# Patient Record
Sex: MALE | Race: WHITE | Employment: OTHER | URBAN - METROPOLITAN AREA
[De-identification: names, ages, dates, MRNs, and addresses within clinical notes are randomized per-mention and may not be internally consistent; named-entity substitution may affect disease eponyms.]

---

## 2017-02-13 ENCOUNTER — OFFICE VISIT (OUTPATIENT)
Dept: AUDIOLOGY | Facility: CLINIC | Age: 57
End: 2017-02-13
Payer: COMMERCIAL

## 2017-02-13 ENCOUNTER — OFFICE VISIT (OUTPATIENT)
Dept: OTOLARYNGOLOGY | Facility: CLINIC | Age: 57
End: 2017-02-13
Payer: COMMERCIAL

## 2017-02-13 VITALS — TEMPERATURE: 98.4 F | SYSTOLIC BLOOD PRESSURE: 116 MMHG | WEIGHT: 175 LBS | DIASTOLIC BLOOD PRESSURE: 74 MMHG

## 2017-02-13 DIAGNOSIS — H90.3 ASYMMETRICAL SENSORINEURAL HEARING LOSS: Primary | ICD-10-CM

## 2017-02-13 DIAGNOSIS — H93.13 TINNITUS, BILATERAL: ICD-10-CM

## 2017-02-13 DIAGNOSIS — H90.3 SENSORINEURAL HEARING LOSS, ASYMMETRICAL: Primary | ICD-10-CM

## 2017-02-13 PROCEDURE — 92550 TYMPANOMETRY & REFLEX THRESH: CPT | Performed by: AUDIOLOGIST

## 2017-02-13 PROCEDURE — 92557 COMPREHENSIVE HEARING TEST: CPT | Performed by: AUDIOLOGIST

## 2017-02-13 PROCEDURE — 92593 HC HEARING AID CHECK, BINAURAL: CPT | Performed by: AUDIOLOGIST

## 2017-02-13 PROCEDURE — 99207 ZZC NO CHARGE LOS: CPT | Performed by: AUDIOLOGIST

## 2017-02-13 PROCEDURE — 99203 OFFICE O/P NEW LOW 30 MIN: CPT | Performed by: OTOLARYNGOLOGY

## 2017-02-13 PROCEDURE — V5266 BATTERY FOR HEARING DEVICE: HCPCS | Performed by: AUDIOLOGIST

## 2017-02-13 ASSESSMENT — PAIN SCALES - GENERAL: PAINLEVEL: NO PAIN (0)
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
## 2017-02-13 NOTE — NURSING NOTE
Initial /74 (BP Location: Left arm, Patient Position: Chair, Cuff Size: Adult Regular)  Temp 98.4  F (36.9  C) (Oral)  Wt 79.4 kg (175 lb) There is no height or weight on file to calculate BMI. .    Dina Aguirre LPN

## 2017-02-13 NOTE — MR AVS SNAPSHOT
"              After Visit Summary   2/13/2017    Abhijeet Mccauley    MRN: [id number]           Patient Information     Date Of Birth          1960        Visit Information        Provider Department      2/13/2017 1:30 PM Ai Min AuD [facility]        Today's Diagnoses     Sensorineural hearing loss, asymmetrical    -  1       Follow-ups after your visit        Your next 10 appointments already scheduled     Mar 20, 2017  1:00 PM CDT   [facility] with Sheila Romano   [facility]    [address] [address]   [phone]              Who to contact     If you have questions or need follow up information about today's clinic visit or your schedule please contact [facility] directly at [phone].  Normal or non-critical lab and imaging results will be communicated to you by [email or web address], letter or phone within 4 business days after the clinic has received the results. If you do not hear from us within 7 days, please contact the clinic through MyChart or phone. If you have a critical or abnormal lab result, we will notify you by phone as soon as possible.  Submit refill requests through [employer] or call your pharmacy and they will forward the refill request to us. Please allow 3 business days for your refill to be completed.          Additional Information About Your Visit        MyChart Information     [employer] lets you send messages to your doctor, view your test results, renew your prescriptions, schedule appointments and more. To sign up, go to [email or web address] . Click on \"Log in\" on the left side of the screen, which will take you to the Welcome page. Then click on \"Sign up Now\" on the right side of the page.     You will be asked to enter the access code listed below, as well as some personal information. Please follow the directions to create your username and password.     Your access code is: " [email or web address]  Expires: 3/21/2017  2:50 PM     Your access code will  in 90 days. If you need help or a new code, please call your [place] clinic or [phone].        Care EveryWhere ID     This is your Care EveryWhere ID. This could be used by other organizations to access your [place] medical records  [id number]         Blood Pressure from Last 3 Encounters:   17 116/74   16 117/53   12/10/14 145/88    Weight from Last 3 Encounters:   17 175 lb (79.4 kg)   12 183 lb (83 kg)              We Performed the Following     HEARING AID CHECK, BINAURAL     HEARING DEVICE BATTERY        Primary Care Provider    [facility]                Thank you!     Thank you for choosing [facility]  for your care. Our goal is always to provide you with excellent care. Hearing back from our patients is one way we can continue to improve our services. Please take a few minutes to complete the written survey that you may receive in the mail after your visit with us. Thank you!             Your Updated Medication List - Protect others around you: Learn how to safely use, store and throw away your medicines at www.disposemymeds.org.          This list is accurate as of: 17  4:45 PM.  Always use your most recent med list.                   Brand Name Dispense Instructions for use    acetaminophen 500 MG tablet    TYLENOL     Take 1-2 tablets by mouth every 6 hours as needed.       albuterol 90 MCG/ACT inhaler     1 Inhaler    Inhale 2 puffs into the lungs every 4 hours as needed for shortness of breath / dyspnea.       AMITRIPTYLINE HCL PO      Take 25 mg by mouth       amLODIPine 5 MG tablet    NORVASC    90 tablet    Take 1 tablet by mouth daily.       aspirin 325 MG tablet     100 tablet    Take 1 tablet by mouth daily. Further refills from primary care physician       atenolol 50 MG tablet    TENORMIN    90 tablet    Take 1 tablet by mouth daily.       FIRST-MOUTHWASH BLM Susp      237 mL    Swish and swallow 5-10 mLs in mouth every 6 hours as needed       FLEXERIL PO          hydrocortisone 1 % cream    CORTAID    30 g    Apply topically 2 times daily Apply sparingly to face rash twice daily.       LASIX PO      Take 20 mg by mouth       loratadine 10 MG tablet    CLARITIN    30 tablet    Take 1 tablet by mouth daily as needed for allergies.       naproxen sodium 220 MG tablet    ANAPROX     Take 220 mg by mouth 2 times daily (with meals).       PANTOPRAZOLE SODIUM PO      Take 40 mg by mouth       potassium chloride 20 MEQ Packet    KLOR-CON     Take 10 mEq by mouth 2 times daily       PROPRANOLOL HCL PO      Take 20 mg by mouth       SODIUM BICARBONATE PO      Take 650 mg by mouth       traMADol 50 MG tablet    ULTRAM     Take 50 mg by mouth every 6 hours as needed.       TRAZODONE HCL PO      Take 50 mg by mouth       valACYclovir 1000 mg tablet    VALTREX    21 tablet    Take 1 tablet (1,000 mg) by mouth 3 times daily       zolpidem 5 MG tablet    AMBIEN     Take 5 mg by mouth nightly as needed.

## 2017-02-13 NOTE — MR AVS SNAPSHOT
"              After Visit Summary   2/13/2017    Abhijeet Mccauley    MRN: [id number]           Patient Information     Date Of Birth          1960        Visit Information        Provider Department      2/13/2017 12:30 PM Kaylen Rodríguez [facility]D [facility]        Today's Diagnoses     Sensorineural hearing loss, asymmetrical    -  1    Tinnitus, bilateral           Follow-ups after your visit        Your next 10 appointments already scheduled     Feb 13, 2017  1:30 PM CST   New Visit with Sheila Romano   [facility]    [address] [address]   [phone]              Who to contact     If you have questions or need follow up information about today's clinic visit or your schedule please contact [facility] directly at [phone].  Normal or non-critical lab and imaging results will be communicated to you by MyChart, letter or phone within 4 business days after the clinic has received the results. If you do not hear from us within 7 days, please contact the clinic through MyChart or phone. If you have a critical or abnormal lab result, we will notify you by phone as soon as possible.  Submit refill requests through [email or web address] or call your pharmacy and they will forward the refill request to us. Please allow 3 business days for your refill to be completed.          Additional Information About Your Visit        MyChart Information     [email or web address] lets you send messages to your doctor, view your test results, renew your prescriptions, schedule appointments and more. To sign up, go to [email or web address] . Click on \"Log in\" on the left side of the screen, which will take you to the Welcome page. Then click on \"Sign up Now\" on the right side of the page.     You will be asked to enter the access code listed below, as well as some personal information. Please follow the directions to create your username and " password.     Your access code is: [email or web address]  Expires: 3/21/2017  2:50 PM     Your access code will  in 90 days. If you need help or a new code, please call your [facility] or [phone].        Care EveryWhere ID     This is your Care EveryWhere ID. This could be used by other organizations to access your [facility] medical records  [id number]         Blood Pressure from Last 3 Encounters:   16 117/53   12/10/14 145/88   12 155/83    Weight from Last 3 Encounters:   12 183 lb (83 kg)              We Performed the Following     AUDIOGRAM/TYMPANOGRAM - INTERFACE     COMPREHENSIVE HEARING TEST     TYMPANOMETRY AND REFLEX THRESHOLD MEASUREMENTS        Primary Care Provider    [facility]                Thank you!     Thank you for choosing [facility]  for your care. Our goal is always to provide you with excellent care. Hearing back from our patients is one way we can continue to improve our services. Please take a few minutes to complete the written survey that you may receive in the mail after your visit with us. Thank you!             Your Updated Medication List - Protect others around you: Learn how to safely use, store and throw away your medicines at www.disposemymeds.org.          This list is accurate as of: 17  1:22 PM.  Always use your most recent med list.                   Brand Name Dispense Instructions for use    acetaminophen 500 MG tablet    TYLENOL     Take 1-2 tablets by mouth every 6 hours as needed.       albuterol 108 (90 BASE) MCG/ACT Inhaler   Generic drug:  albuterol      Inhale  into the lungs.       albuterol 90 MCG/ACT inhaler     1 Inhaler    Inhale 2 puffs into the lungs every 4 hours as needed for shortness of breath / dyspnea.       AMITRIPTYLINE HCL PO      Take 25 mg by mouth       amLODIPine 5 MG tablet    NORVASC    90 tablet    Take 1 tablet by mouth daily.       aspirin 325 MG tablet     100 tablet    Take 1 tablet by mouth  daily. Further refills from primary care physician       atenolol 50 MG tablet    TENORMIN    90 tablet    Take 1 tablet by mouth daily.       FIRST-MOUTHWASH BLM Susp     237 mL    Swish and swallow 5-10 mLs in mouth every 6 hours as needed       FLEXERIL PO          hydrocortisone 1 % cream    CORTAID    30 g    Apply topically 2 times daily Apply sparingly to face rash twice daily.       LASIX PO      Take 20 mg by mouth       loratadine 10 MG tablet    CLARITIN    30 tablet    Take 1 tablet by mouth daily as needed for allergies.       naproxen sodium 220 MG tablet    ANAPROX     Take 220 mg by mouth 2 times daily (with meals).       PANTOPRAZOLE SODIUM PO      Take 40 mg by mouth       potassium chloride 20 MEQ Packet    KLOR-CON     Take 10 mEq by mouth 2 times daily       PROPRANOLOL HCL PO      Take 20 mg by mouth       SODIUM BICARBONATE PO      Take 650 mg by mouth       traMADol 50 MG tablet    ULTRAM     Take 50 mg by mouth every 6 hours as needed.       TRAZODONE HCL PO      Take 50 mg by mouth       valACYclovir 1000 mg tablet    VALTREX    21 tablet    Take 1 tablet (1,000 mg) by mouth 3 times daily       zolpidem 5 MG tablet    AMBIEN     Take 5 mg by mouth nightly as needed.

## 2017-02-13 NOTE — MR AVS SNAPSHOT
After Visit Summary   2/13/2017    Abhijeet Mccauley    MRN: [id number]           Patient Information     Date Of Birth          1960        Visit Information        Provider Department      2/13/2017 1:15 PM Lennox Polanco MD [facility]        Today's Diagnoses     Asymmetrical sensorineural hearing loss    -  1      Care Instructions    Per physician's instructions          Follow-ups after your visit        Additional Services     AUDIOLOGY ADULT REFERRAL       Your provider has referred you to: [facility]: [facility] [phone]   [email or web address]    Treatment:  Evaluation & Treatment  Specialty Testing:  Audiogram w/Tymps and Reflexes    Please be aware that coverage of these services is subject to the terms and limitations of your health insurance plan.  Call member services at your health plan with any benefit or coverage questions.      Please bring the following to your appointment:  >>   Any x-rays, CTs or MRIs which have been performed.  Contact the facility where they were done to arrange for  prior to your scheduled appointment.  Any new CT, MRI or other procedures ordered by your specialist must be performed at a [place] facility or coordinated by your clinic's referral office.   >>   List of current medications  >>   This referral request   >>   Any documents/labs given to you for this referral                  Who to contact     If you have questions or need follow up information about today's clinic visit or your schedule please contact [facility] directly at [phone].  Normal or non-critical lab and imaging results will be communicated to you by MyChart, letter or phone within 4 business days after the clinic has received the results. If you do not hear from us within 7 days, please contact the clinic through MyChart or phone. If you have a critical or abnormal lab result, we will notify you by  "phone as soon as possible.  Submit refill requests through MegaHoot or call your pharmacy and they will forward the refill request to us. Please allow 3 business days for your refill to be completed.          Additional Information About Your Visit        MegaHoot Information     MegaHoot lets you send messages to your doctor, view your test results, renew your prescriptions, schedule appointments and more. To sign up, go to [email or web address] . Click on \"Log in\" on the left side of the screen, which will take you to the Welcome page. Then click on \"Sign up Now\" on the right side of the page.     You will be asked to enter the access code listed below, as well as some personal information. Please follow the directions to create your username and password.     Your access code is: [email or web address]  Expires: 3/21/2017  2:50 PM     Your access code will  in 90 days. If you need help or a new code, please call your [place] clinic or [phone].        Care EveryWhere ID     This is your Care EveryWhere ID. This could be used by other organizations to access your [place] medical records  [id number]        Your Vitals Were     Temperature                   98.4  F (36.9  C) (Oral)            Blood Pressure from Last 3 Encounters:   17 116/74   16 117/53   12/10/14 145/88    Weight from Last 3 Encounters:   17 79.4 kg (175 lb)   12 83 kg (183 lb)              We Performed the Following     AUDIOLOGY ADULT REFERRAL        Primary Care Provider    Md Mehreen Patton                Thank you!     Thank you for choosing [facility]  for your care. Our goal is always to provide you with excellent care. Hearing back from our patients is one way we can continue to improve our services. Please take a few minutes to complete the written survey that you may receive in the mail after your visit with us. Thank you!             Your Updated Medication List - Protect others around you: Learn " how to safely use, store and throw away your medicines at www.disposemymeds.org.          This list is accurate as of: 2/13/17  2:12 PM.  Always use your most recent med list.                   Brand Name Dispense Instructions for use    acetaminophen 500 MG tablet    TYLENOL     Take 1-2 tablets by mouth every 6 hours as needed.       albuterol 90 MCG/ACT inhaler     1 Inhaler    Inhale 2 puffs into the lungs every 4 hours as needed for shortness of breath / dyspnea.       AMITRIPTYLINE HCL PO      Take 25 mg by mouth       amLODIPine 5 MG tablet    NORVASC    90 tablet    Take 1 tablet by mouth daily.       aspirin 325 MG tablet     100 tablet    Take 1 tablet by mouth daily. Further refills from primary care physician       atenolol 50 MG tablet    TENORMIN    90 tablet    Take 1 tablet by mouth daily.       FIRST-MOUTHWASH BLM Susp     237 mL    Swish and swallow 5-10 mLs in mouth every 6 hours as needed       FLEXERIL PO          hydrocortisone 1 % cream    CORTAID    30 g    Apply topically 2 times daily Apply sparingly to face rash twice daily.       LASIX PO      Take 20 mg by mouth       loratadine 10 MG tablet    CLARITIN    30 tablet    Take 1 tablet by mouth daily as needed for allergies.       naproxen sodium 220 MG tablet    ANAPROX     Take 220 mg by mouth 2 times daily (with meals).       PANTOPRAZOLE SODIUM PO      Take 40 mg by mouth       potassium chloride 20 MEQ Packet    KLOR-CON     Take 10 mEq by mouth 2 times daily       PROPRANOLOL HCL PO      Take 20 mg by mouth       SODIUM BICARBONATE PO      Take 650 mg by mouth       traMADol 50 MG tablet    ULTRAM     Take 50 mg by mouth every 6 hours as needed.       TRAZODONE HCL PO      Take 50 mg by mouth       valACYclovir 1000 mg tablet    VALTREX    21 tablet    Take 1 tablet (1,000 mg) by mouth 3 times daily       zolpidem 5 MG tablet    AMBIEN     Take 5 mg by mouth nightly as needed.

## 2017-02-13 NOTE — PROGRESS NOTES
56 year old male comes in for a hearing aid recheck. He is currently wearing 2014 Phonak Centrafus[employer]o [id number] hearing aids that were purchased and fit at another facility. He reports he is not hearing well with the hearing aids. He states his hearing aid batteries are only lasting a few days.    Reviewed hearing aid care and use including using the hearing aid batteries.Verified hearing aid functionality.      Reprogrammed hearing aid and verified response using NAL-NL 2 targets. Hearing aids were previously set to 70% of target and were severely under amplifying. See chart in the hearing aid room.     Patient reports the hearing aids sound much better. Discussed acclimating to the new sounds.    Return to clinic in 4 weeks for a hearing aid recheck.    Ai SELF-HEATH, #[id number]

## 2017-02-22 ENCOUNTER — TELEPHONE (OUTPATIENT)
Dept: AUDIOLOGY | Facility: CLINIC | Age: 57
End: 2017-02-22

## 2017-02-22 NOTE — TELEPHONE ENCOUNTER
Reason for Call:  Other call back    Detailed comments: [facility] [employer] [facility] [facility] will require a PA for replacement if it is normal wear and tear on the hearing aid.      Phone Number Patient can be reached at: Other phone number:  [phone]*    Best Time: any     Can we leave a detailed message on this number? YES    Call taken on 2/22/2017 at 12:52 PM by Elena Jin

## 2017-02-23 ENCOUNTER — OFFICE VISIT (OUTPATIENT)
Dept: AUDIOLOGY | Facility: CLINIC | Age: 57
End: 2017-02-23
Payer: COMMERCIAL

## 2017-02-23 DIAGNOSIS — H90.3 SENSORINEURAL HEARING LOSS, ASYMMETRICAL: Primary | ICD-10-CM

## 2017-02-23 PROCEDURE — 92593 HC HEARING AID CHECK, BINAURAL: CPT | Performed by: AUDIOLOGIST

## 2017-02-23 PROCEDURE — 99207 ZZC NO CHARGE LOS: CPT | Performed by: AUDIOLOGIST

## 2017-02-23 NOTE — MR AVS SNAPSHOT
"              After Visit Summary   2/23/2017    Abhijeet Mccauley    MRN: [id number]           Patient Information     Date Of Birth          1960        Visit Information        Provider Department      2/23/2017 1:30 PM Ai Min AuD [facility]        Today's Diagnoses     Sensorineural hearing loss, asymmetrical    -  1       Follow-ups after your visit        Your next 10 appointments already scheduled     Mar 20, 2017  1:00 PM CDT   [facility]    [address] [address]   [phone]              Who to contact     If you have questions or need follow up information about today's clinic visit or your schedule please contact [facility] directly at [phone].  Normal or non-critical lab and imaging results will be communicated to you by Annovation BioPharmahart, letter or phone within 4 business days after the clinic has received the results. If you do not hear from us within 7 days, please contact the clinic through MyChart or phone. If you have a critical or abnormal lab result, we will notify you by phone as soon as possible.  Submit refill requests through Controlus or call your pharmacy and they will forward the refill request to us. Please allow 3 business days for your refill to be completed.          Additional Information About Your Visit        MyChart Information     Controlus lets you send messages to your doctor, view your test results, renew your prescriptions, schedule appointments and more. To sign up, go to [email or web address] . Click on \"Log in\" on the left side of the screen, which will take you to the Welcome page. Then click on \"Sign up Now\" on the right side of the page.     You will be asked to enter the access code listed below, as well as some personal information. Please follow the directions to create your username and password.     Your access code is: " [email or web address]  Expires: 3/21/2017  2:50 PM     Your access code will  in 90 days. If you need help or a new code, please call your [place] clinic or [phone].        Care EveryWhere ID     This is your Care EveryWhere ID. This could be used by other organizations to access your [place] medical records  [id number]         Blood Pressure from Last 3 Encounters:   17 116/74   16 117/53   12/10/14 145/88    Weight from Last 3 Encounters:   17 175 lb (79.4 kg)   12 183 lb (83 kg)              We Performed the Following     HEARING AID CHECK, BINAURAL        Primary Care Provider    [facility] Other Clinic                Thank you!     Thank you for choosing [facility]  for your care. Our goal is always to provide you with excellent care. Hearing back from our patients is one way we can continue to improve our services. Please take a few minutes to complete the written survey that you may receive in the mail after your visit with us. Thank you!             Your Updated Medication List - Protect others around you: Learn how to safely use, store and throw away your medicines at www.disposemymeds.org.          This list is accurate as of: 17  1:43 PM.  Always use your most recent med list.                   Brand Name Dispense Instructions for use    acetaminophen 500 MG tablet    TYLENOL     Take 1-2 tablets by mouth every 6 hours as needed.       albuterol 90 MCG/ACT inhaler     1 Inhaler    Inhale 2 puffs into the lungs every 4 hours as needed for shortness of breath / dyspnea.       AMITRIPTYLINE HCL PO      Take 25 mg by mouth       amLODIPine 5 MG tablet    NORVASC    90 tablet    Take 1 tablet by mouth daily.       aspirin 325 MG tablet     100 tablet    Take 1 tablet by mouth daily. Further refills from primary care physician       atenolol 50 MG tablet    TENORMIN    90 tablet    Take 1 tablet by mouth daily.       FIRST-MOUTHWASH BLM Susp     237 mL    Swish and  swallow 5-10 mLs in mouth every 6 hours as needed       FLEXERIL PO          hydrocortisone 1 % cream    CORTAID    30 g    Apply topically 2 times daily Apply sparingly to face rash twice daily.       LASIX PO      Take 20 mg by mouth       loratadine 10 MG tablet    CLARITIN    30 tablet    Take 1 tablet by mouth daily as needed for allergies.       naproxen sodium 220 MG tablet    ANAPROX     Take 220 mg by mouth 2 times daily (with meals).       PANTOPRAZOLE SODIUM PO      Take 40 mg by mouth       potassium chloride 20 MEQ Packet    KLOR-CON     Take 10 mEq by mouth 2 times daily       PROPRANOLOL HCL PO      Take 20 mg by mouth       SODIUM BICARBONATE PO      Take 650 mg by mouth       traMADol 50 MG tablet    ULTRAM     Take 50 mg by mouth every 6 hours as needed.       TRAZODONE HCL PO      Take 50 mg by mouth       valACYclovir 1000 mg tablet    VALTREX    21 tablet    Take 1 tablet (1,000 mg) by mouth 3 times daily       zolpidem 5 MG tablet    AMBIEN     Take 5 mg by mouth nightly as needed.

## 2017-02-23 NOTE — PROGRESS NOTES
56 year old male comes in with his 2014 Phonak Audeo  hearing aids. His hearing aids were ordered and fit at another facility.    Patient reports the hearing aids were working very well after they were reprogrammed 2 weeks ago but then the left one stopped working.     Examination reveals the wax guard is plugged. Demonstrated how to replaced the wax guards and clean the domes. Verified hearing aid functionality.  See chart in the hearing aid room.     Return to clinic as needed.    Ai KUNZ, #[id number]

## 2017-03-28 ENCOUNTER — OFFICE VISIT (OUTPATIENT)
Dept: AUDIOLOGY | Facility: CLINIC | Age: 57
End: 2017-03-28
Payer: COMMERCIAL

## 2017-03-28 DIAGNOSIS — H90.3 SENSORINEURAL HEARING LOSS, ASYMMETRICAL: Primary | ICD-10-CM

## 2017-03-28 PROCEDURE — 92592 HC HEARING AID CHECK, MONAURAL: CPT | Performed by: AUDIOLOGIST

## 2017-03-28 PROCEDURE — V5267 HEARING AID SUP/ACCESS/DEV: HCPCS | Performed by: AUDIOLOGIST

## 2017-03-28 NOTE — MR AVS SNAPSHOT
"              After Visit Summary   3/28/2017    Abhijeet Mccauley    MRN: [id number]           Patient Information     Date Of Birth          1960        Visit Information        Provider Department      3/28/2017 2:00 PM Ai Min AuD [facility]        Today's Diagnoses     Sensorineural hearing loss, asymmetrical    -  1       Follow-ups after your visit        Who to contact     If you have questions or need follow up information about today's clinic visit or your schedule please contact [facility] directly at [phone].  Normal or non-critical lab and imaging results will be communicated to you by Connollyhart, letter or phone within 4 business days after the clinic has received the results. If you do not hear from us within 7 days, please contact the clinic through Clarus Systemst or phone. If you have a critical or abnormal lab result, we will notify you by phone as soon as possible.  Submit refill requests through mValent or call your pharmacy and they will forward the refill request to us. Please allow 3 business days for your refill to be completed.          Additional Information About Your Visit        MyChart Information     mValent lets you send messages to your doctor, view your test results, renew your prescriptions, schedule appointments and more. To sign up, go to [email or web address] . Click on \"Log in\" on the left side of the screen, which will take you to the Welcome page. Then click on \"Sign up Now\" on the right side of the page.     You will be asked to enter the access code listed below, as well as some personal information. Please follow the directions to create your username and password.     Your access code is: [email or web address]  Expires: 2017  2:21 PM     Your access code will  in 90 days. If you need help or a new code, please call your [facility] or [phone].        Care EveryWhere ID     This is your Care EveryWhere ID. This could be used " by other organizations to access your [place] medical records  [id number]         Blood Pressure from Last 3 Encounters:   02/13/17 116/74   12/21/16 117/53   12/10/14 145/88    Weight from Last 3 Encounters:   02/13/17 175 lb (79.4 kg)   01/26/12 183 lb (83 kg)              We Performed the Following     HEARING AID CHECK, MONAURAL     HEARING AID SUPPLY        Primary Care Provider    [facility] Other Clinic                Thank you!     Thank you for choosing [facility]  for your care. Our goal is always to provide you with excellent care. Hearing back from our patients is one way we can continue to improve our services. Please take a few minutes to complete the written survey that you may receive in the mail after your visit with us. Thank you!             Your Updated Medication List - Protect others around you: Learn how to safely use, store and throw away your medicines at www.disposemymeds.org.          This list is accurate as of: 3/28/17  2:21 PM.  Always use your most recent med list.                   Brand Name Dispense Instructions for use    acetaminophen 500 MG tablet    TYLENOL     Take 1-2 tablets by mouth every 6 hours as needed.       albuterol 90 MCG/ACT inhaler     1 Inhaler    Inhale 2 puffs into the lungs every 4 hours as needed for shortness of breath / dyspnea.       AMITRIPTYLINE HCL PO      Take 25 mg by mouth       amLODIPine 5 MG tablet    NORVASC    90 tablet    Take 1 tablet by mouth daily.       aspirin 325 MG tablet     100 tablet    Take 1 tablet by mouth daily. Further refills from primary care physician       atenolol 50 MG tablet    TENORMIN    90 tablet    Take 1 tablet by mouth daily.       FIRST-MOUTHWASH BLM Susp     237 mL    Swish and swallow 5-10 mLs in mouth every 6 hours as needed       FLEXERIL PO          hydrocortisone 1 % cream    CORTAID    30 g    Apply topically 2 times daily Apply sparingly to face rash twice daily.       LASIX PO      Take 20 mg by  mouth       loratadine 10 MG tablet    CLARITIN    30 tablet    Take 1 tablet by mouth daily as needed for allergies.       naproxen sodium 220 MG tablet    ANAPROX     Take 220 mg by mouth 2 times daily (with meals).       PANTOPRAZOLE SODIUM PO      Take 40 mg by mouth       potassium chloride 20 MEQ Packet    KLOR-CON     Take 10 mEq by mouth 2 times daily       PROPRANOLOL HCL PO      Take 20 mg by mouth       SODIUM BICARBONATE PO      Take 650 mg by mouth       traMADol 50 MG tablet    ULTRAM     Take 50 mg by mouth every 6 hours as needed.       TRAZODONE HCL PO      Take 50 mg by mouth       valACYclovir 1000 mg tablet    VALTREX    21 tablet    Take 1 tablet (1,000 mg) by mouth 3 times daily       zolpidem 5 MG tablet    AMBIEN     Take 5 mg by mouth nightly as needed.

## 2017-03-28 NOTE — PROGRESS NOTES
56 year old male comes in with his 2014 Phonak Audeo [id number] hearing aid reporting it is not working. He states he changed the battery and it still is not coming on.    Replaced the #3 xS  and large open dome. Verified hearing aid functionality.  See chart in the hearing aid room.     Patient is pleased the hearing aid is working again. Gave patient extra large open domes per his request.     Return to clinic as needed.    Ai SELF-HEATH, #[id number]

## 2017-04-12 ENCOUNTER — TELEPHONE (OUTPATIENT)
Dept: AUDIOLOGY | Facility: CLINIC | Age: 57
End: 2017-04-12

## 2017-04-12 NOTE — TELEPHONE ENCOUNTER
Reason for Call: patient is calling in states that he continues to have problems with his hearing aids    Detailed comments: please advise above    Phone Number Patient can be reached at: Home number on file [phone] (home)    Best Time: any    Can we leave a detailed message on this number? YES    Call taken on 4/12/2017 at 2:23 PM by Anna Zaragoza

## 2017-04-19 ENCOUNTER — OFFICE VISIT (OUTPATIENT)
Dept: AUDIOLOGY | Facility: CLINIC | Age: 57
End: 2017-04-19
Payer: COMMERCIAL

## 2017-04-19 DIAGNOSIS — H90.3 SENSORINEURAL HEARING LOSS, ASYMMETRICAL: Primary | ICD-10-CM

## 2017-04-19 PROCEDURE — 92592 HC HEARING AID CHECK, MONAURAL: CPT | Performed by: AUDIOLOGIST

## 2017-04-19 NOTE — PROGRESS NOTES
56 year old male comes in with his 2014 Phonak Audeo [id number] hearing aids. He reports again that they are not working.    Examination again reveals they are dirty. Reviewed and demonstrated how to change the wax guards, clean the domes and the hearing aids. Cleaned hearing aid cases for patient. Gave patient extra wax guards and cleaning tool. Verified hearing aid functionality.   See chart in the hearing aid room.     Discussed importance of keeping his hearing aids and cases clean.    Return to clinic as needed.     Ai SELF-HEATH, #[id number]

## 2017-04-19 NOTE — MR AVS SNAPSHOT
"              After Visit Summary   2017    Abhijeet Mccauley    MRN: [id number]           Patient Information     Date Of Birth          1960        Visit Information        Provider Department      2017 2:30 PM Ai Min AuD [facility]        Today's Diagnoses     Sensorineural hearing loss, asymmetrical    -  1       Follow-ups after your visit        Who to contact     If you have questions or need follow up information about today's clinic visit or your schedule please contact [facility] directly at [phone].  Normal or non-critical lab and imaging results will be communicated to you by Drikhart, letter or phone within 4 business days after the clinic has received the results. If you do not hear from us within 7 days, please contact the clinic through Hiptypet or phone. If you have a critical or abnormal lab result, we will notify you by phone as soon as possible.  Submit refill requests through Diffbot or call your pharmacy and they will forward the refill request to us. Please allow 3 business days for your refill to be completed.          Additional Information About Your Visit        MyChart Information     Diffbot lets you send messages to your doctor, view your test results, renew your prescriptions, schedule appointments and more. To sign up, go to [email or web address] . Click on \"Log in\" on the left side of the screen, which will take you to the Welcome page. Then click on \"Sign up Now\" on the right side of the page.     You will be asked to enter the access code listed below, as well as some personal information. Please follow the directions to create your username and password.     Your access code is: [email or web address]  Expires: 2017  2:21 PM     Your access code will  in 90 days. If you need help or a new code, please call your [facility] or [phone].        Care EveryWhere ID     This is your Care EveryWhere ID. This could be used " by other organizations to access your [place] medical records  [id number]         Blood Pressure from Last 3 Encounters:   02/13/17 116/74   12/21/16 117/53   12/10/14 145/88    Weight from Last 3 Encounters:   02/13/17 175 lb (79.4 kg)   01/26/12 183 lb (83 kg)              We Performed the Following     HEARING AID CHECK, MONAURAL        Primary Care Provider    [facility] Other Clinic                Thank you!     Thank you for choosing [facility]  for your care. Our goal is always to provide you with excellent care. Hearing back from our patients is one way we can continue to improve our services. Please take a few minutes to complete the written survey that you may receive in the mail after your visit with us. Thank you!             Your Updated Medication List - Protect others around you: Learn how to safely use, store and throw away your medicines at www.disposemymeds.org.          This list is accurate as of: 4/19/17  2:40 PM.  Always use your most recent med list.                   Brand Name Dispense Instructions for use    acetaminophen 500 MG tablet    TYLENOL     Take 1-2 tablets by mouth every 6 hours as needed.       albuterol 90 MCG/ACT inhaler     1 Inhaler    Inhale 2 puffs into the lungs every 4 hours as needed for shortness of breath / dyspnea.       AMITRIPTYLINE HCL PO      Take 25 mg by mouth       amLODIPine 5 MG tablet    NORVASC    90 tablet    Take 1 tablet by mouth daily.       aspirin 325 MG tablet     100 tablet    Take 1 tablet by mouth daily. Further refills from primary care physician       atenolol 50 MG tablet    TENORMIN    90 tablet    Take 1 tablet by mouth daily.       FLEXERIL PO          hydrocortisone 1 % cream    CORTAID    30 g    Apply topically 2 times daily Apply sparingly to face rash twice daily.       LASIX PO      Take 20 mg by mouth       lidocaine visc 2% & diphenhydramine 12.5mg/5mL & maalox/mylanta w/simethicone (1:1:1 v/v/v) Susp compounding kit      237 mL    Swish and swallow 5-10 mLs in mouth every 6 hours as needed       loratadine 10 MG tablet    CLARITIN    30 tablet    Take 1 tablet by mouth daily as needed for allergies.       naproxen sodium 220 MG tablet    ANAPROX     Take 220 mg by mouth 2 times daily (with meals).       PANTOPRAZOLE SODIUM PO      Take 40 mg by mouth       potassium chloride 20 MEQ Packet    KLOR-CON     Take 10 mEq by mouth 2 times daily       PROPRANOLOL HCL PO      Take 20 mg by mouth       SODIUM BICARBONATE PO      Take 650 mg by mouth       traMADol 50 MG tablet    ULTRAM     Take 50 mg by mouth every 6 hours as needed.       TRAZODONE HCL PO      Take 50 mg by mouth       valACYclovir 1000 mg tablet    VALTREX    21 tablet    Take 1 tablet (1,000 mg) by mouth 3 times daily       zolpidem 5 MG tablet    AMBIEN     Take 5 mg by mouth nightly as needed.

## 2017-05-25 ENCOUNTER — OFFICE VISIT (OUTPATIENT)
Dept: AUDIOLOGY | Facility: CLINIC | Age: 57
End: 2017-05-25
Payer: COMMERCIAL

## 2017-05-25 DIAGNOSIS — H90.3 SENSORINEURAL HEARING LOSS, ASYMMETRICAL: Primary | ICD-10-CM

## 2017-05-25 PROCEDURE — V5266 BATTERY FOR HEARING DEVICE: HCPCS | Performed by: AUDIOLOGIST

## 2017-05-25 NOTE — PROGRESS NOTES
56 year old male requests hearing aid batteries. Currently wearing 2014 [employer]  hearing aids that were fit at another clinic.     Verified date of last battery dispensing and size needed.     Billed insurance for 30 size 312 hearing aid batteries. See chart in the hearing aid room.     Return to clinic as needed.    Ai KUNZ, #[id number]

## 2017-05-25 NOTE — MR AVS SNAPSHOT
"              After Visit Summary   5/25/2017    Abhijeet Mccauley    MRN: [id number]           Patient Information     Date Of Birth          1960        Visit Information        Provider Department      5/25/2017 3:00 PM [facility]        Today's Diagnoses     Sensorineural hearing loss, asymmetrical    -  1       Follow-ups after your visit        Your next 10 appointments already scheduled     May 25, 2017  3:00 PM CDT   [facility] with Sheila [facility]   [facility]    [address] [address]   [phone]              Who to contact     If you have questions or need follow up information about today's clinic visit or your schedule please contact [facility] directly at [phone].  Normal or non-critical lab and imaging results will be communicated to you by Nimble TVhart, letter or phone within 4 business days after the clinic has received the results. If you do not hear from us within 7 days, please contact the clinic through MyChart or phone. If you have a critical or abnormal lab result, we will notify you by phone as soon as possible.  Submit refill requests through LIQVID or call your pharmacy and they will forward the refill request to us. Please allow 3 business days for your refill to be completed.          Additional Information About Your Visit        MyChart Information     LI[employer] lets you send messages to your doctor, view your test results, renew your prescriptions, schedule appointments and more. To sign up, go to [email or web address] . Click on \"Log in\" on the left side of the screen, which will take you to the Welcome page. Then click on \"Sign up Now\" on the right side of the page.     You will be asked to enter the access code listed below, as well as some personal information. Please follow the directions to create your username and password.     Your access code is: " [email or web address]  Expires: 2017  2:21 PM     Your access code will  in 90 days. If you need help or a new code, please call your [facility] or [phone].        Care EveryWhere ID     This is your Care EveryWhere ID. This could be used by other organizations to access your [facility] medical records  [id number]         Blood Pressure from Last 3 Encounters:   17 116/74   16 117/53   12/10/14 145/88    Weight from Last 3 Encounters:   17 175 lb (79.4 kg)   12 183 lb (83 kg)              We Performed the Following     HEARING DEVICE BATTERY        Primary Care Provider    [facility] Other Clinic                Thank you!     Thank you for choosing [facility]  for your care. Our goal is always to provide you with excellent care. Hearing back from our patients is one way we can continue to improve our services. Please take a few minutes to complete the written survey that you may receive in the mail after your visit with us. Thank you!             Your Updated Medication List - Protect others around you: Learn how to safely use, store and throw away your medicines at www.disposemymeds.org.          This list is accurate as of: 17  2:43 PM.  Always use your most recent med list.                   Brand Name Dispense Instructions for use    acetaminophen 500 MG tablet    TYLENOL     Take 1-2 tablets by mouth every 6 hours as needed.       albuterol 90 MCG/ACT inhaler     1 Inhaler    Inhale 2 puffs into the lungs every 4 hours as needed for shortness of breath / dyspnea.       AMITRIPTYLINE HCL PO      Take 25 mg by mouth       amLODIPine 5 MG tablet    NORVASC    90 tablet    Take 1 tablet by mouth daily.       aspirin 325 MG tablet     100 tablet    Take 1 tablet by mouth daily. Further refills from primary care physician       atenolol 50 MG tablet    TENORMIN    90 tablet    Take 1 tablet by mouth daily.       FLEXERIL PO          hydrocortisone 1 % cream    CORTAID     30 g    Apply topically 2 times daily Apply sparingly to face rash twice daily.       LASIX PO      Take 20 mg by mouth       lidocaine visc 2% & diphenhydramine 12.5mg/5mL & maalox/mylanta w/simethicone (1:1:1 v/v/v) Susp compounding kit     237 mL    Swish and swallow 5-10 mLs in mouth every 6 hours as needed       loratadine 10 MG tablet    CLARITIN    30 tablet    Take 1 tablet by mouth daily as needed for allergies.       naproxen sodium 220 MG tablet    ANAPROX     Take 220 mg by mouth 2 times daily (with meals).       PANTOPRAZOLE SODIUM PO      Take 40 mg by mouth       potassium chloride 20 MEQ Packet    KLOR-CON     Take 10 mEq by mouth 2 times daily       PROPRANOLOL HCL PO      Take 20 mg by mouth       SODIUM BICARBONATE PO      Take 650 mg by mouth       traMADol 50 MG tablet    ULTRAM     Take 50 mg by mouth every 6 hours as needed.       TRAZODONE HCL PO      Take 50 mg by mouth       valACYclovir 1000 mg tablet    VALTREX    21 tablet    Take 1 tablet (1,000 mg) by mouth 3 times daily       zolpidem 5 MG tablet    AMBIEN     Take 5 mg by mouth nightly as needed.

## 2017-06-12 ENCOUNTER — HOSPITAL ENCOUNTER (EMERGENCY)
Facility: CLINIC | Age: 57
Discharge: HOME | End: 2017-06-12
Attending: EMERGENCY MEDICINE | Admitting: EMERGENCY MEDICINE
Payer: COMMERCIAL
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
## 2017-06-12 DIAGNOSIS — S81.811A LACERATION OF LEG, RIGHT, INITIAL ENCOUNTER: ICD-10-CM

## 2017-06-12 PROCEDURE — 99284 EMERGENCY DEPT VISIT MOD MDM: CPT | Mod: 25

## 2017-06-12 PROCEDURE — 12004 RPR S/N/AX/GEN/TRK7.6-12.5CM: CPT | Performed by: EMERGENCY MEDICINE

## 2017-06-12 PROCEDURE — 12004 RPR S/N/AX/GEN/TRK7.6-12.5CM: CPT

## 2017-06-12 PROCEDURE — 99284 EMERGENCY DEPT VISIT MOD MDM: CPT | Mod: 25 | Performed by: EMERGENCY MEDICINE

## 2017-06-12 ASSESSMENT — ENCOUNTER SYMPTOMS
NEUROLOGICAL NEGATIVE: 1
EYES NEGATIVE: 1
MUSCULOSKELETAL NEGATIVE: 1
WOUND: 1
HEMATOLOGIC/LYMPHATIC NEGATIVE: 1
RESPIRATORY NEGATIVE: 1
GASTROINTESTINAL NEGATIVE: 1
CARDIOVASCULAR NEGATIVE: 1
CONSTITUTIONAL NEGATIVE: 1
ENDOCRINE NEGATIVE: 1
ALLERGIC/IMMUNOLOGIC NEGATIVE: 1

## 2017-06-12 NOTE — ED AVS SNAPSHOT
[facility] Emergency Department    [address] [address]    Phone:  [phone]    Fax:  [phone]                                       Abhijeet Mccauley   MRN: [id number]    Department:  [facility] Emergency Department   Date of Visit:  6/12/2017           Patient Information     Date Of Birth          1960        Your diagnoses for this visit were:     Fall, initial encounter at home from standing height.    Laceration of leg, right, initial encounter 2 separate laceration. One is 8cm in length. second laceration is 3cm in length.       You were seen by Peter Dempsey MD.      Follow-up Information     Follow up with [facility] Emergency Department.    Specialty:  EMERGENCY MEDICINE    Why:  Return to urgent care for suture removal in 10 to 14 days as your report you do not have a primary care provider locally    Contact information:    [address] [place] [address] [id number] [phone]    Additional information:    The medical center is located at   [address] (between [facility] and   [facility] in [place], [place] miles north   of [place]).      Discharge References/Attachments     LACERATION, EXTREMITY: SUTURE, STAPLE, OR TAPE (ENGLISH)    FALL, MECHANICAL (ENGLISH)      24 Hour Appointment Hotline       To make an appointment at any [facility] clinic, call [phone] ([phone]). If you don't have a family doctor or clinic, we will help you find one. [facility] clinics are conveniently located to serve the needs of you and your family.             Review of your medicines      Our records show that you are taking the medicines listed below. If these are incorrect, please call your family doctor or clinic.        Dose / Directions Last dose taken    acetaminophen 500 MG tablet   Commonly known as:  TYLENOL   Dose:  1-2 tablet        Take 1-2 tablets by mouth every 6 hours as needed.   Refills:  0        albuterol 90 MCG/ACT inhaler    Dose:  2 puff   Quantity:  1 Inhaler        Inhale 2 puffs into the lungs every 4 hours as needed for shortness of breath / dyspnea.   Refills:  0        AMITRIPTYLINE HCL PO   Dose:  25 mg        Take 25 mg by mouth At Bedtime   Refills:  0        amLODIPine 5 MG tablet   Commonly known as:  NORVASC   Dose:  1 tablet   Quantity:  90 tablet        Take 1 tablet by mouth daily.   Refills:  2        atenolol 50 MG tablet   Commonly known as:  TENORMIN   Dose:  1 tablet   Quantity:  90 tablet        Take 1 tablet by mouth daily.   Refills:  2        FLEXERIL PO        Refills:  0        LASIX PO   Dose:  20 mg        Take 20 mg by mouth daily   Refills:  0        lidocaine visc 2% & diphenhydramine 12.5mg/5mL & maalox/mylanta w/simethicone (1:1:1 v/v/v) Susp compounding kit   Dose:  5-10 mL   Quantity:  237 mL        Swish and swallow 5-10 mLs in mouth every 6 hours as needed   Refills:  1        loratadine 10 MG tablet   Commonly known as:  CLARITIN   Dose:  10 mg   Quantity:  30 tablet        Take 1 tablet by mouth daily as needed for allergies.   Refills:  6        naproxen sodium 220 MG tablet   Commonly known as:  ANAPROX   Dose:  220 mg        Take 220 mg by mouth 2 times daily (with meals).   Refills:  0        PANTOPRAZOLE SODIUM PO   Dose:  40 mg        Take 40 mg by mouth every morning (before breakfast)   Refills:  0        potassium chloride 20 MEQ Packet   Commonly known as:  KLOR-CON   Dose:  10 mEq        Take 10 mEq by mouth 2 times daily   Refills:  0        PROPRANOLOL HCL PO   Dose:  20 mg        Take 20 mg by mouth   Refills:  0        SODIUM BICARBONATE PO   Dose:  650 mg        Take 650 mg by mouth daily as needed   Refills:  0        TRAZODONE HCL PO   Dose:  50 mg        Take 50 mg by mouth At Bedtime   Refills:  0        zolpidem 5 MG tablet   Commonly known as:  AMBIEN   Dose:  5 mg        Take 5 mg by mouth nightly as needed.   Refills:  0                Orders Needing Specimen Collection   "   None      Pending Results     No orders found from 6/10/2017 to 2017.            Pending Culture Results     No orders found from 6/10/2017 to 2017.            Pending Results Instructions     If you had any lab results that were not finalized at the time of your Discharge, you can call the ED Lab Result RN at [phone]. You will be contacted by this team for any positive Lab results or changes in treatment. The nurses are available 7 days a week from 10A to 6:30P.  You can leave a message 24 hours per day and they will return your call.        Test Results From Your Hospital Stay               Thank you for choosing [facility]       Thank you for choosing [facility] for your care. Our goal is always to provide you with excellent care. Hearing back from our patients is one way we can continue to improve our services. Please take a few minutes to complete the written survey that you may receive in the mail after you visit with us. Thank you!        "[employer] Information     [facility] lets you send messages to your doctor, view your test results, renew your prescriptions, schedule appointments and more. To sign up, go to [email or web address]hart . Click on \"Log in\" on the left side of the screen, which will take you to the Welcome page. Then click on \"Sign up Now\" on the right side of the page.     You will be asked to enter the access code listed below, as well as some personal information. Please follow the directions to create your username and password.     Your access code is: [email or web address]  Expires: 2017  2:21 PM     Your access code will  in 90 days. If you need help or a new code, please call your [facility] clinic or [phone].        Care EveryWhere ID     This is your Care EveryWhere ID. This could be used by other organizations to access your [facility] medical records  [id number]        After Visit Summary       This is your record. Keep this with you and show to your community " pharmacist(s) and doctor(s) at your next visit.

## 2017-06-12 NOTE — ED AVS SNAPSHOT
[facility] Emergency Department    [address] [place] [address]    Phone:  [phone]    Fax:  [phone]                                       Abhijeet Mccauley   MRN: [id number]    Department:  [facility] Emergency Department   Date of Visit:  6/12/2017           After Visit Summary Signature Page     I have received my discharge instructions, and my questions have been answered. I have discussed any challenges I see with this plan with the nurse or doctor.    ..........................................................................................................................................  Patient/Patient Representative Signature      ..........................................................................................................................................  Patient Representative Print Name and Relationship to Patient    ..................................................               ................................................  Date                                            Time    ..........................................................................................................................................  Reviewed by Signature/Title    ...................................................              ..............................................  Date                                                            Time

## 2017-06-12 NOTE — ED PROVIDER NOTES
History   No chief complaint on file.    HPI  Abhijeet Mccauley is a 56 year old male with a history of acute myelogenous leukemia about 5 years ago currently in remission, hypertension, and history of knee replacement about 3 years ago who presented by EMS after mechanical fall at his home.  Patient reports he lost control of a wine bottle that fell and shattered on the ground while he was trying to  to one bottle he sustained a laceration over his right lower leg.  He was able to apply pressure with bandage dressing and was subsequently brought to the emergency room for wound care and further evaluation.  He did strike his forehead reports he did not lose consciousness.     Social history: arrived by EMS alone. Here in ED alone.  Reports he lives in [facility] in the winter and Lives with his sister in [place] on a hobby farm.    Past medical history:  Patient Active Problem List   Diagnosis     Essential hypertension     AML (acute myelogenous leukemia) (H)     Sensorineural hearing loss, asymmetrical     Medications:  No current facility-administered medications for this encounter.      Current Outpatient Prescriptions   Medication     Furosemide (LASIX PO)     potassium chloride (KLOR-CON) 20 MEQ Packet     AMITRIPTYLINE HCL PO     Cyclobenzaprine HCl (FLEXERIL PO)     SODIUM BICARBONATE PO     PANTOPRAZOLE SODIUM PO     PROPRANOLOL HCL PO     TRAZODONE HCL PO     hydrocortisone (CORTAID) 1 % cream     DPH-Lido-AlHydr-MgHydr-Simeth (FIRST-MOUTHWASH BLM) SUSP     valACYclovir (VALTREX) 1000 mg tablet     aspirin 325 MG tablet     tramadol (ULTRAM) 50 MG tablet     loratadine (CLARITIN) 10 MG tablet     amLODIPine (NORVASC) 5 MG tablet     atenolol (TENORMIN) 50 MG tablet     albuterol 90 MCG/ACT inhaler     acetaminophen (TYLENOL) 500 MG tablet     naproxen sodium (ANAPROX) 220 MG tablet     zolpidem (AMBIEN) 5 MG tablet     Allergies:     Allergies   Allergen Reactions     Allergy      Ibuprofen      Motrin [Nsaids]      Pepcid      Vfend      IV     I have reviewed the Medications, Allergies, Past Medical and Surgical History, and Social History in the Epic system.         Review of Systems   Constitutional: Negative.    HENT: Negative.    Eyes: Negative.    Respiratory: Negative.    Cardiovascular: Negative.    Gastrointestinal: Negative.    Endocrine: Negative.    Genitourinary: Negative.    Musculoskeletal: Negative.    Skin: Positive for wound.   Allergic/Immunologic: Negative.    Neurological: Negative.    Hematological: Negative.        Physical Exam      Physical Exam   Constitutional: He is oriented to person, place, and time. He appears well-developed and well-nourished. No distress.   HENT:   Head: Normocephalic and atraumatic.   Eyes: Conjunctivae and EOM are normal. Pupils are equal, round, and reactive to light. Right eye exhibits no discharge. Left eye exhibits no discharge. No scleral icterus.   Neck: Normal range of motion. Neck supple. No JVD present. No tracheal deviation present. No thyromegaly present.   Pulmonary/Chest: No stridor.   Abdominal: Soft.   Musculoskeletal: He exhibits tenderness.        Right knee: He exhibits laceration (8 cm horizontal laceration overlying mid shin, mid tibia, another separate laceration, 3cm horiizontal laceration). Tenderness found.        Legs:  Lymphadenopathy:     He has no cervical adenopathy.   Neurological: He is alert and oriented to person, place, and time.   Skin: No rash noted. He is not diaphoretic. No erythema. No pallor.   Psychiatric: He has a normal mood and affect. His behavior is normal. Judgment and thought content normal.       ED Course     ED Course     Procedures             Critical Care time:  none               Labs Ordered and Resulted from Time of ED Arrival Up to the Time of Departure from the ED - No data to display  ED medications: 1% lidocaine with epinephrine.    ED labs and imaging none    ED Vitals: See Vitals record during  EPIC downtime.    Assessments & Plan (with Medical Decision Making)   Clinical impression: Pleasant 56-year-old male who presented by EMS for right lower leg laceration.  He has 2 separate lacerations.  One laceration is 8 cm horizontal in nature bleeding controlled with pressure, another laceration is 3 cm  just beneath the longer and larger laceration about the mid tibia.  Wound was explored there is no foreign body noted specifically no clots appreciated.  Anesthesia with 1% lidocaine with epinephrine helped with one care irrigation and exploration.      ED course and Plan:   after the wound was irrigated with 2 L of normal saline and the wound was explored by probing there was no foreign body appreciated.  Wound edges were well approximated with 5-0 Ethilon suture in simple interrupted fashion ×6 stitches and an additional 8 stitches with 4 Ethilon suture were applied to the small laceration that is 3 cm in horizontal in nature.  Patient tolerated procedure well.  Anesthesia was with 1% lidocaine with epinephrine 10 cc total used.  Wound was covered with topical antibiotic ointment. A Band-Aid was applied.  Patient reports he does not have a primary care provider locally as is currently spending the summer at his sister's [place].  I instructed the patient to return to urgent care in 10-14 days for suture removal.  Signs for wound infection was reviewed with the patient's.  Wound care instructions were also reviewed the patient expressed understanding.         Disclaimer: This note consists of symbols derived from keyboarding, dictation and/or voice recognition software. As a result, there may be errors in the script that have gone undetected. Please consider this when interpreting information found in this chart.  I have reviewed the nursing notes.    I have reviewed the findings, diagnosis, plan and need for follow up with the patient.       New Prescriptions    No medications on file       Final diagnoses:    Fall, initial encounter - at home from standing height.   Laceration of leg, right, initial encounter - 2 separate laceration. One is 8cm in length. second laceration is 3cm in length.       6/12/2017   [facility] EMERGENCY DEPARTMENT     Peter Dempsey MD  06/12/17 9481

## 2017-06-26 ENCOUNTER — HOSPITAL ENCOUNTER (EMERGENCY)
Facility: CLINIC | Age: 57
Discharge: HOME | End: 2017-06-26
Attending: EMERGENCY MEDICINE | Admitting: EMERGENCY MEDICINE
Payer: COMMERCIAL

## 2017-06-26 ENCOUNTER — APPOINTMENT (OUTPATIENT)
Dept: ULTRASOUND IMAGING | Facility: CLINIC | Age: 57
End: 2017-06-26
Attending: EMERGENCY MEDICINE
Payer: COMMERCIAL

## 2017-06-26 VITALS
TEMPERATURE: 98.1 F | WEIGHT: 183 LBS | SYSTOLIC BLOOD PRESSURE: 129 MMHG | DIASTOLIC BLOOD PRESSURE: 66 MMHG | OXYGEN SATURATION: 99 % | RESPIRATION RATE: 18 BRPM

## 2017-06-26 DIAGNOSIS — L03.115 CELLULITIS OF RIGHT LOWER EXTREMITY: ICD-10-CM

## 2017-06-26 DIAGNOSIS — S81.811A: ICD-10-CM

## 2017-06-26 LAB
ANION GAP SERPL CALCULATED.3IONS-SCNC: 9 MMOL/L (ref 3–14)
BASOPHILS # BLD AUTO: 0 10E9/L (ref 0–0.2)
BASOPHILS NFR BLD AUTO: 0.3 %
BUN SERPL-MCNC: 21 MG/DL (ref 7–30)
CALCIUM SERPL-MCNC: 8.7 MG/DL (ref 8.5–10.1)
CHLORIDE SERPL-SCNC: 106 MMOL/L (ref 94–109)
CO2 SERPL-SCNC: 22 MMOL/L (ref 20–32)
CREAT SERPL-MCNC: 1.98 MG/DL (ref 0.66–1.25)
DIFFERENTIAL METHOD BLD: ABNORMAL
EOSINOPHIL # BLD AUTO: 0.3 10E9/L (ref 0–0.7)
EOSINOPHIL NFR BLD AUTO: 2.4 %
ERYTHROCYTE [DISTWIDTH] IN BLOOD BY AUTOMATED COUNT: 16.8 % (ref 10–15)
GFR SERPL CREATININE-BSD FRML MDRD: 35 ML/MIN/1.7M2
GLUCOSE SERPL-MCNC: 88 MG/DL (ref 70–99)
HCT VFR BLD AUTO: 33.6 % (ref 40–53)
HGB BLD-MCNC: 10.9 G/DL (ref 13.3–17.7)
IMM GRANULOCYTES # BLD: 0 10E9/L (ref 0–0.4)
IMM GRANULOCYTES NFR BLD: 0.3 %
LACTATE BLD-SCNC: 1.8 MMOL/L (ref 0.7–2.1)
LYMPHOCYTES # BLD AUTO: 2 10E9/L (ref 0.8–5.3)
LYMPHOCYTES NFR BLD AUTO: 17.6 %
MCH RBC QN AUTO: 31.9 PG (ref 26.5–33)
MCHC RBC AUTO-ENTMCNC: 32.4 G/DL (ref 31.5–36.5)
MCV RBC AUTO: 98 FL (ref 78–100)
MONOCYTES # BLD AUTO: 3 10E9/L (ref 0–1.3)
MONOCYTES NFR BLD AUTO: 26.5 %
NEUTROPHILS # BLD AUTO: 6.1 10E9/L (ref 1.6–8.3)
NEUTROPHILS NFR BLD AUTO: 52.9 %
PLATELET # BLD AUTO: 116 10E9/L (ref 150–450)
POTASSIUM SERPL-SCNC: 4.5 MMOL/L (ref 3.4–5.3)
RBC # BLD AUTO: 3.42 10E12/L (ref 4.4–5.9)
SODIUM SERPL-SCNC: 137 MMOL/L (ref 133–144)
WBC # BLD AUTO: 11.5 10E9/L (ref 4–11)

## 2017-06-26 PROCEDURE — 83605 ASSAY OF LACTIC ACID: CPT | Performed by: EMERGENCY MEDICINE

## 2017-06-26 PROCEDURE — 85025 COMPLETE CBC W/AUTO DIFF WBC: CPT | Performed by: EMERGENCY MEDICINE

## 2017-06-26 PROCEDURE — 80048 BASIC METABOLIC PNL TOTAL CA: CPT | Performed by: EMERGENCY MEDICINE

## 2017-06-26 PROCEDURE — 99284 EMERGENCY DEPT VISIT MOD MDM: CPT | Mod: 25

## 2017-06-26 PROCEDURE — 25000132 ZZH RX MED GY IP 250 OP 250 PS 637: Performed by: EMERGENCY MEDICINE

## 2017-06-26 PROCEDURE — 93971 EXTREMITY STUDY: CPT | Mod: RT

## 2017-06-26 PROCEDURE — 99284 EMERGENCY DEPT VISIT MOD MDM: CPT | Performed by: EMERGENCY MEDICINE

## 2017-06-26 RX ORDER — CEPHALEXIN 500 MG/1
1000 CAPSULE ORAL 4 TIMES DAILY
Qty: 56 CAPSULE | Refills: 0 | Status: SHIPPED | OUTPATIENT
Start: 2017-06-26 | End: 2017-07-03

## 2017-06-26 RX ORDER — CEPHALEXIN 500 MG/1
1000 CAPSULE ORAL EVERY 6 HOURS SCHEDULED
Status: DISCONTINUED | OUTPATIENT
Start: 2017-06-26 | End: 2017-06-26 | Stop reason: HOSPADM

## 2017-06-26 RX ADMIN — CEPHALEXIN 1000 MG: 500 CAPSULE ORAL at 14:11

## 2017-06-26 ASSESSMENT — ENCOUNTER SYMPTOMS
NAUSEA: 1
FEVER: 0
CHILLS: 1

## 2017-06-26 NOTE — ED AVS SNAPSHOT
[facility] Emergency Department    [address] [address]    Phone:  [phone]    Fax:  [phone]                                       Abhijeet Mccauley   MRN: [id number]    Department:  [facility] Emergency Department   Date of Visit:  6/26/2017           Patient Information     Date Of Birth          1960        Your diagnoses for this visit were:     Laceration of leg not thigh, right, complicated, initial encounter     Cellulitis of right lower extremity        You were seen by Dillon Sparks MD.        Discharge Instructions         Discharge Instructions for Cellulitis  You have been diagnosed with cellulitis. This is an infection in the deepest layer of the skin. In some cases, the infection also affects the muscle. Cellulitis is caused by bacteria. The bacteria can enter the body through broken skin. This can happen with a cut, scratch, animal bite, or an insect bite that has been scratched. You may have been treated in the hospital with antibiotics and fluids. You will likely be given a prescription for antibiotics to take at home. This sheet will help you take care of yourself at home.  Home care  When you are home:    Take the prescribed antibiotic medicine you are given as directed until it is gone. Take it even if you feel better. It treats the infection and stops it from returning. Not taking all the medicine can make future infections hard to treat.    Keep the infected area clean.    When possible, raise the infected area above the level of your heart. This helps keep swelling down.    Talk with your healthcare provider if you are in pain. Ask what kind of over-the-counter medicine you can take for pain.    Apply clean bandages as advised.    Take your temperature once a day for a week.    Wash your hands often to prevent spreading the infection.  In the future, wash your hands before and after you touch cuts, scratches, or bandages. This will help prevent  infection.   When to call your healthcare provider  Call your healthcare provider immediately if you have any of the following:    Difficulty or pain when moving the joints above or below the infected area    Discharge or pus draining from the area    Fever of 100.4 F (38 C) or higher, or as directed by your healthcare provider    Pain that gets worse in or around the infected     Redness that gets worse in or around the infected area, particularly if the area of redness expands to a wider area    Shaking chills    Swelling of the infected area    Vomiting   Date Last Reviewed: 8/1/2016 2000-2017 [employer]. [address] [place] [address]. All rights reserved. This information is not intended as a substitute for professional medical care. Always follow your healthcare professional's instructions.          Cellulitis  Cellulitis is an infection of the deep layers of skin. A break in the skin, such as a cut or scratch, can let bacteria under the skin. If the bacteria get to deep layers of the skin, it can be serious. If not treated, cellulitis can get into the bloodstream and lymph nodes. The infection can then spread throughout the body. This causes serious illness.  Cellulitis causes the affected skin to become red, swollen, warm, and sore. The reddened areas have a visible border. An open sore may leak fluid (pus). You may have a fever, chills, and pain.  Cellulitis is treated with antibiotics taken for 7 to 10 days. An open sore may be cleaned and covered with cool wet gauze. Symptoms should get better 1 to 2 days after treatment is started. Make sure to take all the antibiotics for the full number of days until they are gone. Keep taking the medicine even if your symptoms go away.  Home care  Follow these tips:    Limit the use of the part of your body with cellulitis.     If the infection is on your leg, keep your leg raised while sitting. This will help to reduce swelling.    Take  all of the antibiotic medicine exactly as directed until it is gone. Do not miss any doses, especially during the first 7 days. Don t stop taking the medicine when your symptoms get better.    Keep the affected area clean and dry.    Wash your hands with soap and warm water before and after touching your skin. Anyone else who touches your skin should also wash his or her hands. Don't share towels.  Follow-up care  Follow up with your healthcare provider, or as advised. If your infection does not go away on the first antibiotic, your healthcare provider will prescribe a different one.  When to seek medical advice  Call your healthcare provider right away if any of these occur:    Red areas that spread    Swelling or pain that gets worse    Fluid leaking from the skin (pus)    Fever higher of 100.4  F (38.0  C) or higher after 2 days on antibiotics  Date Last Reviewed: 9/1/2016 2000-2017 [employer]. [address] [place] [address]. All rights reserved. This information is not intended as a substitute for professional medical care. Always follow your healthcare professional's instructions.     follow-up in clinic as scheduled, return here as outlined above.  Warm packs 4-5 times daily, elevate right lower extremity above the level of heart, change dressing 2 times daily, apply bacitracin and then gauze.      24 Hour Appointment Hotline       To make an appointment at any [facility], call [phone] ([phone]). If you don't have a family doctor or clinic, we will help you find one. [facility] [place] clinics are conveniently located to serve the needs of you and your family.             Review of your medicines      Our records show that you are taking the medicines listed below. If these are incorrect, please call your family doctor or clinic.        Dose / Directions Last dose taken    acetaminophen 500 MG tablet   Commonly known as:  TYLENOL   Dose:  1-2 tablet        Take 1-2 tablets  by mouth every 6 hours as needed.   Refills:  0        albuterol 90 MCG/ACT inhaler   Dose:  2 puff   Quantity:  1 Inhaler        Inhale 2 puffs into the lungs every 4 hours as needed for shortness of breath / dyspnea.   Refills:  0        AMITRIPTYLINE HCL PO   Dose:  25 mg        Take 25 mg by mouth At Bedtime   Refills:  0        amLODIPine 5 MG tablet   Commonly known as:  NORVASC   Dose:  1 tablet   Quantity:  90 tablet        Take 1 tablet by mouth daily.   Refills:  2        atenolol 50 MG tablet   Commonly known as:  TENORMIN   Dose:  1 tablet   Quantity:  90 tablet        Take 1 tablet by mouth daily.   Refills:  2        FLEXERIL PO        Refills:  0        LASIX PO   Dose:  20 mg        Take 20 mg by mouth daily   Refills:  0        lidocaine visc 2% & diphenhydramine 12.5mg/5mL & maalox/mylanta w/simethicone (1:1:1 v/v/v) Susp compounding kit   Dose:  5-10 mL   Quantity:  237 mL        Swish and swallow 5-10 mLs in mouth every 6 hours as needed   Refills:  1        loratadine 10 MG tablet   Commonly known as:  CLARITIN   Dose:  10 mg   Quantity:  30 tablet        Take 1 tablet by mouth daily as needed for allergies.   Refills:  6        naproxen sodium 220 MG tablet   Commonly known as:  ANAPROX   Dose:  220 mg        Take 220 mg by mouth 2 times daily (with meals).   Refills:  0        PANTOPRAZOLE SODIUM PO   Dose:  40 mg        Take 40 mg by mouth every morning (before breakfast)   Refills:  0        potassium chloride 20 MEQ Packet   Commonly known as:  KLOR-CON   Dose:  10 mEq        Take 10 mEq by mouth 2 times daily   Refills:  0        PROPRANOLOL HCL PO   Dose:  20 mg        Take 20 mg by mouth   Refills:  0        SODIUM BICARBONATE PO   Dose:  650 mg        Take 650 mg by mouth daily as needed   Refills:  0        TRAZODONE HCL PO   Dose:  50 mg        Take 50 mg by mouth At Bedtime   Refills:  0        zolpidem 5 MG tablet   Commonly known as:  AMBIEN   Dose:  5 mg        Take 5 mg by mouth  nightly as needed.   Refills:  0          ASK your doctor about these medications        Dose / Directions Last dose taken    cephALEXin 500 MG capsule   Commonly known as:  KEFLEX   Dose:  1000 mg   Quantity:  56 capsule   Ask about: Should I take this medication?        Take 2 capsules (1,000 mg) by mouth 4 times daily for 7 days   Refills:  0                Prescriptions were sent or printed at these locations (1 Prescription)                   [facility] [place], [place] - [address]   [address] [address]    Telephone:  [phone]   Fax:  [phone]   Hours:                  E-Prescribed (1 of 1)         cephALEXin (KEFLEX) 500 MG capsule                Procedures and tests performed during your visit     Basic metabolic panel    CBC with platelets, differential    Lactic acid whole blood    US Lower Extremity Venous Duplex Right      Orders Needing Specimen Collection     None      Pending Results     No orders found from 6/24/2017 to 6/27/2017.            Pending Culture Results     No orders found from 6/24/2017 to 6/27/2017.            Pending Results Instructions     If you had any lab results that were not finalized at the time of your Discharge, you can call the ED Lab Result RN at [phone]. You will be contacted by this team for any positive Lab results or changes in treatment. The nurses are available 7 days a week from 10A to 6:30P.  You can leave a message 24 hours per day and they will return your call.        Test Results From Your Hospital Stay        6/26/2017  3:02 PM      Component Results     Component Value Ref Range & Units Status    WBC 11.5 (H) 4.0 - 11.0 10e9/L Final    RBC Count 3.42 (L) 4.4 - 5.9 10e12/L Final    Hemoglobin 10.9 (L) 13.3 - 17.7 g/dL Final    Hematocrit 33.6 (L) 40.0 - 53.0 % Final    MCV 98 78 - 100 fl Final    MCH 31.9 26.5 - 33.0 pg Final    MCHC 32.4 31.5 - 36.5 g/dL Final    RDW 16.8 (H) 10.0 - 15.0 % Final    Platelet  Count 116 (L) 150 - 450 10e9/L Final    Diff Method Automated Method  Final    % Neutrophils 52.9 % Final    % Lymphocytes 17.6 % Final    % Monocytes 26.5 % Final    % Eosinophils 2.4 % Final    % Basophils 0.3 % Final    % Immature Granulocytes 0.3 % Final    Absolute Neutrophil 6.1 1.6 - 8.3 10e9/L Final    Absolute Lymphocytes 2.0 0.8 - 5.3 10e9/L Final    Absolute Monocytes 3.0 (H) 0.0 - 1.3 10e9/L Final    Absolute Eosinophils 0.3 0.0 - 0.7 10e9/L Final    Absolute Basophils 0.0 0.0 - 0.2 10e9/L Final    Abs Immature Granulocytes 0.0 0 - 0.4 10e9/L Final         6/26/2017  1:46 PM      Component Results     Component Value Ref Range & Units Status    Lactic Acid 1.8 0.7 - 2.1 mmol/L Final         6/26/2017  2:00 PM      Component Results     Component Value Ref Range & Units Status    Sodium 137 133 - 144 mmol/L Final    Potassium 4.5 3.4 - 5.3 mmol/L Final    Chloride 106 94 - 109 mmol/L Final    Carbon Dioxide 22 20 - 32 mmol/L Final    Anion Gap 9 3 - 14 mmol/L Final    Glucose 88 70 - 99 mg/dL Final    Urea Nitrogen 21 7 - 30 mg/dL Final    Creatinine 1.98 (H) 0.66 - 1.25 mg/dL Final    GFR Estimate 35 (L) >60 mL/min/1.7m2 Final    Non  GFR Calc    GFR Estimate If Black 42 (L) >60 mL/min/1.7m2 Final    African American GFR Calc    Calcium 8.7 8.5 - 10.1 mg/dL Final         6/26/2017  4:31 PM      Narrative     ULTRASOUND  LOWER EXTREMITY VENOUS DUPLEX RIGHT   6/26/2017 2:16 PM     HISTORY: Posttraumatic cellulitis, rule out deep tissue  infection/abscess/deep venous thrombosis.    COMPARISON: None.    FINDINGS: Gray-scale, color and Doppler spectral analysis ultrasound  was performed of the right leg. Compression and augmentation imaging  was performed.    There is no evidence for deep venous thrombosis.        Impression     IMPRESSION: No evidence for DVT within the right leg.    GORDY CASTRO MD                Thank you for choosing [facility]       Thank you for choosing [facility] for your  "care. Our goal is always to provide you with excellent care. Hearing back from our patients is one way we can continue to improve our services. Please take a few minutes to complete the written survey that you may receive in the mail after you visit with us. Thank you!        [employer] Information     [employer] lets you send messages to your doctor, view your test results, renew your prescriptions, schedule appointments and more. To sign up, go to [email or web address] . Click on \"Log in\" on the left side of the screen, which will take you to the Welcome page. Then click on \"Sign up Now\" on the right side of the page.     You will be asked to enter the access code listed below, as well as some personal information. Please follow the directions to create your username and password.     Your access code is: [email or web address]  Expires: 2017  3:05 PM     Your access code will  in 90 days. If you need help or a new code, please call your [place] clinic or [phone].        Care EveryWhere ID     This is your Care EveryWhere ID. This could be used by other organizations to access your [place] medical records  [id number]        Equal Access to Services     JOGRE COOL AH: Ashwin Huddleston, wajose m underwood, qaybevelyne kaalmamilo fournier, song ureña. So [facility] [phone].    ATENCIÓN: Si habla español, tiene a uriostegui disposición servicios gratuitos de asistencia lingüística. Llame al [phone].    We comply with applicable federal civil rights laws and [place] laws. We do not discriminate on the basis of race, color, national origin, age, disability sex, sexual orientation or gender identity.            After Visit Summary       This is your record. Keep this with you and show to your community pharmacist(s) and doctor(s) at your next visit.                  "

## 2017-06-26 NOTE — DISCHARGE INSTRUCTIONS

## 2017-06-26 NOTE — ED NOTES
Pt had laceration with sutures on right lower leg on 6/12/17 at [facility].   Pt had follow up appointment last Friday with primary provider and had sutures removed.   Pt reports redness, edema and creamy pus discharge from wound on right lower leg.   Provider in room with pt.

## 2017-06-26 NOTE — ED PROVIDER NOTES
History     Chief Complaint   Patient presents with     Cellulitis     left lower leg wound     HPI  Abhijeet Mccauley is a 56 year old male with a history of hypertension and acute myelogenous leukemia who presents with cellulitis. The patient was seen in the ED on 06/12/17 for a laceration on his left lower leg after he dropped a wine bottle. He had his stitches removed on 06/23/17. Over the past three days he has worsening pain and edema of his left lower leg. The redness surrounding his laceration has receded over the pat few days. His pain increased three days ago after he became more active and was walking around more. His leg has been weeping since his stitches were removed. The patient has experienced some chills and nausea. The patient denies a known fever and feeling faint. No history of cellulitis. His tetanus is up to date. He drinks a 6 pack a day. The patient lives in this area during the [place] with his sister and doesn't have a primary provider.  Denies chest pain shortness of air.  No prior history of DVT/PE.      Patient Active Problem List   Diagnosis     Essential hypertension     AML (acute myelogenous leukemia) (H)     Sensorineural hearing loss, asymmetrical     Current Outpatient Prescriptions   Medication Sig Dispense Refill     cephALEXin (KEFLEX) 500 MG capsule Take 2 capsules (1,000 mg) by mouth 4 times daily for 7 days 56 capsule 0     Furosemide (LASIX PO) Take 20 mg by mouth daily        potassium chloride (KLOR-CON) 20 MEQ Packet Take 10 mEq by mouth 2 times daily       AMITRIPTYLINE HCL PO Take 25 mg by mouth At Bedtime        Cyclobenzaprine HCl (FLEXERIL PO)        SODIUM BICARBONATE PO Take 650 mg by mouth daily as needed        PANTOPRAZOLE SODIUM PO Take 40 mg by mouth every morning (before breakfast)        PROPRANOLOL HCL PO Take 20 mg by mouth       TRAZODONE HCL PO Take 50 mg by mouth At Bedtime        DPH-Lido-AlHydr-MgHydr-Simeth (FIRST-MOUTHWASH BLM) SUSP Swish and  swallow 5-10 mLs in mouth every 6 hours as needed 237 mL 1     loratadine (CLARITIN) 10 MG tablet Take 1 tablet by mouth daily as needed for allergies. 30 tablet 6     amLODIPine (NORVASC) 5 MG tablet Take 1 tablet by mouth daily. 90 tablet 2     atenolol (TENORMIN) 50 MG tablet Take 1 tablet by mouth daily. 90 tablet 2     albuterol 90 MCG/ACT inhaler Inhale 2 puffs into the lungs every 4 hours as needed for shortness of breath / dyspnea. 1 Inhaler 0     acetaminophen (TYLENOL) 500 MG tablet Take 1-2 tablets by mouth every 6 hours as needed.       naproxen sodium (ANAPROX) 220 MG tablet Take 220 mg by mouth 2 times daily (with meals).       zolpidem (AMBIEN) 5 MG tablet Take 5 mg by mouth nightly as needed.       Allergies   Allergen Reactions     Allergy      Ibuprofen     Motrin [Nsaids]      Pepcid Cramps     Severe abdominal cramps     Vancomycin      Other reaction(s): Other  hallucinations     Vfend      IV     I have reviewed the Medications, Allergies, Past Medical and Surgical History, and Social History in the Epic system.    Allergies:   Allergies   Allergen Reactions     Allergy      Ibuprofen     Motrin [Nsaids]      Pepcid Cramps     Severe abdominal cramps     Vancomycin      Other reaction(s): Other  hallucinations     Vfend      IV         No current facility-administered medications on file prior to encounter.   Current Outpatient Prescriptions on File Prior to Encounter:  Furosemide (LASIX PO) Take 20 mg by mouth daily    potassium chloride (KLOR-CON) 20 MEQ Packet Take 10 mEq by mouth 2 times daily   AMITRIPTYLINE HCL PO Take 25 mg by mouth At Bedtime    Cyclobenzaprine HCl (FLEXERIL PO)    SODIUM BICARBONATE PO Take 650 mg by mouth daily as needed    PANTOPRAZOLE SODIUM PO Take 40 mg by mouth every morning (before breakfast)    PROPRANOLOL HCL PO Take 20 mg by mouth   TRAZODONE HCL PO Take 50 mg by mouth At Bedtime    DPH-Lido-AlHydr-MgHydr-Simeth (FIRST-MOUTHWASH BLM) SUSP Swish and swallow  5-10 mLs in mouth every 6 hours as needed   loratadine (CLARITIN) 10 MG tablet Take 1 tablet by mouth daily as needed for allergies.   amLODIPine (NORVASC) 5 MG tablet Take 1 tablet by mouth daily.   atenolol (TENORMIN) 50 MG tablet Take 1 tablet by mouth daily.   albuterol 90 MCG/ACT inhaler Inhale 2 puffs into the lungs every 4 hours as needed for shortness of breath / dyspnea.   acetaminophen (TYLENOL) 500 MG tablet Take 1-2 tablets by mouth every 6 hours as needed.   naproxen sodium (ANAPROX) 220 MG tablet Take 220 mg by mouth 2 times daily (with meals).   zolpidem (AMBIEN) 5 MG tablet Take 5 mg by mouth nightly as needed.       Patient Active Problem List   Diagnosis     Essential hypertension     AML (acute myelogenous leukemia) (H)     Sensorineural hearing loss, asymmetrical       No past surgical history on file.    Social History   Substance Use Topics     Smoking status: Current Every Day Smoker     Packs/day: 0.50     Years: 30.00     Types: Cigarettes     Smokeless tobacco: Never Used      Comment: declined quit plan     Alcohol use Yes      Comment: 6 pack a day       Most Recent Immunizations   Administered Date(s) Administered     Influenza (H1N1) 12/31/2009     Influenza (IIV3) 10/27/2010       BMI: There is no height or weight on file to calculate BMI.       Review of Systems   Constitutional: Positive for chills. Negative for fever.   Gastrointestinal: Positive for nausea.     All other systems were reviewed and are negative.     Physical Exam   BP: 150/87  Heart Rate: 78  Temp: 98.1  F (36.7  C)  Resp: 18  Weight: 83 kg (183 lb)  SpO2: 98 %    Physical Exam   Nontoxic in no respiratory distress alert and oriented ×3  Head atraumatic normocephalic  Right lower extremity exam significant for moderate swelling from proximal 3rd of shin to distal shin, associated redness, warmth, has 2 large lacerations that had dehisced and are in the process of healing.  Fair amount of serous drainage, no alejandrina  purulent drainage, diffusely tender, no fluctuance appreciated, calf is moderately tender as well, sensation intact distally, DP/PT pulses intact.    ED Course     ED Course     Procedures             Critical Care time:  none               Labs Ordered and Resulted from Time of ED Arrival Up to the Time of Departure from the ED   CBC WITH PLATELETS DIFFERENTIAL - Abnormal; Notable for the following:        Result Value    WBC 11.5 (*)     RBC Count 3.42 (*)     Hemoglobin 10.9 (*)     Hematocrit 33.6 (*)     RDW 16.8 (*)     Platelet Count 116 (*)     Absolute Monocytes 3.0 (*)     All other components within normal limits   BASIC METABOLIC PANEL - Abnormal; Notable for the following:     Creatinine 1.98 (*)     GFR Estimate 35 (*)     GFR Estimate If Black 42 (*)     All other components within normal limits   LACTIC ACID WHOLE BLOOD       Results for orders placed or performed during the hospital encounter of 06/26/17 (from the past 24 hour(s))   CBC with platelets, differential   Result Value Ref Range    WBC 11.5 (H) 4.0 - 11.0 10e9/L    RBC Count 3.42 (L) 4.4 - 5.9 10e12/L    Hemoglobin 10.9 (L) 13.3 - 17.7 g/dL    Hematocrit 33.6 (L) 40.0 - 53.0 %    MCV 98 78 - 100 fl    MCH 31.9 26.5 - 33.0 pg    MCHC 32.4 31.5 - 36.5 g/dL    RDW 16.8 (H) 10.0 - 15.0 %    Platelet Count 116 (L) 150 - 450 10e9/L    Diff Method Automated Method     % Neutrophils 52.9 %    % Lymphocytes 17.6 %    % Monocytes 26.5 %    % Eosinophils 2.4 %    % Basophils 0.3 %    % Immature Granulocytes 0.3 %    Absolute Neutrophil 6.1 1.6 - 8.3 10e9/L    Absolute Lymphocytes 2.0 0.8 - 5.3 10e9/L    Absolute Monocytes 3.0 (H) 0.0 - 1.3 10e9/L    Absolute Eosinophils 0.3 0.0 - 0.7 10e9/L    Absolute Basophils 0.0 0.0 - 0.2 10e9/L    Abs Immature Granulocytes 0.0 0 - 0.4 10e9/L   Lactic acid whole blood   Result Value Ref Range    Lactic Acid 1.8 0.7 - 2.1 mmol/L   Basic metabolic panel   Result Value Ref Range    Sodium 137 133 - 144 mmol/L     Potassium 4.5 3.4 - 5.3 mmol/L    Chloride 106 94 - 109 mmol/L    Carbon Dioxide 22 20 - 32 mmol/L    Anion Gap 9 3 - 14 mmol/L    Glucose 88 70 - 99 mg/dL    Urea Nitrogen 21 7 - 30 mg/dL    Creatinine 1.98 (H) 0.66 - 1.25 mg/dL    GFR Estimate 35 (L) >60 mL/min/1.7m2    GFR Estimate If Black 42 (L) >60 mL/min/1.7m2    Calcium 8.7 8.5 - 10.1 mg/dL   US Lower Extremity Venous Duplex Right    Narrative    ULTRASOUND  LOWER EXTREMITY VENOUS DUPLEX RIGHT   6/26/2017 2:16 PM     HISTORY: Posttraumatic cellulitis, rule out deep tissue  infection/abscess/deep venous thrombosis.    COMPARISON: None.    FINDINGS: Gray-scale, color and Doppler spectral analysis ultrasound  was performed of the right leg. Compression and augmentation imaging  was performed.    There is no evidence for deep venous thrombosis.      Impression    IMPRESSION: No evidence for DVT within the right leg.    GORDY CASTRO MD       Medications - No data to display    1:12 PM patient assessed.     Assessments & Plan (with Medical Decision Making)  56-year-old male with a history of acute leukocytic leukemia presents with right leg swelling and redness, tenderness, consistent with posttraumatic soft tissue infection.  No evidence for deep tissue infection, abscess or DVT by ultrasound.  Workup essentially unremarkable, patient tolerating infection us far.  Reasonable for outpatient treatment.  Cephalexin 1 g 4 times a day times next 7 days, warm packs, elevate, follow up appointment made for 6/28 to reevaluate cellulitis and establish primary care.  Return criteria reviewed.       I have reviewed the nursing notes.    I have reviewed the findings, diagnosis, plan and need for follow up with the patient.       Discharge Medication List as of 6/26/2017  3:05 PM      START taking these medications    Details   cephALEXin (KEFLEX) 500 MG capsule Take 2 capsules (1,000 mg) by mouth 4 times daily for 7 days, Disp-56 capsule, R-0, E-Prescribe             Final  diagnoses:   Laceration of leg not thigh, right, complicated, initial encounter   Cellulitis of right lower extremity     This document serves as a record of the services and decisions personally performed and made by Dillon Sparks MD. It was created on HIS/HER behalf by Jen Ordaz, a trained medical scribe. The creation of this document is based the provider's statements to the medical scribe.  Jen Ordaz 1:12 PM 6/26/2017    Provider:   The information in this document, created by the medical scribe for me, accurately reflects the services I personally performed and the decisions made by me. I have reviewed and approved this document for accuracy prior to leaving the patient care area.  Dillon Sparks MD 1:12 PM 6/26/2017 6/26/2017   [facility] EMERGENCY DEPARTMENT     Dillon Sparks MD  06/26/17 [id number]

## 2017-06-26 NOTE — ED AVS SNAPSHOT
[facility]    [address] [address]    Phone:  [phone]    Fax:  [phone]                                       Abhijeet Mccauley   MRN: [id number]    Department:  [facility]   Date of Visit:  6/26/2017           After Visit Summary Signature Page     I have received my discharge instructions, and my questions have been answered. I have discussed any challenges I see with this plan with the nurse or doctor.    ..........................................................................................................................................  Patient/Patient Representative Signature      ..........................................................................................................................................  Patient Representative Print Name and Relationship to Patient    ..................................................               ................................................  Date                                            Time    ..........................................................................................................................................  Reviewed by Signature/Title    ...................................................              ..............................................  Date                                                            Time

## 2017-06-28 ENCOUNTER — NURSE TRIAGE (OUTPATIENT)
Dept: NURSING | Facility: CLINIC | Age: 57
End: 2017-06-28

## 2017-06-29 ENCOUNTER — OFFICE VISIT (OUTPATIENT)
Dept: AUDIOLOGY | Facility: CLINIC | Age: 57
End: 2017-06-29
Payer: COMMERCIAL

## 2017-06-29 DIAGNOSIS — H90.3 SENSORINEURAL HEARING LOSS, ASYMMETRICAL: Primary | ICD-10-CM

## 2017-06-29 PROCEDURE — 92593 HC HEARING AID CHECK, BINAURAL: CPT | Performed by: AUDIOLOGIST

## 2017-06-29 NOTE — PROGRESS NOTES
56 year old male comes in with his 2014 Phonak Audeo [id number] hearing aids stating that they again are not working.  The hearing aids were purchased at another facility.    Examination reveals the wax guards are plugged bilaterally. Again demonstrated to patient how to change. He was able to change in office. Reviewed with patient that this has been the problem the last 3 times he has come in. See chart in the hearing aid room.     Patient happy hearing aids are working again.     Return to clinic as needed,    Ai KUNZ, #[id number]

## 2017-06-29 NOTE — MR AVS SNAPSHOT
"              After Visit Summary   2017    Abhijeet Mccauley    MRN: [id number]           Patient Information     Date Of Birth          1960        Visit Information        Provider Department      2017 2:00 PM Ai Min [facility]        Today's Diagnoses     Sensorineural hearing loss, asymmetrical    -  1       Follow-ups after your visit        Who to contact     If you have questions or need follow up information about today's clinic visit or your schedule please contact [facility] directly at [phone].  Normal or non-critical lab and imaging results will be communicated to you by [employer], letter or phone within 4 business days after the clinic has received the results. If you do not hear from us within 7 days, please contact the clinic through [employer] or phone. If you have a critical or abnormal lab result, we will notify you by phone as soon as possible.  Submit refill requests through [employer] or call your pharmacy and they will forward the refill request to us. Please allow 3 business days for your refill to be completed.          Additional Information About Your Visit        MyChart Information     [employer] lets you send messages to your doctor, view your test results, renew your prescriptions, schedule appointments and more. To sign up, go to [email or web address] . Click on \"Log in\" on the left side of the screen, which will take you to the Welcome page. Then click on \"Sign up Now\" on the right side of the page.     You will be asked to enter the access code listed below, as well as some personal information. Please follow the directions to create your username and password.     Your access code is: [email or web address]  Expires: 2017  3:05 PM     Your access code will  in 90 days. If you need help or a new code, please call your [facility] or [phone].        Care EveryWhere ID     This is your Care EveryWhere ID. This could be used " by other organizations to access your [facility] medical records  [id number]         Blood Pressure from Last 3 Encounters:   06/26/17 129/66   02/13/17 116/74   12/21/16 117/53    Weight from Last 3 Encounters:   06/26/17 183 lb (83 kg)   02/13/17 175 lb (79.4 kg)   01/26/12 183 lb (83 kg)              We Performed the Following     HEARING AID CHECK, BINAURAL        Primary Care Provider    Unknown Primary MD Airam       No address on file        Equal Access to Services     [facility]: Hadii aad ku hadasho Soomaali, waaxda luqadaha, qaybta kaalmada adeegyada, waxay idiin hayaan adeeg mayuri chowdhury . So [facility] [phone].    ATENCIÓN: Si habla español, tiene a uriostegui disposición servicios gratuitos de asistencia lingüística. Llame al [phone].    We comply with applicable federal civil rights laws and [place] laws. We do not discriminate on the basis of race, color, national origin, age, disability sex, sexual orientation or gender identity.            Thank you!     Thank you for choosing [facility]  for your care. Our goal is always to provide you with excellent care. Hearing back from our patients is one way we can continue to improve our services. Please take a few minutes to complete the written survey that you may receive in the mail after your visit with us. Thank you!             Your Updated Medication List - Protect others around you: Learn how to safely use, store and throw away your medicines at www.disposemymeds.org.          This list is accurate as of: 6/29/17  2:08 PM.  Always use your most recent med list.                   Brand Name Dispense Instructions for use Diagnosis    acetaminophen 500 MG tablet    TYLENOL     Take 1-2 tablets by mouth every 6 hours as needed.        albuterol 90 MCG/ACT inhaler     1 Inhaler    Inhale 2 puffs into the lungs every 4 hours as needed for shortness of breath / dyspnea.        AMITRIPTYLINE HCL PO      Take 25 mg by mouth At Bedtime         amLODIPine 5 MG tablet    NORVASC    90 tablet    Take 1 tablet by mouth daily.    Unspecified essential hypertension       atenolol 50 MG tablet    TENORMIN    90 tablet    Take 1 tablet by mouth daily.    Unspecified essential hypertension       cephALEXin 500 MG capsule    KEFLEX    56 capsule    Take 2 capsules (1,000 mg) by mouth 4 times daily for 7 days        FLEXERIL PO           LASIX PO      Take 20 mg by mouth daily        lidocaine visc 2% & diphenhydramine 12.5mg/5mL & maalox/mylanta w/simethicone (1:1:1 v/v/v) Susp compounding kit     237 mL    Swish and swallow 5-10 mLs in mouth every 6 hours as needed        loratadine 10 MG tablet    CLARITIN    30 tablet    Take 1 tablet by mouth daily as needed for allergies.    Allergic rhinitis       naproxen sodium 220 MG tablet    ANAPROX     Take 220 mg by mouth 2 times daily (with meals).        PANTOPRAZOLE SODIUM PO      Take 40 mg by mouth every morning (before breakfast)        potassium chloride 20 MEQ Packet    KLOR-CON     Take 10 mEq by mouth 2 times daily        PROPRANOLOL HCL PO      Take 20 mg by mouth        SODIUM BICARBONATE PO      Take 650 mg by mouth daily as needed        TRAZODONE HCL PO      Take 50 mg by mouth At Bedtime        zolpidem 5 MG tablet    AMBIEN     Take 5 mg by mouth nightly as needed.

## 2017-07-11 ENCOUNTER — OFFICE VISIT (OUTPATIENT)
Dept: FAMILY MEDICINE | Facility: CLINIC | Age: 57
End: 2017-07-11
Payer: COMMERCIAL

## 2017-07-11 VITALS
BODY MASS INDEX: 30.99 KG/M2 | WEIGHT: 186 LBS | DIASTOLIC BLOOD PRESSURE: 63 MMHG | SYSTOLIC BLOOD PRESSURE: 107 MMHG | TEMPERATURE: 98.1 F | HEIGHT: 65 IN | HEART RATE: 77 BPM

## 2017-07-11 DIAGNOSIS — S81.801A OPEN WOUND OF LOWER LIMB, RIGHT, INITIAL ENCOUNTER: Primary | ICD-10-CM

## 2017-07-11 DIAGNOSIS — S20.219A CONTUSION OF RIB, UNSPECIFIED LATERALITY, INITIAL ENCOUNTER: ICD-10-CM

## 2017-07-11 PROCEDURE — 99214 OFFICE O/P EST MOD 30 MIN: CPT | Performed by: FAMILY MEDICINE

## 2017-07-11 RX ORDER — MAGNESIUM OXIDE 500 MG
1 TABLET ORAL AT BEDTIME
COMMUNITY
Start: 2017-07-11 | End: 2018-03-30

## 2017-07-11 NOTE — NURSING NOTE
"Chief Complaint   Patient presents with     ER F/U     Follow up from the ER on 6-12 and 6-26-17.       Initial /63  Pulse 77  Temp 98.1  F (36.7  C) (Oral)  Ht 5' 5\" (1.651 m)  Wt 186 lb (84.4 kg)  BMI 30.95 kg/m2 Estimated body mass index is 30.95 kg/(m^2) as calculated from the following:    Height as of this encounter: 5' 5\" (1.651 m).    Weight as of this encounter: 186 lb (84.4 kg).  Medication Reconciliation: complete  "

## 2017-07-11 NOTE — PATIENT INSTRUCTIONS
Thank you for choosing [facility].  You may be receiving a survey in the mail from Jadon Carter regarding your visit today.  Please take a few minutes to complete and return the survey to let us know how we are doing.      If you have questions or concerns, please contact us via [employer] or you can contact your care team at [phone].    Our Clinic hours are:  Monday 6:40 am  to 7:00 pm  Tuesday -Friday 6:40 am to 5:00 pm    The [place] outpatient lab hours are:  Monday - Friday 6:10 am to 4:45 pm  Saturdays 7:00 am to 11:00 am  Appointments are required, call [phone]    If you have clinical questions after hours or would like to schedule an appointment,  call the clinic at [phone].    (S21.519A) Open wound of lower limb, right, initial encounter  (primary encounter diagnosis)  Comment:   Plan: For the right lower leg wound monitor for signs of infection and if they occur then recheck right away in clinic or the Urgent Care.   Use the three layer dressing until this is dry. This is antibiotic ointment as in Bacitracin on the skin and then Telfa, the non stick dressing and cut this to size and then   The top layer is gauze. Use the paper tape and destick it once on the pants. Use different angles. This will take 2-3 weeks to heal and do not use hydrogen peroxide on this.   Clean this with only soap and water gently by dabbing. If doing well then follow up in clinic only as needed.     (S27.408A) Contusion of rib, unspecified laterality, initial encounter  Comment:   Plan: use advil and Tylenol as needed for pain. Avoid pepper and infection diseases. Use ice and modify activities.   This is going to take several weeks to heal. Take a deep breath slowly every 20 minutes when awake.
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
## 2017-07-11 NOTE — MR AVS SNAPSHOT
After Visit Summary   7/11/2017    Abhijeet Mccauley    MRN: [id number]           Patient Information     Date Of Birth          1960        Visit Information        Provider Department      7/11/2017 1:00 PM Parrish Funk MD [facility]        Today's Diagnoses     Open wound of lower limb, right, initial encounter    -  1    Contusion of rib, unspecified laterality, initial encounter          Care Instructions          Thank you for choosing [facility].  You may be receiving a survey in the mail from Jadon Carter regarding your visit today.  Please take a few minutes to complete and return the survey to let us know how we are doing.      If you have questions or concerns, please contact us via [email or web address] or you can contact your care team at [phone].    Our Clinic hours are:  Monday 6:40 am  to 7:00 pm  Tuesday -Friday 6:40 am to 5:00 pm    The [place] outpatient lab hours are:  Monday - Friday 6:10 am to 4:45 pm  Saturdays 7:00 am to 11:00 am  Appointments are required, call [phone]    If you have clinical questions after hours or would like to schedule an appointment,  call the clinic at [phone].    (G80.495A) Open wound of lower limb, right, initial encounter  (primary encounter diagnosis)  Comment:   Plan: For the right lower leg wound monitor for signs of infection and if they occur then recheck right away in clinic or the Urgent Care.   Use the three layer dressing until this is dry. This is antibiotic ointment as in Bacitracin on the skin and then Telfa, the non stick dressing and cut this to size and then   The top layer is gauze. Use the paper tape and destick it once on the pants. Use different angles. This will take 2-3 weeks to heal and do not use hydrogen peroxide on this.   Clean this with only soap and water gently by dabbing. If doing well then follow up in clinic only as needed.     (S20.219A) Contusion of rib, unspecified laterality, initial  "encounter  Comment:   Plan: use advil and Tylenol as needed for pain. Avoid pepper and infection diseases. Use ice and modify activities.   This is going to take several weeks to heal. Take a deep breath slowly every 20 minutes when awake.             Follow-ups after your visit        Who to contact     If you have questions or need follow up information about today's clinic visit or your schedule please contact [facility] directly at [phone].  Normal or non-critical lab and imaging results will be communicated to you by Purple Blue Bohart, letter or phone within 4 business days after the clinic has received the results. If you do not hear from us within 7 days, please contact the clinic through AlphaSmartt or phone. If you have a critical or abnormal lab result, we will notify you by phone as soon as possible.  Submit refill requests through [email or web address] or call your pharmacy and they will forward the refill request to us. Please allow 3 business days for your refill to be completed.          Additional Information About Your Visit        [email or web address] Information     [email or web address] lets you send messages to your doctor, view your test results, renew your prescriptions, schedule appointments and more. To sign up, go to [email or web address] . Click on \"Log in\" on the left side of the screen, which will take you to the Welcome page. Then click on \"Sign up Now\" on the right side of the page.     You will be asked to enter the access code listed below, as well as some personal information. Please follow the directions to create your username and password.     Your access code is: [email or web address]  Expires: 2017  3:05 PM     Your access code will  in 90 days. If you need help or a new code, please call your [facility] or [phone].        Care EveryWhere ID     This is your Care EveryWhere ID. This could be used by other organizations to access your [place] medical records  [id number]        Your Vitals Were     " "Pulse Temperature Height BMI (Body Mass Index)          77 98.1  F (36.7  C) (Oral) 5' 5\" (1.651 m) 30.95 kg/m2         Blood Pressure from Last 3 Encounters:   07/11/17 107/63   06/26/17 129/66   02/13/17 116/74    Weight from Last 3 Encounters:   07/11/17 186 lb (84.4 kg)   06/26/17 183 lb (83 kg)   02/13/17 175 lb (79.4 kg)              Today, you had the following     No orders found for display       Primary Care Provider    Unknown Primary MD Airam       No address on file        Equal Access to Services     [facility]: Hadii sameera Huddleston, maritza underwood, randy kaalmamilo fournier, song chowdhury . So [facility] [place] [phone].    ATENCIÓN: Si habla español, tiene a uriostegui disposición servicios gratuitos de asistencia lingüística. Llame al [phone].    We comply with applicable federal civil rights laws and [place] laws. We do not discriminate on the basis of race, color, national origin, age, disability sex, sexual orientation or gender identity.            Thank you!     Thank you for choosing [facility]  for your care. Our goal is always to provide you with excellent care. Hearing back from our patients is one way we can continue to improve our services. Please take a few minutes to complete the written survey that you may receive in the mail after your visit with us. Thank you!             Your Updated Medication List - Protect others around you: Learn how to safely use, store and throw away your medicines at www.disposemymeds.org.          This list is accurate as of: 7/11/17  1:49 PM.  Always use your most recent med list.                   Brand Name Dispense Instructions for use Diagnosis    acetaminophen 500 MG tablet    TYLENOL     Take 1-2 tablets by mouth every 6 hours as needed.        AMITRIPTYLINE HCL PO      Take 25 mg by mouth At Bedtime        amLODIPine 5 MG tablet    NORVASC    90 tablet    Take 1 tablet by mouth daily.    Unspecified essential " hypertension       atenolol 50 MG tablet    TENORMIN    90 tablet    Take 1 tablet by mouth daily.    Unspecified essential hypertension       FLEXERIL PO           LASIX PO      Take 20 mg by mouth daily        lidocaine visc 2% & diphenhydramine 12.5mg/5mL & maalox/mylanta w/simethicone (1:1:1 v/v/v) Susp compounding kit     237 mL    Swish and swallow 5-10 mLs in mouth every 6 hours as needed        loratadine 10 MG tablet    CLARITIN    30 tablet    Take 1 tablet by mouth daily as needed for allergies.    Allergic rhinitis       Melatonin ER 5 MG Tbcr      Take 1 tablet by mouth At Bedtime        naproxen sodium 220 MG tablet    ANAPROX     Take 220 mg by mouth 2 times daily (with meals).        NEW MED      Taking numerous vitamins daily.        PANTOPRAZOLE SODIUM PO      Take 40 mg by mouth every morning (before breakfast)        potassium chloride 20 MEQ Packet    KLOR-CON     Take 10 mEq by mouth 2 times daily        PROPRANOLOL HCL PO      Take 20 mg by mouth        SODIUM BICARBONATE PO      Take 650 mg by mouth daily as needed        TRAZODONE HCL PO      Take 50 mg by mouth At Bedtime        zolpidem 5 MG tablet    AMBIEN     Take 5 mg by mouth nightly as needed.

## 2017-08-16 ENCOUNTER — OFFICE VISIT (OUTPATIENT)
Dept: FAMILY MEDICINE | Facility: CLINIC | Age: 57
End: 2017-08-16
Payer: COMMERCIAL

## 2017-08-16 VITALS
SYSTOLIC BLOOD PRESSURE: 127 MMHG | TEMPERATURE: 98.1 F | DIASTOLIC BLOOD PRESSURE: 69 MMHG | BODY MASS INDEX: 31.46 KG/M2 | HEART RATE: 82 BPM | WEIGHT: 188.8 LBS | RESPIRATION RATE: 16 BRPM | OXYGEN SATURATION: 97 % | HEIGHT: 65 IN

## 2017-08-16 DIAGNOSIS — S81.801S OPEN WOUND OF LOWER LIMB, RIGHT, SEQUELA: Primary | ICD-10-CM

## 2017-08-16 DIAGNOSIS — I87.2 VENOUS (PERIPHERAL) INSUFFICIENCY: ICD-10-CM

## 2017-08-16 DIAGNOSIS — L71.9 ROSACEA: ICD-10-CM

## 2017-08-16 PROCEDURE — 99213 OFFICE O/P EST LOW 20 MIN: CPT | Performed by: NURSE PRACTITIONER

## 2017-08-16 RX ORDER — DESONIDE 0.5 MG/ML
LOTION TOPICAL PRN
Qty: 118 ML | Refills: 1 | Status: SHIPPED | OUTPATIENT
Start: 2017-08-16 | End: 2018-12-01

## 2017-08-16 RX ORDER — DESONIDE 0.5 MG/ML
LOTION TOPICAL PRN
COMMUNITY
End: 2017-08-16

## 2017-08-16 RX ORDER — DESONIDE 0.5 MG/ML
LOTION TOPICAL PRN
Status: CANCELLED | OUTPATIENT
Start: 2017-08-16
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
## 2017-08-16 NOTE — PROGRESS NOTES
SUBJECTIVE:                                                    Abhijeet Mccauely is a 56 year old male who presents to clinic today for the following health issues:    Has been seen here x 3 over last 2 months for open wound on right shin that is not healing. States he has been dressing with bacitracin and guaze. Pain is becoming worse. Hx of Cirrhosis, elevated creatinine. No known cardiac hx. Primary provider is at [facility] in [place], he would like to transfer care up here to Dr. Funk.       Follow up on Laceration      Duration: 2 months    Description (location/character/radiation): right shin    Intensity:  Severe, worsening    Accompanying signs and symptoms: redness, warmth, open draining sore, pain is a 10/10, hard to walk on it at times.    History (similar episodes/previous evaluation): None    Precipitating or alleviating factors: None, cut with a wine bottle    Therapies tried and outcome: cephalexin in June, since then dressing it with bacitracin-washing with soap and water. States that he smokes marijuana for pain.       Problem list and histories reviewed & adjusted, as indicated.  Additional history: as documented    Patient Active Problem List   Diagnosis     Essential hypertension     Acute myeloid leukemia in remission (H)     Sensorineural hearing loss, asymmetrical     Alcoholic cirrhosis of liver (H)     Acute alcoholic hepatitis     Coagulation defect (H)     Osteoarthrosis     Hemorrhage of gastrointestinal tract     Thrombocytopenia (H)     Universal ulcerative colitis (H)     CKD (chronic kidney disease) stage 3, GFR 30-59 ml/min     History reviewed. No pertinent surgical history.    Social History   Substance Use Topics     Smoking status: Current Every Day Smoker     Packs/day: 0.50     Years: 30.00     Types: Cigarettes     Smokeless tobacco: Never Used      Comment: declined quit plan     Alcohol use Yes      Comment: 6 pack a day     Family History  "  Problem Relation Age of Onset     Hypertension Mother      Coronary Artery Disease Father      MI             Reviewed and updated as needed this visit by clinical staff     Reviewed and updated as needed this visit by Provider         ROS:  Constitutional, HEENT, cardiovascular, pulmonary, gi and gu systems are negative, except as otherwise noted.      OBJECTIVE:   /69 (BP Location: Right arm, Patient Position: Sitting, Cuff Size: Adult Regular)  Pulse 82  Temp 98.1  F (36.7  C) (Tympanic)  Resp 16  Ht 5' 5\" (1.651 m)  Wt 188 lb 12.8 oz (85.6 kg)  SpO2 97%  BMI 31.42 kg/m2  Body mass index is 31.42 kg/(m^2).  GENERAL: healthy, alert and no distress  MS: +1 pitting edema to BLE with dusky appearance and tenderness, no warmth. Negative Melisa's bilateral.   SKIN: Open wound to right shin with overlying granulation tissue, pink around edges, no erythema or warmth. Micro varicosities to bilateral cheeks with redness, patient requesting refill of desonide cream he has with him.    Diagnostic Test Results:  none     ASSESSMENT/PLAN:       ICD-10-CM    1. Open wound of lower limb, right, sequela S81.801S WOUND CARE REFERRAL   2. Venous (peripheral) insufficiency I87.2    3. Rosacea L71.9 desonide (DESOWEN) 0.05 % lotion       CONSULTATION/REFERRAL to WOUND CARE REFERRAL    FUTURE APPOINTMENTS:       - Follow-up visit in the next 2 weeks with Dr. Funk to establish care and wound f/u. GITA signed by patient and faxed to previous clinic for records. No signs of infection today I suspect the delayed wound healing is from the edema in his legs secondary to cirrhosis, ckd, and possibly venous insufficiency.     Patient Instructions     Leg Swelling in Both Legs    Swelling of the feet, ankles, and legs is called edema. It is caused by excess fluid that has collected in the tissues. Extra fluid in the body settles in the lowest part because of gravity. This is why the legs and feet are most affected.  Some of " the causes for edema include:    Disease of the heart like congestive heart failure    Standing or sitting for long periods of time    Infection of the feet or legs    Blood pooling in the veins of your legs (venous insufficiency)    Dilated veins in your lower leg (varicose veins)    Garters or other clothing that is tight on your legs. This will cause blood to pool in your legs because the clothing limits blood flow.    Some medicines such as hormones like birth control pills, some blood pressure medicines like calcium channel blockers (amlodipine) and steroids, some antidepressants like MAO inhibitors and tricyclics    Menstrual periods that cause you to retain fluids    Many types of renal disease    Liver failure or cirrhosis    Pregnancy, some swelling is normal, but a sudden increase in leg swelling or weight gain can be a sign of a dangerous complication of pregnancy    Poor nutrition    Thyroid disease  Medical treatment will depend on what is causing the swelling in your legs. Your healthcare provider may prescribe water pills (diuretics) to get rid of the extra fluid.  Home care  Follow these guidelines when caring for yourself at home:    Don't wear clothing like garters that is tight on your legs.    Keep your legs up while lying or sitting.    If infection, injury, or recent surgery is causing the swelling, stay off your legs as much as possible until symptoms get better.    If your healthcare provider says that your leg swelling is caused by venous insufficiency or varicose veins, don't sit or  one place for long periods of time. Take breaks and walk about every few hours. Brisk walking is a good exercise. It helps circulate the blood that has collected in your leg. Talk with your provider about using support stockings to stop daytime leg swelling.    If your provider says that heart disease is causing your leg swelling, follow a low-salt diet to stop extra fluid from staying in your body. You  may also need medicine.  Follow-up care  Follow up with your healthcare provider, or as advised.  When to seek medical advice  Call your healthcare provider right away if any of these occur:    New shortness of breath or chest pain    Shortness of breath or chest pain that gets worse    Swelling in both legs or ankles that gets worse    Swelling of the abdomen    Redness, warmth, or swelling in one leg    Fever of 100.4 F (38 C) or higher, or as directed by your healthcare provider    Yellow color to your skin or eyes    Rapid, unexplained weight gain    Having to sleep upright or use an increased number of pillows  Date Last Reviewed: 3/31/2016    1044-1042 [employer]. [address] [place] [address], [place] [address]. All rights reserved. This information is not intended as a substitute for professional medical care. Always follow your healthcare professional's instructions.            LOPEZ Arreola [facility]

## 2017-08-16 NOTE — PATIENT INSTRUCTIONS
Leg Swelling in Both Legs    Swelling of the feet, ankles, and legs is called edema. It is caused by excess fluid that has collected in the tissues. Extra fluid in the body settles in the lowest part because of gravity. This is why the legs and feet are most affected.  Some of the causes for edema include:    Disease of the heart like congestive heart failure    Standing or sitting for long periods of time    Infection of the feet or legs    Blood pooling in the veins of your legs (venous insufficiency)    Dilated veins in your lower leg (varicose veins)    Garters or other clothing that is tight on your legs. This will cause blood to pool in your legs because the clothing limits blood flow.    Some medicines such as hormones like birth control pills, some blood pressure medicines like calcium channel blockers (amlodipine) and steroids, some antidepressants like MAO inhibitors and tricyclics    Menstrual periods that cause you to retain fluids    Many types of renal disease    Liver failure or cirrhosis    Pregnancy, some swelling is normal, but a sudden increase in leg swelling or weight gain can be a sign of a dangerous complication of pregnancy    Poor nutrition    Thyroid disease  Medical treatment will depend on what is causing the swelling in your legs. Your healthcare provider may prescribe water pills (diuretics) to get rid of the extra fluid.  Home care  Follow these guidelines when caring for yourself at home:    Don't wear clothing like garters that is tight on your legs.    Keep your legs up while lying or sitting.    If infection, injury, or recent surgery is causing the swelling, stay off your legs as much as possible until symptoms get better.    If your healthcare provider says that your leg swelling is caused by venous insufficiency or varicose veins, don't sit or  one place for long periods of time. Take breaks and walk about every few hours. Brisk walking is a good exercise. It helps  circulate the blood that has collected in your leg. Talk with your provider about using support stockings to stop daytime leg swelling.    If your provider says that heart disease is causing your leg swelling, follow a low-salt diet to stop extra fluid from staying in your body. You may also need medicine.  Follow-up care  Follow up with your healthcare provider, or as advised.  When to seek medical advice  Call your healthcare provider right away if any of these occur:    New shortness of breath or chest pain    Shortness of breath or chest pain that gets worse    Swelling in both legs or ankles that gets worse    Swelling of the abdomen    Redness, warmth, or swelling in one leg    Fever of 100.4 F (38 C) or higher, or as directed by your healthcare provider    Yellow color to your skin or eyes    Rapid, unexplained weight gain    Having to sleep upright or use an increased number of pillows  Date Last Reviewed: 3/31/2016    2999-5870 The "[employer]". [address] [place] [address], [place] [address]. All rights reserved. This information is not intended as a substitute for professional medical care. Always follow your healthcare professional's instructions.

## 2017-08-16 NOTE — NURSING NOTE
"Chief Complaint   Patient presents with     Laceration       Initial /69 (BP Location: Right arm, Patient Position: Sitting, Cuff Size: Adult Regular)  Pulse 82  Temp 98.1  F (36.7  C) (Tympanic)  Resp 16  Ht 5' 5\" (1.651 m)  Wt 188 lb 12.8 oz (85.6 kg)  SpO2 97%  BMI 31.42 kg/m2 Estimated body mass index is 31.42 kg/(m^2) as calculated from the following:    Height as of this encounter: 5' 5\" (1.651 m).    Weight as of this encounter: 188 lb 12.8 oz (85.6 kg).  Medication Reconciliation: complete  "

## 2017-10-02 ENCOUNTER — TELEPHONE (OUTPATIENT)
Dept: AUDIOLOGY | Facility: CLINIC | Age: 57
End: 2017-10-02

## 2017-10-02 ENCOUNTER — OFFICE VISIT (OUTPATIENT)
Dept: AUDIOLOGY | Facility: CLINIC | Age: 57
End: 2017-10-02
Payer: COMMERCIAL

## 2017-10-02 DIAGNOSIS — H90.3 SENSORINEURAL HEARING LOSS, ASYMMETRICAL: Primary | ICD-10-CM

## 2017-10-02 PROCEDURE — 99207 ZZC NO CHARGE LOS: CPT | Performed by: AUDIOLOGIST

## 2017-10-02 PROCEDURE — V5266 BATTERY FOR HEARING DEVICE: HCPCS | Performed by: AUDIOLOGIST

## 2017-10-02 NOTE — MR AVS SNAPSHOT
"              After Visit Summary   10/2/2017    Abhijeet Mccauley    MRN: [id number]           Patient Information     Date Of Birth          1960        Visit Information        Provider Department      10/2/2017 3:00 PM Ai Min [facility]        Today's Diagnoses     Sensorineural hearing loss, asymmetrical    -  1       Follow-ups after your visit        Who to contact     If you have questions or need follow up information about today's clinic visit or your schedule please contact [facility] directly at [phone].  Normal or non-critical lab and imaging results will be communicated to you by [employer], letter or phone within 4 business days after the clinic has received the results. If you do not hear from us within 7 days, please contact the clinic through [employer] or phone. If you have a critical or abnormal lab result, we will notify you by phone as soon as possible.  Submit refill requests through iiyuma or call your pharmacy and they will forward the refill request to us. Please allow 3 business days for your refill to be completed.          Additional Information About Your Visit        MyChart Information     iiyuma lets you send messages to your doctor, view your test results, renew your prescriptions, schedule appointments and more. To sign up, go to [email or web address] . Click on \"Log in\" on the left side of the screen, which will take you to the Welcome page. Then click on \"Sign up Now\" on the right side of the page.     You will be asked to enter the access code listed below, as well as some personal information. Please follow the directions to create your username and password.     Your access code is: [email or web address]  Expires: 2017  3:55 PM     Your access code will  in 90 days. If you need help or a new code, please call your [facility] or [phone].        Care EveryWhere ID     This is your Care EveryWhere ID. This could be " used by other organizations to access your [facility] medical records  [id number]         Blood Pressure from Last 3 Encounters:   08/16/17 127/69   07/11/17 107/63   06/26/17 129/66    Weight from Last 3 Encounters:   08/16/17 188 lb 12.8 oz (85.6 kg)   07/11/17 186 lb (84.4 kg)   06/26/17 183 lb (83 kg)              We Performed the Following     HC HEARING DEVICE BATTERY        Primary Care Provider    None Specified       No primary provider on file.        Equal Access to Services     JORGE COOL : Hadii aad ku hadasho Soomaali, waaxda luqadaha, qaybta kaalmada adeegyamilo, song chowdhury . So [facility] [phone].    ATENCIÓN: Si habla español, tiene a uriostegui disposición servicios gratuitos de asistencia lingüística. Llame al [phone].    We comply with applicable federal civil rights laws and [place] laws. We do not discriminate on the basis of race, color, national origin, age, disability, sex, sexual orientation, or gender identity.            Thank you!     Thank you for choosing [facility]  for your care. Our goal is always to provide you with excellent care. Hearing back from our patients is one way we can continue to improve our services. Please take a few minutes to complete the written survey that you may receive in the mail after your visit with us. Thank you!             Your Updated Medication List - Protect others around you: Learn how to safely use, store and throw away your medicines at www.disposemymeds.org.          This list is accurate as of: 10/2/17  3:55 PM.  Always use your most recent med list.                   Brand Name Dispense Instructions for use Diagnosis    acetaminophen 500 MG tablet    TYLENOL     Take 1-2 tablets by mouth every 6 hours as needed.        AMITRIPTYLINE HCL PO      Take 25 mg by mouth At Bedtime        amLODIPine 5 MG tablet    NORVASC    90 tablet    Take 1 tablet by mouth daily.    Unspecified essential hypertension        atenolol 50 MG tablet    TENORMIN    90 tablet    Take 1 tablet by mouth daily.    Unspecified essential hypertension       desonide 0.05 % lotion    DESOWEN    118 mL    Apply topically as needed    Rosacea       FIRST-MOUTHWASH BLM MT Susp compounding kit     237 mL    Swish and swallow 5-10 mLs in mouth every 6 hours as needed        FLEXERIL PO           LASIX PO      Take 20 mg by mouth daily        loratadine 10 MG tablet    CLARITIN    30 tablet    Take 1 tablet by mouth daily as needed for allergies.    Allergic rhinitis       Melatonin ER 5 MG Tbcr      Take 1 tablet by mouth At Bedtime        naproxen sodium 220 MG tablet    ANAPROX     Take 220 mg by mouth 2 times daily (with meals).        NEW MED      Taking numerous vitamins daily.        PANTOPRAZOLE SODIUM PO      Take 40 mg by mouth every morning (before breakfast)        potassium chloride 20 MEQ Packet    KLOR-CON     Take 10 mEq by mouth 2 times daily        PROPRANOLOL HCL PO      Take 20 mg by mouth        SODIUM BICARBONATE PO      Take 650 mg by mouth daily as needed        TRAZODONE HCL PO      Take 50 mg by mouth At Bedtime        zolpidem 5 MG tablet    AMBIEN     Take 5 mg by mouth nightly as needed.

## 2017-10-02 NOTE — PROGRESS NOTES
56 year old male requests hearing aid batteries. He is currently wearing 2014 [employer] [id number] hearing aids that were purchased at another facility.     Verified date of last battery dispensing and size needed.    Will  30 size 312 hearing aid batteries at the specialty clinic .     See chart in the hearing aid room.     Ai KUNZ, #[id number]

## 2017-10-30 ENCOUNTER — TELEPHONE (OUTPATIENT)
Dept: FAMILY MEDICINE | Facility: CLINIC | Age: 57
End: 2017-10-30

## 2017-11-13 ENCOUNTER — OFFICE VISIT (OUTPATIENT)
Dept: FAMILY MEDICINE | Facility: CLINIC | Age: 57
End: 2017-11-13
Payer: COMMERCIAL

## 2017-11-13 VITALS
DIASTOLIC BLOOD PRESSURE: 53 MMHG | HEIGHT: 65 IN | TEMPERATURE: 97.3 F | WEIGHT: 195 LBS | SYSTOLIC BLOOD PRESSURE: 107 MMHG | HEART RATE: 75 BPM | BODY MASS INDEX: 32.49 KG/M2

## 2017-11-13 DIAGNOSIS — K21.9 GASTROESOPHAGEAL REFLUX DISEASE WITHOUT ESOPHAGITIS: Primary | ICD-10-CM

## 2017-11-13 DIAGNOSIS — K70.10 ACUTE ALCOHOLIC HEPATITIS (H): ICD-10-CM

## 2017-11-13 DIAGNOSIS — J45.20 MILD INTERMITTENT ASTHMA WITHOUT COMPLICATION: ICD-10-CM

## 2017-11-13 DIAGNOSIS — Z23 NEED FOR VACCINATION: ICD-10-CM

## 2017-11-13 DIAGNOSIS — R06.02 SHORTNESS OF BREATH: ICD-10-CM

## 2017-11-13 DIAGNOSIS — N18.30 CKD (CHRONIC KIDNEY DISEASE) STAGE 3, GFR 30-59 ML/MIN (H): ICD-10-CM

## 2017-11-13 LAB
ALBUMIN SERPL-MCNC: 3.5 G/DL (ref 3.4–5)
ALP SERPL-CCNC: 241 U/L (ref 40–150)
ALT SERPL W P-5'-P-CCNC: 24 U/L (ref 0–70)
ANION GAP SERPL CALCULATED.3IONS-SCNC: 13 MMOL/L (ref 3–14)
AST SERPL W P-5'-P-CCNC: 34 U/L (ref 0–45)
BILIRUB SERPL-MCNC: 1.2 MG/DL (ref 0.2–1.3)
BUN SERPL-MCNC: 27 MG/DL (ref 7–30)
CALCIUM SERPL-MCNC: 8.7 MG/DL (ref 8.5–10.1)
CHLORIDE SERPL-SCNC: 107 MMOL/L (ref 94–109)
CO2 SERPL-SCNC: 18 MMOL/L (ref 20–32)
CREAT SERPL-MCNC: 2.67 MG/DL (ref 0.66–1.25)
GFR SERPL CREATININE-BSD FRML MDRD: 25 ML/MIN/1.7M2
GLUCOSE SERPL-MCNC: 76 MG/DL (ref 70–99)
NT-PROBNP SERPL-MCNC: 294 PG/ML (ref 0–125)
POTASSIUM SERPL-SCNC: 4.1 MMOL/L (ref 3.4–5.3)
PROT SERPL-MCNC: 7.4 G/DL (ref 6.8–8.8)
SODIUM SERPL-SCNC: 138 MMOL/L (ref 133–144)

## 2017-11-13 PROCEDURE — 83880 ASSAY OF NATRIURETIC PEPTIDE: CPT | Performed by: FAMILY MEDICINE

## 2017-11-13 PROCEDURE — 90471 IMMUNIZATION ADMIN: CPT | Performed by: FAMILY MEDICINE

## 2017-11-13 PROCEDURE — 90670 PCV13 VACCINE IM: CPT | Performed by: FAMILY MEDICINE

## 2017-11-13 PROCEDURE — 99214 OFFICE O/P EST MOD 30 MIN: CPT | Mod: 25 | Performed by: FAMILY MEDICINE

## 2017-11-13 PROCEDURE — 80053 COMPREHEN METABOLIC PANEL: CPT | Performed by: FAMILY MEDICINE

## 2017-11-13 PROCEDURE — 36415 COLL VENOUS BLD VENIPUNCTURE: CPT | Performed by: FAMILY MEDICINE

## 2017-11-13 RX ORDER — ALBUTEROL SULFATE 90 UG/1
2 AEROSOL, METERED RESPIRATORY (INHALATION) EVERY 4 HOURS PRN
Qty: 1 INHALER | Refills: 11 | Status: SHIPPED | OUTPATIENT
Start: 2017-11-13 | End: 2019-02-07

## 2017-11-13 RX ORDER — POTASSIUM CHLORIDE 750 MG/1
10 TABLET, EXTENDED RELEASE ORAL DAILY
COMMUNITY
Start: 2017-11-13 | End: 2018-03-30

## 2017-11-13 RX ORDER — FUROSEMIDE 20 MG
20 TABLET ORAL 2 TIMES DAILY
COMMUNITY
Start: 2017-11-13 | End: 2018-01-24

## 2017-11-13 RX ORDER — PANTOPRAZOLE SODIUM 40 MG/1
40 FOR SUSPENSION ORAL
Qty: 30 PACKET | Refills: 11 | Status: SHIPPED | OUTPATIENT
Start: 2017-11-13 | End: 2018-03-16

## 2017-11-13 RX ORDER — PROPRANOLOL HYDROCHLORIDE 20 MG/1
20 TABLET ORAL 2 TIMES DAILY
COMMUNITY
Start: 2017-11-13 | End: 2018-02-05

## 2017-11-13 NOTE — NURSING NOTE
"Chief Complaint   Patient presents with     Hypertension     Here to discuss about his blood pressure.     Kidney Problem     Here to discuss about his kidney.     Establish Care     Trying to establish more care in this location.     Pain     Discuss about an MRI as he is having pain radiating down his legs.       Initial /53  Pulse 75  Temp 97.3  F (36.3  C) (Tympanic)  Ht 5' 5\" (1.651 m)  Wt 195 lb (88.5 kg)  BMI 32.45 kg/m2 Estimated body mass index is 32.45 kg/(m^2) as calculated from the following:    Height as of this encounter: 5' 5\" (1.651 m).    Weight as of this encounter: 195 lb (88.5 kg).  Medication Reconciliation: complete  "

## 2017-11-13 NOTE — PROGRESS NOTES
SUBJECTIVE:   Abhijeet Mccauley is a 56 year old male who presents to clinic today for the following health issues:      ESTABLISH CARE:  Trying to establish care up here.    Hypertension Follow-up  Amlodipine and Atenolol are on his medication list from 2012.  He states he is not on any blood pressure medications.      Outpatient blood pressures are not being checked.    Low Salt Diet: no added salt      KIDNEY:  Recheck on his kidney.  He states he is drinking more alcohol due to the pain he is having.  He is having about 6-7 beers per day.  Sometime a cocktail also.    He states he is having the alcohol to help with the pain for the right leg, back and neck pain.  He states he is not prescribed any pain medication due to his kidney problem.  He would like to discuss about having an MRI done.    SHORTNESS OF BREATH:  He has noticed shortness of breath with weight gain.           Amount of exercise or physical activity: Walking in the woods for hunting.    Problems taking medications regularly: No    Medication side effects: none    Diet: No added salt.      Current Outpatient Prescriptions:      propranolol (INDERAL) 20 MG tablet, Take 1 tablet (20 mg) by mouth 2 times daily, Disp: , Rfl:      potassium chloride SA (K-DUR/KLOR-CON M) 10 MEQ CR tablet, Take 1 tablet (10 mEq) by mouth daily, Disp: , Rfl:      furosemide (LASIX) 20 MG tablet, Take 1 tablet (20 mg) by mouth 2 times daily, Disp: , Rfl:      pantoprazole sodium (PROTONIX) 40 MG packet, Take 1 packet (40 mg) by mouth every morning (before breakfast), Disp: 30 packet, Rfl: 11     albuterol (PROAIR HFA/PROVENTIL HFA/VENTOLIN HFA) 108 (90 BASE) MCG/ACT Inhaler, Inhale 2 puffs into the lungs every 4 hours as needed, Disp: 1 Inhaler, Rfl: 11     desonide (DESOWEN) 0.05 % lotion, Apply topically as needed, Disp: 118 mL, Rfl: 1     Melatonin ER 5 MG TBCR, Take 1 tablet by mouth At Bedtime, Disp: , Rfl:      TRAZODONE HCL PO, Take 50 mg by mouth At Bedtime ,  "Disp: , Rfl:      loratadine (CLARITIN) 10 MG tablet, Take 1 tablet by mouth daily as needed for allergies., Disp: 30 tablet, Rfl: 6     acetaminophen (TYLENOL) 500 MG tablet, Take 1-2 tablets by mouth every 6 hours as needed., Disp: , Rfl:      naproxen sodium (ANAPROX) 220 MG tablet, Take 220 mg by mouth 2 times daily (with meals)., Disp: , Rfl:      SODIUM BICARBONATE PO, Take 650 mg by mouth daily as needed , Disp: , Rfl:      [DISCONTINUED] Furosemide (LASIX PO), Take 20 mg by mouth daily , Disp: , Rfl:      [DISCONTINUED] PROPRANOLOL HCL PO, Take 20 mg by mouth, Disp: , Rfl:     Patient Active Problem List   Diagnosis     Essential hypertension     Acute myeloid leukemia in remission (H)     Sensorineural hearing loss, asymmetrical     Alcoholic cirrhosis of liver (H)     Acute alcoholic hepatitis     Coagulation defect (H)     Osteoarthrosis     Hemorrhage of gastrointestinal tract     Thrombocytopenia (H)     Universal ulcerative (chronic) colitis(556.6) (H)     CKD (chronic kidney disease) stage 3, GFR 30-59 ml/min     Rosacea     Mild intermittent asthma without complication       Blood pressure 107/53, pulse 75, temperature 97.3  F (36.3  C), temperature source Tympanic, height 5' 5\" (1.651 m), weight 195 lb (88.5 kg).    Exam:  GENERAL APPEARANCE: healthy, alert and no distress  GENERAL APPEARANCE: over weight  EYES: EOMI,  PERRL  HENT: ear canals and TM's normal and nose and mouth without ulcers or lesions  RESP: lungs clear to auscultation - no rales, rhonchi or wheezes  CV: regular rates and rhythm, normal S1 S2, no S3 or S4 and no murmur, click or rub -  ABDOMEN:  soft, nontender, no masses and bowel sounds normal  ABDOMEN: overweight but not tender; liver is enlarged  SKIN: no suspicious lesions or rashes  PSYCH: mentation appears normal and affect normal/bright      (K21.9) Gastroesophageal reflux disease without esophagitis  (primary encounter diagnosis)  Comment:   Plan: propranolol (INDERAL) 20 " MG tablet, potassium         chloride SA (K-DUR/KLOR-CON M) 10 MEQ CR         tablet, furosemide (LASIX) 20 MG tablet,         pantoprazole sodium (PROTONIX) 40 MG packet        We discussed the medications and refilled Pantoprazole for one year. Use this and the blander diet and the goal for the symptoms of reflux is to be zero.   If doing well then refill and recheck annually.     (N18.3) CKD (chronic kidney disease) stage 3, GFR 30-59 ml/min  Comment:   Plan: Comprehensive metabolic panel, NEPHROLOGY ADULT        REFERRAL        For the kidney we will order the creatinine, or the lab for the kidney function. Do the lab today and we will call the results.   The referral to our Nephrologist, Dr. Lewis, is done today. Call for that appt.     (K70.10) Acute alcoholic hepatitis  Comment:   Plan: Comprehensive metabolic panel        For the liver, we discussed lowering the amount of alcohol and eventually stopping. He has gone through CD treatment.   We discussed aftercare and having a sponsor. We discussed the issues related in chronic liver disease.     (R06.02) Shortness of breath  Comment:   Plan: BNP-N terminal pro        We refilled the Albuterol inhaler for one year. Use as directed, 2 puffs  by one minute. Use every 3-4 hours as needed.   Identify triggers to avoid and use the inhaler before unavoidable triggers.     (J45.20) Mild intermittent asthma without complication  Comment:   Plan: albuterol (PROAIR HFA/PROVENTIL HFA/VENTOLIN         HFA) 108 (90 BASE) MCG/ACT Inhaler        We refilled the Albuterol inhaler for one year. Use as directed, 2 puffs  by one minute. Use every 3-4 hours as needed.   Identify triggers to avoid and use the inhaler before unavoidable triggers.     For the chronic pain he has been through the pain management program.       Parrish Funk

## 2017-11-13 NOTE — PATIENT INSTRUCTIONS
Thank you for choosing [facility].  You may be receiving a survey in the mail from Jadon Carter regarding your visit today.  Please take a few minutes to complete and return the survey to let us know how we are doing.      If you have questions or concerns, please contact us via Wattbot or you can contact your care team at [phone].    Our Clinic hours are:  Monday 6:40 am  to 7:00 pm  Tuesday -Friday 6:40 am to 5:00 pm    The [place] outpatient lab hours are:  Monday - Friday 6:10 am to 4:45 pm  Saturdays 7:00 am to 11:00 am  Appointments are required, call [phone]    If you have clinical questions after hours or would like to schedule an appointment,  call the clinic at [phone].    (K21.9) Gastroesophageal reflux disease without esophagitis  (primary encounter diagnosis)  Comment:   Plan: propranolol (INDERAL) 20 MG tablet, potassium         chloride SA (K-DUR/KLOR-CON M) 10 MEQ CR         tablet, furosemide (LASIX) 20 MG tablet,         pantoprazole sodium (PROTONIX) 40 MG packet        We discussed the medications and refilled Pantoprazole for one year. Use this and the blander diet and the goal for the symptoms of reflux is to be zero.   If doing well then refill and recheck annually.     (N18.3) CKD (chronic kidney disease) stage 3, GFR 30-59 ml/min  Comment:   Plan: Comprehensive metabolic panel, NEPHROLOGY ADULT        REFERRAL        For the kidney we will order the creatinine, or the lab for the kidney function. Do the lab today and we will call the results.   The referral to our Nephrologist, Dr. Lewis, is done today. Call for that appt.     (K70.10) Acute alcoholic hepatitis  Comment:   Plan: Comprehensive metabolic panel        For the liver, we discussed lowering the amount of alcohol and eventually stopping. He has gone through CD treatment.   We discussed aftercare and having a sponsor. We discussed the issues related in chronic liver disease.     (R06.02) Shortness of  breath  Comment:   Plan: BNP-N terminal pro        We refilled the Albuterol inhaler for one year. Use as directed, 2 puffs  by one minute. Use every 3-4 hours as needed.   Identify triggers to avoid and use the inhaler before unavoidable triggers.     (J45.20) Mild intermittent asthma without complication  Comment:   Plan: albuterol (PROAIR HFA/PROVENTIL HFA/VENTOLIN         HFA) 108 (90 BASE) MCG/ACT Inhaler        We refilled the Albuterol inhaler for one year. Use as directed, 2 puffs  by one minute. Use every 3-4 hours as needed.   Identify triggers to avoid and use the inhaler before unavoidable triggers.     For the chronic pain he has been through the pain management program.

## 2017-11-13 NOTE — MR AVS SNAPSHOT
After Visit Summary   11/13/2017    Abhijeet Mccauley    MRN: [id number]           Patient Information     Date Of Birth          1960        Visit Information        Provider Department      11/13/2017 2:00 PM Parrish Funk MD [facility]        Today's Diagnoses     Gastroesophageal reflux disease without esophagitis    -  1    CKD (chronic kidney disease) stage 3, GFR 30-59 ml/min        Acute alcoholic hepatitis        Shortness of breath        Mild intermittent asthma without complication          Care Instructions          Thank you for choosing [facility].  You may be receiving a survey in the mail from Jadon Carter regarding your visit today.  Please take a few minutes to complete and return the survey to let us know how we are doing.      If you have questions or concerns, please contact us via [employer] or you can contact your care team at [phone].    Our Clinic hours are:  Monday 6:40 am  to 7:00 pm  Tuesday -Friday 6:40 am to 5:00 pm    The [place] outpatient lab hours are:  Monday - Friday 6:10 am to 4:45 pm  Saturdays 7:00 am to 11:00 am  Appointments are required, call [phone]    If you have clinical questions after hours or would like to schedule an appointment,  call the clinic at [phone].    (K21.9) Gastroesophageal reflux disease without esophagitis  (primary encounter diagnosis)  Comment:   Plan: propranolol (INDERAL) 20 MG tablet, potassium         chloride SA (K-DUR/KLOR-CON M) 10 MEQ CR         tablet, furosemide (LASIX) 20 MG tablet,         pantoprazole sodium (PROTONIX) 40 MG packet        We discussed the medications and refilled Pantoprazole for one year. Use this and the blander diet and the goal for the symptoms of reflux is to be zero.   If doing well then refill and recheck annually.     (N18.3) CKD (chronic kidney disease) stage 3, GFR 30-59 ml/min  Comment:   Plan: Comprehensive metabolic panel, NEPHROLOGY ADULT         REFERRAL        For the kidney we will order the creatinine, or the lab for the kidney function. Do the lab today and we will call the results.   The referral to our Nephrologist, Dr. Lewis, is done today. Call for that appt.     (K70.10) Acute alcoholic hepatitis  Comment:   Plan: Comprehensive metabolic panel        For the liver, we discussed lowering the amount of alcohol and eventually stopping. He has gone through CD treatment.   We discussed aftercare and having a sponsor. We discussed the issues related in chronic liver disease.     (R06.02) Shortness of breath  Comment:   Plan: BNP-N terminal pro        We refilled the Albuterol inhaler for one year. Use as directed, 2 puffs  by one minute. Use every 3-4 hours as needed.   Identify triggers to avoid and use the inhaler before unavoidable triggers.     (J45.20) Mild intermittent asthma without complication  Comment:   Plan: albuterol (PROAIR HFA/PROVENTIL HFA/VENTOLIN         HFA) 108 (90 BASE) MCG/ACT Inhaler        We refilled the Albuterol inhaler for one year. Use as directed, 2 puffs  by one minute. Use every 3-4 hours as needed.   Identify triggers to avoid and use the inhaler before unavoidable triggers.     For the chronic pain he has been through the pain management program.             Follow-ups after your visit        Additional Services     NEPHROLOGY ADULT REFERRAL       Your provider has referred you to: call our Referral dept at [phone] for the nearest Nephrologist, and this could be Dr. Lewis in [place].     Please be aware that coverage of these services is subject to the terms and limitations of your health insurance plan.  Call member services at your health plan with any benefit or coverage questions.      Reason for referral:  Single eGFR in past 12 months <30 ml/min.   Please bring the following to your appointment:    >>   Any x-rays, CTs or MRIs which have been performed.  Contact the facility where they  "were done to arrange for  prior to your scheduled appointment.   >>   List of current medications   >>   This referral request   >>   Any documents/labs given to you for this referral                  Who to contact     If you have questions or need follow up information about today's clinic visit or your schedule please contact [facility] directly at [phone].  Normal or non-critical lab and imaging results will be communicated to you by MyChart, letter or phone within 4 business days after the clinic has received the results. If you do not hear from us within 7 days, please contact the clinic through [employer] or phone. If you have a critical or abnormal lab result, we will notify you by phone as soon as possible.  Submit refill requests through [employer] or call your pharmacy and they will forward the refill request to us. Please allow 3 business days for your refill to be completed.          Additional Information About Your Visit        Squareknot Information     [employer] lets you send messages to your doctor, view your test results, renew your prescriptions, schedule appointments and more. To sign up, go to [email or web address] . Click on \"Log in\" on the left side of the screen, which will take you to the Welcome page. Then click on \"Sign up Now\" on the right side of the page.     You will be asked to enter the access code listed below, as well as some personal information. Please follow the directions to create your username and password.     Your access code is: [email or web address]  Expires: 2017  2:55 PM     Your access code will  in 90 days. If you need help or a new code, please call your [facility] or [phone].        Care EveryWhere ID     This is your Care EveryWhere ID. This could be used by other organizations to access your [place] medical records  [id number]        Your Vitals Were     Pulse Temperature Height BMI (Body Mass Index)          75 97.3  F (36.3  C) " "(Tympanic) 5' 5\" (1.651 m) 32.45 kg/m2         Blood Pressure from Last 3 Encounters:   11/13/17 107/53   08/16/17 127/69   07/11/17 107/63    Weight from Last 3 Encounters:   11/13/17 195 lb (88.5 kg)   08/16/17 188 lb 12.8 oz (85.6 kg)   07/11/17 186 lb (84.4 kg)              We Performed the Following     BNP-N terminal pro     Comprehensive metabolic panel     NEPHROLOGY ADULT REFERRAL          Today's Medication Changes          These changes are accurate as of: 11/13/17  2:51 PM.  If you have any questions, ask your nurse or doctor.               Start taking these medicines.        Dose/Directions    albuterol 108 (90 BASE) MCG/ACT Inhaler   Commonly known as:  PROAIR HFA/PROVENTIL HFA/VENTOLIN HFA   Used for:  Mild intermittent asthma without complication   Started by:  Parrish Funk MD        Dose:  2 puff   Inhale 2 puffs into the lungs every 4 hours as needed   Quantity:  1 Inhaler   Refills:  11         These medicines have changed or have updated prescriptions.        Dose/Directions    pantoprazole sodium 40 MG packet   Commonly known as:  PROTONIX   This may have changed:  medication strength   Used for:  Gastroesophageal reflux disease without esophagitis   Changed by:  Parrish Funk MD        Dose:  40 mg   Take 1 packet (40 mg) by mouth every morning (before breakfast)   Quantity:  30 packet   Refills:  11         Stop taking these medicines if you haven't already. Please contact your care team if you have questions.     amLODIPine 5 MG tablet   Commonly known as:  NORVASC   Stopped by:  Parrish Funk MD           atenolol 50 MG tablet   Commonly known as:  TENORMIN   Stopped by:  Parrish Funk MD           zolpidem 5 MG tablet   Commonly known as:  AMBIEN   Stopped by:  Parrish Funk MD                Where to get your medicines      These medications were sent to [facility] [place], [place] - [address] [facility]  [address] [facility] " [id number]     Phone:  [phone]     albuterol 108 (90 BASE) MCG/ACT Inhaler    pantoprazole sodium 40 MG packet                Primary Care Provider Office Phone # Fax #    Parrish Funk [phone] [address] [facility] [address]        Equal Access to Services     JORGE COOL : Hadii aad ku hadasho Soomaali, waaxda luqadaha, qaybta kaalmada adeegyada, waxay idiin hayaan adeeg khcasandrash ladharmeshn . So [facility] [phone].    ATENCIÓN: Si habla español, tiene a uriostegui disposición servicios gratuitos de asistencia lingüística. Llame al [phone].    We comply with applicable federal civil rights laws and [place] laws. We do not discriminate on the basis of race, color, national origin, age, disability, sex, sexual orientation, or gender identity.            Thank you!     Thank you for choosing [facility]  for your care. Our goal is always to provide you with excellent care. Hearing back from our patients is one way we can continue to improve our services. Please take a few minutes to complete the written survey that you may receive in the mail after your visit with us. Thank you!             Your Updated Medication List - Protect others around you: Learn how to safely use, store and throw away your medicines at www.disposemymeds.org.          This list is accurate as of: 11/13/17  2:51 PM.  Always use your most recent med list.                   Brand Name Dispense Instructions for use Diagnosis    acetaminophen 500 MG tablet    TYLENOL     Take 1-2 tablets by mouth every 6 hours as needed.        albuterol 108 (90 BASE) MCG/ACT Inhaler    PROAIR HFA/PROVENTIL HFA/VENTOLIN HFA    1 Inhaler    Inhale 2 puffs into the lungs every 4 hours as needed    Mild intermittent asthma without complication       desonide 0.05 % lotion    DESOWEN    118 mL    Apply topically as needed    Rosacea       LASIX 20 MG tablet   Generic drug:  furosemide      Take 1 tablet (20 mg) by mouth 2  times daily    Gastroesophageal reflux disease without esophagitis       loratadine 10 MG tablet    CLARITIN    30 tablet    Take 1 tablet by mouth daily as needed for allergies.    Allergic rhinitis       Melatonin ER 5 MG Tbcr      Take 1 tablet by mouth At Bedtime        naproxen sodium 220 MG tablet    ANAPROX     Take 220 mg by mouth 2 times daily (with meals).        pantoprazole sodium 40 MG packet    PROTONIX    30 packet    Take 1 packet (40 mg) by mouth every morning (before breakfast)    Gastroesophageal reflux disease without esophagitis       potassium chloride SA 10 MEQ CR tablet    K-DUR/KLOR-CON M     Take 1 tablet (10 mEq) by mouth daily    Gastroesophageal reflux disease without esophagitis       propranolol 20 MG tablet    INDERAL     Take 1 tablet (20 mg) by mouth 2 times daily    Gastroesophageal reflux disease without esophagitis       SODIUM BICARBONATE PO      Take 650 mg by mouth daily as needed        TRAZODONE HCL PO      Take 50 mg by mouth At Bedtime

## 2017-11-27 ENCOUNTER — HOSPITAL ENCOUNTER (OUTPATIENT)
Dept: CARDIOLOGY | Facility: CLINIC | Age: 57
Discharge: HOME | End: 2017-11-27
Attending: FAMILY MEDICINE | Admitting: FAMILY MEDICINE
Payer: COMMERCIAL

## 2017-11-27 DIAGNOSIS — R06.02 SHORTNESS OF BREATH: ICD-10-CM

## 2017-11-27 PROCEDURE — 93306 TTE W/DOPPLER COMPLETE: CPT | Mod: 26 | Performed by: INTERNAL MEDICINE

## 2017-11-27 PROCEDURE — 93306 TTE W/DOPPLER COMPLETE: CPT

## 2017-12-19 ENCOUNTER — TELEPHONE (OUTPATIENT)
Dept: AUDIOLOGY | Facility: CLINIC | Age: 57
End: 2017-12-19

## 2017-12-19 NOTE — TELEPHONE ENCOUNTER
Discussed with patient that his insurance allows new hearing aids every 5 years. Patient has a recheck appointment scheduled with me next month as his hearing aids are not working well again. He reports the  wires are all bent.    Ai KUNZ, #[id number]

## 2017-12-19 NOTE — TELEPHONE ENCOUNTER
Reason for Call:  Other call back    Detailed comments: pt calling stating he would like to know if he will be eligible for new hearing aids come [place]? His current ones are causing him much irritation and itching in his ears.     Phone Number Patient can be reached at: Other phone number:  [phone]*    Best Time: any     Can we leave a detailed message on this number? YES    Call taken on 12/19/2017 at 11:20 AM by Elena Jin

## 2018-01-04 ENCOUNTER — OFFICE VISIT (OUTPATIENT)
Dept: AUDIOLOGY | Facility: CLINIC | Age: 58
End: 2018-01-04
Payer: COMMERCIAL

## 2018-01-04 DIAGNOSIS — H90.3 SENSORINEURAL HEARING LOSS, ASYMMETRICAL: Primary | ICD-10-CM

## 2018-01-04 PROCEDURE — V5266 BATTERY FOR HEARING DEVICE: HCPCS | Performed by: AUDIOLOGIST

## 2018-01-04 PROCEDURE — 92593 HC HEARING AID CHECK, BINAURAL: CPT | Performed by: AUDIOLOGIST

## 2018-01-04 PROCEDURE — 99207 ZZC NO CHARGE LOS: CPT | Performed by: AUDIOLOGIST

## 2018-01-04 NOTE — PROGRESS NOTES
57 year old male comes in with his 2014 Phonak Aude[employer] [id number] hearing aids for in office repair. He reports the hearing aids began itching in his ears 3 weeks ago. He states he is also having trouble with the retention tail on the right hearing aid.    An otoscopic examination was done and revealed clear external auditory canals bilaterally.    Replaced large open domes bilaterally. Replaced retention tail on the right hearing aid as patient had it on incorrectly. Patient is doing a much better job of keeping the hearing aids clean.    Verified hearing aid functionality.      Patient was given 30 size 312 hearing aid batteries, 2 packages of wax guards and extra domes.     See chart in the hearing aid room.     Ai Min M.A. F-AAA, #[id number]

## 2018-01-04 NOTE — MR AVS SNAPSHOT
"              After Visit Summary   2018    Abhijeet Mccauley    MRN: [id number]           Patient Information     Date Of Birth          1960        Visit Information        Provider Department      2018 3:30 PM Ai Min [facility]D [facility]        Today's Diagnoses     Sensorineural hearing loss, asymmetrical    -  1       Follow-ups after your visit        Who to contact     If you have questions or need follow up information about today's clinic visit or your schedule please contact [facility] directly at [phone].  Normal or non-critical lab and imaging results will be communicated to you by [email or web address], letter or phone within 4 business days after the clinic has received the results. If you do not hear from us within 7 days, please contact the clinic through [employer] or phone. If you have a critical or abnormal lab result, we will notify you by phone as soon as possible.  Submit refill requests through [employer] or call your pharmacy and they will forward the refill request to us. Please allow 3 business days for your refill to be completed.          Additional Information About Your Visit        MyChart Information     [employer] lets you send messages to your doctor, view your test results, renew your prescriptions, schedule appointments and more. To sign up, go to [email or web address] . Click on \"Log in\" on the left side of the screen, which will take you to the Welcome page. Then click on \"Sign up Now\" on the right side of the page.     You will be asked to enter the access code listed below, as well as some personal information. Please follow the directions to create your username and password.     Your access code is: [email or web address]  Expires: 2018  3:54 PM     Your access code will  in 90 days. If you need help or a new code, please call your [facility] or [phone].        Care EveryWhere ID     This is your Care EveryWhere ID. This could be used by " other organizations to access your [place] medical records  [id number]         Blood Pressure from Last 3 Encounters:   11/13/17 107/53   08/16/17 127/69   07/11/17 107/63    Weight from Last 3 Encounters:   11/13/17 195 lb (88.5 kg)   08/16/17 188 lb 12.8 oz (85.6 kg)   07/11/17 186 lb (84.4 kg)              We Performed the Following     HC HEARING DEVICE BATTERY     HEARING AID CHECK, BINAURAL        Primary Care Provider Office Phone # Fax #    Parrish Kristopher Funk [phone] [address] [facility] [address]        Equal Access to Services     JORGE COOL : Hadii sameera [place], waseanda kj, qaybta kaalmada [place], song ureña. So [facility] [phone].    ATENCIÓN: Si habla español, tiene a uriostegui disposición servicios gratuitos de asistencia lingüística. Llame al [phone].    We comply with applicable federal civil rights laws and [place] laws. We do not discriminate on the basis of race, color, national origin, age, disability, sex, sexual orientation, or gender identity.            Thank you!     Thank you for choosing [facility]  for your care. Our goal is always to provide you with excellent care. Hearing back from our patients is one way we can continue to improve our services. Please take a few minutes to complete the written survey that you may receive in the mail after your visit with us. Thank you!             Your Updated Medication List - Protect others around you: Learn how to safely use, store and throw away your medicines at www.disposemymeds.org.          This list is accurate as of: 1/4/18  3:54 PM.  Always use your most recent med list.                   Brand Name Dispense Instructions for use Diagnosis    acetaminophen 500 MG tablet    TYLENOL     Take 1-2 tablets by mouth every 6 hours as needed.        albuterol 108 (90 BASE) MCG/ACT Inhaler    PROAIR HFA/PROVENTIL HFA/VENTOLIN HFA    1 Inhaler     Inhale 2 puffs into the lungs every 4 hours as needed    Mild intermittent asthma without complication       desonide 0.05 % lotion    DESOWEN    118 mL    Apply topically as needed    Rosacea       LASIX 20 MG tablet   Generic drug:  furosemide      Take 1 tablet (20 mg) by mouth 2 times daily    Gastroesophageal reflux disease without esophagitis       loratadine 10 MG tablet    CLARITIN    30 tablet    Take 1 tablet by mouth daily as needed for allergies.    Allergic rhinitis       Melatonin ER 5 MG Tbcr      Take 1 tablet by mouth At Bedtime        naproxen sodium 220 MG tablet    ANAPROX     Take 220 mg by mouth 2 times daily (with meals).        pantoprazole sodium 40 MG packet    PROTONIX    30 packet    Take 1 packet (40 mg) by mouth every morning (before breakfast)    Gastroesophageal reflux disease without esophagitis       potassium chloride SA 10 MEQ CR tablet    K-DUR/KLOR-CON M     Take 1 tablet (10 mEq) by mouth daily    Gastroesophageal reflux disease without esophagitis       propranolol 20 MG tablet    INDERAL     Take 1 tablet (20 mg) by mouth 2 times daily    Gastroesophageal reflux disease without esophagitis       SODIUM BICARBONATE PO      Take 650 mg by mouth daily as needed        TRAZODONE HCL PO      Take 50 mg by mouth At Bedtime

## 2018-01-22 ENCOUNTER — TELEPHONE (OUTPATIENT)
Dept: FAMILY MEDICINE | Facility: CLINIC | Age: 58
End: 2018-01-22

## 2018-01-22 NOTE — TELEPHONE ENCOUNTER
Reason for Call:  Other call back-eat solid foods    Detailed comments: He was in [facility] for bleeding in his abdomin.  He was given 3 pints of blood.  He is on a liquid diet.   He is wondering if he can eat solid foods again?  Please advise.    Phone Number Patient can be reached at: Home number on file [phone]    Best Time: any    Can we leave a detailed message on this number? YES    Call taken on 1/22/2018 at 12:30 PM by April Leal

## 2018-01-24 ENCOUNTER — OFFICE VISIT (OUTPATIENT)
Dept: FAMILY MEDICINE | Facility: CLINIC | Age: 58
End: 2018-01-24
Payer: COMMERCIAL

## 2018-01-24 VITALS
SYSTOLIC BLOOD PRESSURE: 129 MMHG | OXYGEN SATURATION: 98 % | DIASTOLIC BLOOD PRESSURE: 58 MMHG | BODY MASS INDEX: 31.29 KG/M2 | HEIGHT: 64 IN | HEART RATE: 71 BPM | TEMPERATURE: 97.8 F | WEIGHT: 183.3 LBS

## 2018-01-24 DIAGNOSIS — N17.9 AKI (ACUTE KIDNEY INJURY) (H): ICD-10-CM

## 2018-01-24 DIAGNOSIS — I85.11 SECONDARY ESOPHAGEAL VARICES WITH BLEEDING (H): Primary | ICD-10-CM

## 2018-01-24 LAB
CREAT SERPL-MCNC: 2.15 MG/DL (ref 0.66–1.25)
GFR SERPL CREATININE-BSD FRML MDRD: 32 ML/MIN/1.7M2
HGB BLD-MCNC: 9.9 G/DL (ref 13.3–17.7)

## 2018-01-24 PROCEDURE — 99214 OFFICE O/P EST MOD 30 MIN: CPT | Performed by: INTERNAL MEDICINE

## 2018-01-24 PROCEDURE — 36415 COLL VENOUS BLD VENIPUNCTURE: CPT | Performed by: INTERNAL MEDICINE

## 2018-01-24 PROCEDURE — 82565 ASSAY OF CREATININE: CPT | Performed by: INTERNAL MEDICINE

## 2018-01-24 PROCEDURE — 85018 HEMOGLOBIN: CPT | Performed by: INTERNAL MEDICINE

## 2018-01-24 RX ORDER — SODIUM BICARBONATE 650 MG/1
1 TABLET ORAL
COMMUNITY
Start: 2016-07-27 | End: 2018-03-16

## 2018-01-24 RX ORDER — TIOTROPIUM BROMIDE 18 UG/1
1 CAPSULE ORAL; RESPIRATORY (INHALATION) DAILY
Status: ON HOLD | COMMUNITY
Start: 2018-01-10 | End: 2020-06-16

## 2018-01-24 RX ORDER — FUROSEMIDE 20 MG
20 TABLET ORAL EVERY MORNING
COMMUNITY
Start: 2017-03-16 | End: 2018-03-30

## 2018-01-24 RX ORDER — PANTOPRAZOLE SODIUM 40 MG/1
40 TABLET, DELAYED RELEASE ORAL
COMMUNITY
Start: 2017-09-20 | End: 2018-01-24

## 2018-01-24 RX ORDER — LACTULOSE 10 G/15ML
6.7 SOLUTION ORAL 2 TIMES DAILY
COMMUNITY
Start: 2018-01-13 | End: 2018-03-29

## 2018-01-24 RX ORDER — PROPRANOLOL HYDROCHLORIDE 20 MG/1
20 TABLET ORAL
COMMUNITY
Start: 2017-01-16 | End: 2018-01-24

## 2018-01-24 RX ORDER — POTASSIUM CHLORIDE 750 MG/1
10 TABLET, EXTENDED RELEASE ORAL
COMMUNITY
Start: 2017-03-16 | End: 2018-01-24

## 2018-01-24 RX ORDER — CHLORHEXIDINE GLUCONATE ORAL RINSE 1.2 MG/ML
15 SOLUTION DENTAL
COMMUNITY
Start: 2018-01-13 | End: 2018-01-29

## 2018-01-24 RX ORDER — LORATADINE 10 MG/1
10 TABLET ORAL
COMMUNITY
Start: 2017-09-20 | End: 2018-01-24

## 2018-01-24 RX ORDER — TRAZODONE HYDROCHLORIDE 50 MG/1
TABLET, FILM COATED ORAL
COMMUNITY
Start: 2017-03-16 | End: 2018-01-24

## 2018-01-24 NOTE — MR AVS SNAPSHOT
After Visit Summary   1/24/2018    Abhijeet Mccauley    MRN: [id number]           Patient Information     Date Of Birth          1960        Visit Information        Provider Department      1/24/2018 1:40 PM Chidi Valenzuela MD [facility]        Today's Diagnoses     Secondary esophageal varices with bleeding (H)    -  1    GENEVIEVE (acute kidney injury) (H)          Care Instructions    Schedule the scope of the stomach at [phone].            Follow-ups after your visit        Additional Services     GASTROENTEROLOGY ADULT REF PROCEDURE ONLY       Last Lab Result: Creatinine (mg/dL)       Date                     Value                 11/13/2017               2.67 (H)         ----------  Body mass index is 31.22 kg/(m^2).     Needed:  No  Language:  English    Patient will be contacted to schedule procedure.     Please be aware that coverage of these services is subject to the terms and limitations of your health insurance plan.  Call member services at your health plan with any benefit or coverage questions.  Any procedures must be performed at a [place] facility OR coordinated by your clinic's referral office.    Please bring the following with you to your appointment:    (1) Any X-Rays, CTs or MRIs which have been performed.  Contact the facility where they were done to arrange for  prior to your scheduled appointment.    (2) List of current medications   (3) This referral request   (4) Any documents/labs given to you for this referral                  Who to contact     If you have questions or need follow up information about today's clinic visit or your schedule please contact [facility] directly at [phone].  Normal or non-critical lab and imaging results will be communicated to you by MyChart, letter or phone within 4 business days after the clinic has received the results. If you do not hear from us within 7 days, please contact the  "clinic through [employer] or phone. If you have a critical or abnormal lab result, we will notify you by phone as soon as possible.  Submit refill requests through [employer] or call your pharmacy and they will forward the refill request to us. Please allow 3 business days for your refill to be completed.          Additional Information About Your Visit        [employer] Information     [employer] lets you send messages to your doctor, view your test results, renew your prescriptions, schedule appointments and more. To sign up, go to [email or web address] . Click on \"Log in\" on the left side of the screen, which will take you to the Welcome page. Then click on \"Sign up Now\" on the right side of the page.     You will be asked to enter the access code listed below, as well as some personal information. Please follow the directions to create your username and password.     Your access code is: [email or web address]  Expires: 2018  3:54 PM     Your access code will  in 90 days. If you need help or a new code, please call your [place] clinic or [phone].        Care EveryWhere ID     This is your Care EveryWhere ID. This could be used by other organizations to access your [place] medical records  [id number]        Your Vitals Were     Pulse Temperature Height Pulse Oximetry BMI (Body Mass Index)       71 97.8  F (36.6  C) (Tympanic) 5' 4.25\" (1.632 m) 98% 31.22 kg/m2        Blood Pressure from Last 3 Encounters:   18 129/58   17 107/53   17 127/69    Weight from Last 3 Encounters:   18 183 lb 4.8 oz (83.1 kg)   17 195 lb (88.5 kg)   17 188 lb 12.8 oz (85.6 kg)              We Performed the Following     Creatinine     GASTROENTEROLOGY ADULT REF PROCEDURE ONLY     Hemoglobin        Primary Care Provider Office Phone # Fax #    Parrish Funk [phone] [address] [facility] [address]        Equal Access to Services     JORGE COOL AH: Ashwin mcknight " joy Huddleston, waseanda luqadaha, qaybta kaalmada irlanda, song rodriguez eliandelroy markmariana ladharmeshmichael adelita. So [facility] [phone].    ATENCIÓN: Si shannanla jonn, tiene a uriostegui disposición servicios gratuitos de asistencia lingüística. Claudy al [phone].    We comply with applicable federal civil rights laws and [place] laws. We do not discriminate on the basis of race, color, national origin, age, disability, sex, sexual orientation, or gender identity.            Thank you!     Thank you for choosing [facility]  for your care. Our goal is always to provide you with excellent care. Hearing back from our patients is one way we can continue to improve our services. Please take a few minutes to complete the written survey that you may receive in the mail after your visit with us. Thank you!             Your Updated Medication List - Protect others around you: Learn how to safely use, store and throw away your medicines at www.disposemymeds.org.          This list is accurate as of 1/24/18  2:21 PM.  Always use your most recent med list.                   Brand Name Dispense Instructions for use Diagnosis    acetaminophen 500 MG tablet    TYLENOL     Take 1-2 tablets by mouth every 6 hours as needed.        albuterol 108 (90 BASE) MCG/ACT Inhaler    PROAIR HFA/PROVENTIL HFA/VENTOLIN HFA    1 Inhaler    Inhale 2 puffs into the lungs every 4 hours as needed    Mild intermittent asthma without complication       benzocaine-menthol 15-3.6 MG lozenge    CEPACOL     Take 1 lozenge by mouth        chlorhexidine 0.12 % solution    PERIDEX     15 mLs        desonide 0.05 % lotion    DESOWEN    118 mL    Apply topically as needed    Rosacea       lactulose 10 GM/15ML solution    CHRONULAC     Take 6.7 g by mouth        * furosemide 20 MG tablet    LASIX     Take 20 mg by mouth        * LASIX 20 MG tablet   Generic drug:  furosemide      Take 1 tablet (20 mg) by mouth 2 times daily    Gastroesophageal reflux disease  without esophagitis       * loratadine 10 MG tablet    CLARITIN    30 tablet    Take 1 tablet by mouth daily as needed for allergies.    Allergic rhinitis       * loratadine 10 MG tablet    CLARITIN     Take 10 mg by mouth        * melatonin 5 MG tablet      Take 5 mg by mouth        * Melatonin ER 5 MG Tbcr      Take 1 tablet by mouth At Bedtime        mometasone-formoterol 200-5 MCG/ACT oral inhaler    DULERA     Inhale 2 puffs into the lungs        naproxen sodium 220 MG tablet    ANAPROX     Take 220 mg by mouth 2 times daily (with meals).        * pantoprazole 40 MG EC tablet    PROTONIX     Take 40 mg by mouth        * pantoprazole sodium 40 MG packet    PROTONIX    30 packet    Take 1 packet (40 mg) by mouth every morning (before breakfast)    Gastroesophageal reflux disease without esophagitis       potassium chloride 10 MEQ tablet    K-TAB,KLOR-CON     Take 10 mEq by mouth        potassium chloride SA 10 MEQ CR tablet    K-DUR/KLOR-CON M     Take 1 tablet (10 mEq) by mouth daily    Gastroesophageal reflux disease without esophagitis       * propranolol 20 MG tablet    INDERAL     Take 20 mg by mouth        * propranolol 20 MG tablet    INDERAL     Take 1 tablet (20 mg) by mouth 2 times daily    Gastroesophageal reflux disease without esophagitis       * SODIUM BICARBONATE PO      Take 650 mg by mouth daily as needed        * sodium bicarbonate 650 MG tablet      Take 1 tablet by mouth        tiotropium 18 MCG capsule    SPIRIVA     Inhale 1 capsule into the lungs        * TRAZODONE HCL PO      Take 50 mg by mouth At Bedtime        * traZODone 50 MG tablet    DESYREL     TAKE 1 TABLET (50 MG) BY MOUTH AT BEDTIME.        * Notice:  This list has 14 medication(s) that are the same as other medications prescribed for you. Read the directions carefully, and ask your doctor or other care provider to review them with you.

## 2018-01-24 NOTE — LETTER
January 25, 2018      Abhijeet Mccauley  [address]  [facility] [address]        Dear ,    We are writing to inform you of your test results.    Hemoglobin has improved to 9.9 from 8.1 at the outside hospital.  Kidney function is stable.      Resulted Orders   Hemoglobin   Result Value Ref Range    Hemoglobin 9.9 (L) 13.3 - 17.7 g/dL   Creatinine   Result Value Ref Range    Creatinine 2.15 (H) 0.66 - 1.25 mg/dL    GFR Estimate 32 (L) >60 mL/min/1.7m2      Comment:      Non  GFR Calc    GFR Estimate If Black 38 (L) >60 mL/min/1.7m2      Comment:       GFR Calc       If you have any questions or concerns, please call the clinic at the number listed above.       Sincerely,        Chidi Valenzuela MD

## 2018-01-24 NOTE — PROGRESS NOTES
SUBJECTIVE:   Abhijeet Mccauley is a 57 year old male who presents to clinic today for the following health issues:  Chief Complaint   Patient presents with     Hospital F/U     [facility], 1/10/18 - 1/13/18, internal bleeding      Blood Draw         Hospital Follow-up Visit:    Hospital/Nursing Home/IP Rehab Facility: [facility]   Date of Admission: 1/10/18  Date of Discharge: 1/13/18  Reason(s) for Admission: Gastrointestinal hemorrhage             Problems taking medications regularly:  None       Medication changes since discharge: None       Problems adhering to non-medication therapy:  He states he is taking whatever is prescribed, though he's not sure what all that is      Summary of hospitalization:  [facility] information obtained and reviewed    Abhijeet presented with SOB and leg pain and was found to be anemic down to 6.3 from a GI bleed from esophageal varices.  He has a history of alcoholic cirrhosis and prior varices.  He had been drinking daily prior to admission.  He was placed on IVF, IV protonix, IV Rocephin, and IV octreotide and had an EGD that showed varices.  Banding was done.  He was transfused 3 units of pRBCs and hemoglobin stabilized at 7.8.  He was discharged on PO Cipro and told to have a follow-up EGD in 2 weeks.  PTA propranolol was continued.      He did not show any symptoms of withdrawal inpatient.  His lactulose was titrated down.  He has a history of CKD and creatinine was 2.35 on admission and improved to 2.01 at discharge.     Diagnostic Tests/Treatments reviewed.  Follow up needed: EGD, hemoglobin  Other Healthcare Providers Involved in Patient s Care:         Abhijeet does not wish to follow-up with GI or nephrology  Update since discharge: improved.     Abhijeet states he is feeling much better.  His SOB improved.  He has not had any further black stools.  No dizziness, fatigue, chest pain.  He has decreased his alcohol consumption- no longer drinking liquors, says he's  only had a couple of beers.  No withdrawal symptoms.      Post Discharge Medication Reconciliation: discharge medications reconciled, continue medications without change.  Plan of care communicated with patient              Problem list and histories reviewed & adjusted, as indicated.  Additional history: as documented    Patient Active Problem List   Diagnosis     Essential hypertension     Acute myeloid leukemia in remission (H)     Sensorineural hearing loss, asymmetrical     Alcoholic cirrhosis of liver (H)     Acute alcoholic hepatitis     Coagulation defect (H)     Osteoarthrosis     Hemorrhage of gastrointestinal tract     Thrombocytopenia (H)     Universal ulcerative (chronic) colitis(556.6) (H)     CKD (chronic kidney disease) stage 3, GFR 30-59 ml/min     Rosacea     Mild intermittent asthma without complication     History reviewed. No pertinent surgical history.    Social History   Substance Use Topics     Smoking status: Current Every Day Smoker     Packs/day: 0.50     Years: 30.00     Types: Cigarettes     Smokeless tobacco: Never Used      Comment: declined quit plan     Alcohol use Yes      Comment: 6-7 beers per day.  Sometimes a cocktail also.     Family History   Problem Relation Age of Onset     Hypertension Mother      Coronary Artery Disease Father      MI         Current Outpatient Prescriptions   Medication Sig Dispense Refill     benzocaine-menthol (CEPACOL) 15-3.6 MG lozenge Take 1 lozenge by mouth       chlorhexidine (PERIDEX) 0.12 % solution 15 mLs       lactulose (CHRONULAC) 10 GM/15ML solution Take 6.7 g by mouth       mometasone-formoterol (DULERA) 200-5 MCG/ACT oral inhaler Inhale 2 puffs into the lungs       tiotropium (SPIRIVA) 18 MCG capsule Inhale 1 capsule into the lungs       furosemide (LASIX) 20 MG tablet Take 20 mg by mouth daily        sodium bicarbonate 650 MG tablet Take 1 tablet by mouth       propranolol (INDERAL) 20 MG tablet Take 1 tablet (20 mg) by mouth 2 times daily  "      potassium chloride SA (K-DUR/KLOR-CON M) 10 MEQ CR tablet Take 1 tablet (10 mEq) by mouth daily       pantoprazole sodium (PROTONIX) 40 MG packet Take 1 packet (40 mg) by mouth every morning (before breakfast) 30 packet 11     Melatonin ER 5 MG TBCR Take 1 tablet by mouth At Bedtime       TRAZODONE HCL PO Take 50 mg by mouth At Bedtime        loratadine (CLARITIN) 10 MG tablet Take 1 tablet by mouth daily as needed for allergies. 30 tablet 6     acetaminophen (TYLENOL) 500 MG tablet Take 1-2 tablets by mouth every 6 hours as needed.       [DISCONTINUED] propranolol (INDERAL) 20 MG tablet Take 20 mg by mouth       [DISCONTINUED] traZODone (DESYREL) 50 MG tablet TAKE 1 TABLET (50 MG) BY MOUTH AT BEDTIME.       albuterol (PROAIR HFA/PROVENTIL HFA/VENTOLIN HFA) 108 (90 BASE) MCG/ACT Inhaler Inhale 2 puffs into the lungs every 4 hours as needed 1 Inhaler 11     [DISCONTINUED] furosemide (LASIX) 20 MG tablet Take 1 tablet (20 mg) by mouth 2 times daily       desonide (DESOWEN) 0.05 % lotion Apply topically as needed 118 mL 1     SODIUM BICARBONATE PO Take 650 mg by mouth daily as needed        naproxen sodium (ANAPROX) 220 MG tablet Take 220 mg by mouth 2 times daily (with meals).       Allergies   Allergen Reactions     Allergy      Ibuprofen     Motrin [Nsaids]      Pepcid Cramps     Severe abdominal cramps     Vancomycin      Other reaction(s): Other  hallucinations     Vfend      IV       Reviewed and updated as needed this visit by clinical staff  Tobacco  Allergies  Med Hx  Surg Hx  Fam Hx  Soc Hx      Reviewed and updated as needed this visit by Provider         ROS:  Constitutional, gi systems are negative, except as otherwise noted.    OBJECTIVE:     /58 (BP Location: Right arm, Patient Position: Chair, Cuff Size: Adult Regular)  Pulse 71  Temp 97.8  F (36.6  C) (Tympanic)  Ht 5' 4.25\" (1.632 m)  Wt 183 lb 4.8 oz (83.1 kg)  SpO2 98%  BMI 31.22 kg/m2  Body mass index is 31.22 " kg/(m^2).    GENERAL: healthy, alert and no distress  RESP: lungs clear to auscultation - no rales, rhonchi or wheezes  CV: regular rate and rhythm, normal S1 S2, no S3 or S4, no murmur, click or rub  ABDOMEN: soft, no tenderness, normal bowel sounds      Diagnostic Test Results:  Results for orders placed or performed in visit on 01/24/18 (from the past 24 hour(s))   Hemoglobin   Result Value Ref Range    Hemoglobin 9.9 (L) 13.3 - 17.7 g/dL       ASSESSMENT/PLAN:       1. Secondary esophageal varices with bleeding (H)    Hemoglobin has improved from 7.8 at discharge up to 9.9 and he is feeling better without evidence of further bleeding.  Continue current meds.  Encouraged complete alcohol cessation.  He would prefer to have 2 week follow-up EGD here, so I place order.      - Hemoglobin  - GASTROENTEROLOGY ADULT REF PROCEDURE ONLY    2. GENEVIEVE (acute kidney injury) (H)    Creatinine had improved to 2.01 at the time of discharge, which is around his baseline.  Will recheck to make sure still doing okay.      - Creatinine    LoriSheila Valenzuela MD  [facility]

## 2018-01-24 NOTE — NURSING NOTE
"Chief Complaint   Patient presents with     [facility] F/U     [facility] [place] [facility], 1/10/18 - 1/13/18, internal bleeding        Initial Ht 5' 4.25\" (1.632 m)  Wt 183 lb 4.8 oz (83.1 kg)  BMI 31.22 kg/m2 Estimated body mass index is 31.22 kg/(m^2) as calculated from the following:    Height as of this encounter: 5' 4.25\" (1.632 m).    Weight as of this encounter: 183 lb 4.8 oz (83.1 kg).  Medication Reconciliation: complete   April MONDRAGON CMA (AAMA)    "

## 2018-01-25 ENCOUNTER — TELEPHONE (OUTPATIENT)
Dept: FAMILY MEDICINE | Facility: CLINIC | Age: 58
End: 2018-01-25

## 2018-01-25 NOTE — TELEPHONE ENCOUNTER
Pt is being followed for esophageal varices secondary to alcohol.  He was seen for hospital follow up yesterday.  Pt explains that he has been eating soft foods for nearly two weeks.  He wants to know if he can advance his diet?  Informed that Dr Valenzuela is out of clinic today and due back in clinic tomorrow.  Will wait for her instructions.  Zulema Gramajo RN

## 2018-02-05 DIAGNOSIS — K21.9 GASTROESOPHAGEAL REFLUX DISEASE WITHOUT ESOPHAGITIS: ICD-10-CM

## 2018-02-05 NOTE — TELEPHONE ENCOUNTER
"Requested Prescriptions   Pending Prescriptions Disp Refills     propranolol (INDERAL) 20 MG tablet  Last Written Prescription Date:  11/13/2017 (historical)  Last Fill Quantity: 0,  # refills: 0   Last Office Visit with [facility] provider:  01/24/2018   Future Office Visit:      90 tablet      Sig: Take 1 tablet (20 mg) by mouth 2 times daily    Beta-Blockers Protocol Passed    2/5/2018 12:53 PM       Passed - Blood pressure under 140/90    BP Readings from Last 3 Encounters:   01/24/18 129/58   11/13/17 107/53   08/16/17 127/69                Passed - Patient is age 6 or older       Passed - Recent or future visit with authorizing provider's specialty    Patient had office visit in the last year or has a visit in the next 30 days with authorizing provider.  See \"Patient Info\" tab in inbasket, or \"Choose Columns\" in Meds & Orders section of the refill encounter.             Zaheer Wise RT (R)    "

## 2018-02-06 ENCOUNTER — ANESTHESIA EVENT (OUTPATIENT)
Dept: GASTROENTEROLOGY | Facility: CLINIC | Age: 58
End: 2018-02-06
Payer: COMMERCIAL

## 2018-02-06 ASSESSMENT — LIFESTYLE VARIABLES: TOBACCO_USE: 1

## 2018-02-06 NOTE — ANESTHESIA PREPROCEDURE EVALUATION
Anesthesia Evaluation     .             ROS/MED HX    ENT/Pulmonary:     (+)tobacco use, Current use Intermittent asthma , . .    Neurologic:       Cardiovascular:     (+) Dyslipidemia, hypertension----. : . . . :. . Previous cardiac testing Echodate:11/27/17results:Interpretation Summary     Left ventricular systolic function is normal.  The visual ejection fraction is estimated at 55-60%.  No regional wall motion abnormalities noted.  _____________________________________________________________________________  __        Left Ventricle  The left ventricle is normal in size. Left ventricular systolic function is  normal. The visual ejection fraction is estimated at 55-60%. Grade I or early  diastolic dysfunction. No regional wall motion abnormalities noted.     Right Ventricle  The right ventricle is normal in structure, function and size.     Atria  The left atrium is moderately dilated. Right atrium not well visualized.     Mitral Valve  There is mild mitral annular calcification. There is trace mitral  regurgitation. There is no mitral valve stenosis.        Tricuspid Valve  The tricuspid valve is not well visualized, but is grossly normal. Right  ventricular systolic pressure could not be approximated due to inadequate  tricuspid regurgitation.     Aortic Valve  The aortic valve is not well visualized. The aortic valve is trileaflet. The  calculated aortic valve are is 2.1 cm^2. Increased aortic valve velocity.     Pulmonic Valve  The pulmonic valve is not well seen, but is grossly normal. Normal pulmonic  valve velocity.     Vessels  Normal size aorta. The inferior vena cava is not dilated.     Pericardium  There is no pericardial effusion.date: results: date: results: date: results:          METS/Exercise Tolerance:  >4 METS   Hematologic:     (+) Other Hematologic Disorder-Thrombocytopenia      Musculoskeletal:   (+) arthritis, , , -       GI/Hepatic:     (+) liver disease, Other GI/Hepatic ETOH  cirrhosis; Ulcerative colitis      Renal/Genitourinary:     (+) chronic renal disease,       Endo:     (+) Obesity, .      Psychiatric:         Infectious Disease:  - neg infectious disease ROS       Malignancy:   (+) Malignancy History of Lymphoma/Leukemia  Lymph CA Remission status post,         Other:                     Physical Exam  Normal systems: cardiovascular, pulmonary and dental    Airway   Mallampati: II  TM distance: >3 FB  Neck ROM: full    Dental     Cardiovascular       Pulmonary                     Anesthesia Plan      History & Physical Review  History and physical reviewed and following examination; no interval change.    ASA Status:  2 .    NPO Status:  > 6 hours    Plan for MAC Reason for MAC:  Deep or markedly invasive procedure (G8)         Postoperative Care      Consents  Anesthetic plan, risks, benefits and alternatives discussed with:  Patient..                          .

## 2018-02-07 RX ORDER — PROPRANOLOL HYDROCHLORIDE 20 MG/1
20 TABLET ORAL 2 TIMES DAILY
Qty: 90 TABLET | Refills: 3 | Status: SHIPPED | OUTPATIENT
Start: 2018-02-07 | End: 2018-07-31

## 2018-02-08 ENCOUNTER — HOSPITAL ENCOUNTER (OUTPATIENT)
Facility: CLINIC | Age: 58
Discharge: HOME | End: 2018-02-08
Attending: SURGERY | Admitting: SURGERY
Payer: COMMERCIAL

## 2018-02-08 ENCOUNTER — ANESTHESIA (OUTPATIENT)
Dept: GASTROENTEROLOGY | Facility: CLINIC | Age: 58
End: 2018-02-08
Payer: COMMERCIAL

## 2018-02-08 ENCOUNTER — SURGERY (OUTPATIENT)
Age: 58
End: 2018-02-08

## 2018-02-08 VITALS
SYSTOLIC BLOOD PRESSURE: 124 MMHG | DIASTOLIC BLOOD PRESSURE: 66 MMHG | TEMPERATURE: 98.8 F | RESPIRATION RATE: 14 BRPM | HEIGHT: 66 IN | HEART RATE: 71 BPM | WEIGHT: 190 LBS | OXYGEN SATURATION: 97 % | BODY MASS INDEX: 30.53 KG/M2

## 2018-02-08 DIAGNOSIS — B37.9 CANDIDA INFECTION: Primary | ICD-10-CM

## 2018-02-08 LAB — UPPER GI ENDOSCOPY: NORMAL

## 2018-02-08 PROCEDURE — 25000128 H RX IP 250 OP 636: Performed by: NURSE ANESTHETIST, CERTIFIED REGISTERED

## 2018-02-08 PROCEDURE — 43239 EGD BIOPSY SINGLE/MULTIPLE: CPT | Performed by: SURGERY

## 2018-02-08 PROCEDURE — 25000132 ZZH RX MED GY IP 250 OP 250 PS 637: Performed by: SURGERY

## 2018-02-08 PROCEDURE — 37000008 ZZH ANESTHESIA TECHNICAL FEE, 1ST 30 MIN: Performed by: SURGERY

## 2018-02-08 PROCEDURE — 25000125 ZZHC RX 250: Performed by: SURGERY

## 2018-02-08 PROCEDURE — 25000125 ZZHC RX 250: Performed by: NURSE ANESTHETIST, CERTIFIED REGISTERED

## 2018-02-08 PROCEDURE — 88305 TISSUE EXAM BY PATHOLOGIST: CPT | Performed by: SURGERY

## 2018-02-08 PROCEDURE — 88342 IMHCHEM/IMCYTCHM 1ST ANTB: CPT | Mod: 26 | Performed by: SURGERY

## 2018-02-08 PROCEDURE — 88342 IMHCHEM/IMCYTCHM 1ST ANTB: CPT | Performed by: SURGERY

## 2018-02-08 PROCEDURE — 88305 TISSUE EXAM BY PATHOLOGIST: CPT | Mod: 26 | Performed by: SURGERY

## 2018-02-08 RX ORDER — NYSTATIN 100000/ML
500000 SUSPENSION, ORAL (FINAL DOSE FORM) ORAL 4 TIMES DAILY
Qty: 60 ML | Refills: 0 | Status: SHIPPED | OUTPATIENT
Start: 2018-02-08 | End: 2018-03-16

## 2018-02-08 RX ORDER — LIDOCAINE 40 MG/G
CREAM TOPICAL
Status: DISCONTINUED | OUTPATIENT
Start: 2018-02-08 | End: 2018-02-08 | Stop reason: HOSPADM

## 2018-02-08 RX ORDER — LIDOCAINE HYDROCHLORIDE 10 MG/ML
INJECTION, SOLUTION INFILTRATION; PERINEURAL PRN
Status: DISCONTINUED | OUTPATIENT
Start: 2018-02-08 | End: 2018-02-08

## 2018-02-08 RX ORDER — SODIUM CHLORIDE, SODIUM LACTATE, POTASSIUM CHLORIDE, CALCIUM CHLORIDE 600; 310; 30; 20 MG/100ML; MG/100ML; MG/100ML; MG/100ML
INJECTION, SOLUTION INTRAVENOUS CONTINUOUS
Status: DISCONTINUED | OUTPATIENT
Start: 2018-02-08 | End: 2018-02-08 | Stop reason: HOSPADM

## 2018-02-08 RX ORDER — SIMETHICONE 40MG/0.6ML
SUSPENSION, DROPS(FINAL DOSAGE FORM)(ML) ORAL PRN
Status: DISCONTINUED | OUTPATIENT
Start: 2018-02-08 | End: 2018-02-08 | Stop reason: HOSPADM

## 2018-02-08 RX ORDER — ONDANSETRON 2 MG/ML
4 INJECTION INTRAMUSCULAR; INTRAVENOUS
Status: DISCONTINUED | OUTPATIENT
Start: 2018-02-08 | End: 2018-02-08 | Stop reason: HOSPADM

## 2018-02-08 RX ORDER — PROPOFOL 10 MG/ML
INJECTION, EMULSION INTRAVENOUS PRN
Status: DISCONTINUED | OUTPATIENT
Start: 2018-02-08 | End: 2018-02-08

## 2018-02-08 RX ADMIN — SIMETHICONE 20 MG: 20 SUSPENSION/ DROPS ORAL at 13:16

## 2018-02-08 RX ADMIN — LIDOCAINE HYDROCHLORIDE 5 ML: 10 INJECTION, SOLUTION EPIDURAL; INFILTRATION; INTRACAUDAL; PERINEURAL at 12:59

## 2018-02-08 RX ADMIN — PROPOFOL 100 MG: 10 INJECTION, EMULSION INTRAVENOUS at 13:17

## 2018-02-08 RX ADMIN — PROPOFOL 100 MG: 10 INJECTION, EMULSION INTRAVENOUS at 13:20

## 2018-02-08 RX ADMIN — SODIUM CHLORIDE, POTASSIUM CHLORIDE, SODIUM LACTATE AND CALCIUM CHLORIDE: 600; 310; 30; 20 INJECTION, SOLUTION INTRAVENOUS at 12:59

## 2018-02-08 RX ADMIN — TOPICAL ANESTHETIC 1 SPRAY: 200 SPRAY DENTAL; PERIODONTAL at 13:16

## 2018-02-08 NOTE — ANESTHESIA POSTPROCEDURE EVALUATION
Patient: Abhijeet Mccauley    Procedure(s):  gastroscopy with biopsies - Wound Class: II-Clean Contaminated    Diagnosis:secondary esophageal varices with bleeding  diagnostic  Diagnosis Additional Information: No value filed.    Anesthesia Type:  MAC    Note:  Anesthesia Post Evaluation    Patient location during evaluation: Bedside  Patient participation: Able to fully participate in evaluation  Level of consciousness: awake and alert  Pain management: adequate  Airway patency: patent  Cardiovascular status: acceptable  Respiratory status: acceptable  Hydration status: acceptable  PONV: none     Anesthetic complications: None          Last vitals:  Vitals:    02/08/18 1233   BP: 141/68   Pulse: 71   Resp: 20   Temp: 37.1  C (98.8  F)   SpO2: 98%         Electronically Signed By: LOPEZ Montes CRNA  February 8, 2018  1:29 PM

## 2018-02-08 NOTE — ANESTHESIA CARE TRANSFER NOTE
Patient: Abhijeet Mccauley    Procedure(s):  gastroscopy with biopsies - Wound Class: II-Clean Contaminated    Diagnosis: secondary esophageal varices with bleeding  diagnostic  Diagnosis Additional Information: No value filed.    Anesthesia Type:   MAC     Note:  Airway :Room Air  Patient transferred to:Phase II  Handoff Report: Identifed the Patient, Identified the Reponsible Provider, Reviewed the pertinent medical history, Discussed the surgical course, Reviewed Intra-OP anesthesia mangement and issues during anesthesia, Set expectations for post-procedure period and Allowed opportunity for questions and acknowledgement of understanding      Vitals: (Last set prior to Anesthesia Care Transfer)    CRNA VITALS  2/8/2018 1258 - 2/8/2018 1329      2/8/2018             Pulse: 71    SpO2: 99 %                Electronically Signed By: LOPEZ Montes CRNA  February 8, 2018  1:29 PM

## 2018-02-13 LAB — COPATH REPORT: NORMAL

## 2018-02-21 ENCOUNTER — OFFICE VISIT (OUTPATIENT)
Dept: FAMILY MEDICINE | Facility: CLINIC | Age: 58
End: 2018-02-21
Payer: COMMERCIAL

## 2018-02-21 VITALS
OXYGEN SATURATION: 99 % | HEART RATE: 68 BPM | DIASTOLIC BLOOD PRESSURE: 60 MMHG | HEIGHT: 66 IN | BODY MASS INDEX: 30.22 KG/M2 | TEMPERATURE: 98.4 F | SYSTOLIC BLOOD PRESSURE: 130 MMHG | WEIGHT: 188 LBS

## 2018-02-21 DIAGNOSIS — K25.3 ACUTE GASTRIC ULCER WITHOUT HEMORRHAGE OR PERFORATION: Primary | ICD-10-CM

## 2018-02-21 DIAGNOSIS — B37.81 CANDIDIASIS OF ESOPHAGUS (H): ICD-10-CM

## 2018-02-21 PROCEDURE — 99214 OFFICE O/P EST MOD 30 MIN: CPT | Performed by: FAMILY MEDICINE

## 2018-02-21 NOTE — PROGRESS NOTES
SUBJECTIVE:   Abhijeet Mccauley is a 57 year old male who presents to clinic today for the following health issues:      ENDOSCOPY:  Here for a follow up on Upper GI Endoscopy testing done on 2-8-18.  Biopsy was also done.    Clinic visit on 1-24-18 with Dr. Valenzuela for a post hospital follow up from [facility] [place], 1-10-1-13-18, Internal bleeding.  He took the Nystatin about three times daily for one week.                                                     Impression:          - Normal nasopharynx and oropharynx.                        - Moderately severe candidiasis esophagitis. Biopsied.                        - Non-bleeding gastric ulcers with no stigmata of                        bleeding. Biopsied.                        - Normal examined duodenum.   Recommendation:      - Discharge patient to home.                        - Await pathology results.                        - Return to referring physician at the next available                        appointment.                        - Nystatin suspension 400,000 units PO QID.     Pathology showed: ]  SPECIMEN(S):   A: Antral biopsy   B: Esophageal biopsy     FINAL DIAGNOSIS:   A. Antral biopsy:   - Active chronic gastritis.   - Negative for Helicobacter pylori on H&E and immunohistochemical stained   sections.     B. Esophageal biopsy:   - Acute esophagitis with Candidiasis.                                                                                         PAIN MEDICATION:  He is wanting to know if there something other than Tylenol and Aleve to take?  States those medications cause stomach issues.      Current Outpatient Prescriptions:      nystatin (MYCOSTATIN) 544016 UNIT/ML suspension, Take 5 mLs (500,000 Units) by mouth 4 times daily, Disp: 60 mL, Rfl: 0     propranolol (INDERAL) 20 MG tablet, Take 1 tablet (20 mg) by mouth 2 times daily, Disp: 90 tablet, Rfl: 3     benzocaine-menthol (CEPACOL) 15-3.6 MG lozenge, Take 1 lozenge by mouth, Disp: , Rfl:       lactulose (CHRONULAC) 10 GM/15ML solution, Take 6.7 g by mouth, Disp: , Rfl:      mometasone-formoterol (DULERA) 200-5 MCG/ACT oral inhaler, Inhale 2 puffs into the lungs, Disp: , Rfl:      tiotropium (SPIRIVA) 18 MCG capsule, Inhale 1 capsule into the lungs, Disp: , Rfl:      furosemide (LASIX) 20 MG tablet, Take 20 mg by mouth daily , Disp: , Rfl:      sodium bicarbonate 650 MG tablet, Take 1 tablet by mouth, Disp: , Rfl:      potassium chloride SA (K-DUR/KLOR-CON M) 10 MEQ CR tablet, Take 1 tablet (10 mEq) by mouth daily, Disp: , Rfl:      pantoprazole sodium (PROTONIX) 40 MG packet, Take 1 packet (40 mg) by mouth every morning (before breakfast), Disp: 30 packet, Rfl: 11     albuterol (PROAIR HFA/PROVENTIL HFA/VENTOLIN HFA) 108 (90 BASE) MCG/ACT Inhaler, Inhale 2 puffs into the lungs every 4 hours as needed, Disp: 1 Inhaler, Rfl: 11     desonide (DESOWEN) 0.05 % lotion, Apply topically as needed, Disp: 118 mL, Rfl: 1     Melatonin ER 5 MG TBCR, Take 1 tablet by mouth At Bedtime, Disp: , Rfl:      SODIUM BICARBONATE PO, Take 650 mg by mouth daily as needed , Disp: , Rfl:      TRAZODONE HCL PO, Take 50 mg by mouth At Bedtime , Disp: , Rfl:      loratadine (CLARITIN) 10 MG tablet, Take 1 tablet by mouth daily as needed for allergies., Disp: 30 tablet, Rfl: 6     acetaminophen (TYLENOL) 500 MG tablet, Take 1-2 tablets by mouth every 6 hours as needed., Disp: , Rfl:      naproxen sodium (ANAPROX) 220 MG tablet, Take 220 mg by mouth 2 times daily (with meals)., Disp: , Rfl:     Patient Active Problem List   Diagnosis     Essential hypertension     Acute myeloid leukemia in remission (H)     Sensorineural hearing loss, asymmetrical     Alcoholic cirrhosis of liver (H)     Acute alcoholic hepatitis     Coagulation defect (H)     Osteoarthrosis     Hemorrhage of gastrointestinal tract     Thrombocytopenia (H)     Universal ulcerative (chronic) colitis(556.6) (H)     CKD (chronic kidney disease) stage 3, GFR 30-59  "ml/min     Rosacea     Mild intermittent asthma without complication       Blood pressure 130/60, pulse 68, temperature 98.4  F (36.9  C), temperature source Oral, height 5' 6\" (1.676 m), weight 188 lb (85.3 kg), SpO2 99 %.  ]  Exam:  GENERAL APPEARANCE: healthy, alert and no distress  EYES: EOMI,  PERRL  ABDOMEN:  soft, nontender, no HSM or masses and bowel sounds normal  PSYCH: mentation appears normal and affect normal/bright    (K25.3) Acute gastric ulcer without hemorrhage or perforation  (primary encounter diagnosis)  Comment:   Plan: UPPER GI ENDOSCOPY        We discussed the endoscopy results and recommendations. Stop taking aspirin and advil and aleve. Use only Tylenol. Take no spicy food or alcohol or caffeine. Eat softer food for a few days.   For the use of the Protonix, you have been taking 40 mg daily but increase that to 40 mg twice daily for 2-3 weeks to let the ulcer heal. We will order the upper endoscopy for the future, about in May if doing well.   If doing well then reduce the Protonix to 40 mg daily and stay on that indefinitely. Monitor the stools for changes. Avoid Pepto Bismol and licorice and beets. Reducing tobacco will help and quitting is the goal. Call [phone] to schedule the endoscopy.     (F64.58) Candidiasis of esophagus (H)  Comment:   Plan: the treatment is done and you are doing well. Monitor for the symptoms of recurrence.       Parrish Funk"

## 2018-02-21 NOTE — MR AVS SNAPSHOT
After Visit Summary   2/21/2018    Abhijeet Mccauley    MRN: [id number]           Patient Information     Date Of Birth          1960        Visit Information        Provider Department      2/21/2018 1:40 PM Parrish Funk MD [facility]        Today's Diagnoses     Acute gastric ulcer without hemorrhage or perforation    -  1    Candidiasis of esophagus (H)          Care Instructions          Thank you for choosing [facility].  You may be receiving a survey in the mail from [facility] regarding your visit today.  Please take a few minutes to complete and return the survey to let us know how we are doing.      If you have questions or concerns, please contact us via Your Tribute or you can contact your care team at [phone].    Our Clinic hours are:  Monday 6:40 am  to 7:00 pm  Tuesday -Friday 6:40 am to 5:00 pm    The [place] outpatient lab hours are:  Monday - Friday 6:10 am to 4:45 pm  Saturdays 7:00 am to 11:00 am  Appointments are required, call [phone]    If you have clinical questions after hours or would like to schedule an appointment,  call the clinic at [phone].      (K25.3) Acute gastric ulcer without hemorrhage or perforation  (primary encounter diagnosis)  Comment:   Plan: UPPER GI ENDOSCOPY        We discussed the endoscopy results and recommendations. Stop taking aspirin and advil and aleve. Use only Tylenol. Take no spicy food or alcohol or caffeine. Eat softer food for a few days.   For the use of the Protonix, you have been taking 40 mg daily but increase that to 40 mg twice daily for 2-3 weeks to let the ulcer heal. We will order the upper endoscopy for the future, about in May if doing well.   If doing well then reduce the Protonix to 40 mg daily and stay on that indefinitely. Monitor the stools for changes. Avoid Pepto Bismol and licorice and beets. Reducing tobacco will help and quitting is the goal. Call [phone] to schedule the  "endoscopy.     (B37.81) Candidiasis of esophagus (H)  Comment:   Plan: the treatment is done and you are doing well. Monitor for the symptoms of recurrence.             Follow-ups after your visit        Future tests that were ordered for you today     Open Future Orders        Priority Expected Expires Ordered    UPPER GI ENDOSCOPY Routine 2018            Who to contact     If you have questions or need follow up information about today's clinic visit or your schedule please contact [facility] directly at [phone].  Normal or non-critical lab and imaging results will be communicated to you by "[employer]hart, letter or phone within 4 business days after the clinic has received the results. If you do not hear from us within 7 days, please contact the clinic through tapvivat or phone. If you have a critical or abnormal lab result, we will notify you by phone as soon as possible.  Submit refill requests through natue or call your pharmacy and they will forward the refill request to us. Please allow 3 business days for your refill to be completed.          Additional Information About Your Visit        natue Information     natue lets you send messages to your doctor, view your test results, renew your prescriptions, schedule appointments and more. To sign up, go to [email or web address] . Click on \"Log in\" on the left side of the screen, which will take you to the Welcome page. Then click on \"Sign up Now\" on the right side of the page.     You will be asked to enter the access code listed below, as well as some personal information. Please follow the directions to create your username and password.     Your access code is: [email or web address]  Expires: 2018  3:54 PM     Your access code will  in 90 days. If you need help or a new code, please call your [facility] or [phone].        Care EveryWhere ID     This is your Care EveryWhere ID. This could be used by other " "organizations to access your [facility] medical records  [id number]        Your Vitals Were     Pulse Temperature Height Pulse Oximetry BMI (Body Mass Index)       68 98.4  F (36.9  C) (Oral) 5' 6\" (1.676 m) 99% 30.34 kg/m2        Blood Pressure from Last 3 Encounters:   02/21/18 130/60   02/08/18 124/66   01/24/18 129/58    Weight from Last 3 Encounters:   02/21/18 188 lb (85.3 kg)   02/08/18 190 lb (86.2 kg)   01/24/18 183 lb 4.8 oz (83.1 kg)               Primary Care Provider Office Phone # Fax #    Parrish Funk [phone] [address] [facility] [address]        Equal Access to Services     JORGE COOL : Ashwin hamilton Soemily, waaxda luqadaha, qaybta kaalmada ir[place], song chowdhury . So [facility] [phone].    ATENCIÓN: Si habla español, tiene a uriostegui disposición servicios gratuitos de asistencia lingüística. Claudy al [phone].    We comply with applicable federal civil rights laws and [place] laws. We do not discriminate on the basis of race, color, national origin, age, disability, sex, sexual orientation, or gender identity.            Thank you!     Thank you for choosing [facility]  for your care. Our goal is always to provide you with excellent care. Hearing back from our patients is one way we can continue to improve our services. Please take a few minutes to complete the written survey that you may receive in the mail after your visit with us. Thank you!             Your Updated Medication List - Protect others around you: Learn how to safely use, store and throw away your medicines at www.disposemymeds.org.          This list is accurate as of 2/21/18  2:10 PM.  Always use your most recent med list.                   Brand Name Dispense Instructions for use Diagnosis    acetaminophen 500 MG tablet    TYLENOL     Take 1-2 tablets by mouth every 6 hours as needed.        albuterol 108 (90 BASE) MCG/ACT Inhaler    " PROAIR HFA/PROVENTIL HFA/VENTOLIN HFA    1 Inhaler    Inhale 2 puffs into the lungs every 4 hours as needed    Mild intermittent asthma without complication       benzocaine-menthol 15-3.6 MG lozenge    CEPACOL     Take 1 lozenge by mouth        desonide 0.05 % lotion    DESOWEN    118 mL    Apply topically as needed    Rosacea       furosemide 20 MG tablet    LASIX     Take 20 mg by mouth daily        lactulose 10 GM/15ML solution    CHRONULAC     Take 6.7 g by mouth        loratadine 10 MG tablet    CLARITIN    30 tablet    Take 1 tablet by mouth daily as needed for allergies.    Allergic rhinitis       Melatonin ER 5 MG Tbcr      Take 1 tablet by mouth At Bedtime        mometasone-formoterol 200-5 MCG/ACT oral inhaler    DULERA     Inhale 2 puffs into the lungs        naproxen sodium 220 MG tablet    ANAPROX     Take 220 mg by mouth 2 times daily (with meals).        nystatin 397721 UNIT/ML suspension    MYCOSTATIN    60 mL    Take 5 mLs (500,000 Units) by mouth 4 times daily    Candida infection       pantoprazole sodium 40 MG packet    PROTONIX    30 packet    Take 1 packet (40 mg) by mouth every morning (before breakfast)    Gastroesophageal reflux disease without esophagitis       potassium chloride SA 10 MEQ CR tablet    K-DUR/KLOR-CON M     Take 1 tablet (10 mEq) by mouth daily    Gastroesophageal reflux disease without esophagitis       propranolol 20 MG tablet    INDERAL    90 tablet    Take 1 tablet (20 mg) by mouth 2 times daily    Gastroesophageal reflux disease without esophagitis       * SODIUM BICARBONATE PO      Take 650 mg by mouth daily as needed        * sodium bicarbonate 650 MG tablet      Take 1 tablet by mouth        tiotropium 18 MCG capsule    SPIRIVA     Inhale 1 capsule into the lungs        TRAZODONE HCL PO      Take 50 mg by mouth At Bedtime        * Notice:  This list has 2 medication(s) that are the same as other medications prescribed for you. Read the directions carefully, and ask  your doctor or other care provider to review them with you.

## 2018-02-21 NOTE — PATIENT INSTRUCTIONS
Thank you for choosing [facility].  You may be receiving a survey in the mail from [facility] regarding your visit today.  Please take a few minutes to complete and return the survey to let us know how we are doing.      If you have questions or concerns, please contact us via Quisic or you can contact your care team at [phone].    Our Clinic hours are:  Monday 6:40 am  to 7:00 pm  Tuesday -Friday 6:40 am to 5:00 pm    The [place] outpatient lab hours are:  Monday - Friday 6:10 am to 4:45 pm  Saturdays 7:00 am to 11:00 am  Appointments are required, call [phone]    If you have clinical questions after hours or would like to schedule an appointment,  call the clinic at [phone].      (K25.3) Acute gastric ulcer without hemorrhage or perforation  (primary encounter diagnosis)  Comment:   Plan: UPPER GI ENDOSCOPY        We discussed the endoscopy results and recommendations. Stop taking aspirin and advil and aleve. Use only Tylenol. Take no spicy food or alcohol or caffeine. Eat softer food for a few days.   For the use of the Protonix, you have been taking 40 mg daily but increase that to 40 mg twice daily for 2-3 weeks to let the ulcer heal. We will order the upper endoscopy for the future, about in May if doing well.   If doing well then reduce the Protonix to 40 mg daily and stay on that indefinitely. Monitor the stools for changes. Avoid Pepto Bismol and licorice and beets. Reducing tobacco will help and quitting is the goal. Call [phone] to schedule the endoscopy.     (B37.81) Candidiasis of esophagus (H)  Comment:   Plan: the treatment is done and you are doing well. Monitor for the symptoms of recurrence.

## 2018-02-21 NOTE — NURSING NOTE
"Chief Complaint   Patient presents with     Endocopy     Follow up on endoscopy testing done on 2-8-18.       Initial /60  Pulse 68  Temp 98.4  F (36.9  C) (Oral)  Ht 5' 6\" (1.676 m)  Wt 188 lb (85.3 kg)  SpO2 99%  BMI 30.34 kg/m2 Estimated body mass index is 30.34 kg/(m^2) as calculated from the following:    Height as of this encounter: 5' 6\" (1.676 m).    Weight as of this encounter: 188 lb (85.3 kg).  Medication Reconciliation: complete  "

## 2018-02-27 ENCOUNTER — TELEPHONE (OUTPATIENT)
Dept: FAMILY MEDICINE | Facility: CLINIC | Age: 58
End: 2018-02-27

## 2018-02-27 DIAGNOSIS — D50.9 IRON DEFICIENCY ANEMIA, UNSPECIFIED IRON DEFICIENCY ANEMIA TYPE: Primary | ICD-10-CM

## 2018-02-27 NOTE — TELEPHONE ENCOUNTER
Reason for Call:  Other referral    Detailed comments: Patient states he was in the hospital for internal bleeding  but now is ok.  He needs a tooth pulled and the [facility] is requesting a referral or something that states it is ok the extract the tooth.    Phone Number Patient can be reached at: Cell number on file:    Telephone Information:   Mobile [phone]       Best Time: any    Can we leave a detailed message on this number? YES    Call taken on 2/27/2018 at 9:44 AM by Aye Lilly

## 2018-02-28 NOTE — TELEPHONE ENCOUNTER
"Notified him, \"I thought we did all that , ok, oh well, I will have to call back and come in next week sometime then for the blood draw, Ok, thanks, \"    He declined to schedule lab only visit note.     I will postpone this message until next week, to be sure he comes in and the letter he needs is taken care of after Hgb done.   Tiffani Pemberton RNC    "

## 2018-02-28 NOTE — TELEPHONE ENCOUNTER
I ordered the Hgb to be done. Please help him to do this before we write the letter, to be certain there is no more blood loss.   We will do the letter if the Hgb is still at 9.9 or is higher. Parrish Funk

## 2018-02-28 NOTE — TELEPHONE ENCOUNTER
Pt calling stating the dentist won't schedule anything until they get this letter from the doctor.     He is wondering if we can do this soon. Please.    Neisha [place]union  [facility] [place]

## 2018-03-16 ENCOUNTER — NURSE TRIAGE (OUTPATIENT)
Dept: NURSING | Facility: CLINIC | Age: 58
End: 2018-03-16

## 2018-03-16 ENCOUNTER — HOSPITAL ENCOUNTER (INPATIENT)
Facility: CLINIC | Age: 58
LOS: 2 days | Discharge: HOME | DRG: 378 | End: 2018-03-18
Attending: EMERGENCY MEDICINE | Admitting: FAMILY MEDICINE
Payer: COMMERCIAL

## 2018-03-16 DIAGNOSIS — Z86.711 PERSONAL HISTORY OF PE (PULMONARY EMBOLISM): ICD-10-CM

## 2018-03-16 DIAGNOSIS — K92.2 ACUTE GASTROINTESTINAL HEMORRHAGE: ICD-10-CM

## 2018-03-16 DIAGNOSIS — Z85.6 PERSONAL HISTORY OF LYMPHOID LEUKEMIA: ICD-10-CM

## 2018-03-16 DIAGNOSIS — F10.20 ACUTE ALCOHOLISM (H): ICD-10-CM

## 2018-03-16 DIAGNOSIS — N30.00 ACUTE CYSTITIS WITHOUT HEMATURIA: Primary | ICD-10-CM

## 2018-03-16 DIAGNOSIS — K92.2 GASTROINTESTINAL HEMORRHAGE, UNSPECIFIED GASTROINTESTINAL HEMORRHAGE TYPE: ICD-10-CM

## 2018-03-16 DIAGNOSIS — F17.210 CIGARETTE SMOKER: ICD-10-CM

## 2018-03-16 DIAGNOSIS — I10 ESSENTIAL HYPERTENSION, MALIGNANT: ICD-10-CM

## 2018-03-16 PROBLEM — K27.9 PEPTIC ULCER DISEASE: Status: ACTIVE | Noted: 2018-03-16

## 2018-03-16 LAB
ALBUMIN SERPL-MCNC: 3 G/DL (ref 3.4–5)
ALP SERPL-CCNC: 247 U/L (ref 40–150)
ALT SERPL W P-5'-P-CCNC: 23 U/L (ref 0–70)
ANION GAP SERPL CALCULATED.3IONS-SCNC: 12 MMOL/L (ref 3–14)
APTT PPP: 33 SEC (ref 22–37)
AST SERPL W P-5'-P-CCNC: 35 U/L (ref 0–45)
BASOPHILS # BLD AUTO: 0 10E9/L (ref 0–0.2)
BASOPHILS NFR BLD AUTO: 0.3 %
BILIRUB SERPL-MCNC: 1.8 MG/DL (ref 0.2–1.3)
BUN SERPL-MCNC: 18 MG/DL (ref 7–30)
CALCIUM SERPL-MCNC: 8.2 MG/DL (ref 8.5–10.1)
CHLORIDE SERPL-SCNC: 108 MMOL/L (ref 94–109)
CO2 SERPL-SCNC: 19 MMOL/L (ref 20–32)
CREAT SERPL-MCNC: 1.8 MG/DL (ref 0.66–1.25)
DIFFERENTIAL METHOD BLD: ABNORMAL
EOSINOPHIL # BLD AUTO: 0.3 10E9/L (ref 0–0.7)
EOSINOPHIL NFR BLD AUTO: 1.8 %
ERYTHROCYTE [DISTWIDTH] IN BLOOD BY AUTOMATED COUNT: 19.1 % (ref 10–15)
ETHANOL SERPL-MCNC: 0.04 G/DL
GFR SERPL CREATININE-BSD FRML MDRD: 39 ML/MIN/1.7M2
GLUCOSE SERPL-MCNC: 81 MG/DL (ref 70–99)
HCT VFR BLD AUTO: 24.1 % (ref 40–53)
HEMOCCULT STL QL: POSITIVE
HGB BLD-MCNC: 6.6 G/DL (ref 13.3–17.7)
HGB BLD-MCNC: 7.4 G/DL (ref 13.3–17.7)
IMM GRANULOCYTES # BLD: 0.1 10E9/L (ref 0–0.4)
IMM GRANULOCYTES NFR BLD: 0.3 %
INR PPP: 1.34 (ref 0.86–1.14)
INTERNAL QC OK POCT: YES
LACTATE BLD-SCNC: 1.5 MMOL/L (ref 0.7–2)
LIPASE SERPL-CCNC: 177 U/L (ref 73–393)
LYMPHOCYTES # BLD AUTO: 1.5 10E9/L (ref 0.8–5.3)
LYMPHOCYTES NFR BLD AUTO: 10.5 %
MCH RBC QN AUTO: 26.1 PG (ref 26.5–33)
MCHC RBC AUTO-ENTMCNC: 30.7 G/DL (ref 31.5–36.5)
MCV RBC AUTO: 85 FL (ref 78–100)
MONOCYTES # BLD AUTO: 2.7 10E9/L (ref 0–1.3)
MONOCYTES NFR BLD AUTO: 18.4 %
NEUTROPHILS # BLD AUTO: 10 10E9/L (ref 1.6–8.3)
NEUTROPHILS NFR BLD AUTO: 68.7 %
PLATELET # BLD AUTO: 179 10E9/L (ref 150–450)
POTASSIUM SERPL-SCNC: 4 MMOL/L (ref 3.4–5.3)
PROT SERPL-MCNC: 7.3 G/DL (ref 6.8–8.8)
RBC # BLD AUTO: 2.84 10E12/L (ref 4.4–5.9)
SODIUM SERPL-SCNC: 139 MMOL/L (ref 133–144)
TEST CARD LOT NUMBER: ABNORMAL
WBC # BLD AUTO: 14.6 10E9/L (ref 4–11)

## 2018-03-16 PROCEDURE — 80320 DRUG SCREEN QUANTALCOHOLS: CPT | Performed by: EMERGENCY MEDICINE

## 2018-03-16 PROCEDURE — 25000128 H RX IP 250 OP 636: Performed by: EMERGENCY MEDICINE

## 2018-03-16 PROCEDURE — 99285 EMERGENCY DEPT VISIT HI MDM: CPT | Mod: 25 | Performed by: EMERGENCY MEDICINE

## 2018-03-16 PROCEDURE — 85610 PROTHROMBIN TIME: CPT | Performed by: EMERGENCY MEDICINE

## 2018-03-16 PROCEDURE — 85730 THROMBOPLASTIN TIME PARTIAL: CPT | Performed by: EMERGENCY MEDICINE

## 2018-03-16 PROCEDURE — 83605 ASSAY OF LACTIC ACID: CPT | Performed by: FAMILY MEDICINE

## 2018-03-16 PROCEDURE — 86900 BLOOD TYPING SEROLOGIC ABO: CPT | Performed by: EMERGENCY MEDICINE

## 2018-03-16 PROCEDURE — 86901 BLOOD TYPING SEROLOGIC RH(D): CPT | Performed by: EMERGENCY MEDICINE

## 2018-03-16 PROCEDURE — 86870 RBC ANTIBODY IDENTIFICATION: CPT | Performed by: EMERGENCY MEDICINE

## 2018-03-16 PROCEDURE — 99223 1ST HOSP IP/OBS HIGH 75: CPT | Mod: AI | Performed by: PHYSICIAN ASSISTANT

## 2018-03-16 PROCEDURE — 82272 OCCULT BLD FECES 1-3 TESTS: CPT | Performed by: EMERGENCY MEDICINE

## 2018-03-16 PROCEDURE — 12000007 ZZH R&B INTERMEDIATE

## 2018-03-16 PROCEDURE — 96361 HYDRATE IV INFUSION ADD-ON: CPT | Performed by: EMERGENCY MEDICINE

## 2018-03-16 PROCEDURE — 99222 1ST HOSP IP/OBS MODERATE 55: CPT | Performed by: SURGERY

## 2018-03-16 PROCEDURE — 85018 HEMOGLOBIN: CPT | Performed by: PHYSICIAN ASSISTANT

## 2018-03-16 PROCEDURE — 25000125 ZZHC RX 250: Performed by: EMERGENCY MEDICINE

## 2018-03-16 PROCEDURE — 86850 RBC ANTIBODY SCREEN: CPT | Performed by: EMERGENCY MEDICINE

## 2018-03-16 PROCEDURE — 25000132 ZZH RX MED GY IP 250 OP 250 PS 637: Performed by: PHYSICIAN ASSISTANT

## 2018-03-16 PROCEDURE — 36415 COLL VENOUS BLD VENIPUNCTURE: CPT | Performed by: FAMILY MEDICINE

## 2018-03-16 PROCEDURE — 85025 COMPLETE CBC W/AUTO DIFF WBC: CPT | Performed by: EMERGENCY MEDICINE

## 2018-03-16 PROCEDURE — 86922 COMPATIBILITY TEST ANTIGLOB: CPT | Performed by: EMERGENCY MEDICINE

## 2018-03-16 PROCEDURE — 80053 COMPREHEN METABOLIC PANEL: CPT | Performed by: EMERGENCY MEDICINE

## 2018-03-16 PROCEDURE — 83690 ASSAY OF LIPASE: CPT | Performed by: EMERGENCY MEDICINE

## 2018-03-16 PROCEDURE — 96374 THER/PROPH/DIAG INJ IV PUSH: CPT | Performed by: EMERGENCY MEDICINE

## 2018-03-16 RX ORDER — PROPRANOLOL HYDROCHLORIDE 20 MG/1
20 TABLET ORAL 2 TIMES DAILY
Status: DISCONTINUED | OUTPATIENT
Start: 2018-03-16 | End: 2018-03-18 | Stop reason: HOSPADM

## 2018-03-16 RX ORDER — ALBUTEROL SULFATE 0.83 MG/ML
2.5 SOLUTION RESPIRATORY (INHALATION) EVERY 6 HOURS PRN
Status: DISCONTINUED | OUTPATIENT
Start: 2018-03-16 | End: 2018-03-18 | Stop reason: HOSPADM

## 2018-03-16 RX ORDER — SODIUM CHLORIDE 9 MG/ML
INJECTION, SOLUTION INTRAVENOUS CONTINUOUS
Status: DISCONTINUED | OUTPATIENT
Start: 2018-03-16 | End: 2018-03-18 | Stop reason: HOSPADM

## 2018-03-16 RX ORDER — MAGNESIUM CARB/ALUMINUM HYDROX 105-160MG
296 TABLET,CHEWABLE ORAL ONCE
Status: COMPLETED | OUTPATIENT
Start: 2018-03-18 | End: 2018-03-18

## 2018-03-16 RX ORDER — DOCUSATE SODIUM 100 MG/1
200 CAPSULE, LIQUID FILLED ORAL ONCE
Status: COMPLETED | OUTPATIENT
Start: 2018-03-17 | End: 2018-03-17

## 2018-03-16 RX ORDER — NALOXONE HYDROCHLORIDE 0.4 MG/ML
.1-.4 INJECTION, SOLUTION INTRAMUSCULAR; INTRAVENOUS; SUBCUTANEOUS
Status: DISCONTINUED | OUTPATIENT
Start: 2018-03-16 | End: 2018-03-18 | Stop reason: HOSPADM

## 2018-03-16 RX ORDER — LORAZEPAM 2 MG/ML
1-2 INJECTION INTRAMUSCULAR EVERY 30 MIN PRN
Status: DISCONTINUED | OUTPATIENT
Start: 2018-03-16 | End: 2018-03-18 | Stop reason: HOSPADM

## 2018-03-16 RX ORDER — POLYETHYLENE GLYCOL 3350 17 G/17G
238 POWDER, FOR SOLUTION ORAL ONCE
Status: COMPLETED | OUTPATIENT
Start: 2018-03-17 | End: 2018-03-17

## 2018-03-16 RX ORDER — TRAZODONE HYDROCHLORIDE 50 MG/1
50 TABLET, FILM COATED ORAL AT BEDTIME
Status: DISCONTINUED | OUTPATIENT
Start: 2018-03-16 | End: 2018-03-18 | Stop reason: HOSPADM

## 2018-03-16 RX ORDER — LORAZEPAM 1 MG/1
1-2 TABLET ORAL EVERY 30 MIN PRN
Status: DISCONTINUED | OUTPATIENT
Start: 2018-03-16 | End: 2018-03-18 | Stop reason: HOSPADM

## 2018-03-16 RX ADMIN — Medication 5 MG: at 22:39

## 2018-03-16 RX ADMIN — TRAZODONE HYDROCHLORIDE 50 MG: 50 TABLET ORAL at 22:39

## 2018-03-16 RX ADMIN — SODIUM CHLORIDE 1000 ML: 9 INJECTION, SOLUTION INTRAVENOUS at 16:08

## 2018-03-16 RX ADMIN — PANTOPRAZOLE SODIUM 40 MG: 40 INJECTION, POWDER, FOR SOLUTION INTRAVENOUS at 16:08

## 2018-03-16 ASSESSMENT — ACTIVITIES OF DAILY LIVING (ADL)
BATHING: 0-->INDEPENDENT
FALL_HISTORY_WITHIN_LAST_SIX_MONTHS: NO
AMBULATION: 0-->INDEPENDENT
RETIRED_COMMUNICATION: 0-->UNDERSTANDS/COMMUNICATES WITHOUT DIFFICULTY
TRANSFERRING: 0-->INDEPENDENT
TOILETING: 0-->INDEPENDENT
SWALLOWING: 0-->SWALLOWS FOODS/LIQUIDS WITHOUT DIFFICULTY
COGNITION: 0 - NO COGNITION ISSUES REPORTED
DRESS: 0-->INDEPENDENT
RETIRED_EATING: 0-->INDEPENDENT

## 2018-03-16 NOTE — IP AVS SNAPSHOT
MRN:[id number]                      After Visit Summary   3/16/2018    Abhijeet Mccauley    MRN: [id number]           Thank you!     Thank you for choosing [facility] for your care. Our goal is always to provide you with excellent care. Hearing back from our patients is one way we can continue to improve our services. Please take a few minutes to complete the written survey that you may receive in the mail after you visit with us. Thank you!        Patient Information     Date Of Birth          1960        Designated Caregiver       Most Recent Value    Caregiver    Will someone help with your care after discharge? yes    Name of designated caregiver Angelina    Phone number of caregiver [phone]    Caregiver address same as patient      About your hospital stay     You were admitted on:  March 16, 2018 You last received care in the:  [facility]    You were discharged on:  March 18, 2018       Who to Call     For medical emergencies, please call 911.  For non-urgent questions about your medical care, please call your primary care provider or clinic, [phone]  For questions related to your surgery, please call your surgery clinic        Attending Provider     Provider Specialty    Dillon Sparks MD Emergency Medicine    [facility], Kristopher Avina MD Family Practice       Primary Care Provider Office Phone # Fax #    Parrish Kristopher Funk [phone]      Further instructions from your care team       Follow up with your primary doctor this coming week.  Take bactrim twice a day for urinary track infection.  Return if problems.    Pending Results     Date and Time Order Name Status Description    3/18/2018 0600 Direct antiglobulin test In process     3/18/2018 0000 Direct antiglobulin test In process     3/17/2018 0620 Urine Culture Aerobic Bacterial In process     3/17/2018 0542 Transfusion reaction evaluation In process     3/17/2018 0507 Blood culture  "Preliminary     3/17/2018 0507 Blood culture Preliminary             Statement of Approval     Ordered          18 1059  I have reviewed and agree with all the recommendations and orders detailed in this document.  EFFECTIVE NOW     Approved and electronically signed by:  Kristopher Nagy MD             Admission Information     Date & Time Provider Department Dept. Phone    3/16/2018 Kristopher Nagy MD [facility] [phone]      Your Vitals Were     Blood Pressure Pulse Temperature Respirations Height Weight    133/40 (BP Location: Right arm) 69 97.8  F (36.6  C) (Oral) 16 1.676 m (5' 6\") 83.9 kg (184 lb 15.5 oz)    Pulse Oximetry BMI (Body Mass Index)                99% 29.85 kg/m2          [employer] Information     [employer] lets you send messages to your doctor, view your test results, renew your prescriptions, schedule appointments and more. To sign up, go to [email or web address] . Click on \"Log in\" on the left side of the screen, which will take you to the Welcome page. Then click on \"Sign up Now\" on the right side of the page.     You will be asked to enter the access code listed below, as well as some personal information. Please follow the directions to create your username and password.     Your access code is: [email or web address]  Expires: 2018  4:54 PM     Your access code will  in 90 days. If you need help or a new code, please call your [place] clinic or [phone].        Care EveryWhere ID     This is your Care EveryWhere ID. This could be used by other organizations to access your [place] medical records  [id number]        Equal Access to Services     BERTO COOL : Hadii sameera Huddleston, maritza underwood, song leiva. So [facility] [phone].    ATENCIÓN: Si habla español, tiene a uriostegui disposición servicios gratuitos de asistencia lingüística. Llame al [phone].    We comply with " applicable federal civil rights laws and [place] laws. We do not discriminate on the basis of race, color, national origin, age, disability, sex, sexual orientation, or gender identity.               Review of your medicines      START taking        Dose / Directions    sulfamethoxazole-trimethoprim 800-160 MG per tablet   Commonly known as:  BACTRIM DS/SEPTRA DS   Used for:  Acute cystitis without hematuria        Dose:  1 tablet   Take 1 tablet by mouth 2 times daily   Quantity:  14 tablet   Refills:  0         CONTINUE these medicines which have NOT CHANGED        Dose / Directions    acetaminophen 500 MG tablet   Commonly known as:  TYLENOL        Dose:  1-2 tablet   Take 1-2 tablets by mouth every 6 hours as needed.   Refills:  0       albuterol 108 (90 BASE) MCG/ACT Inhaler   Commonly known as:  PROAIR HFA/PROVENTIL HFA/VENTOLIN HFA   Used for:  Mild intermittent asthma without complication        Dose:  2 puff   Inhale 2 puffs into the lungs every 4 hours as needed   Quantity:  1 Inhaler   Refills:  11       desonide 0.05 % lotion   Commonly known as:  DESOWEN   Used for:  Rosacea        Apply topically as needed   Quantity:  118 mL   Refills:  1       furosemide 20 MG tablet   Commonly known as:  LASIX        Dose:  20 mg   Take 20 mg by mouth every morning   Refills:  0       lactulose 10 GM/15ML solution   Commonly known as:  CHRONULAC        Dose:  6.7 g   Take 6.7 g by mouth 2 times daily   Refills:  0       loratadine 10 MG tablet   Commonly known as:  CLARITIN   Used for:  Allergic rhinitis        Dose:  10 mg   Take 1 tablet by mouth daily as needed for allergies.   Quantity:  30 tablet   Refills:  6       Melatonin ER 5 MG Tbcr        Dose:  1 tablet   Take 1 tablet by mouth At Bedtime   Refills:  0       mometasone-formoterol 200-5 MCG/ACT oral inhaler   Commonly known as:  DULERA        Dose:  2 puff   Inhale 2 puffs into the lungs 2 times daily   Refills:  0       PANTOPRAZOLE SODIUM PO         Dose:  40 mg   Take 40 mg by mouth every morning (before breakfast)   Refills:  0       potassium chloride SA 10 MEQ CR tablet   Commonly known as:  K-DUR/KLOR-CON M   Used for:  Gastroesophageal reflux disease without esophagitis        Dose:  10 mEq   Take 1 tablet (10 mEq) by mouth daily   Refills:  0       propranolol 20 MG tablet   Commonly known as:  INDERAL   Used for:  Gastroesophageal reflux disease without esophagitis        Dose:  20 mg   Take 1 tablet (20 mg) by mouth 2 times daily   Quantity:  90 tablet   Refills:  3       SODIUM BICARBONATE PO        Dose:  650 mg   Take 650 mg by mouth daily as needed   Refills:  0       tiotropium 18 MCG capsule   Commonly known as:  SPIRIVA        Dose:  1 capsule   Inhale 1 capsule into the lungs daily   Refills:  0       TRAZODONE HCL PO        Dose:  50 mg   Take 50 mg by mouth At Bedtime   Refills:  0            Where to get your medicines      These medications were sent to [facility] [address]  [address] [address]     Phone:  [phone]     sulfamethoxazole-trimethoprim 800-160 MG per tablet                Protect others around you: Learn how to safely use, store and throw away your medicines at www.disposemymeds.org.        ANTIBIOTIC INSTRUCTION     You've Been Prescribed an Antibiotic - Now What?  Your healthcare team thinks that you or your loved one might have an infection. Some infections can be treated with antibiotics, which are powerful, life-saving drugs. Like all medications, antibiotics have side effects and should only be used when necessary. There are some important things you should know about your antibiotic treatment.      Your healthcare team may run tests before you start taking an antibiotic.    Your team may take samples (e.g., from your blood, urine or other areas) to run tests to look for bacteria. These test can be important to determine if you need an antibiotic at all and, if  you do, which antibiotic will work best.      Within a few days, your healthcare team might change or even stop your antibiotic.    Your team may start you on an antibiotic while they are working to find out what is making you sick.    Your team might change your antibiotic because test results show that a different antibiotic would be better to treat your infection.    In some cases, once your team has more information, they learn that you do not need an antibiotic at all. They may find out that you don't have an infection, or that the antibiotic you're taking won't work against your infection. For example, an infection caused by a virus can't be treated with antibiotics. Staying on an antibiotic when you don't need it is more likely to be harmful than helpful.      You may experience side effects from your antibiotic.    Like all medications, antibiotics have side effects. Some of these can be serious.    Let you healthcare team know if you have any known allergies when you are admitted to the hospital.    One significant side effect of nearly all antibiotics is the risk of severe and sometimes deadly diarrhea caused by Clostridium difficile (C. Difficile). This occurs when a person takes antibiotics because some good germs are destroyed. Antibiotic use allows C. diificile to take over, putting patients at high risk for this serious infection.    As a patient or caregiver, it is important to understand your or your loved one's antibiotic treatment. It is especially important for caregivers to speak up when patients can't speak for themselves. Here are some important questions to ask your healthcare team.    What infection is this antibiotic treating and how do you know I have that infection?    What side effects might occur from this antibiotic?    How long will I need to take this antibiotic?    Is it safe to take this antibiotic with other medications or supplements (e.g., vitamins) that I am taking?     Are  there any special directions I need to know about taking this antibiotic? For example, should I take it with food?    How will I be monitored to know whether my infection is responding to the antibiotic?    What tests may help to make sure the right antibiotic is prescribed for me?      Information provided by:  www.cdc.gov/getsmart  U.S. Department of Health and Human Services  Centers for disease Control and Prevention  National Center for Emerging and Zoonotic Infectious Diseases  Division of Healthcare Quality Promotion             Medication List: This is a list of all your medications and when to take them. Check marks below indicate your daily home schedule. Keep this list as a reference.      Medications           Morning Afternoon Evening Bedtime As Needed    acetaminophen 500 MG tablet   Commonly known as:  TYLENOL   Take 1-2 tablets by mouth every 6 hours as needed.   Last time this was given:  975 mg on 3/18/2018 11:05 AM                                   albuterol 108 (90 BASE) MCG/ACT Inhaler   Commonly known as:  PROAIR HFA/PROVENTIL HFA/VENTOLIN HFA   Inhale 2 puffs into the lungs every 4 hours as needed                                   desonide 0.05 % lotion   Commonly known as:  DESOWEN   Apply topically as needed                                   furosemide 20 MG tablet   Commonly known as:  LASIX   Take 20 mg by mouth every morning                                   lactulose 10 GM/15ML solution   Commonly known as:  CHRONULAC   Take 6.7 g by mouth 2 times daily   Last time this was given:  10 mLs on 3/18/2018 10:47 AM                                      loratadine 10 MG tablet   Commonly known as:  CLARITIN   Take 1 tablet by mouth daily as needed for allergies.                                   Melatonin ER 5 MG Tbcr   Take 1 tablet by mouth At Bedtime                                   mometasone-formoterol 200-5 MCG/ACT oral inhaler   Commonly known as:  DULERA   Inhale 2 puffs into the lungs  2 times daily                                      PANTOPRAZOLE SODIUM PO   Take 40 mg by mouth every morning (before breakfast)                                   potassium chloride SA 10 MEQ CR tablet   Commonly known as:  K-DUR/KLOR-CON M   Take 1 tablet (10 mEq) by mouth daily                                propranolol 20 MG tablet   Commonly known as:  INDERAL   Take 1 tablet (20 mg) by mouth 2 times daily   Last time this was given:  20 mg on 3/18/2018 10:47 AM                                      SODIUM BICARBONATE PO   Take 650 mg by mouth daily as needed                                   sulfamethoxazole-trimethoprim 800-160 MG per tablet   Commonly known as:  BACTRIM DS/SEPTRA DS   Take 1 tablet by mouth 2 times daily                                      tiotropium 18 MCG capsule   Commonly known as:  SPIRIVA   Inhale 1 capsule into the lungs daily                                   TRAZODONE HCL PO   Take 50 mg by mouth At Bedtime   Last time this was given:  50 mg on 3/17/2018 10:01 PM

## 2018-03-16 NOTE — ED NOTES
Pt upset due to labs taking a long time.   Pt VSS.   Advised pt that we called regarding labs and will let pt know as soon as possible.   Pt gave verbal understanding.

## 2018-03-16 NOTE — IP AVS SNAPSHOT
[facility]    [address] [address]    Phone:  [phone]    Fax:  [phone]                                       After Visit Summary   3/16/2018    Abhijeet Mccauley    MRN: [id number]           After Visit Summary Signature Page     I have received my discharge instructions, and my questions have been answered. I have discussed any challenges I see with this plan with the nurse or doctor.    ..........................................................................................................................................  Patient/Patient Representative Signature      ..........................................................................................................................................  Patient Representative Print Name and Relationship to Patient    ..................................................               ................................................  Date                                            Time    ..........................................................................................................................................  Reviewed by Signature/Title    ...................................................              ..............................................  Date                                                            Time

## 2018-03-16 NOTE — CONSULTS
57-year-old male admitted from emergency room complaining by red blood per rectum for the past several days. Patient had a prior upper endoscopy by me about 6 weeks ago which showed esophageal candidiasis. He reports that this has improved but now reports this history of lower GI bleed. Patient has never had a colonoscopy. Patient is an avid drinker. He has known cirrhosis.    Patient Active Problem List   Diagnosis     Essential hypertension     Acute myeloid leukemia in remission (H)     Sensorineural hearing loss, asymmetrical     Alcoholic cirrhosis of liver (H)     Acute alcoholic hepatitis     Coagulation defect (H)     Osteoarthrosis     Hemorrhage of gastrointestinal tract     Thrombocytopenia (H)     Universal ulcerative (chronic) colitis(556.6) (H)     CKD (chronic kidney disease) stage 3, GFR 30-59 ml/min     Rosacea     Mild intermittent asthma without complication       Past Medical History:   Diagnosis Date     Hypertension        Past Surgical History:   Procedure Laterality Date     ESOPHAGOSCOPY, GASTROSCOPY, DUODENOSCOPY (EGD), COMBINED N/A 2/8/2018    Procedure: COMBINED ESOPHAGOSCOPY, GASTROSCOPY, DUODENOSCOPY (EGD), BIOPSY SINGLE OR MULTIPLE;  gastroscopy with biopsies;  Surgeon: Raz Cobb MD;  Location: [facility]       Family History   Problem Relation Age of Onset     Hypertension Mother      Coronary Artery Disease Father      MI       Social History   Substance Use Topics     Smoking status: Current Every Day Smoker     Packs/day: 0.50     Years: 30.00     Types: Cigarettes     Smokeless tobacco: Never Used      Comment: declined quit plan     Alcohol use Yes      Comment: 6-7 beers per day.  Sometimes a cocktail also.        History   Drug Use     Yes     Special: Marijuana       No current outpatient prescriptions on file.       Allergies   Allergen Reactions     Famotidine Unknown and Other (See Comments)     Severe abdominal cramps     Allergy      Ibuprofen     Cyclobenzaprine       "hives     Gabapentin      Made pt's \"face break out\"     Ibuprofen Dermatitis     Methocarbamol      Made pt's \"face break out\"     Motrin [Nsaids] Unknown and Hives     As reviewed in H/P of 3/2/12 Dr. Clint Plata Cramps     Severe abdominal cramps     Vancomycin      Other reaction(s): Other  hallucinations     Vfend      IV     Hydrocodone-Acetaminophen Rash     CBC  Recent Labs   Lab Test  03/16/18   1425   WBC  14.6*   RBC  2.84*   HGB  7.4*   HCT  24.1*   MCV  85   MCH  26.1*   MCHC  30.7*   RDW  19.1*   PLT  179       BMP  Recent Labs   Lab Test  03/16/18   1425   NA  139   POTASSIUM  4.0   BEE  8.2*   CHLORIDE  108   CO2  19*   BUN  18   CR  1.80*   GLC  81       LFTs  Recent Labs   Lab Test  03/16/18   1425   PROTTOTAL  7.3   ALBUMIN  3.0*   BILITOTAL  1.8*   ALKPHOS  247*   AST  35   ALT  23     ROS  Constitutional - Denies fevers, weight loss, malaise, lethargy  Neuro - Denies tremors or seizures  Pulmon - Denies SOB, dyspnea, hemoptysis, chronic cough or use of an inhaler  CV - Denies CP, SOB, lower extremity edema, difficulty w/ stairs, has never used NTG  GI - Denies hematemesis, BRBPR, melena, chronic diarrhea or epigastric pain   - Denies hematuria, difficulty voiding, h/o STDs  Hematology - Denies blood clotting disorders, chronic anemias  Dermatology - No melanomas or skin cancers  Rheumatology - No h/o RA  Pysch - Denies depression, bipolar d/o or schizophrenia    Exam:  Patient Vitals for the past 24 hrs:   BP Temp Temp src Pulse Heart Rate Resp SpO2   03/16/18 1729 - - - - 72 28 97 %   03/16/18 1715 - - - - 74 (!) 37 98 %   03/16/18 1700 143/78 - - - - - -   03/16/18 1640 133/70 - - - 78 29 98 %   03/16/18 1630 - - - - 76 24 100 %   03/16/18 1620 117/63 - - - 72 26 99 %   03/16/18 1608 130/62 - - - 75 (!) 33 100 %   03/16/18 1545 - - - - - - 99 %   03/16/18 1543 116/66 - - - - - 98 %   03/16/18 1540 109/59 - - - - - 99 %   03/16/18 1533 - - - - - - 99 %   03/16/18 1532 112/72 - - - - - " -   03/16/18 1306 119/69 99.4  F (37.4  C) Oral 84 - 18 99 %       General - Alert and Oriented X4, NAD, pale  HEENT - Normocephalic, atraumatic, PERRLA, Nose midline, Throat without lesions  Neck - supple, no LAD, Thyroid normal, Carotids without bruits  Lungs - Clear to auscultation bilaterally with good inspiratory effort, no tactile fremitus  CV - Heart RRR, no lift's, thrills, murmurs, rubs, or gallops. Carotid, radial, and femoral pulses 2+ bilaterally  Abdomen - Soft, non-tender, +BS, no hepatosplenomegaly, no palpable masses  Neuro - Full ROM, Strength 5/5 and major muscle groups, sensation intact  Extremities - No cyanosis, clubbing or edema    Assessment and plan: 57-year-old male with lower GI bleed. Patient will require an upper and lower endoscopy to evaluate source. We'll place patient on clear liquids tonight and begin bowel regimen tomorrow, plan on scope on Sunday.    Raz Cobb MD

## 2018-03-16 NOTE — TELEPHONE ENCOUNTER
"  Reason for Disposition    SEVERE dizziness (e.g., unable to stand, requires support to walk, feels like passing out now)    Additional Information    Negative: Shock suspected (e.g., cold/pale/clammy skin, too weak to stand, low BP, rapid pulse)    Negative: Difficult to awaken or acting confused  (e.g., disoriented, slurred speech)    Negative: Passed out (i.e., lost consciousness, collapsed and was not responding)    Negative: [1] Vomiting AND [2] contains red blood or black (\"coffee ground\") material  (Exception: few red streaks in vomit that only happened once)    Negative: Sounds like a life-threatening emergency to the triager    Negative: Diarrhea is main symptom    Negative: Stool color other than brown or tan is main concern  (no bleeding and no melena)    Negative: SEVERE rectal bleeding (large blood clots; on and off, or constant bleeding)    Answer Assessment - Initial Assessment Questions  1. APPEARANCE of BLOOD: \"What color is it?\" \"Is it passed separately, on the surface of the stool, or mixed in with the stool?\"       Separately and with stool  2. AMOUNT: \"How much blood was passed?\"       Lots of blood  3. FREQUENCY: \"How many times has blood been passed with the stools?\"       Yesterday started, this morning more  4. ONSET: \"When was the blood first seen in the stools?\" (Days or weeks)       Yesterday, has history of lower GI bleed, hospitalized at [facility]  5. DIARRHEA: \"Is there also some diarrhea?\" If so, ask: \"How many diarrhea stools were passed in past 24 hours?\"       no  6. CONSTIPATION: \"Do you have constipation?\" If so, \"How bad is it?\"      nol  7. RECURRENT SYMPTOMS: \"Have you had blood in your stools before?\" If so, ask: \"When was the last time?\" and \"What happened that time?\"       yes  8. BLOOD THINNERS: \"Do you take any blood thinners?\" (e.g., Coumadin/warfarin, Pradaxa/dabigatran, aspirin)      no  9. OTHER SYMPTOMS: \"Do you have any other symptoms?\"  (e.g., abdominal pain, " "vomiting, dizziness, fever)      Weakness, dizziness  10. PREGNANCY: \"Is there any chance you are pregnant?\" \"When was your last menstrual period?\"        no    Protocols used: RECTAL BLEEDING-ADULT-AH    "

## 2018-03-16 NOTE — ED NOTES
Seen [facility] one month ago for rectal bleeding.  Yesterday started to have rectal bleeding again

## 2018-03-16 NOTE — ED NOTES
2 episodes of bright red blood per rectum into the toilet. States he has to push when he has a bowel movement. Denies hemorrhoids or rectal pain. The patient was recently hospitalized at [facility] for the same thing and they found an ulcer.

## 2018-03-16 NOTE — ED PROVIDER NOTES
History     Chief Complaint   Patient presents with     Rectal Bleeding     HPI  Abhijeet Mccauley is a 57 year old male who has a history of hypertension, acute myeloid leukemia in remission, alcoholic cirrhosis of the liver, acute alcoholic hepatitis, osteoarthrosis, universal ulcerative colitis, hemorrhage of GI tract, thrombocytopenia, CKD stage III, and coagulation defect who presents to the ED for evaluation of rectal bleeding. Patient reports two days of rectal bleeding while having a bowel movement. He first noticed blood in the toilet yesterday morning at 07:00. Patient thought that this would go away, but then today he had another bout of bloody stool. He notes some abdominal pain in the lower abdomen bilaterally. Patient is not on any type of blood thinner. He smokes half a pack of cigarettes per day. He drinks 6 beers per day. He does not use illicit drugs. Patient had an upper endoscopy on 2/8/2018 done by Dr. Cobb for epigastric abdominal pain, impression below.  Reports taking Protonix for approximately 1 month, ran out of prescription and did not seek further refill.  No current PPI.    Previous Records Reviewed   Upper GI endoscopy 2/8/2018 1:09 PM   Indications: Epigastric abdominal pain   Providers: Raz Cobb MD, Malu Hackett RN    Impression:  - Normal nasopharynx and oropharynx.   - Moderately severe candidiasis esophagitis. Biopsied.   - Non-bleeding gastric ulcers with no stigmata of bleeding. Biopsied.   - Normal examined duodenum.     Problem List:    Patient Active Problem List    Diagnosis Date Noted     GI bleed 03/16/2018     Priority: Medium     Peptic ulcer disease 03/16/2018     Priority: Medium     Uncomplicated alcohol dependence (H) 03/16/2018     Priority: Medium     Tobacco dependence syndrome 03/16/2018     Priority: Medium     Mild intermittent asthma without complication 11/13/2017     Priority: Medium     CKD (chronic kidney disease) stage 3, GFR 30-59 ml/min  08/16/2017     Priority: Medium     Rosacea 08/16/2017     Priority: Medium     Sensorineural hearing loss, asymmetrical 03/28/2017     Priority: Medium     Thrombocytopenia (H) 11/11/2014     Priority: Medium     Universal ulcerative (chronic) colitis(556.6) (H) 11/11/2014     Priority: Medium     Alcoholic cirrhosis of liver (H) 11/04/2014     Priority: Medium     Coagulation defect (H) 11/04/2014     Priority: Medium     Acute alcoholic hepatitis 10/22/2014     Priority: Medium     Hemorrhage of gastrointestinal tract 10/22/2014     Priority: Medium     Osteoarthrosis 02/21/2014     Priority: Medium     Essential hypertension 03/28/2011     Priority: Medium     Problem list name updated by automated process. Provider to review       Acute myeloid leukemia in remission (H) 03/28/2011     Priority: Medium     S/p chemo, did not need bone marrow transplant          Past Medical History:    Past Medical History:   Diagnosis Date     Alcohol dependence (H)      Alcoholic cirrhosis of liver (H)      AML (acute myeloid leukemia) in remission (H)      COPD (chronic obstructive pulmonary disease) (H)      History of pulmonary embolus (PE)      Hypertension      PUD (peptic ulcer disease)      Tobacco dependence      Ulcerative colitis (H)        Past Surgical History:    Past Surgical History:   Procedure Laterality Date     AS TOTAL KNEE ARTHROPLASTY Left      ESOPHAGOSCOPY, GASTROSCOPY, DUODENOSCOPY (EGD), COMBINED N/A 2/8/2018    Procedure: COMBINED ESOPHAGOSCOPY, GASTROSCOPY, DUODENOSCOPY (EGD), BIOPSY SINGLE OR MULTIPLE;  gastroscopy with biopsies;  Surgeon: Raz Cobb MD;  Location: [facility]     LACERATION REPAIR Right     Right leg       Family History:    Family History   Problem Relation Age of Onset     Hypertension Mother      Coronary Artery Disease Father      MI       Social History:  Marital Status:  Single [1]  Social History   Substance Use Topics     Smoking status: Current Every Day Smoker      "Packs/day: 0.50     Years: 30.00     Types: Cigarettes     Smokeless tobacco: Never Used      Comment: declined quit plan     Alcohol use Yes      Comment: 6-7 beers per day.  Sometimes a cocktail also.        Medications:      No current outpatient prescriptions on file.      Review of Systems  All other systems are reviewed and are negative.    Physical Exam   BP: 119/69  Pulse: 84  Heart Rate: 75  Temp: 99.4  F (37.4  C)  Resp: 18  Height: 167.6 cm (5' 6\")  Weight: 83.9 kg (184 lb 15.5 oz)  SpO2: 99 %      Physical Exam  Nontoxic appearing no respiratory distress alert and oriented ×3  Head atraumatic normocephalic  Conjunctiva noninjected, oropharynx moist without lesions or erythema  No cervical adenopathy   Neck supple full active painless range of motion  Lungs clear to auscultation  Heart regular no murmur  Abdomen soft nontender bowel sounds positive no masses or HSM  Rectal exam shows normal tone, multiple condyloma acuminata are present perianally, no mass appreciated, scant stool and mucus, trace guaiac positive  Strength and sensation grossly intact throughout the extremities, gait and station normal  Speech is fluent, good eye contact, thought processes are rational  Lower extremities without swelling, redness or tenderness  Pedal pulses symmetrical and strong    ED Course     ED Course     Procedures               Critical Care time:  none     Results for orders placed or performed during the hospital encounter of 03/16/18   CBC with platelets differential   Result Value Ref Range    WBC 14.6 (H) 4.0 - 11.0 10e9/L    RBC Count 2.84 (L) 4.4 - 5.9 10e12/L    Hemoglobin 7.4 (L) 13.3 - 17.7 g/dL    Hematocrit 24.1 (L) 40.0 - 53.0 %    MCV 85 78 - 100 fl    MCH 26.1 (L) 26.5 - 33.0 pg    MCHC 30.7 (L) 31.5 - 36.5 g/dL    RDW 19.1 (H) 10.0 - 15.0 %    Platelet Count 179 150 - 450 10e9/L    Diff Method Automated Method     % Neutrophils 68.7 %    % Lymphocytes 10.5 %    % Monocytes 18.4 %    % Eosinophils " 1.8 %    % Basophils 0.3 %    % Immature Granulocytes 0.3 %    Absolute Neutrophil 10.0 (H) 1.6 - 8.3 10e9/L    Absolute Lymphocytes 1.5 0.8 - 5.3 10e9/L    Absolute Monocytes 2.7 (H) 0.0 - 1.3 10e9/L    Absolute Eosinophils 0.3 0.0 - 0.7 10e9/L    Absolute Basophils 0.0 0.0 - 0.2 10e9/L    Abs Immature Granulocytes 0.1 0 - 0.4 10e9/L   INR   Result Value Ref Range    INR 1.34 (H) 0.86 - 1.14   Comprehensive metabolic panel   Result Value Ref Range    Sodium 139 133 - 144 mmol/L    Potassium 4.0 3.4 - 5.3 mmol/L    Chloride 108 94 - 109 mmol/L    Carbon Dioxide 19 (L) 20 - 32 mmol/L    Anion Gap 12 3 - 14 mmol/L    Glucose 81 70 - 99 mg/dL    Urea Nitrogen 18 7 - 30 mg/dL    Creatinine 1.80 (H) 0.66 - 1.25 mg/dL    GFR Estimate 39 (L) >60 mL/min/1.7m2    GFR Estimate If Black 47 (L) >60 mL/min/1.7m2    Calcium 8.2 (L) 8.5 - 10.1 mg/dL    Bilirubin Total 1.8 (H) 0.2 - 1.3 mg/dL    Albumin 3.0 (L) 3.4 - 5.0 g/dL    Protein Total 7.3 6.8 - 8.8 g/dL    Alkaline Phosphatase 247 (H) 40 - 150 U/L    ALT 23 0 - 70 U/L    AST 35 0 - 45 U/L   Lipase   Result Value Ref Range    Lipase 177 73 - 393 U/L   Partial thromboplastin time   Result Value Ref Range    PTT 33 22 - 37 sec   Alcohol level blood   Result Value Ref Range    Ethanol g/dL 0.04 (H) <0.01 g/dL   Lactic acid level STAT   Result Value Ref Range    Lactic Acid 1.5 0.7 - 2.0 mmol/L   Hemoglobin   Result Value Ref Range    Hemoglobin 6.6 (LL) 13.3 - 17.7 g/dL   CBC with platelets differential   Result Value Ref Range    WBC 8.8 4.0 - 11.0 10e9/L    RBC Count 3.01 (L) 4.4 - 5.9 10e12/L    Hemoglobin 8.0 (L) 13.3 - 17.7 g/dL    Hematocrit 25.7 (L) 40.0 - 53.0 %    MCV 85 78 - 100 fl    MCH 26.6 26.5 - 33.0 pg    MCHC 31.1 (L) 31.5 - 36.5 g/dL    RDW 18.2 (H) 10.0 - 15.0 %    Platelet Count 157 150 - 450 10e9/L    Diff Method Automated Method     % Neutrophils 90.1 %    % Lymphocytes 4.0 %    % Monocytes 3.5 %    % Eosinophils 1.6 %    % Basophils 0.2 %    % Immature  Granulocytes 0.6 %    Absolute Neutrophil 8.0 1.6 - 8.3 10e9/L    Absolute Lymphocytes 0.4 (L) 0.8 - 5.3 10e9/L    Absolute Monocytes 0.3 0.0 - 1.3 10e9/L    Absolute Eosinophils 0.1 0.0 - 0.7 10e9/L    Absolute Basophils 0.0 0.0 - 0.2 10e9/L    Abs Immature Granulocytes 0.1 0 - 0.4 10e9/L   Basic metabolic panel   Result Value Ref Range    Sodium 140 133 - 144 mmol/L    Potassium 4.1 3.4 - 5.3 mmol/L    Chloride 109 94 - 109 mmol/L    Carbon Dioxide PENDING 20 - 32 mmol/L    Anion Gap PENDING 3 - 14 mmol/L    Glucose 88 70 - 99 mg/dL    Urea Nitrogen 18 7 - 30 mg/dL    Creatinine 1.77 (H) 0.66 - 1.25 mg/dL    GFR Estimate 40 (L) >60 mL/min/1.7m2    GFR Estimate If Black 48 (L) >60 mL/min/1.7m2    Calcium 7.9 (L) 8.5 - 10.1 mg/dL   Occult blood stool POCT   Result Value Ref Range    Occult Blood positive neg    Internal QC OK Yes     Test Card Lot Number [id number]    ABO/Rh type and screen   Result Value Ref Range    Units Ordered 2     ABO O     RH(D) Pos     Antibody Screen Pos (A)     Test Valid Only At [facility]        Specimen Expires 03/19/2018     Crossmatch Red Blood Cells     Blood Bank Comment       Delay in availability of Red Blood Cells called to  ESTUARDO MALONE SURG RN 03/16/2018 1530 YW.  Sent to Reference Lab     Blood component   Result Value Ref Range    Unit Number [id number]     Blood Component Type Red Blood Cells Leukocyte Reduced     Division Number 00     Status of Unit Released to care unit 03/17/2018 0013     Blood Product Code P4410A00     Unit Status ISS    Blood component   Result Value Ref Range    Unit Number [id number]     Blood Component Type Red Blood Cells Leukocyte Reduced     Division Number 00     Status of Unit Ready for patient 03/17/2018 0121     Blood Product Code G6583N44     Unit Status JUNO    Transfusion Rxn Blood Bank Notification   Result Value Ref Range    Transfusion Rxn Blood Bank Notification Order received                  Results for  orders placed or performed during the hospital encounter of 03/16/18 (from the past 24 hour(s))   CBC with platelets differential   Result Value Ref Range    WBC 14.6 (H) 4.0 - 11.0 10e9/L    RBC Count 2.84 (L) 4.4 - 5.9 10e12/L    Hemoglobin 7.4 (L) 13.3 - 17.7 g/dL    Hematocrit 24.1 (L) 40.0 - 53.0 %    MCV 85 78 - 100 fl    MCH 26.1 (L) 26.5 - 33.0 pg    MCHC 30.7 (L) 31.5 - 36.5 g/dL    RDW 19.1 (H) 10.0 - 15.0 %    Platelet Count 179 150 - 450 10e9/L    Diff Method Automated Method     % Neutrophils 68.7 %    % Lymphocytes 10.5 %    % Monocytes 18.4 %    % Eosinophils 1.8 %    % Basophils 0.3 %    % Immature Granulocytes 0.3 %    Absolute Neutrophil 10.0 (H) 1.6 - 8.3 10e9/L    Absolute Lymphocytes 1.5 0.8 - 5.3 10e9/L    Absolute Monocytes 2.7 (H) 0.0 - 1.3 10e9/L    Absolute Eosinophils 0.3 0.0 - 0.7 10e9/L    Absolute Basophils 0.0 0.0 - 0.2 10e9/L    Abs Immature Granulocytes 0.1 0 - 0.4 10e9/L   INR   Result Value Ref Range    INR 1.34 (H) 0.86 - 1.14   Comprehensive metabolic panel   Result Value Ref Range    Sodium 139 133 - 144 mmol/L    Potassium 4.0 3.4 - 5.3 mmol/L    Chloride 108 94 - 109 mmol/L    Carbon Dioxide 19 (L) 20 - 32 mmol/L    Anion Gap 12 3 - 14 mmol/L    Glucose 81 70 - 99 mg/dL    Urea Nitrogen 18 7 - 30 mg/dL    Creatinine 1.80 (H) 0.66 - 1.25 mg/dL    GFR Estimate 39 (L) >60 mL/min/1.7m2    GFR Estimate If Black 47 (L) >60 mL/min/1.7m2    Calcium 8.2 (L) 8.5 - 10.1 mg/dL    Bilirubin Total 1.8 (H) 0.2 - 1.3 mg/dL    Albumin 3.0 (L) 3.4 - 5.0 g/dL    Protein Total 7.3 6.8 - 8.8 g/dL    Alkaline Phosphatase 247 (H) 40 - 150 U/L    ALT 23 0 - 70 U/L    AST 35 0 - 45 U/L   Lipase   Result Value Ref Range    Lipase 177 73 - 393 U/L   Partial thromboplastin time   Result Value Ref Range    PTT 33 22 - 37 sec   Alcohol level blood   Result Value Ref Range    Ethanol g/dL 0.04 (H) <0.01 g/dL   ABO/Rh type and screen   Result Value Ref Range    Units Ordered 2     ABO O     RH(D) Pos      Antibody Screen Pos (A)     Test Valid Only At [facility]        Specimen Expires 03/19/2018     Crossmatch Red Blood Cells     Blood Bank Comment       Delay in availability of Red Blood Cells called to  ESTUARDO CAMPOVERDE RN 03/16/2018 1530 YW.  Sent to Reference Lab     Blood component   Result Value Ref Range    Unit Number [id number]     Blood Component Type Red Blood Cells Leukocyte Reduced     Division Number 00     Status of Unit Released to care unit 03/17/2018 0013     Blood Product Code C6277B21     Unit Status ISS    Blood component   Result Value Ref Range    Unit Number [id number]     Blood Component Type Red Blood Cells Leukocyte Reduced     Division Number 00     Status of Unit Ready for patient 03/17/2018 0121     Blood Product Code I8821H32     Unit Status JUNO    Lactic acid level STAT   Result Value Ref Range    Lactic Acid 1.5 0.7 - 2.0 mmol/L   Hemoglobin   Result Value Ref Range    Hemoglobin 6.6 (LL) 13.3 - 17.7 g/dL   CBC with platelets differential   Result Value Ref Range    WBC 8.8 4.0 - 11.0 10e9/L    RBC Count 3.01 (L) 4.4 - 5.9 10e12/L    Hemoglobin 8.0 (L) 13.3 - 17.7 g/dL    Hematocrit 25.7 (L) 40.0 - 53.0 %    MCV 85 78 - 100 fl    MCH 26.6 26.5 - 33.0 pg    MCHC 31.1 (L) 31.5 - 36.5 g/dL    RDW 18.2 (H) 10.0 - 15.0 %    Platelet Count 157 150 - 450 10e9/L    Diff Method Automated Method     % Neutrophils 90.1 %    % Lymphocytes 4.0 %    % Monocytes 3.5 %    % Eosinophils 1.6 %    % Basophils 0.2 %    % Immature Granulocytes 0.6 %    Absolute Neutrophil 8.0 1.6 - 8.3 10e9/L    Absolute Lymphocytes 0.4 (L) 0.8 - 5.3 10e9/L    Absolute Monocytes 0.3 0.0 - 1.3 10e9/L    Absolute Eosinophils 0.1 0.0 - 0.7 10e9/L    Absolute Basophils 0.0 0.0 - 0.2 10e9/L    Abs Immature Granulocytes 0.1 0 - 0.4 10e9/L   Basic metabolic panel   Result Value Ref Range    Sodium 140 133 - 144 mmol/L    Potassium 4.1 3.4 - 5.3 mmol/L    Chloride 109 94 - 109 mmol/L    Carbon Dioxide  PENDING 20 - 32 mmol/L    Anion Gap PENDING 3 - 14 mmol/L    Glucose 88 70 - 99 mg/dL    Urea Nitrogen 18 7 - 30 mg/dL    Creatinine 1.77 (H) 0.66 - 1.25 mg/dL    GFR Estimate 40 (L) >60 mL/min/1.7m2    GFR Estimate If Black 48 (L) >60 mL/min/1.7m2    Calcium 7.9 (L) 8.5 - 10.1 mg/dL   Transfusion Rxn Blood Bank Notification   Result Value Ref Range    Transfusion Rxn Blood Bank Notification Order received        Medications   naloxone (NARCAN) injection 0.1-0.4 mg (not administered)   polyethylene glycol (MIRALAX/GLYCOLAX) powder 238 g (not administered)   docusate sodium (COLACE) capsule 200 mg (not administered)   magnesium citrate solution 296 mL (not administered)   pantoprazole (PROTONIX) 40 mg IV push injection (40 mg Intravenous Given 3/17/18 0707)   melatonin tablet 5 mg (5 mg Oral Given 3/16/18 2239)   traZODone (DESYREL) tablet 50 mg (50 mg Oral Given 3/16/18 2239)   fluticasone-vilanterol (BREO ELLIPTA) 200-25 MCG/INH oral inhaler 1 puff (not administered)   propranolol (INDERAL) tablet 20 mg (20 mg Oral Not Given 3/17/18 0015)   umeclidinium (INCRUSE ELLIPTA) 62.5 MCG/INH oral inhaler 62.5 mcg (not administered)   LORazepam (ATIVAN) tablet 1-2 mg (not administered)     Or   LORazepam (ATIVAN) injection 1-2 mg (not administered)   sodium chloride 0.9 % 1,000 mL with INFUVITE 10 mL, thiamine 100 mg, folic acid 1 mg infusion ( Intravenous Stopped 3/17/18 0028)   sodium chloride 0.9% infusion ( Intravenous New Bag 3/17/18 0307)   albuterol neb solution 2.5 mg (not administered)   nicotine Patch in Place ( Transdermal Not Given 3/17/18 0258)   nicotine patch REMOVAL (not administered)   nicotine (NICODERM CQ) 14 MG/24HR 24 hr patch 1 patch (1 patch Transdermal Not Given 3/17/18 0258)   ondansetron (ZOFRAN-ODT) ODT tab 4 mg ( Oral See Alternative 3/17/18 0512)     Or   ondansetron (ZOFRAN) injection 4 mg (4 mg Intravenous Given 3/17/18 0512)   prochlorperazine (COMPAZINE) injection 10 mg (not administered)      Or   prochlorperazine (COMPAZINE) tablet 10 mg (not administered)     Or   prochlorperazine (COMPAZINE) Suppository 25 mg (not administered)   metoclopramide (REGLAN) tablet 10 mg (not administered)     Or   metoclopramide (REGLAN) injection 10 mg (not administered)   acetaminophen (TYLENOL) tablet 975 mg (975 mg Oral Given 3/17/18 0514)     Or   acetaminophen (TYLENOL) Suppository 650 mg ( Rectal See Alternative 3/17/18 0514)   0.9% sodium chloride BOLUS (0 mLs Intravenous Stopped 3/16/18 1727)   pantoprazole (PROTONIX) 40 mg IV push injection (40 mg Intravenous Given 3/16/18 1608)        1:24 PM Patient assessed.    Assessments & Plan (with Medical Decision Making)  57-year-old male alcohol dependence history of alcohol liver disease presents with bright red blood per rectum.  Guaiac trace positive, hemoglobin 7.4, one episode of bleeding in the ED.  Vitals remain within normal limits.  Protonix IV, type and cross.  Reviewed with Emma PAGE who accepts patient for admission to hospitalist service.  Reviewed with Dr. general surgeryCain regarding GI bleed.  Patient will be admitted to hospital for ongoing monitoring and serial hemoglobin and colonoscopy.     I have reviewed the nursing notes.    I have reviewed the findings, diagnosis, plan and need for follow up with the patient.       Current Discharge Medication List          Final diagnoses:   Gastrointestinal hemorrhage, unspecified gastrointestinal hemorrhage type     This document serves as a record of the services and decisions personally performed and made by No att. providers found. It was created on HIS/HER behalf by   Chantale Valenzuela, a trained medical scribe. The creation of this document is based the provider's statements to the medical scribe.  Chantale Valenzuela 1:24 PM 3/16/2018    Provider:   The information in this document, created by the medical scribe for me, accurately reflects the services I personally performed and the  decisions made by me. I have reviewed and approved this document for accuracy prior to leaving the patient care area.  No att. providers found 1:24 PM 3/16/2018    3/16/2018   [facility] EMERGENCY DEPARTMENT     Dillon Sparks MD  03/17/18 0713

## 2018-03-16 NOTE — PROGRESS NOTES
"[facility] [place], [place] ADMISSION NOTE    Patient admitted to room 2302 at approximately 5:50 PM via cart from emergency room. Patient was accompanied by transport tech.     Verbal SBAR report received from Tosin DIA prior to patient arrival.     Patient ambulated to bed independently. Patient alert and oriented X 3. Pain is controlled without any medications.  . Admission vital signs: Blood pressure 148/68, pulse 79, temperature 98  F (36.7  C), temperature source Oral, resp. rate 20, height 1.676 m (5' 6\"), weight 83.9 kg (184 lb 15.5 oz), SpO2 98 %. Patient was oriented to plan of care, call light, bed controls, tv, telephone, bathroom and visiting hours.     The following safety risks were identified during admission: none. Yellow risk band applied: DIVINE Feng    "

## 2018-03-17 ENCOUNTER — ANESTHESIA EVENT (OUTPATIENT)
Dept: GASTROENTEROLOGY | Facility: CLINIC | Age: 58
DRG: 378 | End: 2018-03-17
Payer: COMMERCIAL

## 2018-03-17 LAB
ABO + RH BLD: ABNORMAL
ABO + RH BLD: ABNORMAL
ALBUMIN UR-MCNC: NEGATIVE MG/DL
ANION GAP SERPL CALCULATED.3IONS-SCNC: 12 MMOL/L (ref 3–14)
APPEARANCE UR: ABNORMAL
BACTERIA #/AREA URNS HPF: ABNORMAL /HPF
BASOPHILS # BLD AUTO: 0 10E9/L (ref 0–0.2)
BASOPHILS NFR BLD AUTO: 0.2 %
BILIRUB DIRECT SERPL-MCNC: 1.9 MG/DL (ref 0–0.2)
BILIRUB SERPL-MCNC: 3.9 MG/DL (ref 0.2–1.3)
BILIRUB UR QL STRIP: NEGATIVE
BLD GP AB INVEST PLASRBC-IMP: ABNORMAL
BLD GP AB SCN SERPL QL: ABNORMAL
BLD PROD TYP BPU: ABNORMAL
BLD PROD TYP BPU: NORMAL
BLD PROD TYP BPU: NORMAL
BLD UNIT ID BPU: 0
BLD UNIT ID BPU: 0
BLOOD BANK CMNT PATIENT-IMP: ABNORMAL
BLOOD BANK CMNT PATIENT-IMP: ABNORMAL
BLOOD PRODUCT CODE: NORMAL
BLOOD PRODUCT CODE: NORMAL
BPU ID: NORMAL
BPU ID: NORMAL
BUN SERPL-MCNC: 18 MG/DL (ref 7–30)
CALCIUM SERPL-MCNC: 7.9 MG/DL (ref 8.5–10.1)
CHLORIDE SERPL-SCNC: 109 MMOL/L (ref 94–109)
CO2 SERPL-SCNC: 19 MMOL/L (ref 20–32)
COLOR UR AUTO: YELLOW
CREAT SERPL-MCNC: 1.77 MG/DL (ref 0.66–1.25)
DIFFERENTIAL METHOD BLD: ABNORMAL
EOSINOPHIL # BLD AUTO: 0.1 10E9/L (ref 0–0.7)
EOSINOPHIL NFR BLD AUTO: 1.6 %
ERYTHROCYTE [DISTWIDTH] IN BLOOD BY AUTOMATED COUNT: 18.2 % (ref 10–15)
GFR SERPL CREATININE-BSD FRML MDRD: 40 ML/MIN/1.7M2
GLUCOSE SERPL-MCNC: 88 MG/DL (ref 70–99)
GLUCOSE UR STRIP-MCNC: NEGATIVE MG/DL
HCT VFR BLD AUTO: 25.7 % (ref 40–53)
HGB BLD-MCNC: 7.5 G/DL (ref 13.3–17.7)
HGB BLD-MCNC: 8 G/DL (ref 13.3–17.7)
HGB UR QL STRIP: ABNORMAL
IMM GRANULOCYTES # BLD: 0.1 10E9/L (ref 0–0.4)
IMM GRANULOCYTES NFR BLD: 0.6 %
KETONES UR STRIP-MCNC: NEGATIVE MG/DL
LDH SERPL L TO P-CCNC: 156 U/L (ref 85–227)
LEUKOCYTE ESTERASE UR QL STRIP: ABNORMAL
LYMPHOCYTES # BLD AUTO: 0.4 10E9/L (ref 0.8–5.3)
LYMPHOCYTES NFR BLD AUTO: 4 %
MCH RBC QN AUTO: 26.6 PG (ref 26.5–33)
MCHC RBC AUTO-ENTMCNC: 31.1 G/DL (ref 31.5–36.5)
MCV RBC AUTO: 85 FL (ref 78–100)
MONOCYTES # BLD AUTO: 0.3 10E9/L (ref 0–1.3)
MONOCYTES NFR BLD AUTO: 3.5 %
MUCOUS THREADS #/AREA URNS LPF: PRESENT /LPF
NEUTROPHILS # BLD AUTO: 8 10E9/L (ref 1.6–8.3)
NEUTROPHILS NFR BLD AUTO: 90.1 %
NITRATE UR QL: NEGATIVE
NUM BPU REQUESTED: 2
PH UR STRIP: 6 PH (ref 5–7)
PLATELET # BLD AUTO: 157 10E9/L (ref 150–450)
POTASSIUM SERPL-SCNC: 4.1 MMOL/L (ref 3.4–5.3)
RBC # BLD AUTO: 3.01 10E12/L (ref 4.4–5.9)
RBC #/AREA URNS AUTO: 43 /HPF (ref 0–2)
SODIUM SERPL-SCNC: 140 MMOL/L (ref 133–144)
SOURCE: ABNORMAL
SP GR UR STRIP: 1 (ref 1–1.03)
SPECIMEN EXP DATE BLD: ABNORMAL
TRANSFUSION RXN BLOOD BANK NOTIFICATION: NORMAL
TRANSFUSION STATUS PATIENT QL: NORMAL
UROBILINOGEN UR STRIP-MCNC: 4 MG/DL (ref 0–2)
WBC # BLD AUTO: 8.8 10E9/L (ref 4–11)
WBC #/AREA URNS AUTO: >182 /HPF (ref 0–5)
WBC CLUMPS #/AREA URNS HPF: PRESENT /HPF

## 2018-03-17 PROCEDURE — P9016 RBC LEUKOCYTES REDUCED: HCPCS | Performed by: EMERGENCY MEDICINE

## 2018-03-17 PROCEDURE — 25000125 ZZHC RX 250: Performed by: PHYSICIAN ASSISTANT

## 2018-03-17 PROCEDURE — 87040 BLOOD CULTURE FOR BACTERIA: CPT | Performed by: FAMILY MEDICINE

## 2018-03-17 PROCEDURE — 40000341 ZZHCL STATISTIC BB TRANSF RXN INVEST: Performed by: FAMILY MEDICINE

## 2018-03-17 PROCEDURE — 85025 COMPLETE CBC W/AUTO DIFF WBC: CPT | Performed by: PHYSICIAN ASSISTANT

## 2018-03-17 PROCEDURE — 25000132 ZZH RX MED GY IP 250 OP 250 PS 637: Performed by: PHYSICIAN ASSISTANT

## 2018-03-17 PROCEDURE — 99233 SBSQ HOSP IP/OBS HIGH 50: CPT | Performed by: FAMILY MEDICINE

## 2018-03-17 PROCEDURE — 40000275 ZZH STATISTIC RCP TIME EA 10 MIN

## 2018-03-17 PROCEDURE — 36415 COLL VENOUS BLD VENIPUNCTURE: CPT | Performed by: FAMILY MEDICINE

## 2018-03-17 PROCEDURE — 82247 BILIRUBIN TOTAL: CPT | Performed by: PATHOLOGY

## 2018-03-17 PROCEDURE — 25000128 H RX IP 250 OP 636: Performed by: FAMILY MEDICINE

## 2018-03-17 PROCEDURE — 87186 SC STD MICRODIL/AGAR DIL: CPT | Performed by: FAMILY MEDICINE

## 2018-03-17 PROCEDURE — 80048 BASIC METABOLIC PNL TOTAL CA: CPT | Performed by: PHYSICIAN ASSISTANT

## 2018-03-17 PROCEDURE — 25000132 ZZH RX MED GY IP 250 OP 250 PS 637: Performed by: SURGERY

## 2018-03-17 PROCEDURE — 81001 URINALYSIS AUTO W/SCOPE: CPT | Performed by: FAMILY MEDICINE

## 2018-03-17 PROCEDURE — 36415 COLL VENOUS BLD VENIPUNCTURE: CPT | Performed by: PHYSICIAN ASSISTANT

## 2018-03-17 PROCEDURE — 83615 LACTATE (LD) (LDH) ENZYME: CPT | Performed by: PATHOLOGY

## 2018-03-17 PROCEDURE — 87088 URINE BACTERIA CULTURE: CPT | Performed by: FAMILY MEDICINE

## 2018-03-17 PROCEDURE — 87086 URINE CULTURE/COLONY COUNT: CPT | Performed by: FAMILY MEDICINE

## 2018-03-17 PROCEDURE — 82248 BILIRUBIN DIRECT: CPT | Performed by: PATHOLOGY

## 2018-03-17 PROCEDURE — 25000132 ZZH RX MED GY IP 250 OP 250 PS 637: Performed by: FAMILY MEDICINE

## 2018-03-17 PROCEDURE — 85018 HEMOGLOBIN: CPT | Performed by: FAMILY MEDICINE

## 2018-03-17 PROCEDURE — 86922 COMPATIBILITY TEST ANTIGLOB: CPT | Performed by: FAMILY MEDICINE

## 2018-03-17 PROCEDURE — 12000007 ZZH R&B INTERMEDIATE

## 2018-03-17 PROCEDURE — 25000128 H RX IP 250 OP 636: Performed by: PHYSICIAN ASSISTANT

## 2018-03-17 PROCEDURE — 87506 IADNA-DNA/RNA PROBE TQ 6-11: CPT | Performed by: FAMILY MEDICINE

## 2018-03-17 RX ORDER — METOCLOPRAMIDE 10 MG/1
10 TABLET ORAL EVERY 6 HOURS PRN
Status: DISCONTINUED | OUTPATIENT
Start: 2018-03-17 | End: 2018-03-18 | Stop reason: HOSPADM

## 2018-03-17 RX ORDER — ALBUTEROL SULFATE 90 UG/1
2 AEROSOL, METERED RESPIRATORY (INHALATION) EVERY 4 HOURS PRN
Status: DISCONTINUED | OUTPATIENT
Start: 2018-03-17 | End: 2018-03-18 | Stop reason: HOSPADM

## 2018-03-17 RX ORDER — ACETAMINOPHEN 500 MG
500-1000 TABLET ORAL EVERY 6 HOURS PRN
Status: DISCONTINUED | OUTPATIENT
Start: 2018-03-17 | End: 2018-03-18 | Stop reason: HOSPADM

## 2018-03-17 RX ORDER — PROCHLORPERAZINE MALEATE 10 MG
10 TABLET ORAL EVERY 6 HOURS PRN
Status: DISCONTINUED | OUTPATIENT
Start: 2018-03-17 | End: 2018-03-18 | Stop reason: HOSPADM

## 2018-03-17 RX ORDER — LANOLIN ALCOHOL/MO/W.PET/CERES
100 CREAM (GRAM) TOPICAL DAILY
Status: DISCONTINUED | OUTPATIENT
Start: 2018-03-17 | End: 2018-03-18 | Stop reason: HOSPADM

## 2018-03-17 RX ORDER — NICOTINE 21 MG/24HR
1 PATCH, TRANSDERMAL 24 HOURS TRANSDERMAL DAILY
Status: DISCONTINUED | OUTPATIENT
Start: 2018-03-17 | End: 2018-03-18 | Stop reason: HOSPADM

## 2018-03-17 RX ORDER — MULTIPLE VITAMINS W/ MINERALS TAB 9MG-400MCG
1 TAB ORAL DAILY
Status: DISCONTINUED | OUTPATIENT
Start: 2018-03-17 | End: 2018-03-18 | Stop reason: HOSPADM

## 2018-03-17 RX ORDER — LACTULOSE 10 G/15ML
10 SOLUTION ORAL 2 TIMES DAILY
Status: DISCONTINUED | OUTPATIENT
Start: 2018-03-17 | End: 2018-03-18 | Stop reason: HOSPADM

## 2018-03-17 RX ORDER — METOCLOPRAMIDE HYDROCHLORIDE 5 MG/ML
10 INJECTION INTRAMUSCULAR; INTRAVENOUS EVERY 6 HOURS PRN
Status: DISCONTINUED | OUTPATIENT
Start: 2018-03-17 | End: 2018-03-18 | Stop reason: HOSPADM

## 2018-03-17 RX ORDER — FOLIC ACID 1 MG/1
1 TABLET ORAL DAILY
Status: DISCONTINUED | OUTPATIENT
Start: 2018-03-17 | End: 2018-03-18 | Stop reason: HOSPADM

## 2018-03-17 RX ORDER — ONDANSETRON 4 MG/1
4 TABLET, ORALLY DISINTEGRATING ORAL EVERY 6 HOURS PRN
Status: DISCONTINUED | OUTPATIENT
Start: 2018-03-17 | End: 2018-03-18 | Stop reason: HOSPADM

## 2018-03-17 RX ORDER — ACETAMINOPHEN 325 MG/1
975 TABLET ORAL EVERY 4 HOURS PRN
Status: DISCONTINUED | OUTPATIENT
Start: 2018-03-17 | End: 2018-03-18 | Stop reason: HOSPADM

## 2018-03-17 RX ORDER — LIDOCAINE 40 MG/G
CREAM TOPICAL
Status: DISCONTINUED | OUTPATIENT
Start: 2018-03-17 | End: 2018-03-18 | Stop reason: HOSPADM

## 2018-03-17 RX ORDER — CEFTRIAXONE SODIUM 1 G/50ML
1 INJECTION, SOLUTION INTRAVENOUS EVERY 24 HOURS
Status: DISCONTINUED | OUTPATIENT
Start: 2018-03-17 | End: 2018-03-18 | Stop reason: HOSPADM

## 2018-03-17 RX ORDER — ACETAMINOPHEN 650 MG/1
650 SUPPOSITORY RECTAL EVERY 4 HOURS PRN
Status: DISCONTINUED | OUTPATIENT
Start: 2018-03-17 | End: 2018-03-18 | Stop reason: HOSPADM

## 2018-03-17 RX ORDER — ONDANSETRON 2 MG/ML
4 INJECTION INTRAMUSCULAR; INTRAVENOUS EVERY 6 HOURS PRN
Status: DISCONTINUED | OUTPATIENT
Start: 2018-03-17 | End: 2018-03-18 | Stop reason: HOSPADM

## 2018-03-17 RX ORDER — PROCHLORPERAZINE 25 MG
25 SUPPOSITORY, RECTAL RECTAL EVERY 12 HOURS PRN
Status: DISCONTINUED | OUTPATIENT
Start: 2018-03-17 | End: 2018-03-18 | Stop reason: HOSPADM

## 2018-03-17 RX ADMIN — TRAZODONE HYDROCHLORIDE 50 MG: 50 TABLET ORAL at 22:01

## 2018-03-17 RX ADMIN — DOCUSATE SODIUM 200 MG: 100 CAPSULE, LIQUID FILLED ORAL at 11:09

## 2018-03-17 RX ADMIN — PANTOPRAZOLE SODIUM 40 MG: 40 INJECTION, POWDER, FOR SOLUTION INTRAVENOUS at 07:07

## 2018-03-17 RX ADMIN — ACETAMINOPHEN 975 MG: 325 TABLET, FILM COATED ORAL at 14:38

## 2018-03-17 RX ADMIN — ONDANSETRON 4 MG: 2 INJECTION INTRAMUSCULAR; INTRAVENOUS at 05:12

## 2018-03-17 RX ADMIN — LACTULOSE 10 ML: 20 SOLUTION ORAL at 20:14

## 2018-03-17 RX ADMIN — UMECLIDINIUM 62.5 MCG: 62.5 AEROSOL, POWDER ORAL at 08:47

## 2018-03-17 RX ADMIN — SODIUM CHLORIDE: 9 INJECTION, SOLUTION INTRAVENOUS at 00:10

## 2018-03-17 RX ADMIN — FLUTICASONE FUROATE AND VILANTEROL TRIFENATATE 1 PUFF: 200; 25 POWDER RESPIRATORY (INHALATION) at 08:47

## 2018-03-17 RX ADMIN — PROPRANOLOL HYDROCHLORIDE 20 MG: 20 TABLET ORAL at 08:46

## 2018-03-17 RX ADMIN — Medication 100 MG: at 11:09

## 2018-03-17 RX ADMIN — FOLIC ACID 1 MG: 1 TABLET ORAL at 11:09

## 2018-03-17 RX ADMIN — CEFTRIAXONE SODIUM 1 G: 1 INJECTION, SOLUTION INTRAVENOUS at 10:12

## 2018-03-17 RX ADMIN — Medication 5 MG: at 22:00

## 2018-03-17 RX ADMIN — LACTULOSE 10 ML: 20 SOLUTION ORAL at 10:14

## 2018-03-17 RX ADMIN — MULTIPLE VITAMINS W/ MINERALS TAB 1 TABLET: TAB at 11:09

## 2018-03-17 RX ADMIN — POLYETHYLENE GLYCOL 3350 238 G: 17 POWDER, FOR SOLUTION ORAL at 15:08

## 2018-03-17 RX ADMIN — SODIUM CHLORIDE: 9 INJECTION, SOLUTION INTRAVENOUS at 03:07

## 2018-03-17 RX ADMIN — ACETAMINOPHEN 975 MG: 325 TABLET, FILM COATED ORAL at 05:14

## 2018-03-17 RX ADMIN — PANTOPRAZOLE SODIUM 40 MG: 40 INJECTION, POWDER, FOR SOLUTION INTRAVENOUS at 20:14

## 2018-03-17 ASSESSMENT — COPD QUESTIONNAIRES
CAT_SEVERITY: MODERATE
COPD: 1

## 2018-03-17 ASSESSMENT — LIFESTYLE VARIABLES: TOBACCO_USE: 1

## 2018-03-17 NOTE — PROGRESS NOTES
Skin affirmation note    Admitting nurse completed full skin assessment, Kartik score and Kartik interventions. This writer agrees with the initial skin assessment findings.

## 2018-03-17 NOTE — PROGRESS NOTES
UA collected and sent Clear susan. Pt. Had a small amount of rectal bleeding into toilet. A few dime sized clots.

## 2018-03-17 NOTE — H&P
[facility]    History and Physical  Hospital Medicine       Date of Admission:  3/16/2018  Date of Service: 3/16/2018     CC: bright red blood per rectum    Assessment & Plan   Abhijeet Mccauley is a 57 year old male with a past medical history significant for PUD and GI bleeding, chronic kidney disease, AML s/p chemo now in remission, alcohol dependence, alcoholic liver cirrhosis with coagulation defect, ulcerative colitis, copd, tobacco dependence who presents on 3/16/2018 with bright red blood per rectum likely due to a lower GI bleed.      GI bleed, likely lower source / bright red blood per rectum  Occurs in setting of known PUD, history of previous GI bleed, alcohol dependence, and history of varices s/p banding. Was recently hospitalized at [facility] in Jan 2018 for melena, required 3U PRBC, underwent EGD showing grade 2 varices which were banded. Presented on 3/16/18 with hemoglobin 7.4 after 2 episodes of bright red blood per rectum. Hemodynamically stable upon admission.  - Monitor hemoglobin q 6 hours (patient initially refusing blood draws, but now compliant)  - Transfuse if hemoglobin <7.0 - patient has been consented, consent present in paper chart. Patient OK to receive non-irradiated blood (history of AML but no bone marrow transplant, see below - discussed with Lab who discussed with pathology, OK for non-irradiated blood)  - Protonix bid  - Telemetry  - IV NS @ 100  - Clear liquid diet  - Plans for colonoscopy and EGD by general surgery on Sunday after prep      Uncomplicated alcohol dependence (H)  History of withdrawal and seizures in the past. Presented with alcohol level 0.04. No evidence of withdrawal on exam, although potentially at risk for withdrawing.  - CIWA protocol  - Prn Ativan    Alcoholic cirrhosis of liver (H) / Coagulation defect (H)  Previously diagnosed. LFTs normal. INR 1.34, chronically elevated (not on pharmacologic anticoagulation). Is  prescribed lactulose but has not been taking - defer restart to day team.    Leukocytosis  Presented with WBC 14.6, no other evidence of infection. Recheck in am.    Essential hypertension  Previously diagnosed, pressures stable during emergency department course. Taking propranolol and lasix prior to admission - hold lasix, continue propranolol with holding parameters.      Acute myeloid leukemia in remission (H)  Diagnosed in 2008, s/p chemo, did not require bone marrow transplant. Officially in remission since 2012. Treated at [facility]. As above, OK to received irradiated blood products.    Chronic kidney disease  Previously diagnosed. Presented with creatinine 1.80, baseline between 1.9-2.1. Monitior.      Universal ulcerative (chronic) colitis(556.6) (H)  Per chart review. Patient not taking any active medications for this currently.      Mild intermittent asthma without complication  Stable respiratory status. Taking Dulera, Spiriva, and albuterol prior to admission - med rec pending, Dulera & Spiriva ordered. Albuterol nebs ordered.    Tobacco dependence syndrome  Encourage cessation. Nicotine replacement available.        Fluids: IV NS @ 100  Electrolytes: Monitor  Nutrition: Clear liquid    DVT Prophylaxis: Pneumatic Compression Devices, no pharmacologic anticoagulation due to GI bleed  Code Status: Full Code - discussed directly with patient    Lines: Peripheral  Chong catheter: Not indicated    Disposition: Anticipate discharge in 2+ day(s). Appropriate for inpatient care.    Jessy Bishop PA-C  [facility] Hospitalist Service  Pager: [phone]          Primary Care Physician   Parrish Funk [phone]    History is obtained from the patient and review of old records via the EMR.    Past Medical History      Past Medical History:   Diagnosis Date     Alcohol dependence (H)     History of withdrawal and seizures     Alcoholic cirrhosis of liver (H)      AML (acute myeloid leukemia) in  "remission (H)     s/p chemo, no bone marrow transplant     COPD (chronic obstructive pulmonary disease) (H)      History of pulmonary embolus (PE)      Hypertension      PUD (peptic ulcer disease)      Tobacco dependence      Ulcerative colitis (H)        Past Surgical History     Past Surgical History:   Procedure Laterality Date     AS TOTAL KNEE ARTHROPLASTY Left      ESOPHAGOSCOPY, GASTROSCOPY, DUODENOSCOPY (EGD), COMBINED N/A 2/8/2018    Procedure: COMBINED ESOPHAGOSCOPY, GASTROSCOPY, DUODENOSCOPY (EGD), BIOPSY SINGLE OR MULTIPLE;  gastroscopy with biopsies;  Surgeon: Raz Cobb MD;  Location: [facility]     LACERATION REPAIR Right     Right leg        History of Present Illness   Abhijeet Mccauley is a 57 year old male with the above past medical history now presents on 3/16/2018 with 2 episodes of recent bright red blood per rectum. First episode was yesterday morning, second episode this morning. On both occasions there was stool in with the blood, but he denies melena. He does have a history of melena but states his most recent stools were non-melenotic. He denies any abdominal pain or pain associated with defecation. He had nausea today with one episode vomiting but denies hematemesis. He denies any associated dizziness, falls, or syncope. He is a heavy alcohol drinker and has a history of withdrawal and seizures. He admits to 4-6 beers per day, last drink was yesterday afternoon.    He states that he was hospitalized at [facility] in January 2018 for melena and needed 3U blood while there. He had an EGD while there and \"had something clipped.\" He had a repeat EGD through [facility] on 2/8/18 without evidence of active bleeding. Patient is in the process of transferring his care from [facility] to the [facility] system now that he has moved to [place], [place]. He voices no other complaints or concerns at this time other than not wanting his blood drawn so often.        Prior to Admission Medications   Prior " to Admission Medications   Prescriptions Last Dose Informant Patient Reported? Taking?   Melatonin ER 5 MG TBCR 3/15/2018 at hs  Yes Yes   Sig: Take 1 tablet by mouth At Bedtime   PANTOPRAZOLE SODIUM PO 3/16/2018 at am  Yes Yes   Sig: Take 40 mg by mouth every morning (before breakfast)   SODIUM BICARBONATE PO 3/16/2018 at Unknown time Self Yes Yes   Sig: Take 650 mg by mouth daily as needed    TRAZODONE HCL PO 3/15/2018 at hs Self Yes Yes   Sig: Take 50 mg by mouth At Bedtime    acetaminophen (TYLENOL) 500 MG tablet Past Week at Unknown time Self Yes Yes   Sig: Take 1-2 tablets by mouth every 6 hours as needed.   albuterol (PROAIR HFA/PROVENTIL HFA/VENTOLIN HFA) 108 (90 BASE) MCG/ACT Inhaler More than a month at Unknown time  No No   Sig: Inhale 2 puffs into the lungs every 4 hours as needed   desonide (DESOWEN) 0.05 % lotion   No No   Sig: Apply topically as needed   furosemide (LASIX) 20 MG tablet 3/16/2018 at am  Yes Yes   Sig: Take 20 mg by mouth every morning    lactulose (CHRONULAC) 10 GM/15ML solution More than a month at Unknown time  Yes No   Sig: Take 6.7 g by mouth 2 times daily    loratadine (CLARITIN) 10 MG tablet 3/16/2018 at Unknown time Self No Yes   Sig: Take 1 tablet by mouth daily as needed for allergies.   mometasone-formoterol (DULERA) 200-5 MCG/ACT oral inhaler 3/16/2018 at am  Yes Yes   Sig: Inhale 2 puffs into the lungs 2 times daily    potassium chloride SA (K-DUR/KLOR-CON M) 10 MEQ CR tablet 3/16/2018 at am  Yes Yes   Sig: Take 1 tablet (10 mEq) by mouth daily   propranolol (INDERAL) 20 MG tablet 3/16/2018 at am  No Yes   Sig: Take 1 tablet (20 mg) by mouth 2 times daily   tiotropium (SPIRIVA) 18 MCG capsule 3/16/2018 at Unknown time  Yes Yes   Sig: Inhale 1 capsule into the lungs daily       Facility-Administered Medications: None     Allergies   Allergies   Allergen Reactions     Famotidine Unknown and Other (See Comments)     Severe abdominal cramps     Allergy      Ibuprofen      "Cyclobenzaprine      hives     Gabapentin      Made pt's \"face break out\"     Ibuprofen Dermatitis     Methocarbamol      Made pt's \"face break out\"     Motrin [Nsaids] Unknown and Hives     As reviewed in H/P of 3/2/12 Dr. Clint Plata Cramps     Severe abdominal cramps     Vancomycin      Other reaction(s): Other  hallucinations     Vfend      IV     Hydrocodone-Acetaminophen Rash       Family History    Family History   Problem Relation Age of Onset     Hypertension Mother      Coronary Artery Disease Father      MI       Social History   Social History     Social History     Marital status: Single     Spouse name: N/A     Number of children: N/A     Years of education: N/A     Occupational History     Not on file.     Social History Main Topics     Smoking status: Current Every Day Smoker     Packs/day: 0.50     Years: 30.00     Types: Cigarettes     Smokeless tobacco: Never Used      Comment: declined quit plan     Alcohol use Yes      Comment: 6-7 beers per day.  Sometimes a cocktail also.     Drug use: Yes     Special: Marijuana     Sexual activity: Not on file     Other Topics Concern     Not on file     Social History Narrative       Review of Systems     General: Denies fever, chills, recent illness, weight changes, loss of appetite, or fatigue.  HEENT: Denies vertigo, vision changes, nasal congestion.  Pulmonary: Denies coughing, wheezing, shortness of breath.  Cardiovascular: Denies chest pain, palpitations, claudication, edema.  Gastrointestinal: See HPI regarding bright red blood per rectum, nausea, vomiting.  Denies abdominal pain, diarrhea, constipation, melena.  Genitourinary: Denies frequency, dysuria, and hematuria.  Neurologic: Denies dizziness, lightheadedness, fainting, falls, numbness, tingling, weakness, headache.   Musculoskeletal: Denies joint and muscle pains.   Skin: Denies rashes, unexplained bruising, lesions.   Endocrinology: Denies excessive thirst, urination, sweating, and heat " "or cold intolerance.      Physical Exam   /48 (BP Location: Right arm)  Pulse 77  Temp 98.5  F (36.9  C) (Oral)  Resp 20  Ht 1.676 m (5' 6\")  Wt 83.9 kg (184 lb 15.5 oz)  SpO2 97%  BMI 29.85 kg/m2     Weight: 184 lbs 15.46 oz Body mass index is 29.85 kg/(m^2).     Constitutional: Alert, oriented, cooperative, no apparent distress, appears nontoxic, speaking in full sentences.     Eyes: Eyes are clear, pupils are reactive. No scleral icterus. Extroccular movements intact.     HEENT: Oropharynx is clear and moist, no lesions. Normocephalic, no evidence of cranial trauma.      Cardiovascular: Regular rhythm and rate, normal S1 and S2. No murmur, rubs, or gallops. Peripheral pulses in tact bilaterally. No lower extremity edema.     Respiratory: Lung sounds are clear to auscultation bilaterally without wheezes, rhonchi, or crackles.    GI: Soft, non-distended. Non-tender, no rebound or guarding. No hepatosplenomegaly or masses appreciated. Normal bowel sounds.    Musculoskeletal: Without obvious deformity, normal range of motion. Normal muscle bulk and tone.     Skin: Warm and dry, no rashes or ecchymoses. No mottling of skin.     Neurologic: Patient moves all extremities. Cranial nerves are grossly intact.  is symmetric. Gross strength and sensation are equal bilaterally. No tongue fasciculations, non-tremulous.     Genitourinary: Deferred    Data   Data reviewed today:     Recent Labs  Lab 03/16/18 2050 03/16/18  1425   WBC  --  14.6*   HGB 6.6* 7.4*   MCV  --  85   PLT  --  179   INR  --  1.34*   NA  --  139   POTASSIUM  --  4.0   CHLORIDE  --  108   CO2  --  19*   BUN  --  18   CR  --  1.80*   ANIONGAP  --  12   BEE  --  8.2*   GLC  --  81   ALBUMIN  --  3.0*   PROTTOTAL  --  7.3   BILITOTAL  --  1.8*   ALKPHOS  --  247*   ALT  --  23   AST  --  35   LIPASE  --  177       No results found for this or any previous visit (from the past 24 hour(s)).    I personally reviewed no images or EKG's " today.    Jessy Bishop PA-C  [facility] Hospitalist [facility]  Pager: [phone]

## 2018-03-17 NOTE — PROGRESS NOTES
This nurse was not able to draw off of the IV, but was able to use a butterfly and obtain specimen from right AC.

## 2018-03-17 NOTE — PROGRESS NOTES
Patient called nurse to report that he was very cold and wanted more blankets. He was noted to be actually shivering and shaking profusely. He denied soa,nausea, back pain, abdominal pain, headache,etc. Temp was 98.5 orally, which is what it was while blood was infusing, Heart rate 70's BP slightly lower, but was difficult to take as patient was shaking, O2 sat 98% on room air, RR  20. IV fluid stopped. Patient given additional warm blankets and temp in room increased. This reaction was different than when Banana bag was infusing, as he did not have chills. Recheck VS shortly.

## 2018-03-17 NOTE — PROGRESS NOTES
Start prep today at noon for upper and lower endoscopy tomorrow.    1200   2 dulcolax tablets  1500 - 1900   Miralax/Gatoraide solution  0500   Magnesium citrate    Raz Cobb MD

## 2018-03-17 NOTE — PROGRESS NOTES
Patient refusing lab draw and wants it drawn off the one IV in his hand that he came up with from ED. Nurse will attempt to draw.

## 2018-03-17 NOTE — PROGRESS NOTES
"[facility]      Subjective:  Acute fever, N and V earlier-symptoms resolved  One tan stool surrounded by blood overnight    Review of Systems:  CONSTITUTIONAL: NEGATIVE for fever, chills, change in weight  INTEGUMENTARY/SKIN: NEGATIVE for worrisome rashes, moles or lesions  EYES: NEGATIVE for vision changes or irritation  ENT/MOUTH: NEGATIVE for ear, mouth and throat problems  RESP: NEGATIVE for significant cough or SOB  BREAST: NEGATIVE for masses, tenderness or discharge  CV: NEGATIVE for chest pain, palpitations or peripheral edema  GI: NEGATIVE for nausea, abdominal pain, heartburn, or change in bowel habits  : NEGATIVE for frequency, dysuria, or hematuria  MUSCULOSKELETAL: NEGATIVE for significant arthralgias or myalgia  NEURO: NEGATIVE for weakness, dizziness or paresthesias  ENDOCRINE: NEGATIVE for temperature intolerance, skin/hair changes  HEME: NEGATIVE for bleeding problems  PSYCHIATRIC: NEGATIVE for changes in mood or affect    Physical Exam:  Vitals Were Reviewed    Patient Vitals for the past 16 hrs:   BP Temp Temp src Pulse Heart Rate Resp SpO2 Height Weight   03/17/18 0654 - 101.9  F (38.8  C) Oral - - - - - -   03/17/18 0515 134/64 102.6  F (39.2  C) Oral - 87 20 98 % - -   03/17/18 0440 - 102.2  F (39  C) Oral - - - 99 % - -   03/17/18 0400 90/72 98.5  F (36.9  C) - - 79 20 98 % - -   03/17/18 0259 116/53 98.5  F (36.9  C) Oral - 72 16 97 % - -   03/17/18 0149 130/49 98.7  F (37.1  C) Oral - 71 16 98 % - -   03/17/18 0043 123/50 98.3  F (36.8  C) Oral - 74 18 - - -   03/17/18 0015 111/46 98.8  F (37.1  C) Oral 79 - 18 98 % - -   03/16/18 2331 - - - - 76 - - - -   03/16/18 2256 125/51 98.6  F (37  C) Oral 73 - 18 98 % - -   03/16/18 1929 - - - - 75 - - - -   03/16/18 1926 134/48 98.5  F (36.9  C) Oral 77 - 20 97 % - -   03/16/18 1815 148/68 98  F (36.7  C) Oral - 76 20 98 % - -   03/16/18 1752 - 98.3  F (36.8  C) Oral 79 - 20 99 % 1.676 m (5' 6\") 83.9 kg (184 lb 15.5 oz) "   03/16/18 1729 - - - - 72 28 97 % - -   03/16/18 1715 - - - - 74 (!) 37 98 % - -   03/16/18 1700 143/78 - - - - - - - -   03/16/18 1640 133/70 - - - 78 29 98 % - -   03/16/18 1630 - - - - 76 24 100 % - -   03/16/18 1620 117/63 - - - 72 26 99 % - -   03/16/18 1608 130/62 - - - 75 (!) 33 100 % - -   03/16/18 1545 - - - - - - 99 % - -   03/16/18 1543 116/66 - - - - - 98 % - -   03/16/18 1540 109/59 - - - - - 99 % - -   03/16/18 1533 - - - - - - 99 % - -   03/16/18 1532 112/72 - - - - - - - -         Intake/Output Summary (Last 24 hours) at 03/17/18 0704  Last data filed at 03/17/18 0607   Gross per 24 hour   Intake             1180 ml   Output              250 ml   Net              930 ml       GENERAL APPEARANCE: healthy, alert and no distress  EYES: conjunctiva clear, eyes grossly normal  RESP: lungs clear to auscultation - no rales, rhonchi or wheezes  CV: regular rate and rhythm, normal S1 S2, no S3 or S4 and no murmur, click or rub   ABDOMEN: soft, nontender, no HSM or masses and bowel sounds normal  MS: no clubbing, cyanosis; no edema  SKIN: clear without significant rashes or lesions  NEURO: Normal strength and tone, sensory exam grossly normal, mentation intact and speech normal    Lab:  Recent Labs   Lab Test  03/16/18   1425  01/24/18   1424  11/13/17   1510   NA  139   --   138   POTASSIUM  4.0   --   4.1   CHLORIDE  108   --   107   CO2  19*   --   18*   ANIONGAP  12   --   13   GLC  81   --   76   BUN  18   --   27   CR  1.80*  2.15*  2.67*   BEE  8.2*   --   8.7     CBC RESULTS:   Recent Labs   Lab Test  03/17/18   0542  03/16/18   2050  03/16/18   1425   WBC  8.8   --   14.6*   RBC  3.01*   --   2.84*   HGB  8.0*  6.6*  7.4*   HCT  25.7*   --   24.1*   PLT  157   --   179       Results for orders placed or performed during the hospital encounter of 03/16/18 (from the past 24 hour(s))   CBC with platelets differential   Result Value Ref Range    WBC 14.6 (H) 4.0 - 11.0 10e9/L    RBC Count 2.84 (L) 4.4 -  5.9 10e12/L    Hemoglobin 7.4 (L) 13.3 - 17.7 g/dL    Hematocrit 24.1 (L) 40.0 - 53.0 %    MCV 85 78 - 100 fl    MCH 26.1 (L) 26.5 - 33.0 pg    MCHC 30.7 (L) 31.5 - 36.5 g/dL    RDW 19.1 (H) 10.0 - 15.0 %    Platelet Count 179 150 - 450 10e9/L    Diff Method Automated Method     % Neutrophils 68.7 %    % Lymphocytes 10.5 %    % Monocytes 18.4 %    % Eosinophils 1.8 %    % Basophils 0.3 %    % Immature Granulocytes 0.3 %    Absolute Neutrophil 10.0 (H) 1.6 - 8.3 10e9/L    Absolute Lymphocytes 1.5 0.8 - 5.3 10e9/L    Absolute Monocytes 2.7 (H) 0.0 - 1.3 10e9/L    Absolute Eosinophils 0.3 0.0 - 0.7 10e9/L    Absolute Basophils 0.0 0.0 - 0.2 10e9/L    Abs Immature Granulocytes 0.1 0 - 0.4 10e9/L   INR   Result Value Ref Range    INR 1.34 (H) 0.86 - 1.14   Comprehensive metabolic panel   Result Value Ref Range    Sodium 139 133 - 144 mmol/L    Potassium 4.0 3.4 - 5.3 mmol/L    Chloride 108 94 - 109 mmol/L    Carbon Dioxide 19 (L) 20 - 32 mmol/L    Anion Gap 12 3 - 14 mmol/L    Glucose 81 70 - 99 mg/dL    Urea Nitrogen 18 7 - 30 mg/dL    Creatinine 1.80 (H) 0.66 - 1.25 mg/dL    GFR Estimate 39 (L) >60 mL/min/1.7m2    GFR Estimate If Black 47 (L) >60 mL/min/1.7m2    Calcium 8.2 (L) 8.5 - 10.1 mg/dL    Bilirubin Total 1.8 (H) 0.2 - 1.3 mg/dL    Albumin 3.0 (L) 3.4 - 5.0 g/dL    Protein Total 7.3 6.8 - 8.8 g/dL    Alkaline Phosphatase 247 (H) 40 - 150 U/L    ALT 23 0 - 70 U/L    AST 35 0 - 45 U/L   Lipase   Result Value Ref Range    Lipase 177 73 - 393 U/L   Partial thromboplastin time   Result Value Ref Range    PTT 33 22 - 37 sec   Alcohol level blood   Result Value Ref Range    Ethanol g/dL 0.04 (H) <0.01 g/dL   ABO/Rh type and screen   Result Value Ref Range    Units Ordered 2     ABO O     RH(D) Pos     Antibody Screen Pos (A)     Test Valid Only At [facility]        Specimen Expires 03/19/2018     Crossmatch Red Blood Cells     Blood Bank Comment       Delay in availability of Red Blood Cells called  to  ESTUARDO MALONE SURG RN 03/16/2018 1530 YW.  Sent to Reference Lab     Blood component   Result Value Ref Range    Unit Number [id number]     Blood Component Type Red Blood Cells Leukocyte Reduced     Division Number 00     Status of Unit Released to care unit 03/17/2018 0013     Blood Product Code B8543A48     Unit Status ISS    Blood component   Result Value Ref Range    Unit Number [id number]     Blood Component Type Red Blood Cells Leukocyte Reduced     Division Number 00     Status of Unit Ready for patient 03/17/2018 0121     Blood Product Code [id number]     Unit Status JUNO    Lactic acid level STAT   Result Value Ref Range    Lactic Acid 1.5 0.7 - 2.0 mmol/L   Hemoglobin   Result Value Ref Range    Hemoglobin 6.6 (LL) 13.3 - 17.7 g/dL   CBC with platelets differential   Result Value Ref Range    WBC 8.8 4.0 - 11.0 10e9/L    RBC Count 3.01 (L) 4.4 - 5.9 10e12/L    Hemoglobin 8.0 (L) 13.3 - 17.7 g/dL    Hematocrit 25.7 (L) 40.0 - 53.0 %    MCV 85 78 - 100 fl    MCH 26.6 26.5 - 33.0 pg    MCHC 31.1 (L) 31.5 - 36.5 g/dL    RDW 18.2 (H) 10.0 - 15.0 %    Platelet Count 157 150 - 450 10e9/L    Diff Method Automated Method     % Neutrophils 90.1 %    % Lymphocytes 4.0 %    % Monocytes 3.5 %    % Eosinophils 1.6 %    % Basophils 0.2 %    % Immature Granulocytes 0.6 %    Absolute Neutrophil 8.0 1.6 - 8.3 10e9/L    Absolute Lymphocytes 0.4 (L) 0.8 - 5.3 10e9/L    Absolute Monocytes 0.3 0.0 - 1.3 10e9/L    Absolute Eosinophils 0.1 0.0 - 0.7 10e9/L    Absolute Basophils 0.0 0.0 - 0.2 10e9/L    Abs Immature Granulocytes 0.1 0 - 0.4 10e9/L   Transfusion Rxn Blood Bank Notification   Result Value Ref Range    Transfusion Rxn Blood Bank Notification Order received        Assessment and Plan:    Abhijeet Mccauley is a 57 year old male with a past medical history significant for PUD and GI bleeding, chronic kidney disease, AML s/p chemo now in remission, alcohol dependence, alcoholic liver cirrhosis with coagulation  defect, ulcerative colitis, copd, tobacco dependence who presents on 3/16/2018 with bright red blood per rectum likely due to a lower GI bleed.        GI bleed, likely lower source / bright red blood per rectum  Occurs in setting of known PUD, history of previous GI bleed, alcohol dependence, and history of varices s/p banding. Was recently hospitalized at [facility] in Jan 2018 for melena, required 3U PRBC, underwent EGD showing grade 2 varices which were banded. EGD here since showed no obvious varices. Presented on 3/16/18 with hemoglobin 7.4 after 2 episodes of bright red blood per rectum. Hemodynamically stable upon admission.  On q 6 hour hgb, protonix bid iv  Plans for colonoscopy and EGD by general surgery on Sunday after prep  One stool tan with some blood around it overnight, one unit of blood given (it was not irradiated per pathology) -now hgb 8.0    Acute fever and vomiting overnight--can't ro transfusion reaction  No difficulty breathing, rash or hypotension  Transfusion lab work up pending, blood cutures obtained, normal wbc, feels ok now        Uncomplicated alcohol dependence (H)  History of withdrawal and seizures in the past. Presented with alcohol level 0.04. No evidence of withdrawal on exam, although potentially at risk for withdrawing.  - CIWA protocol  - Prn Ativan     Alcoholic cirrhosis of liver (H) / Coagulation defect (H)  Previously diagnosed. LFTs normal. INR 1.34, chronically elevated (not on pharmacologic anticoagulation). Is prescribed lactulose but has not been taking - defer restart to day team.     Leukocytosis  Resolved 03/17/18      Essential hypertension  Previously diagnosed, pressures stable during emergency department course. Taking propranolol and lasix prior to admission - hold lasix, continue propranolol with holding parameters.        Acute myeloid leukemia in remission (H)  Diagnosed in 2008, s/p chemo, did not require bone marrow transplant. Officially in remission  since 2012. Treated at [facility]. As above, OK to received irradiated blood products per pathology after review.     Chronic kidney disease  Previously diagnosed. Presented with creatinine 1.80, baseline between 1.9-2.1.   Creat 1.77 03/17/18         Universal ulcerative (chronic) colitis(556.6) (H)  Per chart review. Patient not taking any active medications for this currently.        Mild intermittent asthma without complication  Stable respiratory status. Taking Dulera, Spiriva, and albuterol prior to admission - med rec pending, Dulera & Spiriva ordered. Albuterol nebs ordered.     Tobacco dependence syndrome  Encourage cessation. Nicotine replacement available.           Fluids: IV NS @ 50  Electrolytes: Monitor  Nutrition: Clear liquid     DVT Prophylaxis: Pneumatic Compression Devices, no pharmacologic anticoagulation due to GI bleed  Code Status: Full Code - discussed directly with patient     Lines: Peripheral  Chong catheter: Not indicated     Disposition  Plan is for scopes tomorrow  Prep today  Follow hgb  Await transfusion reaction blood panel  Enteric pathogens      10:10 AM   UA suggested uti  Transfusion reaction lab moreno-pending  Start rocephin and check bladder scan.    1:40 PM   Mckinley was neg  Pathologist did not feel this was a hemolytic transfusion reaction  Will get q 6 hour hgb, next at 6 pm--consider transfusion if under 7

## 2018-03-17 NOTE — PLAN OF CARE
Problem: Patient Care Overview  Goal: Plan of Care/Patient Progress Review  Outcome: No Change  VSS, fever has resolved so far this afternoon with no further chills, nausea, or vomiting.  Does have a UTI so started on IV antibiotics for this.  Is starting bowel prep regimen this afternoon for planned upper and lower GI endoscopies tomorrow morning.  Patient has been up to bathroom frequently with runny tan and blood tinged stools, some stool incontinence at times as well.  IV now just S/L'd per request of patient and MD approval.  Will continue to monitor.

## 2018-03-17 NOTE — PROGRESS NOTES
"Patient completed and tolerated 1 unit of PRBC without reaction. Will recheck Hgb in 1.5 hours. Patient was then restarted on the banana bag and immediately said he did not feel \"right\". He said he got a funny feeling in his chest, an immediate headache and he could feel it \"in my spine\". This had previously happened when bag was started for a few minutes before blood was ready. Pharmacy consulted. Banana bag stopped and saline infused. Patient immediately felt \"relief\". Vital signs stable. Patient agreed to call nurse if he has return of any symptoms.   "

## 2018-03-17 NOTE — PROGRESS NOTES
Patient says he is feeling better, although still cold. All lab work has been obtained including blood cultures times 2. UA has not been obtained yet. .

## 2018-03-18 ENCOUNTER — ANESTHESIA (OUTPATIENT)
Dept: GASTROENTEROLOGY | Facility: CLINIC | Age: 58
DRG: 378 | End: 2018-03-18
Payer: COMMERCIAL

## 2018-03-18 VITALS
DIASTOLIC BLOOD PRESSURE: 40 MMHG | RESPIRATION RATE: 16 BRPM | SYSTOLIC BLOOD PRESSURE: 133 MMHG | WEIGHT: 184.97 LBS | OXYGEN SATURATION: 99 % | HEIGHT: 66 IN | HEART RATE: 69 BPM | BODY MASS INDEX: 29.73 KG/M2 | TEMPERATURE: 97.8 F

## 2018-03-18 LAB
ALBUMIN SERPL-MCNC: 2.7 G/DL (ref 3.4–5)
ALP SERPL-CCNC: 215 U/L (ref 40–150)
ALT SERPL W P-5'-P-CCNC: 22 U/L (ref 0–70)
ANION GAP SERPL CALCULATED.3IONS-SCNC: 11 MMOL/L (ref 3–14)
APTT PPP: 40 SEC (ref 22–37)
AST SERPL W P-5'-P-CCNC: 29 U/L (ref 0–45)
BILIRUB SERPL-MCNC: 2 MG/DL (ref 0.2–1.3)
BLOOD BANK CMNT PATIENT-IMP: NORMAL
BUN SERPL-MCNC: 18 MG/DL (ref 7–30)
C COLI+JEJUNI+LARI FUSA STL QL NAA+PROBE: NOT DETECTED
CALCIUM SERPL-MCNC: 8.4 MG/DL (ref 8.5–10.1)
CHLORIDE SERPL-SCNC: 111 MMOL/L (ref 94–109)
CO2 SERPL-SCNC: 18 MMOL/L (ref 20–32)
COLONOSCOPY: NORMAL
CREAT SERPL-MCNC: 2 MG/DL (ref 0.66–1.25)
DAT IGG-SP REAG RBC-IMP: NORMAL
DAT POLY-SP REAG RBC QL: NORMAL
EC STX1 GENE STL QL NAA+PROBE: NOT DETECTED
EC STX2 GENE STL QL NAA+PROBE: NOT DETECTED
ENTERIC PATHOGEN COMMENT: NORMAL
ERYTHROCYTE [DISTWIDTH] IN BLOOD BY AUTOMATED COUNT: 18.7 % (ref 10–15)
GFR SERPL CREATININE-BSD FRML MDRD: 34 ML/MIN/1.7M2
GLUCOSE BLDC GLUCOMTR-MCNC: 92 MG/DL (ref 70–99)
GLUCOSE SERPL-MCNC: 83 MG/DL (ref 70–99)
HCT VFR BLD AUTO: 25.7 % (ref 40–53)
HGB BLD-MCNC: 6.9 G/DL (ref 13.3–17.7)
HGB BLD-MCNC: 7.3 G/DL (ref 13.3–17.7)
HGB BLD-MCNC: 7.8 G/DL (ref 13.3–17.7)
INR PPP: 1.6 (ref 0.86–1.14)
LIPASE SERPL-CCNC: 202 U/L (ref 73–393)
MCH RBC QN AUTO: 26.2 PG (ref 26.5–33)
MCHC RBC AUTO-ENTMCNC: 30.4 G/DL (ref 31.5–36.5)
MCV RBC AUTO: 86 FL (ref 78–100)
NOROV GI+II ORF1-ORF2 JNC STL QL NAA+PR: NOT DETECTED
NT-PROBNP SERPL-MCNC: 1103 PG/ML (ref 0–900)
PLATELET # BLD AUTO: 176 10E9/L (ref 150–450)
POTASSIUM SERPL-SCNC: 3.6 MMOL/L (ref 3.4–5.3)
PROT SERPL-MCNC: 6.6 G/DL (ref 6.8–8.8)
RBC # BLD AUTO: 2.98 10E12/L (ref 4.4–5.9)
RVA NSP5 STL QL NAA+PROBE: NOT DETECTED
SALMONELLA SP RPOD STL QL NAA+PROBE: NOT DETECTED
SHIGELLA SP+EIEC IPAH STL QL NAA+PROBE: NOT DETECTED
SODIUM SERPL-SCNC: 140 MMOL/L (ref 133–144)
UPPER GI ENDOSCOPY: NORMAL
V CHOL+PARA RFBL+TRKH+TNAA STL QL NAA+PR: NOT DETECTED
WBC # BLD AUTO: 12.6 10E9/L (ref 4–11)
Y ENTERO RECN STL QL NAA+PROBE: NOT DETECTED

## 2018-03-18 PROCEDURE — 85610 PROTHROMBIN TIME: CPT | Performed by: FAMILY MEDICINE

## 2018-03-18 PROCEDURE — 25000125 ZZHC RX 250: Performed by: SURGERY

## 2018-03-18 PROCEDURE — 25000128 H RX IP 250 OP 636: Performed by: FAMILY MEDICINE

## 2018-03-18 PROCEDURE — 37000008 ZZH ANESTHESIA TECHNICAL FEE, 1ST 30 MIN: Performed by: SURGERY

## 2018-03-18 PROCEDURE — 99239 HOSP IP/OBS DSCHRG MGMT >30: CPT | Performed by: FAMILY MEDICINE

## 2018-03-18 PROCEDURE — 85730 THROMBOPLASTIN TIME PARTIAL: CPT | Performed by: FAMILY MEDICINE

## 2018-03-18 PROCEDURE — 85027 COMPLETE CBC AUTOMATED: CPT | Performed by: FAMILY MEDICINE

## 2018-03-18 PROCEDURE — 45384 COLONOSCOPY W/LESION REMOVAL: CPT | Mod: 59 | Performed by: SURGERY

## 2018-03-18 PROCEDURE — 88305 TISSUE EXAM BY PATHOLOGIST: CPT | Performed by: SURGERY

## 2018-03-18 PROCEDURE — 45385 COLONOSCOPY W/LESION REMOVAL: CPT | Performed by: SURGERY

## 2018-03-18 PROCEDURE — 0DBP8ZZ EXCISION OF RECTUM, VIA NATURAL OR ARTIFICIAL OPENING ENDOSCOPIC: ICD-10-PCS | Performed by: SURGERY

## 2018-03-18 PROCEDURE — 25000132 ZZH RX MED GY IP 250 OP 250 PS 637: Performed by: SURGERY

## 2018-03-18 PROCEDURE — 83690 ASSAY OF LIPASE: CPT | Performed by: FAMILY MEDICINE

## 2018-03-18 PROCEDURE — 0DJ08ZZ INSPECTION OF UPPER INTESTINAL TRACT, VIA NATURAL OR ARTIFICIAL OPENING ENDOSCOPIC: ICD-10-PCS | Performed by: SURGERY

## 2018-03-18 PROCEDURE — 88305 TISSUE EXAM BY PATHOLOGIST: CPT | Mod: 26 | Performed by: SURGERY

## 2018-03-18 PROCEDURE — 25000132 ZZH RX MED GY IP 250 OP 250 PS 637: Performed by: PHYSICIAN ASSISTANT

## 2018-03-18 PROCEDURE — 86880 COOMBS TEST DIRECT: CPT | Performed by: FAMILY MEDICINE

## 2018-03-18 PROCEDURE — 80053 COMPREHEN METABOLIC PANEL: CPT | Performed by: FAMILY MEDICINE

## 2018-03-18 PROCEDURE — 25000128 H RX IP 250 OP 636: Performed by: NURSE ANESTHETIST, CERTIFIED REGISTERED

## 2018-03-18 PROCEDURE — 25000132 ZZH RX MED GY IP 250 OP 250 PS 637: Performed by: FAMILY MEDICINE

## 2018-03-18 PROCEDURE — 25000125 ZZHC RX 250: Performed by: NURSE ANESTHETIST, CERTIFIED REGISTERED

## 2018-03-18 PROCEDURE — 85018 HEMOGLOBIN: CPT | Performed by: FAMILY MEDICINE

## 2018-03-18 PROCEDURE — 40000275 ZZH STATISTIC RCP TIME EA 10 MIN

## 2018-03-18 PROCEDURE — 83880 ASSAY OF NATRIURETIC PEPTIDE: CPT | Performed by: FAMILY MEDICINE

## 2018-03-18 PROCEDURE — 43235 EGD DIAGNOSTIC BRUSH WASH: CPT | Performed by: SURGERY

## 2018-03-18 PROCEDURE — 25000125 ZZHC RX 250: Performed by: PHYSICIAN ASSISTANT

## 2018-03-18 PROCEDURE — 45384 COLONOSCOPY W/LESION REMOVAL: CPT | Performed by: SURGERY

## 2018-03-18 PROCEDURE — 36415 COLL VENOUS BLD VENIPUNCTURE: CPT | Performed by: FAMILY MEDICINE

## 2018-03-18 PROCEDURE — 43235 EGD DIAGNOSTIC BRUSH WASH: CPT | Mod: 51 | Performed by: SURGERY

## 2018-03-18 PROCEDURE — 0DBM8ZZ EXCISION OF DESCENDING COLON, VIA NATURAL OR ARTIFICIAL OPENING ENDOSCOPIC: ICD-10-PCS | Performed by: SURGERY

## 2018-03-18 PROCEDURE — 00000146 ZZHCL STATISTIC GLUCOSE BY METER IP

## 2018-03-18 RX ORDER — GLYCOPYRROLATE 0.2 MG/ML
INJECTION, SOLUTION INTRAMUSCULAR; INTRAVENOUS PRN
Status: DISCONTINUED | OUTPATIENT
Start: 2018-03-18 | End: 2018-03-18

## 2018-03-18 RX ORDER — SULFAMETHOXAZOLE/TRIMETHOPRIM 800-160 MG
1 TABLET ORAL 2 TIMES DAILY
Qty: 14 TABLET | Refills: 0 | Status: SHIPPED | OUTPATIENT
Start: 2018-03-18 | End: 2018-05-02

## 2018-03-18 RX ORDER — LIDOCAINE HYDROCHLORIDE 10 MG/ML
INJECTION, SOLUTION INFILTRATION; PERINEURAL PRN
Status: DISCONTINUED | OUTPATIENT
Start: 2018-03-18 | End: 2018-03-18

## 2018-03-18 RX ORDER — SODIUM CHLORIDE, SODIUM LACTATE, POTASSIUM CHLORIDE, CALCIUM CHLORIDE 600; 310; 30; 20 MG/100ML; MG/100ML; MG/100ML; MG/100ML
INJECTION, SOLUTION INTRAVENOUS CONTINUOUS PRN
Status: DISCONTINUED | OUTPATIENT
Start: 2018-03-18 | End: 2018-03-18

## 2018-03-18 RX ORDER — PROPOFOL 10 MG/ML
INJECTION, EMULSION INTRAVENOUS CONTINUOUS PRN
Status: DISCONTINUED | OUTPATIENT
Start: 2018-03-18 | End: 2018-03-18

## 2018-03-18 RX ADMIN — LIDOCAINE HYDROCHLORIDE 50 MG: 10 INJECTION, SOLUTION INFILTRATION; PERINEURAL at 09:29

## 2018-03-18 RX ADMIN — PROPOFOL 200 MCG/KG/MIN: 10 INJECTION, EMULSION INTRAVENOUS at 09:30

## 2018-03-18 RX ADMIN — CEFTRIAXONE SODIUM 1 G: 1 INJECTION, SOLUTION INTRAVENOUS at 11:03

## 2018-03-18 RX ADMIN — LACTULOSE 10 ML: 20 SOLUTION ORAL at 10:47

## 2018-03-18 RX ADMIN — MAGESIUM CITRATE 296 ML: 1.75 LIQUID ORAL at 04:41

## 2018-03-18 RX ADMIN — FOLIC ACID 1 MG: 1 TABLET ORAL at 10:46

## 2018-03-18 RX ADMIN — UMECLIDINIUM 62.5 MCG: 62.5 AEROSOL, POWDER ORAL at 07:54

## 2018-03-18 RX ADMIN — GLYCOPYRROLATE 0.2 MG: 0.2 INJECTION, SOLUTION INTRAMUSCULAR; INTRAVENOUS at 09:29

## 2018-03-18 RX ADMIN — PROPRANOLOL HYDROCHLORIDE 20 MG: 20 TABLET ORAL at 10:47

## 2018-03-18 RX ADMIN — SODIUM CHLORIDE, POTASSIUM CHLORIDE, SODIUM LACTATE AND CALCIUM CHLORIDE: 600; 310; 30; 20 INJECTION, SOLUTION INTRAVENOUS at 09:28

## 2018-03-18 RX ADMIN — ACETAMINOPHEN 975 MG: 325 TABLET, FILM COATED ORAL at 11:05

## 2018-03-18 RX ADMIN — TOPICAL ANESTHETIC 1 SPRAY: 200 SPRAY DENTAL; PERIODONTAL at 09:29

## 2018-03-18 RX ADMIN — Medication 100 MG: at 10:46

## 2018-03-18 RX ADMIN — FLUTICASONE FUROATE AND VILANTEROL TRIFENATATE 1 PUFF: 200; 25 POWDER RESPIRATORY (INHALATION) at 07:54

## 2018-03-18 RX ADMIN — MULTIPLE VITAMINS W/ MINERALS TAB 1 TABLET: TAB at 10:47

## 2018-03-18 RX ADMIN — PANTOPRAZOLE SODIUM 40 MG: 40 INJECTION, POWDER, FOR SOLUTION INTRAVENOUS at 07:55

## 2018-03-18 RX ADMIN — ACETAMINOPHEN 1000 MG: 500 TABLET ORAL at 04:47

## 2018-03-18 NOTE — PLAN OF CARE
"Problem: Gastrointestinal Condition Comorbidity  Goal: Gastrointestinal Condition  Patient comorbidity will be monitored for signs and symptoms of Gastrointestinal condition.  Problems will be absent, minimized or managed by discharge/transition of care.   Outcome: No Change  Patient alert, oriented, independent. IV saline locked. Prn tylenol given for neck and back pain. Pt stools clear. No blood in stools. Denies nausea, SOB. LS clear. NPO since midnight. BP 99/40 (BP Location: Left arm)  Pulse 70  Temp 98.6  F (37  C) (Oral)  Resp 18  Ht 1.676 m (5' 6\")  Wt 83.9 kg (184 lb 15.5 oz)  SpO2 98%  BMI 29.85 kg/m2        "

## 2018-03-18 NOTE — OP NOTE
EGD and Colonoscopy - normal esophagus, stomach, and duodenum.  No Candida detected. Colon with a few small polyps removed. No bleeding sources identified.

## 2018-03-18 NOTE — ANESTHESIA CARE TRANSFER NOTE
Patient: Abhijeet Mccauley    Procedure(s):   - Wound Class: II-Clean Contaminated   - Wound Class: II-Clean Contaminated   - Wound Class: II-Clean Contaminated    Diagnosis: .  Diagnosis Additional Information: No value filed.    Anesthesia Type:   MAC     Note:    Patient transferred to:Phase II  Handoff Report: Identifed the Patient, Identified the Reponsible Provider, Reviewed the pertinent medical history, Discussed the surgical course, Reviewed Intra-OP anesthesia mangement and issues during anesthesia, Set expectations for post-procedure period and Allowed opportunity for questions and acknowledgement of understanding      Vitals: (Last set prior to Anesthesia Care Transfer)    CRNA VITALS  3/18/2018 0927 - 3/18/2018 0957      3/18/2018             Pulse: 73    SpO2: 100 %                Electronically Signed By: Edinson Mays CRNA, APRN CRNA  March 18, 2018  9:57 AM

## 2018-03-18 NOTE — PLAN OF CARE
"Problem: Patient Care Overview  Goal: Plan of Care/Patient Progress Review  Outcome: Completed Date Met: 03/18/18  VSS, lungs clear, HR regular, patient s/p endoscopies, did not find anything, no longer having bloody stools.  Hgb has stabilized, c/o a mild \"gut ache\" since the procedure, denies N/V.  Orders to discharge patient in chart.        "

## 2018-03-18 NOTE — PROGRESS NOTES
[facility] DISCHARGE NOTE    Patient discharged to home at 12:45 PM via wheel chair. Accompanied by mother and staff. Discharge instructions reviewed with patient, opportunity offered to ask questions. Prescriptions filled and sent with patient upon discharge. All belongings sent with patient.    Nohemy Feng

## 2018-03-18 NOTE — PROGRESS NOTES
[facility]      Subjective:  Feels good  No blood with prep  Wants to leave  No uti symptoms    Review of Systems:  CONSTITUTIONAL: NEGATIVE for fever, chills, change in weight  INTEGUMENTARY/SKIN: NEGATIVE for worrisome rashes, moles or lesions  EYES: NEGATIVE for vision changes or irritation  ENT/MOUTH: NEGATIVE for ear, mouth and throat problems  RESP: NEGATIVE for significant cough or SOB  BREAST: NEGATIVE for masses, tenderness or discharge  CV: NEGATIVE for chest pain, palpitations or peripheral edema  GI: NEGATIVE for nausea, abdominal pain, heartburn, or change in bowel habits  : NEGATIVE for frequency, dysuria, or hematuria  MUSCULOSKELETAL: NEGATIVE for significant arthralgias or myalgia  NEURO: NEGATIVE for weakness, dizziness or paresthesias  ENDOCRINE: NEGATIVE for temperature intolerance, skin/hair changes  HEME: NEGATIVE for bleeding problems  PSYCHIATRIC: NEGATIVE for changes in mood or affect    Physical Exam:  Vitals Were Reviewed    Patient Vitals for the past 16 hrs:   BP Temp Temp src Pulse Resp SpO2   03/18/18 0721 99/40 98.6  F (37  C) Oral 70 18 98 %   03/18/18 0438 113/49 98.9  F (37.2  C) Oral 72 18 97 %   03/18/18 0306 131/54 98.6  F (37  C) Oral 77 18 96 %   03/17/18 2359 112/53 98.6  F (37  C) Oral 69 18 96 %   03/17/18 2007 101/49 98.1  F (36.7  C) Oral 70 18 98 %         Intake/Output Summary (Last 24 hours) at 03/18/18 0907  Last data filed at 03/17/18 0945   Gross per 24 hour   Intake              420 ml   Output              300 ml   Net              120 ml       GENERAL APPEARANCE: healthy, alert and no distress  EYES: conjunctiva clear, eyes grossly normal  RESP: lungs clear to auscultation - no rales, rhonchi or wheezes  CV: regular rate and rhythm, normal S1 S2, no S3 or S4 and no murmur, click or rub   ABDOMEN: soft, nontender, no HSM or masses and bowel sounds normal  MS: no clubbing, cyanosis; no edema  SKIN: clear without significant rashes or  lesions  NEURO: Normal strength and tone, sensory exam grossly normal, mentation intact and speech normal    Lab:  Recent Labs   Lab Test  03/18/18   0624  03/17/18   0542   NA  140  140   POTASSIUM  3.6  4.1   CHLORIDE  111*  109   CO2  18*  19*   ANIONGAP  11  12   GLC  83  88   BUN  18  18   CR  2.00*  1.77*   BEE  8.4*  7.9*     CBC RESULTS:   Recent Labs   Lab Test  03/18/18   0624  03/18/18   0320   03/17/18   0542   WBC  12.6*   --    --   8.8   RBC  2.98*   --    --   3.01*   HGB  7.8*  7.3*   < >  8.0*   HCT  25.7*   --    --   25.7*   PLT  176   --    --   157    < > = values in this interval not displayed.       Results for orders placed or performed during the hospital encounter of 03/16/18 (from the past 24 hour(s))   Enteric Bacteria and Virus Panel by NIKKO Stool   Result Value Ref Range    Campylobacter group by NIKKO Not Detected NDET^Not Detected    Salmonella species by NIKKO Not Detected NDET^Not Detected    Shigella species by NIKKO Not Detected NDET^Not Detected    Vibrio group by NIKKO Not Detected NDET^Not Detected    Rotavirus A by NIKKO Not Detected NDET^Not Detected    Shiga toxin 1 gene by NIKKO Not Detected NDET^Not Detected    Shiga toxin 2 gene by NIKKO Not Detected NDET^Not Detected    Norovirus I and II by NIKKO Not Detected NDET^Not Detected    Yersinia enterocolitica by NIKKO Not Detected NDET^Not Detected    Enteric pathogen comment       Testing performed by multiplexed, qualitative PCR using the [employer] Enteric   Pathogens Nucleic Acid Test. Results should not be used as the sole basis for diagnosis,   treatment, or other patient management decisions.     Hemoglobin   Result Value Ref Range    Hemoglobin 7.5 (L) 13.3 - 17.7 g/dL   Hemoglobin   Result Value Ref Range    Hemoglobin 6.9 (LL) 13.3 - 17.7 g/dL   Hemoglobin   Result Value Ref Range    Hemoglobin 7.3 (L) 13.3 - 17.7 g/dL   Lipase   Result Value Ref Range    Lipase 202 73 - 393 U/L   Comprehensive metabolic panel   Result Value  Ref Range    Sodium 140 133 - 144 mmol/L    Potassium 3.6 3.4 - 5.3 mmol/L    Chloride 111 (H) 94 - 109 mmol/L    Carbon Dioxide 18 (L) 20 - 32 mmol/L    Anion Gap 11 3 - 14 mmol/L    Glucose 83 70 - 99 mg/dL    Urea Nitrogen 18 7 - 30 mg/dL    Creatinine 2.00 (H) 0.66 - 1.25 mg/dL    GFR Estimate 34 (L) >60 mL/min/1.7m2    GFR Estimate If Black 42 (L) >60 mL/min/1.7m2    Calcium 8.4 (L) 8.5 - 10.1 mg/dL    Bilirubin Total 2.0 (H) 0.2 - 1.3 mg/dL    Albumin 2.7 (L) 3.4 - 5.0 g/dL    Protein Total 6.6 (L) 6.8 - 8.8 g/dL    Alkaline Phosphatase 215 (H) 40 - 150 U/L    ALT 22 0 - 70 U/L    AST 29 0 - 45 U/L   Nt probnp inpatient   Result Value Ref Range    N-Terminal Pro BNP Inpatient 1103 (H) 0 - 900 pg/mL   Partial thromboplastin time   Result Value Ref Range    PTT 40 (H) 22 - 37 sec   INR   Result Value Ref Range    INR 1.60 (H) 0.86 - 1.14   CBC with platelets   Result Value Ref Range    WBC 12.6 (H) 4.0 - 11.0 10e9/L    RBC Count 2.98 (L) 4.4 - 5.9 10e12/L    Hemoglobin 7.8 (L) 13.3 - 17.7 g/dL    Hematocrit 25.7 (L) 40.0 - 53.0 %    MCV 86 78 - 100 fl    MCH 26.2 (L) 26.5 - 33.0 pg    MCHC 30.4 (L) 31.5 - 36.5 g/dL    RDW 18.7 (H) 10.0 - 15.0 %    Platelet Count 176 150 - 450 10e9/L   Glucose by meter   Result Value Ref Range    Glucose 92 70 - 99 mg/dL       Assessment and Plan:    Abhijeet Mccauley is a 57 year old male with a past medical history significant for PUD and GI bleeding, chronic kidney disease, AML s/p chemo now in remission, alcohol dependence, alcoholic liver cirrhosis with coagulation defect, ulcerative colitis, copd, tobacco dependence who presents on 3/16/2018 with bright red blood per rectum likely due to a lower GI bleed.          GI bleed, likely lower source / bright red blood per rectum  Occurs in setting of known PUD, history of previous GI bleed, alcohol dependence, and history of varices s/p banding. Was recently hospitalized at [facility] in Jan 2018 for melena, required 3U PRBC,  underwent EGD showing grade 2 varices which were banded. EGD here since showed no obvious varices. Presented on 3/16/18 with hemoglobin 7.4 after 2 episodes of bright red blood per rectum. Hemodynamically stable upon admission.  On q 6 hour hgb, protonix bid iv  Plans for colonoscopy and EGD by general surgery today  hgb 7.8, no recent bleeding     Acute fever and vomiting overnight--transfusion reaction ruled out by pathology, probable uti  On rocephin, wbc is down        Uncomplicated alcohol dependence (H)  History of withdrawal and seizures in the past. Presented with alcohol level 0.04. No evidence of withdrawal on exam, although potentially at risk for withdrawing.  - CIWA protocol  - Prn Ativan      Alcoholic cirrhosis of liver (H) / Coagulation defect (H)  Previously diagnosed. LFTs normal. INR 1.34, chronically elevated (not on pharmacologic anticoagulation). Is prescribed lactulose but has not been taking - defer restart to day team.      Leukocytosis  Down from previous       Essential hypertension  Previously diagnosed, pressures stable during emergency department course. Taking propranolol and lasix prior to admission - hold lasix, continue propranolol with holding parameters.        Acute myeloid leukemia in remission (H)  Diagnosed in 2008, s/p chemo, did not require bone marrow transplant. Officially in remission since 2012. Treated at [facility]. As above, OK to received irradiated blood products per pathology after review.      Chronic kidney disease  Previously diagnosed. Presented with creatinine 1.80, baseline between 1.9-2.1.   Creat 1.77 03/17/18   Creat 2.0 on 03/18/18         Universal ulcerative (chronic) colitis(556.6) (H)  Per chart review. Patient not taking any active medications for this currently.        Mild intermittent asthma without complication  Stable respiratory status. Taking Dulera, Spiriva, and albuterol prior to admission - med rec pending, Dulera & Spiriva ordered. Albuterol  nebs ordered.      Tobacco dependence syndrome  Encourage cessation. Nicotine replacement available.              Fluids: lock  Electrolytes: Monitor  Nutrition: Clear liquid      DVT Prophylaxis: Pneumatic Compression Devices, no pharmacologic anticoagulation due to GI bleed  Code Status: Full Code - discussed directly with patient      Lines: Peripheral  Chong catheter: Not indicated      Disposition  Plan is for scopes today  Probably dc pending scopes  No uc back -will empirically rx with bactrim

## 2018-03-18 NOTE — DISCHARGE SUMMARY
Admit Date:     03/16/2018   Discharge Date:           HISTORY OF PRESENT ILLNESS:  Chris Mccauley is a 57-year-old male with history of alcoholism, cirrhosis, previous acute myeloid leukemia in remission, chronic kidney disease with baseline creatinine around 2, previous ulcerative colitis, asthma and tobacco use.  The patient presented with 2 episodes of bright red blood per rectum.  He recently was hospitalized at [facility] for melena.  He needed 3 units of blood.  He had an EGD or something that was clipped.  I believe it was a varix.  He had a repeat EGD with Dr. Case here and 02/08/2018 without evidence of active bleeding.  There were no varices noted upon admission.  His hemoglobin was 7.4.  His hemoglobin dropped to 6.6 and he was given 1 unit of packed cells.  Two hours after he received the packed cells he had an acute fever and emesis.  Transfusion reaction workup was done and ultimately, the pathologist felt that there was no hemolytic transfusion reaction.  I did work him up further and they found evidence of a UTI.  At the time of this report his urine culture is still pending.  He was given Rocephin.  I am going to empirically discharge him on Bactrim.  He defervesced and had no more fever.      In terms of the GI bleeding, the bleeding stopped.  He was prepped for colonoscopy.  Dr. Case has given me a verbal report about that.  He really found no major pathology and concluded that perhaps the bleeding was hemorrhoidal.  He seemed to be historically fairly certain that it was lower GI bleeding because when he did have stool, he had tan stool with some bright red blood around it.      The patient's creatinine remained stable during this hospitalization with a discharge creatinine of 2.0.      ASSESSMENT:   1.  GI bleed, likely lower GI.  No obvious source found on colonoscopy, perhaps hemorrhoidal.   2.  Recent upper GI bleeding.   3.  Alcoholic cirrhosis with previous history of varices (not  found on most recent EGD).   4.  History of alcoholism.   5.  Urinary tract infection.   6.  Acute myeloid leukemia (AML) in remission.   7.  Chronic kidney disease (CKD) stage 4.   8.  History of ulcerative colitis.   9.  History of asthma.   10.  Tobacco use.      PLAN:  The patient was discharged on Bactrim-DS 1 b.i.d. for 7 days.  He should see his primary doctor next week.  He is going to return to the ER if there are any problems.  He apparently continues to drink and is not interested in alcohol cessation.     Current Discharge Medication List      START taking these medications    Details   sulfamethoxazole-trimethoprim (BACTRIM DS/SEPTRA DS) 800-160 MG per tablet Take 1 tablet by mouth 2 times daily  Qty: 14 tablet, Refills: 0    Associated Diagnoses: Acute cystitis without hematuria         CONTINUE these medications which have NOT CHANGED    Details   PANTOPRAZOLE SODIUM PO Take 40 mg by mouth every morning (before breakfast)      propranolol (INDERAL) 20 MG tablet Take 1 tablet (20 mg) by mouth 2 times daily  Qty: 90 tablet, Refills: 3    Associated Diagnoses: Gastroesophageal reflux disease without esophagitis      mometasone-formoterol (DULERA) 200-5 MCG/ACT oral inhaler Inhale 2 puffs into the lungs 2 times daily       tiotropium (SPIRIVA) 18 MCG capsule Inhale 1 capsule into the lungs daily       furosemide (LASIX) 20 MG tablet Take 20 mg by mouth every morning       potassium chloride SA (K-DUR/KLOR-CON M) 10 MEQ CR tablet Take 1 tablet (10 mEq) by mouth daily    Associated Diagnoses: Gastroesophageal reflux disease without esophagitis      Melatonin ER 5 MG TBCR Take 1 tablet by mouth At Bedtime      SODIUM BICARBONATE PO Take 650 mg by mouth daily as needed       TRAZODONE HCL PO Take 50 mg by mouth At Bedtime       loratadine (CLARITIN) 10 MG tablet Take 1 tablet by mouth daily as needed for allergies.  Qty: 30 tablet, Refills: 6    Associated Diagnoses: Allergic rhinitis      acetaminophen  (TYLENOL) 500 MG tablet Take 1-2 tablets by mouth every 6 hours as needed.      lactulose (CHRONULAC) 10 GM/15ML solution Take 6.7 g by mouth 2 times daily       albuterol (PROAIR HFA/PROVENTIL HFA/VENTOLIN HFA) 108 (90 BASE) MCG/ACT Inhaler Inhale 2 puffs into the lungs every 4 hours as needed  Qty: 1 Inhaler, Refills: 11    Associated Diagnoses: Mild intermittent asthma without complication      desonide (DESOWEN) 0.05 % lotion Apply topically as needed  Qty: 118 mL, Refills: 1    Associated Diagnoses: Rosacea           Unresulted Labs Ordered in the Past 30 Days of this Admission     Date and Time Order Name Status Description    3/17/2018 0620 Urine Culture Aerobic Bacterial Preliminary     3/17/2018 0542 Transfusion reaction evaluation In process     3/17/2018 0507 Blood culture Preliminary     3/17/2018 0507 Blood culture Preliminary                  MAUREEN AZUL MD             D: 2018   T: 2018   MT: CC      Name:     ALVA MARISCAL   MRN:      [id number]        Account:        [id number]   :      1960           Admit Date:     2018                                  Discharge Date:       Document: [id number]       cc: Parrish Fnuk MD

## 2018-03-18 NOTE — ANESTHESIA PREPROCEDURE EVALUATION
Anesthesia Evaluation     . Pt has had prior anesthetic. Type: Regional and MAC    No history of anesthetic complications          ROS/MED HX    ENT/Pulmonary:     (+)tobacco use, Current use asthma Treatment: Inhaled steroids, Inhaler prn and Inhaler daily,  moderate COPD, , . .    Neurologic:       Cardiovascular:     (+) hypertension----. : . . . :. . Previous cardiac testing Echodate:11-2017results:Interpretation Summary   Left ventricular systolic function is normal.  The visual ejection fraction is estimated at 55-60%.  No regional wall motion abnormalities noted.  There are minor valve changes, that are not significant.     This is an essentially normal echo. .Parrish Diallo: results:ECG reviewed date:8-2009 results:Sinus rhythm  Normal ECG  When compared with ECG of 20-DEC-2008 08:28,  No significant change was found date: results:          METS/Exercise Tolerance:     Hematologic:     (+) History of blood clots pt is not anticoagulated, Anemia, History of Transfusion no previous transfusion reaction Other Hematologic Disorder-thrombocytopenia      Musculoskeletal:         GI/Hepatic:     (+) hepatitis type Alcoholic, liver disease, Other GI/Hepatic alcoholic cirrosis, acute GI bleed, ulcerative colitis      Renal/Genitourinary:     (+) chronic renal disease, type: CRI, Pt does not require dialysis, Pt has no history of transplant,       Endo:     (+) Obesity, .      Psychiatric:         Infectious Disease:         Malignancy:   (+) Malignancy History of Other  Other CA AML Remission status post Chemo         Other:                     Physical Exam  Normal systems: cardiovascular, pulmonary and dental    Airway   Mallampati: II  TM distance: >3 FB  Neck ROM: full    Dental     Cardiovascular       Pulmonary                     Anesthesia Plan      History & Physical Review  History and physical reviewed and following examination; no interval change.    ASA Status:  3 emergent.    NPO Status:  > 8  hours    Plan for MAC Maintenance will be Balanced.  Reason for MAC:  Deep or markedly invasive procedure (G8)         Postoperative Care      Consents  Anesthetic plan, risks, benefits and alternatives discussed with:  Patient..                          .

## 2018-03-18 NOTE — PROGRESS NOTES
[facility] TRANSPORT NOTE  Data:   Reason for Transport:  Upper & Lower GI Endoscopy    Abhijeet Mccauley was transported to surgery via wheel chair at 9:25 AM.  Patient was accompanied by Nursing Assistant. Equipment used for transport: None. Family was aware of reason for transport: N/A    Response:  Patient's condition when transferred off unit was stable.    Nohemy Feng

## 2018-03-18 NOTE — PLAN OF CARE
Problem: Gastrointestinal Condition Comorbidity  Goal: Gastrointestinal Condition  Patient comorbidity will be monitored for signs and symptoms of Gastrointestinal condition.  Problems will be absent, minimized or managed by discharge/transition of care.   Outcome: Completed Date Met: 03/18/18  S/p Upper and Lower GI Endoscopy today, no findings except for 4 polyps, two of which were sent off for biopsy.  Blood in stools no longer present.

## 2018-03-18 NOTE — ANESTHESIA POSTPROCEDURE EVALUATION
Patient: Abhijeet Mccauley    Procedure(s):   - Wound Class: II-Clean Contaminated   - Wound Class: II-Clean Contaminated   - Wound Class: II-Clean Contaminated    Diagnosis:.  Diagnosis Additional Information: No value filed.    Anesthesia Type:  MAC    Note:  Anesthesia Post Evaluation    Patient location during evaluation: Bedside  Patient participation: Able to fully participate in evaluation  Level of consciousness: awake and alert  Pain management: adequate  Airway patency: patent  Cardiovascular status: acceptable  Respiratory status: acceptable  Hydration status: acceptable  PONV: none     Anesthetic complications: None          Last vitals:  Vitals:    03/18/18 0306 03/18/18 0438 03/18/18 0721   BP: 131/54 113/49 99/40   Pulse: 77 72 70   Resp: 18 18 18   Temp: 37  C (98.6  F) 37.2  C (98.9  F) 37  C (98.6  F)   SpO2: 96% 97% 98%         Electronically Signed By: Edinson Mays CRNA, APRN CRNA  March 18, 2018  9:57 AM

## 2018-03-18 NOTE — PLAN OF CARE
Problem: Renal Insufficiency Comorbidity  Goal: Renal Insufficiency  Patient comorbidity will be monitored for signs and symptoms of Renal Insufficiency (Chronic) condition.  Problems will be absent, minimized or managed by discharge/transition of care.   Outcome: Completed Date Met: 03/18/18  Renal function has remained stable, patient found to have a UTI while here for which he has been receiving IV antibiotics.

## 2018-03-18 NOTE — DISCHARGE INSTRUCTIONS
Follow up with your primary doctor this coming week.  Take bactrim twice a day for urinary track infection.  Return if problems.

## 2018-03-19 ENCOUNTER — TELEPHONE (OUTPATIENT)
Dept: FAMILY MEDICINE | Facility: CLINIC | Age: 58
End: 2018-03-19

## 2018-03-19 LAB
BLD PROD TYP BPU: NORMAL
BLD UNIT ID BPU: 0
BLOOD PRODUCT CODE: NORMAL
BPU ID: NORMAL
TRANSFUSION STATUS PATIENT QL: NORMAL
TRANSFUSION STATUS PATIENT QL: NORMAL

## 2018-03-19 NOTE — TELEPHONE ENCOUNTER
"  ED / Discharge Outreach Protocol    Patient Contact    Attempt # 1    Was call answered?  Yes.  \"May I please speak with <patient name>\"  Is patient available?   Yes    Hospital/TCU/ED for chronic condition Discharge Protocol    \"Hi, my name is Zulema Gramajo, a registered nurse, and I am calling from [facility].  I am calling to follow up and see how things are going for you after your recent emergency visit/hospital/TCU stay.\"    Tell me how you are doing now that you are home?\" Pt says that he is dong \"great!\"  Then, he clarifies that all his symptoms are not gone, but they are much better.      Discharge Instructions    \"Let's review your discharge instructions.  What is/are the follow-up recommendations?  Pt. Response:    PLAN:  The patient was discharged on Bactrim-DS 1 b.i.d. for 7 days.  He should see his primary doctor next week.    He is going to return to the ER if there are any problems.    He apparently continues to drink and is not interested in alcohol cessation.     \"Has an appointment with your primary care provider been scheduled?\"   Yes. (confirm)  3/21/18    \"When you see the provider, I would recommend that you bring your medications with you.\"    Medications    \"Tell me what changed about your medicines when you discharged?\"    Changes to chronic meds?    0-1    \"What questions do you have about your medications?\"    None     New diagnoses of heart failure, COPD, diabetes, or MI?    No              Medication reconciliation completed? Yes  Was MTM referral placed (*Make sure to put transitions as reason for referral)?   No    Call Summary    \"What questions or concerns do you have about your recent visit and your follow-up care?\"     none    \"If you have questions or things don't continue to improve, we encourage you contact us through the main clinic number (give number).  Even if the clinic is not open, triage nurses are available 24/7 to help you.     We would like you to know that " "our clinic has extended hours (provide information).  We also have urgent care (provide details on closest location and hours/contact info)\"      \"Thank you for your time and take care!\"               "

## 2018-03-20 ENCOUNTER — TELEPHONE (OUTPATIENT)
Dept: AUDIOLOGY | Facility: CLINIC | Age: 58
End: 2018-03-20

## 2018-03-20 NOTE — TELEPHONE ENCOUNTER
Patient reports difficulty with his hearing aids. Set up appointment to be seen on 3/22/18.    Ai KUNZ, #[id number]

## 2018-03-20 NOTE — TELEPHONE ENCOUNTER
Reason for Call:  Other call back    Detailed comments: pt calling stating his hearing is more dull compared to what it used to be when he takes his hearing aids out. Would like to know if he should come in for another hearing test.   Also his hearing aids wires are coiling up     Phone Number Patient can be reached at: Home number on file [phone] (home)    Best Time: any     Can we leave a detailed message on this number? YES    Call taken on 3/20/2018 at 10:05 AM by Elena Jin

## 2018-03-21 LAB
BACTERIA SPEC CULT: ABNORMAL
BACTERIA SPEC CULT: ABNORMAL
Lab: ABNORMAL
SPECIMEN SOURCE: ABNORMAL

## 2018-03-22 ENCOUNTER — TELEPHONE (OUTPATIENT)
Dept: FAMILY MEDICINE | Facility: CLINIC | Age: 58
End: 2018-03-22

## 2018-03-22 ENCOUNTER — OFFICE VISIT (OUTPATIENT)
Dept: AUDIOLOGY | Facility: CLINIC | Age: 58
End: 2018-03-22
Payer: COMMERCIAL

## 2018-03-22 DIAGNOSIS — H90.3 SENSORINEURAL HEARING LOSS, ASYMMETRICAL: Primary | ICD-10-CM

## 2018-03-22 LAB — COPATH REPORT: NORMAL

## 2018-03-22 PROCEDURE — 92593 HC HEARING AID CHECK, BINAURAL: CPT | Performed by: AUDIOLOGIST

## 2018-03-22 NOTE — PROGRESS NOTES
57 year old male comes in for a hearing aid recheck. He is currently wearing 2014 [employer] [id number] hearing aids that were fit at another facility. Patient reports he is not hearing as well.     Verified hearing aid functionality.Replaced right hearing aid retention tail.     An otoscopic examination was done and revealed clear external auditory canals bilaterally.     Recommend hearing evaluation to rule out changes in his hearing.    See chart in the hearing aid room.     Ai KUNZ, #[id number]

## 2018-03-22 NOTE — MR AVS SNAPSHOT
After Visit Summary   3/22/2018    Abhijeet Mccauley    MRN: [id number]           Patient Information     Date Of Birth          1960        Visit Information        Provider Department      3/22/2018 10:30 AM Ai Min, [facility]        Today's Diagnoses     Sensorineural hearing loss, asymmetrical    -  1       Follow-ups after your visit        Your next 10 appointments already scheduled     Mar 28, 2018  2:00 PM CDT   [facility]    [address] [address]   [phone]           Bring a current list of meds and any records pertaining to this visit. For Physicals, please bring immunization records and any forms needing to be filled out. Please arrive 10 minutes early to complete paperwork.            Apr 09, 2018 11:00 AM CDT   [facility] Visit with Sheila Romano   [facility]    [address] [address]   [phone]              Who to contact     If you have questions or need follow up information about today's clinic visit or your schedule please contact [facility] directly at [phone].  Normal or non-critical lab and imaging results will be communicated to you by indidebthart, letter or phone within 4 business days after the clinic has received the results. If you do not hear from us within 7 days, please contact the clinic through indidebthart or phone. If you have a critical or abnormal lab result, we will notify you by phone as soon as possible.  Submit refill requests through Tunii or call your pharmacy and they will forward the refill request to us. Please allow 3 business days for your refill to be completed.          Additional Information About Your Visit        Tunii Information     Tunii lets you send messages to your doctor, view your test results, renew your  "prescriptions, schedule appointments and more. To sign up, go to [email or web address] . Click on \"Log in\" on the left side of the screen, which will take you to the Welcome page. Then click on \"Sign up Now\" on the right side of the page.     You will be asked to enter the access code listed below, as well as some personal information. Please follow the directions to create your username and password.     Your access code is: [email or web address]  Expires: 2018  4:54 PM     Your access code will  in 90 days. If you need help or a new code, please call your [place] clinic or [phone].        Care EveryWhere ID     This is your Care EveryWhere ID. This could be used by other organizations to access your [place] medical records  [id number]         Blood Pressure from Last 3 Encounters:   18 133/40   18 130/60   18 124/66    Weight from Last 3 Encounters:   18 184 lb 15.5 oz (83.9 kg)   18 188 lb (85.3 kg)   18 190 lb (86.2 kg)              We Performed the Following     HEARING AID CHECK, BINAURAL        Primary Care Provider Office Phone # Fax #    Parrish Funk [phone] [address] [address]        Equal Access to Services     JORGE COOL AH: Hadii aad ku hadasho Sonunoali, waaxda luqadaha, qaybta kaalmada adeegyada, song chowdhury . So [facility] [phone].    ATENCIÓN: Si habla español, tiene a uriostegui disposición servicios gratuitos de asistencia lingüística. Claudy waldron [phone].    We comply with applicable federal civil rights laws and [place] laws. We do not discriminate on the basis of race, color, national origin, age, disability, sex, sexual orientation, or gender identity.            Thank you!     Thank you for choosing [facility]  for your care. Our goal is always to provide you with excellent care. Hearing back from our patients is one way we can continue to improve our " services. Please take a few minutes to complete the written survey that you may receive in the mail after your visit with us. Thank you!             Your Updated Medication List - Protect others around you: Learn how to safely use, store and throw away your medicines at www.disposemymeds.org.          This list is accurate as of 3/22/18 10:57 AM.  Always use your most recent med list.                   Brand Name Dispense Instructions for use Diagnosis    acetaminophen 500 MG tablet    TYLENOL     Take 1-2 tablets by mouth every 6 hours as needed.        albuterol 108 (90 BASE) MCG/ACT Inhaler    PROAIR HFA/PROVENTIL HFA/VENTOLIN HFA    1 Inhaler    Inhale 2 puffs into the lungs every 4 hours as needed    Mild intermittent asthma without complication       desonide 0.05 % lotion    DESOWEN    118 mL    Apply topically as needed    Rosacea       furosemide 20 MG tablet    LASIX     Take 20 mg by mouth every morning        lactulose 10 GM/15ML solution    CHRONULAC     Take 6.7 g by mouth 2 times daily        loratadine 10 MG tablet    CLARITIN    30 tablet    Take 1 tablet by mouth daily as needed for allergies.    Allergic rhinitis       Melatonin ER 5 MG Tbcr      Take 1 tablet by mouth At Bedtime        mometasone-formoterol 200-5 MCG/ACT oral inhaler    DULERA     Inhale 2 puffs into the lungs 2 times daily        PANTOPRAZOLE SODIUM PO      Take 40 mg by mouth every morning (before breakfast)        potassium chloride SA 10 MEQ CR tablet    K-DUR/KLOR-CON M     Take 1 tablet (10 mEq) by mouth daily    Gastroesophageal reflux disease without esophagitis       propranolol 20 MG tablet    INDERAL    90 tablet    Take 1 tablet (20 mg) by mouth 2 times daily    Gastroesophageal reflux disease without esophagitis       SODIUM BICARBONATE PO      Take 650 mg by mouth daily as needed        sulfamethoxazole-trimethoprim 800-160 MG per tablet    BACTRIM DS/SEPTRA DS    14 tablet    Take 1 tablet by mouth 2 times daily     Acute cystitis without hematuria       tiotropium 18 MCG capsule    SPIRIVA     Inhale 1 capsule into the lungs daily        TRAZODONE HCL PO      Take 50 mg by mouth At Bedtime

## 2018-03-23 PROBLEM — D12.6 TUBULAR ADENOMA OF COLON: Status: ACTIVE | Noted: 2018-03-23

## 2018-03-23 LAB
BACTERIA SPEC CULT: NO GROWTH
BACTERIA SPEC CULT: NO GROWTH
Lab: NORMAL
Lab: NORMAL
SPECIMEN SOURCE: NORMAL
SPECIMEN SOURCE: NORMAL
TRANSF REACT PLASRBC-IMP: NORMAL

## 2018-03-23 NOTE — TELEPHONE ENCOUNTER
[facility] summary form started and routed to RN to review.  I only found Lipid panel from 2009 want to double check that I have not missed it.

## 2018-03-28 ENCOUNTER — OFFICE VISIT (OUTPATIENT)
Dept: FAMILY MEDICINE | Facility: CLINIC | Age: 58
End: 2018-03-28
Payer: COMMERCIAL

## 2018-03-28 VITALS
DIASTOLIC BLOOD PRESSURE: 61 MMHG | OXYGEN SATURATION: 97 % | HEART RATE: 72 BPM | WEIGHT: 183 LBS | HEIGHT: 66 IN | TEMPERATURE: 98.8 F | SYSTOLIC BLOOD PRESSURE: 108 MMHG | BODY MASS INDEX: 29.41 KG/M2

## 2018-03-28 DIAGNOSIS — R74.8 ELEVATED LIVER ENZYMES: ICD-10-CM

## 2018-03-28 DIAGNOSIS — L29.9 PRURITIC DISORDER: ICD-10-CM

## 2018-03-28 DIAGNOSIS — D50.9 IRON DEFICIENCY ANEMIA, UNSPECIFIED IRON DEFICIENCY ANEMIA TYPE: Primary | ICD-10-CM

## 2018-03-28 DIAGNOSIS — K29.61 GASTROINTESTINAL HEMORRHAGE ASSOCIATED WITH OTHER GASTRITIS: ICD-10-CM

## 2018-03-28 LAB
CREAT SERPL-MCNC: 2.02 MG/DL (ref 0.66–1.25)
GFR SERPL CREATININE-BSD FRML MDRD: 34 ML/MIN/1.7M2
HGB BLD-MCNC: 8.5 G/DL (ref 13.3–17.7)

## 2018-03-28 PROCEDURE — 82565 ASSAY OF CREATININE: CPT | Performed by: FAMILY MEDICINE

## 2018-03-28 PROCEDURE — 36415 COLL VENOUS BLD VENIPUNCTURE: CPT | Performed by: FAMILY MEDICINE

## 2018-03-28 PROCEDURE — 99214 OFFICE O/P EST MOD 30 MIN: CPT | Performed by: FAMILY MEDICINE

## 2018-03-28 PROCEDURE — 85018 HEMOGLOBIN: CPT | Performed by: FAMILY MEDICINE

## 2018-03-28 NOTE — MR AVS SNAPSHOT
After Visit Summary   3/28/2018    Abhijeet Mccauley    MRN: [id number]           Patient Information     Date Of Birth          1960        Visit Information        Provider Department      3/28/2018 2:00 PM Parrish Funk MD [facility]        Today's Diagnoses     Iron deficiency anemia, unspecified iron deficiency anemia type    -  1    Elevated liver enzymes        Pruritic disorder        Gastrointestinal hemorrhage associated with other gastritis          Care Instructions          Thank you for choosing [facility].  You may be receiving a survey in the mail from Jadon Carter regarding your visit today.  Please take a few minutes to complete and return the survey to let us know how we are doing.      If you have questions or concerns, please contact us via [employer] or you can contact your care team at [phone].    Our Clinic hours are:  Monday 6:40 am  to 7:00 pm  Tuesday -Friday 6:40 am to 5:00 pm    The [place] outpatient lab hours are:  Monday - Friday 6:10 am to 4:45 pm  Saturdays 7:00 am to 11:00 am  Appointments are required, call [phone]    If you have clinical questions after hours or would like to schedule an appointment,  call the clinic at [phone].    (N90.9) Iron deficiency anemia, unspecified iron deficiency anemia type  (primary encounter diagnosis)  Comment:   Plan: Asthma Action Plan (AAP), Hemoglobin,         Hemoglobin        We reviewed and discussed the labs and there are several issues. The main one is the low Hgb from the bleeding. You were 7.8 and normal is 13.3 or higher.   Start iron pills one daily of Ferrous sulfate at 325 mg per dose. Take with Lemonade or Orange juice but avoid calcium as in dairy products, spinach and certain antacids.   Do the Hgb today and I will order a repeat in 3-4 weeks. It will be done monthly til normal. Once the Hgb is 13.3 or higher, then stop the iron.     (R74.8) Elevated liver enzymes  Comment:  "  Plan: Creatinine, Hepatic panel        We discussed repeating these and he will do the labs. We will call the results. Stopping alcohol is essential.   Take no Tylenol for now.     (L29.9) Pruritic disorder  Comment:   Plan: Claritin or Benadryl can be taken.     (K29.61) Gastrointestinal hemorrhage associated with other gastritis  Comment:   Plan: do the Hgb today. Iron daily in the form of Ferrous sulfate, 325 mg daily. See above.             Follow-ups after your visit        Your next 10 appointments already scheduled     Apr 09, 2018 11:00 AM CDT   [facility] Sheila Romano   [facility]    [address] [address] [phone]              Future tests that were ordered for you today     Open Future Orders        Priority Expected Expires Ordered    Hepatic panel Routine  4/28/2018 3/28/2018    Hemoglobin Routine  4/28/2018 3/28/2018            Who to contact     If you have questions or need follow up information about today's clinic visit or your schedule please contact [facility] directly at [phone].  Normal or non-critical lab and imaging results will be communicated to you by MyChart, letter or phone within 4 business days after the clinic has received the results. If you do not hear from us within 7 days, please contact the clinic through [employer] [facility] or phone. If you have a critical or abnormal lab result, we will notify you by phone as soon as possible.  Submit refill requests through [employer] or call your pharmacy and they will forward the refill request to us. Please allow 3 business days for your refill to be completed.          Additional Information About Your Visit        [facility] [facility] Information     [employer] lets you send messages to your doctor, view your test results, renew your prescriptions, schedule appointments and more. To sign up, go to [email or web address] . Click on \"Log in\" on the left side of the " "screen, which will take you to the Welcome page. Then click on \"Sign up Now\" on the right side of the page.     You will be asked to enter the access code listed below, as well as some personal information. Please follow the directions to create your username and password.     Your access code is: [email or web address]  Expires: 2018  4:54 PM     Your access code will  in 90 days. If you need help or a new code, please call your [facility] clinic or [phone].        Care EveryWhere ID     This is your Care EveryWhere ID. This could be used by other organizations to access your [facility] medical records  [id number]        Your Vitals Were     Pulse Temperature Height Pulse Oximetry BMI (Body Mass Index)       72 98.8  F (37.1  C) (Tympanic) 5' 6\" (1.676 m) 97% 29.54 kg/m2        Blood Pressure from Last 3 Encounters:   18 108/61   18 133/40   18 130/60    Weight from Last 3 Encounters:   18 183 lb (83 kg)   18 184 lb 15.5 oz (83.9 kg)   18 188 lb (85.3 kg)              We Performed the Following     Asthma Action Plan (AAP)     Creatinine     Hemoglobin        Primary Care Provider Office Phone # Fax #    Parrish Funk [phone] [address] [facility] [address]        Equal Access to Services     JORGE COOL AH: Hadii sameera pickeringo Soemily, waaxda luqadaha, qaybta kaalmada irlanda, song ureña. So [facility] [phone].    ATENCIÓN: Si habla español, tiene a uriostegui disposición servicios gratuitos de asistencia lingüística. Claudy al [phone].    We comply with applicable federal civil rights laws and [place] laws. We do not discriminate on the basis of race, color, national origin, age, disability, sex, sexual orientation, or gender identity.            Thank you!     Thank you for choosing [facility]  for your care. Our goal is always to provide you with excellent care. Hearing back from our " patients is one way we can continue to improve our services. Please take a few minutes to complete the written survey that you may receive in the mail after your visit with us. Thank you!             Your Updated Medication List - Protect others around you: Learn how to safely use, store and throw away your medicines at www.disposemymeds.org.          This list is accurate as of 3/28/18  2:40 PM.  Always use your most recent med list.                   Brand Name Dispense Instructions for use Diagnosis    acetaminophen 500 MG tablet    TYLENOL     Take 1-2 tablets by mouth every 6 hours as needed.        albuterol 108 (90 BASE) MCG/ACT Inhaler    PROAIR HFA/PROVENTIL HFA/VENTOLIN HFA    1 Inhaler    Inhale 2 puffs into the lungs every 4 hours as needed    Mild intermittent asthma without complication       desonide 0.05 % lotion    DESOWEN    118 mL    Apply topically as needed    Rosacea       furosemide 20 MG tablet    LASIX     Take 20 mg by mouth every morning        lactulose 10 GM/15ML solution    CHRONULAC     Take 6.7 g by mouth 2 times daily        loratadine 10 MG tablet    CLARITIN    30 tablet    Take 1 tablet by mouth daily as needed for allergies.    Allergic rhinitis       Melatonin ER 5 MG Tbcr      Take 1 tablet by mouth At Bedtime        mometasone-formoterol 200-5 MCG/ACT oral inhaler    DULERA     Inhale 2 puffs into the lungs 2 times daily        PANTOPRAZOLE SODIUM PO      Take 40 mg by mouth every morning (before breakfast)        potassium chloride SA 10 MEQ CR tablet    K-DUR/KLOR-CON M     Take 1 tablet (10 mEq) by mouth daily    Gastroesophageal reflux disease without esophagitis       propranolol 20 MG tablet    INDERAL    90 tablet    Take 1 tablet (20 mg) by mouth 2 times daily    Gastroesophageal reflux disease without esophagitis       SODIUM BICARBONATE PO      Take 650 mg by mouth daily as needed        sulfamethoxazole-trimethoprim 800-160 MG per tablet    BACTRIM DS/SEPTRA DS     14 tablet    Take 1 tablet by mouth 2 times daily    Acute cystitis without hematuria       tiotropium 18 MCG capsule    SPIRIVA     Inhale 1 capsule into the lungs daily        TRAZODONE HCL PO      Take 50 mg by mouth At Bedtime

## 2018-03-28 NOTE — LETTER
My Asthma Action Plan  Name: Abhijeet Mccauley   YOB: 1960  Date: 3/28/2018   My doctor: Parrish Funk MD   My clinic: [facility]        My Control Medicine: Mometasone + formoterol (Dulera) -  200/5 mcg 2 puffs twice daily.  Tiotropium (Spiriva) -  Handihaler 18 mcg daily  My Rescue Medicine: Albuterol (Proair/Ventolin/Proventil) inhaler As needed.   My Asthma Severity: intermittent  Avoid your asthma triggers: A list of triggers is enclosed with your letter.               GREEN ZONE   Good Control    I feel good    No cough or wheeze    Can work, sleep and play without asthma symptoms       Take your asthma control medicine every day.     1. If exercise triggers your asthma, take your rescue medication    15 minutes before exercise or sports, and    During exercise if you have asthma symptoms  2. Spacer to use with inhaler: If you have a spacer, make sure to use it with your inhaler             YELLOW ZONE Getting Worse  I have ANY of these:    I do not feel good    Cough or wheeze    Chest feels tight    Wake up at night   1. Keep taking your Green Zone medications  2. Start taking your rescue medicine:    every 20 minutes for up to 1 hour. Then every 4 hours for 24-48 hours.  3. If you stay in the Yellow Zone for more than 12-24 hours, contact your doctor.  4. If you do not return to the Green Zone in 12-24 hours or you get worse, start taking your oral steroid medicine if prescribed by your provider.           RED ZONE Medical Alert - Get Help  I have ANY of these:    I feel awful    Medicine is not helping    Breathing getting harder    Trouble walking or talking    Nose opens wide to breathe       1. Take your rescue medicine NOW  2. If your provider has prescribed an oral steroid medicine, start taking it NOW  3. Call your doctor NOW  4. If you are still in the Red Zone after 20 minutes and you have not reached your doctor:    Take your rescue medicine again and    Call  911 or go to the emergency room right away    See your regular doctor within 2 weeks of an Emergency Room or Urgent Care visit for follow-up treatment.          Annual Reminders:  Meet with Asthma Educator,  Flu Shot in the Fall, consider Pneumonia Vaccination for patients with asthma (aged 19 and older).    Pharmacy:    [id number] - [place], [place] - [address] OF [address] [place] [address] DR[place] - [place] - [address]  [id number] - [place] - [address]                      Asthma Triggers  How To Control Things That Make Your Asthma Worse    Triggers are things that make your asthma worse.  Look at the list below to help you find your triggers and what you can do about them.  You can help prevent asthma flare-ups by staying away from your triggers.      Trigger                                                          What you can do   Cigarette Smoke  Tobacco smoke can make asthma worse. Do not allow smoking in your home, car or around you.  Be sure no one smokes at a child s day care or school.  If you smoke, ask your health care provider for ways to help you quit.  Ask family members to quit too.  Ask your health care provider for a referral to Quit Plan to help you quit smoking, or call 2-554-669-PLAN.     Colds, Flu, Bronchitis  These are common triggers of asthma. Wash your hands often.  Don t touch your eyes, nose or mouth.  Get a flu shot every year.     Dust Mites  These are tiny bugs that live in cloth or carpet. They are too small to see. Wash sheets and blankets in hot water every week.   Encase pillows and mattress in dust mite proof covers.  Avoid having carpet if you can. If you have carpet, vacuum weekly.   Use a dust mask and HEPA vacuum.   Pollen and Outdoor Mold  Some people are allergic to trees, grass, or weed pollen, or molds. Try to keep your windows closed.  Limit time out doors when pollen count is high.   Ask you health care  provider about taking medicine during allergy season.     Animal Dander  Some people are allergic to skin flakes, urine or saliva from pets with fur or feathers. Keep pets with fur or feathers out of your home.    If you can t keep the pet outdoors, then keep the pet out of your bedroom.  Keep the bedroom door closed.  Keep pets off cloth furniture and away from stuffed toys.     Mice, Rats, and Cockroaches  Some people are allergic to the waste from these pests.   Cover food and garbage.  Clean up spills and food crumbs.  Store grease in the refrigerator.   Keep food out of the bedroom.   Indoor Mold  This can be a trigger if your home has high moisture. Fix leaking faucets, pipes, or other sources of water.   Clean moldy surfaces.  Dehumidify basement if it is damp and smelly.   Smoke, Strong Odors, and Sprays  These can reduce air quality. Stay away from strong odors and sprays, such as perfume, powder, hair spray, paints, smoke incense, paint, cleaning products, candles and new carpet.   Exercise or Sports  Some people with asthma have this trigger. Be active!  Ask your doctor about taking medicine before sports or exercise to prevent symptoms.    Warm up for 5-10 minutes before and after sports or exercise.     Other Triggers of Asthma  Cold air:  Cover your nose and mouth with a scarf.  Sometimes laughing or crying can be a trigger.  Some medicines and food can trigger asthma.

## 2018-03-28 NOTE — LETTER
[facility]  [address] [facility] [address] [address]  [phone]        May 4, 2018    Abhijeet Mccauley  [address] [place] [address]              Dear Abhijeet Mccauley    This is to remind you that your labs are due.    You may call our office at [phone] to schedule an appointment.    Please disregard this notice if you have already had your labs drawn or made an appointment.        Sincerely,        Parrish Funk MD

## 2018-03-28 NOTE — NURSING NOTE
"Chief Complaint   Patient presents with     Hospital F/U     Post hospital follow up.       Initial /61  Pulse 72  Temp 98.8  F (37.1  C) (Tympanic)  Ht 5' 6\" (1.676 m)  Wt 183 lb (83 kg)  SpO2 97%  BMI 29.54 kg/m2 Estimated body mass index is 29.54 kg/(m^2) as calculated from the following:    Height as of this encounter: 5' 6\" (1.676 m).    Weight as of this encounter: 183 lb (83 kg).  Medication Reconciliation: complete  "

## 2018-03-28 NOTE — PATIENT INSTRUCTIONS
Thank you for choosing [facility]  You may be receiving a survey in the mail from Jadon Carter regarding your visit today.  Please take a few minutes to complete and return the survey to let us know how we are doing.      If you have questions or concerns, please contact us via [employer] or you can contact your care team at [phone].    Our Clinic hours are:  Monday 6:40 am  to 7:00 pm  Tuesday -Friday 6:40 am to 5:00 pm    The [place] outpatient lab hours are:  Monday - Friday 6:10 am to 4:45 pm  Saturdays 7:00 am to 11:00 am  Appointments are required, call [phone]    If you have clinical questions after hours or would like to schedule an appointment,  call the clinic at [phone].    (D50.9) Iron deficiency anemia, unspecified iron deficiency anemia type  (primary encounter diagnosis)  Comment:   Plan: Asthma Action Plan (AAP), Hemoglobin,         Hemoglobin        We reviewed and discussed the labs and there are several issues. The main one is the low Hgb from the bleeding. You were 7.8 and normal is 13.3 or higher.   Start iron pills one daily of Ferrous sulfate at 325 mg per dose. Take with Lemonade or Orange juice but avoid calcium as in dairy products, spinach and certain antacids.   Do the Hgb today and I will order a repeat in 3-4 weeks. It will be done monthly til normal. Once the Hgb is 13.3 or higher, then stop the iron.     (R74.8) Elevated liver enzymes  Comment:   Plan: Creatinine, Hepatic panel        We discussed repeating these and he will do the labs. We will call the results. Stopping alcohol is essential.   Take no Tylenol for now.     (L29.9) Pruritic disorder  Comment:   Plan: Claritin or Benadryl can be taken.     (K29.61) Gastrointestinal hemorrhage associated with other gastritis  Comment:   Plan: do the Hgb today. Iron daily in the form of Ferrous sulfate, 325 mg daily. See above.

## 2018-03-28 NOTE — NURSING NOTE
"Chief Complaint   Patient presents with     Hospital F/U     Post hospital follow up.       Initial /61  Pulse 72  Temp 98.8  F (37.1  C) (Tympanic)  Ht 5' 6\" (1.676 m)  Wt 183 lb (83 kg)  BMI 29.54 kg/m2 Estimated body mass index is 29.54 kg/(m^2) as calculated from the following:    Height as of this encounter: 5' 6\" (1.676 m).    Weight as of this encounter: 183 lb (83 kg).  Medication Reconciliation: complete  "

## 2018-03-28 NOTE — PROGRESS NOTES
SUBJECTIVE:   Abhijeet Mccauley is a 57 year old male who presents to clinic today for the following health issues:          Hospital Follow-up Visit:    Hospital/Nursing Home/IP Rehab Facility: [facility]  Date of Admission: 3-16-18  Date of Discharge: 3-18-18  Reason(s) for Admission: GI Bleed    States he is still having some stomach pain.  Feels bloated.            Problems taking medications regularly:  None       Medication changes since discharge: Started on Bactrim 1 tablet BID       Problems adhering to non-medication therapy:  None    Summary of hospitalization:  [facility] discharge summary reviewed  Diagnostic Tests/Treatments reviewed.  Follow up needed: see below  Other Healthcare Providers Involved in Patient s Care:         None  Update since discharge: improved.     Post Discharge Medication Reconciliation: discharge medications reconciled, continue medications without change.  Plan of care communicated with patient     Coding guidelines for this visit:  Type of Medical   Decision Making Face-to-Face Visit       within 7 Days of discharge Face-to-Face Visit        within 14 days of discharge   Moderate Complexity [id number]   High Complexity [id number]          The colonoscopy on 3-18-18 showed:                                       Impression:          - Diverticulosis in the sigmoid colon.                        - One 2 mm polyp in the transverse colon. Treated with a                        monopolar probe.                        - One 3 mm polyp at the splenic flexure. Biopsied.                        - One 5 mm polyp at the splenic flexure, removed with a                        hot snare. Resected and retrieved.                        - One 5 mm polyp in the rectum, removed with a hot                        snare. Resected and retrieved.                        - The distal rectum and anal verge are normal on                        retroflexion view.     The gastroscopy on  the same day showed:       Findings:        The nasopharynx and oropharynx were normal.        The examined esophagus was normal.        There is no endoscopic evidence of findings consistent with candidiasis        in the entire esophagus.        The entire examined stomach was normal.        The examined duodenum was normal.                                                                                     Impression:          - Normal nasopharynx and oropharynx.                        - Normal esophagus.                        - Normal stomach.                        - Normal examined duodenum.                        - No specimens collected.   Recommendation:      - Return patient to hospital [facility] for ongoing care.   MEDICATION:  He is not taking the Lactulose due to the taste.  He states what he received in the hospital had a more tolerable taste.       ITCHING:  He states he has the feeling of itching all over.  He states he used to take a medication in the past, as needed for that symptom.  Has been going on for awhile.         Current Outpatient Prescriptions:      sulfamethoxazole-trimethoprim (BACTRIM DS/SEPTRA DS) 800-160 MG per tablet, Take 1 tablet by mouth 2 times daily, Disp: 14 tablet, Rfl: 0     PANTOPRAZOLE SODIUM PO, Take 40 mg by mouth every morning (before breakfast), Disp: , Rfl:      propranolol (INDERAL) 20 MG tablet, Take 1 tablet (20 mg) by mouth 2 times daily, Disp: 90 tablet, Rfl: 3     mometasone-formoterol (DULERA) 200-5 MCG/ACT oral inhaler, Inhale 2 puffs into the lungs 2 times daily , Disp: , Rfl:      tiotropium (SPIRIVA) 18 MCG capsule, Inhale 1 capsule into the lungs daily , Disp: , Rfl:      furosemide (LASIX) 20 MG tablet, Take 20 mg by mouth every morning , Disp: , Rfl:      potassium chloride SA (K-DUR/KLOR-CON M) 10 MEQ CR tablet, Take 1 tablet (10 mEq) by mouth daily, Disp: , Rfl:      albuterol (PROAIR HFA/PROVENTIL HFA/VENTOLIN HFA) 108 (90 BASE) MCG/ACT Inhaler, Inhale 2  "puffs into the lungs every 4 hours as needed, Disp: 1 Inhaler, Rfl: 11     desonide (DESOWEN) 0.05 % lotion, Apply topically as needed, Disp: 118 mL, Rfl: 1     Melatonin ER 5 MG TBCR, Take 1 tablet by mouth At Bedtime, Disp: , Rfl:      SODIUM BICARBONATE PO, Take 650 mg by mouth daily as needed , Disp: , Rfl:      TRAZODONE HCL PO, Take 50 mg by mouth At Bedtime , Disp: , Rfl:      loratadine (CLARITIN) 10 MG tablet, Take 1 tablet by mouth daily as needed for allergies., Disp: 30 tablet, Rfl: 6     acetaminophen (TYLENOL) 500 MG tablet, Take 1-2 tablets by mouth every 6 hours as needed., Disp: , Rfl:      lactulose (CHRONULAC) 10 GM/15ML solution, Take 6.7 g by mouth 2 times daily , Disp: , Rfl:       Patient Active Problem List   Diagnosis     Essential hypertension     Acute myeloid leukemia in remission (H)     Sensorineural hearing loss, asymmetrical     Alcoholic cirrhosis of liver (H)     Acute alcoholic hepatitis     Coagulation defect (H)     Osteoarthrosis     Hemorrhage of gastrointestinal tract     Thrombocytopenia (H)     Universal ulcerative (chronic) colitis(556.6) (H)     CKD (chronic kidney disease) stage 3, GFR 30-59 ml/min     Rosacea     Mild intermittent asthma without complication     GI bleed     Peptic ulcer disease     Uncomplicated alcohol dependence (H)     Tobacco dependence syndrome     Tubular adenoma of colon       Blood pressure 108/61, pulse 72, temperature 98.8  F (37.1  C), temperature source Tympanic, height 5' 6\" (1.676 m), weight 183 lb (83 kg), SpO2 97 %.    Exam:  GENERAL APPEARANCE: healthy, alert and no distress  EYES: EOMI,  PERRL  NECK: no adenopathy, no asymmetry, masses, or scars and thyroid normal to palpation  RESP: lungs clear to auscultation - no rales, rhonchi or wheezes  CV: regular rates and rhythm, normal S1 S2, no S3 or S4 and no murmur, click or rub -  ABDOMEN: tender over the epigastrium  PSYCH: mentation appears normal and affect normal/bright      (D50.9) " Iron deficiency anemia, unspecified iron deficiency anemia type  (primary encounter diagnosis)  Comment:   Plan: Asthma Action Plan (AAP), Hemoglobin,         Hemoglobin        We reviewed and discussed the labs and there are several issues. The main one is the low Hgb from the bleeding. You were 7.8 and normal is 13.3 or higher.   Start iron pills one daily of Ferrous sulfate at 325 mg per dose. Take with Lemonade or Orange juice but avoid calcium as in dairy products, spinach and certain antacids.   Do the Hgb today and I will order a repeat in 3-4 weeks. It will be done monthly til normal. Once the Hgb is 13.3 or higher, then stop the iron.     (R74.8) Elevated liver enzymes  Comment:   Plan: Creatinine, Hepatic panel        We discussed repeating these and he will do the labs. We will call the results. Stopping alcohol is essential.   Take no Tylenol for now.     (L29.9) Pruritic disorder  Comment:   Plan: Claritin or Benadryl can be taken.     (K29.61) Gastrointestinal hemorrhage associated with other gastritis  Comment:   Plan: do the Hgb today. Iron daily in the form of Ferrous sulfate, 325 mg daily. See above.         Parrish Funk

## 2018-03-29 ENCOUNTER — TELEPHONE (OUTPATIENT)
Dept: FAMILY MEDICINE | Facility: CLINIC | Age: 58
End: 2018-03-29

## 2018-03-29 DIAGNOSIS — M47.818 OSTEOARTHRITIS OF SPINE WITHOUT MYELOPATHY OR RADICULOPATHY, SACRAL AND SACROCOCCYGEAL REGION: Primary | ICD-10-CM

## 2018-03-29 DIAGNOSIS — K59.00 CONSTIPATION, UNSPECIFIED CONSTIPATION TYPE: Primary | ICD-10-CM

## 2018-03-29 DIAGNOSIS — K21.9 GASTROESOPHAGEAL REFLUX DISEASE WITHOUT ESOPHAGITIS: ICD-10-CM

## 2018-03-29 RX ORDER — LACTULOSE 10 G/15ML
6.7 SOLUTION ORAL 2 TIMES DAILY
Qty: 300 ML | Refills: 3 | Status: SHIPPED | OUTPATIENT
Start: 2018-03-29 | End: 2018-06-06

## 2018-03-29 ASSESSMENT — ASTHMA QUESTIONNAIRES: ACT_TOTALSCORE: 16

## 2018-03-29 NOTE — TELEPHONE ENCOUNTER
Requested Prescriptions   Pending Prescriptions Disp Refills     lactulose (CHRONULAC) 10 GM/15ML solution  Last Written Prescription Date:  Historical  Last Fill Quantity: 0,  # refills: 0   Last office visit: 3/28/2018 with prescribing provider:  laurence   Future Office Visit:   Next 5 appointments (look out 90 days)     Apr 09, 2018 11:00 AM CDT   Return Visit with Sheila Romano   [facility]    [address] [address]   [phone]                        Sig: Take 10.1 mLs (6.7 g) by mouth 2 times daily    There is no refill protocol information for this order        Zaheer DunnR)

## 2018-03-29 NOTE — TELEPHONE ENCOUNTER
Requested Prescriptions   Pending Prescriptions Disp Refills     lactulose (CHRONULAC) 10 GM/15ML solution       Sig: Take 10.1 mLs (6.7 g) by mouth 2 times daily    There is no refill protocol information for this order        Routing refill request to provider for review/approval because:  Drug not on the [facility] refill protocol   Tiffani Pemberton RNC

## 2018-03-29 NOTE — TELEPHONE ENCOUNTER
Routing refill request to provider for review/approval because:  Drug not active on patient's medication list  Medication is reported/historical  This was from an outside provider. ?? Not appropriate with the elevated creatinine (see below)   Creatinine   Date Value Ref Range Status   03/28/2018 2.02 (H) 0.66 - 1.25 mg/dL Final   ]  Lab Results   Component Value Date    AST 29 03/18/2018     Lab Results   Component Value Date    ALT 22 03/18/2018       Tiffani Pemberton RNC

## 2018-03-29 NOTE — TELEPHONE ENCOUNTER
Script was originally filled by DR Vipin Mckenzie 2/28/18    Ibuprofen 400mg #21 take 1 tablet 3 times daily for 7 days as needed for pain

## 2018-03-30 ENCOUNTER — TELEPHONE (OUTPATIENT)
Dept: FAMILY MEDICINE | Facility: CLINIC | Age: 58
End: 2018-03-30

## 2018-03-30 DIAGNOSIS — K21.9 GASTROESOPHAGEAL REFLUX DISEASE WITHOUT ESOPHAGITIS: ICD-10-CM

## 2018-03-30 DIAGNOSIS — G89.4 CHRONIC PAIN SYNDROME: Primary | ICD-10-CM

## 2018-03-30 RX ORDER — POTASSIUM CHLORIDE 750 MG/1
10 TABLET, EXTENDED RELEASE ORAL DAILY
Qty: 90 TABLET | Status: CANCELLED | OUTPATIENT
Start: 2018-03-30

## 2018-03-30 RX ORDER — FUROSEMIDE 20 MG
20 TABLET ORAL EVERY MORNING
Qty: 30 TABLET | Status: CANCELLED | OUTPATIENT
Start: 2018-03-30

## 2018-03-30 RX ORDER — TRAZODONE HYDROCHLORIDE 50 MG/1
50 TABLET, FILM COATED ORAL AT BEDTIME
Qty: 90 TABLET | Refills: 3 | Status: SHIPPED | OUTPATIENT
Start: 2018-03-30 | End: 2019-05-02

## 2018-03-30 RX ORDER — IBUPROFEN 400 MG/1
400 TABLET, FILM COATED ORAL EVERY 6 HOURS PRN
Qty: 30 TABLET | Refills: 5 | Status: SHIPPED | OUTPATIENT
Start: 2018-03-30 | End: 2018-11-08

## 2018-03-30 RX ORDER — POTASSIUM CHLORIDE 750 MG/1
10 TABLET, EXTENDED RELEASE ORAL DAILY
Qty: 90 TABLET | Refills: 3 | Status: SHIPPED | OUTPATIENT
Start: 2018-03-30 | End: 2019-05-02

## 2018-03-30 RX ORDER — FUROSEMIDE 20 MG
20 TABLET ORAL EVERY MORNING
Qty: 30 TABLET | Refills: 11 | Status: SHIPPED | OUTPATIENT
Start: 2018-03-30 | End: 2019-01-17

## 2018-03-30 RX ORDER — TRAZODONE HYDROCHLORIDE 50 MG/1
50 TABLET, FILM COATED ORAL AT BEDTIME
Qty: 90 TABLET | Status: CANCELLED | OUTPATIENT
Start: 2018-03-30

## 2018-03-30 NOTE — TELEPHONE ENCOUNTER
potassium chloride (K-TAB,KLOR-CON) 10 MEQ tablet (Discontinued)   3/16/2017 1/24/2018     furosemide (LASIX) 20 MG tablet (Discontinued)   11/13/2017 1/24/2018      traZODone (DESYREL) 50 MG tablet (Discontinued)   3/16/2017 1/24/2018     Patient reports that he is out of the above medications  Chart reflects these medications were discontinued on 1/24/18  LOV 1/24/18, notes did not reflect discontinuing medication reason  Note from [facility] does not reflect discontinuing these medications  Please advise    Routing refill request to provider for review/approval because:  Medication is reported/historical  Pharm jose ESTRADA Rn

## 2018-03-30 NOTE — TELEPHONE ENCOUNTER
Reason for Call: Request for an order or referral:    Order or referral being requested: referral     Date needed: as soon as possible    Has the patient been seen by the PCP for this problem? YES    Additional comments: pt calling to see if he can get a referral to the pain clinic. He states he is in pain 24/7 and ibuprofen and tylenol aren't touching the pain. He said Dr Funk won't give him anything unless he goes to the pain clinic.    Phone number Patient can be reached at:  Home number on file [phone] (home)    Best Time:  any    Can we leave a detailed message on this number?  YES    Call taken on 3/30/2018 at 3:38 PM by Neisha Melendrez

## 2018-03-30 NOTE — TELEPHONE ENCOUNTER
Please notify prescription sent to pharmacy.  I reordered the three meds. They were on his list of meds at the recent clinic visit. Parrish Funk

## 2018-03-30 NOTE — TELEPHONE ENCOUNTER
"Requested Prescriptions   Pending Prescriptions Disp Refills     furosemide (LASIX) 20 MG tablet  Last Written Prescription Date:  historical  Last Fill Quantity: 0,  # refills: 0   Last office visit: 3/28/2018 with prescribing provider:  Tosteson   Future Office Visit:   Next 5 appointments (look out 90 days)     Apr 09, 2018 11:00 AM CDT   [facility] Visit with Sheila Romano   [facility]    [address] [facility] [address]   [phone]                  30 tablet      Sig: Take 1 tablet (20 mg) by mouth every morning    Diuretics (Including Combos) Protocol Failed    3/30/2018 11:49 AM       Failed - Normal serum creatinine on file in past 12 months    Recent Labs   Lab Test  03/28/18   1444   CR  2.02*             Passed - Blood pressure under 140/90 in past 12 months    BP Readings from Last 3 Encounters:   03/28/18 108/61   03/18/18 133/40   02/21/18 130/60                Passed - Recent (12 mo) or future (30 days) visit within the authorizing provider's specialty    Patient had office visit in the last 12 months or has a visit in the next 30 days with authorizing provider or within the authorizing provider's specialty.  See \"Patient Info\" tab in inbasket, or \"Choose Columns\" in Meds & Orders section of the refill encounter.           Passed - Patient is age 18 or older       Passed - Normal serum potassium on file in past 12 months    Recent Labs   Lab Test  03/18/18   0624   POTASSIUM  3.6                   Passed - Normal serum sodium on file in past 12 months    Recent Labs   Lab Test  03/18/18   0624   NA  140              potassium chloride SA (K-DUR/KLOR-CON M) 10 MEQ CR tablet  Last Written Prescription Date:  Historical  Last Fill Quantity: 0,  # refills: 0   Last office visit: 3/28/2018 with prescribing provider:  Tosteson   Future Office Visit:   Next 5 appointments (look out 90 days)     Apr 09, 2018 11:00 AM CDT   Return Visit with Ai RUST" "Sheila Min   [facility]    [address] [facility] [address] [address]   [phone]                  90 tablet      Sig: Take 1 tablet (10 mEq) by mouth daily    Potassium Supplements Protocol Passed    3/30/2018 11:49 AM       Passed - Recent (12 mo) or future (30 days) visit within the authorizing provider's specialty    Patient had office visit in the last 12 months or has a visit in the next 30 days with authorizing provider or within the authorizing provider's specialty.  See \"Patient Info\" tab in inbasket, or \"Choose Columns\" in Meds & Orders section of the refill encounter.           Passed - Patient is age 18 or older       Passed - Normal serum potassium in past 12 months    Recent Labs   Lab Test  03/18/18   0624   POTASSIUM  3.6                    traZODone (DESYREL) 50 MG tablet  Last Written Prescription Date:  historical  Last Fill Quantity: 0,  # refills: 0   Last office visit: 3/28/2018 with prescribing provider:  Good   Future Office Visit:   Next 5 appointments (look out 90 days)     Apr 09, 2018 11:00 AM CDT   Return Visit with Sheila Romano   [facility]    [address] [address]   [phone]                  90 tablet      Sig: Take 1 tablet (50 mg) by mouth At Bedtime    Serotonin Modulators Passed    3/30/2018 11:49 AM       Passed - Recent (12 mo) or future (30 days) visit within the authorizing provider's specialty    Patient had office visit in the last 12 months or has a visit in the next 30 days with authorizing provider or within the authorizing provider's specialty.  See \"Patient Info\" tab in inbasket, or \"Choose Columns\" in Meds & Orders section of the refill encounter.           Passed - Patient is age 18 or older        Melatonin ER 5 MG TBCR  Last Written Prescription Date:  historical  Last Fill Quantity: 0,  # refills: 0   Last office visit: 3/28/2018 with " prescribing provider:  Good   Future Office Visit:   Next 5 appointments (look out 90 days)     Apr 09, 2018 11:00 AM CDT   Return Visit with Sheila Romano   [facility] ([facility])    [address] [address]   [phone]                        Sig: Take 1 tablet by mouth At Bedtime    There is no refill protocol information for this order        Zaheer LANE (R)

## 2018-03-30 NOTE — TELEPHONE ENCOUNTER
His request was not associated with a specific concern. I told him narcotics were not appropriate for his diagnoses.   I need to know for what reason he wants to be seen at the pain clinic. Which diagnoses or symptoms are involved?  Parrish Funk

## 2018-04-02 RX ORDER — MAGNESIUM OXIDE 500 MG
1 TABLET ORAL AT BEDTIME
Qty: 30 TABLET | Refills: 11 | Status: SHIPPED | OUTPATIENT
Start: 2018-04-02 | End: 2019-02-01

## 2018-04-02 NOTE — TELEPHONE ENCOUNTER
"Pt called back.  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  Pt says that he has severe neck and back pain that \"brings tears to my eyes if I brush my fingers across the back of my neck.\"  Pt describes back pain radiates into his legs and that his legs feel numb at times.    Pt insists that he talked to Dr Funk about this at his office visit last week.    Pt asks for referral to pain clinic?  He is seeking other treatment modalities which may relieve his pain.    Zulema Gramajo RN    "

## 2018-04-02 NOTE — TELEPHONE ENCOUNTER
At our last clinic visit chronic pain was not addressed. I did the Pain Management referral and he should call to get that appt.   I would need to see him in clinic just to address the pain issue, with no other problems discussed at that appt.   Parrish Funk

## 2018-04-02 NOTE — TELEPHONE ENCOUNTER
Routing refill request to provider for review/approval because:  Medication is reported/historical    Rena Mcconnell RN

## 2018-04-03 ENCOUNTER — TELEPHONE (OUTPATIENT)
Dept: PALLIATIVE MEDICINE | Facility: CLINIC | Age: 58
End: 2018-04-03

## 2018-04-03 NOTE — TELEPHONE ENCOUNTER
Pain Management Center Referral      1. Confirmed address with patient? Yes  2. Confirmed phone number with patient? Yes  3. Confirmed referring provider? Yes  4. Is the PCP the same as the referring provider? Yes  5. Has the patient been to any previous pain clinics? Yes - [facility]   (If yes, send GITA with welcome letter)  6. Which insurance are we to bill for this appointment?  [employer]    7. Informed pt of cancellation (48 hour) policy? Yes    REGARDING OPIOID MEDICATIONS: We will always address appropriateness of opioid pain medications, but we generally will not automatically take on a prescribing role. When we do take on prescribing of opioids for chronic pain, it is in collaboration with the referring physician for an intermediate period of time (months), with an expectation that the primary physician or provider will assume the prescribing role if medications are effective at stable doses with demonstrated compliance. Therefore, please do not assume that your prescribing responsibilities end on the day of pain clinic consultation.  7. Informed pt of prescribing policy? Yes      8. Referring Provider: Parrish Funk    [facility]

## 2018-04-03 NOTE — LETTER
April 3, 2018    Abhijeet Mccauley  [address] [place] [id number]    Dear Abhijeet,          Welcome to the [facility].  We are located in the Orthopedics Center of the [facility], located at [address] [place] [address] Your appointment at the [facility] has been scheduled on Thursday, July 5TH at 1:00PM with Landen Quiñones MD.    At your first visit, you will meet your team of caregivers who will help you to develop pain management strategies that will last a lifetime. You will meet with our support staff to review your insurance information, and collect your co-payment if required by your insurance company. You will also meet with a medical pain specialist and care coordinator who will assess your pain and develop a plan of care for your successful pain rehabilitation. You should expect to spend 1-2 hours at your first visit with us. Usually, patients work with us for a period of 6-12 months, and eventually return to their primary doctor once their pain management has stabilized.      To help us make your visit go as smoothly as possible, please bring the following items with you on your visit:     Completed Pain Questionnaire enclosed in this packet.  If you do not bring the completed questionnaire, we may have to reschedule your appointment.  List of any medicines that you are currently taking or have been prescribed  Important NON-[place] medical information such as medical records or tests results (X-rays, or laboratory tests)  Your health insurance card  Financial resources to cover your co-payment or balance due at the time of service (cash, personal check, Visa, and MasterCard are acceptable methods of payment)     Due to the demand for new patient evaluations, you must notify the scheduling department 48 hours in advance if you are not able to keep this appointment. Failure to do so could affect your ability to reschedule with our clinic. Please  be aware that we will not prescribe any medications at your first visit.     Please call [phone] with any questions regarding your appointment. We look forward to meeting you and working to address your health care needs.     Sincerely,      [facility]

## 2018-04-03 NOTE — LETTER
May 10, 2018    Abhijeet Mccauley  [address] [place] [id number]    Dear Abhijeet,                                                               Welcome to the [facility].  We are located in the Orthopedics Center of the [facility], located at [address] [place] [address] Your appointment at the [facility] has been scheduled on Thursday, AUGUST 2ND  at 1:00PM with Landen Quiñones MD.    At your first visit, you will meet your team of caregivers who will help you to develop pain management strategies that will last a lifetime. You will meet with our support staff to review your insurance information, and collect your co-payment if required by your insurance company. You will also meet with a medical pain specialist and care coordinator who will assess your pain and develop a plan of care for your successful pain rehabilitation. You should expect to spend 1-2 hours at your first visit with us. Usually, patients work with us for a period of 6-12 months, and eventually return to their primary doctor once their pain management has stabilized.      To help us make your visit go as smoothly as possible, please bring the following items with you on your visit:     Completed Pain Questionnaire enclosed in this packet.  If you do not bring the completed questionnaire, we may have to reschedule your appointment.  List of any medicines that you are currently taking or have been prescribed  Important NON-[place] medical information such as medical records or tests results (X-rays, or laboratory tests)  Your health insurance card  Financial resources to cover your co-payment or balance due at the time of service (cash, personal check, Visa, and MasterCard are acceptable methods of payment)     Due to the demand for new patient evaluations, you must notify the scheduling department 48 hours in advance if you are not able to keep this appointment. Failure to do so could  affect your ability to reschedule with our clinic. Please be aware that we will not prescribe any medications at your first visit.     Please call [phone] with any questions regarding your appointment. We look forward to meeting you and working to address your health care needs.     Sincerely,      [facility]

## 2018-04-04 NOTE — H&P
[facility]    History and Physical  Hospital Medicine       Date of Admission:  3/16/2018  Date of Service: 3/16/2018     CC: bright red blood per rectum    Assessment & Plan   Abhijeet Mccauley is a 57 year old male with a past medical history significant for PUD and GI bleeding, chronic kidney disease, AML s/p chemo now in remission, alcohol dependence, alcoholic liver cirrhosis with coagulation defect, ulcerative colitis, copd, tobacco dependence who presents on 3/16/2018 with bright red blood per rectum likely due to a lower GI bleed.      GI bleed, likely lower source / bright red blood per rectum  Occurs in setting of known PUD, history of previous GI bleed, alcohol dependence, and history of varices s/p banding. Was recently hospitalized at [facility] in Jan 2018 for melena, required 3U PRBC, underwent EGD showing grade 2 varices which were banded. Presented on 3/16/18 with hemoglobin 7.4 after 2 episodes of bright red blood per rectum. Hemodynamically stable upon admission.  - Monitor hemoglobin q 6 hours (patient initially refusing blood draws, but now compliant)  - Transfuse if hemoglobin <7.0 - patient has been consented, consent present in paper chart. Patient OK to receive non-irradiated blood (history of AML but no bone marrow transplant, see below - discussed with Lab who discussed with pathology, OK for non-irradiated blood)  - Protonix bid  - Telemetry  - IV NS @ 100  - Clear liquid diet  - Plans for colonoscopy and EGD by general surgery on Sunday after prep      Uncomplicated alcohol dependence (H)  History of withdrawal and seizures in the past. Presented with alcohol level 0.04. No evidence of withdrawal on exam, although potentially at risk for withdrawing.  - CIWA protocol  - Prn Ativan    Alcoholic cirrhosis of liver (H) / Coagulation defect (H)  Previously diagnosed. LFTs normal. INR 1.34, chronically elevated (not on pharmacologic anticoagulation). Is  prescribed lactulose but has not been taking - defer restart to day team.    Leukocytosis  Presented with WBC 14.6, no other evidence of infection. Recheck in am.    Essential hypertension  Previously diagnosed, pressures stable during emergency department course. Taking propranolol and lasix prior to admission - hold lasix, continue propranolol with holding parameters.      Acute myeloid leukemia in remission (H)  Diagnosed in 2008, s/p chemo, did not require bone marrow transplant. Officially in remission since 2012. Treated at [facility]. As above, OK to received irradiated blood products.    Chronic kidney disease  Previously diagnosed. Presented with creatinine 1.80, baseline between 1.9-2.1. Monitior.      Universal ulcerative (chronic) colitis(556.6) (H)  Per chart review. Patient not taking any active medications for this currently.      Mild intermittent asthma without complication  Stable respiratory status. Taking Dulera, Spiriva, and albuterol prior to admission - med rec pending, Dulera & Spiriva ordered. Albuterol nebs ordered.    Tobacco dependence syndrome  Encourage cessation. Nicotine replacement available.        Fluids: IV NS @ 100  Electrolytes: Monitor  Nutrition: Clear liquid    DVT Prophylaxis: Pneumatic Compression Devices, no pharmacologic anticoagulation due to GI bleed  Code Status: Full Code - discussed directly with patient    Lines: Peripheral  Chong catheter: Not indicated    Disposition: Anticipate discharge in 2+ day(s). Appropriate for inpatient care.    Jessy Bishop PA-C  [facility] Hospitalist Service  Pager: [phone]          Primary Care Physician   Parrish Funk [phone]    History is obtained from the patient and review of old records via the EMR.    Past Medical History      Past Medical History:   Diagnosis Date     Alcohol dependence (H)     History of withdrawal and seizures     Alcoholic cirrhosis of liver (H)      AML (acute myeloid leukemia) in  "remission (H)     s/p chemo, no bone marrow transplant     COPD (chronic obstructive pulmonary disease) (H)      History of pulmonary embolus (PE)      Hypertension      PUD (peptic ulcer disease)      Tobacco dependence      Ulcerative colitis (H)        Past Surgical History     Past Surgical History:   Procedure Laterality Date     AS TOTAL KNEE ARTHROPLASTY Left      ESOPHAGOSCOPY, GASTROSCOPY, DUODENOSCOPY (EGD), COMBINED N/A 2/8/2018    Procedure: COMBINED ESOPHAGOSCOPY, GASTROSCOPY, DUODENOSCOPY (EGD), BIOPSY SINGLE OR MULTIPLE;  gastroscopy with biopsies;  Surgeon: Raz Cobb MD;  Location: [facility]     ESOPHAGOSCOPY, GASTROSCOPY, DUODENOSCOPY (EGD), COMBINED N/A 3/18/2018    Procedure: COMBINED ESOPHAGOSCOPY, GASTROSCOPY, DUODENOSCOPY (EGD);;  Surgeon: Raz Cobb MD;  Location: [facility]     LACERATION REPAIR Right     Right leg        History of Present Illness   Abhijeet Mccauley is a 57 year old male with the above past medical history now presents on 3/16/2018 with 2 episodes of recent bright red blood per rectum. First episode was yesterday morning, second episode this morning. On both occasions there was stool in with the blood, but he denies melena. He does have a history of melena but states his most recent stools were non-melenotic. He denies any abdominal pain or pain associated with defecation. He had nausea today with one episode vomiting but denies hematemesis. He denies any associated dizziness, falls, or syncope. He is a heavy alcohol drinker and has a history of withdrawal and seizures. He admits to 4-6 beers per day, last drink was yesterday afternoon.    He states that he was hospitalized at [facility] in January 2018 for melena and needed 3U blood while there. He had an EGD while there and \"had something clipped.\" He had a repeat EGD through [facility] on 2/8/18 without evidence of active bleeding. Patient is in the process of transferring his care from [facility] to the [facility] " "system now that he has moved to [place], [place]. He voices no other complaints or concerns at this time other than not wanting his blood drawn so often.        Prior to Admission Medications   Prior to Admission Medications   Prescriptions Last Dose Informant Patient Reported? Taking?   PANTOPRAZOLE SODIUM PO 3/16/2018 at am  Yes Yes   Sig: Take 40 mg by mouth every morning (before breakfast)   SODIUM BICARBONATE PO 3/16/2018 at Unknown time Self Yes Yes   Sig: Take 650 mg by mouth daily as needed    acetaminophen (TYLENOL) 500 MG tablet Past Week at Unknown time Self Yes Yes   Sig: Take 1-2 tablets by mouth every 6 hours as needed.   albuterol (PROAIR HFA/PROVENTIL HFA/VENTOLIN HFA) 108 (90 BASE) MCG/ACT Inhaler More than a month at Unknown time  No No   Sig: Inhale 2 puffs into the lungs every 4 hours as needed   desonide (DESOWEN) 0.05 % lotion   No No   Sig: Apply topically as needed   loratadine (CLARITIN) 10 MG tablet 3/16/2018 at Unknown time Self No Yes   Sig: Take 1 tablet by mouth daily as needed for allergies.   mometasone-formoterol (DULERA) 200-5 MCG/ACT oral inhaler 3/16/2018 at am  Yes Yes   Sig: Inhale 2 puffs into the lungs 2 times daily    propranolol (INDERAL) 20 MG tablet 3/16/2018 at am  No Yes   Sig: Take 1 tablet (20 mg) by mouth 2 times daily   tiotropium (SPIRIVA) 18 MCG capsule 3/16/2018 at Unknown time  Yes Yes   Sig: Inhale 1 capsule into the lungs daily       Facility-Administered Medications: None     Allergies   Allergies   Allergen Reactions     Blood Transfusion Related (Informational Only)      Patient has a history of a clinically significant antibody against RBC antigens.  A delay in compatible RBCs may occur.     Famotidine Unknown and Other (See Comments)     Severe abdominal cramps     Allergy      Ibuprofen     Cyclobenzaprine      hives     Gabapentin      Made pt's \"face break out\"     Methocarbamol      Made pt's \"face break out\"     Motrin [Nsaids] Unknown and Hives     As " "reviewed in H/P of 3/2/12 Dr. Clint Plata Cramps     Severe abdominal cramps     Vancomycin      Other reaction(s): Other  hallucinations     Vfend      IV     Hydrocodone-Acetaminophen Rash       Family History    Family History   Problem Relation Age of Onset     Hypertension Mother      Coronary Artery Disease Father      MI       Social History   Social History     Social History     Marital status: Single     Spouse name: N/A     Number of children: N/A     Years of education: N/A     Occupational History     Not on file.     Social History Main Topics     Smoking status: Current Every Day Smoker     Packs/day: 0.50     Years: 30.00     Types: Cigarettes     Smokeless tobacco: Never Used      Comment: declined quit plan, getting lower than 1/2 ppd     Alcohol use Yes      Comment: Down to 3 beers per day.  Sometimes a cocktail also.     Drug use: Yes     Special: Marijuana     Sexual activity: Not on file     Other Topics Concern     Not on file     Social History Narrative       Review of Systems     General: Denies fever, chills, recent illness, weight changes, loss of appetite, or fatigue.  HEENT: Denies vertigo, vision changes, nasal congestion.  Pulmonary: Denies coughing, wheezing, shortness of breath.  Cardiovascular: Denies chest pain, palpitations, claudication, edema.  Gastrointestinal: See HPI regarding bright red blood per rectum, nausea, vomiting.  Denies abdominal pain, diarrhea, constipation, melena.  Genitourinary: Denies frequency, dysuria, and hematuria.  Neurologic: Denies dizziness, lightheadedness, fainting, falls, numbness, tingling, weakness, headache.   Musculoskeletal: Denies joint and muscle pains.   Skin: Denies rashes, unexplained bruising, lesions.   Endocrinology: Denies excessive thirst, urination, sweating, and heat or cold intolerance.      Physical Exam   /40 (BP Location: Right arm)  Pulse 69  Temp 97.8  F (36.6  C) (Oral)  Resp 16  Ht 1.676 m (5' 6\")  Wt " 83.9 kg (184 lb 15.5 oz)  SpO2 99%  BMI 29.85 kg/m2     Weight: 184 lbs 15.46 oz Body mass index is 29.85 kg/(m^2).     Constitutional: Alert, oriented, cooperative, no apparent distress, appears nontoxic, speaking in full sentences.     Eyes: Eyes are clear, pupils are reactive. No scleral icterus. Extroccular movements intact.     HEENT: Oropharynx is clear and moist, no lesions. Normocephalic, no evidence of cranial trauma.      Cardiovascular: Regular rhythm and rate, normal S1 and S2. No murmur, rubs, or gallops. Peripheral pulses in tact bilaterally. No lower extremity edema.     Respiratory: Lung sounds are clear to auscultation bilaterally without wheezes, rhonchi, or crackles.    GI: Soft, non-distended. Non-tender, no rebound or guarding. No hepatosplenomegaly or masses appreciated. Normal bowel sounds.    Musculoskeletal: Without obvious deformity, normal range of motion. Normal muscle bulk and tone.     Skin: Warm and dry, no rashes or ecchymoses. No mottling of skin.     Neurologic: Patient moves all extremities. Cranial nerves are grossly intact.  is symmetric. Gross strength and sensation are equal bilaterally. No tongue fasciculations, non-tremulous.     Genitourinary: Deferred    Data   Data reviewed today:   No lab results found in last 7 days.    No results found for this or any previous visit (from the past 24 hour(s)).    I personally reviewed no images or EKG's today.    Jessy Bishop PA-C  [facility]  Pager: [phone]

## 2018-04-09 ENCOUNTER — OFFICE VISIT (OUTPATIENT)
Dept: AUDIOLOGY | Facility: CLINIC | Age: 58
End: 2018-04-09
Payer: COMMERCIAL

## 2018-04-09 DIAGNOSIS — H90.6 MIXED CONDUCTIVE AND SENSORINEURAL HEARING LOSS OF BOTH EARS: Primary | ICD-10-CM

## 2018-04-09 DIAGNOSIS — H90.3 SENSORINEURAL HEARING LOSS, ASYMMETRICAL: Primary | ICD-10-CM

## 2018-04-09 PROCEDURE — V5266 BATTERY FOR HEARING DEVICE: HCPCS | Performed by: AUDIOLOGIST

## 2018-04-09 PROCEDURE — 92557 COMPREHENSIVE HEARING TEST: CPT | Performed by: AUDIOLOGIST

## 2018-04-09 PROCEDURE — 99207 ZZC NO CHARGE LOS: CPT | Performed by: AUDIOLOGIST

## 2018-04-09 PROCEDURE — 92567 TYMPANOMETRY: CPT | Performed by: AUDIOLOGIST

## 2018-04-09 NOTE — PROGRESS NOTES
AUDIOLOGY REPORT    SUBJECTIVE:  Abhijeet Mccauley is a 57 year old male who was seen in the Audiology Clinic at [facility] for an audiologic evaluation, referred by DR. Funk.  The patient has been seen previously in this clinic for assessment and results indicated an asymmetrical mild to severe sensorineural hearing loss bilaterally. The patient reports he is not hearing as well when he is not wearing his hearing aids. . The patient denies bilateral otalgia and bilateral drainage.     OBJECTIVE:  Otoscopic exam indicates ears are clear of cerumen bilaterally     Pure Tone Thresholds assessed using conventional audiometry with good  reliability from 250-8000 Hz bilaterally using circumaural headphones     RIGHT:  moderate-severe sensorineural hearing loss    LEFT:    mild and severe sensorineural hearing loss    Tympanogram:    RIGHT: normal eardrum mobility    LEFT:   normal eardrum mobility    Speech Reception Threshold:    RIGHT: 30 dB HL    LEFT:   50 dB HL  Word Recognition Score:     RIGHT: 72% at 75 dB HL using W22 recorded word list.    LEFT:   64% at 80 dB HL using W22 recorded word list.      ASSESSMENT:   Mild sloping to moderate-severe in the right ear and a severe in the left ear asymmetrical sensorineural hearing loss bilaterally.    Compared to patient's previous audiogram dated 2/13/2017, hearing has improved slightly in the higher tones bilaterally.  Today s results were discussed with the patient in detail.     PLAN: Patient was given 30 size 312 hearing aid batteries and additional wax guards. Verified date of last battery dispensing. Return to clinic for rechecks as needed.        Ai Min M.A. [facility]  Clinical audiologist [place] # [id number]  4/9/2018

## 2018-04-09 NOTE — MR AVS SNAPSHOT
"              After Visit Summary   4/9/2018    Abhijeet Mccauley    MRN: [id number]           Patient Information     Date Of Birth          1960        Visit Information        Provider Department      4/9/2018 11:00 AM Ai Min AuD [facility]        Today's Diagnoses     Sensorineural hearing loss, asymmetrical    -  1       Follow-ups after your visit        Your next 10 appointments already scheduled     Jul 05, 2018  1:00 PM CDT   New Visit with Landen Quiñones MD   [facility] ([facility])    [address]  [address]  [facility] [address] [phone]              Who to contact     If you have questions or need follow up information about today's clinic visit or your schedule please contact [facility] directly at [phone].  Normal or non-critical lab and imaging results will be communicated to you by MyChart, letter or phone within 4 business days after the clinic has received the results. If you do not hear from us within 7 days, please contact the clinic through MyChart or phone. If you have a critical or abnormal lab result, we will notify you by phone as soon as possible.  Submit refill requests through [employer] or call your pharmacy and they will forward the refill request to us. Please allow 3 business days for your refill to be completed.          Additional Information About Your Visit        MyChart Information     [employer] lets you send messages to your doctor, view your test results, renew your prescriptions, schedule appointments and more. To sign up, go to [email or web address] . Click on \"Log in\" on the left side of the screen, which will take you to the Welcome page. Then click on \"Sign up Now\" on the right side of the page.     You will be asked to enter the access code listed below, as well as some personal information. Please follow the directions to create your username " and password.     Your access code is: [email or web address]  Expires: 2018 12:42 PM     Your access code will  in 90 days. If you need help or a new code, please call your [place] clinic or [phone].        Care EveryWhere ID     This is your Care EveryWhere ID. This could be used by other organizations to access your [place] medical records  [id number]         Blood Pressure from Last 3 Encounters:   18 108/61   18 133/40   18 130/60    Weight from Last 3 Encounters:   18 183 lb (83 kg)   18 184 lb 15.5 oz (83.9 kg)   18 188 lb (85.3 kg)              We Performed the Following     AUDIOGRAM/TYMPANOGRAM - INTERFACE     COMPREHENSIVE HEARING TEST     HC HEARING DEVICE BATTERY     TYMPANOMETRY        Primary Care Provider Office Phone # Fax #    Parrish Kristopher Funk [phone] [address] [facility] [address]        Equal Access to Services     JORGE COOL : Hadii aad ku hadasho Soomaali, waaxda luqadaha, qaybta kaalmada adeegyada, waxay idiin hayaan cata chowdhury . So [facility] [phone].    ATENCIÓN: Si habla español, tiene a uriostegui disposición servicios gratuitos de asistencia lingüística. Llame al [phone].    We comply with applicable federal civil rights laws and [place] laws. We do not discriminate on the basis of race, color, national origin, age, disability, sex, sexual orientation, or gender identity.            Thank you!     Thank you for choosing [facility]  for your care. Our goal is always to provide you with excellent care. Hearing back from our patients is one way we can continue to improve our services. Please take a few minutes to complete the written survey that you may receive in the mail after your visit with us. Thank you!             Your Updated Medication List - Protect others around you: Learn how to safely use, store and throw away your medicines at www.disposemymeds.org.          This list is  accurate as of 4/9/18 12:42 PM.  Always use your most recent med list.                   Brand Name Dispense Instructions for use Diagnosis    acetaminophen 500 MG tablet    TYLENOL     Take 1-2 tablets by mouth every 6 hours as needed.        albuterol 108 (90 BASE) MCG/ACT Inhaler    PROAIR HFA/PROVENTIL HFA/VENTOLIN HFA    1 Inhaler    Inhale 2 puffs into the lungs every 4 hours as needed    Mild intermittent asthma without complication       desonide 0.05 % lotion    DESOWEN    118 mL    Apply topically as needed    Rosacea       furosemide 20 MG tablet    LASIX    30 tablet    Take 1 tablet (20 mg) by mouth every morning    Osteoarthritis of spine without myelopathy or radiculopathy, sacral and sacrococcygeal region       ibuprofen 400 MG tablet    ADVIL/MOTRIN    30 tablet    Take 1 tablet (400 mg) by mouth every 6 hours as needed for moderate pain    Osteoarthritis of spine without myelopathy or radiculopathy, sacral and sacrococcygeal region       lactulose 10 GM/15ML solution    CHRONULAC    300 mL    Take 10.1 mLs (6.7 g) by mouth 2 times daily    Constipation, unspecified constipation type       loratadine 10 MG tablet    CLARITIN    30 tablet    Take 1 tablet by mouth daily as needed for allergies.    Allergic rhinitis       Melatonin ER 5 MG Tbcr     30 tablet    Take 1 tablet by mouth At Bedtime    Gastroesophageal reflux disease without esophagitis       mometasone-formoterol 200-5 MCG/ACT oral inhaler    DULERA     Inhale 2 puffs into the lungs 2 times daily        PANTOPRAZOLE SODIUM PO      Take 40 mg by mouth every morning (before breakfast)        potassium chloride SA 10 MEQ CR tablet    K-DUR/KLOR-CON M    90 tablet    Take 1 tablet (10 mEq) by mouth daily    Gastroesophageal reflux disease without esophagitis       propranolol 20 MG tablet    INDERAL    90 tablet    Take 1 tablet (20 mg) by mouth 2 times daily    Gastroesophageal reflux disease without esophagitis       SODIUM BICARBONATE PO       Take 650 mg by mouth daily as needed        sulfamethoxazole-trimethoprim 800-160 MG per tablet    BACTRIM DS/SEPTRA DS    14 tablet    Take 1 tablet by mouth 2 times daily    Acute cystitis without hematuria       tiotropium 18 MCG capsule    SPIRIVA     Inhale 1 capsule into the lungs daily        traZODone 50 MG tablet    DESYREL    90 tablet    Take 1 tablet (50 mg) by mouth At Bedtime    Osteoarthritis of spine without myelopathy or radiculopathy, sacral and sacrococcygeal region

## 2018-05-01 ENCOUNTER — NURSE TRIAGE (OUTPATIENT)
Dept: NURSING | Facility: CLINIC | Age: 58
End: 2018-05-01

## 2018-05-01 ENCOUNTER — TELEPHONE (OUTPATIENT)
Dept: FAMILY MEDICINE | Facility: CLINIC | Age: 58
End: 2018-05-01

## 2018-05-01 NOTE — TELEPHONE ENCOUNTER
I spoke with pt.   He was audibly wheezing while on the phone with me.  Pt is not able to talk without needed to stop and catch his breath.  Pt says that he gasps for air at times.  Pt says that this has been going on for 1 week.    Advised ED evaluation now.  Advised call 911 if in breathing distress.  Pt denies distress and states he will be seen in ED now.    Zulema Gramaoj RN

## 2018-05-01 NOTE — TELEPHONE ENCOUNTER
"Reason for call:  Patient reporting a symptom    Symptom or request: shortness of breath    Duration (how long have symptoms been present): on going    Have you been treated for this before? No    Additional comments: pt calling stating he just doesn't feel good. He has been short of breath, complained of his sinuses, he stopped taking his iron pills because they gave him a \"really bad stomachache\". He states it put him on the floor in tears. He scheduled an appt for tomorrow with Dr Valenzuela at 320. Call transferred to Zulema DIA.    Phone Number patient can be reached at:  Home number on file [phone] (home)    Best Time:  any    Can we leave a detailed message on this number:  YES    Call taken on 5/1/2018 at 4:44 PM by Neisha Melendrez    "

## 2018-05-01 NOTE — TELEPHONE ENCOUNTER
Patient was calling to verify what time his appointment is for tomorrow. Reported that it is at 3:20 pm. Patient also reported that he spoke to a nurse earlier today that advised him to go to the ER due to shortness of breath but he does not want to go and will just wait for his appointment tomorrow to be seen. During call, patient was only able to say a few words at a time before stopping to take a breath. Patient reports that he has allergies and that is why he is feeling short of breath. Offered to triage, patient declined. RN advised to call back with any changes, worsening of symptoms, and questions or concerns.     Zulema Garcia RN/[place] [facility]

## 2018-05-02 ENCOUNTER — OFFICE VISIT (OUTPATIENT)
Dept: FAMILY MEDICINE | Facility: CLINIC | Age: 58
End: 2018-05-02
Payer: COMMERCIAL

## 2018-05-02 VITALS
BODY MASS INDEX: 29.67 KG/M2 | OXYGEN SATURATION: 97 % | HEIGHT: 66 IN | TEMPERATURE: 98.8 F | DIASTOLIC BLOOD PRESSURE: 56 MMHG | SYSTOLIC BLOOD PRESSURE: 130 MMHG | WEIGHT: 184.6 LBS | RESPIRATION RATE: 14 BRPM | HEART RATE: 83 BPM

## 2018-05-02 DIAGNOSIS — D50.0 IRON DEFICIENCY ANEMIA DUE TO CHRONIC BLOOD LOSS: ICD-10-CM

## 2018-05-02 DIAGNOSIS — R06.02 SOB (SHORTNESS OF BREATH): ICD-10-CM

## 2018-05-02 DIAGNOSIS — J20.9 ACUTE BRONCHITIS, UNSPECIFIED ORGANISM: Primary | ICD-10-CM

## 2018-05-02 LAB
HGB BLD-MCNC: 7.8 G/DL (ref 13.3–17.7)
RETICS # AUTO: 57.8 10E9/L (ref 25–95)
RETICS/RBC NFR AUTO: 1.8 % (ref 0.5–2)

## 2018-05-02 PROCEDURE — 85018 HEMOGLOBIN: CPT | Performed by: INTERNAL MEDICINE

## 2018-05-02 PROCEDURE — 85045 AUTOMATED RETICULOCYTE COUNT: CPT | Performed by: INTERNAL MEDICINE

## 2018-05-02 PROCEDURE — 36415 COLL VENOUS BLD VENIPUNCTURE: CPT | Performed by: INTERNAL MEDICINE

## 2018-05-02 PROCEDURE — 99214 OFFICE O/P EST MOD 30 MIN: CPT | Performed by: INTERNAL MEDICINE

## 2018-05-02 RX ORDER — PREDNISONE 20 MG/1
40 TABLET ORAL DAILY
Qty: 10 TABLET | Refills: 0 | Status: SHIPPED | OUTPATIENT
Start: 2018-05-02 | End: 2019-02-01

## 2018-05-02 NOTE — PROGRESS NOTES
"  SUBJECTIVE:   Abhijeet Mccauley is a 57 year old male who presents to clinic today for the following health issues:  Chief Complaint   Patient presents with     Cough     x 4 days, difficulty breathing, shortness of breath, sinus issue, patient feels allergy symptoms      ENT Symptoms             Symptoms: cc Present Absent Comment   Fever/Chills   x    Fatigue  x  Very low energy since developing the other symptoms.  Energy level was fine prior   Muscle Aches   x    Eye Irritation  x  A lot of drainage, felt \"glued shut\" this morning.    Sneezing  x     Nasal Edward/Drg  x     Sinus Pressure/Pain  x     Loss of smell   x    Dental pain  x     Sore Throat   x    Swollen Glands   x    Ear Pain/Fullness  x  Trouble hearing, but no pain   Cough x x     Wheeze  x     Chest Pain  x  A lot of heaviness in chest, feels like someone is stepping on chest   Shortness of breath  x     Rash   x    Other  x  Headache - feels like someone is squeezing head     Symptom duration:  x 4 - 5 days    Symptom severity:  moderate    Treatments tried:  allergy medication, Motrin for headache    Contacts:  no known        Abhijeet has a history of asthma noted on his chart, but he denies that he has asthma or that he's ever had PFTs done.  I don't see any PFTs in our or in [facility] records.  He is on Dulera, Spiriva, and albuterol.      Abhijeet also has a history of GI bleeding andcirrhosis and was hospitalized in March for a recurrent bleed and drop in hemoglobin.  He followed up with his PCP and was prescribed iron, but he says he got too much stomach ache from it and had to stop.  He'd like to have his hemoglobin rechecked today.       Problem list and histories reviewed & adjusted, as indicated.  Additional history: as documented    Patient Active Problem List   Diagnosis     Essential hypertension     Acute myeloid leukemia in remission (H)     Sensorineural hearing loss, asymmetrical     Alcoholic cirrhosis of liver (H)     Acute " alcoholic hepatitis     Coagulation defect (H)     Osteoarthrosis     Hemorrhage of gastrointestinal tract     Thrombocytopenia (H)     Universal ulcerative (chronic) colitis(556.6) (H)     CKD (chronic kidney disease) stage 3, GFR 30-59 ml/min     Rosacea     Mild intermittent asthma without complication     GI bleed     Peptic ulcer disease     Uncomplicated alcohol dependence (H)     Tobacco dependence syndrome     Tubular adenoma of colon     Past Surgical History:   Procedure Laterality Date     AS TOTAL KNEE ARTHROPLASTY Left      ESOPHAGOSCOPY, GASTROSCOPY, DUODENOSCOPY (EGD), COMBINED N/A 2/8/2018    Procedure: COMBINED ESOPHAGOSCOPY, GASTROSCOPY, DUODENOSCOPY (EGD), BIOPSY SINGLE OR MULTIPLE;  gastroscopy with biopsies;  Surgeon: Raz Cobb MD;  Location: [facility]     ESOPHAGOSCOPY, GASTROSCOPY, DUODENOSCOPY (EGD), COMBINED N/A 3/18/2018    Procedure: COMBINED ESOPHAGOSCOPY, GASTROSCOPY, DUODENOSCOPY (EGD);;  Surgeon: Raz Cobb MD;  Location: [facility]     LACERATION REPAIR Right     Right leg       Social History   Substance Use Topics     Smoking status: Current Every Day Smoker     Packs/day: 0.50     Years: 30.00     Types: Cigarettes     Smokeless tobacco: Never Used      Comment: declined quit plan, getting lower than 1/2 ppd     Alcohol use Yes      Comment: Down to 3 beers per day.  Sometimes a cocktail also.     Family History   Problem Relation Age of Onset     Hypertension Mother      Coronary Artery Disease Father      MI         Current Outpatient Prescriptions   Medication Sig Dispense Refill     albuterol (PROAIR HFA/PROVENTIL HFA/VENTOLIN HFA) 108 (90 BASE) MCG/ACT Inhaler Inhale 2 puffs into the lungs every 4 hours as needed 1 Inhaler 11     furosemide (LASIX) 20 MG tablet Take 1 tablet (20 mg) by mouth every morning 30 tablet 11     ibuprofen (ADVIL/MOTRIN) 400 MG tablet Take 1 tablet (400 mg) by mouth every 6 hours as needed for moderate pain 30 tablet 5     loratadine (CLARITIN) 10 MG  "tablet Take 1 tablet by mouth daily as needed for allergies. 30 tablet 6     mometasone-formoterol (DULERA) 200-5 MCG/ACT oral inhaler Inhale 2 puffs into the lungs 2 times daily        PANTOPRAZOLE SODIUM PO Take 40 mg by mouth every morning (before breakfast)       potassium chloride SA (K-DUR/KLOR-CON M) 10 MEQ CR tablet Take 1 tablet (10 mEq) by mouth daily 90 tablet 3     predniSONE (DELTASONE) 20 MG tablet Take 2 tablets (40 mg) by mouth daily for 5 days 10 tablet 0     propranolol (INDERAL) 20 MG tablet Take 1 tablet (20 mg) by mouth 2 times daily 90 tablet 3     tiotropium (SPIRIVA) 18 MCG capsule Inhale 1 capsule into the lungs daily        traZODone (DESYREL) 50 MG tablet Take 1 tablet (50 mg) by mouth At Bedtime 90 tablet 3     acetaminophen (TYLENOL) 500 MG tablet Take 1-2 tablets by mouth every 6 hours as needed.       desonide (DESOWEN) 0.05 % lotion Apply topically as needed 118 mL 1     lactulose (CHRONULAC) 10 GM/15ML solution Take 10.1 mLs (6.7 g) by mouth 2 times daily 300 mL 3     Melatonin ER 5 MG TBCR Take 1 tablet by mouth At Bedtime 30 tablet 11     SODIUM BICARBONATE PO Take 650 mg by mouth daily as needed        Allergies   Allergen Reactions     Blood Transfusion Related (Informational Only)      Patient has a history of a clinically significant antibody against RBC antigens.  A delay in compatible RBCs may occur.     Famotidine Unknown and Other (See Comments)     Severe abdominal cramps     Allergy      Ibuprofen     Cyclobenzaprine      hives     Gabapentin      Made pt's \"face break out\"     Methocarbamol      Made pt's \"face break out\"     Motrin [Nsaids] Unknown and Hives     As reviewed in H/P of 3/2/12 Dr. Clint Plata Cramps     Severe abdominal cramps     Vancomycin      Other reaction(s): Other  hallucinations     Vfend      IV     Hydrocodone-Acetaminophen Rash       Reviewed and updated as needed this visit by clinical staff  Tobacco  Allergies  Med Hx  Surg Hx  Fam " "Hx  Soc Hx      Reviewed and updated as needed this visit by Provider         ROS:  Constitutional, HEENT, pulmonary systems are negative, except as otherwise noted.    OBJECTIVE:     /56 (BP Location: Right arm, Patient Position: Chair, Cuff Size: Adult Regular)  Pulse 83  Temp 98.8  F (37.1  C) (Tympanic)  Resp 14  Ht 5' 6\" (1.676 m)  Wt 184 lb 9.6 oz (83.7 kg)  SpO2 97%  BMI 29.8 kg/m2  Body mass index is 29.8 kg/(m^2).    GENERAL: alert, slightly jaundiced, occasional coughing  EYES: Eyes grossly normal to inspection, PERRL and conjunctivae and sclerae normal  HENT:sinuses diffusely tender to percussion, nose and mouth without ulcers or lesions, oropharynx red but no tonsillar exudates  NECK: no adenopathy, no asymmetry, masses, or scars  RESP: normal effort, diffuse expiratory wheezes  CV: regular rate and rhythm, normal S1 S2, no S3 or S4, no murmur, click or rub       ASSESSMENT/PLAN:       1. Acute bronchitis, unspecified organism  2. SOB (shortness of breath)    Abhijeet's symptoms are most consistent with viral etiology and he has diffuse wheezing on exam.  He is on many pulmonary medications (Dulera, Spiriva, and albuterol) and has asthma listed on his problem list, but he denies that he has asthma.  I see no PFTs on file.  He thinks all of his pulm prescriptions are coming from here, but it appears that the Dulera and Spiriva have been prescribed elsewhere.  Suspect he does have either underlying asthma or COPD and has an exacerbation of this current.  Will just start with a course of prednisone and consider adding antibiotic if not improving in a few days.  Continue inhalers and OTCs for symptoms.  Recommend getting PFTs when back to baseline.     - predniSONE (DELTASONE) 20 MG tablet; Take 2 tablets (40 mg) by mouth daily for 5 days  Dispense: 10 tablet; Refill: 0  - General PFT Lab (Please always keep checked); Future  - Pulmonary Function Test; Future    3. Iron deficiency anemia due to " chronic blood loss    He has not been able to tolerate PO iron and would like to recheck his hemoglobin today.  He has not noticed any further blood in the stool.  Hemoglobin has again gone down to 7.8.  Possibly still having some occult blood loss, but will get some labs to check for hemolysis, iron levels, B12, and retic count.  His CKD is likely also a contributory factor.  May consider hematology referral.  Since he has been unable to tolerate PO iron, may want to try IV iron if levels are low.      - Hemoglobin    LoriSheila Valenzuela MD  [facility]

## 2018-05-02 NOTE — PATIENT INSTRUCTIONS
Your infection is most likely viral- we will try the prednisone to help decrease inflammation and improve breathing.  If not improving in a few days, please call and we may need to add an antibiotic.    I have ordered a lung function test (PFT).  Once you are back to baseline, please call [phone] to schedule this.  This will look for asthma and COPD.

## 2018-05-02 NOTE — NURSING NOTE
"Chief Complaint   Patient presents with     Cough     x 4 days, difficulty breathing, shortness of breath, sinus issue, patient feels allergy symptoms        Initial /56 (BP Location: Right arm, Patient Position: Chair, Cuff Size: Adult Regular)  Pulse 83  Temp 98.8  F (37.1  C) (Tympanic)  Resp 14  Ht 5' 6\" (1.676 m)  Wt 184 lb 9.6 oz (83.7 kg)  SpO2 97%  BMI 29.8 kg/m2 Estimated body mass index is 29.8 kg/(m^2) as calculated from the following:    Height as of this encounter: 5' 6\" (1.676 m).    Weight as of this encounter: 184 lb 9.6 oz (83.7 kg).  Medication Reconciliation: complete   April MONDRAGON CMA (AAMA)    "

## 2018-05-02 NOTE — MR AVS SNAPSHOT
After Visit Summary   5/2/2018    Abhijeet Mccauley    MRN: [id number]           Patient Information     Date Of Birth          1960        Visit Information        Provider Department      5/2/2018 3:20 PM Chidi Valenzuela MD [facility]        Today's Diagnoses     Acute bronchitis, unspecified organism    -  1    Iron deficiency anemia due to chronic blood loss        SOB (shortness of breath)          Care Instructions    Your infection is most likely viral- we will try the prednisone to help decrease inflammation and improve breathing.  If not improving in a few days, please call and we may need to add an antibiotic.    I have ordered a lung function test (PFT).  Once you are back to baseline, please call [phone] to schedule this.  This will look for asthma and COPD.           Follow-ups after your visit        Your next 10 appointments already scheduled     Aug 02, 2018  1:00 PM CDT   New Visit with Landen Quiñones MD   [facility] ([facility])    [address]  [address]  [place] [facility] [place] [id number] [phone]              Future tests that were ordered for you today     Open Future Orders        Priority Expected Expires Ordered    General PFT Lab (Please always keep checked) Routine  5/2/2019 5/2/2018    Pulmonary Function Test Routine  5/2/2019 5/2/2018            Who to contact     If you have questions or need follow up information about today's clinic visit or your schedule please contact [facility] directly at [phone].  Normal or non-critical lab and imaging results will be communicated to you by MyChart, letter or phone within 4 business days after the clinic has received the results. If you do not hear from us within 7 days, please contact the clinic through MyChart or phone. If you have a critical or abnormal lab result, we will notify you by phone as soon as possible.  Submit  "refill requests through tag[employer]ALLET or call your pharmacy and they will forward the refill request to us. Please allow 3 business days for your refill to be completed.          Additional Information About Your Visit        [employer] Media Information     [email or web address] lets you send messages to your doctor, view your test results, renew your prescriptions, schedule appointments and more. To sign up, go to [email or web address] . Click on \"Log in\" on the left side of the screen, which will take you to the Welcome page. Then click on \"Sign up Now\" on the right side of the page.     You will be asked to enter the access code listed below, as well as some personal information. Please follow the directions to create your username and password.     Your access code is: [email or web address]  Expires: 2018 12:42 PM     Your access code will  in 90 days. If you need help or a new code, please call your [facility] clinic or [phone].        Care EveryWhere ID     This is your Care EveryWhere ID. This could be used by other organizations to access your [facility] medical records  [id number]        Your Vitals Were     Pulse Temperature Respirations Height Pulse Oximetry BMI (Body Mass Index)    83 98.8  F (37.1  C) (Tympanic) 14 5' 6\" (1.676 m) 97% 29.8 kg/m2       Blood Pressure from Last 3 Encounters:   18 130/56   18 108/61   18 133/40    Weight from Last 3 Encounters:   18 184 lb 9.6 oz (83.7 kg)   18 183 lb (83 kg)   18 184 lb 15.5 oz (83.9 kg)              We Performed the Following     Hemoglobin          Today's Medication Changes          These changes are accurate as of 18  3:41 PM.  If you have any questions, ask your nurse or doctor.               Start taking these medicines.        Dose/Directions    predniSONE 20 MG tablet   Commonly known as:  DELTASONE   Used for:  Acute bronchitis, unspecified organism   Started by:  Chidi Valenzuela MD        Dose:  40 mg   Take 2 tablets (40 mg) by " mouth daily for 5 days   Quantity:  10 tablet   Refills:  0         Stop taking these medicines if you haven't already. Please contact your care team if you have questions.     sulfamethoxazole-trimethoprim 800-160 MG per tablet   Commonly known as:  BACTRIM DS/SEPTRA DS   Stopped by:  Chidi Valenzuela MD                Where to get your medicines      These medications were sent to [facility] [place] [facility] [place], [place] - [address]  [address] [address]     Phone:  [phone]     predniSONE 20 MG tablet                Primary Care Provider Office Phone # Fax #    Parrish Kristopher Funk [phone] [address] [address]        Equal Access to Services     JORGE COOL : Hadii sameera pickeringo Soemily, waaxda luqadaha, qaybta kaalmada adeegyada, song chowdhury . [facility] [phone].    ATENCIÓN: Si habla español, tiene a uriostegui disposición servicios gratuitos de asistencia lingüística. [facility] [phone].    We comply with applicable federal civil rights laws and [place] laws. We do not discriminate on the basis of race, color, national origin, age, disability, sex, sexual orientation, or gender identity.            Thank you!     Thank you for choosing [facility]  for your care. Our goal is always to provide you with excellent care. Hearing back from our patients is one way we can continue to improve our services. Please take a few minutes to complete the written survey that you may receive in the mail after your visit with us. Thank you!             Your Updated Medication List - Protect others around you: Learn how to safely use, store and throw away your medicines at www.disposemymeds.org.          This list is accurate as of 5/2/18  3:41 PM.  Always use your most recent med list.                   Brand Name Dispense Instructions for use Diagnosis    acetaminophen 500 MG tablet    TYLENOL     Take 1-2 tablets  by mouth every 6 hours as needed.        albuterol 108 (90 Base) MCG/ACT Inhaler    PROAIR HFA/PROVENTIL HFA/VENTOLIN HFA    1 Inhaler    Inhale 2 puffs into the lungs every 4 hours as needed    Mild intermittent asthma without complication       desonide 0.05 % lotion    DESOWEN    118 mL    Apply topically as needed    Rosacea       furosemide 20 MG tablet    LASIX    30 tablet    Take 1 tablet (20 mg) by mouth every morning    Osteoarthritis of spine without myelopathy or radiculopathy, sacral and sacrococcygeal region       ibuprofen 400 MG tablet    ADVIL/MOTRIN    30 tablet    Take 1 tablet (400 mg) by mouth every 6 hours as needed for moderate pain    Osteoarthritis of spine without myelopathy or radiculopathy, sacral and sacrococcygeal region       lactulose 10 GM/15ML solution    CHRONULAC    300 mL    Take 10.1 mLs (6.7 g) by mouth 2 times daily    Constipation, unspecified constipation type       loratadine 10 MG tablet    CLARITIN    30 tablet    Take 1 tablet by mouth daily as needed for allergies.    Allergic rhinitis       Melatonin ER 5 MG Tbcr     30 tablet    Take 1 tablet by mouth At Bedtime    Gastroesophageal reflux disease without esophagitis       mometasone-formoterol 200-5 MCG/ACT oral inhaler    DULERA     Inhale 2 puffs into the lungs 2 times daily        PANTOPRAZOLE SODIUM PO      Take 40 mg by mouth every morning (before breakfast)        potassium chloride SA 10 MEQ CR tablet    K-DUR/KLOR-CON M    90 tablet    Take 1 tablet (10 mEq) by mouth daily    Gastroesophageal reflux disease without esophagitis       predniSONE 20 MG tablet    DELTASONE    10 tablet    Take 2 tablets (40 mg) by mouth daily for 5 days    Acute bronchitis, unspecified organism       propranolol 20 MG tablet    INDERAL    90 tablet    Take 1 tablet (20 mg) by mouth 2 times daily    Gastroesophageal reflux disease without esophagitis       SODIUM BICARBONATE PO      Take 650 mg by mouth daily as needed         tiotropium 18 MCG capsule    SPIRIVA     Inhale 1 capsule into the lungs daily        traZODone 50 MG tablet    DESYREL    90 tablet    Take 1 tablet (50 mg) by mouth At Bedtime    Osteoarthritis of spine without myelopathy or radiculopathy, sacral and sacrococcygeal region

## 2018-05-07 ENCOUNTER — TELEPHONE (OUTPATIENT)
Dept: FAMILY MEDICINE | Facility: CLINIC | Age: 58
End: 2018-05-07

## 2018-05-07 DIAGNOSIS — J20.9 ACUTE BRONCHITIS, UNSPECIFIED ORGANISM: Primary | ICD-10-CM

## 2018-05-07 RX ORDER — AZITHROMYCIN 250 MG/1
TABLET, FILM COATED ORAL
Qty: 6 TABLET | Refills: 0 | Status: SHIPPED | OUTPATIENT
Start: 2018-05-07 | End: 2018-05-07

## 2018-05-07 NOTE — TELEPHONE ENCOUNTER
Pt informed.    Also, reminded pt to get labs rechecked this week.  Hgb was down to 7.8.   Pt denies feeling weak, light-headed, dizzy, chest pain, chest pressure or breathing problems.   He agrees to get labs later this week and to be seen in ED if develops symptoms as noted here.  Zulema Gramajo RN

## 2018-05-07 NOTE — TELEPHONE ENCOUNTER
Pt calling stating he was prescribed a steroid prednisone for cold symptoms about a week ago and is not seeing improvement, wondering if something else should be prescribed note talks about adding an antibiotic or if he needs to be seen again, if he has to come back in he would like to try and get his lab appt and PFT test done at the same time, I advised him to call and schedule his PFT test. please advise [phone]

## 2018-05-07 NOTE — TELEPHONE ENCOUNTER
Contacted by pharmacy re interaction between Zpak and trazodone.  Will use Augmentin instead of Zpak.  New Rx sent.  Thanks.    Chidi Valenzuela MD

## 2018-05-07 NOTE — TELEPHONE ENCOUNTER
Pt is asking for antibiotic?    Pt saw Dr Funk on 5/2/18 and the following was advised:  1. Acute bronchitis, unspecified organism  2. SOB (shortness of breath)     Abhijeet's symptoms are most consistent with viral etiology and he has diffuse wheezing on exam.    He is on many pulmonary medications (Dulera, Spiriva, and albuterol) and has asthma listed on his problem list, but he denies that he has asthma.   He thinks all of his pulm prescriptions are coming from here, but it appears that the Dulera and Spiriva have been prescribed elsewhere.  Suspect he does have either underlying asthma or COPD and has an exacerbation of this current.    I see no PFTs on file.  Will just start with a course of prednisone and consider adding antibiotic if not improving in a few days.    Continue inhalers and OTCs for symptoms.    Recommend getting PFTs when back to baseline.      - predniSONE (DELTASONE) 20 MG tablet; Take 2 tablets (40 mg) by mouth daily for 5 days  Dispense: 10 tablet; Refill: 0  - General PFT Lab (Please always keep checked); Future  - Pulmonary Function Test; Future

## 2018-05-07 NOTE — TELEPHONE ENCOUNTER
Pt updates that he is no better and cough is a little worse now.  C/o sinus headache.  He has productive cough of white-yellow sputum.  It is more difficult to sleep now due to the cough.      Routed to provider team in Dr Funk's absence this week.    Zulema Gramajo RN

## 2018-05-14 ENCOUNTER — TELEPHONE (OUTPATIENT)
Dept: PEDIATRICS | Facility: CLINIC | Age: 58
End: 2018-05-14

## 2018-05-14 NOTE — TELEPHONE ENCOUNTER
Diarrhea can be a side effect of Augmentin but could also represent infection.  Agree with clinic appointment if it continues for further evaluation.    Chidi Valenzuela MD

## 2018-05-14 NOTE — TELEPHONE ENCOUNTER
Patient called stating that he is taking an antibiotic amox due to bronchitis.  Last day is today, but has developed diarrhea a couple days ago. He believes he overdosing on antibiotic.     Karlee NAILS  [facility]

## 2018-05-14 NOTE — TELEPHONE ENCOUNTER
Patient was notified by phone. He states he was upcoming Lab appointment and agreed to be seen on same day. Scheduled with Dr. Cheung 05/17/18.      Terra RODRIGUEZ RN

## 2018-05-14 NOTE — TELEPHONE ENCOUNTER
"S-(situation): Diarrhea    B-(background): Seen Dr. Valenzuela 05/02/18. Had phone encounter regarding Augmentin 05/07/18.     A-(assessment):  Patient reports diarrhea since Friday. Patient states he went 3 times today, liquid, brown stool. Patient states he eat [employer] burgers Friday and not sure if related to that or medication. Asked if he has blood in stool and he replied \"sometimes\", did not report if it was a large amount.     Denies: dizziness, abdominal pain, fever, vomitting    R-(recommendations): Advised patient should be seen within 24 hours per Telephone Triage Protocols for Nurses 5th Edition. Explained that stool collection may be needed.  Patient states that transportation is an issue. Discussed ED precautions and home care instructions per Telephone Triage Protocols for Nurses 5th Edition     Routing to provider for review and recommendation.      Terra RODRIGUEZ RN      "

## 2018-05-17 ENCOUNTER — OFFICE VISIT (OUTPATIENT)
Dept: FAMILY MEDICINE | Facility: CLINIC | Age: 58
End: 2018-05-17
Payer: COMMERCIAL

## 2018-05-17 ENCOUNTER — HOSPITAL ENCOUNTER (OUTPATIENT)
Facility: CLINIC | Age: 58
Setting detail: OBSERVATION
Discharge: HOME | End: 2018-05-18
Attending: PHYSICIAN ASSISTANT
Payer: COMMERCIAL

## 2018-05-17 VITALS
HEART RATE: 80 BPM | BODY MASS INDEX: 30.18 KG/M2 | SYSTOLIC BLOOD PRESSURE: 103 MMHG | DIASTOLIC BLOOD PRESSURE: 49 MMHG | TEMPERATURE: 98.2 F | WEIGHT: 187 LBS | OXYGEN SATURATION: 98 %

## 2018-05-17 DIAGNOSIS — R74.8 ELEVATED LIVER ENZYMES: ICD-10-CM

## 2018-05-17 DIAGNOSIS — D64.9 SEVERE ANEMIA: ICD-10-CM

## 2018-05-17 DIAGNOSIS — D50.9 IRON DEFICIENCY ANEMIA, UNSPECIFIED IRON DEFICIENCY ANEMIA TYPE: ICD-10-CM

## 2018-05-17 DIAGNOSIS — D64.9 SYMPTOMATIC ANEMIA: ICD-10-CM

## 2018-05-17 DIAGNOSIS — D50.0 IRON DEFICIENCY ANEMIA DUE TO CHRONIC BLOOD LOSS: ICD-10-CM

## 2018-05-17 DIAGNOSIS — R53.83 FATIGUE, UNSPECIFIED TYPE: Primary | ICD-10-CM

## 2018-05-17 PROBLEM — J43.2 CENTRILOBULAR EMPHYSEMA (H): Status: ACTIVE | Noted: 2018-05-17

## 2018-05-17 PROBLEM — R53.1 GENERALIZED WEAKNESS: Status: ACTIVE | Noted: 2018-05-17

## 2018-05-17 PROBLEM — K92.2 GI BLEED: Status: RESOLVED | Noted: 2018-03-16 | Resolved: 2018-05-17

## 2018-05-17 PROBLEM — J43.2 CENTRILOBULAR EMPHYSEMA (H): Status: RESOLVED | Noted: 2018-05-17 | Resolved: 2018-05-17

## 2018-05-17 PROBLEM — D72.821 MONOCYTOSIS: Status: ACTIVE | Noted: 2018-05-17

## 2018-05-17 LAB
ALBUMIN SERPL-MCNC: 3.3 G/DL (ref 3.4–5)
ALP SERPL-CCNC: 261 U/L (ref 40–150)
ALT SERPL W P-5'-P-CCNC: 28 U/L (ref 0–70)
AST SERPL W P-5'-P-CCNC: 34 U/L (ref 0–45)
BASOPHILS # BLD AUTO: 0.1 10E9/L (ref 0–0.2)
BASOPHILS NFR BLD AUTO: 0.4 %
BILIRUB DIRECT SERPL-MCNC: 0.9 MG/DL (ref 0–0.2)
BILIRUB SERPL-MCNC: 2 MG/DL (ref 0.2–1.3)
BLD PROD TYP BPU: NORMAL
BLD PROD TYP BPU: NORMAL
BLD UNIT ID BPU: 0
BLD UNIT ID BPU: 0
BLOOD PRODUCT CODE: NORMAL
BLOOD PRODUCT CODE: NORMAL
BPU ID: NORMAL
BPU ID: NORMAL
DIFFERENTIAL METHOD BLD: ABNORMAL
EOSINOPHIL # BLD AUTO: 0.4 10E9/L (ref 0–0.7)
EOSINOPHIL NFR BLD AUTO: 2.3 %
ERYTHROCYTE [DISTWIDTH] IN BLOOD BY AUTOMATED COUNT: 19.2 % (ref 10–15)
FERRITIN SERPL-MCNC: 15 NG/ML (ref 26–388)
HCT VFR BLD AUTO: 23 % (ref 40–53)
HEMOCCULT STL QL: NORMAL
HGB BLD-MCNC: 6.8 G/DL (ref 13.3–17.7)
HGB BLD-MCNC: 7.2 G/DL (ref 13.3–17.7)
IMM GRANULOCYTES # BLD: 0.1 10E9/L (ref 0–0.4)
IMM GRANULOCYTES NFR BLD: 0.6 %
INTERNAL QC OK POCT: YES
IRON SATN MFR SERPL: 12 % (ref 15–46)
IRON SERPL-MCNC: 43 UG/DL (ref 35–180)
LDH SERPL L TO P-CCNC: 160 U/L (ref 85–227)
LYMPHOCYTES # BLD AUTO: 1.5 10E9/L (ref 0.8–5.3)
LYMPHOCYTES NFR BLD AUTO: 9.7 %
MCH RBC QN AUTO: 24.7 PG (ref 26.5–33)
MCHC RBC AUTO-ENTMCNC: 29.6 G/DL (ref 31.5–36.5)
MCV RBC AUTO: 84 FL (ref 78–100)
MONOCYTES # BLD AUTO: 2.1 10E9/L (ref 0–1.3)
MONOCYTES NFR BLD AUTO: 13.3 %
MRSA DNA SPEC QL NAA+PROBE: NEGATIVE
NEUTROPHILS # BLD AUTO: 11.3 10E9/L (ref 1.6–8.3)
NEUTROPHILS NFR BLD AUTO: 73.7 %
PLATELET # BLD AUTO: 167 10E9/L (ref 150–450)
PROT SERPL-MCNC: 7.2 G/DL (ref 6.8–8.8)
RBC # BLD AUTO: 2.75 10E12/L (ref 4.4–5.9)
SPECIMEN SOURCE: NORMAL
TEST CARD LOT NUMBER: NORMAL
TIBC SERPL-MCNC: 361 UG/DL (ref 240–430)
TRANSFUSION STATUS PATIENT QL: NORMAL
VIT B12 SERPL-MCNC: 645 PG/ML (ref 193–986)
WBC # BLD AUTO: 15.4 10E9/L (ref 4–11)

## 2018-05-17 PROCEDURE — 36415 COLL VENOUS BLD VENIPUNCTURE: CPT | Performed by: INTERNAL MEDICINE

## 2018-05-17 PROCEDURE — 82272 OCCULT BLD FECES 1-3 TESTS: CPT | Performed by: PHYSICIAN ASSISTANT

## 2018-05-17 PROCEDURE — 87641 MR-STAPH DNA AMP PROBE: CPT

## 2018-05-17 PROCEDURE — 83010 ASSAY OF HAPTOGLOBIN QUANT: CPT | Performed by: INTERNAL MEDICINE

## 2018-05-17 PROCEDURE — 36430 TRANSFUSION BLD/BLD COMPNT: CPT | Performed by: FAMILY MEDICINE

## 2018-05-17 PROCEDURE — 86850 RBC ANTIBODY SCREEN: CPT | Performed by: FAMILY MEDICINE

## 2018-05-17 PROCEDURE — P9016 RBC LEUKOCYTES REDUCED: HCPCS | Performed by: FAMILY MEDICINE

## 2018-05-17 PROCEDURE — 99285 EMERGENCY DEPT VISIT HI MDM: CPT | Mod: 25 | Performed by: FAMILY MEDICINE

## 2018-05-17 PROCEDURE — 85018 HEMOGLOBIN: CPT | Performed by: FAMILY MEDICINE

## 2018-05-17 PROCEDURE — 99214 OFFICE O/P EST MOD 30 MIN: CPT | Performed by: FAMILY MEDICINE

## 2018-05-17 PROCEDURE — 82728 ASSAY OF FERRITIN: CPT | Performed by: INTERNAL MEDICINE

## 2018-05-17 PROCEDURE — 99207 ZZC CDG-CHARGE REQUIRED MANUAL ENTRY: CPT

## 2018-05-17 PROCEDURE — 86901 BLOOD TYPING SEROLOGIC RH(D): CPT | Performed by: FAMILY MEDICINE

## 2018-05-17 PROCEDURE — 83540 ASSAY OF IRON: CPT | Performed by: INTERNAL MEDICINE

## 2018-05-17 PROCEDURE — 80076 HEPATIC FUNCTION PANEL: CPT | Performed by: FAMILY MEDICINE

## 2018-05-17 PROCEDURE — 83615 LACTATE (LD) (LDH) ENZYME: CPT | Performed by: INTERNAL MEDICINE

## 2018-05-17 PROCEDURE — G0378 HOSPITAL OBSERVATION PER HR: HCPCS

## 2018-05-17 PROCEDURE — 25000132 ZZH RX MED GY IP 250 OP 250 PS 637

## 2018-05-17 PROCEDURE — 82607 VITAMIN B-12: CPT | Performed by: INTERNAL MEDICINE

## 2018-05-17 PROCEDURE — 86900 BLOOD TYPING SEROLOGIC ABO: CPT | Performed by: FAMILY MEDICINE

## 2018-05-17 PROCEDURE — 83550 IRON BINDING TEST: CPT | Performed by: INTERNAL MEDICINE

## 2018-05-17 PROCEDURE — 87640 STAPH A DNA AMP PROBE: CPT

## 2018-05-17 PROCEDURE — 86922 COMPATIBILITY TEST ANTIGLOB: CPT | Performed by: FAMILY MEDICINE

## 2018-05-17 PROCEDURE — 99220 ZZC INITIAL OBSERVATION CARE,LEVL III: CPT

## 2018-05-17 PROCEDURE — 85025 COMPLETE CBC W/AUTO DIFF WBC: CPT | Performed by: FAMILY MEDICINE

## 2018-05-17 RX ORDER — AMOXICILLIN 250 MG
2 CAPSULE ORAL 2 TIMES DAILY
Status: DISCONTINUED | OUTPATIENT
Start: 2018-05-17 | End: 2018-05-18 | Stop reason: HOSPADM

## 2018-05-17 RX ORDER — TIOTROPIUM BROMIDE 18 UG/1
1 CAPSULE ORAL; RESPIRATORY (INHALATION) DAILY
Status: DISCONTINUED | OUTPATIENT
Start: 2018-05-17 | End: 2018-05-18 | Stop reason: HOSPADM

## 2018-05-17 RX ORDER — BISACODYL 5 MG
10 TABLET, DELAYED RELEASE (ENTERIC COATED) ORAL DAILY PRN
Status: DISCONTINUED | OUTPATIENT
Start: 2018-05-17 | End: 2018-05-18 | Stop reason: HOSPADM

## 2018-05-17 RX ORDER — ONDANSETRON 2 MG/ML
4 INJECTION INTRAMUSCULAR; INTRAVENOUS EVERY 6 HOURS PRN
Status: DISCONTINUED | OUTPATIENT
Start: 2018-05-17 | End: 2018-05-18 | Stop reason: HOSPADM

## 2018-05-17 RX ORDER — BISACODYL 5 MG
15 TABLET, DELAYED RELEASE (ENTERIC COATED) ORAL DAILY PRN
Status: DISCONTINUED | OUTPATIENT
Start: 2018-05-17 | End: 2018-05-18 | Stop reason: HOSPADM

## 2018-05-17 RX ORDER — FUROSEMIDE 20 MG
20 TABLET ORAL EVERY MORNING
Status: DISCONTINUED | OUTPATIENT
Start: 2018-05-18 | End: 2018-05-18 | Stop reason: HOSPADM

## 2018-05-17 RX ORDER — ACETAMINOPHEN 650 MG/1
650 SUPPOSITORY RECTAL EVERY 4 HOURS PRN
Status: DISCONTINUED | OUTPATIENT
Start: 2018-05-17 | End: 2018-05-18 | Stop reason: HOSPADM

## 2018-05-17 RX ORDER — TRAZODONE HYDROCHLORIDE 50 MG/1
50 TABLET, FILM COATED ORAL AT BEDTIME
Status: DISCONTINUED | OUTPATIENT
Start: 2018-05-17 | End: 2018-05-18 | Stop reason: HOSPADM

## 2018-05-17 RX ORDER — ACETAMINOPHEN 325 MG/1
650 TABLET ORAL EVERY 4 HOURS PRN
Status: DISCONTINUED | OUTPATIENT
Start: 2018-05-17 | End: 2018-05-18 | Stop reason: HOSPADM

## 2018-05-17 RX ORDER — ALBUTEROL SULFATE 90 UG/1
2 AEROSOL, METERED RESPIRATORY (INHALATION) EVERY 4 HOURS PRN
Status: DISCONTINUED | OUTPATIENT
Start: 2018-05-17 | End: 2018-05-18 | Stop reason: HOSPADM

## 2018-05-17 RX ORDER — IBUPROFEN 400 MG/1
400 TABLET, FILM COATED ORAL EVERY 6 HOURS PRN
Status: DISCONTINUED | OUTPATIENT
Start: 2018-05-17 | End: 2018-05-18 | Stop reason: HOSPADM

## 2018-05-17 RX ORDER — SODIUM BICARBONATE 650 MG/1
650 TABLET ORAL DAILY PRN
Status: DISCONTINUED | OUTPATIENT
Start: 2018-05-18 | End: 2018-05-18 | Stop reason: HOSPADM

## 2018-05-17 RX ORDER — BISACODYL 5 MG
5 TABLET, DELAYED RELEASE (ENTERIC COATED) ORAL DAILY PRN
Status: DISCONTINUED | OUTPATIENT
Start: 2018-05-17 | End: 2018-05-18 | Stop reason: HOSPADM

## 2018-05-17 RX ORDER — PROPRANOLOL HYDROCHLORIDE 20 MG/1
20 TABLET ORAL 2 TIMES DAILY
Status: DISCONTINUED | OUTPATIENT
Start: 2018-05-17 | End: 2018-05-18 | Stop reason: HOSPADM

## 2018-05-17 RX ORDER — ONDANSETRON 4 MG/1
4 TABLET, ORALLY DISINTEGRATING ORAL EVERY 6 HOURS PRN
Status: DISCONTINUED | OUTPATIENT
Start: 2018-05-17 | End: 2018-05-18 | Stop reason: HOSPADM

## 2018-05-17 RX ORDER — PANTOPRAZOLE SODIUM 40 MG/1
40 TABLET, DELAYED RELEASE ORAL
Status: DISCONTINUED | OUTPATIENT
Start: 2018-05-18 | End: 2018-05-18 | Stop reason: HOSPADM

## 2018-05-17 RX ORDER — LACTULOSE 10 G/15ML
10 SOLUTION ORAL 2 TIMES DAILY
Status: DISCONTINUED | OUTPATIENT
Start: 2018-05-17 | End: 2018-05-18 | Stop reason: HOSPADM

## 2018-05-17 RX ORDER — POTASSIUM CHLORIDE 750 MG/1
10 TABLET, EXTENDED RELEASE ORAL DAILY
Status: DISCONTINUED | OUTPATIENT
Start: 2018-05-18 | End: 2018-05-18 | Stop reason: HOSPADM

## 2018-05-17 RX ORDER — NALOXONE HYDROCHLORIDE 0.4 MG/ML
.1-.4 INJECTION, SOLUTION INTRAMUSCULAR; INTRAVENOUS; SUBCUTANEOUS
Status: DISCONTINUED | OUTPATIENT
Start: 2018-05-17 | End: 2018-05-18 | Stop reason: HOSPADM

## 2018-05-17 RX ORDER — DESONIDE 0.5 MG/ML
LOTION TOPICAL 2 TIMES DAILY PRN
Status: DISCONTINUED | OUTPATIENT
Start: 2018-05-17 | End: 2018-05-18 | Stop reason: HOSPADM

## 2018-05-17 RX ORDER — LORATADINE 10 MG/1
10 TABLET ORAL DAILY PRN
Status: DISCONTINUED | OUTPATIENT
Start: 2018-05-18 | End: 2018-05-18 | Stop reason: HOSPADM

## 2018-05-17 RX ORDER — AMOXICILLIN 250 MG
1 CAPSULE ORAL 2 TIMES DAILY
Status: DISCONTINUED | OUTPATIENT
Start: 2018-05-17 | End: 2018-05-18 | Stop reason: HOSPADM

## 2018-05-17 RX ADMIN — TRAZODONE HYDROCHLORIDE 50 MG: 50 TABLET ORAL at 23:17

## 2018-05-17 RX ADMIN — Medication 5 MG: at 23:18

## 2018-05-17 NOTE — PROGRESS NOTES
"  SUBJECTIVE:   Abhijeet Mccauley is a 57 year old male who presents to clinic today for the following health issues:      Chief Complaint   Patient presents with     Sinus Problem     2 weeks, stopped taking Augmentin because of diarrhea (diarrhea is now gone)     Laceration     cut left thumb with knife about a week ago, wants this looked at         ENT Symptoms             Symptoms: cc Present Absent Comment   Fever/Chills   x    Fatigue  x  Patient states \"i just don't feel right\".   Muscle Aches   x    Eye Irritation   x    Sneezing   x    Nasal Edward/Drg  x  Congestion    Sinus Pressure/Pain  x     Loss of smell   x    Dental pain   x    Sore Throat   x    Swollen Glands   x    Ear Pain/Fullness   x    Cough   x Not much    Wheeze   x    Chest Pain   x    Shortness of breath  x  \"hard time gasping for air when moving around\" - albuterol inhaler gives only little relief.   Rash   x    Other  x  Headaches for the last several days - bitemporal     Symptom duration:  2 weeks   Symptom severity:  moderate to severe per patient    Treatments tried:  was on Augmentin and steroids   Contacts:  none     Verified above history with patient.      Patient has been noted ot have hgb of 7.2 today - about 10 days ag it was 7.8. In amarch it was 8.6  Patient denies spontaneous bleeding.  Patient reports stools are light tan today.    Scopes done in 3/2018: diverticulosis, polyp, no bleeding      Problem list and histories reviewed & adjusted, as indicated.  Additional history: as documented    Patient Active Problem List   Diagnosis     Essential hypertension     Acute myeloid leukemia in remission (H)     Sensorineural hearing loss, asymmetrical     Alcoholic cirrhosis of liver (H)     Acute alcoholic hepatitis     Coagulation defect (H)     Osteoarthrosis     Hemorrhage of gastrointestinal tract     Thrombocytopenia (H)     Universal ulcerative (chronic) colitis(556.6) (H)     CKD (chronic kidney disease) stage 3, GFR 30-59 " ml/min     Rosacea     Mild intermittent asthma without complication     GI bleed     Peptic ulcer disease     Uncomplicated alcohol dependence (H)     Tobacco dependence syndrome     Tubular adenoma of colon     Past Surgical History:   Procedure Laterality Date     AS TOTAL KNEE ARTHROPLASTY Left      ESOPHAGOSCOPY, GASTROSCOPY, DUODENOSCOPY (EGD), COMBINED N/A 2/8/2018    Procedure: COMBINED ESOPHAGOSCOPY, GASTROSCOPY, DUODENOSCOPY (EGD), BIOPSY SINGLE OR MULTIPLE;  gastroscopy with biopsies;  Surgeon: Raz Cobb MD;  Location: [facility]     ESOPHAGOSCOPY, GASTROSCOPY, DUODENOSCOPY (EGD), COMBINED N/A 3/18/2018    Procedure: COMBINED ESOPHAGOSCOPY, GASTROSCOPY, DUODENOSCOPY (EGD);;  Surgeon: Raz Cobb MD;  Location: [facility]     LACERATION REPAIR Right     Right leg       Social History   Substance Use Topics     Smoking status: Current Every Day Smoker     Packs/day: 0.50     Years: 30.00     Types: Cigarettes     Smokeless tobacco: Never Used      Comment: declined quit plan, getting lower than 1/2 ppd     Alcohol use Yes      Comment: Down to 3 beers per day.  Sometimes a cocktail also.     Family History   Problem Relation Age of Onset     Hypertension Mother      Coronary Artery Disease Father      MI         Current Outpatient Prescriptions   Medication Sig Dispense Refill     albuterol (PROAIR HFA/PROVENTIL HFA/VENTOLIN HFA) 108 (90 BASE) MCG/ACT Inhaler Inhale 2 puffs into the lungs every 4 hours as needed 1 Inhaler 11     desonide (DESOWEN) 0.05 % lotion Apply topically as needed 118 mL 1     furosemide (LASIX) 20 MG tablet Take 1 tablet (20 mg) by mouth every morning 30 tablet 11     loratadine (CLARITIN) 10 MG tablet Take 1 tablet by mouth daily as needed for allergies. 30 tablet 6     Melatonin ER 5 MG TBCR Take 1 tablet by mouth At Bedtime 30 tablet 11     mometasone-formoterol (DULERA) 200-5 MCG/ACT oral inhaler Inhale 2 puffs into the lungs 2 times daily        PANTOPRAZOLE SODIUM PO Take 40 mg  "by mouth every morning (before breakfast)       potassium chloride SA (K-DUR/KLOR-CON M) 10 MEQ CR tablet Take 1 tablet (10 mEq) by mouth daily 90 tablet 3     propranolol (INDERAL) 20 MG tablet Take 1 tablet (20 mg) by mouth 2 times daily 90 tablet 3     SODIUM BICARBONATE PO Take 650 mg by mouth daily as needed        tiotropium (SPIRIVA) 18 MCG capsule Inhale 1 capsule into the lungs daily        traZODone (DESYREL) 50 MG tablet Take 1 tablet (50 mg) by mouth At Bedtime 90 tablet 3     acetaminophen (TYLENOL) 500 MG tablet Take 1-2 tablets by mouth every 6 hours as needed.       ibuprofen (ADVIL/MOTRIN) 400 MG tablet Take 1 tablet (400 mg) by mouth every 6 hours as needed for moderate pain 30 tablet 5     lactulose (CHRONULAC) 10 GM/15ML solution Take 10.1 mLs (6.7 g) by mouth 2 times daily 300 mL 3     Allergies   Allergen Reactions     Blood Transfusion Related (Informational Only)      Patient has a history of a clinically significant antibody against RBC antigens.  A delay in compatible RBCs may occur.     Famotidine Unknown and Other (See Comments)     Severe abdominal cramps     Allergy      Ibuprofen     Cyclobenzaprine      hives     Gabapentin      Made pt's \"face break out\"     Methocarbamol      Made pt's \"face break out\"     Motrin [Nsaids] Unknown and Hives     As reviewed in H/P of 3/2/12 Dr. Clint Plata Cramps     Severe abdominal cramps     Vancomycin      Other reaction(s): Other  hallucinations     Vfend      IV     Hydrocodone-Acetaminophen Rash       Reviewed and updated as needed this visit by clinical staff  Allergies  Meds       Reviewed and updated as needed this visit by Provider         ROS:  C: NEGATIVE for fever, chills or change in weight  I: NEGATIVE for worrisome rashes, moles or lesions  E: NEGATIVE for vision changes or irritation  ENT/MOUTH: see above  RESP:as above  CV: NEGATIVE for chest pain, palpitations or peripheral edema  GI: NEGATIVE for nausea, abdominal pain, " heartburn, or change in bowel habits  M: NEGATIVE for significant arthralgias or myalgia    OBJECTIVE:                                                    /49  Pulse 80  Temp 98.2  F (36.8  C) (Tympanic)  Wt 187 lb (84.8 kg)  SpO2 98%  BMI 30.18 kg/m2  Body mass index is 30.18 kg/(m^2).  GENERAL: alert and no distress  EYES: pale conjunctivae, no icterus  NECK: mild cervical adenopathy, nontender  HEENT: tympanic membrane intact and pearly bilaterally, nose with mild congestion, no sinus tenderness, throat mildly erythematous, no exudates, no oral ulcers  RESP: lungs clear to auscultation - no rales, no rhonchi, no wheezes  CV: regular rates and rhythm, normal S1 S2, no S3 or S4 and no murmur  SKIN:  Pale slightly icteric skin, Good turgor, no rashes    Diagnostic test results:  Diagnostic Test Results:  Results for orders placed or performed in visit on 05/17/18 (from the past 24 hour(s))   Iron and iron binding capacity   Result Value Ref Range    Iron 43 35 - 180 ug/dL    Iron Binding Cap 361 240 - 430 ug/dL    Iron Saturation Index 12 (L) 15 - 46 %   Ferritin   Result Value Ref Range    Ferritin 15 (L) 26 - 388 ng/mL   Lactate Dehydrogenase   Result Value Ref Range    Lactate Dehydrogenase 160 85 - 227 U/L   Hemoglobin   Result Value Ref Range    Hemoglobin 7.2 (L) 13.3 - 17.7 g/dL   Hepatic panel   Result Value Ref Range    Bilirubin Direct 0.9 (H) 0.0 - 0.2 mg/dL    Bilirubin Total 2.0 (H) 0.2 - 1.3 mg/dL    Albumin 3.3 (L) 3.4 - 5.0 g/dL    Protein Total 7.2 6.8 - 8.8 g/dL    Alkaline Phosphatase 261 (H) 40 - 150 U/L    ALT 28 0 - 70 U/L    AST 34 0 - 45 U/L        ASSESSMENT/PLAN:                                                        ICD-10-CM    1. Fatigue, unspecified type R53.83    2. Severe anemia D64.9        Patient was advised that his fatigue, exertional symptoms and vague feeling of illness, is more consistent with sequela of the severe anemia.  His hgb has dropped from 7.8 to 7.2 since  "last week.  Patient's PCP discussed patient's case with this provider.  We both concurred that due to symptomatic anemia, patient will be best managed in the ER for possible transfusion, possible further work up.  He had a colonscopy and EGD with no finding of bleeding 2 months ago.  Patient was advised of the above. He was initially apprehensive and declined to be sent to ER, and said he'll \"go in on Monday\" (4 days from today). Patient was advised on risks of worsening anemia particularly at his level. He then reluctantly agreed to be seen in ER.  Dr. Freeman was informed of the above and agreed to see patient in the ER.    Follow up with Provider - after ER or hospitalization.   Patient Instructions         Thank you for choosing [facility].  You may be receiving a survey in the mail from Jadon Carter regarding your visit today.  Please take a few minutes to complete and return the survey to let us know how we are doing.      If you have questions or concerns, please contact us via [email or web address] or you can contact your care team at [phone].    Our Clinic hours are:  Monday 6:40 am  to 7:00 pm  Tuesday -Friday 6:40 am to 5:00 pm    The [place] outpatient lab hours are:  Monday - Friday 6:10 am to 4:45 pm  Saturdays 7:00 am to 11:00 am  Appointments are required, call [phone]    If you have clinical questions after hours or would like to schedule an appointment,  call the clinic at [phone].        Gustavo Cheung MD  [facility]  "

## 2018-05-17 NOTE — PATIENT INSTRUCTIONS
Thank you for choosing [facility].  You may be receiving a survey in the mail from Jadon Carter regarding your visit today.  Please take a few minutes to complete and return the survey to let us know how we are doing.      If you have questions or concerns, please contact us via [employer] or you can contact your care team at [phone].    Our Clinic hours are:  Monday 6:40 am  to 7:00 pm  Tuesday -Friday 6:40 am to 5:00 pm    The [place] outpatient lab hours are:  Monday - Friday 6:10 am to 4:45 pm  Saturdays 7:00 am to 11:00 am  Appointments are required, call [phone]    If you have clinical questions after hours or would like to schedule an appointment,  call the clinic at [phone].

## 2018-05-17 NOTE — IP AVS SNAPSHOT
[facility]    [address] [address]    Phone:  [phone]    Fax:  [phone]                                       After Visit Summary   5/17/2018    Abhijeet Mccauley    MRN: [id number]           After Visit Summary Signature Page     I have received my discharge instructions, and my questions have been answered. I have discussed any challenges I see with this plan with the nurse or doctor.    ..........................................................................................................................................  Patient/Patient Representative Signature      ..........................................................................................................................................  Patient Representative Print Name and Relationship to Patient    ..................................................               ................................................  Date                                            Time    ..........................................................................................................................................  Reviewed by Signature/Title    ...................................................              ..............................................  Date                                                            Time

## 2018-05-17 NOTE — IP AVS SNAPSHOT
MRN:[id number]                      After Visit Summary   5/17/2018    Abhijeet Mccauley    MRN: [id number]           Thank you!     Thank you for choosing [facility] for your care. Our goal is always to provide you with excellent care. Hearing back from our patients is one way we can continue to improve our services. Please take a few minutes to complete the written survey that you may receive in the mail after you visit with us. Thank you!        Patient Information     Date Of Birth          1960        About your hospital stay     You were admitted on:  May 17, 2018 You last received care in the:  [facility]    You were discharged on:  May 18, 2018       Who to Call     For medical emergencies, please call 911.  For non-urgent questions about your medical care, please call your primary care provider or clinic, [phone]          Attending Provider     Provider Specialty    Elyssa Tate PA-C Physician Assistant    Kristopher Byrd MD Pulmonary    Silviano Vargas MD Internal Medicine       Primary Care Provider Office Phone # Fax #    Parrish Kristopher Funk [phone]      After Care Instructions     Activity       Your activity upon discharge: activity as tolerated            Diet       Follow this diet upon discharge: Orders Placed This Encounter      Regular Diet Adult                  Follow-up Appointments     Follow-up and recommended labs and tests        F/u oncology as scheduled                  Your next 10 appointments already scheduled     Aug 02, 2018  1:00 PM CDT   New Visit with Landen Quiñones MD   [facility] ([facility])    [address] [address] [phone]              Further instructions from your care team       Oncology will contact you to schedule a follow-up appointment. If they do not contact you within 48 hours, please call  "[phone] to schedule an appointment    Pending Results     Date and Time Order Name Status Description    2018 1212 HAPTOGLOBIN In process             Statement of Approval     Ordered          18 1003  I have reviewed and agree with all the recommendations and orders detailed in this document.  EFFECTIVE NOW     Approved and electronically signed by:  Silviano Vargas MD             Admission Information     Date & Time Provider Department Dept. Phone    2018 Silviano Vargas MD [facility] [phone]      Your Vitals Were     Blood Pressure Pulse Temperature Respirations Weight Pulse Oximetry    134/60 (BP Location: Left arm) 78 98.5  F (36.9  C) (Oral) 14 82.6 kg (182 lb 1.6 oz) 98%    BMI (Body Mass Index)                   29.39 kg/m2           MyChart Information     [facility] lets you send messages to your doctor, view your test results, renew your prescriptions, schedule appointments and more. To sign up, go to [email or web address] . Click on \"Log in\" on the left side of the screen, which will take you to the Welcome page. Then click on \"Sign up Now\" on the right side of the page.     You will be asked to enter the access code listed below, as well as some personal information. Please follow the directions to create your username and password.     Your access code is: [email or web address]  Expires: 2018 12:42 PM     Your access code will  in 90 days. If you need help or a new code, please call your [facility] clinic or [phone].        Care EveryWhere ID     This is your Care EveryWhere ID. This could be used by other organizations to access your [facility] medical records  [id number]        Equal Access to Services     [facility]: Hadii sameera Huddleston, waseanda lucandy, qaybta kaalmada song fournier. So [facility] [phone].    ATENCIÓN: Si habla español, tiene a uriostegui disposición servicios gratuitos de asistencia lingüística. " Claudy waldron [phone].    We comply with applicable federal civil rights laws and [place] laws. We do not discriminate on the basis of race, color, national origin, age, disability, sex, sexual orientation, or gender identity.               Review of your medicines      CONTINUE these medicines which have NOT CHANGED        Dose / Directions    acetaminophen 500 MG tablet   Commonly known as:  TYLENOL        Dose:  1-2 tablet   Take 1-2 tablets by mouth every 6 hours as needed.   Refills:  0       albuterol 108 (90 Base) MCG/ACT Inhaler   Commonly known as:  PROAIR HFA/PROVENTIL HFA/VENTOLIN HFA   Used for:  Mild intermittent asthma without complication        Dose:  2 puff   Inhale 2 puffs into the lungs every 4 hours as needed   Quantity:  1 Inhaler   Refills:  11       desonide 0.05 % lotion   Commonly known as:  DESOWEN   Used for:  Rosacea        Apply topically as needed   Quantity:  118 mL   Refills:  1       furosemide 20 MG tablet   Commonly known as:  LASIX   Used for:  Osteoarthritis of spine without myelopathy or radiculopathy, sacral and sacrococcygeal region        Dose:  20 mg   Take 1 tablet (20 mg) by mouth every morning   Quantity:  30 tablet   Refills:  11       ibuprofen 400 MG tablet   Commonly known as:  ADVIL/MOTRIN   Used for:  Osteoarthritis of spine without myelopathy or radiculopathy, sacral and sacrococcygeal region        Dose:  400 mg   Take 1 tablet (400 mg) by mouth every 6 hours as needed for moderate pain   Quantity:  30 tablet   Refills:  5       lactulose 10 GM/15ML solution   Commonly known as:  CHRONULAC   Used for:  Constipation, unspecified constipation type        Dose:  6.7 g   Take 10.1 mLs (6.7 g) by mouth 2 times daily   Quantity:  300 mL   Refills:  3       loratadine 10 MG tablet   Commonly known as:  CLARITIN   Used for:  Allergic rhinitis        Dose:  10 mg   Take 1 tablet by mouth daily as needed for allergies.   Quantity:  30 tablet   Refills:  6       Melatonin  ER 5 MG Tbcr   Used for:  Gastroesophageal reflux disease without esophagitis        Dose:  1 tablet   Take 1 tablet by mouth At Bedtime   Quantity:  30 tablet   Refills:  11       mometasone-formoterol 200-5 MCG/ACT oral inhaler   Commonly known as:  DULERA        Dose:  2 puff   Inhale 2 puffs into the lungs 2 times daily   Refills:  0       PANTOPRAZOLE SODIUM PO        Dose:  40 mg   Take 40 mg by mouth every morning (before breakfast)   Refills:  0       potassium chloride SA 10 MEQ CR tablet   Commonly known as:  K-DUR/KLOR-CON M   Used for:  Gastroesophageal reflux disease without esophagitis        Dose:  10 mEq   Take 1 tablet (10 mEq) by mouth daily   Quantity:  90 tablet   Refills:  3       propranolol 20 MG tablet   Commonly known as:  INDERAL   Used for:  Gastroesophageal reflux disease without esophagitis        Dose:  20 mg   Take 1 tablet (20 mg) by mouth 2 times daily   Quantity:  90 tablet   Refills:  3       SODIUM BICARBONATE PO        Dose:  650 mg   Take 650 mg by mouth daily as needed   Refills:  0       tiotropium 18 MCG capsule   Commonly known as:  SPIRIVA        Dose:  1 capsule   Inhale 1 capsule into the lungs daily   Refills:  0       traZODone 50 MG tablet   Commonly known as:  DESYREL   Used for:  Osteoarthritis of spine without myelopathy or radiculopathy, sacral and sacrococcygeal region        Dose:  50 mg   Take 1 tablet (50 mg) by mouth At Bedtime   Quantity:  90 tablet   Refills:  3                Protect others around you: Learn how to safely use, store and throw away your medicines at www.disposemymeds.org.             Medication List: This is a list of all your medications and when to take them. Check marks below indicate your daily home schedule. Keep this list as a reference.      Medications           Morning Afternoon Evening Bedtime As Needed    acetaminophen 500 MG tablet   Commonly known as:  TYLENOL   Take 1-2 tablets by mouth every 6 hours as needed.   Last time this  was given:  650 mg on 5/18/2018  9:48 AM                                   albuterol 108 (90 Base) MCG/ACT Inhaler   Commonly known as:  PROAIR HFA/PROVENTIL HFA/VENTOLIN HFA   Inhale 2 puffs into the lungs every 4 hours as needed                                   desonide 0.05 % lotion   Commonly known as:  DESOWEN   Apply topically as needed                                   furosemide 20 MG tablet   Commonly known as:  LASIX   Take 1 tablet (20 mg) by mouth every morning   Last time this was given:  20 mg on 5/18/2018  8:21 AM            Next due 5/19                       ibuprofen 400 MG tablet   Commonly known as:  ADVIL/MOTRIN   Take 1 tablet (400 mg) by mouth every 6 hours as needed for moderate pain                                   lactulose 10 GM/15ML solution   Commonly known as:  CHRONULAC   Take 10.1 mLs (6.7 g) by mouth 2 times daily                                loratadine 10 MG tablet   Commonly known as:  CLARITIN   Take 1 tablet by mouth daily as needed for allergies.                                   Melatonin ER 5 MG Tbcr   Take 1 tablet by mouth At Bedtime                                   mometasone-formoterol 200-5 MCG/ACT oral inhaler   Commonly known as:  DULERA   Inhale 2 puffs into the lungs 2 times daily                                   PANTOPRAZOLE SODIUM PO   Take 40 mg by mouth every morning (before breakfast)                                   potassium chloride SA 10 MEQ CR tablet   Commonly known as:  K-DUR/KLOR-CON M   Take 1 tablet (10 mEq) by mouth daily                                   propranolol 20 MG tablet   Commonly known as:  INDERAL   Take 1 tablet (20 mg) by mouth 2 times daily   Last time this was given:  20 mg on 5/18/2018  8:21 AM                       Due tonight                 SODIUM BICARBONATE PO   Take 650 mg by mouth daily as needed                                   tiotropium 18 MCG capsule   Commonly known as:  SPIRIVA   Inhale 1 capsule into the lungs  daily                                   traZODone 50 MG tablet   Commonly known as:  DESYREL   Take 1 tablet (50 mg) by mouth At Bedtime   Last time this was given:  50 mg on 5/17/2018 11:17 PM

## 2018-05-17 NOTE — ED NOTES
Bed: IN01  Expected date:   Expected time:   Means of arrival:   Comments:  Abhijeet Cabrera    Symptomatic anemia, hgb 7.2

## 2018-05-17 NOTE — MR AVS SNAPSHOT
After Visit Summary   5/17/2018    Abhijeet Mccauley    MRN: [id number]           Patient Information     Date Of Birth          1960        Visit Information        Provider Department      5/17/2018 1:20 PM Gustavo Cheung MD [facility]        Today's Diagnoses     Fatigue, unspecified type    -  1    Severe anemia          Care Instructions          Thank you for choosing [facility].  You may be receiving a survey in the mail from [facility] regarding your visit today.  Please take a few minutes to complete and return the survey to let us know how we are doing.      If you have questions or concerns, please contact us via Bouju or you can contact your care team at [phone].    Our Clinic hours are:  Monday 6:40 am  to 7:00 pm  Tuesday -Friday 6:40 am to 5:00 pm    The [place] outpatient lab hours are:  Monday - Friday 6:10 am to 4:45 pm  Saturdays 7:00 am to 11:00 am  Appointments are required, call [phone]    If you have clinical questions after hours or would like to schedule an appointment,  call the clinic at [phone].            Follow-ups after your visit        Follow-up notes from your care team     Return if symptoms worsen or fail to improve.      Your next 10 appointments already scheduled     Aug 02, 2018  1:00 PM CDT   New Visit with Landen Quiñones MD   [facility] ([facility] [place])    [address]  [address]  [place] [facility] [place] [id number] [phone]              Future tests that were ordered for you today     Open Standing Orders        Priority Remaining Interval Expires Ordered    Transfuse red blood cell unit STAT 1/2 TRANSFUSE 2 UNITS  5/17/2018            Who to contact     If you have questions or need follow up information about today's clinic visit or your schedule please contact [facility] directly at [phone].  Normal or non-critical lab and  "imaging results will be communicated to you by MyChart, letter or phone within 4 business days after the clinic has received the results. If you do not hear from us within 7 days, please contact the clinic through [employer]al[employer] or phone. If you have a critical or abnormal lab result, we will notify you by phone as soon as possible.  Submit refill requests through [employer] or call your pharmacy and they will forward the refill request to us. Please allow 3 business days for your refill to be completed.          Additional Information About Your Visit        [employer] Now[employer] Information     [employer] lets you send messages to your doctor, view your test results, renew your prescriptions, schedule appointments and more. To sign up, go to [email or web address] . Click on \"Log in\" on the left side of the screen, which will take you to the Welcome page. Then click on \"Sign up Now\" on the right side of the page.     You will be asked to enter the access code listed below, as well as some personal information. Please follow the directions to create your username and password.     Your access code is: [email or web address]  Expires: 2018 12:42 PM     Your access code will  in 90 days. If you need help or a new code, please call your [place] clinic or [phone].        Care EveryWhere ID     This is your Care EveryWhere ID. This could be used by other organizations to access your [place] medical records  [id number]        Your Vitals Were     Pulse Temperature Pulse Oximetry BMI (Body Mass Index)          80 98.2  F (36.8  C) (Tympanic) 98% 30.18 kg/m2         Blood Pressure from Last 3 Encounters:   18 110/67   18 103/49   18 130/56    Weight from Last 3 Encounters:   18 186 lb (84.4 kg)   18 187 lb (84.8 kg)   18 184 lb 9.6 oz (83.7 kg)              Today, you had the following     No orders found for display       Primary Care Provider Office Phone # Fax #    Parrish Funk [phone] " [phone]       [address] [address]        Equal Access to Services     JORGE COOL : Hadii aad ku hadmitchelliborio Soemily, waaxda luqadaha, qaybta kaalmada elianling, waxbill diane esemichael plummerdelroy coatesalejandra ureña. So [facility] [place] [phone].    ATENCIÓN: Si habla español, tiene a uriostegui disposición servicios gratuitos de asistencia lingüística. Llame al [phone].    We comply with applicable federal civil rights laws and [place] laws. We do not discriminate on the basis of race, color, national origin, age, disability, sex, sexual orientation, or gender identity.            Thank you!     Thank you for choosing [facility]  for your care. Our goal is always to provide you with excellent care. Hearing back from our patients is one way we can continue to improve our services. Please take a few minutes to complete the written survey that you may receive in the mail after your visit with us. Thank you!             Your Updated Medication List - Protect others around you: Learn how to safely use, store and throw away your medicines at www.disposemymeds.org.          This list is accurate as of 5/17/18  4:54 PM.  Always use your most recent med list.                   Brand Name Dispense Instructions for use Diagnosis    acetaminophen 500 MG tablet    TYLENOL     Take 1-2 tablets by mouth every 6 hours as needed.        albuterol 108 (90 Base) MCG/ACT Inhaler    PROAIR HFA/PROVENTIL HFA/VENTOLIN HFA    1 Inhaler    Inhale 2 puffs into the lungs every 4 hours as needed    Mild intermittent asthma without complication       desonide 0.05 % lotion    DESOWEN    118 mL    Apply topically as needed    Rosacea       furosemide 20 MG tablet    LASIX    30 tablet    Take 1 tablet (20 mg) by mouth every morning    Osteoarthritis of spine without myelopathy or radiculopathy, sacral and sacrococcygeal region       ibuprofen 400 MG tablet    ADVIL/MOTRIN    30 tablet    Take 1 tablet (400 mg) by mouth every 6  hours as needed for moderate pain    Osteoarthritis of spine without myelopathy or radiculopathy, sacral and sacrococcygeal region       lactulose 10 GM/15ML solution    CHRONULAC    300 mL    Take 10.1 mLs (6.7 g) by mouth 2 times daily    Constipation, unspecified constipation type       loratadine 10 MG tablet    CLARITIN    30 tablet    Take 1 tablet by mouth daily as needed for allergies.    Allergic rhinitis       Melatonin ER 5 MG Tbcr     30 tablet    Take 1 tablet by mouth At Bedtime    Gastroesophageal reflux disease without esophagitis       mometasone-formoterol 200-5 MCG/ACT oral inhaler    DULERA     Inhale 2 puffs into the lungs 2 times daily        PANTOPRAZOLE SODIUM PO      Take 40 mg by mouth every morning (before breakfast)        potassium chloride SA 10 MEQ CR tablet    K-DUR/KLOR-CON M    90 tablet    Take 1 tablet (10 mEq) by mouth daily    Gastroesophageal reflux disease without esophagitis       propranolol 20 MG tablet    INDERAL    90 tablet    Take 1 tablet (20 mg) by mouth 2 times daily    Gastroesophageal reflux disease without esophagitis       SODIUM BICARBONATE PO      Take 650 mg by mouth daily as needed        tiotropium 18 MCG capsule    SPIRIVA     Inhale 1 capsule into the lungs daily        traZODone 50 MG tablet    DESYREL    90 tablet    Take 1 tablet (50 mg) by mouth At Bedtime    Osteoarthritis of spine without myelopathy or radiculopathy, sacral and sacrococcygeal region

## 2018-05-17 NOTE — ED PROVIDER NOTES
History     Chief Complaint   Patient presents with     Abnormal Labs     sent to ED from clinic for low hgb     HPI  Abhijeet Mccauley is a 57 year old male with past medical history significant for hypertension, alcoholic cirrhosis, osteoarthritis, ulcerative colitis, CKD, hx of  gastrointestinal hemorrhage and acute myelogenous leukemia in remission per [facility] records  presents to the emergency department at recommendation of his primary care provider for concerns of her symptomatic anemia.  Patient was evaluated in the clinic earlier today for one week history of nasal congestion, sinus pressure, cough and concern over old laceration to his left thumb.  He additionally mentioned that he was feeling fatigued and has shortness of breath on exertion.  He denies any dizziness, lightheadedness, chest pain, palpitations, nausea, vomiting, diarrhea  He denies any blood in his stool at this time.  No hematuria, gum bleeding, unexplained bruising at this time.  Patient was admitted at this facility on 3/16/18 for similar anemia secondary to rectal bleeding at that time.   He did have endoscopy, colonoscopy on 3/18/18 which did not show obvious source of bleeding.     Problem List:    Patient Active Problem List    Diagnosis Date Noted     Tubular adenoma of colon 03/23/2018     Priority: Medium     Collected: 3/18/2018   Received: 3/19/2018   Reported: 3/22/2018 19:19   Ordering Phy(s): SASKIA LEE     For improved result formatting, select 'View Enhanced Report Format' under    Linked Documents section.     SPECIMEN(S):   A: Splenic flexure polyp   B: Rectal polyp     FINAL DIAGNOSIS:   A.  Colon, splenic flexure, mucosal biopsies:   - Tubular adenoma.   - Negative for high-grade dysplasia and malignancy.     B.  Rectum, mucosal biopsy:   - Tubular adenoma.   - Negative for high-grade dysplasia and malignancy.        GI bleed 03/16/2018     Priority: Medium     Peptic ulcer disease 03/16/2018     Priority: Medium      Uncomplicated alcohol dependence (H) 03/16/2018     Priority: Medium     Tobacco dependence syndrome 03/16/2018     Priority: Medium     Mild intermittent asthma without complication 11/13/2017     Priority: Medium     CKD (chronic kidney disease) stage 3, GFR 30-59 ml/min 08/16/2017     Priority: Medium     Rosacea 08/16/2017     Priority: Medium     Sensorineural hearing loss, asymmetrical 03/28/2017     Priority: Medium     Thrombocytopenia (H) 11/11/2014     Priority: Medium     Universal ulcerative (chronic) colitis(556.6) (H) 11/11/2014     Priority: Medium     Alcoholic cirrhosis of liver (H) 11/04/2014     Priority: Medium     Coagulation defect (H) 11/04/2014     Priority: Medium     Acute alcoholic hepatitis 10/22/2014     Priority: Medium     Hemorrhage of gastrointestinal tract 10/22/2014     Priority: Medium     Osteoarthrosis 02/21/2014     Priority: Medium     Essential hypertension 03/28/2011     Priority: Medium     Problem list name updated by automated process. Provider to review       Acute myeloid leukemia in remission (H) 03/28/2011     Priority: Medium     S/p chemo, did not need bone marrow transplant        Past Medical History:    Past Medical History:   Diagnosis Date     Alcohol dependence (H)      Alcoholic cirrhosis of liver (H)      AML (acute myeloid leukemia) in remission (H)      COPD (chronic obstructive pulmonary disease) (H)      History of pulmonary embolus (PE)      Hypertension      PUD (peptic ulcer disease)      Tobacco dependence      Ulcerative colitis (H)      Past Surgical History:    Past Surgical History:   Procedure Laterality Date     AS TOTAL KNEE ARTHROPLASTY Left      ESOPHAGOSCOPY, GASTROSCOPY, DUODENOSCOPY (EGD), COMBINED N/A 2/8/2018    Procedure: COMBINED ESOPHAGOSCOPY, GASTROSCOPY, DUODENOSCOPY (EGD), BIOPSY SINGLE OR MULTIPLE;  gastroscopy with biopsies;  Surgeon: Raz Cobb MD;  Location: [facility]     ESOPHAGOSCOPY, GASTROSCOPY, DUODENOSCOPY (EGD),  COMBINED N/A 3/18/2018    Procedure: COMBINED ESOPHAGOSCOPY, GASTROSCOPY, DUODENOSCOPY (EGD);;  Surgeon: Raz Cobb MD;  Location: [facility]     LACERATION REPAIR Right     Right leg     Family History:    Family History   Problem Relation Age of Onset     Hypertension Mother      Coronary Artery Disease Father      MI     Social History:  Marital Status:  Single [1]  Social History   Substance Use Topics     Smoking status: Current Every Day Smoker     Packs/day: 0.50     Years: 30.00     Types: Cigarettes     Smokeless tobacco: Never Used      Comment: declined quit plan, getting lower than 1/2 ppd     Alcohol use Yes      Comment: Down to 3 beers per day.  Sometimes a cocktail also.      Medications:      acetaminophen (TYLENOL) 500 MG tablet   albuterol (PROAIR HFA/PROVENTIL HFA/VENTOLIN HFA) 108 (90 BASE) MCG/ACT Inhaler   desonide (DESOWEN) 0.05 % lotion   furosemide (LASIX) 20 MG tablet   ibuprofen (ADVIL/MOTRIN) 400 MG tablet   lactulose (CHRONULAC) 10 GM/15ML solution   loratadine (CLARITIN) 10 MG tablet   Melatonin ER 5 MG TBCR   mometasone-formoterol (DULERA) 200-5 MCG/ACT oral inhaler   PANTOPRAZOLE SODIUM PO   potassium chloride SA (K-DUR/KLOR-CON M) 10 MEQ CR tablet   propranolol (INDERAL) 20 MG tablet   SODIUM BICARBONATE PO   tiotropium (SPIRIVA) 18 MCG capsule   traZODone (DESYREL) 50 MG tablet     Review of Systems   Constitutional: Positive for fatigue. Negative for appetite change, chills and fever.   HENT: Positive for congestion.    Eyes: Negative for photophobia, pain, discharge and visual disturbance.   Respiratory: Positive for shortness of breath (on exertion). Negative for cough.    Cardiovascular: Negative for chest pain.   Gastrointestinal: Negative for abdominal distention, abdominal pain, blood in stool, diarrhea, nausea and vomiting.   Genitourinary: Negative for hematuria.   Skin: Positive for wound (left thumb). Negative for color change, pallor and rash.   Neurological: Negative  for dizziness, syncope, light-headedness and headaches.   All other systems reviewed and are negative.     Physical Exam   BP: 110/67  Pulse: 81  Temp: 97.3  F (36.3  C)  Resp: 16  Weight: 84.4 kg (186 lb)  SpO2: 98 %\  Physical Exam  GENERAL APPEARANCE: pale appearing, alert, no acute distress   HENT: ear canals and TM's normal.  Nose and mouth without ulcers, erythema or lesions  NECK: supple, nontender, no lymphadenopathy  RESP: lungs clear to auscultation - no rales, rhonchi or wheezes  CV: regular rates and rhythm, normal S1 S2, no murmur noted  ABDOMEN:  Obese, non-tender, no HSM or masses and bowel sounds normal  SKIN: no suspicious lesions or rashes.  There is 1 cm superficial near avulsion of skin of distal pad of left thumb, wound is well approximated, no active bleeding at this time with some blanching of the flap portion of laceration   ED Course     ED Course     Procedures        Critical Care time:  none        No results found for this or any previous visit (from the past 24 hour(s)).    Results for orders placed or performed during the hospital encounter of 05/17/18   CBC with platelets differential   Result Value Ref Range    WBC 15.4 (H) 4.0 - 11.0 10e9/L    RBC Count 2.75 (L) 4.4 - 5.9 10e12/L    Hemoglobin 6.8 (LL) 13.3 - 17.7 g/dL    Hematocrit 23.0 (L) 40.0 - 53.0 %    MCV 84 78 - 100 fl    MCH 24.7 (L) 26.5 - 33.0 pg    MCHC 29.6 (L) 31.5 - 36.5 g/dL    RDW 19.2 (H) 10.0 - 15.0 %    Platelet Count 167 150 - 450 10e9/L    Diff Method Automated Method     % Neutrophils 73.7 %    % Lymphocytes 9.7 %    % Monocytes 13.3 %    % Eosinophils 2.3 %    % Basophils 0.4 %    % Immature Granulocytes 0.6 %    Absolute Neutrophil 11.3 (H) 1.6 - 8.3 10e9/L    Absolute Lymphocytes 1.5 0.8 - 5.3 10e9/L    Absolute Monocytes 2.1 (H) 0.0 - 1.3 10e9/L    Absolute Eosinophils 0.4 0.0 - 0.7 10e9/L    Absolute Basophils 0.1 0.0 - 0.2 10e9/L    Abs Immature Granulocytes 0.1 0 - 0.4 10e9/L   Occult blood stool POCT    Result Value Ref Range    Occult Blood neg neg    Internal QC OK Yes     Test Card Lot Number [id number]    ABO/Rh type and screen   Result Value Ref Range    Units Ordered 2     ABO O     RH(D) Pos     Antibody Screen Neg     Test Valid Only At [facility]        Specimen Expires 05/20/2018     Crossmatch Red Blood Cells    Blood component   Result Value Ref Range    Unit Number [id number]     Blood Component Type Red Blood Cells Leukocyte Reduced     Division Number 00     Status of Unit Released to care unit     Blood Product Code T3871I72     Unit Status ISS    Blood component   Result Value Ref Range    Unit Number [id number]     Blood Component Type Red Blood Cells Leukocyte Reduced     Division Number 00     Status of Unit Released to care unit     Blood Product Code W6981H85     Unit Status ISS    Methicillin Resistant Staph Aureus PCR   Result Value Ref Range    Specimen Description Nares     Methicillin Resist/Sens S. aureus PCR Negative NEG^Negative     Medications   acetaminophen (TYLENOL) tablet 650 mg (650 mg Oral Given 5/18/18 0948)   acetaminophen (TYLENOL) Suppository 650 mg (not administered)   naloxone (NARCAN) injection 0.1-0.4 mg (not administered)   melatonin tablet 1 mg (not administered)   ondansetron (ZOFRAN-ODT) ODT tab 4 mg (not administered)     Or   ondansetron (ZOFRAN) injection 4 mg (not administered)   senna-docusate (SENOKOT-S;PERICOLACE) 8.6-50 MG per tablet 1 tablet (1 tablet Oral Not Given 5/18/18 0813)     Or   senna-docusate (SENOKOT-S;PERICOLACE) 8.6-50 MG per tablet 2 tablet ( Oral See Alternative 5/18/18 0813)   bisacodyl (DULCOLAX) EC tablet 5 mg (not administered)     Or   bisacodyl (DULCOLAX) EC tablet 10 mg (not administered)     Or   bisacodyl (DULCOLAX) EC tablet 15 mg (not administered)   albuterol (PROAIR HFA/PROVENTIL HFA/VENTOLIN HFA) Inhaler 2 puff (not administered)   desonide (DESOWEN) 0.05 % lotion (not administered)   furosemide (LASIX) tablet  20 mg (20 mg Oral Given 5/18/18 0821)   ibuprofen (ADVIL/MOTRIN) tablet 400 mg (not administered)   lactulose (CHRONULAC) solution 10 mL (10 mLs Oral Not Given 5/18/18 0812)   loratadine (CLARITIN) tablet 10 mg (not administered)   melatonin tablet 5 mg (5 mg Oral Given 5/17/18 2318)   mometasone-formoterol (DULERA) 200-5 MCG/ACT oral inhaler 2 puff (2 puffs Inhalation Not Given 5/18/18 0813)   pantoprazole (PROTONIX) EC tablet 40 mg (40 mg Oral Not Given 5/18/18 0656)   potassium chloride SA (K-DUR/KLOR-CON M) CR tablet 10 mEq (10 mEq Oral Not Given 5/18/18 0813)   propranolol (INDERAL) tablet 20 mg (20 mg Oral Given 5/18/18 0821)   sodium bicarbonate tablet 650 mg (not administered)   tiotropium (SPIRIVA) capsule 18 mcg (18 mcg Inhalation Not Given 5/18/18 0813)   traZODone (DESYREL) tablet 50 mg (50 mg Oral Given 5/17/18 2317)     Assessments & Plan (with Medical Decision Making)     I have reviewed the nursing notes.    I have reviewed the findings, diagnosis, plan and need for follow up with the patient.    ED to Inpatient Handoff:    Discussed with Kristopher Byrd at 17:30  Patient accepted for Observation Stay  Pending studies include none  Code Status: Full Code       Current Discharge Medication List        Final diagnoses:   Symptomatic anemia     57-year-old male presents to the emergency department request of clinic after he was noted to be anemic in clinic earlier today while beiing evaluated for one week history of URI symptoms and concern over laceration to his left thumb.  He had stable vital signs upon arrival.  Physical exam findings were significant for pale appearing individual with superficial old appearing  laceration to distal left thumb.  As part of evaluation he did have laboratory testing including CBC which confirmed anemia with hemoglobin of 6.8, white count was elevated at 15.4 platelets are within normal limits.  Guaiac testing was negative at this time, does not appear to be having GI  bleeding to explain anemia, additional laboratory evaluation from clinic visit was reviewed as well.  After discussing versus benefits patient agreed to have a fusion of packed red blood cells at this time.  Due to extended time that this will take, I did discuss case with Dr. Kristopher Byrd on-call hospitalist who agreed to accept patient for observation, recheck hemoglobin tomorrow.  Given elevated white blood cell and history of leukemia would also consider referral to hematology/oncology.   Patient's case was also discussed with ER physician Dr. Dillon Gilliland who did independently examined the patient and states agreement with above outlined workup assessment and plan.    Disclaimer: This note consists of symbols derived from keyboarding, dictation, and/or voice recognition software. As a result, there may be errors in the script that have gone undetected.  Please consider this when interpreting information found in the chart.    5/17/2018   [facility] EMERGENCY DEPARTMENT     Elyssa Tate PA-C  05/26/18 0949

## 2018-05-18 VITALS
BODY MASS INDEX: 29.39 KG/M2 | SYSTOLIC BLOOD PRESSURE: 134 MMHG | TEMPERATURE: 98.5 F | OXYGEN SATURATION: 98 % | RESPIRATION RATE: 14 BRPM | HEART RATE: 78 BPM | WEIGHT: 182.1 LBS | DIASTOLIC BLOOD PRESSURE: 60 MMHG

## 2018-05-18 LAB
ANION GAP SERPL CALCULATED.3IONS-SCNC: 8 MMOL/L (ref 3–14)
BUN SERPL-MCNC: 15 MG/DL (ref 7–30)
CALCIUM SERPL-MCNC: 8.2 MG/DL (ref 8.5–10.1)
CHLORIDE SERPL-SCNC: 113 MMOL/L (ref 94–109)
CO2 SERPL-SCNC: 21 MMOL/L (ref 20–32)
CREAT SERPL-MCNC: 1.72 MG/DL (ref 0.66–1.25)
ERYTHROCYTE [DISTWIDTH] IN BLOOD BY AUTOMATED COUNT: 18.1 % (ref 10–15)
GFR SERPL CREATININE-BSD FRML MDRD: 41 ML/MIN/1.7M2
GLUCOSE SERPL-MCNC: 84 MG/DL (ref 70–99)
HAPTOGLOB SERPL-MCNC: 83 MG/DL (ref 15–200)
HCT VFR BLD AUTO: 25.6 % (ref 40–53)
HGB BLD-MCNC: 8 G/DL (ref 13.3–17.7)
MCH RBC QN AUTO: 26.1 PG (ref 26.5–33)
MCHC RBC AUTO-ENTMCNC: 31.3 G/DL (ref 31.5–36.5)
MCV RBC AUTO: 83 FL (ref 78–100)
PLATELET # BLD AUTO: 129 10E9/L (ref 150–450)
POTASSIUM SERPL-SCNC: 3.4 MMOL/L (ref 3.4–5.3)
RBC # BLD AUTO: 3.07 10E12/L (ref 4.4–5.9)
SODIUM SERPL-SCNC: 142 MMOL/L (ref 133–144)
WBC # BLD AUTO: 10.8 10E9/L (ref 4–11)

## 2018-05-18 PROCEDURE — 25000132 ZZH RX MED GY IP 250 OP 250 PS 637

## 2018-05-18 PROCEDURE — 99217 ZZC OBSERVATION CARE DISCHARGE: CPT | Performed by: INTERNAL MEDICINE

## 2018-05-18 PROCEDURE — 85027 COMPLETE CBC AUTOMATED: CPT

## 2018-05-18 PROCEDURE — 36415 COLL VENOUS BLD VENIPUNCTURE: CPT

## 2018-05-18 PROCEDURE — 80048 BASIC METABOLIC PNL TOTAL CA: CPT

## 2018-05-18 PROCEDURE — 99207 ZZC CDG-CODE CATEGORY CHANGED: CPT | Performed by: INTERNAL MEDICINE

## 2018-05-18 PROCEDURE — G0378 HOSPITAL OBSERVATION PER HR: HCPCS

## 2018-05-18 RX ADMIN — ACETAMINOPHEN 650 MG: 325 TABLET, FILM COATED ORAL at 09:48

## 2018-05-18 RX ADMIN — FUROSEMIDE 20 MG: 20 TABLET ORAL at 08:21

## 2018-05-18 RX ADMIN — PROPRANOLOL HYDROCHLORIDE 20 MG: 20 TABLET ORAL at 08:21

## 2018-05-18 ASSESSMENT — ENCOUNTER SYMPTOMS
ABDOMINAL PAIN: 0
CHILLS: 0
COLOR CHANGE: 0
LIGHT-HEADEDNESS: 0
WOUND: 1
COUGH: 0
FATIGUE: 1
HEADACHES: 0
DIARRHEA: 0
FEVER: 0
APPETITE CHANGE: 0
NAUSEA: 0
DIZZINESS: 0
SHORTNESS OF BREATH: 1
VOMITING: 0

## 2018-05-18 NOTE — PLAN OF CARE
"Problem: Patient Care Overview  Goal: Plan of Care/Patient Progress Review  Pt will have blood transfusion without difficulty.  2 units blood transfused, no adverse reactions. Pt has been up independent on room , to BR. He states he cannot urinate in the urinal but has been voiding without difficulty. He also states that he did have a bowel movement and it was\"normal\". Denies any bleeding or blood.pt not complaining of pain.      "

## 2018-05-18 NOTE — PROGRESS NOTES
Pt had a BM this morning and verbalized a small amount of red streaks in his stool, states he has a history of hemorrhoids. Pt flushed stool prior to staff visualizing. Hat placed in toilet and will notify MD of pt findings.

## 2018-05-18 NOTE — DISCHARGE SUMMARY
[facility] Discharge Summary    Abhijeet Mccauley MRN# [id number]   Age: 57 year old YOB: 1960     Date of Admission:  5/17/2018  Date of Discharge::  5/18/2018  Admitting Physician:  Kristopher Byrd MD  Discharge Physician:  Silviano Vargas MD  Primary Physician: Parrish Funk  Transferring Facility: N/A     Home clinic: [facility]          Admission Diagnoses:   Symptomatic anemia [D64.9]          Discharge Diagnosis:   Principle diagnosis: Normocytic anemia -symptomatic  Secondary diagnoses:  Patient Active Problem List   Diagnosis     Essential hypertension     Acute myeloid leukemia in remission (H)     Sensorineural hearing loss, asymmetrical     Alcoholic cirrhosis of liver (H)     Acute alcoholic hepatitis     Coagulation defect (H)     Osteoarthrosis     Thrombocytopenia (H)     Universal ulcerative (chronic) colitis(556.6) (H)     CKD (chronic kidney disease) stage 3, GFR 30-59 ml/min     Rosacea     Mild intermittent asthma without complication     Peptic ulcer disease     Uncomplicated alcohol dependence (H)     Tobacco dependence syndrome     Tubular adenoma of colon     Normocytic anemia     Leukocytosis with increased monocytes     Generalized weakness          Brief History of Presenting Illness:   As per admit hx  Abhijeet Mccauley is a 57 year old male with the above past medical history now presents on 5/17/2018 with generalized weakness and a Hgb of 6.8 from clinic labs.     Mr. Mccauley was hospitalized 3/16 through 3/18/18 here at [facility] for anemia and presumed GI hemorrhage.  Dr. Tom, surgery, was consulted.  Dr. Tom performed upper endoscopy on 3/16/2018 which did not demonstrate active bleeding.  Colonoscopy performed 3/16/2018 did not show active bleeding.  The patient was transfused, then discharged home.     Mr. Culp and has had a few clinic visits since that March 2018 hospital admission which involved labs.  His  "hemoglobin has slowly declined since that hospitalization and transfusion, from 7.8, 2 7.2, and today to 6.8, on labs done at a clinic visit.  When that hemoglobin was noted, the patient was advised to come to the [facility] emergency department for evaluation and probable admission.     The patient's only complaint is that of generalized weakness.  On further questioning, he does not necessarily feel weak, he \"just does not feel right\".  He has had no emesis or hematemesis.  He said that his bowel movements have been tan, without melena or hematochezia.  He has had no abdominal pain.  He denies any unusual bruising, or excess bleeding after cuts, such as a cut he sustained on his left thumb recently.     Looking back through old records, I found that the patient also underwent upper endoscopy on 2/8/2018.  No active bleeding or a source of bleeding was seen on that exam either.    No results found for this or any previous visit (from the past 24 hour(s)).         Hospital Course:   Normocytic anemia -   Progressive problem characterized by slow decline in Hgb over weeks.  Was hospitalized 3/16-3/18, transfused, so Hgb was 8.5 on 3/28/18.  Since then, Hgb has gone to 7.8, 7.2, then 6.8 today, prompting admission.  GI loss has been postulated, but no active bleeding was seen on 2/8/18 EGD, and no source of bleeding was found on EGD and colonoscopy 3/16/18.  5/17, stool guaiac is negative.  Fe deficiency was postulated, and the patient was started on PO iron in 3/2018, which he took for only one dose then stopped it due to GI upset, but Fe is within normal range at 43 even off of FeSO4.  Presented now with hg 6.8--weakness. S/p 1 of 1 PRBC 5/17--hg 8.0 now.  Its a possibility that patient's hematologic malignancy, AML, has recurred or if he has other marrow disease.  Along with anemia, he has a persistent leukocytosis, though predominantly monocytes, admission WBC 15.4, which seems to be trending up over past two " "months.  Platelets are normal.  -will d/c home to f/u oncology at [place].       Essential hypertension   - good control on current Rx.  - Continue propranolol, furosemide.       Acute myeloid leukemia in remission (H)   - Portion of most recent Hematology evaluation in [facility], 12/10/14, Dr. Caro:     He was diagnosed with AML in June of 2008 who underwent induction chemotherapy with 7 + 3 and a second cycle of induction of 5 + 2 with remission and high dose KELLIE-C x 4, completed in December 2008. His complications included a line associated DVT developing into a pulmonary embolus in December 2008 for which he was anticoagulated for six months. He was last seen in clinic on January 26, 2012 as he was considered essentially cured of disease.     - Given unexplained, recurrent anemia, as well as persistent and perhaps progressive leukocytosis, -- Hematology re-evaluation is warranted.       CKD (chronic kidney disease) stage 3, GFR 30-59 ml/min -  -stable       Mild intermittent asthma without complication   - Asymptomatic most days.    - Resume home inhaler regimen.              Procedures:   No procedures performed during this admission         Allergies:      Allergies   Allergen Reactions     Blood Transfusion Related (Informational Only)      Patient has a history of a clinically significant antibody against RBC antigens.  A delay in compatible RBCs may occur.     Famotidine Unknown and Other (See Comments)     Severe abdominal cramps     Allergy      Ibuprofen     Cyclobenzaprine      hives     Gabapentin      Made pt's \"face break out\"     Methocarbamol      Made pt's \"face break out\"     Motrin [Nsaids] Unknown and Hives     As reviewed in H/P of 3/2/12 Dr. Clint Plata Cramps     Severe abdominal cramps     Vancomycin      Other reaction(s): Other  hallucinations     Vfend      IV     Hydrocodone-Acetaminophen Rash             Medications Prior to Admission:     Prescriptions Prior to Admission "   Medication Sig Dispense Refill Last Dose     acetaminophen (TYLENOL) 500 MG tablet Take 1-2 tablets by mouth every 6 hours as needed.   5/17/2018     albuterol (PROAIR HFA/PROVENTIL HFA/VENTOLIN HFA) 108 (90 BASE) MCG/ACT Inhaler Inhale 2 puffs into the lungs every 4 hours as needed 1 Inhaler 11 5/16/2018 at hs     furosemide (LASIX) 20 MG tablet Take 1 tablet (20 mg) by mouth every morning 30 tablet 11 5/17/2018 at am     ibuprofen (ADVIL/MOTRIN) 400 MG tablet Take 1 tablet (400 mg) by mouth every 6 hours as needed for moderate pain 30 tablet 5 5/16/2018 at pm     lactulose (CHRONULAC) 10 GM/15ML solution Take 10.1 mLs (6.7 g) by mouth 2 times daily 300 mL 3 Past Month     loratadine (CLARITIN) 10 MG tablet Take 1 tablet by mouth daily as needed for allergies. 30 tablet 6 5/17/2018 at am     Melatonin ER 5 MG TBCR Take 1 tablet by mouth At Bedtime 30 tablet 11 5/16/2018 at hs     mometasone-formoterol (DULERA) 200-5 MCG/ACT oral inhaler Inhale 2 puffs into the lungs 2 times daily    5/16/2018 at pm     PANTOPRAZOLE SODIUM PO Take 40 mg by mouth every morning (before breakfast)   5/17/2018 at am     potassium chloride SA (K-DUR/KLOR-CON M) 10 MEQ CR tablet Take 1 tablet (10 mEq) by mouth daily 90 tablet 3 5/17/2018 at am     propranolol (INDERAL) 20 MG tablet Take 1 tablet (20 mg) by mouth 2 times daily 90 tablet 3 5/17/2018 at am     SODIUM BICARBONATE PO Take 650 mg by mouth daily as needed    5/17/2018     tiotropium (SPIRIVA) 18 MCG capsule Inhale 1 capsule into the lungs daily    Past Week at Unknown time     traZODone (DESYREL) 50 MG tablet Take 1 tablet (50 mg) by mouth At Bedtime 90 tablet 3 5/16/2018 at hs     desonide (DESOWEN) 0.05 % lotion Apply topically as needed 118 mL 1 More than a month at Unknown time             Discharge Medications:     Current Discharge Medication List      CONTINUE these medications which have NOT CHANGED    Details   acetaminophen (TYLENOL) 500 MG tablet Take 1-2 tablets by  mouth every 6 hours as needed.      albuterol (PROAIR HFA/PROVENTIL HFA/VENTOLIN HFA) 108 (90 BASE) MCG/ACT Inhaler Inhale 2 puffs into the lungs every 4 hours as needed  Qty: 1 Inhaler, Refills: 11    Associated Diagnoses: Mild intermittent asthma without complication      furosemide (LASIX) 20 MG tablet Take 1 tablet (20 mg) by mouth every morning  Qty: 30 tablet, Refills: 11    Associated Diagnoses: Osteoarthritis of spine without myelopathy or radiculopathy, sacral and sacrococcygeal region      ibuprofen (ADVIL/MOTRIN) 400 MG tablet Take 1 tablet (400 mg) by mouth every 6 hours as needed for moderate pain  Qty: 30 tablet, Refills: 5    Associated Diagnoses: Osteoarthritis of spine without myelopathy or radiculopathy, sacral and sacrococcygeal region      lactulose (CHRONULAC) 10 GM/15ML solution Take 10.1 mLs (6.7 g) by mouth 2 times daily  Qty: 300 mL, Refills: 3    Associated Diagnoses: Constipation, unspecified constipation type      loratadine (CLARITIN) 10 MG tablet Take 1 tablet by mouth daily as needed for allergies.  Qty: 30 tablet, Refills: 6    Associated Diagnoses: Allergic rhinitis      Melatonin ER 5 MG TBCR Take 1 tablet by mouth At Bedtime  Qty: 30 tablet, Refills: 11    Associated Diagnoses: Gastroesophageal reflux disease without esophagitis      mometasone-formoterol (DULERA) 200-5 MCG/ACT oral inhaler Inhale 2 puffs into the lungs 2 times daily       PANTOPRAZOLE SODIUM PO Take 40 mg by mouth every morning (before breakfast)      potassium chloride SA (K-DUR/KLOR-CON M) 10 MEQ CR tablet Take 1 tablet (10 mEq) by mouth daily  Qty: 90 tablet, Refills: 3    Associated Diagnoses: Gastroesophageal reflux disease without esophagitis      propranolol (INDERAL) 20 MG tablet Take 1 tablet (20 mg) by mouth 2 times daily  Qty: 90 tablet, Refills: 3    Associated Diagnoses: Gastroesophageal reflux disease without esophagitis      SODIUM BICARBONATE PO Take 650 mg by mouth daily as needed       tiotropium  (SPIRIVA) 18 MCG capsule Inhale 1 capsule into the lungs daily       traZODone (DESYREL) 50 MG tablet Take 1 tablet (50 mg) by mouth At Bedtime  Qty: 90 tablet, Refills: 3    Associated Diagnoses: Osteoarthritis of spine without myelopathy or radiculopathy, sacral and sacrococcygeal region      desonide (DESOWEN) 0.05 % lotion Apply topically as needed  Qty: 118 mL, Refills: 1    Associated Diagnoses: Rosacea                   Consultations:   No consultations were requested during this admission            Discharge Exam:   Blood pressure 134/60, pulse 78, temperature 98.5  F (36.9  C), temperature source Oral, resp. rate 14, weight 82.6 kg (182 lb 1.6 oz), SpO2 98 %.  GENERAL APPEARANCE: healthy, alert and no distress  EYES: conjunctiva clear, eyes grossly normal  HENT: external ears and nose normal   NECK: supple, no masses or adenopathy  RESP: lungs clear to auscultation - no rales, rhonchi or wheezes  CV: regular rate and rhythm, normal S1 S2, no S3 or S4 and no murmur, click or rub   ABDOMEN: soft, nontender, no HSM or masses and bowel sounds normal  MS: no clubbing, cyanosis; no edema  SKIN: clear without significant rashes or lesions  NEURO: Normal strength and tone, sensory exam grossly normal, mentation intact and speech normal    Unresulted Labs Ordered in the Past 30 Days of this Admission     Date and Time Order Name Status Description    5/17/2018 1212 HAPTOGLOBIN In process           No results found for this or any previous visit (from the past 24 hour(s)).         Pending Tests at Discharge:   none         Discharge Instructions and Follow-Up:   Discharge diet: Regular   Discharge activity: Activity as tolerated   Discharge follow-up: F/u oncology as scheduled           Discharge Disposition:   Discharged to home      Attestation:  I have reviewed today's vital signs, notes, medications, labs and imaging.    Time Spent on this Encounter   I, Silviano Vargas, personally saw the patient today and spent  greater than 30 minutes discharging this patient.    Silviano Vargas MD

## 2018-05-18 NOTE — PROGRESS NOTES
[facility] DISCHARGE NOTE    Patient discharged to home at 11:00 AM via wheel chair. Accompanied by other:friend and staff. Discharge instructions reviewed with patient, opportunity offered to ask questions. Prescriptions - None ordered for discharge. All belongings sent with patient. Pt aware that oncology will be contacting him to set up an appointment, appointment number given in case he doesn't hear from them in the next 48 hours.     Nichole C. Bushweiler

## 2018-05-18 NOTE — DISCHARGE INSTRUCTIONS
Oncology will contact you to schedule a follow-up appointment. If they do not contact you within 48 hours, please call [phone] to schedule an appointment

## 2018-05-18 NOTE — PROGRESS NOTES
2nd unit of PRBC completed without transfusion reaction symptoms. Pt has no complaints. He has declined most medications because of Observation status but did take sleeping medication.scheduled labs for morning including hgb.

## 2018-05-21 ENCOUNTER — TELEPHONE (OUTPATIENT)
Dept: FAMILY MEDICINE | Facility: CLINIC | Age: 58
End: 2018-05-21

## 2018-05-21 LAB
ABO + RH BLD: NORMAL
ABO + RH BLD: NORMAL
BLD GP AB SCN SERPL QL: NORMAL
BLD PROD TYP BPU: NORMAL
BLOOD BANK CMNT PATIENT-IMP: NORMAL
NUM BPU REQUESTED: 2
SPECIMEN EXP DATE BLD: NORMAL

## 2018-05-21 NOTE — TELEPHONE ENCOUNTER
"ED/Discharge Protocol    \"Hi, my name is Fatou Fontanez, a registered nurse, and I am calling on behalf of Dr. Funk's office at [facility].  I am calling to follow up and see how things are going for you after your recent visit.\"    \"I see that you were in the (ER/UC/IP) on 5-17-18.    How are you doing now that you are home?\" \"I'm doing good.\"    Is patient experiencing symptoms that may require a hospital visit?  No    Discharge Instructions    \"Let's review your discharge instructions.  What is/are the follow-up recommendations?  Pt. Response: F/U with PCP in 7 days.    \"Were you instructed to make a follow-up appointment?\"  Pt. Response: Yes.  Has appointment been made?   Yes      \"When you see the provider, I would recommend that you bring your discharge instructions with you.    Medications    \"How many new medications are you on since your hospitalization/ED visit?\"    0-1  \"How many of your current medicines changed (dose, timing, name, etc.) while you were in the hospital/ED visit?\"   0-1  \"Do you have questions about your medications?\"   No  \"Were you newly diagnosed with heart failure, COPD, diabetes or did you have a heart attack?\"   No  For patients on insulin: \"Did you start on insulin in the hospital or did you have your insulin dose changed?\"   No    Medication reconciliation completed? Yes    Was MTM referral placed (*Make sure to put transitions as reason for referral)?   No    Call Summary    \"Do you have any questions or concerns about your condition or care plan at the moment?\"    No  Triage nurse advice given: Call with questions    Patient was in ER 0 in the past year (assess appropriateness of ER visits.)      \"If you have questions or things don't continue to improve, we encourage you contact us through the main clinic number,  [phone].  Even if the clinic is not open, triage nurses are available 24/7 to help you.     We would like you to know that our clinic has extended hours " "(provide information).  We also have urgent care (provide details on closest location and hours/contact info)\"      \"Thank you for your time and take care!\"      Fatou ESTRADA RN    "

## 2018-05-24 ENCOUNTER — TELEPHONE (OUTPATIENT)
Dept: ONCOLOGY | Facility: CLINIC | Age: 58
End: 2018-05-24

## 2018-05-24 ENCOUNTER — OFFICE VISIT (OUTPATIENT)
Dept: FAMILY MEDICINE | Facility: CLINIC | Age: 58
End: 2018-05-24
Payer: COMMERCIAL

## 2018-05-24 DIAGNOSIS — Z53.9 ERRONEOUS ENCOUNTER--DISREGARD: Primary | ICD-10-CM

## 2018-05-24 NOTE — PATIENT INSTRUCTIONS
Thank you for choosing [facility].  You may be receiving a survey in the mail from [facility] regarding your visit today.  Please take a few minutes to complete and return the survey to let us know how we are doing.      If you have questions or concerns, please contact us via Authenticlick or you can contact your care team at [phone].    Our Clinic hours are:  Monday 6:40 am  to 7:00 pm  Tuesday -Friday 6:40 am to 5:00 pm    The [place] outpatient lab hours are:  Monday - Friday 6:10 am to 4:45 pm  Saturdays 7:00 am to 11:00 am  Appointments are required, call [phone]    If you have clinical questions after hours or would like to schedule an appointment,  call the clinic at [phone].

## 2018-05-24 NOTE — PROGRESS NOTES
SUBJECTIVE:   Abhijeet Mccauley is a 57 year old male who presents to clinic today for the following health issues:        Visit cancelled. Pt left without being seen.       Parrish Funk

## 2018-05-24 NOTE — MR AVS SNAPSHOT
After Visit Summary   5/24/2018    Abhijeet Mccauley    MRN: [id number]           Patient Information     Date Of Birth          1960        Visit Information        Provider Department      5/24/2018 11:20 AM Parrish Funk MD [facility]        Today's Diagnoses     ERRONEOUS ENCOUNTER--DISREGARD    -  1      Care Instructions          Thank you for choosing [facility] (formerly [facility]).  You may be receiving a survey in the mail from [facility] your visit today.  Please take a few minutes to complete and return the survey to let us know how we are doing.      If you have questions or concerns, please contact us via Lumense or you can contact your care team at [phone].    Our Clinic hours are:  Monday 6:40 am  to 7:00 pm  Tuesday -Friday 6:40 am to 5:00 pm    The [place] outpatient lab hours are:  Monday - Friday 6:10 am to 4:45 pm  Saturdays 7:00 am to 11:00 am  Appointments are required, call [phone]    If you have clinical questions after hours or would like to schedule an appointment,  call the clinic at [phone].            Follow-ups after your visit        Your next 10 appointments already scheduled     Aug 02, 2018  1:00 PM CDT   New Visit with Landen Quiñones MD   [facility] ([facility])    [address] [id number] [phone]              Who to contact     If you have questions or need follow up information about today's clinic visit or your schedule please contact [facility] directly at [phone].  Normal or non-critical lab and imaging results will be communicated to you by MyChart, letter or phone within 4 business days after the clinic has received the results. If you do not hear from us within 7 days, please contact the clinic through SeeOnhart or phone. If you have a critical or abnormal lab result, we will notify you by phone as  soon as possible.  Submit refill requests through wireWAX or call your pharmacy and they will forward the refill request to us. Please allow 3 business days for your refill to be completed.          Additional Information About Your Visit        Care EveryWhere ID     This is your Care EveryWhere ID. This could be used by other organizations to access your [place] medical records  [id number]         Blood Pressure from Last 3 Encounters:   05/18/18 134/60   05/17/18 103/49   05/02/18 130/56    Weight from Last 3 Encounters:   05/24/18 (P) 183 lb (83 kg)   05/18/18 182 lb 1.6 oz (82.6 kg)   05/17/18 187 lb (84.8 kg)              Today, you had the following     No orders found for display       Primary Care Provider Office Phone # Fax #    Parrish Funk [phone] [address] [facility] [address]        Equal Access to Services     JORGE COOL : Hadii sameera pickeringo Soemily, waaxda luqadaha, qaybta kaalmada adeegyada, song chowdhury . So [facility] [phone].    ATENCIÓN: Si habla español, tiene a uriostegui disposición servicios gratuitos de asistencia lingüística. Llame al [phone].    We comply with applicable federal civil rights laws and [place] laws. We do not discriminate on the basis of race, color, national origin, age, disability, sex, sexual orientation, or gender identity.            Thank you!     Thank you for choosing [facility]  for your care. Our goal is always to provide you with excellent care. Hearing back from our patients is one way we can continue to improve our services. Please take a few minutes to complete the written survey that you may receive in the mail after your visit with us. Thank you!             Your Updated Medication List - Protect others around you: Learn how to safely use, store and throw away your medicines at www.disposemymeds.org.          This list is accurate as of 5/24/18  1:18 PM.  Always use your  most recent med list.                   Brand Name Dispense Instructions for use Diagnosis    acetaminophen 500 MG tablet    TYLENOL     Take 1-2 tablets by mouth every 6 hours as needed.        albuterol 108 (90 Base) MCG/ACT Inhaler    PROAIR HFA/PROVENTIL HFA/VENTOLIN HFA    1 Inhaler    Inhale 2 puffs into the lungs every 4 hours as needed    Mild intermittent asthma without complication       desonide 0.05 % lotion    DESOWEN    118 mL    Apply topically as needed    Rosacea       furosemide 20 MG tablet    LASIX    30 tablet    Take 1 tablet (20 mg) by mouth every morning    Osteoarthritis of spine without myelopathy or radiculopathy, sacral and sacrococcygeal region       ibuprofen 400 MG tablet    ADVIL/MOTRIN    30 tablet    Take 1 tablet (400 mg) by mouth every 6 hours as needed for moderate pain    Osteoarthritis of spine without myelopathy or radiculopathy, sacral and sacrococcygeal region       lactulose 10 GM/15ML solution    CHRONULAC    300 mL    Take 10.1 mLs (6.7 g) by mouth 2 times daily    Constipation, unspecified constipation type       loratadine 10 MG tablet    CLARITIN    30 tablet    Take 1 tablet by mouth daily as needed for allergies.    Allergic rhinitis       Melatonin ER 5 MG Tbcr     30 tablet    Take 1 tablet by mouth At Bedtime    Gastroesophageal reflux disease without esophagitis       mometasone-formoterol 200-5 MCG/ACT oral inhaler    DULERA     Inhale 2 puffs into the lungs 2 times daily        PANTOPRAZOLE SODIUM PO      Take 40 mg by mouth every morning (before breakfast)        potassium chloride SA 10 MEQ CR tablet    K-DUR/KLOR-CON M    90 tablet    Take 1 tablet (10 mEq) by mouth daily    Gastroesophageal reflux disease without esophagitis       propranolol 20 MG tablet    INDERAL    90 tablet    Take 1 tablet (20 mg) by mouth 2 times daily    Gastroesophageal reflux disease without esophagitis       SODIUM BICARBONATE PO      Take 650 mg by mouth daily as needed         tiotropium 18 MCG capsule    SPIRIVA     Inhale 1 capsule into the lungs daily        traZODone 50 MG tablet    DESYREL    90 tablet    Take 1 tablet (50 mg) by mouth At Bedtime    Osteoarthritis of spine without myelopathy or radiculopathy, sacral and sacrococcygeal region

## 2018-05-26 ASSESSMENT — ENCOUNTER SYMPTOMS
HEMATURIA: 0
EYE PAIN: 0
EYE DISCHARGE: 0
PHOTOPHOBIA: 0
BLOOD IN STOOL: 0
ABDOMINAL DISTENTION: 0

## 2018-05-31 ENCOUNTER — HOSPITAL ENCOUNTER (OUTPATIENT)
Dept: LAB | Facility: CLINIC | Age: 58
Discharge: HOME | End: 2018-05-31
Attending: INTERNAL MEDICINE | Admitting: INTERNAL MEDICINE
Payer: COMMERCIAL

## 2018-05-31 ENCOUNTER — ONCOLOGY VISIT (OUTPATIENT)
Dept: ONCOLOGY | Facility: CLINIC | Age: 58
End: 2018-05-31
Attending: INTERNAL MEDICINE
Payer: COMMERCIAL

## 2018-05-31 VITALS
HEIGHT: 64 IN | DIASTOLIC BLOOD PRESSURE: 75 MMHG | RESPIRATION RATE: 20 BRPM | TEMPERATURE: 99.3 F | OXYGEN SATURATION: 98 % | HEART RATE: 79 BPM | WEIGHT: 184 LBS | BODY MASS INDEX: 31.41 KG/M2 | SYSTOLIC BLOOD PRESSURE: 148 MMHG

## 2018-05-31 DIAGNOSIS — F17.200 TOBACCO DEPENDENCE SYNDROME: ICD-10-CM

## 2018-05-31 DIAGNOSIS — I10 ESSENTIAL HYPERTENSION: ICD-10-CM

## 2018-05-31 DIAGNOSIS — N18.30 CKD (CHRONIC KIDNEY DISEASE) STAGE 3, GFR 30-59 ML/MIN (H): ICD-10-CM

## 2018-05-31 DIAGNOSIS — C92.01 ACUTE MYELOID LEUKEMIA IN REMISSION (H): ICD-10-CM

## 2018-05-31 DIAGNOSIS — K70.30 ALCOHOLIC CIRRHOSIS OF LIVER WITHOUT ASCITES (H): ICD-10-CM

## 2018-05-31 DIAGNOSIS — D64.9 NORMOCYTIC ANEMIA: Primary | ICD-10-CM

## 2018-05-31 LAB
ALBUMIN SERPL-MCNC: 3.5 G/DL (ref 3.4–5)
ALP SERPL-CCNC: 239 U/L (ref 40–150)
ALT SERPL W P-5'-P-CCNC: 23 U/L (ref 0–70)
ANION GAP SERPL CALCULATED.3IONS-SCNC: 8 MMOL/L (ref 3–14)
AST SERPL W P-5'-P-CCNC: 37 U/L (ref 0–45)
BASOPHILS # BLD AUTO: 0.1 10E9/L (ref 0–0.2)
BASOPHILS NFR BLD AUTO: 0.6 %
BILIRUB SERPL-MCNC: 1.8 MG/DL (ref 0.2–1.3)
BUN SERPL-MCNC: 17 MG/DL (ref 7–30)
CALCIUM SERPL-MCNC: 9.4 MG/DL (ref 8.5–10.1)
CHLORIDE SERPL-SCNC: 108 MMOL/L (ref 94–109)
CO2 SERPL-SCNC: 23 MMOL/L (ref 20–32)
CREAT SERPL-MCNC: 1.8 MG/DL (ref 0.66–1.25)
DIFFERENTIAL METHOD BLD: ABNORMAL
EOSINOPHIL # BLD AUTO: 0.4 10E9/L (ref 0–0.7)
EOSINOPHIL NFR BLD AUTO: 3.6 %
ERYTHROCYTE [DISTWIDTH] IN BLOOD BY AUTOMATED COUNT: 20.7 % (ref 10–15)
FERRITIN SERPL-MCNC: 16 NG/ML (ref 26–388)
GFR SERPL CREATININE-BSD FRML MDRD: 39 ML/MIN/1.7M2
GLUCOSE SERPL-MCNC: 79 MG/DL (ref 70–99)
HCT VFR BLD AUTO: 30.6 % (ref 40–53)
HGB BLD-MCNC: 9.2 G/DL (ref 13.3–17.7)
LYMPHOCYTES # BLD AUTO: 1.8 10E9/L (ref 0.8–5.3)
LYMPHOCYTES NFR BLD AUTO: 16.5 %
MCH RBC QN AUTO: 26.4 PG (ref 26.5–33)
MCHC RBC AUTO-ENTMCNC: 30.1 G/DL (ref 31.5–36.5)
MCV RBC AUTO: 88 FL (ref 78–100)
MONOCYTES # BLD AUTO: 1.9 10E9/L (ref 0–1.3)
MONOCYTES NFR BLD AUTO: 17.5 %
NEUTROPHILS # BLD AUTO: 6.8 10E9/L (ref 1.6–8.3)
NEUTROPHILS NFR BLD AUTO: 61.8 %
PLATELET # BLD AUTO: 199 10E9/L (ref 150–450)
POTASSIUM SERPL-SCNC: 3.9 MMOL/L (ref 3.4–5.3)
PROT SERPL-MCNC: 7.6 G/DL (ref 6.8–8.8)
RBC # BLD AUTO: 3.48 10E12/L (ref 4.4–5.9)
RETICS # AUTO: 76 10E9/L (ref 25–95)
RETICS/RBC NFR AUTO: 2.2 % (ref 0.5–2)
SODIUM SERPL-SCNC: 139 MMOL/L (ref 133–144)
WBC # BLD AUTO: 11.1 10E9/L (ref 4–11)

## 2018-05-31 PROCEDURE — 80053 COMPREHEN METABOLIC PANEL: CPT | Performed by: INTERNAL MEDICINE

## 2018-05-31 PROCEDURE — 85060 BLOOD SMEAR INTERPRETATION: CPT | Performed by: INTERNAL MEDICINE

## 2018-05-31 PROCEDURE — 85025 COMPLETE CBC W/AUTO DIFF WBC: CPT | Performed by: INTERNAL MEDICINE

## 2018-05-31 PROCEDURE — 99205 OFFICE O/P NEW HI 60 MIN: CPT | Performed by: INTERNAL MEDICINE

## 2018-05-31 PROCEDURE — 85045 AUTOMATED RETICULOCYTE COUNT: CPT | Performed by: INTERNAL MEDICINE

## 2018-05-31 PROCEDURE — 36415 COLL VENOUS BLD VENIPUNCTURE: CPT | Performed by: INTERNAL MEDICINE

## 2018-05-31 PROCEDURE — 82728 ASSAY OF FERRITIN: CPT | Performed by: INTERNAL MEDICINE

## 2018-05-31 PROCEDURE — G0463 HOSPITAL OUTPT CLINIC VISIT: HCPCS

## 2018-05-31 PROCEDURE — 40000847 ZZHCL STATISTIC MORPHOLOGY W/INTERP HISTOLOGY TC 85060: Performed by: INTERNAL MEDICINE

## 2018-05-31 ASSESSMENT — PAIN SCALES - GENERAL: PAINLEVEL: NO PAIN (0)

## 2018-05-31 NOTE — PROGRESS NOTES
DATE OF VISIT: May 31, 2018    REASON FOR REFERRAL: Management of anemia    CHIEF COMPLAINT:   Chief Complaint   Patient presents with     Oncology Clinic Visit     New Patient - history of acute myeloid leukemia in remission.        HISTORY OF PRESENT ILLNESS:     Chris Mccauley is a 57-year-old male with history of alcoholism, cirrhosis, chronic kidney disease with baseline creatinine around 2, previous ulcerative colitis, asthma and tobacco use.  with a history of AML diagnosed in approximately June '08 who underwent chemotherapy induction with 7+3 and then 5+2 and subsequent four cycles of high dose casandra-C, completed in 12/08.  The patient presented with 2 episodes of bright red blood per rectum.  He recently was hospitalized at [facility] for melena.  He needed 3 units of blood.  He had an EGD or something that was clipped.  I believe it was a varix.  He had a repeat EGD with Dr. Case and 02/08/2018 without evidence of active bleeding.  There were no varices noted upon admission.  His hemoglobin was 7.4.  His hemoglobin dropped to 6.6 and he was given 1 unit of packed cells.  Two hours after he received the packed cells he had an acute fever and emesis.  Transfusion reaction workup was done and ultimately, the pathologist felt that there was no hemolytic transfusion reaction.  He continues to have shortness of breath on minimal exertion.  He has episodes of black stools.  He is unable to tolerate oral iron.  He denies any fever or chills.  He denies any nausea or vomiting or diarrhea.  Serum ferritin on 19 2018 was 15.  Iron binding capacity 361.  B12 is 645.  Stool for occult blood came back negative.    REVIEW OF SYSTEMS:   Constitutional: Negative for fever, chills, and night sweats.  Skin: negative.  Eyes: negative.  Ears/Nose/Throat: negative.  Respiratory: No shortness of breath, dyspnea on exertion, cough, or hemoptysis.  Cardiovascular: negative.  Gastrointestinal: Back stools as mentioned  above  Genitourinary: negative.  Musculoskeletal: negative.  Neurologic: negative.  Psychiatric: negative.  Hematologic/Lymphatic/Immunologic: negative.  Endocrine: negative.    PAST MEDICAL HISTORY:   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)     s/p chemo, no bone marrow transplant     Chronic obstructive pulmonary disease 5/17/2018     COPD (chronic obstructive pulmonary disease) (H)      History of pulmonary embolus (PE)      Hypertension      PUD (peptic ulcer disease)      Tobacco dependence      Ulcerative colitis (H)        PAST SURGICAL HISTORY:   Past Surgical History:   Procedure Laterality Date     AS TOTAL KNEE ARTHROPLASTY Left      ESOPHAGOSCOPY, GASTROSCOPY, DUODENOSCOPY (EGD), COMBINED N/A 2/8/2018    Procedure: COMBINED ESOPHAGOSCOPY, GASTROSCOPY, DUODENOSCOPY (EGD), BIOPSY SINGLE OR MULTIPLE;  gastroscopy with biopsies;  Surgeon: Raz Cobb MD;  Location: [facility]     ESOPHAGOSCOPY, GASTROSCOPY, DUODENOSCOPY (EGD), COMBINED N/A 3/18/2018    Procedure: COMBINED ESOPHAGOSCOPY, GASTROSCOPY, DUODENOSCOPY (EGD);;  Surgeon: Raz Cobb MD;  Location: [facility]     LACERATION REPAIR Right     Right leg       ALLERGIES:   Allergies as of 05/31/2018 - [place] as Reviewed 05/31/2018   Allergen Reaction Noted     Blood transfusion related (informational only)  03/17/2018     Famotidine Unknown and Other (See Comments) 06/27/2008     Allergy  02/21/2011     Cyclobenzaprine  03/18/2017     Gabapentin  02/22/2016     Methocarbamol  02/22/2016     Motrin [nsaids] Unknown and Hives 06/08/2009     Pepcid Cramps 06/08/2009     Vancomycin  08/11/2008     Vfend  06/08/2009     Hydrocodone-acetaminophen Rash 09/28/2016       MEDICATIONS:   Current Outpatient Prescriptions   Medication Sig Dispense Refill     acetaminophen (TYLENOL) 500 MG tablet Take 1-2 tablets by mouth  every 6 hours as needed.       albuterol (PROAIR HFA/PROVENTIL HFA/VENTOLIN HFA) 108 (90 BASE) MCG/ACT Inhaler Inhale 2 puffs into the lungs every 4 hours as needed 1 Inhaler 11     desonide (DESOWEN) 0.05 % lotion Apply topically as needed 118 mL 1     furosemide (LASIX) 20 MG tablet Take 1 tablet (20 mg) by mouth every morning 30 tablet 11     ibuprofen (ADVIL/MOTRIN) 400 MG tablet Take 1 tablet (400 mg) by mouth every 6 hours as needed for moderate pain 30 tablet 5     loratadine (CLARITIN) 10 MG tablet Take 1 tablet by mouth daily as needed for allergies. 30 tablet 6     Melatonin ER 5 MG TBCR Take 1 tablet by mouth At Bedtime 30 tablet 11     mometasone-formoterol (DULERA) 200-5 MCG/ACT oral inhaler Inhale 2 puffs into the lungs 2 times daily        PANTOPRAZOLE SODIUM PO Take 40 mg by mouth every morning (before breakfast)       potassium chloride SA (K-DUR/KLOR-CON M) 10 MEQ CR tablet Take 1 tablet (10 mEq) by mouth daily 90 tablet 3     propranolol (INDERAL) 20 MG tablet Take 1 tablet (20 mg) by mouth 2 times daily 90 tablet 3     SODIUM BICARBONATE PO Take 650 mg by mouth daily as needed        tiotropium (SPIRIVA) 18 MCG capsule Inhale 1 capsule into the lungs daily Using PRN       traZODone (DESYREL) 50 MG tablet Take 1 tablet (50 mg) by mouth At Bedtime 90 tablet 3     lactulose (CHRONULAC) 10 GM/15ML solution Take 10.1 mLs (6.7 g) by mouth 2 times daily (Patient not taking: Reported on 5/31/2018) 300 mL 3        FAMILY HISTORY:   Family History   Problem Relation Age of Onset     Hypertension Mother      Coronary Artery Disease Father      MI        SOCIAL HISTORY:   Social History     Social History     Marital status: Single     Spouse name: N/A     Number of children: N/A     Years of education: N/A     Social History Main Topics     Smoking status: Current Every Day Smoker     Packs/day: 0.50     Years: 30.00     Types: Cigarettes     Smokeless tobacco: Never Used      Comment: trying to quit, down  "to 4 cigs a day     Alcohol use Yes      Comment: Down to 3 beers per day.  Sometimes a cocktail also.     Drug use: Yes     Special: Marijuana     Sexual activity: Not Asked     Other Topics Concern     None     Social History Narrative       PHYSICAL EXAMINATION:   /75 (BP Location: Right arm, Patient Position: Sitting, Cuff Size: Adult Regular)  Pulse 79  Temp 99.3  F (37.4  C) (Tympanic)  Resp 20  Ht 1.626 m (5' 4\")  Wt 83.5 kg (184 lb)  SpO2 98%  BMI 31.58 kg/m2  Wt Readings from Last 10 Encounters:   05/31/18 83.5 kg (184 lb)   05/24/18 (P) 83 kg (183 lb)   05/18/18 82.6 kg (182 lb 1.6 oz)   05/17/18 84.8 kg (187 lb)   05/02/18 83.7 kg (184 lb 9.6 oz)   03/28/18 83 kg (183 lb)   03/16/18 83.9 kg (184 lb 15.5 oz)   02/21/18 85.3 kg (188 lb)   02/08/18 86.2 kg (190 lb)   01/24/18 83.1 kg (183 lb 4.8 oz)      GENERAL APPEARANCE: Healthy, alert and in no acute distress.  He looks pale  HEENT: Sclerae anicteric. PERRLA. Oropharynx without ulcers, lesions, or thrush.  NECK: Supple. No asymmetry or masses.  LYMPHATICS: No palpable cervical, supraclavicular, axillary, or inguinal lymphadenopathy.  RESP: Lungs clear to auscultation bilaterally without rales, rhonchi or wheezes.  CARDIOVASCULAR: Regular rate and rhythm. Normal S1, S2; no S3 or S4. No murmur, gallop, or rub.  ABDOMEN: Soft, nontender. Bowel sounds normal. No palpable organomegaly or masses.  MUSCULOSKELETAL: Extremities without gross deformities noted. No edema of bilateral lower extremities.  SKIN: No suspicious lesions or rashes.  NEURO: Alert and oriented x 3. Cranial nerves II-XII grossly intact.  PSYCHIATRIC: Mentation and affect appear normal.    LABORATORY RESULTS:  Admission on 05/17/2018, Discharged on 05/18/2018   Component Date Value Ref Range Status     Units Ordered 05/17/2018 2   Final     ABO 05/17/2018 O   Final     RH(D) 05/17/2018 Pos   Final     Antibody Screen 05/17/2018 Neg   Final     Test Valid Only At 05/17/2018 " [facility]      Final     Specimen Expires 05/17/2018 05/20/2018   Final     Crossmatch 05/17/2018 Red Blood Cells   Final     WBC 05/17/2018 15.4* 4.0 - 11.0 10e9/L Final     RBC Count 05/17/2018 2.75* 4.4 - 5.9 10e12/L Final     Hemoglobin 05/17/2018 6.8* 13.3 - 17.7 g/dL Final    Comment: This result has been called to JAREN PALMER by [employer] on 05 17 2018 at 1456, and   has been read back.        Hematocrit 05/17/2018 23.0* 40.0 - 53.0 % Final     MCV 05/17/2018 84  78 - 100 fl Final     MCH 05/17/2018 24.7* 26.5 - 33.0 pg Final     MCHC 05/17/2018 29.6* 31.5 - 36.5 g/dL Final     RDW 05/17/2018 19.2* 10.0 - 15.0 % Final     Platelet Count 05/17/2018 167  150 - 450 10e9/L Final     Diff Method 05/17/2018 Automated Method   Final     % Neutrophils 05/17/2018 73.7  % Final     % Lymphocytes 05/17/2018 9.7  % Final     % Monocytes 05/17/2018 13.3  % Final     % Eosinophils 05/17/2018 2.3  % Final     % Basophils 05/17/2018 0.4  % Final     % Immature Granulocytes 05/17/2018 0.6  % Final     Absolute Neutrophil 05/17/2018 11.3* 1.6 - 8.3 10e9/L Final     Absolute Lymphocytes 05/17/2018 1.5  0.8 - 5.3 10e9/L Final     Absolute Monocytes 05/17/2018 2.1* 0.0 - 1.3 10e9/L Final     Absolute Eosinophils 05/17/2018 0.4  0.0 - 0.7 10e9/L Final     Absolute Basophils 05/17/2018 0.1  0.0 - 0.2 10e9/L Final     Abs Immature Granulocytes 05/17/2018 0.1  0 - 0.4 10e9/L Final     Occult Blood 05/17/2018 neg  neg Final     Internal QC OK 05/17/2018 Yes   Final     Test Card Lot Number 05/17/2018 [id number]   Final     Unit Number 05/17/2018 [id number]   Final     Blood Component Type 05/17/2018 Red Blood Cells Leukocyte Reduced   Final     Division Number 05/17/2018 00   Final     Status of Unit 05/17/2018 Released to care unit   Final     Blood Product Code 05/17/2018 [id number]   Final     Unit Status 05/17/2018 ISS   Final     Unit Number 05/17/2018 [id number]   Final     Blood Component Type 05/17/2018 Red Blood  Cells Leukocyte Reduced   Final     Division Number 05/17/2018 00   Final     Status of Unit 05/17/2018 Released to care unit   Final     Blood Product Code 05/17/2018 [id number]   Final     Unit Status 05/17/2018 ISS   Final     Specimen Description 05/17/2018 Nares   Final     Methicillin Resist/Sens S. aureus * 05/17/2018 Negative  NEG^Negative Final    Comment: MRSA Negative: SA Negative  MRSA and Staphylococcus aureus target DNA not   detected, presumed negative for MRSA and SA colonization or the number of   bacteria present may be below the limit of detection for the assay. FDA   approved assay performed using [employer] GeneXpert(R) real-time PCR.       Sodium 05/18/2018 142  133 - 144 mmol/L Final     Potassium 05/18/2018 3.4  3.4 - 5.3 mmol/L Final     Chloride 05/18/2018 113* 94 - 109 mmol/L Final     Carbon Dioxide 05/18/2018 21  20 - 32 mmol/L Final     Anion Gap 05/18/2018 8  3 - 14 mmol/L Final     Glucose 05/18/2018 84  70 - 99 mg/dL Final     Urea Nitrogen 05/18/2018 15  7 - 30 mg/dL Final     Creatinine 05/18/2018 1.72* 0.66 - 1.25 mg/dL Final     GFR Estimate 05/18/2018 41* >60 mL/min/1.7m2 Final    Non  GFR Calc     GFR Estimate If Black 05/18/2018 49* >60 mL/min/1.7m2 Final    African American GFR Calc     Calcium 05/18/2018 8.2* 8.5 - 10.1 mg/dL Final     WBC 05/18/2018 10.8  4.0 - 11.0 10e9/L Final     RBC Count 05/18/2018 3.07* 4.4 - 5.9 10e12/L Final     Hemoglobin 05/18/2018 8.0* 13.3 - 17.7 g/dL Final     Hematocrit 05/18/2018 25.6* 40.0 - 53.0 % Final     MCV 05/18/2018 83  78 - 100 fl Final     MCH 05/18/2018 26.1* 26.5 - 33.0 pg Final     MCHC 05/18/2018 31.3* 31.5 - 36.5 g/dL Final     RDW 05/18/2018 18.1* 10.0 - 15.0 % Final     Platelet Count 05/18/2018 129* 150 - 450 10e9/L Final         ASSESSMENT AND PLAN:    (D64.9) Normocytic anemia  (primary encounter diagnosis)  I reviewed with the patient today causes of anemia.  Most likely his anemia is related to chronic blood  loss with iron deficiency and anemia of chronic disease secondary to mild renal impairment.  Today I will check Comprehensive metabolic panel, Ferritin, Blood    Morphology Pathologist Review, CBC with platelets differential, Reticulocyte Count,   OCCULT BLOOD, STOOL (3 SPECS), Blood Morphology Pathologist Review.  The patient is unable to tolerate oral iron.  We will arrange for parenteral iron if ferritin continue to be low.  We will also recommend patient to be evaluated by gastroenterology if stool for occult blood came back positive.  I will see the patient again in 1 week time or sooner if there are new developments or concerns.    (C92.01) Acute myeloid leukemia in remission (H)  Today we will check smear and peripheral blood for flow cytometry.    (N18.3) CKD (chronic kidney disease) stage 3, GFR 30-59 ml/min  Serum creatinine is 1.7.  Has been stable    (I10) Essential hypertension  She currently on Lasix 20 mg orally daily.  Metoprolol 20 mg orally daily.    (K70.30) Alcoholic cirrhosis of liver without ascites (H)  I strongly emphasized importance of abstaining from alcohol.    (F17.200) Tobacco dependence syndrome  I strongly emphasized the importance of quitting smoking.    The patient is ready to learn, no apparent learning barriers were identified, Diagnosis and treatment plans were explained to the patient. The patient expressed understanding of the content. The patient questions were answered to his satisfaction.    Dangelo Rowe MD    Chart documentation with Dragon Voice recognition Software. Although reviewed after completion, some words and grammatical errors may remain.

## 2018-05-31 NOTE — MR AVS SNAPSHOT
After Visit Summary   5/31/2018    Abhijeet Mccauley    MRN: [id number]           Patient Information     Date Of Birth          1960        Visit Information        Provider Department      5/31/2018 2:00 PM Dangelo Rowe MD [facility] ONCOLOGY      Today's Diagnoses     Normocytic anemia    -  1    Acute myeloid leukemia in remission (H)        CKD (chronic kidney disease) stage 3, GFR 30-59 ml/min        Essential hypertension        Alcoholic cirrhosis of liver without ascites (H)        Tobacco dependence syndrome          Care Instructions    Dr. Rowe recommends you have labs drawn today and complete a occult blood stool sample. We would like to see you back in clinic with Dr. Rowe next week for results.      Copy of appointments, and after visit summary (AVS) given to patient.      If you have any questions during business hours (M-F 8 AM- 4PM), please call Jennifer Sparks RN, BSN, OCN Oncology Hematology /Breast Cancer Navigator at [facility] [phone].       For questions after business hours, or on holidays/weekends, please call our after hours Nurse Triage line [phone]. Thank you.            Follow-ups after your visit        Follow-up notes from your care team     Return in about 1 week (around 6/7/2018).      Your next 10 appointments already scheduled     Jun 08, 2018  2:15 PM CDT   Return Visit with Dangelo Rowe MD   [facility]    [facility]  [address]  [facility] [address]   [phone]            Aug 02, 2018  1:00 PM CDT   New Visit with Landen Quiñones MD   [facility] ([facility])    [address]  [address]  [facility] [address] [phone]              Future tests that were ordered for you today     Open Future Orders        Priority Expected  "Expires Ordered    OCCULT BLOOD, STOOL (3 SPECS) Routine  6/30/2018 5/31/2018            Who to contact     If you have questions or need follow up information about today's clinic visit or your schedule please contact [facility] directly at [phone].  Normal or non-critical lab and imaging results will be communicated to you by MyChart, letter or phone within 4 business days after the clinic has received the results. If you do not hear from us within 7 days, please contact the clinic through MyChart or phone. If you have a critical or abnormal lab result, we will notify you by phone as soon as possible.  Submit refill requests through [employer] or call your pharmacy and they will forward the refill request to us. Please allow 3 business days for your refill to be completed.          Additional Information About Your Visit        Care EveryWhere ID     This is your Care EveryWhere ID. This could be used by other organizations to access your [place] medical records  [id number]        Your Vitals Were     Pulse Temperature Respirations Height Pulse Oximetry BMI (Body Mass Index)    79 99.3  F (37.4  C) (Tympanic) 20 1.626 m (5' 4\") 98% 31.58 kg/m2       Blood Pressure from Last 3 Encounters:   05/31/18 148/75   05/18/18 134/60   05/17/18 103/49    Weight from Last 3 Encounters:   05/31/18 83.5 kg (184 lb)   05/24/18 (P) 83 kg (183 lb)   05/18/18 82.6 kg (182 lb 1.6 oz)              We Performed the Following     Blood Morphology Pathologist Review     Blood Morphology Pathologist Review     CBC with platelets differential     Reticulocyte Count        Primary Care Provider Office Phone # Fax #    Parrish Funk [phone] [address] [facility] [address]        Equal Access to Services     JORGE COOL : Ashwin Huddleston, maritza underwood, song leiva [facility] [phone].    ATENCIÓN: Si habla " español, tiene a uriostegui disposición servicios gratuitos de asistencia lingüística. Claudy waldron [phone].    We comply with applicable federal civil rights laws and [place] laws. We do not discriminate on the basis of race, color, national origin, age, disability, sex, sexual orientation, or gender identity.            Thank you!     Thank you for choosing [facility]  for your care. Our goal is always to provide you with excellent care. Hearing back from our patients is one way we can continue to improve our services. Please take a few minutes to complete the written survey that you may receive in the mail after your visit with us. Thank you!             Your Updated Medication List - Protect others around you: Learn how to safely use, store and throw away your medicines at www.disposemymeds.org.          This list is accurate as of 5/31/18  2:47 PM.  Always use your most recent med list.                   Brand Name Dispense Instructions for use Diagnosis    acetaminophen 500 MG tablet    TYLENOL     Take 1-2 tablets by mouth every 6 hours as needed.        albuterol 108 (90 Base) MCG/ACT Inhaler    PROAIR HFA/PROVENTIL HFA/VENTOLIN HFA    1 Inhaler    Inhale 2 puffs into the lungs every 4 hours as needed    Mild intermittent asthma without complication       desonide 0.05 % lotion    DESOWEN    118 mL    Apply topically as needed    Rosacea       furosemide 20 MG tablet    LASIX    30 tablet    Take 1 tablet (20 mg) by mouth every morning    Osteoarthritis of spine without myelopathy or radiculopathy, sacral and sacrococcygeal region       ibuprofen 400 MG tablet    ADVIL/MOTRIN    30 tablet    Take 1 tablet (400 mg) by mouth every 6 hours as needed for moderate pain    Osteoarthritis of spine without myelopathy or radiculopathy, sacral and sacrococcygeal region       lactulose 10 GM/15ML solution    CHRONULAC    300 mL    Take 10.1 mLs (6.7 g) by mouth 2 times daily    Constipation, unspecified  constipation type       loratadine 10 MG tablet    CLARITIN    30 tablet    Take 1 tablet by mouth daily as needed for allergies.    Allergic rhinitis       Melatonin ER 5 MG Tbcr     30 tablet    Take 1 tablet by mouth At Bedtime    Gastroesophageal reflux disease without esophagitis       mometasone-formoterol 200-5 MCG/ACT oral inhaler    DULERA     Inhale 2 puffs into the lungs 2 times daily        PANTOPRAZOLE SODIUM PO      Take 40 mg by mouth every morning (before breakfast)        potassium chloride SA 10 MEQ CR tablet    K-DUR/KLOR-CON M    90 tablet    Take 1 tablet (10 mEq) by mouth daily    Gastroesophageal reflux disease without esophagitis       propranolol 20 MG tablet    INDERAL    90 tablet    Take 1 tablet (20 mg) by mouth 2 times daily    Gastroesophageal reflux disease without esophagitis       SODIUM BICARBONATE PO      Take 650 mg by mouth daily as needed        tiotropium 18 MCG capsule    SPIRIVA     Inhale 1 capsule into the lungs daily Using PRN        traZODone 50 MG tablet    DESYREL    90 tablet    Take 1 tablet (50 mg) by mouth At Bedtime    Osteoarthritis of spine without myelopathy or radiculopathy, sacral and sacrococcygeal region

## 2018-05-31 NOTE — PATIENT INSTRUCTIONS
Dr. Rowe recommends you have labs drawn today and complete a occult blood stool sample. We would like to see you back in clinic with Dr. Rowe next week for results.      Copy of appointments, and after visit summary (AVS) given to patient.      If you have any questions during business hours (M-F 8 AM- 4PM), please call Jennifer Sparks RN, BSN, OCN Oncology Hematology /Breast Cancer Navigator at [facility] [phone].       For questions after business hours, or on holidays/weekends, please call our after hours Nurse Triage line [phone]. Thank you.

## 2018-05-31 NOTE — LETTER
5/31/2018         RE: Abhijeet Mccauley  [address] [place] [id number]        Dear Colleague,    Thank you for referring your patient, Abhijeet Mccauley, to the [facility]. Please see a copy of my visit note below.    DATE OF VISIT: May 31, 2018    REASON FOR REFERRAL: Management of anemia    CHIEF COMPLAINT:   Chief Complaint   Patient presents with     Oncology Clinic Visit     New Patient - history of acute myeloid leukemia in remission.        HISTORY OF PRESENT ILLNESS:     Chris Mccauley is a 57-year-old male with history of alcoholism, cirrhosis, chronic kidney disease with baseline creatinine around 2, previous ulcerative colitis, asthma and tobacco use.  with a history of AML diagnosed in approximately June '08 who underwent chemotherapy induction with 7+3 and then 5+2 and subsequent four cycles of high dose casandra-C, completed in 12/08.  The patient presented with 2 episodes of bright red blood per rectum.  He recently was hospitalized at [facility] for melena.  He needed 3 units of blood.  He had an EGD or something that was clipped.  I believe it was a varix.  He had a repeat EGD with Dr. Case and 02/08/2018 without evidence of active bleeding.  There were no varices noted upon admission.  His hemoglobin was 7.4.  His hemoglobin dropped to 6.6 and he was given 1 unit of packed cells.  Two hours after he received the packed cells he had an acute fever and emesis.  Transfusion reaction workup was done and ultimately, the pathologist felt that there was no hemolytic transfusion reaction.  He continues to have shortness of breath on minimal exertion.  He has episodes of black stools.  He is unable to tolerate oral iron.  He denies any fever or chills.  He denies any nausea or vomiting or diarrhea.  Serum ferritin on 19 2018 was 15.  Iron binding capacity 361.  B12 is 645.  Stool for occult blood came back negative.    REVIEW OF SYSTEMS:   Constitutional: Negative for fever, chills, and  night sweats.  Skin: negative.  Eyes: negative.  Ears/Nose/Throat: negative.  Respiratory: No shortness of breath, dyspnea on exertion, cough, or hemoptysis.  Cardiovascular: negative.  Gastrointestinal: Back stools as mentioned above  Genitourinary: negative.  Musculoskeletal: negative.  Neurologic: negative.  Psychiatric: negative.  Hematologic/Lymphatic/Immunologic: negative.  Endocrine: negative.    PAST MEDICAL HISTORY:   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)     s/p chemo, no bone marrow transplant     Chronic obstructive pulmonary disease 5/17/2018     COPD (chronic obstructive pulmonary disease) (H)      History of pulmonary embolus (PE)      Hypertension      PUD (peptic ulcer disease)      Tobacco dependence      Ulcerative colitis (H)        PAST SURGICAL HISTORY:   Past Surgical History:   Procedure Laterality Date     AS TOTAL KNEE ARTHROPLASTY Left      ESOPHAGOSCOPY, GASTROSCOPY, DUODENOSCOPY (EGD), COMBINED N/A 2/8/2018    Procedure: COMBINED ESOPHAGOSCOPY, GASTROSCOPY, DUODENOSCOPY (EGD), BIOPSY SINGLE OR MULTIPLE;  gastroscopy with biopsies;  Surgeon: Raz Cobb MD;  Location: [facility]     ESOPHAGOSCOPY, GASTROSCOPY, DUODENOSCOPY (EGD), COMBINED N/A 3/18/2018    Procedure: COMBINED ESOPHAGOSCOPY, GASTROSCOPY, DUODENOSCOPY (EGD);;  Surgeon: Raz Cobb MD;  Location: [facility]     LACERATION REPAIR Right     Right leg       ALLERGIES:   Allergies as of 05/31/2018 - [place] as Reviewed 05/31/2018   Allergen Reaction Noted     Blood transfusion related (informational only)  03/17/2018     Famotidine Unknown and Other (See Comments) 06/27/2008     Allergy  02/21/2011     Cyclobenzaprine  03/18/2017     Gabapentin  02/22/2016     Methocarbamol  02/22/2016     Motrin [nsaids] Unknown and Hives 06/08/2009     Pepcid Cramps 06/08/2009     Vancomycin   08/11/2008     Vfend  06/08/2009     Hydrocodone-acetaminophen Rash 09/28/2016       MEDICATIONS:   Current Outpatient Prescriptions   Medication Sig Dispense Refill     acetaminophen (TYLENOL) 500 MG tablet Take 1-2 tablets by mouth every 6 hours as needed.       albuterol (PROAIR HFA/PROVENTIL HFA/VENTOLIN HFA) 108 (90 BASE) MCG/ACT Inhaler Inhale 2 puffs into the lungs every 4 hours as needed 1 Inhaler 11     desonide (DESOWEN) 0.05 % lotion Apply topically as needed 118 mL 1     furosemide (LASIX) 20 MG tablet Take 1 tablet (20 mg) by mouth every morning 30 tablet 11     ibuprofen (ADVIL/MOTRIN) 400 MG tablet Take 1 tablet (400 mg) by mouth every 6 hours as needed for moderate pain 30 tablet 5     loratadine (CLARITIN) 10 MG tablet Take 1 tablet by mouth daily as needed for allergies. 30 tablet 6     Melatonin ER 5 MG TBCR Take 1 tablet by mouth At Bedtime 30 tablet 11     mometasone-formoterol (DULERA) 200-5 MCG/ACT oral inhaler Inhale 2 puffs into the lungs 2 times daily        PANTOPRAZOLE SODIUM PO Take 40 mg by mouth every morning (before breakfast)       potassium chloride SA (K-DUR/KLOR-CON M) 10 MEQ CR tablet Take 1 tablet (10 mEq) by mouth daily 90 tablet 3     propranolol (INDERAL) 20 MG tablet Take 1 tablet (20 mg) by mouth 2 times daily 90 tablet 3     SODIUM BICARBONATE PO Take 650 mg by mouth daily as needed        tiotropium (SPIRIVA) 18 MCG capsule Inhale 1 capsule into the lungs daily Using PRN       traZODone (DESYREL) 50 MG tablet Take 1 tablet (50 mg) by mouth At Bedtime 90 tablet 3     lactulose (CHRONULAC) 10 GM/15ML solution Take 10.1 mLs (6.7 g) by mouth 2 times daily (Patient not taking: Reported on 5/31/2018) 300 mL 3        FAMILY HISTORY:   Family History   Problem Relation Age of Onset     Hypertension Mother      Coronary Artery Disease Father      MI        SOCIAL HISTORY:   Social History     Social History     Marital status: Single     Spouse name: N/A     Number of children:  "N/A     Years of education: N/A     Social History Main Topics     Smoking status: Current Every Day Smoker     Packs/day: 0.50     Years: 30.00     Types: Cigarettes     Smokeless tobacco: Never Used      Comment: trying to quit, down to 4 cigs a day     Alcohol use Yes      Comment: Down to 3 beers per day.  Sometimes a cocktail also.     Drug use: Yes     Special: Marijuana     Sexual activity: Not Asked     Other Topics Concern     None     Social History Narrative       PHYSICAL EXAMINATION:   /75 (BP Location: Right arm, Patient Position: Sitting, Cuff Size: Adult Regular)  Pulse 79  Temp 99.3  F (37.4  C) (Tympanic)  Resp 20  Ht 1.626 m (5' 4\")  Wt 83.5 kg (184 lb)  SpO2 98%  BMI 31.58 kg/m2  Wt Readings from Last 10 Encounters:   05/31/18 83.5 kg (184 lb)   05/24/18 (P) 83 kg (183 lb)   05/18/18 82.6 kg (182 lb 1.6 oz)   05/17/18 84.8 kg (187 lb)   05/02/18 83.7 kg (184 lb 9.6 oz)   03/28/18 83 kg (183 lb)   03/16/18 83.9 kg (184 lb 15.5 oz)   02/21/18 85.3 kg (188 lb)   02/08/18 86.2 kg (190 lb)   01/24/18 83.1 kg (183 lb 4.8 oz)      GENERAL APPEARANCE: Healthy, alert and in no acute distress.  He looks pale  HEENT: Sclerae anicteric. PERRLA. Oropharynx without ulcers, lesions, or thrush.  NECK: Supple. No asymmetry or masses.  LYMPHATICS: No palpable cervical, supraclavicular, axillary, or inguinal lymphadenopathy.  RESP: Lungs clear to auscultation bilaterally without rales, rhonchi or wheezes.  CARDIOVASCULAR: Regular rate and rhythm. Normal S1, S2; no S3 or S4. No murmur, gallop, or rub.  ABDOMEN: Soft, nontender. Bowel sounds normal. No palpable organomegaly or masses.  MUSCULOSKELETAL: Extremities without gross deformities noted. No edema of bilateral lower extremities.  SKIN: No suspicious lesions or rashes.  NEURO: Alert and oriented x 3. Cranial nerves II-XII grossly intact.  PSYCHIATRIC: Mentation and affect appear normal.    LABORATORY RESULTS:  Admission on 05/17/2018, Discharged " on 05/18/2018   Component Date Value Ref Range Status     Units Ordered 05/17/2018 2   Final     ABO 05/17/2018 O   Final     RH(D) 05/17/2018 Pos   Final     Antibody Screen 05/17/2018 Neg   Final     Test Valid Only At 05/17/2018 [facility]      Final     Specimen Expires 05/17/2018 05/20/2018   Final     Crossmatch 05/17/2018 Red Blood Cells   Final     WBC 05/17/2018 15.4* 4.0 - 11.0 10e9/L Final     RBC Count 05/17/2018 2.75* 4.4 - 5.9 10e12/L Final     Hemoglobin 05/17/2018 6.8* 13.3 - 17.7 g/dL Final    Comment: This result has been called to JAREN PALMER by [employer] on 05 17 2018 at 1456, and   has been read back.        Hematocrit 05/17/2018 23.0* 40.0 - 53.0 % Final     MCV 05/17/2018 84  78 - 100 fl Final     MCH 05/17/2018 24.7* 26.5 - 33.0 pg Final     MCHC 05/17/2018 29.6* 31.5 - 36.5 g/dL Final     RDW 05/17/2018 19.2* 10.0 - 15.0 % Final     Platelet Count 05/17/2018 167  150 - 450 10e9/L Final     Diff Method 05/17/2018 Automated Method   Final     % Neutrophils 05/17/2018 73.7  % Final     % Lymphocytes 05/17/2018 9.7  % Final     % Monocytes 05/17/2018 13.3  % Final     % Eosinophils 05/17/2018 2.3  % Final     % Basophils 05/17/2018 0.4  % Final     % Immature Granulocytes 05/17/2018 0.6  % Final     Absolute Neutrophil 05/17/2018 11.3* 1.6 - 8.3 10e9/L Final     Absolute Lymphocytes 05/17/2018 1.5  0.8 - 5.3 10e9/L Final     Absolute Monocytes 05/17/2018 2.1* 0.0 - 1.3 10e9/L Final     Absolute Eosinophils 05/17/2018 0.4  0.0 - 0.7 10e9/L Final     Absolute Basophils 05/17/2018 0.1  0.0 - 0.2 10e9/L Final     Abs Immature Granulocytes 05/17/2018 0.1  0 - 0.4 10e9/L Final     Occult Blood 05/17/2018 neg  neg Final     Internal QC OK 05/17/2018 Yes   Final     Test Card Lot Number 05/17/2018 [id number]   Final     Unit Number 05/17/2018 [id number]   Final     Blood Component Type 05/17/2018 Red Blood Cells Leukocyte Reduced   Final     Division Number 05/17/2018 00   Final     Status of  Unit 05/17/2018 Released to care unit   Final     Blood Product Code 05/17/2018 [id number]   Final     Unit Status 05/17/2018 ISS   Final     Unit Number 05/17/2018 [id number]   Final     Blood Component Type 05/17/2018 Red Blood Cells Leukocyte Reduced   Final     Division Number 05/17/2018 00   Final     Status of Unit 05/17/2018 Released to care unit   Final     Blood Product Code 05/17/2018 [id number]   Final     Unit Status 05/17/2018 ISS   Final     Specimen Description 05/17/2018 Nares   Final     Methicillin Resist/Sens S. aureus * 05/17/2018 Negative  NEG^Negative Final    Comment: MRSA Negative: SA Negative  MRSA and Staphylococcus aureus target DNA not   detected, presumed negative for MRSA and SA colonization or the number of   bacteria present may be below the limit of detection for the assay. FDA   approved assay performed using [employer] GeneXpert(R) real-time PCR.       Sodium 05/18/2018 142  133 - 144 mmol/L Final     Potassium 05/18/2018 3.4  3.4 - 5.3 mmol/L Final     Chloride 05/18/2018 113* 94 - 109 mmol/L Final     Carbon Dioxide 05/18/2018 21  20 - 32 mmol/L Final     Anion Gap 05/18/2018 8  3 - 14 mmol/L Final     Glucose 05/18/2018 84  70 - 99 mg/dL Final     Urea Nitrogen 05/18/2018 15  7 - 30 mg/dL Final     Creatinine 05/18/2018 1.72* 0.66 - 1.25 mg/dL Final     GFR Estimate 05/18/2018 41* >60 mL/min/1.7m2 Final    Non  GFR Calc     GFR Estimate If Black 05/18/2018 49* >60 mL/min/1.7m2 Final    African American GFR Calc     Calcium 05/18/2018 8.2* 8.5 - 10.1 mg/dL Final     WBC 05/18/2018 10.8  4.0 - 11.0 10e9/L Final     RBC Count 05/18/2018 3.07* 4.4 - 5.9 10e12/L Final     Hemoglobin 05/18/2018 8.0* 13.3 - 17.7 g/dL Final     Hematocrit 05/18/2018 25.6* 40.0 - 53.0 % Final     MCV 05/18/2018 83  78 - 100 fl Final     MCH 05/18/2018 26.1* 26.5 - 33.0 pg Final     MCHC 05/18/2018 31.3* 31.5 - 36.5 g/dL Final     RDW 05/18/2018 18.1* 10.0 - 15.0 % Final     Platelet Count  05/18/2018 129* 150 - 450 10e9/L Final         ASSESSMENT AND PLAN:    (D64.9) Normocytic anemia  (primary encounter diagnosis)  I reviewed with the patient today causes of anemia.  Most likely his anemia is related to chronic blood loss with iron deficiency and anemia of chronic disease secondary to mild renal impairment.  Today I will check Comprehensive metabolic panel, Ferritin, Blood    Morphology Pathologist Review, CBC with platelets differential, Reticulocyte Count,   OCCULT BLOOD, STOOL (3 SPECS), Blood Morphology Pathologist Review.  The patient is unable to tolerate oral iron.  We will arrange for parenteral iron if ferritin continue to be low.  We will also recommend patient to be evaluated by gastroenterology if stool for occult blood came back positive.  I will see the patient again in 1 week time or sooner if there are new developments or concerns.    (C92.01) Acute myeloid leukemia in remission (H)  Today we will check smear and peripheral blood for flow cytometry.    (N18.3) CKD (chronic kidney disease) stage 3, GFR 30-59 ml/min  Serum creatinine is 1.7.  Has been stable    (I10) Essential hypertension  She currently on Lasix 20 mg orally daily.  Metoprolol 20 mg orally daily.    (K70.30) Alcoholic cirrhosis of liver without ascites (H)  I strongly emphasized importance of abstaining from alcohol.    (F17.200) Tobacco dependence syndrome  I strongly emphasized the importance of quitting smoking.    The patient is ready to learn, no apparent learning barriers were identified, Diagnosis and treatment plans were explained to the patient. The patient expressed understanding of the content. The patient questions were answered to his satisfaction.    Dangelo Rowe MD    Chart documentation with Dragon Voice recognition Software. Although reviewed after completion, some words and grammatical errors may remain.    Again, thank you for allowing me to participate in the care of your patient.         Sincerely,        Dangelo Rowe MD

## 2018-05-31 NOTE — NURSING NOTE
"Oncology Rooming Note    May 31, 2018 2:17 PM   Abhijeet Mccauley is a 57 year old male who presents for:    Chief Complaint   Patient presents with     Oncology Clinic Visit     New Patient - history of acute myeloid leukemia in remission.      Initial Vitals: /75 (BP Location: Right arm, Patient Position: Sitting, Cuff Size: Adult Regular)  Pulse 79  Temp 99.3  F (37.4  C) (Tympanic)  Resp 20  Ht 1.626 m (5' 4\")  Wt 83.5 kg (184 lb)  SpO2 98%  BMI 31.58 kg/m2 Estimated body mass index is 31.58 kg/(m^2) as calculated from the following:    Height as of this encounter: 1.626 m (5' 4\").    Weight as of this encounter: 83.5 kg (184 lb). Body surface area is 1.94 meters squared.  No Pain (0) Comment: Data Unavailable   No LMP for male patient.  Allergies reviewed: Yes  Medications reviewed: Yes    Medications: Medication refills not needed today.  Pharmacy name entered into [facility]:      [facility] [place], [place] - [address] [facility]    Clinical concerns: New Patient - history of acute myeloid leukemia in remission.     7  minutes for nursing intake (face to face time)     Charleen Vuong CMA              "

## 2018-06-01 LAB — COPATH REPORT: NORMAL

## 2018-06-01 NOTE — PROGRESS NOTES
Message to Mitali requesting call back from patient  email to [facility] group sent. [facility] is holding 06.12.18 for this patient.

## 2018-06-04 NOTE — PROGRESS NOTES
Spoke with patient, he is able to come to clinic for bmbx 06.12.18 at 0730. Arrive at 0630.  Pt is aware of NPO status and need for . Denies questions or concerns at this time. Order in Epic.

## 2018-06-07 ENCOUNTER — TELEPHONE (OUTPATIENT)
Dept: ONCOLOGY | Facility: CLINIC | Age: 58
End: 2018-06-07

## 2018-06-07 ENCOUNTER — ANESTHESIA EVENT (OUTPATIENT)
Dept: GASTROENTEROLOGY | Facility: CLINIC | Age: 58
End: 2018-06-07
Payer: COMMERCIAL

## 2018-06-07 DIAGNOSIS — D64.9 NORMOCYTIC ANEMIA: Primary | ICD-10-CM

## 2018-06-07 ASSESSMENT — COPD QUESTIONNAIRES: COPD: 1

## 2018-06-07 ASSESSMENT — LIFESTYLE VARIABLES: TOBACCO_USE: 1

## 2018-06-07 NOTE — TELEPHONE ENCOUNTER
Pt called to ask about Hgb that was drawn last week. Informed pt of results. He does report pink tinged urine, but does not want to come in today for UA/UC. He states that he will come in tomorrow. Pt also questioned if he needs IV iron. MD notified. Instructed pt to go to Urgent Care or ED if he notices blood or clots in urine or if he develops a fever. Pt verbalized an understanding.     Marques Howard RN on 6/7/2018 at 1:54 PM

## 2018-06-07 NOTE — ANESTHESIA PREPROCEDURE EVALUATION
Anesthesia Evaluation     . Pt has had prior anesthetic. Type: General, MAC and Regional    History of anesthetic complications          ROS/MED HX    ENT/Pulmonary:     (+)tobacco use, Current use Intermittent asthma COPD, , . Other pulmonary disease hx of PE .    Neurologic:     (+)other neuro hearing loss    Cardiovascular:     (+) hypertension----. : . . . :. . Previous cardiac testing Echodate:11/27/17results:      Echocardiogram Complete (Order [id number]) - Reflex for Order [id number]      Result Notes     Notes Recorded by Catherine Dalal RN on 11/29/2017 at 3:54 PM  Patient called with the normal results.  Still having some SOA will follow up with Dr. Funk. Catherine WANG RN    ------    Notes Recorded by Parrish Funk MD on 11/27/2017 at 4:55 PM  Interpretation Summary   Left ventricular systolic function is normal.  The visual ejection fraction is estimated at 55-60%.  No regional wall motion abnormalities noted.  There are minor valve changes, that are not significant.     This is an essentially normal echo. .Parrish Funk                  Comprehensive metabolic panel (Order [id number])      Result Notes     Notes Recorded by Eli Piña RN on 11/16/2017 at 3:55 PM  I was able to contact the pt at [phone]. He has been given the below information. He states that he has scheduled the Echo.   Eli Piña RN  ------    Notes Recorded by Eli Piña RN on 11/15/2017 at 4:26 PM  A message has been left for the pt to return a call to the clinic. I have placed an order for the Echo.  Eli Piña RN  ------    Notes Recorded by Parrish Funk MD on 11/15/2017 at 1:14 PM  Two things. The creatinine is much higher than recently. He has a kidney doctor, and he should call them for an appt soon.   The other is the mildly high BNP. This could indicate mild heart failure. He should have an echocardiogram for dyspnea.   Please order this and have him see me after the test, in  clinic. .Anthony Funk            Results     Echocardiogram Complete (Order [id number])      External Result Report     External Result Report     PACS Images     Show images for Echocardiogram Complete     Echocardiogram Complete   Status: Edited Result - FINAL   Visible to patient: No (Inaccessible in MyChart) Next appt: 2018 at 02:15 PM in Oncology (Dangelo Rowe MD) Dx: Shortness of breath Order: [id number] - Reflex for Order [id number]    Notes Recorded by Catherine Dalal RN on 2017 at 3:54 PM  Patient called with the normal results.  Still having some SOA will follow up with Dr. Funk. Catherine WANG RN    ------    Notes Recorded by Anthony Funk MD on 2017 at 4:55 PM  Interpretation Summary   Left ventricular systolic function is normal.  The visual ejection fraction is estimated at 55-60%.  No regional wall motion abnormalities noted.  There are minor valve changes, that are not significant.     This is an essentially normal echo. .Anthony Funk        Details     Reading Physician Reading Date Result Priority    Paulina HorvathDO 2017        Narrative          [id number]  [facility]19  [id number]  [id number]^JOSE^ANTHONY^MAUREEN           [facility]  Echocardiography Laboratory  [facility] [place] [address]        Name: ALVA MARISCAL  MRN: [id number]  : 1960  Study Date: 2017 01:11 PM  Age: 56 yrs  Gender: Male  Patient Location: [facility]  Reason For Study: Shortness of breath  Ordering Physician: ANTHONY FUNK  Referring Physician: ANTHONY FUNK  Performed By: Jojo Santana RDCS     BSA: 2.0 m2  Height: 66 in  Weight: 192 lb  HR: 80  BP: 135/74 mmHg  _____________________________________________________________________________  __        Procedure  Complete Echo Adult.  _____________________________________________________________________________  __        Interpretation Summary     Left ventricular  systolic function is normal.  The visual ejection fraction is estimated at 55-60%.  No regional wall motion abnormalities noted.  _____________________________________________________________________________  __        Left Ventricle  The left ventricle is normal in size. Left ventricular systolic function is  normal. The visual ejection fraction is estimated at 55-60%. Grade I or early  diastolic dysfunction. No regional wall motion abnormalities noted.     Right Ventricle  The right ventricle is normal in structure, function and size.     Atria  The left atrium is moderately dilated. Right atrium not well visualized.     Mitral Valve  There is mild mitral annular calcification. There is trace mitral  regurgitation. There is no mitral valve stenosis.        Tricuspid Valve  The tricuspid valve is not well visualized, but is grossly normal. Right  ventricular systolic pressure could not be approximated due to inadequate  tricuspid regurgitation.     Aortic Valve  The aortic valve is not well visualized. The aortic valve is trileaflet. The  calculated aortic valve are is 2.1 cm^2. Increased aortic valve velocity.     Pulmonic Valve  The pulmonic valve is not well seen, but is grossly normal. Normal pulmonic  valve velocity.     Vessels  Normal size aorta. The inferior vena cava is not dilated.     Pericardium  There is no pericardial effusion.     _____________________________________________________________________________  __  MMode/2D Measurements & Calculations  IVSd: 1.1 cm  LVIDd: 5.3 cm  LVIDs: 3.3 cm  LVPWd: 1.3 cm  FS: 37.9 %  EDV(Teich): 134.9 ml  ESV(Teich): 43.8 ml  LV mass(C)d: 245.1 grams  LV mass(C)dI: 124.7 grams/m2     Ao root diam: 3.3 cm  LA dimension: 4.8 cm  asc Aorta Diam: 3.4 cm  LA/Ao: 1.4  LVOT diam: 2.2 cm  LVOT area: 3.8 cm2  LA Volume (BP): 91.0 ml  LA Volume Index (BP): 46.2 ml/m2  RWT: 0.48        Doppler Measurements & Calculations  MV E max robi: 106.4 cm/sec  MV A max robi: 97.6  cm/sec  MV E/A: 1.1  MV dec time: 0.26 sec     Ao V2 max: 262.7 cm/sec  Ao max P.0 mmHg  Ao V2 mean: 172.7 cm/sec  Ao mean P.5 mmHg  Ao V2 VTI: 48.7 cm  JAE(I,D): 2.1 cm2  JAE(V,D): 2.1 cm2  LV V1 max P.8 mmHg  LV V1 max: 148.1 cm/sec  LV V1 VTI: 26.6 cm  SV(LVOT): 100.9 ml  SI(LVOT): 51.3 ml/m2  JAE Index (cm2/m2): 1.1  E/E' av.9  Lateral E/e': 13.1  Medial E/e': 16.7           _____________________________________________________________________________  __           Report approved by: Delgado Raymond 2017 02:07 PM           date: results:ECG reviewed date:2009 results:SR date: results:          METS/Exercise Tolerance:  4 - Raking leaves, gardening   Hematologic:     (+) Anemia, Other Hematologic Disorder-pancytopenia ; coagulation defect ; leukocytosis      Musculoskeletal:   (+) arthritis, , , -       GI/Hepatic:     (+) hepatitis type Alcoholic, liver disease, Other GI/Hepatic ETOH dependence; PUD; ulcerative colitis; tubular adenoma      Renal/Genitourinary:     (+) chronic renal disease,       Endo:     (+) Obesity, .      Psychiatric:  - neg psychiatric ROS       Infectious Disease:         Malignancy:   (+) Malignancy History of Lymphoma/Leukemia  Lymph CA Remission status post,         Other: Comment: levon                    Physical Exam  Normal systems: cardiovascular, pulmonary and dental    Airway   Mallampati: II    Dental     Cardiovascular       Pulmonary                     Anesthesia Plan      History & Physical Review      ASA Status:  3 .    NPO Status:  > 8 hours    Plan for MAC Reason for MAC:  Deep or markedly invasive procedure (G8)         Postoperative Care  Postoperative pain management:  IV analgesics.      Consents  Anesthetic plan, risks, benefits and alternatives discussed with:  Patient..                          .  Anesthesia Evaluation     . Pt has had prior anesthetic. Type: Regional and MAC    No history of anesthetic  complications          ROS/MED HX    ENT/Pulmonary:     (+)tobacco use, Current use asthma Treatment: Inhaled steroids, Inhaler prn and Inhaler daily,  moderate COPD, , . .    Neurologic:       Cardiovascular:     (+) hypertension----. : . . . :. . Previous cardiac testing Echodate:11-2017results:Interpretation Summary   Left ventricular systolic function is normal.  The visual ejection fraction is estimated at 55-60%.  No regional wall motion abnormalities noted.  There are minor valve changes, that are not significant.     This is an essentially normal echo. .Parrish Diallo: results:ECG reviewed date:8-2009 results:Sinus rhythm  Normal ECG  When compared with ECG of 20-DEC-2008 08:28,  No significant change was found date: results:          METS/Exercise Tolerance:     Hematologic:     (+) History of blood clots pt is not anticoagulated, Anemia, History of Transfusion no previous transfusion reaction Other Hematologic Disorder-thrombocytopenia      Musculoskeletal:         GI/Hepatic:     (+) hepatitis type Alcoholic, liver disease, Other GI/Hepatic alcoholic cirrosis, acute GI bleed, ulcerative colitis      Renal/Genitourinary:     (+) chronic renal disease, type: CRI, Pt does not require dialysis, Pt has no history of transplant,       Endo:     (+) Obesity, .      Psychiatric:         Infectious Disease:         Malignancy:   (+) Malignancy History of Other  Other CA AML Remission status post Chemo         Other:                     Physical Exam  Normal systems: cardiovascular, pulmonary and dental    Airway   Mallampati: II  TM distance: >3 FB  Neck ROM: full    Dental     Cardiovascular       Pulmonary                     Anesthesia Plan      History & Physical Review  History and physical reviewed and following examination; no interval change.    ASA Status:  3 emergent.    NPO Status:  > 8 hours    Plan for MAC Maintenance will be Balanced.  Reason for MAC:  Deep or markedly invasive procedure  (G8)         Postoperative Care      Consents  Anesthetic plan, risks, benefits and alternatives discussed with:  Patient..                          .  Anesthesia Evaluation     . Pt has had prior anesthetic. Type: Regional and MAC    No history of anesthetic complications          ROS/MED HX    ENT/Pulmonary:     (+)tobacco use, Current use asthma Treatment: Inhaled steroids, Inhaler prn and Inhaler daily,  moderate COPD, , . .    Neurologic:       Cardiovascular:     (+) hypertension----. : . . . :. . Previous cardiac testing Echodate:11-2017results:Interpretation Summary   Left ventricular systolic function is normal.  The visual ejection fraction is estimated at 55-60%.  No regional wall motion abnormalities noted.  There are minor valve changes, that are not significant.     This is an essentially normal echo. .Parrish Diallo: results:ECG reviewed date:8-2009 results:Sinus rhythm  Normal ECG  When compared with ECG of 20-DEC-2008 08:28,  No significant change was found date: results:          METS/Exercise Tolerance:     Hematologic:     (+) History of blood clots pt is not anticoagulated, Anemia, History of Transfusion no previous transfusion reaction Other Hematologic Disorder-thrombocytopenia      Musculoskeletal:         GI/Hepatic:     (+) hepatitis type Alcoholic, liver disease, Other GI/Hepatic alcoholic cirrosis, acute GI bleed, ulcerative colitis      Renal/Genitourinary:     (+) chronic renal disease, type: CRI, Pt does not require dialysis, Pt has no history of transplant,       Endo:     (+) Obesity, .      Psychiatric:         Infectious Disease:         Malignancy:   (+) Malignancy History of Other  Other CA AML Remission status post Chemo         Other:                     Physical Exam  Normal systems: cardiovascular, pulmonary and dental    Airway   Mallampati: II  TM distance: >3 FB  Neck ROM: full    Dental     Cardiovascular       Pulmonary                     Anesthesia  Plan      History & Physical Review  History and physical reviewed and following examination; no interval change.    ASA Status:  3 emergent.    NPO Status:  > 8 hours    Plan for MAC Maintenance will be Balanced.  Reason for MAC:  Deep or markedly invasive procedure (G8)         Postoperative Care      Consents  Anesthetic plan, risks, benefits and alternatives discussed with:  Patient..                          .

## 2018-06-08 ENCOUNTER — ONCOLOGY VISIT (OUTPATIENT)
Dept: ONCOLOGY | Facility: CLINIC | Age: 58
End: 2018-06-08
Attending: INTERNAL MEDICINE
Payer: COMMERCIAL

## 2018-06-08 ENCOUNTER — HOSPITAL ENCOUNTER (OUTPATIENT)
Dept: LAB | Facility: CLINIC | Age: 58
Discharge: HOME | End: 2018-06-08
Admitting: INTERNAL MEDICINE
Payer: COMMERCIAL

## 2018-06-08 VITALS
SYSTOLIC BLOOD PRESSURE: 149 MMHG | DIASTOLIC BLOOD PRESSURE: 50 MMHG | OXYGEN SATURATION: 98 % | RESPIRATION RATE: 19 BRPM | TEMPERATURE: 98.6 F | WEIGHT: 184.9 LBS | HEART RATE: 78 BPM | BODY MASS INDEX: 31.74 KG/M2

## 2018-06-08 DIAGNOSIS — I10 ESSENTIAL HYPERTENSION: ICD-10-CM

## 2018-06-08 DIAGNOSIS — D64.9 NORMOCYTIC ANEMIA: ICD-10-CM

## 2018-06-08 DIAGNOSIS — C92.01 ACUTE MYELOID LEUKEMIA IN REMISSION (H): Primary | ICD-10-CM

## 2018-06-08 DIAGNOSIS — D69.6 THROMBOCYTOPENIA (H): ICD-10-CM

## 2018-06-08 DIAGNOSIS — K51.00 UNIVERSAL ULCERATIVE (CHRONIC) COLITIS(556.6) (H): ICD-10-CM

## 2018-06-08 DIAGNOSIS — N18.30 CKD (CHRONIC KIDNEY DISEASE) STAGE 3, GFR 30-59 ML/MIN (H): ICD-10-CM

## 2018-06-08 DIAGNOSIS — K70.30 ALCOHOLIC CIRRHOSIS OF LIVER WITHOUT ASCITES (H): ICD-10-CM

## 2018-06-08 LAB
ALBUMIN UR-MCNC: NEGATIVE MG/DL
APPEARANCE UR: ABNORMAL
BACTERIA #/AREA URNS HPF: ABNORMAL /HPF
BILIRUB UR QL STRIP: NEGATIVE
COLOR UR AUTO: ABNORMAL
GLUCOSE UR STRIP-MCNC: NEGATIVE MG/DL
HGB UR QL STRIP: ABNORMAL
KETONES UR STRIP-MCNC: NEGATIVE MG/DL
LEUKOCYTE ESTERASE UR QL STRIP: ABNORMAL
NITRATE UR QL: NEGATIVE
PH UR STRIP: 6 PH (ref 5–7)
RBC #/AREA URNS AUTO: 10 /HPF (ref 0–2)
SOURCE: ABNORMAL
SP GR UR STRIP: 1 (ref 1–1.03)
SQUAMOUS #/AREA URNS AUTO: <1 /HPF (ref 0–1)
UROBILINOGEN UR STRIP-MCNC: 0 MG/DL (ref 0–2)
WBC #/AREA URNS AUTO: >182 /HPF (ref 0–5)
WBC CLUMPS #/AREA URNS HPF: PRESENT /HPF

## 2018-06-08 PROCEDURE — G0463 HOSPITAL OUTPT CLINIC VISIT: HCPCS

## 2018-06-08 PROCEDURE — 81001 URINALYSIS AUTO W/SCOPE: CPT | Performed by: INTERNAL MEDICINE

## 2018-06-08 PROCEDURE — 99214 OFFICE O/P EST MOD 30 MIN: CPT | Performed by: INTERNAL MEDICINE

## 2018-06-08 RX ORDER — CIPROFLOXACIN 250 MG/1
250 TABLET, FILM COATED ORAL 2 TIMES DAILY
Qty: 14 TABLET | Refills: 0 | Status: SHIPPED | OUTPATIENT
Start: 2018-06-08 | End: 2018-08-02

## 2018-06-08 ASSESSMENT — PAIN SCALES - GENERAL: PAINLEVEL: NO PAIN (0)

## 2018-06-08 NOTE — PROGRESS NOTES
Reviewed results and recommendations with patient.  Denies questions or concerns.  He will  this prescription today.

## 2018-06-08 NOTE — MR AVS SNAPSHOT
After Visit Summary   6/8/2018    Abhijeet Mccauley    MRN: [id number]           Patient Information     Date Of Birth          1960        Visit Information        Provider Department      6/8/2018 2:15 PM Dangelo Rowe MD [facility] ONCOLOGY      Today's Diagnoses     Acute myeloid leukemia in remission (H)    -  1    Universal ulcerative (chronic) colitis(556.6) (H)        Essential hypertension        CKD (chronic kidney disease) stage 3, GFR 30-59 ml/min        Alcoholic cirrhosis of liver without ascites (H)        Normocytic anemia        Thrombocytopenia (H)          Care Instructions    Dr. Rowe recommends you have iron infusions and keep your bone marrow biopsy (bmbx) as scheduled. He is also referring you to GI for liver cirrhosis. We will call you with the results of your urine test. We would like to see you back in clinic with Dr. Rowe with bmbx results.      Copy of appointments, and after visit summary (AVS) given to patient.      If you have any questions during business hours (M-F 8 AM- 4PM), please call Jennifer Sparks RN, BSN, OCN Oncology Hematology /Breast Cancer Navigator at [facility] [phone].       For questions after business hours, or on holidays/weekends, please call our after hours Nurse Triage line [phone]. Thank you.            Follow-ups after your visit        Additional Services     GASTROENTEROLOGY ADULT REF CONSULT ONLY       Preferred Location: patient preference      Please be aware that coverage of these services is subject to the terms and limitations of your health insurance plan.  Call member services at your health plan with any benefit or coverage questions.  Any procedures must be performed at a [facility] facility OR coordinated by your clinic's referral office.    Please bring the following with you to your appointment:    (1) Any X-Rays, CTs or MRIs which have been performed.   Contact the facility where they were done to arrange for  prior to your scheduled appointment.    (2) List of current medications   (3) This referral request   (4) Any documents/labs given to you for this referral                  Your next 10 appointments already scheduled     Jun 12, 2018   Procedure with Demar Sapp MD   [facility] ([facility])    [address] [address]   [phone]           The medical center is located at [address] (between [address] in [place], [place] north of [place]).            Dimitris 15, 2018  2:00 PM CDT   Level 1 with ROOM 6 [facility] ([facility])    [facility]  [address]  [facility] [place] [address]   [phone]            Jun 22, 2018  2:30 PM CDT   Return Visit with Dangelo Rowe MD   [facility] ([facility])    [facility]  [address]  [facility] [place] [address]   [phone]            Jun 22, 2018  3:00 PM CDT   Level 1 with ROOM 6 [facility] ([facility])    [facility]  [address]  [facility] [place] [place] [address]   [phone]            Aug 02, 2018  1:00 PM CDT   New Visit with Landen Quiñones MD   [facility] [place] ([facility])    [address]  [address]  [facility] [place] [place] [address]   [phone]              Who to contact     If you have questions or need follow up information about today's clinic visit or your schedule please contact [facility] directly at [phone].  Normal or non-critical lab and imaging results will be communicated to you by MyChart, letter or phone within 4 business days after the clinic has received the results. If you do not hear from us within 7 days, please contact the clinic through MyChart or  phone. If you have a critical or abnormal lab result, we will notify you by phone as soon as possible.  Submit refill requests through [facility] or call your pharmacy and they will forward the refill request to us. Please allow 3 business days for your refill to be completed.          Additional Information About Your Visit        Care EveryWhere ID     This is your Care EveryWhere ID. This could be used by other organizations to access your [place] medical records  [id number]        Your Vitals Were     Pulse Temperature Respirations Pulse Oximetry BMI (Body Mass Index)       78 98.6  F (37  C) (Axillary) 19 98% 31.74 kg/m2        Blood Pressure from Last 3 Encounters:   06/08/18 149/50   05/31/18 148/75   05/18/18 134/60    Weight from Last 3 Encounters:   06/08/18 83.9 kg (184 lb 14.4 oz)   05/31/18 83.5 kg (184 lb)   05/24/18 (P) 83 kg (183 lb)              We Performed the Following     GASTROENTEROLOGY ADULT REF CONSULT ONLY        Primary Care Provider Office Phone # Fax #    Parrish Funk [phone] [address] [address]        Equal Access to Services     BERTO COOL : Hadii sameera mcknight hadmitchelo Soemily, waaxda luqadaha, qaybta kaalmada adedelroyyada, song ureña. So [facility] [phone].    ATENCIÓN: Si habla español, tiene a uriostegui disposición servicios gratuitos de asistencia lingüística. [facility] [phone].    We comply with applicable federal civil rights laws and [place] laws. We do not discriminate on the basis of race, color, national origin, age, disability, sex, sexual orientation, or gender identity.            Thank you!     Thank you for choosing [facility]  for your care. Our goal is always to provide you with excellent care. Hearing back from our patients is one way we can continue to improve our services. Please take a few minutes to complete the written survey that you may receive in the mail after your visit with  us. Thank you!             Your Updated Medication List - Protect others around you: Learn how to safely use, store and throw away your medicines at www.disposemymeds.org.          This list is accurate as of 6/8/18  2:58 PM.  Always use your most recent med list.                   Brand Name Dispense Instructions for use Diagnosis    acetaminophen 500 MG tablet    TYLENOL     Take 1-2 tablets by mouth every 6 hours as needed.        albuterol 108 (90 Base) MCG/ACT Inhaler    PROAIR HFA/PROVENTIL HFA/VENTOLIN HFA    1 Inhaler    Inhale 2 puffs into the lungs every 4 hours as needed    Mild intermittent asthma without complication       desonide 0.05 % lotion    DESOWEN    118 mL    Apply topically as needed    Rosacea       furosemide 20 MG tablet    LASIX    30 tablet    Take 1 tablet (20 mg) by mouth every morning    Osteoarthritis of spine without myelopathy or radiculopathy, sacral and sacrococcygeal region       ibuprofen 400 MG tablet    ADVIL/MOTRIN    30 tablet    Take 1 tablet (400 mg) by mouth every 6 hours as needed for moderate pain    Osteoarthritis of spine without myelopathy or radiculopathy, sacral and sacrococcygeal region       loratadine 10 MG tablet    CLARITIN    30 tablet    Take 1 tablet by mouth daily as needed for allergies.    Allergic rhinitis       Melatonin ER 5 MG Tbcr     30 tablet    Take 1 tablet by mouth At Bedtime    Gastroesophageal reflux disease without esophagitis       mometasone-formoterol 200-5 MCG/ACT oral inhaler    DULERA     Inhale 2 puffs into the lungs 2 times daily        PANTOPRAZOLE SODIUM PO      Take 40 mg by mouth every morning (before breakfast)        potassium chloride SA 10 MEQ CR tablet    K-DUR/KLOR-CON M    90 tablet    Take 1 tablet (10 mEq) by mouth daily    Gastroesophageal reflux disease without esophagitis       propranolol 20 MG tablet    INDERAL    90 tablet    Take 1 tablet (20 mg) by mouth 2 times daily    Gastroesophageal reflux disease without  esophagitis       SODIUM BICARBONATE PO      Take 650 mg by mouth daily as needed        tiotropium 18 MCG capsule    SPIRIVA     Inhale 1 capsule into the lungs daily Using PRN        traZODone 50 MG tablet    DESYREL    90 tablet    Take 1 tablet (50 mg) by mouth At Bedtime    Osteoarthritis of spine without myelopathy or radiculopathy, sacral and sacrococcygeal region

## 2018-06-08 NOTE — PROGRESS NOTES
Please inform the patient.  Cipro 250 mg twice daily for 1 week.  Prescription sent to the patient's pharmacy

## 2018-06-08 NOTE — NURSING NOTE
"Oncology Rooming Note    June 8, 2018 2:22 PM   Abhijeet Mccauley is a 57 year old male who presents for:    Chief Complaint   Patient presents with     Oncology Clinic Visit     1 week follow up acute myeloid leukemia. Review lab and stool results.      Initial Vitals: /50 (BP Location: Right arm, Patient Position: Sitting, Cuff Size: Adult Large)  Pulse 78  Temp 98.6  F (37  C) (Axillary)  Resp 19  Wt 83.9 kg (184 lb 14.4 oz)  SpO2 98%  BMI 31.74 kg/m2 Estimated body mass index is 31.74 kg/(m^2) as calculated from the following:    Height as of 5/31/18: 1.626 m (5' 4\").    Weight as of this encounter: 83.9 kg (184 lb 14.4 oz). Body surface area is 1.95 meters squared.  No Pain (0) Comment: Data Unavailable   No LMP for male patient.  Allergies reviewed: Yes  Medications reviewed: Yes    Medications: Medication refills not needed today.  Pharmacy name entered into [facility]:    [facility] - [place] - [address]  [facility] [place], [place] - [address]    Clinical concerns: 1 week f/u, hx of AML. Pt has blood in urine this week, UA was just sent.    8 minutes for nursing intake (face to face time)     Jenise Regan RN              "

## 2018-06-08 NOTE — PROGRESS NOTES
DATE OF VISIT: Jun 8, 2018    Abhijeet Mccauley is a 57 year old male is seen today for   Chief Complaint   Patient presents with     Oncology Clinic Visit     1 week follow up acute myeloid leukemia. Review lab and stool results.    .       (C92.01) Acute myeloid leukemia in remission (H)  (primary encounter diagnosis)  I will arrange for a bone marrow biopsy next week.    (I10) Essential hypertension  The patient currently on propranolol 20 mg orally daily.  Also on Lasix 20 mg orally daily.    (N18.3) CKD (chronic kidney disease) stage 3, GFR 30-59 ml/min  Patient has been stable.    (K70.30) Alcoholic cirrhosis of liver without ascites (H)  Recommend patient to be seen by gastroenterology.  I strongly advised the patient abstaining of alcohol use.    (D64.9) Normocytic anemia  His ferritin has been low.  He is unable to take oral iron because of side effects.  We will arrange for parenteral iron.  Discussed with the patient potential side effects of parenteral iron in details.*    The patient is ready to learn, no apparent learning barriers were identified.  Diagnosis and treatment plans were explained to the patient. The patient expressed understanding of the content. The patient asked appropriate questions. The patient questions were answered to his satisfaction.  Time spent 25 minutes more than 50% of the time in counseling and coordination of care including discussion of management of anemia follow-up of leukemia.  Chart documentation with Dragon Voice recognition Software. Although reviewed after completion, some words and grammatical errors may remain.

## 2018-06-08 NOTE — LETTER
6/8/2018         RE: Abhijeet Mccauley  [address] [place] [id number]        Dear Colleague,    Thank you for referring your patient, Abhijeet Mccauley, to the [facility]. Please see a copy of my visit note below.    DATE OF VISIT: Jun 8, 2018    Abhijeet Mccauley is a 57 year old male is seen today for   Chief Complaint   Patient presents with     Oncology Clinic Visit     1 week follow up acute myeloid leukemia. Review lab and stool results.    .       (C92.01) Acute myeloid leukemia in remission (H)  (primary encounter diagnosis)  I will arrange for a bone marrow biopsy next week.    (I10) Essential hypertension  The patient currently on propranolol 20 mg orally daily.  Also on Lasix 20 mg orally daily.    (N18.3) CKD (chronic kidney disease) stage 3, GFR 30-59 ml/min  Patient has been stable.    (K70.30) Alcoholic cirrhosis of liver without ascites (H)  Recommend patient to be seen by gastroenterology.  I strongly advised the patient abstaining of alcohol use.    (D64.9) Normocytic anemia  His ferritin has been low.  He is unable to take oral iron because of side effects.  We will arrange for parenteral iron.  Discussed with the patient potential side effects of parenteral iron in details.*    The patient is ready to learn, no apparent learning barriers were identified.  Diagnosis and treatment plans were explained to the patient. The patient expressed understanding of the content. The patient asked appropriate questions. The patient questions were answered to his satisfaction.  Time spent 25 minutes more than 50% of the time in counseling and coordination of care including discussion of management of anemia follow-up of leukemia.  Chart documentation with Dragon Voice recognition Software. Although reviewed after completion, some words and grammatical errors may remain.    Again, thank you for allowing me to participate in the care of your patient.        Sincerely,        Dangelo Rowe,  MD

## 2018-06-08 NOTE — PATIENT INSTRUCTIONS
Dr. Rowe recommends you have iron infusions and keep your bone marrow biopsy (bmbx) as scheduled. He is also referring you to GI for liver cirrhosis. We will call you with the results of your urine test. We would like to see you back in clinic with Dr. Rowe with bmbx results.      Copy of appointments, and after visit summary (AVS) given to patient.      If you have any questions during business hours (M-F 8 AM- 4PM), please call Jennifer Sparks RN, BSN, OCN Oncology Hematology /Breast Cancer Navigator at [facility] [phone].       For questions after business hours, or on holidays/weekends, please call our after hours Nurse Triage line [phone]. Thank you.

## 2018-06-12 ENCOUNTER — ANESTHESIA (OUTPATIENT)
Dept: GASTROENTEROLOGY | Facility: CLINIC | Age: 58
End: 2018-06-12
Payer: COMMERCIAL

## 2018-06-12 ENCOUNTER — HOSPITAL ENCOUNTER (OUTPATIENT)
Facility: CLINIC | Age: 58
Discharge: HOME | End: 2018-06-12
Attending: PATHOLOGY | Admitting: PATHOLOGY
Payer: COMMERCIAL

## 2018-06-12 ENCOUNTER — SURGERY (OUTPATIENT)
Age: 58
End: 2018-06-12

## 2018-06-12 VITALS
WEIGHT: 184 LBS | SYSTOLIC BLOOD PRESSURE: 104 MMHG | RESPIRATION RATE: 16 BRPM | TEMPERATURE: 98.1 F | OXYGEN SATURATION: 98 % | BODY MASS INDEX: 31.41 KG/M2 | DIASTOLIC BLOOD PRESSURE: 59 MMHG | HEIGHT: 64 IN

## 2018-06-12 DIAGNOSIS — D69.6 THROMBOCYTOPENIA (H): Primary | ICD-10-CM

## 2018-06-12 PROCEDURE — 88184 FLOWCYTOMETRY/ TC 1 MARKER: CPT | Performed by: PATHOLOGY

## 2018-06-12 PROCEDURE — 88311 DECALCIFY TISSUE: CPT | Performed by: INTERNAL MEDICINE

## 2018-06-12 PROCEDURE — 88280 CHROMOSOME KARYOTYPE STUDY: CPT | Performed by: PATHOLOGY

## 2018-06-12 PROCEDURE — 88237 TISSUE CULTURE BONE MARROW: CPT | Performed by: PATHOLOGY

## 2018-06-12 PROCEDURE — 88305 TISSUE EXAM BY PATHOLOGIST: CPT | Performed by: INTERNAL MEDICINE

## 2018-06-12 PROCEDURE — 25000125 ZZHC RX 250: Performed by: NURSE ANESTHETIST, CERTIFIED REGISTERED

## 2018-06-12 PROCEDURE — 00000058 ZZHCL STATISTIC BONE MARROW ASP PERF TC 38220: Performed by: INTERNAL MEDICINE

## 2018-06-12 PROCEDURE — 88313 SPECIAL STAINS GROUP 2: CPT | Performed by: INTERNAL MEDICINE

## 2018-06-12 PROCEDURE — 88342 IMHCHEM/IMCYTCHM 1ST ANTB: CPT | Mod: 26 | Performed by: INTERNAL MEDICINE

## 2018-06-12 PROCEDURE — 88264 CHROMOSOME ANALYSIS 20-25: CPT | Performed by: PATHOLOGY

## 2018-06-12 PROCEDURE — 85097 BONE MARROW INTERPRETATION: CPT | Performed by: INTERNAL MEDICINE

## 2018-06-12 PROCEDURE — 88341 IMHCHEM/IMCYTCHM EA ADD ANTB: CPT | Performed by: INTERNAL MEDICINE

## 2018-06-12 PROCEDURE — 38222 DX BONE MARROW BX & ASPIR: CPT | Performed by: INTERNAL MEDICINE

## 2018-06-12 PROCEDURE — 88161 CYTOPATH SMEAR OTHER SOURCE: CPT | Mod: 26 | Performed by: INTERNAL MEDICINE

## 2018-06-12 PROCEDURE — 88185 FLOWCYTOMETRY/TC ADD-ON: CPT | Performed by: PATHOLOGY

## 2018-06-12 PROCEDURE — 88341 IMHCHEM/IMCYTCHM EA ADD ANTB: CPT | Mod: 26 | Performed by: INTERNAL MEDICINE

## 2018-06-12 PROCEDURE — 88161 CYTOPATH SMEAR OTHER SOURCE: CPT | Performed by: INTERNAL MEDICINE

## 2018-06-12 PROCEDURE — 88305 TISSUE EXAM BY PATHOLOGIST: CPT | Mod: 26 | Performed by: INTERNAL MEDICINE

## 2018-06-12 PROCEDURE — 40000424 ZZHCL STATISTIC BONE MARROW CORE PERF TC 38221: Performed by: INTERNAL MEDICINE

## 2018-06-12 PROCEDURE — 88313 SPECIAL STAINS GROUP 2: CPT | Mod: 26 | Performed by: INTERNAL MEDICINE

## 2018-06-12 PROCEDURE — 00000008 ZZHCL STATISTIC ADDL BM ASP PERF PF 38220: Performed by: INTERNAL MEDICINE

## 2018-06-12 PROCEDURE — 40000948 ZZHCL STATISTIC BONE MARROW ASP TC 85097: Performed by: INTERNAL MEDICINE

## 2018-06-12 PROCEDURE — 38221 DX BONE MARROW BIOPSIES: CPT | Performed by: PATHOLOGY

## 2018-06-12 PROCEDURE — 25000128 H RX IP 250 OP 636: Performed by: NURSE ANESTHETIST, CERTIFIED REGISTERED

## 2018-06-12 PROCEDURE — 40000847 ZZHCL STATISTIC MORPHOLOGY W/INTERP HISTOLOGY TC 85060: Performed by: INTERNAL MEDICINE

## 2018-06-12 PROCEDURE — 88311 DECALCIFY TISSUE: CPT | Mod: 26 | Performed by: INTERNAL MEDICINE

## 2018-06-12 PROCEDURE — 38221 DX BONE MARROW BIOPSIES: CPT | Performed by: INTERNAL MEDICINE

## 2018-06-12 PROCEDURE — 38222 DX BONE MARROW BX & ASPIR: CPT | Performed by: PATHOLOGY

## 2018-06-12 PROCEDURE — 85060 BLOOD SMEAR INTERPRETATION: CPT | Performed by: INTERNAL MEDICINE

## 2018-06-12 PROCEDURE — 37000008 ZZH ANESTHESIA TECHNICAL FEE, 1ST 30 MIN: Performed by: PATHOLOGY

## 2018-06-12 PROCEDURE — 25000125 ZZHC RX 250: Performed by: PATHOLOGY

## 2018-06-12 PROCEDURE — 88342 IMHCHEM/IMCYTCHM 1ST ANTB: CPT | Performed by: INTERNAL MEDICINE

## 2018-06-12 PROCEDURE — 40001005 ZZHCL STATISTIC FLOW >15 ABY TC 88189: Performed by: PATHOLOGY

## 2018-06-12 RX ORDER — ALBUTEROL SULFATE 0.83 MG/ML
2.5 SOLUTION RESPIRATORY (INHALATION) EVERY 4 HOURS PRN
Status: DISCONTINUED | OUTPATIENT
Start: 2018-06-12 | End: 2018-06-12 | Stop reason: HOSPADM

## 2018-06-12 RX ORDER — FENTANYL CITRATE 50 UG/ML
25-50 INJECTION, SOLUTION INTRAMUSCULAR; INTRAVENOUS
Status: DISCONTINUED | OUTPATIENT
Start: 2018-06-12 | End: 2018-06-12 | Stop reason: HOSPADM

## 2018-06-12 RX ORDER — NALOXONE HYDROCHLORIDE 0.4 MG/ML
.1-.4 INJECTION, SOLUTION INTRAMUSCULAR; INTRAVENOUS; SUBCUTANEOUS
Status: DISCONTINUED | OUTPATIENT
Start: 2018-06-12 | End: 2018-06-12 | Stop reason: HOSPADM

## 2018-06-12 RX ORDER — LIDOCAINE HYDROCHLORIDE 10 MG/ML
INJECTION, SOLUTION INFILTRATION; PERINEURAL PRN
Status: DISCONTINUED | OUTPATIENT
Start: 2018-06-12 | End: 2018-06-12 | Stop reason: HOSPADM

## 2018-06-12 RX ORDER — PROPOFOL 10 MG/ML
INJECTION, EMULSION INTRAVENOUS CONTINUOUS PRN
Status: DISCONTINUED | OUTPATIENT
Start: 2018-06-12 | End: 2018-06-12

## 2018-06-12 RX ORDER — PROPOFOL 10 MG/ML
INJECTION, EMULSION INTRAVENOUS PRN
Status: DISCONTINUED | OUTPATIENT
Start: 2018-06-12 | End: 2018-06-12

## 2018-06-12 RX ORDER — SODIUM CHLORIDE, SODIUM LACTATE, POTASSIUM CHLORIDE, CALCIUM CHLORIDE 600; 310; 30; 20 MG/100ML; MG/100ML; MG/100ML; MG/100ML
INJECTION, SOLUTION INTRAVENOUS CONTINUOUS
Status: DISCONTINUED | OUTPATIENT
Start: 2018-06-12 | End: 2018-06-12 | Stop reason: HOSPADM

## 2018-06-12 RX ORDER — GLYCOPYRROLATE 0.2 MG/ML
INJECTION, SOLUTION INTRAMUSCULAR; INTRAVENOUS PRN
Status: DISCONTINUED | OUTPATIENT
Start: 2018-06-12 | End: 2018-06-12

## 2018-06-12 RX ORDER — LIDOCAINE 40 MG/G
CREAM TOPICAL
Status: DISCONTINUED | OUTPATIENT
Start: 2018-06-12 | End: 2018-06-12 | Stop reason: HOSPADM

## 2018-06-12 RX ADMIN — SODIUM CHLORIDE, POTASSIUM CHLORIDE, SODIUM LACTATE AND CALCIUM CHLORIDE: 600; 310; 30; 20 INJECTION, SOLUTION INTRAVENOUS at 07:13

## 2018-06-12 RX ADMIN — PROPOFOL 200 MCG/KG/MIN: 10 INJECTION, EMULSION INTRAVENOUS at 07:35

## 2018-06-12 RX ADMIN — LIDOCAINE HYDROCHLORIDE 5 ML: 10 INJECTION, SOLUTION INFILTRATION; PERINEURAL at 07:39

## 2018-06-12 RX ADMIN — LIDOCAINE HYDROCHLORIDE 50 MG: 10 INJECTION, SOLUTION EPIDURAL; INFILTRATION; INTRACAUDAL; PERINEURAL at 07:34

## 2018-06-12 RX ADMIN — LIDOCAINE HYDROCHLORIDE 0.2 ML: 10 INJECTION, SOLUTION EPIDURAL; INFILTRATION; INTRACAUDAL; PERINEURAL at 07:13

## 2018-06-12 RX ADMIN — PROPOFOL 100 MG: 10 INJECTION, EMULSION INTRAVENOUS at 07:35

## 2018-06-12 RX ADMIN — GLYCOPYRROLATE 0.2 MG: 0.2 INJECTION, SOLUTION INTRAMUSCULAR; INTRAVENOUS at 07:34

## 2018-06-12 NOTE — OP NOTE
Bone Marrow Procedure Note    Date: 6/12/2018  Diagnosis or Indication: Acute myeloid leukemia in remission  Location: [facility]     Premedication per physician order.    Patients identification was positively verified by: identification band and verbal assent (name and date of birth). After informed consent was obtained (see the completed Affirmation of Consent for Bone Marrow Aspiration and/or Biopsy procedures form in the patient's chart), the patient was placed in the prone position and the bony landmarks of the pelvis were identified.     Medical staff reconfirmed the patient's name, date of birth, procedure, and procedure site marking(s).  Medication was administered.(See Anesthesia documentation). The skin surface(s) over the posterior iliac crest(s) was scrubbed with Betadine and draped in a sterile fashion with sterile towels to create a sterile field.  The skin and periosteal surface(s) of the Right posterior iliac crest (RPIC) were anesthetized with a total of 3.0 mL of 1% Lidocaine and a small incision(s) was made through the skin over the corresponding site(s) with a scalpel.     Trephine bone marrow core(s) was obtained from the RPIC (# 1). Bone marrow aspirates were obtained from the RPIC (# 2) for morphology, immunophenotyping and cytogenetics.    Direct pressure was applied to the biopsy site(s) with sterile gauze. The biopsy site(s) was cleaned with alcohol and sterile dressing(s) was placed over the biopsy incision(s) using a pressure bandage. The patient was then placed in the supine position to maintain pressure on the biopsy site.  The patient's bed/examination table was lowered (if possible) and the railings were raised (if present).  Post-procedure wound care instructions, including routin dressing instructions and analgesia, were given to the spouse.     The patient tolerated the procedure well with no discomfort. Complications: None.    Demar Sapp MD

## 2018-06-12 NOTE — ANESTHESIA CARE TRANSFER NOTE
Patient: Abhijeet Mccauley    Procedure(s):  Bone Marrow Biopsy - Wound Class: I-Clean    Diagnosis: pancytopenia  Diagnosis Additional Information: No value filed.    Anesthesia Type:   No value filed.     Note:  Airway :Room Air  Patient transferred to:Phase II  Handoff Report: Identifed the Patient, Identified the Reponsible Provider, Reviewed the pertinent medical history, Discussed the surgical course, Reviewed Intra-OP anesthesia mangement and issues during anesthesia, Set expectations for post-procedure period and Allowed opportunity for questions and acknowledgement of understanding      Vitals: (Last set prior to Anesthesia Care Transfer)    CRNA VITALS  6/12/2018 0720 - 6/12/2018 0750      6/12/2018             Pulse: 77    SpO2: 100 %                Electronically Signed By: LOPEZ Bryson CRNA  June 12, 2018  7:50 AM

## 2018-06-12 NOTE — ADDENDUM NOTE
Addendum  created 06/12/18 0812 by Kristopher Riley APRN CRNA    Anesthesia Intra Flowsheets edited

## 2018-06-12 NOTE — IP AVS SNAPSHOT
MRN:[id number]                      After Visit Summary   6/12/2018    Abhijeet Mccauley    MRN: [id number]           Thank you!     Thank you for choosing [facility] for your care. Our goal is always to provide you with excellent care. Hearing back from our patients is one way we can continue to improve our services. Please take a few minutes to complete the written survey that you may receive in the mail after you visit with us. Thank you!        Patient Information     Date Of Birth          1960        About your hospital stay     You were admitted on:  June 12, 2018 You last received care in the:  [facility]    You were discharged on:  June 12, 2018       Who to Call     For medical emergencies, please call 911.  For non-urgent questions about your medical care, please call your primary care provider or clinic, [phone]  For questions related to your surgery, please call your surgery clinic        Attending Provider     Provider Specialty    Demar Sapp MD Pathology       Primary Care Provider Office Phone # Fax #    [place] Kristopher Funk [phone]      Your next 10 appointments already scheduled     Dimitris 15, 2018  2:00 PM CDT   Level 1 with ROOM 6 [facility] ([facility])    [facility]  [address]  [facility] [address]   [phone]            Jun 22, 2018  2:30 PM CDT   Return Visit with Dangelo Rowe MD   [facility] ([facility])    [facility]  [address]  [facility] [address]   [phone]            Jun 22, 2018  3:00 PM CDT   Level 1 with ROOM 6 [facility] ([facility])    [facility]  [address] [address]  [facility] [address]   [phone]            Aug 02, 2018  1:00 PM CDT   New Visit with Landen Quiñones MD   [facility] [place] ([facility])    [address]  [address]  [facility] [address] [phone]              Further instructions from your care team       DISCHARGE INSTRUCTIONS  Bone Marrow Biopsy      IMPORTANT: As you prepare for discharge, the following information will help you return to your best level of health.       BONE MARROW BIOPSY  Your bone marrow was taken out through the hip bone and the site may be tender.  You may have a brief period of amnesia and may not remember the biopsy so you should have someone to drive you home.  You may also have a headache, nausea (feeling sick to your stomach) and vomiting (throwing up).    Your oncologist will discuss the results with you when they are read.  This is usually with a week after the procedure, but could be up to 2-3 weeks for special tests.    Do the following:    Rest today and slowly increase your normal diet and activities as you feel able.    Do not drive or use heavy machinery for 24 hours.    Keep the bandage dry and in place for 24 hours. After that, you may remove it and take a shower. You may see some bruising at the needle site.    Avoid heavy exercise and activity for 2-3 days.    Take all medicines exactly as prescribed.    Call your doctor if you have:    Increased bleeding from the site of the biopsy.    Increased drainage, redness, or swelling from the wound.    Red streaks from the wound.    Foul odor or pus from the dressing or wound.    Chills or fever over 101 degrees (by mouth).    If you have any new problems, concerns, or if you do not get better. Call sooner if you feel worse.                        If you have question or need to speak to a doctor, call the Oncology Clinic at: [phone]      Pending Results     Date and Time Order Name Status Description    6/12/2018 0744 Leukemia Lymphoma Evaluation (Flow Cytometry) In process     6/12/2018 0744 Chromosome bone marrow In process   "   6/12/2018 0701 Bone marrow biopsy In process             Admission Information     Date & Time Provider Department Dept. Phone    6/12/2018 Demar Sapp MD [facility] [phone]      Your Vitals Were     Blood Pressure Temperature Respirations Height Weight Pulse Oximetry    104/59 98.1  F (36.7  C) (Oral) 16 1.626 m (5' 4\") 83.5 kg (184 lb) 98%    BMI (Body Mass Index)                   31.58 kg/m2           Care EveryWhere ID     This is your Care EveryWhere ID. This could be used by other organizations to access your [facility] medical records  [id number]        Equal Access to Services     JORGE COOL : Ashwin Huddleston, maritza underwood, randy kaalmada irlanda, song ureña. So [facility] [phone].    ATENCIÓN: Si habla español, tiene a uriostegui disposición servicios gratuitos de asistencia lingüística. Llame al [phone].    We comply with applicable federal civil rights laws and [place] laws. We do not discriminate on the basis of race, color, national origin, age, disability, sex, sexual orientation, or gender identity.               Review of your medicines      UNREVIEWED medicines. Ask your doctor about these medicines        Dose / Directions    acetaminophen 500 MG tablet   Commonly known as:  TYLENOL        Dose:  1-2 tablet   Take 1-2 tablets by mouth every 6 hours as needed.   Refills:  0       albuterol 108 (90 Base) MCG/ACT Inhaler   Commonly known as:  PROAIR HFA/PROVENTIL HFA/VENTOLIN HFA   Used for:  Mild intermittent asthma without complication        Dose:  2 puff   Inhale 2 puffs into the lungs every 4 hours as needed   Quantity:  1 Inhaler   Refills:  11       ciprofloxacin 250 MG tablet   Commonly known as:  CIPRO   Used for:  Acute myeloid leukemia in remission (H)        Dose:  250 mg   Take 1 tablet (250 mg) by mouth 2 times daily   Quantity:  14 tablet   Refills:  0       desonide 0.05 % lotion   Commonly " known as:  DESOWEN   Used for:  Rosacea        Apply topically as needed   Quantity:  118 mL   Refills:  1       furosemide 20 MG tablet   Commonly known as:  LASIX   Used for:  Osteoarthritis of spine without myelopathy or radiculopathy, sacral and sacrococcygeal region        Dose:  20 mg   Take 1 tablet (20 mg) by mouth every morning   Quantity:  30 tablet   Refills:  11       ibuprofen 400 MG tablet   Commonly known as:  ADVIL/MOTRIN   Used for:  Osteoarthritis of spine without myelopathy or radiculopathy, sacral and sacrococcygeal region        Dose:  400 mg   Take 1 tablet (400 mg) by mouth every 6 hours as needed for moderate pain   Quantity:  30 tablet   Refills:  5       loratadine 10 MG tablet   Commonly known as:  CLARITIN   Used for:  Allergic rhinitis        Dose:  10 mg   Take 1 tablet by mouth daily as needed for allergies.   Quantity:  30 tablet   Refills:  6       Melatonin ER 5 MG Tbcr   Used for:  Gastroesophageal reflux disease without esophagitis        Dose:  1 tablet   Take 1 tablet by mouth At Bedtime   Quantity:  30 tablet   Refills:  11       mometasone-formoterol 200-5 MCG/ACT oral inhaler   Commonly known as:  DULERA        Dose:  2 puff   Inhale 2 puffs into the lungs 2 times daily   Refills:  0       PANTOPRAZOLE SODIUM PO        Dose:  40 mg   Take 40 mg by mouth every morning (before breakfast)   Refills:  0       potassium chloride SA 10 MEQ CR tablet   Commonly known as:  K-DUR/KLOR-CON M   Used for:  Gastroesophageal reflux disease without esophagitis        Dose:  10 mEq   Take 1 tablet (10 mEq) by mouth daily   Quantity:  90 tablet   Refills:  3       propranolol 20 MG tablet   Commonly known as:  INDERAL   Used for:  Gastroesophageal reflux disease without esophagitis        Dose:  20 mg   Take 1 tablet (20 mg) by mouth 2 times daily   Quantity:  90 tablet   Refills:  3       SODIUM BICARBONATE PO        Dose:  650 mg   Take 650 mg by mouth daily as needed   Refills:  0        tiotropium 18 MCG capsule   Commonly known as:  SPIRIVA        Dose:  1 capsule   Inhale 1 capsule into the lungs daily Using PRN   Refills:  0       traZODone 50 MG tablet   Commonly known as:  DESYREL   Used for:  Osteoarthritis of spine without myelopathy or radiculopathy, sacral and sacrococcygeal region        Dose:  50 mg   Take 1 tablet (50 mg) by mouth At Bedtime   Quantity:  90 tablet   Refills:  3                Protect others around you: Learn how to safely use, store and throw away your medicines at www.disposemymeds.org.             Medication List: This is a list of all your medications and when to take them. Check marks below indicate your daily home schedule. Keep this list as a reference.      Medications           Morning Afternoon Evening Bedtime As Needed    acetaminophen 500 MG tablet   Commonly known as:  TYLENOL   Take 1-2 tablets by mouth every 6 hours as needed.                                albuterol 108 (90 Base) MCG/ACT Inhaler   Commonly known as:  PROAIR HFA/PROVENTIL HFA/VENTOLIN HFA   Inhale 2 puffs into the lungs every 4 hours as needed                                ciprofloxacin 250 MG tablet   Commonly known as:  CIPRO   Take 1 tablet (250 mg) by mouth 2 times daily                                desonide 0.05 % lotion   Commonly known as:  DESOWEN   Apply topically as needed                                furosemide 20 MG tablet   Commonly known as:  LASIX   Take 1 tablet (20 mg) by mouth every morning                                ibuprofen 400 MG tablet   Commonly known as:  ADVIL/MOTRIN   Take 1 tablet (400 mg) by mouth every 6 hours as needed for moderate pain                                loratadine 10 MG tablet   Commonly known as:  CLARITIN   Take 1 tablet by mouth daily as needed for allergies.                                Melatonin ER 5 MG Tbcr   Take 1 tablet by mouth At Bedtime                                mometasone-formoterol 200-5 MCG/ACT oral inhaler    Commonly known as:  DULERA   Inhale 2 puffs into the lungs 2 times daily                                PANTOPRAZOLE SODIUM PO   Take 40 mg by mouth every morning (before breakfast)                                potassium chloride SA 10 MEQ CR tablet   Commonly known as:  K-DUR/KLOR-CON M   Take 1 tablet (10 mEq) by mouth daily                                propranolol 20 MG tablet   Commonly known as:  INDERAL   Take 1 tablet (20 mg) by mouth 2 times daily                                SODIUM BICARBONATE PO   Take 650 mg by mouth daily as needed                                tiotropium 18 MCG capsule   Commonly known as:  SPIRIVA   Inhale 1 capsule into the lungs daily Using PRN                                traZODone 50 MG tablet   Commonly known as:  DESYREL   Take 1 tablet (50 mg) by mouth At Bedtime

## 2018-06-12 NOTE — ANESTHESIA POSTPROCEDURE EVALUATION
Patient: Abhijeet Mccauley    Procedure(s):  Bone Marrow Biopsy - Wound Class: I-Clean    Diagnosis:pancytopenia  Diagnosis Additional Information: No value filed.    Anesthesia Type:  No value filed.    Note:  Anesthesia Post Evaluation    Patient location during evaluation: Bedside  Patient participation: Able to fully participate in evaluation  Level of consciousness: awake and alert  Pain management: adequate  Airway patency: patent  Cardiovascular status: acceptable  Respiratory status: acceptable  Hydration status: acceptable  PONV: none     Anesthetic complications: None          Last vitals:  Vitals:    06/12/18 0636   BP: 142/69   Resp: 16   Temp: 36.7  C (98.1  F)   SpO2: 98%         Electronically Signed By: LOPEZ Bryson CRNA  June 12, 2018  7:51 AM

## 2018-06-12 NOTE — IP AVS SNAPSHOT
[facility]    [address] [facility] [address] [address]    Phone:  [phone]    Fax:  [phone]                                       After Visit Summary   6/12/2018    Abhijeet Mccauley    MRN: [id number]           After Visit Summary Signature Page     I have received my discharge instructions, and my questions have been answered. I have discussed any challenges I see with this plan with the nurse or doctor.    ..........................................................................................................................................  Patient/Patient Representative Signature      ..........................................................................................................................................  Patient Representative Print Name and Relationship to Patient    ..................................................               ................................................  Date                                            Time    ..........................................................................................................................................  Reviewed by Signature/Title    ...................................................              ..............................................  Date                                                            Time

## 2018-06-12 NOTE — DISCHARGE INSTRUCTIONS
DISCHARGE INSTRUCTIONS  Bone Marrow Biopsy      IMPORTANT: As you prepare for discharge, the following information will help you return to your best level of health.       BONE MARROW BIOPSY  Your bone marrow was taken out through the hip bone and the site may be tender.  You may have a brief period of amnesia and may not remember the biopsy so you should have someone to drive you home.  You may also have a headache, nausea (feeling sick to your stomach) and vomiting (throwing up).    Your oncologist will discuss the results with you when they are read.  This is usually with a week after the procedure, but could be up to 2-3 weeks for special tests.    Do the following:    Rest today and slowly increase your normal diet and activities as you feel able.    Do not drive or use heavy machinery for 24 hours.    Keep the bandage dry and in place for 24 hours. After that, you may remove it and take a shower. You may see some bruising at the needle site.    Avoid heavy exercise and activity for 2-3 days.    Take all medicines exactly as prescribed.    Call your doctor if you have:    Increased bleeding from the site of the biopsy.    Increased drainage, redness, or swelling from the wound.    Red streaks from the wound.    Foul odor or pus from the dressing or wound.    Chills or fever over 101 degrees (by mouth).    If you have any new problems, concerns, or if you do not get better. Call sooner if you feel worse.                        If you have question or need to speak to a doctor, call the Oncology Clinic at: [phone]

## 2018-06-12 NOTE — PROGRESS NOTES
Discharge instructions reviewed by patient and Sister Rowena.  No questions or concerns at this time.

## 2018-06-13 LAB — COPATH REPORT: NORMAL

## 2018-06-14 LAB — COPATH REPORT: NORMAL

## 2018-06-15 ENCOUNTER — INFUSION THERAPY VISIT (OUTPATIENT)
Dept: INFUSION THERAPY | Facility: CLINIC | Age: 58
End: 2018-06-15
Attending: INTERNAL MEDICINE
Payer: COMMERCIAL

## 2018-06-15 VITALS — SYSTOLIC BLOOD PRESSURE: 130 MMHG | HEART RATE: 69 BPM | DIASTOLIC BLOOD PRESSURE: 56 MMHG | RESPIRATION RATE: 16 BRPM

## 2018-06-15 DIAGNOSIS — D64.9 NORMOCYTIC ANEMIA: Primary | ICD-10-CM

## 2018-06-15 PROCEDURE — 25000128 H RX IP 250 OP 636: Performed by: INTERNAL MEDICINE

## 2018-06-15 PROCEDURE — 96374 THER/PROPH/DIAG INJ IV PUSH: CPT

## 2018-06-15 RX ADMIN — FERRIC CARBOXYMALTOSE INJECTION 750 MG: 50 INJECTION, SOLUTION INTRAVENOUS at 14:20

## 2018-06-15 RX ADMIN — SODIUM CHLORIDE 250 ML: 9 INJECTION, SOLUTION INTRAVENOUS at 14:41

## 2018-06-15 ASSESSMENT — PAIN SCALES - GENERAL: PAINLEVEL: WORST PAIN (10)

## 2018-06-15 NOTE — MR AVS SNAPSHOT
After Visit Summary   6/15/2018    Abhijeet Mccauley    MRN: [id number]           Patient Information     Date Of Birth          1960        Visit Information        Provider Department      6/15/2018 2:00 PM ROOM 6 [facility]        Today's Diagnoses     Normocytic anemia    -  1       Follow-ups after your visit        Your next 10 appointments already scheduled     Jun 22, 2018  2:30 PM CDT   Return Visit with Dangelo Rowe MD   [facility] ([facility])    [facility]  [address]  [facility] [address]   [phone]            Jun 22, 2018  3:00 PM CDT   Level 1 with ROOM 6 [facility] ([facility])    [facility]  [address]  [facility] [address]   [phone]            Aug 02, 2018  1:00 PM CDT   New Visit with Landen Quiñones MD   [facility] ([facility])    [address]  [address]  [facility] [address]   [phone]              Who to contact     If you have questions or need follow up information about today's clinic visit or your schedule please contact [facility] directly at [phone].  Normal or non-critical lab and imaging results will be communicated to you by MyChart, letter or phone within 4 business days after the clinic has received the results. If you do not hear from us within 7 days, please contact the clinic through MyChart or phone. If you have a critical or abnormal lab result, we will notify you by phone as soon as possible.  Submit refill requests through [employer] or call your pharmacy and they will forward the refill request to us. Please allow 3 business days for your refill to be completed.          Additional Information About Your Visit        Care EveryWhere ID     This is your Care EveryWhere ID. This could be used by other  organizations to access your [place] medical records  [id number]        Your Vitals Were     Pulse Respirations                69 16           Blood Pressure from Last 3 Encounters:   06/15/18 130/56   06/12/18 104/59   06/08/18 149/50    Weight from Last 3 Encounters:   06/12/18 83.5 kg (184 lb)   06/08/18 83.9 kg (184 lb 14.4 oz)   05/31/18 83.5 kg (184 lb)              Today, you had the following     No orders found for display       Primary Care Provider Office Phone # Fax #    Parrish Kristopher Funk [phone] [address] [facility] [address]        Equal Access to Services     JORGE COOL : Ashwin Huddleston, maritza underwood, randy fournier, song ureña. So [facility] [phone].    ATENCIÓN: Si habla español, tiene a uriostegui disposición servicios gratuitos de asistencia lingüística. Llame al [phone].    We comply with applicable federal civil rights laws and [place] laws. We do not discriminate on the basis of race, color, national origin, age, disability, sex, sexual orientation, or gender identity.            Thank you!     Thank you for choosing [facility]  for your care. Our goal is always to provide you with excellent care. Hearing back from our patients is one way we can continue to improve our services. Please take a few minutes to complete the written survey that you may receive in the mail after your visit with us. Thank you!             Your Updated Medication List - Protect others around you: Learn how to safely use, store and throw away your medicines at www.disposemymeds.org.          This list is accurate as of 6/15/18  3:30 PM.  Always use your most recent med list.                   Brand Name Dispense Instructions for use Diagnosis    acetaminophen 500 MG tablet    TYLENOL     Take 1-2 tablets by mouth every 6 hours as needed.        albuterol 108 (90 Base) MCG/ACT Inhaler    PROAIR HFA/PROVENTIL  HFA/VENTOLIN HFA    1 Inhaler    Inhale 2 puffs into the lungs every 4 hours as needed    Mild intermittent asthma without complication       ciprofloxacin 250 MG tablet    CIPRO    14 tablet    Take 1 tablet (250 mg) by mouth 2 times daily    Acute myeloid leukemia in remission (H)       desonide 0.05 % lotion    DESOWEN    118 mL    Apply topically as needed    Rosacea       furosemide 20 MG tablet    LASIX    30 tablet    Take 1 tablet (20 mg) by mouth every morning    Osteoarthritis of spine without myelopathy or radiculopathy, sacral and sacrococcygeal region       ibuprofen 400 MG tablet    ADVIL/MOTRIN    30 tablet    Take 1 tablet (400 mg) by mouth every 6 hours as needed for moderate pain    Osteoarthritis of spine without myelopathy or radiculopathy, sacral and sacrococcygeal region       loratadine 10 MG tablet    CLARITIN    30 tablet    Take 1 tablet by mouth daily as needed for allergies.    Allergic rhinitis       Melatonin ER 5 MG Tbcr     30 tablet    Take 1 tablet by mouth At Bedtime    Gastroesophageal reflux disease without esophagitis       mometasone-formoterol 200-5 MCG/ACT oral inhaler    DULERA     Inhale 2 puffs into the lungs 2 times daily        PANTOPRAZOLE SODIUM PO      Take 40 mg by mouth every morning (before breakfast)        potassium chloride SA 10 MEQ CR tablet    K-DUR/KLOR-CON M    90 tablet    Take 1 tablet (10 mEq) by mouth daily    Gastroesophageal reflux disease without esophagitis       propranolol 20 MG tablet    INDERAL    90 tablet    Take 1 tablet (20 mg) by mouth 2 times daily    Gastroesophageal reflux disease without esophagitis       SODIUM BICARBONATE PO      Take 650 mg by mouth daily as needed        tiotropium 18 MCG capsule    SPIRIVA     Inhale 1 capsule into the lungs daily Using PRN        traZODone 50 MG tablet    DESYREL    90 tablet    Take 1 tablet (50 mg) by mouth At Bedtime    Osteoarthritis of spine without myelopathy or radiculopathy, sacral and  sacrococcygeal region

## 2018-06-15 NOTE — PROGRESS NOTES
Infusion Nursing Note:  Abhijeet Mccauley presents today for Injectafer.    Patient seen by provider today: No   present during visit today: Not Applicable.    Note: N/A.    Intravenous Access:  Peripheral IV placed.    Treatment Conditions:  Not Applicable.      Post Infusion Assessment:  Patient tolerated infusion without incident.  Patient observed for 30 minutes post Injectafer per protocol.  Site patent and intact, free from redness, edema or discomfort.  No evidence of extravasations.    Discharge Plan:   Patient discharged in stable condition accompanied by: self.  Departure Mode: Ambulatory.    Joaquin Parra RN

## 2018-06-17 ENCOUNTER — NURSE TRIAGE (OUTPATIENT)
Dept: NURSING | Facility: CLINIC | Age: 58
End: 2018-06-17

## 2018-06-17 NOTE — TELEPHONE ENCOUNTER
Abhijeet had iron infusion on Friday, had severe abdominal pain afterwards that lasted hours.  Pain again on Saturday causing nausea and vomiting x 1 episode.  Pain has now again returned, difficulty moving around due to pain.  Took Tums without relief.      Reason for Disposition    [1] MILD-MODERATE pain AND [2] constant AND [3] present > 2 hours    Protocols used: ABDOMINAL PAIN - UPPER-ADULT-

## 2018-06-18 ENCOUNTER — TELEPHONE (OUTPATIENT)
Dept: FAMILY MEDICINE | Facility: CLINIC | Age: 58
End: 2018-06-18

## 2018-06-18 DIAGNOSIS — C92.01 ACUTE MYELOID LEUKEMIA IN REMISSION (H): Primary | ICD-10-CM

## 2018-06-18 NOTE — TELEPHONE ENCOUNTER
Reason for Call:  Order for shower chair    Detailed comments: Eleanor from [facility] is calling in need of a shower chair DME order for this patient. She stated she would fax a list of places they cover for us to fax to.    Phone Number Patient can be reached at: Other phone number:  [phone] is Eleanor    Best Time: any    Can we leave a detailed message on this number? YES   Lia Room  [facility] [place] [facility]      Call taken on 6/18/2018 at 3:34 PM by Lia Romo

## 2018-06-21 LAB — COPATH REPORT: NORMAL

## 2018-06-22 ENCOUNTER — TELEPHONE (OUTPATIENT)
Dept: ONCOLOGY | Facility: CLINIC | Age: 58
End: 2018-06-22

## 2018-06-22 NOTE — TELEPHONE ENCOUNTER
"Patient called to report that he \"absolutely am not coming to the clinic today\". He reports he doesn't feel well and is having abdominal cramping and pain \"ever since that iron infusion\".  Denies fever nor chills, constipation or diarrhea.  Is eating and drinking without issue.    Pt is scheduled for injectafer #2 today and to be seen by Dr. Rowe for bmbx results. Advised patient to be seen by Dr. Rowe for an assessment (abd pain, etc) and for bmbx results.  Pt refused stating \"absolutely not happening today. Not going to do it\".    Placed patient on hold, spoke with Dr. Rowe.  Pt does not need to come to clinic, bmbx results are OK.  Reschedule him for when he is back in office after vacation.  Cancel iron infusion.  Pt to go to ED if symptoms worsen or new ones develop over the weekgray ramirez    Picked call up from on hold, pt was not there.  Called pt back, apologized for inadvertent disconnection. Pt replied, \"nah, I hung up. Didn't want to wait\".    Reviewed the above with patient he is in agreement with this plan and would like to come see Dr. Rowe in July; 11th at 2:15 pm. Advised patient to be seen in ED if symptoms worsen or new ones develop.    Message sent to scheduling to add him to Dr. Rowe's schedule then.  "

## 2018-06-28 ENCOUNTER — OFFICE VISIT (OUTPATIENT)
Dept: AUDIOLOGY | Facility: CLINIC | Age: 58
End: 2018-06-28
Payer: COMMERCIAL

## 2018-06-28 DIAGNOSIS — H90.3 SENSORINEURAL HEARING LOSS, ASYMMETRICAL: Primary | ICD-10-CM

## 2018-06-28 PROCEDURE — 99207 ZZC NO CHARGE LOS: CPT | Performed by: AUDIOLOGIST

## 2018-06-28 PROCEDURE — 92592 HC HEARING AID CHECK, MONAURAL: CPT | Mod: RT | Performed by: AUDIOLOGIST

## 2018-06-28 NOTE — PROGRESS NOTES
57 year old male comes in for a hearing aid recheck. He is currently using 2014 Phonak Vigilixeo [id number] hearing aids that were fit a another facility. 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    An otoscopic examination was done and revealed clear external auditory canals bilaterally.     Replaced retention tail and large open dome. Verified hearing aid functionality.      Gave patient extra domes and wax guards.     Patient will contact me for additional hearing aid batteries after 7/9/2018.    See chart in the hearing aid room.     Ai KUNZ, #[id number]

## 2018-06-28 NOTE — MR AVS SNAPSHOT
After Visit Summary   6/28/2018    Abhijeet Mccauley    MRN: [id number]           Patient Information     Date Of Birth          1960        Visit Information        Provider Department      6/28/2018 10:00 AM Ai Min AuD [facility]        Today's Diagnoses     Sensorineural hearing loss, asymmetrical    -  1       Follow-ups after your visit        Your next 10 appointments already scheduled     Jun 28, 2018 10:00 AM CDT   Return Visit with Sheila Romano   [facility] ([facility]    [address] [facility] [address]   [phone]            Jul 11, 2018  2:15 PM CDT   Return Visit with Dangelo Rowe MD   [facility] ([facility])    [facility]  [address]  [facility] [address]   [phone]            Aug 02, 2018  1:00 PM CDT   New Visit with Landen Quiñones MD   [facility] ([facility])    [address]  [address]  [facility] [address]   [phone]              Who to contact     If you have questions or need follow up information about today's clinic visit or your schedule please contact [facility] directly at [phone].  Normal or non-critical lab and imaging results will be communicated to you by MyChart, letter or phone within 4 business days after the clinic has received the results. If you do not hear from us within 7 days, please contact the clinic through MyChart or phone. If you have a critical or abnormal lab result, we will notify you by phone as soon as possible.  Submit refill requests through "[employer] or call your pharmacy and they will forward the refill request to us. Please allow 3 business days for your refill to be completed.          Additional Information About Your Visit        Care EveryWhere ID     This is your Care EveryWhere ID. This could be  used by other organizations to access your [place] medical records  [id number]         Blood Pressure from Last 3 Encounters:   06/15/18 130/56   06/12/18 104/59   06/08/18 149/50    Weight from Last 3 Encounters:   06/12/18 184 lb (83.5 kg)   06/08/18 184 lb 14.4 oz (83.9 kg)   05/31/18 184 lb (83.5 kg)              We Performed the Following     HEARING AID CHECK, MONAURAL        Primary Care Provider Office Phone # Fax #    Parrish Funk [phone] [address] [facility] [address]        Equal Access to Services     JORGE COOL : Hadii sameera Huddleston, waaxda kj, qaybta kaalmada irlanda, song ureña. So [facility] [phone].    ATENCIÓN: Si habla español, tiene a uriostegui disposición servicios gratuitos de asistencia lingüística. Llame al [phone].    We comply with applicable federal civil rights laws and [place] laws. We do not discriminate on the basis of race, color, national origin, age, disability, sex, sexual orientation, or gender identity.            Thank you!     Thank you for choosing [facility]  for your care. Our goal is always to provide you with excellent care. Hearing back from our patients is one way we can continue to improve our services. Please take a few minutes to complete the written survey that you may receive in the mail after your visit with us. Thank you!             Your Updated Medication List - Protect others around you: Learn how to safely use, store and throw away your medicines at www.disposemymeds.org.          This list is accurate as of 6/28/18  9:40 AM.  Always use your most recent med list.                   Brand Name Dispense Instructions for use Diagnosis    acetaminophen 500 MG tablet    TYLENOL     Take 1-2 tablets by mouth every 6 hours as needed.        albuterol 108 (90 Base) MCG/ACT Inhaler    PROAIR HFA/PROVENTIL HFA/VENTOLIN HFA    1 Inhaler    Inhale 2 puffs into the  lungs every 4 hours as needed    Mild intermittent asthma without complication       ciprofloxacin 250 MG tablet    CIPRO    14 tablet    Take 1 tablet (250 mg) by mouth 2 times daily    Acute myeloid leukemia in remission (H)       desonide 0.05 % lotion    DESOWEN    118 mL    Apply topically as needed    Rosacea       furosemide 20 MG tablet    LASIX    30 tablet    Take 1 tablet (20 mg) by mouth every morning    Osteoarthritis of spine without myelopathy or radiculopathy, sacral and sacrococcygeal region       ibuprofen 400 MG tablet    ADVIL/MOTRIN    30 tablet    Take 1 tablet (400 mg) by mouth every 6 hours as needed for moderate pain    Osteoarthritis of spine without myelopathy or radiculopathy, sacral and sacrococcygeal region       loratadine 10 MG tablet    CLARITIN    30 tablet    Take 1 tablet by mouth daily as needed for allergies.    Allergic rhinitis       Melatonin ER 5 MG Tbcr     30 tablet    Take 1 tablet by mouth At Bedtime    Gastroesophageal reflux disease without esophagitis       mometasone-formoterol 200-5 MCG/ACT oral inhaler    DULERA     Inhale 2 puffs into the lungs 2 times daily        order for DME     1 Device    Equipment being ordered: shower chair    Acute myeloid leukemia in remission (H)       PANTOPRAZOLE SODIUM PO      Take 40 mg by mouth every morning (before breakfast)        potassium chloride SA 10 MEQ CR tablet    K-DUR/KLOR-CON M    90 tablet    Take 1 tablet (10 mEq) by mouth daily    Gastroesophageal reflux disease without esophagitis       propranolol 20 MG tablet    INDERAL    90 tablet    Take 1 tablet (20 mg) by mouth 2 times daily    Gastroesophageal reflux disease without esophagitis       SODIUM BICARBONATE PO      Take 650 mg by mouth daily as needed        tiotropium 18 MCG capsule    SPIRIVA     Inhale 1 capsule into the lungs daily Using PRN        traZODone 50 MG tablet    DESYREL    90 tablet    Take 1 tablet (50 mg) by mouth At Bedtime    Osteoarthritis  of spine without myelopathy or radiculopathy, sacral and sacrococcygeal region

## 2018-07-10 ENCOUNTER — ALLIED HEALTH/NURSE VISIT (OUTPATIENT)
Dept: AUDIOLOGY | Facility: CLINIC | Age: 58
End: 2018-07-10
Payer: COMMERCIAL

## 2018-07-10 DIAGNOSIS — H90.3 SENSORINEURAL HEARING LOSS, ASYMMETRICAL: Primary | ICD-10-CM

## 2018-07-10 PROCEDURE — V5266 BATTERY FOR HEARING DEVICE: HCPCS | Performed by: AUDIOLOGIST

## 2018-07-10 PROCEDURE — 99207 ZZC NO CHARGE LOS: CPT | Performed by: AUDIOLOGIST

## 2018-07-10 NOTE — PROGRESS NOTES
57 year old male requests hearing aid batteries. He is currently wearing 2014 KeyViveak Root4eo [id number] hearing aids that were fit at another facility.     Verified date of last dispensing and battery size needed.     Will  30 size 312 hearing aid batteries at the specialty clinic .     See chart in the hearing aid room.     Ai KUNZ, #[id number]

## 2018-07-10 NOTE — MR AVS SNAPSHOT
After Visit Summary   7/10/2018    Abhijeet Mccauley    MRN: [id number]           Patient Information     Date Of Birth          1960        Visit Information        Provider Department      7/10/2018 8:30 AM Ai Min AuD [facility]        Today's Diagnoses     Sensorineural hearing loss, asymmetrical    -  1       Follow-ups after your visit        Your next 10 appointments already scheduled     Jul 10, 2018  8:30 AM CDT   Hearing Aid Drop-off with Sheila Romano   [facility]    [address] [facility] [address]   [phone]            Jul 11, 2018  2:15 PM CDT   Return Visit with Dangelo Rowe MD   [facility] ([facility])    [facility]  [address]  [facility] [address]   [phone]            Aug 02, 2018  1:00 PM CDT   New Visit with Landen Quiñones MD   [facility] ([facility])    [address]  [address]  [facility] [address]   [phone]              Who to contact     If you have questions or need follow up information about today's clinic visit or your schedule please contact [facility] directly at [phone].  Normal or non-critical lab and imaging results will be communicated to you by MyChart, letter or phone within 4 business days after the clinic has received the results. If you do not hear from us within 7 days, please contact the clinic through MyChart or phone. If you have a critical or abnormal lab result, we will notify you by phone as soon as possible.  Submit refill requests through [email or web address] or call your pharmacy and they will forward the refill request to us. Please allow 3 business days for your refill to be completed.          Additional Information About Your Visit        Care EveryWhere ID     This is your Care EveryWhere ID. This could  be used by other organizations to access your [place] medical records  [id number]         Blood Pressure from Last 3 Encounters:   06/15/18 130/56   06/12/18 104/59   06/08/18 149/50    Weight from Last 3 Encounters:   06/12/18 184 lb (83.5 kg)   06/08/18 184 lb 14.4 oz (83.9 kg)   05/31/18 184 lb (83.5 kg)              We Performed the Following     HC HEARING DEVICE BATTERY        Primary Care Provider Office Phone # Fax #    Parrish Kristopher Funk [phone] [address] [facility] [address] [facility] [address]        Equal Access to Services     JORGE COOL : Hadii aad juan luis hadasho Soemily, waaxda luqadaha, qaybta kaalmada adedelroyyada, song ureña. So [facility] [phone].    ATENCIÓN: Si habla español, tiene a uriostegui disposición servicios gratuitos de asistencia lingüística. Llame al [phone].    We comply with applicable federal civil rights laws and [place] laws. We do not discriminate on the basis of race, color, national origin, age, disability, sex, sexual orientation, or gender identity.            Thank you!     Thank you for choosing [facility]  for your care. Our goal is always to provide you with excellent care. Hearing back from our patients is one way we can continue to improve our services. Please take a few minutes to complete the written survey that you may receive in the mail after your visit with us. Thank you!             Your Updated Medication List - Protect others around you: Learn how to safely use, store and throw away your medicines at www.disposemymeds.org.          This list is accurate as of 7/10/18  8:14 AM.  Always use your most recent med list.                   Brand Name Dispense Instructions for use Diagnosis    acetaminophen 500 MG tablet    TYLENOL     Take 1-2 tablets by mouth every 6 hours as needed.        albuterol 108 (90 Base) MCG/ACT Inhaler    PROAIR HFA/PROVENTIL HFA/VENTOLIN HFA    1 Inhaler    Inhale 2 puffs into the  lungs every 4 hours as needed    Mild intermittent asthma without complication       ciprofloxacin 250 MG tablet    CIPRO    14 tablet    Take 1 tablet (250 mg) by mouth 2 times daily    Acute myeloid leukemia in remission (H)       desonide 0.05 % lotion    DESOWEN    118 mL    Apply topically as needed    Rosacea       furosemide 20 MG tablet    LASIX    30 tablet    Take 1 tablet (20 mg) by mouth every morning    Osteoarthritis of spine without myelopathy or radiculopathy, sacral and sacrococcygeal region       ibuprofen 400 MG tablet    ADVIL/MOTRIN    30 tablet    Take 1 tablet (400 mg) by mouth every 6 hours as needed for moderate pain    Osteoarthritis of spine without myelopathy or radiculopathy, sacral and sacrococcygeal region       loratadine 10 MG tablet    CLARITIN    30 tablet    Take 1 tablet by mouth daily as needed for allergies.    Allergic rhinitis       Melatonin ER 5 MG Tbcr     30 tablet    Take 1 tablet by mouth At Bedtime    Gastroesophageal reflux disease without esophagitis       mometasone-formoterol 200-5 MCG/ACT oral inhaler    DULERA     Inhale 2 puffs into the lungs 2 times daily        order for DME     1 Device    Equipment being ordered: shower chair    Acute myeloid leukemia in remission (H)       PANTOPRAZOLE SODIUM PO      Take 40 mg by mouth every morning (before breakfast)        potassium chloride SA 10 MEQ CR tablet    K-DUR/KLOR-CON M    90 tablet    Take 1 tablet (10 mEq) by mouth daily    Gastroesophageal reflux disease without esophagitis       propranolol 20 MG tablet    INDERAL    90 tablet    Take 1 tablet (20 mg) by mouth 2 times daily    Gastroesophageal reflux disease without esophagitis       SODIUM BICARBONATE PO      Take 650 mg by mouth daily as needed        tiotropium 18 MCG capsule    SPIRIVA     Inhale 1 capsule into the lungs daily Using PRN        traZODone 50 MG tablet    DESYREL    90 tablet    Take 1 tablet (50 mg) by mouth At Bedtime    Osteoarthritis  of spine without myelopathy or radiculopathy, sacral and sacrococcygeal region

## 2018-07-19 ENCOUNTER — HOSPITAL ENCOUNTER (OUTPATIENT)
Dept: LAB | Facility: CLINIC | Age: 58
Discharge: HOME | End: 2018-07-19
Attending: INTERNAL MEDICINE | Admitting: INTERNAL MEDICINE
Payer: COMMERCIAL

## 2018-07-19 ENCOUNTER — ONCOLOGY VISIT (OUTPATIENT)
Dept: ONCOLOGY | Facility: CLINIC | Age: 58
End: 2018-07-19
Attending: INTERNAL MEDICINE
Payer: COMMERCIAL

## 2018-07-19 VITALS
DIASTOLIC BLOOD PRESSURE: 64 MMHG | HEIGHT: 65 IN | WEIGHT: 183.7 LBS | TEMPERATURE: 98.8 F | HEART RATE: 80 BPM | SYSTOLIC BLOOD PRESSURE: 143 MMHG | RESPIRATION RATE: 18 BRPM | BODY MASS INDEX: 30.6 KG/M2 | OXYGEN SATURATION: 97 %

## 2018-07-19 DIAGNOSIS — F17.200 TOBACCO DEPENDENCE SYNDROME: ICD-10-CM

## 2018-07-19 DIAGNOSIS — D64.9 NORMOCYTIC ANEMIA: ICD-10-CM

## 2018-07-19 DIAGNOSIS — D69.6 THROMBOCYTOPENIA (H): ICD-10-CM

## 2018-07-19 DIAGNOSIS — J45.20 MILD INTERMITTENT ASTHMA WITHOUT COMPLICATION: ICD-10-CM

## 2018-07-19 DIAGNOSIS — C92.01 ACUTE MYELOID LEUKEMIA IN REMISSION (H): ICD-10-CM

## 2018-07-19 DIAGNOSIS — D64.9 NORMOCYTIC ANEMIA: Primary | ICD-10-CM

## 2018-07-19 DIAGNOSIS — K70.30 ALCOHOLIC CIRRHOSIS OF LIVER WITHOUT ASCITES (H): ICD-10-CM

## 2018-07-19 DIAGNOSIS — I10 ESSENTIAL HYPERTENSION: ICD-10-CM

## 2018-07-19 DIAGNOSIS — N18.30 CKD (CHRONIC KIDNEY DISEASE) STAGE 3, GFR 30-59 ML/MIN (H): ICD-10-CM

## 2018-07-19 LAB
BASOPHILS # BLD AUTO: 0.1 10E9/L (ref 0–0.2)
BASOPHILS NFR BLD AUTO: 0.9 %
DIFFERENTIAL METHOD BLD: ABNORMAL
EOSINOPHIL # BLD AUTO: 0.3 10E9/L (ref 0–0.7)
EOSINOPHIL NFR BLD AUTO: 3.1 %
ERYTHROCYTE [DISTWIDTH] IN BLOOD BY AUTOMATED COUNT: 23.7 % (ref 10–15)
HCT VFR BLD AUTO: 37.3 % (ref 40–53)
HGB BLD-MCNC: 11.6 G/DL (ref 13.3–17.7)
IMM GRANULOCYTES # BLD: 0.1 10E9/L (ref 0–0.4)
IMM GRANULOCYTES NFR BLD: 0.6 %
LYMPHOCYTES # BLD AUTO: 2.3 10E9/L (ref 0.8–5.3)
LYMPHOCYTES NFR BLD AUTO: 20.9 %
MCH RBC QN AUTO: 30.6 PG (ref 26.5–33)
MCHC RBC AUTO-ENTMCNC: 31.1 G/DL (ref 31.5–36.5)
MCV RBC AUTO: 98 FL (ref 78–100)
MONOCYTES # BLD AUTO: 1.7 10E9/L (ref 0–1.3)
MONOCYTES NFR BLD AUTO: 15 %
NEUTROPHILS # BLD AUTO: 6.5 10E9/L (ref 1.6–8.3)
NEUTROPHILS NFR BLD AUTO: 59.5 %
NRBC # BLD AUTO: 0 10*3/UL
NRBC BLD AUTO-RTO: 0 /100
PLATELET # BLD AUTO: 214 10E9/L (ref 150–450)
RBC # BLD AUTO: 3.79 10E12/L (ref 4.4–5.9)
WBC # BLD AUTO: 11 10E9/L (ref 4–11)

## 2018-07-19 PROCEDURE — 85025 COMPLETE CBC W/AUTO DIFF WBC: CPT | Performed by: INTERNAL MEDICINE

## 2018-07-19 PROCEDURE — G0463 HOSPITAL OUTPT CLINIC VISIT: HCPCS

## 2018-07-19 PROCEDURE — 99214 OFFICE O/P EST MOD 30 MIN: CPT | Performed by: INTERNAL MEDICINE

## 2018-07-19 PROCEDURE — 36415 COLL VENOUS BLD VENIPUNCTURE: CPT | Performed by: INTERNAL MEDICINE

## 2018-07-19 ASSESSMENT — PAIN SCALES - GENERAL: PAINLEVEL: WORST PAIN (10)

## 2018-07-19 NOTE — PROGRESS NOTES
Hematology/ Oncology Follow-up Visit:  Jul 19, 2018    Reason for Visit:   Chief Complaint   Patient presents with     Oncology Clinic Visit     1 month recheck Normocytic anemia. review BMBX            Interval History:  Patient is here today for follow-up.  Has been feeling well without any significant complaints.  He denies any shortness of breath or dizziness.  He denies any fever or chills.  He received iron infusion with significant bone aches or pains.    Review Of Systems:  Constitutional: Negative for fever, chills, and night sweats.  Skin: negative.  Eyes: negative.  Ears/Nose/Throat: negative.  Respiratory: No shortness of breath, dyspnea on exertion, cough, or hemoptysis.  Cardiovascular: negative.  Gastrointestinal: negative.  Genitourinary: negative.  Musculoskeletal: negative.  Neurologic: negative.  Psychiatric: negative.  Hematologic/Lymphatic/Immunologic: negative.  Endocrine: negative.    All other ROS negative unless mentioned in interval history.    Past medical, social, surgical, and family histories reviewed.    Allergies:  Allergies as of 07/19/2018 - [place] as Reviewed 07/19/2018   Allergen Reaction Noted     Blood transfusion related (informational only)  03/17/2018     Famotidine Unknown and Other (See Comments) 06/27/2008     Cyclobenzaprine  03/18/2017     Gabapentin  02/22/2016     Methocarbamol  02/22/2016     Pepcid Cramps 06/08/2009     Vancomycin  08/11/2008     Vfend  06/08/2009     Hydrocodone-acetaminophen Rash 09/28/2016       Current Medications:  Current Outpatient Prescriptions   Medication Sig Dispense Refill     acetaminophen (TYLENOL) 500 MG tablet Take 1-2 tablets by mouth every 6 hours as needed.       albuterol (PROAIR HFA/PROVENTIL HFA/VENTOLIN HFA) 108 (90 BASE) MCG/ACT Inhaler Inhale 2 puffs into the lungs every 4 hours as needed 1 Inhaler 11     ciprofloxacin (CIPRO) 250 MG tablet Take 1 tablet (250 mg) by mouth 2 times daily 14 tablet 0     desonide (DESOWEN) 0.05 %  "lotion Apply topically as needed 118 mL 1     furosemide (LASIX) 20 MG tablet Take 1 tablet (20 mg) by mouth every morning 30 tablet 11     ibuprofen (ADVIL/MOTRIN) 400 MG tablet Take 1 tablet (400 mg) by mouth every 6 hours as needed for moderate pain 30 tablet 5     loratadine (CLARITIN) 10 MG tablet Take 1 tablet by mouth daily as needed for allergies. 30 tablet 6     Melatonin ER 5 MG TBCR Take 1 tablet by mouth At Bedtime 30 tablet 11     mometasone-formoterol (DULERA) 200-5 MCG/ACT oral inhaler Inhale 2 puffs into the lungs 2 times daily        order for DME Equipment being ordered: shower chair 1 Device 0     potassium chloride SA (K-DUR/KLOR-CON M) 10 MEQ CR tablet Take 1 tablet (10 mEq) by mouth daily 90 tablet 3     propranolol (INDERAL) 20 MG tablet Take 1 tablet (20 mg) by mouth 2 times daily 90 tablet 3     SODIUM BICARBONATE PO Take 650 mg by mouth daily as needed        traZODone (DESYREL) 50 MG tablet Take 1 tablet (50 mg) by mouth At Bedtime 90 tablet 3     PANTOPRAZOLE SODIUM PO Take 40 mg by mouth every morning (before breakfast)       tiotropium (SPIRIVA) 18 MCG capsule Inhale 1 capsule into the lungs daily Using PRN          Physical Exam:  /64 (BP Location: Right arm, Patient Position: Sitting, Cuff Size: Adult Large)  Pulse 80  Temp 98.8  F (37.1  C) (Tympanic)  Resp 18  Ht 1.651 m (5' 5\")  Wt 83.3 kg (183 lb 11.2 oz)  SpO2 97%  BMI 30.57 kg/m2  Wt Readings from Last 12 Encounters:   07/19/18 83.3 kg (183 lb 11.2 oz)   06/12/18 83.5 kg (184 lb)   06/08/18 83.9 kg (184 lb 14.4 oz)   05/31/18 83.5 kg (184 lb)   05/24/18 (P) 83 kg (183 lb)   05/18/18 82.6 kg (182 lb 1.6 oz)   05/17/18 84.8 kg (187 lb)   05/02/18 83.7 kg (184 lb 9.6 oz)   03/28/18 83 kg (183 lb)   03/16/18 83.9 kg (184 lb 15.5 oz)   02/21/18 85.3 kg (188 lb)   02/08/18 86.2 kg (190 lb)     GENERAL APPEARANCE: Healthy, alert and in no acute distress.  HEENT: Sclerae anicteric. PERRLA. Oropharynx without ulcers, " lesions, or thrush.  NECK: Supple. No asymmetry or masses.  LYMPHATICS: No palpable cervical, supraclavicular, axillary, or inguinal lymphadenopathy.  RESP: Lungs clear to auscultation bilaterally without rales, rhonchi or wheezes.  CARDIOVASCULAR: Regular rate and rhythm. Normal S1, S2; no S3 or S4. No murmur, gallop, or rub.  ABDOMEN: Soft, nontender. Bowel sounds normal. No palpable organomegaly or masses.  MUSCULOSKELETAL: Extremities without gross deformities noted. No edema of bilateral lower extremities.  SKIN: No suspicious lesions or rashes.  NEURO: Alert and oriented x 3. Cranial nerves II-XII grossly intact.  PSYCHIATRIC: Mentation and affect appear normal.    Laboratory/Imaging Studies:  No visits with results within 2 Week(s) from this visit.  Latest known visit with results is:    Orders Only on 06/08/2018   Component Date Value Ref Range Status     Color Urine 06/08/2018 Straw   Final     Appearance Urine 06/08/2018 Slightly Cloudy   Final     Glucose Urine 06/08/2018 Negative  NEG^Negative mg/dL Final     Bilirubin Urine 06/08/2018 Negative  NEG^Negative Final     Ketones Urine 06/08/2018 Negative  NEG^Negative mg/dL Final     Specific Gravity Urine 06/08/2018 1.004  1.003 - 1.035 Final     Blood Urine 06/08/2018 Moderate* NEG^Negative Final     pH Urine 06/08/2018 6.0  5.0 - 7.0 pH Final     Protein Albumin Urine 06/08/2018 Negative  NEG^Negative mg/dL Final     Urobilinogen mg/dL 06/08/2018 0.0  0.0 - 2.0 mg/dL Final     Nitrite Urine 06/08/2018 Negative  NEG^Negative Final     Leukocyte Esterase Urine 06/08/2018 Large* NEG^Negative Final     Source 06/08/2018 Midstream Urine   Final     RBC Urine 06/08/2018 10* 0 - 2 /HPF Final     WBC Urine 06/08/2018 >182* 0 - 5 /HPF Final     WBC Clumps 06/08/2018 Present* NEG^Negative /HPF Final     Bacteria Urine 06/08/2018 Few* NEG^Negative /HPF Final     Squamous Epithelial /HPF Urine 06/08/2018 <1  0 - 1 /HPF Final          Assessment and plan:    (D64.9)  Normocytic anemia  (primary encounter diagnosis)  Anemia probably related to iron deficiency.  We will continue to monitor his hemoglobin.  Plan: CBC with platelets differential today    (F17.200) Tobacco dependence syndrome  Strongly emphasized importance of quitting smoking.    (J45.20) Mild intermittent asthma without complication  Patient symptoms is well controlled on albuterol as needed.    (N18.3) CKD (chronic kidney disease) stage 3, GFR 30-59 ml/min  Patient creatinine has been stable.    (I10) Essential hypertension  Blood pressure is currently well-controlled.  He is on propranolol 20 mg orally daily.  Also on Lasix 20 mg orally daily.    (K70.30) Alcoholic cirrhosis of liver without ascites (H)  Strongly emphasized importance continue to follow with hematology.    (C92.01) Acute myeloid leukemia in remission (H)  The patient today most recent bone marrow biopsy.  There is no evidence of recurrence of leukemia.      The patient is ready to learn, no apparent learning barriers were identified.  Diagnosis and treatment plans were explained to the patient. The patient expressed understanding of the content. The patient asked appropriate questions. The patient questions were answered to his satisfaction.    Chart documentation with Dragon Voice recognition Software. Although reviewed after completion, some words and grammatical errors may remain.

## 2018-07-19 NOTE — PATIENT INSTRUCTIONS
We would like you to have a CBC today and to call GI and reschedule appointment for tomorrow. Follow up as needed.    When you are in need of a refill, please call your pharmacy and they will send us a request.      Copy of appointments, and after visit summary (AVS) given to patient.      If you have any questions during business hours (M-F 8 AM- 4PM), please call Kellie Putnam RN, /Breast Cancer Navigator at [facility] [phone].       For questions after business hours, or on holidays/weekends, please call our after hours Nurse Triage line [phone]. Thank you.

## 2018-07-19 NOTE — MR AVS SNAPSHOT
After Visit Summary   7/19/2018    Abhijeet Mccauley    MRN: [id number]           Patient Information     Date Of Birth          1960        Visit Information        Provider Department      7/19/2018 1:45 PM Dangelo Rowe MD [facility] ONCOLOGY      Today's Diagnoses     Normocytic anemia    -  1    Tobacco dependence syndrome        Mild intermittent asthma without complication        CKD (chronic kidney disease) stage 3, GFR 30-59 ml/min        Essential hypertension        Alcoholic cirrhosis of liver without ascites (H)        Thrombocytopenia (H)        Acute myeloid leukemia in remission (H)          Care Instructions    We would like you to have a CBC today and to call GI and reschedule appointment for tomorrow. Follow up as needed.    When you are in need of a refill, please call your pharmacy and they will send us a request.      Copy of appointments, and after visit summary (AVS) given to patient.      If you have any questions during business hours (M-F 8 AM- 4PM), please call Kellie Putnam RN, /Breast Cancer Navigator at [facility] [phone].       For questions after business hours, or on holidays/weekends, please call our after hours Nurse Triage line [phone]. Thank you.            Follow-ups after your visit        Follow-up notes from your care team     Return if symptoms worsen or fail to improve.      Your next 10 appointments already scheduled     Aug 02, 2018  1:00 PM CDT   New Visit with Landen Quiñones MD   [facility] ([facility])    [address]  [address]  [facility] [place] [address]   [phone]              Future tests that were ordered for you today     Open Future Orders        Priority Expected Expires Ordered    CBC with platelets differential Routine 7/19/2018 7/19/2019 7/19/2018            Who to contact     If you have  "questions or need follow up information about today's clinic visit or your schedule please contact [facility] CANCER CLINIC directly at [phone].  Normal or non-critical lab and imaging results will be communicated to you by MyChart, letter or phone within 4 business days after the clinic has received the results. If you do not hear from us within 7 days, please contact the clinic through MyChart or phone. If you have a critical or abnormal lab result, we will notify you by phone as soon as possible.  Submit refill requests through [employer] or call your pharmacy and they will forward the refill request to us. Please allow 3 business days for your refill to be completed.          Additional Information About Your Visit        Care EveryWhere ID     This is your Care EveryWhere ID. This could be used by other organizations to access your [facility] medical records  [id number]        Your Vitals Were     Pulse Temperature Respirations Height Pulse Oximetry BMI (Body Mass Index)    80 98.8  F (37.1  C) (Tympanic) 18 1.651 m (5' 5\") 97% 30.57 kg/m2       Blood Pressure from Last 3 Encounters:   07/19/18 143/64   06/15/18 130/56   06/12/18 104/59    Weight from Last 3 Encounters:   07/19/18 83.3 kg (183 lb 11.2 oz)   06/12/18 83.5 kg (184 lb)   06/08/18 83.9 kg (184 lb 14.4 oz)               Primary Care Provider Office Phone # Fax #    Parrish Kristopher Funk [phone] [address] [address]        Equal Access to Services     JORGE COOL : Hadii sameera pickeringo Soemily, waaxda luqadaha, qaybta kaalmasong jesus . So [facility] [phone].    ATENCIÓN: Si habla español, tiene a uriostegui disposición servicios gratuitos de asistencia lingüística. Llame al [phone].    We comply with applicable federal civil rights laws and [place] laws. We do not discriminate on the basis of race, color, national origin, age, disability, sex, sexual orientation, or " gender identity.            Thank you!     Thank you for choosing [facility] CANCER [facility]  for your care. Our goal is always to provide you with excellent care. Hearing back from our patients is one way we can continue to improve our services. Please take a few minutes to complete the written survey that you may receive in the mail after your visit with us. Thank you!             Your Updated Medication List - Protect others around you: Learn how to safely use, store and throw away your medicines at www.disposemymeds.org.          This list is accurate as of 7/19/18  3:11 PM.  Always use your most recent med list.                   Brand Name Dispense Instructions for use Diagnosis    acetaminophen 500 MG tablet    TYLENOL     Take 1-2 tablets by mouth every 6 hours as needed.        albuterol 108 (90 Base) MCG/ACT Inhaler    PROAIR HFA/PROVENTIL HFA/VENTOLIN HFA    1 Inhaler    Inhale 2 puffs into the lungs every 4 hours as needed    Mild intermittent asthma without complication       ciprofloxacin 250 MG tablet    CIPRO    14 tablet    Take 1 tablet (250 mg) by mouth 2 times daily    Acute myeloid leukemia in remission (H)       desonide 0.05 % lotion    DESOWEN    118 mL    Apply topically as needed    Rosacea       furosemide 20 MG tablet    LASIX    30 tablet    Take 1 tablet (20 mg) by mouth every morning    Osteoarthritis of spine without myelopathy or radiculopathy, sacral and sacrococcygeal region       ibuprofen 400 MG tablet    ADVIL/MOTRIN    30 tablet    Take 1 tablet (400 mg) by mouth every 6 hours as needed for moderate pain    Osteoarthritis of spine without myelopathy or radiculopathy, sacral and sacrococcygeal region       loratadine 10 MG tablet    CLARITIN    30 tablet    Take 1 tablet by mouth daily as needed for allergies.    Allergic rhinitis       Melatonin ER 5 MG Tbcr     30 tablet    Take 1 tablet by mouth At Bedtime    Gastroesophageal reflux disease without esophagitis        mometasone-formoterol 200-5 MCG/ACT oral inhaler    DULERA     Inhale 2 puffs into the lungs 2 times daily        order for DME     1 Device    Equipment being ordered: shower chair    Acute myeloid leukemia in remission (H)       PANTOPRAZOLE SODIUM PO      Take 40 mg by mouth every morning (before breakfast)        potassium chloride SA 10 MEQ CR tablet    K-DUR/KLOR-CON M    90 tablet    Take 1 tablet (10 mEq) by mouth daily    Gastroesophageal reflux disease without esophagitis       propranolol 20 MG tablet    INDERAL    90 tablet    Take 1 tablet (20 mg) by mouth 2 times daily    Gastroesophageal reflux disease without esophagitis       SODIUM BICARBONATE PO      Take 650 mg by mouth daily as needed        tiotropium 18 MCG capsule    SPIRIVA     Inhale 1 capsule into the lungs daily Using PRN        traZODone 50 MG tablet    DESYREL    90 tablet    Take 1 tablet (50 mg) by mouth At Bedtime    Osteoarthritis of spine without myelopathy or radiculopathy, sacral and sacrococcygeal region

## 2018-07-19 NOTE — LETTER
7/19/2018         RE: Abhijeet Mccauley  [address] [place] [address]        Dear Colleague,    Thank you for referring your patient, Abhijeet Mccauley, to the [facility]. Please see a copy of my visit note below.    Hematology/ Oncology Follow-up Visit:  Jul 19, 2018    Reason for Visit:   Chief Complaint   Patient presents with     Oncology Clinic Visit     1 month recheck Normocytic anemia. review BMBX            Interval History:  Patient is here today for follow-up.  Has been feeling well without any significant complaints.  He denies any shortness of breath or dizziness.  He denies any fever or chills.  He received iron infusion with significant bone aches or pains.    Review Of Systems:  Constitutional: Negative for fever, chills, and night sweats.  Skin: negative.  Eyes: negative.  Ears/Nose/Throat: negative.  Respiratory: No shortness of breath, dyspnea on exertion, cough, or hemoptysis.  Cardiovascular: negative.  Gastrointestinal: negative.  Genitourinary: negative.  Musculoskeletal: negative.  Neurologic: negative.  Psychiatric: negative.  Hematologic/Lymphatic/Immunologic: negative.  Endocrine: negative.    All other ROS negative unless mentioned in interval history.    Past medical, social, surgical, and family histories reviewed.    Allergies:  Allergies as of 07/19/2018 - [place] as Reviewed 07/19/2018   Allergen Reaction Noted     Blood transfusion related (informational only)  03/17/2018     Famotidine Unknown and Other (See Comments) 06/27/2008     Cyclobenzaprine  03/18/2017     Gabapentin  02/22/2016     Methocarbamol  02/22/2016     Pepcid Cramps 06/08/2009     Vancomycin  08/11/2008     Vfend  06/08/2009     Hydrocodone-acetaminophen Rash 09/28/2016       Current Medications:  Current Outpatient Prescriptions   Medication Sig Dispense Refill     acetaminophen (TYLENOL) 500 MG tablet Take 1-2 tablets by mouth every 6 hours as needed.       albuterol (PROAIR HFA/PROVENTIL  "HFA/VENTOLIN HFA) 108 (90 BASE) MCG/ACT Inhaler Inhale 2 puffs into the lungs every 4 hours as needed 1 Inhaler 11     ciprofloxacin (CIPRO) 250 MG tablet Take 1 tablet (250 mg) by mouth 2 times daily 14 tablet 0     desonide (DESOWEN) 0.05 % lotion Apply topically as needed 118 mL 1     furosemide (LASIX) 20 MG tablet Take 1 tablet (20 mg) by mouth every morning 30 tablet 11     ibuprofen (ADVIL/MOTRIN) 400 MG tablet Take 1 tablet (400 mg) by mouth every 6 hours as needed for moderate pain 30 tablet 5     loratadine (CLARITIN) 10 MG tablet Take 1 tablet by mouth daily as needed for allergies. 30 tablet 6     Melatonin ER 5 MG TBCR Take 1 tablet by mouth At Bedtime 30 tablet 11     mometasone-formoterol (DULERA) 200-5 MCG/ACT oral inhaler Inhale 2 puffs into the lungs 2 times daily        order for DME Equipment being ordered: shower chair 1 Device 0     potassium chloride SA (K-DUR/KLOR-CON M) 10 MEQ CR tablet Take 1 tablet (10 mEq) by mouth daily 90 tablet 3     propranolol (INDERAL) 20 MG tablet Take 1 tablet (20 mg) by mouth 2 times daily 90 tablet 3     SODIUM BICARBONATE PO Take 650 mg by mouth daily as needed        traZODone (DESYREL) 50 MG tablet Take 1 tablet (50 mg) by mouth At Bedtime 90 tablet 3     PANTOPRAZOLE SODIUM PO Take 40 mg by mouth every morning (before breakfast)       tiotropium (SPIRIVA) 18 MCG capsule Inhale 1 capsule into the lungs daily Using PRN          Physical Exam:  /64 (BP Location: Right arm, Patient Position: Sitting, Cuff Size: Adult Large)  Pulse 80  Temp 98.8  F (37.1  C) (Tympanic)  Resp 18  Ht 1.651 m (5' 5\")  Wt 83.3 kg (183 lb 11.2 oz)  SpO2 97%  BMI 30.57 kg/m2  Wt Readings from Last 12 Encounters:   07/19/18 83.3 kg (183 lb 11.2 oz)   06/12/18 83.5 kg (184 lb)   06/08/18 83.9 kg (184 lb 14.4 oz)   05/31/18 83.5 kg (184 lb)   05/24/18 (P) 83 kg (183 lb)   05/18/18 82.6 kg (182 lb 1.6 oz)   05/17/18 84.8 kg (187 lb)   05/02/18 83.7 kg (184 lb 9.6 oz) "   03/28/18 83 kg (183 lb)   03/16/18 83.9 kg (184 lb 15.5 oz)   02/21/18 85.3 kg (188 lb)   02/08/18 86.2 kg (190 lb)     GENERAL APPEARANCE: Healthy, alert and in no acute distress.  HEENT: Sclerae anicteric. PERRLA. Oropharynx without ulcers, lesions, or thrush.  NECK: Supple. No asymmetry or masses.  LYMPHATICS: No palpable cervical, supraclavicular, axillary, or inguinal lymphadenopathy.  RESP: Lungs clear to auscultation bilaterally without rales, rhonchi or wheezes.  CARDIOVASCULAR: Regular rate and rhythm. Normal S1, S2; no S3 or S4. No murmur, gallop, or rub.  ABDOMEN: Soft, nontender. Bowel sounds normal. No palpable organomegaly or masses.  MUSCULOSKELETAL: Extremities without gross deformities noted. No edema of bilateral lower extremities.  SKIN: No suspicious lesions or rashes.  NEURO: Alert and oriented x 3. Cranial nerves II-XII grossly intact.  PSYCHIATRIC: Mentation and affect appear normal.    Laboratory/Imaging Studies:  No visits with results within 2 Week(s) from this visit.  Latest known visit with results is:    Orders Only on 06/08/2018   Component Date Value Ref Range Status     Color Urine 06/08/2018 Straw   Final     Appearance Urine 06/08/2018 Slightly Cloudy   Final     Glucose Urine 06/08/2018 Negative  NEG^Negative mg/dL Final     Bilirubin Urine 06/08/2018 Negative  NEG^Negative Final     Ketones Urine 06/08/2018 Negative  NEG^Negative mg/dL Final     Specific Gravity Urine 06/08/2018 1.004  1.003 - 1.035 Final     Blood Urine 06/08/2018 Moderate* NEG^Negative Final     pH Urine 06/08/2018 6.0  5.0 - 7.0 pH Final     Protein Albumin Urine 06/08/2018 Negative  NEG^Negative mg/dL Final     Urobilinogen mg/dL 06/08/2018 0.0  0.0 - 2.0 mg/dL Final     Nitrite Urine 06/08/2018 Negative  NEG^Negative Final     Leukocyte Esterase Urine 06/08/2018 Large* NEG^Negative Final     Source 06/08/2018 Midstream Urine   Final     RBC Urine 06/08/2018 10* 0 - 2 /HPF Final     WBC Urine 06/08/2018  >182* 0 - 5 /HPF Final     WBC Clumps 06/08/2018 Present* NEG^Negative /HPF Final     Bacteria Urine 06/08/2018 Few* NEG^Negative /HPF Final     Squamous Epithelial /HPF Urine 06/08/2018 <1  0 - 1 /HPF Final          Assessment and plan:    (D64.9) Normocytic anemia  (primary encounter diagnosis)  Anemia probably related to iron deficiency.  We will continue to monitor his hemoglobin.  Plan: CBC with platelets differential today    (F17.200) Tobacco dependence syndrome  Strongly emphasized importance of quitting smoking.    (J45.20) Mild intermittent asthma without complication  Patient symptoms is well controlled on albuterol as needed.    (N18.3) CKD (chronic kidney disease) stage 3, GFR 30-59 ml/min  Patient creatinine has been stable.    (I10) Essential hypertension  Blood pressure is currently well-controlled.  He is on propranolol 20 mg orally daily.  Also on Lasix 20 mg orally daily.    (K70.30) Alcoholic cirrhosis of liver without ascites (H)  Strongly emphasized importance continue to follow with hematology.    (C92.01) Acute myeloid leukemia in remission (H)  The patient today most recent bone marrow biopsy.  There is no evidence of recurrence of leukemia.      The patient is ready to learn, no apparent learning barriers were identified.  Diagnosis and treatment plans were explained to the patient. The patient expressed understanding of the content. The patient asked appropriate questions. The patient questions were answered to his satisfaction.    Chart documentation with Dragon Voice recognition Software. Although reviewed after completion, some words and grammatical errors may remain.    Again, thank you for allowing me to participate in the care of your patient.        Sincerely,        Dangelo Rowe MD

## 2018-07-30 ENCOUNTER — TELEPHONE (OUTPATIENT)
Dept: AUDIOLOGY | Facility: CLINIC | Age: 58
End: 2018-07-30

## 2018-07-31 DIAGNOSIS — K21.9 GASTROESOPHAGEAL REFLUX DISEASE WITHOUT ESOPHAGITIS: ICD-10-CM

## 2018-08-01 ENCOUNTER — TELEPHONE (OUTPATIENT)
Dept: ONCOLOGY | Facility: CLINIC | Age: 58
End: 2018-08-01

## 2018-08-01 RX ORDER — PROPRANOLOL HYDROCHLORIDE 20 MG/1
20 TABLET ORAL 2 TIMES DAILY
Qty: 90 TABLET | Refills: 3 | Status: SHIPPED | OUTPATIENT
Start: 2018-08-01 | End: 2019-02-07

## 2018-08-01 NOTE — TELEPHONE ENCOUNTER
Routing refill request to provider for review/approval because:  BP not at goal     Terra RODRIGUEZ RN

## 2018-08-01 NOTE — TELEPHONE ENCOUNTER
Please notify prescription sent to pharmacy.  BP was normal twice and mildly high once.   He should follow this and record it as instructed.   Parrish Funk

## 2018-08-01 NOTE — TELEPHONE ENCOUNTER
Patient has rescheduled several appointments. Per Dr. Rowe, patient can come in to see him if symptoms are worsening or if there are changes in symptoms. Patient notified.  Kellie Putnam RN

## 2018-08-02 ENCOUNTER — ALLIED HEALTH/NURSE VISIT (OUTPATIENT)
Dept: AUDIOLOGY | Facility: CLINIC | Age: 58
End: 2018-08-02
Payer: COMMERCIAL

## 2018-08-02 ENCOUNTER — OFFICE VISIT (OUTPATIENT)
Dept: PALLIATIVE MEDICINE | Facility: CLINIC | Age: 58
End: 2018-08-02
Attending: FAMILY MEDICINE
Payer: COMMERCIAL

## 2018-08-02 VITALS
DIASTOLIC BLOOD PRESSURE: 69 MMHG | HEART RATE: 74 BPM | SYSTOLIC BLOOD PRESSURE: 123 MMHG | WEIGHT: 190 LBS | BODY MASS INDEX: 31.62 KG/M2

## 2018-08-02 DIAGNOSIS — M54.16 LUMBAR RADICULOPATHY: ICD-10-CM

## 2018-08-02 DIAGNOSIS — G89.29 CHRONIC NECK AND BACK PAIN: ICD-10-CM

## 2018-08-02 DIAGNOSIS — F10.20 UNCOMPLICATED ALCOHOL DEPENDENCE (H): ICD-10-CM

## 2018-08-02 DIAGNOSIS — M25.562 CHRONIC PAIN OF LEFT KNEE: ICD-10-CM

## 2018-08-02 DIAGNOSIS — M54.9 CHRONIC NECK AND BACK PAIN: ICD-10-CM

## 2018-08-02 DIAGNOSIS — M15.0 PRIMARY OSTEOARTHRITIS INVOLVING MULTIPLE JOINTS: ICD-10-CM

## 2018-08-02 DIAGNOSIS — K70.30 ALCOHOLIC CIRRHOSIS OF LIVER WITHOUT ASCITES (H): ICD-10-CM

## 2018-08-02 DIAGNOSIS — G89.29 CHRONIC PAIN OF LEFT KNEE: ICD-10-CM

## 2018-08-02 DIAGNOSIS — H90.3 SENSORINEURAL HEARING LOSS, ASYMMETRICAL: Primary | ICD-10-CM

## 2018-08-02 DIAGNOSIS — M62.838 MUSCLE SPASM: ICD-10-CM

## 2018-08-02 DIAGNOSIS — M79.18 MYOFASCIAL PAIN: ICD-10-CM

## 2018-08-02 DIAGNOSIS — M54.12 CERVICAL RADICULOPATHY: Primary | ICD-10-CM

## 2018-08-02 DIAGNOSIS — C92.01 ACUTE MYELOID LEUKEMIA IN REMISSION (H): ICD-10-CM

## 2018-08-02 DIAGNOSIS — M54.2 CHRONIC NECK AND BACK PAIN: ICD-10-CM

## 2018-08-02 DIAGNOSIS — M70.61 TROCHANTERIC BURSITIS OF RIGHT HIP: ICD-10-CM

## 2018-08-02 DIAGNOSIS — F17.200 TOBACCO DEPENDENCE SYNDROME: ICD-10-CM

## 2018-08-02 PROCEDURE — 99207 ZZC NO CHARGE LOS: CPT | Performed by: AUDIOLOGIST

## 2018-08-02 PROCEDURE — 99205 OFFICE O/P NEW HI 60 MIN: CPT | Performed by: ANESTHESIOLOGY

## 2018-08-02 PROCEDURE — 92592 HC HEARING AID CHECK, MONAURAL: CPT | Performed by: AUDIOLOGIST

## 2018-08-02 RX ORDER — BACLOFEN 10 MG/1
TABLET ORAL
Qty: 90 TABLET | Refills: 1 | Status: SHIPPED | OUTPATIENT
Start: 2018-08-02 | End: 2019-01-07

## 2018-08-02 ASSESSMENT — PAIN SCALES - GENERAL: PAINLEVEL: WORST PAIN (10)

## 2018-08-02 NOTE — PROGRESS NOTES
57 year old male comes in for in office repair of his 2014 Phonak [employer]ude[employer] [id number] hearing aids. He reports he was fit with the hearing aids at a clinic in [place] but can not remember the name.     Examination reveals the right  wire is broken. Replaced with #2 xS right and large open dome. Verified hearing aid functionality.      See chart in the hearing aid room.     Ai Min M.A. F-AAA, #[id number]

## 2018-08-02 NOTE — PROGRESS NOTES
[facility] Consultation    Date of visit: 8/2/2018    Reason for consultation:    Abhijeet Mccauley is a 57 year old male who is seen in consultation today at the request of his provider, Parrish Funk MD for evaluation of chronic pain.    Primary Care Provider is Parrish Funk.  Pain medications are being prescribed by:  None at this time.    Please see the [facility] health questionnaire which the patient completed and reviewed with me in detail.    Chief Complaint:    Chief Complaint   Patient presents with     Pain       Pain history:  Abhijeet Mccauley is a 57 year old male who first started having problems with pain in his back and joints about 10 years ago around the time he was diagnosed with Acute Myeloid Leukemia (currently in remission).  Of note, he has a medical history significant for alcohol abuse, alcoholic cirrhosis, chronic kidney disease, stage 3, COPD, and recent presumed GI bleed requiring blood transfusion with negative upper GI and colonoscopy on 3/16/18 per report.    His worst pain is in the back of his neck that he feels travels down his spine.  He complains of intermittent numbness in his arms but denies pain traveling down the arms. He states that he will occasionally drop things with his hands.      His next worst pain is in the right hip that hurts worse when he touches it and sends pain down his leg.  The pain will travel down to the tip of his toes. He does have some pain across the lower back, about the same on both sides.  He complains of numbness and tingling in the legs and feet, mostly in the feet.    He also complains of continued left knee pain after knee replacement about four years ago.  He states that he did follow up with Orthopedics and was told there was nothing surgical, per patient.    His pain is described as constant, aching, sharp, and shooting.    Pain rating: intensity ranges from 9/10 to 10/10, and Averages 10/10  "on a 0-10 scale.  Aggravating factors include: prolonged walking, walking stairs, standing, bending  Relieving factors include: ibuprofen, \"nothing helps\"  Any bowel or bladder incontinence: denies changes    Current treatments include:  Ibuprofen 400 mg Q6H prn  Trazodone 50 mg QHS  Tylenol 500 mg 1-2 tabs Q6H prn    Has used Marijuana as recently as three months ago - states it helped with his pain    [place] Board of Pharmacy Data Base Reviewed:    YES; no current opioids    Previous medication treatments included:  Hydrocodone - rash  Flexeril - hives  Robaxin - made face break out  Gabapentin - made face break out    Other treatments have included:  Abhijeet Mccauley has been seen at a pain clinic in the past.  [facility] - stopped pain meds due to marijuana use  PT: years ago  Chiropractic: denies  Acupuncture: years ago - not helpful  TENs Unit: tried - helped but not using    Injections:   - neck injections years ago    Past Medical History:  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)     s/p chemo, no bone marrow transplant     Chronic obstructive pulmonary disease 5/17/2018     COPD (chronic obstructive pulmonary disease) (H)      History of pulmonary embolus (PE)      Hypertension      PUD (peptic ulcer disease)      Tobacco dependence      Ulcerative colitis (H)      Past Surgical History:  Past Surgical History:   Procedure Laterality Date     AS TOTAL KNEE ARTHROPLASTY Left      BONE MARROW BIOPSY, BONE SPECIMEN, NEEDLE/TROCAR N/A 6/12/2018    Procedure: BIOPSY BONE MARROW;  Bone Marrow Biopsy;  Surgeon: Demar Sapp MD;  Location: [facility]     ESOPHAGOSCOPY, GASTROSCOPY, DUODENOSCOPY (EGD), COMBINED N/A 2/8/2018    Procedure: COMBINED ESOPHAGOSCOPY, GASTROSCOPY, DUODENOSCOPY (EGD), BIOPSY SINGLE OR MULTIPLE;  gastroscopy with biopsies; "  Surgeon: Raz Cobb MD;  Location: [facility]     ESOPHAGOSCOPY, GASTROSCOPY, DUODENOSCOPY (EGD), COMBINED N/A 3/18/2018    Procedure: COMBINED ESOPHAGOSCOPY, GASTROSCOPY, DUODENOSCOPY (EGD);;  Surgeon: Raz Cobb MD;  Location: [facility]     LACERATION REPAIR Right     Right leg     Medications:  Current Outpatient Prescriptions   Medication Sig Dispense Refill     acetaminophen (TYLENOL) 500 MG tablet Take 1-2 tablets by mouth every 6 hours as needed.       albuterol (PROAIR HFA/PROVENTIL HFA/VENTOLIN HFA) 108 (90 BASE) MCG/ACT Inhaler Inhale 2 puffs into the lungs every 4 hours as needed 1 Inhaler 11     baclofen (LIORESAL) 10 MG tablet Take 1/2 to one tablet up to three times a day 90 tablet 1     desonide (DESOWEN) 0.05 % lotion Apply topically as needed 118 mL 1     furosemide (LASIX) 20 MG tablet Take 1 tablet (20 mg) by mouth every morning 30 tablet 11     ibuprofen (ADVIL/MOTRIN) 400 MG tablet Take 1 tablet (400 mg) by mouth every 6 hours as needed for moderate pain 30 tablet 5     Melatonin ER 5 MG TBCR Take 1 tablet by mouth At Bedtime 30 tablet 11     mometasone-formoterol (DULERA) 200-5 MCG/ACT oral inhaler Inhale 2 puffs into the lungs 2 times daily        order for DME Equipment being ordered: shower chair 1 Device 0     PANTOPRAZOLE SODIUM PO Take 40 mg by mouth every morning (before breakfast)       potassium chloride SA (K-DUR/KLOR-CON M) 10 MEQ CR tablet Take 1 tablet (10 mEq) by mouth daily 90 tablet 3     propranolol (INDERAL) 20 MG tablet Take 1 tablet (20 mg) by mouth 2 times daily 90 tablet 3     SODIUM BICARBONATE PO Take 650 mg by mouth daily as needed        tiotropium (SPIRIVA) 18 MCG capsule Inhale 1 capsule into the lungs daily Using PRN       traZODone (DESYREL) 50 MG tablet Take 1 tablet (50 mg) by mouth At Bedtime 90 tablet 3     loratadine (CLARITIN) 10 MG tablet Take 1 tablet by mouth daily as needed for allergies. (Patient not taking: Reported on 8/2/2018) 30 tablet 6  "    Allergies:     Allergies   Allergen Reactions     Blood Transfusion Related (Informational Only)      Patient has a history of a clinically significant antibody against RBC antigens.  A delay in compatible RBCs may occur.     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  Tobacco use: 1/2 ppd, started mid teens  Alcohol use: admits to about 6 beers per day  Drug use: last used marijuana 3 months ago, denies other drug use  History of chemical dependency treatment: 20 years ago for EtOH    Family history:  Family History   Problem Relation Age of Onset     Hypertension Mother      Coronary Artery Disease Father      MI     Family history of headaches: n/a    Review of Systems:  Skin: negative  Eyes: visual blurring  Ears/Nose/Throat: earache, hearing loss, tinnitus, vertigo  Respiratory: Shortness of breath, wheezing, and Cough  Cardiovascular: negative  Gastrointestinal: nausea, abdominal pain, hematochezia, constipation and diarrhea  Genitourinary: negative  Musculoskeletal: back pain, neck pain, arthritis, joint pain and joint stiffness  Neurologic: headaches, local weakness, numbness or tingling of hands, numbness or tingling of feet and memory problems  Psychiatric: negative  Hematologic/Lymphatic/Immunologic: anemia and AML in remission  Endocrine: negative    Physical Exam:  Vitals:    08/02/18 1259   BP: 123/69   Pulse: 74   Weight: 86.2 kg (190 lb)     Exam:  Constitutional: alert, active, no distress, cooperative, smiling and over weight, breath smells of alcohol  Head: " normocephalic. Atraumatic.   Eyes: no redness or jaundice noted   ENT: oropharnx normal.  MMM.  Neck supple.    Cardiovascular:  No edema or JVD appreciated, palpable pulses  Respiratory: non-labored, equal expansion, audible wheezing, speaking in full sentences  Gastrointestinal: soft, obese  : deferred  Skin: no suspicious lesions or rashes  Psychiatric: mentation appears normal and affect normal/bright    Musculoskeletal exam:  Gait/Station/Posture: grossly intact, mildly wide based, intact, toe and heel walk intact  Cervical spine: good range of motion, very exaggerated response to even light palpation along the posterior aspect of the spine, paraspinal muscle tenderness and some tenderness along the articular pillars, facet loading is negative bilaterally    Thoracic spine:  Paraspinal muscle tenderness    Lumbar spine: tender lumbar paraspinal muscles and SI joints bilaterally with very exaggerated response to even light palpation, flexes well - touches toes, lateral bending and extension and lateral rotation is negative for facet loading pain    Myofascial tenderness:  Cervical, thoracic, and lumbar   Straight leg exam: causes lower back pain bilaterally  Fabrice's maneuver: causes right hip pain, equivocal for lower back pain bilaterally    Tender to palpation at the right hip greater trochanter, hip with full range of motion without instability    Left knee with well healed surgical scar, full ROM, tender at the popliteal fossa and lateral greater than medial joint space and over tendon insertions.  Palpable and audible clunking with drawer testing and medial/lateral stress on joint indicating unstable joint replacement    Neurologic exam:  CN:  Cranial nerves 2-12 are grossly intact  Motor:  5/5 UE and LE strength throughout  Reflexes:  Absent UE's bilaterally, trace LE's bilaterally  Other reflexes:  Plantar response downgoing bilaterally, no clonus, Garcia's negative   Sensory:  (upper and lower  extremities):   Light touch: decreased left LE in stocking distribution, intact all other areas   Vibration: not assessed    Diagnostic tests:  MRI of the lumbar spine was completed on 4/24/09 showing:  Findings: There are 5 lumbar-type vertebrae assumed for the purposes  of this dictation.  The tip of the conus medullaris is at L2.  The  lumbar vertebral column appears normally aligned.  There is  disc  height narrowing at L1-L2, L4-L5 and L5-S1. There is Schmorl's node in  the anterior inferior endplate of L1.  There are bony degenerative  changes in upper lumbar spine and lower thoracic spine.      On a level by level basis:  T12-L1: Broad-based posterior disc bulge with mild spinal canal  stenosis and no significant neural foraminal narrowing.     L1-L2. Broad-based posterior disc bulge with mild spinal canal  stenosis and no significant neural foraminal narrowing.     L2-3: No significant spinal canal stenosis or neuroforaminal  narrowing.     L3-4: No significant spinal canal stenosis or neural foraminal  narrowing.     L4-5: Mild broad-based posterior disc bulge without significant spinal  canal stenosis. There is facet sclerosis and moderate left  neuroforaminal narrowing.     L5-S1: There is a left paracentral broad-based bulge with mild spinal  canal stenosis.   There is facet sclerosis and mild neuroforaminal  narrowing.     Impression: Mild degenerative changes of the lumbar spine as detailed  Above.    MRI of the Thoracic Spine without contrast  Findings:  The thoracic vertebral column appears in normal alignment. There is  thoracic kyphosis. There is loss of disk height diffusely.  There are  diffuse degenerative changes of the spine. The spinal cord contour and  signal pattern appear within normal limits.  There is posterior disc  bulging at every level from T3-T4 through T8-T9. There is posterior  disc bulging at T12-L1 as well.      T3-T4: Broad-based posterior disc bulge without significant  spinal  canal narrowing or neuroforaminal stenosis.      T4-T5: Broad-based posterior disc bulge with mild to moderate spinal  canal stenosis.   There is facet sclerosis and mild neuroforaminal  narrowing.     T5-T6: Broad-based posterior disc bulge with mild spinal canal  stenosis. Facet sclerosis and mild neuroforaminal narrowing.     T6-T7: Broad-based posterior disc bulge with mild spinal canal  stenosis. Facet sclerosis and mild neuroforaminal narrowing.     T7-T8: Broad-based posterior disc bulge with mild to moderate spinal  canal stenosis. Facet sclerosis and mild neuroforaminal narrowing.     T8-T9: Broad-based posterior disc bulge with mild spinal canal  stenosis. Facet sclerosis and mild neuroforaminal narrowing.     T9-T10: No significant spinal canal stenosis or neuroforaminal  narrowing.     T10-T11: No significant spinal canal stenosis or neuroforaminal  narrowing.     T11-T12: Broad-based posterior disc bulge with mild spinal canal  stenosis. Facet sclerosis and mild neuroforaminal narrowing.     Impression: Degenerative changes of the thoracic spine with posterior  disc bulging causing mild spinal canal narrowing at multiple levels as  detailed above.    MRI of the Cervical Spine with and without contrast  Findings:  The cervical vertebrae appear normally aligned.  There is disc space  narrowing at C3/C4 and C5/C6 with mild disc desiccation at this  level..  There is no definite abnormal  signal within the cervical  spinal cord at any level.  The findings on a level by level basis are  as follows:     C2-3:  Disc osteophyte complex with mild spinal stenosis. There is  mild bilateral foraminal narrowing..     C3-4:  Disc osteophyte complex without evidence of spinal stenosis.  Mild right foraminal narrowing..     C4-5:  Disc osteophyte complex without evidence of spinal stenosis.  Mild right foraminal narrowing..     C5-6:  Disc osteophyte complex without evidence of spinal stenosis.  Mild right  foraminal narrowing.     C6-7:  There is no focal abnormality.     C7-T1: There is no focal abnormality.     No definite abnormality is noted of the visualized paraspinous  tissues.       Impression:    1. No evidence of inflammatory or infectious process in the cervical  spine..  2. Mild multilevel degenerative disease of the cervical spine as  described above..     Other testing (labs, diagnostics):  Component Value Flag Ref Range Units Status Collected Lab   WBC 11.0  4.0 - 11.0 10e9/L Final 07/19/2018  2:52 PM 57   RBC Count 3.79 (L) 4.4 - 5.9 10e12/L Final 07/19/2018  2:52 PM 59   Hemoglobin 11.6 (L) 13.3 - 17.7 g/dL Final 07/19/2018  2:52 PM 59   Hematocrit 37.3 (L) 40.0 - 53.0 % Final 07/19/2018  2:52 PM 59   MCV 98  78 - 100 fl Final 07/19/2018  2:52 PM 59   MCH 30.6  26.5 - 33.0 pg Final 07/19/2018  2:52 PM 59   MCHC 31.1 (L) 31.5 - 36.5 g/dL Final 07/19/2018  2:52 PM 59   RDW 23.7 (H) 10.0 - 15.0 % Final 07/19/2018  2:52 PM 59   Platelet Count 214  150 - 450 10e9/L Final 07/19/2018  2:52 PM 59   Diff Method     Final 07/19/2018  2:52 PM 59   Automated Method   % Neutrophils 59.5   % Final 07/19/2018  2:52 PM 59   % Lymphocytes 20.9   % Final 07/19/2018  2:52 PM 59   % Monocytes 15.0   % Final 07/19/2018  2:52 PM 59   % Eosinophils 3.1   % Final 07/19/2018  2:52 PM 59   % Basophils 0.9   % Final 07/19/2018  2:52 PM 59   % Immature Granulocytes 0.6   % Final 07/19/2018  2:52 PM 59   Nucleated RBCs 0  0 /100 Final 07/19/2018  2:52 PM 59   Absolute Neutrophil 6.5  1.6 - 8.3 10e9/L Final 07/19/2018  2:52 PM 59   Absolute Lymphocytes 2.3  0.8 - 5.3 10e9/L Final 07/19/2018  2:52 PM 59   Absolute Monocytes 1.7 (H) 0.0 - 1.3 10e9/L Final 07/19/2018  2:52 PM 59   Absolute Eosinophils 0.3  0.0 - 0.7 10e9/L Final 07/19/2018  2:52 PM 59   Absolute Basophils 0.1  0.0 - 0.2 10e9/L Final 07/19/2018  2:52 PM 59   Abs Immature Granulocytes 0.1  0 - 0.4 10e9/L Final 07/19/2018  2:52 PM 59   Absolute Nucleated RBC 0.0     Final 07/19/2018  2:52 PM 59     Component Collected Lab   Copath Report 06/12/2018  7:44 AM 88   Patient Name: ALVA MARISCAL   MR#: [id number]   Specimen #: [id number]   Collected: 6/12/2018 07:44   Received: 6/12/2018 14:38   Reported: 6/13/2018 12:03   Ordering Phy(s): MARY ELIAS     For improved result formatting, select 'View Enhanced Report Format' under    Linked Documents section.   _________________________________________     SPECIMEN(S):   Bone Marrow, Right     INTERPRETATION:   Bone Marrow, Right:        No increase in myeloid blasts and no abnormal myeloid blast   population     COMMENT:   Final interpretation requires correlation with results of other ancillary   studies, morphologic and clinical   features.     RESULTS:   Percentages reported below are based on the total number of CD45 positive   viable leukocytes. If applicable,   percentage of plasma cells is from total viable nucleated cells.   1.3% cells in the blast gate (CD45 dim and low side scatter blast gate).   There is no aberrant immunophenotype   on the myeloid blasts.     0.5% CD34 positive blasts        Component Value Flag Ref Range Units Status Collected Lab   Sodium 139  133 - 144 mmol/L Final 05/31/2018  2:52 PM 59   Potassium 3.9  3.4 - 5.3 mmol/L Final 05/31/2018  2:52 PM 59   Chloride 108  94 - 109 mmol/L Final 05/31/2018  2:52 PM 59   Carbon Dioxide 23  20 - 32 mmol/L Final 05/31/2018  2:52 PM 59   Anion Gap 8  3 - 14 mmol/L Final 05/31/2018  2:52 PM 59   Glucose 79  70 - 99 mg/dL Final 05/31/2018  2:52 PM 59   Urea Nitrogen 17  7 - 30 mg/dL Final 05/31/2018  2:52 PM 59   Creatinine 1.80 (H) 0.66 - 1.25 mg/dL Final 05/31/2018  2:52 PM 59   GFR Estimate 39 (L) >60 mL/min/1.7m2 Final 05/31/2018  2:52 PM 59   Comment:   Non  GFR Calc   GFR Estimate If Black 47 (L) >60 mL/min/1.7m2 Final 05/31/2018  2:52 PM 59   Comment:   African American GFR Calc   Calcium 9.4  8.5 - 10.1 mg/dL Final 05/31/2018   2:52 PM 59   Bilirubin Total 1.8 (H) 0.2 - 1.3 mg/dL Final 05/31/2018  2:52 PM 59   Albumin 3.5  3.4 - 5.0 g/dL Final 05/31/2018  2:52 PM 59   Protein Total 7.6  6.8 - 8.8 g/dL Final 05/31/2018  2:52 PM 59   Alkaline Phosphatase 239 (H) 40 - 150 U/L Final 05/31/2018  2:52 PM 59   ALT 23  0 - 70 U/L Final 05/31/2018  2:52 PM 59   AST 37  0 - 45 U/L Final 05/31/2018  2:52 PM 59     Screening tools:  DIRE Score for ongoing opioid management is calculated as follows:    Diagnosis = 1    Intractability = 2    Risk: Psych = 2  Chem Hlth = 1  Reliability = 2  Social = 2    Efficacy = 2    Total DIRE Score = 12 (14 or higher predicts good candidate for ongoing opioid management; 13 or lower predicts poor candidate for opioid management)     Assessment:  1.  Chronic pain syndrome  2.  Chronic neck pain  3.  Chronic lower back pain  4.  Right hip bursitis  5.  Myofascial pain (muscle pain)  6.  Alcohol use/abuse  7.  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  Sacroiliac joint pain  9.  AML in remission  10.  Peripheral neuropathy    Abhijeet Mccauley is a 57 year old male who presents with the complaints of chronic neck and back pain as well as right hip and left knee pain. His neck pain seems more myofascial in nature, although his MRI from 2008 did show degenerative disc disease.  His lower back pain is also mechanical in nature but there is a radicular component.  His most recent MRI was from 2009.  He has right hip bursitis and an unstable left knee replacement that would benefit from a revision.  His excessive alcohol use, cirrhosis, and chronic kidney disease does limit medication treatments.  Our treatment plan is as outlined below.    Plan:  Diagnosis reviewed, treatment option addressed, and risk/benefits discussed.  Self-care instructions given.  I am recommending a multidisciplinary treatment plan to help this patient better manage his pain.      1. Physical Therapy: [facility] - physical therapy [facility] and  treat  2. Clinical Health Psychologist to address issues of relaxation, behavioral change, coping style, and other factors important to improvement: deferred  3. Diagnostic Studies:   1. Updated cervical and lumbar MRI ordered today  4. Medication Management:   1. Continue careful use of Ibuprofen due to chronic kidney disease  2. Tylenol 500 mg 1-2 tabs three times a day - careful use due to cirrhosis  3. Trial of Baclofen 10 mg 1/2 to one tablet three times a day for muscle spasm  4. consider Medical Cannabis  5.   Further procedures recommended:    1.  May benefit from cervical or lumbar procedures depending on results of MRI scans   2.  Cervical trigger point injections may be helpful for muscle pain - ordered today   3.  Right hip bursa injection may be helpful for hip pain - ordered today   4.  Sacroiliac joint injections may be helpful as well  6.   Acupuncture: deferred  7.   Urine toxicology screen today: deferred - not prescribing opioids   8.   Recommendations/follow-up for PCP:  Continue current treatment - patient would benefit from orthopedic eval and treat for unstable left knee replacement  9.   Smoking cessation and smoking effects on healing and chronic pain discussed with patient  10.  Alcohol cessation would be very beneficial as well - discussed with patient  11.  Release of information: n/a  12.  Follow up: 3 months or next available - will contact patient with MRI results    Total time spent was 60 minutes, and more than 50% of face to face time was spent in counseling and/or coordination of care regarding diagnosis, principles of multidisciplinary care, medication management, and interventional procedures.    Landen Quiñones MD  [facility]

## 2018-08-02 NOTE — MR AVS SNAPSHOT
After Visit Summary   8/2/2018    Abhijeet Mccauley    MRN: [id number]           Patient Information     Date Of Birth          1960        Visit Information        Provider Department      8/2/2018 2:00 PM Ai Min AuD [facility]        Today's Diagnoses     Sensorineural hearing loss, asymmetrical    -  1       Follow-ups after your visit        Future tests that were ordered for you today     Open Future Orders        Priority Expected Expires Ordered    MR Cervical Spine w/o Contrast Routine  8/2/2019 8/2/2018    MR Lumbar Spine w/o Contrast Routine  8/2/2019 8/2/2018            Who to contact     If you have questions or need follow up information about today's clinic visit or your schedule please contact [facility] directly at [phone].  Normal or non-critical lab and imaging results will be communicated to you by MyChart, letter or phone within 4 business days after the clinic has received the results. If you do not hear from us within 7 days, please contact the clinic through MyChart or phone. If you have a critical or abnormal lab result, we will notify you by phone as soon as possible.  Submit refill requests through [employer] or call your pharmacy and they will forward the refill request to us. Please allow 3 business days for your refill to be completed.          Additional Information About Your Visit        Care EveryWhere ID     This is your Care EveryWhere ID. This could be used by other organizations to access your [place] medical records  [id number]         Blood Pressure from Last 3 Encounters:   08/02/18 123/69   07/19/18 143/64   06/15/18 130/56    Weight from Last 3 Encounters:   08/02/18 190 lb (86.2 kg)   07/19/18 183 lb 11.2 oz (83.3 kg)   06/12/18 184 lb (83.5 kg)              We Performed the Following     HEARING AID CHECK, MONAURAL          Today's Medication Changes          These changes are accurate as of 8/2/18  4:17 PM.  If  you have any questions, ask your nurse or doctor.               Start taking these medicines.        Dose/Directions    baclofen 10 MG tablet   Commonly known as:  LIORESAL   Used for:  Muscle spasm   Started by:  Landen Calloway MD        Take 1/2 to one tablet up to three times a day   Quantity:  90 tablet   Refills:  1            Where to get your medicines      These medications were sent to [facility] - [place], [place] - [address]  [address] [address]     Phone:  [phone]     baclofen 10 MG tablet                Primary Care Provider Office Phone # Fax #    Parrish Kristopher Funk [phone]       [address] [address]        Equal Access to Services     JORGE COOL : Hadii sameera mcknight hadasho Soomaali, waaxda luqadaha, qaybta kaalmada adeegyada, song chowdhury . So [facility] [phone].    ATENCIÓN: Si habla español, tiene a uriostegui disposición servicios gratuitos de asistencia lingüística. [facility] [phone].    We comply with applicable federal civil rights laws and [place] laws. We do not discriminate on the basis of race, color, national origin, age, disability, sex, sexual orientation, or gender identity.            Thank you!     Thank you for choosing [facility]  for your care. Our goal is always to provide you with excellent care. Hearing back from our patients is one way we can continue to improve our services. Please take a few minutes to complete the written survey that you may receive in the mail after your visit with us. Thank you!             Your Updated Medication List - Protect others around you: Learn how to safely use, store and throw away your medicines at www.disposemymeds.org.          This list is accurate as of 8/2/18  4:17 PM.  Always use your most recent med list.                   Brand Name Dispense Instructions for use Diagnosis    acetaminophen 500 MG tablet    TYLENOL      Take 1-2 tablets by mouth every 6 hours as needed.        albuterol 108 (90 Base) MCG/ACT Inhaler    PROAIR HFA/PROVENTIL HFA/VENTOLIN HFA    1 Inhaler    Inhale 2 puffs into the lungs every 4 hours as needed    Mild intermittent asthma without complication       baclofen 10 MG tablet    LIORESAL    90 tablet    Take 1/2 to one tablet up to three times a day    Muscle spasm       desonide 0.05 % lotion    DESOWEN    118 mL    Apply topically as needed    Rosacea       furosemide 20 MG tablet    LASIX    30 tablet    Take 1 tablet (20 mg) by mouth every morning    Osteoarthritis of spine without myelopathy or radiculopathy, sacral and sacrococcygeal region       ibuprofen 400 MG tablet    ADVIL/MOTRIN    30 tablet    Take 1 tablet (400 mg) by mouth every 6 hours as needed for moderate pain    Osteoarthritis of spine without myelopathy or radiculopathy, sacral and sacrococcygeal region       loratadine 10 MG tablet    CLARITIN    30 tablet    Take 1 tablet by mouth daily as needed for allergies.    Allergic rhinitis       Melatonin ER 5 MG Tbcr     30 tablet    Take 1 tablet by mouth At Bedtime    Gastroesophageal reflux disease without esophagitis       mometasone-formoterol 200-5 MCG/ACT oral inhaler    DULERA     Inhale 2 puffs into the lungs 2 times daily        order for DME     1 Device    Equipment being ordered: shower chair    Acute myeloid leukemia in remission (H)       PANTOPRAZOLE SODIUM PO      Take 40 mg by mouth every morning (before breakfast)        potassium chloride SA 10 MEQ CR tablet    K-DUR/KLOR-CON M    90 tablet    Take 1 tablet (10 mEq) by mouth daily    Gastroesophageal reflux disease without esophagitis       propranolol 20 MG tablet    INDERAL    90 tablet    Take 1 tablet (20 mg) by mouth 2 times daily    Gastroesophageal reflux disease without esophagitis       SODIUM BICARBONATE PO      Take 650 mg by mouth daily as needed        tiotropium 18 MCG capsule    SPIRIVA     Inhale 1  capsule into the lungs daily Using PRN        traZODone 50 MG tablet    DESYREL    90 tablet    Take 1 tablet (50 mg) by mouth At Bedtime    Osteoarthritis of spine without myelopathy or radiculopathy, sacral and sacrococcygeal region

## 2018-08-02 NOTE — MR AVS SNAPSHOT
After Visit Summary   8/2/2018    Abhijeet Mccauley    MRN: [id number]           Patient Information     Date Of Birth          1960        Visit Information        Provider Department      8/2/2018 1:00 PM Landen Calloway MD [facility]        Today's Diagnoses     Cervical radiculopathy    -  1    Lumbar radiculopathy        Muscle spasm        Myofascial pain        Trochanteric bursitis of right hip        Chronic neck and back pain          Care Instructions    Assessment:  1.  Chronic pain syndrome  2.  Chronic neck pain  3.  Chronic lower back pain  4.  Right hip bursitis  5.  Myofascial pain (muscle pain)  6.  Alcohol use/abuse  7.  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  Sacroiliac joint pain  9.  AML in remission  10.  Peripheral neuropathy      Plan:  Diagnosis reviewed, treatment option addressed, and risk/benefits discussed.  Self-care instructions given.  I am recommending a multidisciplinary treatment plan to help this patient better manage his pain.      1. Physical Therapy: [facility] - physical therapy eval and treat  2. Clinical Health Psychologist to address issues of relaxation, behavioral change, coping style, and other factors important to improvement: deferred  3. Diagnostic Studies:   1. Updated cervical and lumbar MRI ordered today  4. Medication Management:   1. Continue careful use of Ibuprofen due to chronic kidney disease  2. Tylenol 500 mg 1-2 tabs three times a day - careful use due to cirrhosis  3. Trial of Baclofen 10 mg 1/2 to one tablet three times a day for muscle spasm  4. consider Medical Cannabis forms given today  5.   Further procedures recommended:    1.  May benefit from cervical or lumbar procedures depending on results of MRI scans   2.  Cervical trigger point injections may be helpful for muscle pain   3.  Right hip bursa injection may be helpful for hip pain   4.  Sacroiliac joint injections may be helpful as  well  6.   Acupuncture: deferred  7.   Urine toxicology screen today: deferred - not prescribing opioids   8.   Recommendations/follow-up for PCP:  Continue current treatment - patient would benefit from orthopedic eval and treat for unstable left knee replacement  9.   Smoking cessation and smoking effects on healing and chronic pain discussed with patient  10 .  Alcohol cessation would be very beneficial as well - discussed with patient  11.  Release of information: n/a  12.  Follow up: 3 months or next available - will contact patient with MRI results      ----------------------------------------------------------------  Nurse Triage line:  [phone]   Call this number with any questions or concerns. You may leave a detailed message anytime. Calls are typically returned Monday through Friday between 8 AM and 4:30 PM. We usually get back to you within 2 business days depending on the issue/request.       Medication refills:    For non-narcotic medications, call your pharmacy directly to request a refill. The pharmacy will contact the [facility] for authorization. Please allow 3-4 days for these refills to be processed.     For narcotic refills, call the nurse triage line or send a [employer] message. Please contact us 7-10 days before your refill is due. The message MUST include the name of the specific medication(s) requested and how you would like to receive the prescription(s). The options are as follows:    Pain Clinic staff can mail the prescription to your pharmacy. Please tell us the name of the pharmacy.    You may pick the prescription up at the Pain Clinic (tell us the location) or during a clinic visit with your pain provider    Pain Clinic staff can deliver the prescription to the [facility] pharmacy in the clinic building. Please tell us the location.      Scheduling number: [phone].  Call this number to schedule or change appointments.    We believe regular attendance is key to your  success in our program.    Any time you are unable to keep your appointment we ask that you call us at least 24 hours in advance to let us know. This will allow us to offer the appointment time to another patient.               Follow-ups after your visit        Additional Services     [facility] PT, HAND, AND CHIROPRACTIC REFERRAL           PAIN INJECTION EVAL/TREAT/FOLLOW UP                 Future tests that were ordered for you today     Open Future Orders        Priority Expected Expires Ordered    MR Cervical Spine w/o Contrast Routine  8/2/2019 8/2/2018    MR Lumbar Spine w/o Contrast Routine  8/2/2019 8/2/2018            Who to contact     If you have questions or need follow up information about today's clinic visit or your schedule please contact [facility] directly at [phone].  Normal or non-critical lab and imaging results will be communicated to you by MyChart, letter or phone within 4 business days after the clinic has received the results. If you do not hear from us within 7 days, please contact the clinic through MyChart or phone. If you have a critical or abnormal lab result, we will notify you by phone as soon as possible.  Submit refill requests through [employer] or call your pharmacy and they will forward the refill request to us. Please allow 3 business days for your refill to be completed.          Additional Information About Your Visit        Care EveryWhere ID     This is your Care EveryWhere ID. This could be used by other organizations to access your [facility] medical records  [id number]        Your Vitals Were     Pulse BMI (Body Mass Index)                74 31.62 kg/m2           Blood Pressure from Last 3 Encounters:   08/02/18 123/69   07/19/18 143/64   06/15/18 130/56    Weight from Last 3 Encounters:   08/02/18 86.2 kg (190 lb)   07/19/18 83.3 kg (183 lb 11.2 oz)   06/12/18 83.5 kg (184 lb)              We Performed the Following     [facility] PT, HAND, AND  CHIROPRACTIC REFERRAL     PAIN INJECTION EVAL/TREAT/FOLLOW UP          Today's Medication Changes          These changes are accurate as of 8/2/18  2:06 PM.  If you have any questions, ask your nurse or doctor.               Start taking these medicines.        Dose/Directions    baclofen 10 MG tablet   Commonly known as:  LIORESAL   Used for:  Muscle spasm   Started by:  Landen Calloway MD        Take 1/2 to one tablet up to three times a day   Quantity:  90 tablet   Refills:  1            Where to get your medicines      These medications were sent to [facility] [place], [place] - [address]  [address] [address]     Phone:  [phone]     baclofen 10 MG tablet                Primary Care Provider Office Phone # Fax #    Parrish Kristopher Funk [phone] [address] [address]        Equal Access to Services     JORGE COOL : Hadii sameera mcknight hadasho Soomaali, waaxda luqadaha, qaybta kaalmada adeegyada, waxay manjeetin hayvincentn cata chowdhury . So [facility] [phone].    ATENCIÓN: Si habla español, tiene a uriostegui disposición servicios gratuitos de asistencia lingüística. Claudy al [phone].    We comply with applicable federal civil rights laws and [place] laws. We do not discriminate on the basis of race, color, national origin, age, disability, sex, sexual orientation, or gender identity.            Thank you!     Thank you for choosing [facility]  for your care. Our goal is always to provide you with excellent care. Hearing back from our patients is one way we can continue to improve our services. Please take a few minutes to complete the written survey that you may receive in the mail after your visit with us. Thank you!             Your Updated Medication List - Protect others around you: Learn how to safely use, store and throw away your medicines at www.disposemymeds.org.          This list is accurate  as of 8/2/18  2:06 PM.  Always use your most recent med list.                   Brand Name Dispense Instructions for use Diagnosis    acetaminophen 500 MG tablet    TYLENOL     Take 1-2 tablets by mouth every 6 hours as needed.        albuterol 108 (90 Base) MCG/ACT Inhaler    PROAIR HFA/PROVENTIL HFA/VENTOLIN HFA    1 Inhaler    Inhale 2 puffs into the lungs every 4 hours as needed    Mild intermittent asthma without complication       baclofen 10 MG tablet    LIORESAL    90 tablet    Take 1/2 to one tablet up to three times a day    Muscle spasm       desonide 0.05 % lotion    DESOWEN    118 mL    Apply topically as needed    Rosacea       furosemide 20 MG tablet    LASIX    30 tablet    Take 1 tablet (20 mg) by mouth every morning    Osteoarthritis of spine without myelopathy or radiculopathy, sacral and sacrococcygeal region       ibuprofen 400 MG tablet    ADVIL/MOTRIN    30 tablet    Take 1 tablet (400 mg) by mouth every 6 hours as needed for moderate pain    Osteoarthritis of spine without myelopathy or radiculopathy, sacral and sacrococcygeal region       loratadine 10 MG tablet    CLARITIN    30 tablet    Take 1 tablet by mouth daily as needed for allergies.    Allergic rhinitis       Melatonin ER 5 MG Tbcr     30 tablet    Take 1 tablet by mouth At Bedtime    Gastroesophageal reflux disease without esophagitis       mometasone-formoterol 200-5 MCG/ACT oral inhaler    DULERA     Inhale 2 puffs into the lungs 2 times daily        order for DME     1 Device    Equipment being ordered: shower chair    Acute myeloid leukemia in remission (H)       PANTOPRAZOLE SODIUM PO      Take 40 mg by mouth every morning (before breakfast)        potassium chloride SA 10 MEQ CR tablet    K-DUR/KLOR-CON M    90 tablet    Take 1 tablet (10 mEq) by mouth daily    Gastroesophageal reflux disease without esophagitis       propranolol 20 MG tablet    INDERAL    90 tablet    Take 1 tablet (20 mg) by mouth 2 times daily     Gastroesophageal reflux disease without esophagitis       SODIUM BICARBONATE PO      Take 650 mg by mouth daily as needed        tiotropium 18 MCG capsule    SPIRIVA     Inhale 1 capsule into the lungs daily Using PRN        traZODone 50 MG tablet    DESYREL    90 tablet    Take 1 tablet (50 mg) by mouth At Bedtime    Osteoarthritis of spine without myelopathy or radiculopathy, sacral and sacrococcygeal region

## 2018-08-02 NOTE — PATIENT INSTRUCTIONS
Assessment:  1.  Chronic pain syndrome  2.  Chronic neck pain  3.  Chronic lower back pain  4.  Right hip bursitis  5.  Myofascial pain (muscle pain)  6.  Alcohol use/abuse  7.  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  Sacroiliac joint pain  9.  AML in remission  10.  Peripheral neuropathy      Plan:  Diagnosis reviewed, treatment option addressed, and risk/benefits discussed.  Self-care instructions given.  I am recommending a multidisciplinary treatment plan to help this patient better manage his pain.      1. Physical Therapy: [facility] - physical therapy eval and treat  2. Clinical Health Psychologist to address issues of relaxation, behavioral change, coping style, and other factors important to improvement: deferred  3. Diagnostic Studies:   1. Updated cervical and lumbar MRI ordered today  4. Medication Management:   1. Continue careful use of Ibuprofen due to chronic kidney disease  2. Tylenol 500 mg 1-2 tabs three times a day - careful use due to cirrhosis  3. Trial of Baclofen 10 mg 1/2 to one tablet three times a day for muscle spasm  4. consider Medical Cannabis forms given today  5.   Further procedures recommended:    1.  May benefit from cervical or lumbar procedures depending on results of MRI scans   2.  Cervical trigger point injections may be helpful for muscle pain   3.  Right hip bursa injection may be helpful for hip pain   4.  Sacroiliac joint injections may be helpful as well  6.   Acupuncture: deferred  7.   Urine toxicology screen today: deferred - not prescribing opioids   8.   Recommendations/follow-up for PCP:  Continue current treatment - patient would benefit from orthopedic eval and treat for unstable left knee replacement  9.   Smoking cessation and smoking effects on healing and chronic pain discussed with patient  10 .  Alcohol cessation would be very beneficial as well - discussed with patient  11.  Release of information: n/a  12.  Follow up: 3 months or next available - will  contact patient with MRI results      ----------------------------------------------------------------  Nurse Triage line:  [phone]   Call this number with any questions or concerns. You may leave a detailed message anytime. Calls are typically returned Monday through Friday between 8 AM and 4:30 PM. We usually get back to you within 2 business days depending on the issue/request.       Medication refills:    For non-narcotic medications, call your pharmacy directly to request a refill. The pharmacy will contact the [facility] for authorization. Please allow 3-4 days for these refills to be processed.     For narcotic refills, call the nurse triage line or send a [employer] message. Please contact us 7-10 days before your refill is due. The message MUST include the name of the specific medication(s) requested and how you would like to receive the prescription(s). The options are as follows:    Pain Clinic staff can mail the prescription to your pharmacy. Please tell us the name of the pharmacy.    You may pick the prescription up at the Pain Clinic (tell us the location) or during a clinic visit with your pain provider    Pain Clinic staff can deliver the prescription to the [facility] pharmacy in the clinic building. Please tell us the location.      Scheduling number: [phone].  Call this number to schedule or change appointments.    We believe regular attendance is key to your success in our program.    Any time you are unable to keep your appointment we ask that you call us at least 24 hours in advance to let us know. This will allow us to offer the appointment time to another patient.

## 2018-08-03 ASSESSMENT — PATIENT HEALTH QUESTIONNAIRE - PHQ9: SUM OF ALL RESPONSES TO PHQ QUESTIONS 1-9: 10

## 2018-08-07 ENCOUNTER — HOSPITAL ENCOUNTER (OUTPATIENT)
Dept: MRI IMAGING | Facility: CLINIC | Age: 58
End: 2018-08-07
Attending: ANESTHESIOLOGY
Payer: COMMERCIAL

## 2018-08-07 ENCOUNTER — HOSPITAL ENCOUNTER (OUTPATIENT)
Dept: MRI IMAGING | Facility: CLINIC | Age: 58
Discharge: HOME | End: 2018-08-07
Attending: ANESTHESIOLOGY | Admitting: ANESTHESIOLOGY
Payer: COMMERCIAL

## 2018-08-07 ENCOUNTER — OFFICE VISIT (OUTPATIENT)
Dept: PALLIATIVE MEDICINE | Facility: CLINIC | Age: 58
End: 2018-08-07
Payer: COMMERCIAL

## 2018-08-07 VITALS — SYSTOLIC BLOOD PRESSURE: 145 MMHG | DIASTOLIC BLOOD PRESSURE: 73 MMHG | HEART RATE: 64 BPM

## 2018-08-07 DIAGNOSIS — M25.551 CHRONIC RIGHT HIP PAIN: ICD-10-CM

## 2018-08-07 DIAGNOSIS — M70.61 TROCHANTERIC BURSITIS OF RIGHT HIP: Primary | ICD-10-CM

## 2018-08-07 DIAGNOSIS — M79.18 MYOFASCIAL PAIN: ICD-10-CM

## 2018-08-07 DIAGNOSIS — M54.16 LUMBAR RADICULOPATHY: ICD-10-CM

## 2018-08-07 DIAGNOSIS — M54.12 CERVICAL RADICULOPATHY: ICD-10-CM

## 2018-08-07 DIAGNOSIS — G89.29 CHRONIC RIGHT HIP PAIN: ICD-10-CM

## 2018-08-07 PROCEDURE — 20552 NJX 1/MLT TRIGGER POINT 1/2: CPT | Mod: 59 | Performed by: ANESTHESIOLOGY

## 2018-08-07 PROCEDURE — 72141 MRI NECK SPINE W/O DYE: CPT

## 2018-08-07 PROCEDURE — 72148 MRI LUMBAR SPINE W/O DYE: CPT

## 2018-08-07 PROCEDURE — 20610 DRAIN/INJ JOINT/BURSA W/O US: CPT | Mod: RT | Performed by: ANESTHESIOLOGY

## 2018-08-07 ASSESSMENT — PAIN SCALES - GENERAL: PAINLEVEL: WORST PAIN (10)

## 2018-08-07 NOTE — MR AVS SNAPSHOT
After Visit Summary   8/7/2018    Abhijeet Mccauley    MRN: [id number]           Patient Information     Date Of Birth          1960        Visit Information        Provider Department      8/7/2018 1:00 PM Landen Calloway MD [facility]        Care Instructions    [facility] [place]   Post Procedure Instructions    Today you had:  trigger point injections    bursa injection      Medications used:  lidocaine   bupivicaine   kenolog           Monitor the injection sites for signs and symptoms of infection-fever, chills, redness, swelling, warmth, or drainage to areas.    You may have soreness at injection sites for up to 24 hours.    You may apply ice to the painful areas to help minimize the discomfort of the needle pokes.    Do not apply heat to sites for at least 12 hours.    You may use anti-inflammatory medications or Tylenol for pain control if necessary    Pain Clinic phone number during work hours Monday-Friday:  [phone]    After hours provider line: [phone]                Follow-ups after your visit        Who to contact     If you have questions or need follow up information about today's clinic visit or your schedule please contact [facility] directly at [phone].  Normal or non-critical lab and imaging results will be communicated to you by MyChart, letter or phone within 4 business days after the clinic has received the results. If you do not hear from us within 7 days, please contact the clinic through MyChart or phone. If you have a critical or abnormal lab result, we will notify you by phone as soon as possible.  Submit refill requests through [employer] or call your pharmacy and they will forward the refill request to us. Please allow 3 business days for your refill to be completed.          Additional Information About Your Visit        Care EveryWhere ID     This is your Care EveryWhere ID.  This could be used by other organizations to access your [place] medical records  [id number]        Your Vitals Were     Pulse                   64            Blood Pressure from Last 3 Encounters:   08/07/18 145/73   08/02/18 123/69   07/19/18 143/64    Weight from Last 3 Encounters:   08/02/18 86.2 kg (190 lb)   07/19/18 83.3 kg (183 lb 11.2 oz)   06/12/18 83.5 kg (184 lb)              Today, you had the following     No orders found for display       Primary Care Provider Office Phone # Fax #    Parrish Kristopher Funk [phone] [address] [facility] [address]        Equal Access to Services     JORGE COOL : Ashwin Huddleston, wajose m underwood, qadariel kaalmamilo fournier, song ureña. So [facility] [phone].    ATENCIÓN: Si habla español, tiene a uriostegui disposición servicios gratuitos de asistencia lingüística. Llame al [phone].    We comply with applicable federal civil rights laws and [place] laws. We do not discriminate on the basis of race, color, national origin, age, disability, sex, sexual orientation, or gender identity.            Thank you!     Thank you for choosing [facility]  for your care. Our goal is always to provide you with excellent care. Hearing back from our patients is one way we can continue to improve our services. Please take a few minutes to complete the written survey that you may receive in the mail after your visit with us. Thank you!             Your Updated Medication List - Protect others around you: Learn how to safely use, store and throw away your medicines at www.disposemymeds.org.          This list is accurate as of 8/7/18  1:35 PM.  Always use your most recent med list.                   Brand Name Dispense Instructions for use Diagnosis    acetaminophen 500 MG tablet    TYLENOL     Take 1-2 tablets by mouth every 6 hours as needed.        albuterol 108 (90 Base) MCG/ACT  Inhaler    PROAIR HFA/PROVENTIL HFA/VENTOLIN HFA    1 Inhaler    Inhale 2 puffs into the lungs every 4 hours as needed    Mild intermittent asthma without complication       baclofen 10 MG tablet    LIORESAL    90 tablet    Take 1/2 to one tablet up to three times a day    Muscle spasm       desonide 0.05 % lotion    DESOWEN    118 mL    Apply topically as needed    Rosacea       furosemide 20 MG tablet    LASIX    30 tablet    Take 1 tablet (20 mg) by mouth every morning    Osteoarthritis of spine without myelopathy or radiculopathy, sacral and sacrococcygeal region       ibuprofen 400 MG tablet    ADVIL/MOTRIN    30 tablet    Take 1 tablet (400 mg) by mouth every 6 hours as needed for moderate pain    Osteoarthritis of spine without myelopathy or radiculopathy, sacral and sacrococcygeal region       loratadine 10 MG tablet    CLARITIN    30 tablet    Take 1 tablet by mouth daily as needed for allergies.    Allergic rhinitis       Melatonin ER 5 MG Tbcr     30 tablet    Take 1 tablet by mouth At Bedtime    Gastroesophageal reflux disease without esophagitis       mometasone-formoterol 200-5 MCG/ACT oral inhaler    DULERA     Inhale 2 puffs into the lungs 2 times daily        order for DME     1 Device    Equipment being ordered: shower chair    Acute myeloid leukemia in remission (H)       PANTOPRAZOLE SODIUM PO      Take 40 mg by mouth every morning (before breakfast)        potassium chloride SA 10 MEQ CR tablet    K-DUR/KLOR-CON M    90 tablet    Take 1 tablet (10 mEq) by mouth daily    Gastroesophageal reflux disease without esophagitis       propranolol 20 MG tablet    INDERAL    90 tablet    Take 1 tablet (20 mg) by mouth 2 times daily    Gastroesophageal reflux disease without esophagitis       SODIUM BICARBONATE PO      Take 650 mg by mouth daily as needed        tiotropium 18 MCG capsule    SPIRIVA     Inhale 1 capsule into the lungs daily Using PRN        traZODone 50 MG tablet    DESYREL    90 tablet     Take 1 tablet (50 mg) by mouth At Bedtime    Osteoarthritis of spine without myelopathy or radiculopathy, sacral and sacrococcygeal region

## 2018-08-07 NOTE — PATIENT INSTRUCTIONS
[facility]   Post Procedure Instructions    Today you had:  trigger point injections    bursa injection      Medications used:  lidocaine   bupivicaine   kenolog           Monitor the injection sites for signs and symptoms of infection-fever, chills, redness, swelling, warmth, or drainage to areas.    You may have soreness at injection sites for up to 24 hours.    You may apply ice to the painful areas to help minimize the discomfort of the needle pokes.    Do not apply heat to sites for at least 12 hours.    You may use anti-inflammatory medications or Tylenol for pain control if necessary    Pain Clinic phone number during work hours Monday-Friday:  [phone]    After hours provider line: [phone]

## 2018-08-09 ENCOUNTER — TELEPHONE (OUTPATIENT)
Dept: PALLIATIVE MEDICINE | Facility: CLINIC | Age: 58
End: 2018-08-09

## 2018-08-09 NOTE — TELEPHONE ENCOUNTER
Writer called pt, Pt lost his forms.  Will send new forms for pt to complete.  Pt instructed to complete forms and return to clinic Attn: aren or Torrie.    Writer reviewed with pt that this will be a few week process.      OV with Dr. Mary Anne Quiñones on 8/2/18.  1. consider Medical Cannabis forms given today    Aren Mitchell, RN  Care Coordinator   [facility] [place]

## 2018-08-09 NOTE — TELEPHONE ENCOUNTER
Reason for call:  Question / follow up  Patient called regarding (reason for call): Patient's checking about the paperwork for liquid medical marijuana that the dr had talked about at their last appt (dos 8-7-2018). Patient can't seem to find it/them. Also, patient stated the shots that Dr Mary Anne Quiñones gave him helped immensely with his neck and he wanted to let the doctor know thank you very much, because he won't see the doctor again until 11-6-2018.    Phone number to reach patient:  Home number on file [phone] (home)    Best Time:  Mostly early in the morning    Can we leave a detailed message on this number?  YES    Raine Punxsutawney  Patient Representative  [facility] [place]

## 2018-08-29 ENCOUNTER — TELEPHONE (OUTPATIENT)
Dept: PALLIATIVE MEDICINE | Facility: CLINIC | Age: 58
End: 2018-08-29

## 2018-08-29 ENCOUNTER — TELEPHONE (OUTPATIENT)
Dept: FAMILY MEDICINE | Facility: CLINIC | Age: 58
End: 2018-08-29

## 2018-08-29 DIAGNOSIS — M47.818 OSTEOARTHRITIS OF SPINE WITHOUT MYELOPATHY OR RADICULOPATHY, SACRAL AND SACROCOCCYGEAL REGION: ICD-10-CM

## 2018-08-29 NOTE — TELEPHONE ENCOUNTER
Patient calling to speak to care team, please call him on cell phone at [phone].      Ernestina MICHAEL    [facility]

## 2018-08-29 NOTE — TELEPHONE ENCOUNTER
"Requested Prescriptions   Pending Prescriptions Disp Refills     ibuprofen (ADVIL/MOTRIN) 400 MG tablet [Pharmacy Med Name: IBUPROFEN 400MG TABS]  Last Written Prescription Date:  03/30/18  Last Fill Quantity: 30,  # refills: 5   Last office visit: No previous visit found with prescribing provider:  05/24/18   Future Office Visit:     30 tablet 5     Sig: TAKE ONE TABLET BY MOUTH EVERY 6 HOURS AS NEEDED FOR MODERATE PAIN    NSAID Medications Failed    8/29/2018 12:32 PM       Failed - Blood pressure under 140/90 in past 12 months    BP Readings from Last 3 Encounters:   08/07/18 145/73   08/02/18 123/69   07/19/18 143/64          Failed - Normal serum creatinine on file in past 12 months    Recent Labs   Lab Test  05/31/18   1452   CR  1.80*          Passed - Normal ALT on file in past 12 months    Recent Labs   Lab Test  05/31/18   1452   ALT  23          Passed - Normal AST on file in past 12 months    Recent Labs   Lab Test  05/31/18   1452   AST  37          Passed - Recent (12 mo) or future (30 days) visit within the authorizing provider's specialty    Patient had office visit in the last 12 months or has a visit in the next 30 days with authorizing provider or within the authorizing provider's specialty.  See \"Patient Info\" tab in inbasket, or \"Choose Columns\" in Meds & Orders section of the refill encounter.           Passed - Patient is age 6-64 years       Passed - Normal CBC on file in past 12 months    Recent Labs   Lab Test  07/19/18   1452   WBC  11.0   RBC  3.79*   HGB  11.6*   HCT  37.3*   PLT  214       For [facility] ONLY: EDYP324 = WBC, RDNX491 = RBC            "

## 2018-08-29 NOTE — TELEPHONE ENCOUNTER
Call back to pt.      Pt stated that he is a little sore.      Pt stated that Dr. Mary Anne Quiñones had stated that his left knee had some clicking in it.  Writer reviewed notes.      Recommendations/follow-up for PCP:  Continue current treatment - patient would benefit from orthopedic eval and treat for unstable left knee replacement    Writer reviewed AVS from 8/2/18 visit.  Suggested that pt call his primary and request a ortho referral.  Pt stated that he doesn't like his PCP.  Writer suggested that pt call his clinic and request a different provider.    Pt verbalized understanding.      Dillon Mitchell, RN  Care Coordinator   [facility] [place]

## 2018-08-31 RX ORDER — IBUPROFEN 400 MG/1
TABLET, FILM COATED ORAL
Qty: 30 TABLET | Refills: 5 | OUTPATIENT
Start: 2018-08-31

## 2018-08-31 NOTE — TELEPHONE ENCOUNTER
Left message for patient to return call to clinic.    [facility] ok to deliver message below.    Racquel ESTRADA Rn

## 2018-08-31 NOTE — TELEPHONE ENCOUNTER
Routing refill request to provider for review/approval because:  Labs out of range:  bp high and cr high.    Fatou ESTRADA RN

## 2018-08-31 NOTE — TELEPHONE ENCOUNTER
NSAID's not recommended for patients with CKD, he can discuss this with his primary if he has any questions    LOPEZ Ramey CNP, covering for Dr. Funk

## 2018-09-04 ENCOUNTER — TELEPHONE (OUTPATIENT)
Dept: FAMILY MEDICINE | Facility: CLINIC | Age: 58
End: 2018-09-04

## 2018-09-04 DIAGNOSIS — M25.562 LEFT KNEE PAIN, UNSPECIFIED CHRONICITY: Primary | ICD-10-CM

## 2018-09-04 NOTE — TELEPHONE ENCOUNTER
Patient wanting a referral for left knee pain, left knee replacement 8 + years ago, Patient reports knee is painful, swollen, knee is clicking, patient reports knee gives out when walking. Pain 10/10 to left knee.   Patient states he mentioned it to his pain doctor and they recommended getting a referral form PCP to Ortho.   Pended referral.  Provider please review and advise. Thank you.

## 2018-09-04 NOTE — TELEPHONE ENCOUNTER
Patient informed of message below from provider.  Patient verbalizes understanding, agrees to plan of care, and has no further questions.

## 2018-09-13 ENCOUNTER — TELEPHONE (OUTPATIENT)
Dept: PALLIATIVE MEDICINE | Facility: CLINIC | Age: 58
End: 2018-09-13

## 2018-09-13 ENCOUNTER — MEDICAL CORRESPONDENCE (OUTPATIENT)
Dept: NUCLEAR MEDICINE | Facility: CLINIC | Age: 58
End: 2018-09-13

## 2018-09-13 NOTE — TELEPHONE ENCOUNTER
Patient only returned 1st page of [place] medical cannabis form to [facility].  Called patient and left message stating that new forms would be mailed to his home address, along with a self address, stamped, return envelope, for him to return the completed forms in.

## 2018-09-21 ENCOUNTER — TELEPHONE (OUTPATIENT)
Dept: AUDIOLOGY | Facility: CLINIC | Age: 58
End: 2018-09-21

## 2018-09-21 NOTE — TELEPHONE ENCOUNTER
Pt states he needs new hearing aid batteries and new tips after cleaning.     Pt can be contacted @: [phone] (ok to leave message)    Denise Behrendt  [facility] CSS

## 2018-09-24 NOTE — TELEPHONE ENCOUNTER
Medical cannabis forms received.  Pt did not fill out the email address.  Called pt.  He was not at home.  Will call back later.    The medical cannabis forms were placed in nursing bin.    Torrie Ash RN-BSN  [facility]

## 2018-09-24 NOTE — TELEPHONE ENCOUNTER
Patient reports he is going through batteries every few days on the left hearing aid as it is intermittent. Discussed need for hearing aid repair. He will drop of the hearing aid for repair.    Ai KUNZ, #[id number]

## 2018-09-28 ENCOUNTER — HOSPITAL ENCOUNTER (OUTPATIENT)
Dept: NUCLEAR MEDICINE | Facility: CLINIC | Age: 58
Setting detail: NUCLEAR MEDICINE
End: 2018-09-28
Attending: ORTHOPAEDIC SURGERY
Payer: COMMERCIAL

## 2018-09-28 ENCOUNTER — HOSPITAL ENCOUNTER (OUTPATIENT)
Dept: NUCLEAR MEDICINE | Facility: CLINIC | Age: 58
Setting detail: NUCLEAR MEDICINE
Discharge: HOME | End: 2018-09-28
Attending: ORTHOPAEDIC SURGERY | Admitting: ORTHOPAEDIC SURGERY
Payer: COMMERCIAL

## 2018-09-28 DIAGNOSIS — M25.562 LEFT KNEE PAIN, UNSPECIFIED CHRONICITY: ICD-10-CM

## 2018-09-28 PROCEDURE — 34300033 ZZH RX 343: Performed by: ORTHOPAEDIC SURGERY

## 2018-09-28 PROCEDURE — 78315 BONE IMAGING 3 PHASE: CPT

## 2018-09-28 PROCEDURE — A9561 TC99M OXIDRONATE: HCPCS | Performed by: ORTHOPAEDIC SURGERY

## 2018-09-28 RX ADMIN — Medication 25.1 MILLICURIE: at 07:35

## 2018-10-01 ENCOUNTER — ALLIED HEALTH/NURSE VISIT (OUTPATIENT)
Dept: AUDIOLOGY | Facility: CLINIC | Age: 58
End: 2018-10-01
Payer: COMMERCIAL

## 2018-10-01 DIAGNOSIS — H90.3 SENSORINEURAL HEARING LOSS, ASYMMETRICAL: Primary | ICD-10-CM

## 2018-10-01 PROCEDURE — 99207 ZZC NO CHARGE LOS: CPT | Performed by: AUDIOLOGIST

## 2018-10-01 PROCEDURE — V5299 HEARING SERVICE: HCPCS | Performed by: AUDIOLOGIST

## 2018-10-01 NOTE — MR AVS SNAPSHOT
After Visit Summary   10/1/2018    Abhijeet Mccauley    MRN: [id number]           Patient Information     Date Of Birth          1960        Visit Information        Provider Department      10/1/2018 8:30 AM Ai Min AuD [facility]        Today's Diagnoses     Sensorineural hearing loss, asymmetrical    -  1       Follow-ups after your visit        Your next 10 appointments already scheduled     Nov 06, 2018  2:00 PM CST   PROCEDURE with Landen Quiñones MD   [facility] ([facility])    [address]  [address]  [facility] [address] [phone]              Who to contact     If you have questions or need follow up information about today's clinic visit or your schedule please contact [facility] directly at [phone].  Normal or non-critical lab and imaging results will be communicated to you by MyChart, letter or phone within 4 business days after the clinic has received the results. If you do not hear from us within 7 days, please contact the clinic through MyChart or phone. If you have a critical or abnormal lab result, we will notify you by phone as soon as possible.  Submit refill requests through [employer] or call your pharmacy and they will forward the refill request to us. Please allow 3 business days for your refill to be completed.          Additional Information About Your Visit        Care EveryWhere ID     This is your Care EveryWhere ID. This could be used by other organizations to access your [place] medical records  [id number]         Blood Pressure from Last 3 Encounters:   08/07/18 145/73   08/02/18 123/69   07/19/18 143/64    Weight from Last 3 Encounters:   08/02/18 190 lb (86.2 kg)   07/19/18 183 lb 11.2 oz (83.3 kg)   06/12/18 184 lb (83.5 kg)              We Performed the Following     HEARING AID CHECK/NO CHARGE        Primary Care Provider Office Phone # Fab  #    Parrish Funk [phone]       [address] [facility] [address]        Equal Access to Services     JORGE COOL : Hadii aad ku hadmitchelliborio Huddleston, armidamilo martínezmarco aha, randy elizabethmarge fournier, song manjeetin hayaan eliandelroy messina luciana ureña. So [facility] [phone].    ATENCIÓN: Si habla español, tiene a uriostegui disposición servicios gratuitos de asistencia lingüística. Llame al [phone].    We comply with applicable federal civil rights laws and [place] laws. We do not discriminate on the basis of race, color, national origin, age, disability, sex, sexual orientation, or gender identity.            Thank you!     Thank you for choosing [facility]  for your care. Our goal is always to provide you with excellent care. Hearing back from our patients is one way we can continue to improve our services. Please take a few minutes to complete the written survey that you may receive in the mail after your visit with us. Thank you!             Your Updated Medication List - Protect others around you: Learn how to safely use, store and throw away your medicines at www.disposemymeds.org.          This list is accurate as of 10/1/18  1:47 PM.  Always use your most recent med list.                   Brand Name Dispense Instructions for use Diagnosis    acetaminophen 500 MG tablet    TYLENOL     Take 1-2 tablets by mouth every 6 hours as needed.        albuterol 108 (90 Base) MCG/ACT inhaler    PROAIR HFA/PROVENTIL HFA/VENTOLIN HFA    1 Inhaler    Inhale 2 puffs into the lungs every 4 hours as needed    Mild intermittent asthma without complication       baclofen 10 MG tablet    LIORESAL    90 tablet    Take 1/2 to one tablet up to three times a day    Muscle spasm       desonide 0.05 % lotion    DESOWEN    118 mL    Apply topically as needed    Rosacea       furosemide 20 MG tablet    LASIX    30 tablet    Take 1 tablet (20 mg) by mouth every morning    Osteoarthritis of spine without  myelopathy or radiculopathy, sacral and sacrococcygeal region       ibuprofen 400 MG tablet    ADVIL/MOTRIN    30 tablet    Take 1 tablet (400 mg) by mouth every 6 hours as needed for moderate pain    Osteoarthritis of spine without myelopathy or radiculopathy, sacral and sacrococcygeal region       loratadine 10 MG tablet    CLARITIN    30 tablet    Take 1 tablet by mouth daily as needed for allergies.    Allergic rhinitis       Melatonin ER 5 MG Tbcr     30 tablet    Take 1 tablet by mouth At Bedtime    Gastroesophageal reflux disease without esophagitis       mometasone-formoterol 200-5 MCG/ACT oral inhaler    DULERA     Inhale 2 puffs into the lungs 2 times daily        order for DME     1 Device    Equipment being ordered: shower chair    Acute myeloid leukemia in remission (H)       PANTOPRAZOLE SODIUM PO      Take 40 mg by mouth every morning (before breakfast)        potassium chloride SA 10 MEQ CR tablet    K-DUR/KLOR-CON M    90 tablet    Take 1 tablet (10 mEq) by mouth daily    Gastroesophageal reflux disease without esophagitis       propranolol 20 MG tablet    INDERAL    90 tablet    Take 1 tablet (20 mg) by mouth 2 times daily    Gastroesophageal reflux disease without esophagitis       SODIUM BICARBONATE PO      Take 650 mg by mouth daily as needed        tiotropium 18 MCG capsule    SPIRIVA     Inhale 1 capsule into the lungs daily Using PRN        traZODone 50 MG tablet    DESYREL    90 tablet    Take 1 tablet (50 mg) by mouth At Bedtime    Osteoarthritis of spine without myelopathy or radiculopathy, sacral and sacrococcygeal region

## 2018-10-01 NOTE — PROGRESS NOTES
Abhijeet Mccauley 57 year old male drop off his left 2014 [employer] [id number] hearing aid for repair. The hearing aid was purchased at another facility. The hearing aid warranty  2016.    Patient reports the hearing aid is intermittent. Verified hearing aid functionality.     Sent hearing aid in for 12 month warranty repair. Patient will be contacted when the hearing aid is back from repair.     See chart in the hearing aid room.     Ai KUNZ, #[id number]

## 2018-10-01 NOTE — TELEPHONE ENCOUNTER
Called pt and LM asking him to call the main clinic number so that we may discuss the forms that he provided that we not complete.     If patient calls the , please obtain email address from patient.     CARMELINA Hong, RN-BC  Patient Care Supervisor/Care Coordinator  [facility] [place]

## 2018-10-04 ENCOUNTER — ALLIED HEALTH/NURSE VISIT (OUTPATIENT)
Dept: AUDIOLOGY | Facility: CLINIC | Age: 58
End: 2018-10-04
Payer: COMMERCIAL

## 2018-10-04 DIAGNOSIS — H90.3 SENSORINEURAL HEARING LOSS, ASYMMETRICAL: Primary | ICD-10-CM

## 2018-10-04 PROCEDURE — V5257 HEARING AID, DIGIT, MON, BTE: HCPCS | Mod: RB | Performed by: AUDIOLOGIST

## 2018-10-04 PROCEDURE — 99207 ZZC NO CHARGE LOS: CPT | Performed by: AUDIOLOGIST

## 2018-10-04 NOTE — PROGRESS NOTES
Abhijeet Mccauley 57 year old male comes in to  his repaired left hearing aid.     Model: Jeannine Shipley M67-773X   Serial number: [id number].    Warranty expiration date is updated to : 10/3/2019    Verified hearing aid functionality. Hearing aid repair was discussed with patient.     Patient will  repaired hearing aid at the specialty clinic .    Ai KUNZ, #[id number]

## 2018-10-04 NOTE — MR AVS SNAPSHOT
After Visit Summary   10/4/2018    Abhijeet Mccauley    MRN: [id number]           Patient Information     Date Of Birth          1960        Visit Information        Provider Department      10/4/2018 2:30 PM Ai Min AuD [facility]        Today's Diagnoses     Sensorineural hearing loss, asymmetrical    -  1       Follow-ups after your visit        Your next 10 appointments already scheduled     Nov 06, 2018  2:00 PM CST   PROCEDURE with Landen Quiñones MD   [facility] ([facility] [place])    [address]  [address]  [facility] [address] [phone]              Who to contact     If you have questions or need follow up information about today's clinic visit or your schedule please contact [facility] directly at [phone].  Normal or non-critical lab and imaging results will be communicated to you by MyChart, letter or phone within 4 business days after the clinic has received the results. If you do not hear from us within 7 days, please contact the clinic through MyChart or phone. If you have a critical or abnormal lab result, we will notify you by phone as soon as possible.  Submit refill requests through [facility] or call your pharmacy and they will forward the refill request to us. Please allow 3 business days for your refill to be completed.          Additional Information About Your Visit        Care EveryWhere ID     This is your Care EveryWhere ID. This could be used by other organizations to access your [place] medical records  [id number]         Blood Pressure from Last 3 Encounters:   08/07/18 145/73   08/02/18 123/69   07/19/18 143/64    Weight from Last 3 Encounters:   08/02/18 190 lb (86.2 kg)   07/19/18 183 lb 11.2 oz (83.3 kg)   06/12/18 184 lb (83.5 kg)              We Performed the Following     HEARING AID BTE DIGITAL, MONAURAL        Primary Care Provider Office Phone  # Fax #    Parrish Funk [phone] [address] [facility] [address]        Equal Access to Services     JORGE COOL : Hadii aad ku hadmitchelliborio [place], waseanda luqadaha, qayita kaalmada ir[place], song manjeetin hayaamichael plummerdelroy messina luciana ureña. So [facility] [phone].    ATENCIÓN: Si habla español, tiene a uriostegui disposición servicios gratuitos de asistencia lingüística. Llame al [phone].    We comply with applicable federal civil rights laws and [place] laws. We do not discriminate on the basis of race, color, national origin, age, disability, sex, sexual orientation, or gender identity.            Thank you!     Thank you for choosing [facility]  for your care. Our goal is always to provide you with excellent care. Hearing back from our patients is one way we can continue to improve our services. Please take a few minutes to complete the written survey that you may receive in the mail after your visit with us. Thank you!             Your Updated Medication List - Protect others around you: Learn how to safely use, store and throw away your medicines at www.disposemymeds.org.          This list is accurate as of 10/4/18  3:34 PM.  Always use your most recent med list.                   Brand Name Dispense Instructions for use Diagnosis    acetaminophen 500 MG tablet    TYLENOL     Take 1-2 tablets by mouth every 6 hours as needed.        albuterol 108 (90 Base) MCG/ACT inhaler    PROAIR HFA/PROVENTIL HFA/VENTOLIN HFA    1 Inhaler    Inhale 2 puffs into the lungs every 4 hours as needed    Mild intermittent asthma without complication       baclofen 10 MG tablet    LIORESAL    90 tablet    Take 1/2 to one tablet up to three times a day    Muscle spasm       desonide 0.05 % lotion    DESOWEN    118 mL    Apply topically as needed    Rosacea       furosemide 20 MG tablet    LASIX    30 tablet    Take 1 tablet (20 mg) by mouth every morning    Osteoarthritis of spine without  myelopathy or radiculopathy, sacral and sacrococcygeal region       ibuprofen 400 MG tablet    ADVIL/MOTRIN    30 tablet    Take 1 tablet (400 mg) by mouth every 6 hours as needed for moderate pain    Osteoarthritis of spine without myelopathy or radiculopathy, sacral and sacrococcygeal region       loratadine 10 MG tablet    CLARITIN    30 tablet    Take 1 tablet by mouth daily as needed for allergies.    Allergic rhinitis       Melatonin ER 5 MG Tbcr     30 tablet    Take 1 tablet by mouth At Bedtime    Gastroesophageal reflux disease without esophagitis       mometasone-formoterol 200-5 MCG/ACT oral inhaler    DULERA     Inhale 2 puffs into the lungs 2 times daily        order for DME     1 Device    Equipment being ordered: shower chair    Acute myeloid leukemia in remission (H)       PANTOPRAZOLE SODIUM PO      Take 40 mg by mouth every morning (before breakfast)        potassium chloride SA 10 MEQ CR tablet    K-DUR/KLOR-CON M    90 tablet    Take 1 tablet (10 mEq) by mouth daily    Gastroesophageal reflux disease without esophagitis       propranolol 20 MG tablet    INDERAL    90 tablet    Take 1 tablet (20 mg) by mouth 2 times daily    Gastroesophageal reflux disease without esophagitis       SODIUM BICARBONATE PO      Take 650 mg by mouth daily as needed        tiotropium 18 MCG capsule    SPIRIVA     Inhale 1 capsule into the lungs daily Using PRN        traZODone 50 MG tablet    DESYREL    90 tablet    Take 1 tablet (50 mg) by mouth At Bedtime    Osteoarthritis of spine without myelopathy or radiculopathy, sacral and sacrococcygeal region

## 2018-10-09 DIAGNOSIS — M25.562 LEFT KNEE PAIN: Primary | ICD-10-CM

## 2018-10-10 ENCOUNTER — HOSPITAL ENCOUNTER (INPATIENT)
Facility: CLINIC | Age: 58
Setting detail: SURGERY ADMIT
End: 2018-10-10
Attending: ORTHOPAEDIC SURGERY | Admitting: ORTHOPAEDIC SURGERY
Payer: COMMERCIAL

## 2018-10-10 DIAGNOSIS — M25.562 LEFT KNEE PAIN: ICD-10-CM

## 2018-10-10 LAB
BASOPHILS # BLD AUTO: 0.1 10E9/L (ref 0–0.2)
BASOPHILS NFR BLD AUTO: 0.6 %
CRP SERPL-MCNC: 25.1 MG/L (ref 0–8)
DIFFERENTIAL METHOD BLD: ABNORMAL
EOSINOPHIL # BLD AUTO: 0.2 10E9/L (ref 0–0.7)
EOSINOPHIL NFR BLD AUTO: 1.9 %
ERYTHROCYTE [DISTWIDTH] IN BLOOD BY AUTOMATED COUNT: 17.2 % (ref 10–15)
ERYTHROCYTE [SEDIMENTATION RATE] IN BLOOD BY WESTERGREN METHOD: 55 MM/H (ref 0–20)
HCT VFR BLD AUTO: 31.6 % (ref 40–53)
HGB BLD-MCNC: 10 G/DL (ref 13.3–17.7)
LYMPHOCYTES # BLD AUTO: 1.3 10E9/L (ref 0.8–5.3)
LYMPHOCYTES NFR BLD AUTO: 11.9 %
MCH RBC QN AUTO: 31.4 PG (ref 26.5–33)
MCHC RBC AUTO-ENTMCNC: 31.6 G/DL (ref 31.5–36.5)
MCV RBC AUTO: 99 FL (ref 78–100)
MONOCYTES # BLD AUTO: 1.4 10E9/L (ref 0–1.3)
MONOCYTES NFR BLD AUTO: 12.7 %
NEUTROPHILS # BLD AUTO: 7.9 10E9/L (ref 1.6–8.3)
NEUTROPHILS NFR BLD AUTO: 72.9 %
PLATELET # BLD AUTO: 127 10E9/L (ref 150–450)
RBC # BLD AUTO: 3.18 10E12/L (ref 4.4–5.9)
WBC # BLD AUTO: 10.9 10E9/L (ref 4–11)

## 2018-10-10 PROCEDURE — 85652 RBC SED RATE AUTOMATED: CPT | Performed by: ORTHOPAEDIC SURGERY

## 2018-10-10 PROCEDURE — 86140 C-REACTIVE PROTEIN: CPT | Performed by: ORTHOPAEDIC SURGERY

## 2018-10-10 PROCEDURE — 85025 COMPLETE CBC W/AUTO DIFF WBC: CPT | Performed by: ORTHOPAEDIC SURGERY

## 2018-10-10 PROCEDURE — 36415 COLL VENOUS BLD VENIPUNCTURE: CPT | Performed by: ORTHOPAEDIC SURGERY

## 2018-10-11 ENCOUNTER — ALLIED HEALTH/NURSE VISIT (OUTPATIENT)
Dept: AUDIOLOGY | Facility: CLINIC | Age: 58
End: 2018-10-11
Payer: COMMERCIAL

## 2018-10-11 ENCOUNTER — TELEPHONE (OUTPATIENT)
Dept: AUDIOLOGY | Facility: CLINIC | Age: 58
End: 2018-10-11

## 2018-10-11 DIAGNOSIS — H90.3 SENSORINEURAL HEARING LOSS, ASYMMETRICAL: Primary | ICD-10-CM

## 2018-10-11 PROCEDURE — V5266 BATTERY FOR HEARING DEVICE: HCPCS | Performed by: AUDIOLOGIST

## 2018-10-11 PROCEDURE — 99207 ZZC NO CHARGE LOS: CPT | Performed by: AUDIOLOGIST

## 2018-10-11 NOTE — TELEPHONE ENCOUNTER
Reason for Call:  Other     Detailed comments: Pt is requesting new hearing aid batteries and cleaning supplies - He is requesting to pick these up today at 3:00 - Please advise    Phone Number Patient can be reached at: [phone]    Best Time: Any    Can we leave a detailed message on this number? NO    Call taken on 10/11/2018 at 10:41 AM by Denise Behrendt

## 2018-10-11 NOTE — PROGRESS NOTES
Abhijeet Mccauley,57 year old male requests hearing aid batteries and supplies. Patient is currently wearing 2014 [employer]eo [id number] hearing aids that were fit at another facility.     Verified date of last battery dispensing and battery size needed.     Patient will  36 size 312 hearing aid batteries and 2 packages of wax guards at the specialty clinic .     See chart in the hearing aid room.     [facility], #[id number]

## 2018-10-11 NOTE — MR AVS SNAPSHOT
After Visit Summary   10/11/2018    Abhijeet Mccauley    MRN: [id number]           Patient Information     Date Of Birth          1960        Visit Information        Provider Department      10/11/2018 1:00 PM Ai Min AuD [facility]        Today's Diagnoses     Sensorineural hearing loss, asymmetrical    -  1       Follow-ups after your visit        Your next 10 appointments already scheduled     Oct 11, 2018  1:00 PM CDT   Hearing Aid [facility] with Sheila Romano   [facility]    [address] [facility] [address]   [phone]            Oct 31, 2018   Procedure with Morgan Pan MD   [facility] (--)    [address] [facility] [address] [phone]           The medical center is located at [address] (between [address] in [place], [place] north of [place]).            Nov 06, 2018  2:00 PM CST   PROCEDURE with Landen Quiñones MD   [facility] ([facility] [place])    [address]  [address]  [facility] [id number] [phone]              Who to contact     If you have questions or need follow up information about today's clinic visit or your schedule please contact [facility] directly at [phone].  Normal or non-critical lab and imaging results will be communicated to you by MyChart, letter or phone within 4 business days after the clinic has received the results. If you do not hear from us within 7 days, please contact the clinic through MyChart or phone. If you have a critical or abnormal lab result, we will notify you by phone as soon as possible.  Submit refill requests through [employer] or call your pharmacy and they will forward the refill request to us. Please allow 3 business days for your refill to be completed.          Additional Information About Your Visit         Care EveryWhere ID     This is your Care EveryWhere ID. This could be used by other organizations to access your [place] medical records  [id number]         Blood Pressure from Last 3 Encounters:   08/07/18 145/73   08/02/18 123/69   07/19/18 143/64    Weight from Last 3 Encounters:   08/02/18 190 lb (86.2 kg)   07/19/18 183 lb 11.2 oz (83.3 kg)   06/12/18 184 lb (83.5 kg)              We Performed the Following     HC HEARING DEVICE BATTERY        Primary Care Provider Office Phone # Fax #    Parrish Kristopher Funk [phone] [address] [facility] [address]        Equal Access to Services     JORGE COOL : Hadii sameera pickeringo Soemily, waaxda luqadaha, qaybta kaalmada adeegyada, song ureña. So [facility] [phone].    ATENCIÓN: Si habla español, tiene a uriostegui disposición servicios gratuitos de asistencia lingüística. [facility] [phone].    We comply with applicable federal civil rights laws and [place] laws. We do not discriminate on the basis of race, color, national origin, age, disability, sex, sexual orientation, or gender identity.            Thank you!     Thank you for choosing [facility]  for your care. Our goal is always to provide you with excellent care. Hearing back from our patients is one way we can continue to improve our services. Please take a few minutes to complete the written survey that you may receive in the mail after your visit with us. Thank you!             Your Updated Medication List - Protect others around you: Learn how to safely use, store and throw away your medicines at www.disposemymeds.org.          This list is accurate as of 10/11/18 11:01 AM.  Always use your most recent med list.                   Brand Name Dispense Instructions for use Diagnosis    acetaminophen 500 MG tablet    TYLENOL     Take 1-2 tablets by mouth every 6 hours as needed.        albuterol 108 (90 Base) MCG/ACT inhaler     PROAIR HFA/PROVENTIL HFA/VENTOLIN HFA    1 Inhaler    Inhale 2 puffs into the lungs every 4 hours as needed    Mild intermittent asthma without complication       baclofen 10 MG tablet    LIORESAL    90 tablet    Take 1/2 to one tablet up to three times a day    Muscle spasm       desonide 0.05 % lotion    DESOWEN    118 mL    Apply topically as needed    Rosacea       furosemide 20 MG tablet    LASIX    30 tablet    Take 1 tablet (20 mg) by mouth every morning    Osteoarthritis of spine without myelopathy or radiculopathy, sacral and sacrococcygeal region       ibuprofen 400 MG tablet    ADVIL/MOTRIN    30 tablet    Take 1 tablet (400 mg) by mouth every 6 hours as needed for moderate pain    Osteoarthritis of spine without myelopathy or radiculopathy, sacral and sacrococcygeal region       loratadine 10 MG tablet    CLARITIN    30 tablet    Take 1 tablet by mouth daily as needed for allergies.    Allergic rhinitis       Melatonin ER 5 MG Tbcr     30 tablet    Take 1 tablet by mouth At Bedtime    Gastroesophageal reflux disease without esophagitis       mometasone-formoterol 200-5 MCG/ACT oral inhaler    DULERA     Inhale 2 puffs into the lungs 2 times daily        order for DME     1 Device    Equipment being ordered: shower chair    Acute myeloid leukemia in remission (H)       PANTOPRAZOLE SODIUM PO      Take 40 mg by mouth every morning (before breakfast)        potassium chloride SA 10 MEQ CR tablet    K-DUR/KLOR-CON M    90 tablet    Take 1 tablet (10 mEq) by mouth daily    Gastroesophageal reflux disease without esophagitis       propranolol 20 MG tablet    INDERAL    90 tablet    Take 1 tablet (20 mg) by mouth 2 times daily    Gastroesophageal reflux disease without esophagitis       SODIUM BICARBONATE PO      Take 650 mg by mouth daily as needed        tiotropium 18 MCG capsule    SPIRIVA     Inhale 1 capsule into the lungs daily Using PRN        traZODone 50 MG tablet    DESYREL    90 tablet    Take 1  tablet (50 mg) by mouth At Bedtime    Osteoarthritis of spine without myelopathy or radiculopathy, sacral and sacrococcygeal region

## 2018-11-06 ENCOUNTER — TELEPHONE (OUTPATIENT)
Dept: PALLIATIVE MEDICINE | Facility: CLINIC | Age: 58
End: 2018-11-06

## 2018-11-06 NOTE — TELEPHONE ENCOUNTER
Writer is unsure if medical Marijuana certification has been completed.      Will forward to Dr. Mary Anne Quiñones to review.  If not will ask Pt to discuss with Dr. Mary Anne Quiñones on 11/8/18.    Dillon Mitchell, RN  Care Coordinator   [facility] [place]

## 2018-11-06 NOTE — TELEPHONE ENCOUNTER
Received call from patient to reschedule his appointment for today as he did not get a reminder call. Rescheduled to 11/8 and updated preferred phone number and communication preferences. During call patient was wondering about his medical marijuana as he states he never heard anything back in regards to it after dropping off his forms. Phone #[phone]      Tati Ruiz    [facility]

## 2018-11-08 ENCOUNTER — OFFICE VISIT (OUTPATIENT)
Dept: PALLIATIVE MEDICINE | Facility: CLINIC | Age: 58
End: 2018-11-08
Payer: COMMERCIAL

## 2018-11-08 VITALS — DIASTOLIC BLOOD PRESSURE: 74 MMHG | SYSTOLIC BLOOD PRESSURE: 137 MMHG | OXYGEN SATURATION: 97 % | HEART RATE: 81 BPM

## 2018-11-08 DIAGNOSIS — M25.551 CHRONIC RIGHT HIP PAIN: ICD-10-CM

## 2018-11-08 DIAGNOSIS — M70.61 TROCHANTERIC BURSITIS OF RIGHT HIP: Primary | ICD-10-CM

## 2018-11-08 DIAGNOSIS — G89.29 CHRONIC RIGHT HIP PAIN: ICD-10-CM

## 2018-11-08 DIAGNOSIS — M62.838 MUSCLE SPASM: ICD-10-CM

## 2018-11-08 DIAGNOSIS — M79.18 MYOFASCIAL PAIN: ICD-10-CM

## 2018-11-08 PROCEDURE — 20610 DRAIN/INJ JOINT/BURSA W/O US: CPT | Mod: RT | Performed by: ANESTHESIOLOGY

## 2018-11-08 PROCEDURE — 20553 NJX 1/MLT TRIGGER POINTS 3/>: CPT | Mod: 59 | Performed by: ANESTHESIOLOGY

## 2018-11-08 ASSESSMENT — PAIN SCALES - GENERAL: PAINLEVEL: EXTREME PAIN (9)

## 2018-11-08 NOTE — PATIENT INSTRUCTIONS
[facility]   Post Procedure Instructions    Today you had:  Cervical  trigger point injections and right hip  bursa injection      Medications used:  Hip:    bupivicaine   kenolog     Cervical trigger point injection:  Lidocaine and bupivicaine          The long-acting steroid, kenalog, can take up to days to take full effect.     Monitor the injection sites for signs and symptoms of infection-fever, chills, redness, swelling, warmth, or drainage to areas.    You may have soreness at injection sites for up to 24 hours.    You may apply ice to the painful areas to help minimize the discomfort of the needle pokes.    Do not apply heat to sites for at least 12 hours.    You may use anti-inflammatory medications or Tylenol for pain control if necessary    Pain Clinic phone number during work hours Monday-Friday:  [phone]    After hours provider line: [phone]    F/u with Dr. Mary Anne Quiñones in 3 months

## 2018-11-08 NOTE — TELEPHONE ENCOUNTER
At the time the forms were dropped off, he did not have an email address on the forms.  He was contacted on 9/13/18 and a message was left for him to provide an email address.  I do not see an encounter after that.  He does not have an email address listed in his demographics either.    The email address is the main contact for medical cannabis so is necessary for certification.    Landen Quiñones MD  [facility]

## 2018-11-08 NOTE — TELEPHONE ENCOUNTER
Writer called pt.  Pt stated that his sister put the email address on the form.  Writer reviewed with pt that we don't have this information and it's important to have this information.    Pt stated that he doesn't know it and will have to get it from his sister.    Dillon Mitchell, RN  Care Coordinator   [facility] [place]

## 2018-11-08 NOTE — PROGRESS NOTES
Pre procedure Diagnosis: myofascial pain, chronic right hip pain, right hip bursitis  Post procedure Diagnosis: Same  Procedure performed: trigger point injections and right hip bursa injection  Anesthesia: none  Complications: none  Operators: Landen Quiñones MD     Indications:   Abhijeet Mccauley is a 57 year old male with a history of chronic neck pain and spasm as well as chronic right hip pain.  Exam shows myofascial pain of the muscle groups listed below and tenderness to palpation at the right hip greater trochanter and they have tried conservative treatment including physical therapy, medications, and interventional procedures.    Options/alternatives, benefits and risks were discussed with the patient including bleeding, infection, tissue trauma and pnuemothorax.  Questions were answered to his satisfaction and he agrees to proceed. Voluntary informed consent was obtained and signed.     Vitals were reviewed: Yes  /74  Pulse 81  SpO2 97%  Allergies were reviewed:  Yes   Medications were reviewed:  Yes   Pre-procedure pain score: 9/10 in the neck and 8/10 right hip    Procedure:  After getting informed consent, a Pause for the Cause was performed.    Trigger points were identified by the patient, and marked when appropriate.  The area was prepped with Chloroprep.    Using clean technique, injections were completed using a 30 G, 0.5  inch needle.  After negative aspiration, injections were completed.  A total of 8 locations were injected.  When possible, tissue was retracted from the chest wall to avoid lung injury.    Muscle groups injected:  Cervical paraspinal muscles  Thoracic paraspinal muscles  Trapezius muscles    Injection solution contained:  5 ml of 1% lidocaine and 5 ml of 0.5% bupivacaine (total injectate volume 10 ml).    Then, the patient was positioned in a left lateral decubitus position.  After identifying the point of maximal tenderness at the right greater trochanter, the area  was prepped in the usual sterile fashion. Then, a 25 gauge 3.5 inch spinal needle was advanced to contact bone at the right greater trochanter with positive pain reproduction and withdrawn 1-2 mm. Aspiration was negative. Then, 5 ml of a combination of Kenalog 20 mg and Bupivicaine 0.5% 4.5 ml was injected into the bursa and the needle was withdrawn. The injection site was cleaned and a bandaid was placed.    Hemostasis was achieved, the area was cleaned, and bandaids were placed when appropriate.  The patient tolerated the procedure well.  Breath sounds were normal.     Post-procedure pain score: 6/10 in the neck and 0/10 at the hip  Follow-up includes:   -repeat prn    Landen Quiñones MD  [facility]

## 2018-11-08 NOTE — MR AVS SNAPSHOT
After Visit Summary   11/8/2018    Abhijeet Mccauley    MRN: [id number]           Patient Information     Date Of Birth          1960        Visit Information        Provider Department      11/8/2018 1:00 PM Landen Calloway MD [facility] [place]        Care Instructions    [facility]   Post Procedure Instructions    Today you had:  Cervical  trigger point injections and right hip  bursa injection      Medications used:  Hip:    bupivicaine   kenolog     Cervical trigger point injection:  Lidocaine and bupivicaine          The long-acting steroid, kenalog, can take up to days to take full effect.     Monitor the injection sites for signs and symptoms of infection-fever, chills, redness, swelling, warmth, or drainage to areas.    You may have soreness at injection sites for up to 24 hours.    You may apply ice to the painful areas to help minimize the discomfort of the needle pokes.    Do not apply heat to sites for at least 12 hours.    You may use anti-inflammatory medications or Tylenol for pain control if necessary    Pain Clinic phone number during work hours Monday-Friday:  [phone]    After hours provider line: [phone]    F/u with Dr. Mary Anne Quiñones in 3 months               Follow-ups after your visit        Your next 10 appointments already scheduled     Nov 12, 2018  3:30 PM CST   [facility]    [address] [place] [address]   [phone]            Dec 12, 2018   Procedure with Morgan Pan MD   [facility] (--)    [address] [address]   [phone]           The medical center is located at [address]. (between [address] in [place], [place] north of [place]).              Who to contact     If you have questions or need follow up information about today's clinic visit  or your schedule please contact [facility] directly at [phone].  Normal or non-critical lab and imaging results will be communicated to you by MyChart, letter or phone within 4 business days after the clinic has received the results. If you do not hear from us within 7 days, please contact the clinic through MyChart or phone. If you have a critical or abnormal lab result, we will notify you by phone as soon as possible.  Submit refill requests through [employer] or call your pharmacy and they will forward the refill request to us. Please allow 3 business days for your refill to be completed.          Additional Information About Your Visit        Care EveryWhere ID     This is your Care EveryWhere ID. This could be used by other organizations to access your [place] medical records  [id number]        Your Vitals Were     Pulse Pulse Oximetry                81 97%           Blood Pressure from Last 3 Encounters:   11/08/18 137/74   08/07/18 145/73   08/02/18 123/69    Weight from Last 3 Encounters:   08/02/18 86.2 kg (190 lb)   07/19/18 83.3 kg (183 lb 11.2 oz)   06/12/18 83.5 kg (184 lb)              Today, you had the following     No orders found for display       Primary Care Provider Office Phone # Fax #    Parrish Funk [phone] [address] [facility] [address]        Equal Access to Services     JORGE COOL AH: Hadmeng hamilton Soemily, waaxda luqadaha, qaybta kaalmada irlanda, song ureña. So [facility] [phone].    ATENCIÓN: Si habla español, tiene a uriostegui disposición servicios gratuitos de asistencia lingüística. Claudy waldron [phone].    We comply with applicable federal civil rights laws and [place] laws. We do not discriminate on the basis of race, color, national origin, age, disability, sex, sexual orientation, or gender identity.            Thank you!     Thank you for choosing [facility]  [facility]  for your care. Our goal is always to provide you with excellent care. Hearing back from our patients is one way we can continue to improve our services. Please take a few minutes to complete the written survey that you may receive in the mail after your visit with us. Thank you!             Your Updated Medication List - Protect others around you: Learn how to safely use, store and throw away your medicines at www.disposemymeds.org.          This list is accurate as of 11/8/18  1:25 PM.  Always use your most recent med list.                   Brand Name Dispense Instructions for use Diagnosis    acetaminophen 500 MG tablet    TYLENOL     Take 1-2 tablets by mouth every 6 hours as needed.        albuterol 108 (90 Base) MCG/ACT inhaler    PROAIR HFA/PROVENTIL HFA/VENTOLIN HFA    1 Inhaler    Inhale 2 puffs into the lungs every 4 hours as needed    Mild intermittent asthma without complication       baclofen 10 MG tablet    LIORESAL    90 tablet    Take 1/2 to one tablet up to three times a day    Muscle spasm       desonide 0.05 % lotion    DESOWEN    118 mL    Apply topically as needed    Rosacea       furosemide 20 MG tablet    LASIX    30 tablet    Take 1 tablet (20 mg) by mouth every morning    Osteoarthritis of spine without myelopathy or radiculopathy, sacral and sacrococcygeal region       loratadine 10 MG tablet    CLARITIN    30 tablet    Take 1 tablet by mouth daily as needed for allergies.    Allergic rhinitis       Melatonin ER 5 MG Tbcr     30 tablet    Take 1 tablet by mouth At Bedtime    Gastroesophageal reflux disease without esophagitis       mometasone-formoterol 200-5 MCG/ACT oral inhaler    DULERA     Inhale 2 puffs into the lungs 2 times daily        order for DME     1 Device    Equipment being ordered: shower chair    Acute myeloid leukemia in remission (H)       PANTOPRAZOLE SODIUM PO      Take 40 mg by mouth every morning (before breakfast)        potassium chloride  SA 10 MEQ CR tablet    K-DUR/KLOR-CON M    90 tablet    Take 1 tablet (10 mEq) by mouth daily    Gastroesophageal reflux disease without esophagitis       propranolol 20 MG tablet    INDERAL    90 tablet    Take 1 tablet (20 mg) by mouth 2 times daily    Gastroesophageal reflux disease without esophagitis       SODIUM BICARBONATE PO      Take 650 mg by mouth daily as needed        tiotropium 18 MCG capsule    SPIRIVA     Inhale 1 capsule into the lungs daily Using PRN        traZODone 50 MG tablet    DESYREL    90 tablet    Take 1 tablet (50 mg) by mouth At Bedtime    Osteoarthritis of spine without myelopathy or radiculopathy, sacral and sacrococcygeal region

## 2018-11-12 ENCOUNTER — OFFICE VISIT (OUTPATIENT)
Dept: AUDIOLOGY | Facility: CLINIC | Age: 58
End: 2018-11-12
Payer: COMMERCIAL

## 2018-11-12 DIAGNOSIS — H90.3 SENSORINEURAL HEARING LOSS, ASYMMETRICAL: Primary | ICD-10-CM

## 2018-11-12 PROCEDURE — 99207 ZZC NO CHARGE LOS: CPT | Performed by: AUDIOLOGIST

## 2018-11-12 PROCEDURE — V5020 CONFORMITY EVALUATION: HCPCS | Performed by: AUDIOLOGIST

## 2018-11-12 PROCEDURE — 92593 HC HEARING AID CHECK, BINAURAL: CPT | Performed by: AUDIOLOGIST

## 2018-11-12 NOTE — MR AVS SNAPSHOT
After Visit Summary   11/12/2018    Abhijeet Mccauley    MRN: [id number]           Patient Information     Date Of Birth          1960        Visit Information        Provider Department      11/12/2018 3:30 PM [facility] Min [facility]        Today's Diagnoses     Sensorineural hearing loss, asymmetrical    -  1       Follow-ups after your visit        Your next 10 appointments already scheduled     Dec 12, 2018   Procedure with Morgan Pan MD   [facility] (--)    [address] [facility] [address] [phone]           The medical center is located at [address] [facility] [address]. (between [address] in [place], [place] north of [place]).            Feb 13, 2019  2:30 PM CST   Return Visit with Landen Quiñones MD   [facility] [place] ([facility] [place])    [address]  [address]  [facility] [address] [phone]              Who to contact     If you have questions or need follow up information about today's clinic visit or your schedule please contact [facility] directly at [phone].  Normal or non-critical lab and imaging results will be communicated to you by MyChart, letter or phone within 4 business days after the clinic has received the results. If you do not hear from us within 7 days, please contact the clinic through MyChart or phone. If you have a critical or abnormal lab result, we will notify you by phone as soon as possible.  Submit refill requests through [employer] or call your pharmacy and they will forward the refill request to us. Please allow 3 business days for your refill to be completed.          Additional Information About Your Visit        Care EveryWhere ID     This is your Care EveryWhere ID. This could be used by other organizations to access your [facility] medical records  [id number]         Blood Pressure from Last 3  Encounters:   11/08/18 137/74   08/07/18 145/73   08/02/18 123/69    Weight from Last 3 Encounters:   08/02/18 190 lb (86.2 kg)   07/19/18 183 lb 11.2 oz (83.3 kg)   06/12/18 184 lb (83.5 kg)              We Performed the Following     HEARING AID CHECK, BINAURAL     HEARING AID CONFORMITY EVALUATION        Primary Care Provider Office Phone # Fax #    Parrish Funk [phone] [address] [facility] [address]        Equal Access to Services     [facility] HospitalSRIKANTH : Hadii aad ku hadasho Soomaali, waaxda luqadaha, qaybta kaalmada adedelroyyada, song chowdhury . So [facility] [phone].    ATENCIÓN: Si habla español, tiene a uriostegui disposición servicios gratuitos de asistencia lingüística. [facility] [phone].    We comply with applicable federal civil rights laws and [place] laws. We do not discriminate on the basis of race, color, national origin, age, disability, sex, sexual orientation, or gender identity.            Thank you!     Thank you for choosing [facility]  for your care. Our goal is always to provide you with excellent care. Hearing back from our patients is one way we can continue to improve our services. Please take a few minutes to complete the written survey that you may receive in the mail after your visit with us. Thank you!             Your Updated Medication List - Protect others around you: Learn how to safely use, store and throw away your medicines at www.disposemymeds.org.          This list is accurate as of 11/12/18  4:33 PM.  Always use your most recent med list.                   Brand Name Dispense Instructions for use Diagnosis    acetaminophen 500 MG tablet    TYLENOL     Take 1-2 tablets by mouth every 6 hours as needed.        albuterol 108 (90 Base) MCG/ACT inhaler    PROAIR HFA/PROVENTIL HFA/VENTOLIN HFA    1 Inhaler    Inhale 2 puffs into the lungs every 4 hours as needed    Mild intermittent asthma without complication        baclofen 10 MG tablet    LIORESAL    90 tablet    Take 1/2 to one tablet up to three times a day    Muscle spasm       desonide 0.05 % lotion    DESOWEN    118 mL    Apply topically as needed    Rosacea       furosemide 20 MG tablet    LASIX    30 tablet    Take 1 tablet (20 mg) by mouth every morning    Osteoarthritis of spine without myelopathy or radiculopathy, sacral and sacrococcygeal region       loratadine 10 MG tablet    CLARITIN    30 tablet    Take 1 tablet by mouth daily as needed for allergies.    Allergic rhinitis       Melatonin ER 5 MG Tbcr     30 tablet    Take 1 tablet by mouth At Bedtime    Gastroesophageal reflux disease without esophagitis       mometasone-formoterol 200-5 MCG/ACT oral inhaler    DULERA     Inhale 2 puffs into the lungs 2 times daily        order for DME     1 Device    Equipment being ordered: shower chair    Acute myeloid leukemia in remission (H)       PANTOPRAZOLE SODIUM PO      Take 40 mg by mouth every morning (before breakfast)        potassium chloride SA 10 MEQ CR tablet    K-DUR/KLOR-CON M    90 tablet    Take 1 tablet (10 mEq) by mouth daily    Gastroesophageal reflux disease without esophagitis       propranolol 20 MG tablet    INDERAL    90 tablet    Take 1 tablet (20 mg) by mouth 2 times daily    Gastroesophageal reflux disease without esophagitis       SODIUM BICARBONATE PO      Take 650 mg by mouth daily as needed        tiotropium 18 MCG capsule    SPIRIVA     Inhale 1 capsule into the lungs daily Using PRN        traZODone 50 MG tablet    DESYREL    90 tablet    Take 1 tablet (50 mg) by mouth At Bedtime    Osteoarthritis of spine without myelopathy or radiculopathy, sacral and sacrococcygeal region

## 2018-11-12 NOTE — PROGRESS NOTES
[facility]         SUBJECTIVE:  Abhijeet Mccauley, 57 year old male comes in for a hearing aid recheck. He brings in custom ear tip with  wires attached that he states were made for him when he was fit with his hearing aids at another facility. He is currently wearing 2014 [employer]eo [id number] hearing aids.     OBJECTIVE:  Replaced  wires and custom ear tips. Patient reports they feel much better than the domes. Verified hearing aid settings with real ear measurements using NAL-NL2 targets. Reviewed care and cleaning of new custom ear tips.     ASSESSMENT/PLAN:   Return to clinic if any fit problems.     See chart in the hearing aid room.     Ai Min M.A. -[facility], #[id number]

## 2018-11-20 ENCOUNTER — OFFICE VISIT (OUTPATIENT)
Dept: FAMILY MEDICINE | Facility: CLINIC | Age: 58
End: 2018-11-20
Payer: COMMERCIAL

## 2018-11-20 ENCOUNTER — ALLIED HEALTH/NURSE VISIT (OUTPATIENT)
Dept: AUDIOLOGY | Facility: CLINIC | Age: 58
End: 2018-11-20
Payer: COMMERCIAL

## 2018-11-20 VITALS
RESPIRATION RATE: 13 BRPM | BODY MASS INDEX: 31.28 KG/M2 | OXYGEN SATURATION: 98 % | HEART RATE: 81 BPM | TEMPERATURE: 98 F | WEIGHT: 188 LBS | SYSTOLIC BLOOD PRESSURE: 124 MMHG | DIASTOLIC BLOOD PRESSURE: 60 MMHG

## 2018-11-20 DIAGNOSIS — R82.90 NONSPECIFIC FINDING ON EXAMINATION OF URINE: ICD-10-CM

## 2018-11-20 DIAGNOSIS — F17.200 TOBACCO DEPENDENCE SYNDROME: ICD-10-CM

## 2018-11-20 DIAGNOSIS — G89.29 CHRONIC PAIN OF LEFT KNEE: Primary | ICD-10-CM

## 2018-11-20 DIAGNOSIS — K27.9 PEPTIC ULCER DISEASE: ICD-10-CM

## 2018-11-20 DIAGNOSIS — D68.9 COAGULATION DEFECT (H): ICD-10-CM

## 2018-11-20 DIAGNOSIS — H90.3 SENSORINEURAL HEARING LOSS, BILATERAL: Primary | ICD-10-CM

## 2018-11-20 DIAGNOSIS — R30.0 DYSURIA: ICD-10-CM

## 2018-11-20 DIAGNOSIS — D64.9 NORMOCYTIC ANEMIA: ICD-10-CM

## 2018-11-20 DIAGNOSIS — J45.20 MILD INTERMITTENT ASTHMA WITHOUT COMPLICATION: ICD-10-CM

## 2018-11-20 DIAGNOSIS — N18.30 CKD (CHRONIC KIDNEY DISEASE) STAGE 3, GFR 30-59 ML/MIN (H): ICD-10-CM

## 2018-11-20 DIAGNOSIS — R94.31 ABNORMAL ELECTROCARDIOGRAM: ICD-10-CM

## 2018-11-20 DIAGNOSIS — M25.562 CHRONIC PAIN OF LEFT KNEE: Primary | ICD-10-CM

## 2018-11-20 DIAGNOSIS — I10 ESSENTIAL HYPERTENSION: ICD-10-CM

## 2018-11-20 DIAGNOSIS — Z23 NEED FOR PROPHYLACTIC VACCINATION AND INOCULATION AGAINST INFLUENZA: ICD-10-CM

## 2018-11-20 DIAGNOSIS — F10.20 UNCOMPLICATED ALCOHOL DEPENDENCE (H): ICD-10-CM

## 2018-11-20 DIAGNOSIS — Z01.818 PREOP GENERAL PHYSICAL EXAM: ICD-10-CM

## 2018-11-20 DIAGNOSIS — C92.01 ACUTE MYELOID LEUKEMIA IN REMISSION (H): ICD-10-CM

## 2018-11-20 LAB
ALBUMIN SERPL-MCNC: 3.3 G/DL (ref 3.4–5)
ALBUMIN UR-MCNC: NEGATIVE MG/DL
ALP SERPL-CCNC: 290 U/L (ref 40–150)
ALT SERPL W P-5'-P-CCNC: 34 U/L (ref 0–70)
ANION GAP SERPL CALCULATED.3IONS-SCNC: 10 MMOL/L (ref 3–14)
APPEARANCE UR: ABNORMAL
AST SERPL W P-5'-P-CCNC: 51 U/L (ref 0–45)
BACTERIA #/AREA URNS HPF: ABNORMAL /HPF
BILIRUB SERPL-MCNC: 1.3 MG/DL (ref 0.2–1.3)
BILIRUB UR QL STRIP: NEGATIVE
BUN SERPL-MCNC: 28 MG/DL (ref 7–30)
CALCIUM SERPL-MCNC: 8.3 MG/DL (ref 8.5–10.1)
CHLORIDE SERPL-SCNC: 108 MMOL/L (ref 94–109)
CO2 SERPL-SCNC: 24 MMOL/L (ref 20–32)
COLOR UR AUTO: YELLOW
CREAT SERPL-MCNC: 2.11 MG/DL (ref 0.66–1.25)
ERYTHROCYTE [DISTWIDTH] IN BLOOD BY AUTOMATED COUNT: 17.3 % (ref 10–15)
GFR SERPL CREATININE-BSD FRML MDRD: 32 ML/MIN/1.7M2
GLUCOSE SERPL-MCNC: 91 MG/DL (ref 70–99)
GLUCOSE UR STRIP-MCNC: NEGATIVE MG/DL
HCT VFR BLD AUTO: 27.4 % (ref 40–53)
HGB BLD-MCNC: 8.3 G/DL (ref 13.3–17.7)
HGB UR QL STRIP: ABNORMAL
KETONES UR STRIP-MCNC: NEGATIVE MG/DL
LEUKOCYTE ESTERASE UR QL STRIP: ABNORMAL
MCH RBC QN AUTO: 30.1 PG (ref 26.5–33)
MCHC RBC AUTO-ENTMCNC: 30.3 G/DL (ref 31.5–36.5)
MCV RBC AUTO: 99 FL (ref 78–100)
MRSA DNA SPEC QL NAA+PROBE: NEGATIVE
NITRATE UR QL: NEGATIVE
PH UR STRIP: 7 PH (ref 5–7)
PLATELET # BLD AUTO: 194 10E9/L (ref 150–450)
POTASSIUM SERPL-SCNC: 3.6 MMOL/L (ref 3.4–5.3)
PROT SERPL-MCNC: 7.8 G/DL (ref 6.8–8.8)
RBC # BLD AUTO: 2.76 10E12/L (ref 4.4–5.9)
RBC #/AREA URNS AUTO: ABNORMAL /HPF
SODIUM SERPL-SCNC: 142 MMOL/L (ref 133–144)
SOURCE: ABNORMAL
SP GR UR STRIP: <=1.005 (ref 1–1.03)
SPECIMEN SOURCE: NORMAL
UROBILINOGEN UR STRIP-ACNC: 0.2 EU/DL (ref 0.2–1)
WBC # BLD AUTO: 13.7 10E9/L (ref 4–11)
WBC #/AREA URNS AUTO: >100 /HPF

## 2018-11-20 PROCEDURE — 85027 COMPLETE CBC AUTOMATED: CPT | Performed by: NURSE PRACTITIONER

## 2018-11-20 PROCEDURE — 92592 HC HEARING AID CHECK, MONAURAL: CPT | Performed by: AUDIOLOGIST

## 2018-11-20 PROCEDURE — 93000 ELECTROCARDIOGRAM COMPLETE: CPT | Performed by: NURSE PRACTITIONER

## 2018-11-20 PROCEDURE — 87641 MR-STAPH DNA AMP PROBE: CPT | Performed by: NURSE PRACTITIONER

## 2018-11-20 PROCEDURE — 87640 STAPH A DNA AMP PROBE: CPT | Mod: 59 | Performed by: NURSE PRACTITIONER

## 2018-11-20 PROCEDURE — 99207 ZZC NO CHARGE LOS: CPT | Performed by: AUDIOLOGIST

## 2018-11-20 PROCEDURE — 36415 COLL VENOUS BLD VENIPUNCTURE: CPT | Performed by: NURSE PRACTITIONER

## 2018-11-20 PROCEDURE — 87088 URINE BACTERIA CULTURE: CPT | Performed by: NURSE PRACTITIONER

## 2018-11-20 PROCEDURE — 81001 URINALYSIS AUTO W/SCOPE: CPT | Performed by: NURSE PRACTITIONER

## 2018-11-20 PROCEDURE — 90686 IIV4 VACC NO PRSV 0.5 ML IM: CPT | Performed by: NURSE PRACTITIONER

## 2018-11-20 PROCEDURE — 99215 OFFICE O/P EST HI 40 MIN: CPT | Mod: 25 | Performed by: NURSE PRACTITIONER

## 2018-11-20 PROCEDURE — 87086 URINE CULTURE/COLONY COUNT: CPT | Performed by: NURSE PRACTITIONER

## 2018-11-20 PROCEDURE — 90471 IMMUNIZATION ADMIN: CPT | Performed by: NURSE PRACTITIONER

## 2018-11-20 PROCEDURE — 87186 SC STD MICRODIL/AGAR DIL: CPT | Performed by: NURSE PRACTITIONER

## 2018-11-20 PROCEDURE — 80053 COMPREHEN METABOLIC PANEL: CPT | Performed by: NURSE PRACTITIONER

## 2018-11-20 RX ORDER — NITROFURANTOIN 25; 75 MG/1; MG/1
100 CAPSULE ORAL 2 TIMES DAILY
Qty: 14 CAPSULE | Refills: 0 | Status: SHIPPED | OUTPATIENT
Start: 2018-11-20 | End: 2018-12-20

## 2018-11-20 NOTE — PROGRESS NOTES
[facility]  [address] [address]  [phone]  Dept: [phone]    PRE-OP EVALUATION:  Today's date: 2018    Abhijeet Mccauley (: 1960) presents for pre-operative evaluation assessment as requested by Dr. Pan.  He requires evaluation and anesthesia risk assessment prior to undergoing surgery/procedure for treatment of left Knee hardware replacement .    Proposed Surgery/ Procedure: left TKA  Date of Surgery/ Procedure: 2018  Time of Surgery/ Procedure: to be determined  Hospital/Surgical Facility: [facility]    Primary Physician: Parrish Funk  Type of Anesthesia Anticipated: to be determined    Patient has a Health Care Directive or Living Will:  NO    1. NO - Do you have a history of heart attack, stroke, stent, bypass or surgery on an artery in the head, neck, heart or legs?  2. NO - Do you ever have any pain or discomfort in your chest?  3. NO - Do you have a history of  Heart Failure?  4. NO - Are you troubled by shortness of breath when: walking on the level, up a slight hill or at night?  5. NO - Do you currently have a cold, bronchitis or other respiratory infection?  6. NO - Do you have a cough, shortness of breath or wheezing?  7. NO - Do you sometimes get pains in the calves of your legs when you walk?  8. YES - Do you or anyone in your family have previous history of blood clots? Complications of chemotherapy- developed a line DVT and PE- was on anticoagulation for 6 months  9. NO - Do you or does anyone in your family have a serious bleeding problem such as prolonged bleeding following surgeries or cuts?  10. NO - Have you ever had problems with anemia or been told to take iron pills?  11. NO - Have you had any abnormal blood loss such as black, tarry or bloody stools, or abnormal vaginal bleeding?  12. YES - Have you ever had a blood transfusion? Due to AML and Anemia  13. NO - Have you or any of your relatives ever had problems  with anesthesia?  14. NO - Do you have sleep apnea, excessive snoring or daytime drowsiness?  15. NO - Do you have any prosthetic heart valves?  16. YES - Do you have prosthetic joints? History of Left TKA  17. NO - Is there any chance that you may be pregnant?      HPI:     HPI related to upcoming procedure: patient with history of left TKA, states that he has been having more pain in the knee- therefore follows with orthopedics and plan to have replaced again.       NORMOCYTIC ANEMIA -- related to CINDY- Patient has a recent history of moderate-severe anemia, which has not been symptomatic. Work up to date has revealed  Treatment has been iron infusions- unable to tolerate oral iron.      NO source of bleeding was found on EGD and colonoscopy on 3/2018        HISTORY OF PERSISTENT LEUKOCYTOSIS:                                                                                                                                   .  ASTHMA - Patient has a longstanding history of moderate-severe Asthma . Patient has been doing well overall noting NO SYMPTOMS and continues on medication regimen consisting of Albuterol and Dulera without adverse reactions or side effects.                                                                                                                                               .  HYPERTENSION - Patient has longstanding history of HTN , currently denies any symptoms referable to elevated blood pressure. Specifically denies chest pain, palpitations, dyspnea, orthopnea, PND or peripheral edema. Blood pressure readings have been in normal range. Current medication regimen is as listed below. Patient denies any side effects of medication.                                                                                                                                                                                          .  RENAL INSUFFICIENCY - Patient has a longstanding history of moderate-severe  chronic renal insufficiency. Last Cr 2.11- baseline 1.7.      AML: diagnosed in 2008- in remission- follows with Oncology- recent bone marrow biopsy 7/2018 did not show evidence of recurrence.     Alcoholic cirrhosis of Liver: continues to drink alcohol- reports Drinking 6 beers/day    ALCOHOL AND TOBACCO DEPENDENCE: continues to smoke cigarettes and drink alcohol     PEPTIC ULCER DISEASE: Patient on Protonix                                                                                                                                                   .    MEDICAL HISTORY:     Patient Active Problem List    Diagnosis Date Noted     Normocytic anemia 05/17/2018     Priority: Medium     Leukocytosis with increased monocytes 05/17/2018     Priority: Medium     Generalized weakness 05/17/2018     Priority: Medium     Tubular adenoma of colon 03/23/2018     Priority: Medium     Collected: 3/18/2018   Received: 3/19/2018   Reported: 3/22/2018 19:19   Ordering Phy(s): SASKIA LEE     For improved result formatting, select 'View Enhanced Report Format' under    Linked Documents section.     SPECIMEN(S):   A: Splenic flexure polyp   B: Rectal polyp     FINAL DIAGNOSIS:   A.  Colon, splenic flexure, mucosal biopsies:   - Tubular adenoma.   - Negative for high-grade dysplasia and malignancy.     B.  Rectum, mucosal biopsy:   - Tubular adenoma.   - Negative for high-grade dysplasia and malignancy.        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 Provider to review       Acute myeloid leukemia in remission (H) 03/28/2011     Priority: Medium     S/p chemo, did not need bone marrow transplant        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)     s/p chemo, no bone marrow transplant     Chronic obstructive pulmonary disease 5/17/2018     COPD (chronic obstructive pulmonary disease) (H)      History of pulmonary embolus (PE)      Hypertension      PUD (peptic ulcer disease)      Tobacco dependence      Ulcerative colitis (H)      Past Surgical History:   Procedure Laterality Date     AS TOTAL KNEE ARTHROPLASTY Left      BONE MARROW BIOPSY, BONE SPECIMEN, NEEDLE/TROCAR N/A 6/12/2018    Procedure: BIOPSY BONE MARROW;  Bone Marrow Biopsy;  Surgeon: Demar Sapp MD;  Location: [facility]     ESOPHAGOSCOPY, GASTROSCOPY, DUODENOSCOPY (EGD), COMBINED N/A 2/8/2018    Procedure: COMBINED ESOPHAGOSCOPY, GASTROSCOPY, DUODENOSCOPY (EGD), BIOPSY SINGLE OR MULTIPLE;  gastroscopy with biopsies;  Surgeon: Raz Cobb MD;  Location: [facility]     ESOPHAGOSCOPY, GASTROSCOPY, DUODENOSCOPY (EGD), COMBINED N/A 3/18/2018    Procedure: COMBINED ESOPHAGOSCOPY, GASTROSCOPY, DUODENOSCOPY (EGD);;  Surgeon: Raz Cobb MD;  Location: [facility]     LACERATION REPAIR Right     Right leg     Current Outpatient Prescriptions   Medication Sig Dispense Refill     acetaminophen (TYLENOL) 500  "MG tablet Take 1-2 tablets by mouth every 6 hours as needed.       albuterol (PROAIR HFA/PROVENTIL HFA/VENTOLIN HFA) 108 (90 BASE) MCG/ACT Inhaler Inhale 2 puffs into the lungs every 4 hours as needed 1 Inhaler 11     baclofen (LIORESAL) 10 MG tablet Take 1/2 to one tablet up to three times a day 90 tablet 1     desonide (DESOWEN) 0.05 % lotion Apply topically as needed 118 mL 1     furosemide (LASIX) 20 MG tablet Take 1 tablet (20 mg) by mouth every morning 30 tablet 11     loratadine (CLARITIN) 10 MG tablet Take 1 tablet by mouth daily as needed for allergies. 30 tablet 6     Melatonin ER 5 MG TBCR Take 1 tablet by mouth At Bedtime 30 tablet 11     mometasone-formoterol (DULERA) 200-5 MCG/ACT oral inhaler Inhale 2 puffs into the lungs 2 times daily        nitroFURantoin, macrocrystal-monohydrate, (MACROBID) 100 MG capsule Take 1 capsule (100 mg) by mouth 2 times daily 14 capsule 0     order for DME Equipment being ordered: shower chair 1 Device 0     PANTOPRAZOLE SODIUM PO Take 40 mg by mouth every morning (before breakfast)       potassium chloride SA (K-DUR/KLOR-CON M) 10 MEQ CR tablet Take 1 tablet (10 mEq) by mouth daily 90 tablet 3     propranolol (INDERAL) 20 MG tablet Take 1 tablet (20 mg) by mouth 2 times daily 90 tablet 3     SODIUM BICARBONATE PO Take 650 mg by mouth daily as needed        tiotropium (SPIRIVA) 18 MCG capsule Inhale 1 capsule into the lungs daily Using PRN       traZODone (DESYREL) 50 MG tablet Take 1 tablet (50 mg) by mouth At Bedtime 90 tablet 3     OTC products: None, except as noted above    Allergies   Allergen Reactions     Blood Transfusion Related (Informational Only)      Patient has a history of a clinically significant antibody against RBC antigens.  A delay in compatible RBCs may occur.     Famotidine Unknown and Other (See Comments)     Severe abdominal cramps     Cyclobenzaprine      hives     Gabapentin      Made pt's \"face break out\"     Methocarbamol      Made pt's \"face " "break out\"     Pepcid Cramps     Severe abdominal cramps     Vancomycin      Other reaction(s): Other  hallucinations     Vfend      IV     Hydrocodone-Acetaminophen Rash      Latex Allergy: NO    Social History   Substance Use Topics     Smoking status: Current Every Day Smoker     Packs/day: 0.50     Years: 30.00     Types: Cigarettes     Smokeless tobacco: Never Used      Comment: I strongly emphasized the importance of quitting smoking. 4-5 daily     Alcohol use Yes      Comment: Down to 3 beers per day.  Sometimes a cocktail also.     History   Drug Use     Yes     Special: Marijuana       REVIEW OF SYSTEMS:   CONSTITUTIONAL: NEGATIVE for fever, chills, change in weight  INTEGUMENTARY/SKIN: NEGATIVE for worrisome rashes, moles or lesions  EYES: NEGATIVE for vision changes or irritation  ENT/MOUTH: NEGATIVE for ear, mouth and throat problems  RESP: NEGATIVE for significant cough or SOB  BREAST: NEGATIVE for masses, tenderness or discharge  CV: NEGATIVE for chest pain, palpitations or peripheral edema  GI: NEGATIVE for nausea, abdominal pain, heartburn, or change in bowel habits  : NEGATIVE for frequency, dysuria, or hematuria  MUSCULOSKELETAL:LEFT KNEE PAIN  NEURO: NEGATIVE for weakness, dizziness or paresthesias  ENDOCRINE: NEGATIVE for temperature intolerance, skin/hair changes  HEME: NEGATIVE for bleeding problems  PSYCHIATRIC: NEGATIVE for changes in mood or affect    EXAM:   /60 (BP Location: Left arm, Patient Position: Chair, Cuff Size: Adult Large)  Pulse 81  Temp 98  F (36.7  C) (Tympanic)  Resp 13  Wt 188 lb (85.3 kg)  SpO2 98%  BMI 31.28 kg/m2    GENERAL APPEARANCE: healthy and pale     EYES: EOMI,  PERRL     HENT: ear canals and TM's normal and nose and mouth without ulcers or lesions     NECK: no adenopathy, no asymmetry, masses, or scars and thyroid normal to palpation     RESP: lungs clear to auscultation - no rales, rhonchi or wheezes     CV: regular rates and rhythm, normal S1 S2, " no S3 or S4 and no murmur, click or rub     ABDOMEN:  soft, nontender, no HSM or masses and bowel sounds normal     MS: extremities normal- no gross deformities noted, no evidence of inflammation in joints, FROM in all extremities.     SKIN: no suspicious lesions or rashes     NEURO: Normal strength and tone, sensory exam grossly normal, mentation intact and speech normal     PSYCH: mentation appears normal. and affect normal/bright     LYMPHATICS: No cervical adenopathy    DIAGNOSTICS:   EKG: Sinus Rhythm and negative T waves    Last Comprehensive Metabolic Panel:  Sodium   Date Value Ref Range Status   11/20/2018 142 133 - 144 mmol/L Final     Potassium   Date Value Ref Range Status   11/20/2018 3.6 3.4 - 5.3 mmol/L Final     Chloride   Date Value Ref Range Status   11/20/2018 108 94 - 109 mmol/L Final     Carbon Dioxide   Date Value Ref Range Status   11/20/2018 24 20 - 32 mmol/L Final     Anion Gap   Date Value Ref Range Status   11/20/2018 10 3 - 14 mmol/L Final     Glucose   Date Value Ref Range Status   11/20/2018 91 70 - 99 mg/dL Final     Comment:     Non Fasting     Urea Nitrogen   Date Value Ref Range Status   11/20/2018 28 7 - 30 mg/dL Final     Creatinine   Date Value Ref Range Status   11/20/2018 2.11 (H) 0.66 - 1.25 mg/dL Final     GFR Estimate   Date Value Ref Range Status   11/20/2018 32 (L) >60 mL/min/1.7m2 Final     Comment:     Non  GFR Calc     Calcium   Date Value Ref Range Status   11/20/2018 8.3 (L) 8.5 - 10.1 mg/dL Final     Bilirubin Total   Date Value Ref Range Status   11/20/2018 1.3 0.2 - 1.3 mg/dL Final     Alkaline Phosphatase   Date Value Ref Range Status   11/20/2018 290 (H) 40 - 150 U/L Final     ALT   Date Value Ref Range Status   11/20/2018 34 0 - 70 U/L Final     AST   Date Value Ref Range Status   11/20/2018 51 (H) 0 - 45 U/L Final             Lab Results   Component Value Date    WBC 13.7 11/20/2018     Lab Results   Component Value Date    RBC 2.76 11/20/2018      Lab Results   Component Value Date    HGB 8.3 11/20/2018     Lab Results   Component Value Date    HCT 27.4 11/20/2018     Lab Results   Component Value Date    MCV 99 11/20/2018     Lab Results   Component Value Date    MCH 30.1 11/20/2018     Lab Results   Component Value Date    MCHC 30.3 11/20/2018     Lab Results   Component Value Date    RDW 17.3 11/20/2018     Lab Results   Component Value Date     11/20/2018         Recent Labs   Lab Test  10/10/18   0740  07/19/18   1452  05/31/18   1452  05/18/18   0506   03/18/18   0624   03/16/18   1425   HGB  10.0*  11.6*  9.2*  8.0*   < >  7.8*   < >  7.4*   PLT  127*  214  199  129*   < >  176   < >  179   INR   --    --    --    --    --   1.60*   --   1.34*   NA   --    --   139  142   --   140   < >  139   POTASSIUM   --    --   3.9  3.4   --   3.6   < >  4.0   CR   --    --   1.80*  1.72*   < >  2.00*   < >  1.80*    < > = values in this interval not displayed.        IMPRESSION:   Reason for surgery/procedure: LEFT TKA  Diagnosis/reason for consult: Preoperative exam    The proposed surgical procedure is considered INTERMEDIATE risk.    REVISED CARDIAC RISK INDEX  The patient has the following serious cardiovascular risks for perioperative complications such as (MI, PE, VFib and 3  AV Block):  No serious cardiac risks  INTERPRETATION: 0 risks: Class I (very low risk - 0.4% complication rate)    The patient has the following additional risks for perioperative complications:  No identified additional risks      ICD-10-CM    1. Chronic pain of left knee M25.562 Urine Microscopic    G89.29    2. Preop general physical exam Z01.818 Methicillin Resistant Staph Aureus PCR     FLU VACCINE, SPLIT VIRUS, IM (QUADRIVALENT) [19476]- >3 YRS     Vaccine Administration, Initial [49293]     EKG 12-lead complete w/read - Clinics     Comprehensive metabolic panel     CBC with platelets     EKG 12-lead complete w/read - Clinics   3. Acute myeloid leukemia in remission  (H) C92.01    4. CKD (chronic kidney disease) stage 3, GFR 30-59 ml/min (H) N18.3 Urine Culture Aerobic Bacterial     Basic metabolic panel   5. Tobacco dependence syndrome F17.200    6. Uncomplicated alcohol dependence (H) F10.20    7. Essential hypertension I10 Comprehensive metabolic panel     CBC with platelets   8. Peptic ulcer disease K27.9 CBC with platelets   9. Normocytic anemia D64.9 Ferritin     IRON     Reticulocyte count     TSH     Vitamin B12     Folate   10. Mild intermittent asthma without complication J45.20    11. Coagulation defect (H) D68.9    12. Dysuria R30.0 UA reflex to Microscopic and Culture     nitroFURantoin, macrocrystal-monohydrate, (MACROBID) 100 MG capsule     Urine Culture Aerobic Bacterial   13. Nonspecific finding on examination of urine R82.90 Urine Culture Aerobic Bacterial   14. Need for prophylactic vaccination and inoculation against influenza Z23 FLU VACCINE, SPLIT VIRUS, IM (QUADRIVALENT) [90940]- >3 YRS     Vaccine Administration, Initial [54130]     Comprehensive metabolic panel     CBC with platelets   15. Abnormal electrocardiogram R94.31 NM Lexiscan stress test       RECOMMENDATIONS:     --Consult hospital rounder / IM to assist post-op medical management  --Because of DVT or PE history, patient should be on low molecular weight heparin and wear compression hose before & after surgery    Pulmonary Risk  Incentive spirometry post op  Respiratory Therapy (Respiratory Care IP Consult)  post op  NG tube decompression if abdominal distension or significant vomiting   Advised smoking cessation.       Anemia  Anemia and requires treatment prior to surgery.       --Patient is to take all scheduled medications on the day of surgery EXCEPT for modifications listed below.    Surgery is NOT recommended due to severe anemia that must be corrected prior to elective surgery. Anemia treatment plan:   Surgery is NOT recommended due to abnormal EKG and needs to undergo stress test prior  to elective surgery .   Surgery is NOT recommended due to current UTI- will need antibiotics for treatment and then repeat urine sample prior to elective surgery.        Signed Electronically by: LOPEZ Talbot CNP    Copy of this evaluation report is provided to requesting physician.    [facility] Preop Guidelines    Revised Cardiac Risk Index  Injectable Influenza Immunization Documentation    1.  Is the person to be vaccinated sick today?   No    2. Does the person to be vaccinated have an allergy to a component   of the vaccine?   No  Egg Allergy Algorithm Link    3. Has the person to be vaccinated ever had a serious reaction   to influenza vaccine in the past?   No    4. Has the person to be vaccinated ever had Guillain-Barré syndrome?   No    Form completed by Khushbu Alexander

## 2018-11-20 NOTE — NURSING NOTE
"Initial /60 (BP Location: Left arm, Patient Position: Chair, Cuff Size: Adult Large)  Pulse 81  Temp 98  F (36.7  C) (Tympanic)  Resp 13  Wt 188 lb (85.3 kg)  SpO2 98%  BMI 31.28 kg/m2 Estimated body mass index is 31.28 kg/(m^2) as calculated from the following:    Height as of 7/19/18: 5' 5\" (1.651 m).    Weight as of this encounter: 188 lb (85.3 kg). .    Khushbu Alexander    "

## 2018-11-20 NOTE — MR AVS SNAPSHOT
After Visit Summary   11/20/2018    Abhijeet Mccauley    MRN: [id number]           Patient Information     Date Of Birth          1960        Visit Information        Provider Department      11/20/2018 4:00 PM Wilda Anderson AuD [facility]        Today's Diagnoses     Sensorineural hearing loss, bilateral    -  1       Follow-ups after your visit        Your next 10 appointments already scheduled     Dec 12, 2018   Procedure with Morgan Pan MD   [facility] (--)    [address] [address] [phone]           The medical center is located at [address]. (between [address] in [place], [place] miles north of [place]).            Feb 13, 2019  2:30 PM CST   Return Visit with Landen Quiñones MD   [facility] ([facility])    [address]  [address]  [facility] [address] [phone]              Who to contact     If you have questions or need follow up information about today's clinic visit or your schedule please contact [facility] directly at [phone].  Normal or non-critical lab and imaging results will be communicated to you by MyChart, letter or phone within 4 business days after the clinic has received the results. If you do not hear from us within 7 days, please contact the clinic through MyChart or phone. If you have a critical or abnormal lab result, we will notify you by phone as soon as possible.  Submit refill requests through [employer] or call your pharmacy and they will forward the refill request to us. Please allow 3 business days for your refill to be completed.          Additional Information About Your Visit        Care EveryWhere ID     This is your Care EveryWhere ID. This could be used by other organizations to access your [facility] medical records  [id number]         Blood Pressure from Last 3  Encounters:   11/20/18 124/60   11/08/18 137/74   08/07/18 145/73    Weight from Last 3 Encounters:   11/20/18 188 lb (85.3 kg)   08/02/18 190 lb (86.2 kg)   07/19/18 183 lb 11.2 oz (83.3 kg)              We Performed the Following     HEARING AID CHECK, MONAURAL          Today's Medication Changes          These changes are accurate as of 11/20/18  4:59 PM.  If you have any questions, ask your nurse or doctor.               Start taking these medicines.        Dose/Directions    nitroFURantoin (macrocrystal-monohydrate) 100 MG capsule   Commonly known as:  MACROBID   Used for:  Dysuria   Started by:  Breana Lopes APRN CNP        Dose:  100 mg   Take 1 capsule (100 mg) by mouth 2 times daily   Quantity:  14 capsule   Refills:  0            Where to get your medicines      These medications were sent to [facility] [address] [address]     Phone:  [phone]     nitroFURantoin (macrocrystal-monohydrate) 100 MG capsule                Primary Care Provider Office Phone # Fax #    Parrish Funk [phone]       [address] [place] [address]        Equal Access to Services     JORGE COOL AH: Hadii sameera ku hadasho Soomaali, waaxda luqadaha, qaybta kaalmada adeegyada, waxbill burroughsin haytimothy ureña. So [facility] [phone].    ATENCIÓN: Si habla español, tiene a uriostegui disposición servicios gratuitos de asistencia lingüística. Claudy waldron [phone].    We comply with applicable federal civil rights laws and [place] laws. We do not discriminate on the basis of race, color, national origin, age, disability, sex, sexual orientation, or gender identity.            Thank you!     Thank you for choosing [facility]  for your care. Our goal is always to provide you with excellent care. Hearing back from our patients is one way we can continue to improve our services. Please take a few minutes to complete the  written survey that you may receive in the mail after your visit with us. Thank you!             Your Updated Medication List - Protect others around you: Learn how to safely use, store and throw away your medicines at www.disposemymeds.org.          This list is accurate as of 11/20/18  4:59 PM.  Always use your most recent med list.                   Brand Name Dispense Instructions for use Diagnosis    acetaminophen 500 MG tablet    TYLENOL     Take 1-2 tablets by mouth every 6 hours as needed.        albuterol 108 (90 Base) MCG/ACT inhaler    PROAIR HFA/PROVENTIL HFA/VENTOLIN HFA    1 Inhaler    Inhale 2 puffs into the lungs every 4 hours as needed    Mild intermittent asthma without complication       baclofen 10 MG tablet    LIORESAL    90 tablet    Take 1/2 to one tablet up to three times a day    Muscle spasm       desonide 0.05 % lotion    DESOWEN    118 mL    Apply topically as needed    Rosacea       furosemide 20 MG tablet    LASIX    30 tablet    Take 1 tablet (20 mg) by mouth every morning    Osteoarthritis of spine without myelopathy or radiculopathy, sacral and sacrococcygeal region       loratadine 10 MG tablet    CLARITIN    30 tablet    Take 1 tablet by mouth daily as needed for allergies.    Allergic rhinitis       Melatonin ER 5 MG Tbcr     30 tablet    Take 1 tablet by mouth At Bedtime    Gastroesophageal reflux disease without esophagitis       mometasone-formoterol 200-5 MCG/ACT oral inhaler    DULERA     Inhale 2 puffs into the lungs 2 times daily        nitroFURantoin (macrocrystal-monohydrate) 100 MG capsule    MACROBID    14 capsule    Take 1 capsule (100 mg) by mouth 2 times daily    Dysuria       order for DME     1 Device    Equipment being ordered: shower chair    Acute myeloid leukemia in remission (H)       PANTOPRAZOLE SODIUM PO      Take 40 mg by mouth every morning (before breakfast)        potassium chloride SA 10 MEQ CR tablet    K-DUR/KLOR-CON M    90 tablet    Take 1 tablet  (10 mEq) by mouth daily    Gastroesophageal reflux disease without esophagitis       propranolol 20 MG tablet    INDERAL    90 tablet    Take 1 tablet (20 mg) by mouth 2 times daily    Gastroesophageal reflux disease without esophagitis       SODIUM BICARBONATE PO      Take 650 mg by mouth daily as needed        tiotropium 18 MCG capsule    SPIRIVA     Inhale 1 capsule into the lungs daily Using PRN        traZODone 50 MG tablet    DESYREL    90 tablet    Take 1 tablet (50 mg) by mouth At Bedtime    Osteoarthritis of spine without myelopathy or radiculopathy, sacral and sacrococcygeal region

## 2018-11-20 NOTE — PROGRESS NOTES
HEARING AID RECHECK    Patient Name:  Abhijeet Mccauley    Patient Age:   57 year old    :  1960    Background:   Patient dropped in requesting repair of his right 2014 Phonak Jteo [id number]. He reports that he was attempting to change the wax guard but could not remove it using the removal tool, so he instead used a tweezers. He was then unable to reinsert a wax guard.     Procedures:   Disassembled SlimTip from  and relaced wax guard. Listening check found good sound quality and output. Warned patient to only use correct tools to clean his hearing aids.     Plan:   Return as needed for audiologic services.     Destiney Pulido, CCC-A  Licensed Audiologist  2018

## 2018-11-20 NOTE — MR AVS SNAPSHOT
After Visit Summary   11/20/2018    Abhijeet Mccauley    MRN: [id number]           Patient Information     Date Of Birth          1960        Visit Information        Provider Department      11/20/2018 2:00 PM Breana Lopes APRN [facility]        Today's Diagnoses     Chronic pain of left knee    -  1    Preop general physical exam        Need for prophylactic vaccination and inoculation against influenza        Acute myeloid leukemia in remission (H)        CKD (chronic kidney disease) stage 3, GFR 30-59 ml/min (H)        Tobacco dependence syndrome        Uncomplicated alcohol dependence (H)        Essential hypertension        Peptic ulcer disease        Normocytic anemia        Mild intermittent asthma without complication        Coagulation defect (H)        Dysuria          Care Instructions      Before Your Surgery      Call your surgeon if there is any change in your health. This includes signs of a cold or flu (such as a sore throat, runny nose, cough, rash or fever).    Do not smoke, drink alcohol or take over the counter medicine (unless your surgeon or primary care doctor tells you to) for the 24 hours before and after surgery.    If you take prescribed drugs: Follow your doctor s orders about which medicines to take and which to stop until after surgery.    Eating and drinking prior to surgery: follow the instructions from your surgeon    Take a shower or bath the night before surgery. Use the soap your surgeon gave you to gently clean your skin. If you do not have soap from your surgeon, use your regular soap. Do not shave or scrub the surgery site.  Wear clean pajamas and have clean sheets on your bed.           Follow-ups after your visit        Your next 10 appointments already scheduled     Dec 12, 2018   Procedure with Morgan Pan MD   [facility] (--)    [address] [facility] [address] [phone]           The  medical center is located at [address] (between [address] in [place], [place] north of [place]).            Feb 13, 2019  2:30 PM CST   Return Visit with Landen Quiñones MD   [facility] ([facility])    [address]  [address]  [facility] [address] [phone]              Who to contact     If you have questions or need follow up information about today's clinic visit or your schedule please contact [facility] directly at [phone].  Normal or non-critical lab and imaging results will be communicated to you by MyChart, letter or phone within 4 business days after the clinic has received the results. If you do not hear from us within 7 days, please contact the clinic through MyChart or phone. If you have a critical or abnormal lab result, we will notify you by phone as soon as possible.  Submit refill requests through [employer] or call your pharmacy and they will forward the refill request to us. Please allow 3 business days for your refill to be completed.          Additional Information About Your Visit        Care EveryWhere ID     This is your Care EveryWhere ID. This could be used by other organizations to access your [facility] medical records  [id number]        Your Vitals Were     Pulse Temperature Respirations Pulse Oximetry BMI (Body Mass Index)       81 98  F (36.7  C) (Tympanic) 13 98% 31.28 kg/m2        Blood Pressure from Last 3 Encounters:   11/20/18 124/60   11/08/18 137/74   08/07/18 145/73    Weight from Last 3 Encounters:   11/20/18 188 lb (85.3 kg)   08/02/18 190 lb (86.2 kg)   07/19/18 183 lb 11.2 oz (83.3 kg)              We Performed the Following     CBC with platelets     Comprehensive metabolic panel     EKG 12-lead complete w/read - Clinics     FLU VACCINE, SPLIT VIRUS, IM (QUADRIVALENT) [54845]- >3 YRS     Methicillin Resistant Staph Aureus PCR     UA reflex to  Microscopic and Culture     Vaccine Administration, Initial [[address]]        Primary Care Provider Office Phone # Fax #    Parrish Funk [phone] [address] [address]        Equal Access to Services     JORGE COOL : Hadii aad ku hadmitchelo Soomaali, waaxda luqadaha, qaybta kaalmada adedelroyyada, song diane esemichael ivy jaxonalejandra ureña. So [facility] [phone].    ATENCIÓN: Si habla español, tiene a uriostegui disposición servicios gratuitos de asistencia lingüística. Llame al [phone].    We comply with applicable federal civil rights laws and [place] laws. We do not discriminate on the basis of race, color, national origin, age, disability, sex, sexual orientation, or gender identity.            Thank you!     Thank you for choosing [facility]  for your care. Our goal is always to provide you with excellent care. Hearing back from our patients is one way we can continue to improve our services. Please take a few minutes to complete the written survey that you may receive in the mail after your visit with us. Thank you!             Your Updated Medication List - Protect others around you: Learn how to safely use, store and throw away your medicines at www.disposemymeds.org.          This list is accurate as of 11/20/18  2:23 PM.  Always use your most recent med list.                   Brand Name Dispense Instructions for use Diagnosis    acetaminophen 500 MG tablet    TYLENOL     Take 1-2 tablets by mouth every 6 hours as needed.        albuterol 108 (90 Base) MCG/ACT inhaler    PROAIR HFA/PROVENTIL HFA/VENTOLIN HFA    1 Inhaler    Inhale 2 puffs into the lungs every 4 hours as needed    Mild intermittent asthma without complication       baclofen 10 MG tablet    LIORESAL    90 tablet    Take 1/2 to one tablet up to three times a day    Muscle spasm       desonide 0.05 % lotion    DESOWEN    118 mL    Apply topically as needed    Rosacea       furosemide 20 MG  tablet    LASIX    30 tablet    Take 1 tablet (20 mg) by mouth every morning    Osteoarthritis of spine without myelopathy or radiculopathy, sacral and sacrococcygeal region       loratadine 10 MG tablet    CLARITIN    30 tablet    Take 1 tablet by mouth daily as needed for allergies.    Allergic rhinitis       Melatonin ER 5 MG Tbcr     30 tablet    Take 1 tablet by mouth At Bedtime    Gastroesophageal reflux disease without esophagitis       mometasone-formoterol 200-5 MCG/ACT oral inhaler    DULERA     Inhale 2 puffs into the lungs 2 times daily        order for DME     1 Device    Equipment being ordered: shower chair    Acute myeloid leukemia in remission (H)       PANTOPRAZOLE SODIUM PO      Take 40 mg by mouth every morning (before breakfast)        potassium chloride SA 10 MEQ CR tablet    K-DUR/KLOR-CON M    90 tablet    Take 1 tablet (10 mEq) by mouth daily    Gastroesophageal reflux disease without esophagitis       propranolol 20 MG tablet    INDERAL    90 tablet    Take 1 tablet (20 mg) by mouth 2 times daily    Gastroesophageal reflux disease without esophagitis       SODIUM BICARBONATE PO      Take 650 mg by mouth daily as needed        tiotropium 18 MCG capsule    SPIRIVA     Inhale 1 capsule into the lungs daily Using PRN        traZODone 50 MG tablet    DESYREL    90 tablet    Take 1 tablet (50 mg) by mouth At Bedtime    Osteoarthritis of spine without myelopathy or radiculopathy, sacral and sacrococcygeal region

## 2018-11-20 NOTE — LETTER
November 23, 2018      Abhijeet Mccauley  [address] [place] [address]        Dear ,    We are writing to inform you of your test results.    Needs some lab work redrawn and additional lab work done because of abnormal lab results.   Your HGB was low, kidney function was elevated.   To schedule a lab appointment you can call [phone].  You also need a stress test which I ordered prior to his surgery.  Cardiology will call you to schedule if not the number is [phone]  I cannot approve his surgery until this is complete.    Resulted Orders   Methicillin Resistant Staph Aureus PCR   Result Value Ref Range    Specimen Description Nares     Methicillin Resist/Sens S. aureus PCR Negative NEG^Negative      Comment:      MRSA Negative: SA Positive  MRSA target DNA not detected, presumed negative for MRSA colonization or the   number of bacteria present may be below the limit of detection.  Staphylococcus aureus target DNA detected, presumed positive for SA   colonization.  A positive test does not necessarily indicate the presence of viable   organisms.  It is, however, presumptive for the presence of SA.  This result   does not preclude MRSA nasal colonization.  FDA approved assay performed using [employer] GeneXpert(R) real-time PCR.     UA reflex to Microscopic and Culture   Result Value Ref Range    Color Urine Yellow     Appearance Urine Cloudy     Glucose Urine Negative NEG^Negative mg/dL    Bilirubin Urine Negative NEG^Negative    Ketones Urine Negative NEG^Negative mg/dL    Specific Gravity Urine <=1.005 1.003 - 1.035    Blood Urine Large (A) NEG^Negative    pH Urine 7.0 5.0 - 7.0 pH    Protein Albumin Urine Negative NEG^Negative mg/dL    Urobilinogen Urine 0.2 0.2 - 1.0 EU/dL    Nitrite Urine Negative NEG^Negative    Leukocyte Esterase Urine Large (A) NEG^Negative    Source Midstream Urine    Comprehensive metabolic panel   Result Value Ref Range    Sodium 142 133 - 144 mmol/L    Potassium  3.6 3.4 - 5.3 mmol/L    Chloride 108 94 - 109 mmol/L    Carbon Dioxide 24 20 - 32 mmol/L    Anion Gap 10 3 - 14 mmol/L    Glucose 91 70 - 99 mg/dL      Comment:      Non Fasting    Urea Nitrogen 28 7 - 30 mg/dL    Creatinine 2.11 (H) 0.66 - 1.25 mg/dL    GFR Estimate 32 (L) >60 mL/min/1.7m2      Comment:      Non  GFR Calc    GFR Estimate If Black 39 (L) >60 mL/min/1.7m2      Comment:       GFR Calc    Calcium 8.3 (L) 8.5 - 10.1 mg/dL    Bilirubin Total 1.3 0.2 - 1.3 mg/dL    Albumin 3.3 (L) 3.4 - 5.0 g/dL    Protein Total 7.8 6.8 - 8.8 g/dL    Alkaline Phosphatase 290 (H) 40 - 150 U/L    ALT 34 0 - 70 U/L    AST 51 (H) 0 - 45 U/L   CBC with platelets   Result Value Ref Range    WBC 13.7 (H) 4.0 - 11.0 10e9/L    RBC Count 2.76 (L) 4.4 - 5.9 10e12/L    Hemoglobin 8.3 (L) 13.3 - 17.7 g/dL    Hematocrit 27.4 (L) 40.0 - 53.0 %    MCV 99 78 - 100 fl    MCH 30.1 26.5 - 33.0 pg    MCHC 30.3 (L) 31.5 - 36.5 g/dL    RDW 17.3 (H) 10.0 - 15.0 %    Platelet Count 194 150 - 450 10e9/L   Urine Microscopic   Result Value Ref Range    WBC Urine >100 (A) OTO5^0 - 5 /HPF    RBC Urine 5-10 (A) OTO2^O - 2 /HPF    Bacteria Urine Many (A) NEG^Negative /HPF   Urine Culture Aerobic Bacterial   Result Value Ref Range    Specimen Description Midstream Urine     Special Requests Specimen received in preservative     Culture Micro (A)      50,000 to 100,000 colonies/mL  Enterobacter cloacae complex  Additional susceptibilities in progress  11/23/18      Culture Micro (A)      50,000 to 100,000 colonies/mL  Strain 2  Enterobacter cloacae complex         If you have any questions or concerns, please call the clinic at the number listed above.       Sincerely,        LOPEZ Galloway CNP

## 2018-11-23 DIAGNOSIS — D64.9 NORMOCYTIC ANEMIA: Primary | ICD-10-CM

## 2018-11-23 DIAGNOSIS — N18.30 CHRONIC KIDNEY DISEASE, STAGE III (MODERATE) (H): ICD-10-CM

## 2018-11-24 LAB
BACTERIA SPEC CULT: ABNORMAL
BACTERIA SPEC CULT: ABNORMAL
Lab: ABNORMAL
SPECIMEN SOURCE: ABNORMAL

## 2018-12-01 DIAGNOSIS — L71.9 ROSACEA: ICD-10-CM

## 2018-12-03 NOTE — TELEPHONE ENCOUNTER
Routing refill request to provider for review/approval because:  Drug not on the [facility] refill protocol     Requested Prescriptions   Pending Prescriptions Disp Refills     desonide (DESOWEN) 0.05 % external lotion 118 mL 1     Sig: Apply topically as needed    There is no refill protocol information for this order        Last Written Prescription Date:  8/16/17  Last Fill Quantity: 118 mL,  # refills: 1   Last office visit: 11/20/2018 with GIANCARLO Lopes CNP  Future Office Visit:   Next 5 appointments (look out 90 days)     Dec 05, 2018 11:20 AM CST   SHORT with Chidi Valenzuela MD   [facility]    [address] [address]   [phone]            Feb 13, 2019  2:30 PM CST   Return Visit with Landen Quiñones MD   [facility] [place] ([facility])    [address]  [address]  [facility] [address]   [phone]                 Angelina HDZ RN

## 2018-12-04 DIAGNOSIS — K21.9 GASTROESOPHAGEAL REFLUX DISEASE WITHOUT ESOPHAGITIS: ICD-10-CM

## 2018-12-04 NOTE — TELEPHONE ENCOUNTER
"HISTORICAL  Requested Prescriptions   Pending Prescriptions Disp Refills     pantoprazole (PROTONIX) 40 MG EC tablet [Pharmacy Med Name: PANTOPRAZOLE SODIUM 40MG TBEC] 30 tablet 11    Last Written Prescription Date:  12/4/18  Last Fill Quantity: 30,  # refills: 11   Last office visit: 11/20/2018 with prescribing provider:  [facility]   Future Office Visit:   Next 5 appointments (look out 90 days)     Dec 05, 2018 11:20 AM CST   SHORT with Chidi Valenzuela MD   [facility]    [address] [facility] [address]   [phone]            Feb 13, 2019  2:30 PM CST   Return Visit with Landen Quiñones MD   [facility] ([facility] [place])    [address]  [address]  [facility] [address]   [phone]                  Sig: TAKE ONE TABLET BY MOUTH EVERY MORNING BEFORE BREAKFAST    PPI Protocol Passed    12/4/2018  9:46 AM       Passed - Not on Clopidogrel (unless Pantoprazole ordered)       Passed - No diagnosis of osteoporosis on record       Passed - Recent (12 mo) or future (30 days) visit within the authorizing provider's specialty    Patient had office visit in the last 12 months or has a visit in the next 30 days with authorizing provider or within the authorizing provider's specialty.  See \"Patient Info\" tab in inbasket, or \"Choose Columns\" in Meds & Orders section of the refill encounter.             Passed - Patient is age 18 or older          "

## 2018-12-04 NOTE — TELEPHONE ENCOUNTER
Routing refill request to provider for review/approval because:  Medication is reported/historical  Has appt scheduled with Dr. Valenzuela 12/5/18    Angelina HDZ RN

## 2018-12-05 ENCOUNTER — OFFICE VISIT (OUTPATIENT)
Dept: FAMILY MEDICINE | Facility: CLINIC | Age: 58
End: 2018-12-05
Payer: COMMERCIAL

## 2018-12-05 VITALS
HEART RATE: 76 BPM | OXYGEN SATURATION: 96 % | SYSTOLIC BLOOD PRESSURE: 126 MMHG | RESPIRATION RATE: 14 BRPM | WEIGHT: 194.8 LBS | TEMPERATURE: 97.5 F | BODY MASS INDEX: 32.42 KG/M2 | DIASTOLIC BLOOD PRESSURE: 64 MMHG

## 2018-12-05 DIAGNOSIS — N30.01 ACUTE CYSTITIS WITH HEMATURIA: ICD-10-CM

## 2018-12-05 DIAGNOSIS — R07.9 CHEST PAIN, UNSPECIFIED TYPE: ICD-10-CM

## 2018-12-05 DIAGNOSIS — D64.9 NORMOCYTIC ANEMIA: Primary | ICD-10-CM

## 2018-12-05 DIAGNOSIS — R82.998 DARK URINE: ICD-10-CM

## 2018-12-05 DIAGNOSIS — R82.90 NONSPECIFIC FINDING ON EXAMINATION OF URINE: ICD-10-CM

## 2018-12-05 DIAGNOSIS — N18.30 CHRONIC KIDNEY DISEASE, STAGE III (MODERATE) (H): ICD-10-CM

## 2018-12-05 LAB
ABO + RH BLD: NORMAL
ABO + RH BLD: NORMAL
ALBUMIN UR-MCNC: ABNORMAL MG/DL
ANION GAP SERPL CALCULATED.3IONS-SCNC: 9 MMOL/L (ref 3–14)
APPEARANCE UR: ABNORMAL
BACTERIA #/AREA URNS HPF: ABNORMAL /HPF
BILIRUB DIRECT SERPL-MCNC: 0.8 MG/DL (ref 0–0.2)
BILIRUB SERPL-MCNC: 1.2 MG/DL (ref 0.2–1.3)
BILIRUB UR QL STRIP: NEGATIVE
BLD GP AB SCN SERPL QL: NORMAL
BLD PROD TYP BPU: NORMAL
BLOOD BANK CMNT PATIENT-IMP: NORMAL
BUN SERPL-MCNC: 25 MG/DL (ref 7–30)
CALCIUM SERPL-MCNC: 8.5 MG/DL (ref 8.5–10.1)
CHLORIDE SERPL-SCNC: 110 MMOL/L (ref 94–109)
CO2 SERPL-SCNC: 23 MMOL/L (ref 20–32)
COLOR UR AUTO: YELLOW
CREAT SERPL-MCNC: 2.13 MG/DL (ref 0.66–1.25)
ERYTHROCYTE [DISTWIDTH] IN BLOOD BY AUTOMATED COUNT: 17.1 % (ref 10–15)
FERRITIN SERPL-MCNC: 17 NG/ML (ref 26–388)
FOLATE SERPL-MCNC: 5.9 NG/ML
GFR SERPL CREATININE-BSD FRML MDRD: 32 ML/MIN/1.7M2
GLUCOSE SERPL-MCNC: 96 MG/DL (ref 70–99)
GLUCOSE UR STRIP-MCNC: NEGATIVE MG/DL
HCT VFR BLD AUTO: 24.8 % (ref 40–53)
HGB BLD-MCNC: 7.3 G/DL (ref 13.3–17.7)
HGB UR QL STRIP: ABNORMAL
IRON SERPL-MCNC: 21 UG/DL (ref 35–180)
KETONES UR STRIP-MCNC: NEGATIVE MG/DL
LDH SERPL L TO P-CCNC: 185 U/L (ref 85–227)
LEUKOCYTE ESTERASE UR QL STRIP: ABNORMAL
MCH RBC QN AUTO: 29.4 PG (ref 26.5–33)
MCHC RBC AUTO-ENTMCNC: 29.4 G/DL (ref 31.5–36.5)
MCV RBC AUTO: 100 FL (ref 78–100)
NITRATE UR QL: POSITIVE
NUM BPU REQUESTED: 2
PH UR STRIP: 6 PH (ref 5–7)
PLATELET # BLD AUTO: 157 10E9/L (ref 150–450)
POTASSIUM SERPL-SCNC: 4.1 MMOL/L (ref 3.4–5.3)
RBC # BLD AUTO: 2.48 10E12/L (ref 4.4–5.9)
RBC #/AREA URNS AUTO: ABNORMAL /HPF
RETICS # AUTO: 74.7 10E9/L (ref 25–95)
RETICS/RBC NFR AUTO: 3 % (ref 0.5–2)
SODIUM SERPL-SCNC: 142 MMOL/L (ref 133–144)
SOURCE: ABNORMAL
SP GR UR STRIP: 1.01 (ref 1–1.03)
SPECIMEN EXP DATE BLD: NORMAL
TSH SERPL DL<=0.005 MIU/L-ACNC: 0.94 MU/L (ref 0.4–4)
UROBILINOGEN UR STRIP-ACNC: 0.2 EU/DL (ref 0.2–1)
VIT B12 SERPL-MCNC: 463 PG/ML (ref 193–986)
WBC # BLD AUTO: 10.3 10E9/L (ref 4–11)
WBC #/AREA URNS AUTO: >100 /HPF

## 2018-12-05 PROCEDURE — 86850 RBC ANTIBODY SCREEN: CPT | Performed by: INTERNAL MEDICINE

## 2018-12-05 PROCEDURE — 86922 COMPATIBILITY TEST ANTIGLOB: CPT | Performed by: INTERNAL MEDICINE

## 2018-12-05 PROCEDURE — 87088 URINE BACTERIA CULTURE: CPT | Performed by: INTERNAL MEDICINE

## 2018-12-05 PROCEDURE — 87086 URINE CULTURE/COLONY COUNT: CPT | Performed by: INTERNAL MEDICINE

## 2018-12-05 PROCEDURE — 81001 URINALYSIS AUTO W/SCOPE: CPT | Performed by: INTERNAL MEDICINE

## 2018-12-05 PROCEDURE — 85027 COMPLETE CBC AUTOMATED: CPT | Performed by: INTERNAL MEDICINE

## 2018-12-05 PROCEDURE — 99215 OFFICE O/P EST HI 40 MIN: CPT | Performed by: INTERNAL MEDICINE

## 2018-12-05 PROCEDURE — 85045 AUTOMATED RETICULOCYTE COUNT: CPT | Performed by: INTERNAL MEDICINE

## 2018-12-05 PROCEDURE — 83540 ASSAY OF IRON: CPT | Performed by: INTERNAL MEDICINE

## 2018-12-05 PROCEDURE — 82746 ASSAY OF FOLIC ACID SERUM: CPT | Performed by: NURSE PRACTITIONER

## 2018-12-05 PROCEDURE — 82607 VITAMIN B-12: CPT | Performed by: INTERNAL MEDICINE

## 2018-12-05 PROCEDURE — 86900 BLOOD TYPING SEROLOGIC ABO: CPT | Performed by: INTERNAL MEDICINE

## 2018-12-05 PROCEDURE — 83010 ASSAY OF HAPTOGLOBIN QUANT: CPT | Performed by: INTERNAL MEDICINE

## 2018-12-05 PROCEDURE — 82728 ASSAY OF FERRITIN: CPT | Performed by: INTERNAL MEDICINE

## 2018-12-05 PROCEDURE — 87186 SC STD MICRODIL/AGAR DIL: CPT | Performed by: INTERNAL MEDICINE

## 2018-12-05 PROCEDURE — 36415 COLL VENOUS BLD VENIPUNCTURE: CPT | Performed by: INTERNAL MEDICINE

## 2018-12-05 PROCEDURE — 83615 LACTATE (LD) (LDH) ENZYME: CPT | Performed by: INTERNAL MEDICINE

## 2018-12-05 PROCEDURE — 84443 ASSAY THYROID STIM HORMONE: CPT | Performed by: INTERNAL MEDICINE

## 2018-12-05 PROCEDURE — 80048 BASIC METABOLIC PNL TOTAL CA: CPT | Performed by: INTERNAL MEDICINE

## 2018-12-05 PROCEDURE — 86901 BLOOD TYPING SEROLOGIC RH(D): CPT | Performed by: INTERNAL MEDICINE

## 2018-12-05 PROCEDURE — 82248 BILIRUBIN DIRECT: CPT | Performed by: INTERNAL MEDICINE

## 2018-12-05 PROCEDURE — 82247 BILIRUBIN TOTAL: CPT | Performed by: INTERNAL MEDICINE

## 2018-12-05 RX ORDER — DESONIDE 0.5 MG/ML
LOTION TOPICAL PRN
Qty: 118 ML | Refills: 1 | Status: SHIPPED | OUTPATIENT
Start: 2018-12-05 | End: 2020-02-03

## 2018-12-05 RX ORDER — SULFAMETHOXAZOLE/TRIMETHOPRIM 800-160 MG
1 TABLET ORAL 2 TIMES DAILY
Qty: 14 TABLET | Refills: 0 | Status: SHIPPED | OUTPATIENT
Start: 2018-12-05 | End: 2018-12-20

## 2018-12-05 RX ORDER — PANTOPRAZOLE SODIUM 40 MG/1
TABLET, DELAYED RELEASE ORAL
Qty: 30 TABLET | Refills: 11 | Status: SHIPPED | OUTPATIENT
Start: 2018-12-05 | End: 2019-05-31

## 2018-12-05 NOTE — MR AVS SNAPSHOT
After Visit Summary   12/5/2018    Abhijeet Mccauley    MRN: [id number]           Patient Information     Date Of Birth          1960        Visit Information        Provider Department      12/5/2018 11:20 AM Chidi Valenzuela MD [facility]        Today's Diagnoses     Dark urine    -  1    Normocytic anemia        Chronic kidney disease, stage III (moderate) (H)        Nonspecific finding on examination of urine        Acute cystitis with hematuria          Care Instructions    Get the transfusion tomorrow and have your hemoglobin level checked in one week.  If that is improved, we'll proceed with stress test.   Follow-up in clinic after the stress test is done for another pre-op appointment.    Start the Bactrim for ongoing urine infection.  Let us know if symptoms don't resolve.          Follow-ups after your visit        Follow-up notes from your care team     Return in about 5 days (around 12/10/2018) for Lab Work.      Your next 10 appointments already scheduled     Dec 12, 2018   Procedure with Morgan Pan MD   [facility] (--)    [address] [id number] [phone]           The medical center is located at [address] (between [address] in [place], [place] north of [place]).            Feb 13, 2019  2:30 PM CST   Return Visit with Landen Quiñones MD   [facility] ([facility] [place])    [address]  [address]  [facility] [place] [facility] [place] [id number] [phone]              Future tests that were ordered for you today     Open Future Orders        Priority Expected Expires Ordered    Hemoglobin Routine  12/5/2019 12/5/2018            Who to contact     If you have questions or need follow up information about today's clinic visit or your schedule please contact [facility] directly at [phone].  Normal or non-critical lab and imaging  results will be communicated to you by MyChart, letter or phone within 4 business days after the clinic has received the results. If you do not hear from us within 7 days, please contact the clinic through MyChart or phone. If you have a critical or abnormal lab result, we will notify you by phone as soon as possible.  Submit refill requests through [employer] or call your pharmacy and they will forward the refill request to us. Please allow 3 business days for your refill to be completed.          Additional Information About Your Visit        Care EveryWhere ID     This is your Care EveryWhere ID. This could be used by other organizations to access your [place] medical records  [id number]        Your Vitals Were     Pulse Temperature Respirations Pulse Oximetry BMI (Body Mass Index)       76 97.5  F (36.4  C) (Tympanic) 14 96% 32.42 kg/m2        Blood Pressure from Last 3 Encounters:   12/05/18 126/64   11/20/18 124/60   11/08/18 137/74    Weight from Last 3 Encounters:   12/05/18 194 lb 12.8 oz (88.4 kg)   11/20/18 188 lb (85.3 kg)   08/02/18 190 lb (86.2 kg)              We Performed the Following     *UA reflex to Microscopic and Culture ([place] and [facility] (except [place] and [place])     ABO/Rh type and screen     Basic metabolic panel     Bilirubin Direct and Total     CBC with platelets     Ferritin     Folate     Haptoglobin     Iron     Lactate Dehydrogenase     Reticulocyte count     TSH     Urine Culture Aerobic Bacterial     Urine Microscopic     Vitamin B12          Today's Medication Changes          These changes are accurate as of 12/5/18  1:29 PM.  If you have any questions, ask your nurse or doctor.               Start taking these medicines.        Dose/Directions    sulfamethoxazole-trimethoprim 800-160 MG tablet   Commonly known as:  BACTRIM DS/SEPTRA DS   Used for:  Acute cystitis with hematuria   Started by:  Chidi Valenzuela MD        Dose:  1 tablet   Take 1 tablet by mouth 2  times daily   Quantity:  14 tablet   Refills:  0            Where to get your medicines      These medications were sent to [facility] [place], [place] - [address]  [address] [address]     Phone:  [phone]     sulfamethoxazole-trimethoprim 800-160 MG tablet                Primary Care Provider Office Phone # Fax #    Parrish Funk [phone] [address] [address]        Equal Access to Services     JORGE COOL : Hadii aad ku hadasho Soomaali, waaxda luqadaha, qaybta kaalmada adeegyada, waxay idiin hayaan adedelroy chowdhury . So [facility] [phone].    ATENCIÓN: Si habla español, tiene a uriostegui disposición servicios gratuitos de asistencia lingüística. [facility] [phone].    We comply with applicable federal civil rights laws and [place] laws. We do not discriminate on the basis of race, color, national origin, age, disability, sex, sexual orientation, or gender identity.            Thank you!     Thank you for choosing [facility]  for your care. Our goal is always to provide you with excellent care. Hearing back from our patients is one way we can continue to improve our services. Please take a few minutes to complete the written survey that you may receive in the mail after your visit with us. Thank you!             Your Updated Medication List - Protect others around you: Learn how to safely use, store and throw away your medicines at www.disposemymeds.org.          This list is accurate as of 12/5/18  1:29 PM.  Always use your most recent med list.                   Brand Name Dispense Instructions for use Diagnosis    acetaminophen 500 MG tablet    TYLENOL     Take 1-2 tablets by mouth every 6 hours as needed.        albuterol 108 (90 Base) MCG/ACT inhaler    PROAIR HFA/PROVENTIL HFA/VENTOLIN HFA    1 Inhaler    Inhale 2 puffs into the lungs every 4 hours as needed    Mild intermittent asthma without  complication       baclofen 10 MG tablet    LIORESAL    90 tablet    Take 1/2 to one tablet up to three times a day    Muscle spasm       desonide 0.05 % external lotion    DESOWEN    118 mL    Apply topically as needed    Rosacea       furosemide 20 MG tablet    LASIX    30 tablet    Take 1 tablet (20 mg) by mouth every morning    Osteoarthritis of spine without myelopathy or radiculopathy, sacral and sacrococcygeal region       loratadine 10 MG tablet    CLARITIN    30 tablet    Take 1 tablet by mouth daily as needed for allergies.    Allergic rhinitis       Melatonin ER 5 MG Tbcr     30 tablet    Take 1 tablet by mouth At Bedtime    Gastroesophageal reflux disease without esophagitis       mometasone-formoterol 200-5 MCG/ACT inhaler    DULERA     Inhale 2 puffs into the lungs 2 times daily        nitroFURantoin macrocrystal-monohydrate 100 MG capsule    MACROBID    14 capsule    Take 1 capsule (100 mg) by mouth 2 times daily    Dysuria       order for DME     1 Device    Equipment being ordered: shower chair    Acute myeloid leukemia in remission (H)       * PANTOPRAZOLE SODIUM PO      Take 40 mg by mouth every morning (before breakfast)        * pantoprazole 40 MG EC tablet    PROTONIX    30 tablet    TAKE ONE TABLET BY MOUTH EVERY MORNING BEFORE BREAKFAST    Gastroesophageal reflux disease without esophagitis       potassium chloride ER 10 MEQ CR tablet    K-DUR/KLOR-CON M    90 tablet    Take 1 tablet (10 mEq) by mouth daily    Gastroesophageal reflux disease without esophagitis       propranolol 20 MG tablet    INDERAL    90 tablet    Take 1 tablet (20 mg) by mouth 2 times daily    Gastroesophageal reflux disease without esophagitis       SODIUM BICARBONATE PO      Take 650 mg by mouth daily as needed        sulfamethoxazole-trimethoprim 800-160 MG tablet    BACTRIM DS/SEPTRA DS    14 tablet    Take 1 tablet by mouth 2 times daily    Acute cystitis with hematuria       tiotropium 18 MCG inhaled capsule     SPIRIVA     Inhale 1 capsule into the lungs daily Using PRN        traZODone 50 MG tablet    DESYREL    90 tablet    Take 1 tablet (50 mg) by mouth At Bedtime    Osteoarthritis of spine without myelopathy or radiculopathy, sacral and sacrococcygeal region       * Notice:  This list has 2 medication(s) that are the same as other medications prescribed for you. Read the directions carefully, and ask your doctor or other care provider to review them with you.

## 2018-12-05 NOTE — Clinical Note
GALILEO ly Abhijeet is not cleared for surgery and surgery will need to be rescheduled pending further work-up.  Thanks.

## 2018-12-05 NOTE — PATIENT INSTRUCTIONS
Get the transfusion tomorrow and have your hemoglobin level checked in one week.  If that is improved, we'll proceed with stress test.   Follow-up in clinic after the stress test is done for another pre-op appointment.    Start the Bactrim for ongoing urine infection.  Let us know if symptoms don't resolve.

## 2018-12-05 NOTE — PROGRESS NOTES
SUBJECTIVE:   Abhijeet Mccauley is a 57 year old male who presents to clinic today for the following health issues:    Chief Complaint   Patient presents with     RECHECK     follow up labs,        Abhijeet was seen for a preop for a knee replacement on 11/20 and several concerns were noted at that time for which he is following up.    One is that he was started on nitrofurantoin for UTI.  Culture grew E cloacae that ws sensitive to the nitrofurantoin.  However, he reports ongoing dark color to the urine, almost reddish, and this seems to actually be worsening.  No dysuria, frequency.  Has chronic urgency, no worse recently.      He was noted to be anemic with a Hgb of 8.3.  He reports iron pills and vitamins make him sick to his stomach.  He got very ill from IV iron as well.  Further lab work was ordered to investigate this, but not yet done.     He is scheduled for a stress test today because he had T wave inversion on EKG during his preop.  He does endorse SOB and chest pain with exertion and being outside in the cold.  He is dizzy with position changes.      ACT SCORE 11    Problem list and histories reviewed & adjusted, as indicated.  Additional history: as documented    Patient Active Problem List   Diagnosis     Essential hypertension     Acute myeloid leukemia in remission (H)     Sensorineural hearing loss, asymmetrical     Alcoholic cirrhosis of liver (H)     Acute alcoholic hepatitis     Coagulation defect (H)     Osteoarthrosis     Thrombocytopenia (H)     Universal ulcerative (chronic) colitis(556.6) (H)     CKD (chronic kidney disease) stage 3, GFR 30-59 ml/min (H)     Rosacea     Mild intermittent asthma without complication     Peptic ulcer disease     Uncomplicated alcohol dependence (H)     Tobacco dependence syndrome     Tubular adenoma of colon     Normocytic anemia     Leukocytosis with increased monocytes     Generalized weakness     Past Surgical History:   Procedure Laterality Date     AS  TOTAL KNEE ARTHROPLASTY Left      BONE MARROW BIOPSY, BONE SPECIMEN, NEEDLE/TROCAR N/A 6/12/2018    Procedure: BIOPSY BONE MARROW;  Bone Marrow Biopsy;  Surgeon: Demar Sapp MD;  Location: [facility]     ESOPHAGOSCOPY, GASTROSCOPY, DUODENOSCOPY (EGD), COMBINED N/A 2/8/2018    Procedure: COMBINED ESOPHAGOSCOPY, GASTROSCOPY, DUODENOSCOPY (EGD), BIOPSY SINGLE OR MULTIPLE;  gastroscopy with biopsies;  Surgeon: Raz Cobb MD;  Location: [facility]     ESOPHAGOSCOPY, GASTROSCOPY, DUODENOSCOPY (EGD), COMBINED N/A 3/18/2018    Procedure: COMBINED ESOPHAGOSCOPY, GASTROSCOPY, DUODENOSCOPY (EGD);;  Surgeon: Raz Cobb MD;  Location: [facility]     LACERATION REPAIR Right     Right leg       Social History   Substance Use Topics     Smoking status: Current Every Day Smoker     Packs/day: 0.50     Years: 30.00     Types: Cigarettes     Smokeless tobacco: Never Used      Comment: I strongly emphasized the importance of quitting smoking. 4-5 daily     Alcohol use Yes      Comment: Down to 3 beers per day.  Sometimes a cocktail also.     Family History   Problem Relation Age of Onset     Hypertension Mother      Coronary Artery Disease Father      MI         Current Outpatient Prescriptions   Medication Sig Dispense Refill     furosemide (LASIX) 20 MG tablet Take 1 tablet (20 mg) by mouth every morning 30 tablet 11     mometasone-formoterol (DULERA) 200-5 MCG/ACT oral inhaler Inhale 2 puffs into the lungs 2 times daily        potassium chloride SA (K-DUR/KLOR-CON M) 10 MEQ CR tablet Take 1 tablet (10 mEq) by mouth daily 90 tablet 3     propranolol (INDERAL) 20 MG tablet Take 1 tablet (20 mg) by mouth 2 times daily 90 tablet 3     sulfamethoxazole-trimethoprim (BACTRIM DS/SEPTRA DS) 800-160 MG tablet Take 1 tablet by mouth 2 times daily 14 tablet 0     traZODone (DESYREL) 50 MG tablet Take 1 tablet (50 mg) by mouth At Bedtime 90 tablet 3     acetaminophen (TYLENOL) 500 MG tablet Take 1-2 tablets by mouth every 6 hours as  "needed.       albuterol (PROAIR HFA/PROVENTIL HFA/VENTOLIN HFA) 108 (90 BASE) MCG/ACT Inhaler Inhale 2 puffs into the lungs every 4 hours as needed 1 Inhaler 11     baclofen (LIORESAL) 10 MG tablet Take 1/2 to one tablet up to three times a day (Patient not taking: Reported on 12/5/2018) 90 tablet 1     desonide (DESOWEN) 0.05 % external lotion Apply topically as needed 118 mL 1     loratadine (CLARITIN) 10 MG tablet Take 1 tablet by mouth daily as needed for allergies. 30 tablet 6     Melatonin ER 5 MG TBCR Take 1 tablet by mouth At Bedtime 30 tablet 11     nitroFURantoin, macrocrystal-monohydrate, (MACROBID) 100 MG capsule Take 1 capsule (100 mg) by mouth 2 times daily 14 capsule 0     order for DME Equipment being ordered: shower chair 1 Device 0     pantoprazole (PROTONIX) 40 MG EC tablet TAKE ONE TABLET BY MOUTH EVERY MORNING BEFORE BREAKFAST 30 tablet 11     PANTOPRAZOLE SODIUM PO Take 40 mg by mouth every morning (before breakfast)       SODIUM BICARBONATE PO Take 650 mg by mouth daily as needed        tiotropium (SPIRIVA) 18 MCG capsule Inhale 1 capsule into the lungs daily Using PRN       Allergies   Allergen Reactions     Blood Transfusion Related (Informational Only)      Patient has a history of a clinically significant antibody against RBC antigens.  A delay in compatible RBCs may occur.     Famotidine Unknown and Other (See Comments)     Severe abdominal cramps     Cyclobenzaprine      hives     Gabapentin      Made pt's \"face break out\"     Methocarbamol      Made pt's \"face break out\"     Pepcid Cramps     Severe abdominal cramps     Vancomycin      Other reaction(s): Other  hallucinations     Vfend      IV     Hydrocodone-Acetaminophen Rash       Reviewed and updated as needed this visit by clinical staff  Tobacco  Allergies  Meds  Med Hx  Surg Hx  Fam Hx  Soc Hx      Reviewed and updated as needed this visit by Provider         ROS:  Constitutional, cardiovascular, pulmonary, and gu systems " are negative, except as otherwise noted.    OBJECTIVE:     /64 (BP Location: Right arm, Patient Position: Chair, Cuff Size: Adult Regular)  Pulse 76  Temp 97.5  F (36.4  C) (Tympanic)  Resp 14  Wt 194 lb 12.8 oz (88.4 kg)  SpO2 96%  BMI 32.42 kg/m2  Body mass index is 32.42 kg/(m^2).    GENERAL: , alert and no distress  RESP: lungs clear to auscultation - no rales, rhonchi or wheezes  CV: regular rate and rhythm, normal S1 S2, no S3 or S4, no murmur, click or rub  ABDOMEN: soft, not tender over the bladder, no hepatosplenomegaly, no masses and bowel sounds normal        Diagnostic Test Results:  Results for orders placed or performed in visit on 12/05/18 (from the past 24 hour(s))   *UA reflex to Microscopic and Culture ([place] and [facility] (except [place] and [place])   Result Value Ref Range    Color Urine Yellow     Appearance Urine Cloudy     Glucose Urine Negative NEG^Negative mg/dL    Bilirubin Urine Negative NEG^Negative    Ketones Urine Negative NEG^Negative mg/dL    Specific Gravity Urine 1.015 1.003 - 1.035    Blood Urine Large (A) NEG^Negative    pH Urine 6.0 5.0 - 7.0 pH    Protein Albumin Urine Trace (A) NEG^Negative mg/dL    Urobilinogen Urine 0.2 0.2 - 1.0 EU/dL    Nitrite Urine Positive (A) NEG^Negative    Leukocyte Esterase Urine Large (A) NEG^Negative    Source Midstream Urine    Urine Microscopic   Result Value Ref Range    WBC Urine >100 (A) OTO5^0 - 5 /HPF    RBC Urine 5-10 (A) OTO2^O - 2 /HPF    Bacteria Urine Many (A) NEG^Negative /HPF   Lactate Dehydrogenase   Result Value Ref Range    Lactate Dehydrogenase 185 85 - 227 U/L   Bilirubin Direct and Total   Result Value Ref Range    Bilirubin Direct 0.8 (H) 0.0 - 0.2 mg/dL    Bilirubin Total 1.2 0.2 - 1.3 mg/dL   Ferritin   Result Value Ref Range    Ferritin 17 (L) 26 - 388 ng/mL   Iron   Result Value Ref Range    Iron 21 (L) 35 - 180 ug/dL   Reticulocyte count   Result Value Ref Range    % Retic 3.0 (H) 0.5 - 2.0 %     Absolute Retic 74.7 25 - 95 10e9/L   TSH   Result Value Ref Range    TSH 0.94 0.40 - 4.00 mU/L   Basic metabolic panel   Result Value Ref Range    Sodium 142 133 - 144 mmol/L    Potassium 4.1 3.4 - 5.3 mmol/L    Chloride 110 (H) 94 - 109 mmol/L    Carbon Dioxide 23 20 - 32 mmol/L    Anion Gap 9 3 - 14 mmol/L    Glucose 96 70 - 99 mg/dL    Urea Nitrogen 25 7 - 30 mg/dL    Creatinine 2.13 (H) 0.66 - 1.25 mg/dL    GFR Estimate 32 (L) >60 mL/min/1.7m2    GFR Estimate If Black 39 (L) >60 mL/min/1.7m2    Calcium 8.5 8.5 - 10.1 mg/dL   CBC with platelets   Result Value Ref Range    WBC 10.3 4.0 - 11.0 10e9/L    RBC Count 2.48 (L) 4.4 - 5.9 10e12/L    Hemoglobin 7.3 (L) 13.3 - 17.7 g/dL    Hematocrit 24.8 (L) 40.0 - 53.0 %     78 - 100 fl    MCH 29.4 26.5 - 33.0 pg    MCHC 29.4 (L) 31.5 - 36.5 g/dL    RDW 17.1 (H) 10.0 - 15.0 %    Platelet Count 157 150 - 450 10e9/L       ASSESSMENT/PLAN:         1. Normocytic anemia  6. Chest pain, unspecified type    Abhijeet was noted to have worsened anemia at his preop visit, so follow-up labs were ordered but had not been done prior to today.  I did release these orders to be done as well as added on some labs for hemolysis given that he was describing some dark urine.  I did also recheck the hemoglobin level today and this was initially round in the clinic lab with a hemoglobin level of 7.2 so they sent it upstairs to the hospital lab were was run again with a hemoglobin level of 7.3.  His anemia is likely multifactorial.  He does have some ongoing iron deficiency, unfortunately he has been intolerant of both oral and IV iron in the past.  He does have CKD, which is also likely contributing some as well.  His indirect bilirubin and LDH are normal, so it does not appear that he is having significant hemolysis, so the dark urine may be due to hematuria, which may be contributing to his anemia as well.  Haptoglobin levels in process as are folate and B12 levels.  He was scheduled to  have a stress test today due to inverted T waves noted on his preop EKG, but due to the further drop in his hemoglobin, I recommended deferring the stress test until he can get this back up a bit.  I did recommend blood transfusion even though hemoglobin is above 7 because he is having chest pain or shortness of breath and to improve his hemoglobin more quickly given his intolerance to other therapies for his anemia.  He is scheduled to have a 2 unit PRBC transfusion in 2 days.  We will recheck his hemoglobin a few days prior to the rescheduled stress test which is now on December 19.  If hemoglobin level is above 8, can proceed with stress test.  He will need to reschedule his surgery as this will not be able to be completed before surgery.  Plan to follow-up after a stress test for another preoperative exam.      - Ferritin  - Iron  - Reticulocyte count  - TSH  - Vitamin B12  - Folate  - Basic metabolic panel  - CBC with platelets  - Hemoglobin; Future  - ABO/Rh type and screen    2. Dark urine    - Lactate Dehydrogenase  - Haptoglobin  - Bilirubin Direct and Total  - *UA reflex to Microscopic and Culture ([place] and [facility] (except [place] and [place])  - Urine Microscopic    3. Nonspecific finding on examination of urine    - Urine Culture Aerobic Bacterial    4. Acute cystitis with hematuria    UA is indicative of ongoing infection even though he was treated with nitrofurantoin which his prior urine culture growth of E. cloacae should have been sensitive to.  We will treat with Bactrim.  Follow-up as needed if not improving in a week or new/worsening symptoms develop.       - sulfamethoxazole-trimethoprim (BACTRIM DS/SEPTRA DS) 800-160 MG tablet; Take 1 tablet by mouth 2 times daily  Dispense: 14 tablet; Refill: 0    5. Chronic kidney disease, stage III (moderate) (H)    - Basic metabolic panel      More than 50% of this 40 minute face-to-face appointment was spent on counseling and coordination of  care of the above.      Chidi Valenzuela MD  [facility]

## 2018-12-06 LAB — HAPTOGLOB SERPL-MCNC: 79 MG/DL (ref 15–200)

## 2018-12-07 ENCOUNTER — INFUSION THERAPY VISIT (OUTPATIENT)
Dept: INFUSION THERAPY | Facility: CLINIC | Age: 58
End: 2018-12-07
Attending: INTERNAL MEDICINE
Payer: COMMERCIAL

## 2018-12-07 ENCOUNTER — TELEPHONE (OUTPATIENT)
Dept: FAMILY MEDICINE | Facility: CLINIC | Age: 58
End: 2018-12-07

## 2018-12-07 VITALS
SYSTOLIC BLOOD PRESSURE: 133 MMHG | TEMPERATURE: 97.6 F | RESPIRATION RATE: 16 BRPM | DIASTOLIC BLOOD PRESSURE: 62 MMHG | HEART RATE: 73 BPM

## 2018-12-07 DIAGNOSIS — D64.9 NORMOCYTIC ANEMIA: Primary | ICD-10-CM

## 2018-12-07 DIAGNOSIS — D50.9 IRON DEFICIENCY ANEMIA, UNSPECIFIED IRON DEFICIENCY ANEMIA TYPE: Primary | ICD-10-CM

## 2018-12-07 LAB
BLD PROD TYP BPU: NORMAL
BLD PROD TYP BPU: NORMAL
BLD UNIT ID BPU: 0
BLD UNIT ID BPU: 0
BLOOD PRODUCT CODE: NORMAL
BLOOD PRODUCT CODE: NORMAL
BPU ID: NORMAL
BPU ID: NORMAL
TRANSFUSION STATUS PATIENT QL: NORMAL

## 2018-12-07 PROCEDURE — P9016 RBC LEUKOCYTES REDUCED: HCPCS

## 2018-12-07 PROCEDURE — 36430 TRANSFUSION BLD/BLD COMPNT: CPT

## 2018-12-07 RX ORDER — CELECOXIB 200 MG/1
400 CAPSULE ORAL ONCE
Status: CANCELLED | OUTPATIENT
Start: 2018-12-07 | End: 2018-12-07

## 2018-12-07 RX ORDER — CEFAZOLIN SODIUM 1 G/3ML
1 INJECTION, POWDER, FOR SOLUTION INTRAMUSCULAR; INTRAVENOUS SEE ADMIN INSTRUCTIONS
Status: CANCELLED | OUTPATIENT
Start: 2018-12-07

## 2018-12-07 RX ORDER — CEFAZOLIN SODIUM 2 G/100ML
2 INJECTION, SOLUTION INTRAVENOUS
Status: CANCELLED | OUTPATIENT
Start: 2018-12-07

## 2018-12-07 ASSESSMENT — ASTHMA QUESTIONNAIRES: ACT_TOTALSCORE: 11

## 2018-12-07 NOTE — TELEPHONE ENCOUNTER
Patient notified of Provider dictation below and Patient verbalized understanding. Mariya Iglesias RN

## 2018-12-07 NOTE — TELEPHONE ENCOUNTER
Patient would like referral for hematology per recent results:     Notes Recorded by Breana Lopes APRN CNP on 12/5/2018 at 3:41 PM  pts iron is low- he needs to follow up with Hematology as he previously saw them for his anemia before he can have surgery - likely he needs iron infusion    Notes Recorded by Gilma Ramos MA on 12/5/2018 at 3:59 PM  Called and notified patient of results and provider's message.     He would like to get a referral to see a different hematologist, as he did not like the one he saw before.     He is concerned because if he needs appointments he has to give a 2-day notice to get a ride.  Gilma Ramos CMA..........12/5/2018 3:58 PM

## 2018-12-07 NOTE — TELEPHONE ENCOUNTER
Referral placed.  Since he is receiving blood transfusion today, he doesn't necessarily need to see them before surgery, but I would recommend still seeing them at some point soon to discuss his ongoing anemia issue.    I also note that it looks like his surgery is still scheduled.  He should contact Dr. Pan's office to let them know that surgery needs to be delayed.  Thanks.    Chidi Valenzuela MD

## 2018-12-07 NOTE — PROGRESS NOTES
Infusion Nursing Note:  Abhijeet Mccauley presents today for 2 units PRBC's.    Patient seen by provider today: No   present during visit today: Not Applicable.    Note: N/A.    Intravenous Access:  Peripheral IV placed.    Treatment Conditions:  Blood transfusion consent signed and sent to [facility].      Post Infusion Assessment:  Patient tolerated transfusion without incident.  Blood return noted pre and post infusion.  Site patent and intact, free from redness, edema or discomfort.  No evidence of extravasations.  Access discontinued per protocol.    Discharge Plan:   Patient discharged in stable condition accompanied by: self.  Departure Mode: Ambulatory.    Summer Godinez RN

## 2018-12-07 NOTE — MR AVS SNAPSHOT
After Visit Summary   12/7/2018    Abhijeet Mccauley    MRN: [id number]           Patient Information     Date Of Birth          1960        Visit Information        Provider Department      12/7/2018 10:00 AM ROOM 4 [facility]        Today's Diagnoses     Normocytic anemia    -  1       Follow-ups after your visit        Your next 10 appointments already scheduled     Dec 12, 2018   Procedure with Morgan Pan MD   [facility] (--)    [address] [address]   [phone]           The medical center is located at [address]. (between [address] in [place], [place] north of [place]).            Dec 14, 2018  1:10 PM CST   LAB with [place] LAB   [facility] ([facility])    [address] [place] [id number]   [phone]           Please do not eat 10-12 hours before your appointment if you are coming in fasting for labs on lipids, cholesterol, or glucose (sugar). This does not apply to pregnant women. Water, hot tea and black coffee (with nothing added) are okay. Do not drink other fluids, diet soda or chew gum.            Dec 19, 2018  1:00 PM CST   NM MPI WITH LEXISCAN with [place]NM2, [place] STRESS TEST 2   [facility] ([facility])    [address] [place] [address]   [phone]           How do I prepare for my exam? (Food and drink instructions) Day 1 & Day 2: Stop all caffeine 12 hours before the test. This includes coffee, tea, soda pop, chocolate and certain medicines (such as Anacin, Excedrin and NoDoz). Also avoid decaf coffee and tea, as these contain small amounts of caffeine. Stop eating 4 hours before the test. You may drink water.  How do I prepare for my exam? (Other instructions) You may need to stop some medicines before the test. Follow your doctor s orders. Day 1 & Day 2: *If you take a beta blocker: Do not take your  beta-blocker on the day before your test, unless specifically told to by your doctor. And do not take it on the day of your test. Bring it with you to take after the test.  *If you take Aggrenox or dipyridamole (Persantine, Permole), stop taking it 48 hours before your test. *If you take Viagra, Cialis or Levitra, stop taking it 48 hours before your test. *If you take theophylline or aminophylline, stop taking it 12 hours before your test.  For patients with diabetes: *If you take insulin, call your diabetes care team. Ask if you should take a 1/2 dose the morning of your test. *If you take diabetes medicine by mouth, don t take it on the morning of your test. Bring it with you to take after the test. (If you have questions, call your diabetes care team.)  *Do not take nitrates on the day of your test. Do not wear your Nitro-Patch. *No alcohol, smoking or other tobacco for 12 hours before the test.  What should I wear: Please wear a loose two-piece outfit. If you will have an exercise test, bring rubber-soled walking shoes.  How long does the exam take: *This test can take 1-2 days.* ONE day exam: Allow 3-4 hours for test. IF TWO day exam: Allow  minutes PER day for test.  What should I bring: Please bring a list of your medicines (including vitamins, minerals and over-the-counter drugs). Leave your valuables at home.  Do I need a :  No  is needed.  What do I need to tell my doctor? When you arrive, please tell us if you: * Have diabetes * Are breastfeeding * May be pregnant * Have a pacemaker of ICD (implantable defibrillator).  What should I do after the exam: No restrictions, You may resume normal activities.  What is this test: Your doctor has ordered a nuclear stress test to check how well blood is flowing through your heart. You will either exercise or take a medicine that mimics exercise; we will watch your heart.  Who should I call with questions: If you have any questions, please call the  Imaging Department where you will have your exam. Directions, parking instructions, and other information is available on our website, [email or web address].            Feb 13, 2019  2:30 PM CST   Return Visit with Landen Quiñones MD   [facility] [place] ([facility])    [address]  [address]  [facility] [address] [phone]              Who to contact     If you have questions or need follow up information about today's clinic visit or your schedule please contact [facility] directly at [phone].  Normal or non-critical lab and imaging results will be communicated to you by MyChart, letter or phone within 4 business days after the clinic has received the results. If you do not hear from us within 7 days, please contact the clinic through MyChart or phone. If you have a critical or abnormal lab result, we will notify you by phone as soon as possible.  Submit refill requests through [employer] or call your pharmacy and they will forward the refill request to us. Please allow 3 business days for your refill to be completed.          Additional Information About Your Visit        Care EveryWhere ID     This is your Care EveryWhere ID. This could be used by other organizations to access your [facility] medical records  [id number]        Your Vitals Were     Pulse Temperature Respirations             73 97.6  F (36.4  C) (Oral) 16          Blood Pressure from Last 3 Encounters:   12/07/18 133/62   12/05/18 126/64   11/20/18 124/60    Weight from Last 3 Encounters:   12/05/18 88.4 kg (194 lb 12.8 oz)   11/20/18 85.3 kg (188 lb)   08/02/18 86.2 kg (190 lb)              We Performed the Following     Transfuse red blood cell unit     Transfuse red blood cell unit        Primary Care Provider Office Phone # Fax #    Chidi Valenzuela [phone] [address] [facility] [address]        Equal Access to Services     JORGE  GAAR : Hadii aad ku hadhusam Fuentesali, waaxda luqadaha, qaybta kaalmada adeling, song manjeetin hayaamichael ivy mayuri luciana . So [facility] [phone].    ATENCIÓN: Si habla español, tiene a uriostegui disposición servicios gratuitos de asistencia lingüística. Llame al [phone].    We comply with applicable federal civil rights laws and [place] laws. We do not discriminate on the basis of race, color, national origin, age, disability, sex, sexual orientation, or gender identity.            Thank you!     Thank you for choosing [facility]  for your care. Our goal is always to provide you with excellent care. Hearing back from our patients is one way we can continue to improve our services. Please take a few minutes to complete the written survey that you may receive in the mail after your visit with us. Thank you!             Your Updated Medication List - Protect others around you: Learn how to safely use, store and throw away your medicines at www.disposemymeds.org.          This list is accurate as of 12/7/18  1:56 PM.  Always use your most recent med list.                   Brand Name Dispense Instructions for use Diagnosis    acetaminophen 500 MG tablet    TYLENOL     Take 1-2 tablets by mouth every 6 hours as needed.        albuterol 108 (90 Base) MCG/ACT inhaler    PROAIR HFA/PROVENTIL HFA/VENTOLIN HFA    1 Inhaler    Inhale 2 puffs into the lungs every 4 hours as needed    Mild intermittent asthma without complication       baclofen 10 MG tablet    LIORESAL    90 tablet    Take 1/2 to one tablet up to three times a day    Muscle spasm       desonide 0.05 % external lotion    DESOWEN    118 mL    Apply topically as needed    Rosacea       furosemide 20 MG tablet    LASIX    30 tablet    Take 1 tablet (20 mg) by mouth every morning    Osteoarthritis of spine without myelopathy or radiculopathy, sacral and sacrococcygeal region       loratadine 10 MG tablet    CLARITIN    30 tablet    Take 1 tablet by  mouth daily as needed for allergies.    Allergic rhinitis       Melatonin ER 5 MG Tbcr     30 tablet    Take 1 tablet by mouth At Bedtime    Gastroesophageal reflux disease without esophagitis       mometasone-formoterol 200-5 MCG/ACT inhaler    DULERA     Inhale 2 puffs into the lungs 2 times daily        nitroFURantoin macrocrystal-monohydrate 100 MG capsule    MACROBID    14 capsule    Take 1 capsule (100 mg) by mouth 2 times daily    Dysuria       order for DME     1 Device    Equipment being ordered: shower chair    Acute myeloid leukemia in remission (H)       * PANTOPRAZOLE SODIUM PO      Take 40 mg by mouth every morning (before breakfast)        * pantoprazole 40 MG EC tablet    PROTONIX    30 tablet    TAKE ONE TABLET BY MOUTH EVERY MORNING BEFORE BREAKFAST    Gastroesophageal reflux disease without esophagitis       potassium chloride ER 10 MEQ CR tablet    K-DUR/KLOR-CON M    90 tablet    Take 1 tablet (10 mEq) by mouth daily    Gastroesophageal reflux disease without esophagitis       propranolol 20 MG tablet    INDERAL    90 tablet    Take 1 tablet (20 mg) by mouth 2 times daily    Gastroesophageal reflux disease without esophagitis       SODIUM BICARBONATE PO      Take 650 mg by mouth daily as needed        sulfamethoxazole-trimethoprim 800-160 MG tablet    BACTRIM DS/SEPTRA DS    14 tablet    Take 1 tablet by mouth 2 times daily    Acute cystitis with hematuria       tiotropium 18 MCG inhaled capsule    SPIRIVA     Inhale 1 capsule into the lungs daily Using PRN        traZODone 50 MG tablet    DESYREL    90 tablet    Take 1 tablet (50 mg) by mouth At Bedtime    Osteoarthritis of spine without myelopathy or radiculopathy, sacral and sacrococcygeal region       * Notice:  This list has 2 medication(s) that are the same as other medications prescribed for you. Read the directions carefully, and ask your doctor or other care provider to review them with you.

## 2018-12-08 LAB
BACTERIA SPEC CULT: ABNORMAL
BACTERIA SPEC CULT: ABNORMAL
Lab: ABNORMAL
SPECIMEN SOURCE: ABNORMAL

## 2018-12-17 ENCOUNTER — TELEPHONE (OUTPATIENT)
Dept: FAMILY MEDICINE | Facility: CLINIC | Age: 58
End: 2018-12-17

## 2018-12-17 DIAGNOSIS — M25.562 CHRONIC PAIN OF LEFT KNEE: Primary | ICD-10-CM

## 2018-12-17 DIAGNOSIS — G89.29 CHRONIC PAIN OF LEFT KNEE: Primary | ICD-10-CM

## 2018-12-17 DIAGNOSIS — C92.01 ACUTE MYELOID LEUKEMIA IN REMISSION (H): ICD-10-CM

## 2018-12-17 NOTE — TELEPHONE ENCOUNTER
Reason for Call:  Other DME orders    Detailed comments: Shower chair and toilet seat riser.  Fax to [facility] at [phone].    Phone Number Eleanor can be reached at: Other phone number:  [phone]    Best Time: any    Can we leave a detailed message on this number? YES    Call taken on 12/17/2018 at 2:25 PM by Aye Lilly

## 2018-12-17 NOTE — TELEPHONE ENCOUNTER
Pended 2 DME's:  One for toilet seat riser and one for shower chair.   Provider please review and advise. Thank you.

## 2018-12-18 NOTE — TELEPHONE ENCOUNTER
Two DME's faxed to [facility], toilet seat riser and shower chair.

## 2018-12-20 ENCOUNTER — ANCILLARY PROCEDURE (OUTPATIENT)
Dept: GENERAL RADIOLOGY | Facility: CLINIC | Age: 58
End: 2018-12-20
Attending: INTERNAL MEDICINE
Payer: COMMERCIAL

## 2018-12-20 ENCOUNTER — TELEPHONE (OUTPATIENT)
Dept: FAMILY MEDICINE | Facility: CLINIC | Age: 58
End: 2018-12-20

## 2018-12-20 ENCOUNTER — OFFICE VISIT (OUTPATIENT)
Dept: AUDIOLOGY | Facility: CLINIC | Age: 58
End: 2018-12-20
Payer: COMMERCIAL

## 2018-12-20 ENCOUNTER — OFFICE VISIT (OUTPATIENT)
Dept: FAMILY MEDICINE | Facility: CLINIC | Age: 58
End: 2018-12-20
Payer: COMMERCIAL

## 2018-12-20 VITALS
OXYGEN SATURATION: 94 % | SYSTOLIC BLOOD PRESSURE: 122 MMHG | WEIGHT: 185.4 LBS | BODY MASS INDEX: 30.85 KG/M2 | DIASTOLIC BLOOD PRESSURE: 66 MMHG | TEMPERATURE: 98 F | HEART RATE: 82 BPM

## 2018-12-20 DIAGNOSIS — D62 ANEMIA DUE TO BLOOD LOSS, ACUTE: ICD-10-CM

## 2018-12-20 DIAGNOSIS — R06.02 SOB (SHORTNESS OF BREATH): ICD-10-CM

## 2018-12-20 DIAGNOSIS — K62.5 RECTAL BLEEDING: ICD-10-CM

## 2018-12-20 DIAGNOSIS — R07.9 CHEST PAIN, UNSPECIFIED TYPE: ICD-10-CM

## 2018-12-20 DIAGNOSIS — K70.30 ALCOHOLIC CIRRHOSIS OF LIVER WITHOUT ASCITES (H): ICD-10-CM

## 2018-12-20 DIAGNOSIS — H90.3 SENSORINEURAL HEARING LOSS, ASYMMETRICAL: Primary | ICD-10-CM

## 2018-12-20 DIAGNOSIS — J44.1 COPD EXACERBATION (H): Primary | ICD-10-CM

## 2018-12-20 LAB
ALBUMIN SERPL-MCNC: 3.4 G/DL (ref 3.4–5)
ALP SERPL-CCNC: 315 U/L (ref 40–150)
ALT SERPL W P-5'-P-CCNC: 33 U/L (ref 0–70)
ANION GAP SERPL CALCULATED.3IONS-SCNC: 10 MMOL/L (ref 3–14)
AST SERPL W P-5'-P-CCNC: 60 U/L (ref 0–45)
BASOPHILS # BLD AUTO: 0.1 10E9/L (ref 0–0.2)
BASOPHILS NFR BLD AUTO: 1 %
BILIRUB SERPL-MCNC: 1.9 MG/DL (ref 0.2–1.3)
BUN SERPL-MCNC: 17 MG/DL (ref 7–30)
CALCIUM SERPL-MCNC: 8.8 MG/DL (ref 8.5–10.1)
CHLORIDE SERPL-SCNC: 105 MMOL/L (ref 94–109)
CO2 SERPL-SCNC: 22 MMOL/L (ref 20–32)
CREAT SERPL-MCNC: 1.95 MG/DL (ref 0.66–1.25)
DIFFERENTIAL METHOD BLD: ABNORMAL
EOSINOPHIL # BLD AUTO: 0.4 10E9/L (ref 0–0.7)
EOSINOPHIL NFR BLD AUTO: 3 %
ERYTHROCYTE [DISTWIDTH] IN BLOOD BY AUTOMATED COUNT: 17.3 % (ref 10–15)
GFR SERPL CREATININE-BSD FRML MDRD: 36 ML/MIN/{1.73_M2}
GLUCOSE SERPL-MCNC: 91 MG/DL (ref 70–99)
HCT VFR BLD AUTO: 30.3 % (ref 40–53)
HGB BLD-MCNC: 9.3 G/DL (ref 13.3–17.7)
LYMPHOCYTES # BLD AUTO: 2.1 10E9/L (ref 0.8–5.3)
LYMPHOCYTES NFR BLD AUTO: 16 %
MCH RBC QN AUTO: 29.9 PG (ref 26.5–33)
MCHC RBC AUTO-ENTMCNC: 30.7 G/DL (ref 31.5–36.5)
MCV RBC AUTO: 97 FL (ref 78–100)
MONOCYTES # BLD AUTO: 2.4 10E9/L (ref 0–1.3)
MONOCYTES NFR BLD AUTO: 18 %
NEUTROPHILS # BLD AUTO: 8.3 10E9/L (ref 1.6–8.3)
NEUTROPHILS NFR BLD AUTO: 62 %
NT-PROBNP SERPL-MCNC: 480 PG/ML (ref 0–125)
PLATELET # BLD AUTO: 149 10E9/L (ref 150–450)
POTASSIUM SERPL-SCNC: 3.9 MMOL/L (ref 3.4–5.3)
PROCALCITONIN SERPL-MCNC: 0.36 NG/ML
PROT SERPL-MCNC: 7.8 G/DL (ref 6.8–8.8)
RBC # BLD AUTO: 3.11 10E12/L (ref 4.4–5.9)
SODIUM SERPL-SCNC: 137 MMOL/L (ref 133–144)
TROPONIN I SERPL-MCNC: <0.015 UG/L (ref 0–0.04)
WBC # BLD AUTO: 13.3 10E9/L (ref 4–11)

## 2018-12-20 PROCEDURE — 85025 COMPLETE CBC W/AUTO DIFF WBC: CPT | Performed by: INTERNAL MEDICINE

## 2018-12-20 PROCEDURE — 99215 OFFICE O/P EST HI 40 MIN: CPT | Performed by: INTERNAL MEDICINE

## 2018-12-20 PROCEDURE — 99207 ZZC NO CHARGE LOS: CPT | Performed by: AUDIOLOGIST

## 2018-12-20 PROCEDURE — 83880 ASSAY OF NATRIURETIC PEPTIDE: CPT | Performed by: INTERNAL MEDICINE

## 2018-12-20 PROCEDURE — 80053 COMPREHEN METABOLIC PANEL: CPT | Performed by: INTERNAL MEDICINE

## 2018-12-20 PROCEDURE — 71046 X-RAY EXAM CHEST 2 VIEWS: CPT | Mod: FY

## 2018-12-20 PROCEDURE — 36415 COLL VENOUS BLD VENIPUNCTURE: CPT | Performed by: INTERNAL MEDICINE

## 2018-12-20 PROCEDURE — 84484 ASSAY OF TROPONIN QUANT: CPT | Performed by: INTERNAL MEDICINE

## 2018-12-20 PROCEDURE — V5299 HEARING SERVICE: HCPCS | Performed by: AUDIOLOGIST

## 2018-12-20 PROCEDURE — 93000 ELECTROCARDIOGRAM COMPLETE: CPT | Performed by: INTERNAL MEDICINE

## 2018-12-20 PROCEDURE — 84145 PROCALCITONIN (PCT): CPT | Performed by: INTERNAL MEDICINE

## 2018-12-20 RX ORDER — DOXYCYCLINE HYCLATE 100 MG
100 TABLET ORAL 2 TIMES DAILY
Qty: 10 TABLET | Refills: 0 | Status: SHIPPED | OUTPATIENT
Start: 2018-12-20 | End: 2019-02-01

## 2018-12-20 RX ORDER — PREDNISONE 20 MG/1
20 TABLET ORAL DAILY
Qty: 5 TABLET | Refills: 0 | Status: SHIPPED | OUTPATIENT
Start: 2018-12-20 | End: 2019-02-01

## 2018-12-20 NOTE — TELEPHONE ENCOUNTER
Patient seen in clinic today, please call him with returning test result.    After visit, his pro calcitonin returned elevated.  The pro calcitonin is often a marker of bacterial infection- will start antibiotic for presumed respiratory source.       I recommended multiple times for patient to be seen in the ER, but declined.  Please reiterate that if his symptoms are not improving In the next 24 hours that he should be seen in the ER

## 2018-12-20 NOTE — TELEPHONE ENCOUNTER
Patient is called and told of the abx being sent in and the need to f/u in the ER if not improving in 24 hours. Catherine WANG RN

## 2018-12-20 NOTE — PROGRESS NOTES
SUBJECTIVE:   Abhijeet Mccauley is a 58 year old male who presents to clinic today for the following health issues:    Chief Complaint   Patient presents with     Abdominal Pain     Chest Pain     Rectal Problem     Has had blood in his stool ever since he had a blood transfusion 2 weeks ago, states his stools are black and having blood streaked tp     Cough     Since last Wed, coughing up white phlegm        Patient is new to me.  History of alcholic cirrhosis, AML in remission, asthma, CKD-3, hypertension, PUD, smoker, ulcerative colitis (?).  Patient has no details on this diagnosis..    He was diagnosed with AML in June of 2008 who underwent induction chemotherapy with 7 + 3 and a second cycle of induction of 5 + 2 with remission and high dose KELLIE-C x 4, completed in December 2008. His complications included a line associated DVT developing into a pulmonary embolus in December 2008 for which he was anticoagulated for six months. He was last seen in clinic on January 26, 2012 as he was considered essentially cured of disease.    He had follow-up bone marrow biopsy that was normal.    Looking through his chart, he has been  Hospitalized 3-4 times in 2018 due to acute on chronic anemia, thought due to GI bleed.  Multiples scopes w/out etiology of bleed.    History of CMV esophagitis, etoh hepatitis, esophageal varies, portal hypertension.    ENT Symptoms             Symptoms: cc Present Absent Comment   Fever/Chills  x  Chills    Fatigue  x     Muscle Aches   x    Eye Irritation   x    Sneezing  x     Nasal Edward/Drg   x    Sinus Pressure/Pain  x     Loss of smell   x    Dental pain   x    Sore Throat   x    Swollen Glands   x    Ear Pain/Fullness       Cough  x  Productive white phlegm    Wheeze  x     Chest Pain  x  Constant, left side chest pain since last Wed    Shortness of breath  x  Feels he needs to sit up to breath, laying flat causes shortness of breath. Sleeping in recliner   Rash   x    Other  x   Significantly decreased PO intake     Symptom duration: 8 days    Symptom severity: Severe    Treatments tried:  inhalers    Contacts:  no known      Rectal bleeding started after blood transfusion 2 weeks ago. On 12/7.  No follow-up hemoglobin since then.  He is supposed to have stress test because of the chest pain.  Needing inhaler more often 3-4 x day, normally does not use.  Inhaler somewhat helpful.    Feels wheezy with deep breathing.  No pleuritic chest pain with deep breathing.      ABDOMINAL   PAIN     Onset: 8 days     Description:   Character: Dull ache, more sharp when he coughs   Location: epigastric region  Radiation: None    Intensity: 10/10     Progression of Symptoms:  same    Accompanying Signs & Symptoms:  Fever/Chills?: YES- chills   Gas/Bloating: YES- feels bloated   Nausea: YES  Vomitting: no- dry heaves   Diarrhea?: YES- for the past week, having it 3-4 times a day   Constipation:no   Dysuria or Hematuria: no    History:   Trauma: no   Previous similar pain: no    Previous tests done: none    Precipitating factors:   Does the pain change with:     Food: no      BM: no     Urination: no     Alleviating factors:  None     Therapies Tried and outcome: none     LMP:  not applicable     History of UC but reports he doesn't follow with GI.  I don't see any GI notes on file      Last scope in March 2018 - Diverticulosis in the sigmoid colon.                        - One 2 mm polyp in the transverse colon. Treated with a                        monopolar probe.                        - One 3 mm polyp at the splenic flexure. Biopsied.                        - One 5 mm polyp at the splenic flexure, removed with a                        hot snare. Resected and retrieved.                        - One 5 mm polyp in the rectum, removed with a hot                        snare. Resected and retrieved.                        - The distal rectum and anal verge are normal on                        retroflexion  view.   Recommendation:      - Return patient to hospital rouse for ongoing care.       Current Outpatient Medications   Medication Sig Dispense Refill     albuterol (PROAIR HFA/PROVENTIL HFA/VENTOLIN HFA) 108 (90 BASE) MCG/ACT Inhaler Inhale 2 puffs into the lungs every 4 hours as needed 1 Inhaler 11     baclofen (LIORESAL) 10 MG tablet Take 1/2 to one tablet up to three times a day 90 tablet 1     furosemide (LASIX) 20 MG tablet Take 1 tablet (20 mg) by mouth every morning 30 tablet 11     loratadine (CLARITIN) 10 MG tablet Take 1 tablet by mouth daily as needed for allergies. 30 tablet 6     Melatonin ER 5 MG TBCR Take 1 tablet by mouth At Bedtime 30 tablet 11     mometasone-formoterol (DULERA) 200-5 MCG/ACT oral inhaler Inhale 2 puffs into the lungs 2 times daily        pantoprazole (PROTONIX) 40 MG EC tablet TAKE ONE TABLET BY MOUTH EVERY MORNING BEFORE BREAKFAST 30 tablet 11     PANTOPRAZOLE SODIUM PO Take 40 mg by mouth every morning (before breakfast)       potassium chloride SA (K-DUR/KLOR-CON M) 10 MEQ CR tablet Take 1 tablet (10 mEq) by mouth daily 90 tablet 3     propranolol (INDERAL) 20 MG tablet Take 1 tablet (20 mg) by mouth 2 times daily 90 tablet 3     SODIUM BICARBONATE PO Take 650 mg by mouth daily as needed        tiotropium (SPIRIVA) 18 MCG capsule Inhale 1 capsule into the lungs daily Using PRN       traZODone (DESYREL) 50 MG tablet Take 1 tablet (50 mg) by mouth At Bedtime 90 tablet 3     acetaminophen (TYLENOL) 500 MG tablet Take 1-2 tablets by mouth every 6 hours as needed.       desonide (DESOWEN) 0.05 % external lotion Apply topically as needed 118 mL 1     order for DME Equipment being ordered: shower chair 1 Device 0     order for DME Equipment being ordered: Toilet seat riser 1 Device 0       Reviewed and updated as needed this visit by clinical staff  Tobacco  Allergies  Meds  Med Hx  Surg Hx  Fam Hx  Soc Hx      Reviewed and updated as needed this visit by Provider          ROS:  Constitutional, HEENT, cardiovascular, pulmonary, GI, , musculoskeletal, neuro, skin, endocrine and psych systems are negative, except as otherwise noted.    OBJECTIVE:     /66 (BP Location: Right arm, Patient Position: Sitting, Cuff Size: Adult Large)   Pulse 82   Temp 98  F (36.7  C) (Tympanic)   Wt 84.1 kg (185 lb 6.4 oz)   SpO2 94%   BMI 30.85 kg/m    Body mass index is 30.85 kg/m .  GENERAL APPEARANCE: alert, no distress, over weight, fatigued and jaundiced  EYES: PERRL and scleral icterus  HENT: nose and mouth without ulcers or lesions  NECK: no adenopathy, no asymmetry, masses, or scars and thyroid normal to palpation  RESP: Wheezing in bilateral lower lobes  CV: regular rates and rhythm, normal S1 S2, no S3 or S4 and no murmur, click or rub  ABDOMEN: soft, distended, non-tender, active bowel sounds  Rectal: not done     Diagnostic Test Results:  Results for orders placed or performed in visit on 12/20/18 (from the past 24 hour(s))   Comprehensive metabolic panel   Result Value Ref Range    Sodium 137 133 - 144 mmol/L    Potassium 3.9 3.4 - 5.3 mmol/L    Chloride 105 94 - 109 mmol/L    Carbon Dioxide 22 20 - 32 mmol/L    Anion Gap 10 3 - 14 mmol/L    Glucose 91 70 - 99 mg/dL    Urea Nitrogen 17 7 - 30 mg/dL    Creatinine 1.95 (H) 0.66 - 1.25 mg/dL    GFR Estimate 36 (L) >60 mL/min/[1.73_m2]    GFR Estimate If Black 42 (L) >60 mL/min/[1.73_m2]    Calcium 8.8 8.5 - 10.1 mg/dL    Bilirubin Total 1.9 (H) 0.2 - 1.3 mg/dL    Albumin 3.4 3.4 - 5.0 g/dL    Protein Total 7.8 6.8 - 8.8 g/dL    Alkaline Phosphatase 315 (H) 40 - 150 U/L    ALT 33 0 - 70 U/L    AST 60 (H) 0 - 45 U/L   CBC with platelets and differential   Result Value Ref Range    WBC PENDING 4.0 - 11.0 10e9/L    RBC Count PENDING 4.4 - 5.9 10e12/L    Hemoglobin PENDING 13.3 - 17.7 g/dL    Hematocrit PENDING 40.0 - 53.0 %    MCV PENDING 78 - 100 fl    MCH PENDING 26.5 - 33.0 pg    MCHC PENDING 31.5 - 36.5 g/dL    RDW PENDING 10.0 -  15.0 %    Platelet Count PENDING 150 - 450 10e9/L    Diff Method PENDING    BNP-N terminal pro   Result Value Ref Range    N-Terminal Pro Bnp 480 (H) 0 - 125 pg/mL   Troponin I   Result Value Ref Range    Troponin I ES <0.015 0.000 - 0.045 ug/L       ASSESSMENT/PLAN:     1. COPD exacerbation (H) -likely cause of his shortness of breath and chest pain.  Prior to ordering any lab testing I had recommended patient go to ER.  I recommended this multiple times throughout her visit.  Patient declined.  He has mildly elevated BNP, but no signs of heart failure on chest x-ray.  No pneumonia seen either.  I was extremely hesitant to prescribe prednisone given his report of GI bleeding (see below), but since the patient is adamantly opposed to seeking care in the ER by did prescribe this in hopes to improve his respiratory status.  I considered other etiologies such as acute coronary syndrome, pulmonary embolism, pneumonia (bacterial or viral.  I strongly consider doing a CT chest with contrast, however he has significant CKD, and IV contrast would be relatively contraindicated.  He would likely need a VQ scan to rule out PE.  I discussed this with patient, and the need for higher level of care, but he again declined.  I do not think a d-dimer would be helpful in a cirrhotic.  - predniSONE (DELTASONE) 20 MG tablet; Take 20 mg by mouth daily for 5 days.  Dispense: 5 tablet; Refill: 0    2. SOB (shortness of breath)  - Comprehensive metabolic panel  - CBC with platelets and differential  - BNP-N terminal pro  - Troponin I  - EKG 12-lead complete w/read - Clinics  - XR Chest 2 Views; Future  - Procalcitonin    3. Chest pain, unspecified type -he has had ongoing chest pain for several weeks.  He is been recommended to get stress test, but this is been put on hold due to his acute anemia.  - Comprehensive metabolic panel  - CBC with platelets and differential  - BNP-N terminal pro  - Troponin I  - EKG 12-lead complete w/read -  Clinics  - XR Chest 2 Views; Future  - Procalcitonin    4. Anemia due to blood loss, acute -patient only recently reported GI bleeding.  He has had acute on chronic anemia requiring hospitalizations multiple times in 2018.  Hematology workup has been unhelpful, as recommended iron replacement.  He needs a more comprehensive plan for his anemia, advised him to continue to follow-up with his primary .  Wonder about seeing a more specialized hematologist at the [facility].  Strongly encouraged patient to establish with a GI provider.  - CBC with platelets and differential    5. Rectal bleeding -did not perform a rectal exam today, as I planned to send the patient to the ER.  He then declined rectal exam.  On his problem list is a diagnosis of ulcerative colitis, but I cannot find any corroborating documentation to support this.  Patient is not able to provide any history so to support this either.  I recommended outpatient upper and lower scopes, but patient declined.  I strongly recommended that he follow-up with a gastroenterologist both for his cirrhosis and for ongoing rectal bleeding.  Patient will think about this recommendation, unsure he wants to follow with GI  - CBC with platelets and differential    6. Alcoholic cirrhosis of liver without ascites (H) -his bilirubin is more elevated.  Patient is vague about his active alcohol use, only stating that his last use was yesterday.  I strongly advised cessation which patient reports he will take under consideration.  Briefly looking through his outside records, it appears he has had a history of alcoholic hepatitis.  This is certainly possible, and again I recommended ER evaluation with hospital admission but patient declined.  - GASTROENTEROLOGY ADULT REF CONSULT ONLY    Patient Instructions   1. Start Prednisone 20 mg once daily.  Take with food  2. See  early jan  3. See GI  4. To ER if symptoms worsen  5. We discussed scopes but you  declined      Greater than 40 minutes was spent face-to-face with the patient by the provider.  Greater than 50% was spent in counseling or coordinating care for this patient.      Breana Morales,   [facility]      Addendum: After visit, his pro calcitonin returned elevated.  Will start antibiotic for presumed respiratory source.  Blood cultures were not collected.  Will again cautioned patient to go to the ER if symptoms do not improve.

## 2018-12-20 NOTE — PROGRESS NOTES
[facility]         SUBJECTIVE:  Abhijeet Mccauley, 58 year old male comes in for a hearing aid recheck. He is currently wearing 2014 Phonak GTIeo  hearing aids. He reports the custom tips are not working for him. As in the past, he continues to have difficulty cleaning the hearing aids.    OBJECTIVE:  Removed the custom tips and replaced with large open domes and retention tails bilaterally.Verified hearing aid functionality.   Patient feels more comfortable cleaning and taking care of the domes.    ASSESSMENT/PLAN:    Patient will check with his insurance on when he is eligible for new hearing aids.     Ai SELF-AAA, #[id number]

## 2018-12-20 NOTE — PATIENT INSTRUCTIONS
1. Start Prednisone 20 mg once daily.  Take with food  2. See  early jan  3. See GI  4. To ER if symptoms worsen  5. We discussed scopes but you declined

## 2019-01-02 PROBLEM — J44.1 COPD EXACERBATION (H): Status: ACTIVE | Noted: 2019-01-02

## 2019-01-04 DIAGNOSIS — M62.838 MUSCLE SPASM: ICD-10-CM

## 2019-01-07 RX ORDER — BACLOFEN 10 MG/1
TABLET ORAL
Qty: 90 TABLET | Refills: 1 | Status: SHIPPED | OUTPATIENT
Start: 2019-01-07 | End: 2019-03-09

## 2019-01-07 NOTE — TELEPHONE ENCOUNTER
Signed Prescriptions:                        Disp   Refills    baclofen (LIORESAL) 10 MG tablet           90 tab*1        Sig: Take 1/2 to one tablet up to three times a day  Authorizing Provider: LANDEN JETT    Reviewed, signed, e-prescribed.    Landen Quiñones MD  [facility]

## 2019-01-10 ENCOUNTER — TELEPHONE (OUTPATIENT)
Dept: FAMILY MEDICINE | Facility: CLINIC | Age: 59
End: 2019-01-10

## 2019-01-14 ENCOUNTER — HOSPITAL ENCOUNTER (OUTPATIENT)
Dept: NUCLEAR MEDICINE | Facility: CLINIC | Age: 59
Setting detail: NUCLEAR MEDICINE
Discharge: HOME | End: 2019-01-14
Attending: NURSE PRACTITIONER | Admitting: NURSE PRACTITIONER
Payer: COMMERCIAL

## 2019-01-14 DIAGNOSIS — R94.31 ABNORMAL ELECTROCARDIOGRAM: ICD-10-CM

## 2019-01-14 PROCEDURE — A9502 TC99M TETROFOSMIN: HCPCS | Performed by: INTERNAL MEDICINE

## 2019-01-14 PROCEDURE — 93017 CV STRESS TEST TRACING ONLY: CPT

## 2019-01-14 PROCEDURE — 78452 HT MUSCLE IMAGE SPECT MULT: CPT | Mod: 26 | Performed by: INTERNAL MEDICINE

## 2019-01-14 PROCEDURE — 93018 CV STRESS TEST I&R ONLY: CPT | Performed by: INTERNAL MEDICINE

## 2019-01-14 PROCEDURE — 34300033 ZZH RX 343: Performed by: INTERNAL MEDICINE

## 2019-01-14 PROCEDURE — 93016 CV STRESS TEST SUPVJ ONLY: CPT | Performed by: FAMILY MEDICINE

## 2019-01-14 PROCEDURE — 78452 HT MUSCLE IMAGE SPECT MULT: CPT

## 2019-01-14 PROCEDURE — 25000128 H RX IP 250 OP 636: Performed by: NURSE PRACTITIONER

## 2019-01-14 RX ORDER — REGADENOSON 0.08 MG/ML
0.4 INJECTION, SOLUTION INTRAVENOUS ONCE
Status: COMPLETED | OUTPATIENT
Start: 2019-01-14 | End: 2019-01-14

## 2019-01-14 RX ADMIN — TETROFOSMIN 10.7 MCI.: 1.38 INJECTION, POWDER, LYOPHILIZED, FOR SOLUTION INTRAVENOUS at 15:30

## 2019-01-14 RX ADMIN — TETROFOSMIN 30.9 MCI.: 1.38 INJECTION, POWDER, LYOPHILIZED, FOR SOLUTION INTRAVENOUS at 15:46

## 2019-01-14 RX ADMIN — REGADENOSON 0.4 MG: 0.08 INJECTION, SOLUTION INTRAVENOUS at 15:32

## 2019-01-15 ENCOUNTER — TELEPHONE (OUTPATIENT)
Dept: FAMILY MEDICINE | Facility: CLINIC | Age: 59
End: 2019-01-15

## 2019-01-15 NOTE — TELEPHONE ENCOUNTER
Reason for Call:  Other swelling    Detailed comments: Patient states both legs are swollen so much that he can hardly walk.    Phone Number Patient can be reached at: Home number on file [phone] (home)    Best Time: any    Can we leave a detailed message on this number? YES    Call taken on 1/15/2019 at 8:22 AM by Aye Lilly

## 2019-01-15 NOTE — TELEPHONE ENCOUNTER
Discussed ED precautions. Patient verbalizes understanding and agreement.      LEONARD ThaoN, RN

## 2019-01-15 NOTE — TELEPHONE ENCOUNTER
S-(situation): Bilateral leg swelling    B-(background): LOV 12/20/18 Andrew-COPD exacerbation/chest pain/shortness of breath    A-(assessment): Bilateral lower extremity swelling for 1 week. Patient states legs are somewhat painful. No weeping/open sores. Had stress test yesterday. Currently on 20 mg furosemide.     Denies: Chest pain, coughing blood/pink frothy sputum, severe shortness of breath, sudden onset, signs of infection    R-(recommendations): Advised need to be seen 2-4 hours per Telephone Triage Protocols for Nurses 5th Edition, [place]. There are no clinic appointments available today. Advised to go to ED/UC.     Patient declines stating he needs a 48 hour notice for transportation. He states that this is not emergent and can wait until Thursday.   Scheduled appointment as requested by patient.      Terra RODRIGUEZ, BSN, RN

## 2019-01-17 ENCOUNTER — OFFICE VISIT (OUTPATIENT)
Dept: FAMILY MEDICINE | Facility: CLINIC | Age: 59
End: 2019-01-17
Payer: COMMERCIAL

## 2019-01-17 ENCOUNTER — ANCILLARY PROCEDURE (OUTPATIENT)
Dept: GENERAL RADIOLOGY | Facility: CLINIC | Age: 59
End: 2019-01-17
Payer: COMMERCIAL

## 2019-01-17 VITALS
OXYGEN SATURATION: 98 % | BODY MASS INDEX: 32.88 KG/M2 | DIASTOLIC BLOOD PRESSURE: 78 MMHG | SYSTOLIC BLOOD PRESSURE: 120 MMHG | TEMPERATURE: 97.7 F | HEART RATE: 84 BPM | RESPIRATION RATE: 18 BRPM | WEIGHT: 197.6 LBS

## 2019-01-17 DIAGNOSIS — R06.02 SOB (SHORTNESS OF BREATH): ICD-10-CM

## 2019-01-17 DIAGNOSIS — R60.0 BILATERAL LEG EDEMA: Primary | ICD-10-CM

## 2019-01-17 DIAGNOSIS — M47.818 OSTEOARTHRITIS OF SPINE WITHOUT MYELOPATHY OR RADICULOPATHY, SACRAL AND SACROCOCCYGEAL REGION: ICD-10-CM

## 2019-01-17 DIAGNOSIS — Z78.9 ADMITS TO ALCOHOL USE: ICD-10-CM

## 2019-01-17 DIAGNOSIS — R60.0 BILATERAL LEG EDEMA: ICD-10-CM

## 2019-01-17 LAB
ALBUMIN SERPL-MCNC: 3.2 G/DL (ref 3.4–5)
ALP SERPL-CCNC: 327 U/L (ref 40–150)
ALT SERPL W P-5'-P-CCNC: 20 U/L (ref 0–70)
ANION GAP SERPL CALCULATED.3IONS-SCNC: 7 MMOL/L (ref 3–14)
AST SERPL W P-5'-P-CCNC: 38 U/L (ref 0–45)
BASOPHILS # BLD AUTO: 0.1 10E9/L (ref 0–0.2)
BASOPHILS NFR BLD AUTO: 0.8 %
BILIRUB SERPL-MCNC: 1.3 MG/DL (ref 0.2–1.3)
BUN SERPL-MCNC: 16 MG/DL (ref 7–30)
CALCIUM SERPL-MCNC: 9 MG/DL (ref 8.5–10.1)
CHLORIDE SERPL-SCNC: 109 MMOL/L (ref 94–109)
CO2 SERPL-SCNC: 24 MMOL/L (ref 20–32)
CREAT SERPL-MCNC: 1.88 MG/DL (ref 0.66–1.25)
DIFFERENTIAL METHOD BLD: ABNORMAL
EOSINOPHIL # BLD AUTO: 0.7 10E9/L (ref 0–0.7)
EOSINOPHIL NFR BLD AUTO: 6.3 %
ERYTHROCYTE [DISTWIDTH] IN BLOOD BY AUTOMATED COUNT: 19.1 % (ref 10–15)
ETHANOL SERPL-MCNC: 0.17 G/DL
GFR SERPL CREATININE-BSD FRML MDRD: 38 ML/MIN/{1.73_M2}
GLUCOSE SERPL-MCNC: 90 MG/DL (ref 70–99)
HCT VFR BLD AUTO: 27.9 % (ref 40–53)
HGB BLD-MCNC: 8.4 G/DL (ref 13.3–17.7)
LYMPHOCYTES # BLD AUTO: 1.9 10E9/L (ref 0.8–5.3)
LYMPHOCYTES NFR BLD AUTO: 18.6 %
MCH RBC QN AUTO: 27.5 PG (ref 26.5–33)
MCHC RBC AUTO-ENTMCNC: 30.1 G/DL (ref 31.5–36.5)
MCV RBC AUTO: 92 FL (ref 78–100)
MONOCYTES # BLD AUTO: 1.8 10E9/L (ref 0–1.3)
MONOCYTES NFR BLD AUTO: 17.5 %
NEUTROPHILS # BLD AUTO: 5.8 10E9/L (ref 1.6–8.3)
NEUTROPHILS NFR BLD AUTO: 56.8 %
PLATELET # BLD AUTO: 202 10E9/L (ref 150–450)
POTASSIUM SERPL-SCNC: 3.6 MMOL/L (ref 3.4–5.3)
PROT SERPL-MCNC: 7.4 G/DL (ref 6.8–8.8)
RBC # BLD AUTO: 3.05 10E12/L (ref 4.4–5.9)
SODIUM SERPL-SCNC: 140 MMOL/L (ref 133–144)
WBC # BLD AUTO: 10.3 10E9/L (ref 4–11)

## 2019-01-17 PROCEDURE — 71046 X-RAY EXAM CHEST 2 VIEWS: CPT

## 2019-01-17 PROCEDURE — 99214 OFFICE O/P EST MOD 30 MIN: CPT | Performed by: NURSE PRACTITIONER

## 2019-01-17 PROCEDURE — 80053 COMPREHEN METABOLIC PANEL: CPT | Performed by: NURSE PRACTITIONER

## 2019-01-17 PROCEDURE — 85025 COMPLETE CBC W/AUTO DIFF WBC: CPT | Performed by: NURSE PRACTITIONER

## 2019-01-17 PROCEDURE — 36415 COLL VENOUS BLD VENIPUNCTURE: CPT | Performed by: NURSE PRACTITIONER

## 2019-01-17 PROCEDURE — 80320 DRUG SCREEN QUANTALCOHOLS: CPT | Performed by: NURSE PRACTITIONER

## 2019-01-17 RX ORDER — FUROSEMIDE 20 MG
40 TABLET ORAL EVERY MORNING
Qty: 60 TABLET | Refills: 11 | Status: SHIPPED | OUTPATIENT
Start: 2019-01-17 | End: 2019-02-04

## 2019-01-17 ASSESSMENT — PAIN SCALES - GENERAL: PAINLEVEL: WORST PAIN (10)

## 2019-01-17 NOTE — NURSING NOTE
"Chief Complaint   Patient presents with     Edema     bilateral lower leg swelling, tingling, and itching x1 week all the way down to his toes. See triage note from 1/15/19. Weight gain. Headaches, shortness of breath. He states he has been drinking a lot more due to his knee pain, last drink of alcohol was this afternoon. He states he fell a few days ago face forward onto concrete and had excessive bleeding from his nose. No visual abrasions or bruising.      Initial /78 (BP Location: Right arm, Patient Position: Chair, Cuff Size: Adult Regular)   Pulse 84   Temp 97.7  F (36.5  C) (Tympanic)   Resp 18   Wt 89.6 kg (197 lb 9.6 oz)   SpO2 98%   BMI 32.88 kg/m   Estimated body mass index is 32.88 kg/m  as calculated from the following:    Height as of 7/19/18: 1.651 m (5' 5\").    Weight as of this encounter: 89.6 kg (197 lb 9.6 oz).    Medication Reconciliation: complete  Keli Unger MA  "

## 2019-01-17 NOTE — PROGRESS NOTES
SUBJECTIVE:   Abhijeet Mccauley is a 58 year old male who presents to clinic today for the following health issues:    Chief Complaint   Patient presents with     Edema     bilateral lower leg swelling, tingling, and itching x1 week all the way down to his toes. See triage note from 1/15/19. Weight gain. Headaches, shortness of breath. He states he has been drinking a lot more due to his knee pain, last drink of alcohol was this afternoon. He states he fell a few days ago face forward onto concrete and had excessive bleeding from his nose. No visual abrasions or bruising.      Concern - Bilateral Leg Swelling  Wt Readings from Last 3 Encounters:   01/17/19 89.6 kg (197 lb 9.6 oz)   12/20/18 84.1 kg (185 lb 6.4 oz)   12/05/18 88.4 kg (194 lb 12.8 oz)   Onset: one week     Description:   Taking Lasix 20mg tablets every morning    Intensity: severe, pain score 10/10    Progression of Symptoms:  worsening    Accompanying Signs & Symptoms:  Tingling, swelling, burning in lower legs and feet.    Precipitating factors:   Worsened by: activity and walking    Alleviating factors:  Improved by: elevation/rest    Patient has history of alcohol dependence with alcoholic cirrhosis of the liver presenting today with bilateral lower leg pain and edema that started about a week to week and a half ago.  Patient states that he has been drinking today because of his knee pain.  He has shortness of breath with exertion but is breathing fine when sitting down.  Patient called in 2 days ago and was recommended to go to  or ER and declined.  He recently underwent a stress test 3 days ago and this was normal.  Patient states that he had a recent fall and his back hurts.    Problem list and histories reviewed & adjusted, as indicated.  Additional history: as documented    Patient Active Problem List   Diagnosis     Essential hypertension     Acute myeloid leukemia in remission (H)     Sensorineural hearing loss, asymmetrical      Alcoholic cirrhosis of liver (H)     Acute alcoholic hepatitis     Coagulation defect (H)     Osteoarthrosis     Thrombocytopenia (H)     Universal ulcerative (chronic) colitis(556.6) (H)     CKD (chronic kidney disease) stage 3, GFR 30-59 ml/min (H)     Rosacea     Mild intermittent asthma without complication     Peptic ulcer disease     Uncomplicated alcohol dependence (H)     Tobacco dependence syndrome     Tubular adenoma of colon     Normocytic anemia     Leukocytosis with increased monocytes     Generalized weakness     COPD exacerbation (H)     Past Surgical History:   Procedure Laterality Date     AS TOTAL KNEE ARTHROPLASTY Left      BONE MARROW BIOPSY, BONE SPECIMEN, NEEDLE/TROCAR N/A 6/12/2018    Procedure: BIOPSY BONE MARROW;  Bone Marrow Biopsy;  Surgeon: Demar Sapp MD;  Location: [facility]     ESOPHAGOSCOPY, GASTROSCOPY, DUODENOSCOPY (EGD), COMBINED N/A 2/8/2018    Procedure: COMBINED ESOPHAGOSCOPY, GASTROSCOPY, DUODENOSCOPY (EGD), BIOPSY SINGLE OR MULTIPLE;  gastroscopy with biopsies;  Surgeon: Raz Cobb MD;  Location: [facility]     ESOPHAGOSCOPY, GASTROSCOPY, DUODENOSCOPY (EGD), COMBINED N/A 3/18/2018    Procedure: COMBINED ESOPHAGOSCOPY, GASTROSCOPY, DUODENOSCOPY (EGD);;  Surgeon: Raz Cobb MD;  Location: [facility]     LACERATION REPAIR Right     Right leg       Social History     Tobacco Use     Smoking status: Current Every Day Smoker     Packs/day: 0.50     Years: 30.00     Pack years: 15.00     Types: Cigarettes     Smokeless tobacco: Never Used     Tobacco comment: I strongly emphasized the importance of quitting smoking. 4-5 daily   Substance Use Topics     Alcohol use: Yes     Comment: Down to 3 beers per day.  Sometimes a cocktail also.     Family History   Problem Relation Age of Onset     Hypertension Mother      Coronary Artery Disease Father         MI         Current Outpatient Medications   Medication Sig Dispense Refill     acetaminophen (TYLENOL) 500 MG tablet Take  "1-2 tablets by mouth every 6 hours as needed.       albuterol (PROAIR HFA/PROVENTIL HFA/VENTOLIN HFA) 108 (90 BASE) MCG/ACT Inhaler Inhale 2 puffs into the lungs every 4 hours as needed 1 Inhaler 11     baclofen (LIORESAL) 10 MG tablet Take 1/2 to one tablet up to three times a day 90 tablet 1     desonide (DESOWEN) 0.05 % external lotion Apply topically as needed 118 mL 1     furosemide (LASIX) 20 MG tablet Take 1 tablet (20 mg) by mouth every morning 30 tablet 11     loratadine (CLARITIN) 10 MG tablet Take 1 tablet by mouth daily as needed for allergies. 30 tablet 6     Melatonin ER 5 MG TBCR Take 1 tablet by mouth At Bedtime 30 tablet 11     mometasone-formoterol (DULERA) 200-5 MCG/ACT oral inhaler Inhale 2 puffs into the lungs 2 times daily        order for DME Equipment being ordered: shower chair 1 Device 0     order for DME Equipment being ordered: Toilet seat riser 1 Device 0     pantoprazole (PROTONIX) 40 MG EC tablet TAKE ONE TABLET BY MOUTH EVERY MORNING BEFORE BREAKFAST 30 tablet 11     PANTOPRAZOLE SODIUM PO Take 40 mg by mouth every morning (before breakfast)       potassium chloride SA (K-DUR/KLOR-CON M) 10 MEQ CR tablet Take 1 tablet (10 mEq) by mouth daily 90 tablet 3     propranolol (INDERAL) 20 MG tablet Take 1 tablet (20 mg) by mouth 2 times daily 90 tablet 3     SODIUM BICARBONATE PO Take 650 mg by mouth daily as needed        tiotropium (SPIRIVA) 18 MCG capsule Inhale 1 capsule into the lungs daily Using PRN       traZODone (DESYREL) 50 MG tablet Take 1 tablet (50 mg) by mouth At Bedtime 90 tablet 3     Allergies   Allergen Reactions     Blood Transfusion Related (Informational Only)      Patient has a history of a clinically significant antibody against RBC antigens.  A delay in compatible RBCs may occur.     Famotidine Unknown and Other (See Comments)     Severe abdominal cramps     Cyclobenzaprine      hives     Gabapentin      Made pt's \"face break out\"     Methocarbamol      Made pt's " "\"face break out\"     Pepcid Cramps     Severe abdominal cramps     Vancomycin      Other reaction(s): Other  hallucinations     Vfend      IV     Hydrocodone-Acetaminophen Rash       Reviewed and updated as needed this visit by clinical staff  Tobacco  Allergies  Meds  Problems  Med Hx  Surg Hx  Fam Hx  Soc Hx        Reviewed and updated as needed this visit by Provider  Tobacco  Allergies  Meds  Problems  Med Hx  Surg Hx  Fam Hx         ROS:  CONSTITUTIONAL: NEGATIVE for fever, chills, change in weight  RESP: NEGATIVE for significant cough or SOB  CV: NEGATIVE for chest pain, palpitations or peripheral edema  MUSCULOSKELETAL: POSITIVE  for back pain on right side down to the knee and bilateral lower extremity edema with pain  PSYCHIATRIC: NEGATIVE for changes in mood or affect  ROS otherwise negative    OBJECTIVE:     /78 (BP Location: Right arm, Patient Position: Chair, Cuff Size: Adult Regular)   Pulse 84   Temp 97.7  F (36.5  C) (Tympanic)   Resp 18   Wt 89.6 kg (197 lb 9.6 oz)   SpO2 98%   BMI 32.88 kg/m    Body mass index is 32.88 kg/m .  GENERAL: healthy, alert and no distress  RESP: lungs clear to auscultation - no rales, rhonchi or wheezes  CV: regular rate and rhythm, normal S1 S2, no S3 or S4, no murmur, click or rub, no peripheral edema and peripheral pulses strong  ABDOMEN: soft, nontender, no hepatosplenomegaly, no masses and bowel sounds normal  MS: moderate edema to bilateral legs and feet; unable to assess depth of pitting edema due to pain with palpation of the leg  PSYCH: mentation appears normal, affect normal/bright    Diagnostic Test Results:  Results for orders placed or performed in visit on 01/17/19 (from the past 24 hour(s))   CBC with platelets and differential   Result Value Ref Range    WBC 10.3 4.0 - 11.0 10e9/L    RBC Count 3.05 (L) 4.4 - 5.9 10e12/L    Hemoglobin 8.4 (L) 13.3 - 17.7 g/dL    Hematocrit 27.9 (L) 40.0 - 53.0 %    MCV 92 78 - 100 fl    MCH 27.5 " 26.5 - 33.0 pg    MCHC 30.1 (L) 31.5 - 36.5 g/dL    RDW 19.1 (H) 10.0 - 15.0 %    Platelet Count 202 150 - 450 10e9/L    % Neutrophils 56.8 %    % Lymphocytes 18.6 %    % Monocytes 17.5 %    % Eosinophils 6.3 %    % Basophils 0.8 %    Absolute Neutrophil 5.8 1.6 - 8.3 10e9/L    Absolute Lymphocytes 1.9 0.8 - 5.3 10e9/L    Absolute Monocytes 1.8 (H) 0.0 - 1.3 10e9/L    Absolute Eosinophils 0.7 0.0 - 0.7 10e9/L    Absolute Basophils 0.1 0.0 - 0.2 10e9/L    Diff Method Automated Method    Comprehensive metabolic panel (BMP + Alb, Alk Phos, ALT, AST, Total. Bili, TP)   Result Value Ref Range    Sodium 140 133 - 144 mmol/L    Potassium 3.6 3.4 - 5.3 mmol/L    Chloride 109 94 - 109 mmol/L    Carbon Dioxide 24 20 - 32 mmol/L    Anion Gap 7 3 - 14 mmol/L    Glucose 90 70 - 99 mg/dL    Urea Nitrogen 16 7 - 30 mg/dL    Creatinine 1.88 (H) 0.66 - 1.25 mg/dL    GFR Estimate 38 (L) >60 mL/min/[1.73_m2]    GFR Estimate If Black 44 (L) >60 mL/min/[1.73_m2]    Calcium 9.0 8.5 - 10.1 mg/dL    Bilirubin Total 1.3 0.2 - 1.3 mg/dL    Albumin 3.2 (L) 3.4 - 5.0 g/dL    Protein Total 7.4 6.8 - 8.8 g/dL    Alkaline Phosphatase 327 (H) 40 - 150 U/L    ALT 20 0 - 70 U/L    AST 38 0 - 45 U/L   Alcohol ethyl   Result Value Ref Range    Ethanol g/dL 0.17 (H) <0.01 g/dL      XR CHEST TWO VIEWS   1/17/2019 3:39 PM      HISTORY: Bilateral leg edema. SOB (shortness of breath).     COMPARISON: Chest x-ray 12/20/2018.                                                                      IMPRESSION: PA and lateral views of the chest. Lungs are clear. Heart  is normal in size. No effusions are evident. No pneumothorax. Probable  upper lumbar vertebral body compression deformity. This area was not  well visualized on prior exam but was likely present.     CHRIS BORRERO MD      GATED MYOCARDIAL PERFUSION SCINTIGRAPHY WITH INTRAVENOUS PHARMACOLOGIC  VASODILATATION [facility]     1/14/2019 3:47 PM  ALVA MARISCAL  58 years  Male   1960.    Indication/Clinical History: Abnormal EKG, preop clearance    Impression  1.  Myocardial perfusion imaging using single isotope technique  demonstrated no evidence of ischemia or infarction.   2. Gated images demonstrated normal size left ventricle with normal  wall motion.  The left ventricular systolic function is normal with  ejection fraction 70%.  3. Compared to the prior study from  .    ASSESSMENT/PLAN:     1. Bilateral leg edema  Labs are stable after reviewing his last labs in December.  Discussed with patient that the only way that the leg edema will go away is if he stops drinking.  He wants pain medication and I discussed that I cannot do that with the alcohol use.  I discussed that he would have to stop drinking for a couple months to prove sobriety in order to get pain medication.  He stated that he will not do that.  I increased his Lasix dose to 40 mg daily and recommended that he f/u with PCP next week.    - CBC with platelets and differential  - Comprehensive metabolic panel (BMP + Alb, Alk Phos, ALT, AST, Total. Bili, TP)  - XR Chest 2 Views; Future  - Alcohol ethyl  - furosemide (LASIX) 20 MG tablet; Take 2 tablets (40 mg) by mouth every morning  Dispense: 60 tablet; Refill: 11    2. Admits to alcohol use  Blood alcohol today was 0.17.  Discussed with patient that he will not be able to drive.  He admits to having a ride.  Security was called for standby to make sure he was a passenger or to call 911 if he gets in his car.  - Alcohol ethyl    3. SOB (shortness of breath)  Chest x-ray negative.  - CBC with platelets and differential  - Comprehensive metabolic panel (BMP + Alb, Alk Phos, ALT, AST, Total. Bili, TP)  - XR Chest 2 Views; Future    See Patient Instructions    Yessica Jose NP  [facility]

## 2019-01-23 ENCOUNTER — TELEPHONE (OUTPATIENT)
Dept: AUDIOLOGY | Facility: CLINIC | Age: 59
End: 2019-01-23

## 2019-01-23 NOTE — TELEPHONE ENCOUNTER
Reason for Call:  Other call back    Detailed comments: pt calling stating he would like to discuss getting new HA. It has been 5 years.     Phone Number Patient can be reached at: Home number on file [phone] (home)    Best Time: any     Can we leave a detailed message on this number? YES    Call taken on 1/23/2019 at 11:11 AM by Elena Jin

## 2019-01-24 ENCOUNTER — ALLIED HEALTH/NURSE VISIT (OUTPATIENT)
Dept: AUDIOLOGY | Facility: CLINIC | Age: 59
End: 2019-01-24
Payer: COMMERCIAL

## 2019-01-24 ENCOUNTER — OFFICE VISIT (OUTPATIENT)
Dept: FAMILY MEDICINE | Facility: CLINIC | Age: 59
End: 2019-01-24
Payer: COMMERCIAL

## 2019-01-24 VITALS
RESPIRATION RATE: 12 BRPM | TEMPERATURE: 98 F | OXYGEN SATURATION: 98 % | DIASTOLIC BLOOD PRESSURE: 62 MMHG | SYSTOLIC BLOOD PRESSURE: 132 MMHG | HEART RATE: 74 BPM | WEIGHT: 185 LBS | HEIGHT: 65 IN | BODY MASS INDEX: 30.82 KG/M2

## 2019-01-24 DIAGNOSIS — L03.116 CELLULITIS OF LEFT LOWER EXTREMITY: ICD-10-CM

## 2019-01-24 DIAGNOSIS — L02.419 CELLULITIS AND ABSCESS OF LEG: ICD-10-CM

## 2019-01-24 DIAGNOSIS — L03.115 CELLULITIS OF RIGHT LOWER EXTREMITY: Primary | ICD-10-CM

## 2019-01-24 DIAGNOSIS — H90.3 SENSORINEURAL HEARING LOSS, ASYMMETRICAL: Primary | ICD-10-CM

## 2019-01-24 DIAGNOSIS — L03.119 CELLULITIS AND ABSCESS OF LEG: ICD-10-CM

## 2019-01-24 PROCEDURE — 99213 OFFICE O/P EST LOW 20 MIN: CPT | Performed by: NURSE PRACTITIONER

## 2019-01-24 PROCEDURE — V5266 BATTERY FOR HEARING DEVICE: HCPCS | Performed by: AUDIOLOGIST

## 2019-01-24 PROCEDURE — 99207 ZZC NO CHARGE LOS: CPT | Performed by: AUDIOLOGIST

## 2019-01-24 RX ORDER — CEPHALEXIN 500 MG/1
500 CAPSULE ORAL 3 TIMES DAILY
Qty: 21 CAPSULE | Refills: 0 | Status: SHIPPED | OUTPATIENT
Start: 2019-01-24 | End: 2019-02-14

## 2019-01-24 ASSESSMENT — MIFFLIN-ST. JEOR: SCORE: 1576.03

## 2019-01-24 NOTE — PROGRESS NOTES
[facility]         SUBJECTIVE:  Abhijeet Mccauley , 58 year old male requests hearing aid batteries. He is wearing 2014 Phonak Audoe [id number] hearing aids that were fit at another facility.     OBJECTIVE:  Verified date of last battery dispensing and battery size needed. Patient also given package of cerustop wax guards.    ASSESSMENT/PLAN:    Patient will  hearing aid batteries and supplies at the specialty clinic .    Ai KUNZ, #[id number]

## 2019-01-24 NOTE — TELEPHONE ENCOUNTER
Patient requests appointments and hearing aid batteries. Discussed with patient.     Ai Min M.A. F-AAA, #[id number]

## 2019-01-24 NOTE — PATIENT INSTRUCTIONS
1.  Take antibiotic as directed.  Follow-up in 1 week if any persistent symptoms.  2.  Follow-up in clinic with Dr. Valenzuela for discussion on release for surgery on your knee.

## 2019-01-24 NOTE — PROGRESS NOTES
SUBJECTIVE:   Abhijeet Mccauley is a 58 year old male who presents to clinic today for the following health issues:      Chief Complaint   Patient presents with     Leg Swelling     Pt is here to f/u on his bilateral leg edema. His Lasix was increased last week to 40 mg and his swelling has gone down a lot. He is also able to walk much better than last week. Pt states that he now has bilateral redness and some tenderness on both lower legs.      Patient states that he is doing much better on higher dose of lasix.  Swelling is very reduced.  He is still complaining about redness and pain in the distal lower extremities.      Problem list and histories reviewed & adjusted, as indicated.  Additional history: as documented    Patient Active Problem List   Diagnosis     Essential hypertension     Acute myeloid leukemia in remission (H)     Sensorineural hearing loss, asymmetrical     Alcoholic cirrhosis of liver (H)     Acute alcoholic hepatitis     Coagulation defect (H)     Osteoarthrosis     Thrombocytopenia (H)     Universal ulcerative (chronic) colitis(556.6) (H)     CKD (chronic kidney disease) stage 3, GFR 30-59 ml/min (H)     Rosacea     Mild intermittent asthma without complication     Peptic ulcer disease     Uncomplicated alcohol dependence (H)     Tobacco dependence syndrome     Tubular adenoma of colon     Normocytic anemia     Leukocytosis with increased monocytes     Generalized weakness     COPD exacerbation (H)     Past Surgical History:   Procedure Laterality Date     AS TOTAL KNEE ARTHROPLASTY Left      BONE MARROW BIOPSY, BONE SPECIMEN, NEEDLE/TROCAR N/A 6/12/2018    Procedure: BIOPSY BONE MARROW;  Bone Marrow Biopsy;  Surgeon: Demar Sapp MD;  Location: [facility]     ESOPHAGOSCOPY, GASTROSCOPY, DUODENOSCOPY (EGD), COMBINED N/A 2/8/2018    Procedure: COMBINED ESOPHAGOSCOPY, GASTROSCOPY, DUODENOSCOPY (EGD), BIOPSY SINGLE OR MULTIPLE;  gastroscopy with biopsies;  Surgeon: Raz Cobb  MD;  Location: [facility]     ESOPHAGOSCOPY, GASTROSCOPY, DUODENOSCOPY (EGD), COMBINED N/A 3/18/2018    Procedure: COMBINED ESOPHAGOSCOPY, GASTROSCOPY, DUODENOSCOPY (EGD);;  Surgeon: Raz Cobb MD;  Location: [facility]     LACERATION REPAIR Right     Right leg       Social History     Tobacco Use     Smoking status: Current Every Day Smoker     Packs/day: 0.50     Years: 30.00     Pack years: 15.00     Types: Cigarettes     Smokeless tobacco: Never Used     Tobacco comment: I strongly emphasized the importance of quitting smoking. 4-5 daily   Substance Use Topics     Alcohol use: Yes     Comment: Down to 3 beers per day.  Sometimes a cocktail also.     Family History   Problem Relation Age of Onset     Hypertension Mother      Coronary Artery Disease Father         MI         Current Outpatient Medications   Medication Sig Dispense Refill     acetaminophen (TYLENOL) 500 MG tablet Take 1-2 tablets by mouth every 6 hours as needed.       albuterol (PROAIR HFA/PROVENTIL HFA/VENTOLIN HFA) 108 (90 BASE) MCG/ACT Inhaler Inhale 2 puffs into the lungs every 4 hours as needed 1 Inhaler 11     baclofen (LIORESAL) 10 MG tablet Take 1/2 to one tablet up to three times a day 90 tablet 1     cephALEXin (KEFLEX) 500 MG capsule Take 1 capsule (500 mg) by mouth 3 times daily for 7 days 21 capsule 0     desonide (DESOWEN) 0.05 % external lotion Apply topically as needed 118 mL 1     furosemide (LASIX) 20 MG tablet Take 2 tablets (40 mg) by mouth every morning 60 tablet 11     loratadine (CLARITIN) 10 MG tablet Take 1 tablet by mouth daily as needed for allergies. 30 tablet 6     Melatonin ER 5 MG TBCR Take 1 tablet by mouth At Bedtime 30 tablet 11     mometasone-formoterol (DULERA) 200-5 MCG/ACT oral inhaler Inhale 2 puffs into the lungs 2 times daily        order for DME Equipment being ordered: shower chair 1 Device 0     order for DME Equipment being ordered: Toilet seat riser 1 Device 0     pantoprazole (PROTONIX) 40 MG EC tablet TAKE  "ONE TABLET BY MOUTH EVERY MORNING BEFORE BREAKFAST 30 tablet 11     PANTOPRAZOLE SODIUM PO Take 40 mg by mouth every morning (before breakfast)       potassium chloride SA (K-DUR/KLOR-CON M) 10 MEQ CR tablet Take 1 tablet (10 mEq) by mouth daily 90 tablet 3     propranolol (INDERAL) 20 MG tablet Take 1 tablet (20 mg) by mouth 2 times daily 90 tablet 3     SODIUM BICARBONATE PO Take 650 mg by mouth daily as needed        tiotropium (SPIRIVA) 18 MCG capsule Inhale 1 capsule into the lungs daily Using PRN       traZODone (DESYREL) 50 MG tablet Take 1 tablet (50 mg) by mouth At Bedtime 90 tablet 3     Allergies   Allergen Reactions     Blood Transfusion Related (Informational Only)      Patient has a history of a clinically significant antibody against RBC antigens.  A delay in compatible RBCs may occur.     Famotidine Unknown and Other (See Comments)     Severe abdominal cramps     Cyclobenzaprine      hives     Gabapentin      Made pt's \"face break out\"     Methocarbamol      Made pt's \"face break out\"     Pepcid Cramps     Severe abdominal cramps     Vancomycin      Other reaction(s): Other  hallucinations     Vfend      IV     Hydrocodone-Acetaminophen Rash       Reviewed and updated as needed this visit by clinical staff  Tobacco  Allergies  Meds  Problems  Med Hx  Surg Hx  Fam Hx  Soc Hx        Reviewed and updated as needed this visit by Provider  Tobacco  Allergies  Meds  Problems  Med Hx  Surg Hx  Fam Hx         ROS:  CONSTITUTIONAL: NEGATIVE for fever, chills, change in weight  RESP: NEGATIVE for significant cough or SOB  CV: NEGATIVE for chest pain, palpitations or peripheral edema  MUSCULOSKELETAL: POSITIVE  for bilateral lower leg pain and redness  PSYCHIATRIC: NEGATIVE for changes in mood or affect  ROS otherwise negative    OBJECTIVE:     /62   Pulse 74   Temp 98  F (36.7  C) (Tympanic)   Resp 12   Ht 1.651 m (5' 5\")   Wt 83.9 kg (185 lb)   SpO2 98%   BMI 30.79 kg/m    Body mass " index is 30.79 kg/m .  GENERAL: healthy, alert and no distress  RESP: lungs clear to auscultation - no rales, rhonchi or wheezes  CV: regular rate and rhythm, normal S1 S2, no S3 or S4, no murmur, click or rub, no peripheral edema and peripheral pulses strong  MS: normal muscle tone, normal range of motion and significantly reduced edema to lower extremities with no pitting edema, there is redness in distal lower extremities that is warm to the touch and tender.  PSYCH: mentation appears normal, affect normal/bright    Diagnostic Test Results:  none     ASSESSMENT/PLAN:     1. Cellulitis of right lower extremity  Treating empirically with keflex.  He will need to f/u with PCP for release for recommendation for surgery on his knee.  Recommend f/u in clinic if any persistent symptoms in 1 week.  - cephALEXin (KEFLEX) 500 MG capsule; Take 1 capsule (500 mg) by mouth 3 times daily for 7 days  Dispense: 21 capsule; Refill: 0    2. Cellulitis of left lower extremity  See note above.  - cephALEXin (KEFLEX) 500 MG capsule; Take 1 capsule (500 mg) by mouth 3 times daily for 7 days  Dispense: 21 capsule; Refill: 0    See Patient Instructions    Yessica Jose NP  [facility]

## 2019-01-31 NOTE — PROGRESS NOTES
Abhijeet was seen for a preop for a knee replacement on 11/20 and several concerns were noted at that time:     One is that he was started on nitrofurantoin for UTI.  Culture grew E cloacae that ws sensitive to the nitrofurantoin.     He was noted to be anemic with a Hgb of 8.3.  He reports iron pills and vitamins make him sick to his stomach.  He got very ill from IV iron as well.  Lab investigation was performed, which showed iron deficiency.  He was transfused 2 units of PRBCs.  Hematology referral was placed but has not been completed ***.  GI scoping was recommended, but he declined.     He has T wave inversions on EKG during his pre-op and stress test was ordered but delayed due to his anemia.  He did have this last month and it was normal.      He was seen on 12/20 with COPD exacerbation and was treated with prednisone.  He was referred to GI both for reported history of UC with recent rectal bleeding and also with history of alcoholic cirrhosis.      He was seen again on 1/17 for leg edema and Lasix was increased and alcohol cessation recommended.  Edema went down but he returned to clinic on 1/24 with increasing pain and redness and was started on Keflex for possible cellulitis.

## 2019-02-01 ENCOUNTER — OFFICE VISIT (OUTPATIENT)
Dept: FAMILY MEDICINE | Facility: CLINIC | Age: 59
End: 2019-02-01
Payer: COMMERCIAL

## 2019-02-01 VITALS
OXYGEN SATURATION: 96 % | WEIGHT: 182.4 LBS | HEART RATE: 79 BPM | DIASTOLIC BLOOD PRESSURE: 68 MMHG | HEIGHT: 65 IN | SYSTOLIC BLOOD PRESSURE: 118 MMHG | TEMPERATURE: 98.5 F | BODY MASS INDEX: 30.39 KG/M2

## 2019-02-01 DIAGNOSIS — I10 ESSENTIAL HYPERTENSION: ICD-10-CM

## 2019-02-01 DIAGNOSIS — M17.12 PRIMARY OSTEOARTHRITIS OF LEFT KNEE: ICD-10-CM

## 2019-02-01 DIAGNOSIS — K70.30 ALCOHOLIC CIRRHOSIS OF LIVER WITHOUT ASCITES (H): ICD-10-CM

## 2019-02-01 DIAGNOSIS — E87.6 HYPOKALEMIA: ICD-10-CM

## 2019-02-01 DIAGNOSIS — M47.818 OSTEOARTHRITIS OF SPINE WITHOUT MYELOPATHY OR RADICULOPATHY, SACRAL AND SACROCOCCYGEAL REGION: ICD-10-CM

## 2019-02-01 DIAGNOSIS — F17.200 TOBACCO DEPENDENCE SYNDROME: ICD-10-CM

## 2019-02-01 DIAGNOSIS — Z01.818 PREOP GENERAL PHYSICAL EXAM: Primary | ICD-10-CM

## 2019-02-01 DIAGNOSIS — F10.20 UNCOMPLICATED ALCOHOL DEPENDENCE (H): ICD-10-CM

## 2019-02-01 DIAGNOSIS — N18.30 CKD (CHRONIC KIDNEY DISEASE) STAGE 3, GFR 30-59 ML/MIN (H): ICD-10-CM

## 2019-02-01 DIAGNOSIS — D64.9 NORMOCYTIC ANEMIA: ICD-10-CM

## 2019-02-01 DIAGNOSIS — K51.00 UNIVERSAL ULCERATIVE (CHRONIC) COLITIS(556.6) (H): ICD-10-CM

## 2019-02-01 LAB
ALBUMIN SERPL-MCNC: 3.5 G/DL (ref 3.4–5)
ALP SERPL-CCNC: 319 U/L (ref 40–150)
ALT SERPL W P-5'-P-CCNC: 20 U/L (ref 0–70)
ANION GAP SERPL CALCULATED.3IONS-SCNC: 11 MMOL/L (ref 3–14)
AST SERPL W P-5'-P-CCNC: 45 U/L (ref 0–45)
BASOPHILS # BLD AUTO: 0.1 10E9/L (ref 0–0.2)
BASOPHILS NFR BLD AUTO: 0.7 %
BILIRUB SERPL-MCNC: 1.8 MG/DL (ref 0.2–1.3)
BUN SERPL-MCNC: 19 MG/DL (ref 7–30)
CALCIUM SERPL-MCNC: 9 MG/DL (ref 8.5–10.1)
CHLORIDE SERPL-SCNC: 101 MMOL/L (ref 94–109)
CO2 SERPL-SCNC: 23 MMOL/L (ref 20–32)
CREAT SERPL-MCNC: 2.17 MG/DL (ref 0.66–1.25)
DIFFERENTIAL METHOD BLD: ABNORMAL
EOSINOPHIL # BLD AUTO: 0.7 10E9/L (ref 0–0.7)
EOSINOPHIL NFR BLD AUTO: 5.5 %
ERYTHROCYTE [DISTWIDTH] IN BLOOD BY AUTOMATED COUNT: 19.8 % (ref 10–15)
GFR SERPL CREATININE-BSD FRML MDRD: 32 ML/MIN/{1.73_M2}
GLUCOSE SERPL-MCNC: 84 MG/DL (ref 70–99)
HCT VFR BLD AUTO: 29.7 % (ref 40–53)
HGB BLD-MCNC: 9 G/DL (ref 13.3–17.7)
IMM GRANULOCYTES # BLD: 0.1 10E9/L (ref 0–0.4)
IMM GRANULOCYTES NFR BLD: 0.8 %
LYMPHOCYTES # BLD AUTO: 2.6 10E9/L (ref 0.8–5.3)
LYMPHOCYTES NFR BLD AUTO: 22.1 %
MCH RBC QN AUTO: 27.4 PG (ref 26.5–33)
MCHC RBC AUTO-ENTMCNC: 30.3 G/DL (ref 31.5–36.5)
MCV RBC AUTO: 90 FL (ref 78–100)
MONOCYTES # BLD AUTO: 2 10E9/L (ref 0–1.3)
MONOCYTES NFR BLD AUTO: 16.4 %
NEUTROPHILS # BLD AUTO: 6.5 10E9/L (ref 1.6–8.3)
NEUTROPHILS NFR BLD AUTO: 54.5 %
NRBC # BLD AUTO: 0 10*3/UL
NRBC BLD AUTO-RTO: 0 /100
PLATELET # BLD AUTO: 218 10E9/L (ref 150–450)
POTASSIUM SERPL-SCNC: 3 MMOL/L (ref 3.4–5.3)
PROT SERPL-MCNC: 8.2 G/DL (ref 6.8–8.8)
RBC # BLD AUTO: 3.29 10E12/L (ref 4.4–5.9)
SODIUM SERPL-SCNC: 135 MMOL/L (ref 133–144)
WBC # BLD AUTO: 11.9 10E9/L (ref 4–11)

## 2019-02-01 PROCEDURE — 36415 COLL VENOUS BLD VENIPUNCTURE: CPT | Performed by: INTERNAL MEDICINE

## 2019-02-01 PROCEDURE — 99215 OFFICE O/P EST HI 40 MIN: CPT | Performed by: INTERNAL MEDICINE

## 2019-02-01 PROCEDURE — 80053 COMPREHEN METABOLIC PANEL: CPT | Performed by: INTERNAL MEDICINE

## 2019-02-01 PROCEDURE — 85027 COMPLETE CBC AUTOMATED: CPT | Performed by: INTERNAL MEDICINE

## 2019-02-01 ASSESSMENT — MIFFLIN-ST. JEOR: SCORE: 1564.24

## 2019-02-01 NOTE — PATIENT INSTRUCTIONS
I would strongly advise that you follow-up with hematology about the anemia and GI to discuss doing scopes to looks for sources of bleeding.    Surgery carries a risk of bleeding.  If your blood cell counts drop too low, you could need another transfusion.  Low blood counts can place a strain on your heart.      Before Your Surgery      Call your surgeon if there is any change in your health. This includes signs of a cold or flu (such as a sore throat, runny nose, cough, rash or fever).    Do not smoke, drink alcohol or take over the counter medicine (unless your surgeon or primary care doctor tells you to) for the 24 hours before and after surgery.    If you take prescribed drugs: Follow your doctor s orders about which medicines to take and which to stop until after surgery.    Eating and drinking prior to surgery: follow the instructions from your surgeon    Take a shower or bath the night before surgery. Use the soap your surgeon gave you to gently clean your skin. If you do not have soap from your surgeon, use your regular soap. Do not shave or scrub the surgery site.  Wear clean pajamas and have clean sheets on your bed.

## 2019-02-01 NOTE — PROGRESS NOTES
[facility]  [address] [address]  [phone]  Dept: [phone]    PRE-OP EVALUATION:  Today's date: 2019    Abhijeet Mccauley (: 1960) presents for pre-operative evaluation assessment as requested by Dr. Pan.  He requires evaluation and anesthesia risk assessment prior to undergoing surgery/procedure for treatment of Left total knee repair .    Proposed Surgery/ Procedure: TBD    Date of Surgery/ Procedure: TBD  Time of Surgery/ Procedure: TBD  Hospital/Surgical Facility: [facility]   Primary Physician: Chidi Valenzuela  Type of Anesthesia Anticipated: General    Patient has a Health Care Directive or Living Will:  YES on file     1. YES, right leg - Do you have a history of heart attack, stroke, stent, bypass or surgery on an artery in the head, neck, heart or legs?  2. NO - Do you ever have any pain or discomfort in your chest?  3. NO - Do you have a history of  Heart Failure?  4. YES - Are you troubled by shortness of breath when: walking on the level, up a slight hill or at night?  5. NO - Do you currently have a cold, bronchitis or other respiratory infection?  6. YES - Do you have a cough, shortness of breath or wheezing?  7. YES - Do you sometimes get pains in the calves of your legs when you walk?  8. NO - Do you or anyone in your family have previous history of blood clots?  9. NO - Do you or does anyone in your family have a serious bleeding problem such as prolonged bleeding following surgeries or cuts?  10. NO - Have you ever had problems with anemia or been told to take iron pills?  11. NO - Have you had any abnormal blood loss such as black, tarry or bloody stools, or abnormal vaginal bleeding?  12. YES - Have you ever had a blood transfusion?  13. NO - Have you or any of your relatives ever had problems with anesthesia?  14. NO - Do you have sleep apnea, excessive snoring or daytime drowsiness?  15. NO - Do you have any prosthetic  heart valves?  16. YES - Do you have prosthetic joints? Left knee   17. NO - Is there any chance that you may be pregnant?      HPI:     HPI related to upcoming procedure:     Abhijeet was seen for a preop for a knee replacement on 11/20 and several concerns were noted at that time:     One is that he was started on nitrofurantoin for UTI.  Culture grew E cloacae that ws sensitive to the nitrofurantoin.     He was noted to be anemic with a Hgb of 8.3.  He reports iron pills and vitamins make him sick to his stomach.  He got very ill from IV iron as well.  Lab investigation was performed, which showed iron deficiency.  He was transfused 2 units of PRBCs.  Hematology referral was placed but has not been completed.  GI scoping was recommended, but he declined.  He says energy level has been okay recently.     He has T wave inversions on EKG during his pre-op and stress test was ordered but delayed due to his anemia.  He did have this last month and it was normal.      He was seen on 12/20 with COPD exacerbation and was treated with prednisone.  He says his breathing is good now.  Has a bit of congestion, smoker's cough. No SOB or wheezing.    He was referred to GI both for reported history of UC with recent rectal bleeding and also with history of alcoholic cirrhosis.      He was seen again on 1/17 for leg edema and Lasix was increased and alcohol cessation recommended.  Edema went down but he returned to clinic on 1/24 with increasing pain and redness and was started on Keflex for possible cellulitis.   Leg swelling is down a lot, weight is down 15 pounds, and redness is improving, still some pain in the ankles    He was also found to have elevated blood alcohol level at this clinic visit.  He says he hasn't had anything to drink for the past week.          See problem list for active medical problems.  Problems all longstanding and stable, except as noted/documented.  See ROS for pertinent symptoms related to these  conditions.                                                                                                                                                          .    MEDICAL HISTORY:     Patient Active Problem List    Diagnosis Date Noted     COPD exacerbation (H) 01/02/2019     Priority: Medium     Normocytic anemia 05/17/2018     Priority: Medium     Leukocytosis with increased monocytes 05/17/2018     Priority: Medium     Generalized weakness 05/17/2018     Priority: Medium     Tubular adenoma of colon 03/23/2018     Priority: Medium     Collected: 3/18/2018   Received: 3/19/2018   Reported: 3/22/2018 19:19   Ordering Phy(s): SASKIA LEE     For improved result formatting, select 'View Enhanced Report Format' under    Linked Documents section.     SPECIMEN(S):   A: Splenic flexure polyp   B: Rectal polyp     FINAL DIAGNOSIS:   A.  Colon, splenic flexure, mucosal biopsies:   - Tubular adenoma.   - Negative for high-grade dysplasia and malignancy.     B.  Rectum, mucosal biopsy:   - Tubular adenoma.   - Negative for high-grade dysplasia and malignancy.        Peptic ulcer disease 03/16/2018     Priority: Medium     Uncomplicated alcohol dependence (H) 03/16/2018     Priority: Medium     Tobacco dependence syndrome 03/16/2018     Priority: Medium     Mild intermittent asthma without complication 11/13/2017     Priority: Medium     CKD (chronic kidney disease) stage 3, GFR 30-59 ml/min (H) 08/16/2017     Priority: Medium     Rosacea 08/16/2017     Priority: Medium     Sensorineural hearing loss, asymmetrical 03/28/2017     Priority: Medium     Thrombocytopenia (H) 11/11/2014     Priority: Medium     Universal ulcerative (chronic) colitis(556.6) (H) 11/11/2014     Priority: Medium     Alcoholic cirrhosis of liver (H) 11/04/2014     Priority: Medium     Coagulation defect (H) 11/04/2014     Priority: Medium     Acute alcoholic hepatitis 10/22/2014     Priority: Medium     Osteoarthrosis 02/21/2014     Priority:  Medium     Essential hypertension 03/28/2011     Priority: Medium     Problem list name updated by automated process. Provider to review       Acute myeloid leukemia in remission (H) 03/28/2011     Priority: Medium     S/p chemo, did not need bone marrow transplant        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)     s/p chemo, no bone marrow transplant     Chronic obstructive pulmonary disease 5/17/2018     COPD (chronic obstructive pulmonary disease) (H)      History of pulmonary embolus (PE)      Hypertension      PUD (peptic ulcer disease)      Tobacco dependence      Ulcerative colitis (H)      Past Surgical History:   Procedure Laterality Date     AS TOTAL KNEE ARTHROPLASTY Left      BONE MARROW BIOPSY, BONE SPECIMEN, NEEDLE/TROCAR N/A 6/12/2018    Procedure: BIOPSY BONE MARROW;  Bone Marrow Biopsy;  Surgeon: Demar Sapp MD;  Location: [facility]     ESOPHAGOSCOPY, GASTROSCOPY, DUODENOSCOPY (EGD), COMBINED N/A 2/8/2018    Procedure: COMBINED ESOPHAGOSCOPY, GASTROSCOPY, DUODENOSCOPY (EGD), BIOPSY SINGLE OR MULTIPLE;  gastroscopy with biopsies;  Surgeon: Raz Cobb MD;  Location: [facility]     ESOPHAGOSCOPY, GASTROSCOPY, DUODENOSCOPY (EGD), COMBINED N/A 3/18/2018    Procedure: COMBINED ESOPHAGOSCOPY, GASTROSCOPY, DUODENOSCOPY (EGD);;  Surgeon: Raz Cobb MD;  Location: [facility]     LACERATION REPAIR Right     Right leg     Current Outpatient Medications   Medication Sig Dispense Refill     acetaminophen (TYLENOL) 500 MG tablet Take 1-2 tablets by mouth every 6 hours as needed.       albuterol (PROAIR HFA/PROVENTIL HFA/VENTOLIN HFA) 108 (90 BASE) MCG/ACT Inhaler Inhale 2 puffs into the lungs every 4 hours as needed 1 Inhaler 11     baclofen (LIORESAL) 10 MG tablet Take 1/2 to one tablet up to three times a day 90 tablet 1     cephALEXin (KEFLEX) 500 MG  "capsule Take 1 capsule (500 mg) by mouth 3 times daily for 7 days 21 capsule 0     furosemide (LASIX) 20 MG tablet Take 2 tablets (40 mg) by mouth every morning 60 tablet 11     loratadine (CLARITIN) 10 MG tablet Take 1 tablet by mouth daily as needed for allergies. 30 tablet 6     mometasone-formoterol (DULERA) 200-5 MCG/ACT oral inhaler Inhale 2 puffs into the lungs 2 times daily        order for DME Equipment being ordered: shower chair 1 Device 0     order for DME Equipment being ordered: Toilet seat riser 1 Device 0     pantoprazole (PROTONIX) 40 MG EC tablet TAKE ONE TABLET BY MOUTH EVERY MORNING BEFORE BREAKFAST 30 tablet 11     potassium chloride SA (K-DUR/KLOR-CON M) 10 MEQ CR tablet Take 1 tablet (10 mEq) by mouth daily 90 tablet 3     propranolol (INDERAL) 20 MG tablet Take 1 tablet (20 mg) by mouth 2 times daily 90 tablet 3     SODIUM BICARBONATE PO Take 650 mg by mouth daily as needed        tiotropium (SPIRIVA) 18 MCG capsule Inhale 1 capsule into the lungs daily Using PRN       traZODone (DESYREL) 50 MG tablet Take 1 tablet (50 mg) by mouth At Bedtime 90 tablet 3     desonide (DESOWEN) 0.05 % external lotion Apply topically as needed 118 mL 1     PANTOPRAZOLE SODIUM PO Take 40 mg by mouth every morning (before breakfast)       OTC products: Tylenol    Allergies   Allergen Reactions     Blood Transfusion Related (Informational Only)      Patient has a history of a clinically significant antibody against RBC antigens.  A delay in compatible RBCs may occur.     Famotidine Unknown and Other (See Comments)     Severe abdominal cramps     Cyclobenzaprine      hives     Gabapentin      Made pt's \"face break out\"     Methocarbamol      Made pt's \"face break out\"     Pepcid Cramps     Severe abdominal cramps     Vancomycin      Other reaction(s): Other  hallucinations     Vfend      IV     Hydrocodone-Acetaminophen Rash      Latex Allergy: NO    Social History     Tobacco Use     Smoking status: Current Every " "Day Smoker     Packs/day: 0.50     Years: 30.00     Pack years: 15.00     Types: Cigarettes     Smokeless tobacco: Never Used     Tobacco comment: I strongly emphasized the importance of quitting smoking. 4-5 daily   Substance Use Topics     Alcohol use: Yes     Comment: Down to 3 beers per day.  Sometimes a cocktail also.     History   Drug Use     Types: Marijuana       REVIEW OF SYSTEMS:   Constitutional, neuro, ENT, endocrine, pulmonary, cardiac, gastrointestinal, genitourinary, musculoskeletal, integument and psychiatric systems are negative, except as otherwise noted plus he endorses some shakes and sweats that he experienced after discontinuing alcohol and some posterior headache    EXAM:   /68   Pulse 79   Temp 98.5  F (36.9  C) (Tympanic)   Ht 1.651 m (5' 5\")   Wt 82.7 kg (182 lb 6.4 oz)   SpO2 96%   BMI 30.35 kg/m         GENERAL APPEARANCE: alert and no distress     HENT: normal ear canals and TMs, nose and mouth without ulcers or lesions     NECK: no adenopathy, no asymmetry, masses, or scars     RESP: Very slight expiratory wheezes on the left side     CV: regular rates and rhythm, normal S1 S2, no S3 or S4 and no murmur, click or rub -     ABDOMEN:  soft, nontender, no HSM or masses and bowel sounds normal     MS: Bilateral lower leg edema and erythema around the ankles, edema slightly worse on the right       NEURO: mentation intact and speech normal     PSYCH: mentation appears normal. and affect normal/bright     LYMPHATICS: No cervical or supraclavicular nodes     DIAGNOSTICS:     Labs Resulted Today:   Results for orders placed or performed in visit on 02/01/19   Comprehensive metabolic panel   Result Value Ref Range    Sodium 135 133 - 144 mmol/L    Potassium 3.0 (L) 3.4 - 5.3 mmol/L    Chloride 101 94 - 109 mmol/L    Carbon Dioxide 23 20 - 32 mmol/L    Anion Gap 11 3 - 14 mmol/L    Glucose 84 70 - 99 mg/dL    Urea Nitrogen 19 7 - 30 mg/dL    Creatinine 2.17 (H) 0.66 - 1.25 mg/dL    " GFR Estimate 32 (L) >60 mL/min/[1.73_m2]    GFR Estimate If Black 37 (L) >60 mL/min/[1.73_m2]    Calcium 9.0 8.5 - 10.1 mg/dL    Bilirubin Total 1.8 (H) 0.2 - 1.3 mg/dL    Albumin 3.5 3.4 - 5.0 g/dL    Protein Total 8.2 6.8 - 8.8 g/dL    Alkaline Phosphatase 319 (H) 40 - 150 U/L    ALT 20 0 - 70 U/L    AST 45 0 - 45 U/L   CBC with platelets   Result Value Ref Range    WBC 11.9 (H) 4.0 - 11.0 10e9/L    RBC Count 3.29 (L) 4.4 - 5.9 10e12/L    Hemoglobin 9.0 (L) 13.3 - 17.7 g/dL    Hematocrit 29.7 (L) 40.0 - 53.0 %    MCV 90 78 - 100 fl    MCH 27.4 26.5 - 33.0 pg    MCHC 30.3 (L) 31.5 - 36.5 g/dL    RDW 19.8 (H) 10.0 - 15.0 %    Platelet Count 218 150 - 450 10e9/L   WBC Differential   Result Value Ref Range    Diff Method Automated Method     % Neutrophils 54.5 %    % Lymphocytes 22.1 %    % Monocytes 16.4 %    % Eosinophils 5.5 %    % Basophils 0.7 %    % Immature Granulocytes 0.8 %    Nucleated RBCs 0 0 /100    Absolute Neutrophil 6.5 1.6 - 8.3 10e9/L    Absolute Lymphocytes 2.6 0.8 - 5.3 10e9/L    Absolute Monocytes 2.0 (H) 0.0 - 1.3 10e9/L    Absolute Eosinophils 0.7 0.0 - 0.7 10e9/L    Absolute Basophils 0.1 0.0 - 0.2 10e9/L    Abs Immature Granulocytes 0.1 0 - 0.4 10e9/L    Absolute Nucleated RBC 0.0        Recent Labs   Lab Test 01/17/19  1537 12/20/18  1103  03/18/18  0624  03/16/18  1425   HGB 8.4* 9.3*   < > 7.8*   < > 7.4*    149*   < > 176   < > 179   INR  --   --   --  1.60*  --  1.34*    137   < > 140   < > 139   POTASSIUM 3.6 3.9   < > 3.6   < > 4.0   CR 1.88* 1.95*   < > 2.00*   < > 1.80*    < > = values in this interval not displayed.        IMPRESSION:   Reason for surgery/procedure: Left knee revision  Diagnosis/reason for consult: Preop evaluation    The proposed surgical procedure is considered INTERMEDIATE risk.    REVISED CARDIAC RISK INDEX  The patient has the following serious cardiovascular risks for perioperative complications such as (MI, PE, VFib and 3  AV Block):  Serum  Creatinine >2.0 mg/dl  INTERPRETATION: 1 risks: Class II (low risk - 0.9% complication rate)    The patient has the following additional risks for perioperative complications:  No identified additional risks  Alcohol abuse with risk of withdrawal-states he has been abstinent from alcohol for the past week      ICD-10-CM    1. Preop general physical exam Z01.818 Comprehensive metabolic panel     CBC with platelets     WBC Differential   2. Primary osteoarthritis of left knee M17.12 Comprehensive metabolic panel     CBC with platelets   3. CKD (chronic kidney disease) stage 3, GFR 30-59 ml/min (H) N18.3 Comprehensive metabolic panel     CBC with platelets   4. Normocytic anemia D64.9 Comprehensive metabolic panel     CBC with platelets   5. Essential hypertension I10    6. Alcoholic cirrhosis of liver without ascites (H) K70.30    7. Tobacco dependence syndrome F17.200    8. Uncomplicated alcohol dependence (H) F10.20    9. Universal ulcerative (chronic) colitis(556.6) (H) K51.00        RECOMMENDATIONS:     --Consult hospital rounder / IM to assist post-op medical management    Cardiovascular Risk  He did have an abnormal EKG done last year, so he proceeded with stress testing which was normal.  No further cardiac workup needed.      Pulmonary Risk  Incentive spirometry post op  Respiratory Therapy (Respiratory Care IP Consult)  post op  Maximize COPD treatment -he appears to be recovered from COPD exacerbation last month, continue current COPD medications  Advised smoking cessation.       Anemia  Anemia and does not require treatment prior to surgery.  Monitor Hemoglobin postoperatively.  His hemoglobin level has been stable.  We did discuss the risk of bleeding with surgery and that he may potentially require blood transfusion if his hemoglobin were to drop too low postoperatively.  We also discussed risk of anemia for increasing risk of strain on the heart and possibly death.  He is willing to accept these risks to  proceed with surgery.    Labs showed reduced renal function and K level, likely due to Lasix.  Will have him reduce his Lasix from 40mg daily to 20mg daily since his leg edema is improved and have labs rechecked in a about a week.     ADDENDUM (2/13/19):  K and creatinine are improved, however his blood alcohol level unfortunately came back at 0.21, indicating he has in fact not stopped drinking.  Will contact Dr. Pan's office to advise him of this.  Recommend that he see addiction medicine prior to surgery to help maintain abstinence.       --Patient is to take all scheduled medications on the day of surgery     APPROVAL PENDING plan for alcohol abstinence, visit with addiction medicine.     Chart documentation was done using Dragon dictation software. Although reviewed after completion, some errors may remain.     Signed Electronically by: Chidi Valenzuela MD    Copy of this evaluation report is provided to requesting physician.    [facility] Preop Guidelines    Revised Cardiac Risk Index

## 2019-02-01 NOTE — LETTER
February 4, 2019      Abhijeet Mccauley  [address] [place] [id number]        Dear ,    We are writing to inform you of your test results.  Hemoglobin level is actually improved a bit to 9.  However, your kidney function and potassium level are both down, likely due to the Lasix.  I'd like to reduce the Lasix dose to 20mg, increase high potassium foods (banana, apricot, artichoke, cantaloupe, broccoli, brussel sprouts, carrots, beets and beet greens, orange juice sweet potato, baked potato, tomatos, yogurt, squash), and have labs rechecked in about a week.  Please call [phone] for a lab only appointment.   If improved, then we can proceed with surgery.      Resulted Orders   Comprehensive metabolic panel   Result Value Ref Range    Sodium 135 133 - 144 mmol/L    Potassium 3.0 (L) 3.4 - 5.3 mmol/L    Chloride 101 94 - 109 mmol/L    Carbon Dioxide 23 20 - 32 mmol/L    Anion Gap 11 3 - 14 mmol/L    Glucose 84 70 - 99 mg/dL    Urea Nitrogen 19 7 - 30 mg/dL    Creatinine 2.17 (H) 0.66 - 1.25 mg/dL    GFR Estimate 32 (L) >60 mL/min/[1.73_m2]      Comment:      Non  GFR Calc  Starting 12/18/2018, serum creatinine based estimated GFR (eGFR) will be   calculated using the Chronic Kidney Disease Epidemiology Collaboration   (CKD-EPI) equation.      GFR Estimate If Black 37 (L) >60 mL/min/[1.73_m2]      Comment:       GFR Calc  Starting 12/18/2018, serum creatinine based estimated GFR (eGFR) will be   calculated using the Chronic Kidney Disease Epidemiology Collaboration   (CKD-EPI) equation.      Calcium 9.0 8.5 - 10.1 mg/dL    Bilirubin Total 1.8 (H) 0.2 - 1.3 mg/dL    Albumin 3.5 3.4 - 5.0 g/dL    Protein Total 8.2 6.8 - 8.8 g/dL    Alkaline Phosphatase 319 (H) 40 - 150 U/L    ALT 20 0 - 70 U/L    AST 45 0 - 45 U/L   CBC with platelets   Result Value Ref Range    WBC 11.9 (H) 4.0 - 11.0 10e9/L    RBC Count 3.29 (L) 4.4 - 5.9 10e12/L    Hemoglobin 9.0 (L) 13.3 - 17.7 g/dL     Hematocrit 29.7 (L) 40.0 - 53.0 %    MCV 90 78 - 100 fl    MCH 27.4 26.5 - 33.0 pg    MCHC 30.3 (L) 31.5 - 36.5 g/dL    RDW 19.8 (H) 10.0 - 15.0 %    Platelet Count 218 150 - 450 10e9/L   WBC Differential   Result Value Ref Range    Diff Method Automated Method     % Neutrophils 54.5 %    % Lymphocytes 22.1 %    % Monocytes 16.4 %    % Eosinophils 5.5 %    % Basophils 0.7 %    % Immature Granulocytes 0.8 %    Nucleated RBCs 0 0 /100    Absolute Neutrophil 6.5 1.6 - 8.3 10e9/L    Absolute Lymphocytes 2.6 0.8 - 5.3 10e9/L    Absolute Monocytes 2.0 (H) 0.0 - 1.3 10e9/L    Absolute Eosinophils 0.7 0.0 - 0.7 10e9/L    Absolute Basophils 0.1 0.0 - 0.2 10e9/L    Abs Immature Granulocytes 0.1 0 - 0.4 10e9/L    Absolute Nucleated RBC 0.0        If you have any questions or concerns, please call the clinic at the number listed above.       Sincerely,        Chidi Valenzuela MD/bmc

## 2019-02-04 RX ORDER — FUROSEMIDE 20 MG
20 TABLET ORAL EVERY MORNING
Qty: 30 TABLET | Refills: 11 | Status: SHIPPED | OUTPATIENT
Start: 2019-02-04 | End: 2019-05-02

## 2019-02-07 ENCOUNTER — TELEPHONE (OUTPATIENT)
Dept: FAMILY MEDICINE | Facility: CLINIC | Age: 59
End: 2019-02-07

## 2019-02-07 DIAGNOSIS — J45.20 MILD INTERMITTENT ASTHMA WITHOUT COMPLICATION: ICD-10-CM

## 2019-02-07 DIAGNOSIS — K21.9 GASTROESOPHAGEAL REFLUX DISEASE WITHOUT ESOPHAGITIS: ICD-10-CM

## 2019-02-07 RX ORDER — PROPRANOLOL HYDROCHLORIDE 20 MG/1
20 TABLET ORAL 2 TIMES DAILY
Qty: 180 TABLET | Refills: 1 | Status: SHIPPED | OUTPATIENT
Start: 2019-02-07 | End: 2019-05-02

## 2019-02-07 NOTE — TELEPHONE ENCOUNTER
He has been on propranolol for years and apparently tolerating fine.  Refills sent.    Chidi Valenzuela MD

## 2019-02-07 NOTE — TELEPHONE ENCOUNTER
"Requested Prescriptions   Pending Prescriptions Disp Refills     VENTOLIN  (90 Base) MCG/ACT inhaler [Pharmacy Med Name: VENTOLIN HFA 108MCG/ACT AERS]  Last Written Prescription Date:  11/13/2017  Last Fill Quantity: 1 inhaler,  # refills: 11   Last office visit: 2/1/2019 with prescribing provider:  Nicolas   Future Office Visit:   Next 5 appointments (look out 90 days)    Feb 13, 2019  2:30 PM CST  Return Visit with Landen Quiñones MD  [facility] ([facility]) [address]  [address]  [facility] [address]  [phone]   Feb 14, 2019  2:00 PM CST  Return Visit with Sheila Romano  [facility] ([facility]) [address] [address]  [phone]          18 g 11     Sig: INHALE 2 PUFFS INTO THE LUNGS EVERY 4 HOURS AS NEEDED    Asthma Maintenance Inhalers - Anticholinergics Failed - 2/7/2019 10:08 AM       Failed - Asthma control assessment score within normal limits in last 6 months    Please review ACT score.          Passed - Patient is age 12 years or older       Passed - Medication is active on med list       Passed - Recent (6 mo) or future (30 days) visit within the authorizing provider's specialty    Patient had office visit in the last 6 months or has a visit in the next 30 days with authorizing provider or within the authorizing provider's specialty.  See \"Patient Info\" tab in inbasket, or \"Choose Columns\" in Meds & Orders section of the refill encounter.              "

## 2019-02-07 NOTE — TELEPHONE ENCOUNTER
Routing refill request to provider for review/approval because:  Drug interaction warning: HIGH with Albuterol and Dulera

## 2019-02-07 NOTE — TELEPHONE ENCOUNTER
"Requested Prescriptions   Pending Prescriptions Disp Refills     propranolol (INDERAL) 20 MG tablet 90 tablet 3     Sig: Take 1 tablet (20 mg) by mouth 2 times daily    Beta-Blockers Protocol Passed - 2/7/2019 10:33 AM       Passed - Blood pressure under 140/90 in past 12 months    BP Readings from Last 3 Encounters:   02/01/19 118/68   01/24/19 132/62   01/17/19 120/78                Passed - Patient is age 6 or older       Passed - Recent (12 mo) or future (30 days) visit within the authorizing provider's specialty    Patient had office visit in the last 12 months or has a visit in the next 30 days with authorizing provider or within the authorizing provider's specialty.  See \"Patient Info\" tab in inbasket, or \"Choose Columns\" in Meds & Orders section of the refill encounter.             Passed - Medication is active on med list        Last Written Prescription Date:  8/1/18  Last Fill Quantity: 90,  # refills: 3   Last office visit: 2/1/2019 with prescribing provider:  Good   Future Office Visit:   Next 5 appointments (look out 90 days)    Feb 13, 2019  2:30 PM CST  Return Visit with Landen Quiñones MD  [facility] ([facility]) [address]  [address]  [facility] [place] [address]  [phone]   Feb 14, 2019  2:00 PM CST  Return Visit with Sheila Romano  [facility] ([facility]) [address] [address]  [phone]           "

## 2019-02-08 RX ORDER — ALBUTEROL SULFATE 90 UG/1
AEROSOL, METERED RESPIRATORY (INHALATION)
Qty: 18 G | Refills: 11 | Status: SHIPPED | OUTPATIENT
Start: 2019-02-08 | End: 2019-05-02

## 2019-02-08 NOTE — TELEPHONE ENCOUNTER
Routing refill request to provider for review/approval because:  Labs out of range:  ACT below 20    ACT Total Scores 3/28/2018 12/5/2018   ACT TOTAL SCORE (Goal Greater than or Equal to 20) 16 11   In the past 12 months, how many times did you visit the emergency room for your asthma without being admitted to the hospital? 0 0   In the past 12 months, how many times were you hospitalized overnight because of your asthma? 0 0     Fatou ESTRADA RN

## 2019-02-08 NOTE — TELEPHONE ENCOUNTER
FYI:    ACT Total Scores 3/28/2018 12/5/2018 2/8/2019   ACT TOTAL SCORE (Goal Greater than or Equal to 20) 16 11 16   In the past 12 months, how many times did you visit the emergency room for your asthma without being admitted to the hospital? 0 0 0   In the past 12 months, how many times were you hospitalized overnight because of your asthma? 0 0 0     Fatou ESTRADA RN

## 2019-02-08 NOTE — TELEPHONE ENCOUNTER
Could we have him complete an updated ACT?  His respiratory symptoms have improved since December.  Refills signed.    Thanks,  Chidi Valenzuela MD

## 2019-02-09 ASSESSMENT — ASTHMA QUESTIONNAIRES: ACT_TOTALSCORE: 16

## 2019-02-13 ENCOUNTER — OFFICE VISIT (OUTPATIENT)
Dept: PALLIATIVE MEDICINE | Facility: CLINIC | Age: 59
End: 2019-02-13
Payer: COMMERCIAL

## 2019-02-13 VITALS
BODY MASS INDEX: 30.29 KG/M2 | DIASTOLIC BLOOD PRESSURE: 65 MMHG | SYSTOLIC BLOOD PRESSURE: 124 MMHG | WEIGHT: 182 LBS | HEART RATE: 84 BPM

## 2019-02-13 DIAGNOSIS — M54.16 LUMBAR RADICULOPATHY: ICD-10-CM

## 2019-02-13 DIAGNOSIS — M54.2 CHRONIC NECK AND BACK PAIN: Primary | ICD-10-CM

## 2019-02-13 DIAGNOSIS — E87.6 HYPOKALEMIA: ICD-10-CM

## 2019-02-13 DIAGNOSIS — N18.30 CKD (CHRONIC KIDNEY DISEASE) STAGE 3, GFR 30-59 ML/MIN (H): ICD-10-CM

## 2019-02-13 DIAGNOSIS — F10.20 UNCOMPLICATED ALCOHOL DEPENDENCE (H): ICD-10-CM

## 2019-02-13 DIAGNOSIS — F17.200 TOBACCO DEPENDENCE SYNDROME: ICD-10-CM

## 2019-02-13 DIAGNOSIS — K70.30 ALCOHOLIC CIRRHOSIS OF LIVER WITHOUT ASCITES (H): ICD-10-CM

## 2019-02-13 DIAGNOSIS — M54.12 CERVICAL RADICULOPATHY: ICD-10-CM

## 2019-02-13 DIAGNOSIS — C92.01 ACUTE MYELOID LEUKEMIA IN REMISSION (H): ICD-10-CM

## 2019-02-13 DIAGNOSIS — M54.9 CHRONIC NECK AND BACK PAIN: Primary | ICD-10-CM

## 2019-02-13 DIAGNOSIS — G89.29 CHRONIC NECK AND BACK PAIN: Primary | ICD-10-CM

## 2019-02-13 DIAGNOSIS — D64.9 NORMOCYTIC ANEMIA: ICD-10-CM

## 2019-02-13 DIAGNOSIS — D68.9 COAGULATION DEFECT (H): ICD-10-CM

## 2019-02-13 DIAGNOSIS — G60.9 IDIOPATHIC PERIPHERAL NEUROPATHY: ICD-10-CM

## 2019-02-13 LAB
ALBUMIN SERPL-MCNC: 3.4 G/DL (ref 3.4–5)
ALP SERPL-CCNC: 330 U/L (ref 40–150)
ALT SERPL W P-5'-P-CCNC: 22 U/L (ref 0–70)
ANION GAP SERPL CALCULATED.3IONS-SCNC: 10 MMOL/L (ref 3–14)
AST SERPL W P-5'-P-CCNC: 50 U/L (ref 0–45)
BILIRUB SERPL-MCNC: 1.4 MG/DL (ref 0.2–1.3)
BUN SERPL-MCNC: 20 MG/DL (ref 7–30)
CALCIUM SERPL-MCNC: 9 MG/DL (ref 8.5–10.1)
CHLORIDE SERPL-SCNC: 103 MMOL/L (ref 94–109)
CO2 SERPL-SCNC: 23 MMOL/L (ref 20–32)
CREAT SERPL-MCNC: 2.07 MG/DL (ref 0.66–1.25)
ETHANOL SERPL-MCNC: 0.21 G/DL
GFR SERPL CREATININE-BSD FRML MDRD: 34 ML/MIN/{1.73_M2}
GLUCOSE SERPL-MCNC: 123 MG/DL (ref 70–99)
HGB BLD-MCNC: 8.4 G/DL (ref 13.3–17.7)
POTASSIUM SERPL-SCNC: 3.5 MMOL/L (ref 3.4–5.3)
PROT SERPL-MCNC: 8 G/DL (ref 6.8–8.8)
SODIUM SERPL-SCNC: 136 MMOL/L (ref 133–144)

## 2019-02-13 PROCEDURE — 99214 OFFICE O/P EST MOD 30 MIN: CPT | Performed by: ANESTHESIOLOGY

## 2019-02-13 PROCEDURE — 80320 DRUG SCREEN QUANTALCOHOLS: CPT | Performed by: INTERNAL MEDICINE

## 2019-02-13 PROCEDURE — 85018 HEMOGLOBIN: CPT | Performed by: INTERNAL MEDICINE

## 2019-02-13 PROCEDURE — 36415 COLL VENOUS BLD VENIPUNCTURE: CPT | Performed by: INTERNAL MEDICINE

## 2019-02-13 PROCEDURE — 80053 COMPREHEN METABOLIC PANEL: CPT | Performed by: INTERNAL MEDICINE

## 2019-02-13 RX ORDER — LEVETIRACETAM 250 MG/1
250 TABLET ORAL 2 TIMES DAILY
Qty: 60 TABLET | Refills: 1 | Status: SHIPPED | OUTPATIENT
Start: 2019-02-13 | End: 2019-05-02

## 2019-02-13 ASSESSMENT — PAIN SCALES - GENERAL: PAINLEVEL: WORST PAIN (10)

## 2019-02-13 NOTE — PROGRESS NOTES
[facility]    Date of visit: 2/13/2019    Chief complaint:   Chief Complaint   Patient presents with     Pain       Interval history:  Abhijeet Mccauley was last seen by me on 11/8/18 for right hip bursa injection and trigger point injections.  His last office visit was on 8/2/18.      Recommendations/plan at the last visit included:  Plan:  Diagnosis reviewed, treatment option addressed, and risk/benefits discussed.  Self-care instructions given.  I am recommending a multidisciplinary treatment plan to help this patient better manage his pain.       1. Physical Therapy: [facility] - physical therapy eval and treat  2. Clinical Health Psychologist to address issues of relaxation, behavioral change, coping style, and other factors important to improvement: deferred  3. Diagnostic Studies:   1. Updated cervical and lumbar MRI ordered today  4. Medication Management:   1. Continue careful use of Ibuprofen due to chronic kidney disease  2. Tylenol 500 mg 1-2 tabs three times a day - careful use due to cirrhosis  3. Trial of Baclofen 10 mg 1/2 to one tablet three times a day for muscle spasm  4. consider Medical Cannabis  5.   Further procedures recommended:               1.  May benefit from cervical or lumbar procedures depending on results of MRI scans              2.  Cervical trigger point injections may be helpful for muscle pain - ordered today              3.  Right hip bursa injection may be helpful for hip pain - ordered today              4.  Sacroiliac joint injections may be helpful as well  6.   Acupuncture: deferred  7.   Urine toxicology screen today: deferred - not prescribing opioids   8.   Recommendations/follow-up for PCP:  Continue current treatment - patient would benefit from orthopedic eval and treat for unstable left knee replacement  9.   Smoking cessation and smoking effects on healing and chronic pain discussed with patient  10.  Alcohol cessation would be very beneficial as well  - discussed with patient  11.  Release of information: n/a  12.  Follow up: 3 months or next available - will contact patient with MRI results    Since his last visit, Abhijeet Mccauley reports:  He continues with chronic hip pain, neck pain, and back pain.  He complains of pain that radiates down the arms and the legs bilaterally.  He complains that his hands are getting stuck after shoveling snow yesterday.  He complains of constant, aching, throbbing, stabbing, miserable and unbearable pain.    We tried to set him up with Medical Cannabis but he did not have an email address so it was never completed.    He complains that he is having numbness and tingling in his feet.  He complains that he is having redness in his legs since starting a new blood pressure medication as well as the increased numbness.    He continues to smoke about 1/2 ppd and he continues to drink alcohol daily and states he is down to about 4 beers a day.    Pain scores:  Pain intensity on average is 8 on a scale of 0-10.  Ranges from 7/10 to 10/10.    [place] Board of Pharmacy Data Base Reviewed:    N/A; not on opioids    Current pain treatments:   Baclofen 10 mg TID prn - states only taking once a day  Ibuprofen 400 mg Q6H prn  Trazodone 50 mg QHS  Tylenol 500 mg 1-2 tabs Q6H prn     Marijuana - usually once a day - takes his pain away and helps with mood    Past pain treatments:  Hydrocodone - rash  Flexeril - hives  Robaxin - made face break out  Gabapentin - made face break out    Injections:  - 11/8/18 right hip bursa injection, trigger point injections - helpful  - 8/7/18 right hip bursa injection, cervical trigger point injections - helpful    Date of last UDS:  Not on opioids  Component Value Flag Ref Range Units Status Collected Lab   Ethanol g/dL 0.17 Abnormally high   H  <0.01 g/dL Final 01/17/2019  3:37 PM 59       Side Effects: denies any problems    Medications:  Current Outpatient Medications   Medication Sig Dispense Refill      acetaminophen (TYLENOL) 500 MG tablet Take 1-2 tablets by mouth every 6 hours as needed.       baclofen (LIORESAL) 10 MG tablet Take 1/2 to one tablet up to three times a day 90 tablet 1     desonide (DESOWEN) 0.05 % external lotion Apply topically as needed 118 mL 1     furosemide (LASIX) 20 MG tablet Take 1 tablet (20 mg) by mouth every morning 30 tablet 11     loratadine (CLARITIN) 10 MG tablet Take 1 tablet by mouth daily as needed for allergies. 30 tablet 6     mometasone-formoterol (DULERA) 200-5 MCG/ACT oral inhaler Inhale 2 puffs into the lungs 2 times daily        pantoprazole (PROTONIX) 40 MG EC tablet TAKE ONE TABLET BY MOUTH EVERY MORNING BEFORE BREAKFAST 30 tablet 11     PANTOPRAZOLE SODIUM PO Take 40 mg by mouth every morning (before breakfast)       potassium chloride SA (K-DUR/KLOR-CON M) 10 MEQ CR tablet Take 1 tablet (10 mEq) by mouth daily 90 tablet 3     propranolol (INDERAL) 20 MG tablet Take 1 tablet (20 mg) by mouth 2 times daily 180 tablet 1     SODIUM BICARBONATE PO Take 650 mg by mouth daily as needed        tiotropium (SPIRIVA) 18 MCG capsule Inhale 1 capsule into the lungs daily Using PRN       traZODone (DESYREL) 50 MG tablet Take 1 tablet (50 mg) by mouth At Bedtime 90 tablet 3     VENTOLIN  (90 Base) MCG/ACT inhaler INHALE 2 PUFFS INTO THE LUNGS EVERY 4 HOURS AS NEEDED 18 g 11     order for DME Equipment being ordered: shower chair 1 Device 0     order for DME Equipment being ordered: Toilet seat riser 1 Device 0       Medical History: any changes in medical history since they were last seen? No    Review of Systems:  The 14 system ROS was reviewed from the intake questionnaire, and is positive for: fever, chills, weight gain, headache, dizziness, vision changes, hearing loss, tinnitus, allergies, cancer in remission, itching, cough, wheezing, shortness of breath, edema, joint pain, neck pain, back pain, weakness  Any bowel or bladder problems: denies changes  Mood:  good    Physical Exam:  /65   Pulse 84   Wt 82.6 kg (182 lb)   BMI 30.29 kg/m    Constitutional: alert and no distress.  Pt is obese  Head: Normocephalic. No masses, lesions, tenderness or abnormalities  ENT: EOMI, mucosal surfaces moist.  Neck with full ROM, posture fair  Cardiovascular: No edema or JVD appreciated.  Respiratory: Good diaphragmatic excursion. No wheezes appreciated.  Speaking in complete sentences without shortness of breath.  No accessory muscle use.   Musculoskeletal: extremities normal- no gross deformities noted, normal muscle tone and able to move about the exam room without difficulty.    Skin: no suspicious lesions or rashes appreciated on exposed areas  Neurologic: Gait normal and non-antalgic. Moving all extremities spontaneously, no apparent weakness.    Psychiatric: mentation appears normal, affect full and good eye contact.      Cervical Spine:  Good ROM, mild paraspinal tenderness, facet loading negative  Lumbar Spine:  Tender paraspinal muscles, flexes well, facet loading negative, SLR equivocal    MRI CERVICAL SPINE WITHOUT CONTRAST August 7, 2018 10:30 AM   FINDINGS: The cervical spinal cord and visualized portions of the  thoracic spinal cord appear normal. The visualized portions of the  brainstem and cerebellum appear normal.       Alignment: Normal.      Craniocervical Junction and C1-C2: Normal.     C2-C3: Mild degeneration of left facet joint, new. Otherwise normal.     C3-C4: Severe degenerative disc disease, worse. Posterior disc bulge  and osteophytes cause mild impression on the thecal sac and mild  bilateral foraminal stenosis. Normal spinal canal and facet joints.     C4-C5: Severe degenerative disc disease, worse. Mild degeneration left  facet joint, new. Mild spinal canal stenosis and bilateral mild  foraminal stenosis.     C5-C6: Severe degenerative disc disease, unchanged. Mild spinal canal  stenosis and bilateral moderate foraminal stenosis. Normal  facet  joints.     C6-C7: Moderate degenerative disc disease, worse. Otherwise normal.     C7-T1: Normal disc, facet joints, spinal canal and neural foramina.     T1-T2: Normal disc, facet joints, spinal canal and neural foramina.      Paraspinous Soft Tissues: Normal as visualized.     Bone Marrow: Normal signal intensity.                                                                    IMPRESSION: Multilevel degenerative disc disease and degenerative  facet arthropathy as described above, worse at some levels than in  2008.       LOCO URBANO MD    MRI LUMBAR SPINE WITHOUT CONTRAST   8/7/2018 10:17 AM   FINDINGS: The report is dictated assuming five lumbar-type vertebral  bodies, and radiographic correlation may be necessary. The distal  spinal cord and cauda equina appear normal.     T12-L1: Schmorl's nodes in the vertebral endplates, anterior to  posterior widening of the vertebrae and circumferential disc bulge  causing slight impression on the thecal sac. Findings indicate  juvenile discogenic disease and are unchanged.     L1-L2: Large Schmorl's nodes in the vertebral endplates especially  inferiorly in L1. Minimal circumferential disc bulge posteriorly.  Otherwise normal. No change.     L2-L3: Mild circumferential disc bulge and vertebral endplate  osteophytes, new since the previous exam. Normal facet joints and  neural foramina.     L3-L4: Anterior disc bulge and vertebral endplate osteophytes. Normal  posterior disc margin. Normal spinal canal, neural foramina and facet  joints.     L4-L5: Mild circumferential disc bulge and slight loss of disc height  with remodeling of vertebral endplates. Moderate degeneration left  facet joint and mild degeneration right facet joint. Subtle grade 1  spondylolisthesis. Moderate stenosis left neural foramen. Normal right  neural foramen. Findings have worsened since the previous exam.     L5-S1: Small left posterior paramedian disc bulge. Otherwise normal.  No  change.     Paraspinous soft tissues: Normal.      Bone marrow: Normal.                                                                     IMPRESSION:   1. Juvenile discogenic disease at T12-L1 and L1-L2 is unchanged.  2. L2-L3 new mild degenerative disc disease.  3. Worsening of L4-L5 mild degenerative disc disease and bilateral  degenerative facet arthropathy more on the left than on the right, now  with subtle grade 1 spondylolisthesis.   4. No change in L5-S1 left posterior paramedian disc bulge.     LOCO URBANO MD    Assessment:   1.  Chronic pain syndrome  2.  Chronic neck pain  3.  Chronic lower back pain  4.  Right hip bursitis  5.  Myofascial pain (muscle pain)  6.  Alcohol use/abuse  7.  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  Sacroiliac joint pain  9.  AML in remission  10.  Peripheral neuropathy    Abhijeet Mccauley is a 58 year old male who is seen at the pain clinic for chronic neck and back pain with radiating pain vs neuropathy pain due to continued EtOH use/abuse.  He states that he is open to trial of physical therapy and medications that may help with his pain.  He has not made much progress as far as decreasing Tobacco or Alcohol use and his kidney and liver function tests are abnormal, limiting treatments to some extent.  He is not thrilled about interventional procedures.  Our treatment plan is as outlined below.    Plan:  1. Physical Therapy:  PT eval and treat ordered today  2. Clinical Health Psychologist to address issues of relaxation, behavioral change, coping style, and other factors important to improvement.  deferred  3. Diagnostic Studies:  Reviewed diagnostic imaging  4. Medication Management:    1. Continue Ibuprofen 400 mg Q6H prn pain  2. Continue Tylenol 1000 mg TID  3. Continue Trazodone 50 mg QHS  4. Trial of Keppra 250 mg BID for nerve pain  5. Will certify for Medical Cannabis once paperwork is completed and returned  5. Further procedures recommended:   1. May  benefit from cervical epidural steroid injection C7-T1  2. May benefit from lumbar epidural steroid injection  3. Repeat hip bursa injections if needed  4. Repeat trigger point injections if needed  6. Recommendations to PCP: continue current treatment  7. Patient instructed to quit drinking as well  8. Smoking cessation and smoking effects on healing and chronic pain discussed with patient   9. Follow up: 3 months - patient instructed to call for injections if he desires to proceed    Total time spent was 30 minutes, and more than 50% of face to face time was spent in counseling and/or coordination of care regarding diagnosis, physical activity, medication management, and interventional procedures.    Landen Quiñones MD  [facility]

## 2019-02-13 NOTE — PATIENT INSTRUCTIONS
Assessment:   1.  Chronic pain syndrome  2.  Chronic neck pain  3.  Chronic lower back pain  4.  Right hip bursitis  5.  Myofascial pain (muscle pain)  6.  Alcohol use/abuse  7.  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  Sacroiliac joint pain  9.  AML in remission  10.  Peripheral neuropathy      Plan:  1. Physical Therapy:  PT eval and treat ordered today  2. Clinical Health Psychologist to address issues of relaxation, behavioral change, coping style, and other factors important to improvement.  deferred  3. Diagnostic Studies:  Reviewed diagnostic imaging  4. Medication Management:    1. Continue Ibuprofen 400 mg Q6H prn pain  2. Continue Tylenol 1000 mg TID  3. Continue Trazodone 50 mg QHS  4. Trial of Keppra 250 mg BID for nerve pain  5. Will certify for Medical Cannabis once paperwork is completed and returned  5. Further procedures recommended:   1. May benefit from cervical epidural steroid injection C7-T1  2. May benefit from lumbar epidural steroid injection  3. Repeat hip bursa injections if needed  4. Repeat trigger point injections if needed  6. Recommendations to PCP: continue current treatment  7. Patient instructed to quit drinking as well  8. Smoking cessation and smoking effects on healing and chronic pain discussed with patient   9. Follow up: 3 months - patient instructed to call for injections if he desires to proceed      ----------------------------------------------------------------  Clinic Number:  [phone]   Call this number with any questions about your care and for scheduling assistance. Calls are returned Monday through Friday between 8 AM and 4:30 PM. We usually get back to you within 2 business days depending on the issue/request.       Medication refills:    For non-narcotic medications, call your pharmacy directly to request a refill. The pharmacy will contact the [facility] for authorization. Please allow 3-4 days for these refills to be processed.     For  narcotic refills, call the clinic number or send a [employer]Tune message. Please contact us 7-10 days before your refill is due. The message MUST include the name of the specific medication(s) requested and how you would like to receive the prescription(s). The options are as follows:    Pain Clinic staff can mail the prescription to your pharmacy. Please tell us the name of the pharmacy.    You may pick the prescription up at the Pain Clinic (tell us the location) or during a clinic visit with your pain provider    Pain Clinic staff can deliver the prescription to the [facility] pharmacy in the clinic building. Please tell us the location.      We believe regular attendance is key to your success in our program.    Any time you are unable to keep your appointment we ask that you call us at least 24 hours in advance to let us know. This will allow us to offer the appointment time to another patient.

## 2019-02-14 ENCOUNTER — OFFICE VISIT (OUTPATIENT)
Dept: AUDIOLOGY | Facility: CLINIC | Age: 59
End: 2019-02-14
Payer: COMMERCIAL

## 2019-02-14 ENCOUNTER — OFFICE VISIT (OUTPATIENT)
Dept: OTOLARYNGOLOGY | Facility: CLINIC | Age: 59
End: 2019-02-14
Payer: COMMERCIAL

## 2019-02-14 VITALS
HEIGHT: 65 IN | WEIGHT: 182 LBS | BODY MASS INDEX: 30.32 KG/M2 | DIASTOLIC BLOOD PRESSURE: 72 MMHG | SYSTOLIC BLOOD PRESSURE: 115 MMHG | TEMPERATURE: 98.4 F | HEART RATE: 78 BPM

## 2019-02-14 DIAGNOSIS — H90.3 SENSORINEURAL HEARING LOSS, ASYMMETRICAL: Primary | ICD-10-CM

## 2019-02-14 DIAGNOSIS — H90.3 ASYMMETRICAL SENSORINEURAL HEARING LOSS: Primary | ICD-10-CM

## 2019-02-14 PROCEDURE — 99213 OFFICE O/P EST LOW 20 MIN: CPT | Performed by: OTOLARYNGOLOGY

## 2019-02-14 PROCEDURE — 92550 TYMPANOMETRY & REFLEX THRESH: CPT | Performed by: AUDIOLOGIST

## 2019-02-14 PROCEDURE — 99207 ZZC NO CHARGE LOS: CPT | Performed by: AUDIOLOGIST

## 2019-02-14 PROCEDURE — 92557 COMPREHENSIVE HEARING TEST: CPT | Performed by: AUDIOLOGIST

## 2019-02-14 ASSESSMENT — MIFFLIN-ST. JEOR: SCORE: 1562.43

## 2019-02-14 NOTE — PROGRESS NOTES
History of Present Illness - Abhijeet Mccauley is a 58 year old male I have been following for asymmetric SNHL. He has bilateral hearing aids and gets good use from them, but the current set is 5 years old and he hopes to pursue new ones. He denies any new ear troubles.    Past Medical History -   Patient Active Problem List   Diagnosis     Essential hypertension     Acute myeloid leukemia in remission (H)     Sensorineural hearing loss, asymmetrical     Alcoholic cirrhosis of liver (H)     Acute alcoholic hepatitis     Coagulation defect (H)     Osteoarthrosis     Thrombocytopenia (H)     Universal ulcerative (chronic) colitis(556.6) (H)     CKD (chronic kidney disease) stage 3, GFR 30-59 ml/min (H)     Rosacea     Mild intermittent asthma without complication     Peptic ulcer disease     Uncomplicated alcohol dependence (H)     Tobacco dependence syndrome     Tubular adenoma of colon     Normocytic anemia     Leukocytosis with increased monocytes     Generalized weakness     COPD exacerbation (H)       Current Medications -   Current Outpatient Medications:      acetaminophen (TYLENOL) 500 MG tablet, Take 1-2 tablets by mouth every 6 hours as needed., Disp: , Rfl:      baclofen (LIORESAL) 10 MG tablet, Take 1/2 to one tablet up to three times a day, Disp: 90 tablet, Rfl: 1     desonide (DESOWEN) 0.05 % external lotion, Apply topically as needed, Disp: 118 mL, Rfl: 1     furosemide (LASIX) 20 MG tablet, Take 1 tablet (20 mg) by mouth every morning, Disp: 30 tablet, Rfl: 11     levETIRAcetam (KEPPRA) 250 MG tablet, Take 1 tablet (250 mg) by mouth 2 times daily, Disp: 60 tablet, Rfl: 1     loratadine (CLARITIN) 10 MG tablet, Take 1 tablet by mouth daily as needed for allergies., Disp: 30 tablet, Rfl: 6     mometasone-formoterol (DULERA) 200-5 MCG/ACT oral inhaler, Inhale 2 puffs into the lungs 2 times daily , Disp: , Rfl:      order for DME, Equipment being ordered: shower chair, Disp: 1 Device, Rfl: 0     order for  "DME, Equipment being ordered: Toilet seat riser, Disp: 1 Device, Rfl: 0     pantoprazole (PROTONIX) 40 MG EC tablet, TAKE ONE TABLET BY MOUTH EVERY MORNING BEFORE BREAKFAST, Disp: 30 tablet, Rfl: 11     PANTOPRAZOLE SODIUM PO, Take 40 mg by mouth every morning (before breakfast), Disp: , Rfl:      potassium chloride SA (K-DUR/KLOR-CON M) 10 MEQ CR tablet, Take 1 tablet (10 mEq) by mouth daily, Disp: 90 tablet, Rfl: 3     propranolol (INDERAL) 20 MG tablet, Take 1 tablet (20 mg) by mouth 2 times daily, Disp: 180 tablet, Rfl: 1     SODIUM BICARBONATE PO, Take 650 mg by mouth daily as needed , Disp: , Rfl:      tiotropium (SPIRIVA) 18 MCG capsule, Inhale 1 capsule into the lungs daily Using PRN, Disp: , Rfl:      traZODone (DESYREL) 50 MG tablet, Take 1 tablet (50 mg) by mouth At Bedtime, Disp: 90 tablet, Rfl: 3     VENTOLIN  (90 Base) MCG/ACT inhaler, INHALE 2 PUFFS INTO THE LUNGS EVERY 4 HOURS AS NEEDED, Disp: 18 g, Rfl: 11    Allergies -   Allergies   Allergen Reactions     Blood Transfusion Related (Informational Only)      Patient has a history of a clinically significant antibody against RBC antigens.  A delay in compatible RBCs may occur.     Famotidine Unknown and Other (See Comments)     Severe abdominal cramps     Cyclobenzaprine      hives     Gabapentin      Made pt's \"face break out\"     Methocarbamol      Made pt's \"face break out\"     Pepcid Cramps     Severe abdominal cramps     Vancomycin      Other reaction(s): Other  hallucinations     Vfend      IV     Hydrocodone-Acetaminophen Rash       Social History -   Social History     Socioeconomic History     Marital status: Single     Spouse name: None     Number of children: None     Years of education: None     Highest education level: None   Social Needs     Financial resource strain: None     Food insecurity - worry: None     Food insecurity - inability: None     Transportation needs - medical: None     Transportation needs - non-medical: None " "  Occupational History     None   Tobacco Use     Smoking status: Current Every Day Smoker     Packs/day: 0.50     Years: 30.00     Pack years: 15.00     Types: Cigarettes     Smokeless tobacco: Never Used     Tobacco comment: I strongly emphasized the importance of quitting smoking. 4-5 daily   Substance and Sexual Activity     Alcohol use: Yes     Comment: Down to 3 beers per day.  Sometimes a cocktail also.     Drug use: Yes     Types: Marijuana     Sexual activity: None   Other Topics Concern     Parent/sibling w/ CABG, MI or angioplasty before 65F 55M? Not Asked   Social History Narrative     None       Family History -   Family History   Problem Relation Age of Onset     Hypertension Mother      Coronary Artery Disease Father         MI       Review of Systems - As per HPI and PMHx, otherwise 7 system review of the head and neck negative. 10+ system review negative.    Physical Exam  /72 (BP Location: Left arm, Patient Position: Sitting, Cuff Size: Adult Regular)   Pulse 78   Temp 98.4  F (36.9  C) (Oral)   Ht 1.651 m (5' 5\")   Wt 82.6 kg (182 lb)   BMI 30.29 kg/m    General - The patient is well nourished and well developed, and appears to have good nutritional status.  Alert and oriented to person and place, answers questions and cooperates with examination appropriately.   Head and Face - Normocephalic and atraumatic, with no gross asymmetry noted of the contour of the facial features.  The facial nerve is intact, with strong symmetric movements.  Voice and Breathing - The patient was breathing comfortably without the use of accessory muscles. There was no wheezing, stridor, or stertor.  The patients voice was clear and strong, and had appropriate pitch and quality.  Ears - bilateral ear canals are clear. Bilateral TMs intact without effusion.    Audiogram 02/14/19 is essentially unchanged from 1 year ago.        Assessment - Abhijeet Mccauley is a 58 year old male with stable bilateral SNHL, " with some left asymmetric loss. I believe he is doing well with hearing aids and given the stability of his hearing I do not think an MRI Brain is high yield. He is medically cleared to pursue new hearing aids. Return if there is any sudden change in hearing in the future.       Dr. Lennox Polanco MD  Otolaryngology  [facility]

## 2019-02-14 NOTE — PROGRESS NOTES
AUDIOLOGY REPORT    SUBJECTIVE:  Abhijeet Mccauley is a 58 year old male who was seen in the Audiology Clinic at [facility] for an audiologic evaluation, referred by Dr. Polanco.  The patient has been seen previously in this clinic on 4/9/2018 for assessment and results indicated a mild to severe asymmetrical sensorineural hearing loss bilaterally. The patient reports he is hearing better in his right ear. The patient denies bilateral otalgia.     OBJECTIVE:  Otoscopic exam indicates ears are clear of cerumen bilaterally     Pure Tone Thresholds assessed using conventional audiometry with good  reliability from 250-8000 Hz bilaterally using circumaural headphones     RIGHT:  normal, mild and moderate sensorineural hearing loss    LEFT:    borderline-normal, mild, moderate and severe sensorineural hearing loss    Tympanogram:    RIGHT: normal eardrum mobility, 1000 Hz ipsi/contra reflexes present     LEFT:   normal eardrum mobility ,1000 Hz ipsi/contra reflexes present     Speech Reception Threshold:    RIGHT: 30 dB HL    LEFT:   50 dB HL  Word Recognition Score:     RIGHT: 76% at 75 dB HL using W22 recorded word list.    LEFT:   56% at 75 dB HL using W22 recorded word list.      ASSESSMENT:   Normal sloping to moderate to severe asymmetrical sensorineural hearing loss bilaterally.     Compared to patient's previous audiogram dated 4/9/2018, hearing has remained stable bilaterally.  Today s results were discussed with the patient in detail.     PLAN: It is recommended that the patient be seen by Dr. Polanco for medical evaluation of their ears and hearing evaluation. Patient was counseled regarding hearing loss and impact on communication. Patient is a  candidate for new amplification at this time.A [facility] [employer] information packet was given to the patient.Please call this clinic with questions regarding these results or recommendations.        Ai Min M.A. ARAMIS-AAA  Clinical  audiologist [place] [facility] [id number]  2/14/2019

## 2019-02-14 NOTE — LETTER
2/14/2019         RE: Abhijeet Mccauley  [address] [place] [id number]        Dear Colleague,    Thank you for referring your patient, Abhijeet Mccauley, to the [facility]. Please see a copy of my visit note below.    History of Present Illness - Abhijeet Mccauley is a 58 year old male I have been following for asymmetric SNHL. He has bilateral hearing aids and gets good use from them, but the current set is 5 years old and he hopes to pursue new ones. He denies any new ear troubles.    Past Medical History -   Patient Active Problem List   Diagnosis     Essential hypertension     Acute myeloid leukemia in remission (H)     Sensorineural hearing loss, asymmetrical     Alcoholic cirrhosis of liver (H)     Acute alcoholic hepatitis     Coagulation defect (H)     Osteoarthrosis     Thrombocytopenia (H)     Universal ulcerative (chronic) colitis(556.6) (H)     CKD (chronic kidney disease) stage 3, GFR 30-59 ml/min (H)     Rosacea     Mild intermittent asthma without complication     Peptic ulcer disease     Uncomplicated alcohol dependence (H)     Tobacco dependence syndrome     Tubular adenoma of colon     Normocytic anemia     Leukocytosis with increased monocytes     Generalized weakness     COPD exacerbation (H)       Current Medications -   Current Outpatient Medications:      acetaminophen (TYLENOL) 500 MG tablet, Take 1-2 tablets by mouth every 6 hours as needed., Disp: , Rfl:      baclofen (LIORESAL) 10 MG tablet, Take 1/2 to one tablet up to three times a day, Disp: 90 tablet, Rfl: 1     desonide (DESOWEN) 0.05 % external lotion, Apply topically as needed, Disp: 118 mL, Rfl: 1     furosemide (LASIX) 20 MG tablet, Take 1 tablet (20 mg) by mouth every morning, Disp: 30 tablet, Rfl: 11     levETIRAcetam (KEPPRA) 250 MG tablet, Take 1 tablet (250 mg) by mouth 2 times daily, Disp: 60 tablet, Rfl: 1     loratadine (CLARITIN) 10 MG tablet, Take 1 tablet by mouth daily as needed for allergies.,  PT DAILY TREATMENT NOTE  Patient Name: Kate Montes Date:1/15/2019 : 1977 [x]  Patient  Verified Payor: Neri Ped / Plan: [address] [place] [address] Avenue / Product Type: PPO / In time:1030  Out time: 1110 Total Treatment Time (min):  40 min Visit #: 5 of 12 Treatment Area: Left hip pain [M25.552] SUBJECTIVE Pain Level (0-10 scale): 0 Any medication changes, allergies to medications, adverse drug reactions, diagnosis change, or new procedure performed?: [x] No    [] Yes (see summary sheet for update) Subjective functional status/changes:   [x] No changes reported OBJECTIVE 40 min Therapeutic Exercise:  [x] See flow sheet :  
Rationale: increase ROM, increase strength, improve coordination, improve balance and increase proprioception to improve the patients ability to return to PLOF full functional mobility With 
 [x] TE 
 [] TA 
 [] neuro 
 [] other: Patient Education: [x] Review HEP [] Progressed/Changed HEP based on:  
[] positioning   [] body mechanics   [] transfers   [] heat/ice application   
[] other: FOTO:  55 points Pain Level (0-10 scale) post treatment:0 
 
ASSESSMENT/Changes in Function:Performed all exercises well today;  Good neuromuscular control;  Progress dynamic balance. Patient will continue to benefit from skilled PT services to modify and progress therapeutic interventions, address functional mobility deficits, address ROM deficits, address strength deficits, analyze and address soft tissue restrictions, analyze and cue movement patterns, analyze and modify body mechanics/ergonomics, assess and modify postural abnormalities and address imbalance/dizziness to attain remaining goals. [x]  See Plan of Care 
[]  See progress note/recertification 
[]  See Discharge Summary Progress towards goals / Updated goals: 
Short Term Goals: To be accomplished in 3 weeks: "Disp: 30 tablet, Rfl: 6     mometasone-formoterol (DULERA) 200-5 MCG/ACT oral inhaler, Inhale 2 puffs into the lungs 2 times daily , Disp: , Rfl:      order for DME, Equipment being ordered: shower chair, Disp: 1 Device, Rfl: 0     order for DME, Equipment being ordered: Toilet seat riser, Disp: 1 Device, Rfl: 0     pantoprazole (PROTONIX) 40 MG EC tablet, TAKE ONE TABLET BY MOUTH EVERY MORNING BEFORE BREAKFAST, Disp: 30 tablet, Rfl: 11     PANTOPRAZOLE SODIUM PO, Take 40 mg by mouth every morning (before breakfast), Disp: , Rfl:      potassium chloride SA (K-DUR/KLOR-CON M) 10 MEQ CR tablet, Take 1 tablet (10 mEq) by mouth daily, Disp: 90 tablet, Rfl: 3     propranolol (INDERAL) 20 MG tablet, Take 1 tablet (20 mg) by mouth 2 times daily, Disp: 180 tablet, Rfl: 1     SODIUM BICARBONATE PO, Take 650 mg by mouth daily as needed , Disp: , Rfl:      tiotropium (SPIRIVA) 18 MCG capsule, Inhale 1 capsule into the lungs daily Using PRN, Disp: , Rfl:      traZODone (DESYREL) 50 MG tablet, Take 1 tablet (50 mg) by mouth At Bedtime, Disp: 90 tablet, Rfl: 3     VENTOLIN  (90 Base) MCG/ACT inhaler, INHALE 2 PUFFS INTO THE LUNGS EVERY 4 HOURS AS NEEDED, Disp: 18 g, Rfl: 11    Allergies -   Allergies   Allergen Reactions     Blood Transfusion Related (Informational Only)      Patient has a history of a clinically significant antibody against RBC antigens.  A delay in compatible RBCs may occur.     Famotidine Unknown and Other (See Comments)     Severe abdominal cramps     Cyclobenzaprine      hives     Gabapentin      Made pt's \"face break out\"     Methocarbamol      Made pt's \"face break out\"     Pepcid Cramps     Severe abdominal cramps     Vancomycin      Other reaction(s): Other  hallucinations     Vfend      IV     Hydrocodone-Acetaminophen Rash       Social History -   Social History     Socioeconomic History     Marital status: Single     Spouse name: None     Number of children: None     Years of education: None " 
  
Long 
 
Aldo Mccord DPT,OCS 1/15/2019  11:30 AM 
 
Future Appointments Date Time Provider Elliot Mcnair 1/17/2019 10:00 AM Nickie Garland PT [facility]  
1/22/2019 10:30 AM Georges Silva DPT [facility]  
1/24/2019  9:00 AM Nickie Garland PT [facility]  
1/29/2019 10:30 AM Georges Silva DPT [facility]  
1/31/2019 10:30 AM Georges Silva DPT [facility]  
2/5/2019 10:30 AM Georges Silva DPT [facility]  
2/7/2019 10:30 AM Nickie Garland PT [facility]  
 
 
 
 "    Highest education level: None   Social Needs     Financial resource strain: None     Food insecurity - worry: None     Food insecurity - inability: None     Transportation needs - medical: None     Transportation needs - non-medical: None   Occupational History     None   Tobacco Use     Smoking status: Current Every Day Smoker     Packs/day: 0.50     Years: 30.00     Pack years: 15.00     Types: Cigarettes     Smokeless tobacco: Never Used     Tobacco comment: I strongly emphasized the importance of quitting smoking. 4-5 daily   Substance and Sexual Activity     Alcohol use: Yes     Comment: Down to 3 beers per day.  Sometimes a cocktail also.     Drug use: Yes     Types: Marijuana     Sexual activity: None   Other Topics Concern     Parent/sibling w/ CABG, MI or angioplasty before 65F 55M? Not Asked   Social History Narrative     None       Family History -   Family History   Problem Relation Age of Onset     Hypertension Mother      Coronary Artery Disease Father         MI       Review of Systems - As per HPI and PMHx, otherwise 7 system review of the head and neck negative. 10+ system review negative.    Physical Exam  /72 (BP Location: Left arm, Patient Position: Sitting, Cuff Size: Adult Regular)   Pulse 78   Temp 98.4  F (36.9  C) (Oral)   Ht 1.651 m (5' 5\")   Wt 82.6 kg (182 lb)   BMI 30.29 kg/m     General - The patient is well nourished and well developed, and appears to have good nutritional status.  Alert and oriented to person and place, answers questions and cooperates with examination appropriately.   Head and Face - Normocephalic and atraumatic, with no gross asymmetry noted of the contour of the facial features.  The facial nerve is intact, with strong symmetric movements.  Voice and Breathing - The patient was breathing comfortably without the use of accessory muscles. There was no wheezing, stridor, or stertor.  The patients voice was clear and strong, and had appropriate pitch and " quality.  Ears - bilateral ear canals are clear. Bilateral TMs intact without effusion.    Audiogram 02/14/19 is essentially unchanged from 1 year ago.        Assessment - Abhijeet Mccauley is a 58 year old male with stable bilateral SNHL, with some left asymmetric loss. I believe he is doing well with hearing aids and given the stability of his hearing I do not think an MRI Brain is high yield. He is medically cleared to pursue new hearing aids. Return if there is any sudden change in hearing in the future.       Dr. Lennox Polanco MD  Otolaryngology  [facility]        Again, thank you for allowing me to participate in the care of your patient.        Sincerely,        Lennox Polanco MD

## 2019-02-14 NOTE — NURSING NOTE
"Initial /72 (BP Location: Left arm, Patient Position: Sitting, Cuff Size: Adult Regular)   Pulse 78   Temp 98.4  F (36.9  C) (Oral)   Ht 1.651 m (5' 5\")   Wt 82.6 kg (182 lb)   BMI 30.29 kg/m   Estimated body mass index is 30.29 kg/m  as calculated from the following:    Height as of this encounter: 1.651 m (5' 5\").    Weight as of this encounter: 82.6 kg (182 lb). .    Juana Delgado CMA    "

## 2019-02-18 ENCOUNTER — TELEPHONE (OUTPATIENT)
Dept: ADDICTION MEDICINE | Facility: CLINIC | Age: 59
End: 2019-02-18

## 2019-02-18 NOTE — TELEPHONE ENCOUNTER
Writer attempted to reach pt; no answer. LVM requesting a call back for an appt.  Two more attempts will be made.      Bennie Nicholson

## 2019-02-22 NOTE — TELEPHONE ENCOUNTER
Writer spoke with pt about scheduling an appt with Addiction Medicine pt declined due to lack of transportation.  Our addiction clinic only has 2 locations [place] [facility] [place]. Pt stated that he can only be seen at [facility]. Pt want to know what other options are there for him. Routing encounter to PCP to follow up with pt. Closing encounter, no further action need.       Bennie Nicholson     [facility]  [phone]

## 2019-02-25 ENCOUNTER — APPOINTMENT (OUTPATIENT)
Dept: CT IMAGING | Facility: CLINIC | Age: 59
End: 2019-02-25
Attending: EMERGENCY MEDICINE
Payer: COMMERCIAL

## 2019-02-25 ENCOUNTER — HOSPITAL ENCOUNTER (EMERGENCY)
Facility: CLINIC | Age: 59
Discharge: HOME | End: 2019-02-25
Attending: EMERGENCY MEDICINE | Admitting: EMERGENCY MEDICINE
Payer: COMMERCIAL

## 2019-02-25 ENCOUNTER — NURSE TRIAGE (OUTPATIENT)
Dept: NURSING | Facility: CLINIC | Age: 59
End: 2019-02-25

## 2019-02-25 VITALS
OXYGEN SATURATION: 99 % | DIASTOLIC BLOOD PRESSURE: 83 MMHG | RESPIRATION RATE: 20 BRPM | SYSTOLIC BLOOD PRESSURE: 153 MMHG | HEART RATE: 73 BPM | TEMPERATURE: 98.1 F

## 2019-02-25 DIAGNOSIS — R07.89 CHEST WALL PAIN: ICD-10-CM

## 2019-02-25 DIAGNOSIS — M54.6 ACUTE BILATERAL THORACIC BACK PAIN: ICD-10-CM

## 2019-02-25 DIAGNOSIS — M79.604 LEG PAIN, BILATERAL: ICD-10-CM
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
## 2019-02-25 DIAGNOSIS — M79.605 LEG PAIN, BILATERAL: ICD-10-CM

## 2019-02-25 LAB
ANION GAP SERPL CALCULATED.3IONS-SCNC: 7 MMOL/L (ref 3–14)
BASOPHILS # BLD AUTO: 0.1 10E9/L (ref 0–0.2)
BASOPHILS NFR BLD AUTO: 0.9 %
BUN SERPL-MCNC: 29 MG/DL (ref 7–30)
CALCIUM SERPL-MCNC: 9.1 MG/DL (ref 8.5–10.1)
CHLORIDE SERPL-SCNC: 104 MMOL/L (ref 94–109)
CO2 SERPL-SCNC: 27 MMOL/L (ref 20–32)
CREAT SERPL-MCNC: 1.93 MG/DL (ref 0.66–1.25)
DIFFERENTIAL METHOD BLD: ABNORMAL
EOSINOPHIL # BLD AUTO: 0.2 10E9/L (ref 0–0.7)
EOSINOPHIL NFR BLD AUTO: 2.3 %
ERYTHROCYTE [DISTWIDTH] IN BLOOD BY AUTOMATED COUNT: 19.9 % (ref 10–15)
GFR SERPL CREATININE-BSD FRML MDRD: 37 ML/MIN/{1.73_M2}
GLUCOSE SERPL-MCNC: 93 MG/DL (ref 70–99)
HCT VFR BLD AUTO: 26.9 % (ref 40–53)
HGB BLD-MCNC: 8.2 G/DL (ref 13.3–17.7)
IMM GRANULOCYTES # BLD: 0.1 10E9/L (ref 0–0.4)
IMM GRANULOCYTES NFR BLD: 0.9 %
LYMPHOCYTES # BLD AUTO: 1.7 10E9/L (ref 0.8–5.3)
LYMPHOCYTES NFR BLD AUTO: 21.6 %
MCH RBC QN AUTO: 27.1 PG (ref 26.5–33)
MCHC RBC AUTO-ENTMCNC: 30.5 G/DL (ref 31.5–36.5)
MCV RBC AUTO: 89 FL (ref 78–100)
MONOCYTES # BLD AUTO: 1.2 10E9/L (ref 0–1.3)
MONOCYTES NFR BLD AUTO: 16.1 %
NEUTROPHILS # BLD AUTO: 4.5 10E9/L (ref 1.6–8.3)
NEUTROPHILS NFR BLD AUTO: 58.2 %
NRBC # BLD AUTO: 0 10*3/UL
NRBC BLD AUTO-RTO: 0 /100
PLATELET # BLD AUTO: 131 10E9/L (ref 150–450)
POTASSIUM SERPL-SCNC: 4.3 MMOL/L (ref 3.4–5.3)
RBC # BLD AUTO: 3.03 10E12/L (ref 4.4–5.9)
SODIUM SERPL-SCNC: 138 MMOL/L (ref 133–144)
WBC # BLD AUTO: 7.7 10E9/L (ref 4–11)

## 2019-02-25 PROCEDURE — 72125 CT NECK SPINE W/O DYE: CPT

## 2019-02-25 PROCEDURE — 25000128 H RX IP 250 OP 636: Performed by: EMERGENCY MEDICINE

## 2019-02-25 PROCEDURE — 85025 COMPLETE CBC W/AUTO DIFF WBC: CPT | Performed by: EMERGENCY MEDICINE

## 2019-02-25 PROCEDURE — 70450 CT HEAD/BRAIN W/O DYE: CPT

## 2019-02-25 PROCEDURE — 99284 EMERGENCY DEPT VISIT MOD MDM: CPT | Mod: Z6 | Performed by: EMERGENCY MEDICINE

## 2019-02-25 PROCEDURE — 72131 CT LUMBAR SPINE W/O DYE: CPT

## 2019-02-25 PROCEDURE — 71260 CT THORAX DX C+: CPT

## 2019-02-25 PROCEDURE — 72128 CT CHEST SPINE W/O DYE: CPT

## 2019-02-25 PROCEDURE — 25000125 ZZHC RX 250: Performed by: EMERGENCY MEDICINE

## 2019-02-25 PROCEDURE — 80048 BASIC METABOLIC PNL TOTAL CA: CPT | Performed by: EMERGENCY MEDICINE

## 2019-02-25 PROCEDURE — 99284 EMERGENCY DEPT VISIT MOD MDM: CPT | Mod: 25

## 2019-02-25 RX ORDER — IOPAMIDOL 755 MG/ML
80 INJECTION, SOLUTION INTRAVASCULAR ONCE
Status: COMPLETED | OUTPATIENT
Start: 2019-02-25 | End: 2019-02-25

## 2019-02-25 RX ORDER — OXYCODONE AND ACETAMINOPHEN 5; 325 MG/1; MG/1
1-2 TABLET ORAL EVERY 4 HOURS PRN
Qty: 8 TABLET | Refills: 0 | Status: SHIPPED | OUTPATIENT
Start: 2019-02-25 | End: 2019-03-15

## 2019-02-25 RX ORDER — OXYCODONE AND ACETAMINOPHEN 5; 325 MG/1; MG/1
2 TABLET ORAL ONCE
Status: DISCONTINUED | OUTPATIENT
Start: 2019-02-25 | End: 2019-02-25 | Stop reason: HOSPADM

## 2019-02-25 RX ADMIN — SODIUM CHLORIDE 80 ML: 9 INJECTION, SOLUTION INTRAVENOUS at 14:10

## 2019-02-25 RX ADMIN — IOPAMIDOL 80 ML: 755 INJECTION, SOLUTION INTRAVENOUS at 14:10

## 2019-02-25 ASSESSMENT — ENCOUNTER SYMPTOMS
FEVER: 0
SHORTNESS OF BREATH: 0
ABDOMINAL PAIN: 0

## 2019-02-25 NOTE — ED PROVIDER NOTES
"  History     Chief Complaint   Patient presents with     Fall     2 feet off a ladder in the garage.      HPI  Abhijeet Mccauley is a 58 year old male who has past medical history significant for COPD, alcohol abuse, presenting to the emergency department via EMS after patient had a fall from a ladder 2 days ago.  He states it was approximately 2 feet up on the ladder, in his garage, when he subsequently fell backwards, landing on his back, and side.  Patient has not been seen, or evaluated with any healthcare provider since the fall.  He  states that he perhaps lost consciousness for a short duration of time.  He denies any blood thinning medication use.  Patient states that since the fall 2 days ago he has had increased amounts of back pain, neck pain, slight amounts of headache, in addition to bilateral lower extremity pains.  Patient has been taking occasional Tylenol without alleviation of the constant pain.  He does see pain clinic in 2 days.  Denies any nausea, or vomiting.  He does have chronic lower extremity numbness, and tingling, which has not significantly changed, however does have increased amounts of pain of the lower extremities.    Allergies:  Allergies   Allergen Reactions     Blood Transfusion Related (Informational Only)      Patient has a history of a clinically significant antibody against RBC antigens.  A delay in compatible RBCs may occur.     Famotidine Unknown and Other (See Comments)     Severe abdominal cramps     Cyclobenzaprine      hives     Gabapentin      Made pt's \"face break out\"     Methocarbamol      Made pt's \"face break out\"     Pepcid Cramps     Severe abdominal cramps     Vancomycin      Other reaction(s): Other  hallucinations     Vfend      IV     Hydrocodone-Acetaminophen Rash       Problem List:    Patient Active Problem List    Diagnosis Date Noted     COPD exacerbation (H) 01/02/2019     Priority: Medium     Normocytic anemia 05/17/2018     Priority: Medium     " Leukocytosis with increased monocytes 05/17/2018     Priority: Medium     Generalized weakness 05/17/2018     Priority: Medium     Tubular adenoma of colon 03/23/2018     Priority: Medium     Collected: 3/18/2018   Received: 3/19/2018   Reported: 3/22/2018 19:19   Ordering Phy(s): SASKIA LEE     For improved result formatting, select 'View Enhanced Report Format' under    Linked Documents section.     SPECIMEN(S):   A: Splenic flexure polyp   B: Rectal polyp     FINAL DIAGNOSIS:   A.  Colon, splenic flexure, mucosal biopsies:   - Tubular adenoma.   - Negative for high-grade dysplasia and malignancy.     B.  Rectum, mucosal biopsy:   - Tubular adenoma.   - Negative for high-grade dysplasia and malignancy.        Peptic ulcer disease 03/16/2018     Priority: Medium     Uncomplicated alcohol dependence (H) 03/16/2018     Priority: Medium     Tobacco dependence syndrome 03/16/2018     Priority: Medium     Mild intermittent asthma without complication 11/13/2017     Priority: Medium     CKD (chronic kidney disease) stage 3, GFR 30-59 ml/min (H) 08/16/2017     Priority: Medium     Rosacea 08/16/2017     Priority: Medium     Sensorineural hearing loss, asymmetrical 03/28/2017     Priority: Medium     Thrombocytopenia (H) 11/11/2014     Priority: Medium     Universal ulcerative (chronic) colitis(556.6) (H) 11/11/2014     Priority: Medium     Alcoholic cirrhosis of liver (H) 11/04/2014     Priority: Medium     Coagulation defect (H) 11/04/2014     Priority: Medium     Acute alcoholic hepatitis 10/22/2014     Priority: Medium     Osteoarthrosis 02/21/2014     Priority: Medium     Essential hypertension 03/28/2011     Priority: Medium     Problem list name updated by automated process. Provider to review       Acute myeloid leukemia in remission (H) 03/28/2011     Priority: Medium     S/p chemo, did not need bone marrow transplant          Past Medical History:    Past Medical History:   Diagnosis Date     Alcohol dependence  (H)      Alcoholic cirrhosis of liver (H)      AML (acute myeloid leukemia) in remission (H)      Chronic obstructive pulmonary disease 5/17/2018     COPD (chronic obstructive pulmonary disease) (H)      History of pulmonary embolus (PE)      Hypertension      PUD (peptic ulcer disease)      Tobacco dependence      Ulcerative colitis (H)        Past Surgical History:    Past Surgical History:   Procedure Laterality Date     AS TOTAL KNEE ARTHROPLASTY Left      BONE MARROW BIOPSY, BONE SPECIMEN, NEEDLE/TROCAR N/A 6/12/2018    Procedure: BIOPSY BONE MARROW;  Bone Marrow Biopsy;  Surgeon: Demar Sapp MD;  Location: [facility]     ESOPHAGOSCOPY, GASTROSCOPY, DUODENOSCOPY (EGD), COMBINED N/A 2/8/2018    Procedure: COMBINED ESOPHAGOSCOPY, GASTROSCOPY, DUODENOSCOPY (EGD), BIOPSY SINGLE OR MULTIPLE;  gastroscopy with biopsies;  Surgeon: Raz Cobb MD;  Location: [facility]     ESOPHAGOSCOPY, GASTROSCOPY, DUODENOSCOPY (EGD), COMBINED N/A 3/18/2018    Procedure: COMBINED ESOPHAGOSCOPY, GASTROSCOPY, DUODENOSCOPY (EGD);;  Surgeon: Raz Cobb MD;  Location: [facility]     LACERATION REPAIR Right     Right leg       Family History:    Family History   Problem Relation Age of Onset     Hypertension Mother      Coronary Artery Disease Father         MI       Social History:  Marital Status:  Single [1]  Social History     Tobacco Use     Smoking status: Current Every Day Smoker     Packs/day: 0.50     Years: 30.00     Pack years: 15.00     Types: Cigarettes     Smokeless tobacco: Never Used     Tobacco comment: I strongly emphasized the importance of quitting smoking. 4-5 daily   Substance Use Topics     Alcohol use: Yes     Comment: Down to 3 beers per day.  Sometimes a cocktail also.     Drug use: Yes     Types: Marijuana        Medications:      oxyCODONE-acetaminophen (PERCOCET) 5-325 MG tablet   acetaminophen (TYLENOL) 500 MG tablet   baclofen (LIORESAL) 10 MG tablet   desonide (DESOWEN) 0.05 % external lotion    furosemide (LASIX) 20 MG tablet   levETIRAcetam (KEPPRA) 250 MG tablet   loratadine (CLARITIN) 10 MG tablet   mometasone-formoterol (DULERA) 200-5 MCG/ACT oral inhaler   order for DME   order for DME   pantoprazole (PROTONIX) 40 MG EC tablet   PANTOPRAZOLE SODIUM PO   potassium chloride SA (K-DUR/KLOR-CON M) 10 MEQ CR tablet   propranolol (INDERAL) 20 MG tablet   SODIUM BICARBONATE PO   tiotropium (SPIRIVA) 18 MCG capsule   traZODone (DESYREL) 50 MG tablet   VENTOLIN  (90 Base) MCG/ACT inhaler         Review of Systems   Constitutional: Negative for fever.   Respiratory: Negative for shortness of breath.    Cardiovascular: Negative for chest pain.   Gastrointestinal: Negative for abdominal pain.   Musculoskeletal:        Leg pains   All other systems reviewed and are negative.      Physical Exam   BP: 141/69  Pulse: 74  Temp: 98.1  F (36.7  C)  Resp: 20  SpO2: 99 %      Physical Exam  /69   Pulse 74   Temp 98.1  F (36.7  C) (Oral)   Resp 20   SpO2 99%   General: alert, interactive, in no apparent distress, lying in bed  Head: atraumatic  Nose: no rhinorrhea or epistaxis  Ears: no external auditory canal discharge or bleeding.    Eyes: Sclera nonicteric. Conjunctiva noninjected. PERRL, EOMI  Mouth: no tonsillar erythema, edema, or exudate  Neck: supple, no palp LAD  Lungs: CTAB  Back: Tenderness to palpation of the left mid thoracic region chest wall over the lateral aspect.  Also with mid to lower mid spine tenderness to palpation.  He has bruising of the right lateral chest wall..  CV: RRR, S1/S2; peripheral pulses palpable and symmetric  Abdomen: soft, nt, nd, no guarding or rebound. Positive bowel sounds  Extremities: no cyanosis or edema tenderness which seems somewhat out of proportion to palpation of the shin regions bilaterally.  No rash.  Skin: no rash or diaphoresis  Neuro: CN III-XII grossly intact, strength 5/5 in UE and LEs bilaterally, sensation intact to light touch in UE and LEs  bilaterally;       ED Course        Procedures               Critical Care time:  none               Results for orders placed or performed during the hospital encounter of 02/25/19 (from the past 24 hour(s))   CBC with platelets differential   Result Value Ref Range    WBC 7.7 4.0 - 11.0 10e9/L    RBC Count 3.03 (L) 4.4 - 5.9 10e12/L    Hemoglobin 8.2 (L) 13.3 - 17.7 g/dL    Hematocrit 26.9 (L) 40.0 - 53.0 %    MCV 89 78 - 100 fl    MCH 27.1 26.5 - 33.0 pg    MCHC 30.5 (L) 31.5 - 36.5 g/dL    RDW 19.9 (H) 10.0 - 15.0 %    Platelet Count 131 (L) 150 - 450 10e9/L    Diff Method Automated Method     % Neutrophils 58.2 %    % Lymphocytes 21.6 %    % Monocytes 16.1 %    % Eosinophils 2.3 %    % Basophils 0.9 %    % Immature Granulocytes 0.9 %    Nucleated RBCs 0 0 /100    Absolute Neutrophil 4.5 1.6 - 8.3 10e9/L    Absolute Lymphocytes 1.7 0.8 - 5.3 10e9/L    Absolute Monocytes 1.2 0.0 - 1.3 10e9/L    Absolute Eosinophils 0.2 0.0 - 0.7 10e9/L    Absolute Basophils 0.1 0.0 - 0.2 10e9/L    Abs Immature Granulocytes 0.1 0 - 0.4 10e9/L    Absolute Nucleated RBC 0.0    Basic metabolic panel   Result Value Ref Range    Sodium 138 133 - 144 mmol/L    Potassium 4.3 3.4 - 5.3 mmol/L    Chloride 104 94 - 109 mmol/L    Carbon Dioxide 27 20 - 32 mmol/L    Anion Gap 7 3 - 14 mmol/L    Glucose 93 70 - 99 mg/dL    Urea Nitrogen 29 7 - 30 mg/dL    Creatinine 1.93 (H) 0.66 - 1.25 mg/dL    GFR Estimate 37 (L) >60 mL/min/[1.73_m2]    GFR Estimate If Black 43 (L) >60 mL/min/[1.73_m2]    Calcium 9.1 8.5 - 10.1 mg/dL   CT Head w/o Contrast    Narrative    CT SCAN OF THE HEAD WITHOUT CONTRAST   2/25/2019 2:02 PM     HISTORY:  Headache, post traumatic.    TECHNIQUE:  Axial images of the head and coronal reformations without  IV contrast material.  Radiation dose for this scan was reduced using  automated exposure control, adjustment of the mA and/or kV according  to patient size, or iterative reconstruction technique.    COMPARISON:  None.    FINDINGS: Cerebral atrophy is present. There is a small focal fairly  well marginated lesion in the left frontal bone which is felt to be  due to a benign hemangioma. The calvarium is intact. There is no  evidence for intracranial hemorrhage, mass effect, acute infarct, or  skull fracture. Vascular calcifications noted.      Impression    IMPRESSION: Cerebral atrophy. No evidence for intracranial hemorrhage  or any acute process.    ABY WADDELL MD   CT Cervical Spine w/o Contrast    Narrative    CT OF THE CERVICAL SPINE WITHOUT CONTRAST   2/25/2019 2:03 PM     COMPARISON: 8/7/2018 cervical spine MRI.    HISTORY: C-spine trauma, high clinical risk (NEXUS/CCR).     TECHNIQUE: Axial images of the cervical spine were acquired without  intravenous contrast. Multiplanar reformations were created.      FINDINGS: The cervical spine is imaged through C6-C7 disc level on the  sagittal series. The vertebral body heights are preserved. There is  0.2 cm degenerative anterolisthesis of C2 on C3 related to facet  arthrosis. The prevertebral soft tissues are unremarkable. No  posterior paraspinous intramuscular hematoma or fat stranding. There  is multilevel degenerative disc disease characterized by disc height  loss, ventral and dorsal osteophytes and facet arthrosis. No facet  offset or malalignment.    Limited evaluation of the cervical soft tissues is normal.    C2-C3: Left uncal spurring and facet arthrosis. No spinal canal  foraminal stenosis.    C3-C4: Bilateral uncal spurring and right facet hypertrophy. Severe  proximal right and mild-to-moderate left foraminal stenosis.    C4-C5: Posterior disc bulge osteophytes, bilateral uncal spurring and  facet hypertrophy. Probable severe right foraminal stenosis.  Mild-to-moderate left foraminal stenosis.    C5-C6: Severe right foraminal stenosis and moderate left foraminal  stenosis.    C6-C7: Severe right foraminal stenosis and at least moderate, if not  greater, left  foraminal stenosis.      Impression    IMPRESSION:  1. No acute cervical spine abnormality.  2. Multilevel cervical spondylosis as described.    PROSPER THOMAS MD   CT Thoracic Spine w/o Contrast    Narrative    CT OF THE THORACIC SPINE WITHOUT CONTRAST February 25, 2019 2:04 PM     HISTORY: Polytrauma, critical, thoracolumbar spine injury suspected.  Fall.     TECHNIQUE: Axial CT images of the thoracic spine were acquired without  intravenous contrast. Multiplanar reformations were created from the  axial source images. Radiation dose for this scan was reduced using  automated exposure control, adjustment of the mA and/or kV according  to patient size, or iterative reconstruction technique.    COMPARISON: 4/24/2009 thoracic spine MRI.    FINDINGS: Stable vertebral body heights and alignment from the  comparison examination. Dudley ventral osteophytes in the mid and  lower thoracic spine on the right side. Focal calcification of the  posterior longitudinal ligament at T6 level. No costovertebral or  facet ankylosis. No facet/malalignment. No prevertebral or posterior  paraspinal soft tissue abnormality.      Impression    IMPRESSION:  1. No acute thoracic spine abnormality.  2. Diffuse idiopathic skeletal hyperostosis.    PROSPER THOMAS MD   CT Lumbar Spine w/o Contrast    Narrative    CT LUMBAR SPINE W/O CONTRAST 2/25/2019 2:05 PM    HISTORY: Back pain after falling from a ladder.     TECHNIQUE: Helical scans obtained from mid T12 through the upper  sacrum using soft tissue and bone windows. Sagittal reconstructions  were also obtained. Radiation dose for this scan was reduced using  automated exposure control, adjustment of the mA and/or kV according  to patient size, or iterative reconstruction technique.    COMPARISON: MRI lumbar spine 8/7/2018.    FINDINGS: Five functional lumbar vertebral segments are present.  Chronic wedge compression deformity of L1 and/or prominent superior  and inferior Schmorl's  nodes without change. No retropulsion of bony  elements. No acute appearing bony abnormality. Minimal degenerative  retrolisthesis L2 on L3 and L3 on L4 and minimal degenerative  anterolisthesis L4 on L5, all without change.      T12-L1: Posterior disc space narrowing and degenerative intradiscal  gas with mild disc bulging and no acute disc protrusion or significant  central or foraminal stenosis. Posterior facets are unremarkable. No  change.    L1-L2: Posterior disc space narrowing and minimal disc bulging without  disc protrusion or significant central or foraminal stenosis. No  change.    L2-L3: Minimal posterior disc space narrowing and mild diffuse disc  bulging superimposed on a congenitally small bony canal resulting in  mild central stenosis. No significant foraminal narrowing. Posterior  facets are unremarkable. No change.    L3-L4: No significant disc space narrowing. Mild disc bulging without  acute disc protrusion and no significant central stenosis. Very mild  bilateral foraminal stenosis. Posterior facets are unremarkable. No  change.     L4-L5: No disc space narrowing. Moderate diffuse disc bulging with  posterior facet hypertrophic degenerative changes, minimal anterior  listhesis and thickening of the ligamentum flavum resulting in at  least mild central stenosis which has increased since the prior MRI.  There is increasing moderate left foraminal stenosis and stable mild  right foraminal stenosis.    L5-S1: Mild posterior disc space narrowing and diffuse disc bulging  without acute disc protrusion or central stenosis. No significant  foraminal narrowing. Posterior facets are unremarkable. No change.    Additional findings: Extensive vascular calcifications are seen.  Paraspinal soft tissues are otherwise unremarkable.      Impression    IMPRESSION:   1. No acute appearing bony abnormality. There is a chronic wedge  compression deformity at L1.  2. Multilevel degenerative disc disease is similar  to the prior MRI  study of 8/7/2018. There has been some increase in mild central  stenosis and left foraminal stenosis at the L4-L5 level since the  prior MRI.   CT Chest w Contrast    Narrative    CT CHEST WITH CONTRAST   2/25/2019 2:10 PM     HISTORY: Rib fracture suspected, post fall from ladder.  Mid back  pain. Left posterior rib pain with bruising also of right lateral  side.    TECHNIQUE:  80 mL Isovue 370. Radiation dose for this scan was reduced  using automated exposure control, adjustment of the mA and/or kV  according to patient size, or iterative reconstruction technique.    COMPARISON: None.    FINDINGS:  Mediastinum is normal. No adenopathy or fluid. No evidence  of aortic aneurysm or dissection. No pericardial fluid. Small  calcified granuloma left upper lobe, lungs are otherwise clear. No  pleural fluid or pneumothorax.    Scans through the upper abdomen demonstrate a somewhat small liver  with undulating margins. Recommend clinical correlation for cirrhosis.  There is a single gallstone seen. Spleen and pancreas are  unremarkable. There appears to be a large left upper pole kidney stone  with likely left hydronephrosis as well.      Impression    IMPRESSION:  1. No pleural fluid or pneumothorax. Lungs are clear.  2. Single gallstone identified without pericholecystic fluid.  3. Large stone in the upper pole of the left kidney with probable  hydronephrosis. This is only partially visualized. On a CT scan from  1/30/2009 there was a large left upper pole stone as well.       Medications   oxyCODONE-acetaminophen (PERCOCET) 5-325 MG per tablet 2 tablet (not administered)   sodium chloride 0.9 % bag 500mL for CT scan flush use (80 mLs Intravenous Given 2/25/19 1410)   iopamidol (ISOVUE-370) solution 80 mL (80 mLs Intravenous Given 2/25/19 1410)       Assessments & Plan (with Medical Decision Making)  58 year old male, presenting to the emergency department with so I do not know EMS after patient had a  fall 2 days ago from a ladder.  Patient has not been seen by healthcare provider since the fall.  Patient states he perhaps lost consciousness for a short duration of time.  He has had increased amounts of diffuse pain throughout the body, however notably in the neck, chest wall, and back.  Also bilateral lower extremity pains.  Based on the fall, with history of alcohol abuse, however patient states he has not had alcohol for approximately 1 week, multiple imaging studies including CT of the head, C-spine, thoracic and lumbar spine, in addition to the chest are performed.  He has no tenderness of the abdomen, I do not feel that abdominal CT is indicated.    Imaging results are reviewed as shown above, with no evidence of acute fracture, or other acute posttraumatic injury which is identified.  Patient was given 2 tablets of Percocet in the emergency department, and will be sent home with a total tablets.  Follow-up has been recommended with primary care provider.  He also has pain clinic visit later this week.     I have reviewed the nursing notes.    I have reviewed the findings, diagnosis, plan and need for follow up with the patient.          Medication List      Started    oxyCODONE-acetaminophen 5-325 MG tablet  Commonly known as:  PERCOCET  1-2 tablets, Oral, EVERY 4 HOURS PRN            Final diagnoses:   Fall, initial encounter   Chest wall pain   Acute bilateral thoracic back pain   Leg pain, bilateral       2/25/2019   [facility] EMERGENCY DEPARTMENT     Parrish Billy MD  02/25/19 [id number]

## 2019-02-25 NOTE — TELEPHONE ENCOUNTER
Mother Sharon calling to find out if Abhijeet was seen in ED today ?  Consent to communicate on file, advised he was seen and discharged at approx 15:30.        Additional Information    [1] Follow-up call to recent contact AND [2] information only call, no triage required    Protocols used: INFORMATION ONLY CALL-ADULT-

## 2019-02-25 NOTE — ED AVS SNAPSHOT
[facility] Emergency Department  [address] [facility] [address]  Phone:  [phone]  Fax:  [phone]                                    Abhijeet Mccauley   MRN: [id number]    Department:  [facility] Emergency Department   Date of Visit:  2/25/2019           After Visit Summary Signature Page    I have received my discharge instructions, and my questions have been answered. I have discussed any challenges I see with this plan with the nurse or doctor.    ..........................................................................................................................................  Patient/Patient Representative Signature      ..........................................................................................................................................  Patient Representative Print Name and Relationship to Patient    ..................................................               ................................................  Date                                   Time    ..........................................................................................................................................  Reviewed by Signature/Title    ...................................................              ..............................................  Date                                               Time          22EPIC Rev 08/18

## 2019-02-26 ENCOUNTER — OFFICE VISIT (OUTPATIENT)
Dept: AUDIOLOGY | Facility: CLINIC | Age: 59
End: 2019-02-26
Payer: COMMERCIAL

## 2019-02-26 ENCOUNTER — HOSPITAL ENCOUNTER (OUTPATIENT)
Dept: PHYSICAL THERAPY | Facility: CLINIC | Age: 59
Setting detail: THERAPIES SERIES
End: 2019-02-26
Attending: ANESTHESIOLOGY
Payer: COMMERCIAL

## 2019-02-26 DIAGNOSIS — H90.3 SENSORINEURAL HEARING LOSS, ASYMMETRICAL: Primary | ICD-10-CM

## 2019-02-26 PROCEDURE — 97162 PT EVAL MOD COMPLEX 30 MIN: CPT | Mod: GP | Performed by: PHYSICAL THERAPIST

## 2019-02-26 PROCEDURE — 92591 HC HEARING AID EXAM BINAURAL: CPT | Performed by: AUDIOLOGIST

## 2019-02-26 PROCEDURE — 97110 THERAPEUTIC EXERCISES: CPT | Mod: GP | Performed by: PHYSICAL THERAPIST

## 2019-02-26 PROCEDURE — 99207 ZZC NO CHARGE LOS: CPT | Performed by: AUDIOLOGIST

## 2019-02-26 NOTE — PROGRESS NOTES
AUDIOLOGY REPORT    SUBJECTIVE: Abhijeet Mccauley is a 58 year old male was seen in the Audiology Clinic at  [facility] on 2/26/19 to discuss concerns with hearing and functional communication difficulties. The patient was accompanied by their self. Abhijeet has been seen previously on 2/14/2019, and results revealed a asymmetrical sensorineural hearing loss bilaterally.  The patient was medically evaluated and determined to be cleared for binaural hearing aids by Dr. Lennox Polanco. Abhijeet notes difficulty with communication in a variety of listening situations.Abhijeet is an experienced hearing aid user.    OBJECTIVE:  Patient is a hearing aid candidate. Patient would like to move forward with a hearing aid evaluation today. Therefore, the patient was presented with different options for amplification to help aid in communication. Discussed styles, levels of technology and monaural vs. binaural fitting.     The hearing aid(s) mutually chosen were:  Binaural: Phonak Bolero [id number]  COLOR: P7  Graphite gray  BATTERY SIZE: Rechargable  EARMOLD/TIPS: skeleton      Otoscopy revealed ears are clear of cerumen bilaterally. Bilateral earmolds were taken without incident.    ASSESSMENT:   No diagnosis found.    Reviewed purchase information and warranty information with patient. The 45 day trial period was explained to patient. The patient was given a copy of the [facility] consumer brochure on purchasing hearing instruments. The risk factors are also available in the User Instructional Booklet to be presented on the day of the hearing aid fitting. Hearing aid(s) ordered. Hearing aid evaluation completed.    PLAN: Abhijeet is scheduled to return in 2-3 weeks for a hearing aid fitting and programming. Purchase agreement will be completed on that date. Please contact this clinic with any questions or concerns.      Ai KUNZ, #[id number]

## 2019-02-26 NOTE — PROGRESS NOTES
02/26/19 1500   General Information   Type of Visit Initial OP Ortho PT Evaluation   Start of Care Date 02/26/19   Referring Physician Landen Quiñones MD   Patient/Family Goals Statement Make me feel better so I can walk   Orders Evaluate and Treat   Date of Order 02/13/19   Insurance Type [employer]   Medical Diagnosis neck pain, LBP, hip pain   Body Part(s)   Body Part(s) Cervical Spine;Lumbar Spine/SI   Presentation and Etiology   Pertinent history of current problem (include personal factors and/or comorbidities that impact the POC) Pt presents w/ 10 year hx of neck and back pain.   Pt notes it started while he had leukemia--was in a coma X 6 weeks.   Neck pain @ best 6/10,  @ worst 10/10. Pt notes the pain goes from his neck all the way down the to the LB.  Pt notes constant numbness in both hands (onset past 1-2 months).  Pt notes HA  daily lasting a couple hours at base of skull/ neck.  LBP  @ best 7/10,  @ worst 10/10.  Pain goes down B LE into feet equally.   Pt notes B foot numbness constant.   No recent bowel / bladder changes.   Pt notes weakness in his  legs stating he can hardly stand on them  in past 6 months.  Meds: tylenol.  No recent injections--has had previously w/ some benefit.   CT scans in ER 2/25/19 due to a fall from 2nd rung of ladder--in chart: deg changes/ old compression fx.   PMHX:  L TKA and notes it needs to be redone (wearing knee brace).  alcohol problems,  leukemia,  smoker.  Moderate: comorbidities   Impairments A. Pain;B. Decreased WB tolerance;C. Swelling;D. Decreased ROM;G. Impaired balance;H. Impaired gait;K. Numbness;L. Tingling;M. Locking or catching;N. Headaches;O. Blurred vision;Q. Dizziness   Pain quality A. Sharp;C. Aching;D. Burning;F. Stabbing;E. Shooting   Pain exacerbation comment Walking needs to take frequent breaks--uses cane.    Siting tolerance a couple hours.  Standing tolerance 10 min.  Waking 1-2X/night (takes trazadone to go to sleep).      Pain/symptoms  eased by A. Sitting;B. Walking;E. Changing positions;F. Certain positions;J. Braces/supports   Progression of symptoms since onset: Worsened  (pain is more intense and he has more numbness.)   Prior Level of Function   Functional Level Prior Comment takes care of 5 acres and 10 gardens.  Shovels snow.  No formal exercise.  Pt has membership to [employer] but has no transportation.     Current Level of Function   Patient role/employment history F. Retired;G. Disabled   Fall Risk Screen   Fall screen completed by PT   Have you fallen 2 or more times in the past year? Yes   Have you fallen and had an injury in the past year? No   Timed Up and Go score (seconds) 21.8   Is patient a fall risk? Yes;Department fall risk interventions implemented   Lumbar Spine/SI Objective Findings   Posture Slight FH,  Decreased lumbar lordosis.   PSIS/ crest level.     Gait/Locomotion Mild + limp w/ knee brace on and SEC.   Wider base of support.     Flexion ROM 75% but then started loosing his balance.     Right Side Bending ROM 50%   Left Side Bending ROM 50%   Hip Screen PROM ER R 28*, L 33*;  IR R 26*,  L 20*   Hip Flexion (L2) Strength B 4/5   Knee Flexion Strength B 5-/5   Knee Extension (L3) Strength B 5/5   Ankle Dorsiflexion (L4) Strength B 4+/5   Cervical Spine   Cervical Flexion ROM WFL   Cervical Extension ROM WFL   Cervical Right Rotation ROM WFl   Cervical Left Rotation ROM WFl   Shoulder AROM Screen B WFL notes it is painful.     Shoulder/Wrist/Hand Strength Comments B shoulder flex/ abd 5/5,  ER R 4+/5, L 5-/5,  IR B 5-/5.  Biceps / triceps B 5/5.   Wrist flex/ ext B 4/5.   Alar Ligament Test neg   Spurling Test just tipping back increased complaints   Palpation Moderate to severe pain w/ palpation neck to LB.   Planned Therapy Interventions   Planned Therapy Interventions balance training;neuromuscular re-education;strengthening;stretching   Planned Modality Interventions   Planned Modality Interventions TENS;Electrical  stimulation   Clinical Impression   Criteria for Skilled Therapeutic Interventions Met yes, treatment indicated   PT Diagnosis chronic neck /LB and hip pain   Influenced by the following impairments pain, stiffness, decreased strength, numbness   Functional limitations due to impairments walking, standing,  transitions   Clinical Presentation Evolving/Changing   Clinical Presentation Rationale Pt notes worsening symptoms and onset of numbness   Clinical Decision Making (Complexity) Moderate complexity   Therapy Frequency 1 time/week   Predicted Duration of Therapy Intervention (days/wks) 8 weeks   Risk & Benefits of therapy have been explained Yes   Patient, Family & other staff in agreement with plan of care Yes   Education Assessment   Barriers to Learning Hearing  (pt forgot to wear his hearing aides today)   Ortho Goal 1   Goal Description 1.  Pt will be able to walk X 15 min w/ maximum of 2 breaks   Target Date 04/27/19   Ortho Goal 2   Goal Description 2.  Pt will tolerate standing 15-20 min for ADL's/ basic house tasks   Target Date 04/27/19   Ortho Goal 3   Goal Description 3.  Pt will have improved LE strength in order to transistion sit<>stand w/ decreased reliance on UE    Target Date 04/27/19   Ortho Goal 4   Goal Description 4. Pt will be independent and consistent w/ HEP   Target Date 04/27/19   Total Evaluation Time   PT Sophie, Moderate Complexity Minutes (99443) 40     Thank you for this referral,    Bhakti Nava, PT,  CEAS   #[id number]  [facility]  [phone]

## 2019-03-01 ENCOUNTER — TELEPHONE (OUTPATIENT)
Dept: FAMILY MEDICINE | Facility: CLINIC | Age: 59
End: 2019-03-01

## 2019-03-01 NOTE — TELEPHONE ENCOUNTER
Reason for Call:  Other prescription    Detailed comments: pt was in ED on 2/25 for back pain and is still having this pain and no medication. Please advise    Phone Number Patient can be reached at: Home number on file [phone] (home)    Best Time: any    Can we leave a detailed message on this number? YES    Call taken on 3/1/2019 at 8:20 AM by Do Tang

## 2019-03-03 NOTE — TELEPHONE ENCOUNTER
Patient with acute on chronic pain.  Multiple procedures have been recommended but patient declines.  He continues with EtOH and smoking and labs continue to be abnormal.  Consideration could be made for lumbar support brace while ambulating but there does not appear to be any significant changes in imaging.  Careful use of opioids for acute pain appropriate.  He needs to follow up with providers as instructed and follow instructions given at visits.    Landen Quiñones MD  [facility]

## 2019-03-05 ENCOUNTER — HOSPITAL ENCOUNTER (EMERGENCY)
Facility: CLINIC | Age: 59
Discharge: HOME | End: 2019-03-05
Attending: EMERGENCY MEDICINE | Admitting: EMERGENCY MEDICINE
Payer: COMMERCIAL

## 2019-03-05 VITALS
WEIGHT: 180 LBS | DIASTOLIC BLOOD PRESSURE: 72 MMHG | BODY MASS INDEX: 29.95 KG/M2 | TEMPERATURE: 98.1 F | SYSTOLIC BLOOD PRESSURE: 157 MMHG | RESPIRATION RATE: 18 BRPM | HEART RATE: 79 BPM | OXYGEN SATURATION: 91 %

## 2019-03-05 DIAGNOSIS — M54.50 ACUTE BILATERAL LOW BACK PAIN WITHOUT SCIATICA: ICD-10-CM

## 2019-03-05 DIAGNOSIS — R11.10 NON-INTRACTABLE VOMITING, PRESENCE OF NAUSEA NOT SPECIFIED, UNSPECIFIED VOMITING TYPE: ICD-10-CM

## 2019-03-05 DIAGNOSIS — R53.1 GENERALIZED WEAKNESS: ICD-10-CM

## 2019-03-05 DIAGNOSIS — G89.29 OTHER CHRONIC PAIN: ICD-10-CM

## 2019-03-05 LAB
ALBUMIN SERPL-MCNC: 3.5 G/DL (ref 3.4–5)
ALP SERPL-CCNC: 251 U/L (ref 40–150)
ALT SERPL W P-5'-P-CCNC: 18 U/L (ref 0–70)
ANION GAP SERPL CALCULATED.3IONS-SCNC: 9 MMOL/L (ref 3–14)
AST SERPL W P-5'-P-CCNC: 38 U/L (ref 0–45)
BASOPHILS # BLD AUTO: 0.1 10E9/L (ref 0–0.2)
BASOPHILS NFR BLD AUTO: 0.7 %
BILIRUB SERPL-MCNC: 2.2 MG/DL (ref 0.2–1.3)
BUN SERPL-MCNC: 27 MG/DL (ref 7–30)
CALCIUM SERPL-MCNC: 9.6 MG/DL (ref 8.5–10.1)
CHLORIDE SERPL-SCNC: 103 MMOL/L (ref 94–109)
CO2 SERPL-SCNC: 26 MMOL/L (ref 20–32)
CREAT SERPL-MCNC: 2.38 MG/DL (ref 0.66–1.25)
DIFFERENTIAL METHOD BLD: ABNORMAL
EOSINOPHIL # BLD AUTO: 0.1 10E9/L (ref 0–0.7)
EOSINOPHIL NFR BLD AUTO: 0.7 %
ERYTHROCYTE [DISTWIDTH] IN BLOOD BY AUTOMATED COUNT: 20.3 % (ref 10–15)
ETHANOL SERPL-MCNC: <0.01 G/DL
GFR SERPL CREATININE-BSD FRML MDRD: 29 ML/MIN/{1.73_M2}
GLUCOSE SERPL-MCNC: 133 MG/DL (ref 70–99)
HCT VFR BLD AUTO: 30.2 % (ref 40–53)
HGB BLD-MCNC: 8.8 G/DL (ref 13.3–17.7)
IMM GRANULOCYTES # BLD: 0.1 10E9/L (ref 0–0.4)
IMM GRANULOCYTES NFR BLD: 0.7 %
INR PPP: 1.44 (ref 0.86–1.14)
LIPASE SERPL-CCNC: 314 U/L (ref 73–393)
LYMPHOCYTES # BLD AUTO: 1.9 10E9/L (ref 0.8–5.3)
LYMPHOCYTES NFR BLD AUTO: 17.8 %
MAGNESIUM SERPL-MCNC: 2 MG/DL (ref 1.6–2.3)
MCH RBC QN AUTO: 26 PG (ref 26.5–33)
MCHC RBC AUTO-ENTMCNC: 29.1 G/DL (ref 31.5–36.5)
MCV RBC AUTO: 89 FL (ref 78–100)
MONOCYTES # BLD AUTO: 1.5 10E9/L (ref 0–1.3)
MONOCYTES NFR BLD AUTO: 13.6 %
NEUTROPHILS # BLD AUTO: 7.1 10E9/L (ref 1.6–8.3)
NEUTROPHILS NFR BLD AUTO: 66.5 %
NRBC # BLD AUTO: 0 10*3/UL
NRBC BLD AUTO-RTO: 0 /100
PLATELET # BLD AUTO: 162 10E9/L (ref 150–450)
POTASSIUM SERPL-SCNC: 3.9 MMOL/L (ref 3.4–5.3)
PROT SERPL-MCNC: 8.3 G/DL (ref 6.8–8.8)
RBC # BLD AUTO: 3.38 10E12/L (ref 4.4–5.9)
SODIUM SERPL-SCNC: 138 MMOL/L (ref 133–144)
WBC # BLD AUTO: 10.8 10E9/L (ref 4–11)

## 2019-03-05 PROCEDURE — 83690 ASSAY OF LIPASE: CPT | Performed by: EMERGENCY MEDICINE

## 2019-03-05 PROCEDURE — 83735 ASSAY OF MAGNESIUM: CPT | Performed by: EMERGENCY MEDICINE

## 2019-03-05 PROCEDURE — 99285 EMERGENCY DEPT VISIT HI MDM: CPT | Mod: 25 | Performed by: EMERGENCY MEDICINE

## 2019-03-05 PROCEDURE — 99285 EMERGENCY DEPT VISIT HI MDM: CPT | Mod: Z6 | Performed by: EMERGENCY MEDICINE

## 2019-03-05 PROCEDURE — 25000128 H RX IP 250 OP 636: Performed by: EMERGENCY MEDICINE

## 2019-03-05 PROCEDURE — 85025 COMPLETE CBC W/AUTO DIFF WBC: CPT | Performed by: EMERGENCY MEDICINE

## 2019-03-05 PROCEDURE — 96374 THER/PROPH/DIAG INJ IV PUSH: CPT | Performed by: EMERGENCY MEDICINE

## 2019-03-05 PROCEDURE — 96375 TX/PRO/DX INJ NEW DRUG ADDON: CPT | Performed by: EMERGENCY MEDICINE

## 2019-03-05 PROCEDURE — 85610 PROTHROMBIN TIME: CPT | Performed by: EMERGENCY MEDICINE

## 2019-03-05 PROCEDURE — 96361 HYDRATE IV INFUSION ADD-ON: CPT | Performed by: EMERGENCY MEDICINE

## 2019-03-05 PROCEDURE — 25800030 ZZH RX IP 258 OP 636: Performed by: EMERGENCY MEDICINE

## 2019-03-05 PROCEDURE — 80320 DRUG SCREEN QUANTALCOHOLS: CPT | Performed by: EMERGENCY MEDICINE

## 2019-03-05 PROCEDURE — 25000132 ZZH RX MED GY IP 250 OP 250 PS 637: Performed by: EMERGENCY MEDICINE

## 2019-03-05 PROCEDURE — 80053 COMPREHEN METABOLIC PANEL: CPT | Performed by: EMERGENCY MEDICINE

## 2019-03-05 RX ORDER — ONDANSETRON 2 MG/ML
4 INJECTION INTRAMUSCULAR; INTRAVENOUS EVERY 30 MIN PRN
Status: DISCONTINUED | OUTPATIENT
Start: 2019-03-05 | End: 2019-03-05 | Stop reason: HOSPADM

## 2019-03-05 RX ORDER — SODIUM CHLORIDE 9 MG/ML
1000 INJECTION, SOLUTION INTRAVENOUS CONTINUOUS
Status: DISCONTINUED | OUTPATIENT
Start: 2019-03-05 | End: 2019-03-05 | Stop reason: HOSPADM

## 2019-03-05 RX ORDER — OXYCODONE AND ACETAMINOPHEN 5; 325 MG/1; MG/1
2 TABLET ORAL ONCE
Status: COMPLETED | OUTPATIENT
Start: 2019-03-05 | End: 2019-03-05

## 2019-03-05 RX ORDER — OXYCODONE AND ACETAMINOPHEN 5; 325 MG/1; MG/1
1 TABLET ORAL EVERY 4 HOURS PRN
Qty: 6 TABLET | Refills: 0 | Status: SHIPPED | OUTPATIENT
Start: 2019-03-05 | End: 2019-03-15

## 2019-03-05 RX ORDER — ONDANSETRON 4 MG/1
4 TABLET, ORALLY DISINTEGRATING ORAL EVERY 8 HOURS PRN
Qty: 10 TABLET | Refills: 0 | Status: SHIPPED | OUTPATIENT
Start: 2019-03-05 | End: 2019-03-15

## 2019-03-05 RX ADMIN — OXYCODONE HYDROCHLORIDE AND ACETAMINOPHEN 2 TABLET: 5; 325 TABLET ORAL at 12:39

## 2019-03-05 RX ADMIN — SODIUM CHLORIDE 1000 ML: 9 INJECTION, SOLUTION INTRAVENOUS at 09:31

## 2019-03-05 RX ADMIN — ONDANSETRON 4 MG: 2 INJECTION INTRAMUSCULAR; INTRAVENOUS at 12:30

## 2019-03-05 RX ADMIN — HYDROMORPHONE HYDROCHLORIDE 1 MG: 1 INJECTION, SOLUTION INTRAMUSCULAR; INTRAVENOUS; SUBCUTANEOUS at 10:52

## 2019-03-05 RX ADMIN — SODIUM CHLORIDE 1000 ML: 9 INJECTION, SOLUTION INTRAVENOUS at 12:31

## 2019-03-05 RX ADMIN — ONDANSETRON 4 MG: 2 INJECTION INTRAMUSCULAR; INTRAVENOUS at 09:30

## 2019-03-05 ASSESSMENT — ENCOUNTER SYMPTOMS
FEVER: 0
SHORTNESS OF BREATH: 0
ABDOMINAL PAIN: 0

## 2019-03-05 NOTE — ED NOTES
Pt up ambulated with wheelchair, has a walker at home. Able to ambulate independently. Pt calling for transportation home.

## 2019-03-05 NOTE — ED AVS SNAPSHOT
[facility] Emergency Department  [address] [address]  Phone:  [phone]  Fax:  [phone]                                    Abhijeet Mccauley   MRN: [id number]    Department:  [facility] Emergency Department   Date of Visit:  3/5/2019           After Visit Summary Signature Page    I have received my discharge instructions, and my questions have been answered. I have discussed any challenges I see with this plan with the nurse or doctor.    ..........................................................................................................................................  Patient/Patient Representative Signature      ..........................................................................................................................................  Patient Representative Print Name and Relationship to Patient    ..................................................               ................................................  Date                                   Time    ..........................................................................................................................................  Reviewed by Signature/Title    ...................................................              ..............................................  Date                                               Time          22EPIC Rev 08/18

## 2019-03-05 NOTE — DISCHARGE INSTRUCTIONS
An appointment has been made with your primary care provider, Dr. Valenzuela on Friday at 10 AM.    Continue home medications.     Use ibuprofen 400-600 mg up to 4 times per day if needed for pain. Stop if it is causing nausea or abdominal pain.   Add Percocet 5-325 (oxycodone-acetaminophen) 1-2 pills up to every 4 hours if needed for pain. Do not use alcohol, operate machinery, drive, or climb on ladders for 8 hours after taking Percocet. Use docusate (100mg) 2 times a day to prevent constipation while on narcotics.    Zofran as needed for nausea/vomiting.

## 2019-03-05 NOTE — ED PROVIDER NOTES
"  History     Chief Complaint   Patient presents with     Nausea, Vomiting, & Diarrhea     last etoh 0100     HPI  Abhijeet Mccauley is a 58 year old male who has past medical history significant for alcohol abuse, COPD, and recent ED visit on February 25, 8 days ago after he was seen after falling off a ladder.  I have reviewed previous medical records, and I am familiar with the patient as I saw him on that previous visit.  He subsequently had follow-up with audiology clinic, and patient had telephone visit requesting pain medications.  However, it has been noted that multiple procedures have been recommended but patient declines, and continues with alcohol and tobacco abuse and needs to follow-up with providers as instructed.    Patient presents because of generalized fatigue, in addition to ongoing pains.  He states he does not have any significant recent changes in health, other than patient has felt generalized illness, with slight amounts of cough, and has been using alcohol in order to drink away his pains, and help control his pain, which are located diffusely throughout his entire body.  Patient states his last drink of alcohol was during the overnight hours.  He began vomiting this morning.  Denies any diarrhea.  No history of DT or alcohol withdrawal seizure.  No new trauma.    Allergies:  Allergies   Allergen Reactions     Blood Transfusion Related (Informational Only)      Patient has a history of a clinically significant antibody against RBC antigens.  A delay in compatible RBCs may occur.     Famotidine Unknown and Other (See Comments)     Severe abdominal cramps     Cyclobenzaprine Hives     Gabapentin Other (See Comments)     Made pt's \"face break out\"     Methocarbamol Other (See Comments)     Made pt's \"face break out\"     Pepcid Cramps     Severe abdominal cramps     Vancomycin Other (See Comments)     hallucinations     Vfend Other (See Comments)     IV - cold sweats, Iron tablet makes him " nauseated and stomach ached     Hydrocodone-Acetaminophen Rash       Problem List:    Patient Active Problem List    Diagnosis Date Noted     COPD exacerbation (H) 01/02/2019     Priority: Medium     Normocytic anemia 05/17/2018     Priority: Medium     Leukocytosis with increased monocytes 05/17/2018     Priority: Medium     Generalized weakness 05/17/2018     Priority: Medium     Tubular adenoma of colon 03/23/2018     Priority: Medium     Collected: 3/18/2018   Received: 3/19/2018   Reported: 3/22/2018 19:19   Ordering Phy(s): SASKIA LEE     For improved result formatting, select 'View Enhanced Report Format' under    Linked Documents section.     SPECIMEN(S):   A: Splenic flexure polyp   B: Rectal polyp     FINAL DIAGNOSIS:   A.  Colon, splenic flexure, mucosal biopsies:   - Tubular adenoma.   - Negative for high-grade dysplasia and malignancy.     B.  Rectum, mucosal biopsy:   - Tubular adenoma.   - Negative for high-grade dysplasia and malignancy.        Peptic ulcer disease 03/16/2018     Priority: Medium     Uncomplicated alcohol dependence (H) 03/16/2018     Priority: Medium     Tobacco dependence syndrome 03/16/2018     Priority: Medium     Mild intermittent asthma without complication 11/13/2017     Priority: Medium     CKD (chronic kidney disease) stage 3, GFR 30-59 ml/min (H) 08/16/2017     Priority: Medium     Rosacea 08/16/2017     Priority: Medium     Sensorineural hearing loss, asymmetrical 03/28/2017     Priority: Medium     Thrombocytopenia (H) 11/11/2014     Priority: Medium     Universal ulcerative (chronic) colitis(556.6) (H) 11/11/2014     Priority: Medium     Alcoholic cirrhosis of liver (H) 11/04/2014     Priority: Medium     Coagulation defect (H) 11/04/2014     Priority: Medium     Acute alcoholic hepatitis 10/22/2014     Priority: Medium     Osteoarthrosis 02/21/2014     Priority: Medium     Essential hypertension 03/28/2011     Priority: Medium     Problem list name updated by  automated process. Provider to review       Acute myeloid leukemia in remission (H) 03/28/2011     Priority: Medium     S/p chemo, did not need bone marrow transplant          Past Medical History:    Past Medical History:   Diagnosis Date     Alcohol dependence (H)      Alcoholic cirrhosis of liver (H)      AML (acute myeloid leukemia) in remission (H)      Chronic obstructive pulmonary disease 5/17/2018     COPD (chronic obstructive pulmonary disease) (H)      History of pulmonary embolus (PE)      Hypertension      PUD (peptic ulcer disease)      Tobacco dependence      Ulcerative colitis (H)        Past Surgical History:    Past Surgical History:   Procedure Laterality Date     AS TOTAL KNEE ARTHROPLASTY Left      BONE MARROW BIOPSY, BONE SPECIMEN, NEEDLE/TROCAR N/A 6/12/2018    Procedure: BIOPSY BONE MARROW;  Bone Marrow Biopsy;  Surgeon: Demar Sapp MD;  Location: [facility]     ESOPHAGOSCOPY, GASTROSCOPY, DUODENOSCOPY (EGD), COMBINED N/A 2/8/2018    Procedure: COMBINED ESOPHAGOSCOPY, GASTROSCOPY, DUODENOSCOPY (EGD), BIOPSY SINGLE OR MULTIPLE;  gastroscopy with biopsies;  Surgeon: Raz Cobb MD;  Location: [facility]     ESOPHAGOSCOPY, GASTROSCOPY, DUODENOSCOPY (EGD), COMBINED N/A 3/18/2018    Procedure: COMBINED ESOPHAGOSCOPY, GASTROSCOPY, DUODENOSCOPY (EGD);;  Surgeon: Raz Cobb MD;  Location: [facility]     LACERATION REPAIR Right     Right leg       Family History:    Family History   Problem Relation Age of Onset     Hypertension Mother      Coronary Artery Disease Father         MI       Social History:  Marital Status:  Single [1]  Social History     Tobacco Use     Smoking status: Current Every Day Smoker     Packs/day: 0.50     Years: 30.00     Pack years: 15.00     Types: Cigarettes     Smokeless tobacco: Never Used     Tobacco comment: I strongly emphasized the importance of quitting smoking. 4-5 daily   Substance Use Topics     Alcohol use: Yes     Comment: Down to 3 beers per day.   Sometimes a cocktail also.     Drug use: Yes     Types: Marijuana        Medications:      acetaminophen (TYLENOL) 500 MG tablet   baclofen (LIORESAL) 10 MG tablet   furosemide (LASIX) 20 MG tablet   levETIRAcetam (KEPPRA) 250 MG tablet   loratadine (CLARITIN) 10 MG tablet   mometasone-formoterol (DULERA) 200-5 MCG/ACT oral inhaler   ondansetron (ZOFRAN ODT) 4 MG ODT tab   oxyCODONE-acetaminophen (PERCOCET) 5-325 MG tablet   pantoprazole (PROTONIX) 40 MG EC tablet   potassium chloride SA (K-DUR/KLOR-CON M) 10 MEQ CR tablet   propranolol (INDERAL) 20 MG tablet   traZODone (DESYREL) 50 MG tablet   desonide (DESOWEN) 0.05 % external lotion   order for DME   order for DME   SODIUM BICARBONATE PO   tiotropium (SPIRIVA) 18 MCG capsule   VENTOLIN  (90 Base) MCG/ACT inhaler         Review of Systems   Constitutional: Negative for fever.   Respiratory: Negative for shortness of breath.    Cardiovascular: Negative for chest pain.   Gastrointestinal: Negative for abdominal pain.   All other systems reviewed and are negative.      Physical Exam   BP: 150/78  Pulse: 77  Temp: 98.1  F (36.7  C)  Resp: 18  Weight: 81.6 kg (180 lb)  SpO2: 99 %      Physical Exam  /72   Pulse 79   Temp 98.1  F (36.7  C) (Oral)   Resp 18   Wt 81.6 kg (180 lb)   SpO2 91%   BMI 29.95 kg/m    General: alert, interactive, in no apparent distress.  Able to scoot around in bed without any difficulty, and on his own.  Head: atraumatic  Nose: no rhinorrhea or epistaxis  Ears: no external auditory canal discharge or bleeding.    Eyes: Sclera nonicteric. Conjunctiva noninjected.    Mouth: no tonsillar erythema, edema, or exudate  Neck: supple, no palp LAD  Lungs: CTAB  Back: No notable trauma, however does have somewhat exaggerated response to  minimal palpation of low back  CV: RRR, S1/S2; peripheral pulses palpable and symmetric  Abdomen: soft, nt, nd, no guarding or rebound. Positive bowel sounds  Extremities: no cyanosis or edema  Skin:  no rash or diaphoresis  Neuro: CN II-XII grossly intact, strength 5/5 in UE and LEs bilaterally, sensation intact to light touch in UE and LEs bilaterally;       ED Course        Procedures               Critical Care time:  none               Results for orders placed or performed during the hospital encounter of 03/05/19 (from the past 24 hour(s))   CBC with platelets differential   Result Value Ref Range    WBC 10.8 4.0 - 11.0 10e9/L    RBC Count 3.38 (L) 4.4 - 5.9 10e12/L    Hemoglobin 8.8 (L) 13.3 - 17.7 g/dL    Hematocrit 30.2 (L) 40.0 - 53.0 %    MCV 89 78 - 100 fl    MCH 26.0 (L) 26.5 - 33.0 pg    MCHC 29.1 (L) 31.5 - 36.5 g/dL    RDW 20.3 (H) 10.0 - 15.0 %    Platelet Count 162 150 - 450 10e9/L    Diff Method Automated Method     % Neutrophils 66.5 %    % Lymphocytes 17.8 %    % Monocytes 13.6 %    % Eosinophils 0.7 %    % Basophils 0.7 %    % Immature Granulocytes 0.7 %    Nucleated RBCs 0 0 /100    Absolute Neutrophil 7.1 1.6 - 8.3 10e9/L    Absolute Lymphocytes 1.9 0.8 - 5.3 10e9/L    Absolute Monocytes 1.5 (H) 0.0 - 1.3 10e9/L    Absolute Eosinophils 0.1 0.0 - 0.7 10e9/L    Absolute Basophils 0.1 0.0 - 0.2 10e9/L    Abs Immature Granulocytes 0.1 0 - 0.4 10e9/L    Absolute Nucleated RBC 0.0    Comprehensive metabolic panel   Result Value Ref Range    Sodium 138 133 - 144 mmol/L    Potassium 3.9 3.4 - 5.3 mmol/L    Chloride 103 94 - 109 mmol/L    Carbon Dioxide 26 20 - 32 mmol/L    Anion Gap 9 3 - 14 mmol/L    Glucose 133 (H) 70 - 99 mg/dL    Urea Nitrogen 27 7 - 30 mg/dL    Creatinine 2.38 (H) 0.66 - 1.25 mg/dL    GFR Estimate 29 (L) >60 mL/min/[1.73_m2]    GFR Estimate If Black 33 (L) >60 mL/min/[1.73_m2]    Calcium 9.6 8.5 - 10.1 mg/dL    Bilirubin Total 2.2 (H) 0.2 - 1.3 mg/dL    Albumin 3.5 3.4 - 5.0 g/dL    Protein Total 8.3 6.8 - 8.8 g/dL    Alkaline Phosphatase 251 (H) 40 - 150 U/L    ALT 18 0 - 70 U/L    AST 38 0 - 45 U/L   Lipase   Result Value Ref Range    Lipase 314 73 - 393 U/L   INR   Result  Value Ref Range    INR 1.44 (H) 0.86 - 1.14   Magnesium   Result Value Ref Range    Magnesium 2.0 1.6 - 2.3 mg/dL   Alcohol ethyl   Result Value Ref Range    Ethanol g/dL <0.01 <0.01 g/dL       Medications   0.9% sodium chloride BOLUS (0 mLs Intravenous Stopped 3/5/19 1230)   HYDROmorphone (DILAUDID) injection 1 mg (1 mg Intravenous Given 3/5/19 1052)   oxyCODONE-acetaminophen (PERCOCET) 5-325 MG per tablet 2 tablet (2 tablets Oral Given 3/5/19 1239)       Assessments & Plan (with Medical Decision Making)  58 year old male, with past medical history as reviewed above, with recent ED visit 8 days ago after patient fell from a ladder.  I am familiar with patient from that ED visit as I saw him during that visit.  Patient had been given small amount of Percocet for home, and patient presents with ongoing severe pains, stating he is unable to manage at home.  He reports he has been drinking alcohol, and patient does have history of alcohol abuse, and it is notable in his chart that he has had noncompliance with recommendations from providers, and has not been following up with providers as has been recommended.  Based on patient's complaints of nausea, and vomiting, IV was established, labs drawn, IV fluids, and Zofran given.  Hemoglobin is stable at 8.8, white blood cell count normal, and CMP is unremarkable.  Patient does have chronic kidney disease, and creatinine is approximately at baseline at 2.38.  Lipase normal.  INR is elevated, however likely secondary to his alcohol abuse, with liver disease.    Patient was frequently complaining of severe pains.  He was given 1 mg IV Dilaudid.  Also given 2 tablets Percocet.  Patient does not have any external signs of trauma, and he has somewhat exaggerated response to even minimal palpation of the low back.  He is scooting around in bed without any difficulty.    At this point, do not feel that patient requires additional hospitalization.  No focal findings on laboratory  workup.  No new trauma, or bony pains on physical exam, and therefore do not feel patient requires hospitalization.  I have arranged for outpatient follow-up with his primary care provider, with appointment which has been made for the patient at 10 AM on Friday.  He was given 6 tablets Percocet for home, in addition to 10 tablets Zofran.  Encouraged to maintain and follow-up with his appointment as scheduled.  Physical therapy referral will also be placed.     I have reviewed the nursing notes.    I have reviewed the findings, diagnosis, plan and need for follow up with the patient.          Medication List      Started    ondansetron 4 MG ODT tab  Commonly known as:  ZOFRAN ODT  4 mg, Oral, EVERY 8 HOURS PRN     * oxyCODONE-acetaminophen 5-325 MG tablet  Commonly known as:  PERCOCET  1 tablet, Oral, EVERY 4 HOURS PRN         * This list has 1 medication(s) that are the same as other medications prescribed for you. Read the directions carefully, and ask your doctor or other care provider to review them with you.            ASK your doctor about these medications    * oxyCODONE-acetaminophen 5-325 MG tablet  Commonly known as:  PERCOCET  1-2 tablets, Oral, EVERY 4 HOURS PRN  Ask about: Should I take this medication?         * This list has 1 medication(s) that are the same as other medications prescribed for you. Read the directions carefully, and ask your doctor or other care provider to review them with you.                Final diagnoses:   Non-intractable vomiting, presence of nausea not specified, unspecified vomiting type   Other chronic pain   Acute bilateral low back pain without sciatica       3/5/2019   [facility] EMERGENCY DEPARTMENT     Parrish Billy MD  03/05/19 0815

## 2019-03-09 ENCOUNTER — HOSPITAL ENCOUNTER (EMERGENCY)
Facility: CLINIC | Age: 59
Discharge: HOME | End: 2019-03-09
Attending: FAMILY MEDICINE | Admitting: FAMILY MEDICINE
Payer: COMMERCIAL

## 2019-03-09 ENCOUNTER — APPOINTMENT (OUTPATIENT)
Dept: MRI IMAGING | Facility: CLINIC | Age: 59
End: 2019-03-09
Attending: FAMILY MEDICINE
Payer: COMMERCIAL

## 2019-03-09 VITALS
SYSTOLIC BLOOD PRESSURE: 120 MMHG | TEMPERATURE: 98.6 F | HEART RATE: 70 BPM | DIASTOLIC BLOOD PRESSURE: 71 MMHG | WEIGHT: 180 LBS | HEIGHT: 66 IN | OXYGEN SATURATION: 93 % | BODY MASS INDEX: 28.93 KG/M2

## 2019-03-09 DIAGNOSIS — N39.0 UTI (URINARY TRACT INFECTION), BACTERIAL: ICD-10-CM

## 2019-03-09 DIAGNOSIS — G89.29 CHRONIC BACK PAIN, UNSPECIFIED BACK LOCATION, UNSPECIFIED BACK PAIN LATERALITY: ICD-10-CM

## 2019-03-09 DIAGNOSIS — M54.9 CHRONIC BACK PAIN, UNSPECIFIED BACK LOCATION, UNSPECIFIED BACK PAIN LATERALITY: ICD-10-CM

## 2019-03-09 DIAGNOSIS — M62.81 GENERALIZED MUSCLE WEAKNESS: ICD-10-CM

## 2019-03-09 DIAGNOSIS — A49.9 UTI (URINARY TRACT INFECTION), BACTERIAL: ICD-10-CM

## 2019-03-09 DIAGNOSIS — R20.2 PARESTHESIAS: ICD-10-CM

## 2019-03-09 DIAGNOSIS — M62.838 MUSCLE SPASM: ICD-10-CM

## 2019-03-09 LAB
ALBUMIN SERPL-MCNC: 3.4 G/DL (ref 3.4–5)
ALBUMIN UR-MCNC: 30 MG/DL
ALP SERPL-CCNC: 224 U/L (ref 40–150)
ALT SERPL W P-5'-P-CCNC: 17 U/L (ref 0–70)
ANION GAP SERPL CALCULATED.3IONS-SCNC: 10 MMOL/L (ref 3–14)
APPEARANCE UR: ABNORMAL
APTT PPP: 36 SEC (ref 22–37)
AST SERPL W P-5'-P-CCNC: 32 U/L (ref 0–45)
BACTERIA #/AREA URNS HPF: ABNORMAL /HPF
BASOPHILS # BLD AUTO: 0.1 10E9/L (ref 0–0.2)
BASOPHILS NFR BLD AUTO: 0.7 %
BILIRUB SERPL-MCNC: 1.9 MG/DL (ref 0.2–1.3)
BILIRUB UR QL STRIP: NEGATIVE
BUN SERPL-MCNC: 29 MG/DL (ref 7–30)
CALCIUM SERPL-MCNC: 9.2 MG/DL (ref 8.5–10.1)
CHLORIDE SERPL-SCNC: 99 MMOL/L (ref 94–109)
CO2 SERPL-SCNC: 25 MMOL/L (ref 20–32)
COLOR UR AUTO: ABNORMAL
CREAT SERPL-MCNC: 2.34 MG/DL (ref 0.66–1.25)
CRP SERPL-MCNC: 24.4 MG/L (ref 0–8)
DIFFERENTIAL METHOD BLD: ABNORMAL
EOSINOPHIL # BLD AUTO: 0.3 10E9/L (ref 0–0.7)
EOSINOPHIL NFR BLD AUTO: 2.7 %
ERYTHROCYTE [DISTWIDTH] IN BLOOD BY AUTOMATED COUNT: 20.9 % (ref 10–15)
ETHANOL SERPL-MCNC: <0.01 G/DL
GFR SERPL CREATININE-BSD FRML MDRD: 29 ML/MIN/{1.73_M2}
GLUCOSE SERPL-MCNC: 83 MG/DL (ref 70–99)
GLUCOSE UR STRIP-MCNC: NEGATIVE MG/DL
HCT VFR BLD AUTO: 29.1 % (ref 40–53)
HGB BLD-MCNC: 8.5 G/DL (ref 13.3–17.7)
HGB UR QL STRIP: ABNORMAL
IMM GRANULOCYTES # BLD: 0 10E9/L (ref 0–0.4)
IMM GRANULOCYTES NFR BLD: 0.4 %
INR PPP: 1.47 (ref 0.86–1.14)
KETONES UR STRIP-MCNC: NEGATIVE MG/DL
LACTATE BLD-SCNC: 1.4 MMOL/L (ref 0.7–2)
LEUKOCYTE ESTERASE UR QL STRIP: ABNORMAL
LYMPHOCYTES # BLD AUTO: 1.7 10E9/L (ref 0.8–5.3)
LYMPHOCYTES NFR BLD AUTO: 15.6 %
MCH RBC QN AUTO: 26.6 PG (ref 26.5–33)
MCHC RBC AUTO-ENTMCNC: 29.2 G/DL (ref 31.5–36.5)
MCV RBC AUTO: 91 FL (ref 78–100)
MONOCYTES # BLD AUTO: 1.6 10E9/L (ref 0–1.3)
MONOCYTES NFR BLD AUTO: 14.7 %
NEUTROPHILS # BLD AUTO: 7.2 10E9/L (ref 1.6–8.3)
NEUTROPHILS NFR BLD AUTO: 65.9 %
NITRATE UR QL: POSITIVE
NRBC # BLD AUTO: 0 10*3/UL
NRBC BLD AUTO-RTO: 0 /100
PH UR STRIP: 5 PH (ref 5–7)
PLATELET # BLD AUTO: 167 10E9/L (ref 150–450)
POTASSIUM SERPL-SCNC: 3.3 MMOL/L (ref 3.4–5.3)
PROT SERPL-MCNC: 7.4 G/DL (ref 6.8–8.8)
RBC # BLD AUTO: 3.19 10E12/L (ref 4.4–5.9)
RBC #/AREA URNS AUTO: 136 /HPF (ref 0–2)
SODIUM SERPL-SCNC: 134 MMOL/L (ref 133–144)
SOURCE: ABNORMAL
SP GR UR STRIP: 1.01 (ref 1–1.03)
UROBILINOGEN UR STRIP-MCNC: 4 MG/DL (ref 0–2)
WBC # BLD AUTO: 10.9 10E9/L (ref 4–11)
WBC #/AREA URNS AUTO: 1886 /HPF (ref 0–5)
WBC CLUMPS #/AREA URNS HPF: PRESENT /HPF
YEAST #/AREA URNS HPF: ABNORMAL /HPF

## 2019-03-09 PROCEDURE — 87086 URINE CULTURE/COLONY COUNT: CPT | Performed by: FAMILY MEDICINE

## 2019-03-09 PROCEDURE — 81001 URINALYSIS AUTO W/SCOPE: CPT | Performed by: FAMILY MEDICINE

## 2019-03-09 PROCEDURE — 83605 ASSAY OF LACTIC ACID: CPT | Performed by: FAMILY MEDICINE

## 2019-03-09 PROCEDURE — 85610 PROTHROMBIN TIME: CPT | Performed by: FAMILY MEDICINE

## 2019-03-09 PROCEDURE — 72141 MRI NECK SPINE W/O DYE: CPT

## 2019-03-09 PROCEDURE — 99285 EMERGENCY DEPT VISIT HI MDM: CPT | Mod: 25

## 2019-03-09 PROCEDURE — 96361 HYDRATE IV INFUSION ADD-ON: CPT

## 2019-03-09 PROCEDURE — 86140 C-REACTIVE PROTEIN: CPT | Performed by: FAMILY MEDICINE

## 2019-03-09 PROCEDURE — 96374 THER/PROPH/DIAG INJ IV PUSH: CPT

## 2019-03-09 PROCEDURE — 25800030 ZZH RX IP 258 OP 636: Performed by: FAMILY MEDICINE

## 2019-03-09 PROCEDURE — 85025 COMPLETE CBC W/AUTO DIFF WBC: CPT | Performed by: FAMILY MEDICINE

## 2019-03-09 PROCEDURE — 72148 MRI LUMBAR SPINE W/O DYE: CPT

## 2019-03-09 PROCEDURE — 80320 DRUG SCREEN QUANTALCOHOLS: CPT | Performed by: FAMILY MEDICINE

## 2019-03-09 PROCEDURE — 25000128 H RX IP 250 OP 636: Performed by: FAMILY MEDICINE

## 2019-03-09 PROCEDURE — 99285 EMERGENCY DEPT VISIT HI MDM: CPT | Mod: Z6 | Performed by: FAMILY MEDICINE

## 2019-03-09 PROCEDURE — 80053 COMPREHEN METABOLIC PANEL: CPT | Performed by: FAMILY MEDICINE

## 2019-03-09 PROCEDURE — 85730 THROMBOPLASTIN TIME PARTIAL: CPT | Performed by: FAMILY MEDICINE

## 2019-03-09 RX ORDER — HYDROMORPHONE HYDROCHLORIDE 1 MG/ML
0.5 INJECTION, SOLUTION INTRAMUSCULAR; INTRAVENOUS; SUBCUTANEOUS ONCE
Status: COMPLETED | OUTPATIENT
Start: 2019-03-09 | End: 2019-03-09

## 2019-03-09 RX ORDER — SODIUM CHLORIDE, SODIUM LACTATE, POTASSIUM CHLORIDE, CALCIUM CHLORIDE 600; 310; 30; 20 MG/100ML; MG/100ML; MG/100ML; MG/100ML
1000 INJECTION, SOLUTION INTRAVENOUS CONTINUOUS
Status: DISCONTINUED | OUTPATIENT
Start: 2019-03-09 | End: 2019-03-09 | Stop reason: HOSPADM

## 2019-03-09 RX ORDER — CIPROFLOXACIN 500 MG/1
500 TABLET, FILM COATED ORAL 2 TIMES DAILY
Qty: 20 TABLET | Refills: 0 | Status: SHIPPED | OUTPATIENT
Start: 2019-03-09 | End: 2019-03-15

## 2019-03-09 RX ADMIN — Medication 0.5 MG: at 10:14

## 2019-03-09 RX ADMIN — SODIUM CHLORIDE, POTASSIUM CHLORIDE, SODIUM LACTATE AND CALCIUM CHLORIDE 1000 ML: 600; 310; 30; 20 INJECTION, SOLUTION INTRAVENOUS at 10:04

## 2019-03-09 ASSESSMENT — MIFFLIN-ST. JEOR: SCORE: 1569.22

## 2019-03-09 NOTE — DISCHARGE INSTRUCTIONS
Push fluids, rest.  Pain medications as you have been previously directed.  Follow-up as outpatient, specifically with respect to paresthesias and weakness with neurology.  Contact information given.  Antibiotic as directed for the UTI in the form of Cipro.  Follow-up in clinic with your primary care provider in 10-14 days.  Return to the emergency department for acute concerns.

## 2019-03-09 NOTE — ED AVS SNAPSHOT
[facility] Emergency Department  [address] [address]  Phone:  [phone]  Fax:  [phone]                                    Abhijeet Mccauley   MRN: [id number]    Department:  [facility] Emergency Department   Date of Visit:  3/9/2019           After Visit Summary Signature Page    I have received my discharge instructions, and my questions have been answered. I have discussed any challenges I see with this plan with the nurse or doctor.    ..........................................................................................................................................  Patient/Patient Representative Signature      ..........................................................................................................................................  Patient Representative Print Name and Relationship to Patient    ..................................................               ................................................  Date                                   Time    ..........................................................................................................................................  Reviewed by Signature/Title    ...................................................              ..............................................  Date                                               Time          22EPIC Rev 08/18

## 2019-03-09 NOTE — ED PROVIDER NOTES
"  History     Chief Complaint   Patient presents with     Generalized Weakness     HPI  Abhijeet Mccauley is a 58 year old male, past medical history is significant for COPD, anemia, generalized weakness, peptic ulcer disease, alcohol dependence, tobacco dependence, stage III kidney disease, alcoholic cirrhosis, coagulation defect, alcoholic hepatitis, osteoarthritis, hypertension, AML in remission, sent to the emergency department via EMS after a fall off a ladder at his home 3 weeks prior to presentation.  The patient states that he has been seen in the emergency department twice for this already, but that they have \"done nothing for me\", and states that he is refusing to leave and wants to be admitted.  The patient states that he fell approximately 2/23/19 and was evaluated in the emergency department 2/25/19 when they did nothing.  Upon review of the patient's note from that visit he had extensive lab diagnostics which I reviewed as well as imaging of his head neck thoracic lumbar spine and CT of the chest as well.  I reviewed results of all of these imaging studies in the room with the patient.  He was subsequently re-seen on 3/5/19, note was made at that time that the patient has had follow-up with audiology clinic as well as a telephone visit requesting pain medication, multiple procedures had been recommended but the patient declines any continues with alcohol and tobacco abuse needs to follow-up with providers as instructed.  He states he had an appointment yesterday with Dr. Valle his primary care physician but was unable to make it because he did not have a ride and he cannot walk because he feels so weak.  He identifies ongoing concerns of pain which is above baseline with respect to his neck and low back area.  He denied bowel or bladder symptoms such as loss of control or inability to void or defecate.  It was difficult to get the patient to identify precisely why he came in today, continues to drink " "and smoke, he states that he has had tingling and numbness of his hands and feet for a long time but will give me a definite.,  He has been seen by pain clinic for this, this is been worse in the past month and certainly since the fall described on 2/23/19.      Allergies:  Allergies   Allergen Reactions     Blood Transfusion Related (Informational Only)      Patient has a history of a clinically significant antibody against RBC antigens.  A delay in compatible RBCs may occur.     Famotidine Unknown and Other (See Comments)     Severe abdominal cramps     Cyclobenzaprine Hives     Gabapentin Other (See Comments)     Made pt's \"face break out\"     Methocarbamol Other (See Comments)     Made pt's \"face break out\"     Pepcid Cramps     Severe abdominal cramps     Vancomycin Other (See Comments)     hallucinations     Vfend Other (See Comments)     IV - cold sweats, Iron tablet makes him nauseated and stomach ached     Hydrocodone-Acetaminophen Rash       Problem List:    Patient Active Problem List    Diagnosis Date Noted     COPD exacerbation (H) 01/02/2019     Priority: Medium     Normocytic anemia 05/17/2018     Priority: Medium     Leukocytosis with increased monocytes 05/17/2018     Priority: Medium     Generalized weakness 05/17/2018     Priority: Medium     Tubular adenoma of colon 03/23/2018     Priority: Medium     Collected: 3/18/2018   Received: 3/19/2018   Reported: 3/22/2018 19:19   Ordering Phy(s): SASKIA LEE     For improved result formatting, select 'View Enhanced Report Format' under    Linked Documents section.     SPECIMEN(S):   A: Splenic flexure polyp   B: Rectal polyp     FINAL DIAGNOSIS:   A.  Colon, splenic flexure, mucosal biopsies:   - Tubular adenoma.   - Negative for high-grade dysplasia and malignancy.     B.  Rectum, mucosal biopsy:   - Tubular adenoma.   - Negative for high-grade dysplasia and malignancy.        Peptic ulcer disease 03/16/2018     Priority: Medium     Uncomplicated " alcohol dependence (H) 03/16/2018     Priority: Medium     Tobacco dependence syndrome 03/16/2018     Priority: Medium     Mild intermittent asthma without complication 11/13/2017     Priority: Medium     CKD (chronic kidney disease) stage 3, GFR 30-59 ml/min (H) 08/16/2017     Priority: Medium     Rosacea 08/16/2017     Priority: Medium     Sensorineural hearing loss, asymmetrical 03/28/2017     Priority: Medium     Thrombocytopenia (H) 11/11/2014     Priority: Medium     Universal ulcerative (chronic) colitis(556.6) (H) 11/11/2014     Priority: Medium     Alcoholic cirrhosis of liver (H) 11/04/2014     Priority: Medium     Coagulation defect (H) 11/04/2014     Priority: Medium     Acute alcoholic hepatitis 10/22/2014     Priority: Medium     Osteoarthrosis 02/21/2014     Priority: Medium     Essential hypertension 03/28/2011     Priority: Medium     Problem list name updated by automated process. Provider to review       Acute myeloid leukemia in remission (H) 03/28/2011     Priority: Medium     S/p chemo, did not need bone marrow transplant          Past Medical History:    Past Medical History:   Diagnosis Date     Alcohol dependence (H)      Alcoholic cirrhosis of liver (H)      AML (acute myeloid leukemia) in remission (H)      Chronic obstructive pulmonary disease 5/17/2018     COPD (chronic obstructive pulmonary disease) (H)      History of pulmonary embolus (PE)      Hypertension      PUD (peptic ulcer disease)      Tobacco dependence      Ulcerative colitis (H)        Past Surgical History:    Past Surgical History:   Procedure Laterality Date     AS TOTAL KNEE ARTHROPLASTY Left      BONE MARROW BIOPSY, BONE SPECIMEN, NEEDLE/TROCAR N/A 6/12/2018    Procedure: BIOPSY BONE MARROW;  Bone Marrow Biopsy;  Surgeon: Demar Sapp MD;  Location: [facility]     ESOPHAGOSCOPY, GASTROSCOPY, DUODENOSCOPY (EGD), COMBINED N/A 2/8/2018    Procedure: COMBINED ESOPHAGOSCOPY, GASTROSCOPY, DUODENOSCOPY (EGD),  "BIOPSY SINGLE OR MULTIPLE;  gastroscopy with biopsies;  Surgeon: Raz Cobb MD;  Location: [facility]     ESOPHAGOSCOPY, GASTROSCOPY, DUODENOSCOPY (EGD), COMBINED N/A 3/18/2018    Procedure: COMBINED ESOPHAGOSCOPY, GASTROSCOPY, DUODENOSCOPY (EGD);;  Surgeon: Raz Cobb MD;  Location: [facility]     LACERATION REPAIR Right     Right leg       Family History:    Family History   Problem Relation Age of Onset     Hypertension Mother      Coronary Artery Disease Father         MI       Social History:  Marital Status:  Single [1]  Social History     Tobacco Use     Smoking status: Current Every Day Smoker     Packs/day: 0.50     Years: 30.00     Pack years: 15.00     Types: Cigarettes     Smokeless tobacco: Never Used     Tobacco comment: I strongly emphasized the importance of quitting smoking. 4-5 daily   Substance Use Topics     Alcohol use: Yes     Comment: Down to 3 beers per day.  Sometimes a cocktail also.     Drug use: Yes     Types: Marijuana        Medications:      ciprofloxacin (CIPRO) 500 MG tablet   acetaminophen (TYLENOL) 500 MG tablet   baclofen (LIORESAL) 10 MG tablet   desonide (DESOWEN) 0.05 % external lotion   furosemide (LASIX) 20 MG tablet   levETIRAcetam (KEPPRA) 250 MG tablet   loratadine (CLARITIN) 10 MG tablet   mometasone-formoterol (DULERA) 200-5 MCG/ACT oral inhaler   order for DME   order for DME   oxyCODONE-acetaminophen (PERCOCET) 5-325 MG tablet   pantoprazole (PROTONIX) 40 MG EC tablet   potassium chloride SA (K-DUR/KLOR-CON M) 10 MEQ CR tablet   propranolol (INDERAL) 20 MG tablet   SODIUM BICARBONATE PO   tiotropium (SPIRIVA) 18 MCG capsule   traZODone (DESYREL) 50 MG tablet   VENTOLIN  (90 Base) MCG/ACT inhaler         Review of Systems   All other systems reviewed and are negative.      Physical Exam   BP: 155/74  Pulse: 66  Temp: 98.6  F (37  C)  Height: 167.6 cm (5' 6\")  Weight: 81.6 kg (180 lb)  SpO2: 97 %      Physical Exam   Constitutional: He is oriented to person, place, " and time. He appears well-developed and well-nourished.   Alert, no acute distress, irritable, belligerent, confrontational, poor historian  Does not appear ill or toxic.  No acute distress   HENT:   Head: Normocephalic and atraumatic.   Right Ear: External ear normal.   Left Ear: External ear normal.   Nose: Nose normal.   Mouth/Throat: Oropharynx is clear and moist.   Eyes: Conjunctivae and EOM are normal. Pupils are equal, round, and reactive to light.   Neck: Normal range of motion. Neck supple.   Cardiovascular: Normal rate, regular rhythm, normal heart sounds and intact distal pulses.   Pulmonary/Chest: Effort normal and breath sounds normal.   Abdominal: Soft. Bowel sounds are normal.   Musculoskeletal: Normal range of motion.   Neurological: He is alert and oriented to person, place, and time.   Skin: Skin is warm. Capillary refill takes less than 2 seconds.   Psychiatric: He has a normal mood and affect. His behavior is normal.   Nursing note and vitals reviewed.      ED Course        Procedures             Labs Ordered and Resulted from Time of ED Arrival Up to the Time of Departure from the ED   CBC WITH PLATELETS DIFFERENTIAL - Abnormal; Notable for the following components:       Result Value    RBC Count 3.19 (*)     Hemoglobin 8.5 (*)     Hematocrit 29.1 (*)     MCHC 29.2 (*)     RDW 20.9 (*)     Absolute Monocytes 1.6 (*)     All other components within normal limits   CRP INFLAMMATION - Abnormal; Notable for the following components:    CRP Inflammation 24.4 (*)     All other components within normal limits   INR - Abnormal; Notable for the following components:    INR 1.47 (*)     All other components within normal limits   COMPREHENSIVE METABOLIC PANEL - Abnormal; Notable for the following components:    Potassium 3.3 (*)     Creatinine 2.34 (*)     GFR Estimate 29 (*)     GFR Estimate If Black 34 (*)     Bilirubin Total 1.9 (*)     Alkaline Phosphatase 224 (*)     All other components within  normal limits   ROUTINE UA WITH MICROSCOPIC REFLEX TO CULTURE - Abnormal; Notable for the following components:    Blood Urine Large (*)     Protein Albumin Urine 30 (*)     Urobilinogen mg/dL 4.0 (*)     Nitrite Urine Positive (*)     Leukocyte Esterase Urine Large (*)     WBC Urine 1,886 (*)     RBC Urine 136 (*)     WBC Clumps Present (*)     Bacteria Urine Few (*)     Yeast Urine Few (*)     All other components within normal limits   PARTIAL THROMBOPLASTIN TIME   LACTIC ACID WHOLE BLOOD   ALCOHOL ETHYL     Results for orders placed or performed during the hospital encounter of 03/09/19   MR Cervical Spine w/o Contrast    Narrative    MR CERVICAL SPINE WITHOUT CONTRAST March 9, 2019 11:11 AM    HISTORY: Progressive neck and upper extremity paresthesias. History of  leukemia nine years ago.     TECHNIQUE: Sagittal T1, T2, STIR and gradient echo images and axial  gradient echo and T2-weighted images. Exam quality is significantly  compromised by patient motion artifact on multiple sequences.    COMPARISON: 8/7/2018.    FINDINGS: The cervical and upper thoracic spinal cord as well as the  cervical medullary junction all appear intrinsically normal. No  significant alignment abnormalities. Benign peridiscal degenerative  signal change at multiple levels. No acute bony abnormality.    C2-C3: Normal disc space. Posterior facet degenerative changes on the  left. No significant central or foraminal stenosis. No change.    C3-C4: Chronic advanced degenerative disc space narrowing with disc  bulging and endplate spurring partially effacing the anterior  subarachnoid space without significant overall central stenosis. At  least mild bilateral foraminal stenosis is probably unchanged but  difficult to assess due to motion artifact. No significant posterior  facet degenerative changes. No change.    C4-C5: Chronic advanced degenerative disc space narrowing with disc  bulging and endplate spurring mildly indenting the  anterior thecal sac  with mild overall central stenosis and no deformity of the cord.  Foramina are difficult evaluate due to motion artifacts but there is  probably mild bilateral foraminal stenosis. Minimal, if any posterior  facet degenerative changes. No change.    C5-C6: Chronic advanced degenerative disc space narrowing with disc  bulging and endplate spurring and no acute disc protrusion. Mild  central stenosis. Foramina are difficult to evaluate because of motion  artifacts. Probable moderate bilateral foraminal stenosis without  change. No significant posterior facet degenerative changes. No  change.    C6-C7: Moderate degenerative disc space narrowing with minimal disc  bulging and no disc protrusion or central stenosis. Foramina are  difficult evaluate because of motion artifacts. Probable mild  posterior facet degenerative changes. No change.    C7-T1: Normal disc space. Moderate posterior facet degenerative change  on right. No central or foraminal stenosis. No change.    T1-T2: Normal.    Paraspinal soft tissues appear normal to the extent visualized.      Impression    IMPRESSION: Multilevel advanced degenerative disc disease from the C3  through the C7 level with mild central stenosis at some levels as  described above and without significant change since the prior exam.  Multilevel foraminal stenosis is probably unchanged since the prior  exam but difficult to evaluate on the current study due to motion  artifacts.    AKIN DORANTES MD   Lumbar spine MRI w/o contrast    Narrative    MR LUMBAR SPINE WITHOUT CONTRAST March 9, 2019 11:39 AM    HISTORY: Low back pain, progressive paresthesias lower extremity and  weakness.    TECHNIQUE: Sagittal T1 and T2 and STIR, axial proton density and T2  images.    COMPARISON: CT lumbar spine 2/25/2019.    FINDINGS: Five functional lumbar vertebral segments were documented on  the previous CT exam. The caudal thecal sac contents appear  intrinsically normal.  Subtle degenerative retrolisthesis T12 on L1 and  L1 on L2 and degenerative anterolisthesis of L4 on L5, all without  change. Alignment in the sagittal plane is otherwise normal. Chronic  mild wedge compression deformity of L1 without change. Underlying  endplate irregularity at the thoracolumbar junction may indicate old  Scheuermann's disease. Schmorl's node involving the inferior endplate  of T11. No acute bony abnormality.    T11-T12: No disc bulge/protrusion or central or foraminal stenosis.    T12-L1: Posterior disc space narrowing, mild disc bulging and no disc  protrusion or significant central or foraminal stenosis. Posterior  facets are unremarkable.    L1-L2: Mild disc space narrowing posteriorly with minimal disc bulging  and no disc protrusion or significant central or foraminal stenosis.  Posterior facets are normal. No change.    L2-L3: Signal loss, posterior disc bulging and no acute disc  protrusion. Mild central stenosis related to multiple factors. Minimal  bilateral foraminal narrowing. Posterior facets are unremarkable. No  change.    L3-L4: Signal loss without disc space narrowing. Minimal disc bulging  and no disc protrusion or central stenosis. Very mild bilateral  foraminal narrowing and no significant posterior facet degenerative  changes. No change.    L4-L5: Signal loss, minimal disc bulging. Moderate posterior facet  degenerative changes with subtle anterolisthesis. No acute disc  protrusion. Very mild central stenosis. Moderate left and mild right  foraminal stenosis. No change.    L5-S1: Signal loss with mild disc bulge which is asymmetric  posterolaterally on the left, potentially representing a small  broad-based left central disc protrusion. This may be associated with  a small annular fissure. There is some mild indentation on the left  anterolateral thecal sac but there is no central stenosis. No  significant foraminal stenosis. Minimal posterior facet degenerative  changes. No  change.    Paraspinal soft tissues are unremarkable.      Impression    IMPRESSION:   1. No significant change since prior CT exam of 2/25/2019.  2. Multilevel early degenerative disc disease. Mild central stenosis  at L2-L3 related to multiple factors.  3. Mild foraminal stenosis at multiple levels bilaterally as described  above.  4. Chronic wedge compression deformity L1.    AKIN DORANTES MD         Critical Care time:  none               No results found for this or any previous visit (from the past 24 hour(s)).    Medications   lactated ringers BOLUS 1,000 mL (0 mLs Intravenous Stopped 3/9/19 1404)   HYDROmorphone (PF) (DILAUDID) injection 0.5 mg (0.5 mg Intravenous Given 3/9/19 1014)       Assessments & Plan (with Medical Decision Making)   58-year-old male past medical history reviewed as above who presents to the emergency department with concerns of ongoing concerns of pain in his neck and low back area above baseline ongoing subacute/chronic concerns of paresthesias in the upper and lower extremities, and weakness as discussed in the HPI.  Physical exam finds him alert and in no acute distress vital signs are within adult normal limits at time of presentation the patient is at times difficult on exam, uncooperative and occasionally belligerent and refuses to cooperate with much of the neurologic exam limiting his evaluation.  Given the increased pain above baseline with a history of recent fall imaging studies were again obtained including MR cervical spine as well as MR lumbar spine without contrast.  There were no significant changes from previous studies.  Hemoglobin is stable and consistent with previous, slightly low potassium at 3.3 creatinine at 2.34 is approximately average baseline for the patient.  Elevated total bilirubin and alkaline phosphatase are also consistent with previous elevations.  Urinalysis consistent with infection.   I suspect that much of the patient's presentation here today  is for chronic concerns, he does have evidence for acute infection which may be contributing to his weakness will certainly treat that.  He has made appointments in the past and not Them for appropriate follow-up for his back pain and paresthesias and I have encouraged him to do so in the future and to use the emergency department for emergent concerns.  All questions were answered disposition is to home.      Disclaimer: This note consists of symbols derived from keyboarding, dictation and/or voice recognition software. As a result, there may be errors in the script that have gone undetected. Please consider this when interpreting information found in this chart.      I have reviewed the nursing notes.    I have reviewed the findings, diagnosis, plan and need for follow up with the patient.          Medication List      Started    ciprofloxacin 500 MG tablet  Commonly known as:  CIPRO  500 mg, Oral, 2 TIMES DAILY        ASK your doctor about these medications    ondansetron 4 MG ODT tab  Commonly known as:  ZOFRAN ODT  4 mg, Oral, EVERY 8 HOURS PRN  Ask about: Should I take this medication?     oxyCODONE-acetaminophen 5-325 MG tablet  Commonly known as:  PERCOCET  1-2 tablets, Oral, EVERY 4 HOURS PRN  Ask about: Should I take this medication?            Final diagnoses:   Generalized muscle weakness   Paresthesias - Bilateral upper and lower extremity.   UTI (urinary tract infection), bacterial   Chronic back pain, unspecified back location, unspecified back pain laterality       3/9/2019   [facility] EMERGENCY DEPARTMENT     Tolu Josue MD  03/12/19 1972

## 2019-03-09 NOTE — ED TRIAGE NOTES
Pt arrived by [facility] from home. Pt fell off a ladder 3 weeks ago and hit across his mid back and has had some blood in the urine and pain in both kidneys. Pt is coming in with new complaints of weakness in the lower extremities and numbness and tingling in the hands and feet.

## 2019-03-10 LAB
BACTERIA SPEC CULT: NORMAL
Lab: NORMAL
SPECIMEN SOURCE: NORMAL

## 2019-03-11 ENCOUNTER — TELEPHONE (OUTPATIENT)
Dept: CARE COORDINATION | Facility: CLINIC | Age: 59
End: 2019-03-11

## 2019-03-11 ENCOUNTER — TELEPHONE (OUTPATIENT)
Dept: FAMILY MEDICINE | Facility: CLINIC | Age: 59
End: 2019-03-11

## 2019-03-11 ENCOUNTER — PATIENT OUTREACH (OUTPATIENT)
Dept: CARE COORDINATION | Facility: CLINIC | Age: 59
End: 2019-03-11

## 2019-03-11 DIAGNOSIS — R53.1 GENERALIZED WEAKNESS: Primary | ICD-10-CM

## 2019-03-11 RX ORDER — BACLOFEN 10 MG/1
5-10 TABLET ORAL 3 TIMES DAILY
Qty: 90 TABLET | Refills: 3 | Status: SHIPPED | OUTPATIENT
Start: 2019-03-11 | End: 2019-05-02

## 2019-03-11 ASSESSMENT — ACTIVITIES OF DAILY LIVING (ADL): DEPENDENT_IADLS:: INDEPENDENT

## 2019-03-11 NOTE — TELEPHONE ENCOUNTER
Patient notified he needs an office appointment for follow up on ED visits and to discuss pain medication.  Patient verbalized understanding  Scheduled ED follow up visit on 3/15/19    Racquel ESTRADA Rn

## 2019-03-11 NOTE — TELEPHONE ENCOUNTER
Reason for Call:  Other appointment    Detailed comments: Pts brother Zoran calling. Pt was discharged from the hospital and told to follow up with pcp in 10 to 14 days. He was also told to see Neurologist. Which should they do first?  Pts legs swelled up. Zoran is with patient now. No permission to communicate on file.    Phone Number Patient can be reached at: [place] [phone]    Best Time: any    Can we leave a detailed message on this number?     Call taken on 3/11/2019 at 11:17 AM by Rena Kaur

## 2019-03-11 NOTE — TELEPHONE ENCOUNTER
Requested Prescriptions   Pending Prescriptions Disp Refills     baclofen (LIORESAL) 10 MG tablet 90 tablet 1     Sig: Take 1/2 to one tablet up to three times a day    There is no refill protocol information for this order   Last Written Prescription Date:  1/7/19  Last Fill Quantity: 90 tab,  # refills: 1   Last office visit:  previous visit found with prescribing provider:  2/1/19 Chidi Valenzuela    Future Office Visit:

## 2019-03-11 NOTE — TELEPHONE ENCOUNTER
Please call patient to schedule ED follow up appointment. Thank you    Lia Zaragoza R.N.  Clinic Care Coordinator  [facility]  [phone]

## 2019-03-11 NOTE — TELEPHONE ENCOUNTER
Routing refill request to provider for review/approval because:  Drug not on the [facility] refill protocol     Angelina HDZ RN

## 2019-03-11 NOTE — TELEPHONE ENCOUNTER
Signed Prescriptions:                        Disp   Refills    baclofen (LIORESAL) 10 MG tablet           90 tab*3        Sig: Take 0.5-1 tablets (5-10 mg) by mouth 3 times daily           Take 1/2 to one tablet up to three times a day  Authorizing Provider: LANDEN JETT    Reviewed, signed, e-prescribed.    Landen Quiñones MD  [facility]

## 2019-03-11 NOTE — LETTER
Health Care Home - Access Care Plan    About Me  Patient Name:  Abhijeet Mariscal    YOB: 1960  Age:                             58 year old   Haresh MRN:            [id number] Telephone Information:   Home Phone [phone]   Mobile [phone]       Address:    [address] [place] [address] Email address:  No e-mail address on record      Emergency Contact(s)  Name Relationship Lgl Grd Work Phone Home Phone Mobile Phone   1. RICHARD PEOPLES Sister    [phone]   2. MONIE MARISCAL Other   [phone]    3. HERMELINDA MUNOZ Mother   [phone]              Health Maintenance: Routine Health maintenance Reviewed: Due/Overdue   Health Maintenance Due   Topic Date Due     PREVENTIVE CARE VISIT  1960     SPIROMETRY ONETIME  1960     COPD ACTION PLAN Q1 YR  01/09/1959     HIV SCREEN (SYSTEM ASSIGNED)  12/09/1976     HEPATITIS C SCREENING  12/09/1976     ZOSTER IMMUNIZATION (1 of 2) 12/09/2008     ADVANCE DIRECTIVE PLANNING Q5 YRS  12/09/2013     LIPID SCREEN Q5 YR MALE (SYSTEM ASSIGNED)  09/01/2014     ASTHMA ACTION PLAN Q1 YR  03/28/2019         My Access Plan  Medical Emergency 911   Questions or concerns during clinic hours Primary Clinic Line, I will call the clinic directly: [facility] - [phone]   24 Hour Appointment Line [phone] or  [phone] [place] [phone]) (toll free)   24 Hour Nurse Line [phone] (toll free)   Questions or concerns outside clinic hours 24 Hour Appointment Line, I will call the after-hours on-call line:   [facility] [phone] or [phone]) (toll-free)   Preferred Urgent Care [facility] - [place], [phone]   Preferred [facility] [place], [place]  [phone]   Preferred [facility] - [place] - [address]     Behavioral Health Crisis Line The National Suicide Prevention Lifeline at 1-787.176.6167 or 911     My Care Team  Members  Patient Care Team       Relationship Specialty Notifications Start End    Chidi Valenzuela MD PCP - General Internal Medicine  12/5/18     Phone: [phone] Fax: [phone] [address] [facility] [address]    Chidi Valenzuela MD Assigned PCP   12/9/18     Phone: [phone] Fax: [phone] [address] [facility] [address]    Kellie Putnam, RN Specialty Care Coordinator Oncology Admissions 2/22/19     Phone: [phone]         Juana Zaragoza, RN Clinic Care Coordinator Primary Care - CC Admissions 3/11/19 3/11/19    Phone: [phone] Fax: [phone]               My Medical and Care Information  Problem List   Patient Active Problem List   Diagnosis     Essential hypertension     Acute myeloid leukemia in remission (H)     Sensorineural hearing loss, asymmetrical     Alcoholic cirrhosis of liver (H)     Acute alcoholic hepatitis     Coagulation defect (H)     Osteoarthrosis     Thrombocytopenia (H)     Universal ulcerative (chronic) colitis(556.6) (H)     CKD (chronic kidney disease) stage 3, GFR 30-59 ml/min (H)     Rosacea     Mild intermittent asthma without complication     Peptic ulcer disease     Uncomplicated alcohol dependence (H)     Tobacco dependence syndrome     Tubular adenoma of colon     Normocytic anemia     Leukocytosis with increased monocytes     Generalized weakness     COPD exacerbation (H)      Current Medications and Allergies:  See printed Medication Report

## 2019-03-11 NOTE — PROGRESS NOTES
Clinic Care Coordination Contact    Clinic Care Coordination Contact  OUTREACH    Referral Information:  Referral Source: ED Follow-Up         Chief Complaint   Patient presents with     Clinic Care Coordination - [facility]     RN        [place] Utilization:   Clinic Utilization  Difficulty keeping appointments:: Yes  Compliance Concerns: Yes  No-Show Concerns: No  No PCP office visit in Past Year: No  Utilization    Last refreshed: 3/11/2019 10:53 AM:  Hospital Admissions 2           Last refreshed: 3/11/2019 10:53 AM:  ED Visits 3           Last refreshed: 3/11/2019 10:53 AM:  No Show Count (past year) 4              Current as of: 3/11/2019 10:53 AM              Clinical Concerns:  Current Medical Concerns:  Bilateral leg and back pain    Current Behavioral Concerns: none    Education Provided to patient: RICE   Pain  Pain (GOAL):: Yes  Type: Acute (<3mo)  Location of chronic pain:: legs back  Radiating: Yes  Progression: Intermittent  Chronic pain severity:: 10  Limitation of routine activities due to chronic pain:: Yes  Alleviating Factors: Exercise  Aggravating Factors: Activity  Health Maintenance Reviewed: Due/Overdue   Health Maintenance Due   Topic Date Due     PREVENTIVE CARE VISIT  1960     SPIROMETRY ONETIME  1960     COPD ACTION PLAN Q1 YR  01/09/1959     HIV SCREEN (SYSTEM ASSIGNED)  12/09/1976     HEPATITIS C SCREENING  12/09/1976     ZOSTER IMMUNIZATION (1 of 2) 12/09/2008     ADVANCE DIRECTIVE PLANNING Q5 YRS  12/09/2013     LIPID SCREEN Q5 YR MALE (SYSTEM ASSIGNED)  09/01/2014     ASTHMA ACTION PLAN Q1 YR  03/28/2019       Clinical Pathway: None    Medication Management:  Taking ibuprofen and will add tylenol. Wants pain med from PCP     Functional Status:  Dependent ADLs:: Independent  Dependent IADLs:: Independent  Bed or wheelchair confined:: No  Mobility Status: Independent  Fallen 2 or more times in the past year?: Yes  Any fall with injury in the past year?: Yes    Living  Situation:       Diet/Exercise/Sleep:  Tube Feeding: No  Exercise:: Unable to exercise    Transportation:  Transportation concerns (GOAL):: No  Transportation means:: Medical transport     Psychosocial:  Restorationism or spiritual beliefs that impact treatment:: No  Mental health DX:: No  Mental health management concern (GOAL):: No  Informal Support system:: Other([facility] care coordinator)     Financial/Insurance:   Financial/Insurance concerns (GOAL):: Yes  Food stamps got cut. [facility] care coordinator coming on Wednesday to evaluate for services and to bring him a wheelchair     Resources and Interventions:  Current Resources:    ;   Community Resources: Other (see comment), County Worker([facility] care coordinator)  Supplies used at home:: None  Equipment Currently Used at Home: none    Advance Care Plan/Directive  Advanced Care Plans/Directives on file:: No    Referrals Placed: None     Goals:         Patient/Caregiver understanding: good       Future Appointments              In 1 week [facility]          Plan: RICE reviewed with patient. Will send message to team to schedule ED follow up. Will send request to PCP for pain medication per patient's request. Patient agrees with plan.     Lia Zaragoza R.N.  Clinic Care Coordinator  [facility] Primary [facility]  [phone]

## 2019-03-11 NOTE — LETTER
[place] CARE COORDINATION    March 11, 2019    Abhijeet Coreyafson  [address] [place] [address]      Dear Abhijeet,    I am a clinic care coordinator who works with Chidi Valenzuela MD at [facility]. I wanted to introduce myself and provide you with my contact information so that you can call me with questions or concerns about your health care. Below is a description of clinic care coordination and how I can further assist you.     The clinic care coordinator is a registered nurse and/or  who understand the health care system. The goal of clinic care coordination is to help you manage your health and improve access to the [facility] system in the most efficient manner. The registered nurse can assist you in meeting your health care goals by providing education, coordinating services, and strengthening the communication among your providers. The  can assist you with financial, behavioral, psychosocial, chemical dependency, counseling, and/or psychiatric resources.    Please feel free to contact me at [phone], with any questions or concerns. We at [facility] are focused on providing you with the highest-quality healthcare experience possible and that all starts with you.     Sincerely,     Lia Zaragoza    Enclosed: I have enclosed a copy of a 24 Hour Access Plan. This has helpful phone numbers for you to call when needed. Please keep this in an easy to access place to use as needed.

## 2019-03-11 NOTE — TELEPHONE ENCOUNTER
Please see ED note and imaging report. Patient is taking ibuprofen for back pain but it is not helping. Patient is asking for PCP to write a prescription for a stronger pain medication. Patient states that he has not had any alcohol for the past 2 weeks but this is in conflict with what the ED doctor noted in his visit.     Lia Zaragoza R.N.  Clinic Care Coordinator  [facility] Primary [facility]  [phone]

## 2019-03-11 NOTE — TELEPHONE ENCOUNTER
Patient reports:  He was in ED on 3/9/19, he is to follow up with his doctor with in 10-14 days and also see a neurologist.  Patient is meeting with a [facility] today.  He will call and make an appointment with Dr. Valenzuela after he meets with [facility].  He will discuss referral for neurologist with Dr. Valenzuela and recommendations for PT that was ordered by ED MD.

## 2019-03-13 ENCOUNTER — TRANSFERRED RECORDS (OUTPATIENT)
Dept: HEALTH INFORMATION MANAGEMENT | Facility: CLINIC | Age: 59
End: 2019-03-13

## 2019-03-14 NOTE — PROGRESS NOTES
"  SUBJECTIVE:   Abhijeet Mccauley is a 58 year old male who presents to clinic today for the following health issues:    Chief Complaint   Patient presents with     ER F/U     worse, can't stand, weakness in leggs, hands are numb. Coughing up mucus, rattling in chest.      Rash     rash across [place] for 3 days.        Abhijeet was seen in the ED on 2/25 after falling off of a ladder with possible LOC.  He had a bunch of imaging of the head and spine and chest that did not show acute abnormalities.  He was discharged with a small prescription for Percocet, 8 tabs.  He returned to the ED on 3/5 with vomiting and diarrhea and had been drinking alcohol.  He was treated for pain in the ED and given 6 tabs of Percocet and 10 of Zofran.  He was referred to PT.  Labs showed no acute change.  He was back in the ED on 3/9 with weakness.  MRI of the C and L spine were done that were stable.  Labs were significant with a UA indicative of infection and he was started on Cipro.  Culture grew mixed juani. Yeast were noted on UA.     He can't stand for more than a minute.  Hands and feet are numb.  Back is \"just trashed\".  Has pain in \"everything, all the way down to my toes\".  No joint swelling or redness that he's noticed.  No fevers or chills.  Having headaches.  He is having wheezing and cough, this started awhile ago, he doesn't remember if he had symptoms on 2/25 when he had the chest CT or not.      Developed a non-itchy rash on the face 3 days ago, no pain.  Rash worsening.      Last alcohol use was 2 weeks ago.      He is using ibuprofen for the pain- takes this 2-200mg pills in the morning and 2 in the evening, this doesn't help.        Problem list and histories reviewed & adjusted, as indicated.  Additional history: as documented    Patient Active Problem List   Diagnosis     Essential hypertension     Acute myeloid leukemia in remission (H)     Sensorineural hearing loss, asymmetrical     Alcoholic cirrhosis of liver " (H)     Acute alcoholic hepatitis     Coagulation defect (H)     Osteoarthrosis     Thrombocytopenia (H)     Universal ulcerative (chronic) colitis(556.6) (H)     CKD (chronic kidney disease) stage 3, GFR 30-59 ml/min (H)     Rosacea     Mild intermittent asthma without complication     Peptic ulcer disease     Uncomplicated alcohol dependence (H)     Tobacco dependence syndrome     Tubular adenoma of colon     Normocytic anemia     Leukocytosis with increased monocytes     Generalized weakness     COPD exacerbation (H)     Past Surgical History:   Procedure Laterality Date     AS TOTAL KNEE ARTHROPLASTY Left      BONE MARROW BIOPSY, BONE SPECIMEN, NEEDLE/TROCAR N/A 6/12/2018    Procedure: BIOPSY BONE MARROW;  Bone Marrow Biopsy;  Surgeon: Demar Sapp MD;  Location: [facility]     ESOPHAGOSCOPY, GASTROSCOPY, DUODENOSCOPY (EGD), COMBINED N/A 2/8/2018    Procedure: COMBINED ESOPHAGOSCOPY, GASTROSCOPY, DUODENOSCOPY (EGD), BIOPSY SINGLE OR MULTIPLE;  gastroscopy with biopsies;  Surgeon: Raz Cobb MD;  Location: [facility]     ESOPHAGOSCOPY, GASTROSCOPY, DUODENOSCOPY (EGD), COMBINED N/A 3/18/2018    Procedure: COMBINED ESOPHAGOSCOPY, GASTROSCOPY, DUODENOSCOPY (EGD);;  Surgeon: Raz Cobb MD;  Location: [facility]     LACERATION REPAIR Right     Right leg       Social History     Tobacco Use     Smoking status: Current Every Day Smoker     Packs/day: 0.50     Years: 30.00     Pack years: 15.00     Types: Cigarettes     Smokeless tobacco: Never Used     Tobacco comment: I strongly emphasized the importance of quitting smoking. 4-5 daily   Substance Use Topics     Alcohol use: Yes     Comment: Down to 3 beers per day.  Sometimes a cocktail also.     Family History   Problem Relation Age of Onset     Hypertension Mother      Coronary Artery Disease Father         MI         Current Outpatient Medications   Medication Sig Dispense Refill     baclofen (LIORESAL) 10 MG tablet Take 0.5-1 tablets (5-10 mg) by mouth 3  times daily Take 1/2 to one tablet up to three times a day 90 tablet 3     ciprofloxacin (CIPRO) 500 MG tablet Take 1 tablet (500 mg) by mouth 2 times daily for 10 days 20 tablet 0     desonide (DESOWEN) 0.05 % external lotion Apply topically as needed 118 mL 1     furosemide (LASIX) 20 MG tablet Take 1 tablet (20 mg) by mouth every morning 30 tablet 11     levETIRAcetam (KEPPRA) 250 MG tablet Take 1 tablet (250 mg) by mouth 2 times daily (Patient taking differently: Take 500 mg by mouth every morning ) 60 tablet 1     loratadine (CLARITIN) 10 MG tablet Take 1 tablet by mouth daily as needed for allergies. 30 tablet 6     mometasone-formoterol (DULERA) 200-5 MCG/ACT oral inhaler Inhale 2 puffs into the lungs 2 times daily        order for DME Equipment being ordered: shower chair 1 Device 0     order for DME Equipment being ordered: Toilet seat riser 1 Device 0     pantoprazole (PROTONIX) 40 MG EC tablet TAKE ONE TABLET BY MOUTH EVERY MORNING BEFORE BREAKFAST 30 tablet 11     potassium chloride SA (K-DUR/KLOR-CON M) 10 MEQ CR tablet Take 1 tablet (10 mEq) by mouth daily 90 tablet 3     propranolol (INDERAL) 20 MG tablet Take 1 tablet (20 mg) by mouth 2 times daily (Patient taking differently: Take 40 mg by mouth every morning ) 180 tablet 1     SODIUM BICARBONATE PO Take 650 mg by mouth daily as needed        tiotropium (SPIRIVA) 18 MCG capsule Inhale 1 capsule into the lungs daily Using PRN       traZODone (DESYREL) 50 MG tablet Take 1 tablet (50 mg) by mouth At Bedtime 90 tablet 3     VENTOLIN  (90 Base) MCG/ACT inhaler INHALE 2 PUFFS INTO THE LUNGS EVERY 4 HOURS AS NEEDED 18 g 11     Allergies   Allergen Reactions     Blood Transfusion Related (Informational Only)      Patient has a history of a clinically significant antibody against RBC antigens.  A delay in compatible RBCs may occur.     Famotidine Unknown and Other (See Comments)     Severe abdominal cramps     Cyclobenzaprine Hives     Gabapentin  "Other (See Comments)     Made pt's \"face break out\"     Methocarbamol Other (See Comments)     Made pt's \"face break out\"     Pepcid Cramps     Severe abdominal cramps     Vancomycin Other (See Comments)     hallucinations     Vfend Other (See Comments)     IV - cold sweats, Iron tablet makes him nauseated and stomach ached     Hydrocodone-Acetaminophen Rash       Reviewed and updated as needed this visit by clinical staff  Tobacco  Allergies  Meds  Med Hx  Surg Hx  Fam Hx  Soc Hx      Reviewed and updated as needed this visit by Provider         ROS:  Constitutional, cardiovascular, pulmonary, MSK, and gu systems are negative, except as otherwise noted.    OBJECTIVE:     /70   Pulse 72   Temp 97.4  F (36.3  C) (Tympanic)   Ht 1.676 m (5' 6\")   Wt 79.6 kg (175 lb 6.4 oz)   BMI 28.31 kg/m    Body mass index is 28.31 kg/m .  GENERAL: sitting in wheelchair, alert and no distress  RESP: left sided wheezes, normal effort  CV: regular rate and rhythm, normal S1 S2, no S3 or S4, no murmur, click or rub, no peripheral edema  MS: bilateral hand  weakness, weakness at both hips to flexion but more so on the left, unable to flex/extend left knee, plantar and dorsiflexion strength is normal    Diagnostic Test Results:  Results for orders placed or performed in visit on 03/15/19 (from the past 24 hour(s))   Erythrocyte sedimentation rate auto   Result Value Ref Range    Sed Rate 73 (H) 0 - 20 mm/h   CRP inflammation   Result Value Ref Range    CRP Inflammation 33.8 (H) 0.0 - 8.0 mg/L   CK total   Result Value Ref Range    CK Total 42 30 - 300 U/L   Alcohol ethyl   Result Value Ref Range    Ethanol g/dL <0.01 <0.01 g/dL   CBC with platelets   Result Value Ref Range    WBC 9.7 4.0 - 11.0 10e9/L    RBC Count 3.45 (L) 4.4 - 5.9 10e12/L    Hemoglobin 9.2 (L) 13.3 - 17.7 g/dL    Hematocrit 30.3 (L) 40.0 - 53.0 %    MCV 88 78 - 100 fl    MCH 26.7 26.5 - 33.0 pg    MCHC 30.4 (L) 31.5 - 36.5 g/dL    RDW 20.3 (H) 10.0 " - 15.0 %    Platelet Count 142 (L) 150 - 450 10e9/L   Comprehensive metabolic panel   Result Value Ref Range    Sodium 131 (L) 133 - 144 mmol/L    Potassium 3.9 3.4 - 5.3 mmol/L    Chloride 100 94 - 109 mmol/L    Carbon Dioxide 22 20 - 32 mmol/L    Anion Gap 9 3 - 14 mmol/L    Glucose 98 70 - 99 mg/dL    Urea Nitrogen 23 7 - 30 mg/dL    Creatinine 1.95 (H) 0.66 - 1.25 mg/dL    GFR Estimate 36 (L) >60 mL/min/[1.73_m2]    GFR Estimate If Black 42 (L) >60 mL/min/[1.73_m2]    Calcium 9.4 8.5 - 10.1 mg/dL    Bilirubin Total 1.6 (H) 0.2 - 1.3 mg/dL    Albumin 3.5 3.4 - 5.0 g/dL    Protein Total 7.7 6.8 - 8.8 g/dL    Alkaline Phosphatase 220 (H) 40 - 150 U/L    ALT 28 0 - 70 U/L    AST 37 0 - 45 U/L   XR Chest 2 Views    Narrative    XR CHEST 2 VW 3/15/2019 1:56 PM    HISTORY: Cough.    COMPARISON: Several prior studies, the most recent one being dated  1/17/2019.      Impression    IMPRESSION: 2 views of the chest show no acute cardiopulmonary  disease. Heart size is upper normal.     LISA KILGORE MD   *UA reflex to Microscopic and Culture ([place] [facility] [facility] (except [place] and [place])   Result Value Ref Range    Color Urine Yellow     Appearance Urine Cloudy     Glucose Urine Negative NEG^Negative mg/dL    Bilirubin Urine Negative NEG^Negative    Ketones Urine Negative NEG^Negative mg/dL    Specific Gravity Urine 1.015 1.003 - 1.035    Blood Urine Small (A) NEG^Negative    pH Urine 6.0 5.0 - 7.0 pH    Protein Albumin Urine Negative NEG^Negative mg/dL    Urobilinogen Urine 0.2 0.2 - 1.0 EU/dL    Nitrite Urine Negative NEG^Negative    Leukocyte Esterase Urine Large (A) NEG^Negative    Source Midstream Urine    Urine Microscopic   Result Value Ref Range    WBC Urine  (A) OTO5^0 - 5 /HPF    RBC Urine 5-10 (A) OTO2^O - 2 /HPF    Squamous Epithelial /LPF Urine Few FEW^Few /LPF    Bacteria Urine Few (A) NEG^Negative /HPF       ASSESSMENT/PLAN:         1. Generalized muscle weakness    Abhijeet presents with  ongoing worsening generalized weakness.  He has had prior workup in the emergency room as noted above.  We did further workup as noted below.  His inflammatory markers are rather elevated, and I wonder if his diffuse pains may be related to PMR.  I am not sure this entirely explains his weakness.  Vitamin B1 level is still in process.  TSH and B12 levels were normal a few months ago, so these were not rechecked.  He requested a wheelchair to bring home, and social work assisted with getting him want to rent.  Home care referral placed due to his reports of severe weakness inhibiting his ability to do care for self at home.    ADDENDUM:  Labs do show low thiamine.  Thiamine deficiency may be causing neuropathy.  Will start supplementation and recheck level in a couple of months.      - Erythrocyte sedimentation rate auto  - CRP inflammation  - *UA reflex to Microscopic and Culture ([facility] (except [place] and [place])  - Vitamin B1 whole blood  - CK total  - XR Chest 2 Views  - Alcohol ethyl  - CBC with platelets  - Comprehensive metabolic panel  - CARE COORDINATION REFERRAL  - Urine Microscopic  - order for DME; Equipment being ordered: Wheelchair  Dispense: 1 each; Refill: 0  - HOME CARE NURSING REFERRAL    2. PMR (polymyalgia rheumatica) (H)    We will do a trial of prednisone to see if his symptoms are responsive, which would be supportive of PMR.  He should follow-up in clinic next week for reassessment.    - predniSONE (DELTASONE) 20 MG tablet; Take 20 mg by mouth daily for 14 days.  Dispense: 14 tablet; Refill: 0    3. Cough    He has been having cough and had wheezes on the left side of his lungs, so we did a chest x-ray to look for infection, however this was negative for acute infiltrate.  May have a viral infection.  The prednisone may be helpful for the wheezing.    - XR Chest 2 Views    4. Uncomplicated alcohol dependence (H)    He was not able to get transportation down to the  clinic where the addiction medicine clinic is located, so I placed a referral to social work to see if we might be able to find a treatment program closer by.    - CARE COORDINATION REFERRAL    5. Nonspecific finding on examination of urine    He has been on Cipro for UA that indicate infection.  He has not been having clear symptoms of UTI.  His urine today is much improved, so we will stop the Cipro since we are going to be starting on the prednisone due to potential interactions with these medications.  We will follow-up on culture results to see if we need to start a second antibiotic if this is still positive.    - Urine Culture Aerobic Bacterial    Chart documentation was done using Dragon dictation software. Although reviewed after completion, some errors may remain.     More than 50% of this 40  minute face-to-face appointment was spent on counseling and coordination of care of the above.      Chidi Valenzuela MD  [facility]

## 2019-03-15 ENCOUNTER — OFFICE VISIT (OUTPATIENT)
Dept: FAMILY MEDICINE | Facility: CLINIC | Age: 59
End: 2019-03-15
Payer: COMMERCIAL

## 2019-03-15 ENCOUNTER — ANCILLARY PROCEDURE (OUTPATIENT)
Dept: GENERAL RADIOLOGY | Facility: CLINIC | Age: 59
End: 2019-03-15
Attending: INTERNAL MEDICINE
Payer: COMMERCIAL

## 2019-03-15 ENCOUNTER — PATIENT OUTREACH (OUTPATIENT)
Dept: CARE COORDINATION | Facility: CLINIC | Age: 59
End: 2019-03-15

## 2019-03-15 VITALS
HEART RATE: 72 BPM | TEMPERATURE: 97.4 F | WEIGHT: 175.4 LBS | HEIGHT: 66 IN | SYSTOLIC BLOOD PRESSURE: 128 MMHG | BODY MASS INDEX: 28.19 KG/M2 | DIASTOLIC BLOOD PRESSURE: 70 MMHG

## 2019-03-15 DIAGNOSIS — M35.3 PMR (POLYMYALGIA RHEUMATICA) (H): ICD-10-CM

## 2019-03-15 DIAGNOSIS — R82.90 NONSPECIFIC FINDING ON EXAMINATION OF URINE: ICD-10-CM

## 2019-03-15 DIAGNOSIS — E51.11 THIAMINE DEFICIENCY NEUROPATHY: ICD-10-CM

## 2019-03-15 DIAGNOSIS — R05.9 COUGH: ICD-10-CM

## 2019-03-15 DIAGNOSIS — M62.81 GENERALIZED MUSCLE WEAKNESS: Primary | ICD-10-CM

## 2019-03-15 DIAGNOSIS — F10.20 UNCOMPLICATED ALCOHOL DEPENDENCE (H): ICD-10-CM

## 2019-03-15 LAB
ALBUMIN SERPL-MCNC: 3.5 G/DL (ref 3.4–5)
ALBUMIN UR-MCNC: NEGATIVE MG/DL
ALP SERPL-CCNC: 220 U/L (ref 40–150)
ALT SERPL W P-5'-P-CCNC: 28 U/L (ref 0–70)
ANION GAP SERPL CALCULATED.3IONS-SCNC: 9 MMOL/L (ref 3–14)
APPEARANCE UR: ABNORMAL
AST SERPL W P-5'-P-CCNC: 37 U/L (ref 0–45)
BACTERIA #/AREA URNS HPF: ABNORMAL /HPF
BILIRUB SERPL-MCNC: 1.6 MG/DL (ref 0.2–1.3)
BILIRUB UR QL STRIP: NEGATIVE
BUN SERPL-MCNC: 23 MG/DL (ref 7–30)
CALCIUM SERPL-MCNC: 9.4 MG/DL (ref 8.5–10.1)
CHLORIDE SERPL-SCNC: 100 MMOL/L (ref 94–109)
CK SERPL-CCNC: 42 U/L (ref 30–300)
CO2 SERPL-SCNC: 22 MMOL/L (ref 20–32)
COLOR UR AUTO: YELLOW
CREAT SERPL-MCNC: 1.95 MG/DL (ref 0.66–1.25)
CRP SERPL-MCNC: 33.8 MG/L (ref 0–8)
ERYTHROCYTE [DISTWIDTH] IN BLOOD BY AUTOMATED COUNT: 20.3 % (ref 10–15)
ERYTHROCYTE [SEDIMENTATION RATE] IN BLOOD BY WESTERGREN METHOD: 73 MM/H (ref 0–20)
ETHANOL SERPL-MCNC: <0.01 G/DL
GFR SERPL CREATININE-BSD FRML MDRD: 36 ML/MIN/{1.73_M2}
GLUCOSE SERPL-MCNC: 98 MG/DL (ref 70–99)
GLUCOSE UR STRIP-MCNC: NEGATIVE MG/DL
HCT VFR BLD AUTO: 30.3 % (ref 40–53)
HGB BLD-MCNC: 9.2 G/DL (ref 13.3–17.7)
HGB UR QL STRIP: ABNORMAL
KETONES UR STRIP-MCNC: NEGATIVE MG/DL
LEUKOCYTE ESTERASE UR QL STRIP: ABNORMAL
MCH RBC QN AUTO: 26.7 PG (ref 26.5–33)
MCHC RBC AUTO-ENTMCNC: 30.4 G/DL (ref 31.5–36.5)
MCV RBC AUTO: 88 FL (ref 78–100)
NITRATE UR QL: NEGATIVE
NON-SQ EPI CELLS #/AREA URNS LPF: ABNORMAL /LPF
PH UR STRIP: 6 PH (ref 5–7)
PLATELET # BLD AUTO: 142 10E9/L (ref 150–450)
POTASSIUM SERPL-SCNC: 3.9 MMOL/L (ref 3.4–5.3)
PROT SERPL-MCNC: 7.7 G/DL (ref 6.8–8.8)
RBC # BLD AUTO: 3.45 10E12/L (ref 4.4–5.9)
RBC #/AREA URNS AUTO: ABNORMAL /HPF
SODIUM SERPL-SCNC: 131 MMOL/L (ref 133–144)
SOURCE: ABNORMAL
SP GR UR STRIP: 1.01 (ref 1–1.03)
UROBILINOGEN UR STRIP-ACNC: 0.2 EU/DL (ref 0.2–1)
WBC # BLD AUTO: 9.7 10E9/L (ref 4–11)
WBC #/AREA URNS AUTO: ABNORMAL /HPF

## 2019-03-15 PROCEDURE — 80053 COMPREHEN METABOLIC PANEL: CPT | Performed by: INTERNAL MEDICINE

## 2019-03-15 PROCEDURE — 85027 COMPLETE CBC AUTOMATED: CPT | Performed by: INTERNAL MEDICINE

## 2019-03-15 PROCEDURE — 84425 ASSAY OF VITAMIN B-1: CPT | Mod: 90 | Performed by: INTERNAL MEDICINE

## 2019-03-15 PROCEDURE — 80320 DRUG SCREEN QUANTALCOHOLS: CPT | Performed by: INTERNAL MEDICINE

## 2019-03-15 PROCEDURE — 86140 C-REACTIVE PROTEIN: CPT | Performed by: INTERNAL MEDICINE

## 2019-03-15 PROCEDURE — 99214 OFFICE O/P EST MOD 30 MIN: CPT | Performed by: INTERNAL MEDICINE

## 2019-03-15 PROCEDURE — 36415 COLL VENOUS BLD VENIPUNCTURE: CPT | Performed by: INTERNAL MEDICINE

## 2019-03-15 PROCEDURE — 99000 SPECIMEN HANDLING OFFICE-LAB: CPT | Performed by: INTERNAL MEDICINE

## 2019-03-15 PROCEDURE — 81001 URINALYSIS AUTO W/SCOPE: CPT | Performed by: INTERNAL MEDICINE

## 2019-03-15 PROCEDURE — 87086 URINE CULTURE/COLONY COUNT: CPT | Performed by: INTERNAL MEDICINE

## 2019-03-15 PROCEDURE — 82550 ASSAY OF CK (CPK): CPT | Performed by: INTERNAL MEDICINE

## 2019-03-15 PROCEDURE — 71046 X-RAY EXAM CHEST 2 VIEWS: CPT

## 2019-03-15 PROCEDURE — 85652 RBC SED RATE AUTOMATED: CPT | Performed by: INTERNAL MEDICINE

## 2019-03-15 RX ORDER — PREDNISONE 20 MG/1
20 TABLET ORAL DAILY
Qty: 14 TABLET | Refills: 0 | Status: SHIPPED | OUTPATIENT
Start: 2019-03-15 | End: 2019-05-02

## 2019-03-15 ASSESSMENT — MIFFLIN-ST. JEOR: SCORE: 1548.36

## 2019-03-15 NOTE — PATIENT INSTRUCTIONS
Take the prednisone to help with rash, breathing, and pain.  See me next week to see how things are going.

## 2019-03-15 NOTE — PROGRESS NOTES
Clinic Care Coordination Contact    Clinic Care Coordination Contact  OUTREACH    Referral Information:  Referral Source: PCP    TAI was approached by [facility] to assist with pt's needs as pt informed clinic staff he does not have a ride home, needs a wheelchair & cannot get from his ride to his home.    CRYSTAL met with pt initially in the lobby to discuss that he has DONNAkiran MA and can call [employer] [phone] for a ride home.  Pt informed this writer he has a ride, but cannot physically get from the car to his home due to weakness.  Pt requested to bring home the wheelchair he was sitting in.  SW declined that request, but offered a prescription for a wheelchair in it's place.    [facility] spoke with PCP, Clinic Administration & Pt Care Supervisor for direction.  PCP wrote Home Care orders & a prescription for a wheelchair.      [facility] brought pt back to a room to brainstormed with pt and his brother on how to get the pt from the ride into his home.  Pt stated he wanted to be admitted to a TCU as he cannot manage self at home.  Pt shared that he has been in/out of the ED 2x in the past week due to his pain, with no resolution.    Pt, brother and SW discussed that admission into a TCU for cares is unlikely to happen today, unless this pt wishes for SW to find a TCU in the [facility] area.  [place] even to stay with his brother.  Pt said he has no one at his home to help him navigate from car into home.      NIKO again conferred with Clinic Admin & direction was to have pt & family determine how he is going to get into his home or present to the ED for further evaluation/cares.    NIKO discussed with pt and brother options of calling friends, family , neighbors to assist him in the home.  NIKO then placed call to [facility] and discovered there was 1 wheelchair in the store that the pt can rent for $59/week.  Pt said he would rent the wheelchair for a week and confer with his [facility] , Eleanor,  once he was home.  Pt did not have Eleanor's phone number with him to give to this writer.    NIKO directed pt to [employer] store to  rented wheelchair.  Pt and brother have a ride home & stated they will figure out how to get the pt in the house using the rented wheelchair.    NIKO placed call to [facility] to search for pt's are CM and was informed that [facility] [phone].  NIKO placed call and left a message requesting to speak with pt's Case Manger.  NIKO left a message on Do Christopher's voicemail.      Chief Complaint   Patient presents with     Clinic Care Coordination - Face To Face     social work      Universal Utilization: Clinic Utilization  Difficulty keeping appointments:: Yes  Compliance Concerns: Yes  No-Show Concerns: No  No PCP office visit in Past Year: No  Utilization    Last refreshed: 3/15/2019  3:25 PM:  Hospital Admissions 2           Last refreshed: 3/15/2019  3:25 PM:  ED Visits 3           Last refreshed: 3/15/2019  3:25 PM:  No Show Count (past year) 4              Current as of: 3/15/2019  3:25 PM            Clinical Concerns:  Current Medical Concerns:  See above    Current Behavioral Concerns: NA    Education Provided to patient: See above     Pain  Pain (GOAL):: Yes  Type: Chronic (>3mo)  Location of chronic pain:: legs, back  Radiating: Yes  Location pain radiates to: all over  Progression: Worsening  Chronic pain severity:: 10  Limitation of routine activities due to chronic pain:: Yes  Description: Unable to perform most daily activities (chores, hobbies, social activities, driving)(Pt states he is unable to walk)  Aggravating Factors: Activity, Inactivity    Health Maintenance Reviewed: Due/Overdue:  Health Maintenance Due   Topic Date Due     PREVENTIVE CARE VISIT  1960     SPIROMETRY ONETIME  1960     COPD ACTION PLAN Q1 YR  01/09/1959     HIV SCREEN (SYSTEM ASSIGNED)  12/09/1976     HEPATITIS C SCREENING  12/09/1976     ZOSTER  IMMUNIZATION (1 of 2) 12/09/2008     ADVANCE DIRECTIVE PLANNING Q5 YRS  12/09/2013     LIPID SCREEN Q5 YR MALE (SYSTEM ASSIGNED)  09/01/2014     ASTHMA ACTION PLAN Q1 YR  03/28/2019     Clinical Pathway: None    Medication Management:  Pt states he takes on his own.     Functional Status:  Bed or wheelchair confined:: Yes  Mobility Status: Dependent/Assisted by Another  Fallen 2 or more times in the past year?: Yes  Any fall with injury in the past year?: Yes    Living Situation:  Current living arrangement:: I live in a private home with family  Type of residence:: Private home - stairs    Diet/Exercise/Sleep:  Diet:: Regular  Inadequate nutrition (GOAL):: No  Food Insecurity: No  Tube Feeding: No  Exercise:: Unable to exercise    Transportation:  Transportation concerns (GOAL):: No([employer] [phone])  Transportation means:: Medical transport     Psychosocial:  Alevism or spiritual beliefs that impact treatment:: No  Mental health DX:: No  Mental health management concern (GOAL):: No  Informal Support system:: Parent, Other, Family([facility] care coordinator)     Financial/Insurance:  [employer], [employer] MA  Financial/Insurance concerns (GOAL):: No       Resources and Interventions:  Current Resources:  Care   List of home care services:: Skilled Nursing, Physicial Therapy, Occupational Therapy;   Community Resources: County Worker and [facility] care coordinator)  Supplies used at home:: None  Equipment Currently Used at Home: walker, standard, wheelchair, manual    Advance Care Plan/Directive  Advanced Care Plans/Directives on file:: No    Referrals Placed: [facility] for [facility]    Goals        General    Functional (pt-stated)     Notes - Note created  3/15/2019  4:24 PM by Leonor Han LSW    Goal Statement: I need help getting services for Home Care, DME and to know why I can't walk  Measure of Success: Pt will better understand his medical dx  Supportive Steps to Achieve: Sw discussed with  PCP and Home Care orders were placed to evaluate pt in his home.  Barriers: Pt has hx of alcoholism, has been abrupt with staff, and not consistent with his follow up/cares  Strengths: Pt and his brother are asking for assistance with services.  Date to Achieve By: 1 month  Patient expressed understanding of goal: Yes              Patient/Caregiver understanding: Pt is aware that this writer will call to speak with his [facility] CM to try to determine pt' plan of care    Outreach Frequency: 2 weeks  Future Appointments              In 6 days Ai Min AuD [facility]    In 1 week Chidi Valenzuela MD [facility]        Plan: SW to follow.  Awaiting return call from [facility] .    .yfn Ortiz  Social Work Care Coordinator  [facility]  [phone]

## 2019-03-16 LAB
BACTERIA SPEC CULT: NO GROWTH
Lab: NORMAL
SPECIMEN SOURCE: NORMAL

## 2019-03-17 ENCOUNTER — NURSE TRIAGE (OUTPATIENT)
Dept: NURSING | Facility: CLINIC | Age: 59
End: 2019-03-17

## 2019-03-17 NOTE — TELEPHONE ENCOUNTER
.Caller is inquiring what  medication his doctor told him to stop at his last OV   Review of EMR reveals:         Nonspecific finding on examination of urine     He has been on Cipro for UA that indicate infection.  He has not been having clear symptoms of UTI.  His urine today is much improved, so we will stop the Cipro since we are going to be starting on the prednisone due to potential interactions with these medications.  We will follow-up on culture results to see if we need to start a second antibiotic if this is still positive    Caller understands and will follow up with clinic tomorrow    Nadia Florence RN  FNA    Additional Information    Health Information question, no triage required and triager able to answer question    Protocols used: INFORMATION ONLY CALL-ADULT-

## 2019-03-18 ENCOUNTER — PATIENT OUTREACH (OUTPATIENT)
Dept: CARE COORDINATION | Facility: CLINIC | Age: 59
End: 2019-03-18

## 2019-03-18 ENCOUNTER — TELEPHONE (OUTPATIENT)
Dept: FAMILY MEDICINE | Facility: CLINIC | Age: 59
End: 2019-03-18

## 2019-03-18 DIAGNOSIS — R53.1 GENERALIZED WEAKNESS: Primary | ICD-10-CM

## 2019-03-18 DIAGNOSIS — M47.818 OSTEOARTHRITIS OF SPINE WITHOUT MYELOPATHY OR RADICULOPATHY, SACRAL AND SACROCOCCYGEAL REGION: ICD-10-CM

## 2019-03-18 RX ORDER — TRAZODONE HYDROCHLORIDE 50 MG/1
50 TABLET, FILM COATED ORAL AT BEDTIME
Qty: 90 TABLET | Refills: 3 | Status: CANCELLED | OUTPATIENT
Start: 2019-03-18

## 2019-03-18 ASSESSMENT — ACTIVITIES OF DAILY LIVING (ADL): DEPENDENT_IADLS:: TRANSPORTATION

## 2019-03-18 NOTE — TELEPHONE ENCOUNTER
Patient is contacted.  He is very frustrated with the process.  He has been calling what he thought was his clinic coordinator but is probably his U care worker instead as the clinic coordinator has no messages from this patient. We have discussed his antibiotic.  He no longer needs to take his Cipro as the infection is gone.  Dr. Cathy Quiñones prescribed his Keppra.  I have asked him what he has tried for his constipation.?  Everything OTC is the reply.  To try Miralax.  And keep his appt with us on Friday. Catherine WANG RN

## 2019-03-18 NOTE — TELEPHONE ENCOUNTER
Patient is calling back asking for a wheelchair, a toilet riser, and a pull up bar for his bed.  He states the number to order these supplies is [phone].  [facility] and their fax is [phone].

## 2019-03-18 NOTE — TELEPHONE ENCOUNTER
That's too bad, I do think he could benefit from a referral.      Thanks for the update,  Chidi Valenzuela MD

## 2019-03-18 NOTE — TELEPHONE ENCOUNTER
[facility] spoke with pt today.  He had [facility] staff open him to services.      Pt explained that he needs prescriptions for a wheelchair, toilet raiser, and pull up bar for his bed.  SW reviewed pt's EMR.  Wheelchair order was placed on 3-15-19, toilet raiser was placed on 12-17-18, a shower chair on 12-17-18, but no bed pull up bar is ordered yet.    Will you please address the request of DME and order a bed pull up bar?  Then, please print the above orders and fax them to:  [facility].  Fax  Number is: [phone].    [facility] left a message for pt's [facility] RN, Michelle STARR, [phone], requesting a return call.    Leonor Ortiz, Clinic Care Coordination-Social Work  [phone]

## 2019-03-18 NOTE — TELEPHONE ENCOUNTER
[facility] now requests orders and shares plan of care/discharge summaries for some patients through [employer].  Please REPLY TO THIS MESSAGE OR ROUTE BACK TO THE AUTHOR in order to give authorization for orders when needed.  This is considered a verbal order, you will still receive a faxed copy of orders for signature.  Thank you for your assistance in improving collaboration for our patients.    ORDER    MD SUMMARY/PLAN OF CARE    PT 2w4 for ambulation, transfers, exercises, pain management.  SN eval and treat for medication management, OT eval and treat for ADLs, shower assessment, cognitive testing.  MSW for resources and advance care planning.    Michelle Herrmann MPT.

## 2019-03-18 NOTE — PROGRESS NOTES
"Clinic Care Coordination Contact  Care Team Conversations      [facility] spoke with pt today.  [facility] staff opened him to RN, PT/OT, HHA and SW services.      Pt shared he has not heard back from Eleanor, his [facility]  yet, and provided this writer with her phone number at [phone].     Pt explained that he needs prescriptions for a wheelchair, toilet raiser, and pull up bar for his bed.  SW reviewed pt's EMR.  Wheelchair order was placed on 3-15-19, toilet raiser was placed on 12-17-18, a shower chair on 12-17-18, but no bed pull up bar is ordered yet.      sent notice to PCP and Clinic RN team to please address the request of DME and order a bed pull up bar and then print the above orders and fax them to:  [facility].  Fax number is: [phone].     [facility] left a message for pt's  Home Care PT, Michelle STARR, [phone], requesting a return call.  Michelle returned this writer's call and shared that she was out to the pt's home today.  He lives in a \"mother in law unit\" in the garage with no running water.  He has heat, a galley kitchen and a commode.  His brother is staying with him for 1 more week.  Michelle said that if the pt's brother left to return to the [facility], the pt is in an unsafe environment without a wheelchair to navigate his place.    SW to continue to assist the pt, work with his care team for resources & support, and will contact the pt in 2-4 weeks, or as needed.    Leonor Ortiz  Social Work Care Coordinator  [facility]  [phone]      "

## 2019-03-18 NOTE — TELEPHONE ENCOUNTER
DME order for the bed pull-up bar is placed.  Could we please fax all the orders as specified in the message below?    Agree with trial of Miralax for constipation.    Will discuss trazodone refill at upcoming appointment.      Thanks,  Chidi Valenzuela MD

## 2019-03-18 NOTE — TELEPHONE ENCOUNTER
MTM referral from: [facility] visit (referral by provider)    MTM referral outreach attempt #2 on March 18, 2019 at 4:29 PM      Outcome: Patient is not interested at this time because he does not feel that he needs MTM, will route to MTM Pharmacist/Provider as an FYI. Thank you for the referral.     Norma Cardenas, MTM Coordinator

## 2019-03-18 NOTE — TELEPHONE ENCOUNTER
Reason for Call:  Other call back    Detailed comments: Pt calling with multiple concerns.  1. He thinks he was supposed to stop two medications but he only stopped Penicillin, wondering if he was supposed to stop Keppra?  2.  Would like something for going to the bathroom, has not had a BM in 4-5 days  3. Would like refill of a med he has had in the past Constulose but is wondering if it comes in another flavor Pt stating he wants this and Trazodone sent to mail order but I do not see one on file.   4. Refill  Trazodone      Last Written Prescription Date:  3/30/2018  Last Fill Quantity: 90,   # refills: 3  Last Office Visit: 3/15/2019  Future Office visit:    Next 5 appointments (look out 90 days)    Mar 22, 2019  1:00 PM CDT  SHORT with Chidi Valenzuela MD  [facility] [address] [address] [phone]           Phone Number Patient can be reached at: Home number on file [phone] (home)    Best Time: any    Can we leave a detailed message on this number? YES    Call taken on 3/18/2019 at 11:59 AM by Deneen Brewer

## 2019-03-19 ENCOUNTER — TELEPHONE (OUTPATIENT)
Dept: FAMILY MEDICINE | Facility: CLINIC | Age: 59
End: 2019-03-19

## 2019-03-19 LAB — VIT B1 BLD-MCNC: 43 NMOL/L (ref 70–180)

## 2019-03-19 RX ORDER — LANOLIN ALCOHOL/MO/W.PET/CERES
100 CREAM (GRAM) TOPICAL DAILY
Qty: 90 TABLET | Refills: 3 | Status: SHIPPED | OUTPATIENT
Start: 2019-03-19 | End: 2019-05-02

## 2019-03-19 NOTE — TELEPHONE ENCOUNTER
"S:  Patient's insurance needs DME order for wheelchair to specify \"manual\" wheel chair.    B:  Original DME order for wheel chair sent on 3/15/19  \"1. Generalized muscle weakness     Abhijeet presents with ongoing worsening generalized weakness.  He has had prior workup in the emergency room as noted above.  We did further workup as noted below.  His inflammatory markers are rather elevated, and I wonder if his diffuse pains may be related to PMR.  I am not sure this entirely explains his weakness.  Vitamin B1 level is still in process.  TSH and B12 levels were normal a few months ago, so these were not rechecked.  He requested a wheelchair to bring home, and social work assisted with getting him want to rent.  Home care referral placed due to his reports of severe weakness inhibiting his ability to do care for self at home.     ADDENDUM:  Labs do show low thiamine.  Thiamine deficiency may be causing neuropathy.  Will start supplementation and recheck level in a couple of months.       - Erythrocyte sedimentation rate auto  - CRP inflammation  - *UA reflex to Microscopic and Culture ([facility] (except [place] and [place])  - Vitamin B1 whole blood  - CK total  - XR Chest 2 Views  - Alcohol ethyl  - CBC with platelets  - Comprehensive metabolic panel  - CARE COORDINATION REFERRAL  - Urine Microscopic  - order for DME; Equipment being ordered: Wheelchair  Dispense: 1 each; Refill: 0  - HOME CARE NURSING REFERRAL\"    A:  Patient does not currently have a wheelchair and will need this new DME sent in place of the previous one.    R:  Routed to provider:  Please review and sign DME order for new wheelchair if appropriate.    Vincent Dockery RN    "

## 2019-03-19 NOTE — TELEPHONE ENCOUNTER
April from [employer] is calling asking for the A new wheel chair DME order there needs to be stated on the order that it is a  Manual wheelchair and it needs a length of time needed either months or lifetime. They are asking to fax the new one back to them [phone].    Karen Menjivar on 3/19/2019 at 2:43 PM

## 2019-03-19 NOTE — TELEPHONE ENCOUNTER
Order printed and placed in my outbasket.  Estimated duration of 3 months.    Thanks,  Chidi Valenzuela MD

## 2019-03-19 NOTE — TELEPHONE ENCOUNTER
I faxed orders for bed pull up bar, wheelchair, shower chair, and toilet seat riser to [phone].

## 2019-03-20 ENCOUNTER — PATIENT OUTREACH (OUTPATIENT)
Dept: CARE COORDINATION | Facility: CLINIC | Age: 59
End: 2019-03-20

## 2019-03-20 ASSESSMENT — ACTIVITIES OF DAILY LIVING (ADL): DEPENDENT_IADLS:: TRANSPORTATION;CLEANING;COOKING;LAUNDRY;SHOPPING;MEAL PREPARATION

## 2019-03-20 NOTE — TELEPHONE ENCOUNTER
[facility] now requests orders and shares plan of care/discharge summaries for some patients through [employer].  Please REPLY TO THIS MESSAGE OR ROUTE BACK TO THE AUTHOR in order to give authorization for orders when needed.  This is considered a verbal order, you will still receive a faxed copy of orders for signature.  Thank you for your assistance in improving collaboration for our patients.    ORDER    Home Occupational Therapy for cognition, ADLs, ADL transfers, equipment, home safety and falls prevention.     1w1,2w2

## 2019-03-20 NOTE — LETTER
March 29, 2018      Abhijeet cMcauley  [address] [place] [id number]        Dear ,    We are writing to inform you of your test results.    Creatinine is stable. The Hgb is rising. You should follow the instructions from the clinic.  You should recheck the Hgb in about one month, before 4-28-18        Resulted Orders   Creatinine   Result Value Ref Range    Creatinine 2.02 (H) 0.66 - 1.25 mg/dL    GFR Estimate 34 (L) >60 mL/min/1.7m2      Comment:      Non  GFR Calc    GFR Estimate If Black 41 (L) >60 mL/min/1.7m2      Comment:      African American GFR Calc   Hemoglobin   Result Value Ref Range    Hemoglobin 8.5 (L) 13.3 - 17.7 g/dL       If you have any questions or concerns, please call the clinic at the number listed above.       Sincerely,        Parrish Funk MD                 No

## 2019-03-20 NOTE — PROGRESS NOTES
Clinic Care Coordination Contact  Care Team Conversations    SW received phone call from [facility].  [phone] asking when pt's DME was faxed to [facility].  NIKO confirmed via pt's EMR that DME was re-faxed with requested information just this morning.    Michelle shared that she is at the pt's home now & that he cannot get out of bed, even with assistance of 2 people, Michelle and his brother.      Michelle shared that the pt admits to not taking his Lactulose, stomach area is distended from her last visit 2 days ago, and that the pt is confused and not making appropriate decisions.  OT saw the pt yesterday and scored 1/5 on the mini cog test, and explained to this writer that further exploration of why the pt is confused is warranted.    NIKO conferred with PCP who informed that an ED evaluation is appropriate for this pt.  NIKO shared this information with JAKE [facility], who will call 911.  NIKO also requested that Michelle make an APS report based on pt's decision making of not taking needed medications, which have now caused him impairment.  She verbally agreed that she will make the APS report.    Pt spoke to this writer via Michelle's phone, stating he is very concerned about not making it to his Audiology appointment tomorrow to get his new hearing aides, stating he cannot hear without them.  NIKO notified pt that he is speaking/hearing this writer just fine today & that SW to inform clinic staff that pt MAY not be attending his appt.  Pt agrees and allowed the 911 call to be made.  [facility]    NIKO to provide warm hand off to receiving ED, if able to determine where the pt is going.    Leonor Ortiz  Social Work Care Coordinator  [facility]  [phone]

## 2019-03-21 ASSESSMENT — ACTIVITIES OF DAILY LIVING (ADL): DEPENDENT_IADLS:: TRANSPORTATION;CLEANING;COOKING;LAUNDRY;SHOPPING;MEAL PREPARATION

## 2019-03-21 NOTE — PROGRESS NOTES
Clinic Care Coordination Contact  Care Coordination Transition Communication    Referral Source: PCP    Clinical Data: Patient was hospitalized at [facility] from 3-20-19 to present with diagnosis of bilateral leg weakness & TCU placement.     Transition to Facility:              Facility Name: TBD              Contact name and phone number/fax: TBD    Plan: RN/SW Care Coordinator will await notification from facility staff informing RN/SW Care Coordinator of patient's discharge plans/needs. RN/SW Care Coordinator will review chart and outreach to facility staff every 4 weeks and as needed.     NIKO called pt and discovered that he is at [facility] as [facility] was on diversion yesterday.  Pt gave VERBAL PERMISSION for this writer to update CareEverywhere so that I could read his medical records & be able to speak with his unit's .    NIKO placed call to unit, [facility] , Erica Benitez and provided a warm hand in.  Erica informed this writer that she will update the medical team & work on getting the pt placed in a TCU for cares.    Updated PCP by routing this note.    Leonor Ortiz  Social Work Care Coordinator  [facility]  [phone]

## 2019-03-25 ENCOUNTER — PATIENT OUTREACH (OUTPATIENT)
Dept: CARE COORDINATION | Facility: CLINIC | Age: 59
End: 2019-03-25

## 2019-03-25 ASSESSMENT — ACTIVITIES OF DAILY LIVING (ADL): DEPENDENT_IADLS:: TRANSPORTATION;CLEANING;COOKING;LAUNDRY;SHOPPING;MEAL PREPARATION

## 2019-03-25 NOTE — LETTER
Hand-off  for Care Coordination  What is Care Coordination?  [facility] Care Coordination Services are available to people in complex situations, for example medical, social or financial. The Care Coordinator, a SW or an RN, works with the   patient and their doctor to determine health goals, obtain resources, achieve outcomes,   and develop plans to coordinate care across settings.    Patient Name:   Abhijeet Mccauley  Patient :     1960    Patient PCP:     Chidi Valenzuela MD    Patient Primary Clinic:   [address] [place] [id number]    D/C Facility: ________________________________________________________  TCU Contact Info for questions: _______________________________________    D/C Date:  __________________________________________________________    Follow-up Apt with PCP after TCU D/C:   _________________________________    Other Follow-up Apt s:      _____________________________________________  Additional information (concerns, and Home Care, ect ): ____________________________________________________________________________________________________________________________________________    Please return this to pt's Clinic Care Coordinator upon TCU discharge:  Leonor Ortiz  Social Work Care Coordinator  [facility]  Phone:  [phone]  Fax:  [phone]    Thank you!

## 2019-03-25 NOTE — PROGRESS NOTES
Clinic Care Coordination Contact  Care Coordination Transition Communication    Referral Source: PCP    Clinical Data: Patient was hospitalized at [facility] from 3-20-19 to present with diagnosis of leg weakness.  Pmhx of liver cirrhosis, AML in remission, anemia, DVT/PE, HTN, Stage 3 CKD, and possible polymyalgia rheumatica.      Per Medical records, anticipated date of discharge 3/26 or 3/27 once completed IVIG and pending TCU placement.    Transition to Facility:              Facility Name: TBD              Contact name and phone number/fax: TBD    Plan: RN/SW Care Coordinator will await notification from facility staff informing RN/SW Care Coordinator of patient's discharge plans/needs. RN/SW Care Coordinator will review chart and outreach to facility staff every 4 weeks and as needed.     Leonor Ortiz  Social Work Care Coordinator  [facility]  [phone]

## 2019-04-01 ASSESSMENT — ACTIVITIES OF DAILY LIVING (ADL): DEPENDENT_IADLS:: TRANSPORTATION;CLEANING;COOKING;LAUNDRY;SHOPPING;MEAL PREPARATION

## 2019-04-01 NOTE — PROGRESS NOTES
Clinic Care Coordination Contact  Care Coordination Transition Communication    [facility] placed call to patient for follow up care coordination cares.  SW introduced self, title and role.  Pt agrees to speak with writer today and pt shared that he discharged to a TCU for cares & expects to be there about 1 month.    Referral Source: PCP    Clinical Data: Patient was hospitalized at [facility] from 3-20-19 to 3-27-19 with diagnosis of weakness.     Transition to Facility:  Facility Name: [facility]  Contact name and phone number/fax:   [address] [place] [address]  Phone: [phone]      Plan:   Care Coordinator will await notification from facility staff informing  Care Coordinator of patient's discharge plans/needs.  Care Coordinator will review chart and outreach to facility staff every 4 weeks and as needed.     Leonor Ortiz  Social Work Care Coordinator  [facility]  [phone]

## 2019-04-08 ENCOUNTER — TELEPHONE (OUTPATIENT)
Dept: FAMILY MEDICINE | Facility: CLINIC | Age: 59
End: 2019-04-08

## 2019-04-09 ENCOUNTER — OFFICE VISIT (OUTPATIENT)
Dept: AUDIOLOGY | Facility: CLINIC | Age: 59
End: 2019-04-09
Payer: COMMERCIAL

## 2019-04-09 DIAGNOSIS — H90.3 SENSORINEURAL HEARING LOSS, ASYMMETRICAL: Primary | ICD-10-CM

## 2019-04-09 PROCEDURE — V5011 HEARING AID FITTING/CHECKING: HCPCS | Mod: LT | Performed by: AUDIOLOGIST

## 2019-04-09 PROCEDURE — 99207 ZZC NO CHARGE LOS: CPT | Performed by: AUDIOLOGIST

## 2019-04-09 PROCEDURE — V5011 HEARING AID FITTING/CHECKING: HCPCS | Mod: RT | Performed by: AUDIOLOGIST

## 2019-04-09 PROCEDURE — V5160 DISPENSING FEE BINAURAL: HCPCS | Performed by: AUDIOLOGIST

## 2019-04-09 PROCEDURE — V5264 EAR MOLD/INSERT: HCPCS | Mod: RT | Performed by: AUDIOLOGIST

## 2019-04-09 PROCEDURE — 92593 HC HEARING AID CHECK, BINAURAL: CPT | Performed by: AUDIOLOGIST

## 2019-04-09 PROCEDURE — V5261 HEARING AID, DIGIT, BIN, BTE: HCPCS | Mod: NU | Performed by: AUDIOLOGIST

## 2019-04-09 PROCEDURE — V5020 CONFORMITY EVALUATION: HCPCS | Mod: RT | Performed by: AUDIOLOGIST

## 2019-04-09 PROCEDURE — V5020 CONFORMITY EVALUATION: HCPCS | Mod: LT | Performed by: AUDIOLOGIST

## 2019-04-09 NOTE — TELEPHONE ENCOUNTER
[facility] Biometric form completed and routed to DR. Rivero for review and signature.

## 2019-04-09 NOTE — PROGRESS NOTES
AUDIOLOGY REPORT    SUBJECTIVE: Abhijeet Mccauley, a 58 year old male, was seen in the Audiology Clinic at [facility] today for a Binaural hearing aid fitting. Previous results have revealed a bilateral asymmetrical sensorineural hearing loss. The patient was given medical clearance to pursue amplification by  Lennox Polanco MD..      OBJECTIVE:  Prior to fitting, a hearing aid check was performed to ensure device functionality. The hearing aid conformity evaluation was completed.The hearing aids were placed and they provided a good fit. Real-ear-probe-microphone measurements were completed on the Rangespan system and were a good match to NAL-NL2 target with soft sounds audible, moderate sounds comfortable, and loud sounds below discomfort. UCLs are verified through maximum power output measures and demonstrate appropriate limiting of loud inputs. Mr. Mccauley was oriented to proper hearing aid use, care, cleaning (no water, dry brush), batteries (rechargable, low-battery signal), aid insertion/removal, user booklet, warranty information, storage cases, and other hearing aid details. The patient confirmed understanding of hearing aid use and care, and showed proper insertion of hearing aid and batteries while in the office today. Mr. Mccauley reported good volume and sound quality today.    EAR(S) FIT:    MA HEARING AID MAKE: Right: Phonak Bolero B50-CA; Left: Phoank Bolero B50-CA  MA HEARING AID MODEL #: Right: [id number]; Left: [id number]  HEARING AID STYLE: Right: BTE; Left: BTE  DOME SIZE: Right:   ; Left::      LENGTH: Right:   ; Left:     EARMOLDS: Right: skeleton, acrylic; Left:  skeleton, acrylic  SERIAL NUMBERS: Right: [id number]; Left: [id number]  WARRANTY END DATE: Right: 5/26/2022; Left:: 5/26/2022      CHARGES:   Earmold(s): , Qty 2, $160.00, NU (New)  Hearing Aid Check: Binaural, 34329, $81.00  Dispensing Fee: Binaural, , $500.00, , RT,LT  Fit/Orientation:  Binaural, , $322.00  Hearing Aid Conformity Evaluation: , Qty:2RT, LT  Hearing Aid Digital: Binaural, BTE, .,NU (Binaural)       ASSESSMENT: Binaural hearing aid fitting completed today. Verification measures were performed. The 45 day trial period was explained to patient, and they expressed understanding. Mr. Mccauley signed the Hearing Aid Purchase Agreement and was given a copy, as well as details on his hearing aids. Patient was counseled that exact out of pocket amounts cannot be determined for hearing aid claims being sent to insurance. Any insurance coverage information presented to the patient is an estimate only, and is not a guarantee of payment. Patient has been advised to check with their own insurance.    PLAN: Mr. Mccauley will return for follow-up in 2-3 weeks for a hearing aid review appointment. Please call this clinic with questions regarding today s appointment.    Ai KUNZ, #[id number]

## 2019-04-10 DIAGNOSIS — Z53.9 DIAGNOSIS NOT YET DEFINED: Primary | ICD-10-CM

## 2019-04-10 PROCEDURE — G0180 MD CERTIFICATION HHA PATIENT: HCPCS | Performed by: INTERNAL MEDICINE

## 2019-04-10 NOTE — PROGRESS NOTES
Discharge Note -Physical Therapy    NAME:  Abhijeet Mccauley  MRN:   [id number]    S:    Pt did not follow up for therapy as recommended.    O:  Objective information is not available as pt has not returned for therapy.  Initial evaluation note will serve as final entry.    A:   Pt did not return for further treatment.    Status of goals is unknown due to lack of followup by patient.    P:  Discharge from PT this date.    Thank you for this referral,    Bhakti Nava, PT,  CEAS   #[id number]  [facility]  [phone]

## 2019-04-10 NOTE — ADDENDUM NOTE
Encounter addended by: Bhakti Nava, PT on: 4/10/2019 11:35 AM   Actions taken: Sign clinical note, Flowsheet accepted, Episode resolved

## 2019-04-26 ENCOUNTER — TELEPHONE (OUTPATIENT)
Dept: FAMILY MEDICINE | Facility: CLINIC | Age: 59
End: 2019-04-26

## 2019-04-26 DIAGNOSIS — M47.818 OSTEOARTHRITIS OF SPINE WITHOUT MYELOPATHY OR RADICULOPATHY, SACRAL AND SACROCOCCYGEAL REGION: ICD-10-CM

## 2019-04-26 DIAGNOSIS — K21.9 GASTROESOPHAGEAL REFLUX DISEASE WITHOUT ESOPHAGITIS: ICD-10-CM

## 2019-04-26 RX ORDER — TRAZODONE HYDROCHLORIDE 50 MG/1
50 TABLET, FILM COATED ORAL AT BEDTIME
Qty: 90 TABLET | Refills: 3 | Status: CANCELLED | OUTPATIENT
Start: 2019-04-26

## 2019-04-26 RX ORDER — PROPRANOLOL HYDROCHLORIDE 20 MG/1
20 TABLET ORAL 2 TIMES DAILY
Qty: 180 TABLET | Refills: 1 | Status: CANCELLED | OUTPATIENT
Start: 2019-04-26

## 2019-04-26 NOTE — TELEPHONE ENCOUNTER
Patient reports he was discharged from [facility] rehab unit yesterday and wanted to review his medications.  He states they were put in a bag and just given to him, no one reviewed them with him.  Patient was able to go through each med (listed below) in his bag and spell/read each one to RN, dose verified and when to take was verified.  Patient reports he feels organized and confident that he knows what meds he is taking.  He did give RN permission to view his [facility] records from 3-27-19 via care everywhere.  He is in need of two medications, propranolol and trazodone as he will run out before schedule f/u with you 5-2-19 - these meds are ready and pharm.  He will bring his discharge papers and his current meds to the appt 5-2-19.  He was not very clear what brought him to the ER and/or why he was [facility] other than he was numb everywhere (Hard to read the [facility] notes - did not see specific discharge instructions nor are there any [facility] discharge summaries available via care everywhere).  Do you have any concerns?    Keppra 250mg BID   Propranolol 20mg BID  Trazodone 50mg nightly at bedtime  Furosemide 20mg every morning  xifaxan 550mg BID (not on current med list) - he is not clear as to why he is taking this.  Pantoprazole 40mg daily  Potassium chloride 10 meq daily  Gabapentin 300mg directions are BID (not on current med list).  He states he uses this for sleep.    Routing to provider.  Fatou ESTRADA RN

## 2019-04-26 NOTE — TELEPHONE ENCOUNTER
Reason for Call:  Other prescription    Detailed comments: Patient got discharged from [facility] 4/25/19 and he has not taken any of his medications. He does not know what and when to take his medications. He is wanting someone to come out and help him with them each week.     Phone Number Patient can be reached at: Home number on file [phone] (home)    Best Time: any    Can we leave a detailed message on this number? YES    Call taken on 4/26/2019 at 9:30 AM by Karen Menjivar

## 2019-04-26 NOTE — TELEPHONE ENCOUNTER
I am also not able to view helpful notes on Care Everywhere- I'm not sure these are updating since he is not in clinic.  It appears he should already have a refill of the propranolol at the pharmacy, and I would prefer to discuss the trazodone refill at his clinic appointment after I've had the ability to review his outside records.    Chidi Valenzuela MD

## 2019-04-29 ENCOUNTER — TELEPHONE (OUTPATIENT)
Dept: FAMILY MEDICINE | Facility: CLINIC | Age: 59
End: 2019-04-29

## 2019-04-29 ENCOUNTER — NURSE TRIAGE (OUTPATIENT)
Dept: NURSING | Facility: CLINIC | Age: 59
End: 2019-04-29

## 2019-04-29 NOTE — TELEPHONE ENCOUNTER
Aye DIA from [facility] received initiation orders from [facility] to initiate home care for patient.    Requesting continuing orders for skilled nursing twice a week for one week, twice a week for two weeks, (twice a week for three weeks) PT eval and OT eval. for one week.    You may reach Aye DIA at [phone], with a secure VM.    Routed to [facility] Provider Care Team FP/IM    Pili Osorio RN  [facility]

## 2019-04-29 NOTE — TELEPHONE ENCOUNTER
Aye DIA from [facility] received initiation orders from [facility] to initiate home care for patient.    Requesting continuing orders for skilled nursing twice a week for one week, twice a week for two weeks, (twice a week for three weeks) PT eval and OT eval. for one week.    Routed to [facility] Provider Care Team FP/IM    Pili Osorio RN  [facility]

## 2019-05-01 NOTE — PROGRESS NOTES
SUBJECTIVE:   Abhijeet Mccauley is a 58 year old male who presents to clinic today for the following health issues:  Chief Complaint   Patient presents with     Hospital F/U     Was in rehab for about a month      Medication Problem     Would like to discuss medication with you.      [facility]    Hospital Follow-up Visit:    Hospital/Nursing Home/IP Rehab Facility: [facility]   Date of Admission: around 03/20/19   Date of Discharge: 04/25/19  Reason(s) for Admission: Weakness from AIDP, rehab             Problems taking medications regularly:  None       Medication changes since discharge: None       Problems adhering to non-medication therapy:  None     Summary of hospitalization:  CareEverywhere information obtained and reviewed    Abhijeet was admitted with ongoing progressive weakness and was ultimately diagnosed with AIDP and treated with IVIG from 3/22 to 3/26.  He was started on rifaximin for cirrhosis and lactulose was discontinued, but it seems that at some point during the rehab stay this was resumed.      He does not recall much about the hospitalization.      I had seen him prior to hospitalization and in the addition to the weakness, he was reporteding head to toe pain with elevated inflammatory markers, so I was wondering about PMR (though this obviously would not explain the weakness), and I started prednisone, but he doesn't recall that this helped the pain at all.  He reports he continues to have a lot of pain, mostly in the legs (he is being consider for knee surgery but needs to abstain from alcohol for 6 months prior)    He reports he was started on gabapentin the second week in rehab to help with numbness in finger, this is helping a lot.    He was started on cholestyramine at some point, rehab records suggest this was started for itching.       He is getting set up with home care.      Diagnostic Tests/Treatments reviewed.  Follow up needed: none  Other  Healthcare Providers Involved in Patient s Care:         Homecare  Update since discharge: improved.     He is hoping to get a refill of trazodone- he tolerates this well without side effects    Post Discharge Medication Reconciliation: discharge medications reconciled and changed, per note/orders (see AVS).  Plan of care communicated with patient              Additional history: as documented    Reviewed and updated as needed this visit by clinical staff         Reviewed and updated as needed this visit by Provider           Patient Active Problem List   Diagnosis     Essential hypertension     Acute myeloid leukemia in remission (H)     Sensorineural hearing loss, asymmetrical     Alcoholic cirrhosis of liver (H)     Acute alcoholic hepatitis     Coagulation defect (H)     Osteoarthrosis     Thrombocytopenia (H)     Universal ulcerative (chronic) colitis(556.6) (H)     CKD (chronic kidney disease) stage 3, GFR 30-59 ml/min (H)     Rosacea     Mild intermittent asthma without complication     Peptic ulcer disease     Uncomplicated alcohol dependence (H)     Tobacco dependence syndrome     Tubular adenoma of colon     Normocytic anemia     Leukocytosis with increased monocytes     Generalized weakness     COPD exacerbation (H)     AIDP (acute inflammatory demyelinating polyneuropathy) (H)     Past Surgical History:   Procedure Laterality Date     AS TOTAL KNEE ARTHROPLASTY Left      BONE MARROW BIOPSY, BONE SPECIMEN, NEEDLE/TROCAR N/A 6/12/2018    Procedure: BIOPSY BONE MARROW;  Bone Marrow Biopsy;  Surgeon: Demar Sapp MD;  Location: [facility]     ESOPHAGOSCOPY, GASTROSCOPY, DUODENOSCOPY (EGD), COMBINED N/A 2/8/2018    Procedure: COMBINED ESOPHAGOSCOPY, GASTROSCOPY, DUODENOSCOPY (EGD), BIOPSY SINGLE OR MULTIPLE;  gastroscopy with biopsies;  Surgeon: Raz Cobb MD;  Location: [facility]     ESOPHAGOSCOPY, GASTROSCOPY, DUODENOSCOPY (EGD), COMBINED N/A 3/18/2018    Procedure: COMBINED ESOPHAGOSCOPY,  GASTROSCOPY, DUODENOSCOPY (EGD);;  Surgeon: Raz Cobb MD;  Location: [facility]     LACERATION REPAIR Right     Right leg       Social History     Tobacco Use     Smoking status: Current Every Day Smoker     Packs/day: 0.50     Years: 30.00     Pack years: 15.00     Types: Cigarettes     Smokeless tobacco: Never Used     Tobacco comment: I strongly emphasized the importance of quitting smoking. 4-5 daily   Substance Use Topics     Alcohol use: Yes     Comment: Down to 3 beers per day.  Sometimes a cocktail also.     Family History   Problem Relation Age of Onset     Hypertension Mother      Coronary Artery Disease Father         MI         Current Outpatient Medications   Medication Sig Dispense Refill     albuterol (VENTOLIN HFA) 108 (90 Base) MCG/ACT inhaler Inhale 2 puffs into the lungs every 4 hours as needed for shortness of breath / dyspnea or wheezing 18 g 11     baclofen (LIORESAL) 10 MG tablet Take 0.5-1 tablets (5-10 mg) by mouth 3 times daily Take 1/2 to one tablet up to three times a day 90 tablet 3     cholestyramine (QUESTRAN) 4 g packet Take 1 packet (4 g) by mouth 2 times daily (with meals) 60 each 11     furosemide (LASIX) 20 MG tablet Take 1 tablet (20 mg) by mouth every morning 90 tablet 3     gabapentin (NEURONTIN) 300 MG capsule Take 1 capsule (300 mg) by mouth 3 times daily 90 capsule 11     loratadine (CLARITIN) 10 MG tablet Take 1 tablet (10 mg) by mouth daily as needed for allergies 30 tablet 6     pantoprazole (PROTONIX) 40 MG EC tablet TAKE ONE TABLET BY MOUTH EVERY MORNING BEFORE BREAKFAST 30 tablet 11     potassium chloride ER (K-DUR/KLOR-CON M) 10 MEQ CR tablet Take 1 tablet (10 mEq) by mouth daily 90 tablet 3     propranolol (INDERAL) 20 MG tablet Take 1 tablet (20 mg) by mouth 2 times daily 180 tablet 3     rifaximin (XIFAXAN) 550 MG TABS tablet Take 1 tablet (550 mg) by mouth 2 times daily 180 tablet 3     traZODone (DESYREL) 50 MG tablet Take 1 tablet (50 mg) by mouth At Bedtime 90  "tablet 3     vitamin B1 (THIAMINE) 100 MG tablet Take 1 tablet (100 mg) by mouth daily 90 tablet 3     desonide (DESOWEN) 0.05 % external lotion Apply topically as needed (Patient not taking: Reported on 5/2/2019) 118 mL 1     mometasone-formoterol (DULERA) 200-5 MCG/ACT oral inhaler Inhale 2 puffs into the lungs 2 times daily        order for DME Equipment being ordered: Manual wheelchair.  Estimated duration- 3 months 1 Units 0     order for DME Equipment being ordered: bed pull up bar 1 Units 0     order for DME Equipment being ordered: Wheelchair 1 each 0     order for DME Equipment being ordered: shower chair 1 Device 0     order for DME Equipment being ordered: Toilet seat riser 1 Device 0     SODIUM BICARBONATE PO Take 650 mg by mouth daily as needed        tiotropium (SPIRIVA) 18 MCG capsule Inhale 1 capsule into the lungs daily Using PRN       Allergies   Allergen Reactions     Blood Transfusion Related (Informational Only)      Patient has a history of a clinically significant antibody against RBC antigens.  A delay in compatible RBCs may occur.     Famotidine Unknown and Other (See Comments)     Severe abdominal cramps     Cyclobenzaprine Hives     Methocarbamol Other (See Comments)     Made pt's \"face break out\"     Pepcid Cramps     Severe abdominal cramps     Vancomycin Other (See Comments)     hallucinations     Vfend Other (See Comments)     IV - cold sweats, Iron tablet makes him nauseated and stomach ached     Hydrocodone-Acetaminophen Rash       ROS:  Constitutional, GI, MSK/neuro systems are negative, except as otherwise noted.    OBJECTIVE:     /68   Pulse 75   Temp 97.6  F (36.4  C) (Tympanic)   Resp 16   Ht 1.676 m (5' 6\")   Wt 81.2 kg (179 lb)   SpO2 98%   BMI 28.89 kg/m    Body mass index is 28.89 kg/m .  GENERAL: healthy, alert and no distress  MS: ongoing generalized weakness but much improved from our last visit, able to ambulate now    Diagnostic Test Results:  none "     ASSESSMENT/PLAN:       1. AIDP (acute inflammatory demyelinating polyneuropathy) (H)    Improving, continue rehab      2. Osteoarthritis of spine without myelopathy or radiculopathy, sacral and sacrococcygeal region    Gabapentin is helping pain.  We will stop the Keppra and titrate gabapentin up from BID to TID.     - gabapentin (NEURONTIN) 300 MG capsule; Take 1 capsule (300 mg) by mouth 3 times daily  Dispense: 90 capsule; Refill: 11    3. Alcoholic cirrhosis of liver with ascites (H)    He really doesn't like the lactulose and was started on rifaximin.  Has been taking both in rehab.  Will stop the lactulose for now and recheck mental status in one month.  Sounds like rehab may have been monitoring ammonia level, could consider recheck at follow-up, though this lab's role in monitoring is not entirely clear.     - furosemide (LASIX) 20 MG tablet; Take 1 tablet (20 mg) by mouth every morning  Dispense: 90 tablet; Refill: 3  - propranolol (INDERAL) 20 MG tablet; Take 1 tablet (20 mg) by mouth 2 times daily  Dispense: 180 tablet; Refill: 3  - rifaximin (XIFAXAN) 550 MG TABS tablet; Take 1 tablet (550 mg) by mouth 2 times daily  Dispense: 180 tablet; Refill: 3    4. Gastroesophageal reflux disease without esophagitis    Continue PPI, refills at pharmacy    5. Thiamine deficiency neuropathy    Refills provided    - vitamin B1 (THIAMINE) 100 MG tablet; Take 1 tablet (100 mg) by mouth daily  Dispense: 90 tablet; Refill: 3    6. Muscle spasm    Refills provided, med is helping.     - baclofen (LIORESAL) 10 MG tablet; Take 0.5-1 tablets (5-10 mg) by mouth 3 times daily Take 1/2 to one tablet up to three times a day  Dispense: 90 tablet; Refill: 3    7. Seasonal allergic rhinitis due to pollen    Resume antihistamine as he is currently having symptoms.     - loratadine (CLARITIN) 10 MG tablet; Take 1 tablet (10 mg) by mouth daily as needed for allergies  Dispense: 30 tablet; Refill: 6    8. Mild intermittent asthma  without complication    Refills provided    - albuterol (VENTOLIN HFA) 108 (90 Base) MCG/ACT inhaler; Inhale 2 puffs into the lungs every 4 hours as needed for shortness of breath / dyspnea or wheezing  Dispense: 18 g; Refill: 11    9. Primary insomnia    Refills provided, med is helping    - traZODone (DESYREL) 50 MG tablet; Take 1 tablet (50 mg) by mouth At Bedtime  Dispense: 90 tablet; Refill: 3    10. Hypokalemia    Refills provided    - potassium chloride ER (K-DUR/KLOR-CON M) 10 MEQ CR tablet; Take 1 tablet (10 mEq) by mouth daily  Dispense: 90 tablet; Refill: 3    11. Itching    He doesn't like taking this and plans to switch to more of a prn usage    - cholestyramine (QUESTRAN) 4 g packet; Take 1 packet (4 g) by mouth 2 times daily (with meals)  Dispense: 60 each; Refill: 11    Follow-up 1 month    Chidi Valenzuela MD  [facility]

## 2019-05-02 ENCOUNTER — OFFICE VISIT (OUTPATIENT)
Dept: FAMILY MEDICINE | Facility: CLINIC | Age: 59
End: 2019-05-02
Payer: COMMERCIAL

## 2019-05-02 VITALS
SYSTOLIC BLOOD PRESSURE: 132 MMHG | DIASTOLIC BLOOD PRESSURE: 68 MMHG | RESPIRATION RATE: 16 BRPM | OXYGEN SATURATION: 98 % | HEIGHT: 66 IN | WEIGHT: 179 LBS | HEART RATE: 75 BPM | TEMPERATURE: 97.6 F | BODY MASS INDEX: 28.77 KG/M2

## 2019-05-02 DIAGNOSIS — L29.9 ITCHING: ICD-10-CM

## 2019-05-02 DIAGNOSIS — M47.818 OSTEOARTHRITIS OF SPINE WITHOUT MYELOPATHY OR RADICULOPATHY, SACRAL AND SACROCOCCYGEAL REGION: ICD-10-CM

## 2019-05-02 DIAGNOSIS — G61.0 AIDP (ACUTE INFLAMMATORY DEMYELINATING POLYNEUROPATHY) (H): Primary | ICD-10-CM

## 2019-05-02 DIAGNOSIS — J30.1 SEASONAL ALLERGIC RHINITIS DUE TO POLLEN: ICD-10-CM

## 2019-05-02 DIAGNOSIS — K21.9 GASTROESOPHAGEAL REFLUX DISEASE WITHOUT ESOPHAGITIS: ICD-10-CM

## 2019-05-02 DIAGNOSIS — F51.01 PRIMARY INSOMNIA: ICD-10-CM

## 2019-05-02 DIAGNOSIS — E87.6 HYPOKALEMIA: ICD-10-CM

## 2019-05-02 DIAGNOSIS — K70.31 ALCOHOLIC CIRRHOSIS OF LIVER WITH ASCITES (H): ICD-10-CM

## 2019-05-02 DIAGNOSIS — E51.11 THIAMINE DEFICIENCY NEUROPATHY: ICD-10-CM

## 2019-05-02 DIAGNOSIS — M62.838 MUSCLE SPASM: ICD-10-CM

## 2019-05-02 DIAGNOSIS — J45.20 MILD INTERMITTENT ASTHMA WITHOUT COMPLICATION: ICD-10-CM

## 2019-05-02 PROCEDURE — 99214 OFFICE O/P EST MOD 30 MIN: CPT | Performed by: INTERNAL MEDICINE

## 2019-05-02 RX ORDER — POTASSIUM CHLORIDE 750 MG/1
10 TABLET, EXTENDED RELEASE ORAL DAILY
Qty: 90 TABLET | Refills: 3 | Status: SHIPPED | OUTPATIENT
Start: 2019-05-02 | End: 2019-05-22

## 2019-05-02 RX ORDER — CHOLESTYRAMINE 4 G/9G
POWDER, FOR SUSPENSION ORAL
COMMUNITY
Start: 2019-04-04 | End: 2019-05-02

## 2019-05-02 RX ORDER — TRAZODONE HYDROCHLORIDE 50 MG/1
50 TABLET, FILM COATED ORAL AT BEDTIME
Qty: 90 TABLET | Refills: 3 | Status: SHIPPED | OUTPATIENT
Start: 2019-05-02 | End: 2020-04-14

## 2019-05-02 RX ORDER — GABAPENTIN 300 MG/1
300 CAPSULE ORAL 3 TIMES DAILY
Qty: 90 CAPSULE | Refills: 11 | Status: SHIPPED | OUTPATIENT
Start: 2019-05-02 | End: 2019-05-22

## 2019-05-02 RX ORDER — PROPRANOLOL HYDROCHLORIDE 20 MG/1
20 TABLET ORAL 2 TIMES DAILY
Qty: 180 TABLET | Refills: 3 | Status: SHIPPED | OUTPATIENT
Start: 2019-05-02 | End: 2020-07-06

## 2019-05-02 RX ORDER — ALBUTEROL SULFATE 90 UG/1
2 AEROSOL, METERED RESPIRATORY (INHALATION) EVERY 4 HOURS PRN
Qty: 18 G | Refills: 11 | Status: SHIPPED | OUTPATIENT
Start: 2019-05-02 | End: 2020-05-12

## 2019-05-02 RX ORDER — CHOLESTYRAMINE 4 G/9G
1 POWDER, FOR SUSPENSION ORAL 2 TIMES DAILY WITH MEALS
Qty: 60 EACH | Refills: 11 | Status: SHIPPED | OUTPATIENT
Start: 2019-05-02 | End: 2020-02-27

## 2019-05-02 RX ORDER — BACLOFEN 10 MG/1
5-10 TABLET ORAL 3 TIMES DAILY
Qty: 90 TABLET | Refills: 3 | Status: SHIPPED | OUTPATIENT
Start: 2019-05-02 | End: 2019-08-31

## 2019-05-02 RX ORDER — GABAPENTIN 300 MG/1
CAPSULE ORAL
COMMUNITY
Start: 2019-04-18 | End: 2019-05-02

## 2019-05-02 RX ORDER — LANOLIN ALCOHOL/MO/W.PET/CERES
100 CREAM (GRAM) TOPICAL DAILY
Qty: 90 TABLET | Refills: 3 | Status: SHIPPED | OUTPATIENT
Start: 2019-05-02 | End: 2020-02-27

## 2019-05-02 RX ORDER — LORATADINE 10 MG/1
10 TABLET ORAL DAILY PRN
Qty: 30 TABLET | Refills: 6 | Status: SHIPPED | OUTPATIENT
Start: 2019-05-02 | End: 2019-09-30

## 2019-05-02 RX ORDER — FUROSEMIDE 20 MG
20 TABLET ORAL EVERY MORNING
Qty: 90 TABLET | Refills: 3 | Status: SHIPPED | OUTPATIENT
Start: 2019-05-02 | End: 2020-03-17

## 2019-05-02 ASSESSMENT — MIFFLIN-ST. JEOR: SCORE: 1564.69

## 2019-05-02 NOTE — PATIENT INSTRUCTIONS
Stop the lactulose for now- you are now on rifaximin instead.      The cholestyramine if for itching- you can try taking this as needed.     Stop the levetiracetam, we'll try increasing the gabapentin instead.  Take the gabapentin three times per day.

## 2019-05-03 PROBLEM — G61.0 AIDP (ACUTE INFLAMMATORY DEMYELINATING POLYNEUROPATHY) (H): Status: ACTIVE | Noted: 2019-05-03

## 2019-05-07 ENCOUNTER — TELEPHONE (OUTPATIENT)
Dept: FAMILY MEDICINE | Facility: CLINIC | Age: 59
End: 2019-05-07

## 2019-05-07 ENCOUNTER — MEDICAL CORRESPONDENCE (OUTPATIENT)
Dept: HEALTH INFORMATION MANAGEMENT | Facility: CLINIC | Age: 59
End: 2019-05-07

## 2019-05-08 ENCOUNTER — PATIENT OUTREACH (OUTPATIENT)
Dept: CARE COORDINATION | Facility: CLINIC | Age: 59
End: 2019-05-08

## 2019-05-08 ENCOUNTER — TELEPHONE (OUTPATIENT)
Dept: FAMILY MEDICINE | Facility: CLINIC | Age: 59
End: 2019-05-08

## 2019-05-08 DIAGNOSIS — R60.0 BILATERAL LEG EDEMA: Primary | ICD-10-CM

## 2019-05-08 ASSESSMENT — ACTIVITIES OF DAILY LIVING (ADL): DEPENDENT_IADLS:: TRANSPORTATION

## 2019-05-08 NOTE — LETTER
[facility]  [address] [place] [address]  [phone]    3  May 8, 2019    Abhijeet Mccauley  [address] [place] [id number]      Dear Abhijeet,    I am a clinic care coordinator- that works with Chidi Valenzuela MD at the [facility].  I just tried calling you, but your voicemail box is not set up for me to leave you a message.  I wanted to provide you with my contact information so that you can call me with questions or concerns about your health care. Below is a description of clinic care coordination and how I can further assist you.     The clinic care coordinator is a registered nurse and/or  who understand the health care system. The goal of clinic care coordination is to help you manage your health and improve access to the [facility] system in the most efficient manner. The registered nurse can assist you in meeting your health care goals by providing education, coordinating services, and strengthening the communication among your providers. The  can assist you with financial, behavioral, psychosocial, chemical dependency, counseling, and/or psychiatric resources.    Please feel free to contact me at [phone], with any questions or concerns. We at [facility] are focused on providing you with the highest-quality healthcare experience possible and that all starts with you.     Sincerely,     Leonor Han    Enclosed: I have enclosed a copy of the Complex Care Plan. This has helpful information and goals that we have talked about. Please keep this in an easy to access place to use as needed.

## 2019-05-08 NOTE — PROGRESS NOTES
Clinic Care Coordination Contact  [facility]/Voicemail    Referral Source: Return from [facility].  Per EMR, pt was discharged to his home with [facility] services for RN, PT & OT services.  Pt has seen his PCP for follow up cares as well.      Clinical Data: Care Coordinator Outreach    Outreach attempted x 1.  [facility] unable to leave a message as pt's voicemail box is not set up.      Plan: Care Coordinator mailed out care coordination introduction letter on 5-8-19. Care Coordinator will try to reach patient again in 5-15 business days.    Leonor Ortiz  Social Work Care Coordinator  [facility]  [phone]

## 2019-05-08 NOTE — TELEPHONE ENCOUNTER
Reason for Call:  Home Health Care    Martina with [facility] called regarding (reason for call): OT    Orders are needed for this patient.     OT:  1 visit next week,   Compression socks 2 pairs level 15-20. Faxed to [facility] [phone]    Phone Number Homecare Nurse can be reached at: [phone]    Can we leave a detailed message on this number? YES    Phone number patient can be reached at: Home number on file [phone] (home)    Best Time: any    Call taken on 5/8/2019 at 11:15 AM by Karen Menjivar

## 2019-05-08 NOTE — LETTER
[facility]  Complex Care Plan  About Me:    Patient Name:  Abhijeet Mariscal    YOB: 1960  Age:         58 year old   Haresh MRN:    [id number] Telephone Information:  Home Phone [phone]   Mobile [phone]       Address:  [address] [place] [address] Email address:  No e-mail address on record      Emergency Contact(s)    Name Relationship Lgl Grd Work Phone Home Phone Mobile Phone   1. RICHARD PEOPLES Sister    [phone]   2. MONIE MARISCAL Other   [phone]    3. HERMELINDA MUNOZ Mother   [phone]            Primary language:  English     needed? No   Other communication barriers: Hearing aides, Physical impairment, Glasses  Preferred Method of Communication:  Phone  Current living arrangement: I live in an apartment in a garage.    Mobility Status/ Medical Equipment: Independent w/Device    Health Maintenance  Health Maintenance Reviewed: Due/Overdue:  Health Maintenance Due   Topic Date Due     PREVENTIVE CARE VISIT  1960     SPIROMETRY ONETIME  1960     COPD ACTION PLAN Q1 YR  01/09/1959     HIV SCREEN (SYSTEM ASSIGNED)  12/09/1976     HEPATITIS C SCREENING  12/09/1976     ZOSTER IMMUNIZATION (1 of 2) 12/09/2008     ADVANCE DIRECTIVE PLANNING Q5 YRS  12/09/2013     LIPID SCREEN Q5 YR MALE (SYSTEM ASSIGNED)  09/01/2014     ASTHMA ACTION PLAN Q1 YR  03/28/2019     My Access Plan  Medical Emergency 911   Primary Clinic Line [facility] - [phone]   24 Hour Appointment Line [phone] or  [phone] ([phone]) (toll-free)   24 Hour Nurse Line [phone] (toll-free)   Preferred Urgent Care [facility] - [place], [phone]   Preferred [facility] [place], [place]  [phone]   Preferred Pharmacy [facility] [place] [address]     Behavioral Health Crisis Line The National Suicide Prevention Lifeline at 1-718.518.1239 or 911       My Care Team  Members  Patient Care Team       Relationship Specialty Notifications Start End    Chidi Valenzeula MD PCP - General Internal Medicine  12/5/18     Phone: [phone] Fax: [phone] [address] [facility] [address]    Kellie Putnam, RN Specialty Care Coordinator Oncology Admissions 2/22/19     Phone: [phone]         Leonor Han LSW Lead Care Coordinator Primary Care - CC Admissions 3/15/19     Phone: [phone] Fax: [phone]        [facility]  Licensed [facility] Health  3/18/19     [facility]  for his [employer]    Phone: [phone]                 My Care Plans  Self Management and Treatment Plan  Goals and (Comments)  Goals        General    Functional (pt-stated)     Notes - Note created  3/15/2019  4:24 PM by Leonor Han LSW    Goal Statement: I need help getting services for Home Care, DME and to know why I can't walk  Measure of Success: Pt will better understand his medical dx  Supportive Steps to Achieve: Sw discussed with PCP and Home Care orders were placed to evaluate pt in his home.  Barriers: Pt has hx of alcoholism, has been abrupt with staff, and not consistent with his follow up/cares  Strengths: Pt and his brother are asking for assistance with services.  Date to Achieve By: 1 month  Patient expressed understanding of goal: Yes                 Action Plans on File: Asthma     Advance Care Plans/Directives Type: None       My Medical and Care Information  Problem List   Patient Active Problem List   Diagnosis     Essential hypertension     Acute myeloid leukemia in remission (H)     Sensorineural hearing loss, asymmetrical     Alcoholic cirrhosis of liver (H)     Acute alcoholic hepatitis     Coagulation defect (H)     Osteoarthrosis     Thrombocytopenia (H)     Universal ulcerative (chronic) colitis(556.6) (H)     CKD (chronic kidney disease) stage 3, GFR 30-59 ml/min (H)     Rosacea     Mild intermittent asthma without  complication     Peptic ulcer disease     Uncomplicated alcohol dependence (H)     Tobacco dependence syndrome     Tubular adenoma of colon     Normocytic anemia     Leukocytosis with increased monocytes     Generalized weakness     COPD exacerbation (H)     AIDP (acute inflammatory demyelinating polyneuropathy) (H)      Current Medications and Allergies:  See printed Medication Report.    Care Coordination Start Date: 3/15/2019   Frequency of Care Coordination: monthly   Form Last Updated: 05/08/2019

## 2019-05-08 NOTE — TELEPHONE ENCOUNTER
[facility]   [address] [facility]  [address] [place] [address], [address]   Hours:   Monday-Friday 9 a.m.--5 p.m.   Phone: [phone]  Fax: [phone]  Email:  [email or web address]    Dr Valenzuela,Please see note below.  is this ok? Please print and sign the compression stockings .   Thanks, .Tiffani Pemberton RNC

## 2019-05-14 ENCOUNTER — TELEPHONE (OUTPATIENT)
Dept: FAMILY MEDICINE | Facility: CLINIC | Age: 59
End: 2019-05-14

## 2019-05-14 NOTE — TELEPHONE ENCOUNTER
"Abhijeet Mccauley is a 58 year old male who calls with Back pain.    NURSING ASSESSMENT:  Description:  Back pain in the middle low back pain.  Onset/duration:  Chronic.  Pain scale (0-10)   9/10 at worst, 6-7/10 all the time.     Last exam/Treatment:  5/2/19  Allergies:   Allergies   Allergen Reactions     Blood Transfusion Related (Informational Only)      Patient has a history of a clinically significant antibody against RBC antigens.  A delay in compatible RBCs may occur.     Famotidine Unknown and Other (See Comments)     Severe abdominal cramps     Cyclobenzaprine Hives     Methocarbamol Other (See Comments)     Made pt's \"face break out\"     Pepcid Cramps     Severe abdominal cramps     Vancomycin Other (See Comments)     hallucinations     Vfend Other (See Comments)     IV - cold sweats, Iron tablet makes him nauseated and stomach ached     Hydrocodone-Acetaminophen Rash       MEDICATIONS:   Taking medication(s) as prescribed? Yes  Taking over the counter medication(s?) Yes  Any medication side effects? No significant side effects    Any barriers to taking medication(s) as prescribed? Unknown  Medication(s) improving/managing symptoms?  Yes  Medication reconciliation completed: Yes      NURSING PLAN: Nursing advice to patient needs OV.     RECOMMENDED DISPOSITION:  Recommend Office visit with provider for evaluation, patient scheduled for 5/22/19 at 1:40pm, declined sooner appointment.   Will comply with recommendation: Yes  If further questions/concerns or if symptoms do not improve, worsen or new symptoms develop, call your PCP or [facility] as soon as possible.      Guideline used:  Telephone Triage Protocols for Nurses, Fifth Edition, Do Renteria RN    "

## 2019-05-14 NOTE — TELEPHONE ENCOUNTER
Reason for Call:  Other prescription    Detailed comments:  Pt is calling for back pain medication, but it is in therapy home care now and still no relief.  Pt was seen 5/2/19 with Dr. Rivero, did talk about a bit.  Please Advise, pt not sure of the medication that he took before.    Phone Number Patient can be reached at: Cell number on file:    Telephone Information:   Mobile [phone]       Best Time: any    Can we leave a detailed message on this number? YES    Call taken on 5/14/2019 at 8:44 AM by Catherine Russell

## 2019-05-16 ENCOUNTER — TELEPHONE (OUTPATIENT)
Dept: FAMILY MEDICINE | Facility: CLINIC | Age: 59
End: 2019-05-16

## 2019-05-16 DIAGNOSIS — Z53.9 DIAGNOSIS NOT YET DEFINED: Primary | ICD-10-CM

## 2019-05-16 PROCEDURE — G0180 MD CERTIFICATION HHA PATIENT: HCPCS | Performed by: INTERNAL MEDICINE

## 2019-05-17 ENCOUNTER — TELEPHONE (OUTPATIENT)
Dept: FAMILY MEDICINE | Facility: CLINIC | Age: 59
End: 2019-05-17

## 2019-05-17 ENCOUNTER — MEDICAL CORRESPONDENCE (OUTPATIENT)
Dept: HEALTH INFORMATION MANAGEMENT | Facility: CLINIC | Age: 59
End: 2019-05-17

## 2019-05-19 ENCOUNTER — NURSE TRIAGE (OUTPATIENT)
Dept: NURSING | Facility: CLINIC | Age: 59
End: 2019-05-19

## 2019-05-20 ENCOUNTER — PATIENT OUTREACH (OUTPATIENT)
Dept: CARE COORDINATION | Facility: CLINIC | Age: 59
End: 2019-05-20

## 2019-05-20 ASSESSMENT — ACTIVITIES OF DAILY LIVING (ADL): DEPENDENT_IADLS:: TRANSPORTATION

## 2019-05-20 NOTE — TELEPHONE ENCOUNTER
Hm, yeah, he probably doesn't need all three of those primary care appointments.  The first one is scheduled with me, so I'll discuss this with him at that visit and likely cancel the other two, unless we make some changes and want to keep one of them for follow-up.    Thanks,  Chidi Valnezuela MD

## 2019-05-20 NOTE — TELEPHONE ENCOUNTER
Patient calling regarding ongoing (over a month) of hand numbness. No change in symptoms recently but he is concerned and would like to figure out why it is happening.    Reason for Disposition    [1] Numbness or tingling in one or both hands AND [2] is a chronic symptom (recurrent or ongoing AND present > 4 weeks)    Additional Information    Negative: [1] SEVERE weakness (i.e., unable to walk or barely able to walk, requires support) AND [2] new onset or worsening    Negative: [1] Weakness (i.e., paralysis, loss of muscle strength) of the face, arm / hand, or leg / foot on one side of the body AND [2] sudden onset AND [3] present now    Negative: [1] Numbness (i.e., loss of sensation) of the face, arm / hand, or leg / foot on one side of the body AND [2] sudden onset AND [3] present now    Negative: [1] Loss of speech or garbled speech AND [2] sudden onset AND [3] present now    Negative: Difficult to awaken or acting confused (e.g., disoriented, slurred speech)    Negative: Sounds like a life-threatening emergency to the triager    Negative: Confusion, disorientation, or hallucinations is main symptom    Negative: Neck pain is main symptom (and having weakness, numbness, or tingling in arm / hand because of neck pain)    Negative: Back pain is main symptom (and having weakness, numbness, or tingling in leg because of back pain)    Negative: Hand pain is main symptom (and having mild weakness, numbness, or tingling in hand related to hand pain)    Negative: Dizziness is main symptom    Negative: Vision loss or change is main symptom    Negative: Followed a head injury within last 3 days    Negative: Followed a neck injury within last 3 days    Negative: [1] Tingling in both hands and/or feet AND [2] breathing faster than normal AND [3] feels similar to prior panic attack or hyperventilation episode    Negative: Weakness in both sides of the body or weakness all over    Negative: Headache  (and neurologic  deficit)    Negative: [1] Back pain AND [2] numbness (loss of sensation) in groin or rectal area    Negative: [1] Unable to urinate (or only a few drops) > 4 hours AND [2] bladder feels very full (e.g., palpable bladder or strong urge to urinate)    Negative: [1] Loss of control of bowel or bladder (i.e., incontinence) AND [2] new onset    Negative: [1] Weakness (i.e., paralysis, loss of muscle strength) of the face, arm / hand, or leg / foot on one side of the body AND [2] sudden onset AND [3] brief (now gone)    Negative: [1] Numbness (i.e., loss of sensation) of the face, arm / hand, or leg / foot on one side of the body AND [2] sudden onset AND [3] brief (now gone)    Negative: [1] Loss of speech or garbled speech AND [2] sudden onset AND [3] brief (now gone)    Negative: Bell's palsy suspected (i.e., weakness on only one side of the face, developing over hours to days, no other symptoms)    Negative: Patient sounds very sick or weak to the triager    Negative: [1] Weakness of arm / hand, or leg / foot AND [2] is a chronic symptom (recurrent or ongoing AND present > 4 weeks)    Negative: [1] Loss of speech or garbled speech AND [2] is a chronic symptom (recurrent or ongoing AND present > 4 weeks)    Negative: Neck pain  (and neurologic deficit)    Negative: Back pain  (and neurologic deficit)    Negative: [1] Weakness of the face, arm / hand, or leg / foot on one side of the body AND [2] gradual onset (e.g., days to weeks) AND [3] present now    Negative: [1] Numbness (i.e., loss of sensation) of the face, arm / hand, or leg / foot on one side of the body AND [2] gradual onset (e.g., days to weeks) AND [3] present now    Negative: [1] Loss of speech or garbled speech AND [2] gradual onset (e.g., days to weeks) AND [3] present now    Negative: [1] Tingling (e.g., pins and needles) of the face, arm / hand, or leg / foot on one side of the body AND [2] present now    Protocols used: NEUROLOGIC DEFICIT-A-AH

## 2019-05-20 NOTE — PROGRESS NOTES
"Clinic Care Coordination Contact    Clinic Care Coordination Contact  OUTREACH    Referral Information:  Referral Source: PCP  Primary Diagnosis: MI/CD, AIDP, CKD 3, COPD,     Pt returned this writer's call today.  He began by telling this writer that he is struggling at home, needs his Home Care team back, and wants this writer to fix his issues.  [facility] was able to calm the pt and discuss what his going on for him.    Pt shared that his Home Care team ended last week, but that he had an \"episode\" where his legs went numb, his hands went numb and he can't do anything for himself at this time.  Pt wants his Home Care RN to begin again.    SW explained to the pt that if he is having new medical symptoms, he should come to see his PCP or UC.  Pt declined wanting to do this, instead stating that he needs surgery on his hands & will be seeing the Ortho team on 5-24-17 at 12:40 PM.  SW instructed the pt this was 5 business days away and asked how he will care for himself at home.  Pt stated he will manage.    SW also discussed with the pt if living alone is in his best interest at this time & discussed going to a TCU again.  Pt stated TCU was worse than alf & he will not return there.     NIKO offered to contact Eleanor, his [employer] CM, but pt stated he just spoke with her and that I am saying the same things she did:  1.  PCP or UC for any new medical issues  2.  TCU for ongoing living concerns  3.  Follow up with Ortho on 5-24-19 and get home care orders, if needed.    Chief Complaint   Patient presents with     Clinic Care Coordination - Follow-up     social work      Universal Utilization: Clinic Utilization  Difficulty keeping appointments:: Yes  Compliance Concerns: Yes  No-Show Concerns: No  No PCP office visit in Past Year: No  Utilization    Last refreshed: 5/19/2019  7:32 PM:  Hospital Admissions 0           Last refreshed: 5/19/2019  7:32 PM:  ED Visits 3           Last refreshed: 5/19/2019  7:32 PM:  No Show Count " (past year) 6              Current as of: 5/19/2019  7:32 PM            Clinical Concerns:  Current Medical Concerns:  Pt shared his hands are numb and needs his Home Care team back.  Current Behavioral Concerns:  None, per pt  Education Provided to patient: See above     Pain  Pain (GOAL):: Yes  Type: Chronic (>3mo)  Location of chronic pain:: legs, back  Location pain radiates to: all over  Chronic pain severity:: 10  Limitation of routine activities due to chronic pain:: Yes  Description: Unable to perform most daily activities (chores, hobbies, social activities, driving)(Pt states he is unable to walk)    Health Maintenance Reviewed: Due/Overdue:  Health Maintenance Due   Topic Date Due     PREVENTIVE CARE VISIT  1960     SPIROMETRY  1960     HEPATITIS C SCREENING  1960     ASTHMA CONTROL TEST  1960     ADVANCED DIRECTIVE PLANNING  1960     COPD ACTION PLAN  1960     DEPRESSION ACTION PLAN  1960     HIV SCREENING  12/09/1973     LIPID  12/09/1993     ZOSTER IMMUNIZATION (1 of 2) 12/09/2008     PHQ-9  02/02/2019     Clinical Pathway: None    Medication Management:  Pt states he knows how to take his medications.  Has MA to afford them.     Functional Status:  Dependent ADLs:: Ambulation-walker  Dependent IADLs:: Transportation  Bed or wheelchair confined:: Yes  Mobility Status: Independent w/Device  Fallen 2 or more times in the past year?: Yes  Any fall with injury in the past year?: Yes    Living Situation:  Current living arrangement:: I live alone in an apartment in his mom's garage.  Type of residence:: Apartment in the garage.  Stairs are required to enter the main house & indoor plumbing.    Diet/Exercise/Sleep:  Diet:: Regular  Inadequate nutrition (GOAL):: No  Food Insecurity: No  Tube Feeding: No  Exercise:: Unable to exercise  Inadequate activity/exercise (GOAL):: No  Significant changes in sleep pattern (GOAL): No    Transportation:  Transportation concerns  (GOAL):: No.  Pt uses [employer] [phone]  Transportation means:: Medical transport     Psychosocial:  Samaritan or spiritual beliefs that impact treatment:: No  Mental health DX:: No  Mental health management concern (GOAL):: No  Informal Support system:: Other, Family     Financial/Insurance: SSDI & [employer] MA  Financial/Insurance concerns (GOAL):: No     Resources and Interventions:  Current Resources:  Family, friends,   Community Resources:  County Worker, Eleanor Madonna , [phone]  Supplies used at home:: None  Equipment Currently Used at Home: walker, standard, raised toilet, shower chair(ordered wheelchair, toilet raiser, shower chair and bed pull up bar)    Advance Care Plan/Directive  Advanced Care Plans/Directives on file:: No  Advanced Care Plan/Directive Status: Considering Options    Referrals Placed: None     Goals:   Goals        General    Functional (pt-stated)     Notes - Note created  3/15/2019  4:24 PM by Leonor Han LSW    Goal Statement: I need help getting services for Home Care, DME and to know why I can't walk  Measure of Success: Pt will better understand his medical dx  Supportive Steps to Achieve: Sw discussed with PCP and Home Care orders were placed to evaluate pt in his home.  Barriers: Pt has hx of alcoholism, has been abrupt with staff, and not consistent with his follow up/cares  Strengths: Pt and his brother are asking for assistance with services.  Date to Achieve By: 1 month  Patient expressed understanding of goal: Yes              Patient/Caregiver understanding: Pt will attend his Ortho appointment on 5-24-19 and if needed, will request Home Care Services.    Outreach Frequency: monthly  Future Appointments              In 2 days [facility]    In 4 days Nona Villareal MD [facility]    In 1 week Chidi Valenuzela MD [facility]  ANDRES Rubalcava        Plan: After speaking with the pt and documenting on our conversation, [facility] noticed that pt does not have an Ortho appointment on 5-24-19, but rather has 3 appointments with [facility] providers in the next week.  [facility] routed note to PCP and Clinic RN team to determine what appointments the pt will need to keep.    Leonor Ortiz  Social Work Care Coordinator  [facility]  [phone]

## 2019-05-21 ENCOUNTER — TELEPHONE (OUTPATIENT)
Dept: OTOLARYNGOLOGY | Facility: CLINIC | Age: 59
End: 2019-05-21

## 2019-05-21 NOTE — TELEPHONE ENCOUNTER
Reason for Call:  Other call back    Detailed comments: Pt states he lost his hearing aid.    Phone Number Patient can be reached at: Cell number on file:    Telephone Information:   Mobile [phone]     Best Time: any    Can we leave a detailed message on this number? No    Call taken on 5/21/2019 at 11:41 AM by Neelam Garcia

## 2019-05-21 NOTE — TELEPHONE ENCOUNTER
Patient reports he lost his right hearing aid. He also reports his left earmold tubing is plugged. Set up appointment for 5/22/19.    Ai KUNZ, #[id number]

## 2019-05-22 ENCOUNTER — OFFICE VISIT (OUTPATIENT)
Dept: AUDIOLOGY | Facility: CLINIC | Age: 59
End: 2019-05-22
Payer: COMMERCIAL

## 2019-05-22 ENCOUNTER — OFFICE VISIT (OUTPATIENT)
Dept: FAMILY MEDICINE | Facility: CLINIC | Age: 59
End: 2019-05-22
Payer: COMMERCIAL

## 2019-05-22 VITALS
DIASTOLIC BLOOD PRESSURE: 68 MMHG | OXYGEN SATURATION: 98 % | BODY MASS INDEX: 29.68 KG/M2 | SYSTOLIC BLOOD PRESSURE: 126 MMHG | TEMPERATURE: 97.5 F | RESPIRATION RATE: 16 BRPM | HEART RATE: 70 BPM | WEIGHT: 183.9 LBS

## 2019-05-22 DIAGNOSIS — K14.6 TONGUE PAIN: ICD-10-CM

## 2019-05-22 DIAGNOSIS — M47.818 OSTEOARTHRITIS OF SPINE WITHOUT MYELOPATHY OR RADICULOPATHY, SACRAL AND SACROCOCCYGEAL REGION: ICD-10-CM

## 2019-05-22 DIAGNOSIS — E51.11 THIAMINE DEFICIENCY NEUROPATHY: ICD-10-CM

## 2019-05-22 DIAGNOSIS — G93.40 ENCEPHALOPATHY: ICD-10-CM

## 2019-05-22 DIAGNOSIS — H90.3 SENSORINEURAL HEARING LOSS, ASYMMETRICAL: Primary | ICD-10-CM

## 2019-05-22 DIAGNOSIS — R20.0 BILATERAL HAND NUMBNESS: Primary | ICD-10-CM

## 2019-05-22 DIAGNOSIS — R60.0 PERIPHERAL EDEMA: ICD-10-CM

## 2019-05-22 PROCEDURE — 36415 COLL VENOUS BLD VENIPUNCTURE: CPT | Performed by: INTERNAL MEDICINE

## 2019-05-22 PROCEDURE — 99000 SPECIMEN HANDLING OFFICE-LAB: CPT | Performed by: INTERNAL MEDICINE

## 2019-05-22 PROCEDURE — 99207 ZZC NO CHARGE LOS: CPT | Performed by: AUDIOLOGIST

## 2019-05-22 PROCEDURE — 99214 OFFICE O/P EST MOD 30 MIN: CPT | Performed by: INTERNAL MEDICINE

## 2019-05-22 PROCEDURE — 84425 ASSAY OF VITAMIN B-1: CPT | Mod: 90 | Performed by: INTERNAL MEDICINE

## 2019-05-22 PROCEDURE — V5299 HEARING SERVICE: HCPCS | Performed by: AUDIOLOGIST

## 2019-05-22 RX ORDER — GABAPENTIN 300 MG/1
600 CAPSULE ORAL 3 TIMES DAILY
Qty: 180 CAPSULE | Refills: 11 | Status: SHIPPED | OUTPATIENT
Start: 2019-05-22 | End: 2020-05-28

## 2019-05-22 RX ORDER — SPIRONOLACTONE 50 MG/1
50 TABLET, FILM COATED ORAL DAILY
Qty: 90 TABLET | Refills: 3 | Status: SHIPPED | OUTPATIENT
Start: 2019-05-22 | End: 2019-07-08

## 2019-05-22 NOTE — PATIENT INSTRUCTIONS
Tongue pain may be from vitamin B deficiency- you should already be taking B1 vitamin, but I would suggest also try a B complex with multiple B vitamins.      Please contact the addiction medicine scheduling staff at [phone]     Stop the potassium- you are starting on spironolactone for swelling, which can increase potassium.

## 2019-05-22 NOTE — PROGRESS NOTES
"Subjective   Chief Complaint   Patient presents with     Edema     legs swelling     Medication Reconciliation     patient does not know the medicaitons he takes.       Abhijeet Mccauley is a 58 year old male who presents to clinic today for the following health issues:    HPI     I saw Abhijeet on 5/2 for hospital follow-up for AIDP.  He was started on gabapentin to help with numbness in the fingers and that was helping some, so we titrated it upward.  He had been on both lactulose and rifaximin for alcoholic cirrhosis but really doesn't like taking the lactulose, so we were going to try to hold this and see how he did on the rifaximin alone.        March 2019 lumbar spine MRI:    IMPRESSION:   1. No significant change since prior CT exam of 2/25/2019.  2. Multilevel early degenerative disc disease. Mild central stenosis  at L2-L3 related to multiple factors.  3. Mild foraminal stenosis at multiple levels bilaterally as described  above.  4. Chronic wedge compression deformity L1.    March 2019 cervical spine MRI:    IMPRESSION: Multilevel advanced degenerative disc disease from the C3  through the C7 level with mild central stenosis at some levels as  described above and without significant change since the prior exam.  Multilevel foraminal stenosis is probably unchanged since the prior  exam but difficult to evaluate on the current study due to motion  artifacts.    Today, he reports he had a spell last week and his hands were totally numb when he came out of it.  They have been getting \"three times worse\" since then.  He is having weakness and dropping things.  He reports he has an appointment with orthopedics on Friday with [facility].   He reports he increased the gabapentin to 600mg TID already and that doesn't seem to be helping much.      He is doing home therapy, last day is tomorrow.      Having a lot of memory issues.  Heart burn is returning, pantoprazole doesn't seem to be helpful.  He started to take cimetidine " and that seems to be helping.  Pain is getting worse- worst spot is the neck.  He is having dizziness with standing.  He says he has to have water with him 24-7 since he has dry mouth.  Dryness makes it difficult to swallow.  Spicy food is burning his tongue.  He feels like his penis shrunk.  Having some mild tingling with urination but no change in frequency or urgency, no     He is drinking a couple of beers per day.  He is not interested in AA because he is not [employer].        Edema       Duration: x 4 months.     Description (location/character/radiation): bilateral leg swelling     Intensity:  moderate    Accompanying signs and symptoms: trouble standing, walking.  Calves of legs getting very red.      History (similar episodes/previous evaluation): None    Precipitating or alleviating factors: None    Therapies tried and outcome: elevation    Walks a lot       Patient Active Problem List   Diagnosis     Essential hypertension     Acute myeloid leukemia in remission (H)     Sensorineural hearing loss, asymmetrical     Alcoholic cirrhosis of liver (H)     Acute alcoholic hepatitis     Coagulation defect (H)     Osteoarthrosis     Thrombocytopenia (H)     Universal ulcerative (chronic) colitis(556.6) (H)     CKD (chronic kidney disease) stage 3, GFR 30-59 ml/min (H)     Rosacea     Mild intermittent asthma without complication     Peptic ulcer disease     Uncomplicated alcohol dependence (H)     Tobacco dependence syndrome     Tubular adenoma of colon     Normocytic anemia     Leukocytosis with increased monocytes     Generalized weakness     COPD exacerbation (H)     AIDP (acute inflammatory demyelinating polyneuropathy) (H)     Past Surgical History:   Procedure Laterality Date     AS TOTAL KNEE ARTHROPLASTY Left      BONE MARROW BIOPSY, BONE SPECIMEN, NEEDLE/TROCAR N/A 6/12/2018    Procedure: BIOPSY BONE MARROW;  Bone Marrow Biopsy;  Surgeon: Demar Sapp MD;  Location: [facility]      ESOPHAGOSCOPY, GASTROSCOPY, DUODENOSCOPY (EGD), COMBINED N/A 2/8/2018    Procedure: COMBINED ESOPHAGOSCOPY, GASTROSCOPY, DUODENOSCOPY (EGD), BIOPSY SINGLE OR MULTIPLE;  gastroscopy with biopsies;  Surgeon: Raz Cobb MD;  Location: [facility]     ESOPHAGOSCOPY, GASTROSCOPY, DUODENOSCOPY (EGD), COMBINED N/A 3/18/2018    Procedure: COMBINED ESOPHAGOSCOPY, GASTROSCOPY, DUODENOSCOPY (EGD);;  Surgeon: Raz Cobb MD;  Location: [facility]     LACERATION REPAIR Right     Right leg       Social History     Tobacco Use     Smoking status: Current Every Day Smoker     Packs/day: 0.50     Years: 30.00     Pack years: 15.00     Types: Cigarettes     Smokeless tobacco: Never Used     Tobacco comment: I strongly emphasized the importance of quitting smoking. 4-5 daily   Substance Use Topics     Alcohol use: Yes     Comment: Down to 3 beers per day.  Sometimes a cocktail also.     Family History   Problem Relation Age of Onset     Hypertension Mother      Coronary Artery Disease Father         MI         Current Outpatient Medications   Medication Sig Dispense Refill     gabapentin (NEURONTIN) 300 MG capsule Take 2 capsules (600 mg) by mouth 3 times daily 180 capsule 11     spironolactone (ALDACTONE) 50 MG tablet Take 1 tablet (50 mg) by mouth daily 90 tablet 3     vitamin B complex with vitamin C (STRESS TAB) tablet Take 1 tablet by mouth daily 90 tablet 3     albuterol (VENTOLIN HFA) 108 (90 Base) MCG/ACT inhaler Inhale 2 puffs into the lungs every 4 hours as needed for shortness of breath / dyspnea or wheezing 18 g 11     baclofen (LIORESAL) 10 MG tablet Take 0.5-1 tablets (5-10 mg) by mouth 3 times daily Take 1/2 to one tablet up to three times a day 90 tablet 3     cholestyramine (QUESTRAN) 4 g packet Take 1 packet (4 g) by mouth 2 times daily (with meals) 60 each 11     desonide (DESOWEN) 0.05 % external lotion Apply topically as needed (Patient not taking: Reported on 5/2/2019) 118 mL 1     furosemide (LASIX) 20 MG tablet  "Take 1 tablet (20 mg) by mouth every morning 90 tablet 3     loratadine (CLARITIN) 10 MG tablet Take 1 tablet (10 mg) by mouth daily as needed for allergies 30 tablet 6     mometasone-formoterol (DULERA) 200-5 MCG/ACT oral inhaler Inhale 2 puffs into the lungs 2 times daily        order for DME Equipment being ordered: Compression Stockings TWO (2) Pairs, 15-20 mm Hg. 2 Package prn     order for DME Equipment being ordered: Manual wheelchair.  Estimated duration- 3 months 1 Units 0     order for DME Equipment being ordered: bed pull up bar 1 Units 0     order for DME Equipment being ordered: Wheelchair 1 each 0     order for DME Equipment being ordered: shower chair 1 Device 0     order for DME Equipment being ordered: Toilet seat riser 1 Device 0     pantoprazole (PROTONIX) 40 MG EC tablet TAKE ONE TABLET BY MOUTH EVERY MORNING BEFORE BREAKFAST 30 tablet 11     propranolol (INDERAL) 20 MG tablet Take 1 tablet (20 mg) by mouth 2 times daily 180 tablet 3     rifaximin (XIFAXAN) 550 MG TABS tablet Take 1 tablet (550 mg) by mouth 2 times daily 180 tablet 3     SODIUM BICARBONATE PO Take 650 mg by mouth daily as needed        tiotropium (SPIRIVA) 18 MCG capsule Inhale 1 capsule into the lungs daily Using PRN       traZODone (DESYREL) 50 MG tablet Take 1 tablet (50 mg) by mouth At Bedtime 90 tablet 3     vitamin B1 (THIAMINE) 100 MG tablet Take 1 tablet (100 mg) by mouth daily 90 tablet 3     Allergies   Allergen Reactions     Blood Transfusion Related (Informational Only)      Patient has a history of a clinically significant antibody against RBC antigens.  A delay in compatible RBCs may occur.     Famotidine Unknown and Other (See Comments)     Severe abdominal cramps     Cyclobenzaprine Hives     Methocarbamol Other (See Comments)     Made pt's \"face break out\"     Pepcid Cramps     Severe abdominal cramps     Vancomycin Other (See Comments)     hallucinations     Vfend Other (See Comments)     IV - cold sweats, Iron " tablet makes him nauseated and stomach ached     Hydrocodone-Acetaminophen Rash       Reviewed and updated as needed this visit by Provider         Review of Systems   ROS COMP: Constitutional, HEENT, MSK systems are negative, except as otherwise noted.      Objective    /68 (BP Location: Right arm, Patient Position: Sitting, Cuff Size: Adult Regular)   Pulse 70   Temp 97.5  F (36.4  C) (Tympanic)   Resp 16   Wt 83.4 kg (183 lb 14.4 oz)   SpO2 98%   BMI 29.68 kg/m    Body mass index is 29.68 kg/m .  Physical Exam   GENERAL: alert and no distress  HEENT: tongue is red and somewhat fissured, no white plaques  RESP: Normal effort, bibasilar crackles  CV: Regular rate and rhythm, no murmurs rubs gallops, moderate pitting edema in both legs to the knees  ABDOMEN: Distended and slightly tender to palpation diffusely  NEURO: He reports numbness in both arms up to the elbows to light touch sensation.  Hand  strength is slightly reduced    Diagnostic Test Results:  Labs reviewed in Epic  No results found for this or any previous visit (from the past 24 hour(s)).        Assessment & Plan     1. Bilateral hand numbness    He reports worsening hand pain and numbness.  He states that his symptoms were doing fairly well initially after his discharge for a IDP, but have recently gotten worse.  His antegrade strength only seems slightly reduced on exam today, which is reassuring.  He does have some spinal issues, so this may also be neuropathy from the neck.  Recommended repeating EMG to reevaluate this.  He is going to be seeing orthopedics for his spinal issues later this week per his report.    - ORTHO  REFERRAL    2. Thiamine deficiency neuropathy    He has thiamine deficiency from alcohol use, he is taking a supplement, levels in process.    - Vitamin B1 whole blood    3. Osteoarthritis of spine without myelopathy or radiculopathy, sacral and sacrococcygeal region    He reports he already increased his  gabapentin up to 2 capsules 3 times daily without improvement.  He says this is not been helpful at all today, though at last visit he reports that the gabapentin was helping.  We will continue at the 600 mg 3 times daily dosing for now and recheck again next week.    - gabapentin (NEURONTIN) 300 MG capsule; Take 2 capsules (600 mg) by mouth 3 times daily  Dispense: 180 capsule; Refill: 11    4. Tongue pain    No sign of thrush on exam, could be related to be vitamin deficiency.  He is already taking B1 supplement, but recommended trying a full B complex vitamin to see if that would be helpful.    - vitamin B complex with vitamin C (STRESS TAB) tablet; Take 1 tablet by mouth daily  Dispense: 90 tablet; Refill: 3    5. Peripheral edema    He has abdominal distention possibly related to ascites and also has peripheral edema, potentially a combination of his liver disease and CKD.  He is already taking Lasix, will add in a dose of spironolactone.  Recheck labs in edema with clinic follow-up visit next week.    - spironolactone (ALDACTONE) 50 MG tablet; Take 1 tablet (50 mg) by mouth daily  Dispense: 90 tablet; Refill: 3  - **Basic metabolic panel FUTURE 14d; Future    6. Encephalopathy    Discussed there is unclear role of monitoring ammonia level with hepatic encephalopathy, but hewishes to recheck this at his next lab draw for monitoring.    - Ammonia; Future    He continues to drink 2 beers per day per his report.  He reports having difficulty quitting.  He was previously referred to addiction medicine, and I recommend he schedule with them to follow-up.      Chidi Valenzuela MD  [facility] -

## 2019-05-22 NOTE — PROGRESS NOTES
[facility]         SUBJECTIVE:  Abhijeet Mccauley , 58 year old male reports he has lost his new right Phonak Bolero B50-MD hearing aid and earmold. He was fit with the hearing aids on 4/9/2019. He also reports his left earmold is plugged with wax.     OBJECTIVE:  Cleaned left earmold. Reviewed care and cleaning. Verified hearing aid functionality. Filled out loss and damage forms for hearing aid and earmold and faxed to manufacturers.     ASSESSMENT/PLAN:   Return to clinic for replacement hearing aid fitting in 2-3 weeks.     [employer], #[id number]

## 2019-05-28 LAB — VIT B1 BLD-MCNC: 134 NMOL/L (ref 70–180)

## 2019-05-30 NOTE — PROGRESS NOTES
Subjective     Abhijeet Mccauley is a 58 year old male who presents to clinic today for the following health issues:    Chief Complaint   Patient presents with     RECHECK     Discuss medications, he is having a hard time recalling his current medications.      Musculoskeletal Problem     recheck bilateral hand pain and numbness. Discussed repeating EMG which he has not done yet.      Blood Draw     B1 levels checked 5/22/19       I saw Abhijeet on May 22.  He was having worsening hand pain and numbness.  I recommended repeating EMG.  Have not yet received report that he has had this completed.  He reported that he was going to be seeing orthopedics for spinal issues later that week.  He was having some tongue symptoms, no evidence of thrush on exam.  His B1 level was improved, so I recommend trying a full B complex vitamin to see if that would be helpful.  We added a dose of spironolactone to his Lasix to help out with edema and abdominal distention.  He was still drinking and I recommended he schedule with addiction medicine.    He says he was never called by ortho to set up the EMG.  He didn't see orthopedics last week- says I told him not to, which I had not.  I had advised him he could cancel the appointment with Dr. Villareal, family medicine physician.  I think he may have been confused and thought that this provider was an orthopedic provider.  I am not sure he ever had an appointment set up with orthopedics.    He is still having stomach ache, heartburn, and pain in the hands.  He is on pantoprazole for the reflux, it is not helping.  Tagamet hasn't helped.      Tongue pain is worsening despite B complex vitamin and going into the lips now.  He feels the B may be irritating his stomach more.        Patient Active Problem List   Diagnosis     Essential hypertension     Acute myeloid leukemia in remission (H)     Sensorineural hearing loss, asymmetrical     Alcoholic cirrhosis of liver (H)     Acute alcoholic  hepatitis     Coagulation defect (H)     Osteoarthrosis     Thrombocytopenia (H)     Universal ulcerative (chronic) colitis(556.6) (H)     CKD (chronic kidney disease) stage 3, GFR 30-59 ml/min (H)     Rosacea     Mild intermittent asthma without complication     Peptic ulcer disease     Uncomplicated alcohol dependence (H)     Tobacco dependence syndrome     Tubular adenoma of colon     Normocytic anemia     Leukocytosis with increased monocytes     Generalized weakness     COPD exacerbation (H)     AIDP (acute inflammatory demyelinating polyneuropathy) (H)     Past Surgical History:   Procedure Laterality Date     AS TOTAL KNEE ARTHROPLASTY Left      BONE MARROW BIOPSY, BONE SPECIMEN, NEEDLE/TROCAR N/A 6/12/2018    Procedure: BIOPSY BONE MARROW;  Bone Marrow Biopsy;  Surgeon: Demar Sapp MD;  Location: [facility]     ESOPHAGOSCOPY, GASTROSCOPY, DUODENOSCOPY (EGD), COMBINED N/A 2/8/2018    Procedure: COMBINED ESOPHAGOSCOPY, GASTROSCOPY, DUODENOSCOPY (EGD), BIOPSY SINGLE OR MULTIPLE;  gastroscopy with biopsies;  Surgeon: Raz Cobb MD;  Location: [facility]     ESOPHAGOSCOPY, GASTROSCOPY, DUODENOSCOPY (EGD), COMBINED N/A 3/18/2018    Procedure: COMBINED ESOPHAGOSCOPY, GASTROSCOPY, DUODENOSCOPY (EGD);;  Surgeon: Raz Cobb MD;  Location: [facility]     LACERATION REPAIR Right     Right leg       Social History     Tobacco Use     Smoking status: Current Every Day Smoker     Packs/day: 0.50     Years: 30.00     Pack years: 15.00     Types: Cigarettes     Smokeless tobacco: Never Used     Tobacco comment: I strongly emphasized the importance of quitting smoking. 4-5 daily   Substance Use Topics     Alcohol use: Yes     Comment: Down to 3 beers per day.  Sometimes a cocktail also.     Family History   Problem Relation Age of Onset     Hypertension Mother      Coronary Artery Disease Father         MI         Current Outpatient Medications   Medication Sig Dispense Refill     albuterol (VENTOLIN HFA) 108 (90  Base) MCG/ACT inhaler Inhale 2 puffs into the lungs every 4 hours as needed for shortness of breath / dyspnea or wheezing 18 g 11     baclofen (LIORESAL) 10 MG tablet Take 0.5-1 tablets (5-10 mg) by mouth 3 times daily Take 1/2 to one tablet up to three times a day 90 tablet 3     cholestyramine (QUESTRAN) 4 g packet Take 1 packet (4 g) by mouth 2 times daily (with meals) 60 each 11     furosemide (LASIX) 20 MG tablet Take 1 tablet (20 mg) by mouth every morning 90 tablet 3     gabapentin (NEURONTIN) 300 MG capsule Take 2 capsules (600 mg) by mouth 3 times daily 180 capsule 11     loratadine (CLARITIN) 10 MG tablet Take 1 tablet (10 mg) by mouth daily as needed for allergies 30 tablet 6     mometasone-formoterol (DULERA) 200-5 MCG/ACT oral inhaler Inhale 2 puffs into the lungs 2 times daily        order for DME Equipment being ordered: Compression Stockings TWO (2) Pairs, 15-20 mm Hg. 2 Package prn     order for DME Equipment being ordered: Manual wheelchair.  Estimated duration- 3 months 1 Units 0     order for DME Equipment being ordered: bed pull up bar 1 Units 0     order for DME Equipment being ordered: Wheelchair 1 each 0     order for DME Equipment being ordered: shower chair 1 Device 0     order for DME Equipment being ordered: Toilet seat riser 1 Device 0     pantoprazole (PROTONIX) 40 MG EC tablet Take 1 tablet (40 mg) by mouth 2 times daily 60 tablet 0     pregabalin (LYRICA) 75 MG capsule Take 1 capsule (75 mg) by mouth 2 times daily 60 capsule 5     propranolol (INDERAL) 20 MG tablet Take 1 tablet (20 mg) by mouth 2 times daily 180 tablet 3     rifaximin (XIFAXAN) 550 MG TABS tablet Take 1 tablet (550 mg) by mouth 2 times daily 180 tablet 3     SODIUM BICARBONATE PO Take 650 mg by mouth daily as needed        spironolactone (ALDACTONE) 50 MG tablet Take 1 tablet (50 mg) by mouth daily 90 tablet 3     tiotropium (SPIRIVA) 18 MCG capsule Inhale 1 capsule into the lungs daily Using PRN       traZODone  "(DESYREL) 50 MG tablet Take 1 tablet (50 mg) by mouth At Bedtime 90 tablet 3     vitamin B complex with vitamin C (STRESS TAB) tablet Take 1 tablet by mouth daily 90 tablet 3     vitamin B1 (THIAMINE) 100 MG tablet Take 1 tablet (100 mg) by mouth daily 90 tablet 3     desonide (DESOWEN) 0.05 % external lotion Apply topically as needed (Patient not taking: Reported on 5/2/2019) 118 mL 1     Allergies   Allergen Reactions     Blood Transfusion Related (Informational Only)      Patient has a history of a clinically significant antibody against RBC antigens.  A delay in compatible RBCs may occur.     Famotidine Unknown and Other (See Comments)     Severe abdominal cramps     Cyclobenzaprine Hives     Methocarbamol Other (See Comments)     Made pt's \"face break out\"     Pepcid Cramps     Severe abdominal cramps     Vancomycin Other (See Comments)     hallucinations     Vfend Other (See Comments)     IV - cold sweats, Iron tablet makes him nauseated and stomach ached     Hydrocodone-Acetaminophen Rash       Reviewed and updated as needed this visit by Provider         Review of Systems   ROS COMP: Constitutional, MSK, neuro systems are negative, except as otherwise noted.      Objective    /78   Pulse 83   Temp 98.6  F (37  C) (Tympanic)   Resp 16   Ht 1.676 m (5' 6\")   Wt 82.9 kg (182 lb 12.8 oz)   SpO2 99%   BMI 29.50 kg/m    Body mass index is 29.5 kg/m .  Physical Exam   GENERAL: healthy, alert and no distress  MS: No pain to palpation of the hands, mild-moderate bilateral lower leg edema extending up to the knees, compression stockings in place  NEURO: he reports numbness to light touch sensation up to the elbows on both sides, Strength of the fingers is mildly decreased bilaterally          Assessment & Plan     1. Bilateral hand numbness  2. Peripheral polyneuropathy    He has not yet had EMG done.  He reports he was not given the phone number for this (although this was in his discharge summary from " last week) and that nobody called him.  I provided him with the phone number for scheduling this again.  He reports that the gabapentin is not helping out with his pain.  He is already on a fairly high dose given his kidney function.  I suggested trying Lyrica instead.  We will see if his insurance covers this.      - pregabalin (LYRICA) 75 MG capsule; Take 1 capsule (75 mg) by mouth 2 times daily  Dispense: 60 capsule; Refill: 5    3. Gastroesophageal reflux disease without esophagitis    He reports his reflux symptoms are not controlled on pantoprazole 40 mg daily.  We will temporarily increase this to twice per day to see if that helps.  He feels that his reflux is worse on the vitamins and wonders if he can stop these.  I would not recommend that he stop the B1 supplement given his ongoing alcohol use.  He is currently on a B complex vitamin to see if this helps with oral symptoms.  I recommended giving this another week or so to see if further treatment will help the oral symptoms, as he has not had any improvement.  If still not improving after another week, he can discontinue this.    - pantoprazole (PROTONIX) 40 MG EC tablet; Take 1 tablet (40 mg) by mouth 2 times daily  Dispense: 60 tablet; Refill: 0    4. Peripheral edema    Does appear slightly improved on exam today after adding spironolactone at last visit, labs are stable.  Continue current medications at this time.  Can consider further up titration of medications in the future if he is not having further improvement.    I note that he has cataract surgery scheduled soon, but no preop appointments scheduled.  He thinks that that a preop was not needed.  I advised that he contact the surgery team and make sure.        Patient Instructions   Call [phone] to see up the EMG nerve conduction study.      We'll try changing gabapentin to Lyrica if this is improved by insurance.    We'll increase pantoprazole to twice per day to see if that helps with  the stomach.    Continue the B complex for at least another week to see if that helps the tongue symptoms.      Call the eye doctor and see if they require you to have a pre-op appointment before surgery.        Return in about 1 week (around 6/7/2019) for pre-op appointment.    Chidi Valenzuela MD  [facility]

## 2019-05-31 ENCOUNTER — OFFICE VISIT (OUTPATIENT)
Dept: FAMILY MEDICINE | Facility: CLINIC | Age: 59
End: 2019-05-31
Payer: COMMERCIAL

## 2019-05-31 VITALS
BODY MASS INDEX: 29.38 KG/M2 | RESPIRATION RATE: 16 BRPM | HEIGHT: 66 IN | TEMPERATURE: 98.6 F | DIASTOLIC BLOOD PRESSURE: 78 MMHG | WEIGHT: 182.8 LBS | SYSTOLIC BLOOD PRESSURE: 138 MMHG | OXYGEN SATURATION: 99 % | HEART RATE: 83 BPM

## 2019-05-31 DIAGNOSIS — R20.0 BILATERAL HAND NUMBNESS: Primary | ICD-10-CM

## 2019-05-31 DIAGNOSIS — G62.9 PERIPHERAL POLYNEUROPATHY: ICD-10-CM

## 2019-05-31 DIAGNOSIS — K21.9 GASTROESOPHAGEAL REFLUX DISEASE WITHOUT ESOPHAGITIS: ICD-10-CM

## 2019-05-31 DIAGNOSIS — R60.0 PERIPHERAL EDEMA: ICD-10-CM

## 2019-05-31 DIAGNOSIS — R20.0 BILATERAL HAND NUMBNESS: ICD-10-CM

## 2019-05-31 DIAGNOSIS — G93.40 ENCEPHALOPATHY: ICD-10-CM

## 2019-05-31 LAB
AMMONIA PLAS-SCNC: 34 UMOL/L (ref 10–50)
ANION GAP SERPL CALCULATED.3IONS-SCNC: 5 MMOL/L (ref 3–14)
BUN SERPL-MCNC: 15 MG/DL (ref 7–30)
CALCIUM SERPL-MCNC: 9 MG/DL (ref 8.5–10.1)
CHLORIDE SERPL-SCNC: 108 MMOL/L (ref 94–109)
CO2 SERPL-SCNC: 24 MMOL/L (ref 20–32)
CREAT SERPL-MCNC: 2.01 MG/DL (ref 0.66–1.25)
GFR SERPL CREATININE-BSD FRML MDRD: 35 ML/MIN/{1.73_M2}
GLUCOSE SERPL-MCNC: 108 MG/DL (ref 70–99)
POTASSIUM SERPL-SCNC: 4.2 MMOL/L (ref 3.4–5.3)
SODIUM SERPL-SCNC: 137 MMOL/L (ref 133–144)

## 2019-05-31 PROCEDURE — 99214 OFFICE O/P EST MOD 30 MIN: CPT | Performed by: INTERNAL MEDICINE

## 2019-05-31 PROCEDURE — 82140 ASSAY OF AMMONIA: CPT | Performed by: INTERNAL MEDICINE

## 2019-05-31 PROCEDURE — 80048 BASIC METABOLIC PNL TOTAL CA: CPT | Performed by: INTERNAL MEDICINE

## 2019-05-31 PROCEDURE — 36415 COLL VENOUS BLD VENIPUNCTURE: CPT | Performed by: INTERNAL MEDICINE

## 2019-05-31 RX ORDER — PANTOPRAZOLE SODIUM 40 MG/1
40 TABLET, DELAYED RELEASE ORAL 2 TIMES DAILY
Qty: 60 TABLET | Refills: 0 | Status: SHIPPED | OUTPATIENT
Start: 2019-05-31 | End: 2019-06-26

## 2019-05-31 RX ORDER — PREGABALIN 75 MG/1
75 CAPSULE ORAL 2 TIMES DAILY
Qty: 60 CAPSULE | Refills: 5 | Status: SHIPPED | OUTPATIENT
Start: 2019-05-31 | End: 2019-09-30

## 2019-05-31 ASSESSMENT — PAIN SCALES - GENERAL: PAINLEVEL: SEVERE PAIN (7)

## 2019-05-31 ASSESSMENT — MIFFLIN-ST. JEOR: SCORE: 1581.93

## 2019-05-31 NOTE — TELEPHONE ENCOUNTER
Prior Authorization Retail Medication Request    Medication/Dose: Lyrica  ICD code (if different than what is on RX):    Previously Tried and Failed:    Rationale:      Insurance Name:  [facility]  Insurance ID:  [id number]      Pharmacy Information (if different than what is on RX)  Name:  [facility]  Phone:  [phone]    Thank you,  Tammy Miller - Pharmacy Technician  [facility]  [phone]

## 2019-05-31 NOTE — NURSING NOTE
"Chief Complaint   Patient presents with     RECHECK     Discuss medications, he is having a hard time recalling his current medications.      Musculoskeletal Problem     recheck bilateral hand pain and numbness. Discussed repeating EMG which he has not done yet.      Blood Draw     B1 levels checked 5/22/19       Initial /78   Pulse 83   Temp 98.6  F (37  C) (Tympanic)   Resp 16   Ht 1.676 m (5' 6\")   Wt 82.9 kg (182 lb 12.8 oz)   SpO2 99%   BMI 29.50 kg/m   Estimated body mass index is 29.5 kg/m  as calculated from the following:    Height as of this encounter: 1.676 m (5' 6\").    Weight as of this encounter: 82.9 kg (182 lb 12.8 oz).    Medication Reconciliation: complete  Keli Unger MA  "

## 2019-05-31 NOTE — Clinical Note
Update on Abhijeet- I'm pretty sure he never had an ortho appointment scheduled like he said he did; I think he was confused and thought Dr. Villareal was with ortho.  He has not yet scheduled his EMG, I gave him the phone number for this again today.  He has cataract surgery scheduled but no pre-op appointment; he thinks he doesn't need one, but I advised him to call the eye doc and make sure.Elodia

## 2019-05-31 NOTE — PATIENT INSTRUCTIONS
Call [phone] to see up the EMG nerve conduction study.      We'll try changing gabapentin to Lyrica if this is improved by insurance.    We'll increase pantoprazole to twice per day to see if that helps with the stomach.    Continue the B complex for at least another week to see if that helps the tongue symptoms.      Call the eye doctor and see if they require you to have a pre-op appointment before surgery.

## 2019-06-03 ENCOUNTER — PATIENT OUTREACH (OUTPATIENT)
Dept: CARE COORDINATION | Facility: CLINIC | Age: 59
End: 2019-06-03

## 2019-06-03 ENCOUNTER — TELEPHONE (OUTPATIENT)
Dept: FAMILY MEDICINE | Facility: CLINIC | Age: 59
End: 2019-06-03

## 2019-06-03 RX ORDER — PREGABALIN 75 MG/1
75 CAPSULE ORAL 2 TIMES DAILY
Qty: 60 CAPSULE | Refills: 5 | Status: CANCELLED | OUTPATIENT
Start: 2019-06-03

## 2019-06-03 ASSESSMENT — ACTIVITIES OF DAILY LIVING (ADL): DEPENDENT_IADLS:: TRANSPORTATION

## 2019-06-03 NOTE — PROGRESS NOTES
"Clinic Care Coordination Contact    Clinic Care Coordination Contact  OUTREACH    Referral Information:  Referral Source: PCP, \"He has not yet scheduled his EMG.  Pt has cataract surgery scheduled but no pre-op appointment; he thinks he doesn't need one, but I advised him to call the eye doc and make sure.\"    Primary Diagnosis: Psychosocial    SW reviewed pt's EMR and then placed a call to the pt.  [facility] introduced self, title, and role using AIDET format.  SW explained that that the pt's PCP requested that I follow up with the pt to discuss above.    Pt shared that his eye surgery is already scheduled, therefore, he will not need other appointments related to it.  CC explained that some surgical procedures MAY need a pre-op appointment and asked the pt what was told him at time of scheduling?  Pt could not recall any special needs.    [facility] sent an Epic inbox message to pt's Ophthalmologist, Roxanne, requesting any needs the pt may have prior to surgery.  Awaiting response.    Pt has yet to schedule for EMG, SW offered to help with scheduling, but states he will call, \"sometime this week, maybe.\"    Chief Complaint   Patient presents with     Clinic Care Coordination - Follow-up     social work       Universal Utilization: Clinic Utilization  Difficulty keeping appointments:: Yes  Compliance Concerns: Yes  No-Show Concerns: No  No PCP office visit in Past Year: No  Utilization    Last refreshed: 5/31/2019  7:08 PM:  Hospital Admissions 0           Last refreshed: 5/31/2019  7:08 PM:  ED Visits 3           Last refreshed: 5/31/2019  7:08 PM:  No Show Count (past year) 6              Current as of: 5/31/2019  7:08 PM            Clinical Concerns:  Current Medical Concerns:  Pt complains of leg pain today    Current Behavioral Concerns: Nothing new    Education Provided to patient: See above     Pain  Pain (GOAL):: Yes  Type: Chronic (>3mo)  Location of chronic pain:: legs, back  Location pain radiates to: all " over  Chronic pain severity:: 10  Limitation of routine activities due to chronic pain:: Yes  Description: Unable to perform most daily activities (chores, hobbies, social activities, driving)    Health Maintenance Reviewed: Due/Overdue:  Health Maintenance Due   Topic Date Due     PREVENTIVE CARE VISIT  1960     SPIROMETRY  1960     HEPATITIS C SCREENING  1960     ASTHMA CONTROL TEST  1960     ADVANCED DIRECTIVE PLANNING  1960     COPD ACTION PLAN  1960     DEPRESSION ACTION PLAN  1960     HIV SCREENING  12/09/1973     ZOSTER IMMUNIZATION (1 of 2) 12/09/2008     LIPID  09/01/2014     PHQ-9  02/02/2019     ASTHMA ACTION PLAN  03/28/2019     Clinical Pathway: None    Medication Management:  Pt states he is taking as prescribed.     Functional Status:  Dependent ADLs:: Ambulation-walker(Pt unable to stand with PT today)  Dependent IADLs:: Transportation  Bed or wheelchair confined:: Yes  Mobility Status: Independent w/Device  Fallen 2 or more times in the past year?: Yes  Any fall with injury in the past year?: Yes    Living Situation:  Current living arrangement:: I live alone(Pt lives in an apartment in his mom's garage.)  Type of residence:: Apartment(In a MIL apartment in the garage.  Stairs are required to enter the main house & indoor plumbing, which that pt is unable to naviagate at this time)    Diet/Exercise/Sleep:  Diet:: Regular  Inadequate nutrition (GOAL):: No  Food Insecurity: No  Tube Feeding: No  Exercise:: Unable to exercise  Inadequate activity/exercise (GOAL):: No  Significant changes in sleep pattern (GOAL): No    Transportation:  Transportation concerns (GOAL):: No([employer] [address] [phone])  Transportation means:: Medical transport     Psychosocial:  Alevism or spiritual beliefs that impact treatment:: No  Mental health DX:: No  Mental health management concern (GOAL):: No  Informal Support system:: Other, Family     Financial/Insurance: SSDI/  Medicare & MA  Financial/Insurance concerns (GOAL):: No     Resources and Interventions:  Current Resources: Family  Community Resources: , County Worker, Madonna Webster , [phone]  Supplies used at home:: None  Equipment Currently Used at Home: walker, standard, raised toilet, shower chair(ordered wheelchair, toilet raiser, shower chair and bed pull up bar)    Advance Care Plan/Directive  Advanced Care Plans/Directives on file:: No  Advanced Care Plan/Directive Status: Considering Options    Referrals Placed: None     Goals:   Goals        General    Psychosocial (pt-stated)     Notes - Note created  6/3/2019 11:43 AM by Leonor Han LSW    Goal Statement: I will attend my medical appointments as recommended  Measure of Success: Pt will be aware of why he is scheduled & will attend medical appointments  Supportive Steps to Achieve: CC to assist pt with understanding of his medical cares & reasons for appointments  Barriers: Pt stated he has limited transportation & ability to attend appointments  Strengths: Pt wishes to remain in his own home, motivated to remain healthy  Date to Achieve By: October 1, 2019  Patient expressed understanding of goal: Yes                Patient/Caregiver understanding: Pt is aware that this writer is contacting Ophthalmologist, Roxanne, to discuss pre-surgical needs, if any, and will call the pt back with information.     Outreach Frequency: monthly  Future Appointments              In 1 week Ai Min, [facility]        Plan: SW to continue to follow.    Leonor Ortiz  Social Work Care Coordinator  [facility]  [phone]

## 2019-06-03 NOTE — TELEPHONE ENCOUNTER
Tammy Miller 3 days ago         Prior Authorization Retail Medication Request     Medication/Dose: Lyrica  ICD code (if different than what is on RX):    Previously Tried and Failed:    Rationale:       Insurance Name:  [facility]  Insurance ID:  [id number]        Pharmacy Information (if different than what is on RX)  Name:  [facility]  Phone:  [phone]     Thank you,  Tammy Miller - Pharmacy Technician  [facility]  [phone]

## 2019-06-03 NOTE — TELEPHONE ENCOUNTER
PA Initiation    Medication: pregabalin (LYRICA) 75 MG capsule- INITIATED  Insurance Company: [employer] - Phone [phone] Fax [phone]  Pharmacy Filling the Rx: [facility] [place], [place] - [facility]  Filling Pharmacy Phone: [phone]  Filling Pharmacy Fax:    Start Date: 6/3/2019    Waiting for clinical questions.    Abhijeet Mccauley Go: [id number]

## 2019-06-03 NOTE — TELEPHONE ENCOUNTER
Prior Authorization Approval    Authorization Effective Date: 5/4/2019  Authorization Expiration Date: 6/2/2020  Medication: pregabalin (LYRICA) 75 MG capsule- APPROVED  Approved Dose/Quantity: 60 per 30 days  Reference #:     Insurance Company: [employer] - Phone [phone] Fax [phone]  Expected CoPay:       CoPay Card Available:      Foundation Assistance Needed:    Which Pharmacy is filling the prescription (Not needed for infusion/clinic administered): [facility] [address]  Pharmacy Notified: Yes  Patient Notified: Yes    **Instructed pharmacy to notify patient when script is ready to /ship.**  Will attach approval letter from insurance when received

## 2019-06-10 ENCOUNTER — TELEPHONE (OUTPATIENT)
Dept: FAMILY MEDICINE | Facility: CLINIC | Age: 59
End: 2019-06-10

## 2019-06-10 DIAGNOSIS — K14.6 TONGUE PAIN: Primary | ICD-10-CM

## 2019-06-10 RX ORDER — CLOTRIMAZOLE 10 MG/1
10 LOZENGE ORAL
Qty: 70 TROCHE | Refills: 0 | Status: SHIPPED | OUTPATIENT
Start: 2019-06-10 | End: 2019-07-16

## 2019-06-10 NOTE — H&P
[facility]  [facility]  [address]  [facility] [address]  [phone]  Dept: [phone]    OPHTHALMOLOGY PRE-OPERATIVE  HISTORY AND PHYSICAL    DATE OF H/P:  2019    DATE OF SURGERY:  2019  PROCEDURE:  Procedure(s):  cataract removal with implant., Left Eye  LENS IMPLANT:  ZCB00 +22.5  REFRACTIVE GOAL:  PL Sph  SURGEON:  Adrian Oliveira MD    ANESTHESIA:  TOPICAL / MAC    OR CASE REQUIREMENTS:    DEMOGRAPHICS:  Demographic Information on Abhijeet Mccauley:    Abhijeet Mccauley  Gender: male  : 1960  [address]  [place] [address]  [phone] (home)     Medical Record: [id number]  Social Security Number: [id number]  Pharmacy:    [facility] - [place] - [address]  [facility] - [place], [place] [address] [facility]  [employer]  [employer]  Primary Care Provider: Chidi Valenzuela    Parent's names are: Pili Eden (mother) and Data Unavailable (father).    Insurance: Payor: [employer] / Plan: UCARE CONNECT [place] ONLY / Product Type: HMO /     OCULAR HISTORY:  Cataracts, each eye.  ARMD/ ERM each eye.    HISTORIES:  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)     s/p chemo, no bone marrow transplant     Chronic obstructive pulmonary disease 2018     COPD (chronic obstructive pulmonary disease) (H)      History of pulmonary embolus (PE)      Hypertension      PUD (peptic ulcer disease)      Tobacco dependence      Ulcerative colitis (H)        Past Surgical History:   Procedure Laterality Date     AS TOTAL KNEE ARTHROPLASTY Left      BONE MARROW BIOPSY, BONE SPECIMEN, NEEDLE/TROCAR N/A 2018    Procedure: BIOPSY BONE MARROW;  Bone Marrow Biopsy;  Surgeon: Demar Sapp MD;  Location: [facility]     ESOPHAGOSCOPY, GASTROSCOPY, DUODENOSCOPY (EGD),  COMBINED N/A 2/8/2018    Procedure: COMBINED ESOPHAGOSCOPY, GASTROSCOPY, DUODENOSCOPY (EGD), BIOPSY SINGLE OR MULTIPLE;  gastroscopy with biopsies;  Surgeon: Raz Cobb MD;  Location: [facility]     ESOPHAGOSCOPY, GASTROSCOPY, DUODENOSCOPY (EGD), COMBINED N/A 3/18/2018    Procedure: COMBINED ESOPHAGOSCOPY, GASTROSCOPY, DUODENOSCOPY (EGD);;  Surgeon: Raz Cobb MD;  Location: [facility]     LACERATION REPAIR Right     Right leg       Family History   Problem Relation Age of Onset     Hypertension Mother      Coronary Artery Disease Father         MI       Social History     Tobacco Use     Smoking status: Current Every Day Smoker     Packs/day: 0.50     Years: 30.00     Pack years: 15.00     Types: Cigarettes     Smokeless tobacco: Never Used     Tobacco comment: I strongly emphasized the importance of quitting smoking. 4-5 daily   Substance Use Topics     Alcohol use: Yes     Comment: Down to 3 beers per day.  Sometimes a cocktail also.       MEDICATIONS:  No current facility-administered medications for this encounter.      Current Outpatient Medications   Medication Sig     albuterol (VENTOLIN HFA) 108 (90 Base) MCG/ACT inhaler Inhale 2 puffs into the lungs every 4 hours as needed for shortness of breath / dyspnea or wheezing     baclofen (LIORESAL) 10 MG tablet Take 0.5-1 tablets (5-10 mg) by mouth 3 times daily Take 1/2 to one tablet up to three times a day     cholestyramine (QUESTRAN) 4 g packet Take 1 packet (4 g) by mouth 2 times daily (with meals)     desonide (DESOWEN) 0.05 % external lotion Apply topically as needed     furosemide (LASIX) 20 MG tablet Take 1 tablet (20 mg) by mouth every morning     gabapentin (NEURONTIN) 300 MG capsule Take 2 capsules (600 mg) by mouth 3 times daily     loratadine (CLARITIN) 10 MG tablet Take 1 tablet (10 mg) by mouth daily as needed for allergies     mometasone-formoterol (DULERA) 200-5 MCG/ACT oral inhaler Inhale 2 puffs into the lungs 2 times daily      pregabalin  "(LYRICA) 75 MG capsule Take 1 capsule (75 mg) by mouth 2 times daily     propranolol (INDERAL) 20 MG tablet Take 1 tablet (20 mg) by mouth 2 times daily     rifaximin (XIFAXAN) 550 MG TABS tablet Take 1 tablet (550 mg) by mouth 2 times daily     SODIUM BICARBONATE PO Take 650 mg by mouth daily as needed      spironolactone (ALDACTONE) 50 MG tablet Take 1 tablet (50 mg) by mouth daily     tiotropium (SPIRIVA) 18 MCG capsule Inhale 1 capsule into the lungs daily Using PRN     traZODone (DESYREL) 50 MG tablet Take 1 tablet (50 mg) by mouth At Bedtime     vitamin B complex with vitamin C (STRESS TAB) tablet Take 1 tablet by mouth daily     vitamin B1 (THIAMINE) 100 MG tablet Take 1 tablet (100 mg) by mouth daily     order for DME Equipment being ordered: Compression Stockings TWO (2) Pairs, 15-20 mm Hg.     order for DME Equipment being ordered: Manual wheelchair.  Estimated duration- 3 months     order for DME Equipment being ordered: bed pull up bar     order for DME Equipment being ordered: Wheelchair     order for DME Equipment being ordered: shower chair     order for DME Equipment being ordered: Toilet seat riser     pantoprazole (PROTONIX) 40 MG EC tablet Take 1 tablet (40 mg) by mouth 2 times daily       ALLERGIES:     Allergies   Allergen Reactions     Blood Transfusion Related (Informational Only)      Patient has a history of a clinically significant antibody against RBC antigens.  A delay in compatible RBCs may occur.     Famotidine Unknown and Other (See Comments)     Severe abdominal cramps     Cyclobenzaprine Hives     Methocarbamol Other (See Comments)     Made pt's \"face break out\"     Pepcid Cramps     Severe abdominal cramps     Vancomycin Other (See Comments)     hallucinations     Vfend Other (See Comments)     IV - cold sweats, Iron tablet makes him nauseated and stomach ached     Hydrocodone-Acetaminophen Rash       PERTINENT SYSTEMS REVIEW:    1. No - Do you have a history of heart attack, " stroke, stent, bypass or surgery on an artery in the head, neck, heart or legs?  2. No - Do you ever have any pain or discomfort in your chest?  3. No - Do you have a history of  Heart Failure?  4. No - Are you troubled by shortness of breath when walking: On the level, up a slight hill or at night?  5. No - Do you currently have a cold, bronchitis or other respiratory infection?  6. No - Do you have a cough, shortness of breath or wheezing?  7. No - Do you sometimes get pains in the calves of your legs when you walk?  8. No - Do you or anyone in your family have previous history of blood clots?  9. No - Do you or does anyone in your family have a serious bleeding problem such as prolonged bleeding following surgeries or cuts?  10. No - Have you ever had problems with anemia or been told to take iron pills?  11. No - Have you had any abnormal blood loss such as black, tarry or bloody stools, or abnormal vaginal bleeding?  12. No - Have you ever had a blood transfusion?  13. No - Have you or any of your relatives ever had problems with anesthesia?  14. No - Do you have sleep apnea, excessive snoring or daytime drowsiness?  15. No - Do you have any prosthetic heart valves?  16. Yes: knee replacement - Do you have prosthetic joints?    EXAMINATION:  Vitals were reviewed                     Vison:  Va, right - 20/30, left - 20/40;   BAT, right - 20/70, left - 20/80;  HEENT:  Cataract, otherwise unremarkable.  LUNGS:  Clear  CV:  Regular rate and rhythm without murmur  ABD:  Soft and nontender  NEURO:  Alert and nonfocal    IMPRESSION:  Patient cleared for ophthalmic surgery.  Low risk with monitored, light sedation.  I have assessed the patient's DVT risk, and no additional orders necessary.    PLAN:  Procedure(s):  cataract removal with implant., Left Eye      Adrian Oliveira MD

## 2019-06-10 NOTE — TELEPHONE ENCOUNTER
He has not had any clinic sign of thrush on recent exams, but we can try empiric treatment for this given his persistent symptoms.  Prescription for clotrimazole sent to our pharmacy- try this for two weeks, follow-up if not improving.  Thanks.    Chidi Valenzuela MD

## 2019-06-10 NOTE — TELEPHONE ENCOUNTER
Reason for call:  Patient reporting a symptom    Symptom or request: Pt can hardly eat anything with spice. Tongue hurts. When swallows tongue tingles. What else can he do? He was seen for this 5/31/19    Duration (how long have symptoms been present): 3 weeks    Have you been treated for this before? Yes    Additional comments:     Phone Number patient can be reached at:  Cell number on file:    Telephone Information:   Mobile [phone]       Best Time:  any    Can we leave a detailed message on this number:  YES    Call taken on 6/10/2019 at 12:12 PM by Rena Kaur

## 2019-06-10 NOTE — TELEPHONE ENCOUNTER
Attempted to contact patient but his mailbox is full and can not accept any further messages at this time  Catherine WANG RN

## 2019-06-10 NOTE — TELEPHONE ENCOUNTER
Dr. Valenzuela,    Patient is contacted.  We have revisited his last office AVS.      He is currently on a B complex vitamin to see if this helps with oral symptoms.  I recommended giving this another week or so to see if further treatment will help the oral symptoms, as he has not had any improvement.  If still not improving after another week, he can discontinue this.    Return in about 1 week (around 6/7/2019) for pre-op appointment.     Chidi Valenzuela MD  [facility]     Patient states he is having eye surgery and can not be seen right now.  Then the patient goes on about coming in several times already and nothing is being done for his tongue.  How he just can not keep doing this.  Please advise. Catherine WANG RN

## 2019-06-11 ENCOUNTER — ANESTHESIA EVENT (OUTPATIENT)
Dept: SURGERY | Facility: CLINIC | Age: 59
End: 2019-06-11
Payer: COMMERCIAL

## 2019-06-11 ASSESSMENT — COPD QUESTIONNAIRES: COPD: 1

## 2019-06-11 ASSESSMENT — LIFESTYLE VARIABLES: TOBACCO_USE: 1

## 2019-06-11 NOTE — ANESTHESIA PREPROCEDURE EVALUATION
Anesthesia Pre-Procedure Evaluation    Patient: Abhijeet Mccauley   MRN: [id number] : 1960          Preoperative Diagnosis: Left cataract    Procedure(s):  cataract removal with implant.    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)     s/p chemo, no bone marrow transplant     Chronic obstructive pulmonary disease 2018     COPD (chronic obstructive pulmonary disease) (H)      History of pulmonary embolus (PE)      Hypertension      PUD (peptic ulcer disease)      Tobacco dependence      Ulcerative colitis (H)      Past Surgical History:   Procedure Laterality Date     AS TOTAL KNEE ARTHROPLASTY Left      BONE MARROW BIOPSY, BONE SPECIMEN, NEEDLE/TROCAR N/A 2018    Procedure: BIOPSY BONE MARROW;  Bone Marrow Biopsy;  Surgeon: Demar Sapp MD;  Location: [facility]     ESOPHAGOSCOPY, GASTROSCOPY, DUODENOSCOPY (EGD), COMBINED N/A 2018    Procedure: COMBINED ESOPHAGOSCOPY, GASTROSCOPY, DUODENOSCOPY (EGD), BIOPSY SINGLE OR MULTIPLE;  gastroscopy with biopsies;  Surgeon: Raz Cobb MD;  Location: [facility]     ESOPHAGOSCOPY, GASTROSCOPY, DUODENOSCOPY (EGD), COMBINED N/A 3/18/2018    Procedure: COMBINED ESOPHAGOSCOPY, GASTROSCOPY, DUODENOSCOPY (EGD);;  Surgeon: Raz Cobb MD;  Location: [facility]     LACERATION REPAIR Right     Right leg       Anesthesia Evaluation     .             ROS/MED HX    ENT/Pulmonary:     (+)tobacco use, Current use Intermittent asthma Treatment: Inhaler prn,  moderate COPD, , . Other pulmonary disease hx of PE.    Neurologic:  - neg neurologic ROS     Cardiovascular:     (+) hypertension----. : . . . :. .       METS/Exercise Tolerance:     Hematologic:     (+) Anemia, Other Hematologic Disorder-coagulation defect; leukocytosis with increased monocytes; hx of leukemia--in remission      Musculoskeletal:   (+)  arthritis,  other musculoskeletal- AIDP (acute inflammatory demyelinating polyneuropathy)      GI/Hepatic: Comment: PUD    (+) liver disease, Other GI/Hepatic ETOH dependence; hx of ulcerative colitis; tubular adenoma of colon      Renal/Genitourinary:     (+) chronic renal disease,       Endo:         Psychiatric:  - neg psychiatric ROS       Infectious Disease:         Malignancy:   (+) Malignancy History of Lymphoma/Leukemia  Lymph CA Remission status post,         Other: Comment: Rosacea  Left cataract     - neg other ROS                      Physical Exam  Normal systems: cardiovascular and dental    Airway   Mallampati: II  TM distance: >3 FB  Neck ROM: full    Dental     Cardiovascular       Pulmonary (+) wheezes               Lab Results   Component Value Date    WBC 9.7 03/15/2019    HGB 9.2 (L) 03/15/2019    HCT 30.3 (L) 03/15/2019     (L) 03/15/2019    CRP 33.8 (H) 03/15/2019    SED 73 (H) 03/15/2019     05/31/2019    POTASSIUM 4.2 05/31/2019    CHLORIDE 108 05/31/2019    CO2 24 05/31/2019    BUN 15 05/31/2019    CR 2.01 (H) 05/31/2019     (H) 05/31/2019    BEE 9.0 05/31/2019    PHOS 5.1 (H) 12/22/2008    MAG 2.0 03/05/2019    ALBUMIN 3.5 03/15/2019    PROTTOTAL 7.7 03/15/2019    ALT 28 03/15/2019    AST 37 03/15/2019    ALKPHOS 220 (H) 03/15/2019    BILITOTAL 1.6 (H) 03/15/2019    LIPASE 314 03/05/2019    STEW 34 05/31/2019    PTT 36 03/09/2019    INR 1.47 (H) 03/09/2019    FIBR 323 12/10/2014    TSH 0.94 12/05/2018       Preop Vitals  BP Readings from Last 3 Encounters:   05/31/19 138/78   05/22/19 126/68   05/02/19 132/68    Pulse Readings from Last 3 Encounters:   05/31/19 83   05/22/19 70   05/02/19 75      Resp Readings from Last 3 Encounters:   05/31/19 16   05/22/19 16   05/02/19 16    SpO2 Readings from Last 3 Encounters:   05/31/19 99%   05/22/19 98%   05/02/19 98%      Temp Readings from Last 1 Encounters:   05/31/19 37  C (98.6  F) (Tympanic)    Ht Readings from Last 1  "Encounters:   05/31/19 1.676 m (5' 6\")      Wt Readings from Last 1 Encounters:   05/31/19 82.9 kg (182 lb 12.8 oz)    Estimated body mass index is 29.5 kg/m  as calculated from the following:    Height as of 5/31/19: 1.676 m (5' 6\").    Weight as of 5/31/19: 82.9 kg (182 lb 12.8 oz).       Anesthesia Plan      History & Physical Review  History and physical reviewed and following examination; no interval change.    ASA Status:  4 .        Plan for MAC          Postoperative Care      Consents  Anesthetic plan, risks, benefits and alternatives discussed with:  Patient..                 LOPEZ Sloan CRNA  "

## 2019-06-11 NOTE — TELEPHONE ENCOUNTER
Writer called patient and informed him that the medication had been sent to the pharmacy and writer informed him of the instructions from Dr Valenzuela to return in two weeks if the symptoms haven't gotten better.    No further action necessary.  Encounter closed.    Vincent Dockery RN

## 2019-06-12 ENCOUNTER — ANESTHESIA (OUTPATIENT)
Dept: SURGERY | Facility: CLINIC | Age: 59
End: 2019-06-12
Payer: COMMERCIAL

## 2019-06-24 ENCOUNTER — MEDICAL CORRESPONDENCE (OUTPATIENT)
Dept: HEALTH INFORMATION MANAGEMENT | Facility: CLINIC | Age: 59
End: 2019-06-24

## 2019-06-25 ENCOUNTER — TELEPHONE (OUTPATIENT)
Dept: FAMILY MEDICINE | Facility: CLINIC | Age: 59
End: 2019-06-25

## 2019-06-26 DIAGNOSIS — K21.9 GASTROESOPHAGEAL REFLUX DISEASE WITHOUT ESOPHAGITIS: ICD-10-CM

## 2019-06-26 NOTE — TELEPHONE ENCOUNTER
"Requested Prescriptions   Pending Prescriptions Disp Refills     pantoprazole (PROTONIX) 40 MG EC tablet [Pharmacy Med Name: PANTOPRAZOLE SODIUM 40MG TBEC] 60 tablet 0     Sig: TAKE ONE TABLET BY MOUTH TWICE A DAY       PPI Protocol Passed - 6/26/2019 12:43 PM        Passed - Not on Clopidogrel (unless Pantoprazole ordered)        Passed - No diagnosis of osteoporosis on record        Passed - Recent (12 mo) or future (30 days) visit within the authorizing provider's specialty     Patient had office visit in the last 12 months or has a visit in the next 30 days with authorizing provider or within the authorizing provider's specialty.  See \"Patient Info\" tab in inbasket, or \"Choose Columns\" in Meds & Orders section of the refill encounter.              Passed - Medication is active on med list        Passed - Patient is age 18 or older        Last Written Prescription Date:  5/31/19  Last Fill Quantity: 60,  # refills: 0   Last office visit: 5/31/2019 with prescribing provider:  Nicolas   Future Office Visit:      "

## 2019-06-28 RX ORDER — PANTOPRAZOLE SODIUM 40 MG/1
40 TABLET, DELAYED RELEASE ORAL DAILY
Qty: 90 TABLET | Refills: 3 | Status: SHIPPED | OUTPATIENT
Start: 2019-06-28 | End: 2020-02-26

## 2019-06-28 NOTE — TELEPHONE ENCOUNTER
S:    B: Per 5/31/19 dictation:    3. Gastroesophageal reflux disease without esophagitis     He reports his reflux symptoms are not controlled on pantoprazole 40 mg daily.  We will temporarily increase this to twice per day to see if that helps.  He feels that his reflux is worse on the vitamins and wonders if he can stop these.  I would not recommend that he stop the B1 supplement given his ongoing alcohol use.  He is currently on a B complex vitamin to see if this helps with oral symptoms.  I recommended giving this another week or so to see if further treatment will help the oral symptoms, as he has not had any improvement.  If still not improving after another week, he can discontinue this.     - pantoprazole (PROTONIX) 40 MG EC tablet; Take 1 tablet (40 mg) by mouth 2 times daily  Dispense: 60 tablet; Refill: 0    A: Has the increase helped?    Patient reports increased dose has helped with his symptoms.       R: Ok to continue Protonix BID?

## 2019-06-28 NOTE — TELEPHONE ENCOUNTER
Writer informed patient.    Patient is in agreement with the plan to decrease pantoprazole.    No further action necessary.  Encounter closed.    Vincent Dockery RN

## 2019-06-28 NOTE — TELEPHONE ENCOUNTER
Recommend trying to decrease back to once per day if tolerate.  Prescription sent for once daily.  Call if symptoms are worsening.    Thanks,  Chidi Valenzuela MD

## 2019-07-01 ENCOUNTER — HOSPITAL ENCOUNTER (OUTPATIENT)
Facility: CLINIC | Age: 59
Discharge: HOME | End: 2019-07-01
Attending: OPHTHALMOLOGY | Admitting: OPHTHALMOLOGY
Payer: COMMERCIAL

## 2019-07-01 VITALS
HEART RATE: 68 BPM | TEMPERATURE: 97.9 F | WEIGHT: 182 LBS | BODY MASS INDEX: 29.25 KG/M2 | RESPIRATION RATE: 16 BRPM | SYSTOLIC BLOOD PRESSURE: 129 MMHG | OXYGEN SATURATION: 98 % | HEIGHT: 66 IN | DIASTOLIC BLOOD PRESSURE: 45 MMHG

## 2019-07-01 PROCEDURE — 25800030 ZZH RX IP 258 OP 636: Performed by: NURSE ANESTHETIST, CERTIFIED REGISTERED

## 2019-07-01 PROCEDURE — 25000125 ZZHC RX 250: Performed by: NURSE ANESTHETIST, CERTIFIED REGISTERED

## 2019-07-01 PROCEDURE — 25000125 ZZHC RX 250: Performed by: OPHTHALMOLOGY

## 2019-07-01 PROCEDURE — 37000012 ZZH ANESTHESIA CATARACT PACKAGE: Performed by: OPHTHALMOLOGY

## 2019-07-01 PROCEDURE — 36000025 ZZH CATARACT SURGICAL PACKAGE: Performed by: OPHTHALMOLOGY

## 2019-07-01 PROCEDURE — 71000022 ZZH RECOVERY CATRACT PACKAGE: Performed by: OPHTHALMOLOGY

## 2019-07-01 PROCEDURE — 25000128 H RX IP 250 OP 636: Performed by: NURSE ANESTHETIST, CERTIFIED REGISTERED

## 2019-07-01 PROCEDURE — V2632 POST CHMBR INTRAOCULAR LENS: HCPCS | Performed by: OPHTHALMOLOGY

## 2019-07-01 PROCEDURE — 25000128 H RX IP 250 OP 636: Performed by: OPHTHALMOLOGY

## 2019-07-01 DEVICE — EYE IMP IOL AMO PCL TECNIS ZCB00 22.5: Type: IMPLANTABLE DEVICE | Site: EYE | Status: FUNCTIONAL

## 2019-07-01 RX ORDER — TROPICAMIDE 10 MG/ML
1 SOLUTION/ DROPS OPHTHALMIC
Status: DISCONTINUED | OUTPATIENT
Start: 2019-07-01 | End: 2019-07-01

## 2019-07-01 RX ORDER — CYCLOPENTOLATE HYDROCHLORIDE 10 MG/ML
1 SOLUTION/ DROPS OPHTHALMIC
Status: COMPLETED | OUTPATIENT
Start: 2019-07-01 | End: 2019-07-01

## 2019-07-01 RX ORDER — CYCLOPENTOLATE HYDROCHLORIDE 10 MG/ML
1 SOLUTION/ DROPS OPHTHALMIC
Status: DISCONTINUED | OUTPATIENT
Start: 2019-07-01 | End: 2019-07-01

## 2019-07-01 RX ORDER — LIDOCAINE 40 MG/G
CREAM TOPICAL
Status: DISCONTINUED | OUTPATIENT
Start: 2019-07-01 | End: 2019-07-01 | Stop reason: HOSPADM

## 2019-07-01 RX ORDER — PHENYLEPHRINE HYDROCHLORIDE 25 MG/ML
1 SOLUTION/ DROPS OPHTHALMIC
Status: DISCONTINUED | OUTPATIENT
Start: 2019-07-01 | End: 2019-07-01

## 2019-07-01 RX ORDER — TROPICAMIDE 10 MG/ML
1 SOLUTION/ DROPS OPHTHALMIC
Status: COMPLETED | OUTPATIENT
Start: 2019-07-01 | End: 2019-07-01

## 2019-07-01 RX ORDER — PROPARACAINE HYDROCHLORIDE 5 MG/ML
SOLUTION/ DROPS OPHTHALMIC PRN
Status: DISCONTINUED | OUTPATIENT
Start: 2019-07-01 | End: 2019-07-01 | Stop reason: HOSPADM

## 2019-07-01 RX ORDER — PHENYLEPHRINE HYDROCHLORIDE 25 MG/ML
1 SOLUTION/ DROPS OPHTHALMIC
Status: COMPLETED | OUTPATIENT
Start: 2019-07-01 | End: 2019-07-01

## 2019-07-01 RX ORDER — SODIUM CHLORIDE, SODIUM LACTATE, POTASSIUM CHLORIDE, CALCIUM CHLORIDE 600; 310; 30; 20 MG/100ML; MG/100ML; MG/100ML; MG/100ML
INJECTION, SOLUTION INTRAVENOUS CONTINUOUS
Status: DISCONTINUED | OUTPATIENT
Start: 2019-07-01 | End: 2019-07-01 | Stop reason: HOSPADM

## 2019-07-01 RX ORDER — LIDOCAINE HYDROCHLORIDE 20 MG/ML
JELLY TOPICAL PRN
Status: DISCONTINUED | OUTPATIENT
Start: 2019-07-01 | End: 2019-07-01 | Stop reason: HOSPADM

## 2019-07-01 RX ORDER — BALANCED SALT SOLUTION 6.4; .75; .48; .3; 3.9; 1.7 MG/ML; MG/ML; MG/ML; MG/ML; MG/ML; MG/ML
SOLUTION OPHTHALMIC PRN
Status: DISCONTINUED | OUTPATIENT
Start: 2019-07-01 | End: 2019-07-01 | Stop reason: HOSPADM

## 2019-07-01 RX ADMIN — TROPICAMIDE 1 DROP: 10 SOLUTION/ DROPS OPHTHALMIC at 09:40

## 2019-07-01 RX ADMIN — LIDOCAINE HYDROCHLORIDE 0.5 ML: 10 INJECTION, SOLUTION EPIDURAL; INFILTRATION; INTRACAUDAL; PERINEURAL at 09:45

## 2019-07-01 RX ADMIN — SODIUM CHLORIDE, POTASSIUM CHLORIDE, SODIUM LACTATE AND CALCIUM CHLORIDE: 600; 310; 30; 20 INJECTION, SOLUTION INTRAVENOUS at 09:45

## 2019-07-01 RX ADMIN — TROPICAMIDE 1 DROP: 10 SOLUTION/ DROPS OPHTHALMIC at 09:56

## 2019-07-01 RX ADMIN — CYCLOPENTOLATE HYDROCHLORIDE 1 DROP: 10 SOLUTION/ DROPS OPHTHALMIC at 09:41

## 2019-07-01 RX ADMIN — TROPICAMIDE 1 DROP: 10 SOLUTION/ DROPS OPHTHALMIC at 09:46

## 2019-07-01 RX ADMIN — MIDAZOLAM 2 MG: 1 INJECTION INTRAMUSCULAR; INTRAVENOUS at 10:25

## 2019-07-01 RX ADMIN — CYCLOPENTOLATE HYDROCHLORIDE 1 DROP: 10 SOLUTION/ DROPS OPHTHALMIC at 09:57

## 2019-07-01 RX ADMIN — PHENYLEPHRINE HYDROCHLORIDE 1 DROP: 25 SOLUTION/ DROPS OPHTHALMIC at 09:41

## 2019-07-01 RX ADMIN — PHENYLEPHRINE HYDROCHLORIDE 1 DROP: 25 SOLUTION/ DROPS OPHTHALMIC at 09:56

## 2019-07-01 RX ADMIN — PHENYLEPHRINE HYDROCHLORIDE 1 DROP: 25 SOLUTION/ DROPS OPHTHALMIC at 09:47

## 2019-07-01 RX ADMIN — CYCLOPENTOLATE HYDROCHLORIDE 1 DROP: 10 SOLUTION/ DROPS OPHTHALMIC at 09:47

## 2019-07-01 ASSESSMENT — MIFFLIN-ST. JEOR: SCORE: 1578.3

## 2019-07-01 ASSESSMENT — COPD QUESTIONNAIRES: CAT_SEVERITY: MODERATE

## 2019-07-01 NOTE — ANESTHESIA POSTPROCEDURE EVALUATION
Patient: Abhijeet Mccauley    Procedure(s):  cataract removal with implant.    Diagnosis:Left cataract  Diagnosis Additional Information: No value filed.    Anesthesia Type:  MAC    Note:  Anesthesia Post Evaluation    Patient location during evaluation: Bedside  Patient participation: Able to fully participate in evaluation  Level of consciousness: awake  Pain management: adequate  Airway patency: patent  Cardiovascular status: acceptable  Respiratory status: acceptable  Hydration status: stable  PONV: none     Anesthetic complications: None          Last vitals:  Vitals:    07/01/19 0915   BP: 136/60   Pulse: 74   Resp: 16   Temp: 36.6  C (97.9  F)   SpO2: 97%         Electronically Signed By: LOPEZ Almazan CRNA  July 1, 2019  10:49 AM

## 2019-07-01 NOTE — H&P
[facility]  [facility]  [address]  [facility] [place] [address]  [phone]  Dept: [phone]    OPHTHALMOLOGY PRE-OPERATIVE  HISTORY AND PHYSICAL    DATE OF H/P:  2019    DATE OF SURGERY:  2019  PROCEDURE:  Procedure(s):  cataract removal with implant., Left Eye  SURGEON:  Adrian Oliveira MD    ANESTHESIA:  TOPICAL / MAC    OR CASE REQUIREMENTS:    DEMOGRAPHICS:  Demographic Information on Abhijeet Mccauley:    Abhijeet Mccauley  Gender: male  : 1960  [address] [place] [address]  [phone] (home)     Medical Record: [id number]  Social Security Number: [id number]  Pharmacy:    [facility] - [place] - [address]  [facility] [place], [place] - [address] [facility]  [employer]  [employer] [facility] [employer] [facility]  Primary Care Provider: Chidi Valenzuela    Parent's names are: Pili Eden (mother) and Data Unavailable (father).    Insurance: Payor: [employer] / Plan: [employer] MA ONLY / Product Type: HMO /     OCULAR HISTORY:  Cataracts, each eye.  ARMD / ERM each eye.    HISTORIES:  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)     s/p chemo, no bone marrow transplant     Chronic obstructive pulmonary disease 2018     COPD (chronic obstructive pulmonary disease) (H)      History of pulmonary embolus (PE)      Hypertension      PUD (peptic ulcer disease)      Tobacco dependence      Ulcerative colitis (H)        Past Surgical History:   Procedure Laterality Date     AS TOTAL KNEE ARTHROPLASTY Left      BONE MARROW BIOPSY, BONE SPECIMEN, NEEDLE/TROCAR N/A 2018    Procedure: BIOPSY BONE MARROW;  Bone Marrow Biopsy;  Surgeon: Demar Sapp MD;  Location: [facility]     ESOPHAGOSCOPY, GASTROSCOPY, DUODENOSCOPY (EGD), COMBINED N/A 2018    Procedure: COMBINED  ESOPHAGOSCOPY, GASTROSCOPY, DUODENOSCOPY (EGD), BIOPSY SINGLE OR MULTIPLE;  gastroscopy with biopsies;  Surgeon: Raz Cobb MD;  Location: [facility]     ESOPHAGOSCOPY, GASTROSCOPY, DUODENOSCOPY (EGD), COMBINED N/A 3/18/2018    Procedure: COMBINED ESOPHAGOSCOPY, GASTROSCOPY, DUODENOSCOPY (EGD);;  Surgeon: Raz Cobb MD;  Location: [facility]     LACERATION REPAIR Right     Right leg       Family History   Problem Relation Age of Onset     Hypertension Mother      Coronary Artery Disease Father         MI       Social History     Tobacco Use     Smoking status: Current Every Day Smoker     Packs/day: 0.50     Years: 30.00     Pack years: 15.00     Types: Cigarettes     Smokeless tobacco: Never Used     Tobacco comment: I strongly emphasized the importance of quitting smoking. 4-5 daily   Substance Use Topics     Alcohol use: Yes     Comment: Down to 3 beers per day.  Sometimes a cocktail also.       MEDICATIONS:  No current facility-administered medications for this encounter.      Current Outpatient Medications   Medication Sig     albuterol (VENTOLIN HFA) 108 (90 Base) MCG/ACT inhaler Inhale 2 puffs into the lungs every 4 hours as needed for shortness of breath / dyspnea or wheezing     baclofen (LIORESAL) 10 MG tablet Take 0.5-1 tablets (5-10 mg) by mouth 3 times daily Take 1/2 to one tablet up to three times a day     cholestyramine (QUESTRAN) 4 g packet Take 1 packet (4 g) by mouth 2 times daily (with meals)     desonide (DESOWEN) 0.05 % external lotion Apply topically as needed     furosemide (LASIX) 20 MG tablet Take 1 tablet (20 mg) by mouth every morning     gabapentin (NEURONTIN) 300 MG capsule Take 2 capsules (600 mg) by mouth 3 times daily     loratadine (CLARITIN) 10 MG tablet Take 1 tablet (10 mg) by mouth daily as needed for allergies     mometasone-formoterol (DULERA) 200-5 MCG/ACT oral inhaler Inhale 2 puffs into the lungs 2 times daily      order for DME Equipment being ordered: bed pull up bar      "order for DME Equipment being ordered: Wheelchair     order for DME Equipment being ordered: shower chair     order for DME Equipment being ordered: Toilet seat riser     pregabalin (LYRICA) 75 MG capsule Take 1 capsule (75 mg) by mouth 2 times daily     propranolol (INDERAL) 20 MG tablet Take 1 tablet (20 mg) by mouth 2 times daily     rifaximin (XIFAXAN) 550 MG TABS tablet Take 1 tablet (550 mg) by mouth 2 times daily     SODIUM BICARBONATE PO Take 650 mg by mouth daily as needed      spironolactone (ALDACTONE) 50 MG tablet Take 1 tablet (50 mg) by mouth daily     tiotropium (SPIRIVA) 18 MCG capsule Inhale 1 capsule into the lungs daily Using PRN     traZODone (DESYREL) 50 MG tablet Take 1 tablet (50 mg) by mouth At Bedtime     vitamin B complex with vitamin C (STRESS TAB) tablet Take 1 tablet by mouth daily     vitamin B1 (THIAMINE) 100 MG tablet Take 1 tablet (100 mg) by mouth daily     order for DME Equipment being ordered: Compression Stockings TWO (2) Pairs, 15-20 mm Hg.     order for DME Equipment being ordered: Manual wheelchair.  Estimated duration- 3 months     pantoprazole (PROTONIX) 40 MG EC tablet Take 1 tablet (40 mg) by mouth daily       ALLERGIES:     Allergies   Allergen Reactions     Blood Transfusion Related (Informational Only)      Patient has a history of a clinically significant antibody against RBC antigens.  A delay in compatible RBCs may occur.     Famotidine Unknown and Other (See Comments)     Severe abdominal cramps     Cyclobenzaprine Hives     Methocarbamol Other (See Comments)     Made pt's \"face break out\"     Pepcid Cramps     Severe abdominal cramps     Vancomycin Other (See Comments)     hallucinations     Vfend Other (See Comments)     IV - cold sweats, Iron tablet makes him nauseated and stomach ached     Hydrocodone-Acetaminophen Rash       PERTINENT SYSTEMS REVIEW:    1. No - Do you have a history of heart attack, stroke, stent, bypass or surgery on an artery in the head, " neck, heart or legs?  2. No - Do you ever have any pain or discomfort in your chest?  3. No - Do you have a history of  Heart Failure?  4. No - Are you troubled by shortness of breath when walking: On the level, up a slight hill or at night?  5. No - Do you currently have a cold, bronchitis or other respiratory infection?  6. No - Do you have a cough, shortness of breath or wheezing?  7. No - Do you sometimes get pains in the calves of your legs when you walk?  8. No - Do you or anyone in your family have previous history of blood clots?  9. No - Do you or does anyone in your family have a serious bleeding problem such as prolonged bleeding following surgeries or cuts?  10. No - Have you ever had problems with anemia or been told to take iron pills?  11. No - Have you had any abnormal blood loss such as black, tarry or bloody stools, or abnormal vaginal bleeding?  12. No - Have you ever had a blood transfusion?  13. No - Have you or any of your relatives ever had problems with anesthesia?  14. No - Do you have sleep apnea, excessive snoring or daytime drowsiness?  15. No - Do you have any prosthetic heart valves?  16. Yes: knee replacement - Do you have prosthetic joints?    EXAMINATION:  Vitals were reviewed                     Vison:  See prior;  HEENT:  Cataract, otherwise unremarkable.  LUNGS:  Clear  CV:  Regular rate and rhythm without murmur  ABD:  Soft and nontender  NEURO:  Alert and nonfocal    IMPRESSION:  Patient cleared for ophthalmic surgery.  Low risk with monitored, light sedation.  I have assessed the patient's DVT risk, and no additional orders necessary.    PLAN:  Procedure(s):  cataract removal with implant., Left Eye      Adrian Oliveira MD

## 2019-07-01 NOTE — OP NOTE
CATARACT OPERATIVE NOTE    PATIENT: Abhijeet Mccauley  DATE OF SURGERY: 7/1/2019  PREOPERATIVE DIAGNOSIS:  Senile Nuclear Cataract, Left eye  POSTOPERATIVE DIAGNOSIS:  Senile Nuclear Cataract, Left eye  OPERATIVE PROCEDURE:  Phacoemulsification and placement of intraocular lens  SURGEON:  Adrian Oliveira MD  ANESTHESIA:  Topical / MAC  EBL:  None  SPECIMENS:  None  COMPLICATIONS:  None    PROCEDURE:  The patient was brought to the operating room at [facility].  The left eye was prepped and draped in the usual fashion for cataract surgery.  A wire lid speculum was inserted.  A super sharp blade was used to make a paracentesis at the 5 O'clock position.  The super sharp blade was used to make a partial thickness temporal groove, which was 3 mm in length.  0.8 mL of non-preserved epi-Shugarcaine was injected into the anterior chamber.  Viscoelastic was used to inflate the anterior chamber through a cannula.  A 2.5 mm microkeratome was used to make a temporal clear corneal incision in a two-plane fashion.  A cystotome needle and forceps were used to make a capsulorrhexis.  Hydrodissection and hydrodelineation were performed with Balance Salt Solution.  The lens was then phacoemulsified and removed without complications.  The cortical material was removed with bimanual irrigation and aspiration.  The capsular bag was filled with viscoelastic.  A posterior chamber intraocular lens, preselected and recorded, was folded and inserted into the capsular bag.  The viscoelastic was removed with the irrigation and aspiration tip.  Balanced Salt Solution with Vigamox, 150mg/0.1mL, was used to refill the anterior chamber.  The wounds were checked for water tightness and required no suture.  The wire lid speculum was removed.  The patient's left eye was cleaned and a drop of each post-operative drop was placed, followed by a mart shield.  The patient tolerated the procedure well, and there were no  complications.      Adrian Oliveira MD

## 2019-07-06 NOTE — PROGRESS NOTES
Subjective     Abhijeet Mccauley is a 58 year old male who presents to clinic today for the following health issues:  Chief Complaint   Patient presents with     Edema     x 2.5 weeks     Breathing Problem     3 weeks      Blood Draw     patient requesting amonia level checked     HPI     I saw Abhijeet last on May 31.  He was having ongoing hand numbness but had not yet scheduled the EMG, so I recommend he get this scheduled.  It has still not been done.  Gabapentin was not helping, so I suggested trying Lyrica if his insurance would cover this.  He has a history of cirrhosis; in May, his edema was improved after adding spironolactone at the prior visit.  Considering further up titration of diuretics if swelling is not controlled.    Today, he reports he has not been taking the spironolactone.  Unsure how long he has been off of this.  He is not using compression socks because they only go to the knees.      Bilateral leg swelling       Duration: 2.5 weeks     Description (location/character/radiation): bilateral leg swelling     Intensity:  severe    Accompanying signs and symptoms: as noted     History (similar episodes/previous evaluation): None    Precipitating or alleviating factors: none    Therapies tried and outcome: elevation does not help    NOT taking spironolactone       Breathing issue       Duration: 3 weeks     Description (location/character/radiation): PATIENT reports he feels shortness of breath all the time, and cannot get in a good breath     Intensity:  severe    Accompanying signs and symptoms: PATIENT reports he gasps for air.  Can hear some wheezing w/ inhalation.  Has orthopnea.  No cough.  Has not noticed any bleeding recently.     History (similar episodes/previous evaluation): None    Precipitating or alleviating factors: None    Therapies tried and outcome: None    He had a normal CXR in March and a CT in February that showed a ADONIS calcified granuloma but otherwise lungs were normal        Stress test in January 2019:    Impression  1.  Myocardial perfusion imaging using single isotope technique  demonstrated no evidence of ischemia or infarction.   2. Gated images demonstrated normal size left ventricle with normal  wall motion.  The left ventricular systolic function is normal with  ejection fraction 70%.    Smoking 4-5 cigs per day.  Had reaction to the patch.  Found the gum unpalatable.      Patient Active Problem List   Diagnosis     Essential hypertension     Acute myeloid leukemia in remission (H)     Sensorineural hearing loss, asymmetrical     Alcoholic cirrhosis of liver (H)     Acute alcoholic hepatitis     Coagulation defect (H)     Osteoarthrosis     Thrombocytopenia (H)     Universal ulcerative (chronic) colitis(556.6) (H)     CKD (chronic kidney disease) stage 3, GFR 30-59 ml/min (H)     Rosacea     Mild intermittent asthma without complication     Peptic ulcer disease     Uncomplicated alcohol dependence (H)     Tobacco dependence syndrome     Tubular adenoma of colon     Normocytic anemia     Leukocytosis with increased monocytes     Generalized weakness     COPD exacerbation (H)     AIDP (acute inflammatory demyelinating polyneuropathy) (H)     Past Surgical History:   Procedure Laterality Date     AS TOTAL KNEE ARTHROPLASTY Left      BONE MARROW BIOPSY, BONE SPECIMEN, NEEDLE/TROCAR N/A 6/12/2018    Procedure: BIOPSY BONE MARROW;  Bone Marrow Biopsy;  Surgeon: Demar Sapp MD;  Location: [facility]     ESOPHAGOSCOPY, GASTROSCOPY, DUODENOSCOPY (EGD), COMBINED N/A 2/8/2018    Procedure: COMBINED ESOPHAGOSCOPY, GASTROSCOPY, DUODENOSCOPY (EGD), BIOPSY SINGLE OR MULTIPLE;  gastroscopy with biopsies;  Surgeon: Raz Cobb MD;  Location: [facility]     ESOPHAGOSCOPY, GASTROSCOPY, DUODENOSCOPY (EGD), COMBINED N/A 3/18/2018    Procedure: COMBINED ESOPHAGOSCOPY, GASTROSCOPY, DUODENOSCOPY (EGD);;  Surgeon: Raz Cobb MD;  Location: [facility]     LACERATION REPAIR Right     Right  leg     PHACOEMULSIFICATION WITH STANDARD INTRAOCULAR LENS IMPLANT Left 7/1/2019    Procedure: cataract removal with implant.;  Surgeon: Adrian Oliveira MD;  Location: [facility]       Social History     Tobacco Use     Smoking status: Current Every Day Smoker     Packs/day: 0.50     Years: 30.00     Pack years: 15.00     Types: Cigarettes     Smokeless tobacco: Never Used     Tobacco comment: I strongly emphasized the importance of quitting smoking. 4-5 daily   Substance Use Topics     Alcohol use: Yes     Comment: Down to 3 beers per day.  Sometimes a cocktail also.     Family History   Problem Relation Age of Onset     Hypertension Mother      Coronary Artery Disease Father         MI         Current Outpatient Medications   Medication Sig Dispense Refill     albuterol (VENTOLIN HFA) 108 (90 Base) MCG/ACT inhaler Inhale 2 puffs into the lungs every 4 hours as needed for shortness of breath / dyspnea or wheezing 18 g 11     baclofen (LIORESAL) 10 MG tablet Take 0.5-1 tablets (5-10 mg) by mouth 3 times daily Take 1/2 to one tablet up to three times a day 90 tablet 3     buPROPion (WELLBUTRIN SR) 150 MG 12 hr tablet Take once per day for 3 days and then increase to twice per day 60 tablet 12     furosemide (LASIX) 20 MG tablet Take 1 tablet (20 mg) by mouth every morning 90 tablet 3     gabapentin (NEURONTIN) 300 MG capsule Take 2 capsules (600 mg) by mouth 3 times daily 180 capsule 11     loratadine (CLARITIN) 10 MG tablet Take 1 tablet (10 mg) by mouth daily as needed for allergies 30 tablet 6     order for DME Equipment being ordered: 2 pairs thigh high compression stockings, strength per patient preference 2 Units 1     pantoprazole (PROTONIX) 40 MG EC tablet Take 1 tablet (40 mg) by mouth daily 90 tablet 3     POTASSIUM CHLORIDE PO        pregabalin (LYRICA) 75 MG capsule Take 1 capsule (75 mg) by mouth 2 times daily 60 capsule 5     propranolol (INDERAL) 20 MG tablet Take 1 tablet (20 mg) by mouth 2 times  "daily 180 tablet 3     rifaximin (XIFAXAN) 550 MG TABS tablet Take 1 tablet (550 mg) by mouth 2 times daily 180 tablet 3     spironolactone (ALDACTONE) 50 MG tablet Take 1 tablet (50 mg) by mouth daily 90 tablet 3     traZODone (DESYREL) 50 MG tablet Take 1 tablet (50 mg) by mouth At Bedtime 90 tablet 3     vitamin B complex with vitamin C (STRESS TAB) tablet Take 1 tablet by mouth daily 90 tablet 3     cholestyramine (QUESTRAN) 4 g packet Take 1 packet (4 g) by mouth 2 times daily (with meals) 60 each 11     desonide (DESOWEN) 0.05 % external lotion Apply topically as needed 118 mL 1     mometasone-formoterol (DULERA) 200-5 MCG/ACT oral inhaler Inhale 2 puffs into the lungs 2 times daily        order for DME Equipment being ordered: Compression Stockings TWO (2) Pairs, 15-20 mm Hg. 2 Package prn     order for DME Equipment being ordered: Manual wheelchair.  Estimated duration- 3 months 1 Units 0     order for DME Equipment being ordered: bed pull up bar 1 Units 0     order for DME Equipment being ordered: Wheelchair 1 each 0     order for DME Equipment being ordered: shower chair 1 Device 0     order for DME Equipment being ordered: Toilet seat riser 1 Device 0     SODIUM BICARBONATE PO Take 650 mg by mouth daily as needed        tiotropium (SPIRIVA) 18 MCG capsule Inhale 1 capsule into the lungs daily Using PRN       vitamin B1 (THIAMINE) 100 MG tablet Take 1 tablet (100 mg) by mouth daily 90 tablet 3     Allergies   Allergen Reactions     Blood Transfusion Related (Informational Only)      Patient has a history of a clinically significant antibody against RBC antigens.  A delay in compatible RBCs may occur.     Famotidine Unknown and Other (See Comments)     Severe abdominal cramps     Cyclobenzaprine Hives     Methocarbamol Other (See Comments)     Made pt's \"face break out\"     Pepcid Cramps     Severe abdominal cramps     Vancomycin Other (See Comments)     hallucinations     Vfend Other (See Comments)     IV " - cold sweats, Iron tablet makes him nauseated and stomach ached     Hydrocodone-Acetaminophen Rash       Reviewed and updated as needed this visit by Provider         Review of Systems   ROS COMP: Constitutional, cardiovascular, pulmonary, gi systems are negative, except as otherwise noted.      Objective    /64 (BP Location: Left arm, Patient Position: Sitting, Cuff Size: Adult Regular)   Pulse 77   Temp 99.8  F (37.7  C) (Tympanic)   Resp 24   Wt 95.6 kg (210 lb 11.2 oz)   SpO2 98%   BMI 34.01 kg/m    Body mass index is 34.01 kg/m .  Physical Exam   GENERAL: alert and no distress  RESP: slight upper lung wheezes  CV: regular rate and rhythm, normal S1 S2, no S3 or S4, no murmur, click or rub, significant bilateral lower leg pitting edema    Diagnostic Test Results:  Labs reviewed in Epic    Contacted by lab about critically low Hgb of 5.7    Results for orders placed or performed in visit on 07/08/19 (from the past 24 hour(s))   Comprehensive metabolic panel   Result Value Ref Range    Sodium 141 133 - 144 mmol/L    Potassium 4.0 3.4 - 5.3 mmol/L    Chloride 113 (H) 94 - 109 mmol/L    Carbon Dioxide 20 20 - 32 mmol/L    Anion Gap 8 3 - 14 mmol/L    Glucose 76 70 - 99 mg/dL    Urea Nitrogen 20 7 - 30 mg/dL    Creatinine 1.92 (H) 0.66 - 1.25 mg/dL    GFR Estimate 37 (L) >60 mL/min/[1.73_m2]    GFR Estimate If Black 43 (L) >60 mL/min/[1.73_m2]    Calcium 8.9 8.5 - 10.1 mg/dL    Bilirubin Total 1.1 0.2 - 1.3 mg/dL    Albumin 3.1 (L) 3.4 - 5.0 g/dL    Protein Total 7.0 6.8 - 8.8 g/dL    Alkaline Phosphatase 280 (H) 40 - 150 U/L    ALT 15 0 - 70 U/L    AST 25 0 - 45 U/L           Assessment & Plan     1. SOB (shortness of breath)    Significant anemia likely contributory (see below) but he also has wheezing on exam and a smoking history so wonder about COPD.  Recommend PFTs.  He reports he has never had a problem with his lungs from smoking in the past, but I note that he has been prescribed Spiriva and  Dulera at outside facility.  He does not recall having PFTs done in the past.      - General PFT Lab (Please always keep checked); Future  - Pulmonary Function Test; Future  - CBC with platelets    2. Normocytic anemia    Abhijeet has a history of chronic anemia with multiple hospitlizations in 2018 and blood transfusions.  He's had trouble with rectal bleeding in the past.  Heena has a history of UC and AML successfully treated in 2008.  He has been advised to follow-up with GI, but has not done so.  He last saw hematology in July 2018 and bone marrow biopsy showed now recurrence of leukemia at that time.  EGD March 2018 was normal and colonoscopy then found only polyps.  He has CKD3-4 and nephrology consult has been recommended, but he has not seen them outpatient.      Hgb is down to 5.7 today.  He denies any recent bleeding.  Hasn't been taking iron supplement recently- this was causing stomach upset and he had reaction to IV iron.  Recommended blood transfusion of 2 units PRBCs to get his hemoglobin back over 7 and to help with symptoms.  Since this issue is chronic with no acute bleeding, transfusion can be set up sometime later this week in the infusion center.  He was called over the phone and verbally consented for blood transfusion.  Recheck hemoglobin in 1 week.  Recommended that he schedule follow-up with both hematology and GI for further investigation of this.  We discussed that kidney disease may also be contributing to his anemia, I suggested also seeing nephrology, but he seemed a bit overwhelmed at all of the recommended follow-up visits, so referral was not placed at this time.    - ABO/Rh type and screen    3. Peripheral edema    He also stopped wearing compression stockings since they did not cover the knees and he was having swelling in the knees, so prescription printed for thigh-high compression stockings.  Worsens since he stopped the spironolactone.  Recommended he resume this.    - order  for DME; Equipment being ordered: 2 pairs thigh high compression stockings, strength per patient preference  Dispense: 2 Units; Refill: 1  - Comprehensive metabolic panel  - spironolactone (ALDACTONE) 50 MG tablet; Take 1 tablet (50 mg) by mouth daily  Dispense: 90 tablet; Refill: 3    4. Tobacco abuse  5. Tobacco abuse counseling    He is potentially interested in trying medication for smoking cessation.  He has tried patches and gum in the past but did not tolerate these.  Discussed both Chantix and Wellbutrin and he wished to try the Wellbutrin.    - Tobacco Cessation - Order to Satisfy Health Maintenance  - buPROPion (WELLBUTRIN SR) 150 MG 12 hr tablet; Take once per day for 3 days and then increase to twice per day  Dispense: 60 tablet; Refill: 12      Return in about 2 weeks (around 7/22/2019) for for lung function testing.    Chidi Valenzuela MD  [facility]    Chart documentation was done using Dragon dictation software. Although reviewed after completion, some errors may remain.

## 2019-07-08 ENCOUNTER — OFFICE VISIT (OUTPATIENT)
Dept: FAMILY MEDICINE | Facility: CLINIC | Age: 59
End: 2019-07-08
Payer: COMMERCIAL

## 2019-07-08 VITALS
BODY MASS INDEX: 34.01 KG/M2 | HEART RATE: 77 BPM | SYSTOLIC BLOOD PRESSURE: 128 MMHG | WEIGHT: 210.7 LBS | OXYGEN SATURATION: 98 % | TEMPERATURE: 99.8 F | RESPIRATION RATE: 24 BRPM | DIASTOLIC BLOOD PRESSURE: 64 MMHG

## 2019-07-08 DIAGNOSIS — D64.9 NORMOCYTIC ANEMIA: ICD-10-CM

## 2019-07-08 DIAGNOSIS — Z71.6 TOBACCO ABUSE COUNSELING: ICD-10-CM

## 2019-07-08 DIAGNOSIS — R06.02 SOB (SHORTNESS OF BREATH): Primary | ICD-10-CM

## 2019-07-08 DIAGNOSIS — R60.0 PERIPHERAL EDEMA: ICD-10-CM

## 2019-07-08 DIAGNOSIS — Z72.0 TOBACCO ABUSE: ICD-10-CM

## 2019-07-08 LAB
ABO + RH BLD: NORMAL
ABO + RH BLD: NORMAL
ALBUMIN SERPL-MCNC: 3.1 G/DL (ref 3.4–5)
ALP SERPL-CCNC: 280 U/L (ref 40–150)
ALT SERPL W P-5'-P-CCNC: 15 U/L (ref 0–70)
ANION GAP SERPL CALCULATED.3IONS-SCNC: 8 MMOL/L (ref 3–14)
AST SERPL W P-5'-P-CCNC: 25 U/L (ref 0–45)
BILIRUB SERPL-MCNC: 1.1 MG/DL (ref 0.2–1.3)
BLD GP AB SCN SERPL QL: NORMAL
BLOOD BANK CMNT PATIENT-IMP: NORMAL
BUN SERPL-MCNC: 20 MG/DL (ref 7–30)
CALCIUM SERPL-MCNC: 8.9 MG/DL (ref 8.5–10.1)
CHLORIDE SERPL-SCNC: 113 MMOL/L (ref 94–109)
CO2 SERPL-SCNC: 20 MMOL/L (ref 20–32)
CREAT SERPL-MCNC: 1.92 MG/DL (ref 0.66–1.25)
ERYTHROCYTE [DISTWIDTH] IN BLOOD BY AUTOMATED COUNT: 20.1 % (ref 10–15)
GFR SERPL CREATININE-BSD FRML MDRD: 37 ML/MIN/{1.73_M2}
GLUCOSE SERPL-MCNC: 76 MG/DL (ref 70–99)
HCT VFR BLD AUTO: 21.1 % (ref 40–53)
HGB BLD-MCNC: 5.7 G/DL (ref 13.3–17.7)
MCH RBC QN AUTO: 22.3 PG (ref 26.5–33)
MCHC RBC AUTO-ENTMCNC: 27 G/DL (ref 31.5–36.5)
MCV RBC AUTO: 82 FL (ref 78–100)
PLATELET # BLD AUTO: 121 10E9/L (ref 150–450)
POTASSIUM SERPL-SCNC: 4 MMOL/L (ref 3.4–5.3)
PROT SERPL-MCNC: 7 G/DL (ref 6.8–8.8)
RBC # BLD AUTO: 2.56 10E12/L (ref 4.4–5.9)
SODIUM SERPL-SCNC: 141 MMOL/L (ref 133–144)
SPECIMEN EXP DATE BLD: NORMAL
WBC # BLD AUTO: 8.6 10E9/L (ref 4–11)

## 2019-07-08 PROCEDURE — 80053 COMPREHEN METABOLIC PANEL: CPT | Performed by: INTERNAL MEDICINE

## 2019-07-08 PROCEDURE — 86850 RBC ANTIBODY SCREEN: CPT | Performed by: INTERNAL MEDICINE

## 2019-07-08 PROCEDURE — 36415 COLL VENOUS BLD VENIPUNCTURE: CPT | Performed by: INTERNAL MEDICINE

## 2019-07-08 PROCEDURE — 86901 BLOOD TYPING SEROLOGIC RH(D): CPT | Performed by: INTERNAL MEDICINE

## 2019-07-08 PROCEDURE — 86900 BLOOD TYPING SEROLOGIC ABO: CPT | Performed by: INTERNAL MEDICINE

## 2019-07-08 PROCEDURE — 85027 COMPLETE CBC AUTOMATED: CPT | Performed by: INTERNAL MEDICINE

## 2019-07-08 PROCEDURE — 99214 OFFICE O/P EST MOD 30 MIN: CPT | Performed by: INTERNAL MEDICINE

## 2019-07-08 RX ORDER — BUPROPION HYDROCHLORIDE 150 MG/1
TABLET, EXTENDED RELEASE ORAL
Qty: 60 TABLET | Refills: 12 | Status: ON HOLD | OUTPATIENT
Start: 2019-07-08 | End: 2020-06-16

## 2019-07-08 RX ORDER — SPIRONOLACTONE 50 MG/1
50 TABLET, FILM COATED ORAL DAILY
Qty: 90 TABLET | Refills: 3 | Status: SHIPPED | OUTPATIENT
Start: 2019-07-08 | End: 2019-08-28

## 2019-07-08 NOTE — LETTER
[facility] [place]  [address] [facility] [address]  Phone: [phone]    07/08/19    Abhijeet Mccauley  [address] [place] [id number]      Abhijeet,    Your hemoglobin was 5.7 as we discussed on the phone.  Please have your blood rechecked 1 week after you have the transfusion.  Platelet counts were also slightly low, likely due to the liver disease.  Your albumin protein level is low, likely due to your liver disease as well, and this can worsen swelling.  Your kidney function is reduced but similar to before, in the stage 3 kidney disease range.  I would recommend seeing a kidney specialist at some point in the near future- let me know when you are ready for me to place this referral.    Please call with any questions or concerns.    Sincerely,      Chidi Valenzuela MD

## 2019-07-08 NOTE — PATIENT INSTRUCTIONS
The leg swelling is probably a combination of your kidney and liver problems.     Restart the spironolactone this is a water pill and will help get the swelling off.  When you were taking it in May, your swelling was better.      Please call [phone] to set up the nerve study.      Quitting smoking is recommended to improve your lung health.      I have ordered a lung function test (PFT).  Please call [phone] to schedule this.        Zyban / Wellbutrin    Dosing:    Before Your Quit Date: Dose   Days 1-3 Take 150 mg by mouth once daily in the morning.   Days 4-7 (or up to 4-14 days) Take 150 mg by mouth twice daily in the morning and evening.   After Your Quit Date:    Treatment usually continues 7-12 weeks Take 150 mg by mouth twice daily in the morning and evening.       Some tips:    You will start taking bupropion at least 1 week before quitting smoking. It is okay to smoke while using bupropion.     You can use nicotine replacement therapy such as nicotine gum while using  Bupropion.     Take your 2 daily doses at least 8 hours apart.     DO NOT CRUSH OR BREAK TABLETS. Swallow the tablet whole.     You can take your medication with or without food.     Do not drink alcohol while taking this medication.     Side Effects:    Some people may experience dry mouth and insomnia. These may resolve in time.     Small frequent meals, frequent mouth care, chewing gum, or sucking lozenges may help with dry mouth.     Other possible rare side effects include constipation, nausea, headache, drowsiness, and weight loss may occur.     If you experience any other side effects that are troublesome, or if you feel something is not right, call your health care professional.         HOW TO QUIT SMOKING  Smoking is one of the hardest habits to break. About half of all those who have ever smoked have been able to quit, and most of those (about 70%) who still smoke want to quit. Here are some of the best ways to stop  smoking.     KEEP TRYING:  It takes most smokers about 8 tries before they are finally able to fully quit. So, the more often you try and fail, the better your chance of quitting the next time! So, don't give up!    GO COLD TURKEY:  Most ex-smokers quit cold turkey. Trying to cut back gradually doesn't seem to work as well, perhaps because it continues the smoking habit. Also, it is possible to fool yourself by inhaling more while smoking fewer cigarettes. This results in the same amount of nicotine in your body!    GET SUPPORT:  Support programs can make an important difference, especially for the heavy smoker. These groups offer lectures, methods to change your behavior and peer support. Call the free national Quitline for more information. 800-QUIT-NOW (239-305-9750). Low-cost or free programs are offered by many hospitals, local chapters of the American Lung Association (936-047-2703) and the American Cancer Society (618-761-4458). Support at home is important too. Non-smokers can help by offering praise and encouragement. If the smoker fails to quit, encourage them to try again!    OVER-THE-COUNTER MEDICINES:  For those who can't quit on their own, Nicotine Replacement Therapy (NRT) may make quitting much easier. Certain aids such as the nicotine patch, gum and lozenge are available without a prescription. However, it is best to use these under the guidance of your doctor. The skin patch provides a steady supply of nicotine to the body. Nicotine gum and lozenge gives temporary bursts of low levels of nicotine. Both methods take the edge off the craving for cigarettes. WARNING: If you feel symptoms of nicotine overdose, such as nausea, vomiting, dizziness, weakness, or fast heartbeat, stop using these and see your doctor.    PRESCRIPTION MEDICINES:  After evaluating your smoking patterns and prior attempts at quitting, your doctor may offer a prescription medicine such as bupropion (Zyban, Wellbutrin),  varenicline (Chantix, Champix), a niocotine inhaler or nasal spray. Each has its unique advantage and side effects which your doctor can review with you.    HEALTH BENEFITS OF QUITTING:  The benefits of quitting start right away and keep improving the longer you go without smokin minutes: blood pressure and pulse return to normal  8 hours: oxygen levels return to normal  2 days: ability to smell and taste begins to improve as damaged nerves start to regrow  2-3 weeks: circulation and lung function improves  1-9 months: decreased cough, congestion and shortness of breath; less tired  1 year: risk of heart attack decreases by half  5 years: risk of lung cancer decreases by half; risk of stroke becomes the same as a non-smoker  For information about how to quit smoking, visit the following links:  National [employer] ,   Clearing the Air, Quit Smoking Today   - an online booklet. http://www.smokefree.gov/pubs/clearing_the_air.pdf  Smokefree.gov http://smokefree.gov/  QuitNet http://www.quitnet.com/    7896-7903 Nidia Phillips, [address] [place] [address]. All rights reserved. This information is not intended as a substitute for professional medical care. Always follow your healthcare professional's instructions.    The Benefits of Living Smoke Free  What do you want to gain from quitting? Check off some reasons to quit.  Health Benefits  ___ Reduce my risk of lung cancer, heart disease, chronic lung disease  ___ Have fewer wrinkles and softer skin  ___ Improve my sense of taste and smell  ___ For pregnant women--reduce the risk of having a miscarriage, stillbirth, premature birth, or low-birth-weight baby  Personal Benefits  ___ Feel more in control of my life  ___ Have better-smelling hair, breath, clothes, home, and car  ___ Save time by not having to take smoke breaks, buy cigarettes, or hunt for a light  ___ Have whiter teeth  Family Benefits  ___ Reduce my children s respiratory tract  infections  ___ Set a good example for my children  ___ Reduce my family s cancer risk  Financial Benefits  ___ Save hundreds of dollars each year that would be spent on cigarettes  ___ Save money on medical bills  ___ Save on life, health, and car insurance premiums    Those Dollars Add Up!  Cigarettes are expensive, and getting more expensive all the time. Do you realize how much money you are spending on cigarettes per year? What is the average amount you spend on a pack of cigarettes? What is the average number of packs that you smoke per day? Using your answers to these questions, fill in this formula to help you find out:  ($ _____ per pack) ×  ( _____ number of packs per day) × (365 days) =  $ _____ yearly cost of smoking  Besides tobacco, there are other costs, including extra cleaning bills and replacement costs for clothing and furniture; medical expenses for smoking-related illnesses; and higher health, life, and car insurance premiums.    Cigars and Pipes Count Too!  Cigars and pipes are also dangerous. So are smokeless (chewing) tobacco and snuff. All of these products contain nicotine, a highly addictive substance that has harmful effects on your body. Quitting smoking means giving up all tobacco products.      6428-3856 [facility], [address] [place] [address], [place] [address]. All rights reserved. This information is not intended as a substitute for professional medical care. Always follow your healthcare professional's instructions.

## 2019-07-11 ENCOUNTER — HOSPITAL ENCOUNTER (OUTPATIENT)
Dept: LAB | Facility: CLINIC | Age: 59
Discharge: HOME | End: 2019-07-11
Attending: INTERNAL MEDICINE | Admitting: INTERNAL MEDICINE
Payer: COMMERCIAL

## 2019-07-11 ENCOUNTER — PATIENT OUTREACH (OUTPATIENT)
Dept: CARE COORDINATION | Facility: CLINIC | Age: 59
End: 2019-07-11

## 2019-07-11 ENCOUNTER — INFUSION THERAPY VISIT (OUTPATIENT)
Dept: INFUSION THERAPY | Facility: CLINIC | Age: 59
End: 2019-07-11
Attending: INTERNAL MEDICINE
Payer: COMMERCIAL

## 2019-07-11 VITALS
RESPIRATION RATE: 16 BRPM | OXYGEN SATURATION: 95 % | SYSTOLIC BLOOD PRESSURE: 118 MMHG | DIASTOLIC BLOOD PRESSURE: 41 MMHG | HEART RATE: 69 BPM | TEMPERATURE: 98.1 F

## 2019-07-11 DIAGNOSIS — D64.9 NORMOCYTIC ANEMIA: Primary | ICD-10-CM

## 2019-07-11 LAB
ABO + RH BLD: NORMAL
ABO + RH BLD: NORMAL
BLD GP AB SCN SERPL QL: NORMAL
BLD PROD TYP BPU: NORMAL
BLD UNIT ID BPU: 0
BLD UNIT ID BPU: 0
BLOOD BANK CMNT PATIENT-IMP: NORMAL
BLOOD PRODUCT CODE: NORMAL
BLOOD PRODUCT CODE: NORMAL
BPU ID: NORMAL
BPU ID: NORMAL
NUM BPU REQUESTED: 2
SPECIMEN EXP DATE BLD: NORMAL
TRANSFUSION STATUS PATIENT QL: NORMAL

## 2019-07-11 PROCEDURE — 86901 BLOOD TYPING SEROLOGIC RH(D): CPT | Performed by: INTERNAL MEDICINE

## 2019-07-11 PROCEDURE — 86850 RBC ANTIBODY SCREEN: CPT | Performed by: INTERNAL MEDICINE

## 2019-07-11 PROCEDURE — 36415 COLL VENOUS BLD VENIPUNCTURE: CPT | Performed by: INTERNAL MEDICINE

## 2019-07-11 PROCEDURE — 86900 BLOOD TYPING SEROLOGIC ABO: CPT | Performed by: INTERNAL MEDICINE

## 2019-07-11 PROCEDURE — P9016 RBC LEUKOCYTES REDUCED: HCPCS

## 2019-07-11 PROCEDURE — 86922 COMPATIBILITY TEST ANTIGLOB: CPT | Performed by: INTERNAL MEDICINE

## 2019-07-11 PROCEDURE — 36430 TRANSFUSION BLD/BLD COMPNT: CPT

## 2019-07-11 ASSESSMENT — ACTIVITIES OF DAILY LIVING (ADL): DEPENDENT_IADLS:: TRANSPORTATION

## 2019-07-11 NOTE — PROGRESS NOTES
Infusion Nursing Note:  Abhijeet Mccauley presents today for 2UPRBC.    Patient seen by provider today: No   present during visit today: Not Applicable.    Note:.    Intravenous Access:  Peripheral IV placed.    Treatment Conditions:  Not Applicable.      Post Infusion Assessment:  Patient tolerated tranfusion without incident.  Blood return noted pre and post infusion.  Site patent and intact, free from redness, edema or discomfort.  No evidence of extravasations.  Access discontinued per protocol.   LS clear with slight inspiratory wheeze bilaterally before and after transfusion.    Discharge Plan:   Patient discharged in stable condition accompanied by: attendant.    Breana Stock RN

## 2019-07-11 NOTE — PROGRESS NOTES
Clinic Care Coordination Contact  Follow Up Progress Note      Assessment: SW placed call to pt today.  He informed this writer that he is currently sitting in the infusion center & doesn't have a lot of time to speak.      SW asked if this writer can call him next week as per my EMR review, pt has not made several medical appointments that his PCP has recommended.  Pt agrees to work with this writer to make appointments.     Goals:   Goals        General    Psychosocial (pt-stated)     Notes - Note created  6/3/2019 11:43 AM by Leonor Han LSW    Goal Statement: I will attend my medical appointments as recommended  Measure of Success: Pt will be aware of why he is scheduled & will attend medical appointments  Supportive Steps to Achieve: SWCC to assist pt with understanding of his medical cares & reasons for appointments  Barriers: Pt stated he has limited transportation & ability to attend appointments  Strengths: Pt wishes to remain in his own home, motivated to remain healthy  Date to Achieve By: October 1, 2019  Patient expressed understanding of goal: Yes                    Goal Progression: As of today's date 7/11/2019 goal is met at 51 - 75%.   Goal Status:  Ongoing      Intervention/Education provided during outreach: SW discussed the importance of medication adherence & scheduling of recommended appointments.     Outreach Frequency: 1 week      Plan:   SW will call pt next week & will assist him with scheduling appointments.   Care Coordinator will follow up in less than a week.    Leonor Ortiz  Social Work Care Coordinator  [facility]  [phone]

## 2019-07-12 ENCOUNTER — NURSE TRIAGE (OUTPATIENT)
Dept: FAMILY MEDICINE | Facility: CLINIC | Age: 59
End: 2019-07-12

## 2019-07-12 NOTE — TELEPHONE ENCOUNTER
Additional Information    Negative: Sounds like a life-threatening emergency to the triager    Negative: Diarrhea is the main symptom    Negative: Constipation is the main symptom (e.g., pain or discomfort caused by passage of hard BMs)    Blood in or on bowel movement is the main symptom    Negative: Passed out (i.e., fainted, collapsed and was not responding)    Negative: Shock suspected (e.g., cold/pale/clammy skin, too weak to stand, low BP, rapid pulse)    Negative: Vomiting red blood or black (coffee ground) material    Negative: Sounds like a life-threatening emergency to the triager    Negative: Diarrhea is the main symptom    Negative: Rectal symptoms    Negative: SEVERE rectal bleeding (large blood clots; on and off, or constant bleeding)    Negative: SEVERE dizziness (e.g., unable to stand, requires support to walk, feels like passing out now)    Negative: MODERATE rectal bleeding (small blood clots, passing blood without stool, or toilet water turns red) more than once a day    Negative: Bloody, black, or tarry bowel movements    Negative: High-risk adult (e.g., prior surgery on aorta, abdominal aortic aneurysm)    Negative: Rectal foreign body (inserted or swallowed)    Negative: SEVERE abdominal pain (e.g., excruciating)    Negative: Constant abdominal pain lasting > 2 hours    Negative: Pale skin (pallor) of new onset or worsening     Every time this RN states patient should go to the ER patient states he is not going.    Negative: Patient sounds very sick or weak to the triager    Negative: MODERATE rectal bleeding (small blood clots, passing blood without stool, or toilet water turns red)    Negative: Taking Coumadin (warfarin) or other strong blood thinner, or known bleeding disorder (e.g., thrombocytopenia)    Negative: Colonoscopy in past 72 hours    Negative: Known cirrhosis of the liver (or history of liver failure or ascites)    Negative: Patient wants to be seen    Negative: MILD rectal  "bleeding (more than just a few drops or streaks)    Negative: Cancer of rectum or intestines (colon)    Negative: Radiation therapy to lower abdomen or pelvis    Negative: Normal formed BM with a few streaks or drops of blood on surface of BM    Negative: Rectal bleeding is minimal (e.g., blood just on toilet paper, a few drops in toilet bowl)    Answer Assessment - Initial Assessment Questions  1. SYMPTOM:  \"What's the main symptom you're concerned about?\" (e.g., pain, itching, swelling, rash)      Rectal bleeding  2. ONSET: \"When did the   start?\"      More than usual but this has been ongoing  3. RECTAL PAIN: \"Do you have any pain around your rectum?\" \"How bad is the pain?\"  (Scale 1-10; or mild, moderate, severe)   - MILD (1-3): doesn't interfere with normal activities    - MODERATE (4-7): interferes with normal activities or awakens from sleep, limping    - SEVERE (8-10): excruciating pain, unable to have a bowel movement       denies  4. RECTAL ITCHING: \"Do you have any itching in this area?\" \"How bad is the itching?\"  (Scale 1-10; or mild, moderate, severe)   - MILD - doesn't interfere with normal activities    - MODERATE-SEVERE: interferes with normal activities or awakens from sleep      denied  5. CONSTIPATION: \"Do you have constipation?\" If so, \"How bad is it?\"      denies  6. CAUSE: \"What do you think is causing the anus symptoms?\"      Had a BM and noted more blood than usual.    7. OTHER SYMPTOMS: \"Do you have any other symptoms?\"  (e.g., rectal bleeding, abdominal pain, vomiting, fever)      Denies abd pain, fever.  Had 2 units of blood given yesterday  8. PREGNANCY: \"Is there any chance you are pregnant?\" \"When was your last menstrual period?\"      male    Answer Assessment - Initial Assessment Questions  1. APPEARANCE of BLOOD: \"What color is it?\" \"Is it passed separately, on the surface of the stool, or mixed in with the stool?\"       Bright red  2. AMOUNT: \"How much blood was passed?\"       Quite " "a bit per patient.  More than usual  3. FREQUENCY: \"How many times has blood been passed with the stools?\"       Only happened today but he has had ongoing   4. ONSET: \"When was the blood first seen in the stools?\" (Days or weeks)       ongoing  5. DIARRHEA: \"Is there also some diarrhea?\" If so, ask: \"How many diarrhea stools were passed in past 24 hours?\"       denies  6. CONSTIPATION: \"Do you have constipation?\" If so, \"How bad is it?\"      denies  7. RECURRENT SYMPTOMS: \"Have you had blood in your stools before?\" If so, ask: \"When was the last time?\" and \"What happened that time?\"       Ongoing referred by Dr. Valenzuela to go to GI but refusing.  Had 2 units of blood given yesterday  8. BLOOD THINNERS: \"Do you take any blood thinners?\" (e.g., Coumadin/warfarin, Pradaxa/dabigatran, aspirin)      none  9. OTHER SYMPTOMS: \"Do you have any other symptoms?\"  (e.g., abdominal pain, vomiting, dizziness, fever)      Denies all of this  10. PREGNANCY: \"Is there any chance you are pregnant?\" \"When was your last menstrual period?\"        Male patient    Protocols used: RECTAL SYMPTOMS-A-OH, RECTAL BLEEDING-A-OH    "

## 2019-07-12 NOTE — TELEPHONE ENCOUNTER
"S-(situation): rectal bleeding    B-(background): ongoing problem with some rectal bleeding and normocytic anemia.  Referred to GI but not willing to do this either.  Had 2 units of PRBC given yesterday.  Had a BM today and noted \"more blood than usual\" in the stool.  Denies Abd pain, constipation, diarrhea, fever.  When asked about SOA patient states not any more than usual.    A-(assessment): rectal bleeding    R-(recommendations):Per triage guidelines referred to the ER.  Patient refusing to go.  Please advise.  Catherine WANG RN    "

## 2019-07-12 NOTE — TELEPHONE ENCOUNTER
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 Unfortunately the best plan is ER. His hemoglobin level was very low on 7/8 and he received 2 units of blood.        LOPEZ Ramey CNP

## 2019-07-12 NOTE — TELEPHONE ENCOUNTER
Pt refused ED.  Appt made for Tuesday, 7/16, per pt's request.   Pt agrees to return to ED if worsens in the meantime.  Zulema Gramajo RN

## 2019-07-15 ENCOUNTER — NURSE TRIAGE (OUTPATIENT)
Dept: NURSING | Facility: CLINIC | Age: 59
End: 2019-07-15

## 2019-07-15 NOTE — PROGRESS NOTES
Subjective     Abhijeet Mccauley is a 58 year old male who presents to clinic today for the following health issues:  Chief Complaint   Patient presents with     RECHECK     hemoglobin      Fall     fall last night w/ injury to forearm, hit arm on carpet and got a rug burn on right forearm     Medication Reconciliation     patient would not go through medications        HPI     I saw Abhijeet on July 8 with shortness of breath.  He was found to have significant anemia with hemoglobin down to 5.7.  He received a blood transfusion of 2 units on July 11.  He has a history of GI bleeding I recommended that he follow-up with both GI and hematology- these are not yet scheduled.  We also discussed seeing nephrology due to his CKD to see if this may also be contributing to anemia but referral was deferred at this point since he seemed overwhelmed with all the recommended follow-up visits.    He called on July 12 having noted increased blood in the stool and it was recommended to go to the ER, but he refused.  He also called on July 14 reporting complete loss of vision of the right eye.  ED visit was also recommended for this but he did not go.  He had left cataract surgery done earlier this month.  Right-sided surgery is planned toward the end of the month.    He also has a smoking history so I wondered if COPD is contributing to his shortness of breath and recommended doing PFTs.  This is not yet scheduled.      Edema had worsened off spironolactone, so I recommended resuming this.  He is on this now and feels the edema and leg pain have improved.  He got the thigh high compression stockings and has been using these.      He fell yesterday and has an abrasion in the right forearm.  Slipped on the floor after coming in from the rain.      He reports his SOB is much improved since the transfusion.        Patient Active Problem List   Diagnosis     Essential hypertension     Acute myeloid leukemia in remission (H)      Sensorineural hearing loss, asymmetrical     Alcoholic cirrhosis of liver (H)     Acute alcoholic hepatitis     Coagulation defect (H)     Osteoarthrosis     Thrombocytopenia (H)     Universal ulcerative (chronic) colitis(556.6) (H)     CKD (chronic kidney disease) stage 3, GFR 30-59 ml/min (H)     Rosacea     Mild intermittent asthma without complication     Peptic ulcer disease     Uncomplicated alcohol dependence (H)     Tobacco dependence syndrome     Tubular adenoma of colon     Normocytic anemia     Leukocytosis with increased monocytes     Generalized weakness     COPD exacerbation (H)     AIDP (acute inflammatory demyelinating polyneuropathy) (H)     Past Surgical History:   Procedure Laterality Date     AS TOTAL KNEE ARTHROPLASTY Left      BONE MARROW BIOPSY, BONE SPECIMEN, NEEDLE/TROCAR N/A 6/12/2018    Procedure: BIOPSY BONE MARROW;  Bone Marrow Biopsy;  Surgeon: Demar Sapp MD;  Location: [facility]     ESOPHAGOSCOPY, GASTROSCOPY, DUODENOSCOPY (EGD), COMBINED N/A 2/8/2018    Procedure: COMBINED ESOPHAGOSCOPY, GASTROSCOPY, DUODENOSCOPY (EGD), BIOPSY SINGLE OR MULTIPLE;  gastroscopy with biopsies;  Surgeon: Raz Cobb MD;  Location: [facility]     ESOPHAGOSCOPY, GASTROSCOPY, DUODENOSCOPY (EGD), COMBINED N/A 3/18/2018    Procedure: COMBINED ESOPHAGOSCOPY, GASTROSCOPY, DUODENOSCOPY (EGD);;  Surgeon: Raz Cobb MD;  Location: [facility]     LACERATION REPAIR Right     Right leg     PHACOEMULSIFICATION WITH STANDARD INTRAOCULAR LENS IMPLANT Left 7/1/2019    Procedure: cataract removal with implant.;  Surgeon: Adrian Oliveira MD;  Location: [facility]       Social History     Tobacco Use     Smoking status: Current Every Day Smoker     Packs/day: 0.50     Years: 30.00     Pack years: 15.00     Types: Cigarettes     Smokeless tobacco: Never Used     Tobacco comment: I strongly emphasized the importance of quitting smoking. 4-5 daily   Substance Use Topics     Alcohol use: Yes     Comment: Down to 3  beers per day.  Sometimes a cocktail also.     Family History   Problem Relation Age of Onset     Hypertension Mother      Coronary Artery Disease Father         MI         Current Outpatient Medications   Medication Sig Dispense Refill     albuterol (VENTOLIN HFA) 108 (90 Base) MCG/ACT inhaler Inhale 2 puffs into the lungs every 4 hours as needed for shortness of breath / dyspnea or wheezing 18 g 11     baclofen (LIORESAL) 10 MG tablet Take 0.5-1 tablets (5-10 mg) by mouth 3 times daily Take 1/2 to one tablet up to three times a day 90 tablet 3     buPROPion (WELLBUTRIN SR) 150 MG 12 hr tablet Take once per day for 3 days and then increase to twice per day 60 tablet 12     cholestyramine (QUESTRAN) 4 g packet Take 1 packet (4 g) by mouth 2 times daily (with meals) 60 each 11     desonide (DESOWEN) 0.05 % external lotion Apply topically as needed 118 mL 1     furosemide (LASIX) 20 MG tablet Take 1 tablet (20 mg) by mouth every morning 90 tablet 3     gabapentin (NEURONTIN) 300 MG capsule Take 2 capsules (600 mg) by mouth 3 times daily 180 capsule 11     loratadine (CLARITIN) 10 MG tablet Take 1 tablet (10 mg) by mouth daily as needed for allergies 30 tablet 6     mometasone-formoterol (DULERA) 200-5 MCG/ACT oral inhaler Inhale 2 puffs into the lungs 2 times daily        order for DME Equipment being ordered: 2 pairs thigh high compression stockings, strength per patient preference 2 Units 1     order for DME Equipment being ordered: Compression Stockings TWO (2) Pairs, 15-20 mm Hg. 2 Package prn     order for DME Equipment being ordered: Manual wheelchair.  Estimated duration- 3 months 1 Units 0     order for DME Equipment being ordered: bed pull up bar 1 Units 0     order for DME Equipment being ordered: Wheelchair 1 each 0     order for DME Equipment being ordered: shower chair 1 Device 0     order for DME Equipment being ordered: Toilet seat riser 1 Device 0     pantoprazole (PROTONIX) 40 MG EC tablet Take 1  "tablet (40 mg) by mouth daily 90 tablet 3     POTASSIUM CHLORIDE PO        pregabalin (LYRICA) 75 MG capsule Take 1 capsule (75 mg) by mouth 2 times daily 60 capsule 5     propranolol (INDERAL) 20 MG tablet Take 1 tablet (20 mg) by mouth 2 times daily 180 tablet 3     rifaximin (XIFAXAN) 550 MG TABS tablet Take 1 tablet (550 mg) by mouth 2 times daily 180 tablet 3     SODIUM BICARBONATE PO Take 650 mg by mouth daily as needed        spironolactone (ALDACTONE) 50 MG tablet Take 1 tablet (50 mg) by mouth daily 90 tablet 3     tiotropium (SPIRIVA) 18 MCG capsule Inhale 1 capsule into the lungs daily Using PRN       traZODone (DESYREL) 50 MG tablet Take 1 tablet (50 mg) by mouth At Bedtime 90 tablet 3     vitamin B complex with vitamin C (STRESS TAB) tablet Take 1 tablet by mouth daily 90 tablet 3     vitamin B1 (THIAMINE) 100 MG tablet Take 1 tablet (100 mg) by mouth daily 90 tablet 3     Allergies   Allergen Reactions     Blood Transfusion Related (Informational Only)      Patient has a history of a clinically significant antibody against RBC antigens.  A delay in compatible RBCs may occur.     Famotidine Unknown and Other (See Comments)     Severe abdominal cramps     Cyclobenzaprine Hives     Methocarbamol Other (See Comments)     Made pt's \"face break out\"     Pepcid Cramps     Severe abdominal cramps     Vancomycin Other (See Comments)     hallucinations     Vfend Other (See Comments)     IV - cold sweats, Iron tablet makes him nauseated and stomach ached     Hydrocodone-Acetaminophen Rash       Reviewed and updated as needed this visit by Provider         Review of Systems   ROS COMP: Constitutional, GI, derm systems are negative, except as otherwise noted.      Objective    /66 (BP Location: Left arm, Patient Position: Sitting, Cuff Size: Adult Regular)   Pulse 73   Temp 97.2  F (36.2  C) (Tympanic)   Resp 16   Wt 87.4 kg (192 lb 11.2 oz)   SpO2 96%   BMI 31.10 kg/m    Body mass index is 31.1 " kg/m .  Physical Exam   GENERAL: healthy, alert and no distress  RESP: lungs clear to auscultation - no rales, rhonchi or wheezes  CV: regular rate and rhythm, normal S1 S2, no S3 or S4, no murmur, click or rub, moderate bilateral lower leg edema slightly improved from prior  SKIN: superficial round abrasion on right forearm about 2cm in diameter with serosanguinous drainage    Diagnostic Test Results:  Labs reviewed in Epic  Results for orders placed or performed in visit on 07/16/19 (from the past 24 hour(s))   Basic metabolic panel   Result Value Ref Range    Sodium 145 (H) 133 - 144 mmol/L    Potassium 4.3 3.4 - 5.3 mmol/L    Chloride 113 (H) 94 - 109 mmol/L    Carbon Dioxide 23 20 - 32 mmol/L    Anion Gap 9 3 - 14 mmol/L    Glucose 91 70 - 99 mg/dL    Urea Nitrogen 22 7 - 30 mg/dL    Creatinine 2.13 (H) 0.66 - 1.25 mg/dL    GFR Estimate 33 (L) >60 mL/min/[1.73_m2]    GFR Estimate If Black 38 (L) >60 mL/min/[1.73_m2]    Calcium 8.5 8.5 - 10.1 mg/dL   **Hemoglobin FUTURE anytime   Result Value Ref Range    Hemoglobin 7.6 (L) 13.3 - 17.7 g/dL           Assessment & Plan     1. Normocytic anemia    Hemoglobin is improved to 7.6 after transfusion with 2 units of red blood cells.  He is feeling much better.  He did call in reporting increased rectal bleeding just few days ago in ER visit was recommended but he declined.  He states that bleeding has improved since then.  I reiterated the importance of following up with GI and provided him with the numbers to schedule this follow-up.  Also gave him the numbers to schedule follow-up with hematology as well.  Plan to recheck hemoglobin again in 1 month assuming symptoms remain stable.    - **Hemoglobin FUTURE anytime    2. Peripheral edema    He reports edema slightly improved since resuming the spironolactone.  He still has significant edema in his legs on exam.  We may want to consider titrating up his diuretics, but first I will give him some more time on the current  doses.  Follow-up in 1 month.    - Basic metabolic panel    3. Abrasion of right upper extremity, initial encounter    Abrasion on the right arm was cleaned with soap and water and dressed with bandage and bacitracin ointment.  Advised him to change bandaging daily.      Return in about 1 month (around 8/16/2019) for Routine Visit.    Chidi Valenzuela MD  [facility] -    Chart documentation was done using Dragon dictation software. Although reviewed after completion, some errors may remain.

## 2019-07-15 NOTE — TELEPHONE ENCOUNTER
"[facility] triage call : Adrian Oliveira MD/  Leonor Cavazos   Presenting problem :  Pt called.  Had left cataract removed 1 month ago . New Acute vision loss   In R eye  1 week ago .\"  I  can't see out of my eye at all.\"   Currently : no fever or injury  But no vision out of Right eye .  Hx of chronic numbness of both hands  For years .   Guideline used : vision loss or change A [place]   Disposition and recommendations : Have someone drive you to ED now but Pt stated I need to hang up and didn't relate if going into ED as recommended.   Caller verbalizes understanding and denies further questions and will call back if further symptoms to triage or questions  . Fiona Mccoy RN  - [employer] Nurse Advisor     Reason for Disposition    Complete loss of vision in 1 or both eyes    Additional Information    Negative: Weakness of the face, arm or leg on one side of the body    Negative: Followed getting substance in the eye    Negative: Foreign body or object is or was lodged in the eye    Negative: Followed an eye injury    Negative: Followed sun lamp or sun exposure (UV keratitis)    Negative: Yellow or green discharge (pus) in the eye    Negative: Pregnant    Protocols used: VISION LOSS OR CHANGE-A-OH      "

## 2019-07-16 ENCOUNTER — OFFICE VISIT (OUTPATIENT)
Dept: AUDIOLOGY | Facility: CLINIC | Age: 59
End: 2019-07-16
Payer: COMMERCIAL

## 2019-07-16 ENCOUNTER — OFFICE VISIT (OUTPATIENT)
Dept: FAMILY MEDICINE | Facility: CLINIC | Age: 59
End: 2019-07-16
Payer: COMMERCIAL

## 2019-07-16 VITALS
RESPIRATION RATE: 16 BRPM | HEART RATE: 73 BPM | DIASTOLIC BLOOD PRESSURE: 66 MMHG | OXYGEN SATURATION: 96 % | SYSTOLIC BLOOD PRESSURE: 130 MMHG | TEMPERATURE: 97.2 F | WEIGHT: 192.7 LBS | BODY MASS INDEX: 31.1 KG/M2

## 2019-07-16 DIAGNOSIS — H90.3 SENSORINEURAL HEARING LOSS, ASYMMETRICAL: Primary | ICD-10-CM

## 2019-07-16 DIAGNOSIS — D64.9 NORMOCYTIC ANEMIA: Primary | ICD-10-CM

## 2019-07-16 DIAGNOSIS — R60.0 PERIPHERAL EDEMA: ICD-10-CM

## 2019-07-16 DIAGNOSIS — S40.811A ABRASION OF RIGHT UPPER EXTREMITY, INITIAL ENCOUNTER: ICD-10-CM

## 2019-07-16 LAB
ANION GAP SERPL CALCULATED.3IONS-SCNC: 9 MMOL/L (ref 3–14)
BUN SERPL-MCNC: 22 MG/DL (ref 7–30)
CALCIUM SERPL-MCNC: 8.5 MG/DL (ref 8.5–10.1)
CHLORIDE SERPL-SCNC: 113 MMOL/L (ref 94–109)
CO2 SERPL-SCNC: 23 MMOL/L (ref 20–32)
CREAT SERPL-MCNC: 2.13 MG/DL (ref 0.66–1.25)
GFR SERPL CREATININE-BSD FRML MDRD: 33 ML/MIN/{1.73_M2}
GLUCOSE SERPL-MCNC: 91 MG/DL (ref 70–99)
HGB BLD-MCNC: 7.6 G/DL (ref 13.3–17.7)
POTASSIUM SERPL-SCNC: 4.3 MMOL/L (ref 3.4–5.3)
SODIUM SERPL-SCNC: 145 MMOL/L (ref 133–144)

## 2019-07-16 PROCEDURE — 36415 COLL VENOUS BLD VENIPUNCTURE: CPT | Performed by: INTERNAL MEDICINE

## 2019-07-16 PROCEDURE — 80048 BASIC METABOLIC PNL TOTAL CA: CPT | Performed by: INTERNAL MEDICINE

## 2019-07-16 PROCEDURE — 99207 ZZC NO CHARGE LOS: CPT | Performed by: AUDIOLOGIST

## 2019-07-16 PROCEDURE — 92593 HC HEARING AID CHECK, BINAURAL: CPT | Performed by: AUDIOLOGIST

## 2019-07-16 PROCEDURE — 85018 HEMOGLOBIN: CPT | Performed by: INTERNAL MEDICINE

## 2019-07-16 PROCEDURE — 99214 OFFICE O/P EST MOD 30 MIN: CPT | Performed by: INTERNAL MEDICINE

## 2019-07-16 NOTE — PATIENT INSTRUCTIONS
Keep the wound covered and use some sort of ointment (either antibiotic ointment or even just regular Vaseline) to help with healing- change this once per day- until healed.      GI doctor to look into the blood in the stool:  SHIRA johnson [facility] [place] [phone], [facility] [place], [facility]: [phone] and [facility] [phone]    Hematology blood doctors:  Your provider has referred you to: [facility]: Cancer Care/Hematology (All Cancer Related Services) - [place] [phone]   [email or web address]  [place] [phone]   [email or web address]  [place] [phone]   [email or web address]  [place] [phone]   [email or web address]  [place] [phone]   [email or web address]  [place] [phone]   [email or web address]

## 2019-07-25 ENCOUNTER — ANESTHESIA EVENT (OUTPATIENT)
Dept: SURGERY | Facility: CLINIC | Age: 59
End: 2019-07-25
Payer: COMMERCIAL

## 2019-07-25 ASSESSMENT — LIFESTYLE VARIABLES: TOBACCO_USE: 1

## 2019-07-25 ASSESSMENT — COPD QUESTIONNAIRES: COPD: 1

## 2019-07-25 NOTE — ANESTHESIA PREPROCEDURE EVALUATION
Anesthesia Pre-Procedure Evaluation    Patient: Abhijeet Mccauley   MRN: [id number] : 1960          Preoperative Diagnosis: Right cataract    Procedure(s):  Cataract removal with implant.    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)     s/p chemo, no bone marrow transplant     Chronic obstructive pulmonary disease 2018     COPD (chronic obstructive pulmonary disease) (H)      History of pulmonary embolus (PE)      Hypertension      PUD (peptic ulcer disease)      Tobacco dependence      Ulcerative colitis (H)      Past Surgical History:   Procedure Laterality Date     AS TOTAL KNEE ARTHROPLASTY Left      BONE MARROW BIOPSY, BONE SPECIMEN, NEEDLE/TROCAR N/A 2018    Procedure: BIOPSY BONE MARROW;  Bone Marrow Biopsy;  Surgeon: Demar Sapp MD;  Location: [facility]     ESOPHAGOSCOPY, GASTROSCOPY, DUODENOSCOPY (EGD), COMBINED N/A 2018    Procedure: COMBINED ESOPHAGOSCOPY, GASTROSCOPY, DUODENOSCOPY (EGD), BIOPSY SINGLE OR MULTIPLE;  gastroscopy with biopsies;  Surgeon: Raz Cobb MD;  Location: [facility]     ESOPHAGOSCOPY, GASTROSCOPY, DUODENOSCOPY (EGD), COMBINED N/A 3/18/2018    Procedure: COMBINED ESOPHAGOSCOPY, GASTROSCOPY, DUODENOSCOPY (EGD);;  Surgeon: Raz Cobb MD;  Location: [facility]     LACERATION REPAIR Right     Right leg     PHACOEMULSIFICATION WITH STANDARD INTRAOCULAR LENS IMPLANT Left 2019    Procedure: cataract removal with implant.;  Surgeon: Adrian Oliveira MD;  Location: [facility]       Anesthesia Evaluation     . Pt has had prior anesthetic. Type: General and MAC           ROS/MED HX    ENT/Pulmonary:     (+)other ENT- Hearing loss, tobacco use, Current use COPD, , . Other pulmonary disease Hx PE.    Neurologic:     (+)other neuro Acute inflammatory demyelinating polyneuropathy    Cardiovascular:     (+)  Dyslipidemia, hypertension----. : . . . :. . Previous cardiac testing date:results:date: results:ECG reviewed date:3/9/19 results:Sinus Rhythm   - Negative precordial T-waves.     WITHIN NORMAL LIMITS date: results:          METS/Exercise Tolerance:     Hematologic:     (+) Anemia, Other Hematologic Disorder-Thrombocytopenia      Musculoskeletal:   (+) arthritis,  -       GI/Hepatic:     (+) Inflammatory bowel disease, hepatitis type Alcoholic, liver disease, Other GI/Hepatic ETOH abuse; Cirrhosis; Peptic ulcer disease      Renal/Genitourinary:     (+) chronic renal disease (CKD stage 3),       Endo:         Psychiatric:         Infectious Disease:         Malignancy:   (+) Malignancy History of Lymphoma/Leukemia  Lymph CA Remission status post,         Other:                          Physical Exam  Normal systems: cardiovascular and dental    Airway   Mallampati: II  TM distance: >3 FB  Neck ROM: full    Dental     Cardiovascular       Pulmonary (+) wheezes               Lab Results   Component Value Date    WBC 8.6 07/08/2019    HGB 7.6 (L) 07/16/2019    HCT 21.1 (L) 07/08/2019     (L) 07/08/2019    CRP 33.8 (H) 03/15/2019    SED 73 (H) 03/15/2019     (H) 07/16/2019    POTASSIUM 4.3 07/16/2019    CHLORIDE 113 (H) 07/16/2019    CO2 23 07/16/2019    BUN 22 07/16/2019    CR 2.13 (H) 07/16/2019    GLC 91 07/16/2019    BEE 8.5 07/16/2019    PHOS 5.1 (H) 12/22/2008    MAG 2.0 03/05/2019    ALBUMIN 3.1 (L) 07/08/2019    PROTTOTAL 7.0 07/08/2019    ALT 15 07/08/2019    AST 25 07/08/2019    ALKPHOS 280 (H) 07/08/2019    BILITOTAL 1.1 07/08/2019    LIPASE 314 03/05/2019    STEW 34 05/31/2019    PTT 36 03/09/2019    INR 1.47 (H) 03/09/2019    FIBR 323 12/10/2014    TSH 0.94 12/05/2018       Preop Vitals  BP Readings from Last 3 Encounters:   07/16/19 130/66   07/11/19 118/41   07/08/19 128/64    Pulse Readings from Last 3 Encounters:   07/16/19 73   07/11/19 69   07/08/19 77      Resp Readings from Last 3  "Encounters:   07/16/19 16   07/11/19 16   07/08/19 24    SpO2 Readings from Last 3 Encounters:   07/16/19 96%   07/11/19 95%   07/08/19 98%      Temp Readings from Last 1 Encounters:   07/16/19 36.2  C (97.2  F) (Tympanic)    Ht Readings from Last 1 Encounters:   07/01/19 1.676 m (5' 6\")      Wt Readings from Last 1 Encounters:   07/16/19 87.4 kg (192 lb 11.2 oz)    Estimated body mass index is 31.1 kg/m  as calculated from the following:    Height as of 7/1/19: 1.676 m (5' 6\").    Weight as of 7/16/19: 87.4 kg (192 lb 11.2 oz).       Anesthesia Plan      History & Physical Review  History and physical reviewed and following examination; no interval change.    ASA Status:  3 .        Plan for MAC          Postoperative Care      Consents  Anesthetic plan, risks, benefits and alternatives discussed with:  Patient..                 Karly Montano, APRN CRNA  "

## 2019-07-25 NOTE — H&P
[facility]  TOTAL EYE CARE  [address]  [facility] [place] [address]  [phone]  Dept: [phone]    OPHTHALMOLOGY PRE-OPERATIVE  HISTORY AND PHYSICAL    DATE OF H/P:  2019    DATE OF SURGERY:  2019  PROCEDURE:  Procedure(s):  Cataract removal with implant., Right Eye  LENS IMPLANT:  ZCB00 +22.5  REFRACTIVE GOAL:  PL Sph  SURGEON:  Adrian Oliveira MD    ANESTHESIA:  TOPICAL / MAC    OR CASE REQUIREMENTS:    DEMOGRAPHICS:  Demographic Information on Abhijeet Mccauley:    Abhijeet Mccauley  Gender: male  : 1960  [address]  [place] [address]  [phone] (home)     Medical Record: [id number]  Social Security Number: [id number]  Pharmacy:    [facility] - [place] - [address]  [facility] [place] [address]  [employer]  [employer]  Primary Care Provider: Chidi Valenzuela    Parent's names are: MeekPili (mother) and Data Unavailable (father).    Insurance: Payor: [employer] / Plan: [employer] MA ONLY / Product Type: HMO /     OCULAR HISTORY:  Cataracts, s/p IOL left eye.  RPE dystrophy.  ERM    HISTORIES:  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)     s/p chemo, no bone marrow transplant     Chronic obstructive pulmonary disease 2018     COPD (chronic obstructive pulmonary disease) (H)      History of pulmonary embolus (PE)      Hypertension      PUD (peptic ulcer disease)      Tobacco dependence      Ulcerative colitis (H)        Past Surgical History:   Procedure Laterality Date     AS TOTAL KNEE ARTHROPLASTY Left      BONE MARROW BIOPSY, BONE SPECIMEN, NEEDLE/TROCAR N/A 2018    Procedure: BIOPSY BONE MARROW;  Bone Marrow Biopsy;  Surgeon: Demar Sapp MD;  Location: [facility]     ESOPHAGOSCOPY, GASTROSCOPY,  DUODENOSCOPY (EGD), COMBINED N/A 2/8/2018    Procedure: COMBINED ESOPHAGOSCOPY, GASTROSCOPY, DUODENOSCOPY (EGD), BIOPSY SINGLE OR MULTIPLE;  gastroscopy with biopsies;  Surgeon: Raz Cobb MD;  Location: [facility]     ESOPHAGOSCOPY, GASTROSCOPY, DUODENOSCOPY (EGD), COMBINED N/A 3/18/2018    Procedure: COMBINED ESOPHAGOSCOPY, GASTROSCOPY, DUODENOSCOPY (EGD);;  Surgeon: Raz Cobb MD;  Location: [facility]     LACERATION REPAIR Right     Right leg     PHACOEMULSIFICATION WITH STANDARD INTRAOCULAR LENS IMPLANT Left 7/1/2019    Procedure: cataract removal with implant.;  Surgeon: Adrian Oliveira MD;  Location: [facility]       Family History   Problem Relation Age of Onset     Hypertension Mother      Coronary Artery Disease Father         MI       Social History     Tobacco Use     Smoking status: Current Every Day Smoker     Packs/day: 0.50     Years: 30.00     Pack years: 15.00     Types: Cigarettes     Smokeless tobacco: Never Used     Tobacco comment: I strongly emphasized the importance of quitting smoking. 4-5 daily   Substance Use Topics     Alcohol use: Yes     Comment: Down to 3 beers per day.  Sometimes a cocktail also.       MEDICATIONS:  No current facility-administered medications for this encounter.      Current Outpatient Medications   Medication Sig     albuterol (VENTOLIN HFA) 108 (90 Base) MCG/ACT inhaler Inhale 2 puffs into the lungs every 4 hours as needed for shortness of breath / dyspnea or wheezing     baclofen (LIORESAL) 10 MG tablet Take 0.5-1 tablets (5-10 mg) by mouth 3 times daily Take 1/2 to one tablet up to three times a day     buPROPion (WELLBUTRIN SR) 150 MG 12 hr tablet Take once per day for 3 days and then increase to twice per day     cholestyramine (QUESTRAN) 4 g packet Take 1 packet (4 g) by mouth 2 times daily (with meals)     desonide (DESOWEN) 0.05 % external lotion Apply topically as needed     furosemide (LASIX) 20 MG tablet Take 1 tablet (20 mg) by mouth every morning      gabapentin (NEURONTIN) 300 MG capsule Take 2 capsules (600 mg) by mouth 3 times daily     loratadine (CLARITIN) 10 MG tablet Take 1 tablet (10 mg) by mouth daily as needed for allergies     mometasone-formoterol (DULERA) 200-5 MCG/ACT oral inhaler Inhale 2 puffs into the lungs 2 times daily      order for DME Equipment being ordered: 2 pairs thigh high compression stockings, strength per patient preference     order for DME Equipment being ordered: Compression Stockings TWO (2) Pairs, 15-20 mm Hg.     order for DME Equipment being ordered: Manual wheelchair.  Estimated duration- 3 months     order for DME Equipment being ordered: bed pull up bar     order for DME Equipment being ordered: Wheelchair     order for DME Equipment being ordered: shower chair     order for DME Equipment being ordered: Toilet seat riser     pantoprazole (PROTONIX) 40 MG EC tablet Take 1 tablet (40 mg) by mouth daily     POTASSIUM CHLORIDE PO      pregabalin (LYRICA) 75 MG capsule Take 1 capsule (75 mg) by mouth 2 times daily     propranolol (INDERAL) 20 MG tablet Take 1 tablet (20 mg) by mouth 2 times daily     rifaximin (XIFAXAN) 550 MG TABS tablet Take 1 tablet (550 mg) by mouth 2 times daily     SODIUM BICARBONATE PO Take 650 mg by mouth daily as needed      spironolactone (ALDACTONE) 50 MG tablet Take 1 tablet (50 mg) by mouth daily     tiotropium (SPIRIVA) 18 MCG capsule Inhale 1 capsule into the lungs daily Using PRN     traZODone (DESYREL) 50 MG tablet Take 1 tablet (50 mg) by mouth At Bedtime     vitamin B complex with vitamin C (STRESS TAB) tablet Take 1 tablet by mouth daily     vitamin B1 (THIAMINE) 100 MG tablet Take 1 tablet (100 mg) by mouth daily       ALLERGIES:     Allergies   Allergen Reactions     Blood Transfusion Related (Informational Only)      Patient has a history of a clinically significant antibody against RBC antigens.  A delay in compatible RBCs may occur.     Famotidine Unknown and Other (See Comments)      "Severe abdominal cramps     Cyclobenzaprine Hives     Methocarbamol Other (See Comments)     Made pt's \"face break out\"     Pepcid Cramps     Severe abdominal cramps     Vancomycin Other (See Comments)     hallucinations     Vfend Other (See Comments)     IV - cold sweats, Iron tablet makes him nauseated and stomach ached     Hydrocodone-Acetaminophen Rash       PERTINENT SYSTEMS REVIEW:    1. No - Do you have a history of heart attack, stroke, stent, bypass or surgery on an artery in the head, neck, heart or legs?  2. No - Do you ever have any pain or discomfort in your chest?  3. No - Do you have a history of  Heart Failure?  4. No - Are you troubled by shortness of breath when walking: On the level, up a slight hill or at night?  5. No - Do you currently have a cold, bronchitis or other respiratory infection?  6. No - Do you have a cough, shortness of breath or wheezing?  7. No - Do you sometimes get pains in the calves of your legs when you walk?  8. No - Do you or anyone in your family have previous history of blood clots?  9. No - Do you or does anyone in your family have a serious bleeding problem such as prolonged bleeding following surgeries or cuts?  10. No - Have you ever had problems with anemia or been told to take iron pills?  11. No - Have you had any abnormal blood loss such as black, tarry or bloody stools, or abnormal vaginal bleeding?  12. No - Have you ever had a blood transfusion?  13. No - Have you or any of your relatives ever had problems with anesthesia?  14. No - Do you have sleep apnea, excessive snoring or daytime drowsiness?  15. No - Do you have any prosthetic heart valves?  16. Yes: knee replacement - Do you have prosthetic joints?    EXAMINATION:  Vitals were reviewed                     Vison:  right eye, 20/30;  BAT, right eye, 20/70.  HEENT:  Cataract, otherwise unremarkable.  LUNGS:  Clear  CV:  Regular rate and rhythm without murmur  ABD:  Soft and nontender  NEURO:  Alert and " nonfocal    IMPRESSION:  Patient cleared for ophthalmic surgery.  Low risk with monitored, light sedation.  I have assessed the patient's DVT risk, and no additional orders necessary.    PLAN:  Procedure(s):  Cataract removal with implant., Right Eye      Adrian Oliveira MD

## 2019-07-29 ENCOUNTER — ANESTHESIA (OUTPATIENT)
Dept: SURGERY | Facility: CLINIC | Age: 59
End: 2019-07-29
Payer: COMMERCIAL

## 2019-07-29 ENCOUNTER — HOSPITAL ENCOUNTER (OUTPATIENT)
Facility: CLINIC | Age: 59
Discharge: HOME | End: 2019-07-29
Attending: OPHTHALMOLOGY | Admitting: OPHTHALMOLOGY
Payer: COMMERCIAL

## 2019-07-29 VITALS
SYSTOLIC BLOOD PRESSURE: 151 MMHG | BODY MASS INDEX: 28.93 KG/M2 | TEMPERATURE: 98.1 F | OXYGEN SATURATION: 100 % | RESPIRATION RATE: 16 BRPM | HEIGHT: 66 IN | WEIGHT: 180 LBS | DIASTOLIC BLOOD PRESSURE: 77 MMHG

## 2019-07-29 PROCEDURE — 25000128 H RX IP 250 OP 636: Performed by: OPHTHALMOLOGY

## 2019-07-29 PROCEDURE — 37000012 ZZH ANESTHESIA CATARACT PACKAGE: Performed by: OPHTHALMOLOGY

## 2019-07-29 PROCEDURE — 71000022 ZZH RECOVERY CATRACT PACKAGE: Performed by: OPHTHALMOLOGY

## 2019-07-29 PROCEDURE — V2632 POST CHMBR INTRAOCULAR LENS: HCPCS | Performed by: OPHTHALMOLOGY

## 2019-07-29 PROCEDURE — 25000125 ZZHC RX 250: Performed by: NURSE ANESTHETIST, CERTIFIED REGISTERED

## 2019-07-29 PROCEDURE — 25000128 H RX IP 250 OP 636: Performed by: NURSE ANESTHETIST, CERTIFIED REGISTERED

## 2019-07-29 PROCEDURE — 25800030 ZZH RX IP 258 OP 636: Performed by: NURSE ANESTHETIST, CERTIFIED REGISTERED

## 2019-07-29 PROCEDURE — 36000025 ZZH CATARACT SURGICAL PACKAGE: Performed by: OPHTHALMOLOGY

## 2019-07-29 PROCEDURE — 25000125 ZZHC RX 250: Performed by: OPHTHALMOLOGY

## 2019-07-29 DEVICE — EYE IMP IOL AMO PCL TECNIS ZCB00 22.5: Type: IMPLANTABLE DEVICE | Site: EYE | Status: FUNCTIONAL

## 2019-07-29 RX ORDER — PROPOFOL 10 MG/ML
INJECTION, EMULSION INTRAVENOUS PRN
Status: DISCONTINUED | OUTPATIENT
Start: 2019-07-29 | End: 2019-07-29

## 2019-07-29 RX ORDER — SODIUM CHLORIDE, SODIUM LACTATE, POTASSIUM CHLORIDE, CALCIUM CHLORIDE 600; 310; 30; 20 MG/100ML; MG/100ML; MG/100ML; MG/100ML
INJECTION, SOLUTION INTRAVENOUS CONTINUOUS
Status: DISCONTINUED | OUTPATIENT
Start: 2019-07-29 | End: 2019-07-29 | Stop reason: HOSPADM

## 2019-07-29 RX ORDER — CYCLOPENTOLATE HYDROCHLORIDE 10 MG/ML
1 SOLUTION/ DROPS OPHTHALMIC
Status: COMPLETED | OUTPATIENT
Start: 2019-07-29 | End: 2019-07-29

## 2019-07-29 RX ORDER — PROPARACAINE HYDROCHLORIDE 5 MG/ML
SOLUTION/ DROPS OPHTHALMIC PRN
Status: DISCONTINUED | OUTPATIENT
Start: 2019-07-29 | End: 2019-07-29 | Stop reason: HOSPADM

## 2019-07-29 RX ORDER — LIDOCAINE HYDROCHLORIDE 20 MG/ML
JELLY TOPICAL PRN
Status: DISCONTINUED | OUTPATIENT
Start: 2019-07-29 | End: 2019-07-29 | Stop reason: HOSPADM

## 2019-07-29 RX ORDER — PHENYLEPHRINE HYDROCHLORIDE 25 MG/ML
1 SOLUTION/ DROPS OPHTHALMIC
Status: COMPLETED | OUTPATIENT
Start: 2019-07-29 | End: 2019-07-29

## 2019-07-29 RX ORDER — LIDOCAINE 40 MG/G
CREAM TOPICAL
Status: DISCONTINUED | OUTPATIENT
Start: 2019-07-29 | End: 2019-07-29 | Stop reason: HOSPADM

## 2019-07-29 RX ORDER — TROPICAMIDE 10 MG/ML
1 SOLUTION/ DROPS OPHTHALMIC
Status: COMPLETED | OUTPATIENT
Start: 2019-07-29 | End: 2019-07-29

## 2019-07-29 RX ORDER — BALANCED SALT SOLUTION 6.4; .75; .48; .3; 3.9; 1.7 MG/ML; MG/ML; MG/ML; MG/ML; MG/ML; MG/ML
SOLUTION OPHTHALMIC PRN
Status: DISCONTINUED | OUTPATIENT
Start: 2019-07-29 | End: 2019-07-29 | Stop reason: HOSPADM

## 2019-07-29 RX ADMIN — PHENYLEPHRINE HYDROCHLORIDE 1 DROP: 25 SOLUTION/ DROPS OPHTHALMIC at 10:58

## 2019-07-29 RX ADMIN — CYCLOPENTOLATE HYDROCHLORIDE 1 DROP: 10 SOLUTION/ DROPS OPHTHALMIC at 10:58

## 2019-07-29 RX ADMIN — CYCLOPENTOLATE HYDROCHLORIDE 1 DROP: 10 SOLUTION/ DROPS OPHTHALMIC at 10:37

## 2019-07-29 RX ADMIN — CYCLOPENTOLATE HYDROCHLORIDE 1 DROP: 10 SOLUTION/ DROPS OPHTHALMIC at 10:52

## 2019-07-29 RX ADMIN — PROPOFOL 20 MG: 10 INJECTION, EMULSION INTRAVENOUS at 12:15

## 2019-07-29 RX ADMIN — PROPOFOL 20 MG: 10 INJECTION, EMULSION INTRAVENOUS at 12:05

## 2019-07-29 RX ADMIN — PHENYLEPHRINE HYDROCHLORIDE 1 DROP: 25 SOLUTION/ DROPS OPHTHALMIC at 10:52

## 2019-07-29 RX ADMIN — PROPOFOL 20 MG: 10 INJECTION, EMULSION INTRAVENOUS at 12:03

## 2019-07-29 RX ADMIN — SODIUM CHLORIDE, POTASSIUM CHLORIDE, SODIUM LACTATE AND CALCIUM CHLORIDE: 600; 310; 30; 20 INJECTION, SOLUTION INTRAVENOUS at 10:57

## 2019-07-29 RX ADMIN — PHENYLEPHRINE HYDROCHLORIDE 1 DROP: 25 SOLUTION/ DROPS OPHTHALMIC at 10:37

## 2019-07-29 RX ADMIN — TROPICAMIDE 1 DROP: 10 SOLUTION/ DROPS OPHTHALMIC at 10:52

## 2019-07-29 RX ADMIN — TROPICAMIDE 1 DROP: 10 SOLUTION/ DROPS OPHTHALMIC at 10:37

## 2019-07-29 RX ADMIN — TROPICAMIDE 1 DROP: 10 SOLUTION/ DROPS OPHTHALMIC at 10:58

## 2019-07-29 RX ADMIN — LIDOCAINE HYDROCHLORIDE: 10 INJECTION, SOLUTION EPIDURAL; INFILTRATION; INTRACAUDAL; PERINEURAL at 10:57

## 2019-07-29 ASSESSMENT — MIFFLIN-ST. JEOR: SCORE: 1569.22

## 2019-07-29 NOTE — OP NOTE
OPHTHALMOLOGY OPERATIVE NOTE    PATIENT: Abhijeet Mccauley  DATE OF SURGERY: 7/29/2019  PREOPERATIVE DIAGNOSIS:  Senile Nuclear Cataract, Right eye  POSTOPERATIVE DIAGNOSIS:  Senile Nuclear Cataract, Right eye  OPERATIVE PROCEDURE:  Phacoemulsification with placement of intraocular lens  SURGEON:  Adrian Oliveira MD  ANESTHESIA:  Topical / MAC  EBL:  None  SPECIMENS:  None  COMPLICATIONS:  None    PROCEDURE:  The patient was brought to the operating room at [facility].  The right eye was prepped and draped in the usual fashion for cataract surgery.  A wire lid speculum was inserted.  A super sharp blade was used to make a paracentesis at the 11 O'clock position.  The super sharp blade was used to make a partial thickness temporal groove, which was 3 mm in length.  0.8 mL of non-preserved epi-Shugarcaine was injected into the anterior chamber.  Viscoelastic was used to inflate the anterior chamber through a cannula.  A 2.5 mm microkeratome was used to make a temporal clear corneal incision in a two-plane fashion.  A cystotome needle and forceps were used to make a capsulorrhexis.  Hydrodissection and hydrodelineation were performed with Balance Salt Solution.  The lens was then phacoemulsified and removed without complications.  The cortical material was removed with bimanual irrigation and aspiration.  The capsular bag was filled with viscoelastic.  A posterior chamber intraocular lens, preselected and recorded, was folded and inserted into the capsular bag.  The viscoelastic was removed with the irrigation and aspiration tip.  Balanced Salt Solution with Vigamox, 150mg/0.1mL, was used to refill the anterior chamber.  The wounds were checked for water tightness and required no suture.  The wire lid speculum was removed.  The patient's right eye was cleaned and a drop of each post-operative drop was placed, followed by a mart shield.  The patient tolerated the procedure well, and there  were no complications.      Adrian Oliveira MD

## 2019-07-29 NOTE — ANESTHESIA CARE TRANSFER NOTE
Patient: Abhijeet Mccauley    Procedure(s):  Cataract removal with implant.    Diagnosis: Right cataract  Diagnosis Additional Information: No value filed.    Anesthesia Type:   MAC     Note:  Airway :Room Air  Patient transferred to:Phase II  Handoff Report: Identifed the Patient, Identified the Reponsible Provider, Reviewed the pertinent medical history, Discussed the surgical course, Reviewed Intra-OP anesthesia mangement and issues during anesthesia, Set expectations for post-procedure period and Allowed opportunity for questions and acknowledgement of understanding      Vitals: (Last set prior to Anesthesia Care Transfer)    CRNA VITALS  7/29/2019 1151 - 7/29/2019 1221      7/29/2019             Pulse:  70    SpO2:  98 %                Electronically Signed By: LOPEZ Montes CRNA  July 29, 2019  12:21 PM

## 2019-07-29 NOTE — ANESTHESIA POSTPROCEDURE EVALUATION
Patient: Abhijeet Mccauley    Procedure(s):  Cataract removal with implant.    Diagnosis:Right cataract  Diagnosis Additional Information: No value filed.    Anesthesia Type:  MAC    Note:  Anesthesia Post Evaluation    Patient location during evaluation: Bedside  Patient participation: Able to fully participate in evaluation  Level of consciousness: awake and alert  Pain management: adequate  Airway patency: patent  Cardiovascular status: acceptable  Respiratory status: acceptable  Hydration status: acceptable  PONV: none     Anesthetic complications: None          Last vitals:  Vitals:    07/29/19 1032 07/29/19 1226   BP: 142/67 (!) 147/69   Resp: 18 16   Temp: 36.7  C (98.1  F)          Electronically Signed By: LOPEZ Montes CRNA  July 29, 2019  12:32 PM

## 2019-07-31 ENCOUNTER — PATIENT OUTREACH (OUTPATIENT)
Dept: CARE COORDINATION | Facility: CLINIC | Age: 59
End: 2019-07-31

## 2019-07-31 ASSESSMENT — ACTIVITIES OF DAILY LIVING (ADL): DEPENDENT_IADLS:: TRANSPORTATION

## 2019-07-31 NOTE — PROGRESS NOTES
Clinic Care Coordination Contact    Follow Up Progress Note      Assessment: Pt shared that he is doing pretty good, except that his PCA left the agency & they are working to find a new one for him.    Pt gets 3.5 hours/day of PCA services from [facility] at [phone].    Pt said his cataract surgery went well & he can see much better as a result.  States he is very glad he had the procedure done.    Pt taking medication as prescribed, has cut down a lot on his drinking & smoking.  Down to 2-3 cigarettes per day.    Goals addressed this encounter:   Goals Addressed                 This Visit's Progress       Patient Stated      Psychosocial (pt-stated)   On track     Goal Statement: I will attend my medical appointments as recommended  Measure of Success: Pt will be aware of why he is scheduled & will attend medical appointments  Supportive Steps to Achieve: CC to assist pt with understanding of his medical cares & reasons for appointments  Barriers: Pt stated he has limited transportation & ability to attend appointments  Strengths: Pt wishes to remain in his own home, motivated to remain healthy  Date to Achieve By: October 1, 2019  Patient expressed understanding of goal: Yes                 Intervention/Education provided during outreach: SW and pt went over his next appointments coming up:  Dentist today in [facility] on 8-14-19.  Pt to call for transportation for the Pain clinic appointment today.     Outreach Frequency: monthly    Plan:   Pt to call for transportation for the pain clinic appointment today.    Care Coordinator will follow up in 1 month.    Leonor Ahmadi [facility]  Primary Care Clinic- Social Work Care Coordinator  [facility] [place] [place] [facility]  [phone]

## 2019-08-05 ENCOUNTER — NURSE TRIAGE (OUTPATIENT)
Dept: FAMILY MEDICINE | Facility: CLINIC | Age: 59
End: 2019-08-05

## 2019-08-05 ENCOUNTER — TELEPHONE (OUTPATIENT)
Dept: FAMILY MEDICINE | Facility: CLINIC | Age: 59
End: 2019-08-05

## 2019-08-05 NOTE — TELEPHONE ENCOUNTER
Reason for Call:  Other      Detailed comments: Pt fell a few days ago and feels like its hard to walk and has a problem in his groin area.  He feels dizzy moreso today.    Please advise    Phone Number Patient can be reached at: Cell number on file:    Telephone Information:   Mobile [phone]       Best Time: any    Can we leave a detailed message on this number? YES    Call taken on 8/5/2019 at 10:19 AM by Catherine Russell

## 2019-08-05 NOTE — TELEPHONE ENCOUNTER
"  Additional Information    Negative: Major bleeding (actively dripping or spurting) that can't be stopped    Negative: Bullet, stabbed by knife or other serious penetrating wound    Negative: Looks like a dislocated joint (crooked or deformed)    Negative: Can't stand (bear weight) or walk    Negative: Sounds like a life-threatening emergency to the triager    Negative: Wound looks infected    Negative: Puncture wound of hip area    SEVERE pain (e.g., excruciating)    Answer Assessment - Initial Assessment Questions  1. MECHANISM: \"How did the injury happen?\" (e.g., twisting injury, direct blow)       Falling while bending over off of deck, fell about 2 feet.  2. ONSET: \"When did the injury happen?\" (Minutes or hours ago)       Last Friday  3. LOCATION: \"Where is the injury located?\"       Right hip, leg, groin  4. APPEARANCE of INJURY: \"What does the injury look like?\"  (e.g., deformity of leg)      A little bit of bruising  5. SEVERITY: \"Can you put weight on that leg?\" \"Can you walk?\"       Patient can barely walk to bathroom from couch without a cane or walker  6. SIZE: For cuts, bruises, or swelling, ask: \"How large is it?\" (e.g., inches or centimeters;  entire joint)       Patient has a hard time seeing  7. PAIN: \"Is there pain?\" If so, ask: \"How bad is the pain?\"  (e.g., Scale 1-10; or mild, moderate, severe)      Pain is rated 10 of 10  8. TETANUS: For any breaks in the skin, ask: \"When was the last tetanus booster?\"      No broken skin  9. OTHER SYMPTOMS: \"Do you have any other symptoms?\"       No other symptoms    Protocols used: HIP INJURY-A-OH    "

## 2019-08-05 NOTE — TELEPHONE ENCOUNTER
"Patient called back to station  and stated that he had hit his head and had a changed LOC.    Writer called back to patient and patient said \"you again?\"  \"I don't need to talk to you again?\".    Patient did not sound any different then he did in our first ecnounter indicating no change in LOC that writer was abkle to assess in the 30 seconds before the patient hung up the phone.    Patient is belligerent and slurring words a little bit as if under the influence of a substance.    Writer called [facility] Police and they stated that they will go do a welfare check on patient with a police car and an ambulance.    No further action necessary.  Encounter closed.    Vincent Dockery RN        "

## 2019-08-05 NOTE — TELEPHONE ENCOUNTER
S:  Patient called with status update    B:  Patient has had concerning symptoms for last two days.    A:  Writer called patient to triage.    R:  Triage call completed.    Vincent Dockery RN

## 2019-08-05 NOTE — TELEPHONE ENCOUNTER
Writer instructed patient to go to ER.    Patient declined stating that he will not go to the ER ever again.    Patient wanted appointment scheduled tomorrow around his 2pm eye appointment.    There were no available appointments at or around 2pm tomorrow.  Patient declined a 5pm appointment today and a 10am appointment tomorrow.    Patient stated he will go down to the urgent care after his 2pm eye appointment tomorrow.    No further action necessary.  Encounter closed.    Vincent Dockery RN

## 2019-08-06 ENCOUNTER — OFFICE VISIT (OUTPATIENT)
Dept: AUDIOLOGY | Facility: CLINIC | Age: 59
End: 2019-08-06
Payer: COMMERCIAL

## 2019-08-06 DIAGNOSIS — H90.3 SENSORINEURAL HEARING LOSS, ASYMMETRICAL: Primary | ICD-10-CM

## 2019-08-06 PROCEDURE — V5299 HEARING SERVICE: HCPCS | Performed by: AUDIOLOGIST

## 2019-08-06 PROCEDURE — 99207 ZZC NO CHARGE LOS: CPT | Performed by: AUDIOLOGIST

## 2019-08-06 NOTE — PROGRESS NOTES
[facility]         SUBJECTIVE:  Abhijeet Mccauley, 58 year old male comes in for in office repair of his 2019 Phonak Bolero B50-R hearing aids and earmolds. He reports both hearing aids are not working. He is very frustrated with the hearing aids.     OBJECTIVE:  Examination again reveals that both earmolds are plugged with cerumen. Again demonstrated how to clean the earmolds. Gave patient hearing aid  threads to clean the earmold. Uncertain if patient will be able to do this cleaning on his own. Verified hearing aid functionality.      ASSESSMENT/PLAN:    Return to clinic as needed. See chart in the hearing aid room.     Ai Min M.A. -[facility], #[id number]

## 2019-08-28 ENCOUNTER — OFFICE VISIT (OUTPATIENT)
Dept: FAMILY MEDICINE | Facility: CLINIC | Age: 59
End: 2019-08-28
Payer: COMMERCIAL

## 2019-08-28 ENCOUNTER — ANCILLARY PROCEDURE (OUTPATIENT)
Dept: GENERAL RADIOLOGY | Facility: CLINIC | Age: 59
End: 2019-08-28
Attending: INTERNAL MEDICINE
Payer: COMMERCIAL

## 2019-08-28 VITALS
WEIGHT: 179 LBS | BODY MASS INDEX: 28.89 KG/M2 | OXYGEN SATURATION: 96 % | DIASTOLIC BLOOD PRESSURE: 60 MMHG | RESPIRATION RATE: 16 BRPM | TEMPERATURE: 99.3 F | HEART RATE: 76 BPM | SYSTOLIC BLOOD PRESSURE: 128 MMHG

## 2019-08-28 DIAGNOSIS — I95.1 ORTHOSTATIC HYPOTENSION: ICD-10-CM

## 2019-08-28 DIAGNOSIS — R05.9 COUGH: ICD-10-CM

## 2019-08-28 DIAGNOSIS — M25.551 HIP PAIN, RIGHT: ICD-10-CM

## 2019-08-28 DIAGNOSIS — R60.0 PERIPHERAL EDEMA: ICD-10-CM

## 2019-08-28 DIAGNOSIS — D64.9 NORMOCYTIC ANEMIA: ICD-10-CM

## 2019-08-28 DIAGNOSIS — J44.1 COPD EXACERBATION (H): Primary | ICD-10-CM

## 2019-08-28 LAB
ANION GAP SERPL CALCULATED.3IONS-SCNC: 10 MMOL/L (ref 3–14)
BUN SERPL-MCNC: 22 MG/DL (ref 7–30)
CALCIUM SERPL-MCNC: 9.3 MG/DL (ref 8.5–10.1)
CHLORIDE SERPL-SCNC: 102 MMOL/L (ref 94–109)
CO2 SERPL-SCNC: 21 MMOL/L (ref 20–32)
CREAT SERPL-MCNC: 1.93 MG/DL (ref 0.66–1.25)
ERYTHROCYTE [DISTWIDTH] IN BLOOD BY AUTOMATED COUNT: 23.2 % (ref 10–15)
GFR SERPL CREATININE-BSD FRML MDRD: 37 ML/MIN/{1.73_M2}
GLUCOSE SERPL-MCNC: 80 MG/DL (ref 70–99)
HCT VFR BLD AUTO: 26.3 % (ref 40–53)
HGB BLD-MCNC: 8.1 G/DL (ref 13.3–17.7)
MCH RBC QN AUTO: 23.8 PG (ref 26.5–33)
MCHC RBC AUTO-ENTMCNC: 30.8 G/DL (ref 31.5–36.5)
MCV RBC AUTO: 77 FL (ref 78–100)
PLATELET # BLD AUTO: 98 10E9/L (ref 150–450)
POTASSIUM SERPL-SCNC: 4.2 MMOL/L (ref 3.4–5.3)
RBC # BLD AUTO: 3.4 10E12/L (ref 4.4–5.9)
SODIUM SERPL-SCNC: 133 MMOL/L (ref 133–144)
WBC # BLD AUTO: 11.3 10E9/L (ref 4–11)

## 2019-08-28 PROCEDURE — 71046 X-RAY EXAM CHEST 2 VIEWS: CPT

## 2019-08-28 PROCEDURE — 99214 OFFICE O/P EST MOD 30 MIN: CPT | Performed by: INTERNAL MEDICINE

## 2019-08-28 PROCEDURE — 85027 COMPLETE CBC AUTOMATED: CPT | Performed by: INTERNAL MEDICINE

## 2019-08-28 PROCEDURE — 36415 COLL VENOUS BLD VENIPUNCTURE: CPT | Performed by: INTERNAL MEDICINE

## 2019-08-28 PROCEDURE — 80048 BASIC METABOLIC PNL TOTAL CA: CPT | Performed by: INTERNAL MEDICINE

## 2019-08-28 RX ORDER — PREDNISONE 20 MG/1
40 TABLET ORAL DAILY
Qty: 10 TABLET | Refills: 0 | Status: SHIPPED | OUTPATIENT
Start: 2019-08-28 | End: 2020-02-19

## 2019-08-28 RX ORDER — SPIRONOLACTONE 25 MG/1
25 TABLET ORAL DAILY
Qty: 90 TABLET | Refills: 3 | Status: SHIPPED | OUTPATIENT
Start: 2019-08-28 | End: 2020-02-27

## 2019-08-28 ASSESSMENT — PAIN SCALES - GENERAL: PAINLEVEL: SEVERE PAIN (7)

## 2019-08-28 NOTE — PROGRESS NOTES
Subjective     Abhijeet Mccauley is a 58 year old male who presents to clinic today for the following health issues:  Chief Complaint   Patient presents with     Fall     x 2 weeks, fell off deck - 2' up. right hip injury     Medication Reconciliation     patient does not know what medications he is taking      HPI     I saw Abhijeet last on July 16.  Advised him to follow-up with GI and hematology (not scheduled) for his anemia and plan to recheck hemoglobin in 1 month (ordered).  I planned to recheck his edema after giving him more time on spironolactone.    He has not noticed any blood in the stool for the past month.        Fall w/ injury to hip      Duration: x 2 weeks.     Description (location/character/radiation): Patient reports being on deck, bent over to  something, lost balance and fell off deck, injured right hip and head.  Had bent over to pick something up and then stood back up and felt very dizzy and passed out.      Intensity:  moderate    Accompanying signs and symptoms: cannot walk well due to injury- though today is finally better.  No ongoing headache.  Initially had some right groin pain but this resolved, now just lateral hip pain.  No numbness or tingling.      History (similar episodes/previous evaluation): None    Precipitating or alleviating factors: walking exacerbates symptoms     Therapies tried and outcome: trazadone       He's had a cold recently with SOB and epistaxis.  No hemoptysis.  This has been going on for about 1.5 weeks.  Occasional subjective fevers and chills.  Improved initially but then got worse again.  Has sore throat, no ear pain.        Patient Active Problem List   Diagnosis     Essential hypertension     Acute myeloid leukemia in remission (H)     Sensorineural hearing loss, asymmetrical     Alcoholic cirrhosis of liver (H)     Acute alcoholic hepatitis     Coagulation defect (H)     Osteoarthrosis     Thrombocytopenia (H)     Universal ulcerative (chronic)  colitis(556.6) (H)     CKD (chronic kidney disease) stage 3, GFR 30-59 ml/min (H)     Rosacea     Mild intermittent asthma without complication     Peptic ulcer disease     Uncomplicated alcohol dependence (H)     Tobacco dependence syndrome     Tubular adenoma of colon     Normocytic anemia     Leukocytosis with increased monocytes     Generalized weakness     COPD exacerbation (H)     AIDP (acute inflammatory demyelinating polyneuropathy) (H)     Past Surgical History:   Procedure Laterality Date     AS TOTAL KNEE ARTHROPLASTY Left      BONE MARROW BIOPSY, BONE SPECIMEN, NEEDLE/TROCAR N/A 6/12/2018    Procedure: BIOPSY BONE MARROW;  Bone Marrow Biopsy;  Surgeon: Demar Sapp MD;  Location: [facility]     ESOPHAGOSCOPY, GASTROSCOPY, DUODENOSCOPY (EGD), COMBINED N/A 2/8/2018    Procedure: COMBINED ESOPHAGOSCOPY, GASTROSCOPY, DUODENOSCOPY (EGD), BIOPSY SINGLE OR MULTIPLE;  gastroscopy with biopsies;  Surgeon: Raz Cobb MD;  Location: [facility]     ESOPHAGOSCOPY, GASTROSCOPY, DUODENOSCOPY (EGD), COMBINED N/A 3/18/2018    Procedure: COMBINED ESOPHAGOSCOPY, GASTROSCOPY, DUODENOSCOPY (EGD);;  Surgeon: Raz Cobb MD;  Location: [facility]     LACERATION REPAIR Right     Right leg     PHACOEMULSIFICATION WITH STANDARD INTRAOCULAR LENS IMPLANT Left 7/1/2019    Procedure: cataract removal with implant.;  Surgeon: Adrian Oliveira MD;  Location: [facility]     PHACOEMULSIFICATION WITH STANDARD INTRAOCULAR LENS IMPLANT Right 7/29/2019    Procedure: Cataract removal with implant.;  Surgeon: Adrian Oliveira MD;  Location: [facility]       Social History     Tobacco Use     Smoking status: Current Every Day Smoker     Packs/day: 0.50     Years: 30.00     Pack years: 15.00     Types: Cigarettes     Smokeless tobacco: Never Used     Tobacco comment: I strongly emphasized the importance of quitting smoking. 4-5 daily   Substance Use Topics     Alcohol use: Yes     Comment: Down to 3 beers per day.  Sometimes a  cocktail also.     Family History   Problem Relation Age of Onset     Hypertension Mother      Coronary Artery Disease Father         MI         Current Outpatient Medications   Medication Sig Dispense Refill     predniSONE (DELTASONE) 20 MG tablet Take 2 tablets (40 mg) by mouth daily for 5 days 10 tablet 0     spironolactone (ALDACTONE) 25 MG tablet Take 1 tablet (25 mg) by mouth daily 90 tablet 3     albuterol (VENTOLIN HFA) 108 (90 Base) MCG/ACT inhaler Inhale 2 puffs into the lungs every 4 hours as needed for shortness of breath / dyspnea or wheezing 18 g 11     baclofen (LIORESAL) 10 MG tablet Take 0.5-1 tablets (5-10 mg) by mouth 3 times daily Take 1/2 to one tablet up to three times a day 90 tablet 3     buPROPion (WELLBUTRIN SR) 150 MG 12 hr tablet Take once per day for 3 days and then increase to twice per day 60 tablet 12     cholestyramine (QUESTRAN) 4 g packet Take 1 packet (4 g) by mouth 2 times daily (with meals) 60 each 11     desonide (DESOWEN) 0.05 % external lotion Apply topically as needed 118 mL 1     furosemide (LASIX) 20 MG tablet Take 1 tablet (20 mg) by mouth every morning 90 tablet 3     gabapentin (NEURONTIN) 300 MG capsule Take 2 capsules (600 mg) by mouth 3 times daily 180 capsule 11     loratadine (CLARITIN) 10 MG tablet Take 1 tablet (10 mg) by mouth daily as needed for allergies 30 tablet 6     mometasone-formoterol (DULERA) 200-5 MCG/ACT oral inhaler Inhale 2 puffs into the lungs 2 times daily        order for DME Equipment being ordered: 2 pairs thigh high compression stockings, strength per patient preference 2 Units 1     order for DME Equipment being ordered: Compression Stockings TWO (2) Pairs, 15-20 mm Hg. 2 Package prn     order for DME Equipment being ordered: Manual wheelchair.  Estimated duration- 3 months 1 Units 0     order for DME Equipment being ordered: bed pull up bar 1 Units 0     order for DME Equipment being ordered: Wheelchair 1 each 0     order for DME Equipment  "being ordered: shower chair 1 Device 0     order for DME Equipment being ordered: Toilet seat riser 1 Device 0     pantoprazole (PROTONIX) 40 MG EC tablet Take 1 tablet (40 mg) by mouth daily 90 tablet 3     POTASSIUM CHLORIDE PO        pregabalin (LYRICA) 75 MG capsule Take 1 capsule (75 mg) by mouth 2 times daily 60 capsule 5     propranolol (INDERAL) 20 MG tablet Take 1 tablet (20 mg) by mouth 2 times daily 180 tablet 3     rifaximin (XIFAXAN) 550 MG TABS tablet Take 1 tablet (550 mg) by mouth 2 times daily 180 tablet 3     SODIUM BICARBONATE PO Take 650 mg by mouth daily as needed        tiotropium (SPIRIVA) 18 MCG capsule Inhale 1 capsule into the lungs daily Using PRN       traZODone (DESYREL) 50 MG tablet Take 1 tablet (50 mg) by mouth At Bedtime 90 tablet 3     vitamin B complex with vitamin C (STRESS TAB) tablet Take 1 tablet by mouth daily 90 tablet 3     vitamin B1 (THIAMINE) 100 MG tablet Take 1 tablet (100 mg) by mouth daily 90 tablet 3     Allergies   Allergen Reactions     Blood Transfusion Related (Informational Only)      Patient has a history of a clinically significant antibody against RBC antigens.  A delay in compatible RBCs may occur.     Famotidine Unknown and Other (See Comments)     Severe abdominal cramps     Cyclobenzaprine Hives     Methocarbamol Other (See Comments)     Made pt's \"face break out\"     Pepcid Cramps     Severe abdominal cramps     Vancomycin Other (See Comments)     hallucinations     Vfend Other (See Comments)     IV - cold sweats, Iron tablet makes him nauseated and stomach ached     Hydrocodone-Acetaminophen Rash       Reviewed and updated as needed this visit by Provider         Review of Systems   ROS COMP: Constitutional, HEENT, cardiovascular, MSK systems are negative, except as otherwise noted.      Objective    /60 (BP Location: Right arm, Patient Position: Sitting, Cuff Size: Adult Regular)   Pulse 76   Temp 99.3  F (37.4  C) (Tympanic)   Resp 16   Wt " 81.2 kg (179 lb)   SpO2 95%   BMI 28.89 kg/m    Body mass index is 28.89 kg/m .  Physical Exam     GENERAL: alert and no distress  EYES: PERRL and conjunctivae and sclerae normal  HENT:  mouth without ulcers or lesions, oropharynx normal  NECK: no adenopathy, no asymmetry, masses, or scars  RESP: Diffuse auditory wheezes  CV: regular rate and rhythm, normal S1 S2, no S3 or S4, no murmur, click or rub  MSK: Pain to palpation of the lateral right hip, no groin pain with range of motion exercises of the hip      Repeat Blood Pressure:  BP Pulse Site Cuff Size Time Date   128/60 76 Right arm Adult Regular 11:31 AM 8/28/2019     Orthostatic Vitals  BP Pulse Position Site Cuff Size Time Date   131/58 74 Supine Left arm Adult Regular 12:24 PM 8/28/2019   139/66 76 Sitting Left arm Adult Regular 12:27 PM 8/28/2019   Comment: pt reports feeling a little dizzy going from supine to sit   117/55 82 Standing Left arm Adult Regular 12:29 PM 8/28/2019     Peak Flow Information  Peak Flow Resp Time Date   --- 16 11:31 AM 8/28/2019     Pain Information  Score Location Time Date   Severe Pain (7) Hip 11:31 AM 8/28/2019        Diagnostic Test Results:  Labs reviewed in Epic  Results for orders placed or performed in visit on 08/28/19 (from the past 24 hour(s))   **Basic metabolic panel FUTURE 14d   Result Value Ref Range    Sodium 133 133 - 144 mmol/L    Potassium 4.2 3.4 - 5.3 mmol/L    Chloride 102 94 - 109 mmol/L    Carbon Dioxide 21 20 - 32 mmol/L    Anion Gap 10 3 - 14 mmol/L    Glucose 80 70 - 99 mg/dL    Urea Nitrogen 22 7 - 30 mg/dL    Creatinine 1.93 (H) 0.66 - 1.25 mg/dL    GFR Estimate 37 (L) >60 mL/min/[1.73_m2]    GFR Estimate If Black 42 (L) >60 mL/min/[1.73_m2]    Calcium 9.3 8.5 - 10.1 mg/dL   CBC with platelets   Result Value Ref Range    WBC 11.3 (H) 4.0 - 11.0 10e9/L    RBC Count 3.40 (L) 4.4 - 5.9 10e12/L    Hemoglobin 8.1 (L) 13.3 - 17.7 g/dL    Hematocrit 26.3 (L) 40.0 - 53.0 %    MCV 77 (L) 78 - 100 fl    MCH  23.8 (L) 26.5 - 33.0 pg    MCHC 30.8 (L) 31.5 - 36.5 g/dL    RDW 23.2 (H) 10.0 - 15.0 %    Platelet Count 98 (L) 150 - 450 10e9/L   XR Chest 2 Views    Narrative    CHEST TWO VIEWS  8/28/2019 12:21 PM     HISTORY: Cough.    COMPARISON: 3/15/2019    FINDINGS: Heart size and pulmonary vascularity are within normal  limits. The lungs are clear. No pneumothorax or pleural effusion.       Impression    IMPRESSION: No radiographic evidence of acute chest abnormality.     MAGGY KAMINSKI MD           Assessment & Plan     1. COPD exacerbation (H)    Abhijeet presents with cough and significant wheezes on exam, likely due to COPD exacerbation.  Chest x-ray was clear, no pneumonia seen.  Most likely a viral cause.  Recommend treatment with prednisone and continue inhaler.  Follow-up as needed if not improving in a week.    - predniSONE (DELTASONE) 20 MG tablet; Take 2 tablets (40 mg) by mouth daily for 5 days  Dispense: 10 tablet; Refill: 0    2. Cough    - XR Chest 2 Views    3. Orthostatic hypotension    He reports that the fall on his deck happened after he experienced dizziness after standing up from bending over.  His blood pressure did drop a fair bit with standing in clinic today, so possibly his dizziness is related to orthostatic hypotension.  We will decrease his spironolactone dose from 50 mg to 25 mg to see if this helps with his symptoms.    4. Peripheral edema    Improved since resuming spironolactone, decreasing doses above due to possible side effects    - **Basic metabolic panel FUTURE 14d  - spironolactone (ALDACTONE) 25 MG tablet; Take 1 tablet (25 mg) by mouth daily  Dispense: 90 tablet; Refill: 3    5. Normocytic anemia    Anemia is improved from last month.  He has not noticed any further bleeding in the stool.  I again recommended he schedule follow-up with GI and hematology.    - CBC with platelets    6. Hip pain, right    Hip pain is lateral on exam, no groin pain, do not suspect a fracture, pain  likely due to deep bruising.  Continue home cares.       Return in about 1 month (around 9/28/2019) for with specialists and myself.    Chidi Valenzuela MD  [facility]    Chart documentation was done using Dragon dictation software. Although reviewed after completion, some errors may remain.

## 2019-08-28 NOTE — Clinical Note
Could you follow-up with Abhijeet when you get a chance to make sure he is scheduling follow-up with GI and hematology?  Thanks.

## 2019-08-28 NOTE — LETTER
August 29, 2019      Abhijeet Mccauley  [address] [place] [id number]        Dear ,    We are writing to inform you of your test results.          Your kidney function is a bit improved from last check.  Electrolytes are also improved.  Platelet level has decreased some.  Slightly elevated white blood cell counts likely related to his COPD exacerbation.  Hemoglobin is improved.  No change to plan that we discussed in clinic today.      Resulted Orders   **Basic metabolic panel FUTURE 14d   Result Value Ref Range    Sodium 133 133 - 144 mmol/L    Potassium 4.2 3.4 - 5.3 mmol/L    Chloride 102 94 - 109 mmol/L    Carbon Dioxide 21 20 - 32 mmol/L    Anion Gap 10 3 - 14 mmol/L    Glucose 80 70 - 99 mg/dL    Urea Nitrogen 22 7 - 30 mg/dL    Creatinine 1.93 (H) 0.66 - 1.25 mg/dL    GFR Estimate 37 (L) >60 mL/min/[1.73_m2]      Comment:      Non  GFR Calc  Starting 12/18/2018, serum creatinine based estimated GFR (eGFR) will be   calculated using the Chronic Kidney Disease Epidemiology Collaboration   (CKD-EPI) equation.      GFR Estimate If Black 42 (L) >60 mL/min/[1.73_m2]      Comment:       GFR Calc  Starting 12/18/2018, serum creatinine based estimated GFR (eGFR) will be   calculated using the Chronic Kidney Disease Epidemiology Collaboration   (CKD-EPI) equation.      Calcium 9.3 8.5 - 10.1 mg/dL   CBC with platelets   Result Value Ref Range    WBC 11.3 (H) 4.0 - 11.0 10e9/L    RBC Count 3.40 (L) 4.4 - 5.9 10e12/L    Hemoglobin 8.1 (L) 13.3 - 17.7 g/dL    Hematocrit 26.3 (L) 40.0 - 53.0 %    MCV 77 (L) 78 - 100 fl    MCH 23.8 (L) 26.5 - 33.0 pg    MCHC 30.8 (L) 31.5 - 36.5 g/dL    RDW 23.2 (H) 10.0 - 15.0 %    Platelet Count 98 (L) 150 - 450 10e9/L       If you have any questions or concerns, please call the clinic at the number listed above.       Sincerely,        Chidi Valenzuela MD

## 2019-08-28 NOTE — PATIENT INSTRUCTIONS
Please call to schedule with hematology and GI.      Your provider has referred you to: [facility]: Cancer Care/Hematology (All Cancer Related Services) -   [place] [phone]   [email or web address]        Preferred Location: [facility] [phone], [facility], [facility]: [phone] and [facility] [phone]    Please decrease your spironolactone to 25mg daily (instead of 50mg)- this may be dropping your blood pressure too much and causing the dizziness.

## 2019-08-31 DIAGNOSIS — M62.838 MUSCLE SPASM: ICD-10-CM

## 2019-09-03 RX ORDER — BACLOFEN 10 MG/1
TABLET ORAL
Qty: 90 TABLET | Refills: 3 | Status: SHIPPED | OUTPATIENT
Start: 2019-09-03 | End: 2019-12-31

## 2019-09-03 NOTE — TELEPHONE ENCOUNTER
Requested Prescriptions   Pending Prescriptions Disp Refills     baclofen (LIORESAL) 10 MG tablet [Pharmacy Med Name: BACLOFEN 10MG TABS] 90 tablet 3     Sig: TAKE 1/2 TO 1 TABLET BY MOUTH UP TO THREE TIMES A DAY   Last Written Prescription Date:  5/2/19  Last Fill Quantity: 90 tab,  # refills: 3   Last office visit: 8/28/2019 with prescribing provider:  Chidi Valenzuela     Future Office Visit:   Next 5 appointments (look out 90 days)    Sep 10, 2019  1:30 PM CDT  Return Visit with Landen Quiñones MD  [facility] [place] ([facility]) [address]  [address]  [facility] [address]  [phone]   Sep 30, 2019 12:40 PM CDT  SHORT with Chidi Valenzuela MD  [facility] [address] [address]  [phone]             There is no refill protocol information for this order

## 2019-09-03 NOTE — TELEPHONE ENCOUNTER
Routing refill request to provider for review/approval because:  Drug not on the [facility] refill protocol     Thank you    Yessica FISHER RN

## 2019-09-04 ENCOUNTER — TELEPHONE (OUTPATIENT)
Dept: FAMILY MEDICINE | Facility: CLINIC | Age: 59
End: 2019-09-04

## 2019-09-04 ENCOUNTER — PATIENT OUTREACH (OUTPATIENT)
Dept: CARE COORDINATION | Facility: CLINIC | Age: 59
End: 2019-09-04

## 2019-09-04 DIAGNOSIS — J44.1 COPD EXACERBATION (H): Primary | ICD-10-CM

## 2019-09-04 RX ORDER — PREDNISONE 20 MG/1
TABLET ORAL
Qty: 20 TABLET | Refills: 0 | Status: SHIPPED | OUTPATIENT
Start: 2019-09-04 | End: 2019-11-11

## 2019-09-04 ASSESSMENT — ACTIVITIES OF DAILY LIVING (ADL): DEPENDENT_IADLS:: TRANSPORTATION

## 2019-09-04 NOTE — PROGRESS NOTES
Patient called with the Prednisone  Being extended for him and the need to be seen if not improving. Catherine WANG RN

## 2019-09-04 NOTE — TELEPHONE ENCOUNTER
Dr. Valenzuela,    The patient is called about clarification of medication.  It is Prednisone he is stating that is not working not Baclofen.  When I have explained that you wanted to see him back in a week if not improving the patient tells this RN that he is the process of moving and can not come in. Please advise. Catherine WANG RN

## 2019-09-04 NOTE — PROGRESS NOTES
Prescription for longer prednisone taper sent to pharmacy, but if still not improving with this, he should follow-up in clinic.    Thanks,  Chidi Valenzuela MD

## 2019-09-04 NOTE — TELEPHONE ENCOUNTER
Reason for Call:  Medication question:    Do you use a [facility]?  Name of the pharmacy and phone number for the current request:  [facility]    Name of the medication requested: Baclofen 10mg not working for respiratory issues, need new prescription    Other request: If possible, today 9/4/19  Can we leave a detailed message on this number? YES    Phone number patient can be reached at: Cell number on file:    Telephone Information:   Mobile [phone]       Best Time: Any    Call taken on 9/4/2019 at 11:07 AM by Lia Montalvo

## 2019-09-04 NOTE — TELEPHONE ENCOUNTER
Reason for call:  Patient reporting a symptom    Symptom or request: Patient had 2 units of blood yesterday, patient started to have blood in stool in the afternoon. He said he has been having spots in stool. First thing in the morning is a lot then it lightens up. Color is red. Stools are light brown.    Duration (how long have symptoms been present): yesterday    Have you been treated for this before? No    Phone Number patient can be reached at:  Cell number on file:    Telephone Information:   Mobile [phone]       Best Time:  any    Can we leave a detailed message on this number:  YES    Call taken on 7/12/2019 at 8:27 AM by Karen Menjivar     None

## 2019-09-04 NOTE — PROGRESS NOTES
"Clinic Care Coordination Contact    Follow Up Progress Note      Assessment: SW was asked by PCP to assist pt with scheduling appointments for GI & Hematology.      Sw placed call to pt and introduced self, title and role.  Pt shared that he continues to feel poorly from his cold, stating that he does not wish to engage with his other medical appointments until he feels better.  Pt states he has no energy & has SOB at times.    SW discussed that Hematology & GI may be able to assist with his symptom management, but pt stated that his labs were good last time & he just wants to get over this cold before addressing more issues.  SW offered to conference call scheduling with the pt, but he declined, stating he felt lousy.    Pt requested additional doses of prednisone.  SW explained that all medications must come via the PCP, not this writer, but stated that I will route my note to PCP for guidance.  Pt expressed gratitude.    Goals addressed this encounter:   Goals Addressed                 This Visit's Progress       Patient Stated      Psychosocial (pt-stated)        Goal Statement: I will attend my medical appointments as recommended  Measure of Success: Pt will be aware of why he is scheduled & will attend medical appointments  Supportive Steps to Achieve: SWCC to assist pt with understanding of his medical cares & reasons for appointments  Barriers: Pt stated he has limited transportation & ability to attend appointments  Strengths: Pt wishes to remain in his own home, motivated to remain healthy  Date to Achieve By: October 1, 2019  Patient expressed understanding of goal: Yes    Update 9-4-19:  PCP recommends GI & Hematology appointments, however, pt wishes to \"get over his cold before doing anything else.\"                 Intervention/Education provided during outreach: SW and pt discussed the need for GI & Hematology appointments.  Pt said he will make the appointments once he feels better, despite this " writer's willingness to do a conference call to get appointments scheduled today.     Outreach Frequency: monthly    Plan:   SW routing note to PCP & RN care team for awareness that pt remains feeling poorly & requesting medications.  Care Coordinator will follow up in 2 weeks.    Leonor Ortiz  Primary Care Clinic- Social Work Care Coordinator  [facility]  9/4/2019 11:33 AM  [phone]

## 2019-09-05 ENCOUNTER — TELEPHONE (OUTPATIENT)
Dept: AUDIOLOGY | Facility: CLINIC | Age: 59
End: 2019-09-05

## 2019-09-05 NOTE — TELEPHONE ENCOUNTER
Reason for Call:  Other     Detailed comments: Pt thinks he may have mixed up his hearing aids - which side does the red go on? - Please advise    Phone Number Patient can be reached at: Cell number on file:    Telephone Information:   Mobile [phone]       Best Time: Any    Can we leave a detailed message on this number? YES    Call taken on 9/5/2019 at 11:33 AM by Denise Behrendt

## 2019-09-09 ENCOUNTER — TELEPHONE (OUTPATIENT)
Dept: FAMILY MEDICINE | Facility: CLINIC | Age: 59
End: 2019-09-09

## 2019-09-09 NOTE — TELEPHONE ENCOUNTER
Patient reports:  He had 8 teeth removed two weeks ago  He is concerned about an infection  He denies symptoms of infection, reports having pain in his mouth he has a hard time talking and wearing his false teeth.  He has appointment with dentist on Wednesday 9/11/19.  Advised that patient keep his appointment with his dentist and discuss concerns of pain at his dental appt.  Patient verbalized understanding and agreed with this plan.    Racquel ESTRADA Rn

## 2019-09-09 NOTE — TELEPHONE ENCOUNTER
Reason for Call:  Mouth pain    Detailed comments: patient is calling and stating he is in terrible pain from his 8 teeth being extracted last week. He did call his Dentist, but they cannot see him until Wed. He states they gave him no pain medications or antibiotics. Please advise. Patient will be at [place] tomorrow for PT and wondering if he could be seen then. Please advise.    Phone Number Patient can be reached at: Home number on file [phone] (home)    Best Time: any    Can we leave a detailed message on this number? YES   Lia Romo  Clinic Station  Flex      Call taken on 9/9/2019 at 12:39 PM by Lia Romo

## 2019-09-10 ENCOUNTER — OFFICE VISIT (OUTPATIENT)
Dept: PALLIATIVE MEDICINE | Facility: CLINIC | Age: 59
End: 2019-09-10
Payer: COMMERCIAL

## 2019-09-10 VITALS
BODY MASS INDEX: 28.89 KG/M2 | SYSTOLIC BLOOD PRESSURE: 144 MMHG | HEART RATE: 76 BPM | WEIGHT: 179 LBS | DIASTOLIC BLOOD PRESSURE: 70 MMHG

## 2019-09-10 DIAGNOSIS — K70.10 ACUTE ALCOHOLIC HEPATITIS (H): ICD-10-CM

## 2019-09-10 DIAGNOSIS — G61.0 AIDP (ACUTE INFLAMMATORY DEMYELINATING POLYNEUROPATHY) (H): ICD-10-CM

## 2019-09-10 DIAGNOSIS — F17.200 TOBACCO DEPENDENCE SYNDROME: ICD-10-CM

## 2019-09-10 DIAGNOSIS — D68.9 COAGULATION DEFECT (H): ICD-10-CM

## 2019-09-10 DIAGNOSIS — C92.01 ACUTE MYELOID LEUKEMIA IN REMISSION (H): ICD-10-CM

## 2019-09-10 DIAGNOSIS — G89.29 CHRONIC NECK AND BACK PAIN: Primary | ICD-10-CM

## 2019-09-10 DIAGNOSIS — M54.9 CHRONIC NECK AND BACK PAIN: Primary | ICD-10-CM

## 2019-09-10 DIAGNOSIS — M54.2 CHRONIC NECK AND BACK PAIN: Primary | ICD-10-CM

## 2019-09-10 DIAGNOSIS — F10.20 UNCOMPLICATED ALCOHOL DEPENDENCE (H): ICD-10-CM

## 2019-09-10 DIAGNOSIS — M17.12 PRIMARY OSTEOARTHRITIS OF LEFT KNEE: ICD-10-CM

## 2019-09-10 DIAGNOSIS — N18.30 CKD (CHRONIC KIDNEY DISEASE) STAGE 3, GFR 30-59 ML/MIN (H): ICD-10-CM

## 2019-09-10 PROCEDURE — 99214 OFFICE O/P EST MOD 30 MIN: CPT | Performed by: ANESTHESIOLOGY

## 2019-09-10 ASSESSMENT — PAIN SCALES - GENERAL: PAINLEVEL: EXTREME PAIN (8)

## 2019-09-10 NOTE — PROGRESS NOTES
[facility]    Date of visit: 9/10/2019    Chief complaint:   Chief Complaint   Patient presents with     Pain       Interval history:  Abhijeet Mccauley was last seen by me on 2/13/19.      Recommendations/plan at the last visit included:  Plan:  1. Physical Therapy:  PT eval and treat ordered today  2. Clinical Health Psychologist to address issues of relaxation, behavioral change, coping style, and other factors important to improvement.  deferred  3. Diagnostic Studies:  Reviewed diagnostic imaging  4. Medication Management:    1. Continue Ibuprofen 400 mg Q6H prn pain  2. Continue Tylenol 1000 mg TID  3. Continue Trazodone 50 mg QHS  4. Trial of Keppra 250 mg BID for nerve pain  5. Will certify for Medical Cannabis once paperwork is completed and returned  5. Further procedures recommended:   1. May benefit from cervical epidural steroid injection C7-T1  2. May benefit from lumbar epidural steroid injection  3. Repeat hip bursa injections if needed  4. Repeat trigger point injections if needed  6. Recommendations to PCP: continue current treatment  7. Patient instructed to quit drinking as well  8. Smoking cessation and smoking effects on healing and chronic pain discussed with patient   9. Follow up: 3 months - patient instructed to call for injections if he desires to proceed    Since his last visit, Abhijeet Mccauley reports:  He was seen in the ED on 2/25/19 after a fall from a ladder with complaints of increased neck and back pain.  Imaging was negative for acute injury.  He was sent home with Percocet 5/325.  He was seen in the ED on 3/5/19 with complaints of nausea, pain and fatigue with admitted use of alcohol.  He received IV pain medications and was sent home with #6 tabs of Percocet and Zofran.  He was then seen in the ED on 3/9/19 for generalized weakness and pain and was noted to be confrontational, belligerent, and irritable.  He was found to have a UTI and was given Rx for  antibiotics and Percocet.  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      He presents today with diffuse neck and back pain associated with pain in the arms and legs.  His worst pain is in the back of the neck and between his shoulder blades.  He complains of numbness down the upper extremities that is improving since he was in rehab at [facility].      He also complains of pain in the lower back associated with numbness in the legs bilaterally.  He feels that his lower extremity numbness is getting better since treatment at the Rehab center as well.    Since being started on Gabapentin and Lyrica he feels that his pain is overall better and more manageable as well.    He feels unsteady while walking, but this is chronic and related to years of alcohol abuse.    He does continue to drink and will admit to at least 4 drinks per night.    He describes his pain as constant, shooting, throbbing, stabbing, and miserable.    Pain scores:  Pain intensity on average is 9 on a scale of 0-10.  Ranges from 8/10 to 9/10.    [place] Board of Pharmacy Data Base Reviewed:    N/A; not on opioids    Current pain treatments:   Baclofen 10 mg TID prn - states only taking once a day  Gabapentin 600 mg TID  Lyrica 75 mg BID  Ibuprofen 400 mg Q6H prn  Wellbutrin  mg BID  Trazodone 50 mg QHS  Tylenol 500 mg 1-2 tabs Q6H prn    Recent Medrol Dose pack (9/4/19)    Past pain treatments:  Hydrocodone - rash  Flexeril - hives  Robaxin - made face break out  Gabapentin - made face break out     Injections:  - 11/8/18 right hip bursa injection, trigger point injections - helpful  - 8/7/18 right hip bursa injection, cervical trigger point injections - helpful    Date of last UDS:  Not on opioids  Component  Value Flag Ref Range Units Status Collected Lab   Ethanol g/dL 0.21  High   <0.01 g/dL Final 02/13/2019  3:42 PM 59       Side Effects: no side effect    Medications:  Current Outpatient Medications   Medication Sig Dispense Refill     albuterol (VENTOLIN HFA) 108 (90 Base) MCG/ACT inhaler Inhale 2 puffs into the lungs every 4 hours as needed for shortness of breath / dyspnea or wheezing 18 g 11     baclofen (LIORESAL) 10 MG tablet TAKE 1/2 TO 1 TABLET BY MOUTH UP TO THREE TIMES A DAY 90 tablet 3     buPROPion (WELLBUTRIN SR) 150 MG 12 hr tablet Take once per day for 3 days and then increase to twice per day 60 tablet 12     cholestyramine (QUESTRAN) 4 g packet Take 1 packet (4 g) by mouth 2 times daily (with meals) 60 each 11     desonide (DESOWEN) 0.05 % external lotion Apply topically as needed 118 mL 1     furosemide (LASIX) 20 MG tablet Take 1 tablet (20 mg) by mouth every morning 90 tablet 3     gabapentin (NEURONTIN) 300 MG capsule Take 2 capsules (600 mg) by mouth 3 times daily 180 capsule 11     loratadine (CLARITIN) 10 MG tablet Take 1 tablet (10 mg) by mouth daily as needed for allergies 30 tablet 6     mometasone-formoterol (DULERA) 200-5 MCG/ACT oral inhaler Inhale 2 puffs into the lungs 2 times daily        order for DME Equipment being ordered: bed pull up bar 1 Units 0     order for DME Equipment being ordered: shower chair 1 Device 0     order for DME Equipment being ordered: Toilet seat riser 1 Device 0     pantoprazole (PROTONIX) 40 MG EC tablet Take 1 tablet (40 mg) by mouth daily 90 tablet 3     POTASSIUM CHLORIDE PO        predniSONE (DELTASONE) 20 MG tablet Take 3 tabs by mouth daily x 3 days, then 2 tabs daily x 3 days, then 1 tab daily x 3 days, then 1/2 tab daily x 3 days. 20 tablet 0     pregabalin (LYRICA) 75 MG capsule Take 1 capsule (75 mg) by mouth 2 times daily 60 capsule 5     propranolol (INDERAL) 20 MG tablet Take 1 tablet (20 mg) by mouth 2 times daily 180 tablet 3     rifaximin  (XIFAXAN) 550 MG TABS tablet Take 1 tablet (550 mg) by mouth 2 times daily 180 tablet 3     SODIUM BICARBONATE PO Take 650 mg by mouth daily as needed        spironolactone (ALDACTONE) 25 MG tablet Take 1 tablet (25 mg) by mouth daily 90 tablet 3     tiotropium (SPIRIVA) 18 MCG capsule Inhale 1 capsule into the lungs daily Using PRN       traZODone (DESYREL) 50 MG tablet Take 1 tablet (50 mg) by mouth At Bedtime 90 tablet 3     vitamin B complex with vitamin C (STRESS TAB) tablet Take 1 tablet by mouth daily 90 tablet 3     vitamin B1 (THIAMINE) 100 MG tablet Take 1 tablet (100 mg) by mouth daily 90 tablet 3     order for DME Equipment being ordered: 2 pairs thigh high compression stockings, strength per patient preference (Patient not taking: Reported on 9/10/2019) 2 Units 1     order for DME Equipment being ordered: Compression Stockings TWO (2) Pairs, 15-20 mm Hg. (Patient not taking: Reported on 9/10/2019) 2 Package prn     order for DME Equipment being ordered: Manual wheelchair.  Estimated duration- 3 months (Patient not taking: Reported on 9/10/2019) 1 Units 0     order for DME Equipment being ordered: Wheelchair (Patient not taking: Reported on 9/10/2019) 1 each 0       Medical History: any changes in medical history since they were last seen? Yes - as noted above    Review of Systems:  The 14 system ROS was reviewed from the intake questionnaire, and is positive for: dizziness, hearing loss, tinnitus, allergies, cancer, cough, wheezing, shortness of breath, fainting, joint pain, stiffness, back pain  Any bowel or bladder problems: denies changes  Mood: good    Physical Exam:  BP (!) 144/70   Pulse 76   Wt 81.2 kg (179 lb)   BMI 28.89 kg/m    Constitutional: alert and no distress.  Pt is obese  Head: Normocephalic. No masses, lesions, tenderness or abnormalities  ENT: EOMI, mucosal surfaces moist.  Neck with full ROM, posture fair  Cardiovascular: No edema or JVD appreciated.  Respiratory: Good  diaphragmatic excursion. No wheezes appreciated.  Speaking in complete sentences without shortness of breath.  No accessory muscle use.   Musculoskeletal: extremities normal- no gross deformities noted, normal muscle tone and able to move about the exam room without difficulty.    Skin: no suspicious lesions or rashes appreciated on exposed areas  Neurologic: Gait intact, mildly unsteady on turns, able to stand on toes and heels. Moving all extremities spontaneously, no apparent weakness.    Psychiatric: mentation appears normal, affect full and good eye contact.      Cervical Spine:  Tender paraspinal muscles, full ROM, facet loading negative  Lumbar Spine:  Tender lumbar paraspinal muscles and SI joints bilaterally, flexes well with pulling in the lower back, facet loading negative, SLR negative    Component Value Flag Ref Range Units Status Collected Lab   Sodium 133   133 - 144 mmol/L Final 08/28/2019 12:05 PM 59   Potassium 4.2   3.4 - 5.3 mmol/L Final 08/28/2019 12:05 PM 59   Chloride 102   94 - 109 mmol/L Final 08/28/2019 12:05 PM 59   Carbon Dioxide 21   20 - 32 mmol/L Final 08/28/2019 12:05 PM 59   Anion Gap 10   3 - 14 mmol/L Final 08/28/2019 12:05 PM 59   Glucose 80   70 - 99 mg/dL Final 08/28/2019 12:05 PM 59   Urea Nitrogen 22   7 - 30 mg/dL Final 08/28/2019 12:05 PM 59   Creatinine 1.93  High   0.66 - 1.25 mg/dL Final 08/28/2019 12:05 PM 59   GFR Estimate 37  Low   >60 mL/min/ Final 08/28/2019 12:05 PM 59   Comment:   Non  GFR Calc   Starting 12/18/2018, serum creatinine based estimated GFR (eGFR) will be   calculated using the Chronic Kidney Disease Epidemiology Collaboration   (CKD-EPI) equation.    GFR Estimate If Black 42  Low   >60 mL/min/ Final 08/28/2019 12:05 PM 59   Comment:    GFR Calc   Starting 12/18/2018, serum creatinine based estimated GFR (eGFR) will be   calculated using the Chronic Kidney Disease Epidemiology Collaboration   (CKD-EPI) equation.     Calcium 9.3   8.5 - 10.1 mg/dL Final 08/28/2019 12:05 PM 59     Component Value Flag Ref Range Units Status Collected Lab   WBC 11.3  High   4.0 - 11.0 10e9/L Final 08/28/2019 12:05 PM 59   RBC Count 3.40  Low   4.4 - 5.9 10e12/L Final 08/28/2019 12:05 PM 59   Hemoglobin 8.1  Low   13.3 - 17.7 g/dL Final 08/28/2019 12:05 PM 59   Hematocrit 26.3  Low   40.0 - 53.0 % Final 08/28/2019 12:05 PM 59   MCV 77  Low   78 - 100 fl Final 08/28/2019 12:05 PM 59   MCH 23.8  Low   26.5 - 33.0 pg Final 08/28/2019 12:05 PM 59   MCHC 30.8  Low   31.5 - 36.5 g/dL Final 08/28/2019 12:05 PM 59   RDW 23.2  High   10.0 - 15.0 % Final 08/28/2019 12:05 PM 59   Platelet Count 98  Low   150 - 450 10e9/L Final 08/28/2019 12:05 PM 59   MR LUMBAR SPINE WITHOUT CONTRAST March 9, 2019 11:39 FINDINGS: Five functional lumbar vertebral segments were documented on  the previous CT exam. The caudal thecal sac contents appear  intrinsically normal. Subtle degenerative retrolisthesis T12 on L1 and  L1 on L2 and degenerative anterolisthesis of L4 on L5, all without  change. Alignment in the sagittal plane is otherwise normal. Chronic  mild wedge compression deformity of L1 without change. Underlying  endplate irregularity at the thoracolumbar junction may indicate old  Scheuermann's disease. Schmorl's node involving the inferior endplate  of T11. No acute bony abnormality.     T11-T12: No disc bulge/protrusion or central or foraminal stenosis.     T12-L1: Posterior disc space narrowing, mild disc bulging and no disc  protrusion or significant central or foraminal stenosis. Posterior  facets are unremarkable.     L1-L2: Mild disc space narrowing posteriorly with minimal disc bulging  and no disc protrusion or significant central or foraminal stenosis.  Posterior facets are normal. No change.     L2-L3: Signal loss, posterior disc bulging and no acute disc  protrusion. Mild central stenosis related to multiple factors. Minimal  bilateral foraminal  narrowing. Posterior facets are unremarkable. No  change.     L3-L4: Signal loss without disc space narrowing. Minimal disc bulging  and no disc protrusion or central stenosis. Very mild bilateral  foraminal narrowing and no significant posterior facet degenerative  changes. No change.     L4-L5: Signal loss, minimal disc bulging. Moderate posterior facet  degenerative changes with subtle anterolisthesis. No acute disc  protrusion. Very mild central stenosis. Moderate left and mild right  foraminal stenosis. No change.     L5-S1: Signal loss with mild disc bulge which is asymmetric  posterolaterally on the left, potentially representing a small  broad-based left central disc protrusion. This may be associated with  a small annular fissure. There is some mild indentation on the left  anterolateral thecal sac but there is no central stenosis. No  significant foraminal stenosis. Minimal posterior facet degenerative  changes. No change.     Paraspinal soft tissues are unremarkable.                                                                    IMPRESSION:   1. No significant change since prior CT exam of 2/25/2019.  2. Multilevel early degenerative disc disease. Mild central stenosis  at L2-L3 related to multiple factors.  3. Mild foraminal stenosis at multiple levels bilaterally as described  above.  4. Chronic wedge compression deformity L1.     AKIN DORANTES MD    MR CERVICAL SPINE WITHOUT CONTRAST March 9, 2019 11:11 FINDINGS: The cervical and upper thoracic spinal cord as well as the  cervical medullary junction all appear intrinsically normal. No  significant alignment abnormalities. Benign peridiscal degenerative  signal change at multiple levels. No acute bony abnormality.     C2-C3: Normal disc space. Posterior facet degenerative changes on the  left. No significant central or foraminal stenosis. No change.     C3-C4: Chronic advanced degenerative disc space narrowing with disc  bulging and endplate  spurring partially effacing the anterior  subarachnoid space without significant overall central stenosis. At  least mild bilateral foraminal stenosis is probably unchanged but  difficult to assess due to motion artifact. No significant posterior  facet degenerative changes. No change.     C4-C5: Chronic advanced degenerative disc space narrowing with disc  bulging and endplate spurring mildly indenting the anterior thecal sac  with mild overall central stenosis and no deformity of the cord.  Foramina are difficult evaluate due to motion artifacts but there is  probably mild bilateral foraminal stenosis. Minimal, if any posterior  facet degenerative changes. No change.     C5-C6: Chronic advanced degenerative disc space narrowing with disc  bulging and endplate spurring and no acute disc protrusion. Mild  central stenosis. Foramina are difficult to evaluate because of motion  artifacts. Probable moderate bilateral foraminal stenosis without  change. No significant posterior facet degenerative changes. No  change.     C6-C7: Moderate degenerative disc space narrowing with minimal disc  bulging and no disc protrusion or central stenosis. Foramina are  difficult evaluate because of motion artifacts. Probable mild  posterior facet degenerative changes. No change.     C7-T1: Normal disc space. Moderate posterior facet degenerative change  on right. No central or foraminal stenosis. No change.     T1-T2: Normal.     Paraspinal soft tissues appear normal to the extent visualized.                                                                    IMPRESSION: Multilevel advanced degenerative disc disease from the C3  through the C7 level with mild central stenosis at some levels as  described above and without significant change since the prior exam.  Multilevel foraminal stenosis is probably unchanged since the prior  exam but difficult to evaluate on the current study due to motion  artifacts.     AKIN DORANTES,  MD      Assessment:   1.  Chronic pain syndrome  2.  Chronic neck pain  3.  Chronic lower back pain  4.  Right hip bursitis  5.  Myofascial pain (muscle pain)  6.  Alcohol use/abuse  7.  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  Sacroiliac joint pain  9.  AML in remission  10.  Peripheral neuropathy    Abhijeet Mccauley is a 58 year old male who is seen at the pain clinic for chronic neck and back pain.  He had some increased pain after a fall off of his deck, but this has improved.  He was also hospitalized for treatment of complications of alcoholic cirrhosis an treatment of what was thought to be inflammatory polyneuropathy.  He is not interested in procedures at this time and feels that he is improving with current treatment.  Treatment plan is as outlined below.    Plan:  1. Physical Therapy:  [facility] physical therapy eval and treat ordered again today  2. Clinical Health Psychologist to address issues of relaxation, behavioral change, coping style, and other factors important to improvement.  Deferred - states will not attend  3. Diagnostic Studies:  Reviewed imaging  4. Medication Management:    1. Continue Gabapentin 600 mg TID   2. Continue Lyrica 75 mg BID  3. Continue Baclofen 10 mg TID prn  4. Continue Wellbutrin  mg BID  5. Will certify for Medical cannabis today - patient warned about additive effects of THC with alcohol  5. Further procedures recommended:   1. Consider cervical epidural steroid injection - would need to check INR due to history of alcoholic cirrhosis  2. May benefit from cervical and/or lumbar facet joint procedures  3. May benefit from sacroiliac joint injections  6. Recommendations to PCP: continue current treatment  7. Patient to follow up with Primary Care provider regarding elevated blood pressure.  8. Smoking cessation and smoking effects on healing and chronic pain discussed with patient   9. Follow up: regular follow up in the pain clinic is not necessary at this  point in time.  Continue to follow up with your primary care provider for medications and orders for procedures can be placed by Primary care provider if needed    Total time spent was 30 minutes, and more than 50% of face to face time was spent in counseling and/or coordination of care regarding diagnosis, physical activity, medication management, and interventional procedures.    Landen Quiñones MD  [facility]

## 2019-09-10 NOTE — PATIENT INSTRUCTIONS
Assessment:   1.  Chronic pain syndrome  2.  Chronic neck pain  3.  Chronic lower back pain  4.  Right hip bursitis  5.  Myofascial pain (muscle pain)  6.  Alcohol use/abuse  7.  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  Sacroiliac joint pain  9.  AML in remission  10.  Peripheral neuropathy    Plan:  1. Physical Therapy:  [facility] physical therapy eval and treat ordered again today  2. Clinical Health Psychologist to address issues of relaxation, behavioral change, coping style, and other factors important to improvement.  Deferred - states will not attend  3. Diagnostic Studies:  Reviewed imaging  4. Medication Management:    1. Continue Gabapentin 600 mg TID   2. Continue Lyrica 75 mg BID  3. Continue Baclofen 10 mg TID prn  4. Continue Wellbutrin  mg BID  5. Will certify for Medical cannabis today - patient warned about additive effects of THC with alcohol  5. Further procedures recommended:   1. Consider cervical epidural steroid injection - would need to check INR due to history of alcoholic cirrhosis  2. May benefit from cervical and/or lumbar facet joint procedures  3. May benefit from sacroiliac joint injections  6. Recommendations to PCP: continue current treatment  7. Patient to follow up with Primary Care provider regarding elevated blood pressure.  8. Smoking cessation and smoking effects on healing and chronic pain discussed with patient   9. Follow up: regular follow up in the pain clinic is not necessary at this point in time.  Continue to follow up with your primary care provider for medications and orders for procedures can be placed by Primary care provider if needed      ----------------------------------------------------------------  Clinic Number:  [phone]     Call with any questions about your care and for scheduling assistance.     Calls are returned Monday through Friday between 8 AM and 4:30 PM. We usually get back to you within 2 business days depending on the  issue/request.    If we are prescribing your medications:    For opioid medication refills, call the clinic or send a FreakOut message 7 days in advance.  Please include:    Name of requested medication    Name of the pharmacy.    For non-opioid medications, call your pharmacy directly to request a refill. Please allow 3-4 days to be processed.     Per [place] State Law:    All controlled substance prescriptions must be filled within 30 days of being written.      For those controlled substances allowing refills, pickup must occur within 30 days of last fill.      We believe regular attendance is key to your success in our program!      Any time you are unable to keep your appointment we ask that you call us at least 24 hours in advance to cancel.This will allow us to offer the appointment time to another patient.     Multiple missed appointments may lead to dismissal from the clinic.

## 2019-09-27 NOTE — PROGRESS NOTES
Subjective     Abhijeet Mccauley is a 58 year old male who presents to clinic today for the following health issues:  Chief Complaint   Patient presents with     RECHECK     follow up     Back Pain     x 1 month, right flank pain     Imm/Inj     Flu Shot     HPI     I saw Abhijeet last on August 28.  He had a recent fall on his deck that happened after he experienced dizziness with standing up from bending over and his vital signs suggested orthostatic hypotension, so we decrease his spironolactone from 50 mg to 25 mg daily to see if that would help with his symptoms.  His anemia was improved at that visit but he had not yet followed up with GI or hematology, so recommended that he schedule these.   called him offering assistance for scheduling, but he declined.    He reports he continues to have dizziness with bending over, but it did improve.  However, his swelling has worsened.      He was recently certified for medical cannabis by his pain doctor earlier this month.     Right flank pain      Duration: x 1 month    Description (location/character/radiation): Patient reports having a pain in the left back/flank, pain is constant.  Has to sit and rest a lot due to the pain, can work for 15 minutes then has to rest for 30 minutes.  Pain is both stabbing and burning.  Can tingle when pain is worse.      Intensity:  severe    Accompanying signs and symptoms: Small spots of blood in the stool still- has not yet scheduled with GI.  Urine appears creamy.  No dysuria, hematuria, urgency, frequency.     History (similar episodes/previous evaluation): None    Precipitating or alleviating factors: sometimes walking, bending over.  Pain is worse with pressing in the area.       Therapies tried and outcome: Tylenol      Has noticed some change in his voice recently.  He has noticed some heartburn but doesn't feel the voice change is related.       Has noticed increased thirst recently.        Patient Active Problem  List   Diagnosis     Essential hypertension     Acute myeloid leukemia in remission (H)     Sensorineural hearing loss, asymmetrical     Alcoholic cirrhosis of liver (H)     Acute alcoholic hepatitis     Coagulation defect (H)     Osteoarthrosis     Thrombocytopenia (H)     Universal ulcerative (chronic) colitis(556.6) (H)     CKD (chronic kidney disease) stage 3, GFR 30-59 ml/min (H)     Rosacea     Mild intermittent asthma without complication     Peptic ulcer disease     Uncomplicated alcohol dependence (H)     Tobacco dependence syndrome     Tubular adenoma of colon     Normocytic anemia     Leukocytosis with increased monocytes     Generalized weakness     COPD exacerbation (H)     AIDP (acute inflammatory demyelinating polyneuropathy) (H)     Past Surgical History:   Procedure Laterality Date     AS TOTAL KNEE ARTHROPLASTY Left      BONE MARROW BIOPSY, BONE SPECIMEN, NEEDLE/TROCAR N/A 6/12/2018    Procedure: BIOPSY BONE MARROW;  Bone Marrow Biopsy;  Surgeon: Demar Sapp MD;  Location: [facility]     ESOPHAGOSCOPY, GASTROSCOPY, DUODENOSCOPY (EGD), COMBINED N/A 2/8/2018    Procedure: COMBINED ESOPHAGOSCOPY, GASTROSCOPY, DUODENOSCOPY (EGD), BIOPSY SINGLE OR MULTIPLE;  gastroscopy with biopsies;  Surgeon: Raz Cobb MD;  Location: [facility]     ESOPHAGOSCOPY, GASTROSCOPY, DUODENOSCOPY (EGD), COMBINED N/A 3/18/2018    Procedure: COMBINED ESOPHAGOSCOPY, GASTROSCOPY, DUODENOSCOPY (EGD);;  Surgeon: Raz Cobb MD;  Location: [facility]     LACERATION REPAIR Right     Right leg     PHACOEMULSIFICATION WITH STANDARD INTRAOCULAR LENS IMPLANT Left 7/1/2019    Procedure: cataract removal with implant.;  Surgeon: Adrian Oliveira MD;  Location: [facility]     PHACOEMULSIFICATION WITH STANDARD INTRAOCULAR LENS IMPLANT Right 7/29/2019    Procedure: Cataract removal with implant.;  Surgeon: Adrian Oliveira MD;  Location: [facility]       Social History     Tobacco Use     Smoking status: Current Every Day  Smoker     Packs/day: 0.50     Years: 30.00     Pack years: 15.00     Types: Cigarettes     Smokeless tobacco: Never Used     Tobacco comment: I strongly emphasized the importance of quitting smoking. 4-5 daily   Substance Use Topics     Alcohol use: Yes     Comment: Down to 3 beers per day.  Sometimes a cocktail also.     Family History   Problem Relation Age of Onset     Hypertension Mother      Coronary Artery Disease Father         MI         Current Outpatient Medications   Medication Sig Dispense Refill     baclofen (LIORESAL) 10 MG tablet TAKE 1/2 TO 1 TABLET BY MOUTH UP TO THREE TIMES A DAY 90 tablet 3     buPROPion (WELLBUTRIN SR) 150 MG 12 hr tablet Take once per day for 3 days and then increase to twice per day 60 tablet 12     furosemide (LASIX) 20 MG tablet Take 1 tablet (20 mg) by mouth every morning 90 tablet 3     gabapentin (NEURONTIN) 300 MG capsule Take 2 capsules (600 mg) by mouth 3 times daily 180 capsule 11     loratadine (CLARITIN) 10 MG tablet Take 1 tablet (10 mg) by mouth daily as needed for allergies 30 tablet 11     pantoprazole (PROTONIX) 40 MG EC tablet Take 1 tablet (40 mg) by mouth daily 90 tablet 3     POTASSIUM CHLORIDE PO        propranolol (INDERAL) 20 MG tablet Take 1 tablet (20 mg) by mouth 2 times daily 180 tablet 3     rifaximin (XIFAXAN) 550 MG TABS tablet Take 1 tablet (550 mg) by mouth 2 times daily 180 tablet 3     spironolactone (ALDACTONE) 25 MG tablet Take 1 tablet (25 mg) by mouth daily 90 tablet 3     sulfamethoxazole-trimethoprim (BACTRIM DS/SEPTRA DS) 800-160 MG tablet Take 1 tablet by mouth 2 times daily for 7 days 14 tablet 0     albuterol (VENTOLIN HFA) 108 (90 Base) MCG/ACT inhaler Inhale 2 puffs into the lungs every 4 hours as needed for shortness of breath / dyspnea or wheezing 18 g 11     cholestyramine (QUESTRAN) 4 g packet Take 1 packet (4 g) by mouth 2 times daily (with meals) 60 each 11     desonide (DESOWEN) 0.05 % external lotion Apply topically as  needed 118 mL 1     mometasone-formoterol (DULERA) 200-5 MCG/ACT oral inhaler Inhale 2 puffs into the lungs 2 times daily        order for DME Equipment being ordered: 2 pairs thigh high compression stockings, strength per patient preference (Patient not taking: Reported on 9/10/2019) 2 Units 1     order for DME Equipment being ordered: Compression Stockings TWO (2) Pairs, 15-20 mm Hg. (Patient not taking: Reported on 9/10/2019) 2 Package prn     order for DME Equipment being ordered: Manual wheelchair.  Estimated duration- 3 months (Patient not taking: Reported on 9/10/2019) 1 Units 0     order for DME Equipment being ordered: bed pull up bar 1 Units 0     order for DME Equipment being ordered: Wheelchair (Patient not taking: Reported on 9/10/2019) 1 each 0     order for DME Equipment being ordered: shower chair 1 Device 0     order for DME Equipment being ordered: Toilet seat riser 1 Device 0     predniSONE (DELTASONE) 20 MG tablet Take 3 tabs by mouth daily x 3 days, then 2 tabs daily x 3 days, then 1 tab daily x 3 days, then 1/2 tab daily x 3 days. 20 tablet 0     SODIUM BICARBONATE PO Take 650 mg by mouth daily as needed        tiotropium (SPIRIVA) 18 MCG capsule Inhale 1 capsule into the lungs daily Using PRN       traZODone (DESYREL) 50 MG tablet Take 1 tablet (50 mg) by mouth At Bedtime 90 tablet 3     vitamin B complex with vitamin C (STRESS TAB) tablet Take 1 tablet by mouth daily (Patient not taking: Reported on 9/30/2019) 90 tablet 3     vitamin B1 (THIAMINE) 100 MG tablet Take 1 tablet (100 mg) by mouth daily (Patient not taking: Reported on 9/30/2019) 90 tablet 3     Allergies   Allergen Reactions     Blood Transfusion Related (Informational Only)      Patient has a history of a clinically significant antibody against RBC antigens.  A delay in compatible RBCs may occur.     Famotidine Unknown and Other (See Comments)     Severe abdominal cramps     Cyclobenzaprine Hives     Methocarbamol Other (See  "Comments)     Made pt's \"face break out\"     Pepcid Cramps     Severe abdominal cramps     Vancomycin Other (See Comments)     hallucinations     Vfend Other (See Comments)     IV - cold sweats, Iron tablet makes him nauseated and stomach ached     Hydrocodone-Acetaminophen Rash       Reviewed and updated as needed this visit by Provider         Review of Systems   ROS COMP: Constitutional, gi and gu systems are negative, except as otherwise noted.      Objective    /62 (BP Location: Right arm, Patient Position: Sitting, Cuff Size: Adult Regular)   Pulse 71   Temp 97.8  F (36.6  C) (Tympanic)   Wt 85.1 kg (187 lb 9.6 oz)   SpO2 96%   BMI 30.28 kg/m    Body mass index is 30.28 kg/m .  Physical Exam     GENERAL: healthy, alert and no distress  RESP: lungs clear to auscultation - no rales, rhonchi or wheezes  CV: regular rate and rhythm, normal S1 S2, no S3 or S4, no murmur, click or rub, moderate pitting edema in the legs.  ABDOMEN: soft, non-tender  BACK: Pain to palpation of the left lower thoracic back    Diagnostic Test Results:  Labs reviewed in Epic  Results for orders placed or performed in visit on 09/30/19 (from the past 24 hour(s))   Comprehensive metabolic panel   Result Value Ref Range    Sodium 135 133 - 144 mmol/L    Potassium 4.4 3.4 - 5.3 mmol/L    Chloride 105 94 - 109 mmol/L    Carbon Dioxide 21 20 - 32 mmol/L    Anion Gap 9 3 - 14 mmol/L    Glucose 82 70 - 99 mg/dL    Urea Nitrogen 28 7 - 30 mg/dL    Creatinine 2.14 (H) 0.66 - 1.25 mg/dL    GFR Estimate 32 (L) >60 mL/min/[1.73_m2]    GFR Estimate If Black 37 (L) >60 mL/min/[1.73_m2]    Calcium 9.0 8.5 - 10.1 mg/dL    Bilirubin Total 1.1 0.2 - 1.3 mg/dL    Albumin 3.2 (L) 3.4 - 5.0 g/dL    Protein Total 6.9 6.8 - 8.8 g/dL    Alkaline Phosphatase 280 (H) 40 - 150 U/L    ALT 21 0 - 70 U/L    AST 34 0 - 45 U/L   WBC with Diff   Result Value Ref Range    WBC Canceled, Test credited 4.0 - 11.0 10e9/L    Diff Method Canceled, Test credited     % " Neutrophils Canceled, Test credited %    % Lymphocytes Canceled, Test credited %    % Monocytes Canceled, Test credited %    % Eosinophils Canceled, Test credited %    % Basophils Canceled, Test credited %    % Immature Granulocytes Canceled, Test credited %    Nucleated RBCs Canceled, Test credited 0 /100    Absolute Neutrophil Canceled, Test credited 1.6 - 8.3 10e9/L    Absolute Lymphocytes Canceled, Test credited 0.8 - 5.3 10e9/L    Absolute Monocytes Canceled, Test credited 0.0 - 1.3 10e9/L    Absolute Eosinophils Canceled, Test credited 0.0 - 0.7 10e9/L    Absolute Basophils Canceled, Test credited 0.0 - 0.2 10e9/L    Abs Immature Granulocytes Canceled, Test credited 0 - 0.4 10e9/L    Absolute Nucleated RBC Canceled, Test credited     Anisocytosis Canceled, Test credited     Elliptocytes Canceled, Test credited     Platelet Estimate Canceled, Test credited    CBC with platelets and differential   Result Value Ref Range    WBC 8.1 4.0 - 11.0 10e9/L    RBC Count 2.95 (L) 4.4 - 5.9 10e12/L    Hemoglobin 7.2 (L) 13.3 - 17.7 g/dL    Hematocrit 24.9 (L) 40.0 - 53.0 %    MCV 84 78 - 100 fl    MCH 24.4 (L) 26.5 - 33.0 pg    MCHC 28.9 (L) 31.5 - 36.5 g/dL    RDW 23.8 (H) 10.0 - 15.0 %    Platelet Count 230 150 - 450 10e9/L    Diff Method Automated Method     % Neutrophils 48.4 %    % Lymphocytes 22.2 %    % Monocytes 23.0 %    % Eosinophils 4.0 %    % Basophils 0.7 %    % Immature Granulocytes 1.7 %    Nucleated RBCs 0 0 /100    Absolute Neutrophil 3.9 1.6 - 8.3 10e9/L    Absolute Lymphocytes 1.8 0.8 - 5.3 10e9/L    Absolute Monocytes 1.9 (H) 0.0 - 1.3 10e9/L    Absolute Eosinophils 0.3 0.0 - 0.7 10e9/L    Absolute Basophils 0.1 0.0 - 0.2 10e9/L    Abs Immature Granulocytes 0.1 0 - 0.4 10e9/L    Absolute Nucleated RBC 0.0     Platelet Estimate       Automated count confirmed.  Platelet morphology is normal.   *UA reflex to Microscopic and Culture ([facility] (except [place] and [place])   Result  Value Ref Range    Color Urine Yellow     Appearance Urine Cloudy     Glucose Urine Negative NEG^Negative mg/dL    Bilirubin Urine Negative NEG^Negative    Ketones Urine Negative NEG^Negative mg/dL    Specific Gravity Urine <=1.005 1.003 - 1.035    Blood Urine Moderate (A) NEG^Negative    pH Urine 6.0 5.0 - 7.0 pH    Protein Albumin Urine Negative NEG^Negative mg/dL    Urobilinogen Urine 0.2 0.2 - 1.0 EU/dL    Nitrite Urine Negative NEG^Negative    Leukocyte Esterase Urine Moderate (A) NEG^Negative    Source Midstream Urine    Urine Microscopic   Result Value Ref Range    WBC Urine >100 (A) OTO5^0 - 5 /HPF    RBC Urine 10-25 (A) OTO2^O - 2 /HPF           Assessment & Plan     1. Flank pain    Abhijeet presents with 1 month of left flank pain.  Pain is reproducible on palpation suggesting a possible MSK etiology, however his UA is also suggestive of infection.  We will start a course of Bactrim as below.  His pain is fairly constant, so seems less likely to be renal stone, but if symptoms are not improving I would recommend further evaluation for this.  Advised him to schedule CT scan for further evaluation if symptoms do not resolve with antibiotics.      - *UA reflex to Microscopic and Culture ([facility] (except [place] and [place])  - Comprehensive metabolic panel  - CT Abdomen Pelvis w/o Contrast; Future  - CBC with platelets and differential; Future  - CBC with platelets and differential    2. Acute cystitis without hematuria    - sulfamethoxazole-trimethoprim (BACTRIM DS/SEPTRA DS) 800-160 MG tablet; Take 1 tablet by mouth 2 times daily for 7 days  Dispense: 14 tablet; Refill: 0    3. Nonspecific finding on examination of urine    - Urine Culture Aerobic Bacterial    4. Normocytic anemia    Hemoglobin has again drifted down to 7.2.  He has not yet scheduled follow-up with GI or hematology, but he is now open to doing so.  I message the  who will contact him later this week to help  him set these up.  Advised him to follow-up for hemoglobin recheck in 1 week as we may need to transfuse again if he drops below 7.    - Hemoglobin; Future    5. Voice hoarseness    Recommended seeing ENT about hoarse voice.  Discussed that sometimes this can be due to acid reflux, but he does not feel that this is the case.    - OTOLARYNGOLOGY REFERRAL    6. Seasonal allergic rhinitis due to pollen    Refills provided    - loratadine (CLARITIN) 10 MG tablet; Take 1 tablet (10 mg) by mouth daily as needed for allergies  Dispense: 30 tablet; Refill: 11    7.  Bilateral leg edema    His edema has worsened since decreasing the spironolactone, but his dizziness has improved.  Recommended continuing his medication  at the current doses and starting compression stockings to help with the edema.    8. Need for prophylactic vaccination and inoculation against influenza    - INFLUENZA QUAD, RECOMBINANT, P-FREE (RIV4) (FLUBLOCK) [01220]  - Vaccine Administration, Initial [20821]  - Urine Microscopic       Return in about 1 week (around 10/7/2019) for Lab Work.    Chidi Valenzuela MD  [facility]    Chart documentation was done using Dragon dictation software. Although reviewed after completion, some errors may remain.

## 2019-09-30 ENCOUNTER — OFFICE VISIT (OUTPATIENT)
Dept: FAMILY MEDICINE | Facility: CLINIC | Age: 59
End: 2019-09-30
Payer: COMMERCIAL

## 2019-09-30 VITALS
SYSTOLIC BLOOD PRESSURE: 138 MMHG | DIASTOLIC BLOOD PRESSURE: 62 MMHG | HEART RATE: 71 BPM | WEIGHT: 187.6 LBS | BODY MASS INDEX: 30.28 KG/M2 | OXYGEN SATURATION: 96 % | TEMPERATURE: 97.8 F

## 2019-09-30 DIAGNOSIS — Z23 NEED FOR PROPHYLACTIC VACCINATION AND INOCULATION AGAINST INFLUENZA: ICD-10-CM

## 2019-09-30 DIAGNOSIS — N30.00 ACUTE CYSTITIS WITHOUT HEMATURIA: ICD-10-CM

## 2019-09-30 DIAGNOSIS — J30.1 SEASONAL ALLERGIC RHINITIS DUE TO POLLEN: ICD-10-CM

## 2019-09-30 DIAGNOSIS — R60.0 BILATERAL LEG EDEMA: ICD-10-CM

## 2019-09-30 DIAGNOSIS — R49.0 VOICE HOARSENESS: ICD-10-CM

## 2019-09-30 DIAGNOSIS — R82.90 NONSPECIFIC FINDING ON EXAMINATION OF URINE: ICD-10-CM

## 2019-09-30 DIAGNOSIS — D64.9 NORMOCYTIC ANEMIA: ICD-10-CM

## 2019-09-30 DIAGNOSIS — R10.9 FLANK PAIN: Primary | ICD-10-CM

## 2019-09-30 LAB
ALBUMIN SERPL-MCNC: 3.2 G/DL (ref 3.4–5)
ALBUMIN UR-MCNC: NEGATIVE MG/DL
ALP SERPL-CCNC: 280 U/L (ref 40–150)
ALT SERPL W P-5'-P-CCNC: 21 U/L (ref 0–70)
ANION GAP SERPL CALCULATED.3IONS-SCNC: 9 MMOL/L (ref 3–14)
ANISOCYTOSIS BLD QL SMEAR: NORMAL
APPEARANCE UR: ABNORMAL
AST SERPL W P-5'-P-CCNC: 34 U/L (ref 0–45)
BASOPHILS # BLD AUTO: 0.1 10E9/L (ref 0–0.2)
BASOPHILS # BLD AUTO: NORMAL 10E9/L (ref 0–0.2)
BASOPHILS NFR BLD AUTO: 0.7 %
BASOPHILS NFR BLD AUTO: NORMAL %
BILIRUB SERPL-MCNC: 1.1 MG/DL (ref 0.2–1.3)
BILIRUB UR QL STRIP: NEGATIVE
BUN SERPL-MCNC: 28 MG/DL (ref 7–30)
CALCIUM SERPL-MCNC: 9 MG/DL (ref 8.5–10.1)
CHLORIDE SERPL-SCNC: 105 MMOL/L (ref 94–109)
CO2 SERPL-SCNC: 21 MMOL/L (ref 20–32)
COLOR UR AUTO: YELLOW
CREAT SERPL-MCNC: 2.14 MG/DL (ref 0.66–1.25)
DIFFERENTIAL METHOD BLD: ABNORMAL
DIFFERENTIAL METHOD BLD: NORMAL
ELLIPTOCYTES BLD QL SMEAR: NORMAL
EOSINOPHIL # BLD AUTO: 0.3 10E9/L (ref 0–0.7)
EOSINOPHIL # BLD AUTO: NORMAL 10E9/L (ref 0–0.7)
EOSINOPHIL NFR BLD AUTO: 4 %
EOSINOPHIL NFR BLD AUTO: NORMAL %
ERYTHROCYTE [DISTWIDTH] IN BLOOD BY AUTOMATED COUNT: 23.8 % (ref 10–15)
GFR SERPL CREATININE-BSD FRML MDRD: 32 ML/MIN/{1.73_M2}
GLUCOSE SERPL-MCNC: 82 MG/DL (ref 70–99)
GLUCOSE UR STRIP-MCNC: NEGATIVE MG/DL
HCT VFR BLD AUTO: 24.9 % (ref 40–53)
HGB BLD-MCNC: 7.2 G/DL (ref 13.3–17.7)
HGB UR QL STRIP: ABNORMAL
IMM GRANULOCYTES # BLD: 0.1 10E9/L (ref 0–0.4)
IMM GRANULOCYTES # BLD: NORMAL 10E9/L (ref 0–0.4)
IMM GRANULOCYTES NFR BLD: 1.7 %
IMM GRANULOCYTES NFR BLD: NORMAL %
KETONES UR STRIP-MCNC: NEGATIVE MG/DL
LEUKOCYTE ESTERASE UR QL STRIP: ABNORMAL
LYMPHOCYTES # BLD AUTO: 1.8 10E9/L (ref 0.8–5.3)
LYMPHOCYTES # BLD AUTO: NORMAL 10E9/L (ref 0.8–5.3)
LYMPHOCYTES NFR BLD AUTO: 22.2 %
LYMPHOCYTES NFR BLD AUTO: NORMAL %
MCH RBC QN AUTO: 24.4 PG (ref 26.5–33)
MCHC RBC AUTO-ENTMCNC: 28.9 G/DL (ref 31.5–36.5)
MCV RBC AUTO: 84 FL (ref 78–100)
MONOCYTES # BLD AUTO: 1.9 10E9/L (ref 0–1.3)
MONOCYTES # BLD AUTO: NORMAL 10E9/L (ref 0–1.3)
MONOCYTES NFR BLD AUTO: 23 %
MONOCYTES NFR BLD AUTO: NORMAL %
NEUTROPHILS # BLD AUTO: 3.9 10E9/L (ref 1.6–8.3)
NEUTROPHILS # BLD AUTO: NORMAL 10E9/L (ref 1.6–8.3)
NEUTROPHILS NFR BLD AUTO: 48.4 %
NEUTROPHILS NFR BLD AUTO: NORMAL %
NITRATE UR QL: NEGATIVE
NRBC # BLD AUTO: 0 10*3/UL
NRBC # BLD AUTO: NORMAL 10*3/UL
NRBC BLD AUTO-RTO: 0 /100
NRBC BLD AUTO-RTO: NORMAL /100
PH UR STRIP: 6 PH (ref 5–7)
PLATELET # BLD AUTO: 230 10E9/L (ref 150–450)
PLATELET # BLD EST: ABNORMAL 10*3/UL
PLATELET # BLD EST: NORMAL 10*3/UL
POTASSIUM SERPL-SCNC: 4.4 MMOL/L (ref 3.4–5.3)
PROT SERPL-MCNC: 6.9 G/DL (ref 6.8–8.8)
RBC # BLD AUTO: 2.95 10E12/L (ref 4.4–5.9)
RBC #/AREA URNS AUTO: ABNORMAL /HPF
SODIUM SERPL-SCNC: 135 MMOL/L (ref 133–144)
SOURCE: ABNORMAL
SP GR UR STRIP: <=1.005 (ref 1–1.03)
UROBILINOGEN UR STRIP-ACNC: 0.2 EU/DL (ref 0.2–1)
WBC # BLD AUTO: 8.1 10E9/L (ref 4–11)
WBC # BLD AUTO: NORMAL 10E9/L (ref 4–11)
WBC #/AREA URNS AUTO: >100 /HPF

## 2019-09-30 PROCEDURE — 87086 URINE CULTURE/COLONY COUNT: CPT | Performed by: INTERNAL MEDICINE

## 2019-09-30 PROCEDURE — 81001 URINALYSIS AUTO W/SCOPE: CPT | Performed by: INTERNAL MEDICINE

## 2019-09-30 PROCEDURE — 99214 OFFICE O/P EST MOD 30 MIN: CPT | Mod: 25 | Performed by: INTERNAL MEDICINE

## 2019-09-30 PROCEDURE — 90471 IMMUNIZATION ADMIN: CPT | Performed by: INTERNAL MEDICINE

## 2019-09-30 PROCEDURE — 80053 COMPREHEN METABOLIC PANEL: CPT | Performed by: INTERNAL MEDICINE

## 2019-09-30 PROCEDURE — 85025 COMPLETE CBC W/AUTO DIFF WBC: CPT | Performed by: INTERNAL MEDICINE

## 2019-09-30 PROCEDURE — 36415 COLL VENOUS BLD VENIPUNCTURE: CPT | Performed by: INTERNAL MEDICINE

## 2019-09-30 PROCEDURE — 87186 SC STD MICRODIL/AGAR DIL: CPT | Performed by: INTERNAL MEDICINE

## 2019-09-30 PROCEDURE — 90682 RIV4 VACC RECOMBINANT DNA IM: CPT | Performed by: INTERNAL MEDICINE

## 2019-09-30 PROCEDURE — 87088 URINE BACTERIA CULTURE: CPT | Performed by: INTERNAL MEDICINE

## 2019-09-30 RX ORDER — SULFAMETHOXAZOLE/TRIMETHOPRIM 800-160 MG
1 TABLET ORAL 2 TIMES DAILY
Qty: 14 TABLET | Refills: 0 | Status: SHIPPED | OUTPATIENT
Start: 2019-09-30 | End: 2020-02-19

## 2019-09-30 RX ORDER — LORATADINE 10 MG/1
10 TABLET ORAL DAILY PRN
Qty: 30 TABLET | Refills: 11 | Status: ON HOLD | OUTPATIENT
Start: 2019-09-30 | End: 2020-06-25

## 2019-09-30 NOTE — PATIENT INSTRUCTIONS
Use compression stockings daily to keep your leg swelling down.        Return in 1 week to get your hemoglobin rechecked and get the GI and hematology appointments scheduled.  The  is going to call you this week to help.      I put in a referral to ENT about the hoarse voice.    CT scan of the abdomen was ordered.  Please call [phone] to schedule if your flank pain doesn't improve with the antibiotics.

## 2019-09-30 NOTE — LETTER
September 30, 2019      Abhijeet Mccauley  [address] [place] [id number]        Dear ,    We are writing to inform you of your test results.    Electrolytes are normal, kidney function remains reduced but similar to prior, and liver labs are still slightly abnormal but also similar to before.  Low hemoglobin was discussed in clinic- plan is to recheck next week.     Resulted Orders   *UA reflex to Microscopic and Culture ([facility] (except [place] and [place])   Result Value Ref Range    Color Urine Yellow     Appearance Urine Cloudy     Glucose Urine Negative NEG^Negative mg/dL    Bilirubin Urine Negative NEG^Negative    Ketones Urine Negative NEG^Negative mg/dL    Specific Gravity Urine <=1.005 1.003 - 1.035    Blood Urine Moderate (A) NEG^Negative    pH Urine 6.0 5.0 - 7.0 pH    Protein Albumin Urine Negative NEG^Negative mg/dL    Urobilinogen Urine 0.2 0.2 - 1.0 EU/dL    Nitrite Urine Negative NEG^Negative    Leukocyte Esterase Urine Moderate (A) NEG^Negative    Source Midstream Urine    Comprehensive metabolic panel   Result Value Ref Range    Sodium 135 133 - 144 mmol/L    Potassium 4.4 3.4 - 5.3 mmol/L    Chloride 105 94 - 109 mmol/L    Carbon Dioxide 21 20 - 32 mmol/L    Anion Gap 9 3 - 14 mmol/L    Glucose 82 70 - 99 mg/dL    Urea Nitrogen 28 7 - 30 mg/dL    Creatinine 2.14 (H) 0.66 - 1.25 mg/dL    GFR Estimate 32 (L) >60 mL/min/[1.73_m2]      Comment:      Non  GFR Calc  Starting 12/18/2018, serum creatinine based estimated GFR (eGFR) will be   calculated using the Chronic Kidney Disease Epidemiology Collaboration   (CKD-EPI) equation.      GFR Estimate If Black 37 (L) >60 mL/min/[1.73_m2]      Comment:       GFR Calc  Starting 12/18/2018, serum creatinine based estimated GFR (eGFR) will be   calculated using the Chronic Kidney Disease Epidemiology Collaboration   (CKD-EPI) equation.      Calcium 9.0 8.5 - 10.1 mg/dL    Bilirubin Total  1.1 0.2 - 1.3 mg/dL    Albumin 3.2 (L) 3.4 - 5.0 g/dL    Protein Total 6.9 6.8 - 8.8 g/dL    Alkaline Phosphatase 280 (H) 40 - 150 U/L    ALT 21 0 - 70 U/L    AST 34 0 - 45 U/L   WBC with Diff   Result Value Ref Range    WBC Canceled, Test credited 4.0 - 11.0 10e9/L      Comment:      Duplicate request  CORRECTED ON 09/30 AT 1531: PREVIOUSLY REPORTED AS 8.0      Diff Method Canceled, Test credited       Comment:      Duplicate request  CORRECTED ON 09/30 AT 1531: PREVIOUSLY REPORTED AS Automated Method      % Neutrophils Canceled, Test credited %      Comment:      Duplicate request  CORRECTED ON 09/30 AT 1531: PREVIOUSLY REPORTED AS 48.4      % Lymphocytes Canceled, Test credited %      Comment:      Duplicate request  CORRECTED ON 09/30 AT 1531: PREVIOUSLY REPORTED AS 23.5      % Monocytes Canceled, Test credited %      Comment:      Duplicate request  CORRECTED ON 09/30 AT 1531: PREVIOUSLY REPORTED AS 22.4      % Eosinophils Canceled, Test credited %      Comment:      Duplicate request  CORRECTED ON 09/30 AT 1531: PREVIOUSLY REPORTED AS 3.9      % Basophils Canceled, Test credited %      Comment:      Duplicate request  CORRECTED ON 09/30 AT 1531: PREVIOUSLY REPORTED AS 0.6      % Immature Granulocytes Canceled, Test credited %      Comment:      Duplicate request  CORRECTED ON 09/30 AT 1531: PREVIOUSLY REPORTED AS 1.2      Nucleated RBCs Canceled, Test credited 0 /100      Comment:      Duplicate request  CORRECTED ON 09/30 AT 1531: PREVIOUSLY REPORTED AS 0      Absolute Neutrophil Canceled, Test credited 1.6 - 8.3 10e9/L      Comment:      Duplicate request  CORRECTED ON 09/30 AT 1531: PREVIOUSLY REPORTED AS 3.9      Absolute Lymphocytes Canceled, Test credited 0.8 - 5.3 10e9/L      Comment:      Duplicate request  CORRECTED ON 09/30 AT 1531: PREVIOUSLY REPORTED AS 1.9      Absolute Monocytes Canceled, Test credited 0.0 - 1.3 10e9/L      Comment:      Duplicate request  CORRECTED ON 09/30 AT 1531: PREVIOUSLY  REPORTED AS 1.8      Absolute Eosinophils Canceled, Test credited 0.0 - 0.7 10e9/L      Comment:      Duplicate request  CORRECTED ON 09/30 AT 1531: PREVIOUSLY REPORTED AS 0.3      Absolute Basophils Canceled, Test credited 0.0 - 0.2 10e9/L      Comment:      Duplicate request  CORRECTED ON 09/30 AT 1531: PREVIOUSLY REPORTED AS 0.1      Abs Immature Granulocytes Canceled, Test credited 0 - 0.4 10e9/L      Comment:      Duplicate request  CORRECTED ON 09/30 AT 1531: PREVIOUSLY REPORTED AS 0.1      Absolute Nucleated RBC Canceled, Test credited       Comment:      Duplicate request  CORRECTED ON 09/30 AT 1531: PREVIOUSLY REPORTED AS 0.0      Anisocytosis Canceled, Test credited       Comment:      Duplicate request  CORRECTED ON 09/30 AT 1531: PREVIOUSLY REPORTED AS Marked      Elliptocytes Canceled, Test credited       Comment:      Duplicate request  CORRECTED ON 09/30 AT 1531: PREVIOUSLY REPORTED AS Slight      Platelet Estimate Canceled, Test credited       Comment:      Duplicate request  CORRECTED ON 09/30 AT 1531: PREVIOUSLY REPORTED AS Automated count confirmed.    Platelet morphology is normal.     Urine Microscopic   Result Value Ref Range    WBC Urine >100 (A) OTO5^0 - 5 /HPF    RBC Urine 10-25 (A) OTO2^O - 2 /HPF   CBC with platelets and differential   Result Value Ref Range    WBC 8.1 4.0 - 11.0 10e9/L    RBC Count 2.95 (L) 4.4 - 5.9 10e12/L    Hemoglobin 7.2 (L) 13.3 - 17.7 g/dL    Hematocrit 24.9 (L) 40.0 - 53.0 %    MCV 84 78 - 100 fl    MCH 24.4 (L) 26.5 - 33.0 pg    MCHC 28.9 (L) 31.5 - 36.5 g/dL    RDW 23.8 (H) 10.0 - 15.0 %    Platelet Count 230 150 - 450 10e9/L    Diff Method Automated Method     % Neutrophils 48.4 %    % Lymphocytes 22.2 %    % Monocytes 23.0 %    % Eosinophils 4.0 %    % Basophils 0.7 %    % Immature Granulocytes 1.7 %    Nucleated RBCs 0 0 /100    Absolute Neutrophil 3.9 1.6 - 8.3 10e9/L    Absolute Lymphocytes 1.8 0.8 - 5.3 10e9/L    Absolute Monocytes 1.9 (H) 0.0 - 1.3 10e9/L     Absolute Eosinophils 0.3 0.0 - 0.7 10e9/L    Absolute Basophils 0.1 0.0 - 0.2 10e9/L    Abs Immature Granulocytes 0.1 0 - 0.4 10e9/L    Absolute Nucleated RBC 0.0     Platelet Estimate       Automated count confirmed.  Platelet morphology is normal.                       If you have any questions or concerns, please call the clinic at the number listed above.       Sincerely,        Chidi Valenzuela MD

## 2019-10-02 ENCOUNTER — PATIENT OUTREACH (OUTPATIENT)
Dept: CARE COORDINATION | Facility: CLINIC | Age: 59
End: 2019-10-02

## 2019-10-02 ASSESSMENT — ACTIVITIES OF DAILY LIVING (ADL): DEPENDENT_IADLS:: TRANSPORTATION

## 2019-10-02 NOTE — LETTER
[facility]  Complex Care Plan  About Me:    Patient Name:  Abhijeet Mariscal    YOB: 1960  Age:         58 year old   Haresh MRN:    [id number] Telephone Information:  Home Phone [phone]   Mobile [phone]       Address:  [address] [place] [address] Email address:  No e-mail address on record      Emergency Contact(s)    Name Relationship Lgl Grd Work Phone Home Phone Mobile Phone   1. RICHARD PEOPLES Sister    [phone]   2. MONIE MARISCAL Other   [phone]    3. HERMELINDA MUNOZ Mother   [phone]            Primary language:  English     needed? No   Other communication barriers: Hearing aides, Physical impairment, Glasses  Preferred Method of Communication:  Phone  Current living arrangement: I live alone in an apartment in his mom's garage.  Mobility Status/ Medical Equipment: Independent w/Device    Health Maintenance:  Health Maintenance Reviewed: Due/Overdue:  Health Maintenance Due   Topic Date Due     PREVENTIVE CARE VISIT  1960     SPIROMETRY  1960     HEPATITIS C SCREENING  1960     ADVANCE CARE PLANNING  1960     COPD ACTION PLAN  1960     HIV SCREENING  12/09/1973     ZOSTER IMMUNIZATION (1 of 2) 12/09/2008     LIPID  09/01/2014     PNEUMOCOCCAL IMMUNIZATION 19-64 HIGHEST RISK (2 of 3 - PPSV23) 01/08/2018     PHQ-9  02/02/2019     ASTHMA ACTION PLAN  03/28/2019     ASTHMA CONTROL TEST  08/07/2019               My Access Plan  Medical Emergency 911   Primary Clinic Line [facility] - [phone]   24 Hour Appointment Line [phone] or  [phone]) (toll-free)   24 Hour Nurse Line [phone] (toll-free)   Preferred Urgent Care [facility] - [place], [phone]   Preferred [facility] [place] [place]  [phone]   Preferred Pharmacy [facility] [place] [address]     Behavioral Health Crisis Line The National Suicide  "Prevention Lifeline at 1-600.504.3425 or 911         My Care Team Members  Patient Care Team       Relationship Specialty Notifications Start End    Chidi Valenzuela MD PCP - General Internal Medicine  12/5/18     Phone: [phone] Fax: [phone] [address] [facility] [address]    Leonor Han LSW Lead Care Coordinator Primary Care - CC Admissions 3/15/19     Phone: [phone] Fax: [phone]        [facility]  Licensed Mental Health  3/18/19     [facility]  for his [employer]    Phone: [phone]                 My Care Plans  Self Management and Treatment Plan, Goals and (Comments)  Goals        General    Psychosocial (pt-stated)     Notes - Note edited  10/2/2019  1:04 PM by Leonor Han LSW    Goal Statement: I will attend my medical appointments as recommended  Measure of Success: Pt will be aware of why he is scheduled & will attend medical appointments  Supportive Steps to Achieve: CC to assist pt with understanding of his medical cares & reasons for appointments  Barriers: Pt stated he has limited transportation & ability to attend appointments  Strengths: Pt wishes to remain in his own home, motivated to remain healthy  Date to Achieve By: December 1, 2019  Patient expressed understanding of goal: Yes    Update 9-4-19:  PCP recommends GI & Hematology appointments, however, pt wishes to \"get over his cold before doing anything else.\"  Update 10-2-19:  SW and pt scheduled above appointments.  Pt will have several follow ups and requested we keep this goal.                 Action Plans on File:   None  Advance Care Plans/Directives Type:   None     My Medical and Care Information  Problem List   Patient Active Problem List   Diagnosis     Essential hypertension     Acute myeloid leukemia in remission (H)     Sensorineural hearing loss, asymmetrical     Alcoholic cirrhosis of liver (H)     Acute alcoholic hepatitis     Coagulation defect (H) "     Osteoarthrosis     Thrombocytopenia (H)     Universal ulcerative (chronic) colitis(556.6) (H)     CKD (chronic kidney disease) stage 3, GFR 30-59 ml/min (H)     Rosacea     Mild intermittent asthma without complication     Peptic ulcer disease     Uncomplicated alcohol dependence (H)     Tobacco dependence syndrome     Tubular adenoma of colon     Normocytic anemia     Leukocytosis with increased monocytes     Generalized weakness     COPD exacerbation (H)     AIDP (acute inflammatory demyelinating polyneuropathy) (H)      Current Medications and Allergies:  See printed Medication Report.    Care Coordination Start Date: 3/15/2019   Frequency of Care Coordination: monthly   Form Last Updated: 10/02/2019

## 2019-10-02 NOTE — PROGRESS NOTES
"Clinic Care Coordination Contact    Follow Up Progress Note      Assessment: SW placed call and spoke with pt about medical appointments.  Pt is agreeable to allowing this writer to assist him.  SW and pt scheduled the following appointments & a letter will be mailed to remind the pt of clinic appointments & locations.    Thursday, October 3rd at 3PM -- [facility] for a CT of abdomen  Tuesday, October 8 at 12:45 PM --[facility] for lab &   Tuesday, October 8 at 1 PM -- Follow up with Dr. Valenzuela  Hemology - Wednesday, October 16 at 2:30 PM -- [facility] with Dr. Rowe.  GI -Wednesday, October 23 at 10:45 AM -- [address] [place] [address], with Dr. Lenz on the 3rd floor.    As we discussed, pt can use [employer] at [phone] to get to/from all medical related appointments.    Goals addressed this encounter:   Goals Addressed                 This Visit's Progress       Patient Stated      Psychosocial (pt-stated)   On track     Goal Statement: I will attend my medical appointments as recommended  Measure of Success: Pt will be aware of why he is scheduled & will attend medical appointments  Supportive Steps to Achieve: CC to assist pt with understanding of his medical cares & reasons for appointments  Barriers: Pt stated he has limited transportation & ability to attend appointments  Strengths: Pt wishes to remain in his own home, motivated to remain healthy  Date to Achieve By: December 1, 2019  Patient expressed understanding of goal: Yes    Update 9-4-19:  PCP recommends GI & Hematology appointments, however, pt wishes to \"get over his cold before doing anything else.\"  Update 10-2-19:  SW and pt scheduled above appointments.  Pt will have several follow ups and requested we keep this goal.                 Intervention/Education provided during outreach: See above     Outreach Frequency: monthly    Plan:   Pt will call [employer] for transportation to appointments " scheduled  Pt will attend clinic appointments as scheduled    Care Coordinator will follow up in 1 month if not sooner.    Leonor Ahmadi [facility]  Primary Care Clinic- Social Work Care Coordinator  [facility]  10/2/2019 1:21 PM  [phone]

## 2019-10-02 NOTE — TELEPHONE ENCOUNTER
ONCOLOGY INTAKE: Records Information      APPT INFORMATION:  Referring provider:  Dr. Chidi Rivero MD  Referring provider s clinic:  [facility]  Reason for visit/diagnosis:  Iron deficiency anemia, unspecified iron deficiency anemia type  Has patient been notified of appointment date and time?: Yes, per pt and [place]     RECORDS INFORMATION:  Were the records received with the referral (via Rightfax)? No, internal referral    Has patient been seen for any external appt for this diagnosis? no    If yes, where? na    Has patient had any imaging or procedures outside of [place] for this condition? no      If Yes, where? na    ADDITIONAL INFORMATION:  na

## 2019-10-02 NOTE — TELEPHONE ENCOUNTER
ailynand  RECORDS RECEIVED FROM: Internal - Alcoholic cirrhosis of liver without ascites per patient, refd by RONIT WHATLEY, med recs epic   DATE RECEIVED: 10.23.2019   NOTES STATUS DETAILS   OFFICE NOTE from referring provider Internal 12.20.2018   OFFICE NOTES from other specialists Internal      Care Everywhere 05.02.2019  02.13.2019  08.02.2018        01.10.2018  06.22.2017  03.16.2017  09.28.2016  07.11.2016  02.17.2016  01.13.2015   DISCHARGE SUMMARY from hospital N/A    MEDICATION LIST Internal    LIVER BIOSPY (IF APPLICABLE)      PATHOLOGY REPORTS  N/A    IMAGING     ENDOSCOPY (IF AVAILABLE) Internal 03.16.2018   COLONOSCOPY (IF AVAILABLE) Internal 03.16.2018   ULTRASOUND LIVER Care Everywhere 1.11.2018 [facility]   CT OF ABDOMEN In process 09.30.2019   MRI OF LIVER N/A    FIBROSCAN, US ELASTOGRAPHY, FIBROSIS SCAN, MR ELASTOGRAPHY N/A    LABS     HEPATIC PANEL (LIVER PANEL) Internal 05.17.2018   BASIC METABOLIC PANEL Internal 07.16.2019   COMPLETE METABOLIC PANEL N/A    COMPLETE BLOOD COUNT (CBC) Internal 09.30.2019   INTERNATIONAL NORMALIZED RATIO (INR) Internal 03.09.2019   HEPATITIS C ANTIBODY Care Everywhere 03.20.2019   HEPATITIS C VIRAL LOAD/PCR N/A    HEPATITIS C GENOTYPE N/A    HEPATITIS B SURFACE ANTIGEN N/A    HEPATITIS B SURFACE ANTIBODY N/A    HEPATITIS B DNA QUANT LEVEL N/A    HEPATITIS B CORE ANTIBODY N/A

## 2019-10-02 NOTE — TELEPHONE ENCOUNTER
RECORDS STATUS - ALL OTHER DIAGNOSIS      RECORDS RECEIVED FROM: Epic/   DATE RECEIVED: 10/16/2019    NOTES STATUS DETAILS   OFFICE NOTE from referring provider Complete  Dr. Chidi Rivero MD   OFFICE NOTE from medical oncologist N/A    DISCHARGE SUMMARY from hospital N/A    DISCHARGE REPORT from the ER N/A    OPERATIVE REPORT N/A    MEDICATION LIST Complete [facility]   CLINICAL TRIAL TREATMENTS TO DATE N/A    LABS     PATHOLOGY REPORTS N/A    ANYTHING RELATED TO DIAGNOSIS Complete EPIC   GENONOMIC TESTING     TYPE:     IMAGING (NEED IMAGES & REPORT)     CT SCANS N/A    MRI     MAMMO     ULTRASOUND     PET

## 2019-10-02 NOTE — LETTER
[facility]  [address] [place] [address]  [phone]    October 2, 2019    Abhijeet Mccauley  [address] [place] [address]      Dear Abhijeet,    I am a clinic care coordinator- that works with Chidi Valenzuela MD at the [facility].    Thank you for speaking with me today & allowing me to assist you with scheduling your upcoming medical appointments:    Thursday, October 3rd at 3PM -- [facility] for a CT of your abdomen  Tuesday, October 8 at 12:45 PM --[facility] for lab &   Tuesday, October 8 at 1 PM -- Follow up with Dr. Valenzuela  Hemology - Wednesday, October 16 at 2:30 PM -- [facility] with Dr. Rowe.  GI -Wednesday, October 23 at 10:45 AM -- [address] [place] [address], with Dr. Lenz on the 3rd floor.    As we discussed, you can use your [employer] Ride at [phone] to get to/from all medical related appointments.    Please feel free to contact me at [phone], with any questions or concerns. We at [facility] are focused on providing you with the highest-quality healthcare experience possible and that all starts with you.       Sincerely,     RUDY Cummings    Enclosed: I have enclosed a copy of the Complex Care Plan. This has helpful information and goals that we have talked about. Please keep this in an easy to access place to use as needed.

## 2019-10-03 ENCOUNTER — TELEPHONE (OUTPATIENT)
Dept: FAMILY MEDICINE | Facility: CLINIC | Age: 59
End: 2019-10-03

## 2019-10-03 ENCOUNTER — MEDICAL CORRESPONDENCE (OUTPATIENT)
Dept: HEALTH INFORMATION MANAGEMENT | Facility: CLINIC | Age: 59
End: 2019-10-03

## 2019-10-03 ENCOUNTER — HOSPITAL ENCOUNTER (OUTPATIENT)
Dept: CT IMAGING | Facility: CLINIC | Age: 59
Discharge: HOME | End: 2019-10-03
Attending: INTERNAL MEDICINE | Admitting: INTERNAL MEDICINE
Payer: COMMERCIAL

## 2019-10-03 DIAGNOSIS — G62.9 PERIPHERAL POLYNEUROPATHY: Primary | ICD-10-CM

## 2019-10-03 DIAGNOSIS — M17.12 PRIMARY OSTEOARTHRITIS OF LEFT KNEE: ICD-10-CM

## 2019-10-03 DIAGNOSIS — R10.9 FLANK PAIN: ICD-10-CM

## 2019-10-03 PROCEDURE — 74176 CT ABD & PELVIS W/O CONTRAST: CPT

## 2019-10-03 NOTE — PROGRESS NOTES
Subjective     Abhijeet Mccauley is a 58 year old male who presents to clinic today for the following health issues:  Chief Complaint   Patient presents with     RECHECK     CT results     Blood Draw       HPI     I saw Abhijeet on 9/30 for left flank pain for 1 month.  Urine did suggest infection so we started Bactrim, but I advised him to get a CT if the pain was not improving.  He did the CT and it showed a staghorn calculus in the left kidney.      His hemoglobin had again drifted down to 7.2 so I wanted him to recheck that this week.  GI and heme appointments are now scheduled.      He is having ongoing problems with hoarse voice.  Feels like allergies are very active right now, lots of eye drainage and itchiness.  He is using a dry eye drop.  Having cough still.  Lots of rhinorrhea.        Patient Active Problem List   Diagnosis     Essential hypertension     Acute myeloid leukemia in remission (H)     Sensorineural hearing loss, asymmetrical     Alcoholic cirrhosis of liver (H)     Acute alcoholic hepatitis     Coagulation defect (H)     Osteoarthrosis     Thrombocytopenia (H)     Universal ulcerative (chronic) colitis(556.6) (H)     CKD (chronic kidney disease) stage 3, GFR 30-59 ml/min (H)     Rosacea     Mild intermittent asthma without complication     Peptic ulcer disease     Uncomplicated alcohol dependence (H)     Tobacco dependence syndrome     Tubular adenoma of colon     Normocytic anemia     Leukocytosis with increased monocytes     Generalized weakness     COPD exacerbation (H)     AIDP (acute inflammatory demyelinating polyneuropathy) (H)     Primary osteoarthritis of left knee     Past Surgical History:   Procedure Laterality Date     AS TOTAL KNEE ARTHROPLASTY Left      BONE MARROW BIOPSY, BONE SPECIMEN, NEEDLE/TROCAR N/A 6/12/2018    Procedure: BIOPSY BONE MARROW;  Bone Marrow Biopsy;  Surgeon: Demar Sapp MD;  Location: [facility]     ESOPHAGOSCOPY, GASTROSCOPY, DUODENOSCOPY (EGD),  COMBINED N/A 2/8/2018    Procedure: COMBINED ESOPHAGOSCOPY, GASTROSCOPY, DUODENOSCOPY (EGD), BIOPSY SINGLE OR MULTIPLE;  gastroscopy with biopsies;  Surgeon: Raz Cobb MD;  Location: [facility]     ESOPHAGOSCOPY, GASTROSCOPY, DUODENOSCOPY (EGD), COMBINED N/A 3/18/2018    Procedure: COMBINED ESOPHAGOSCOPY, GASTROSCOPY, DUODENOSCOPY (EGD);;  Surgeon: Raz Cobb MD;  Location: [facility]     LACERATION REPAIR Right     Right leg     PHACOEMULSIFICATION WITH STANDARD INTRAOCULAR LENS IMPLANT Left 7/1/2019    Procedure: cataract removal with implant.;  Surgeon: Adrian Oliveira MD;  Location: [facility]     PHACOEMULSIFICATION WITH STANDARD INTRAOCULAR LENS IMPLANT Right 7/29/2019    Procedure: Cataract removal with implant.;  Surgeon: Adrian Oliveira MD;  Location: [facility]       Social History     Tobacco Use     Smoking status: Current Every Day Smoker     Packs/day: 0.50     Years: 30.00     Pack years: 15.00     Types: Cigarettes     Smokeless tobacco: Never Used     Tobacco comment: I strongly emphasized the importance of quitting smoking. 4-5 daily   Substance Use Topics     Alcohol use: Yes     Comment: Down to 3 beers per day.  Sometimes a cocktail also.     Family History   Problem Relation Age of Onset     Hypertension Mother      Coronary Artery Disease Father         MI         Current Outpatient Medications   Medication Sig Dispense Refill     montelukast (SINGULAIR) 10 MG tablet Take 1 tablet (10 mg) by mouth At Bedtime 90 tablet 3     albuterol (VENTOLIN HFA) 108 (90 Base) MCG/ACT inhaler Inhale 2 puffs into the lungs every 4 hours as needed for shortness of breath / dyspnea or wheezing 18 g 11     baclofen (LIORESAL) 10 MG tablet TAKE 1/2 TO 1 TABLET BY MOUTH UP TO THREE TIMES A DAY 90 tablet 3     buPROPion (WELLBUTRIN SR) 150 MG 12 hr tablet Take once per day for 3 days and then increase to twice per day 60 tablet 12     cholestyramine (QUESTRAN) 4 g packet Take 1 packet (4 g) by mouth 2 times  daily (with meals) 60 each 11     desonide (DESOWEN) 0.05 % external lotion Apply topically as needed 118 mL 1     furosemide (LASIX) 20 MG tablet Take 1 tablet (20 mg) by mouth every morning 90 tablet 3     gabapentin (NEURONTIN) 300 MG capsule Take 2 capsules (600 mg) by mouth 3 times daily 180 capsule 11     loratadine (CLARITIN) 10 MG tablet Take 1 tablet (10 mg) by mouth daily as needed for allergies 30 tablet 11     mometasone-formoterol (DULERA) 200-5 MCG/ACT oral inhaler Inhale 2 puffs into the lungs 2 times daily        order for DME Equipment being ordered: 4 wheel walker 1 Units 0     order for DME Equipment being ordered: 2 pairs thigh high compression stockings, strength per patient preference (Patient not taking: Reported on 9/10/2019) 2 Units 1     order for DME Equipment being ordered: Compression Stockings TWO (2) Pairs, 15-20 mm Hg. (Patient not taking: Reported on 9/10/2019) 2 Package prn     order for DME Equipment being ordered: Manual wheelchair.  Estimated duration- 3 months (Patient not taking: Reported on 9/10/2019) 1 Units 0     order for DME Equipment being ordered: bed pull up bar 1 Units 0     order for DME Equipment being ordered: Wheelchair (Patient not taking: Reported on 9/10/2019) 1 each 0     order for DME Equipment being ordered: shower chair 1 Device 0     order for DME Equipment being ordered: Toilet seat riser 1 Device 0     pantoprazole (PROTONIX) 40 MG EC tablet Take 1 tablet (40 mg) by mouth daily 90 tablet 3     POTASSIUM CHLORIDE PO        predniSONE (DELTASONE) 20 MG tablet Take 3 tabs by mouth daily x 3 days, then 2 tabs daily x 3 days, then 1 tab daily x 3 days, then 1/2 tab daily x 3 days. 20 tablet 0     propranolol (INDERAL) 20 MG tablet Take 1 tablet (20 mg) by mouth 2 times daily 180 tablet 3     rifaximin (XIFAXAN) 550 MG TABS tablet Take 1 tablet (550 mg) by mouth 2 times daily 180 tablet 3     SODIUM BICARBONATE PO Take 650 mg by mouth daily as needed         "spironolactone (ALDACTONE) 25 MG tablet Take 1 tablet (25 mg) by mouth daily 90 tablet 3     tiotropium (SPIRIVA) 18 MCG capsule Inhale 1 capsule into the lungs daily Using PRN       traZODone (DESYREL) 50 MG tablet Take 1 tablet (50 mg) by mouth At Bedtime 90 tablet 3     vitamin B complex with vitamin C (STRESS TAB) tablet Take 1 tablet by mouth daily (Patient not taking: Reported on 9/30/2019) 90 tablet 3     vitamin B1 (THIAMINE) 100 MG tablet Take 1 tablet (100 mg) by mouth daily (Patient not taking: Reported on 9/30/2019) 90 tablet 3     Allergies   Allergen Reactions     Blood Transfusion Related (Informational Only)      Patient has a history of a clinically significant antibody against RBC antigens.  A delay in compatible RBCs may occur.     Famotidine Unknown and Other (See Comments)     Severe abdominal cramps     Cyclobenzaprine Hives     Methocarbamol Other (See Comments)     Made pt's \"face break out\"     Pepcid Cramps     Severe abdominal cramps     Vancomycin Other (See Comments)     hallucinations     Vfend Other (See Comments)     IV - cold sweats, Iron tablet makes him nauseated and stomach ached     Hydrocodone-Acetaminophen Rash       Reviewed and updated as needed this visit by Provider         Review of Systems   ROS COMP: Constitutional,  systems are negative, except as otherwise noted.      Objective    /64 (BP Location: Left arm, Patient Position: Sitting, Cuff Size: Adult Regular)   Pulse 79   Temp 97.6  F (36.4  C) (Tympanic)   Wt 84.8 kg (187 lb)   SpO2 96%   BMI 30.18 kg/m    Body mass index is 30.18 kg/m .  Physical Exam     GENERAL: healthy, alert and no distress  RESP: lungs clear to auscultation - no rales, rhonchi or wheezes  CV: regular rate and rhythm, normal S1 S2, no S3 or S4, no murmur, click or rub  ABDOMEN: soft, no tenderness    Diagnostic Test Results:  Labs reviewed in Epic  Results for orders placed or performed in visit on 10/08/19 (from the past 24 " hour(s))   Hemoglobin   Result Value Ref Range    Hemoglobin 7.5 (LL) 13.3 - 17.7 g/dL           Assessment & Plan     1. Normocytic anemia    Hemoglobin has improved from 7.2 last week to 7.5 today.  No need for transfusion.  He is going be seeing hematology next week for further work-up and also has follow-up scheduled with GI.    - Hemoglobin    2. Staghorn calculus    His abdominal pain symptoms have improved with the antibiotics for the urinary tract infection.  Staghorn calculus in the left kidney likely predisposing to infection.  Advised he see urology about having this removed.    - UROLOGY ADULT REFERRAL    3. Seasonal allergic rhinitis due to pollen and others    He is having eye, throat, nasal, and cough symptoms which all seem likely related to allergies.  He is currently taking Claritin once per day.  Suggested adding Singulair to this.  If symptoms are not improving after a month, specifically the hoarseness of voice, recommended follow-up with ENT as already referred.    - montelukast (SINGULAIR) 10 MG tablet; Take 1 tablet (10 mg) by mouth At Bedtime  Dispense: 90 tablet; Refill: 3       Return in about 8 days (around 10/16/2019) for hematology.    Chidi Valenzuela MD  [facility]    Chart documentation was done using Dragon dictation software. Although reviewed after completion, some errors may remain.

## 2019-10-04 LAB
BACTERIA SPEC CULT: ABNORMAL
BACTERIA SPEC CULT: ABNORMAL
Lab: ABNORMAL
SPECIMEN SOURCE: ABNORMAL

## 2019-10-04 NOTE — TELEPHONE ENCOUNTER
[facility] DME supply request form for 4 wheeled walker received, completed, signed and faxed to [facility] at [phone]

## 2019-10-07 ENCOUNTER — TELEPHONE (OUTPATIENT)
Dept: FAMILY MEDICINE | Facility: CLINIC | Age: 59
End: 2019-10-07

## 2019-10-07 NOTE — TELEPHONE ENCOUNTER
Do,    The CPT numbers for patient's order for walker are     Peripheral polyneuropathy [G62.9]  - Primary       Primary osteoarthritis of left knee [M17.12]         Was there an issue with these codes?    Packet placed in your basket    Routing to HAYLEE ESTRADA Rn

## 2019-10-08 ENCOUNTER — OFFICE VISIT (OUTPATIENT)
Dept: FAMILY MEDICINE | Facility: CLINIC | Age: 59
End: 2019-10-08
Payer: COMMERCIAL

## 2019-10-08 VITALS
WEIGHT: 187 LBS | SYSTOLIC BLOOD PRESSURE: 128 MMHG | DIASTOLIC BLOOD PRESSURE: 64 MMHG | HEART RATE: 79 BPM | OXYGEN SATURATION: 96 % | BODY MASS INDEX: 30.18 KG/M2 | TEMPERATURE: 97.6 F

## 2019-10-08 DIAGNOSIS — D64.9 NORMOCYTIC ANEMIA: Primary | ICD-10-CM

## 2019-10-08 DIAGNOSIS — J30.1 SEASONAL ALLERGIC RHINITIS DUE TO POLLEN: ICD-10-CM

## 2019-10-08 DIAGNOSIS — N20.0 STAGHORN CALCULUS: ICD-10-CM

## 2019-10-08 LAB — HGB BLD-MCNC: 7.5 G/DL (ref 13.3–17.7)

## 2019-10-08 PROCEDURE — 36415 COLL VENOUS BLD VENIPUNCTURE: CPT | Performed by: INTERNAL MEDICINE

## 2019-10-08 PROCEDURE — 99214 OFFICE O/P EST MOD 30 MIN: CPT | Performed by: INTERNAL MEDICINE

## 2019-10-08 PROCEDURE — 85018 HEMOGLOBIN: CPT | Performed by: INTERNAL MEDICINE

## 2019-10-08 RX ORDER — MONTELUKAST SODIUM 10 MG/1
10 TABLET ORAL AT BEDTIME
Qty: 90 TABLET | Refills: 3 | Status: SHIPPED | OUTPATIENT
Start: 2019-10-08 | End: 2021-01-04

## 2019-10-08 NOTE — Clinical Note
Akshat WAITE placed a referral to urology for Abhijeet to follow-up on his kidney stone.  He states he would appreciate your assistance with scheduling this appointment.Thanks,Elodia

## 2019-10-08 NOTE — PATIENT INSTRUCTIONS
Start the Singulair in addition to the Claritin to help out with allergy symptoms.  This should hopefully help the eyes, nose, throat, and cough.  If the hoarse voice is not improving, I'd recommend seeing the ENT specialist.

## 2019-10-09 ENCOUNTER — PATIENT OUTREACH (OUTPATIENT)
Dept: CARE COORDINATION | Facility: CLINIC | Age: 59
End: 2019-10-09

## 2019-10-09 ASSESSMENT — ACTIVITIES OF DAILY LIVING (ADL): DEPENDENT_IADLS:: TRANSPORTATION

## 2019-10-09 NOTE — PROGRESS NOTES
"Clinic Care Coordination Contact    Follow Up Progress Note      Assessment: SW was asked by PCP to assist the pt with scheduling with Urology & ENT.    SW and pt called [facility] sheduling via conference call and made the following appointments:  1.  Urology 11-18-19 at 245 PM with Dr. Patton  2.  ENT on 10-16-19 at 145 with Dr. Barron    Pt is aware that he needs to call to arrange transportation to get to these appointments.    Goals addressed this encounter:   Goals Addressed                 This Visit's Progress       Patient Stated      Psychosocial (pt-stated)   On track     Goal Statement: I will attend my medical appointments as recommended  Measure of Success: Pt will be aware of why he is scheduled & will attend medical appointments  Supportive Steps to Achieve: SWCC to assist pt with understanding of his medical cares & reasons for appointments  Barriers: Pt stated he has limited transportation & ability to attend appointments  Strengths: Pt wishes to remain in his own home, motivated to remain healthy  Date to Achieve By: December 1, 2019  Patient expressed understanding of goal: Yes    Update 9-4-19:  PCP recommends GI & Hematology appointments, however, pt wishes to \"get over his cold before doing anything else.\"  Update 10-2-19:  SW and pt scheduled above appointments.  Pt will have several follow ups and requested we keep this goal.                Intervention/Education provided during outreach: See above     Outreach Frequency: monthly    Plan:   Pt to attend numerous medical appointments in the next month.  Care Coordinator will follow up in 1 month.    Leonor Ahmadi [facility]  Primary Care Clinic- Social Work Care Coordinator  [facility]  10/9/2019 10:58 AM  [phone]      "

## 2019-10-15 NOTE — PROGRESS NOTES
Chief Complaint   Patient presents with     Hoarse     going on for months - sometimes it gets worse where patient can hardly talk     History of Present Illness  Abhijeet Mccauley is a 58 year old male who presents today for evaluation.  I am seeing this patient in consultation for voice hoarseness at the request of the provider Dr. Valenzuela.  Patient reports a several year history of intermittent dysphonia.  He feels like his symptoms started after he was treated for leukemia 8 or 9 years ago.  He does intermittently have some trouble swallowing but denies any significant dysphagia, odynophagia, throat pain, otalgia, hemoptysis, neck lumps, bumps, swelling.  No unintentional weight loss.  He does have some neck soreness and soreness of the anterior neck muscles occasionally.  He is a smoker with a significant smoking history.  He continues to smoke 5 to 10 cigarettes/day. The patient does report a history of gastroesophageal reflux.  He does take a proton pump inhibitor daily.  On the proton pump inhibitor, the patient reports reflux symptoms 2 or 3 times a week. The patient does have a history of COPD and does use inhalers.      Past Medical History  Patient Active Problem List   Diagnosis     Essential hypertension     Acute myeloid leukemia in remission (H)     Sensorineural hearing loss, asymmetrical     Alcoholic cirrhosis of liver (H)     Acute alcoholic hepatitis     Coagulation defect (H)     Osteoarthrosis     Thrombocytopenia (H)     Universal ulcerative (chronic) colitis(556.6) (H)     CKD (chronic kidney disease) stage 3, GFR 30-59 ml/min (H)     Rosacea     Mild intermittent asthma without complication     Peptic ulcer disease     Uncomplicated alcohol dependence (H)     Tobacco dependence syndrome     Tubular adenoma of colon     Normocytic anemia     Leukocytosis with increased monocytes     Generalized weakness     COPD exacerbation (H)     AIDP (acute inflammatory demyelinating polyneuropathy) (H)      Primary osteoarthritis of left knee     Current Medications     Current Outpatient Medications:      albuterol (VENTOLIN HFA) 108 (90 Base) MCG/ACT inhaler, Inhale 2 puffs into the lungs every 4 hours as needed for shortness of breath / dyspnea or wheezing, Disp: 18 g, Rfl: 11     baclofen (LIORESAL) 10 MG tablet, TAKE 1/2 TO 1 TABLET BY MOUTH UP TO THREE TIMES A DAY, Disp: 90 tablet, Rfl: 3     buPROPion (WELLBUTRIN SR) 150 MG 12 hr tablet, Take once per day for 3 days and then increase to twice per day, Disp: 60 tablet, Rfl: 12     cholestyramine (QUESTRAN) 4 g packet, Take 1 packet (4 g) by mouth 2 times daily (with meals), Disp: 60 each, Rfl: 11     desonide (DESOWEN) 0.05 % external lotion, Apply topically as needed, Disp: 118 mL, Rfl: 1     furosemide (LASIX) 20 MG tablet, Take 1 tablet (20 mg) by mouth every morning, Disp: 90 tablet, Rfl: 3     gabapentin (NEURONTIN) 300 MG capsule, Take 2 capsules (600 mg) by mouth 3 times daily, Disp: 180 capsule, Rfl: 11     mometasone-formoterol (DULERA) 200-5 MCG/ACT oral inhaler, Inhale 2 puffs into the lungs 2 times daily , Disp: , Rfl:      montelukast (SINGULAIR) 10 MG tablet, Take 1 tablet (10 mg) by mouth At Bedtime, Disp: 90 tablet, Rfl: 3     nicotine (NICOTROL) 10 MG inhaler, Inhale 6-16 Cartridges into the lungs daily as needed for smoking cessation, Disp: 168 Cartridge, Rfl: 1     order for DME, Equipment being ordered: 4 wheel walker, Disp: 1 Units, Rfl: 0     order for DME, Equipment being ordered: bed pull up bar, Disp: 1 Units, Rfl: 0     order for DME, Equipment being ordered: shower chair, Disp: 1 Device, Rfl: 0     order for DME, Equipment being ordered: Toilet seat riser, Disp: 1 Device, Rfl: 0     pantoprazole (PROTONIX) 40 MG EC tablet, Take 1 tablet (40 mg) by mouth daily, Disp: 90 tablet, Rfl: 3     POTASSIUM CHLORIDE PO, , Disp: , Rfl:      predniSONE (DELTASONE) 20 MG tablet, Take 3 tabs by mouth daily x 3 days, then 2 tabs daily x 3 days, then 1  tab daily x 3 days, then 1/2 tab daily x 3 days., Disp: 20 tablet, Rfl: 0     propranolol (INDERAL) 20 MG tablet, Take 1 tablet (20 mg) by mouth 2 times daily, Disp: 180 tablet, Rfl: 3     rifaximin (XIFAXAN) 550 MG TABS tablet, Take 1 tablet (550 mg) by mouth 2 times daily, Disp: 180 tablet, Rfl: 3     SODIUM BICARBONATE PO, Take 650 mg by mouth daily as needed , Disp: , Rfl:      spironolactone (ALDACTONE) 25 MG tablet, Take 1 tablet (25 mg) by mouth daily, Disp: 90 tablet, Rfl: 3     tiotropium (SPIRIVA) 18 MCG capsule, Inhale 1 capsule into the lungs daily Using PRN, Disp: , Rfl:      traZODone (DESYREL) 50 MG tablet, Take 1 tablet (50 mg) by mouth At Bedtime, Disp: 90 tablet, Rfl: 3     loratadine (CLARITIN) 10 MG tablet, Take 1 tablet (10 mg) by mouth daily as needed for allergies (Patient not taking: Reported on 10/16/2019), Disp: 30 tablet, Rfl: 11     order for DME, Equipment being ordered: 2 pairs thigh high compression stockings, strength per patient preference (Patient not taking: Reported on 9/10/2019), Disp: 2 Units, Rfl: 1     order for DME, Equipment being ordered: Compression Stockings TWO (2) Pairs, 15-20 mm Hg. (Patient not taking: Reported on 9/10/2019), Disp: 2 Package, Rfl: prn     order for DME, Equipment being ordered: Manual wheelchair.  Estimated duration- 3 months (Patient not taking: Reported on 9/10/2019), Disp: 1 Units, Rfl: 0     order for DME, Equipment being ordered: Wheelchair (Patient not taking: Reported on 9/10/2019), Disp: 1 each, Rfl: 0     vitamin B complex with vitamin C (STRESS TAB) tablet, Take 1 tablet by mouth daily (Patient not taking: Reported on 9/30/2019), Disp: 90 tablet, Rfl: 3     vitamin B1 (THIAMINE) 100 MG tablet, Take 1 tablet (100 mg) by mouth daily (Patient not taking: Reported on 9/30/2019), Disp: 90 tablet, Rfl: 3    Allergies  Allergies   Allergen Reactions     Blood Transfusion Related (Informational Only)      Patient has a history of a clinically  "significant antibody against RBC antigens.  A delay in compatible RBCs may occur.     Famotidine Unknown and Other (See Comments)     Severe abdominal cramps     Cyclobenzaprine Hives     Methocarbamol Other (See Comments)     Made pt's \"face break out\"     Pepcid Cramps     Severe abdominal cramps     Vancomycin Other (See Comments)     hallucinations     Vfend Other (See Comments)     IV - cold sweats, Iron tablet makes him nauseated and stomach ached     Hydrocodone-Acetaminophen Rash       Social History   Social History     Socioeconomic History     Marital status: Single     Spouse name: Not on file     Number of children: Not on file     Years of education: Not on file     Highest education level: Not on file   Occupational History     Not on file   Social Needs     Financial resource strain: Not on file     Food insecurity:     Worry: Not on file     Inability: Not on file     Transportation needs:     Medical: Not on file     Non-medical: Not on file   Tobacco Use     Smoking status: Current Every Day Smoker     Packs/day: 0.50     Years: 30.00     Pack years: 15.00     Types: Cigarettes     Smokeless tobacco: Never Used     Tobacco comment: I strongly emphasized the importance of quitting smoking. 4-5 daily   Substance and Sexual Activity     Alcohol use: Yes     Comment: Down to 3 beers per day.  Sometimes a cocktail also.     Drug use: Yes     Types: Marijuana     Sexual activity: Not on file   Lifestyle     Physical activity:     Days per week: Not on file     Minutes per session: Not on file     Stress: Not on file   Relationships     Social connections:     Talks on phone: Not on file     Gets together: Not on file     Attends Restorationist service: Not on file     Active member of club or organization: Not on file     Attends meetings of clubs or organizations: Not on file     Relationship status: Not on file     Intimate partner violence:     Fear of current or ex partner: Not on file     Emotionally " abused: Not on file     Physically abused: Not on file     Forced sexual activity: Not on file   Other Topics Concern     Parent/sibling w/ CABG, MI or angioplasty before 65F 55M? Not Asked   Social History Narrative     Not on file       Family History  Family History   Problem Relation Age of Onset     Hypertension Mother      Coronary Artery Disease Father         MI       Review of Systems  As per HPI and PMHx, otherwise 10+ comprehensive system review is negative.    Physical Exam  /64 (BP Location: Left arm, Patient Position: Chair, Cuff Size: Adult Regular)   Pulse 73   Temp 98.6  F (37  C) (Oral)   Resp 16   Wt 84.8 kg (187 lb)   BMI 30.18 kg/m    GENERAL: Patient is a pleasant, cooperative 58 year old male in no acute distress.  HEAD: Normocephalic, atraumatic.  Hair and scalp are normal.  EYES: Pupils are equal, round, reactive to light and accommodation.  Extraocular movements are intact.  The sclera nonicteric without injection.  The extraocular structures are normal.  EARS: Normal shape and symmetry.  No tenderness when palpating the mastoid or tragal areas bilaterally.  Otoscopic exam reveals a minimal amount of cerumen bilaterally.  The bilateral tympanic membranes are round, intact without evidence of effusion, good landmarks.  No retraction, granulation, or drainage.  NOSE: Nares are patent.  Nasal mucosa is pink and moist.  Anterior rhinoscopy reveals a leftward nasal septal deviation.  No nasal cavity mass, polyps, or mucopurulence on anterior rhinoscopy.  ORAL CAVITY: Dentition is in reasonably good repair.  Mucous membranes are dry.  Tongue is mobile, protrudes to the midline.  Palate elevates symmetrically. No erythema or exudate.  No oral cavity or oropharyngeal masses, lesions, ulcerations, leukoplakia.  NECK: Supple, trachea is midline.  There no palpable cervical lymphadenopathy or masses bilaterally.  Palpation of the bilateral parotid and submandibular areas reveal no masses.   No thyromegaly.  There is some tenderness of the anterior neck muscles.  NEUROLOGIC: Cranial nerves II through XII are grossly intact.  The patients voice is hoarse with intermittent vocal breaks.  Patient is House-Brackman I/VI bilaterally.  CARDIOVASCULAR: Extremities are warm and well-perfused.  No significant peripheral edema.  RESPIRATORY: Patient has nonlabored breathing without cough, wheeze, stridor.  PSYCHIATRIC: Patient is alert and oriented.  Mood and affect appear normal.  SKIN: Warm and dry.  No scalp, face, or neck lesions noted.    Procedure: Flexible Laryngoscopy  Indication: Dysphonia    To best visualize the upper airway anatomy and due to the chief complaint and HPI, I proceeded with flexible fiberoptic laryngoscopy examination.  The bilateral nasal cavities were anesthetized and decongested with a mixture of lidocaine and neosynephrine.  The bilateral nasal cavities were examined using a flexible fiberoptic laryngoscope.  There were no nasal cavity masses, polyps, or mucopurulence bilaterally. The nasopharynx had a normal appearance with normal Eustachian tube openings and fossa of Rosenmuller bilaterally.  Minimal adenoid tissue.  There is some mild posterior oropharyngeal cobblestoning that extends to the piriforms.  The base of tongue, vallecula, epiglottis, aryepiglottic folds, arytenoids, and piriform sinuses were without mass or lesion.  The bilateral true vocal folds were symmetrically mobile without nodules or masses.  The visualized portions of the infraglottic and subglottic airway are unremarkable.  The scope was removed.  The patient tolerated the procedure well.    Assessment and Plan    ICD-10-CM    1. Dysphonia R49.0 LARYNGOSCOPY FLEX FIBEROPTIC, DIAGNOSTIC     SPEECH THERAPY REFERRAL     nicotine (NICOTROL) 10 MG inhaler   2. History of gastroesophageal reflux (GERD) Z87.19 LARYNGOSCOPY FLEX FIBEROPTIC, DIAGNOSTIC     SPEECH THERAPY REFERRAL     nicotine (NICOTROL) 10 MG inhaler    3. Tobacco dependence syndrome F17.200 LARYNGOSCOPY FLEX FIBEROPTIC, DIAGNOSTIC     SPEECH THERAPY REFERRAL     nicotine (NICOTROL) 10 MG inhaler   4. Encounter for tobacco use cessation counseling Z71.6 LARYNGOSCOPY FLEX FIBEROPTIC, DIAGNOSTIC     SPEECH THERAPY REFERRAL     nicotine (NICOTROL) 10 MG inhaler      It was my pleasure seeing Abhijeet Mccauley today in clinic.  The patient presented today with hoarseness but no evidence of infection, inflammation, or neoplasm on exam to explain the symptoms.  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  We discussed the importance of tobacco cessation.  I do think he benefit from a trial of speech therapy.  I provided him with a prescription for the Nicotrol inhaler.  The patient will make an appointment to see speech therapy for good voice use and vocal hygiene techniques.  The patient will return as needed.     Kenton Barron MD  Department of Otolarygology-Head and Neck Surgery  [facility]

## 2019-10-16 ENCOUNTER — PRE VISIT (OUTPATIENT)
Dept: ONCOLOGY | Facility: CLINIC | Age: 59
End: 2019-10-16

## 2019-10-16 ENCOUNTER — OFFICE VISIT (OUTPATIENT)
Dept: OTOLARYNGOLOGY | Facility: CLINIC | Age: 59
End: 2019-10-16
Attending: INTERNAL MEDICINE
Payer: COMMERCIAL

## 2019-10-16 VITALS
SYSTOLIC BLOOD PRESSURE: 113 MMHG | WEIGHT: 187 LBS | BODY MASS INDEX: 30.18 KG/M2 | TEMPERATURE: 98.6 F | HEART RATE: 73 BPM | RESPIRATION RATE: 16 BRPM | DIASTOLIC BLOOD PRESSURE: 64 MMHG

## 2019-10-16 DIAGNOSIS — Z71.6 ENCOUNTER FOR TOBACCO USE CESSATION COUNSELING: ICD-10-CM

## 2019-10-16 DIAGNOSIS — F17.200 TOBACCO DEPENDENCE SYNDROME: ICD-10-CM

## 2019-10-16 DIAGNOSIS — Z87.19 HISTORY OF GASTROESOPHAGEAL REFLUX (GERD): ICD-10-CM

## 2019-10-16 DIAGNOSIS — R49.0 DYSPHONIA: Primary | ICD-10-CM

## 2019-10-16 PROCEDURE — 31575 DIAGNOSTIC LARYNGOSCOPY: CPT | Performed by: OTOLARYNGOLOGY

## 2019-10-16 PROCEDURE — 99244 OFF/OP CNSLTJ NEW/EST MOD 40: CPT | Mod: 25 | Performed by: OTOLARYNGOLOGY

## 2019-10-16 NOTE — PROGRESS NOTES
This laryngoscopy scope was  used on this patient.    Flexible    Scope Number: Javi Storz Flexible #[id number] [email or web address] Delgado CMA

## 2019-10-16 NOTE — LETTER
10/16/2019         RE: Abhijeet Mccauley  [address] [place] [id number]        Dear Colleague,    Thank you for referring your patient, Abhijeet Mccauley, to the [facility]. Please see a copy of my visit note below.    Chief Complaint   Patient presents with     Hoarse     going on for months - sometimes it gets worse where patient can hardly talk     History of Present Illness  Abhijeet Mccauley is a 58 year old male who presents today for evaluation.  I am seeing this patient in consultation for voice hoarseness at the request of the provider Dr. Valenzuela.  Patient reports a several year history of intermittent dysphonia.  He feels like his symptoms started after he was treated for leukemia 8 or 9 years ago.  He does intermittently have some trouble swallowing but denies any significant dysphagia, odynophagia, throat pain, otalgia, hemoptysis, neck lumps, bumps, swelling.  No unintentional weight loss.  He does have some neck soreness and soreness of the anterior neck muscles occasionally.  He is a smoker with a significant smoking history.  He continues to smoke 5 to 10 cigarettes/day. The patient does report a history of gastroesophageal reflux.  He does take a proton pump inhibitor daily.  On the proton pump inhibitor, the patient reports reflux symptoms 2 or 3 times a week. The patient does have a history of COPD and does use inhalers.      Past Medical History  Patient Active Problem List   Diagnosis     Essential hypertension     Acute myeloid leukemia in remission (H)     Sensorineural hearing loss, asymmetrical     Alcoholic cirrhosis of liver (H)     Acute alcoholic hepatitis     Coagulation defect (H)     Osteoarthrosis     Thrombocytopenia (H)     Universal ulcerative (chronic) colitis(556.6) (H)     CKD (chronic kidney disease) stage 3, GFR 30-59 ml/min (H)     Rosacea     Mild intermittent asthma without complication     Peptic ulcer disease     Uncomplicated alcohol dependence (H)      Tobacco dependence syndrome     Tubular adenoma of colon     Normocytic anemia     Leukocytosis with increased monocytes     Generalized weakness     COPD exacerbation (H)     AIDP (acute inflammatory demyelinating polyneuropathy) (H)     Primary osteoarthritis of left knee     Current Medications     Current Outpatient Medications:      albuterol (VENTOLIN HFA) 108 (90 Base) MCG/ACT inhaler, Inhale 2 puffs into the lungs every 4 hours as needed for shortness of breath / dyspnea or wheezing, Disp: 18 g, Rfl: 11     baclofen (LIORESAL) 10 MG tablet, TAKE 1/2 TO 1 TABLET BY MOUTH UP TO THREE TIMES A DAY, Disp: 90 tablet, Rfl: 3     buPROPion (WELLBUTRIN SR) 150 MG 12 hr tablet, Take once per day for 3 days and then increase to twice per day, Disp: 60 tablet, Rfl: 12     cholestyramine (QUESTRAN) 4 g packet, Take 1 packet (4 g) by mouth 2 times daily (with meals), Disp: 60 each, Rfl: 11     desonide (DESOWEN) 0.05 % external lotion, Apply topically as needed, Disp: 118 mL, Rfl: 1     furosemide (LASIX) 20 MG tablet, Take 1 tablet (20 mg) by mouth every morning, Disp: 90 tablet, Rfl: 3     gabapentin (NEURONTIN) 300 MG capsule, Take 2 capsules (600 mg) by mouth 3 times daily, Disp: 180 capsule, Rfl: 11     mometasone-formoterol (DULERA) 200-5 MCG/ACT oral inhaler, Inhale 2 puffs into the lungs 2 times daily , Disp: , Rfl:      montelukast (SINGULAIR) 10 MG tablet, Take 1 tablet (10 mg) by mouth At Bedtime, Disp: 90 tablet, Rfl: 3     nicotine (NICOTROL) 10 MG inhaler, Inhale 6-16 Cartridges into the lungs daily as needed for smoking cessation, Disp: 168 Cartridge, Rfl: 1     order for DME, Equipment being ordered: 4 wheel walker, Disp: 1 Units, Rfl: 0     order for DME, Equipment being ordered: bed pull up bar, Disp: 1 Units, Rfl: 0     order for DME, Equipment being ordered: shower chair, Disp: 1 Device, Rfl: 0     order for DME, Equipment being ordered: Toilet seat riser, Disp: 1 Device, Rfl: 0     pantoprazole  (PROTONIX) 40 MG EC tablet, Take 1 tablet (40 mg) by mouth daily, Disp: 90 tablet, Rfl: 3     POTASSIUM CHLORIDE PO, , Disp: , Rfl:      predniSONE (DELTASONE) 20 MG tablet, Take 3 tabs by mouth daily x 3 days, then 2 tabs daily x 3 days, then 1 tab daily x 3 days, then 1/2 tab daily x 3 days., Disp: 20 tablet, Rfl: 0     propranolol (INDERAL) 20 MG tablet, Take 1 tablet (20 mg) by mouth 2 times daily, Disp: 180 tablet, Rfl: 3     rifaximin (XIFAXAN) 550 MG TABS tablet, Take 1 tablet (550 mg) by mouth 2 times daily, Disp: 180 tablet, Rfl: 3     SODIUM BICARBONATE PO, Take 650 mg by mouth daily as needed , Disp: , Rfl:      spironolactone (ALDACTONE) 25 MG tablet, Take 1 tablet (25 mg) by mouth daily, Disp: 90 tablet, Rfl: 3     tiotropium (SPIRIVA) 18 MCG capsule, Inhale 1 capsule into the lungs daily Using PRN, Disp: , Rfl:      traZODone (DESYREL) 50 MG tablet, Take 1 tablet (50 mg) by mouth At Bedtime, Disp: 90 tablet, Rfl: 3     loratadine (CLARITIN) 10 MG tablet, Take 1 tablet (10 mg) by mouth daily as needed for allergies (Patient not taking: Reported on 10/16/2019), Disp: 30 tablet, Rfl: 11     order for DME, Equipment being ordered: 2 pairs thigh high compression stockings, strength per patient preference (Patient not taking: Reported on 9/10/2019), Disp: 2 Units, Rfl: 1     order for DME, Equipment being ordered: Compression Stockings TWO (2) Pairs, 15-20 mm Hg. (Patient not taking: Reported on 9/10/2019), Disp: 2 Package, Rfl: prn     order for DME, Equipment being ordered: Manual wheelchair.  Estimated duration- 3 months (Patient not taking: Reported on 9/10/2019), Disp: 1 Units, Rfl: 0     order for DME, Equipment being ordered: Wheelchair (Patient not taking: Reported on 9/10/2019), Disp: 1 each, Rfl: 0     vitamin B complex with vitamin C (STRESS TAB) tablet, Take 1 tablet by mouth daily (Patient not taking: Reported on 9/30/2019), Disp: 90 tablet, Rfl: 3     vitamin B1 (THIAMINE) 100 MG tablet, Take 1  "tablet (100 mg) by mouth daily (Patient not taking: Reported on 9/30/2019), Disp: 90 tablet, Rfl: 3    Allergies  Allergies   Allergen Reactions     Blood Transfusion Related (Informational Only)      Patient has a history of a clinically significant antibody against RBC antigens.  A delay in compatible RBCs may occur.     Famotidine Unknown and Other (See Comments)     Severe abdominal cramps     Cyclobenzaprine Hives     Methocarbamol Other (See Comments)     Made pt's \"face break out\"     Pepcid Cramps     Severe abdominal cramps     Vancomycin Other (See Comments)     hallucinations     Vfend Other (See Comments)     IV - cold sweats, Iron tablet makes him nauseated and stomach ached     Hydrocodone-Acetaminophen Rash       Social History   Social History     Socioeconomic History     Marital status: Single     Spouse name: Not on file     Number of children: Not on file     Years of education: Not on file     Highest education level: Not on file   Occupational History     Not on file   Social Needs     Financial resource strain: Not on file     Food insecurity:     Worry: Not on file     Inability: Not on file     Transportation needs:     Medical: Not on file     Non-medical: Not on file   Tobacco Use     Smoking status: Current Every Day Smoker     Packs/day: 0.50     Years: 30.00     Pack years: 15.00     Types: Cigarettes     Smokeless tobacco: Never Used     Tobacco comment: I strongly emphasized the importance of quitting smoking. 4-5 daily   Substance and Sexual Activity     Alcohol use: Yes     Comment: Down to 3 beers per day.  Sometimes a cocktail also.     Drug use: Yes     Types: Marijuana     Sexual activity: Not on file   Lifestyle     Physical activity:     Days per week: Not on file     Minutes per session: Not on file     Stress: Not on file   Relationships     Social connections:     Talks on phone: Not on file     Gets together: Not on file     Attends Rastafarian service: Not on file     " Active member of club or organization: Not on file     Attends meetings of clubs or organizations: Not on file     Relationship status: Not on file     Intimate partner violence:     Fear of current or ex partner: Not on file     Emotionally abused: Not on file     Physically abused: Not on file     Forced sexual activity: Not on file   Other Topics Concern     Parent/sibling w/ CABG, MI or angioplasty before 65F 55M? Not Asked   Social History Narrative     Not on file       Family History  Family History   Problem Relation Age of Onset     Hypertension Mother      Coronary Artery Disease Father         MI       Review of Systems  As per HPI and PMHx, otherwise 10+ comprehensive system review is negative.    Physical Exam  /64 (BP Location: Left arm, Patient Position: Chair, Cuff Size: Adult Regular)   Pulse 73   Temp 98.6  F (37  C) (Oral)   Resp 16   Wt 84.8 kg (187 lb)   BMI 30.18 kg/m     GENERAL: Patient is a pleasant, cooperative 58 year old male in no acute distress.  HEAD: Normocephalic, atraumatic.  Hair and scalp are normal.  EYES: Pupils are equal, round, reactive to light and accommodation.  Extraocular movements are intact.  The sclera nonicteric without injection.  The extraocular structures are normal.  EARS: Normal shape and symmetry.  No tenderness when palpating the mastoid or tragal areas bilaterally.  Otoscopic exam reveals a minimal amount of cerumen bilaterally.  The bilateral tympanic membranes are round, intact without evidence of effusion, good landmarks.  No retraction, granulation, or drainage.  NOSE: Nares are patent.  Nasal mucosa is pink and moist.  Anterior rhinoscopy reveals a leftward nasal septal deviation.  No nasal cavity mass, polyps, or mucopurulence on anterior rhinoscopy.  ORAL CAVITY: Dentition is in reasonably good repair.  Mucous membranes are dry.  Tongue is mobile, protrudes to the midline.  Palate elevates symmetrically. No erythema or exudate.  No oral  cavity or oropharyngeal masses, lesions, ulcerations, leukoplakia.  NECK: Supple, trachea is midline.  There no palpable cervical lymphadenopathy or masses bilaterally.  Palpation of the bilateral parotid and submandibular areas reveal no masses.  No thyromegaly.  There is some tenderness of the anterior neck muscles.  NEUROLOGIC: Cranial nerves II through XII are grossly intact.  The patients voice is hoarse with intermittent vocal breaks.  Patient is House-Brackman I/VI bilaterally.  CARDIOVASCULAR: Extremities are warm and well-perfused.  No significant peripheral edema.  RESPIRATORY: Patient has nonlabored breathing without cough, wheeze, stridor.  PSYCHIATRIC: Patient is alert and oriented.  Mood and affect appear normal.  SKIN: Warm and dry.  No scalp, face, or neck lesions noted.    Procedure: Flexible Laryngoscopy  Indication: Dysphonia    To best visualize the upper airway anatomy and due to the chief complaint and HPI, I proceeded with flexible fiberoptic laryngoscopy examination.  The bilateral nasal cavities were anesthetized and decongested with a mixture of lidocaine and neosynephrine.  The bilateral nasal cavities were examined using a flexible fiberoptic laryngoscope.  There were no nasal cavity masses, polyps, or mucopurulence bilaterally. The nasopharynx had a normal appearance with normal Eustachian tube openings and fossa of Rosenmuller bilaterally.  Minimal adenoid tissue.  There is some mild posterior oropharyngeal cobblestoning that extends to the piriforms.  The base of tongue, vallecula, epiglottis, aryepiglottic folds, arytenoids, and piriform sinuses were without mass or lesion.  The bilateral true vocal folds were symmetrically mobile without nodules or masses.  The visualized portions of the infraglottic and subglottic airway are unremarkable.  The scope was removed.  The patient tolerated the procedure well.    Assessment and Plan    ICD-10-CM    1. Dysphonia R49.0 LARYNGOSCOPY FLEX  FIBEROPTIC, DIAGNOSTIC     SPEECH THERAPY REFERRAL     nicotine (NICOTROL) 10 MG inhaler   2. History of gastroesophageal reflux (GERD) Z87.19 LARYNGOSCOPY FLEX FIBEROPTIC, DIAGNOSTIC     SPEECH THERAPY REFERRAL     nicotine (NICOTROL) 10 MG inhaler   3. Tobacco dependence syndrome F17.200 LARYNGOSCOPY FLEX FIBEROPTIC, DIAGNOSTIC     SPEECH THERAPY REFERRAL     nicotine (NICOTROL) 10 MG inhaler   4. Encounter for tobacco use cessation counseling Z71.6 LARYNGOSCOPY FLEX FIBEROPTIC, DIAGNOSTIC     SPEECH THERAPY REFERRAL     nicotine (NICOTROL) 10 MG inhaler      It was my pleasure seeing Abhijeet Mccauley today in clinic.  The patient presented today with hoarseness but no evidence of infection, inflammation, or neoplasm on exam to explain the symptoms.  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  We discussed the importance of tobacco cessation.  I do think he benefit from a trial of speech therapy.  I provided him with a prescription for the Nicotrol inhaler.  The patient will make an appointment to see speech therapy for good voice use and vocal hygiene techniques.  The patient will return as needed.     Kenton Barron MD  Department of Otolarygology-Head and Neck Surgery  [facility]      Again, thank you for allowing me to participate in the care of your patient.        Sincerely,        Kenton Barron MD

## 2019-10-16 NOTE — NURSING NOTE
"Chief Complaint   Patient presents with     Hoarse     going on for months - sometimes it gets worse where patient can hardly talk       Initial /64 (BP Location: Left arm, Patient Position: Chair, Cuff Size: Adult Regular)   Pulse 73   Temp 98.6  F (37  C) (Oral)   Resp 16   Wt 84.8 kg (187 lb)   BMI 30.18 kg/m   Estimated body mass index is 30.18 kg/m  as calculated from the following:    Height as of 7/29/19: 1.676 m (5' 6\").    Weight as of this encounter: 84.8 kg (187 lb).  Medications and allergies reviewed.    Sivan MENDES CMA (AAMA)    "

## 2019-10-16 NOTE — PATIENT INSTRUCTIONS
Per physician instructions.    If you have questions or concerns on any instructions given to you by your provider today or if you need to schedule an appointment, you can reach us at [phone].    Thank you!

## 2019-10-17 ENCOUNTER — TELEPHONE (OUTPATIENT)
Dept: OTOLARYNGOLOGY | Facility: CLINIC | Age: 59
End: 2019-10-17

## 2019-10-17 NOTE — TELEPHONE ENCOUNTER
Prior Authorization Retail Medication Request    Medication/Dose: Nicotrol 10mg Inhaler  ICD code (if different than what is on RX):    Previously Tried and Failed:    Rationale:      Insurance Name:  [employer]  Insurance ID:  [id number]      Pharmacy Information (if different than what is on RX)  Name:    Phone:      ThanksLeonor  Certified Pharmacy Technician  [facility]  [phone]

## 2019-10-18 NOTE — TELEPHONE ENCOUNTER
PA Initiation    Medication: Nicotrol 10mg Inhaler  Insurance Company: [employer] - Phone [phone] Fax [phone]  Pharmacy Filling the Rx: [facility] [place], [place] - [address] [place] [address]  Filling Pharmacy Phone: [phone]  Filling Pharmacy Fax:    Start Date: 10/18/2019    Central Prior Authorization Team   Phone: [phone]

## 2019-10-21 ENCOUNTER — TELEPHONE (OUTPATIENT)
Dept: GASTROENTEROLOGY | Facility: CLINIC | Age: 59
End: 2019-10-21

## 2019-10-21 NOTE — TELEPHONE ENCOUNTER
Prior Authorization Approval    Authorization Effective Date: 9/18/2019  Authorization Expiration Date: 10/17/2020  Medication: Nicotrol 10mg Inhaler  Approved Dose/Quantity: 168  Reference #: [id number]   Insurance Company: [employer] - Phone [phone] Fax [phone]  Which Pharmacy is filling the prescription (Not needed for infusion/clinic administered): [facility] [place], [place] - [address] [place] [address]  Pharmacy Notified: Yes  Patient Notified: Yes **Instructed pharmacy to notify patient when script is ready to /ship.**

## 2019-10-23 ENCOUNTER — PRE VISIT (OUTPATIENT)
Dept: GASTROENTEROLOGY | Facility: CLINIC | Age: 59
End: 2019-10-23

## 2019-11-06 ENCOUNTER — TELEPHONE (OUTPATIENT)
Dept: PODIATRY | Facility: CLINIC | Age: 59
End: 2019-11-06

## 2019-11-06 ENCOUNTER — TELEPHONE (OUTPATIENT)
Dept: FAMILY MEDICINE | Facility: CLINIC | Age: 59
End: 2019-11-06

## 2019-11-06 NOTE — TELEPHONE ENCOUNTER
Reason for call:  Patient reporting a symptom    Symptom or request: Patient is calling stating his tongue has been getting swollen and sore throats. Cant chew anything cause it is on fire.    Duration (how long have symptoms been present): the last month    Have you been treated for this before? No      Phone Number patient can be reached at:  Home number on file [phone] (home)    Best Time:  any    Can we leave a detailed message on this number:  YES    Call taken on 11/6/2019 at 12:41 PM by Karen Menjivar

## 2019-11-06 NOTE — TELEPHONE ENCOUNTER
Patient reports:  Everyday, he has tongue swelling and sore throat.  Difficulty chewing do to pain for the last month.  New dentures placed one month ago, patient notice these changes after denture placement and uses adhesive each day.  Patient may be having these symptoms to denture adhesive, he uses fixodent.  Patient denies shortness of air, chest pain, swelling of throat, swelling of lips, swelling of throat.  Patient refused going to UC or ED  Advised patient if symptoms of shortness of air, chest pain, swelling of throat, swelling of lips, swelling of throat present he should call 911.  Patient verbalized understanding and scheduled appt on 11/11/19 for evaluation.    Racquel ESTRADA Rn

## 2019-11-11 ENCOUNTER — OFFICE VISIT (OUTPATIENT)
Dept: FAMILY MEDICINE | Facility: CLINIC | Age: 59
End: 2019-11-11
Payer: COMMERCIAL

## 2019-11-11 VITALS
TEMPERATURE: 97.9 F | DIASTOLIC BLOOD PRESSURE: 60 MMHG | OXYGEN SATURATION: 98 % | HEIGHT: 66 IN | BODY MASS INDEX: 29.33 KG/M2 | HEART RATE: 74 BPM | SYSTOLIC BLOOD PRESSURE: 100 MMHG | WEIGHT: 182.5 LBS

## 2019-11-11 DIAGNOSIS — D64.9 ANEMIA, UNSPECIFIED TYPE: ICD-10-CM

## 2019-11-11 DIAGNOSIS — K70.30 ALCOHOLIC CIRRHOSIS OF LIVER WITHOUT ASCITES (H): ICD-10-CM

## 2019-11-11 DIAGNOSIS — B37.0 ORAL THRUSH: Primary | ICD-10-CM

## 2019-11-11 LAB
ABO + RH BLD: NORMAL
ABO + RH BLD: NORMAL
BLD GP AB SCN SERPL QL: NORMAL
BLD PROD TYP BPU: NORMAL
BLOOD BANK CMNT PATIENT-IMP: NORMAL
HGB BLD-MCNC: 6.9 G/DL (ref 13.3–17.7)
NUM BPU REQUESTED: 1
SPECIMEN EXP DATE BLD: NORMAL

## 2019-11-11 PROCEDURE — 86901 BLOOD TYPING SEROLOGIC RH(D): CPT | Performed by: NURSE PRACTITIONER

## 2019-11-11 PROCEDURE — 36415 COLL VENOUS BLD VENIPUNCTURE: CPT | Performed by: NURSE PRACTITIONER

## 2019-11-11 PROCEDURE — 85018 HEMOGLOBIN: CPT | Performed by: NURSE PRACTITIONER

## 2019-11-11 PROCEDURE — 86922 COMPATIBILITY TEST ANTIGLOB: CPT | Performed by: NURSE PRACTITIONER

## 2019-11-11 PROCEDURE — 86900 BLOOD TYPING SEROLOGIC ABO: CPT | Performed by: NURSE PRACTITIONER

## 2019-11-11 PROCEDURE — 99214 OFFICE O/P EST MOD 30 MIN: CPT | Performed by: NURSE PRACTITIONER

## 2019-11-11 PROCEDURE — 86850 RBC ANTIBODY SCREEN: CPT | Performed by: NURSE PRACTITIONER

## 2019-11-11 RX ORDER — NYSTATIN 100000/ML
500000 SUSPENSION, ORAL (FINAL DOSE FORM) ORAL 4 TIMES DAILY
Qty: 200 ML | Refills: 0 | Status: SHIPPED | OUTPATIENT
Start: 2019-11-11 | End: 2020-02-19

## 2019-11-11 RX ORDER — HEPARIN SODIUM,PORCINE 10 UNIT/ML
5 VIAL (ML) INTRAVENOUS
Status: CANCELLED | OUTPATIENT
Start: 2019-11-11

## 2019-11-11 RX ORDER — HEPARIN SODIUM (PORCINE) LOCK FLUSH IV SOLN 100 UNIT/ML 100 UNIT/ML
5 SOLUTION INTRAVENOUS
Status: CANCELLED | OUTPATIENT
Start: 2019-11-11

## 2019-11-11 ASSESSMENT — ANXIETY QUESTIONNAIRES
6. BECOMING EASILY ANNOYED OR IRRITABLE: NEARLY EVERY DAY
4. TROUBLE RELAXING: MORE THAN HALF THE DAYS
2. NOT BEING ABLE TO STOP OR CONTROL WORRYING: NEARLY EVERY DAY
5. BEING SO RESTLESS THAT IT IS HARD TO SIT STILL: SEVERAL DAYS
1. FEELING NERVOUS, ANXIOUS, OR ON EDGE: SEVERAL DAYS
7. FEELING AFRAID AS IF SOMETHING AWFUL MIGHT HAPPEN: NEARLY EVERY DAY
3. WORRYING TOO MUCH ABOUT DIFFERENT THINGS: MORE THAN HALF THE DAYS
GAD7 TOTAL SCORE: 15

## 2019-11-11 ASSESSMENT — MIFFLIN-ST. JEOR: SCORE: 1580.56

## 2019-11-11 ASSESSMENT — PATIENT HEALTH QUESTIONNAIRE - PHQ9: SUM OF ALL RESPONSES TO PHQ QUESTIONS 1-9: 16

## 2019-11-11 NOTE — PATIENT INSTRUCTIONS
Pain can be due to oral thrush.    Nystatin solution 5 ml swish and swallow 4 times daily for 10 days   For sore spots in your mouth try orabase paste for mouth sores

## 2019-11-11 NOTE — PROGRESS NOTES
"Subjective     Abhijeet Mccauley is a 58 year old male who presents to clinic today for the following health issues: complains of pain in his mouth, sore throat symptoms started about a month ago after he got partial dentures.  The patient also asking for hemoglobin check, he has history of rectal bleed and anemia requiring blood transfusion. He denies current signs and symptoms of bleeding, denies dizziness, palpitations, but complains of ongoing chronic shortness of breath.  Denies abdominal pain, melena and blood in stools.     HPI   Tongue Swelling       Duration: one month     Description (location/character/radiation): patient reports he got partial dentures last month and ever since his tongue has been swelling. Uses adhesive twcie daily called Fixodent.     Intensity:  moderate    Accompanying signs and symptoms: Wakes up in the morning and has been having trouble swallowing and breathing. Also has a sore throat.     History (similar episodes/previous evaluation): None    Precipitating or alleviating factors: None    Therapies tried and outcome: None       Reviewed and updated as needed this visit by Provider  Allergies         Review of Systems   ROS COMP: Constitutional, HEENT, cardiovascular, pulmonary, gi and gu systems are negative, except as otherwise noted.      Objective    /60 (BP Location: Right arm, Patient Position: Sitting, Cuff Size: Adult Large)   Pulse 74   Temp 97.9  F (36.6  C) (Tympanic)   Ht 1.676 m (5' 6\")   Wt 82.8 kg (182 lb 8 oz)   SpO2 98%   BMI 29.46 kg/m    Body mass index is 29.46 kg/m .  Physical Exam   GENERAL: healthy, alert and no distress  HENT: normal cephalic/atraumatic, oral mucous membranes moist, there are few small white lesions, spots on the roof of the mouth, gums and right side of the tongue  NECK: no adenopathy, no asymmetry, masses, or scars and thyroid normal to palpation  CV: regular rate and rhythm, normal S1 S2, no S3 or S4, no murmur, click or " rub, no peripheral edema and peripheral pulses strong  ABDOMEN: soft, non-tender   PSYCH: mentation appears normal, affect normal/bright    Diagnostic Test Results:  Labs reviewed in Epic  Results for orders placed or performed in visit on 11/11/19 (from the past 24 hour(s))   Hemoglobin   Result Value Ref Range    Hemoglobin 6.9 (LL) 13.3 - 17.7 g/dL           Assessment & Plan     1. Oral thrush    - nystatin (MYCOSTATIN) 357246 UNIT/ML suspension; Swish and swallow 5 mLs (500,000 Units) in mouth 4 times daily for 10 days  Dispense: 200 mL; Refill: 0  - benzocaine (ORABASE) 20 % PSTE paste; Take by mouth 4 times daily as needed for other (mouth pain)  Dispense: 1 Tube; Refill: 0    2. Anemia, unspecified type  -hemoglobin level low, 6.9, recommended to transfuse 1 unit, this can be scheduled sometimes this week since he is stable and asymptomatic.   -patient is asymptomatic except for chronic shortness of breath. Denies melena, rectal bleeding, nausea, vomiting and abdominal pain  - Hemoglobin  - CBC with platelets; Future in 1 week  - ABO/Rh type and screen    3. Alcoholic cirrhosis of liver without ascites (H)  -recheck Hb in 1 week        See Patient Instructions    Return in about 1 week (around 11/18/2019) for Lab Work.    LOPEZ Ramey [facility]

## 2019-11-12 ASSESSMENT — ANXIETY QUESTIONNAIRES: GAD7 TOTAL SCORE: 15

## 2019-11-13 ENCOUNTER — OFFICE VISIT (OUTPATIENT)
Dept: AUDIOLOGY | Facility: CLINIC | Age: 59
End: 2019-11-13
Payer: COMMERCIAL

## 2019-11-13 DIAGNOSIS — H90.3 SENSORINEURAL HEARING LOSS, ASYMMETRICAL: Primary | ICD-10-CM

## 2019-11-13 PROCEDURE — V5010 ASSESSMENT FOR HEARING AID: HCPCS | Performed by: AUDIOLOGIST

## 2019-11-13 PROCEDURE — 99207 ZZC NO CHARGE LOS: CPT | Performed by: AUDIOLOGIST

## 2019-11-13 NOTE — PROGRESS NOTES
AUDIOLOGY REPORT    SUBJECTIVE: Abhijeet Mccauley is a 58 year old male who was seen in the Audiology Clinic at the [facility] on 11/13/2019  for a hearing aid check. Abhijeet was fit with bilateral Phonak Bolero B50-R BTE hearing aids and custom earmolds 4/2019. His hearing was assessed on 2/19/2019 and results revealed mild and moderate sensorineural hearing loss in the right ear and  borderline-normal, mild, moderate and severe sensorineural hearing loss in the left ear.    Today he reports that his hearing aid tubing is blocked with cerumen.     OBJECTIVE: Both tubes were hardened and blocked with cerumen. Additionally, the tone hook filters were out of place. The tubing and tone hooks were replaced. Abhijeet reported improved sound quality.    No charge visit today (in warranty hearing aid check).     ASSESSMENT: A follow-up appointment for hearing aid fitting was completed today.     PLAN: Abhijeet will return as needed for hearing aid care. He was instructed on when to return for a tubing change.    Michael Payan.  [place], [place] #[id number]

## 2019-11-15 ENCOUNTER — INFUSION THERAPY VISIT (OUTPATIENT)
Dept: INFUSION THERAPY | Facility: CLINIC | Age: 59
End: 2019-11-15
Attending: INTERNAL MEDICINE
Payer: COMMERCIAL

## 2019-11-15 ENCOUNTER — HOSPITAL ENCOUNTER (OUTPATIENT)
Dept: LAB | Facility: CLINIC | Age: 59
Discharge: HOME | End: 2019-11-15
Attending: INTERNAL MEDICINE | Admitting: NURSE PRACTITIONER
Payer: COMMERCIAL

## 2019-11-15 VITALS
DIASTOLIC BLOOD PRESSURE: 53 MMHG | RESPIRATION RATE: 18 BRPM | TEMPERATURE: 97.6 F | SYSTOLIC BLOOD PRESSURE: 126 MMHG | HEART RATE: 72 BPM

## 2019-11-15 DIAGNOSIS — D64.9 NORMOCYTIC ANEMIA: Primary | ICD-10-CM

## 2019-11-15 PROCEDURE — 86901 BLOOD TYPING SEROLOGIC RH(D): CPT | Performed by: NURSE PRACTITIONER

## 2019-11-15 PROCEDURE — 86900 BLOOD TYPING SEROLOGIC ABO: CPT | Performed by: NURSE PRACTITIONER

## 2019-11-15 PROCEDURE — 36415 COLL VENOUS BLD VENIPUNCTURE: CPT | Performed by: NURSE PRACTITIONER

## 2019-11-15 PROCEDURE — 86922 COMPATIBILITY TEST ANTIGLOB: CPT | Performed by: NURSE PRACTITIONER

## 2019-11-15 PROCEDURE — 86850 RBC ANTIBODY SCREEN: CPT | Performed by: NURSE PRACTITIONER

## 2019-11-15 ASSESSMENT — PAIN SCALES - GENERAL: PAINLEVEL: NO PAIN (0)

## 2019-11-15 NOTE — PROGRESS NOTES
Infusion Nursing Note:  Abhijeet Mccauley presents today for 1 unit PRBCs.    Patient seen by provider today: No   present during visit today: Not Applicable.    Note: Patient has a history of antibodies and did not want to wait for additional testing on his type and screen. Patient will return on Monday 11/18/2019 for 1 unit PRBCs.    Patient complained of burning during urination. He has a history of UTI's. Offered to call patients MD to obtain an order for urine culture; Patient declined, he did not want to leave a urine sample and stated he would talk to his MD on Monday about it.     Intravenous Access:  Peripheral IV placed.    Treatment Conditions:  Lab Results   Component Value Date    HGB 6.9 11/11/2019     Lab Results   Component Value Date    WBC Canceled, Test credited 09/30/2019    WBC 8.1 09/30/2019      Lab Results   Component Value Date    ANEU Canceled, Test credited 09/30/2019    ANEU 3.9 09/30/2019     Lab Results   Component Value Date     09/30/2019    Results reviewed, labs MET treatment parameters, ok to proceed with treatment.  Consent from 12/18/2018    Post Infusion Assessment:  Site patent and intact, free from redness, edema or discomfort.  No evidence of extravasations.  Access discontinued per protocol.     Discharge Plan:   Discharge instructions reviewed with: Patient.  Patient and/or family verbalized understanding of discharge instructions and all questions answered.  Patient will return 11/18/2019for next appointment.   Patient discharged in stable condition accompanied by: self.  Departure Mode: Ambulatory.    Deneen Christianson RN

## 2019-11-18 ENCOUNTER — OFFICE VISIT (OUTPATIENT)
Dept: UROLOGY | Facility: CLINIC | Age: 59
End: 2019-11-18
Attending: INTERNAL MEDICINE
Payer: COMMERCIAL

## 2019-11-18 ENCOUNTER — INFUSION THERAPY VISIT (OUTPATIENT)
Dept: INFUSION THERAPY | Facility: CLINIC | Age: 59
End: 2019-11-18
Attending: INTERNAL MEDICINE
Payer: COMMERCIAL

## 2019-11-18 VITALS
TEMPERATURE: 97.5 F | DIASTOLIC BLOOD PRESSURE: 56 MMHG | HEART RATE: 70 BPM | RESPIRATION RATE: 16 BRPM | OXYGEN SATURATION: 99 % | SYSTOLIC BLOOD PRESSURE: 128 MMHG

## 2019-11-18 VITALS
RESPIRATION RATE: 16 BRPM | OXYGEN SATURATION: 99 % | HEART RATE: 71 BPM | TEMPERATURE: 97.7 F | SYSTOLIC BLOOD PRESSURE: 116 MMHG | DIASTOLIC BLOOD PRESSURE: 50 MMHG

## 2019-11-18 DIAGNOSIS — D64.9 NORMOCYTIC ANEMIA: Primary | ICD-10-CM

## 2019-11-18 DIAGNOSIS — N20.0 KIDNEY STONE: Primary | ICD-10-CM

## 2019-11-18 LAB
ALBUMIN UR-MCNC: NEGATIVE MG/DL
APPEARANCE UR: ABNORMAL
BACTERIA #/AREA URNS HPF: ABNORMAL /HPF
BILIRUB UR QL STRIP: NEGATIVE
BLD PROD TYP BPU: NORMAL
BLD UNIT ID BPU: 0
BLOOD PRODUCT CODE: NORMAL
BPU ID: NORMAL
COLOR UR AUTO: YELLOW
GLUCOSE UR STRIP-MCNC: NEGATIVE MG/DL
HGB UR QL STRIP: ABNORMAL
KETONES UR STRIP-MCNC: NEGATIVE MG/DL
LEUKOCYTE ESTERASE UR QL STRIP: ABNORMAL
NITRATE UR QL: NEGATIVE
NON-SQ EPI CELLS #/AREA URNS LPF: ABNORMAL /LPF
PH UR STRIP: 6 PH (ref 5–7)
RBC #/AREA URNS AUTO: ABNORMAL /HPF
SOURCE: ABNORMAL
SP GR UR STRIP: 1.01 (ref 1–1.03)
TRANSFUSION STATUS PATIENT QL: NORMAL
TRANSFUSION STATUS PATIENT QL: NORMAL
UROBILINOGEN UR STRIP-ACNC: 0.2 EU/DL (ref 0.2–1)
WBC #/AREA URNS AUTO: ABNORMAL /HPF

## 2019-11-18 PROCEDURE — P9016 RBC LEUKOCYTES REDUCED: HCPCS

## 2019-11-18 PROCEDURE — 81001 URINALYSIS AUTO W/SCOPE: CPT | Performed by: UROLOGY

## 2019-11-18 PROCEDURE — 36430 TRANSFUSION BLD/BLD COMPNT: CPT

## 2019-11-18 PROCEDURE — 51798 US URINE CAPACITY MEASURE: CPT | Performed by: UROLOGY

## 2019-11-18 PROCEDURE — 87086 URINE CULTURE/COLONY COUNT: CPT | Performed by: UROLOGY

## 2019-11-18 PROCEDURE — 87088 URINE BACTERIA CULTURE: CPT | Performed by: UROLOGY

## 2019-11-18 PROCEDURE — 87186 SC STD MICRODIL/AGAR DIL: CPT | Performed by: UROLOGY

## 2019-11-18 PROCEDURE — 99203 OFFICE O/P NEW LOW 30 MIN: CPT | Mod: 25 | Performed by: UROLOGY

## 2019-11-18 NOTE — PROGRESS NOTES
Infusion Nursing Note:  Abhijeet Mccauley presents today for I unit PRBCs.    Patient seen by provider today: No   present during visit today: Not Applicable.    Note: Informed consent to receive blood products signed 12/5/18    Intravenous Access:  Peripheral IV placed.    Treatment Conditions:  Lab Results   Component Value Date    HGB 6.9 11/11/2019     Lab Results   Component Value Date    WBC Canceled, Test credited 09/30/2019    WBC 8.1 09/30/2019      Lab Results   Component Value Date    ANEU Canceled, Test credited 09/30/2019    ANEU 3.9 09/30/2019     Lab Results   Component Value Date     09/30/2019      Results reviewed, labs MET treatment parameters, ok to proceed with treatment.      Post Infusion Assessment:  Patient tolerated infusion without incident.  Blood return noted pre and post infusion.  Site patent and intact, free from redness, edema or discomfort.  No evidence of extravasations.  Access discontinued per protocol.       Discharge Plan:   Patient discharged in stable condition accompanied by: self.  Departure Mode: Ambulatory.    Joaquin Parra RN

## 2019-11-18 NOTE — NURSING NOTE
Active order to obtain bladder scan? Yes   Name of ordering provider:  Dr Patton  Bladder scan preformed post void Yes  Bladder scan reveled 24ML  Provider notified?  Yes    Jenise CARLOS MA

## 2019-11-18 NOTE — NURSING NOTE
"Chief Complaint   Patient presents with     Consult For     Staghorn Calculus       Initial /50   Pulse 71   Temp 97.7  F (36.5  C)   Resp 16   SpO2 99%  Estimated body mass index is 29.46 kg/m  as calculated from the following:    Height as of 11/11/19: 1.676 m (5' 6\").    Weight as of 11/11/19: 82.8 kg (182 lb 8 oz).  BP completed using cuff size: large  Medications and allergies reviewed.      Jenise CARLOS MA    "

## 2019-11-19 NOTE — PROGRESS NOTES
Appointment source: New Patient  Patient name: Abhijeet Mccauley  Urology Staff: Norm Patton MD    Subjective: This is a 58 year old year old male with a history of left renal lithiasis    Stone has been presumably growing in size since about 2009 and would be seen on his chest CT that were part of his follow up of acute myelogenous leukemia. He also has a host of other medical issues including cirrhosis and a coagulopathy.    The stone is not symptomatic and has an appearance suggesting possible calcification of renal tissue in addition to stone mass.    Has a history of UTI but denies significant bladder outlet obstruction voiding symptoms.    Objective:  Examination:    Healthy male  HEENT: normal extraocular movements  Respiratory: normal, non labored breathing  Musculoskeletal:Normal muscular movements  Skin normal temperature, no rash  Psychiatric: appropriate affect    Abdomen obese but otherwise benign  No evidence of inguinal hernia  No evidence of inguinal adenopathy  Phallus without lesion.   Scrotal contents normal  Rectal examination normal  Prostate benign to palpation    Post void residual 24 mL    UA suggestive of cystitis. Culture pending.    No record of prior PSA studies. Ordered today.     Assessment:  Upper pole, non symptomatic, left renal calcification present for about 10 years.     Plan:  Evaluation by Dr. Hale for consideration of intervention.    Total time 30 minutes, counseling 20 minutes discussing management of renal calcifications.

## 2019-11-20 ENCOUNTER — PATIENT OUTREACH (OUTPATIENT)
Dept: CARE COORDINATION | Facility: CLINIC | Age: 59
End: 2019-11-20

## 2019-11-20 ASSESSMENT — ACTIVITIES OF DAILY LIVING (ADL): DEPENDENT_IADLS:: TRANSPORTATION

## 2019-11-20 NOTE — PROGRESS NOTES
Clinic Care Coordination Contact  [facility]/Voicemail    Referral Source: PCP    Clinical Data: Care Coordinator Outreach    Outreach attempted x 1.  [facility] unable to leave a message as voicemail box is full.     Plan: Care Coordinator will send unable to contact letter with care coordinator contact information via mail. Care Coordinator will try to reach patient again in 10 business days.    Leonor Ahmadi [facility]  Primary Care Clinic- Social Work Care Coordinator  [facility]  11/20/2019 3:51 PM  [phone]

## 2019-11-20 NOTE — LETTER
[facility] CARE COORDINATION  [address] [place] [address]      November 20, 2019    Abhijeet Mccauley  [address] [place] [id number]      Dear Abhijeet,    I have been attempting to reach you since our last contact. I would like to continue to work with you and provide any additional support you may need on achieving your health care related goals. I would appreciate if you would give me a call at [phone] to let me know if you would like to continue working together. I know that there are many things that can affect our ability to communicate and I hope we can continue to work together.    All of us at the [facility] are invested in your health and are here to assist you in meeting your goals.     Sincerely,    RUDY Cummings

## 2019-11-21 LAB
BACTERIA SPEC CULT: ABNORMAL
Lab: ABNORMAL
SPECIMEN SOURCE: ABNORMAL

## 2019-11-22 DIAGNOSIS — Z87.440 PERSONAL HISTORY OF URINARY TRACT INFECTION: Primary | ICD-10-CM

## 2019-11-22 RX ORDER — SULFAMETHOXAZOLE/TRIMETHOPRIM 800-160 MG
1 TABLET ORAL 2 TIMES DAILY
Qty: 14 TABLET | Refills: 0 | Status: SHIPPED | OUTPATIENT
Start: 2019-11-22 | End: 2019-12-05

## 2019-11-22 NOTE — PROGRESS NOTES
Has a urinary tract infection sensitive to trimethoprim sulfamethoxazole. Prescription sent to [facility]

## 2019-11-26 ENCOUNTER — TELEPHONE (OUTPATIENT)
Dept: FAMILY MEDICINE | Facility: CLINIC | Age: 59
End: 2019-11-26

## 2019-11-27 NOTE — TELEPHONE ENCOUNTER
[facility] DME for 4 wheel walker form completed and faxed to Eleanor Allen at [phone]

## 2019-12-03 ENCOUNTER — PATIENT OUTREACH (OUTPATIENT)
Dept: CARE COORDINATION | Facility: CLINIC | Age: 59
End: 2019-12-03

## 2019-12-03 ASSESSMENT — ACTIVITIES OF DAILY LIVING (ADL): DEPENDENT_IADLS:: TRANSPORTATION

## 2019-12-03 NOTE — PROGRESS NOTES
Clinic Care Coordination Contact  Care Team Conversations    Return phone call for out of office .    Patient will wanted to know about getting Meals on Wheels and said that the regular  was helping get nursing services in the home.  Patient does have a meeting on Friday with his county  and encourage patient to talk to them about both of these issues.  Patient is looking for someone else to pay for the Meals on Wheels and that is why he was referred back to his County .    Patient had no other concerns or questions.  Will have regular  give him a call next week.    RUDY Hsieh, [facility] - Care Coordinator   [facility]  12/3/2019   8:32 AM  [phone]

## 2019-12-05 ENCOUNTER — ANCILLARY PROCEDURE (OUTPATIENT)
Dept: GENERAL RADIOLOGY | Facility: CLINIC | Age: 59
End: 2019-12-05
Attending: NURSE PRACTITIONER
Payer: COMMERCIAL

## 2019-12-05 ENCOUNTER — OFFICE VISIT (OUTPATIENT)
Dept: FAMILY MEDICINE | Facility: CLINIC | Age: 59
End: 2019-12-05
Payer: COMMERCIAL

## 2019-12-05 VITALS
HEIGHT: 66 IN | BODY MASS INDEX: 30.2 KG/M2 | RESPIRATION RATE: 18 BRPM | WEIGHT: 187.9 LBS | OXYGEN SATURATION: 98 % | HEART RATE: 69 BPM | TEMPERATURE: 97.9 F | SYSTOLIC BLOOD PRESSURE: 108 MMHG | DIASTOLIC BLOOD PRESSURE: 54 MMHG

## 2019-12-05 DIAGNOSIS — N30.00 ACUTE CYSTITIS WITHOUT HEMATURIA: Primary | ICD-10-CM

## 2019-12-05 DIAGNOSIS — K62.5 RECTAL BLEEDING: ICD-10-CM

## 2019-12-05 DIAGNOSIS — M25.552 HIP PAIN, LEFT: ICD-10-CM

## 2019-12-05 DIAGNOSIS — B37.0 ORAL THRUSH: ICD-10-CM

## 2019-12-05 DIAGNOSIS — Z87.440 PERSONAL HISTORY OF URINARY TRACT INFECTION: ICD-10-CM

## 2019-12-05 LAB
ALBUMIN UR-MCNC: NEGATIVE MG/DL
APPEARANCE UR: CLEAR
BACTERIA #/AREA URNS HPF: ABNORMAL /HPF
BILIRUB UR QL STRIP: NEGATIVE
COLOR UR AUTO: YELLOW
ERYTHROCYTE [DISTWIDTH] IN BLOOD BY AUTOMATED COUNT: 21.1 % (ref 10–15)
GLUCOSE UR STRIP-MCNC: NEGATIVE MG/DL
HCT VFR BLD AUTO: 26.2 % (ref 40–53)
HGB BLD-MCNC: 7.6 G/DL (ref 13.3–17.7)
HGB UR QL STRIP: ABNORMAL
KETONES UR STRIP-MCNC: NEGATIVE MG/DL
LEUKOCYTE ESTERASE UR QL STRIP: ABNORMAL
MCH RBC QN AUTO: 25.7 PG (ref 26.5–33)
MCHC RBC AUTO-ENTMCNC: 29 G/DL (ref 31.5–36.5)
MCV RBC AUTO: 89 FL (ref 78–100)
NITRATE UR QL: NEGATIVE
NON-SQ EPI CELLS #/AREA URNS LPF: ABNORMAL /LPF
PH UR STRIP: 5.5 PH (ref 5–7)
PLATELET # BLD AUTO: 156 10E9/L (ref 150–450)
RBC # BLD AUTO: 2.96 10E12/L (ref 4.4–5.9)
RBC #/AREA URNS AUTO: ABNORMAL /HPF
SOURCE: ABNORMAL
SP GR UR STRIP: <=1.005 (ref 1–1.03)
UROBILINOGEN UR STRIP-ACNC: 0.2 EU/DL (ref 0.2–1)
WBC # BLD AUTO: 8.6 10E9/L (ref 4–11)
WBC #/AREA URNS AUTO: ABNORMAL /HPF

## 2019-12-05 PROCEDURE — 81001 URINALYSIS AUTO W/SCOPE: CPT | Performed by: NURSE PRACTITIONER

## 2019-12-05 PROCEDURE — 72170 X-RAY EXAM OF PELVIS: CPT

## 2019-12-05 PROCEDURE — 99214 OFFICE O/P EST MOD 30 MIN: CPT | Performed by: NURSE PRACTITIONER

## 2019-12-05 PROCEDURE — 85027 COMPLETE CBC AUTOMATED: CPT | Performed by: NURSE PRACTITIONER

## 2019-12-05 PROCEDURE — 87086 URINE CULTURE/COLONY COUNT: CPT | Performed by: NURSE PRACTITIONER

## 2019-12-05 PROCEDURE — 36415 COLL VENOUS BLD VENIPUNCTURE: CPT | Performed by: NURSE PRACTITIONER

## 2019-12-05 RX ORDER — CEPHALEXIN 500 MG/1
500 CAPSULE ORAL 3 TIMES DAILY
Qty: 21 CAPSULE | Refills: 0 | Status: SHIPPED | OUTPATIENT
Start: 2019-12-05 | End: 2020-02-19

## 2019-12-05 ASSESSMENT — MIFFLIN-ST. JEOR: SCORE: 1605.06

## 2019-12-05 NOTE — PATIENT INSTRUCTIONS
Recommend Xray to check your hip.  CBC  Urine positive for infection, start Keflex 500 mg 3 times daily for 7 days.

## 2019-12-05 NOTE — PROGRESS NOTES
Subjective     Abhijeet Mccauley is a 58 year old male who presents to clinic today for the following health issues:    Chief Complaint   Patient presents with     Musculoskeletal Problem     left hip pain      RECHECK     Oral thrush, was seen on 11/11        HPI   Hip Pain       Duration: one week         Specific cause: none    Description:   Location of pain: left hip  Character of pain: sharp and constant  Pain radiation:radiates into the left buttocks and radiates into the left leg  New numbness or weakness in legs, not attributed to pain:  no     Intensity: Currently 10/10    History:   Pain interferes with job: Not applicable  History of back problems: YES   Any previous MRI or X-rays: YES  Sees a specialist for back pain:  No  Therapies tried without relief: Baclofen, Gabapentin     Alleviating factors:   Improved by: none       Precipitating factors:  Worsened by: Walking      Accompanying Signs & Symptoms:  Risk of Fracture:  None  Risk of Cauda Equina:  None  Risk of Infection:  None  Risk of Cancer:  None  Risk of Ankylosing Spondylitis:  Onset at age <35, male, AND morning back stiffness. no     Thrush       Duration: one month +     Description (location/character/radiation): States he still has a sore throat at times, tongue still feels swollen and It is hard to swallow at times has been needing to lisp.     Intensity:  moderate    History (similar episodes/previous evaluation): YES was seen on 11/11     Precipitating or alleviating factors: None    Therapies tried and outcome: Nystatin suspension, benzocaine        Reviewed and updated as needed this visit by Provider  Tobacco  Allergies  Meds  Problems  Med Hx  Surg Hx  Fam Hx         Review of Systems   ROS COMP: Constitutional, HEENT, cardiovascular, pulmonary, gi and gu systems are negative, except as otherwise noted.      Objective    /54 (BP Location: Right arm, Patient Position: Sitting, Cuff Size: Adult Regular)   Pulse 69    "Temp 97.9  F (36.6  C) (Tympanic)   Resp 18   Ht 1.676 m (5' 6\")   Wt 85.2 kg (187 lb 14.4 oz)   SpO2 98%   BMI 30.33 kg/m    Body mass index is 30.33 kg/m .  Physical Exam   GENERAL: healthy, alert and no distress  HENT: oropharynx clear and oral mucous membranes moist  CV: regular rate and rhythm, normal S1 S2, no S3 or S4, no murmur, click or rub, no peripheral edema and peripheral pulses strong  MS: no gross musculoskeletal defects noted, no edema. Negative straight leg test. Left hip anterior tenderness   SKIN: no suspicious lesions or rashes  NEURO: Normal strength and tone, mentation intact and speech normal  PSYCH: mentation appears normal, affect normal/bright    Diagnostic Test Results:  Labs reviewed in Epic  Results for orders placed or performed in visit on 12/05/19 (from the past 24 hour(s))   UA reflex to Microscopic and Culture   Result Value Ref Range    Color Urine Yellow     Appearance Urine Clear     Glucose Urine Negative NEG^Negative mg/dL    Bilirubin Urine Negative NEG^Negative    Ketones Urine Negative NEG^Negative mg/dL    Specific Gravity Urine <=1.005 1.003 - 1.035    Blood Urine Trace (A) NEG^Negative    pH Urine 5.5 5.0 - 7.0 pH    Protein Albumin Urine Negative NEG^Negative mg/dL    Urobilinogen Urine 0.2 0.2 - 1.0 EU/dL    Nitrite Urine Negative NEG^Negative    Leukocyte Esterase Urine Moderate (A) NEG^Negative    Source Midstream Urine    Urine Microscopic   Result Value Ref Range    WBC Urine 5-10 (A) OTO5^0 - 5 /HPF    RBC Urine O - 2 OTO2^O - 2 /HPF    Squamous Epithelial /LPF Urine Few FEW^Few /LPF    Bacteria Urine Few (A) NEG^Negative /HPF   Urine Culture Aerobic Bacterial   Result Value Ref Range    Specimen Description Midstream Urine     Special Requests Specimen received in preservative     Culture Micro PENDING    CBC with platelets   Result Value Ref Range    WBC 8.6 4.0 - 11.0 10e9/L    RBC Count 2.96 (L) 4.4 - 5.9 10e12/L    Hemoglobin 7.6 (LL) 13.3 - 17.7 g/dL    " Hematocrit 26.2 (L) 40.0 - 53.0 %    MCV 89 78 - 100 fl    MCH 25.7 (L) 26.5 - 33.0 pg    MCHC 29.0 (L) 31.5 - 36.5 g/dL    RDW 21.1 (H) 10.0 - 15.0 %    Platelet Count 156 150 - 450 10e9/L           Assessment & Plan     1. Acute cystitis without hematuria    - UA reflex to Microscopic and Culture  - Urine Microscopic  - Urine Culture Aerobic Bacterial  - cephALEXin (KEFLEX) 500 MG capsule; Take 1 capsule (500 mg) by mouth 3 times daily for 7 days  Dispense: 21 capsule; Refill: 0    2. Rectal bleeding  -hemoglobin level low, but stable, the patient is asymptomatic  -complains of recurrent on and off rectal bleeding, recommended colonoscopy and follow up with GI  -repeat Hemoglobin on Monday next week   - CBC with platelets  - GASTROENTEROLOGY ADULT REF PROCEDURE ONLY  - GASTROENTEROLOGY ADULT REF CONSULT ONLY  - Hemoglobin FUTURE anytime; Future    3. Personal history of urinary tract infection  -UA positive for UTI, started Keflex, urine culture pending   - UA reflex to Microscopic and Culture    4. Hip pain, left  -Xray normal, possibly due to bursitis, recommended follow up with ortho   - XR Pelvis 1/2 Views; Future  - ORTHO  REFERRAL    5. Oral thrush  -resolved        See Patient Instructions    Return in about 4 days (around 12/9/2019) for Lab Work.    LOPEZ Ramey [facility]

## 2019-12-06 LAB
BACTERIA SPEC CULT: NORMAL
Lab: NORMAL
SPECIMEN SOURCE: NORMAL

## 2019-12-06 ASSESSMENT — ASTHMA QUESTIONNAIRES: ACT_TOTALSCORE: 9

## 2019-12-07 NOTE — TELEPHONE ENCOUNTER
RECORDS RECEIVED FROM: [facility]- LOPEZ Simon CNP  DX: Rectal Bleeding    DATE RECEIVED: 1/27/2020   NOTES STATUS DETAILS   OFFICE NOTE from referring provider  Internal 12/5/19 Office visit with LOPEZ Simon CNP      OFFICE NOTE from other specialist   Internal 7/12/19 Nurse Triage    DISCHARGE SUMMARY from hospital  N/A    DISCHARGE REPORT from the ER N/A    OPERATIVE REPORT  Internal Colonoscopy: 3/18/18  EGD: 2/8/18   MEDICATION LIST Internal    LABS     PFC TESTING N/A    ANAL PAP N/A    BIOPSIES/PATHOLOGY RELATED TO DIAGNOSIS Care Everywhere    DIAGNOSTIC PROCEDURES     COLONOSCOPY Internal/ Care Everywhere 3/18/18  5/26/15, 6/27/14 ([facility])    UPPER ENDOSCOPY (EGD) Internal/ Care Everywhere EGD: 3/18/18, 2/8/18  EGD: 1/11/18, 5/25/15, 11/17/14 ([facility])   FLEX SIGMOIDOSCOPY  N/A    ERCP N/A    IMAGING (DISC & REPORT)      CT  Internal CT Abdomen Pelvis: 10/3/19    [facility]:  - CT Chest Abdomen Pelvis: 11/5/14 (archived into PACS 12/11)   MRI N/A    XRAY Internal XR Pelvis: 12/5/19   ULTRASOUND (ENDOANAL/ENDORECTAL) Care Everywhere [facility]:  - US Abdomen: 1/11/18, 2/10/16, 5/24/15  US Abdomen Liver: 11/20/14  - NM GI Bleed: 5/23/15*    *12/11 Images from [facility] has been archived into PACS.       12/7/19 6:43am Fax request sent to [facility] ([phone]) for all images noted above. -Eyal

## 2019-12-12 ENCOUNTER — PATIENT OUTREACH (OUTPATIENT)
Dept: CARE COORDINATION | Facility: CLINIC | Age: 59
End: 2019-12-12

## 2019-12-12 ASSESSMENT — ACTIVITIES OF DAILY LIVING (ADL): DEPENDENT_IADLS:: TRANSPORTATION

## 2019-12-12 NOTE — PROGRESS NOTES
Addendum:  SW received return call from pt's [facility] , Eleanor.  Eleanor informed this writer:    1.  Pt was recently approved for CADI services via [facility] & has a new case manger, Jennifer DISLA, that will be assisting pt with in-home service needs, such as   a.) Meals on Wheels;    b.) in-home modifications (walk-in shower and a hand railing for outside) and    c.) PCA/homemaking services.  Also discussed a referral to housing case management with Jennifer.    2.  Pt is eligible for a new program with [employer] & will get 1 free box of food each month to help offset his expenses.      3.  The pt also needs a 4-wheel walker which is not covered under his insurance.  Medicare and Medicaid both have it coded as not medically necessary. Eleanor got the DME Exception Request form back from his PCP, with a signed GITA for Eleanor to send it to [facility] in [place] (they are very familiar with [facility]'s process and DME exception requests).  If [facility] does not approve the exception request, then CADI will cover the cost.      4.  Eleanor shared that she is listed as the Authorized Representative with [facility] for all of his Medical/Cash/Food Benefits & works very closely with pt's financial worker to ensure his benefits are not termed for any reason.    Jennifer's contact information is added to pt's care team tab.    Leonor Ortiz, Clinic Care Coordination-Social Work  [phone]    Clinic Care Coordination Contact    Follow Up Progress Note      Assessment: [facility] placed call to pt today for follow up care coordination services.  SW introduced self & title.  Pt agreed to speak with this writer.    Pt shared that he continues to attend appointments as recommended & already has his ride set up for next week's appointments.      Pt shared he met with Eleanor, his contracted services , last week to discuss moving to an apartment that has running water & better utilities.  Pt also  "interested in Meals on Wheels & stated that Eleanor is working on getting them set up.    SW placed call to Eleanor, left a message & requested a return call.      Goals addressed this encounter:   Goals Addressed                 This Visit's Progress       Patient Stated      Psychosocial (pt-stated)   On track     Goal Statement: I will attend my medical appointments as recommended  Measure of Success: Pt will be aware of why he is scheduled & will attend medical appointments  Supportive Steps to Achieve: CC to assist pt with understanding of his medical cares & reasons for appointments  Barriers: Pt stated he has limited transportation & ability to attend appointments  Strengths: Pt wishes to remain in his own home, motivated to remain healthy  Date to Achieve By: March 1, 2020  Patient expressed understanding of goal: Yes    Update 9-4-19:  PCP recommends GI & Hematology appointments, however, pt wishes to \"get over his cold before doing anything else.\"  Update 10-2-19:  SW and pt scheduled above appointments.  Pt will have several follow ups and requested we keep this goal.  12-12-19:  Pt continues to have several medical appointments.  Keeping this goal helps keep him focused, he states.                 Intervention/Education provided during outreach: Discussed upcoming appointments, living environment & in home services that would be beneficial to him.     Outreach Frequency: monthly    Plan:   Pt to attend appointments next week  Pt to continue to work with Eleanor & this writer for services  Care Coordinator will follow up in 1 month, or when Eleanor returns my call.    Leonor Avila  Primary Care Clinic- Social Work Care Coordinator  [facility]  12/12/2019 3:18 PM  [phone]    "

## 2019-12-18 ENCOUNTER — HOSPITAL ENCOUNTER (OUTPATIENT)
Dept: SPEECH THERAPY | Facility: CLINIC | Age: 59
Setting detail: THERAPIES SERIES
End: 2019-12-18
Attending: OTOLARYNGOLOGY
Payer: COMMERCIAL

## 2019-12-18 PROCEDURE — 92507 TX SP LANG VOICE COMM INDIV: CPT | Mod: GN | Performed by: SPEECH-LANGUAGE PATHOLOGIST

## 2019-12-18 PROCEDURE — 92524 BEHAVRAL QUALIT ANALYS VOICE: CPT | Mod: GN | Performed by: SPEECH-LANGUAGE PATHOLOGIST

## 2019-12-19 NOTE — PROGRESS NOTES
[facility]          OUTPATIENT SPEECH LANGUAGE PATHOLOGY VOICE EVALUATION  PLAN OF TREATMENT FOR OUTPATIENT REHABILITATION  (COMPLETE FOR INITIAL CLAIMS ONLY)    Patient's Last Name, First Name, M.I.  YOB: 1960  Garrick,Abhijeet  SAURAV                        Provider s Name: [facility] Medical Record No.  [id number]     Onset Date:  10/16/19    Start of Care Date:  12/18/19   Type:     ___PT  __OT   _X_SLP    Medical Diagnosis: Dysphonia   Speech Language Pathology Diagnosis:  Moderate Dysphonia    Visits from SOC: 1      _________________________________________________________________________________  Plan of Treatment/Functional Goals:  Voice  Vocal hygiene, SOVT exercise, deactivation stretches, resonant voice therapy      Goals     1. Goal Identifier: Vocal Hygiene       Goal Description: Pt will demonstrate understanding of vocal hygiene verbally across 3 sessions.       Target Date: 03/17/20   2. Goal Identifier: Deactivation Stretches       Goal Description: Pt will complete deactivation stretches with minimal cues across 3 sessions.       Target Date: 03/17/20   3. Goal Identifier: SOVT       Goal Description: Pt will complete SOVT execises with mod-min cues across 3 sessions.       Target Date: 03/17/20   4. Goal Identifier: Resonant voice       Goal Description: Pt will utilize forward resonance to reduce laryngeal tension 60% of the session across 3 sessions.       Target Date: 03/17/20   5. Goal Identifier: Breath Support       Goal Description: Pt will demonstrate appropriate use of breath support throughout 70% of the session across 3 sessions.       Target Date: 03/17/20            Frequency and Duration: 2xs/ a month for 3 months  Leticia Gordon SLP       I CERTIFY THE NEED FOR THESE SERVICES FURNISHED UNDER        THIS PLAN OF TREATMENT AND WHILE UNDER MY CARE      (Physician co-signature of this document indicates review and certification of the therapy plan).                Certification Date From:  12/18/19  Certification Date To:  02/16/20                 Initial Assessment        See Epic Evaluation Start of Care

## 2019-12-19 NOTE — PROGRESS NOTES
Voice Evaluation  12/18/19   General Information   Type Of Visit Initial   Start Of Care Date 12/18/19   Referring Physician Kenton Barron MD   Orders Evaluate And Treat   Orders Comment Dysphonia, GERD   Medical Diagnosis Dysphonia   Onset Of Illness/injury Or Date Of Surgery 10/16/19  (order date)   Precautions/Limitations  no known precautions/limitations   Hearing Hearing aids   Avocational voice uses Talking to family and friends;    Surgical/Medical history reviewed Yes   Pertinent History Of Current Problem Per ENT visit note 10/16/19: Patient has multiple reasons that can be contributing to his dysphonia including his history of gastroesophageal reflux, his history of COPD with inhaler use, his continued tobacco use, and some presbylarynx/voice changes with age.  We discussed the importance of tobacco cessation.  I do think he benefit from a trial of speech therapy.  I provided him with a prescription for the Nicotrol inhaler.  The patient will make an appointment to see speech therapy for good voice use and vocal hygiene techniquesPt is a smoker.  PMH includes COPD   Prior Level of Functioning Same problem relapsing/remitting.   Prior Level Of Function Comment Pt reports dysphonia has been intermittent the past 8-9 years since his treatment for leukemia.    Patient Role/employment History Disabled   Living environment [facility]   General Observations Pt pleasant throughout evaluation.  Required repetition of questions and examples to answer questions utilizing a scale (e.g. pain scale).   Patient/family Goals To try to get my voice back.   General Information Comments Pt referred for voice evaluation following a visit to his ENT.  Pt reports that he has had periodic hoarse voice for the past 8-9 years following treatment for leukemia. Fiberoptic Endoscopic evaluation was unremarkable.  Pt has history of GERD, COPD with inhailer, and tobacco use.  Pt completed the VHI and scored 95 which is  indicative of severe.    Fall Risk Screen   Fall screen completed by SLP   Have you fallen 2 or more times in the past year? Yes   Have you fallen and had an injury in the past year? Yes   Fall screen comments Patient reports he has an appointment schedule to see a PT next week.   Abuse Screen (yes response referral indicated)   Feels Unsafe at Home or Work/School no   Feels Threatened by Someone no   Does Anyone Try to Keep You From Having Contact with Others or Doing Things Outside Your Home? no   Physical Signs of Abuse Present no   Pain Assessment   Pain Reported Yes   Pain Location neck/throat   Pain Scale 8/10   Comments Pt did not give consistent answers when asked regarding the pain scale.    Personal Rating / Voice Use Rating   Describe the amount of voice use required for your job Moderate   Describe the amount of typical non-work voice use Moderate   Describe the intensity of typical non-work voice use Soft and quiet   Evaluation Results   Voice Observations Pt present with moderate Dysphonia characterized by moderately hoarse and strained vocal quality. Noted decreased pitch and volume. He frequently demonstrates increased press/tension in his voice as he over-extends his breath supply.     Voice Profile during conversation, 1 min monologue and paragraph reading   Voice Quality Rough;Hoarse;Breathy   Voice quality comments Pt presents with mild-moderate hoarse voice this date.  Strain and pressed voice quality fluctuated during examination.  Patient noted to continue to speak passed his supportive level of air capacity.    Breath control Tense;Tight   Breath Control comments Speaks past capacity which increases strain and tension.   Voice Use / Effort Pinched / squeezed larynx;Hard glottal attacks;Throat push   Resonance WNL   Comments Pt presents with a moderate dysphonia this date.  Pt reports voice quality fluctuates.  Noted tension, and over-extension of breath support.   Vibratory Function of Vocal  "Folds   Prolonged 'ah' at mid pitch (sec) 2.51   Prolonged 'ah' at high pitch (sec) 2.18   Prolonged 'ah' at low pitch (sec) 2.56   Vibratory Function of Vocal Folds Scale Males 20 - 80 yrs: 15 - 25 secs   Vibratory Function of Vocal Folds Comments Pt has reduced vibratory function.   Mucosal Function of the Vocal Folds   S/Z ratio (__ sec/ __sec = __percentage) 3.83   Respiratory Function Screening   Longest prolonged \"S\" from S/Z ratio (#of sec) 3.83   Longest prolonged \"S\" characteristics Other  (\"s\" clear and with appropriate volume. Duration ~10 seconds)   General Therapy Interventions   Planned Therapy Interventions Voice   Voice Breath flow to sound flow;Resonant voice techniques;Voice quality/pitch or volume tasks   Intervention Comments Vocal hygiene, SOVT exercise, deactivation stretches, resonant voice therapy   Impressions and Recommendations   Communication Diagnosis Moderate Dysphonia   Recommendations Recommend voice therapy program focusing on vocal hygiene, SOVT exercise, deactivation stretches, resonant voice therapy.   Frequency and Duration 2xs/ a month for 3 months   Prognosis  Good with intervention   Risks and Benefits of Treatment have been explained. Yes   Patient & /or Caregiver  in agreement with plan of care Yes   Patient Education Anatomy and physiology of voice, vocal hygiene, introduced SOVT exercises   Educational Assessment   Barriers to Learning Hearing;Cognitive   Voice Goal 1   Goal Identifier Vocal Hygiene   Goal Description Pt will demonstrate understanding of vocal hygiene verbally across 3 sessions.   Target Date 03/17/20   Voice Goal 2   Goal Identifier Deactivation Stretches   Goal Description Pt will complete deactivation stretches with minimal cues across 3 sessions.   Target Date 03/17/20   Voice Goal 3   Goal Identifier SOVT   Goal Description Pt will complete SOVT execises with mod-min cues across 3 sessions.   Target Date 03/17/20   Voice Goal 4   Goal Identifier " Resonant voice   Goal Description Pt will utilize forward resonance to reduce laryngeal tension 60% of the session across 3 sessions.   Target Date 03/17/20   Voice Goal 5   Goal Identifier Breath Support   Goal Description Pt will demonstrate appropriate use of breath support throughout 70% of the session across 3 sessions.   Target Date 03/17/20   Total Session Time   Voice Minutes (22827) 50   Total Evaluation Time 50   Therapy Certification   Certification date from 12/18/19   Certification date to 02/16/20   Medical Diagnosis Dysphonia   Certification I certify the need for these services furnished under this plan of treatment and while under my care.  (Physician co-signature of this document indicates review and certification of the therapy plan).

## 2019-12-20 ENCOUNTER — ANCILLARY PROCEDURE (OUTPATIENT)
Dept: GENERAL RADIOLOGY | Facility: CLINIC | Age: 59
End: 2019-12-20
Attending: PEDIATRICS
Payer: COMMERCIAL

## 2019-12-20 ENCOUNTER — OFFICE VISIT (OUTPATIENT)
Dept: ORTHOPEDICS | Facility: CLINIC | Age: 59
End: 2019-12-20
Payer: COMMERCIAL

## 2019-12-20 VITALS
SYSTOLIC BLOOD PRESSURE: 109 MMHG | HEIGHT: 66 IN | DIASTOLIC BLOOD PRESSURE: 55 MMHG | BODY MASS INDEX: 30.05 KG/M2 | WEIGHT: 187 LBS

## 2019-12-20 DIAGNOSIS — M54.16 LUMBAR RADICULOPATHY: Primary | ICD-10-CM

## 2019-12-20 DIAGNOSIS — M25.551 ACUTE RIGHT HIP PAIN: ICD-10-CM

## 2019-12-20 DIAGNOSIS — M54.16 LUMBAR RADICULOPATHY: ICD-10-CM

## 2019-12-20 DIAGNOSIS — M25.552 BILATERAL HIP PAIN: ICD-10-CM

## 2019-12-20 DIAGNOSIS — M25.551 BILATERAL HIP PAIN: ICD-10-CM

## 2019-12-20 PROCEDURE — 73502 X-RAY EXAM HIP UNI 2-3 VIEWS: CPT

## 2019-12-20 PROCEDURE — 99244 OFF/OP CNSLTJ NEW/EST MOD 40: CPT | Performed by: PEDIATRICS

## 2019-12-20 PROCEDURE — 72100 X-RAY EXAM L-S SPINE 2/3 VWS: CPT

## 2019-12-20 ASSESSMENT — MIFFLIN-ST. JEOR: SCORE: 1595.98

## 2019-12-20 NOTE — PATIENT INSTRUCTIONS
Plan:  - Today's Plan of Care:  MRI of Lumbar and Pelvis - Call [phone] to schedule MRI    -We also discussed other future treatment options:  Referral to Physical Therapy, consideratio of injections    Follow Up: In clinic with Dr. Santos after MRI (wait at least 1-2 days)    If you have any further questions for your physician or physician s care team you can call [phone] and use option 3 to leave a voice message. Calls received during business hours will be returned same day.

## 2019-12-20 NOTE — LETTER
12/20/2019         RE: Abhijeet Mccauley  [address] [place] [id number]        Dear Colleague,    Thank you for referring your patient, Abhijeet Mccauley, to the [facility]. Please see a copy of my visit note below.    Sports Medicine Clinic Visit    PCP: Chidi Valenzuela    Abhijeet Mccauley is a 59 year old male who is seen  in consultation at the request of Yessy Morley CNP presenting with left and right hip pain    Injury: He reports right and left hip pain for one month. He reports one month ago he fell off a deck landing on his right hip. He reports occasional lower back pain. He has pain in the left hip and groin and right lateral hip. He reports some numbness and weakness in the left leg to his toes. He reports his hips feel unstable. Has had prior left leg/back issues treated with injections.    Location of Pain: bilateral hips and left leg  Duration of Pain: 1 month(s)  Rating of Pain at worst: 10/10  Rating of Pain Currently: 9/10  Symptoms are better with: Nothing  Symptoms are worse with: sitting, standing, stairs and walking  Additional Features:   Positive: numbness and weakness   Negative: instability, paresthesias, numbness and weakness  Other evaluation and/or treatments so far consists of: Ice and Rest  Prior History of related problems: Chronic back pain, leukemia     Social History: retired    Review of Systems  Skin: no bruising, no swelling  Musculoskeletal: as above  Neurologic: yes numbness, paresthesias - left leg  Remainder of review of systems is negative including constitutional, CV, pulmonary, GI, except as noted in HPI or medical history.    Patient's current problem list, past medical and surgical history, and family history were reviewed.    Patient Active Problem List   Diagnosis     Essential hypertension     Acute myeloid leukemia in remission (H)     Sensorineural hearing loss, asymmetrical     Alcoholic cirrhosis of liver (H)      Acute alcoholic hepatitis     Coagulation defect (H)     Osteoarthrosis     Thrombocytopenia (H)     Universal ulcerative (chronic) colitis(556.6) (H)     CKD (chronic kidney disease) stage 3, GFR 30-59 ml/min (H)     Rosacea     Mild intermittent asthma without complication     Peptic ulcer disease     Uncomplicated alcohol dependence (H)     Tobacco dependence syndrome     Tubular adenoma of colon     Normocytic anemia     Leukocytosis with increased monocytes     Generalized weakness     COPD exacerbation (H)     AIDP (acute inflammatory demyelinating polyneuropathy) (H)     Primary osteoarthritis of left knee     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)     s/p chemo, no bone marrow transplant     Chronic obstructive pulmonary disease 5/17/2018     COPD (chronic obstructive pulmonary disease) (H)      History of pulmonary embolus (PE)      Hypertension      PUD (peptic ulcer disease)      Tobacco dependence      Ulcerative colitis (H)      Past Surgical History:   Procedure Laterality Date     AS TOTAL KNEE ARTHROPLASTY Left      BONE MARROW BIOPSY, BONE SPECIMEN, NEEDLE/TROCAR N/A 6/12/2018    Procedure: BIOPSY BONE MARROW;  Bone Marrow Biopsy;  Surgeon: Demar Sapp MD;  Location: [facility]     ESOPHAGOSCOPY, GASTROSCOPY, DUODENOSCOPY (EGD), COMBINED N/A 2/8/2018    Procedure: COMBINED ESOPHAGOSCOPY, GASTROSCOPY, DUODENOSCOPY (EGD), BIOPSY SINGLE OR MULTIPLE;  gastroscopy with biopsies;  Surgeon: Raz Cobb MD;  Location: [facility]     ESOPHAGOSCOPY, GASTROSCOPY, DUODENOSCOPY (EGD), COMBINED N/A 3/18/2018    Procedure: COMBINED ESOPHAGOSCOPY, GASTROSCOPY, DUODENOSCOPY (EGD);;  Surgeon: Raz Cobb MD;  Location: [facility]     LACERATION REPAIR Right     Right leg     PHACOEMULSIFICATION WITH STANDARD INTRAOCULAR LENS IMPLANT Left 7/1/2019     "Procedure: cataract removal with implant.;  Surgeon: Adrian Oliveira MD;  Location: [facility]     PHACOEMULSIFICATION WITH STANDARD INTRAOCULAR LENS IMPLANT Right 7/29/2019    Procedure: Cataract removal with implant.;  Surgeon: Adrian Oliveira MD;  [facility]: [facility]     Family History   Problem Relation Age of Onset     Hypertension Mother      Coronary Artery Disease Father         MI       Objective  /55   Ht 1.676 m (5' 6\")   Wt 84.8 kg (187 lb)   BMI 30.18 kg/m       GENERAL APPEARANCE: healthy, alert and no distress   GAIT: NORMAL  SKIN: no suspicious lesions or rashes  HEENT: Sclera clear, anicteric  CV: good peripheral pulses  RESP: Breathing not labored  NEURO: Normal strength and tone, mentation intact and speech normal  PSYCH:  mentation appears normal and affect normal/bright    Bilateral hip exam  Inspection:      no edema or ecchymosis in hip area    Tender:      greater trochanter bilateral    Non Tender:      remainder of the hip area bilateral    ROM:     Full active and passive ROM  Bilateral  - pain with ROM on the left    Strength:      flexion 4/5 bilateral       abduction 4/5 bilateral       adduction 4/5 bilateral    Sensation:      grossly intact in hip and thigh    Special Tests:      positive (+) PIERRE left       positive (+) FADIR left    Low back exam:  Inspection:     no visible deformity in the low back       normal skin       normal vascular       normal lymphatic       no asymmetry    Posture:      normal    Foot Inspection:     no deformity noted    Tender:     midline       paraspinal muscles     Left SI joint    Non Tender:     remainder of lumbar spine    ROM:      limited flexion due to pain       limited extension due to pain    Strength:     hip flexion 5/5 bilateral       knee extension 5/5 bilateral       ankle dorsiflexion 5/5 bilateral       ankle plantarflexion 5/5 bilateral       dorsiflexion of the great toe 5/5 bilateral       able to heel and toe " walk    Reflexes:     patellar (L3, L4) symmetric normal       achilles tendons (S1) symmetric normal    Sensation:    grossly intact throughout lower extremities - some numbness on the left    Special tests:      straight leg raise left positive        straight leg raise right negative    Radiology  I ordered, visualized and reviewed these images with the patient  Xr Pelvis And Hip Right 1 View  Result Date: 12/20/2019  PELVIS AND HIP RIGHT ONE VIEW 12/20/2019 11:27 AM HISTORY: Acute right hip pain.   IMPRESSION: No apparent pelvic or right hip fracture. Hip joint space widths appear within normal limits. Extensive iliac and femoral artery calcification is noted. ALFONZO GUERRA MD    Xr Lumbar Spine 2/3 Views  Result Date: 12/20/2019  LUMBAR SPINE TWO - THREE VIEWS 12/20/2019 11:27 AM COMPARISON: Lumbar spine CT 2/25/2019. HISTORY: Lumbar radiculopathy.   IMPRESSION: Chronic moderate anterior wedge compression deformity of the L1 vertebral body again noted. Vertebral body heights of the lumbar spine are otherwise normal. There is no evidence for recent fracture of the lumbar spine. Alignment of the lumbar vertebrae is normal. There is degenerative facet arthropathy at the L3-L4, L4-L5 and L5-S1 levels.    Assessment:  1. Lumbar radiculopathy    2. Bilateral hip pain      Pain likely multifactorial, lumbar radiculopathy, SI joint, greater trochanteric pain syndrome likely all contributing.  We discussed imaging first step, could consider further treatment pending clinical course.    Plan:  - Today's Plan of Care:  MRI of Lumbar and Pelvis - Call [phone] to schedule MRI    -We also discussed other future treatment options:  Referral to Physical Therapy, consideratio of injections    Follow Up: In clinic with Dr. Santos after MRI (wait at least 1-2 days)    Concerning signs and symptoms were reviewed.  The patient expressed understanding of this management plan and all questions were answered at this  time.    Thanks for the opportunity to participate in the care of this patient, I will keep you updated on their progress.    CC: Yessy Santos MD CA  Primary Care Sports Medicine  [facility]    Again, thank you for allowing me to participate in the care of your patient.        Sincerely,        Tati Santos MD

## 2019-12-20 NOTE — RESULT ENCOUNTER NOTE
These results were discussed during office visit.    Tati Santos MD, CAQ  Primary Care Sports Medicine  [facility]

## 2019-12-20 NOTE — PROGRESS NOTES
Sports Medicine Clinic Visit    PCP: Chidi Valenzuela    Abhijeet Mccauley is a 59 year old male who is seen  in consultation at the request of Yessy Morley CNP presenting with left and right hip pain    Injury: He reports right and left hip pain for one month. He reports one month ago he fell off a deck landing on his right hip. He reports occasional lower back pain. He has pain in the left hip and groin and right lateral hip. He reports some numbness and weakness in the left leg to his toes. He reports his hips feel unstable. Has had prior left leg/back issues treated with injections.    Location of Pain: bilateral hips and left leg  Duration of Pain: 1 month(s)  Rating of Pain at worst: 10/10  Rating of Pain Currently: 9/10  Symptoms are better with: Nothing  Symptoms are worse with: sitting, standing, stairs and walking  Additional Features:   Positive: numbness and weakness   Negative: instability, paresthesias, numbness and weakness  Other evaluation and/or treatments so far consists of: Ice and Rest  Prior History of related problems: Chronic back pain, leukemia     Social History: retired    Review of Systems  Skin: no bruising, no swelling  Musculoskeletal: as above  Neurologic: yes numbness, paresthesias - left leg  Remainder of review of systems is negative including constitutional, CV, pulmonary, GI, except as noted in HPI or medical history.    Patient's current problem list, past medical and surgical history, and family history were reviewed.    Patient Active Problem List   Diagnosis     Essential hypertension     Acute myeloid leukemia in remission (H)     Sensorineural hearing loss, asymmetrical     Alcoholic cirrhosis of liver (H)     Acute alcoholic hepatitis     Coagulation defect (H)     Osteoarthrosis     Thrombocytopenia (H)     Universal ulcerative (chronic) colitis(556.6) (H)     CKD (chronic kidney disease) stage 3, GFR 30-59 ml/min (H)     Rosacea     Mild intermittent asthma  without complication     Peptic ulcer disease     Uncomplicated alcohol dependence (H)     Tobacco dependence syndrome     Tubular adenoma of colon     Normocytic anemia     Leukocytosis with increased monocytes     Generalized weakness     COPD exacerbation (H)     AIDP (acute inflammatory demyelinating polyneuropathy) (H)     Primary osteoarthritis of left knee     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)     s/p chemo, no bone marrow transplant     Chronic obstructive pulmonary disease 5/17/2018     COPD (chronic obstructive pulmonary disease) (H)      History of pulmonary embolus (PE)      Hypertension      PUD (peptic ulcer disease)      Tobacco dependence      Ulcerative colitis (H)      Past Surgical History:   Procedure Laterality Date     AS TOTAL KNEE ARTHROPLASTY Left      BONE MARROW BIOPSY, BONE SPECIMEN, NEEDLE/TROCAR N/A 6/12/2018    Procedure: BIOPSY BONE MARROW;  Bone Marrow Biopsy;  Surgeon: Demar Sapp MD;  Location: [facility]     ESOPHAGOSCOPY, GASTROSCOPY, DUODENOSCOPY (EGD), COMBINED N/A 2/8/2018    Procedure: COMBINED ESOPHAGOSCOPY, GASTROSCOPY, DUODENOSCOPY (EGD), BIOPSY SINGLE OR MULTIPLE;  gastroscopy with biopsies;  Surgeon: Raz Cobb MD;  Location: [facility]     ESOPHAGOSCOPY, GASTROSCOPY, DUODENOSCOPY (EGD), COMBINED N/A 3/18/2018    Procedure: COMBINED ESOPHAGOSCOPY, GASTROSCOPY, DUODENOSCOPY (EGD);;  Surgeon: Raz Cobb MD;  Location: [facility]     LACERATION REPAIR Right     Right leg     PHACOEMULSIFICATION WITH STANDARD INTRAOCULAR LENS IMPLANT Left 7/1/2019    Procedure: cataract removal with implant.;  Surgeon: Adrian Oliveira MD;  [facility]: [facility]     PHACOEMULSIFICATION WITH STANDARD INTRAOCULAR LENS IMPLANT Right 7/29/2019    Procedure: Cataract removal with implant.;  Surgeon: Adrian Oliveira MD;   "Location: [facility]     Family History   Problem Relation Age of Onset     Hypertension Mother      Coronary Artery Disease Father         MI       Objective  /55   Ht 1.676 m (5' 6\")   Wt 84.8 kg (187 lb)   BMI 30.18 kg/m      GENERAL APPEARANCE: healthy, alert and no distress   GAIT: NORMAL  SKIN: no suspicious lesions or rashes  HEENT: Sclera clear, anicteric  CV: good peripheral pulses  RESP: Breathing not labored  NEURO: Normal strength and tone, mentation intact and speech normal  PSYCH:  mentation appears normal and affect normal/bright    Bilateral hip exam  Inspection:      no edema or ecchymosis in hip area    Tender:      greater trochanter bilateral    Non Tender:      remainder of the hip area bilateral    ROM:     Full active and passive ROM  Bilateral  - pain with ROM on the left    Strength:      flexion 4/5 bilateral       abduction 4/5 bilateral       adduction 4/5 bilateral    Sensation:      grossly intact in hip and thigh    Special Tests:      positive (+) PIERRE left       positive (+) FADIR left    Low back exam:  Inspection:     no visible deformity in the low back       normal skin       normal vascular       normal lymphatic       no asymmetry    Posture:      normal    Foot Inspection:     no deformity noted    Tender:     midline       paraspinal muscles     Left SI joint    Non Tender:     remainder of lumbar spine    ROM:      limited flexion due to pain       limited extension due to pain    Strength:     hip flexion 5/5 bilateral       knee extension 5/5 bilateral       ankle dorsiflexion 5/5 bilateral       ankle plantarflexion 5/5 bilateral       dorsiflexion of the great toe 5/5 bilateral       able to heel and toe walk    Reflexes:     patellar (L3, L4) symmetric normal       achilles tendons (S1) symmetric normal    Sensation:    grossly intact throughout lower extremities - some numbness on the left    Special tests:      straight leg raise left positive        straight leg " raise right negative    Radiology  I ordered, visualized and reviewed these images with the patient  Xr Pelvis And Hip Right 1 View  Result Date: 12/20/2019  PELVIS AND HIP RIGHT ONE VIEW 12/20/2019 11:27 AM HISTORY: Acute right hip pain.   IMPRESSION: No apparent pelvic or right hip fracture. Hip joint space widths appear within normal limits. Extensive iliac and femoral artery calcification is noted. ALFONZO GUERRA MD    Xr Lumbar Spine 2/3 Views  Result Date: 12/20/2019  LUMBAR SPINE TWO - THREE VIEWS 12/20/2019 11:27 AM COMPARISON: Lumbar spine CT 2/25/2019. HISTORY: Lumbar radiculopathy.   IMPRESSION: Chronic moderate anterior wedge compression deformity of the L1 vertebral body again noted. Vertebral body heights of the lumbar spine are otherwise normal. There is no evidence for recent fracture of the lumbar spine. Alignment of the lumbar vertebrae is normal. There is degenerative facet arthropathy at the L3-L4, L4-L5 and L5-S1 levels.    Assessment:  1. Lumbar radiculopathy    2. Bilateral hip pain      Pain likely multifactorial, lumbar radiculopathy, SI joint, greater trochanteric pain syndrome likely all contributing.  We discussed imaging first step, could consider further treatment pending clinical course.    Plan:  - Today's Plan of Care:  MRI of Lumbar and Pelvis - Call [phone] to schedule MRI    -We also discussed other future treatment options:  Referral to Physical Therapy, consideratio of injections    Follow Up: In clinic with Dr. Santos after MRI (wait at least 1-2 days)    Concerning signs and symptoms were reviewed.  The patient expressed understanding of this management plan and all questions were answered at this time.    Thanks for the opportunity to participate in the care of this patient, I will keep you updated on their progress.    CC: Yessy Santos MD [facility]  Primary Care Sports Medicine  [facility]

## 2019-12-23 NOTE — RESULT ENCOUNTER NOTE
These results were discussed during office visit.    Tati Santos MD, CAQ  Primary Care Sports Medicine  [facility]

## 2019-12-31 DIAGNOSIS — M62.838 MUSCLE SPASM: ICD-10-CM

## 2019-12-31 RX ORDER — BACLOFEN 10 MG/1
TABLET ORAL
Qty: 90 TABLET | Refills: 3 | Status: ON HOLD | OUTPATIENT
Start: 2019-12-31 | End: 2020-06-16

## 2019-12-31 NOTE — TELEPHONE ENCOUNTER
Requested Prescriptions   Pending Prescriptions Disp Refills     baclofen (LIORESAL) 10 MG tablet [Pharmacy Med Name: BACLOFEN 10MG TABS] 90 tablet 3     Sig: TAKE 1/2 TO 1 TABLET BY MOUTH UP TO THREE TIMES A DAY       There is no refill protocol information for this order              Last Written Prescription Date:  9/3/2019  Last Fill Quantity: 90,   # refills: 3  Last Office Visit: 12/5/2019 with Formjono   Future Office visit:    Next 5 appointments (look out 90 days)    Jan 07, 2020 12:40 PM CST  SHORT with Chidi Valenzuela MD  [facility] ([facility]  Arrive at: Clinic [address] [facility] [address]  [phone]           Routing refill request to provider for review/approval because:  Drug not on the [facility], [facility] or  Health refill protocol or controlled substance

## 2020-01-07 ENCOUNTER — HOSPITAL ENCOUNTER (OUTPATIENT)
Dept: MRI IMAGING | Facility: CLINIC | Age: 60
End: 2020-01-07
Attending: PEDIATRICS
Payer: COMMERCIAL

## 2020-01-07 ENCOUNTER — HOSPITAL ENCOUNTER (OUTPATIENT)
Dept: MRI IMAGING | Facility: CLINIC | Age: 60
Discharge: HOME | End: 2020-01-07
Attending: PEDIATRICS | Admitting: PEDIATRICS
Payer: COMMERCIAL

## 2020-01-07 DIAGNOSIS — M25.551 BILATERAL HIP PAIN: ICD-10-CM

## 2020-01-07 DIAGNOSIS — M25.552 BILATERAL HIP PAIN: ICD-10-CM

## 2020-01-07 DIAGNOSIS — M54.16 LUMBAR RADICULOPATHY: ICD-10-CM

## 2020-01-07 PROCEDURE — 72148 MRI LUMBAR SPINE W/O DYE: CPT

## 2020-01-07 PROCEDURE — 72195 MRI PELVIS W/O DYE: CPT

## 2020-01-09 NOTE — PROGRESS NOTES
Subjective     Abhijeet Mccauley is a 59 year old male who presents to clinic today for the following health issues:    HPI     Chief Complaint   Patient presents with     Results     Discuss MRI results, worseing numbness and lower leg pain.     Breathing Problem     Recheck ongoing breathing concerns, ongoing shortness of breath with mild activity -walking 20 feet. 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 He has been doing some strength training at home as tolerated.      RECHECK     Recheck hemoglobin        Abhijeet was last seen in  on 12/5 for rectal bleeding. Hgb was low stable at 7.6. Colonoscopy and GI follow-up were ordered with plan to recheck Hgb in 1 week.  I don't see that any of this was done yet.  He is scheduled with colorectal surgery on 1/27.  He cancelled his October visit with hematology.  Today, he reports he continues to have some blood in the stool.      MRI of the lumbar spine and pelvis was ordered by sports medicine on 1/7 as copied below.  It does not appear he was advised on results yet.  He was supposed to follow-up with sports med in clinic after the MRIs to discuss, but this is not scheduled.       Lumbar IMPRESSION:    1. Multilevel degenerative changes throughout the lumbar spine as  detailed above. Overall, no significant change since MR 3/9/2019.  2. Stable anterior compression deformity of the L1 vertebral body. No  new loss of vertebral body height.  3. No suspicious bony lesions.    Pelvic Impression:  1.  Bilateral, left greater than right, osteonecrosis of the femoral  heads. There appears to be collapse of the left femoral head, however  given large field-of-view of this difficult to assess. Dedicated hip  MRIs may be useful if clinically indicated.  2.  Severe left and moderate right hip osteoarthrosis.      He wants to have his ammonia level checked- he is concerned this is causing his leg pain.  Advised him I would not expect these to be  related.  Mentation seems to be at baseline, so I did not feel that ammonia level is indicated.     He continues to drink alcohol- 3 cocktails per day.        Patient Active Problem List   Diagnosis     Essential hypertension     Acute myeloid leukemia in remission (H)     Sensorineural hearing loss, asymmetrical     Alcoholic cirrhosis of liver (H)     Acute alcoholic hepatitis     Coagulation defect (H)     Osteoarthrosis     Thrombocytopenia (H)     Universal ulcerative (chronic) colitis(556.6) (H)     CKD (chronic kidney disease) stage 3, GFR 30-59 ml/min (H)     Rosacea     Mild intermittent asthma without complication     Peptic ulcer disease     Uncomplicated alcohol dependence (H)     Tobacco dependence syndrome     Tubular adenoma of colon     Normocytic anemia     Leukocytosis with increased monocytes     Generalized weakness     COPD exacerbation (H)     AIDP (acute inflammatory demyelinating polyneuropathy) (H)     Primary osteoarthritis of left knee     Past Surgical History:   Procedure Laterality Date     AS TOTAL KNEE ARTHROPLASTY Left      BONE MARROW BIOPSY, BONE SPECIMEN, NEEDLE/TROCAR N/A 6/12/2018    Procedure: BIOPSY BONE MARROW;  Bone Marrow Biopsy;  Surgeon: Demar Sapp MD;  Location: [facility]     ESOPHAGOSCOPY, GASTROSCOPY, DUODENOSCOPY (EGD), COMBINED N/A 2/8/2018    Procedure: COMBINED ESOPHAGOSCOPY, GASTROSCOPY, DUODENOSCOPY (EGD), BIOPSY SINGLE OR MULTIPLE;  gastroscopy with biopsies;  Surgeon: Raz Cobb MD;  Location: [facility]     ESOPHAGOSCOPY, GASTROSCOPY, DUODENOSCOPY (EGD), COMBINED N/A 3/18/2018    Procedure: COMBINED ESOPHAGOSCOPY, GASTROSCOPY, DUODENOSCOPY (EGD);;  Surgeon: Raz Cobb MD;  Location: [facility]     LACERATION REPAIR Right     Right leg     PHACOEMULSIFICATION WITH STANDARD INTRAOCULAR LENS IMPLANT Left 7/1/2019    Procedure: cataract removal with implant.;  Surgeon: Adrian Oliveira MD;  Location: [facility]     PHACOEMULSIFICATION WITH STANDARD  INTRAOCULAR LENS IMPLANT Right 7/29/2019    Procedure: Cataract removal with implant.;  Surgeon: Adrian Oliveira MD;  Location: [facility]       Social History     Tobacco Use     Smoking status: Current Every Day Smoker     Packs/day: 0.50     Years: 30.00     Pack years: 15.00     Types: Cigarettes     Smokeless tobacco: Never Used     Tobacco comment: 5 cigarettes a day    Substance Use Topics     Alcohol use: Yes     Comment: Down to 3 beers per day.  Sometimes a cocktail also.     Family History   Problem Relation Age of Onset     Hypertension Mother      Coronary Artery Disease Father         MI         Current Outpatient Medications   Medication Sig Dispense Refill     acamprosate (CAMPRAL) 333 MG EC tablet Take 2 tablets (666 mg) by mouth 3 times daily 180 tablet 11     baclofen (LIORESAL) 10 MG tablet TAKE 1/2 TO 1 TABLET BY MOUTH UP TO THREE TIMES A DAY 90 tablet 3     buPROPion (WELLBUTRIN SR) 150 MG 12 hr tablet Take once per day for 3 days and then increase to twice per day 60 tablet 12     furosemide (LASIX) 20 MG tablet Take 1 tablet (20 mg) by mouth every morning 90 tablet 3     gabapentin (NEURONTIN) 300 MG capsule Take 2 capsules (600 mg) by mouth 3 times daily 180 capsule 11     mometasone-formoterol (DULERA) 200-5 MCG/ACT oral inhaler Inhale 2 puffs into the lungs 2 times daily        nicotine (NICOTROL) 10 MG inhaler Inhale 6-16 Cartridges into the lungs daily as needed for smoking cessation 168 Cartridge 1     order for DME Equipment being ordered: 4 wheel walker 1 Units 0     order for DME Equipment being ordered: Manual wheelchair.  Estimated duration- 3 months 1 Units 0     order for DME Equipment being ordered: bed pull up bar 1 Units 0     order for DME Equipment being ordered: shower chair 1 Device 0     pantoprazole (PROTONIX) 40 MG EC tablet Take 1 tablet (40 mg) by mouth daily 90 tablet 3     POTASSIUM CHLORIDE PO        propranolol (INDERAL) 20 MG tablet Take 1 tablet (20 mg) by  mouth 2 times daily 180 tablet 3     rifaximin (XIFAXAN) 550 MG TABS tablet Take 1 tablet (550 mg) by mouth 2 times daily 180 tablet 3     SODIUM BICARBONATE PO Take 650 mg by mouth daily as needed        spironolactone (ALDACTONE) 25 MG tablet Take 1 tablet (25 mg) by mouth daily 90 tablet 3     tiotropium (SPIRIVA) 18 MCG capsule Inhale 1 capsule into the lungs daily Using PRN       traZODone (DESYREL) 50 MG tablet Take 1 tablet (50 mg) by mouth At Bedtime 90 tablet 3     albuterol (VENTOLIN HFA) 108 (90 Base) MCG/ACT inhaler Inhale 2 puffs into the lungs every 4 hours as needed for shortness of breath / dyspnea or wheezing 18 g 11     benzocaine (ORABASE) 20 % PSTE paste Take by mouth 4 times daily as needed for other (mouth pain) (Patient not taking: Reported on 1/10/2020) 1 Tube 0     cholestyramine (QUESTRAN) 4 g packet Take 1 packet (4 g) by mouth 2 times daily (with meals) (Patient not taking: Reported on 11/11/2019) 60 each 11     desonide (DESOWEN) 0.05 % external lotion Apply topically as needed (Patient not taking: Reported on 11/11/2019) 118 mL 1     loratadine (CLARITIN) 10 MG tablet Take 1 tablet (10 mg) by mouth daily as needed for allergies (Patient not taking: Reported on 10/16/2019) 30 tablet 11     montelukast (SINGULAIR) 10 MG tablet Take 1 tablet (10 mg) by mouth At Bedtime (Patient not taking: Reported on 11/11/2019) 90 tablet 3     order for DME Equipment being ordered: 2 pairs thigh high compression stockings, strength per patient preference (Patient not taking: Reported on 1/10/2020) 2 Units 1     order for DME Equipment being ordered: Compression Stockings TWO (2) Pairs, 15-20 mm Hg. (Patient not taking: Reported on 9/10/2019) 2 Package prn     order for DME Equipment being ordered: Wheelchair (Patient not taking: Reported on 9/10/2019) 1 each 0     vitamin B complex with vitamin C (STRESS TAB) tablet Take 1 tablet by mouth daily (Patient not taking: Reported on 1/10/2020) 90 tablet 3      "vitamin B1 (THIAMINE) 100 MG tablet Take 1 tablet (100 mg) by mouth daily (Patient not taking: Reported on 9/30/2019) 90 tablet 3     Allergies   Allergen Reactions     Blood Transfusion Related (Informational Only)      Patient has a history of a clinically significant antibody against RBC antigens.  A delay in compatible RBCs may occur.     Famotidine Unknown and Other (See Comments)     Severe abdominal cramps     Cyclobenzaprine Hives     Methocarbamol Other (See Comments)     Made pt's \"face break out\"     Pepcid Cramps     Severe abdominal cramps     Vancomycin Other (See Comments)     hallucinations     Vfend Other (See Comments)     IV - cold sweats, Iron tablet makes him nauseated and stomach ached     Hydrocodone-Acetaminophen Rash       Reviewed and updated as needed this visit by Provider         Review of Systems   ROS COMP: Constitutional, GI ystems are negative, except as otherwise noted.      Objective    /70   Pulse 74   Temp 97.8  F (36.6  C) (Tympanic)   Resp 14   Ht 1.676 m (5' 6\")   Wt 87.5 kg (193 lb)   SpO2 98%   BMI 31.15 kg/m    Body mass index is 31.15 kg/m .  Physical Exam   GENERAL: appears fatigued, alert and no distress  MS: walking slowly with a cane    Diagnostic Test Results:  Labs reviewed in Epic  Results for orders placed or performed in visit on 01/10/20 (from the past 24 hour(s))   Hemoglobin   Result Value Ref Range    Hemoglobin 7.3 (L) 13.3 - 17.7 g/dL           Assessment & Plan     1. Anemia, unspecified type    Hgb is stable but still rather low at 7.3.  Blood transfusion not indicated at this time.  Reiterated the importance of following up with GI as scheduled as he does continue to see some red blood in the stools.  Colonoscopy is not yet scheduled- will see if he can get this done before appointment.  Continue to monitor hemoglobin- I anticipate GI will recheck this at appointment on 1/27.      - Hemoglobin  - CARE COORDINATION REFERRAL    2. Bilateral hip " osteonecrosis (H)    Reviewed MRI results with him and advised him to follow-up with orthopedics, referral placed.  Advised him on light activity.  He is currently using a cane for ambulation.  I think he was hoping for narcotics, but he seemed fairly comfortable in clinic considering, and given his ongoing alcohol abuse, I did not offer a prescription.     - ORTHO  REFERRAL  - CARE COORDINATION REFERRAL    3. Alcohol abuse    Will start acamprosate to see if that helps with abstinence (did not chose naltrexone in case he does end up on opioids in the near future for the hips).  Care coordinator referral placed to see if they can help him connect with outpatient treatment programs and also to help with appointments as above.     - CARE COORDINATION REFERRAL  - acamprosate (CAMPRAL) 333 MG EC tablet; Take 2 tablets (666 mg) by mouth 3 times daily  Dispense: 180 tablet; Refill: 11       Chidi Valenzuela MD  [facility]

## 2020-01-10 ENCOUNTER — OFFICE VISIT (OUTPATIENT)
Dept: FAMILY MEDICINE | Facility: CLINIC | Age: 60
End: 2020-01-10
Payer: COMMERCIAL

## 2020-01-10 ENCOUNTER — TELEPHONE (OUTPATIENT)
Dept: ORTHOPEDICS | Facility: CLINIC | Age: 60
End: 2020-01-10

## 2020-01-10 VITALS
HEART RATE: 74 BPM | RESPIRATION RATE: 14 BRPM | OXYGEN SATURATION: 98 % | WEIGHT: 193 LBS | HEIGHT: 66 IN | DIASTOLIC BLOOD PRESSURE: 70 MMHG | BODY MASS INDEX: 31.02 KG/M2 | SYSTOLIC BLOOD PRESSURE: 130 MMHG | TEMPERATURE: 97.8 F

## 2020-01-10 DIAGNOSIS — F10.10 ALCOHOL ABUSE: ICD-10-CM

## 2020-01-10 DIAGNOSIS — D64.9 ANEMIA, UNSPECIFIED TYPE: Primary | ICD-10-CM

## 2020-01-10 DIAGNOSIS — M87.9 BILATERAL HIP OSTEONECROSIS (H): ICD-10-CM

## 2020-01-10 LAB — HGB BLD-MCNC: 7.3 G/DL (ref 13.3–17.7)

## 2020-01-10 PROCEDURE — 36415 COLL VENOUS BLD VENIPUNCTURE: CPT | Performed by: INTERNAL MEDICINE

## 2020-01-10 PROCEDURE — 99214 OFFICE O/P EST MOD 30 MIN: CPT | Performed by: INTERNAL MEDICINE

## 2020-01-10 PROCEDURE — 85018 HEMOGLOBIN: CPT | Performed by: INTERNAL MEDICINE

## 2020-01-10 RX ORDER — ACAMPROSATE CALCIUM 333 MG/1
666 TABLET, DELAYED RELEASE ORAL 3 TIMES DAILY
Qty: 180 TABLET | Refills: 11 | Status: SHIPPED | OUTPATIENT
Start: 2020-01-10 | End: 2020-02-19 | Stop reason: SINTOL

## 2020-01-10 ASSESSMENT — MIFFLIN-ST. JEOR: SCORE: 1623.19

## 2020-01-10 ASSESSMENT — PAIN SCALES - GENERAL: PAINLEVEL: WORST PAIN (10)

## 2020-01-10 NOTE — TELEPHONE ENCOUNTER
Please call patient with MRI results:    Mr Lumbar Spine W/o Contrast  Result Date: 1/7/2020  MRI LUMBAR SPINE WITHOUT CONTRAST   1/7/2020 2:51 PM HISTORY: Evaluate nerve impingement. Lumbar radiculopathy. TECHNIQUE: Multiplanar multisequence MRI of the lumbar spine without contrast. COMPARISON: Lumbar spine x-ray 12/20/2019. Lumbar spine MR 3/9/2019. FINDINGS: There are 5 lumbar-type vertebral bodies assumed for the purposes of this dictation. The tip of the conus terminates at the level of L2-L3. Unchanged mild anterior wedge compression deformity of L1 vertebral body. No abnormal marrow edema. No new loss of vertebral body height. Lumbar spine alignment is within normal limits. Several Schmorl's node-type deformities in the lumbar spine particularly at T12-L1 and L1-L2. No destructive bony lesions. Level by level as follows: T12-L1: Moderate loss of disc height and disc signal. Small circumferential disc bulge. Normal facets. No significant spinal canal narrowing. No significant neural foraminal narrowing. L1-L2: Moderate loss of disc height and disc signal. Small posterior disc bulge. Normal facets. No significant spinal canal or neural foraminal narrowing. L2-L3: Mild loss of disc height. Small posterior disc bulge. Normal facets. No significant spinal canal or neural foraminal narrowing. L3-L4: Mild loss of disc height. Small posterior disc bulge. Normal facets. No spinal canal narrowing. Mild bilateral neural foraminal narrowing. L4-L5: Mild loss of disc height. Mild posterior disc bulge and left greater than right uncinate spurring. Disc bulge is slightly asymmetric towards the left foraminal region. Moderate bilateral facet hypertrophy. No significant spinal canal narrowing. Mild right neural foraminal narrowing. Moderate left neural foraminal narrowing. L5-S1: Mild loss of disc height. Mild posterior disc bulge slightly asymmetric towards the left subarticular and foraminal regions. Moderate facet  hypertrophy. No spinal canal narrowing. Mild right neural foraminal narrowing. Mild left neural foraminal narrowing. Paraspinous soft tissues: Left kidney appears atrophic with several peripheral T2 evidence cystic lesions incompletely characterized.   IMPRESSION:  1. Multilevel degenerative changes throughout the lumbar spine as detailed above. Overall, no significant change since MR 3/9/2019. 2. Stable anterior compression deformity of the L1 vertebral body. No new loss of vertebral body height. 3. No suspicious bony lesions. BRO GONZALEZ MD    Mr Pelvis Bone Wo Contrast  Result Date: 1/7/2020  MR pelvic bones  without contrast 1/7/2020 3:12 PM Techniques: Multiplanar multisequence imaging of the pelvic bones was obtained without  administration of intra-articular or intravenous contrast using routing protocol. History: eval bilateral hip pain; Lumbar radiculopathy; Bilateral hip pain; Bilateral hip pain Additional History from EMR: Alcohol dependence Comparison: Radiograph 12/20/2019 Findings: Osseous structures Osseous structures: Serpiginous T2 hyperintense irregularity involving the femoral head on the left, and a similar however smaller appearance on the right. Findings are most consistent with osteonecrosis. There appears to be collapse of the femoral head on the left, however the large field-of-view limits evaluation for collapse. Dedicated hip MRI is useful with clinically indicated. Joint and periarticular soft tissue Internal derangement of joints are not well assessed owing to chosen field of view.  Diffuse loss of the articular cartilage on the left with subchondral sclerosis and bone marrow edema. There is tearing of the anterior superior labrum on the left. There is tearing of the anterior superior labrum on the right. Muscles and tendons Muscles: Visualized muscles are unremarkable without evidence of muscle strain, atrophy or mass. Tendons: Mild tendinosis of the proximal hamstrings on the right.  Tendons are otherwise intact. Nerves: Sciatic nerves are unremarkable. Other Findings: None.   Impression: 1.  Bilateral, left greater than right, osteonecrosis of the femoral heads. There appears to be collapse of the left femoral head, however given large field-of-view of this difficult to assess. Dedicated hip MRIs may be useful if clinically indicated. 2.  Severe left and moderate right hip osteoarthrosis. I have personally reviewed the examination and initial interpretation and I agree with the findings. JUDY (Le LAWLER MD    In Summary:  - MRI Pelvis with bilateral osteonecrosis of femoral heads with collapse, also with severe hip arthritis  - MRI Lumbar with degenerative changes, stable L1 vertebral compression fracture    I Recommend:  - Given bilateral femoral head osteonecrosis, I recommend referral to orthopedic surgery  - Patient can follow up in clinic to review results further with me if desired    Tati Santos MD, MD

## 2020-01-10 NOTE — NURSING NOTE
"Chief Complaint   Patient presents with     Results     Discuss MRI results, worseing numbness and lower leg pain.     Breathing Problem     Recheck ongoing breathing concerns, ongoing shortness of breath with mild activity -walking 20 feet. 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 He has been doing some strength training at home as tolerated.        Initial /70   Pulse 74   Temp 97.8  F (36.6  C) (Tympanic)   Resp 14   Ht 1.676 m (5' 6\")   Wt 87.5 kg (193 lb)   SpO2 98%   BMI 31.15 kg/m   Estimated body mass index is 31.15 kg/m  as calculated from the following:    Height as of this encounter: 1.676 m (5' 6\").    Weight as of this encounter: 87.5 kg (193 lb).    Medication Reconciliation: complete    Keli Unger CMA  "

## 2020-01-10 NOTE — PATIENT INSTRUCTIONS
Your shortness of breath is because you are still very anemic.  This is stable, so you don't need a blood tranfusion, but it is very important that you see the GI specialist on 1/27 as scheduled and if possible, have a colonoscopy before that visit.    You will be seeing orthopedics about your hips- they should call you soon to schedule this appointment.     [facility] is a non-[facility] program that can help with the alcohol use- I will have the care coordinator call you to discuss if you can go to that program or other options and help with your appointments.         Impression from MRI of pelvic bones completed 1/7/2020    1.  Bilateral, left greater than right, osteonecrosis of the femoral  heads. There appears to be collapse of the left femoral head, however  given large field-of-view of this difficult to assess. Dedicated hip  MRIs may be useful if clinically indicated.  2.  Severe left and moderate right hip osteoarthrosis.    Wt Readings from Last 3 Encounters:   01/10/20 87.5 kg (193 lb)   12/20/19 84.8 kg (187 lb)   12/05/19 85.2 kg (187 lb 14.4 oz)     BP Readings from Last 6 Encounters:   01/10/20 130/70   12/20/19 109/55   12/05/19 108/54   11/18/19 116/50   11/18/19 (P) 116/50   11/15/19 126/53

## 2020-01-10 NOTE — TELEPHONE ENCOUNTER
Went over results and recommendations. Patient saw his primary care doc this am and she briefly mentioned the results. Went over them more in detail. Patient already has an ortho  referral placed and was transferred to [facility] to schedule. Patient would like to know what he an do while he waits to see the surgeon. He states he is in a lot of pain.    Please advise.    Irena Reyes ATC

## 2020-01-10 NOTE — TELEPHONE ENCOUNTER
Would recommend limited weight bearing.  Is there a more symptomatic side? Cane, crutches, walker.    Tati Santos MD

## 2020-01-13 ENCOUNTER — PATIENT OUTREACH (OUTPATIENT)
Dept: CARE COORDINATION | Facility: CLINIC | Age: 60
End: 2020-01-13

## 2020-01-13 ASSESSMENT — ACTIVITIES OF DAILY LIVING (ADL): DEPENDENT_IADLS:: TRANSPORTATION

## 2020-01-13 NOTE — PROGRESS NOTES
"Clinic Care Coordination Contact    RN CC Initial Progress Note   Background: Patient had PCP office visit on 1/10/2020.  He is enrolled in clinic care coordination with NIKO Galvez as Lead. PCP requesting CC outreach to help review medical treatment plan- patient needs to see Ortho follow up as well as GI appts (has colorectal appt on 1/27 but also needs colonoscopy per PCP)    Assessment: RN CC placed call to patient and explained role and reason for call.  Patient reports he is very upset after learning the results of his recent MRI results with Sports Medicine. (see 1/10/2020 telephone encounter). He states \"providers should have found this a long time ago. Now I'm going to suffer.\"  RN CC provided emotional support but patient then stated \"don't say you're sorry. That doesn't help me.\"   RN CC tried to focus conversation on reviewing the follow up that PCP had discussed at office visit.  Patient states he hasn't obtained an appt yet for Ortho follow up.   Ortho  called and they explained that referral was passed to [facility] as patient needs to see a surgeon.      On hold >15 minutes with [facility].  Confirmed they received the referral from  Ortho  and patient was scheduled for Friday 1/17/2020 at 10:45 AM with Dr. Joe Spence in [place].   Patient looked at his calendar and states he already had it written down. He couldn't remember what that appt was for and then realized he must have gotten a call on Friday to schedule it.      Colonoscopy referral was placed in December and PCP had noted on 1/10/20 that patient should schedule. He declined to during our call and RN CC provided the scheduling number. Strongly encouraged him to schedule as recommended by PCP.    Goals addressed this encounter:   Goals Addressed                 This Visit's Progress      Psychosocial (pt-stated)   On track     Goal Statement: I will attend my medical appointments as recommended  Measure of " "Success: Pt will be aware of why he is scheduled & will attend medical appointments  Supportive Steps to Achieve: [facility] to assist pt with understanding of his medical cares & reasons for appointments  Barriers: Pt stated he has limited transportation & ability to attend appointments  Strengths: Pt wishes to remain in his own home, motivated to remain healthy  Date to Achieve By: March 1, 2020  Patient expressed understanding of goal: Yes    Update 9-4-19:  PCP recommends GI & Hematology appointments, however, pt wishes to \"get over his cold before doing anything else.\"  Update 10-2-19:  SW and pt scheduled above appointments.  Pt will have several follow ups and requested we keep this goal.  12-12-19:  Pt continues to have several medical appointments.  Keeping this goal helps keep him focused, he states.                   Intervention/Education provided during outreach: reviewed [facility] appt on 1/17/20 at 10:45 AM as well as Colon & Rectal appt on 1/27/2020. Reviewed transportation plan with patient for his upcoming appts. He reports good understanding and compliance with securing taxi rides through [facility].      Outreach Frequency: monthly    Plan:   Patient will continue to attend medical appointments as scheduled.  He will remain in contact with [facility] as well as his [facility] , [facility].  SW Care Coordinator will continue to follow. RN Care Coordinator available as needed.    LEONARD TrivediN, RN   [facility]  - Clinic Care Coordinator  Phone: [phone]     "

## 2020-01-13 NOTE — TELEPHONE ENCOUNTER
Attempted to check in with patient regarding scheduling an appointment with a orthopedic surgeon and limiting his weight bearing. Mailbox was full. Unable to LVM  Marcel Edwards ATC

## 2020-01-14 NOTE — TELEPHONE ENCOUNTER
Spoke with patient. He has not currently scheduled an appointment with [facility]. He was provided the scheduling number for [facility]. Federico Gonsalez has sent records to [facility] already. Patient was instructed to make a surgical consult with a [facility] surgeon.  Marcel Edwards ATC

## 2020-01-15 ENCOUNTER — COMMUNICATION - HEALTHEAST (OUTPATIENT)
Dept: UROLOGY | Facility: CLINIC | Age: 60
End: 2020-01-15

## 2020-01-22 ENCOUNTER — PATIENT OUTREACH (OUTPATIENT)
Dept: CARE COORDINATION | Facility: CLINIC | Age: 60
End: 2020-01-22

## 2020-01-22 NOTE — PROGRESS NOTES
"Clinic Care Coordination Contact    Follow Up Progress Note      Assessment: Patient reports concerns with PCA not showing up \"as often as they should\". He reports he is still waiting on some things discussed with the On license of [facility].  He reports he has obtained a box of food, he cannot recall if this is monthly or every two weeks, from [employer].  He is also now receiving [employer] and states \"they are pretty good\".  He plans to talk with  Eleanor, whom he states is coming out to see him today about his other needs, including his 4 wheeled walker, walk in shower and PCA.  Patient reports attempts to attend medical appts.  Please see recent CC RN note regarding this goal. He reports he recently got a rail in and near shower area.  SW attempted to provide her contact information to Patient.  He declined.  He is upset that other SW is not calling him.  SW explained she is calling on her behalf.  He said he will call On license of [facility]  for needs.  We agreed on return call in 1 month to ensure he receives his walker, which he states is very important to him, also PCA services.  He asked for SW to call him next month.    Goals addressed this encounter:   Goals Addressed                 This Visit's Progress       General      Psychosocial (pt-stated)   On track     Goal Statement: I will attend my medical appointments as recommended  Measure of Success: Pt will be aware of why he is scheduled & will attend medical appointments  Supportive Steps to Achieve: [facility] to assist pt with understanding of his medical cares & reasons for appointments  Barriers: Pt stated he has limited transportation & ability to attend appointments  Strengths: Pt wishes to remain in his own home, motivated to remain healthy  Date to Achieve By: March 1, 2020  Patient expressed understanding of goal: Yes    Update 9-4-19:  PCP recommends GI & Hematology appointments, however, pt wishes to \"get over his cold before doing anything " "else.\"  Update 10-2-19:  SW and pt scheduled above appointments.  Pt will have several follow ups and requested we keep this goal.  12-12-19:  Pt continues to have several medical appointments.  Keeping this goal helps keep him focused, he states.                   Intervention/Education provided during outreach: Needs assessment      Outreach Frequency: monthly    Plan:     Care Coordinator will follow up in 1 month     RUDY Breaux, AUTUMN   [facility]  [facility]   [phone]  1/22/2020 11:12 AM    CC RN and discussed Patient, this SW will follow up in one month.      RUDY Breaux, AUTUMN   [facility]   [phone]  1/22/2020 11:30 AM  "

## 2020-01-27 ENCOUNTER — PRE VISIT (OUTPATIENT)
Dept: SURGERY | Facility: CLINIC | Age: 60
End: 2020-01-27

## 2020-01-30 DIAGNOSIS — L71.9 ROSACEA: ICD-10-CM

## 2020-01-30 NOTE — TELEPHONE ENCOUNTER
Requested Prescriptions   Pending Prescriptions Disp Refills     desonide (DESOWEN) 0.05 % external lotion [Pharmacy Med Name: DESONIDE 0.05% LOTN] 118 mL 1     Sig: APPLY TOPICALLY AS NEEDED       There is no refill protocol information for this order        Last Written Prescription Date:  12/5/18  Last Fill Quantity: 118mL,  # refills: 1   Last office visit: 1/10/2020 with prescribing provider:  Nicolas   Future Office Visit:

## 2020-02-03 ENCOUNTER — HOSPITAL ENCOUNTER (OUTPATIENT)
Facility: CLINIC | Age: 60
End: 2020-02-03
Attending: ORTHOPAEDIC SURGERY | Admitting: ORTHOPAEDIC SURGERY
Payer: COMMERCIAL

## 2020-02-03 RX ORDER — DESONIDE 0.5 MG/ML
LOTION TOPICAL PRN
Qty: 118 ML | Refills: 1 | Status: ON HOLD | OUTPATIENT
Start: 2020-02-03 | End: 2020-02-27

## 2020-02-03 NOTE — TELEPHONE ENCOUNTER
Routing refill request to provider for review/approval because:  Drug not on the [facility] refill protocol   Last ordered by Parrish Funk MD Cindy F. RN

## 2020-02-06 ENCOUNTER — PATIENT OUTREACH (OUTPATIENT)
Dept: CARE COORDINATION | Facility: CLINIC | Age: 60
End: 2020-02-06

## 2020-02-06 NOTE — PROGRESS NOTES
Clinic Care Coordination Contact  Care Team Conversations    Leticia Gordon left message for NIKO to inquire on Patient's homebound status.  Patient is requesting outpatient SP visit, is uncertain if he has home care.  SW returned call to Leticia, informed of Patient's current home resources, stated he attends medical appointments, it does not appear as though Patient is homebound.  Leticia reports she has done SP eval outpatient in the past with Patient.  She will discuss appointment with Patient.    Plan: NIKO will call Patient in 1 month for follow up    Catherine De La Cruz, RUDY, MSW   [facility]  Care Coordination  [facility]   [phone]  2/6/2020 4:00 PM

## 2020-02-19 ENCOUNTER — OFFICE VISIT (OUTPATIENT)
Dept: FAMILY MEDICINE | Facility: CLINIC | Age: 60
End: 2020-02-19
Payer: COMMERCIAL

## 2020-02-19 ENCOUNTER — OFFICE VISIT (OUTPATIENT)
Dept: AUDIOLOGY | Facility: CLINIC | Age: 60
End: 2020-02-19
Payer: COMMERCIAL

## 2020-02-19 VITALS
SYSTOLIC BLOOD PRESSURE: 102 MMHG | BODY MASS INDEX: 31.83 KG/M2 | WEIGHT: 197.2 LBS | HEART RATE: 72 BPM | TEMPERATURE: 97 F | OXYGEN SATURATION: 98 % | DIASTOLIC BLOOD PRESSURE: 50 MMHG

## 2020-02-19 DIAGNOSIS — R30.0 DYSURIA: ICD-10-CM

## 2020-02-19 DIAGNOSIS — Z23 NEED FOR VACCINATION: ICD-10-CM

## 2020-02-19 DIAGNOSIS — H90.3 SENSORINEURAL HEARING LOSS, ASYMMETRICAL: ICD-10-CM

## 2020-02-19 DIAGNOSIS — D64.9 ANEMIA, UNSPECIFIED TYPE: Primary | ICD-10-CM

## 2020-02-19 DIAGNOSIS — R82.90 NONSPECIFIC FINDING ON EXAMINATION OF URINE: ICD-10-CM

## 2020-02-19 DIAGNOSIS — N52.9 ERECTILE DYSFUNCTION, UNSPECIFIED ERECTILE DYSFUNCTION TYPE: ICD-10-CM

## 2020-02-19 DIAGNOSIS — N30.01 ACUTE CYSTITIS WITH HEMATURIA: ICD-10-CM

## 2020-02-19 DIAGNOSIS — F17.200 TOBACCO DEPENDENCE SYNDROME: ICD-10-CM

## 2020-02-19 DIAGNOSIS — Z11.59 NEED FOR HEPATITIS C SCREENING TEST: ICD-10-CM

## 2020-02-19 DIAGNOSIS — Z13.220 LIPID SCREENING: ICD-10-CM

## 2020-02-19 DIAGNOSIS — I25.10 ASCVD (ARTERIOSCLEROTIC CARDIOVASCULAR DISEASE): ICD-10-CM

## 2020-02-19 DIAGNOSIS — R06.09 DOE (DYSPNEA ON EXERTION): ICD-10-CM

## 2020-02-19 DIAGNOSIS — Z11.4 SCREENING FOR HIV (HUMAN IMMUNODEFICIENCY VIRUS): ICD-10-CM

## 2020-02-19 DIAGNOSIS — M87.9 BILATERAL HIP OSTEONECROSIS (H): ICD-10-CM

## 2020-02-19 DIAGNOSIS — H90.3 SENSORINEURAL HEARING LOSS, ASYMMETRICAL: Primary | ICD-10-CM

## 2020-02-19 LAB
ABO + RH BLD: ABNORMAL
ABO + RH BLD: ABNORMAL
ALBUMIN UR-MCNC: 30 MG/DL
APPEARANCE UR: ABNORMAL
BACTERIA #/AREA URNS HPF: ABNORMAL /HPF
BILIRUB UR QL STRIP: NEGATIVE
BLD GP AB SCN SERPL QL: ABNORMAL
BLOOD BANK CMNT PATIENT-IMP: ABNORMAL
CHOLEST SERPL-MCNC: 200 MG/DL
COLOR UR AUTO: YELLOW
GLUCOSE UR STRIP-MCNC: NEGATIVE MG/DL
HDLC SERPL-MCNC: 76 MG/DL
HGB BLD-MCNC: 6.1 G/DL (ref 13.3–17.7)
HGB UR QL STRIP: ABNORMAL
KETONES UR STRIP-MCNC: NEGATIVE MG/DL
LDLC SERPL CALC-MCNC: 109 MG/DL
LEUKOCYTE ESTERASE UR QL STRIP: ABNORMAL
NITRATE UR QL: NEGATIVE
NONHDLC SERPL-MCNC: 124 MG/DL
PH UR STRIP: 5.5 PH (ref 5–7)
PSA SERPL-ACNC: 0.3 UG/L (ref 0–4)
RBC #/AREA URNS AUTO: ABNORMAL /HPF
SOURCE: ABNORMAL
SP GR UR STRIP: 1.02 (ref 1–1.03)
SPECIMEN EXP DATE BLD: ABNORMAL
TRIGL SERPL-MCNC: 75 MG/DL
UROBILINOGEN UR STRIP-ACNC: 0.2 EU/DL (ref 0.2–1)
WBC #/AREA URNS AUTO: >100 /HPF

## 2020-02-19 PROCEDURE — 86860 RBC ANTIBODY ELUTION: CPT | Performed by: INTERNAL MEDICINE

## 2020-02-19 PROCEDURE — 90471 IMMUNIZATION ADMIN: CPT | Performed by: INTERNAL MEDICINE

## 2020-02-19 PROCEDURE — 99215 OFFICE O/P EST HI 40 MIN: CPT | Mod: 25 | Performed by: INTERNAL MEDICINE

## 2020-02-19 PROCEDURE — 87088 URINE BACTERIA CULTURE: CPT | Performed by: INTERNAL MEDICINE

## 2020-02-19 PROCEDURE — 86880 COOMBS TEST DIRECT: CPT | Performed by: INTERNAL MEDICINE

## 2020-02-19 PROCEDURE — 86900 BLOOD TYPING SEROLOGIC ABO: CPT | Performed by: INTERNAL MEDICINE

## 2020-02-19 PROCEDURE — 80061 LIPID PANEL: CPT | Performed by: UROLOGY

## 2020-02-19 PROCEDURE — 85018 HEMOGLOBIN: CPT | Performed by: UROLOGY

## 2020-02-19 PROCEDURE — 86870 RBC ANTIBODY IDENTIFICATION: CPT | Performed by: INTERNAL MEDICINE

## 2020-02-19 PROCEDURE — 87186 SC STD MICRODIL/AGAR DIL: CPT | Performed by: INTERNAL MEDICINE

## 2020-02-19 PROCEDURE — 87086 URINE CULTURE/COLONY COUNT: CPT | Performed by: INTERNAL MEDICINE

## 2020-02-19 PROCEDURE — 99207 ZZC NO CHARGE LOS: CPT | Performed by: AUDIOLOGIST

## 2020-02-19 PROCEDURE — 87389 HIV-1 AG W/HIV-1&-2 AB AG IA: CPT | Performed by: UROLOGY

## 2020-02-19 PROCEDURE — 86850 RBC ANTIBODY SCREEN: CPT | Performed by: INTERNAL MEDICINE

## 2020-02-19 PROCEDURE — 86905 BLOOD TYPING RBC ANTIGENS: CPT | Performed by: INTERNAL MEDICINE

## 2020-02-19 PROCEDURE — 86901 BLOOD TYPING SEROLOGIC RH(D): CPT | Performed by: INTERNAL MEDICINE

## 2020-02-19 PROCEDURE — G0103 PSA SCREENING: HCPCS | Performed by: UROLOGY

## 2020-02-19 PROCEDURE — 36415 COLL VENOUS BLD VENIPUNCTURE: CPT | Performed by: UROLOGY

## 2020-02-19 PROCEDURE — 92593 HC HEARING AID CHECK, BINAURAL: CPT | Performed by: AUDIOLOGIST

## 2020-02-19 PROCEDURE — 86803 HEPATITIS C AB TEST: CPT | Performed by: UROLOGY

## 2020-02-19 PROCEDURE — 81001 URINALYSIS AUTO W/SCOPE: CPT | Performed by: INTERNAL MEDICINE

## 2020-02-19 PROCEDURE — 90732 PPSV23 VACC 2 YRS+ SUBQ/IM: CPT | Performed by: INTERNAL MEDICINE

## 2020-02-19 RX ORDER — POLYETHYLENE GLYCOL 3350 17 G
2 POWDER IN PACKET (EA) ORAL
Qty: 108 LOZENGE | Refills: 11 | Status: SHIPPED | OUTPATIENT
Start: 2020-02-19 | End: 2021-01-05

## 2020-02-19 RX ORDER — SULFAMETHOXAZOLE/TRIMETHOPRIM 800-160 MG
1 TABLET ORAL 2 TIMES DAILY
Qty: 14 TABLET | Refills: 0 | Status: SHIPPED | OUTPATIENT
Start: 2020-02-19 | End: 2020-02-27

## 2020-02-19 NOTE — PROGRESS NOTES
Subjective     Abhijeet Mccauley is a 59 year old male who presents to clinic today for the following health issues:  Chief Complaint   Patient presents with     Patient Request     questions about hip surgery     Shortness of Breath     month       I saw Abhijeet last on 1/10.  He has ongoing anemia and Hgb was stable at 7.3.  He was to see GI for ongoing bloody stools on 1/27 but he did not show up for this appointment.  He is still having spotty red blood in the stool.    He was referred to ortho for osteonecrosis of the hips and has surgery scheduled for the left side in March.  He saw ortho in the clinic a couple of weeks ago.  He is taking ibuprofen 400mg twice per day    We started acamprosate for ongoing alcohol abuse.  He says he felt sick on this, so he stopped it.  He is still drinking a couple drinks per day plus a couple of beers.      He is having a difficulty urinating, stings a bit.  Penis seems to be retracting into his scrotum.  Urinating less often.  Feels like he is emptying completely.  Nocturia once per night only.      Having ED.  Viagra didn't work in the past.      He needs a new referral per insurance for hearing aid recheck.        HPI   Shortness of breath       Duration: 1 month     Description (location/character/radiation): Patient reports he can walk half a block and then needs to stop and catch breath.  Cannot climb stairs.    Intensity:  severe    Accompanying signs and symptoms: shortness of breath makes chest hurt     History (similar episodes/previous evaluation): None    Precipitating or alleviating factors: cold exacerbates symptoms     Therapies tried and outcome:     He's reduced his smoking to only 3 per day       Patient Active Problem List   Diagnosis     Essential hypertension     Acute myeloid leukemia in remission (H)     Sensorineural hearing loss, asymmetrical     Alcoholic cirrhosis of liver (H)     Acute alcoholic hepatitis     Coagulation defect (H)     Osteoarthrosis      Thrombocytopenia (H)     Universal ulcerative (chronic) colitis(556.6) (H)     CKD (chronic kidney disease) stage 3, GFR 30-59 ml/min (H)     Rosacea     Mild intermittent asthma without complication     Peptic ulcer disease     Uncomplicated alcohol dependence (H)     Tobacco dependence syndrome     Tubular adenoma of colon     Normocytic anemia     Leukocytosis with increased monocytes     Generalized weakness     COPD exacerbation (H)     AIDP (acute inflammatory demyelinating polyneuropathy) (H)     Primary osteoarthritis of left knee     Past Surgical History:   Procedure Laterality Date     AS TOTAL KNEE ARTHROPLASTY Left      BONE MARROW BIOPSY, BONE SPECIMEN, NEEDLE/TROCAR N/A 6/12/2018    Procedure: BIOPSY BONE MARROW;  Bone Marrow Biopsy;  Surgeon: Demar Sapp MD;  Location: [facility]     ESOPHAGOSCOPY, GASTROSCOPY, DUODENOSCOPY (EGD), COMBINED N/A 2/8/2018    Procedure: COMBINED ESOPHAGOSCOPY, GASTROSCOPY, DUODENOSCOPY (EGD), BIOPSY SINGLE OR MULTIPLE;  gastroscopy with biopsies;  Surgeon: Raz Cobb MD;  Location: [facility]     ESOPHAGOSCOPY, GASTROSCOPY, DUODENOSCOPY (EGD), COMBINED N/A 3/18/2018    Procedure: COMBINED ESOPHAGOSCOPY, GASTROSCOPY, DUODENOSCOPY (EGD);;  Surgeon: Raz Cobb MD;  Location: [facility]     LACERATION REPAIR Right     Right leg     PHACOEMULSIFICATION WITH STANDARD INTRAOCULAR LENS IMPLANT Left 7/1/2019    Procedure: cataract removal with implant.;  Surgeon: Adrian Oliveira MD;  Location: [facility]     PHACOEMULSIFICATION WITH STANDARD INTRAOCULAR LENS IMPLANT Right 7/29/2019    Procedure: Cataract removal with implant.;  Surgeon: Adrian Oliveira MD;  Location: [facility]       Social History     Tobacco Use     Smoking status: Current Every Day Smoker     Packs/day: 0.50     Years: 30.00     Pack years: 15.00     Types: Cigarettes     Smokeless tobacco: Never Used     Tobacco comment: 5 cigarettes a day    Substance Use Topics     Alcohol use: Yes      Comment: Down to 3 beers per day.  Sometimes a cocktail also.     Family History   Problem Relation Age of Onset     Hypertension Mother      Coronary Artery Disease Father         MI         Current Outpatient Medications   Medication Sig Dispense Refill     acetaminophen-codeine 300-30 MG PO tablet Take 1-2 tablets by mouth daily as needed for severe pain 20 tablet 0     baclofen (LIORESAL) 10 MG tablet TAKE 1/2 TO 1 TABLET BY MOUTH UP TO THREE TIMES A DAY 90 tablet 3     buPROPion (WELLBUTRIN SR) 150 MG 12 hr tablet Take once per day for 3 days and then increase to twice per day 60 tablet 12     furosemide (LASIX) 20 MG tablet Take 1 tablet (20 mg) by mouth every morning 90 tablet 3     gabapentin (NEURONTIN) 300 MG capsule Take 2 capsules (600 mg) by mouth 3 times daily 180 capsule 11     loratadine (CLARITIN) 10 MG tablet Take 1 tablet (10 mg) by mouth daily as needed for allergies 30 tablet 11     mometasone-formoterol (DULERA) 200-5 MCG/ACT oral inhaler Inhale 2 puffs into the lungs 2 times daily        montelukast (SINGULAIR) 10 MG tablet Take 1 tablet (10 mg) by mouth At Bedtime 90 tablet 3     nicotine 2 MG MT lozenge Place 1 lozenge (2 mg) inside cheek every hour as needed for smoking cessation 108 lozenge 11     pantoprazole (PROTONIX) 40 MG EC tablet Take 1 tablet (40 mg) by mouth daily 90 tablet 3     POTASSIUM CHLORIDE PO        propranolol (INDERAL) 20 MG tablet Take 1 tablet (20 mg) by mouth 2 times daily 180 tablet 3     spironolactone (ALDACTONE) 25 MG tablet Take 1 tablet (25 mg) by mouth daily 90 tablet 3     sulfamethoxazole-trimethoprim 800-160 MG PO tablet Take 1 tablet by mouth 2 times daily for 7 days 14 tablet 0     traZODone (DESYREL) 50 MG tablet Take 1 tablet (50 mg) by mouth At Bedtime 90 tablet 3     albuterol (VENTOLIN HFA) 108 (90 Base) MCG/ACT inhaler Inhale 2 puffs into the lungs every 4 hours as needed for shortness of breath / dyspnea or wheezing 18 g 11     benzocaine (ORABASE)  20 % PSTE paste Take by mouth 4 times daily as needed for other (mouth pain) (Patient not taking: Reported on 1/10/2020) 1 Tube 0     cholestyramine (QUESTRAN) 4 g packet Take 1 packet (4 g) by mouth 2 times daily (with meals) (Patient not taking: Reported on 11/11/2019) 60 each 11     desonide (DESOWEN) 0.05 % external lotion APPLY TOPICALLY AS NEEDED 118 mL 1     order for DME Equipment being ordered: 4 wheel walker 1 Units 0     order for DME Equipment being ordered: 2 pairs thigh high compression stockings, strength per patient preference (Patient not taking: Reported on 1/10/2020) 2 Units 1     order for DME Equipment being ordered: Compression Stockings TWO (2) Pairs, 15-20 mm Hg. (Patient not taking: Reported on 9/10/2019) 2 Package prn     order for DME Equipment being ordered: Manual wheelchair.  Estimated duration- 3 months 1 Units 0     order for DME Equipment being ordered: bed pull up bar 1 Units 0     order for DME Equipment being ordered: Wheelchair (Patient not taking: Reported on 9/10/2019) 1 each 0     order for DME Equipment being ordered: shower chair 1 Device 0     rifaximin (XIFAXAN) 550 MG TABS tablet Take 1 tablet (550 mg) by mouth 2 times daily 180 tablet 3     SODIUM BICARBONATE PO Take 650 mg by mouth daily as needed        tiotropium (SPIRIVA) 18 MCG capsule Inhale 1 capsule into the lungs daily Using PRN       vitamin B complex with vitamin C (STRESS TAB) tablet Take 1 tablet by mouth daily (Patient not taking: Reported on 1/10/2020) 90 tablet 3     vitamin B1 (THIAMINE) 100 MG tablet Take 1 tablet (100 mg) by mouth daily (Patient not taking: Reported on 9/30/2019) 90 tablet 3     Allergies   Allergen Reactions     Blood Transfusion Related (Informational Only)      Patient has a history of a clinically significant antibody against RBC antigens.  A delay in compatible RBCs may occur.     Famotidine Unknown and Other (See Comments)     Severe abdominal cramps     Cyclobenzaprine Hives      "Methocarbamol Other (See Comments)     Made pt's \"face break out\"     Pepcid Cramps     Severe abdominal cramps     Vancomycin Other (See Comments)     hallucinations     Vfend Other (See Comments)     IV - cold sweats, Iron tablet makes him nauseated and stomach ached     Hydrocodone-Acetaminophen Rash       Reviewed and updated as needed this visit by Provider         Review of Systems   ROS COMP: Constitutional, cardiovascular, pulmonary, gi and gu systems are negative, except as otherwise noted.      Objective    /50 (BP Location: Left arm, Patient Position: Sitting, Cuff Size: Adult Regular)   Pulse 72   Temp 97  F (36.1  C)   Wt 89.4 kg (197 lb 3.2 oz)   SpO2 98%   BMI 31.83 kg/m    Body mass index is 31.83 kg/m .  Physical Exam   GENERAL: pale, alert and no distress  RESP: lungs clear to auscultation - no rales, rhonchi or wheezes  CV: regular rate and rhythm, normal S1 S2, no S3 or S4, no murmur, click or rub, mild lower leg edema    Diagnostic Test Results:  Labs reviewed in Epic  Results for orders placed or performed in visit on 02/19/20 (from the past 24 hour(s))   Hemoglobin   Result Value Ref Range    Hemoglobin 6.1 (LL) 13.3 - 17.7 g/dL   Lipid panel reflex to direct LDL Non-fasting   Result Value Ref Range    Cholesterol 200 (H) <200 mg/dL    Triglycerides 75 <150 mg/dL    HDL Cholesterol 76 >39 mg/dL    LDL Cholesterol Calculated 109 (H) <100 mg/dL    Non HDL Cholesterol 124 <130 mg/dL   Prostate spec antigen screen   Result Value Ref Range    PSA 0.30 0 - 4 ug/L   *UA reflex to Microscopic and Culture ([place] [facility] (except [place] and [place])   Result Value Ref Range    Color Urine Yellow     Appearance Urine Cloudy     Glucose Urine Negative NEG^Negative mg/dL    Bilirubin Urine Negative NEG^Negative    Ketones Urine Negative NEG^Negative mg/dL    Specific Gravity Urine 1.020 1.003 - 1.035    Blood Urine Large (A) NEG^Negative    pH Urine 5.5 5.0 - 7.0 pH    Protein " Albumin Urine 30 (A) NEG^Negative mg/dL    Urobilinogen Urine 0.2 0.2 - 1.0 EU/dL    Nitrite Urine Negative NEG^Negative    Leukocyte Esterase Urine Large (A) NEG^Negative    Source Urine    Urine Microscopic   Result Value Ref Range    WBC Urine >100 (A) OTO5^0 - 5 /HPF    RBC Urine 10-25 (A) OTO2^O - 2 /HPF    Bacteria Urine Many (A) NEG^Negative /HPF           Assessment & Plan     1. Anemia, unspecified type    Hgb has again decreased and is down to 6.1 likely due to ongoing GI bleed.  Transfusion recommended, risks/benefits reviewed and consent signed.  Unfortunately, our infusion center has no openings until next Tuesday.  Discussed that he could go to another infusion center or the ER for transfusion, but Abhijeet does not wish to do that, so appointment scheduled for Tuesday.  Advised him again to follow-up with GI, ideally before his hip surgery as ideally he'd have this bleeding controlled before surgery.      - Hemoglobin  - ABO/Rh type and screen      2. DOSHI (dyspnea on exertion)    Anemia certainly contributing, but may also have symptoms from COPD.  He still hasn't scheduled PFTs- advised him he needs to do this before his surgery.       3. Bilateral hip osteonecrosis (H)    He again requested pain medication.  Appears he had some Tylenol #3 last fall, this thinks this may have helped.  Small prescription provided- advised him to use sparingly.  Avoid NSAIDs due to GI bleed.  Can use Tylenol despite cirrhosis, but no more than 3000mg daily.      - acetaminophen-codeine 300-30 MG PO tablet; Take 1-2 tablets by mouth daily as needed for severe pain  Dispense: 20 tablet; Refill: 0    4. Dysuria    - *UA reflex to Microscopic and Culture ([place] [facility] (except [place] and [place])    5. Acute cystitis with hematuria    UA supportive of UTI.  Will start antibiotic and f/u on culture.     - sulfamethoxazole-trimethoprim 800-160 MG PO tablet; Take 1 tablet by mouth 2 times daily for 7 days   Dispense: 14 tablet; Refill: 0    6. Erectile dysfunction, unspecified erectile dysfunction type    He requested med for ED, but given his anemia I actually recommended abstaining from intercourse for now as this may be too strenuous on the heart with such a low Hgb.      7. Need for hepatitis C screening test    - **Hepatitis C Screen Reflex to RNA FUTURE anytime    8. Screening for HIV (human immunodeficiency virus)    - HIV Antigen Antibody Combo    9. Lipid screening    Intermediate risk score, will offer statin.     The 10-year ASCVD risk score (Yousuf GU Jr., et al., 2013) is: 8.6%    Values used to calculate the score:      Age: 59 years      Sex: Male      Is Non- : No      Diabetic: No      Tobacco smoker: Yes      Systolic Blood Pressure: 102 mmHg      Is BP treated: Yes      HDL Cholesterol: 76 mg/dL      Total Cholesterol: 200 mg/dL     - Lipid panel reflex to direct LDL Non-fasting    10. Tobacco dependence syndrome    - nicotine 2 MG MT lozenge; Place 1 lozenge (2 mg) inside cheek every hour as needed for smoking cessation  Dispense: 108 lozenge; Refill: 11    11. Need for vaccination    - Pneumococcal vaccine 23 valent PPSV23  (Pneumovax) [17453]  - ADMIN: Vaccine, Initial (81727)    12. Sensorineural hearing loss, asymmetrical    - AUDIOLOGY ADULT REFERRAL; Future  - OTOLARYNGOLOGY REFERRAL    13. Nonspecific finding on examination of urine    - Urine Culture Aerobic Bacterial     Tobacco Cessation:   reports that he has been smoking cigarettes. He has a 15.00 pack-year smoking history. He has never used smokeless tobacco.  Tobacco Cessation Action Plan: Pharmacotherapies : other Nicotine replacement    Follow-up on 3/10 as scheduled.     Chidi Valenzuela MD  [facility]    More than 50% of this 40 minute face-to-face appointment was spent on counseling and coordination of care of the above.

## 2020-02-19 NOTE — LETTER
February 20, 2020      Abhijeet Mccauley  [address] [place] [id number]        Dear ,    We are writing to inform you of your test results.    Your HIV and Hep C tests are normal.     Resulted Orders   Hemoglobin   Result Value Ref Range    Hemoglobin 6.1 (LL) 13.3 - 17.7 g/dL      Comment:      CV TO DR LAKSHMI MERRITT'S NURSE, AG 1538   **Hepatitis C Screen Reflex to RNA FUTURE anytime   Result Value Ref Range    Hepatitis C Antibody Nonreactive NR^Nonreactive      Comment:      Assay performance characteristics have not been established for newborns,   infants, and children     HIV Antigen Antibody Combo   Result Value Ref Range    HIV Antigen Antibody Combo Nonreactive NR^Nonreactive          Comment:      HIV-1 p24 Ag & HIV-1/HIV-2 Ab Not Detected   Lipid panel reflex to direct LDL Non-fasting   Result Value Ref Range    Cholesterol 200 (H) <200 mg/dL      Comment:      Desirable:       <200 mg/dl    Triglycerides 75 <150 mg/dL      Comment:      Non Fasting    HDL Cholesterol 76 >39 mg/dL    LDL Cholesterol Calculated 109 (H) <100 mg/dL      Comment:      Above desirable:  100-129 mg/dl  Borderline High:  130-159 mg/dL  High:             160-189 mg/dL  Very high:       >189 mg/dl      Non HDL Cholesterol 124 <130 mg/dL   *UA reflex to Microscopic and Culture ([place] [facility] (except [place] and [place])   Result Value Ref Range    Color Urine Yellow     Appearance Urine Cloudy     Glucose Urine Negative NEG^Negative mg/dL    Bilirubin Urine Negative NEG^Negative    Ketones Urine Negative NEG^Negative mg/dL    Specific Gravity Urine 1.020 1.003 - 1.035    Blood Urine Large (A) NEG^Negative    pH Urine 5.5 5.0 - 7.0 pH    Protein Albumin Urine 30 (A) NEG^Negative mg/dL    Urobilinogen Urine 0.2 0.2 - 1.0 EU/dL    Nitrite Urine Negative NEG^Negative    Leukocyte Esterase Urine Large (A) NEG^Negative    Source Urine    ABO/Rh type and screen   Result Value Ref Range    ABO O      RH(D) Pos     Antibody Screen Pos (A)     Test Valid Only At [facility]        Specimen Expires 02/22/2020    Urine Microscopic   Result Value Ref Range    WBC Urine >100 (A) OTO5^0 - 5 /HPF    RBC Urine 10-25 (A) OTO2^O - 2 /HPF    Bacteria Urine Many (A) NEG^Negative /HPF   Urine Culture Aerobic Bacterial   Result Value Ref Range    Specimen Description Midstream Urine     Special Requests Specimen received in preservative     Culture Micro Culture in progress        If you have any questions or concerns, please call the clinic at the number listed above.       Sincerely,        Chidi Valenzuela MD

## 2020-02-19 NOTE — Clinical Note
Shanna Jha still hasn't seen GI and continues to have blood in the stool and anemia requiring blood transfusion.  I'd really like him to see GI before his upcoming hip surgery as well as have PFTs done.  I told him this today, but could you help him make sure this happens?Thanks,Elodia

## 2020-02-19 NOTE — PATIENT INSTRUCTIONS
I have ordered a lung function test (PFT).  Please call [phone] to schedule this.      Your anemia is also contributing to your shortness of breath.  Please reschedule your GI appointment, ideally before surgery.     I'd recommend checking with your insurance to see if they cover the new shingles vaccine, Shingrix.  This vaccine is much more effective than the older shingles vaccine.  Check for availability at your pharmacy if we don't have any in stock at the clinic.       Use the Tylenol #3 very sparingly for severe pain.  You can take regular Tylenol as well, just no more than 3000mg day total.

## 2020-02-19 NOTE — PROGRESS NOTES
[facility]        SUBJECTIVE:  Abhijeet Mccauley,59 year old male comes in for a hearing aid check of his 2019 Phonak Bolero B50-R hearing aids and custom ear molds. He reports his earmolds are plugged and he is not hearing well.     OBJECTIVE:  Replaced plugged and hardened earmold tubing and ear hooks bilaterally. Verified hearing aid functionality.      See chart in the hearing aid room.     ASSESSMENT/PLAN:    Return to clinic every 3-4 months for earmold tubing changes.     Ai Min M.A. -AAA, #[id number]

## 2020-02-19 NOTE — NURSING NOTE
Prior to immunization administration, verified patients identity using patient s name and date of birth. Please see Immunization Activity for additional information.     Screening Questionnaire for Adult Immunization    Are you sick today?   No   Do you have allergies to medications, food, a vaccine component or latex?   Yes   Have you ever had a serious reaction after receiving a vaccination?   No   Do you have a long-term health problem with heart, lung, kidney, or metabolic disease (e.g., diabetes), asthma, a blood disorder, no spleen, complement component deficiency, a cochlear implant, or a spinal fluid leak?  Are you on long-term aspirin therapy?   Yes   Do you have cancer, leukemia, HIV/AIDS, or any other immune system problem?   No   Do you have a parent, brother, or sister with an immune system problem?   No   In the past 3 months, have you taken medications that affect  your immune system, such as prednisone, other steroids, or anticancer drugs; drugs for the treatment of rheumatoid arthritis, Crohn s disease, or psoriasis; or have you had radiation treatments?   No   Have you had a seizure, or a brain or other nervous system problem?   No   During the past year, have you received a transfusion of blood or blood    products, or been given immune (gamma) globulin or antiviral drug?   Yes   For women: Are you pregnant or is there a chance you could become       pregnant during the next month?   No   Have you received any vaccinations in the past 4 weeks?   No     Immunization questionnaire was positive for at least one answer.  Dr. Valenzuela aware .        Per orders of Dr. Valenzuela, injection of Pneumovax 23 given by Renita Sauer CMA. Patient instructed to remain in clinic for 15 minutes afterwards, and to report any adverse reaction to me immediately.       Screening performed by Renita Sauer CMA on 2/19/2020 at 3:10 PM.

## 2020-02-19 NOTE — PROGRESS NOTES
SUBJECTIVE:   CC: Abhijeet Mccauley is an 59 year old male who presents for preventative health visit.     HPI  {Add if <65 person on Medicare  - Required Questions (Optional):002605}  {Outside tests to abstract? :040974}    {additional problems to add (Optional):697255}    I saw Abhijeet last on 1/10.  He has ongoing anemia and Hgb was stable at 7.3.  He was to see GI for ongoing bloody stools on 1/27 but he did not show up for this appointment. ***    He was referred to ortho for osteonecrosis of the hips and has surgery scheduled for the left side in March.    We started acamprosate for ongoing alcohol abuse.      Today's PHQ-2 Score:   PHQ-2 ( 1999 [employer]) 5/2/2019   Q1: Little interest or pleasure in doing things 0   Q2: Feeling down, depressed or hopeless 1   PHQ-2 Score 1       Abuse: Current or Past(Physical, Sexual or Emotional)- { :722719}  Do you feel safe in your environment? { :419454}    Have you ever done Advance Care Planning? (For example, a Health Directive, POLST, or a discussion with a medical provider or your loved ones about your wishes): { :327451}    Social History     Tobacco Use     Smoking status: Current Every Day Smoker     Packs/day: 0.50     Years: 30.00     Pack years: 15.00     Types: Cigarettes     Smokeless tobacco: Never Used     Tobacco comment: 5 cigarettes a day    Substance Use Topics     Alcohol use: Yes     Comment: Down to 3 beers per day.  Sometimes a cocktail also.     {Rooming Staff- Complete this question if Prescreen response is not shown below for today's visit. If you drink alcohol do you typically have >3 drinks per day or >7 drinks per week? (Optional):155280}    No flowsheet data found.{add AUDIT responses (Optional) (A score of 7 for adult men is an indication of hazardous drinking; a score of 8 or more is an indication of an alcohol use disorder.  A score of 7 or more for adult women is an indication of hazardous drinking or an alchohol use  "disorder):875025}    Last PSA: No results found for: PSA    Reviewed orders with patient. Reviewed health maintenance and updated orders accordingly - { :785131::\"Yes\"}  {Chronicprobdata (optional):710982}    Reviewed and updated as needed this visit by clinical staff         Reviewed and updated as needed this visit by Provider        {HISTORY OPTIONS (Optional):805561}    Review of Systems  {MALE ROS (Optional):937656::\"CONSTITUTIONAL: NEGATIVE for fever, chills, change in weight\",\"INTEGUMENTARY/SKIN: NEGATIVE for worrisome rashes, moles or lesions\",\"EYES: NEGATIVE for vision changes or irritation\",\"ENT: NEGATIVE for ear, mouth and throat problems\",\"RESP: NEGATIVE for significant cough or SOB\",\"CV: NEGATIVE for chest pain, palpitations or peripheral edema\",\"GI: NEGATIVE for nausea, abdominal pain, heartburn, or change in bowel habits\",\" male: negative for dysuria, hematuria, decreased urinary stream, erectile dysfunction, urethral discharge\",\"MUSCULOSKELETAL: NEGATIVE for significant arthralgias or myalgia\",\"NEURO: NEGATIVE for weakness, dizziness or paresthesias\",\"PSYCHIATRIC: NEGATIVE for changes in mood or affect\"}    OBJECTIVE:   There were no vitals taken for this visit.    Physical Exam  {Exam Choices (Optional):820624}    {Diagnostic Test Results (Optional):369330::\"Diagnostic Test Results:\",\"Labs reviewed in Epic\"}    ASSESSMENT/PLAN:   {Diag Picklist:391720}    COUNSELING:   {MALE COUNSELING MESSAGES:021826::\"Reviewed preventive health counseling, as reflected in patient instructions\"}    Estimated body mass index is 31.15 kg/m  as calculated from the following:    Height as of 1/10/20: 1.676 m (5' 6\").    Weight as of 1/10/20: 87.5 kg (193 lb).     {Weight Management Plan (ACO) Complete if BMI is abnormal-  Ages 18-64  BMI >24.9.  Age 65+ with BMI <23 or >30 (Optional):403982}     reports that he has been smoking cigarettes. He has a 15.00 pack-year smoking history. He has never used smokeless " tobacco.  {Tobacco Cessation -- Complete if patient is a smoker (Optional):872140}    Counseling Resources:  ATP IV Guidelines  Pooled Cohorts Equation Calculator  FRAX Risk Assessment  ICSI Preventive Guidelines  Dietary Guidelines for Americans, 2010  USDA's MyPlate  ASA Prophylaxis  Lung CA Screening    Chidi Valenzuela MD  [facility]

## 2020-02-20 ENCOUNTER — TELEPHONE (OUTPATIENT)
Dept: FAMILY MEDICINE | Facility: CLINIC | Age: 60
End: 2020-02-20

## 2020-02-20 LAB
HCV AB SERPL QL IA: NONREACTIVE
HIV 1+2 AB+HIV1 P24 AG SERPL QL IA: NONREACTIVE

## 2020-02-20 RX ORDER — ATORVASTATIN CALCIUM 20 MG/1
20 TABLET, FILM COATED ORAL DAILY
Qty: 90 TABLET | Refills: 3 | Status: SHIPPED | OUTPATIENT
Start: 2020-02-20 | End: 2021-03-12

## 2020-02-20 NOTE — TELEPHONE ENCOUNTER
"Dr Valenzuela,   Lab called, they see positive antibodies on his type and screen,   And wanted Dr Valenzuela to know that it will take longer to get the positive tested further.   I was able to read Dr Valenzuela's notes and advised he is scheduled to get blood on Tuesday the 25th.     \"oh, ok, it should be ok and done by Tuesday then, thanks, \"    Dr Valenzuela, just GALILEO.     Tiffani Pemberton RNC      "

## 2020-02-21 ENCOUNTER — TELEPHONE (OUTPATIENT)
Dept: FAMILY MEDICINE | Facility: CLINIC | Age: 60
End: 2020-02-21

## 2020-02-21 DIAGNOSIS — M79.642 PAIN IN BOTH HANDS: Primary | ICD-10-CM

## 2020-02-21 DIAGNOSIS — M79.641 PAIN IN BOTH HANDS: Primary | ICD-10-CM

## 2020-02-21 RX ORDER — ACETAMINOPHEN 500 MG
500-1000 TABLET ORAL EVERY 6 HOURS PRN
Qty: 100 TABLET | Refills: 3 | Status: ON HOLD | OUTPATIENT
Start: 2020-02-21 | End: 2020-06-25

## 2020-02-21 NOTE — TELEPHONE ENCOUNTER
As discussed at his appointment and as already noted, I am concerned that sexual activity may put too much demand on his heart while he is so anemic, and I would recommend avoiding any vigorous activity until his anemia is improved.    Chidi Valenzuela MD

## 2020-02-21 NOTE — TELEPHONE ENCOUNTER
"Patient requesting alternative to Viagra - he has not current or historical RX on file for this, \"I borrowed a couple pills from my valarie and they didn't work.\"    He does not know the dose either.    RN reviewed OV notes 2-19-20 with patient:  6. Erectile dysfunction, unspecified erectile dysfunction type:   He requested med for ED, but given his anemia I actually recommended abstaining from intercourse for now as this may be too strenuous on the heart with such a low Hgb.      Patient responds, \"why do I have to suffer? And how long do I have to suffer?\"    Routing to provider.  Fatou ESTRADA RN      "

## 2020-02-21 NOTE — TELEPHONE ENCOUNTER
Reason for Call:  GI referral, cannot get in until after his surgery date    Detailed comments: patients caregiver is calling stating they call for the GI referral and were told he cannot get in with the [facility] until after his surgery which is on the 3/24. Patient is wondering if it is imperative he have that GI consult done prior to Surgery, and if so, can he go elsewhere? Please belen patient.    Phone Number Patient can be reached at: Cell number on file:    Telephone Information:   Mobile [phone]       Best Time: any    Can we leave a detailed message on this number? YES   Lia Leal  Clinic Station        Call taken on 2/21/2020 at 10:08 AM by Lia Romo

## 2020-02-21 NOTE — TELEPHONE ENCOUNTER
GALILEO:  RN was able to get patient appt that worked in patient's schedule to be seen on 2-26-20 @ 1:00 pm at [facility] location.    OV notes 2-19-20:  1. Anemia, unspecified type  Hgb has again decreased and is down to 6.1 likely due to ongoing GI bleed.  Transfusion recommended, risks/benefits reviewed and consent signed.  Unfortunately, our infusion center has no openings until next Tuesday.  Discussed that he could go to another infusion center or the ER for transfusion, but Abhijeet does not wish to do that, so appointment scheduled for Tuesday.  Advised him again to follow-up with GI, ideally before his hip surgery as ideally he'd have this bleeding controlled before surgery.

## 2020-02-21 NOTE — TELEPHONE ENCOUNTER
Prescription for Tylenol sent.  Should not exceed more than 3000mg of acetaminophen per day taking into account both the Tylenol #3 and the regular Tylenol.    Thanks,  Chidi Valenzuela MD

## 2020-02-21 NOTE — TELEPHONE ENCOUNTER
"Patient notified and voices his frustration.    Side note, he is requesting medication to help with the arthritis in his hands.  He does not want to \"buy\" anything OTC, \"cant she just send me some medication to the pharmacy?\"    Pharm ready.    Fatou ESTRADA RN    "

## 2020-02-21 NOTE — TELEPHONE ENCOUNTER
Reason for call:  Patient reporting a symptom    Symptom or request: Patient is calling asking for medication for sexual intercourse he said that Viagra does not work.    Duration (how long have symptoms been present): quiet a while he said    Have you been treated for this before? Yes      Phone Number patient can be reached at:  Cell number on file:    Telephone Information:   Mobile [phone]       Best Time:  any    Can we leave a detailed message on this number:  YES    Call taken on 2/21/2020 at 8:42 AM by Karen Menjivar

## 2020-02-24 ENCOUNTER — TELEPHONE (OUTPATIENT)
Dept: FAMILY MEDICINE | Facility: CLINIC | Age: 60
End: 2020-02-24

## 2020-02-24 PROBLEM — I25.10 ASCVD (ARTERIOSCLEROTIC CARDIOVASCULAR DISEASE): Status: ACTIVE | Noted: 2020-02-24

## 2020-02-24 LAB
BACTERIA SPEC CULT: ABNORMAL
BACTERIA SPEC CULT: ABNORMAL
Lab: ABNORMAL
SPECIMEN SOURCE: ABNORMAL

## 2020-02-25 ENCOUNTER — TELEPHONE (OUTPATIENT)
Dept: FAMILY MEDICINE | Facility: CLINIC | Age: 60
End: 2020-02-25

## 2020-02-25 NOTE — TELEPHONE ENCOUNTER
We can try to advise he go to the ER, but I'm not sure he will agree.  It was offered as an option last week, and he declined preferring to wait until this week.  If he declines again, should schedule ASAP wherever he can get in.    Thanks,  Chidi Valenzuela MD

## 2020-02-25 NOTE — TELEPHONE ENCOUNTER
Six other sites. Unless out of [facility] network.     [facility] [phone]: was advised that they are fully booked this week  [facility] [phone]: attempted to call but was on hold for 15 minutes.   [facility]    Call [facility] to accommodate this week or advise patient to go to ER?     LEONARD ThaoN, RN

## 2020-02-25 NOTE — TELEPHONE ENCOUNTER
[facility] summary form completed and routed to Dr. Valenzuela for review and signature.

## 2020-02-25 NOTE — TELEPHONE ENCOUNTER
"Dr. Valenzuela,    Patient missed his [facility] infusion today due to transportation issues.  I did speak to the patient and he states he needs two day notice to get another ride set up. Clarified with the patient why he cancelled ride and infusion today. \"because my ride was 15 mins late and I would not get to my appt on time\".  States the infusion clinic can not get him in until next week and he needs the infusion.  I called infusion and they have 3 RN's out this week, and next week may be tight too.  The person I spoke to in the infusion said that Dr. Valenzuela could call and speak to someone if patient needs this at [phone] or the patient could go to the ER.  Please advise. Catherine WANG RN    "

## 2020-02-25 NOTE — TELEPHONE ENCOUNTER
"Patient notified. Adamantly declines going to ER and to any other infusion location. He will call [facility] for appointment. Again recommended to go to ER and patient states \"Why so I can just sit there? I ain't going to no ER, the ER is a joke\". Provider notified.      LEONARD ThaoN, RN    "

## 2020-02-25 NOTE — TELEPHONE ENCOUNTER
Are there any other infusion locations he can get into this week?  Ideally he would have had the transfusion last week, and I'd rather he not wait until next week.    Thanks,  Chidi Valenzuela MD

## 2020-02-25 NOTE — TELEPHONE ENCOUNTER
Reason for Call:  Other appointment    Detailed comments: Patient had to cancel his lab infusion appointment this morning due to the medical ride had to cancel. He was not able to get another ride today. Patient spoke with Oncology and they do not have any openings this week for the infusion. Told him to contact his PCP to ask if ok to skip this weeks. Also patient is not feeling well either.     Phone Number Patient can be reached at: Cell number on file:    Telephone Information:   Mobile [phone]       Best Time: any    Can we leave a detailed message on this number? YES    Call taken on 2/25/2020 at 9:56 AM by Karen Menjivar

## 2020-02-26 ENCOUNTER — OFFICE VISIT (OUTPATIENT)
Dept: GASTROENTEROLOGY | Facility: CLINIC | Age: 60
End: 2020-02-26
Payer: COMMERCIAL

## 2020-02-26 VITALS
DIASTOLIC BLOOD PRESSURE: 54 MMHG | HEIGHT: 67 IN | HEART RATE: 51 BPM | WEIGHT: 188.5 LBS | SYSTOLIC BLOOD PRESSURE: 115 MMHG | BODY MASS INDEX: 29.58 KG/M2 | OXYGEN SATURATION: 98 %

## 2020-02-26 DIAGNOSIS — D62 ANEMIA DUE TO BLOOD LOSS, ACUTE: ICD-10-CM

## 2020-02-26 DIAGNOSIS — Z87.11 HISTORY OF GASTRIC ULCER: ICD-10-CM

## 2020-02-26 DIAGNOSIS — K62.5 RECTAL BLEEDING: ICD-10-CM

## 2020-02-26 DIAGNOSIS — K21.9 GASTROESOPHAGEAL REFLUX DISEASE WITHOUT ESOPHAGITIS: ICD-10-CM

## 2020-02-26 DIAGNOSIS — Z86.0100 HISTORY OF COLONIC POLYPS: ICD-10-CM

## 2020-02-26 DIAGNOSIS — D64.9 ANEMIA, UNSPECIFIED TYPE: Primary | ICD-10-CM

## 2020-02-26 LAB
ABO + RH BLD: NORMAL
ABO + RH BLD: NORMAL
BLD GP AB INVEST PLASRBC-IMP: NORMAL
BLD GP AB SCN SERPL QL ELUTION: NORMAL
BLOOD BANK CMNT PATIENT-IMP: NORMAL
BLOOD BANK CMNT PATIENT-IMP: NORMAL
DAT C3-SP REAG RBC QL: NORMAL
DAT IGG-SP REAG RBC-IMP: NORMAL
DAT POLY-SP REAG RBC QL: NORMAL

## 2020-02-26 PROCEDURE — 99204 OFFICE O/P NEW MOD 45 MIN: CPT | Performed by: PHYSICIAN ASSISTANT

## 2020-02-26 RX ORDER — PANTOPRAZOLE SODIUM 40 MG/1
40 TABLET, DELAYED RELEASE ORAL 2 TIMES DAILY
Qty: 120 TABLET | Refills: 0 | Status: SHIPPED | OUTPATIENT
Start: 2020-02-26 | End: 2020-04-28

## 2020-02-26 ASSESSMENT — MIFFLIN-ST. JEOR: SCORE: 1618.66

## 2020-02-26 ASSESSMENT — PAIN SCALES - GENERAL: PAINLEVEL: NO PAIN (0)

## 2020-02-26 NOTE — PATIENT INSTRUCTIONS
Thank you for visiting our Gastroenterology office today.     Avoid NSAID's such as ibuprofen, aleve, naproxen.     Increase Protonix (pantoprazole) 40 mg to twice daily on an empty stomach about 30 minutes food.     Please call [phone] to schedule a colonoscopy and upper endoscopy at the [facility].

## 2020-02-26 NOTE — PROGRESS NOTES
GASTROENTEROLOGY NEW PATIENT CLINIC VISIT    CC/REFERRING MD:    Chidi Valenzuela    REASON FOR CONSULTATION:  RECHECK (rectal bleeding and anemia)       HISTORY OF PRESENT ILLNESS:    Abhijeet Mccauley is 59 year old male with pmhx of alcohol abuse, gastric ulcer and anemia who presents for evaluation of anemia and rectal bleeding. He notes rectal bleeding off and on for the past 4-5 years. He has also noted fluctuating hemoglobin over this time as well. Most recently his hgb was found to be very low at 6.1. he was advised to go to ED by his PCP for blood transfusion however he declined. He is currently awaiting outpatient blood transfusion through [facility]. He recently was having nausea and vomiting which he attributes to the flu. Denies hematemesis however. His last EGD and colonoscopy in 2018 for similar concerns (bleeding and anemia). He was found to have ulcers and esophageal candidiasis on initial EGD. Repeat EGD the following month in March 2018 was normal. Colonoscopy also in March 2018 revealed multiple precancerous polyps on biopsy. He is currently taking ibuprofen daily for joint aches. He does have hx of alcohol abuse but states he has stopped alcohol for the past week. He does have some shortness of breath over the past month. Denies chest pain or palpitations. He does have some dizziness if he gets up too quickly or bends down.       Family hx: no colon cancer in the family.       PROBLEM LIST  Patient Active Problem List    Diagnosis Date Noted     Intermediate risk for ASCVD (arteriosclerotic cardiovascular disease) 02/24/2020     Priority: Medium     Primary osteoarthritis of left knee 10/03/2019     Priority: Medium     AIDP (acute inflammatory demyelinating polyneuropathy) (H) 05/03/2019     Priority: Medium     COPD exacerbation (H) 01/02/2019     Priority: Medium     Normocytic anemia 05/17/2018     Priority: Medium     Leukocytosis with increased monocytes 05/17/2018      Priority: Medium     Generalized weakness 05/17/2018     Priority: Medium     Tubular adenoma of colon 03/23/2018     Priority: Medium     Collected: 3/18/2018   Received: 3/19/2018   Reported: 3/22/2018 19:19   Ordering Phy(s): SASKIA LEE     For improved result formatting, select 'View Enhanced Report Format' under    Linked Documents section.     SPECIMEN(S):   A: Splenic flexure polyp   B: Rectal polyp     FINAL DIAGNOSIS:   A.  Colon, splenic flexure, mucosal biopsies:   - Tubular adenoma.   - Negative for high-grade dysplasia and malignancy.     B.  Rectum, mucosal biopsy:   - Tubular adenoma.   - Negative for high-grade dysplasia and malignancy.        Peptic ulcer disease 03/16/2018     Priority: Medium     Uncomplicated alcohol dependence (H) 03/16/2018     Priority: Medium     Tobacco dependence syndrome 03/16/2018     Priority: Medium     Mild intermittent asthma without complication 11/13/2017     Priority: Medium     CKD (chronic kidney disease) stage 3, GFR 30-59 ml/min (H) 08/16/2017     Priority: Medium     Rosacea 08/16/2017     Priority: Medium     Sensorineural hearing loss, asymmetrical 03/28/2017     Priority: Medium     Thrombocytopenia (H) 11/11/2014     Priority: Medium     Universal ulcerative (chronic) colitis(556.6) (H) 11/11/2014     Priority: Medium     Alcoholic cirrhosis of liver (H) 11/04/2014     Priority: Medium     Coagulation defect (H) 11/04/2014     Priority: Medium     Acute alcoholic hepatitis 10/22/2014     Priority: Medium     Osteoarthrosis 02/21/2014     Priority: Medium     Essential hypertension 03/28/2011     Priority: Medium     Problem list name updated by automated process. Provider to review       Acute myeloid leukemia in remission (H) 03/28/2011     Priority: Medium     S/p chemo, did not need bone marrow transplant         PERTINENT PAST MEDICAL HISTORY:  (I personally reviewed this history with the patient at today's visit)   Past Medical History:   Diagnosis  Date     Alcohol dependence (H)     History of withdrawal and seizures     Alcoholic cirrhosis of liver (H)      AML (acute myeloid leukemia) in remission (H)     s/p chemo, no bone marrow transplant     Chronic obstructive pulmonary disease 5/17/2018     COPD (chronic obstructive pulmonary disease) (H)      History of pulmonary embolus (PE)      Hypertension      PUD (peptic ulcer disease)      Tobacco dependence      Ulcerative colitis (H)          PREVIOUS SURGERIES: (I personally reviewed this history with the patient at today's visit)   Past Surgical History:   Procedure Laterality Date     AS TOTAL KNEE ARTHROPLASTY Left      BONE MARROW BIOPSY, BONE SPECIMEN, NEEDLE/TROCAR N/A 6/12/2018    Procedure: BIOPSY BONE MARROW;  Bone Marrow Biopsy;  Surgeon: Demar Sapp MD;  Location: [facility]     ESOPHAGOSCOPY, GASTROSCOPY, DUODENOSCOPY (EGD), COMBINED N/A 2/8/2018    Procedure: COMBINED ESOPHAGOSCOPY, GASTROSCOPY, DUODENOSCOPY (EGD), BIOPSY SINGLE OR MULTIPLE;  gastroscopy with biopsies;  Surgeon: Raz Cobb MD;  Location: [facility]     ESOPHAGOSCOPY, GASTROSCOPY, DUODENOSCOPY (EGD), COMBINED N/A 3/18/2018    Procedure: COMBINED ESOPHAGOSCOPY, GASTROSCOPY, DUODENOSCOPY (EGD);;  Surgeon: Raz Cobb MD;  Location: [facility]     LACERATION REPAIR Right     Right leg     PHACOEMULSIFICATION WITH STANDARD INTRAOCULAR LENS IMPLANT Left 7/1/2019    Procedure: cataract removal with implant.;  Surgeon: Adrian Oliveira MD;  Location: [facility]     PHACOEMULSIFICATION WITH STANDARD INTRAOCULAR LENS IMPLANT Right 7/29/2019    Procedure: Cataract removal with implant.;  Surgeon: Adrian Oliveira MD;  Location: [facility]         ALLERGIES:     Allergies   Allergen Reactions     Blood Transfusion Related (Informational Only)      Patient has a history of a clinically significant antibody against RBC antigens.  A delay in compatible RBCs may occur.     Famotidine Unknown and Other (See Comments)     Severe  "abdominal cramps     Cyclobenzaprine Hives     Methocarbamol Other (See Comments)     Made pt's \"face break out\"     Pepcid Cramps     Severe abdominal cramps     Vancomycin Other (See Comments)     hallucinations     Vfend Other (See Comments)     IV - cold sweats, Iron tablet makes him nauseated and stomach ached     Hydrocodone-Acetaminophen Rash       PERTINENT MEDICATIONS:    Current Outpatient Medications:      acetaminophen 500 MG PO tablet, Take 1-2 tablets (500-1,000 mg) by mouth every 6 hours as needed for mild pain Do not take more than 3000mg acetaminophen total in one day., Disp: 100 tablet, Rfl: 3     acetaminophen-codeine 300-30 MG PO tablet, Take 1-2 tablets by mouth daily as needed for severe pain, Disp: 20 tablet, Rfl: 0     albuterol (VENTOLIN HFA) 108 (90 Base) MCG/ACT inhaler, Inhale 2 puffs into the lungs every 4 hours as needed for shortness of breath / dyspnea or wheezing, Disp: 18 g, Rfl: 11     atorvastatin 20 MG PO tablet, Take 1 tablet (20 mg) by mouth daily, Disp: 90 tablet, Rfl: 3     baclofen (LIORESAL) 10 MG tablet, TAKE 1/2 TO 1 TABLET BY MOUTH UP TO THREE TIMES A DAY, Disp: 90 tablet, Rfl: 3     benzocaine (ORABASE) 20 % PSTE paste, Take by mouth 4 times daily as needed for other (mouth pain), Disp: 1 Tube, Rfl: 0     buPROPion (WELLBUTRIN SR) 150 MG 12 hr tablet, Take once per day for 3 days and then increase to twice per day, Disp: 60 tablet, Rfl: 12     cholestyramine (QUESTRAN) 4 g packet, Take 1 packet (4 g) by mouth 2 times daily (with meals), Disp: 60 each, Rfl: 11     desonide (DESOWEN) 0.05 % external lotion, APPLY TOPICALLY AS NEEDED, Disp: 118 mL, Rfl: 1     furosemide (LASIX) 20 MG tablet, Take 1 tablet (20 mg) by mouth every morning, Disp: 90 tablet, Rfl: 3     gabapentin (NEURONTIN) 300 MG capsule, Take 2 capsules (600 mg) by mouth 3 times daily, Disp: 180 capsule, Rfl: 11     loratadine (CLARITIN) 10 MG tablet, Take 1 tablet (10 mg) by mouth daily as needed for " allergies, Disp: 30 tablet, Rfl: 11     mometasone-formoterol (DULERA) 200-5 MCG/ACT oral inhaler, Inhale 2 puffs into the lungs 2 times daily , Disp: , Rfl:      montelukast (SINGULAIR) 10 MG tablet, Take 1 tablet (10 mg) by mouth At Bedtime, Disp: 90 tablet, Rfl: 3     nicotine 2 MG MT lozenge, Place 1 lozenge (2 mg) inside cheek every hour as needed for smoking cessation, Disp: 108 lozenge, Rfl: 11     order for DME, Equipment being ordered: 4 wheel walker, Disp: 1 Units, Rfl: 0     order for DME, Equipment being ordered: 2 pairs thigh high compression stockings, strength per patient preference, Disp: 2 Units, Rfl: 1     order for DME, Equipment being ordered: Compression Stockings TWO (2) Pairs, 15-20 mm Hg., Disp: 2 Package, Rfl: prn     order for DME, Equipment being ordered: Manual wheelchair.  Estimated duration- 3 months, Disp: 1 Units, Rfl: 0     order for DME, Equipment being ordered: bed pull up bar, Disp: 1 Units, Rfl: 0     order for DME, Equipment being ordered: Wheelchair, Disp: 1 each, Rfl: 0     order for DME, Equipment being ordered: shower chair, Disp: 1 Device, Rfl: 0     pantoprazole (PROTONIX) 40 MG EC tablet, Take 1 tablet (40 mg) by mouth daily, Disp: 90 tablet, Rfl: 3     POTASSIUM CHLORIDE PO, , Disp: , Rfl:      propranolol (INDERAL) 20 MG tablet, Take 1 tablet (20 mg) by mouth 2 times daily, Disp: 180 tablet, Rfl: 3     rifaximin (XIFAXAN) 550 MG TABS tablet, Take 1 tablet (550 mg) by mouth 2 times daily, Disp: 180 tablet, Rfl: 3     SODIUM BICARBONATE PO, Take 650 mg by mouth daily as needed , Disp: , Rfl:      spironolactone (ALDACTONE) 25 MG tablet, Take 1 tablet (25 mg) by mouth daily, Disp: 90 tablet, Rfl: 3     sulfamethoxazole-trimethoprim 800-160 MG PO tablet, Take 1 tablet by mouth 2 times daily for 7 days, Disp: 14 tablet, Rfl: 0     tiotropium (SPIRIVA) 18 MCG capsule, Inhale 1 capsule into the lungs daily Using PRN, Disp: , Rfl:      traZODone (DESYREL) 50 MG tablet, Take 1  tablet (50 mg) by mouth At Bedtime, Disp: 90 tablet, Rfl: 3     vitamin B complex with vitamin C (STRESS TAB) tablet, Take 1 tablet by mouth daily, Disp: 90 tablet, Rfl: 3     vitamin B1 (THIAMINE) 100 MG tablet, Take 1 tablet (100 mg) by mouth daily, Disp: 90 tablet, Rfl: 3    SOCIAL HISTORY:  Social History     Socioeconomic History     Marital status: Single     Spouse name: Not on file     Number of children: Not on file     Years of education: Not on file     Highest education level: Not on file   Occupational History     Not on file   Social Needs     Financial resource strain: Not on file     Food insecurity:     Worry: Not on file     Inability: Not on file     Transportation needs:     Medical: Not on file     Non-medical: Not on file   Tobacco Use     Smoking status: Current Every Day Smoker     Packs/day: 0.50     Years: 30.00     Pack years: 15.00     Types: Cigarettes     Smokeless tobacco: Never Used     Tobacco comment: 5 cigarettes a day    Substance and Sexual Activity     Alcohol use: Yes     Comment: Down to 3 beers per day.  Sometimes a cocktail also.     Drug use: Yes     Types: Marijuana     Sexual activity: Not on file   Lifestyle     Physical activity:     Days per week: Not on file     Minutes per session: Not on file     Stress: Not on file   Relationships     Social connections:     Talks on phone: Not on file     Gets together: Not on file     Attends [employer] service: Not on file     Active member of club or organization: Not on file     Attends meetings of clubs or organizations: Not on file     Relationship status: Not on file     Intimate partner violence:     Fear of current or ex partner: Not on file     Emotionally abused: Not on file     Physically abused: Not on file     Forced sexual activity: Not on file   Other Topics Concern     Parent/sibling w/ CABG, MI or angioplasty before 65F 55M? Not Asked   Social History Narrative     Not on file       FAMILY HISTORY: (I personally  "reviewed this history with the patient at today's visit)  Family History   Problem Relation Age of Onset     Hypertension Mother      Coronary Artery Disease Father         MI        ROS:    No fevers or chills  No weight loss  No blurry vision, double vision or change in vision  No sore throat  No lymphadenopathy  No headache, paraesthesias, or weakness in a limb  No shortness of breath or wheezing  No chest pain or pressure  No arthralgias or myalgias  No rashes or skin changes  No odynophagia or dysphagia  No BRBPR, hematochezia, melena  No dysuria, frequency or urgency  No hot/cold intolerance or polyria  No anxiety or depression  PHYSICAL EXAMINATION:  Constitutional: aaox3, cooperative, pleasant, not dyspneic/diaphoretic, no acute distress  Vitals reviewed: /54 (BP Location: Left arm, Patient Position: Sitting, Cuff Size: Adult Regular)   Pulse 51   Ht 1.702 m (5' 7\")   Wt 85.5 kg (188 lb 8 oz)   SpO2 98%   BMI 29.52 kg/m     Wt:   Wt Readings from Last 2 Encounters:   02/26/20 85.5 kg (188 lb 8 oz)   02/19/20 89.4 kg (197 lb 3.2 oz)          Eyes: Sclera anicteric/injected  Ears/nose/mouth/throat: Normal oropharynx without ulcers or exudate, mucus membranes moist, hearing aids present   Neck: supple,  CV: No edema, RRR  Respiratory: Unlabored breathing, CTAB  Abd: Nondistended, no masses, +bs, no hepatosplenomegaly, nontender, no peritoneal signs  Skin: warm, perfused, no jaundice  Psych: Normal affect  MSK: ambulates with walker       PERTINENT STUDIES: (I personally reviewed these laboratory studies today)  Most recent CBC:   Recent Labs   Lab Test 02/19/20  1508 01/10/20  1059 12/05/19  1520  09/30/19  1329   WBC  --   --  8.6  --  8.1  Canceled, Test credited   HGB 6.1* 7.3* 7.6*   < > 7.2*   HCT  --   --  26.2*  --  24.9*   PLT  --   --  156  --  230    < > = values in this interval not displayed.     Most recent hepatic panel:  Recent Labs   Lab Test 09/30/19  1329 07/08/19  1408   ALT 21 15 "   AST 34 25     Most recent creatinine:  Recent Labs   Lab Test 09/30/19  1329 08/28/19  1205   CR 2.14* 1.93*       TSH   Date Value Ref Range Status   12/05/2018 0.94 0.40 - 4.00 mU/L Final       ASSESSMENT/PLAN:    Abhijeet Mccauley is a 59 year old male who presents for evaluation of rectal bleeding and worsening anemia. Referred to our office for further work up. Noted to have low hgb recently of 6.1. He was advised to go to ED for blood transfusion however refused and is waiting for outpatient blood transfusion at [facility]. ED warnings given to patient today. He verbalizes understanding but is still refusing ED at this time. Again strict ED warnings given. At this time will increase protonix to BID dosing as he does have hx of ulcers and admits to NSAID use. Recommended further work up of his recent worsening anemia with EGD and colonoscopy. He will need hgb check prior to same day of procedures. If work up unremarkable, need to consider video capsule endoscopy to r/o small bowel cause for anemia.     Further recommendations after EGD/Colonoscopy.     Anemia, unspecified type  Anemia due to blood loss, acute  Rectal bleeding  Gastroesophageal reflux disease without esophagitis  - pantoprazole (PROTONIX) 40 MG EC tablet  Dispense: 120 tablet; Refill: 0  History of gastric ulcer  History of colonic polyps    Orders Placed This Encounter   Procedures     GASTROENTEROLOGY ADULT REF PROCEDURE ONLY [facility] [phone]       Thank you for this consultation.  It was a pleasure to participate in the care of this patient; please contact us with any further questions.    This note was created with voice recognition software, and while reviewed for accuracy, typos may remain.     Oumar White PA-C  Gastroenterology  [facility]

## 2020-02-26 NOTE — NURSING NOTE
"Abhijeet Mccauley's goals for this visit include:   Chief Complaint   Patient presents with     RECHECK     Has had blood in his stool but not today/GI bleed, surgery on 3/24 needing to be seen prior to surgery       He requests these members of his care team be copied on today's visit information: yes    PCP: Chidi Valenzuela    Referring Provider:  No referring provider defined for this encounter.    /54 (BP Location: Left arm, Patient Position: Sitting, Cuff Size: Adult Regular)   Pulse 51   Ht 1.702 m (5' 7\")   Wt 85.5 kg (188 lb 8 oz)   SpO2 98%   BMI 29.52 kg/m      Do you need any medication refills at today's visit? No  Liam Richmond student MA      "

## 2020-02-27 ENCOUNTER — HOSPITAL ENCOUNTER (INPATIENT)
Facility: CLINIC | Age: 60
LOS: 1 days | Discharge: LEFT BEFORE DISCHARGE | End: 2020-02-28
Attending: EMERGENCY MEDICINE | Admitting: INTERNAL MEDICINE
Payer: COMMERCIAL

## 2020-02-27 DIAGNOSIS — F10.20 UNCOMPLICATED ALCOHOL DEPENDENCE (H): ICD-10-CM

## 2020-02-27 DIAGNOSIS — D62 ANEMIA DUE TO BLOOD LOSS, ACUTE: ICD-10-CM

## 2020-02-27 DIAGNOSIS — F10.10 ALCOHOL ABUSE: ICD-10-CM

## 2020-02-27 DIAGNOSIS — K92.2 GASTROINTESTINAL HEMORRHAGE, UNSPECIFIED GASTROINTESTINAL HEMORRHAGE TYPE: ICD-10-CM

## 2020-02-27 DIAGNOSIS — K92.2 ACUTE GASTROINTESTINAL HEMORRHAGE: ICD-10-CM

## 2020-02-27 DIAGNOSIS — D62 ACUTE POSTHEMORRHAGIC ANEMIA: ICD-10-CM

## 2020-02-27 PROBLEM — N18.30 CKD (CHRONIC KIDNEY DISEASE) STAGE 3, GFR 30-59 ML/MIN (H): Status: ACTIVE | Noted: 2017-08-16

## 2020-02-27 PROBLEM — D64.9 NORMOCYTIC ANEMIA: Status: ACTIVE | Noted: 2018-05-17

## 2020-02-27 PROBLEM — J44.1 COPD EXACERBATION (H): Status: RESOLVED | Noted: 2019-01-02 | Resolved: 2020-02-27

## 2020-02-27 PROBLEM — I85.00 ESOPHAGEAL VARICES (H): Status: ACTIVE | Noted: 2020-02-27

## 2020-02-27 PROBLEM — F17.200 TOBACCO DEPENDENCE SYNDROME: Status: ACTIVE | Noted: 2018-03-16

## 2020-02-27 PROBLEM — J45.20 MILD INTERMITTENT ASTHMA WITHOUT COMPLICATION: Status: ACTIVE | Noted: 2017-11-13

## 2020-02-27 LAB
ABO + RH BLD: ABNORMAL
ABO + RH BLD: ABNORMAL
ALBUMIN SERPL-MCNC: 3.6 G/DL (ref 3.4–5)
ALP SERPL-CCNC: 256 U/L (ref 40–150)
ALT SERPL W P-5'-P-CCNC: 26 U/L (ref 0–70)
ANION GAP SERPL CALCULATED.3IONS-SCNC: 13 MMOL/L (ref 3–14)
AST SERPL W P-5'-P-CCNC: 36 U/L (ref 0–45)
BASOPHILS # BLD AUTO: 0.1 10E9/L (ref 0–0.2)
BASOPHILS NFR BLD AUTO: 0.8 %
BILIRUB SERPL-MCNC: 1.4 MG/DL (ref 0.2–1.3)
BLD GP AB SCN SERPL QL: ABNORMAL
BLD PROD TYP BPU: ABNORMAL
BLD PROD TYP BPU: NORMAL
BLD PROD TYP BPU: NORMAL
BLD UNIT ID BPU: 0
BLD UNIT ID BPU: 0
BLOOD BANK CMNT PATIENT-IMP: ABNORMAL
BLOOD PRODUCT CODE: NORMAL
BLOOD PRODUCT CODE: NORMAL
BPU ID: NORMAL
BPU ID: NORMAL
BUN SERPL-MCNC: 28 MG/DL (ref 7–30)
CALCIUM SERPL-MCNC: 8.9 MG/DL (ref 8.5–10.1)
CHLORIDE SERPL-SCNC: 105 MMOL/L (ref 94–109)
CO2 SERPL-SCNC: 17 MMOL/L (ref 20–32)
CREAT SERPL-MCNC: 2.94 MG/DL (ref 0.66–1.25)
DIFFERENTIAL METHOD BLD: ABNORMAL
EOSINOPHIL # BLD AUTO: 0.4 10E9/L (ref 0–0.7)
EOSINOPHIL NFR BLD AUTO: 3.5 %
ERYTHROCYTE [DISTWIDTH] IN BLOOD BY AUTOMATED COUNT: 19.2 % (ref 10–15)
ETHANOL SERPL-MCNC: 0.05 G/DL
GFR SERPL CREATININE-BSD FRML MDRD: 22 ML/MIN/{1.73_M2}
GLUCOSE SERPL-MCNC: 100 MG/DL (ref 70–99)
HCT VFR BLD AUTO: 20.7 % (ref 40–53)
HGB BLD-MCNC: 5.8 G/DL (ref 13.3–17.7)
IMM GRANULOCYTES # BLD: 0.1 10E9/L (ref 0–0.4)
IMM GRANULOCYTES NFR BLD: 0.5 %
INR PPP: 1.58 (ref 0.86–1.14)
LYMPHOCYTES # BLD AUTO: 1.4 10E9/L (ref 0.8–5.3)
LYMPHOCYTES NFR BLD AUTO: 14.3 %
MCH RBC QN AUTO: 23.1 PG (ref 26.5–33)
MCHC RBC AUTO-ENTMCNC: 28 G/DL (ref 31.5–36.5)
MCV RBC AUTO: 83 FL (ref 78–100)
MONOCYTES # BLD AUTO: 1.5 10E9/L (ref 0–1.3)
MONOCYTES NFR BLD AUTO: 15.2 %
NEUTROPHILS # BLD AUTO: 6.6 10E9/L (ref 1.6–8.3)
NEUTROPHILS NFR BLD AUTO: 65.7 %
NRBC # BLD AUTO: 0 10*3/UL
NRBC BLD AUTO-RTO: 0 /100
NUM BPU REQUESTED: 2
PLATELET # BLD AUTO: 129 10E9/L (ref 150–450)
POTASSIUM SERPL-SCNC: 4.6 MMOL/L (ref 3.4–5.3)
PROT SERPL-MCNC: 7.4 G/DL (ref 6.8–8.8)
RBC # BLD AUTO: 2.51 10E12/L (ref 4.4–5.9)
SODIUM SERPL-SCNC: 135 MMOL/L (ref 133–144)
SPECIMEN EXP DATE BLD: ABNORMAL
TRANSFUSION STATUS PATIENT QL: NORMAL
WBC # BLD AUTO: 10.1 10E9/L (ref 4–11)

## 2020-02-27 PROCEDURE — 12000000 ZZH R&B MED SURG/OB

## 2020-02-27 PROCEDURE — 25000132 ZZH RX MED GY IP 250 OP 250 PS 637: Performed by: PHYSICIAN ASSISTANT

## 2020-02-27 PROCEDURE — HZ2ZZZZ DETOXIFICATION SERVICES FOR SUBSTANCE ABUSE TREATMENT: ICD-10-PCS | Performed by: FAMILY MEDICINE

## 2020-02-27 PROCEDURE — P9016 RBC LEUKOCYTES REDUCED: HCPCS | Performed by: STUDENT IN AN ORGANIZED HEALTH CARE EDUCATION/TRAINING PROGRAM

## 2020-02-27 PROCEDURE — 96361 HYDRATE IV INFUSION ADD-ON: CPT | Performed by: STUDENT IN AN ORGANIZED HEALTH CARE EDUCATION/TRAINING PROGRAM

## 2020-02-27 PROCEDURE — 99285 EMERGENCY DEPT VISIT HI MDM: CPT | Mod: 25 | Performed by: EMERGENCY MEDICINE

## 2020-02-27 PROCEDURE — 99285 EMERGENCY DEPT VISIT HI MDM: CPT | Mod: Z6 | Performed by: EMERGENCY MEDICINE

## 2020-02-27 PROCEDURE — 86922 COMPATIBILITY TEST ANTIGLOB: CPT | Performed by: STUDENT IN AN ORGANIZED HEALTH CARE EDUCATION/TRAINING PROGRAM

## 2020-02-27 PROCEDURE — 96374 THER/PROPH/DIAG INJ IV PUSH: CPT | Performed by: STUDENT IN AN ORGANIZED HEALTH CARE EDUCATION/TRAINING PROGRAM

## 2020-02-27 PROCEDURE — 86900 BLOOD TYPING SEROLOGIC ABO: CPT | Performed by: STUDENT IN AN ORGANIZED HEALTH CARE EDUCATION/TRAINING PROGRAM

## 2020-02-27 PROCEDURE — C9113 INJ PANTOPRAZOLE SODIUM, VIA: HCPCS | Performed by: EMERGENCY MEDICINE

## 2020-02-27 PROCEDURE — 99285 EMERGENCY DEPT VISIT HI MDM: CPT | Mod: 25 | Performed by: STUDENT IN AN ORGANIZED HEALTH CARE EDUCATION/TRAINING PROGRAM

## 2020-02-27 PROCEDURE — 96374 THER/PROPH/DIAG INJ IV PUSH: CPT | Performed by: EMERGENCY MEDICINE

## 2020-02-27 PROCEDURE — 86850 RBC ANTIBODY SCREEN: CPT | Performed by: STUDENT IN AN ORGANIZED HEALTH CARE EDUCATION/TRAINING PROGRAM

## 2020-02-27 PROCEDURE — 86902 BLOOD TYPE ANTIGEN DONOR EA: CPT | Performed by: STUDENT IN AN ORGANIZED HEALTH CARE EDUCATION/TRAINING PROGRAM

## 2020-02-27 PROCEDURE — 85025 COMPLETE CBC W/AUTO DIFF WBC: CPT | Performed by: STUDENT IN AN ORGANIZED HEALTH CARE EDUCATION/TRAINING PROGRAM

## 2020-02-27 PROCEDURE — 86901 BLOOD TYPING SEROLOGIC RH(D): CPT | Performed by: STUDENT IN AN ORGANIZED HEALTH CARE EDUCATION/TRAINING PROGRAM

## 2020-02-27 PROCEDURE — 80320 DRUG SCREEN QUANTALCOHOLS: CPT | Performed by: EMERGENCY MEDICINE

## 2020-02-27 PROCEDURE — 99223 1ST HOSP IP/OBS HIGH 75: CPT | Mod: AI | Performed by: PHYSICIAN ASSISTANT

## 2020-02-27 PROCEDURE — 80053 COMPREHEN METABOLIC PANEL: CPT | Performed by: STUDENT IN AN ORGANIZED HEALTH CARE EDUCATION/TRAINING PROGRAM

## 2020-02-27 PROCEDURE — 96361 HYDRATE IV INFUSION ADD-ON: CPT | Performed by: EMERGENCY MEDICINE

## 2020-02-27 PROCEDURE — 25800030 ZZH RX IP 258 OP 636: Performed by: STUDENT IN AN ORGANIZED HEALTH CARE EDUCATION/TRAINING PROGRAM

## 2020-02-27 PROCEDURE — 85610 PROTHROMBIN TIME: CPT | Performed by: STUDENT IN AN ORGANIZED HEALTH CARE EDUCATION/TRAINING PROGRAM

## 2020-02-27 PROCEDURE — 25000128 H RX IP 250 OP 636: Performed by: EMERGENCY MEDICINE

## 2020-02-27 RX ORDER — LANOLIN ALCOHOL/MO/W.PET/CERES
100 CREAM (GRAM) TOPICAL DAILY
Status: DISCONTINUED | OUTPATIENT
Start: 2020-02-28 | End: 2020-02-28 | Stop reason: HOSPADM

## 2020-02-27 RX ORDER — TRAZODONE HYDROCHLORIDE 50 MG/1
50 TABLET, FILM COATED ORAL AT BEDTIME
Status: DISCONTINUED | OUTPATIENT
Start: 2020-02-27 | End: 2020-02-28 | Stop reason: HOSPADM

## 2020-02-27 RX ORDER — SODIUM CHLORIDE, SODIUM LACTATE, POTASSIUM CHLORIDE, CALCIUM CHLORIDE 600; 310; 30; 20 MG/100ML; MG/100ML; MG/100ML; MG/100ML
INJECTION, SOLUTION INTRAVENOUS CONTINUOUS
Status: DISCONTINUED | OUTPATIENT
Start: 2020-02-27 | End: 2020-02-28 | Stop reason: HOSPADM

## 2020-02-27 RX ORDER — NALOXONE HYDROCHLORIDE 0.4 MG/ML
.1-.4 INJECTION, SOLUTION INTRAMUSCULAR; INTRAVENOUS; SUBCUTANEOUS
Status: DISCONTINUED | OUTPATIENT
Start: 2020-02-27 | End: 2020-02-28 | Stop reason: HOSPADM

## 2020-02-27 RX ORDER — ONDANSETRON 2 MG/ML
4 INJECTION INTRAMUSCULAR; INTRAVENOUS EVERY 6 HOURS PRN
Status: DISCONTINUED | OUTPATIENT
Start: 2020-02-27 | End: 2020-02-28 | Stop reason: HOSPADM

## 2020-02-27 RX ORDER — ALBUTEROL SULFATE 5 MG/ML
2.5 SOLUTION RESPIRATORY (INHALATION)
Status: DISCONTINUED | OUTPATIENT
Start: 2020-02-27 | End: 2020-02-28 | Stop reason: HOSPADM

## 2020-02-27 RX ORDER — MONTELUKAST SODIUM 10 MG/1
10 TABLET ORAL AT BEDTIME
Status: DISCONTINUED | OUTPATIENT
Start: 2020-02-27 | End: 2020-02-28 | Stop reason: HOSPADM

## 2020-02-27 RX ORDER — BACLOFEN 10 MG/1
10 TABLET ORAL 3 TIMES DAILY
Status: DISCONTINUED | OUTPATIENT
Start: 2020-02-27 | End: 2020-02-28 | Stop reason: HOSPADM

## 2020-02-27 RX ORDER — BUPROPION HYDROCHLORIDE 150 MG/1
150 TABLET, EXTENDED RELEASE ORAL 2 TIMES DAILY
Status: DISCONTINUED | OUTPATIENT
Start: 2020-02-27 | End: 2020-02-28 | Stop reason: HOSPADM

## 2020-02-27 RX ORDER — PROCHLORPERAZINE 25 MG
25 SUPPOSITORY, RECTAL RECTAL EVERY 12 HOURS PRN
Status: DISCONTINUED | OUTPATIENT
Start: 2020-02-27 | End: 2020-02-28 | Stop reason: HOSPADM

## 2020-02-27 RX ORDER — ACETAMINOPHEN 325 MG/1
650 TABLET ORAL EVERY 4 HOURS PRN
Status: DISCONTINUED | OUTPATIENT
Start: 2020-02-27 | End: 2020-02-28 | Stop reason: HOSPADM

## 2020-02-27 RX ORDER — SODIUM CHLORIDE 9 MG/ML
INJECTION, SOLUTION INTRAVENOUS CONTINUOUS
Status: DISCONTINUED | OUTPATIENT
Start: 2020-02-27 | End: 2020-02-28 | Stop reason: HOSPADM

## 2020-02-27 RX ORDER — ATORVASTATIN CALCIUM 20 MG/1
20 TABLET, FILM COATED ORAL DAILY
Status: DISCONTINUED | OUTPATIENT
Start: 2020-02-28 | End: 2020-02-28 | Stop reason: HOSPADM

## 2020-02-27 RX ORDER — ONDANSETRON 2 MG/ML
4 INJECTION INTRAMUSCULAR; INTRAVENOUS EVERY 6 HOURS PRN
Status: DISCONTINUED | OUTPATIENT
Start: 2020-02-27 | End: 2020-02-27

## 2020-02-27 RX ORDER — LORAZEPAM 2 MG/ML
1-2 INJECTION INTRAMUSCULAR EVERY 30 MIN PRN
Status: DISCONTINUED | OUTPATIENT
Start: 2020-02-27 | End: 2020-02-28 | Stop reason: HOSPADM

## 2020-02-27 RX ORDER — LORAZEPAM 1 MG/1
1-2 TABLET ORAL EVERY 30 MIN PRN
Status: DISCONTINUED | OUTPATIENT
Start: 2020-02-27 | End: 2020-02-28 | Stop reason: HOSPADM

## 2020-02-27 RX ORDER — POLYETHYLENE GLYCOL 3350 17 G
2 POWDER IN PACKET (EA) ORAL
Status: DISCONTINUED | OUTPATIENT
Start: 2020-02-27 | End: 2020-02-28 | Stop reason: HOSPADM

## 2020-02-27 RX ORDER — SPIRONOLACTONE 50 MG/1
25 TABLET, FILM COATED ORAL DAILY
Status: ON HOLD | COMMUNITY
Start: 2020-01-30 | End: 2020-07-18

## 2020-02-27 RX ORDER — ONDANSETRON 4 MG/1
4 TABLET, ORALLY DISINTEGRATING ORAL EVERY 6 HOURS PRN
Status: DISCONTINUED | OUTPATIENT
Start: 2020-02-27 | End: 2020-02-28 | Stop reason: HOSPADM

## 2020-02-27 RX ORDER — LIDOCAINE 40 MG/G
CREAM TOPICAL
Status: DISCONTINUED | OUTPATIENT
Start: 2020-02-27 | End: 2020-02-28 | Stop reason: HOSPADM

## 2020-02-27 RX ORDER — PROPRANOLOL HYDROCHLORIDE 20 MG/1
20 TABLET ORAL 2 TIMES DAILY
Status: DISCONTINUED | OUTPATIENT
Start: 2020-02-27 | End: 2020-02-28 | Stop reason: HOSPADM

## 2020-02-27 RX ORDER — PROCHLORPERAZINE MALEATE 5 MG
10 TABLET ORAL EVERY 6 HOURS PRN
Status: DISCONTINUED | OUTPATIENT
Start: 2020-02-27 | End: 2020-02-28 | Stop reason: HOSPADM

## 2020-02-27 RX ORDER — ONDANSETRON 4 MG/1
4 TABLET, ORALLY DISINTEGRATING ORAL EVERY 6 HOURS PRN
Status: DISCONTINUED | OUTPATIENT
Start: 2020-02-27 | End: 2020-02-27

## 2020-02-27 RX ORDER — FOLIC ACID 1 MG/1
1 TABLET ORAL DAILY
Status: DISCONTINUED | OUTPATIENT
Start: 2020-02-28 | End: 2020-02-28 | Stop reason: HOSPADM

## 2020-02-27 RX ORDER — MULTIPLE VITAMINS W/ MINERALS TAB 9MG-400MCG
1 TAB ORAL DAILY
Status: DISCONTINUED | OUTPATIENT
Start: 2020-02-28 | End: 2020-02-28 | Stop reason: HOSPADM

## 2020-02-27 RX ORDER — GABAPENTIN 300 MG/1
300 CAPSULE ORAL 3 TIMES DAILY
Status: DISCONTINUED | OUTPATIENT
Start: 2020-02-27 | End: 2020-02-28 | Stop reason: HOSPADM

## 2020-02-27 RX ADMIN — MONTELUKAST 10 MG: 10 TABLET, FILM COATED ORAL at 22:07

## 2020-02-27 RX ADMIN — TRAZODONE HYDROCHLORIDE 50 MG: 50 TABLET ORAL at 22:07

## 2020-02-27 RX ADMIN — BACLOFEN 10 MG: 10 TABLET ORAL at 19:37

## 2020-02-27 RX ADMIN — PANTOPRAZOLE SODIUM 40 MG: 40 INJECTION, POWDER, FOR SOLUTION INTRAVENOUS at 16:08

## 2020-02-27 RX ADMIN — SODIUM CHLORIDE, POTASSIUM CHLORIDE, SODIUM LACTATE AND CALCIUM CHLORIDE 1000 ML: 600; 310; 30; 20 INJECTION, SOLUTION INTRAVENOUS at 16:08

## 2020-02-27 RX ADMIN — PROPRANOLOL HYDROCHLORIDE 20 MG: 20 TABLET ORAL at 19:37

## 2020-02-27 ASSESSMENT — MIFFLIN-ST. JEOR: SCORE: 1616.39

## 2020-02-27 ASSESSMENT — ACTIVITIES OF DAILY LIVING (ADL): ADLS_ACUITY_SCORE: 16

## 2020-02-27 NOTE — ED NOTES
Pt presents to ED with concerns of low hemoglobin, states had it drawn last week and it was 6.8. Pt reports bright red blood spots on his stool.

## 2020-02-27 NOTE — ED PROVIDER NOTES
History     Chief Complaint   Patient presents with     Fatigue     was told by MD to come in for blood transfusion      HPI  Abhijeet Mccauley is a 59 year old male with h/o alcohol abuse, gastric ulcer and anemia who presents to the emergency department today with fatigue.  He has a history of rectal bleeding and worsening anemia and was recently seen at his primary care clinic as part of evaluation for this.  He was found to have a hemoglobin of 6.1 he was referred to the ED for a blood transfusion but he declined this and has been trying to see the transfusion center to get this scheduled.  Unfortunately, he was not able to get him for at least another week which prompted him to come to the emergency department.  His main complaint is fatigue and some mild shortness of breath.  He denies any chest pain, abdominal pain, fevers, chills, cough, nausea, vomiting, hemoptysis, hematemesis, or other concerning symptoms.  He reports intermittent bright red blood on his stools.  He denies any dark tarry stools.  He reports that he is scheduled for colonoscopy and EGD.  He is currently on twice daily Protonix.  He states that he does need bilateral hip arthroplasty but has to get this GI bleeding work-up prior to that.  He is a chronic smoker and has a history of alcohol abuse including pretty significant alcohol withdrawal.  Unfortunately, he continues to smoke and is a daily drinker.  He drinks approximately one sixpack of beer a day and reports that he did have a sixpack yesterday and a single beer so far today.  He is declining rectal examination guaiac testing at this time but his story is pretty convincing for a lower GI source of bleed.  Of note, he does report a significant history of alcohol withdrawal including delirium tremens.    Allergies:  Allergies   Allergen Reactions     Blood Transfusion Related (Informational Only)      Patient has a history of a clinically significant antibody against RBC antigens.   "A delay in compatible RBCs may occur.     Famotidine Unknown and Other (See Comments)     Severe abdominal cramps     Cyclobenzaprine Hives     Methocarbamol Other (See Comments)     Made pt's \"face break out\"     Pepcid Cramps     Severe abdominal cramps     Vancomycin Other (See Comments)     hallucinations     Vfend Other (See Comments)     IV - cold sweats, Iron tablet makes him nauseated and stomach ached     Hydrocodone-Acetaminophen Rash       Problem List:    Patient Active Problem List    Diagnosis Date Noted     Intermediate risk for ASCVD (arteriosclerotic cardiovascular disease) 02/24/2020     Priority: Medium     Primary osteoarthritis of left knee 10/03/2019     Priority: Medium     AIDP (acute inflammatory demyelinating polyneuropathy) (H) 05/03/2019     Priority: Medium     COPD exacerbation (H) 01/02/2019     Priority: Medium     Normocytic anemia 05/17/2018     Priority: Medium     Leukocytosis with increased monocytes 05/17/2018     Priority: Medium     Generalized weakness 05/17/2018     Priority: Medium     Tubular adenoma of colon 03/23/2018     Priority: Medium     Collected: 3/18/2018   Received: 3/19/2018   Reported: 3/22/2018 19:19   Ordering Phy(s): SASKIA LEE     For improved result formatting, select 'View Enhanced Report Format' under    Linked Documents section.     SPECIMEN(S):   A: Splenic flexure polyp   B: Rectal polyp     FINAL DIAGNOSIS:   A.  Colon, splenic flexure, mucosal biopsies:   - Tubular adenoma.   - Negative for high-grade dysplasia and malignancy.     B.  Rectum, mucosal biopsy:   - Tubular adenoma.   - Negative for high-grade dysplasia and malignancy.        Peptic ulcer disease 03/16/2018     Priority: Medium     Uncomplicated alcohol dependence (H) 03/16/2018     Priority: Medium     Tobacco dependence syndrome 03/16/2018     Priority: Medium     Mild intermittent asthma without complication 11/13/2017     Priority: Medium     CKD (chronic kidney disease) stage 3, " GFR 30-59 ml/min (H) 08/16/2017     Priority: Medium     Rosacea 08/16/2017     Priority: Medium     Sensorineural hearing loss, asymmetrical 03/28/2017     Priority: Medium     Thrombocytopenia (H) 11/11/2014     Priority: Medium     Universal ulcerative (chronic) colitis(556.6) (H) 11/11/2014     Priority: Medium     Alcoholic cirrhosis of liver (H) 11/04/2014     Priority: Medium     Coagulation defect (H) 11/04/2014     Priority: Medium     Acute alcoholic hepatitis 10/22/2014     Priority: Medium     Osteoarthrosis 02/21/2014     Priority: Medium     Essential hypertension 03/28/2011     Priority: Medium     Problem list name updated by automated process. Provider to review       Acute myeloid leukemia in remission (H) 03/28/2011     Priority: Medium     S/p chemo, did not need bone marrow transplant          Past Medical History:    Past Medical History:   Diagnosis Date     Alcohol dependence (H)      Alcoholic cirrhosis of liver (H)      AML (acute myeloid leukemia) in remission (H)      Chronic obstructive pulmonary disease 5/17/2018     COPD (chronic obstructive pulmonary disease) (H)      History of pulmonary embolus (PE)      Hypertension      PUD (peptic ulcer disease)      Tobacco dependence      Ulcerative colitis (H)        Past Surgical History:    Past Surgical History:   Procedure Laterality Date     AS TOTAL KNEE ARTHROPLASTY Left      BONE MARROW BIOPSY, BONE SPECIMEN, NEEDLE/TROCAR N/A 6/12/2018    Procedure: BIOPSY BONE MARROW;  Bone Marrow Biopsy;  Surgeon: Demar Sapp MD;  Location: [facility]     ESOPHAGOSCOPY, GASTROSCOPY, DUODENOSCOPY (EGD), COMBINED N/A 2/8/2018    Procedure: COMBINED ESOPHAGOSCOPY, GASTROSCOPY, DUODENOSCOPY (EGD), BIOPSY SINGLE OR MULTIPLE;  gastroscopy with biopsies;  Surgeon: Raz Cobb MD;  Location: [facility]     ESOPHAGOSCOPY, GASTROSCOPY, DUODENOSCOPY (EGD), COMBINED N/A 3/18/2018    Procedure: COMBINED ESOPHAGOSCOPY, GASTROSCOPY, DUODENOSCOPY (EGD);;   Surgeon: Raz Cobb MD;  Location: [facility]     LACERATION REPAIR Right     Right leg     PHACOEMULSIFICATION WITH STANDARD INTRAOCULAR LENS IMPLANT Left 7/1/2019    Procedure: cataract removal with implant.;  Surgeon: Adrian Oliveira MD;  Location: [facility]     PHACOEMULSIFICATION WITH STANDARD INTRAOCULAR LENS IMPLANT Right 7/29/2019    Procedure: Cataract removal with implant.;  Surgeon: Adrian Oliveira MD;  Location: [facility]       Family History:    Family History   Problem Relation Age of Onset     Hypertension Mother      Coronary Artery Disease Father         MI       Social History:  Marital Status:  Single [1]  Social History     Tobacco Use     Smoking status: Current Every Day Smoker     Packs/day: 0.50     Years: 30.00     Pack years: 15.00     Types: Cigarettes     Smokeless tobacco: Never Used     Tobacco comment: 5 cigarettes a day    Substance Use Topics     Alcohol use: Yes     Comment: Down to 3 beers per day.  Sometimes a cocktail also.     Drug use: Yes     Types: Marijuana        Medications:    acetaminophen 500 MG PO tablet  acetaminophen-codeine 300-30 MG PO tablet  atorvastatin 20 MG PO tablet  baclofen (LIORESAL) 10 MG tablet  buPROPion (WELLBUTRIN SR) 150 MG 12 hr tablet  furosemide (LASIX) 20 MG tablet  gabapentin (NEURONTIN) 300 MG capsule  loratadine (CLARITIN) 10 MG tablet  mometasone-formoterol (DULERA) 200-5 MCG/ACT oral inhaler  montelukast (SINGULAIR) 10 MG tablet  order for DME  pantoprazole (PROTONIX) 40 MG EC tablet  POTASSIUM CHLORIDE PO  propranolol (INDERAL) 20 MG tablet  spironolactone (ALDACTONE) 50 MG tablet  tiotropium (SPIRIVA) 18 MCG capsule  traZODone (DESYREL) 50 MG tablet  albuterol (VENTOLIN HFA) 108 (90 Base) MCG/ACT inhaler  benzocaine (ORABASE) 20 % PSTE paste  desonide (DESOWEN) 0.05 % external lotion  nicotine 2 MG MT lozenge  order for DME  order for DME  order for DME  order for DME  rifaximin (XIFAXAN) 550 MG TABS tablet          Review of  "Systems  Remainder of ROS negative unless noted in HPI.     Physical Exam   BP: (!) 144/65  Pulse: 78  Temp: 98.1  F (36.7  C)  Height: 170.2 cm (5' 7\")  Weight: 85.3 kg (188 lb)  SpO2: 97 %  Physical Exam  Nursing note and vitals were reviewed.  Constitutional: Awake and alert, adequately nourished and developed appearing 59-year-old in no apparent discomfort, who does not appear acutely ill, and who answers questions appropriately and cooperates with examination.  HEENT: EOMI.   Neck: Freely mobile.  Cardiovascular: Cardiac examination reveals normal heart rate and regular rhythm without murmur.  Pulmonary/Chest: Breathing is unlabored.  Breath sounds are clear and equal bilaterally.  There no retractions, tachypnea, rales, or ronchi. Very mild wheeze noted.  Abdomen: Soft, nontender, no HSM or masses rebound or guarding.  Rectal: No hemorrhoids noted.  Unremarkable exam.  Guaiac positive.   Musculoskeletal: Extremities are warm and well-perfused and without edema  Neurological: Alert, oriented, thought content logical, coherent   Skin: Warm, dry, no rashes. Mild jaundice noted.  Psychiatric: Affect broad and appropriate.     ED Course     ED Course as of Feb 29 1351   Thu Feb 27, 2020   1441 INR(!): 1.58   1446 WBC: 10.1   1446 Hemoglobin(!!): 5.8   1446 Platelet Count(!): 129   1446 Alkaline Phosphatase(!): 256   1447 ALT: 26   1447 AST: 36   1447 Bilirubin Total(!): 1.4   1447 Baseline 1.9   Creatinine(!): 2.94   1500 Guaiac positive. No obvious bleeding on exam.      1615 Ethanol g/dL(!): 0.05     Procedures    Critical Care time:  none    Results for orders placed or performed during the hospital encounter of 02/27/20 (from the past 24 hour(s))   CBC with platelets, differential   Result Value Ref Range    WBC 10.1 4.0 - 11.0 10e9/L    RBC Count 2.51 (L) 4.4 - 5.9 10e12/L    Hemoglobin 5.8 (LL) 13.3 - 17.7 g/dL    Hematocrit 20.7 (L) 40.0 - 53.0 %    MCV 83 78 - 100 fl    MCH 23.1 (L) 26.5 - 33.0 pg    MCHC " 28.0 (L) 31.5 - 36.5 g/dL    RDW 19.2 (H) 10.0 - 15.0 %    Platelet Count 129 (L) 150 - 450 10e9/L    Diff Method Automated Method     % Neutrophils 65.7 %    % Lymphocytes 14.3 %    % Monocytes 15.2 %    % Eosinophils 3.5 %    % Basophils 0.8 %    % Immature Granulocytes 0.5 %    Nucleated RBCs 0 0 /100    Absolute Neutrophil 6.6 1.6 - 8.3 10e9/L    Absolute Lymphocytes 1.4 0.8 - 5.3 10e9/L    Absolute Monocytes 1.5 (H) 0.0 - 1.3 10e9/L    Absolute Eosinophils 0.4 0.0 - 0.7 10e9/L    Absolute Basophils 0.1 0.0 - 0.2 10e9/L    Abs Immature Granulocytes 0.1 0 - 0.4 10e9/L    Absolute Nucleated RBC 0.0    INR   Result Value Ref Range    INR 1.58 (H) 0.86 - 1.14   Comprehensive metabolic panel   Result Value Ref Range    Sodium 135 133 - 144 mmol/L    Potassium 4.6 3.4 - 5.3 mmol/L    Chloride 105 94 - 109 mmol/L    Carbon Dioxide 17 (L) 20 - 32 mmol/L    Anion Gap 13 3 - 14 mmol/L    Glucose 100 (H) 70 - 99 mg/dL    Urea Nitrogen 28 7 - 30 mg/dL    Creatinine 2.94 (H) 0.66 - 1.25 mg/dL    GFR Estimate 22 (L) >60 mL/min/[1.73_m2]    GFR Estimate If Black 25 (L) >60 mL/min/[1.73_m2]    Calcium 8.9 8.5 - 10.1 mg/dL    Bilirubin Total 1.4 (H) 0.2 - 1.3 mg/dL    Albumin 3.6 3.4 - 5.0 g/dL    Protein Total 7.4 6.8 - 8.8 g/dL    Alkaline Phosphatase 256 (H) 40 - 150 U/L    ALT 26 0 - 70 U/L    AST 36 0 - 45 U/L       Medications   pantoprazole (PROTONIX) 40 mg IV push injection (has no administration in time range)   lactated ringers BOLUS 1,000 mL (has no administration in time range)       Assessments & Plan (with Medical Decision Making)   59-year-old male presenting with fatigue with recent anemia on clinic hemoglobin check from presumable GI source.  He is not having any hematemesis, melena, or other source for his bleeding.  He is guaiac positive on exam today.  Unfortunately, he continues to have significant alcohol use.  His blood work today shows worsening normocytic anemia with a hemoglobin of 5.8 from a presumed  GI source.  His alk phos is also significantly elevated and he has elevation in his bilirubin and INR likely from his chronic alcohol abuse.  His creatinine is at 2.94 which is up from his baseline of around 1.9; will give 1L LR to address this.  Given his ongoing bleeding and worsening anemia we would like to admit him to expedite his work-up.  We will transfuse 2 units of red blood cells here in the emergency department plan for admission. Also ordered IV Protonix.    3:03 PM spoke with Dr. Cabrera (sp?) of surgery who plan for EGD and colonoscopy tomorrow.  He will put him on the schedule.     I have reviewed the nursing notes.    I have reviewed the findings, diagnosis, plan and need for follow up with the patient.     Physician Attestation   I, Parrish Gastelum MD, personally examined and evaluated this patient.  I discussed the patient with the resident/fellow and care team, and agree with the assessment and plan of care as documented in the note of 2/27/20.      I personally reviewed vital signs, medications and labs.    Key findings: Patient is a 59-year-old male with a history of alcohol cirrhosis secondary to alcohol abuse, anemia and AML in remission who presents the emergency department complaining of low hemoglobin with need for transfusion and generalized fatigue.  Patient states he has been monitored for low hemoglobin and was seen by his primary care physician earlier this week had a hemoglobin drawn which was 6.1 and was referred to the ED for transfusion.    On my examination patient is alert and oriented he does have EtOH on his breath.  Vital signs are stable.  He appears slightly icteric.  Oral mucosa is slightly dry lungs are clear to auscultation bilaterally heart is regular rate and rhythm no murmur abdomen is soft nondistended without tenderness.  He has no significant peripheral edema and is alert and oriented x3.    Labs were obtained and hemoglobin was noted to be 5.8.  Rectal exam  revealed guaiac positive with brown stool there was no melena or bright red blood in vault per resident.  Patient was typed and crossed and 2 units of blood were ordered.  Patient's labs were also significant for a creatinine elevated at 2.94.  Patient's platelet count was decreased at 129 which is chronic.  INR was elevated 1.58.  Bilirubin was elevated at 1.4.  Alk phos was elevated to 56.  Patient has multiple antibodies on screening and this will take some time to get the patient blood.  We felt patient should be scoped and observe due to low blood count and positive guaiac findings.  Resident talked with Dr. Mclaughlin who is in agreement with scoping the patient tomorrow.  Patient was given IV fluids and IV Protonix.  Hospitalist service was contacted and they are agreement with admitting the patient for further evaluation and care.  Parrish Gastelum MD  Date of Service (when I saw the patient): 2/27/20  ED to Inpatient Handoff:    Discussed with Toshia Green at 3:50 PM   Patient accepted for Inpatient Stay  Pending studies include EtOH level  Code Status: Not Addressed           New Prescriptions    No medications on file       Final diagnoses:   Anemia due to blood loss, acute   Gastrointestinal hemorrhage, unspecified gastrointestinal hemorrhage type   Alcohol abuse       Glen Perez MD   2/27/2020   [facility] EMERGENCY DEPARTMENT     Glen Peerz MD  Resident  02/27/20 1808       Parrish Gastelum MD  02/29/20 [id number]

## 2020-02-27 NOTE — PROGRESS NOTES
"[facility] ADMISSION NOTE    Patient admitted to room 2404 at approximately 1715 via cart from emergency room. Patient was accompanied by transport tech.     Verbal SBAR report received from RN prior to patient arrival.     Patient ambulated to bed with stand-by assist. Patient alert and oriented X 3. The patient is not having any pain.  . Admission vital signs: Blood pressure 101/42, pulse 75, temperature 98.6  F (37  C), temperature source Oral, resp. rate 18, height 1.702 m (5' 7\"), weight 85.3 kg (188 lb), SpO2 100 %. Patient was oriented to plan of care, call light, bed controls, tv, telephone, bathroom and visiting hours.     Risk Assessment    The following safety risks were identified during admission: fall. Yellow risk band applied: YES.     Skin Initial Assessment    This writer admitted this patient and completed a full skin assessment and Kartik score in the Adult PCS flowsheet. Appropriate interventions initiated as needed.     Secondary skin check completed by Destini DIA.    Kartik Risk Assessment  Sensory Perception: 4-->no impairment  Moisture: 4-->rarely moist  Activity: 4-->walks frequently  Mobility: 3-->slightly limited  Nutrition: 3-->adequate  Friction and Shear: 3-->no apparent problem  Kartik Score: 21  Bed Support Surface: Atmos Air mattress    Education    Patient has a [place] to Observation order: No  Observation education completed and documented: Elva Wilcox RN    "

## 2020-02-27 NOTE — LETTER
Transition Communication Hand-off for Care Transitions to Next Level of Care Provider    Name: Abhijeet Mccauley  : 1960  MRN #: [id number]  Primary Care Provider: Chidi Valenzuela     Primary Clinic: [address] [place] [id number]     Reason for Hospitalization:  Alcohol abuse [F10.10]  Anemia due to blood loss, acute [D62]  Gastrointestinal hemorrhage, unspecified gastrointestinal hemorrhage type [K92.2]    Admit Date/Time: 2020  1:49 PM  Discharge Date: 2020; Left AMA    Payor Source: Payor: ARE / Plan: [employer] [place] ONLY / Product Type: HMO /     Readmission Assessment Measure (TALI) Risk Score/category:  LOW    Concern for non-adherence with plan of care:   Y/N   Yes, pt continues to use ETOH and smoke  Follow-up plan:    Future Appointments   Date Time Provider Department Center   3/2/2020  9:05 AM [facility]   3/2/2020  9:40 AM Chidi Valenzuela MD [facility]   3/10/2020  1:20 PM Chidi Valenzuela MD [facility]   3/11/2020  2:00 PM Ai Min, AuD [facility]   3/11/2020  2:30 PM Kenton Barron MD [facility]   3/18/2020  1:00 PM [facility]   3/31/2020  1:00 PM Brynn Gibson, PT [facility]     Go Recommendations:  Pt enrolled with [facility] services for ongoing care needs.  This writer was unable to do a complete assessment as pt was leaving AMA upon returning from being off the floor for hours.    RUDY Cummings    AVS/Discharge Summary is the source of truth; this is a helpful guide for improved communication of patient story

## 2020-02-27 NOTE — H&P
[facility]    History and Physical - Hospitalist Service       Date of Admission:  2/27/2020    Assessment & Plan   Abhijeet Mccauley is a 59 year old male admitted on 2/27/2020. He has history of alcohol cirrhosis, alcohol dependence with withdrawal, acute myeloid leukemia in remission and prior GI bleeds. He presents with blood in his stool and lightheadedness.    GI bleed with Acute/Symptomatic on Chronic Normocytic Anemia  2 months of intermittent bright red blood and melena. 1 week of lightheadedness/shortness of breath. Hemoglobin 5.8 (last 6.1 on 2/19/20). Recent baseline 6.1 - 7.6. Stool guaiac positive. Recently evaluated by GI who recommended EGD as well as colonoscopy with consideration of video capsule study if work up unremarkable.   EGD 1/2018 showed grade II esophageal varices which were banded at that time. EGD 2/2018 showed candidiasis esophagitis and gastric ulcers. Colonoscopy in 3/2018 showed multiple polyps and diverticulosis. Previous lab work in 2018 showed iron deficiency, likely element of chronic blood loss.  Appears to be GI bleed, unclear if upper, lower or both given BRBPR and melena reported. History of varices s/p banding and ulcers in the past. Differential is broad and includes variceal bleed, gastric/duodenal ulcer, esophagitis, gastritis vs lower etiology. Hemodynamically stable, appears to be a somewhat slow bleed with gradual downtrend of hemoglobin. Plan to transfuse 2 units of pRBC in ED. ED discussed with surgery who plans to do scope tomorrow, will start with EGD initially and do colonoscopy pending findings and improvement of GENEVIEVE.  - NPO  - IV pantoprazole Q12 hours  - surgery consult, plan for EGD tomorrow and colonoscopy if needed when GENEVIEVE improving  - check hemoglobin 2 hours following blood transfusion  - transfuse for hemoglobin < 7.0  - follow hemoglobin Q8 hours  - IVF: LR at 125 ml/hr   - advised patient to stop using alcohol and  "NSAIDs    Acute Kidney Injury on CKD  Creatinine 2.94. Baseline creatinine 1.9-2. Most likely etiology is pre-renal related to poor intake in past few days. May have component of intrinsic related to Bactrim use and NSAIDs.  - IVF: LR at 125 ml/hr  - Monitor I/O's, daily weights  - stop NSAIDS  - avoid nephrotoxic agents  - renally dose medications  - follow BMP  - SCAN bladder prn    Recently Diagnosed UTI  Prescribed Bactrim for UTI which was appropriate based on cultures. He has one more dose of Bactrim and is not longer feeling symptomatic.  - monitor for recurrence     Alcohol Dependence, history of withdrawal  Reports drinking 6 pack per day, cutting back in the past week trying to quit. Had some mild withdrawal symptoms at home. Last drank \"a few cocktails\" evening prior to admission. He does have history of withdrawal and seizure on chart. On admission no clear signs of withdrawal.  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   MELD-Na score: 24 at 2/27/2020  2:11 PM  MELD score: 23 at 2/27/2020  2:11 PM  Calculated from:  Serum Creatinine: 2.94 mg/dL at 2/27/2020  2:11 PM  Serum Sodium: 135 mmol/L at 2/27/2020  2:11 PM  Total Bilirubin: 1.4 mg/dL at 2/27/2020  2:11 PM  INR(ratio): 1.58 at 2/27/2020  2:11 PM  Age: 59 years  - continue follow up with GI  - encouraged alcohol abstinence, care transitions consult for resources  - AM CMP     Chronic Back Pain  - continue home gabapentin at reduced dose due to GENEVIEVE  - continue home Tyenol #3 prn    Acute Myeloid Leukemia, in remission  Last bone marrow biopsy in 7/2018 showed no evidence of recurrence,   - continue outpatient surveillance     Tobacco use with probable COPD: nicotine lozenge " "available. Encouraged cessation, patient working to quit.     Diet: NPO  DVT Prophylaxis: Low Risk/Ambulatory with no VTE prophylaxis indicated  Chong Catheter: not present  Code Status: Full code    Disposition Plan   Expected discharge: 2 - 3 days, recommended to prior living arrangement once hemoglobin stable, renal function improving and work up complete without barriers to discharge.  Entered: Breana Green PA-C 02/27/2020, 3:53 PM     The patient's care was discussed with the Attending Physician, Dr. Silviano Vargas and Patient.    Breana Green PA-C  [facility]  ______________________________________________________________________    Chief Complaint   GI bleeding with lightheadedness/shortness of breath    History is obtained from the patient and electronic health record    History of Present Illness   Abhijeet Mccauley is a 59 year old male who presents with GI bleeding.    He has been noticing both bright red blood and melena for the past 2 months or so. He reports noticing this weekly. He was evaluated by his PCP on 2/19/20. He hemoglobin was 6.1, he was advised to go the ED but refused opting for outpatient infusion. This was in the process of being arranged. He was evaluated by GI on 2/26/20 and was recommended to get EGD and colonoscopy, again recommended transfusion. He has not had any abdominal discomfort or heart burn. He has been taking ibuprofen daily, unsure how many times a day (\"whatever the bottle says\"). He also continues to drink though has been cutting back. Yesterday he had 3 cocktails. He had some mild withdrawal symptoms last week (cold sweats and tremors) because he had cut back significantly.    He presented to the ED today due to worsening lightheadedness and shortness of breath which has been progressing in the past week. He states he was feverish and had nausea with emesis a few days ago. He did not notice any blood or coffee ground appearing " emesis. His symptoms have since resolved.     He was also found to have a UTI on his clinic visit 2/19/20 and was prescribed Bactrim which was appropriate based on urine culture. He has one more day to finish his antibiotic course. His symptoms of dysuria have resolved.     He currently denies fever, chills, cough, congestion, sore throat, palpitations, chest pain, abdominal pain, nausea, vomiting, diarrhea, changes in urination.    Review of Systems    The 10 point Review of Systems is negative other than noted in the HPI or here.     Past Medical History    I have reviewed this patient's medical history and updated it with pertinent information if needed.   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)     s/p chemo, no bone marrow transplant     Chronic obstructive pulmonary disease 5/17/2018     COPD (chronic obstructive pulmonary disease) (H)      History of pulmonary embolus (PE)      Hypertension      PUD (peptic ulcer disease)      Tobacco dependence      Ulcerative colitis (H)        Past Surgical History   I have reviewed this patient's surgical history and updated it with pertinent information if needed.  Past Surgical History:   Procedure Laterality Date     AS TOTAL KNEE ARTHROPLASTY Left      BONE MARROW BIOPSY, BONE SPECIMEN, NEEDLE/TROCAR N/A 6/12/2018    Procedure: BIOPSY BONE MARROW;  Bone Marrow Biopsy;  Surgeon: Demar Sapp MD;  Location: [facility]     ESOPHAGOSCOPY, GASTROSCOPY, DUODENOSCOPY (EGD), COMBINED N/A 2/8/2018    Procedure: COMBINED ESOPHAGOSCOPY, GASTROSCOPY, DUODENOSCOPY (EGD), BIOPSY SINGLE OR MULTIPLE;  gastroscopy with biopsies;  Surgeon: Raz Cobb MD;  Location: [facility]     ESOPHAGOSCOPY, GASTROSCOPY, DUODENOSCOPY (EGD), COMBINED N/A 3/18/2018    Procedure: COMBINED ESOPHAGOSCOPY, GASTROSCOPY, DUODENOSCOPY (EGD);;   Surgeon: Raz Cobb MD;  Location: [facility]     LACERATION REPAIR Right     Right leg     PHACOEMULSIFICATION WITH STANDARD INTRAOCULAR LENS IMPLANT Left 7/1/2019    Procedure: cataract removal with implant.;  Surgeon: Adrian Oliveira MD;  Location: [facility]     PHACOEMULSIFICATION WITH STANDARD INTRAOCULAR LENS IMPLANT Right 7/29/2019    Procedure: Cataract removal with implant.;  Surgeon: Adrian Oliveira MD;  Location: [facility]       Social History   I have reviewed this patient's social history and updated it with pertinent information if needed.  He lives in [place] with his sister. He is cutting back on both smoking and drinking. He hopes to quit. He has never attended any sort of programming for alcohol use.  Social History     Tobacco Use     Smoking status: Current Every Day Smoker     Packs/day: 0.50     Years: 30.00     Pack years: 15.00     Types: Cigarettes     Smokeless tobacco: Never Used     Tobacco comment: 5 cigarettes a day    Substance Use Topics     Alcohol use: Yes     Comment: Down to 3 beers per day.  Sometimes a cocktail also.     Drug use: Yes     Types: Marijuana     Family History   I have reviewed this patient's family history and updated it with pertinent information if needed.   Family History   Problem Relation Age of Onset     Hypertension Mother      Coronary Artery Disease Father         MI     Prior to Admission Medications   Prior to Admission Medications   Prescriptions Last Dose Informant Patient Reported? Taking?   POTASSIUM CHLORIDE PO 2/27/2020 at 0700  Yes Yes   Sig: Take 10 mEq by mouth every morning    acetaminophen 500 MG PO tablet Past Week at Unknown time  No Yes   Sig: Take 1-2 tablets (500-1,000 mg) by mouth every 6 hours as needed for mild pain Do not take more than 3000mg acetaminophen total in one day.   acetaminophen-codeine 300-30 MG PO tablet 2/26/2020 at 2200  No Yes   Sig: Take 1-2 tablets by mouth daily as needed for severe pain   albuterol  (VENTOLIN HFA) 108 (90 Base) MCG/ACT inhaler More than a month at Unknown time  No No   Sig: Inhale 2 puffs into the lungs every 4 hours as needed for shortness of breath / dyspnea or wheezing   atorvastatin 20 MG PO tablet 2/27/2020 at 0700  No Yes   Sig: Take 1 tablet (20 mg) by mouth daily   baclofen (LIORESAL) 10 MG tablet 2/27/2020 at 0700  No Yes   Sig: TAKE 1/2 TO 1 TABLET BY MOUTH UP TO THREE TIMES A DAY   Patient taking differently: Take 10 mg by mouth 3 times daily    benzocaine (ORABASE) 20 % PSTE paste   No No   Sig: Take by mouth 4 times daily as needed for other (mouth pain)   buPROPion (WELLBUTRIN SR) 150 MG 12 hr tablet 2/27/2020 at 0700  No Yes   Sig: Take once per day for 3 days and then increase to twice per day   Patient taking differently: Take 150 mg by mouth 2 times daily    desonide (DESOWEN) 0.05 % external lotion   No No   Sig: APPLY TOPICALLY AS NEEDED   furosemide (LASIX) 20 MG tablet 2/27/2020 at 44716  No Yes   Sig: Take 1 tablet (20 mg) by mouth every morning   gabapentin (NEURONTIN) 300 MG capsule 2/27/2020 at 0700  No Yes   Sig: Take 2 capsules (600 mg) by mouth 3 times daily   loratadine (CLARITIN) 10 MG tablet 2/27/2020 at 0700  No Yes   Sig: Take 1 tablet (10 mg) by mouth daily as needed for allergies   Patient taking differently: Take 10 mg by mouth daily    mometasone-formoterol (DULERA) 200-5 MCG/ACT oral inhaler 2/27/2020 at 0700 Self Yes Yes   Sig: Inhale 2 puffs into the lungs 2 times daily as needed    montelukast (SINGULAIR) 10 MG tablet 2/26/2020 at 2200  No Yes   Sig: Take 1 tablet (10 mg) by mouth At Bedtime   nicotine 2 MG MT lozenge NOT STARTED  No No   Sig: Place 1 lozenge (2 mg) inside cheek every hour as needed for smoking cessation   order for DME  Self No No   Sig: Equipment being ordered: shower chair   order for DME   No No   Sig: Equipment being ordered: bed pull up bar   order for DME   No No   Sig: Equipment being ordered: Compression Stockings TWO (2)  "Pairs, 15-20 mm Hg.   order for DME   No No   Sig: Equipment being ordered: 2 pairs thigh high compression stockings, strength per patient preference   order for DME 2/27/2020 at Unknown time  No Yes   Sig: Equipment being ordered: 4 wheel walker   pantoprazole (PROTONIX) 40 MG EC tablet 2/27/2020 at 0700  No Yes   Sig: Take 1 tablet (40 mg) by mouth 2 times daily   propranolol (INDERAL) 20 MG tablet 2/27/2020 at 0700  No Yes   Sig: Take 1 tablet (20 mg) by mouth 2 times daily   rifaximin (XIFAXAN) 550 MG TABS tablet Unknown at Unknown time  No No   Sig: Take 1 tablet (550 mg) by mouth 2 times daily   spironolactone (ALDACTONE) 50 MG tablet 2/27/2020 at 0700  Yes Yes   Sig: Take 50 mg by mouth daily   tiotropium (SPIRIVA) 18 MCG capsule Past Month at Unknown time Self Yes Yes   Sig: Inhale 1 capsule into the lungs daily Using PRN   traZODone (DESYREL) 50 MG tablet 2/26/2020 at 2200  No Yes   Sig: Take 1 tablet (50 mg) by mouth At Bedtime      Facility-Administered Medications: None     Allergies   Allergies   Allergen Reactions     Blood Transfusion Related (Informational Only)      Patient has a history of a clinically significant antibody against RBC antigens.  A delay in compatible RBCs may occur.     Famotidine Unknown and Other (See Comments)     Severe abdominal cramps     Cyclobenzaprine Hives     Methocarbamol Other (See Comments)     Made pt's \"face break out\"     Pepcid Cramps     Severe abdominal cramps     Vancomycin Other (See Comments)     hallucinations     Vfend Other (See Comments)     IV - cold sweats, Iron tablet makes him nauseated and stomach ached     Hydrocodone-Acetaminophen Rash       Physical Exam   Vital Signs: Temp: 98.1  F (36.7  C) Temp src: Oral BP: (!) 144/65 Pulse: 78     SpO2: 97 % O2 Device: None (Room air)    Weight: 188 lbs 0 oz    Constitutional: sitting up comfortably in bed, awake, alert, cooperative, no apparent distress, and appears stated age  Eyes: Lids and lashes normal, " pupils equal, round and reactive to light, extra ocular muscles intact, sclera clear, conjunctiva normal  ENT: Normocephalic, without obvious abnormality, atraumatic, oral pharynx with moist mucous membranes.  Hematologic / Lymphatic: no cervical lymphadenopathy and no supraclavicular lymphadenopathy  Respiratory: No increased work of breathing, good air exchange, few wheezes appreciated otherwise clear to auscultation bilaterally, no crackles or rhonchi  Cardiovascular: regular rate and rhythm, and no murmur noted. Trace bilateral lower extremity edema.  GI: normal bowel sounds, soft, non-distended, non-tender  Genitounirinary: deferred  Skin: normal skin color, texture, turgor  Musculoskeletal: normal bulk and tone. Moves all 4 extremities appropriately.  Neurologic: Awake, alert, oriented to name, place and time.  Cranial nerves II-XII are grossly intact.   Neuropsychiatric: calm, pleasant, cooperative, appropriate thought process/content    Data   Data reviewed today: I reviewed all medications, new labs and imaging results over the last 24 hours. I personally reviewed no images or EKG's today.    Recent Labs   Lab 02/27/20  1411   WBC 10.1   HGB 5.8*   MCV 83   *   INR 1.58*      POTASSIUM 4.6   CHLORIDE 105   CO2 17*   BUN 28   CR 2.94*   ANIONGAP 13   BEE 8.9   *   ALBUMIN 3.6   PROTTOTAL 7.4   BILITOTAL 1.4*   ALKPHOS 256*   ALT 26   AST 36     No results found for this or any previous visit (from the past 24 hour(s)).

## 2020-02-27 NOTE — ED NOTES
DATE:  2/27/2020   TIME OF RECEIPT FROM LAB:  1441  LAB TEST:  Hgb  LAB VALUE:  5.8  RESULTS GIVEN WITH READ-BACK TO (PROVIDER):  Durgin  TIME LAB VALUE REPORTED TO PROVIDER:   1445

## 2020-02-28 ENCOUNTER — ANESTHESIA EVENT (OUTPATIENT)
Dept: GASTROENTEROLOGY | Facility: CLINIC | Age: 60
End: 2020-02-28
Payer: COMMERCIAL

## 2020-02-28 ENCOUNTER — ANESTHESIA (OUTPATIENT)
Dept: GASTROENTEROLOGY | Facility: CLINIC | Age: 60
End: 2020-02-28
Payer: COMMERCIAL

## 2020-02-28 VITALS
HEART RATE: 67 BPM | RESPIRATION RATE: 16 BRPM | HEIGHT: 67 IN | TEMPERATURE: 98.2 F | OXYGEN SATURATION: 97 % | BODY MASS INDEX: 29.48 KG/M2 | WEIGHT: 187.83 LBS | DIASTOLIC BLOOD PRESSURE: 43 MMHG | SYSTOLIC BLOOD PRESSURE: 122 MMHG

## 2020-02-28 LAB
ALBUMIN SERPL-MCNC: 3.2 G/DL (ref 3.4–5)
ALP SERPL-CCNC: 225 U/L (ref 40–150)
ALT SERPL W P-5'-P-CCNC: 21 U/L (ref 0–70)
ANION GAP SERPL CALCULATED.3IONS-SCNC: 7 MMOL/L (ref 3–14)
AST SERPL W P-5'-P-CCNC: 33 U/L (ref 0–45)
BASOPHILS # BLD AUTO: 0.1 10E9/L (ref 0–0.2)
BASOPHILS NFR BLD AUTO: 0.8 %
BILIRUB SERPL-MCNC: 2 MG/DL (ref 0.2–1.3)
BUN SERPL-MCNC: 28 MG/DL (ref 7–30)
CALCIUM SERPL-MCNC: 8.7 MG/DL (ref 8.5–10.1)
CHLORIDE SERPL-SCNC: 109 MMOL/L (ref 94–109)
CO2 SERPL-SCNC: 22 MMOL/L (ref 20–32)
CREAT SERPL-MCNC: 2.84 MG/DL (ref 0.66–1.25)
DIFFERENTIAL METHOD BLD: ABNORMAL
EOSINOPHIL # BLD AUTO: 0.3 10E9/L (ref 0–0.7)
EOSINOPHIL NFR BLD AUTO: 3.8 %
ERYTHROCYTE [DISTWIDTH] IN BLOOD BY AUTOMATED COUNT: 18.2 % (ref 10–15)
GFR SERPL CREATININE-BSD FRML MDRD: 23 ML/MIN/{1.73_M2}
GLUCOSE SERPL-MCNC: 68 MG/DL (ref 70–99)
HCT VFR BLD AUTO: 24.2 % (ref 40–53)
HGB BLD-MCNC: 7.3 G/DL (ref 13.3–17.7)
HGB BLD-MCNC: 7.8 G/DL (ref 13.3–17.7)
IMM GRANULOCYTES # BLD: 0 10E9/L (ref 0–0.4)
IMM GRANULOCYTES NFR BLD: 0.3 %
LYMPHOCYTES # BLD AUTO: 1.1 10E9/L (ref 0.8–5.3)
LYMPHOCYTES NFR BLD AUTO: 15.3 %
MCH RBC QN AUTO: 25.3 PG (ref 26.5–33)
MCHC RBC AUTO-ENTMCNC: 30.2 G/DL (ref 31.5–36.5)
MCV RBC AUTO: 84 FL (ref 78–100)
MONOCYTES # BLD AUTO: 1.1 10E9/L (ref 0–1.3)
MONOCYTES NFR BLD AUTO: 14.5 %
NEUTROPHILS # BLD AUTO: 4.8 10E9/L (ref 1.6–8.3)
NEUTROPHILS NFR BLD AUTO: 65.3 %
NRBC # BLD AUTO: 0 10*3/UL
NRBC BLD AUTO-RTO: 0 /100
PLATELET # BLD AUTO: 90 10E9/L (ref 150–450)
POTASSIUM SERPL-SCNC: 4 MMOL/L (ref 3.4–5.3)
PROT SERPL-MCNC: 6.7 G/DL (ref 6.8–8.8)
RBC # BLD AUTO: 2.89 10E12/L (ref 4.4–5.9)
SODIUM SERPL-SCNC: 138 MMOL/L (ref 133–144)
UPPER GI ENDOSCOPY: NORMAL
WBC # BLD AUTO: 7.3 10E9/L (ref 4–11)

## 2020-02-28 PROCEDURE — 80053 COMPREHEN METABOLIC PANEL: CPT | Performed by: EMERGENCY MEDICINE

## 2020-02-28 PROCEDURE — C9113 INJ PANTOPRAZOLE SODIUM, VIA: HCPCS | Performed by: PHYSICIAN ASSISTANT

## 2020-02-28 PROCEDURE — 25800030 ZZH RX IP 258 OP 636: Performed by: PHYSICIAN ASSISTANT

## 2020-02-28 PROCEDURE — 36415 COLL VENOUS BLD VENIPUNCTURE: CPT | Performed by: EMERGENCY MEDICINE

## 2020-02-28 PROCEDURE — 25000125 ZZHC RX 250: Performed by: NURSE ANESTHETIST, CERTIFIED REGISTERED

## 2020-02-28 PROCEDURE — 36415 COLL VENOUS BLD VENIPUNCTURE: CPT | Performed by: PHYSICIAN ASSISTANT

## 2020-02-28 PROCEDURE — 25000125 ZZHC RX 250: Performed by: EMERGENCY MEDICINE

## 2020-02-28 PROCEDURE — 25000132 ZZH RX MED GY IP 250 OP 250 PS 637: Performed by: SURGERY

## 2020-02-28 PROCEDURE — 25000128 H RX IP 250 OP 636: Performed by: EMERGENCY MEDICINE

## 2020-02-28 PROCEDURE — 25000128 H RX IP 250 OP 636: Performed by: PHYSICIAN ASSISTANT

## 2020-02-28 PROCEDURE — 25800030 ZZH RX IP 258 OP 636: Performed by: EMERGENCY MEDICINE

## 2020-02-28 PROCEDURE — 25000128 H RX IP 250 OP 636: Performed by: NURSE ANESTHETIST, CERTIFIED REGISTERED

## 2020-02-28 PROCEDURE — 0DB78ZX EXCISION OF STOMACH, PYLORUS, VIA NATURAL OR ARTIFICIAL OPENING ENDOSCOPIC, DIAGNOSTIC: ICD-10-PCS | Performed by: SURGERY

## 2020-02-28 PROCEDURE — 88305 TISSUE EXAM BY PATHOLOGIST: CPT | Performed by: SURGERY

## 2020-02-28 PROCEDURE — 85018 HEMOGLOBIN: CPT | Performed by: PHYSICIAN ASSISTANT

## 2020-02-28 PROCEDURE — 99239 HOSP IP/OBS DSCHRG MGMT >30: CPT | Performed by: FAMILY MEDICINE

## 2020-02-28 PROCEDURE — 43239 EGD BIOPSY SINGLE/MULTIPLE: CPT | Performed by: SURGERY

## 2020-02-28 PROCEDURE — 0DB98ZX EXCISION OF DUODENUM, VIA NATURAL OR ARTIFICIAL OPENING ENDOSCOPIC, DIAGNOSTIC: ICD-10-PCS | Performed by: SURGERY

## 2020-02-28 PROCEDURE — 85025 COMPLETE CBC W/AUTO DIFF WBC: CPT | Performed by: EMERGENCY MEDICINE

## 2020-02-28 PROCEDURE — 88305 TISSUE EXAM BY PATHOLOGIST: CPT | Mod: 26,59 | Performed by: SURGERY

## 2020-02-28 PROCEDURE — 37000008 ZZH ANESTHESIA TECHNICAL FEE, 1ST 30 MIN: Performed by: SURGERY

## 2020-02-28 RX ORDER — PROPOFOL 10 MG/ML
INJECTION, EMULSION INTRAVENOUS PRN
Status: DISCONTINUED | OUTPATIENT
Start: 2020-02-28 | End: 2020-02-28

## 2020-02-28 RX ORDER — GLYCOPYRROLATE 0.2 MG/ML
INJECTION, SOLUTION INTRAMUSCULAR; INTRAVENOUS PRN
Status: DISCONTINUED | OUTPATIENT
Start: 2020-02-28 | End: 2020-02-28

## 2020-02-28 RX ORDER — LIDOCAINE HYDROCHLORIDE 10 MG/ML
INJECTION, SOLUTION INFILTRATION; PERINEURAL PRN
Status: DISCONTINUED | OUTPATIENT
Start: 2020-02-28 | End: 2020-02-28

## 2020-02-28 RX ORDER — PROPOFOL 10 MG/ML
INJECTION, EMULSION INTRAVENOUS CONTINUOUS PRN
Status: DISCONTINUED | OUTPATIENT
Start: 2020-02-28 | End: 2020-02-28

## 2020-02-28 RX ADMIN — THIAMINE HCL TAB 100 MG 100 MG: 100 TAB at 13:47

## 2020-02-28 RX ADMIN — GABAPENTIN 300 MG: 300 CAPSULE ORAL at 13:46

## 2020-02-28 RX ADMIN — TOPICAL ANESTHETIC 1 SPRAY: 200 SPRAY DENTAL; PERIODONTAL at 12:04

## 2020-02-28 RX ADMIN — FOLIC ACID: 5 INJECTION, SOLUTION INTRAMUSCULAR; INTRAVENOUS; SUBCUTANEOUS at 00:42

## 2020-02-28 RX ADMIN — PROPOFOL 200 MCG/KG/MIN: 10 INJECTION, EMULSION INTRAVENOUS at 12:04

## 2020-02-28 RX ADMIN — FOLIC ACID 1 MG: 1 TABLET ORAL at 13:47

## 2020-02-28 RX ADMIN — PROPOFOL 50 MG: 10 INJECTION, EMULSION INTRAVENOUS at 12:04

## 2020-02-28 RX ADMIN — LIDOCAINE HYDROCHLORIDE 50 MG: 10 INJECTION, SOLUTION INFILTRATION; PERINEURAL at 12:04

## 2020-02-28 RX ADMIN — SODIUM CHLORIDE, POTASSIUM CHLORIDE, SODIUM LACTATE AND CALCIUM CHLORIDE: 600; 310; 30; 20 INJECTION, SOLUTION INTRAVENOUS at 10:56

## 2020-02-28 RX ADMIN — BACLOFEN 10 MG: 10 TABLET ORAL at 13:47

## 2020-02-28 RX ADMIN — BUPROPION HYDROCHLORIDE 150 MG: 150 TABLET, FILM COATED, EXTENDED RELEASE ORAL at 13:47

## 2020-02-28 RX ADMIN — PANTOPRAZOLE SODIUM 40 MG: 40 INJECTION, POWDER, LYOPHILIZED, FOR SOLUTION INTRAVENOUS at 06:30

## 2020-02-28 RX ADMIN — GLYCOPYRROLATE 0.3 MG: 0.2 INJECTION, SOLUTION INTRAMUSCULAR; INTRAVENOUS at 12:04

## 2020-02-28 RX ADMIN — ATORVASTATIN CALCIUM 20 MG: 20 TABLET, FILM COATED ORAL at 13:47

## 2020-02-28 RX ADMIN — MULTIPLE VITAMINS W/ MINERALS TAB 1 TABLET: TAB at 13:46

## 2020-02-28 RX ADMIN — SODIUM CHLORIDE, POTASSIUM CHLORIDE, SODIUM LACTATE AND CALCIUM CHLORIDE: 600; 310; 30; 20 INJECTION, SOLUTION INTRAVENOUS at 12:03

## 2020-02-28 ASSESSMENT — LIFESTYLE VARIABLES: TOBACCO_USE: 1

## 2020-02-28 ASSESSMENT — ACTIVITIES OF DAILY LIVING (ADL)
ADLS_ACUITY_SCORE: 16

## 2020-02-28 ASSESSMENT — COPD QUESTIONNAIRES: COPD: 1

## 2020-02-28 ASSESSMENT — MIFFLIN-ST. JEOR: SCORE: 1615.63

## 2020-02-28 NOTE — ANESTHESIA CARE TRANSFER NOTE
Patient: Abhijeet Mccauley    Procedure(s):  ESOPHAGOGASTRODUODENOSCOPY, WITH BIOPSY    Diagnosis: Anemia, unspecified type [D64.9]  Diagnosis Additional Information: No value filed.    Anesthesia Type:   MAC     Note:  Airway :Nasal Cannula  Patient transferred to:Phase II  Handoff Report: Identifed the Patient, Identified the Reponsible Provider, Reviewed the pertinent medical history, Discussed the surgical course, Reviewed Intra-OP anesthesia mangement and issues during anesthesia, Set expectations for post-procedure period and Allowed opportunity for questions and acknowledgement of understanding      Vitals: (Last set prior to Anesthesia Care Transfer)    CRNA VITALS  2/28/2020 1142 - 2/28/2020 1213      2/28/2020             Pulse:  76    SpO2:  (!) 87 %                Electronically Signed By: Edinson Mays CRNA, APRN [id number]  February 28, 2020  12:13 PM

## 2020-02-28 NOTE — PROGRESS NOTES
Patient is tolerating receiving blood. Patient is on 2nd unit without complaint. Patient is alert and oriented.ciwa is 0.patient is up to bathroom with stand by assist voiding good amounts/patient uses his call light and can let his needs be known.pt is npo. Patient has call light within reach. Patient has no other complaints all questions answered.

## 2020-02-28 NOTE — ANESTHESIA PREPROCEDURE EVALUATION
Anesthesia Pre-Procedure Evaluation    Patient: Abhijeet Mccauley   MRN: [id number] : 1960          Preoperative Diagnosis: Anemia, unspecified type [D64.9]    Procedure(s):  COLONOSCOPY  ESOPHAGOGASTRODUODENOSCOPY (EGD)    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)     s/p chemo, no bone marrow transplant     Chronic obstructive pulmonary disease 2018     COPD (chronic obstructive pulmonary disease) (H)      History of pulmonary embolus (PE)      Hypertension      PUD (peptic ulcer disease)      Tobacco dependence      Ulcerative colitis (H)      Past Surgical History:   Procedure Laterality Date     AS TOTAL KNEE ARTHROPLASTY Left      BONE MARROW BIOPSY, BONE SPECIMEN, NEEDLE/TROCAR N/A 2018    Procedure: BIOPSY BONE MARROW;  Bone Marrow Biopsy;  Surgeon: Demar Sapp MD;  Location: [facility]     ESOPHAGOSCOPY, GASTROSCOPY, DUODENOSCOPY (EGD), COMBINED N/A 2018    Procedure: COMBINED ESOPHAGOSCOPY, GASTROSCOPY, DUODENOSCOPY (EGD), BIOPSY SINGLE OR MULTIPLE;  gastroscopy with biopsies;  Surgeon: Raz Cobb MD;  Location: [facility]     ESOPHAGOSCOPY, GASTROSCOPY, DUODENOSCOPY (EGD), COMBINED N/A 3/18/2018    Procedure: COMBINED ESOPHAGOSCOPY, GASTROSCOPY, DUODENOSCOPY (EGD);;  Surgeon: Raz Cobb MD;  Location: [facility]     LACERATION REPAIR Right     Right leg     PHACOEMULSIFICATION WITH STANDARD INTRAOCULAR LENS IMPLANT Left 2019    Procedure: cataract removal with implant.;  Surgeon: Adrian Oliveira MD;  Location: [facility]     PHACOEMULSIFICATION WITH STANDARD INTRAOCULAR LENS IMPLANT Right 2019    Procedure: Cataract removal with implant.;  Surgeon: Adrian Oliveira MD;  Location: [facility]       Anesthesia Evaluation     . Pt has had prior anesthetic.            ROS/MED HX    ENT/Pulmonary:     (+)tobacco  "use, Current use 2 packs/day  asthma COPD, , recent URI . .    Neurologic:       Cardiovascular:     (+) hypertension----. : . . . :. .       METS/Exercise Tolerance:     Hematologic:         Musculoskeletal:         GI/Hepatic:     (+) liver disease, Other GI/Hepatic ETOH      Renal/Genitourinary:         Endo:         Psychiatric:         Infectious Disease:         Malignancy:   (+) Malignancy History of Lymphoma/Leukemia          Other:                          Physical Exam  Normal systems: cardiovascular, pulmonary and dental    Airway   Mallampati: II  TM distance: >3 FB  Neck ROM: full    Dental     Cardiovascular       Pulmonary             Lab Results   Component Value Date    WBC 7.3 02/28/2020    HGB 7.3 (L) 02/28/2020    HCT 24.2 (L) 02/28/2020    PLT 90 (L) 02/28/2020    CRP 33.8 (H) 03/15/2019    SED 73 (H) 03/15/2019     02/28/2020    POTASSIUM 4.0 02/28/2020    CHLORIDE 109 02/28/2020    CO2 22 02/28/2020    BUN 28 02/28/2020    CR 2.84 (H) 02/28/2020    GLC 68 (L) 02/28/2020    BEE 8.7 02/28/2020    PHOS 5.1 (H) 12/22/2008    MAG 2.0 03/05/2019    ALBUMIN 3.2 (L) 02/28/2020    PROTTOTAL 6.7 (L) 02/28/2020    ALT 21 02/28/2020    AST 33 02/28/2020    ALKPHOS 225 (H) 02/28/2020    BILITOTAL 2.0 (H) 02/28/2020    LIPASE 314 03/05/2019    STEW 34 05/31/2019    PTT 36 03/09/2019    INR 1.58 (H) 02/27/2020    FIBR 323 12/10/2014    TSH 0.94 12/05/2018       Preop Vitals  BP Readings from Last 3 Encounters:   02/28/20 (!) 113/34   02/26/20 115/54   02/19/20 102/50    Pulse Readings from Last 3 Encounters:   02/28/20 67   02/26/20 51   02/19/20 72      Resp Readings from Last 3 Encounters:   02/28/20 16   01/10/20 14   12/05/19 18    SpO2 Readings from Last 3 Encounters:   02/28/20 96%   02/26/20 98%   02/19/20 98%      Temp Readings from Last 1 Encounters:   02/28/20 36.9  C (98.5  F) (Oral)    Ht Readings from Last 1 Encounters:   02/27/20 1.702 m (5' 7\")      Wt Readings from Last 1 Encounters: " "  02/28/20 85.2 kg (187 lb 13.3 oz)    Estimated body mass index is 29.42 kg/m  as calculated from the following:    Height as of this encounter: 1.702 m (5' 7\").    Weight as of this encounter: 85.2 kg (187 lb 13.3 oz).       Anesthesia Plan      History & Physical Review  History and physical reviewed and following examination; no interval change.    ASA Status:  3 .    NPO Status:  > 6 hours    Plan for MAC Reason for MAC:  Deep or markedly invasive procedure (G8)         Postoperative Care      Consents  Anesthetic plan, risks, benefits and alternatives discussed with:  Patient..                 Edinson Mays CRNA, APRN CRNA  "

## 2020-02-28 NOTE — PLAN OF CARE
Patient left AMA. All personal items sent home with Patient, including medications patient brought in with him. Has appointment to follow up with primary MD and to have lab work drawn on Monday 3/2.  spoke with Patient prior to his leaving.

## 2020-02-28 NOTE — PLAN OF CARE
Patient returned from EGD. Had regular lunch and now states that he wants to go home.  Paged with update. Now says that he will go AMA. Talked with MD regarding Patients staement. Dr wants Patient to stay overnight to monitor Patients Hgb. Talked with Patient regarding what MD said and Patient states that he still wants to go. Will sign AMA paperwork. Call friend for ride home.

## 2020-02-28 NOTE — PLAN OF CARE
Alert and oriented. Denies pain or nausea. CIWA score 0.  Up with assist of one to bathroom.   IV infusing.   Npo after midnight.   Bed alarm on for safety.

## 2020-02-28 NOTE — CONSULTS
59 year old year old male here for upper endoscopy for blood loss anemia.  Patient has ongoing GI loss.  Has a history of ETOH abuse and cirrhosis.  He presents with weakness and received transfusion in ED and floor.  He also had GENEVIEVE which did not allow bowel preparation overnight.  He intermittently has bright red blood per rectum as well.  Last colonoscopy was 2 years ago, last upper endoscopy was 2 years ago.  He has been seeing GI.     He admits to significant ETOH use, has history of cirrhosis.  He knows he needs to quit and is making attempts to.        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)     CKD (chronic kidney disease) stage 3, GFR 30-59 ml/min (H)     Rosacea     Mild intermittent asthma without complication     Peptic ulcer disease     Alcohol dependence (H)     Tobacco dependence syndrome     Tubular adenoma of colon     Normocytic anemia     Leukocytosis with increased monocytes     Generalized weakness     AIDP (acute inflammatory demyelinating polyneuropathy) (H)     Primary osteoarthritis of left knee     Intermediate risk for ASCVD (arteriosclerotic cardiovascular disease)     Esophageal varices (H)     Chronic midline low back pain with bilateral sciatica     GI bleed       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)     s/p chemo, no bone marrow transplant     Chronic obstructive pulmonary disease 5/17/2018     COPD (chronic obstructive pulmonary disease) (H)      History of pulmonary embolus (PE)      Hypertension      PUD (peptic ulcer disease)      Tobacco dependence       "Ulcerative colitis (H)        Past Surgical History:   Procedure Laterality Date     AS TOTAL KNEE ARTHROPLASTY Left      BONE MARROW BIOPSY, BONE SPECIMEN, NEEDLE/TROCAR N/A 6/12/2018    Procedure: BIOPSY BONE MARROW;  Bone Marrow Biopsy;  Surgeon: Demar Sapp MD;  Location: [facility]     ESOPHAGOSCOPY, GASTROSCOPY, DUODENOSCOPY (EGD), COMBINED N/A 2/8/2018    Procedure: COMBINED ESOPHAGOSCOPY, GASTROSCOPY, DUODENOSCOPY (EGD), BIOPSY SINGLE OR MULTIPLE;  gastroscopy with biopsies;  Surgeon: Raz Cobb MD;  Location: [facility]     ESOPHAGOSCOPY, GASTROSCOPY, DUODENOSCOPY (EGD), COMBINED N/A 3/18/2018    Procedure: COMBINED ESOPHAGOSCOPY, GASTROSCOPY, DUODENOSCOPY (EGD);;  Surgeon: Raz Cobb MD;  Location: [facility]     LACERATION REPAIR Right     Right leg     PHACOEMULSIFICATION WITH STANDARD INTRAOCULAR LENS IMPLANT Left 7/1/2019    Procedure: cataract removal with implant.;  Surgeon: Adrian Oliveira MD;  Location: [facility]     PHACOEMULSIFICATION WITH STANDARD INTRAOCULAR LENS IMPLANT Right 7/29/2019    Procedure: Cataract removal with implant.;  Surgeon: Adrian Oliveira MD;  Location: [facility]       Family History   Problem Relation Age of Onset     Hypertension Mother      Coronary Artery Disease Father         MI       No current outpatient medications on file.       Allergies   Allergen Reactions     Blood Transfusion Related (Informational Only)      Patient has a history of a clinically significant antibody against RBC antigens.  A delay in compatible RBCs may occur.     Famotidine Unknown and Other (See Comments)     Severe abdominal cramps     Cyclobenzaprine Hives     Methocarbamol Other (See Comments)     Made pt's \"face break out\"     Pepcid Cramps     Severe abdominal cramps     Vancomycin Other (See Comments)     hallucinations     Vfend Other (See Comments)     IV - cold sweats, Iron tablet makes him nauseated and stomach ached     Hydrocodone-Acetaminophen Rash       Pt " reports that he has been smoking cigarettes. He has a 15.00 pack-year smoking history. He has never used smokeless tobacco. He reports current alcohol use. He reports current drug use. Drug: Marijuana.    Exam:    Awake, Alert OX3  Lungs - CTA bilaterally  CV - RRR, no murmurs, distal pulses intact  Abd - soft, non-distended, non-tender, +BS  Extr - No cyanosis or edema    Lab Results   Component Value Date    WBC 7.3 02/28/2020     Lab Results   Component Value Date    RBC 2.89 02/28/2020     Lab Results   Component Value Date    HGB 7.3 02/28/2020     Lab Results   Component Value Date    HCT 24.2 02/28/2020     Lab Results   Component Value Date    MCV 84 02/28/2020     Lab Results   Component Value Date    MCH 25.3 02/28/2020     Lab Results   Component Value Date    MCHC 30.2 02/28/2020     Lab Results   Component Value Date    RDW 18.2 02/28/2020     Lab Results   Component Value Date    PLT 90 02/28/2020     Last Comprehensive Metabolic Panel:  Sodium   Date Value Ref Range Status   02/28/2020 138 133 - 144 mmol/L Final     Potassium   Date Value Ref Range Status   02/28/2020 4.0 3.4 - 5.3 mmol/L Final     Chloride   Date Value Ref Range Status   02/28/2020 109 94 - 109 mmol/L Final     Carbon Dioxide   Date Value Ref Range Status   02/28/2020 22 20 - 32 mmol/L Final     Anion Gap   Date Value Ref Range Status   02/28/2020 7 3 - 14 mmol/L Final     Glucose   Date Value Ref Range Status   02/28/2020 68 (L) 70 - 99 mg/dL Final     Urea Nitrogen   Date Value Ref Range Status   02/28/2020 28 7 - 30 mg/dL Final     Creatinine   Date Value Ref Range Status   02/28/2020 2.84 (H) 0.66 - 1.25 mg/dL Final     GFR Estimate   Date Value Ref Range Status   02/28/2020 23 (L) >60 mL/min/[1.73_m2] Final     Comment:     Non  GFR Calc  Starting 12/18/2018, serum creatinine based estimated GFR (eGFR) will be   calculated using the Chronic Kidney Disease Epidemiology Collaboration   (CKD-EPI) equation.        Calcium   Date Value Ref Range Status   02/28/2020 8.7 8.5 - 10.1 mg/dL Final     Bilirubin Total   Date Value Ref Range Status   02/28/2020 2.0 (H) 0.2 - 1.3 mg/dL Final     Alkaline Phosphatase   Date Value Ref Range Status   02/28/2020 225 (H) 40 - 150 U/L Final     ALT   Date Value Ref Range Status   02/28/2020 21 0 - 70 U/L Final     AST   Date Value Ref Range Status   02/28/2020 33 0 - 45 U/L Final       A/P 59 year old year old male in need of upper endoscopy for blood loss anemia. Risks, benefits, alternatives, and complications were discussed including the possibility of perforation and the patient agreed to proceed.    Chente Mclaughlin, DO on 2/28/2020 at 11:26 AM

## 2020-02-28 NOTE — DISCHARGE SUMMARY
[facility] Discharge Summary    Abhijeet Mccauley MRN# [id number]   Age: 59 year old YOB: 1960     Date of Admission:  2/27/2020  Date of Discharge::  2/28/2020  Admitting Physician:  Silviano Vargas MD  Discharge Physician:  Tolu Saleh MD, MD             Admission Diagnoses:   Alcohol abuse [F10.10]  Anemia due to blood loss, acute [D62]  Gastrointestinal hemorrhage, unspecified gastrointestinal hemorrhage type [K92.2]          Principle Discharge Diagnosis:       GI bleed with Acute/Symptomatic on Chronic Normocytic Anemia    See hospital course for further active diagnoses addressed during this admission.            Procedures:   EGD 2/28/20          Medications Prior to Admission:     No medications prior to admission.             Discharge Medications:     Discharge Medication List as of 2/28/2020  3:23 PM      CONTINUE these medications which have NOT CHANGED    Details   acetaminophen 500 MG PO tablet Take 1-2 tablets (500-1,000 mg) by mouth every 6 hours as needed for mild pain Do not take more than 3000mg acetaminophen total in one day., Disp-100 tablet, R-3, E-Prescribe      acetaminophen-codeine 300-30 MG PO tablet Take 1-2 tablets by mouth daily as needed for severe pain, Disp-20 tablet, R-0, E-Prescribe      albuterol (VENTOLIN HFA) 108 (90 Base) MCG/ACT inhaler Inhale 2 puffs into the lungs every 4 hours as needed for shortness of breath / dyspnea or wheezing, Disp-18 g, R-11, E-Prescribe      atorvastatin 20 MG PO tablet Take 1 tablet (20 mg) by mouth daily, Disp-90 tablet, R-3, E-Prescribe      baclofen (LIORESAL) 10 MG tablet TAKE 1/2 TO 1 TABLET BY MOUTH UP TO THREE TIMES A DAY, Disp-90 tablet, R-3, E-Prescribe      buPROPion (WELLBUTRIN SR) 150 MG 12 hr tablet Take once per day for 3 days and then increase to twice per day, Disp-60 tablet, R-12, E-Prescribe      furosemide (LASIX) 20 MG tablet Take 1 tablet (20 mg) by mouth every morning, Disp-90 tablet, R-3, E-PrescribeThe  patient requests that this prescription be held on file for filling in the near future.      gabapentin (NEURONTIN) 300 MG capsule Take 2 capsules (600 mg) by mouth 3 times daily, Disp-180 capsule, R-11, E-Prescribe      loratadine (CLARITIN) 10 MG tablet Take 1 tablet (10 mg) by mouth daily as needed for allergies, Disp-30 tablet, R-11, E-Prescribe      mometasone-formoterol (DULERA) 200-5 MCG/ACT oral inhaler Inhale 2 puffs into the lungs 2 times daily as needed , Historical      montelukast (SINGULAIR) 10 MG tablet Take 1 tablet (10 mg) by mouth At Bedtime, Disp-90 tablet, R-3, E-Prescribe      nicotine 2 MG MT lozenge Place 1 lozenge (2 mg) inside cheek every hour as needed for smoking cessation, Disp-108 lozenge, R-11, E-Prescribe      !! order for DME Equipment being ordered: 4 wheel walkerDisp-1 Units, R-0, Local Print      !! order for DME Equipment being ordered: 2 pairs thigh high compression stockings, strength per patient preferenceDisp-2 Units, R-1, Local Print      !! order for DME Equipment being ordered: Compression Stockings TWO (2) Pairs, 15-20 mm Hg.Disp-2 Package, R-prn, Local Print      !! order for DME Equipment being ordered: bed pull up barDisp-1 Units, R-0, Local Print      !! order for DME Equipment being ordered: shower chairDisp-1 Device, R-0, Local Print      pantoprazole (PROTONIX) 40 MG EC tablet Take 1 tablet (40 mg) by mouth 2 times daily, Disp-120 tablet, R-0, E-Prescribe      POTASSIUM CHLORIDE PO Take 10 mEq by mouth every morning , Historical      propranolol (INDERAL) 20 MG tablet Take 1 tablet (20 mg) by mouth 2 times daily, Disp-180 tablet, R-3, E-Prescribe      rifaximin (XIFAXAN) 550 MG TABS tablet Take 1 tablet (550 mg) by mouth 2 times daily, Disp-180 tablet, R-3, E-Prescribe      spironolactone (ALDACTONE) 50 MG tablet Take 50 mg by mouth daily, Historical      tiotropium (SPIRIVA) 18 MCG capsule Inhale 1 capsule into the lungs daily Using PRN, Historical      traZODone  "(DESYREL) 50 MG tablet Take 1 tablet (50 mg) by mouth At Bedtime, Disp-90 tablet, R-3, E-Prescribe       !! - Potential duplicate medications found. Please discuss with provider.                Consultations:   Consultation during this admission received from surgery          Brief History of Illness:     From Admission H+P:   Abhijeet Mccauley is a 59 year old male who presents with GI bleeding.     He has been noticing both bright red blood and melena for the past 2 months or so. He reports noticing this weekly. He was evaluated by his PCP on 2/19/20. He hemoglobin was 6.1, he was advised to go the ED but refused opting for outpatient infusion. This was in the process of being arranged. He was evaluated by GI on 2/26/20 and was recommended to get EGD and colonoscopy, again recommended transfusion. He has not had any abdominal discomfort or heart burn. He has been taking ibuprofen daily, unsure how many times a day (\"whatever the bottle says\"). He also continues to drink though has been cutting back. Yesterday he had 3 cocktails. He had some mild withdrawal symptoms last week (cold sweats and tremors) because he had cut back significantly.     He presented to the ED today due to worsening lightheadedness and shortness of breath which has been progressing in the past week. He states he was feverish and had nausea with emesis a few days ago. He did not notice any blood or coffee ground appearing emesis. His symptoms have since resolved.      He was also found to have a UTI on his clinic visit 2/19/20 and was prescribed Bactrim which was appropriate based on urine culture. He has one more day to finish his antibiotic course. His symptoms of dysuria have resolved.      He currently denies fever, chills, cough, congestion, sore throat, palpitations, chest pain, abdominal pain, nausea, vomiting, diarrhea, changes in urination.               TODAY:     Subjective:  No new concerns.  No clearly black or bloody stools since " "admission.  No pain.  No light-headedness or dizziness.  No dyspnea.    No obvious withdrawal symptoms.      ROS:   ROS: 10 point ROS neg other than the symptoms noted above in the HPI.   /43 (BP Location: Left arm)   Pulse 67   Temp 98.2  F (36.8  C) (Oral)   Resp 16   Ht 1.702 m (5' 7\")   Wt 85.2 kg (187 lb 13.3 oz)   SpO2 97%   BMI 29.42 kg/m     EXAM:  General: awake and alert, NAD, oriented x 3  Head: normocephalic  Neck: unremarkable, no lymphadenopathy   HEENT: oropharynx pink and moist    Heart: Regular rate and rhythm, no murmurs, rubs, or gallops  Lungs: clear to auscultation bilaterally with good air movement throughout  Abdomen: soft, non-tender, no masses or organomegaly  Extremities: no edema in lower extremities   Skin unremarkable.            Hospital Course:     Abhijeet Mccauley is a 59 year old male admitted on 2/27/2020. He has history of alcohol cirrhosis, alcohol dependence with withdrawal, acute myeloid leukemia in remission and prior GI bleeds. He presents with blood in his stool and lightheadedness.    After discussion of recommendations that patient stay another day to confirm that hemoglobin remains stable and likely do colonoscopy tomorrow patient refused and elected to leave AMA today.  Discussed risks including of hemoglobin trending down again prior to his follow-up with his primary care provider - patient aware and understands.  Will follow-up with primary care provider on Monday and recheck hemoglobin at that time.  Recommend outpatient colonoscopy and if negative consideration per prior evaluations of capsule study vs pursuing other etiologies.  Does appear to be due to blood loss with clearly black/bloody reported stools and guiac positive in the ER.       GI bleed with Acute/Symptomatic on Chronic Normocytic Anemia  2/27/20 -- 2 months of intermittent bright red blood and melena. 1 week of lightheadedness/shortness of breath. Hemoglobin 5.8 (last 6.1 on 2/19/20). " Recent baseline 6.1 - 7.6. Stool guaiac positive. Recently evaluated by GI who recommended EGD as well as colonoscopy with consideration of video capsule study if work up unremarkable.   EGD 1/2018 showed grade II esophageal varices which were banded at that time. EGD 2/2018 showed candidiasis esophagitis and gastric ulcers. Colonoscopy in 3/2018 showed multiple polyps and diverticulosis. Previous lab work in 2018 showed iron deficiency, likely element of chronic blood loss.  Appears to be GI bleed, unclear if upper, lower or both given BRBPR and melena reported. History of varices s/p banding and ulcers in the past. Differential is broad and includes variceal bleed, gastric/duodenal ulcer, esophagitis, gastritis vs lower etiology. Hemodynamically stable, appears to be a somewhat slow bleed with gradual downtrend of hemoglobin. Plan to transfuse 2 units of pRBC in ED. ED discussed with surgery who plans to do scope tomorrow, will start with EGD initially and do colonoscopy pending findings and improvement of GENEVIEVE.  - NPO  - IV pantoprazole Q12 hours  - surgery consult, plan for EGD tomorrow and colonoscopy if needed when GENEVIEVE improving  - check hemoglobin 2 hours following blood transfusion  - transfuse for hemoglobin < 7.0  - follow hemoglobin Q8 hours  - IVF: LR at 125 ml/hr   - advised patient to stop using alcohol and NSAIDs  2/28/2020 -- EGD showed just some gastritis, no clear source of recent bleeding.  Recommend abstinence from alcohol (patient says he's planning on this) and continuing protonix twice daily.  Recommended another night inpatient at least as above, but patient elected to leave AMA.  Follow-up with primary care provider as above.       Acute Kidney Injury on CKD  2/27/20 -- Creatinine 2.94. Baseline creatinine 1.9-2. Most likely etiology is pre-renal related to poor intake in past few days. May have component of intrinsic related to Bactrim use and NSAIDs.  - IVF: LR at 125 ml/hr  - Monitor  "I/O's, daily weights  - stop NSAIDS  - avoid nephrotoxic agents  - renally dose medications  2/28/2020 -- recheck BMP at follow-up with primary care provider.     Recently Diagnosed UTI  Prescribed Bactrim for UTI which was appropriate based on cultures. He has one more dose of Bactrim and is not longer feeling symptomatic.  - monitor for recurrence      Alcohol Dependence, history of withdrawal  2/27/20 -- Reports drinking 6 pack per day, cutting back in the past week trying to quit. Had some mild withdrawal symptoms at home. Last drank \"a few cocktails\" evening prior to admission. He does have history of withdrawal and seizure on chart. On admission no clear signs of withdrawal.  - CIWA protocol in place with PRN ativan  - thiamine, folic acid, multivitamin daily  2/28/2020 -- no obvious withdrawal, patient aware of withdrawal symptoms and says he thinks he already went through it last week, then had just a single drink the day prior to admission - if true then he would be low risk but unclear- patient aware that his withdrawal symptoms may worsen in the next 2 days,  Plans to remain sober, does not want further help.       Alcoholic Cirrhosis  Thrombocytopenia  Coagulopathy due to liver disease  On admission labs around recent baseline values: INR 1.58; Platelets 129; Bilirubin 1.4, alk phos 256, ALT 26, AST 36.   MELD-Na score: 24 at 2/27/2020  2:11 PM  MELD score: 23 at 2/27/2020  2:11 PM  Calculated from:  Serum Creatinine: 2.94 mg/dL at 2/27/2020  2:11 PM  Serum Sodium: 135 mmol/L at 2/27/2020  2:11 PM  Total Bilirubin: 1.4 mg/dL at 2/27/2020  2:11 PM  INR(ratio): 1.58 at 2/27/2020  2:11 PM  Age: 59 years  - continue follow up with GI  - encouraged alcohol abstinence, care transitions consult for resources     Chronic Back Pain  - continue home gabapentin at reduced dose due to GENEVIEVE  - continue home Tyenol #3 prn     Acute Myeloid Leukemia, in remission  Last bone marrow biopsy in 7/2018 showed no evidence of " recurrence,   - continue outpatient surveillance      Tobacco use with probable COPD: nicotine lozenge available. Encouraged cessation, patient working to quit.     Diet: NPO  DVT Prophylaxis: Low Risk/Ambulatory with no VTE prophylaxis indicated  Chong Catheter: not present  Code Status: Full code                Discharge Instructions and Follow-Up:     Discharge diet: Orders Placed This Encounter      Advance Diet as Tolerated: Clear Liquid Diet       Discharge activity: Activity as tolerated   Discharge follow-up: Follow up with primary care provider on Monday           Discharge Disposition:     As above, patient left AMA prior to further work-up, management.       Attestation:  I have reviewed today's vital signs, notes, medications, labs and imaging.  Amount of time performed on this discharge summary: 45 minutes.    Tolu Saleh MD, MD

## 2020-02-28 NOTE — ANESTHESIA POSTPROCEDURE EVALUATION
Patient: Abhijeet Mccauley    Procedure(s):  ESOPHAGOGASTRODUODENOSCOPY, WITH BIOPSY    Diagnosis:Anemia, unspecified type [D64.9]  Diagnosis Additional Information: No value filed.    Anesthesia Type:  MAC    Note:  Anesthesia Post Evaluation    Patient location during evaluation: Bedside  Patient participation: Able to fully participate in evaluation  Level of consciousness: awake and alert  Pain management: adequate  Airway patency: patent  Cardiovascular status: acceptable  Respiratory status: acceptable  Hydration status: acceptable  PONV: none     Anesthetic complications: None          Last vitals:  Vitals:    02/28/20 0042 02/28/20 0328 02/28/20 0833   BP: 135/73 111/47 (!) 113/34   Pulse: 59 65 67   Resp: 18 18 16   Temp: 36.7  C (98  F) 36.7  C (98  F) 36.9  C (98.5  F)   SpO2: 96% 100% 96%         Electronically Signed By: Edinson Mays CRNA, APRN CRNA  February 28, 2020  12:13 PM

## 2020-02-28 NOTE — CONSULTS
CARE TRANSITION SOCIAL WORK INITIAL ASSESSMENT:      Met with: NIKO attempted to meet with patient several times today, but he was out of his room x2.  SW attempted to meet with him once back in the room, but pt is leaving AMA, per pt & RN.    NIKO unable to complete full assessment for pt, however, pt is well known to this writer.      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    Insurance Concerns: No Insurance issues identified     PLAN    Pt leaving AMA.  NIKO will send CTS letter to PCP & Clinic Care Coordinator due to hospitalization.    RUDY Morrow  [facility] [phone]   [facility]  [phone]

## 2020-03-02 ENCOUNTER — PATIENT OUTREACH (OUTPATIENT)
Dept: CARE COORDINATION | Facility: CLINIC | Age: 60
End: 2020-03-02

## 2020-03-02 ASSESSMENT — ACTIVITIES OF DAILY LIVING (ADL): DEPENDENT_IADLS:: TRANSPORTATION

## 2020-03-02 NOTE — PROGRESS NOTES
Clinic Care Coordination Contact    Clinic Care Coordination Contact  OUTREACH    Referral Information:  Patient in ED for GI bleed   Referral Source: Other, specify(CTS)    Primary Diagnosis: Psychosocial    Chief Complaint   Patient presents with     Clinic Care Coordination - Post Hospital     SW follow up        Universal Utilization: Patient at [facility] ED 2/27 to 2/28/2020 for GI bleed.   Per ED report, Patient presented with worsening lightheadedness, shortness of breath progressing over the past week.  Patient reported he was having bright red blood, he has been noticing this for past 2 months.  Patient's Hgb was 6.1 at recent PCP visit, it was recommended that he go to ED.  Patient opted for outpatient transfusion.  Patient at GI visit recently who also recommended a transfusion.  EGD and colonoscopy recommended.  It was found Patient had a UTI in recent visit.  Patient reported that he has not been drinking as often, stated he had 3 cocktails the night before this visit (2/26/2020).  Per telephone call with Patient, he is doing okay.  He reports he has appt with PCP this week to discuss colonoscopy.  He reports that EGD done in the ED showed no concerns   Clinic Utilization  Difficulty keeping appointments:: Yes  Compliance Concerns: Yes  No-Show Concerns: Yes  No PCP office visit in Past Year: No  Utilization    Last refreshed: 3/2/2020  1:17 PM:  Hospital Admissions 1           Last refreshed: 3/2/2020  1:17 PM:  ED Visits 2           Last refreshed: 3/2/2020  1:17 PM:  No Show Count (past year) 12              Current as of: 3/2/2020  1:17 PM            Clinical Concerns: Patient at [facility] ED 2/27 to 2/28/2020 for GI bleed.   Per ED report, Patient presented with worsening lightheadedness, shortness of breath progressing over the past week.  Patient reported he was having bright red blood, he has been noticing this for past 2 months.  Patient's Hgb was 6.1 at recent PCP visit, it was  "recommended that he go to ED.  Patient opted for outpatient transfusion.  Patient at GI visit recently who also recommended a transfusion.  EGD and colonoscopy recommended.  It was found Patient had a UTI in recent visit.  Patient reported that he has not been drinking as often, stated he had 3 cocktails the night before this visit (2/26/2020).  Per telephone call with Patient, he is doing okay.  He reports he has appt with PCP this week to discuss colonoscopy.  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)     CKD (chronic kidney disease) stage 3, GFR 30-59 ml/min (H)     Rosacea     Mild intermittent asthma without complication     Peptic ulcer disease     Alcohol dependence (H)     Tobacco dependence syndrome     Tubular adenoma of colon     Normocytic anemia     Leukocytosis with increased monocytes     Generalized weakness     AIDP (acute inflammatory demyelinating polyneuropathy) (H)     Primary osteoarthritis of left knee     Intermediate risk for ASCVD (arteriosclerotic cardiovascular disease)     Esophageal varices (H)     Chronic midline low back pain with bilateral sciatica     GI bleed     Current Behavioral Concerns: none noted   Education Provided to patient: none, obtained update   Pain  Pain (GOAL):: Yes  Type: Chronic (>3mo)  Location of chronic pain:: back of legs   Radiating: Yes  Location pain radiates to: \"all over his body\"   Progression: Unchanged  Description of pain: Burning, Throbbing, Nagging  Chronic pain severity:: 9  Limitation of routine activities due to chronic pain:: Yes  Description: Unable to perform most daily activities " (chores, hobbies, social activities, driving)  Alleviating Factors: Exercise  Aggravating Factors: Activity, Inactivity  Health Maintenance Reviewed: Due/Overdue   Health Maintenance Due   Topic Date Due     PREVENTIVE CARE VISIT  1960     SPIROMETRY  1960     URINE DRUG SCREEN  1960     ADVANCE CARE PLANNING  1960     COPD ACTION PLAN  1960     ZOSTER IMMUNIZATION (1 of 2) 12/09/2008     Clinical Pathway: None    Medication Management: Patient reports no concerns with taking medications as prescribed, he states he can afford      Functional Status:  Dependent ADL's:: Ambulation-walker  Dependent IADLs:: Transportation  Bed or wheelchair confined:: Yes  Mobility Status: Independent w/Device  Fallen 2 or more times in the past year?: No  Any fall with injury in the past year?: No    Living Situation:  Current living arrangement:: I live alone  Type of residence:: Apartment    Lifestyle & Psychosocial Needs:  Patient has several [facility] services to provide support in his home.  [facility] Worker, Home Care, PCA.  [employer] [place] [employer] program, Mom's Meals         Diet:: Regular  Inadequate nutrition (GOAL):: No  Tube Feeding: No  Inadequate activity/exercise (GOAL):: No  Significant changes in sleep pattern (GOAL): No  Transportation means:: Medical transport  Financial/Insurance concerns (GOAL):: No  Restorationist or spiritual beliefs that impact treatment:: No  Mental health DX:: No  Mental health management concern (GOAL):: No  Informal Support system:: Family, Other   Socioeconomic History     Marital status: Single     Spouse name: Not on file     Number of children: Not on file     Years of education: Not on file     Highest education level: Not on file     Tobacco Use     Smoking status: Current Every Day Smoker     Packs/day: 0.50     Years: 30.00     Pack years: 15.00     Types: Cigarettes     Smokeless tobacco: Never Used     Tobacco comment: 5 cigarettes a day    Substance and Sexual  Activity     Alcohol use: Yes     Comment: Down to 3 beers per day.  Sometimes a cocktail also.     Drug use: Yes     Types: Marijuana       Resources and Interventions:  Current Resources: County Worker, Home Care, PCA.  [employer] [place] [employer] program, Mom's Meals      Community Resources: Other (see comment), County Worker, Home Care, PCA  Supplies used at home:: Compression Stockings  Equipment Currently Used at Home: walker, standard, raised toilet, shower chair    Advance Care Plan/Directive  Advanced Care Plans/Directives on file:: No  Advanced Care Plan/Directive Status: Not Applicable    Referrals Placed: Other*(colonoscopy)     Goals: Goals are completed, he continues to attend appointments as scheduled the majority of the time       Patient/Caregiver understanding: Patient had no concerns/questions, he states he has transport for upcoming appt with PCP     Outreach Frequency: monthly  Future Appointments              In 3 days [place] [facility]    In 3 days Chidi Valenzuela MD [facility]  Arrive at: Clinic A    In 1 week Chidi Valenzuela MD [facility]  Arrive at: Clinic A    In 1 week Ai Min AuD [facility]    In 1 week Kenton Barron MD [facility]    In 2 weeks [place] [facility]    In 4 weeks Brynn Gibson, PT [facility]          Plan: 1) SW will defer to Care Coordination RN to follow Patient for medical needs/assessment.  Patient has several resources in the community including a  to oversee those resources.  SW will no longer intervene and will route to CC RN to follow up as needed     Catherine De La Cruz, LSW, MSW   [facility]  Care Coordination  [facility]   [phone]  3/2/2020 4:34 PM

## 2020-03-03 ENCOUNTER — TELEPHONE (OUTPATIENT)
Dept: GASTROENTEROLOGY | Facility: CLINIC | Age: 60
End: 2020-03-03

## 2020-03-03 DIAGNOSIS — Z86.0100 HISTORY OF COLONIC POLYPS: ICD-10-CM

## 2020-03-03 DIAGNOSIS — D62 ANEMIA DUE TO BLOOD LOSS, ACUTE: Primary | ICD-10-CM

## 2020-03-04 LAB — COPATH REPORT: NORMAL

## 2020-03-04 NOTE — PROGRESS NOTES
"Subjective     Abhijeet Mccauley is a 59 year old male who presents to clinic today for the following health issues:    HPI       Chief Complaint   Patient presents with     Hospital F/U     Pt here for post hospital f/u.     Refill Request     pain med         Hospital Follow-up Visit:    Hospital/Nursing Home/IP Rehab Facility: [facility]  Date of Admission: 2/27/20  Date of Discharge: 2/28/20  Reason(s) for Admission: gi bleed            Problems taking medications regularly:  None       Medication changes since discharge: None       Problems adhering to non-medication therapy:  None    Summary of hospitalization:  [facility] discharge summary reviewed    I saw Abhijeet last on 2/19 and his hemoglobin had decreased further to 6.1, and I recommended blood transfusion.  He was not able to get into an outpatient infusion center so he ended up presenting to the ED on 2/27 and was admitted for the anemia down to 5.8 and GI bleed.  He was transfused and had an EGD that showed gastritis, no clear sign of bleeding.  Alcohol abstinence and continuing BID pantoprazole recommended.  He was noted to have GENEVIEVE and was given IVF and advised to stop NSAIDs.    He left AMA despite recommendation to stay for colonoscopy and ensuring hemoglobin stability.      He did see GI on 2/26    Diagnostic Tests/Treatments reviewed.  Follow up needed: BMP, Hgb  Other Healthcare Providers Involved in Patient s Care:         Specialist appointment - GI  Update since discharge: improved overall.     Abhijeet reports that his hip pain is worsening- Tylenol #3 helps if he just takes one per day.  Energy level is better since the transfusion.  No further blood in the stool.  He's been avoiding ibuprofen since discharge.  Had a couple of cocktails since discharge \"that's about it\".      The still feels he has dysuria and increased urinary frequency and urgency.  Antibiotics were somewhat helpful.  He finished antibiotics about three " days ago.      Post Discharge Medication Reconciliation: discharge medications reconciled, continue medications without change.  Plan of care communicated with patient     Coding guidelines for this visit:  Type of Medical   Decision Making Face-to-Face Visit       within 7 Days of discharge Face-to-Face Visit        within 14 days of discharge   Moderate Complexity 11427 [id number]   High Complexity 92253 [id number]            Medication Followup of tylenol #3    Taking Medication as prescribed: NO-currently out    Side Effects:  None    Medication Helping Symptoms:  yes         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)     CKD (chronic kidney disease) stage 3, GFR 30-59 ml/min (H)     Rosacea     Mild intermittent asthma without complication     Peptic ulcer disease     Alcohol dependence (H)     Tobacco dependence syndrome     Tubular adenoma of colon     Normocytic anemia     Leukocytosis with increased monocytes     Generalized weakness     AIDP (acute inflammatory demyelinating polyneuropathy) (H)     Primary osteoarthritis of left knee     Intermediate risk for ASCVD (arteriosclerotic cardiovascular disease)     Esophageal varices (H)     Chronic midline low back pain with bilateral sciatica     GI bleed     Past Surgical History:   Procedure Laterality Date     AS TOTAL KNEE ARTHROPLASTY Left      BONE MARROW BIOPSY, BONE SPECIMEN, NEEDLE/TROCAR N/A 6/12/2018    Procedure: BIOPSY BONE MARROW;  Bone Marrow Biopsy;  Surgeon: Demar Sapp MD;  Location: [facility]     ESOPHAGOSCOPY, GASTROSCOPY, DUODENOSCOPY (EGD), COMBINED N/A 2/8/2018    Procedure: COMBINED ESOPHAGOSCOPY, GASTROSCOPY, DUODENOSCOPY (EGD), BIOPSY SINGLE OR MULTIPLE;   gastroscopy with biopsies;  Surgeon: Raz Cobb MD;  Location: [facility]     ESOPHAGOSCOPY, GASTROSCOPY, DUODENOSCOPY (EGD), COMBINED N/A 3/18/2018    Procedure: COMBINED ESOPHAGOSCOPY, GASTROSCOPY, DUODENOSCOPY (EGD);;  Surgeon: Raz Cobb MD;  Location: [facility]     ESOPHAGOSCOPY, GASTROSCOPY, DUODENOSCOPY (EGD), COMBINED N/A 2/28/2020    Procedure: ESOPHAGOGASTRODUODENOSCOPY, WITH BIOPSY;  Surgeon: Chente Mclaughlin DO;  Location: [facility]     LACERATION REPAIR Right     Right leg     PHACOEMULSIFICATION WITH STANDARD INTRAOCULAR LENS IMPLANT Left 7/1/2019    Procedure: cataract removal with implant.;  Surgeon: Adrian Oliveira MD;  Location: [facility]     PHACOEMULSIFICATION WITH STANDARD INTRAOCULAR LENS IMPLANT Right 7/29/2019    Procedure: Cataract removal with implant.;  Surgeon: Adrian Oliveira MD;  Location: [facility]       Social History     Tobacco Use     Smoking status: Current Every Day Smoker     Packs/day: 0.50     Years: 30.00     Pack years: 15.00     Types: Cigarettes     Smokeless tobacco: Never Used     Tobacco comment: 5 cigarettes a day    Substance Use Topics     Alcohol use: Yes     Comment: Down to 3 beers per day.  Sometimes a cocktail also.     Family History   Problem Relation Age of Onset     Hypertension Mother      Coronary Artery Disease Father         MI         Current Outpatient Medications   Medication Sig Dispense Refill     acetaminophen-codeine (TYLENOL #3) 300-30 MG tablet Take 1-2 tablets by mouth daily as needed for severe pain 20 tablet 0     albuterol (VENTOLIN HFA) 108 (90 Base) MCG/ACT inhaler Inhale 2 puffs into the lungs every 4 hours as needed for shortness of breath / dyspnea or wheezing 18 g 11     atorvastatin 20 MG PO tablet Take 1 tablet (20 mg) by mouth daily 90 tablet 3     baclofen (LIORESAL) 10 MG tablet TAKE 1/2 TO 1 TABLET BY MOUTH UP TO THREE TIMES A DAY (Patient taking differently: Take 10 mg by mouth 3 times daily ) 90 tablet 3      buPROPion (WELLBUTRIN SR) 150 MG 12 hr tablet Take once per day for 3 days and then increase to twice per day (Patient taking differently: Take 150 mg by mouth 2 times daily ) 60 tablet 12     furosemide (LASIX) 20 MG tablet Take 1 tablet (20 mg) by mouth every morning 90 tablet 3     gabapentin (NEURONTIN) 300 MG capsule Take 2 capsules (600 mg) by mouth 3 times daily 180 capsule 11     loratadine (CLARITIN) 10 MG tablet Take 1 tablet (10 mg) by mouth daily as needed for allergies (Patient taking differently: Take 10 mg by mouth daily ) 30 tablet 11     montelukast (SINGULAIR) 10 MG tablet Take 1 tablet (10 mg) by mouth At Bedtime 90 tablet 3     nicotine 2 MG MT lozenge Place 1 lozenge (2 mg) inside cheek every hour as needed for smoking cessation 108 lozenge 11     pantoprazole (PROTONIX) 40 MG EC tablet Take 1 tablet (40 mg) by mouth 2 times daily 120 tablet 0     POTASSIUM CHLORIDE PO Take 10 mEq by mouth every morning        propranolol (INDERAL) 20 MG tablet Take 1 tablet (20 mg) by mouth 2 times daily 180 tablet 3     rifaximin (XIFAXAN) 550 MG TABS tablet Take 1 tablet (550 mg) by mouth 2 times daily 180 tablet 3     spironolactone (ALDACTONE) 50 MG tablet Take 50 mg by mouth daily       tiotropium (SPIRIVA) 18 MCG capsule Inhale 1 capsule into the lungs daily Using PRN       traZODone (DESYREL) 50 MG tablet Take 1 tablet (50 mg) by mouth At Bedtime 90 tablet 3     acetaminophen 500 MG PO tablet Take 1-2 tablets (500-1,000 mg) by mouth every 6 hours as needed for mild pain Do not take more than 3000mg acetaminophen total in one day. 100 tablet 3     mometasone-formoterol (DULERA) 200-5 MCG/ACT oral inhaler Inhale 2 puffs into the lungs 2 times daily as needed        order for DME Equipment being ordered: 4 wheel walker 1 Units 0     order for DME Equipment being ordered: 2 pairs thigh high compression stockings, strength per patient preference 2 Units 1     order for DME Equipment being ordered: Compression  "Stockings TWO (2) Pairs, 15-20 mm Hg. 2 Package prn     order for DME Equipment being ordered: bed pull up bar 1 Units 0     order for DME Equipment being ordered: shower chair 1 Device 0     Allergies   Allergen Reactions     Blood Transfusion Related (Informational Only)      Patient has a history of a clinically significant antibody against RBC antigens.  A delay in compatible RBCs may occur.     Famotidine Unknown and Other (See Comments)     Severe abdominal cramps     Cyclobenzaprine Hives     Methocarbamol Other (See Comments)     Made pt's \"face break out\"     Pepcid Cramps     Severe abdominal cramps     Vancomycin Other (See Comments)     hallucinations     Vfend Other (See Comments)     IV - cold sweats, Iron tablet makes him nauseated and stomach ached     Hydrocodone-Acetaminophen Rash       Reviewed and updated as needed this visit by Provider         Review of Systems   ROS COMP: Constitutional, gi and gu systems are negative, except as otherwise noted.      Objective    /62   Pulse 72   Temp 98.3  F (36.8  C) (Tympanic)   Resp 16   Ht 1.702 m (5' 7\")   Wt 86.4 kg (190 lb 8 oz)   SpO2 98%   BMI 29.84 kg/m    Body mass index is 29.84 kg/m .  Physical Exam     GENERAL: appears less pale and more energetic than last visit, alert and no distress  RESP: lungs clear to auscultation - no rales, rhonchi or wheezes  CV: regular rate and rhythm, normal S1 S2, no S3 or S4, no murmur, click or rub  ABDOMEN: soft, non-tender    Diagnostic Test Results:  Labs reviewed in Epic  Results for orders placed or performed in visit on 03/05/20 (from the past 24 hour(s))   Hemoglobin   Result Value Ref Range    Hemoglobin 8.2 (L) 13.3 - 17.7 g/dL   Comprehensive metabolic panel   Result Value Ref Range    Sodium 135 133 - 144 mmol/L    Potassium 4.2 3.4 - 5.3 mmol/L    Chloride 107 94 - 109 mmol/L    Carbon Dioxide 24 20 - 32 mmol/L    Anion Gap 4 3 - 14 mmol/L    Glucose 132 (H) 70 - 99 mg/dL    Urea Nitrogen " 22 7 - 30 mg/dL    Creatinine 2.10 (H) 0.66 - 1.25 mg/dL    GFR Estimate 33 (L) >60 mL/min/[1.73_m2]    GFR Estimate If Black 38 (L) >60 mL/min/[1.73_m2]    Calcium 9.2 8.5 - 10.1 mg/dL    Bilirubin Total 1.5 (H) 0.2 - 1.3 mg/dL    Albumin 3.4 3.4 - 5.0 g/dL    Protein Total 7.4 6.8 - 8.8 g/dL    Alkaline Phosphatase 294 (H) 40 - 150 U/L    ALT 23 0 - 70 U/L    AST 38 0 - 45 U/L           Assessment & Plan     1. Anemia due to GI blood loss    Hgb is stable, no further GI bleeding noted since discharge.  He has stopped NSAIDs though is still occasionally drinking alcohol.  Needs to schedule colonoscopy- discussed why his hospitalist recommended he have this at the [facility] rather than our hospital due to his comorbidities and he is agreeable to going to the [facility].  I am hoping this can be completed before his upcoming surgery.  We will recheck Hgb at his scheduled preop.      - Hemoglobin    2. Bilateral hip osteonecrosis (H)    He reports worsening pain, Tylenol 3 refilled.  Surgery scheduled.     - acetaminophen-codeine (TYLENOL #3) 300-30 MG tablet; Take 1-2 tablets by mouth daily as needed for severe pain  Dispense: 20 tablet; Refill: 0    3. Dysuria    He was unable to leave urine sample and will try to collect one at home.     - UA reflex to Microscopic and Culture  - *UA reflex to Microscopic and Culture ([place] [facility] (except [place] and [place]); Future    4. Acute kidney injury (H)    Creatinine improved today back to recent baseline    - Comprehensive metabolic panel    5. Alcoholic cirrhosis of liver without ascites (H)    LFTs improved today.  He has cut down on drinking, but has not been abstinent.      - Comprehensive metabolic panel     Chidi Valenzuela MD  [facility]

## 2020-03-05 ENCOUNTER — OFFICE VISIT (OUTPATIENT)
Dept: FAMILY MEDICINE | Facility: CLINIC | Age: 60
End: 2020-03-05
Payer: COMMERCIAL

## 2020-03-05 VITALS
OXYGEN SATURATION: 98 % | SYSTOLIC BLOOD PRESSURE: 106 MMHG | HEART RATE: 72 BPM | HEIGHT: 67 IN | DIASTOLIC BLOOD PRESSURE: 62 MMHG | RESPIRATION RATE: 16 BRPM | WEIGHT: 190.5 LBS | TEMPERATURE: 98.3 F | BODY MASS INDEX: 29.9 KG/M2

## 2020-03-05 DIAGNOSIS — N17.9 ACUTE KIDNEY INJURY (H): ICD-10-CM

## 2020-03-05 DIAGNOSIS — K70.30 ALCOHOLIC CIRRHOSIS OF LIVER WITHOUT ASCITES (H): ICD-10-CM

## 2020-03-05 DIAGNOSIS — K92.2 GI BLEED: Primary | ICD-10-CM

## 2020-03-05 DIAGNOSIS — D50.0 ANEMIA DUE TO GI BLOOD LOSS: Primary | ICD-10-CM

## 2020-03-05 DIAGNOSIS — M87.9 BILATERAL HIP OSTEONECROSIS (H): ICD-10-CM

## 2020-03-05 DIAGNOSIS — N30.01 ACUTE CYSTITIS WITH HEMATURIA: ICD-10-CM

## 2020-03-05 DIAGNOSIS — R30.0 DYSURIA: ICD-10-CM

## 2020-03-05 DIAGNOSIS — D64.9 ANEMIA: ICD-10-CM

## 2020-03-05 LAB
ALBUMIN SERPL-MCNC: 3.4 G/DL (ref 3.4–5)
ALP SERPL-CCNC: 294 U/L (ref 40–150)
ALT SERPL W P-5'-P-CCNC: 23 U/L (ref 0–70)
ANION GAP SERPL CALCULATED.3IONS-SCNC: 4 MMOL/L (ref 3–14)
AST SERPL W P-5'-P-CCNC: 38 U/L (ref 0–45)
BILIRUB SERPL-MCNC: 1.5 MG/DL (ref 0.2–1.3)
BUN SERPL-MCNC: 22 MG/DL (ref 7–30)
CALCIUM SERPL-MCNC: 9.2 MG/DL (ref 8.5–10.1)
CHLORIDE SERPL-SCNC: 107 MMOL/L (ref 94–109)
CO2 SERPL-SCNC: 24 MMOL/L (ref 20–32)
CREAT SERPL-MCNC: 2.1 MG/DL (ref 0.66–1.25)
GFR SERPL CREATININE-BSD FRML MDRD: 33 ML/MIN/{1.73_M2}
GLUCOSE SERPL-MCNC: 132 MG/DL (ref 70–99)
HGB BLD-MCNC: 8.2 G/DL (ref 13.3–17.7)
POTASSIUM SERPL-SCNC: 4.2 MMOL/L (ref 3.4–5.3)
PROT SERPL-MCNC: 7.4 G/DL (ref 6.8–8.8)
SODIUM SERPL-SCNC: 135 MMOL/L (ref 133–144)

## 2020-03-05 PROCEDURE — 36415 COLL VENOUS BLD VENIPUNCTURE: CPT | Performed by: INTERNAL MEDICINE

## 2020-03-05 PROCEDURE — 80053 COMPREHEN METABOLIC PANEL: CPT | Performed by: INTERNAL MEDICINE

## 2020-03-05 PROCEDURE — 99495 TRANSJ CARE MGMT MOD F2F 14D: CPT | Performed by: INTERNAL MEDICINE

## 2020-03-05 PROCEDURE — 85018 HEMOGLOBIN: CPT | Performed by: INTERNAL MEDICINE

## 2020-03-05 ASSESSMENT — MIFFLIN-ST. JEOR: SCORE: 1627.73

## 2020-03-06 ENCOUNTER — PATIENT OUTREACH (OUTPATIENT)
Dept: CARE COORDINATION | Facility: CLINIC | Age: 60
End: 2020-03-06

## 2020-03-06 NOTE — LETTER
[facility]  [address] [place] [address]      March 9, 2020      Abhijeet Coreyafson  [address] [place] [id number]      Dear Abhijeet,    I am a clinic care coordinator who works with Chidi Valenzuela MD at [facility]. I recently tried to contact you to introduce our current care coordination team and provide you with our contact information. Below is a description of clinic care coordination and how we can further assist you.      The clinic care coordinator team is made up of a registered nurse,  and community health worker who understand the health care system. The goal of clinic care coordination is to help you manage your health and improve access to the health care system in the most efficient manner. The team can assist you in meeting your health care goals by providing education, coordinating services, strengthening the communication among your providers  and supporting you with any resource needs.    Please feel free to contact the Community Health Worker at [phone] with any questions or if you're interested in accessing support from our team. We are focused on providing you with the highest-quality healthcare experience possible and that all starts with you.     Sincerely,     Kierra STARR, RN Care Coordinator with [facility]

## 2020-03-09 ENCOUNTER — TELEPHONE (OUTPATIENT)
Dept: FAMILY MEDICINE | Facility: CLINIC | Age: 60
End: 2020-03-09

## 2020-03-09 DIAGNOSIS — R30.0 DYSURIA: ICD-10-CM

## 2020-03-09 DIAGNOSIS — R82.90 NONSPECIFIC FINDING ON EXAMINATION OF URINE: Primary | ICD-10-CM

## 2020-03-09 DIAGNOSIS — D64.9 ANEMIA: ICD-10-CM

## 2020-03-09 DIAGNOSIS — K92.2 GI BLEED: Primary | ICD-10-CM

## 2020-03-09 DIAGNOSIS — K70.30 ALCOHOLIC CIRRHOSIS OF LIVER WITHOUT ASCITES (H): ICD-10-CM

## 2020-03-09 LAB
ALBUMIN UR-MCNC: NEGATIVE MG/DL
APPEARANCE UR: ABNORMAL
BILIRUB UR QL STRIP: NEGATIVE
COLOR UR AUTO: ABNORMAL
GLUCOSE UR STRIP-MCNC: NEGATIVE MG/DL
HGB UR QL STRIP: ABNORMAL
KETONES UR STRIP-MCNC: NEGATIVE MG/DL
LEUKOCYTE ESTERASE UR QL STRIP: ABNORMAL
NITRATE UR QL: NEGATIVE
NON-SQ EPI CELLS #/AREA URNS LPF: ABNORMAL /LPF
PH UR STRIP: 6 PH (ref 5–7)
RBC #/AREA URNS AUTO: ABNORMAL /HPF
SOURCE: ABNORMAL
SP GR UR STRIP: 1.01 (ref 1–1.03)
UROBILINOGEN UR STRIP-ACNC: 0.2 EU/DL (ref 0.2–1)
WBC #/AREA URNS AUTO: >100 /HPF

## 2020-03-09 PROCEDURE — 81001 URINALYSIS AUTO W/SCOPE: CPT | Performed by: INTERNAL MEDICINE

## 2020-03-09 PROCEDURE — 87086 URINE CULTURE/COLONY COUNT: CPT | Performed by: INTERNAL MEDICINE

## 2020-03-09 RX ORDER — SULFAMETHOXAZOLE/TRIMETHOPRIM 800-160 MG
1 TABLET ORAL 2 TIMES DAILY
Qty: 14 TABLET | Refills: 0 | Status: SHIPPED | OUTPATIENT
Start: 2020-03-09 | End: 2020-03-23

## 2020-03-09 NOTE — TELEPHONE ENCOUNTER
He previously agreed to do it through GI, and I would still recommend he do so.  Can you please ask him to reconsider- with his comorbidities, I feel that it would be safer for him to do it as already scheduled on 3/13.  Plus I'm not sure that we'll be able to get him scheduled here before his appointment with me on 3/17.  We may have to delay his hip surgery if the colonoscopy isn't done by then.    Thanks,  Chidi Valenzuela MD

## 2020-03-09 NOTE — PROGRESS NOTES
Clinic Care Coordination Contact  [facility]/Voicemail       Clinical Data: Care Coordinator Outreach    Background: Patient had most recent PCP office visit on 3/5/20. Provider asked if RN CC could place call to patient to review upcoming specialty appts, as he is strongly advised to complete colonoscopy due to recent GI bleed prior to his planned hip surgery.  Patient is scheduled to have colonoscopy on 3/13 at [facility] (location is due to co-morbidities; see PCP OV note).    Outreach attempted x 2.  Left message on patient's voicemail with call back information and requested return call.    NIKO Angel CC contacted patient on 3/2/20. His goal(s) were completed noting he has several community resources in place including county worker and PCA services.      Plan: RN Care Coordinator will mail new introduction letter with updated clinic care coordination contact information. Care Coordinator will do no further outreaches at this time but remain available if future needs arise.    CARMELINA Trivedi, RN   [facility]  - Clinic Care Coordinator  Phone: [phone]

## 2020-03-09 NOTE — TELEPHONE ENCOUNTER
"Patient was given message. He kept cutting me off when I was trying to deliver the message. Patient stated \"his surgery will not be postponed' he agrees to keep appointment. Patient became angry and kept saying he never receives information and that he needs to wait on hold for more than 10 minutes. I gave him the address to the clinic as one of his complaints was that he didn't know where to go. Also tried to give him the number, he responded \"what good is that going to get me, last time I called I waited on the phone for for 10 minutes and no one every answered\". He complained that he never received instructions, I attempted to go over instructions and he replied \"how am I supposed to pay for things, I am on medical assistance, you can't make me pay for stuff\". He then circled back to how no one ever tries to help him.     I let patient know I would address his complaints with provider and clinic administrator as he was very rude to me while on the phone. He would not let me try and help.      LEONARD ThaoN, RN    "

## 2020-03-09 NOTE — TELEPHONE ENCOUNTER
Reason for Call:  Other Colonoscopy    Detailed comments: Patient got referral from Dr. Valenzuela to the  for a colonoscopy. He can't reach them and is extremely frustrated. Wants colonoscopy at [facility]    Phone Number Patient can be reached at: Cell number on file:    Telephone Information:   Mobile [phone]       Best Time: Any  Can we leave a detailed message on this number? YES    Call taken on 3/9/2020 at 3:33 PM by Lia Montalvo      Patient has been referred to the [facility] for colonoscopy, only wants to have procedure done at [facility]. Would like RN to call him

## 2020-03-09 NOTE — TELEPHONE ENCOUNTER
Discussed with SOUTH Navas.  Thanked her for trying to help patient.  Hopefully he will follow through with instructions.  Forwarding this to care coordinator as well as FYI.    Chidi Valenzuela MD

## 2020-03-10 ENCOUNTER — TELEPHONE (OUTPATIENT)
Dept: GASTROENTEROLOGY | Facility: OUTPATIENT CENTER | Age: 60
End: 2020-03-10

## 2020-03-10 DIAGNOSIS — K92.2 GI BLEED: Primary | ICD-10-CM

## 2020-03-10 NOTE — TELEPHONE ENCOUNTER
Patient taking any blood thinners ? No      Heart disease ? Denies      Lung disease ? Asthma. Advised patient to bring inhaler     Sleep apnea ? Denies      Diabetic ? Denies      Kidney disease ? Stage 3     Electronic implanted medical devices ? Denies      Are you taking any narcotic pain medication ? N/a       PTSD ? N/a      Prep instructions reviewed with patient ? Instructions, policy,MAC sedation plan reviewed. Advised patient to have someone stay with them post exam.     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

## 2020-03-10 NOTE — TELEPHONE ENCOUNTER
GALILEO noted if patient re-engages with clinic care coordination. Patient has transportation through his [employer] and has voiced knowing how to arrange a ride but as noted by SOUTH Navas patient will still be unhappy with education or options available.    SOUTH Lozada Care Coordinator

## 2020-03-11 ENCOUNTER — OFFICE VISIT (OUTPATIENT)
Dept: AUDIOLOGY | Facility: CLINIC | Age: 60
End: 2020-03-11
Payer: COMMERCIAL

## 2020-03-11 DIAGNOSIS — H90.3 SENSORINEURAL HEARING LOSS, ASYMMETRICAL: Primary | ICD-10-CM

## 2020-03-11 LAB
BACTERIA SPEC CULT: NORMAL
Lab: NORMAL
SPECIMEN SOURCE: NORMAL

## 2020-03-11 PROCEDURE — 92557 COMPREHENSIVE HEARING TEST: CPT | Performed by: AUDIOLOGIST

## 2020-03-11 PROCEDURE — 92550 TYMPANOMETRY & REFLEX THRESH: CPT | Performed by: AUDIOLOGIST

## 2020-03-11 PROCEDURE — 99207 ZZC NO CHARGE LOS: CPT | Performed by: AUDIOLOGIST

## 2020-03-11 PROCEDURE — V5299 HEARING SERVICE: HCPCS | Performed by: AUDIOLOGIST

## 2020-03-11 NOTE — PROGRESS NOTES
AUDIOLOGY REPORT    SUBJECTIVE:  Abhijeet Mccauley is a 59 year old male who was seen at [facility] for an audiologic evaluation, referred by Dr. Barron.  The patient has been seen previously in this clinic for assessment and results indicated a asymmetrical sensorineural hearing loss bilaterally. The patient reports a decrease in his hearing. He is using Phonak Bolero B50-RI hearing aids with custom earmolds that were fit in 2019. The patient denies bilateral otalgia and bilateral drainage.     OBJECTIVE:    Otoscopic exam indicates ears are clear of cerumen bilaterally       Pure Tone Thresholds assessed using conventional audiometry with good  reliability from 250-8000 Hz bilaterally using insert earphones and circumaural headphones     RIGHT:  normal, mild and moderate sensorineural hearing loss    LEFT:    mild, moderate and severe sensorineural hearing loss    Tympanogram:    RIGHT: normal eardrum mobility    LEFT:   normal eardrum mobility    Reflexes (reported by stimulus ear 1000 Hz):     RIGHT: Ipsilateral is present    RIGHT: Contralateral is present    LEFT: Ipsilateral is present    LEFT: Contralateral is present    Speech Reception Threshold:    RIGHT: 30 dB HL    LEFT:   45 dB HL  Word Recognition Score:     RIGHT: 80% at 75 dB HL using NU-6 recorded word list.    LEFT:   64% at 75 dB HL using NU-6 recorded word list.    Sent bilateral hearing aids in for warranty repair with complaint of hearing aid cuts out with any steady state noise. He reports this started happening 1 month ago. See chart in the hearing aid room.       ASSESSMENT:   Normal to moderate sensorineural hearing loss in the right ear with a mild to severe sensorineural hearing loss in the left ear.     Compared to patient's previous audiogram dated 2/14/2019, hearing has remained stable bilaterally.Today s results were discussed with the patient in detail.     PLAN: Patient was counseled regarding hearing loss  and impact on communication.  Patient will be contacted when his hearing aid is back from repair.  Please call this clinic with questions regarding these results or recommendations.        Ai Min M.A. -AAA  Clinical audiologist [id number]  3/11/2020

## 2020-03-13 ENCOUNTER — DOCUMENTATION ONLY (OUTPATIENT)
Dept: GASTROENTEROLOGY | Facility: OUTPATIENT CENTER | Age: 60
End: 2020-03-13
Payer: COMMERCIAL

## 2020-03-13 ENCOUNTER — TRANSFERRED RECORDS (OUTPATIENT)
Dept: HEALTH INFORMATION MANAGEMENT | Facility: CLINIC | Age: 60
End: 2020-03-13

## 2020-03-17 ENCOUNTER — TELEPHONE (OUTPATIENT)
Dept: FAMILY MEDICINE | Facility: CLINIC | Age: 60
End: 2020-03-17

## 2020-03-17 ENCOUNTER — OFFICE VISIT (OUTPATIENT)
Dept: FAMILY MEDICINE | Facility: CLINIC | Age: 60
End: 2020-03-17
Payer: COMMERCIAL

## 2020-03-17 VITALS
BODY MASS INDEX: 30.01 KG/M2 | DIASTOLIC BLOOD PRESSURE: 64 MMHG | HEART RATE: 67 BPM | SYSTOLIC BLOOD PRESSURE: 116 MMHG | HEIGHT: 67 IN | TEMPERATURE: 97.2 F | RESPIRATION RATE: 18 BRPM | WEIGHT: 191.2 LBS | OXYGEN SATURATION: 97 %

## 2020-03-17 DIAGNOSIS — I10 ESSENTIAL HYPERTENSION: ICD-10-CM

## 2020-03-17 DIAGNOSIS — J45.20 MILD INTERMITTENT ASTHMA WITHOUT COMPLICATION: ICD-10-CM

## 2020-03-17 DIAGNOSIS — K70.31 ALCOHOLIC CIRRHOSIS OF LIVER WITH ASCITES (H): ICD-10-CM

## 2020-03-17 DIAGNOSIS — M79.642 BILATERAL HAND PAIN: ICD-10-CM

## 2020-03-17 DIAGNOSIS — M87.9 BILATERAL HIP OSTEONECROSIS (H): ICD-10-CM

## 2020-03-17 DIAGNOSIS — K70.30 ALCOHOLIC CIRRHOSIS OF LIVER WITHOUT ASCITES (H): ICD-10-CM

## 2020-03-17 DIAGNOSIS — Z01.818 PREOP GENERAL PHYSICAL EXAM: Primary | ICD-10-CM

## 2020-03-17 DIAGNOSIS — N18.30 CKD (CHRONIC KIDNEY DISEASE) STAGE 3, GFR 30-59 ML/MIN (H): ICD-10-CM

## 2020-03-17 DIAGNOSIS — K59.03 DRUG-INDUCED CONSTIPATION: ICD-10-CM

## 2020-03-17 DIAGNOSIS — M79.641 BILATERAL HAND PAIN: ICD-10-CM

## 2020-03-17 DIAGNOSIS — D50.0 ANEMIA DUE TO GI BLOOD LOSS: ICD-10-CM

## 2020-03-17 LAB
ALBUMIN SERPL-MCNC: 3.4 G/DL (ref 3.4–5)
ALP SERPL-CCNC: 292 U/L (ref 40–150)
ALT SERPL W P-5'-P-CCNC: 19 U/L (ref 0–70)
ANION GAP SERPL CALCULATED.3IONS-SCNC: 8 MMOL/L (ref 3–14)
AST SERPL W P-5'-P-CCNC: 32 U/L (ref 0–45)
BILIRUB SERPL-MCNC: 1.1 MG/DL (ref 0.2–1.3)
BUN SERPL-MCNC: 20 MG/DL (ref 7–30)
CALCIUM SERPL-MCNC: 8.9 MG/DL (ref 8.5–10.1)
CHLORIDE SERPL-SCNC: 108 MMOL/L (ref 94–109)
CO2 SERPL-SCNC: 22 MMOL/L (ref 20–32)
COPATH REPORT: NORMAL
CREAT SERPL-MCNC: 2.2 MG/DL (ref 0.66–1.25)
GFR SERPL CREATININE-BSD FRML MDRD: 31 ML/MIN/{1.73_M2}
GLUCOSE SERPL-MCNC: 99 MG/DL (ref 70–99)
HGB BLD-MCNC: 8.1 G/DL (ref 13.3–17.7)
MRSA DNA SPEC QL NAA+PROBE: NEGATIVE
POTASSIUM SERPL-SCNC: 4.5 MMOL/L (ref 3.4–5.3)
PROT SERPL-MCNC: 7.4 G/DL (ref 6.8–8.8)
SODIUM SERPL-SCNC: 138 MMOL/L (ref 133–144)
SPECIMEN SOURCE: NORMAL

## 2020-03-17 PROCEDURE — 99215 OFFICE O/P EST HI 40 MIN: CPT | Performed by: INTERNAL MEDICINE

## 2020-03-17 PROCEDURE — 87641 MR-STAPH DNA AMP PROBE: CPT | Performed by: INTERNAL MEDICINE

## 2020-03-17 PROCEDURE — 87640 STAPH A DNA AMP PROBE: CPT | Mod: 59 | Performed by: INTERNAL MEDICINE

## 2020-03-17 PROCEDURE — 93000 ELECTROCARDIOGRAM COMPLETE: CPT | Performed by: INTERNAL MEDICINE

## 2020-03-17 PROCEDURE — 36415 COLL VENOUS BLD VENIPUNCTURE: CPT | Performed by: INTERNAL MEDICINE

## 2020-03-17 PROCEDURE — 85018 HEMOGLOBIN: CPT | Performed by: INTERNAL MEDICINE

## 2020-03-17 PROCEDURE — 80053 COMPREHEN METABOLIC PANEL: CPT | Performed by: INTERNAL MEDICINE

## 2020-03-17 RX ORDER — FUROSEMIDE 20 MG
20 TABLET ORAL EVERY MORNING
Qty: 90 TABLET | Refills: 3 | Status: ON HOLD | OUTPATIENT
Start: 2020-03-17 | End: 2020-06-25

## 2020-03-17 RX ORDER — ASPIRIN 81 MG
100 TABLET, DELAYED RELEASE (ENTERIC COATED) ORAL 2 TIMES DAILY PRN
Qty: 60 TABLET | Refills: 3 | Status: ON HOLD | OUTPATIENT
Start: 2020-03-17 | End: 2020-06-25

## 2020-03-17 ASSESSMENT — MIFFLIN-ST. JEOR: SCORE: 1630.91

## 2020-03-17 NOTE — TELEPHONE ENCOUNTER
Can somebody help me communicate some info to Dr. Carlton Jones (ortho) by contacting his office to pass on some concerns.    Abhijeet is currently scheduled for a hip arthroplasty next week.  I'm not sure if that will be happening now due to the cancellation of elective surgeries, but if it does, I have a couple of concerns about his health status going into surgery (which will likely continue to be concerns if the surgery is rescheduled for the future as well):    1. He is dealing with ongoing anemia from GI bleeding without clear source.  Hemoglobin has recently been stable around 8 without report of bleeding.  Are they okay with proceeding with surgery with this?    2. He reports he continues to drink 4 alcoholic drinks per day (down from before).  Are they okay proceeding with surgery while he is still drinking or do they require abstinence?    Dr. Jones or his office can certainly contact me for further discussion if needed.     Thanks,  Chidi Valenzuela MD

## 2020-03-17 NOTE — PATIENT INSTRUCTIONS
Avoid ibuprofen for 1 day prior to surgery, avoid naproxen for 3 days prior, and avoid products containing aspirin for 1 week before surgery.  Tylenol is okay to take for pain if needed.    Before Your Surgery      Call your surgeon if there is any change in your health. This includes signs of a cold or flu (such as a sore throat, runny nose, cough, rash or fever).    Do not smoke, drink alcohol or take over the counter medicine (unless your surgeon or primary care doctor tells you to) for the 24 hours before and after surgery.    If you take prescribed drugs: Follow your doctor s orders about which medicines to take and which to stop until after surgery.    Eating and drinking prior to surgery: follow the instructions from your surgeon    Take a shower or bath the night before surgery. Use the soap your surgeon gave you to gently clean your skin. If you do not have soap from your surgeon, use your regular soap. Do not shave or scrub the surgery site.  Wear clean pajamas and have clean sheets on your bed.

## 2020-03-17 NOTE — Clinical Note
We've contacted your office about Abhijeet as well- wondering if you are okay proceeding with surgery with his anemia (Hgb of 8.1, but stable and no recent GI bleeding) and ongoing daily alcohol use.  Thanks.

## 2020-03-17 NOTE — TELEPHONE ENCOUNTER
I shared this information with Dunia, Dr LASHAUN Jones's assistant.  She will pass this on to Dr Jones.  He is out of clinic this week.  Dunia shares that as of now, pt remains on the surgery schedule due to the condition of his hip.    Routed back to Dr Valenzuela.  GALILEO.    Zulema Gramajo RN

## 2020-03-17 NOTE — TELEPHONE ENCOUNTER
Prior Authorization Retail Medication Request    Medication/Dose: Diclofenac 1% Gel  ICD code (if different than what is on RX):    Previously Tried and Failed:  Lidocaine kit; xylocaine 2% gel; lidocaine jelly 10 mL  Rationale:      Insurance Name:  [facility]  Insurance ID:  [id number]        Pharmacy Information (if different than what is on RX)  Name:    Phone:      Thanks,   Leonor Cruz  Certified Pharmacy Technician  [facility]  [phone]

## 2020-03-17 NOTE — PROGRESS NOTES
[facility]  [address] [facility] [address]  [phone]  Dept: [phone]    PRE-OP EVALUATION:  Today's date: 3/17/2020    Abhijeet Mccauley (: 1960) presents for pre-operative evaluation assessment as requested by Dr. Jones, Carlton Bowen MD .  He requires evaluation and anesthesia risk assessment prior to undergoing surgery/procedure for treatment of osteonecrosis of the left hip.    Proposed Surgery/ Procedure: ARTHROPLASTY, HIP, TOTAL  Date of Surgery/ Procedure: 2020  Time of Surgery/ Procedure: 12:30 PM  Hospital/Surgical Facility: [facility]  Primary Physician: Chidi Valenzuela  Type of Anesthesia Anticipated: to be determined    Patient has a Health Care Directive or Living Will:  NO    1. NO - Do you have a history of heart attack, stroke, stent, bypass or surgery on an artery in the head, neck, heart or legs?  2. YES - Do you ever have any pain or discomfort in your chest?   SOMETIMES; GETS SHORT OF AIR AND USES INHALER. PAIN GOES AWAY AFTER INHALER USE.  No recent chest pain  3. NO - Do you have a history of  Heart Failure?  4. YES  - Are you troubled by shortness of breath when: walking on the level, up a slight hill or at night?    Yes- SOB when anemic, improved currently  5. NO - Do you currently have a cold, bronchitis or other respiratory infection?  6. NO - Do you have a cough, shortness of breath or wheezing?   SHORTNESS OF BREATH when more anemic  7. YES - Do you sometimes get pains in the calves of your legs when you walk?   GETS PAINS IN BOTH LEGS AND BACK- OA and osteonecrosis related  8. NO - Do you or anyone in your family have previous history of blood clots?  9. NO - Do you or does anyone in your family have a serious bleeding problem such as prolonged bleeding following surgeries or cuts?  10. YES - Have you ever had problems with anemia or been told to take iron pills?   Yes- anemia, see below  11. YES - Have you  "had any abnormal blood loss such as black, tarry or bloody stools, or abnormal vaginal bleeding?   COLONOSCOPY DONE 03/13/2020 for GI bleeding- no bleeding source found  12. YES - Have you ever had a blood transfusion?   \"MANY OF THEM\" FEELS LIGHTHEADED AND DIZZY; NO COMPLICATIONS  13. NO - Have you or any of your relatives ever had problems with anesthesia?  14. NO - Do you have sleep apnea, excessive snoring or daytime drowsiness?  15. NO - Do you have any prosthetic heart valves?  16. YES - Do you have prosthetic joints?    LEFT KNEE  17. NO - Is there any chance that you may be pregnant?      HPI:     HPI related to upcoming procedure:  Abhijeet has bilateral osteonecrosis of the femoral heads, worse on the left, so left OSCAR is planned.  He is taking Tylenol #3 once per day with relief.  Has about 5 left.      He is being managed for anemia due to GI bleeding.  Was hospitalized last month for anemia as outpatient transfusion was not able to be arranged.  He has a colonoscopy performed recently without source of bleeding found. EGD done last month.  He reports no blood in the stool since hospital discharge.     He reports alcohol use \"not hardly at all\" right now, but on further questioning that means about 4 per day.  \"You'd be surprised how much I cut down.\"    He is having some constipation from the Tylenol #3, not taking anything OTC yet.      Abhijeet would like a handicap parking permit- using walker consistently right now, can only walk a block.  12 month permit completed and given to patient.     See problem list for active medical problems.  Problems all longstanding and stable, except as noted/documented.  See ROS for pertinent symptoms related to these conditions.      MEDICAL HISTORY:     Patient Active Problem List    Diagnosis Date Noted     Esophageal varices (H) 02/27/2020     Priority: Medium     Overview:   On propanolol       GI bleed 02/27/2020     Priority: Medium     Intermediate risk for ASCVD " (arteriosclerotic cardiovascular disease) 02/24/2020     Priority: Medium     Primary osteoarthritis of left knee 10/03/2019     Priority: Medium     AIDP (acute inflammatory demyelinating polyneuropathy) (H) 05/03/2019     Priority: Medium     Normocytic anemia 05/17/2018     Priority: Medium     Leukocytosis with increased monocytes 05/17/2018     Priority: Medium     Generalized weakness 05/17/2018     Priority: Medium     Tubular adenoma of colon 03/23/2018     Priority: Medium     Collected: 3/18/2018   Received: 3/19/2018   Reported: 3/22/2018 19:19   Ordering Phy(s): SASKIA LEE     For improved result formatting, select 'View Enhanced Report Format' under    Linked Documents section.     SPECIMEN(S):   A: Splenic flexure polyp   B: Rectal polyp     FINAL DIAGNOSIS:   A.  Colon, splenic flexure, mucosal biopsies:   - Tubular adenoma.   - Negative for high-grade dysplasia and malignancy.     B.  Rectum, mucosal biopsy:   - Tubular adenoma.   - Negative for high-grade dysplasia and malignancy.        Peptic ulcer disease 03/16/2018     Priority: Medium     Alcohol dependence (H) 03/16/2018     Priority: Medium     Tobacco dependence syndrome 03/16/2018     Priority: Medium     Mild intermittent asthma without complication 11/13/2017     Priority: Medium     CKD (chronic kidney disease) stage 3, GFR 30-59 ml/min (H) 08/16/2017     Priority: Medium     Rosacea 08/16/2017     Priority: Medium     Sensorineural hearing loss, asymmetrical 03/28/2017     Priority: Medium     Chronic midline low back pain with bilateral sciatica 07/13/2016     Priority: Medium     Overview:   Follows with pain clinic: has chronic neck and low back pain  Per 4/2016 visit:  1. Discussed options and plan with the patient.   Urine toxicology screen 1/7/16 was THC positive and therefore due to his already delicate life balance, we will no longer prescribe opioid medications.  I believe the patient does have pain and plan to continue to  see and treat the patient with conservative pain management options.  Can consider Clonodine for any withdrawel symptoms.  2. Continue pool therapy and working out at [facility] once insurance issues are figured out.  3. Start using TENS unit for right knee pain once it arrives.  4. Start Cymbalta 30mg, one tab daily. #30. Ref 2.  6. Continue Zanaflex as previously prescribed.  7. Continue acupuncture/Chiropractic visits twice a week.  8. RTC 2 months        Thrombocytopenia (H) 11/11/2014     Priority: Medium     Universal ulcerative (chronic) colitis(556.6) (H) 11/11/2014     Priority: Medium     Alcoholic cirrhosis of liver (H) 11/04/2014     Priority: Medium     Coagulation defect (H) 11/04/2014     Priority: Medium     Osteoarthrosis 02/21/2014     Priority: Medium     Essential hypertension 03/28/2011     Priority: Medium     Problem list name updated by automated process. Provider to review       Acute myeloid leukemia in remission (H) 03/28/2011     Priority: Medium     S/p chemo, did not need bone marrow transplant        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)     s/p chemo, no bone marrow transplant     Chronic obstructive pulmonary disease 5/17/2018     COPD (chronic obstructive pulmonary disease) (H)      History of pulmonary embolus (PE)      Hypertension      PUD (peptic ulcer disease)      Tobacco dependence      Ulcerative colitis (H)      Past Surgical History:   Procedure Laterality Date     AS TOTAL KNEE ARTHROPLASTY Left      BONE MARROW BIOPSY, BONE SPECIMEN, NEEDLE/TROCAR N/A 6/12/2018    Procedure: BIOPSY BONE MARROW;  Bone Marrow Biopsy;  Surgeon: Demar Sapp MD;  Location: [facility]     ESOPHAGOSCOPY, GASTROSCOPY, DUODENOSCOPY (EGD), COMBINED N/A 2/8/2018    Procedure: COMBINED ESOPHAGOSCOPY, GASTROSCOPY, DUODENOSCOPY (EGD), BIOPSY  SINGLE OR MULTIPLE;  gastroscopy with biopsies;  Surgeon: Raz Cobb MD;  Location: [facility]     ESOPHAGOSCOPY, GASTROSCOPY, DUODENOSCOPY (EGD), COMBINED N/A 3/18/2018    Procedure: COMBINED ESOPHAGOSCOPY, GASTROSCOPY, DUODENOSCOPY (EGD);;  Surgeon: Raz Cobb MD;  Location: [facility]     ESOPHAGOSCOPY, GASTROSCOPY, DUODENOSCOPY (EGD), COMBINED N/A 2/28/2020    Procedure: ESOPHAGOGASTRODUODENOSCOPY, WITH BIOPSY;  Surgeon: Chente Mclaughlin DO;  Location: [facility]     LACERATION REPAIR Right     Right leg     PHACOEMULSIFICATION WITH STANDARD INTRAOCULAR LENS IMPLANT Left 7/1/2019    Procedure: cataract removal with implant.;  Surgeon: Adrian Oliveira MD;  Location: [facility]     PHACOEMULSIFICATION WITH STANDARD INTRAOCULAR LENS IMPLANT Right 7/29/2019    Procedure: Cataract removal with implant.;  Surgeon: Adrian Oliveira MD;  Location: [facility]     Current Outpatient Medications   Medication Sig Dispense Refill     acetaminophen 500 MG PO tablet Take 1-2 tablets (500-1,000 mg) by mouth every 6 hours as needed for mild pain Do not take more than 3000mg acetaminophen total in one day. 100 tablet 3     acetaminophen-codeine (TYLENOL #3) 300-30 MG tablet Take 1-2 tablets by mouth daily as needed for severe pain 20 tablet 0     albuterol (VENTOLIN HFA) 108 (90 Base) MCG/ACT inhaler Inhale 2 puffs into the lungs every 4 hours as needed for shortness of breath / dyspnea or wheezing 18 g 11     atorvastatin 20 MG PO tablet Take 1 tablet (20 mg) by mouth daily 90 tablet 3     baclofen (LIORESAL) 10 MG tablet TAKE 1/2 TO 1 TABLET BY MOUTH UP TO THREE TIMES A DAY (Patient taking differently: Take 10 mg by mouth 3 times daily ) 90 tablet 3     buPROPion (WELLBUTRIN SR) 150 MG 12 hr tablet Take once per day for 3 days and then increase to twice per day (Patient taking differently: Take 150 mg by mouth 2 times daily ) 60 tablet 12     diclofenac (VOLTAREN) 1 % topical gel Place 2 g onto the skin 4 times daily as  needed for moderate pain 100 g 3     docusate sodium (COLACE) 100 MG tablet Take 1 tablet (100 mg) by mouth 2 times daily as needed for constipation 60 tablet 3     furosemide (LASIX) 20 MG tablet Take 1 tablet (20 mg) by mouth every morning 90 tablet 3     gabapentin (NEURONTIN) 300 MG capsule Take 2 capsules (600 mg) by mouth 3 times daily 180 capsule 11     loratadine (CLARITIN) 10 MG tablet Take 1 tablet (10 mg) by mouth daily as needed for allergies (Patient taking differently: Take 10 mg by mouth daily ) 30 tablet 11     mometasone-formoterol (DULERA) 200-5 MCG/ACT oral inhaler Inhale 2 puffs into the lungs 2 times daily as needed        montelukast (SINGULAIR) 10 MG tablet Take 1 tablet (10 mg) by mouth At Bedtime 90 tablet 3     nicotine 2 MG MT lozenge Place 1 lozenge (2 mg) inside cheek every hour as needed for smoking cessation 108 lozenge 11     order for DME Equipment being ordered: 4 wheel walker 1 Units 0     order for DME Equipment being ordered: bed pull up bar 1 Units 0     order for DME Equipment being ordered: shower chair 1 Device 0     pantoprazole (PROTONIX) 40 MG EC tablet Take 1 tablet (40 mg) by mouth 2 times daily 120 tablet 0     POTASSIUM CHLORIDE PO Take 10 mEq by mouth every morning        propranolol (INDERAL) 20 MG tablet Take 1 tablet (20 mg) by mouth 2 times daily 180 tablet 3     spironolactone (ALDACTONE) 50 MG tablet Take 50 mg by mouth daily       sulfamethoxazole-trimethoprim (BACTRIM DS) 800-160 MG tablet Take 1 tablet by mouth 2 times daily for 7 days 14 tablet 0     tiotropium (SPIRIVA) 18 MCG capsule Inhale 1 capsule into the lungs daily Using PRN       traZODone (DESYREL) 50 MG tablet Take 1 tablet (50 mg) by mouth At Bedtime 90 tablet 3     order for DME Equipment being ordered: 2 pairs thigh high compression stockings, strength per patient preference 2 Units 1     order for DME Equipment being ordered: Compression Stockings TWO (2) Pairs, 15-20 mm Hg. 2 Package prn      "rifaximin (XIFAXAN) 550 MG TABS tablet Take 1 tablet (550 mg) by mouth 2 times daily 180 tablet 3     OTC products: None, except as noted above    Allergies   Allergen Reactions     Blood Transfusion Related (Informational Only)      Patient has a history of a clinically significant antibody against RBC antigens.  A delay in compatible RBCs may occur.     Famotidine Unknown and Other (See Comments)     Severe abdominal cramps     Cyclobenzaprine Hives     Methocarbamol Other (See Comments)     Made pt's \"face break out\"     Pepcid Cramps     Severe abdominal cramps     Vancomycin Other (See Comments)     hallucinations     Vfend Other (See Comments)     IV - cold sweats, Iron tablet makes him nauseated and stomach ached     Hydrocodone-Acetaminophen Rash      Latex Allergy: NO    Social History     Tobacco Use     Smoking status: Current Every Day Smoker     Packs/day: 0.50     Years: 30.00     Pack years: 15.00     Types: Cigarettes     Smokeless tobacco: Never Used     Tobacco comment: 5 cigarettes a day    Substance Use Topics     Alcohol use: Yes     Comment: Down to 3 beers per day.  Sometimes a cocktail also.     History   Drug Use     Types: Marijuana       REVIEW OF SYSTEMS:   Constitutional, neuro, ENT, endocrine, pulmonary, cardiac, gastrointestinal, genitourinary, musculoskeletal, integument and psychiatric systems are negative, except as otherwise noted.    EXAM:   /64 (BP Location: Left arm, Patient Position: Sitting, Cuff Size: Adult Large)   Pulse 67   Temp 97.2  F (36.2  C) (Tympanic)   Resp 18   Ht 1.702 m (5' 7\")   Wt 86.7 kg (191 lb 3.2 oz)   SpO2 97%   BMI 29.95 kg/m        GENERAL APPEARANCE: healthy, alert and no distress     HENT: normal ear canals and TMs, nose and mouth without ulcers or lesions     NECK: no adenopathy, no asymmetry, masses, or scars     RESP: lungs clear to auscultation - no rales, rhonchi or wheezes     CV: regular rates and rhythm, normal S1 S2, no S3 or S4 " and no murmur, click or rub -     ABDOMEN:  soft, nontender, no HSM or masses and bowel sounds normal     MS: using walker for ambulation       NEURO: mentation intact and speech normal     PSYCH: mentation appears normal. and affect normal/bright     LYMPHATICS: No cervical or supraclavicular nodes     DIAGNOSTICS:     EKG: appears normal, NSR, normal axis, normal intervals, no acute ST/T changes c/w ischemia, no LVH by voltage criteria  Labs Resulted Today:   Results for orders placed or performed in visit on 03/17/20   Hemoglobin     Status: Abnormal   Result Value Ref Range    Hemoglobin 8.1 (L) 13.3 - 17.7 g/dL   Comprehensive metabolic panel     Status: Abnormal   Result Value Ref Range    Sodium 138 133 - 144 mmol/L    Potassium 4.5 3.4 - 5.3 mmol/L    Chloride 108 94 - 109 mmol/L    Carbon Dioxide 22 20 - 32 mmol/L    Anion Gap 8 3 - 14 mmol/L    Glucose 99 70 - 99 mg/dL    Urea Nitrogen 20 7 - 30 mg/dL    Creatinine 2.20 (H) 0.66 - 1.25 mg/dL    GFR Estimate 31 (L) >60 mL/min/[1.73_m2]    GFR Estimate If Black 36 (L) >60 mL/min/[1.73_m2]    Calcium 8.9 8.5 - 10.1 mg/dL    Bilirubin Total 1.1 0.2 - 1.3 mg/dL    Albumin 3.4 3.4 - 5.0 g/dL    Protein Total 7.4 6.8 - 8.8 g/dL    Alkaline Phosphatase 292 (H) 40 - 150 U/L    ALT 19 0 - 70 U/L    AST 32 0 - 45 U/L   Methicillin Resistant Staph Aureus PCR     Status: None    Specimen: Nares   Result Value Ref Range    Specimen Description Nares     Methicillin Resist/Sens S. aureus PCR Negative NEG^Negative       Recent Labs   Lab Test 03/05/20  1019 02/28/20  1357 02/28/20  0533 02/27/20  1411  03/09/19  1001   HGB 8.2* 7.8* 7.3* 5.8*   < > 8.5*   PLT  --   --  90* 129*   < > 167   INR  --   --   --  1.58*  --  1.47*     --  138 135   < > 134   POTASSIUM 4.2  --  4.0 4.6   < > 3.3*   CR 2.10*  --  2.84* 2.94*   < > 2.34*    < > = values in this interval not displayed.        IMPRESSION:   Reason for surgery/procedure: Left hip arthroplasty  Diagnosis/reason  for consult: Pre-op eval    The proposed surgical procedure is considered INTERMEDIATE risk.    REVISED CARDIAC RISK INDEX  The patient has the following serious cardiovascular risks for perioperative complications such as (MI, PE, VFib and 3  AV Block):  Serum Creatinine >2.0 mg/dl  INTERPRETATION: 1 risks: Class II (low risk - 0.9% complication rate)    The patient has the following additional risks for perioperative complications:  No identified additional risks  Alcohol abuse with risk of withdrawal      ICD-10-CM    1. Preop general physical exam  Z01.818 EKG 12-lead complete w/read - Clinics     Hemoglobin     Methicillin Resistant Staph Aureus PCR     docusate sodium (COLACE) 100 MG tablet     Comprehensive metabolic panel   2. Bilateral hip osteonecrosis (H)  M87.9 EKG 12-lead complete w/read - Clinics     Hemoglobin     Methicillin Resistant Staph Aureus PCR     docusate sodium (COLACE) 100 MG tablet     Comprehensive metabolic panel     acetaminophen-codeine (TYLENOL #3) 300-30 MG tablet   3. Mild intermittent asthma without complication  J45.20    4. Alcoholic cirrhosis of liver without ascites (H)  K70.30 Comprehensive metabolic panel   5. CKD (chronic kidney disease) stage 3, GFR 30-59 ml/min (H)  N18.3    6. Essential hypertension  I10 EKG 12-lead complete w/read - Clinics   7. Alcoholic cirrhosis of liver with ascites (H)  K70.31 furosemide (LASIX) 20 MG tablet   8. Anemia due to GI blood loss  D50.0 Hemoglobin   9. Drug-induced constipation  K59.03    10. Bilateral hand pain  M79.641 diclofenac (VOLTAREN) 1 % topical gel    M79.642        RECOMMENDATIONS:       Cardiovascular Risk  Normal stress test done January 2019 and normal EKG today.  DOSHI is explained by his anemia, no further cardiac testing needed.     Pulmonary Risk  Advised smoking cessation.   History of asthma, currently controlled      Anemia  Anemia is currently stable at 8.1.  Will touch base with surgery team to see if they are okay  with proceeding with surgery at this hemoglobin level.  Monitor Hemoglobin postoperatively.      Alcohol abuse- he is currently drinking 4 drinks per day (a reduction from prior).  Will touch base with surgery team to see if they are okay proceeding while he is drinking or if they require a period of abstinence.     --Patient is to take all scheduled medications on the day of surgery    ADDENDUM:  APPROVAL GIVEN- after further discussion with surgery team regarding his anemia and alcohol use, they are okay with proceeding with surgery.        Signed Electronically by: Chidi Valenzuela MD    Copy of this evaluation report is provided to requesting physician.    [facility] Preop Guidelines    Revised Cardiac Risk Index

## 2020-03-17 NOTE — TELEPHONE ENCOUNTER
Left this detailed message on Dr Carlton Huitron's voicemail.  The voicemail prompt identified Dr Jones's staff as the  of this message.  Left our dr line as number for Dr Jones or his staff to return call to.  Zulema Gramajo RN

## 2020-03-18 ENCOUNTER — ALLIED HEALTH/NURSE VISIT (OUTPATIENT)
Dept: AUDIOLOGY | Facility: CLINIC | Age: 60
End: 2020-03-18
Payer: COMMERCIAL

## 2020-03-18 ENCOUNTER — TELEPHONE (OUTPATIENT)
Dept: FAMILY MEDICINE | Facility: CLINIC | Age: 60
End: 2020-03-18

## 2020-03-18 DIAGNOSIS — H90.3 SENSORINEURAL HEARING LOSS, ASYMMETRICAL: Primary | ICD-10-CM

## 2020-03-18 PROCEDURE — 99207 ZZC NO CHARGE LOS: CPT | Performed by: AUDIOLOGIST

## 2020-03-18 PROCEDURE — 92593 HC HEARING AID CHECK, BINAURAL: CPT | Performed by: AUDIOLOGIST

## 2020-03-18 NOTE — TELEPHONE ENCOUNTER
Left message for [facility] to return call to clinic.   - ok to deliver Dr. Nicolas rodriges below.  Fatou ESTRADA RN

## 2020-03-18 NOTE — TELEPHONE ENCOUNTER
Central Prior Authorization Team   Phone: [phone]    PA Initiation    Medication: Diclofenac 1% Gel  Insurance Company: [employer] - Phone [phone] Fax [phone]  Pharmacy Filling the Rx: [facility] [place], [place] - [facility]  Filling Pharmacy Phone: [phone]  Filling Pharmacy Fax: [phone]  Start Date: 3/18/2020

## 2020-03-18 NOTE — TELEPHONE ENCOUNTER
Emma called from [facility] direct number is [phone]. Dr. Jones did speak with the patient about your concerns. He would like to proceed with his surgery. Please addend the preop to reflect that.  Margo Angulo RN

## 2020-03-18 NOTE — PROGRESS NOTES
[facility]        SUBJECTIVE:  Abhijeet Mccauley, 59 year old male comes in to  his repaired 2019 Phonak Bolero B50-CT hearing aids.     OBJECTIVE:  Verified hearing aid functionality. Reattached earmolds to hearing aids. Decreased soft sound gain bilaterally as patient reports fan sounds, furnace etc are too loud.     See chart in the hearing aid room.     ASSESSMENT/PLAN:    Patient will  hearing aids at the specialty clinic ,    [facility], #[id number]

## 2020-03-19 ENCOUNTER — TELEPHONE (OUTPATIENT)
Dept: FAMILY MEDICINE | Facility: CLINIC | Age: 60
End: 2020-03-19

## 2020-03-19 NOTE — TELEPHONE ENCOUNTER
Reason for Call:  Hip pain    Detailed comments: patient is calling and stating that his hip surgery is cancelled and he is in a tremendous amount of pain. Tylenol #3 and diclofenic are just not helping. He would like something stronger. Please advise.    Phone Number Patient can be reached at: Home number on file [phone] (home)    Best Time: any    Can we leave a detailed message on this number? YES   Lia Leal  [facility] [place]       Call taken on 3/19/2020 at 4:46 PM by Lia Romo

## 2020-03-19 NOTE — TELEPHONE ENCOUNTER
Prior Authorization Approval    Authorization Effective Date: 2/17/2020  Authorization Expiration Date: 3/18/2021  Medication: Diclofenac 1% Gel-APPROVED  Approved Dose/Quantity:    Reference #:     Insurance Company: [employer] - Phone [phone] Fax [phone]  Expected CoPay:       CoPay Card Available:      Foundation Assistance Needed:    Which Pharmacy is filling the prescription (Not needed for infusion/clinic administered): [facility] [address]  Pharmacy Notified: Yes  Patient Notified: Yes  **Instructed pharmacy to notify patient when script is ready to /ship.**

## 2020-03-19 NOTE — TELEPHONE ENCOUNTER
Telephone visit scheduled to address pain management.  He has enough pain med to get thru the weekend.    New surgery date to be determined but looking at 4/15/20.    Zulema Gramajo RN

## 2020-03-23 ENCOUNTER — VIRTUAL VISIT (OUTPATIENT)
Dept: FAMILY MEDICINE | Facility: CLINIC | Age: 60
End: 2020-03-23
Payer: COMMERCIAL

## 2020-03-23 DIAGNOSIS — M87.9 BILATERAL HIP OSTEONECROSIS (H): ICD-10-CM

## 2020-03-23 PROCEDURE — 99441 ZZC PHYSICIAN TELEPHONE EVALUATION 5-10 MIN: CPT | Performed by: INTERNAL MEDICINE

## 2020-03-23 NOTE — PROGRESS NOTES
"Abhijeet Mccauley is a 59 year old male who is being evaluated via a billable telephone visit.      The patient has been notified of following:     \"This telephone visit will be conducted via a call between you and your physician/provider. We have found that certain health care needs can be provided without the need for a physical exam.  This service lets us provide the care you need with a short phone conversation.  If a prescription is necessary we can send it directly to your pharmacy.  If lab work is needed we can place an order for that and you can then stop by our lab to have the test done at a later time.    If during the course of the call the physician/provider feels a telephone visit is not appropriate, you will not be charged for this service.\"     Abhijeet Mccauley complains of    Chief Complaint   Patient presents with     Pain     Patient is requesting rx for pain meds for his hip pain. He was given Tylenol with Codeine and he states it \"binds him up really bad\". Hardly able to walk due to the pain. Surgery was postponed.      Medication Reconciliation     Patient states he has been having to take Gabapentin 300mg  - 3 capsules 3 times a day to help with pain        I have reviewed and updated the patient's Past Medical History, Social History, Family History and Medication List.    ALLERGIES  Blood transfusion related (informational only); Famotidine; Cyclobenzaprine; Methocarbamol; Pepcid; Vancomycin; Vfend; and Hydrocodone-acetaminophen      Additional provider notes:     Joint or Musculoskeletal Pain  Chief Complaint   Patient presents with     Pain     Patient is requesting rx for pain meds for his hip pain. He was given Tylenol with Codeine and he states it \"binds him up really bad\". Hardly able to walk due to the pain. Surgery was postponed.      Medication Reconciliation     Patient states he has been having to take Gabapentin 300mg  - 3 capsules 3 times a day to help with pain        Duration " of complaint: ongoing - worse, hard to walk. Would like a different pain med than Tylenol with codeine.  He had a BM this morning, he is taking docusate twice per day.  If he doesn't take this, gets constipation.  He is taking regular Tylenol 1000mg four times per day.  He is taking 900mg TID of gabapentin.              Assessment/Plan:  1. Bilateral hip osteonecrosis (H)    Abhijeet reports pain is no longer controlled with Tylenol #3, though he said this was helping last week.  He is only taking this once per day- we can increase this to BID.  Docusate is helping with constipation, advised him to continue this.  Advised him that alternative opiates would also cause constipation.  He is taking too much Tylenol (given his alcohol use/liver disease) and gabapentin (given his renal function)- advised him to reduce these.     - acetaminophen-codeine (TYLENOL #3) 300-30 MG tablet; Take 1 tablet by mouth 2 times daily as needed for severe pain  Dispense: 40 tablet; Refill: 0    Phone call duration:  8 minutes    Chidi Valenzuela MD

## 2020-03-31 ENCOUNTER — TELEPHONE (OUTPATIENT)
Dept: FAMILY MEDICINE | Facility: CLINIC | Age: 60
End: 2020-03-31

## 2020-03-31 NOTE — TELEPHONE ENCOUNTER
Prior Authorization Retail Medication Request    Medication/Dose: Xifaxin 550mg  ICD code (if different than what is on RX):    Previously Tried and Failed:  Bactrim DS  Rationale:  Patient has used since 3/2019    Insurance Name:  [facility]  Insurance ID:  [id number]      Pharmacy Information (if different than what is on RX)  Name:    Phone:      Thanks,   Leonor Cruz  Certified Pharmacy Technician  [facility]  [phone]

## 2020-04-01 NOTE — TELEPHONE ENCOUNTER
Central Prior Authorization Team   Phone: [phone]      PA Initiation    Medication: Xifaxin 550mg-Initiated  Insurance Company: [employer] - Phone [phone] Fax [phone]  Pharmacy Filling the Rx: [facility] [place], [place] - [address]  Filling Pharmacy Phone: [phone]  Filling Pharmacy Fax:    Start Date: 4/1/2020

## 2020-04-01 NOTE — TELEPHONE ENCOUNTER
Prior Authorization Approval    Authorization Effective Date: 3/2/2020  Authorization Expiration Date: 4/1/2021  Medication: Xifaxin 550mg-APPROVED  Approved Dose/Quantity:   Reference #:     Insurance Company: [employer] - Phone [phone] Fax [phone]  Expected CoPay:       CoPay Card Available:      Foundation Assistance Needed:    Which Pharmacy is filling the prescription (Not needed for infusion/clinic administered): [facility] [place], [place] - [address] [place] [address]  Pharmacy Notified: Yes  Patient Notified: No    Pharmacy will notify patient when medication is ready.

## 2020-04-06 ENCOUNTER — VIRTUAL VISIT (OUTPATIENT)
Dept: FAMILY MEDICINE | Facility: CLINIC | Age: 60
End: 2020-04-06
Payer: COMMERCIAL

## 2020-04-06 ENCOUNTER — TELEPHONE (OUTPATIENT)
Dept: FAMILY MEDICINE | Facility: CLINIC | Age: 60
End: 2020-04-06

## 2020-04-06 DIAGNOSIS — B37.0 THRUSH: Primary | ICD-10-CM

## 2020-04-06 PROCEDURE — 99441 ZZC PHYSICIAN TELEPHONE EVALUATION 5-10 MIN: CPT | Performed by: FAMILY MEDICINE

## 2020-04-06 RX ORDER — NYSTATIN 100000/ML
500000 SUSPENSION, ORAL (FINAL DOSE FORM) ORAL 4 TIMES DAILY
Qty: 200 ML | Refills: 0 | Status: SHIPPED | OUTPATIENT
Start: 2020-04-06 | End: 2020-04-14

## 2020-04-06 NOTE — PROGRESS NOTES
"Subjective     Abhijeet Mccauley is a 59 year old male who is being evaluated via a billable telephone visit.      The patient has been notified of following:     \"This telephone visit will be conducted via a call between you and your physician/provider. We have found that certain health care needs can be provided without the need for a physical exam.  This service lets us provide the care you need with a short phone conversation.  If a prescription is necessary we can send it directly to your pharmacy.  If lab work is needed we can place an order for that and you can then stop by our lab to have the test done at a later time.    If during the course of the call the physician/provider feels a telephone visit is not appropriate, you will not be charged for this service.\"     Patient has given verbal consent for Telephone visit?  Yes    Abhijeet Mccauley complains of   Chief Complaint   Patient presents with     Telephone     Tongue pain       ALLERGIES  Blood transfusion related (informational only); Famotidine; Cyclobenzaprine; Methocarbamol; Pepcid; Vancomycin; Vfend; and Hydrocodone-acetaminophen    - Tingling and stinging on tongue x1 month. He notices this more when eating spicy foods or drinking ginger ale. Area is swollen. He has no breathing or swallowing problems.  - use his Dulera inhaler intermittently. Not on immunosuppression.     Reviewed and updated as needed this visit by Provider       Review of Systems   See above.        Objective     O:  gen: in NAD      Diagnostic Test Results:  Labs reviewed in Epic  none         Assessment/Plan:  1. Thrush  By history, will try empiric antifungal therapy.   - nystatin (MYCOSTATIN) 843531 UNIT/ML suspension; Take 5 mLs (500,000 Units) by mouth 4 times daily for 10 days  Dispense: 200 mL; Refill: 0  Update in 3-4 days if there's no clinical improvement.  Call or My Chart with any questions or concerns.   Return in about 1 week (around 4/13/2020), or if symptoms " worsen or fail to improve.      Phone call duration:  7 minutes    Karen Morelos MD

## 2020-04-06 NOTE — TELEPHONE ENCOUNTER
Reason for Call:  Other prescription    Detailed comments: pt is calling and stating he got a prescription last fall for his tong as it gets swollen and tingly?  I did not see anything for that. Phar is [facility] said he got it from Dr. Valenzuela    Phone Number Patient can be reached at: Home number on file [phone] (home)    Best Time: any    Can we leave a detailed message on this number? YES    Call taken on 4/6/2020 at 2:03 PM by Catherine Russell

## 2020-04-06 NOTE — TELEPHONE ENCOUNTER
Patient has redness and welling to his tongue, reports feels like what he had last fall, patient had Thrush. Patient reports his voice is kind of going in and out. denies any white areas just very red inside the mouth.  Advised telephone visit today with Dr. Morelos, scheduled for 2:40 pm today.

## 2020-04-10 ENCOUNTER — TELEPHONE (OUTPATIENT)
Dept: FAMILY MEDICINE | Facility: CLINIC | Age: 60
End: 2020-04-10

## 2020-04-10 DIAGNOSIS — K70.30 ALCOHOLIC CIRRHOSIS OF LIVER WITHOUT ASCITES (H): ICD-10-CM

## 2020-04-10 DIAGNOSIS — D50.0 ANEMIA DUE TO GI BLOOD LOSS: ICD-10-CM

## 2020-04-10 DIAGNOSIS — M87.9 BILATERAL HIP OSTEONECROSIS (H): Primary | ICD-10-CM

## 2020-04-10 DIAGNOSIS — N18.30 CKD (CHRONIC KIDNEY DISEASE) STAGE 3, GFR 30-59 ML/MIN (H): ICD-10-CM

## 2020-04-10 NOTE — TELEPHONE ENCOUNTER
Reason for Call:  Other appointment    Detailed comments: Patient is calling asking for an appointment. He doesn't remember the appt on Monday 4/6 he would like to have labs done he said he is in so much pain.     Phone Number Patient can be reached at: Home number on [phone]    Best Time: any    Can we leave a detailed message on this number? YES    Call taken on 4/10/2020 at 9:57 AM by Karen Menjviar

## 2020-04-10 NOTE — TELEPHONE ENCOUNTER
Patient is asking for his CMP and Hemoglobin labs for the month.     Patient wants to discuss hip pain with provider. Scheduled for 4/13/20 at 1300 with PCP.     Pended labs if provider agrees necessary for labs in clinic.     Provider please review and advise. Thank you.

## 2020-04-10 NOTE — TELEPHONE ENCOUNTER
I would recommend that he speak to Dr. Valenzuela on Monday and let her decide what labs she wants before he comes in.  She may want something more (ex. X-ray) with the hip pain and this would limit his clinic exposure with his history if it is done all at the same time.  Please inform patient.  Yessica Jose NP on 4/10/2020 at 11:44 AM

## 2020-04-10 NOTE — TELEPHONE ENCOUNTER
Patient is already aware and waiting for Dr. Rivero to comment when she returns. No need to call patient.

## 2020-04-12 NOTE — TELEPHONE ENCOUNTER
Probably would be good to check a hemoglobin since he has required blood transfusions on multiple occasions; order placed.  Will discuss hip pain during visit- no need for further imaging at this point, has known osteonecrosis of the hips.    Chidi Valenzuela MD

## 2020-04-13 DIAGNOSIS — B37.0 THRUSH: ICD-10-CM

## 2020-04-13 DIAGNOSIS — F51.01 PRIMARY INSOMNIA: ICD-10-CM

## 2020-04-13 DIAGNOSIS — D50.0 ANEMIA DUE TO GI BLOOD LOSS: ICD-10-CM

## 2020-04-13 LAB — HGB BLD-MCNC: 9.2 G/DL (ref 13.3–17.7)

## 2020-04-13 PROCEDURE — 36415 COLL VENOUS BLD VENIPUNCTURE: CPT | Performed by: INTERNAL MEDICINE

## 2020-04-13 PROCEDURE — 85018 HEMOGLOBIN: CPT | Performed by: INTERNAL MEDICINE

## 2020-04-13 NOTE — PROGRESS NOTES
"Abhijeet Mccauley is a 59 year old male who is being evaluated via a billable telephone visit.      The patient has been notified of following:     \"This telephone visit will be conducted via a call between you and your physician/provider. We have found that certain health care needs can be provided without the need for a physical exam.  This service lets us provide the care you need with a short phone conversation.  If a prescription is necessary we can send it directly to your pharmacy.  If lab work is needed we can place an order for that and you can then stop by our lab to have the test done at a later time.    Telephone visits are billed at different rates depending on your insurance coverage. During this emergency period, for some insurers they may be billed the same as an in-person visit.  Please reach out to your insurance provider with any questions.    If during the course of the call the physician/provider feels a telephone visit is not appropriate, you will not be charged for this service.\"    Patient has given verbal consent for Telephone visit?  Yes,   PATIENT reports he will not pay anything.     How would you like to obtain your AVS? Mail a copy    Subjective     Abhijeet Mccauley is a 59 year old male who presents to clinic today for the following health issues:  Chief Complaint   Patient presents with     Hip Pain     unbearable pain on the left side. Tylenol 3 causing constipation     Results     wants lab results       I last spoke to Abhijeet via telephone visit on 3/23 for bilateral hip osteonecrosis with his surgery having been postponed due to COVID-19 pandemic: \"Abhijeet reports pain is no longer controlled with Tylenol #3, though he said this was helping last week.  He is only taking this once per day- we can increase this to BID.  Docusate is helping with constipation, advised him to continue this.  Advised him that alternative opiates would also cause constipation.  He is taking too much " "Tylenol (given his alcohol use/liver disease) and gabapentin (given his renal function)- advised him to reduce these.\" \"He is taking regular Tylenol 1000mg four times per day.  He is taking 900mg TID of gabapentin.\"  The gabapentin is prescribed 600mg TID.      Abhijeet spoke to an RN yesterday and reported per her note: \"Patient states he has been drinking a lot more due to the pain. States tylenol #3 has not been working and causing constipation-has been using more colace, states he is doubling up on some days.\"    He had a lab draw yesterday: Hgb improved from 8.1 to 9.2 on 4/13, though he does report today that he is having some spotty bright red blood in the stool.              Medication Followup of Tylenol #3    Taking Medication as prescribed: yes    Side Effects:  None    Medication Helping Symptoms:  NO-not helping at all and making patient constipated. He hasn't had a BM in 3 days despite the docusate.       Abhijeet reports he can't deal with the pain anymore, he is crying in the morning, and this is \"bullshit\".  He states he wants high dose pain medication.  I cautioned him that we can just jump to high doses of narcotics because that could kill him, to which he replies \"I wouldn't mind it\".         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)     CKD (chronic kidney disease) stage 3, GFR 30-59 ml/min (H)     Rosacea     Mild intermittent asthma without complication     Peptic ulcer disease     Alcohol dependence (H)     Tobacco dependence syndrome     Tubular adenoma of colon     Normocytic anemia     Leukocytosis with increased monocytes     Generalized weakness     AIDP (acute inflammatory demyelinating polyneuropathy) (H)     Primary " osteoarthritis of left knee     Intermediate risk for ASCVD (arteriosclerotic cardiovascular disease)     Esophageal varices (H)     Chronic midline low back pain with bilateral sciatica     GI bleed     Past Surgical History:   Procedure Laterality Date     AS TOTAL KNEE ARTHROPLASTY Left      BONE MARROW BIOPSY, BONE SPECIMEN, NEEDLE/TROCAR N/A 6/12/2018    Procedure: BIOPSY BONE MARROW;  Bone Marrow Biopsy;  Surgeon: Demar Sapp MD;  Location: [facility]     ESOPHAGOSCOPY, GASTROSCOPY, DUODENOSCOPY (EGD), COMBINED N/A 2/8/2018    Procedure: COMBINED ESOPHAGOSCOPY, GASTROSCOPY, DUODENOSCOPY (EGD), BIOPSY SINGLE OR MULTIPLE;  gastroscopy with biopsies;  Surgeon: Raz Cobb MD;  Location: [facility]     ESOPHAGOSCOPY, GASTROSCOPY, DUODENOSCOPY (EGD), COMBINED N/A 3/18/2018    Procedure: COMBINED ESOPHAGOSCOPY, GASTROSCOPY, DUODENOSCOPY (EGD);;  Surgeon: Raz Cobb MD;  Location: [facility]     ESOPHAGOSCOPY, GASTROSCOPY, DUODENOSCOPY (EGD), COMBINED N/A 2/28/2020    Procedure: ESOPHAGOGASTRODUODENOSCOPY, WITH BIOPSY;  Surgeon: Chente Mclaughlin DO;  Location: [facility]     LACERATION REPAIR Right     Right leg     PHACOEMULSIFICATION WITH STANDARD INTRAOCULAR LENS IMPLANT Left 7/1/2019    Procedure: cataract removal with implant.;  Surgeon: Adrian Oliveira MD;  Location: [facility]     PHACOEMULSIFICATION WITH STANDARD INTRAOCULAR LENS IMPLANT Right 7/29/2019    Procedure: Cataract removal with implant.;  Surgeon: Adrian Oliveira MD;  Location: [facility]       Social History     Tobacco Use     Smoking status: Current Every Day Smoker     Packs/day: 0.50     Years: 30.00     Pack years: 15.00     Types: Cigarettes     Smokeless tobacco: Never Used     Tobacco comment: 5 cigarettes a day    Substance Use Topics     Alcohol use: Yes     Comment: Down to 3 beers per day.  Sometimes a cocktail also.     Family History   Problem Relation Age of Onset     Hypertension Mother      Coronary Artery  Disease Father         MI         Current Outpatient Medications   Medication Sig Dispense Refill     atorvastatin 20 MG PO tablet Take 1 tablet (20 mg) by mouth daily 90 tablet 3     baclofen (LIORESAL) 10 MG tablet TAKE 1/2 TO 1 TABLET BY MOUTH UP TO THREE TIMES A DAY (Patient taking differently: Take 10 mg by mouth 3 times daily ) 90 tablet 3     buPROPion (WELLBUTRIN SR) 150 MG 12 hr tablet Take once per day for 3 days and then increase to twice per day (Patient taking differently: Take 150 mg by mouth 2 times daily ) 60 tablet 12     docusate sodium (COLACE) 100 MG tablet Take 1 tablet (100 mg) by mouth 2 times daily as needed for constipation 60 tablet 3     furosemide (LASIX) 20 MG tablet Take 1 tablet (20 mg) by mouth every morning 90 tablet 3     gabapentin (NEURONTIN) 300 MG capsule Take 2 capsules (600 mg) by mouth 3 times daily (Patient taking differently: Take 900 mg by mouth 3 times daily ) 180 capsule 11     loratadine (CLARITIN) 10 MG tablet Take 1 tablet (10 mg) by mouth daily as needed for allergies (Patient taking differently: Take 10 mg by mouth daily ) 30 tablet 11     mometasone-formoterol (DULERA) 200-5 MCG/ACT oral inhaler Inhale 2 puffs into the lungs 2 times daily as needed        montelukast (SINGULAIR) 10 MG tablet Take 1 tablet (10 mg) by mouth At Bedtime 90 tablet 3     pantoprazole (PROTONIX) 40 MG EC tablet Take 1 tablet (40 mg) by mouth 2 times daily 120 tablet 0     POTASSIUM CHLORIDE PO Take 10 mEq by mouth every morning        propranolol (INDERAL) 20 MG tablet Take 1 tablet (20 mg) by mouth 2 times daily 180 tablet 3     spironolactone (ALDACTONE) 50 MG tablet Take 50 mg by mouth daily       traZODone (DESYREL) 50 MG tablet Take 1 tablet (50 mg) by mouth At Bedtime 90 tablet 3     acetaminophen 500 MG PO tablet Take 1-2 tablets (500-1,000 mg) by mouth every 6 hours as needed for mild pain Do not take more than 3000mg acetaminophen total in one day. 100 tablet 3     albuterol  "(VENTOLIN HFA) 108 (90 Base) MCG/ACT inhaler Inhale 2 puffs into the lungs every 4 hours as needed for shortness of breath / dyspnea or wheezing 18 g 11     diclofenac (VOLTAREN) 1 % topical gel Place 2 g onto the skin 4 times daily as needed for moderate pain 100 g 3     nicotine 2 MG MT lozenge Place 1 lozenge (2 mg) inside cheek every hour as needed for smoking cessation 108 lozenge 11     nystatin (MYCOSTATIN) 384389 UNIT/ML suspension TAKE 5 MLS BY MOUTH FOUR TIMES A DAY FOR 10 DAYS 200 mL 0     order for DME Equipment being ordered: 4 wheel walker 1 Units 0     order for DME Equipment being ordered: 2 pairs thigh high compression stockings, strength per patient preference 2 Units 1     order for DME Equipment being ordered: Compression Stockings TWO (2) Pairs, 15-20 mm Hg. 2 Package prn     order for DME Equipment being ordered: bed pull up bar 1 Units 0     order for DME Equipment being ordered: shower chair 1 Device 0     rifaximin (XIFAXAN) 550 MG TABS tablet Take 1 tablet (550 mg) by mouth 2 times daily 180 tablet 3     tiotropium (SPIRIVA) 18 MCG capsule Inhale 1 capsule into the lungs daily Using PRN       Allergies   Allergen Reactions     Blood Transfusion Related (Informational Only)      Patient has a history of a clinically significant antibody against RBC antigens.  A delay in compatible RBCs may occur.     Famotidine Unknown and Other (See Comments)     Severe abdominal cramps     Cyclobenzaprine Hives     Methocarbamol Other (See Comments)     Made pt's \"face break out\"     Pepcid Cramps     Severe abdominal cramps     Vancomycin Other (See Comments)     hallucinations     Vfend Other (See Comments)     IV - cold sweats, Iron tablet makes him nauseated and stomach ached     Hydrocodone-Acetaminophen Rash       Reviewed and updated as needed this visit by Provider         Review of Systems   ROS COMP: Constitutional, MSK systems are negative, except as otherwise noted.       Objective   Reported " vitals:  There were no vitals taken for this visit.   alert and somewhat verbally aggressive   PSYCH: Alert and oriented; His affect is angry  RESP: No cough, no audible wheezing, able to talk in full sentences  Remainder of exam unable to be completed due to telephone visits    Diagnostic Test Results:  Labs reviewed in Epic        Assessment/Plan:  1. Bilateral hip osteonecrosis (H)    Abhijeet's pain is not controlled on the Tylenol #3 and he would like to try something else.  Will try switching over to tramadol.  Anticipate his surgery is going to be delayed for awhile longer, so I gave him an prescription intended to last for one month.  Follow-up in 1 month to see how things are going.     - traMADol (ULTRAM) 50 MG tablet; Take 1 tablet (50 mg) by mouth every 6 hours as needed for severe pain Max 4 per day, prescription to last at least 1 month  Dispense: 120 tablet; Refill: 0    2. Drug-induced constipation    Not controlled on docusate, will try adding in some Miralax.     - polyethylene glycol (MIRALAX) 17 g packet; Take 17 g by mouth daily  Dispense: 30 packet; Refill: 11    No follow-ups on file.      Phone call duration:  7 minutes    Chidi Valenzuela MD

## 2020-04-13 NOTE — TELEPHONE ENCOUNTER
"Requested Prescriptions   Pending Prescriptions Disp Refills     traZODone (DESYREL) 50 MG tablet [Pharmacy Med Name: TRAZODONE HCL 50MG TABS]  Last Written Prescription Date:  5/2/19  Last Fill Quantity: 90,  # refills: 3   Last Office Visit with [facility], [facility] or [facility] prescribing provider:  4/6/20   Future Office Visit:    Next 5 appointments (look out 90 days)    Apr 14, 2020  2:40 PM CDT  Telephone Visit with Chidi Valenzuela MD  [facility]  Arrive at: [facility] [address] [address]  [phone]          90 tablet 3     Sig: TAKE ONE TABLET BY MOUTH ONCE DAILY AT BEDTIME       Serotonin Modulators Passed - 4/13/2020  8:07 AM        Passed - Recent (12 mo) or future (30 days) visit within the authorizing provider's specialty     Patient has had an office visit with the authorizing provider or a provider within the authorizing providers department within the previous 12 mos or has a future within next 30 days. See \"Patient Info\" tab in inbasket, or \"Choose Columns\" in Meds & Orders section of the refill encounter.              Passed - Medication is active on med list        Passed - Patient is age 18 or older             "

## 2020-04-13 NOTE — TELEPHONE ENCOUNTER
Patient states he has been drinking a lot more due to the pain. States tylenol #3 has not been working and causing constipation-has been using more colace, states he is doubling up on some days.      LEONARD ThaoN, RN

## 2020-04-14 ENCOUNTER — VIRTUAL VISIT (OUTPATIENT)
Dept: FAMILY MEDICINE | Facility: CLINIC | Age: 60
End: 2020-04-14
Payer: COMMERCIAL

## 2020-04-14 ENCOUNTER — TELEPHONE (OUTPATIENT)
Dept: FAMILY MEDICINE | Facility: CLINIC | Age: 60
End: 2020-04-14

## 2020-04-14 ENCOUNTER — TELEPHONE (OUTPATIENT)
Dept: GASTROENTEROLOGY | Facility: CLINIC | Age: 60
End: 2020-04-14

## 2020-04-14 DIAGNOSIS — M87.9 BILATERAL HIP OSTEONECROSIS (H): ICD-10-CM

## 2020-04-14 DIAGNOSIS — K59.03 DRUG-INDUCED CONSTIPATION: Primary | ICD-10-CM

## 2020-04-14 DIAGNOSIS — R30.0 DYSURIA: Primary | ICD-10-CM

## 2020-04-14 PROCEDURE — 99214 OFFICE O/P EST MOD 30 MIN: CPT | Mod: 95 | Performed by: INTERNAL MEDICINE

## 2020-04-14 RX ORDER — TRAMADOL HYDROCHLORIDE 50 MG/1
50 TABLET ORAL EVERY 6 HOURS PRN
Qty: 120 TABLET | Refills: 0 | Status: SHIPPED | OUTPATIENT
Start: 2020-04-14 | End: 2020-05-27

## 2020-04-14 RX ORDER — NYSTATIN 100000/ML
SUSPENSION, ORAL (FINAL DOSE FORM) ORAL
Qty: 200 ML | Refills: 0 | Status: ON HOLD | OUTPATIENT
Start: 2020-04-14 | End: 2020-06-16

## 2020-04-14 RX ORDER — POLYETHYLENE GLYCOL 3350 17 G/17G
1 POWDER, FOR SOLUTION ORAL DAILY
Qty: 30 PACKET | Refills: 11 | Status: ON HOLD | OUTPATIENT
Start: 2020-04-14 | End: 2020-06-25

## 2020-04-14 RX ORDER — TRAZODONE HYDROCHLORIDE 50 MG/1
TABLET, FILM COATED ORAL
Qty: 90 TABLET | Refills: 3 | Status: ON HOLD | OUTPATIENT
Start: 2020-04-14 | End: 2020-06-25

## 2020-04-14 NOTE — TELEPHONE ENCOUNTER
What symptoms is he having?  Since we just had a visit, if symptoms are consistent (dysuria, hematuria, increased frequency, urgency, fevers/chills, pelvic pain), then I may just order a U/A to be done without another visit scheduled.    Thanks  Chidi Valenzuela MD

## 2020-04-14 NOTE — PATIENT INSTRUCTIONS
Try the tramadol to see if that is more effective for your pain.  You can continue to take regular Tylenol in addition to this, up to 3000mg total per day.    Continue the docusate and also add in some Miralax to see if that helps with your constipation.    Hemoglobin was 9.2 yesterday

## 2020-04-14 NOTE — TELEPHONE ENCOUNTER
I called patient to discuss symptoms further.  He reports he feels like the urine infection is returning because he is having milky urine and having trouble initiating urination.  Having some mild dysuria.  No hematuria.  Has some increased frequency.  No pelvic pain, hasn't noticed flank pain.  Slight chills, no fevers.     Since his urine culture was negative last month, I do recommend checking a U/A to see if this is related to infection.  Alternately could have something like BPH.  He is reluctant to return to lab, but agrees with my reasoning and will schedule a lab appointment.  U/A order placed.    Chidi Valenzuela MD

## 2020-04-14 NOTE — TELEPHONE ENCOUNTER
Call patient to see how he is doing.    Chelsea Ortiz RN  Gastroenterology Care Coordinator  [place], [place]

## 2020-04-14 NOTE — TELEPHONE ENCOUNTER
Looks like pt already had a virtual visit today; does he need to schedule another one to address this concern?    Thanks,     Angelina HDZ RN, BSN       [phone]

## 2020-04-14 NOTE — TELEPHONE ENCOUNTER
Reason for Call:  Other call back    Detailed comments: Patient forgot to ask  about his UTI he thinks he has one again.    Phone Number Patient can be reached at: Home number on file [phone] (home)    Best Time: any    Can we leave a detailed message on this number? YES    Call taken on 4/14/2020 at 4:10 PM by Karen Menjivar

## 2020-04-21 ENCOUNTER — TELEPHONE (OUTPATIENT)
Dept: FAMILY MEDICINE | Facility: CLINIC | Age: 60
End: 2020-04-21

## 2020-04-21 NOTE — TELEPHONE ENCOUNTER
Reason for call:  Patient reporting a symptom    Symptom or request: Pt did a virtual visit with Dr. Valenzuela 4/14 and did not bring in a urine specimen and he does not plan to do so in the future.  He states that he is still having UTI symptoms and wants Dr. Valenzuela to send an Rx for an antibiotic to his pharmacy.  Please call patient and advise.    [facility]    Duration (how long have symptoms been present): ongoing    Have you been treated for this before? Yes    Additional comments:     Phone Number patient can be reached at:  Cell number on file:    Telephone Information:   Mobile [phone]       Best Time:  any    Can we leave a detailed message on this number:  YES    Call taken on 4/21/2020 at 7:17 AM by Nenita Maria

## 2020-04-21 NOTE — TELEPHONE ENCOUNTER
Patient told of the need for a urine sample.  The patient needs an outpatient lab appt to give a UA also. Patient states he can not urinate.  I have advised he go to the ER if unable to urinate to have a catheter placed.  Then patient states he can urinate. Patient asks why he needs a UA.  This is explained to him.  Good medicine, to ensure we are treating with the correct medication.  To help reduce the chance of antibiotic resistant bacteria.  Patient states he understands.  Catherine WANG RN

## 2020-04-22 ENCOUNTER — HOSPITAL ENCOUNTER (OUTPATIENT)
Facility: CLINIC | Age: 60
End: 2020-04-22
Attending: ORTHOPAEDIC SURGERY | Admitting: ORTHOPAEDIC SURGERY
Payer: COMMERCIAL

## 2020-04-22 ENCOUNTER — TELEPHONE (OUTPATIENT)
Dept: FAMILY MEDICINE | Facility: CLINIC | Age: 60
End: 2020-04-22

## 2020-04-23 ENCOUNTER — OFFICE VISIT (OUTPATIENT)
Dept: FAMILY MEDICINE | Facility: CLINIC | Age: 60
End: 2020-04-23
Payer: COMMERCIAL

## 2020-04-23 ENCOUNTER — TELEPHONE (OUTPATIENT)
Dept: FAMILY MEDICINE | Facility: CLINIC | Age: 60
End: 2020-04-23

## 2020-04-23 DIAGNOSIS — N30.00 ACUTE CYSTITIS WITHOUT HEMATURIA: ICD-10-CM

## 2020-04-23 DIAGNOSIS — M16.11 PRIMARY OSTEOARTHRITIS OF RIGHT HIP: ICD-10-CM

## 2020-04-23 DIAGNOSIS — K70.30 ALCOHOLIC CIRRHOSIS OF LIVER WITHOUT ASCITES (H): ICD-10-CM

## 2020-04-23 DIAGNOSIS — D72.829 LEUKOCYTOSIS, UNSPECIFIED TYPE: ICD-10-CM

## 2020-04-23 DIAGNOSIS — I10 ESSENTIAL HYPERTENSION: ICD-10-CM

## 2020-04-23 DIAGNOSIS — N18.30 CKD (CHRONIC KIDNEY DISEASE) STAGE 3, GFR 30-59 ML/MIN (H): ICD-10-CM

## 2020-04-23 DIAGNOSIS — Z87.19 HISTORY OF GI BLEED: ICD-10-CM

## 2020-04-23 DIAGNOSIS — Z01.818 PREOP GENERAL PHYSICAL EXAM: Primary | ICD-10-CM

## 2020-04-23 DIAGNOSIS — R30.0 DYSURIA: ICD-10-CM

## 2020-04-23 DIAGNOSIS — D69.6 THROMBOCYTOPENIA (H): ICD-10-CM

## 2020-04-23 LAB
ALBUMIN SERPL-MCNC: 3.6 G/DL (ref 3.4–5)
ALP SERPL-CCNC: 326 U/L (ref 40–150)
ALT SERPL W P-5'-P-CCNC: 20 U/L (ref 0–70)
ANION GAP SERPL CALCULATED.3IONS-SCNC: 8 MMOL/L (ref 3–14)
AST SERPL W P-5'-P-CCNC: 32 U/L (ref 0–45)
BASOPHILS # BLD AUTO: 0.1 10E9/L (ref 0–0.2)
BASOPHILS NFR BLD AUTO: 0.4 %
BILIRUB SERPL-MCNC: 1.3 MG/DL (ref 0.2–1.3)
BUN SERPL-MCNC: 31 MG/DL (ref 7–30)
CALCIUM SERPL-MCNC: 10.2 MG/DL (ref 8.5–10.1)
CHLORIDE SERPL-SCNC: 104 MMOL/L (ref 94–109)
CO2 SERPL-SCNC: 21 MMOL/L (ref 20–32)
CREAT SERPL-MCNC: 2.29 MG/DL (ref 0.66–1.25)
DIFFERENTIAL METHOD BLD: ABNORMAL
EOSINOPHIL # BLD AUTO: 0.4 10E9/L (ref 0–0.7)
EOSINOPHIL NFR BLD AUTO: 1.8 %
ERYTHROCYTE [DISTWIDTH] IN BLOOD BY AUTOMATED COUNT: 23.9 % (ref 10–15)
GFR SERPL CREATININE-BSD FRML MDRD: 30 ML/MIN/{1.73_M2}
GLUCOSE SERPL-MCNC: 80 MG/DL (ref 70–99)
HCT VFR BLD AUTO: 29.7 % (ref 40–53)
HGB BLD-MCNC: 8.6 G/DL (ref 13.3–17.7)
IMM GRANULOCYTES # BLD: 0.1 10E9/L (ref 0–0.4)
IMM GRANULOCYTES NFR BLD: 0.7 %
LYMPHOCYTES # BLD AUTO: 1.5 10E9/L (ref 0.8–5.3)
LYMPHOCYTES NFR BLD AUTO: 7.9 %
MCH RBC QN AUTO: 26.8 PG (ref 26.5–33)
MCHC RBC AUTO-ENTMCNC: 29 G/DL (ref 31.5–36.5)
MCV RBC AUTO: 93 FL (ref 78–100)
MONOCYTES # BLD AUTO: 3.3 10E9/L (ref 0–1.3)
MONOCYTES NFR BLD AUTO: 16.8 %
MRSA DNA SPEC QL NAA+PROBE: NEGATIVE
NEUTROPHILS # BLD AUTO: 14.1 10E9/L (ref 1.6–8.3)
NEUTROPHILS NFR BLD AUTO: 72.4 %
NRBC # BLD AUTO: 0 10*3/UL
NRBC BLD AUTO-RTO: 0 /100
PLATELET # BLD AUTO: 132 10E9/L (ref 150–450)
POTASSIUM SERPL-SCNC: 4 MMOL/L (ref 3.4–5.3)
PROT SERPL-MCNC: 7.8 G/DL (ref 6.8–8.8)
RBC # BLD AUTO: 3.21 10E12/L (ref 4.4–5.9)
SODIUM SERPL-SCNC: 133 MMOL/L (ref 133–144)
SPECIMEN SOURCE: NORMAL
WBC # BLD AUTO: 19.5 10E9/L (ref 4–11)

## 2020-04-23 PROCEDURE — 87640 STAPH A DNA AMP PROBE: CPT | Performed by: NURSE PRACTITIONER

## 2020-04-23 PROCEDURE — 36415 COLL VENOUS BLD VENIPUNCTURE: CPT | Performed by: NURSE PRACTITIONER

## 2020-04-23 PROCEDURE — 93000 ELECTROCARDIOGRAM COMPLETE: CPT | Performed by: NURSE PRACTITIONER

## 2020-04-23 PROCEDURE — 87641 MR-STAPH DNA AMP PROBE: CPT | Mod: 59 | Performed by: NURSE PRACTITIONER

## 2020-04-23 PROCEDURE — 99215 OFFICE O/P EST HI 40 MIN: CPT | Performed by: NURSE PRACTITIONER

## 2020-04-23 PROCEDURE — 85025 COMPLETE CBC W/AUTO DIFF WBC: CPT | Performed by: NURSE PRACTITIONER

## 2020-04-23 PROCEDURE — 80053 COMPREHEN METABOLIC PANEL: CPT | Performed by: NURSE PRACTITIONER

## 2020-04-23 RX ORDER — CEFAZOLIN SODIUM 2 G/100ML
2 INJECTION, SOLUTION INTRAVENOUS
Status: CANCELLED | OUTPATIENT
Start: 2020-04-23

## 2020-04-23 RX ORDER — CEFAZOLIN SODIUM 1 G/3ML
1 INJECTION, POWDER, FOR SOLUTION INTRAMUSCULAR; INTRAVENOUS SEE ADMIN INSTRUCTIONS
Status: CANCELLED | OUTPATIENT
Start: 2020-04-23

## 2020-04-23 RX ORDER — TRANEXAMIC ACID 10 MG/ML
1 INJECTION, SOLUTION INTRAVENOUS
Status: CANCELLED | OUTPATIENT
Start: 2020-04-23

## 2020-04-23 ASSESSMENT — MIFFLIN-ST. JEOR: SCORE: 1596.43

## 2020-04-23 NOTE — PROGRESS NOTES
[facility]  [address] [address]  [phone]  Dept: [phone]    PRE-OP EVALUATION:  Today's date: 2020    Abhijeet Mccauley (: 1960) presents for pre-operative evaluation assessment as requested by Dr. Jones He requires evaluation and anesthesia risk assessment prior to undergoing surgery/procedure for treatment of total hip arthroplasty     Proposed Surgery/ Procedure:total right hip arthroplasty   Date of Surgery/ Procedure: 20   Time of Surgery/ Procedure: 9:30 AM   Hospital/Surgical Facility: [facility]   Primary Physician: Chidi Valenzuela  Type of Anesthesia Anticipated: Spinal    Patient has a Health Care Directive or Living Will:  NO    1. NO - Do you have a history of heart attack, stroke, stent, bypass or surgery on an artery in the head, neck, heart or legs?  2. NO - Do you ever have any pain or discomfort in your chest?  3. NO - Do you have a history of  Heart Failure?  4. YES - Are you troubled by shortness of breath when: walking on the level, up a slight hill or at night? Chronic  5. NO - Do you currently have a cold, bronchitis or other respiratory infection?  6. NO - Do you have a cough, shortness of breath or wheezing?  7. YES- Do you sometimes get pains in the calves of your legs when you walk? Due to back pain and hip pain  8. NO - Do you or anyone in your family have previous history of blood clots?  9. NO - Do you or does anyone in your family have a serious bleeding problem such as prolonged bleeding following surgeries or cuts?  10. YES Have you ever had problems with anemia or been told to take iron pills?  11. YES - Have you had any abnormal blood loss such as black, tarry or bloody stools, or abnormal vaginal bleeding?  12. YES- Have you ever had a blood transfusion?  13. NO - Have you or any of your relatives ever had problems with anesthesia?  14. NO - Do you have sleep apnea, excessive snoring or daytime drowsiness?  15. NO - Do  you have any prosthetic heart valves?  16. YES - Do you have prosthetic joints? Left knee  17. NO - Is there any chance that you may be pregnant?      HPI:     HPI related to upcoming procedure: the patient with chronic right hip pain due to osteoarthritis scheduled for total right hip arthroplasty       ANEMIA - Patient has a recent history of chronic moderate anemia, which has not been symptomatic. Work up to date has revealed  Hb ranging from 8.1 to 9.2  Treatment has been monitoring. He is being managed for anemia due to recurrent GI bleeding.  Was hospitalized in February 2020 for anemia as outpatient transfusion was not able to be arranged.  He has a colonoscopy performed recently without source of bleeding found. EGD was recently done-no active bleeding. He reports no blood in the stools and denies melena.     RENAL INSUFFICIENCY - Patient has a longstanding history of moderate-severe chronic renal insufficiency. Last Cr 2.29, baseline creatinine level 1.9-2.10    History of alcohol abuse, reports he is not drinking currently.     Today he complains of pelvic discomfort and dysuria, he was unable to provide UA during visit and was advised to schedule lab appointment tomorrow for urine sample.       MEDICAL HISTORY:     Patient Active Problem List    Diagnosis Date Noted     Esophageal varices (H) 02/27/2020     Priority: Medium     Overview:   On propanolol       GI bleed 02/27/2020     Priority: Medium     Intermediate risk for ASCVD (arteriosclerotic cardiovascular disease) 02/24/2020     Priority: Medium     Primary osteoarthritis of left knee 10/03/2019     Priority: Medium     AIDP (acute inflammatory demyelinating polyneuropathy) (H) 05/03/2019     Priority: Medium     Normocytic anemia 05/17/2018     Priority: Medium     Leukocytosis with increased monocytes 05/17/2018     Priority: Medium     Generalized weakness 05/17/2018     Priority: Medium     Tubular adenoma of colon 03/23/2018     Priority:  Medium     Collected: 3/18/2018   Received: 3/19/2018   Reported: 3/22/2018 19:19   Ordering Phy(s): SASKIA LEE     For improved result formatting, select 'View Enhanced Report Format' under    Linked Documents section.     SPECIMEN(S):   A: Splenic flexure polyp   B: Rectal polyp     FINAL DIAGNOSIS:   A.  Colon, splenic flexure, mucosal biopsies:   - Tubular adenoma.   - Negative for high-grade dysplasia and malignancy.     B.  Rectum, mucosal biopsy:   - Tubular adenoma.   - Negative for high-grade dysplasia and malignancy.        Peptic ulcer disease 03/16/2018     Priority: Medium     Alcohol dependence (H) 03/16/2018     Priority: Medium     Tobacco dependence syndrome 03/16/2018     Priority: Medium     Mild intermittent asthma without complication 11/13/2017     Priority: Medium     CKD (chronic kidney disease) stage 3, GFR 30-59 ml/min (H) 08/16/2017     Priority: Medium     Rosacea 08/16/2017     Priority: Medium     Sensorineural hearing loss, asymmetrical 03/28/2017     Priority: Medium     Chronic midline low back pain with bilateral sciatica 07/13/2016     Priority: Medium     Overview:   Follows with pain clinic: has chronic neck and low back pain  Per 4/2016 visit:  1. Discussed options and plan with the patient.   Urine toxicology screen 1/7/16 was THC positive and therefore due to his already delicate life balance, we will no longer prescribe opioid medications.  I believe the patient does have pain and plan to continue to see and treat the patient with conservative pain management options.  Can consider Clonodine for any withdrawel symptoms.  2. Continue pool therapy and working out at [facility] once insurance issues are figured out.  3. Start using TENS unit for right knee pain once it arrives.  4. Start Cymbalta 30mg, one tab daily. #30. Ref 2.  6. Continue Zanaflex as previously prescribed.  7. Continue acupuncture/Chiropractic visits twice a week.  8. RTC 2 months        Thrombocytopenia (H)  11/11/2014     Priority: Medium     Universal ulcerative (chronic) colitis(556.6) (H) 11/11/2014     Priority: Medium     Alcoholic cirrhosis of liver (H) 11/04/2014     Priority: Medium     Coagulation defect (H) 11/04/2014     Priority: Medium     Osteoarthrosis 02/21/2014     Priority: Medium     Essential hypertension 03/28/2011     Priority: Medium     Problem list name updated by automated process. Provider to review       Acute myeloid leukemia in remission (H) 03/28/2011     Priority: Medium     S/p chemo, did not need bone marrow transplant        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)     s/p chemo, no bone marrow transplant     Chronic obstructive pulmonary disease 5/17/2018     COPD (chronic obstructive pulmonary disease) (H)      History of pulmonary embolus (PE)      Hypertension      PUD (peptic ulcer disease)      Tobacco dependence      Ulcerative colitis (H)      Past Surgical History:   Procedure Laterality Date     AS TOTAL KNEE ARTHROPLASTY Left      BONE MARROW BIOPSY, BONE SPECIMEN, NEEDLE/TROCAR N/A 6/12/2018    Procedure: BIOPSY BONE MARROW;  Bone Marrow Biopsy;  Surgeon: Demar Sapp MD;  Location: [facility]     ESOPHAGOSCOPY, GASTROSCOPY, DUODENOSCOPY (EGD), COMBINED N/A 2/8/2018    Procedure: COMBINED ESOPHAGOSCOPY, GASTROSCOPY, DUODENOSCOPY (EGD), BIOPSY SINGLE OR MULTIPLE;  gastroscopy with biopsies;  Surgeon: Raz Cobb MD;  Location: [facility]     ESOPHAGOSCOPY, GASTROSCOPY, DUODENOSCOPY (EGD), COMBINED N/A 3/18/2018    Procedure: COMBINED ESOPHAGOSCOPY, GASTROSCOPY, DUODENOSCOPY (EGD);;  Surgeon: Raz Cobb MD;  Location: [facility]     ESOPHAGOSCOPY, GASTROSCOPY, DUODENOSCOPY (EGD), COMBINED N/A 2/28/2020    Procedure: ESOPHAGOGASTRODUODENOSCOPY, WITH BIOPSY;  Surgeon: Chente Mclaughlin DO;  [facility]      LACERATION REPAIR Right     Right leg     PHACOEMULSIFICATION WITH STANDARD INTRAOCULAR LENS IMPLANT Left 7/1/2019    Procedure: cataract removal with implant.;  Surgeon: Adrian Oliveira MD;  Location: [facility]     PHACOEMULSIFICATION WITH STANDARD INTRAOCULAR LENS IMPLANT Right 7/29/2019    Procedure: Cataract removal with implant.;  Surgeon: Adrian Oliveira MD;  Location: [facility]     Current Outpatient Medications   Medication Sig Dispense Refill     atorvastatin 20 MG PO tablet Take 1 tablet (20 mg) by mouth daily 90 tablet 3     baclofen (LIORESAL) 10 MG tablet TAKE 1/2 TO 1 TABLET BY MOUTH UP TO THREE TIMES A DAY (Patient taking differently: Take 10 mg by mouth 3 times daily ) 90 tablet 3     buPROPion (WELLBUTRIN SR) 150 MG 12 hr tablet Take once per day for 3 days and then increase to twice per day (Patient taking differently: Take 150 mg by mouth 2 times daily ) 60 tablet 12     diclofenac (VOLTAREN) 1 % topical gel Place 2 g onto the skin 4 times daily as needed for moderate pain 100 g 3     docusate sodium (COLACE) 100 MG tablet Take 1 tablet (100 mg) by mouth 2 times daily as needed for constipation 60 tablet 3     furosemide (LASIX) 20 MG tablet Take 1 tablet (20 mg) by mouth every morning 90 tablet 3     gabapentin (NEURONTIN) 300 MG capsule Take 2 capsules (600 mg) by mouth 3 times daily (Patient taking differently: Take 900 mg by mouth 3 times daily ) 180 capsule 11     loratadine (CLARITIN) 10 MG tablet Take 1 tablet (10 mg) by mouth daily as needed for allergies (Patient taking differently: Take 10 mg by mouth daily ) 30 tablet 11     nystatin (MYCOSTATIN) 894055 UNIT/ML suspension TAKE 5 MLS BY MOUTH FOUR TIMES A DAY FOR 10 DAYS 200 mL 0     pantoprazole (PROTONIX) 40 MG EC tablet Take 1 tablet (40 mg) by mouth 2 times daily 120 tablet 0     POTASSIUM CHLORIDE PO Take 10 mEq by mouth every morning        propranolol (INDERAL) 20 MG tablet Take 1 tablet (20 mg) by mouth 2 times daily  180 tablet 3     spironolactone (ALDACTONE) 50 MG tablet Take 50 mg by mouth daily       traMADol (ULTRAM) 50 MG tablet Take 1 tablet (50 mg) by mouth every 6 hours as needed for severe pain Max 4 per day, prescription to last at least 1 month 120 tablet 0     traZODone (DESYREL) 50 MG tablet TAKE ONE TABLET BY MOUTH ONCE DAILY AT BEDTIME 90 tablet 3     acetaminophen 500 MG PO tablet Take 1-2 tablets (500-1,000 mg) by mouth every 6 hours as needed for mild pain Do not take more than 3000mg acetaminophen total in one day. (Patient not taking: Reported on 4/23/2020) 100 tablet 3     albuterol (VENTOLIN HFA) 108 (90 Base) MCG/ACT inhaler Inhale 2 puffs into the lungs every 4 hours as needed for shortness of breath / dyspnea or wheezing (Patient not taking: Reported on 4/23/2020) 18 g 11     mometasone-formoterol (DULERA) 200-5 MCG/ACT oral inhaler Inhale 2 puffs into the lungs 2 times daily as needed        montelukast (SINGULAIR) 10 MG tablet Take 1 tablet (10 mg) by mouth At Bedtime (Patient not taking: Reported on 4/23/2020) 90 tablet 3     nicotine 2 MG MT lozenge Place 1 lozenge (2 mg) inside cheek every hour as needed for smoking cessation (Patient not taking: Reported on 4/23/2020) 108 lozenge 11     order for DME Equipment being ordered: 4 wheel walker 1 Units 0     order for DME Equipment being ordered: 2 pairs thigh high compression stockings, strength per patient preference 2 Units 1     order for DME Equipment being ordered: Compression Stockings TWO (2) Pairs, 15-20 mm Hg. 2 Package prn     order for DME Equipment being ordered: bed pull up bar 1 Units 0     order for DME Equipment being ordered: shower chair 1 Device 0     polyethylene glycol (MIRALAX) 17 g packet Take 17 g by mouth daily (Patient not taking: Reported on 4/23/2020) 30 packet 11     rifaximin (XIFAXAN) 550 MG TABS tablet Take 1 tablet (550 mg) by mouth 2 times daily (Patient not taking: Reported on 4/23/2020) 180 tablet 3     tiotropium  "(SPIRIVA) 18 MCG capsule Inhale 1 capsule into the lungs daily Using PRN       OTC products: none    Allergies   Allergen Reactions     Blood Transfusion Related (Informational Only)      Patient has a history of a clinically significant antibody against RBC antigens.  A delay in compatible RBCs may occur.     Famotidine Unknown and Other (See Comments)     Severe abdominal cramps     Cyclobenzaprine Hives     Methocarbamol Other (See Comments)     Made pt's \"face break out\"     Pepcid Cramps     Severe abdominal cramps     Vancomycin Other (See Comments)     hallucinations     Vfend Other (See Comments)     IV - cold sweats, Iron tablet makes him nauseated and stomach ached     Hydrocodone-Acetaminophen Rash      Latex Allergy: NO    Social History     Tobacco Use     Smoking status: Current Every Day Smoker     Packs/day: 0.50     Years: 30.00     Pack years: 15.00     Types: Cigarettes     Smokeless tobacco: Never Used     Tobacco comment: 5 cigarettes a day    Substance Use Topics     Alcohol use: Yes     Comment: Down to 3 beers per day.  Sometimes a cocktail also.     History   Drug Use     Types: Marijuana       REVIEW OF SYSTEMS:   Constitutional, neuro, ENT, endocrine, pulmonary, cardiac, gastrointestinal, genitourinary, musculoskeletal, integument and psychiatric systems are negative, except as otherwise noted.    EXAM:   /60 (BP Location: Right arm, Patient Position: Sitting, Cuff Size: Adult Regular)   Pulse 75   Temp 98.4  F (36.9  C) (Tympanic)   Resp 18   Ht 1.702 m (5' 7\")   Wt 83.3 kg (183 lb 9.6 oz)   SpO2 98%   BMI 28.76 kg/m      GENERAL APPEARANCE: healthy, alert and no distress     EYES: EOMI,  PERRL     HENT: ear canals and TM's normal and nose and mouth without ulcers or lesions     NECK: no adenopathy, no asymmetry, masses, or scars and thyroid normal to palpation     RESP: lungs clear to auscultation - no rales, rhonchi or wheezes     CV: regular rates and rhythm, normal S1 S2, " no S3 or S4 and no murmur, click or rub     ABDOMEN: soft, non-tender      MS: extremities normal- no gross deformities noted, no evidence of inflammation in joints, FROM in all extremities.     SKIN: no suspicious lesions or rashes     NEURO: Normal strength and tone, sensory exam grossly normal, mentation intact and speech normal     PSYCH: mentation appears normal. and affect normal/bright     LYMPHATICS: No cervical adenopathy    DIAGNOSTICS:     EKG: appears normal, NSR, normal axis, normal intervals, no acute ST/T changes c/w ischemia, no LVH by voltage criteria, unchanged from previous tracings  MRSA/MSSA screening for elective joint replacement surgery  Labs Resulted Today:   Results for orders placed or performed in visit on 04/23/20   Comprehensive metabolic panel (BMP + Alb, Alk Phos, ALT, AST, Total. Bili, TP)     Status: Abnormal   Result Value Ref [facility]    Sodium 133 133 - 144 mmol/L    Potassium 4.0 3.4 - 5.3 mmol/L    Chloride 104 94 - 109 mmol/L    Carbon Dioxide 21 20 - 32 mmol/L    Anion Gap 8 3 - 14 mmol/L    Glucose 80 70 - 99 mg/dL    Urea Nitrogen 31 (H) 7 - 30 mg/dL    Creatinine 2.29 (H) 0.66 - 1.25 mg/dL    GFR Estimate 30 (L) >60 mL/min/[1.73_m2]    GFR Estimate If Black 34 (L) >60 mL/min/[1.73_m2]    Calcium 10.2 (H) 8.5 - 10.1 mg/dL    Bilirubin Total 1.3 0.2 - 1.3 mg/dL    Albumin 3.6 3.4 - 5.0 g/dL    Protein Total 7.8 6.8 - 8.8 g/dL    Alkaline Phosphatase 326 (H) 40 - 150 U/L    ALT 20 0 - 70 U/L    AST 32 0 - 45 U/L   CBC with platelets and differential     Status: Abnormal   Result Value Ref [facility]    WBC 19.5 (H) 4.0 - 11.0 10e9/L    RBC Count 3.21 (L) 4.4 - 5.9 10e12/L    Hemoglobin 8.6 (L) 13.3 - 17.7 g/dL    Hematocrit 29.7 (L) 40.0 - 53.0 %    MCV 93 78 - 100 fl    MCH 26.8 26.5 - 33.0 pg    MCHC 29.0 (L) 31.5 - 36.5 g/dL    RDW 23.9 (H) 10.0 - 15.0 %    Platelet Count 132 (L) 150 - 450 10e9/L    Diff Method Automated Method     % Neutrophils 72.4 %    % Lymphocytes 7.9 %    %  Monocytes 16.8 %    % Eosinophils 1.8 %    % Basophils 0.4 %    % Immature Granulocytes 0.7 %    Nucleated RBCs 0 0 /100    Absolute Neutrophil 14.1 (H) 1.6 - 8.3 10e9/L    Absolute Lymphocytes 1.5 0.8 - 5.3 10e9/L    Absolute Monocytes 3.3 (H) 0.0 - 1.3 10e9/L    Absolute Eosinophils 0.4 0.0 - 0.7 10e9/L    Absolute Basophils 0.1 0.0 - 0.2 10e9/L    Abs Immature Granulocytes 0.1 0 - 0.4 10e9/L    Absolute Nucleated RBC 0.0    Methicillin Resistant Staph Aureus PCR     Status: None    Specimen: Nares   Result Value Ref [facility]    Specimen Description Nares     Methicillin Resist/Sens S. aureus PCR Negative NEG^Negative     Labs Drawn and in Process:   Unresulted Labs Ordered in the Past 30 Days of this Admission     No orders found for last 31 day(s).          Recent Labs   Lab Test 04/13/20  1259 03/17/20  0944 03/05/20  1019  02/28/20  0533 02/27/20  1411  03/09/19  1001   HGB 9.2* 8.1* 8.2*   < > 7.3* 5.8*   < > 8.5*   PLT  --   --   --   --  90* 129*   < > 167   INR  --   --   --   --   --  1.58*  --  1.47*   NA  --  138 135  --  138 135   < > 134   POTASSIUM  --  4.5 4.2  --  4.0 4.6   < > 3.3*   CR  --  2.20* 2.10*  --  2.84* 2.94*   < > 2.34*    < > = values in this interval not displayed.        IMPRESSION:   Reason for surgery/procedure: right hip osteoarthritis   Diagnosis/reason for consult: pre-op evaluation     The proposed surgical procedure is considered INTERMEDIATE risk.    REVISED CARDIAC RISK INDEX  The patient has the following serious cardiovascular risks for perioperative complications such as (MI, PE, VFib and 3  AV Block):  Serum Creatinine >2.0 mg/dl  INTERPRETATION: 2 risks: Class III (moderate risk - 6.6% complication rate)    The patient has the following additional risks for perioperative complications:  No identified additional risks  Significant bleeding history  History of alcohol abuse, currently in remission per patient report     (Z01.818) Preop general physical exam  (primary  encounter diagnosis)  Comment: I told patient that I am unable to clear him for surgery today due to leukocytosis possible UTI  Plan: Comprehensive metabolic panel (BMP + Alb, Alk         Phos, ALT, AST, Total. Bili, TP), CBC with         platelets and differential, EKG 12-lead         complete w/read - Clinics, Methicillin         Resistant Staph Aureus PCR, *UA reflex to         Microscopic and Culture ([facility]         [facility] (except [place] and [place])            (M16.11) Primary osteoarthritis of right hip  Plan: the patient possible have UTI, he still needs to submit UA, his labs are stable except for leukocytosis which can be due to UTI    (K70.30) Alcoholic cirrhosis of liver without ascites (H)  Comment: history of alcohol abuse, currently in remission, labs stable   Plan: Comprehensive metabolic panel (BMP + Alb, Alk         Phos, ALT, AST, Total. Bili, TP)            (I10) Essential hypertension  Comment: well controlled   Plan: Comprehensive metabolic panel (BMP + Alb, Alk         Phos, ALT, AST, Total. Bili, TP), EKG 12-lead         complete w/read - Clinics            (Z87.19) History of GI bleed  Comment: hemoglobin level stable, the patient denies history of recent bleeding, denies blood in stools and melena   Plan: CBC with platelets and differential          (N18.3) CKD (chronic kidney disease) stage 3, GFR 30-59 ml/min (H)  Comment: stable   Plan: Comprehensive metabolic panel (BMP + Alb, Alk         Phos, ALT, AST, Total. Bili, TP)            (D69.6) Thrombocytopenia (H)  Comment: stable  Plan: CBC with platelets and differential            (N30.0) UTI  Comment: UA positive for UTI, start Cipro 250 mg twice daily for 7 days, urine culture pending.  Plan to repeat UA in 1 week  Plan: UA reflex to Microscopic and Culture ([facility]         and [facility] (except [place] and         [place])            (D72.829) Leukocytosis, unspecified type  Comment: due to UTI  Plan: repeat  CBC in 1 week       RECOMMENDATIONS:       Cardiovascular Risk  Performs 4 METs exercise without symptoms (Light housework (dusting, washing dishes)) .       Anemia  Anemia and does not require treatment prior to surgery.  Monitor Hemoglobin postoperatively.      --Patient is to take all scheduled medications on the day of surgery EXCEPT for modifications listed below.    Stop Lasix, Potassium supplement and Spironolactone 24 hrs prior surgery    The patient had to reschedule surgery due to UTI, he was treated, UTI resolved, urine culture from 5/10 normal. He is cleared for surgery. (Addendum, 5/10/2020)    Signed Electronically by: LOPEZ Ramey CNP    Copy of this evaluation report is provided to requesting physician.    [facility] Preop Guidelines    Revised Cardiac Risk Index

## 2020-04-23 NOTE — PATIENT INSTRUCTIONS
Before Your Surgery      Call your surgeon if there is any change in your health. This includes signs of a cold or flu (such as a sore throat, runny nose, cough, rash or fever).    Do not smoke, drink alcohol or take over the counter medicine (unless your surgeon or primary care doctor tells you to) for the 24 hours before and after surgery.    If you take prescribed drugs: Follow your doctor s orders about which medicines to take and which to stop until after surgery.    Eating and drinking prior to surgery: follow the instructions from your surgeon    Take a shower or bath the night before surgery. Use the soap your surgeon gave you to gently clean your skin. If you do not have soap from your surgeon, use your regular soap. Do not shave or scrub the surgery site.  Wear clean pajamas and have clean sheets on your bed.     Stop Lasix, Potassium supplement and Spironolactone 24 hrs prior surgery    Labs and EKG today

## 2020-04-23 NOTE — TELEPHONE ENCOUNTER
Called and informed patient of results, WBC is high and likely he has infection, reminded him to make sure  He comes  To the clinic tmrw to leave a  urine sample, he confirmed that he knows this.     Flora Hayes, CMA

## 2020-04-24 VITALS
RESPIRATION RATE: 18 BRPM | WEIGHT: 183.6 LBS | SYSTOLIC BLOOD PRESSURE: 106 MMHG | OXYGEN SATURATION: 98 % | BODY MASS INDEX: 28.82 KG/M2 | HEART RATE: 75 BPM | TEMPERATURE: 98.4 F | DIASTOLIC BLOOD PRESSURE: 60 MMHG | HEIGHT: 67 IN

## 2020-04-24 DIAGNOSIS — R82.90 NONSPECIFIC FINDING ON EXAMINATION OF URINE: Primary | ICD-10-CM

## 2020-04-24 DIAGNOSIS — R30.0 DYSURIA: ICD-10-CM

## 2020-04-24 DIAGNOSIS — Z01.818 PREOP GENERAL PHYSICAL EXAM: ICD-10-CM

## 2020-04-24 LAB
ALBUMIN UR-MCNC: ABNORMAL MG/DL
APPEARANCE UR: ABNORMAL
BILIRUB UR QL STRIP: NEGATIVE
COLOR UR AUTO: YELLOW
GLUCOSE UR STRIP-MCNC: NEGATIVE MG/DL
HGB UR QL STRIP: ABNORMAL
KETONES UR STRIP-MCNC: NEGATIVE MG/DL
LEUKOCYTE ESTERASE UR QL STRIP: ABNORMAL
NITRATE UR QL: NEGATIVE
PH UR STRIP: 5.5 PH (ref 5–7)
RBC #/AREA URNS AUTO: ABNORMAL /HPF
SOURCE: ABNORMAL
SP GR UR STRIP: 1.01 (ref 1–1.03)
UROBILINOGEN UR STRIP-ACNC: 1 EU/DL (ref 0.2–1)
WBC #/AREA URNS AUTO: >100 /HPF

## 2020-04-24 PROCEDURE — 81001 URINALYSIS AUTO W/SCOPE: CPT | Performed by: NURSE PRACTITIONER

## 2020-04-24 PROCEDURE — 87186 SC STD MICRODIL/AGAR DIL: CPT | Performed by: NURSE PRACTITIONER

## 2020-04-24 PROCEDURE — 87088 URINE BACTERIA CULTURE: CPT | Performed by: NURSE PRACTITIONER

## 2020-04-24 PROCEDURE — 87086 URINE CULTURE/COLONY COUNT: CPT | Performed by: NURSE PRACTITIONER

## 2020-04-24 RX ORDER — CIPROFLOXACIN 250 MG/1
250 TABLET, FILM COATED ORAL 2 TIMES DAILY
Qty: 14 TABLET | Refills: 0 | Status: SHIPPED | OUTPATIENT
Start: 2020-04-24 | End: 2020-05-12

## 2020-04-24 RX ORDER — CIPROFLOXACIN 500 MG/1
500 TABLET, FILM COATED ORAL 2 TIMES DAILY
Qty: 10 TABLET | Refills: 0 | Status: SHIPPED | OUTPATIENT
Start: 2020-04-24 | End: 2020-04-24 | Stop reason: DRUGHIGH

## 2020-04-25 DIAGNOSIS — K21.9 GASTROESOPHAGEAL REFLUX DISEASE WITHOUT ESOPHAGITIS: ICD-10-CM

## 2020-04-25 RX ORDER — PANTOPRAZOLE SODIUM 40 MG/1
40 TABLET, DELAYED RELEASE ORAL 2 TIMES DAILY
Qty: 120 TABLET | Refills: 0 | Status: CANCELLED | OUTPATIENT
Start: 2020-04-25

## 2020-04-27 ENCOUNTER — ANESTHESIA EVENT (OUTPATIENT)
Dept: SURGERY | Facility: CLINIC | Age: 60
End: 2020-04-27

## 2020-04-27 DIAGNOSIS — N30.00 ACUTE CYSTITIS WITHOUT HEMATURIA: Primary | ICD-10-CM

## 2020-04-27 LAB
BACTERIA SPEC CULT: ABNORMAL
Lab: ABNORMAL
SPECIMEN SOURCE: ABNORMAL

## 2020-04-27 RX ORDER — LIDOCAINE 40 MG/G
CREAM TOPICAL
Status: CANCELLED | OUTPATIENT
Start: 2020-04-27

## 2020-04-27 RX ORDER — GABAPENTIN 300 MG/1
300 CAPSULE ORAL ONCE
Status: CANCELLED | OUTPATIENT
Start: 2020-04-27 | End: 2020-04-27

## 2020-04-27 RX ORDER — SODIUM CHLORIDE, SODIUM LACTATE, POTASSIUM CHLORIDE, CALCIUM CHLORIDE 600; 310; 30; 20 MG/100ML; MG/100ML; MG/100ML; MG/100ML
INJECTION, SOLUTION INTRAVENOUS CONTINUOUS
Status: CANCELLED | OUTPATIENT
Start: 2020-04-27

## 2020-04-27 RX ORDER — CELECOXIB 200 MG/1
200 CAPSULE ORAL ONCE
Status: CANCELLED | OUTPATIENT
Start: 2020-04-27 | End: 2020-04-27

## 2020-04-27 RX ORDER — SULFAMETHOXAZOLE/TRIMETHOPRIM 800-160 MG
1 TABLET ORAL 2 TIMES DAILY
Qty: 14 TABLET | Refills: 0 | Status: SHIPPED | OUTPATIENT
Start: 2020-04-27 | End: 2020-05-08

## 2020-04-27 ASSESSMENT — LIFESTYLE VARIABLES: TOBACCO_USE: 1

## 2020-04-27 ASSESSMENT — COPD QUESTIONNAIRES: COPD: 1

## 2020-04-27 NOTE — TELEPHONE ENCOUNTER
"Requested Prescriptions   Pending Prescriptions Disp Refills     pantoprazole (PROTONIX) 40 MG EC tablet 120 tablet 0     Sig: Take 1 tablet (40 mg) by mouth 2 times daily       PPI Protocol Passed - 4/25/2020  1:14 PM        Passed - Not on Clopidogrel (unless Pantoprazole ordered)        Passed - No diagnosis of osteoporosis on record        Passed - Recent (12 mo) or future (30 days) visit within the authorizing provider's specialty     Patient has had an office visit with the authorizing provider or a provider within the authorizing providers department within the previous 12 mos or has a future within next 30 days. See \"Patient Info\" tab in inbasket, or \"Choose Columns\" in Meds & Orders section of the refill encounter.              Passed - Medication is active on med list        Passed - Patient is age 18 or older           Last Written Prescription Date:  2/26/2020  Last Fill Quantity: 120,  # refills: 0   Last office visit: 4/23/2020 with prescribing provider:  Milli   Future Office Visit:      "

## 2020-04-27 NOTE — ANESTHESIA PREPROCEDURE EVALUATION
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)     s/p chemo, no bone marrow transplant     Chronic obstructive pulmonary disease 2018     COPD (chronic obstructive pulmonary disease) (H)      History of pulmonary embolus (PE)      Hypertension      PUD (peptic ulcer disease)      Tobacco dependence      Ulcerative colitis (H)      Past Surgical History:   Procedure Laterality Date     AS TOTAL KNEE ARTHROPLASTY Left      BONE MARROW BIOPSY, BONE SPECIMEN, NEEDLE/TROCAR N/A 2018    Procedure: BIOPSY BONE MARROW;  Bone Marrow Biopsy;  Surgeon: Demar Sapp MD;  Location: [facility]     ESOPHAGOSCOPY, GASTROSCOPY, DUODENOSCOPY (EGD), COMBINED N/A 2018    Procedure: COMBINED ESOPHAGOSCOPY, GASTROSCOPY, DUODENOSCOPY (EGD), BIOPSY SINGLE OR MULTIPLE;  gastroscopy with biopsies;  Surgeon: Raz Cobb MD;  Location: [facility]     ESOPHAGOSCOPY, GASTROSCOPY, DUODENOSCOPY (EGD), COMBINED N/A 3/18/2018    Procedure: COMBINED ESOPHAGOSCOPY, GASTROSCOPY, DUODENOSCOPY (EGD);;  Surgeon: Raz Cobb MD;  Location: [facility]     ESOPHAGOSCOPY, GASTROSCOPY, DUODENOSCOPY (EGD), COMBINED N/A 2020    Procedure: ESOPHAGOGASTRODUODENOSCOPY, WITH BIOPSY;  Surgeon: Chente Mclaughlin DO;  Location: [facility]     LACERATION REPAIR Right     Right leg     PHACOEMULSIFICATION WITH STANDARD INTRAOCULAR LENS IMPLANT Left 2019    Procedure: cataract removal with implant.;  Surgeon: Adrian Oliveira MD;  Location: [facility]     PHACOEMULSIFICATION WITH STANDARD INTRAOCULAR LENS IMPLANT Right 2019    Procedure: Cataract  removal with implant.;  Surgeon: Adrian Oliveira MD;  Location: [facility]       Anesthesia Evaluation     .             ROS/MED HX    ENT/Pulmonary:     (+)tobacco use (Marijuana use ), Current use Intermittent asthma COPD, , . .    Neurologic: Comment: Hearing loss    (+)other neuro AIDP (acute inflammatory demyelinating polyneuropathy)    Cardiovascular:     (+) hypertension----. : . . . :. . Previous cardiac testing date:results:Stress Testdate:1/14/19 results:Order   NM Lexiscan stress test ([id number]) (Order [id number])   Exam Information     Exam Date  Exam Time  Accession #  Performing Department  Results    1/14/19   3:47 PM  [id number]  [facility] Nuclear Medicine       [facility] [employer]    PACS Images      Show images for NM Lexiscan stress test   Study Result     GATED MYOCARDIAL PERFUSION SCINTIGRAPHY WITH INTRAVENOUS PHARMACOLOGIC  VASODILATATION LEXISCAN -ONE DAY STUDY      1/14/2019 3:47 PM ALVA MARISCAL 58 years Male  1960.     Indication/Clinical History: Abnormal EKG, preop clearance     Impression  1.  Myocardial perfusion imaging using single isotope technique  demonstrated no evidence of ischemia or infarction.   2. Gated images demonstrated normal size left ventricle with normal  wall motion.  The left ventricular systolic function is normal with  ejection fraction 70%.  3. Compared to the prior study from  .     Procedure  Pharmacologic stress testing was performed with Lexiscan at a rate of  0.08 mg/ml rapid bolus injection, for 15 seconds, 0.4 mg/5ml  intravenously. Low-level exercise was not performed along with the  vasodilator infusion.  The heart rate was 73 at baseline and komal to  84 beats per minute during the Lexiscan infusion. The rest blood  pressure was 128/70 mmHg and was 124/62 mm Hg during Lexiscan  infusion. The patient experienced shortness of breath  during the  test.     Myocardial perfusion imaging was performed at rest, approximately  45  minutes after the injection intravenously of 10.7 mCi of Tc-99m  Myoview. At peak pharmacologic effect, 10-20 seconds after Lexiscan,   the patient was injected intravenously with 30.9 mCi of  Tc-99m  Myoview. The post-stress tomographic imaging was performed  approximately 60 minutes after stress.     EKG Findings  The resting EKG demonstrated sinus rhythm. The stress EKG demonstrated  no EKG changes suggestive of ischemia.     Tomographic Findings  Overall, the study quality is fair . On the stress images, no  significant perfusion defects seen. On the rest images, no perfusion  defects seen . Gated images demonstrated normal size left ventricle  with normal wall motion. The left ventricular ejection fraction was  calculated to be 70%. TID was absent.     KATHIE LAROSE MD    ECG reviewed date:4/23/20 results:SR date: results:          METS/Exercise Tolerance:     Hematologic:     (+) History of blood clots (hx of PE) Anemia, Other Hematologic Disorder-thrombocytopenia      Musculoskeletal:   (+) arthritis,  other musculoskeletal- LBP with bilat. sciatica       GI/Hepatic: Comment: Hx of ETOH dependence  Hx of esophageal varices  Hx of GI bleed   Hx of PUD  Hx of ulcerative colitis  Hx of tubular adenoma of colon    (+) liver disease,       Renal/Genitourinary:     (+) chronic renal disease,       Endo:         Psychiatric:         Infectious Disease:         Malignancy:   (+) Malignancy History of Lymphoma/Leukemia  Hx of leukemia 2011        Other:                                 Lab Results   Component Value Date    WBC 19.5 (H) 04/23/2020    HGB 8.6 (L) 04/23/2020    HCT 29.7 (L) 04/23/2020     (L) 04/23/2020    CRP 33.8 (H) 03/15/2019    SED 73 (H) 03/15/2019     04/23/2020    POTASSIUM 4.0 04/23/2020    CHLORIDE 104 04/23/2020    CO2 21 04/23/2020    BUN 31 (H) 04/23/2020    CR 2.29 (H) 04/23/2020    GLC 80 04/23/2020    BEE 10.2 (H) 04/23/2020    PHOS 5.1 (H) 12/22/2008    MAG 2.0 03/05/2019  "   ALBUMIN 3.6 04/23/2020    PROTTOTAL 7.8 04/23/2020    ALT 20 04/23/2020    AST 32 04/23/2020    ALKPHOS 326 (H) 04/23/2020    BILITOTAL 1.3 04/23/2020    LIPASE 314 03/05/2019    STEW 34 05/31/2019    PTT 36 03/09/2019    INR 1.58 (H) 02/27/2020    FIBR 323 12/10/2014    TSH 0.94 12/05/2018       Preop Vitals  BP Readings from Last 3 Encounters:   04/23/20 106/60   03/17/20 116/64   03/05/20 106/62    Pulse Readings from Last 3 Encounters:   04/23/20 75   03/17/20 67   03/05/20 72      Resp Readings from Last 3 Encounters:   04/23/20 18   03/17/20 18   03/05/20 16    SpO2 Readings from Last 3 Encounters:   04/23/20 98%   03/17/20 97%   03/05/20 98%      Temp Readings from Last 1 Encounters:   04/23/20 36.9  C (98.4  F) (Tympanic)    Ht Readings from Last 1 Encounters:   04/23/20 1.702 m (5' 7\")      Wt Readings from Last 1 Encounters:   04/23/20 83.3 kg (183 lb 9.6 oz)    Estimated body mass index is 28.76 kg/m  as calculated from the following:    Height as of 4/23/20: 1.702 m (5' 7\").    Weight as of 4/23/20: 83.3 kg (183 lb 9.6 oz).                   Janneth Bonilla, LOPEZ CRNA  "

## 2020-04-28 ENCOUNTER — ANESTHESIA (OUTPATIENT)
Dept: SURGERY | Facility: CLINIC | Age: 60
End: 2020-04-28

## 2020-04-28 ENCOUNTER — TELEPHONE (OUTPATIENT)
Dept: FAMILY MEDICINE | Facility: CLINIC | Age: 60
End: 2020-04-28

## 2020-04-28 DIAGNOSIS — K21.9 GASTROESOPHAGEAL REFLUX DISEASE WITHOUT ESOPHAGITIS: ICD-10-CM

## 2020-04-28 DIAGNOSIS — M15.0 PRIMARY OSTEOARTHRITIS INVOLVING MULTIPLE JOINTS: Primary | ICD-10-CM

## 2020-04-28 RX ORDER — TROLAMINE SALICYLATE 10 G/100G
CREAM TOPICAL PRN
Qty: 80 G | Refills: 1 | Status: ON HOLD | OUTPATIENT
Start: 2020-04-28 | End: 2020-06-25

## 2020-04-28 RX ORDER — PANTOPRAZOLE SODIUM 40 MG/1
40 TABLET, DELAYED RELEASE ORAL 2 TIMES DAILY
Qty: 120 TABLET | Refills: 0 | Status: SHIPPED | OUTPATIENT
Start: 2020-04-28 | End: 2020-08-05

## 2020-04-28 NOTE — TELEPHONE ENCOUNTER
I am not sure why the patient is on potassium as his potassium has been good the last few times that he has had labs done. I am not very familiar with this patient I am just helping in the absence of Dr Wilde so I am going to defer this request to her. I can send in the Aspercreme.

## 2020-04-28 NOTE — TELEPHONE ENCOUNTER
Dr. Valenzuela,    Routing refill request to provider for review/approval because:  Medication is reported/historical K+ 10 meq. Catherine WANG RN

## 2020-04-28 NOTE — TELEPHONE ENCOUNTER
I'm in office today, not sure why this was routed to out of office pool...  But anyway, looks like his last prescription for potassium was stopped when he was started on spironolactone last May.  He'll likely be fine stopping the potassium- please advise him he can stop this med.    Thanks,  Chidi Valenzuela MD

## 2020-04-28 NOTE — TELEPHONE ENCOUNTER
Dr. Marrufo,      Patient needs K+ refilled to and it is historical.  He is taking 10 meq daily.  Patient asking for aspercreme Rx too. Catherine WANG RN

## 2020-04-28 NOTE — TELEPHONE ENCOUNTER
Reason for Call:  Other prescription    Detailed comments: The pt is calling and is out of the following medications:  Potassium Chloride, Protonix  [facility].    Phone Number Patient can be reached at: Home number on file [phone] (home)    Best Time: any    Can we leave a detailed message on this number? YES    Call taken on 4/28/2020 at 11:25 AM by Catherine Russell

## 2020-04-28 NOTE — TELEPHONE ENCOUNTER
Provider covering for Dr. Valenzuela,    Patient's protonix has .  Pended for your consideration.  Catherine WANG RN

## 2020-05-04 ENCOUNTER — TELEPHONE (OUTPATIENT)
Dept: FAMILY MEDICINE | Facility: CLINIC | Age: 60
End: 2020-05-04

## 2020-05-04 ENCOUNTER — TELEPHONE (OUTPATIENT)
Dept: GASTROENTEROLOGY | Facility: CLINIC | Age: 60
End: 2020-05-04

## 2020-05-04 NOTE — TELEPHONE ENCOUNTER
"Received reminder to follow up with patient. LPN spoke with patient to see how he was doing and patient stated \"well I hurt like hell. I was supposed to have my hip replaced, but they cancelled it. Otherwise, I'm doing alright. I'm not having any bleeding or anything.\" LPN asked patient if he is having any dizziness or weakness and patient stated \"weakness in my leg but that's because of my hip and I get dizzy when I stand up too fast, so I take it slow or I'll go down. I have some constipation from those pills they gave me with codeine.\" LPN asked patient if he is taking anything for the constipation and patient replyed \"they gave me some pills for that, but they don't do anything and now I have that crystal stuff that I have to drink and that seems to do the trick.\" Patient denied having any further concerns at this time.    Julia Gorman, AJIT    "

## 2020-05-04 NOTE — TELEPHONE ENCOUNTER
"Hurts to pee    Reason for call:  Patient reporting a symptom    Symptom or request: Pt states that he finished  med and it still \"hurts to pee.\"  Pt wants to come in and do another urine test.  Please call patient and advise.      Duration (how long have symptoms been present): ongoing    Have you been treated for this before? Yes    Additional comments:     Phone Number patient can be reached at:  Cell number on file:    Telephone Information:   Mobile [phone]     Best Time:  any    Can we leave a detailed message on this number:  YES    Call taken on 5/4/2020 at 2:29 PM by Nenita Maria    "

## 2020-05-07 ENCOUNTER — DOCUMENTATION ONLY (OUTPATIENT)
Dept: FAMILY MEDICINE | Facility: CLINIC | Age: 60
End: 2020-05-07

## 2020-05-07 DIAGNOSIS — D72.829 LEUKOCYTOSIS, UNSPECIFIED TYPE: ICD-10-CM

## 2020-05-07 DIAGNOSIS — N30.00 ACUTE CYSTITIS WITHOUT HEMATURIA: ICD-10-CM

## 2020-05-07 DIAGNOSIS — R82.90 NONSPECIFIC FINDING ON EXAMINATION OF URINE: Primary | ICD-10-CM

## 2020-05-07 LAB
ALBUMIN UR-MCNC: NEGATIVE MG/DL
APPEARANCE UR: ABNORMAL
BACTERIA #/AREA URNS HPF: ABNORMAL /HPF
BILIRUB UR QL STRIP: NEGATIVE
COLOR UR AUTO: YELLOW
ERYTHROCYTE [DISTWIDTH] IN BLOOD BY AUTOMATED COUNT: 22.6 % (ref 10–15)
GLUCOSE UR STRIP-MCNC: NEGATIVE MG/DL
HCT VFR BLD AUTO: 29.1 % (ref 40–53)
HGB BLD-MCNC: 8.7 G/DL (ref 13.3–17.7)
HGB UR QL STRIP: ABNORMAL
KETONES UR STRIP-MCNC: NEGATIVE MG/DL
LEUKOCYTE ESTERASE UR QL STRIP: ABNORMAL
MCH RBC QN AUTO: 26.7 PG (ref 26.5–33)
MCHC RBC AUTO-ENTMCNC: 29.9 G/DL (ref 31.5–36.5)
MCV RBC AUTO: 89 FL (ref 78–100)
NITRATE UR QL: NEGATIVE
NON-SQ EPI CELLS #/AREA URNS LPF: ABNORMAL /LPF
PH UR STRIP: 5 PH (ref 5–7)
PLATELET # BLD AUTO: 138 10E9/L (ref 150–450)
RBC # BLD AUTO: 3.26 10E12/L (ref 4.4–5.9)
RBC #/AREA URNS AUTO: ABNORMAL /HPF
SOURCE: ABNORMAL
SP GR UR STRIP: 1.01 (ref 1–1.03)
UROBILINOGEN UR STRIP-ACNC: 0.2 EU/DL (ref 0.2–1)
WBC # BLD AUTO: 13.8 10E9/L (ref 4–11)
WBC #/AREA URNS AUTO: ABNORMAL /HPF

## 2020-05-07 PROCEDURE — 36415 COLL VENOUS BLD VENIPUNCTURE: CPT | Performed by: NURSE PRACTITIONER

## 2020-05-07 PROCEDURE — 81001 URINALYSIS AUTO W/SCOPE: CPT | Performed by: NURSE PRACTITIONER

## 2020-05-07 PROCEDURE — 87086 URINE CULTURE/COLONY COUNT: CPT | Performed by: NURSE PRACTITIONER

## 2020-05-07 PROCEDURE — 85027 COMPLETE CBC AUTOMATED: CPT | Performed by: NURSE PRACTITIONER

## 2020-05-07 NOTE — PROGRESS NOTES
Patient was in for a blood draw today. Patient requested the COVID 19 Antibody Serum test to be ran. I referred patient to contact his Care Team for his request. We did, however, draw the correct color tube for the test in case it is able to be added on.   Please advise[facility]    Thank you, [facility]  [phone]

## 2020-05-08 ENCOUNTER — NURSE TRIAGE (OUTPATIENT)
Dept: NURSING | Facility: CLINIC | Age: 60
End: 2020-05-08

## 2020-05-08 DIAGNOSIS — N39.0 URINARY TRACT INFECTION: Primary | ICD-10-CM

## 2020-05-08 DIAGNOSIS — N30.00 ACUTE CYSTITIS WITHOUT HEMATURIA: ICD-10-CM

## 2020-05-08 LAB
BACTERIA SPEC CULT: NORMAL
Lab: NORMAL
SPECIMEN SOURCE: NORMAL

## 2020-05-08 RX ORDER — SULFAMETHOXAZOLE/TRIMETHOPRIM 800-160 MG
1 TABLET ORAL 2 TIMES DAILY
Qty: 10 TABLET | Refills: 0 | Status: SHIPPED | OUTPATIENT
Start: 2020-05-08 | End: 2020-05-12

## 2020-05-08 RX ORDER — SULFAMETHOXAZOLE AND TRIMETHOPRIM 400; 80 MG/1; MG/1
1 TABLET ORAL 2 TIMES DAILY
Qty: 10 TABLET | Refills: 0 | Status: SHIPPED | OUTPATIENT
Start: 2020-05-08 | End: 2020-05-08

## 2020-05-08 NOTE — PROGRESS NOTES
Surgeon is responsible of ordering the test. Primary care providers should not order preoperative COVID screening test.     LOPEZ Ramey CNP

## 2020-05-08 NOTE — PROGRESS NOTES
Advised he calls [phone] this is the central number for scheduling I let lab know as well.  Margo Angulo RN

## 2020-05-08 NOTE — TELEPHONE ENCOUNTER
Labs stable, white blood cell count improved, but he still have mild urinary infection, culture pending, recommend Bactrim 1 tablet twice daily for 5 more days and repeat UA on Monday-Tuesday next week, if normal he is cleared for surgery.     LOPEZ Ramey CNP

## 2020-05-08 NOTE — TELEPHONE ENCOUNTER
Regular recommended dose of antibiotic.Rudeness is not appropriate, I am aware about patients behavior in the past with clinic staff, please bring this issue to clinic manager attention.     Thank you!    LOPEZ Ramey CNP

## 2020-05-08 NOTE — TELEPHONE ENCOUNTER
Clinic Action Needed:Yes, Please call patient on cell phone [phone]  Reason for Call:Patient calling stating he was in to clinic yesterday 5/7/20. Patient is requesting lab results. In addition patient is requesting COVID19 testing be scheduled for surgery. Reviewed with patient COVID testing would be ordered through his surgeon.       Tati Carter RN  [facility]              Reason for Disposition    Caller requesting lab results    Additional Information    Negative: Lab calling with strep throat test results and triager can call in prescription    Negative: Lab calling with urinalysis test results and triager can call in prescription    Negative: Medication questions    Negative: ED call to PCP    Negative: Physician call to PCP    Negative: Call about patient who is currently hospitalized    Negative: Lab or radiology calling with CRITICAL test results    Negative: [1] Prescription not at pharmacy AND [2] was prescribed today by PCP    Negative: [1] Follow-up call from patient regarding patient's clinical status AND [2] information urgent    Negative: [1] Caller requests to speak ONLY to PCP AND [2] URGENT question    Negative: [1] Caller requests to speak to PCP now AND [2] won't tell us reason for call  (Exception: if 10 pm to 6 am, caller must first discuss reason for the call)    Negative: Notification of hospital admission    Negative: Notification of death    Protocols used: PCP CALL - [facility]

## 2020-05-12 ENCOUNTER — PATIENT OUTREACH (OUTPATIENT)
Dept: ONCOLOGY | Facility: CLINIC | Age: 60
End: 2020-05-12

## 2020-05-12 ENCOUNTER — ANCILLARY PROCEDURE (OUTPATIENT)
Dept: GENERAL RADIOLOGY | Facility: CLINIC | Age: 60
End: 2020-05-12
Attending: FAMILY MEDICINE
Payer: COMMERCIAL

## 2020-05-12 ENCOUNTER — OFFICE VISIT (OUTPATIENT)
Dept: FAMILY MEDICINE | Facility: CLINIC | Age: 60
End: 2020-05-12
Payer: COMMERCIAL

## 2020-05-12 VITALS
OXYGEN SATURATION: 96 % | BODY MASS INDEX: 29.6 KG/M2 | SYSTOLIC BLOOD PRESSURE: 117 MMHG | DIASTOLIC BLOOD PRESSURE: 58 MMHG | TEMPERATURE: 98 F | HEART RATE: 68 BPM | WEIGHT: 189 LBS

## 2020-05-12 DIAGNOSIS — N18.30 CKD (CHRONIC KIDNEY DISEASE) STAGE 3, GFR 30-59 ML/MIN (H): ICD-10-CM

## 2020-05-12 DIAGNOSIS — M25.571 PAIN IN JOINT, ANKLE AND FOOT, RIGHT: ICD-10-CM

## 2020-05-12 DIAGNOSIS — N39.0 URINARY TRACT INFECTION: ICD-10-CM

## 2020-05-12 DIAGNOSIS — J45.20 MILD INTERMITTENT ASTHMA WITHOUT COMPLICATION: ICD-10-CM

## 2020-05-12 DIAGNOSIS — R06.02 SOB (SHORTNESS OF BREATH): ICD-10-CM

## 2020-05-12 DIAGNOSIS — D64.9 NORMOCYTIC ANEMIA: Primary | ICD-10-CM

## 2020-05-12 DIAGNOSIS — R82.90 NONSPECIFIC FINDING ON EXAMINATION OF URINE: Primary | ICD-10-CM

## 2020-05-12 LAB
ALBUMIN UR-MCNC: NEGATIVE MG/DL
APPEARANCE UR: CLEAR
BACTERIA #/AREA URNS HPF: ABNORMAL /HPF
BILIRUB UR QL STRIP: NEGATIVE
COLOR UR AUTO: YELLOW
ERYTHROCYTE [DISTWIDTH] IN BLOOD BY AUTOMATED COUNT: 21.6 % (ref 10–15)
GLUCOSE UR STRIP-MCNC: NEGATIVE MG/DL
HCT VFR BLD AUTO: 26 % (ref 40–53)
HGB BLD-MCNC: 7.9 G/DL (ref 13.3–17.7)
HGB UR QL STRIP: ABNORMAL
KETONES UR STRIP-MCNC: NEGATIVE MG/DL
LEUKOCYTE ESTERASE UR QL STRIP: ABNORMAL
MCH RBC QN AUTO: 26.8 PG (ref 26.5–33)
MCHC RBC AUTO-ENTMCNC: 30.4 G/DL (ref 31.5–36.5)
MCV RBC AUTO: 88 FL (ref 78–100)
NITRATE UR QL: NEGATIVE
NON-SQ EPI CELLS #/AREA URNS LPF: ABNORMAL /LPF
PH UR STRIP: 6 PH (ref 5–7)
PLATELET # BLD AUTO: 178 10E9/L (ref 150–450)
RBC # BLD AUTO: 2.95 10E12/L (ref 4.4–5.9)
RBC #/AREA URNS AUTO: ABNORMAL /HPF
SOURCE: ABNORMAL
SP GR UR STRIP: 1.01 (ref 1–1.03)
UROBILINOGEN UR STRIP-ACNC: 0.2 EU/DL (ref 0.2–1)
WBC # BLD AUTO: 9.5 10E9/L (ref 4–11)
WBC #/AREA URNS AUTO: ABNORMAL /HPF

## 2020-05-12 PROCEDURE — 36415 COLL VENOUS BLD VENIPUNCTURE: CPT | Performed by: FAMILY MEDICINE

## 2020-05-12 PROCEDURE — 87086 URINE CULTURE/COLONY COUNT: CPT | Performed by: NURSE PRACTITIONER

## 2020-05-12 PROCEDURE — 81001 URINALYSIS AUTO W/SCOPE: CPT | Performed by: NURSE PRACTITIONER

## 2020-05-12 PROCEDURE — 99215 OFFICE O/P EST HI 40 MIN: CPT | Performed by: FAMILY MEDICINE

## 2020-05-12 PROCEDURE — 85027 COMPLETE CBC AUTOMATED: CPT | Performed by: FAMILY MEDICINE

## 2020-05-12 PROCEDURE — 73630 X-RAY EXAM OF FOOT: CPT | Mod: RT

## 2020-05-12 PROCEDURE — 73610 X-RAY EXAM OF ANKLE: CPT | Mod: RT

## 2020-05-12 RX ORDER — ALBUTEROL SULFATE 90 UG/1
2 AEROSOL, METERED RESPIRATORY (INHALATION) EVERY 4 HOURS PRN
Qty: 18 G | Refills: 11 | Status: SHIPPED | OUTPATIENT
Start: 2020-05-12 | End: 2021-08-27

## 2020-05-12 NOTE — PROGRESS NOTES
"Subjective     Abhijeet Mccauley is a 59 year old male who presents to clinic today for the following health issues:    HPI     * right ankle pain and swelling for the last 10 days, no known injury.  Feels pain in the medial R ankle and medial foot began 2 weeks ago after doing some yard work.  Did not have any particular injury that he can recall but feels he must have \"hit it\" on something.  Pain was significant enough initially that he did not feel he could bear weight.  Is now able to walk but has more pain in the medial ankle and along the 1st metatarsal when weight bearing.       Has never had ankle pain like this in the past.  Has not tried any treatment for the pain since he does not know what caused it.  Started 2 weeks ago.  Was fine then began feeling very tender when he touches it and when he is walking      * allergies, increased shortness of breath.    Feels like it is hard to breath \"once in a while\".      Breathing has been more of an issue over the last 3 weeks.  \"hard to get air\", feels he \"gasps\" for breath.  Feels like he has trouble taking deep breaths.  Notices it particularly when he is out doing yard work.  Over the last 3 weeks feels he can only do about 20 minutes before needing to stop and rest.   Prior to 3 weeks ago felt he could tolerate about 30 minutes of yard work.    He is unclear what is actually causing his limitations with activity, his hip pain or breathing.  Feels his current hip pain is very limiting as he is waiting for a hip replacement.   He denies any cough or fever, no CP.      Pt is a bit of a difficult historian, initially stating that he has never had trouble like this with his breathing and then stating that he has trouble every year in the spring due to \"allergies\".  Feels this is allergy related as he also has increased tearing from his eyes and intermittent nasal congestion.    Is taking claritin BID and singulair.  Has an albuterol inhaler that he uses without a " "spacer about 3 times per day.  Does feel this helps his breathing symptoms.      Chart reviewed reveals a diagnosis of intermittent asthma.  Pt appears to have been prescribed advair/dulera in the past.  No previous PFT's seen.   Pt recalls trying \"the disc\" but did not like the chalky taste and so stopped it.  Cannot recall using any others.  Does not recall every having PFT's.  Pt does continue to smoke daily.      Reviewed and updated as needed this visit by Provider  Tobacco  Allergies  Meds  Med Hx  Surg Hx  Fam Hx  Soc Hx      Pt reports he continues to drink, 4-5 drinks per day.        Review of Systems   Constitutional, HEENT, cardiovascular, pulmonary, gi and gu systems are negative, except as otherwise noted.    Pt specifically denies any fevers or URI symptoms.  He reports no CP, orthopnea or PND.  He denies any abd pain, no vomiting.  Reports occasional nausea after taking his pain meds only.  Appetite is normal.  Reports normal regular bowel movements.  Specifically denies any blood in the stool or melena.  Reports some difficulty initiating his stream of urine but no dysuria or hematuria.  Does have concerns about his penis being \"sunk in\" but that was not addressed today    Notes some lightheadedness/dizziness when bending down and then straightening back up which has been long standing and stable.          Objective    /58   Pulse 68   Temp 98  F (36.7  C) (Tympanic)   Wt 85.7 kg (189 lb)   SpO2 96%   BMI 29.60 kg/m    Body mass index is 29.6 kg/m .  Physical Exam   PE:  VS as above   Gen:  WN/WD/WH male in NAD   Neck:  supple, no LAD appreciated   Heart:  RRR without murmur, nl S1, S2, no rubs or gallops   Lungs CTA carley without rales/ronchi/wheezes, mildly decreased air entry noted throughout   Ext:  No pedal edema L   MSK:  Mild edema of medial ankle R just below the medial malleolus.  Tender on palpation distal to the medial malleolus and along the 1st metatarsal.  No tenderness " laterally.  No redness or warmth to the joint.  ROM decreased in flexion and inversion due to pain and mild edema.  Cap refill <3 seconds   Psych: Alert and oriented times 3; coherent speech, normal   rate and volume, able to articulate logical thoughts, able   to abstract reason, no tangential thoughts, no hallucinations   or delusions  His affect is bright and appropriate        Diagnostic Test Results:  Labs reviewed in Epic      Results for orders placed or performed in visit on 05/12/20   XR Foot Right G/E 3 Views     Status: None    Narrative    RIGHT FOOT THREE OR MORE VIEWS, RIGHT ANKLE THREE OR MORE VIEWS  5/12/2020 2:28 PM     HISTORY: Pain in joint, ankle and foot, right.    FINDINGS: Plantar calcaneal spurring. Marked first MTP osteoarthritis.  There is a small chronic appearing fracture at the inferior aspect of  the lateral malleolus. There could also be an old posterior malleolar  fracture.      Impression    IMPRESSION: No acute fracture identified.    BRIGIDO CHRISTIAN MD   Results for orders placed or performed in visit on 05/12/20   XR Ankle Right G/E 3 Views     Status: None    Narrative    RIGHT FOOT THREE OR MORE VIEWS, RIGHT ANKLE THREE OR MORE VIEWS  5/12/2020 2:28 PM     HISTORY: Pain in joint, ankle and foot, right.    FINDINGS: Plantar calcaneal spurring. Marked first MTP osteoarthritis.  There is a small chronic appearing fracture at the inferior aspect of  the lateral malleolus. There could also be an old posterior malleolar  fracture.      Impression    IMPRESSION: No acute fracture identified.    BRIGIDO CHRISTIAN MD   Results for orders placed or performed in visit on 05/12/20   CBC with platelets     Status: Abnormal   Result Value Ref Range    WBC 9.5 4.0 - 11.0 10e9/L    RBC Count 2.95 (L) 4.4 - 5.9 10e12/L    Hemoglobin 7.9 (LL) 13.3 - 17.7 g/dL    Hematocrit 26.0 (L) 40.0 - 53.0 %    MCV 88 78 - 100 fl    MCH 26.8 26.5 - 33.0 pg    MCHC 30.4 (L) 31.5 - 36.5 g/dL    RDW 21.6 (H) 10.0 - 15.0 %     Platelet Count 178 150 - 450 10e9/L   Results for orders placed or performed in visit on 05/12/20   UA with Microscopic reflex to Culture     Status: Abnormal    Specimen: Midstream Urine   Result Value Ref Range    Color Urine Yellow     Appearance Urine Clear     Glucose Urine Negative NEG^Negative mg/dL    Bilirubin Urine Negative NEG^Negative    Ketones Urine Negative NEG^Negative mg/dL    Specific Gravity Urine 1.010 1.003 - 1.035    pH Urine 6.0 5.0 - 7.0 pH    Protein Albumin Urine Negative NEG^Negative mg/dL    Urobilinogen Urine 0.2 0.2 - 1.0 EU/dL    Nitrite Urine Negative NEG^Negative    Blood Urine Trace (A) NEG^Negative    Leukocyte Esterase Urine Large (A) NEG^Negative    Source Midstream Urine     WBC Urine 25-50 (A) OTO5^0 - 5 /HPF    RBC Urine 2-5 (A) OTO2^O - 2 /HPF    Squamous Epithelial /LPF Urine Few FEW^Few /LPF    Bacteria Urine Few (A) NEG^Negative /HPF         A/P:      ICD-10-CM    1. Normocytic anemia  D64.9 CBC with platelets     **CBC with platelets FUTURE anytime     Ferritin     Vitamin B12     Folate     Oncology/Hematology Adult Referral   2. SOB (shortness of breath)  R06.02 mometasone-formoterol (DULERA) 200-5 MCG/ACT inhaler   3. Mild intermittent asthma without complication  J45.20 albuterol (VENTOLIN HFA) 108 (90 Base) MCG/ACT inhaler     mometasone-formoterol (DULERA) 200-5 MCG/ACT inhaler   4. Pain in joint, ankle and foot, right  M25.571 XR Ankle Right G/E 3 Views     XR Foot Right G/E 3 Views   5. CKD (chronic kidney disease) stage 3, GFR 30-59 ml/min (H)  N18.3 **Basic metabolic panel FUTURE anytime     SOB:  Chart review reveals chronic concerns with SOB including a visit in the last year where he was treated for a COPD exacerbation.  He is currently using albuterol with improvement.  No PFT's available.  Pt will need PFT's at some point but given other issues at play today will defer ordering those for now.  Pt with Lexiscan 1/2019 with no evidence of ischemia and  recent normal EKG.  Doubt cardiac etiology.  Upon close questioning pt feels his current symptoms are not new but rather episodic over years.    Will begin controller as ordered and albuterol refilled.  Pt was given a spacer in clinic and shown how to use it.  Encouraged to use it with both inhalers.  Pt verbalized understanding.    Anemia:  Chronic and long standing for pt.  Decreased today from last check 1 week ago.  Pt vehemently denies any evidence of bleeding.  Frustrated that this does not improve.  Has been avoiding NSAID's and taking his protonix as scheduled.  Unclear if contributing to current respiratory symptoms.  Pt reports breathing concerns stable over multiple weeks so doubt significant contribution.  He denies any new lightheadedness or dizziness.    Anemia likely multifactorial given h/o alcoholic gastritis and varices with previous GI bleeding leading to iron deficiency, significant chronic kidney disease and alcohol abuse.  Last ferritin checked 12/2019 and was low at 17.  Pt reports he does not tolerate oral iron and will not take it.  Review of allergies shows apparent allergy to IV iron as well.  Reviewed with pt that it is very important that he follow up with hematology to help with management.  Will likely need to figure out some way to replace iron and perhaps consideration of erythropoiesis stimulating agent if his iron stores can be made replete and the anemia persists.  Also briefly discussed the role of alcohol in bone marrow suppression and likely chronic low grade bleeding in the stomach.      Advised pt to recheck Hgb tomorrow to confirm stability.  Advised him to seek ED care immediately for any sign of bleeding or worsening symptoms of anemia which he has experienced in the past.  Given current result not far from baseline, no active bleeding and pt does not seem to have acute symptoms related to current hemoglobin did not advise transfusion or ED eval at this time    Foot/ankle  pain:  Unclear etiology.  X-ray negative for acute injury. Joint is not warm or red, pain along the metatarsal as well speaks against a joint pathology such as gout or infection. Perhaps a strain/sprain.  Advised ice, elevation and supportive brace for comfort.  Should be improving over the next 1-2 weeks.      Urine results were noted after pt visit and were not ordered by me.  Pt without current dysuria or hematuria.  Recent urine culture negative.  Will await culture results

## 2020-05-12 NOTE — PROGRESS NOTES
New Patient Oncology Nurse Navigator Note     Referring provider: Josefina Eden     Referring Clinic/Organization: [facility]     Referred to: Benign Hematology    Requested provider (if applicable): First available - did not specify     Referral Received: 05/12/20       Evaluation for : Normocytic anemia, h/o AML     Clinical History (per Nurse review of records provided):      Previously seen by Dr. Rowe at [facility], last visit was in 2018  At the time Anemia likely related to Iron deficiency,  BMBX showed no recurrence of AML in 2018    Currently, having SOB, Hgb drop 1 gram in a week .    Recently ankle pain,  Was to have Total hip replacement in April, but was cancelled due to the COVID restrictions.  HGB is down to 7.9    PCP is running more labs on 5/13/20   Clinical Assessment / Barriers to Care (Per Nurse):    Needs to be seen ASAP as Return patient at [facility].  Has history of being rude to staff.    Records Location: [facility]     Records Needed:     None    Additional testing needed prior to consult:     Iron panel and Ferritin, being ordered by referring MD      NEXT STEP:  Message sent to [facility] oncology  pool to schedule patient with Dr. Rowe ASAP,  Dr. Rowe's schedule appears to have openings still this week.  Message to Jae's care team as well     Angelina Faulkner LPN

## 2020-05-12 NOTE — PATIENT INSTRUCTIONS
For the anemia (low blood count):  Please come back tomorrow to recheck, we need to make sure this is stable and not decreasing.  If you have any signs of bleeding (blood in your stool or vomiting blood) be sure to go straight to the emergency room.  I would also like you to see hematology (blood doctors) to review.    For the breathing:  Probably due to asthma or COPD.  Please begin the new inhaler scheduled twice per day using the spacer we gave you.  Plan on follow up with Dr Rivero on your breathing in 2 weeks.    For the ankle:  Your x-ray did not show any new problems, perhaps a sprain.  Ice for 10 minutes a few times per day and use the ankle brace.

## 2020-05-12 NOTE — NURSING NOTE
"Initial /58   Pulse 68   Temp 98  F (36.7  C) (Tympanic)   Wt 85.7 kg (189 lb)   SpO2 96%   BMI 29.60 kg/m   Estimated body mass index is 29.6 kg/m  as calculated from the following:    Height as of 4/23/20: 1.702 m (5' 7\").    Weight as of this encounter: 85.7 kg (189 lb). .      "

## 2020-05-12 NOTE — Clinical Note
Ralph Clifton.  I saw this pt of yours Tuesday.  He's a bit of a complicated inga.  Anemia was worse but he denies any recent bleeding.  Seem to be pretty asymptomatic.  We're rechecking tomorrow for stability.  I see he is hoping to have a hip replacement and looks like he was cleared for surgery.  I let him know we need to get this anemia figured out and more stable before a procedure like that.  I will follow up on his Hgb recheck tomorrow.  I asked him to follow up with you next week to review his breathing and plan.  Let me know if you have any questions.  Thanks!  Josefina

## 2020-05-12 NOTE — LETTER
May 18, 2020      Abhijeet Mccauley  [address] [place] [address]        Dear ,    We are writing to inform you of your test results.    Urine culture is negative     Resulted Orders   UA with Microscopic reflex to Culture   Result Value Ref Range    Color Urine Yellow     Appearance Urine Clear     Glucose Urine Negative NEG^Negative mg/dL    Bilirubin Urine Negative NEG^Negative    Ketones Urine Negative NEG^Negative mg/dL    Specific Gravity Urine 1.010 1.003 - 1.035    pH Urine 6.0 5.0 - 7.0 pH    Protein Albumin Urine Negative NEG^Negative mg/dL    Urobilinogen Urine 0.2 0.2 - 1.0 EU/dL    Nitrite Urine Negative NEG^Negative    Blood Urine Trace (A) NEG^Negative    Leukocyte Esterase Urine Large (A) NEG^Negative    Source Midstream Urine     WBC Urine 25-50 (A) OTO5^0 - 5 /HPF    RBC Urine 2-5 (A) OTO2^O - 2 /HPF    Squamous Epithelial /LPF Urine Few FEW^Few /LPF    Bacteria Urine Few (A) NEG^Negative /HPF   Urine Culture Aerobic Bacterial   Result Value Ref Range    Specimen Description Midstream Urine     Special Requests Specimen received in preservative     Culture Micro       10,000 to 50,000 colonies/mL  mixed urogenital juani  Susceptibility testing not routinely done         If you have any questions or concerns, please call the clinic at the number listed above.       Sincerely,        Dr. Valenzuela

## 2020-05-13 ENCOUNTER — TELEPHONE (OUTPATIENT)
Dept: FAMILY MEDICINE | Facility: CLINIC | Age: 60
End: 2020-05-13

## 2020-05-13 ENCOUNTER — PATIENT OUTREACH (OUTPATIENT)
Dept: ONCOLOGY | Facility: CLINIC | Age: 60
End: 2020-05-13

## 2020-05-13 ENCOUNTER — TELEPHONE (OUTPATIENT)
Dept: GASTROENTEROLOGY | Facility: CLINIC | Age: 60
End: 2020-05-13

## 2020-05-13 DIAGNOSIS — D64.9 NORMOCYTIC ANEMIA: ICD-10-CM

## 2020-05-13 DIAGNOSIS — D62 ANEMIA DUE TO BLOOD LOSS, ACUTE: Primary | ICD-10-CM

## 2020-05-13 DIAGNOSIS — D64.9 NORMOCYTIC ANEMIA: Primary | ICD-10-CM

## 2020-05-13 DIAGNOSIS — N18.30 CKD (CHRONIC KIDNEY DISEASE) STAGE 3, GFR 30-59 ML/MIN (H): ICD-10-CM

## 2020-05-13 LAB
ANION GAP SERPL CALCULATED.3IONS-SCNC: 8 MMOL/L (ref 3–14)
BACTERIA SPEC CULT: NORMAL
BUN SERPL-MCNC: 25 MG/DL (ref 7–30)
CALCIUM SERPL-MCNC: 8.9 MG/DL (ref 8.5–10.1)
CHLORIDE SERPL-SCNC: 111 MMOL/L (ref 94–109)
CO2 SERPL-SCNC: 21 MMOL/L (ref 20–32)
CREAT SERPL-MCNC: 2.27 MG/DL (ref 0.66–1.25)
ERYTHROCYTE [DISTWIDTH] IN BLOOD BY AUTOMATED COUNT: 21.5 % (ref 10–15)
FERRITIN SERPL-MCNC: 15 NG/ML (ref 26–388)
FOLATE SERPL-MCNC: 4.1 NG/ML
GFR SERPL CREATININE-BSD FRML MDRD: 30 ML/MIN/{1.73_M2}
GLUCOSE SERPL-MCNC: 73 MG/DL (ref 70–99)
HCT VFR BLD AUTO: 26.1 % (ref 40–53)
HGB BLD-MCNC: 7.8 G/DL (ref 13.3–17.7)
IRON SATN MFR SERPL: 4 % (ref 15–46)
IRON SERPL-MCNC: 16 UG/DL (ref 35–180)
Lab: NORMAL
MCH RBC QN AUTO: 26.4 PG (ref 26.5–33)
MCHC RBC AUTO-ENTMCNC: 29.9 G/DL (ref 31.5–36.5)
MCV RBC AUTO: 89 FL (ref 78–100)
PLATELET # BLD AUTO: 150 10E9/L (ref 150–450)
POTASSIUM SERPL-SCNC: 4 MMOL/L (ref 3.4–5.3)
RBC # BLD AUTO: 2.95 10E12/L (ref 4.4–5.9)
SODIUM SERPL-SCNC: 140 MMOL/L (ref 133–144)
SPECIMEN SOURCE: NORMAL
TIBC SERPL-MCNC: 357 UG/DL (ref 240–430)
WBC # BLD AUTO: 8 10E9/L (ref 4–11)

## 2020-05-13 PROCEDURE — 80048 BASIC METABOLIC PNL TOTAL CA: CPT | Performed by: FAMILY MEDICINE

## 2020-05-13 PROCEDURE — 82746 ASSAY OF FOLIC ACID SERUM: CPT | Performed by: FAMILY MEDICINE

## 2020-05-13 PROCEDURE — 36415 COLL VENOUS BLD VENIPUNCTURE: CPT | Performed by: FAMILY MEDICINE

## 2020-05-13 PROCEDURE — 83540 ASSAY OF IRON: CPT | Performed by: FAMILY MEDICINE

## 2020-05-13 PROCEDURE — 85027 COMPLETE CBC AUTOMATED: CPT | Performed by: FAMILY MEDICINE

## 2020-05-13 PROCEDURE — 82607 VITAMIN B-12: CPT | Performed by: FAMILY MEDICINE

## 2020-05-13 PROCEDURE — 82728 ASSAY OF FERRITIN: CPT | Performed by: FAMILY MEDICINE

## 2020-05-13 PROCEDURE — 83550 IRON BINDING TEST: CPT | Performed by: FAMILY MEDICINE

## 2020-05-13 NOTE — TELEPHONE ENCOUNTER
Contact regarding critical lab value in this patient.  Hemoglobin 7.8.  This does not appear to be a chronic condition.    Hemoglobin   Date Value Ref Range Status   05/12/2020 7.9 (LL) 13.3 - 17.7 g/dL Final   05/07/2020 8.7 (L) 13.3 - 17.7 g/dL Final

## 2020-05-13 NOTE — TELEPHONE ENCOUNTER
Patient is upset as he does not now why he has to wait for his urine cultures. He wants results now. He wants answers as to why his hgb continues to drop. Patient has an appointment on 5.20.2020 with oncology/hemetology. Patient is wanting to talk with Dr. Morley's staff. Patient was given phone number and he will call.  Kellie Putnam RN

## 2020-05-13 NOTE — TELEPHONE ENCOUNTER
RN received critical value print out for patient (lab states they have been unsuccessful at reaching [facility] team).  hGb = 7.8 was previously 7.9.    Routing to provider.  Fatou ESTRADA RN

## 2020-05-13 NOTE — TELEPHONE ENCOUNTER
Called spoke with patient   He has NOT have any bleeding new or old per pt,no rectal bleeding at this time   He feels ok ,advised sx to seek ED for  Any bleeding,  feeling weak , N/V dizziness or noted rectal bleeding   Advised need for lab Friday   Asked to get done in early AM if at all possible so result earlier , pt will try has transportation problem   Pt aware pf appt next week and plans on keeping it  Want this resolved !  May call [facility]  as need   NEERU Silva  Clinic  RN/Misha Russo

## 2020-05-13 NOTE — TELEPHONE ENCOUNTER
Sent a message to Endo scheduling for VCE ordered by Dr. Loco  Of note, pts Hgb dropped by 0.1 today.    Chelsea Ortiz, RN  Gastroenterology Care Coordinator  [place], [place]

## 2020-05-13 NOTE — TELEPHONE ENCOUNTER
Reviewed lab result.  Consistent with result from yesterday of 7.9.  Not far off pt's baseline recently and no new symptoms of anemia at visit yesterday.    Please call pt and inform him of results.  Please reconfirm with him the he is not having any bleeding (BRBPR or melena)  He denied any bleeding at his visit yesterday.  Please reconfirm with him that he will seek ER care for any bleeding or symptoms of anemia consistent with what he has experienced in the past.    His iron studies show iron deficiency as we discussed yesterday.  His kidney function was stable.    I would like to recheck the Hgb again on Friday for longer term stability.  He has a heme appointment on 5/20 and it is important that he keep that.    He had called earlier in the day regarding a urine result from yesterday.  I did not order that and was not aware it was done.  That had been ordered by one of the [place] providers he saw.  It looks very similar to his last UA which ended up having a negative culture.  He should be hearing about a plan from his [place] team regarding this as well but I would not recommend any treatment unless his culture is positive.     Josefina Eden,

## 2020-05-13 NOTE — TELEPHONE ENCOUNTER
Patient requesting his UA (orders placed 5-8-20 by Yessy) results from yesterday - final.  Culture is still pending.    Routing to provider.  Fatou ESTRADA RN

## 2020-05-13 NOTE — TELEPHONE ENCOUNTER
Reason for Call:  Request for results:    Name of test or procedure: Labs - Pt calling for results.  He is concerned about his lab #s.  Please call patient and advise.      Date of test of procedure: 5/12/20    Location of the test or procedure: [facility] to leave the result message on voice mail or with a family member? YES    Phone number Patient can be reached at:  Cell number on file:    Telephone Information:   Mobile [phone]     Additional comments:     Call taken on 5/13/2020 at 8:45 AM by Nenita Maria

## 2020-05-13 NOTE — PROGRESS NOTES
Patient called wondering why he needed to be seen by Dr. Rowe and what was the plan. Let patient know that he is being referred back here as his Hgb is trending downward again and he may need IV iron or other recommendations for this to get it back up. Patient states his upcoming surgery is on hold due to this.  He will need to see Dr. Rowe first in order for him to order this as he hasn't been seen since July 2018. Let patient know we could do a phone visit instead of having him come in person. Patient verbalized understanding and is in agreement with plan. Juana Comer RN on 5/13/2020 at 10:36 AM

## 2020-05-14 ENCOUNTER — TELEPHONE (OUTPATIENT)
Dept: FAMILY MEDICINE | Facility: CLINIC | Age: 60
End: 2020-05-14

## 2020-05-14 LAB — VIT B12 SERPL-MCNC: 418 PG/ML (ref 193–986)

## 2020-05-15 ENCOUNTER — TELEPHONE (OUTPATIENT)
Dept: FAMILY MEDICINE | Facility: CLINIC | Age: 60
End: 2020-05-15

## 2020-05-15 NOTE — LETTER
[facility]  [address] [address]  Phone: [phone]       May 29, 2020         Abhijeet Mccauley  [address] [place] [id number]            Dear Abhijeet:    We have been unsuccessful trying to reach you by phone. Labs from 5- show a low folate which is a vitamin involved in making blood cells.  I would recommend you begin an over the counter folate supplement 400mcg once daily.       Thank you,  Josefina Eden, DO

## 2020-05-15 NOTE — OP NOTE
EGD - marked whitish plaques in esophagus suspicious for Candida, biopsied.  No varices seen through thick plaques.  Stomach with multiple small ulcers.  Biopsy taken for H pylori.  Duodenum normal.  
p (1480)     HPI: 38y F pmhx bipolar mood disorder, depression, GERD, L2-L3 fusion (2019), L2 decompression (2019) p/w back pain. Pt states she may have "twisted the wrong way" while setting up a pool in her backyard. Pain was immediate but worsened last night, sharp "stabbing pain", radiating for upper lumbar spine to groin, 8/10 severity. Able to ambulate but with severe pain. Lumbar fusion with Dr. Kerns at [facility]. Motrin 7am this morning with no relief. Has left calf numbness and tingling. Pt denies chest pain, SOB, cough, abdominal pain, n/v/d, dysuria, hematuria, change in stools, urinary or fecal retention/incontinence, saddle anesthesia.      Imaging:    Exam: AAOx3, FC, LLE HF/KF/KE 4/5 pain limited, otherwise full strength. SILT.    --Anticoagulation:    =====================  PAST MEDICAL HISTORY   Anemia  GERD (gastroesophageal reflux disease)  Depression  ADD (attention deficit disorder)  Bipolar mood disorder  Depression  ADD (attention deficit disorder)  Depression    PAST SURGICAL HISTORY   Endometrial polyp  Ganglion cyst of wrist, left  Bariatric surgery status  S/P  section  S/P cholecystectomy  Status post cholecystectomy  Fitting and adjustment of gastric lap band  Delivery by emergency caesarean section  H/O bariatric surgery  S/P cholecystectomy    Robaxin (Anaphylaxis)      MEDICATIONS:  Antibiotics:    Neuro:    Other:      SOCIAL HISTORY:   Occupation:   Marital Status:     FAMILY HISTORY:  No pertinent family history in first degree relatives      ROS: Negative except per HPI    LABS:                          11.9   7.34  )-----------( 367      ( 15 May 2020 15:23 )             37.4     05-15    139  |  104  |  12  ----------------------------<  76  4.2   |  23  |  0.88    Ca    9.3      15 May 2020 15:23    TPro  7.2  /  Alb  4.2  /  TBili  0.2  /  DBili  x   /  AST  14  /  ALT  18  /  AlkPhos  74  05-15

## 2020-05-15 NOTE — LETTER
[facility]  [address] [address]  Phone: [phone]       May 29, 2020         Abhijeet Mccauley  [address] [place] [id number]            Dear Abhijeet:    We have been unsuccessful in trying to reach you by phone. Your lab results from 5- show a low folate which is a vitamin involved in making blood cells.  I would recommend he begin an over the counter folate supplement 400mcg once daily    Thank you,      Josefina Eden, DO

## 2020-05-15 NOTE — TELEPHONE ENCOUNTER
Please call pt.  Labs from earlier in the week show a low folate which is a vitamin involved in making blood cells.  I would recommend he begin an over the counter folate supplement 400mcg once daily    Josefina Eden,

## 2020-05-17 NOTE — TELEPHONE ENCOUNTER
Reviewed and agree. I reviewed his chart, his anemia appears to be subacute to chronic and reasonably well compensated. His Hgb is stable, doubt active bleeding. Watch for symptoms and rechecking his Hgb next weeks seems appropriate.

## 2020-05-18 ENCOUNTER — TELEPHONE (OUTPATIENT)
Dept: FAMILY MEDICINE | Facility: CLINIC | Age: 60
End: 2020-05-18

## 2020-05-18 NOTE — TELEPHONE ENCOUNTER
Reason for Call:  Other call back    Detailed comments: Patient is getting told that his blood count is to low. Patient is lost what to do he has been trying to figure this out the last week. The surgery called him and told him they cant do the surgery.     Phone Number Patient can be reached at: Cell number on file:    Telephone Information:   Mobile [phone]       Best Time: any    Can we leave a detailed message on this number? YES    Call taken on 5/18/2020 at 10:39 AM by Karen Menjivar

## 2020-05-18 NOTE — TELEPHONE ENCOUNTER
Dr. Valenzuela,    Please see message about low HGB and upcoming surgery and advise. Catherine WANG RN

## 2020-05-18 NOTE — TELEPHONE ENCOUNTER
Can you please try to contact Dr. Carlton Jones's office to clarify?  When I talked to him in March, he was okay with proceeding with surgery as long as the anemia was stable, and his hemoglobin is pretty similar now to what it was in March.      Abhijeet has an appointment with hematology on 5/20 to discuss the anemia, so please remind him to keep this appointment as this is the important next step for evaluating his anemia.    Thanks,  Chidi Valenzuela MD

## 2020-05-18 NOTE — TELEPHONE ENCOUNTER
He had a preop with Yessy on 4/23, which she already addended on 5/10 to clear him for surgery.  Is this acceptable?    Chidi Valenzuela MD

## 2020-05-18 NOTE — TELEPHONE ENCOUNTER
Dr. Valenzuela,    Contacted Emma (direct number is [phone]) about this patient's HGB and surgery.  This patient needs new H and P per Emma.  Please advise.  Then I will call Abhijeet. Catherine WANG RN

## 2020-05-19 ENCOUNTER — TELEPHONE (OUTPATIENT)
Dept: FAMILY MEDICINE | Facility: CLINIC | Age: 60
End: 2020-05-19

## 2020-05-19 NOTE — TELEPHONE ENCOUNTER
Reason for call:  Patient reporting a symptom    Symptom or request: Memory loss    Duration (how long have symptoms been present): 5-6 months    Have you been treated for this before? No    Additional comments: Patient is worried about his memory, says it's getting worse    Phone Number patient can be reached at:  Cell number on file:    Telephone Information:   Mobile [phone]       Best Time:  Any    Can we leave a detailed message on this number:  YES    Call taken on 5/19/2020 at 2:17 PM by Lia Montalvo

## 2020-05-19 NOTE — TELEPHONE ENCOUNTER
Patient states that he was told his surgery continues to be delayed. Called Emma at [facility] and was told he will be called to schedule surgery at some point today.     Patient notified.      LEONARD ThaoN, RN

## 2020-05-19 NOTE — TELEPHONE ENCOUNTER
Patient should be scheduled for follow up with PCP.     Attempted to contact patient, no answer/no voicemail.      Terra Zelaya, LEONARDN, RN

## 2020-05-19 NOTE — TELEPHONE ENCOUNTER
Reason for call:  Patient reporting a symptom    Symptom or request: Patient wants to know why his surgery is delayed and if lab results are preventing surgery and has a sore throat.    Duration (how long have symptoms been present): A while    Have you been treated for this before? Yes      Phone Number patient can be reached at:  Cell number on file:    Telephone Information:   Mobile [phone]       Best Time:  Any    Can we leave a detailed message on this number:  YES    Call taken on 5/19/2020 at 8:59 AM by Lia Montalvo

## 2020-05-20 ENCOUNTER — TELEPHONE (OUTPATIENT)
Dept: FAMILY MEDICINE | Facility: CLINIC | Age: 60
End: 2020-05-20

## 2020-05-20 ENCOUNTER — TELEPHONE (OUTPATIENT)
Dept: GASTROENTEROLOGY | Facility: CLINIC | Age: 60
End: 2020-05-20

## 2020-05-20 ENCOUNTER — VIRTUAL VISIT (OUTPATIENT)
Dept: ONCOLOGY | Facility: CLINIC | Age: 60
End: 2020-05-20
Attending: FAMILY MEDICINE
Payer: COMMERCIAL

## 2020-05-20 DIAGNOSIS — C92.01 ACUTE MYELOID LEUKEMIA IN REMISSION (H): Primary | ICD-10-CM

## 2020-05-20 DIAGNOSIS — D50.0 IRON DEFICIENCY ANEMIA DUE TO CHRONIC BLOOD LOSS: ICD-10-CM

## 2020-05-20 DIAGNOSIS — K70.30 ALCOHOLIC CIRRHOSIS OF LIVER WITHOUT ASCITES (H): ICD-10-CM

## 2020-05-20 DIAGNOSIS — D64.9 NORMOCYTIC ANEMIA: ICD-10-CM

## 2020-05-20 DIAGNOSIS — I10 ESSENTIAL HYPERTENSION: ICD-10-CM

## 2020-05-20 DIAGNOSIS — D69.6 THROMBOCYTOPENIA (H): ICD-10-CM

## 2020-05-20 DIAGNOSIS — I85.11 SECONDARY ESOPHAGEAL VARICES WITH BLEEDING (H): ICD-10-CM

## 2020-05-20 DIAGNOSIS — N18.30 CKD (CHRONIC KIDNEY DISEASE) STAGE 3, GFR 30-59 ML/MIN (H): ICD-10-CM

## 2020-05-20 DIAGNOSIS — Z11.59 ENCOUNTER FOR SCREENING FOR OTHER VIRAL DISEASES: Primary | ICD-10-CM

## 2020-05-20 PROCEDURE — 99215 OFFICE O/P EST HI 40 MIN: CPT | Mod: 95 | Performed by: INTERNAL MEDICINE

## 2020-05-20 RX ORDER — HEPARIN SODIUM,PORCINE 10 UNIT/ML
5 VIAL (ML) INTRAVENOUS
Status: CANCELLED | OUTPATIENT
Start: 2020-05-20

## 2020-05-20 RX ORDER — FOLIC ACID 1 MG/1
2 TABLET ORAL DAILY
Qty: 60 TABLET | Refills: 1 | Status: ON HOLD | OUTPATIENT
Start: 2020-05-20 | End: 2020-06-25

## 2020-05-20 RX ORDER — HEPARIN SODIUM (PORCINE) LOCK FLUSH IV SOLN 100 UNIT/ML 100 UNIT/ML
5 SOLUTION INTRAVENOUS
Status: CANCELLED | OUTPATIENT
Start: 2020-05-20

## 2020-05-20 NOTE — TELEPHONE ENCOUNTER
Was this already ordered by Dr. Jones at [facility]? Left message for care team.      LEONARD ThaoN, RN

## 2020-05-20 NOTE — TELEPHONE ENCOUNTER
Reason for Call:  Other     Detailed comments: pt is calling and stating that he needs to have it ok'd prior to Friday to get the Covid 19 blood test done?  Surgery is coming up on 5/26/202 please adivse    Phone Number Patient can be reached at: Cell number on file:    Telephone Information:   Mobile [phone]       Best Time: any    Can we leave a detailed message on this number? YES    Call taken on 5/20/2020 at 2:09 PM by Catherine Russell

## 2020-05-20 NOTE — TELEPHONE ENCOUNTER
He can start with a visit with me to discuss the memory- preferably a 40 minute in-person visit so we can do memory screening testing.      Thanks,  Chidi Valenzuela MD

## 2020-05-20 NOTE — PROGRESS NOTES
Hematology/ Oncology Telephone Visit:  May 20, 2020    Due to the concerns around COVID-19 and adhering to social distancing we conducted this visit over the telephone.      Reason for Visit:   Chief Complaint   Patient presents with     Oncology Clinic Visit     Recheck Acute myeloid leukemia in remission, Last seen here 7-       Oncologic History:    Normocytic anemia  Chris Mccauley has  history of alcoholism, cirrhosis, chronic kidney disease with baseline creatinine around 2, previous ulcerative colitis, asthma and tobacco use.  with a history of AML diagnosed in approximately June '08 who underwent chemotherapy induction with 7+3 and then 5+2 and subsequent four cycles of high dose casandra-C, completed in 12/08.  The patient presented with 2 episodes of bright red blood per rectum.  He recently was hospitalized at [facility] for melena.  He needed 3 units of blood.  He had an EGD or something that was clipped.  I believe it was a varix.  He had a repeat EGD with Dr. Case and 02/08/2018 without evidence of active bleeding.  There were no varices noted upon admission.  His hemoglobin was 7.4.  His hemoglobin dropped to 6.6 and he was given 1 unit of packed cells.  Two hours after he received the packed cells he had an acute fever and emesis.  Transfusion reaction workup was done and ultimately, the pathologist felt that there was no hemolytic transfusion reaction.  He continues to have shortness of breath on minimal exertion.  He has episodes of black stools.  He is unable to tolerate oral iron.  He denies any fever or chills.  He denies any nausea or vomiting or diarrhea.  Serum ferritin on 19 2018 was 15.  Iron binding capacity 361.  B12 is 645.  Stool for occult blood came back negative.      Interval History:  Patient is known with liver cirrhosis, esophageal varices with chronic blood loss.  He has been treated with blood transfusion and parenteral iron in the past.  His main complaint currently is  increasing fatigue.  He is scheduled to have a hip surgery in the next couple of weeks.  He denies any black stools or nausea or vomiting or heartburn.  He denies any recent weight loss.  He continues to drink excessively.    Review of systems:  Pertinent positives have been included in HPI; remainder of detailed complete 20-point ROS was negative.    Past medical, social, surgical, and family histories reviewed.    Allergies:  Allergies as of 05/20/2020 - Reviewed 05/20/2020   Allergen Reaction Noted     Blood transfusion related (informational only)  03/17/2018     Famotidine Unknown and Other (See Comments) 06/27/2008     Cyclobenzaprine Hives 03/18/2017     Methocarbamol Other (See Comments) 02/22/2016     Pepcid Cramps 06/08/2009     Vancomycin Other (See Comments) 08/11/2008     Vfend Other (See Comments) 06/08/2009     Hydrocodone-acetaminophen Rash 09/28/2016       Current Medications:  Current Outpatient Medications   Medication Sig Dispense Refill     acetaminophen 500 MG PO tablet Take 1-2 tablets (500-1,000 mg) by mouth every 6 hours as needed for mild pain Do not take more than 3000mg acetaminophen total in one day. 100 tablet 3     albuterol (VENTOLIN HFA) 108 (90 Base) MCG/ACT inhaler Inhale 2 puffs into the lungs every 4 hours as needed for shortness of breath / dyspnea or wheezing 18 g 11     atorvastatin 20 MG PO tablet Take 1 tablet (20 mg) by mouth daily 90 tablet 3     baclofen (LIORESAL) 10 MG tablet TAKE 1/2 TO 1 TABLET BY MOUTH UP TO THREE TIMES A DAY (Patient taking differently: Take 10 mg by mouth 3 times daily ) 90 tablet 3     buPROPion (WELLBUTRIN SR) 150 MG 12 hr tablet Take once per day for 3 days and then increase to twice per day (Patient taking differently: Take 150 mg by mouth 2 times daily ) 60 tablet 12     diclofenac (VOLTAREN) 1 % topical gel Place 2 g onto the skin 4 times daily as needed for moderate pain 100 g 3     docusate sodium (COLACE) 100 MG tablet Take 1 tablet (100  mg) by mouth 2 times daily as needed for constipation 60 tablet 3     folic acid (FOLVITE) 1 MG tablet Take 2 tablets (2 mg) by mouth daily 60 tablet 1     furosemide (LASIX) 20 MG tablet Take 1 tablet (20 mg) by mouth every morning 90 tablet 3     gabapentin (NEURONTIN) 300 MG capsule Take 2 capsules (600 mg) by mouth 3 times daily (Patient taking differently: Take 900 mg by mouth 3 times daily ) 180 capsule 11     loratadine (CLARITIN) 10 MG tablet Take 1 tablet (10 mg) by mouth daily as needed for allergies (Patient taking differently: Take 10 mg by mouth 2 times daily ) 30 tablet 11     mometasone-formoterol (DULERA) 200-5 MCG/ACT inhaler Inhale 2 puffs into the lungs 2 times daily 1 Inhaler 0     montelukast (SINGULAIR) 10 MG tablet Take 1 tablet (10 mg) by mouth At Bedtime 90 tablet 3     nicotine 2 MG MT lozenge Place 1 lozenge (2 mg) inside cheek every hour as needed for smoking cessation 108 lozenge 11     nystatin (MYCOSTATIN) 078940 UNIT/ML suspension TAKE 5 MLS BY MOUTH FOUR TIMES A DAY FOR 10 DAYS 200 mL 0     order for DME Equipment being ordered: 4 wheel walker 1 Units 0     order for DME Equipment being ordered: 2 pairs thigh high compression stockings, strength per patient preference 2 Units 1     order for DME Equipment being ordered: Compression Stockings TWO (2) Pairs, 15-20 mm Hg. 2 Package prn     order for DME Equipment being ordered: bed pull up bar 1 Units 0     order for DME Equipment being ordered: shower chair 1 Device 0     pantoprazole (PROTONIX) 40 MG EC tablet Take 1 tablet (40 mg) by mouth 2 times daily 120 tablet 0     polyethylene glycol (MIRALAX) 17 g packet Take 17 g by mouth daily 30 packet 11     propranolol (INDERAL) 20 MG tablet Take 1 tablet (20 mg) by mouth 2 times daily 180 tablet 3     rifaximin (XIFAXAN) 550 MG TABS tablet Take 1 tablet (550 mg) by mouth 2 times daily 180 tablet 3     spironolactone (ALDACTONE) 50 MG tablet Take 50 mg by mouth daily       tiotropium  (SPIRIVA) 18 MCG capsule Inhale 1 capsule into the lungs daily Using PRN       traMADol (ULTRAM) 50 MG tablet Take 1 tablet (50 mg) by mouth every 6 hours as needed for severe pain Max 4 per day, prescription to last at least 1 month 120 tablet 0     traZODone (DESYREL) 50 MG tablet TAKE ONE TABLET BY MOUTH ONCE DAILY AT BEDTIME 90 tablet 3     trolamine salicylate (ASPERCREME) 10 % external cream Apply topically as needed for moderate pain 80 g 1        Laboratory/Imaging Studies:  Orders Only on 05/13/2020   Component Date Value Ref Range Status     WBC 05/13/2020 8.0  4.0 - 11.0 10e9/L Final     RBC Count 05/13/2020 2.95* 4.4 - 5.9 10e12/L Final     Hemoglobin 05/13/2020 7.8* 13.3 - 17.7 g/dL Final    Comment: Critical Value called to and read back by  MARLI RODRÍGUEZ RN AT 1433 ON 5/13/20 BY LS  Results confirmed by repeat test       Hematocrit 05/13/2020 26.1* 40.0 - 53.0 % Final     MCV 05/13/2020 89  78 - 100 fl Final     MCH 05/13/2020 26.4* 26.5 - 33.0 pg Final     MCHC 05/13/2020 29.9* 31.5 - 36.5 g/dL Final     RDW 05/13/2020 21.5* 10.0 - 15.0 % Final     Platelet Count 05/13/2020 150  150 - 450 10e9/L Final     Ferritin 05/13/2020 15* 26 - 388 ng/mL Final     Sodium 05/13/2020 140  133 - 144 mmol/L Final     Potassium 05/13/2020 4.0  3.4 - 5.3 mmol/L Final     Chloride 05/13/2020 111* 94 - 109 mmol/L Final     Carbon Dioxide 05/13/2020 21  20 - 32 mmol/L Final     Anion Gap 05/13/2020 8  3 - 14 mmol/L Final     Glucose 05/13/2020 73  70 - 99 mg/dL Final     Urea Nitrogen 05/13/2020 25  7 - 30 mg/dL Final     Creatinine 05/13/2020 2.27* 0.66 - 1.25 mg/dL Final     GFR Estimate 05/13/2020 30* >60 mL/min/[1.73_m2] Final    Comment: Non  GFR Calc  Starting 12/18/2018, serum creatinine based estimated GFR (eGFR) will be   calculated using the Chronic Kidney Disease Epidemiology Collaboration   (CKD-EPI) equation.       GFR Estimate If Black 05/13/2020 35* >60 mL/min/[1.73_m2] Final    Comment:   GFR Calc  Starting 12/18/2018, serum creatinine based estimated GFR (eGFR) will be   calculated using the Chronic Kidney Disease Epidemiology Collaboration   (CKD-EPI) equation.       Calcium 05/13/2020 8.9  8.5 - 10.1 mg/dL Final     Vitamin B12 05/13/2020 418  193 - 986 pg/mL Final     Folate 05/13/2020 4.1* >5.4 ng/mL Final     Iron 05/13/2020 16* 35 - 180 ug/dL Final     Iron Binding Cap 05/13/2020 357  240 - 430 ug/dL Final     Iron Saturation Index 05/13/2020 4* 15 - 46 % Final   Office Visit on 05/12/2020   Component Date Value Ref Range Status     WBC 05/12/2020 9.5  4.0 - 11.0 10e9/L Final     RBC Count 05/12/2020 2.95* 4.4 - 5.9 10e12/L Final     Hemoglobin 05/12/2020 7.9* 13.3 - 17.7 g/dL Final     Hematocrit 05/12/2020 26.0* 40.0 - 53.0 % Final     MCV 05/12/2020 88  78 - 100 fl Final     MCH 05/12/2020 26.8  26.5 - 33.0 pg Final     MCHC 05/12/2020 30.4* 31.5 - 36.5 g/dL Final     RDW 05/12/2020 21.6* 10.0 - 15.0 % Final     Platelet Count 05/12/2020 178  150 - 450 10e9/L Final   Orders Only on 05/12/2020   Component Date Value Ref Range Status     Color Urine 05/12/2020 Yellow   Final     Appearance Urine 05/12/2020 Clear   Final     Glucose Urine 05/12/2020 Negative  NEG^Negative mg/dL Final     Bilirubin Urine 05/12/2020 Negative  NEG^Negative Final     Ketones Urine 05/12/2020 Negative  NEG^Negative mg/dL Final     Specific Gravity Urine 05/12/2020 1.010  1.003 - 1.035 Final     pH Urine 05/12/2020 6.0  5.0 - 7.0 pH Final     Protein Albumin Urine 05/12/2020 Negative  NEG^Negative mg/dL Final     Urobilinogen Urine 05/12/2020 0.2  0.2 - 1.0 EU/dL Final     Nitrite Urine 05/12/2020 Negative  NEG^Negative Final     Blood Urine 05/12/2020 Trace* NEG^Negative Final     Leukocyte Esterase Urine 05/12/2020 Large* NEG^Negative Final     Source 05/12/2020 Midstream Urine   Final     WBC Urine 05/12/2020 25-50* OTO5^0 - 5 /HPF Final     RBC Urine 05/12/2020 2-5* OTO2^O - 2 /HPF Final      Squamous Epithelial /LPF Urine 05/12/2020 Few  FEW^Few /LPF Final     Bacteria Urine 05/12/2020 Few* NEG^Negative /HPF Final     Specimen Description 05/12/2020 Midstream Urine   Final     Special Requests 05/12/2020 Specimen received in preservative   Final     Culture Micro 05/12/2020    Final                    Value:10,000 to 50,000 colonies/mL  mixed urogenital juani  Susceptibility testing not routinely done     Orders Only on 05/07/2020   Component Date Value Ref Range Status     WBC 05/07/2020 13.8* 4.0 - 11.0 10e9/L Final     RBC Count 05/07/2020 3.26* 4.4 - 5.9 10e12/L Final     Hemoglobin 05/07/2020 8.7* 13.3 - 17.7 g/dL Final     Hematocrit 05/07/2020 29.1* 40.0 - 53.0 % Final     MCV 05/07/2020 89  78 - 100 fl Final     MCH 05/07/2020 26.7  26.5 - 33.0 pg Final     MCHC 05/07/2020 29.9* 31.5 - 36.5 g/dL Final     RDW 05/07/2020 22.6* 10.0 - 15.0 % Final     Platelet Count 05/07/2020 138* 150 - 450 10e9/L Final     Color Urine 05/07/2020 Yellow   Final     Appearance Urine 05/07/2020 Slightly Cloudy   Final     Glucose Urine 05/07/2020 Negative  NEG^Negative mg/dL Final     Bilirubin Urine 05/07/2020 Negative  NEG^Negative Final     Ketones Urine 05/07/2020 Negative  NEG^Negative mg/dL Final     Specific Gravity Urine 05/07/2020 1.015  1.003 - 1.035 Final     pH Urine 05/07/2020 5.0  5.0 - 7.0 pH Final     Protein Albumin Urine 05/07/2020 Negative  NEG^Negative mg/dL Final     Urobilinogen Urine 05/07/2020 0.2  0.2 - 1.0 EU/dL Final     Nitrite Urine 05/07/2020 Negative  NEG^Negative Final     Blood Urine 05/07/2020 Trace* NEG^Negative Final     Leukocyte Esterase Urine 05/07/2020 Large* NEG^Negative Final     Source 05/07/2020 Midstream Urine   Final     WBC Urine 05/07/2020 10-25* OTO5^0 - 5 /HPF Final     RBC Urine 05/07/2020 O - 2  OTO2^O - 2 /HPF Final     Squamous Epithelial /LPF Urine 05/07/2020 Few  FEW^Few /LPF Final     Bacteria Urine 05/07/2020 Moderate* NEG^Negative /HPF Final     Specimen  Description 05/07/2020 Midstream Urine   Final     Special Requests 05/07/2020 Specimen received in preservative   Final     Culture Micro 05/07/2020    Final                    Value:<10,000 colonies/mL  mixed urogenital juani  Susceptibility testing not routinely done          Recent Results (from the past 744 hour(s))   XR Foot Right G/E 3 Views    Narrative    RIGHT FOOT THREE OR MORE VIEWS, RIGHT ANKLE THREE OR MORE VIEWS  5/12/2020 2:28 PM     HISTORY: Pain in joint, ankle and foot, right.    FINDINGS: Plantar calcaneal spurring. Marked first MTP osteoarthritis.  There is a small chronic appearing fracture at the inferior aspect of  the lateral malleolus. There could also be an old posterior malleolar  fracture.      Impression    IMPRESSION: No acute fracture identified.    BRIGIDO CHRISTIAN MD   XR Ankle Right G/E 3 Views    Narrative    RIGHT FOOT THREE OR MORE VIEWS, RIGHT ANKLE THREE OR MORE VIEWS  5/12/2020 2:28 PM     HISTORY: Pain in joint, ankle and foot, right.    FINDINGS: Plantar calcaneal spurring. Marked first MTP osteoarthritis.  There is a small chronic appearing fracture at the inferior aspect of  the lateral malleolus. There could also be an old posterior malleolar  fracture.      Impression    IMPRESSION: No acute fracture identified.    BRIGIDO CHRISTIAN MD       Assessment and plan:    (C92.01) Acute myeloid leukemia in remission (H)  (primary encounter diagnosis)  There is no evidence of recurrence of leukemia.    (D50.0) Iron deficiency anemia due to chronic blood loss  Patient iron deficiency anemia probably secondary to chronic blood loss.  I will recommend to check stool for occult blood.  We will also arrange for blood smear.  We will arrange for iron infusion and monitor the ferritin level.  I would like the patient also investigated for source of blood loss.    (K70.30) Alcoholic cirrhosis of liver without ascites (H)  I strongly emphasized the importance of abstaining from alcohol  use.    (D69.6) Thrombocytopenia (H)  Platelet count has been stable.    (N18.3) CKD (chronic kidney disease) stage 3, GFR 30-59 ml/min (H)  We will continue to monitor hemoglobin.  His anemia probably also related to anemia of chronic disease secondary to mild renal impairment.    (I85.11) Secondary esophageal varices with bleeding (H)  I will recommend patient to see gastroenterology for management and follow-up    The patient is ready to learn, no apparent learning barriers were identified.  Diagnosis and treatment plans were explained to the patient. The patient expressed understanding of the content. The patient asked appropriate questions. The patient questions were answered to his satisfaction.    Telephone call lasted 40 minutes.    Dangelo Rowe MD    Chart documentation with Dragon Voice recognition Software. Although reviewed after completion, some words and grammatical errors may remain.

## 2020-05-20 NOTE — TELEPHONE ENCOUNTER
Attempted to contact patient. No answer and mailbox is full at this time.     LEONARD AntonN, RN

## 2020-05-20 NOTE — PATIENT INSTRUCTIONS
Check blood smear.  Check stool for occult blood  Arrange for iron infusion.  Start oral folate and B12  Arrange for 1 unit of packed RBCs.  Follow-up as needed

## 2020-05-20 NOTE — TELEPHONE ENCOUNTER
"Dr. Valenzuela,    Patient is contacted about his memory.  He states its been ongoing but getting worse.  Patient is alert to P/P/T.  States he is worse with words,  if you gave him a task to do he can not remember it. Patient states he has slurred speech but I can understand him perfectly.  When asked about trouble walking he states yes he has scheduled hip surgery.  Patient does tell me about upcoming infusions he needs to have.  Patient states he uses marijuana, and ETOH.  He uses this for pain.  The only thing helping him.  Denies street drugs. Patient goes onto tell this RN he has been approved for medical marijuana but can not get it.  \"I am being put off and I am not going to take it any longer\".  Patient has upcoming surgery scheduled for 5/26/20.  Patients memory seems ok to me on certain things.  He remembers dates and upcoming events. Neurology consult?  Please advise. Catherine WANG RN    "

## 2020-05-20 NOTE — ASSESSMENT & PLAN NOTE
Chris Mccauley has  history of alcoholism, cirrhosis, chronic kidney disease with baseline creatinine around 2, previous ulcerative colitis, asthma and tobacco use.  with a history of AML diagnosed in approximately June '08 who underwent chemotherapy induction with 7+3 and then 5+2 and subsequent four cycles of high dose casandra-C, completed in 12/08.  The patient presented with 2 episodes of bright red blood per rectum.  He recently was hospitalized at [facility] for melena.  He needed 3 units of blood.  He had an EGD or something that was clipped.  I believe it was a varix.  He had a repeat EGD with Dr. Case and 02/08/2018 without evidence of active bleeding.  There were no varices noted upon admission.  His hemoglobin was 7.4.  His hemoglobin dropped to 6.6 and he was given 1 unit of packed cells.  Two hours after he received the packed cells he had an acute fever and emesis.  Transfusion reaction workup was done and ultimately, the pathologist felt that there was no hemolytic transfusion reaction.  He continues to have shortness of breath on minimal exertion.  He has episodes of black stools.  He is unable to tolerate oral iron.  He denies any fever or chills.  He denies any nausea or vomiting or diarrhea.  Serum ferritin on 19 2018 was 15.  Iron binding capacity 361.  B12 is 645.  Stool for occult blood came back negative.

## 2020-05-20 NOTE — TELEPHONE ENCOUNTER
This was already ordered. Patient will be contacted by the Central scheduling team for his appointment for testing.     Patient notified.      CARMELINA Thao, RN

## 2020-05-20 NOTE — PROGRESS NOTES
"Abhijeet Mccauley is a 59 year old male who is being evaluated via a billable telephone visit.      The patient has been notified of following:     \"This telephone visit will be conducted via a call between you and your physician/provider. We have found that certain health care needs can be provided without the need for a physical exam.  This service lets us provide the care you need with a short phone conversation.  If a prescription is necessary we can send it directly to your pharmacy.  If lab work is needed we can place an order for that and you can then stop by our lab to have the test done at a later time.    Telephone visits are billed at different rates depending on your insurance coverage. During this emergency period, for some insurers they may be billed the same as an in-person visit.  Please reach out to your insurance provider with any questions.    If during the course of the call the physician/provider feels a telephone visit is not appropriate, you will not be charged for this service.\"    Patient has given verbal consent for Telephone visit?  Yes    What phone number would you like to be contacted at?  [phone]    How would you like to obtain your AVS? Mail a copy    Phone call duration:8 minutes  Charleen Vuong CMA      "

## 2020-05-20 NOTE — LETTER
"    5/20/2020         RE: Abhijeet Mccauley  [address] [place] [id number]        Dear Colleague,    Thank you for referring your patient, Abhijeet Mccauley, to the [facility]. Please see a copy of my visit note below.    Abhijeet Mccauley is a 59 year old male who is being evaluated via a billable telephone visit.      The patient has been notified of following:     \"This telephone visit will be conducted via a call between you and your physician/provider. We have found that certain health care needs can be provided without the need for a physical exam.  This service lets us provide the care you need with a short phone conversation.  If a prescription is necessary we can send it directly to your pharmacy.  If lab work is needed we can place an order for that and you can then stop by our lab to have the test done at a later time.    Telephone visits are billed at different rates depending on your insurance coverage. During this emergency period, for some insurers they may be billed the same as an in-person visit.  Please reach out to your insurance provider with any questions.    If during the course of the call the physician/provider feels a telephone visit is not appropriate, you will not be charged for this service.\"    Patient has given verbal consent for Telephone visit?  Yes    What phone number would you like to be contacted at?  [phone]    How would you like to obtain your AVS? Mail a copy    Phone call duration:8 minutes  Charleen Vuong [facility]        Hematology/ Oncology Telephone Visit:  May 20, 2020    Due to the concerns around COVID-19 and adhering to social distancing we conducted this visit over the telephone.      Reason for Visit:   Chief Complaint   Patient presents with     Oncology Clinic Visit     Recheck Acute myeloid leukemia in remission, Last seen here 7-       Oncologic History:    Normocytic anemia  Chris Mccauley has  history of alcoholism, cirrhosis, chronic kidney " disease with baseline creatinine around 2, previous ulcerative colitis, asthma and tobacco use.  with a history of AML diagnosed in approximately June '08 who underwent chemotherapy induction with 7+3 and then 5+2 and subsequent four cycles of high dose casandra-C, completed in 12/08.  The patient presented with 2 episodes of bright red blood per rectum.  He recently was hospitalized at [facility] for melena.  He needed 3 units of blood.  He had an EGD or something that was clipped.  I believe it was a varix.  He had a repeat EGD with Dr. Case and 02/08/2018 without evidence of active bleeding.  There were no varices noted upon admission.  His hemoglobin was 7.4.  His hemoglobin dropped to 6.6 and he was given 1 unit of packed cells.  Two hours after he received the packed cells he had an acute fever and emesis.  Transfusion reaction workup was done and ultimately, the pathologist felt that there was no hemolytic transfusion reaction.  He continues to have shortness of breath on minimal exertion.  He has episodes of black stools.  He is unable to tolerate oral iron.  He denies any fever or chills.  He denies any nausea or vomiting or diarrhea.  Serum ferritin on 19 2018 was 15.  Iron binding capacity 361.  B12 is 645.  Stool for occult blood came back negative.      Interval History:  Patient is known with liver cirrhosis, esophageal varices with chronic blood loss.  He has been treated with blood transfusion and parenteral iron in the past.  His main complaint currently is increasing fatigue.  He is scheduled to have a hip surgery in the next couple of weeks.  He denies any black stools or nausea or vomiting or heartburn.  He denies any recent weight loss.  He continues to drink excessively.    Review of systems:  Pertinent positives have been included in HPI; remainder of detailed complete 20-point ROS was negative.    Past medical, social, surgical, and family histories reviewed.    Allergies:  Allergies as of  05/20/2020 - Reviewed 05/20/2020   Allergen Reaction Noted     Blood transfusion related (informational only)  03/17/2018     Famotidine Unknown and Other (See Comments) 06/27/2008     Cyclobenzaprine Hives 03/18/2017     Methocarbamol Other (See Comments) 02/22/2016     Pepcid Cramps 06/08/2009     Vancomycin Other (See Comments) 08/11/2008     Vfend Other (See Comments) 06/08/2009     Hydrocodone-acetaminophen Rash 09/28/2016       Current Medications:  Current Outpatient Medications   Medication Sig Dispense Refill     acetaminophen 500 MG PO tablet Take 1-2 tablets (500-1,000 mg) by mouth every 6 hours as needed for mild pain Do not take more than 3000mg acetaminophen total in one day. 100 tablet 3     albuterol (VENTOLIN HFA) 108 (90 Base) MCG/ACT inhaler Inhale 2 puffs into the lungs every 4 hours as needed for shortness of breath / dyspnea or wheezing 18 g 11     atorvastatin 20 MG PO tablet Take 1 tablet (20 mg) by mouth daily 90 tablet 3     baclofen (LIORESAL) 10 MG tablet TAKE 1/2 TO 1 TABLET BY MOUTH UP TO THREE TIMES A DAY (Patient taking differently: Take 10 mg by mouth 3 times daily ) 90 tablet 3     buPROPion (WELLBUTRIN SR) 150 MG 12 hr tablet Take once per day for 3 days and then increase to twice per day (Patient taking differently: Take 150 mg by mouth 2 times daily ) 60 tablet 12     diclofenac (VOLTAREN) 1 % topical gel Place 2 g onto the skin 4 times daily as needed for moderate pain 100 g 3     docusate sodium (COLACE) 100 MG tablet Take 1 tablet (100 mg) by mouth 2 times daily as needed for constipation 60 tablet 3     folic acid (FOLVITE) 1 MG tablet Take 2 tablets (2 mg) by mouth daily 60 tablet 1     furosemide (LASIX) 20 MG tablet Take 1 tablet (20 mg) by mouth every morning 90 tablet 3     gabapentin (NEURONTIN) 300 MG capsule Take 2 capsules (600 mg) by mouth 3 times daily (Patient taking differently: Take 900 mg by mouth 3 times daily ) 180 capsule 11     loratadine (CLARITIN) 10 MG  tablet Take 1 tablet (10 mg) by mouth daily as needed for allergies (Patient taking differently: Take 10 mg by mouth 2 times daily ) 30 tablet 11     mometasone-formoterol (DULERA) 200-5 MCG/ACT inhaler Inhale 2 puffs into the lungs 2 times daily 1 Inhaler 0     montelukast (SINGULAIR) 10 MG tablet Take 1 tablet (10 mg) by mouth At Bedtime 90 tablet 3     nicotine 2 MG MT lozenge Place 1 lozenge (2 mg) inside cheek every hour as needed for smoking cessation 108 lozenge 11     nystatin (MYCOSTATIN) 382306 UNIT/ML suspension TAKE 5 MLS BY MOUTH FOUR TIMES A DAY FOR 10 DAYS 200 mL 0     order for DME Equipment being ordered: 4 wheel walker 1 Units 0     order for DME Equipment being ordered: 2 pairs thigh high compression stockings, strength per patient preference 2 Units 1     order for DME Equipment being ordered: Compression Stockings TWO (2) Pairs, 15-20 mm Hg. 2 Package prn     order for DME Equipment being ordered: bed pull up bar 1 Units 0     order for DME Equipment being ordered: shower chair 1 Device 0     pantoprazole (PROTONIX) 40 MG EC tablet Take 1 tablet (40 mg) by mouth 2 times daily 120 tablet 0     polyethylene glycol (MIRALAX) 17 g packet Take 17 g by mouth daily 30 packet 11     propranolol (INDERAL) 20 MG tablet Take 1 tablet (20 mg) by mouth 2 times daily 180 tablet 3     rifaximin (XIFAXAN) 550 MG TABS tablet Take 1 tablet (550 mg) by mouth 2 times daily 180 tablet 3     spironolactone (ALDACTONE) 50 MG tablet Take 50 mg by mouth daily       tiotropium (SPIRIVA) 18 MCG capsule Inhale 1 capsule into the lungs daily Using PRN       traMADol (ULTRAM) 50 MG tablet Take 1 tablet (50 mg) by mouth every 6 hours as needed for severe pain Max 4 per day, prescription to last at least 1 month 120 tablet 0     traZODone (DESYREL) 50 MG tablet TAKE ONE TABLET BY MOUTH ONCE DAILY AT BEDTIME 90 tablet 3     trolamine salicylate (ASPERCREME) 10 % external cream Apply topically as needed for moderate pain 80 g 1         Laboratory/Imaging Studies:  Orders Only on 05/13/2020   Component Date Value Ref Range Status     WBC 05/13/2020 8.0  4.0 - 11.0 10e9/L Final     RBC Count 05/13/2020 2.95* 4.4 - 5.9 10e12/L Final     Hemoglobin 05/13/2020 7.8* 13.3 - 17.7 g/dL Final    Comment: Critical Value called to and read back by  MARLI RODRÍGUEZ RN AT 1433 ON 5/13/20 BY LS  Results confirmed by repeat test       Hematocrit 05/13/2020 26.1* 40.0 - 53.0 % Final     MCV 05/13/2020 89  78 - 100 fl Final     MCH 05/13/2020 26.4* 26.5 - 33.0 pg Final     MCHC 05/13/2020 29.9* 31.5 - 36.5 g/dL Final     RDW 05/13/2020 21.5* 10.0 - 15.0 % Final     Platelet Count 05/13/2020 150  150 - 450 10e9/L Final     Ferritin 05/13/2020 15* 26 - 388 ng/mL Final     Sodium 05/13/2020 140  133 - 144 mmol/L Final     Potassium 05/13/2020 4.0  3.4 - 5.3 mmol/L Final     Chloride 05/13/2020 111* 94 - 109 mmol/L Final     Carbon Dioxide 05/13/2020 21  20 - 32 mmol/L Final     Anion Gap 05/13/2020 8  3 - 14 mmol/L Final     Glucose 05/13/2020 73  70 - 99 mg/dL Final     Urea Nitrogen 05/13/2020 25  7 - 30 mg/dL Final     Creatinine 05/13/2020 2.27* 0.66 - 1.25 mg/dL Final     GFR Estimate 05/13/2020 30* >60 mL/min/[1.73_m2] Final    Comment: Non  GFR Calc  Starting 12/18/2018, serum creatinine based estimated GFR (eGFR) will be   calculated using the Chronic Kidney Disease Epidemiology Collaboration   (CKD-EPI) equation.       GFR Estimate If Black 05/13/2020 35* >60 mL/min/[1.73_m2] Final    Comment:  GFR Calc  Starting 12/18/2018, serum creatinine based estimated GFR (eGFR) will be   calculated using the Chronic Kidney Disease Epidemiology Collaboration   (CKD-EPI) equation.       Calcium 05/13/2020 8.9  8.5 - 10.1 mg/dL Final     Vitamin B12 05/13/2020 418  193 - 986 pg/mL Final     Folate 05/13/2020 4.1* >5.4 ng/mL Final     Iron 05/13/2020 16* 35 - 180 ug/dL Final     Iron Binding Cap 05/13/2020 357  240 - 430 ug/dL Final     Iron  Saturation Index 05/13/2020 4* 15 - 46 % Final   Office Visit on 05/12/2020   Component Date Value Ref Range Status     WBC 05/12/2020 9.5  4.0 - 11.0 10e9/L Final     RBC Count 05/12/2020 2.95* 4.4 - 5.9 10e12/L Final     Hemoglobin 05/12/2020 7.9* 13.3 - 17.7 g/dL Final     Hematocrit 05/12/2020 26.0* 40.0 - 53.0 % Final     MCV 05/12/2020 88  78 - 100 fl Final     MCH 05/12/2020 26.8  26.5 - 33.0 pg Final     MCHC 05/12/2020 30.4* 31.5 - 36.5 g/dL Final     RDW 05/12/2020 21.6* 10.0 - 15.0 % Final     Platelet Count 05/12/2020 178  150 - 450 10e9/L Final   Orders Only on 05/12/2020   Component Date Value Ref Range Status     Color Urine 05/12/2020 Yellow   Final     Appearance Urine 05/12/2020 Clear   Final     Glucose Urine 05/12/2020 Negative  NEG^Negative mg/dL Final     Bilirubin Urine 05/12/2020 Negative  NEG^Negative Final     Ketones Urine 05/12/2020 Negative  NEG^Negative mg/dL Final     Specific Gravity Urine 05/12/2020 1.010  1.003 - 1.035 Final     pH Urine 05/12/2020 6.0  5.0 - 7.0 pH Final     Protein Albumin Urine 05/12/2020 Negative  NEG^Negative mg/dL Final     Urobilinogen Urine 05/12/2020 0.2  0.2 - 1.0 EU/dL Final     Nitrite Urine 05/12/2020 Negative  NEG^Negative Final     Blood Urine 05/12/2020 Trace* NEG^Negative Final     Leukocyte Esterase Urine 05/12/2020 Large* NEG^Negative Final     Source 05/12/2020 Midstream Urine   Final     WBC Urine 05/12/2020 25-50* OTO5^0 - 5 /HPF Final     RBC Urine 05/12/2020 2-5* OTO2^O - 2 /HPF Final     Squamous Epithelial /LPF Urine 05/12/2020 Few  FEW^Few /LPF Final     Bacteria Urine 05/12/2020 Few* NEG^Negative /HPF Final     Specimen Description 05/12/2020 Midstream Urine   Final     Special Requests 05/12/2020 Specimen received in preservative   Final     Culture Micro 05/12/2020    Final                    Value:10,000 to 50,000 colonies/mL  mixed urogenital juani  Susceptibility testing not routinely done     Orders Only on 05/07/2020   Component  Date Value Ref Range Status     WBC 05/07/2020 13.8* 4.0 - 11.0 10e9/L Final     RBC Count 05/07/2020 3.26* 4.4 - 5.9 10e12/L Final     Hemoglobin 05/07/2020 8.7* 13.3 - 17.7 g/dL Final     Hematocrit 05/07/2020 29.1* 40.0 - 53.0 % Final     MCV 05/07/2020 89  78 - 100 fl Final     MCH 05/07/2020 26.7  26.5 - 33.0 pg Final     MCHC 05/07/2020 29.9* 31.5 - 36.5 g/dL Final     RDW 05/07/2020 22.6* 10.0 - 15.0 % Final     Platelet Count 05/07/2020 138* 150 - 450 10e9/L Final     Color Urine 05/07/2020 Yellow   Final     Appearance Urine 05/07/2020 Slightly Cloudy   Final     Glucose Urine 05/07/2020 Negative  NEG^Negative mg/dL Final     Bilirubin Urine 05/07/2020 Negative  NEG^Negative Final     Ketones Urine 05/07/2020 Negative  NEG^Negative mg/dL Final     Specific Gravity Urine 05/07/2020 1.015  1.003 - 1.035 Final     pH Urine 05/07/2020 5.0  5.0 - 7.0 pH Final     Protein Albumin Urine 05/07/2020 Negative  NEG^Negative mg/dL Final     Urobilinogen Urine 05/07/2020 0.2  0.2 - 1.0 EU/dL Final     Nitrite Urine 05/07/2020 Negative  NEG^Negative Final     Blood Urine 05/07/2020 Trace* NEG^Negative Final     Leukocyte Esterase Urine 05/07/2020 Large* NEG^Negative Final     Source 05/07/2020 Midstream Urine   Final     WBC Urine 05/07/2020 10-25* OTO5^0 - 5 /HPF Final     RBC Urine 05/07/2020 O - 2  OTO2^O - 2 /HPF Final     Squamous Epithelial /LPF Urine 05/07/2020 Few  FEW^Few /LPF Final     Bacteria Urine 05/07/2020 Moderate* NEG^Negative /HPF Final     Specimen Description 05/07/2020 Midstream Urine   Final     Special Requests 05/07/2020 Specimen received in preservative   Final     Culture Micro 05/07/2020    Final                    Value:<10,000 colonies/mL  mixed urogenital juani  Susceptibility testing not routinely done          Recent Results (from the past 744 hour(s))   XR Foot Right G/E 3 Views    Narrative    RIGHT FOOT THREE OR MORE VIEWS, RIGHT ANKLE THREE OR MORE VIEWS  5/12/2020 2:28 PM     HISTORY:  Pain in joint, ankle and foot, right.    FINDINGS: Plantar calcaneal spurring. Marked first MTP osteoarthritis.  There is a small chronic appearing fracture at the inferior aspect of  the lateral malleolus. There could also be an old posterior malleolar  fracture.      Impression    IMPRESSION: No acute fracture identified.    BRIGIDO CHRISTIAN MD   XR Ankle Right G/E 3 Views    Narrative    RIGHT FOOT THREE OR MORE VIEWS, RIGHT ANKLE THREE OR MORE VIEWS  5/12/2020 2:28 PM     HISTORY: Pain in joint, ankle and foot, right.    FINDINGS: Plantar calcaneal spurring. Marked first MTP osteoarthritis.  There is a small chronic appearing fracture at the inferior aspect of  the lateral malleolus. There could also be an old posterior malleolar  fracture.      Impression    IMPRESSION: No acute fracture identified.    BRIGIDO CHRISTIAN MD       Assessment and plan:    (C92.01) Acute myeloid leukemia in remission (H)  (primary encounter diagnosis)  There is no evidence of recurrence of leukemia.    (D50.0) Iron deficiency anemia due to chronic blood loss  Patient iron deficiency anemia probably secondary to chronic blood loss.  I will recommend to check stool for occult blood.  We will also arrange for blood smear.  We will arrange for iron infusion and monitor the ferritin level.  I would like the patient also investigated for source of blood loss.    (K70.30) Alcoholic cirrhosis of liver without ascites (H)  I strongly emphasized the importance of abstaining from alcohol use.    (D69.6) Thrombocytopenia (H)  Platelet count has been stable.    (N18.3) CKD (chronic kidney disease) stage 3, GFR 30-59 ml/min (H)  We will continue to monitor hemoglobin.  His anemia probably also related to anemia of chronic disease secondary to mild renal impairment.    (I85.11) Secondary esophageal varices with bleeding (H)  I will recommend patient to see gastroenterology for management and follow-up    The patient is ready to learn, no apparent learning  barriers were identified.  Diagnosis and treatment plans were explained to the patient. The patient expressed understanding of the content. The patient asked appropriate questions. The patient questions were answered to his satisfaction.    Telephone call lasted 40 minutes.    Dangelo Rowe MD    Chart documentation with Dragon Voice recognition Software. Although reviewed after completion, some words and grammatical errors may remain.                                                    Again, thank you for allowing me to participate in the care of your patient.        Sincerely,        Dangelo Rowe MD

## 2020-05-20 NOTE — LETTER
"    5/20/2020         RE: Abhijeet Mccauley  [address] [place] [id number]        Dear Colleague,    Thank you for referring your patient, Abhijeet Mccauley, to the [facility] [place]. Please see a copy of my visit note below.    Abhijeet Mccauley is a 59 year old male who is being evaluated via a billable telephone visit.      The patient has been notified of following:     \"This telephone visit will be conducted via a call between you and your physician/provider. We have found that certain health care needs can be provided without the need for a physical exam.  This service lets us provide the care you need with a short phone conversation.  If a prescription is necessary we can send it directly to your pharmacy.  If lab work is needed we can place an order for that and you can then stop by our lab to have the test done at a later time.    Telephone visits are billed at different rates depending on your insurance coverage. During this emergency period, for some insurers they may be billed the same as an in-person visit.  Please reach out to your insurance provider with any questions.    If during the course of the call the physician/provider feels a telephone visit is not appropriate, you will not be charged for this service.\"    Patient has given verbal consent for Telephone visit?  Yes    What phone number would you like to be contacted at?  [phone]    How would you like to obtain your AVS? Mail a copy    Phone call duration:8 minutes  Charleen Vuong [facility]        Hematology/ Oncology Telephone Visit:  May 20, 2020    Due to the concerns around COVID-19 and adhering to social distancing we conducted this visit over the telephone.      Reason for Visit:   Chief Complaint   Patient presents with     Oncology Clinic Visit     Recheck Acute myeloid leukemia in remission, Last seen here 7-       Oncologic History:    Normocytic anemia  Chris Mccauley has  history of alcoholism, cirrhosis, chronic kidney " disease with baseline creatinine around 2, previous ulcerative colitis, asthma and tobacco use.  with a history of AML diagnosed in approximately June '08 who underwent chemotherapy induction with 7+3 and then 5+2 and subsequent four cycles of high dose casandra-C, completed in 12/08.  The patient presented with 2 episodes of bright red blood per rectum.  He recently was hospitalized at [facility] for melena.  He needed 3 units of blood.  He had an EGD or something that was clipped.  I believe it was a varix.  He had a repeat EGD with Dr. Case and 02/08/2018 without evidence of active bleeding.  There were no varices noted upon admission.  His hemoglobin was 7.4.  His hemoglobin dropped to 6.6 and he was given 1 unit of packed cells.  Two hours after he received the packed cells he had an acute fever and emesis.  Transfusion reaction workup was done and ultimately, the pathologist felt that there was no hemolytic transfusion reaction.  He continues to have shortness of breath on minimal exertion.  He has episodes of black stools.  He is unable to tolerate oral iron.  He denies any fever or chills.  He denies any nausea or vomiting or diarrhea.  Serum ferritin on 19 2018 was 15.  Iron binding capacity 361.  B12 is 645.  Stool for occult blood came back negative.      Interval History:  Patient is known with liver cirrhosis, esophageal varices with chronic blood loss.  He has been treated with blood transfusion and parenteral iron in the past.  His main complaint currently is increasing fatigue.  He is scheduled to have a hip surgery in the next couple of weeks.  He denies any black stools or nausea or vomiting or heartburn.  He denies any recent weight loss.  He continues to drink excessively.    Review of systems:  Pertinent positives have been included in HPI; remainder of detailed complete 20-point ROS was negative.    Past medical, social, surgical, and family histories reviewed.    Allergies:  Allergies as of  05/20/2020 - Reviewed 05/20/2020   Allergen Reaction Noted     Blood transfusion related (informational only)  03/17/2018     Famotidine Unknown and Other (See Comments) 06/27/2008     Cyclobenzaprine Hives 03/18/2017     Methocarbamol Other (See Comments) 02/22/2016     Pepcid Cramps 06/08/2009     Vancomycin Other (See Comments) 08/11/2008     Vfend Other (See Comments) 06/08/2009     Hydrocodone-acetaminophen Rash 09/28/2016       Current Medications:  Current Outpatient Medications   Medication Sig Dispense Refill     acetaminophen 500 MG PO tablet Take 1-2 tablets (500-1,000 mg) by mouth every 6 hours as needed for mild pain Do not take more than 3000mg acetaminophen total in one day. 100 tablet 3     albuterol (VENTOLIN HFA) 108 (90 Base) MCG/ACT inhaler Inhale 2 puffs into the lungs every 4 hours as needed for shortness of breath / dyspnea or wheezing 18 g 11     atorvastatin 20 MG PO tablet Take 1 tablet (20 mg) by mouth daily 90 tablet 3     baclofen (LIORESAL) 10 MG tablet TAKE 1/2 TO 1 TABLET BY MOUTH UP TO THREE TIMES A DAY (Patient taking differently: Take 10 mg by mouth 3 times daily ) 90 tablet 3     buPROPion (WELLBUTRIN SR) 150 MG 12 hr tablet Take once per day for 3 days and then increase to twice per day (Patient taking differently: Take 150 mg by mouth 2 times daily ) 60 tablet 12     diclofenac (VOLTAREN) 1 % topical gel Place 2 g onto the skin 4 times daily as needed for moderate pain 100 g 3     docusate sodium (COLACE) 100 MG tablet Take 1 tablet (100 mg) by mouth 2 times daily as needed for constipation 60 tablet 3     folic acid (FOLVITE) 1 MG tablet Take 2 tablets (2 mg) by mouth daily 60 tablet 1     furosemide (LASIX) 20 MG tablet Take 1 tablet (20 mg) by mouth every morning 90 tablet 3     gabapentin (NEURONTIN) 300 MG capsule Take 2 capsules (600 mg) by mouth 3 times daily (Patient taking differently: Take 900 mg by mouth 3 times daily ) 180 capsule 11     loratadine (CLARITIN) 10 MG  tablet Take 1 tablet (10 mg) by mouth daily as needed for allergies (Patient taking differently: Take 10 mg by mouth 2 times daily ) 30 tablet 11     mometasone-formoterol (DULERA) 200-5 MCG/ACT inhaler Inhale 2 puffs into the lungs 2 times daily 1 Inhaler 0     montelukast (SINGULAIR) 10 MG tablet Take 1 tablet (10 mg) by mouth At Bedtime 90 tablet 3     nicotine 2 MG MT lozenge Place 1 lozenge (2 mg) inside cheek every hour as needed for smoking cessation 108 lozenge 11     nystatin (MYCOSTATIN) 499671 UNIT/ML suspension TAKE 5 MLS BY MOUTH FOUR TIMES A DAY FOR 10 DAYS 200 mL 0     order for DME Equipment being ordered: 4 wheel walker 1 Units 0     order for DME Equipment being ordered: 2 pairs thigh high compression stockings, strength per patient preference 2 Units 1     order for DME Equipment being ordered: Compression Stockings TWO (2) Pairs, 15-20 mm Hg. 2 Package prn     order for DME Equipment being ordered: bed pull up bar 1 Units 0     order for DME Equipment being ordered: shower chair 1 Device 0     pantoprazole (PROTONIX) 40 MG EC tablet Take 1 tablet (40 mg) by mouth 2 times daily 120 tablet 0     polyethylene glycol (MIRALAX) 17 g packet Take 17 g by mouth daily 30 packet 11     propranolol (INDERAL) 20 MG tablet Take 1 tablet (20 mg) by mouth 2 times daily 180 tablet 3     rifaximin (XIFAXAN) 550 MG TABS tablet Take 1 tablet (550 mg) by mouth 2 times daily 180 tablet 3     spironolactone (ALDACTONE) 50 MG tablet Take 50 mg by mouth daily       tiotropium (SPIRIVA) 18 MCG capsule Inhale 1 capsule into the lungs daily Using PRN       traMADol (ULTRAM) 50 MG tablet Take 1 tablet (50 mg) by mouth every 6 hours as needed for severe pain Max 4 per day, prescription to last at least 1 month 120 tablet 0     traZODone (DESYREL) 50 MG tablet TAKE ONE TABLET BY MOUTH ONCE DAILY AT BEDTIME 90 tablet 3     trolamine salicylate (ASPERCREME) 10 % external cream Apply topically as needed for moderate pain 80 g 1         Laboratory/Imaging Studies:  Orders Only on 05/13/2020   Component Date Value Ref Range Status     WBC 05/13/2020 8.0  4.0 - 11.0 10e9/L Final     RBC Count 05/13/2020 2.95* 4.4 - 5.9 10e12/L Final     Hemoglobin 05/13/2020 7.8* 13.3 - 17.7 g/dL Final    Comment: Critical Value called to and read back by  MARLI RODRÍGUEZ RN AT 1433 ON 5/13/20 BY LS  Results confirmed by repeat test       Hematocrit 05/13/2020 26.1* 40.0 - 53.0 % Final     MCV 05/13/2020 89  78 - 100 fl Final     MCH 05/13/2020 26.4* 26.5 - 33.0 pg Final     MCHC 05/13/2020 29.9* 31.5 - 36.5 g/dL Final     RDW 05/13/2020 21.5* 10.0 - 15.0 % Final     Platelet Count 05/13/2020 150  150 - 450 10e9/L Final     Ferritin 05/13/2020 15* 26 - 388 ng/mL Final     Sodium 05/13/2020 140  133 - 144 mmol/L Final     Potassium 05/13/2020 4.0  3.4 - 5.3 mmol/L Final     Chloride 05/13/2020 111* 94 - 109 mmol/L Final     Carbon Dioxide 05/13/2020 21  20 - 32 mmol/L Final     Anion Gap 05/13/2020 8  3 - 14 mmol/L Final     Glucose 05/13/2020 73  70 - 99 mg/dL Final     Urea Nitrogen 05/13/2020 25  7 - 30 mg/dL Final     Creatinine 05/13/2020 2.27* 0.66 - 1.25 mg/dL Final     GFR Estimate 05/13/2020 30* >60 mL/min/[1.73_m2] Final    Comment: Non  GFR Calc  Starting 12/18/2018, serum creatinine based estimated GFR (eGFR) will be   calculated using the Chronic Kidney Disease Epidemiology Collaboration   (CKD-EPI) equation.       GFR Estimate If Black 05/13/2020 35* >60 mL/min/[1.73_m2] Final    Comment:  GFR Calc  Starting 12/18/2018, serum creatinine based estimated GFR (eGFR) will be   calculated using the Chronic Kidney Disease Epidemiology Collaboration   (CKD-EPI) equation.       Calcium 05/13/2020 8.9  8.5 - 10.1 mg/dL Final     Vitamin B12 05/13/2020 418  193 - 986 pg/mL Final     Folate 05/13/2020 4.1* >5.4 ng/mL Final     Iron 05/13/2020 16* 35 - 180 ug/dL Final     Iron Binding Cap 05/13/2020 357  240 - 430 ug/dL Final     Iron  Saturation Index 05/13/2020 4* 15 - 46 % Final   Office Visit on 05/12/2020   Component Date Value Ref Range Status     WBC 05/12/2020 9.5  4.0 - 11.0 10e9/L Final     RBC Count 05/12/2020 2.95* 4.4 - 5.9 10e12/L Final     Hemoglobin 05/12/2020 7.9* 13.3 - 17.7 g/dL Final     Hematocrit 05/12/2020 26.0* 40.0 - 53.0 % Final     MCV 05/12/2020 88  78 - 100 fl Final     MCH 05/12/2020 26.8  26.5 - 33.0 pg Final     MCHC 05/12/2020 30.4* 31.5 - 36.5 g/dL Final     RDW 05/12/2020 21.6* 10.0 - 15.0 % Final     Platelet Count 05/12/2020 178  150 - 450 10e9/L Final   Orders Only on 05/12/2020   Component Date Value Ref Range Status     Color Urine 05/12/2020 Yellow   Final     Appearance Urine 05/12/2020 Clear   Final     Glucose Urine 05/12/2020 Negative  NEG^Negative mg/dL Final     Bilirubin Urine 05/12/2020 Negative  NEG^Negative Final     Ketones Urine 05/12/2020 Negative  NEG^Negative mg/dL Final     Specific Gravity Urine 05/12/2020 1.010  1.003 - 1.035 Final     pH Urine 05/12/2020 6.0  5.0 - 7.0 pH Final     Protein Albumin Urine 05/12/2020 Negative  NEG^Negative mg/dL Final     Urobilinogen Urine 05/12/2020 0.2  0.2 - 1.0 EU/dL Final     Nitrite Urine 05/12/2020 Negative  NEG^Negative Final     Blood Urine 05/12/2020 Trace* NEG^Negative Final     Leukocyte Esterase Urine 05/12/2020 Large* NEG^Negative Final     Source 05/12/2020 Midstream Urine   Final     WBC Urine 05/12/2020 25-50* OTO5^0 - 5 /HPF Final     RBC Urine 05/12/2020 2-5* OTO2^O - 2 /HPF Final     Squamous Epithelial /LPF Urine 05/12/2020 Few  FEW^Few /LPF Final     Bacteria Urine 05/12/2020 Few* NEG^Negative /HPF Final     Specimen Description 05/12/2020 Midstream Urine   Final     Special Requests 05/12/2020 Specimen received in preservative   Final     Culture Micro 05/12/2020    Final                    Value:10,000 to 50,000 colonies/mL  mixed urogenital juani  Susceptibility testing not routinely done     Orders Only on 05/07/2020   Component  Date Value Ref Range Status     WBC 05/07/2020 13.8* 4.0 - 11.0 10e9/L Final     RBC Count 05/07/2020 3.26* 4.4 - 5.9 10e12/L Final     Hemoglobin 05/07/2020 8.7* 13.3 - 17.7 g/dL Final     Hematocrit 05/07/2020 29.1* 40.0 - 53.0 % Final     MCV 05/07/2020 89  78 - 100 fl Final     MCH 05/07/2020 26.7  26.5 - 33.0 pg Final     MCHC 05/07/2020 29.9* 31.5 - 36.5 g/dL Final     RDW 05/07/2020 22.6* 10.0 - 15.0 % Final     Platelet Count 05/07/2020 138* 150 - 450 10e9/L Final     Color Urine 05/07/2020 Yellow   Final     Appearance Urine 05/07/2020 Slightly Cloudy   Final     Glucose Urine 05/07/2020 Negative  NEG^Negative mg/dL Final     Bilirubin Urine 05/07/2020 Negative  NEG^Negative Final     Ketones Urine 05/07/2020 Negative  NEG^Negative mg/dL Final     Specific Gravity Urine 05/07/2020 1.015  1.003 - 1.035 Final     pH Urine 05/07/2020 5.0  5.0 - 7.0 pH Final     Protein Albumin Urine 05/07/2020 Negative  NEG^Negative mg/dL Final     Urobilinogen Urine 05/07/2020 0.2  0.2 - 1.0 EU/dL Final     Nitrite Urine 05/07/2020 Negative  NEG^Negative Final     Blood Urine 05/07/2020 Trace* NEG^Negative Final     Leukocyte Esterase Urine 05/07/2020 Large* NEG^Negative Final     Source 05/07/2020 Midstream Urine   Final     WBC Urine 05/07/2020 10-25* OTO5^0 - 5 /HPF Final     RBC Urine 05/07/2020 O - 2  OTO2^O - 2 /HPF Final     Squamous Epithelial /LPF Urine 05/07/2020 Few  FEW^Few /LPF Final     Bacteria Urine 05/07/2020 Moderate* NEG^Negative /HPF Final     Specimen Description 05/07/2020 Midstream Urine   Final     Special Requests 05/07/2020 Specimen received in preservative   Final     Culture Micro 05/07/2020    Final                    Value:<10,000 colonies/mL  mixed urogenital juani  Susceptibility testing not routinely done          Recent Results (from the past 744 hour(s))   XR Foot Right G/E 3 Views    Narrative    RIGHT FOOT THREE OR MORE VIEWS, RIGHT ANKLE THREE OR MORE VIEWS  5/12/2020 2:28 PM     HISTORY:  Pain in joint, ankle and foot, right.    FINDINGS: Plantar calcaneal spurring. Marked first MTP osteoarthritis.  There is a small chronic appearing fracture at the inferior aspect of  the lateral malleolus. There could also be an old posterior malleolar  fracture.      Impression    IMPRESSION: No acute fracture identified.    BRIGIDO CHRISTIAN MD   XR Ankle Right G/E 3 Views    Narrative    RIGHT FOOT THREE OR MORE VIEWS, RIGHT ANKLE THREE OR MORE VIEWS  5/12/2020 2:28 PM     HISTORY: Pain in joint, ankle and foot, right.    FINDINGS: Plantar calcaneal spurring. Marked first MTP osteoarthritis.  There is a small chronic appearing fracture at the inferior aspect of  the lateral malleolus. There could also be an old posterior malleolar  fracture.      Impression    IMPRESSION: No acute fracture identified.    BRIGIDO CHRISTIAN MD       Assessment and plan:    (C92.01) Acute myeloid leukemia in remission (H)  (primary encounter diagnosis)  There is no evidence of recurrence of leukemia.    (D50.0) Iron deficiency anemia due to chronic blood loss  Patient iron deficiency anemia probably secondary to chronic blood loss.  I will recommend to check stool for occult blood.  We will also arrange for blood smear.  We will arrange for iron infusion and monitor the ferritin level.  I would like the patient also investigated for source of blood loss.    (K70.30) Alcoholic cirrhosis of liver without ascites (H)  I strongly emphasized the importance of abstaining from alcohol use.    (D69.6) Thrombocytopenia (H)  Platelet count has been stable.    (N18.3) CKD (chronic kidney disease) stage 3, GFR 30-59 ml/min (H)  We will continue to monitor hemoglobin.  His anemia probably also related to anemia of chronic disease secondary to mild renal impairment.    (I85.11) Secondary esophageal varices with bleeding (H)  I will recommend patient to see gastroenterology for management and follow-up    The patient is ready to learn, no apparent learning  barriers were identified.  Diagnosis and treatment plans were explained to the patient. The patient expressed understanding of the content. The patient asked appropriate questions. The patient questions were answered to his satisfaction.    Telephone call lasted 40 minutes.    Dangelo Rowe MD    Chart documentation with Dragon Voice recognition Software. Although reviewed after completion, some words and grammatical errors may remain.                                                    Again, thank you for allowing me to participate in the care of your patient.        Sincerely,        Dangelo Rowe MD

## 2020-05-21 ENCOUNTER — TELEPHONE (OUTPATIENT)
Dept: FAMILY MEDICINE | Facility: CLINIC | Age: 60
End: 2020-05-21

## 2020-05-21 ENCOUNTER — HOSPITAL ENCOUNTER (OUTPATIENT)
Facility: CLINIC | Age: 60
Discharge: HOME | End: 2020-05-21
Attending: INTERNAL MEDICINE | Admitting: INTERNAL MEDICINE
Payer: COMMERCIAL

## 2020-05-21 PROCEDURE — 82270 OCCULT BLOOD FECES: CPT | Performed by: INTERNAL MEDICINE

## 2020-05-21 NOTE — TELEPHONE ENCOUNTER
Left non-detailed message for Fabiana FUCHS at [facility], [phone], for patient to return a call to the clinic RN.   JESU Gramajo RN

## 2020-05-21 NOTE — TELEPHONE ENCOUNTER
Reason for Call:  Upcoming surgery issues    Detailed comments: Fabiana HERNANDEZ RN from Surgery is calling and stating that she just spoke with the Patient and no one has called him about his Covid Test, so it is done properly in accordance to the surgery rules. He also stated to the RN that he is a drinker and plans on drinking right up to Monday the day before surgery and he will probably go in to [facility] while recovering. Fabiana is wondering if his primary and [facility] is aware, and should he have his surgery?  She wanted someone to know this issue.  Lia Leal  [facility]

## 2020-05-21 NOTE — TELEPHONE ENCOUNTER
We've been told multiple times that the surgical team is to be contacting him about the COVID testing- we have nothing to do with setting that up through the primary care department.  I'm not sure why surgery is contact us about that- can someone please try to clarify this with his surgical team? It does appear that they placed an order yesterday.    I am well aware of his drinking issue and discussed this with his surgical team back in March and they were okay with proceeding.    Thanks,  Chidi Valenzuela MD

## 2020-05-22 ENCOUNTER — PATIENT OUTREACH (OUTPATIENT)
Dept: ONCOLOGY | Facility: CLINIC | Age: 60
End: 2020-05-22

## 2020-05-22 ENCOUNTER — ANESTHESIA EVENT (OUTPATIENT)
Dept: SURGERY | Facility: CLINIC | Age: 60
End: 2020-05-22
Payer: COMMERCIAL

## 2020-05-22 ENCOUNTER — HOSPITAL ENCOUNTER (OUTPATIENT)
Dept: LAB | Facility: CLINIC | Age: 60
Discharge: HOME | End: 2020-05-22
Attending: INTERNAL MEDICINE | Admitting: INTERNAL MEDICINE
Payer: COMMERCIAL

## 2020-05-22 ENCOUNTER — INFUSION THERAPY VISIT (OUTPATIENT)
Dept: INFUSION THERAPY | Facility: CLINIC | Age: 60
End: 2020-05-22
Attending: INTERNAL MEDICINE
Payer: COMMERCIAL

## 2020-05-22 VITALS — DIASTOLIC BLOOD PRESSURE: 60 MMHG | SYSTOLIC BLOOD PRESSURE: 131 MMHG | TEMPERATURE: 96.8 F | HEART RATE: 68 BPM

## 2020-05-22 DIAGNOSIS — I85.11 SECONDARY ESOPHAGEAL VARICES WITH BLEEDING (H): ICD-10-CM

## 2020-05-22 DIAGNOSIS — D50.0 IRON DEFICIENCY ANEMIA DUE TO CHRONIC BLOOD LOSS: Primary | ICD-10-CM

## 2020-05-22 DIAGNOSIS — D64.9 NORMOCYTIC ANEMIA: ICD-10-CM

## 2020-05-22 DIAGNOSIS — D64.9 LOW HEMOGLOBIN: Primary | ICD-10-CM

## 2020-05-22 LAB
ANISOCYTOSIS BLD QL SMEAR: ABNORMAL
APTT PPP: 35 SEC (ref 22–37)
BASOPHILS # BLD AUTO: 0.1 10E9/L (ref 0–0.2)
BASOPHILS NFR BLD AUTO: 0.7 %
DIFFERENTIAL METHOD BLD: ABNORMAL
EOSINOPHIL # BLD AUTO: 0.4 10E9/L (ref 0–0.7)
EOSINOPHIL NFR BLD AUTO: 2.7 %
ERYTHROCYTE [DISTWIDTH] IN BLOOD BY AUTOMATED COUNT: 21.4 % (ref 10–15)
HCT VFR BLD AUTO: 27.6 % (ref 40–53)
HGB BLD-MCNC: 8.1 G/DL (ref 13.3–17.7)
IMM GRANULOCYTES # BLD: 0.1 10E9/L (ref 0–0.4)
IMM GRANULOCYTES NFR BLD: 0.8 %
INR PPP: 1.42 (ref 0.86–1.14)
LYMPHOCYTES # BLD AUTO: 1.4 10E9/L (ref 0.8–5.3)
LYMPHOCYTES NFR BLD AUTO: 10.9 %
MCH RBC QN AUTO: 26.6 PG (ref 26.5–33)
MCHC RBC AUTO-ENTMCNC: 29.3 G/DL (ref 31.5–36.5)
MCV RBC AUTO: 91 FL (ref 78–100)
MONOCYTES # BLD AUTO: 1.9 10E9/L (ref 0–1.3)
MONOCYTES NFR BLD AUTO: 14.2 %
NEUTROPHILS # BLD AUTO: 9.4 10E9/L (ref 1.6–8.3)
NEUTROPHILS NFR BLD AUTO: 70.7 %
NRBC # BLD AUTO: 0 10*3/UL
NRBC BLD AUTO-RTO: 0 /100
PLATELET # BLD AUTO: 165 10E9/L (ref 150–450)
PLATELET # BLD EST: ABNORMAL 10*3/UL
RBC # BLD AUTO: 3.05 10E12/L (ref 4.4–5.9)
RETICS # AUTO: 97 10E9/L (ref 25–95)
RETICS/RBC NFR AUTO: 3.2 % (ref 0.5–2)
WBC # BLD AUTO: 13.3 10E9/L (ref 4–11)

## 2020-05-22 PROCEDURE — 96365 THER/PROPH/DIAG IV INF INIT: CPT

## 2020-05-22 PROCEDURE — 25800030 ZZH RX IP 258 OP 636: Performed by: INTERNAL MEDICINE

## 2020-05-22 PROCEDURE — 25000128 H RX IP 250 OP 636: Performed by: INTERNAL MEDICINE

## 2020-05-22 PROCEDURE — 85025 COMPLETE CBC W/AUTO DIFF WBC: CPT | Performed by: INTERNAL MEDICINE

## 2020-05-22 PROCEDURE — 36415 COLL VENOUS BLD VENIPUNCTURE: CPT | Performed by: INTERNAL MEDICINE

## 2020-05-22 PROCEDURE — 40000847 ZZHCL STATISTIC MORPHOLOGY W/INTERP HISTOLOGY TC 85060: Performed by: INTERNAL MEDICINE

## 2020-05-22 PROCEDURE — 85730 THROMBOPLASTIN TIME PARTIAL: CPT | Performed by: INTERNAL MEDICINE

## 2020-05-22 PROCEDURE — 85060 BLOOD SMEAR INTERPRETATION: CPT | Performed by: INTERNAL MEDICINE

## 2020-05-22 PROCEDURE — 25000125 ZZHC RX 250: Performed by: INTERNAL MEDICINE

## 2020-05-22 PROCEDURE — 85045 AUTOMATED RETICULOCYTE COUNT: CPT | Performed by: INTERNAL MEDICINE

## 2020-05-22 PROCEDURE — 99207 ZZC NO CHARGE NURSE ONLY: CPT

## 2020-05-22 PROCEDURE — 85610 PROTHROMBIN TIME: CPT | Performed by: INTERNAL MEDICINE

## 2020-05-22 RX ORDER — HEPARIN SODIUM,PORCINE 10 UNIT/ML
5 VIAL (ML) INTRAVENOUS
Status: CANCELLED | OUTPATIENT
Start: 2020-05-22

## 2020-05-22 RX ORDER — HEPARIN SODIUM (PORCINE) LOCK FLUSH IV SOLN 100 UNIT/ML 100 UNIT/ML
5 SOLUTION INTRAVENOUS
Status: CANCELLED | OUTPATIENT
Start: 2020-05-22

## 2020-05-22 RX ADMIN — SODIUM CHLORIDE 250 ML: 9 INJECTION, SOLUTION INTRAVENOUS at 10:27

## 2020-05-22 RX ADMIN — FERRIC CARBOXYMALTOSE INJECTION 750 MG: 50 INJECTION, SOLUTION INTRAVENOUS at 10:27

## 2020-05-22 RX ADMIN — LIDOCAINE HYDROCHLORIDE 0.1 ML: 10 INJECTION, SOLUTION EPIDURAL; INFILTRATION; INTRACAUDAL; PERINEURAL at 10:06

## 2020-05-22 ASSESSMENT — COPD QUESTIONNAIRES: COPD: 1

## 2020-05-22 NOTE — PROGRESS NOTES
Patient here today for a blood transfusion for 1 unit of RBC's, however due to patients allergy and having rare antibodies in his blood, his blood wont be ready for 7 hours and therefore he cannot have this done here today at the [facility]. Spoke with infusion and they are booked up for the next 2 weeks. Also patient is scheduled for his hip surgery next week Tuesday so ideally this was to be done prior to his surgery. Spoke with the [facility] and they can get him in tomorrow, however patient declines this and states there is no way he can get down there tomorrow. Patient ok with setting up transfusion for next week at the [facility] but will be after his planned surgery day. Patient aware and wants to go ahead with scheduling the blood at the [facility] for next week. Spoke with the charge nurse Toshia at the [facility] and surgery center who was able to schedule patient to have his RBC's next week Thursday 5/28 at 12:00 noon  With a blood draw the day before for a type and cross Wed 5/27 at 10:00.  Patient given his appointment times and dates today and is in agreement with plan. Patient also given occult blood stool kit today to take home and instructions reviewed and provided to patient.  Juana Comer RN on 5/22/2020 at 10:47 AM
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
## 2020-05-22 NOTE — TELEPHONE ENCOUNTER
Pt having hip arthoplasty on 5/26.   Will follow-up with pt in a few weeks.    Chelsea Ortiz RN  Gastroenterology Care Coordinator  [place], [place]

## 2020-05-22 NOTE — TELEPHONE ENCOUNTER
I called back to O and spoke with Emma on Dr Adams Jones's team.    Shared Dr Valenzuela's reply below.  Emma updates that COVID test is addressed.  Nothing further is needed.    Will proceed with surgery as planned.    Zulema Gramajo RN

## 2020-05-23 ENCOUNTER — TELEPHONE (OUTPATIENT)
Dept: FAMILY MEDICINE | Facility: CLINIC | Age: 60
End: 2020-05-23

## 2020-05-23 DIAGNOSIS — M79.641 BILATERAL HAND PAIN: ICD-10-CM

## 2020-05-23 DIAGNOSIS — M87.9 BILATERAL HIP OSTEONECROSIS (H): ICD-10-CM

## 2020-05-23 DIAGNOSIS — M79.642 BILATERAL HAND PAIN: ICD-10-CM

## 2020-05-26 ENCOUNTER — ANESTHESIA (OUTPATIENT)
Dept: SURGERY | Facility: CLINIC | Age: 60
End: 2020-05-26
Payer: COMMERCIAL

## 2020-05-26 ENCOUNTER — TELEPHONE (OUTPATIENT)
Dept: FAMILY MEDICINE | Facility: CLINIC | Age: 60
End: 2020-05-26

## 2020-05-26 NOTE — TELEPHONE ENCOUNTER
"Patient is adamant that he is the one who is telling us what he is going to do.  He is sick and tired of being juggled around.  I have offered him an appt on Thursday with Dr. Valenzuela for his H and P.  He has a blood transfusion at 12.  We could see him at 9:40.  No he refuses. Per patient, \" There is no way he can do all these appts by Friday.\"  Advised he speak to his surgeon. Catherine WANG RN    "

## 2020-05-26 NOTE — PLAN OF CARE
I called patient to tell him that is surgery for today has been cancelled per charge nurse Zulema EVANS RN and Kristopher FUCHS CRNA. Patient stated he has been told that already. He didn't know who had called him about the cancellation. Patient has an appointment at the [facility] tomorrow 5/27 for blood work and a possible infusion he says. Patient was reminded he needs a new preop physical and a Covid test. He will call Yessy Morley NP to get that set up asap. Zulema EVANS RN charge nurse and Kristopher Riley CRNA notified.

## 2020-05-26 NOTE — TELEPHONE ENCOUNTER
Reason for Call:  Other     Detailed comments: Preop needed prior to his surgery on 5/29/20 here in [place],  Appt just changed.  Please call [facility]    Phone Number Patient can be reached at: Cell number on file:    Telephone Information:   Mobile [phone]       Best Time: any    Can we leave a detailed message on this number? YES    Call taken on 5/26/2020 at 11:39 AM by Catherine Russell

## 2020-05-27 ENCOUNTER — PATIENT OUTREACH (OUTPATIENT)
Dept: ONCOLOGY | Facility: CLINIC | Age: 60
End: 2020-05-27

## 2020-05-27 ENCOUNTER — TELEPHONE (OUTPATIENT)
Dept: FAMILY MEDICINE | Facility: CLINIC | Age: 60
End: 2020-05-27

## 2020-05-27 ENCOUNTER — HOSPITAL ENCOUNTER (OUTPATIENT)
Dept: LAB | Facility: CLINIC | Age: 60
End: 2020-05-27
Attending: INTERNAL MEDICINE
Payer: COMMERCIAL

## 2020-05-27 ENCOUNTER — TELEPHONE (OUTPATIENT)
Dept: INFUSION THERAPY | Facility: CLINIC | Age: 60
End: 2020-05-27

## 2020-05-27 ENCOUNTER — CARE COORDINATION (OUTPATIENT)
Dept: ANESTHESIOLOGY | Facility: CLINIC | Age: 60
End: 2020-05-27

## 2020-05-27 DIAGNOSIS — D64.9 NORMOCYTIC ANEMIA: ICD-10-CM

## 2020-05-27 DIAGNOSIS — M47.818 OSTEOARTHRITIS OF SPINE WITHOUT MYELOPATHY OR RADICULOPATHY, SACRAL AND SACROCOCCYGEAL REGION: ICD-10-CM

## 2020-05-27 DIAGNOSIS — D64.9 LOW HEMOGLOBIN: ICD-10-CM

## 2020-05-27 DIAGNOSIS — D64.9 NORMOCYTIC ANEMIA: Primary | ICD-10-CM

## 2020-05-27 DIAGNOSIS — Z11.59 ENCOUNTER FOR SCREENING FOR OTHER VIRAL DISEASES: ICD-10-CM

## 2020-05-27 LAB
COLLECT DATE SP2 STL: ABNORMAL
COLLECT DATE SP3 STL: ABNORMAL
COLLECT DATE STL: ABNORMAL
COPATH REPORT: NORMAL
ERYTHROCYTE [DISTWIDTH] IN BLOOD BY AUTOMATED COUNT: 23.5 % (ref 10–15)
HCT VFR BLD AUTO: 28.4 % (ref 40–53)
HEMOCCULT SP1 STL QL: POSITIVE
HEMOCCULT SP2 STL QL: POSITIVE
HEMOCCULT SP3 STL QL: POSITIVE
HGB BLD-MCNC: 8.6 G/DL (ref 13.3–17.7)
MCH RBC QN AUTO: 28.1 PG (ref 26.5–33)
MCHC RBC AUTO-ENTMCNC: 30.3 G/DL (ref 31.5–36.5)
MCV RBC AUTO: 93 FL (ref 78–100)
PLATELET # BLD AUTO: 156 10E9/L (ref 150–450)
RBC # BLD AUTO: 3.06 10E12/L (ref 4.4–5.9)
WBC # BLD AUTO: 16.1 10E9/L (ref 4–11)

## 2020-05-27 RX ORDER — TRANEXAMIC ACID 10 MG/ML
1 INJECTION, SOLUTION INTRAVENOUS
Status: CANCELLED | OUTPATIENT
Start: 2020-05-27

## 2020-05-27 RX ORDER — TRAMADOL HYDROCHLORIDE 50 MG/1
TABLET ORAL
Qty: 30 TABLET | Refills: 0 | Status: ON HOLD | OUTPATIENT
Start: 2020-05-27 | End: 2020-06-25

## 2020-05-27 NOTE — RESULT ENCOUNTER NOTE
History of esophageal varices.  Stool positive for occult blood.  Patient need to see gastroenterology.

## 2020-05-27 NOTE — TELEPHONE ENCOUNTER
Okay, that seems odd, but hopefully everything is set.    Thanks for following up with him.    Chidi Valenzuela MD

## 2020-05-27 NOTE — TELEPHONE ENCOUNTER
Small refill of tramadol sent since surgery is scheduled in just two days (though it appears he called yesterday stating he needed another preop appointment but then refused to schedule one, so he was advised to talk to his surgeon, so I'm not sure where things stand on that now... could we maybe call him again to try to clarify this?).      Post-op pain to managed by surgery team.    Thanks,  Chidi Valenzuela MD

## 2020-05-27 NOTE — TELEPHONE ENCOUNTER
"Routing refill request to provider for review/approval because:  Drug not on the [facility] refill protocol   The Voltaren gel was refilled 3-17-20, 100 g x 3 refills - this RX is out of refills.  Is about a 13 day supply. Do you want to attach more refills?    Requested Prescriptions   Pending Prescriptions Disp Refills     traMADol (ULTRAM) 50 MG tablet [Pharmacy Med Name: TRAMADOL HCL 50MG TABS] 120 tablet 0     Sig: TAKE ONE TABLET BY MOUTH EVERY 6 HOURS AS NEEDED FOR SEVERE PAIN. MAX 4 TABLETS PER DAY. MUST LAST 30 DAYS.       There is no refill protocol information for this order        diclofenac (VOLTAREN) 1 % topical gel [Pharmacy Med Name: DICLOFENAC SODIUM 1% GEL] 100 g 3     Sig: PLACE 2 GRAMS ONTO THE SKIN FOUR TIMES A DAY AS NEEDED FOR MODERATE PAIN       Topical Steroids and Nonsteroidals Protocol Passed - 5/23/2020 10:07 AM        Passed - Patient is age 6 or older        Passed - Authorizing prescriber's most recent note related to this medication read.     If refill request is for ophthalmic use, please forward request to provider for approval.          Passed - High potency steroid not ordered        Passed - Recent (12 mo) or future (30 days) visit within the authorizing provider's specialty     Patient has had an office visit with the authorizing provider or a provider within the authorizing providers department within the previous 12 mos or has a future within next 30 days. See \"Patient Info\" tab in inbasket, or \"Choose Columns\" in Meds & Orders section of the refill encounter.              Passed - Medication is active on med list                   "

## 2020-05-27 NOTE — TELEPHONE ENCOUNTER
"Routed back to Dr Valenzuela.    Pt notified.    Pt says that surgery is rescheduled for Friday, 5/29.    Pt says that surgeon told him \"Don't worry about your pre-op, I'll take care of it.\"    Zulema Gramajo RN    "

## 2020-05-27 NOTE — TELEPHONE ENCOUNTER
He was slurring his speech a little.    Reporting what he told me.    Thank you.  Zulema Gramajo RN

## 2020-05-27 NOTE — TELEPHONE ENCOUNTER
Requested Prescriptions   Pending Prescriptions Disp Refills     gabapentin (NEURONTIN) 300 MG capsule [Pharmacy Med Name: GABAPENTIN 300MG CAPS] 180 capsule 11     Sig: TAKE TWO CAPSULES BY MOUTH THREE TIMES A DAY       There is no refill protocol information for this order        Last Written Prescription Date:  5/22/19  Last Fill Quantity: 180,  # refills: 11   Last office visit: 5/12/2020 with prescribing provider:  Nicolas   Future Office Visit:

## 2020-05-27 NOTE — PROGRESS NOTES
Talked to Emma in Dr. Jones's office regarding this pt.  He is scheduled for a blood transfusion tomorrow to treat his anemia.  I explained to Emma that he will need a current H&P, COVID test and to abstain from alcohol pref 24 hr prior to surgery, although ETOH is a problem.  She will attempt to call the pt and confirm his appts w/ him.  I also had the preop RN call to notify the pt that anesthesia requires a current H&P prior to his surgery this Friday.

## 2020-05-28 ENCOUNTER — DOCUMENTATION ONLY (OUTPATIENT)
Dept: FAMILY MEDICINE | Facility: CLINIC | Age: 60
End: 2020-05-28

## 2020-05-28 ENCOUNTER — PATIENT OUTREACH (OUTPATIENT)
Dept: ONCOLOGY | Facility: CLINIC | Age: 60
End: 2020-05-28

## 2020-05-28 ENCOUNTER — TELEPHONE (OUTPATIENT)
Dept: FAMILY MEDICINE | Facility: CLINIC | Age: 60
End: 2020-05-28

## 2020-05-28 DIAGNOSIS — D64.9 NORMOCYTIC ANEMIA: Primary | ICD-10-CM

## 2020-05-28 DIAGNOSIS — R06.02 SOB (SHORTNESS OF BREATH): ICD-10-CM

## 2020-05-28 DIAGNOSIS — M87.9 BILATERAL HIP OSTEONECROSIS (H): ICD-10-CM

## 2020-05-28 DIAGNOSIS — J45.20 MILD INTERMITTENT ASTHMA WITHOUT COMPLICATION: ICD-10-CM

## 2020-05-28 LAB
ABO + RH BLD: ABNORMAL
ABO + RH BLD: ABNORMAL
BLD GP AB SCN SERPL QL: ABNORMAL
BLOOD BANK CMNT PATIENT-IMP: ABNORMAL
BLOOD BANK CMNT PATIENT-IMP: ABNORMAL
SARS-COV-2 RNA SPEC QL NAA+PROBE: NOT DETECTED
SPECIMEN EXP DATE BLD: ABNORMAL
SPECIMEN SOURCE: NORMAL

## 2020-05-28 RX ORDER — TRAMADOL HYDROCHLORIDE 50 MG/1
TABLET ORAL
Qty: 120 TABLET | Refills: 0 | OUTPATIENT
Start: 2020-05-28

## 2020-05-28 RX ORDER — GABAPENTIN 300 MG/1
CAPSULE ORAL
Qty: 180 CAPSULE | Refills: 11 | Status: ON HOLD | OUTPATIENT
Start: 2020-05-28 | End: 2020-06-16

## 2020-05-28 RX ORDER — MOMETASONE FUROATE AND FORMOTEROL FUMARATE DIHYDRATE 200; 5 UG/1; UG/1
AEROSOL RESPIRATORY (INHALATION)
Qty: 13 G | Refills: 0 | Status: ON HOLD | OUTPATIENT
Start: 2020-05-28 | End: 2020-07-18

## 2020-05-28 NOTE — TELEPHONE ENCOUNTER
Reason for Call:  Other appointment    Detailed comments: Patient is calling asking for a Pre op I asked when the surgery is he said tomorrow 5/29/2020 I told him there is one opening tomorrow at 10am and he said he would not make it to surgery. Patient wants to talk to nurse.    Phone Number Patient can be reached at: Home number on file [phone]    Best Time: any    Can we leave a detailed message on this number? YES    Call taken on 5/28/2020 at 2:40 PM by Karen Menjivar

## 2020-05-28 NOTE — TELEPHONE ENCOUNTER
I talked to his surgeon earlier today.  He said the addendum on the pre-op visit in April should be good, and I also entered a chart note about our discussion today.  He can proceed with surgery as planned tomorrow without additional pre-op visit.  Please make sure patient is aware he can present for surgery tomorrow as planned!    Thanks,  Chidi Valenzuela MD

## 2020-05-28 NOTE — TELEPHONE ENCOUNTER
Routing refill request to provider for review/approval because:  Drug not on the [facility] refill protocol     Last Written Prescription Date:  5-22-19  Last Fill Quantity: 180,  # refills: 11   Last office visit: 5/12/2020 with prescribing provider:  Dr. Eden   Future Office Visit:      Requested Prescriptions   Pending Prescriptions Disp Refills     gabapentin (NEURONTIN) 300 MG capsule [Pharmacy Med Name: GABAPENTIN 300MG CAPS] 180 capsule 11     Sig: TAKE TWO CAPSULES BY MOUTH THREE TIMES A DAY       There is no refill protocol information for this order

## 2020-05-28 NOTE — PROGRESS NOTES
Writer called and talked with pt earlier today in regards to having a current H&P done, as requested per anesthesia.  Pt was very upset on the phone, screaming and swearing at writer.  Pt stated he is refusing to go in for a pre-op and will still arrive at his scheduled time tomorrow for his surgery and then proceeded to hang up.

## 2020-05-28 NOTE — TELEPHONE ENCOUNTER
Routing refill request to provider for review/approval because:  Last ACT score < 20    ACT Total Scores 12/5/2018 2/8/2019 12/5/2019   ACT TOTAL SCORE (Goal Greater than or Equal to 20) 11 16 9   In the past 12 months, how many times did you visit the emergency room for your asthma without being admitted to the hospital? 0 0 0   In the past 12 months, how many times were you hospitalized overnight because of your asthma? 0 0 0     Angelina HDZ, RN, BSN

## 2020-05-28 NOTE — PROGRESS NOTES
Per lab request, they would like pt to come in tonight to draw labs for tomorrows surgery.  Writer spoke with pt and he stated he will not be coming in tonight for any labs, he has no way here and it is too late.  Lab updated.

## 2020-05-28 NOTE — TELEPHONE ENCOUNTER
"Routed to Dr Nicolas GURROLA.     Attempted to reach pt but no answer and unable to leave a message.  Mailbox is full.  Pt last pre-op was 4/23/20    Routed to provider.    Pt told me his pre-op was \"taken care of\" yesterday.    Zulema Gramajo RN      "

## 2020-05-29 ENCOUNTER — HOSPITAL ENCOUNTER (OUTPATIENT)
Facility: CLINIC | Age: 60
Discharge: HOME | End: 2020-05-30
Attending: ORTHOPAEDIC SURGERY | Admitting: ORTHOPAEDIC SURGERY
Payer: COMMERCIAL

## 2020-05-29 ENCOUNTER — APPOINTMENT (OUTPATIENT)
Dept: GENERAL RADIOLOGY | Facility: CLINIC | Age: 60
End: 2020-05-29
Attending: ORTHOPAEDIC SURGERY
Payer: COMMERCIAL

## 2020-05-29 DIAGNOSIS — D64.9 NORMOCYTIC ANEMIA: ICD-10-CM

## 2020-05-29 DIAGNOSIS — M87.00 AVASCULAR NECROSIS (H): ICD-10-CM

## 2020-05-29 DIAGNOSIS — Z96.642 STATUS POST TOTAL HIP REPLACEMENT, LEFT: Primary | ICD-10-CM

## 2020-05-29 LAB
ABO + RH BLD: ABNORMAL
ABO + RH BLD: ABNORMAL
BLD GP AB SCN SERPL QL: ABNORMAL
BLD PROD TYP BPU: ABNORMAL
BLOOD BANK CMNT PATIENT-IMP: ABNORMAL
ETHANOL SERPL-MCNC: 0.01 G/DL
INR PPP: 1.38 (ref 0.86–1.14)
NUM BPU REQUESTED: 2
SPECIMEN EXP DATE BLD: ABNORMAL

## 2020-05-29 PROCEDURE — 86900 BLOOD TYPING SEROLOGIC ABO: CPT | Performed by: NURSE ANESTHETIST, CERTIFIED REGISTERED

## 2020-05-29 PROCEDURE — 25000132 ZZH RX MED GY IP 250 OP 250 PS 637: Performed by: ORTHOPAEDIC SURGERY

## 2020-05-29 PROCEDURE — 36415 COLL VENOUS BLD VENIPUNCTURE: CPT | Performed by: NURSE ANESTHETIST, CERTIFIED REGISTERED

## 2020-05-29 PROCEDURE — 25000128 H RX IP 250 OP 636: Performed by: PHYSICIAN ASSISTANT

## 2020-05-29 PROCEDURE — 25000132 ZZH RX MED GY IP 250 OP 250 PS 637: Performed by: NURSE ANESTHETIST, CERTIFIED REGISTERED

## 2020-05-29 PROCEDURE — 37000009 ZZH ANESTHESIA TECHNICAL FEE, EACH ADDTL 15 MIN: Performed by: ORTHOPAEDIC SURGERY

## 2020-05-29 PROCEDURE — 27110028 ZZH OR GENERAL SUPPLY NON-STERILE: Performed by: ORTHOPAEDIC SURGERY

## 2020-05-29 PROCEDURE — 80320 DRUG SCREEN QUANTALCOHOLS: CPT | Performed by: PHYSICIAN ASSISTANT

## 2020-05-29 PROCEDURE — 36000093 ZZH SURGERY LEVEL 4 1ST 30 MIN: Performed by: ORTHOPAEDIC SURGERY

## 2020-05-29 PROCEDURE — 25000125 ZZHC RX 250: Performed by: NURSE ANESTHETIST, CERTIFIED REGISTERED

## 2020-05-29 PROCEDURE — 71000012 ZZH RECOVERY PHASE 1 LEVEL 1 FIRST HR: Performed by: ORTHOPAEDIC SURGERY

## 2020-05-29 PROCEDURE — 40000305 ZZH STATISTIC PRE PROC ASSESS I: Performed by: ORTHOPAEDIC SURGERY

## 2020-05-29 PROCEDURE — 25800030 ZZH RX IP 258 OP 636: Performed by: NURSE ANESTHETIST, CERTIFIED REGISTERED

## 2020-05-29 PROCEDURE — 40000986 XR PELVIS AND HIP PORTABLE LEFT 1 VIEW

## 2020-05-29 PROCEDURE — C1776 JOINT DEVICE (IMPLANTABLE): HCPCS | Performed by: ORTHOPAEDIC SURGERY

## 2020-05-29 PROCEDURE — 25000128 H RX IP 250 OP 636: Performed by: ORTHOPAEDIC SURGERY

## 2020-05-29 PROCEDURE — 71000013 ZZH RECOVERY PHASE 1 LEVEL 1 EA ADDTL HR: Performed by: ORTHOPAEDIC SURGERY

## 2020-05-29 PROCEDURE — 86850 RBC ANTIBODY SCREEN: CPT | Performed by: NURSE ANESTHETIST, CERTIFIED REGISTERED

## 2020-05-29 PROCEDURE — 86901 BLOOD TYPING SEROLOGIC RH(D): CPT | Performed by: NURSE ANESTHETIST, CERTIFIED REGISTERED

## 2020-05-29 PROCEDURE — 86922 COMPATIBILITY TEST ANTIGLOB: CPT | Performed by: NURSE ANESTHETIST, CERTIFIED REGISTERED

## 2020-05-29 PROCEDURE — 25000128 H RX IP 250 OP 636: Performed by: NURSE ANESTHETIST, CERTIFIED REGISTERED

## 2020-05-29 PROCEDURE — 25000132 ZZH RX MED GY IP 250 OP 250 PS 637: Performed by: PHYSICIAN ASSISTANT

## 2020-05-29 PROCEDURE — 36415 COLL VENOUS BLD VENIPUNCTURE: CPT | Performed by: PHYSICIAN ASSISTANT

## 2020-05-29 PROCEDURE — 36000063 ZZH SURGERY LEVEL 4 EA 15 ADDTL MIN: Performed by: ORTHOPAEDIC SURGERY

## 2020-05-29 PROCEDURE — 85610 PROTHROMBIN TIME: CPT | Performed by: PHYSICIAN ASSISTANT

## 2020-05-29 PROCEDURE — 27210794 ZZH OR GENERAL SUPPLY STERILE: Performed by: ORTHOPAEDIC SURGERY

## 2020-05-29 PROCEDURE — 37000008 ZZH ANESTHESIA TECHNICAL FEE, 1ST 30 MIN: Performed by: ORTHOPAEDIC SURGERY

## 2020-05-29 DEVICE — IMPLANTABLE DEVICE: Type: IMPLANTABLE DEVICE | Site: HIP | Status: FUNCTIONAL

## 2020-05-29 DEVICE — IMP INSERT HIP DEPUY PINNACLE ALTRX 36X52MM 1221-36-052: Type: IMPLANTABLE DEVICE | Site: HIP | Status: FUNCTIONAL

## 2020-05-29 DEVICE — IMP CUP ACE PINNACLE 52MM 1217-22-052: Type: IMPLANTABLE DEVICE | Site: HIP | Status: FUNCTIONAL

## 2020-05-29 RX ORDER — DESONIDE 0.5 MG/G
1 CREAM TOPICAL 2 TIMES DAILY
Status: ON HOLD | COMMUNITY
End: 2020-06-25

## 2020-05-29 RX ORDER — KETAMINE HYDROCHLORIDE 10 MG/ML
INJECTION, SOLUTION INTRAMUSCULAR; INTRAVENOUS PRN
Status: DISCONTINUED | OUTPATIENT
Start: 2020-05-29 | End: 2020-05-29

## 2020-05-29 RX ORDER — METOCLOPRAMIDE 10 MG/1
10 TABLET ORAL EVERY 6 HOURS PRN
Status: DISCONTINUED | OUTPATIENT
Start: 2020-05-29 | End: 2020-05-30 | Stop reason: HOSPADM

## 2020-05-29 RX ORDER — SODIUM CHLORIDE, SODIUM LACTATE, POTASSIUM CHLORIDE, CALCIUM CHLORIDE 600; 310; 30; 20 MG/100ML; MG/100ML; MG/100ML; MG/100ML
INJECTION, SOLUTION INTRAVENOUS CONTINUOUS
Status: DISCONTINUED | OUTPATIENT
Start: 2020-05-29 | End: 2020-05-29 | Stop reason: HOSPADM

## 2020-05-29 RX ORDER — LORAZEPAM 1 MG/1
1-2 TABLET ORAL EVERY 30 MIN PRN
Status: DISCONTINUED | OUTPATIENT
Start: 2020-05-29 | End: 2020-05-30 | Stop reason: HOSPADM

## 2020-05-29 RX ORDER — CELECOXIB 200 MG/1
200 CAPSULE ORAL ONCE
Status: COMPLETED | OUTPATIENT
Start: 2020-05-29 | End: 2020-05-29

## 2020-05-29 RX ORDER — GABAPENTIN 300 MG/1
300 CAPSULE ORAL 3 TIMES DAILY
Status: DISCONTINUED | OUTPATIENT
Start: 2020-05-29 | End: 2020-05-30 | Stop reason: HOSPADM

## 2020-05-29 RX ORDER — HYDROMORPHONE HYDROCHLORIDE 1 MG/ML
.3-.5 INJECTION, SOLUTION INTRAMUSCULAR; INTRAVENOUS; SUBCUTANEOUS EVERY 10 MIN PRN
Status: DISCONTINUED | OUTPATIENT
Start: 2020-05-29 | End: 2020-05-29 | Stop reason: HOSPADM

## 2020-05-29 RX ORDER — FOLIC ACID 1 MG/1
2 TABLET ORAL DAILY
Status: DISCONTINUED | OUTPATIENT
Start: 2020-05-30 | End: 2020-05-30 | Stop reason: HOSPADM

## 2020-05-29 RX ORDER — HYDROXYZINE HYDROCHLORIDE 25 MG/1
25-50 TABLET, FILM COATED ORAL EVERY 6 HOURS PRN
Status: DISCONTINUED | OUTPATIENT
Start: 2020-05-29 | End: 2020-05-30 | Stop reason: HOSPADM

## 2020-05-29 RX ORDER — MEPERIDINE HYDROCHLORIDE 25 MG/ML
12.5 INJECTION INTRAMUSCULAR; INTRAVENOUS; SUBCUTANEOUS
Status: DISCONTINUED | OUTPATIENT
Start: 2020-05-29 | End: 2020-05-29 | Stop reason: HOSPADM

## 2020-05-29 RX ORDER — HYDRALAZINE HYDROCHLORIDE 20 MG/ML
2.5-5 INJECTION INTRAMUSCULAR; INTRAVENOUS EVERY 10 MIN PRN
Status: DISCONTINUED | OUTPATIENT
Start: 2020-05-29 | End: 2020-05-29 | Stop reason: HOSPADM

## 2020-05-29 RX ORDER — FUROSEMIDE 20 MG
20 TABLET ORAL EVERY MORNING
Status: DISCONTINUED | OUTPATIENT
Start: 2020-05-30 | End: 2020-05-30 | Stop reason: HOSPADM

## 2020-05-29 RX ORDER — CALCIUM CARBONATE 500 MG/1
1000 TABLET, CHEWABLE ORAL 4 TIMES DAILY PRN
Status: DISCONTINUED | OUTPATIENT
Start: 2020-05-29 | End: 2020-05-30 | Stop reason: HOSPADM

## 2020-05-29 RX ORDER — SODIUM CHLORIDE, SODIUM LACTATE, POTASSIUM CHLORIDE, CALCIUM CHLORIDE 600; 310; 30; 20 MG/100ML; MG/100ML; MG/100ML; MG/100ML
INJECTION, SOLUTION INTRAVENOUS CONTINUOUS
Status: DISCONTINUED | OUTPATIENT
Start: 2020-05-29 | End: 2020-05-30 | Stop reason: HOSPADM

## 2020-05-29 RX ORDER — PROPRANOLOL HYDROCHLORIDE 20 MG/1
20 TABLET ORAL 2 TIMES DAILY
Status: DISCONTINUED | OUTPATIENT
Start: 2020-05-29 | End: 2020-05-30 | Stop reason: HOSPADM

## 2020-05-29 RX ORDER — DEXAMETHASONE SODIUM PHOSPHATE 4 MG/ML
4 INJECTION, SOLUTION INTRA-ARTICULAR; INTRALESIONAL; INTRAMUSCULAR; INTRAVENOUS; SOFT TISSUE ONCE
Status: COMPLETED | OUTPATIENT
Start: 2020-05-29 | End: 2020-05-29

## 2020-05-29 RX ORDER — FENTANYL CITRATE 50 UG/ML
25-50 INJECTION, SOLUTION INTRAMUSCULAR; INTRAVENOUS
Status: DISCONTINUED | OUTPATIENT
Start: 2020-05-29 | End: 2020-05-29 | Stop reason: HOSPADM

## 2020-05-29 RX ORDER — BUPIVACAINE HYDROCHLORIDE 7.5 MG/ML
INJECTION, SOLUTION INTRASPINAL PRN
Status: DISCONTINUED | OUTPATIENT
Start: 2020-05-29 | End: 2020-05-29

## 2020-05-29 RX ORDER — HYDROMORPHONE HYDROCHLORIDE 1 MG/ML
.3-.5 INJECTION, SOLUTION INTRAMUSCULAR; INTRAVENOUS; SUBCUTANEOUS
Status: DISCONTINUED | OUTPATIENT
Start: 2020-05-29 | End: 2020-05-30 | Stop reason: HOSPADM

## 2020-05-29 RX ORDER — MULTIPLE VITAMINS W/ MINERALS TAB 9MG-400MCG
1 TAB ORAL DAILY
Status: DISCONTINUED | OUTPATIENT
Start: 2020-05-30 | End: 2020-05-30 | Stop reason: HOSPADM

## 2020-05-29 RX ORDER — NALOXONE HYDROCHLORIDE 0.4 MG/ML
.1-.4 INJECTION, SOLUTION INTRAMUSCULAR; INTRAVENOUS; SUBCUTANEOUS
Status: DISCONTINUED | OUTPATIENT
Start: 2020-05-29 | End: 2020-05-30 | Stop reason: HOSPADM

## 2020-05-29 RX ORDER — DEXAMETHASONE SODIUM PHOSPHATE 4 MG/ML
4 INJECTION, SOLUTION INTRA-ARTICULAR; INTRALESIONAL; INTRAMUSCULAR; INTRAVENOUS; SOFT TISSUE EVERY 10 MIN PRN
Status: DISCONTINUED | OUTPATIENT
Start: 2020-05-29 | End: 2020-05-29 | Stop reason: HOSPADM

## 2020-05-29 RX ORDER — SPIRONOLACTONE 25 MG/1
50 TABLET ORAL DAILY
Status: DISCONTINUED | OUTPATIENT
Start: 2020-05-30 | End: 2020-05-30 | Stop reason: HOSPADM

## 2020-05-29 RX ORDER — MONTELUKAST SODIUM 10 MG/1
10 TABLET ORAL AT BEDTIME
Status: DISCONTINUED | OUTPATIENT
Start: 2020-05-29 | End: 2020-05-30 | Stop reason: HOSPADM

## 2020-05-29 RX ORDER — METOCLOPRAMIDE HYDROCHLORIDE 5 MG/ML
10 INJECTION INTRAMUSCULAR; INTRAVENOUS EVERY 6 HOURS PRN
Status: DISCONTINUED | OUTPATIENT
Start: 2020-05-29 | End: 2020-05-29 | Stop reason: HOSPADM

## 2020-05-29 RX ORDER — PANTOPRAZOLE SODIUM 20 MG/1
40 TABLET, DELAYED RELEASE ORAL 2 TIMES DAILY
Status: DISCONTINUED | OUTPATIENT
Start: 2020-05-29 | End: 2020-05-30 | Stop reason: HOSPADM

## 2020-05-29 RX ORDER — ONDANSETRON 2 MG/ML
4 INJECTION INTRAMUSCULAR; INTRAVENOUS EVERY 6 HOURS PRN
Status: DISCONTINUED | OUTPATIENT
Start: 2020-05-29 | End: 2020-05-30 | Stop reason: HOSPADM

## 2020-05-29 RX ORDER — POTASSIUM CHLORIDE 750 MG/1
10 TABLET, EXTENDED RELEASE ORAL DAILY
Status: ON HOLD | COMMUNITY
Start: 2020-03-30 | End: 2020-06-25

## 2020-05-29 RX ORDER — ALBUTEROL SULFATE 0.83 MG/ML
2.5 SOLUTION RESPIRATORY (INHALATION) EVERY 4 HOURS PRN
Status: DISCONTINUED | OUTPATIENT
Start: 2020-05-29 | End: 2020-05-29 | Stop reason: HOSPADM

## 2020-05-29 RX ORDER — ONDANSETRON 4 MG/1
4 TABLET, ORALLY DISINTEGRATING ORAL EVERY 30 MIN PRN
Status: DISCONTINUED | OUTPATIENT
Start: 2020-05-29 | End: 2020-05-29 | Stop reason: HOSPADM

## 2020-05-29 RX ORDER — TRAZODONE HYDROCHLORIDE 50 MG/1
50 TABLET, FILM COATED ORAL AT BEDTIME
Status: DISCONTINUED | OUTPATIENT
Start: 2020-05-29 | End: 2020-05-30 | Stop reason: HOSPADM

## 2020-05-29 RX ORDER — LORATADINE 10 MG/1
10 TABLET ORAL DAILY PRN
Status: DISCONTINUED | OUTPATIENT
Start: 2020-05-29 | End: 2020-05-30 | Stop reason: HOSPADM

## 2020-05-29 RX ORDER — LORAZEPAM 2 MG/ML
1-2 INJECTION INTRAMUSCULAR EVERY 30 MIN PRN
Status: DISCONTINUED | OUTPATIENT
Start: 2020-05-29 | End: 2020-05-30 | Stop reason: HOSPADM

## 2020-05-29 RX ORDER — LIDOCAINE HYDROCHLORIDE 10 MG/ML
INJECTION, SOLUTION INFILTRATION; PERINEURAL PRN
Status: DISCONTINUED | OUTPATIENT
Start: 2020-05-29 | End: 2020-05-29

## 2020-05-29 RX ORDER — FENTANYL CITRATE 50 UG/ML
25-50 INJECTION, SOLUTION INTRAMUSCULAR; INTRAVENOUS
Status: CANCELLED | OUTPATIENT
Start: 2020-05-29

## 2020-05-29 RX ORDER — OXYCODONE HYDROCHLORIDE 5 MG/1
5-10 TABLET ORAL
Status: DISCONTINUED | OUTPATIENT
Start: 2020-05-29 | End: 2020-05-30 | Stop reason: HOSPADM

## 2020-05-29 RX ORDER — ONDANSETRON 2 MG/ML
4 INJECTION INTRAMUSCULAR; INTRAVENOUS EVERY 30 MIN PRN
Status: DISCONTINUED | OUTPATIENT
Start: 2020-05-29 | End: 2020-05-29 | Stop reason: HOSPADM

## 2020-05-29 RX ORDER — CEFAZOLIN SODIUM 1 G/50ML
1 INJECTION, SOLUTION INTRAVENOUS SEE ADMIN INSTRUCTIONS
Status: DISCONTINUED | OUTPATIENT
Start: 2020-05-29 | End: 2020-05-29 | Stop reason: HOSPADM

## 2020-05-29 RX ORDER — NALOXONE HYDROCHLORIDE 0.4 MG/ML
.1-.4 INJECTION, SOLUTION INTRAMUSCULAR; INTRAVENOUS; SUBCUTANEOUS
Status: DISCONTINUED | OUTPATIENT
Start: 2020-05-29 | End: 2020-05-29 | Stop reason: HOSPADM

## 2020-05-29 RX ORDER — LANOLIN ALCOHOL/MO/W.PET/CERES
100 CREAM (GRAM) TOPICAL DAILY
Status: DISCONTINUED | OUTPATIENT
Start: 2020-05-30 | End: 2020-05-30 | Stop reason: HOSPADM

## 2020-05-29 RX ORDER — FENTANYL CITRATE 50 UG/ML
INJECTION, SOLUTION INTRAMUSCULAR; INTRAVENOUS PRN
Status: DISCONTINUED | OUTPATIENT
Start: 2020-05-29 | End: 2020-05-29

## 2020-05-29 RX ORDER — PROCHLORPERAZINE MALEATE 5 MG
10 TABLET ORAL EVERY 6 HOURS PRN
Status: DISCONTINUED | OUTPATIENT
Start: 2020-05-29 | End: 2020-05-30 | Stop reason: HOSPADM

## 2020-05-29 RX ORDER — ONDANSETRON 4 MG/1
4 TABLET, ORALLY DISINTEGRATING ORAL EVERY 6 HOURS PRN
Status: DISCONTINUED | OUTPATIENT
Start: 2020-05-29 | End: 2020-05-30 | Stop reason: HOSPADM

## 2020-05-29 RX ORDER — CEFAZOLIN SODIUM 2 G/100ML
2 INJECTION, SOLUTION INTRAVENOUS EVERY 8 HOURS
Status: COMPLETED | OUTPATIENT
Start: 2020-05-29 | End: 2020-05-30

## 2020-05-29 RX ORDER — METOCLOPRAMIDE HYDROCHLORIDE 5 MG/ML
10 INJECTION INTRAMUSCULAR; INTRAVENOUS EVERY 6 HOURS PRN
Status: DISCONTINUED | OUTPATIENT
Start: 2020-05-29 | End: 2020-05-30 | Stop reason: HOSPADM

## 2020-05-29 RX ORDER — BUPROPION HYDROCHLORIDE 150 MG/1
150 TABLET, EXTENDED RELEASE ORAL 2 TIMES DAILY
Status: DISCONTINUED | OUTPATIENT
Start: 2020-05-29 | End: 2020-05-30 | Stop reason: HOSPADM

## 2020-05-29 RX ORDER — BACLOFEN 10 MG/1
10 TABLET ORAL 3 TIMES DAILY
Status: DISCONTINUED | OUTPATIENT
Start: 2020-05-29 | End: 2020-05-30 | Stop reason: HOSPADM

## 2020-05-29 RX ORDER — ACETAMINOPHEN 325 MG/1
975 TABLET ORAL EVERY 8 HOURS
Status: DISCONTINUED | OUTPATIENT
Start: 2020-05-29 | End: 2020-05-30 | Stop reason: HOSPADM

## 2020-05-29 RX ORDER — METOCLOPRAMIDE 10 MG/1
10 TABLET ORAL EVERY 6 HOURS PRN
Status: DISCONTINUED | OUTPATIENT
Start: 2020-05-29 | End: 2020-05-29 | Stop reason: HOSPADM

## 2020-05-29 RX ORDER — ASPIRIN 81 MG/1
81 TABLET ORAL DAILY
Status: DISCONTINUED | OUTPATIENT
Start: 2020-05-30 | End: 2020-05-30 | Stop reason: HOSPADM

## 2020-05-29 RX ORDER — CEFAZOLIN SODIUM 2 G/100ML
2 INJECTION, SOLUTION INTRAVENOUS
Status: COMPLETED | OUTPATIENT
Start: 2020-05-29 | End: 2020-05-29

## 2020-05-29 RX ORDER — PROPOFOL 10 MG/ML
INJECTION, EMULSION INTRAVENOUS CONTINUOUS PRN
Status: DISCONTINUED | OUTPATIENT
Start: 2020-05-29 | End: 2020-05-29

## 2020-05-29 RX ORDER — LIDOCAINE 40 MG/G
CREAM TOPICAL
Status: DISCONTINUED | OUTPATIENT
Start: 2020-05-29 | End: 2020-05-30 | Stop reason: HOSPADM

## 2020-05-29 RX ORDER — LIDOCAINE 40 MG/G
CREAM TOPICAL
Status: DISCONTINUED | OUTPATIENT
Start: 2020-05-29 | End: 2020-05-29 | Stop reason: HOSPADM

## 2020-05-29 RX ORDER — ALBUTEROL SULFATE 5 MG/ML
2.5 SOLUTION RESPIRATORY (INHALATION)
Status: DISCONTINUED | OUTPATIENT
Start: 2020-05-29 | End: 2020-05-30 | Stop reason: HOSPADM

## 2020-05-29 RX ORDER — ATORVASTATIN CALCIUM 20 MG/1
20 TABLET, FILM COATED ORAL DAILY
Status: DISCONTINUED | OUTPATIENT
Start: 2020-05-30 | End: 2020-05-30 | Stop reason: HOSPADM

## 2020-05-29 RX ADMIN — OXYCODONE HYDROCHLORIDE 10 MG: 5 TABLET ORAL at 20:36

## 2020-05-29 RX ADMIN — LIDOCAINE HYDROCHLORIDE 0.1 ML: 10 INJECTION, SOLUTION EPIDURAL; INFILTRATION; INTRACAUDAL; PERINEURAL at 11:28

## 2020-05-29 RX ADMIN — OXYCODONE HYDROCHLORIDE 5 MG: 5 TABLET ORAL at 17:35

## 2020-05-29 RX ADMIN — PANTOPRAZOLE SODIUM 40 MG: 20 TABLET, DELAYED RELEASE ORAL at 20:19

## 2020-05-29 RX ADMIN — ACETAMINOPHEN 975 MG: 325 TABLET, FILM COATED ORAL at 17:31

## 2020-05-29 RX ADMIN — BUPIVACAINE HYDROCHLORIDE IN DEXTROSE 1.6 ML: 7.5 INJECTION, SOLUTION SUBARACHNOID at 13:05

## 2020-05-29 RX ADMIN — CEFAZOLIN SODIUM 2 G: 2 INJECTION, SOLUTION INTRAVENOUS at 13:15

## 2020-05-29 RX ADMIN — CELECOXIB 200 MG: 200 CAPSULE ORAL at 11:28

## 2020-05-29 RX ADMIN — PROPOFOL 25 MCG/KG/MIN: 10 INJECTION, EMULSION INTRAVENOUS at 13:24

## 2020-05-29 RX ADMIN — CEFAZOLIN SODIUM 2 G: 2 INJECTION, SOLUTION INTRAVENOUS at 20:40

## 2020-05-29 RX ADMIN — SODIUM CHLORIDE, POTASSIUM CHLORIDE, SODIUM LACTATE AND CALCIUM CHLORIDE: 600; 310; 30; 20 INJECTION, SOLUTION INTRAVENOUS at 11:28

## 2020-05-29 RX ADMIN — SODIUM CHLORIDE, POTASSIUM CHLORIDE, SODIUM LACTATE AND CALCIUM CHLORIDE: 600; 310; 30; 20 INJECTION, SOLUTION INTRAVENOUS at 14:28

## 2020-05-29 RX ADMIN — FENTANYL CITRATE 100 MCG: 50 INJECTION, SOLUTION INTRAMUSCULAR; INTRAVENOUS at 12:56

## 2020-05-29 RX ADMIN — MIDAZOLAM 2 MG: 1 INJECTION INTRAMUSCULAR; INTRAVENOUS at 12:53

## 2020-05-29 RX ADMIN — DEXAMETHASONE SODIUM PHOSPHATE 4 MG: 4 INJECTION, SOLUTION INTRAMUSCULAR; INTRAVENOUS at 17:31

## 2020-05-29 RX ADMIN — KETAMINE HYDROCHLORIDE 25 MG: 10 INJECTION INTRAMUSCULAR; INTRAVENOUS at 13:15

## 2020-05-29 RX ADMIN — MIDAZOLAM 2 MG: 1 INJECTION INTRAMUSCULAR; INTRAVENOUS at 13:02

## 2020-05-29 RX ADMIN — KETAMINE HYDROCHLORIDE 25 MG: 10 INJECTION INTRAMUSCULAR; INTRAVENOUS at 13:18

## 2020-05-29 RX ADMIN — FLUTICASONE FUROATE AND VILANTEROL TRIFENATATE 1 PUFF: 200; 25 POWDER RESPIRATORY (INHALATION) at 20:22

## 2020-05-29 RX ADMIN — LIDOCAINE HYDROCHLORIDE 30 MG: 10 INJECTION, SOLUTION INFILTRATION; PERINEURAL at 13:05

## 2020-05-29 RX ADMIN — GABAPENTIN 300 MG: 300 CAPSULE ORAL at 20:19

## 2020-05-29 SDOH — HEALTH STABILITY: MENTAL HEALTH: CURRENT SMOKER: 1

## 2020-05-29 ASSESSMENT — ACTIVITIES OF DAILY LIVING (ADL)
AMBULATION: 1-->ASSISTIVE EQUIPMENT
SWALLOWING: 0-->SWALLOWS FOODS/LIQUIDS WITHOUT DIFFICULTY
RETIRED_EATING: 0-->INDEPENDENT
TOILETING: 0-->INDEPENDENT
BATHING: 0-->INDEPENDENT
FALL_HISTORY_WITHIN_LAST_SIX_MONTHS: YES
COGNITION: 0 - NO COGNITION ISSUES REPORTED
DRESS: 0-->INDEPENDENT
TRANSFERRING: 0-->INDEPENDENT
RETIRED_COMMUNICATION: 0-->UNDERSTANDS/COMMUNICATES WITHOUT DIFFICULTY
WHICH_OF_THE_ABOVE_FUNCTIONAL_RISKS_HAD_A_RECENT_ONSET_OR_CHANGE?: AMBULATION
NUMBER_OF_TIMES_PATIENT_HAS_FALLEN_WITHIN_LAST_SIX_MONTHS: 4

## 2020-05-29 ASSESSMENT — LIFESTYLE VARIABLES: TOBACCO_USE: 1

## 2020-05-29 ASSESSMENT — MIFFLIN-ST. JEOR: SCORE: 1620.93

## 2020-05-29 NOTE — PROCEDURES
05/29/20    PREOP DIAGNOSIS: Left femoral head avascular necrosis  POSTOP DIAGNOSIS: Left femoral head avascular necrosis  PROCEDURE: Left total hip arthroplasty  SURGEON: Carlton Jones   ASSISTANT: Prashant Parrish.  The presence of a PA was necessary for positioning, retraction, and safe progression of the case  ANESTHESIA: Spinal with sedation   EBL: 200cc  COMPLICATIONS: None apparent   DISPO: Stable to PACU     IMPLANTS: DePuy Corail size 11 standard offset collared femur, 36mm x +8.5mm ceramic femoral head, 36mm x 52mm neutral polyethylene liner, and 52mm Levasy Cup    INDICATIONS: Abhijeet is a 59 year old male with a history of progressive left hip pain due to avascular necrosis.  After failing nonoperative management, he elected to proceed with a left OSCAR, understanding the risks of infection, damage to vessels or nerves, blood clots, instability, leg length discrepancy, ongoing pain and need for revision surgery.     PROCEDURE: Patient was met in the preoperative holding area where consent was reviewed and the left hip was marked.  He was then transferred to the operating theater. After a spinal anesthetic was placed, he was placed in a right lateral decubitus position with his left side up. All bony prominences were padded, he was secured with a Stulberg hip positioner, and an axillary roll was placed.  A timeout was performed verifying the correct patient, surgery, and location. He received preoperative antibiotics as well as transexamic acid. The left lower extremity was then prepped and draped in a standard fashion.     We then made an incision in line with a posterior approach to the hip. Dissection was sharply carried down through skin and subcutaneous tissue, and the gluteus fascia incised in line with the incision. After palpating and protecting the sciatic nerve, an east west retractor was placed. We then released the piriformis and short external rotators and then performed a T Capsulotomy. The  hip was dislocated and a neck cut made, approximately 4 mm proximal to the lesser trochanter. The femoral head measured 48mm and showed chondral delamination consistent with avascular necrosis.    We turned our attention to the acetabulum. After placing anterior and posterior retractors, foveal and labral tissue were removed.  We started with 48mm reamer and sequentially reamed to a 52, in approximately 45 deg of abduction and 25 deg of anteversion. At that point, we had good bleeding bone circumferentially.  A trial cup was placed with good pressfit followed by our final 52mm cup, again with good press fit, and a neutral liner. We then turned our attention to the femur. After using a cookie cutter and canal finder, we broached to a size 11, keeping the stem in approximatley 10-15 deg of anteversion. At that point, we had excellent rotational stability.  We then placed a standard offset neck with and a variety of different length femoral heads.  We felt the +8.5mm head most appropriate which showed leg lengths felt appropriate, the hip was stable in full extension and maximal external rotation, in the sleeping position, and with flexion to 90 degrees and internal rotation to 80 degrees.     We then removed all trial components and thoroughly irrigated the wound. We placed our final size 11 stem.  We then retrialed with a +8.5mm head.  Findings as above with flexion to 90 and IR to 80.  We impacted our final +8.5mm head. The wound was instilled with a dilute betadine solution. Capsule and short external rotators were repaired with 0-ethibond, gluteus fascia with #1 stratafix, subdermal sutures with 2-0 vicryl, and a running 3-0 subcuticular monocryl. A sterile dressing followed by an abductor pillow was placed and the patient was transferred to the PACU in stable condition.       POSTOPERATIVE PLAN:   1. WBAT left LE with posterior hip precautions   2. PT for mobilization   3. DVT prophylaxis: ASA 81mg daily x  42days  4. Perioperative antibiotics     Carlton Jones MD

## 2020-05-29 NOTE — PROGRESS NOTES
Ortho    HPI: 59M with a history of EtOH use and liver disease scheduled for surgery  Surgery has been cancelled at rescheduled mutliple times  Originally canceled due to UTI which was treated and has resolved  Canceled earlier this week due to low hgb (7.8)  Occult stool found in blood, recommend to have upper GI as outpatient  Has received parenteral iron and hgb up to 8.6 now  Have discussed case with his PCP, Dr. Valenzuela, and his hematologist who have cleared him  See original H&P from 4.27.20 which was addended on 5.10.20    Patient has been typed and crossed, although has many antibodies so blood may not be immediately available    Significantly limited by hip, having hard time walking    EXAM:  Pleasant, NAD  Lungs clear  Heart regular in rate and rhythm  Abd soft  Slightly jaundiced  Left hip painful with motion    IMPRESSION:   1. End stage left hip arthritis   2. Comorbidities including EtOH abuse with liver disease    PLAN:   1. High risk, although optimized with no modifiable risk factors -- has been typed and crossed and blood should be available if needed.  Do not anticipate blood loss <300cc.   2. Cleared for surgery    Carlton Jones MD

## 2020-05-29 NOTE — ANESTHESIA POSTPROCEDURE EVALUATION
Patient: Abhijeet Mccauley    Procedure(s):  ARTHROPLASTY, HIP, TOTAL    Diagnosis:Arthritis [M19.90]  Diagnosis Additional Information: No value filed.    Anesthesia Type:  Spinal    Note:  Anesthesia Post Evaluation    Patient location during evaluation: Bedside  Patient participation: Able to participate in evaluation but full recovery from regional anesthesia has not yet ocurrred but is anticipated to occur within 48 hours  Level of consciousness: awake and alert  Pain management: adequate  multimodal analgesia used between 6 hours prior to anesthesia start to PACU dischargeAirway patency: patent  Cardiovascular status: acceptable  Respiratory status: acceptable  two or more mitigation strategies used for obstructive sleep apneaHydration status: acceptable  PONV: none     Anesthetic complications: None          Last vitals:  Vitals:    05/29/20 1445 05/29/20 1459 05/29/20 1500   BP: 122/63  (!) 146/74   Pulse: 62  59   Resp: 17 20 17   Temp: 36.6  C (97.8  F)     SpO2: 100% 99% 98%         Electronically Signed By: LOPEZ Montes CRNA  May 29, 2020  3:14 PM

## 2020-05-29 NOTE — ANESTHESIA PROCEDURE NOTES
Procedure note : intrathecal  Staff -       CRNA: Jay Vega APRN CRNA    Performed By: CRNA        Pre-Procedure    Location: OR      Pre-Anesthestic Checklist: patient identified, IV checked, site marked, risks and benefits discussed, informed consent, monitors and equipment checked, pre-op evaluation, at physician/surgeon's request and post-op pain management    Timeout  Correct Patient: Yes   Correct Procedure: Yes   Correct Site: Yes   Correct Laterality: Yes   Correct Position: Yes   Site Marked: Yes   .   Procedure Documentation  ASA 3  .    Procedure: intrathecal, .   Patient Position:sitting Insertion Site:L3-4  (midline approach)     Patient Prep/Sterile Barriers; mask, sterile gloves, povidone-iodine 7.5% surgical scrub, patient draped.  .  Needle:  Spinal Needle (gauge): 22  Spinal/LP Needle Length (inches): 3.5 # of attempts: 1 and  # of redirects:  1 .        Assessment/Narrative  .  .  clear CSF fluid removed .

## 2020-05-29 NOTE — PLAN OF CARE
"[facility] [facility] [place]MISSION NOTE    Patient admitted to room 2300 at approximately 1600 via cart from surgery. Patient was accompanied by nurse.     Verbal SBAR report received from Yessenia prior to patient arrival.     Patient trasferred to bed via air sae. Patient alert and oriented X 3. The patient is not having any pain.  Admission vital signs: Blood pressure 120/53, pulse 60, temperature 97.6  F (36.4  C), temperature source Oral, resp. rate 16, height 1.702 m (5' 7\"), weight 85.7 kg (189 lb), SpO2 97 %. Patient was oriented to plan of care, call light, bed controls, tv, telephone, bathroom, and visiting hours.     Risk Assessment    The following safety risks were identified during admission: fall. Yellow risk band applied: YES.     Skin Initial Assessment    This writer admitted this patient and completed a full skin assessment and Kartik score in the Adult PCS flowsheet. Appropriate interventions initiated as needed.     Secondary skin check completed by Alexia Verdugo Risk Assessment  Sensory Perception: 3-->slightly limited  Moisture: 4-->rarely moist  Activity: 1--bedfast  Mobility: 3-->slightly limited  Nutrition: 3->adequate  Friction and Shear: 3-->  Kartik Score: 18  Bed Support Surface: Atmos Air mattress  Reassessed using Bed Algorithm: No    Education    Patient has a [place] to Observation order: No  Observation education completed and documented: N/A      Nicky Ragland RN      "

## 2020-05-29 NOTE — ANESTHESIA CARE TRANSFER NOTE
Patient: Abhijeet Mccauley    Procedure(s):  ARTHROPLASTY, HIP, TOTAL    Diagnosis: Arthritis [M19.90]  Diagnosis Additional Information: No value filed.    Anesthesia Type:   Spinal     Note:  Airway :Face Mask  Patient transferred to:PACU  Handoff Report: Identifed the Patient, Identified the Reponsible Provider, Reviewed the pertinent medical history, Discussed the surgical course, Reviewed Intra-OP anesthesia mangement and issues during anesthesia, Set expectations for post-procedure period and Allowed opportunity for questions and acknowledgement of understanding      Vitals: (Last set prior to Anesthesia Care Transfer)    CRNA VITALS  5/29/2020 1348 - 5/29/2020 1426      5/29/2020             Resp Rate (observed):  10                Electronically Signed By: LOPEZ Montes CRNA  May 29, 2020  2:26 PM

## 2020-05-29 NOTE — PROGRESS NOTES
"Writer completed baseline admit skin assessment. \"Bruise\" top of right foot and around left upper thigh. Left hip incision covered with surgical dressing.  "

## 2020-05-30 ENCOUNTER — APPOINTMENT (OUTPATIENT)
Dept: OCCUPATIONAL THERAPY | Facility: CLINIC | Age: 60
End: 2020-05-30
Attending: ORTHOPAEDIC SURGERY
Payer: COMMERCIAL

## 2020-05-30 ENCOUNTER — APPOINTMENT (OUTPATIENT)
Dept: PHYSICAL THERAPY | Facility: CLINIC | Age: 60
End: 2020-05-30
Attending: ORTHOPAEDIC SURGERY
Payer: COMMERCIAL

## 2020-05-30 VITALS
OXYGEN SATURATION: 95 % | HEIGHT: 67 IN | RESPIRATION RATE: 16 BRPM | DIASTOLIC BLOOD PRESSURE: 50 MMHG | SYSTOLIC BLOOD PRESSURE: 120 MMHG | BODY MASS INDEX: 29.66 KG/M2 | WEIGHT: 189 LBS | HEART RATE: 67 BPM | TEMPERATURE: 98.5 F

## 2020-05-30 LAB
ANION GAP SERPL CALCULATED.3IONS-SCNC: 8 MMOL/L (ref 3–14)
BUN SERPL-MCNC: 27 MG/DL (ref 7–30)
CALCIUM SERPL-MCNC: 8.4 MG/DL (ref 8.5–10.1)
CHLORIDE SERPL-SCNC: 107 MMOL/L (ref 94–109)
CO2 SERPL-SCNC: 24 MMOL/L (ref 20–32)
CREAT SERPL-MCNC: 2.02 MG/DL (ref 0.66–1.25)
GFR SERPL CREATININE-BSD FRML MDRD: 34 ML/MIN/{1.73_M2}
GLUCOSE SERPL-MCNC: 176 MG/DL (ref 70–99)
HGB BLD-MCNC: 7.6 G/DL (ref 13.3–17.7)
POTASSIUM SERPL-SCNC: 3.8 MMOL/L (ref 3.4–5.3)
SODIUM SERPL-SCNC: 139 MMOL/L (ref 133–144)

## 2020-05-30 PROCEDURE — 40000894 ZZH STATISTIC OT IP EVAL DEFER

## 2020-05-30 PROCEDURE — 80048 BASIC METABOLIC PNL TOTAL CA: CPT | Performed by: ORTHOPAEDIC SURGERY

## 2020-05-30 PROCEDURE — 25000132 ZZH RX MED GY IP 250 OP 250 PS 637: Performed by: ORTHOPAEDIC SURGERY

## 2020-05-30 PROCEDURE — 85018 HEMOGLOBIN: CPT | Performed by: ORTHOPAEDIC SURGERY

## 2020-05-30 PROCEDURE — 25000128 H RX IP 250 OP 636: Performed by: ORTHOPAEDIC SURGERY

## 2020-05-30 PROCEDURE — 36415 COLL VENOUS BLD VENIPUNCTURE: CPT | Performed by: ORTHOPAEDIC SURGERY

## 2020-05-30 PROCEDURE — 25000132 ZZH RX MED GY IP 250 OP 250 PS 637: Performed by: PHYSICIAN ASSISTANT

## 2020-05-30 PROCEDURE — 97165 OT EVAL LOW COMPLEX 30 MIN: CPT | Mod: GO

## 2020-05-30 PROCEDURE — 97535 SELF CARE MNGMENT TRAINING: CPT | Mod: GO

## 2020-05-30 PROCEDURE — 97530 THERAPEUTIC ACTIVITIES: CPT | Mod: GP

## 2020-05-30 PROCEDURE — 97161 PT EVAL LOW COMPLEX 20 MIN: CPT | Mod: GP

## 2020-05-30 PROCEDURE — 97110 THERAPEUTIC EXERCISES: CPT | Mod: GP

## 2020-05-30 RX ORDER — OXYCODONE HYDROCHLORIDE 5 MG/1
5-10 TABLET ORAL
Qty: 50 TABLET | Refills: 0 | Status: ON HOLD | OUTPATIENT
Start: 2020-05-30 | End: 2020-06-25

## 2020-05-30 RX ORDER — HYDROXYZINE HYDROCHLORIDE 25 MG/1
25-50 TABLET, FILM COATED ORAL EVERY 6 HOURS PRN
Qty: 50 TABLET | Refills: 0 | Status: ON HOLD | OUTPATIENT
Start: 2020-05-30 | End: 2020-06-25

## 2020-05-30 RX ORDER — AMOXICILLIN 250 MG
1-2 CAPSULE ORAL DAILY PRN
Qty: 10 TABLET | Refills: 0 | Status: ON HOLD | OUTPATIENT
Start: 2020-05-30 | End: 2020-07-18

## 2020-05-30 RX ADMIN — MULTIPLE VITAMINS W/ MINERALS TAB 1 TABLET: TAB at 08:46

## 2020-05-30 RX ADMIN — OXYCODONE HYDROCHLORIDE 10 MG: 5 TABLET ORAL at 08:44

## 2020-05-30 RX ADMIN — ACETAMINOPHEN 975 MG: 325 TABLET, FILM COATED ORAL at 00:35

## 2020-05-30 RX ADMIN — BACLOFEN 10 MG: 10 TABLET ORAL at 14:18

## 2020-05-30 RX ADMIN — FUROSEMIDE 20 MG: 20 TABLET ORAL at 08:45

## 2020-05-30 RX ADMIN — GABAPENTIN 300 MG: 300 CAPSULE ORAL at 14:18

## 2020-05-30 RX ADMIN — THIAMINE HCL TAB 100 MG 100 MG: 100 TAB at 08:46

## 2020-05-30 RX ADMIN — PROPRANOLOL HYDROCHLORIDE 20 MG: 20 TABLET ORAL at 08:46

## 2020-05-30 RX ADMIN — ACETAMINOPHEN 975 MG: 325 TABLET, FILM COATED ORAL at 16:47

## 2020-05-30 RX ADMIN — ACETAMINOPHEN 975 MG: 325 TABLET, FILM COATED ORAL at 08:44

## 2020-05-30 RX ADMIN — BACLOFEN 10 MG: 10 TABLET ORAL at 08:46

## 2020-05-30 RX ADMIN — GABAPENTIN 300 MG: 300 CAPSULE ORAL at 08:46

## 2020-05-30 RX ADMIN — OXYCODONE HYDROCHLORIDE 5 MG: 5 TABLET ORAL at 14:18

## 2020-05-30 RX ADMIN — CEFAZOLIN SODIUM 2 G: 2 INJECTION, SOLUTION INTRAVENOUS at 04:26

## 2020-05-30 RX ADMIN — TRAZODONE HYDROCHLORIDE 50 MG: 50 TABLET ORAL at 00:36

## 2020-05-30 RX ADMIN — OXYCODONE HYDROCHLORIDE 10 MG: 5 TABLET ORAL at 00:36

## 2020-05-30 RX ADMIN — ATORVASTATIN CALCIUM 20 MG: 20 TABLET, FILM COATED ORAL at 08:45

## 2020-05-30 RX ADMIN — BUPROPION HYDROCHLORIDE 150 MG: 150 TABLET, FILM COATED, EXTENDED RELEASE ORAL at 08:48

## 2020-05-30 RX ADMIN — PANTOPRAZOLE SODIUM 40 MG: 20 TABLET, DELAYED RELEASE ORAL at 08:45

## 2020-05-30 RX ADMIN — OXYCODONE HYDROCHLORIDE 10 MG: 5 TABLET ORAL at 04:26

## 2020-05-30 RX ADMIN — SPIRONOLACTONE 50 MG: 25 TABLET ORAL at 08:45

## 2020-05-30 RX ADMIN — FOLIC ACID 2 MG: 1 TABLET ORAL at 08:46

## 2020-05-30 RX ADMIN — ASPIRIN 81 MG: 81 TABLET, COATED ORAL at 08:46

## 2020-05-30 NOTE — PLAN OF CARE
Pt alert/oriented. CIWA=0. Pt taking oxycodone for pain & states he is comfortable at rest. Up with minimal assist of 2 & walker. Denied dizziness. Tolerated standing & attempted to use urinal with no results. Sat on BSC & no void. Had smear of soft brown stool. Bladder scanned for 374mls. Urinal at bedside. Pt on RA, sats 97%. Tolerating reg diet, no nausea. IVF infusing. Pt using IS & pulls to 1500.

## 2020-05-30 NOTE — PLAN OF CARE
Discharge Planner PT   Patient plan for discharge: Home with assist as needed from sister and  PT/OT.  Current status: PT evaluation complete,treatment initiated. Patient SBA- IND for bed mobility within hip precautions. Patient Sonia for sit<>stand with 4WW. Patient ambulated ~100' with 4WW and SBA-Sonia. Patient negotiated 2x2 stairs with B railings to simulate home environment. Patient educated on L hip precautions, activity recommendations and PT POC.    Patient overall moving well, meeting all IP PT goals, PT will follow daily while admitted. PT recommend discharge to home with assist as needed from sister and HHPT/ OT for continued gait,strength, balance and functional mobility training. PT recommending patient remain admit 1 more day.    *NST/RN staff to please assist patient in ambulating in [place] with 4WW and GB regularly throughout the day.     Barriers to return to prior living situation: Medical, decreased activity tolerance, L hip precautions.  Recommendations for discharge: Home with assist PRN and HHPT/OT  Rationale for recommendations: Please see above.       Entered by: Miranda Muhammad 05/30/2020 9:42 AM

## 2020-05-30 NOTE — PLAN OF CARE
[facility] DISCHARGE NOTE    Patient discharged to home at 5:02 PM via wheel chair. Accompanied by other:Friend and staff. Discharge instructions reviewed with patient, opportunity offered to ask questions. Prescriptions sent to patients preferred pharmacy. All belongings sent with patient.    Karlene Garcia RN

## 2020-05-30 NOTE — PLAN OF CARE
"Pt bladder scanned for 456mls. States he does not feel the need to urinate. Up with minimal assist of 1 & walker to BSC. Unable to void. Pt states he will refuse to be straight cathed & wants to \"go on his own.\" states \"this is normal for me & I will let you know when I need to go\". Pt adamant he will not be straight cathed. Moves well with walker, denies dizziness. CIWA=0. Rating pain 9/10, appears comfortable & moves well without grimacing. Oxycodone 10mg given for pain.   "

## 2020-05-30 NOTE — PROGRESS NOTES
Ortho    No acute events overnight.  Straight cathed overnight, now feeling better  Moving well with therapy  Pain controlled    AFVSS  Pleasant, NAD  Nonlabored breathing  Left LE: Dressing cdi.  Fires ehl/fhl/gsc/ta c SILT dp/sp/tib n.  Toes warm    Hgb 7.6 (7.6 to 8.6 last week preop)  Cr: 2.0    IMPRESSION:   1.  s/p left OSCAR 5.30.20   2. Comorbidities including history of EtOH abuse, liver disease    PLAN:   --WBAT left leg.  PT for mobilization   --DVT prophylaxis: ASA 81mg daily x 42d   --Cont oxycodone, vistaril, tylenol for pain control    --Dispo: Home later today pending pain control, progress with PT    Carlton Jones MD

## 2020-05-30 NOTE — PROGRESS NOTES
Pt's VSS, CMS intact, pain well managed on oral medications.  He is tolerating diet, appetite good.  No void yet, bladder scan at 1150 for 269.  Pt will discharge when he's able to void, he's aware.

## 2020-05-30 NOTE — PROGRESS NOTES
05/30/20 0900   Quick Adds   Type of Visit Initial PT Evaluation   Living Environment   Lives With sibling(s)   Living Arrangements house   Home Accessibility stairs to enter home;stairs within home   Number of Stairs, Main Entrance 3   Stair Railings, Main Entrance railings safe and in good condition   Transportation Anticipated public transportation;health plan transportation   Living Environment Comment PT: Cleaning ladies come 2x/week.     Self-Care   Usual Activity Tolerance good   Current Activity Tolerance good   Equipment Currently Used at Home walker, rolling;commode;shower chair;grab bar, toilet   Functional Level Prior   Ambulation 1-->assistive equipment   Transferring 1-->assistive equipment   Toileting 0-->independent   Bathing 0-->independent   Communication 0-->understands/communicates without difficulty   Swallowing 0-->swallows foods/liquids without difficulty   Cognition 0 - no cognition issues reported   Fall history within last six months yes   Number of times patient has fallen within last six months 3   Which of the above functional risks had a recent onset or change? ambulation;transferring;dressing   Prior Functional Level Comment PT: Patient reporting uses 4WW for ambulation. Able to dress slowly and bathe slowly IND.   General Information   Onset of Illness/Injury or Date of Surgery - Date 05/29/20   Referring Physician Carlton Jones MD   Patient/Family Goals Statement To go home after d/c   Pertinent History of Current Problem (include personal factors and/or comorbidities that impact the POC) POD #1 L OSCAR on 5/29, complex PMH including anemia, AML, liver disease.   Precautions/Limitations left hip precautions;fall precautions   Weight-Bearing Status - LLE weight-bearing as tolerated   General Observations PIV in L hand, incision on L hip   General Info Comments Activity: Up with assist and ambulate with assist.   Cognitive Status Examination   Orientation orientation to  person, place and time   Level of Consciousness alert   Follows Commands and Answers Questions 75% of the time;able to follow multistep instructions   Personal Safety and Judgment intact   Memory intact   Cognitive Comment PT: Occassionally tangential with speech.   Pain Assessment   Patient Currently in Pain Yes, see Vital Sign flowsheet  (8/10 in L hip)   Integumentary/Edema   Integumentary/Edema Comments PT: Normal post-op swelling present in LLE.   Posture    Posture Comments PT: Forward head and rounded shoulders.   Range of Motion (ROM)   ROM Comment PT: Not formally tested on LLE d/t L hip precautions. All other ROM WNL for observed mobility.   Strength   Strength Comments PT: Not formally tested, at least 3+/5 globally per observed mobility.   Bed Mobility   Bed Mobility Comments PT: Patient SBA, progress to IND with supine<>sit EOB within precuations.   Transfer Skills   Transfer Comments PT: Patient SBA, progress to Sonia with FWW for sit<>stand.   Gait   Gait Comments PT: Patient ambulated ~100', SBA-Sonia with 4WW. Negotiated 2x2 stairs with B railnigs and SBA.   Balance   Balance Comments PT: Sitting safe and stable, standing needing 4WW for safety.   Sensory Examination   Sensory Perception Comments PT: Some residual LLE numbness from surgery.   Modality Interventions   Planned Modality Interventions Cryotherapy   Planned Modality Interventions Comments PT: Ice PRN for pain.   General Therapy Interventions   Planned Therapy Interventions balance training;bed mobility training;gait training;strengthening;transfer training;risk factor education;home program guidelines;progressive activity/exercise   Clinical Impression   Criteria for Skilled Therapeutic Intervention yes, treatment indicated   PT Diagnosis Impaired functional mobility s/p L OSCAR   Influenced by the following impairments Pain, decreased strength, LLE numbness and decreased activity tolerance   Functional limitations due to impairments Bed  "mobility, transfers, gait/stairs, ADLs/iADLs   Clinical Presentation Stable/Uncomplicated   Clinical Presentation Rationale POD #1 and doing well, history of falls, clinical judgement   Clinical Decision Making (Complexity) Low complexity   Therapy Frequency Daily   Predicted Duration of Therapy Intervention (days/wks) 3 days   Anticipated Discharge Disposition Home with Assist;Home with Home Therapy   Risk & Benefits of therapy have been explained Yes   Patient, Family & other staff in agreement with plan of care Yes   Clinical Impression Comments PT evaluation complete, treatment initated.   [facility] TM \"6 Clicks\"   2016, Trustees of [facility], under license to [employer].  All rights reserved.   6 Clicks Short Forms Basic Mobility Inpatient Short Form   [facility] AM-PAC  \"6 Clicks\" V.2 Basic Mobility Inpatient Short Form   1. Turning from your back to your side while in a flat bed without using bedrails? 4 - None   2. Moving from lying on your back to sitting on the side of a flat bed without using bedrails? 3 - A Little   3. Moving to and from a bed to a chair (including a wheelchair)? 3 - A Little   4. Standing up from a chair using your arms (e.g., wheelchair, or bedside chair)? 3 - A Little   5. To walk in hospital room? 3 - A Little   6. Climbing 3-5 steps with a railing? 3 - A Little   Basic Mobility Raw Score (Score out of 24.Lower scores equate to lower levels of function) 19   Total Evaluation Time   Total Evaluation Time (Minutes) 4     Miranda Muhammad PT, DTP  [employer]  [email or web address]  Pager: [phone]    "

## 2020-05-30 NOTE — PROGRESS NOTES
"   05/30/20 1100   Quick Adds   Quick Adds Certification   Type of Visit Initial Occupational Therapy Evaluation   Living Environment   Lives With sibling(s)   Living Arrangements   (Lives in finished garge with no running water)   Living Environment Comment Pt lives in finished garage at sisters. No running water but has a commode. Showers in the house, walk-in with seat and bars.  States he uses amicrowave or hotplate, grill. Previously ADL  I. Uses a cane in the house and a 4WW in his apt and outside.   General Information   Onset of Illness/Injury or Date of Surgery - Date 05/29/20   Referring Physician Robert   Patient/Family Goals Statement Return home   Additional Occupational Profile Info/Pertinent History of Current Problem 60 yo M s/p L OSCAR d/t end stage hip arthritis. PMH: ETOH, liver disease   Precautions/Limitations left hip precautions   Cognitive Status Examination   Orientation orientation to person, place and time   Level of Consciousness alert   Cognitive Comment He reports he has a hard time remembering things   Pain Assessment   Patient Currently in Pain   (\"tingles\" meds controlling pain)   Mobility   Bed Mobility Bed mobility skill: Supine to sit   Bed Mobility Skill: Supine to Sit   Level of Dewitt: Supine/Sit stand-by assist   Transfer Skill: Bed to Chair/Chair to Bed   Level of Dewitt: Bed to Chair stand-by assist   Assistive Device - Transfer Skill Bed to Chair Chair to Bed Rehab Eval rolling walker   Transfer Skill: Sit to Stand   Level of Dewitt: Sit/Stand stand-by assist   Transfer Skill: Toilet Transfer   Level of Dewitt: Toilet stand-by assist   Lower Body Dressing   Level of Dewitt: Dress Lower Body stand-by assist   Toileting   Level of Dewitt: Toilet stand-by assist   Grooming   Level of Dewitt: Grooming stand-by assist  (standing at sink)   General Therapy Interventions   Planned Therapy Interventions ADL retraining   Clinical Impression " "  Criteria for Skilled Therapeutic Interventions Met yes, treatment indicated   OT Diagnosis impaired ADLs   Influenced by the following impairments L OSCAR   Assessment of Occupational Performance 1-3 Performance Deficits   Identified Performance Deficits dressing, toileting   Clinical Decision Making (Complexity) Low complexity   Therapy Frequency   (1 session)   Anticipated Discharge Disposition Home;Home with Assist;Home with Home Therapy   Risks and Benefits of Treatment have been explained. Yes   Patient, Family & other staff in agreement with plan of care Yes   Therapy Certification   Start of Care Date 05/30/20   Certification date from 05/30/20   Certification date to 05/31/20   Medical Diagnosis L OSCAR   Certification I certify the need for these services furnished under this plan of treatment and while under my care.  (Physician co-signature of this document indicates review and certification of the therapy plan).   [facility] AM-PAC  \"6 Clicks\" Daily Activity Inpatient Short Form   1. Putting on and taking off regular lower body clothing? 3 - A Little   2. Bathing (including washing, rinsing, drying)? 3 - A Little   3. Toileting, which includes using toilet, bedpan or urinal? 4 - None   4. Putting on and taking off regular upper body clothing? 4 - None   5. Taking care of personal grooming such as brushing teeth? 4 - None   6. Eating meals? 4 - None   Daily Activity Raw Score (Score out of 24.Lower scores equate to lower levels of function) 22   Total Evaluation Time   Total Evaluation Time (Minutes) 20     Lesvia Obrien OTR  "

## 2020-05-30 NOTE — PROGRESS NOTES
Pt able to void small amount.  Post void scan 318.  Will wait on discharge for now until pt able to void adequate amount.  He's still hoping to go home later this afternoon or evening.

## 2020-05-30 NOTE — PLAN OF CARE
OT SUMMARY D/C:  Patient plan for D/C:   Home with HH, home therapies, sister on site    Current Status:   I/S room ADLs and related mobility using 4WW. Stood at sink 4' for H/G. Safe toilet transfer. Able to demonstrate ability to use AE for LB dressing to remain in precautions. Pt has been given written literature re precautions, AE recommendations and activities following hip surgery    Barriers to return to prior living situation:   none    Recommendation for D/C:  See above    Rationale for D/C recommendations:  See above    MADDIE Dewitt        Occupational Therapy Discharge Summary    Reason for therapy discharge:    All goals and outcomes met, no further needs identified.    Progress towards therapy goal(s). See goals on Care Plan in [facility] electronic health record for goal details.  Goals met    Therapy recommendation(s):    Continued therapy is recommended.  Rationale/Recommendations:  To increase safety and I in his environment, use of AE and following precautions.    Lesvia Obrien OTR

## 2020-05-30 NOTE — PLAN OF CARE
"Pt bladder scanned for 710mls. Up with SBA & walker to the bathroom & was unable to void. Pt agreed to let this writer straight cath. Straight cathed for 800cc's clear susan urine. Pt states he is \"much more comfortable now\".   "

## 2020-05-30 NOTE — PROGRESS NOTES
[facility]        OUTPATIENT PHYSICAL THERAPY FUNCTIONAL EVALUATION  PLAN OF TREATMENT FOR OUTPATIENT REHABILITATION  (COMPLETE FOR INITIAL CLAIMS ONLY)  Patient's Last Name, First Name, M.I.  YOB: 1960  Abhijeet Mccauley     Provider's Name   [facility]   Medical Record No.  [id number]     Start of Care Date:   5/30/2020   Onset Date:   5/30/2020   Type:     _X__PT   ____OT  ____SLP Medical Diagnosis:   s/p L OSCAR     PT Diagnosis:   Impaired functional mobility Visits from SOC:  1                              __________________________________________________________________________________  Plan of Treatment/Functional Goals:              GOALS         1. Patient will be IND with transferring supine<>sit EOB within precautions in order to be able to safely transfer OOB at home.    2. Patient will transfer sit<>stand with 4WW, modII and within L hip precautions in order to maintain safety with transfer at home.    3. Patient will ambulate greater than 200' with 4WW, Sonia in order to ambulate in the community without difficulty.    4. Patient will negotiate 3 stairs with B railings in order to safely get into his home.    5. Patient will verbally recall 3/3 L hip precautions in order to maintain safety with mobility within the community.          Therapy Frequency:    Daily d/t OP status  Predicted Duration of Therapy Intervention:   4 days    No name on file.                                    I CERTIFY THE NEED FOR THESE SERVICES FURNISHED UNDER        THIS PLAN OF TREATMENT AND WHILE UNDER MY CARE     (Physician co-signature of this document indicates review and certification of the therapy plan).                Certification Date From:   5/30/2020   Certification Date To:   6/10/2020    Referring Provider:   REJI MENDEZ    Initial Assessment  See Epic  Evaluation-

## 2020-05-30 NOTE — CONSULTS
CARE TRANSITION SOCIAL WORK INITIAL ASSESSMENT:    Admit date/time:  OUTPT       Met with: Patient.      DATA  Principal Problem:    Status post total hip replacement, left  Active Problems:    Essential hypertension    Acute myeloid leukemia in remission (H)    Alcoholic cirrhosis of liver (H)    Thrombocytopenia (H)    CKD (chronic kidney disease) stage 3, GFR 30-59 ml/min (H)    Mild intermittent asthma without complication    Alcohol dependence (H)    Tobacco dependence syndrome    Normocytic anemia    Chronic midline low back pain with bilateral sciatica    Iron deficiency anemia due to chronic blood loss        Contact information and PCP information verified: Yes, Dr. Michelle Valenzuela at [facility] clinic    ASSESSMENT  Cognitive Status: awake, alert and oriented.       SW placed call to pt's room.  Introduced self, title & role.  SW very familiar with pt from role as clinic care coordinator-.  Pt states he remembers this writer.  Pt shared that he hopes to discharge to home later today.    Pt continues to live in his sister's garage, using the house bathroom as needed.  [facility], [phone].  Pt receives:  Meals on Wheels, Homemaking services, and has had in-home modifications under this waiver.     PT states that pt is able to discharge to home with Home Care services of RN, PT/OT.  SW and pt   discussed discharge planning and medicare guidelines in regards to home care and SNF benefits. Pt/family was provided with the Medicare Compare list for Home Care, discussed associated star ratings to assist with choice for referrals/discharge planning Yes    Education was given to pt/family that star ratings are updated/maintained by Medicare and can be reviewed by visiting www.medicare.gov Yes    Pt said he has utilized [facility] ([phone] Fax: [phone]) in the past & would like to use them again.  Sw sent referral to [facility].        Insurance  Concerns: No Insurance issues identified  Living Arrangements: house/garage    PLAN:  Pt to discharge to home with [facility] ([phone] Fax: [phone]).  Pt states his friend will transport him home upon discharge.    HOME CARE HAND OFF  Patient Name: Abhijeet Mccauley    MRN: [id number]    : 1960    Patient Zip Code: [address]    Admit Diagnosis: Arthritis [M19.90]      Services Pt Needs at Home: RN, PT and OT    Discharge Support: Family/Friend Support    Living Arrangements: Alone, but sister is in the home.  Pt lives in the converted garage.     or Address Other Than Pt: No    Wound Care: No    Anticipate DC Date: 2020    RUDY Morrow  [facility] [phone]   [facility]  [phone]

## 2020-06-02 ENCOUNTER — PATIENT OUTREACH (OUTPATIENT)
Dept: ONCOLOGY | Facility: CLINIC | Age: 60
End: 2020-06-02

## 2020-06-02 NOTE — PROGRESS NOTES
Called and left message for patient to call back. Dr. Rowe is referring patient to gastroenterology due to blood in stool. Referral placed. Waiting for patient to call back to discuss further. Juana Comer RN on 6/2/2020 at 11:45 AM

## 2020-06-03 ENCOUNTER — TELEPHONE (OUTPATIENT)
Dept: GASTROENTEROLOGY | Facility: CLINIC | Age: 60
End: 2020-06-03

## 2020-06-03 NOTE — TELEPHONE ENCOUNTER
LPN spoke with patient to schedule a follow up appointment. Patient stated he is unable to do a video visit as he does not have a smart phone or a computer with a camera. Patient scheduled a telephone visit for 6/23 with Dr. Loco.     Julia Gorman LPN

## 2020-06-03 NOTE — TELEPHONE ENCOUNTER
Call patient to setup a VV with Dr. Loco.    Chelsea Ortiz, RN  Gastroenterology Care Coordinator  [place], [place]

## 2020-06-04 ENCOUNTER — DOCUMENTATION ONLY (OUTPATIENT)
Dept: FAMILY MEDICINE | Facility: CLINIC | Age: 60
End: 2020-06-04

## 2020-06-04 ENCOUNTER — TELEPHONE (OUTPATIENT)
Dept: FAMILY MEDICINE | Facility: CLINIC | Age: 60
End: 2020-06-04

## 2020-06-04 NOTE — TELEPHONE ENCOUNTER
Patient was upset and VERY hard to understand. He was asking about a letter for housing, then asked for his surgeon. Patient was confused and then hung up the phone. Will address issue if patient calls back again. Will close encounter at this time.  Kellie Putnam RN

## 2020-06-04 NOTE — TELEPHONE ENCOUNTER
Patient called and was confused  by a letter he received from us.  Not sure what he's asking and he was rude and hung up.    Lia Mitchell, [facility]

## 2020-06-04 NOTE — PROGRESS NOTES
Went out to do the PT eval and during it client reports he had a fall in his yard on Monday  afternoon 6/1 and he scraped his R elbow , he was rolling away from his new OSCAR and reports no injury in the hip. He was not using a device and tripped in a small hole, Educated client that he needs to use his walker at all times.  He reports no injury to his hip , the elbow is scabbed over and no sign of infection and RN observed also . No need for medical attention .     Client will be continuing education on fall prevention with PT sessions.  Jojo Yates PT

## 2020-06-08 ENCOUNTER — TELEPHONE (OUTPATIENT)
Dept: FAMILY MEDICINE | Facility: CLINIC | Age: 60
End: 2020-06-08

## 2020-06-08 ENCOUNTER — TELEPHONE (OUTPATIENT)
Dept: RESPIRATORY THERAPY | Facility: CLINIC | Age: 60
End: 2020-06-08

## 2020-06-08 NOTE — TELEPHONE ENCOUNTER
FYI:  Pt had a fall on Saturday. States he was walking in yard with walker when he stepped in a hold. Stating he landed on R side. Was able to get self up unassisted. No EMS or ED visits

## 2020-06-09 ENCOUNTER — PATIENT OUTREACH (OUTPATIENT)
Dept: ONCOLOGY | Facility: CLINIC | Age: 60
End: 2020-06-09

## 2020-06-09 NOTE — PROGRESS NOTES
Called and spoke with patient over the phone today and let him know he had some blood in his stool and Dr. Rowe is recommending he see a gastroenterologist. Referral placed and sent message to see if schedulers will help him get this scheduled. Patient verbalized understanding and agreement to plan. Juana Comer RN on 6/9/2020 at 11:06 AM

## 2020-06-12 ENCOUNTER — ANESTHESIA (OUTPATIENT)
Dept: EMERGENCY MEDICINE | Facility: CLINIC | Age: 60
End: 2020-06-12
Payer: COMMERCIAL

## 2020-06-12 ENCOUNTER — APPOINTMENT (OUTPATIENT)
Dept: CT IMAGING | Facility: CLINIC | Age: 60
End: 2020-06-12
Attending: EMERGENCY MEDICINE
Payer: COMMERCIAL

## 2020-06-12 ENCOUNTER — APPOINTMENT (OUTPATIENT)
Dept: GENERAL RADIOLOGY | Facility: CLINIC | Age: 60
End: 2020-06-12
Attending: EMERGENCY MEDICINE
Payer: COMMERCIAL

## 2020-06-12 ENCOUNTER — ANESTHESIA EVENT (OUTPATIENT)
Dept: EMERGENCY MEDICINE | Facility: CLINIC | Age: 60
End: 2020-06-12
Payer: COMMERCIAL

## 2020-06-12 ENCOUNTER — HOSPITAL ENCOUNTER (INPATIENT)
Facility: CLINIC | Age: 60
LOS: 13 days | Discharge: NURSING, REHAB OR LONG-TERM CARE | End: 2020-06-25
Attending: INTERNAL MEDICINE | Admitting: SURGERY
Payer: COMMERCIAL

## 2020-06-12 ENCOUNTER — HOSPITAL ENCOUNTER (EMERGENCY)
Facility: CLINIC | Age: 60
Discharge: ANOTHER HOSPITAL | End: 2020-06-12
Attending: EMERGENCY MEDICINE | Admitting: EMERGENCY MEDICINE
Payer: COMMERCIAL

## 2020-06-12 VITALS
TEMPERATURE: 99.3 F | SYSTOLIC BLOOD PRESSURE: 99 MMHG | OXYGEN SATURATION: 98 % | HEIGHT: 66 IN | DIASTOLIC BLOOD PRESSURE: 52 MMHG | WEIGHT: 185 LBS | HEART RATE: 87 BPM | BODY MASS INDEX: 29.73 KG/M2 | RESPIRATION RATE: 16 BRPM

## 2020-06-12 DIAGNOSIS — R56.9 SEIZURES (H): ICD-10-CM

## 2020-06-12 DIAGNOSIS — S22.41XA CLOSED FRACTURE OF MULTIPLE RIBS OF RIGHT SIDE, INITIAL ENCOUNTER: ICD-10-CM

## 2020-06-12 DIAGNOSIS — R65.21 SEPSIS WITH ACUTE RENAL FAILURE AND SEPTIC SHOCK, DUE TO UNSPECIFIED ORGANISM, UNSPECIFIED ACUTE RENAL FAILURE TYPE (H): ICD-10-CM

## 2020-06-12 DIAGNOSIS — K70.31 ALCOHOLIC CIRRHOSIS OF LIVER WITH ASCITES (H): ICD-10-CM

## 2020-06-12 DIAGNOSIS — F10.939 ALCOHOL WITHDRAWAL SYNDROME WITH COMPLICATION (H): ICD-10-CM

## 2020-06-12 DIAGNOSIS — Z96.642 STATUS POST TOTAL HIP REPLACEMENT, LEFT: ICD-10-CM

## 2020-06-12 DIAGNOSIS — N39.0 URINARY TRACT INFECTION WITHOUT HEMATURIA, SITE UNSPECIFIED: ICD-10-CM

## 2020-06-12 DIAGNOSIS — N17.9 SEPSIS WITH ACUTE RENAL FAILURE AND SEPTIC SHOCK, DUE TO UNSPECIFIED ORGANISM, UNSPECIFIED ACUTE RENAL FAILURE TYPE (H): ICD-10-CM

## 2020-06-12 DIAGNOSIS — Z53.9 DIAGNOSIS NOT YET DEFINED: Primary | ICD-10-CM

## 2020-06-12 DIAGNOSIS — N39.0 ACUTE URINARY TRACT INFECTION: ICD-10-CM

## 2020-06-12 DIAGNOSIS — A41.9 SEPSIS WITH ACUTE RENAL FAILURE AND SEPTIC SHOCK, DUE TO UNSPECIFIED ORGANISM, UNSPECIFIED ACUTE RENAL FAILURE TYPE (H): ICD-10-CM

## 2020-06-12 DIAGNOSIS — N17.9 SEPSIS WITH ACUTE RENAL FAILURE AND SEPTIC SHOCK, DUE TO UNSPECIFIED ORGANISM, UNSPECIFIED ACUTE RENAL FAILURE TYPE (H): Primary | ICD-10-CM

## 2020-06-12 DIAGNOSIS — F10.20 ALCOHOL USE DISORDER, SEVERE, DEPENDENCE (H): ICD-10-CM

## 2020-06-12 DIAGNOSIS — S72.002A HIP FRACTURE, LEFT, CLOSED, INITIAL ENCOUNTER (H): ICD-10-CM

## 2020-06-12 DIAGNOSIS — A41.9 SEPSIS WITH ACUTE RENAL FAILURE AND SEPTIC SHOCK, DUE TO UNSPECIFIED ORGANISM, UNSPECIFIED ACUTE RENAL FAILURE TYPE (H): Primary | ICD-10-CM

## 2020-06-12 DIAGNOSIS — R65.21 SEPSIS WITH ACUTE RENAL FAILURE AND SEPTIC SHOCK, DUE TO UNSPECIFIED ORGANISM, UNSPECIFIED ACUTE RENAL FAILURE TYPE (H): Primary | ICD-10-CM

## 2020-06-12 DIAGNOSIS — Z71.6 ENCOUNTER FOR TOBACCO USE CESSATION COUNSELING: ICD-10-CM

## 2020-06-12 DIAGNOSIS — K59.09 OTHER CONSTIPATION: ICD-10-CM
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
## 2020-06-12 DIAGNOSIS — R41.82 ALTERED MENTAL STATUS, UNSPECIFIED ALTERED MENTAL STATUS TYPE: ICD-10-CM

## 2020-06-12 DIAGNOSIS — G47.00 INSOMNIA, UNSPECIFIED TYPE: ICD-10-CM

## 2020-06-12 PROBLEM — S22.49XA RIB FRACTURES: Status: ACTIVE | Noted: 2020-06-12

## 2020-06-12 LAB
ALBUMIN SERPL-MCNC: 2.3 G/DL (ref 3.4–5)
ALBUMIN SERPL-MCNC: 2.9 G/DL (ref 3.4–5)
ALBUMIN UR-MCNC: 30 MG/DL
ALP SERPL-CCNC: 340 U/L (ref 40–150)
ALP SERPL-CCNC: 464 U/L (ref 40–150)
ALT SERPL W P-5'-P-CCNC: 16 U/L (ref 0–70)
ALT SERPL W P-5'-P-CCNC: 16 U/L (ref 0–70)
ALT SERPL W P-5'-P-CCNC: 19 U/L (ref 0–70)
AMMONIA PLAS-SCNC: 71 UMOL/L (ref 10–50)
AMMONIA PLAS-SCNC: NORMAL UMOL/L (ref 10–50)
AMMONIA PLAS-SCNC: NORMAL UMOL/L (ref 10–50)
AMPHETAMINES UR QL SCN: NEGATIVE
ANION GAP SERPL CALCULATED.3IONS-SCNC: 7 MMOL/L (ref 3–14)
ANION GAP SERPL CALCULATED.3IONS-SCNC: 9 MMOL/L (ref 3–14)
APPEARANCE UR: ABNORMAL
APTT PPP: 33 SEC (ref 22–37)
APTT PPP: 37 SEC (ref 22–37)
AST SERPL W P-5'-P-CCNC: 38 U/L (ref 0–45)
AST SERPL W P-5'-P-CCNC: ABNORMAL U/L (ref 0–45)
AST SERPL W P-5'-P-CCNC: NORMAL U/L (ref 0–45)
BACTERIA #/AREA URNS HPF: ABNORMAL /HPF
BARBITURATES UR QL: NEGATIVE
BASE DEFICIT BLDV-SCNC: 5.3 MMOL/L
BASE DEFICIT BLDV-SCNC: 6.8 MMOL/L
BASE DEFICIT BLDV-SCNC: 8 MMOL/L
BASE DEFICIT BLDV-SCNC: 8.3 MMOL/L
BASOPHILS # BLD AUTO: 0.1 10E9/L (ref 0–0.2)
BASOPHILS # BLD AUTO: 0.1 10E9/L (ref 0–0.2)
BASOPHILS NFR BLD AUTO: 0.2 %
BASOPHILS NFR BLD AUTO: 0.4 %
BENZODIAZ UR QL: NEGATIVE
BILIRUB DIRECT SERPL-MCNC: 1.2 MG/DL (ref 0–0.2)
BILIRUB SERPL-MCNC: 1.9 MG/DL (ref 0.2–1.3)
BILIRUB SERPL-MCNC: 2 MG/DL (ref 0.2–1.3)
BILIRUB UR QL STRIP: NEGATIVE
BUN SERPL-MCNC: 15 MG/DL (ref 7–30)
BUN SERPL-MCNC: 17 MG/DL (ref 7–30)
CALCIUM SERPL-MCNC: 7.5 MG/DL (ref 8.5–10.1)
CALCIUM SERPL-MCNC: 9.1 MG/DL (ref 8.5–10.1)
CANNABINOIDS UR QL SCN: POSITIVE
CHLORIDE SERPL-SCNC: 106 MMOL/L (ref 94–109)
CHLORIDE SERPL-SCNC: 113 MMOL/L (ref 94–109)
CO2 SERPL-SCNC: 18 MMOL/L (ref 20–32)
CO2 SERPL-SCNC: 25 MMOL/L (ref 20–32)
COCAINE UR QL: NEGATIVE
COLOR UR AUTO: ABNORMAL
CREAT SERPL-MCNC: 1.39 MG/DL (ref 0.66–1.25)
CREAT SERPL-MCNC: 1.39 MG/DL (ref 0.66–1.25)
DIFFERENTIAL METHOD BLD: ABNORMAL
DIFFERENTIAL METHOD BLD: ABNORMAL
EOSINOPHIL # BLD AUTO: 0 10E9/L (ref 0–0.7)
EOSINOPHIL # BLD AUTO: 0 10E9/L (ref 0–0.7)
EOSINOPHIL NFR BLD AUTO: 0.1 %
EOSINOPHIL NFR BLD AUTO: 0.2 %
ERYTHROCYTE [DISTWIDTH] IN BLOOD BY AUTOMATED COUNT: 27.7 % (ref 10–15)
ERYTHROCYTE [DISTWIDTH] IN BLOOD BY AUTOMATED COUNT: ABNORMAL % (ref 10–15)
ETHANOL SERPL-MCNC: <0.01 G/DL
ETHANOL SERPL-MCNC: <0.01 G/DL
GFR SERPL CREATININE-BSD FRML MDRD: 54 ML/MIN/{1.73_M2}
GFR SERPL CREATININE-BSD FRML MDRD: 54 ML/MIN/{1.73_M2}
GLUCOSE BLDC GLUCOMTR-MCNC: 108 MG/DL (ref 70–99)
GLUCOSE SERPL-MCNC: 121 MG/DL (ref 70–99)
GLUCOSE SERPL-MCNC: 79 MG/DL (ref 70–99)
GLUCOSE UR STRIP-MCNC: NEGATIVE MG/DL
HCO3 BLDV-SCNC: 17 MMOL/L (ref 21–28)
HCO3 BLDV-SCNC: 18 MMOL/L (ref 21–28)
HCO3 BLDV-SCNC: 18 MMOL/L (ref 21–28)
HCO3 BLDV-SCNC: 20 MMOL/L (ref 21–28)
HCT VFR BLD AUTO: 27.4 % (ref 40–53)
HCT VFR BLD AUTO: 32.4 % (ref 40–53)
HEMOCCULT STL QL: NEGATIVE
HGB BLD-MCNC: 10.2 G/DL (ref 13.3–17.7)
HGB BLD-MCNC: 8.3 G/DL (ref 13.3–17.7)
HGB UR QL STRIP: ABNORMAL
IMM GRANULOCYTES # BLD: 0.3 10E9/L (ref 0–0.4)
IMM GRANULOCYTES # BLD: 0.6 10E9/L (ref 0–0.4)
IMM GRANULOCYTES NFR BLD: 1.8 %
IMM GRANULOCYTES NFR BLD: 2.2 %
INR PPP: 1.47 (ref 0.86–1.14)
INR PPP: 1.64 (ref 0.86–1.14)
INTERNAL QC OK POCT: NO
KETONES UR STRIP-MCNC: 5 MG/DL
LACTATE BLD-SCNC: 1.4 MMOL/L (ref 0.7–2)
LACTATE BLD-SCNC: 1.8 MMOL/L (ref 0.7–2)
LEUKOCYTE ESTERASE UR QL STRIP: ABNORMAL
LIPASE SERPL-CCNC: 192 U/L (ref 73–393)
LYMPHOCYTES # BLD AUTO: 0.4 10E9/L (ref 0.8–5.3)
LYMPHOCYTES # BLD AUTO: 0.8 10E9/L (ref 0.8–5.3)
LYMPHOCYTES NFR BLD AUTO: 1.5 %
LYMPHOCYTES NFR BLD AUTO: 5 %
MAGNESIUM SERPL-MCNC: 1.7 MG/DL (ref 1.6–2.3)
MCH RBC QN AUTO: 32.7 PG (ref 26.5–33)
MCH RBC QN AUTO: 32.7 PG (ref 26.5–33)
MCHC RBC AUTO-ENTMCNC: 30.3 G/DL (ref 31.5–36.5)
MCHC RBC AUTO-ENTMCNC: 31.5 G/DL (ref 31.5–36.5)
MCV RBC AUTO: 104 FL (ref 78–100)
MCV RBC AUTO: 108 FL (ref 78–100)
MONOCYTES # BLD AUTO: 1.7 10E9/L (ref 0–1.3)
MONOCYTES # BLD AUTO: 2.1 10E9/L (ref 0–1.3)
MONOCYTES NFR BLD AUTO: 13.9 %
MONOCYTES NFR BLD AUTO: 6 %
NEUTROPHILS # BLD AUTO: 11.9 10E9/L (ref 1.6–8.3)
NEUTROPHILS # BLD AUTO: 25.6 10E9/L (ref 1.6–8.3)
NEUTROPHILS NFR BLD AUTO: 78.7 %
NEUTROPHILS NFR BLD AUTO: 90 %
NITRATE UR QL: NEGATIVE
NRBC # BLD AUTO: 0 10*3/UL
NRBC # BLD AUTO: 0 10*3/UL
NRBC BLD AUTO-RTO: 0 /100
NRBC BLD AUTO-RTO: 0 /100
O2/TOTAL GAS SETTING VFR VENT: ABNORMAL %
OPIATES UR QL SCN: NEGATIVE
PCO2 BLDV: 29 MM HG (ref 40–50)
PCO2 BLDV: 35 MM HG (ref 40–50)
PCO2 BLDV: 35 MM HG (ref 40–50)
PCO2 BLDV: 40 MM HG (ref 40–50)
PCP UR QL SCN: NEGATIVE
PH BLDV: 7.26 PH (ref 7.32–7.43)
PH BLDV: 7.31 PH (ref 7.32–7.43)
PH BLDV: 7.36 PH (ref 7.32–7.43)
PH BLDV: 7.39 PH (ref 7.32–7.43)
PH UR STRIP: 5 PH (ref 5–7)
PHOSPHATE SERPL-MCNC: 1.2 MG/DL (ref 2.5–4.5)
PLATELET # BLD AUTO: 158 10E9/L (ref 150–450)
PLATELET # BLD AUTO: 201 10E9/L (ref 150–450)
PO2 BLDV: 46 MM HG (ref 25–47)
PO2 BLDV: 49 MM HG (ref 25–47)
PO2 BLDV: 54 MM HG (ref 25–47)
PO2 BLDV: 56 MM HG (ref 25–47)
POTASSIUM SERPL-SCNC: 3.7 MMOL/L (ref 3.4–5.3)
POTASSIUM SERPL-SCNC: 3.8 MMOL/L (ref 3.4–5.3)
PROT SERPL-MCNC: 5.3 G/DL (ref 6.8–8.8)
PROT SERPL-MCNC: 6.9 G/DL (ref 6.8–8.8)
RBC # BLD AUTO: 2.54 10E12/L (ref 4.4–5.9)
RBC # BLD AUTO: 3.12 10E12/L (ref 4.4–5.9)
RBC #/AREA URNS AUTO: 36 /HPF (ref 0–2)
SARS-COV-2 PCR COMMENT: NORMAL
SARS-COV-2 RNA SPEC QL NAA+PROBE: NEGATIVE
SARS-COV-2 RNA SPEC QL NAA+PROBE: NORMAL
SODIUM SERPL-SCNC: 138 MMOL/L (ref 133–144)
SODIUM SERPL-SCNC: 141 MMOL/L (ref 133–144)
SOURCE: ABNORMAL
SP GR UR STRIP: 1.01 (ref 1–1.03)
SPECIMEN SOURCE: NORMAL
SPECIMEN SOURCE: NORMAL
SQUAMOUS #/AREA URNS AUTO: 1 /HPF (ref 0–1)
TEST CARD LOT NUMBER: NORMAL
TROPONIN I SERPL-MCNC: <0.015 UG/L (ref 0–0.04)
UROBILINOGEN UR STRIP-MCNC: 0 MG/DL (ref 0–2)
WBC # BLD AUTO: 15.2 10E9/L (ref 4–11)
WBC # BLD AUTO: 28.5 10E9/L (ref 4–11)
WBC #/AREA URNS AUTO: >182 /HPF (ref 0–5)
WBC CLUMPS #/AREA URNS HPF: PRESENT /HPF

## 2020-06-12 PROCEDURE — 80307 DRUG TEST PRSMV CHEM ANLYZR: CPT | Performed by: EMERGENCY MEDICINE

## 2020-06-12 PROCEDURE — 80320 DRUG SCREEN QUANTALCOHOLS: CPT | Performed by: FAMILY MEDICINE

## 2020-06-12 PROCEDURE — 84075 ASSAY ALKALINE PHOSPHATASE: CPT

## 2020-06-12 PROCEDURE — U0003 INFECTIOUS AGENT DETECTION BY NUCLEIC ACID (DNA OR RNA); SEVERE ACUTE RESPIRATORY SYNDROME CORONAVIRUS 2 (SARS-COV-2) (CORONAVIRUS DISEASE [COVID-19]), AMPLIFIED PROBE TECHNIQUE, MAKING USE OF HIGH THROUGHPUT TECHNOLOGIES AS DESCRIBED BY CMS-2020-01-R: HCPCS | Performed by: EMERGENCY MEDICINE

## 2020-06-12 PROCEDURE — 70450 CT HEAD/BRAIN W/O DYE: CPT

## 2020-06-12 PROCEDURE — 84450 TRANSFERASE (AST) (SGOT): CPT | Performed by: INTERNAL MEDICINE

## 2020-06-12 PROCEDURE — 99285 EMERGENCY DEPT VISIT HI MDM: CPT | Mod: 25 | Performed by: INTERNAL MEDICINE

## 2020-06-12 PROCEDURE — 84460 ALANINE AMINO (ALT) (SGPT): CPT

## 2020-06-12 PROCEDURE — 84100 ASSAY OF PHOSPHORUS: CPT | Performed by: INTERNAL MEDICINE

## 2020-06-12 PROCEDURE — 99291 CRITICAL CARE FIRST HOUR: CPT | Mod: 24 | Performed by: ANESTHESIOLOGY

## 2020-06-12 PROCEDURE — 99285 EMERGENCY DEPT VISIT HI MDM: CPT | Mod: 25 | Performed by: EMERGENCY MEDICINE

## 2020-06-12 PROCEDURE — 82140 ASSAY OF AMMONIA: CPT | Performed by: EMERGENCY MEDICINE

## 2020-06-12 PROCEDURE — 96365 THER/PROPH/DIAG IV INF INIT: CPT | Performed by: INTERNAL MEDICINE

## 2020-06-12 PROCEDURE — 82803 BLOOD GASES ANY COMBINATION: CPT | Performed by: EMERGENCY MEDICINE

## 2020-06-12 PROCEDURE — 72125 CT NECK SPINE W/O DYE: CPT

## 2020-06-12 PROCEDURE — 25000125 ZZHC RX 250: Performed by: EMERGENCY MEDICINE

## 2020-06-12 PROCEDURE — 85730 THROMBOPLASTIN TIME PARTIAL: CPT | Performed by: INTERNAL MEDICINE

## 2020-06-12 PROCEDURE — 96375 TX/PRO/DX INJ NEW DRUG ADDON: CPT | Mod: 59 | Performed by: EMERGENCY MEDICINE

## 2020-06-12 PROCEDURE — 25000128 H RX IP 250 OP 636: Performed by: INTERNAL MEDICINE

## 2020-06-12 PROCEDURE — 73030 X-RAY EXAM OF SHOULDER: CPT | Mod: RT

## 2020-06-12 PROCEDURE — 82040 ASSAY OF SERUM ALBUMIN: CPT

## 2020-06-12 PROCEDURE — 87077 CULTURE AEROBIC IDENTIFY: CPT | Performed by: EMERGENCY MEDICINE

## 2020-06-12 PROCEDURE — 25800030 ZZH RX IP 258 OP 636

## 2020-06-12 PROCEDURE — 80320 DRUG SCREEN QUANTALCOHOLS: CPT | Performed by: INTERNAL MEDICINE

## 2020-06-12 PROCEDURE — 20000004 ZZH R&B ICU UMMC

## 2020-06-12 PROCEDURE — 36415 COLL VENOUS BLD VENIPUNCTURE: CPT | Performed by: STUDENT IN AN ORGANIZED HEALTH CARE EDUCATION/TRAINING PROGRAM

## 2020-06-12 PROCEDURE — 94002 VENT MGMT INPAT INIT DAY: CPT

## 2020-06-12 PROCEDURE — 87040 BLOOD CULTURE FOR BACTERIA: CPT | Performed by: EMERGENCY MEDICINE

## 2020-06-12 PROCEDURE — 80076 HEPATIC FUNCTION PANEL: CPT | Performed by: FAMILY MEDICINE

## 2020-06-12 PROCEDURE — 85730 THROMBOPLASTIN TIME PARTIAL: CPT | Performed by: FAMILY MEDICINE

## 2020-06-12 PROCEDURE — 25000128 H RX IP 250 OP 636: Performed by: STUDENT IN AN ORGANIZED HEALTH CARE EDUCATION/TRAINING PROGRAM

## 2020-06-12 PROCEDURE — 82803 BLOOD GASES ANY COMBINATION: CPT | Performed by: STUDENT IN AN ORGANIZED HEALTH CARE EDUCATION/TRAINING PROGRAM

## 2020-06-12 PROCEDURE — 31500 INSERT EMERGENCY AIRWAY: CPT | Mod: Z6 | Performed by: EMERGENCY MEDICINE

## 2020-06-12 PROCEDURE — 93010 ELECTROCARDIOGRAM REPORT: CPT | Mod: 59 | Performed by: EMERGENCY MEDICINE

## 2020-06-12 PROCEDURE — 25000128 H RX IP 250 OP 636

## 2020-06-12 PROCEDURE — 83735 ASSAY OF MAGNESIUM: CPT | Performed by: INTERNAL MEDICINE

## 2020-06-12 PROCEDURE — 25000128 H RX IP 250 OP 636: Performed by: EMERGENCY MEDICINE

## 2020-06-12 PROCEDURE — 96374 THER/PROPH/DIAG INJ IV PUSH: CPT | Mod: 59 | Performed by: EMERGENCY MEDICINE

## 2020-06-12 PROCEDURE — 87800 DETECT AGNT MULT DNA DIREC: CPT | Performed by: EMERGENCY MEDICINE

## 2020-06-12 PROCEDURE — 99292 CRITICAL CARE ADDL 30 MIN: CPT | Performed by: INTERNAL MEDICINE

## 2020-06-12 PROCEDURE — 87088 URINE BACTERIA CULTURE: CPT | Performed by: EMERGENCY MEDICINE

## 2020-06-12 PROCEDURE — 80048 BASIC METABOLIC PNL TOTAL CA: CPT

## 2020-06-12 PROCEDURE — 31500 INSERT EMERGENCY AIRWAY: CPT | Performed by: EMERGENCY MEDICINE

## 2020-06-12 PROCEDURE — 82803 BLOOD GASES ANY COMBINATION: CPT | Performed by: INTERNAL MEDICINE

## 2020-06-12 PROCEDURE — 25800030 ZZH RX IP 258 OP 636: Performed by: EMERGENCY MEDICINE

## 2020-06-12 PROCEDURE — 82247 BILIRUBIN TOTAL: CPT

## 2020-06-12 PROCEDURE — 83605 ASSAY OF LACTIC ACID: CPT | Performed by: INTERNAL MEDICINE

## 2020-06-12 PROCEDURE — 87186 SC STD MICRODIL/AGAR DIL: CPT | Performed by: EMERGENCY MEDICINE

## 2020-06-12 PROCEDURE — 72170 X-RAY EXAM OF PELVIS: CPT

## 2020-06-12 PROCEDURE — 81001 URINALYSIS AUTO W/SCOPE: CPT | Performed by: EMERGENCY MEDICINE

## 2020-06-12 PROCEDURE — 83605 ASSAY OF LACTIC ACID: CPT | Performed by: EMERGENCY MEDICINE

## 2020-06-12 PROCEDURE — 82140 ASSAY OF AMMONIA: CPT | Performed by: INTERNAL MEDICINE

## 2020-06-12 PROCEDURE — 40000281 ZZH STATISTIC TRANSPORT TIME EA 15 MIN

## 2020-06-12 PROCEDURE — 85610 PROTHROMBIN TIME: CPT | Performed by: INTERNAL MEDICINE

## 2020-06-12 PROCEDURE — 40000275 ZZH STATISTIC RCP TIME EA 10 MIN

## 2020-06-12 PROCEDURE — 83690 ASSAY OF LIPASE: CPT | Performed by: FAMILY MEDICINE

## 2020-06-12 PROCEDURE — 99291 CRITICAL CARE FIRST HOUR: CPT | Mod: 25 | Performed by: EMERGENCY MEDICINE

## 2020-06-12 PROCEDURE — 85025 COMPLETE CBC W/AUTO DIFF WBC: CPT | Performed by: FAMILY MEDICINE

## 2020-06-12 PROCEDURE — 84484 ASSAY OF TROPONIN QUANT: CPT | Performed by: FAMILY MEDICINE

## 2020-06-12 PROCEDURE — 40000986 XR CHEST PORT 1 VW

## 2020-06-12 PROCEDURE — 84155 ASSAY OF PROTEIN SERUM: CPT

## 2020-06-12 PROCEDURE — 87086 URINE CULTURE/COLONY COUNT: CPT | Performed by: EMERGENCY MEDICINE

## 2020-06-12 PROCEDURE — 25000128 H RX IP 250 OP 636: Performed by: SURGERY

## 2020-06-12 PROCEDURE — 82272 OCCULT BLD FECES 1-3 TESTS: CPT | Performed by: EMERGENCY MEDICINE

## 2020-06-12 PROCEDURE — 96376 TX/PRO/DX INJ SAME DRUG ADON: CPT | Mod: 59 | Performed by: EMERGENCY MEDICINE

## 2020-06-12 PROCEDURE — G0180 MD CERTIFICATION HHA PATIENT: HCPCS | Performed by: INTERNAL MEDICINE

## 2020-06-12 PROCEDURE — 5A1945Z RESPIRATORY VENTILATION, 24-96 CONSECUTIVE HOURS: ICD-10-PCS | Performed by: ANESTHESIOLOGY

## 2020-06-12 PROCEDURE — 40000276 ZZH STATISTIC RCP TIME ED VENT EA 10 MIN

## 2020-06-12 PROCEDURE — 85025 COMPLETE CBC W/AUTO DIFF WBC: CPT | Performed by: INTERNAL MEDICINE

## 2020-06-12 PROCEDURE — 99291 CRITICAL CARE FIRST HOUR: CPT | Mod: 25 | Performed by: INTERNAL MEDICINE

## 2020-06-12 PROCEDURE — 96361 HYDRATE IV INFUSION ADD-ON: CPT | Mod: 59 | Performed by: EMERGENCY MEDICINE

## 2020-06-12 PROCEDURE — 71101 X-RAY EXAM UNILAT RIBS/CHEST: CPT | Mod: RT

## 2020-06-12 PROCEDURE — 93005 ELECTROCARDIOGRAM TRACING: CPT | Performed by: EMERGENCY MEDICINE

## 2020-06-12 PROCEDURE — 37000011 ZZH ANESTHESIA WARD SERVICE: Performed by: NURSE ANESTHETIST, CERTIFIED REGISTERED

## 2020-06-12 PROCEDURE — 85610 PROTHROMBIN TIME: CPT | Performed by: FAMILY MEDICINE

## 2020-06-12 PROCEDURE — 96375 TX/PRO/DX INJ NEW DRUG ADDON: CPT | Performed by: INTERNAL MEDICINE

## 2020-06-12 PROCEDURE — HZ2ZZZZ DETOXIFICATION SERVICES FOR SUBSTANCE ABUSE TREATMENT: ICD-10-PCS | Performed by: SURGERY

## 2020-06-12 PROCEDURE — 96366 THER/PROPH/DIAG IV INF ADDON: CPT | Performed by: INTERNAL MEDICINE

## 2020-06-12 PROCEDURE — 00000146 ZZHCL STATISTIC GLUCOSE BY METER IP

## 2020-06-12 PROCEDURE — 80048 BASIC METABOLIC PNL TOTAL CA: CPT | Performed by: FAMILY MEDICINE

## 2020-06-12 PROCEDURE — 71260 CT THORAX DX C+: CPT

## 2020-06-12 PROCEDURE — C9803 HOPD COVID-19 SPEC COLLECT: HCPCS | Performed by: EMERGENCY MEDICINE

## 2020-06-12 RX ORDER — KETOROLAC TROMETHAMINE 15 MG/ML
15 INJECTION, SOLUTION INTRAMUSCULAR; INTRAVENOUS ONCE
Status: COMPLETED | OUTPATIENT
Start: 2020-06-12 | End: 2020-06-12

## 2020-06-12 RX ORDER — FOLIC ACID 1 MG/1
1 TABLET ORAL DAILY
Status: DISCONTINUED | OUTPATIENT
Start: 2020-06-15 | End: 2020-06-25 | Stop reason: HOSPADM

## 2020-06-12 RX ORDER — IOPAMIDOL 755 MG/ML
90 INJECTION, SOLUTION INTRAVASCULAR ONCE
Status: COMPLETED | OUTPATIENT
Start: 2020-06-12 | End: 2020-06-12

## 2020-06-12 RX ORDER — LORAZEPAM 2 MG/ML
1-2 INJECTION INTRAMUSCULAR EVERY 30 MIN PRN
Status: DISCONTINUED | OUTPATIENT
Start: 2020-06-12 | End: 2020-06-12 | Stop reason: HOSPADM

## 2020-06-12 RX ORDER — FENTANYL CITRATE 50 UG/ML
25-50 INJECTION, SOLUTION INTRAMUSCULAR; INTRAVENOUS
Status: DISCONTINUED | OUTPATIENT
Start: 2020-06-12 | End: 2020-06-13

## 2020-06-12 RX ORDER — LANOLIN ALCOHOL/MO/W.PET/CERES
100 CREAM (GRAM) TOPICAL DAILY
Status: DISCONTINUED | OUTPATIENT
Start: 2020-06-13 | End: 2020-06-12

## 2020-06-12 RX ORDER — LORAZEPAM 1 MG/1
1-2 TABLET ORAL EVERY 30 MIN PRN
Status: DISCONTINUED | OUTPATIENT
Start: 2020-06-12 | End: 2020-06-12 | Stop reason: HOSPADM

## 2020-06-12 RX ORDER — POTASSIUM CHLORIDE 750 MG/1
20-40 TABLET, EXTENDED RELEASE ORAL
Status: DISCONTINUED | OUTPATIENT
Start: 2020-06-12 | End: 2020-06-25 | Stop reason: HOSPADM

## 2020-06-12 RX ORDER — FOLIC ACID 1 MG/1
1 TABLET ORAL DAILY
Status: DISCONTINUED | OUTPATIENT
Start: 2020-06-13 | End: 2020-06-12

## 2020-06-12 RX ORDER — POTASSIUM CHLORIDE 7.45 MG/ML
10 INJECTION INTRAVENOUS
Status: DISCONTINUED | OUTPATIENT
Start: 2020-06-12 | End: 2020-06-25 | Stop reason: HOSPADM

## 2020-06-12 RX ORDER — DEXTROSE, SODIUM CHLORIDE, SODIUM LACTATE, POTASSIUM CHLORIDE, AND CALCIUM CHLORIDE 5; .6; .31; .03; .02 G/100ML; G/100ML; G/100ML; G/100ML; G/100ML
INJECTION, SOLUTION INTRAVENOUS CONTINUOUS
Status: DISCONTINUED | OUTPATIENT
Start: 2020-06-12 | End: 2020-06-13

## 2020-06-12 RX ORDER — FOLIC ACID 5 MG/ML
1 INJECTION, SOLUTION INTRAMUSCULAR; INTRAVENOUS; SUBCUTANEOUS DAILY
Status: COMPLETED | OUTPATIENT
Start: 2020-06-13 | End: 2020-06-14

## 2020-06-12 RX ORDER — ETOMIDATE 2 MG/ML
20 INJECTION INTRAVENOUS ONCE
Status: COMPLETED | OUTPATIENT
Start: 2020-06-12 | End: 2020-06-12

## 2020-06-12 RX ORDER — LORAZEPAM 2 MG/ML
0.5 INJECTION INTRAMUSCULAR ONCE
Status: COMPLETED | OUTPATIENT
Start: 2020-06-12 | End: 2020-06-12

## 2020-06-12 RX ORDER — FENTANYL CITRATE 50 UG/ML
25 INJECTION, SOLUTION INTRAMUSCULAR; INTRAVENOUS ONCE
Status: COMPLETED | OUTPATIENT
Start: 2020-06-12 | End: 2020-06-12

## 2020-06-12 RX ORDER — FLUMAZENIL 0.1 MG/ML
0.2 INJECTION, SOLUTION INTRAVENOUS
Status: DISCONTINUED | OUTPATIENT
Start: 2020-06-12 | End: 2020-06-25 | Stop reason: HOSPADM

## 2020-06-12 RX ORDER — DIAZEPAM 10 MG/2ML
10 INJECTION, SOLUTION INTRAMUSCULAR; INTRAVENOUS EVERY 6 HOURS
Status: DISCONTINUED | OUTPATIENT
Start: 2020-06-12 | End: 2020-06-13

## 2020-06-12 RX ORDER — LORAZEPAM 2 MG/ML
INJECTION INTRAMUSCULAR
Status: DISCONTINUED
Start: 2020-06-12 | End: 2020-06-12 | Stop reason: HOSPADM

## 2020-06-12 RX ORDER — NALOXONE HYDROCHLORIDE 0.4 MG/ML
.1-.4 INJECTION, SOLUTION INTRAMUSCULAR; INTRAVENOUS; SUBCUTANEOUS
Status: DISCONTINUED | OUTPATIENT
Start: 2020-06-12 | End: 2020-06-25 | Stop reason: HOSPADM

## 2020-06-12 RX ORDER — AMOXICILLIN 250 MG
2 CAPSULE ORAL 2 TIMES DAILY PRN
Status: DISCONTINUED | OUTPATIENT
Start: 2020-06-12 | End: 2020-06-14

## 2020-06-12 RX ORDER — ONDANSETRON 4 MG/1
4 TABLET, ORALLY DISINTEGRATING ORAL EVERY 6 HOURS PRN
Status: DISCONTINUED | OUTPATIENT
Start: 2020-06-12 | End: 2020-06-25 | Stop reason: HOSPADM

## 2020-06-12 RX ORDER — ALBUTEROL SULFATE 0.83 MG/ML
2.5 SOLUTION RESPIRATORY (INHALATION) EVERY 4 HOURS PRN
Status: DISCONTINUED | OUTPATIENT
Start: 2020-06-12 | End: 2020-06-25 | Stop reason: HOSPADM

## 2020-06-12 RX ORDER — FOLIC ACID 5 MG/ML
1 INJECTION, SOLUTION INTRAMUSCULAR; INTRAVENOUS; SUBCUTANEOUS ONCE
Status: DISCONTINUED | OUTPATIENT
Start: 2020-06-12 | End: 2020-06-12

## 2020-06-12 RX ORDER — LORAZEPAM 2 MG/ML
INJECTION INTRAMUSCULAR
Status: COMPLETED
Start: 2020-06-12 | End: 2020-06-12

## 2020-06-12 RX ORDER — SODIUM CHLORIDE, SODIUM LACTATE, POTASSIUM CHLORIDE, CALCIUM CHLORIDE 600; 310; 30; 20 MG/100ML; MG/100ML; MG/100ML; MG/100ML
INJECTION, SOLUTION INTRAVENOUS
Status: COMPLETED
Start: 2020-06-12 | End: 2020-06-12

## 2020-06-12 RX ORDER — PROPOFOL 10 MG/ML
5-75 INJECTION, EMULSION INTRAVENOUS CONTINUOUS
Status: DISCONTINUED | OUTPATIENT
Start: 2020-06-12 | End: 2020-06-12

## 2020-06-12 RX ORDER — ONDANSETRON 2 MG/ML
4 INJECTION INTRAMUSCULAR; INTRAVENOUS EVERY 6 HOURS PRN
Status: DISCONTINUED | OUTPATIENT
Start: 2020-06-12 | End: 2020-06-25 | Stop reason: HOSPADM

## 2020-06-12 RX ORDER — DEXTROSE MONOHYDRATE 25 G/50ML
25-50 INJECTION, SOLUTION INTRAVENOUS
Status: DISCONTINUED | OUTPATIENT
Start: 2020-06-12 | End: 2020-06-25 | Stop reason: HOSPADM

## 2020-06-12 RX ORDER — SODIUM CHLORIDE 9 MG/ML
INJECTION, SOLUTION INTRAVENOUS CONTINUOUS
Status: DISCONTINUED | OUTPATIENT
Start: 2020-06-12 | End: 2020-06-12 | Stop reason: HOSPADM

## 2020-06-12 RX ORDER — BISACODYL 10 MG
10 SUPPOSITORY, RECTAL RECTAL DAILY PRN
Status: DISCONTINUED | OUTPATIENT
Start: 2020-06-12 | End: 2020-06-25 | Stop reason: HOSPADM

## 2020-06-12 RX ORDER — POTASSIUM CHLORIDE 1.5 G/1.58G
20-40 POWDER, FOR SOLUTION ORAL
Status: DISCONTINUED | OUTPATIENT
Start: 2020-06-12 | End: 2020-06-25 | Stop reason: HOSPADM

## 2020-06-12 RX ORDER — AMOXICILLIN 250 MG
1 CAPSULE ORAL 2 TIMES DAILY PRN
Status: DISCONTINUED | OUTPATIENT
Start: 2020-06-12 | End: 2020-06-14

## 2020-06-12 RX ORDER — LANOLIN ALCOHOL/MO/W.PET/CERES
100 CREAM (GRAM) TOPICAL DAILY
Status: DISCONTINUED | OUTPATIENT
Start: 2020-06-20 | End: 2020-06-14

## 2020-06-12 RX ORDER — MAGNESIUM SULFATE HEPTAHYDRATE 40 MG/ML
2 INJECTION, SOLUTION INTRAVENOUS DAILY PRN
Status: DISCONTINUED | OUTPATIENT
Start: 2020-06-12 | End: 2020-06-25 | Stop reason: HOSPADM

## 2020-06-12 RX ORDER — CLONIDINE HYDROCHLORIDE 0.1 MG/1
0.1 TABLET ORAL 2 TIMES DAILY
Status: DISCONTINUED | OUTPATIENT
Start: 2020-06-12 | End: 2020-06-12 | Stop reason: HOSPADM

## 2020-06-12 RX ORDER — MULTIPLE VITAMINS W/ MINERALS TAB 9MG-400MCG
1 TAB ORAL DAILY
Status: DISCONTINUED | OUTPATIENT
Start: 2020-06-13 | End: 2020-06-12 | Stop reason: HOSPADM

## 2020-06-12 RX ORDER — POLYETHYLENE GLYCOL 3350 17 G/17G
17 POWDER, FOR SOLUTION ORAL DAILY PRN
Status: DISCONTINUED | OUTPATIENT
Start: 2020-06-12 | End: 2020-06-14

## 2020-06-12 RX ORDER — LANOLIN ALCOHOL/MO/W.PET/CERES
100 CREAM (GRAM) TOPICAL 3 TIMES DAILY
Status: DISCONTINUED | OUTPATIENT
Start: 2020-06-15 | End: 2020-06-14

## 2020-06-12 RX ORDER — POTASSIUM CHLORIDE 29.8 MG/ML
20 INJECTION INTRAVENOUS
Status: DISCONTINUED | OUTPATIENT
Start: 2020-06-12 | End: 2020-06-25 | Stop reason: HOSPADM

## 2020-06-12 RX ORDER — LANOLIN ALCOHOL/MO/W.PET/CERES
100 CREAM (GRAM) TOPICAL DAILY
Status: DISCONTINUED | OUTPATIENT
Start: 2020-06-13 | End: 2020-06-12 | Stop reason: HOSPADM

## 2020-06-12 RX ORDER — POTASSIUM CL/LIDO/0.9 % NACL 10MEQ/0.1L
10 INTRAVENOUS SOLUTION, PIGGYBACK (ML) INTRAVENOUS
Status: DISCONTINUED | OUTPATIENT
Start: 2020-06-12 | End: 2020-06-25 | Stop reason: HOSPADM

## 2020-06-12 RX ORDER — METOPROLOL TARTRATE 1 MG/ML
5 INJECTION, SOLUTION INTRAVENOUS EVERY 6 HOURS PRN
Status: DISCONTINUED | OUTPATIENT
Start: 2020-06-12 | End: 2020-06-14

## 2020-06-12 RX ORDER — MAGNESIUM SULFATE HEPTAHYDRATE 40 MG/ML
4 INJECTION, SOLUTION INTRAVENOUS EVERY 4 HOURS PRN
Status: DISCONTINUED | OUTPATIENT
Start: 2020-06-12 | End: 2020-06-25 | Stop reason: HOSPADM

## 2020-06-12 RX ORDER — HALOPERIDOL 5 MG/ML
5 INJECTION INTRAMUSCULAR EVERY 4 HOURS PRN
Status: DISCONTINUED | OUTPATIENT
Start: 2020-06-12 | End: 2020-06-20

## 2020-06-12 RX ORDER — ROCURONIUM BROMIDE 50 MG/5 ML
50 SYRINGE (ML) INTRAVENOUS ONCE
Status: COMPLETED | OUTPATIENT
Start: 2020-06-12 | End: 2020-06-12

## 2020-06-12 RX ORDER — PIPERACILLIN SODIUM, TAZOBACTAM SODIUM 3; .375 G/15ML; G/15ML
3.38 INJECTION, POWDER, LYOPHILIZED, FOR SOLUTION INTRAVENOUS EVERY 6 HOURS
Status: DISCONTINUED | OUTPATIENT
Start: 2020-06-12 | End: 2020-06-13

## 2020-06-12 RX ORDER — DEXMEDETOMIDINE HYDROCHLORIDE 4 UG/ML
0.2-0.7 INJECTION, SOLUTION INTRAVENOUS CONTINUOUS
Status: DISCONTINUED | OUTPATIENT
Start: 2020-06-13 | End: 2020-06-15

## 2020-06-12 RX ORDER — MULTIPLE VITAMINS W/ MINERALS TAB 9MG-400MCG
1 TAB ORAL DAILY
Status: DISCONTINUED | OUTPATIENT
Start: 2020-06-13 | End: 2020-06-14

## 2020-06-12 RX ORDER — NICOTINE POLACRILEX 4 MG
15-30 LOZENGE BUCCAL
Status: DISCONTINUED | OUTPATIENT
Start: 2020-06-12 | End: 2020-06-25 | Stop reason: HOSPADM

## 2020-06-12 RX ORDER — NALOXONE HYDROCHLORIDE 0.4 MG/ML
.1-.4 INJECTION, SOLUTION INTRAMUSCULAR; INTRAVENOUS; SUBCUTANEOUS
Status: DISCONTINUED | OUTPATIENT
Start: 2020-06-12 | End: 2020-06-15

## 2020-06-12 RX ORDER — ONDANSETRON 2 MG/ML
4 INJECTION INTRAMUSCULAR; INTRAVENOUS ONCE
Status: COMPLETED | OUTPATIENT
Start: 2020-06-12 | End: 2020-06-12

## 2020-06-12 RX ORDER — PROPOFOL 10 MG/ML
5-75 INJECTION, EMULSION INTRAVENOUS CONTINUOUS
Status: DISCONTINUED | OUTPATIENT
Start: 2020-06-12 | End: 2020-06-12 | Stop reason: HOSPADM

## 2020-06-12 RX ORDER — MULTIPLE VITAMINS W/ MINERALS TAB 9MG-400MCG
1 TAB ORAL DAILY
Status: DISCONTINUED | OUTPATIENT
Start: 2020-06-13 | End: 2020-06-12

## 2020-06-12 RX ORDER — LORAZEPAM 2 MG/ML
4 INJECTION INTRAMUSCULAR ONCE
Status: DISCONTINUED | OUTPATIENT
Start: 2020-06-12 | End: 2020-06-12 | Stop reason: HOSPADM

## 2020-06-12 RX ORDER — FOLIC ACID 1 MG/1
1 TABLET ORAL DAILY
Status: DISCONTINUED | OUTPATIENT
Start: 2020-06-13 | End: 2020-06-12 | Stop reason: HOSPADM

## 2020-06-12 RX ADMIN — KETOROLAC TROMETHAMINE 15 MG: 15 INJECTION, SOLUTION INTRAMUSCULAR; INTRAVENOUS at 15:03

## 2020-06-12 RX ADMIN — LORAZEPAM 4 MG: 2 INJECTION INTRAMUSCULAR; INTRAVENOUS at 15:52

## 2020-06-12 RX ADMIN — FENTANYL CITRATE 25 MCG: 50 INJECTION, SOLUTION INTRAMUSCULAR; INTRAVENOUS at 20:56

## 2020-06-12 RX ADMIN — ROCURONIUM BROMIDE 50 MG: 50 INJECTION, SOLUTION INTRAVENOUS at 16:32

## 2020-06-12 RX ADMIN — PROPOFOL 5 MCG/KG/MIN: 10 INJECTION, EMULSION INTRAVENOUS at 20:48

## 2020-06-12 RX ADMIN — LORAZEPAM 2 MG: 2 INJECTION INTRAMUSCULAR; INTRAVENOUS at 15:35

## 2020-06-12 RX ADMIN — LORAZEPAM 4 MG: 2 INJECTION INTRAMUSCULAR; INTRAVENOUS at 16:04

## 2020-06-12 RX ADMIN — DIAZEPAM 10 MG: 5 INJECTION, SOLUTION INTRAMUSCULAR; INTRAVENOUS at 23:34

## 2020-06-12 RX ADMIN — Medication 100 MG: at 16:20

## 2020-06-12 RX ADMIN — SODIUM CHLORIDE 500 ML: 9 INJECTION, SOLUTION INTRAVENOUS at 11:26

## 2020-06-12 RX ADMIN — LORAZEPAM 0.5 MG: 2 INJECTION INTRAMUSCULAR; INTRAVENOUS at 15:02

## 2020-06-12 RX ADMIN — SODIUM CHLORIDE, POTASSIUM CHLORIDE, SODIUM LACTATE AND CALCIUM CHLORIDE 1000 ML: 600; 310; 30; 20 INJECTION, SOLUTION INTRAVENOUS at 23:49

## 2020-06-12 RX ADMIN — ONDANSETRON 4 MG: 2 INJECTION INTRAMUSCULAR; INTRAVENOUS at 15:02

## 2020-06-12 RX ADMIN — SODIUM CHLORIDE 62 ML: 9 INJECTION, SOLUTION INTRAVENOUS at 13:19

## 2020-06-12 RX ADMIN — ETOMIDATE 20 MG: 2 INJECTION, SOLUTION INTRAVENOUS at 16:19

## 2020-06-12 RX ADMIN — PIPERACILLIN AND TAZOBACTAM 3.38 G: 3; .375 INJECTION, POWDER, FOR SOLUTION INTRAVENOUS at 22:21

## 2020-06-12 RX ADMIN — SODIUM CHLORIDE, SODIUM LACTATE, POTASSIUM CHLORIDE, CALCIUM CHLORIDE AND DEXTROSE MONOHYDRATE: 5; 600; 310; 30; 20 INJECTION, SOLUTION INTRAVENOUS at 23:34

## 2020-06-12 RX ADMIN — PROPOFOL 5 MCG/KG/MIN: 10 INJECTION, EMULSION INTRAVENOUS at 16:51

## 2020-06-12 RX ADMIN — FOLIC ACID: 5 INJECTION, SOLUTION INTRAMUSCULAR; INTRAVENOUS; SUBCUTANEOUS at 16:30

## 2020-06-12 RX ADMIN — DEXMEDETOMIDINE 0.2 MCG/KG/HR: 100 INJECTION, SOLUTION, CONCENTRATE INTRAVENOUS at 16:30

## 2020-06-12 RX ADMIN — IOPAMIDOL 90 ML: 755 INJECTION, SOLUTION INTRAVENOUS at 13:19

## 2020-06-12 RX ADMIN — SODIUM CHLORIDE: 9 INJECTION, SOLUTION INTRAVENOUS at 16:30

## 2020-06-12 ASSESSMENT — MIFFLIN-ST. JEOR: SCORE: 1586.9

## 2020-06-12 ASSESSMENT — LIFESTYLE VARIABLES: TOBACCO_USE: 1

## 2020-06-12 ASSESSMENT — COPD QUESTIONNAIRES: COPD: 1

## 2020-06-12 NOTE — ED NOTES
Pt yana area cleaned with wipe, redness present in area but no open areas seen. Depends put on. Propofol gtt at 2.5 ml per hour where it was started, has not needed increase of sedation. NG draining small amount yellowish green fluid, initial output was bloody as pt had blood in his mouth prior to intubation and OG placement. IV site L wrist wrapped in coban, IV pulled loose when 4 mg of ativan was being given when pt was seizing prior to intubation. Chong catheter has about 100 ml cloudy dark yellow urine in bag.

## 2020-06-12 NOTE — ED NOTES
Pt electively intubated to protect airway.  7.5 ETT 22 @ lip.  Initial settings VC 14 500 30% +7.  ETCO2 31. BBS diminished bilaterally.  Good calorimetric color change post intubation.  Sats 98%.

## 2020-06-12 NOTE — ED NOTES
Pt belonging bag marked with pt label, contains t- shirt with $20 in pocket, sweat pants, 3 bottles of medications.; oxycodone 5 mg 3 tablets, trazodone 50 mg 11 tablets,tramadol 50 mg 6 tablets.

## 2020-06-12 NOTE — ED NOTES
Pt reports left hip replacement last Friday.  [facility] around [facility].   HomeHealth found pt on the ground and found pt on the floor.   Pt denies ETOH  But EMS reported to staff.

## 2020-06-12 NOTE — ED PROVIDER NOTES
"  History     Chief Complaint   Patient presents with     Fall     ? ETOH , jaundice, altered.      HPI  Abhijeet Mccauley is a 59 year old male with past medical history significant for alcohol abuse esophageal varices, acute inflammatory demyelinating polyneuropathy, COPD, ulcerative colitis and renal insufficiency who presents emergency department complaining of fall.  Patient had left hip replacement 2 weeks ago for avascular necrosis and has been home with health care.  He states his balance is off and he is fallen several times the last one being this morning where he lost balance and landed on his right side. He reportedly was found down in his garage.  he is complaining of right shoulder and right chest wall pain.  He is not sure if he hit his head denies neck pain.  He denies abdominal pain or back pain.  He denies any pain in his left hip.  He denies any focal numbness weakness in extremity patient is slightly confused at times thinks it Saturday.  He has a history of alcohol abuse and states he has been still drinking but drinking less than usual.    Allergies:  Allergies   Allergen Reactions     Blood Transfusion Related (Informational Only)      Patient has a history of a clinically significant antibody against RBC antigens.  A delay in compatible RBCs may occur.     Famotidine Unknown and Other (See Comments)     Severe abdominal cramps     Cyclobenzaprine Hives     Methocarbamol Other (See Comments)     Made pt's \"face break out\"     Pepcid Cramps     Severe abdominal cramps     Vancomycin Other (See Comments)     hallucinations     Vfend Other (See Comments)     IV - cold sweats, Iron tablet makes him nauseated and stomach ached     Hydrocodone-Acetaminophen Rash       Problem List:    Patient Active Problem List    Diagnosis Date Noted     Status post total hip replacement, left 05/29/2020     Priority: Medium     Iron deficiency anemia due to chronic blood loss 05/20/2020     Priority: Medium "     Esophageal varices (H) 02/27/2020     Priority: Medium     Overview:   On propanolol       GI bleed 02/27/2020     Priority: Medium     Intermediate risk for ASCVD (arteriosclerotic cardiovascular disease) 02/24/2020     Priority: Medium     Primary osteoarthritis of left knee 10/03/2019     Priority: Medium     AIDP (acute inflammatory demyelinating polyneuropathy) (H) 05/03/2019     Priority: Medium     Normocytic anemia 05/17/2018     Priority: Medium     Leukocytosis with increased monocytes 05/17/2018     Priority: Medium     Generalized weakness 05/17/2018     Priority: Medium     Tubular adenoma of colon 03/23/2018     Priority: Medium     Collected: 3/18/2018   Received: 3/19/2018   Reported: 3/22/2018 19:19   Ordering Phy(s): SASKIA LEE     For improved result formatting, select 'View Enhanced Report Format' under    Linked Documents section.     SPECIMEN(S):   A: Splenic flexure polyp   B: Rectal polyp     FINAL DIAGNOSIS:   A.  Colon, splenic flexure, mucosal biopsies:   - Tubular adenoma.   - Negative for high-grade dysplasia and malignancy.     B.  Rectum, mucosal biopsy:   - Tubular adenoma.   - Negative for high-grade dysplasia and malignancy.        Peptic ulcer disease 03/16/2018     Priority: Medium     Alcohol dependence (H) 03/16/2018     Priority: Medium     Tobacco dependence syndrome 03/16/2018     Priority: Medium     Mild intermittent asthma without complication 11/13/2017     Priority: Medium     CKD (chronic kidney disease) stage 3, GFR 30-59 ml/min (H) 08/16/2017     Priority: Medium     Rosacea 08/16/2017     Priority: Medium     Sensorineural hearing loss, asymmetrical 03/28/2017     Priority: Medium     Chronic midline low back pain with bilateral sciatica 07/13/2016     Priority: Medium     Overview:   Follows with pain clinic: has chronic neck and low back pain  Per 4/2016 visit:  1. Discussed options and plan with the patient.   Urine toxicology screen 1/7/16 was THC positive  and therefore due to his already delicate life balance, we will no longer prescribe opioid medications.  I believe the patient does have pain and plan to continue to see and treat the patient with conservative pain management options.  Can consider Clonodine for any withdrawel symptoms.  2. Continue pool therapy and working out at [facility] once insurance issues are figured out.  3. Start using TENS unit for right knee pain once it arrives.  4. Start Cymbalta 30mg, one tab daily. #30. Ref 2.  6. Continue Zanaflex as previously prescribed.  7. Continue acupuncture/Chiropractic visits twice a week.  8. RTC 2 months        Thrombocytopenia (H) 11/11/2014     Priority: Medium     Universal ulcerative (chronic) colitis(556.6) (H) 11/11/2014     Priority: Medium     Alcoholic cirrhosis of liver (H) 11/04/2014     Priority: Medium     Coagulation defect (H) 11/04/2014     Priority: Medium     Osteoarthrosis 02/21/2014     Priority: Medium     Essential hypertension 03/28/2011     Priority: Medium     Problem list name updated by automated process. Provider to review       Acute myeloid leukemia in remission (H) 03/28/2011     Priority: Medium     S/p chemo, did not need bone marrow transplant          Past Medical History:    Past Medical History:   Diagnosis Date     Alcohol dependence (H)      Alcoholic cirrhosis of liver (H)      AML (acute myeloid leukemia) in remission (H)      Chronic obstructive pulmonary disease 5/17/2018     COPD (chronic obstructive pulmonary disease) (H)      History of pulmonary embolus (PE)      Hypertension      PUD (peptic ulcer disease)      Tobacco dependence      Ulcerative colitis (H)        Past Surgical History:    Past Surgical History:   Procedure Laterality Date     ARTHROPLASTY HIP Left 5/29/2020    Procedure: ARTHROPLASTY, HIP, TOTAL;  Surgeon: Carlton Jones MD;  Location: [facility]     AS TOTAL KNEE ARTHROPLASTY Left      BONE MARROW BIOPSY, BONE SPECIMEN, NEEDLE/TROCAR N/A  6/12/2018    Procedure: BIOPSY BONE MARROW;  Bone Marrow Biopsy;  Surgeon: Demar Sapp MD;  Location: [facility]     ESOPHAGOSCOPY, GASTROSCOPY, DUODENOSCOPY (EGD), COMBINED N/A 2/8/2018    Procedure: COMBINED ESOPHAGOSCOPY, GASTROSCOPY, DUODENOSCOPY (EGD), BIOPSY SINGLE OR MULTIPLE;  gastroscopy with biopsies;  Surgeon: Raz Cobb MD;  Location: [facility]     ESOPHAGOSCOPY, GASTROSCOPY, DUODENOSCOPY (EGD), COMBINED N/A 3/18/2018    Procedure: COMBINED ESOPHAGOSCOPY, GASTROSCOPY, DUODENOSCOPY (EGD);;  Surgeon: Raz Cobb MD;  Location: [facility]     ESOPHAGOSCOPY, GASTROSCOPY, DUODENOSCOPY (EGD), COMBINED N/A 2/28/2020    Procedure: ESOPHAGOGASTRODUODENOSCOPY, WITH BIOPSY;  Surgeon: Chente Mclaughlin DO;  Location: [facility]     LACERATION REPAIR Right     Right leg     PHACOEMULSIFICATION WITH STANDARD INTRAOCULAR LENS IMPLANT Left 7/1/2019    Procedure: cataract removal with implant.;  Surgeon: Adrian Oliveira MD;  Location: [facility]     PHACOEMULSIFICATION WITH STANDARD INTRAOCULAR LENS IMPLANT Right 7/29/2019    Procedure: Cataract removal with implant.;  Surgeon: Adrian Oliveira MD;  Location: [facility]       Family History:    Family History   Problem Relation Age of Onset     Hypertension Mother      Coronary Artery Disease Father         MI       Social History:  Marital Status:  Single [1]  Social History     Tobacco Use     Smoking status: Current Every Day Smoker     Packs/day: 0.50     Years: 30.00     Pack years: 15.00     Types: Cigarettes     Smokeless tobacco: Never Used     Tobacco comment: 5 cigarettes a day    Substance Use Topics     Alcohol use: Yes     Comment: Down to 3 beers per day.  Sometimes a cocktail also.     Drug use: Yes     Types: Marijuana        Medications:    acetaminophen 500 MG PO tablet  albuterol (VENTOLIN HFA) 108 (90 Base) MCG/ACT inhaler  aspirin (ASA) 81 MG EC tablet  atorvastatin 20 MG PO tablet  baclofen (LIORESAL) 10 MG tablet  buPROPion  "(WELLBUTRIN SR) 150 MG 12 hr tablet  desonide (DESOWEN) 0.05 % external cream  diclofenac (VOLTAREN) 1 % topical gel  docusate sodium (COLACE) 100 MG tablet  DULERA 200-5 MCG/ACT inhaler  folic acid (FOLVITE) 1 MG tablet  furosemide (LASIX) 20 MG tablet  gabapentin (NEURONTIN) 300 MG capsule  hydrOXYzine (ATARAX) 25 MG tablet  loratadine (CLARITIN) 10 MG tablet  montelukast (SINGULAIR) 10 MG tablet  nicotine 2 MG MT lozenge  nystatin (MYCOSTATIN) 295959 UNIT/ML suspension  order for DME  order for DME  order for DME  order for DME  order for DME  oxyCODONE (ROXICODONE) 5 MG tablet  pantoprazole (PROTONIX) 40 MG EC tablet  polyethylene glycol (MIRALAX) 17 g packet  potassium chloride ER (KLOR-CON M) 10 MEQ CR tablet  propranolol (INDERAL) 20 MG tablet  rifaximin (XIFAXAN) 550 MG TABS tablet  senna-docusate (SENOKOT-S/PERICOLACE) 8.6-50 MG tablet  spironolactone (ALDACTONE) 50 MG tablet  tiotropium (SPIRIVA) 18 MCG capsule  traMADol (ULTRAM) 50 MG tablet  traZODone (DESYREL) 50 MG tablet  trolamine salicylate (ASPERCREME) 10 % external cream          Review of Systems  All systems reviewed and other than pertinent positives negatives in HPI all other systems are negative.  Physical Exam   BP: (!) 158/82  Heart Rate: 84  Temp: 98.1  F (36.7  C)  Resp: 18  Height: 167.6 cm (5' 6\")  Weight: 83.9 kg (185 lb)  SpO2: 98 %      Physical Exam  Vitals signs and nursing note reviewed.   Constitutional:       Appearance: He is ill-appearing. He is not toxic-appearing.      Comments: Patient is dishelved mild distress due to pain.   HENT:      Head: Normocephalic and atraumatic.      Right Ear: Tympanic membrane normal.      Left Ear: Tympanic membrane normal.      Nose: Nose normal. No congestion.      Mouth/Throat:      Mouth: Mucous membranes are moist.      Pharynx: Oropharynx is clear.   Eyes:      Extraocular Movements: Extraocular movements intact.      Conjunctiva/sclera: Conjunctivae normal.      Pupils: Pupils are " equal, round, and reactive to light.   Neck:      Musculoskeletal: Normal range of motion and neck supple. No neck rigidity.      Comments: There is no posterior neck tenderness.  Cardiovascular:      Rate and Rhythm: Regular rhythm. Tachycardia present.      Pulses: Normal pulses.      Heart sounds: Normal heart sounds. No murmur.   Pulmonary:      Comments: There is significant tenderness to palpation of the right lateral anterior chest and right posterior lateral chest wall.  Question of slight deformity but no crepitance or erythema or swelling is noted.  Breath sounds are decreased at bases right greater than left.  No rhonchi wheezing or rales is heard.  Abdominal:      General: Abdomen is flat. There is no distension.      Palpations: Abdomen is soft.      Tenderness: There is no abdominal tenderness.   Genitourinary:     Comments: Normal tone heme-negative stool.  Musculoskeletal: Normal range of motion.      Right lower leg: No edema.      Left lower leg: No edema.      Comments: Patient was logrolled and there was no midline back tenderness noted.  Patient has incision along left lateral hip region.  No erythema edema.  He has tenderness to palpation of the right shoulder.  He has pain with movement of the right shoulder.  No elbow or wrist tenderness.  Left hip with incisional scar well-healed no obvious significant erythema or drainage.  Moving his lower extremities well no significant edema in the calf or tenderness good plantarflexion and dorsiflexion ankles bilaterally.   Lymphadenopathy:      Cervical: No cervical adenopathy.   Skin:     General: Skin is warm and dry.   Neurological:      General: No focal deficit present.      Mental Status: He is alert.      Cranial Nerves: No cranial nerve deficit.      Motor: No weakness.      Coordination: Coordination normal.      Comments: Patient is mildly confused.  Alert and oriented x2   Psychiatric:         Mood and Affect: Mood normal.         ED Course         [facility]    -Intubation    Date/Time: 6/12/2020 4:15 PM  Performed by: Parrish Gastelum MD  Authorized by: Parrish Gastelum MD       PRE-PROCEDURE DETAILS     Patient status:  Altered mental status    Mallampati score:  III    Paralytics:  Rocuronium      PROCEDURE DETAILS     Preoxygenation:  Nasal cannula    CPR in progress: no      Intubation method:  Oral    Oral intubation technique:  Video-assisted    Laryngoscope blade:  Mac 3    Tube size (mm):  7.5    Tube type:  Cuffed    Number of attempts:  1    Ventilation between attempts: no      Cricoid pressure: no      Tube visualized through cords: yes      PLACEMENT ASSESSMENT     ETT to lip:  24    Tube secured with:  ETT coronel    Breath sounds:  Equal    Placement verification: direct visualization and ETCO2 detector    PROCEDURE   Patient Tolerance:  Patient tolerated the procedure well with no immediate complications                     EKG Interpretation:      Interpreted by Parrish Gastelum MD  Rhythm: normal sinus   Rate: normal  Axis: normal  Ectopy: none  Conduction: normal  ST Segments/ T Waves: No ST-T wave changes  Q Waves: none  Comparison to prior: 4/23/20    Clinical Impression: normal EKG          Critical Care time:  was 30 minutes for this patient excluding procedures.  For management of seizures.       Trauma Summary Disposition     Patient is trauma admission:  ED evaluation    Intent to transfer: 6/12/2020 @ 1530    Spine  CSpine Clearance:CT cervical spine  Full Primary and Secondary survey with appropriate immobilization of spine completed in exam section.     Neuro  GCS at arrival:  Motor 6=Obeys commands   Verbal 4confused   Eye Opening 4=Spontaneous   Total: 14     GCS at disposition: 3 intubated    ED Procedures completed  none    Admitting Consultants:  Surgery consult with Dr. Otto at 7157. Plan: was oK with admit but after consulting with Dr. Saleh  hospitalist it was decided to  transfer secondary to rib fractures*  Consult order placed into Epic:   Yes  Transfer Consultant:  Patient s status reviewed with MD Charity at [facility] at 1510,  who agrees to accept patient in transfer.               Results for orders placed or performed during the hospital encounter of 06/12/20 (from the past 24 hour(s))   CBC with platelets differential   Result Value Ref Range    WBC 15.2 (H) 4.0 - 11.0 10e9/L    RBC Count 3.12 (L) 4.4 - 5.9 10e12/L    Hemoglobin 10.2 (L) 13.3 - 17.7 g/dL    Hematocrit 32.4 (L) 40.0 - 53.0 %     (H) 78 - 100 fl    MCH 32.7 26.5 - 33.0 pg    MCHC 31.5 31.5 - 36.5 g/dL    RDW Dimorphic population - unable to calculate 10.0 - 15.0 %    Platelet Count 201 150 - 450 10e9/L    Diff Method Automated Method     % Neutrophils 78.7 %    % Lymphocytes 5.0 %    % Monocytes 13.9 %    % Eosinophils 0.2 %    % Basophils 0.4 %    % Immature Granulocytes 1.8 %    Nucleated RBCs 0 0 /100    Absolute Neutrophil 11.9 (H) 1.6 - 8.3 10e9/L    Absolute Lymphocytes 0.8 0.8 - 5.3 10e9/L    Absolute Monocytes 2.1 (H) 0.0 - 1.3 10e9/L    Absolute Eosinophils 0.0 0.0 - 0.7 10e9/L    Absolute Basophils 0.1 0.0 - 0.2 10e9/L    Abs Immature Granulocytes 0.3 0 - 0.4 10e9/L    Absolute Nucleated RBC 0.0    Basic metabolic panel   Result Value Ref Range    Sodium 138 133 - 144 mmol/L    Potassium 3.8 3.4 - 5.3 mmol/L    Chloride 106 94 - 109 mmol/L    Carbon Dioxide 25 20 - 32 mmol/L    Anion Gap 7 3 - 14 mmol/L    Glucose 121 (H) 70 - 99 mg/dL    Urea Nitrogen 17 7 - 30 mg/dL    Creatinine 1.39 (H) 0.66 - 1.25 mg/dL    GFR Estimate 54 (L) >60 mL/min/[1.73_m2]    GFR Estimate If Black 63 >60 mL/min/[1.73_m2]    Calcium 9.1 8.5 - 10.1 mg/dL   Hepatic panel   Result Value Ref Range    Bilirubin Direct 1.2 (H) 0.0 - 0.2 mg/dL    Bilirubin Total 1.9 (H) 0.2 - 1.3 mg/dL    Albumin 2.9 (L) 3.4 - 5.0 g/dL    Protein Total 6.9 6.8 - 8.8 g/dL    Alkaline Phosphatase 464 (H) 40 - 150 U/L    ALT 19 0 - 70  U/L    AST 38 0 - 45 U/L   Alcohol ethyl   Result Value Ref Range    Ethanol g/dL <0.01 <0.01 g/dL   INR   Result Value Ref Range    INR 1.47 (H) 0.86 - 1.14   Lipase   Result Value Ref Range    Lipase 192 73 - 393 U/L   Partial thromboplastin time   Result Value Ref Range    PTT 37 22 - 37 sec   Troponin I   Result Value Ref Range    Troponin I ES <0.015 0.000 - 0.045 ug/L   Lactic acid whole blood   Result Value Ref Range    Lactic Acid 1.8 0.7 - 2.0 mmol/L   CT Head w/o Contrast    Narrative    CT SCAN OF THE HEAD WITHOUT CONTRAST   6/12/2020 10:53 AM     HISTORY:  Fall.    TECHNIQUE:  Axial images of the head and coronal reformations without  IV contrast material.  Radiation dose for this scan was reduced using  automated exposure control, adjustment of the mA and/or kV according  to patient size, or iterative reconstruction technique.    COMPARISON: 2/25/2019    FINDINGS: There is some cerebral atrophy. Minimal nonspecific white  matter changes without mass effect are present. There is no evidence  for intracranial hemorrhage, mass effect, acute infarct, or skull  fracture. Visualized paranasal sinuses and mastoid air cells are  clear. Vascular calcifications noted.      Impression    IMPRESSION: Chronic changes. No evidence for intracranial hemorrhage  or new acute process.    ABY WADDELL MD   CT Cervical Spine w/o Contrast    Narrative    CT CERVICAL SPINE WITHOUT CONTRAST   6/12/2020 10:54 AM     HISTORY: Fall.     TECHNIQUE: Axial images of the cervical spine were obtained without  intravenous contrast. Multiplanar reformations were performed.  Radiation dose for this scan was reduced using automated exposure  control, adjustment of the mA and/or kV according to patient size, or  iterative reconstruction technique.     COMPARISON: 2/25/2019.    FINDINGS: Sagittal reconstructions demonstrate minimal chronic  degenerative spondylolisthesis of C2 on C3, otherwise normal posterior  alignment. There is no  evidence for fracture. Multilevel degenerative  disc and facet disease is present with moderate to severe central  canal stenoses at C5-C6 and C6-C7. There is also moderate to severe  bilateral neural foraminal stenoses at several levels. Extensive  vascular calcifications noted.      Impression    IMPRESSION:  1. No evidence for fracture.  2. Multilevel degenerative disc and facet disease again noted.    ABY WADDELL MD   Shoulder XR, 2 view, right    Narrative    SHOULDER TWO VIEWS RIGHT, PELVIS ONE TO TWO VIEWS, RIBS AND CHEST  RIGHT THREE VIEWS June 12, 2020 11:26 AM     HISTORY: Right rib pain, right shoulder pain and hip pain after a  fall.    COMPARISON: AP pelvis 12/5/2019. Two view chest 8/28/2019.      Impression    IMPRESSION:     Right shoulder: Advanced osteoarthritis at the glenohumeral joint.  Vascular calcifications. Multiple rib fractures described below. No  other acute bony abnormality.    Chest/right Ribs: No active cardiopulmonary disease. There are acute  displaced fractures of the posterior right third through seventh ribs  as well as the lateral right seventh rib and anterior lateral right  eighth and ninth ribs. The chest x-ray also shows slightly displaced  fractures of the left posterolateral fourth and fifth ribs. These may  be old and healed but were not definitely present on prior chest  x-rays.    AP pelvis: Interval placement of a left hip arthroplasty with  components in the expected location on the AP view. No acute appearing  bony abnormality. Extensive vascular calcifications.    AKIN DORANTES MD   XR Pelvis 1/2 Views    Narrative    SHOULDER TWO VIEWS RIGHT, PELVIS ONE TO TWO VIEWS, RIBS AND CHEST  RIGHT THREE VIEWS June 12, 2020 11:26 AM     HISTORY: Right rib pain, right shoulder pain and hip pain after a  fall.    COMPARISON: AP pelvis 12/5/2019. Two view chest 8/28/2019.      Impression    IMPRESSION:     Right shoulder: Advanced osteoarthritis at the glenohumeral  joint.  Vascular calcifications. Multiple rib fractures described below. No  other acute bony abnormality.    Chest/right Ribs: No active cardiopulmonary disease. There are acute  displaced fractures of the posterior right third through seventh ribs  as well as the lateral right seventh rib and anterior lateral right  eighth and ninth ribs. The chest x-ray also shows slightly displaced  fractures of the left posterolateral fourth and fifth ribs. These may  be old and healed but were not definitely present on prior chest  x-rays.    AP pelvis: Interval placement of a left hip arthroplasty with  components in the expected location on the AP view. No acute appearing  bony abnormality. Extensive vascular calcifications.    AKIN DORANTES MD   Ribs XR, unilat 3 views + PA chest, right    Narrative    SHOULDER TWO VIEWS RIGHT, PELVIS ONE TO TWO VIEWS, RIBS AND CHEST  RIGHT THREE VIEWS June 12, 2020 11:26 AM     HISTORY: Right rib pain, right shoulder pain and hip pain after a  fall.    COMPARISON: AP pelvis 12/5/2019. Two view chest 8/28/2019.      Impression    IMPRESSION:     Right shoulder: Advanced osteoarthritis at the glenohumeral joint.  Vascular calcifications. Multiple rib fractures described below. No  other acute bony abnormality.    Chest/right Ribs: No active cardiopulmonary disease. There are acute  displaced fractures of the posterior right third through seventh ribs  as well as the lateral right seventh rib and anterior lateral right  eighth and ninth ribs. The chest x-ray also shows slightly displaced  fractures of the left posterolateral fourth and fifth ribs. These may  be old and healed but were not definitely present on prior chest  x-rays.    AP pelvis: Interval placement of a left hip arthroplasty with  components in the expected location on the AP view. No acute appearing  bony abnormality. Extensive vascular calcifications.    AKIN DORANTES MD   CT Chest/Abdomen/Pelvis w Contrast    Narrative     CT CHEST, ABDOMEN, AND PELVIS WITH CONTRAST 6/12/2020 1:33 PM     HISTORY: Fall; multiple rib fractures.    COMPARISON: October 3, 2019    TECHNIQUE: Volumetric helical acquisition of CT images from the lung  apices through the symphysis pubis after the administration of 90 mL  Isovue 370 intravenous contrast. Radiation dose for this scan was  reduced using automated exposure control, adjustment of the mA and/or  kV according to patient size, or iterative reconstruction technique.    FINDINGS:   Chest: Fractures of the right third, fourth, fifth, sixth, seventh,  and eighth ribs on the right noted. Multiple rib fractures have  posterior and anterolateral components. No definite pneumothorax.  Trace peripheral atelectasis and/or fibrosis. Trace atelectasis and/or  fibrosis on the right. Left lung clear. No definite left-sided  fractures. Extensive gastroesophageal varices.    Abdomen and pelvis: Cirrhotic liver morphology. Multiple portal  systemic collaterals. No definite splenomegaly. Cholelithiasis without  cholecystitis. Adrenal glands, pancreas, and spleen are unremarkable.  Some atrophy and a large calcification seen in the left kidney  measuring up to 1.5 cm. Right kidney unremarkable. No hydronephrosis.  There are moderate atherosclerotic changes of the visualized aorta and  its branches. There is no evidence of aortic dissection or aneurysm.  There are no dilated loops of small intestine or large bowel to  suggest ileus or obstruction.    No frankly destructive bony lesions.      Impression    IMPRESSION:  1. Right-sided third-eighth rib fractures, multiple ribs have anterior  and posterior components.  2. Otherwise no acute process demonstrated in the abdomen and pelvis.    MAUREEN BERNABE MD   Occult blood stool POCT   Result Value Ref Range    Occult Blood negative neg    Internal QC OK No     Test Card Lot Number [id number]    XR Chest Port 1 View    Narrative    CHEST ONE VIEW PORTABLE   6/12/2020  4:49 PM     HISTORY: Post intubation.    COMPARISON: None.      Impression    IMPRESSION: Tip of the endotracheal tube is 5.5 cm above the masoud.  Nasogastric tube extends below the left hemidiaphragm. No pneumothorax  or pleural effusion. Mild interstitial abnormalities without gross  airspace consolidation.    MAGGY KAMINSKI MD       Medications   sodium chloride 0.9 % 1,000 mL with Infuvite Adult 10 mL, thiamine 100 mg, folic acid 1 mg infusion (has no administration in time range)   vitamin B1 (THIAMINE) tablet 100 mg (has no administration in time range)   folic acid (FOLVITE) tablet 1 mg (has no administration in time range)   multivitamin w/minerals (THERA-VIT-M) tablet 1 tablet (has no administration in time range)   cloNIDine (CATAPRES) tablet 0.1 mg (0.1 mg Oral Not Given 6/12/20 1714)   sodium chloride 0.9% infusion (has no administration in time range)   LORazepam (ATIVAN) tablet 1-2 mg ( Oral See Alternative 6/12/20 1552)     Or   LORazepam (ATIVAN) injection 1-2 mg (4 mg Intravenous Given 6/12/20 1552)   dexmedetomidine (PRECEDEX) 4 mcg/mL in sodium chloride 0.9 % 100 mL infusion (0 mcg/kg/hr × 83.9 kg Intravenous Stopped 6/12/20 1645)   LORazepam (ATIVAN) 2 MG/ML injection (has no administration in time range)   LORazepam (ATIVAN) 2 MG/ML injection (has no administration in time range)   rocuronium injection 50 mg (has no administration in time range)   propofol (DIPRIVAN) infusion (5 mcg/kg/min × 83.9 kg Intravenous New Bag 6/12/20 1651)   LORazepam (ATIVAN) injection 4 mg (has no administration in time range)   0.9% sodium chloride BOLUS (0 mLs Intravenous Stopped 6/12/20 1230)   iopamidol (ISOVUE-370) solution 90 mL (90 mLs Intravenous Given 6/12/20 1319)   sodium chloride 0.9 % bag 500mL for CT scan flush use (62 mLs Intravenous Given 6/12/20 1319)   ketorolac (TORADOL) injection 15 mg (15 mg Intravenous Given 6/12/20 1503)   LORazepam (ATIVAN) injection 0.5 mg (0.5 mg Intravenous Given 6/12/20  1502)   ondansetron (ZOFRAN) injection 4 mg (4 mg Intravenous Given 6/12/20 1502)   etomidate (AMIDATE) injection 20 mg (20 mg Intravenous Given 6/12/20 1619)   succinylcholine (ANECTINE) injection 100 mg (100 mg Intravenous Given 6/12/20 1620)       Assessments & Plan (with Medical Decision Making) records were reviewed.  Trauma evaluation was considered but it was decided did not meet criteria and emergency department evaluation was conducted.  IV fluids were given to the patient labs were obtained.  CT scan of the head and neck along with chest x-ray with rib detail shoulder x-ray and pelvis x-ray were obtained.  CT scan of the head with no acute abnormality and CT scan of the cervical spine with neck degeneration but no other abnormality.  Chest x-ray revealed numerous mildly displaced fractures both posterior and anterior on the right.  Pelvic film without acute abnormality.  There is no pneumothorax or contusion noted on chest x-ray due to the patient's multiple rib fractures I did decide to proceed with a CT of the chest abdomen pelvis.  He was looking a bit shaky at this time and therefore I did give him a dose of Ativan and some Toradol for pain.  CT scan of the chest abdomen pelvis revealed multiple rib fractures on the posterior right and right lateral region.  3 through 7 posteriorly 7 and 8 laterally.  I discussed this case with both our surgeon Dr. BASSETT and Dr. Saleh with hospitalist service.  Due to the multiple rib fractures present it was decided patient needed to be transferred to a trauma center.  I did discuss the case with Dr. Nash with [facility] ED and she was in agreement with transfer.  the patient patient had required further Ativan and shortly afterwards spiked a temperature and began seizing.  Blood cultures were drawn.  Transfer was held. He seized  several times and therefore even with added Ativan and is concerned of his rib fractures is decided to intubate the patient.  This was done  under direct visualization a 7-1/2 tube was placed.  Etomidate succinylcholine and rocuronium were used during intubation patient was placed on a propofol drip sedation.  ET tube was slightly high in the masoud and was advanced.  The emergency department at [facility] was notified of the intubation.  Patient had been catheterized and urine had been sent off . patient had been transferred before I saw that the urine was positive for a probable UTI.  And the possibility of urosepsis.  Urine culture was added.     I have reviewed the nursing notes.    I have reviewed the findings, diagnosis, plan and need for follow up with the patient.       New Prescriptions    No medications on file       Final diagnoses:   Fall, initial encounter   Altered mental status, unspecified altered mental status type   Closed fracture of multiple ribs of right side, initial encounter   Seizures (H) - probable alcohol withdrawal   Acute urinary tract infection - possible urosepsis       6/12/2020   [facility] EMERGENCY DEPARTMENT     Parrish Gastelum MD  06/15/20 1013

## 2020-06-12 NOTE — ANESTHESIA PREPROCEDURE EVALUATION
Anesthesia Pre-Procedure Evaluation    Patient: Abhijeet Mccauley   MRN: [id number] : 1960          Preoperative Diagnosis: * No pre-op diagnosis entered *    * No procedures listed *    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)     s/p chemo, no bone marrow transplant     Chronic obstructive pulmonary disease 2018     COPD (chronic obstructive pulmonary disease) (H)      History of pulmonary embolus (PE)      Hypertension      PUD (peptic ulcer disease)      Tobacco dependence      Ulcerative colitis (H)      Past Surgical History:   Procedure Laterality Date     ARTHROPLASTY HIP Left 2020    Procedure: ARTHROPLASTY, HIP, TOTAL;  Surgeon: Carlton Jones MD;  Location: [facility] OR     AS TOTAL KNEE ARTHROPLASTY Left      BONE MARROW BIOPSY, BONE SPECIMEN, NEEDLE/TROCAR N/A 2018    Procedure: BIOPSY BONE MARROW;  Bone Marrow Biopsy;  Surgeon: Demar Sapp MD;  Location: [facility] GI     ESOPHAGOSCOPY, GASTROSCOPY, DUODENOSCOPY (EGD), COMBINED N/A 2018    Procedure: COMBINED ESOPHAGOSCOPY, GASTROSCOPY, DUODENOSCOPY (EGD), BIOPSY SINGLE OR MULTIPLE;  gastroscopy with biopsies;  Surgeon: Raz Cobb MD;  Location: [facility] GI     ESOPHAGOSCOPY, GASTROSCOPY, DUODENOSCOPY (EGD), COMBINED N/A 3/18/2018    Procedure: COMBINED ESOPHAGOSCOPY, GASTROSCOPY, DUODENOSCOPY (EGD);;  Surgeon: Raz Cobb MD;  Location: [facility] GI     ESOPHAGOSCOPY, GASTROSCOPY, DUODENOSCOPY (EGD), COMBINED N/A 2020    Procedure: ESOPHAGOGASTRODUODENOSCOPY, WITH BIOPSY;  Surgeon: Chente Mclaughlin DO;  Location: [facility] GI     LACERATION REPAIR Right     Right leg     PHACOEMULSIFICATION WITH STANDARD INTRAOCULAR LENS IMPLANT Left 2019    Procedure: cataract removal with implant.;  Surgeon: Adrian Oliveira MD;  Location: [facility] OR     PHACOEMULSIFICATION  WITH STANDARD INTRAOCULAR LENS IMPLANT Right 7/29/2019    Procedure: Cataract removal with implant.;  Surgeon: Adrian Oliveira MD;  Location: [facility] OR       Anesthesia Evaluation     . Pt has had prior anesthetic. Type: General and Regional           ROS/MED HX    ENT/Pulmonary:     (+)other ENT- [place] [employer] [place], tobacco use, Current use Intermittent asthma COPD, , . Other pulmonary disease Hx PE; Generalized weakness.    Neurologic:     (+)neuropathy     Cardiovascular:     (+) Dyslipidemia, hypertension----. : . . . :. .       METS/Exercise Tolerance:     Hematologic:     (+) History of blood clots Anemia, -      Musculoskeletal:   (+) arthritis,  other musculoskeletal- Lumbago w/ bilat sciatica      GI/Hepatic:     (+) liver disease, Other GI/Hepatic ETOH w/ DTs; Ulcerative colitis; Cirrhosis; Hx GI blees      Renal/Genitourinary:     (+) chronic renal disease, type: CRI,       Endo:         Psychiatric:         Infectious Disease:         Malignancy:   (+) Malignancy History of Lymphoma/Leukemia  Lymph CA Remission status post,         Other:                                 Lab Results   Component Value Date    WBC 15.2 (H) 06/12/2020    HGB 10.2 (L) 06/12/2020    HCT 32.4 (L) 06/12/2020     06/12/2020    CRP 33.8 (H) 03/15/2019    SED 73 (H) 03/15/2019     06/12/2020    POTASSIUM 3.8 06/12/2020    CHLORIDE 106 06/12/2020    CO2 25 06/12/2020    BUN 17 06/12/2020    CR 1.39 (H) 06/12/2020     (H) 06/12/2020    BEE 9.1 06/12/2020    PHOS 5.1 (H) 12/22/2008    MAG 2.0 03/05/2019    ALBUMIN 2.9 (L) 06/12/2020    PROTTOTAL 6.9 06/12/2020    ALT 19 06/12/2020    AST 38 06/12/2020    ALKPHOS 464 (H) 06/12/2020    BILITOTAL 1.9 (H) 06/12/2020    LIPASE 192 06/12/2020    STEW 34 05/31/2019    PTT 37 06/12/2020    INR 1.47 (H) 06/12/2020    FIBR 323 12/10/2014    TSH 0.94 12/05/2018       Preop Vitals  BP Readings from Last 3 Encounters:   06/12/20 (!) 143/68   05/30/20 120/50   05/22/20 131/60    Pulse  "Readings from Last 3 Encounters:   06/12/20 96   05/30/20 67   05/22/20 68      Resp Readings from Last 3 Encounters:   06/12/20 25   05/30/20 16   04/23/20 18    SpO2 Readings from Last 3 Encounters:   06/12/20 96%   05/30/20 95%   05/12/20 96%      Temp Readings from Last 1 Encounters:   06/12/20 38.2  C (100.8  F) (Rectal)    Ht Readings from Last 1 Encounters:   06/12/20 1.676 m (5' 6\")      Wt Readings from Last 1 Encounters:   06/12/20 83.9 kg (185 lb)    Estimated body mass index is 29.86 kg/m  as calculated from the following:    Height as of this encounter: 1.676 m (5' 6\").    Weight as of this encounter: 83.9 kg (185 lb).       Anesthesia Plan      History & Physical Review      ASA Status:  3 emergent.        Plan for General            Postoperative Care      Consents                 LOPEZ Hernandez CRNA  "

## 2020-06-12 NOTE — LETTER
Transition Communication Hand-off for Care Transitions to Next Level of Care Provider    Name: Abhijeet Mccauley  : 1960  MRN #: [id number]  Primary Care Provider: Chidi Valenzuela     Primary Clinic: [address] [facility] [address]     Reason for Hospitalization:  Alcohol withdrawal syndrome with complication (H) [F10.239]  Admit Date/Time: 2020  8:14 PM  Discharge Date: 2020  Payor Source: Payor: [facility] / Plan: [employer] MA ONLY / Product Type: HMO /       Reason for Communication Hand-off Referral:  Notification of the  discharge plan.    Discharge Plan:    Social Work Services Discharge Note      Patient Name:  Abhijeet Mccauley     Anticipated Discharge Date:  2020     Discharge Disposition:   [facility] ([phone])     Following MD:  Facility  Assignment     Pre-Admission Screening (PAS) online form has been completed.  The Level of Care (LOC) is:  Determined  Confirmation Code is:  [id number].  Patient/caregiver informed of referral to [facility] Line for Pre-Admission Screening for skilled nursing facility (SNF) placement and to expect a phone call post discharge from SNF.     Additional Services/Equipment Arranged:  SW confirmed [facility] for [facility]  SW confirmed acceptance for admit to [facility] with Admissions for [facility] SNF's ([place] [phone]).  SW arranged for [facility] ([place] [phone]) to provide w/c transport at 2pm.       Patient / Family response to discharge plan:  Pt and sister (Rowena) voice understanding of the discharge plan and agreement with the discharge plan.     Persons notified of above discharge plan:  Pt, sister (Rowena), 6A nursing and Dr. Alejo     Staff Discharge Instructions:  Please fax discharge orders and signed hard scripts for any controlled substances (SW will complete this task).  Please print a packet and send with patient.      CTS Handoff completed:  YES     Medicare  Notice of Rights provided to the patient/family:  NO, pt Admission Facesheet, pt is not on Medicare.     RUDY Elias  Social Work, 6A  Phone:  [phone]  Pager:  [phone]  6/25/2020

## 2020-06-13 ENCOUNTER — APPOINTMENT (OUTPATIENT)
Dept: GENERAL RADIOLOGY | Facility: CLINIC | Age: 60
End: 2020-06-13
Attending: ANESTHESIOLOGY
Payer: COMMERCIAL

## 2020-06-13 ENCOUNTER — ANESTHESIA EVENT (OUTPATIENT)
Dept: SURGERY | Facility: CLINIC | Age: 60
End: 2020-06-13
Payer: COMMERCIAL

## 2020-06-13 ENCOUNTER — APPOINTMENT (OUTPATIENT)
Dept: INTERVENTIONAL RADIOLOGY/VASCULAR | Facility: CLINIC | Age: 60
End: 2020-06-13
Payer: COMMERCIAL

## 2020-06-13 ENCOUNTER — ANESTHESIA (OUTPATIENT)
Dept: SURGERY | Facility: CLINIC | Age: 60
End: 2020-06-13
Payer: COMMERCIAL

## 2020-06-13 ENCOUNTER — APPOINTMENT (OUTPATIENT)
Dept: GENERAL RADIOLOGY | Facility: CLINIC | Age: 60
End: 2020-06-13
Attending: UROLOGY
Payer: COMMERCIAL

## 2020-06-13 ENCOUNTER — DOCUMENTATION ONLY (OUTPATIENT)
Dept: OTHER | Facility: CLINIC | Age: 60
End: 2020-06-13

## 2020-06-13 LAB
ALBUMIN SERPL-MCNC: 2.3 G/DL (ref 3.4–5)
ALBUMIN UR-MCNC: 100 MG/DL
ALP SERPL-CCNC: 353 U/L (ref 40–150)
ALT SERPL W P-5'-P-CCNC: 14 U/L (ref 0–70)
ANION GAP SERPL CALCULATED.3IONS-SCNC: 10 MMOL/L (ref 3–14)
ANION GAP SERPL CALCULATED.3IONS-SCNC: 9 MMOL/L (ref 3–14)
APPEARANCE UR: ABNORMAL
AST SERPL W P-5'-P-CCNC: 30 U/L (ref 0–45)
BASE DEFICIT BLDA-SCNC: 11.7 MMOL/L
BASE DEFICIT BLDA-SCNC: 12.5 MMOL/L
BILIRUB SERPL-MCNC: 1.7 MG/DL (ref 0.2–1.3)
BILIRUB UR QL STRIP: NEGATIVE
BUN SERPL-MCNC: 16 MG/DL (ref 7–30)
BUN SERPL-MCNC: 18 MG/DL (ref 7–30)
CA-I BLD-MCNC: 4.6 MG/DL (ref 4.4–5.2)
CALCIUM SERPL-MCNC: 7.4 MG/DL (ref 8.5–10.1)
CALCIUM SERPL-MCNC: 7.7 MG/DL (ref 8.5–10.1)
CHLORIDE SERPL-SCNC: 114 MMOL/L (ref 94–109)
CHLORIDE SERPL-SCNC: 115 MMOL/L (ref 94–109)
CO2 SERPL-SCNC: 16 MMOL/L (ref 20–32)
CO2 SERPL-SCNC: 18 MMOL/L (ref 20–32)
COLOR UR AUTO: ABNORMAL
CREAT SERPL-MCNC: 1.44 MG/DL (ref 0.66–1.25)
CREAT SERPL-MCNC: 1.93 MG/DL (ref 0.66–1.25)
ERYTHROCYTE [DISTWIDTH] IN BLOOD BY AUTOMATED COUNT: ABNORMAL % (ref 10–15)
ERYTHROCYTE [DISTWIDTH] IN BLOOD BY AUTOMATED COUNT: ABNORMAL % (ref 10–15)
GFR SERPL CREATININE-BSD FRML MDRD: 36 ML/MIN/{1.73_M2}
GFR SERPL CREATININE-BSD FRML MDRD: 52 ML/MIN/{1.73_M2}
GLUCOSE BLD-MCNC: 84 MG/DL (ref 70–99)
GLUCOSE BLDC GLUCOMTR-MCNC: 103 MG/DL (ref 70–99)
GLUCOSE BLDC GLUCOMTR-MCNC: 120 MG/DL (ref 70–99)
GLUCOSE BLDC GLUCOMTR-MCNC: 135 MG/DL (ref 70–99)
GLUCOSE BLDC GLUCOMTR-MCNC: 83 MG/DL (ref 70–99)
GLUCOSE BLDC GLUCOMTR-MCNC: 83 MG/DL (ref 70–99)
GLUCOSE SERPL-MCNC: 134 MG/DL (ref 70–99)
GLUCOSE SERPL-MCNC: 79 MG/DL (ref 70–99)
GLUCOSE UR STRIP-MCNC: NEGATIVE MG/DL
GRAM STN SPEC: NORMAL
GRAM STN SPEC: NORMAL
HCO3 BLD-SCNC: 14 MMOL/L (ref 21–28)
HCO3 BLD-SCNC: 16 MMOL/L (ref 21–28)
HCT VFR BLD AUTO: 28.6 % (ref 40–53)
HCT VFR BLD AUTO: 28.9 % (ref 40–53)
HGB BLD-MCNC: 8.6 G/DL (ref 13.3–17.7)
HGB BLD-MCNC: 8.7 G/DL (ref 13.3–17.7)
HGB BLD-MCNC: 9.8 G/DL (ref 13.3–17.7)
HGB UR QL STRIP: ABNORMAL
KETONES UR STRIP-MCNC: NEGATIVE MG/DL
LACTATE BLD-SCNC: 2.6 MMOL/L (ref 0.7–2)
LACTATE BLD-SCNC: 2.9 MMOL/L (ref 0.7–2)
LACTATE BLD-SCNC: 4 MMOL/L (ref 0.7–2)
LEUKOCYTE ESTERASE UR QL STRIP: ABNORMAL
MAGNESIUM SERPL-MCNC: 1.6 MG/DL (ref 1.6–2.3)
MAGNESIUM SERPL-MCNC: 2.5 MG/DL (ref 1.6–2.3)
MCH RBC QN AUTO: 32.3 PG (ref 26.5–33)
MCH RBC QN AUTO: 32.5 PG (ref 26.5–33)
MCHC RBC AUTO-ENTMCNC: 30.1 G/DL (ref 31.5–36.5)
MCHC RBC AUTO-ENTMCNC: 30.1 G/DL (ref 31.5–36.5)
MCV RBC AUTO: 107 FL (ref 78–100)
MCV RBC AUTO: 108 FL (ref 78–100)
NITRATE UR QL: POSITIVE
O2/TOTAL GAS SETTING VFR VENT: 100 %
O2/TOTAL GAS SETTING VFR VENT: 95 %
PCO2 BLD: 31 MM HG (ref 35–45)
PCO2 BLD: 42 MM HG (ref 35–45)
PH BLD: 7.18 PH (ref 7.35–7.45)
PH BLD: 7.25 PH (ref 7.35–7.45)
PH UR STRIP: 7.5 PH (ref 5–7)
PHOSPHATE SERPL-MCNC: 1.7 MG/DL (ref 2.5–4.5)
PHOSPHATE SERPL-MCNC: 2.4 MG/DL (ref 2.5–4.5)
PLATELET # BLD AUTO: 109 10E9/L (ref 150–450)
PLATELET # BLD AUTO: 129 10E9/L (ref 150–450)
PO2 BLD: 71 MM HG (ref 80–105)
PO2 BLD: 93 MM HG (ref 80–105)
POTASSIUM BLD-SCNC: 3.2 MMOL/L (ref 3.4–5.3)
POTASSIUM SERPL-SCNC: 3.3 MMOL/L (ref 3.4–5.3)
POTASSIUM SERPL-SCNC: 3.4 MMOL/L (ref 3.4–5.3)
PROT SERPL-MCNC: 5.3 G/DL (ref 6.8–8.8)
RBC # BLD AUTO: 2.65 10E12/L (ref 4.4–5.9)
RBC # BLD AUTO: 2.69 10E12/L (ref 4.4–5.9)
RBC #/AREA URNS AUTO: 147 /HPF (ref 0–2)
SODIUM BLD-SCNC: 139 MMOL/L (ref 133–144)
SODIUM SERPL-SCNC: 141 MMOL/L (ref 133–144)
SODIUM SERPL-SCNC: 141 MMOL/L (ref 133–144)
SOURCE: ABNORMAL
SP GR UR STRIP: 1.01 (ref 1–1.03)
SPECIMEN SOURCE: NORMAL
UROBILINOGEN UR STRIP-MCNC: NORMAL MG/DL (ref 0–2)
WBC # BLD AUTO: 19.1 10E9/L (ref 4–11)
WBC # BLD AUTO: 25.5 10E9/L (ref 4–11)
WBC #/AREA URNS AUTO: >182 /HPF (ref 0–5)
WBC CLUMPS #/AREA URNS HPF: PRESENT /HPF

## 2020-06-13 PROCEDURE — 00000146 ZZHCL STATISTIC GLUCOSE BY METER IP

## 2020-06-13 PROCEDURE — 84100 ASSAY OF PHOSPHORUS: CPT | Performed by: STUDENT IN AN ORGANIZED HEALTH CARE EDUCATION/TRAINING PROGRAM

## 2020-06-13 PROCEDURE — C9113 INJ PANTOPRAZOLE SODIUM, VIA: HCPCS | Performed by: STUDENT IN AN ORGANIZED HEALTH CARE EDUCATION/TRAINING PROGRAM

## 2020-06-13 PROCEDURE — 25000128 H RX IP 250 OP 636: Performed by: NURSE ANESTHETIST, CERTIFIED REGISTERED

## 2020-06-13 PROCEDURE — 80053 COMPREHEN METABOLIC PANEL: CPT | Performed by: STUDENT IN AN ORGANIZED HEALTH CARE EDUCATION/TRAINING PROGRAM

## 2020-06-13 PROCEDURE — 82947 ASSAY GLUCOSE BLOOD QUANT: CPT | Performed by: SURGERY

## 2020-06-13 PROCEDURE — C2617 STENT, NON-COR, TEM W/O DEL: HCPCS | Performed by: UROLOGY

## 2020-06-13 PROCEDURE — 36415 COLL VENOUS BLD VENIPUNCTURE: CPT | Performed by: STUDENT IN AN ORGANIZED HEALTH CARE EDUCATION/TRAINING PROGRAM

## 2020-06-13 PROCEDURE — 37000009 ZZH ANESTHESIA TECHNICAL FEE, EACH ADDTL 15 MIN: Performed by: RADIOLOGY

## 2020-06-13 PROCEDURE — 0T778DZ DILATION OF LEFT URETER WITH INTRALUMINAL DEVICE, VIA NATURAL OR ARTIFICIAL OPENING ENDOSCOPIC: ICD-10-PCS | Performed by: UROLOGY

## 2020-06-13 PROCEDURE — 25000128 H RX IP 250 OP 636: Performed by: SURGERY

## 2020-06-13 PROCEDURE — 25000125 ZZHC RX 250

## 2020-06-13 PROCEDURE — 87088 URINE BACTERIA CULTURE: CPT | Performed by: STUDENT IN AN ORGANIZED HEALTH CARE EDUCATION/TRAINING PROGRAM

## 2020-06-13 PROCEDURE — 25800030 ZZH RX IP 258 OP 636: Performed by: NURSE ANESTHETIST, CERTIFIED REGISTERED

## 2020-06-13 PROCEDURE — C1769 GUIDE WIRE: HCPCS

## 2020-06-13 PROCEDURE — 83735 ASSAY OF MAGNESIUM: CPT | Performed by: STUDENT IN AN ORGANIZED HEALTH CARE EDUCATION/TRAINING PROGRAM

## 2020-06-13 PROCEDURE — 37000009 ZZH ANESTHESIA TECHNICAL FEE, EACH ADDTL 15 MIN: Performed by: UROLOGY

## 2020-06-13 PROCEDURE — 36000053 ZZH SURGERY LEVEL 2 EA 15 ADDTL MIN - UMMC: Performed by: UROLOGY

## 2020-06-13 PROCEDURE — 82803 BLOOD GASES ANY COMBINATION: CPT | Performed by: ANESTHESIOLOGY

## 2020-06-13 PROCEDURE — 25800030 ZZH RX IP 258 OP 636: Performed by: STUDENT IN AN ORGANIZED HEALTH CARE EDUCATION/TRAINING PROGRAM

## 2020-06-13 PROCEDURE — 25000128 H RX IP 250 OP 636: Performed by: STUDENT IN AN ORGANIZED HEALTH CARE EDUCATION/TRAINING PROGRAM

## 2020-06-13 PROCEDURE — 25000125 ZZHC RX 250: Performed by: STUDENT IN AN ORGANIZED HEALTH CARE EDUCATION/TRAINING PROGRAM

## 2020-06-13 PROCEDURE — 85027 COMPLETE CBC AUTOMATED: CPT | Performed by: STUDENT IN AN ORGANIZED HEALTH CARE EDUCATION/TRAINING PROGRAM

## 2020-06-13 PROCEDURE — 25800030 ZZH RX IP 258 OP 636: Performed by: SURGERY

## 2020-06-13 PROCEDURE — BT1F1ZZ FLUOROSCOPY OF LEFT KIDNEY, URETER AND BLADDER USING LOW OSMOLAR CONTRAST: ICD-10-PCS | Performed by: UROLOGY

## 2020-06-13 PROCEDURE — 94003 VENT MGMT INPAT SUBQ DAY: CPT

## 2020-06-13 PROCEDURE — 40000275 ZZH STATISTIC RCP TIME EA 10 MIN

## 2020-06-13 PROCEDURE — 27210794 ZZH OR GENERAL SUPPLY STERILE: Performed by: RADIOLOGY

## 2020-06-13 PROCEDURE — 25000125 ZZHC RX 250: Performed by: NURSE ANESTHETIST, CERTIFIED REGISTERED

## 2020-06-13 PROCEDURE — 25000566 ZZH SEVOFLURANE, EA 15 MIN: Performed by: UROLOGY

## 2020-06-13 PROCEDURE — 83605 ASSAY OF LACTIC ACID: CPT | Performed by: ANESTHESIOLOGY

## 2020-06-13 PROCEDURE — 0TJ93ZZ INSPECTION OF URETER, PERCUTANEOUS APPROACH: ICD-10-PCS | Performed by: RADIOLOGY

## 2020-06-13 PROCEDURE — 25000132 ZZH RX MED GY IP 250 OP 250 PS 637: Performed by: STUDENT IN AN ORGANIZED HEALTH CARE EDUCATION/TRAINING PROGRAM

## 2020-06-13 PROCEDURE — 40000556 ZZH STATISTIC PERIPHERAL IV START W US GUIDANCE

## 2020-06-13 PROCEDURE — 27210794 ZZH OR GENERAL SUPPLY STERILE: Performed by: UROLOGY

## 2020-06-13 PROCEDURE — 83605 ASSAY OF LACTIC ACID: CPT | Performed by: SURGERY

## 2020-06-13 PROCEDURE — 82330 ASSAY OF CALCIUM: CPT | Performed by: SURGERY

## 2020-06-13 PROCEDURE — 84132 ASSAY OF SERUM POTASSIUM: CPT | Performed by: SURGERY

## 2020-06-13 PROCEDURE — 25500064 ZZH RX 255 OP 636: Performed by: SURGERY

## 2020-06-13 PROCEDURE — C1887 CATHETER, GUIDING: HCPCS | Performed by: UROLOGY

## 2020-06-13 PROCEDURE — 40000986 XR CHEST PORT 1 VW

## 2020-06-13 PROCEDURE — 37000008 ZZH ANESTHESIA TECHNICAL FEE, 1ST 30 MIN: Performed by: UROLOGY

## 2020-06-13 PROCEDURE — C1769 GUIDE WIRE: HCPCS | Performed by: UROLOGY

## 2020-06-13 PROCEDURE — 25800030 ZZH RX IP 258 OP 636

## 2020-06-13 PROCEDURE — 25500064 ZZH RX 255 OP 636: Performed by: UROLOGY

## 2020-06-13 PROCEDURE — 87205 SMEAR GRAM STAIN: CPT | Performed by: STUDENT IN AN ORGANIZED HEALTH CARE EDUCATION/TRAINING PROGRAM

## 2020-06-13 PROCEDURE — 81001 URINALYSIS AUTO W/SCOPE: CPT | Performed by: STUDENT IN AN ORGANIZED HEALTH CARE EDUCATION/TRAINING PROGRAM

## 2020-06-13 PROCEDURE — 40000277 XR SURGERY CARM FLUORO LESS THAN 5 MIN W STILLS

## 2020-06-13 PROCEDURE — 36000055 ZZH SURGERY LEVEL 2 W FLUORO 1ST 30 MIN - UMMC: Performed by: UROLOGY

## 2020-06-13 PROCEDURE — 87086 URINE CULTURE/COLONY COUNT: CPT | Performed by: STUDENT IN AN ORGANIZED HEALTH CARE EDUCATION/TRAINING PROGRAM

## 2020-06-13 PROCEDURE — 80048 BASIC METABOLIC PNL TOTAL CA: CPT | Performed by: ANESTHESIOLOGY

## 2020-06-13 PROCEDURE — 27210888 ZZH ACCESSORY CR7

## 2020-06-13 PROCEDURE — 87186 SC STD MICRODIL/AGAR DIL: CPT | Performed by: STUDENT IN AN ORGANIZED HEALTH CARE EDUCATION/TRAINING PROGRAM

## 2020-06-13 PROCEDURE — 36556 INSERT NON-TUNNEL CV CATH: CPT | Mod: GC | Performed by: ANESTHESIOLOGY

## 2020-06-13 PROCEDURE — 27210912 ZZH NEEDLE CR8

## 2020-06-13 PROCEDURE — 25000566 ZZH SEVOFLURANE, EA 15 MIN: Performed by: RADIOLOGY

## 2020-06-13 PROCEDURE — 85027 COMPLETE CBC AUTOMATED: CPT | Performed by: ANESTHESIOLOGY

## 2020-06-13 PROCEDURE — 82803 BLOOD GASES ANY COMBINATION: CPT | Performed by: SURGERY

## 2020-06-13 PROCEDURE — 74150 CT ABDOMEN W/O CONTRAST: CPT

## 2020-06-13 PROCEDURE — 50432 PLMT NEPHROSTOMY CATHETER: CPT | Mod: 74

## 2020-06-13 PROCEDURE — 37000008 ZZH ANESTHESIA TECHNICAL FEE, 1ST 30 MIN: Performed by: RADIOLOGY

## 2020-06-13 PROCEDURE — 27210732 ZZH ACCESSORY CR1

## 2020-06-13 PROCEDURE — 20000004 ZZH R&B ICU UMMC

## 2020-06-13 PROCEDURE — 84295 ASSAY OF SERUM SODIUM: CPT | Performed by: SURGERY

## 2020-06-13 DEVICE — STENT URETERAL PERCUFLEX PLUS 07FRX22/30CM M0061851570
Type: IMPLANTABLE DEVICE | Site: URETER | Status: NON-FUNCTIONAL
Removed: 2020-12-04

## 2020-06-13 RX ORDER — HYDROMORPHONE HCL/0.9% NACL/PF 0.2MG/0.2
.2-.5 SYRINGE (ML) INTRAVENOUS
Status: DISCONTINUED | OUTPATIENT
Start: 2020-06-13 | End: 2020-06-15

## 2020-06-13 RX ORDER — FLUMAZENIL 0.1 MG/ML
0.2 INJECTION, SOLUTION INTRAVENOUS
Status: DISCONTINUED | OUTPATIENT
Start: 2020-06-13 | End: 2020-06-13

## 2020-06-13 RX ORDER — CEFTRIAXONE 1 G/1
1 INJECTION, POWDER, FOR SOLUTION INTRAMUSCULAR; INTRAVENOUS EVERY 24 HOURS
Status: DISCONTINUED | OUTPATIENT
Start: 2020-06-13 | End: 2020-06-13

## 2020-06-13 RX ORDER — LORAZEPAM 1 MG/1
1-2 TABLET ORAL EVERY 30 MIN PRN
Status: DISCONTINUED | OUTPATIENT
Start: 2020-06-13 | End: 2020-06-14

## 2020-06-13 RX ORDER — SODIUM CHLORIDE, SODIUM LACTATE, POTASSIUM CHLORIDE, CALCIUM CHLORIDE 600; 310; 30; 20 MG/100ML; MG/100ML; MG/100ML; MG/100ML
INJECTION, SOLUTION INTRAVENOUS CONTINUOUS PRN
Status: DISCONTINUED | OUTPATIENT
Start: 2020-06-13 | End: 2020-06-13

## 2020-06-13 RX ORDER — HEPARIN SODIUM 5000 [USP'U]/.5ML
5000 INJECTION, SOLUTION INTRAVENOUS; SUBCUTANEOUS EVERY 8 HOURS
Status: DISCONTINUED | OUTPATIENT
Start: 2020-06-13 | End: 2020-06-24

## 2020-06-13 RX ORDER — BACLOFEN 10 MG/1
10 TABLET ORAL 3 TIMES DAILY
Status: DISCONTINUED | OUTPATIENT
Start: 2020-06-13 | End: 2020-06-15

## 2020-06-13 RX ORDER — PIPERACILLIN SODIUM, TAZOBACTAM SODIUM 3; .375 G/15ML; G/15ML
3.38 INJECTION, POWDER, LYOPHILIZED, FOR SOLUTION INTRAVENOUS EVERY 6 HOURS
Status: DISCONTINUED | OUTPATIENT
Start: 2020-06-13 | End: 2020-06-13

## 2020-06-13 RX ORDER — METOPROLOL TARTRATE 1 MG/ML
5 INJECTION, SOLUTION INTRAVENOUS EVERY 6 HOURS PRN
Status: DISCONTINUED | OUTPATIENT
Start: 2020-06-13 | End: 2020-06-13

## 2020-06-13 RX ORDER — GABAPENTIN 300 MG/1
300 CAPSULE ORAL 3 TIMES DAILY
Status: DISCONTINUED | OUTPATIENT
Start: 2020-06-13 | End: 2020-06-13

## 2020-06-13 RX ORDER — MEROPENEM 1 G/1
1 INJECTION, POWDER, FOR SOLUTION INTRAVENOUS EVERY 8 HOURS
Status: DISCONTINUED | OUTPATIENT
Start: 2020-06-13 | End: 2020-06-14

## 2020-06-13 RX ORDER — SODIUM CHLORIDE, SODIUM LACTATE, POTASSIUM CHLORIDE, CALCIUM CHLORIDE 600; 310; 30; 20 MG/100ML; MG/100ML; MG/100ML; MG/100ML
INJECTION, SOLUTION INTRAVENOUS
Status: COMPLETED
Start: 2020-06-13 | End: 2020-06-13

## 2020-06-13 RX ORDER — LORAZEPAM 2 MG/ML
1 INJECTION INTRAMUSCULAR EVERY 6 HOURS
Status: DISCONTINUED | OUTPATIENT
Start: 2020-06-13 | End: 2020-06-13

## 2020-06-13 RX ORDER — ACETAMINOPHEN 325 MG/1
975 TABLET ORAL EVERY 8 HOURS
Status: DISCONTINUED | OUTPATIENT
Start: 2020-06-13 | End: 2020-06-14

## 2020-06-13 RX ORDER — LORAZEPAM 2 MG/ML
1-2 INJECTION INTRAMUSCULAR EVERY 30 MIN PRN
Status: DISCONTINUED | OUTPATIENT
Start: 2020-06-13 | End: 2020-06-14

## 2020-06-13 RX ORDER — NOREPINEPHRINE BITARTRATE 0.06 MG/ML
0.03-0.4 INJECTION, SOLUTION INTRAVENOUS CONTINUOUS
Status: DISCONTINUED | OUTPATIENT
Start: 2020-06-13 | End: 2020-06-16

## 2020-06-13 RX ORDER — IODIXANOL 320 MG/ML
100 INJECTION, SOLUTION INTRAVASCULAR ONCE
Status: COMPLETED | OUTPATIENT
Start: 2020-06-13 | End: 2020-06-13

## 2020-06-13 RX ORDER — FENTANYL CITRATE 50 UG/ML
INJECTION, SOLUTION INTRAMUSCULAR; INTRAVENOUS PRN
Status: DISCONTINUED | OUTPATIENT
Start: 2020-06-13 | End: 2020-06-13

## 2020-06-13 RX ORDER — NOREPINEPHRINE BITARTRATE 0.06 MG/ML
INJECTION, SOLUTION INTRAVENOUS
Status: COMPLETED
Start: 2020-06-13 | End: 2020-06-13

## 2020-06-13 RX ORDER — FUROSEMIDE 10 MG/ML
20 INJECTION INTRAMUSCULAR; INTRAVENOUS DAILY
Status: DISCONTINUED | OUTPATIENT
Start: 2020-06-13 | End: 2020-06-14

## 2020-06-13 RX ORDER — GABAPENTIN 300 MG/1
600 CAPSULE ORAL 3 TIMES DAILY
Status: DISCONTINUED | OUTPATIENT
Start: 2020-06-13 | End: 2020-06-14

## 2020-06-13 RX ADMIN — CEFTRIAXONE 1 G: 1 INJECTION, POWDER, FOR SOLUTION INTRAMUSCULAR; INTRAVENOUS at 11:21

## 2020-06-13 RX ADMIN — POTASSIUM PHOSPHATE, MONOBASIC AND POTASSIUM PHOSPHATE, DIBASIC 15 MMOL: 224; 236 INJECTION, SOLUTION INTRAVENOUS at 15:30

## 2020-06-13 RX ADMIN — FOLIC ACID 1 MG: 5 INJECTION, SOLUTION INTRAMUSCULAR; INTRAVENOUS; SUBCUTANEOUS at 07:50

## 2020-06-13 RX ADMIN — SODIUM CHLORIDE, POTASSIUM CHLORIDE, SODIUM LACTATE AND CALCIUM CHLORIDE 1000 ML: 600; 310; 30; 20 INJECTION, SOLUTION INTRAVENOUS at 21:54

## 2020-06-13 RX ADMIN — MEROPENEM 1 G: 1 INJECTION, POWDER, FOR SOLUTION INTRAVENOUS at 20:49

## 2020-06-13 RX ADMIN — MAGNESIUM SULFATE IN WATER 2 G: 40 INJECTION, SOLUTION INTRAVENOUS at 05:48

## 2020-06-13 RX ADMIN — MULTIPLE VITAMINS W/ MINERALS TAB 1 TABLET: TAB at 07:50

## 2020-06-13 RX ADMIN — ROCURONIUM BROMIDE 50 MG: 10 INJECTION INTRAVENOUS at 22:14

## 2020-06-13 RX ADMIN — BACLOFEN 10 MG: 10 TABLET ORAL at 14:28

## 2020-06-13 RX ADMIN — PHENYLEPHRINE HYDROCHLORIDE 100 MCG: 10 INJECTION INTRAVENOUS at 18:53

## 2020-06-13 RX ADMIN — LORAZEPAM 2 MG: 2 INJECTION INTRAMUSCULAR; INTRAVENOUS at 16:31

## 2020-06-13 RX ADMIN — SODIUM CHLORIDE, POTASSIUM CHLORIDE, SODIUM LACTATE AND CALCIUM CHLORIDE: 600; 310; 30; 20 INJECTION, SOLUTION INTRAVENOUS at 17:26

## 2020-06-13 RX ADMIN — MIDAZOLAM 2 MG: 1 INJECTION INTRAMUSCULAR; INTRAVENOUS at 22:10

## 2020-06-13 RX ADMIN — POTASSIUM CHLORIDE 20 MEQ: 29.8 INJECTION, SOLUTION INTRAVENOUS at 23:04

## 2020-06-13 RX ADMIN — SODIUM CHLORIDE, POTASSIUM CHLORIDE, SODIUM LACTATE AND CALCIUM CHLORIDE: 600; 310; 30; 20 INJECTION, SOLUTION INTRAVENOUS at 22:10

## 2020-06-13 RX ADMIN — IODIXANOL 85 ML: 320 INJECTION, SOLUTION INTRAVASCULAR at 19:59

## 2020-06-13 RX ADMIN — POTASSIUM CHLORIDE 20 MEQ: 29.8 INJECTION, SOLUTION INTRAVENOUS at 23:58

## 2020-06-13 RX ADMIN — PHENYLEPHRINE HYDROCHLORIDE 100 MCG: 10 INJECTION INTRAVENOUS at 18:05

## 2020-06-13 RX ADMIN — GABAPENTIN 600 MG: 300 CAPSULE ORAL at 09:37

## 2020-06-13 RX ADMIN — FENTANYL CITRATE 100 MCG: 50 INJECTION, SOLUTION INTRAMUSCULAR; INTRAVENOUS at 17:26

## 2020-06-13 RX ADMIN — DEXMEDETOMIDINE 0.2 MCG/KG/HR: 100 INJECTION, SOLUTION, CONCENTRATE INTRAVENOUS at 00:48

## 2020-06-13 RX ADMIN — PIPERACILLIN AND TAZOBACTAM 3.38 G: 3; .375 INJECTION, POWDER, FOR SOLUTION INTRAVENOUS at 03:36

## 2020-06-13 RX ADMIN — Medication 0.03 MCG/KG/MIN: at 23:38

## 2020-06-13 RX ADMIN — GABAPENTIN 600 MG: 300 CAPSULE ORAL at 20:49

## 2020-06-13 RX ADMIN — Medication 0.4 MG: at 16:28

## 2020-06-13 RX ADMIN — LORAZEPAM 2 MG: 2 INJECTION INTRAMUSCULAR; INTRAVENOUS at 14:52

## 2020-06-13 RX ADMIN — FUROSEMIDE 20 MG: 10 INJECTION, SOLUTION INTRAVENOUS at 09:38

## 2020-06-13 RX ADMIN — POTASSIUM PHOSPHATE, MONOBASIC AND POTASSIUM PHOSPHATE, DIBASIC 20 MMOL: 224; 236 INJECTION, SOLUTION INTRAVENOUS at 03:31

## 2020-06-13 RX ADMIN — BACLOFEN 10 MG: 10 TABLET ORAL at 20:55

## 2020-06-13 RX ADMIN — SODIUM CHLORIDE, POTASSIUM CHLORIDE, SODIUM LACTATE AND CALCIUM CHLORIDE 1000 ML: 600; 310; 30; 20 INJECTION, SOLUTION INTRAVENOUS at 20:49

## 2020-06-13 RX ADMIN — Medication 5000 UNITS: at 09:45

## 2020-06-13 RX ADMIN — PHENYLEPHRINE HYDROCHLORIDE 200 MCG: 10 INJECTION INTRAVENOUS at 17:41

## 2020-06-13 RX ADMIN — DIAZEPAM 10 MG: 5 INJECTION, SOLUTION INTRAMUSCULAR; INTRAVENOUS at 05:48

## 2020-06-13 RX ADMIN — MIDAZOLAM 2 MG: 1 INJECTION INTRAMUSCULAR; INTRAVENOUS at 17:26

## 2020-06-13 RX ADMIN — SUGAMMADEX 200 MG: 100 INJECTION, SOLUTION INTRAVENOUS at 20:13

## 2020-06-13 RX ADMIN — LORAZEPAM 1 MG: 2 INJECTION INTRAMUSCULAR; INTRAVENOUS at 12:24

## 2020-06-13 RX ADMIN — BACLOFEN 10 MG: 10 TABLET ORAL at 09:38

## 2020-06-13 RX ADMIN — PANTOPRAZOLE SODIUM 40 MG: 40 INJECTION, POWDER, FOR SOLUTION INTRAVENOUS at 07:50

## 2020-06-13 RX ADMIN — ROCURONIUM BROMIDE 50 MG: 10 INJECTION INTRAVENOUS at 17:29

## 2020-06-13 RX ADMIN — RIFAXIMIN 550 MG: 550 TABLET ORAL at 20:49

## 2020-06-13 RX ADMIN — MEROPENEM 1 G: 1 INJECTION, POWDER, FOR SOLUTION INTRAVENOUS at 12:24

## 2020-06-13 RX ADMIN — RIFAXIMIN 550 MG: 550 TABLET ORAL at 09:37

## 2020-06-13 RX ADMIN — SODIUM CHLORIDE, POTASSIUM CHLORIDE, SODIUM LACTATE AND CALCIUM CHLORIDE: 600; 310; 30; 20 INJECTION, SOLUTION INTRAVENOUS at 17:58

## 2020-06-13 RX ADMIN — PHENYLEPHRINE HYDROCHLORIDE 0.5 MCG/KG/MIN: 10 INJECTION INTRAVENOUS at 21:35

## 2020-06-13 RX ADMIN — GABAPENTIN 600 MG: 300 CAPSULE ORAL at 14:27

## 2020-06-13 RX ADMIN — Medication 0.4 MG: at 15:01

## 2020-06-13 RX ADMIN — LORAZEPAM 1 MG: 2 INJECTION INTRAMUSCULAR; INTRAVENOUS at 08:43

## 2020-06-13 RX ADMIN — LORAZEPAM 1 MG: 2 INJECTION INTRAMUSCULAR; INTRAVENOUS at 14:34

## 2020-06-13 RX ADMIN — DEXMEDETOMIDINE 0.4 MCG/KG/HR: 100 INJECTION, SOLUTION, CONCENTRATE INTRAVENOUS at 11:21

## 2020-06-13 RX ADMIN — LORAZEPAM 2 MG: 2 INJECTION INTRAMUSCULAR; INTRAVENOUS at 15:22

## 2020-06-13 RX ADMIN — LORAZEPAM 1 MG: 2 INJECTION INTRAMUSCULAR; INTRAVENOUS at 09:59

## 2020-06-13 RX ADMIN — THIAMINE HYDROCHLORIDE 200 MG: 100 INJECTION, SOLUTION INTRAMUSCULAR; INTRAVENOUS at 14:40

## 2020-06-13 RX ADMIN — THIAMINE HYDROCHLORIDE 200 MG: 100 INJECTION, SOLUTION INTRAMUSCULAR; INTRAVENOUS at 09:38

## 2020-06-13 RX ADMIN — PHENYLEPHRINE HYDROCHLORIDE 100 MCG: 10 INJECTION INTRAVENOUS at 17:30

## 2020-06-13 RX ADMIN — ACETAMINOPHEN 975 MG: 325 TABLET, FILM COATED ORAL at 09:37

## 2020-06-13 ASSESSMENT — COPD QUESTIONNAIRES
COPD: 1
COPD: 1

## 2020-06-13 ASSESSMENT — ACTIVITIES OF DAILY LIVING (ADL)
ADLS_ACUITY_SCORE: 18
ADLS_ACUITY_SCORE: 22

## 2020-06-13 NOTE — PROGRESS NOTES
Admitted/transferred from: ED  Reason for admission/transfer: Fall/ seizure.  2 RN skin assessment: completed by Nupur CORONADO .  Result of skin assessment and interventions/actions: R hip bruising, Left chest bruised- posterior/upper. Right upper arm/hand old scar- DAPHNE. Left thigh incision w/ bruised pre existing.  Height, weight, drug calc weight: Done.  Patient belongings (see Flowsheet)  MDRO education added to care plan Yes.  ?

## 2020-06-13 NOTE — CONSULTS
Urology Consult    Name: Abhijeet Mccauley    MRN: [id number]   YOB: 1960               Chief Complaint:   Urosepsis    History is obtained from chart review only.           History of Present Illness:   Abhijeet Mccauley is a 59 year old male with complex medical history including inflammatory demyelinating polyneuropathy, UC, alcohol abuse, COPD, CKD, cirrhosis w/ esophageal varices, recent history of R hip replacement 2 weeks ago.    He was transferred to [facility] after being brought to outside ER following a fall, with report of altered level of consciousness and seizures prior to transport, now intubated/sedated. Workup notable for rib fractures, leukocytosis to 28, positive UA, now with GNR bactermia and partially obstructive left renal pelvis stone on imaging, consult with concern for obstructive urosepsis.     On chart review he has been followed by Dr. Patton for this left partial staghorn and recurrent UTIs (enterobacter cloacae, one was intermediate to zosyn) with plans for consult to Dr. Hale, further workup pending. Per chart review no history of LUTS or other urologic conditions.    Patient was started on Zosyn, recently switched to meropenem, blood cultures with GNRs results pending.             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)     s/p chemo, no bone marrow transplant     Chronic obstructive pulmonary disease 5/17/2018     COPD (chronic obstructive pulmonary disease) (H)      History of pulmonary embolus (PE)      Hypertension      PUD (peptic ulcer disease)      Tobacco dependence      Ulcerative colitis (H)             Past Surgical History:       Past Surgical History:   Procedure Laterality Date     ARTHROPLASTY HIP Left 5/29/2020    Procedure: ARTHROPLASTY, HIP, TOTAL;  Surgeon: Carlton Jones  MD Andrés;  Location: [facility] OR     AS TOTAL KNEE ARTHROPLASTY Left      BONE MARROW BIOPSY, BONE SPECIMEN, NEEDLE/TROCAR N/A 6/12/2018    Procedure: BIOPSY BONE MARROW;  Bone Marrow Biopsy;  Surgeon: Demar Sapp MD;  Location: [facility] GI     ESOPHAGOSCOPY, GASTROSCOPY, DUODENOSCOPY (EGD), COMBINED N/A 2/8/2018    Procedure: COMBINED ESOPHAGOSCOPY, GASTROSCOPY, DUODENOSCOPY (EGD), BIOPSY SINGLE OR MULTIPLE;  gastroscopy with biopsies;  Surgeon: Raz Cobb MD;  Location: [facility] GI     ESOPHAGOSCOPY, GASTROSCOPY, DUODENOSCOPY (EGD), COMBINED N/A 3/18/2018    Procedure: COMBINED ESOPHAGOSCOPY, GASTROSCOPY, DUODENOSCOPY (EGD);;  Surgeon: Raz Cobb MD;  Location: [facility]     ESOPHAGOSCOPY, GASTROSCOPY, DUODENOSCOPY (EGD), COMBINED N/A 2/28/2020    Procedure: ESOPHAGOGASTRODUODENOSCOPY, WITH BIOPSY;  Surgeon: Chente Mclaughlin DO;  Location: [facility] GI     LACERATION REPAIR Right     Right leg     PHACOEMULSIFICATION WITH STANDARD INTRAOCULAR LENS IMPLANT Left 7/1/2019    Procedure: cataract removal with implant.;  Surgeon: Adrian Oliveira MD;  Location: [facility]     PHACOEMULSIFICATION WITH STANDARD INTRAOCULAR LENS IMPLANT Right 7/29/2019    Procedure: Cataract removal with implant.;  Surgeon: Adrian Oliveira MD;  Location: [facility] OR            Social History:     Social History     Tobacco Use     Smoking status: Current Every Day Smoker     Packs/day: 0.50     Years: 30.00     Pack years: 15.00     Types: Cigarettes     Smokeless tobacco: Never Used     Tobacco comment: 5 cigarettes a day    Substance Use Topics     Alcohol use: Yes     Comment: Down to 3 beers per day.  Sometimes a cocktail also.            Family History:       Family History   Problem Relation Age of Onset     Hypertension Mother      Coronary Artery Disease Father         MI            Allergies:     Allergies   Allergen Reactions     Blood Transfusion Related (Informational Only)      Patient has a history of a clinically  "significant antibody against RBC antigens.  A delay in compatible RBCs may occur.     Famotidine Unknown and Other (See Comments)     Severe abdominal cramps     Cyclobenzaprine Hives     Methocarbamol Other (See Comments)     Made pt's \"face break out\"     Pepcid Cramps     Severe abdominal cramps     Vancomycin Other (See Comments)     hallucinations     Vfend Other (See Comments)     IV - cold sweats, Iron tablet makes him nauseated and stomach ached     Hydrocodone-Acetaminophen Rash            Medications:       Current Facility-Administered Medications   Medication     acetaminophen (TYLENOL) tablet 975 mg     albuterol (PROVENTIL) neb solution 2.5 mg     baclofen (LIORESAL) tablet 10 mg     bisacodyl (DULCOLAX) Suppository 10 mg     dexmedetomidine (PRECEDEX) 400 mcg in 0.9% sodium chloride 100 mL     glucose gel 15-30 g    Or     dextrose 50 % injection 25-50 mL    Or     glucagon injection 1 mg     flumazenil (ROMAZICON) injection 0.2 mg     [START ON 6/15/2020] folic acid (FOLVITE) tablet 1 mg     folic acid injection 1 mg     furosemide (LASIX) injection 20 mg     gabapentin (NEURONTIN) capsule 600 mg     haloperidol lactate (HALDOL) injection 5 mg     heparin ANTICOAGULANT injection 5,000 Units     HYDROmorphone (DILAUDID) injection 0.2-0.5 mg     insulin aspart (NovoLOG) injection (RAPID ACTING)     LORazepam (ATIVAN) tablet 1-2 mg    Or     LORazepam (ATIVAN) injection 1-2 mg     magnesium sulfate 2 g in water intermittent infusion     magnesium sulfate 4 g in 100 mL sterile water (premade)     meropenem (MERREM) 1 g vial to attach to  mL bag     metoprolol (LOPRESSOR) injection 5 mg     multivitamin w/minerals (THERA-VIT-M) tablet 1 tablet     naloxone (NARCAN) injection 0.1-0.4 mg     naloxone (NARCAN) injection 0.1-0.4 mg     ondansetron (ZOFRAN-ODT) ODT tab 4 mg    Or     ondansetron (ZOFRAN) injection 4 mg     pantoprazole (PROTONIX) 40 mg IV push injection     polyethylene glycol (MIRALAX) " Packet 17 g     potassium chloride (KLOR-CON) Packet 20-40 mEq     potassium chloride 10 mEq in 100 mL intermittent infusion with 10 mg lidocaine     potassium chloride 10 mEq in 100 mL sterile water intermittent infusion (premix)     potassium chloride 20 mEq in 50 mL intermittent infusion     potassium chloride ER (KLOR-CON M) CR tablet 20-40 mEq     potassium phosphate 15 mmol in D5W 250 mL intermittent infusion     potassium phosphate 20 mmol in D5W 250 mL intermittent infusion     potassium phosphate 20 mmol in D5W 500 mL intermittent infusion     potassium phosphate 25 mmol in D5W 500 mL intermittent infusion     rifaximin (XIFAXAN) tablet 550 mg     senna-docusate (SENOKOT-S/PERICOLACE) 8.6-50 MG per tablet 1 tablet    Or     senna-docusate (SENOKOT-S/PERICOLACE) 8.6-50 MG per tablet 2 tablet     thiamine (B-1) 200 mg in sodium chloride 0.9 % 50 mL intermittent infusion     [START ON 6/15/2020] vitamin B1 (THIAMINE) tablet 100 mg     [START ON 6/20/2020] vitamin B1 (THIAMINE) tablet 100 mg             Review of Systems:    Unable to perform          Physical Exam:     VS:  T: 98.3    HR: 72    BP: 123/61    RR: 23   GEN:  Intubated, sedated, intermittently agitated with exam  LUNGS: Non-labored breathing.   BACK:  No midline or CVA tenderness.  ABD:  Soft.  NT.  ND.  No rebound or guarding.  No masses.  : Edema consistent with recent fluid resuscitation. Likely uncircumcised (prepucial edema noted). Catheter appears in good position  CHRISTIAN:  Deferred  EXT:  Warm, well perfused. Good capillary refill at extremities  SKIN:  Warm.  Dry.  No rashes.  NEURO:  A&O. CN grossly intact.            Data:   All laboratory data reviewed:    See HPI    All pertinent imaging reviewed, noted for above.          Impression and Plan:     Impression:   Abhijeet Mccauley is a 59 year old male with complex medical history, recent left hip surgery, imaging with concern for obstructed upper calyces with longstanding partial  "staghorn, gram negative bacteremia, presentation concerning for obstructive urosepsis though also recommend broad workup given he does not require pressers, his seizures/mental status changes not explained with obstructive urospesis alone.      Plan:     - Stat IR consult for PNT drainage. Unlikely to obtain retrograde access on our review of imaging.     - Agree with broad spectrum antibiotics with GNR douglas enterobacter coverage    - Continue catheter until patient is clinically improved 24 hours from infection    - Urology will follow for now. Please contact resident/PA on call with any questions or concerns.         This patient's exam findings, labs, and imaging discussed with urology staff surgeon, Dr. Lino, who developed the treatment plan.    Andreia Erickson MD  Urology Resident    For questions re this patient, please see \"contacting urology team\" instructions below, or refer to the call sheet     Contacting the Urology Team     Please use the following job codes to reach the Urology Team. Note that you must use an in house phone and that job codes cannot receive text pages.     On weekdays, dial [phone] (or star-star-star [phone] on the new [employer] telephones) then [phone] to reach the Adult Urology resident or PA on call    On weekdays, dial [phone] (or star-star-star [phone] on the new [employer] telephones) then [phone] to reach the Pediatric Urology resident    On weeknights and weekends, dial [phone] (or star-star-star [phone] on the new [employer] telephones) then [phone] to reach the Urology resident on call (for both Adult and Pediatrics)    Alternatively, you can reach urology pagers directly using the [employer] intranet as follows:    Open Internet Explorer (you must be logged to the [employer] intranet)    On the [employer] home page, hover over the Resources menu (4th menu from the [employer] symbol on the menu bar), which will reveal a submenu, then click \"In House Pagers and On Call Calendars\" (right column, 6th option from the " bottom).    Click the drop down menu next to the Date, and scroll down to Urology/[facility], then click it. You should see a list of the residents assigned to each site, with a hyperlink to their pager (click the pager icon)

## 2020-06-13 NOTE — CONSULTS
INTERVENTIONAL RADIOLOGY CONSULT      IR is consulted for left PNT placement.  59 year old male admitted after fall with rib fractures, found to have urinary tract infection and leukocytosis.  CT demonstrates large superior pole calculus with caliectasis and significant cortical atrophy.  The inferior pole, pelvis, and ureter are decompressed.  No ureteral or bladder stones.  Urology is requesting access into superior pole to decompress calyces and conduit for which stone will ultimately be treated.    -Will proceed with left percutaneous nephrostomy tube.      /61   Pulse 72   Temp 98.3  F (36.8  C) (Axillary)   Resp 23   Wt 82.1 kg (181 lb)   SpO2 100%   BMI 29.21 kg/m    Recent Labs   Lab Test 06/13/20  0443   WBC 19.1*   HGB 8.7*   *     Lab Results   Component Value Date    INR 1.64 06/12/2020    INR 1.47 06/12/2020       If procedure has been approved, IR charge RN can be contacted at [phone] for estimated time of procedure.     Discussed with IR staff, Dr. Barkley.    I agree with the assessment and plan documented by Dr. Watson.    Iris Barkley MD  Interventional Radiology   Pager [phone]

## 2020-06-13 NOTE — ED NOTES
Bed: ED34  Expected date: 6/12/20  Expected time:   Means of arrival:   Comments:  Abhijeet Mccauley  49 y M  [facility], hx of ETOH abuse  A&O x 3 upon arrival to ED at [facility], pt spiked a fever of 100.8 F and became increasingly confused, A&O x 1-2, had multiple seizures, Ativan given, became increasingly less responsive, on propofol drip, CIWA was 26, multiple rib fractures to rib side  ED to ED transfer from  Phillip Maza MD  06/12/20 2014

## 2020-06-13 NOTE — ANESTHESIA PREPROCEDURE EVALUATION
Anesthesia Pre-Procedure Evaluation    Patient: Abhijeet Mccauley   MRN:     [id number] Gender:   male   Age:    59 year old :      1960        Preoperative Diagnosis: Kidney stone [N20.0]   Procedure(s):  CREATION, NEPHROSTOMY, PERCUTANEOUS     LABS:  CBC:   Lab Results   Component Value Date    WBC 19.1 (H) 2020    WBC 28.5 (H) 2020    HGB 8.7 (L) 2020    HGB 8.3 (L) 2020    HCT 28.9 (L) 2020    HCT 27.4 (L) 2020     (L) 2020     2020     BMP:   Lab Results   Component Value Date     2020     2020    POTASSIUM 3.4 2020    POTASSIUM 3.7 2020    CHLORIDE 114 (H) 2020    CHLORIDE 113 (H) 2020    CO2 18 (L) 2020    CO2 18 (L) 2020    BUN 16 2020    BUN 15 2020    CR 1.44 (H) 2020    CR 1.39 (H) 2020     (H) 2020    GLC 79 2020     COAGS:   Lab Results   Component Value Date    PTT 33 2020    INR 1.64 (H) 2020    FIBR 323 12/10/2014     POC:   Lab Results   Component Value Date     (H) 2020     OTHER:   Lab Results   Component Value Date    LACT 1.4 2020    BEE 7.7 (L) 2020    PHOS 2.4 (L) 2020    MAG 2.5 (H) 2020    ALBUMIN 2.3 (L) 2020    PROTTOTAL 5.3 (L) 2020    ALT 14 2020    AST 30 2020    ALKPHOS 353 (H) 2020    BILITOTAL 1.7 (H) 2020    LIPASE 192 2020    STEW Canceled, Test credited 2020    TSH 0.94 2018    CRP 33.8 (H) 03/15/2019    SED 73 (H) 03/15/2019        Preop Vitals    BP Readings from Last 3 Encounters:   20 98/53   20 99/52   20 120/50    Pulse Readings from Last 3 Encounters:   20 102   20 87   20 67      Resp Readings from Last 3 Encounters:   20 30   20 16   20 16    SpO2 Readings from Last 3 Encounters:   20 92%   20 98%   20 95%      Temp Readings from  "Last 1 Encounters:   06/13/20 38.4  C (101.1  F) (Axillary)    Ht Readings from Last 1 Encounters:   06/12/20 1.676 m (5' 6\")      Wt Readings from Last 1 Encounters:   06/13/20 82.1 kg (181 lb)    Estimated body mass index is 29.21 kg/m  as calculated from the following:    Height as of an earlier encounter on 6/12/20: 1.676 m (5' 6\").    Weight as of this encounter: 82.1 kg (181 lb).     LDA:  Peripheral IV 06/13/20 Right Lower forearm (Active)   Site Assessment [facility] 06/13/20 0800   Line Status Infusing 06/13/20 1200   Phlebitis Scale 0-->no symptoms 06/13/20 1200   Infiltration Scale 0 06/13/20 1200   Number of days: 0       Peripheral IV 06/13/20 Left Upper forearm (Active)   Site Assessment [facility] 06/13/20 1200   Line Status Infusing 06/13/20 1200   Phlebitis Scale 0-->no symptoms 06/13/20 1200   Infiltration Scale 0 06/13/20 1200   Number of days: 0       Airway - Adult/Peds 7.5 endotracheal tube (Active)   Site Appearance Clean and dry 06/13/20 1541   Secured By Commercial tube coronel 06/13/20 1541   Secured at (cm) to lip 24 cm 06/13/20 1541   Cuff Pressure - Type minimal leak technique 06/13/20 1541   Tube Care/Reposition repositioned tube right side of mouth 06/13/20 1200   Bite Block Secure and Patent 06/13/20 1541   Safety Measures manual resuscitator at bedside 06/13/20 1541   Number of days: 1       NG/OG/NJ Tube Orogastric 18 fr Left mouth (Active)   Site Description WDL 06/13/20 1649   Status Suction-low intermittent 06/13/20 1649   Drainage Appearance Brown 06/13/20 1649   Placement Confirmation [place] unchanged 06/13/20 1649   [place] (cm marking) at nare/mouth 54 cm 06/13/20 1649   Container Amount 200 mL 06/13/20 1649   Output (ml) 50 ml 06/13/20 1649   Number of days: 1       Urethral Catheter Non-latex (Active)   Tube Description Positional 06/13/20 1649   Catheter Care Done;Catheter wipes 06/13/20 0800   Collection Container Standard 06/13/20 1646   Securement Method Securing device (Describe) " 06/13/20 1649   Rationale for Continued Use Strict 1-2 Hour I&O 06/13/20 1649   Urine Output 275 mL 06/13/20 1649   Number of days: 1        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)     s/p chemo, no bone marrow transplant     Chronic obstructive pulmonary disease 5/17/2018     COPD (chronic obstructive pulmonary disease) (H)      History of pulmonary embolus (PE)      Hypertension      PUD (peptic ulcer disease)      Tobacco dependence      Ulcerative colitis (H)       Past Surgical History:   Procedure Laterality Date     ARTHROPLASTY HIP Left 5/29/2020    Procedure: ARTHROPLASTY, HIP, TOTAL;  Surgeon: Carlton Jones MD;  Location: [facility]     AS TOTAL KNEE ARTHROPLASTY Left      BONE MARROW BIOPSY, BONE SPECIMEN, NEEDLE/TROCAR N/A 6/12/2018    Procedure: BIOPSY BONE MARROW;  Bone Marrow Biopsy;  Surgeon: Demar Sapp MD;  Location: [facility]     ESOPHAGOSCOPY, GASTROSCOPY, DUODENOSCOPY (EGD), COMBINED N/A 2/8/2018    Procedure: COMBINED ESOPHAGOSCOPY, GASTROSCOPY, DUODENOSCOPY (EGD), BIOPSY SINGLE OR MULTIPLE;  gastroscopy with biopsies;  Surgeon: Raz Cobb MD;  Location: [facility]     ESOPHAGOSCOPY, GASTROSCOPY, DUODENOSCOPY (EGD), COMBINED N/A 3/18/2018    Procedure: COMBINED ESOPHAGOSCOPY, GASTROSCOPY, DUODENOSCOPY (EGD);;  Surgeon: Raz Cobb MD;  Location: [facility]     ESOPHAGOSCOPY, GASTROSCOPY, DUODENOSCOPY (EGD), COMBINED N/A 2/28/2020    Procedure: ESOPHAGOGASTRODUODENOSCOPY, WITH BIOPSY;  Surgeon: Chente Mclaughlin DO;  Location: [facility]     LACERATION REPAIR Right     Right leg     PHACOEMULSIFICATION WITH STANDARD INTRAOCULAR LENS IMPLANT Left 7/1/2019    Procedure: cataract removal with implant.;  Surgeon: Adrian Oliveira MD;  Location: [facility]     PHACOEMULSIFICATION WITH STANDARD INTRAOCULAR LENS IMPLANT Right  "7/29/2019    Procedure: Cataract removal with implant.;  Surgeon: Adrian Oliveira MD;  Location: [facility]      Allergies   Allergen Reactions     Blood Transfusion Related (Informational Only)      Patient has a history of a clinically significant antibody against RBC antigens.  A delay in compatible RBCs may occur.     Famotidine Unknown and Other (See Comments)     Severe abdominal cramps     Cyclobenzaprine Hives     Methocarbamol Other (See Comments)     Made pt's \"face break out\"     Pepcid Cramps     Severe abdominal cramps     Vancomycin Other (See Comments)     hallucinations     Vfend Other (See Comments)     IV - cold sweats, Iron tablet makes him nauseated and stomach ached     Hydrocodone-Acetaminophen Rash        Anesthesia Evaluation     . Pt has had prior anesthetic. Type: General    No history of anesthetic complications          ROS/MED HX    ENT/Pulmonary: Comment: intubated    (+)COPD, , . .    Neurologic: Comment: inflammatory demyelinating polyneuropathy  Sedated with precedex      Cardiovascular:     (+) hypertension----. : . . . :. .       METS/Exercise Tolerance:     Hematologic:  - neg hematologic  ROS       Musculoskeletal: Comment: left hip replacement 2 weeks ago    R rib fractures          GI/Hepatic: Comment: cirrhosis w/ esophageal varices  UC        Renal/Genitourinary: Comment: concern for obstructive urosepsis on meropenem    (+) chronic renal disease, type: CRI,       Endo:  - neg endo ROS       Psychiatric: Comment: ETOH withdrawal        Infectious Disease:   (+) Other Infectious Disease concern for urosepsis      Malignancy:      - no malignancy   Other:    - neg other ROS                     PHYSICAL EXAM:   Mental Status/Neuro: Sedation (Iatrogenic)  Sedation: Dexmedetomidine Infusion   Airway: Facies: Feasible  Mallampati: Not Assessed  Mouth/Opening: Not Assessed  TM distance: Not Assessed  Neck ROM: Not Assessed  Airway Device: ETT   Respiratory:   Respiratory Effort: " mechanical ventilation.      CV: Rhythm: Regular   Comments:                      Assessment:   ASA SCORE: 4 emergent   H&P: History and physical reviewed and following examination; no interval change.   Smoking Status:  Non-Smoker/Unknown   NPO Status: NPO Appropriate     Plan:   Anes. Type:  General   Pre-Medication: None   Induction:  IV (Standard)   Airway: ETT; Oral   Access/Monitoring: PIV; 2nd PIV   Maintenance: Balanced     Postop Plan:   Postop Pain: Opioids  Postop Sedation/Airway: SECURED AIRWAY likely; Sedation likely       Postop Sedation: Propofol Infusion  Disposition: ICU     PONV Management:   Adult Risk Factors:, Non-Smoker, Postop Opioids   Prevention: Ondansetron     CONSENT: Deferred (Emergency)   Plan and risks discussed with: Not Applicable   Blood Products: N/a                   Liam Ledesma MD       Agree with anesthesia plan as outlined above    Meek Florez MD  Staff Anesthesiologist  [phone]

## 2020-06-13 NOTE — ED NOTES
Pt is rolled to look L posterior hip incision.  It appears to be healing well.  Bandage is replaced with new Auqacel bandage.

## 2020-06-13 NOTE — PROGRESS NOTES
SURGICAL ICU PROGRESS NOTE  June 13, 2020      PRIMARY TEAM: Trauma  PRIMARY PHYSICIAN: Dr. Walls    REASON FOR CRITICAL CARE ADMISSION: Intubated   ADMITTING PHYSICIAN: Dr. Dodson      ASSESSMENT: Abhijeet Mccauley is a 59 year old male with hx of alcohol abuse, liver cirrhosis MELD score 16, COPD, ulcerative colitis and renal insufficiency, 2 weeks s/p left hip replacement, presents after multiple falls, sustained R rib 3-8 fx, UA was positive, intubated in [facility] for seizures.    Today's Changes:    - stopping valium, PRN stating ativan  - discontinue fentanyl and start dilaudid  - Stop D5  - switched back from ceftriaxone to zosyn d/t positive blood culture  - Urology consult     PLAN:   Neuro/ pain/ sedation:  # Possible seizure disorder  Monitor neurological status. Notify the MD for any acute changes in exam.   CIWA protocol, discontinue propofol, neurology consult, seizure precautions    - PRN ativan     Sedation: Precedex gtt 0.4 (0.5), wean as able  - Haloperidol 5 mg Q4H PRN for agitation    Pain:   Dilaudid 0.2 - 0.5 mg Q1H PRN  baclofen 10 mg TID  acetaminophen 975 MG TID  gabapentin 600 mg TID      RASS goal set at 0 to -1    Neurology consult  Recommendations:  - Recommend management of provoking factors for seizures overnight   - No anti-seizure medication right now   - Notify me if there is concern for additional seizures and I will reassess   - Day team to direct further seizure work-up as appropriate      Home meds  Hold for now  Tramadol  Trazadone  hydroxizine  Bupropion     Pulmonary care:   VC   23/ 430/ 5/ 35%    VBG @ 0000   7.39/ 54/ 29/ 17 (7.26/ 49/ 40/ 18)    Albuterol 2.5 mg PRN    PST this morning    # Rib fractures  R rib 3-8 fx, multiple have anterior and posterior components    Look to restart home meds once he wakes up  Home meds  Albuterol, tiotropium  Montelukast, loratadine  Dulera     Cardiovascular:    # HLD  Monitor hemodynamic status.     MAPs 71 - 112  HR 64 - 87     Home  meds  On home atorvastatin 20 mg daily  ASA 81 mg daily       GI care:   # Alcohol abuse, liver cirrhosis MELD score 16  NPO except meds, NG tube to LIS   - Furosemide 20 mg daily  - Dulcolax supp 10 mg daily, PEG daily  - Thiamine and folic acid    Ammonia 71  Restart Rifaximin 550 mg BID     Home meds  Hold on resrtating all anti hypertensives  Propranolol 20 mg BID  Spironolactone 50 mg daily      Fluids/ Electrolytes/ Nutrition:   Continue to monitor I/Os   I/O: 2.5/ 2.1 (-0.4)    No indication for parenteral nutrition.    Cr 1.44 (1.39) at his baseline    Electrolyte replacement protocol       Endocrine:    No history of diabetes  Sliding scale for diabetes management.     - 121      ID/ Antibiotics:  #UTI  - Continue zosyn    WBC 19.1 (28.5)    Bcx NGTD  Ucx NGTD    Heme:       Hgb 8.7 (8.3)  INR 1.64  PTT 33       Prophylaxis:    Mechanical prophylaxis for DVT  SubQ heparin TID          Miscellaneous:    PT, OT, social work for dispo       Lines/ tubes/ drains:  ETT, ng tube, PIV x2, urinary catheter     Disposition:  Surgical ICU.    Patient seen and discussed with staff.    Chente Tijerina MD    ====================================    TODAY'S SUBJECTIVE/INTERVAL HISTORY:   - NAEO    OBJECTIVE:     Temp:  [97.5  F (36.4  C)-100.8  F (38.2  C)] 97.7  F (36.5  C)  Pulse:  [64-96] 67  Heart Rate:  [] 68  Resp:  [8-29] 23  BP: ()/() 114/62  FiO2 (%):  [35 %-50 %] 35 %  SpO2:  [95 %-100 %] 100 %  Ventilation Mode: CMV/AC  (Continuous Mandatory Ventilation/ Assist Control)  FiO2 (%): 35 %  Rate Set (breaths/minute): 23 breaths/min  Tidal Volume Set (mL): 430 mL  PEEP (cm H2O): 5 cmH2O  Oxygen Concentration (%): 35 %  Resp: 23      I/O last 3 completed shifts:  In: 2404.29 [I.V.:1404.29; IV Piggyback:1000]  Out: 320 [Urine:320]    General/Neuro: Intubated and sedated, does not awake to voice  Pulm: Mechanically ventilated  Abd: soft; NT, ND  Extremities: no edema  Skin: warm and well-perfused.      LABS:   Arterial Blood Gases   No lab results found in last 7 days.  Complete Blood Count   Recent Labs   Lab 06/13/20 0443 06/12/20 2110 06/12/20  1005   WBC 19.1* 28.5* 15.2*   HGB 8.7* 8.3* 10.2*   * 158 201     Basic Metabolic Panel  Recent Labs   Lab 06/13/20 0443 06/12/20 2110 06/12/20  1005    141 138   POTASSIUM 3.4 3.7 3.8   CHLORIDE 114* 113* 106   CO2 18* 18* 25   BUN 16 15 17   CR 1.44* 1.39* 1.39*   * 79 121*     Liver Function Tests  Recent Labs   Lab 06/13/20 0443 06/12/20 2209 06/12/20 2110 06/12/20  1005   AST 30 Canceled, Test credited Canceled, Test credited 38   ALT 14 16 16 19   ALKPHOS 353*  --  340* 464*   BILITOTAL 1.7*  --  2.0* 1.9*   ALBUMIN 2.3*  --  2.3* 2.9*   INR  --   --  1.64* 1.47*     Pancreatic Enzymes  Recent Labs   Lab 06/12/20  1005   LIPASE 192     Coagulation Profile  Recent Labs   Lab 06/12/20 2110 06/12/20  1005   INR 1.64* 1.47*   PTT 33 37         IMAGING:   Recent Results (from the past 24 hour(s))   CT Head w/o Contrast    Narrative    CT SCAN OF THE HEAD WITHOUT CONTRAST   6/12/2020 10:53 AM     HISTORY:  Fall.    TECHNIQUE:  Axial images of the head and coronal reformations without  IV contrast material.  Radiation dose for this scan was reduced using  automated exposure control, adjustment of the mA and/or kV according  to patient size, or iterative reconstruction technique.    COMPARISON: 2/25/2019    FINDINGS: There is some cerebral atrophy. Minimal nonspecific white  matter changes without mass effect are present. There is no evidence  for intracranial hemorrhage, mass effect, acute infarct, or skull  fracture. Visualized paranasal sinuses and mastoid air cells are  clear. Vascular calcifications noted.      Impression    IMPRESSION: Chronic changes. No evidence for intracranial hemorrhage  or new acute process.    ABY WADDELL MD   CT Cervical Spine w/o Contrast    Narrative    CT CERVICAL SPINE WITHOUT CONTRAST   6/12/2020 10:54  AM     HISTORY: Fall.     TECHNIQUE: Axial images of the cervical spine were obtained without  intravenous contrast. Multiplanar reformations were performed.  Radiation dose for this scan was reduced using automated exposure  control, adjustment of the mA and/or kV according to patient size, or  iterative reconstruction technique.     COMPARISON: 2/25/2019.    FINDINGS: Sagittal reconstructions demonstrate minimal chronic  degenerative spondylolisthesis of C2 on C3, otherwise normal posterior  alignment. There is no evidence for fracture. Multilevel degenerative  disc and facet disease is present with moderate to severe central  canal stenoses at C5-C6 and C6-C7. There is also moderate to severe  bilateral neural foraminal stenoses at several levels. Extensive  vascular calcifications noted.      Impression    IMPRESSION:  1. No evidence for fracture.  2. Multilevel degenerative disc and facet disease again noted.    ABY WADDELL MD   Shoulder XR, 2 view, right    Narrative    SHOULDER TWO VIEWS RIGHT, PELVIS ONE TO TWO VIEWS, RIBS AND CHEST  RIGHT THREE VIEWS June 12, 2020 11:26 AM     HISTORY: Right rib pain, right shoulder pain and hip pain after a  fall.    COMPARISON: AP pelvis 12/5/2019. Two view chest 8/28/2019.      Impression    IMPRESSION:     Right shoulder: Advanced osteoarthritis at the glenohumeral joint.  Vascular calcifications. Multiple rib fractures described below. No  other acute bony abnormality.    Chest/right Ribs: No active cardiopulmonary disease. There are acute  displaced fractures of the posterior right third through seventh ribs  as well as the lateral right seventh rib and anterior lateral right  eighth and ninth ribs. The chest x-ray also shows slightly displaced  fractures of the left posterolateral fourth and fifth ribs. These may  be old and healed but were not definitely present on prior chest  x-rays.    AP pelvis: Interval placement of a left hip arthroplasty with  components in  the expected location on the AP view. No acute appearing  bony abnormality. Extensive vascular calcifications.    AKIN DORANTES MD   XR Pelvis 1/2 Views    Narrative    SHOULDER TWO VIEWS RIGHT, PELVIS ONE TO TWO VIEWS, RIBS AND CHEST  RIGHT THREE VIEWS June 12, 2020 11:26 AM     HISTORY: Right rib pain, right shoulder pain and hip pain after a  fall.    COMPARISON: AP pelvis 12/5/2019. Two view chest 8/28/2019.      Impression    IMPRESSION:     Right shoulder: Advanced osteoarthritis at the glenohumeral joint.  Vascular calcifications. Multiple rib fractures described below. No  other acute bony abnormality.    Chest/right Ribs: No active cardiopulmonary disease. There are acute  displaced fractures of the posterior right third through seventh ribs  as well as the lateral right seventh rib and anterior lateral right  eighth and ninth ribs. The chest x-ray also shows slightly displaced  fractures of the left posterolateral fourth and fifth ribs. These may  be old and healed but were not definitely present on prior chest  x-rays.    AP pelvis: Interval placement of a left hip arthroplasty with  components in the expected location on the AP view. No acute appearing  bony abnormality. Extensive vascular calcifications.    AKIN DORANTES MD   Ribs XR, unilat 3 views + PA chest, right    Narrative    SHOULDER TWO VIEWS RIGHT, PELVIS ONE TO TWO VIEWS, RIBS AND CHEST  RIGHT THREE VIEWS June 12, 2020 11:26 AM     HISTORY: Right rib pain, right shoulder pain and hip pain after a  fall.    COMPARISON: AP pelvis 12/5/2019. Two view chest 8/28/2019.      Impression    IMPRESSION:     Right shoulder: Advanced osteoarthritis at the glenohumeral joint.  Vascular calcifications. Multiple rib fractures described below. No  other acute bony abnormality.    Chest/right Ribs: No active cardiopulmonary disease. There are acute  displaced fractures of the posterior right third through seventh ribs  as well as the lateral right  seventh rib and anterior lateral right  eighth and ninth ribs. The chest x-ray also shows slightly displaced  fractures of the left posterolateral fourth and fifth ribs. These may  be old and healed but were not definitely present on prior chest  x-rays.    AP pelvis: Interval placement of a left hip arthroplasty with  components in the expected location on the AP view. No acute appearing  bony abnormality. Extensive vascular calcifications.    AKIN DORANTES MD   CT Chest/Abdomen/Pelvis w Contrast    Narrative    CT CHEST, ABDOMEN, AND PELVIS WITH CONTRAST 6/12/2020 1:33 PM     HISTORY: Fall; multiple rib fractures.    COMPARISON: October 3, 2019    TECHNIQUE: Volumetric helical acquisition of CT images from the lung  apices through the symphysis pubis after the administration of 90 mL  Isovue 370 intravenous contrast. Radiation dose for this scan was  reduced using automated exposure control, adjustment of the mA and/or  kV according to patient size, or iterative reconstruction technique.    FINDINGS:   Chest: Fractures of the right third, fourth, fifth, sixth, seventh,  and eighth ribs on the right noted. Multiple rib fractures have  posterior and anterolateral components. No definite pneumothorax.  Trace peripheral atelectasis and/or fibrosis. Trace atelectasis and/or  fibrosis on the right. Left lung clear. No definite left-sided  fractures. Extensive gastroesophageal varices.    Abdomen and pelvis: Cirrhotic liver morphology. Multiple portal  systemic collaterals. No definite splenomegaly. Cholelithiasis without  cholecystitis. Adrenal glands, pancreas, and spleen are unremarkable.  Some atrophy and a large calcification seen in the left kidney  measuring up to 1.5 cm. Right kidney unremarkable. No hydronephrosis.  There are moderate atherosclerotic changes of the visualized aorta and  its branches. There is no evidence of aortic dissection or aneurysm.  There are no dilated loops of small intestine or large  bowel to  suggest ileus or obstruction.    No frankly destructive bony lesions.      Impression    IMPRESSION:  1. Right-sided third-eighth rib fractures, multiple ribs have anterior  and posterior components.  2. Otherwise no acute process demonstrated in the abdomen and pelvis.    MAUREEN BERNABE MD   XR Chest Port 1 View    Narrative    CHEST ONE VIEW PORTABLE   6/12/2020 4:49 PM     HISTORY: Post intubation.    COMPARISON: None.      Impression    IMPRESSION: Tip of the endotracheal tube is 5.5 cm above the masoud.  Nasogastric tube extends below the left hemidiaphragm. No pneumothorax  or pleural effusion. Mild interstitial abnormalities without gross  airspace consolidation.    MAGGY KAMINSKI MD

## 2020-06-13 NOTE — H&P
[facility], [place]    History and Physical / Consult note: Trauma Service    Date of Admission:  6/12/2020    Time of Admission/Consult Request (page/call): 2030    Time of my evaluation: 2040  Consulting services:  none    Assessment & Plan Trauma mechanism:fall  Time/date of injury:6/12/2020  Known Injuries:  1. Multiple rib fractures  2. AMS  3. Urosepsis  4. Possible seizure  5. Possible EtOH withdrawal    Plan  -Admit to SICU  -IV zosyn for urosepsis  -Neurology consult for seizure w/u and treatment (unclear if 2/2 EtOH withdrawal or sepsis)  -CIWA protocol  -cont propofol  -prn for pain  -banana bag    ETOH: Intubated, sedated. Unable to obtain.  Primary Care Physician   Chidi Valenzuela    Chief Complaint   fall    History is obtained from the electronic health record    History of Present Illness   Abhijeet Mccauley is a 59 year old male 2 weeks s/p L hip replacement who was found down in the garage by a home nurse. Patient initially seen at [facility]. A&O x 3 upon arrival; pt spiked a fever of 100.8 F and became increasingly confused, A&O x 1-2, had multiple seizures, 10mg Ativan given, became increasingly less responsive, on propofol drip, CIWA was 26, multiple rib fractures to rib side.     Past Medical History    I have reviewed this patient's medical history and updated it with pertinent information if needed.   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)     s/p chemo, no bone marrow transplant     Chronic obstructive pulmonary disease 5/17/2018     COPD (chronic obstructive pulmonary disease) (H)      History of pulmonary embolus (PE)      Hypertension      PUD (peptic ulcer disease)      Tobacco dependence      Ulcerative colitis (H)        Past Surgical History   I have reviewed this patient's surgical history and updated it  with pertinent information if needed.  Past Surgical History:   Procedure Laterality Date     ARTHROPLASTY HIP Left 5/29/2020    Procedure: ARTHROPLASTY, HIP, TOTAL;  Surgeon: Carlton Jones MD;  Location: [facility] OR     AS TOTAL KNEE ARTHROPLASTY Left      BONE MARROW BIOPSY, BONE SPECIMEN, NEEDLE/TROCAR N/A 6/12/2018    Procedure: BIOPSY BONE MARROW;  Bone Marrow Biopsy;  Surgeon: Demar Sapp MD;  Location: [facility]     ESOPHAGOSCOPY, GASTROSCOPY, DUODENOSCOPY (EGD), COMBINED N/A 2/8/2018    Procedure: COMBINED ESOPHAGOSCOPY, GASTROSCOPY, DUODENOSCOPY (EGD), BIOPSY SINGLE OR MULTIPLE;  gastroscopy with biopsies;  Surgeon: Raz Cobb MD;  Location: [facility]     ESOPHAGOSCOPY, GASTROSCOPY, DUODENOSCOPY (EGD), COMBINED N/A 3/18/2018    Procedure: COMBINED ESOPHAGOSCOPY, GASTROSCOPY, DUODENOSCOPY (EGD);;  Surgeon: Raz Cobb MD;  Location: [facility]     ESOPHAGOSCOPY, GASTROSCOPY, DUODENOSCOPY (EGD), COMBINED N/A 2/28/2020    Procedure: ESOPHAGOGASTRODUODENOSCOPY, WITH BIOPSY;  Surgeon: Chente Mclaughlin DO;  Location: [facility]     LACERATION REPAIR Right     Right leg     PHACOEMULSIFICATION WITH STANDARD INTRAOCULAR LENS IMPLANT Left 7/1/2019    Procedure: cataract removal with implant.;  Surgeon: Adrian Oliveira MD;  Location: [facility]     PHACOEMULSIFICATION WITH STANDARD INTRAOCULAR LENS IMPLANT Right 7/29/2019    Procedure: Cataract removal with implant.;  Surgeon: Adrian Oliveira MD;  Location: [facility]     Prior to Admission Medications   Prior to Admission Medications   Prescriptions Last Dose Informant Patient Reported? Taking?   DULERA 200-5 MCG/ACT inhaler   No No   Sig: INHALE TWO PUFFS BY MOUTH TWICE A DAY   acetaminophen 500 MG PO tablet   No No   Sig: Take 1-2 tablets (500-1,000 mg) by mouth every 6 hours as needed for mild pain Do not take more than 3000mg acetaminophen total in one day.   albuterol (VENTOLIN HFA) 108 (90 Base) MCG/ACT inhaler   No No   Sig: Inhale 2  puffs into the lungs every 4 hours as needed for shortness of breath / dyspnea or wheezing   aspirin (ASA) 81 MG EC tablet   No No   Sig: Take 4 tablets (325 mg) by mouth daily   atorvastatin 20 MG PO tablet   No No   Sig: Take 1 tablet (20 mg) by mouth daily   baclofen (LIORESAL) 10 MG tablet   No No   Sig: TAKE 1/2 TO 1 TABLET BY MOUTH UP TO THREE TIMES A DAY   Patient taking differently: Take 10 mg by mouth 3 times daily    buPROPion (WELLBUTRIN SR) 150 MG 12 hr tablet   No No   Sig: Take once per day for 3 days and then increase to twice per day   Patient taking differently: Take 150 mg by mouth 2 times daily    desonide (DESOWEN) 0.05 % external cream   Yes No   Sig: Apply 1 Application topically   diclofenac (VOLTAREN) 1 % topical gel   No No   Sig: PLACE 2 GRAMS ONTO THE SKIN FOUR TIMES A DAY AS NEEDED FOR MODERATE PAIN   docusate sodium (COLACE) 100 MG tablet   No No   Sig: Take 1 tablet (100 mg) by mouth 2 times daily as needed for constipation   folic acid (FOLVITE) 1 MG tablet   No No   Sig: Take 2 tablets (2 mg) by mouth daily   furosemide (LASIX) 20 MG tablet   No No   Sig: Take 1 tablet (20 mg) by mouth every morning   gabapentin (NEURONTIN) 300 MG capsule   No No   Sig: TAKE TWO CAPSULES BY MOUTH THREE TIMES A DAY   hydrOXYzine (ATARAX) 25 MG tablet   No No   Sig: Take 1-2 tablets (25-50 mg) by mouth every 6 hours as needed for itching or anxiety (Take with pain meds.  Helps with nausea, muscle spasms)   loratadine (CLARITIN) 10 MG tablet   No No   Sig: Take 1 tablet (10 mg) by mouth daily as needed for allergies   Patient taking differently: Take 10 mg by mouth 2 times daily    montelukast (SINGULAIR) 10 MG tablet   No No   Sig: Take 1 tablet (10 mg) by mouth At Bedtime   nicotine 2 MG MT lozenge   No No   Sig: Place 1 lozenge (2 mg) inside cheek every hour as needed for smoking cessation   nystatin (MYCOSTATIN) 438606 UNIT/ML suspension   No No   Sig: TAKE 5 MLS BY MOUTH FOUR TIMES A DAY FOR 10 DAYS    order for DME  Self No No   Sig: Equipment being ordered: shower chair   order for DME   No No   Sig: Equipment being ordered: bed pull up bar   order for DME   No No   Sig: Equipment being ordered: Compression Stockings TWO (2) Pairs, 15-20 mm Hg.   order for DME   No No   Sig: Equipment being ordered: 2 pairs thigh high compression stockings, strength per patient preference   order for DME   No No   Sig: Equipment being ordered: 4 wheel walker   oxyCODONE (ROXICODONE) 5 MG tablet   No No   Sig: Take 1-2 tablets (5-10 mg) by mouth every 3 hours as needed   pantoprazole (PROTONIX) 40 MG EC tablet   No No   Sig: Take 1 tablet (40 mg) by mouth 2 times daily   polyethylene glycol (MIRALAX) 17 g packet   No No   Sig: Take 17 g by mouth daily   potassium chloride ER (KLOR-CON M) 10 MEQ CR tablet   Yes No   propranolol (INDERAL) 20 MG tablet   No No   Sig: Take 1 tablet (20 mg) by mouth 2 times daily   rifaximin (XIFAXAN) 550 MG TABS tablet   No No   Sig: Take 1 tablet (550 mg) by mouth 2 times daily   senna-docusate (SENOKOT-S/PERICOLACE) 8.6-50 MG tablet   No No   Sig: Take 1-2 tablets by mouth daily as needed for constipation Take while on oral narcotics to prevent or treat constipation.   spironolactone (ALDACTONE) 50 MG tablet   Yes No   Sig: Take 50 mg by mouth daily   tiotropium (SPIRIVA) 18 MCG capsule  Self Yes No   Sig: Inhale 1 capsule into the lungs daily Using PRN   traMADol (ULTRAM) 50 MG tablet   No No   Sig: TAKE ONE TABLET BY MOUTH EVERY 6 HOURS AS NEEDED FOR SEVERE PAIN. MAX 4 TABLETS PER DAY. MUST LAST 30 DAYS.   traZODone (DESYREL) 50 MG tablet   No No   Sig: TAKE ONE TABLET BY MOUTH ONCE DAILY AT BEDTIME   trolamine salicylate (ASPERCREME) 10 % external cream   No No   Sig: Apply topically as needed for moderate pain      Facility-Administered Medications: None     Allergies   Allergies   Allergen Reactions     Blood Transfusion Related (Informational Only)      Patient has a history of a clinically  "significant antibody against RBC antigens.  A delay in compatible RBCs may occur.     Famotidine Unknown and Other (See Comments)     Severe abdominal cramps     Cyclobenzaprine Hives     Methocarbamol Other (See Comments)     Made pt's \"face break out\"     Pepcid Cramps     Severe abdominal cramps     Vancomycin Other (See Comments)     hallucinations     Vfend Other (See Comments)     IV - cold sweats, Iron tablet makes him nauseated and stomach ached     Hydrocodone-Acetaminophen Rash       Social History   Social History     Socioeconomic History     Marital status: Single     Spouse name: Not on file     Number of children: Not on file     Years of education: Not on file     Highest education level: Not on file   Occupational History     Not on file   Social Needs     Financial resource strain: Not on file     Food insecurity     Worry: Not on file     Inability: Not on file     Transportation needs     Medical: Not on file     Non-medical: Not on file   Tobacco Use     Smoking status: Current Every Day Smoker     Packs/day: 0.50     Years: 30.00     Pack years: 15.00     Types: Cigarettes     Smokeless tobacco: Never Used     Tobacco comment: 5 cigarettes a day    Substance and Sexual Activity     Alcohol use: Yes     Comment: Down to 3 beers per day.  Sometimes a cocktail also.     Drug use: Yes     Types: Marijuana     Sexual activity: Not on file   Lifestyle     Physical activity     Days per week: Not on file     Minutes per session: Not on file     Stress: Not on file   Relationships     Social connections     Talks on phone: Not on file     Gets together: Not on file     Attends [employer] service: Not on file     Active member of club or organization: Not on file     Attends meetings of clubs or organizations: Not on file     Relationship status: Not on file     Intimate partner violence     Fear of current or ex partner: Not on file     Emotionally abused: Not on file     Physically abused: Not on file "     Forced sexual activity: Not on file   Other Topics Concern     Parent/sibling w/ CABG, MI or angioplasty before 65F 55M? Not Asked   Social History Narrative     Not on file       Family History   I have reviewed this patient's family history and updated it with pertinent information if needed.   Family History   Problem Relation Age of Onset     Hypertension Mother      Coronary Artery Disease Father         MI       Review of Systems   CONSTITUTIONAL: No fever, chills, sweats, fatigue   EYES: no visual blurring, no double vision or visual loss  ENT: no decrease in hearing, no tinnitus, no vertigo, no hoarseness  RESPIRATORY: no shortness of breath, no cough, no sputum   CARDIOVASCULAR: no palpitations, no chest  pain, no exertional chest pain or pressure  GASTROINTESTINAL: no nausea or vomiting, or abd pain  GENITOURINARY: no dysuria, no frequency or hesitancy, no hematuria  MUSCULOSKELETAL: no weakness, no redness, no swelling, no joint pain,   SKIN: no rashes, ecchymoses, abrasions or lacerations  NEUROLOGIC: no numbness or tingling of hands, no numbness or tingling  of feet, no syncope, no tremors or weakness  PSYCHIATRIC: no sleep disturbances, no anxiety or depression    Physical Exam   Temp: 98.2  F (36.8  C) Temp src: Axillary BP: 137/67 Pulse: 79   Resp: 24   O2 Device: Mechanical Ventilator    Vital Signs with Ranges  Temp:  [98.1  F (36.7  C)-100.8  F (38.2  C)] 98.2  F (36.8  C)  Pulse:  [79-96] 79  Heart Rate:  [] 87  Resp:  [8-29] 24  BP: ()/() 137/67  FiO2 (%):  [50 %] 50 %  SpO2:  [95 %-99 %] 98 % 0 lbs 0 oz    Primary Survey:  Airway: patient talking  Breathing: symmetric respiratory effort bilaterally  Circulation: central pulses present and peripheral pulses present  Disability: Pupils - left 4 mm and brisk, right 4 mm and brisk   Ward Coma Scale - Total 3T/15  Eye Response (E): 1  4= spontaneous,  3= to verbal/voice, 2=  to pain, 1= No response   Verbal Response (V): 1    5= Orientated, converses,  4= Confused, converses, 3= Inappropriate words,  2= Incomprehensible sounds,  1=No response   Motor Response (M): 1   6= Obeys commands, 5= Localizes to pain, 4= Withdrawal to pain, 3=Fexion to pain, 2= Extension to pain, 1= No response    Secondary Survey:  General: alert, oriented to person, place, time  Neuro: PERRLA. EOMI. CN II-XII grossly intact. No focal deficits. Sedated  Head: atraumatic, normocephalic, trachea midline  Eyes:  Pupils 2mm, EOMI, corneas and conjunctivae clear  Ears: pearly grey bilateral TMs and non-inflamed external ear canals  Nose: nares patent, no drainage, nasal septum non-tender  Mouth/Throat: no exudates or erythema,  no dental tenderness or malocclusions, no tongue lacerations  Neck:  No midline posterior tenderness, full AROM without pain.   Chest/Pulmonary: normal respiratory rate and rhythm,  bilateral clear breath sounds, no wheezes, rales or rhonchi, no chest wall tenderness or deformities,   Cardiovascular: S1, S2,  normal and regular rate and rhythm, no murmurs  Abdomen: soft, non-tender, no guarding, no rebound tenderness and no tenderness to palpation  : normal external genitalia, pelvis stable to lateral compression  Musculoskel/Extremities: normal extremities, full AROM of major joints without tenderness, edema, erythema, ecchymosis, or abrasions. palp PP. no edema.   Back/Spine: no deformity, no midline tenderness, no sacral tenderness, no step-offs and no abrasions or contusions  Hands: no gross deformities of hands or fingers. Full AROM of hand and fingers in flexion and extension.  strength equal and symmetric.   Psychiatric: affect/mood normal, cooperative, normal judgement/insight and memory intact  Skin: no rashes, laceration, ecchymosis, skin warm and dry.     Results for orders placed or performed during the hospital encounter of 06/12/20 (from the past 24 hour(s))   CBC with platelets differential   Result Value Ref Range    WBC  28.5 (H) 4.0 - 11.0 10e9/L    RBC Count 2.54 (L) 4.4 - 5.9 10e12/L    Hemoglobin 8.3 (L) 13.3 - 17.7 g/dL    Hematocrit 27.4 (L) 40.0 - 53.0 %     (H) 78 - 100 fl    MCH 32.7 26.5 - 33.0 pg    MCHC 30.3 (L) 31.5 - 36.5 g/dL    RDW 27.7 (H) 10.0 - 15.0 %    Platelet Count 158 150 - 450 10e9/L    Diff Method Automated Method     % Neutrophils 90.0 %    % Lymphocytes 1.5 %    % Monocytes 6.0 %    % Eosinophils 0.1 %    % Basophils 0.2 %    % Immature Granulocytes 2.2 %    Nucleated RBCs 0 0 /100    Absolute Neutrophil 25.6 (H) 1.6 - 8.3 10e9/L    Absolute Lymphocytes 0.4 (L) 0.8 - 5.3 10e9/L    Absolute Monocytes 1.7 (H) 0.0 - 1.3 10e9/L    Absolute Eosinophils 0.0 0.0 - 0.7 10e9/L    Absolute Basophils 0.1 0.0 - 0.2 10e9/L    Abs Immature Granulocytes 0.6 (H) 0 - 0.4 10e9/L    Absolute Nucleated RBC 0.0    INR   Result Value Ref Range    INR 1.64 (H) 0.86 - 1.14   Partial thromboplastin time   Result Value Ref Range    PTT 33 22 - 37 sec   Comprehensive metabolic panel   Result Value Ref Range    Sodium 141 133 - 144 mmol/L    Potassium 3.7 3.4 - 5.3 mmol/L    Chloride 113 (H) 94 - 109 mmol/L    Carbon Dioxide 18 (L) 20 - 32 mmol/L    Anion Gap 9 3 - 14 mmol/L    Glucose 79 70 - 99 mg/dL    Urea Nitrogen 15 7 - 30 mg/dL    Creatinine 1.39 (H) 0.66 - 1.25 mg/dL    GFR Estimate 54 (L) >60 mL/min/[1.73_m2]    GFR Estimate If Black 63 >60 mL/min/[1.73_m2]    Calcium 7.5 (L) 8.5 - 10.1 mg/dL    Bilirubin Total 2.0 (H) 0.2 - 1.3 mg/dL    Albumin 2.3 (L) 3.4 - 5.0 g/dL    Protein Total 5.3 (L) 6.8 - 8.8 g/dL    Alkaline Phosphatase 340 (H) 40 - 150 U/L    ALT 16 0 - 70 U/L    AST Canceled, Test credited 0 - 45 U/L   Ammonia   Result Value Ref Range    Ammonia Canceled, Test credited 10 - 50 umol/L   Alcohol ethyl   Result Value Ref Range    Ethanol g/dL <0.01 <0.01 g/dL   Blood gas venous   Result Value Ref Range    Ph Venous 7.26 (L) 7.32 - 7.43 pH    PCO2 Venous 40 40 - 50 mm Hg    PO2 Venous 49 (H) 25 - 47 mm Hg     Bicarbonate Venous 18 (L) 21 - 28 mmol/L    Base Deficit Venous 8.3 mmol/L    FIO2 35%    Lactic acid whole blood   Result Value Ref Range    Lactic Acid 1.4 0.7 - 2.0 mmol/L   Blood gas venous   Result Value Ref Range    Ph Venous 7.31 (L) 7.32 - 7.43 pH    PCO2 Venous 35 (L) 40 - 50 mm Hg    PO2 Venous 46 25 - 47 mm Hg    Bicarbonate Venous 18 (L) 21 - 28 mmol/L    Base Deficit Venous 8.0 mmol/L    FIO2 100%        Studies:  No orders to display     CT head  IMPRESSION: Chronic changes. No evidence for intracranial hemorrhage  or new acute process.    CT c spine  IMPRESSION:  1. No evidence for fracture.  2. Multilevel degenerative disc and facet disease again noted.    Right shoulder: Advanced osteoarthritis at the glenohumeral joint.  Vascular calcifications. Multiple rib fractures described below. No  other acute bony abnormality.     Chest/right Ribs: No active cardiopulmonary disease. There are acute  displaced fractures of the posterior right third through seventh ribs  as well as the lateral right seventh rib and anterior lateral right  eighth and ninth ribs. The chest x-ray also shows slightly displaced  fractures of the left posterolateral fourth and fifth ribs. These may  be old and healed but were not definitely present on prior chest  x-rays.     AP pelvis: Interval placement of a left hip arthroplasty with  components in the expected location on the AP view. No acute appearing  bony abnormality. Extensive vascular calcifications.    CT c/a/p  IMPRESSION:  1. Right-sided third-eighth rib fractures, multiple ribs have anterior  and posterior components.  2. Otherwise no acute process demonstrated in the abdomen and pelvis.    CXR  IMPRESSION: Tip of the endotracheal tube is 5.5 cm above the masoud.  Nasogastric tube extends below the left hemidiaphragm. No pneumothorax  or pleural effusion. Mild interstitial abnormalities without gross  airspace consolidation.    Kain Rodriguez

## 2020-06-13 NOTE — CONSULTS
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 His exam is reassuring with sedation paused with him following at least one command and shaking his head no when I ask him to follow other commands. I do not suspect nonconvulsive status and no urgent action regarding likely provoked seizure is required overnight. As this is most likely a provoked seizure, we do not have to initiate anti-seizure medication overnight. The team will reassess in the morning and determine whether additional seizure work-up and/or seizure medication is required based on his exam. I do not see any obvious lesions on CT head that suggest he is vulnerable to lesional seizures and I have lower suspicion for idiopathic epilepsy based on current presentation.      Recommendations:  - Recommend management of provoking factors for seizures overnight   - No anti-seizure medication right now   - Notify me if there is concern for additional seizures and I will reassess   - Day team to direct further seizure work-up as appropriate      Patient was discussed with fellow, Dr. Ross. Staff is Dr. Joy.     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 Neurocritical care was consulted to discuss possibility of seizures and management.     History is obtained almost exclusively from chart and discussion with other providers as patient is obtained. I attempted to call [facility] ED to understand more about frequency and nature of his seizures prior to him being emergently intubated but provider had left for the day.     From chart, presented to ED for recurrent falls in setting of imbalance resulting in pain to right shoulder and chest. He was found to have multiple right sided rib fractures.  Leukocytosis 15.2 and UA with pyuria WBC > 182 and WBC clumps with few bacteria. Fever 100.8 F. While in ED, he became increasingly confused with tachycardia and \"shakes.\" He was given ativan with high CIWA concerning for alcohol withdrawal but proceeded to have multiple seizures requiring additional ativan. He became less responsive and ultimately intubated.     He has known history of alcohol abuse and told provider that he was cutting back some. His ethanol level was < 0.01 at [facility]. I see alcohol withdrawal seizure in his problem list but I cannot find a record document these episodes.     Past Medical/Surgery History:   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)     s/p chemo, no bone marrow transplant     Chronic obstructive pulmonary disease 5/17/2018     COPD (chronic obstructive pulmonary disease) (H)      History of pulmonary embolus (PE)      Hypertension      PUD (peptic ulcer disease)      Tobacco dependence      Ulcerative colitis (H)      Family History:   Family History   Problem Relation Age of Onset     Hypertension Mother      Coronary Artery Disease Father         MI     Social History:   Social " "History     Tobacco Use     Smoking status: Current Every Day Smoker     Packs/day: 0.50     Years: 30.00     Pack years: 15.00     Types: Cigarettes     Smokeless tobacco: Never Used     Tobacco comment: 5 cigarettes a day    Substance Use Topics     Alcohol use: Yes     Comment: Down to 3 beers per day.  Sometimes a cocktail also.     ROS: Review Of Systems - Cannot obtain secondary to mental status.     Vital Signs:  Vital signs:  Temp: 97.5  F (36.4  C) Temp src: Axillary BP: (!) 120/104 Pulse: 68 Heart Rate: 67 Resp: 23 SpO2: 100 % O2 Device: Mechanical Ventilator     Weight: 82.1 kg (181 lb)  Estimated body mass index is 29.21 kg/m  as calculated from the following:    Height as of an earlier encounter on 6/12/20: 1.676 m (5' 6\").    Weight as of this encounter: 82.1 kg (181 lb).    Physical Examination:  General: Intubated.   HEENT: Non traumatic   Pulmonary: Ventilated   Neuro:  MS: Sedation of propofol 10 mcg paused ~ 5 minutes. Transient sustained eye opening after manual opening. Shakes his head no x 2 when I ask him to follow commands but then follows commands to wiggle toes x 1. Follows no further commands.   CN: Pupils 4 mm bilateral, symmetric and briskly reactive.   Motor: No abnormal movements. Localizes to pain. Spontaneously moves all extremities, restless off sedation, no obvious asymmetry between sides.   Reflexes: Toes flexor. No clonus   Sensation: Deferred noxious in limbs as he was moving them.   Coordination: Does not perform due to mental status   Gait: Does not perform due to mental status     Labs/Studies:  Reviewed all labs in [facility].   Pertinent WBC 15.2 -> 28.5   UA WBC > 182 with WBC clumps, bacteria, large LE  Lactic acid 1.8 and 1.4     Imaging:  Initial Head CT 06/13/20  No acute pathology       "

## 2020-06-13 NOTE — PLAN OF CARE
Major Shift Events: Sedated w/ precedex. Unconscious/ moves all extremities when off sedation/ restless. Pupils 3- 4 mm equal/ reactive. 5+ BUE strength, 4+ BLE. No seizure activity noted since arrival. CMV setting FiO2 35%, 430/23/5. Lungs clear/dim bases. 1L LR given for low UOP per order. Chong w/ 30- 40 ml/hr UOP. L hip incision bruised at site/ 2 week old surgical site. R hip bruising post fall. OG to LIS with brown clear output- 20 ml. Phos+ & Mg+ replaced per protocol- recheck 1000/ 6/13.  Plan: titrate sedation/ continue monitor. Notify team w/ acute changes.   For vital signs and complete assessments, please see documentation flowsheets.

## 2020-06-13 NOTE — PHARMACY
"[facility], [place]   Antimicrobial Stewardship Team (AST) Gram-Negative Bacteremia Note              To:SICU  Unit: 4AB  Allergies   Allergen Reactions     Blood Transfusion Related (Informational Only)      Patient has a history of a clinically significant antibody against RBC antigens.  A delay in compatible RBCs may occur.     Famotidine Unknown and Other (See Comments)     Severe abdominal cramps     Cyclobenzaprine Hives     Methocarbamol Other (See Comments)     Made pt's \"face break out\"     Pepcid Cramps     Severe abdominal cramps     Vancomycin Other (See Comments)     hallucinations     Vfend Other (See Comments)     IV - cold sweats, Iron tablet makes him nauseated and stomach ached     Hydrocodone-Acetaminophen Rash       Positive blood culture resulted from Verigene  Organism identified: Enterobacter spp  Resistance gene detected: none    Brief Summary: Abhijeet Mccauley is a 59 year old man with PMH significant for recent hip replacement ~ 2 weeks ago, alcohol withdrawal seizures (per CareEverywhere), alcohol abuse, decompensated cirrhosis with esophageal varices and hx of hepatic encephalopathy, CKD, hx of demyelinating polyneuropathy s/p IVIG 3/2019, and hx of thiamine deficiency who presented to [facility], [place] after multiple falls, sustained R fib 3-8 fractures likely 2/2 seizures in setting of likely urinary tract infection or alcohol withdrawal. He was intubated at [facility] then transferred to [facility] for further cares.   1 of 2 peripheral blood cultures collected at [facility] prior to administration of antibiotics has resulted with GNR with Verigene detecting Enterobacter spp with no resistance markers for ESBL genes (CTXM, KPC, NDM, VIM, IMP and OXA). His UA showed susan, cloudy urine with negative nitrites, moderatre blood, large leukocyte esterase, >182 WBCs, and few bacteria. Urine culture is pending. CT demonstrates large superior pole calculus with caliectasis and " significant cortical atrophy.  The inferior pole, pelvis, and ureter are decompressed.  No ureteral or bladder stones. IR and urology have been consulted for possible left percutaneous nephrostomy tube.  Assessment: Enterobacter spp bacteremia presumably 2/2 obstructive left renal pelvis stone   Other than a recent Pantoea species from urine culture on 04/04/20, patient has a history of urine cultures positive for Enterobacter cloacae which has been resistant to 2nd and 3rd generation cephalosporins (cefazolin, ceftriaxone, cefoxitin, ceftazidime), fluoroquinolones. The cultures have remained susceptible to cefepime. The Enterobacter spp bacteremia could have resulted as a translocation from his renal pelvis stone after his falls. Enterobacter spp are known to have inducible AmpC beta-lactamase producing capabilities which confers resistance to the earlier generation cephalosporins (as in this patient's case), a low level of resistance to Zosyn but remains susceptible to cefepime due to its zwitterion molecular structure, and to carbapenems. Patient was initially on Zosyn, then ceftriaxone for a dose, and now on meropenem. Would continue meropenem for now, then switch to cefepime if susceptible. Carbapenems should ideally be preserved for MDROs when no other antibiotic therapy is feasible.  Recommendations:  1. Continue meropenem until susceptibilities return  2. Preferred therapy would be cefepime 2g IV q8h or q12h depending on renal function    Patient will be followed by Cally Payton PharmD (please contact if further questions)  AMT pager: [phone]      Vitals and other clinical features  Vitals       06/11 0700  -  06/12 0659 06/12 0700  -  06/13 0659 06/13 0700  -  06/13 1524   Most Recent    Temp ( F)   97.5 -  98.2    98 -  98.3     98.3 (36.8)    Pulse   64 -  80    64 -  110     110    Heart Rate   63 -  81    64 -  105     105    Resp   11 -  24      23     23    BP   87/49 -  137/67    102/58 -   150/91     150/91    SpO2 (%)   97 -  100    97 -  100     99          Current Antibiotics  Anti-infectives (From now, onward)    Start     Dose/Rate Route Frequency Ordered Stop    06/13/20 1200  meropenem (MERREM) 1 g vial to attach to  mL bag      1 g  over 30 Minutes Intravenous EVERY 8 HOURS 06/13/20 1158      06/13/20 0900  rifaximin (XIFAXAN) tablet 550 mg      550 mg Oral 2 TIMES DAILY 06/13/20 0831            Previous Antibiotics  Zosyn, ceftriaxone    Treatment Algorithm        Labs  Recent Labs   Lab Test 06/13/20  0443 06/12/20  2110 06/12/20  1005 05/30/20  0602 05/27/20  1243 05/22/20  0951 05/13/20  1319  04/23/20  1321  02/28/20  0533 02/27/20  1411   WBC 19.1* 28.5* 15.2*  --  16.1* 13.3* 8.0   < > 19.5*  --  7.3 10.1   ANEU  --  25.6* 11.9*  --   --  9.4*  --   --  14.1*  --  4.8 6.6   ALYM  --  0.4* 0.8  --   --  1.4  --   --  1.5  --  1.1 1.4   HECTOR  --  1.7* 2.1*  --   --  1.9*  --   --  3.3*  --  1.1 1.5*   AEOS  --  0.0 0.0  --   --  0.4  --   --  0.4  --  0.3 0.4   HGB 8.7* 8.3* 10.2* 7.6* 8.6* 8.1* 7.8*   < > 8.6*   < > 7.3* 5.8*   HCT 28.9* 27.4* 32.4*  --  28.4* 27.6* 26.1*   < > 29.7*  --  24.2* 20.7*   * 108* 104*  --  93 91 89   < > 93  --  84 83   * 158 201  --  156 165 150   < > 132*  --  90* 129*    < > = values in this interval not displayed.       Estimated Creatinine Clearance: 54.2 mL/min (A) (based on SCr of 1.44 mg/dL (H)).  Recent Labs   Lab Test 06/13/20 0443 06/12/20 2110 06/12/20  1005 05/30/20  0602 05/13/20  1319 04/23/20  1321   CR 1.44* 1.39* 1.39* 2.02* 2.27* 2.29*       Recent Labs   Lab Test 06/13/20 0443 06/12/20 2209 06/12/20 2110 06/12/20  1005 04/23/20  1321 03/17/20  0944 03/05/20  1019   BILITOTAL 1.7*  --  2.0* 1.9* 1.3 1.1 1.5*   ALKPHOS 353*  --  340* 464* 326* 292* 294*   ALBUMIN 2.3*  --  2.3* 2.9* 3.6 3.4 3.4   AST 30 Canceled, Test credited Canceled, Test credited 38 [phone] 38   ALT 14 16 16 19 20 19 23       Recent Labs   Lab  Test 06/12/20  2110 06/12/20  1005 03/15/19  1328 03/09/19  1001 10/10/18  0740   LACT 1.4 1.8  --  1.4  --    CRP  --   --  33.8* 24.4* 25.1*   SED  --   --  73*  --  55*       No lab results found.    Invalid input(s): AMIK    Culture results with specimen source  Recent Labs   Lab 06/12/20  1838 06/12/20  1520 06/12/20  1500   CULT Culture in progress Cultured on the 1st day of incubation:  Gram negative rods  *  Critical Value/Significant Value, preliminary result only, called to and read back by  Skylar Rodriges RN @ 1128. 06/13/20    (Note)  POSITIVE for ENTEROBACTER SPECIES by VIAP multiplex nucleic acid  test. Final identification and antimicrobial susceptibility testing  will be verified by standard methods.    Specimen tested with Verigene multiplex, gram-negative blood culture  nucleic acid test for the following targets: Acinetobacter sp.,  Citrobacter sp., Enterobacter sp., Proteus sp., E. coli, K.  pneumoniae/oxytoca, P. aeruginosa, and the following resistance  markers: CTXM, KPC, NDM, VIM, IMP and OXA.    Critical Value/Significant Value called to and read back by Ravin Toure  RN @ 1347. 06/13/20   No growth after 20 hours   SDES Midstream Urine Blood Left Arm Blood Left Arm       Urine Studies     Recent Labs   Lab Test 06/12/20  1837 05/12/20  1545 05/07/20  1519   URINEPH 5.0 6.0 5.0   LEUKEST Large* Large* Large*   WBCU >182* 25-50* 10-25*       CSF Testing  No lab results found.    Respiratory Virus Testing    No results found for: RS, FLUAG    Last check of C difficile  No results found for: CDBPCT    Imaging:  Ct Chest/abdomen/pelvis W Contrast    Result Date: 6/12/2020  CT CHEST, ABDOMEN, AND PELVIS WITH CONTRAST 6/12/2020 1:33 PM HISTORY: Fall; multiple rib fractures. COMPARISON: October 3, 2019 TECHNIQUE: Volumetric helical acquisition of CT images from the lung apices through the symphysis pubis after the administration of 90 mL Isovue 370 intravenous contrast. Radiation dose for  this scan was reduced using automated exposure control, adjustment of the mA and/or kV according to patient size, or iterative reconstruction technique. FINDINGS: Chest: Fractures of the right third, fourth, fifth, sixth, seventh, and eighth ribs on the right noted. Multiple rib fractures have posterior and anterolateral components. No definite pneumothorax. Trace peripheral atelectasis and/or fibrosis. Trace atelectasis and/or fibrosis on the right. Left lung clear. No definite left-sided fractures. Extensive gastroesophageal varices. Abdomen and pelvis: Cirrhotic liver morphology. Multiple portal systemic collaterals. No definite splenomegaly. Cholelithiasis without cholecystitis. Adrenal glands, pancreas, and spleen are unremarkable. Some atrophy and a large calcification seen in the left kidney measuring up to 1.5 cm. Right kidney unremarkable. No hydronephrosis. There are moderate atherosclerotic changes of the visualized aorta and its branches. There is no evidence of aortic dissection or aneurysm. There are no dilated loops of small intestine or large bowel to suggest ileus or obstruction. No frankly destructive bony lesions.     IMPRESSION: 1. Right-sided third-eighth rib fractures, multiple ribs have anterior and posterior components. 2. Otherwise no acute process demonstrated in the abdomen and pelvis. MAUREEN BERNABE MD    Xr Chest Port 1 View    Result Date: 6/12/2020  CHEST ONE VIEW PORTABLE   6/12/2020 4:49 PM HISTORY: Post intubation. COMPARISON: None.     IMPRESSION: Tip of the endotracheal tube is 5.5 cm above the masoud. Nasogastric tube extends below the left hemidiaphragm. No pneumothorax or pleural effusion. Mild interstitial abnormalities without gross airspace consolidation. MAGGY KAMINSKI MD    Xr Pelvis 1/2 Views    Result Date: 6/12/2020  SHOULDER TWO VIEWS RIGHT, PELVIS ONE TO TWO VIEWS, RIBS AND CHEST RIGHT THREE VIEWS June 12, 2020 11:26 AM HISTORY: Right rib pain, right shoulder pain  and hip pain after a fall. COMPARISON: AP pelvis 12/5/2019. Two view chest 8/28/2019.     IMPRESSION: Right shoulder: Advanced osteoarthritis at the glenohumeral joint. Vascular calcifications. Multiple rib fractures described below. No other acute bony abnormality. Chest/right Ribs: No active cardiopulmonary disease. There are acute displaced fractures of the posterior right third through seventh ribs as well as the lateral right seventh rib and anterior lateral right eighth and ninth ribs. The chest x-ray also shows slightly displaced fractures of the left posterolateral fourth and fifth ribs. These may be old and healed but were not definitely present on prior chest x-rays. AP pelvis: Interval placement of a left hip arthroplasty with components in the expected location on the AP view. No acute appearing bony abnormality. Extensive vascular calcifications. AKIN DORANTES MD    Ribs Xr, Unilat 3 Views + Pa Chest, Right    Result Date: 6/12/2020  SHOULDER TWO VIEWS RIGHT, PELVIS ONE TO TWO VIEWS, RIBS AND CHEST RIGHT THREE VIEWS June 12, 2020 11:26 AM HISTORY: Right rib pain, right shoulder pain and hip pain after a fall. COMPARISON: AP pelvis 12/5/2019. Two view chest 8/28/2019.     IMPRESSION: Right shoulder: Advanced osteoarthritis at the glenohumeral joint. Vascular calcifications. Multiple rib fractures described below. No other acute bony abnormality. Chest/right Ribs: No active cardiopulmonary disease. There are acute displaced fractures of the posterior right third through seventh ribs as well as the lateral right seventh rib and anterior lateral right eighth and ninth ribs. The chest x-ray also shows slightly displaced fractures of the left posterolateral fourth and fifth ribs. These may be old and healed but were not definitely present on prior chest x-rays. AP pelvis: Interval placement of a left hip arthroplasty with components in the expected location on the AP view. No acute appearing bony  abnormality. Extensive vascular calcifications. AKIN DORANTES MD    Shoulder Xr, 2 View, Right    Result Date: 6/12/2020  SHOULDER TWO VIEWS RIGHT, PELVIS ONE TO TWO VIEWS, RIBS AND CHEST RIGHT THREE VIEWS June 12, 2020 11:26 AM HISTORY: Right rib pain, right shoulder pain and hip pain after a fall. COMPARISON: AP pelvis 12/5/2019. Two view chest 8/28/2019.     IMPRESSION: Right shoulder: Advanced osteoarthritis at the glenohumeral joint. Vascular calcifications. Multiple rib fractures described below. No other acute bony abnormality. Chest/right Ribs: No active cardiopulmonary disease. There are acute displaced fractures of the posterior right third through seventh ribs as well as the lateral right seventh rib and anterior lateral right eighth and ninth ribs. The chest x-ray also shows slightly displaced fractures of the left posterolateral fourth and fifth ribs. These may be old and healed but were not definitely present on prior chest x-rays. AP pelvis: Interval placement of a left hip arthroplasty with components in the expected location on the AP view. No acute appearing bony abnormality. Extensive vascular calcifications. AKIN DORANTES MD    Ct Cervical Spine W/o Contrast    Result Date: 6/12/2020  CT CERVICAL SPINE WITHOUT CONTRAST   6/12/2020 10:54 AM HISTORY: Fall.  TECHNIQUE: Axial images of the cervical spine were obtained without intravenous contrast. Multiplanar reformations were performed. Radiation dose for this scan was reduced using automated exposure control, adjustment of the mA and/or kV according to patient size, or iterative reconstruction technique.  COMPARISON: 2/25/2019. FINDINGS: Sagittal reconstructions demonstrate minimal chronic degenerative spondylolisthesis of C2 on C3, otherwise normal posterior alignment. There is no evidence for fracture. Multilevel degenerative disc and facet disease is present with moderate to severe central canal stenoses at C5-C6 and C6-C7. There is also  moderate to severe bilateral neural foraminal stenoses at several levels. Extensive vascular calcifications noted.     IMPRESSION: 1. No evidence for fracture. 2. Multilevel degenerative disc and facet disease again noted. ABY WADDELL MD    Ct Head W/o Contrast    Result Date: 6/12/2020  CT SCAN OF THE HEAD WITHOUT CONTRAST   6/12/2020 10:53 AM HISTORY:  Fall. TECHNIQUE:  Axial images of the head and coronal reformations without IV contrast material.  Radiation dose for this scan was reduced using automated exposure control, adjustment of the mA and/or kV according to patient size, or iterative reconstruction technique. COMPARISON: 2/25/2019 FINDINGS: There is some cerebral atrophy. Minimal nonspecific white matter changes without mass effect are present. There is no evidence for intracranial hemorrhage, mass effect, acute infarct, or skull fracture. Visualized paranasal sinuses and mastoid air cells are clear. Vascular calcifications noted.     IMPRESSION: Chronic changes. No evidence for intracranial hemorrhage or new acute process. ABY WADDELL MD

## 2020-06-13 NOTE — PLAN OF CARE
OT 4A Cx Pt not medically appropriate this am, PT to initiate first. Will reschedule OT evaluation.

## 2020-06-13 NOTE — ED NOTES
"[facility]   ED Nurse to Floor Handoff     Abhijeet Mccauley is a 59 year old male who speaks English and lives with family members,  in a home  They arrived in the ED by ambulance from [facility] ED    ED Chief Complaint: Trauma    ED Dx;   Final diagnoses:   Alcohol withdrawal syndrome with complication (H)         Needed?: No    Allergies:   Allergies   Allergen Reactions     Blood Transfusion Related (Informational Only)      Patient has a history of a clinically significant antibody against RBC antigens.  A delay in compatible RBCs may occur.     Famotidine Unknown and Other (See Comments)     Severe abdominal cramps     Cyclobenzaprine Hives     Methocarbamol Other (See Comments)     Made pt's \"face break out\"     Pepcid Cramps     Severe abdominal cramps     Vancomycin Other (See Comments)     hallucinations     Vfend Other (See Comments)     IV - cold sweats, Iron tablet makes him nauseated and stomach ached     Hydrocodone-Acetaminophen Rash   .  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)     s/p chemo, no bone marrow transplant     Chronic obstructive pulmonary disease 5/17/2018     COPD (chronic obstructive pulmonary disease) (H)      History of pulmonary embolus (PE)      Hypertension      PUD (peptic ulcer disease)      Tobacco dependence      Ulcerative colitis (H)       Baseline Mental status: WDL  Current Mental Status changes: other sedated for airway management via mechanical ventilation    Infection present or suspected this encounter: yes urinary  Sepsis suspected: No  Isolation type: No active isolations     Activity level - Baseline/Home:  Independent  Activity Level - Current:   Unknown    Bariatric equipment needed?: No    In the ED these meds were given:   Medications "   propofol (DIPRIVAN) infusion (5 mcg/kg/min × 83.9 kg Intravenous New Bag 6/12/20 2048)   fentaNYL (PF) (SUBLIMAZE) injection 25 mcg (25 mcg Intravenous Given 6/12/20 2056)       Drips running?  Yes    Home pump  No    Current LDAs  Peripheral IV 06/12/20 Right Wrist (Active)   Site Assessment WDL 06/12/20 2027   Line Status Infusing 06/12/20 2027   Phlebitis Scale 0-->no symptoms 06/12/20 2027   Infiltration Scale 0 06/12/20 2027   Number of days: 0       Peripheral IV 06/12/20 Left Lower forearm (Active)   Site Assessment WDL 06/12/20 2027   Line Status Infusing 06/12/20 2027   Phlebitis Scale 0-->no symptoms 06/12/20 2027   Infiltration Scale 0 06/12/20 2027   Number of days: 0       Airway - Adult/Peds 7.5 endotracheal tube (Active)   Site Appearance Other (Comment) 06/12/20 2028   Secured By Commercial tube coronel 06/12/20 2028   Secured at (cm) to lip 24 cm 06/12/20 2028   Cuff Pressure - Type minimal occluding volume 06/12/20 2028   Bite Block Secure and Patent 06/12/20 2028   Safety Measures manual resuscitator at bedside 06/12/20 2028   Number of days: 0       NG/OG/NJ Tube Orogastric 18 fr Left mouth (Active)   Site Description WDL 06/12/20 2029   Status Clamped 06/12/20 2029   Drainage Appearance Brown 06/12/20 2029   Placement Confirmation [place] unchanged 06/12/20 2029   [place] (cm marking) at nare/mouth 50 cm 06/12/20 2029   Number of days: 0       Urethral Catheter Non-latex (Active)   Collection Container Standard 06/12/20 2027   Securement Method Securing device (Describe) 06/12/20 2027   Rationale for Continued Use Strict 1-2 Hour I&O 06/12/20 2027   Number of days: 0       Incision/Surgical Site 05/29/20 Left Hip (Active)   Incision Assessment WDL 06/12/20 2026   Joceline-Incision Assessment Ecchymosis 06/12/20 2026   Closure Sutures 06/12/20 2026   Incision Drainage Amount None 06/12/20 2026   Dressing Intervention Clean, dry, intact 06/12/20 2026   Number of days: 14       Labs results:   Labs  Ordered and Resulted from Time of ED Arrival Up to the Time of Departure from the ED   LACTIC ACID WHOLE BLOOD   BLOOD GAS VENOUS   CBC WITH PLATELETS DIFFERENTIAL   INR   PARTIAL THROMBOPLASTIN TIME   COMPREHENSIVE METABOLIC PANEL   AMMONIA   ALCOHOL ETHYL       Imaging Studies:   Recent Results (from the past 24 hour(s))   CT Head w/o Contrast    Narrative    CT SCAN OF THE HEAD WITHOUT CONTRAST   6/12/2020 10:53 AM     HISTORY:  Fall.    TECHNIQUE:  Axial images of the head and coronal reformations without  IV contrast material.  Radiation dose for this scan was reduced using  automated exposure control, adjustment of the mA and/or kV according  to patient size, or iterative reconstruction technique.    COMPARISON: 2/25/2019    FINDINGS: There is some cerebral atrophy. Minimal nonspecific white  matter changes without mass effect are present. There is no evidence  for intracranial hemorrhage, mass effect, acute infarct, or skull  fracture. Visualized paranasal sinuses and mastoid air cells are  clear. Vascular calcifications noted.      Impression    IMPRESSION: Chronic changes. No evidence for intracranial hemorrhage  or new acute process.    ABY WADDELL MD   CT Cervical Spine w/o Contrast    Narrative    CT CERVICAL SPINE WITHOUT CONTRAST   6/12/2020 10:54 AM     HISTORY: Fall.     TECHNIQUE: Axial images of the cervical spine were obtained without  intravenous contrast. Multiplanar reformations were performed.  Radiation dose for this scan was reduced using automated exposure  control, adjustment of the mA and/or kV according to patient size, or  iterative reconstruction technique.     COMPARISON: 2/25/2019.    FINDINGS: Sagittal reconstructions demonstrate minimal chronic  degenerative spondylolisthesis of C2 on C3, otherwise normal posterior  alignment. There is no evidence for fracture. Multilevel degenerative  disc and facet disease is present with moderate to severe central  canal stenoses at C5-C6 and  C6-C7. There is also moderate to severe  bilateral neural foraminal stenoses at several levels. Extensive  vascular calcifications noted.      Impression    IMPRESSION:  1. No evidence for fracture.  2. Multilevel degenerative disc and facet disease again noted.    ABY WADDELL MD   Shoulder XR, 2 view, right    Narrative    SHOULDER TWO VIEWS RIGHT, PELVIS ONE TO TWO VIEWS, RIBS AND CHEST  RIGHT THREE VIEWS June 12, 2020 11:26 AM     HISTORY: Right rib pain, right shoulder pain and hip pain after a  fall.    COMPARISON: AP pelvis 12/5/2019. Two view chest 8/28/2019.      Impression    IMPRESSION:     Right shoulder: Advanced osteoarthritis at the glenohumeral joint.  Vascular calcifications. Multiple rib fractures described below. No  other acute bony abnormality.    Chest/right Ribs: No active cardiopulmonary disease. There are acute  displaced fractures of the posterior right third through seventh ribs  as well as the lateral right seventh rib and anterior lateral right  eighth and ninth ribs. The chest x-ray also shows slightly displaced  fractures of the left posterolateral fourth and fifth ribs. These may  be old and healed but were not definitely present on prior chest  x-rays.    AP pelvis: Interval placement of a left hip arthroplasty with  components in the expected location on the AP view. No acute appearing  bony abnormality. Extensive vascular calcifications.    AKIN DORANTES MD   XR Pelvis 1/2 Views    Narrative    SHOULDER TWO VIEWS RIGHT, PELVIS ONE TO TWO VIEWS, RIBS AND CHEST  RIGHT THREE VIEWS June 12, 2020 11:26 AM     HISTORY: Right rib pain, right shoulder pain and hip pain after a  fall.    COMPARISON: AP pelvis 12/5/2019. Two view chest 8/28/2019.      Impression    IMPRESSION:     Right shoulder: Advanced osteoarthritis at the glenohumeral joint.  Vascular calcifications. Multiple rib fractures described below. No  other acute bony abnormality.    Chest/right Ribs: No active  cardiopulmonary disease. There are acute  displaced fractures of the posterior right third through seventh ribs  as well as the lateral right seventh rib and anterior lateral right  eighth and ninth ribs. The chest x-ray also shows slightly displaced  fractures of the left posterolateral fourth and fifth ribs. These may  be old and healed but were not definitely present on prior chest  x-rays.    AP pelvis: Interval placement of a left hip arthroplasty with  components in the expected location on the AP view. No acute appearing  bony abnormality. Extensive vascular calcifications.    AKIN DORANTES MD   Ribs XR, unilat 3 views + PA chest, right    Narrative    SHOULDER TWO VIEWS RIGHT, PELVIS ONE TO TWO VIEWS, RIBS AND CHEST  RIGHT THREE VIEWS June 12, 2020 11:26 AM     HISTORY: Right rib pain, right shoulder pain and hip pain after a  fall.    COMPARISON: AP pelvis 12/5/2019. Two view chest 8/28/2019.      Impression    IMPRESSION:     Right shoulder: Advanced osteoarthritis at the glenohumeral joint.  Vascular calcifications. Multiple rib fractures described below. No  other acute bony abnormality.    Chest/right Ribs: No active cardiopulmonary disease. There are acute  displaced fractures of the posterior right third through seventh ribs  as well as the lateral right seventh rib and anterior lateral right  eighth and ninth ribs. The chest x-ray also shows slightly displaced  fractures of the left posterolateral fourth and fifth ribs. These may  be old and healed but were not definitely present on prior chest  x-rays.    AP pelvis: Interval placement of a left hip arthroplasty with  components in the expected location on the AP view. No acute appearing  bony abnormality. Extensive vascular calcifications.    AKIN DORANTES MD   CT Chest/Abdomen/Pelvis w Contrast    Narrative    CT CHEST, ABDOMEN, AND PELVIS WITH CONTRAST 6/12/2020 1:33 PM     HISTORY: Fall; multiple rib fractures.    COMPARISON: October 3,  2019    TECHNIQUE: Volumetric helical acquisition of CT images from the lung  apices through the symphysis pubis after the administration of 90 mL  Isovue 370 intravenous contrast. Radiation dose for this scan was  reduced using automated exposure control, adjustment of the mA and/or  kV according to patient size, or iterative reconstruction technique.    FINDINGS:   Chest: Fractures of the right third, fourth, fifth, sixth, seventh,  and eighth ribs on the right noted. Multiple rib fractures have  posterior and anterolateral components. No definite pneumothorax.  Trace peripheral atelectasis and/or fibrosis. Trace atelectasis and/or  fibrosis on the right. Left lung clear. No definite left-sided  fractures. Extensive gastroesophageal varices.    Abdomen and pelvis: Cirrhotic liver morphology. Multiple portal  systemic collaterals. No definite splenomegaly. Cholelithiasis without  cholecystitis. Adrenal glands, pancreas, and spleen are unremarkable.  Some atrophy and a large calcification seen in the left kidney  measuring up to 1.5 cm. Right kidney unremarkable. No hydronephrosis.  There are moderate atherosclerotic changes of the visualized aorta and  its branches. There is no evidence of aortic dissection or aneurysm.  There are no dilated loops of small intestine or large bowel to  suggest ileus or obstruction.    No frankly destructive bony lesions.      Impression    IMPRESSION:  1. Right-sided third-eighth rib fractures, multiple ribs have anterior  and posterior components.  2. Otherwise no acute process demonstrated in the abdomen and pelvis.    MAUREEN BERNABE MD   XR Chest Port 1 View    Narrative    CHEST ONE VIEW PORTABLE   6/12/2020 4:49 PM     HISTORY: Post intubation.    COMPARISON: None.      Impression    IMPRESSION: Tip of the endotracheal tube is 5.5 cm above the masoud.  Nasogastric tube extends below the left hemidiaphragm. No pneumothorax  or pleural effusion. Mild interstitial abnormalities  without gross  airspace consolidation.    MAGGY KAMINSKI MD       Recent vital signs:   /64   Pulse 77   Temp 98.2  F (36.8  C) (Axillary)   Resp 19   SpO2 100%     Murphy Coma Scale Score: 3 (06/12/20 2034)  Assessment Qualifiers: patient chemically sedated or paralyzed;patient intubated    Cardiac Rhythm: Normal Sinus  Pt needs tele? Yes  Skin/wound Issues: surgical incision to left lateral hip 05/29/2020    Code Status: not documented    Pain control: good    Nausea control: pt had none    Abnormal labs/tests/findings requiring intervention: Multiple lateral and posterlateral fractures to ribs on right and left side from 3-9; UTI; seizure activity at previous ED well controlled with Propofol and Ativan given PTA    Family present during ED course? No   Family Comments/Social Situation comments: It is reported to me by EMS that pt lives in his sister's home.  She was notified of his transfer to this facility from [facility] by staff there.  It was reported to me that they have an estranged relationship.     Tasks needing completion: None    Monse Coelho, RN    [phone] [facility]

## 2020-06-13 NOTE — ED TRIAGE NOTES
Pt presents via EMS from [facility] following a traumatic fall in his garage.  Pt reportedly had a left OSCAR on 6/5/2020.  EMS reports that he was found on garage floor by home health nurse.  He was intubated at [facility] due to seizures and rib fx.  He arrives sedated on propofol drip.  EMS reports a low blood pressure enroute sop they gave a bolus of 1L IVF.  85 systolic on arrival, another bolus started.

## 2020-06-13 NOTE — ED NOTES
Pt now febrile, he is more tremulous along with increased confusion.  Increased withdrawal s/s are reported to Dr. Gastelum.

## 2020-06-13 NOTE — PROGRESS NOTES
Trauma Surgery Progress Note  06/13/2020       Subjective:  - TMax 100.8 in last 24 hours  - Remains intubated and sedated  - Blood culture positive, speciation and sensitivities pending     Objective:  Temp:  [97.5  F (36.4  C)-100.8  F (38.2  C)] 98.3  F (36.8  C)  Pulse:  [64-96] 72  Heart Rate:  [] 72  Resp:  [8-25] 23  BP: ()/() 123/61  FiO2 (%):  [35 %-50 %] 35 %  SpO2:  [95 %-100 %] 100 %    I/O last 3 completed shifts:  In: 2404.29 [I.V.:1404.29; IV Piggyback:1000]  Out: 320 [Urine:320]      Intubated, sedated  Reacts and withdraws to stimuli  Left thigh dressing in place  Extremities warm and well perfused     Labs:  Recent Labs   Lab 06/13/20 0443 06/12/20 2110 06/12/20  1005   WBC 19.1* 28.5* 15.2*   HGB 8.7* 8.3* 10.2*   * 158 201       Recent Labs   Lab 06/13/20  0829 06/13/20 0443 06/12/20  2209 06/12/20 2110 06/12/20  1005   NA  --  141  --  141 138   POTASSIUM  --  3.4  --  3.7 3.8   CHLORIDE  --  114*  --  113* 106   CO2  --  18*  --  18* 25   BUN  --  16  --  15 17   CR  --  1.44*  --  1.39* 1.39*   GLC  --  134*  --  79 121*   BEE  --  7.7*  --  7.5* 9.1   MAG 2.5* 1.6 1.7  --   --    PHOS 2.4* 1.7* 1.2*  --   --        Assessment/Plan:   59 year old male who underwent left hip replacement 2 weeks ago, found down in garage by home nurse, transferred to [facility] from [facility] after he became febrile, had multiple seizures, became less response, CIWA 26, subsequently intubated. Found to have multiple posterior left rib fractures, concerns for urosepsis, concerns for possible EtOH withdrawal  .    - Continue antibiotics  - Appreciate ICU team cares  - Appreciate neuro cares  - Will need tertiary exam when extubated, able to participate in exam     Seen, examined, and discussed with chief resident, who will discuss with staff.  - - - - - - - - - - - - - - - - - -  Amol Baig MD MS  Urology PGY-1

## 2020-06-13 NOTE — ED NOTES
Charge RN updated, pt is in severed withdrawal.  Intervention is needed.  Dr. Gastelum has been notified by this RN several times.

## 2020-06-13 NOTE — ED NOTES
CIWA scale 26, severe withdrawal reported to dr. Gastelum.  Pt is now 1:1.  He is agitated.  He is hallucinating.

## 2020-06-13 NOTE — H&P
SURGICAL ICU ADMISSION NOTE  6/12/2020    PRIMARY TEAM: Trauma   PRIMARY PHYSICIAN: Dr. Walls     REASON FOR CRITICAL CARE ADMISSION: Intubated   ADMITTING PHYSICIAN: Dr. Dodson     H&P obtained from chart, as patient is intubated and sedated     HISTORY PRESENTING ILLNESS: This is a 59 year old male with PMH inflammatory demyelinating polyneuropathy, UC, alcohol abuse, THC, COPD, CKD, liver cirrhosis, MELD score 16, esophageal varices, recent history of R hip replacement 2 weeks ago  Was transferred from [facility] after he presented this morning following a fall, per EMR he had multiple falls since his surgery and feels unstable, he was noted to also be intermittently confused, he underwent CT head/cervical spine/ chest/abd/pelvis, these were negative for acute changes except for R 3-8 rib fx, wbc 28, UA was positive, cardiac workup including troponin and EKG were wnl, ETOH not detected, tested positive for THC, per notes it seems that he was still actively drinking following his surgery. He was intubated in the [facility] for seizure activity and was given ativan, started on propofol gtt, he was transferred here for further cares.    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    PAST SURGICAL HISTORY:   has a past surgical history that includes Esophagoscopy, gastroscopy, duodenoscopy (EGD), combined (N/A, 2/8/2018); TOTAL KNEE ARTHROPLASTY (Left); Laceration repair (Right); Esophagoscopy, gastroscopy, duodenoscopy (EGD), combined (N/A, 3/18/2018); Bone marrow biopsy, bone specimen, needle/trocar (N/A, 6/12/2018);  "Phacoemulsification with standard intraocular lens implant (Left, 7/1/2019); Phacoemulsification with standard intraocular lens implant (Right, 7/29/2019); Esophagoscopy, gastroscopy, duodenoscopy (EGD), combined (N/A, 2/28/2020); and Arthroplasty hip (Left, 5/29/2020).    FAMILY HISTORY:  family history includes Coronary Artery Disease in his father; Hypertension in his mother.    SOCIAL HISTORY:  smoking cigarettes. He has a 15.00 pack-year smoking history. He has never used smokeless tobacco. He reports current alcohol use. He reports current drug use. Drug: Marijuana.    ALLERGIES:  Allergies   Allergen Reactions     Blood Transfusion Related (Informational Only)      Patient has a history of a clinically significant antibody against RBC antigens.  A delay in compatible RBCs may occur.     Famotidine Unknown and Other (See Comments)     Severe abdominal cramps     Cyclobenzaprine Hives     Methocarbamol Other (See Comments)     Made pt's \"face break out\"     Pepcid Cramps     Severe abdominal cramps     Vancomycin Other (See Comments)     hallucinations     Vfend Other (See Comments)     IV - cold sweats, Iron tablet makes him nauseated and stomach ached     Hydrocodone-Acetaminophen Rash       MEDICATIONS:  (Not in a hospital admission)      PHYSICAL EXAMINATION:  Temp:  [98.1  F (36.7  C)-100.8  F (38.2  C)] 98.2  F (36.8  C)  Pulse:  [77-96] 77  Heart Rate:  [] 79  Resp:  [8-29] 19  BP: ()/() 106/64  FiO2 (%):  [35 %-50 %] 35 %  SpO2:  [95 %-100 %] 100 %    Sedated on propofol, grimaces with ear exam  Pupils are 3 mm and reactive to light bilaterally   Intubated, non labored breathing   Non cyanotic  Left shoulder bruise   Soft abdomen, non distended, no surgical scars   No BLE edema    LABS:  Arterial Blood Gases   No lab results found in last 7 days.  Complete Blood Count   Recent Labs   Lab 06/12/20  1005   WBC 15.2*   HGB 10.2*        Basic Metabolic Panel  Recent Labs   Lab " 06/12/20  1005      POTASSIUM 3.8   CHLORIDE 106   CO2 25   BUN 17   CR 1.39*   *     Liver Function Tests  Recent Labs   Lab 06/12/20  1005   AST 38   ALT 19   ALKPHOS 464*   BILITOTAL 1.9*   ALBUMIN 2.9*   INR 1.47*     Pancreatic Enzymes  Recent Labs   Lab 06/12/20  1005   LIPASE 192     Coagulation Profile  Recent Labs   Lab 06/12/20  1005   INR 1.47*   PTT 37     Lactate  Invalid input(s): LACTATE    IMAGING:  Results for orders placed or performed during the hospital encounter of 06/12/20   CT Head w/o Contrast    Narrative    CT SCAN OF THE HEAD WITHOUT CONTRAST   6/12/2020 10:53 AM     HISTORY:  Fall.    TECHNIQUE:  Axial images of the head and coronal reformations without  IV contrast material.  Radiation dose for this scan was reduced using  automated exposure control, adjustment of the mA and/or kV according  to patient size, or iterative reconstruction technique.    COMPARISON: 2/25/2019    FINDINGS: There is some cerebral atrophy. Minimal nonspecific white  matter changes without mass effect are present. There is no evidence  for intracranial hemorrhage, mass effect, acute infarct, or skull  fracture. Visualized paranasal sinuses and mastoid air cells are  clear. Vascular calcifications noted.      Impression    IMPRESSION: Chronic changes. No evidence for intracranial hemorrhage  or new acute process.    ABY WADDELL MD   CT Cervical Spine w/o Contrast    Narrative    CT CERVICAL SPINE WITHOUT CONTRAST   6/12/2020 10:54 AM     HISTORY: Fall.     TECHNIQUE: Axial images of the cervical spine were obtained without  intravenous contrast. Multiplanar reformations were performed.  Radiation dose for this scan was reduced using automated exposure  control, adjustment of the mA and/or kV according to patient size, or  iterative reconstruction technique.     COMPARISON: 2/25/2019.    FINDINGS: Sagittal reconstructions demonstrate minimal chronic  degenerative spondylolisthesis of C2 on C3,  otherwise normal posterior  alignment. There is no evidence for fracture. Multilevel degenerative  disc and facet disease is present with moderate to severe central  canal stenoses at C5-C6 and C6-C7. There is also moderate to severe  bilateral neural foraminal stenoses at several levels. Extensive  vascular calcifications noted.      Impression    IMPRESSION:  1. No evidence for fracture.  2. Multilevel degenerative disc and facet disease again noted.    ABY WADDELL MD   Shoulder XR, 2 view, right    Narrative    SHOULDER TWO VIEWS RIGHT, PELVIS ONE TO TWO VIEWS, RIBS AND CHEST  RIGHT THREE VIEWS June 12, 2020 11:26 AM     HISTORY: Right rib pain, right shoulder pain and hip pain after a  fall.    COMPARISON: AP pelvis 12/5/2019. Two view chest 8/28/2019.      Impression    IMPRESSION:     Right shoulder: Advanced osteoarthritis at the glenohumeral joint.  Vascular calcifications. Multiple rib fractures described below. No  other acute bony abnormality.    Chest/right Ribs: No active cardiopulmonary disease. There are acute  displaced fractures of the posterior right third through seventh ribs  as well as the lateral right seventh rib and anterior lateral right  eighth and ninth ribs. The chest x-ray also shows slightly displaced  fractures of the left posterolateral fourth and fifth ribs. These may  be old and healed but were not definitely present on prior chest  x-rays.    AP pelvis: Interval placement of a left hip arthroplasty with  components in the expected location on the AP view. No acute appearing  bony abnormality. Extensive vascular calcifications.    AKIN DORANTES MD   Ribs XR, unilat 3 views + PA chest, right    Narrative    SHOULDER TWO VIEWS RIGHT, PELVIS ONE TO TWO VIEWS, RIBS AND CHEST  RIGHT THREE VIEWS June 12, 2020 11:26 AM     HISTORY: Right rib pain, right shoulder pain and hip pain after a  fall.    COMPARISON: AP pelvis 12/5/2019. Two view chest 8/28/2019.      Impression    IMPRESSION:      Right shoulder: Advanced osteoarthritis at the glenohumeral joint.  Vascular calcifications. Multiple rib fractures described below. No  other acute bony abnormality.    Chest/right Ribs: No active cardiopulmonary disease. There are acute  displaced fractures of the posterior right third through seventh ribs  as well as the lateral right seventh rib and anterior lateral right  eighth and ninth ribs. The chest x-ray also shows slightly displaced  fractures of the left posterolateral fourth and fifth ribs. These may  be old and healed but were not definitely present on prior chest  x-rays.    AP pelvis: Interval placement of a left hip arthroplasty with  components in the expected location on the AP view. No acute appearing  bony abnormality. Extensive vascular calcifications.    AKIN DORANTES MD   XR Pelvis 1/2 Views    Narrative    SHOULDER TWO VIEWS RIGHT, PELVIS ONE TO TWO VIEWS, RIBS AND CHEST  RIGHT THREE VIEWS June 12, 2020 11:26 AM     HISTORY: Right rib pain, right shoulder pain and hip pain after a  fall.    COMPARISON: AP pelvis 12/5/2019. Two view chest 8/28/2019.      Impression    IMPRESSION:     Right shoulder: Advanced osteoarthritis at the glenohumeral joint.  Vascular calcifications. Multiple rib fractures described below. No  other acute bony abnormality.    Chest/right Ribs: No active cardiopulmonary disease. There are acute  displaced fractures of the posterior right third through seventh ribs  as well as the lateral right seventh rib and anterior lateral right  eighth and ninth ribs. The chest x-ray also shows slightly displaced  fractures of the left posterolateral fourth and fifth ribs. These may  be old and healed but were not definitely present on prior chest  x-rays.    AP pelvis: Interval placement of a left hip arthroplasty with  components in the expected location on the AP view. No acute appearing  bony abnormality. Extensive vascular calcifications.    AKIN DORANTES MD   CT  Chest/Abdomen/Pelvis w Contrast    Narrative    CT CHEST, ABDOMEN, AND PELVIS WITH CONTRAST 6/12/2020 1:33 PM     HISTORY: Fall; multiple rib fractures.    COMPARISON: October 3, 2019    TECHNIQUE: Volumetric helical acquisition of CT images from the lung  apices through the symphysis pubis after the administration of 90 mL  Isovue 370 intravenous contrast. Radiation dose for this scan was  reduced using automated exposure control, adjustment of the mA and/or  kV according to patient size, or iterative reconstruction technique.    FINDINGS:   Chest: Fractures of the right third, fourth, fifth, sixth, seventh,  and eighth ribs on the right noted. Multiple rib fractures have  posterior and anterolateral components. No definite pneumothorax.  Trace peripheral atelectasis and/or fibrosis. Trace atelectasis and/or  fibrosis on the right. Left lung clear. No definite left-sided  fractures. Extensive gastroesophageal varices.    Abdomen and pelvis: Cirrhotic liver morphology. Multiple portal  systemic collaterals. No definite splenomegaly. Cholelithiasis without  cholecystitis. Adrenal glands, pancreas, and spleen are unremarkable.  Some atrophy and a large calcification seen in the left kidney  measuring up to 1.5 cm. Right kidney unremarkable. No hydronephrosis.  There are moderate atherosclerotic changes of the visualized aorta and  its branches. There is no evidence of aortic dissection or aneurysm.  There are no dilated loops of small intestine or large bowel to  suggest ileus or obstruction.    No frankly destructive bony lesions.      Impression    IMPRESSION:  1. Right-sided third-eighth rib fractures, multiple ribs have anterior  and posterior components.  2. Otherwise no acute process demonstrated in the abdomen and pelvis.    MAUREEN BERNABE MD   XR Chest Port 1 View    Narrative    CHEST ONE VIEW PORTABLE   6/12/2020 4:49 PM     HISTORY: Post intubation.    COMPARISON: None.      Impression    IMPRESSION: Tip of  the endotracheal tube is 5.5 cm above the masoud.  Nasogastric tube extends below the left hemidiaphragm. No pneumothorax  or pleural effusion. Mild interstitial abnormalities without gross  airspace consolidation.    MAGGY KAMINSKI MD       ASSESSMENT: Abhijeet Mccauley is a 59 year old male with hx of alcohol abuse, liver cirrhosis MELD score 16, presents after multiple falls, sustained R rib 3-8 fx, UA was positive, intubated in [facility] for seizures.    PLAN:   Neuro/ pain/ sedation:        ? Monitor neurological status. Notify the MD for any acute changes in exam.        ? fenatnyl for pain.        ? CIWA protocol, discontinue propofol, neurology consult, seizure precautions    Pulmonary care:         ? intubated    Cardiovascular:          ? Monitor hemodynamic status.       GI care:         ? NPO except meds, Ng tube to LIS      Fluids/ Electrolytes/ Nutrition:         ? LR for IV fluid hydration        ? No indication for parenteral nutrition.   Renal/ Fluid Balance:          ? Will continue to monitor intake and output.     Endocrine:          ? Sliding scale for diabetes management.      ID/ Antibiotics:        ? Zosyn for UTI, blood and urine cultures pending    Heme:           ? Hemoglobin stable   Prophylaxis:          ? Mechanical prophylaxis for DVT.         Miscellaneous:          ? pharmacy consult for meds review. PTOT, social work for dispo      Lines/ tubes/ drains:        ? ETT, ng tube, PIV x2, urinary catheter   Disposition:        ? Surgical ICU.    Patient seen, findings and plan discussed with surgical ICU staff Dr. Dodson.    Briseida Petersen MD  [facility]

## 2020-06-13 NOTE — PLAN OF CARE
Neuro: Sedated. When awake agitated and tremoring. Following CIWA protocol. Scores 10-20. Tremors seem to increase this afternoon. Precedex infusing at 0.7. Following some simple commands. Inconsistent. Moves all extremities. Pain managed with IV dilaudid and scheduled meds. T max 101.1  Cardiac: SR. Hr 70s-90s. -140. Will continue to monitor.   Respiratory:  CMV . . Peep 5. Fio2 35%. Resp 23. PS 7/5 Failed this morning. This afternoon lasted 30 minutes d/t agitation.   GI: Active bowel sounds. NPO. NGT to LIS with brown output.   : Adequate UOP via ga cath.   IV:  R PIV x 1. L PIV x 1.   Skin: Ecchymosis noted R hip, L Hip incision with dressing.     PLAN: Left to OR for PNT placement at 1730. Will continue with current plan of care. Notify MD with any acute changes.

## 2020-06-13 NOTE — PLAN OF CARE
4AB PT: PT orders acknowledged and appreciated. Pt not medically appropriate for PT evaluation this date, will reschedule PT evaluation.

## 2020-06-13 NOTE — ED PROVIDER NOTES
ED Provider Note  [facility]      History     Chief Complaint   Patient presents with     Trauma     HPI  Abhijeet Mccauley is a 59 year old male with past medical history significant for alcohol abuse with withdrawal seizures,  esophageal varices, acute inflammatory demyelinating polyneuropathy, COPD, ulcerative colitis and renal insufficiency who presents to the ED via EMS from [facility] after a fall. Patient had left hip replacement 2 weeks ago for avascular necrosis and has been home with health care. Per EMS his home health RN found him on the floor. A&O x 3 upon arrival to ED at [facility], pt spiked a fever of 100.8 F and became increasingly confused, A&O x 1-2, had multiple seizures, Ativan given, became increasingly less responsive, on propofol drip, CIWA was 26, multiple rib fractures.    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)     s/p chemo, no bone marrow transplant     Chronic obstructive pulmonary disease 5/17/2018     COPD (chronic obstructive pulmonary disease) (H)      History of pulmonary embolus (PE)      Hypertension      PUD (peptic ulcer disease)      Tobacco dependence      Ulcerative colitis (H)      Past Surgical History:   Procedure Laterality Date     ARTHROPLASTY HIP Left 5/29/2020    Procedure: ARTHROPLASTY, HIP, TOTAL;  Surgeon: Carlton Jones MD;  Location: [facility] OR     AS TOTAL KNEE ARTHROPLASTY Left      BONE MARROW BIOPSY, BONE SPECIMEN, NEEDLE/TROCAR N/A 6/12/2018    Procedure: BIOPSY BONE MARROW;  Bone Marrow Biopsy;  Surgeon: Demar Spap MD;  Location: [facility] GI     ESOPHAGOSCOPY, GASTROSCOPY, DUODENOSCOPY (EGD), COMBINED N/A 2/8/2018    Procedure: COMBINED ESOPHAGOSCOPY, GASTROSCOPY, DUODENOSCOPY (EGD), BIOPSY SINGLE OR MULTIPLE;  gastroscopy with biopsies;   Surgeon: Raz Cobb MD;  Location: [facility] GI     ESOPHAGOSCOPY, GASTROSCOPY, DUODENOSCOPY (EGD), COMBINED N/A 3/18/2018    Procedure: COMBINED ESOPHAGOSCOPY, GASTROSCOPY, DUODENOSCOPY (EGD);;  Surgeon: Raz Cobb MD;  Location: [facility]     ESOPHAGOSCOPY, GASTROSCOPY, DUODENOSCOPY (EGD), COMBINED N/A 2/28/2020    Procedure: ESOPHAGOGASTRODUODENOSCOPY, WITH BIOPSY;  Surgeon: Chente Mclaughlin DO;  Location: [facility] GI     LACERATION REPAIR Right     Right leg     PHACOEMULSIFICATION WITH STANDARD INTRAOCULAR LENS IMPLANT Left 7/1/2019    Procedure: cataract removal with implant.;  Surgeon: Adrian Oliveira MD;  Location: [facility] OR     PHACOEMULSIFICATION WITH STANDARD INTRAOCULAR LENS IMPLANT Right 7/29/2019    Procedure: Cataract removal with implant.;  Surgeon: Adrian Oliveira MD;  Location: [facility] OR     acetaminophen 500 MG PO tablet  albuterol (VENTOLIN HFA) 108 (90 Base) MCG/ACT inhaler  aspirin (ASA) 81 MG EC tablet  atorvastatin 20 MG PO tablet  baclofen (LIORESAL) 10 MG tablet  buPROPion (WELLBUTRIN SR) 150 MG 12 hr tablet  desonide (DESOWEN) 0.05 % external cream  diclofenac (VOLTAREN) 1 % topical gel  docusate sodium (COLACE) 100 MG tablet  DULERA 200-5 MCG/ACT inhaler  folic acid (FOLVITE) 1 MG tablet  furosemide (LASIX) 20 MG tablet  gabapentin (NEURONTIN) 300 MG capsule  hydrOXYzine (ATARAX) 25 MG tablet  loratadine (CLARITIN) 10 MG tablet  montelukast (SINGULAIR) 10 MG tablet  nicotine 2 MG MT lozenge  nystatin (MYCOSTATIN) 195214 UNIT/ML suspension  order for DME  order for DME  order for DME  order for DME  order for DME  oxyCODONE (ROXICODONE) 5 MG tablet  pantoprazole (PROTONIX) 40 MG EC tablet  polyethylene glycol (MIRALAX) 17 g packet  potassium chloride ER (KLOR-CON M) 10 MEQ CR tablet  propranolol (INDERAL) 20 MG tablet  rifaximin (XIFAXAN) 550 MG TABS tablet  senna-docusate (SENOKOT-S/PERICOLACE) 8.6-50 MG tablet  spironolactone (ALDACTONE) 50 MG tablet  tiotropium (SPIRIVA) 18 MCG  "capsule  traMADol (ULTRAM) 50 MG tablet  traZODone (DESYREL) 50 MG tablet  trolamine salicylate (ASPERCREME) 10 % external cream      Allergies   Allergen Reactions     Blood Transfusion Related (Informational Only)      Patient has a history of a clinically significant antibody against RBC antigens.  A delay in compatible RBCs may occur.     Famotidine Unknown and Other (See Comments)     Severe abdominal cramps     Cyclobenzaprine Hives     Methocarbamol Other (See Comments)     Made pt's \"face break out\"     Pepcid Cramps     Severe abdominal cramps     Vancomycin Other (See Comments)     hallucinations     Vfend Other (See Comments)     IV - cold sweats, Iron tablet makes him nauseated and stomach ached     Hydrocodone-Acetaminophen Rash     Past medical history, past surgical history, medications, and allergies were reviewed with the patient. Additional pertinent items: None    Family History  Family History   Problem Relation Age of Onset     Hypertension Mother      Coronary Artery Disease Father         MI     Family history was reviewed with the patient. Additional pertinent items: None    Social History  Social History     Tobacco Use     Smoking status: Current Every Day Smoker     Packs/day: 0.50     Years: 30.00     Pack years: 15.00     Types: Cigarettes     Smokeless tobacco: Never Used     Tobacco comment: 5 cigarettes a day    Substance Use Topics     Alcohol use: Yes     Comment: Down to 3 beers per day.  Sometimes a cocktail also.     Drug use: Yes     Types: Marijuana      Social history was reviewed with the patient. Additional pertinent items: None    Review of Systems   Unable to perform ROS: Mental status change     A complete review of systems was performed with pertinent positives and negatives noted in the HPI, and all other systems negative.    Physical Exam   BP: 137/67  Pulse: 79  Heart Rate: 80  Temp: 98.2  F (36.8  C)  Resp: 24  SpO2: 100 %  Physical Exam  Vitals signs and nursing " note reviewed.   Constitutional:       Appearance: He is not diaphoretic.      Comments: Patient is obtunded to RASS -4, intubated and mechanically ventilated, without increased work of breathing.   HENT:      Mouth/Throat:      Mouth: Mucous membranes are moist.      Comments: Dried blood at the teeth without obvious trauma  Eyes:      General: No scleral icterus.     Pupils: Pupils are equal, round, and reactive to light.   Cardiovascular:      Rate and Rhythm: Normal rate and regular rhythm.   Pulmonary:      Effort: Pulmonary effort is normal. No respiratory distress.      Breath sounds: Normal breath sounds. No stridor. No wheezing or rales.   Abdominal:      Palpations: Abdomen is soft.      Tenderness: There is no guarding or rebound.   Musculoskeletal:      Comments: Well-healing surgical incision site left hip.   Skin:     General: Skin is warm and dry.      Capillary Refill: Capillary refill takes less than 2 seconds.   Neurological:      Comments: Patient is sedated         ED Course      Procedures       Results for orders placed or performed during the hospital encounter of 06/12/20   CT Head w/o Contrast     Status: None    Narrative    CT SCAN OF THE HEAD WITHOUT CONTRAST   6/12/2020 10:53 AM     HISTORY:  Fall.    TECHNIQUE:  Axial images of the head and coronal reformations without  IV contrast material.  Radiation dose for this scan was reduced using  automated exposure control, adjustment of the mA and/or kV according  to patient size, or iterative reconstruction technique.    COMPARISON: 2/25/2019    FINDINGS: There is some cerebral atrophy. Minimal nonspecific white  matter changes without mass effect are present. There is no evidence  for intracranial hemorrhage, mass effect, acute infarct, or skull  fracture. Visualized paranasal sinuses and mastoid air cells are  clear. Vascular calcifications noted.      Impression    IMPRESSION: Chronic changes. No evidence for intracranial hemorrhage  or  new acute process.    ABY WADDELL MD   CT Cervical Spine w/o Contrast     Status: None    Narrative    CT CERVICAL SPINE WITHOUT CONTRAST   6/12/2020 10:54 AM     HISTORY: Fall.     TECHNIQUE: Axial images of the cervical spine were obtained without  intravenous contrast. Multiplanar reformations were performed.  Radiation dose for this scan was reduced using automated exposure  control, adjustment of the mA and/or kV according to patient size, or  iterative reconstruction technique.     COMPARISON: 2/25/2019.    FINDINGS: Sagittal reconstructions demonstrate minimal chronic  degenerative spondylolisthesis of C2 on C3, otherwise normal posterior  alignment. There is no evidence for fracture. Multilevel degenerative  disc and facet disease is present with moderate to severe central  canal stenoses at C5-C6 and C6-C7. There is also moderate to severe  bilateral neural foraminal stenoses at several levels. Extensive  vascular calcifications noted.      Impression    IMPRESSION:  1. No evidence for fracture.  2. Multilevel degenerative disc and facet disease again noted.    ABY WADDELL MD   Shoulder XR, 2 view, right     Status: None    Narrative    SHOULDER TWO VIEWS RIGHT, PELVIS ONE TO TWO VIEWS, RIBS AND CHEST  RIGHT THREE VIEWS June 12, 2020 11:26 AM     HISTORY: Right rib pain, right shoulder pain and hip pain after a  fall.    COMPARISON: AP pelvis 12/5/2019. Two view chest 8/28/2019.      Impression    IMPRESSION:     Right shoulder: Advanced osteoarthritis at the glenohumeral joint.  Vascular calcifications. Multiple rib fractures described below. No  other acute bony abnormality.    Chest/right Ribs: No active cardiopulmonary disease. There are acute  displaced fractures of the posterior right third through seventh ribs  as well as the lateral right seventh rib and anterior lateral right  eighth and ninth ribs. The chest x-ray also shows slightly displaced  fractures of the left posterolateral fourth and  fifth ribs. These may  be old and healed but were not definitely present on prior chest  x-rays.    AP pelvis: Interval placement of a left hip arthroplasty with  components in the expected location on the AP view. No acute appearing  bony abnormality. Extensive vascular calcifications.    AKIN DORANTES MD   Ribs XR, unilat 3 views + PA chest, right     Status: None    Narrative    SHOULDER TWO VIEWS RIGHT, PELVIS ONE TO TWO VIEWS, RIBS AND CHEST  RIGHT THREE VIEWS June 12, 2020 11:26 AM     HISTORY: Right rib pain, right shoulder pain and hip pain after a  fall.    COMPARISON: AP pelvis 12/5/2019. Two view chest 8/28/2019.      Impression    IMPRESSION:     Right shoulder: Advanced osteoarthritis at the glenohumeral joint.  Vascular calcifications. Multiple rib fractures described below. No  other acute bony abnormality.    Chest/right Ribs: No active cardiopulmonary disease. There are acute  displaced fractures of the posterior right third through seventh ribs  as well as the lateral right seventh rib and anterior lateral right  eighth and ninth ribs. The chest x-ray also shows slightly displaced  fractures of the left posterolateral fourth and fifth ribs. These may  be old and healed but were not definitely present on prior chest  x-rays.    AP pelvis: Interval placement of a left hip arthroplasty with  components in the expected location on the AP view. No acute appearing  bony abnormality. Extensive vascular calcifications.    AKIN DORANTES MD   XR Pelvis 1/2 Views     Status: None    Narrative    SHOULDER TWO VIEWS RIGHT, PELVIS ONE TO TWO VIEWS, RIBS AND CHEST  RIGHT THREE VIEWS June 12, 2020 11:26 AM     HISTORY: Right rib pain, right shoulder pain and hip pain after a  fall.    COMPARISON: AP pelvis 12/5/2019. Two view chest 8/28/2019.      Impression    IMPRESSION:     Right shoulder: Advanced osteoarthritis at the glenohumeral joint.  Vascular calcifications. Multiple rib fractures described below.  No  other acute bony abnormality.    Chest/right Ribs: No active cardiopulmonary disease. There are acute  displaced fractures of the posterior right third through seventh ribs  as well as the lateral right seventh rib and anterior lateral right  eighth and ninth ribs. The chest x-ray also shows slightly displaced  fractures of the left posterolateral fourth and fifth ribs. These may  be old and healed but were not definitely present on prior chest  x-rays.    AP pelvis: Interval placement of a left hip arthroplasty with  components in the expected location on the AP view. No acute appearing  bony abnormality. Extensive vascular calcifications.    AKIN DORANTES MD   CT Chest/Abdomen/Pelvis w Contrast     Status: None    Narrative    CT CHEST, ABDOMEN, AND PELVIS WITH CONTRAST 6/12/2020 1:33 PM     HISTORY: Fall; multiple rib fractures.    COMPARISON: October 3, 2019    TECHNIQUE: Volumetric helical acquisition of CT images from the lung  apices through the symphysis pubis after the administration of 90 mL  Isovue 370 intravenous contrast. Radiation dose for this scan was  reduced using automated exposure control, adjustment of the mA and/or  kV according to patient size, or iterative reconstruction technique.    FINDINGS:   Chest: Fractures of the right third, fourth, fifth, sixth, seventh,  and eighth ribs on the right noted. Multiple rib fractures have  posterior and anterolateral components. No definite pneumothorax.  Trace peripheral atelectasis and/or fibrosis. Trace atelectasis and/or  fibrosis on the right. Left lung clear. No definite left-sided  fractures. Extensive gastroesophageal varices.    Abdomen and pelvis: Cirrhotic liver morphology. Multiple portal  systemic collaterals. No definite splenomegaly. Cholelithiasis without  cholecystitis. Adrenal glands, pancreas, and spleen are unremarkable.  Some atrophy and a large calcification seen in the left kidney  measuring up to 1.5 cm. Right kidney  unremarkable. No hydronephrosis.  There are moderate atherosclerotic changes of the visualized aorta and  its branches. There is no evidence of aortic dissection or aneurysm.  There are no dilated loops of small intestine or large bowel to  suggest ileus or obstruction.    No frankly destructive bony lesions.      Impression    IMPRESSION:  1. Right-sided third-eighth rib fractures, multiple ribs have anterior  and posterior components.  2. Otherwise no acute process demonstrated in the abdomen and pelvis.    MAUREEN BERNABE MD   XR Chest Port 1 View     Status: None    Narrative    CHEST ONE VIEW PORTABLE   6/12/2020 4:49 PM     HISTORY: Post intubation.    COMPARISON: None.      Impression    IMPRESSION: Tip of the endotracheal tube is 5.5 cm above the masoud.  Nasogastric tube extends below the left hemidiaphragm. No pneumothorax  or pleural effusion. Mild interstitial abnormalities without gross  airspace consolidation.    MAGGY KAMINSKI MD   CBC with platelets differential     Status: Abnormal   Result Value Ref Range    WBC 15.2 (H) 4.0 - 11.0 10e9/L    RBC Count 3.12 (L) 4.4 - 5.9 10e12/L    Hemoglobin 10.2 (L) 13.3 - 17.7 g/dL    Hematocrit 32.4 (L) 40.0 - 53.0 %     (H) 78 - 100 fl    MCH 32.7 26.5 - 33.0 pg    MCHC 31.5 31.5 - 36.5 g/dL    RDW Dimorphic population - unable to calculate 10.0 - 15.0 %    Platelet Count 201 150 - 450 10e9/L    Diff Method Automated Method     % Neutrophils 78.7 %    % Lymphocytes 5.0 %    % Monocytes 13.9 %    % Eosinophils 0.2 %    % Basophils 0.4 %    % Immature Granulocytes 1.8 %    Nucleated RBCs 0 0 /100    Absolute Neutrophil 11.9 (H) 1.6 - 8.3 10e9/L    Absolute Lymphocytes 0.8 0.8 - 5.3 10e9/L    Absolute Monocytes 2.1 (H) 0.0 - 1.3 10e9/L    Absolute Eosinophils 0.0 0.0 - 0.7 10e9/L    Absolute Basophils 0.1 0.0 - 0.2 10e9/L    Abs Immature Granulocytes 0.3 0 - 0.4 10e9/L    Absolute Nucleated RBC 0.0    Basic metabolic panel     Status: Abnormal   Result  Value Ref Range    Sodium 138 133 - 144 mmol/L    Potassium 3.8 3.4 - 5.3 mmol/L    Chloride 106 94 - 109 mmol/L    Carbon Dioxide 25 20 - 32 mmol/L    Anion Gap 7 3 - 14 mmol/L    Glucose 121 (H) 70 - 99 mg/dL    Urea Nitrogen 17 7 - 30 mg/dL    Creatinine 1.39 (H) 0.66 - 1.25 mg/dL    GFR Estimate 54 (L) >60 mL/min/[1.73_m2]    GFR Estimate If Black 63 >60 mL/min/[1.73_m2]    Calcium 9.1 8.5 - 10.1 mg/dL   Hepatic panel     Status: Abnormal   Result Value Ref Range    Bilirubin Direct 1.2 (H) 0.0 - 0.2 mg/dL    Bilirubin Total 1.9 (H) 0.2 - 1.3 mg/dL    Albumin 2.9 (L) 3.4 - 5.0 g/dL    Protein Total 6.9 6.8 - 8.8 g/dL    Alkaline Phosphatase 464 (H) 40 - 150 U/L    ALT 19 0 - 70 U/L    AST 38 0 - 45 U/L   Alcohol ethyl     Status: None   Result Value Ref Range    Ethanol g/dL <0.01 <0.01 g/dL   INR     Status: Abnormal   Result Value Ref Range    INR 1.47 (H) 0.86 - 1.14   Lipase     Status: None   Result Value Ref Range    Lipase 192 73 - 393 U/L   Partial thromboplastin time     Status: None   Result Value Ref Range    PTT 37 22 - 37 sec   Troponin I     Status: None   Result Value Ref Range    Troponin I ES <0.015 0.000 - 0.045 ug/L   Lactic acid whole blood     Status: None   Result Value Ref Range    Lactic Acid 1.8 0.7 - 2.0 mmol/L   Symptomatic COVID-19 Virus (Coronavirus) by PCR     Status: None    Specimen: Nasopharyngeal   Result Value Ref Range    COVID-19 Virus PCR to [facility] - Source Nasopharyngeal     COVID-19 Virus PCR to [facility] - Result       Test received-See reflex to IDDL test SARS CoV2 (COVID-19) Virus RT-PCR   Ammonia (on ice)     Status: Abnormal   Result Value Ref Range    Ammonia 71 (H) 10 - 50 umol/L   Blood gas venous     Status: Abnormal   Result Value Ref Range    Ph Venous 7.36 7.32 - 7.43 pH    PCO2 Venous 35 (L) 40 - 50 mm Hg    PO2 Venous 56 (H) 25 - 47 mm Hg    Bicarbonate Venous 20 (L) 21 - 28 mmol/L    Base Deficit Venous 5.3 mmol/L    FIO2 30 % fio2 on vent    UA with  Microscopic reflex to Culture     Status: Abnormal    Specimen: Catheterized Urine   Result Value Ref Range    Color Urine Elyssa     Appearance Urine Cloudy     Glucose Urine Negative NEG^Negative mg/dL    Bilirubin Urine Negative NEG^Negative    Ketones Urine 5 (A) NEG^Negative mg/dL    Specific Gravity Urine 1.010 1.003 - 1.035    Blood Urine Moderate (A) NEG^Negative    pH Urine 5.0 5.0 - 7.0 pH    Protein Albumin Urine 30 (A) NEG^Negative mg/dL    Urobilinogen mg/dL 0.0 0.0 - 2.0 mg/dL    Nitrite Urine Negative NEG^Negative    Leukocyte Esterase Urine Large (A) NEG^Negative    Source Catheterized Urine     WBC Urine >182 (H) 0 - 5 /HPF    RBC Urine 36 (H) 0 - 2 /HPF    WBC Clumps Present (A) NEG^Negative /HPF    Bacteria Urine Few (A) NEG^Negative /HPF    Squamous Epithelial /HPF Urine 1 0 - 1 /HPF   SARS-CoV-2 COVID-19 Virus (Coronavirus) RT-PCR Nasopharyngeal     Status: None    Specimen: Nasopharyngeal   Result Value Ref Range    SARS-CoV-2 Virus Specimen Source Nasopharyngeal     SARS-CoV-2 PCR Result NEGATIVE     SARS-CoV-2 PCR Comment       This automated, real-time RT-PCR assay by [employer] on the [employer] has   been given Emergency Use Authorization (EUA) for the in vitro qualitative detection of RNA   from the SARS-CoV2 virus in nasopharyngeal swabs in viral transport medium from patients   with signs and symptoms of infection who are suspected of COVID-19. The performance is   unknown in asymptomatic patients.     Drug abuse screen 77 urine ([place], [place], [place])     Status: Abnormal   Result Value Ref Range    Amphetamine Qual Urine Negative NEG^Negative    Barbiturates Qual Urine Negative NEG^Negative    Benzodiazepine Qual Urine Negative NEG^Negative    Cannabinoids Qual Urine Positive (A) NEG^Negative    Cocaine Qual Urine Negative NEG^Negative    Opiates Qualitative Urine Negative NEG^Negative    PCP Qual Urine Negative NEG^Negative   Occult blood stool POCT     Status: Normal   Result  Value Ref Range    Occult Blood negative neg    Internal QC OK No     Test Card Lot Number [id number]    Blood culture     Status: None (Preliminary result)    Specimen: Blood    Left Arm   Result Value Ref Range    Specimen Description Blood Left Arm     Culture Micro PENDING    Blood culture     Status: None (Preliminary result)    Specimen: Blood    Left Arm   Result Value Ref Range    Specimen Description Blood Left Arm     Culture Micro PENDING      Medications   propofol (DIPRIVAN) infusion (5 mcg/kg/min × 83.9 kg Intravenous New Bag 6/12/20 2048)   fentaNYL (PF) (SUBLIMAZE) injection 25 mcg (has no administration in time range)        Assessments & Plan (with Medical Decision Making)   Alcohol withdrawal seizures witnessed by outside ER.  Patient was found down on the floor by his home health nurse.  Multiple rib fractures on the right side.  No pulmonary contusion or pneumo or hemothorax.  No signs of trauma to abdomen or pelvis on CT.  CT head and CT C-spine without signs of acute trauma.  Patient arrived to ER intubated and sedated on very low-dose propofol 5 mcg/kg/min. RASS -4.  Pupils equal and reactive.  Patient was admitted to trauma SICU and will likely need continuous EEG/neuro crit consult.    I have reviewed the nursing notes. I have reviewed the findings, diagnosis, plan and need for follow up with the patient.    New Prescriptions    No medications on file       Final diagnoses:   Alcohol withdrawal syndrome with complication (H)   I, Madison Bains, am serving as a trained medical scribe to document services personally performed by Leonor Nash MD, based on the provider's statements to me.     ILeonor MD, was physically present and have reviewed and verified the accuracy of this note documented by Madison Bains.      --  Leonor Nash MD  Emergency Medicine   [facility], EMERGENCY DEPARTMENT  6/12/2020     Leonor Nash MD  06/12/20 [id number]

## 2020-06-14 ENCOUNTER — APPOINTMENT (OUTPATIENT)
Dept: CT IMAGING | Facility: CLINIC | Age: 60
End: 2020-06-14
Payer: COMMERCIAL

## 2020-06-14 ENCOUNTER — APPOINTMENT (OUTPATIENT)
Dept: NEUROLOGY | Facility: CLINIC | Age: 60
End: 2020-06-14
Attending: STUDENT IN AN ORGANIZED HEALTH CARE EDUCATION/TRAINING PROGRAM
Payer: COMMERCIAL

## 2020-06-14 PROBLEM — R65.21 SEPSIS WITH ACUTE RENAL FAILURE AND SEPTIC SHOCK, DUE TO UNSPECIFIED ORGANISM, UNSPECIFIED ACUTE RENAL FAILURE TYPE (H): Status: ACTIVE | Noted: 2020-06-12

## 2020-06-14 PROBLEM — A41.9 SEPSIS WITH ACUTE RENAL FAILURE AND SEPTIC SHOCK, DUE TO UNSPECIFIED ORGANISM, UNSPECIFIED ACUTE RENAL FAILURE TYPE (H): Status: ACTIVE | Noted: 2020-06-12

## 2020-06-14 PROBLEM — N17.9 SEPSIS WITH ACUTE RENAL FAILURE AND SEPTIC SHOCK, DUE TO UNSPECIFIED ORGANISM, UNSPECIFIED ACUTE RENAL FAILURE TYPE (H): Status: ACTIVE | Noted: 2020-06-12

## 2020-06-14 LAB
ANION GAP SERPL CALCULATED.3IONS-SCNC: 11 MMOL/L (ref 3–14)
ANION GAP SERPL CALCULATED.3IONS-SCNC: 8 MMOL/L (ref 3–14)
BASE DEFICIT BLDA-SCNC: 11.1 MMOL/L
BASE DEFICIT BLDA-SCNC: 11.4 MMOL/L
BASE DEFICIT BLDA-SCNC: 13.2 MMOL/L
BUN SERPL-MCNC: 20 MG/DL (ref 7–30)
BUN SERPL-MCNC: 23 MG/DL (ref 7–30)
CALCIUM SERPL-MCNC: 6.9 MG/DL (ref 8.5–10.1)
CALCIUM SERPL-MCNC: 7.6 MG/DL (ref 8.5–10.1)
CHLORIDE SERPL-SCNC: 114 MMOL/L (ref 94–109)
CHLORIDE SERPL-SCNC: 117 MMOL/L (ref 94–109)
CO2 SERPL-SCNC: 15 MMOL/L (ref 20–32)
CO2 SERPL-SCNC: 16 MMOL/L (ref 20–32)
CREAT SERPL-MCNC: 1.65 MG/DL (ref 0.66–1.25)
CREAT SERPL-MCNC: 1.88 MG/DL (ref 0.66–1.25)
ERYTHROCYTE [DISTWIDTH] IN BLOOD BY AUTOMATED COUNT: ABNORMAL % (ref 10–15)
ERYTHROCYTE [DISTWIDTH] IN BLOOD BY AUTOMATED COUNT: ABNORMAL % (ref 10–15)
GFR SERPL CREATININE-BSD FRML MDRD: 38 ML/MIN/{1.73_M2}
GFR SERPL CREATININE-BSD FRML MDRD: 44 ML/MIN/{1.73_M2}
GLUCOSE BLDC GLUCOMTR-MCNC: 108 MG/DL (ref 70–99)
GLUCOSE BLDC GLUCOMTR-MCNC: 119 MG/DL (ref 70–99)
GLUCOSE BLDC GLUCOMTR-MCNC: 121 MG/DL (ref 70–99)
GLUCOSE BLDC GLUCOMTR-MCNC: 168 MG/DL (ref 70–99)
GLUCOSE BLDC GLUCOMTR-MCNC: 74 MG/DL (ref 70–99)
GLUCOSE SERPL-MCNC: 120 MG/DL (ref 70–99)
GLUCOSE SERPL-MCNC: 146 MG/DL (ref 70–99)
HCO3 BLD-SCNC: 11 MMOL/L (ref 21–28)
HCO3 BLD-SCNC: 13 MMOL/L (ref 21–28)
HCO3 BLD-SCNC: 14 MMOL/L (ref 21–28)
HCT VFR BLD AUTO: 28.9 % (ref 40–53)
HCT VFR BLD AUTO: 31.8 % (ref 40–53)
HGB BLD-MCNC: 8.6 G/DL (ref 13.3–17.7)
HGB BLD-MCNC: 9.6 G/DL (ref 13.3–17.7)
LACTATE BLD-SCNC: 2 MMOL/L (ref 0.7–2)
LACTATE BLD-SCNC: 2.9 MMOL/L (ref 0.7–2)
MAGNESIUM SERPL-MCNC: 1.9 MG/DL (ref 1.6–2.3)
MCH RBC QN AUTO: 31.6 PG (ref 26.5–33)
MCH RBC QN AUTO: 32.2 PG (ref 26.5–33)
MCHC RBC AUTO-ENTMCNC: 29.8 G/DL (ref 31.5–36.5)
MCHC RBC AUTO-ENTMCNC: 30.2 G/DL (ref 31.5–36.5)
MCV RBC AUTO: 106 FL (ref 78–100)
MCV RBC AUTO: 107 FL (ref 78–100)
O2/TOTAL GAS SETTING VFR VENT: 40 %
O2/TOTAL GAS SETTING VFR VENT: 40 %
O2/TOTAL GAS SETTING VFR VENT: 60 %
OXYHGB MFR BLD: 95 % (ref 92–100)
PCO2 BLD: 22 MM HG (ref 35–45)
PCO2 BLD: 25 MM HG (ref 35–45)
PCO2 BLD: 27 MM HG (ref 35–45)
PH BLD: 7.31 PH (ref 7.35–7.45)
PH BLD: 7.33 PH (ref 7.35–7.45)
PH BLD: 7.33 PH (ref 7.35–7.45)
PHOSPHATE SERPL-MCNC: 3.1 MG/DL (ref 2.5–4.5)
PLATELET # BLD AUTO: 114 10E9/L (ref 150–450)
PLATELET # BLD AUTO: 155 10E9/L (ref 150–450)
PO2 BLD: 85 MM HG (ref 80–105)
PO2 BLD: 92 MM HG (ref 80–105)
PO2 BLD: 96 MM HG (ref 80–105)
POTASSIUM SERPL-SCNC: 4 MMOL/L (ref 3.4–5.3)
POTASSIUM SERPL-SCNC: 4.3 MMOL/L (ref 3.4–5.3)
RBC # BLD AUTO: 2.72 10E12/L (ref 4.4–5.9)
RBC # BLD AUTO: 2.98 10E12/L (ref 4.4–5.9)
SODIUM SERPL-SCNC: 140 MMOL/L (ref 133–144)
SODIUM SERPL-SCNC: 141 MMOL/L (ref 133–144)
WBC # BLD AUTO: 37.4 10E9/L (ref 4–11)
WBC # BLD AUTO: 49.1 10E9/L (ref 4–11)

## 2020-06-14 PROCEDURE — 82803 BLOOD GASES ANY COMBINATION: CPT | Performed by: STUDENT IN AN ORGANIZED HEALTH CARE EDUCATION/TRAINING PROGRAM

## 2020-06-14 PROCEDURE — 25000131 ZZH RX MED GY IP 250 OP 636 PS 637: Performed by: STUDENT IN AN ORGANIZED HEALTH CARE EDUCATION/TRAINING PROGRAM

## 2020-06-14 PROCEDURE — 83605 ASSAY OF LACTIC ACID: CPT | Performed by: ANESTHESIOLOGY

## 2020-06-14 PROCEDURE — 40000275 ZZH STATISTIC RCP TIME EA 10 MIN

## 2020-06-14 PROCEDURE — 25000125 ZZHC RX 250: Performed by: STUDENT IN AN ORGANIZED HEALTH CARE EDUCATION/TRAINING PROGRAM

## 2020-06-14 PROCEDURE — 82805 BLOOD GASES W/O2 SATURATION: CPT | Performed by: STUDENT IN AN ORGANIZED HEALTH CARE EDUCATION/TRAINING PROGRAM

## 2020-06-14 PROCEDURE — 40000014 ZZH STATISTIC ARTERIAL MONITORING DAILY

## 2020-06-14 PROCEDURE — 25000132 ZZH RX MED GY IP 250 OP 250 PS 637: Performed by: STUDENT IN AN ORGANIZED HEALTH CARE EDUCATION/TRAINING PROGRAM

## 2020-06-14 PROCEDURE — 80048 BASIC METABOLIC PNL TOTAL CA: CPT | Performed by: STUDENT IN AN ORGANIZED HEALTH CARE EDUCATION/TRAINING PROGRAM

## 2020-06-14 PROCEDURE — 82803 BLOOD GASES ANY COMBINATION: CPT | Performed by: ANESTHESIOLOGY

## 2020-06-14 PROCEDURE — 27210437 ZZH NUTRITION PRODUCT SEMIELEM INTERMED LITER

## 2020-06-14 PROCEDURE — 20000004 ZZH R&B ICU UMMC

## 2020-06-14 PROCEDURE — 95711 VEEG 2-12 HR UNMONITORED: CPT

## 2020-06-14 PROCEDURE — 87040 BLOOD CULTURE FOR BACTERIA: CPT | Performed by: ANESTHESIOLOGY

## 2020-06-14 PROCEDURE — 85027 COMPLETE CBC AUTOMATED: CPT | Performed by: ANESTHESIOLOGY

## 2020-06-14 PROCEDURE — 25000132 ZZH RX MED GY IP 250 OP 250 PS 637: Performed by: SURGERY

## 2020-06-14 PROCEDURE — C9113 INJ PANTOPRAZOLE SODIUM, VIA: HCPCS | Performed by: STUDENT IN AN ORGANIZED HEALTH CARE EDUCATION/TRAINING PROGRAM

## 2020-06-14 PROCEDURE — 83735 ASSAY OF MAGNESIUM: CPT | Performed by: ANESTHESIOLOGY

## 2020-06-14 PROCEDURE — 05HM33Z INSERTION OF INFUSION DEVICE INTO RIGHT INTERNAL JUGULAR VEIN, PERCUTANEOUS APPROACH: ICD-10-PCS | Performed by: ANESTHESIOLOGY

## 2020-06-14 PROCEDURE — 84100 ASSAY OF PHOSPHORUS: CPT | Performed by: ANESTHESIOLOGY

## 2020-06-14 PROCEDURE — 74176 CT ABD & PELVIS W/O CONTRAST: CPT

## 2020-06-14 PROCEDURE — 83605 ASSAY OF LACTIC ACID: CPT | Performed by: STUDENT IN AN ORGANIZED HEALTH CARE EDUCATION/TRAINING PROGRAM

## 2020-06-14 PROCEDURE — 85027 COMPLETE CBC AUTOMATED: CPT | Performed by: STUDENT IN AN ORGANIZED HEALTH CARE EDUCATION/TRAINING PROGRAM

## 2020-06-14 PROCEDURE — 25000128 H RX IP 250 OP 636: Performed by: STUDENT IN AN ORGANIZED HEALTH CARE EDUCATION/TRAINING PROGRAM

## 2020-06-14 PROCEDURE — 00000146 ZZHCL STATISTIC GLUCOSE BY METER IP

## 2020-06-14 PROCEDURE — 25800030 ZZH RX IP 258 OP 636: Performed by: STUDENT IN AN ORGANIZED HEALTH CARE EDUCATION/TRAINING PROGRAM

## 2020-06-14 PROCEDURE — 25800030 ZZH RX IP 258 OP 636: Performed by: SURGERY

## 2020-06-14 PROCEDURE — 80048 BASIC METABOLIC PNL TOTAL CA: CPT | Performed by: ANESTHESIOLOGY

## 2020-06-14 PROCEDURE — 40000281 ZZH STATISTIC TRANSPORT TIME EA 15 MIN

## 2020-06-14 PROCEDURE — 94003 VENT MGMT INPAT SUBQ DAY: CPT

## 2020-06-14 PROCEDURE — 36415 COLL VENOUS BLD VENIPUNCTURE: CPT | Performed by: ANESTHESIOLOGY

## 2020-06-14 RX ORDER — LORAZEPAM 1 MG/1
1-2 TABLET ORAL EVERY 4 HOURS PRN
Status: DISCONTINUED | OUTPATIENT
Start: 2020-06-14 | End: 2020-06-14

## 2020-06-14 RX ORDER — LANOLIN ALCOHOL/MO/W.PET/CERES
100 CREAM (GRAM) TOPICAL DAILY
Status: DISCONTINUED | OUTPATIENT
Start: 2020-06-20 | End: 2020-06-25 | Stop reason: HOSPADM

## 2020-06-14 RX ORDER — LANOLIN ALCOHOL/MO/W.PET/CERES
100 CREAM (GRAM) TOPICAL 3 TIMES DAILY
Status: COMPLETED | OUTPATIENT
Start: 2020-06-15 | End: 2020-06-19

## 2020-06-14 RX ORDER — POLYETHYLENE GLYCOL 3350 17 G/17G
17 POWDER, FOR SOLUTION ORAL DAILY PRN
Status: DISCONTINUED | OUTPATIENT
Start: 2020-06-14 | End: 2020-06-25 | Stop reason: HOSPADM

## 2020-06-14 RX ORDER — AMOXICILLIN 250 MG
1 CAPSULE ORAL 2 TIMES DAILY PRN
Status: DISCONTINUED | OUTPATIENT
Start: 2020-06-14 | End: 2020-06-25 | Stop reason: HOSPADM

## 2020-06-14 RX ORDER — AMOXICILLIN 250 MG
2 CAPSULE ORAL 2 TIMES DAILY PRN
Status: DISCONTINUED | OUTPATIENT
Start: 2020-06-14 | End: 2020-06-25 | Stop reason: HOSPADM

## 2020-06-14 RX ORDER — SODIUM CHLORIDE, SODIUM LACTATE, POTASSIUM CHLORIDE, CALCIUM CHLORIDE 600; 310; 30; 20 MG/100ML; MG/100ML; MG/100ML; MG/100ML
INJECTION, SOLUTION INTRAVENOUS CONTINUOUS
Status: DISCONTINUED | OUTPATIENT
Start: 2020-06-14 | End: 2020-06-16

## 2020-06-14 RX ORDER — LORAZEPAM 2 MG/ML
1-2 INJECTION INTRAMUSCULAR EVERY 30 MIN PRN
Status: DISCONTINUED | OUTPATIENT
Start: 2020-06-14 | End: 2020-06-18

## 2020-06-14 RX ORDER — LORAZEPAM 1 MG/1
1-2 TABLET ORAL EVERY 4 HOURS PRN
Status: DISCONTINUED | OUTPATIENT
Start: 2020-06-14 | End: 2020-06-18

## 2020-06-14 RX ORDER — SODIUM BICARBONATE 650 MG/1
650 TABLET ORAL 4 TIMES DAILY
Status: DISCONTINUED | OUTPATIENT
Start: 2020-06-14 | End: 2020-06-16

## 2020-06-14 RX ORDER — GABAPENTIN 300 MG/1
600 CAPSULE ORAL 3 TIMES DAILY
Status: DISCONTINUED | OUTPATIENT
Start: 2020-06-14 | End: 2020-06-15

## 2020-06-14 RX ORDER — DEXTROSE MONOHYDRATE 100 MG/ML
INJECTION, SOLUTION INTRAVENOUS CONTINUOUS PRN
Status: DISCONTINUED | OUTPATIENT
Start: 2020-06-14 | End: 2020-06-25 | Stop reason: HOSPADM

## 2020-06-14 RX ORDER — ACETAMINOPHEN 325 MG/1
975 TABLET ORAL EVERY 8 HOURS
Status: DISCONTINUED | OUTPATIENT
Start: 2020-06-14 | End: 2020-06-19

## 2020-06-14 RX ORDER — LORAZEPAM 2 MG/ML
1-2 INJECTION INTRAMUSCULAR EVERY 30 MIN PRN
Status: DISCONTINUED | OUTPATIENT
Start: 2020-06-14 | End: 2020-06-14

## 2020-06-14 RX ADMIN — PANTOPRAZOLE SODIUM 40 MG: 40 INJECTION, POWDER, FOR SOLUTION INTRAVENOUS at 08:38

## 2020-06-14 RX ADMIN — RIFAXIMIN 550 MG: 550 TABLET ORAL at 08:38

## 2020-06-14 RX ADMIN — SODIUM CHLORIDE, POTASSIUM CHLORIDE, SODIUM LACTATE AND CALCIUM CHLORIDE: 600; 310; 30; 20 INJECTION, SOLUTION INTRAVENOUS at 12:09

## 2020-06-14 RX ADMIN — SODIUM BICARBONATE 650 MG TABLET 650 MG: at 16:53

## 2020-06-14 RX ADMIN — ACETAMINOPHEN 975 MG: 325 TABLET, FILM COATED ORAL at 08:38

## 2020-06-14 RX ADMIN — BACLOFEN 10 MG: 10 TABLET ORAL at 19:43

## 2020-06-14 RX ADMIN — MAGNESIUM SULFATE IN WATER 2 G: 40 INJECTION, SOLUTION INTRAVENOUS at 05:14

## 2020-06-14 RX ADMIN — GABAPENTIN 600 MG: 300 CAPSULE ORAL at 19:43

## 2020-06-14 RX ADMIN — SODIUM BICARBONATE 650 MG TABLET 650 MG: at 19:43

## 2020-06-14 RX ADMIN — THIAMINE HYDROCHLORIDE 200 MG: 100 INJECTION, SOLUTION INTRAMUSCULAR; INTRAVENOUS at 09:03

## 2020-06-14 RX ADMIN — CEFEPIME 2 G: 2 INJECTION, POWDER, FOR SOLUTION INTRAVENOUS at 15:45

## 2020-06-14 RX ADMIN — DEXMEDETOMIDINE 0.3 MCG/KG/HR: 100 INJECTION, SOLUTION, CONCENTRATE INTRAVENOUS at 03:14

## 2020-06-14 RX ADMIN — MULTIVITAMIN 15 ML: LIQUID ORAL at 13:04

## 2020-06-14 RX ADMIN — INSULIN ASPART 1 UNITS: 100 INJECTION, SOLUTION INTRAVENOUS; SUBCUTANEOUS at 19:43

## 2020-06-14 RX ADMIN — ACETAMINOPHEN 975 MG: 325 TABLET, FILM COATED ORAL at 16:52

## 2020-06-14 RX ADMIN — MEROPENEM 1 G: 1 INJECTION, POWDER, FOR SOLUTION INTRAVENOUS at 03:46

## 2020-06-14 RX ADMIN — SODIUM CHLORIDE, POTASSIUM CHLORIDE, SODIUM LACTATE AND CALCIUM CHLORIDE: 600; 310; 30; 20 INJECTION, SOLUTION INTRAVENOUS at 03:46

## 2020-06-14 RX ADMIN — GABAPENTIN 600 MG: 300 CAPSULE ORAL at 13:38

## 2020-06-14 RX ADMIN — FOLIC ACID 1 MG: 5 INJECTION, SOLUTION INTRAMUSCULAR; INTRAVENOUS; SUBCUTANEOUS at 09:04

## 2020-06-14 RX ADMIN — Medication 5000 UNITS: at 02:25

## 2020-06-14 RX ADMIN — THIAMINE HYDROCHLORIDE 200 MG: 100 INJECTION, SOLUTION INTRAMUSCULAR; INTRAVENOUS at 13:59

## 2020-06-14 RX ADMIN — BACLOFEN 10 MG: 10 TABLET ORAL at 08:39

## 2020-06-14 RX ADMIN — THIAMINE HYDROCHLORIDE 200 MG: 100 INJECTION, SOLUTION INTRAMUSCULAR; INTRAVENOUS at 03:16

## 2020-06-14 RX ADMIN — THIAMINE HYDROCHLORIDE 200 MG: 100 INJECTION, SOLUTION INTRAMUSCULAR; INTRAVENOUS at 19:43

## 2020-06-14 RX ADMIN — Medication 2.4 UNITS/HR: at 12:02

## 2020-06-14 RX ADMIN — Medication 5000 UNITS: at 16:53

## 2020-06-14 RX ADMIN — Medication 0.4 MG: at 13:53

## 2020-06-14 RX ADMIN — BACLOFEN 10 MG: 10 TABLET ORAL at 13:38

## 2020-06-14 RX ADMIN — MULTIPLE VITAMINS W/ MINERALS TAB 1 TABLET: TAB at 08:38

## 2020-06-14 RX ADMIN — GABAPENTIN 600 MG: 300 CAPSULE ORAL at 08:38

## 2020-06-14 RX ADMIN — Medication 5000 UNITS: at 08:38

## 2020-06-14 RX ADMIN — RIFAXIMIN 550 MG: 550 TABLET ORAL at 19:43

## 2020-06-14 RX ADMIN — ACETAMINOPHEN 975 MG: 325 TABLET, FILM COATED ORAL at 02:25

## 2020-06-14 RX ADMIN — SODIUM CHLORIDE, POTASSIUM CHLORIDE, SODIUM LACTATE AND CALCIUM CHLORIDE: 600; 310; 30; 20 INJECTION, SOLUTION INTRAVENOUS at 20:49

## 2020-06-14 ASSESSMENT — ACTIVITIES OF DAILY LIVING (ADL)
ADLS_ACUITY_SCORE: 22

## 2020-06-14 NOTE — PHARMACY
Pharmacy Tube Feeding Consult    Medication reviewed for administration by feeding tube and for potential food/drug interactions.    Recommendation: No changes are needed at this time.     Pharmacy will continue to follow as new medications are ordered.    Cally Payton, PharmD  PGY2 Infectious Disease Pharmacy Resident  Pager [phone]

## 2020-06-14 NOTE — PROGRESS NOTES
Trauma Surgery Progress Note  06/14/2020       Subjective:  Cystoscopy with Urology last night, 7 left ureteral stents, no PNT.      Objective:  Temp:  [97  F (36.1  C)-101.2  F (38.4  C)] 97.8  F (36.6  C)  Pulse:  [] 85  Heart Rate:  [] 75  Resp:  [23-30] 24  BP: ()/(26-91) 101/58  MAP:  [56 mmHg-82 mmHg] 74 mmHg  Arterial Line BP: ()/(22-58) 114/50  FiO2 (%):  [35 %-100 %] 50 %  SpO2:  [77 %-100 %] 98 %    I/O last 3 completed shifts:  In: 4557.96 [I.V.:2557.96; IV Piggyback:2000]  Out: 1702 [Urine:1392; Emesis/NG output:300; Blood:10]      Gen: Awake, alert, NAD  Resp: NLB on RA  Abd: soft, nondistended  Ext: WWP, no edema     Labs:  Recent Labs   Lab 06/14/20 0359 06/13/20 2244 06/13/20 2213 06/13/20  0443   WBC 49.1*  --  25.5* 19.1*   HGB 9.6* 9.8* 8.6* 8.7*     --  109* 129*       Recent Labs   Lab 06/14/20 0359 06/13/20 2244 06/13/20 2213 06/13/20  0829 06/13/20  0443    139 141  --  141   POTASSIUM 4.3 3.2* 3.3*  --  3.4   CHLORIDE 114*  --  115*  --  114*   CO2 15*  --  16*  --  18*   BUN 20  --  18  --  16   CR 1.88*  --  1.93*  --  1.44*   * 84 79  --  134*   BEE 7.6*  --  7.4*  --  7.7*   MAG 1.9  --   --  2.5* 1.6   PHOS 3.1  --   --  2.4* 1.7*      Assessment/Plan:   59 year old male who underwent left hip replacement 2 weeks ago, found down in garage by home nurse, transferred to [facility] from [facility] after he became febrile, had multiple seizures, became less response, CIWA 26, subsequently intubated. Found to have multiple posterior left rib fractures, concerns for urosepsis, concerns for possible EtOH withdrawal. Urology consulted for possible stone, able to place L ureteral stents, no PNT. Inicreasing WBC to 49.1 today  -- Continue IV antibiotics  -- Appreciate ongoing ICU, Urology and Neuro team cares  -- Tertiary exam once extubated and able to participate    Norm Khan MD  Surgery [phone]

## 2020-06-14 NOTE — PLAN OF CARE
4A CX pt not medically appropriate.  OT to hold and PT to follow, OT to initiate when appropriate.

## 2020-06-14 NOTE — PROGRESS NOTES
Neurocritical Care Note:  S: Intubated and sedated  O:  /58   Pulse 85   Temp 97.8  F (36.6  C) (Oral)   Resp 24   Wt 85.6 kg (188 lb 11.4 oz)   SpO2 98%   BMI 30.46 kg/m      Examined off sedation.  On exam, Mr Mccauley is lying with his eyes closed.  He does not open his eyes to verbal, tactile, or noxious stimuli.  Pupils are symmetric and react to light.  Intact cough and gag.  No facial asymmetry.  No spontaneous eye movements are purposeful gaze is observed.  No spontaneous limb movements or response to noxious stimuli is observed.    Imaging and labs personally reviewed in EMR.    A/P:   Mr. Mccauley is a 59-year-old gentleman currently admitted for management of urosepsis and alcohol withdrawal.  Overnight his neurologic condition worsened as he is now less responsive to stimuli and less arousable.  His change in mental status could be related to metabolic factors, such as worsening urosepsis or alcohol withdrawal. He had reported history of alcohol withdrawal seizures at the outside hospital before his transfer.  We will obtain video EEG to rule out subclinical seizures that may be contributing to his worsened neurologic condition.    This patient was seen and discussed with attending neurointensivist, Dr Rufino Zhu DO  Neurocritical Care Fellow  Team [facility] *[id number]  06/14/2020

## 2020-06-14 NOTE — PROGRESS NOTES
ICU attending note    59 year old male who underwent left hip replacement 2 weeks ago, found down in garage by home nurse, transferred to [facility] from [facility] after he became febrile, had multiple seizures, became less response, CIWA 26, subsequently intubated. Found to have multiple posterior left rib fractures, concerns for urosepsis, concerns for possible EtOH withdrawal and transferred from OSH to our facility for further care on 6/12.     The patient underwent an attampted IR placement of nephrostomy for urosepsis and kidney stones in OR that was unsuccesful on 6/13 and became hypotensive on arrival to ICU secondary to worsening septic shock, lactic acidosis and acute kidney injury.    We placed a central line for pressor administration, administered more fluid boluses. discussed with urology about the need to place ureteral stent to treat the infection source.We  continued Meropenem that was started earlier in the day for G negative Urosepsis.      I personally discussed with the Urology team and [facility] team in between OR cases and at the end of the second procedure.     Eva Olmos MD  Anesthesiology Critical Care Medicine  Pg. [phone]  .

## 2020-06-14 NOTE — ANESTHESIA PREPROCEDURE EVALUATION
Anesthesia Pre-Procedure Evaluation    Patient: Abhijeet Mccauley   MRN:     [id number] Gender:   male   Age:    59 year old :      1960        Preoperative Diagnosis: * No pre-op diagnosis entered *   Procedure(s):  CYSTOSCOPY, WITH RETROGRADE PYELOGRAM AND URETERAL STENT INSERTION     LABS:  CBC:   Lab Results   Component Value Date    WBC 19.1 (H) 2020    WBC 28.5 (H) 2020    HGB 8.7 (L) 2020    HGB 8.3 (L) 2020    HCT 28.9 (L) 2020    HCT 27.4 (L) 2020     (L) 2020     2020     BMP:   Lab Results   Component Value Date     2020     2020    POTASSIUM 3.4 2020    POTASSIUM 3.7 2020    CHLORIDE 114 (H) 2020    CHLORIDE 113 (H) 2020    CO2 18 (L) 2020    CO2 18 (L) 2020    BUN 16 2020    BUN 15 2020    CR 1.44 (H) 2020    CR 1.39 (H) 2020     (H) 2020    GLC 79 2020     COAGS:   Lab Results   Component Value Date    PTT 33 2020    INR 1.64 (H) 2020    FIBR 323 12/10/2014     POC:   Lab Results   Component Value Date    BGM 83 2020     OTHER:   Lab Results   Component Value Date    LACT 1.4 2020    BEE 7.7 (L) 2020    PHOS 2.4 (L) 2020    MAG 2.5 (H) 2020    ALBUMIN 2.3 (L) 2020    PROTTOTAL 5.3 (L) 2020    ALT 14 2020    AST 30 2020    ALKPHOS 353 (H) 2020    BILITOTAL 1.7 (H) 2020    LIPASE 192 2020    STEW Canceled, Test credited 2020    TSH 0.94 2018    CRP 33.8 (H) 03/15/2019    SED 73 (H) 03/15/2019        Preop Vitals    BP Readings from Last 3 Encounters:   20 (!) 84/55   20 99/52   20 120/50    Pulse Readings from Last 3 Encounters:   20 85   20 87   20 67      Resp Readings from Last 3 Encounters:   20 23   20 16   20 16    SpO2 Readings from Last 3 Encounters:   20 100%   20  "98%   05/30/20 95%      Temp Readings from Last 1 Encounters:   06/13/20 38.4  C (101.2  F) (Axillary)    Ht Readings from Last 1 Encounters:   06/12/20 1.676 m (5' 6\")      Wt Readings from Last 1 Encounters:   06/13/20 82.1 kg (181 lb)    Estimated body mass index is 29.21 kg/m  as calculated from the following:    Height as of an earlier encounter on 6/12/20: 1.676 m (5' 6\").    Weight as of this encounter: 82.1 kg (181 lb).     LDA:  Peripheral IV 06/13/20 Right Lower forearm (Active)   Site Assessment [facility] 06/13/20 1600   Line Status Infusing 06/13/20 1600   Phlebitis Scale 0-->no symptoms 06/13/20 1600   Infiltration Scale 0 06/13/20 1600   Number of days: 0       Peripheral IV 06/13/20 Left Upper forearm (Active)   Site Assessment [facility] 06/13/20 1600   Line Status Infusing 06/13/20 1600   Phlebitis Scale 0-->no symptoms 06/13/20 1600   Infiltration Scale 0 06/13/20 1600   Number of days: 0       Peripheral IV 06/13/20 Right;Anterior;Proximal Upper forearm (Active)   Site Assessment [facility] 06/13/20 2143   Line Status Saline locked 06/13/20 2143   Phlebitis Scale 0-->no symptoms 06/13/20 2143   Infiltration Scale 0 06/13/20 2143   Extravasation? No 06/13/20 2143   Dressing Intervention New dressing  06/13/20 2143   Number of days: 0       Airway - Adult/Peds 7.5 endotracheal tube (Active)   Site Appearance Clean and dry 06/13/20 2000   Secured By Commercial tube coronel 06/13/20 2000   Secured at (cm) to lip 24 cm 06/13/20 2000   Cuff Pressure - Type minimal occluding volume 06/13/20 2000   Tube Care/Reposition repositioned tube left side of mouth 06/13/20 1600   Bite Block Secure and Patent 06/13/20 2000   Safety Measures manual resuscitator at bedside 06/13/20 2000   Number of days: 1       NG/OG/NJ Tube Orogastric 18 fr Left mouth (Active)   Site Description [facility] 06/13/20 2020   Status Suction-low intermittent 06/13/20 2020   Drainage Appearance Brown 06/13/20 2020   Placement Confirmation [place] unchanged " 06/13/20 2020   [place] (cm marking) at nare/mouth 54 cm 06/13/20 2020   Container Amount 200 mL 06/13/20 1649   Output (ml) 50 ml 06/13/20 1649   Number of days: 1       Urethral Catheter Non-latex (Active)   Tube Description Positional 06/13/20 2020   Catheter Care Catheter wipes;Done 06/13/20 2020   Collection Container Standard 06/13/20 2020   Securement Method Securing device (Describe) 06/13/20 2020   Rationale for Continued Use Strict 1-2 Hour I&O 06/13/20 2020   Urine Output 75 mL 06/13/20 2020   Number of days: 1        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)     s/p chemo, no bone marrow transplant     Chronic obstructive pulmonary disease 5/17/2018     COPD (chronic obstructive pulmonary disease) (H)      History of pulmonary embolus (PE)      Hypertension      PUD (peptic ulcer disease)      Tobacco dependence      Ulcerative colitis (H)       Past Surgical History:   Procedure Laterality Date     ARTHROPLASTY HIP Left 5/29/2020    Procedure: ARTHROPLASTY, HIP, TOTAL;  Surgeon: Carlton Jones MD;  Location: [facility] OR     AS TOTAL KNEE ARTHROPLASTY Left      BONE MARROW BIOPSY, BONE SPECIMEN, NEEDLE/TROCAR N/A 6/12/2018    Procedure: BIOPSY BONE MARROW;  Bone Marrow Biopsy;  Surgeon: Demar Sapp MD;  Location: [facility]     ESOPHAGOSCOPY, GASTROSCOPY, DUODENOSCOPY (EGD), COMBINED N/A 2/8/2018    Procedure: COMBINED ESOPHAGOSCOPY, GASTROSCOPY, DUODENOSCOPY (EGD), BIOPSY SINGLE OR MULTIPLE;  gastroscopy with biopsies;  Surgeon: Raz Cobb MD;  Location: [facility]     ESOPHAGOSCOPY, GASTROSCOPY, DUODENOSCOPY (EGD), COMBINED N/A 3/18/2018    Procedure: COMBINED ESOPHAGOSCOPY, GASTROSCOPY, DUODENOSCOPY (EGD);;  Surgeon: Raz Cobb MD;  Location: [facility]     ESOPHAGOSCOPY, GASTROSCOPY, DUODENOSCOPY (EGD), COMBINED N/A 2/28/2020    Procedure:  "ESOPHAGOGASTRODUODENOSCOPY, WITH BIOPSY;  Surgeon: Chente Mclaughlin DO;  Location: [facility]     LACERATION REPAIR Right     Right leg     PHACOEMULSIFICATION WITH STANDARD INTRAOCULAR LENS IMPLANT Left 7/1/2019    Procedure: cataract removal with implant.;  Surgeon: Adrian Oliveira MD;  Location: [facility]     PHACOEMULSIFICATION WITH STANDARD INTRAOCULAR LENS IMPLANT Right 7/29/2019    Procedure: Cataract removal with implant.;  Surgeon: Adrian Oliveira MD;  Location: [facility]      Allergies   Allergen Reactions     Blood Transfusion Related (Informational Only)      Patient has a history of a clinically significant antibody against RBC antigens.  A delay in compatible RBCs may occur.     Famotidine Unknown and Other (See Comments)     Severe abdominal cramps     Cyclobenzaprine Hives     Methocarbamol Other (See Comments)     Made pt's \"face break out\"     Pepcid Cramps     Severe abdominal cramps     Vancomycin Other (See Comments)     hallucinations     Vfend Other (See Comments)     IV - cold sweats, Iron tablet makes him nauseated and stomach ached     Hydrocodone-Acetaminophen Rash        Anesthesia Evaluation     . Pt has had prior anesthetic. Type: General    No history of anesthetic complications          ROS/MED HX    ENT/Pulmonary: Comment: intubated    (+)COPD, , . .    Neurologic: Comment: inflammatory demyelinating polyneuropathy  Sedated with precedex      Cardiovascular:     (+) hypertension----. : . . . :. .       METS/Exercise Tolerance:     Hematologic:  - neg hematologic  ROS       Musculoskeletal: Comment: left hip replacement 2 weeks ago    R rib fractures          GI/Hepatic: Comment: cirrhosis w/ esophageal varices  UC        Renal/Genitourinary: Comment: concern for obstructive urosepsis on meropenem    (+) chronic renal disease, type: CRI,       Endo:  - neg endo ROS       Psychiatric: Comment: ETOH withdrawal        Infectious Disease:   (+) Other Infectious Disease " concern for urosepsis      Malignancy:      - no malignancy   Other:    - neg other ROS                     PHYSICAL EXAM:   Mental Status/Neuro: Sedation (Iatrogenic)   Airway: Facies: Feasible  Mallampati: Not Assessed  Mouth/Opening: Not Assessed  TM distance: Not Assessed  Neck ROM: Not Assessed  Airway Device: ETT   Respiratory: Auscultation: CTAB     Resp. Rate: Normal     Resp. Effort: Normal      CV:   Rate: Tachy   Comments: Patient tachycardic and hypotensive due to presumed urosepsis                     Assessment:   ASA SCORE: 5 emergent   H&P: History and physical reviewed and following examination; no interval change.   Smoking Status:  Active Smoker   NPO Status: NPO Appropriate     Plan:   Anes. Type:  General   Pre-Medication: None   Induction:  Mask   Airway: ETT; Oral   Access/Monitoring: PIV; Central Access/Port present; 2nd PIV; A-Line   Maintenance: Balanced     Postop Plan:   Postop Pain: Opioids  Postop Sedation/Airway: Not planned  Disposition: ICU     PONV Management:   Adult Risk Factors:, Postop Opioids     CONSENT: Deferred (Emergency)   Plan and risks discussed with: Not Applicable   Blood Products: N/a       Comments for Plan/Consent:  Patient is a 59 year old male who recently underwent nephrostomy tube placement and continues to have significant hemodynamic instability in the setting of presumed urosepsis.  Plan is direct transport to OR for emergent cystoscopy and stent placement in the setting of failed nephrostomy.  ETT in place, patient will have CVC placed in ICU                 Musa Montalvo MD

## 2020-06-14 NOTE — BRIEF OP NOTE
[facility], [place]    Brief Operative Note    Pre-operative diagnosis: * No pre-op diagnosis entered *  Post-operative diagnosis Same as pre-operative diagnosis    Procedure: Procedure(s):  CYSTOSCOPY, WITH RETROGRADE PYELOGRAM AND URETERAL STENT INSERTION  Surgeon: Surgeon(s) and Role:     * Renita Lino MD - Primary     * Adrian Hale MD - Assisting     * Andreia Erickson MD - Resident - Assisting  Anesthesia: General   Estimated blood loss: Minimal  Drains:  7 x multilength ureteral stent, left, 16 Fr catheter  Specimens: * No specimens in log *  Findings:   See op note.  Complications: None.  Implants:   Implant Name Type Inv. Item Serial No.  Lot No. LRB No. Used Action   STENT URETERAL PERCUFLEX PLUS [id number] Stent STENT URETERAL PERCUFLEX PLUS [id number]  [email or web address] [place] [id number] Left 1 Implanted

## 2020-06-14 NOTE — PLAN OF CARE
Major Shift Events: Hypotensive episode post failed PNT placement in OR. LR bolus 2L given. Stent placement in OR by 2300. Post-op: Norepinephrine titrated for MAP>65. Opens eyes with vigorous stimulation once/ not following commands- precedex off since 0517. Phos+, Mg & K replaced per protocol- recheck K & phos+ WNL. Lactic 4.0- recheck 2.6. Chong with pink/cloudy urine post stent placement- 30 ml/hr UOP. FiO2 50/ 24/ 430/10. LR at 125 ml/hr. WBC 49.1- on merrem.  Plan: Contiue POC, monitor BP, ABG, labs & notify team w/ acute changes.   For vital signs and complete assessments, please see documentation flowsheets.

## 2020-06-14 NOTE — PROGRESS NOTES
Post procedure: PNT failed.    2020:  Pt arrived from OR post failed PNT placement per IR. BP 78/ 50/ MAP 55, Tmax 101.2- MD notified- LR 1L bolus ordered.     2045:  Post LR bolus, BP 74/52, MAP 48- MD notified. Kj ordered/ LR 1L X1 ordered & pending central line per Tati ROONEY.     2135:   Phenylephrine started & LR infused over 10- 15 minutes- BP 91/57, MAP 64.     2150:  Pt taken down to OR for surgery.     Nupur Toro RN

## 2020-06-14 NOTE — OP NOTE
Physician Attestation   I was present for the entire procedure between opening and closing.    Renita Lino  Date of Service (when I saw the patient): 6/13/20 June 13, 2020    PREOPERATIVE DIAGNOSIS:   1. Sepsis of urinary origin  2. Obstructing left renal stone  3. Failed attempt at nephrostomy tube earlier in the day    POSTOPERATIVE DIAGNOSIS:   1. Sepsis of urinary origin  2. Obstructing left renal stone  3. Failed attempt at nephrostomy tube earlier in the day    PROCEDURES PERFORMED:   1. Cystourethroscopy.   2. Left retrograde pyelogram with intraoperative interpretation of images  3. Placement of left ureteral stent, 7 x multilength (22cm-30cm)    STAFF SURGEON: Renita Lino MD    ASSISTANT: Andreia Erickson MD    ANESTHESIA: General    ESTIMATED BLOOD LOSS: 1 mL.     COMPLICATIONS: None.     SIGNIFICANT FINDINGS: Good curl in upper pole calyx and bladder with ureteral stent.     BRIEF OPERATIVE INDICATIONS: Abhijeet Mccauley is a 59 year old male currently intubated in the hospital after being found down. He was transferred from OSH and found to have GNR bactermia, leukocytosis, and imaging with longstanding left renal pelvis stone that appeared to obstruct upper pole calyces on imaging. Due to the large staghorn stone obstructing the upper pole of the kidney and concern that a wire would not be able to get past this stone, IR was consulted. After extensive attempts by IR to place a tube, they were able to only aspirate but not definitively drain the obstructed upper pole calyces. Mr. Mccauley was transferred to ICU where he soon became febrile, tachycardic, hypotensive. UA from the upper pole was very much positive. Central line and pressers were started. Family was consented for emergent attempt at ureteral stent placement. After discussion of risks, benefits and alternatives with family (sister) we brought Mr. Mccauley to the OR.    DESCRIPTION OF PROCEDURE:  A timeout was taken to  confirm correct patient, procedure and laterality. The patient was already intubated. He was placed in lithotomy, prepped and draped in the usual sterile fashion ensuring all pressure points were padded. We started with a  film which noted a very small amount of residual contrast in the upper pole from the IR procedure as well as likely suggestion of stone in renal pelvis on imaging. We inserted a 22-Azerbaijani rigid cystoscope sheath and 30-degree angle lens. The urethra was unremarkable. There was some debris in the bladder. There were no tumors, stones or diverticuli appreciated.  The bilateral ureteral orifices were in their normal orthotopic positions.    A 5 F open ended ureteral catheter was advanced into the left ureteral orifice with the aid of a sensor guidewire. The wire was passed up to the renal pelvis. The 5 Fr was passed over the wire until it reached the renal pelvis. The sensor wire was used to attempt to get into the upper pole but this kept getting deflected into a mid pole calyx. An angled glide wire was then used after advancing the 5Fr a little farther and this did appear to go into the upper pole and curl. The 5Fr was passed over the glide wire and the wire was removed. We performed a gentle retrograde pyelogram which showed a dilated upper pole with likely debris within. The contrast also exited the upper pole alongside the 5Fr and highlighted the mid and lower pole and renal pelvis convincing us that this was in the upper pole. We again reviewed the retrograde and CT imaging and this did appear to conform to the configuration of the calyxes on CT scan. We obtained a 7Fr stent, because the longest stent was 28cm we had to use the multilength stent to ensure the curl got above the stone. We confirmed good calyceal position on fluoroscopy and saw curl in the bladder visually. A urethra catheter was then placed for maximal decompression    PLAN:   - Transfer back to ICU for ongoing supportive  cares per critical care team

## 2020-06-14 NOTE — PROGRESS NOTES
CLINICAL NUTRITION SERVICES - ASSESSMENT NOTE     Nutrition Prescription    RECOMMENDATIONS FOR MDs/PROVIDERS TO ORDER:  Continue electrolyte replacements.     Malnutrition Status:    Unable to determine due to inability to assess all parameters of malnutrition     Recommendations already ordered by Registered Dietitian (RD):  Impact Peptide @ 10 ml/hr with advancement to 20 ml/hr.   --Will not advance past 20 mL/hr until approved by team.   --Do not advance if K+, Mg++ below normal or Po4 <1.9. Hold at current rate and replace electrolytes to WNL. Once normal, may resume advancement   --Free water flush of 30 mL q 4 hours for tube patency = 180 mL free water (flush + free water TF = 1104 mL/day)  --Start Certavite (15 mL) MVI daily   --Aspiration precautions with gastric feeds  --Ordered CRP with immune modulating formula (6/15) and recommend checking weekly     --Discontinued thera-he-m with start of certavite    Future/Additional Recommendations:  1. Agree with folic acid and Thiamine- continue     2. Monitor tolerance to enteral nutrition and ability to advance to goal volume.   Impact Peptide @ goal 50 ml/hr (1200 ml/day) to provide 1800 kcals (26 kcal/kg/day), 113 g PRO (1.6 gm/kg/day), 924 ml free H2O, 77 g Fat (50% from MCTs), 168 g CHO and no Fiber daily.    3. When patient becomes stable, consider changing to standard formula if unable to advance diet.   Recommend Isosource 1.5 @ goal 50 ml/hr + 2 packets prosource (1200 ml/day) to provide 1880 kcals (27 kcal/kg/day), 104 g PRO (1.5 g/kg/day), 912 ml free H2O, 211 g CHO and 18 g Fiber daily.       REASON FOR ASSESSMENT  Abhijeet Mccauley is a/an 59 year old male assessed by the dietitian for Provider Order - Registered Dietitian to Assess and Order TF per Medical Nutrition Therapy Protocol    CLINICAL HISTORY   PMH inflammatory demyelinating polyneuropathy, UC, alcohol abuse, THC, COPD, CKD, liver cirrhosis, MELD score 16, esophageal varices, recent  "history of R hip replacement 2 weeks ago  Fall on 6/12: known injuries include multiple rib fractures, urosepsis, possible seizure and possible etoh withdrawal     Intubated on 6/12 with OG placement     NUTRITION HISTORY  Unable to obtain from patient. No previous dietitian notes.     CURRENT NUTRITION ORDERS  Diet: NPO  Intake/Tolerance: N/A     LABS  Na 140 (WNL), K+ 4.3 (WNL), Mg 1.9 (WNL), Po4 3.1 (WNL)  Creatinine 1.88 (H)  Glucose 120 (H)  Urine ketones negative (6/13)     MEDICATIONS  Folic acid 1 mg, Thiamine 200 mg TID, Thera-Kan-M  Novolog (held)   Protonix  LR @ 125 mL/hr   Levophed (0.12 mg/kg/min)     ANTHROPOMETRICS  Height: 167.6 cm (5' 6\")  Most Recent Weight: 85.6 kg (188 lb 11.4 oz)    IBW: 64.5 kg  BMI: Obesity Grade I BMI 30-34.9  Weight History:   Wt Readings from Last 15 Encounters:   06/14/20 85.6 kg (188 lb 11.4 oz)   06/12/20 83.9 kg (185 lb)   05/29/20 85.7 kg (189 lb)   05/12/20 85.7 kg (189 lb)   04/23/20 83.3 kg (183 lb 9.6 oz)   03/17/20 86.7 kg (191 lb 3.2 oz)   03/05/20 86.4 kg (190 lb 8 oz)   02/28/20 85.2 kg (187 lb 13.3 oz)   02/26/20 85.5 kg (188 lb 8 oz)   02/19/20 89.4 kg (197 lb 3.2 oz)   01/10/20 87.5 kg (193 lb)   12/20/19 84.8 kg (187 lb)   12/05/19 85.2 kg (187 lb 14.4 oz)   11/11/19 82.8 kg (182 lb 8 oz)   10/16/19 84.8 kg (187 lb)     Dosing Weight: 69 kg (adjusted)    ASSESSED NUTRITION NEEDS  Estimated Energy Needs: 3184-7182 kcals/day (25- 30 kcals/kg)  Justification: Maintenance  Estimated Protein Needs:  grams protein/day (1.3 - 2 grams of pro/kg)  Justification: Hypercatabolism with acute illness and Post-op  Estimated Fluid Needs: 9494-7409 mL/day (1 mL/kcal)   Justification: Maintenance and Per provider pending fluid status    PHYSICAL FINDINGS  Unable to obtain in-person nutrition history or nutrition focused physical assessment (NFPA) from patient as the number of staff going into rooms is restricted to limit exposure.      MALNUTRITION  % Intake: Unable " to assess  % Weight Loss: None noted  Subcutaneous Fat Loss: Unable to assess  Muscle Loss: Unable to assess  Fluid Accumulation/Edema: Mild in hands   Malnutrition Diagnosis: Unable to determine due to inability to complete all parameters of malnutrition     NUTRITION DIAGNOSIS  Inadequate oral intake related to inability to consume nutrition as evidenced by intubation/sedation with consult placed for enteral nutrition support       INTERVENTIONS  Implementation  Nutrition Education: Not appropriate at this time due to patient condition   Collaboration with other Providers- ICU team  Enteral Nutrition - Initiate  Feeding tube flush     Goals  Total avg nutritional intake to meet a minimum of 25 kcal/kg and 1.3 g PRO/kg daily (per dosing wt 69 kg).     Monitoring/Evaluation  Progress toward goals will be monitored and evaluated per protocol.    Keli Boothe RD, JOSSY  Weekend pager: [phone]

## 2020-06-14 NOTE — ANESTHESIA POSTPROCEDURE EVALUATION
Anesthesia POST Procedure Evaluation    Patient: Abhijeet Mccauley   MRN:     [id number] Gender:   male   Age:    59 year old :      1960        Preoperative Diagnosis: * No pre-op diagnosis entered *   Procedure(s):  CYSTOSCOPY, WITH RETROGRADE PYELOGRAM AND URETERAL STENT INSERTION   Postop Comments: No value filed.     Anesthesia Type: General       Disposition: ICU            ICU Sign Out: Anesthesiologist/ICU physician sign out WAS performed   Postop Pain Control: Uneventful            Sign Out: Well controlled pain   PONV: No   Neuro/Psych: Uneventful            Sign Out: Acceptable/Baseline neuro status   Airway/Respiratory: Uneventful            Sign Out: AIRWAY IN SITU/Resp. Support               Airway in situ/Resp. Support: ETT                 Reason: Planned Pre-op   CV/Hemodynamics: Uneventful            Sign Out: Acceptable CV status   Other NRE: NONE   DID A NON-ROUTINE EVENT OCCUR? No    Event details/Postop Comments:  I accompanied the Resident with the patient to the ICU.  Prior to transport the patient was disconnected from the OR monitors and placed on transport monitors.  The patient was moved to the ICU bed and placed on 10L supplemental O2 and ventilation was maintained with an ambu bag.  Transport to the ICU was uneventful and required no additional boluses of medications and the patient remained stable on arrival to the ICU.  Once in the ICU, the patient was connected to the ICU monitors, the ICU ventilator, and the Resident and myself provided sign out to the ICU team regarding the patient's history, operative course, fluids and blood products administered, IV access, airway management, and current infusions.  All questions were answered.           Last Anesthesia Record Vitals:  CRNA VITALS  2020 2253 - 2020 2341      2020             EKG:  Sinus rhythm          Last PACU Vitals:  No vitals data found for the desired time range.        Electronically Signed By: Musa  TIMBO Montalvo MD, June 13, 2020, 11:41 PM

## 2020-06-14 NOTE — PLAN OF CARE
Neuro: Somnolent. Arouse with vigorous stimulation. Occasionally to voice. Withdraws from all extremities. Doesn't follow commands.Pupils equal and reactive. EEG started.  Sclera jaundice.  Cardiac: SR. HR 60-80s. Levo and Vasopressin titrated. See MAR.  Respiratory:  CMV. Fio2 40%. Peep 7. . Resp 18. Prado thick secretions. PS x1.   GI: TF at 10 mL/hr via OG. Advance to 20mL/hr at 2100. Active bowel sounds. Soft bm x2.   : Marginal adequate UOP via ga cath. Penis swollen and blister noted. MD notified. Will continue to monitor.    IV:  RIJ, PIV x2, L rad art line.  Skin: Ecchymosis noted R hip, L Hip incision with dressing.    PLAN:  Continue to monitor closely. Notify MD with any acute changes.

## 2020-06-14 NOTE — ANESTHESIA POSTPROCEDURE EVALUATION
Anesthesia POST Procedure Evaluation    Patient: Abhijeet Mccauley   MRN:     [id number] Gender:   male   Age:    59 year old :      1960        Preoperative Diagnosis: Kidney stone [N20.0]   Procedure(s):  CREATION, NEPHROSTOMY, PERCUTANEOUS   Postop Comments: No value filed.     Anesthesia Type: No value filed.       Disposition: ICU            ICU Sign Out: Unable to perform physician to physician sign out   Postop Pain Control: Uneventful            Sign Out: Well controlled pain   PONV: No   Neuro/Psych: Uneventful            Sign Out: Acceptable/Baseline neuro status   Airway/Respiratory: Uneventful            Sign Out: AIRWAY IN SITU/Resp. Support               Airway in situ/Resp. Support: ETT   CV/Hemodynamics: Uneventful            Sign Out: Acceptable CV status   Other NRE:    DID A NON-ROUTINE EVENT OCCUR?          Last Anesthesia Record Vitals:  CRNA VITALS  20203 - 2020             EKG:  Sinus tachycardia          Last PACU Vitals:  Vitals Value Taken Time   BP 84/55 2020  9:50 PM   Temp 38.4  C (101.2  F) 2020  8:30 PM   Pulse 85 2020  9:50 PM   Resp 23 2020  9:50 PM   SpO2 100 % 2020  9:52 PM   Temp src     NIBP     Pulse     SpO2     Resp     Temp     Ht Rate     Temp 2     Vitals shown include unvalidated device data.      Electronically Signed By: Musa Montalvo MD, 2020, 9:53 PM

## 2020-06-14 NOTE — PROGRESS NOTES
Patient Name: Abhijeet Mccauley  Medical Record Number: [id number]  Today's Date: 6/13/2020    Procedure:  Attempted Percutaneous Nephrostomy Tube Placement  Proceduralist: Dr. Shirley Can    Procedure Start: 1800  Procedure end: 1930       Other Notes: Pt arrived to IR room 1 from . Consent reviewed. Pt denies any questions or concerns regarding procedure. Pt positioned prone and monitored per anesthesia. Pt tolerated procedure without any noted complications. Pt transferred back to .

## 2020-06-14 NOTE — CONSULTS
Social Work Services Progress Note    Hospital Day: 1  Date of Initial Social Work Evaluation: not yet completed - pt. On vent and   Not following commands  Collaborated with: chart review    Data: 59 year old pt. Who was found down in his sister's garage by home  Care RN and sent to hospital. Pt. Had a OSCAR approximately 2 weeks ago at [facility]. At that time, SW assessed and pt. Was sent home with [facility] care, RN, PT and OT. Per chart, pt. continues to live in his sister's garage, using the house bathroom as needed.  Pt has [facility] worker, Tammy Roachmings, [phone].  Pt receives:  Meals on Wheels, Homemaking services, and has had in-home modifications under this waiver.   Assessment: PT and OT have been ordered but unable to assess yet. SW will follow for needs while hospitalized and needs for discharge as appropriate.  Plan:    Discharge Plans in Progress: none    Follow up Plan: Social Work will follow. Please reconsult as needs arise    AUTUMN [place], SERGE  Text paging available through [employer] on [employer] - search SOCIAL WORK   ON CALL PAGER [phone]   UNITS 4A, 4C, 4E, 5A, 5B [phone]  UNITS 6A, 6B, 6C, 6D [phone]  UNITS 7A, 7B, 7C, 7D, 5C [phone]  ON CALL COVERAGE AFTER 1600 [phone]

## 2020-06-14 NOTE — ANESTHESIA CARE TRANSFER NOTE
Patient: Abhijeet Mccauley    Procedure(s):  CYSTOSCOPY, WITH RETROGRADE PYELOGRAM AND URETERAL STENT INSERTION    Diagnosis: * No pre-op diagnosis entered *  Diagnosis Additional Information: No value filed.    Anesthesia Type:   General     Note:  Airway :ETT  Patient transferred to:ICU  Comments: Patient transferred to ICU with ETT in place and monitors applied. VSS stable on phenylephrine gtt. No apparent complications from anesthesia. Sign out give to ICU team. ICU Handoff: Call for PAUSE to initiate/utilize ICU HANDOFF, Identified Patient, Identified Responsible Provider, Reviewed the Pertinent Medical History, Discussed Surgical Course, Reviewed Intra-OP Anesthesia Management and Issues during Anesthesia, Set Expectations for Post Procedure Period and Allowed Opportunity for Questions and Acknowledgement of Understanding      Vitals: (Last set prior to Anesthesia Care Transfer)    CRNA VITALS  6/13/2020 2253 - 6/13/2020 2338      6/13/2020             EKG:  Sinus rhythm                Electronically Signed By: Marlyn Monsivais MD  June 13, 2020  11:38 PM

## 2020-06-14 NOTE — PROGRESS NOTES
Urology  Progress Note    - Stent placed last evening after difficulty placing PNT in upper pole by IR  - Continued pressor requirements overnight  - WBC this AM 49    Exam  /58   Pulse 85   Temp 97.7  F (36.5  C) (Axillary)   Resp 24   Wt 85.6 kg (188 lb 11.4 oz)   SpO2 97%   BMI 30.46 kg/m    Intubated, sedated  Abdomen soft, non-distended  Lower extremities non-edematous bilaterally  Chong susan in appearance    UOP 5,985/977    Labs  WBC   Date Value Ref Range Status   06/14/2020 49.1 (H) 4.0 - 11.0 10e9/L Final   06/13/2020 25.5 (H) 4.0 - 11.0 10e9/L Final   06/13/2020 19.1 (H) 4.0 - 11.0 10e9/L Final      Hemoglobin   Date Value Ref Range Status   06/14/2020 9.6 (L) 13.3 - 17.7 g/dL Final   06/13/2020 9.8 (L) 13.3 - 17.7 g/dL Final   06/13/2020 8.6 (L) 13.3 - 17.7 g/dL Final      Platelet Count   Date Value Ref Range Status   06/14/2020 155 150 - 450 10e9/L Final      Creatinine   Date Value Ref Range Status   06/14/2020 1.88 (H) 0.66 - 1.25 mg/dL Final   06/13/2020 1.93 (H) 0.66 - 1.25 mg/dL Final   06/13/2020 1.44 (H) 0.66 - 1.25 mg/dL Final      Potassium   Date Value Ref Range Status   06/14/2020 4.3 3.4 - 5.3 mmol/L Final      Assessment/Plan  59 year old y/o male POD#1 s/p left ureteral stent placement for obstructing stone, urosepsis.     - Recommend CT A/P noncontrast this AM to assess stent positioning/re-evaluate upper pole hydro  - Continue catheter until afebrile/off pressors/off vent for 24 hrs  - Continue abx per primary  - Urology will continue to follow, please call with questions    Seen and examined with the chief resident and staff Dr. Lino.    Musa Jack MD  Urology PGY2      Contacting the Urology Team     Please use the following job codes to reach the Urology Team. Note that you must use an in house phone and that job codes cannot receive text pages.     On weekdays, dial [phone] (or star-star-star [phone] on the new [employer] telephones) then [phone] to reach the Adult Urology  resident or PA on call    On weekdays, dial [phone] (or star-star-star [phone] on the new [employer] telephones) then [phone] to reach the Pediatric Urology resident    On weeknights and weekends, dial [phone] (or star-star-star 777 on the new [employer] telephones) then [phone] to reach the Urology resident on call (for both Adult and Pediatrics)

## 2020-06-14 NOTE — PLAN OF CARE
4AB PT: Cancel, per discussion with RN pt is not medically appropriate for PT evaluation this AM, sedated and not following commands. Will reschedule PT evaluation for 6/15 and initiate ROM/mobility as indicated.

## 2020-06-14 NOTE — PROGRESS NOTES
INITIAL REPORT of Video-EEG Monitoring              DATE OF RECORDIN2020         I reviewed the first 2 hours of video-EEG monitoring of Abhijeet Mccauley.         The EEG during stupor was abnormal due to fcciogb-kleunjymejp-svhpvug periods of limb movement with irregular generalized delta-theta slowing and intermixture of faster alpha-beta activities bilaterally; when the patient was left unstimulated, the EEG showed rhythmic generalized delta slowing with symmetric sleep spindles.  No interictal epileptiform abnormalities and no electrographic seizures were observed.         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  This recording excludes non-convulsive status epilepticus as a possible cause of this encephalopathy.    Morgan Gutierrez M.D., [facility] [phone]

## 2020-06-14 NOTE — PROGRESS NOTES
SURGICAL ICU PROGRESS NOTE  June 14, 2020      PRIMARY TEAM: Trauma  PRIMARY PHYSICIAN: Dr. Walls     REASON FOR CRITICAL CARE ADMISSION: Intubated   ADMITTING PHYSICIAN: Dr. Dodson        ASSESSMENT: Abhijeet Mccauley is a 59 year old male with hx of alcohol abuse, liver cirrhosis MELD score 16, COPD, ulcerative colitis and renal insufficiency, 2 weeks s/p left hip replacement, presents after multiple falls, sustained R rib 3-8 fx, UA was positive, intubated in [facility] for seizures.     Today's Changes:  - check lactate q6h, CBC this afternoon  - transition to cefepime (total ABX course 14 days, end 6/26)  - EEG x24 hours per neurology  - decrease RR in attempt to facilitate respiratory drive  - start vasopressin gtt  - extend prn ativan to q4h prn  - resume precedex gtt as needed for sedation  - d/c metoprolol and Lasix given pressor needs  - nutrition consult - start trophic TFs  - CT A/P per urology     PLAN:   Neuro/ pain/ sedation:  # Possible seizure disorder  Monitor neurological status. Notify the MD for any acute changes in exam.   CIWA protocol, discontinue propofol, neurology consult, seizure precautions               - PRN ativan 1-2mg q30 min prn (10mg yesterday) -- extend to q4h prn     Sedation: Precedex gtt weaned off (0.4) -- resume if as needed for sedation  - Haloperidol 5 mg Q4H PRN for agitation     Pain:   Dilaudid 0.2 - 0.5 mg Q1H PRN  baclofen 10 mg TID  acetaminophen 975 MG TID  gabapentin 600 mg TID        RASS goal set at 0 to -1 (-4 to -5 documented)     Neurology consult  Recommendations:  - Recommend management of provoking factors for seizures overnight   - No anti-seizure medication right now   - Notify me if there is concern for additional seizures and I will reassess   - Day team to direct further seizure work-up as appropriate    - EEG for 24 hours     Home meds  Hold for now: Tramadol, Trazadone, hydroxizine, Bupropion     Pulmonary care:   VC              24 (23)/ 430/ 10 (5) /  80% (35%)    ABG @ 0400: pH 7.31 / pO2 92 / pCO2 27 / HCO3 14     Albuterol 2.5 mg PRN     Metabolic acidosis 2/2 urosepsis -- back off rate to 18, FIO2 40% to help build-up CO2 to facilitate spontaneous respirations     # Rib fractures  R rib 3-8 fx, multiple have anterior and posterior components     Look to restart home meds once he wakes up  Home meds  Albuterol, tiotropium; Montelukast, loratadine; Dulera      Cardiovascular:    # HLD  Monitor hemodynamic status.     NE gtt 0.12 mcg/kg/min   - start vasopressin gtt  Phenylephrine gtt - weaned off    MAPs 66-79  HR      Home meds  On home atorvastatin 20 mg daily  ASA 81 mg daily     -d/c metoprolol and lasix given pressor needs     GI care:   # Alcohol abuse, liver cirrhosis MELD score 16  NPO except meds, NG tube to LIS   - Furosemide 20 mg daily -- d/c as above  - Dulcolax supp 10 mg daily, PEG daily  - Thiamine and folic acid  -Last BM PTA    --nutrition consult for TFs   - start at 10mL/hr and advance to max of 20mL/hr for trophic feeds     Ammonia 71  Rifaximin 550 mg BID      Home meds  Hold on resrtating all anti hypertensives  Propranolol 20 mg BID  Spironolactone 50 mg daily      Fluids/ Electrolytes/ Nutrition:   Continue to monitor I/Os              I/O: 6.0/ 1.5 (+4.5)  LR 125mL/hr     No indication for parenteral nutrition.     Cr 1.88 (1.44)     Electrolyte replacement protocol     -lactate q6, cbc this afternoon      Endocrine:    No history of diabetes  Sliding scale for diabetes management.               BG 80s-120s      ID/ Antibiotics:  #UTI, h/o enterobacter infections  - Meropenem -- transition to cefepime at 4pm (14 day course of total antibiotics, end 6/26)  - Left superior pole calectasis s/p cystoscopy with retrograde pyelogram and ureteral stent insertion 6/13  - Urology following:   - Recommend CT A/P noncontrast this AM to assess stent positioning/re-evaluate upper pole hydro   - Continue catheter until afebrile/off  pressors/off vent for 24 hrs   - Continue abx per primary     WBC 49.1 (19.1)  Febrile 101.2    Bcx 6/12: GNR (1 of 2)  BCx 6/14 NGTD  Ucx NGTD     Heme:        Hgb 9.6 (8.7)  INR 1.64  PTT 33         Prophylaxis:    Mechanical prophylaxis for DVT  SubQ heparin TID          Miscellaneous:    PT, OT, social work for dispo       Lines/ tubes/ drains:  ETT, ng tube, PIV x2, urinary catheter      Disposition:  Surgical ICU.     Patient seen and discussed with staff.    Dewey Song MD  Surgery     ====================================    TODAY'S SUBJECTIVE/INTERVAL HISTORY:   - Febrile to 101.2, given IVF    OBJECTIVE:     Temp:  [97  F (36.1  C)-101.2  F (38.4  C)] 97.7  F (36.5  C)  Pulse:  [] 85  Heart Rate:  [] 77  Resp:  [23-30] 24  BP: ()/(26-91) 101/58  MAP:  [56 mmHg-82 mmHg] 65 mmHg  Arterial Line BP: ()/(22-58) 99/47  FiO2 (%):  [35 %-100 %] 50 %  SpO2:  [77 %-100 %] 97 %  Ventilation Mode: CMV/AC  (Continuous Mandatory Ventilation/ Assist Control)  FiO2 (%): 50 %  Rate Set (breaths/minute): 24 breaths/min  Tidal Volume Set (mL): 430 mL  PEEP (cm H2O): 10 cmH2O  Pressure Support (cm H2O): 7 cmH2O  Oxygen Concentration (%): 80 %  Resp: 24      I/O last 3 completed shifts:  In: 5926.9 [I.V.:2926.9; IV Piggyback:3000]  Out: 1515 [Urine:1405; Emesis/NG output:100; Blood:10]    General/Neuro: Intubated and sedated, does not awake to voice  Pulm: Mechanically ventilated  Abd: soft; NT, ND  Extremities: no edema  Skin: warm and well-perfused.     LABS:   Arterial Blood Gases   Recent Labs   Lab 06/14/20  0359 06/13/20  2340 06/13/20  2244   PH 7.31* 7.25* 7.18*   PCO2 27* 31* 42   PO2 92 93 71*   HCO3 14* 14* 16*     Complete Blood Count   Recent Labs   Lab 06/14/20  0359 06/13/20  2244 06/13/20  2213 06/13/20  0443 06/12/20 2110   WBC 49.1*  --  25.5* 19.1* 28.5*   HGB 9.6* 9.8* 8.6* 8.7* 8.3*     --  109* 129* 158     Basic Metabolic Panel  Recent Labs   Lab 06/14/20  0359  06/13/20  2244 06/13/20  2213 06/13/20 0443 06/12/20 2110    139 141 141 141   POTASSIUM 4.3 3.2* 3.3* 3.4 3.7   CHLORIDE 114*  --  115* 114* 113*   CO2 15*  --  16* 18* 18*   BUN 20  --  18 16 15   CR 1.88*  --  1.93* 1.44* 1.39*   * 84 79 134* 79     Liver Function Tests  Recent Labs   Lab 06/13/20 0443 06/12/20 2209 06/12/20 2110 06/12/20  1005   AST 30 Canceled, Test credited Canceled, Test credited 38   ALT 14 16 16 19   ALKPHOS 353*  --  340* 464*   BILITOTAL 1.7*  --  2.0* 1.9*   ALBUMIN 2.3*  --  2.3* 2.9*   INR  --   --  1.64* 1.47*     Pancreatic Enzymes  Recent Labs   Lab 06/12/20  1005   LIPASE 192     Coagulation Profile  Recent Labs   Lab 06/12/20 2110 06/12/20  1005   INR 1.64* 1.47*   PTT 33 37         IMAGING:   Recent Results (from the past 24 hour(s))   XR Surgery HENRIETTA L/T 5 Min Fluoro w Stills    Narrative    XR SURGERY HENRIETTA FLUORO LESS THAN 5 MIN W STILLS 6/13/2020 11:46 PM     COMPARISON: 6/13/2020    HISTORY: Cystoscopy    NUMBER OF IMAGES ACQUIRED: 6    VIEWS: AP    FLUOROSCOPY TIME: 1.75 minute(s)      Impression    IMPRESSION: Fluoroscopy provided to the urology service.    ANGEL DAVID MD   XR Chest Port 1 View    Narrative    EXAM: XR CHEST PORT 1 VW 6/14/2020 12:10 AM      HISTORY: line placement, hypoxemia..    COMPARISON: 6/12/2020.     TECHNIQUE: Frontal supine view of the chest.    FINDINGS: Right internal jugular central venous catheter tip projects  near the right brachiocephalic vein. Endotracheal tube tip projects  over the low thoracic trachea. Enteric tube proximal sidehole projects  at the gastric cardia with tip collimated beyond view inferiorly.    Cardiomediastinal silhouette is stable with prominent left pericardial  fat pad. Low lung volumes with increased vascular crowding, perihilar  attenuation, and streaky basilar/retrocardiac opacities. Ill-defined  linear opacity over the central right midlung overlying posterior  right fifth rib fracture.  Multiple right-sided rib fractures again  noted.      Impression    IMPRESSION:   Right internal jugular central venous catheter tip projects over the  right brachiocephalic vein.  Increased parahilar and basilar opacities, likely edema. Consider  atelectasis.        I have personally reviewed the examination and initial interpretation  and I agree with the findings.    ANGEL DAVID MD

## 2020-06-14 NOTE — BRIEF OP NOTE
[facility], [place]    Brief Operative Note    Pre-operative diagnosis: Left superior pole caliectasis  Post-operative diagnosis Same as pre-operative diagnosis    Procedure: Failed left PNT  Staff IR: Iris Barkley MD  Fellow: Ijeoma Watson MD  Anesthesia: General   Estimated blood loss: Minimal  Drains: None  Specimens: urine from superior pole calyx  Findings:   Significant difficulty accessing superior pole calyx.  Could not place PNT.  .  Complications: None.  Implants: None

## 2020-06-14 NOTE — PROCEDURES
BEDSIDE PROCEDURE - CENTRAL LINE     Date of Procedure: 06/13/2020      RESIDENT: Jessica Saravia     Staff: Dr. Olmos     LOCATION OF CENTRAL LINE: Right internal jugular       SEDATION: precedex gtt      ESTIMATED BLOOD LOSS: 10 cc      COMPLICATIONS: None.        INDICATIONS FOR PROCEDURE: Central venous access     DESCRIPTION OF PROCEDURE:  The patient was placed in a dependent position appropriate for central line placement based on the vein to be cannulated. The patient s right internal jugular was prepped and draped in sterile fashion. Each lumen of a triple lumen central venous catheter was evacuated of air prior to insertion and flushed with sterile saline. The catheter was introduced into the right internal jugular vein using the Seldinger technique with serial dilation and under ultrasound guidance. The catheter was threaded smoothly over the guide wire and appropriate blood return was obtained. The catheter was advanced to the 16-cm annamaria. The catheter was then sutured in place to the skin and a sterile dressing with biopatch applied. CXR has been ordered and line position will be confirmed. Dr. Olmos was present for the entire procedure.      Jessica Saravia MD PGY3  General Surgery

## 2020-06-14 NOTE — ANESTHESIA PROCEDURE NOTES
Arterial Line Procedure Note  Staff -   Anesthesiologist:  Musa Montalvo MD  Resident/Fellow: Marlyn Monsivais MD    Performed By: resident  Procedure performed by resident/CRNA in presence of a teaching physician.          Location: In OR After Induction  Procedure Start/Stop Times:     patient identified, IV checked, site marked, risks and benefits discussed, informed consent, monitors and equipment checked, pre-op evaluation and at physician/surgeon's request      Correct Patient: Yes      Correct Position: Yes      Correct Site: Yes      Correct Procedure: Yes      Correct Laterality:  Yes    Site Marked:  Yes  Line Placement:     Procedure:  Arterial Line    Insertion Site:  Radial    Insertion laterality:  Left    Skin Prep: Chloraprep      Patient Prep: patient draped, mask, sterile gloves, sterile gown, hat and hand hygiene      Local skin infiltration:  None    Ultrasound Guided?: Yes      Artery evaluated via ultrasound confirming patency.   Using realtime imaging, the artery was punctured and the needle was observed entering the artery.      A permanent image is NOT entered into the patient's record.      Catheter size:  20 gauge, 12 cm    Cath secured with: suture      Dressing:  Tegaderm    Complications:  None obvious    Arterial waveform: Yes      IBP within 10% of NIBP: Yes

## 2020-06-15 ENCOUNTER — APPOINTMENT (OUTPATIENT)
Dept: NEUROLOGY | Facility: CLINIC | Age: 60
End: 2020-06-15
Payer: COMMERCIAL

## 2020-06-15 ENCOUNTER — APPOINTMENT (OUTPATIENT)
Dept: GENERAL RADIOLOGY | Facility: CLINIC | Age: 60
End: 2020-06-15
Attending: STUDENT IN AN ORGANIZED HEALTH CARE EDUCATION/TRAINING PROGRAM
Payer: COMMERCIAL

## 2020-06-15 LAB
ANION GAP SERPL CALCULATED.3IONS-SCNC: 7 MMOL/L (ref 3–14)
BASE DEFICIT BLDA-SCNC: 8.5 MMOL/L
BUN SERPL-MCNC: 27 MG/DL (ref 7–30)
CALCIUM SERPL-MCNC: 7.7 MG/DL (ref 8.5–10.1)
CHLORIDE SERPL-SCNC: 115 MMOL/L (ref 94–109)
CO2 SERPL-SCNC: 17 MMOL/L (ref 20–32)
CREAT SERPL-MCNC: 1.69 MG/DL (ref 0.66–1.25)
CRP SERPL-MCNC: 190 MG/L (ref 0–8)
ERYTHROCYTE [DISTWIDTH] IN BLOOD BY AUTOMATED COUNT: ABNORMAL % (ref 10–15)
GFR SERPL CREATININE-BSD FRML MDRD: 43 ML/MIN/{1.73_M2}
GLUCOSE BLDC GLUCOMTR-MCNC: 115 MG/DL (ref 70–99)
GLUCOSE BLDC GLUCOMTR-MCNC: 128 MG/DL (ref 70–99)
GLUCOSE BLDC GLUCOMTR-MCNC: 139 MG/DL (ref 70–99)
GLUCOSE BLDC GLUCOMTR-MCNC: 148 MG/DL (ref 70–99)
GLUCOSE BLDC GLUCOMTR-MCNC: 175 MG/DL (ref 70–99)
GLUCOSE SERPL-MCNC: 181 MG/DL (ref 70–99)
HBA1C MFR BLD: NORMAL % (ref 0–5.6)
HBV SURFACE AG SERPL QL IA: NONREACTIVE
HCO3 BLD-SCNC: 16 MMOL/L (ref 21–28)
HCT VFR BLD AUTO: 27.3 % (ref 40–53)
HGB BLD-MCNC: 8.2 G/DL (ref 13.3–17.7)
HIV 1+2 AB+HIV1 P24 AG SERPL QL IA: NONREACTIVE
HIV EXPOSURE DRAW AND HOLD: NORMAL
HIV1+2 AB SPEC QL IA.RAPID: NONREACTIVE
LACTATE BLD-SCNC: 1.2 MMOL/L (ref 0.7–2)
LACTATE BLD-SCNC: 1.4 MMOL/L (ref 0.7–2)
LACTATE BLD-SCNC: 2.4 MMOL/L (ref 0.7–2)
MAGNESIUM SERPL-MCNC: 2.3 MG/DL (ref 1.6–2.3)
MCH RBC QN AUTO: 31.8 PG (ref 26.5–33)
MCHC RBC AUTO-ENTMCNC: 30 G/DL (ref 31.5–36.5)
MCV RBC AUTO: 106 FL (ref 78–100)
O2/TOTAL GAS SETTING VFR VENT: 40 %
PCO2 BLD: 28 MM HG (ref 35–45)
PH BLD: 7.37 PH (ref 7.35–7.45)
PHOSPHATE SERPL-MCNC: 2.2 MG/DL (ref 2.5–4.5)
PLATELET # BLD AUTO: 96 10E9/L (ref 150–450)
PO2 BLD: 74 MM HG (ref 80–105)
POTASSIUM SERPL-SCNC: 4.1 MMOL/L (ref 3.4–5.3)
RBC # BLD AUTO: 2.58 10E12/L (ref 4.4–5.9)
SODIUM SERPL-SCNC: 140 MMOL/L (ref 133–144)
WBC # BLD AUTO: 30.4 10E9/L (ref 4–11)

## 2020-06-15 PROCEDURE — 40000014 ZZH STATISTIC ARTERIAL MONITORING DAILY

## 2020-06-15 PROCEDURE — 40000986 XR ABDOMEN PORT 1 VW

## 2020-06-15 PROCEDURE — 25000132 ZZH RX MED GY IP 250 OP 250 PS 637: Performed by: STUDENT IN AN ORGANIZED HEALTH CARE EDUCATION/TRAINING PROGRAM

## 2020-06-15 PROCEDURE — 25800030 ZZH RX IP 258 OP 636: Performed by: STUDENT IN AN ORGANIZED HEALTH CARE EDUCATION/TRAINING PROGRAM

## 2020-06-15 PROCEDURE — 82803 BLOOD GASES ANY COMBINATION: CPT | Performed by: STUDENT IN AN ORGANIZED HEALTH CARE EDUCATION/TRAINING PROGRAM

## 2020-06-15 PROCEDURE — 20000004 ZZH R&B ICU UMMC

## 2020-06-15 PROCEDURE — 40000275 ZZH STATISTIC RCP TIME EA 10 MIN

## 2020-06-15 PROCEDURE — 25000125 ZZHC RX 250: Performed by: SURGERY

## 2020-06-15 PROCEDURE — 25000132 ZZH RX MED GY IP 250 OP 250 PS 637: Performed by: SURGERY

## 2020-06-15 PROCEDURE — 25000128 H RX IP 250 OP 636: Performed by: STUDENT IN AN ORGANIZED HEALTH CARE EDUCATION/TRAINING PROGRAM

## 2020-06-15 PROCEDURE — 83605 ASSAY OF LACTIC ACID: CPT | Performed by: STUDENT IN AN ORGANIZED HEALTH CARE EDUCATION/TRAINING PROGRAM

## 2020-06-15 PROCEDURE — 25000125 ZZHC RX 250

## 2020-06-15 PROCEDURE — 94003 VENT MGMT INPAT SUBQ DAY: CPT

## 2020-06-15 PROCEDURE — 84100 ASSAY OF PHOSPHORUS: CPT | Performed by: ANESTHESIOLOGY

## 2020-06-15 PROCEDURE — 95711 VEEG 2-12 HR UNMONITORED: CPT

## 2020-06-15 PROCEDURE — 25000125 ZZHC RX 250: Performed by: STUDENT IN AN ORGANIZED HEALTH CARE EDUCATION/TRAINING PROGRAM

## 2020-06-15 PROCEDURE — 3E043XZ INTRODUCTION OF VASOPRESSOR INTO CENTRAL VEIN, PERCUTANEOUS APPROACH: ICD-10-PCS | Performed by: SURGERY

## 2020-06-15 PROCEDURE — 85027 COMPLETE CBC AUTOMATED: CPT | Performed by: ANESTHESIOLOGY

## 2020-06-15 PROCEDURE — 86140 C-REACTIVE PROTEIN: CPT | Performed by: ANESTHESIOLOGY

## 2020-06-15 PROCEDURE — 00000146 ZZHCL STATISTIC GLUCOSE BY METER IP

## 2020-06-15 PROCEDURE — 99291 CRITICAL CARE FIRST HOUR: CPT | Mod: GC | Performed by: SURGERY

## 2020-06-15 PROCEDURE — 83735 ASSAY OF MAGNESIUM: CPT | Performed by: ANESTHESIOLOGY

## 2020-06-15 PROCEDURE — 80048 BASIC METABOLIC PNL TOTAL CA: CPT | Performed by: ANESTHESIOLOGY

## 2020-06-15 PROCEDURE — 87070 CULTURE OTHR SPECIMN AEROBIC: CPT | Performed by: STUDENT IN AN ORGANIZED HEALTH CARE EDUCATION/TRAINING PROGRAM

## 2020-06-15 PROCEDURE — 44500 INTRO GASTROINTESTINAL TUBE: CPT

## 2020-06-15 PROCEDURE — 95712 VEEG 2-12 HR INTMT MNTR: CPT

## 2020-06-15 PROCEDURE — 27210437 ZZH NUTRITION PRODUCT SEMIELEM INTERMED LITER

## 2020-06-15 RX ORDER — HYDROMORPHONE HCL/0.9% NACL/PF 0.2MG/0.2
.2-.4 SYRINGE (ML) INTRAVENOUS
Status: DISCONTINUED | OUTPATIENT
Start: 2020-06-15 | End: 2020-06-19

## 2020-06-15 RX ORDER — LIDOCAINE HYDROCHLORIDE 20 MG/ML
SOLUTION OROPHARYNGEAL
Status: COMPLETED
Start: 2020-06-15 | End: 2020-06-15

## 2020-06-15 RX ORDER — BACLOFEN 10 MG/1
5 TABLET ORAL EVERY 8 HOURS
Status: DISCONTINUED | OUTPATIENT
Start: 2020-06-15 | End: 2020-06-19

## 2020-06-15 RX ORDER — LIDOCAINE HYDROCHLORIDE 20 MG/ML
5 SOLUTION OROPHARYNGEAL ONCE
Status: COMPLETED | OUTPATIENT
Start: 2020-06-15 | End: 2020-06-15

## 2020-06-15 RX ORDER — GABAPENTIN 300 MG/1
300 CAPSULE ORAL EVERY 8 HOURS
Status: DISCONTINUED | OUTPATIENT
Start: 2020-06-15 | End: 2020-06-17

## 2020-06-15 RX ADMIN — MULTIVITAMIN 15 ML: LIQUID ORAL at 08:17

## 2020-06-15 RX ADMIN — Medication 0.5 MG: at 08:07

## 2020-06-15 RX ADMIN — Medication 40 MG: at 15:52

## 2020-06-15 RX ADMIN — POTASSIUM PHOSPHATE, MONOBASIC AND POTASSIUM PHOSPHATE, DIBASIC 15 MMOL: 224; 236 INJECTION, SOLUTION INTRAVENOUS at 05:52

## 2020-06-15 RX ADMIN — Medication 5000 UNITS: at 23:54

## 2020-06-15 RX ADMIN — VASOPRESSIN 2.5 UNITS/HR: 20 INJECTION INTRAVENOUS at 00:56

## 2020-06-15 RX ADMIN — SODIUM BICARBONATE 650 MG TABLET 650 MG: at 15:51

## 2020-06-15 RX ADMIN — THIAMINE HCL TAB 100 MG 100 MG: 100 TAB at 08:17

## 2020-06-15 RX ADMIN — INSULIN ASPART 1 UNITS: 100 INJECTION, SOLUTION INTRAVENOUS; SUBCUTANEOUS at 00:16

## 2020-06-15 RX ADMIN — RIFAXIMIN 550 MG: 550 TABLET ORAL at 20:10

## 2020-06-15 RX ADMIN — SODIUM CHLORIDE, POTASSIUM CHLORIDE, SODIUM LACTATE AND CALCIUM CHLORIDE: 600; 310; 30; 20 INJECTION, SOLUTION INTRAVENOUS at 02:28

## 2020-06-15 RX ADMIN — INSULIN ASPART 1 UNITS: 100 INJECTION, SOLUTION INTRAVENOUS; SUBCUTANEOUS at 11:50

## 2020-06-15 RX ADMIN — THIAMINE HCL TAB 100 MG 100 MG: 100 TAB at 14:05

## 2020-06-15 RX ADMIN — ACETAMINOPHEN 975 MG: 325 TABLET, FILM COATED ORAL at 15:51

## 2020-06-15 RX ADMIN — ACETAMINOPHEN 975 MG: 325 TABLET, FILM COATED ORAL at 08:17

## 2020-06-15 RX ADMIN — Medication 5 MG: at 23:55

## 2020-06-15 RX ADMIN — GABAPENTIN 600 MG: 300 CAPSULE ORAL at 08:16

## 2020-06-15 RX ADMIN — PANTOPRAZOLE SODIUM 40 MG: 40 TABLET, DELAYED RELEASE ORAL at 08:18

## 2020-06-15 RX ADMIN — Medication 5000 UNITS: at 01:39

## 2020-06-15 RX ADMIN — RIFAXIMIN 550 MG: 550 TABLET ORAL at 08:17

## 2020-06-15 RX ADMIN — GABAPENTIN 300 MG: 300 CAPSULE ORAL at 15:52

## 2020-06-15 RX ADMIN — Medication 5 MG: at 15:52

## 2020-06-15 RX ADMIN — FOLIC ACID 1 MG: 1 TABLET ORAL at 08:17

## 2020-06-15 RX ADMIN — GABAPENTIN 300 MG: 300 CAPSULE ORAL at 23:54

## 2020-06-15 RX ADMIN — LIDOCAINE HYDROCHLORIDE 5 ML: 20 SOLUTION OROPHARYNGEAL at 14:05

## 2020-06-15 RX ADMIN — Medication 0.4 MG: at 21:06

## 2020-06-15 RX ADMIN — Medication 0.4 MG: at 15:12

## 2020-06-15 RX ADMIN — CEFEPIME 2 G: 2 INJECTION, POWDER, FOR SOLUTION INTRAVENOUS at 15:53

## 2020-06-15 RX ADMIN — SODIUM BICARBONATE 650 MG TABLET 650 MG: at 11:50

## 2020-06-15 RX ADMIN — SODIUM BICARBONATE 650 MG TABLET 650 MG: at 20:10

## 2020-06-15 RX ADMIN — SODIUM BICARBONATE 650 MG TABLET 650 MG: at 08:17

## 2020-06-15 RX ADMIN — Medication 5000 UNITS: at 15:52

## 2020-06-15 RX ADMIN — SODIUM CHLORIDE, POTASSIUM CHLORIDE, SODIUM LACTATE AND CALCIUM CHLORIDE: 600; 310; 30; 20 INJECTION, SOLUTION INTRAVENOUS at 14:09

## 2020-06-15 RX ADMIN — BACLOFEN 10 MG: 10 TABLET ORAL at 08:17

## 2020-06-15 RX ADMIN — ACETAMINOPHEN 975 MG: 325 TABLET, FILM COATED ORAL at 23:54

## 2020-06-15 RX ADMIN — ACETAMINOPHEN 975 MG: 325 TABLET, FILM COATED ORAL at 01:39

## 2020-06-15 RX ADMIN — LIDOCAINE HYDROCHLORIDE 5 ML: 20 SOLUTION ORAL; TOPICAL at 14:05

## 2020-06-15 RX ADMIN — Medication 5000 UNITS: at 08:17

## 2020-06-15 RX ADMIN — Medication 0.04 MCG/KG/MIN: at 20:24

## 2020-06-15 RX ADMIN — THIAMINE HCL TAB 100 MG 100 MG: 100 TAB at 20:10

## 2020-06-15 RX ADMIN — INSULIN ASPART 1 UNITS: 100 INJECTION, SOLUTION INTRAVENOUS; SUBCUTANEOUS at 04:22

## 2020-06-15 RX ADMIN — CEFEPIME 2 G: 2 INJECTION, POWDER, FOR SOLUTION INTRAVENOUS at 02:28

## 2020-06-15 ASSESSMENT — ACTIVITIES OF DAILY LIVING (ADL)
ADLS_ACUITY_SCORE: 26
ADLS_ACUITY_SCORE: 22
ADLS_ACUITY_SCORE: 22
ADLS_ACUITY_SCORE: 26
ADLS_ACUITY_SCORE: 26
ADLS_ACUITY_SCORE: 22

## 2020-06-15 NOTE — PROGRESS NOTES
"Brief trauma note  Brief HPI  59 year old male who underwent left hip replacement 2 weeks ago, found down in garage by home nurse, transferred to [facility] from [facility] after he became febrile, had multiple seizures, became less response, CIWA 26, subsequently intubated. Found to have multiple posterior left rib fractures, concerns for urosepsis, concerns for possible EtOH withdrawal. Urology was consulted for possible obstructing left Ureteral stone and he underwent a left retrograde pyelogram with a left ureteral stent. He remains critically ill and is being treated for urosepsis. He remains on norepinephrine for BP support and remains intubated for airway protected and AMS.    Assessment  Vital signs:  Temp: 98.1  F (36.7  C) Temp src: Axillary BP: (P) 105/46 Pulse: 98 Heart Rate: 79 Resp: 23 SpO2: 98 % O2 Device: Mechanical Ventilator     Weight: 91.2 kg (201 lb 1 oz)(MD notified regarding wt gain)  Estimated body mass index is 32.45 kg/m  as calculated from the following:    Height as of an earlier encounter on 6/12/20: 1.676 m (5' 6\").    Weight as of this encounter: 91.2 kg (201 lb 1 oz).  Lab Results   Component Value Date    WBC 30.4 (H) 06/15/2020    WBC 37.4 (H) 06/14/2020    WBC 49.1 (H) 06/14/2020    HGB 8.2 (L) 06/15/2020    HGB 8.6 (L) 06/14/2020    HGB 9.6 (L) 06/14/2020    HCT 27.3 (L) 06/15/2020    HCT 28.9 (L) 06/14/2020    HCT 31.8 (L) 06/14/2020    PLT 96 (L) 06/15/2020     (L) 06/14/2020     06/14/2020     06/15/2020     06/14/2020     06/14/2020    POTASSIUM 4.1 06/15/2020    POTASSIUM 4.0 06/14/2020    POTASSIUM 4.3 06/14/2020    CHLORIDE 115 (H) 06/15/2020    CHLORIDE 117 (H) 06/14/2020    CHLORIDE 114 (H) 06/14/2020    CO2 17 (L) 06/15/2020    CO2 16 (L) 06/14/2020    CO2 15 (L) 06/14/2020    BUN 27 06/15/2020    BUN 23 06/14/2020    BUN 20 06/14/2020    CR 1.69 (H) 06/15/2020    CR 1.65 (H) 06/14/2020    CR 1.88 (H) 06/14/2020     (H) 06/15/2020    GLC " 146 (H) 06/14/2020     (H) 06/14/2020    SED 73 (H) 03/15/2019    SED 55 (H) 10/10/2018    DD 0.9 (H) 07/25/2008    NTBNPI 1,103 (H) 03/18/2018    NTBNP 480 (H) 12/20/2018    NTBNP 294 (H) 11/13/2017    TROPI <0.015 06/12/2020    TROPI <0.015 12/20/2018    TROPI <0.012 01/02/2009    AST 30 06/13/2020    AST Canceled, Test credited 06/12/2020    AST Canceled, Test credited 06/12/2020    ALT 14 06/13/2020    ALT 16 06/12/2020    ALT 16 06/12/2020    ALKPHOS 353 (H) 06/13/2020    ALKPHOS 340 (H) 06/12/2020    ALKPHOS 464 (H) 06/12/2020    BILITOTAL 1.7 (H) 06/13/2020    BILITOTAL 2.0 (H) 06/12/2020    BILITOTAL 1.9 (H) 06/12/2020    STEW Canceled, Test credited 06/12/2020    STEW Canceled, Test credited 06/12/2020    STEW 71 (H) 06/12/2020    INR 1.64 (H) 06/12/2020    INR 1.47 (H) 06/12/2020    INR 1.38 (H) 05/29/2020     Plan:  None specific from the trauma team  SICU cares appreciated    Gunnar Roblero [facility]  Trauma services

## 2020-06-15 NOTE — PROGRESS NOTES
[facility]   Patient is currently open to home care services with [facility]. The patient is currently receiving RN PT OT services. [facility]  and team have been notified of patient admission. [facility] liaison will continue to follow patient during stay. If appropriate provide orders to resume home care at time of discharge.    Gabrielle Keene RN  [facility] Liaison  [phone]

## 2020-06-15 NOTE — PROGRESS NOTES
"Neuroscience Intensive Care Progress Note  06/15/2020    REASON FOR CRITICAL CARE ADMISSION: Sepsis with acute renal failure and septic shock, due to unspecified organism, unspecified acute renal failure      ADMITTING PHYSICIAN: Kodak Walls MD     Date of Service (when I saw the patient): 6/15/20    ASSESSMENT: Abhijeet Mccauley is a 59 year old male admitted on 2020 with a PMH for ETOH abuse w/hx of withdrawal and seizures, cirrhosis of the liver w/esophageal varices and hepatic encephalopathy, CKD, Hx of demyelinating polyneuropathy s/p IVIG, thiamine deficiency, AML s/p chemo, COPD, Hx of PE, HTN, PUD, and ulcerative colitis who presents from an OSH ([facility] for likely seizures in setting of urosepsis or alcohol withdrawal.    Of note, patient has been having recurring falls in the setting of imbalance and was found to have multiple R sided rib fractures. While in ED, he became increasingly confused and tachycardic with \"shakes.\" Ativan was given d/t high concern for alcohol withdrawal. He continued to seize requiring additional doses of Ativan and ultimately became less responsive and intubated. NeuroCritical care team was consulted for seizure management.         24 Hour Vital Signs Summary:  Temperatures:  Current - Temp: 98  F (36.7  C); Max - Temp  Av.6  F (36.4  C)  Min: 96.8  F (36  C)  Max: 98  F (36.7  C)  Respiration range: Resp  Av.1  Min: 18  Max: 26  Pulse range: Pulse  Av  Min: 98  Max: 98  Blood pressure range: Systolic (24hrs), Av , Min:127 , Max:194   ; Diastolic (24hrs), Av, Min:60, Max:82    Pulse oximetry range: SpO2  Av.4 %  Min: 79 %  Max: 100 %    Ventilator Settings  Ventilation Mode: CMV/AC  (Continuous Mandatory Ventilation/ Assist Control)  FiO2 (%): 40 %  Rate Set (breaths/minute): 18 breaths/min  Tidal Volume Set (mL): 430 mL  PEEP (cm H2O): 7 cmH2O  Pressure Support (cm H2O): 7 cmH2O  Oxygen Concentration (%): 40 %  Resp: " 26        Intake/Output Summary (Last 24 hours) at 6/15/2020 1152  Last data filed at 6/15/2020 1000  Gross per 24 hour   Intake 4775.77 ml   Output 927 ml   Net 3848.77 ml       Arterial Line BP: ()/(6-77) 111/52  MAP:  [60 mmHg-136 mmHg] 75 mmHg  BP - Mean:  [] 117    Current Medications:    acetaminophen  975 mg Oral or Feeding Tube Q8H     baclofen  5 mg Oral or Feeding Tube Q8H     ceFEPIme (MAXIPIME) IV  2 g Intravenous Q12H     folic acid  1 mg Oral Daily     gabapentin  300 mg Oral or Feeding Tube Q8H     heparin ANTICOAGULANT  5,000 Units Subcutaneous Q8H     insulin aspart  1-4 Units Subcutaneous Q4H     multivitamins w/minerals  15 mL Per Feeding Tube Daily     pantoprazole  40 mg Oral or Feeding Tube BID AC     rifaximin  550 mg Oral or Feeding Tube BID     sodium bicarbonate  650 mg Oral or Feeding Tube 4x Daily     thiamine  100 mg Oral or Feeding Tube TID     [START ON 6/20/2020] thiamine  100 mg Oral or Feeding Tube Daily       PRN Medications:  albuterol, bisacodyl, dextrose, glucose **OR** dextrose **OR** glucagon, flumazenil, haloperidol lactate, HYDROmorphone, LORazepam **OR** LORazepam, magnesium sulfate, magnesium sulfate, naloxone, ondansetron **OR** ondansetron, polyethylene glycol, potassium chloride, potassium chloride with lidocaine, potassium chloride, potassium chloride, potassium chloride, potassium phosphate (KPHOS) in D5W IV, potassium phosphate (KPHOS) in D5W IV, potassium phosphate (KPHOS) in D5W IV, potassium phosphate (KPHOS) in D5W IV, senna-docusate **OR** senna-docusate    Infusions:    dextrose       lactated ringers 100 mL/hr at 06/15/20 1000     norepinephrine 0.03 mcg/kg/min (06/15/20 1145)     vasopressin (PITRESSIN) infusion ADULT (40 mL) Stopped (06/15/20 1145)       Allergies   Allergen Reactions     Blood Transfusion Related (Informational Only)      Patient has a history of a clinically significant antibody against RBC antigens.  A delay in compatible RBCs  "may occur.     Famotidine Unknown and Other (See Comments)     Severe abdominal cramps     Cyclobenzaprine Hives     Methocarbamol Other (See Comments)     Made pt's \"face break out\"     Pepcid Cramps     Severe abdominal cramps     Vancomycin Other (See Comments)     hallucinations     Vfend Other (See Comments)     IV - cold sweats, Iron tablet makes him nauseated and stomach ached     Hydrocodone-Acetaminophen Rash       Physical Examination:  Vitals: BP (!) 194/82   Pulse 98   Temp 98  F (36.7  C) (Axillary)   Resp 26   Wt 91.2 kg (201 lb 1 oz)   SpO2 96%   BMI 32.45 kg/m    EXAM: Examined off sedation  - Opens eye to verbal or tactile stimuli, following commands   - Conjugate gaze  - Pupils symmetric, +3mm, and reactive bilaterally  - Corneal response intact  - No clear facial asymmetry  - Cough and gag present with deep suctioning  - Purposeful limb movements       Labs/Studies:  Recent Labs   Lab Test 06/15/20  0335 20  1841 20  1327 20  0359    141  --  140   POTASSIUM 4.1 4.0  --  4.3   CHLORIDE 115* 117*  --  114*   CO2 17* 16*  --  15*   ANIONGAP 7 8  --  11   * 146*  --  120*   BUN 27 23  --  20   CR 1.69* 1.65*  --  1.88*   BEE 7.7* 6.9*  --  7.6*   WBC 30.4*  --  37.4* 49.1*   RBC 2.58*  --  2.72* 2.98*   HGB 8.2*  --  8.6* 9.6*   PLT 96*  --  114* 155       Recent Labs   Lab Test 20  2110 20  1005 20  1118 20  0951   INR 1.64* 1.47* 1.38* 1.42*   PTT 33 37  --  35       Recent Labs   Lab 06/15/20  0332 20  1553 20  0917 20  0359   PH 7.37 7.33* 7.33* 7.31*   PCO2 28* 25* 22* 27*   PO2 74* 96 85 92   HCO3 16* 13* 11* 14*   O2PER 40 40.0 40 60.0         Imagin. CT Head w/o contrast on 20 at 1053  Impression:   1. Chronic changes. No evidence for intracranial hemorrhage  or new acute process.       Assessment/Plan  Abhijeet Mccauley is a 59 year old admitted on 2020 for likely seizures in setting of urosepsis or " alcohol withdrawal.     Low suspicion for seizure activity at this time, vEEG without subclinical seizures and patient is now following commands. Read more consistent with moderate encephalopathy. Given his CT head is without evidence of stroke or hemorrhage and he is not currently seizing, his encephalopathy, is more consistent with metabolic factors such as his urosepsis. Suspect he will continue to improve as infection clears.          Plan  Neuro:  #Seizures  #Encephalopathy   - Ok to stop vEEG (Will do for you)  - Continue to treat underlying infection, correct metabolic derangements as able.  - Continue to avoid CNS acting drugs  - NCC will sign off, please call for any questions #[phone]        The patient was seen and discussed with the attending, Dr. Byrd.    Chasity MARROQUIN, CNP  NeuroCritical Care Nurse Practitioner  Pager: [phone]  Ascom: *[phone] available M-F [phone] to [phone]

## 2020-06-15 NOTE — PLAN OF CARE
PT 4A. Cancel. Upon initiation of ROM to LUE, pt become agitated, pulling away from PT and biting ET tube.  Will reschedule tomorrow.

## 2020-06-15 NOTE — PROCEDURES
Small Bowel Feeding Tube Placement Assessment  Reason for Feeding Tube Placement: request from team for ppFT  Cortrak Start Time: 1:00  Cortrak End Time: 1:20  Medicine Delivered During Procedure: lidocaine  Placement Successful: Presume post-pyloric (pending AXR confirmation).  Procedure Complications: none  Final Placement Napoleon at exit of nare: 110 cm  Face to Face time with patient: 30 min        Bridle Placement:   Reason for bridle placement: securement of ppFT  Medicine delivered during procedure: lubricating jelly   Procedure: Successful  Location of top of clip on FT: @ 111 cm marker   Condition of nose/skin at time of bridle placement: Unremarkable   Face to Face time with patient: <5 minutes.        Andreia Monsalve, DEANNA, MS, LD  SICU: [phone]

## 2020-06-15 NOTE — PROGRESS NOTES
SURGICAL ICU PROGRESS NOTE  Yeimi 15, 2020      PRIMARY TEAM: Trauma  PRIMARY PHYSICIAN: Dr. Walls     REASON FOR CRITICAL CARE ADMISSION: Intubated and hemodynamic monitoring  ADMITTING PHYSICIAN: Dr. Dodson        ASSESSMENT: Abhijeet Mccauley is a 59 year old male with hx of alcohol abuse, liver cirrhosis MELD score 16, COPD, ulcerative colitis and renal insufficiency, 2 weeks s/p left hip replacement, presents after multiple falls, sustained R rib 3-8 fx, UA was positive, intubated in [facility] for seizures.     Today's Changes:  - Change dilaudid dose  - Decrease bacolfen and gabapentin  - Follow up EEG   - NJT placement  - Order A1C, lactate  - C diff if continues to have loose stools     PLAN:   Neuro/ pain/ sedation:  # Possible seizure disorder  Monitor neurological status. Notify the MD for any acute changes in exam.   CIWA protocol              - PRN ativan 1-2mg q4h prn, none given overnight     Sedation: Precedex gtt weaned off -- resume if as needed for sedation  - Haloperidol 5 mg Q4H PRN for agitation     Pain:   Dilaudid 0.2 - 0.4 mg Q1H PRN   baclofen 5 mg TID   acetaminophen 975 MG Q8H  gabapentin 300 mg Q8H         RASS goal set at 0 to -1 (-3 documented)     Neuro critical care:  - EEG for 24 hours, finishing this morning  - Recommend management of provoking factors for seizures overnight   - No anti-seizure medication right now   - Notify me if there is concern for additional seizures and I will reassess      Home meds  Hold for now: Tramadol, Trazadone, hydroxyzine, Bupropion    Pulmonary care:   VC              18/ 430/ 7/ 40%    ABG @ 0400: pH 7.37 / pO2 74 / pCO2 28 / HCO3 16     Albuterol 2.5 mg PRN     Type 1 renal tubular acidosis, receiving sodium bicarb tabs 650 mg QID   Try to correct bicarb to normal range     # Rib fractures  R rib 3-8 fx, multiple have anterior and posterior components    Look to restart home meds once he wakes up  Home meds  Albuterol, tiotropium; Montelukast,  loratadine; Dulera      Cardiovascular:    # HLD  Monitor hemodynamic status.     NE gtt 0.03 mcg/kg/min  Vaso at 1.5    MAPs 67 - 119  HR 71 - 109, sinus     Home meds  On home atorvastatin 20 mg daily  ASA 81 mg daily     GI care:   # Alcohol abuse, liver cirrhosis MELD score 16  NPO except meds, OG tube to LIS     feeding tube placement today  - Furosemide 20 mg daily -- discontinued on 6/14  - Dulcolax supp 10 mg daily PRN, PEG daily PRN  - Thiamine and folic acid  - Last BM 6/15   - TFs at 20  - Rifaximin 550 mg BID      Home meds  Hold on resrtating all anti hypertensives  Propranolol 20 mg BID  Spironolactone 50 mg daily      Fluids/ Electrolytes/ Nutrition:   Continue to monitor I/Os              I/O: 3.7/ 1.1 (+2.6)   mL/hr     No indication for parenteral nutrition.     Cr 1.69 (1.65)     Electrolyte replacement protocol     - lactate ordered      Endocrine:    No history of diabetes  Sliding scale for diabetes management.                - 181   - Aspart 3 units     Hgb A1C ordered      ID/ Antibiotics:  #UTI, h/o enterobacter infections  - Cefepime (14 day course of total antibiotics, end 6/26)  - Left superior pole calectasis s/p cystoscopy with retrograde pyelogram and ureteral stent insertion 6/13  - Urology following:   - No new recs     WBC 30.4 (37.4)  Afebrile    Bcx 6/12: Enterobacter cloacae  BCx 6/14 NGTD  Ucx NGTD         send a c diff if multiple bowel movements   sputum cultures     Heme:     Hgb 8.2 (8.6)         Prophylaxis:    Mechanical prophylaxis for DVT  Protonix 40 mg BID, switched to BID  SubQ heparin TID          Miscellaneous:    PT, OT, social work for dispo       Lines/ tubes/ drains:  ETT, ng tube, PIV x2, urinary catheter      Disposition:  Surgical ICU.     Patient seen and discussed with staff.    Chente Tijerina MD    ====================================    TODAY'S SUBJECTIVE/INTERVAL HISTORY:   - Sodium bicarb tabs  - PEEP decreased, unable to  extubate    OBJECTIVE:     Temp:  [96.8  F (36  C)-98  F (36.7  C)] 98  F (36.7  C)  Pulse:  [98] 98  Heart Rate:  [] 118  Resp:  [18-26] 26  BP: (127-194)/(60-82) 194/82  MAP:  [60 mmHg-136 mmHg] 124 mmHg  Arterial Line BP: ()/(6-77) 192/74  FiO2 (%):  [40 %] 40 %  SpO2:  [79 %-100 %] 94 %  Ventilation Mode: CMV/AC  (Continuous Mandatory Ventilation/ Assist Control)  FiO2 (%): 40 %  Rate Set (breaths/minute): 18 breaths/min  Tidal Volume Set (mL): 430 mL  PEEP (cm H2O): 7 cmH2O  Pressure Support (cm H2O): 7 cmH2O  Oxygen Concentration (%): 40 %  Resp: 26      I/O last 3 completed shifts:  In: 4113.12 [I.V.:3573.12; NG/GT:240]  Out: 955 [Urine:955]    General/Neuro: Intubated and sedated, opens eyes to command and moves extremities  Pulm: Mechanically ventilated  Abd: soft; NT, ND  Extremities: +1 edema  Skin: warm and well-perfused.     LABS:   Arterial Blood Gases   Recent Labs   Lab 06/15/20  0332 06/14/20  1553 06/14/20  0917 06/14/20  0359   PH 7.37 7.33* 7.33* 7.31*   PCO2 28* 25* 22* 27*   PO2 74* 96 85 92   HCO3 16* 13* 11* 14*     Complete Blood Count   Recent Labs   Lab 06/15/20  0335 06/14/20  1327 06/14/20  0359 06/13/20  2244 06/13/20  2213   WBC 30.4* 37.4* 49.1*  --  25.5*   HGB 8.2* 8.6* 9.6* 9.8* 8.6*   PLT 96* 114* 155  --  109*     Basic Metabolic Panel  Recent Labs   Lab 06/15/20  0335 06/14/20  1841 06/14/20  0359 06/13/20  2244 06/13/20  2213    141 140 139 141   POTASSIUM 4.1 4.0 4.3 3.2* 3.3*   CHLORIDE 115* 117* 114*  --  115*   CO2 17* 16* 15*  --  16*   BUN 27 23 20  --  18   CR 1.69* 1.65* 1.88*  --  1.93*   * 146* 120* 84 79     Liver Function Tests  Recent Labs   Lab 06/13/20  0443 06/12/20  2209 06/12/20  2110 06/12/20  1005   AST 30 Canceled, Test credited Canceled, Test credited 38   ALT 14 16 16 19   ALKPHOS 353*  --  340* 464*   BILITOTAL 1.7*  --  2.0* 1.9*   ALBUMIN 2.3*  --  2.3* 2.9*   INR  --   --  1.64* 1.47*     Pancreatic Enzymes  Recent Labs   Lab  06/12/20  1005   LIPASE 192     Coagulation Profile  Recent Labs   Lab 06/12/20  2110 06/12/20  1005   INR 1.64* 1.47*   PTT 33 37         IMAGING:   Recent Results (from the past 24 hour(s))   XR Surgery HENRIETTA L/T 5 Min Fluoro w Stills    Narrative    XR SURGERY HENRIETTA FLUORO LESS THAN 5 MIN W STILLS 6/13/2020 11:46 PM     COMPARISON: 6/13/2020    HISTORY: Cystoscopy    NUMBER OF IMAGES ACQUIRED: 6    VIEWS: AP    FLUOROSCOPY TIME: 1.75 minute(s)      Impression    IMPRESSION: Fluoroscopy provided to the urology service.    ANGEL DAVID MD   XR Chest Port 1 View    Narrative    EXAM: XR CHEST PORT 1 VW 6/14/2020 12:10 AM      HISTORY: line placement, hypoxemia..    COMPARISON: 6/12/2020.     TECHNIQUE: Frontal supine view of the chest.    FINDINGS: Right internal jugular central venous catheter tip projects  near the right brachiocephalic vein. Endotracheal tube tip projects  over the low thoracic trachea. Enteric tube proximal sidehole projects  at the gastric cardia with tip collimated beyond view inferiorly.    Cardiomediastinal silhouette is stable with prominent left pericardial  fat pad. Low lung volumes with increased vascular crowding, perihilar  attenuation, and streaky basilar/retrocardiac opacities. Ill-defined  linear opacity over the central right midlung overlying posterior  right fifth rib fracture. Multiple right-sided rib fractures again  noted.      Impression    IMPRESSION:   Right internal jugular central venous catheter tip projects over the  right brachiocephalic vein.  Increased parahilar and basilar opacities, likely edema. Consider  atelectasis.        I have personally reviewed the examination and initial interpretation  and I agree with the findings.    ANGEL DAVID MD

## 2020-06-15 NOTE — PLAN OF CARE
Major Shift Events: Lethargic and sleeping for majority of shift.  Patient does awaken and become agitated with cares, but is otherwise sleeping calmly.  Intermittently following commands.  Dilaudid x 2 given for presumed pain with repositioning - HR up to 130s, SBP in 200s, tachypneic, and restless biting on tube.  Patient not redirectable with agitation.  Unable to fully assess and score CIWA as patient is intubated/nonverbal and intermittently interactive.  Vasopressin off and plan to titrate Levophed overnight to keep MAP >65.  NJ placed by dietician and TFs now infusing through NJ.  OG to LIS this evening.  TF @ 30 with goal increased to 50.  BM x 2 this am.  Chong with susan urine with sediment.  LR @ 100.  No out of bed activity due to agitation and labile pressures.      Plan: Continue to wean norepinephrine as able to keep MAP >65.  PS in am with possible extubation.      For vital signs and complete assessments, please see documentation flowsheets.

## 2020-06-15 NOTE — PLAN OF CARE
Major Shift Events: Open eyes with position changes/ BUE 5+ strength & BLE 4+. Pupil 2- 4 mm's, equal & reactive. Inconsistent following commands. EEG active. [place]. SR, MAP>65 w/ titrated vaso & norepinephrine. ETT at FiO2 40%, RR 18, Vol 430 & PEEP 7. Lungs clear/coarse w/ diminished bases, +cough with suction, yellow thick/thin secretion. Active BS, BM x1, ga w/ 30 ml uop/hr yellow cloudy. TF at 20 ml/hr w/ 60 ml q4h flush. Phos 2.2- replacement infusing. WBC 30.4. updated SICU w/ recent gas readings- order to continue current vent setting & bicarb tabs.   Plan: update MD w/t acute changes. PS trial/ up in chair.   For vital signs and complete assessments, please see documentation flowsheets.

## 2020-06-15 NOTE — PROGRESS NOTES
Urology  Progress Note    - Remains intubated, sedated requiring vaso and levophed  - White count improving  - CT A/P yesterday revealed good positioning of L ureteral stent with decompression of upper pole  - Undergoing neurological evaluation/EEG, initially thought to be encephalopathy  - White count trending down this AM    Exam  /60 (BP Location: Left arm)   Pulse 98   Temp 98  F (36.7  C) (Axillary)   Resp 21   Wt 91.2 kg (201 lb 1 oz)   SpO2 97%   BMI 32.45 kg/m    Intubated, sedated  Abdomen soft, non-distended  Lower extremities non-edematous bilaterally  Chong susan in appearance    /267  /NR  Stool 1x/2x    Labs  WBC   Date Value Ref Range Status   06/15/2020 30.4 (H) 4.0 - 11.0 10e9/L Final   06/14/2020 37.4 (H) 4.0 - 11.0 10e9/L Final   06/14/2020 49.1 (H) 4.0 - 11.0 10e9/L Final      Hemoglobin   Date Value Ref Range Status   06/15/2020 8.2 (L) 13.3 - 17.7 g/dL Final   06/14/2020 8.6 (L) 13.3 - 17.7 g/dL Final   06/14/2020 9.6 (L) 13.3 - 17.7 g/dL Final      Platelet Count   Date Value Ref Range Status   06/15/2020 96 (L) 150 - 450 10e9/L Final      Creatinine   Date Value Ref Range Status   06/15/2020 1.69 (H) 0.66 - 1.25 mg/dL Final   06/14/2020 1.65 (H) 0.66 - 1.25 mg/dL Final   06/14/2020 1.88 (H) 0.66 - 1.25 mg/dL Final      Potassium   Date Value Ref Range Status   06/15/2020 4.1 3.4 - 5.3 mmol/L Final        Cultures:  UC 6/12: >100,000 GNRs  BC 6/12: E. Cloacae  UC left renal pelvis 6/13: pending  BC x2 6/14: NGTD    Assessment/Plan  59 year old y/o male POD#2 s/p left ureteral stent placement for obstructing stone, urosepsis.     - Continue catheter until afebrile/off pressors/off vent for 24 hrs  - Continue abx per primary, tailoring to cultures including intraop renal pelvis urine culture  - Urology will continue to follow, please call with questions  - Definitive stone treatment to be arranged upon resolution of acute illness    Will discuss with staff   Zoie Nicolas MD  Urology PGY4     Contacting the Urology Team     Please use the following job codes to reach the Urology Team. Note that you must use an in house phone and that job codes cannot receive text pages.     On weekdays, dial [phone] (or star-star-star [phone] on the new [employer] telephones) then [phone] to reach the Adult Urology resident or PA on call    On weekdays, dial [phone] (or star-star-star [phone] on the new [employer] telephones) then [phone] to reach the Pediatric Urology resident    On weeknights and weekends, dial [phone] (or star-star-star [phone] on the new [employer] telephones) then [phone] to reach the Urology resident on call (for both Adult and Pediatrics)

## 2020-06-16 ENCOUNTER — APPOINTMENT (OUTPATIENT)
Dept: PHYSICAL THERAPY | Facility: CLINIC | Age: 60
End: 2020-06-16
Attending: STUDENT IN AN ORGANIZED HEALTH CARE EDUCATION/TRAINING PROGRAM
Payer: COMMERCIAL

## 2020-06-16 LAB
AMMONIA PLAS-SCNC: 15 UMOL/L (ref 10–50)
ANION GAP SERPL CALCULATED.3IONS-SCNC: 6 MMOL/L (ref 3–14)
BACTERIA SPEC CULT: ABNORMAL
BASE DEFICIT BLDA-SCNC: 4.6 MMOL/L
BASE DEFICIT BLDA-SCNC: 4.7 MMOL/L
BUN SERPL-MCNC: 29 MG/DL (ref 7–30)
C DIFF TOX B STL QL: NEGATIVE
CALCIUM SERPL-MCNC: 8 MG/DL (ref 8.5–10.1)
CHLORIDE SERPL-SCNC: 117 MMOL/L (ref 94–109)
CK SERPL-CCNC: 29 U/L (ref 30–300)
CO2 SERPL-SCNC: 20 MMOL/L (ref 20–32)
CREAT SERPL-MCNC: 1.5 MG/DL (ref 0.66–1.25)
ERYTHROCYTE [DISTWIDTH] IN BLOOD BY AUTOMATED COUNT: ABNORMAL % (ref 10–15)
GFR SERPL CREATININE-BSD FRML MDRD: 49 ML/MIN/{1.73_M2}
GLUCOSE BLDC GLUCOMTR-MCNC: 115 MG/DL (ref 70–99)
GLUCOSE BLDC GLUCOMTR-MCNC: 123 MG/DL (ref 70–99)
GLUCOSE BLDC GLUCOMTR-MCNC: 124 MG/DL (ref 70–99)
GLUCOSE BLDC GLUCOMTR-MCNC: 138 MG/DL (ref 70–99)
GLUCOSE BLDC GLUCOMTR-MCNC: 154 MG/DL (ref 70–99)
GLUCOSE BLDC GLUCOMTR-MCNC: 156 MG/DL (ref 70–99)
GLUCOSE SERPL-MCNC: 122 MG/DL (ref 70–99)
HBA1C MFR BLD: NORMAL % (ref 0–5.6)
HCO3 BLD-SCNC: 20 MMOL/L (ref 21–28)
HCO3 BLD-SCNC: 20 MMOL/L (ref 21–28)
HCT VFR BLD AUTO: 26.3 % (ref 40–53)
HCV RNA SERPL NAA+PROBE-ACNC: NORMAL [IU]/ML
HCV RNA SERPL NAA+PROBE-LOG IU: NORMAL LOG IU/ML
HGB BLD-MCNC: 7.8 G/DL (ref 13.3–17.7)
LACTATE BLD-SCNC: 1.5 MMOL/L (ref 0.7–2)
Lab: ABNORMAL
MAGNESIUM SERPL-MCNC: 2.1 MG/DL (ref 1.6–2.3)
MAGNESIUM SERPL-MCNC: 2.3 MG/DL (ref 1.6–2.3)
MCH RBC QN AUTO: 31.5 PG (ref 26.5–33)
MCHC RBC AUTO-ENTMCNC: 29.7 G/DL (ref 31.5–36.5)
MCV RBC AUTO: 106 FL (ref 78–100)
O2/TOTAL GAS SETTING VFR VENT: 40 %
O2/TOTAL GAS SETTING VFR VENT: ABNORMAL %
PCO2 BLD: 34 MM HG (ref 35–45)
PCO2 BLD: 34 MM HG (ref 35–45)
PH BLD: 7.38 PH (ref 7.35–7.45)
PH BLD: 7.39 PH (ref 7.35–7.45)
PHOSPHATE SERPL-MCNC: 1.5 MG/DL (ref 2.5–4.5)
PHOSPHATE SERPL-MCNC: 2.4 MG/DL (ref 2.5–4.5)
PLATELET # BLD AUTO: 73 10E9/L (ref 150–450)
PO2 BLD: 103 MM HG (ref 80–105)
PO2 BLD: 110 MM HG (ref 80–105)
POTASSIUM SERPL-SCNC: 3.7 MMOL/L (ref 3.4–5.3)
POTASSIUM SERPL-SCNC: 4.2 MMOL/L (ref 3.4–5.3)
RBC # BLD AUTO: 2.48 10E12/L (ref 4.4–5.9)
SODIUM SERPL-SCNC: 142 MMOL/L (ref 133–144)
SPECIMEN SOURCE: ABNORMAL
SPECIMEN SOURCE: ABNORMAL
SPECIMEN SOURCE: NORMAL
WBC # BLD AUTO: 19.5 10E9/L (ref 4–11)

## 2020-06-16 PROCEDURE — 25000132 ZZH RX MED GY IP 250 OP 250 PS 637: Performed by: STUDENT IN AN ORGANIZED HEALTH CARE EDUCATION/TRAINING PROGRAM

## 2020-06-16 PROCEDURE — 20000004 ZZH R&B ICU UMMC

## 2020-06-16 PROCEDURE — 40000014 ZZH STATISTIC ARTERIAL MONITORING DAILY

## 2020-06-16 PROCEDURE — 83605 ASSAY OF LACTIC ACID: CPT | Performed by: STUDENT IN AN ORGANIZED HEALTH CARE EDUCATION/TRAINING PROGRAM

## 2020-06-16 PROCEDURE — 84100 ASSAY OF PHOSPHORUS: CPT | Performed by: ANESTHESIOLOGY

## 2020-06-16 PROCEDURE — 25000128 H RX IP 250 OP 636: Performed by: STUDENT IN AN ORGANIZED HEALTH CARE EDUCATION/TRAINING PROGRAM

## 2020-06-16 PROCEDURE — 00000146 ZZHCL STATISTIC GLUCOSE BY METER IP

## 2020-06-16 PROCEDURE — 82140 ASSAY OF AMMONIA: CPT | Performed by: STUDENT IN AN ORGANIZED HEALTH CARE EDUCATION/TRAINING PROGRAM

## 2020-06-16 PROCEDURE — 97162 PT EVAL MOD COMPLEX 30 MIN: CPT | Mod: GP

## 2020-06-16 PROCEDURE — 25000125 ZZHC RX 250: Performed by: STUDENT IN AN ORGANIZED HEALTH CARE EDUCATION/TRAINING PROGRAM

## 2020-06-16 PROCEDURE — 82803 BLOOD GASES ANY COMBINATION: CPT | Performed by: STUDENT IN AN ORGANIZED HEALTH CARE EDUCATION/TRAINING PROGRAM

## 2020-06-16 PROCEDURE — 99291 CRITICAL CARE FIRST HOUR: CPT | Performed by: SURGERY

## 2020-06-16 PROCEDURE — 87493 C DIFF AMPLIFIED PROBE: CPT | Performed by: STUDENT IN AN ORGANIZED HEALTH CARE EDUCATION/TRAINING PROGRAM

## 2020-06-16 PROCEDURE — 25800030 ZZH RX IP 258 OP 636: Performed by: STUDENT IN AN ORGANIZED HEALTH CARE EDUCATION/TRAINING PROGRAM

## 2020-06-16 PROCEDURE — 83735 ASSAY OF MAGNESIUM: CPT | Performed by: STUDENT IN AN ORGANIZED HEALTH CARE EDUCATION/TRAINING PROGRAM

## 2020-06-16 PROCEDURE — 83735 ASSAY OF MAGNESIUM: CPT | Performed by: ANESTHESIOLOGY

## 2020-06-16 PROCEDURE — 84132 ASSAY OF SERUM POTASSIUM: CPT | Performed by: STUDENT IN AN ORGANIZED HEALTH CARE EDUCATION/TRAINING PROGRAM

## 2020-06-16 PROCEDURE — 84100 ASSAY OF PHOSPHORUS: CPT | Performed by: STUDENT IN AN ORGANIZED HEALTH CARE EDUCATION/TRAINING PROGRAM

## 2020-06-16 PROCEDURE — 94003 VENT MGMT INPAT SUBQ DAY: CPT

## 2020-06-16 PROCEDURE — 82803 BLOOD GASES ANY COMBINATION: CPT | Performed by: FAMILY MEDICINE

## 2020-06-16 PROCEDURE — 97530 THERAPEUTIC ACTIVITIES: CPT | Mod: GP

## 2020-06-16 PROCEDURE — 85027 COMPLETE CBC AUTOMATED: CPT | Performed by: ANESTHESIOLOGY

## 2020-06-16 PROCEDURE — 82550 ASSAY OF CK (CPK): CPT | Performed by: ANESTHESIOLOGY

## 2020-06-16 PROCEDURE — 40000275 ZZH STATISTIC RCP TIME EA 10 MIN

## 2020-06-16 PROCEDURE — 80048 BASIC METABOLIC PNL TOTAL CA: CPT | Performed by: ANESTHESIOLOGY

## 2020-06-16 PROCEDURE — 94640 AIRWAY INHALATION TREATMENT: CPT | Mod: 76

## 2020-06-16 PROCEDURE — 27210437 ZZH NUTRITION PRODUCT SEMIELEM INTERMED LITER

## 2020-06-16 PROCEDURE — 94640 AIRWAY INHALATION TREATMENT: CPT

## 2020-06-16 PROCEDURE — 25000132 ZZH RX MED GY IP 250 OP 250 PS 637: Performed by: SURGERY

## 2020-06-16 RX ORDER — TIOTROPIUM BROMIDE 18 UG/1
1 CAPSULE ORAL; RESPIRATORY (INHALATION) DAILY PRN
Status: DISCONTINUED | OUTPATIENT
Start: 2020-06-16 | End: 2020-06-16

## 2020-06-16 RX ORDER — ATORVASTATIN CALCIUM 20 MG/1
20 TABLET, FILM COATED ORAL DAILY
Status: DISCONTINUED | OUTPATIENT
Start: 2020-06-16 | End: 2020-06-25 | Stop reason: HOSPADM

## 2020-06-16 RX ORDER — FUROSEMIDE 10 MG/ML
40 INJECTION INTRAMUSCULAR; INTRAVENOUS ONCE
Status: COMPLETED | OUTPATIENT
Start: 2020-06-16 | End: 2020-06-16

## 2020-06-16 RX ORDER — GABAPENTIN 300 MG/1
300 CAPSULE ORAL 2 TIMES DAILY
Status: ON HOLD | COMMUNITY
Start: 2020-06-29 | End: 2020-08-29

## 2020-06-16 RX ORDER — BUPROPION HYDROCHLORIDE 150 MG/1
150 TABLET, EXTENDED RELEASE ORAL 2 TIMES DAILY
COMMUNITY
End: 2020-07-15

## 2020-06-16 RX ORDER — NICOTINE 21 MG/24HR
1 PATCH, TRANSDERMAL 24 HOURS TRANSDERMAL DAILY
Status: DISCONTINUED | OUTPATIENT
Start: 2020-06-16 | End: 2020-06-25 | Stop reason: HOSPADM

## 2020-06-16 RX ORDER — MONTELUKAST SODIUM 10 MG/1
10 TABLET ORAL AT BEDTIME
Status: DISCONTINUED | OUTPATIENT
Start: 2020-06-16 | End: 2020-06-25 | Stop reason: HOSPADM

## 2020-06-16 RX ORDER — ASPIRIN 325 MG
325 TABLET ORAL DAILY
Status: DISCONTINUED | OUTPATIENT
Start: 2020-06-16 | End: 2020-06-19

## 2020-06-16 RX ORDER — GUAR GUM
1 PACKET (EA) ORAL 3 TIMES DAILY
Status: DISCONTINUED | OUTPATIENT
Start: 2020-06-16 | End: 2020-06-25 | Stop reason: HOSPADM

## 2020-06-16 RX ORDER — FUROSEMIDE 20 MG
20 TABLET ORAL DAILY
Status: DISCONTINUED | OUTPATIENT
Start: 2020-06-16 | End: 2020-06-19

## 2020-06-16 RX ORDER — BACLOFEN 10 MG/1
10 TABLET ORAL 3 TIMES DAILY
Status: ON HOLD | COMMUNITY
End: 2020-06-25

## 2020-06-16 RX ADMIN — MULTIVITAMIN 15 ML: LIQUID ORAL at 08:40

## 2020-06-16 RX ADMIN — RIFAXIMIN 550 MG: 550 TABLET ORAL at 08:40

## 2020-06-16 RX ADMIN — ASPIRIN 325 MG ORAL TABLET 325 MG: 325 PILL ORAL at 08:50

## 2020-06-16 RX ADMIN — THIAMINE HCL TAB 100 MG 100 MG: 100 TAB at 20:04

## 2020-06-16 RX ADMIN — CEFEPIME 2 G: 2 INJECTION, POWDER, FOR SOLUTION INTRAVENOUS at 15:07

## 2020-06-16 RX ADMIN — Medication 5000 UNITS: at 15:06

## 2020-06-16 RX ADMIN — Medication 1 PACKET: at 13:25

## 2020-06-16 RX ADMIN — POTASSIUM PHOSPHATE, MONOBASIC AND POTASSIUM PHOSPHATE, DIBASIC 20 MMOL: 224; 236 INJECTION, SOLUTION INTRAVENOUS at 06:24

## 2020-06-16 RX ADMIN — Medication 0.4 MG: at 02:36

## 2020-06-16 RX ADMIN — CEFEPIME 2 G: 2 INJECTION, POWDER, FOR SOLUTION INTRAVENOUS at 03:52

## 2020-06-16 RX ADMIN — THIAMINE HCL TAB 100 MG 100 MG: 100 TAB at 08:40

## 2020-06-16 RX ADMIN — MONTELUKAST 10 MG: 10 TABLET, FILM COATED ORAL at 22:42

## 2020-06-16 RX ADMIN — ACETAMINOPHEN 975 MG: 325 TABLET, FILM COATED ORAL at 15:07

## 2020-06-16 RX ADMIN — ALBUTEROL SULFATE 2.5 MG: 2.5 SOLUTION RESPIRATORY (INHALATION) at 11:22

## 2020-06-16 RX ADMIN — SODIUM BICARBONATE 650 MG TABLET 650 MG: at 08:40

## 2020-06-16 RX ADMIN — FLUTICASONE FUROATE AND VILANTEROL TRIFENATATE 1 PUFF: 200; 25 POWDER RESPIRATORY (INHALATION) at 14:26

## 2020-06-16 RX ADMIN — Medication 40 MG: at 15:43

## 2020-06-16 RX ADMIN — ALBUTEROL SULFATE 2.5 MG: 2.5 SOLUTION RESPIRATORY (INHALATION) at 17:26

## 2020-06-16 RX ADMIN — GABAPENTIN 300 MG: 300 CAPSULE ORAL at 08:40

## 2020-06-16 RX ADMIN — Medication 5 MG: at 08:41

## 2020-06-16 RX ADMIN — Medication 0.4 MG: at 00:05

## 2020-06-16 RX ADMIN — THIAMINE HCL TAB 100 MG 100 MG: 100 TAB at 13:18

## 2020-06-16 RX ADMIN — RIFAXIMIN 550 MG: 550 TABLET ORAL at 20:04

## 2020-06-16 RX ADMIN — Medication 5000 UNITS: at 08:40

## 2020-06-16 RX ADMIN — NICOTINE 1 PATCH: 14 PATCH, EXTENDED RELEASE TRANSDERMAL at 09:30

## 2020-06-16 RX ADMIN — SODIUM CHLORIDE, POTASSIUM CHLORIDE, SODIUM LACTATE AND CALCIUM CHLORIDE: 600; 310; 30; 20 INJECTION, SOLUTION INTRAVENOUS at 00:06

## 2020-06-16 RX ADMIN — FUROSEMIDE 40 MG: 10 INJECTION, SOLUTION INTRAVENOUS at 11:51

## 2020-06-16 RX ADMIN — FUROSEMIDE 20 MG: 20 TABLET ORAL at 08:50

## 2020-06-16 RX ADMIN — FOLIC ACID 1 MG: 1 TABLET ORAL at 08:40

## 2020-06-16 RX ADMIN — Medication 5 MG: at 15:07

## 2020-06-16 RX ADMIN — Medication 1 PACKET: at 22:42

## 2020-06-16 RX ADMIN — ACETAMINOPHEN 975 MG: 325 TABLET, FILM COATED ORAL at 08:40

## 2020-06-16 RX ADMIN — INSULIN ASPART 1 UNITS: 100 INJECTION, SOLUTION INTRAVENOUS; SUBCUTANEOUS at 15:21

## 2020-06-16 RX ADMIN — INSULIN ASPART 1 UNITS: 100 INJECTION, SOLUTION INTRAVENOUS; SUBCUTANEOUS at 20:04

## 2020-06-16 RX ADMIN — Medication 40 MG: at 08:41

## 2020-06-16 RX ADMIN — GABAPENTIN 300 MG: 300 CAPSULE ORAL at 15:07

## 2020-06-16 ASSESSMENT — ACTIVITIES OF DAILY LIVING (ADL)
ADLS_ACUITY_SCORE: 20
ADLS_ACUITY_SCORE: 24
ADLS_ACUITY_SCORE: 20
ADLS_ACUITY_SCORE: 20
ADLS_ACUITY_SCORE: 24
ADLS_ACUITY_SCORE: 24

## 2020-06-16 NOTE — PLAN OF CARE
Discharge Planner PT   Patient plan for discharge: not discussed   Current status: Lifted from bed to chair via sling sheet in order to keep L posterior hip precautions. When sitting, able to perform TKE in order to increase functional strength and tolerance for mobility.    Barriers to return to prior living situation: medical status, cognitive impairment, weakness  Recommendations for discharge: TCU  Rationale for recommendations: pt will benefit from continued rehab in order to maximize IND with all ADLs and mobility.        Entered by: Musa Huitron 06/16/2020 4:14 PM

## 2020-06-16 NOTE — PROGRESS NOTES
General acute hospital, [place]  Procedure Note          Extubation:       Abhijeet Mccauley  MRN# [id number]   11:00 AM, June 16, 2020         Patient extubated at: June 16, 2020, 11:00 AM   Supplemental Oxygen: Via nasal cannula at 4 liters per minute   Cough: The cough is weak and productive   Secretion Mode: PRN suction with assistance   Secretion Amount: Moderate amount, moderately thick and tan in color   Respiratory Exam:: Breath sounds: crackles and expiratory wheezes     Location: left upper lobe and right upper lobe   Skin Exam:: Patient color: natural   Patient Status: Currently appears comfortable   Arterial Blood Gasses: pH Arterial (pH)   Date Value   06/16/2020 7.38     pO2 Arterial (mm Hg)   Date Value   06/16/2020 103     pCO2 Arterial (mm Hg)   Date Value   06/16/2020 34 (L)     Bicarbonate Arterial (mmol/L)   Date Value   06/16/2020 20 (L)            RT gave patient Albuterol nebulizer after extubation for expiratory wheezes. Patient had increased aeration after neb, RT will continue to follow.    Recorded by Barbara Sparks

## 2020-06-16 NOTE — PROGRESS NOTES
Major Shift Events:  Neuro exam remains unchanged- patient lethargic with intermittent periods of agitation (dilaudid given for nonverbal pain cues). Patient intermittently following commands and pupils reactive to light. Cardiac rhythm remained sinus rhythm overnight with no ectopy, norepinephrine titrated off, MAP sustaining >65. NJ intact with tube feed running at 40 with goal of 50. Loose BM x2 overnight- stool sample sent, c-diff negative. OG to LIS, ga intact with output WNL.     Plan: Replace electrolytes per protocol, continue POC and update team with any acute changes.   For vital signs and complete assessments, please see documentation flowsheets.

## 2020-06-16 NOTE — PROGRESS NOTES
Urology Brief Note:    59 year old man POD #4 s/p left ureteral stent placement for obstructing stone/urosepsis.    Patient appears to be improving clinically, extubated today, afebrile and improving leukocytosis although still with altered mental status.    Patient seen today but not ready to discuss plan per urology so sister was called to discuss.    Plan:  - Patient has been scheduled for office visit with Dr. Hale in ~1 month, sister aware  - Please page urology prior to discharge so we can discuss plan with patient in person  - Urology will sign off, please page on-call with questions    Plan per staff Dr. Zoie Jack MD  Urology PGY2

## 2020-06-16 NOTE — PROGRESS NOTES
"   06/16/20 1500   Quick Adds   Type of Visit Initial PT Evaluation   Living Environment   Lives With sibling(s)   Living Arrangements house   Home Accessibility stairs to enter home   Number of Stairs, Main Entrance 3   Stair Railings, Main Entrance railings safe and in good condition   Transportation Anticipated family or friend will provide   Living Environment Comment PLOF gathered from sister via phone.  Pt rents sisters \"workshop\".  IND with all ADL's.  4WW and IND with mobility.  Not active, usually sits at home, watches tv.     Self-Care   Usual Activity Tolerance good   Current Activity Tolerance poor   Regular Exercise No   Equipment Currently Used at Home walker, rolling   Functional Level Prior   Ambulation 1-->assistive equipment   Transferring 1-->assistive equipment   Toileting 0-->independent   Bathing 0-->independent   Communication 0-->understands/communicates without difficulty   Fall history within last six months yes   Number of times patient has fallen within last six months 3   Which of the above functional risks had a recent onset or change? ambulation;transferring;fall history   General Information   Onset of Illness/Injury or Date of Surgery - Date 06/12/20   Referring Physician Briseida Can MD    Patient/Family Goals Statement none stated, sister agreeable to TCU   Pertinent History of Current Problem (include personal factors and/or comorbidities that impact the POC) 59 year old male with hx of alcohol abuse, liver cirrhosis MELD score 16, COPD, ulcerative colitis and renal insufficiency, 2 weeks s/p left hip replacement, presents after multiple falls, sustained R rib 3-8 fx, UA was positive, intubated in [facility] for seizures.   Precautions/Limitations left hip precautions;fall precautions   Heart Disease Risk Factors Gender;Age;Medical history   Cognitive Status Examination   Orientation person;place   Level of Consciousness lethargic/somnolent   Follows Commands and Answers " "Questions 50% of the time   Personal Safety and Judgment impaired   Memory impaired   Posture    Posture Comments kyphotic sitting posture    Range of Motion (ROM)   ROM Comment WFL   Strength   Strength Comments not assessed   Bed Mobility   Bed Mobility Comments lifted from bed to chair via sling sheet in order to keep L hip precautions    Transfer Skills   Transfer Comments NA   Gait   Gait Comments NA   Balance   Balance Comments NA   General Therapy Interventions   Planned Therapy Interventions balance training;bed mobility training;gait training;motor coordination training;neuromuscular re-education;strengthening;stretching;transfer training;risk factor education;home program guidelines;progressive activity/exercise   Clinical Impression   Criteria for Skilled Therapeutic Intervention yes, treatment indicated   PT Diagnosis Impaired functional mobility s/p L OSCAR   Influenced by the following impairments weakness,  confusion, medical status   Functional limitations due to impairments IND mobility   Clinical Presentation Evolving/Changing   Clinical Presentation Rationale clinical judgement   Clinical Decision Making (Complexity) Moderate complexity   Therapy Frequency 5x/week   Predicted Duration of Therapy Intervention (days/wks) 2 wks   Anticipated Discharge Disposition Transitional Care Facility   Risk & Benefits of therapy have been explained Yes   Patient, Family & other staff in agreement with plan of care Yes   [facility] [employer] TM \"6 Clicks\"   2016, Trustees of [facility], under license to [employer].  All rights reserved.   6 Clicks Short Forms Basic Mobility Inpatient Short Form   [facility] [employer]  \"6 Clicks\" V.2 Basic Mobility Inpatient Short Form   1. Turning from your back to your side while in a flat bed without using bedrails? 1 - Total   2. Moving from lying on your back to sitting on the side of a flat bed without using bedrails? 1 - Total   3. Moving to and from a bed " to a chair (including a wheelchair)? 1 - Total   4. Standing up from a chair using your arms (e.g., wheelchair, or bedside chair)? 1 - Total   5. To walk in hospital room? 1 - Total   6. Climbing 3-5 steps with a railing? 1 - Total   Basic Mobility Raw Score (Score out of 24.Lower scores equate to lower levels of function) 6   Total Evaluation Time   Total Evaluation Time (Minutes) 10

## 2020-06-16 NOTE — PHARMACY-ADMISSION MEDICATION HISTORY
Admission medication history interview status for the 6/12/2020 admission is complete. See Epic admission navigator for allergy information, pharmacy, prior to admission medications and immunization status.     Medication history interview sources:  [facility] ([phone]), , Dispense Report, Chart Review    Changes made to PTA medication list (reason)  Added: None  Deleted:     Nystatin suspension (therapy complete)    Tiotropium capsule (last filled in 2018)  Changed: None    Additional medication history information (including reliability of information, actions taken by pharmacist):  Unable to discuss medications with Mr. Mccauley due to current clinical status. Current medications confirmed with recent fill history from pharmacy and . Recommend speaking with patient to confirm medications when able.    Prior to Admission medications    Medication Sig Last Dose Taking? Auth Provider   acetaminophen 500 MG PO tablet Take 1-2 tablets (500-1,000 mg) by mouth every 6 hours as needed for mild pain Do not take more than 3000mg acetaminophen total in one day. Unknown at Unknown time  Chidi Valenzuela MD   albuterol (VENTOLIN HFA) 108 (90 Base) MCG/ACT inhaler Inhale 2 puffs into the lungs every 4 hours as needed for shortness of breath / dyspnea or wheezing Unknown at Unknown time  Josefina Eden DO   aspirin (ASA) 81 MG EC tablet Take 4 tablets (325 mg) by mouth daily Unknown at Unknown time  Carlton Jones MD   atorvastatin 20 MG PO tablet Take 1 tablet (20 mg) by mouth daily Unknown at Unknown time  Chidi Valenzuela MD   baclofen (LIORESAL) 10 MG tablet Take 10 mg by mouth 3 times daily Unknown at Unknown time  Unknown, Entered By History   buPROPion (WELLBUTRIN SR) 150 MG 12 hr tablet Take 150 mg by mouth 2 times daily Unknown at Unknown time  Unknown, Entered By History   desonide (DESOWEN) 0.05 % external cream Apply 1 Application topically 2 times daily  Unknown at Unknown time   Reported, Patient   diclofenac (VOLTAREN) 1 % topical gel PLACE 2 GRAMS ONTO THE SKIN FOUR TIMES A DAY AS NEEDED FOR MODERATE PAIN Unknown at Unknown time  Chidi Valenzuela MD   docusate sodium (COLACE) 100 MG tablet Take 1 tablet (100 mg) by mouth 2 times daily as needed for constipation Unknown at Unknown time  Chidi Valenzuela MD   DULERA 200-5 MCG/ACT inhaler INHALE TWO PUFFS BY MOUTH TWICE A DAY Unknown at Unknown time  Bryanna Valenzuela APRN CNP   folic acid (FOLVITE) 1 MG tablet Take 2 tablets (2 mg) by mouth daily Unknown at Unknown time  Dangelo Rwoe MD   furosemide (LASIX) 20 MG tablet Take 1 tablet (20 mg) by mouth every morning Unknown at Unknown time  Chidi Valenzuela MD   gabapentin (NEURONTIN) 300 MG capsule Take 600 mg by mouth 3 times daily Unknown at Unknown time  Unknown, Entered By History   hydrOXYzine (ATARAX) 25 MG tablet Take 1-2 tablets (25-50 mg) by mouth every 6 hours as needed for itching or anxiety (Take with pain meds.  Helps with nausea, muscle spasms) Unknown at Unknown time  Carlton Jones MD   loratadine (CLARITIN) 10 MG tablet Take 1 tablet (10 mg) by mouth daily as needed for allergies Unknown at Unknown time  Chidi Valenzuela MD   montelukast (SINGULAIR) 10 MG tablet Take 1 tablet (10 mg) by mouth At Bedtime Unknown at Unknown time  Chidi Valenzuela MD   nicotine 2 MG MT lozenge Place 1 lozenge (2 mg) inside cheek every hour as needed for smoking cessation Unknown at Unknown time  Chidi Valenzuela MD   order for DME Equipment being ordered: 4 wheel walker   Chidi Valenzuela MD   order for DME Equipment being ordered: 2 pairs thigh high compression stockings, strength per patient preference   Chidi Valenzuela MD   order for DME Equipment being ordered: Compression Stockings TWO (2) Pairs, 15-20 mm Hg.   Chidi Valenzuela MD   order for DME Equipment being ordered: bed pull up bar   Chidi Valenzuela MD   order for DME Equipment being ordered: shower chair   Chidi Valenzuela MD    oxyCODONE (ROXICODONE) 5 MG tablet Take 1-2 tablets (5-10 mg) by mouth every 3 hours as needed Unknown at Unknown time  Carlton Jones MD   pantoprazole (PROTONIX) 40 MG EC tablet Take 1 tablet (40 mg) by mouth 2 times daily Unknown at Unknown time  Wilver Marurfo MD   polyethylene glycol (MIRALAX) 17 g packet Take 17 g by mouth daily Unknown at Unknown time  Chidi Valenzuela MD   potassium chloride ER (KLOR-CON M) 10 MEQ CR tablet Take 10 mEq by mouth daily  Unknown at Unknown time  Reported, Patient   propranolol (INDERAL) 20 MG tablet Take 1 tablet (20 mg) by mouth 2 times daily Unknown at Unknown time  Chidi Valenzuela MD   rifaximin (XIFAXAN) 550 MG TABS tablet Take 1 tablet (550 mg) by mouth 2 times daily Unknown at Unknown time  Chidi Valenzuela MD   senna-docusate (SENOKOT-S/PERICOLACE) 8.6-50 MG tablet Take 1-2 tablets by mouth daily as needed for constipation Take while on oral narcotics to prevent or treat constipation. Unknown at Unknown time  Carlton Jones MD   spironolactone (ALDACTONE) 50 MG tablet Take 50 mg by mouth daily Unknown at Unknown time  Reported, Patient   traMADol (ULTRAM) 50 MG tablet TAKE ONE TABLET BY MOUTH EVERY 6 HOURS AS NEEDED FOR SEVERE PAIN. MAX 4 TABLETS PER DAY. MUST LAST 30 DAYS. Unknown at Unknown time  Chidi Valenzuela MD   traZODone (DESYREL) 50 MG tablet TAKE ONE TABLET BY MOUTH ONCE DAILY AT BEDTIME Unknown at Unknown time  Chidi Valenzuela MD   trolamine salicylate (ASPERCREME) 10 % external cream Apply topically as needed for moderate pain Unknown at Unknown time  Wilver Marrufo MD     Medication history completed by:  Taty López (Sasha), PharmD, [facility]  PGY-1 Pharmacy Resident

## 2020-06-16 NOTE — PROGRESS NOTES
SURGICAL ICU PROGRESS NOTE  June 16, 2020      PRIMARY TEAM: Trauma  PRIMARY PHYSICIAN: Dr. Walls     REASON FOR CRITICAL CARE ADMISSION: Intubated and hemodynamic monitoring  ADMITTING PHYSICIAN: Dr. Dodson        ASSESSMENT: Abhijeet Mccauley is a 59 year old male with hx of alcohol abuse, liver cirrhosis MELD score 16, COPD, ulcerative colitis and renal insufficiency, 2 weeks s/p left hip replacement, presents after multiple falls, sustained R rib 3-8 fx, UA was positive, intubated in [facility] for seizures.     Today's Changes:  - Extubation  - Nicotine patch added  - Discontinue bicarb tabs   - Check CK, can restart statin if OK  - Restart furosemide and ASA 81 mg daily  - Stop IVFs     PLAN:   Neuro/ pain/ sedation:  # Possible seizure disorder  Monitor neurological status. Notify the MD for any acute changes in exam.   CIWA protocol              - PRN ativan 1-2mg q4h prn, none given overnight    - Haloperidol 5 mg Q4H PRN for agitation     Pain:   Dilaudid 0.2 - 0.4 mg Q1H PRN   baclofen 5 mg TID   acetaminophen 975 MG Q8H  gabapentin 300 mg Q8H     - nicotine patch added         RASS goal set at 0 to -1 (-3 documented)     Neuro critical care:  - Ok to stop vEEG (Will do for you)  - Continue to treat underlying infection, correct metabolic derangements as able.  - Continue to avoid CNS acting drugs  - NCC will sign off, please call for any questions #[phone]     Home meds  Hold for now: Tramadol, Trazadone, hydroxyzine, Bupropion    Pulmonary care:   VC              18/ 430/ 5/ 40%    ABG (6/15)   7.37/ 28/ 74/ 16 (13)    - follow up ABG   - Currently on PST      Albuterol 2.5 mg PRN     -Discontinue bicarb tabs     # Rib fractures  R rib 3-8 fx, multiple have anterior and posterior components    Look to restart home meds once he wakes up  Home meds  Albuterol, tiotropium; Montelukast, loratadine; Dulera      Cardiovascular:    # HLD  Monitor hemodynamic status.   - Off pressors 6/15    MAPs 80 - 104  HR 79 - 87,  sinus     Home meds  Astorvastatin 20 mg daily restart     check CK first  ASA 81 mg daily restart     GI care:   # Alcohol abuse, liver cirrhosis MELD score 16  NPO except meds  - Dulcolax supp 10 mg daily PRN, PEG daily PRN   Last BM 6/15   - TFs at 40 via NJT, to goal rate today  - Rifaximin 550 mg BID   - Thiamine and folic acid, multivitamin     - Furosemide 20 mg daily -- discontinued on 6/14, restart     Home meds  Hold on resrtating all anti hypertensives  Propranolol 20 mg BID  Spironolactone 50 mg daily      Fluids/ Electrolytes/ Nutrition:   - Electrolyte replacement protocol     Continue to monitor I/Os              I/O: 4.6/ 1.6 (+3.0)   UOP 1.7   mL/hr, stop      Cr 1.50 (1.69)  P 1.5    Lactate 1.5 (1.2)         Endocrine:    No history of diabetes  Sliding scale for diabetes management.                - 175   - No aspart     Hgb A1C test cancelled yesterday      ID/ Antibiotics:  #UTI, h/o enterobacter infections  - Cefepime (14 day course of total antibiotics, end 6/26)  - Left superior pole calectasis s/p cystoscopy with retrograde pyelogram and ureteral stent insertion 6/13  - Urology following:   - Continue catheter until afebrile/off pressors/off vent for 24 hrs   - Continue abx per primary, tailoring to cultures including intraop renal pelvis urine culture   - Urology will continue to follow, please call with questions   - Definitive stone treatment to be arranged upon resolution of acute illness     WBC 19.5 (30.4)  Afebrile    Bcx 6/12: Enterobacter cloacae  BCx 6/14 NGTD  Ucx 6/13: Enterobacter cloacae  C diff negative  Sputum Cx: Pending     Heme:     Hgb 7.8 (8.2)         Prophylaxis:    Mechanical prophylaxis for DVT  Protonix 40 mg BID enteral   SubQ heparin TID          Miscellaneous:    PT, OT, social work for dispo       Lines/ tubes/ drains:  ETT, NJT, PIV x2, urinary catheter      Disposition:  Surgical ICU.     Patient seen and discussed with staff.    Chente Tijerina,  MD    ====================================    TODAY'S SUBJECTIVE/INTERVAL HISTORY:   - C diff sent over night for 2 loose stools, returned negative  - Off pressors    OBJECTIVE:     Temp:  [97.5  F (36.4  C)-98.4  F (36.9  C)] 98.4  F (36.9  C)  Pulse:  [80] 80  Heart Rate:  [] 87  Resp:  [8-26] 8  BP: (105-113)/(46-64) 110/64  MAP:  [61 mmHg-114 mmHg] 64 mmHg  Arterial Line BP: ()/(43-78) 91/46  FiO2 (%):  [40 %-50 %] 40 %  SpO2:  [94 %-100 %] 99 %  Ventilation Mode: CPAP/PS  (Continuous positive airway pressure with Pressure Support)  FiO2 (%): 40 %  Rate Set (breaths/minute): 18 breaths/min  Tidal Volume Set (mL): 430 mL  PEEP (cm H2O): 5 cmH2O  Pressure Support (cm H2O): 7 cmH2O  Oxygen Concentration (%): 40 %  Resp: 8      I/O last 3 completed shifts:  In: 4527.15 [I.V.:3037.15; NG/GT:840]  Out: 2125 [Urine:1975; Emesis/NG output:150]    General/Neuro: Intubated and sedated, opens eyes to command and moves extremities  Pulm: Mechanically ventilated  Abd: soft; NT, ND  Extremities: +1 edema  Skin: warm and well-perfused.     LABS:   Arterial Blood Gases   Recent Labs   Lab 06/16/20  0845 06/15/20  0332 06/14/20  1553 06/14/20  0917   PH 7.38 7.37 7.33* 7.33*   PCO2 34* 28* 25* 22*   PO2 103 74* 96 85   HCO3 20* 16* 13* 11*     Complete Blood Count   Recent Labs   Lab 06/16/20  0344 06/15/20  0335 06/14/20  1327 06/14/20  0359   WBC 19.5* 30.4* 37.4* 49.1*   HGB 7.8* 8.2* 8.6* 9.6*   PLT 73* 96* 114* 155     Basic Metabolic Panel  Recent Labs   Lab 06/16/20  0344 06/15/20  0335 06/14/20  1841 06/14/20  0359    140 141 140   POTASSIUM 3.7 4.1 4.0 4.3   CHLORIDE 117* 115* 117* 114*   CO2 20 17* 16* 15*   BUN 29 27 23 20   CR 1.50* 1.69* 1.65* 1.88*   * 181* 146* 120*     Liver Function Tests  Recent Labs   Lab 06/13/20  0443 06/12/20  2209 06/12/20  2110 06/12/20  1005   AST 30 Canceled, Test credited Canceled, Test credited 38   ALT 14 16 16 19   ALKPHOS 353*  --  340* 464*   BILITOTAL  1.7*  --  2.0* 1.9*   ALBUMIN 2.3*  --  2.3* 2.9*   INR  --   --  1.64* 1.47*     Pancreatic Enzymes  Recent Labs   Lab 06/12/20  1005   LIPASE 192     Coagulation Profile  Recent Labs   Lab 06/12/20  2110 06/12/20  1005   INR 1.64* 1.47*   PTT 33 37         IMAGING:   Recent Results (from the past 24 hour(s))   XR Surgery HENRIETTA L/T 5 Min Fluoro w Stills    Narrative    XR SURGERY HENRIETTA FLUORO LESS THAN 5 MIN W STILLS 6/13/2020 11:46 PM     COMPARISON: 6/13/2020    HISTORY: Cystoscopy    NUMBER OF IMAGES ACQUIRED: 6    VIEWS: AP    FLUOROSCOPY TIME: 1.75 minute(s)      Impression    IMPRESSION: Fluoroscopy provided to the urology service.    ANGEL DAVID MD   XR Chest Port 1 View    Narrative    EXAM: XR CHEST PORT 1 VW 6/14/2020 12:10 AM      HISTORY: line placement, hypoxemia..    COMPARISON: 6/12/2020.     TECHNIQUE: Frontal supine view of the chest.    FINDINGS: Right internal jugular central venous catheter tip projects  near the right brachiocephalic vein. Endotracheal tube tip projects  over the low thoracic trachea. Enteric tube proximal sidehole projects  at the gastric cardia with tip collimated beyond view inferiorly.    Cardiomediastinal silhouette is stable with prominent left pericardial  fat pad. Low lung volumes with increased vascular crowding, perihilar  attenuation, and streaky basilar/retrocardiac opacities. Ill-defined  linear opacity over the central right midlung overlying posterior  right fifth rib fracture. Multiple right-sided rib fractures again  noted.      Impression    IMPRESSION:   Right internal jugular central venous catheter tip projects over the  right brachiocephalic vein.  Increased parahilar and basilar opacities, likely edema. Consider  atelectasis.        I have personally reviewed the examination and initial interpretation  and I agree with the findings.    ANGEL DAVID MD

## 2020-06-16 NOTE — PROGRESS NOTES
CLINICAL NUTRITION SERVICES - BRIEF NOTE    Pt with loose stools. Recommend to change any suspension medication to crushed to decrease amount of sorbitol pt is receiving.     INTERVENTIONS  Recommendations / Nutrition Prescription  Monitor ongoing stools trends    Implementation  RD to order 3 pkts NS fiber daily       Andreia Monsalve RD, MS, LD  SICU: [phone] *[phone]

## 2020-06-16 NOTE — PROVIDER NOTIFICATION
SICU notified of AM platelet result of 73, no new orders placed, will continue to monitor closely.

## 2020-06-16 NOTE — PLAN OF CARE
Major Shift Events:  Patient is still lethargic, but has been easier to arouse throughout shift. Intermittent agitation with some activity. Disoriented to time/situation; disoriented to place intermittently. Mobility has improved; able to  hands, move fingers, and lift legs. Extubated at 1100; has been on 3L NC with O2 sats in 90-100s. Tolerating well, but has lots of secretions. CV status stable without use of pressors. Diuresed with 60 mg lasix; output of 1800ml this shift. Rectal tube inserted d/t multiple liquid stools. Tube feeds increased to goal rate of 50 at 1400. Up to chair this evening w/ therapy.   Plan: Q4 neuros, monitor for delirium. SBP < 175, MAP < 65. PRN nebs available for wheezing; continue to encourage coughing/deep breathing. Monitor urinary output.   For vital signs and complete assessments, please see documentation flowsheets.     ?

## 2020-06-17 ENCOUNTER — APPOINTMENT (OUTPATIENT)
Dept: PHYSICAL THERAPY | Facility: CLINIC | Age: 60
End: 2020-06-17
Payer: COMMERCIAL

## 2020-06-17 LAB
ALBUMIN SERPL-MCNC: 1.8 G/DL (ref 3.4–5)
ALP SERPL-CCNC: 561 U/L (ref 40–150)
ALT SERPL W P-5'-P-CCNC: 22 U/L (ref 0–70)
ANION GAP SERPL CALCULATED.3IONS-SCNC: 7 MMOL/L (ref 3–14)
AST SERPL W P-5'-P-CCNC: 42 U/L (ref 0–45)
BACTERIA SPEC CULT: ABNORMAL
BACTERIA SPEC CULT: NORMAL
BASE DEFICIT BLDA-SCNC: 4.3 MMOL/L
BILIRUB SERPL-MCNC: 1.7 MG/DL (ref 0.2–1.3)
BUN SERPL-MCNC: 36 MG/DL (ref 7–30)
CALCIUM SERPL-MCNC: 8.2 MG/DL (ref 8.5–10.1)
CHLORIDE SERPL-SCNC: 117 MMOL/L (ref 94–109)
CO2 SERPL-SCNC: 20 MMOL/L (ref 20–32)
CREAT SERPL-MCNC: 1.48 MG/DL (ref 0.66–1.25)
ERYTHROCYTE [DISTWIDTH] IN BLOOD BY AUTOMATED COUNT: ABNORMAL % (ref 10–15)
GFR SERPL CREATININE-BSD FRML MDRD: 50 ML/MIN/{1.73_M2}
GLUCOSE BLDC GLUCOMTR-MCNC: 113 MG/DL (ref 70–99)
GLUCOSE BLDC GLUCOMTR-MCNC: 126 MG/DL (ref 70–99)
GLUCOSE BLDC GLUCOMTR-MCNC: 126 MG/DL (ref 70–99)
GLUCOSE BLDC GLUCOMTR-MCNC: 133 MG/DL (ref 70–99)
GLUCOSE BLDC GLUCOMTR-MCNC: 139 MG/DL (ref 70–99)
GLUCOSE BLDC GLUCOMTR-MCNC: 139 MG/DL (ref 70–99)
GLUCOSE BLDC GLUCOMTR-MCNC: 149 MG/DL (ref 70–99)
GLUCOSE SERPL-MCNC: 133 MG/DL (ref 70–99)
HCO3 BLD-SCNC: 19 MMOL/L (ref 21–28)
HCT VFR BLD AUTO: 27.2 % (ref 40–53)
HGB BLD-MCNC: 8.3 G/DL (ref 13.3–17.7)
LACTATE BLD-SCNC: 2 MMOL/L (ref 0.7–2)
MAGNESIUM SERPL-MCNC: 1.9 MG/DL (ref 1.6–2.3)
MCH RBC QN AUTO: 31.7 PG (ref 26.5–33)
MCHC RBC AUTO-ENTMCNC: 30.5 G/DL (ref 31.5–36.5)
MCV RBC AUTO: 104 FL (ref 78–100)
O2/TOTAL GAS SETTING VFR VENT: ABNORMAL %
PCO2 BLD: 29 MM HG (ref 35–45)
PH BLD: 7.44 PH (ref 7.35–7.45)
PHOSPHATE SERPL-MCNC: 1.2 MG/DL (ref 2.5–4.5)
PHOSPHATE SERPL-MCNC: 2.5 MG/DL (ref 2.5–4.5)
PLATELET # BLD AUTO: 89 10E9/L (ref 150–450)
PO2 BLD: 75 MM HG (ref 80–105)
POTASSIUM SERPL-SCNC: 3.9 MMOL/L (ref 3.4–5.3)
PROT SERPL-MCNC: 5.2 G/DL (ref 6.8–8.8)
RBC # BLD AUTO: 2.62 10E12/L (ref 4.4–5.9)
SODIUM SERPL-SCNC: 144 MMOL/L (ref 133–144)
SPECIMEN SOURCE: ABNORMAL
SPECIMEN SOURCE: NORMAL
WBC # BLD AUTO: 17.8 10E9/L (ref 4–11)

## 2020-06-17 PROCEDURE — 25000128 H RX IP 250 OP 636: Performed by: SURGERY

## 2020-06-17 PROCEDURE — 82803 BLOOD GASES ANY COMBINATION: CPT | Performed by: STUDENT IN AN ORGANIZED HEALTH CARE EDUCATION/TRAINING PROGRAM

## 2020-06-17 PROCEDURE — 40000275 ZZH STATISTIC RCP TIME EA 10 MIN

## 2020-06-17 PROCEDURE — 00000146 ZZHCL STATISTIC GLUCOSE BY METER IP

## 2020-06-17 PROCEDURE — 97110 THERAPEUTIC EXERCISES: CPT | Mod: GP

## 2020-06-17 PROCEDURE — 94640 AIRWAY INHALATION TREATMENT: CPT | Mod: 76

## 2020-06-17 PROCEDURE — 94640 AIRWAY INHALATION TREATMENT: CPT

## 2020-06-17 PROCEDURE — 25000128 H RX IP 250 OP 636: Performed by: STUDENT IN AN ORGANIZED HEALTH CARE EDUCATION/TRAINING PROGRAM

## 2020-06-17 PROCEDURE — 25800030 ZZH RX IP 258 OP 636: Performed by: STUDENT IN AN ORGANIZED HEALTH CARE EDUCATION/TRAINING PROGRAM

## 2020-06-17 PROCEDURE — 25000132 ZZH RX MED GY IP 250 OP 250 PS 637: Performed by: STUDENT IN AN ORGANIZED HEALTH CARE EDUCATION/TRAINING PROGRAM

## 2020-06-17 PROCEDURE — 25000132 ZZH RX MED GY IP 250 OP 250 PS 637: Performed by: SURGERY

## 2020-06-17 PROCEDURE — 27210437 ZZH NUTRITION PRODUCT SEMIELEM INTERMED LITER

## 2020-06-17 PROCEDURE — 85027 COMPLETE CBC AUTOMATED: CPT | Performed by: STUDENT IN AN ORGANIZED HEALTH CARE EDUCATION/TRAINING PROGRAM

## 2020-06-17 PROCEDURE — 25000125 ZZHC RX 250: Performed by: STUDENT IN AN ORGANIZED HEALTH CARE EDUCATION/TRAINING PROGRAM

## 2020-06-17 PROCEDURE — 20000004 ZZH R&B ICU UMMC

## 2020-06-17 PROCEDURE — 80053 COMPREHEN METABOLIC PANEL: CPT | Performed by: STUDENT IN AN ORGANIZED HEALTH CARE EDUCATION/TRAINING PROGRAM

## 2020-06-17 PROCEDURE — 83605 ASSAY OF LACTIC ACID: CPT | Performed by: STUDENT IN AN ORGANIZED HEALTH CARE EDUCATION/TRAINING PROGRAM

## 2020-06-17 PROCEDURE — 40000014 ZZH STATISTIC ARTERIAL MONITORING DAILY

## 2020-06-17 PROCEDURE — 84100 ASSAY OF PHOSPHORUS: CPT | Performed by: STUDENT IN AN ORGANIZED HEALTH CARE EDUCATION/TRAINING PROGRAM

## 2020-06-17 PROCEDURE — 99233 SBSQ HOSP IP/OBS HIGH 50: CPT | Mod: GC | Performed by: SURGERY

## 2020-06-17 PROCEDURE — 83735 ASSAY OF MAGNESIUM: CPT | Performed by: STUDENT IN AN ORGANIZED HEALTH CARE EDUCATION/TRAINING PROGRAM

## 2020-06-17 PROCEDURE — 97530 THERAPEUTIC ACTIVITIES: CPT | Mod: GP

## 2020-06-17 RX ORDER — OXYCODONE HYDROCHLORIDE 5 MG/1
5 TABLET ORAL EVERY 4 HOURS PRN
Status: DISCONTINUED | OUTPATIENT
Start: 2020-06-17 | End: 2020-06-24

## 2020-06-17 RX ORDER — GABAPENTIN 300 MG/1
600 CAPSULE ORAL EVERY 8 HOURS
Status: DISCONTINUED | OUTPATIENT
Start: 2020-06-17 | End: 2020-06-19

## 2020-06-17 RX ORDER — LIDOCAINE 4 G/G
1 PATCH TOPICAL
Status: DISCONTINUED | OUTPATIENT
Start: 2020-06-17 | End: 2020-06-25 | Stop reason: HOSPADM

## 2020-06-17 RX ORDER — PROPRANOLOL HYDROCHLORIDE 20 MG/1
20 TABLET ORAL 2 TIMES DAILY
Status: DISCONTINUED | OUTPATIENT
Start: 2020-06-17 | End: 2020-06-25 | Stop reason: HOSPADM

## 2020-06-17 RX ORDER — CLONIDINE HYDROCHLORIDE 0.1 MG/1
0.1 TABLET ORAL EVERY 8 HOURS PRN
Status: DISCONTINUED | OUTPATIENT
Start: 2020-06-17 | End: 2020-06-17

## 2020-06-17 RX ORDER — CLONIDINE HYDROCHLORIDE 0.1 MG/1
0.1 TABLET ORAL EVERY 8 HOURS
Status: DISCONTINUED | OUTPATIENT
Start: 2020-06-17 | End: 2020-06-20

## 2020-06-17 RX ORDER — BUPROPION HYDROCHLORIDE 150 MG/1
150 TABLET, EXTENDED RELEASE ORAL 2 TIMES DAILY
Status: DISCONTINUED | OUTPATIENT
Start: 2020-06-17 | End: 2020-06-17

## 2020-06-17 RX ADMIN — PROPRANOLOL HYDROCHLORIDE 20 MG: 10 TABLET ORAL at 10:33

## 2020-06-17 RX ADMIN — LIDOCAINE 1 PATCH: 560 PATCH PERCUTANEOUS; TOPICAL; TRANSDERMAL at 14:28

## 2020-06-17 RX ADMIN — PROPRANOLOL HYDROCHLORIDE 20 MG: 10 TABLET ORAL at 20:04

## 2020-06-17 RX ADMIN — Medication 5000 UNITS: at 00:24

## 2020-06-17 RX ADMIN — CLONIDINE HYDROCHLORIDE 0.1 MG: 0.1 TABLET ORAL at 16:15

## 2020-06-17 RX ADMIN — LORAZEPAM 1 MG: 2 INJECTION INTRAMUSCULAR; INTRAVENOUS at 01:58

## 2020-06-17 RX ADMIN — ACETAMINOPHEN 975 MG: 325 TABLET, FILM COATED ORAL at 16:15

## 2020-06-17 RX ADMIN — THIAMINE HCL TAB 100 MG 100 MG: 100 TAB at 14:28

## 2020-06-17 RX ADMIN — Medication 5 MG: at 08:25

## 2020-06-17 RX ADMIN — FLUTICASONE FUROATE AND VILANTEROL TRIFENATATE 1 PUFF: 200; 25 POWDER RESPIRATORY (INHALATION) at 08:24

## 2020-06-17 RX ADMIN — OXYCODONE HYDROCHLORIDE 5 MG: 5 TABLET ORAL at 23:50

## 2020-06-17 RX ADMIN — Medication 40 MG: at 16:17

## 2020-06-17 RX ADMIN — CLONIDINE HYDROCHLORIDE 0.1 MG: 0.1 TABLET ORAL at 23:49

## 2020-06-17 RX ADMIN — THIAMINE HCL TAB 100 MG 100 MG: 100 TAB at 08:24

## 2020-06-17 RX ADMIN — POTASSIUM & SODIUM PHOSPHATES POWDER PACK 280-160-250 MG 1 PACKET: 280-160-250 PACK at 08:56

## 2020-06-17 RX ADMIN — Medication 0.2 MG: at 09:26

## 2020-06-17 RX ADMIN — MAGNESIUM SULFATE IN WATER 2 G: 40 INJECTION, SOLUTION INTRAVENOUS at 09:28

## 2020-06-17 RX ADMIN — CLONIDINE HYDROCHLORIDE 0.1 MG: 0.1 TABLET ORAL at 08:24

## 2020-06-17 RX ADMIN — NICOTINE 1 PATCH: 14 PATCH, EXTENDED RELEASE TRANSDERMAL at 08:25

## 2020-06-17 RX ADMIN — FOLIC ACID 1 MG: 1 TABLET ORAL at 08:24

## 2020-06-17 RX ADMIN — Medication 5 MG: at 00:58

## 2020-06-17 RX ADMIN — ASPIRIN 325 MG ORAL TABLET 325 MG: 325 PILL ORAL at 08:24

## 2020-06-17 RX ADMIN — OXYCODONE HYDROCHLORIDE 5 MG: 5 TABLET ORAL at 15:12

## 2020-06-17 RX ADMIN — Medication 5000 UNITS: at 16:15

## 2020-06-17 RX ADMIN — FUROSEMIDE 20 MG: 20 TABLET ORAL at 08:24

## 2020-06-17 RX ADMIN — LORAZEPAM 1 MG: 1 TABLET ORAL at 10:33

## 2020-06-17 RX ADMIN — POTASSIUM & SODIUM PHOSPHATES POWDER PACK 280-160-250 MG 1 PACKET: 280-160-250 PACK at 19:58

## 2020-06-17 RX ADMIN — MULTIVITAMIN 15 ML: LIQUID ORAL at 08:24

## 2020-06-17 RX ADMIN — Medication 1 PACKET: at 19:36

## 2020-06-17 RX ADMIN — ATORVASTATIN CALCIUM 20 MG: 20 TABLET, FILM COATED ORAL at 08:24

## 2020-06-17 RX ADMIN — POTASSIUM PHOSPHATE, MONOBASIC AND POTASSIUM PHOSPHATE, DIBASIC 20 MMOL: 224; 236 INJECTION, SOLUTION INTRAVENOUS at 10:46

## 2020-06-17 RX ADMIN — INSULIN ASPART 1 UNITS: 100 INJECTION, SOLUTION INTRAVENOUS; SUBCUTANEOUS at 00:29

## 2020-06-17 RX ADMIN — GABAPENTIN 600 MG: 300 CAPSULE ORAL at 23:48

## 2020-06-17 RX ADMIN — ALBUTEROL SULFATE 2.5 MG: 2.5 SOLUTION RESPIRATORY (INHALATION) at 19:43

## 2020-06-17 RX ADMIN — CEFEPIME 2 G: 2 INJECTION, POWDER, FOR SOLUTION INTRAVENOUS at 04:01

## 2020-06-17 RX ADMIN — GABAPENTIN 300 MG: 300 CAPSULE ORAL at 00:58

## 2020-06-17 RX ADMIN — GABAPENTIN 600 MG: 300 CAPSULE ORAL at 16:15

## 2020-06-17 RX ADMIN — RIFAXIMIN 550 MG: 550 TABLET ORAL at 08:24

## 2020-06-17 RX ADMIN — ACETAMINOPHEN 975 MG: 325 TABLET, FILM COATED ORAL at 08:24

## 2020-06-17 RX ADMIN — MONTELUKAST 10 MG: 10 TABLET, FILM COATED ORAL at 23:36

## 2020-06-17 RX ADMIN — Medication 1 PACKET: at 14:28

## 2020-06-17 RX ADMIN — Medication 1 PACKET: at 08:26

## 2020-06-17 RX ADMIN — CEFEPIME 2 G: 2 INJECTION, POWDER, FOR SOLUTION INTRAVENOUS at 16:17

## 2020-06-17 RX ADMIN — Medication 5000 UNITS: at 23:57

## 2020-06-17 RX ADMIN — Medication 40 MG: at 08:24

## 2020-06-17 RX ADMIN — Medication 5 MG: at 23:50

## 2020-06-17 RX ADMIN — POTASSIUM & SODIUM PHOSPHATES POWDER PACK 280-160-250 MG 1 PACKET: 280-160-250 PACK at 14:28

## 2020-06-17 RX ADMIN — THIAMINE HCL TAB 100 MG 100 MG: 100 TAB at 22:25

## 2020-06-17 RX ADMIN — ACETAMINOPHEN 975 MG: 325 TABLET, FILM COATED ORAL at 23:48

## 2020-06-17 RX ADMIN — Medication 5 MG: at 16:15

## 2020-06-17 RX ADMIN — GABAPENTIN 300 MG: 300 CAPSULE ORAL at 08:24

## 2020-06-17 RX ADMIN — OXYCODONE HYDROCHLORIDE 5 MG: 5 TABLET ORAL at 10:33

## 2020-06-17 RX ADMIN — ALBUTEROL SULFATE 2.5 MG: 2.5 SOLUTION RESPIRATORY (INHALATION) at 03:25

## 2020-06-17 RX ADMIN — ACETAMINOPHEN 975 MG: 325 TABLET, FILM COATED ORAL at 00:17

## 2020-06-17 RX ADMIN — RIFAXIMIN 550 MG: 550 TABLET ORAL at 20:04

## 2020-06-17 ASSESSMENT — ACTIVITIES OF DAILY LIVING (ADL)
WHICH_OF_THE_ABOVE_FUNCTIONAL_RISKS_HAD_A_RECENT_ONSET_OR_CHANGE?: AMBULATION;TRANSFERRING;TOILETING;BATHING;DRESSING;EATING;SWALLOWING;COGNITION
RETIRED_EATING: 4-->COMPLETELY DEPENDENT
TOILETING: 3-->ASSISTIVE EQUIPMENT AND PERSON
TRANSFERRING: 3-->ASSISTIVE EQUIPMENT AND PERSON
COGNITION: 1 - ATTENTION OR MEMORY DEFICITS
ADLS_ACUITY_SCORE: 22
ADLS_ACUITY_SCORE: 36
FALL_HISTORY_WITHIN_LAST_SIX_MONTHS: YES
ADLS_ACUITY_SCORE: 36
NUMBER_OF_TIMES_PATIENT_HAS_FALLEN_WITHIN_LAST_SIX_MONTHS: 3
BATHING: 3-->ASSISTIVE EQUIPMENT AND PERSON
ADLS_ACUITY_SCORE: 36
ADLS_ACUITY_SCORE: 36
DRESS: 4-->COMPLETELY DEPENDENT
SWALLOWING: 2-->DIFFICULTY SWALLOWING LIQUIDS
AMBULATION: 3-->ASSISTIVE EQUIPMENT AND PERSON
RETIRED_COMMUNICATION: 0-->UNDERSTANDS/COMMUNICATES WITHOUT DIFFICULTY
ADLS_ACUITY_SCORE: 36

## 2020-06-17 NOTE — PROGRESS NOTES
Brief trauma note  Brief HPI  59 year old male who underwent left hip replacement 2 weeks ago, found down in garage by home nurse, transferred to [facility] from [facility] after he became febrile, had multiple seizures, became less response, CIWA 26, subsequently intubated. Found to have multiple posterior left rib fractures, concerns for urosepsis, concerns for possible EtOH withdrawal.     Urology was consulted for possible obstructing left Ureteral stone and he underwent a left retrograde pyelogram with a left ureteral stent. He remains critically ill and is being treated for urosepsis. He was extubated today, remains altered.    Traumatic injuries:   Right sided rib fractures 3-8th with anterior and posterior components  - Pain control, Aggressive pulmonary hygiene: IS, Flutter valve, early mobility    Neuro: mumbles, altered  Resp: exp wheezes, congested cough  Cardiac: S1S2, tachy  GI: Rounded, soft  : Chong with susan colored urine  Vitals:    06/17/20 1100 06/17/20 1200 06/17/20 1300 06/17/20 1400   BP:  107/64 101/60 103/62   BP Location:  Right arm     Pulse:  84 82 83   Resp: 12 13 13 13   Temp:  98.4  F (36.9  C)     TempSrc:  Axillary     SpO2: 99% 99% 98% 98%   Weight:         Appreciate SICU management of other critical issues    Gunnar Roblero [facility]  Trauma Service

## 2020-06-17 NOTE — PLAN OF CARE
Neuro: Confused (oriented to self and place intermittently), agitated this morning. PRN dilaudid given x1 and PRN oxy started. 1mg PO ativan given for CIWA 8. Able to intermittently follow commands. Resists turns and movement- therapy attempted edge of bed.  Cardio: ST prior to meds this AM- now SR for the remainder of the day. Stable BP, afebrile.  Resp: 1L NC, upper airway wheeze when agitated- resolves when resting or asleep. Able to do IS to 600mL.  GI: RT with large output. TF at goal.  : Chong with adequate output- to remain in place per Dr. Esparza  Skin: Multiple penile wounds- cleansed and barrier cream. L hip incision with dermabond- CDI/DAPHNE. Multiple areas of scattered bruising.  Plan: Continue to monitor and provide pain medications and meds for ETOH withdrawal.

## 2020-06-17 NOTE — PLAN OF CARE
D: Increasing respiratory rate, shallow breathes. Ativan 1 mg x 1 for agitation.     Neuro: Alert, confused, oriented to self, hospital, drowsy. Pupils 5/brisk /gazes in each direction not to command. SHOEMAKER all warm/normal pulses. UE stronger than LE.   VS: Afeb, RR 23-36, -120, -160/63-75 maps .   CV: ST  Pulm: LS clear/diminished with upper airway wheeze, occasional R Upper wheeze, albuterol neb x 1, used the IS every 2 hours high of 1000, lately to 500, strong cough.   GI: Abd distended, BS +. TF at goal 50 ml/hour with 60 ml q 4 hours(=30 every 2 hours) free water. Rectal tube with liquid stool in bag and tubing, after fiber, he forced thicker stool around rectal tube, will monitor if RT needed.   : Chong by urology, with large output after lasix, now decreasing, cloudy susan. Meatus/penis head with red, purulent drng, cleansed, barrier cream, gauze placed.   Lines/Gtts: R PIV x 2, saline locked. R internal jugular TL saline locked. L arterial line with good waveform.   Skin:   urinary meatus as above. L hip wound with drsg intact, ecchymotic, pain with turns, falls asleep quickly after.   Drains: Chong .   Labs: see labs.   P: Pulmonary toilet. Increase activity, monitor for safety. Reorient and reassure. Monitor I/O with diuretics.

## 2020-06-17 NOTE — PROGRESS NOTES
SURGICAL ICU PROGRESS NOTE  June 17, 2020      PRIMARY TEAM: Trauma  PRIMARY PHYSICIAN: Dr. Walls     REASON FOR CRITICAL CARE ADMISSION: Intubated and hemodynamic monitoring  ADMITTING PHYSICIAN: Dr. Dodson        ASSESSMENT: Abhijeet Mccauley is a 59 year old male with hx of alcohol abuse, liver cirrhosis MELD score 16, COPD, ulcerative colitis and renal insufficiency, 2 weeks s/p left hip replacement, presents after multiple falls, sustained R rib 3-8 fx, UA was positive, intubated in [facility] for seizures.     Today's Changes:  - Start clonidine, increase gabapentin to 600 mg Q8H, oxycodone added  - RAP consult for regional block  - Neutraphos TID     PLAN:   Neuro/ pain/ sedation:  # Possible seizure disorder  Monitor neurological status. Notify the MD for any acute changes in exam.   CIWA protocol              - PRN ativan 1-2mg q4h prn   - Clonidine 0.1 mg Q8H   - Gabapentin 600 mg Q8H       Pain:   Dilaudid 0.2 - 0.4 mg Q1H PRN   Oxycodone 5 mg Q4H  baclofen 5 mg TID   acetaminophen 975 MG Q8H    - Haloperidol 5 mg Q4H PRN for agitation    - nicotine patch     - RAPs to place regional block        Neuro critical care:  - Ok to stop vEEG (Will do for you)  - Continue to treat underlying infection, correct metabolic derangements as able.  - Continue to avoid CNS acting drugs  - NCC will sign off, please call for any questions #[phone]     Home meds  Hold for now: Tramadol, Trazadone, hydroxyzine    Pulmonary care:   SpO2 95 - 99% on 3 LNC     - Breo, montelukast 10 mg at bedtime  - Albuterol 2.5 mg PRN     # Rib fractures  R rib 3-8 fx, multiple have anterior and posterior components   Encourage use of IS      Cardiovascular:    # HLD  Monitor hemodynamic status.     MAPs 95 - 107   - 125, sinus    - Atorvastatin 20 mg daily  -  mg daily restart  - Furosemide 20 mg daily  - Propranolol 20 mg BID       GI care:   # Alcohol abuse, liver cirrhosis MELD score 16  NPO except meds  - TFs via NJT at 50 which  goal rate  - Rifaximin 550 mg BID   - Thiamine and folic acid, multivitamin     - Dulcolax supp 10 mg daily PRN, PEG daily PRN   Last BM 6/17     Home meds  Hold on resrtating all anti hypertensives  Propranolol 20 mg BID  Spironolactone 50 mg daily    LFTs  Tbili 1.7 (1.7)  Alk phos 561 (353)  Ast/ALT WNL      Fluids/ Electrolytes/ Nutrition:   - Electrolyte replacement protocol     Continue to monitor I/Os              I/O: 3.2/ 3.5 (-300 mL)   UOP 3.2     Cr 1.48 (1.5)    Mg and Phos pending   Neutraphos TID, started     Urology  Plan:  - Patient has been scheduled for office visit with Dr. Hale in ~1 month, sister aware  - Please page urology prior to discharge so we can discuss plan with patient in person  - Urology will sign off, please page on-call with questions      Endocrine:    No history of diabetes  Sliding scale for diabetes management.                - 156   - 3 units aspart      ID/ Antibiotics:  #UTI, h/o enterobacter infections  - Cefepime (14 day course of total antibiotics, end 6/26)  - Left superior pole calectasis s/p cystoscopy with retrograde pyelogram and ureteral stent insertion 6/13  - Urology following:   - Continue catheter until afebrile/off pressors/off vent for 24 hrs   - Continue abx per primary, tailoring to cultures including intraop renal pelvis urine culture   - Urology will continue to follow, please call with questions   - Definitive stone treatment to be arranged upon resolution of acute illness     WBC 17.8 (19.5)  Afebrile    Bcx 6/12: Enterobacter cloacae  BCx 6/14 NGTD  Ucx 6/13: Enterobacter cloacae  C diff negative  Sputum Cx 6/15: NGTD     Heme:     Hgb 8.3 (7.8)  Plt 89 (73)         Prophylaxis:    Mechanical prophylaxis for DVT  Protonix 40 mg BID enteral   SubQ heparin TID          Miscellaneous:    PT, OT, social work for dispo       Lines/ tubes/ drains:  NJT, PIV x2, urinary catheter      Disposition:  Surgical ICU.     Patient seen and discussed with  staff.    Chente Tijerina MD    ====================================    TODAY'S SUBJECTIVE/INTERVAL HISTORY:   - 1 mg of Ativan given overnight for concern for withdrawal without improvement    OBJECTIVE:     Temp:  [98  F (36.7  C)-98.8  F (37.1  C)] 98.4  F (36.9  C)  Heart Rate:  [] 122  Resp:  [12-38] 38  MAP:  [70 mmHg-108 mmHg] 99 mmHg  Arterial Line BP: (100-167)/(50-75) 148/69  SpO2:  [95 %-100 %] 97 %  Ventilation Mode: CPAP/PS  (Continuous positive airway pressure with Pressure Support)  FiO2 (%): 40 %  Rate Set (breaths/minute): 18 breaths/min  Tidal Volume Set (mL): 430 mL  PEEP (cm H2O): 5 cmH2O  Pressure Support (cm H2O): 7 cmH2O  Oxygen Concentration (%): 40 %  Resp: (!) 38      I/O last 3 completed shifts:  In: 2699.5 [I.V.:629.5; NG/GT:940]  Out: 3755 [Urine:3535; Emesis/NG output:200; Stool:20]    General/Neuro: Awake and alert, answering questions appropriately  Pulm: Non-labored breathing on 2 LNC  Abd: soft; NT, ND  Extremities: +1 edema  Skin: warm and well-perfused.     LABS:   Arterial Blood Gases   Recent Labs   Lab 06/17/20  0921 06/16/20  1216 06/16/20  0845 06/15/20  0332   PH 7.44 7.39 7.38 7.37   PCO2 29* 34* 34* 28*   PO2 75* 110* 103 74*   HCO3 19* 20* 20* 16*     Complete Blood Count   Recent Labs   Lab 06/17/20  0358 06/16/20  0344 06/15/20  0335 06/14/20  1327   WBC 17.8* 19.5* 30.4* 37.4*   HGB 8.3* 7.8* 8.2* 8.6*   PLT 89* 73* 96* 114*     Basic Metabolic Panel  Recent Labs   Lab 06/17/20  0358 06/16/20  1209 06/16/20  0344 06/15/20  0335 06/14/20  1841     --  142 140 141   POTASSIUM 3.9 4.2 3.7 4.1 4.0   CHLORIDE 117*  --  117* 115* 117*   CO2 20  --  20 17* 16*   BUN 36*  --  29 27 23   CR 1.48*  --  1.50* 1.69* 1.65*   *  --  122* 181* 146*     Liver Function Tests  Recent Labs   Lab 06/17/20  0358 06/13/20  0443 06/12/20  2209 06/12/20  2110 06/12/20  1005   AST 42 30 Canceled, Test credited Canceled, Test credited 38   ALT 22 14 16 16 19   ALKPHOS 561* 353*   --  340* 464*   BILITOTAL 1.7* 1.7*  --  2.0* 1.9*   ALBUMIN 1.8* 2.3*  --  2.3* 2.9*   INR  --   --   --  1.64* 1.47*     Pancreatic Enzymes  Recent Labs   Lab 06/12/20  1005   LIPASE 192     Coagulation Profile  Recent Labs   Lab 06/12/20  2110 06/12/20  1005   INR 1.64* 1.47*   PTT 33 37         IMAGING:   Recent Results (from the past 24 hour(s))   XR Surgery HENRIETTA L/T 5 Min Fluoro w Stills    Narrative    XR SURGERY HENRIETTA FLUORO LESS THAN 5 MIN W STILLS 6/13/2020 11:46 PM     COMPARISON: 6/13/2020    HISTORY: Cystoscopy    NUMBER OF IMAGES ACQUIRED: 6    VIEWS: AP    FLUOROSCOPY TIME: 1.75 minute(s)      Impression    IMPRESSION: Fluoroscopy provided to the urology service.    ANGEL DAVID MD   XR Chest Port 1 View    Narrative    EXAM: XR CHEST PORT 1 VW 6/14/2020 12:10 AM      HISTORY: line placement, hypoxemia..    COMPARISON: 6/12/2020.     TECHNIQUE: Frontal supine view of the chest.    FINDINGS: Right internal jugular central venous catheter tip projects  near the right brachiocephalic vein. Endotracheal tube tip projects  over the low thoracic trachea. Enteric tube proximal sidehole projects  at the gastric cardia with tip collimated beyond view inferiorly.    Cardiomediastinal silhouette is stable with prominent left pericardial  fat pad. Low lung volumes with increased vascular crowding, perihilar  attenuation, and streaky basilar/retrocardiac opacities. Ill-defined  linear opacity over the central right midlung overlying posterior  right fifth rib fracture. Multiple right-sided rib fractures again  noted.      Impression    IMPRESSION:   Right internal jugular central venous catheter tip projects over the  right brachiocephalic vein.  Increased parahilar and basilar opacities, likely edema. Consider  atelectasis.        I have personally reviewed the examination and initial interpretation  and I agree with the findings.    ANGEL DAVID MD

## 2020-06-17 NOTE — PROGRESS NOTES
Brief trauma note  Brief HPI  59 year old male who underwent left hip replacement 2 weeks ago, found down in garage by home nurse, transferred to [facility] from [facility] after he became febrile, had multiple seizures, became less response, CIWA 26, subsequently intubated. Found to have multiple posterior left rib fractures, concerns for urosepsis, concerns for possible EtOH withdrawal.      Urology was consulted for possible obstructing left Ureteral stone and he underwent a left retrograde pyelogram with a left ureteral stent. He remains critically ill and is being treated for urosepsis.     Interval history  He was extubated yesterday. Agitated, restless overnight, tachy, ativan given for ETOH withdrawal overnight.  Nonsensical speech today, good cough effort.     Traumatic injuries:   Right sided rib fractures 3-8th with anterior and posterior components  - Pain control, Aggressive pulmonary hygiene: IS, Flutter valve, early mobility  - Discussed option for regional neuroaxial blockade with [facility]est[facility] today, not an ideal candidate. See their note.  Add Lidoderm patches and Menthol patches when Lido patches come off.  - Would benefit from a Medicine consult or transfer to medicine service when able to leave the ICU.    Exam/Vitals:  Temp: 98.4  F (36.9  C) Temp src: Axillary BP: 103/62 Pulse: 83 Heart Rate: 84 Resp: 13 SpO2: 98 % O2 Device: Nasal cannula Oxygen Delivery: 2 LPM     Neuro: Restless, Oriented to self  Cardiac: S1S2  Resp: Coarse, no wheezes  GI: Rounded and soft, +BS  : urine per ga, yellow  MSK: Moves all joints spont    Afanwi Kube CNP  Trauma

## 2020-06-17 NOTE — PLAN OF CARE
Discharge Planner PT   Patient plan for discharge: not discussed   Current status: SPO2 >90% on 2L. Remains fairly sedated due to concerns of possible EtOH withdrawal. Attempted supine>sit, however resistive. Not following commands. PROM the LE and calves stretched. Repositioned to L side, increased agitation with reposition.  Barriers to return to prior living situation: medical status, cognitive impairment, current mobility  Recommendations for discharge: TCU  Rationale for recommendations: pt will benefit from continued rehab in order to maximize IND with all ADLs and mobility.        Entered by: Ravin Stevens 06/17/2020 4:28 PM

## 2020-06-17 NOTE — CONSULTS
S: [facility] was consulted on patient Abhijeet Mccauley by the SICU team due to the patient's right 3-8 rib fractures and associated pain that he suffered 5 days ago following an unwitnessed fall. 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 At the time of our exam, the patient was reportedly achieving >600mL with use of incentive spirometer. The patient currently denies pain when prompted.    O: On exam, the patient is a disoriented adult male in no apparent distress. His SpO2 is >98% on 2L via NC. He is mumbling nonsensically, and his wrists are currently restrained for reported bouts of agitation.     A: This is a 60yo male with a PMHx s/f thrombocytopenia, urosepsis, alcohol induced hepatic encephalopathy who sustained right 3-8 rib fractures 5 days ago.    P: Abhijeet is not an ideal candidate for the following reasons:  - resolving sepsis raises concerns for placing an additional indwelling line  - thrombocytopenia and coagulopathy is a relative contraindication for placement of an erector spinae catheter  - the patient's agitation may increase risk of block placement complications  - unclear benefit of block given that the injury occurred 5 days ago  - the patient is able to achieve >600mL on IS, suggesting that he may not be splinting appreciably.  - we are happy to revisit with this patient for future consideration of block placement if analgesia continues to be problematic on the current regimen    -----------------------------------  Mayco Strickland DO, MSc  Anesthesia Chief Resident

## 2020-06-18 ENCOUNTER — APPOINTMENT (OUTPATIENT)
Dept: PHYSICAL THERAPY | Facility: CLINIC | Age: 60
End: 2020-06-18
Payer: COMMERCIAL

## 2020-06-18 LAB
ANION GAP SERPL CALCULATED.3IONS-SCNC: 5 MMOL/L (ref 3–14)
BACTERIA SPEC CULT: NO GROWTH
BUN SERPL-MCNC: 46 MG/DL (ref 7–30)
CALCIUM SERPL-MCNC: 8.6 MG/DL (ref 8.5–10.1)
CHLORIDE SERPL-SCNC: 117 MMOL/L (ref 94–109)
CO2 SERPL-SCNC: 23 MMOL/L (ref 20–32)
CREAT SERPL-MCNC: 1.42 MG/DL (ref 0.66–1.25)
ERYTHROCYTE [DISTWIDTH] IN BLOOD BY AUTOMATED COUNT: 27.2 % (ref 10–15)
GFR SERPL CREATININE-BSD FRML MDRD: 53 ML/MIN/{1.73_M2}
GLUCOSE BLDC GLUCOMTR-MCNC: 110 MG/DL (ref 70–99)
GLUCOSE BLDC GLUCOMTR-MCNC: 113 MG/DL (ref 70–99)
GLUCOSE BLDC GLUCOMTR-MCNC: 128 MG/DL (ref 70–99)
GLUCOSE BLDC GLUCOMTR-MCNC: 130 MG/DL (ref 70–99)
GLUCOSE BLDC GLUCOMTR-MCNC: 145 MG/DL (ref 70–99)
GLUCOSE SERPL-MCNC: 136 MG/DL (ref 70–99)
HCT VFR BLD AUTO: 28 % (ref 40–53)
HGB BLD-MCNC: 8.4 G/DL (ref 13.3–17.7)
MAGNESIUM SERPL-MCNC: 2.3 MG/DL (ref 1.6–2.3)
MCH RBC QN AUTO: 31.6 PG (ref 26.5–33)
MCHC RBC AUTO-ENTMCNC: 30 G/DL (ref 31.5–36.5)
MCV RBC AUTO: 105 FL (ref 78–100)
PHOSPHATE SERPL-MCNC: 2 MG/DL (ref 2.5–4.5)
PHOSPHATE SERPL-MCNC: 2.6 MG/DL (ref 2.5–4.5)
PLATELET # BLD AUTO: 93 10E9/L (ref 150–450)
POTASSIUM SERPL-SCNC: 4.4 MMOL/L (ref 3.4–5.3)
RBC # BLD AUTO: 2.66 10E12/L (ref 4.4–5.9)
SODIUM SERPL-SCNC: 145 MMOL/L (ref 133–144)
SPECIMEN SOURCE: NORMAL
WBC # BLD AUTO: 14.2 10E9/L (ref 4–11)

## 2020-06-18 PROCEDURE — 83735 ASSAY OF MAGNESIUM: CPT | Performed by: ANESTHESIOLOGY

## 2020-06-18 PROCEDURE — 25000128 H RX IP 250 OP 636: Performed by: STUDENT IN AN ORGANIZED HEALTH CARE EDUCATION/TRAINING PROGRAM

## 2020-06-18 PROCEDURE — 27210437 ZZH NUTRITION PRODUCT SEMIELEM INTERMED LITER

## 2020-06-18 PROCEDURE — 25000132 ZZH RX MED GY IP 250 OP 250 PS 637: Performed by: STUDENT IN AN ORGANIZED HEALTH CARE EDUCATION/TRAINING PROGRAM

## 2020-06-18 PROCEDURE — 25000128 H RX IP 250 OP 636: Performed by: SURGERY

## 2020-06-18 PROCEDURE — 94640 AIRWAY INHALATION TREATMENT: CPT

## 2020-06-18 PROCEDURE — 85027 COMPLETE CBC AUTOMATED: CPT | Performed by: ANESTHESIOLOGY

## 2020-06-18 PROCEDURE — 25800030 ZZH RX IP 258 OP 636: Performed by: STUDENT IN AN ORGANIZED HEALTH CARE EDUCATION/TRAINING PROGRAM

## 2020-06-18 PROCEDURE — 25000132 ZZH RX MED GY IP 250 OP 250 PS 637: Performed by: SURGERY

## 2020-06-18 PROCEDURE — 97530 THERAPEUTIC ACTIVITIES: CPT | Mod: GP

## 2020-06-18 PROCEDURE — 25000125 ZZHC RX 250: Performed by: STUDENT IN AN ORGANIZED HEALTH CARE EDUCATION/TRAINING PROGRAM

## 2020-06-18 PROCEDURE — 84100 ASSAY OF PHOSPHORUS: CPT | Performed by: ANESTHESIOLOGY

## 2020-06-18 PROCEDURE — 99233 SBSQ HOSP IP/OBS HIGH 50: CPT | Mod: GC | Performed by: SURGERY

## 2020-06-18 PROCEDURE — 00000146 ZZHCL STATISTIC GLUCOSE BY METER IP

## 2020-06-18 PROCEDURE — 84100 ASSAY OF PHOSPHORUS: CPT | Performed by: STUDENT IN AN ORGANIZED HEALTH CARE EDUCATION/TRAINING PROGRAM

## 2020-06-18 PROCEDURE — 20000004 ZZH R&B ICU UMMC

## 2020-06-18 PROCEDURE — 80048 BASIC METABOLIC PNL TOTAL CA: CPT | Performed by: ANESTHESIOLOGY

## 2020-06-18 RX ORDER — SPIRONOLACTONE 25 MG/1
50 TABLET ORAL DAILY
Status: DISCONTINUED | OUTPATIENT
Start: 2020-06-18 | End: 2020-06-25 | Stop reason: HOSPADM

## 2020-06-18 RX ADMIN — ALBUTEROL SULFATE 2.5 MG: 2.5 SOLUTION RESPIRATORY (INHALATION) at 07:54

## 2020-06-18 RX ADMIN — Medication 5000 UNITS: at 08:55

## 2020-06-18 RX ADMIN — POTASSIUM PHOSPHATE, MONOBASIC 500 MG: 500 TABLET, SOLUBLE ORAL at 21:00

## 2020-06-18 RX ADMIN — Medication 40 MG: at 08:55

## 2020-06-18 RX ADMIN — Medication 40 MG: at 16:29

## 2020-06-18 RX ADMIN — RIFAXIMIN 550 MG: 550 TABLET ORAL at 08:55

## 2020-06-18 RX ADMIN — MULTIVITAMIN 15 ML: LIQUID ORAL at 08:54

## 2020-06-18 RX ADMIN — PROPRANOLOL HYDROCHLORIDE 20 MG: 10 TABLET ORAL at 20:48

## 2020-06-18 RX ADMIN — ACETAMINOPHEN 975 MG: 325 TABLET, FILM COATED ORAL at 08:55

## 2020-06-18 RX ADMIN — Medication 5 MG: at 10:20

## 2020-06-18 RX ADMIN — FUROSEMIDE 20 MG: 20 TABLET ORAL at 08:55

## 2020-06-18 RX ADMIN — Medication 1 PACKET: at 08:55

## 2020-06-18 RX ADMIN — ATORVASTATIN CALCIUM 20 MG: 20 TABLET, FILM COATED ORAL at 08:55

## 2020-06-18 RX ADMIN — FOLIC ACID 1 MG: 1 TABLET ORAL at 08:54

## 2020-06-18 RX ADMIN — ASPIRIN 325 MG ORAL TABLET 325 MG: 325 PILL ORAL at 08:54

## 2020-06-18 RX ADMIN — Medication 1 PACKET: at 20:48

## 2020-06-18 RX ADMIN — THIAMINE HCL TAB 100 MG 100 MG: 100 TAB at 13:56

## 2020-06-18 RX ADMIN — THIAMINE HCL TAB 100 MG 100 MG: 100 TAB at 20:47

## 2020-06-18 RX ADMIN — CEFEPIME 2 G: 2 INJECTION, POWDER, FOR SOLUTION INTRAVENOUS at 03:46

## 2020-06-18 RX ADMIN — POTASSIUM PHOSPHATE, MONOBASIC AND POTASSIUM PHOSPHATE, DIBASIC 15 MMOL: 224; 236 INJECTION, SOLUTION INTRAVENOUS at 06:15

## 2020-06-18 RX ADMIN — MONTELUKAST 10 MG: 10 TABLET, FILM COATED ORAL at 21:00

## 2020-06-18 RX ADMIN — POTASSIUM & SODIUM PHOSPHATES POWDER PACK 280-160-250 MG 1 PACKET: 280-160-250 PACK at 08:57

## 2020-06-18 RX ADMIN — CLONIDINE HYDROCHLORIDE 0.1 MG: 0.1 TABLET ORAL at 08:55

## 2020-06-18 RX ADMIN — SPIRONOLACTONE 50 MG: 25 TABLET ORAL at 12:43

## 2020-06-18 RX ADMIN — PROPRANOLOL HYDROCHLORIDE 20 MG: 10 TABLET ORAL at 09:00

## 2020-06-18 RX ADMIN — LORAZEPAM 1 MG: 1 TABLET ORAL at 06:41

## 2020-06-18 RX ADMIN — Medication 1 PACKET: at 13:56

## 2020-06-18 RX ADMIN — LORAZEPAM 2 MG: 2 INJECTION INTRAMUSCULAR; INTRAVENOUS at 15:39

## 2020-06-18 RX ADMIN — Medication 5000 UNITS: at 15:41

## 2020-06-18 RX ADMIN — GABAPENTIN 600 MG: 300 CAPSULE ORAL at 15:41

## 2020-06-18 RX ADMIN — LORAZEPAM 2 MG: 2 INJECTION INTRAMUSCULAR; INTRAVENOUS at 12:26

## 2020-06-18 RX ADMIN — Medication 5 MG: at 16:29

## 2020-06-18 RX ADMIN — RIFAXIMIN 550 MG: 550 TABLET ORAL at 20:45

## 2020-06-18 RX ADMIN — CLONIDINE HYDROCHLORIDE 0.1 MG: 0.1 TABLET ORAL at 15:41

## 2020-06-18 RX ADMIN — THIAMINE HCL TAB 100 MG 100 MG: 100 TAB at 08:55

## 2020-06-18 RX ADMIN — INSULIN ASPART 1 UNITS: 100 INJECTION, SOLUTION INTRAVENOUS; SUBCUTANEOUS at 20:48

## 2020-06-18 RX ADMIN — POTASSIUM PHOSPHATE, MONOBASIC 500 MG: 500 TABLET, SOLUBLE ORAL at 12:43

## 2020-06-18 RX ADMIN — OXYCODONE HYDROCHLORIDE 5 MG: 5 TABLET ORAL at 05:04

## 2020-06-18 RX ADMIN — CEFEPIME 2 G: 2 INJECTION, POWDER, FOR SOLUTION INTRAVENOUS at 16:29

## 2020-06-18 RX ADMIN — FLUTICASONE FUROATE AND VILANTEROL TRIFENATATE 1 PUFF: 200; 25 POWDER RESPIRATORY (INHALATION) at 08:55

## 2020-06-18 RX ADMIN — ACETAMINOPHEN 975 MG: 325 TABLET, FILM COATED ORAL at 15:41

## 2020-06-18 RX ADMIN — GABAPENTIN 600 MG: 300 CAPSULE ORAL at 08:55

## 2020-06-18 RX ADMIN — POTASSIUM PHOSPHATE, MONOBASIC 500 MG: 500 TABLET, SOLUBLE ORAL at 18:29

## 2020-06-18 ASSESSMENT — ACTIVITIES OF DAILY LIVING (ADL)
ADLS_ACUITY_SCORE: 36
ADLS_ACUITY_SCORE: 40
ADLS_ACUITY_SCORE: 36

## 2020-06-18 ASSESSMENT — PAIN DESCRIPTION - DESCRIPTORS: DESCRIPTORS: ACHING;THROBBING

## 2020-06-18 NOTE — PLAN OF CARE
"D:Abhijeet appears to breath more easily after pain medication, he is speaking less clearly afterwards, he does tolerate turns better.     Neuro: Alert/drowsy, oriented to person, hospital. SHOEMAKER  purposefully , UE stronger than LE, all warm/normal pulses/denied numbness/tingling. Pupils  4/brisk/ said yes to question if he could see normally.   VS: Afeb, HR 78-92, RR 13-43, BP / 57-88 maps .   CV: SR no ectopy.   Pulm:  3L NC, LS  expiratory wheezes throughout, crackles bases. Strong cough. 1930 desat , face mask placed 12 L, improved, pulmonary toilet, cough encouraged, large thick wad tan/blood streaked particulate expectorated. IS used to 300, 500, Acapella used on low/med.   GI: BS +, loose stool via rectal tube, leaked around tubing.   : Ga by urology with good output clear susan urine.    Lines/Gtts:  R piv x 2, R internal jugular TL , TKO med line . L arterial line with poor waveform , no blood return at 0400, removed.   Skin:   L hip incision liquid dressing, ecchymotic, open to air. Urinary meatus red/pink, purulent drainage, cleansed, barrier cream, gauze over loosely.   Drains: ga   Labs: see labs , 15 mmol kphos pending.   P: Maintain safety, as Abhijeet becomes stronger, he is attempting to \"get out of here.\", refusing more cares, swearing more. Maintain hip safety , pillow support, not crossing legs, medicate for pain, reorient and reassure. Continue pulmonary toilet, IS/acapella, monitor for mucous plugs. discontinue ga when ordered.      "

## 2020-06-18 NOTE — PROGRESS NOTES
Social Work: Assessment with Discharge Plan    Patient Name:  Abhijeet Mccauley  :  1960  Age:  59 year old  MRN:  [id number]  Risk/Complexity Score:  Filed Complexity Screen Score: 13  Completed assessment with:  Patient's sister, chart review    Presenting Information   Reason for Referral:  Discharge plan  Date of Intake:  2020  Referral Source:  Chart Review  Decision Maker:  Patient at baseline  Alternate Decision Maker:  Mother per [facility] policy  Health Care Directive:  Patient not able to complete at this time. Sister is not aware of one.  Living Situation:  Patient lives in sister's modified garage  Previous Functional Status:  Assistance with Meals:  Meals on Wheels. Patient has a CADI waiver and also has homemaking services  Patient and family understanding of hospitalization:  Appropriate.  Cultural/Language/Spiritual Considerations:  None identified  Adjustment to Illness:  Unable to assess    Physical Health  Reason for Admission:    1. Sepsis with acute renal failure and septic shock, due to unspecified organism, unspecified acute renal failure type (H)    2. Alcohol withdrawal syndrome with complication (H)    3. Sepsis with acute renal failure and septic shock, due to unspecified organism, unspecified acute renal failure type (H)      Services Needed/Recommended:  TCU    Mental Health/Chemical Dependency  Diagnosis:  Alcohol use disorder  Support/Services in Place:  None currently  Services Needed/Recommended:  TBD    Support System  Significant relationship at present time:  Sister  Family of origin is available for support:  Yes  Other support available:  Brother-in-law, CADI worker  Gaps in support system:  None identified  Patient is caregiver to:  None     Provider Information   Primary Care Physician:  Chidi Valenzuela   [phone]   Clinic:  [address] [place] [id number]      :  [facility]  Miranda / [phone]    Financial   Income  Source:  SSDI  Financial Concerns:  None identified  Insurance:    Payor/Plan Subscriber Name Rel Member # Group #   UCARE - [employer]* ALVA MARISCAL  [id number] [facility] [id number]       Discharge Plan   Patient and family discharge goal:  TCU  Provided education on discharge plan:  YES  Patient agreeable to discharge plan:  TBD. Patient's sister said she feels strongly he would benefit from TCU, but he may disagree when more alert.  A list of Medicare Certified Facilities was provided to the patient and/or family to encourage patient choice. Patient's choices for facility are:  Pt/family was directed to the Medicare Compare list for SNF.  Discussed associated Medicare star ratings to assist with choice for referrals/discharge planning Yes    Education was given to pt/family that star ratings are updated/maintained by Medicare and can be reviewed by visiting www.medicare.gov Yes    Sister voiced preference for [facility] [address] when patient is ready.    Will NH provide Skilled rehabilitation or complex medical:  YES  General information regarding anticipated insurance coverage and possible out of pocket cost was discussed. Patient and patient's family are aware patient may incur the cost of transportation to the facility, pending insurance payment: YES  Barriers to discharge:  Medical readiness, bed availability    Discharge Recommendations   Anticipated Disposition:  Facility:  TCU TBD  Transportation Needs:  Other:  Pending patient needs at discharge  Name of Transportation Company and Phone:  N/A    Additional comments   SW completed assessment with pt's sister. No immediate acute psychosocial concerns identified at this time. SW will continue to remain available for patient and family support, discharge planning, other resources and support PRN.    AUTUMN [place], [facility]  ICU    [facility]   P: [phone]  Pager: [phone]

## 2020-06-18 NOTE — PROGRESS NOTES
"Brief trauma note  Brief HPI  59 year old male who underwent left hip replacement 05/29/2020, who was found down in his garage by a home nurse. He was transferred to [facility] from [facility] after he became febrile, had multiple seizures, became less response, CIWA 26, subsequently intubated.   A CT head, C Spine, CAP were obtained which indicated multiple posterior left rib fractures. The was concern for urosepsis due to an obstructing ureteral stone, and EtOH withdrawal. He was admitted to the SICU for further management.     Urology was consulted for possible obstructing left ureteral stone and he underwent a left retrograde pyelogram on 06/13/2020 with a left ureteral stent.   He was extubated 06/16.      Interval history  Delirious, restless, asked for a couple of beers and wants to \"get out of here\"     Traumatic injuries:   Right sided rib fractures 3-8th with anterior and posterior components  - Management for rib fractures include: pain control, Aggressive pulmonary hygiene: IS, Flutter valve, early mobility  - Discussed option for regional neuroaxial blockade with [facility] 06/17, not an ideal candidate due to coagulopathy 2/2 liver disease. Details in their note dated 06/17  Current pain regimen: Lidoderm patches and Menthol patches when Lido patches come off, scheduled Tylenol, Oxycodone.   - Would benefit from a Medicine consult or transfer to medicine service when able to leave the ICU.     PT/OT consults  Exam/Vitals:  Vitals:    06/18/20 0400 06/18/20 0500 06/18/20 0600 06/18/20 0800   BP: (!) 147/82 (!) 141/75 123/74    BP Location: Right arm      Pulse: 89 86 81    Resp: 21 (!) 31 12    Temp:    98.7  F (37.1  C)   TempSrc:    Axillary   SpO2: 97% 96% 97%    Weight:         Lab Results   Component Value Date    WBC 14.2 (H) 06/18/2020    HGB 8.4 (L) 06/18/2020    HCT 28.0 (L) 06/18/2020    PLT 93 (L) 06/18/2020     (H) 06/18/2020    POTASSIUM 4.4 06/18/2020    CHLORIDE 117 (H) 06/18/2020 "    CO2 23 06/18/2020    BUN 46 (H) 06/18/2020    CR 1.42 (H) 06/18/2020     (H) 06/18/2020    SED 73 (H) 03/15/2019    DD 0.9 (H) 07/25/2008    NTBNPI 1,103 (H) 03/18/2018    NTBNP 480 (H) 12/20/2018    TROPI <0.015 06/12/2020    AST 42 06/17/2020    ALT 22 06/17/2020    ALKPHOS 561 (H) 06/17/2020    BILITOTAL 1.7 (H) 06/17/2020    STEW 15 06/16/2020    INR 1.64 (H) 06/12/2020     Physical exam  Neuro: Oriented to self, moves all extremities  Cardiac: S1S2  Resp: Coarse, no wheezes, good cough effort  GI: Rounded and soft, +BS  : urine per ga, susan  MSK: Moves all joints spont     Afanwi Kube CNP  Trauma

## 2020-06-18 NOTE — PLAN OF CARE
Major Shift Events:  Pt was increasingly confused starting around 1200, scoring 13 on CIWAA, PRN ativan given 2x (2mg each) with improved CIWAA. Pt oriented to self and intermittently location or time. Refuses to follow all commands. TF at goal rate. Pt up to chair via PT (Aof2). Leaks around rectal tube intermittently. Good UOP, keep in place per SICU team d/t decreased level of consciousness/ worsening disorientation.     Plan: Monitor orientation and level of consciousness, CIWAA scale.    For vital signs and complete assessments, please see documentation flowsheets. Notify MD of any changes.

## 2020-06-19 ENCOUNTER — APPOINTMENT (OUTPATIENT)
Dept: PHYSICAL THERAPY | Facility: CLINIC | Age: 60
End: 2020-06-19
Payer: COMMERCIAL

## 2020-06-19 LAB
ABO + RH BLD: ABNORMAL
ABO + RH BLD: ABNORMAL
AMMONIA PLAS-SCNC: 15 UMOL/L (ref 10–50)
ANION GAP SERPL CALCULATED.3IONS-SCNC: 3 MMOL/L (ref 3–14)
BLD GP AB SCN SERPL QL: ABNORMAL
BLOOD BANK CMNT PATIENT-IMP: ABNORMAL
BLOOD BANK CMNT PATIENT-IMP: ABNORMAL
BUN SERPL-MCNC: 52 MG/DL (ref 7–30)
CALCIUM SERPL-MCNC: 8.7 MG/DL (ref 8.5–10.1)
CHLORIDE SERPL-SCNC: 118 MMOL/L (ref 94–109)
CO2 SERPL-SCNC: 24 MMOL/L (ref 20–32)
CREAT SERPL-MCNC: 1.38 MG/DL (ref 0.66–1.25)
ERYTHROCYTE [DISTWIDTH] IN BLOOD BY AUTOMATED COUNT: ABNORMAL % (ref 10–15)
GFR SERPL CREATININE-BSD FRML MDRD: 55 ML/MIN/{1.73_M2}
GLUCOSE BLDC GLUCOMTR-MCNC: 111 MG/DL (ref 70–99)
GLUCOSE BLDC GLUCOMTR-MCNC: 127 MG/DL (ref 70–99)
GLUCOSE BLDC GLUCOMTR-MCNC: 129 MG/DL (ref 70–99)
GLUCOSE BLDC GLUCOMTR-MCNC: 130 MG/DL (ref 70–99)
GLUCOSE BLDC GLUCOMTR-MCNC: 136 MG/DL (ref 70–99)
GLUCOSE BLDC GLUCOMTR-MCNC: 142 MG/DL (ref 70–99)
GLUCOSE SERPL-MCNC: 142 MG/DL (ref 70–99)
HCT VFR BLD AUTO: 24.5 % (ref 40–53)
HGB BLD-MCNC: 7.3 G/DL (ref 13.3–17.7)
MAGNESIUM SERPL-MCNC: 2.1 MG/DL (ref 1.6–2.3)
MCH RBC QN AUTO: 31.6 PG (ref 26.5–33)
MCHC RBC AUTO-ENTMCNC: 29.8 G/DL (ref 31.5–36.5)
MCV RBC AUTO: 106 FL (ref 78–100)
PHOSPHATE SERPL-MCNC: 2.2 MG/DL (ref 2.5–4.5)
PLATELET # BLD AUTO: 100 10E9/L (ref 150–450)
POTASSIUM SERPL-SCNC: 4.9 MMOL/L (ref 3.4–5.3)
RBC # BLD AUTO: 2.31 10E12/L (ref 4.4–5.9)
SODIUM SERPL-SCNC: 145 MMOL/L (ref 133–144)
SPECIMEN EXP DATE BLD: ABNORMAL
WBC # BLD AUTO: 14.7 10E9/L (ref 4–11)

## 2020-06-19 PROCEDURE — 00000146 ZZHCL STATISTIC GLUCOSE BY METER IP

## 2020-06-19 PROCEDURE — 97530 THERAPEUTIC ACTIVITIES: CPT | Mod: GP

## 2020-06-19 PROCEDURE — 25000132 ZZH RX MED GY IP 250 OP 250 PS 637: Performed by: STUDENT IN AN ORGANIZED HEALTH CARE EDUCATION/TRAINING PROGRAM

## 2020-06-19 PROCEDURE — 86901 BLOOD TYPING SEROLOGIC RH(D): CPT | Performed by: STUDENT IN AN ORGANIZED HEALTH CARE EDUCATION/TRAINING PROGRAM

## 2020-06-19 PROCEDURE — 25000132 ZZH RX MED GY IP 250 OP 250 PS 637: Performed by: SURGERY

## 2020-06-19 PROCEDURE — 80048 BASIC METABOLIC PNL TOTAL CA: CPT | Performed by: ANESTHESIOLOGY

## 2020-06-19 PROCEDURE — 27210437 ZZH NUTRITION PRODUCT SEMIELEM INTERMED LITER

## 2020-06-19 PROCEDURE — 40000275 ZZH STATISTIC RCP TIME EA 10 MIN

## 2020-06-19 PROCEDURE — 25000125 ZZHC RX 250: Performed by: STUDENT IN AN ORGANIZED HEALTH CARE EDUCATION/TRAINING PROGRAM

## 2020-06-19 PROCEDURE — 25000128 H RX IP 250 OP 636: Performed by: STUDENT IN AN ORGANIZED HEALTH CARE EDUCATION/TRAINING PROGRAM

## 2020-06-19 PROCEDURE — 20000004 ZZH R&B ICU UMMC

## 2020-06-19 PROCEDURE — 94640 AIRWAY INHALATION TREATMENT: CPT

## 2020-06-19 PROCEDURE — 82140 ASSAY OF AMMONIA: CPT | Performed by: STUDENT IN AN ORGANIZED HEALTH CARE EDUCATION/TRAINING PROGRAM

## 2020-06-19 PROCEDURE — 86900 BLOOD TYPING SEROLOGIC ABO: CPT | Performed by: STUDENT IN AN ORGANIZED HEALTH CARE EDUCATION/TRAINING PROGRAM

## 2020-06-19 PROCEDURE — 25800030 ZZH RX IP 258 OP 636: Performed by: STUDENT IN AN ORGANIZED HEALTH CARE EDUCATION/TRAINING PROGRAM

## 2020-06-19 PROCEDURE — 84100 ASSAY OF PHOSPHORUS: CPT | Performed by: ANESTHESIOLOGY

## 2020-06-19 PROCEDURE — G0463 HOSPITAL OUTPT CLINIC VISIT: HCPCS | Mod: 25

## 2020-06-19 PROCEDURE — 86780 TREPONEMA PALLIDUM: CPT | Performed by: STUDENT IN AN ORGANIZED HEALTH CARE EDUCATION/TRAINING PROGRAM

## 2020-06-19 PROCEDURE — 97602 WOUND(S) CARE NON-SELECTIVE: CPT

## 2020-06-19 PROCEDURE — 86850 RBC ANTIBODY SCREEN: CPT | Performed by: STUDENT IN AN ORGANIZED HEALTH CARE EDUCATION/TRAINING PROGRAM

## 2020-06-19 PROCEDURE — 85027 COMPLETE CBC AUTOMATED: CPT | Performed by: ANESTHESIOLOGY

## 2020-06-19 PROCEDURE — 83735 ASSAY OF MAGNESIUM: CPT | Performed by: ANESTHESIOLOGY

## 2020-06-19 RX ORDER — ZINC OXIDE
OINTMENT (GRAM) TOPICAL
Status: DISCONTINUED | OUTPATIENT
Start: 2020-06-19 | End: 2020-06-20

## 2020-06-19 RX ORDER — GABAPENTIN 300 MG/1
600 CAPSULE ORAL 3 TIMES DAILY
Status: DISCONTINUED | OUTPATIENT
Start: 2020-06-19 | End: 2020-06-25 | Stop reason: HOSPADM

## 2020-06-19 RX ORDER — ASPIRIN 325 MG
81 TABLET ORAL DAILY
Status: DISCONTINUED | OUTPATIENT
Start: 2020-06-20 | End: 2020-06-19

## 2020-06-19 RX ORDER — BUPROPION HYDROCHLORIDE 100 MG/1
100 TABLET ORAL EVERY 8 HOURS
Status: DISCONTINUED | OUTPATIENT
Start: 2020-06-19 | End: 2020-06-20

## 2020-06-19 RX ORDER — ACETAMINOPHEN 325 MG/1
650 TABLET ORAL EVERY 8 HOURS
Status: DISCONTINUED | OUTPATIENT
Start: 2020-06-20 | End: 2020-06-25 | Stop reason: HOSPADM

## 2020-06-19 RX ORDER — GABAPENTIN 100 MG/1
100 CAPSULE ORAL EVERY 8 HOURS
Status: DISCONTINUED | OUTPATIENT
Start: 2020-06-22 | End: 2020-06-19

## 2020-06-19 RX ORDER — ASPIRIN 81 MG/1
81 TABLET, CHEWABLE ORAL DAILY
Status: DISCONTINUED | OUTPATIENT
Start: 2020-06-20 | End: 2020-06-25 | Stop reason: HOSPADM

## 2020-06-19 RX ORDER — GABAPENTIN 300 MG/1
600 CAPSULE ORAL EVERY 8 HOURS
Status: DISCONTINUED | OUTPATIENT
Start: 2020-06-20 | End: 2020-06-19

## 2020-06-19 RX ORDER — BACLOFEN 10 MG/1
5 TABLET ORAL 3 TIMES DAILY
Status: DISCONTINUED | OUTPATIENT
Start: 2020-06-19 | End: 2020-06-25 | Stop reason: HOSPADM

## 2020-06-19 RX ORDER — FUROSEMIDE 10 MG/ML
40 INJECTION INTRAMUSCULAR; INTRAVENOUS EVERY 12 HOURS
Status: DISCONTINUED | OUTPATIENT
Start: 2020-06-19 | End: 2020-06-21

## 2020-06-19 RX ORDER — MICONAZOLE NITRATE 20 MG/G
CREAM TOPICAL 2 TIMES DAILY
Status: DISCONTINUED | OUTPATIENT
Start: 2020-06-19 | End: 2020-06-20

## 2020-06-19 RX ORDER — GABAPENTIN 300 MG/1
300 CAPSULE ORAL EVERY 8 HOURS
Status: DISCONTINUED | OUTPATIENT
Start: 2020-06-20 | End: 2020-06-19

## 2020-06-19 RX ORDER — BUPROPION HYDROCHLORIDE 150 MG/1
150 TABLET, EXTENDED RELEASE ORAL 2 TIMES DAILY
Status: DISCONTINUED | OUTPATIENT
Start: 2020-06-19 | End: 2020-06-19

## 2020-06-19 RX ADMIN — THIAMINE HCL TAB 100 MG 100 MG: 100 TAB at 20:42

## 2020-06-19 RX ADMIN — GABAPENTIN 600 MG: 300 CAPSULE ORAL at 21:33

## 2020-06-19 RX ADMIN — CLONIDINE HYDROCHLORIDE 0.1 MG: 0.1 TABLET ORAL at 08:20

## 2020-06-19 RX ADMIN — INSULIN ASPART 1 UNITS: 100 INJECTION, SOLUTION INTRAVENOUS; SUBCUTANEOUS at 16:49

## 2020-06-19 RX ADMIN — ACETAMINOPHEN 975 MG: 325 TABLET, FILM COATED ORAL at 00:48

## 2020-06-19 RX ADMIN — Medication 5000 UNITS: at 00:48

## 2020-06-19 RX ADMIN — CEFEPIME 2 G: 2 INJECTION, POWDER, FOR SOLUTION INTRAVENOUS at 04:41

## 2020-06-19 RX ADMIN — THIAMINE HCL TAB 100 MG 100 MG: 100 TAB at 14:34

## 2020-06-19 RX ADMIN — SPIRONOLACTONE 50 MG: 25 TABLET ORAL at 08:19

## 2020-06-19 RX ADMIN — ACETAMINOPHEN 975 MG: 325 TABLET, FILM COATED ORAL at 08:20

## 2020-06-19 RX ADMIN — GABAPENTIN 600 MG: 300 CAPSULE ORAL at 00:48

## 2020-06-19 RX ADMIN — Medication 5 MG: at 21:30

## 2020-06-19 RX ADMIN — MULTIVITAMIN 15 ML: LIQUID ORAL at 08:19

## 2020-06-19 RX ADMIN — FLUTICASONE FUROATE AND VILANTEROL TRIFENATATE 1 PUFF: 200; 25 POWDER RESPIRATORY (INHALATION) at 08:32

## 2020-06-19 RX ADMIN — Medication 5000 UNITS: at 11:01

## 2020-06-19 RX ADMIN — Medication 5 MG: at 08:32

## 2020-06-19 RX ADMIN — MONTELUKAST 10 MG: 10 TABLET, FILM COATED ORAL at 21:32

## 2020-06-19 RX ADMIN — ALBUTEROL SULFATE 2.5 MG: 2.5 SOLUTION RESPIRATORY (INHALATION) at 00:33

## 2020-06-19 RX ADMIN — DIBASIC SODIUM PHOSPHATE, MONOBASIC POTASSIUM PHOSPHATE AND MONOBASIC SODIUM PHOSPHATE 500 MG: 852; 155; 130 TABLET ORAL at 10:04

## 2020-06-19 RX ADMIN — BUPROPION HYDROCHLORIDE 150 MG: 150 TABLET, EXTENDED RELEASE ORAL at 10:04

## 2020-06-19 RX ADMIN — SODIUM PHOSPHATE, MONOBASIC, MONOHYDRATE AND SODIUM PHOSPHATE, DIBASIC, ANHYDROUS 15 MMOL: 276; 142 INJECTION, SOLUTION INTRAVENOUS at 14:47

## 2020-06-19 RX ADMIN — Medication 1 PACKET: at 08:32

## 2020-06-19 RX ADMIN — Medication 40 MG: at 16:58

## 2020-06-19 RX ADMIN — DIBASIC SODIUM PHOSPHATE, MONOBASIC POTASSIUM PHOSPHATE AND MONOBASIC SODIUM PHOSPHATE 500 MG: 852; 155; 130 TABLET ORAL at 20:17

## 2020-06-19 RX ADMIN — BUPROPION HYDROCHLORIDE 100 MG: 100 TABLET, FILM COATED ORAL at 21:31

## 2020-06-19 RX ADMIN — FUROSEMIDE 40 MG: 10 INJECTION, SOLUTION INTRAMUSCULAR; INTRAVENOUS at 21:33

## 2020-06-19 RX ADMIN — Medication 5 MG: at 16:58

## 2020-06-19 RX ADMIN — GABAPENTIN 600 MG: 300 CAPSULE ORAL at 16:58

## 2020-06-19 RX ADMIN — CLONIDINE HYDROCHLORIDE 0.1 MG: 0.1 TABLET ORAL at 00:48

## 2020-06-19 RX ADMIN — PROPRANOLOL HYDROCHLORIDE 20 MG: 10 TABLET ORAL at 20:42

## 2020-06-19 RX ADMIN — MICONAZOLE NITRATE: 20 CREAM TOPICAL at 20:43

## 2020-06-19 RX ADMIN — PROPRANOLOL HYDROCHLORIDE 20 MG: 10 TABLET ORAL at 09:00

## 2020-06-19 RX ADMIN — ATORVASTATIN CALCIUM 20 MG: 20 TABLET, FILM COATED ORAL at 08:20

## 2020-06-19 RX ADMIN — THIAMINE HCL TAB 100 MG 100 MG: 100 TAB at 08:20

## 2020-06-19 RX ADMIN — RIFAXIMIN 550 MG: 550 TABLET ORAL at 08:20

## 2020-06-19 RX ADMIN — CLONIDINE HYDROCHLORIDE 0.1 MG: 0.1 TABLET ORAL at 16:58

## 2020-06-19 RX ADMIN — NICOTINE 1 PATCH: 14 PATCH, EXTENDED RELEASE TRANSDERMAL at 08:32

## 2020-06-19 RX ADMIN — RIFAXIMIN 550 MG: 550 TABLET ORAL at 20:42

## 2020-06-19 RX ADMIN — Medication 1 PACKET: at 20:44

## 2020-06-19 RX ADMIN — Medication 5 MG: at 00:48

## 2020-06-19 RX ADMIN — FUROSEMIDE 40 MG: 10 INJECTION, SOLUTION INTRAMUSCULAR; INTRAVENOUS at 10:39

## 2020-06-19 RX ADMIN — Medication 5000 UNITS: at 18:08

## 2020-06-19 RX ADMIN — CEFEPIME 2 G: 2 INJECTION, POWDER, FOR SOLUTION INTRAVENOUS at 16:58

## 2020-06-19 RX ADMIN — Medication 40 MG: at 08:32

## 2020-06-19 RX ADMIN — ASPIRIN 325 MG ORAL TABLET 325 MG: 325 PILL ORAL at 08:19

## 2020-06-19 RX ADMIN — GABAPENTIN 600 MG: 300 CAPSULE ORAL at 08:20

## 2020-06-19 RX ADMIN — Medication 1 PACKET: at 14:34

## 2020-06-19 RX ADMIN — FOLIC ACID 1 MG: 1 TABLET ORAL at 08:20

## 2020-06-19 RX ADMIN — LIDOCAINE 1 PATCH: 560 PATCH PERCUTANEOUS; TOPICAL; TRANSDERMAL at 14:34

## 2020-06-19 RX ADMIN — ALBUTEROL SULFATE 2.5 MG: 2.5 SOLUTION RESPIRATORY (INHALATION) at 15:24

## 2020-06-19 ASSESSMENT — ACTIVITIES OF DAILY LIVING (ADL)
ADLS_ACUITY_SCORE: 36

## 2020-06-19 NOTE — PROGRESS NOTES
CLINICAL NUTRITION SERVICES - REASSESSMENT NOTE     Nutrition Prescription    RECOMMENDATIONS FOR MDs/PROVIDERS TO ORDER:  Additional free water flushes as per primary team.    Recommend SLP consult for formal swallow evaluation when appropriate.    NFPE available upon provider request.    Malnutrition Status:    Unable to determine at this time     Recommendations already ordered by Registered Dietitian (RD):  Continue TF as ordered and Nutrisource fiber    Future/Additional Recommendations:  Monitor TF tolerance, stool trends, weight trends, ability to advance diet vs ability to transition to standard TF formula pending CRP level.  -If standard formula is appropriate: Nutren 1.5 @ 50 mL/hr + 2 ProSource packets to provide 1880 kcals (27 kcal/kg/day), 104 g PRO (1.5 g/kg/day), 912 mL H2O, 211 g CHO and no fiber daily.       EVALUATION OF THE PROGRESS TOWARD GOALS   Diet: NPO  Nutrition Support: Impact Peptide @ goal 50 ml/hr (1200 ml/day) to provide 1800 kcals (26 kcal/kg/day), 113 g PRO (1.6 gm/kg/day), 924 ml free H2O, 77 g Fat (50% from MCTs), 168 g CHO and no Fiber daily.  Intake: Total daily average TF intake = 1239 kcal (72%, 18 g/kg), 78 g pro (87%, 1.1 g/kg)  TF reached goal rate @ 1400 on 6/16.     NEW FINDINGS   Pt remains confused, lethargic per notes. Possible transfer to the floor.     GI: diarrhea -> 9BMs 6/15, 3 BMs 6/16, 200-350 ml stool output 6/17-6/19  Labs: Na 145 (H), K+ 4.9 (high normal), BUN 52 (H <- 46 <- 36 <- 29), Cr 1.38 (H <- 1.42 <- 1.48 <- 1.69), Mg++ 2.1 (WNL), Phos 2.2 (L <- 2.6 <- 2.0 <- 2.5),  (H on 6/15)  Meds: Nutrisource fiber TID, folic acid (1 mg), lasix, low sliding scale insulin, certavite, phosphorus tablet (500 mg BID), rifaximin, spironolactone, thiamine (100 mg TID until 6/20 -> 100 mg daily)     MALNUTRITION  % Intake: < 75% for > 7 days (non-severe)  % Weight Loss: Unable to assess due to fluid shifts  Subcutaneous Fat Loss: Unable to assess  Muscle Loss: Unable  to assess  Fluid Accumulation/Edema: trace 1+ edema per flowsheet  Malnutrition Diagnosis: Unable to determine at this time     Previous Goals   Total avg nutritional intake to meet a minimum of 25 kcal/kg and 1.3 g PRO/kg daily (per dosing wt 69 kg).  Evaluation: Not met    Previous Nutrition Diagnosis  Inadequate oral intake related to inability to consume nutrition as evidenced by intubation/sedation with consult placed for enteral nutrition support    Evaluation: no longer appropriate, see new diagnosis below    CURRENT NUTRITION DIAGNOSIS  Inadequate oral intake related to lethargy, AMS as evidenced by NPO status x 7 days and need for EN to meet needs.       INTERVENTIONS  Implementation  Collaboration with other providers - SICU rounds  Enteral Nutrition - continue as ordered    Goals  Total avg nutritional intake to meet a minimum of 25 kcal/kg and 1.3 g PRO/kg daily (per dosing wt 69 kg).    Monitoring/Evaluation  Progress toward goals will be monitored and evaluated per protocol.    Leatha Jones, MS, RD, LD, [facility]  SICU: [phone] *[phone]

## 2020-06-19 NOTE — PLAN OF CARE
Discharge Planner PT   Patient plan for discharge: not discussed     Current status: Pt with inc confusion throughout session. Performed bed mobility and supine>sit with HOB elevated and mod a for trunk and LE management. STS with min-mod A x 2 and SPT bed>chair with mod A x 2. Performed additional STS from chair with FWW and min-mod A x 2.  AVSS on 1 L O2 NC, through pt wheezing throughout session.     Barriers to return to prior living situation: medical needs, current mobility, level of A    Recommendations for discharge: rehab     Rationale for recommendations: Pt is below baseline, he has interdisciplinary needs (OT to initiate 6/20), would benefit from ongoing therapy to continue to progress functional mobility toward baseline.        Entered by: Anjali Espinoza 06/19/2020 3:06 PM

## 2020-06-19 NOTE — PLAN OF CARE
Major Shift Events:  Patient is alert to himself entire shift, pupils are equal and reactive, does not track and intermittently follow commands, strength is bilaterally equal, has become more verbally aggressive and intermittently swats at nursing staff, has been NSR, has been on 2L via NC all night, has audible wheezes, did get on PRN of Albuterol, TF at goal, has rectal tube, ga having susan output.   Plan: Continue to monitor   For vital signs and complete assessments, please see documentation flowsheets.

## 2020-06-19 NOTE — PROGRESS NOTES
[facility], [place]    Progress Note - [facility] 1 Service        Date of Admission:  6/12/2020    Assessment & Plan   Abhijeet Mccauley is a 59 year old male with a history of alcohol use disorder, alcohol withdrawal seizures, EtOH cirrhosis, COPD, ulcerative colitis, CKD III, AML (diagnosed 2008, s/p chemotherapy), and s/p left hip replacement ~2 weeks prior to admission admitted on 6/12/2020 after multiple falls c/b right 3-8 rib fractures with concern for alcohol withdrawal and seizures.     Metabolic encephalopathy  Concern for seizures  Awake and alert, but still quite confused. Neurology consulted due to concern for seizures and altered mental status--they felt seizures were provoked by a combination of alcohol withdrawal, sepsis, and metabolic derangements. Thus, no anti-seizure medication was initiated. vEEG completed 6/14 without subclinical seizures. Encephalopathy most likely multifactorial in the setting of infection, withdrawal, and ICU delirium. There could also be an element of hepatic encephalopathy, particularly given asterixis on exam.   - Check ammonia - within normal limits    Acute hypoxic respiratory failure, improving  Intubated for seizures and altered mental status. Now extubated and weaned down to 1L O2. Thought to have some element of volume overload in the setting of cirrhosis, so lasix was increased today. Last CXR 6/14 with perihilar and basilar opacities, likely edema vs. atelectasis. Last echo in 2017 with EF 55-60%.   - Continue lasix 40 mg IV BID  - AM CXR  - Incentive spirometry    Alcohol withdrawal  Alcohol use disorder  - S/p PRN lorazepam, last dose 6/18  - Scheduled clonidine 0.1 mg Q8H  - Haldol 5 mg IV Q4H PRN for agitation  - Folic acid and thiamine supplementation  - S/p ativan protocol which was discontinued on 6/18    Genital lesions  See images under misc tab. Patient denies significant pain. Differential diagnosis includes STI (syphilis,  HSV, lymphogranuloma venereum, haemophilus ducreyi) vs wound from prior edema and possible component of candida/intertrigo.   - Treponema Abs w/ reflex to RPR and titer  - Gonorrhea and chlamydia swabs of lesion  - HSV swab of lesions  - [facility] nurse consult  - Miconazole cream BID  - Zinc oxide paste PRN as barrier cream    Urinary tract infection (enterobacter cloacae)  Left upper pole obstructing stone  CT abdomen showed hydronephrosis, + UA, was seen by Urology and had a cystogram and left retrograde pyelogram with placement of a left ureteral stent placement on 06/13 by Dr. Lino. Follow up CT showed good left ureteral stent position. Urine output adequate with ga in place, diuresing as well and white count down trending. Definitive stone treatment per urology once acute illness resolves.   - Continue cefepime 2g Q12H for a 14 day course through 6/26  - Patient has been scheduled for office visit with Dr. Hale in ~1 month, sister aware  - Please page urology prior to discharge to discuss plan with patient in person    Rib fractures  - Tylenol 975 mg Q8H--> decrease to 650 mg Q8H in the setting of cirrhosis  - Lidocaine patches  - Oxycodone 5 mg Q4H PRN  - Dilaudid IV 0.2-0.4 mg Q1H PRN  - Continue home baclofen 5 mg TID (reduced from home dose of 10 mg TID)  - Continue home gabapentin 600 mg TID  - PT and OT evaluations    Cirrhosis  Reported history of esophageal varices, but none documented on last EGD in February.  - Continue rifaximin BID  - Continue spironolactone 50 mg daily  - Continue propranalol    Acute kidney injury, resolved  Chronic kidney disease stage III  Likely secondary to septic shock. Unclear baseline in times of wellness, but appears to be ~1.7. Now improved to 1.18.   - Avoid nephrotoxins    Acute on chronic macrocytic anemia  Combination of critical illness, liver disease, alcohol use, and recent blood loss.    CHRONIC  S/p knee replacement- Per note from 5/30, recommended 81 mg  daily for 42 days (through 7/11)   COPD- continue breo ellipta, singulair, PRN albuterol  Hyperlipidemia- Continue home atorvastatin  Generalized anxiety disorder- Continue home bupropion; holding trazadone and hydroxyzine  Tobacco use- Continue nicotine patch  Gastritis- continue protonix BID     Diet: NPO for Medical/Clinical Reasons Except for: Meds  Adult Formula Drip Feeding: Continuous Impact Peptide 1.5; Nasoduodenal tube; Goal Rate: 50; mL/hr; Medication - Feeding Tube Flush Frequency: At least 15-30 mL water before and after medication administration and with tube clogging; Amount to Sen...    Fluids: None  Lines: internal jugular (asked to be pulled prior to transfer), PIV, rectal tube, NG  DVT Prophylaxis: Heparin SQ  Chong Catheter: in place, indication: Strict 1-2 Hour I&O, Strict 1-2 Hour I&O  Code Status: Full Code           Disposition Plan   Expected discharge: 2 - 3 days, recommended to pending PT/OT evaluation once safe disposition plan/ TCU bed available.  Entered: Flora Alejo MD 06/19/2020, 3:35 PM       This patient will be formally staffed in the AM.     Flora Alejo MD  [facility] 1 Service  [facility], [place]  Pager: [phone]  Please see sticky note for cross cover information  ______________________________________________________________________    Interval History   Transferred from the SICU team today. He'd very much like to go home. He feels somewhat short of breath. No chest pain, abdominal pain, nausea, or headache. Spoke with his sister as well, per his request.     Data reviewed today: I reviewed all medications, new labs and imaging results over the last 24 hours.     Physical Exam   Vital Signs: Temp: 98.7  F (37.1  C) Temp src: Axillary BP: 134/73 Pulse: 86 Heart Rate: 84 Resp: 26(Pt working with PT.) SpO2: 97 % O2 Device: Nasal cannula Oxygen Delivery: 1 LPM  Weight: 195 lbs 8.77 oz  Constitutional: Awake and alert.  Comfortable-appearing, sitting in chair.   Eyes: Pupils equal and reactive to light. No conjunctival injection or scleral icterus.   ENT: Scattered punctate erythematous lesions on lips.   Respiratory: Saturating well on 1L nasal cannula. Poor air movement. Scattered expiratory wheezes.   Cardiovascular: RRR. Distant heart sounds. Normal S1/S2. No murmurs appreciated.   GI: Distended. Soft. Nontender.   Genitounirinary: See penile lesion in media tab.   Musculoskeletal: No significant lower extremity edema.   Neurologic: Oriented to self only. Knows he's in a hospital, but thinks it's [facility]. Not oriented to date.   Psychiatric:  Tearful during conversation.     Data   Recent Labs   Lab 06/19/20  0433 06/18/20  0339 06/17/20  0358  06/13/20  0443  06/12/20  2110   WBC 14.7* 14.2* 17.8*   < > 19.1*  --  28.5*   HGB 7.3* 8.4* 8.3*   < > 8.7*  --  8.3*   * 105* 104*   < > 107*  --  108*   * 93* 89*   < > 129*  --  158   INR  --   --   --   --   --   --  1.64*   * 145* 144   < > 141  --  141   POTASSIUM 4.9 4.4 3.9   < > 3.4  --  3.7   CHLORIDE 118* 117* 117*   < > 114*  --  113*   CO2 24 23 20   < > 18*  --  18*   BUN 52* 46* 36*   < > 16  --  15   CR 1.38* 1.42* 1.48*   < > 1.44*  --  1.39*   ANIONGAP 3 5 7   < > 9  --  9   BEE 8.7 8.6 8.2*   < > 7.7*  --  7.5*   * 136* 133*   < > 134*  --  79   ALBUMIN  --   --  1.8*  --  2.3*  --  2.3*   PROTTOTAL  --   --  5.2*  --  5.3*  --  5.3*   BILITOTAL  --   --  1.7*  --  1.7*  --  2.0*   ALKPHOS  --   --  561*  --  353*  --  340*   ALT  --   --  22  --  14   < > 16   AST  --   --  42  --  30   < > Canceled, Test credited    < > = values in this interval not displayed.     No results found for this or any previous visit (from the past 24 hour(s)).

## 2020-06-19 NOTE — PROGRESS NOTES
Social Work Services Progress Note    Hospital Day: 7  Date of Initial Social Work Evaluation:  6/18/2020  Collaborated with:  Patient's [facility] CADI  Miranda ([phone])    Data:  59 year old male who underwent left hip replacement 05/29/2020, who was found down in his garage by a home nurse. He was transferred to [facility] from [facility] after he became febrile, had multiple seizures, became less response, CIWA 26, subsequently intubated.     Intervention:  NIKO returned call to pt's CADI worker, who had left NIKO a voicemail yesterday. Miranda was able to detail patient's home services. He has been receiving home health RN for medication management, as per Miranda pt struggles with medication compliance. He also receives Meals on Wheels (1 meal per day), independent home supports (homemaking services, 12 hrs/week), housing access coordination and an emergency alert. Miranda reports that he has trouble engaging with services and difficulty accepting his own limitations. She also notes some confusion and slurred speech when she speaks with him, but feels that she is not clear on whether this is baseline or related to his alcohol use.    SW provided direct callback number and will plan on continuing to coordinate care at discharge with Miranda.    Assessment:  Did not meet with pt for this intervention    Plan:    Anticipated Disposition:  TBD    Barriers to d/c plan:  Pt remains critically ill in ICU    Follow Up:  SW will continue to remain available for patient and family support, discharge planning, other resources and support PRN.    AUTUMN Fofana, [facility]  ICU    [facility]   P: [phone]  Pager: [phone]

## 2020-06-19 NOTE — PROGRESS NOTES
[facility], [place]  Trauma Service Progress Note    Date of Service (when I saw the patient): 06/19/2020     Assessment & Plan     Trauma Mechanism: Found down   Time/date of injury:6/12/2020  Known Injuries:  1. Multiple right sided 3rd to 8th rib fractures  Other issues:  2. Acute encephalopathy  3. Urosepsis  4. Possible seizure  5. Possible EtOH withdrawal             Neuro/Pain:  # Encephalopathy 2/2 metabolic disturbance vs sepsis vs ETOH withdrawal seizures  # Acute on chronic pain   # Encephalopathy   - CT head:without traumatic injuries or stroke  - Monitor neurological status. Delirium prevention and precautions.   - Pain regimen: Tylenol, Lidoderm patches, baclofen (PTA med), Gabapentin (PTA med), Oxycodone for breakthrough pain  - CIWA Ar: Scheduled Clonidine and Gabapentin, folic, thiamine supplements, no benzos.   - Neurology was consulted for seizures, AMS, not started on any AED as they felt his seizures were provoked by one or a combination of, urosepsis,, ETOH withdrawal, other metabolic derangements.   -VEEG 06/14 without subclinical seizures.   - Evaluated by regional anesthesia and not an ideal candidate 2/2 coagulopathic liver disease    Pulmonary:  # Rib fractures  # Acute hypoxic respiratory failure: now extubated  # Nicotine dependence  # COPD  - Pain control and pulmonary toilet, primary management for rib fractures, f/u cxr without PTX, actelectasis  -COPD regimen:  PRN albuterol, Breo Ellipta, Singulair  - Supplemental oxygen to keep saturation above 92 %.  - Incentive spirometer while awake, cough deep breaths    Cardiovascular:    # Septic shock: resolved off pressors  # HTN, HLD  - Atorvastatin, Propranolol, Home Lasix    - Monitor hemodynamic status.     FEN/GI  # Protein calorie deficit malnutrition   # Liver cirrhosis  - Enteric nutrition via tubefeed at goal, on scheduled neutraphos for hypophosphetemia    - electrolyte replacement protocol  In place.    - Rifaximin     Renal:   # Urosepsis   # Left upper pole obstructing stone  # Hx recurrent UTI's  # Acute kidney injury on CKD  CT abdomen showed hydronephrosis, + UA, was seen by Urology and had a cystogram and left retrograde pyelogram with placement of a left ureteral stent placement on 06/13 by Dr. Lino. Follow up CT showed good left ureteral stent position. Urine output adequate with ga in place, diuresing as well and white count down trending. Definitive stone treatment per urology once acute illness resolves.   UTI treatment below  - Patient has been scheduled for office visit with Dr. Hale in ~1 month, sister aware  - Please page urology prior to discharge to discuss plan with patient in person    Endocrine:  - blood glucoses WNL  - No management indication Goal to keep BG < 180 for optimal wound healing     Infectious disease:   Cultures:  UC 6/12: >100K GNRs (Enterobacter cloacae)  BC 6/12: E. Cloacae  UC left renal pelvis 6/13: Enterobacter C.  06/14 BC x2  NGTD  06/15 sputum cx NGTD  06/16 cdiff negative  Antibiotics   - Cefepime x 14day course to 06/26  Hematology:     # Anemia of critical illness    # Acute blood loss anemia   - Hemoglobin 7.3. Monitor and trend. Threshold for transfusion if hgb <7.0 or signs/symptoms of hypoperfusion.       Musculoskeletal:  # Known rib fractures  # Recent left hip replacement 05/29/2020  # Weakness and deconditioning of critical illness   - Physical and occupational therapy consults.    Skin:  Diligent cares to prevent skin breakdown and wound formation.      Lines/ tubes/ drains:  - Ga  NJ tube  TLIJ    General Cares:    PPI/H2 blocker:  PPI   DVT prophylaxis: Heparin SQ   Bowel Regimen/Date of last stool: rectal tube with loose BM   Pulmonary toilet: as able, cough deep breath   Lines / drains: Ga cath remains 2nd to immobility and need for strict I&O              Disposition: transfer to floor to the medicine team, discussed with Dr. Galeano  Jones  Code status:  Full     Interval History   Lethargic, mumbles illogical statements when awake  ROS x 8 negative with exception of those things listed in interval hx    Physical Exam   Temp: 98.5  F (36.9  C) Temp src: Axillary BP: 129/71 Pulse: 82 Heart Rate: 82 Resp: 17 SpO2: 99 % O2 Device: Nasal cannula Oxygen Delivery: 2 LPM  Vitals:    06/17/20 2000 06/18/20 0300 06/19/20 0100   Weight: 89.4 kg (197 lb 1.5 oz) 90.3 kg (199 lb 1.2 oz) 88.7 kg (195 lb 8.8 oz)     Vital Signs with Ranges  Temp:  [97.8  F (36.6  C)-98.8  F (37.1  C)] 98.5  F (36.9  C)  Pulse:  [72-83] 82  Heart Rate:  [71-84] 82  Resp:  [13-24] 17  BP: ()/(53-88) 129/71  SpO2:  [92 %-99 %] 99 %  I/O last 3 completed shifts:  In: 2580 [I.V.:635; NG/GT:745]  Out: 2485 [Urine:2235; Stool:250]    [place] Coma Scale - Total 13-14/15  Neuro: Oriented to self, moves all extremities  Cardiac: S1S2  Resp: Coarse, no wheezes, good cough effort  Neuro: arouses to voice, no focal deficits  GI: NJ tube in place, Rounded and soft, +BS, loose stool via rectal tube  : urine per ga, susan  MSK: Moves all joints spont  Psych: confused, illogical    LOPEZ Corea CNP  To contact the trauma service use job code pager [phone],   Numeric texts or alpha text through [facility]

## 2020-06-19 NOTE — PROGRESS NOTES
SURGICAL ICU PROGRESS NOTE  June 19, 2020      PRIMARY TEAM: Trauma  PRIMARY PHYSICIAN: Dr. Walls     REASON FOR CRITICAL CARE ADMISSION: Intubated and hemodynamic monitoring  ADMITTING PHYSICIAN: Dr. Dodson        ASSESSMENT: Abhijeet Mccauley is a 59 year old male with hx of alcohol abuse, liver cirrhosis MELD score 16, COPD, ulcerative colitis and renal insufficiency, 2 weeks s/p left hip replacement, presents after multiple falls, sustained R rib 3-8 fx, UA was positive, intubated in [facility] for seizures.     Today's Changes:  - Resume PTA buproprion  - Increase Lasix to 40mg BID; leave ga while diuresing  - Switch to sodium phos replacement protocol  - Defer internal jugular to floor  - Transfer to floor     PLAN:   Neuro/ pain/ sedation:  # Possible seizure disorder  Monitor neurological status. Notify the MD for any acute changes in exam.   CIWA protocol              - PRN ativan 1-2mg q4h prn -- d/c'd at 4pm yesterday              - Clonidine 0.1 mg Q8H rm              - Gabapentin 600 mg Q8H rm     Pain:   Dilaudid 0.2 - 0.4 mg Q1H PRN   Oxycodone 5 mg Q4H prn,   baclofen 5 mg q8h   acetaminophen 975 MG Q8H     - Haloperidol 5 mg Q4H PRN for agitation     - nicotine patch      Home meds  -resume PTA buproprion today   Hold for now: Tramadol, Trazadone, hydroxyzine    Pulmonary care:   SpO2 92 - 99% on 2-3 L NC     - Breo, montelukast 10 mg at bedtime  - Albuterol 2.5 mg Q4H PRN     # Rib fractures  R rib 3-8 fx, multiple have anterior and posterior components              Encourage use of IS      Cardiovascular:    # HLD  Monitor hemodynamic status.      MAPs   HR 71 - 84, sinus     - Atorvastatin 20 mg daily  -  mg daily   - Furosemide 20 mg daily -- increase to 40mg BID  - Propranolol 20 mg BID    - Spironolactone     GI care:   # Alcohol abuse, liver cirrhosis MELD score 16  NPO except meds  - TFs via NJT at 50, goal rate  - Rifaximin 550 mg BID   - Thiamine and folic acid, multivitamin       - Dulcolax supp 10 mg daily PRN, PEG daily PRN              Last BM 6/19, rectal tube in place      Home meds  Spironolactone 50 mg daily held restart      Fluids/ Electrolytes/ Nutrition:   - Electrolyte replacement protocol      Continue to monitor I/Os              I/O: 2.5/ 1.9 (+600 mL)              UOP 2L              + 10L since admission     Na 145 (145)  K 4.9  P 2.2 -- switch to sodium phos protocol  Cr 1.38 (1.42)    Chong while diuresing; otherwise okay to eventually remove    Urology  Plan:  - Patient has been scheduled for office visit with Dr. Hale in ~1 month, sister aware  - Please page urology prior to discharge so we can discuss plan with patient in person  - Urology will sign off, please page on-call with questions      Endocrine:    No history of diabetes  Sliding scale for diabetes management.               BG 110s-140s              NO insulin       ID/ Antibiotics:  #UTI, h/o enterobacter infections     Afebrile  WBC 14.7 (14.2)     - Cefepime (14 day course of total antibiotics, end 6/26)  - Left superior pole calectasis s/p cystoscopy with retrograde pyelogram and ureteral stent insertion 6/13  - Urology following:              - Continue catheter until afebrile/off pressors/off vent for 24 hrs              - Continue abx per primary, tailoring to cultures including intraop renal pelvis urine culture              - Urology will continue to follow, please call with questions              - Definitive stone treatment to be arranged upon resolution of acute illness    Bcx 6/12: Enterobacter cloacae  BCx 6/14 NGTD  Ucx 6/13: Enterobacter cloacae  C diff negative  Sputum Cx 6/15: NGTD     Heme:     Hgb 7.3 (8.4) -- continue to monitor, possible dilutional  Plt 100 (93)      Prophylaxis:    Mechanical prophylaxis for DVT  Protonix 40 mg BID enteral   SubQ heparin TID          Miscellaneous:    PT, OT, social work for dispo       Lines/ tubes/ drains:  NJT, PIV x2, urinary catheter, rectal  tube, RIJ    Defer RIJ to floor      Disposition:  Transfer to floor     Patient seen and discussed with staff.    Dewey Song MD  Surgery     ====================================    TODAY'S SUBJECTIVE/INTERVAL HISTORY:   - Agitated overnight, otherwise no acute events    OBJECTIVE:     Temp:  [97.8  F (36.6  C)-98.8  F (37.1  C)] 98.7  F (37.1  C)  Pulse:  [72-83] 80  Heart Rate:  [71-84] 80  Resp:  [13-24] 18  BP: ()/(53-88) 120/70  SpO2:  [92 %-99 %] 99 %  Resp: 18      I/O last 3 completed shifts:  In: 2580 [I.V.:635; NG/GT:745]  Out: 2485 [Urine:2235; Stool:250]    General/Neuro: Awake and alert (name only), answering questions appropriately  Pulm: Non-labored breathing on 2 LNC  Abd: soft; NT, ND  Extremities: +1 edema  Skin: warm and well-perfused.     LABS:   Arterial Blood Gases   Recent Labs   Lab 06/17/20  0921 06/16/20  1216 06/16/20  0845 06/15/20  0332   PH 7.44 7.39 7.38 7.37   PCO2 29* 34* 34* 28*   PO2 75* 110* 103 74*   HCO3 19* 20* 20* 16*     Complete Blood Count   Recent Labs   Lab 06/19/20  0433 06/18/20  0339 06/17/20  0358 06/16/20  0344   WBC 14.7* 14.2* 17.8* 19.5*   HGB 7.3* 8.4* 8.3* 7.8*   * 93* 89* 73*     Basic Metabolic Panel  Recent Labs   Lab 06/19/20  0433 06/18/20  0339 06/17/20  0358 06/16/20  1209 06/16/20  0344   * 145* 144  --  142   POTASSIUM 4.9 4.4 3.9 4.2 3.7   CHLORIDE 118* 117* 117*  --  117*   CO2 24 23 20  --  20   BUN 52* 46* 36*  --  29   CR 1.38* 1.42* 1.48*  --  1.50*   * 136* 133*  --  122*     Liver Function Tests  Recent Labs   Lab 06/17/20  0358 06/13/20  0443 06/12/20  2209 06/12/20 2110 06/12/20  1005   AST 42 30 Canceled, Test credited Canceled, Test credited 38   ALT 22 14 16 16 19   ALKPHOS 561* 353*  --  340* 464*   BILITOTAL 1.7* 1.7*  --  2.0* 1.9*   ALBUMIN 1.8* 2.3*  --  2.3* 2.9*   INR  --   --   --  1.64* 1.47*     Pancreatic Enzymes  Recent Labs   Lab 06/12/20  1005   LIPASE 192     Coagulation Profile  Recent Labs    Lab 06/12/20 2110 06/12/20  1005   INR 1.64* 1.47*   PTT 33 37         IMAGING:   No results found for this or any previous visit (from the past 24 hour(s)).

## 2020-06-20 ENCOUNTER — APPOINTMENT (OUTPATIENT)
Dept: ULTRASOUND IMAGING | Facility: CLINIC | Age: 60
End: 2020-06-20
Attending: STUDENT IN AN ORGANIZED HEALTH CARE EDUCATION/TRAINING PROGRAM
Payer: COMMERCIAL

## 2020-06-20 ENCOUNTER — APPOINTMENT (OUTPATIENT)
Dept: GENERAL RADIOLOGY | Facility: CLINIC | Age: 60
End: 2020-06-20
Attending: STUDENT IN AN ORGANIZED HEALTH CARE EDUCATION/TRAINING PROGRAM
Payer: COMMERCIAL

## 2020-06-20 ENCOUNTER — APPOINTMENT (OUTPATIENT)
Dept: SPEECH THERAPY | Facility: CLINIC | Age: 60
End: 2020-06-20
Attending: STUDENT IN AN ORGANIZED HEALTH CARE EDUCATION/TRAINING PROGRAM
Payer: COMMERCIAL

## 2020-06-20 LAB
ALBUMIN SERPL-MCNC: 1.9 G/DL (ref 3.4–5)
ALBUMIN SERPL-MCNC: 1.9 G/DL (ref 3.4–5)
ALP SERPL-CCNC: 738 U/L (ref 40–150)
ALP SERPL-CCNC: 746 U/L (ref 40–150)
ALT SERPL W P-5'-P-CCNC: 28 U/L (ref 0–70)
ALT SERPL W P-5'-P-CCNC: 29 U/L (ref 0–70)
ANION GAP SERPL CALCULATED.3IONS-SCNC: 6 MMOL/L (ref 3–14)
ANISOCYTOSIS BLD QL SMEAR: ABNORMAL
AST SERPL W P-5'-P-CCNC: 51 U/L (ref 0–45)
AST SERPL W P-5'-P-CCNC: 53 U/L (ref 0–45)
BACTERIA SPEC CULT: ABNORMAL
BACTERIA SPEC CULT: NO GROWTH
BACTERIA SPEC CULT: NO GROWTH
BASOPHILS # BLD AUTO: 0.1 10E9/L (ref 0–0.2)
BASOPHILS NFR BLD AUTO: 0.9 %
BILIRUB DIRECT SERPL-MCNC: 0.9 MG/DL (ref 0–0.2)
BILIRUB SERPL-MCNC: 1.2 MG/DL (ref 0.2–1.3)
BILIRUB SERPL-MCNC: 1.3 MG/DL (ref 0.2–1.3)
BUN SERPL-MCNC: 57 MG/DL (ref 7–30)
CALCIUM SERPL-MCNC: 8.6 MG/DL (ref 8.5–10.1)
CHLORIDE SERPL-SCNC: 114 MMOL/L (ref 94–109)
CO2 SERPL-SCNC: 25 MMOL/L (ref 20–32)
CREAT SERPL-MCNC: 1.46 MG/DL (ref 0.66–1.25)
DIFFERENTIAL METHOD BLD: ABNORMAL
EOSINOPHIL # BLD AUTO: 0.3 10E9/L (ref 0–0.7)
EOSINOPHIL NFR BLD AUTO: 1.7 %
ERYTHROCYTE [DISTWIDTH] IN BLOOD BY AUTOMATED COUNT: 28.1 % (ref 10–15)
FERRITIN SERPL-MCNC: 240 NG/ML (ref 26–388)
GFR SERPL CREATININE-BSD FRML MDRD: 51 ML/MIN/{1.73_M2}
GGT SERPL-CCNC: 1230 U/L (ref 0–75)
GLUCOSE BLDC GLUCOMTR-MCNC: 140 MG/DL (ref 70–99)
GLUCOSE SERPL-MCNC: 152 MG/DL (ref 70–99)
GRAM STN SPEC: ABNORMAL
HCT VFR BLD AUTO: 27 % (ref 40–53)
HGB BLD-MCNC: 7.8 G/DL (ref 13.3–17.7)
LYMPHOCYTES # BLD AUTO: 2.1 10E9/L (ref 0.8–5.3)
LYMPHOCYTES NFR BLD AUTO: 13 %
Lab: ABNORMAL
MACROCYTES BLD QL SMEAR: PRESENT
MCH RBC QN AUTO: 30.8 PG (ref 26.5–33)
MCHC RBC AUTO-ENTMCNC: 28.9 G/DL (ref 31.5–36.5)
MCV RBC AUTO: 107 FL (ref 78–100)
MONOCYTES # BLD AUTO: 2.6 10E9/L (ref 0–1.3)
MONOCYTES NFR BLD AUTO: 16.5 %
MYELOCYTES # BLD: 0.6 10E9/L
MYELOCYTES NFR BLD MANUAL: 3.5 %
NEUTROPHILS # BLD AUTO: 10.1 10E9/L (ref 1.6–8.3)
NEUTROPHILS NFR BLD AUTO: 63.5 %
NT-PROBNP SERPL-MCNC: 2006 PG/ML (ref 0–900)
PLATELET # BLD AUTO: 134 10E9/L (ref 150–450)
PLATELET # BLD EST: ABNORMAL 10*3/UL
POIKILOCYTOSIS BLD QL SMEAR: SLIGHT
POLYCHROMASIA BLD QL SMEAR: SLIGHT
POTASSIUM SERPL-SCNC: 4.4 MMOL/L (ref 3.4–5.3)
PROMYELOCYTES # BLD MANUAL: 0.1 10E9/L
PROMYELOCYTES NFR BLD MANUAL: 0.9 %
PROT SERPL-MCNC: 6 G/DL (ref 6.8–8.8)
PROT SERPL-MCNC: 6 G/DL (ref 6.8–8.8)
RBC # BLD AUTO: 2.53 10E12/L (ref 4.4–5.9)
SODIUM SERPL-SCNC: 144 MMOL/L (ref 133–144)
SPECIMEN SOURCE: ABNORMAL
SPECIMEN SOURCE: ABNORMAL
SPECIMEN SOURCE: NORMAL
SPECIMEN SOURCE: NORMAL
T PALLIDUM AB SER QL: NONREACTIVE
WBC # BLD AUTO: 15.9 10E9/L (ref 4–11)

## 2020-06-20 PROCEDURE — 94640 AIRWAY INHALATION TREATMENT: CPT

## 2020-06-20 PROCEDURE — 00000146 ZZHCL STATISTIC GLUCOSE BY METER IP

## 2020-06-20 PROCEDURE — 92610 EVALUATE SWALLOWING FUNCTION: CPT | Mod: GN

## 2020-06-20 PROCEDURE — 12000001 ZZH R&B MED SURG/OB UMMC

## 2020-06-20 PROCEDURE — 99233 SBSQ HOSP IP/OBS HIGH 50: CPT | Mod: GC | Performed by: STUDENT IN AN ORGANIZED HEALTH CARE EDUCATION/TRAINING PROGRAM

## 2020-06-20 PROCEDURE — 94640 AIRWAY INHALATION TREATMENT: CPT | Mod: 76

## 2020-06-20 PROCEDURE — 87529 HSV DNA AMP PROBE: CPT | Performed by: STUDENT IN AN ORGANIZED HEALTH CARE EDUCATION/TRAINING PROGRAM

## 2020-06-20 PROCEDURE — 25000128 H RX IP 250 OP 636: Performed by: NURSE PRACTITIONER

## 2020-06-20 PROCEDURE — 71045 X-RAY EXAM CHEST 1 VIEW: CPT

## 2020-06-20 PROCEDURE — 36415 COLL VENOUS BLD VENIPUNCTURE: CPT | Performed by: STUDENT IN AN ORGANIZED HEALTH CARE EDUCATION/TRAINING PROGRAM

## 2020-06-20 PROCEDURE — 92526 ORAL FUNCTION THERAPY: CPT | Mod: GN

## 2020-06-20 PROCEDURE — 25000132 ZZH RX MED GY IP 250 OP 250 PS 637: Performed by: NURSE PRACTITIONER

## 2020-06-20 PROCEDURE — 80076 HEPATIC FUNCTION PANEL: CPT | Performed by: STUDENT IN AN ORGANIZED HEALTH CARE EDUCATION/TRAINING PROGRAM

## 2020-06-20 PROCEDURE — 80053 COMPREHEN METABOLIC PANEL: CPT | Performed by: SURGERY

## 2020-06-20 PROCEDURE — 85025 COMPLETE CBC W/AUTO DIFF WBC: CPT | Performed by: STUDENT IN AN ORGANIZED HEALTH CARE EDUCATION/TRAINING PROGRAM

## 2020-06-20 PROCEDURE — 76705 ECHO EXAM OF ABDOMEN: CPT

## 2020-06-20 PROCEDURE — 27210437 ZZH NUTRITION PRODUCT SEMIELEM INTERMED LITER

## 2020-06-20 PROCEDURE — 25000132 ZZH RX MED GY IP 250 OP 250 PS 637: Performed by: STUDENT IN AN ORGANIZED HEALTH CARE EDUCATION/TRAINING PROGRAM

## 2020-06-20 PROCEDURE — 87591 N.GONORRHOEAE DNA AMP PROB: CPT | Performed by: STUDENT IN AN ORGANIZED HEALTH CARE EDUCATION/TRAINING PROGRAM

## 2020-06-20 PROCEDURE — 25000128 H RX IP 250 OP 636: Performed by: STUDENT IN AN ORGANIZED HEALTH CARE EDUCATION/TRAINING PROGRAM

## 2020-06-20 PROCEDURE — 82977 ASSAY OF GGT: CPT | Performed by: STUDENT IN AN ORGANIZED HEALTH CARE EDUCATION/TRAINING PROGRAM

## 2020-06-20 PROCEDURE — 82728 ASSAY OF FERRITIN: CPT | Performed by: STUDENT IN AN ORGANIZED HEALTH CARE EDUCATION/TRAINING PROGRAM

## 2020-06-20 PROCEDURE — 83880 ASSAY OF NATRIURETIC PEPTIDE: CPT | Performed by: STUDENT IN AN ORGANIZED HEALTH CARE EDUCATION/TRAINING PROGRAM

## 2020-06-20 PROCEDURE — 40000275 ZZH STATISTIC RCP TIME EA 10 MIN

## 2020-06-20 PROCEDURE — 87205 SMEAR GRAM STAIN: CPT | Performed by: STUDENT IN AN ORGANIZED HEALTH CARE EDUCATION/TRAINING PROGRAM

## 2020-06-20 PROCEDURE — 25000125 ZZHC RX 250: Performed by: NURSE PRACTITIONER

## 2020-06-20 PROCEDURE — 87491 CHLMYD TRACH DNA AMP PROBE: CPT | Performed by: STUDENT IN AN ORGANIZED HEALTH CARE EDUCATION/TRAINING PROGRAM

## 2020-06-20 RX ORDER — CEFTRIAXONE 2 G/1
2 INJECTION, POWDER, FOR SOLUTION INTRAMUSCULAR; INTRAVENOUS EVERY 24 HOURS
Status: DISCONTINUED | OUTPATIENT
Start: 2020-06-20 | End: 2020-06-22

## 2020-06-20 RX ORDER — BUPROPION HYDROCHLORIDE 100 MG/1
100 TABLET ORAL EVERY 8 HOURS
Status: DISCONTINUED | OUTPATIENT
Start: 2020-06-21 | End: 2020-06-25 | Stop reason: HOSPADM

## 2020-06-20 RX ADMIN — BUPROPION HYDROCHLORIDE 100 MG: 100 TABLET, FILM COATED ORAL at 22:03

## 2020-06-20 RX ADMIN — Medication 1 PACKET: at 16:42

## 2020-06-20 RX ADMIN — LIDOCAINE 1 PATCH: 560 PATCH PERCUTANEOUS; TOPICAL; TRANSDERMAL at 16:43

## 2020-06-20 RX ADMIN — ALBUTEROL SULFATE 2.5 MG: 2.5 SOLUTION RESPIRATORY (INHALATION) at 17:20

## 2020-06-20 RX ADMIN — BUPROPION HYDROCHLORIDE 100 MG: 100 TABLET, FILM COATED ORAL at 13:59

## 2020-06-20 RX ADMIN — DIBASIC SODIUM PHOSPHATE, MONOBASIC POTASSIUM PHOSPHATE AND MONOBASIC SODIUM PHOSPHATE 500 MG: 852; 155; 130 TABLET ORAL at 08:40

## 2020-06-20 RX ADMIN — ASPIRIN 81 MG CHEWABLE TABLET 81 MG: 81 TABLET CHEWABLE at 08:38

## 2020-06-20 RX ADMIN — CEFTRIAXONE SODIUM 2 G: 2 INJECTION, POWDER, FOR SOLUTION INTRAMUSCULAR; INTRAVENOUS at 16:42

## 2020-06-20 RX ADMIN — FUROSEMIDE 40 MG: 10 INJECTION, SOLUTION INTRAMUSCULAR; INTRAVENOUS at 11:47

## 2020-06-20 RX ADMIN — MULTIVITAMIN 15 ML: LIQUID ORAL at 08:34

## 2020-06-20 RX ADMIN — Medication 5 MG: at 13:59

## 2020-06-20 RX ADMIN — Medication 5 MG: at 08:37

## 2020-06-20 RX ADMIN — FOLIC ACID 1 MG: 1 TABLET ORAL at 08:36

## 2020-06-20 RX ADMIN — CEFEPIME 2 G: 2 INJECTION, POWDER, FOR SOLUTION INTRAVENOUS at 04:52

## 2020-06-20 RX ADMIN — GABAPENTIN 600 MG: 300 CAPSULE ORAL at 13:59

## 2020-06-20 RX ADMIN — PROPRANOLOL HYDROCHLORIDE 20 MG: 10 TABLET ORAL at 20:11

## 2020-06-20 RX ADMIN — FLUTICASONE FUROATE AND VILANTEROL TRIFENATATE 1 PUFF: 200; 25 POWDER RESPIRATORY (INHALATION) at 08:34

## 2020-06-20 RX ADMIN — MONTELUKAST 10 MG: 10 TABLET, FILM COATED ORAL at 22:03

## 2020-06-20 RX ADMIN — THIAMINE HCL TAB 100 MG 100 MG: 100 TAB at 08:36

## 2020-06-20 RX ADMIN — DIBASIC SODIUM PHOSPHATE, MONOBASIC POTASSIUM PHOSPHATE AND MONOBASIC SODIUM PHOSPHATE 500 MG: 852; 155; 130 TABLET ORAL at 20:11

## 2020-06-20 RX ADMIN — Medication 1 PACKET: at 20:31

## 2020-06-20 RX ADMIN — ACETAMINOPHEN 650 MG: 325 TABLET, FILM COATED ORAL at 00:21

## 2020-06-20 RX ADMIN — SPIRONOLACTONE 50 MG: 25 TABLET ORAL at 08:36

## 2020-06-20 RX ADMIN — ALBUTEROL SULFATE 2.5 MG: 2.5 SOLUTION RESPIRATORY (INHALATION) at 12:14

## 2020-06-20 RX ADMIN — ACETAMINOPHEN 650 MG: 325 TABLET, FILM COATED ORAL at 16:43

## 2020-06-20 RX ADMIN — GABAPENTIN 600 MG: 300 CAPSULE ORAL at 20:11

## 2020-06-20 RX ADMIN — Medication 40 MG: at 08:34

## 2020-06-20 RX ADMIN — RIFAXIMIN 550 MG: 550 TABLET ORAL at 20:11

## 2020-06-20 RX ADMIN — NICOTINE 1 PATCH: 14 PATCH, EXTENDED RELEASE TRANSDERMAL at 08:35

## 2020-06-20 RX ADMIN — Medication 5000 UNITS: at 01:41

## 2020-06-20 RX ADMIN — Medication 40 MG: at 16:43

## 2020-06-20 RX ADMIN — ACETAMINOPHEN 650 MG: 325 TABLET, FILM COATED ORAL at 08:38

## 2020-06-20 RX ADMIN — PROPRANOLOL HYDROCHLORIDE 20 MG: 10 TABLET ORAL at 08:39

## 2020-06-20 RX ADMIN — GABAPENTIN 600 MG: 300 CAPSULE ORAL at 08:37

## 2020-06-20 RX ADMIN — FUROSEMIDE 40 MG: 10 INJECTION, SOLUTION INTRAMUSCULAR; INTRAVENOUS at 21:56

## 2020-06-20 RX ADMIN — Medication 5000 UNITS: at 20:10

## 2020-06-20 RX ADMIN — BUPROPION HYDROCHLORIDE 100 MG: 100 TABLET, FILM COATED ORAL at 05:01

## 2020-06-20 RX ADMIN — Medication 5 MG: at 20:11

## 2020-06-20 RX ADMIN — Medication 5000 UNITS: at 10:46

## 2020-06-20 RX ADMIN — CLONIDINE HYDROCHLORIDE 0.1 MG: 0.1 TABLET ORAL at 00:21

## 2020-06-20 RX ADMIN — CLONIDINE HYDROCHLORIDE 0.1 MG: 0.1 TABLET ORAL at 08:39

## 2020-06-20 RX ADMIN — ATORVASTATIN CALCIUM 20 MG: 20 TABLET, FILM COATED ORAL at 08:38

## 2020-06-20 RX ADMIN — RIFAXIMIN 550 MG: 550 TABLET ORAL at 08:39

## 2020-06-20 ASSESSMENT — ACTIVITIES OF DAILY LIVING (ADL)
ADLS_ACUITY_SCORE: 36
ADLS_ACUITY_SCORE: 40
ADLS_ACUITY_SCORE: 40
ADLS_ACUITY_SCORE: 36

## 2020-06-20 NOTE — PLAN OF CARE
Discharge Planner SLP   Patient plan for discharge: Not discussed  Current status: Clinical dysphagia examination completed per MD order. The patient presents with moderate to severe oropharyngeal dysphagia impacted by reduced laryngeal elevation, impulsivity and reduced insight into deficits. Baseline swallowing skills are not known, though the patient reports no prior difficulty. Upon this exam he presented with positive signs/symptoms of aspiration across multiple PO trials. Adequate safe swallowing present with honey thick consistency by spoon and cup and with puree. Recommend the patient continue TF as primary nutrition source.OK to initiate small sips of a honey thick consistency full liquid diet with supervision to implement safety precautions (small sips by spoon or cup when sitting upright. No straws.) SLP will f/u for dysphagia tx to include safe tolerance and readiness for safe diet advancement.   Barriers to return to prior living situation: medical status, dysphagia with modified diet  Recommendations for discharge: TCU  Rationale for recommendations: SLP tx for below baseline swallowing skills and diet level       Entered by: Yadi Auguste 06/20/2020 1:02 PM

## 2020-06-20 NOTE — PROGRESS NOTES
[facility], [place]    Progress Note - [place] Service        Date of Admission:  6/12/2020    Assessment & Plan   Abhijeet Mccauley is a 59 year old male with a history of alcohol use disorder, alcohol withdrawal seizures, EtOH cirrhosis, COPD, ulcerative colitis, CKD III, AML (diagnosed 2008, s/p chemotherapy), and s/p left hip replacement ~2 weeks prior to admission admitted on 6/12/2020 after multiple falls c/b right 3-8 rib fractures with concern for alcohol withdrawal and seizures.     Pulmonary edema  Acute hypoxic respiratory failure, improving  Now extubated and weaned to room air. Weight today 88.7 kg, up from admission of 84 kg. No crackles on pulmonary exam, but poor air movement to fully evaluate. Chest X-ray today mildly more fluid-overloaded than last on 6/14. Thus, will continue diuresing. Last echo in 2017 with EF 55-60%. Will obtain NT-pro BNP and echo today to better evaluate for heart failure. He does have known ascites and had been on furosemide 20 mg oral daily prior to admission, which was held during his ICU admission in the setting of sepsis.   - Lasix 40 mg IV BID currently scheduled--will re-evaluate dosing later today  - TTE  - Daily BMP, Mg  - Incentive spirometry  - Daily weights    Genital lesions  See images under misc tab. Patient denies significant pain. Differential diagnosis includes STI (syphilis, HSV, lymphogranuloma venereum, haemophilus ducreyi) vs wound from prior edema and possible component of candida/intertrigo.   - Treponema Abs w/ reflex to RPR and titer  - Gonorrhea and chlamydia swabs of lesion  - HSV swab of lesions  - [facility] nurse consult    Urinary tract infection (enterobacter cloacae)  Left upper pole obstructing stone  S/p left ureteral stent placement on 06/13 by Dr. Lino. Definitive stone treatment per urology once acute illness resolves.   - Continue cefepime 2g Q12H for a 14 day course through 6/26 - transition to ceftriaxone  today per sensitivities  - Remove catheter today  - Patient has been scheduled for office visit with Dr. Hale in ~1 month, sister aware  - Please page urology prior to discharge to discuss plan with patient in person    Metabolic encephalopathy  Concern for seizures  Awake and alert, but still quite confused, slightly improved from yesterday. Neurology consulted previously for seizures and altered mental status--they felt seizures were provoked by a combination of alcohol withdrawal, sepsis, and metabolic derangements. Thus, no anti-seizure medication was initiated. vEEG completed 6/14 without subclinical seizures. Encephalopathy most likely multifactorial in the setting of infection, withdrawal, and ICU delirium. There could also be an element of hepatic encephalopathy, particularly given asterixis on exam. Ammonia was within normal limits.   - OT evaluation    Rib fractures  - Tylenol  650 mg Q8H (2g max in the setting of cirrhosis)  - Lidocaine patches  - Oxycodone 5 mg Q4H PRN  - Discontinue dilaudid, not using  - Continue home baclofen 5 mg TID (reduced from home dose of 10 mg TID)  - Continue home gabapentin 600 mg TID  - PT and OT evaluations    Cirrhosis  Reported history of esophageal varices, but none documented on last EGD in February.   - If concern for worsening encephalopathy, would consider adding empiric lactulose  - Continue rifaximin BID  - Continue spironolactone 50 mg daily  - Continue propranalol    Alcohol withdrawal  Alcohol use disorder  - Discontinue clonidine  - Discontinue PRN haldol  - Folic acid and thiamine supplementation  - S/p ativan protocol which was discontinued on 6/18    Acute kidney injury, resolved  Chronic kidney disease stage III  Likely secondary to septic shock. Unclear baseline in times of wellness, but appears to be ~1.7. Now improved to 1.18.   - Avoid nephrotoxins    Acute on chronic macrocytic anemia  Combination of critical illness, liver disease, alcohol use,  and recent blood loss.    CHRONIC  S/p knee replacement- Per note from 5/30, recommended 81 mg daily for 42 days (through 7/11)   COPD- continue breo ellipta, singulair, PRN albuterol  Hyperlipidemia- Continue home atorvastatin  Generalized anxiety disorder- Continue home bupropion; holding trazadone and hydroxyzine  Tobacco use- Continue nicotine patch  Gastritis- continue protonix BID     Diet: Speech eval today for possible oral diet, otherwise NPO for Medical/Clinical Reasons Except for: Meds  Adult Formula Drip Feeding: Continuous Impact Peptide 1.5; Nasoduodenal tube; Goal Rate: 50; mL/hr; Medication - Feeding Tube Flush Frequency: At least 15-30 mL water before and after medication administration and with tube clogging; Amount to Sen...    Fluids: None  Lines: PIV, NG, rectal tube--remove today, urinary catheter--remove today  DVT Prophylaxis: Heparin SQ  Chong Catheter: in place, indication: Strict 1-2 Hour I&O, Strict 1-2 Hour I&O  Code Status: Full Code           Disposition Plan   Expected discharge: 2 - 3 days, recommended to pending PT/OT evaluation once safe disposition plan/ TCU bed available.  Entered: Flora Alejo MD 06/20/2020, 5:55 AM       This patient was staffed with Dr. Jama.     Flora Alejo MD  [address], [place]  Pager: [phone]  Please see sticky note for cross cover information  ______________________________________________________________________    Interval History   No acute events overnight. No concerns this morning. Pain is well controlled. Agreeable to possible need for TCU. Mr. Mccauley is very thirsty and would like to be able to drink something.     Data reviewed today: I reviewed all medications, new labs and imaging results over the last 24 hours.     Physical Exam   Vital Signs: Temp: 98.3  F (36.8  C) Temp src: Axillary BP: 118/63 Pulse: 85 Heart Rate: 82 Resp: 20 SpO2: 94 % O2 Device: None (Room  air) Oxygen Delivery: 1 LPM  Weight: 195 lbs 8.77 oz  Constitutional: Awake and alert. Comfortable-appearing, laying in bed.    Eyes: No conjunctival injection or scleral icterus.   ENT: Scattered punctate erythematous lesions on lips.   Neck: Difficult to appreciate JVD, partially due to habitus and right neck obscured by bandage.   Respiratory: Saturating well on room air. Poor air movement. No crackles appreciated.   Cardiovascular: RRR. Distant heart sounds. Normal S1/S2. No murmurs appreciated.   GI: Distended. Soft. Nontender.   Musculoskeletal: No significant lower extremity edema.   Neurologic: Oriented to self. No facial asymmetry. Moves all extremities appropriately.     Data   Recent Labs   Lab 06/19/20  0433 06/18/20  0339 06/17/20  0358   WBC 14.7* 14.2* 17.8*   HGB 7.3* 8.4* 8.3*   * 105* 104*   * 93* 89*   * 145* 144   POTASSIUM 4.9 4.4 3.9   CHLORIDE 118* 117* 117*   CO2 24 23 20   BUN 52* 46* 36*   CR 1.38* 1.42* 1.48*   ANIONGAP 3 5 7   BEE 8.7 8.6 8.2*   * 136* 133*   ALBUMIN  --   --  1.8*   PROTTOTAL  --   --  5.2*   BILITOTAL  --   --  1.7*   ALKPHOS  --   --  561*   ALT  --   --  22   AST  --   --  42     No results found for this or any previous visit (from the past 24 hour(s)).

## 2020-06-20 NOTE — PROGRESS NOTES
Status: s/p left hip replacement ~2 weeks prior to admission admitted on 6/12/2020 after multiple falls c/b right 3-8 rib fractures with concern for alcohol withdrawal and seizures.(per MD note).  Transferred from  at 0130.   Vitals: VSS/A on RA. Sats 94%  Neuros:  Alert. Oriented to self.  Pupils equal and reactive.  Follows commands.  Strength equal bilaterally.  Verbally aggressive at times.    IV: R) PIV TKO b/w meds  Resp/trach: WDL  Diet: NPO.  (Impact Peptide 1.5) TF at goal of 50mL/hr via NJ.  Bowel status: Rectal tube in place, no output this shift.   : Chong in place.  [place] in color.  Skin: Bruising throughout.  L) hip incision DAPHNE.  Penis open ulcer.   Pain: denies  Activity: A2, mech lift.  Turned/repositioned Q2hrs.  Per report from  (has been up with walker, A2 to chair on 6/19.   Plan: Continue with current POC.

## 2020-06-20 NOTE — PROGRESS NOTES
Transferred to: Unit 6A at 0110  Belongings: with patient  Chong removed? No: due to strict I&Os  Central line removed? Yes, on previous shift  Chart and medications sent with patient Yes

## 2020-06-20 NOTE — PROGRESS NOTES
[facility] Inpatient Wound Assessment   Reason for consultation:penile wounds     Assessment  Penile wounds due to unknown etiology.  Full thickness wounds with slough.  Appearance of posterior/inferior wound not consistent with pressure injury.  STI vs edema?  Status: initial assessment    Treatment Plan  penile wounds: BID :  Cleanse wounds with microklenz spray and gauze.  Apply triad paste to posterior wound bed   Loosely wrap the penis with strip of xeroform gauze  Orders Written  WO Nurse follow-up plan:weekly  Nursing to notify the Provider(s) and re-consult the WO Nurse if wound(s) deteriorates or new skin concern.    Patient History  According to provider note(s): 59 year old male with a history of alcohol use disorder, alcohol withdrawal seizures, EtOH cirrhosis, COPD, ulcerative colitis, CKD III, AML (diagnosed 2008, s/p chemotherapy), and s/p left hip replacement ~2 weeks prior to admission admitted on 6/12/2020 after multiple falls c/b right 3-8 rib fractures with concern for alcohol withdrawal and seizures    Objective Data  Containment of urine/stool: Indwelling catheter    Active Diet Order  Orders Placed This Encounter      NPO for Medical/Clinical Reasons Except for: Meds      Output:   I/O last 3 completed shifts:  In: 2150 [I.V.:310; NG/GT:740]  Out: 2940 [Urine:2590; Stool:350]    Risk Assessment:   Sensory Perception: 3-->slightly limited  Moisture: 3-->occasionally moist  Activity: 2-->chairfast  Mobility: 2-->very limited  Nutrition: 3-->adequate  Friction and Shear: 2-->potential problem  Kartik Score: 15                          Labs:   Recent Labs   Lab 06/19/20  0433  06/17/20  0358 06/16/20  0344 06/15/20  0335  06/12/20  2110   ALBUMIN  --   --  1.8*  --   --    < > 2.3*   HGB 7.3*   < > 8.3* 7.8* 8.2*   < > 8.3*   INR  --   --   --   --   --   --  1.64*   WBC 14.7*   < > 17.8* 19.5* 30.4*   < > 28.5*   A1C  --   --   --  Canceled, Test credited Canceled, Test credited   < >  --    CRP   --   --   --   --  190.0*  --   --     < > = values in this interval not displayed.       Physical Exam  Skin inspection: focused penis      Wound Location:  penis  Date of last photo 6/19/20  Wound History: posterior wound first noted 6/15 as a blister  Measurements (length x width x depth, in cm)   posterior: 1.2  x 1.7  x  0.4 cm   Urethral:  0.5 x 0.2 x 0.2  Wound Base: 100 % slough-yellow and adherent  Tunneling N/A  Undermining N/A  Palpation of the wound bed: normal   Periwound skin: erythema- blanchable  Periwound Temperature: normal   Drainage:, none  Odor: none  Pain: mild,     Interventions  Current support surface: Standard  Atmos Air mattress  Current off-loading measures: Pillows  Visual inspection and assessment completed   Wound Care: done per plan of care  Supplies: gathered  Education provided: plan of care  Discussed plan of care with Patient and Nurse

## 2020-06-20 NOTE — PROGRESS NOTES
Status: Transferred from 4A.  Arrived on 6A at 0130.    Vitals: VSS/A on RA. Sats 94%  Neuros:  Alert. Oriented to self.  Pupils equal and reactive.  Follows commands.  Strength equal bilaterally.  Verbally aggressive at times.    IV: R) PIV TKO b/w meds  Resp/trach: WDL  Diet: NPO.  TF at goal of 50mL/hr via NJ.  Bowel status: Rectal tube in place, no output.  : Chong in place.  [place] in color.  Skin: Bruising throughout.  L) hip incision DAPHNE.  Penis open ulcer.   Pain: denies  Activity: A2, mech lift.  Turned/repositioned Q2hrs.  Per report from 4A (has been up with walker, A2 to chair on 6/19.   Plan: Continue with current POC.

## 2020-06-20 NOTE — PROGRESS NOTES
06/20/20 1200   General Information   Onset Date 06/12/20   Start of Care Date 06/20/20   Referring Physician Flora Alejo MD    Patient Profile Review/OT: Additional Occupational Profile Info See Profile for full history and prior level of function   Patient/Family Goals Statement patient would like to have something to drink   Swallowing Evaluation Bedside swallow evaluation   Behaviorial Observations WFL (within functional limits);Impulsive   Mode of current nutrition NG;NPO   Respiratory Status Room air   Type of O2 supply   (extubated 6/19/20)   Comments Abhijeet Mccauley is a 59 year old male with hx of alcohol abuse, liver cirrhosis MELD score 16, COPD, ulcerative colitis and renal insufficiency, 2 weeks s/p left hip replacement, presents after multiple falls, sustained R rib 3-8 fx, UA was positive, intubated in [facility] for seizures.   Clinical Swallow Evaluation   Oral Musculature generally intact   Dentition   (own teeth, in poor condition. Many teeth missing.)   Secretion Management   (WFL)   Mucosal Quality dry;sticky   Mandibular Strength and Mobility intact   Oral Labial Strength and Mobility WFL   Lingual Strength and Mobility WFL   Laryngeal Function Cough;Throat clear;Voicing initiated  (difficult to palpate volitional swallow)   Oral Musculature Comments WFL   Clinical Swallow Eval: Thin Liquid Texture Trial   Mode of Presentation, Thin Liquids spoon;straw;fed by clinician   Volume of Liquid or Food Presented 2 oz thin H20   Oral Phase of Swallow WFL   Pharyngeal Phase of Swallow impaired;coughing/choking;no awareness of problems;reduction in laryngeal movement;throat clearing   Diagnostic Statement elevated risk for aspiration   Clinical Swallow Eval: Nectar Thick Liquid Texture Trial   Mode of Presentation, Nectar spoon;straw;fed by clinician   Volume of Nectar Presented 2 oz nectar thick H20   Oral Phase, Nectar WFL   Pharyngeal Phase, Nectar impaired;coughing/choking;no awareness of  problems;reduction in laryngeal movement;repeated swallows;throat clearing   Diagnostic Statement elevated risk for aspiration   Clinical Swallow Eval: Honey Thick Liquid Texture Trial   Mode of Presentation, Honey cup;spoon;straw;fed by clinician   Volume of Honey Presented 3 oz honey thick H20   Oral Phase, Honey WFL   Pharyngeal Phase, Honey impaired;coughing/choking;no awareness of problems;reduction in laryngeal movement;repeated swallows;throat clearing   Diagnostic Statement Cough present via straw; appears WFL with small sips of honey thick consistency by spoon and cup   Clinical Swallow Eval: Puree Solid Texture Trial   Mode of Presentation, Puree spoon;fed by clinician   Volume of Puree Presented 3 oz puree applesauce   Oral Phase, Puree WFL   Oral Residue, Puree   (none)   Pharyngeal Phase, Puree intact   Diagnostic Statement WFL with bites of puree   Clinical Swallow Eval: Solid Food Texture Trial   Mode of Presentation, Solid self-fed   Volume of Solid Food Presented xavier cracker   Oral Phase, Solid Poor AP movement  (extended mastication)   Pharyngeal Phase, Solid impaired;coughing/choking;no awareness of problems;reduction in laryngeal movement   Diagnostic Statement elevated risk for aspiration   Esophageal Phase of Swallow   Patient reports or presents with symptoms of esophageal dysphagia Yes  (no overt difficulty during this exam; has a hx of reflux)   General Therapy Interventions   Planned Therapy Interventions Dysphagia Treatment   Dysphagia treatment Oropharyngeal exercise training;Modified diet education;Instruction of safe swallow strategies   Swallow Eval: Clinical Impressions   Skilled Criteria for Therapy Intervention Skilled criteria met.  Treatment indicated.   Functional Assessment Scale (FAS) 2   Treatment Diagnosis moderate to severe oropharyngeal dysphagia   Diet texture recommendations Full liquid;Honey thick liquids   Recommended Feeding/Eating Techniques check mouth frequently  for oral residue/pocketing;maintain upright posture during/after eating for 30 mins;no straws;small sips/bites   Demonstrates Need for Referral to Another Service dietitian;occupational therapy;physical therapy;respiratory therapy   Therapy Frequency 5x/week   Predicted Duration of Therapy Intervention (days/wks) 2 weeks   Anticipated Discharge Disposition inpatient rehabilitation facility   Risks and Benefits of Treatment have been explained. Yes   Patient, family and/or staff in agreement with Plan of Care Yes   Clinical Impression Comments Clinical dysphagia examination completed per MD order. The patient presents with moderate to severe oropharyngeal dysphagia impacted by reduced laryngeal elevation, impulsivity and reduced insight into deficits. Baseline swallowing skills are not known, though the patient reports no prior difficulty. Upon this exam he presented with positive signs/symptoms of aspiration across multiple PO trials. Adequate safe swallowing present with honey thick consistency by spoon and cup and with puree. Recommend the patient continue TF as primary nutrition source.OK to initiate small sips of a honey thick consistency full liquid diet with supervision to implement safety precautions (small sips by spoon or cup when sitting upright. No straws.) SLP will f/u for dysphagia tx to include safe tolerance and readiness for safe diet advancement.    Total Evaluation Time   Total Evaluation Time (Minutes) 15

## 2020-06-20 NOTE — PLAN OF CARE
Status: Pt here s/p left hip replacement ~2 weeks prior to admission admitted on 6/12/2020 after multiple falls c/b right 3-8 rib fractures with concern for alcohol withdrawal and seizures.     VS: VSS besides respiratory status (see below).   Neuros: Alert, oriented to self and place (knows it's hospital) otherwise disoriented to situation and time. PEERLA. Intermittently delirious, intermittent hallucinations. Can be restless and indicate.     GI:  SLP following, full liquid diet honey thickened liquids. Writer has not attempted to feed pt yet as order just got placed. TF at goal of 50cc/hr. Rectal tube removed this shift, 2 loose BMs this shift so far.   : Chong removed this morning at 10 AM and pt has had multiple incontinent episodes (6x), bladder scanned for 600cc. New Chong order placed, pt currently undergoing Echo, writer will place prior to shift change.   Respiratory: Pt can desaturated to mid 80s when turning in bed. Expiratory wheezing present and intermittent productive cough. MD notified and aware (sputum sample to be collected next time pt is coughing sputum up). Albuterol nebulizer and morning inhalers administered with some relief.   Skin: L hip incision with mild erythema, Urethral meatus of penis with wound, slough present, some discharge noted (gonorrhea, chlamydia, HSV collected and sent down).  IV: 2 PIV, one SL, one infusing TKO.   Activity: Repo q2h, Lift team assistance.  Pain: Reports R rib pain (from broken ribs) with too much movement.   Plan of care:  Continue to monitor and follow POC.

## 2020-06-21 ENCOUNTER — APPOINTMENT (OUTPATIENT)
Dept: SPEECH THERAPY | Facility: CLINIC | Age: 60
End: 2020-06-21
Payer: COMMERCIAL

## 2020-06-21 ENCOUNTER — APPOINTMENT (OUTPATIENT)
Dept: PHYSICAL THERAPY | Facility: CLINIC | Age: 60
End: 2020-06-21
Payer: COMMERCIAL

## 2020-06-21 ENCOUNTER — APPOINTMENT (OUTPATIENT)
Dept: CARDIOLOGY | Facility: CLINIC | Age: 60
End: 2020-06-21
Attending: STUDENT IN AN ORGANIZED HEALTH CARE EDUCATION/TRAINING PROGRAM
Payer: COMMERCIAL

## 2020-06-21 LAB
ALBUMIN SERPL-MCNC: 2 G/DL (ref 3.4–5)
ALP SERPL-CCNC: 796 U/L (ref 40–150)
ALT SERPL W P-5'-P-CCNC: 37 U/L (ref 0–70)
ANION GAP SERPL CALCULATED.3IONS-SCNC: 8 MMOL/L (ref 3–14)
AST SERPL W P-5'-P-CCNC: 59 U/L (ref 0–45)
BASOPHILS # BLD AUTO: 0.1 10E9/L (ref 0–0.2)
BASOPHILS NFR BLD AUTO: 0.5 %
BILIRUB SERPL-MCNC: 1.1 MG/DL (ref 0.2–1.3)
BUN SERPL-MCNC: 62 MG/DL (ref 7–30)
C TRACH DNA SPEC QL NAA+PROBE: NEGATIVE
CALCIUM SERPL-MCNC: 9 MG/DL (ref 8.5–10.1)
CHLORIDE SERPL-SCNC: 112 MMOL/L (ref 94–109)
CO2 SERPL-SCNC: 25 MMOL/L (ref 20–32)
CREAT SERPL-MCNC: 1.46 MG/DL (ref 0.66–1.25)
DIFFERENTIAL METHOD BLD: ABNORMAL
EOSINOPHIL # BLD AUTO: 0.5 10E9/L (ref 0–0.7)
EOSINOPHIL NFR BLD AUTO: 3.1 %
ERYTHROCYTE [DISTWIDTH] IN BLOOD BY AUTOMATED COUNT: 27.8 % (ref 10–15)
GFR SERPL CREATININE-BSD FRML MDRD: 51 ML/MIN/{1.73_M2}
GLUCOSE SERPL-MCNC: 145 MG/DL (ref 70–99)
HCT VFR BLD AUTO: 26.2 % (ref 40–53)
HGB BLD-MCNC: 7.9 G/DL (ref 13.3–17.7)
HGB BLD-MCNC: 8 G/DL (ref 13.3–17.7)
IMM GRANULOCYTES # BLD: 0.8 10E9/L (ref 0–0.4)
IMM GRANULOCYTES NFR BLD: 4.6 %
INR PPP: 1.44 (ref 0.86–1.14)
LYMPHOCYTES # BLD AUTO: 1.6 10E9/L (ref 0.8–5.3)
LYMPHOCYTES NFR BLD AUTO: 9.2 %
MCH RBC QN AUTO: 31.6 PG (ref 26.5–33)
MCHC RBC AUTO-ENTMCNC: 30.2 G/DL (ref 31.5–36.5)
MCV RBC AUTO: 105 FL (ref 78–100)
MONOCYTES # BLD AUTO: 2.6 10E9/L (ref 0–1.3)
MONOCYTES NFR BLD AUTO: 15.1 %
N GONORRHOEA DNA SPEC QL NAA+PROBE: NEGATIVE
NEUTROPHILS # BLD AUTO: 11.5 10E9/L (ref 1.6–8.3)
NEUTROPHILS NFR BLD AUTO: 67.5 %
NRBC # BLD AUTO: 0 10*3/UL
NRBC BLD AUTO-RTO: 0 /100
PLATELET # BLD AUTO: 150 10E9/L (ref 150–450)
POTASSIUM SERPL-SCNC: 4 MMOL/L (ref 3.4–5.3)
PROCALCITONIN SERPL-MCNC: 3.52 NG/ML
PROT SERPL-MCNC: 6.4 G/DL (ref 6.8–8.8)
RBC # BLD AUTO: 2.5 10E12/L (ref 4.4–5.9)
SODIUM SERPL-SCNC: 145 MMOL/L (ref 133–144)
SPECIMEN SOURCE: NORMAL
SPECIMEN SOURCE: NORMAL
WBC # BLD AUTO: 17 10E9/L (ref 4–11)

## 2020-06-21 PROCEDURE — 25000128 H RX IP 250 OP 636: Performed by: NURSE PRACTITIONER

## 2020-06-21 PROCEDURE — 25000132 ZZH RX MED GY IP 250 OP 250 PS 637: Performed by: NURSE PRACTITIONER

## 2020-06-21 PROCEDURE — 99233 SBSQ HOSP IP/OBS HIGH 50: CPT | Mod: GC | Performed by: STUDENT IN AN ORGANIZED HEALTH CARE EDUCATION/TRAINING PROGRAM

## 2020-06-21 PROCEDURE — 94640 AIRWAY INHALATION TREATMENT: CPT

## 2020-06-21 PROCEDURE — 40000556 ZZH STATISTIC PERIPHERAL IV START W US GUIDANCE

## 2020-06-21 PROCEDURE — 25000128 H RX IP 250 OP 636: Performed by: STUDENT IN AN ORGANIZED HEALTH CARE EDUCATION/TRAINING PROGRAM

## 2020-06-21 PROCEDURE — 12000001 ZZH R&B MED SURG/OB UMMC

## 2020-06-21 PROCEDURE — 85025 COMPLETE CBC W/AUTO DIFF WBC: CPT | Performed by: STUDENT IN AN ORGANIZED HEALTH CARE EDUCATION/TRAINING PROGRAM

## 2020-06-21 PROCEDURE — 27210437 ZZH NUTRITION PRODUCT SEMIELEM INTERMED LITER

## 2020-06-21 PROCEDURE — 92526 ORAL FUNCTION THERAPY: CPT | Mod: GN

## 2020-06-21 PROCEDURE — 25000132 ZZH RX MED GY IP 250 OP 250 PS 637: Performed by: STUDENT IN AN ORGANIZED HEALTH CARE EDUCATION/TRAINING PROGRAM

## 2020-06-21 PROCEDURE — 85610 PROTHROMBIN TIME: CPT | Performed by: STUDENT IN AN ORGANIZED HEALTH CARE EDUCATION/TRAINING PROGRAM

## 2020-06-21 PROCEDURE — 97530 THERAPEUTIC ACTIVITIES: CPT | Mod: GP

## 2020-06-21 PROCEDURE — 25000132 ZZH RX MED GY IP 250 OP 250 PS 637: Performed by: INTERNAL MEDICINE

## 2020-06-21 PROCEDURE — 40000275 ZZH STATISTIC RCP TIME EA 10 MIN

## 2020-06-21 PROCEDURE — 84145 PROCALCITONIN (PCT): CPT | Performed by: STUDENT IN AN ORGANIZED HEALTH CARE EDUCATION/TRAINING PROGRAM

## 2020-06-21 PROCEDURE — 93306 TTE W/DOPPLER COMPLETE: CPT | Mod: 26 | Performed by: INTERNAL MEDICINE

## 2020-06-21 PROCEDURE — 97116 GAIT TRAINING THERAPY: CPT | Mod: GP

## 2020-06-21 PROCEDURE — 25000125 ZZHC RX 250: Performed by: NURSE PRACTITIONER

## 2020-06-21 PROCEDURE — 93306 TTE W/DOPPLER COMPLETE: CPT

## 2020-06-21 PROCEDURE — 85018 HEMOGLOBIN: CPT | Performed by: STUDENT IN AN ORGANIZED HEALTH CARE EDUCATION/TRAINING PROGRAM

## 2020-06-21 PROCEDURE — 36415 COLL VENOUS BLD VENIPUNCTURE: CPT | Performed by: STUDENT IN AN ORGANIZED HEALTH CARE EDUCATION/TRAINING PROGRAM

## 2020-06-21 PROCEDURE — 80053 COMPREHEN METABOLIC PANEL: CPT | Performed by: STUDENT IN AN ORGANIZED HEALTH CARE EDUCATION/TRAINING PROGRAM

## 2020-06-21 RX ORDER — LANOLIN ALCOHOL/MO/W.PET/CERES
3 CREAM (GRAM) TOPICAL
Status: DISCONTINUED | OUTPATIENT
Start: 2020-06-21 | End: 2020-06-25 | Stop reason: HOSPADM

## 2020-06-21 RX ORDER — FUROSEMIDE 20 MG
20 TABLET ORAL DAILY
Status: DISCONTINUED | OUTPATIENT
Start: 2020-06-22 | End: 2020-06-24

## 2020-06-21 RX ADMIN — ATORVASTATIN CALCIUM 20 MG: 20 TABLET, FILM COATED ORAL at 09:05

## 2020-06-21 RX ADMIN — FOLIC ACID 1 MG: 1 TABLET ORAL at 09:01

## 2020-06-21 RX ADMIN — ACETAMINOPHEN 650 MG: 325 TABLET, FILM COATED ORAL at 16:45

## 2020-06-21 RX ADMIN — ACETAMINOPHEN 650 MG: 325 TABLET, FILM COATED ORAL at 23:59

## 2020-06-21 RX ADMIN — ONDANSETRON 4 MG: 4 TABLET, ORALLY DISINTEGRATING ORAL at 20:29

## 2020-06-21 RX ADMIN — Medication 5 MG: at 14:15

## 2020-06-21 RX ADMIN — BUPROPION HYDROCHLORIDE 100 MG: 100 TABLET, FILM COATED ORAL at 05:20

## 2020-06-21 RX ADMIN — GABAPENTIN 600 MG: 300 CAPSULE ORAL at 20:29

## 2020-06-21 RX ADMIN — Medication 5000 UNITS: at 01:04

## 2020-06-21 RX ADMIN — ASPIRIN 81 MG CHEWABLE TABLET 81 MG: 81 TABLET CHEWABLE at 09:01

## 2020-06-21 RX ADMIN — GABAPENTIN 600 MG: 300 CAPSULE ORAL at 14:15

## 2020-06-21 RX ADMIN — Medication 1 PACKET: at 14:16

## 2020-06-21 RX ADMIN — ACETAMINOPHEN 650 MG: 325 TABLET, FILM COATED ORAL at 09:01

## 2020-06-21 RX ADMIN — GABAPENTIN 600 MG: 300 CAPSULE ORAL at 09:00

## 2020-06-21 RX ADMIN — Medication 5 MG: at 09:01

## 2020-06-21 RX ADMIN — RIFAXIMIN 550 MG: 550 TABLET ORAL at 09:01

## 2020-06-21 RX ADMIN — ACETAMINOPHEN 650 MG: 325 TABLET, FILM COATED ORAL at 01:03

## 2020-06-21 RX ADMIN — OXYCODONE HYDROCHLORIDE 5 MG: 5 TABLET ORAL at 20:29

## 2020-06-21 RX ADMIN — PROPRANOLOL HYDROCHLORIDE 20 MG: 10 TABLET ORAL at 09:01

## 2020-06-21 RX ADMIN — MELATONIN TAB 3 MG 3 MG: 3 TAB at 01:10

## 2020-06-21 RX ADMIN — MONTELUKAST 10 MG: 10 TABLET, FILM COATED ORAL at 22:28

## 2020-06-21 RX ADMIN — LIDOCAINE 1 PATCH: 560 PATCH PERCUTANEOUS; TOPICAL; TRANSDERMAL at 14:15

## 2020-06-21 RX ADMIN — PROPRANOLOL HYDROCHLORIDE 20 MG: 10 TABLET ORAL at 20:29

## 2020-06-21 RX ADMIN — DIBASIC SODIUM PHOSPHATE, MONOBASIC POTASSIUM PHOSPHATE AND MONOBASIC SODIUM PHOSPHATE 500 MG: 852; 155; 130 TABLET ORAL at 09:00

## 2020-06-21 RX ADMIN — NICOTINE 1 PATCH: 14 PATCH, EXTENDED RELEASE TRANSDERMAL at 09:02

## 2020-06-21 RX ADMIN — Medication 1 PACKET: at 09:00

## 2020-06-21 RX ADMIN — Medication 5 MG: at 20:29

## 2020-06-21 RX ADMIN — ALBUTEROL SULFATE 2.5 MG: 2.5 SOLUTION RESPIRATORY (INHALATION) at 17:21

## 2020-06-21 RX ADMIN — Medication 40 MG: at 16:45

## 2020-06-21 RX ADMIN — BUPROPION HYDROCHLORIDE 100 MG: 100 TABLET, FILM COATED ORAL at 22:28

## 2020-06-21 RX ADMIN — Medication 40 MG: at 06:44

## 2020-06-21 RX ADMIN — MULTIVITAMIN 15 ML: LIQUID ORAL at 09:00

## 2020-06-21 RX ADMIN — BUPROPION HYDROCHLORIDE 100 MG: 100 TABLET, FILM COATED ORAL at 14:15

## 2020-06-21 RX ADMIN — Medication 1 PACKET: at 20:42

## 2020-06-21 RX ADMIN — SPIRONOLACTONE 50 MG: 25 TABLET ORAL at 09:00

## 2020-06-21 RX ADMIN — FUROSEMIDE 40 MG: 10 INJECTION, SOLUTION INTRAMUSCULAR; INTRAVENOUS at 11:15

## 2020-06-21 RX ADMIN — FLUTICASONE FUROATE AND VILANTEROL TRIFENATATE 1 PUFF: 200; 25 POWDER RESPIRATORY (INHALATION) at 09:00

## 2020-06-21 RX ADMIN — Medication 5000 UNITS: at 20:29

## 2020-06-21 RX ADMIN — RIFAXIMIN 550 MG: 550 TABLET ORAL at 20:29

## 2020-06-21 RX ADMIN — CEFTRIAXONE SODIUM 2 G: 2 INJECTION, POWDER, FOR SOLUTION INTRAMUSCULAR; INTRAVENOUS at 16:45

## 2020-06-21 RX ADMIN — Medication 5000 UNITS: at 11:16

## 2020-06-21 RX ADMIN — DIBASIC SODIUM PHOSPHATE, MONOBASIC POTASSIUM PHOSPHATE AND MONOBASIC SODIUM PHOSPHATE 500 MG: 852; 155; 130 TABLET ORAL at 20:29

## 2020-06-21 RX ADMIN — THIAMINE HCL TAB 100 MG 100 MG: 100 TAB at 09:00

## 2020-06-21 ASSESSMENT — ACTIVITIES OF DAILY LIVING (ADL)
ADLS_ACUITY_SCORE: 36

## 2020-06-21 ASSESSMENT — MIFFLIN-ST. JEOR: SCORE: 1622.75

## 2020-06-21 NOTE — PROGRESS NOTES
[facility] [place]    Progress Note - Juliana 1 Service        Date of Admission:  6/12/2020    Assessment & Plan   Abhijeet Mccauley is a 59 year old male with a history of alcohol use disorder, alcohol withdrawal seizures, EtOH cirrhosis, COPD, ulcerative colitis, CKD III, AML (diagnosed 2008, s/p chemotherapy), and s/p left hip replacement ~2 weeks prior to admission admitted on 6/12/2020 after multiple falls c/b right 3-8 rib fractures with concern for alcohol withdrawal and seizures.     CHANGES TODAY  - Plan for paracentesis tomorrow with IR  - Increase free water flushes to 120 mL Q4H  - Patient having small, dark red, jelly-like clots in stool; suspect related to recent rectal tube. Will monitor closely and recheck hemoglobin tonight.   - Called sister to ask for hearing aids and dentures- she lives too far away and thus is unable to bring these. When he goes to a TCU, she will bring there.   - Discussed his enterobacter cloaceae with micro lab. To test for ESBL, they run several tests for resistance markers associated with ESBL, including NDM, CTXM, KPC, and VIM. All of these were negative on his blood sample.     Acute hypoxic respiratory failure, improving   Pulmonary edema  Now extubated and weaned to room air. Weight today 87.5 kg, up from admission of 84 kg. Mild crackles present on pulmonary exam. Echo 5/21 with EF 55-60%, trace to mild tricuspid insufficiency, normal IVC with preserved respiratory variability. He does have known ascites and had been on furosemide 20 mg oral daily prior to admission, which was held during his ICU admission in the setting of sepsis.   - Discontinue IV lasix and start home regimen of 20 mg oral lasix daily  - Daily BMP, Mg  - Incentive spirometry  - Daily weights    Moderate to large ascites   Cirrhosis  No evidence of SBP on exam. Reported history of esophageal varices, but none documented on last EGD in February.   - Diagnostic and  therapeutic paracentesis tomorrow  - If concern for worsening encephalopathy, would consider adding empiric lactulose  - Continue rifaximin BID  - Continue spironolactone 50 mg daily  - Continue propranalol    Hypernatremia  - Increase free water flushes to 120 mL Q4H    Bright red blood per rectum  Hemodynamically stable. Possibly from rectal tube. Continue to monitor.   - If hemodynamics become unstable, significant hemoglobin drop, or increased output, would trend hemoglobin more closely, make NPO, and discuss with GI    Urinary tract infection and bacteremia (enterobacter cloacae)  Left upper pole obstructing stone  S/p left ureteral stent placement on 06/13 by Dr. Lino. Definitive stone treatment per urology once acute illness resolves.   - Continue ceftriaxone for a 14 day course through 6/26 - could consider transition to oral tomorrow  - Patient has been scheduled for office visit with Dr. Hale in ~1 month, sister aware  - Please page urology prior to discharge to discuss plan with patient in person    Metabolic encephalopathy  Concern for seizures  Awake and alert, but still quite confused, slightly improved from yesterday. Neurology consulted previously for seizures and altered mental status--they felt seizures were provoked by a combination of alcohol withdrawal, sepsis, and metabolic derangements. Thus, no anti-seizure medication was initiated. vEEG completed 6/14 without subclinical seizures. Encephalopathy most likely multifactorial in the setting of infection, withdrawal, and ICU delirium. There could also be an element of hepatic encephalopathy, particularly given asterixis on exam. Ammonia was within normal limits.   - OT evaluation    Rib fractures  - Tylenol  650 mg Q8H (2g max in the setting of cirrhosis)  - Lidocaine patches  - Oxycodone 5 mg Q4H PRN  - Continue home baclofen 5 mg TID (reduced from home dose of 10 mg TID)  - Continue home gabapentin 600 mg TID  - PT and OT  evaluations    Alcohol withdrawal  Alcohol use disorder  - Folic acid and thiamine supplementation  - S/p ativan protocol which was discontinued on 6/18    Genital lesions  See images under misc tab. Patient denies significant pain. Suspect pressure wound related to prior edema and possible component of candida/intertrigo. Syphilis testing negative. Gonorrhea and chlamydia negative. HSV pending.   - [facility] nurse consult    Acute kidney injury, resolved  Chronic kidney disease stage III  Likely secondary to septic shock. Unclear baseline in times of wellness, but appears to be ~1.7. Now improved.  - Avoid nephrotoxins    CHRONIC  Acute on chronic macrocytic anemia- Combination of critical illness, liver disease, alcohol use, and recent blood loss.  S/p knee replacement- Per note from 5/30, recommended 81 mg daily for 42 days (through 7/11)   COPD- continue breo ellipta, singulair, PRN albuterol  Hyperlipidemia- Continue home atorvastatin  Generalized anxiety disorder- Continue home bupropion; holding trazadone and hydroxyzine  Tobacco use- Continue nicotine patch  Gastritis- continue protonix BID     Diet: Adult Formula Drip Feeding: Continuous Impact Peptide 1.5; Nasoduodenal tube; Goal Rate: 50; mL/hr; Medication - Feeding Tube Flush Frequency: At least 15-30 mL water before and after medication administration and with tube clogging; Amount to Sen...  Full Liquid Diet Honey Thickened Liquids (pre-thickened or use instant food thickener)    Fluids: None  Lines: PIV, NG, urinary catheter for wound healing  DVT Prophylaxis: Heparin SQ  Chong Catheter: in place, indication: Strict 1-2 Hour I&O, Strict 1-2 Hour I&O, Wound Healing  Code Status: Full Code           Disposition Plan   Expected discharge: 2 - 3 days, recommended to pending PT/OT evaluation once safe disposition plan/ TCU bed available.  Entered: Flora Alejo MD 06/21/2020, 6:21 AM       This patient was staffed with Dr. Jama.     Flora Block  "MD Juliana Alejo 1 Service  [facility], [place]  Pager: [phone]  Please see sticky note for cross cover information  ______________________________________________________________________    Interval History   No acute events overnight. Per nursing notes, continues to be intermittently delirious, including intermittent hallucinations (saw kittens on the walls). Desaturates to the 80s with movement. This morning he says he feels \"celso shitty\". He doesn't like the diet he has in the hospital. He has some ongoing chest pain at the site of his rib fractures. Shortness of breath is stable. No abdominal pain.     Data reviewed today: I reviewed all medications, new labs and imaging results over the last 24 hours.     Physical Exam   Vital Signs: Temp: 97  F (36.1  C) Temp src: Axillary BP: 126/56 Pulse: 83 Heart Rate: 75 Resp: 18 SpO2: 95 % O2 Device: None (Room air)    Weight: 195 lbs 8.77 oz  Constitutional: Awake and alert. Comfortable-appearing, laying in bed.    Eyes: No conjunctival injection or scleral icterus.   ENT: Scattered punctate erythematous lesions on lips.   Respiratory: Saturating well on room air. Mild crackles bilaterally. No wheezing.   Cardiovascular: RRR. Distant heart sounds. Normal S1/S2. No murmurs appreciated.   GI: Distended. Soft. Nontender.   Musculoskeletal: No significant lower extremity edema.   Neurologic: Oriented to self and place. Knows month and close on day, but not year. No facial asymmetry. Moves all extremities appropriately.     Data   Recent Labs   Lab 06/20/20  1138 06/19/20  0433 06/18/20  0339 06/17/20  0358   WBC 15.9* 14.7* 14.2* 17.8*   HGB 7.8* 7.3* 8.4* 8.3*   * 106* 105* 104*   * 100* 93* 89*    145* 145* 144   POTASSIUM 4.4 4.9 4.4 3.9   CHLORIDE 114* 118* 117* 117*   CO2 25 24 23 20   BUN 57* 52* 46* 36*   CR 1.46* 1.38* 1.42* 1.48*   ANIONGAP 6 3 5 7   BEE 8.6 8.7 8.6 8.2*   * 142* 136* 133*   ALBUMIN " 1.9*  1.9*  --   --  1.8*   PROTTOTAL 6.0*  6.0*  --   --  5.2*   BILITOTAL 1.3  1.2  --   --  1.7*   ALKPHOS 746*  738*  --   --  561*   ALT 28  29  --   --  22   AST 53*  51*  --   --  42     Recent Results (from the past 24 hour(s))   XR Chest Port 1 View    Narrative    Exam: XR CHEST PORT 1 VW, 6/20/2020 7:31 AM    Indication: Evidence of edema?    Comparison: 6/14/2020.    Findings: Single portable chest radiograph. Interval extubation and  removal of right central venous catheter. Enteric tube projecting over  the esophagus, coiled in the stomach, tip off the field of view.  Cardiomediastinal silhouette is enlarged. Low lung volumes. Diffuse  patchy interstitial opacities. Pulmonary vascularity is indistinct. No  large pleural effusions. Multiple posterior rib fractures.      Impression    Impression:   1. Bilateral interstitial opacities in a pattern suggestive of  pulmonary edema.  2. Stable enlargement of the cardiomediastinal silhouette.  3. Low lung volumes.    I have personally reviewed the examination and initial interpretation  and I agree with the findings.    MELVIN BUSTAMANTE MD   US Abdomen Limited w Abd/Pelvis Duplex Complete    Narrative    EXAMINATION: US ABDOMEN COMPLETE WITH DOPPLER, 6/20/2020 2:43 PM     COMPARISON: Abdominal ultrasound 12/31/2009 HISTORY:    TECHNIQUE: The abdomen was scanned in standard fashion with  specialized ultrasound transducer(s) using both gray-scale, color  Doppler, and spectral flow techniques.    Findings:    Liver: Cirrhotic morphology without definite hepatic mass.     Extrahepatic portal vein flow is retrograde, measuring 19 cm/sec.  Right portal vein flow is retrograde, measuring 22 cm/sec.  Left portal vein flow is retrograde, measuring 25 cm/sec.      Flow in the hepatic artery is towards the liver and:  181 cm/sec peak systolic  0.76 resistive index.     The splenic vein is obscured.  The left, middle, and right hepatic  veins are patent with  flow towards the IVC. The IVC is patent with  flow towards the heart.   The visualized aorta is not dilated.    Gallbladder: The gallbladder is minimally dilated. No wall thickening,  pericholecystic fluid, positive sonographic Ortiz's sign or evidence  for cholelithiasis    Bile Ducts: No evidence of intrahepatic biliary dilatation. The  extrahepatic common hepatic/bile ducts were not visualized.    Pancreas: The pancreatic head was not well visualized.    Kidneys: Right kidney demonstrates echotexture, without mass or  hydronephrosis, measuring 10.2 cm.    Fluid: Moderate to large amount of abdominal ascites.      Impression    Impression:   1. Cirrhotic morphology of the liver with sequela of portal  hypertension including retrograde flow in the portal system and  abdominal ascites. No suspicious hepatic lesion identified.  2. Gallbladder is nearly decompressed without evidence of acute  cholecystitis. No intrahepatic biliary dilation. The common bile duct  could not be visualized.    I have personally reviewed the examination and initial interpretation  and I agree with the findings.    TRISTIAN HERNANDEZ, DO

## 2020-06-21 NOTE — PLAN OF CARE
6A PT -   Discharge Planner PT   Patient plan for discharge: patient states that he wants to be sure of medical prognosis before deciding if he would like to go to rehab  Current status: patient highly motivated to participate in therapy but with limited endurance. Patient performed supine>sit with mod A and sit<>stand with mod A to FWW. Patient performed x2 stand pivot transfers before seated rest break. Patient also ambulated 2x20' in hallway with UE support on FWW and min A throughout, x1 bout mod A due to LE fatigue. Barriers to return to prior living situation: medical status, impaired functional mobility  Recommendations for discharge: TCU  Rationale for recommendations: Patient will benefit from skilled PT in TCU setting to address functional mobility deficits for eventual safe return home.  Entered by: Austin Maxwell 06/21/2020 2:18 PM

## 2020-06-21 NOTE — PROGRESS NOTES
Status: s/p left hip replacement ~2 weeks prior to admission admitted on 6/12/2020 after multiple falls c/b right 3-8 rib fractures with concern for alcohol withdrawal and seizures.(per MD note).   Vitals: VSS/A on RA. Sats 94%  Neuros:  Alert. Oriented to self.  Pupils equal and reactive.  Follows commands.  Strength equal bilaterally.  Verbally aggressive at times.  Intermittently delirious.  Intermittent hallucinations (for example, saw kittens on the walls).  Restless at times. BUE 5/5. BLE 4/5. N/T in both feet  IV: L PIV S/L  Resp/trach: WDL. Intermittent productive cough. Desats with repositions.   Diet: Full liquid with honey thick liq.   (Impact Peptide 1.5) TF at goal of 50mL/hr via NJ.   Bowel status: BM x3 (loose, brown)  : Chong in place for wound healing.  Elyssa in color.    Skin: Bruising throughout.  L) hip incision DAPHNE, slightly reddened.  Penis open ulcer.   Pain: denies  Activity: A2, GB, walker, mech lift.  Turned/repositioned Q2hrs. Plan: Continue with current POC.      0630:  TF stopped, d/t stomach ache. AM Protonix dose given.

## 2020-06-21 NOTE — CONSULTS
INTERVENTIONAL RADIOLOGY CONSULT NOTE    Reason for referral:    Paracentesis    History:   59-year-old patient with alcoholic cirrhosis, chronic kidney disease, AML, hip fracture 6/12/2020, and ascites.  Diagnostic and therapeutic paracentesis requested; patient is encephalopathic with possible seizures due to alcohol withdrawal.    Labs:  Lab Results   Component Value Date    HGB 7.9 06/21/2020     Lab Results   Component Value Date     06/21/2020     Lab Results   Component Value Date    WBC 17.0 06/21/2020       Lab Results   Component Value Date    INR 1.44 06/21/2020       Lab Results   Component Value Date    PROTTOTAL 6.4 06/21/2020      Lab Results   Component Value Date    ALBUMIN 2.0 06/21/2020     Lab Results   Component Value Date    BILITOTAL 1.1 06/21/2020     Lab Results   Component Value Date    BILICONJ 0.0 12/31/2009      Lab Results   Component Value Date    ALKPHOS 796 06/21/2020     Lab Results   Component Value Date    AST 59 06/21/2020     Lab Results   Component Value Date    ALT 37 06/21/2020       Lab Results   Component Value Date    CR 1.46 06/21/2020     Lab Results   Component Value Date    BUN 62 06/21/2020       Imaging:   Ultrasound abdomen 6/20/2020 shows small to moderate nonloculated ascites, also seen on CT 6/14/2020.    Assessment/Plan:   Patient was placed on IR schedule on Monday for diagnostic and therapeutic paracentesis.  Referring team is aware.

## 2020-06-21 NOTE — PLAN OF CARE
Status: Pt on 6A s/p L hip replacement ~2 weeks prior to admission. Admitted on 6/12/2020 after multiple falls, sustaining 3-8 R rib fractures with concern for alcohol withdrawal and seizures.  Vitals: VSS ex HTN within parameters and intermittent O2 drop (see respiratory).   Neuros: Alert and oriented x4 this shift. Intermittently delirious, two visual hallucinations witnessed this shift. Restless at times. BUE 5/5. BLE 4/5. N/T in both feet.  IV: 2 PIV; one TKO, one SL.   Resp/trach: Pt desats to 80s when turning, but once he is on his back again, his O2 sats are in the 90s. Expiratory wheezes present, albuterol neb given by RT x1. Intermittent productive cough, sputum sample sent but not enough for a culture, no new orders.   Diet: Full liquid diet with honey-thickened liquids. Fair intake. TF at goal rate of 50 mL/hr.  Bowel status: BS+. One incontinent BM this shift.   : Chong in place for wound healing. Cares completed.  Skin: L hip incision with mild erythema. Wound on urethral meatus of penis, WOC consulted. Slough present with some discharge.  Pain: Pt reported rib pain intermittently. Lidocaine patch in place.  Activity: Assist of two and lift. Not OOB this shift. Turn/repo q2h.   Plan: Continue to monitor and follow plan of care.

## 2020-06-21 NOTE — PLAN OF CARE
Discharge Planner SLP   Patient plan for discharge: not discussed  Current status: The patient was seen for dysphagia tx this AM. Intake amount is limited by reduced appetite and positioning. Positive signs/symptoms of aspiration persist with advanced trials. Continue to recommend a honey thick consistency full liquid diet (small sips by cup or spoon, no straws). SLP is following for dysphagia tx.   Barriers to return to prior living situation: medical status, weakness, dysphagia, TF, modified diet  Recommendations for discharge: TCU  Rationale for recommendations: currently benefits from SLP intervention for increased aspiration risk and below baseline swallowing skills       Entered by: Yadi Auguste 06/21/2020 11:53 AM

## 2020-06-21 NOTE — PLAN OF CARE
AVSS, tolerating thivkened liquids, also Impact Peptide tube feeding 50/hr. Having loose stools, this afternoon team shown sample of red blood streaked and small clots in stool.Chong volume increased after mid am IV Lasix. Per team may have paracentesis tomorrow, abdomen round, semi firm. Up with rehab this afternoon, able to manage walker and walk in martines with 2 assist, now resting in recliner.

## 2020-06-22 ENCOUNTER — APPOINTMENT (OUTPATIENT)
Dept: MRI IMAGING | Facility: CLINIC | Age: 60
End: 2020-06-22
Payer: COMMERCIAL

## 2020-06-22 ENCOUNTER — APPOINTMENT (OUTPATIENT)
Dept: PHYSICAL THERAPY | Facility: CLINIC | Age: 60
End: 2020-06-22
Payer: COMMERCIAL

## 2020-06-22 ENCOUNTER — APPOINTMENT (OUTPATIENT)
Dept: INTERVENTIONAL RADIOLOGY/VASCULAR | Facility: CLINIC | Age: 60
End: 2020-06-22
Attending: PHYSICIAN ASSISTANT
Payer: COMMERCIAL

## 2020-06-22 ENCOUNTER — APPOINTMENT (OUTPATIENT)
Dept: OCCUPATIONAL THERAPY | Facility: CLINIC | Age: 60
End: 2020-06-22
Attending: STUDENT IN AN ORGANIZED HEALTH CARE EDUCATION/TRAINING PROGRAM
Payer: COMMERCIAL

## 2020-06-22 LAB
ALBUMIN FLD-MCNC: 0.3 G/DL
ALBUMIN SERPL-MCNC: 2 G/DL (ref 3.4–5)
ALP SERPL-CCNC: 753 U/L (ref 40–150)
ALT SERPL W P-5'-P-CCNC: 51 U/L (ref 0–70)
ANION GAP SERPL CALCULATED.3IONS-SCNC: 5 MMOL/L (ref 3–14)
APPEARANCE FLD: CLEAR
AST SERPL W P-5'-P-CCNC: 80 U/L (ref 0–45)
BASOPHILS # BLD AUTO: 0.1 10E9/L (ref 0–0.2)
BASOPHILS NFR BLD AUTO: 0.6 %
BILIRUB SERPL-MCNC: 0.9 MG/DL (ref 0.2–1.3)
BUN SERPL-MCNC: 57 MG/DL (ref 7–30)
CALCIUM SERPL-MCNC: 8.7 MG/DL (ref 8.5–10.1)
CHLORIDE SERPL-SCNC: 109 MMOL/L (ref 94–109)
CO2 SERPL-SCNC: 28 MMOL/L (ref 20–32)
COLOR FLD: YELLOW
CREAT SERPL-MCNC: 1.49 MG/DL (ref 0.66–1.25)
CRP SERPL-MCNC: 67 MG/L (ref 0–8)
DIFFERENTIAL METHOD BLD: ABNORMAL
EOSINOPHIL # BLD AUTO: 0.7 10E9/L (ref 0–0.7)
EOSINOPHIL NFR BLD AUTO: 3.8 %
ERYTHROCYTE [DISTWIDTH] IN BLOOD BY AUTOMATED COUNT: ABNORMAL % (ref 10–15)
GFR SERPL CREATININE-BSD FRML MDRD: 50 ML/MIN/{1.73_M2}
GLUCOSE SERPL-MCNC: 104 MG/DL (ref 70–99)
GRAM STN SPEC: NORMAL
HCT VFR BLD AUTO: 25.8 % (ref 40–53)
HGB BLD-MCNC: 7.7 G/DL (ref 13.3–17.7)
HSV1 DNA SPEC QL NAA+PROBE: NEGATIVE
HSV2 DNA SPEC QL NAA+PROBE: NEGATIVE
IMM GRANULOCYTES # BLD: 0.6 10E9/L (ref 0–0.4)
IMM GRANULOCYTES NFR BLD: 3.5 %
LYMPHOCYTES # BLD AUTO: 1.7 10E9/L (ref 0.8–5.3)
LYMPHOCYTES NFR BLD AUTO: 9.8 %
LYMPHOCYTES NFR FLD MANUAL: 35 %
MAGNESIUM SERPL-MCNC: 2 MG/DL (ref 1.6–2.3)
MCH RBC QN AUTO: 31.3 PG (ref 26.5–33)
MCHC RBC AUTO-ENTMCNC: 29.8 G/DL (ref 31.5–36.5)
MCV RBC AUTO: 105 FL (ref 78–100)
MONOCYTES # BLD AUTO: 2.6 10E9/L (ref 0–1.3)
MONOCYTES NFR BLD AUTO: 15.2 %
MONOS+MACROS NFR FLD MANUAL: 41 %
NEUTROPHILS # BLD AUTO: 11.6 10E9/L (ref 1.6–8.3)
NEUTROPHILS NFR BLD AUTO: 67.1 %
NEUTS BAND NFR FLD MANUAL: 24 %
NRBC # BLD AUTO: 0 10*3/UL
NRBC BLD AUTO-RTO: 0 /100
PHOSPHATE SERPL-MCNC: 3.4 MG/DL (ref 2.5–4.5)
PLATELET # BLD AUTO: 164 10E9/L (ref 150–450)
POTASSIUM SERPL-SCNC: 4 MMOL/L (ref 3.4–5.3)
PROCALCITONIN SERPL-MCNC: 2.42 NG/ML
PROT FLD-MCNC: 0.7 G/DL
PROT SERPL-MCNC: 6.1 G/DL (ref 6.8–8.8)
RBC # BLD AUTO: 2.46 10E12/L (ref 4.4–5.9)
SODIUM SERPL-SCNC: 141 MMOL/L (ref 133–144)
SPECIMEN SOURCE FLD: NORMAL
SPECIMEN SOURCE: NORMAL
SPECIMEN SOURCE: NORMAL
WBC # BLD AUTO: 17.2 10E9/L (ref 4–11)
WBC # FLD AUTO: 171 /UL

## 2020-06-22 PROCEDURE — 74183 MRI ABD W/O CNTR FLWD CNTR: CPT

## 2020-06-22 PROCEDURE — 97165 OT EVAL LOW COMPLEX 30 MIN: CPT | Mod: GO

## 2020-06-22 PROCEDURE — 25000132 ZZH RX MED GY IP 250 OP 250 PS 637: Performed by: NURSE PRACTITIONER

## 2020-06-22 PROCEDURE — 86140 C-REACTIVE PROTEIN: CPT | Performed by: NURSE PRACTITIONER

## 2020-06-22 PROCEDURE — 87015 SPECIMEN INFECT AGNT CONCNTJ: CPT | Performed by: STUDENT IN AN ORGANIZED HEALTH CARE EDUCATION/TRAINING PROGRAM

## 2020-06-22 PROCEDURE — 27210732 IR PARACENTESIS

## 2020-06-22 PROCEDURE — 25000132 ZZH RX MED GY IP 250 OP 250 PS 637: Performed by: INTERNAL MEDICINE

## 2020-06-22 PROCEDURE — 36415 COLL VENOUS BLD VENIPUNCTURE: CPT | Performed by: STUDENT IN AN ORGANIZED HEALTH CARE EDUCATION/TRAINING PROGRAM

## 2020-06-22 PROCEDURE — 25000132 ZZH RX MED GY IP 250 OP 250 PS 637: Performed by: STUDENT IN AN ORGANIZED HEALTH CARE EDUCATION/TRAINING PROGRAM

## 2020-06-22 PROCEDURE — 84145 PROCALCITONIN (PCT): CPT | Performed by: STUDENT IN AN ORGANIZED HEALTH CARE EDUCATION/TRAINING PROGRAM

## 2020-06-22 PROCEDURE — 97110 THERAPEUTIC EXERCISES: CPT | Mod: GP

## 2020-06-22 PROCEDURE — 85025 COMPLETE CBC W/AUTO DIFF WBC: CPT | Performed by: STUDENT IN AN ORGANIZED HEALTH CARE EDUCATION/TRAINING PROGRAM

## 2020-06-22 PROCEDURE — 87070 CULTURE OTHR SPECIMN AEROBIC: CPT | Performed by: STUDENT IN AN ORGANIZED HEALTH CARE EDUCATION/TRAINING PROGRAM

## 2020-06-22 PROCEDURE — A9585 GADOBUTROL INJECTION: HCPCS | Performed by: STUDENT IN AN ORGANIZED HEALTH CARE EDUCATION/TRAINING PROGRAM

## 2020-06-22 PROCEDURE — 87186 SC STD MICRODIL/AGAR DIL: CPT | Performed by: STUDENT IN AN ORGANIZED HEALTH CARE EDUCATION/TRAINING PROGRAM

## 2020-06-22 PROCEDURE — 89051 BODY FLUID CELL COUNT: CPT | Performed by: STUDENT IN AN ORGANIZED HEALTH CARE EDUCATION/TRAINING PROGRAM

## 2020-06-22 PROCEDURE — 82042 OTHER SOURCE ALBUMIN QUAN EA: CPT | Performed by: STUDENT IN AN ORGANIZED HEALTH CARE EDUCATION/TRAINING PROGRAM

## 2020-06-22 PROCEDURE — 25500064 ZZH RX 255 OP 636: Performed by: STUDENT IN AN ORGANIZED HEALTH CARE EDUCATION/TRAINING PROGRAM

## 2020-06-22 PROCEDURE — 83735 ASSAY OF MAGNESIUM: CPT | Performed by: STUDENT IN AN ORGANIZED HEALTH CARE EDUCATION/TRAINING PROGRAM

## 2020-06-22 PROCEDURE — 12000001 ZZH R&B MED SURG/OB UMMC

## 2020-06-22 PROCEDURE — 86140 C-REACTIVE PROTEIN: CPT | Performed by: STUDENT IN AN ORGANIZED HEALTH CARE EDUCATION/TRAINING PROGRAM

## 2020-06-22 PROCEDURE — 25000128 H RX IP 250 OP 636: Performed by: STUDENT IN AN ORGANIZED HEALTH CARE EDUCATION/TRAINING PROGRAM

## 2020-06-22 PROCEDURE — 87205 SMEAR GRAM STAIN: CPT | Performed by: STUDENT IN AN ORGANIZED HEALTH CARE EDUCATION/TRAINING PROGRAM

## 2020-06-22 PROCEDURE — 87077 CULTURE AEROBIC IDENTIFY: CPT | Performed by: STUDENT IN AN ORGANIZED HEALTH CARE EDUCATION/TRAINING PROGRAM

## 2020-06-22 PROCEDURE — 80053 COMPREHEN METABOLIC PANEL: CPT | Performed by: STUDENT IN AN ORGANIZED HEALTH CARE EDUCATION/TRAINING PROGRAM

## 2020-06-22 PROCEDURE — 99233 SBSQ HOSP IP/OBS HIGH 50: CPT | Mod: GC | Performed by: STUDENT IN AN ORGANIZED HEALTH CARE EDUCATION/TRAINING PROGRAM

## 2020-06-22 PROCEDURE — 84157 ASSAY OF PROTEIN OTHER: CPT | Performed by: STUDENT IN AN ORGANIZED HEALTH CARE EDUCATION/TRAINING PROGRAM

## 2020-06-22 PROCEDURE — 25000125 ZZHC RX 250: Performed by: STUDENT IN AN ORGANIZED HEALTH CARE EDUCATION/TRAINING PROGRAM

## 2020-06-22 PROCEDURE — 84100 ASSAY OF PHOSPHORUS: CPT | Performed by: STUDENT IN AN ORGANIZED HEALTH CARE EDUCATION/TRAINING PROGRAM

## 2020-06-22 PROCEDURE — 0W9G3ZZ DRAINAGE OF PERITONEAL CAVITY, PERCUTANEOUS APPROACH: ICD-10-PCS | Performed by: RADIOLOGY

## 2020-06-22 PROCEDURE — 97535 SELF CARE MNGMENT TRAINING: CPT | Mod: GO

## 2020-06-22 PROCEDURE — 25000128 H RX IP 250 OP 636: Performed by: NURSE PRACTITIONER

## 2020-06-22 RX ORDER — GADOBUTROL 604.72 MG/ML
10 INJECTION INTRAVENOUS ONCE
Status: COMPLETED | OUTPATIENT
Start: 2020-06-22 | End: 2020-06-22

## 2020-06-22 RX ORDER — LIDOCAINE HYDROCHLORIDE 10 MG/ML
1-30 INJECTION, SOLUTION EPIDURAL; INFILTRATION; INTRACAUDAL; PERINEURAL
Status: COMPLETED | OUTPATIENT
Start: 2020-06-22 | End: 2020-06-22

## 2020-06-22 RX ORDER — LIDOCAINE 40 MG/G
CREAM TOPICAL
Status: DISCONTINUED | OUTPATIENT
Start: 2020-06-22 | End: 2020-06-25 | Stop reason: HOSPADM

## 2020-06-22 RX ORDER — HEPARIN SODIUM,PORCINE 10 UNIT/ML
2-5 VIAL (ML) INTRAVENOUS
Status: DISCONTINUED | OUTPATIENT
Start: 2020-06-22 | End: 2020-06-25 | Stop reason: HOSPADM

## 2020-06-22 RX ADMIN — Medication 40 MG: at 08:39

## 2020-06-22 RX ADMIN — CEFEPIME 2 G: 2 INJECTION, POWDER, FOR SOLUTION INTRAVENOUS at 09:40

## 2020-06-22 RX ADMIN — GABAPENTIN 600 MG: 300 CAPSULE ORAL at 14:37

## 2020-06-22 RX ADMIN — DIBASIC SODIUM PHOSPHATE, MONOBASIC POTASSIUM PHOSPHATE AND MONOBASIC SODIUM PHOSPHATE 500 MG: 852; 155; 130 TABLET ORAL at 20:39

## 2020-06-22 RX ADMIN — Medication 40 MG: at 15:34

## 2020-06-22 RX ADMIN — Medication 5000 UNITS: at 09:41

## 2020-06-22 RX ADMIN — LIDOCAINE HYDROCHLORIDE 2 ML: 10 INJECTION, SOLUTION EPIDURAL; INFILTRATION; INTRACAUDAL; PERINEURAL at 10:28

## 2020-06-22 RX ADMIN — ACETAMINOPHEN 650 MG: 325 TABLET, FILM COATED ORAL at 08:39

## 2020-06-22 RX ADMIN — RIFAXIMIN 550 MG: 550 TABLET ORAL at 08:39

## 2020-06-22 RX ADMIN — BUPROPION HYDROCHLORIDE 100 MG: 100 TABLET, FILM COATED ORAL at 22:03

## 2020-06-22 RX ADMIN — Medication 1 PACKET: at 08:53

## 2020-06-22 RX ADMIN — THIAMINE HCL TAB 100 MG 100 MG: 100 TAB at 08:39

## 2020-06-22 RX ADMIN — MONTELUKAST 10 MG: 10 TABLET, FILM COATED ORAL at 22:03

## 2020-06-22 RX ADMIN — SPIRONOLACTONE 50 MG: 25 TABLET ORAL at 08:39

## 2020-06-22 RX ADMIN — ACETAMINOPHEN 650 MG: 325 TABLET, FILM COATED ORAL at 15:34

## 2020-06-22 RX ADMIN — PROPRANOLOL HYDROCHLORIDE 20 MG: 10 TABLET ORAL at 20:39

## 2020-06-22 RX ADMIN — GABAPENTIN 600 MG: 300 CAPSULE ORAL at 20:39

## 2020-06-22 RX ADMIN — Medication 1 PACKET: at 14:38

## 2020-06-22 RX ADMIN — DIBASIC SODIUM PHOSPHATE, MONOBASIC POTASSIUM PHOSPHATE AND MONOBASIC SODIUM PHOSPHATE 500 MG: 852; 155; 130 TABLET ORAL at 08:38

## 2020-06-22 RX ADMIN — Medication 5 MG: at 08:39

## 2020-06-22 RX ADMIN — RIFAXIMIN 550 MG: 550 TABLET ORAL at 20:39

## 2020-06-22 RX ADMIN — LIDOCAINE 1 PATCH: 560 PATCH PERCUTANEOUS; TOPICAL; TRANSDERMAL at 14:37

## 2020-06-22 RX ADMIN — Medication 5 MG: at 20:39

## 2020-06-22 RX ADMIN — BUPROPION HYDROCHLORIDE 100 MG: 100 TABLET, FILM COATED ORAL at 14:37

## 2020-06-22 RX ADMIN — ASPIRIN 81 MG CHEWABLE TABLET 81 MG: 81 TABLET CHEWABLE at 08:39

## 2020-06-22 RX ADMIN — BUPROPION HYDROCHLORIDE 100 MG: 100 TABLET, FILM COATED ORAL at 06:19

## 2020-06-22 RX ADMIN — Medication 1 PACKET: at 20:45

## 2020-06-22 RX ADMIN — FUROSEMIDE 20 MG: 20 TABLET ORAL at 08:38

## 2020-06-22 RX ADMIN — PROPRANOLOL HYDROCHLORIDE 20 MG: 10 TABLET ORAL at 08:39

## 2020-06-22 RX ADMIN — GABAPENTIN 600 MG: 300 CAPSULE ORAL at 08:39

## 2020-06-22 RX ADMIN — FLUTICASONE FUROATE AND VILANTEROL TRIFENATATE 1 PUFF: 200; 25 POWDER RESPIRATORY (INHALATION) at 08:52

## 2020-06-22 RX ADMIN — Medication 5000 UNITS: at 03:08

## 2020-06-22 RX ADMIN — Medication 5 MG: at 14:37

## 2020-06-22 RX ADMIN — Medication 5000 UNITS: at 17:23

## 2020-06-22 RX ADMIN — ATORVASTATIN CALCIUM 20 MG: 20 TABLET, FILM COATED ORAL at 08:39

## 2020-06-22 RX ADMIN — FOLIC ACID 1 MG: 1 TABLET ORAL at 08:38

## 2020-06-22 RX ADMIN — NICOTINE 1 PATCH: 14 PATCH, EXTENDED RELEASE TRANSDERMAL at 08:54

## 2020-06-22 RX ADMIN — GADOBUTROL 10 ML: 604.72 INJECTION INTRAVENOUS at 16:46

## 2020-06-22 RX ADMIN — CEFEPIME 2 G: 2 INJECTION, POWDER, FOR SOLUTION INTRAVENOUS at 20:39

## 2020-06-22 RX ADMIN — MULTIVITAMIN 15 ML: LIQUID ORAL at 08:52

## 2020-06-22 ASSESSMENT — ACTIVITIES OF DAILY LIVING (ADL)
PREVIOUS_RESPONSIBILITIES: MEDICATION MANAGEMENT
ADLS_ACUITY_SCORE: 36
ADLS_ACUITY_SCORE: 23
ADLS_ACUITY_SCORE: 36
ADLS_ACUITY_SCORE: 23

## 2020-06-22 NOTE — PLAN OF CARE
Discharge Planner OT   Patient plan for discharge: TCU.  Current status: Initial evaluation and treatment session completed. The MoCA (Umer Cognitive Assessment) was completed to assess current level of of cognitive functioning. He does not have his reading glasses here in hospital and reported he can not see small print or pictures without them, therefore, the MoCA blind version was administered. Patient scored an 11/22 (18 and above is considered normal) with severe impairment in areas of attention, language, abstraction and delayed recall (only recalled 1/5 words correctly). Bed and chair alarms should continue to be utilized. Patient tearful at times when discussing his family and lack of social support.  Barriers to return to prior living situation: Medical status, cognition, strength, endurance.  Recommendations for discharge: TCU.  Rationale for recommendations: OT will continue to follow patient while in hospital to progress functional independence.       Entered by: Kristal Rome 06/22/2020 2:54 PM

## 2020-06-22 NOTE — PLAN OF CARE
Status: Pt on 6A s/p L hip replacement ~2 weeks prior to admission. Admitted on 6/12/2020 after multiple falls, sustaining 3-8 R rib fractures with concern for alcohol withdrawal and seizures.  Vitals: VSS on RA, ex intermittent O2 drop.  Neuros: Alert and oriented x4 this shift. No episodes of delirium.  BUE 5/5. BLE 4/5. N/T in both feet.  IV: PIV TKO.    Resp/trach:  LSD.  No respiratory distress.  Intermittent productive cough.   Diet: Full liquid diet with honey-thickened liquids. TF at goal rate of 50 mL/hr.  Bowel status: BS+. Large BM this shift.  No nausea.    : Chong in place for wound healing. Cares completed.  Skin: L hip incision with mild erythema. Wound on urethral meatus of penis, WOC consulted. Slough present, no discharge noted.  Pain:  No c/o pain over night.  C/o painful to swallow honey thick liquid, pt requesting to have juices ice chilled first before drink.    Activity: Assist of two and lift. Not OOB this shift. Turn/repo q2h.   Plan: Continue to monitor and follow plan of care.

## 2020-06-22 NOTE — PROGRESS NOTES
[facility], [place]    Progress Note - Juliana 1 Service        Date of Admission:  6/12/2020    Assessment & Plan   Abhijeet Mccauley is a 59 year old male with a history of alcohol use disorder, alcohol withdrawal seizures, EtOH cirrhosis, COPD, ulcerative colitis, CKD III, AML (diagnosed 2008, s/p chemotherapy), and s/p left hip replacement ~2 weeks prior to admission admitted on 6/12/2020 after multiple falls c/b right 3-8 rib fractures with concern for alcohol withdrawal and seizures.     CHANGES TODAY  - Paracentesis with IR today - 3.2L removed  - Switch back to cefepime from ceftriaxone for enterobacter and concern for inducible amp C resistance  - MOCA with OT  - MRCP for persistently elevated alkaline phosphatase and US without clear visualization of the common bile duct  - AM blood smear given persistent leukocytosis and history of AML    Elevated alkaline phosphatase  Moderate to large ascites   Cirrhosis  No evidence of SBP on exam. Obtaining diagnostic and therapeutic paracentesis today. Reported history of esophageal varices, but none documented on last EGD in February. The cause of his elevated alkaline phosphatase is unclear. Differential diagnosis includes infiltration (such as with AML), PBC, and drug induced liver injury. Ultrasound demonstrated cirrhosis changes, no suspicious hepatic lesions, gallbladder without evidence of cholecystitis, but the common bile duct was not visualized. Will obtain MRCP to rule out a biliary lesion.   - Diagnostic and therapeutic paracentesis today, labs pending  - MRCP, if no lesion identified, will consult gastroenterology for assistance with further workup of elevated alkaline phosphatase  - If concern for worsening encephalopathy, would consider adding empiric lactulose  - Continue rifaximin BID  - Continue spironolactone 50 mg daily  - Continue propranalol    Acute hypoxic respiratory failure, improving   Pulmonary edema  Now  extubated and weaned to room air. Echo 5/21 with EF 55-60%, trace to mild tricuspid insufficiency, normal IVC with preserved respiratory variability. He does have known ascites and had been on furosemide 20 mg oral daily prior to admission, which was held during his ICU admission in the setting of sepsis.   - 20 mg oral lasix daily  - Daily BMP, Mg  - Therapeutic paracentesis today will hopefully help with abdominal distension contributing to respiratory status  - Incentive spirometry  - Daily weights    Bright red blood per rectum  Hemodynamically stable. Possibly from rectal tube. Continue to monitor.   - If hemodynamics become unstable, significant hemoglobin drop, or increased output, would trend hemoglobin more closely, make NPO, and discuss with GI    Urinary tract infection and bacteremia (enterobacter cloacae)  Left upper pole obstructing stone  S/p left ureteral stent placement on 06/13 by Dr. Lino. Definitive stone treatment per urology once acute illness resolves. Being treated with cefepime. Briefly transitioned to ceftriaxone, but now back to cefepime. Unfortunately no options for oral de-escalation of antibiotic therapy given concern with inducible amp C resistance with 3rd generation cephalosporins and the bacteria's resistance to fluoroquinolones and bactrim.   - Discontinue ceftriaxone and switch back to cefepime for enterobacter and concern for inducible amp C resistance. Will need 14 day course of antibiotics, through 6/27.  - Will need PICC prior to discharge if course has not been completed  - Per urology, no role for continued antibiotics through intervention   - Patient has been scheduled for office visit with Dr. Hale in ~1 month, sister aware  - Please page urology prior to discharge to discuss plan with patient in person    Abx:   -- Cefepime (6/14-6/20, again 6/22-*)  -- Ceftriaxone (6/20-6/21)    Metabolic encephalopathy  Concern for seizures  Awake and alert, but still  intermittently confused. Neurology consulted previously for seizures and altered mental status--they felt seizures were provoked by a combination of alcohol withdrawal, sepsis, and metabolic derangements. Thus, no anti-seizure medication was initiated. vEEG completed 6/14 without subclinical seizures. Encephalopathy most likely multifactorial in the setting of infection, withdrawal, and ICU delirium. There could also be an element of hepatic encephalopathy, particularly given asterixis on exam. Ammonia was within normal limits.   - OT evaluation with MOCA    Rib fractures  - Tylenol 650 mg Q8H (2g max in the setting of cirrhosis)  - Lidocaine patches  - Oxycodone 5 mg Q4H PRN  - Continue home baclofen 5 mg TID (reduced from home dose of 10 mg TID)  - Continue home gabapentin 600 mg TID  - PT and OT evaluations    Alcohol withdrawal  Alcohol use disorder  - Folic acid and thiamine supplementation  - S/p ativan protocol which was discontinued on 6/18    Genital wound  See images under misc tab. Patient denies significant pain. Suspect pressure wound related to prior edema and possible component of candida/intertrigo. Syphilis testing negative. Gonorrhea and chlamydia negative. HSV 1/2 negative.   - WOC nurse consult  - Continue urinary catheter, will need this changed monthly, until these wounds heal. Last changed 6/20, will need to be changed next 7/20/2020    RESOLVED  Hypernatremia- Continue free water flushes to 120 mL Q4H  GENEVIEVE on CKD- Likely secondary to septic shock. Unclear baseline creatinine in times of wellness, but appears to be ~1.7    CHRONIC  Acute on chronic macrocytic anemia- Combination of critical illness, liver disease, alcohol use, and recent blood loss.  S/p knee replacement- Per note from 5/30, recommended 81 mg daily for 42 days (through 7/11)   COPD- continue breo ellipta, singulair, PRN albuterol  Hyperlipidemia- Continue home atorvastatin  Generalized anxiety disorder- Continue home  bupropion; holding trazadone and hydroxyzine  Tobacco use- Continue nicotine patch  Gastritis- continue protonix BID     Diet: Adult Formula Drip Feeding: Continuous Impact Peptide 1.5; Nasoduodenal tube; Goal Rate: 50; mL/hr; Medication - Feeding Tube Flush Frequency: At least 15-30 mL water before and after medication administration and with tube clogging; Amount to Sen...  Full Liquid Diet Honey Thickened Liquids (pre-thickened or use instant food thickener)    Fluids: None  Lines: PIV, NG, urinary catheter for wound healing  DVT Prophylaxis: Heparin SQ  Chong Catheter: in place, indication: Strict 1-2 Hour I&O, Strict 1-2 Hour I&O, Wound Healing  Code Status: Full Code           Disposition Plan   Expected discharge: 2 - 3 days, recommended to pending PT/OT evaluation once safe disposition plan/ TCU bed available.  Entered: Flora Alejo MD 06/22/2020, 12:56 PM       This patient was staffed with Dr. Jama.     Flora Alejo MD  [place] [address] Service  [facility], [place]  Pager: [phone]  Please see sticky note for cross cover information  ______________________________________________________________________    Interval History   No acute events overnight. Vital signs stable. No chest pain (apart from site of rib fractures). Shortness of breath is stable. No abdominal pain. No concerns.     Data reviewed today: I reviewed all medications, new labs and imaging results over the last 24 hours.     Physical Exam   Vital Signs: Temp: 98.2  F (36.8  C) Temp src: Oral BP: 124/79 Pulse: 77 Heart Rate: 76 Resp: 18 SpO2: 94 % O2 Device: None (Room air)    Weight: 192 lbs 14.44 oz  Constitutional: Awake and alert. Comfortable-appearing, laying in bed.    Eyes: No conjunctival injection or scleral icterus.   ENT: Scattered punctate erythematous lesions on lips, improving.   Respiratory: Saturating well on room air. Clear to auscultation bilaterally.   Cardiovascular: RRR.  Distant heart sounds. Normal S1/S2. No murmurs appreciated.   GI: Distended. Soft. Nontender.   Musculoskeletal: No significant lower extremity edema.   Neurologic: Alert and oriented x3. No facial asymmetry. Moves all extremities appropriately.     Data   Recent Labs   Lab 20  0552 20  1843 20  0643 20  1138   WBC 17.2*  --  17.0* 15.9*   HGB 7.7* 8.0* 7.9* 7.8*   *  --  105* 107*     --  150 134*   INR  --   --  1.44*  --      --  145* 144   POTASSIUM 4.0  --  4.0 4.4   CHLORIDE 109  --  112* 114*   CO2 28  --  25 25   BUN 57*  --  62* 57*   CR 1.49*  --  1.46* 1.46*   ANIONGAP 5  --  8 6   BEE 8.7  --  9.0 8.6   *  --  145* 152*   ALBUMIN 2.0*  --  2.0* 1.9*  1.9*   PROTTOTAL 6.1*  --  6.4* 6.0*  6.0*   BILITOTAL 0.9  --  1.1 1.3  1.2   ALKPHOS 753*  --  796* 746*  738*   ALT 51  --  37 28  29   AST 80*  --  59* 53*  51*     Recent Results (from the past 24 hour(s))   Echo Complete    Narrative    [id number]  [id number]  [id number]  [id number]^AARTI^RUSSELL^DARRELL           [facility],[place]  [facility]  [address] [place] [id number]     Name: ALVA MARISCAL  MRN: [id number]  : 1960  Study Date: 2020 01:01 PM  Age: 59 yrs  Gender: Male  Patient Location: formerly [facility]  Reason For Study: Heart Failure, Unspecified  Ordering Physician: RUSSELL BROUSSARD  Performed By: MINAL Wang     BSA: 2.0 m2  Height: 66 in  Weight: 195 lb  HR: 73  BP: 130/65 mmHg  _____________________________________________________________________________  __        Procedure  Complete Portable Echo Adult.  _____________________________________________________________________________  __        Interpretation Summary  Left ventricular size is normal. Mild concentric wall thickening consistent  with left ventricular hypertrophy is present. The Ejection Fraction is  estimated at 55-60%. No regional wall motion abnormalities  are seen.  Right ventricular function, chamber size, wall motion, and thickness are  normal.  Trace to mild tricuspid insufficiency is present. Pulmonary artery systolic  pressure is normal.  The inferior vena cava was normal in size with preserved respiratory  variability. No pericardial effusion is present.     There has been no significant change.  _____________________________________________________________________________  __        Left Ventricle  Left ventricular size is normal. Mild concentric wall thickening consistent  with left ventricular hypertrophy is present. Left ventricular diastolic  function is indeterminate. The Ejection Fraction is estimated at 55-60%. No  regional wall motion abnormalities are seen.     Right Ventricle  Right ventricular function, chamber size, wall motion, and thickness are  normal.     Atria  The right atria appears normal. Mild left atrial enlargement is present.     Mitral Valve  The mitral valve is normal. Trace mitral insufficiency is present.        Aortic Valve  Aortic valve is normal in structure and function. The aortic valve is  tricuspid. No aortic regurgitation is present.     Tricuspid Valve  The tricuspid valve is normal. Trace to mild tricuspid insufficiency is  present. The right ventricular systolic pressure is approximated at 27.5 mmHg  plus the right atrial pressure. Pulmonary artery systolic pressure is normal.     Pulmonic Valve  The pulmonic valve is normal.     Vessels  The aorta root is normal. The inferior vena cava was normal in size with  preserved respiratory variability.     Pericardium  No pericardial effusion is present.        Compared to Previous Study  There has been no change.  _____________________________________________________________________________  __  MMode/2D Measurements & Calculations     RVDd: 3.7 cm  IVSd: 1.3 cm  LVIDd: 4.9 cm  LVIDs: 3.4 cm  LVPWd: 1.3 cm  FS: 30.9 %  LV mass(C)d: 263.0 grams  LV mass(C)dI: 132.9 grams/m2  asc  Aorta Diam: 3.2 cm  LVOT diam: 2.0 cm  LVOT area: 3.2 cm2  LA Volume Index (BP): 42.3 ml/m2  RWT: 0.54  TAPSE: 1.7 cm           Doppler Measurements & Calculations  MV E max robi: 100.6 cm/sec  MV A max robi: 91.8 cm/sec  MV E/A: 1.1  MV dec slope: 630.2 cm/sec2  MV dec time: 0.16 sec  TV max P.5 mmHg  PA acc time: 0.07 sec  TR max robi: 262.4 cm/sec  TR max P.5 mmHg  E/E' av.4  Lateral E/e': 12.4  Medial E/e': 14.4     _____________________________________________________________________________  __           Report approved by: Delgado COBOS 2020 02:20 PM      IR Paracentesis    Narrative    PROCEDURES 2020:  1. Ultrasound guided paracentesis.    CLINICAL HISTORY: Ascites. Labs requested    Comparisons: US Abd 2020    Fellow: Fabrice Reaves MD    Staff Radiologist: MD MARIANN Blanc, BALJINDER BOWLING MD, attest that I was present for all critical portions  of the procedure and was immediately available to provide guidance and  assistance during the remainder of the procedure.    Attending face-to-face sedation time: No intravenous sedation  administered.    Medications: The patient was placed on continuous monitoring. No  intravenous sedation was administered. The patient remained stable  throughout the procedure.    PROCEDURE: Telephone consent had been previously obtained.    Right lower quadrant was prepped and draped in the usual sterile  fashion. 1% lidocaine without epinephrine was used for local  anesthesia. Under ultrasound guidance, a 5 [employer]  centesis needle catheter was advanced into the peritoneal space. Image  documenting catheter position and ascites was entered into the  patient's permanent record. 60 mL aspirated and sent for requested  laboratory analysis. Catheter to vacuum drainage. A total of 3200 mL  ascites aspirated. Catheter removed. Sterile dressing applied. No  immediate complication.      Impression    IMPRESSION: Ultrasound guided  paracentesis. 3200 mL ascites aspirated,  60 mL submitted for the requested laboratory studies.    I have personally reviewed the examination and initial interpretation  and I agree with the findings.    BALJINDER BOWLING MD

## 2020-06-22 NOTE — IR NOTE
Patient Name: Abhijeet Mccauley  Medical Record Number: [id number]  Today's Date: 6/22/2020    Procedure: Paracentesis  Proceduralist: Bismark Delgado MD and Fabrice Reaves MD    Procedure Start: 1026  Procedure end: 1035  Sedation medications administered: 1% lidocaine     Report given to: SOUTH Akers 6A  : KAIDEN    Other Notes: Pt arrived to IR room 7 from . Consent reviewed. Pt denies any questions or concerns regarding procedure. Pt positioned supine and monitored per protocol. Pt tolerated procedure without any noted complications. Pt transferred back to .    3,200 ml ascites fluid removed.  Labs sent.

## 2020-06-22 NOTE — PLAN OF CARE
SLP session cancelled: patient not available for tx upon 3 attempts today (in the IR for paracentesis, working with another, now having a procedure completed at bedside). Will reschedule for tomorrow.

## 2020-06-22 NOTE — PLAN OF CARE
Status: Pt on 6A s/p L hip replacement ~2 weeks prior to admission. Admitted on 6/12/2020 after multiple falls, sustaining 3-8 R rib fractures with concern for alcohol withdrawal and seizures.  Vitals: VSS ex intermittent O2 drop (see respiratory).   Neuros: Alert and oriented x4 this shift. Intermittently delirious. Restless at times. BUE 5/5. BLE 4/5. N/T in both feet.  IV: PIV TKO.    Resp/trach: Pt desats to 80s when turning, but once he is on his back again, his O2 sats are in the 90s. Expiratory wheezes present, albuterol neb given by RT x1. Intermittent productive cough.   Diet: Full liquid diet with honey-thickened liquids. Fair intake. TF at goal rate of 50 mL/hr.  Bowel status: BS+. BM in bedpan this shift. C/o nausea, PRN Zofran given x1, effective.  : Chong in place for wound healing. Cares completed.  Skin: L hip incision with mild erythema. Wound on urethral meatus of penis, WOC consulted. Slough present with some discharge.  Pain: Pt reported pain in ribs, legs, and hips. Lidocaine patch on ribs, PRN oxy given x1.  Activity: Assist of two and lift. Not OOB this shift. Turn/repo q2h.   Plan: Continue to monitor and follow plan of care.

## 2020-06-22 NOTE — PLAN OF CARE
"Status: Pt on 6A s/p L hip replacement ~2 weeks prior to admission. Admitted on 6/12/2020 after multiple falls, sustaining 3-8 R rib fractures with concern for alcohol withdrawal and seizures.  Vitals: VSS on RA.  Neuros: AxOx4. RUE & LUE 5/5, RLE & LLE 4/5. Neuropathy to feet at baseline.   IV: PIV TKO between antibiotics.   Resp/trach: LS diminished at bases.   Diet: Full liquid diet. NG w/ TF at goal rate of 50 mL/hr. Pt complaining some soreness to throat and also states \"the thickener makes everything warm\". Drinks chilled in bucket of ice chips and pt was okay w/ this.    Bowel status: BS+. One medium BM this shift.   : Chong in place for wound healing.   Skin: L hip incision DAPHNE w/ erythema. Wound on medial penis near . WOC consult in place. Slough present w/ do drainage noted.   Pain: Pt denied pain.   Activity: Up A2 and lift. Turns side to side well. Occasionally desats when turning.    Plan: MRCP this evening @ 1600. MRI checklist sent down. Continue to monitor and follow POC.     "

## 2020-06-22 NOTE — PROGRESS NOTES
"   06/22/20 1503   Quick Adds   Type of Visit Initial Occupational Therapy Evaluation   Living Environment   Lives With other relative(s)   Living Arrangements house   Home Accessibility stairs to enter home   Number of Stairs, Main Entrance 2   Stair Railings, Main Entrance railings safe and in good condition   Transportation Anticipated family or friend will provide   Living Environment Comment Patient reported he has lived with his sister [place] for the past 7-8 years. She works and does not provide much assistance to him per his report. He receives meals every 2 weeks through a meal service and a cleaning company comes 2x/week.    Self-Care   Usual Activity Tolerance good   Current Activity Tolerance fair   Regular Exercise No   Equipment Currently Used at Home shower chair;walker, rolling;walker, standard  (4WW)   Functional Level   Ambulation 1-->assistive equipment   Transferring 1-->assistive equipment   Toileting 0-->independent   Bathing 1-->assistive equipment   Dressing 0-->independent   Eating 0-->independent   Communication 0-->understands/communicates without difficulty   Swallowing 0-->swallows foods/liquids without difficulty   Cognition 1 - attention or memory deficits   Fall history within last six months yes   Number of times patient has fallen within last six months 5   Which of the above functional risks had a recent onset or change? ambulation;transferring;fall history   Prior Functional Level Comment Activity orders: up ad lobito   General Information   Onset of Illness/Injury or Date of Surgery - Date 06/22/20   Referring Physician Carina Puga MD    Patient/Family Goals Statement To go to a rehab   Additional Occupational Profile Info/Pertinent History of Current Problem \"Abhijeet Mccauley is a 59 year old male with a history of alcohol use disorder, alcohol withdrawal seizures, EtOH cirrhosis, COPD, ulcerative colitis, CKD III, AML (diagnosed 2008, s/p chemotherapy), " "and s/p left hip replacement ~2 weeks prior to admission admitted on 6/12/2020 after multiple falls c/b right 3-8 rib fractures with concern for alcohol withdrawal and seizures.\"   Precautions/Limitations left hip precautions   Cognitive Status Examination   Orientation orientation to person, place and time   Cognitive Comment Details of MoCA scores in flowsheet   Visual Perception   Visual Perception Wears glasses  (For reading purposes)   Sensory Examination   Sensory Quick Adds No deficits were identified   Pain Assessment   Patient Currently in Pain No   Range of Motion (ROM)   ROM Quick Adds No deficits were identified   Strength   Manual Muscle Testing Quick Adds No deficits were identified   Hand Strength   Hand Strength Comments WFL   Coordination   Coordination Comments WFL   Lower Body Dressing   Level of [place]: Dress Lower Body dependent (less than 25% patients effort)   Physical Assist/Nonphysical Assist: Dress Lower Body 1 person assist   Instrumental Activities of Daily Living (IADL)   Previous Responsibilities medication management   Activities of Daily Living Analysis   Impairments Contributing to Impaired Activities of Daily Living cognition impaired;post surgical precautions;strength decreased   General Therapy Interventions   Planned Therapy Interventions ADL retraining;cognition;strengthening;transfer training;progressive activity/exercise   Clinical Impression   Criteria for Skilled Therapeutic Interventions Met yes, treatment indicated   OT Diagnosis Patient presented to OT with decreased independence in activities of daily living.   Influenced by the following impairments Cognition, strength, endurance.   Assessment of Occupational Performance 5 or more Performance Deficits   Identified Performance Deficits Dressing, bathing, toileting, ambulating, transferring   Clinical Decision Making (Complexity) Low complexity   Therapy Frequency 5x/week   Predicted Duration of Therapy Intervention " (days/wks) 1 week   Anticipated Discharge Disposition Transitional Care Facility   Risks and Benefits of Treatment have been explained. Yes   Patient, Family & other staff in agreement with plan of care Yes   Total Evaluation Time   Total Evaluation Time (Minutes) 5

## 2020-06-22 NOTE — PLAN OF CARE
Discharge Planner PT   Patient plan for discharge: TCU  Current status: Patient very tired today, declined OOB activity, but agreeable to in bed activity. Patient instructed on supine exercises to help maintain strength and activity tolerance. Patient tolerated session well, some rest breaks required throughout 2/2 fatigue.   Barriers to return to prior living situation: Medical, safety with mobility, increased level of assist needed, decreased strength and activity tolerance.  Recommendations for discharge: TCU  Rationale for recommendations: Patient would greatly benefit from continued skilled PT/ OT in TCU setting to progress strength, activity tolerance and to maximize functional independence.       Entered by: Miranda Muhammad 06/22/2020 4:00 PM

## 2020-06-22 NOTE — PROGRESS NOTES
Social Work Services Progress Note    Hospital Day: 11  Date of Initial Social Work Evaluation:  6/18/2020  Collaborated with:  Juliana 1    Data:  SW is following for discharge planning.  OT and Physical Therapy are recommending TCU placement.      Intervention:  SW attended am Medicine rounds and per Juliana 1, discharge is anticipated tomorrow. SW met with pt, provided a list of SNF's near to pt's home, with Medicare star ratings and discussed facility preferences.  Pt states that [facility] is first choice.  Pt states that after that, he does not have a specific preference, he just wants to be placed as close to home as possible.  SW faxed referrals via [employer] to [facility], [facility], [facility], [facility], [facility] and to [facility].    Assessment:  Pt is agreeable to rehab placement    Plan:    Anticipated Disposition: Rehab placement in a community SNF    Barriers to d/c plan:  None at this time.    Follow Up:  SW will continue to follow.    RUDY Elias  Social Work, 6A  Phone:  [phone]  Pager:  [phone]  6/22/2020

## 2020-06-22 NOTE — PROCEDURES
[facility] [place]    Procedure: IR Procedure Note    Date/Time: 6/22/2020 10:30 AM  Performed by: Fabrice Reaves MD  Authorized by: Fabrice Reaves MD   IR Fellow Physician:  Other(s) attending procedure: staff Danny    UNIVERSAL PROTOCOL   Site Marked: NA  Prior Images Obtained and Reviewed:  Yes  Required items: Required blood products, implants, devices and special equipment available    Patient identity confirmed:  Verbally with patient, arm band, provided demographic data and hospital-assigned identification number  Patient was reevaluated immediately before administering moderate or deep sedation or anesthesia  Confirmation Checklist:  Patient's identity using two indicators, relevant allergies, procedure was appropriate and matched the consent or emergent situation and correct equipment/implants were available  Time out: Immediately prior to the procedure a time out was called    Universal Protocol: the Joint Commission Universal Protocol was followed    Preparation: Patient was prepped and draped in usual sterile fashion           ANESTHESIA    Anesthesia: Local infiltration  Local Anesthetic:  Lidocaine 1% without epinephrine      SEDATION    Patient Sedated: No    See dictated procedure note for full details.  Findings: Moderate ascites    Specimens: fluid and/or tissue for laboratory analysis and culture    Complications: None    Condition: Stable    Plan: - fluid sent for requested labs    PROCEDURE   Patient Tolerance:  Patient tolerated the procedure well with no immediate complications  Describe Procedure: - US guided paracentesis  Length of time physician/provider present for 1:1 monitoring during sedation: 0

## 2020-06-23 ENCOUNTER — APPOINTMENT (OUTPATIENT)
Dept: SPEECH THERAPY | Facility: CLINIC | Age: 60
End: 2020-06-23
Payer: COMMERCIAL

## 2020-06-23 ENCOUNTER — APPOINTMENT (OUTPATIENT)
Dept: OCCUPATIONAL THERAPY | Facility: CLINIC | Age: 60
End: 2020-06-23
Payer: COMMERCIAL

## 2020-06-23 ENCOUNTER — APPOINTMENT (OUTPATIENT)
Dept: PHYSICAL THERAPY | Facility: CLINIC | Age: 60
End: 2020-06-23
Payer: COMMERCIAL

## 2020-06-23 LAB
ALBUMIN SERPL-MCNC: 2 G/DL (ref 3.4–5)
ALP SERPL-CCNC: 738 U/L (ref 40–150)
ALT SERPL W P-5'-P-CCNC: 57 U/L (ref 0–70)
ANION GAP SERPL CALCULATED.3IONS-SCNC: 6 MMOL/L (ref 3–14)
AST SERPL W P-5'-P-CCNC: 78 U/L (ref 0–45)
BASOPHILS # BLD AUTO: 0.1 10E9/L (ref 0–0.2)
BASOPHILS NFR BLD AUTO: 0.6 %
BILIRUB SERPL-MCNC: 0.9 MG/DL (ref 0.2–1.3)
BUN SERPL-MCNC: 57 MG/DL (ref 7–30)
CALCIUM SERPL-MCNC: 8.9 MG/DL (ref 8.5–10.1)
CHLORIDE SERPL-SCNC: 110 MMOL/L (ref 94–109)
CO2 SERPL-SCNC: 25 MMOL/L (ref 20–32)
COPATH REPORT: NORMAL
CREAT SERPL-MCNC: 1.46 MG/DL (ref 0.66–1.25)
DIFFERENTIAL METHOD BLD: ABNORMAL
EOSINOPHIL # BLD AUTO: 0.5 10E9/L (ref 0–0.7)
EOSINOPHIL NFR BLD AUTO: 3 %
ERYTHROCYTE [DISTWIDTH] IN BLOOD BY AUTOMATED COUNT: ABNORMAL % (ref 10–15)
GFR SERPL CREATININE-BSD FRML MDRD: 51 ML/MIN/{1.73_M2}
GLUCOSE SERPL-MCNC: 128 MG/DL (ref 70–99)
HCT VFR BLD AUTO: 26 % (ref 40–53)
HGB BLD-MCNC: 7.8 G/DL (ref 13.3–17.7)
IMM GRANULOCYTES # BLD: 0.4 10E9/L (ref 0–0.4)
IMM GRANULOCYTES NFR BLD: 2.5 %
LYMPHOCYTES # BLD AUTO: 1.6 10E9/L (ref 0.8–5.3)
LYMPHOCYTES NFR BLD AUTO: 9.5 %
MCH RBC QN AUTO: 31.8 PG (ref 26.5–33)
MCHC RBC AUTO-ENTMCNC: 30 G/DL (ref 31.5–36.5)
MCV RBC AUTO: 106 FL (ref 78–100)
MONOCYTES # BLD AUTO: 2.3 10E9/L (ref 0–1.3)
MONOCYTES NFR BLD AUTO: 13.6 %
NEUTROPHILS # BLD AUTO: 11.9 10E9/L (ref 1.6–8.3)
NEUTROPHILS NFR BLD AUTO: 70.8 %
NRBC # BLD AUTO: 0 10*3/UL
NRBC BLD AUTO-RTO: 0 /100
PLATELET # BLD AUTO: 183 10E9/L (ref 150–450)
POTASSIUM SERPL-SCNC: 4.5 MMOL/L (ref 3.4–5.3)
PROT SERPL-MCNC: 6.1 G/DL (ref 6.8–8.8)
RBC # BLD AUTO: 2.45 10E12/L (ref 4.4–5.9)
RETICS # AUTO: 125.9 10E9/L (ref 25–95)
RETICS/RBC NFR AUTO: 5.1 % (ref 0.5–2)
SODIUM SERPL-SCNC: 141 MMOL/L (ref 133–144)
WBC # BLD AUTO: 16.8 10E9/L (ref 4–11)

## 2020-06-23 PROCEDURE — 25000132 ZZH RX MED GY IP 250 OP 250 PS 637: Performed by: NURSE PRACTITIONER

## 2020-06-23 PROCEDURE — 40000611 ZZHCL STATISTIC MORPHOLOGY W/INTERP HEMEPATH TC 85060: Performed by: STUDENT IN AN ORGANIZED HEALTH CARE EDUCATION/TRAINING PROGRAM

## 2020-06-23 PROCEDURE — 27210437 ZZH NUTRITION PRODUCT SEMIELEM INTERMED LITER

## 2020-06-23 PROCEDURE — 25000128 H RX IP 250 OP 636: Performed by: NURSE PRACTITIONER

## 2020-06-23 PROCEDURE — 97110 THERAPEUTIC EXERCISES: CPT | Mod: GP | Performed by: PHYSICAL THERAPIST

## 2020-06-23 PROCEDURE — 97530 THERAPEUTIC ACTIVITIES: CPT | Mod: GP | Performed by: PHYSICAL THERAPIST

## 2020-06-23 PROCEDURE — 25000132 ZZH RX MED GY IP 250 OP 250 PS 637: Performed by: INTERNAL MEDICINE

## 2020-06-23 PROCEDURE — 85025 COMPLETE CBC W/AUTO DIFF WBC: CPT | Performed by: STUDENT IN AN ORGANIZED HEALTH CARE EDUCATION/TRAINING PROGRAM

## 2020-06-23 PROCEDURE — 97116 GAIT TRAINING THERAPY: CPT | Mod: GP | Performed by: PHYSICAL THERAPIST

## 2020-06-23 PROCEDURE — 80053 COMPREHEN METABOLIC PANEL: CPT | Performed by: STUDENT IN AN ORGANIZED HEALTH CARE EDUCATION/TRAINING PROGRAM

## 2020-06-23 PROCEDURE — 36415 COLL VENOUS BLD VENIPUNCTURE: CPT | Performed by: STUDENT IN AN ORGANIZED HEALTH CARE EDUCATION/TRAINING PROGRAM

## 2020-06-23 PROCEDURE — 25000132 ZZH RX MED GY IP 250 OP 250 PS 637: Performed by: STUDENT IN AN ORGANIZED HEALTH CARE EDUCATION/TRAINING PROGRAM

## 2020-06-23 PROCEDURE — 97535 SELF CARE MNGMENT TRAINING: CPT | Mod: GO

## 2020-06-23 PROCEDURE — 12000001 ZZH R&B MED SURG/OB UMMC

## 2020-06-23 PROCEDURE — 92526 ORAL FUNCTION THERAPY: CPT | Mod: GN | Performed by: SPEECH-LANGUAGE PATHOLOGIST

## 2020-06-23 PROCEDURE — 25000128 H RX IP 250 OP 636: Performed by: STUDENT IN AN ORGANIZED HEALTH CARE EDUCATION/TRAINING PROGRAM

## 2020-06-23 PROCEDURE — 99233 SBSQ HOSP IP/OBS HIGH 50: CPT | Mod: GC | Performed by: INTERNAL MEDICINE

## 2020-06-23 PROCEDURE — 85045 AUTOMATED RETICULOCYTE COUNT: CPT | Performed by: STUDENT IN AN ORGANIZED HEALTH CARE EDUCATION/TRAINING PROGRAM

## 2020-06-23 RX ADMIN — MELATONIN TAB 3 MG 3 MG: 3 TAB at 19:32

## 2020-06-23 RX ADMIN — FOLIC ACID 1 MG: 1 TABLET ORAL at 09:50

## 2020-06-23 RX ADMIN — MULTIVITAMIN 15 ML: LIQUID ORAL at 11:23

## 2020-06-23 RX ADMIN — Medication 5 MG: at 19:32

## 2020-06-23 RX ADMIN — DIBASIC SODIUM PHOSPHATE, MONOBASIC POTASSIUM PHOSPHATE AND MONOBASIC SODIUM PHOSPHATE 500 MG: 852; 155; 130 TABLET ORAL at 09:49

## 2020-06-23 RX ADMIN — Medication 40 MG: at 07:54

## 2020-06-23 RX ADMIN — BUPROPION HYDROCHLORIDE 100 MG: 100 TABLET, FILM COATED ORAL at 21:17

## 2020-06-23 RX ADMIN — PROPRANOLOL HYDROCHLORIDE 20 MG: 10 TABLET ORAL at 19:32

## 2020-06-23 RX ADMIN — BUPROPION HYDROCHLORIDE 100 MG: 100 TABLET, FILM COATED ORAL at 09:48

## 2020-06-23 RX ADMIN — FUROSEMIDE 20 MG: 20 TABLET ORAL at 09:48

## 2020-06-23 RX ADMIN — ACETAMINOPHEN 650 MG: 325 TABLET, FILM COATED ORAL at 17:00

## 2020-06-23 RX ADMIN — THIAMINE HCL TAB 100 MG 100 MG: 100 TAB at 09:50

## 2020-06-23 RX ADMIN — PROPRANOLOL HYDROCHLORIDE 20 MG: 10 TABLET ORAL at 09:46

## 2020-06-23 RX ADMIN — NICOTINE 1 PATCH: 14 PATCH, EXTENDED RELEASE TRANSDERMAL at 08:00

## 2020-06-23 RX ADMIN — BUPROPION HYDROCHLORIDE 100 MG: 100 TABLET, FILM COATED ORAL at 14:48

## 2020-06-23 RX ADMIN — Medication 1 PACKET: at 19:33

## 2020-06-23 RX ADMIN — CEFEPIME 2 G: 2 INJECTION, POWDER, FOR SOLUTION INTRAVENOUS at 11:22

## 2020-06-23 RX ADMIN — Medication 40 MG: at 17:00

## 2020-06-23 RX ADMIN — RIFAXIMIN 550 MG: 550 TABLET ORAL at 09:47

## 2020-06-23 RX ADMIN — FLUTICASONE FUROATE AND VILANTEROL TRIFENATATE 1 PUFF: 200; 25 POWDER RESPIRATORY (INHALATION) at 09:49

## 2020-06-23 RX ADMIN — Medication 5000 UNITS: at 11:22

## 2020-06-23 RX ADMIN — ASPIRIN 81 MG CHEWABLE TABLET 81 MG: 81 TABLET CHEWABLE at 09:47

## 2020-06-23 RX ADMIN — Medication 1 PACKET: at 14:47

## 2020-06-23 RX ADMIN — ACETAMINOPHEN 650 MG: 325 TABLET, FILM COATED ORAL at 09:48

## 2020-06-23 RX ADMIN — Medication 5 MG: at 14:50

## 2020-06-23 RX ADMIN — SPIRONOLACTONE 50 MG: 25 TABLET ORAL at 09:48

## 2020-06-23 RX ADMIN — Medication 5000 UNITS: at 03:46

## 2020-06-23 RX ADMIN — ONDANSETRON 4 MG: 2 INJECTION INTRAMUSCULAR; INTRAVENOUS at 07:54

## 2020-06-23 RX ADMIN — OXYCODONE HYDROCHLORIDE 5 MG: 5 TABLET ORAL at 17:00

## 2020-06-23 RX ADMIN — GABAPENTIN 600 MG: 300 CAPSULE ORAL at 14:48

## 2020-06-23 RX ADMIN — ATORVASTATIN CALCIUM 20 MG: 20 TABLET, FILM COATED ORAL at 09:47

## 2020-06-23 RX ADMIN — RIFAXIMIN 550 MG: 550 TABLET ORAL at 19:32

## 2020-06-23 RX ADMIN — ACETAMINOPHEN 650 MG: 325 TABLET, FILM COATED ORAL at 00:16

## 2020-06-23 RX ADMIN — Medication 1 PACKET: at 11:23

## 2020-06-23 RX ADMIN — CEFEPIME 2 G: 2 INJECTION, POWDER, FOR SOLUTION INTRAVENOUS at 21:17

## 2020-06-23 RX ADMIN — MONTELUKAST 10 MG: 10 TABLET, FILM COATED ORAL at 21:17

## 2020-06-23 RX ADMIN — LIDOCAINE 1 PATCH: 560 PATCH PERCUTANEOUS; TOPICAL; TRANSDERMAL at 14:47

## 2020-06-23 RX ADMIN — Medication 5000 UNITS: at 18:36

## 2020-06-23 RX ADMIN — DIBASIC SODIUM PHOSPHATE, MONOBASIC POTASSIUM PHOSPHATE AND MONOBASIC SODIUM PHOSPHATE 500 MG: 852; 155; 130 TABLET ORAL at 19:32

## 2020-06-23 RX ADMIN — MELATONIN TAB 3 MG 3 MG: 3 TAB at 00:16

## 2020-06-23 RX ADMIN — GABAPENTIN 600 MG: 300 CAPSULE ORAL at 09:47

## 2020-06-23 RX ADMIN — GABAPENTIN 600 MG: 300 CAPSULE ORAL at 19:32

## 2020-06-23 RX ADMIN — Medication 5 MG: at 09:46

## 2020-06-23 ASSESSMENT — ACTIVITIES OF DAILY LIVING (ADL)
ADLS_ACUITY_SCORE: 23
ADLS_ACUITY_SCORE: 23
ADLS_ACUITY_SCORE: 24
ADLS_ACUITY_SCORE: 23
ADLS_ACUITY_SCORE: 24
ADLS_ACUITY_SCORE: 23

## 2020-06-23 NOTE — PROVIDER NOTIFICATION
"\"JORGE rm 203 6A Juliana 1. Pt reporting new/different abdominal pain/fullness. also reporting tenderness in all 4 abd quadrants. Thanks Adolfo [id number]\"        Paged juliana crosscover at [phone]    "

## 2020-06-23 NOTE — PLAN OF CARE
Status: Pt on 6A s/p L hip replacement ~2 weeks prior to admission. Admitted on 6/12/2020 after multiple falls, sustaining 3-8 R rib fractures with concern for alcohol withdrawal and seizures.  Vitals: VSS ex intermittent O2 drop (see respiratory).   Neuros: Alert and oriented x4 this shift. Intermittently delirious. Restless at times. BUE 5/5. BLE 4/5. N/T in both feet. Tearful this shift, worried about leaving hospital.  IV: PIV TKO.    Resp/trach: Pt desats to high 80s when turning, but once he is on his back again, his O2 sats are in the high 90s. Expiratory wheezes present. Intermittent productive cough.   Diet: Full liquid diet with honey-thickened liquids. Fair intake. TF at goal rate of 50 mL/hr.  Bowel status: BS+. Last BM today.  : Chong in place for wound healing. Cares completed.  Skin: L hip incision with mild erythema. Wound on urethral meatus of penis, WOC consulted. Microklenz, Triad paste, and Xeroform gauze used per WOC note. Slough present with some discharge.  Pain: Pt reported pain in ribs, legs, and hips. Scheduled Tylenol and repositioning effective.  Activity: Assist of two and lift. Not OOB this shift. Turn/repo q2h.   Plan: MRCP completed this shift. Continue to monitor and follow plan of care.

## 2020-06-23 NOTE — PROVIDER NOTIFICATION
"Provider notified about TF stopped. Pt stated \"feels dripping in back of throat.\" Paged provider x 2, no interventions ordered.    "

## 2020-06-23 NOTE — PLAN OF CARE
Discharge Planner SLP   Patient plan for discharge: not discussed  Current status: Patient seen for dysphagia treatment while seated up in chair (required assist for positioning upright). Note intermittent cough or throat clear after sips of water by cup. Note one instance of throat clear after sip of nectar thick liquid but no other overt aspiration signs occurred with additional sips of nectar thick liquid, puree or solid texture. Note slow incoordinated mastication of cracker further impacted by multiple missing teeth. Also note occasional soft burping after oral intake and patient reported feeling full after limited intake.    Recommend advance diet to dysphagia diet level 1 with nectar thick liquid given set up and check in supervision and reflux precautions and swallow strategies (up in chair, small bites/sips, no straws, slow rate). Monitor for increased signs of aspiration and thicken liquids to honey consistency. Will continue to follow.    Barriers to return to prior living situation: medical status, weakness, dysphagia, TF, modified diet  Recommendations for discharge: TCU  Rationale for recommendations: currently benefits from SLP intervention for increased aspiration risk and below baseline swallowing skills       Entered by: Renita Zhang 06/23/2020 12:48 PM

## 2020-06-23 NOTE — PLAN OF CARE
03  Status: Pt on 6A s/p L hip replacement ~2 weeks prior to admission. Admitted on 6/12/2020 after multiple falls, sustaining 3-8 R rib fractures with concern for alcohol withdrawal and seizures.  Vitals: VSS on RA, dyspnea on exertion, douglas w/ turns.   Neuros: AO x 4. BUE 5/5. BLE 4/5. N/T in both feet, baseline.   IV: PIV TKO.    Resp/trach: Expiratory wheezes, continuous pulse ox on d/t desats upon exertion.   Diet: DD1 w/ nectar thick liquids, per speech. Tray set-up and check-in on meals. TF stopped at 0700 d/t abd pain, attempted to restart at 1200, but pt stated that he could feel drainage in back of throat. TF stopped, MD paged, no interventions ordered.   Bowel status: BM this shift, incontinent.   : Ga in place for wound healing, ga cares done.   Skin: Wound on uretheal meatus of penis, WOC following. L hip incision, erythema.   Pain: No statements of pain this shift.   Activity: A2 w/ lift. Pt up to chair. Worked w/ PT today.   Plan: Continue to monitor and follow plan of care.

## 2020-06-23 NOTE — PLAN OF CARE
Shift: [id number]    Status: s/p multiple falls at home resulting in R rib fractures. S/p L hip surgery on 5/29/2020  Neuro: Alert and oriented x4, able to make needs known, calls appropriately. Intermittently forgetful and restless  Cardiac/VS: VSS  Respiratory: RA  GI: Denies N/V, reporting abdominal pain/distension  Diet/Appetite: NPO, TF at 50 mL/hr  : Chong in place for penile meatus wound healing   Activity: Ax2 w/ lift, refusing turns at times  Pain: New abdominal pain/tenderness/fullness this AM, R rib pain with turns.   Skin: No new deficits   LDA(s): PIV TKO, NG tube      Plan: Plan for TCU placement, continue plan of care

## 2020-06-23 NOTE — PROGRESS NOTES
Care Coordinator Progress Note    Admission Date/Time:  6/12/2020  Attending MD:  Dr Sydnie Unger    Coordination of Care and Referrals  Received a call from Miriam Vickers RN HC Liaison. She reported [facility] was providing skilled home care services, RN,PT & OT, prior to admission. Informed her TCU is recommended at this time.       Allison Vora RN Care Coordinator  [address], [facility]

## 2020-06-23 NOTE — PLAN OF CARE
Discharge Planner OT   Patient plan for discharge: TCU.  Current status: Patient with good progress today in OT. With minimal assistance, he completed sit to stand transfers from recliner chair and in bathroom. Patient was able to alternate between sitting and standing positions at sink to complete grooming/hygiene tasks with CGA.  Barriers to return to prior living situation: Medical status, strength, endurance, balance, cognition.  Recommendations for discharge: TCU.  Rationale for recommendations: OT will continue to follow patient while in hospital to progress functional independence.       Entered by: Kristal Rome 06/23/2020 4:22 PM

## 2020-06-23 NOTE — PROGRESS NOTES
[facility], [place]    Progress Note - Juliana 1 Service        Date of Admission:  6/12/2020    Assessment & Plan   Abhijeet Mccauley is a 59 year old male with a history of alcohol use disorder, alcohol withdrawal seizures, EtOH cirrhosis, COPD, ulcerative colitis, CKD III, AML (diagnosed 2008, s/p chemotherapy), and s/p left hip replacement ~2 weeks prior to admission admitted on 6/12/2020 after multiple falls c/b right 3-8 rib fractures with concern for alcohol withdrawal and seizures. His course has been complicated by enterobacter urinary tract infection with an obstructing renal stone now s/p stent placement. He is now out of the SICU and on the floor, and has had persistently elevated alkaline phosphatase as well as persistent leukocytosis.      CHANGES TODAY  - MRCP without obvious cause of elevated alkaline phosphatase, will obtain GI consult  - Heme/onc consult for consideration of whether a bone marrow biopsy would helpful for workup of persistent leukocytosis in the setting of prior AML  - Discussed recommendation of sobriety today, as well as strategies for that. Will look into options for cravings reduction in the setting of cirrhosis.     Elevated alkaline phosphatase  Moderate to large ascites   Cirrhosis  Persistently elevated alkaline phosphatase to the 700s, likely biliary source given elevated GGT as well. The cause of his elevated alkaline phosphatase is unclear. Differential diagnosis includes infiltration (such as with AML), PBC, and drug induced liver injury. Ultrasound demonstrated cirrhosis changes, no suspicious hepatic lesions, gallbladder without evidence of cholecystitis, but the common bile duct was not visualized. MRCP without evidence of biliary pathology to account for the elevated alkaline phosphatase. In terms of cirrhosis and ascites, paracentesis on 6/22 consistent with ascites from portal hypertension without evidence of SBP. Reported history of  esophageal varices, but none documented on last EGD in February.   - GI consult for assistance with further workup of elevated alkaline phosphatase  - If concern for worsening encephalopathy, would consider adding empiric lactulose  - Continue rifaximin BID  - Continue spironolactone 50 mg daily  - Continue propranalol    Acute hypoxic respiratory failure, improving   Pulmonary edema  Now extubated and weaned to room air. Echo 5/21 with EF 55-60%, trace to mild tricuspid insufficiency, normal IVC with preserved respiratory variability. He does have known ascites and had been on furosemide 20 mg oral daily prior to admission, which was held during his ICU admission in the setting of sepsis.   - 20 mg oral lasix daily  - Daily BMP, Mg  - Therapeutic paracentesis on 6/22 was helpful with respiratory status  - Incentive spirometry  - Daily weights    Urinary tract infection and bacteremia (enterobacter cloacae)  Left upper pole obstructing stone  S/p left ureteral stent placement on 06/13 by Dr. Lino. Definitive stone treatment per urology once acute illness resolves. Being treated with cefepime. Briefly transitioned to ceftriaxone, but now back to cefepime. Unfortunately no options for oral de-escalation of antibiotic therapy given concern with inducible amp C resistance with 3rd generation cephalosporins and the bacteria's resistance to fluoroquinolones and bactrim.   - Continue cefepime for enterobacter and concern for inducible amp C resistance. Will need 14 day course of antibiotics, through 6/27.  - Will need PICC prior to discharge if course has not been completed  - Per urology, no role for continued antibiotics through intervention   - Patient has been scheduled for office visit with Dr. Hale in ~1 month, sister aware  - Please page urology prior to discharge to discuss plan with patient in person    Abx:   -- Cefepime (6/14-6/20, again 6/22-*)  -- Ceftriaxone (6/20-6/21)    Alcohol withdrawal  Alcohol  use disorder  - Folic acid and thiamine supplementation  - S/p ativan protocol which was discontinued on 6/18  - Will discuss naltrexone with pharmacy. Per guidelines, shouldn't be used in acute hepatic failure, but may be safe in decompensated cirrhosis.     Metabolic encephalopathy  Concern for seizures  Awake and alert, but still intermittently confused. Neurology consulted previously for seizures and altered mental status--they felt seizures were provoked by a combination of alcohol withdrawal, sepsis, and metabolic derangements. Thus, no anti-seizure medication was initiated. vEEG completed 6/14 without subclinical seizures. Encephalopathy most likely multifactorial in the setting of infection, withdrawal, and ICU delirium. There could also be an element of hepatic encephalopathy, particularly given asterixis on exam. Ammonia was within normal limits. OT MoCA score 11/22 (18 and above is normal).     Rib fractures  - Tylenol 650 mg Q8H (2g max in the setting of cirrhosis)  - Lidocaine patches  - Oxycodone 5 mg Q4H PRN  - Continue home baclofen 5 mg TID (reduced from home dose of 10 mg TID)  - Continue home gabapentin 600 mg TID  - PT and OT evaluations    Genital wound  See images under misc tab. Patient denies significant pain. Suspect pressure wound related to prior edema and possible component of candida/intertrigo. Syphilis testing negative. Gonorrhea and chlamydia negative. HSV 1/2 negative.   - WOC nurse consult  - Continue urinary catheter, will need this changed monthly, until these wounds heal. Last changed 6/20, will need to be changed next 7/20/2020    RESOLVED  Hypernatremia- Continue free water flushes to 120 mL Q4H  GENEVIEVE on CKD- Likely secondary to septic shock. Unclear baseline creatinine in times of wellness, but appears to be ~1.7  Bright red blood per rectum- Possibly from rectal tube. No further episodes.    CHRONIC  Acute on chronic macrocytic anemia- Combination of critical illness, liver  disease, alcohol use, and recent blood loss.  S/p knee replacement- Per note from 5/30, recommended 81 mg daily for 42 days (through 7/11)   COPD- continue breo ellipta, singulair, PRN albuterol  Hyperlipidemia- Continue home atorvastatin  Generalized anxiety disorder- Continue home bupropion; holding trazadone and hydroxyzine  Tobacco use- Continue nicotine patch  Gastritis- continue protonix BID     Diet: Adult Formula Drip Feeding: Continuous Impact Peptide 1.5; Nasoduodenal tube; Goal Rate: 50; mL/hr; Medication - Feeding Tube Flush Frequency: At least 15-30 mL water before and after medication administration and with tube clogging; Amount to Sen...  Dysphagia Diet Level 1 Pureed Nectar Thickened Liquids (pre-thickened or use instant food thickener)    Fluids: None  Lines: PIV, NG, urinary catheter for wound healing  DVT Prophylaxis: Heparin SQ  Chong Catheter: in place, indication: Strict 1-2 Hour I&O, Strict 1-2 Hour I&O, Wound Healing  Code Status: Full Code           Disposition Plan   Expected discharge: 2 - 3 days, recommended to transitional care unit once safe disposition plan/ TCU bed available and workup complete.  Entered: Flora Alejo MD 06/23/2020, 12:59 PM       This patient was staffed with Dr. Jama.     Flora Alejo MD  [place] [id number] Service  [facility], [place]  Pager: [phone]  Please see sticky note for cross cover information  ______________________________________________________________________    Interval History   No acute events overnight. Vital signs stable. Having some nausea this morning, resolved with zofran and holding his tube feeds. Feels the paracentesis helped with his breathing and distension. Discussed sobriety, and says that he'd like to be sober, but it's difficult when everyone in his family drinks heavily. AA hasn't been helpful in the past, because he doesn't resonate with a higher power. He's never tried craving reducing  medications, but would be interested.     Data reviewed today: I reviewed all medications, new labs and imaging results over the last 24 hours.     Physical Exam   Vital Signs: Temp: 96.3  F (35.7  C) Temp src: Oral BP: 122/52 Pulse: 68 Heart Rate: 74 Resp: 16 SpO2: 96 % O2 Device: None (Room air)    Weight: 192 lbs 14.44 oz  Constitutional: Awake and alert. Comfortable-appearing. Sitting in a chair.   Eyes: No conjunctival injection or scleral icterus.   ENT: Scattered punctate erythematous lesions on lips, improving.   Respiratory: Saturating well on room air. Clear to auscultation bilaterally.   Cardiovascular: RRR. Normal S1/S2. No murmurs appreciated.   GI: Mildly distended. Soft. Nontender.   Musculoskeletal: No significant lower extremity edema.   Neurologic: Alert and oriented. No facial asymmetry. Moves all extremities appropriately.     Data   Recent Labs   Lab 06/23/20  0630 06/22/20  0552 06/21/20  1843 06/21/20  0643   WBC 16.8* 17.2*  --  17.0*   HGB 7.8* 7.7* 8.0* 7.9*   * 105*  --  105*    164  --  150   INR  --   --   --  1.44*    141  --  145*   POTASSIUM 4.5 4.0  --  4.0   CHLORIDE 110* 109  --  112*   CO2 25 28  --  25   BUN 57* 57*  --  62*   CR 1.46* 1.49*  --  1.46*   ANIONGAP 6 5  --  8   BEE 8.9 8.7  --  9.0   * 104*  --  145*   ALBUMIN 2.0* 2.0*  --  2.0*   PROTTOTAL 6.1* 6.1*  --  6.4*   BILITOTAL 0.9 0.9  --  1.1   ALKPHOS 738* 753*  --  796*   ALT 57 51  --  37   AST 78* 80*  --  59*     Recent Results (from the past 24 hour(s))   MR Abdomen MRCP w/o & w Contrast    Narrative    EXAM: MR ABDOMEN MRCP W/O & W CONTRAST  6/22/2020 5:10 PM    HISTORY: Elevated alk phos, hx of alcoholic cirrhosis and AML.  Abdominal US unable to characterize CBD, r/o infiltrative liver  disease and low grade obstruction.    COMPARISON:  CT 6/14/2020 and MRI 1/7/2020.     TECHNIQUE: Coronal T2 HASTE, sagittal T2 HASTE, axial T2 STIR, axial  SHARDA T2, In-phase and Out-of-phase axial  breath-hold FLASH,  diffusion-weighted, and T1-weighted VIBE axial fat saturation images  were obtained. Thick and thin slab heavily T2-weighted MRCP images  were obtained. 3-D reformatted images were created by the  technologist. Following administration of secretin, T2-weighted HASTE  images were obtained at 1 minute intervals to 7 minutes, and an  additional 10 minutes image was also obtained.     Contrast and medications: 10 mls Gadavist    FINDINGS:   Liver: Cirrhotic morphology. No abnormal arterial enhancing lesions  allowing for some motion artifact on several sequences. Mild loss of  signal on out of phase imaging likely due to fat deposition.    Biliary: Multiple subcentimeter gallstones measuring up to 9 mm. No  intra or extrahepatic biliary dilation. The common bile duct measures  4 mm    Pancreas: Normal T1 signal. No focal ductal dilation. No side branches  are visualized.    Spleen: Within normal limits.    Kidneys: Atrophic left kidney with multiple fluid-filled regions. When  correlated to CT, cystic lesions in the upper pole appear to be  dilated collecting system/calyceal diverticula, filled with stones on  CT. Ureteral stent is present on recent CT, not well visualized on  MRI.    Adrenal glands: Mild thickening of the left. No discrete lesion or  abnormal enhancement.    Bowel: Feeding tube tip is within the distal duodenum. No abnormally  dilated or thickened loops of bowel. Mesenteric edema.    Lymph nodes: No abnormal enlarged lymph nodes.    Blood vessels: Numerous varices in the upper abdomen especially in the  left abdomen inferior to the spleen. Gastroesophageal and  paraesophageal varices. Visualized aorta is patent and normal caliber  with atherosclerotic calcifications throughout.    Lung bases: Partial consolidation of the right upper lobe. Small right  and trace left pleural effusions. Mild diffuse increase in T2 signal  in the lower lung fields, likely worsening pulmonary  edema.    Bones and soft tissues: On coronal imaging, there is some abnormal  enhancement numerous right anterior and posterior ribs for example on  series 21 image 57 which correlate with rib fractures on prior  radiograph 6/20/2020. Body wall edema.    Ascites: Small intra-abdominal ascites.      Impression    IMPRESSION:  1. Cholelithiasis without evidence of acute cholecystitis. Common bile  duct is normal caliber.  2. Cirrhotic liver with steatosis. Sequelae of portal venous  hypertension.  3. Small intra-abdominal ascites, small pleural effusions, and likely  pulmonary interstitial edema.  4. Right upper lobe subsegmental atelectasis. Abnormal enhancement  along the right chest wall correlates with rib fractures on recent  chest radiograph.    I have personally reviewed the examination and initial interpretation  and I agree with the findings.    LEXX DONIS MD

## 2020-06-23 NOTE — CONSULTS
GASTROENTEROLOGY CONSULTATION      Date of Admission: 6/12/2020       Date of Consult: 6/23/20          Chief Complaint:   We were asked by Melo Hines MD to evaluate this patient with elevated alkaline phosphatase         ASSESSMENT AND RECOMMENDATIONS:   Assessment:  Abhijeet Mccauley is a 59 year old male with a history of decompensated alcoholic cirrhosis c/b ascites and EV (banded in 2018), alcoholic use disorder, ?ulcerative colitis CKD III, AML (daignosed 2008) s/p chemotherapy now in remission, admitted on 6/12 following multiple falls in setting of recent L hip replacement. Hospital course complicated by withdrawal seizures and obstructive uropathy (s/p stent placement). Noted to have chronically elevated alkaline phosphatase, for which GI has been consulted.    # Chronically elevated alkaline phosphatase  Patient with longstanding history of alcohol use. Noted to have AST/ALT elevations on several occasions over last 5-6 years consistent with alcoholic hepatitis. ALP has also been elevated since 2014, however, noted to be in 700s range (together with mild AST elevation) during this admission. Of note patient has no RUQ pain, jaundice or fevers and other LFTs are unremarkable. RUQ US showed cirrhotic morphology of the liver without focal hepatic lesions or intrahepatic biliary dilation. He had an MRCP that showed cholelithiasis without evidence of acute cholecystitis, a normal CBD and no biliary dilation.   Suspect isolated ALP elevation is multifactorial; patient with pulmonary edema and elevated pro-BNP (significant elevation of ALP levels has been reported with congestive hepatopathy), bacteremia with likely intrahepatic cholestasis, drug-related (patient on cefepime which known to cause elevated transaminases), known history of decompensated cirrhosis, and recent falls and multiple fractures in setting of known AML, could all be contributing. Unclear if patient had confimed UC or not, this was  not documented on two recent colonoscopies (2018 and 2020) and while MRI/MRCP did not report stricturing disease, PSC could still be a consideration here. Pursuing further workup (such as EUS with liver biopsy) while inpatient would unlikely  given active drinking history and lack of hepatology follow up for future management.      # Decompensated alcoholic cirrhosis (MELD-Na 14)  Etiology: Alcohol  Ascites: Yes, on spironolactone 50 mg, lasix 20 mg daily at home, had diagnostic/therapeutic paracentesis yesterday with removal of 3.2 L, analysis consistent with portal hypertension and no evidence of SBP  HE: No, on home lactulose  EV: Last EGD 02/2020 w/o varices or PHG. Had banding done in 2018 with complete eradication. On propranolol 20 mg daily  HCC screening: No suspicious hepatic lesion identified on US 6/20/20  Transplant candidacy: No prior evaluation, unlikely a candidate given active alcohol use and current co morbidities, defer further care and recommendations to hepatology     Recommendations  - No plans for endoscopic intervention(s) at this point  - Recommend close outpatient follow up with hepatology service (per primary team plan to follow with [facility])   - Continue PTA lactulose and rifaximin  - Continue PTA diuretics and propranolol. Could consider up titration of diuretics based on volume status and renal function  - Counseled on complete alcohol abstinence and offered assistance to programs but patient declines   - GI service will sign off, please call for questions      Patient care plan discussed with Dr. Flores, GI staff physician. Thank you for involving us in this patient's care. Please do not hesitate to contact the GI service with any questions or concerns.     Leandro Reyes MD  GI Fellow   #[id number]   -------------------------------------------------------------------------------------------------------------------   History is obtained from the patient and the  medical record.          History of Present Illness:   Abhijeet Mccauley is a 59 year old male with a history of decompensated alcoholic cirrhosis c/b ascites and EV (banded in 2018), alcoholic use disorder, ?ulcerative colitis CKD III, AML (daignosed 2008) s/p chemotherapy now in remission, admitted on 6/12 following multiple falls in setting of recent L hip replacement. Hospital course complicated by withdrawal seizures and obstructive uropathy (s/p stent placement). Noted to have chronically elevated alkaline phosphatase, for which GI has been consulted.  Patient admitted two weeks ago following a fall, he was noted to also be intermittently confused, with multiple rib fractures. He was intubated in the [facility] for seizure activity and transferred to ICU.   He is now out of ICU on medical floor, noted to have elevated ALP worsened from prior.   Patient admits active alcohol use (4 Vodkas daily), denies RUQ pain, N/V, itching, jaundice, fevers or chills. Had a paracentesis done yesterday which helped relieving his abdominal distension and SOB.   Of note, he had no prior biliary diseases or surgeries, he does not know if he had UC before or not, not on regular follow up with hepatology and had several EGDs, colonoscopies done over last 1-2 years for GI bleeding.              Past Medical History:   Reviewed and edited as appropriate  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)     s/p chemo, no bone marrow transplant     Chronic obstructive pulmonary disease 5/17/2018     COPD (chronic obstructive pulmonary disease) (H)      History of pulmonary embolus (PE)      Hypertension      PUD (peptic ulcer disease)      Tobacco dependence      Ulcerative colitis (H)             Past Surgical History:   Reviewed and edited as appropriate   Past Surgical History:   Procedure  Laterality Date     ARTHROPLASTY HIP Left 5/29/2020    Procedure: ARTHROPLASTY, HIP, TOTAL;  Surgeon: Carlton Jones MD;  Location: [facility]     AS TOTAL KNEE ARTHROPLASTY Left      BONE MARROW BIOPSY, BONE SPECIMEN, NEEDLE/TROCAR N/A 6/12/2018    Procedure: BIOPSY BONE MARROW;  Bone Marrow Biopsy;  Surgeon: Demar Sapp MD;  Location: [facility]     CYSTOSCOPY, RETROGRADES, INSERT STENT URETER(S), COMBINED Left 6/13/2020    Procedure: CYSTOSCOPY, WITH RETROGRADE PYELOGRAM AND URETERAL STENT INSERTION;  Surgeon: Renita Lino MD;  [facility]     ESOPHAGOSCOPY, GASTROSCOPY, DUODENOSCOPY (EGD), COMBINED N/A 2/8/2018    Procedure: COMBINED ESOPHAGOSCOPY, GASTROSCOPY, DUODENOSCOPY (EGD), BIOPSY SINGLE OR MULTIPLE;  gastroscopy with biopsies;  Surgeon: Raz Cobb MD;  Location: [facility]     ESOPHAGOSCOPY, GASTROSCOPY, DUODENOSCOPY (EGD), COMBINED N/A 3/18/2018    Procedure: COMBINED ESOPHAGOSCOPY, GASTROSCOPY, DUODENOSCOPY (EGD);;  Surgeon: Raz Cobb MD;  [facility]: [facility]     ESOPHAGOSCOPY, GASTROSCOPY, DUODENOSCOPY (EGD), COMBINED N/A 2/28/2020    Procedure: ESOPHAGOGASTRODUODENOSCOPY, WITH BIOPSY;  Surgeon: Chente Mclaughlin DO;  Location: [facility]     IR PARACENTESIS  6/22/2020     LACERATION REPAIR Right     Right leg     PERCUTANEOUS NEPHROSTOMY Left 6/13/2020    Procedure: CREATION, NEPHROSTOMY, PERCUTANEOUS;  Surgeon: Iris Barkley MD;  [facility]     PHACOEMULSIFICATION WITH STANDARD INTRAOCULAR LENS IMPLANT Left 7/1/2019    Procedure: cataract removal with implant.;  Surgeon: Adrian Oliveira MD;  Location: [facility]     PHACOEMULSIFICATION WITH STANDARD INTRAOCULAR LENS IMPLANT Right 7/29/2019    Procedure: Cataract removal with implant.;  Surgeon: Adrian Oliveira MD;  Location: [facility]            Previous Endoscopy:     Results for orders placed or performed during the hospital encounter of 03/16/18   COLONOSCOPY   Result Value Ref Range     COLONOSCOPY       _______________________________________________________________________________  Patient Name: Abhijeet Mccauley         Procedure Date: 3/18/2018 6:55 AM  MRN: [id number]                       YOB: 1960  Admit Type: Inpatient                 Age: 57  Gender: Male                          Attending MD: Raz Cobb MD  Total Sedation Time:                    _______________________________________________________________________________     Procedure:           Colonoscopy  Indications:         Rectal bleeding  Providers:           Raz Cobb MD, Treasure De León RN  Referring MD:          Medicines:           Propofol per Anesthesia  Complications:       No immediate complications.  _______________________________________________________________________________  Procedure:           After obtaining informed consent, the colonoscope was                        passed under direct vision. Throughout the procedure,                        the patient's blood pressure, pulse , and oxygen                        saturations were monitored continuously. The Colonoscope                        was introduced through the anus and advanced to the                        cecum, identified by appendiceal orifice and ileocecal                        valve. The colonoscopy was performed without difficulty.                        The patient tolerated the procedure well. The quality of                        the bowel preparation was good.                                                                                   Findings:       The perianal and digital rectal examinations were normal.       A few small-mouthed diverticula were found in the sigmoid colon.       A 2 mm polyp was found in the transverse colon. The polyp was sessile.        Coagulation for tissue destruction using monopolar probe was successful.       A 3 mm polyp was found in the splenic flexure. The polyp was sessile.         This was biopsied with a hot forceps for histology.       A 5 mm polyp w as found in the splenic flexure. The polyp was        semi-sessile. The polyp was removed with a hot snare. Resection and        retrieval were complete.       A 5 mm polyp was found in the rectum. The polyp was sessile. The polyp        was removed with a hot snare. Resection and retrieval were complete.       The retroflexed view of the distal rectum and anal verge was normal and        showed no anal or rectal abnormalities.                                                                                   Impression:          - Diverticulosis in the sigmoid colon.                       - One 2 mm polyp in the transverse colon. Treated with a                        monopolar probe.                       - One 3 mm polyp at the splenic flexure. Biopsied.                       - One 5 mm polyp at the splenic flexure, removed with a                        hot snare. Resected and retrieved.                       - One 5 mm polyp in the rectum, removed with a hot                        sn are. Resected and retrieved.                       - The distal rectum and anal verge are normal on                        retroflexion view.  Recommendation:      - Return patient to hospital rouse for ongoing care.                                                                                     Signed electronically by Raz Cobb M.D.  _________________  Raz Cobb MD  3/18/2018 10:03:07 AM  I was physically present for the entire viewing portion of the exam.  [id number] Cobb MD  Number of Addenda: 0    Note Initiated On: 3/18/2018 6:55 AM  Scope In:  Scope Out:              Social History:   Reviewed and edited as appropriate  Social History     Socioeconomic History     Marital status: Single     Spouse name: Not on file     Number of children: Not on file     Years of education: Not on file     Highest education level: Not on file   Occupational  History     Not on file   Social Needs     Financial resource strain: Not on file     Food insecurity     Worry: Not on file     Inability: Not on file     Transportation needs     Medical: Not on file     Non-medical: Not on file   Tobacco Use     Smoking status: Current Every Day Smoker     Packs/day: 0.50     Years: 30.00     Pack years: 15.00     Types: Cigarettes     Smokeless tobacco: Never Used     Tobacco comment: 5 cigarettes a day    Substance and Sexual Activity     Alcohol use: Yes     Comment: Down to 3 beers per day.  Sometimes a cocktail also.     Drug use: Yes     Types: Marijuana     Sexual activity: Not on file   Lifestyle     Physical activity     Days per week: Not on file     Minutes per session: Not on file     Stress: Not on file   Relationships     Social connections     Talks on phone: Not on file     Gets together: Not on file     Attends [employer] service: Not on file     Active member of club or organization: Not on file     Attends meetings of clubs or organizations: Not on file     Relationship status: Not on file     Intimate partner violence     Fear of current or ex partner: Not on file     Emotionally abused: Not on file     Physically abused: Not on file     Forced sexual activity: Not on file   Other Topics Concern     Parent/sibling w/ CABG, MI or angioplasty before 65F 55M? Not Asked   Social History Narrative     Not on file            Family History:   Reviewed and edited as appropriate  Family History   Problem Relation Age of Onset     Hypertension Mother      Coronary Artery Disease Father         MI           Allergies:   Reviewed and edited as appropriate     Allergies   Allergen Reactions     Blood Transfusion Related (Informational Only)      Patient has a history of a clinically significant antibody against RBC antigens.  A delay in compatible RBCs may occur.     Famotidine Unknown and Other (See Comments)     Severe abdominal cramps     Cyclobenzaprine Hives      "Methocarbamol Other (See Comments)     Made pt's \"face break out\"     Pepcid Cramps     Severe abdominal cramps     Vancomycin Other (See Comments)     hallucinations     Vfend Other (See Comments)     IV - cold sweats, Iron tablet makes him nauseated and stomach ached     Hydrocodone-Acetaminophen Rash            Medications:     Current Facility-Administered Medications   Medication     acetaminophen (TYLENOL) tablet 650 mg     albuterol (PROVENTIL) neb solution 2.5 mg     aspirin (ASA) chewable tablet 81 mg     atorvastatin (LIPITOR) tablet 20 mg     baclofen (LIORESAL) half-tab 5 mg     bisacodyl (DULCOLAX) Suppository 10 mg     buPROPion (WELLBUTRIN) tablet 100 mg     ceFEPIme (MAXIPIME) 2 g vial to attach to  ml bag for ADULTS or 50 ml bag for PEDS     dextrose 10% infusion     glucose gel 15-30 g    Or     dextrose 50 % injection 25-50 mL    Or     glucagon injection 1 mg     fiber modular (NUTRISOURCE FIBER) packet 1 packet     flumazenil (ROMAZICON) injection 0.2 mg     fluticasone-vilanterol (BREO ELLIPTA) 200-25 MCG/INH inhaler 1 puff     folic acid (FOLVITE) tablet 1 mg     furosemide (LASIX) tablet 20 mg     gabapentin (NEURONTIN) capsule 600 mg     heparin ANTICOAGULANT injection 5,000 Units     heparin lock flush 10 UNIT/ML injection 2-5 mL     Lidocaine (LIDOCARE) 4 % Patch 1 patch     lidocaine (LMX4) kit     lidocaine 1 % 0.1-1 mL     lidocaine patch in PLACE     magnesium sulfate 2 g in water intermittent infusion     magnesium sulfate 4 g in 100 mL sterile water (premade)     melatonin tablet 3 mg     montelukast (SINGULAIR) tablet 10 mg     multivitamins w/minerals (CERTAVITE) liquid 15 mL     naloxone (NARCAN) injection 0.1-0.4 mg     nicotine (NICODERM CQ) 14 MG/24HR 24 hr patch 1 patch     nicotine Patch in Place     ondansetron (ZOFRAN-ODT) ODT tab 4 mg    Or     ondansetron (ZOFRAN) injection 4 mg     oxyCODONE (ROXICODONE) tablet 5 mg     pantoprazole (PROTONIX) 2 mg/mL suspension 40 " "mg     phosphorus tablet 250 mg (PHOSPHA 250 NEUTRAL) per tablet 500 mg     polyethylene glycol (MIRALAX) Packet 17 g     potassium chloride (KLOR-CON) Packet 20-40 mEq     potassium chloride 10 mEq in 100 mL intermittent infusion with 10 mg lidocaine     potassium chloride 10 mEq in 100 mL sterile water intermittent infusion (premix)     potassium chloride 20 mEq in 50 mL intermittent infusion     potassium chloride ER (KLOR-CON M) CR tablet 20-40 mEq     propranolol (INDERAL) tablet 20 mg     rifaximin (XIFAXAN) tablet 550 mg     senna-docusate (SENOKOT-S/PERICOLACE) 8.6-50 MG per tablet 2 tablet    Or     senna-docusate (SENOKOT-S/PERICOLACE) 8.6-50 MG per tablet 1 tablet     sodium chloride (PF) 0.9% PF flush 5-50 mL     sodium phosphate 15 mmol in D5W intermittent infusion     sodium phosphate 20 mmol in D5W intermittent infusion     sodium phosphate 25 mmol in D5W intermittent infusion     spironolactone (ALDACTONE) tablet 50 mg     vitamin B1 (THIAMINE) tablet 100 mg             Review of Systems:   A complete review of systems was performed and is negative except as noted in the HPI           Physical Exam:   /52 (BP Location: Left arm)   Pulse 68   Temp 96.3  F (35.7  C) (Oral)   Resp 16   Ht 1.676 m (5' 6\")   Wt 87.5 kg (192 lb 14.4 oz)   SpO2 96%   BMI 31.14 kg/m    Wt:   Wt Readings from Last 2 Encounters:   06/21/20 87.5 kg (192 lb 14.4 oz)   06/12/20 83.9 kg (185 lb)      Constitutional: cooperative, no acute distress  Eyes: Sclera anicteric/injected  Neck: supple, thyroid normal size  CV: trace edema  Respiratory: Unlabored breathing  Abd:  Moderately distended, +bs, no hepatosplenomegaly, nontender, no peritoneal signs  Skin: warm, perfused, no jaundice  Neuro: AAO x 3, No asterixis  Psych: Normal affect           Data:   Labs and imaging below were independently reviewed and interpreted    BMP  Recent Labs   Lab 06/23/20  0630 06/22/20  0552 06/21/20  0643 06/20/20  1138    141 " 145* 144   POTASSIUM 4.5 4.0 4.0 4.4   CHLORIDE 110* 109 112* 114*   BEE 8.9 8.7 9.0 8.6   CO2 25 28 25 25   BUN 57* 57* 62* 57*   CR 1.46* 1.49* 1.46* 1.46*   * 104* 145* 152*     CBC  Recent Labs   Lab 06/23/20  0630 06/22/20  0552  06/21/20  0643 06/20/20  1138   WBC 16.8* 17.2*  --  17.0* 15.9*   RBC 2.45* 2.46*  --  2.50* 2.53*   HGB 7.8* 7.7*   < > 7.9* 7.8*   HCT 26.0* 25.8*  --  26.2* 27.0*   * 105*  --  105* 107*   MCH 31.8 31.3  --  31.6 30.8   MCHC 30.0* 29.8*  --  30.2* 28.9*   RDW Dimorphic population - unable to calculate Dimorphic population - unable to calculate  --  27.8* 28.1*    164  --  150 134*    < > = values in this interval not displayed.     INR  Recent Labs   Lab 06/21/20  0643   INR 1.44*     LFTs  Recent Labs   Lab 06/23/20  0630 06/22/20  0552 06/21/20  0643 06/20/20  1138   ALKPHOS 738* 753* 796* 746*  738*   AST 78* 80* 59* 53*  51*   ALT 57 51 37 28  29   BILITOTAL 0.9 0.9 1.1 1.3  1.2   PROTTOTAL 6.1* 6.1* 6.4* 6.0*  6.0*   ALBUMIN 2.0* 2.0* 2.0* 1.9*  1.9*      PANCNo lab results found in last 7 days.    Imaging

## 2020-06-23 NOTE — PLAN OF CARE
Discharge Planner PT   Patient plan for discharge: TCU  Current status: Pt progressing in strength and activity tolerance. CGA-REESE required for safe sit<>stand transfers with a FWW. Pt required further reinforcement and reiteration of L hip precautions. Pt achieved up to 120 ft with FWW and CGA and WC follow. Pt required several standing and seated rest breaks (bouts of 25-50 ft overall).   Barriers to return to prior living situation: Medical, safety with mobility, increased level of assist needed, decreased strength and activity tolerance.  Recommendations for discharge: TCU  Rationale for recommendations: Patient would greatly benefit from continued skilled PT/ OT in TCU setting to progress strength, activity tolerance and to maximize functional independence.       Entered by: Leonor Luna 06/23/2020 4:51 PM

## 2020-06-24 ENCOUNTER — APPOINTMENT (OUTPATIENT)
Dept: SPEECH THERAPY | Facility: CLINIC | Age: 60
End: 2020-06-24
Payer: COMMERCIAL

## 2020-06-24 ENCOUNTER — APPOINTMENT (OUTPATIENT)
Dept: PHYSICAL THERAPY | Facility: CLINIC | Age: 60
End: 2020-06-24
Payer: COMMERCIAL

## 2020-06-24 PROCEDURE — 12000001 ZZH R&B MED SURG/OB UMMC

## 2020-06-24 PROCEDURE — 25000132 ZZH RX MED GY IP 250 OP 250 PS 637: Performed by: NURSE PRACTITIONER

## 2020-06-24 PROCEDURE — 25000132 ZZH RX MED GY IP 250 OP 250 PS 637: Performed by: INTERNAL MEDICINE

## 2020-06-24 PROCEDURE — 97110 THERAPEUTIC EXERCISES: CPT | Mod: GP

## 2020-06-24 PROCEDURE — 25000132 ZZH RX MED GY IP 250 OP 250 PS 637: Performed by: STUDENT IN AN ORGANIZED HEALTH CARE EDUCATION/TRAINING PROGRAM

## 2020-06-24 PROCEDURE — 25000128 H RX IP 250 OP 636: Performed by: NURSE PRACTITIONER

## 2020-06-24 PROCEDURE — 97530 THERAPEUTIC ACTIVITIES: CPT | Mod: GP

## 2020-06-24 PROCEDURE — 25000128 H RX IP 250 OP 636: Performed by: STUDENT IN AN ORGANIZED HEALTH CARE EDUCATION/TRAINING PROGRAM

## 2020-06-24 PROCEDURE — 99233 SBSQ HOSP IP/OBS HIGH 50: CPT | Mod: GC | Performed by: INTERNAL MEDICINE

## 2020-06-24 PROCEDURE — 92526 ORAL FUNCTION THERAPY: CPT | Mod: GN

## 2020-06-24 RX ORDER — FUROSEMIDE 20 MG
20 TABLET ORAL ONCE
Status: COMPLETED | OUTPATIENT
Start: 2020-06-24 | End: 2020-06-24

## 2020-06-24 RX ORDER — LACTULOSE 10 G/15ML
20 SOLUTION ORAL 2 TIMES DAILY
Status: DISCONTINUED | OUTPATIENT
Start: 2020-06-24 | End: 2020-06-25

## 2020-06-24 RX ORDER — FUROSEMIDE 40 MG
40 TABLET ORAL DAILY
Status: DISCONTINUED | OUTPATIENT
Start: 2020-06-25 | End: 2020-06-25 | Stop reason: HOSPADM

## 2020-06-24 RX ORDER — NALTREXONE HYDROCHLORIDE 50 MG/1
50 TABLET, FILM COATED ORAL DAILY
Status: DISCONTINUED | OUTPATIENT
Start: 2020-06-24 | End: 2020-06-25 | Stop reason: HOSPADM

## 2020-06-24 RX ADMIN — Medication 5 MG: at 14:35

## 2020-06-24 RX ADMIN — Medication 5 MG: at 07:53

## 2020-06-24 RX ADMIN — NICOTINE 1 PATCH: 14 PATCH, EXTENDED RELEASE TRANSDERMAL at 07:54

## 2020-06-24 RX ADMIN — PROPRANOLOL HYDROCHLORIDE 20 MG: 10 TABLET ORAL at 07:53

## 2020-06-24 RX ADMIN — LACTULOSE 20 G: 20 SOLUTION ORAL at 21:05

## 2020-06-24 RX ADMIN — RIFAXIMIN 550 MG: 550 TABLET ORAL at 07:53

## 2020-06-24 RX ADMIN — FUROSEMIDE 20 MG: 20 TABLET ORAL at 07:53

## 2020-06-24 RX ADMIN — GABAPENTIN 600 MG: 300 CAPSULE ORAL at 14:35

## 2020-06-24 RX ADMIN — MULTIVITAMIN 15 ML: LIQUID ORAL at 07:53

## 2020-06-24 RX ADMIN — Medication 40 MG: at 07:53

## 2020-06-24 RX ADMIN — FLUTICASONE FUROATE AND VILANTEROL TRIFENATATE 1 PUFF: 200; 25 POWDER RESPIRATORY (INHALATION) at 07:53

## 2020-06-24 RX ADMIN — ENOXAPARIN SODIUM 40 MG: 40 INJECTION SUBCUTANEOUS at 21:06

## 2020-06-24 RX ADMIN — SPIRONOLACTONE 50 MG: 25 TABLET ORAL at 07:53

## 2020-06-24 RX ADMIN — MELATONIN TAB 3 MG 3 MG: 3 TAB at 22:00

## 2020-06-24 RX ADMIN — FOLIC ACID 1 MG: 1 TABLET ORAL at 07:54

## 2020-06-24 RX ADMIN — GABAPENTIN 600 MG: 300 CAPSULE ORAL at 07:53

## 2020-06-24 RX ADMIN — ASPIRIN 81 MG CHEWABLE TABLET 81 MG: 81 TABLET CHEWABLE at 07:52

## 2020-06-24 RX ADMIN — Medication 1 PACKET: at 07:54

## 2020-06-24 RX ADMIN — ACETAMINOPHEN 650 MG: 325 TABLET, FILM COATED ORAL at 00:42

## 2020-06-24 RX ADMIN — MONTELUKAST 10 MG: 10 TABLET, FILM COATED ORAL at 21:06

## 2020-06-24 RX ADMIN — BUPROPION HYDROCHLORIDE 100 MG: 100 TABLET, FILM COATED ORAL at 14:35

## 2020-06-24 RX ADMIN — BUPROPION HYDROCHLORIDE 100 MG: 100 TABLET, FILM COATED ORAL at 05:54

## 2020-06-24 RX ADMIN — NALTREXONE HYDROCHLORIDE 50 MG: 50 TABLET, FILM COATED ORAL at 19:07

## 2020-06-24 RX ADMIN — Medication 5000 UNITS: at 02:30

## 2020-06-24 RX ADMIN — ATORVASTATIN CALCIUM 20 MG: 20 TABLET, FILM COATED ORAL at 07:53

## 2020-06-24 RX ADMIN — PROPRANOLOL HYDROCHLORIDE 20 MG: 10 TABLET ORAL at 21:06

## 2020-06-24 RX ADMIN — Medication 5 MG: at 21:05

## 2020-06-24 RX ADMIN — BUPROPION HYDROCHLORIDE 100 MG: 100 TABLET, FILM COATED ORAL at 21:05

## 2020-06-24 RX ADMIN — DIBASIC SODIUM PHOSPHATE, MONOBASIC POTASSIUM PHOSPHATE AND MONOBASIC SODIUM PHOSPHATE 500 MG: 852; 155; 130 TABLET ORAL at 21:05

## 2020-06-24 RX ADMIN — LIDOCAINE 1 PATCH: 560 PATCH PERCUTANEOUS; TOPICAL; TRANSDERMAL at 15:54

## 2020-06-24 RX ADMIN — CEFEPIME 2 G: 2 INJECTION, POWDER, FOR SOLUTION INTRAVENOUS at 10:09

## 2020-06-24 RX ADMIN — ACETAMINOPHEN 650 MG: 325 TABLET, FILM COATED ORAL at 15:54

## 2020-06-24 RX ADMIN — GABAPENTIN 600 MG: 300 CAPSULE ORAL at 21:05

## 2020-06-24 RX ADMIN — ACETAMINOPHEN 650 MG: 325 TABLET, FILM COATED ORAL at 07:52

## 2020-06-24 RX ADMIN — THIAMINE HCL TAB 100 MG 100 MG: 100 TAB at 07:54

## 2020-06-24 RX ADMIN — ACETAMINOPHEN 650 MG: 325 TABLET, FILM COATED ORAL at 23:00

## 2020-06-24 RX ADMIN — DIBASIC SODIUM PHOSPHATE, MONOBASIC POTASSIUM PHOSPHATE AND MONOBASIC SODIUM PHOSPHATE 500 MG: 852; 155; 130 TABLET ORAL at 07:54

## 2020-06-24 RX ADMIN — ONDANSETRON 4 MG: 4 TABLET, ORALLY DISINTEGRATING ORAL at 03:12

## 2020-06-24 RX ADMIN — FUROSEMIDE 20 MG: 20 TABLET ORAL at 10:10

## 2020-06-24 ASSESSMENT — ACTIVITIES OF DAILY LIVING (ADL)
ADLS_ACUITY_SCORE: 19
ADLS_ACUITY_SCORE: 21
ADLS_ACUITY_SCORE: 23
ADLS_ACUITY_SCORE: 21
ADLS_ACUITY_SCORE: 19
ADLS_ACUITY_SCORE: 21

## 2020-06-24 ASSESSMENT — VISUAL ACUITY: OU: NORMAL ACUITY

## 2020-06-24 NOTE — PROGRESS NOTES
"Social Work Services Progress Note    Hospital Day: 13  Date of Initial Social Work Evaluation:  6/18/2020  Collaborated with:  Medicine MD, pt, sister, SNF Admissions Coordinator's    Data:  SW is following for discharge planning.    Intervention:    - SW received a 6/23/2020 call from Admissions (Maria C) at [facility] stating that pt has been assessed and declined for admit as pt is a current smoker. SW inquired about if they would accept if pt would agree not to smoke and they will not  - SW received a 6/23/2020 call from Admissions (Zulema) at [facility].  Per Zulema, pt assessed and declined for admit as the \"had concerns.\"  SW was unsuccessful in obtaining more specific information as to why pt was declined  - On 6/23/2020, SW attended Medicine rounds and per Juliana Vela, discharge anticipated in 3-5 days, pt with sepsis  - On 6/23/2020, SW received a voice mail message from Admissions (Stormy) for [facility] and for [facility] [place].  Per Stormy, neither facility will accept pt with a nasal tube feeding.    - On 6/23/2020, SW received a call from Admissions at [facility] stating that they aquino not accept pt's with nasal tube feedings and that pt has a prescribed me that they cannot accommodate due to cost.    - On 6/24/2020, SW attended Medicine Rounds. Per Juliana Vela, pt will complete his course of IV antibiotics on 6/27/2020, pt will discharge with a ga.  SW inquired about whether or not pt would discharge with nasal tube feeding, medicine indicated that they would get back to this SW.    - On 6/24/2020, SW spoke with Juliana Snow who states that pt will not discharge on tube feeding.    - On 6/24/2020, SW left messages for Admissions (Stormy) for [facility] [place] as well as for [facility] (Emiliana) informing both that pt will not discharge on tube feedings. SW asked both to call to indicate whether not this impacts their " ability to accept pt for admit.  NIKO asked Emiliana to indicate which med is expensive so that SW can confirm whether or not pt will discharge on the med.   - On 6/24/2020, NIKO met with pt and updated regarding discharge planning.  SW inquired as to when pt last consumed etoh. Pt states that he last consumed etoh 3 weeks ago (SW inquired as SW is trying to determine of pt's history of alcohol abuse is a barrier that SNF's are not stating).  NIKO spoke with pt about making referrals in the [facility], pt voiced agreement. SW also asked pt if this SW could update pt's sister (Rowena) in regards to discharge planning and pt voiced agreement.  - On 6/24/2020, NIKO spoke with Dr. Alejo ([place] [phone]) who states that Rifaximin will be discontinued and discharge is anticipated on 6/25 6/26/2020.    - On 6/24/2020, NIKO spoke with Admissions (Aurea) at [facility] who states that no openings are anticipated through the weekend.   - With pt's verbal permission, NIKO phoned pt's sister (Rowena) and updated. NIKO spoke with Rowena about making TCU referrals in the [facility].  Rowena agreeable and states that pt has had a previous stay at a facility in [place] and at a facility in [place] and either would be acceptable.    - NIKO phoned Admissions for [place] [facility]note 2[facility] and [place] [facility] (Jazz [phone]) and left a message referring pt. Assessment materials faxed. NIKO phoned Admissions (Ailin [phone]) at [facility] in [place], referred pt and faxed assessment materials.      Assessment:  Discharge is anticipated on 6/25 or 6/26/2020.  Pt will not discharge with a tube feeding and pt will not discharge on Rifaximan.  Rehab placement is indicated and pt and sister are agreeable to [facility] placement if [place] and [facility] are unable to take.    Plan:    Anticipated Disposition:  TCU placement    Barriers to d/c plan:  Medical  readiness    Follow Up:  SW will continue to follow.    RUDY Elias  Social Work, 6A  Phone:  [phone]  Pager:  [phone]  6/24/2020

## 2020-06-24 NOTE — PLAN OF CARE
6A  Discharge Planner PT   Patient plan for discharge: TCU  Current status: Pt performed supine > Sit with min A, sit > supine with CGA, cues to adhere to precautions. Limited by fatigue this afternoon, performed standing exercises with verbal and manual cues with seated rest breaks.  Barriers to return to prior living situation: medical status, impaired functional mobility  Recommendations for discharge: TCU  Rationale for recommendations: Pt with deficits in strength, balance, activity tolerance impacting overall functional mobility.        Entered by: Josefina Lozada 06/24/2020 6:01 PM

## 2020-06-24 NOTE — PLAN OF CARE
Status: Pt on 6A s/p L hip replacement ~2 weeks prior to admission. Admitted on 6/12/2020 after multiple falls, sustaining 3-8 R rib fractures with concern for alcohol withdrawal and seizures.  Vitals: VSS on RA  Neuros: AO4, strength BUE 5/5, BLE 4/5. Numbness/tingling to toes bilaterally   IV: PIV SL  Resp/trach: LS diminished at the bases, expiratory wheezes   Diet: DD2 diet with thin liquids, tolerating well   Bowel status: Multiple BMs earlier today   : Chong for wound healing intact with adequate UO  Skin: Penile wound CDI to be changed this evening   Pain: R rib pain controlled with scheduled Tylenol and lidocaine patches   Activity: Up with assist of 1, GB, walker  Plan: Therapies recommending TCU upon discharge. Continue to monitor and follow POC

## 2020-06-24 NOTE — PLAN OF CARE
Status: Admitted 6/12 after sustaining multiple falls after L hip surgery 2 weeks prior to admission. 3-8 R rib fxs with concern for alcohol withdrawal and seizures   Vitals: VSS on RA  Neuros: A+O x4. 5/5 BUE, 4/5 BLE. N/T to bilateral feet at baseline. Can get agitated but resolves quickly   IV: PIV TKO   Resp/trach: Diminished. Dyspnea on exertion  Diet: DD1 w/ nectar thick liquids. Assisted with feeding. Restarted TF @ 50 ml/hr at 0000, tolerated well until complaints of stomach discomfort @ 0300, stopped and gave Zofran x1. Restarted @ 0600 with has tolerated well.  Bowel status: No BM overnight.   : Chong for wound healing  Skin: Wound on penis, WOC following, see orders. Dressing changed this shift d/t small amount of dried bleeding  Pain: Controlled with scheduled tylenol   Activity: A1 GB walker, pivots to chair and commode. Not OOB overnight   Plan: Therapies recommending TCU, Continue to monitor and follow POC

## 2020-06-24 NOTE — PLAN OF CARE
Discharge Planner SLP   Patient plan for discharge: unknown  Current status: Recommend advance diet to mechanical/dental soft with thin liquids. Pt to sit upright for all PO intake and take small bites/sips. Pt wears upper and lower partials for PO intake at baseline, but not present in hospital, so this may be most appropriate diet until able to obtain partials.  Barriers to return to prior living situation: medical status  Recommendations for discharge: will defer to PT/OT  Rationale for recommendations: unclear if Pt will require ongoing ST intervention post discharge       Entered by: Patrizia Saab 06/24/2020 8:50 AM

## 2020-06-24 NOTE — PLAN OF CARE
03  Status: Pt on 6A s/p L hip replacement ~2 weeks prior to admission. Admitted on 6/12/2020 after multiple falls, sustaining 3-8 R rib fractures with concern for alcohol withdrawal and seizures.  Vitals: VSS on RA, dyspnea on exertion, douglas w/ turns.   Neuros: AO x 4. BUE 5/5. BLE 4/5. N/T in both feet, baseline.   IV: PIV TKO.    Resp/trach: Expiratory wheezes, diminished.   Diet: Mechanical/dental soft w/ thin liquids. NG tube removed at 1300.    Bowel status:   : Ga in place for wound healing, ga cares done.   Skin: Wound on uretheal meatus of penis, WOC following. L hip incision, erythema.   Pain: No statements of pain this shift.   Activity: A1, GB and walker. Pt up to chair. Walked unit x 1.   Plan: Continue to monitor and follow plan of care.

## 2020-06-24 NOTE — PLAN OF CARE
Status: Pt on 6A s/p L hip replacement ~2 weeks prior to admission. Admitted on 6/12/2020 after multiple falls, sustaining 3-8 R rib fractures with concern for alcohol withdrawal and seizures  Vitals: VSS  Neuros: A&Ox4. BUE 5/5. BLE 4/5. N/T to bilateral feet at baseline  IV: PIV TKO between abx  Resp/trach: LS diminished, continuous pulse ox d/t dyspnea on exertion  Diet: DD1 w/nectar thick liquids. TF restarted at 2015. Currently running at 25mL/hr and tolerating well. Goal is 50mL/hr  Bowel status: No BM this shift, incontinent of bowel  : Ga in place for wound healing, ga cares done.   Skin: Wound care to urethral meatus completed. Some sanguinous drainage. Microklenz, Triad paste and Xeroform gauze applied. L hip incision intact.  Pain: Pt reported back pain and HA. PRN Oxycodone given x1 with good effect. Scheduled Tylenol given  Activity: Assist of 1 with GB/walker and pivot. Turn/repo q2h  Plan: Continue to monitor and follow POC

## 2020-06-24 NOTE — PROGRESS NOTES
[facility] [place]    Progress Note - [place] 1 Service        Date of Admission:  6/12/2020    Assessment & Plan   Abhijeet Mccauley is a 59 year old male with a history of alcohol use disorder, alcohol withdrawal seizures, EtOH cirrhosis, COPD, ulcerative colitis, CKD III, AML (diagnosed 2008, s/p chemotherapy), and s/p left hip replacement ~2 weeks prior to admission admitted on 6/12/2020 after multiple falls c/b right 3-8 rib fractures with concern for alcohol withdrawal and seizures. His course has been complicated by enterobacter urinary tract infection with an obstructing renal stone now s/p stent placement.     CHANGES TODAY  - Pulled NG tube out and stopped tube feeds  - Speech cleared for mechanical soft diet  - Started naltrexone for alcohol use disorder  - Switch rifaximin to lactulose  - Increase lasix dose to 40 mg daily to better control ascites    Elevated alkaline phosphatase  Moderate to large ascites   Cirrhosis  Persistently elevated alkaline phosphatase to the 700s, likely biliary source given elevated GGT as well. The cause of his elevated alkaline phosphatase is unclear. Differential diagnosis includes infiltrative disease, PBC, and drug induced liver injury. Ultrasound demonstrated cirrhosis changes, no suspicious hepatic lesions, gallbladder without evidence of cholecystitis, but the common bile duct was not visualized. MRCP without evidence of biliary pathology to account for the elevated alkaline phosphatase. In terms of cirrhosis and ascites, paracentesis on 6/22 consistent with ascites from portal hypertension without evidence of SBP. Reported history of esophageal varices, but none documented on last EGD in February.   - GI was consulted for assistance with further workup of elevated alkaline phosphatase, they felt that pursuing further workup (such as EUS with liver biopsy) while inpatient would unlikely , especially in the setting of  recent alcohol use. They recommended close outpatient GI follow up. He's followed with [facility] in the past.   - Switch rifaximin to lactulose  - Increase furosemide to 40 mg daily  - Continue spironolactone 50 mg daily  - Continue propranalol    Acute hypoxic respiratory failure, resolved  Pulmonary edema  Now extubated and weaned to room air. Echo 5/21 with EF 55-60%, trace to mild tricuspid insufficiency, normal IVC with preserved respiratory variability. He does have known ascites and had been on furosemide 20 mg oral daily prior to admission, which was held during his ICU admission in the setting of sepsis.   - 40 mg oral lasix daily  - Daily BMP, Mg  - Therapeutic paracentesis on 6/22 was helpful with respiratory status  - Incentive spirometry  - Daily weights    Urinary tract infection and bacteremia (enterobacter cloacae)  Left upper pole obstructing stone  S/p left ureteral stent placement on 06/13 by Dr. Lino. Definitive stone treatment per urology once acute illness resolves. Being treated with cefepime. Briefly transitioned to ceftriaxone, but now back to cefepime. Unfortunately no options for oral de-escalation of antibiotic therapy given concern with inducible amp C resistance with 3rd generation cephalosporins and the bacteria's resistance to fluoroquinolones and bactrim.   - Continue cefepime for enterobacter and concern for inducible amp C resistance. Will need 14 day course of antibiotics, through 6/27.  - Will need PICC prior to discharge if course has not been completed  - Per urology, no role for continued antibiotics through intervention   - Patient has been scheduled for office visit with Dr. Hale in ~1 month, sister aware  - Please page urology prior to discharge to discuss plan with patient in person    Abx:   -- Cefepime (6/14-6/20, again 6/22-*)  -- Ceftriaxone (6/20-6/21)    Alcohol withdrawal  Alcohol use disorder  - Folic acid and thiamine supplementation  - S/p ativan protocol  which was discontinued on 6/18  - Start naltrexone 50 mg daily, expect an elevation in transaminases in the setting of cirrhosis (which is ok) but will need monitoring as an outpatient    Metabolic encephalopathy  Concern for seizures  Awake and alert, but still intermittently confused. Neurology consulted previously for seizures and altered mental status--they felt seizures were provoked by a combination of alcohol withdrawal, sepsis, and metabolic derangements. Thus, no anti-seizure medication was initiated. vEEG completed 6/14 without subclinical seizures. Encephalopathy most likely multifactorial in the setting of infection, withdrawal, and ICU delirium. There could also be an element of hepatic encephalopathy, particularly given asterixis on exam. Ammonia was within normal limits. OT MoCA score 11/22 (18 and above is normal).     Rib fractures  - Tylenol 650 mg Q8H (2g max in the setting of cirrhosis)  - Lidocaine patches  - Oxycodone 5 mg Q4H PRN  - Continue home baclofen 5 mg TID (reduced from home dose of 10 mg TID)  - Continue home gabapentin 600 mg TID  - PT and OT evaluations    Genital wound  See images under misc tab. Patient denies significant pain. Suspect pressure wound related to prior edema and possible component of candida/intertrigo. Syphilis testing negative. Gonorrhea and chlamydia negative. HSV 1/2 negative.   - WOC nurse consult  - Continue urinary catheter, will need this changed monthly, until these wounds heal. Last changed 6/20, will need to be changed next 7/20/2020    RESOLVED  Hypernatremia- Continue free water flushes to 120 mL Q4H  GENEVIEVE on CKD- Likely secondary to septic shock. Unclear baseline creatinine in times of wellness, but appears to be ~1.7  Bright red blood per rectum- Possibly from rectal tube. No further episodes.    CHRONIC  Acute on chronic macrocytic anemia- Combination of critical illness, liver disease, alcohol use, and recent blood loss.  S/p knee replacement- Per  note from 5/30, recommended 81 mg daily for 42 days (through 7/11)   COPD- continue breo ellipta, singulair, PRN albuterol  Hyperlipidemia- Continue home atorvastatin  Generalized anxiety disorder- Continue home bupropion; holding trazadone and hydroxyzine  Tobacco use- Continue nicotine patch  Gastritis- continue protonix BID     Diet: Mechanical/Dental Soft Diet    Fluids: None  Lines: PIV, urinary catheter for wound healing  DVT Prophylaxis: Heparin SQ  Chong Catheter: in place, indication: Strict 1-2 Hour I&O, Strict 1-2 Hour I&O, Wound Healing  Code Status: Full Code           Disposition Plan   Expected discharge: 2 - 3 days, recommended to transitional care unit once safe disposition plan/ TCU bed available and workup complete.  Entered: Flora Alejo MD 06/24/2020, 4:32 PM       This patient was staffed with Dr. Jama.     Flora Alejo MD  [place] [id number] Service  [facility]  Pager: [phone]  Please see sticky note for cross cover information  ______________________________________________________________________    Interval History   No acute events overnight. Vital signs stable. Feels much better with the NG out of his nose. Doesn't like the heparin shots. No significant nausea. No significant shortness of breath. No chest pain. Interested in starting a medicine for alcohol cravings.     Data reviewed today: I reviewed all medications, new labs and imaging results over the last 24 hours.     Physical Exam   Vital Signs: Temp: 96.6  F (35.9  C) Temp src: Oral BP: 116/61   Heart Rate: 69 Resp: 16 SpO2: 95 % O2 Device: None (Room air)    Weight: 192 lbs 14.44 oz  Constitutional: Awake and alert. Comfortable-appearing. Sitting in a chair.   Eyes: No conjunctival injection or scleral icterus.   ENT: Scattered punctate erythematous lesions on lips, improving.   Respiratory: Saturating well on room air. Clear to auscultation bilaterally.   Cardiovascular:  RRR. Normal S1/S2. No murmurs appreciated.   GI: Moderately distended. Soft. Nontender.   Musculoskeletal: No significant lower extremity edema.   Neurologic: Alert. No facial asymmetry. Moves all extremities appropriately.     Data   Recent Labs   Lab 06/23/20  0630 06/22/20  0552 06/21/20  1843 06/21/20  0643   WBC 16.8* 17.2*  --  17.0*   HGB 7.8* 7.7* 8.0* 7.9*   * 105*  --  105*    164  --  150   INR  --   --   --  1.44*    141  --  145*   POTASSIUM 4.5 4.0  --  4.0   CHLORIDE 110* 109  --  112*   CO2 25 28  --  25   BUN 57* 57*  --  62*   CR 1.46* 1.49*  --  1.46*   ANIONGAP 6 5  --  8   BEE 8.9 8.7  --  9.0   * 104*  --  145*   ALBUMIN 2.0* 2.0*  --  2.0*   PROTTOTAL 6.1* 6.1*  --  6.4*   BILITOTAL 0.9 0.9  --  1.1   ALKPHOS 738* 753*  --  796*   ALT 57 51  --  37   AST 78* 80*  --  59*     No results found for this or any previous visit (from the past 24 hour(s)).

## 2020-06-25 ENCOUNTER — APPOINTMENT (OUTPATIENT)
Dept: SPEECH THERAPY | Facility: CLINIC | Age: 60
End: 2020-06-25
Payer: COMMERCIAL

## 2020-06-25 ENCOUNTER — APPOINTMENT (OUTPATIENT)
Dept: GENERAL RADIOLOGY | Facility: CLINIC | Age: 60
End: 2020-06-25
Attending: STUDENT IN AN ORGANIZED HEALTH CARE EDUCATION/TRAINING PROGRAM
Payer: COMMERCIAL

## 2020-06-25 ENCOUNTER — APPOINTMENT (OUTPATIENT)
Dept: PHYSICAL THERAPY | Facility: CLINIC | Age: 60
End: 2020-06-25
Payer: COMMERCIAL

## 2020-06-25 VITALS
RESPIRATION RATE: 16 BRPM | DIASTOLIC BLOOD PRESSURE: 63 MMHG | TEMPERATURE: 96.7 F | OXYGEN SATURATION: 95 % | HEART RATE: 65 BPM | BODY MASS INDEX: 31 KG/M2 | HEIGHT: 66 IN | SYSTOLIC BLOOD PRESSURE: 120 MMHG | WEIGHT: 192.9 LBS
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
## 2020-06-25 PROBLEM — Z71.89 COORDINATION OF COMPLEX CARE: Status: ACTIVE | Noted: 2017-09-14

## 2020-06-25 LAB
ANION GAP SERPL CALCULATED.3IONS-SCNC: 12 MMOL/L (ref 3–14)
BUN SERPL-MCNC: 43 MG/DL (ref 7–30)
CALCIUM SERPL-MCNC: 8.7 MG/DL (ref 8.5–10.1)
CHLORIDE SERPL-SCNC: 109 MMOL/L (ref 94–109)
CO2 SERPL-SCNC: 17 MMOL/L (ref 20–32)
CREAT SERPL-MCNC: 1.49 MG/DL (ref 0.66–1.25)
GFR SERPL CREATININE-BSD FRML MDRD: 50 ML/MIN/{1.73_M2}
GLUCOSE SERPL-MCNC: 76 MG/DL (ref 70–99)
MAGNESIUM SERPL-MCNC: 2.1 MG/DL (ref 1.6–2.3)
POTASSIUM SERPL-SCNC: 4.5 MMOL/L (ref 3.4–5.3)
SODIUM SERPL-SCNC: 138 MMOL/L (ref 133–144)

## 2020-06-25 PROCEDURE — 25000132 ZZH RX MED GY IP 250 OP 250 PS 637: Performed by: NURSE PRACTITIONER

## 2020-06-25 PROCEDURE — 40000275 ZZH STATISTIC RCP TIME EA 10 MIN

## 2020-06-25 PROCEDURE — 25000125 ZZHC RX 250: Performed by: NURSE PRACTITIONER

## 2020-06-25 PROCEDURE — 25000128 H RX IP 250 OP 636: Performed by: STUDENT IN AN ORGANIZED HEALTH CARE EDUCATION/TRAINING PROGRAM

## 2020-06-25 PROCEDURE — 94640 AIRWAY INHALATION TREATMENT: CPT

## 2020-06-25 PROCEDURE — 25000132 ZZH RX MED GY IP 250 OP 250 PS 637: Performed by: INTERNAL MEDICINE

## 2020-06-25 PROCEDURE — 97530 THERAPEUTIC ACTIVITIES: CPT | Mod: GP | Performed by: PHYSICAL THERAPIST

## 2020-06-25 PROCEDURE — 36415 COLL VENOUS BLD VENIPUNCTURE: CPT | Performed by: STUDENT IN AN ORGANIZED HEALTH CARE EDUCATION/TRAINING PROGRAM

## 2020-06-25 PROCEDURE — 25000128 H RX IP 250 OP 636: Performed by: NURSE PRACTITIONER

## 2020-06-25 PROCEDURE — 97116 GAIT TRAINING THERAPY: CPT | Mod: GP | Performed by: PHYSICAL THERAPIST

## 2020-06-25 PROCEDURE — 80048 BASIC METABOLIC PNL TOTAL CA: CPT | Performed by: STUDENT IN AN ORGANIZED HEALTH CARE EDUCATION/TRAINING PROGRAM

## 2020-06-25 PROCEDURE — 40000986 XR CHEST PORT 1 VW

## 2020-06-25 PROCEDURE — C1751 CATH, INF, PER/CENT/MIDLINE: HCPCS

## 2020-06-25 PROCEDURE — 25000132 ZZH RX MED GY IP 250 OP 250 PS 637: Performed by: STUDENT IN AN ORGANIZED HEALTH CARE EDUCATION/TRAINING PROGRAM

## 2020-06-25 PROCEDURE — 25000125 ZZHC RX 250: Performed by: STUDENT IN AN ORGANIZED HEALTH CARE EDUCATION/TRAINING PROGRAM

## 2020-06-25 PROCEDURE — 92526 ORAL FUNCTION THERAPY: CPT | Mod: GN | Performed by: SPEECH-LANGUAGE PATHOLOGIST

## 2020-06-25 PROCEDURE — 97110 THERAPEUTIC EXERCISES: CPT | Mod: GP | Performed by: PHYSICAL THERAPIST

## 2020-06-25 PROCEDURE — 36569 INSJ PICC 5 YR+ W/O IMAGING: CPT

## 2020-06-25 PROCEDURE — 83735 ASSAY OF MAGNESIUM: CPT | Performed by: STUDENT IN AN ORGANIZED HEALTH CARE EDUCATION/TRAINING PROGRAM

## 2020-06-25 PROCEDURE — 99239 HOSP IP/OBS DSCHRG MGMT >30: CPT | Mod: GC | Performed by: INTERNAL MEDICINE

## 2020-06-25 RX ORDER — ONDANSETRON 4 MG/1
4 TABLET, ORALLY DISINTEGRATING ORAL EVERY 12 HOURS PRN
Status: ON HOLD | DISCHARGE
Start: 2020-06-25 | End: 2020-07-18

## 2020-06-25 RX ORDER — BACLOFEN 10 MG/1
5 TABLET ORAL 2 TIMES DAILY
DISCHARGE
Start: 2020-06-25 | End: 2020-06-29 | Stop reason: DRUGHIGH

## 2020-06-25 RX ORDER — FOLIC ACID 1 MG/1
1 TABLET ORAL DAILY
DISCHARGE
Start: 2020-06-26 | End: 2020-09-03

## 2020-06-25 RX ORDER — BACLOFEN 10 MG/1
5 TABLET ORAL 2 TIMES DAILY
DISCHARGE
Start: 2020-06-25 | End: 2020-06-25

## 2020-06-25 RX ORDER — ASPIRIN 81 MG/1
81 TABLET, CHEWABLE ORAL DAILY
DISCHARGE
Start: 2020-06-26 | End: 2020-06-25

## 2020-06-25 RX ORDER — HEPARIN SODIUM,PORCINE 10 UNIT/ML
2-5 VIAL (ML) INTRAVENOUS
Status: DISCONTINUED | OUTPATIENT
Start: 2020-06-25 | End: 2020-06-25 | Stop reason: HOSPADM

## 2020-06-25 RX ORDER — LANOLIN ALCOHOL/MO/W.PET/CERES
3 CREAM (GRAM) TOPICAL
Status: ON HOLD | DISCHARGE
Start: 2020-06-25 | End: 2020-07-18

## 2020-06-25 RX ORDER — ACETAMINOPHEN 325 MG/1
650 TABLET ORAL 3 TIMES DAILY
DISCHARGE
Start: 2020-06-25 | End: 2020-06-25

## 2020-06-25 RX ORDER — LANOLIN ALCOHOL/MO/W.PET/CERES
100 CREAM (GRAM) TOPICAL DAILY
DISCHARGE
Start: 2020-06-26 | End: 2021-08-10

## 2020-06-25 RX ORDER — PANTOPRAZOLE SODIUM 40 MG/1
40 TABLET, DELAYED RELEASE ORAL
Status: DISCONTINUED | OUTPATIENT
Start: 2020-06-25 | End: 2020-06-25 | Stop reason: HOSPADM

## 2020-06-25 RX ORDER — LANOLIN ALCOHOL/MO/W.PET/CERES
100 CREAM (GRAM) TOPICAL DAILY
DISCHARGE
Start: 2020-06-26 | End: 2020-06-25

## 2020-06-25 RX ORDER — ALUMINA, MAGNESIA, AND SIMETHICONE 2400; 2400; 240 MG/30ML; MG/30ML; MG/30ML
30 SUSPENSION ORAL EVERY 6 HOURS PRN
Status: DISCONTINUED | OUTPATIENT
Start: 2020-06-25 | End: 2020-06-25 | Stop reason: HOSPADM

## 2020-06-25 RX ORDER — LACTULOSE 10 G/15ML
10 SOLUTION ORAL 2 TIMES DAILY
Status: DISCONTINUED | OUTPATIENT
Start: 2020-06-25 | End: 2020-06-25 | Stop reason: HOSPADM

## 2020-06-25 RX ORDER — ALUMINA, MAGNESIA, AND SIMETHICONE 2400; 2400; 240 MG/30ML; MG/30ML; MG/30ML
30 SUSPENSION ORAL EVERY 6 HOURS PRN
DISCHARGE
Start: 2020-06-25 | End: 2021-03-30

## 2020-06-25 RX ORDER — POLYETHYLENE GLYCOL 3350 17 G/17G
17 POWDER, FOR SOLUTION ORAL DAILY PRN
DISCHARGE
Start: 2020-06-25 | End: 2020-07-06

## 2020-06-25 RX ORDER — NALTREXONE HYDROCHLORIDE 50 MG/1
50 TABLET, FILM COATED ORAL DAILY
DISCHARGE
Start: 2020-06-26 | End: 2020-07-13

## 2020-06-25 RX ORDER — ACETAMINOPHEN 325 MG/1
650 TABLET ORAL 3 TIMES DAILY
Status: ON HOLD | DISCHARGE
Start: 2020-06-25 | End: 2020-07-21

## 2020-06-25 RX ORDER — LACTULOSE 10 G/15ML
10 SOLUTION ORAL 2 TIMES DAILY
DISCHARGE
Start: 2020-06-25 | End: 2020-06-29 | Stop reason: DRUGHIGH

## 2020-06-25 RX ORDER — ASPIRIN 81 MG/1
81 TABLET, CHEWABLE ORAL DAILY
DISCHARGE
Start: 2020-06-26 | End: 2020-07-06

## 2020-06-25 RX ORDER — NICOTINE 21 MG/24HR
1 PATCH, TRANSDERMAL 24 HOURS TRANSDERMAL DAILY
DISCHARGE
Start: 2020-06-26 | End: 2020-08-13

## 2020-06-25 RX ORDER — MULTIPLE VITAMINS W/ MINERALS TAB 9MG-400MCG
1 TAB ORAL DAILY
Status: DISCONTINUED | OUTPATIENT
Start: 2020-06-25 | End: 2020-06-25 | Stop reason: HOSPADM

## 2020-06-25 RX ORDER — LIDOCAINE 4 G/G
1 PATCH TOPICAL EVERY 24 HOURS
Status: ON HOLD | DISCHARGE
Start: 2020-06-25 | End: 2020-07-18

## 2020-06-25 RX ORDER — LIDOCAINE 40 MG/G
CREAM TOPICAL
Status: DISCONTINUED | OUTPATIENT
Start: 2020-06-25 | End: 2020-06-25 | Stop reason: HOSPADM

## 2020-06-25 RX ORDER — FUROSEMIDE 40 MG
40 TABLET ORAL DAILY
DISCHARGE
Start: 2020-06-26 | End: 2020-06-29

## 2020-06-25 RX ORDER — POLYETHYLENE GLYCOL 3350 17 G/17G
17 POWDER, FOR SOLUTION ORAL DAILY PRN
DISCHARGE
Start: 2020-06-25 | End: 2020-06-25

## 2020-06-25 RX ADMIN — FOLIC ACID 1 MG: 1 TABLET ORAL at 09:13

## 2020-06-25 RX ADMIN — GABAPENTIN 600 MG: 300 CAPSULE ORAL at 09:13

## 2020-06-25 RX ADMIN — ALUMINUM HYDROXIDE, MAGNESIUM HYDROXIDE, AND DIMETHICONE 30 ML: 400; 400; 40 SUSPENSION ORAL at 04:10

## 2020-06-25 RX ADMIN — BUPROPION HYDROCHLORIDE 100 MG: 100 TABLET, FILM COATED ORAL at 09:13

## 2020-06-25 RX ADMIN — ACETAMINOPHEN 650 MG: 325 TABLET, FILM COATED ORAL at 09:13

## 2020-06-25 RX ADMIN — NALTREXONE HYDROCHLORIDE 50 MG: 50 TABLET, FILM COATED ORAL at 09:12

## 2020-06-25 RX ADMIN — ASPIRIN 81 MG CHEWABLE TABLET 81 MG: 81 TABLET CHEWABLE at 09:13

## 2020-06-25 RX ADMIN — FLUTICASONE FUROATE AND VILANTEROL TRIFENATATE 1 PUFF: 200; 25 POWDER RESPIRATORY (INHALATION) at 09:12

## 2020-06-25 RX ADMIN — GABAPENTIN 600 MG: 300 CAPSULE ORAL at 13:37

## 2020-06-25 RX ADMIN — PANTOPRAZOLE SODIUM 40 MG: 40 TABLET, DELAYED RELEASE ORAL at 12:15

## 2020-06-25 RX ADMIN — DIBASIC SODIUM PHOSPHATE, MONOBASIC POTASSIUM PHOSPHATE AND MONOBASIC SODIUM PHOSPHATE 500 MG: 852; 155; 130 TABLET ORAL at 09:12

## 2020-06-25 RX ADMIN — THIAMINE HCL TAB 100 MG 100 MG: 100 TAB at 09:13

## 2020-06-25 RX ADMIN — Medication 5 MG: at 13:37

## 2020-06-25 RX ADMIN — SPIRONOLACTONE 50 MG: 25 TABLET ORAL at 09:13

## 2020-06-25 RX ADMIN — PROPRANOLOL HYDROCHLORIDE 20 MG: 10 TABLET ORAL at 09:13

## 2020-06-25 RX ADMIN — NICOTINE 1 PATCH: 14 PATCH, EXTENDED RELEASE TRANSDERMAL at 09:14

## 2020-06-25 RX ADMIN — Medication 5 MG: at 09:12

## 2020-06-25 RX ADMIN — LIDOCAINE HYDROCHLORIDE 1 ML: 10 INJECTION, SOLUTION EPIDURAL; INFILTRATION; INTRACAUDAL; PERINEURAL at 11:30

## 2020-06-25 RX ADMIN — ATORVASTATIN CALCIUM 20 MG: 20 TABLET, FILM COATED ORAL at 09:13

## 2020-06-25 RX ADMIN — FUROSEMIDE 40 MG: 40 TABLET ORAL at 09:13

## 2020-06-25 RX ADMIN — ONDANSETRON 4 MG: 4 TABLET, ORALLY DISINTEGRATING ORAL at 12:16

## 2020-06-25 RX ADMIN — CEFEPIME 2 G: 2 INJECTION, POWDER, FOR SOLUTION INTRAVENOUS at 00:19

## 2020-06-25 RX ADMIN — LACTULOSE 20 G: 20 SOLUTION ORAL at 09:12

## 2020-06-25 RX ADMIN — CEFEPIME 2 G: 2 INJECTION, POWDER, FOR SOLUTION INTRAVENOUS at 12:15

## 2020-06-25 RX ADMIN — ALBUTEROL SULFATE 2.5 MG: 2.5 SOLUTION RESPIRATORY (INHALATION) at 01:03

## 2020-06-25 ASSESSMENT — VISUAL ACUITY: OU: NORMAL ACUITY

## 2020-06-25 ASSESSMENT — ACTIVITIES OF DAILY LIVING (ADL)
ADLS_ACUITY_SCORE: 20

## 2020-06-25 NOTE — PLAN OF CARE
Physical Therapy Discharge Summary    Reason for therapy discharge:    Discharged to transitional care facility.    Progress towards therapy goal(s). See goals on Care Plan in [facility] electronic health record for goal details.  Goals partially met.  Barriers to achieving goals:   discharge from facility.    Therapy recommendation(s):    Continued therapy is recommended.  Rationale/Recommendations:  pt would benefit from TCU to maximize strength, endurance, and IND.

## 2020-06-25 NOTE — PROGRESS NOTES
[facility]  PRIMARY CARE PROVIDER AND CLINIC: Chidi Valenzuela MD, [address] [facility] [address]; Phone: [phone]; Fax: [phone]  Chief Complaint   Patient presents with     Hospital F/U     [facility] Record Number: [id number]  Place of Service where encounter took place: [facility] [[id number]]    Abhijeet Mccauley is a 59 year old (1960), admitted to the above facility from  [facility]. Hospital stay 06/12/20 through 06/25/20. Admitted to this facility for rehab, medical management and nursing care.    HPI:    HPI information obtained from: facility chart records, facility staff, patient report and [facility] chart review.   Brief Summary of Hospital Course: Abhijeet Mccauley has a past medical history of chronic alcohol use, hypertension, COPD, polyneuropathy, esophageal varices,  Polyneuropathy, ulcerative colitis, CKD3, avascular necrosis L hip S/P L total hip arthroplasty a few weeks ago, anemia and GIB, AML S/P chemo in 2011 with complete resolution, COPD.  S/he was admitted to the hospital with several falls, found down in his garage and found to have altered mental status, current alcohol use, and began seizing in the ED as well as R rib fractures 3-8, acute kidney injury.  He was intubated and sent to [facility] for ongoing care.  S/he underwent supportive care, alcohol withdrawal protocol requiring ativan . The hospital stay was complicated with new findings of portal hypertension, alcoholic cirrhosis of the liver S/P paracentesis, chronic elevated liver enzymes, BRBPR with ABL anemia on chronic anemia, penile wound of uncertain etiology.      Updates on Status Since Skilled nursing Admission: Abhijeet reports he knows he is chrystal to be alive, reports he wants to quit drinking and will need help to accomplish this.  He reports minima lpain today, slept well last night. He reports itching back from  detergents used in the hospital. Nursing reports needing evaluation for ongoing care needs of penile wound.       CODE STATUS/ADVANCE DIRECTIVES DISCUSSION: CPR/Full code   Patient's living condition: lives with family, sister, at her home   ALLERGIES: Blood transfusion related (informational only); Famotidine; Pepcid; Vancomycin; Cyclobenzaprine; Hydrocodone-acetaminophen; Methocarbamol; and Vfend  PAST MEDICAL HISTORY:  has a past medical history of Alcohol dependence (H), Alcoholic cirrhosis of liver (H), AML (acute myeloid leukemia) in remission (H), Chronic obstructive pulmonary disease (5/17/2018), COPD (chronic obstructive pulmonary disease) (H), History of pulmonary embolus (PE), Hypertension, PUD (peptic ulcer disease), Tobacco dependence, and Ulcerative colitis (H). He also has no past medical history of Complication of anesthesia, Diabetes (H), Heart disease, PONV (postoperative nausea and vomiting), or Sleep apnea.  PAST SURGICAL HISTORY:  has a past surgical history that includes Esophagoscopy, gastroscopy, duodenoscopy (EGD), combined (N/A, 2/8/2018); TOTAL KNEE ARTHROPLASTY (Left); Laceration repair (Right); Esophagoscopy, gastroscopy, duodenoscopy (EGD), combined (N/A, 3/18/2018); Bone marrow biopsy, bone specimen, needle/trocar (N/A, 6/12/2018); Phacoemulsification with standard intraocular lens implant (Left, 7/1/2019); Phacoemulsification with standard intraocular lens implant (Right, 7/29/2019); Esophagoscopy, gastroscopy, duodenoscopy (EGD), combined (N/A, 2/28/2020); Arthroplasty hip (Left, 5/29/2020); Cystoscopy, retrogrades, insert stent ureter(s), combined (Left, 6/13/2020); Percutaneous nephrostomy (Left, 6/13/2020); IR Paracentesis (6/22/2020); and PICC/Midline Placement (Left, 06/25/2020).  FAMILY HISTORY: family history includes Coronary Artery Disease in his father; Hypertension in his mother.  SOCIAL HISTORY:  reports that he has been smoking cigarettes. He has a 15.00 pack-year  smoking history. He has never used smokeless tobacco. He reports current alcohol use. He reports current drug use. Drug: Marijuana.    Post Discharge Medication Reconciliation Status: discharge medications reconciled and changed, per note/orders (see AVS)    Current Outpatient Medications   Medication Sig Dispense Refill     acetaminophen (TYLENOL) 325 MG tablet Take 2 tablets (650 mg) by mouth 3 times daily       albuterol (VENTOLIN HFA) 108 (90 Base) MCG/ACT inhaler Inhale 2 puffs into the lungs every 4 hours as needed for shortness of breath / dyspnea or wheezing 18 g 11     alum & mag hydroxide-simethicone (MAALOX  ES) 400-400-40 MG/5ML SUSP suspension Take 30 mLs by mouth every 6 hours as needed for indigestion or heartburn       aspirin (ASA) 81 MG chewable tablet Take 1 tablet (81 mg) by mouth daily       atorvastatin 20 MG PO tablet Take 1 tablet (20 mg) by mouth daily 90 tablet 3     baclofen (LIORESAL) 10 MG tablet Take 0.5 tablets (5 mg) by mouth 2 times daily       buPROPion (WELLBUTRIN SR) 150 MG 12 hr tablet Take 150 mg by mouth 2 times daily       ceFEPIme (MAXIPIME) 2 G vial Inject 2 g into the vein every 12 hours for 2 days 80 mL 0     diclofenac (VOLTAREN) 1 % topical gel PLACE 2 GRAMS ONTO THE SKIN FOUR TIMES A DAY AS NEEDED FOR MODERATE PAIN 100 g 11     DULERA 200-5 MCG/ACT inhaler INHALE TWO PUFFS BY MOUTH TWICE A DAY 13 g 0     folic acid (FOLVITE) 1 MG tablet Take 1 tablet (1 mg) by mouth daily       furosemide (LASIX) 40 MG tablet Take 1 tablet (40 mg) by mouth daily       gabapentin (NEURONTIN) 300 MG capsule Take 600 mg by mouth 3 times daily       lactulose (CHRONULAC) 10 GM/15ML solution Take 15 mLs (10 g) by mouth 2 times daily       Lidocaine (LIDOCARE) 4 % Patch Place 1 patch onto the skin every 24 hours To prevent lidocaine toxicity, patient should be patch free for 12 hrs daily.       melatonin 3 MG tablet Take 1 tablet (3 mg) by mouth nightly as needed for sleep       montelukast  "(SINGULAIR) 10 MG tablet Take 1 tablet (10 mg) by mouth At Bedtime 90 tablet 3     naltrexone (DEPADE/REVIA) 50 MG tablet Take 1 tablet (50 mg) by mouth daily       nicotine (NICODERM CQ) 14 MG/24HR 24 hr patch Place 1 patch onto the skin daily       nicotine 2 MG MT lozenge Place 1 lozenge (2 mg) inside cheek every hour as needed for smoking cessation 108 lozenge 11     ondansetron (ZOFRAN-ODT) 4 MG ODT tab Take 1 tablet (4 mg) by mouth every 12 hours as needed for nausea or vomiting       pantoprazole (PROTONIX) 40 MG EC tablet Take 1 tablet (40 mg) by mouth 2 times daily 120 tablet 0     polyethylene glycol (MIRALAX) 17 g packet Take 17 g by mouth daily as needed for constipation       propranolol (INDERAL) 20 MG tablet Take 1 tablet (20 mg) by mouth 2 times daily 180 tablet 3     senna-docusate (SENOKOT-S/PERICOLACE) 8.6-50 MG tablet Take 1-2 tablets by mouth daily as needed for constipation Take while on oral narcotics to prevent or treat constipation. 10 tablet 0     spironolactone (ALDACTONE) 50 MG tablet Take 50 mg by mouth daily       vitamin B1 (THIAMINE) 100 MG tablet Take 1 tablet (100 mg) by mouth daily       order for DME Equipment being ordered: 4 wheel walker 1 Units 0     order for DME Equipment being ordered: 2 pairs thigh high compression stockings, strength per patient preference 2 Units 1     order for DME Equipment being ordered: Compression Stockings TWO (2) Pairs, 15-20 mm Hg. 2 Package prn     order for DME Equipment being ordered: bed pull up bar 1 Units 0     order for DME Equipment being ordered: shower chair 1 Device 0     ROS:  10 point ROS of systems including Constitutional, Eyes, Respiratory, Cardiovascular, Gastroenterology, Genitourinary, Integumentary, Musculoskeletal, Psychiatric were all negative except for pertinent positives noted in my HPI.    Vitals:  /56   Pulse 67   Temp 97.8  F (36.6  C)   Resp 18   Ht 1.676 m (5' 6\")   Wt 78.7 kg (173 lb 8 oz)   SpO2 94%   " BMI 28.00 kg/m    Exam:  GENERAL APPEARANCE:  Alert, in no acute distress   HEAD:  Normal, normocephalic, atraumatic  ENT:  Mouth and posterior oropharynx normal, moist mucous membranes, hearing acuity - within normal limits   EYE EXAM:  EOM, conjunctivae, lids, pupils and irises normal   CHEST/RESP:  respiratory effort normal, no respiratory distress, lung sounds CTA    CV:  Rate and rhythm reg, no murmur/rub/gallop, no peripheral edema  ABD:  Mild distention, mild fluid shift  M/S:   extremities normal, gait abnormal-transfers with minimal assist, digits and nails within normal limits   SKIN:  See pics below, penis with small fissure near the juncture of the glans and shaft, ventrally located.  This is about 1 cm in length and about 0.2 cm deep, painful at the base  NEUROLOGIC EXAM: Normal gross motor movement, tone and coordination. No tremor. Cranial nerves 2-12 are normal tested and grossly at patient's baseline  PSYCH:  Alert and oriented to person-place-time, affect pleasant              Lab/Diagnostic data:  [facility] labs in [facility] reviewed by me today.     ASSESSMENT/PLAN:  Sepsis with acute renal failure and septic shock, due to unspecified organism, unspecified acute renal failure type (H)  Urinary tract infection without hematuria, site unspecified  He has indwelling ga cath right now, completing course of IV antibiotics for complicated UTI.  Afebrile, feeling well.     Uncomplicated alcohol dependence (H)  He reports he is motivated to stop drinking, will need resources to aid him.  He is on naltrexone now, as well as folic acid, thiamine.    Alcoholic cirrhosis of liver with ascites (H)  Portal Hypertension  Newer finding of cirrhosis with ascites, S/P paracentesis.  Now on lactulose, furosemide, spironolactone, propranolol.   -goal for 2-3 soft BMs per day   -monitor daily bowel movements  -consider increase in spironolactone if kidney function stabilizes    Acute Kidney Injury  CKD (chronic kidney  disease) stage 3, GFR 30-59 ml/min (H)  Baseline creat about 1.7. trended up to creat 1.93, now back at baseline.   -Avoid nephrotoxic medications  -Renal dosing of medications  -monitor kidney function 6/29/20      Avascular necrosis of bone of hip, left (H)  Status post total hip replacement, left  Recent total hip arthroplasty due to avascular necrosis.  The incision is CDI and well healed.    -therapy to evaluate and treat    Recurrent falls  Generalized weakness  Closed fracture of multiple ribs of right side with routine healing, subsequent encounter  Weakness, pain, and falling.  Fracture ribs with pain, using lidocaine patch for pain control.      BRBPR (bright red blood per rectum)  Universal ulcerative (chronic) colitis(556.6) (H)  Secondary esophageal varices with bleeding (H)  Iron deficiency anemia due to chronic blood loss  Episode of BRBPR thought due to rectal tube, patient with ulcerative colitis history and esophageal varices S/P banding. Baseline hemoglobin per review appears to be about 7-9, most recent at 7.8.  -check hemoglobin 6/29/20       AIDP (acute inflammatory demyelinating polyneuropathy) (H)  Chronic bilateral low back pain without sciatica  Patient with chronic polyneuropathy, LBP on gabapentin, baclofen with good relief of pain today.    -trial of gradual decrease of baclofen as tolerated    Acute myeloid leukemia in remission (H)  Per review of history, S/P chemo and in remission    Open wound of penis, subsequent encounter  Chong catheter in place on admission  Suspect open wound of penis is related to pressure due to extensive illness and ICU stay, intubation, ascites.      Chronic obstructive pulmonary disease, unspecified COPD type (H)  On singulair, dulera and prn albuterol inhaler.  Stable respiratory status now.     Tobacco dependence syndrome  Chronic smoker of about 1/2 ppd, on nicoderm now.    -encourage smoking cessation        transcribed by : Miranda  Gopi  1. Diagnoses clarified on OSR  2. Maximum tylenol per 24 hours not to exceed 2000 mg   3. Lab draw 6/29/20    CMP - dx: cirrhosis liver    CBC - dx: anemia  4. Eucerin cream (or covered alternative) at bedtime to back - dx: xerosis  5. Clarify dressing change to penis wound daily and prn    Cleanse     Triad paste    Xeroform gauze    Kerlix over gauze with small amount of tape to hold in place  6. Record BM's Q shift: number and consistency - dx: liver cirrhosis    Total time spent with patient visit at the skilled nursing facility was 45 min including patient visit and review of past records. Greater than 50% of total time spent with counseling and coordinating care due to multiple medical comorbid conditions, discussion re alcohol cessation resources, and goals of care.     Electronically signed by:  LOPEZ Campbell CNP

## 2020-06-25 NOTE — PROGRESS NOTES
Social Work Services Discharge Note      Patient Name:  Abhijeet Mccauley     Anticipated Discharge Date:  6/25/2020    Discharge Disposition:   [facility] ([phone])    Following MD:  Facility  Assignment     Pre-Admission Screening (PAS) online form has been completed.  The Level of Care (LOC) is:  Determined  Confirmation Code is:  [id number].  Patient/caregiver informed of referral to [facility] Line for Pre-Admission Screening for skilled nursing facility (SNF) placement and to expect a phone call post discharge from SNF.     Additional Services/Equipment Arranged:  SW confirmed readiness for discharge with Dr. Alejo (Mar[place] 1).  SW confirmed acceptance for admit to [facility] with Admissions for [facility] SNF's (Ophelia [phone]).  SW arranged for [facility] ([place]ake [phone]) to provide w/c transport at 2pm.       Patient / Family response to discharge plan:  Pt and sister (Rowena) voice understanding of the discharge plan and agreement with the discharge plan.     Persons notified of above discharge plan:  Pt, sister (Rowena), 6A nursing and Dr. Alejo    Staff Discharge Instructions:  Please fax discharge orders and signed hard scripts for any controlled substances (SW will complete this task).  Please print a packet and send with patient.     CTS Handoff completed:  YES    Medicare Notice of Rights provided to the patient/family:  NO, pt Admission Facesheet, pt is not on Medicare.    RUDY Elias  Social Work, 6A  Phone:  [phone]  Pager:  [phone]  6/25/2020

## 2020-06-25 NOTE — DISCHARGE SUMMARY
[facility], [place]  Discharge Summary - Medicine & Pediatrics       Date of Admission:  6/12/2020  Date of Discharge:  6/25/2020  Discharging Provider: Dr. Unger  Discharge Service: Juliana 1    Discharge Diagnoses   Right 3-8 rib fractures  Alcohol withdrawal with seizures  Urinary tract infection and bacteremia (enterobacter cloacae)  Left upper pole obstructing stone  Cirrhosis with elevated alkaline phosphatase and ascites  Alcohol use disorder  Genital wound  Acute hypoxic respiratory failure, resolved  Metabolic encephalopathy, resolved  Acute kidney injury on chronic kidney disease, resolved  Bright red blood per rectum, resolved    Follow-ups Needed After Discharge   - Follow up with Nursing Home physician in 3 days for BMP. Consider uptitration of spironolactone if BMP stable. Also recommend slow weaning of baclofen, last weaned 6/25.   - Will need monthly monitoring of transaminases and LFTs due to initiation of naltrexone in the setting of chronic cirrhosis. Expect an elevation of transaminases, but should be monitored.   - When wound on penis heals, can remove ga  - Follow up with Hepatology in 1 month  - Follow up with Urology in July as scheduled    Unresulted Labs Ordered in the Past 30 Days of this Admission     Date and Time Order Name Status Description    6/21/2020 1017 fluid culture - Aerobic Bacterial Preliminary           Discharge Disposition   Discharged to short-term care facility  Condition at discharge: Fair  Patient ready to discharge to a skilled nursing facility as soon as possible in order to create capacity for patients related to the COVID-19 pandemic.    Hospital Course   Abhijeet Mccauley is a 59 year old male with a history of alcohol use disorder, alcohol withdrawal seizures, EtOH cirrhosis, COPD, CKD III, AML (diagnosed 2008, s/p chemotherapy, complete remission >10 years), and s/p left hip replacement ~2 weeks prior to admission. He was admitted  on 6/12/2020 after a fall with altered mental status, sepsis secondary to a urinary source, possible seizure, and alcohol withdrawal.  The following problems were addressed during his hospitalization:    Alcohol withdrawal  Alcohol use disorder  Required intubation and ICU admission initially for withdrawal.   - Folic acid and thiamine supplementation  - S/p ativan protocol which was discontinued on 6/18  - Naltrexone 50 mg daily, expect an elevation in transaminases in the setting of cirrhosis (which is ok) but will need monitoring as an outpatient    Metabolic encephalopathy  Concern for seizures  Neurology consulted previously for seizures and altered mental status--they felt seizures were provoked by a combination of alcohol withdrawal, sepsis, and metabolic derangements. Thus, no anti-seizure medication was initiated. Video EEG completed 6/14 without subclinical seizures. Encephalopathy most likely multifactorial in the setting of infection, withdrawal, and ICU delirium. There could also be an element of hepatic encephalopathy, particularly given asterixis on exam. OT MoCA score 11/22 (18 and above is normal).     Elevated alkaline phosphatase  Moderate to large ascites   Cirrhosis  Persistently elevated alkaline phosphatase to the 700s, likely biliary source given elevated GGT as well. The cause of his elevated alkaline phosphatase is unclear. Ultrasound demonstrated cirrhosis changes, no suspicious hepatic lesions, gallbladder without evidence of cholecystitis, but the common bile duct was not visualized. MRCP without evidence of biliary pathology to account for the elevated alkaline phosphatase. In terms of cirrhosis and ascites, paracentesis on 6/22 consistent with ascites from portal hypertension without evidence of SBP. Reported history of esophageal varices, but none documented on last EGD in February.   - GI was consulted for assistance with further workup of elevated alkaline phosphatase, they felt  that pursuing further workup (such as EUS with liver biopsy) while inpatient would unlikely , especially in the setting of recent alcohol use. They recommended close outpatient gastroenterology follow up. Referral was placed at discharge, recommend follow up in 1 month.   - Continue lactulose 10 g BID, uptitrate as needed for mental status and to maintain 3 soft stools daily  - Continue furosemide to 40 mg daily  - Continue spironolactone 50 mg daily - consider uptitration at a future date  - Continue propranalol     Acute hypoxic respiratory failure, resolved  Pulmonary edema  Initially required intubation for respiratory failure in the setting of sepsis as well as mental status. After extubation, required oxygen, but subsequently weaned to room air. X-rays with bilateral opacities consistent with pulmonary edema. Echo 5/21 with EF 55-60%, trace to mild tricuspid insufficiency, normal IVC with preserved respiratory variability.   - Therapeutic paracentesis on 6/22 was helpful with respiratory status     Urinary tract infection and bacteremia (enterobacter cloacae)  Left upper pole obstructing stone  S/p left ureteral stent placement on 06/13 by Dr. Lino. Definitive stone treatment per urology once acute illness resolves. Being treated with cefepime. Briefly transitioned to ceftriaxone, but now back to cefepime. Unfortunately no options for oral de-escalation of antibiotic therapy given concern with inducible amp C resistance with 3rd generation cephalosporins and the bacteria's resistance to fluoroquinolones and bactrim.   - Continue cefepime for enterobacter and concern for inducible amp C resistance. Will need 14 day course of antibiotics, through 6/27.  - [facility]ICC for completion of antibiotics at [facility]  - Per urology, no role for continued antibiotics through intervention   - Patient has been scheduled for office visit with Dr. Hale in ~1 month, sister aware  - Please page urology prior to  discharge to discuss plan with patient in person     Abx:   -- Cefepime (6/14-6/20, again 6/22-6/27)  -- Ceftriaxone (6/20-6/21)     Rib fractures  - Tylenol 650 mg Q8H (2g max in the setting of cirrhosis)  - Lidocaine patch daily  - Through ICU stay, patient on baclofen 5 mg TID - reduced to 5 mg BID on discharge. Continue to wean slowly.   - Continue home gabapentin 600 mg TID  - PT and OT     Genital wound  See images under misc tab. Patient denies significant pain. Suspect pressure wound related to prior edema and possible component of candida/intertrigo. Syphilis testing negative. Gonorrhea and chlamydia negative. HSV 1/2 negative.   - Wound cares per [facility] nurse  - Continue urinary catheter, will need this changed at least monthly, until these wounds heal. Last changed 6/20, will need to be changed next prior to 7/20/2020     RESOLVED  GENEVIEVE on CKD- Likely secondary to septic shock. Unclear baseline creatinine in times of wellness, but appears to be ~1.7  Bright red blood per rectum- Possibly from rectal tube. No further episodes.     CHRONIC  Acute on chronic macrocytic anemia- Combination of critical illness, liver disease, alcohol use, and recent blood loss.  S/p hip replacement- Per note from 5/30, recommended 81 mg daily for 42 days (through 7/11)   COPD- Continue baseline inhalers and PRN albuterol  Hyperlipidemia- Continue home atorvastatin  Generalized anxiety disorder- Continue home bupropion; holding trazadone and hydroxyzine  Tobacco use- Continue nicotine patch  Gastritis- Continue protonix BID     Diet: Mechanical/Dental Soft Diet  Snacks/Supplements Adult: Magic Cup; With Meals      Consultations This Hospital Stay   NEUROLOGY GENERAL ADULT IP CONSULT  SOCIAL WORK IP CONSULT  PHYSICAL THERAPY ADULT IP CONSULT  OCCUPATIONAL THERAPY ADULT IP CONSULT  MEDICATION HISTORY IP PHARMACY CONSULT  UROLOGY IP CONSULT  INTERVENTIONAL RADIOLOGY ADULT/PEDS IP CONSULT  VASCULAR ACCESS CARE ADULT IP  CONSULT  VASCULAR ACCESS CARE ADULT IP CONSULT  NUTRITION SERVICES ADULT IP CONSULT  PHARMACY IP CONSULT  NUTRITION SERVICES ADULT IP CONSULT  REGIONAL ANESTHESIA PAIN SERVICE ADULT IP CONSULT  WOUND OSTOMY CONTINENCE NURSE  IP CONSULT  SWALLOW EVAL SPEECH PATH AT BEDSIDE IP CONSULT  VASCULAR ACCESS CARE ADULT IP CONSULT  INTERVENTIONAL RADIOLOGY ADULT/PEDS IP CONSULT  OCCUPATIONAL THERAPY ADULT IP CONSULT  VASCULAR ACCESS ADULT IP CONSULT  GI PANCREATICOBILIARY ADULT IP CONSULT  HEMATOLOGY ADULT IP CONSULT  VASCULAR ACCESS ADULT IP CONSULT  PHYSICAL THERAPY ADULT IP CONSULT  OCCUPATIONAL THERAPY ADULT IP CONSULT  SPEECH LANGUAGE PATH ADULT IP CONSULT    Code Status   Full Code       The patient was discussed with Dr. Unger.     MD Juliana Fritz 1 Service  [facility]  Pager: [phone]  ______________________________________________________________________    Physical Exam   Vital Signs: Temp: 96.7  F (35.9  C) Temp src: Oral BP: 120/63 Pulse: 65 Heart Rate: 67 Resp: 16 SpO2: 95 % O2 Device: None (Room air) Oxygen Delivery: 1 LPM  Weight: 192 lbs 14.44 oz  Constitutional: Awake and alert. Comfortable-appearing. Sitting in a chair.   Eyes: No conjunctival injection or scleral icterus.   ENT: Scattered punctate erythematous lesions on lips, improving.   Respiratory: Saturating well on room air. Clear to auscultation bilaterally.   Cardiovascular: RRR. Normal S1/S2. No murmurs appreciated.   GI: Moderately distended. Soft. Nontender.   Musculoskeletal: No significant lower extremity edema.   Neurologic: Alert. No facial asymmetry. Moves all extremities appropriately.       Primary Care Physician   Chidi Valenzuela    Discharge Orders      GASTROENTEROLOGY ADULT REF CONSULT ONLY      Reason for your hospital stay    You were admitted after a fall with rib fractures and alcohol withdrawal.     Adult [facility] Follow-up and recommended labs and tests     General info  for SNF    Length of Stay Estimate: Short Term Care: Estimated # of Days <30  Condition at Discharge: Improving  Level of care:skilled   Rehabilitation Potential: Good  Admission H&P remains valid and up-to-date: Yes  Recent Chemotherapy: N/A  Use Nursing Home Standing Orders: Yes     Mantoux instructions    Give two-step Mantoux (PPD) Per Facility Policy Yes     Ga catheter    To straight gravity drainage. Change catheter every 2 weeks and PRN for leaking or decreased uring output with signs of bladder distention. DO NOT change catheter without a specific MD order IF diagnosis of benign prostatic hypertrophy (BPH), neurogenic bladder, or other urological conditions    When the wound on the penis heals, the ga catheter can be removed. Last changed 6/20.     IV access    PICC.     Follow Up and recommended labs and tests    Follow up with skilled nursing physician.  The following labs/tests are recommended: BMP in 3 days to evaluate for electrolyte abnormalities on lasix and spironolactone. Consider increasing spironolactone to 100 mg if stable to prevent ascites.    Follow up with Dr. Hale, at the [facility], on 7/21/2020 for urology follow up.     Follow up with Gastroenterology at the [facility], within 1 month for cirrhosis follow up    Appointments on [place] and/or [facility] (with [facility] or [facility] provider or service). Call [phone] if you haven't heard regarding these appointments within 7 days of discharge.     Activity - Up with nursing assistance     Additional Discharge Instructions    Nursing home physician:  - Consider uptitrating spironolactone  - Baclofen was scheduled TID while hospitalized, now decreased to BID on 6/25. Please continue weaning this medication slowly.     Wound care    Wound Location:  penis    Wound History: posterior wound first noted 6/15 as a blister  Measurements (length x width x depth, in cm)   posterior: 1.2  x 1.7  x  0.4 cm    Urethral:  0.5 x 0.2 x 0.2  Wound Base: 100 % slough-yellow and adherent  Palpation of the wound bed: normal   Periwound skin: erythema- blanchable  Periwound Temperature: normal   Drainage: none  Odor: none  Pain: mild    Instructions:    penile wounds: BID   Cleanse wounds with microklenz spray and gauze.  Apply triad paste to posterior wound bed   Loosely wrap the penis with strip of xeroform gauze     Full Code     Physical Therapy Adult Consult    Evaluate and treat as clinically indicated.    Reason:  Pt with deficits in strength, balance, activity tolerance impacting overall functional mobility, requiring continued therapy to maximize functional independence.     Occupational Therapy Adult Consult    Evaluate and treat as clinically indicated.    Reason:  decreased independence in activities of daily living.     Speech Language Path Adult Consult    Evaluate and treat as clinically indicated.    Reason:  continued short-course SLP for dysphagia to optimize safe return to least restrictive or baseline diet     Fall precautions     Hip precautions    Recent left hip replacement (end of May/early June)     Advance Diet as Tolerated    Follow this diet upon discharge: Orders Placed This Encounter      Snacks/Supplements Adult: Magic Cup; With Meals      Mechanical/Dental Soft Diet       Significant Results and Procedures   Most Recent 3 CBC's:  Recent Labs   Lab Test 06/23/20  0630 06/22/20  0552 06/21/20  1843 06/21/20  0643   WBC 16.8* 17.2*  --  17.0*   HGB 7.8* 7.7* 8.0* 7.9*   * 105*  --  105*    164  --  150     Most Recent 3 BMP's:  Recent Labs   Lab Test 06/25/20  0607 06/23/20  0630 06/22/20  0552    141 141   POTASSIUM 4.5 4.5 4.0   CHLORIDE 109 110* 109   CO2 17* 25 28   BUN 43* 57* 57*   CR 1.49* 1.46* 1.49*   ANIONGAP 12 6 5   BEE 8.7 8.9 8.7   GLC 76 128* 104*     Most Recent 2 LFT's:  Recent Labs   Lab Test 06/23/20  0630 06/22/20  0552   AST 78* 80*   ALT 57 51   ALKPHOS 738*  753*   BILITOTAL 0.9 0.9       Discharge Medications   Current Discharge Medication List      START taking these medications    Details   alum & mag hydroxide-simethicone (MAALOX  ES) 400-400-40 MG/5ML SUSP suspension Take 30 mLs by mouth every 6 hours as needed for indigestion or heartburn  Qty:      Associated Diagnoses: Alcoholic cirrhosis of liver with ascites (H)      aspirin (ASA) 81 MG chewable tablet Take 1 tablet (81 mg) by mouth daily  Qty:      Associated Diagnoses: Status post total hip replacement, left      ceFEPIme (MAXIPIME) 2 G vial Inject 2 g into the vein every 12 hours for 2 days  Qty: 80 mL, Refills: 0    Associated Diagnoses: Urinary tract infection without hematuria, site unspecified      lactulose (CHRONULAC) 10 GM/15ML solution Take 15 mLs (10 g) by mouth 2 times daily  Qty:      Comments: Hold if 3 or more bowel movements in 24 hours. If no bowel movement in 24 hours, contact provider to consider medication titration.  Associated Diagnoses: Alcoholic cirrhosis of liver with ascites (H)      Lidocaine (LIDOCARE) 4 % Patch Place 1 patch onto the skin every 24 hours To prevent lidocaine toxicity, patient should be patch free for 12 hrs daily.    Associated Diagnoses: Sepsis with acute renal failure and septic shock, due to unspecified organism, unspecified acute renal failure type (H)      naltrexone (DEPADE/REVIA) 50 MG tablet Take 1 tablet (50 mg) by mouth daily  Qty:      Associated Diagnoses: Alcohol use disorder, severe, dependence (H)      nicotine (NICODERM CQ) 14 MG/24HR 24 hr patch Place 1 patch onto the skin daily  Qty:      Associated Diagnoses: Encounter for tobacco use cessation counseling      ondansetron (ZOFRAN-ODT) 4 MG ODT tab Take 1 tablet (4 mg) by mouth every 12 hours as needed for nausea or vomiting    Associated Diagnoses: Alcoholic cirrhosis of liver with ascites (H)      vitamin B1 (THIAMINE) 100 MG tablet Take 1 tablet (100 mg) by mouth daily  Qty:      Associated  Diagnoses: Alcohol use disorder, severe, dependence (H)         CONTINUE these medications which have CHANGED    Details   acetaminophen (TYLENOL) 325 MG tablet Take 2 tablets (650 mg) by mouth 3 times daily  Qty:      Associated Diagnoses: Closed fracture of multiple ribs of right side, initial encounter      baclofen (LIORESAL) 10 MG tablet Take 0.5 tablets (5 mg) by mouth 2 times daily  Qty:      Associated Diagnoses: Closed fracture of multiple ribs of right side, initial encounter      folic acid (FOLVITE) 1 MG tablet Take 1 tablet (1 mg) by mouth daily  Qty:      Associated Diagnoses: Alcohol use disorder, severe, dependence (H)      furosemide (LASIX) 40 MG tablet Take 1 tablet (40 mg) by mouth daily  Qty:      Associated Diagnoses: Alcoholic cirrhosis of liver with ascites (H)      polyethylene glycol (MIRALAX) 17 g packet Take 17 g by mouth daily as needed for constipation  Qty:      Associated Diagnoses: Other constipation         CONTINUE these medications which have NOT CHANGED    Details   melatonin 3 MG tablet Take 1 tablet (3 mg) by mouth nightly as needed for sleep  Qty:      Associated Diagnoses: Insomnia, unspecified type      albuterol (VENTOLIN HFA) 108 (90 Base) MCG/ACT inhaler Inhale 2 puffs into the lungs every 4 hours as needed for shortness of breath / dyspnea or wheezing  Qty: 18 g, Refills: 11    Comments: Pharmacy may dispense brand covered by insurance (Proair, or proventil or ventolin or generic albuterol inhaler)  Associated Diagnoses: Mild intermittent asthma without complication      atorvastatin 20 MG PO tablet Take 1 tablet (20 mg) by mouth daily  Qty: 90 tablet, Refills: 3    Associated Diagnoses: ASCVD (arteriosclerotic cardiovascular disease)      buPROPion (WELLBUTRIN SR) 150 MG 12 hr tablet Take 150 mg by mouth 2 times daily      diclofenac (VOLTAREN) 1 % topical gel PLACE 2 GRAMS ONTO THE SKIN FOUR TIMES A DAY AS NEEDED FOR MODERATE PAIN  Qty: 100 g, Refills: 11    Associated  Diagnoses: Bilateral hand pain      DULERA 200-5 MCG/ACT inhaler INHALE TWO PUFFS BY MOUTH TWICE A DAY  Qty: 13 g, Refills: 0    Associated Diagnoses: Mild intermittent asthma without complication; SOB (shortness of breath)      gabapentin (NEURONTIN) 300 MG capsule Take 600 mg by mouth 3 times daily      montelukast (SINGULAIR) 10 MG tablet Take 1 tablet (10 mg) by mouth At Bedtime  Qty: 90 tablet, Refills: 3    Associated Diagnoses: Seasonal allergic rhinitis due to pollen      nicotine 2 MG MT lozenge Place 1 lozenge (2 mg) inside cheek every hour as needed for smoking cessation  Qty: 108 lozenge, Refills: 11    Associated Diagnoses: Tobacco dependence syndrome      !! order for DME Equipment being ordered: 4 wheel walker  Qty: 1 Units, Refills: 0    Associated Diagnoses: Peripheral polyneuropathy; Primary osteoarthritis of left knee      !! order for DME Equipment being ordered: 2 pairs thigh high compression stockings, strength per patient preference  Qty: 2 Units, Refills: 1    Associated Diagnoses: Peripheral edema      !! order for DME Equipment being ordered: Compression Stockings TWO (2) Pairs, 15-20 mm Hg.  Qty: 2 Package, Refills: prn    Associated Diagnoses: Bilateral leg edema      !! order for DME Equipment being ordered: bed pull up bar  Qty: 1 Units, Refills: 0    Associated Diagnoses: Generalized weakness      !! order for DME Equipment being ordered: shower chair  Qty: 1 Device, Refills: 0    Associated Diagnoses: Acute myeloid leukemia in remission (H)      pantoprazole (PROTONIX) 40 MG EC tablet Take 1 tablet (40 mg) by mouth 2 times daily  Qty: 120 tablet, Refills: 0    Associated Diagnoses: Gastroesophageal reflux disease without esophagitis      propranolol (INDERAL) 20 MG tablet Take 1 tablet (20 mg) by mouth 2 times daily  Qty: 180 tablet, Refills: 3    Associated Diagnoses: Alcoholic cirrhosis of liver with ascites (H)      senna-docusate (SENOKOT-S/PERICOLACE) 8.6-50 MG tablet Take 1-2  "tablets by mouth daily as needed for constipation Take while on oral narcotics to prevent or treat constipation.  Qty: 10 tablet, Refills: 0    Comments: While taking narcotics  Associated Diagnoses: Avascular necrosis (H)      spironolactone (ALDACTONE) 50 MG tablet Take 50 mg by mouth daily       !! - Potential duplicate medications found. Please discuss with provider.      STOP taking these medications       aspirin (ASA) 81 MG EC tablet Comments:   Reason for Stopping:         desonide (DESOWEN) 0.05 % external cream Comments:   Reason for Stopping:         docusate sodium (COLACE) 100 MG tablet Comments:   Reason for Stopping:         hydrOXYzine (ATARAX) 25 MG tablet Comments:   Reason for Stopping:         loratadine (CLARITIN) 10 MG tablet Comments:   Reason for Stopping:         oxyCODONE (ROXICODONE) 5 MG tablet Comments:   Reason for Stopping:         potassium chloride ER (KLOR-CON M) 10 MEQ CR tablet Comments:   Reason for Stopping:         rifaximin (XIFAXAN) 550 MG TABS tablet Comments:   Reason for Stopping:         traMADol (ULTRAM) 50 MG tablet Comments:   Reason for Stopping:         traZODone (DESYREL) 50 MG tablet Comments:   Reason for Stopping:         trolamine salicylate (ASPERCREME) 10 % external cream Comments:   Reason for Stopping:             Allergies   Allergies   Allergen Reactions     Blood Transfusion Related (Informational Only)      Patient has a history of a clinically significant antibody against RBC antigens.  A delay in compatible RBCs may occur.     Famotidine Unknown and Other (See Comments)     Severe abdominal cramps     Cyclobenzaprine Hives     Methocarbamol Other (See Comments)     Made pt's \"face break out\"     Pepcid Cramps     Severe abdominal cramps     Vancomycin Other (See Comments)     hallucinations     Vfend Other (See Comments)     IV - cold sweats, Iron tablet makes him nauseated and stomach ached     Hydrocodone-Acetaminophen Rash     "

## 2020-06-25 NOTE — PLAN OF CARE
Admit for fall, R rib fx 3-8. ETOH withdrawal. Neuros BLE 4/5 with N/T toes baseline. VSS on RA. Penis wound care per WOC recommendations. BID. Mepilex on coccyx. T/R q2h. Abdomin distended. PIV SL. Mechanical soft diet. Chong for wound healing. Up 1/gb. Tylenol for pain. Plan TCU when ready. Continue to monitor.

## 2020-06-25 NOTE — PROCEDURES
[facility], [place]    Single Lumen PICC Placement    Date/Time: 6/25/2020 11:35 AM  Performed by: Laury Zapien RN  Authorized by: Flora Alejo MD     UNIVERSAL PROTOCOL   Site Marked: Yes  Prior Images Obtained and Reviewed:  Yes  Required items: Required blood products, implants, devices and special equipment available    Patient identity confirmed:  Verbally with patient, arm band and hospital-assigned identification number  NA - No sedation, light sedation, or local anesthesia  Confirmation Checklist:  Patient's identity using two indicators, relevant allergies, procedure was appropriate and matched the consent or emergent situation and correct equipment/implants were available  Time out: Immediately prior to the procedure a time out was called    Universal Protocol: the Joint Commission Universal Protocol was followed    Preparation: Patient was prepped and draped in usual sterile fashion           ANESTHESIA    Anesthesia: Local infiltration  Local Anesthetic:  Lidocaine 1% without epinephrine  Anesthetic Total (mL):  1      SEDATION    Patient Sedated: No        Preparation: skin prepped with ChloraPrep  Skin prep agent: skin prep agent completely dried prior to procedure  Sterile barriers: maximum sterile barriers were used: cap, mask, sterile gown, sterile gloves, and large sterile sheet  Hand hygiene: hand hygiene performed prior to central venous catheter insertion  Type of line used: PICC and Power PICC  Catheter type: single lumen  Lumen type: valved  Catheter size: 4 Fr  : Bioflo   Lot number: [id number]  Placement method: venipuncture, MST, ultrasound and tip confirmation system  Number of attempts: 1  Successful placement: yes  Orientation: left  Location: basilic vein (vein diameter 0.50)  Arm circumference: adults 10 cm  Extremity circumference: 30  Visible catheter length: 2  Internal length: 48 cm  Total catheter length: 50  Dressing and securement:  chlorhexidine disc applied, dressing applied, line secured, occlusive dressing applied, securement device, site cleaned, statlock and sterile dressing applied  Post procedure assessment: placement verified by x-ray and blood return through all ports  PROCEDURE   Patient Tolerance:  Patient tolerated the procedure well with no immediate complications

## 2020-06-25 NOTE — PLAN OF CARE
Occupational Therapy Discharge Summary    Reason for therapy discharge:    Discharged to transitional care facility.    Progress towards therapy goal(s). See goals on Care Plan in [facility] electronic health record for goal details.  Goals partially met.  Barriers to achieving goals:   discharge from facility.    Therapy recommendation(s):    Continued therapy is recommended.  Rationale/Recommendations:  to increase ADL/IADL independence and safety prior to return home.

## 2020-06-25 NOTE — PLAN OF CARE
Discharge Planner SLP   Patient plan for discharge: rehab facility  Current status: Patient seen for dysphagia treatment. RN reported no swallowing concerns with pills taken with sips of water this morning.  Note intermittent coughing or throat clearing after larger sips of thin water by cup or straw. No overt aspiration signs occurred with small controlled sips of water by straw or cup or with solid texture. No oral residue remained. Note soft burping after oral intake.     Recommend continue mechanical soft/dental soft diet with thin liquids given swallow strategies (up in chair, small single sips/bites, slow rate). Monitor for persistent/increased signs/symptoms of aspiration and thicken liquids to nectar thick consistency if occur.    Barriers to return to prior living situation: medical status; weakness; confusion  Recommendations for discharge: rehab facility  Rationale for recommendations: continued short-course SLP for dysphagia to optimize safe return to least restrictive or baseline diet       Entered by: Renita Zhang 06/25/2020 11:43 AM     Speech Language Therapy Discharge Summary    Reason for therapy discharge:    Discharged to transitional care facility.    Progress towards therapy goal(s). See goals on Care Plan in [facility] electronic health record for goal details.  Goals partially met.  Barriers to achieving goals:   discharge from facility.    Therapy recommendation(s):    Continued therapy is recommended.  Rationale/Recommendations:  See above note.

## 2020-06-25 NOTE — PLAN OF CARE
6A  Discharge Planner PT   Patient plan for discharge: TCU  Current status: pt needs SBA for transfers with FWW, unable to recall any hip precautions and needing frequent reminders during transfers. Pt ambulates ~100ft x 2 with FWW, cues for increased step length.   Barriers to return to prior living situation: medical status, impaired functional mobility  Recommendations for discharge: TCU  Rationale for recommendations: Pt with deficits in strength, balance, activity tolerance impacting overall functional mobility, requiring continued therapy to maximize functional independence.       Entered by: Kierra Alegre 06/25/2020 9:27 AM

## 2020-06-25 NOTE — PLAN OF CARE
03  Status: Pt on 6A s/p L hip replacement ~2 weeks prior to admission. Admitted on 6/12/2020 after multiple falls, sustaining 3-8 R rib fractures with concern for alcohol withdrawal and seizures.  Vitals: VSS on RA, dyspnea on exertion, douglas w/ turns.   Neuros: AO x 4. BUE 5/5. BLE 4/5. N/T in both feet, baseline.   IV: PIV removed. PICC SL'd.   Resp/trach: Expiratory wheezes, diminished.   Diet: Mechanical/dental soft w/ thin liquids. Adequate intake.   Bowel status: Large BM, continent.   : Ga in place for wound healing, ga cares done.   Skin: Wound on uretheal meatus of penis, wound care done today. L hip incision, erythema.   Pain: No statements of pain this shift.   Activity: A1, GB and walker. Pt up to chair. Walked unit x 1.   Plan: Pt was d/c to [facility] around 1400. Transportation set up by social work. All belongings sent w/ patient. Nurse-to-nurse report given. Packet sent w/ patient.

## 2020-06-26 ENCOUNTER — TELEPHONE (OUTPATIENT)
Dept: GASTROENTEROLOGY | Facility: CLINIC | Age: 60
End: 2020-06-26

## 2020-06-26 ENCOUNTER — PATIENT OUTREACH (OUTPATIENT)
Dept: CARE COORDINATION | Facility: CLINIC | Age: 60
End: 2020-06-26

## 2020-06-26 ENCOUNTER — NURSING HOME VISIT (OUTPATIENT)
Dept: GERIATRICS | Facility: CLINIC | Age: 60
End: 2020-06-26
Payer: COMMERCIAL

## 2020-06-26 VITALS
BODY MASS INDEX: 27.88 KG/M2 | DIASTOLIC BLOOD PRESSURE: 56 MMHG | TEMPERATURE: 97.8 F | HEIGHT: 66 IN | RESPIRATION RATE: 18 BRPM | HEART RATE: 67 BPM | WEIGHT: 173.5 LBS | OXYGEN SATURATION: 94 % | SYSTOLIC BLOOD PRESSURE: 124 MMHG

## 2020-06-26 DIAGNOSIS — N17.9 SEPSIS WITH ACUTE RENAL FAILURE AND SEPTIC SHOCK, DUE TO UNSPECIFIED ORGANISM, UNSPECIFIED ACUTE RENAL FAILURE TYPE (H): Primary | ICD-10-CM

## 2020-06-26 DIAGNOSIS — M87.052 AVASCULAR NECROSIS OF BONE OF HIP, LEFT (H): ICD-10-CM

## 2020-06-26 DIAGNOSIS — N39.0 URINARY TRACT INFECTION WITHOUT HEMATURIA, SITE UNSPECIFIED: ICD-10-CM

## 2020-06-26 DIAGNOSIS — F10.20 UNCOMPLICATED ALCOHOL DEPENDENCE (H): ICD-10-CM

## 2020-06-26 DIAGNOSIS — G89.29 CHRONIC BILATERAL LOW BACK PAIN WITHOUT SCIATICA: ICD-10-CM

## 2020-06-26 DIAGNOSIS — K62.5 BRBPR (BRIGHT RED BLOOD PER RECTUM): ICD-10-CM

## 2020-06-26 DIAGNOSIS — S22.41XD CLOSED FRACTURE OF MULTIPLE RIBS OF RIGHT SIDE WITH ROUTINE HEALING, SUBSEQUENT ENCOUNTER: ICD-10-CM

## 2020-06-26 DIAGNOSIS — J44.9 CHRONIC OBSTRUCTIVE PULMONARY DISEASE, UNSPECIFIED COPD TYPE (H): ICD-10-CM

## 2020-06-26 DIAGNOSIS — G61.0 AIDP (ACUTE INFLAMMATORY DEMYELINATING POLYNEUROPATHY) (H): ICD-10-CM

## 2020-06-26 DIAGNOSIS — Z97.8 FOLEY CATHETER IN PLACE ON ADMISSION: ICD-10-CM

## 2020-06-26 DIAGNOSIS — R53.1 GENERALIZED WEAKNESS: ICD-10-CM

## 2020-06-26 DIAGNOSIS — D50.0 IRON DEFICIENCY ANEMIA DUE TO CHRONIC BLOOD LOSS: ICD-10-CM

## 2020-06-26 DIAGNOSIS — I10 ESSENTIAL HYPERTENSION: ICD-10-CM

## 2020-06-26 DIAGNOSIS — M54.50 CHRONIC BILATERAL LOW BACK PAIN WITHOUT SCIATICA: ICD-10-CM

## 2020-06-26 DIAGNOSIS — N18.30 CKD (CHRONIC KIDNEY DISEASE) STAGE 3, GFR 30-59 ML/MIN (H): ICD-10-CM

## 2020-06-26 DIAGNOSIS — A41.9 SEPSIS WITH ACUTE RENAL FAILURE AND SEPTIC SHOCK, DUE TO UNSPECIFIED ORGANISM, UNSPECIFIED ACUTE RENAL FAILURE TYPE (H): Primary | ICD-10-CM

## 2020-06-26 DIAGNOSIS — R65.21 SEPSIS WITH ACUTE RENAL FAILURE AND SEPTIC SHOCK, DUE TO UNSPECIFIED ORGANISM, UNSPECIFIED ACUTE RENAL FAILURE TYPE (H): Primary | ICD-10-CM

## 2020-06-26 DIAGNOSIS — K51.00 UNIVERSAL ULCERATIVE (CHRONIC) COLITIS(556.6) (H): ICD-10-CM

## 2020-06-26 DIAGNOSIS — I85.11 SECONDARY ESOPHAGEAL VARICES WITH BLEEDING (H): ICD-10-CM

## 2020-06-26 DIAGNOSIS — C92.01 ACUTE MYELOID LEUKEMIA IN REMISSION (H): ICD-10-CM

## 2020-06-26 DIAGNOSIS — R29.6 RECURRENT FALLS: ICD-10-CM

## 2020-06-26 DIAGNOSIS — K70.31 ALCOHOLIC CIRRHOSIS OF LIVER WITH ASCITES (H): ICD-10-CM

## 2020-06-26 DIAGNOSIS — F17.200 TOBACCO DEPENDENCE SYNDROME: ICD-10-CM

## 2020-06-26 DIAGNOSIS — S31.20XD OPEN WOUND OF PENIS, SUBSEQUENT ENCOUNTER: ICD-10-CM

## 2020-06-26 DIAGNOSIS — Z96.642 STATUS POST TOTAL HIP REPLACEMENT, LEFT: ICD-10-CM

## 2020-06-26 PROCEDURE — 99305 1ST NF CARE MODERATE MDM 35: CPT | Performed by: NURSE PRACTITIONER

## 2020-06-26 ASSESSMENT — MIFFLIN-ST. JEOR: SCORE: 1534.74

## 2020-06-26 NOTE — TELEPHONE ENCOUNTER
Pt discharged from hospital. Will call pt week of June 29 to assist in scheduling a VV with Dr. Loco.    Chelsea Ortiz RN  Gastroenterology Care Coordinator  [place], [place]

## 2020-06-26 NOTE — LETTER
6/26/2020        RE: Abhijeet Mccauley  [address] [place] [id number]        [facility]  PRIMARY CARE PROVIDER AND CLINIC: Chidi Valenzuela MD, [address] [facility] [address]; Phone: [phone]; Fax: [phone]  Chief Complaint   Patient presents with     Hospital F/U     [facility] Record Number: [id number]  Place of Service where encounter took place[facility] [place] [facility] [[id number]]    Abhijeet Mccauley is a 59 year old (1960), admitted to the above facility from  [facility]. Hospital stay 06/12/20 through 06/25/20. Admitted to this facility for rehab, medical management and nursing care.    HPI:    HPI information obtained from: facility chart records, facility staff, patient report and [facility] chart review.   Brief Summary of Hospital Course: Abhijeet Mccauley has a past medical history of chronic alcohol use, hypertension, COPD, polyneuropathy, esophageal varices,  Polyneuropathy, ulcerative colitis, CKD3, avascular necrosis L hip S/P L total hip arthroplasty a few weeks ago, anemia and GIB, AML S/P chemo in 2011 with complete resolution, COPD.  S/he was admitted to the hospital with several falls, found down in his garage and found to have altered mental status, current alcohol use, and began seizing in the ED as well as R rib fractures 3-8, acute kidney injury.  He was intubated and sent to [facility] for ongoing care.  S/he underwent supportive care, alcohol withdrawal protocol requiring ativan . The hospital stay was complicated with new findings of portal hypertension, alcoholic cirrhosis of the liver S/P paracentesis, chronic elevated liver enzymes, BRBPR with ABL anemia on chronic anemia, penile wound of uncertain etiology.      Updates on Status Since Skilled nursing Admission: Abhijeet reports he knows he is chrystal to be alive, reports he wants to quit drinking and will need help to accomplish  this.  He reports minima lpain today, slept well last night. He reports itching back from detergents used in the hospital. Nursing reports needing evaluation for ongoing care needs of penile wound.       CODE STATUS/ADVANCE DIRECTIVES DISCUSSION: CPR/Full code   Patient's living condition: lives with family, sister, at her home   ALLERGIES: Blood transfusion related (informational only); Famotidine; Pepcid; Vancomycin; Cyclobenzaprine; Hydrocodone-acetaminophen; Methocarbamol; and Vfend  PAST MEDICAL HISTORY:  has a past medical history of Alcohol dependence (H), Alcoholic cirrhosis of liver (H), AML (acute myeloid leukemia) in remission (H), Chronic obstructive pulmonary disease (5/17/2018), COPD (chronic obstructive pulmonary disease) (H), History of pulmonary embolus (PE), Hypertension, PUD (peptic ulcer disease), Tobacco dependence, and Ulcerative colitis (H). He also has no past medical history of Complication of anesthesia, Diabetes (H), Heart disease, PONV (postoperative nausea and vomiting), or Sleep apnea.  PAST SURGICAL HISTORY:  has a past surgical history that includes Esophagoscopy, gastroscopy, duodenoscopy (EGD), combined (N/A, 2/8/2018); TOTAL KNEE ARTHROPLASTY (Left); Laceration repair (Right); Esophagoscopy, gastroscopy, duodenoscopy (EGD), combined (N/A, 3/18/2018); Bone marrow biopsy, bone specimen, needle/trocar (N/A, 6/12/2018); Phacoemulsification with standard intraocular lens implant (Left, 7/1/2019); Phacoemulsification with standard intraocular lens implant (Right, 7/29/2019); Esophagoscopy, gastroscopy, duodenoscopy (EGD), combined (N/A, 2/28/2020); Arthroplasty hip (Left, 5/29/2020); Cystoscopy, retrogrades, insert stent ureter(s), combined (Left, 6/13/2020); Percutaneous nephrostomy (Left, 6/13/2020); IR Paracentesis (6/22/2020); and PICC/Midline Placement (Left, 06/25/2020).  FAMILY HISTORY: family history includes Coronary Artery Disease in his father; Hypertension in his  mother.  SOCIAL HISTORY:  reports that he has been smoking cigarettes. He has a 15.00 pack-year smoking history. He has never used smokeless tobacco. He reports current alcohol use. He reports current drug use. Drug: Marijuana.    Post Discharge Medication Reconciliation Status: discharge medications reconciled and changed, per note/orders (see AVS)    Current Outpatient Medications   Medication Sig Dispense Refill     acetaminophen (TYLENOL) 325 MG tablet Take 2 tablets (650 mg) by mouth 3 times daily       albuterol (VENTOLIN HFA) 108 (90 Base) MCG/ACT inhaler Inhale 2 puffs into the lungs every 4 hours as needed for shortness of breath / dyspnea or wheezing 18 g 11     alum & mag hydroxide-simethicone (MAALOX  ES) 400-400-40 MG/5ML SUSP suspension Take 30 mLs by mouth every 6 hours as needed for indigestion or heartburn       aspirin (ASA) 81 MG chewable tablet Take 1 tablet (81 mg) by mouth daily       atorvastatin 20 MG PO tablet Take 1 tablet (20 mg) by mouth daily 90 tablet 3     baclofen (LIORESAL) 10 MG tablet Take 0.5 tablets (5 mg) by mouth 2 times daily       buPROPion (WELLBUTRIN SR) 150 MG 12 hr tablet Take 150 mg by mouth 2 times daily       ceFEPIme (MAXIPIME) 2 G vial Inject 2 g into the vein every 12 hours for 2 days 80 mL 0     diclofenac (VOLTAREN) 1 % topical gel PLACE 2 GRAMS ONTO THE SKIN FOUR TIMES A DAY AS NEEDED FOR MODERATE PAIN 100 g 11     DULERA 200-5 MCG/ACT inhaler INHALE TWO PUFFS BY MOUTH TWICE A DAY 13 g 0     folic acid (FOLVITE) 1 MG tablet Take 1 tablet (1 mg) by mouth daily       furosemide (LASIX) 40 MG tablet Take 1 tablet (40 mg) by mouth daily       gabapentin (NEURONTIN) 300 MG capsule Take 600 mg by mouth 3 times daily       lactulose (CHRONULAC) 10 GM/15ML solution Take 15 mLs (10 g) by mouth 2 times daily       Lidocaine (LIDOCARE) 4 % Patch Place 1 patch onto the skin every 24 hours To prevent lidocaine toxicity, patient should be patch free for 12 hrs daily.        melatonin 3 MG tablet Take 1 tablet (3 mg) by mouth nightly as needed for sleep       montelukast (SINGULAIR) 10 MG tablet Take 1 tablet (10 mg) by mouth At Bedtime 90 tablet 3     naltrexone (DEPADE/REVIA) 50 MG tablet Take 1 tablet (50 mg) by mouth daily       nicotine (NICODERM CQ) 14 MG/24HR 24 hr patch Place 1 patch onto the skin daily       nicotine 2 MG MT lozenge Place 1 lozenge (2 mg) inside cheek every hour as needed for smoking cessation 108 lozenge 11     ondansetron (ZOFRAN-ODT) 4 MG ODT tab Take 1 tablet (4 mg) by mouth every 12 hours as needed for nausea or vomiting       pantoprazole (PROTONIX) 40 MG EC tablet Take 1 tablet (40 mg) by mouth 2 times daily 120 tablet 0     polyethylene glycol (MIRALAX) 17 g packet Take 17 g by mouth daily as needed for constipation       propranolol (INDERAL) 20 MG tablet Take 1 tablet (20 mg) by mouth 2 times daily 180 tablet 3     senna-docusate (SENOKOT-S/PERICOLACE) 8.6-50 MG tablet Take 1-2 tablets by mouth daily as needed for constipation Take while on oral narcotics to prevent or treat constipation. 10 tablet 0     spironolactone (ALDACTONE) 50 MG tablet Take 50 mg by mouth daily       vitamin B1 (THIAMINE) 100 MG tablet Take 1 tablet (100 mg) by mouth daily       order for DME Equipment being ordered: 4 wheel walker 1 Units 0     order for DME Equipment being ordered: 2 pairs thigh high compression stockings, strength per patient preference 2 Units 1     order for DME Equipment being ordered: Compression Stockings TWO (2) Pairs, 15-20 mm Hg. 2 Package prn     order for DME Equipment being ordered: bed pull up bar 1 Units 0     order for DME Equipment being ordered: shower chair 1 Device 0     ROS:  10 point ROS of systems including Constitutional, Eyes, Respiratory, Cardiovascular, Gastroenterology, Genitourinary, Integumentary, Musculoskeletal, Psychiatric were all negative except for pertinent positives noted in my HPI.    Vitals:  /56   Pulse 67    "Temp 97.8  F (36.6  C)   Resp 18   Ht 1.676 m (5' 6\")   Wt 78.7 kg (173 lb 8 oz)   SpO2 94%   BMI 28.00 kg/m    Exam:  GENERAL APPEARANCE:  Alert, in no acute distress   HEAD:  Normal, normocephalic, atraumatic  ENT:  Mouth and posterior oropharynx normal, moist mucous membranes, hearing acuity - within normal limits   EYE EXAM:  EOM, conjunctivae, lids, pupils and irises normal   CHEST/RESP:  respiratory effort normal, no respiratory distress, lung sounds CTA    CV:  Rate and rhythm reg, no murmur/rub/gallop, no peripheral edema  ABD:  Mild distention, mild fluid shift  M/S:   extremities normal, gait abnormal-transfers with minimal assist, digits and nails within normal limits   SKIN:  See pics below, penis with small fissure near the juncture of the glans and shaft, ventrally located.  This is about 1 cm in length and about 0.2 cm deep, painful at the base  NEUROLOGIC EXAM: Normal gross motor movement, tone and coordination. No tremor. Cranial nerves 2-12 are normal tested and grossly at patient's baseline  PSYCH:  Alert and oriented to person-place-time, affect pleasant              Lab/Diagnostic data:  Recent labs in [facility] reviewed by me today.     ASSESSMENT/PLAN:  Sepsis with acute renal failure and septic shock, due to unspecified organism, unspecified acute renal failure type (H)  Urinary tract infection without hematuria, site unspecified  He has indwelling ga cath right now, completing course of IV antibiotics for complicated UTI.  Afebrile, feeling well.     Uncomplicated alcohol dependence (H)  He reports he is motivated to stop drinking, will need resources to aid him.  He is on naltrexone now, as well as folic acid, thiamine.    Alcoholic cirrhosis of liver with ascites (H)  Portal Hypertension  Newer finding of cirrhosis with ascites, S/P paracentesis.  Now on lactulose, furosemide, spironolactone, propranolol.   -goal for 2-3 soft BMs per day   -monitor daily bowel movements  -consider " increase in spironolactone if kidney function stabilizes    Acute Kidney Injury  CKD (chronic kidney disease) stage 3, GFR 30-59 ml/min (H)  Baseline creat about 1.7. trended up to creat 1.93, now back at baseline.   -Avoid nephrotoxic medications  -Renal dosing of medications  -monitor kidney function 6/29/20      Avascular necrosis of bone of hip, left (H)  Status post total hip replacement, left  Recent total hip arthroplasty due to avascular necrosis.  The incision is CDI and well healed.    -therapy to evaluate and treat    Recurrent falls  Generalized weakness  Closed fracture of multiple ribs of right side with routine healing, subsequent encounter  Weakness, pain, and falling.  Fracture ribs with pain, using lidocaine patch for pain control.      BRBPR (bright red blood per rectum)  Universal ulcerative (chronic) colitis(556.6) (H)  Secondary esophageal varices with bleeding (H)  Iron deficiency anemia due to chronic blood loss  Episode of BRBPR thought due to rectal tube, patient with ulcerative colitis history and esophageal varices S/P banding. Baseline hemoglobin per review appears to be about 7-9, most recent at 7.8.  -check hemoglobin 6/29/20       AIDP (acute inflammatory demyelinating polyneuropathy) (H)  Chronic bilateral low back pain without sciatica  Patient with chronic polyneuropathy, LBP on gabapentin, baclofen with good relief of pain today.    -trial of gradual decrease of baclofen as tolerated    Acute myeloid leukemia in remission (H)  Per review of history, S/P chemo and in remission    Open wound of penis, subsequent encounter  Chong catheter in place on admission  Suspect open wound of penis is related to pressure due to extensive illness and ICU stay, intubation, ascites.      Chronic obstructive pulmonary disease, unspecified COPD type (H)  On singulair, dulera and prn albuterol inhaler.  Stable respiratory status now.     Tobacco dependence syndrome  Chronic smoker of about 1/2 ppd,  on nicoderm now.    -encourage smoking cessation        transcribed by : Miranda Nguyễn  1. Diagnoses clarified on OSR  2. Maximum tylenol per 24 hours not to exceed 2000 mg   3. Lab draw 6/29/20    CMP - dx: cirrhosis liver    CBC - dx: anemia  4. Eucerin cream (or covered alternative) at bedtime to back - dx: xerosis  5. Clarify dressing change to penis wound daily and prn    Cleanse     Triad paste    Xeroform gauze    Kerlix over gauze with small amount of tape to hold in place  6. Record BM's Q shift: number and consistency - dx: liver cirrhosis    Total time spent with patient visit at the skilled nursing facility was 45 min including patient visit and review of past records. Greater than 50% of total time spent with counseling and coordinating care due to multiple medical comorbid conditions, discussion re alcohol cessation resources, and goals of care.     Electronically signed by:  LOPEZ Campbell CNP         Sincerely,        LOPEZ Campbell CNP

## 2020-06-26 NOTE — PROGRESS NOTES
Clinic Care Coordination Contact  Care Coordination Transition Communication    Referral Source: IP Handoff    Clinical Data: Patient was hospitalized at [facility] from 6/12 to 6/25/20 with diagnosis of alcohol use disorder, requiring intubation during initial alcohol withdrawal. Patient is s/p recent hip replacement surgery and had a fall at home resulting in right 3-8 rib fractures.       Transition to Facility:              Facility Name: [facility]   Contact information: Main: [phone]; Admissions: [phone]   Fax: [phone]      Plan:  Care Coordinator will review chart to monitor for discharge planning every 3-4 weeks and as needed and outreach to patient once appropriate.     LEONARD TrivediN, RN   [facility]  - [facility] Care Coordinator

## 2020-06-28 ASSESSMENT — MIFFLIN-ST. JEOR: SCORE: 1493.01

## 2020-06-29 ENCOUNTER — NURSING HOME VISIT (OUTPATIENT)
Dept: GERIATRICS | Facility: CLINIC | Age: 60
End: 2020-06-29
Payer: COMMERCIAL

## 2020-06-29 ENCOUNTER — HOSPITAL LABORATORY (OUTPATIENT)
Facility: OTHER | Age: 60
End: 2020-06-29

## 2020-06-29 ENCOUNTER — TELEPHONE (OUTPATIENT)
Dept: GASTROENTEROLOGY | Facility: CLINIC | Age: 60
End: 2020-06-29

## 2020-06-29 VITALS
OXYGEN SATURATION: 97 % | WEIGHT: 164.3 LBS | BODY MASS INDEX: 26.41 KG/M2 | RESPIRATION RATE: 22 BRPM | SYSTOLIC BLOOD PRESSURE: 124 MMHG | HEIGHT: 66 IN | HEART RATE: 66 BPM | TEMPERATURE: 97.5 F | DIASTOLIC BLOOD PRESSURE: 66 MMHG

## 2020-06-29 DIAGNOSIS — D50.0 IRON DEFICIENCY ANEMIA DUE TO CHRONIC BLOOD LOSS: ICD-10-CM

## 2020-06-29 DIAGNOSIS — K70.31 ALCOHOLIC CIRRHOSIS OF LIVER WITH ASCITES (H): Primary | ICD-10-CM

## 2020-06-29 DIAGNOSIS — R29.6 RECURRENT FALLS: ICD-10-CM

## 2020-06-29 DIAGNOSIS — G47.00 INSOMNIA, UNSPECIFIED TYPE: ICD-10-CM

## 2020-06-29 DIAGNOSIS — N18.30 CKD (CHRONIC KIDNEY DISEASE) STAGE 3, GFR 30-59 ML/MIN (H): ICD-10-CM

## 2020-06-29 DIAGNOSIS — S22.41XD CLOSED FRACTURE OF MULTIPLE RIBS OF RIGHT SIDE WITH ROUTINE HEALING, SUBSEQUENT ENCOUNTER: ICD-10-CM

## 2020-06-29 LAB
ALBUMIN SERPL-MCNC: 2.4 G/DL (ref 3.4–5)
ALP SERPL-CCNC: 756 U/L (ref 40–150)
ALT SERPL W P-5'-P-CCNC: 77 U/L (ref 0–70)
ANION GAP SERPL CALCULATED.3IONS-SCNC: 8 MMOL/L (ref 3–14)
AST SERPL W P-5'-P-CCNC: 69 U/L (ref 0–45)
BACTERIA SPEC CULT: ABNORMAL
BACTERIA SPEC CULT: ABNORMAL
BILIRUB SERPL-MCNC: 1.1 MG/DL (ref 0.2–1.3)
BUN SERPL-MCNC: 35 MG/DL (ref 7–30)
CALCIUM SERPL-MCNC: 9.1 MG/DL (ref 8.5–10.1)
CHLORIDE SERPL-SCNC: 109 MMOL/L (ref 94–109)
CO2 SERPL-SCNC: 18 MMOL/L (ref 20–32)
CREAT SERPL-MCNC: 1.64 MG/DL (ref 0.66–1.25)
ERYTHROCYTE [DISTWIDTH] IN BLOOD BY AUTOMATED COUNT: 21.8 % (ref 10–15)
GFR SERPL CREATININE-BSD FRML MDRD: 44 ML/MIN/{1.73_M2}
GLUCOSE SERPL-MCNC: 75 MG/DL (ref 70–99)
HCT VFR BLD AUTO: 27.8 % (ref 40–53)
HGB BLD-MCNC: 8.4 G/DL (ref 13.3–17.7)
MCH RBC QN AUTO: 31 PG (ref 26.5–33)
MCHC RBC AUTO-ENTMCNC: 30.2 G/DL (ref 31.5–36.5)
MCV RBC AUTO: 103 FL (ref 78–100)
PLATELET # BLD AUTO: 383 10E9/L (ref 150–450)
POTASSIUM SERPL-SCNC: 4.1 MMOL/L (ref 3.4–5.3)
PROT SERPL-MCNC: 6.9 G/DL (ref 6.8–8.8)
RBC # BLD AUTO: 2.71 10E12/L (ref 4.4–5.9)
SODIUM SERPL-SCNC: 135 MMOL/L (ref 133–144)
SPECIMEN SOURCE: ABNORMAL
WBC # BLD AUTO: 13.8 10E9/L (ref 4–11)

## 2020-06-29 PROCEDURE — 99309 SBSQ NF CARE MODERATE MDM 30: CPT | Performed by: NURSE PRACTITIONER

## 2020-06-29 RX ORDER — TRAZODONE HYDROCHLORIDE 50 MG/1
50 TABLET, FILM COATED ORAL AT BEDTIME
COMMUNITY
Start: 2020-06-29 | End: 2020-07-15

## 2020-06-29 RX ORDER — LACTULOSE 10 G/15ML
30 SOLUTION ORAL 2 TIMES DAILY
COMMUNITY
Start: 2020-06-29 | End: 2020-07-06

## 2020-06-29 RX ORDER — FUROSEMIDE 20 MG
20 TABLET ORAL DAILY
Status: ON HOLD | COMMUNITY
Start: 2020-06-29 | End: 2020-08-29

## 2020-06-29 RX ORDER — BACLOFEN 10 MG/1
5 TABLET ORAL AT BEDTIME
COMMUNITY
Start: 2020-06-29 | End: 2020-07-06

## 2020-06-29 NOTE — TELEPHONE ENCOUNTER
Pt will be discharged from nursing home in 10 days. He wishes to not schedule an appointment until he is discharged.      Chelsea Ortiz RN  Gastroenterology Care Coordinator  [place], [place]

## 2020-06-29 NOTE — PROGRESS NOTES
[facility]  [place] [facility] Record Number: [id number]  Place of Service where encounter took place: [facility] [[id number]]  Chief Complaint   Patient presents with     RECHECK       HPI:    Abhijeet Mccauley is a 59 year old (1960), who is being seen today for an episodic care visit. HPI information obtained from: facility chart records, facility staff, patient report and [facility] chart review. Today's concern is:     Alcoholic cirrhosis of liver with ascites (H)  CKD (chronic kidney disease) stage 3, GFR 30-59 ml/min (H)  Closed fracture of multiple ribs of right side with routine healing, subsequent encounter  Recurrent falls  Insomnia, unspecified type  Iron deficiency anemia due to chronic blood loss     Abhijeet reports very poor sleep, no regular BMs.  Nursing reports increased confusion, irritation.       Past Medical and Surgical History reviewed in Epic today.    MEDICATIONS:    Current Outpatient Medications   Medication Sig Dispense Refill     acetaminophen (TYLENOL) 325 MG tablet Take 2 tablets (650 mg) by mouth 3 times daily       albuterol (VENTOLIN HFA) 108 (90 Base) MCG/ACT inhaler Inhale 2 puffs into the lungs every 4 hours as needed for shortness of breath / dyspnea or wheezing 18 g 11     alum & mag hydroxide-simethicone (MAALOX  ES) 400-400-40 MG/5ML SUSP suspension Take 30 mLs by mouth every 6 hours as needed for indigestion or heartburn       aspirin (ASA) 81 MG chewable tablet Take 1 tablet (81 mg) by mouth daily       atorvastatin 20 MG PO tablet Take 1 tablet (20 mg) by mouth daily 90 tablet 3     baclofen (LIORESAL) 10 MG tablet Take 5 mg by mouth At Bedtime       diclofenac (VOLTAREN) 1 % topical gel PLACE 2 GRAMS ONTO THE SKIN FOUR TIMES A DAY AS NEEDED FOR MODERATE PAIN 100 g 11     DULERA 200-5 MCG/ACT inhaler INHALE TWO PUFFS BY MOUTH TWICE A DAY 13 g 0     folic acid (FOLVITE) 1 MG tablet Take 1 tablet (1 mg) by mouth daily        furosemide (LASIX) 20 MG tablet Take 20 mg by mouth daily       lactulose (CHRONULAC) 10 GM/15ML solution Take 30 mLs by mouth 2 times daily       Lidocaine (LIDOCARE) 4 % Patch Place 1 patch onto the skin every 24 hours To prevent lidocaine toxicity, patient should be patch free for 12 hrs daily.       melatonin 3 MG tablet Take 1 tablet (3 mg) by mouth nightly as needed for sleep       montelukast (SINGULAIR) 10 MG tablet Take 1 tablet (10 mg) by mouth At Bedtime 90 tablet 3     naltrexone (DEPADE/REVIA) 50 MG tablet Take 1 tablet (50 mg) by mouth daily       nicotine (NICODERM CQ) 14 MG/24HR 24 hr patch Place 1 patch onto the skin daily       nicotine 2 MG MT lozenge Place 1 lozenge (2 mg) inside cheek every hour as needed for smoking cessation 108 lozenge 11     ondansetron (ZOFRAN-ODT) 4 MG ODT tab Take 1 tablet (4 mg) by mouth every 12 hours as needed for nausea or vomiting       pantoprazole (PROTONIX) 40 MG EC tablet Take 1 tablet (40 mg) by mouth 2 times daily 120 tablet 0     polyethylene glycol (MIRALAX) 17 g packet Take 17 g by mouth daily as needed for constipation       propranolol (INDERAL) 20 MG tablet Take 1 tablet (20 mg) by mouth 2 times daily 180 tablet 3     senna-docusate (SENOKOT-S/PERICOLACE) 8.6-50 MG tablet Take 1-2 tablets by mouth daily as needed for constipation Take while on oral narcotics to prevent or treat constipation. 10 tablet 0     traZODone (DESYREL) 50 MG tablet Take 50 mg by mouth At Bedtime       vitamin B1 (THIAMINE) 100 MG tablet Take 1 tablet (100 mg) by mouth daily       buPROPion (WELLBUTRIN SR) 150 MG 12 hr tablet Take 150 mg by mouth 2 times daily       gabapentin (NEURONTIN) 300 MG capsule Take 600 mg by mouth 2 times daily        order for DME Equipment being ordered: 4 wheel walker 1 Units 0     order for DME Equipment being ordered: 2 pairs thigh high compression stockings, strength per patient preference 2 Units 1     order for DME Equipment being ordered:  "Compression Stockings TWO (2) Pairs, 15-20 mm Hg. 2 Package prn     order for DME Equipment being ordered: bed pull up bar 1 Units 0     order for DME Equipment being ordered: shower chair 1 Device 0     spironolactone (ALDACTONE) 50 MG tablet Take 50 mg by mouth daily       REVIEW OF SYSTEMS:  4 point ROS including Respiratory, CV, GI and , other than that noted in the HPI,  is negative    Objective:  /66   Pulse 66   Temp 97.5  F (36.4  C)   Resp 22   Ht 1.676 m (5' 6\")   Wt 74.5 kg (164 lb 4.8 oz)   SpO2 97%   BMI 26.52 kg/m    Exam:  GENERAL APPEARANCE:  Alert, in no acute distress   HEAD:  Normal, normocephalic, atraumatic  ENT:  Mouth and posterior oropharynx normal, moist mucous membranes, hearing acuity - within normal limits   EYE EXAM:  EOM, conjunctivae, lids, pupils and irises normal   CHEST/RESP:  respiratory effort normal, no respiratory distress, lung sounds CTA    CV:  Rate and rhythm reg, no murmur/rub/gallop, no peripheral edema  M/S:   extremities normal, gait normal-short distances with 2w rolling walker , digits and nails within normal limits   NEUROLOGIC EXAM: Normal gross motor movement, tone and coordination. No tremor. Cranial nerves 2-12 are normal tested and grossly at patient's baseline  PSYCH:  Alert and oriented to self with confusion, affect pleasant      Labs:     Most Recent 3 CBC's:  Recent Labs   Lab Test 06/29/20  0715 06/23/20  0630 06/22/20  0552   WBC 13.8* 16.8* 17.2*   HGB 8.4* 7.8* 7.7*   * 106* 105*    183 164     Most Recent 3 BMP's:  Recent Labs   Lab Test 06/29/20  0715 06/25/20  0607 06/23/20  0630    138 141   POTASSIUM 4.1 4.5 4.5   CHLORIDE 109 109 110*   CO2 18* 17* 25   BUN 35* 43* 57*   CR 1.64* 1.49* 1.46*   ANIONGAP 8 12 6   BEE 9.1 8.7 8.9   GLC 75 76 128*     Most Recent 2 LFT's:  Recent Labs   Lab Test 06/29/20  0715 06/23/20  0630   AST 69* 78*   ALT 77* 57   ALKPHOS 756* 738*   BILITOTAL 1.1 0.9 "       ASSESSMENT/PLAN:  Alcoholic cirrhosis of liver with ascites (H)  Patient with known alcoholic cirrhosis, no new ascites and weights stable. No new dyspnea, no new cough.  Goal, per recommendations at hospital discharge were to increase diuretics if kidney function was ok.  However, slightly worsening kidney function. He also appears a bit more confused today, not at goal of 2-3 soft BMs per day  -decrease lasix  -increase lactulose    CKD (chronic kidney disease) stage 3, GFR 30-59 ml/min (H)  Baseline creat about 1.7.  Most recent up to 1.64 from low of 1.49 on admission.   -decrease lasix  -decrease baclofen to hs due to renal function   -Avoid nephrotoxic medications  -Renal dosing of medications  -monitor kidney function 7/6/20      Closed fracture of multiple ribs of right side with routine healing, subsequent encounter  Recurrent falls  Patient with ongoing pain from falling and fracture ribs which is limiting sleep and mobility.  On tylenol 650 tid for pain control, as well as gabapentin tid.    -decrease gabapentin tid to bid due to renal function     Insomnia, unspecified type  Patient with reports of ongoing insomnia, difficulty sleeping and previously used trazodone nightly.  This was discontinued during hospital stay  -resume trazodone  -schedule melatonin    Iron deficiency anemia due to chronic blood loss  Baseline hemoglobin 7-9, most recent slightly improved to 8.4.  No obvious melena, no hematemesis.  On PPI BID.  -follow hemoglobin, recheck 7/6/20         transcribed by : Miranda Nguyễn  1. Discontinue gabapentin 600 TID  2. Gabapentin 600 mg BID - dx: neuropathy  3. Discontinue lactulose 15 ml BID  4. Lactulose 30 ml BID po - dx: cirrhosis liver  5. Discontinue lasix 40  6. Lasix 20 mg every day po - dx: cirrhosis  7. Discontinue baclofen 5 BID  8. Baclofen 5 mg at bedtime po - dx: muscle spasms  9. Trazodone 50 mg po at bedtime - dx: insomnia  10. Labs: 7/6/20    BMP - dx:  CKD    CBC - dx: anemia    Electronically signed by:  LOPEZ Campbell CNP

## 2020-06-29 NOTE — LETTER
6/29/2020        RE: Abhijeet Mccauley  [address] [place] [id number]        [facility]  [facility] [facility] Record Number: [id number]  Place of Service where encounter took place[facility] [[id number]]  Chief Complaint   Patient presents with     RECHECK       HPI:    Abhijeet Mccauley is a 59 year old (1960), who is being seen today for an episodic care visit. HPI information obtained from: facility chart records, facility staff, patient report and [facility] chart review. Today's concern is:     Alcoholic cirrhosis of liver with ascites (H)  CKD (chronic kidney disease) stage 3, GFR 30-59 ml/min (H)  Closed fracture of multiple ribs of right side with routine healing, subsequent encounter  Recurrent falls  Insomnia, unspecified type  Iron deficiency anemia due to chronic blood loss     Abhijeet reports very poor sleep, no regular BMs.  Nursing reports increased confusion, irritation.       Past Medical and Surgical History reviewed in Epic today.    MEDICATIONS:    Current Outpatient Medications   Medication Sig Dispense Refill     acetaminophen (TYLENOL) 325 MG tablet Take 2 tablets (650 mg) by mouth 3 times daily       albuterol (VENTOLIN HFA) 108 (90 Base) MCG/ACT inhaler Inhale 2 puffs into the lungs every 4 hours as needed for shortness of breath / dyspnea or wheezing 18 g 11     alum & mag hydroxide-simethicone (MAALOX  ES) 400-400-40 MG/5ML SUSP suspension Take 30 mLs by mouth every 6 hours as needed for indigestion or heartburn       aspirin (ASA) 81 MG chewable tablet Take 1 tablet (81 mg) by mouth daily       atorvastatin 20 MG PO tablet Take 1 tablet (20 mg) by mouth daily 90 tablet 3     baclofen (LIORESAL) 10 MG tablet Take 5 mg by mouth At Bedtime       diclofenac (VOLTAREN) 1 % topical gel PLACE 2 GRAMS ONTO THE SKIN FOUR TIMES A DAY AS NEEDED FOR MODERATE PAIN 100 g 11     DULERA 200-5 MCG/ACT inhaler INHALE TWO PUFFS BY MOUTH TWICE A DAY 13 g 0      folic acid (FOLVITE) 1 MG tablet Take 1 tablet (1 mg) by mouth daily       furosemide (LASIX) 20 MG tablet Take 20 mg by mouth daily       lactulose (CHRONULAC) 10 GM/15ML solution Take 30 mLs by mouth 2 times daily       Lidocaine (LIDOCARE) 4 % Patch Place 1 patch onto the skin every 24 hours To prevent lidocaine toxicity, patient should be patch free for 12 hrs daily.       melatonin 3 MG tablet Take 1 tablet (3 mg) by mouth nightly as needed for sleep       montelukast (SINGULAIR) 10 MG tablet Take 1 tablet (10 mg) by mouth At Bedtime 90 tablet 3     naltrexone (DEPADE/REVIA) 50 MG tablet Take 1 tablet (50 mg) by mouth daily       nicotine (NICODERM CQ) 14 MG/24HR 24 hr patch Place 1 patch onto the skin daily       nicotine 2 MG MT lozenge Place 1 lozenge (2 mg) inside cheek every hour as needed for smoking cessation 108 lozenge 11     ondansetron (ZOFRAN-ODT) 4 MG ODT tab Take 1 tablet (4 mg) by mouth every 12 hours as needed for nausea or vomiting       pantoprazole (PROTONIX) 40 MG EC tablet Take 1 tablet (40 mg) by mouth 2 times daily 120 tablet 0     polyethylene glycol (MIRALAX) 17 g packet Take 17 g by mouth daily as needed for constipation       propranolol (INDERAL) 20 MG tablet Take 1 tablet (20 mg) by mouth 2 times daily 180 tablet 3     senna-docusate (SENOKOT-S/PERICOLACE) 8.6-50 MG tablet Take 1-2 tablets by mouth daily as needed for constipation Take while on oral narcotics to prevent or treat constipation. 10 tablet 0     traZODone (DESYREL) 50 MG tablet Take 50 mg by mouth At Bedtime       vitamin B1 (THIAMINE) 100 MG tablet Take 1 tablet (100 mg) by mouth daily       buPROPion (WELLBUTRIN SR) 150 MG 12 hr tablet Take 150 mg by mouth 2 times daily       gabapentin (NEURONTIN) 300 MG capsule Take 600 mg by mouth 2 times daily        order for DME Equipment being ordered: 4 wheel walker 1 Units 0     order for DME Equipment being ordered: 2 pairs thigh high compression stockings, strength per  "patient preference 2 Units 1     order for DME Equipment being ordered: Compression Stockings TWO (2) Pairs, 15-20 mm Hg. 2 Package prn     order for DME Equipment being ordered: bed pull up bar 1 Units 0     order for DME Equipment being ordered: shower chair 1 Device 0     spironolactone (ALDACTONE) 50 MG tablet Take 50 mg by mouth daily       REVIEW OF SYSTEMS:  4 point ROS including Respiratory, CV, GI and , other than that noted in the HPI,  is negative    Objective:  /66   Pulse 66   Temp 97.5  F (36.4  C)   Resp 22   Ht 1.676 m (5' 6\")   Wt 74.5 kg (164 lb 4.8 oz)   SpO2 97%   BMI 26.52 kg/m    Exam:  GENERAL APPEARANCE:  Alert, in no acute distress   HEAD:  Normal, normocephalic, atraumatic  ENT:  Mouth and posterior oropharynx normal, moist mucous membranes, hearing acuity - within normal limits   EYE EXAM:  EOM, conjunctivae, lids, pupils and irises normal   CHEST/RESP:  respiratory effort normal, no respiratory distress, lung sounds CTA    CV:  Rate and rhythm reg, no murmur/rub/gallop, no peripheral edema  M/S:   extremities normal, gait normal-short distances with 2w rolling walker , digits and nails within normal limits   NEUROLOGIC EXAM: Normal gross motor movement, tone and coordination. No tremor. Cranial nerves 2-12 are normal tested and grossly at patient's baseline  PSYCH:  Alert and oriented to self with confusion, affect pleasant      Labs:     Most Recent 3 CBC's:  Recent Labs   Lab Test 06/29/20  0715 06/23/20  0630 06/22/20  0552   WBC 13.8* 16.8* 17.2*   HGB 8.4* 7.8* 7.7*   * 106* 105*    183 164     Most Recent 3 BMP's:  Recent Labs   Lab Test 06/29/20  0715 06/25/20  0607 06/23/20  0630    138 141   POTASSIUM 4.1 4.5 4.5   CHLORIDE 109 109 110*   CO2 18* 17* 25   BUN 35* 43* 57*   CR 1.64* 1.49* 1.46*   ANIONGAP 8 12 6   BEE 9.1 8.7 8.9   GLC 75 76 128*     Most Recent 2 LFT's:  Recent Labs   Lab Test 06/29/20  0715 06/23/20  0630   AST 69* 78*   ALT " 77* 57   ALKPHOS 756* 738*   BILITOTAL 1.1 0.9       ASSESSMENT/PLAN:  Alcoholic cirrhosis of liver with ascites (H)  Patient with known alcoholic cirrhosis, no new ascites and weights stable. No new dyspnea, no new cough.  Goal, per recommendations at hospital discharge were to increase diuretics if kidney function was ok.  However, slightly worsening kidney function. He also appears a bit more confused today, not at goal of 2-3 soft BMs per day  -decrease lasix  -increase lactulose    CKD (chronic kidney disease) stage 3, GFR 30-59 ml/min (H)  Baseline creat about 1.7.  Most recent up to 1.64 from low of 1.49 on admission.   -decrease lasix  -decrease baclofen to hs due to renal function   -Avoid nephrotoxic medications  -Renal dosing of medications  -monitor kidney function 7/6/20      Closed fracture of multiple ribs of right side with routine healing, subsequent encounter  Recurrent falls  Patient with ongoing pain from falling and fracture ribs which is limiting sleep and mobility.  On tylenol 650 tid for pain control, as well as gabapentin tid.    -decrease gabapentin tid to bid due to renal function     Insomnia, unspecified type  Patient with reports of ongoing insomnia, difficulty sleeping and previously used trazodone nightly.  This was discontinued during hospital stay  -resume trazodone  -schedule melatonin    Iron deficiency anemia due to chronic blood loss  Baseline hemoglobin 7-9, most recent slightly improved to 8.4.  No obvious melena, no hematemesis.  On PPI BID.  -follow hemoglobin, recheck 7/6/20         transcribed by : Miranda Nguyễn  1. Discontinue gabapentin 600 TID  2. Gabapentin 600 mg BID - dx: neuropathy  3. Discontinue lactulose 15 ml BID  4. Lactulose 30 ml BID po - dx: cirrhosis liver  5. Discontinue lasix 40  6. Lasix 20 mg every day po - dx: cirrhosis  7. Discontinue baclofen 5 BID  8. Baclofen 5 mg at bedtime po - dx: muscle spasms  9. Trazodone 50 mg po at bedtime  - dx: insomnia  10. Labs: 7/6/20    BMP - dx: CKD    CBC - dx: anemia    Electronically signed by:  LOPEZ Campbell CNP         Sincerely,        LOPEZ Campbell CNP

## 2020-07-03 NOTE — PROGRESS NOTES
" [facility]  Abhijeet Mccauley is being evaluated via a billable video visit due to the restrictions of the Covid-19 pandemic.   The patient has been notified of following:  \"This video visit will be conducted via a call between you and your provider. We have found that certain health care needs can be provided without the need for an in-person physical exam.  This service lets us provide the care you need with a video conversation. If during the course of the call the provider feels a video visit is not appropriate, you will not be charged for this service.\"   The provider has received verbal consent for a Video Visit from the patient and or first contact? Yes  Patient/facility staff would like the video invitation sent by: N/A   Video Start Time: 10:53  Which Facility the Patient is at during the time of visit: [facility]   Received verbal consent to use Care Everywhere in order to access labs, documents, histories, and all other needed information to provide care at current facility.  PRIMARY CARE PROVIDER AND CLINIC: Chidi Valenzuela MD, [address] [facility] [address]; Phone: [phone]; Fax: [phone]  Chief Complaint   Patient presents with     Hospital F/U     [facility] Medical Record Number: [id number]  Abhijeet Mccauley is a 59 year old (1960), admitted to the above facility from  [facility]. Hospital stay 06/12/20 through 06/25/20. Admitted to this facility for rehab, medical management and nursing care.  HPI:    HPI information obtained from: facility staff, patient report and [facility] chart review.   Brief Summary of Hospital Course:   - patient with PMH notable for alcoholism, with recurrent falls, hospitalzied for metabolic encephalopathy, alcohol withdrawal with sz, pulmonary edema and acute respiratory failure, sedated and intubated. Was found to have sepsis and UTI,  Bacteremia and left upper pole obstructing stone, treated with " abx.   -  Work up revealed portal HTN, alcoholic cirrhosis, and ascites s/p paracentesis, and brief BRBPR     -   Updates on Status Since Skilled nursing Admission:   - Pt seen in the presence of RN who graciously assisted with the virtual visit  - Pt acing pain over his hip (recent replacement) 2/2 rehab, better with meds.   - reports hard for him to talk now, but denies SOB, cough,  Wheezing, or sore throat. Endorses that he does not rinse his mouth after using the inhaler, and it getting worse.   - denies fever, chills. Endorses drink enough water.   - reports the lesion over his penis is getting smaller and less painful.       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 He also has no past medical history of Complication of anesthesia, Diabetes (H), Heart disease, PONV (postoperative nausea and vomiting), or Sleep apnea.  PAST SURGICAL HISTORY:   has a past surgical history that includes Esophagoscopy, gastroscopy, duodenoscopy (EGD), combined (N/A, 2/8/2018); TOTAL KNEE ARTHROPLASTY (Left); Laceration repair (Right); Esophagoscopy, gastroscopy, duodenoscopy (EGD), combined (N/A, 3/18/2018); Bone marrow biopsy, bone specimen, needle/trocar (N/A, 6/12/2018); Phacoemulsification with standard intraocular lens implant (Left, 7/1/2019);  Phacoemulsification with standard intraocular lens implant (Right, 7/29/2019); Esophagoscopy, gastroscopy, duodenoscopy (EGD), combined (N/A, 2/28/2020); Arthroplasty hip (Left, 5/29/2020); Cystoscopy, retrogrades, insert stent ureter(s), combined (Left, 6/13/2020); Percutaneous nephrostomy (Left, 6/13/2020); IR Paracentesis (6/22/2020); PICC/Midline Placement (Left, 06/25/2020); and IR Nephrostomy Tube Placement Left (6/13/2020).  FAMILY HISTORY: family history includes Coronary Artery Disease in his father; Hypertension in his mother.  SOCIAL HISTORY:   reports that he has been smoking cigarettes. He has a 15.00 pack-year smoking history. He has never used smokeless tobacco. He reports current alcohol use. He reports current drug use. Drug: Marijuana.  Current Outpatient Medications   Medication Sig Dispense Refill     acetaminophen (TYLENOL) 325 MG tablet Take 2 tablets (650 mg) by mouth 3 times daily       albuterol (VENTOLIN HFA) 108 (90 Base) MCG/ACT inhaler Inhale 2 puffs into the lungs every 4 hours as needed for shortness of breath / dyspnea or wheezing 18 g 11     alum & mag hydroxide-simethicone (MAALOX  ES) 400-400-40 MG/5ML SUSP suspension Take 30 mLs by mouth every 6 hours as needed for indigestion or heartburn       atorvastatin 20 MG PO tablet Take 1 tablet (20 mg) by mouth daily 90 tablet 3     buPROPion (WELLBUTRIN SR) 150 MG 12 hr tablet Take 150 mg by mouth 2 times daily       diclofenac (VOLTAREN) 1 % topical gel PLACE 2 GRAMS ONTO THE SKIN FOUR TIMES A DAY AS NEEDED FOR MODERATE PAIN 100 g 11     DULERA 200-5 MCG/ACT inhaler INHALE TWO PUFFS BY MOUTH TWICE A DAY 13 g 0     folic acid (FOLVITE) 1 MG tablet Take 1 tablet (1 mg) by mouth daily       furosemide (LASIX) 20 MG tablet Take 20 mg by mouth daily       gabapentin (NEURONTIN) 300 MG capsule Take 300 mg by mouth 2 times daily       Lidocaine (LIDOCARE) 4 % Patch Place 1 patch onto the skin every 24 hours To prevent lidocaine toxicity,  patient should be patch free for 12 hrs daily.       melatonin 3 MG tablet Take 1 tablet (3 mg) by mouth nightly as needed for sleep       montelukast (SINGULAIR) 10 MG tablet Take 1 tablet (10 mg) by mouth At Bedtime 90 tablet 3     nicotine (NICODERM CQ) 14 MG/24HR 24 hr patch Place 1 patch onto the skin daily       nicotine 2 MG MT lozenge Place 1 lozenge (2 mg) inside cheek every hour as needed for smoking cessation 108 lozenge 11     ondansetron (ZOFRAN-ODT) 4 MG ODT tab Take 1 tablet (4 mg) by mouth every 12 hours as needed for nausea or vomiting       order for DME Equipment being ordered: 4 wheel walker 1 Units 0     order for DME Equipment being ordered: 2 pairs thigh high compression stockings, strength per patient preference 2 Units 1     order for DME Equipment being ordered: Compression Stockings TWO (2) Pairs, 15-20 mm Hg. 2 Package prn     order for DME Equipment being ordered: bed pull up bar 1 Units 0     order for DME Equipment being ordered: shower chair 1 Device 0     pantoprazole (PROTONIX) 40 MG EC tablet Take 1 tablet (40 mg) by mouth 2 times daily 120 tablet 0     polyethylene glycol (MIRALAX) 17 g packet Take 17 g by mouth daily       propranolol (INDERAL) 20 MG tablet Take 1 tablet (20 mg) by mouth 3 times daily 180 tablet 3     senna-docusate (SENOKOT-S/PERICOLACE) 8.6-50 MG tablet Take 1-2 tablets by mouth daily as needed for constipation Take while on oral narcotics to prevent or treat constipation. 10 tablet 0     spironolactone (ALDACTONE) 50 MG tablet Take 25 mg by mouth daily       traZODone (DESYREL) 50 MG tablet Take 50 mg by mouth At Bedtime , May repeat x1 prn for ongoing insomnia       vitamin B1 (THIAMINE) 100 MG tablet Take 1 tablet (100 mg) by mouth daily         ROS: 10 point ROS of systems including Constitutional, Eyes, Respiratory, Cardiovascular, Gastroenterology, Genitourinary, Integumentary, Musculoskeletal, Psychiatric were all negative except for pertinent positives  "noted in my HPI.  Vitals:/55   Pulse 62   Temp 97.9  F (36.6  C)   Resp 18   Ht 1.676 m (5' 6\")   Wt 74 kg (163 lb 3.2 oz)   SpO2 99%   BMI 26.34 kg/m     Limited Visit Exam done given COVID-19 precautions:  GENERAL APPEARANCE:  in no distress  RESP:  unlabored breathing  M/S:   no joint deformity noted  SKIN:  no rash noted  NEURO:   no purposeful movement in upper and lower extremities  PSYCH:  affect and mood normal    Lab/Diagnostic data: Reviewed in the chart and EHR.      ASSESSMENT/PLAN:  ---------------------------------  Sepsis with bacteremia and  UTI  GENEVIEVE in hx of CKD (H)  Left upper pole obstructing stone s/p left ureteral stent placement (6/13)  - clinically stable. Completed abx.   - - Avoid nephrotoxic drugs. Renal dose the medications.   - Urology follow up      Complicated Alcohol dependence  Recent withdrawal and sz  Hepatic cirrhosis with ascites s/p paracentesis (H)  Portal Hypertension  - stable.   - avoid hepatotoxic meds.       Hx of S/p hip replacement 2/2 avascular necrosis of left hip bone  Recurrent Falls  Right 3-8 Ribs Fractures  Physical deconditioning  - started rehab program, making a progress, continue until desired goal is achieved.   - analgesia optimal.       Acute on chronic macrocytic anemia: trend HH. Stable.   Genital wound: improving.   Hoarseness: likely 2/2 inhaler use. Could not see white patch over oral cavity on the phone. Will have GNP examine the patient in am.     Order:  rinse mouth after each inhaler.     Electronically signed by:  Kellen Miguel MD    Video-Visit Details  Type of service:  Video Visit  Video End Time (time video stopped): 11:02  Distant Location (provider location):  [facility]   "

## 2020-07-03 NOTE — PROGRESS NOTES
[facility]  [facility] Record Number: [id number]  Place of Service where encounter took place[facility] [[id number]]  Chief Complaint   Patient presents with     RECHECK       HPI:    Abhijeet Mccauley is a 59 year old (1960), who is being seen today for an episodic care visit. HPI information obtained from: facility chart records, facility staff, patient report and [facility] chart review. Today's concern is:     Open wound of penis, subsequent encounter  Ga catheter in place on admission  Alcoholic cirrhosis of liver with ascites (H)  CKD (chronic kidney disease) stage 3, GFR 30-59 ml/min (H)  Closed fracture of multiple ribs of right side with routine healing, subsequent encounter  Recurrent falls  Secondary esophageal varices with bleeding (H)  Iron deficiency anemia due to chronic blood loss  Other constipation     Abhijeet reports he continues to be very fatigued, unable to sleep and this is limiting his ability to improve in strength and function. He reports he will sometimes take an additional trazodone at home and this is helpful to him.  Nursing reports dark urine in ga bag over the weekend, and needs help with dressing to penile wound as it is difficult to keep the dressing in place.       Past Medical and Surgical History reviewed in Epic today.    MEDICATIONS:    Current Outpatient Medications   Medication Sig Dispense Refill     acetaminophen (TYLENOL) 325 MG tablet Take 2 tablets (650 mg) by mouth 3 times daily       albuterol (VENTOLIN HFA) 108 (90 Base) MCG/ACT inhaler Inhale 2 puffs into the lungs every 4 hours as needed for shortness of breath / dyspnea or wheezing 18 g 11     alum & mag hydroxide-simethicone (MAALOX  ES) 400-400-40 MG/5ML SUSP suspension Take 30 mLs by mouth every 6 hours as needed for indigestion or heartburn       atorvastatin 20 MG PO tablet Take 1 tablet (20 mg) by mouth daily 90 tablet 3     buPROPion (WELLBUTRIN SR)  150 MG 12 hr tablet Take 150 mg by mouth 2 times daily       diclofenac (VOLTAREN) 1 % topical gel PLACE 2 GRAMS ONTO THE SKIN FOUR TIMES A DAY AS NEEDED FOR MODERATE PAIN 100 g 11     DULERA 200-5 MCG/ACT inhaler INHALE TWO PUFFS BY MOUTH TWICE A DAY 13 g 0     folic acid (FOLVITE) 1 MG tablet Take 1 tablet (1 mg) by mouth daily       furosemide (LASIX) 20 MG tablet Take 20 mg by mouth daily       gabapentin (NEURONTIN) 300 MG capsule Take 600 mg by mouth 2 times daily        Lidocaine (LIDOCARE) 4 % Patch Place 1 patch onto the skin every 24 hours To prevent lidocaine toxicity, patient should be patch free for 12 hrs daily.       melatonin 3 MG tablet Take 1 tablet (3 mg) by mouth nightly as needed for sleep       montelukast (SINGULAIR) 10 MG tablet Take 1 tablet (10 mg) by mouth At Bedtime 90 tablet 3     naltrexone (DEPADE/REVIA) 50 MG tablet Take 1 tablet (50 mg) by mouth daily       nicotine (NICODERM CQ) 14 MG/24HR 24 hr patch Place 1 patch onto the skin daily       nicotine 2 MG MT lozenge Place 1 lozenge (2 mg) inside cheek every hour as needed for smoking cessation 108 lozenge 11     ondansetron (ZOFRAN-ODT) 4 MG ODT tab Take 1 tablet (4 mg) by mouth every 12 hours as needed for nausea or vomiting       pantoprazole (PROTONIX) 40 MG EC tablet Take 1 tablet (40 mg) by mouth 2 times daily 120 tablet 0     polyethylene glycol (MIRALAX) 17 g packet Take 17 g by mouth daily       propranolol (INDERAL) 20 MG tablet Take 1 tablet (20 mg) by mouth 3 times daily 180 tablet 3     senna-docusate (SENOKOT-S/PERICOLACE) 8.6-50 MG tablet Take 1-2 tablets by mouth daily as needed for constipation Take while on oral narcotics to prevent or treat constipation. 10 tablet 0     spironolactone (ALDACTONE) 50 MG tablet Take 50 mg by mouth daily       traZODone (DESYREL) 50 MG tablet Take 50 mg by mouth At Bedtime , May repeat x1 prn for ongoing insomnia       vitamin B1 (THIAMINE) 100 MG tablet Take 1 tablet (100 mg) by mouth  "daily       order for DME Equipment being ordered: 4 wheel walker 1 Units 0     order for DME Equipment being ordered: 2 pairs thigh high compression stockings, strength per patient preference 2 Units 1     order for DME Equipment being ordered: Compression Stockings TWO (2) Pairs, 15-20 mm Hg. 2 Package prn     order for DME Equipment being ordered: bed pull up bar 1 Units 0     order for DME Equipment being ordered: shower chair 1 Device 0     REVIEW OF SYSTEMS:  Limited secondary to cognitive impairment but today pt reports no pain, no dyspnea, no cough, still feeling weak due to inability to sleep    Objective:  /55   Pulse 62   Temp 97.9  F (36.6  C)   Resp 18   Ht 1.676 m (5' 6\")   Wt 74 kg (163 lb 3.2 oz)   SpO2 97%   BMI 26.34 kg/m    Exam:  GENERAL APPEARANCE:  Alert, in no acute distress   HEAD:  Normal, normocephalic, atraumatic  ENT:  Mouth and posterior oropharynx normal, moist mucous membranes, hearing acuity - within normal limits   EYE EXAM:  EOM, conjunctivae, lids, pupils and irises normal   CHEST/RESP:  respiratory effort normal, no respiratory distress, lung sounds diminished but clear   CV:  Rate and rhythm reg, no murmur/rub/gallop, trace peripheral edema  M/S:   extremities normal, gait abnormal-walking only short distances with staff, digits and nails within normal limits   SKIN:  Penile wound on ventral side near the glans  is clean, without drainage or foul smell, and full thickness.  Able to see the ga in the base of the wound.   NEUROLOGIC EXAM: Normal gross motor movement, tone and coordination. No tremor. Cranial nerves 2-12 are normal tested and grossly at patient's baseline  PSYCH:  Alert and oriented to self and surroundings with forgetfulness, confusion, affect pleasant      Labs:     Most Recent 3 CBC's:  Recent Labs   Lab Test 07/06/20  0710 06/29/20  0715 06/23/20  0630   WBC 10.0 13.8* 16.8*   HGB 7.7* 8.4* 7.8*    103* 106*    383 183     Most " Recent 3 BMP's:  Recent Labs   Lab Test 07/06/20  0710 06/29/20  0715 06/25/20  0607    135 138   POTASSIUM 4.5 4.1 4.5   CHLORIDE 105 109 109   CO2 27 18* 17*   BUN 22 35* 43*   CR 1.52* 1.64* 1.49*   ANIONGAP 4 8 12   BEE 8.9 9.1 8.7   GLC 67* 75 76     Most Recent 2 LFT's:  Recent Labs   Lab Test 06/29/20  0715 06/23/20  0630   AST 69* 78*   ALT 77* 57   ALKPHOS 756* 738*   BILITOTAL 1.1 0.9       ASSESSMENT/PLAN:  Open wound of penis, subsequent encounter  Chong catheter in place on admission  This is a difficult spot for a wound, as his penis is short and retracts easily, pushing the dressing off the wound.  The wound itself is clean and without drainage or foul smell, no slough and improving a bit in size  -BID dressing change as below, no need for dressing as it does not stay in place  -wound care NP consult    Alcoholic cirrhosis of liver with ascites (H)  Other constipation  He has absolutely refused to take the lactulose and it makes him vomit.  Miralax daily producing about 1 BM per day.  We do not have the ability to check an ammonia level in TCU due to the specific handling requirements of the specimen.  His mentation is not worsening, no increased ascites and LFT last week as noted above.   -monitor bowels daily, consider increase of miralax if needed  -check LFTs 7/13/20     CKD (chronic kidney disease) stage 3, GFR 30-59 ml/min (H)  Baseline creat 1.7.  Most recent stable at 1.52.   -Avoid nephrotoxic medications  -Renal dosing of medications  -monitor kidney function 7/13/20      Closed fracture of multiple ribs of right side with routine healing, subsequent encounter  Recurrent falls  Pain improving slowly, no falling.  Using tylenol, lidocaine patches, for pain control.  Will discontinue baclofen.     Secondary esophageal varices with bleeding (H)  Iron deficiency anemia due to chronic blood loss  Baseline hemoglobin 7-9, most recent today at 7.7 which is decreased a bit from last week but  at baseline for him.  -monitor hemoglobin 7/13/20    -discontinue aspirin (which he was on for DVT prophylaxis after hip surgery)      transcribed by : Miranda Nguyễn  1. Trazodone 50 mg at bedtime prn po for insomnia - ok to give prn dose up until 2 am if unable to sleep with scheduled dose  2. Discontinue baclofen  3. Discontinue aspirin  4. Labs 7/13/20    CBC - dx: anemia    CMP - dx: liver cirrhosis   5. Clarify dressing to penile wound as follows, done BID    Cleanse    Apply triad paste    No need for dressing as it does not stick  6. Discontinue magic cup - refuses      Electronically signed by:  LOPEZ Campbell CNP

## 2020-07-06 ENCOUNTER — HOSPITAL LABORATORY (OUTPATIENT)
Facility: OTHER | Age: 60
End: 2020-07-06

## 2020-07-06 ENCOUNTER — NURSING HOME VISIT (OUTPATIENT)
Dept: GERIATRICS | Facility: CLINIC | Age: 60
End: 2020-07-06
Payer: COMMERCIAL

## 2020-07-06 VITALS
WEIGHT: 163.2 LBS | BODY MASS INDEX: 26.23 KG/M2 | HEIGHT: 66 IN | OXYGEN SATURATION: 97 % | HEART RATE: 62 BPM | SYSTOLIC BLOOD PRESSURE: 108 MMHG | RESPIRATION RATE: 18 BRPM | TEMPERATURE: 97.9 F | DIASTOLIC BLOOD PRESSURE: 55 MMHG

## 2020-07-06 DIAGNOSIS — I85.11 SECONDARY ESOPHAGEAL VARICES WITH BLEEDING (H): ICD-10-CM

## 2020-07-06 DIAGNOSIS — S31.20XD OPEN WOUND OF PENIS, SUBSEQUENT ENCOUNTER: Primary | ICD-10-CM

## 2020-07-06 DIAGNOSIS — K70.31 ALCOHOLIC CIRRHOSIS OF LIVER WITH ASCITES (H): ICD-10-CM

## 2020-07-06 DIAGNOSIS — N18.30 CKD (CHRONIC KIDNEY DISEASE) STAGE 3, GFR 30-59 ML/MIN (H): ICD-10-CM

## 2020-07-06 DIAGNOSIS — K59.09 OTHER CONSTIPATION: ICD-10-CM

## 2020-07-06 DIAGNOSIS — R29.6 RECURRENT FALLS: ICD-10-CM

## 2020-07-06 DIAGNOSIS — S22.41XD CLOSED FRACTURE OF MULTIPLE RIBS OF RIGHT SIDE WITH ROUTINE HEALING, SUBSEQUENT ENCOUNTER: ICD-10-CM

## 2020-07-06 DIAGNOSIS — D50.0 IRON DEFICIENCY ANEMIA DUE TO CHRONIC BLOOD LOSS: ICD-10-CM

## 2020-07-06 DIAGNOSIS — Z97.8 FOLEY CATHETER IN PLACE ON ADMISSION: ICD-10-CM

## 2020-07-06 LAB
ANION GAP SERPL CALCULATED.3IONS-SCNC: 4 MMOL/L (ref 3–14)
BUN SERPL-MCNC: 22 MG/DL (ref 7–30)
CALCIUM SERPL-MCNC: 8.9 MG/DL (ref 8.5–10.1)
CHLORIDE SERPL-SCNC: 105 MMOL/L (ref 94–109)
CO2 SERPL-SCNC: 27 MMOL/L (ref 20–32)
CREAT SERPL-MCNC: 1.52 MG/DL (ref 0.66–1.25)
ERYTHROCYTE [DISTWIDTH] IN BLOOD BY AUTOMATED COUNT: 21.3 % (ref 10–15)
GFR SERPL CREATININE-BSD FRML MDRD: 49 ML/MIN/{1.73_M2}
GLUCOSE SERPL-MCNC: 67 MG/DL (ref 70–99)
HCT VFR BLD AUTO: 25.4 % (ref 40–53)
HGB BLD-MCNC: 7.7 G/DL (ref 13.3–17.7)
MCH RBC QN AUTO: 30.2 PG (ref 26.5–33)
MCHC RBC AUTO-ENTMCNC: 30.3 G/DL (ref 31.5–36.5)
MCV RBC AUTO: 100 FL (ref 78–100)
PLATELET # BLD AUTO: 258 10E9/L (ref 150–450)
POTASSIUM SERPL-SCNC: 4.5 MMOL/L (ref 3.4–5.3)
RBC # BLD AUTO: 2.55 10E12/L (ref 4.4–5.9)
SODIUM SERPL-SCNC: 136 MMOL/L (ref 133–144)
WBC # BLD AUTO: 10 10E9/L (ref 4–11)

## 2020-07-06 PROCEDURE — 99309 SBSQ NF CARE MODERATE MDM 30: CPT | Performed by: NURSE PRACTITIONER

## 2020-07-06 RX ORDER — POLYETHYLENE GLYCOL 3350 17 G/17G
17 POWDER, FOR SOLUTION ORAL DAILY
Status: ON HOLD
Start: 2020-07-06 | End: 2020-08-29

## 2020-07-06 RX ORDER — PROPRANOLOL HYDROCHLORIDE 20 MG/1
20 TABLET ORAL 3 TIMES DAILY
Qty: 180 TABLET | Refills: 3
Start: 2020-07-06 | End: 2020-07-15

## 2020-07-06 ASSESSMENT — MIFFLIN-ST. JEOR
SCORE: 1488.02
SCORE: 1488.02

## 2020-07-06 NOTE — LETTER
7/6/2020        RE: Abhijeet Mccauley  [address] [place] [id number]        [facility]  [facility] Record Number: [id number]  Place of Service where encounter took place: [facility] [[id number]]  Chief Complaint   Patient presents with     RECHECK       HPI:    Abhijeet Mccauley is a 59 year old (1960), who is being seen today for an episodic care visit. HPI information obtained from: facility chart records, facility staff, patient report and [facility] chart review. Today's concern is:     Open wound of penis, subsequent encounter  Ga catheter in place on admission  Alcoholic cirrhosis of liver with ascites (H)  CKD (chronic kidney disease) stage 3, GFR 30-59 ml/min (H)  Closed fracture of multiple ribs of right side with routine healing, subsequent encounter  Recurrent falls  Secondary esophageal varices with bleeding (H)  Iron deficiency anemia due to chronic blood loss  Other constipation     Abhijeet reports he continues to be very fatigued, unable to sleep and this is limiting his ability to improve in strength and function. He reports he will sometimes take an additional trazodone at home and this is helpful to him.  Nursing reports dark urine in ga bag over the weekend, and needs help with dressing to penile wound as it is difficult to keep the dressing in place.       Past Medical and Surgical History reviewed in Epic today.    MEDICATIONS:    Current Outpatient Medications   Medication Sig Dispense Refill     acetaminophen (TYLENOL) 325 MG tablet Take 2 tablets (650 mg) by mouth 3 times daily       albuterol (VENTOLIN HFA) 108 (90 Base) MCG/ACT inhaler Inhale 2 puffs into the lungs every 4 hours as needed for shortness of breath / dyspnea or wheezing 18 g 11     alum & mag hydroxide-simethicone (MAALOX  ES) 400-400-40 MG/5ML SUSP suspension Take 30 mLs by mouth every 6 hours as needed for indigestion or heartburn       atorvastatin 20 MG PO  tablet Take 1 tablet (20 mg) by mouth daily 90 tablet 3     buPROPion (WELLBUTRIN SR) 150 MG 12 hr tablet Take 150 mg by mouth 2 times daily       diclofenac (VOLTAREN) 1 % topical gel PLACE 2 GRAMS ONTO THE SKIN FOUR TIMES A DAY AS NEEDED FOR MODERATE PAIN 100 g 11     DULERA 200-5 MCG/ACT inhaler INHALE TWO PUFFS BY MOUTH TWICE A DAY 13 g 0     folic acid (FOLVITE) 1 MG tablet Take 1 tablet (1 mg) by mouth daily       furosemide (LASIX) 20 MG tablet Take 20 mg by mouth daily       gabapentin (NEURONTIN) 300 MG capsule Take 600 mg by mouth 2 times daily        Lidocaine (LIDOCARE) 4 % Patch Place 1 patch onto the skin every 24 hours To prevent lidocaine toxicity, patient should be patch free for 12 hrs daily.       melatonin 3 MG tablet Take 1 tablet (3 mg) by mouth nightly as needed for sleep       montelukast (SINGULAIR) 10 MG tablet Take 1 tablet (10 mg) by mouth At Bedtime 90 tablet 3     naltrexone (DEPADE/REVIA) 50 MG tablet Take 1 tablet (50 mg) by mouth daily       nicotine (NICODERM CQ) 14 MG/24HR 24 hr patch Place 1 patch onto the skin daily       nicotine 2 MG MT lozenge Place 1 lozenge (2 mg) inside cheek every hour as needed for smoking cessation 108 lozenge 11     ondansetron (ZOFRAN-ODT) 4 MG ODT tab Take 1 tablet (4 mg) by mouth every 12 hours as needed for nausea or vomiting       pantoprazole (PROTONIX) 40 MG EC tablet Take 1 tablet (40 mg) by mouth 2 times daily 120 tablet 0     polyethylene glycol (MIRALAX) 17 g packet Take 17 g by mouth daily       propranolol (INDERAL) 20 MG tablet Take 1 tablet (20 mg) by mouth 3 times daily 180 tablet 3     senna-docusate (SENOKOT-S/PERICOLACE) 8.6-50 MG tablet Take 1-2 tablets by mouth daily as needed for constipation Take while on oral narcotics to prevent or treat constipation. 10 tablet 0     spironolactone (ALDACTONE) 50 MG tablet Take 50 mg by mouth daily       traZODone (DESYREL) 50 MG tablet Take 50 mg by mouth At Bedtime , May repeat x1 prn for  "ongoing insomnia       vitamin B1 (THIAMINE) 100 MG tablet Take 1 tablet (100 mg) by mouth daily       order for DME Equipment being ordered: 4 wheel walker 1 Units 0     order for DME Equipment being ordered: 2 pairs thigh high compression stockings, strength per patient preference 2 Units 1     order for DME Equipment being ordered: Compression Stockings TWO (2) Pairs, 15-20 mm Hg. 2 Package prn     order for DME Equipment being ordered: bed pull up bar 1 Units 0     order for DME Equipment being ordered: shower chair 1 Device 0     REVIEW OF SYSTEMS:  Limited secondary to cognitive impairment but today pt reports no pain, no dyspnea, no cough, still feeling weak due to inability to sleep    Objective:  /55   Pulse 62   Temp 97.9  F (36.6  C)   Resp 18   Ht 1.676 m (5' 6\")   Wt 74 kg (163 lb 3.2 oz)   SpO2 97%   BMI 26.34 kg/m    Exam:  GENERAL APPEARANCE:  Alert, in no acute distress   HEAD:  Normal, normocephalic, atraumatic  ENT:  Mouth and posterior oropharynx normal, moist mucous membranes, hearing acuity - within normal limits   EYE EXAM:  EOM, conjunctivae, lids, pupils and irises normal   CHEST/RESP:  respiratory effort normal, no respiratory distress, lung sounds diminished but clear   CV:  Rate and rhythm reg, no murmur/rub/gallop, trace peripheral edema  M/S:   extremities normal, gait abnormal-walking only short distances with staff, digits and nails within normal limits   SKIN:  Penile wound on ventral side near the glans  is clean, without drainage or foul smell, and full thickness.  Able to see the ga in the base of the wound.   NEUROLOGIC EXAM: Normal gross motor movement, tone and coordination. No tremor. Cranial nerves 2-12 are normal tested and grossly at patient's baseline  PSYCH:  Alert and oriented to self and surroundings with forgetfulness, confusion, affect pleasant      Labs:     Most Recent 3 CBC's:  Recent Labs   Lab Test 07/06/20  0710 06/29/20  0715 06/23/20  0630 "   WBC 10.0 13.8* 16.8*   HGB 7.7* 8.4* 7.8*    103* 106*    383 183     Most Recent 3 BMP's:  Recent Labs   Lab Test 07/06/20  0710 06/29/20  0715 06/25/20  0607    135 138   POTASSIUM 4.5 4.1 4.5   CHLORIDE 105 109 109   CO2 27 18* 17*   BUN 22 35* 43*   CR 1.52* 1.64* 1.49*   ANIONGAP 4 8 12   BEE 8.9 9.1 8.7   GLC 67* 75 76     Most Recent 2 LFT's:  Recent Labs   Lab Test 06/29/20  0715 06/23/20  0630   AST 69* 78*   ALT 77* 57   ALKPHOS 756* 738*   BILITOTAL 1.1 0.9       ASSESSMENT/PLAN:  Open wound of penis, subsequent encounter  Chong catheter in place on admission  This is a difficult spot for a wound, as his penis is short and retracts easily, pushing the dressing off the wound.  The wound itself is clean and without drainage or foul smell, no slough and improving a bit in size  -BID dressing change as below, no need for dressing as it does not stay in place  -wound care NP consult    Alcoholic cirrhosis of liver with ascites (H)  Other constipation  He has absolutely refused to take the lactulose and it makes him vomit.  Miralax daily producing about 1 BM per day.  We do not have the ability to check an ammonia level in TCU due to the specific handling requirements of the specimen.  His mentation is not worsening, no increased ascites and LFT last week as noted above.   -monitor bowels daily, consider increase of miralax if needed  -check LFTs 7/13/20     CKD (chronic kidney disease) stage 3, GFR 30-59 ml/min (H)  Baseline creat 1.7.  Most recent stable at 1.52.   -Avoid nephrotoxic medications  -Renal dosing of medications  -monitor kidney function 7/13/20      Closed fracture of multiple ribs of right side with routine healing, subsequent encounter  Recurrent falls  Pain improving slowly, no falling.  Using tylenol, lidocaine patches, for pain control.  Will discontinue baclofen.     Secondary esophageal varices with bleeding (H)  Iron deficiency anemia due to chronic blood  loss  Baseline hemoglobin 7-9, most recent today at 7.7 which is decreased a bit from last week but at baseline for him.  -monitor hemoglobin 7/13/20    -discontinue aspirin (which he was on for DVT prophylaxis after hip surgery)      transcribed by : Miranda Nguyễn  1. Trazodone 50 mg at bedtime prn po for insomnia - ok to give prn dose up until 2 am if unable to sleep with scheduled dose  2. Discontinue baclofen  3. Discontinue aspirin  4. Labs 7/13/20    CBC - dx: anemia    CMP - dx: liver cirrhosis   5. Clarify dressing to penile wound as follows, done BID    Cleanse    Apply triad paste    No need for dressing as it does not stick  6. Discontinue magic cup - refuses      Electronically signed by:  LOPEZ Campbell CNP         Sincerely,        LOPEZ Campbell CNP

## 2020-07-07 ENCOUNTER — VIRTUAL VISIT (OUTPATIENT)
Dept: GERIATRICS | Facility: CLINIC | Age: 60
End: 2020-07-07
Payer: COMMERCIAL

## 2020-07-07 VITALS
RESPIRATION RATE: 18 BRPM | DIASTOLIC BLOOD PRESSURE: 55 MMHG | HEART RATE: 62 BPM | HEIGHT: 66 IN | OXYGEN SATURATION: 99 % | TEMPERATURE: 97.9 F | WEIGHT: 163.2 LBS | SYSTOLIC BLOOD PRESSURE: 108 MMHG | BODY MASS INDEX: 26.23 KG/M2

## 2020-07-07 DIAGNOSIS — N13.8 URINARY TRACT OBSTRUCTION BY KIDNEY STONE: ICD-10-CM

## 2020-07-07 DIAGNOSIS — R56.9 ALCOHOL WITHDRAWAL SEIZURE WITHOUT COMPLICATION (H): ICD-10-CM

## 2020-07-07 DIAGNOSIS — N30.00 ACUTE CYSTITIS WITHOUT HEMATURIA: ICD-10-CM

## 2020-07-07 DIAGNOSIS — F10.10 ALCOHOL ABUSE: ICD-10-CM

## 2020-07-07 DIAGNOSIS — F10.930 ALCOHOL WITHDRAWAL SEIZURE WITHOUT COMPLICATION (H): ICD-10-CM

## 2020-07-07 DIAGNOSIS — N20.0 URINARY TRACT OBSTRUCTION BY KIDNEY STONE: ICD-10-CM

## 2020-07-07 DIAGNOSIS — K70.31 ALCOHOLIC CIRRHOSIS OF LIVER WITH ASCITES (H): ICD-10-CM

## 2020-07-07 DIAGNOSIS — A49.8 INFECTION CAUSED BY ENTEROBACTER CLOACAE: ICD-10-CM

## 2020-07-07 DIAGNOSIS — R78.81 BACTEREMIA: Primary | ICD-10-CM

## 2020-07-07 DIAGNOSIS — S31.501D: ICD-10-CM

## 2020-07-07 DIAGNOSIS — S22.49XD CLOSED FRACTURE OF MULTIPLE RIBS WITH ROUTINE HEALING, UNSPECIFIED LATERALITY, SUBSEQUENT ENCOUNTER: ICD-10-CM

## 2020-07-07 PROCEDURE — 99305 1ST NF CARE MODERATE MDM 35: CPT | Mod: 95 | Performed by: FAMILY MEDICINE

## 2020-07-07 NOTE — LETTER
"    7/7/2020        RE: Abhijeet Mccauley  [address] [place] [id number]         [facility]  Abhijeet Mccauley is being evaluated via a billable video visit due to the restrictions of the Covid-19 pandemic.   The patient has been notified of following:  \"This video visit will be conducted via a call between you and your provider. We have found that certain health care needs can be provided without the need for an in-person physical exam.  This service lets us provide the care you need with a video conversation. If during the course of the call the provider feels a video visit is not appropriate, you will not be charged for this service.\"   The provider has received verbal consent for a Video Visit from the patient and or first contact? Yes  Patient/facility staff would like the video invitation sent by: N/A   Video Start Time: 10:53  Which Facility the Patient is at during the time of visit: [facility]   Received verbal consent to use Care Everywhere in order to access labs, documents, histories, and all other needed information to provide care at current facility.  PRIMARY CARE PROVIDER AND CLINIC: Chidi Valenzuela MD, [address] [facility] [address]; Phone: [phone]; Fax: [phone]  Chief Complaint   Patient presents with     Hospital F/U     [facility] Medical Record Number: [id number]  Abhijeet Mccauley is a 59 year old (1960), admitted to the above facility from  [facility]. Hospital stay 06/12/20 through 06/25/20. Admitted to this facility for rehab, medical management and nursing care.  HPI:    HPI information obtained from: facility staff, patient report and [facility] chart review.   Brief Summary of Hospital Course:   - patient with PMH notable for alcoholism, with recurrent falls, hospitalzied for metabolic encephalopathy, alcohol withdrawal with sz, pulmonary edema and acute respiratory failure, sedated and intubated. Was found to " have sepsis and UTI,  Bacteremia and left upper pole obstructing stone, treated with abx.   -  Work up revealed portal HTN, alcoholic cirrhosis, and ascites s/p paracentesis, and brief BRBPR     -   Updates on Status Since Skilled nursing Admission:   - Pt seen in the presence of RN who graciously assisted with the virtual visit  - Pt acing pain over his hip (recent replacement) 2/2 rehab, better with meds.   - reports hard for him to talk now, but denies SOB, cough,  Wheezing, or sore throat. Endorses that he does not rinse his mouth after using the inhaler, and it getting worse.   - denies fever, chills. Endorses drink enough water.   - reports the lesion over his penis is getting smaller and less painful.       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 He also has no past medical history of Complication of anesthesia, Diabetes (H), Heart disease, PONV (postoperative nausea and vomiting), or Sleep apnea.  PAST SURGICAL HISTORY:   has a past surgical history that includes Esophagoscopy, gastroscopy, duodenoscopy (EGD), combined (N/A, 2/8/2018); TOTAL KNEE ARTHROPLASTY (Left); Laceration repair (Right); Esophagoscopy, gastroscopy, duodenoscopy (EGD), combined (N/A, 3/18/2018); Bone marrow biopsy, bone specimen, needle/trocar (N/A,  6/12/2018); Phacoemulsification with standard intraocular lens implant (Left, 7/1/2019); Phacoemulsification with standard intraocular lens implant (Right, 7/29/2019); Esophagoscopy, gastroscopy, duodenoscopy (EGD), combined (N/A, 2/28/2020); Arthroplasty hip (Left, 5/29/2020); Cystoscopy, retrogrades, insert stent ureter(s), combined (Left, 6/13/2020); Percutaneous nephrostomy (Left, 6/13/2020); IR Paracentesis (6/22/2020); PICC/Midline Placement (Left, 06/25/2020); and IR Nephrostomy Tube Placement Left (6/13/2020).  FAMILY HISTORY: family history includes Coronary Artery Disease in his father; Hypertension in his mother.  SOCIAL HISTORY:   reports that he has been smoking cigarettes. He has a 15.00 pack-year smoking history. He has never used smokeless tobacco. He reports current alcohol use. He reports current drug use. Drug: Marijuana.  Current Outpatient Medications   Medication Sig Dispense Refill     acetaminophen (TYLENOL) 325 MG tablet Take 2 tablets (650 mg) by mouth 3 times daily       albuterol (VENTOLIN HFA) 108 (90 Base) MCG/ACT inhaler Inhale 2 puffs into the lungs every 4 hours as needed for shortness of breath / dyspnea or wheezing 18 g 11     alum & mag hydroxide-simethicone (MAALOX  ES) 400-400-40 MG/5ML SUSP suspension Take 30 mLs by mouth every 6 hours as needed for indigestion or heartburn       atorvastatin 20 MG PO tablet Take 1 tablet (20 mg) by mouth daily 90 tablet 3     buPROPion (WELLBUTRIN SR) 150 MG 12 hr tablet Take 150 mg by mouth 2 times daily       diclofenac (VOLTAREN) 1 % topical gel PLACE 2 GRAMS ONTO THE SKIN FOUR TIMES A DAY AS NEEDED FOR MODERATE PAIN 100 g 11     DULERA 200-5 MCG/ACT inhaler INHALE TWO PUFFS BY MOUTH TWICE A DAY 13 g 0     folic acid (FOLVITE) 1 MG tablet Take 1 tablet (1 mg) by mouth daily       furosemide (LASIX) 20 MG tablet Take 20 mg by mouth daily       gabapentin (NEURONTIN) 300 MG capsule Take 300 mg by mouth 2 times daily       Lidocaine (LIDOCARE)  4 % Patch Place 1 patch onto the skin every 24 hours To prevent lidocaine toxicity, patient should be patch free for 12 hrs daily.       melatonin 3 MG tablet Take 1 tablet (3 mg) by mouth nightly as needed for sleep       montelukast (SINGULAIR) 10 MG tablet Take 1 tablet (10 mg) by mouth At Bedtime 90 tablet 3     nicotine (NICODERM CQ) 14 MG/24HR 24 hr patch Place 1 patch onto the skin daily       nicotine 2 MG MT lozenge Place 1 lozenge (2 mg) inside cheek every hour as needed for smoking cessation 108 lozenge 11     ondansetron (ZOFRAN-ODT) 4 MG ODT tab Take 1 tablet (4 mg) by mouth every 12 hours as needed for nausea or vomiting       order for DME Equipment being ordered: 4 wheel walker 1 Units 0     order for DME Equipment being ordered: 2 pairs thigh high compression stockings, strength per patient preference 2 Units 1     order for DME Equipment being ordered: Compression Stockings TWO (2) Pairs, 15-20 mm Hg. 2 Package prn     order for DME Equipment being ordered: bed pull up bar 1 Units 0     order for DME Equipment being ordered: shower chair 1 Device 0     pantoprazole (PROTONIX) 40 MG EC tablet Take 1 tablet (40 mg) by mouth 2 times daily 120 tablet 0     polyethylene glycol (MIRALAX) 17 g packet Take 17 g by mouth daily       propranolol (INDERAL) 20 MG tablet Take 1 tablet (20 mg) by mouth 3 times daily 180 tablet 3     senna-docusate (SENOKOT-S/PERICOLACE) 8.6-50 MG tablet Take 1-2 tablets by mouth daily as needed for constipation Take while on oral narcotics to prevent or treat constipation. 10 tablet 0     spironolactone (ALDACTONE) 50 MG tablet Take 25 mg by mouth daily       traZODone (DESYREL) 50 MG tablet Take 50 mg by mouth At Bedtime , May repeat x1 prn for ongoing insomnia       vitamin B1 (THIAMINE) 100 MG tablet Take 1 tablet (100 mg) by mouth daily         ROS: 10 point ROS of systems including Constitutional, Eyes, Respiratory, Cardiovascular, Gastroenterology, Genitourinary,  "Integumentary, Musculoskeletal, Psychiatric were all negative except for pertinent positives noted in my HPI.  Vitals:/55   Pulse 62   Temp 97.9  F (36.6  C)   Resp 18   Ht 1.676 m (5' 6\")   Wt 74 kg (163 lb 3.2 oz)   SpO2 99%   BMI 26.34 kg/m     Limited Visit Exam done given COVID-19 precautions:  GENERAL APPEARANCE:  in no distress  RESP:  unlabored breathing  M/S:   no joint deformity noted  SKIN:  no rash noted  NEURO:   no purposeful movement in upper and lower extremities  PSYCH:  affect and mood normal    Lab/Diagnostic data: Reviewed in the chart and EHR.      ASSESSMENT/PLAN:  ---------------------------------  Sepsis with bacteremia and  UTI  GENEVIEVE in hx of CKD (H)  Left upper pole obstructing stone s/p left ureteral stent placement (6/13)  - clinically stable. Completed abx.   - - Avoid nephrotoxic drugs. Renal dose the medications.   - Urology follow up      Complicated Alcohol dependence  Recent withdrawal and sz  Hepatic cirrhosis with ascites s/p paracentesis (H)  Portal Hypertension  - stable.   - avoid hepatotoxic meds.       Hx of S/p hip replacement 2/2 avascular necrosis of left hip bone  Recurrent Falls  Right 3-8 Ribs Fractures  Physical deconditioning  - started rehab program, making a progress, continue until desired goal is achieved.   - analgesia optimal.       Acute on chronic macrocytic anemia: trend HH. Stable.   Genital wound: improving.   Hoarseness: likely 2/2 inhaler use. Could not see white patch over oral cavity on the phone. Will have GNP examine the patient in am.     Order:  rinse mouth after each inhaler.     Electronically signed by:  Kellen Miguel MD    Video-Visit Details  Type of service:  Video Visit  Video End Time (time video stopped): 11:02  Distant Location (provider location):  [facility]       Sincerely,        Kellen Miguel MD    "

## 2020-07-07 NOTE — LETTER
"    7/7/2020        RE: Abhijeet Mccauley  [address] [place] [id number]         [facility]  Abhijeet Mccauley is being evaluated via a billable video visit due to the restrictions of the Covid-19 pandemic.   The patient has been notified of following:  \"This video visit will be conducted via a call between you and your provider. We have found that certain health care needs can be provided without the need for an in-person physical exam.  This service lets us provide the care you need with a video conversation. If during the course of the call the provider feels a video visit is not appropriate, you will not be charged for this service.\"   The provider has received verbal consent for a Video Visit from the patient and or first contact? Yes  Patient/facility staff would like the video invitation sent by: N/A   Video Start Time: 10:53  Which Facility the Patient is at during the time of visit: [facility]   Received verbal consent to use Care Everywhere in order to access labs, documents, histories, and all other needed information to provide care at current facility.  PRIMARY CARE PROVIDER AND CLINIC: Chidi Valenzuela MD, [address] [facility] [address]; Phone: [phone]; Fax: [phone]  Chief Complaint   Patient presents with     Hospital F/U     [facility] Medical Record Number: [id number]  Abhijeet Mccauley is a 59 year old (1960), admitted to the above facility from  [facility]. Hospital stay 06/12/20 through 06/25/20. Admitted to this facility for rehab, medical management and nursing care.  HPI:    HPI information obtained from: facility staff, patient report and [facility] chart review.   Brief Summary of Hospital Course:   - patient with PMH notable for alcoholism, with recurrent falls, hospitalzied for metabolic encephalopathy, alcohol withdrawal with sz, pulmonary edema and acute respiratory failure, sedated and intubated. Was found to " have sepsis and UTI,  Bacteremia and left upper pole obstructing stone, treated with abx.   -  Work up revealed portal HTN, alcoholic cirrhosis, and ascites s/p paracentesis, and brief BRBPR     -   Updates on Status Since Skilled nursing Admission:   - Pt seen in the presence of RN who graciously assisted with the virtual visit  - Pt acing pain over his hip (recent replacement) 2/2 rehab, better with meds.   - reports hard for him to talk now, but denies SOB, cough,  Wheezing, or sore throat. Endorses that he does not rinse his mouth after using the inhaler, and it getting worse.   - denies fever, chills. Endorses drink enough water.   - reports the lesion over his penis is getting smaller and less painful.       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 He also has no past medical history of Complication of anesthesia, Diabetes (H), Heart disease, PONV (postoperative nausea and vomiting), or Sleep apnea.  PAST SURGICAL HISTORY:   has a past surgical history that includes Esophagoscopy, gastroscopy, duodenoscopy (EGD), combined (N/A, 2/8/2018); TOTAL KNEE ARTHROPLASTY (Left); Laceration repair (Right); Esophagoscopy, gastroscopy, duodenoscopy (EGD), combined (N/A, 3/18/2018); Bone marrow biopsy, bone specimen, needle/trocar (N/A,  6/12/2018); Phacoemulsification with standard intraocular lens implant (Left, 7/1/2019); Phacoemulsification with standard intraocular lens implant (Right, 7/29/2019); Esophagoscopy, gastroscopy, duodenoscopy (EGD), combined (N/A, 2/28/2020); Arthroplasty hip (Left, 5/29/2020); Cystoscopy, retrogrades, insert stent ureter(s), combined (Left, 6/13/2020); Percutaneous nephrostomy (Left, 6/13/2020); IR Paracentesis (6/22/2020); PICC/Midline Placement (Left, 06/25/2020); and IR Nephrostomy Tube Placement Left (6/13/2020).  FAMILY HISTORY: family history includes Coronary Artery Disease in his father; Hypertension in his mother.  SOCIAL HISTORY:   reports that he has been smoking cigarettes. He has a 15.00 pack-year smoking history. He has never used smokeless tobacco. He reports current alcohol use. He reports current drug use. Drug: Marijuana.  Current Outpatient Medications   Medication Sig Dispense Refill     acetaminophen (TYLENOL) 325 MG tablet Take 2 tablets (650 mg) by mouth 3 times daily       albuterol (VENTOLIN HFA) 108 (90 Base) MCG/ACT inhaler Inhale 2 puffs into the lungs every 4 hours as needed for shortness of breath / dyspnea or wheezing 18 g 11     alum & mag hydroxide-simethicone (MAALOX  ES) 400-400-40 MG/5ML SUSP suspension Take 30 mLs by mouth every 6 hours as needed for indigestion or heartburn       atorvastatin 20 MG PO tablet Take 1 tablet (20 mg) by mouth daily 90 tablet 3     buPROPion (WELLBUTRIN SR) 150 MG 12 hr tablet Take 150 mg by mouth 2 times daily       diclofenac (VOLTAREN) 1 % topical gel PLACE 2 GRAMS ONTO THE SKIN FOUR TIMES A DAY AS NEEDED FOR MODERATE PAIN 100 g 11     DULERA 200-5 MCG/ACT inhaler INHALE TWO PUFFS BY MOUTH TWICE A DAY 13 g 0     folic acid (FOLVITE) 1 MG tablet Take 1 tablet (1 mg) by mouth daily       furosemide (LASIX) 20 MG tablet Take 20 mg by mouth daily       gabapentin (NEURONTIN) 300 MG capsule Take 300 mg by mouth 2 times daily       Lidocaine (LIDOCARE)  4 % Patch Place 1 patch onto the skin every 24 hours To prevent lidocaine toxicity, patient should be patch free for 12 hrs daily.       melatonin 3 MG tablet Take 1 tablet (3 mg) by mouth nightly as needed for sleep       montelukast (SINGULAIR) 10 MG tablet Take 1 tablet (10 mg) by mouth At Bedtime 90 tablet 3     nicotine (NICODERM CQ) 14 MG/24HR 24 hr patch Place 1 patch onto the skin daily       nicotine 2 MG MT lozenge Place 1 lozenge (2 mg) inside cheek every hour as needed for smoking cessation 108 lozenge 11     ondansetron (ZOFRAN-ODT) 4 MG ODT tab Take 1 tablet (4 mg) by mouth every 12 hours as needed for nausea or vomiting       order for DME Equipment being ordered: 4 wheel walker 1 Units 0     order for DME Equipment being ordered: 2 pairs thigh high compression stockings, strength per patient preference 2 Units 1     order for DME Equipment being ordered: Compression Stockings TWO (2) Pairs, 15-20 mm Hg. 2 Package prn     order for DME Equipment being ordered: bed pull up bar 1 Units 0     order for DME Equipment being ordered: shower chair 1 Device 0     pantoprazole (PROTONIX) 40 MG EC tablet Take 1 tablet (40 mg) by mouth 2 times daily 120 tablet 0     polyethylene glycol (MIRALAX) 17 g packet Take 17 g by mouth daily       propranolol (INDERAL) 20 MG tablet Take 1 tablet (20 mg) by mouth 3 times daily 180 tablet 3     senna-docusate (SENOKOT-S/PERICOLACE) 8.6-50 MG tablet Take 1-2 tablets by mouth daily as needed for constipation Take while on oral narcotics to prevent or treat constipation. 10 tablet 0     spironolactone (ALDACTONE) 50 MG tablet Take 25 mg by mouth daily       traZODone (DESYREL) 50 MG tablet Take 50 mg by mouth At Bedtime , May repeat x1 prn for ongoing insomnia       vitamin B1 (THIAMINE) 100 MG tablet Take 1 tablet (100 mg) by mouth daily         ROS: 10 point ROS of systems including Constitutional, Eyes, Respiratory, Cardiovascular, Gastroenterology, Genitourinary,  "Integumentary, Musculoskeletal, Psychiatric were all negative except for pertinent positives noted in my HPI.  Vitals:/55   Pulse 62   Temp 97.9  F (36.6  C)   Resp 18   Ht 1.676 m (5' 6\")   Wt 74 kg (163 lb 3.2 oz)   SpO2 99%   BMI 26.34 kg/m     Limited Visit Exam done given COVID-19 precautions:  GENERAL APPEARANCE:  in no distress  RESP:  unlabored breathing  M/S:   no joint deformity noted  SKIN:  no rash noted  NEURO:   no purposeful movement in upper and lower extremities  PSYCH:  affect and mood normal    Lab/Diagnostic data: Reviewed in the chart and EHR.      ASSESSMENT/PLAN:  ---------------------------------  Sepsis with bacteremia and  UTI  GENEVIEVE in hx of CKD (H)  Left upper pole obstructing stone s/p left ureteral stent placement (6/13)  - clinically stable. Completed abx.   - - Avoid nephrotoxic drugs. Renal dose the medications.   - Urology follow up      Complicated Alcohol dependence  Recent withdrawal and sz  Hepatic cirrhosis with ascites s/p paracentesis (H)  Portal Hypertension  - stable.   - avoid hepatotoxic meds.       Hx of S/p hip replacement 2/2 avascular necrosis of left hip bone  Recurrent Falls  Right 3-8 Ribs Fractures  Physical deconditioning  - started rehab program, making a progress, continue until desired goal is achieved.   - analgesia optimal.       Acute on chronic macrocytic anemia: trend HH. Stable.   Genital wound: improving.   Hoarseness: likely 2/2 inhaler use. Could not see white patch over oral cavity on the phone. Will have GNP examine the patient in am.     Order:  rinse mouth after each inhaler.     Electronically signed by:  Kellen Miguel MD    Video-Visit Details  Type of service:  Video Visit  Video End Time (time video stopped): 11:02  Distant Location (provider location):  [facility]       Sincerely,        Kellen Miguel MD    "

## 2020-07-08 ASSESSMENT — MIFFLIN-ST. JEOR: SCORE: 1488.02

## 2020-07-09 ENCOUNTER — NURSING HOME VISIT (OUTPATIENT)
Dept: GERIATRICS | Facility: CLINIC | Age: 60
End: 2020-07-09
Payer: COMMERCIAL

## 2020-07-09 VITALS
SYSTOLIC BLOOD PRESSURE: 111 MMHG | RESPIRATION RATE: 17 BRPM | OXYGEN SATURATION: 98 % | BODY MASS INDEX: 26.23 KG/M2 | TEMPERATURE: 97.9 F | HEART RATE: 66 BPM | HEIGHT: 66 IN | WEIGHT: 163.2 LBS | DIASTOLIC BLOOD PRESSURE: 66 MMHG

## 2020-07-09 DIAGNOSIS — J44.9 CHRONIC OBSTRUCTIVE PULMONARY DISEASE, UNSPECIFIED COPD TYPE (H): ICD-10-CM

## 2020-07-09 DIAGNOSIS — J96.01 ACUTE HYPOXEMIC RESPIRATORY FAILURE (H): Primary | ICD-10-CM

## 2020-07-09 DIAGNOSIS — K13.79 MOUTH PAIN: ICD-10-CM

## 2020-07-09 DIAGNOSIS — B37.0 THRUSH: ICD-10-CM

## 2020-07-09 PROCEDURE — 99309 SBSQ NF CARE MODERATE MDM 30: CPT | Performed by: NURSE PRACTITIONER

## 2020-07-09 RX ORDER — NYSTATIN 100000/ML
5 SUSPENSION, ORAL (FINAL DOSE FORM) ORAL 4 TIMES DAILY
COMMUNITY
Start: 2020-07-09 | End: 2020-07-13

## 2020-07-09 NOTE — LETTER
7/9/2020        RE: Abhijeet Mccauley  [address] [place] [address]        [facility]  [facility] Medical Record Number: [id number]  Place of Service where encounter took place[facility] [[id number]]  Chief Complaint   Patient presents with     RECHECK       HPI: Abhijeet Mccauley is a 59 year old (1960), who is being seen today for an episodic care visit. HPI information obtained from: facility chart records, facility staff, patient report and [facility] chart review. Today's concern is:     Acute hypoxemic respiratory failure (H)  Chronic obstructive pulmonary disease, unspecified COPD type (H)  Mouth pain  Thrush     Abhijeet reports he has a weak, hoarse voice which is not normal for him. He has been using dulera, singulair.  He has been rinsing his mouth.  Nursing reports seeing something white in his mouth.  He is not on routine oxygen.       Past Medical and Surgical History reviewed in Epic today.    MEDICATIONS:    Current Outpatient Medications   Medication Sig Dispense Refill     nystatin (MYCOSTATIN) 163431 UNIT/ML suspension Take 5 mLs by mouth 4 times daily For 7 days only       acetaminophen (TYLENOL) 325 MG tablet Take 2 tablets (650 mg) by mouth 3 times daily       albuterol (VENTOLIN HFA) 108 (90 Base) MCG/ACT inhaler Inhale 2 puffs into the lungs every 4 hours as needed for shortness of breath / dyspnea or wheezing 18 g 11     alum & mag hydroxide-simethicone (MAALOX  ES) 400-400-40 MG/5ML SUSP suspension Take 30 mLs by mouth every 6 hours as needed for indigestion or heartburn       atorvastatin 20 MG PO tablet Take 1 tablet (20 mg) by mouth daily 90 tablet 3     buPROPion (WELLBUTRIN SR) 150 MG 12 hr tablet Take 150 mg by mouth 2 times daily       diclofenac (VOLTAREN) 1 % topical gel PLACE 2 GRAMS ONTO THE SKIN FOUR TIMES A DAY AS NEEDED FOR MODERATE PAIN 100 g 11     DULERA 200-5 MCG/ACT inhaler INHALE TWO PUFFS BY MOUTH TWICE A DAY 13 g 0      folic acid (FOLVITE) 1 MG tablet Take 1 tablet (1 mg) by mouth daily       furosemide (LASIX) 20 MG tablet Take 20 mg by mouth daily       gabapentin (NEURONTIN) 300 MG capsule Take 600 mg by mouth 2 times daily        Lidocaine (LIDOCARE) 4 % Patch Place 1 patch onto the skin every 24 hours To prevent lidocaine toxicity, patient should be patch free for 12 hrs daily.       melatonin 3 MG tablet Take 1 tablet (3 mg) by mouth nightly as needed for sleep       montelukast (SINGULAIR) 10 MG tablet Take 1 tablet (10 mg) by mouth At Bedtime 90 tablet 3     naltrexone (DEPADE/REVIA) 50 MG tablet Take 1 tablet (50 mg) by mouth daily       nicotine (NICODERM CQ) 14 MG/24HR 24 hr patch Place 1 patch onto the skin daily       nicotine 2 MG MT lozenge Place 1 lozenge (2 mg) inside cheek every hour as needed for smoking cessation 108 lozenge 11     ondansetron (ZOFRAN-ODT) 4 MG ODT tab Take 1 tablet (4 mg) by mouth every 12 hours as needed for nausea or vomiting       order for DME Equipment being ordered: 4 wheel walker 1 Units 0     order for DME Equipment being ordered: 2 pairs thigh high compression stockings, strength per patient preference 2 Units 1     order for DME Equipment being ordered: Compression Stockings TWO (2) Pairs, 15-20 mm Hg. 2 Package prn     order for DME Equipment being ordered: bed pull up bar 1 Units 0     order for DME Equipment being ordered: shower chair 1 Device 0     pantoprazole (PROTONIX) 40 MG EC tablet Take 1 tablet (40 mg) by mouth 2 times daily 120 tablet 0     polyethylene glycol (MIRALAX) 17 g packet Take 17 g by mouth daily       propranolol (INDERAL) 20 MG tablet Take 1 tablet (20 mg) by mouth 3 times daily 180 tablet 3     senna-docusate (SENOKOT-S/PERICOLACE) 8.6-50 MG tablet Take 1-2 tablets by mouth daily as needed for constipation Take while on oral narcotics to prevent or treat constipation. 10 tablet 0     spironolactone (ALDACTONE) 50 MG tablet Take 50 mg by mouth daily        "traZODone (DESYREL) 50 MG tablet Take 50 mg by mouth At Bedtime , May repeat x1 prn for ongoing insomnia       vitamin B1 (THIAMINE) 100 MG tablet Take 1 tablet (100 mg) by mouth daily       REVIEW OF SYSTEMS:  4 point ROS including Respiratory, CV, GI and , other than that noted in the HPI,  is negative    Objective:  /66   Pulse 66   Temp 97.9  F (36.6  C)   Resp 17   Ht 1.676 m (5' 6\")   Wt 74 kg (163 lb 3.2 oz)   SpO2 98%   BMI 26.34 kg/m    Exam:  GENERAL APPEARANCE:  Alert, in no acute distress   HEAD:  Normal, normocephalic, atraumatic  ENT:  Mouth and posterior oropharynx whitish, with some reddened spots as well, dry mucous membranes, hearing acuity - within normal limits   EYE EXAM:  EOM, conjunctivae, lids, pupils and irises normal   CHEST/RESP:  respiratory effort normal, no respiratory distress, lung sounds CTA   CV:  Rate and rhythm reg, no murmur/rub/gallop, no peripheral edema  M/S:   extremities normal, gait normal-walking with rolling walker short distances, fatigues easily, digits and nails within normal limits   NEUROLOGIC EXAM: Normal gross motor movement, tone and coordination. No tremor. Cranial nerves 2-12 are normal tested and grossly at patient's baseline  PSYCH:  Alert and oriented to self and surroundings with forgetfulness , affect pleasant      Labs:   Recent labs in [facility] reviewed by me today.     ASSESSMENT/PLAN:  Acute hypoxemic respiratory failure (H)  Chronic obstructive pulmonary disease, unspecified COPD type (H)  Mouth pain  Thrush  He has a history of COPD, on Dulera (mometasone/formoterol) as well as singulair.  He is not O2 dependent.  He is forgetful.  He has had some raspy voice, throat congestion, and whitish area in his mouth.  Unclear etiology for this, but certainly could be related to inhaled corticosteroid and be thrush  -nystatin swish and swallow for thrush  -monitor ongoing need for inhalers given no respiratory symptoms      transcribed by team " coordinator: Miranda Nguyễn  1. Nystatin oral swish and swallow; 100,000 unit(s)/ml; give 5 ml QID x 7 days for swish and swallow - dx: thrush      Electronically signed by:  LOPEZ Campbell CNP         Sincerely,        LOPEZ Campbell CNP

## 2020-07-09 NOTE — PROGRESS NOTES
[facility]  [facility] Medical Record Number: [id number]  Place of Service where encounter took place[facility] [[id number]]  Chief Complaint   Patient presents with     RECHECK       HPI: Abhijeet Mccauley is a 59 year old (1960), who is being seen today for an episodic care visit. HPI information obtained from: facility chart records, facility staff, patient report and [facility] chart review. Today's concern is:     Acute hypoxemic respiratory failure (H)  Chronic obstructive pulmonary disease, unspecified COPD type (H)  Mouth pain  Thrush     Abhijeet reports he has a weak, hoarse voice which is not normal for him. He has been using dulera, singulair.  He has been rinsing his mouth.  Nursing reports seeing something white in his mouth.  He is not on routine oxygen.       Past Medical and Surgical History reviewed in Epic today.    MEDICATIONS:    Current Outpatient Medications   Medication Sig Dispense Refill     nystatin (MYCOSTATIN) 275981 UNIT/ML suspension Take 5 mLs by mouth 4 times daily For 7 days only       acetaminophen (TYLENOL) 325 MG tablet Take 2 tablets (650 mg) by mouth 3 times daily       albuterol (VENTOLIN HFA) 108 (90 Base) MCG/ACT inhaler Inhale 2 puffs into the lungs every 4 hours as needed for shortness of breath / dyspnea or wheezing 18 g 11     alum & mag hydroxide-simethicone (MAALOX  ES) 400-400-40 MG/5ML SUSP suspension Take 30 mLs by mouth every 6 hours as needed for indigestion or heartburn       atorvastatin 20 MG PO tablet Take 1 tablet (20 mg) by mouth daily 90 tablet 3     buPROPion (WELLBUTRIN SR) 150 MG 12 hr tablet Take 150 mg by mouth 2 times daily       diclofenac (VOLTAREN) 1 % topical gel PLACE 2 GRAMS ONTO THE SKIN FOUR TIMES A DAY AS NEEDED FOR MODERATE PAIN 100 g 11     DULERA 200-5 MCG/ACT inhaler INHALE TWO PUFFS BY MOUTH TWICE A DAY 13 g 0     folic acid (FOLVITE) 1 MG tablet Take 1 tablet (1 mg) by mouth daily        furosemide (LASIX) 20 MG tablet Take 20 mg by mouth daily       gabapentin (NEURONTIN) 300 MG capsule Take 600 mg by mouth 2 times daily        Lidocaine (LIDOCARE) 4 % Patch Place 1 patch onto the skin every 24 hours To prevent lidocaine toxicity, patient should be patch free for 12 hrs daily.       melatonin 3 MG tablet Take 1 tablet (3 mg) by mouth nightly as needed for sleep       montelukast (SINGULAIR) 10 MG tablet Take 1 tablet (10 mg) by mouth At Bedtime 90 tablet 3     naltrexone (DEPADE/REVIA) 50 MG tablet Take 1 tablet (50 mg) by mouth daily       nicotine (NICODERM CQ) 14 MG/24HR 24 hr patch Place 1 patch onto the skin daily       nicotine 2 MG MT lozenge Place 1 lozenge (2 mg) inside cheek every hour as needed for smoking cessation 108 lozenge 11     ondansetron (ZOFRAN-ODT) 4 MG ODT tab Take 1 tablet (4 mg) by mouth every 12 hours as needed for nausea or vomiting       order for DME Equipment being ordered: 4 wheel walker 1 Units 0     order for DME Equipment being ordered: 2 pairs thigh high compression stockings, strength per patient preference 2 Units 1     order for DME Equipment being ordered: Compression Stockings TWO (2) Pairs, 15-20 mm Hg. 2 Package prn     order for DME Equipment being ordered: bed pull up bar 1 Units 0     order for DME Equipment being ordered: shower chair 1 Device 0     pantoprazole (PROTONIX) 40 MG EC tablet Take 1 tablet (40 mg) by mouth 2 times daily 120 tablet 0     polyethylene glycol (MIRALAX) 17 g packet Take 17 g by mouth daily       propranolol (INDERAL) 20 MG tablet Take 1 tablet (20 mg) by mouth 3 times daily 180 tablet 3     senna-docusate (SENOKOT-S/PERICOLACE) 8.6-50 MG tablet Take 1-2 tablets by mouth daily as needed for constipation Take while on oral narcotics to prevent or treat constipation. 10 tablet 0     spironolactone (ALDACTONE) 50 MG tablet Take 50 mg by mouth daily       traZODone (DESYREL) 50 MG tablet Take 50 mg by mouth At Bedtime , May repeat  "x1 prn for ongoing insomnia       vitamin B1 (THIAMINE) 100 MG tablet Take 1 tablet (100 mg) by mouth daily       REVIEW OF SYSTEMS:  4 point ROS including Respiratory, CV, GI and , other than that noted in the HPI,  is negative    Objective:  /66   Pulse 66   Temp 97.9  F (36.6  C)   Resp 17   Ht 1.676 m (5' 6\")   Wt 74 kg (163 lb 3.2 oz)   SpO2 98%   BMI 26.34 kg/m    Exam:  GENERAL APPEARANCE:  Alert, in no acute distress   HEAD:  Normal, normocephalic, atraumatic  ENT:  Mouth and posterior oropharynx whitish, with some reddened spots as well, dry mucous membranes, hearing acuity - within normal limits   EYE EXAM:  EOM, conjunctivae, lids, pupils and irises normal   CHEST/RESP:  respiratory effort normal, no respiratory distress, lung sounds CTA   CV:  Rate and rhythm reg, no murmur/rub/gallop, no peripheral edema  M/S:   extremities normal, gait normal-walking with rolling walker short distances, fatigues easily, digits and nails within normal limits   NEUROLOGIC EXAM: Normal gross motor movement, tone and coordination. No tremor. Cranial nerves 2-12 are normal tested and grossly at patient's baseline  PSYCH:  Alert and oriented to self and surroundings with forgetfulness , affect pleasant      Labs:   Recent labs in [facility] reviewed by me today.     ASSESSMENT/PLAN:  Acute hypoxemic respiratory failure (H)  Chronic obstructive pulmonary disease, unspecified COPD type (H)  Mouth pain  Thrush  He has a history of COPD, on Dulera (mometasone/formoterol) as well as singulair.  He is not O2 dependent.  He is forgetful.  He has had some raspy voice, throat congestion, and whitish area in his mouth.  Unclear etiology for this, but certainly could be related to inhaled corticosteroid and be thrush  -nystatin swish and swallow for thrush  -monitor ongoing need for inhalers given no respiratory symptoms      transcribed by : Miranda Nguyễn  1. Nystatin oral swish and swallow; 100,000 " unit(s)/ml; give 5 ml QID x 7 days for swish and swallow - dx: thrush      Electronically signed by:  LOPEZ Campbell CNP

## 2020-07-11 ENCOUNTER — HOSPITAL LABORATORY (OUTPATIENT)
Facility: OTHER | Age: 60
End: 2020-07-11

## 2020-07-11 ENCOUNTER — TELEPHONE (OUTPATIENT)
Dept: GERIATRICS | Facility: CLINIC | Age: 60
End: 2020-07-11

## 2020-07-11 NOTE — TELEPHONE ENCOUNTER
[facility] TELEPHONE ENCOUNTER    Chief Complaint   Patient presents with     Blood Draw       Abhijeet Mccauley is a 59 year old  (1960),Nurse called today to report: Orders from earlier today per NP El to obtain STAT CBC/BMP and UA/UC due to increased confusion with 2 falls overnight wuth pink tinged urine along with a yellow white discharge from penis around catheter tubing. Nursing called to update provider that they were able to obtain UA/UC without concerns, however they attempted to obtain lab draw x 4 without success. Nursing is wondering if lab collection can be done on Monday 7/13/20. Per nursing, patient is stable at this time. No further falls or worsening concerns.     ASSESSMENT/PLAN      Move CBC and BMP lab collection to Monday 7/13/20    Electronically signed by:   LOPEZ Mc CNP

## 2020-07-11 NOTE — TELEPHONE ENCOUNTER
Staff page noting they cannot draw requested labs today - No answer with return call.    Dr. Toshia Gallegos APRN, DNP, A/GNP-[facility]  [address] [place] [address]     Cell: [phone]  Fax: [phone]  Email: [email or web address]

## 2020-07-11 NOTE — TELEPHONE ENCOUNTER
Patient with 2 falls overnight, along with increased confusion, pink-tinged urine along with a yellow-white discharge from penis around catheter tubing    ORDERS  -STAT UA/UC to r/o presence of UTI  -STAT CBC/BMP Dx. Falls/Confusion    Dr. Toshia Gallegos, APRN, DNP, A/GNP-[facility]  [address] [place] [address] [address]  [place] [address]     Cell: [phone]  Fax: [phone]  Email: [email or web address]

## 2020-07-12 ENCOUNTER — HOSPITAL LABORATORY (OUTPATIENT)
Facility: OTHER | Age: 60
End: 2020-07-12

## 2020-07-12 LAB
ALBUMIN UR-MCNC: NEGATIVE MG/DL
APPEARANCE UR: ABNORMAL
BILIRUB UR QL STRIP: NEGATIVE
COLOR UR AUTO: YELLOW
GLUCOSE UR STRIP-MCNC: NEGATIVE MG/DL
HGB UR QL STRIP: ABNORMAL
KETONES UR STRIP-MCNC: NEGATIVE MG/DL
LEUKOCYTE ESTERASE UR QL STRIP: ABNORMAL
NITRATE UR QL: NEGATIVE
PH UR STRIP: 6 PH (ref 5–7)
RBC #/AREA URNS AUTO: 28 /HPF (ref 0–2)
SOURCE: ABNORMAL
SP GR UR STRIP: 1.01 (ref 1–1.03)
SQUAMOUS #/AREA URNS AUTO: <1 /HPF (ref 0–1)
UROBILINOGEN UR STRIP-MCNC: 0 MG/DL (ref 0–2)
WBC #/AREA URNS AUTO: >182 /HPF (ref 0–5)
WBC CLUMPS #/AREA URNS HPF: PRESENT /HPF

## 2020-07-13 ENCOUNTER — HOSPITAL LABORATORY (OUTPATIENT)
Facility: OTHER | Age: 60
End: 2020-07-13

## 2020-07-13 ENCOUNTER — PRE VISIT (OUTPATIENT)
Dept: UROLOGY | Facility: CLINIC | Age: 60
End: 2020-07-13

## 2020-07-13 ENCOUNTER — NURSING HOME VISIT (OUTPATIENT)
Dept: GERIATRICS | Facility: CLINIC | Age: 60
End: 2020-07-13
Payer: COMMERCIAL

## 2020-07-13 VITALS
OXYGEN SATURATION: 100 % | WEIGHT: 153.2 LBS | RESPIRATION RATE: 20 BRPM | DIASTOLIC BLOOD PRESSURE: 53 MMHG | HEART RATE: 88 BPM | SYSTOLIC BLOOD PRESSURE: 104 MMHG | TEMPERATURE: 97.3 F | BODY MASS INDEX: 24.73 KG/M2

## 2020-07-13 DIAGNOSIS — J44.9 CHRONIC OBSTRUCTIVE PULMONARY DISEASE, UNSPECIFIED COPD TYPE (H): ICD-10-CM

## 2020-07-13 DIAGNOSIS — I85.11 SECONDARY ESOPHAGEAL VARICES WITH BLEEDING (H): ICD-10-CM

## 2020-07-13 DIAGNOSIS — N39.0 URINARY TRACT INFECTION WITHOUT HEMATURIA, SITE UNSPECIFIED: ICD-10-CM

## 2020-07-13 DIAGNOSIS — Z97.8 FOLEY CATHETER IN PLACE ON ADMISSION: ICD-10-CM

## 2020-07-13 DIAGNOSIS — J96.01 ACUTE HYPOXEMIC RESPIRATORY FAILURE (H): Primary | ICD-10-CM

## 2020-07-13 DIAGNOSIS — D50.0 IRON DEFICIENCY ANEMIA DUE TO CHRONIC BLOOD LOSS: ICD-10-CM

## 2020-07-13 DIAGNOSIS — N18.30 CKD (CHRONIC KIDNEY DISEASE) STAGE 3, GFR 30-59 ML/MIN (H): ICD-10-CM

## 2020-07-13 DIAGNOSIS — S31.20XD OPEN WOUND OF PENIS, SUBSEQUENT ENCOUNTER: ICD-10-CM

## 2020-07-13 LAB
ALBUMIN SERPL-MCNC: 2.7 G/DL (ref 3.4–5)
ALP SERPL-CCNC: 574 U/L (ref 40–150)
ALT SERPL W P-5'-P-CCNC: 34 U/L (ref 0–70)
ANION GAP SERPL CALCULATED.3IONS-SCNC: 6 MMOL/L (ref 3–14)
AST SERPL W P-5'-P-CCNC: 28 U/L (ref 0–45)
BACTERIA SPEC CULT: NORMAL
BASOPHILS # BLD AUTO: 0 10E9/L (ref 0–0.2)
BASOPHILS NFR BLD AUTO: 0.4 %
BILIRUB SERPL-MCNC: 1 MG/DL (ref 0.2–1.3)
BUN SERPL-MCNC: 22 MG/DL (ref 7–30)
CALCIUM SERPL-MCNC: 9.3 MG/DL (ref 8.5–10.1)
CHLORIDE SERPL-SCNC: 106 MMOL/L (ref 94–109)
CO2 SERPL-SCNC: 25 MMOL/L (ref 20–32)
CREAT SERPL-MCNC: 1.73 MG/DL (ref 0.66–1.25)
DIFFERENTIAL METHOD BLD: ABNORMAL
EOSINOPHIL # BLD AUTO: 0.4 10E9/L (ref 0–0.7)
EOSINOPHIL NFR BLD AUTO: 3.8 %
ERYTHROCYTE [DISTWIDTH] IN BLOOD BY AUTOMATED COUNT: 20.4 % (ref 10–15)
GFR SERPL CREATININE-BSD FRML MDRD: 42 ML/MIN/{1.73_M2}
GLUCOSE SERPL-MCNC: 51 MG/DL (ref 70–99)
HCT VFR BLD AUTO: 27.2 % (ref 40–53)
HGB BLD-MCNC: 8 G/DL (ref 13.3–17.7)
IMM GRANULOCYTES # BLD: 0.1 10E9/L (ref 0–0.4)
IMM GRANULOCYTES NFR BLD: 0.7 %
LYMPHOCYTES # BLD AUTO: 1.9 10E9/L (ref 0.8–5.3)
LYMPHOCYTES NFR BLD AUTO: 16.5 %
Lab: NORMAL
MCH RBC QN AUTO: 28.7 PG (ref 26.5–33)
MCHC RBC AUTO-ENTMCNC: 29.4 G/DL (ref 31.5–36.5)
MCV RBC AUTO: 98 FL (ref 78–100)
MONOCYTES # BLD AUTO: 1.6 10E9/L (ref 0–1.3)
MONOCYTES NFR BLD AUTO: 14.4 %
NEUTROPHILS # BLD AUTO: 7.3 10E9/L (ref 1.6–8.3)
NEUTROPHILS NFR BLD AUTO: 64.2 %
NRBC # BLD AUTO: 0 10*3/UL
NRBC BLD AUTO-RTO: 0 /100
PLATELET # BLD AUTO: 209 10E9/L (ref 150–450)
POTASSIUM SERPL-SCNC: 4.1 MMOL/L (ref 3.4–5.3)
PROT SERPL-MCNC: 6.8 G/DL (ref 6.8–8.8)
RBC # BLD AUTO: 2.79 10E12/L (ref 4.4–5.9)
SODIUM SERPL-SCNC: 137 MMOL/L (ref 133–144)
SPECIMEN SOURCE: NORMAL
WBC # BLD AUTO: 11.4 10E9/L (ref 4–11)

## 2020-07-13 PROCEDURE — 99309 SBSQ NF CARE MODERATE MDM 30: CPT | Performed by: NURSE PRACTITIONER

## 2020-07-13 NOTE — PROGRESS NOTES
[facility]  [place] Medical Record Number:  [id number]  Place of Service where encounter took place:  [facility] [[id number]]  Chief Complaint   Patient presents with     Nursing Home Acute       HPI:    Abhijeet Mccauley  is a 59 year old (1960), who is being seen today for an episodic care visit.  HPI information obtained from: facility chart records, facility staff, patient report and [facility] chart review. Today's concern is:     Acute hypoxemic respiratory failure (H)  Chronic obstructive pulmonary disease, unspecified COPD type (H)  CKD (chronic kidney disease) stage 3, GFR 30-59 ml/min (H)  Urinary tract infection without hematuria, site unspecified  Ga catheter in place on admission  Open wound of penis, subsequent encounter  Secondary esophageal varices with bleeding (H)  Iron deficiency anemia due to chronic blood loss     Abhijeet reports ongoing loss of voice, declining strength, minimal pain, no dyspnea, no cough.  Nursing reports he continues to be weaker and more confused.   He had several falls over the weekend-suffering abrasions and skin tears on both elbows, also noted to be more confused and hallucinating.   staff reports his mother passed away over the last few days.  On call provider ordered UA/C due to increased confusion with indwelling ga cath and started Bactrim DS for presumed UTI.  This morning, nursing reported he was so confused he tried to pull his catheter out.      Past Medical and Surgical History reviewed in Epic today.    MEDICATIONS:    Current Outpatient Medications   Medication Sig Dispense Refill     acetaminophen (TYLENOL) 325 MG tablet Take 2 tablets (650 mg) by mouth 3 times daily       albuterol (VENTOLIN HFA) 108 (90 Base) MCG/ACT inhaler Inhale 2 puffs into the lungs every 4 hours as needed for shortness of breath / dyspnea or wheezing 18 g 11     alum & mag hydroxide-simethicone (MAALOX  ES) 400-400-40 MG/5ML SUSP  suspension Take 30 mLs by mouth every 6 hours as needed for indigestion or heartburn       atorvastatin 20 MG PO tablet Take 1 tablet (20 mg) by mouth daily 90 tablet 3     buPROPion (WELLBUTRIN SR) 150 MG 12 hr tablet Take 150 mg by mouth 2 times daily       diclofenac (VOLTAREN) 1 % topical gel PLACE 2 GRAMS ONTO THE SKIN FOUR TIMES A DAY AS NEEDED FOR MODERATE PAIN 100 g 11     DULERA 200-5 MCG/ACT inhaler INHALE TWO PUFFS BY MOUTH TWICE A DAY 13 g 0     folic acid (FOLVITE) 1 MG tablet Take 1 tablet (1 mg) by mouth daily       furosemide (LASIX) 20 MG tablet Take 20 mg by mouth daily       gabapentin (NEURONTIN) 300 MG capsule Take 600 mg by mouth 2 times daily        Lidocaine (LIDOCARE) 4 % Patch Place 1 patch onto the skin every 24 hours To prevent lidocaine toxicity, patient should be patch free for 12 hrs daily.       melatonin 3 MG tablet Take 1 tablet (3 mg) by mouth nightly as needed for sleep       montelukast (SINGULAIR) 10 MG tablet Take 1 tablet (10 mg) by mouth At Bedtime 90 tablet 3     naltrexone (DEPADE/REVIA) 50 MG tablet Take 1 tablet (50 mg) by mouth daily       nicotine (NICODERM CQ) 14 MG/24HR 24 hr patch Place 1 patch onto the skin daily       nicotine 2 MG MT lozenge Place 1 lozenge (2 mg) inside cheek every hour as needed for smoking cessation 108 lozenge 11     nystatin (MYCOSTATIN) 631221 UNIT/ML suspension Take 5 mLs by mouth 4 times daily For 7 days only       ondansetron (ZOFRAN-ODT) 4 MG ODT tab Take 1 tablet (4 mg) by mouth every 12 hours as needed for nausea or vomiting       order for DME Equipment being ordered: 4 wheel walker 1 Units 0     order for DME Equipment being ordered: 2 pairs thigh high compression stockings, strength per patient preference 2 Units 1     order for DME Equipment being ordered: Compression Stockings TWO (2) Pairs, 15-20 mm Hg. 2 Package prn     order for DME Equipment being ordered: bed pull up bar 1 Units 0     order for DME Equipment being ordered:  shower chair 1 Device 0     pantoprazole (PROTONIX) 40 MG EC tablet Take 1 tablet (40 mg) by mouth 2 times daily 120 tablet 0     polyethylene glycol (MIRALAX) 17 g packet Take 17 g by mouth daily       propranolol (INDERAL) 20 MG tablet Take 1 tablet (20 mg) by mouth 3 times daily 180 tablet 3     senna-docusate (SENOKOT-S/PERICOLACE) 8.6-50 MG tablet Take 1-2 tablets by mouth daily as needed for constipation Take while on oral narcotics to prevent or treat constipation. 10 tablet 0     spironolactone (ALDACTONE) 50 MG tablet Take 50 mg by mouth daily       traZODone (DESYREL) 50 MG tablet Take 50 mg by mouth At Bedtime , May repeat x1 prn for ongoing insomnia       vitamin B1 (THIAMINE) 100 MG tablet Take 1 tablet (100 mg) by mouth daily         REVIEW OF SYSTEMS:  4 point ROS including Respiratory, CV, GI and , other than that noted in the HPI,  is negative    Objective:  /53   Pulse 88   Temp 97.3  F (36.3  C)   Resp 20   Wt 69.5 kg (153 lb 3.2 oz)   SpO2 100%   BMI 24.73 kg/m    Exam:  GENERAL APPEARANCE:  Alert, in no acute distress at rest  HEAD:  Normal, normocephalic, atraumatic  ENT:  Mouth and posterior oropharynx normal, moist mucous membranes without evidence of thrush, hearing acuity - within normal limits   EYE EXAM:  EOM, conjunctivae, lids, pupils and irises normal   CHEST/RESP:  respiratory effort normal, no respiratory distress, lung sounds diminished but clear   CV:  Rate and rhythm reg, no murmur/rub/gallop, no peripheral edema  M/S:   extremities normal, gait abnormal-weak, requiring staff assist to ambulate, digits and nails within normal limits   NEUROLOGIC EXAM: Normal gross motor movement, tone and coordination. No tremor. Cranial nerves 2-12 are normal tested and grossly at patient's baseline  PSYCH:  Alert and oriented to self and surroundings with forgetfulness , affect confused, sleepy     Labs:     Most Recent 3 CBC's:  Recent Labs   Lab Test 07/13/20  0617 07/06/20  0710  06/29/20  0715   WBC 11.4* 10.0 13.8*   HGB 8.0* 7.7* 8.4*   MCV 98 100 103*    258 383     Most Recent 3 BMP's:  Recent Labs   Lab Test 07/13/20  0617 07/06/20  0710 06/29/20  0715    136 135   POTASSIUM 4.1 4.5 4.1   CHLORIDE 106 105 109   CO2 25 27 18*   BUN 22 22 35*   CR 1.73* 1.52* 1.64*   ANIONGAP 6 4 8   BEE 9.3 8.9 9.1   GLC 51* 67* 75     Most Recent 2 LFT's:  Recent Labs   Lab Test 07/13/20  0617 06/29/20 0715   AST 28 69*   ALT 34 77*   ALKPHOS 574* 756*   BILITOTAL 1.0 1.1       ASSESSMENT/PLAN:  Acute hypoxemic respiratory failure (H)  Chronic obstructive pulmonary disease, unspecified COPD type (H)  Patient with unusual loss of voice over last 1-2 weeks, as if he does not have enough air to force out voice, unable to cough, lungs diminished but essentially clear.  He has not been smoking.  On dulera, singulair.  O2 sats stable. Mouth and throat clear, will discontinue nystatin.   -Chest Xray today  -hold dulera x 5 days  -discontinue nystatin    CKD (chronic kidney disease) stage 3, GFR 30-59 ml/min (H)  Baseline creat about 1.7 per review of records. On TCU admission, creat had improved to 1.49.  Now worsening back to baseline of 1.73 with eGFR 42.  Currently on furosemide and spironolactone for diuresis due to ascites  -Avoid nephrotoxic medications  -Renal dosing of medications  -decrease spironolactone, lasix was decreased last week  -monitor kidney function next week     Urinary tract infection without hematuria, site unspecified  Ga catheter in place on admission  Presumed UTI with chronic indwelling ga as well as the penis wound currently being treated.   -await UA/C results,  -continue bactrim until results of UA/C are noted    Alcoholic cirrhosis of the liver  Alcohol dependence  Patient with known alcohol dependence, on naltrexone for alcohol cessation.  He was originally started on lactulose but he absolutely refused to take it as he vomited after every dose.  He has  been having about 1 stool per day.  Unfortunately, we are unable to check an ammonia level at TCU due to needed handling of specimen.  Confusion waxes and wanes, this morning quite alert-yesterday VERY confused.  Nursing reports some hallucinations as well.    -consider ED evaluation or lab draw of ammonia level if confusion persists    Open wound of penis, subsequent encounter  This is actually improving with no further slough noted. Using Triad paste to wound BID. Stable     Secondary esophageal varices with bleeding (H)  Iron deficiency anemia due to chronic blood loss  Hemoglobin trending stable at 8 today, no obvious blood loss.      Orders written by provider at facility  1.  Discontinue naltrexone  2.  Decrease propranolol to 20 mg BID (no tremors noted)   3.  Decrease spironolactone to 25 mg daily   4.  Hold fluticasone/salmeterol x 5 days  5.  Decrease gabapentin to 300 mg BID  6.  Discontinue nystatin suspenstion  7.  Chest xray today stat due to loss of voice, hypoxia    Electronically signed by:  LOPEZ Campbell CNP

## 2020-07-13 NOTE — LETTER
7/13/2020        RE: Abhijeet Mccauley  [address] [place] [id number]        [facility]  [place] Medical Record Number:  [id number]  Place of Service where encounter took place[facility] [[id number]]  Chief Complaint   Patient presents with     Nursing Home Acute       HPI:    Abhijeet Mccauley  is a 59 year old (1960), who is being seen today for an episodic care visit.  HPI information obtained from: facility chart records, facility staff, patient report and [facility] chart review. Today's concern is:     Acute hypoxemic respiratory failure (H)  Chronic obstructive pulmonary disease, unspecified COPD type (H)  CKD (chronic kidney disease) stage 3, GFR 30-59 ml/min (H)  Urinary tract infection without hematuria, site unspecified  Ga catheter in place on admission  Open wound of penis, subsequent encounter  Secondary esophageal varices with bleeding (H)  Iron deficiency anemia due to chronic blood loss     Abhijeet reports ongoing loss of voice, declining strength, minimal pain, no dyspnea, no cough.  Nursing reports he continues to be weaker and more confused.   He had several falls over the weekend-suffering abrasions and skin tears on both elbows, also noted to be more confused and hallucinating.   staff reports his mother passed away over the last few days.  On call provider ordered UA/C due to increased confusion with indwelling ga cath and started Bactrim DS for presumed UTI.  This morning, nursing reported he was so confused he tried to pull his catheter out.      Past Medical and Surgical History reviewed in Epic today.    MEDICATIONS:    Current Outpatient Medications   Medication Sig Dispense Refill     acetaminophen (TYLENOL) 325 MG tablet Take 2 tablets (650 mg) by mouth 3 times daily       albuterol (VENTOLIN HFA) 108 (90 Base) MCG/ACT inhaler Inhale 2 puffs into the lungs every 4 hours as needed for shortness of breath / dyspnea or  Pt called and left a message stating that she has questions regarding her medication.   I called pt back but no answer, left vm to call back.    wheezing 18 g 11     alum & mag hydroxide-simethicone (MAALOX  ES) 400-400-40 MG/5ML SUSP suspension Take 30 mLs by mouth every 6 hours as needed for indigestion or heartburn       atorvastatin 20 MG PO tablet Take 1 tablet (20 mg) by mouth daily 90 tablet 3     buPROPion (WELLBUTRIN SR) 150 MG 12 hr tablet Take 150 mg by mouth 2 times daily       diclofenac (VOLTAREN) 1 % topical gel PLACE 2 GRAMS ONTO THE SKIN FOUR TIMES A DAY AS NEEDED FOR MODERATE PAIN 100 g 11     DULERA 200-5 MCG/ACT inhaler INHALE TWO PUFFS BY MOUTH TWICE A DAY 13 g 0     folic acid (FOLVITE) 1 MG tablet Take 1 tablet (1 mg) by mouth daily       furosemide (LASIX) 20 MG tablet Take 20 mg by mouth daily       gabapentin (NEURONTIN) 300 MG capsule Take 600 mg by mouth 2 times daily        Lidocaine (LIDOCARE) 4 % Patch Place 1 patch onto the skin every 24 hours To prevent lidocaine toxicity, patient should be patch free for 12 hrs daily.       melatonin 3 MG tablet Take 1 tablet (3 mg) by mouth nightly as needed for sleep       montelukast (SINGULAIR) 10 MG tablet Take 1 tablet (10 mg) by mouth At Bedtime 90 tablet 3     naltrexone (DEPADE/REVIA) 50 MG tablet Take 1 tablet (50 mg) by mouth daily       nicotine (NICODERM CQ) 14 MG/24HR 24 hr patch Place 1 patch onto the skin daily       nicotine 2 MG MT lozenge Place 1 lozenge (2 mg) inside cheek every hour as needed for smoking cessation 108 lozenge 11     nystatin (MYCOSTATIN) 825818 UNIT/ML suspension Take 5 mLs by mouth 4 times daily For 7 days only       ondansetron (ZOFRAN-ODT) 4 MG ODT tab Take 1 tablet (4 mg) by mouth every 12 hours as needed for nausea or vomiting       order for DME Equipment being ordered: 4 wheel walker 1 Units 0     order for DME Equipment being ordered: 2 pairs thigh high compression stockings, strength per patient preference 2 Units 1     order for DME Equipment being ordered: Compression Stockings TWO (2) Pairs, 15-20 mm Hg. 2 Package prn     order for DME  Equipment being ordered: bed pull up bar 1 Units 0     order for DME Equipment being ordered: shower chair 1 Device 0     pantoprazole (PROTONIX) 40 MG EC tablet Take 1 tablet (40 mg) by mouth 2 times daily 120 tablet 0     polyethylene glycol (MIRALAX) 17 g packet Take 17 g by mouth daily       propranolol (INDERAL) 20 MG tablet Take 1 tablet (20 mg) by mouth 3 times daily 180 tablet 3     senna-docusate (SENOKOT-S/PERICOLACE) 8.6-50 MG tablet Take 1-2 tablets by mouth daily as needed for constipation Take while on oral narcotics to prevent or treat constipation. 10 tablet 0     spironolactone (ALDACTONE) 50 MG tablet Take 50 mg by mouth daily       traZODone (DESYREL) 50 MG tablet Take 50 mg by mouth At Bedtime , May repeat x1 prn for ongoing insomnia       vitamin B1 (THIAMINE) 100 MG tablet Take 1 tablet (100 mg) by mouth daily         REVIEW OF SYSTEMS:  4 point ROS including Respiratory, CV, GI and , other than that noted in the HPI,  is negative    Objective:  /53   Pulse 88   Temp 97.3  F (36.3  C)   Resp 20   Wt 69.5 kg (153 lb 3.2 oz)   SpO2 100%   BMI 24.73 kg/m    Exam:  GENERAL APPEARANCE:  Alert, in no acute distress at rest  HEAD:  Normal, normocephalic, atraumatic  ENT:  Mouth and posterior oropharynx normal, moist mucous membranes without evidence of thrush, hearing acuity - within normal limits   EYE EXAM:  EOM, conjunctivae, lids, pupils and irises normal   CHEST/RESP:  respiratory effort normal, no respiratory distress, lung sounds diminished but clear   CV:  Rate and rhythm reg, no murmur/rub/gallop, no peripheral edema  M/S:   extremities normal, gait abnormal-weak, requiring staff assist to ambulate, digits and nails within normal limits   NEUROLOGIC EXAM: Normal gross motor movement, tone and coordination. No tremor. Cranial nerves 2-12 are normal tested and grossly at patient's baseline  PSYCH:  Alert and oriented to self and surroundings with forgetfulness , affect confused,  sleepy     Labs:     Most Recent 3 CBC's:  Recent Labs   Lab Test 07/13/20  0617 07/06/20  0710 06/29/20  0715   WBC 11.4* 10.0 13.8*   HGB 8.0* 7.7* 8.4*   MCV 98 100 103*    258 383     Most Recent 3 BMP's:  Recent Labs   Lab Test 07/13/20  0617 07/06/20  0710 06/29/20  0715    136 135   POTASSIUM 4.1 4.5 4.1   CHLORIDE 106 105 109   CO2 25 27 18*   BUN 22 22 35*   CR 1.73* 1.52* 1.64*   ANIONGAP 6 4 8   BEE 9.3 8.9 9.1   GLC 51* 67* 75     Most Recent 2 LFT's:  Recent Labs   Lab Test 07/13/20 0617 06/29/20 0715   AST 28 69*   ALT 34 77*   ALKPHOS 574* 756*   BILITOTAL 1.0 1.1       ASSESSMENT/PLAN:  Acute hypoxemic respiratory failure (H)  Chronic obstructive pulmonary disease, unspecified COPD type (H)  Patient with unusual loss of voice over last 1-2 weeks, as if he does not have enough air to force out voice, unable to cough, lungs diminished but essentially clear.  He has not been smoking.  On dulera, singulair.  O2 sats stable. Mouth and throat clear, will discontinue nystatin.   -Chest Xray today  -hold dulera x 5 days  -discontinue nystatin    CKD (chronic kidney disease) stage 3, GFR 30-59 ml/min (H)  Baseline creat about 1.7 per review of records. On TCU admission, creat had improved to 1.49.  Now worsening back to baseline of 1.73 with eGFR 42.  Currently on furosemide and spironolactone for diuresis due to ascites  -Avoid nephrotoxic medications  -Renal dosing of medications  -decrease spironolactone, lasix was decreased last week  -monitor kidney function next week     Urinary tract infection without hematuria, site unspecified  Ga catheter in place on admission  Presumed UTI with chronic indwelling ga as well as the penis wound currently being treated.   -await UA/C results,  -continue bactrim until results of UA/C are noted    Alcoholic cirrhosis of the liver  Alcohol dependence  Patient with known alcohol dependence, on naltrexone for alcohol cessation.  He was originally  started on lactulose but he absolutely refused to take it as he vomited after every dose.  He has been having about 1 stool per day.  Unfortunately, we are unable to check an ammonia level at TCU due to needed handling of specimen.  Confusion waxes and wanes, this morning quite alert-yesterday VERY confused.  Nursing reports some hallucinations as well.    -consider ED evaluation or lab draw of ammonia level if confusion persists    Open wound of penis, subsequent encounter  This is actually improving with no further slough noted. Using Triad paste to wound BID. Stable     Secondary esophageal varices with bleeding (H)  Iron deficiency anemia due to chronic blood loss  Hemoglobin trending stable at 8 today, no obvious blood loss.      Orders written by provider at facility  1.  Discontinue naltrexone  2.  Decrease propranolol to 20 mg BID (no tremors noted)   3.  Decrease spironolactone to 25 mg daily   4.  Hold fluticasone/salmeterol x 5 days  5.  Decrease gabapentin to 300 mg BID  6.  Discontinue nystatin suspenstion  7.  Chest xray today stat due to loss of voice, hypoxia    Electronically signed by:  LOPEZ Campbell CNP               Sincerely,        LOPEZ Campbell CNP

## 2020-07-14 ASSESSMENT — MIFFLIN-ST. JEOR: SCORE: 1488.02

## 2020-07-15 ENCOUNTER — HOSPITAL ENCOUNTER (OUTPATIENT)
Dept: LAB | Facility: CLINIC | Age: 60
Discharge: HOME | End: 2020-07-15
Attending: NURSE PRACTITIONER | Admitting: NURSE PRACTITIONER
Payer: COMMERCIAL

## 2020-07-15 ENCOUNTER — NURSING HOME VISIT (OUTPATIENT)
Dept: GERIATRICS | Facility: CLINIC | Age: 60
End: 2020-07-15
Payer: COMMERCIAL

## 2020-07-15 VITALS
TEMPERATURE: 97.9 F | WEIGHT: 163.2 LBS | BODY MASS INDEX: 26.23 KG/M2 | RESPIRATION RATE: 16 BRPM | OXYGEN SATURATION: 97 % | DIASTOLIC BLOOD PRESSURE: 72 MMHG | HEIGHT: 66 IN | HEART RATE: 70 BPM | SYSTOLIC BLOOD PRESSURE: 95 MMHG

## 2020-07-15 DIAGNOSIS — R29.6 RECURRENT FALLS: ICD-10-CM

## 2020-07-15 DIAGNOSIS — R41.0 CONFUSION: ICD-10-CM

## 2020-07-15 DIAGNOSIS — K70.31 ALCOHOLIC CIRRHOSIS OF LIVER WITH ASCITES (H): Primary | ICD-10-CM

## 2020-07-15 DIAGNOSIS — K76.82 HEPATIC ENCEPHALOPATHY (H): ICD-10-CM

## 2020-07-15 DIAGNOSIS — D64.9 NORMOCYTIC ANEMIA: ICD-10-CM

## 2020-07-15 LAB — AMMONIA PLAS-SCNC: 101 UMOL/L (ref 10–50)

## 2020-07-15 PROCEDURE — 99310 SBSQ NF CARE HIGH MDM 45: CPT | Performed by: NURSE PRACTITIONER

## 2020-07-15 PROCEDURE — 82140 ASSAY OF AMMONIA: CPT | Performed by: NURSE PRACTITIONER

## 2020-07-15 RX ORDER — PROPRANOLOL HYDROCHLORIDE 20 MG/1
20 TABLET ORAL 2 TIMES DAILY
Qty: 180 TABLET | Refills: 3 | Status: ON HOLD
Start: 2020-07-15 | End: 2020-08-29

## 2020-07-15 RX ORDER — BUPROPION HYDROCHLORIDE 75 MG/1
75 TABLET ORAL DAILY
Status: ON HOLD | COMMUNITY
Start: 2020-07-15 | End: 2020-08-29

## 2020-07-15 NOTE — LETTER
"    7/15/2020        RE: Abhijeet Mccauley  [address] [place] [id number]        [facility]  [place] Medical Record Number: [id number]  Place of Service where encounter took place[facility] [[id number]]  Chief Complaint   Patient presents with     RECHECK       HPI: Abhijeet Mccauley is a 59 year old (1960), who is being seen today for an episodic care visit. HPI information obtained from: facility chart records, facility staff, patient report and [facility] chart review. Today's concern is:     Alcoholic cirrhosis, unspecified whether ascites present (H)  Alcoholic cirrhosis of liver with ascites (H)  Confusion  Hepatic encephalopathy (H)  Recurrent falls     Abhijeet reports no new concerns, he continues to be confused and weaker.  Nursing reports increased weakness, found on floor on 7/11, unsteady on his feet.  He continues to have \"hoarse voice\" but is able to verbalize with direction at normal level.       Past Medical and Surgical History reviewed in Epic today.    MEDICATIONS:    Current Outpatient Medications   Medication Sig Dispense Refill     acetaminophen (TYLENOL) 325 MG tablet Take 2 tablets (650 mg) by mouth 3 times daily       albuterol (VENTOLIN HFA) 108 (90 Base) MCG/ACT inhaler Inhale 2 puffs into the lungs every 4 hours as needed for shortness of breath / dyspnea or wheezing 18 g 11     alum & mag hydroxide-simethicone (MAALOX  ES) 400-400-40 MG/5ML SUSP suspension Take 30 mLs by mouth every 6 hours as needed for indigestion or heartburn       atorvastatin 20 MG PO tablet Take 1 tablet (20 mg) by mouth daily 90 tablet 3     buPROPion (WELLBUTRIN) 75 MG tablet Take 75 mg by mouth daily **NOT EXTENDED RELEASE       diclofenac (VOLTAREN) 1 % topical gel PLACE 2 GRAMS ONTO THE SKIN FOUR TIMES A DAY AS NEEDED FOR MODERATE PAIN 100 g 11     DULERA 200-5 MCG/ACT inhaler INHALE TWO PUFFS BY MOUTH TWICE A DAY 13 g 0     folic acid (FOLVITE) 1 MG tablet " Take 1 tablet (1 mg) by mouth daily       furosemide (LASIX) 20 MG tablet Take 20 mg by mouth daily       gabapentin (NEURONTIN) 300 MG capsule Take 300 mg by mouth 2 times daily       Lidocaine (LIDOCARE) 4 % Patch Place 1 patch onto the skin every 24 hours To prevent lidocaine toxicity, patient should be patch free for 12 hrs daily.       melatonin 3 MG tablet Take 1 tablet (3 mg) by mouth nightly as needed for sleep       montelukast (SINGULAIR) 10 MG tablet Take 1 tablet (10 mg) by mouth At Bedtime 90 tablet 3     nicotine (NICODERM CQ) 14 MG/24HR 24 hr patch Place 1 patch onto the skin daily       nicotine 2 MG MT lozenge Place 1 lozenge (2 mg) inside cheek every hour as needed for smoking cessation 108 lozenge 11     ondansetron (ZOFRAN-ODT) 4 MG ODT tab Take 1 tablet (4 mg) by mouth every 12 hours as needed for nausea or vomiting       pantoprazole (PROTONIX) 40 MG EC tablet Take 1 tablet (40 mg) by mouth 2 times daily 120 tablet 0     polyethylene glycol (MIRALAX) 17 g packet Take 17 g by mouth daily       propranolol (INDERAL) 20 MG tablet Take 1 tablet (20 mg) by mouth 2 times daily 180 tablet 3     senna-docusate (SENOKOT-S/PERICOLACE) 8.6-50 MG tablet Take 1-2 tablets by mouth daily as needed for constipation Take while on oral narcotics to prevent or treat constipation. 10 tablet 0     spironolactone (ALDACTONE) 50 MG tablet Take 25 mg by mouth daily       vitamin B1 (THIAMINE) 100 MG tablet Take 1 tablet (100 mg) by mouth daily       order for DME Equipment being ordered: 4 wheel walker 1 Units 0     order for DME Equipment being ordered: 2 pairs thigh high compression stockings, strength per patient preference 2 Units 1     order for DME Equipment being ordered: Compression Stockings TWO (2) Pairs, 15-20 mm Hg. 2 Package prn     order for DME Equipment being ordered: bed pull up bar 1 Units 0     order for DME Equipment being ordered: shower chair 1 Device 0     REVIEW OF SYSTEMS:  4 point ROS  "including Respiratory, CV, GI and , other than that noted in the HPI,  is negative    Objective:  BP 95/72   Pulse 70   Temp 97.9  F (36.6  C)   Resp 16   Ht 1.676 m (5' 6\")   Wt 74 kg (163 lb 3.2 oz)   SpO2 97%   BMI 26.34 kg/m    Exam:  GENERAL APPEARANCE:  Alert, in no acute distress, confused, weak, unsteady   HEAD:  Normal, normocephalic, atraumatic  ENT:  Mouth and posterior oropharynx normal, moist mucous membranes, hearing acuity - within normal limits   EYE EXAM:  EOM, conjunctivae, lids, pupils and irises normal   CHEST/RESP:  respiratory effort normal, no respiratory distress, lung sounds CTA    CV:  Rate and rhythm reg, no murmur/rub/gallop, no peripheral edema  ABD:  Slightly distended, firm, no notable fluid shift  M/S:   extremities normal, gait abnormal-shuffling gait and leans back, digits and nails within normal limits   SKIN:  dusky  NEUROLOGIC EXAM: Normal gross motor movement, tone and coordination. No tremor. Cranial nerves 2-12 are normal tested and grossly at patient's baseline  PSYCH:  Alert and oriented to self and surroundings, affect pleasant      Labs:     Most Recent 3 CBC's:  Recent Labs   Lab Test 07/13/20  0617 07/06/20  0710 06/29/20  0715   WBC 11.4* 10.0 13.8*   HGB 8.0* 7.7* 8.4*   MCV 98 100 103*    258 383     Most Recent 3 BMP's:  Recent Labs   Lab Test 07/13/20  0617 07/06/20  0710 06/29/20  0715    136 135   POTASSIUM 4.1 4.5 4.1   CHLORIDE 106 105 109   CO2 25 27 18*   BUN 22 22 35*   CR 1.73* 1.52* 1.64*   ANIONGAP 6 4 8   BEE 9.3 8.9 9.1   GLC 51* 67* 75     Most Recent 2 LFT's:  Recent Labs   Lab Test 07/13/20 0617 06/29/20  0715   AST 28 69*   ALT 34 77*   ALKPHOS 574* 756*   BILITOTAL 1.0 1.1       Ammonia level 101    ASSESSMENT/PLAN:  Alcoholic cirrhosis of liver with ascites (H)  Confusion  Hepatic encephalopathy (H)  Recurrent falls  Patient with known alcohol cirrhosis, has not tolerated lactulose in the past-actually refused to take it and " vomited after use. He has not been taking it for about 2 weeks and has been gradually worsening in mentation and ambulation.  Unfortunately, we are unable to check ammonia level easily in the skilled nursing facility, due to the special handling requirements of the specimen.  Patient with significant change of condition over last days, high risk for rehospitalization and concerns with significant multiple medical comorbid conditions makes his condition significantly worse.    Sent to [facility] lab today to have ammonia level checked urgently, and came back as significantly elevated-which is likely cause of increased confusion, weakness, falling.     In addition, will further decrease medications that could be contributory to confusion and weakness.  In particular, bupropion should be significantly decreased in people with hepatic dysfunction. Will also discontinue scheduled trazodone for now due to confusion and encephalopathy-has prn available.       transcribed by : Miranda Nguyễn  1. Discontinue scheduled trazodone 50 mg at bedtime  2. Discontinue bupropion  mg BID   3. Bupropion 75 mg (NOT ER) every day po - dx: depression  4. Labs 7/20/20    CMP - dx: liver cirrhosis    CBC - dx: anemia  5. Please schedule a ride/appt for an ammonia level at [facility] (order placed in epic)  6. Abdominal ultrasound to determine the presence of ascites - dx: liver cirrhosis   7.  Lactulose 30 ml TID po for hyperammonemia  8.  Rifaximin 550 mg BID po for hyperammonemia        Electronically signed by:  LOPEZ Campbell CNP         Sincerely,        LOPEZ Campbell CNP

## 2020-07-15 NOTE — PROGRESS NOTES
"[facility]  [place] Medical Record Number: [id number]  Place of Service where encounter took place[facility] [[id number]]  Chief Complaint   Patient presents with     RECHECK       HPI: Abhijeet Mccauley is a 59 year old (1960), who is being seen today for an episodic care visit. HPI information obtained from: facility chart records, facility staff, patient report and [facility] chart review. Today's concern is:     Alcoholic cirrhosis, unspecified whether ascites present (H)  Alcoholic cirrhosis of liver with ascites (H)  Confusion  Hepatic encephalopathy (H)  Recurrent falls     Abhijeet reports no new concerns, he continues to be confused and weaker.  Nursing reports increased weakness, found on floor on 7/11, unsteady on his feet.  He continues to have \"hoarse voice\" but is able to verbalize with direction at normal level.       Past Medical and Surgical History reviewed in Epic today.    MEDICATIONS:    Current Outpatient Medications   Medication Sig Dispense Refill     acetaminophen (TYLENOL) 325 MG tablet Take 2 tablets (650 mg) by mouth 3 times daily       albuterol (VENTOLIN HFA) 108 (90 Base) MCG/ACT inhaler Inhale 2 puffs into the lungs every 4 hours as needed for shortness of breath / dyspnea or wheezing 18 g 11     alum & mag hydroxide-simethicone (MAALOX  ES) 400-400-40 MG/5ML SUSP suspension Take 30 mLs by mouth every 6 hours as needed for indigestion or heartburn       atorvastatin 20 MG PO tablet Take 1 tablet (20 mg) by mouth daily 90 tablet 3     buPROPion (WELLBUTRIN) 75 MG tablet Take 75 mg by mouth daily **NOT EXTENDED RELEASE       diclofenac (VOLTAREN) 1 % topical gel PLACE 2 GRAMS ONTO THE SKIN FOUR TIMES A DAY AS NEEDED FOR MODERATE PAIN 100 g 11     DULERA 200-5 MCG/ACT inhaler INHALE TWO PUFFS BY MOUTH TWICE A DAY 13 g 0     folic acid (FOLVITE) 1 MG tablet Take 1 tablet (1 mg) by mouth daily       furosemide (LASIX) 20 MG tablet Take 20 mg " by mouth daily       gabapentin (NEURONTIN) 300 MG capsule Take 300 mg by mouth 2 times daily       Lidocaine (LIDOCARE) 4 % Patch Place 1 patch onto the skin every 24 hours To prevent lidocaine toxicity, patient should be patch free for 12 hrs daily.       melatonin 3 MG tablet Take 1 tablet (3 mg) by mouth nightly as needed for sleep       montelukast (SINGULAIR) 10 MG tablet Take 1 tablet (10 mg) by mouth At Bedtime 90 tablet 3     nicotine (NICODERM CQ) 14 MG/24HR 24 hr patch Place 1 patch onto the skin daily       nicotine 2 MG MT lozenge Place 1 lozenge (2 mg) inside cheek every hour as needed for smoking cessation 108 lozenge 11     ondansetron (ZOFRAN-ODT) 4 MG ODT tab Take 1 tablet (4 mg) by mouth every 12 hours as needed for nausea or vomiting       pantoprazole (PROTONIX) 40 MG EC tablet Take 1 tablet (40 mg) by mouth 2 times daily 120 tablet 0     polyethylene glycol (MIRALAX) 17 g packet Take 17 g by mouth daily       propranolol (INDERAL) 20 MG tablet Take 1 tablet (20 mg) by mouth 2 times daily 180 tablet 3     senna-docusate (SENOKOT-S/PERICOLACE) 8.6-50 MG tablet Take 1-2 tablets by mouth daily as needed for constipation Take while on oral narcotics to prevent or treat constipation. 10 tablet 0     spironolactone (ALDACTONE) 50 MG tablet Take 25 mg by mouth daily       vitamin B1 (THIAMINE) 100 MG tablet Take 1 tablet (100 mg) by mouth daily       order for DME Equipment being ordered: 4 wheel walker 1 Units 0     order for DME Equipment being ordered: 2 pairs thigh high compression stockings, strength per patient preference 2 Units 1     order for DME Equipment being ordered: Compression Stockings TWO (2) Pairs, 15-20 mm Hg. 2 Package prn     order for DME Equipment being ordered: bed pull up bar 1 Units 0     order for DME Equipment being ordered: shower chair 1 Device 0     REVIEW OF SYSTEMS:  4 point ROS including Respiratory, CV, GI and , other than that noted in the HPI,  is  "negative    Objective:  BP 95/72   Pulse 70   Temp 97.9  F (36.6  C)   Resp 16   Ht 1.676 m (5' 6\")   Wt 74 kg (163 lb 3.2 oz)   SpO2 97%   BMI 26.34 kg/m    Exam:  GENERAL APPEARANCE:  Alert, in no acute distress, confused, weak, unsteady   HEAD:  Normal, normocephalic, atraumatic  ENT:  Mouth and posterior oropharynx normal, moist mucous membranes, hearing acuity - within normal limits   EYE EXAM:  EOM, conjunctivae, lids, pupils and irises normal   CHEST/RESP:  respiratory effort normal, no respiratory distress, lung sounds CTA    CV:  Rate and rhythm reg, no murmur/rub/gallop, no peripheral edema  ABD:  Slightly distended, firm, no notable fluid shift  M/S:   extremities normal, gait abnormal-shuffling gait and leans back, digits and nails within normal limits   SKIN:  dusky  NEUROLOGIC EXAM: Normal gross motor movement, tone and coordination. No tremor. Cranial nerves 2-12 are normal tested and grossly at patient's baseline  PSYCH:  Alert and oriented to self and surroundings, affect pleasant      Labs:     Most Recent 3 CBC's:  Recent Labs   Lab Test 07/13/20  0617 07/06/20  0710 06/29/20  0715   WBC 11.4* 10.0 13.8*   HGB 8.0* 7.7* 8.4*   MCV 98 100 103*    258 383     Most Recent 3 BMP's:  Recent Labs   Lab Test 07/13/20  0617 07/06/20  0710 06/29/20  0715    136 135   POTASSIUM 4.1 4.5 4.1   CHLORIDE 106 105 109   CO2 25 27 18*   BUN 22 22 35*   CR 1.73* 1.52* 1.64*   ANIONGAP 6 4 8   BEE 9.3 8.9 9.1   GLC 51* 67* 75     Most Recent 2 LFT's:  Recent Labs   Lab Test 07/13/20  0617 06/29/20  0715   AST 28 69*   ALT 34 77*   ALKPHOS 574* 756*   BILITOTAL 1.0 1.1       Ammonia level 101    ASSESSMENT/PLAN:  Alcoholic cirrhosis of liver with ascites (H)  Confusion  Hepatic encephalopathy (H)  Recurrent falls  Patient with known alcohol cirrhosis, has not tolerated lactulose in the past-actually refused to take it and vomited after use. He has not been taking it for about 2 weeks and has been " gradually worsening in mentation and ambulation.  Unfortunately, we are unable to check ammonia level easily in the skilled nursing facility, due to the special handling requirements of the specimen.  Patient with significant change of condition over last days, high risk for rehospitalization and concerns with significant multiple medical comorbid conditions makes his condition significantly worse.    Sent to [facility] lab today to have ammonia level checked urgently, and came back as significantly elevated-which is likely cause of increased confusion, weakness, falling.     In addition, will further decrease medications that could be contributory to confusion and weakness.  In particular, bupropion should be significantly decreased in people with hepatic dysfunction. Will also discontinue scheduled trazodone for now due to confusion and encephalopathy-has prn available.       transcribed by : Miranda Nguyễn  1. Discontinue scheduled trazodone 50 mg at bedtime  2. Discontinue bupropion  mg BID   3. Bupropion 75 mg (NOT ER) every day po - dx: depression  4. Labs 7/20/20    CMP - dx: liver cirrhosis    CBC - dx: anemia  5. Please schedule a ride/appt for an ammonia level at [facility] (order placed in epic)  6. Abdominal ultrasound to determine the presence of ascites - dx: liver cirrhosis   7.  Lactulose 30 ml TID po for hyperammonemia  8.  Rifaximin 550 mg BID po for hyperammonemia        Electronically signed by:  LOPEZ Campbell CNP

## 2020-07-16 ASSESSMENT — MIFFLIN-ST. JEOR: SCORE: 1494.82

## 2020-07-17 ENCOUNTER — NURSING HOME VISIT (OUTPATIENT)
Dept: GERIATRICS | Facility: CLINIC | Age: 60
End: 2020-07-17
Payer: COMMERCIAL

## 2020-07-17 ENCOUNTER — HOSPITAL ENCOUNTER (INPATIENT)
Facility: CLINIC | Age: 60
LOS: 4 days | Discharge: NURSING, REHAB OR LONG-TERM CARE | End: 2020-07-21
Attending: HOSPITALIST | Admitting: STUDENT IN AN ORGANIZED HEALTH CARE EDUCATION/TRAINING PROGRAM
Payer: COMMERCIAL

## 2020-07-17 ENCOUNTER — HOSPITAL ENCOUNTER (EMERGENCY)
Facility: CLINIC | Age: 60
Discharge: ANOTHER HOSPITAL | End: 2020-07-17
Attending: EMERGENCY MEDICINE | Admitting: EMERGENCY MEDICINE
Payer: COMMERCIAL

## 2020-07-17 ENCOUNTER — APPOINTMENT (OUTPATIENT)
Dept: CT IMAGING | Facility: CLINIC | Age: 60
End: 2020-07-17
Attending: EMERGENCY MEDICINE
Payer: COMMERCIAL

## 2020-07-17 VITALS
HEIGHT: 66 IN | SYSTOLIC BLOOD PRESSURE: 117 MMHG | OXYGEN SATURATION: 99 % | BODY MASS INDEX: 26.47 KG/M2 | RESPIRATION RATE: 18 BRPM | DIASTOLIC BLOOD PRESSURE: 71 MMHG | WEIGHT: 164.7 LBS | HEART RATE: 66 BPM | TEMPERATURE: 98.4 F

## 2020-07-17 VITALS
OXYGEN SATURATION: 99 % | TEMPERATURE: 97.9 F | RESPIRATION RATE: 33 BRPM | HEART RATE: 64 BPM | DIASTOLIC BLOOD PRESSURE: 62 MMHG | SYSTOLIC BLOOD PRESSURE: 128 MMHG

## 2020-07-17 DIAGNOSIS — G93.41 ACUTE METABOLIC ENCEPHALOPATHY: ICD-10-CM

## 2020-07-17 DIAGNOSIS — S22.41XA CLOSED FRACTURE OF MULTIPLE RIBS OF RIGHT SIDE, INITIAL ENCOUNTER: ICD-10-CM

## 2020-07-17 DIAGNOSIS — N39.0 URINARY TRACT INFECTION WITHOUT HEMATURIA, SITE UNSPECIFIED: Primary | ICD-10-CM

## 2020-07-17 DIAGNOSIS — R41.0 CONFUSION: ICD-10-CM

## 2020-07-17 DIAGNOSIS — K76.82 HEPATIC ENCEPHALOPATHY (H): ICD-10-CM

## 2020-07-17 DIAGNOSIS — K70.30 ALCOHOLIC CIRRHOSIS, UNSPECIFIED WHETHER ASCITES PRESENT (H): ICD-10-CM

## 2020-07-17 DIAGNOSIS — S22.49XD CLOSED FRACTURE OF MULTIPLE RIBS WITH ROUTINE HEALING, UNSPECIFIED LATERALITY, SUBSEQUENT ENCOUNTER: ICD-10-CM

## 2020-07-17 DIAGNOSIS — K76.82 HEPATIC ENCEPHALOPATHY (H): Primary | ICD-10-CM

## 2020-07-17 DIAGNOSIS — R29.6 RECURRENT FALLS: ICD-10-CM

## 2020-07-17 PROBLEM — D72.821 MONOCYTOSIS: Status: RESOLVED | Noted: 2018-05-17 | Resolved: 2020-07-17
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
## 2020-07-17 PROBLEM — K92.2 GI BLEED: Status: RESOLVED | Noted: 2020-02-27 | Resolved: 2020-07-17

## 2020-07-17 LAB
ALBUMIN SERPL-MCNC: 2.9 G/DL (ref 3.4–5)
ALBUMIN UR-MCNC: NEGATIVE MG/DL
ALP SERPL-CCNC: 507 U/L (ref 40–150)
ALT SERPL W P-5'-P-CCNC: 28 U/L (ref 0–70)
AMMONIA PLAS-SCNC: 34 UMOL/L (ref 10–50)
ANION GAP SERPL CALCULATED.3IONS-SCNC: 6 MMOL/L (ref 3–14)
APPEARANCE UR: ABNORMAL
APTT PPP: 30 SEC (ref 22–37)
AST SERPL W P-5'-P-CCNC: 35 U/L (ref 0–45)
BACTERIA #/AREA URNS HPF: ABNORMAL /HPF
BASE DEFICIT BLDV-SCNC: 5.1 MMOL/L
BASOPHILS # BLD AUTO: 0.1 10E9/L (ref 0–0.2)
BASOPHILS NFR BLD AUTO: 0.5 %
BILIRUB SERPL-MCNC: 0.8 MG/DL (ref 0.2–1.3)
BILIRUB UR QL STRIP: NEGATIVE
BUN SERPL-MCNC: 21 MG/DL (ref 7–30)
CALCIUM SERPL-MCNC: 9.4 MG/DL (ref 8.5–10.1)
CHLORIDE SERPL-SCNC: 110 MMOL/L (ref 94–109)
CO2 SERPL-SCNC: 23 MMOL/L (ref 20–32)
COLOR UR AUTO: YELLOW
CREAT SERPL-MCNC: 1.92 MG/DL (ref 0.66–1.25)
DIFFERENTIAL METHOD BLD: ABNORMAL
EOSINOPHIL # BLD AUTO: 0.2 10E9/L (ref 0–0.7)
EOSINOPHIL NFR BLD AUTO: 2.2 %
ERYTHROCYTE [DISTWIDTH] IN BLOOD BY AUTOMATED COUNT: 19.9 % (ref 10–15)
GFR SERPL CREATININE-BSD FRML MDRD: 37 ML/MIN/{1.73_M2}
GLUCOSE SERPL-MCNC: 85 MG/DL (ref 70–99)
GLUCOSE UR STRIP-MCNC: NEGATIVE MG/DL
HCO3 BLDV-SCNC: 21 MMOL/L (ref 21–28)
HCT VFR BLD AUTO: 28.9 % (ref 40–53)
HEMOCCULT STL QL: NORMAL
HGB BLD-MCNC: 8.8 G/DL (ref 13.3–17.7)
HGB UR QL STRIP: ABNORMAL
IMM GRANULOCYTES # BLD: 0.1 10E9/L (ref 0–0.4)
IMM GRANULOCYTES NFR BLD: 0.5 %
INR PPP: 1.48 (ref 0.86–1.14)
INTERNAL QC OK POCT: YES
KETONES UR STRIP-MCNC: NEGATIVE MG/DL
LACTATE BLD-SCNC: 1.4 MMOL/L (ref 0.7–2)
LEUKOCYTE ESTERASE UR QL STRIP: ABNORMAL
LYMPHOCYTES # BLD AUTO: 1.7 10E9/L (ref 0.8–5.3)
LYMPHOCYTES NFR BLD AUTO: 15.5 %
MCH RBC QN AUTO: 28.9 PG (ref 26.5–33)
MCHC RBC AUTO-ENTMCNC: 30.4 G/DL (ref 31.5–36.5)
MCV RBC AUTO: 95 FL (ref 78–100)
MONOCYTES # BLD AUTO: 1.5 10E9/L (ref 0–1.3)
MONOCYTES NFR BLD AUTO: 13.5 %
NEUTROPHILS # BLD AUTO: 7.5 10E9/L (ref 1.6–8.3)
NEUTROPHILS NFR BLD AUTO: 67.8 %
NITRATE UR QL: NEGATIVE
NRBC # BLD AUTO: 0 10*3/UL
NRBC BLD AUTO-RTO: 0 /100
PCO2 BLDV: 44 MM HG (ref 40–50)
PH BLDV: 7.29 PH (ref 7.32–7.43)
PH UR STRIP: 6 PH (ref 5–7)
PLATELET # BLD AUTO: 200 10E9/L (ref 150–450)
PO2 BLDV: 18 MM HG (ref 25–47)
POTASSIUM SERPL-SCNC: 4.6 MMOL/L (ref 3.4–5.3)
PROT SERPL-MCNC: 6.9 G/DL (ref 6.8–8.8)
RBC # BLD AUTO: 3.04 10E12/L (ref 4.4–5.9)
RBC #/AREA URNS AUTO: 71 /HPF (ref 0–2)
SODIUM SERPL-SCNC: 139 MMOL/L (ref 133–144)
SOURCE: ABNORMAL
SP GR UR STRIP: 1.02 (ref 1–1.03)
TEST CARD LOT NUMBER: NORMAL
UROBILINOGEN UR STRIP-MCNC: 0 MG/DL (ref 0–2)
WBC # BLD AUTO: 11.1 10E9/L (ref 4–11)
WBC #/AREA URNS AUTO: >182 /HPF (ref 0–5)
WBC CLUMPS #/AREA URNS HPF: PRESENT /HPF

## 2020-07-17 PROCEDURE — 82140 ASSAY OF AMMONIA: CPT | Performed by: EMERGENCY MEDICINE

## 2020-07-17 PROCEDURE — 99285 EMERGENCY DEPT VISIT HI MDM: CPT | Mod: 25 | Performed by: EMERGENCY MEDICINE

## 2020-07-17 PROCEDURE — 12000000 ZZH R&B MED SURG/OB

## 2020-07-17 PROCEDURE — 99223 1ST HOSP IP/OBS HIGH 75: CPT | Mod: AI | Performed by: STUDENT IN AN ORGANIZED HEALTH CARE EDUCATION/TRAINING PROGRAM

## 2020-07-17 PROCEDURE — 70450 CT HEAD/BRAIN W/O DYE: CPT

## 2020-07-17 PROCEDURE — 25800030 ZZH RX IP 258 OP 636: Performed by: STUDENT IN AN ORGANIZED HEALTH CARE EDUCATION/TRAINING PROGRAM

## 2020-07-17 PROCEDURE — 85025 COMPLETE CBC W/AUTO DIFF WBC: CPT | Performed by: EMERGENCY MEDICINE

## 2020-07-17 PROCEDURE — 87086 URINE CULTURE/COLONY COUNT: CPT | Performed by: EMERGENCY MEDICINE

## 2020-07-17 PROCEDURE — 74177 CT ABD & PELVIS W/CONTRAST: CPT

## 2020-07-17 PROCEDURE — 81001 URINALYSIS AUTO W/SCOPE: CPT | Performed by: EMERGENCY MEDICINE

## 2020-07-17 PROCEDURE — 99310 SBSQ NF CARE HIGH MDM 45: CPT | Performed by: NURSE PRACTITIONER

## 2020-07-17 PROCEDURE — 25800030 ZZH RX IP 258 OP 636: Performed by: EMERGENCY MEDICINE

## 2020-07-17 PROCEDURE — 87040 BLOOD CULTURE FOR BACTERIA: CPT | Performed by: PHYSICIAN ASSISTANT

## 2020-07-17 PROCEDURE — 80053 COMPREHEN METABOLIC PANEL: CPT | Performed by: EMERGENCY MEDICINE

## 2020-07-17 PROCEDURE — 87088 URINE BACTERIA CULTURE: CPT | Performed by: EMERGENCY MEDICINE

## 2020-07-17 PROCEDURE — 96365 THER/PROPH/DIAG IV INF INIT: CPT | Mod: 59 | Performed by: EMERGENCY MEDICINE

## 2020-07-17 PROCEDURE — 85730 THROMBOPLASTIN TIME PARTIAL: CPT | Performed by: EMERGENCY MEDICINE

## 2020-07-17 PROCEDURE — 93005 ELECTROCARDIOGRAM TRACING: CPT | Performed by: EMERGENCY MEDICINE

## 2020-07-17 PROCEDURE — 25000125 ZZHC RX 250: Performed by: EMERGENCY MEDICINE

## 2020-07-17 PROCEDURE — 93010 ELECTROCARDIOGRAM REPORT: CPT | Mod: Z6 | Performed by: EMERGENCY MEDICINE

## 2020-07-17 PROCEDURE — U0003 INFECTIOUS AGENT DETECTION BY NUCLEIC ACID (DNA OR RNA); SEVERE ACUTE RESPIRATORY SYNDROME CORONAVIRUS 2 (SARS-COV-2) (CORONAVIRUS DISEASE [COVID-19]), AMPLIFIED PROBE TECHNIQUE, MAKING USE OF HIGH THROUGHPUT TECHNOLOGIES AS DESCRIBED BY CMS-2020-01-R: HCPCS | Performed by: STUDENT IN AN ORGANIZED HEALTH CARE EDUCATION/TRAINING PROGRAM

## 2020-07-17 PROCEDURE — 85610 PROTHROMBIN TIME: CPT | Performed by: EMERGENCY MEDICINE

## 2020-07-17 PROCEDURE — 25000128 H RX IP 250 OP 636: Performed by: EMERGENCY MEDICINE

## 2020-07-17 PROCEDURE — 87186 SC STD MICRODIL/AGAR DIL: CPT | Performed by: EMERGENCY MEDICINE

## 2020-07-17 PROCEDURE — 82803 BLOOD GASES ANY COMBINATION: CPT | Performed by: PHYSICIAN ASSISTANT

## 2020-07-17 PROCEDURE — 83605 ASSAY OF LACTIC ACID: CPT | Performed by: PHYSICIAN ASSISTANT

## 2020-07-17 PROCEDURE — 96361 HYDRATE IV INFUSION ADD-ON: CPT | Performed by: EMERGENCY MEDICINE

## 2020-07-17 PROCEDURE — 82272 OCCULT BLD FECES 1-3 TESTS: CPT | Performed by: EMERGENCY MEDICINE

## 2020-07-17 RX ORDER — PANTOPRAZOLE SODIUM 40 MG/1
40 TABLET, DELAYED RELEASE ORAL 2 TIMES DAILY
Status: DISCONTINUED | OUTPATIENT
Start: 2020-07-18 | End: 2020-07-21 | Stop reason: HOSPADM

## 2020-07-17 RX ORDER — ONDANSETRON 2 MG/ML
4 INJECTION INTRAMUSCULAR; INTRAVENOUS EVERY 6 HOURS PRN
Status: DISCONTINUED | OUTPATIENT
Start: 2020-07-17 | End: 2020-07-21 | Stop reason: HOSPADM

## 2020-07-17 RX ORDER — LIDOCAINE 40 MG/G
CREAM TOPICAL
Status: DISCONTINUED | OUTPATIENT
Start: 2020-07-17 | End: 2020-07-21 | Stop reason: HOSPADM

## 2020-07-17 RX ORDER — LACTULOSE 10 G/15ML
30 SOLUTION ORAL 3 TIMES DAILY
Status: DISCONTINUED | OUTPATIENT
Start: 2020-07-18 | End: 2020-07-21 | Stop reason: HOSPADM

## 2020-07-17 RX ORDER — PROPRANOLOL HYDROCHLORIDE 20 MG/1
20 TABLET ORAL 2 TIMES DAILY
Status: DISCONTINUED | OUTPATIENT
Start: 2020-07-18 | End: 2020-07-21 | Stop reason: HOSPADM

## 2020-07-17 RX ORDER — CEFTRIAXONE SODIUM 2 G/50ML
2 INJECTION, SOLUTION INTRAVENOUS ONCE
Status: COMPLETED | OUTPATIENT
Start: 2020-07-17 | End: 2020-07-17

## 2020-07-17 RX ORDER — ONDANSETRON 4 MG/1
4 TABLET, ORALLY DISINTEGRATING ORAL EVERY 6 HOURS PRN
Status: DISCONTINUED | OUTPATIENT
Start: 2020-07-17 | End: 2020-07-21 | Stop reason: HOSPADM

## 2020-07-17 RX ORDER — LACTULOSE 20 G/30ML
30 SOLUTION ORAL 3 TIMES DAILY
Status: ON HOLD | COMMUNITY
End: 2020-07-21

## 2020-07-17 RX ORDER — ACETAMINOPHEN 325 MG/1
650 TABLET ORAL EVERY 6 HOURS PRN
Status: DISCONTINUED | OUTPATIENT
Start: 2020-07-17 | End: 2020-07-21 | Stop reason: HOSPADM

## 2020-07-17 RX ORDER — SPIRONOLACTONE 25 MG/1
25 TABLET ORAL DAILY
Status: DISCONTINUED | OUTPATIENT
Start: 2020-07-18 | End: 2020-07-20

## 2020-07-17 RX ORDER — LACTULOSE 20 G/30ML
30 SOLUTION ORAL 3 TIMES DAILY
Status: DISCONTINUED | OUTPATIENT
Start: 2020-07-18 | End: 2020-07-17 | Stop reason: CLARIF

## 2020-07-17 RX ORDER — ATORVASTATIN CALCIUM 10 MG/1
20 TABLET, FILM COATED ORAL DAILY
Status: DISCONTINUED | OUTPATIENT
Start: 2020-07-18 | End: 2020-07-21 | Stop reason: HOSPADM

## 2020-07-17 RX ORDER — NALOXONE HYDROCHLORIDE 0.4 MG/ML
.1-.4 INJECTION, SOLUTION INTRAMUSCULAR; INTRAVENOUS; SUBCUTANEOUS
Status: DISCONTINUED | OUTPATIENT
Start: 2020-07-17 | End: 2020-07-21 | Stop reason: HOSPADM

## 2020-07-17 RX ORDER — MONTELUKAST SODIUM 10 MG/1
10 TABLET ORAL AT BEDTIME
Status: DISCONTINUED | OUTPATIENT
Start: 2020-07-18 | End: 2020-07-21 | Stop reason: HOSPADM

## 2020-07-17 RX ORDER — SODIUM CHLORIDE 9 MG/ML
INJECTION, SOLUTION INTRAVENOUS CONTINUOUS
Status: DISCONTINUED | OUTPATIENT
Start: 2020-07-17 | End: 2020-07-18

## 2020-07-17 RX ORDER — FUROSEMIDE 20 MG
20 TABLET ORAL DAILY
Status: DISCONTINUED | OUTPATIENT
Start: 2020-07-18 | End: 2020-07-21 | Stop reason: HOSPADM

## 2020-07-17 RX ORDER — IOPAMIDOL 755 MG/ML
81 INJECTION, SOLUTION INTRAVASCULAR ONCE
Status: COMPLETED | OUTPATIENT
Start: 2020-07-17 | End: 2020-07-17

## 2020-07-17 RX ADMIN — CEFTRIAXONE SODIUM 2 G: 2 INJECTION, SOLUTION INTRAVENOUS at 16:48

## 2020-07-17 RX ADMIN — SODIUM CHLORIDE 60 ML: 9 INJECTION, SOLUTION INTRAVENOUS at 13:30

## 2020-07-17 RX ADMIN — MIDAZOLAM 3 MG: 1 INJECTION INTRAMUSCULAR; INTRAVENOUS at 13:12

## 2020-07-17 RX ADMIN — SODIUM CHLORIDE: 9 INJECTION, SOLUTION INTRAVENOUS at 23:57

## 2020-07-17 RX ADMIN — IOPAMIDOL 81 ML: 755 INJECTION, SOLUTION INTRAVENOUS at 13:30

## 2020-07-17 RX ADMIN — SODIUM CHLORIDE 1000 ML: 9 INJECTION, SOLUTION INTRAVENOUS at 13:15

## 2020-07-17 NOTE — ED NOTES
Pt has 1:1 care due to confusion and risk for falls. Obtained blood cultures and sent urine sample. Patient continues to be very confused although is able to be directed to follow commands.

## 2020-07-17 NOTE — ED NOTES
Patient arrived via ES from [facility]. Paramedics report that the patient has fallen several times in the past week, has gotten increasingly more confused, and has become less verbal. The patient arrives with a catheter in place, a history of an elevated ammonia, and alcoholic cirrhosis. The patient is twitchy, mumbling and uncooperative.

## 2020-07-17 NOTE — PROGRESS NOTES
[facility]  [place] Medical Record Number: [id number]  Place of Service where encounter took place[facility] [[id number]]  Chief Complaint   Patient presents with     RECHECK       HPI:    Abhijeet Mccauley is a 59 year old (1960), who is being seen today for an episodic care visit. HPI information obtained from: facility chart records and facility staff. Today's concern is:     Hepatic encephalopathy (H)  Confusion  Recurrent falls   Abhijeet is unable to answer simple questions today, he was hallucinating during the night.  He was started on lactulose and rifaximin 2 days ago after noting ammonia level of 100.  Prior to that, he had completely refused all lactulose that was ordered  He has fallen 5 times in last 2-3 days.  Nursing reports significant increase in confusion and tremor today.       Past Medical and Surgical History reviewed in Epic today.    MEDICATIONS:    Current Outpatient Medications   Medication Sig Dispense Refill     acetaminophen (TYLENOL) 325 MG tablet Take 2 tablets (650 mg) by mouth 3 times daily       albuterol (VENTOLIN HFA) 108 (90 Base) MCG/ACT inhaler Inhale 2 puffs into the lungs every 4 hours as needed for shortness of breath / dyspnea or wheezing 18 g 11     alum & mag hydroxide-simethicone (MAALOX  ES) 400-400-40 MG/5ML SUSP suspension Take 30 mLs by mouth every 6 hours as needed for indigestion or heartburn       atorvastatin 20 MG PO tablet Take 1 tablet (20 mg) by mouth daily 90 tablet 3     buPROPion (WELLBUTRIN) 75 MG tablet Take 75 mg by mouth daily **NOT EXTENDED RELEASE       diclofenac (VOLTAREN) 1 % topical gel PLACE 2 GRAMS ONTO THE SKIN FOUR TIMES A DAY AS NEEDED FOR MODERATE PAIN 100 g 11     DULERA 200-5 MCG/ACT inhaler INHALE TWO PUFFS BY MOUTH TWICE A DAY 13 g 0     folic acid (FOLVITE) 1 MG tablet Take 1 tablet (1 mg) by mouth daily       furosemide (LASIX) 20 MG tablet Take 20 mg by mouth daily       gabapentin  (NEURONTIN) 300 MG capsule Take 300 mg by mouth 2 times daily       lactulose 20 GM/30ML SOLN Take 30 mLs by mouth 3 times daily       Lidocaine (LIDOCARE) 4 % Patch Place 1 patch onto the skin every 24 hours To prevent lidocaine toxicity, patient should be patch free for 12 hrs daily.       melatonin 3 MG tablet Take 1 tablet (3 mg) by mouth nightly as needed for sleep       montelukast (SINGULAIR) 10 MG tablet Take 1 tablet (10 mg) by mouth At Bedtime 90 tablet 3     nicotine (NICODERM CQ) 14 MG/24HR 24 hr patch Place 1 patch onto the skin daily       nicotine 2 MG MT lozenge Place 1 lozenge (2 mg) inside cheek every hour as needed for smoking cessation 108 lozenge 11     ondansetron (ZOFRAN-ODT) 4 MG ODT tab Take 1 tablet (4 mg) by mouth every 12 hours as needed for nausea or vomiting       pantoprazole (PROTONIX) 40 MG EC tablet Take 1 tablet (40 mg) by mouth 2 times daily 120 tablet 0     polyethylene glycol (MIRALAX) 17 g packet Take 17 g by mouth daily       propranolol (INDERAL) 20 MG tablet Take 1 tablet (20 mg) by mouth 2 times daily 180 tablet 3     rifaximin (XIFAXAN) 550 MG TABS tablet Take 200 mg by mouth 2 times daily       senna-docusate (SENOKOT-S/PERICOLACE) 8.6-50 MG tablet Take 1-2 tablets by mouth daily as needed for constipation Take while on oral narcotics to prevent or treat constipation. 10 tablet 0     vitamin B1 (THIAMINE) 100 MG tablet Take 1 tablet (100 mg) by mouth daily       order for DME Equipment being ordered: 4 wheel walker 1 Units 0     order for DME Equipment being ordered: 2 pairs thigh high compression stockings, strength per patient preference 2 Units 1     order for DME Equipment being ordered: Compression Stockings TWO (2) Pairs, 15-20 mm Hg. 2 Package prn     order for DME Equipment being ordered: bed pull up bar 1 Units 0     order for DME Equipment being ordered: shower chair 1 Device 0     spironolactone (ALDACTONE) 50 MG tablet Take 25 mg by mouth daily       REVIEW OF  "SYSTEMS:  Unobtainable secondary to cognitive impairment.     Objective:  /71   Pulse 66   Temp 98.4  F (36.9  C)   Resp 18   Ht 1.676 m (5' 6\")   Wt 74.7 kg (164 lb 11.2 oz)   SpO2 99%   BMI 26.58 kg/m    Exam:  GENERAL APPEARANCE:  Alert, tremors, unable to answer simple questions, in obvious distress   CHEST/RESP:  respiratory effort normal, lung sounds CTA  , no respiratory distress  CV:  Rate and rhythm reg, nomurmur, no peripheral edema   PSYCH:  Alert but unable to answer simple questions, very confused and tremulous    Labs:   Labs done in SNF are in [place] EPIC. Please refer to them using [facility] Everywhere.    ASSESSMENT/PLAN:  Hepatic encephalopathy (H)  Confusion  Recurrent falls  Patient with significant worsening of mentation, confusion in the setting of known hepatic encephalopathy.  Last ammonia level elevated at 100+, started on lactulose.  He has fallen several times, today fall hitting his head L occiput.  He is likely critically ill.       Orders written by provider at facility  To ED stat for evaluation of significant change of condition.      Electronically signed by:  LOPEZ Campbell CNP     "

## 2020-07-17 NOTE — ED PROVIDER NOTES
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 encephalopathy.  CT of the head, chest and abdomen/pelvis no traumatic abnormality or definite concern acute abnormality other than a nonspecific groundglass opacity in the left upper lobe which could be a developing pneumonia. No respiratory distress or hypoxia.  No recent URI symptoms or cough, or coughing in the ED.  He has chronic stable anemia, no abdominal pain and light brown formed Hemoccult negative stool on rectal examination.  I will try to give a dose of lactulose for presumed persistent hepatic encephalopathy despite normal ammonia level.  We will obtain blood and urine cultures and give a dose of broad-spectrum antibiotic therapy, ceftriaxone.  He will be transferred to [facility] as we currently have no capacity to admit him to our facility.    I have reviewed the nursing notes.    I have reviewed the findings, diagnosis, plan and need for follow up with the patient.    New Prescriptions    No medications on file       Final diagnoses:   Acute metabolic encephalopathy       7/17/2020   [facility], [place] EMERGENCY DEPARTMENT     Chuck Galeana MD  07/17/20 2571

## 2020-07-17 NOTE — LETTER
7/17/2020        RE: Abhijeet Mccauley  [address] [place] [id number]        [facility]  [facility] Record Number: [id number]  Place of Service where encounter took place: [facility] [[id number]]  Chief Complaint   Patient presents with     RECHECK       HPI:    Abhijeet Mccauley is a 59 year old (1960), who is being seen today for an episodic care visit. HPI information obtained from: facility chart records and facility staff. Today's concern is:     Hepatic encephalopathy (H)  Confusion  Recurrent falls   Abhijeet is unable to answer simple questions today, he was hallucinating during the night.  He was started on lactulose and rifaximin 2 days ago after noting ammonia level of 100.  Prior to that, he had completely refused all lactulose that was ordered  He has fallen 5 times in last 2-3 days.  Nursing reports significant increase in confusion and tremor today.       Past Medical and Surgical History reviewed in Epic today.    MEDICATIONS:    Current Outpatient Medications   Medication Sig Dispense Refill     acetaminophen (TYLENOL) 325 MG tablet Take 2 tablets (650 mg) by mouth 3 times daily       albuterol (VENTOLIN HFA) 108 (90 Base) MCG/ACT inhaler Inhale 2 puffs into the lungs every 4 hours as needed for shortness of breath / dyspnea or wheezing 18 g 11     alum & mag hydroxide-simethicone (MAALOX  ES) 400-400-40 MG/5ML SUSP suspension Take 30 mLs by mouth every 6 hours as needed for indigestion or heartburn       atorvastatin 20 MG PO tablet Take 1 tablet (20 mg) by mouth daily 90 tablet 3     buPROPion (WELLBUTRIN) 75 MG tablet Take 75 mg by mouth daily **NOT EXTENDED RELEASE       diclofenac (VOLTAREN) 1 % topical gel PLACE 2 GRAMS ONTO THE SKIN FOUR TIMES A DAY AS NEEDED FOR MODERATE PAIN 100 g 11     DULERA 200-5 MCG/ACT inhaler INHALE TWO PUFFS BY MOUTH TWICE A DAY 13 g 0     folic acid (FOLVITE) 1 MG tablet Take 1 tablet (1 mg) by mouth daily        furosemide (LASIX) 20 MG tablet Take 20 mg by mouth daily       gabapentin (NEURONTIN) 300 MG capsule Take 300 mg by mouth 2 times daily       lactulose 20 GM/30ML SOLN Take 30 mLs by mouth 3 times daily       Lidocaine (LIDOCARE) 4 % Patch Place 1 patch onto the skin every 24 hours To prevent lidocaine toxicity, patient should be patch free for 12 hrs daily.       melatonin 3 MG tablet Take 1 tablet (3 mg) by mouth nightly as needed for sleep       montelukast (SINGULAIR) 10 MG tablet Take 1 tablet (10 mg) by mouth At Bedtime 90 tablet 3     nicotine (NICODERM CQ) 14 MG/24HR 24 hr patch Place 1 patch onto the skin daily       nicotine 2 MG MT lozenge Place 1 lozenge (2 mg) inside cheek every hour as needed for smoking cessation 108 lozenge 11     ondansetron (ZOFRAN-ODT) 4 MG ODT tab Take 1 tablet (4 mg) by mouth every 12 hours as needed for nausea or vomiting       pantoprazole (PROTONIX) 40 MG EC tablet Take 1 tablet (40 mg) by mouth 2 times daily 120 tablet 0     polyethylene glycol (MIRALAX) 17 g packet Take 17 g by mouth daily       propranolol (INDERAL) 20 MG tablet Take 1 tablet (20 mg) by mouth 2 times daily 180 tablet 3     rifaximin (XIFAXAN) 550 MG TABS tablet Take 200 mg by mouth 2 times daily       senna-docusate (SENOKOT-S/PERICOLACE) 8.6-50 MG tablet Take 1-2 tablets by mouth daily as needed for constipation Take while on oral narcotics to prevent or treat constipation. 10 tablet 0     vitamin B1 (THIAMINE) 100 MG tablet Take 1 tablet (100 mg) by mouth daily       order for DME Equipment being ordered: 4 wheel walker 1 Units 0     order for DME Equipment being ordered: 2 pairs thigh high compression stockings, strength per patient preference 2 Units 1     order for DME Equipment being ordered: Compression Stockings TWO (2) Pairs, 15-20 mm Hg. 2 Package prn     order for DME Equipment being ordered: bed pull up bar 1 Units 0     order for DME Equipment being ordered: shower chair 1 Device 0      "spironolactone (ALDACTONE) 50 MG tablet Take 25 mg by mouth daily       REVIEW OF SYSTEMS:  Unobtainable secondary to cognitive impairment.     Objective:  /71   Pulse 66   Temp 98.4  F (36.9  C)   Resp 18   Ht 1.676 m (5' 6\")   Wt 74.7 kg (164 lb 11.2 oz)   SpO2 99%   BMI 26.58 kg/m    Exam:  GENERAL APPEARANCE:  Alert, tremors, unable to answer simple questions, in obvious distress   CHEST/RESP:  respiratory effort normal, lung sounds CTA  , no respiratory distress  CV:  Rate and rhythm reg, nomurmur, no peripheral edema   PSYCH:  Alert but unable to answer simple questions, very confused and tremulous    Labs:   Labs done in SNF are in [place] EPIC. Please refer to them using [employer] Everywhere.    ASSESSMENT/PLAN:  Hepatic encephalopathy (H)  Confusion  Recurrent falls  Patient with significant worsening of mentation, confusion in the setting of known hepatic encephalopathy.  Last ammonia level elevated at 100+, started on lactulose.  He has fallen several times, today fall hitting his head L occiput.  He is likely critically ill.       Orders written by provider at facility  To ED stat for evaluation of significant change of condition.      Electronically signed by:  LOPEZ Campbell CNP         Sincerely,        LOPEZ Campbell CNP    "

## 2020-07-17 NOTE — PROGRESS NOTES
Entered patient's chart to review for possible admission to [facility].  Patient initially accepted for admission to Hospitalist service; however, per emergency department patient requires a 1:1 sitter and due to staffing this is currently unavailable at [facility].  The emergency department has therefore arranged for patient to transfer to [facility] for admission.    Jessy Bishop PA-C on 7/17/2020 at 6:10 PM   [facility] Hospitalist Service

## 2020-07-17 NOTE — ED NOTES
Pt found trying to get out of bed - is redirectable although doesn't really understand why. Patient alarm on bed activated - charge nurse aware - will need a sitter.

## 2020-07-18 LAB
ALBUMIN SERPL-MCNC: 2.8 G/DL (ref 3.4–5)
ALP SERPL-CCNC: 431 U/L (ref 40–150)
ALT SERPL W P-5'-P-CCNC: 25 U/L (ref 0–70)
ANION GAP SERPL CALCULATED.3IONS-SCNC: 6 MMOL/L (ref 3–14)
AST SERPL W P-5'-P-CCNC: 34 U/L (ref 0–45)
BILIRUB DIRECT SERPL-MCNC: 0.4 MG/DL (ref 0–0.2)
BILIRUB SERPL-MCNC: 0.8 MG/DL (ref 0.2–1.3)
BUN SERPL-MCNC: 17 MG/DL (ref 7–30)
CALCIUM SERPL-MCNC: 8.9 MG/DL (ref 8.5–10.1)
CHLORIDE SERPL-SCNC: 107 MMOL/L (ref 94–109)
CO2 SERPL-SCNC: 20 MMOL/L (ref 20–32)
CREAT SERPL-MCNC: 1.76 MG/DL (ref 0.66–1.25)
ERYTHROCYTE [DISTWIDTH] IN BLOOD BY AUTOMATED COUNT: 20 % (ref 10–15)
GFR SERPL CREATININE-BSD FRML MDRD: 41 ML/MIN/{1.73_M2}
GLUCOSE SERPL-MCNC: 88 MG/DL (ref 70–99)
HCT VFR BLD AUTO: 29.5 % (ref 40–53)
HGB BLD-MCNC: 9 G/DL (ref 13.3–17.7)
MCH RBC QN AUTO: 28.7 PG (ref 26.5–33)
MCHC RBC AUTO-ENTMCNC: 30.5 G/DL (ref 31.5–36.5)
MCV RBC AUTO: 94 FL (ref 78–100)
PLATELET # BLD AUTO: 197 10E9/L (ref 150–450)
POTASSIUM SERPL-SCNC: 4.4 MMOL/L (ref 3.4–5.3)
PROT SERPL-MCNC: 6.7 G/DL (ref 6.8–8.8)
RBC # BLD AUTO: 3.14 10E12/L (ref 4.4–5.9)
SARS-COV-2 RNA SPEC QL NAA+PROBE: NOT DETECTED
SODIUM SERPL-SCNC: 133 MMOL/L (ref 133–144)
SPECIMEN SOURCE: NORMAL
WBC # BLD AUTO: 11.3 10E9/L (ref 4–11)

## 2020-07-18 PROCEDURE — 25000132 ZZH RX MED GY IP 250 OP 250 PS 637: Performed by: HOSPITALIST

## 2020-07-18 PROCEDURE — 25000128 H RX IP 250 OP 636: Performed by: STUDENT IN AN ORGANIZED HEALTH CARE EDUCATION/TRAINING PROGRAM

## 2020-07-18 PROCEDURE — 36415 COLL VENOUS BLD VENIPUNCTURE: CPT | Performed by: STUDENT IN AN ORGANIZED HEALTH CARE EDUCATION/TRAINING PROGRAM

## 2020-07-18 PROCEDURE — 25000132 ZZH RX MED GY IP 250 OP 250 PS 637: Performed by: STUDENT IN AN ORGANIZED HEALTH CARE EDUCATION/TRAINING PROGRAM

## 2020-07-18 PROCEDURE — 99233 SBSQ HOSP IP/OBS HIGH 50: CPT | Performed by: INTERNAL MEDICINE

## 2020-07-18 PROCEDURE — 12000000 ZZH R&B MED SURG/OB

## 2020-07-18 PROCEDURE — 80076 HEPATIC FUNCTION PANEL: CPT | Performed by: STUDENT IN AN ORGANIZED HEALTH CARE EDUCATION/TRAINING PROGRAM

## 2020-07-18 PROCEDURE — 25800030 ZZH RX IP 258 OP 636: Performed by: INTERNAL MEDICINE

## 2020-07-18 PROCEDURE — 80048 BASIC METABOLIC PNL TOTAL CA: CPT | Performed by: STUDENT IN AN ORGANIZED HEALTH CARE EDUCATION/TRAINING PROGRAM

## 2020-07-18 PROCEDURE — 85027 COMPLETE CBC AUTOMATED: CPT | Performed by: STUDENT IN AN ORGANIZED HEALTH CARE EDUCATION/TRAINING PROGRAM

## 2020-07-18 RX ORDER — AMOXICILLIN 250 MG
1-2 CAPSULE ORAL DAILY PRN
COMMUNITY
End: 2021-04-15

## 2020-07-18 RX ORDER — KETOCONAZOLE 20 MG/ML
SHAMPOO TOPICAL
Status: ON HOLD | COMMUNITY
End: 2020-08-29

## 2020-07-18 RX ORDER — TRAZODONE HYDROCHLORIDE 50 MG/1
50 TABLET, FILM COATED ORAL AT BEDTIME
Status: ON HOLD | COMMUNITY
End: 2020-07-18

## 2020-07-18 RX ORDER — NYSTATIN 100000 [USP'U]/G
POWDER TOPICAL
COMMUNITY
End: 2021-04-15

## 2020-07-18 RX ORDER — LANOLIN ALCOHOL/MO/W.PET/CERES
CREAM (GRAM) TOPICAL
COMMUNITY

## 2020-07-18 RX ORDER — SODIUM CHLORIDE 9 MG/ML
INJECTION, SOLUTION INTRAVENOUS CONTINUOUS
Status: ACTIVE | OUTPATIENT
Start: 2020-07-18 | End: 2020-07-18

## 2020-07-18 RX ORDER — TRAZODONE HYDROCHLORIDE 50 MG/1
50 TABLET, FILM COATED ORAL
COMMUNITY
End: 2020-09-23

## 2020-07-18 RX ORDER — GABAPENTIN 300 MG/1
300 CAPSULE ORAL 2 TIMES DAILY
Status: DISCONTINUED | OUTPATIENT
Start: 2020-07-19 | End: 2020-07-21 | Stop reason: HOSPADM

## 2020-07-18 RX ORDER — ONDANSETRON 4 MG/1
4 TABLET, FILM COATED ORAL EVERY 12 HOURS PRN
COMMUNITY
End: 2021-04-15

## 2020-07-18 RX ORDER — LANOLIN ALCOHOL/MO/W.PET/CERES
3 CREAM (GRAM) TOPICAL AT BEDTIME
COMMUNITY
End: 2020-09-22

## 2020-07-18 RX ORDER — LIDOCAINE 4 G/G
1 PATCH TOPICAL 2 TIMES DAILY
COMMUNITY
End: 2021-04-26

## 2020-07-18 RX ORDER — KETOCONAZOLE 20 MG/G
CREAM TOPICAL
Status: ON HOLD | COMMUNITY
End: 2020-08-29

## 2020-07-18 RX ORDER — SPIRONOLACTONE 25 MG/1
25 TABLET ORAL DAILY
Status: ON HOLD | COMMUNITY
End: 2020-08-29

## 2020-07-18 RX ORDER — IBUPROFEN 400 MG/1
400 TABLET, FILM COATED ORAL EVERY 6 HOURS PRN
Status: ON HOLD | COMMUNITY
End: 2020-07-21

## 2020-07-18 RX ORDER — SULFAMETHOXAZOLE/TRIMETHOPRIM 800-160 MG
1 TABLET ORAL 2 TIMES DAILY
Status: ON HOLD | COMMUNITY
End: 2020-07-21

## 2020-07-18 RX ADMIN — SODIUM CHLORIDE: 9 INJECTION, SOLUTION INTRAVENOUS at 15:02

## 2020-07-18 RX ADMIN — RIFAXIMIN 550 MG: 550 TABLET ORAL at 21:18

## 2020-07-18 RX ADMIN — LACTULOSE 30 ML: 10 SOLUTION ORAL at 08:42

## 2020-07-18 RX ADMIN — PANTOPRAZOLE SODIUM 40 MG: 40 TABLET, DELAYED RELEASE ORAL at 08:47

## 2020-07-18 RX ADMIN — MONTELUKAST 10 MG: 10 TABLET, FILM COATED ORAL at 21:18

## 2020-07-18 RX ADMIN — PROPRANOLOL HYDROCHLORIDE 20 MG: 20 TABLET ORAL at 21:19

## 2020-07-18 RX ADMIN — SPIRONOLACTONE 25 MG: 25 TABLET ORAL at 08:47

## 2020-07-18 RX ADMIN — FUROSEMIDE 20 MG: 20 TABLET ORAL at 08:47

## 2020-07-18 RX ADMIN — RIFAXIMIN 550 MG: 550 TABLET ORAL at 00:07

## 2020-07-18 RX ADMIN — PROPRANOLOL HYDROCHLORIDE 20 MG: 20 TABLET ORAL at 08:47

## 2020-07-18 RX ADMIN — LACTULOSE 30 ML: 10 SOLUTION ORAL at 21:26

## 2020-07-18 RX ADMIN — ACETAMINOPHEN 650 MG: 325 TABLET, FILM COATED ORAL at 21:18

## 2020-07-18 RX ADMIN — ATORVASTATIN CALCIUM 20 MG: 10 TABLET, FILM COATED ORAL at 08:47

## 2020-07-18 RX ADMIN — PANTOPRAZOLE SODIUM 40 MG: 40 TABLET, DELAYED RELEASE ORAL at 21:18

## 2020-07-18 RX ADMIN — CEFEPIME HYDROCHLORIDE 2 G: 2 INJECTION, POWDER, FOR SOLUTION INTRAVENOUS at 01:51

## 2020-07-18 RX ADMIN — RIFAXIMIN 550 MG: 550 TABLET ORAL at 08:47

## 2020-07-18 ASSESSMENT — ACTIVITIES OF DAILY LIVING (ADL)
ADLS_ACUITY_SCORE: 21
ADLS_ACUITY_SCORE: 20
ADLS_ACUITY_SCORE: 22
ADLS_ACUITY_SCORE: 20
ADLS_ACUITY_SCORE: 21
ADLS_ACUITY_SCORE: 22

## 2020-07-18 NOTE — PLAN OF CARE
Neuro- A&OX3, forgetful.   Most Recent Vitals- Temp: 99  F (37.2  C) Temp src: Oral BP: (!) 143/58 Pulse: 58 Heart Rate: 57 Resp: 16 SpO2: 96 % O2 Device: None (Room air)    Tele/Cardiac- WDL  Resp- RA  Activity- UP with 1/gait belt and walker   Pain- denies   Drips- NS fluid 75cc/hr, see orders to discharge  at 2359 today.   Drains/Tubes- P-IV left AC  Skin- Intact   GI/- pre-admission ga for retention,  on lactulose =Loose stool   Aggression Color- Green  Plan- Con't with ABX.   Misc-    Shree Valentino RN
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
## 2020-07-18 NOTE — PLAN OF CARE
DATE & TIME: 7/17 from 2200 to 0730    Cognitive Concerns/ Orientation : Alert to self only, lathergic, confused at baseline  BEHAVIOR & AGGRESSION TOOL COLOR: Green  CIWA SCORE: N/A   ABNL VS/O2: VSS on RA  MOBILITY: Total cares, able to turn and reposition, needs reminders  PAIN MANAGMENT: denied  DIET: Clear liquid  BOWEL/BLADDER: Chong in placed, patent with adequate output. Incontinent to bowel  ABNL LAB/BG: UTI (+) Cl 110, Cr 1.92, WBC 11.1, Hgb 8.8, albumin 2.9, Alkaline phosphate 507, INR 1.48  DRAIN/DEVICES: IVF at 100 mL/hr, intermittent IV abx  TELEMETRY RHYTHM: N/A  SKIN: Jaundicy looking. Bruised, scabs, dry/eczema.     TESTS/PROCEDURES: asymptomatic COVID swaped. Result pending   D/C DAY/GOALS/PLACE: Possible discharge   OTHER IMPORTANT INFO: Pt is labile mood, attempts to pull catheter at times.mostly calm and cooperative.  Sitter at bedside. Neuro intact except confused.    MD/RN ROUNDING SIGNED OFF D/E SHIFT: Done in AM  COMMIT TO SIT DONE AND SIGNED OFF confused, unable to follow command

## 2020-07-18 NOTE — PLAN OF CARE
"([id number]) pt is disoriented to situation, little forgetful, calm this shift. Denies chest pain or SOB. Refused lactulose, stated \" I already took it this morning\". Chong patent.  Repositioned per pt's comfort. Will continue to monitor.   "

## 2020-07-18 NOTE — PROGRESS NOTES
SW:  D:  Received a voice mail from Rowena, sister of patient.  She reports he came out of [facility] dehydrated with high pneumonia level and they had him on a every hour watch and still he fell. She doesn't want him to return to [facility] [place].  She prefers he be closer to his brother who lives in [place] and can visit patient daily.  She asked  for referrals to be made to [facility] in [facility] or [facility] in [place].  PLAN:    will follow up with sister who requests a call back and also meet with patient.

## 2020-07-18 NOTE — PROGRESS NOTES
[facility]    Hospitalist Progress Note    Brief Summary:  This is a 59-year-old male with history of alcoholic liver cirrhosis, alcohol dependence, ulcerative colitis, peptic ulcer disease, hypertension, history of GI bleed in the past, history of esophageal varices in the past recent EGD negative for varices, history of alcohol withdrawal seizures, AML now in remission for more than 10 years, recently admitted to [place] [facility] [place] from June 12 to June 25, 2020 with alcohol withdrawal seizures, acute respiratory failure requiring intubation mechanical ventilation, UTI with left ureteric stone requiring stent placement, bleeding per rectal neck respiratory failure because of withdrawal and seizures.  He was discharged on June 25 to nursing home in stable condition.  He was diagnosed with acute hepatic encephalopathy on July 15 with ammonia level of 101, started on rifaximin and lactulose.  There is noncompliant with his lactulose.    Now the patient was transferred to [facility] emergency room from nursing home because of altered mental status, with worsening confusion and recurrent falls.  Patient found to have UTI and encephalopathy and subsequently transferred to [facility] for further management.       Assessment & Plan        Acute metabolic encephalopathy most likely secondary UTI  UTI  Recently treated hepatic encephalopathy    This is a 59-year-old male with multiple comorbidities as mentioned above, will recently have a left ureteral stent placed on June 13 by urology while he was at the [facility].  UA still consistent with UTI at this time.  Urine cultures obtained blood cultures obtained and pending at this time.  Is started on IV cefepime we will continue with that.  Previous urine culture was growing enterococcus clinically complex.  I agree with cefepime we will continue that for now.  Patient is clinically improving at this time.  Is less confused this  morning .  I will continue his rifaximin and lactulose as well at this time.  His ammonia levels within normal limit on admission.       Persistently elevated alkaline phosphatase  Alcoholic liver cirrhosis  Hx of Alcohol use disorder    Patient does have history of alcoholic cirrhosis and persistently elevated alkaline phosphatase.  He was evaluated by the GI while he was recently admitted to [facility].  MRCP was done without evidence of any biliary pathology, does have cholelithiasis.  He will need further assessment with EUS as an outpatient basis.  At this time there is no significant ascites on the CT scan of the abdomen done on admission.  No concern for SBP at this time.  Continue lactulose 30 mL 3 times a day, rifaximin, spironolactone and propranolol.  Patient is not drinking alcohol recently as he was and nursing home.       Rib fractures  - Tylenol 650 mg as needed  - Lidocaine patch daily  - hold PTA gabapentin 600 mg TID  - PT and OT  Will restart his gabapentin at a lower dose of 300 mg twice daily, given his renal failure  From tomorrow.     GENEVIEVE on CKD  Patient does have chronic kidney disease, with fluctuating baseline creatinine.  His baseline creatinine anywhere from 1.4-1.7.  He was discharged from the hospital on June 25 his creatinine was 1.49.  Admitted with creatinine of 1.92, patient looks quite dehydrated.  He did receive some IV fluids overnight, his creatinine now trending down.  I will continue with gentle hydration at this time at 75 mL/h for another 12 hours.  Avoid NSAIDs and nephrotoxic medication.       Acute on chronic macrocytic anemia- Combination of critical illness, liver disease, alcohol use, and recent blood loss.  S/p hip replacement- stable  COPD- Continue baseline inhalers and PRN albuterol, not in acute exacerbation.  Hyperlipidemia- Continue home atorvastatin  Generalized anxiety disorder - Continue to hold bupropion; holding trazadone and hydroxyzine  Tobacco  use - Continue nicotine patch  Gastritis- Continue protonix BID        Call patient brother Jerry Mccauley on the phone and updated him on the patient's status.  Was asking about this to monitor the cause of the infection.  The patient does need to follow-up with the urology and have the stone removed once he is done with his antibiotics.         DVT Prophylaxis: Pneumatic Compression Devices  Code Status: Full Code    Disposition: Expected discharge in 1 to 2 days if remains stable.    Kole Vargas MD  Text Page  (7am - 6pm)    Interval History   Patient was sleeping but woke up easily.  His oriented to person and time.  But not to place.  Denies any chest pain fever chills nausea vomiting abdominal pain dysuria major constipation diarrhea at this time.    No other significant event overnight    -Data reviewed today: I reviewed all new labs and imaging results over the last 24 hours. I personally reviewed no images or EKG's today.    Physical Exam   Temp: 98.4  F (36.9  C) Temp src: Oral BP: 120/57 Pulse: 66 Heart Rate: 63 Resp: 16 SpO2: 98 % O2 Device: None (Room air)    Vitals:    07/17/20 2128 07/18/20 0600   Weight: 71.3 kg (157 lb 3.2 oz) 71.6 kg (157 lb 13.6 oz)     Vital Signs with Ranges  Temp:  [97.5  F (36.4  C)-98.6  F (37  C)] 98.4  F (36.9  C)  Pulse:  [60-80] 66  Heart Rate:  [63-67] 63  Resp:  [16-33] 16  BP: ()/(48-89) 120/57  SpO2:  [94 %-100 %] 98 %  I/O last 3 completed shifts:  In: 220 [P.O.:220]  Out: 475 [Urine:475]    Constitutional: Awake alert in no acute distress  Eyes: Lids and lashes normal, pupils equal, round and reactive to light, extra ocular muscles intact, sclera clear, conjunctiva normal  Respiratory: No increased work of breathing, good air exchange, clear to auscultation bilaterally, no crackles or wheezing  Cardiovascular: Normal apical impulse, regular rate and rhythm, normal S1 and S2, no S3 or S4, and no murmur noted  GI: No scars, normal bowel sounds, soft,  non-distended, non-tender, no masses palpated, no hepatosplenomegally  Skin: no bruising or bleeding, very dry skin.  Musculoskeletal: no lower extremity pitting edema present  Neurologic: No focal deficit    Medications     sodium chloride         atorvastatin  20 mg Oral Daily     [START ON 7/19/2020] ceFEPIme (MAXIPIME) IV  2 g Intravenous Q24H     furosemide  20 mg Oral Daily     lactulose  30 mL Oral TID     montelukast  10 mg Oral At Bedtime     pantoprazole  40 mg Oral BID     propranolol  20 mg Oral BID     rifaximin  550 mg Oral BID     sodium chloride (PF)  3 mL Intracatheter Q8H     spironolactone  25 mg Oral Daily       Data   Recent Labs   Lab 07/18/20  0841 07/17/20  1124 07/13/20  0617   WBC 11.3* 11.1* 11.4*   HGB 9.0* 8.8* 8.0*   MCV 94 95 98    200 209   INR  --  1.48*  --     139 137   POTASSIUM 4.4 4.6 4.1   CHLORIDE 107 110* 106   CO2 20 23 25   BUN 17 21 22   CR 1.76* 1.92* 1.73*   ANIONGAP 6 6 6   BEE 8.9 9.4 9.3   GLC 88 85 51*   ALBUMIN 2.8* 2.9* 2.7*   PROTTOTAL 6.7* 6.9 6.8   BILITOTAL 0.8 0.8 1.0   ALKPHOS 431* 507* 574*   ALT 25 28 34   AST 34 35 28       Recent Results (from the past 24 hour(s))   CT Head w/o Contrast    Narrative    CT SCAN OF THE HEAD WITHOUT CONTRAST   7/17/2020 1:49 PM     HISTORY: Altered level of consciousness (LOC), unexplained.    TECHNIQUE:  Axial images of the head and coronal reformations without  IV contrast material. Radiation dose for this scan was reduced using  automated exposure control, adjustment of the mA and/or kV according  to patient size, or iterative reconstruction technique.    COMPARISON: 6/12/2020    FINDINGS: There is no evidence of intracranial hemorrhage, mass, acute  infarct or anomaly. The ventricles are normal in size, shape and  configuration. Mild diffuse parenchymal volume loss. Mild patchy  periventricular white matter hypodensities which are nonspecific, but  likely related to chronic microvascular ischemic disease.      Bilateral lens replacements. Orbits otherwise normal. The paranasal  sinuses are free of significant disease. The mastoid and middle ear  cavities are clear. The bony calvarium and bones of the skull base  appear intact.       Impression    IMPRESSION:     1. No evidence of acute intracranial hemorrhage, mass, or herniation.  2. Mild diffuse parenchymal volume loss and white matter changes  likely due to chronic microvascular ischemic disease.    MAUREEN PRADO MD   CT Chest/Abdomen/Pelvis w Contrast    Narrative    CT CHEST/ABDOMEN/PELVIS WITH CONTRAST 7/17/2020 1:49 PM    CLINICAL HISTORY: Falls, bruises, altered level of consciousness and  unable to provide history.    TECHNIQUE: CT scan of the chest, abdomen, and pelvis was performed  following injection of IV contrast. Multiplanar reformats were  obtained. Dose reduction techniques were used.      CONTRAST: 81 mL Isovue 370    COMPARISON: None.    FINDINGS:   LUNGS AND PLEURA: Images through the lungs are degraded by respiratory  motion. No pneumothorax or pleural effusion. Nonspecific groundglass  opacity in the left upper lobe (series 5, image 72).    MEDIASTINUM/AXILLAE: No thoracic or axillary adenopathy. Mild coronary  atherosclerosis.    HEPATOBILIARY: Cirrhotic liver without definite focal lesion.  Calcified gallstone. There are large gastroesophageal varices. No  ascites. Splenorenal collateral vessels also noted.    PANCREAS: Normal.    SPLEEN: Normal.    ADRENAL GLANDS: Normal.    KIDNEYS/BLADDER: Right kidney is unremarkable. There is a double-J  ureteral stent on the left. There is abnormal calcification in the  upper pole of the left kidney associated with cortical thinning,  possibly related to chronic infection.  Urinary bladder decompressed  by a Chong catheter.    BOWEL: Fluid and air present in the colon, compatible with diarrhea.  No bowel obstruction or free intraperitoneal air.    PELVIC ORGANS: Normal.    ADDITIONAL FINDINGS: Moderate  nonaneurysmal aortic atherosclerosis.    MUSCULOSKELETAL: Previous left hip replacement. Multiple bilateral rib  fractures appear to be in various stages of healing. Fractures of the  posterior sixth and seventh ribs appear relatively new but were  present on 6/14/2020. Wedge deformity noted of L1, unchanged from  prior.      Impression    IMPRESSION:  1.  Multiple bilateral rib fractures in various stages of healing. No  new fractures compared to 6/14/2020.  2.  Nonspecific groundglass opacity in the left upper lobe could be  related to developing infection.  3.  Bibasilar airspace opacities and pleural effusions present on the  prior study have resolved.  4.  Cirrhotic liver with large portal venous collateral vessels. No  ascites on the current study.  5.  Probable diarrhea.  6.  Cholelithiasis.  7.  Left-sided ureteral stent associated with calcification and upper  pole hydronephrosis. Chronic infection not excluded.    MAGGY KAMINSKI MD

## 2020-07-18 NOTE — PROVIDER NOTIFICATION
Paged hospitalist : patient has a ga catheter, but there is no orders. Just wondering if we need to continue? Thanks     @ 1612: received call back from Dr. Vargas to continue ga catheter until tomorrow to monitor output (elevated creatnine) and to find out whether pt has a chronic ga catheter from nursing home.     @ 1800: Attempted to call [facility] living  to inquire whether pt has a chronic ga catheter, no response. Also, attempted to call pt's brother, Bhupendra, no response. Will attempt later and pass it on to the oncoming RN.

## 2020-07-18 NOTE — PHARMACY-ADMISSION MEDICATION HISTORY
Pharmacy Medication History  Admission medication history interview status for the 7/17/2020  admission is complete. See EPIC admission navigator for prior to admission medications     Medication history sources: MAR ([place] [phone])  Medication history source reliability: Good  Adherence assessment: Good    Significant changes made to the medication list:  Added bactrim, advair, ibuprofen, ketconazole, nystatin, trazodone    Additional medication history information:   Ketoconazole shampoo not started yet due to hospitalization.     Medication reconciliation completed by provider prior to medication history? Yes    Time spent in this activity: 45 min    Prior to Admission medications    Medication Sig Last Dose Taking? Auth Provider   acetaminophen (TYLENOL) 325 MG tablet Take 2 tablets (650 mg) by mouth 3 times daily 7/17/2020 at am Yes Flora Alejo MD   albuterol (VENTOLIN HFA) 108 (90 Base) MCG/ACT inhaler Inhale 2 puffs into the lungs every 4 hours as needed for shortness of breath / dyspnea or wheezing prn Yes Josefina Eden DO   alum & mag hydroxide-simethicone (MAALOX  ES) 400-400-40 MG/5ML SUSP suspension Take 30 mLs by mouth every 6 hours as needed for indigestion or heartburn prn Yes Sydnie Unger MD   atorvastatin 20 MG PO tablet Take 1 tablet (20 mg) by mouth daily 7/16/2020 at hs Yes Chidi Valenzuela MD   buPROPion (WELLBUTRIN) 75 MG tablet Take 75 mg by mouth daily **NOT EXTENDED RELEASE 7/16/2020 at hs Yes Reported, Patient   diclofenac (VOLTAREN) 1 % topical gel PLACE 2 GRAMS ONTO THE SKIN FOUR TIMES A DAY AS NEEDED FOR MODERATE PAIN prn Yes Chidi Valenzuela MD   fluticasone-salmeterol (ADVAIR) 500-50 MCG/DOSE inhaler Inhale 1 puff into the lungs 2 times daily 7/13/2020 at am Yes Unknown, Entered By History   folic acid (FOLVITE) 1 MG tablet Take 1 tablet (1 mg) by mouth daily 7/17/2020 at am Yes Sydnie Unger MD   furosemide (LASIX) 20 MG tablet Take 20 mg  by mouth daily 7/17/2020 at am Yes Reported, Patient   gabapentin (NEURONTIN) 300 MG capsule Take 300 mg by mouth 2 times daily 7/17/2020 at am Yes Reported, Patient   ibuprofen (ADVIL/MOTRIN) 400 MG tablet Take 400 mg by mouth every 6 hours as needed for pain prn Yes Unknown, Entered By History   ketoconazole (NIZORAL) 2 % external cream Apply to face topically twice daily for dermatitis (discontinue when resolved and change order to twice daily as needed, start date 7/14/20) 7/17/2020 at am Yes Unknown, Entered By History   ketoconazole (NIZORAL) 2 % external shampoo Apply to scalp and beard topically 2 times per week (Tuesday and Friday) for dermatitis for 4 weeks (start date 7/14/20)  Yes Unknown, Entered By History   lactulose 20 GM/30ML SOLN Take 30 mLs by mouth 3 times daily 7/17/2020 at 0600 Yes Reported, Patient   Lidocaine (LIDOCARE) 4 % Patch Place 1 patch onto the skin 2 times daily 7/17/2020 at am Yes Unknown, Entered By History   melatonin 3 MG tablet Take 3 mg by mouth At Bedtime 7/16/2020 at hs Yes Unknown, Entered By History   mineral oil-white petrolatum (EUCERIN) CREA cream Apply topically to back at bedtime for xerosis 7/16/2020 Yes Unknown, Entered By History   montelukast (SINGULAIR) 10 MG tablet Take 1 tablet (10 mg) by mouth At Bedtime 7/16/2020 at hs Yes Chidi Valenzuela MD   nicotine (NICODERM CQ) 14 MG/24HR 24 hr patch Place 1 patch onto the skin daily 7/17/2020 at am Yes Sydnie Unger MD   nicotine 2 MG MT lozenge Place 1 lozenge (2 mg) inside cheek every hour as needed for smoking cessation prn Yes Chidi Valenzuela MD   nystatin (MYCOSTATIN) 130032 UNIT/GM external powder Apply to groin topically twice daily 7/17/2020 at am Yes Unknown, Entered By History   ondansetron (ZOFRAN) 4 MG tablet Take 4 mg by mouth every 12 hours as needed for nausea or vomiting prn Yes Unknown, Entered By History   pantoprazole (PROTONIX) 40 MG EC tablet Take 1 tablet (40 mg) by mouth 2 times daily  7/17/2020 at am Yes Wilver Marrufo MD   polyethylene glycol (MIRALAX) 17 g packet Take 17 g by mouth daily 7/17/2020 at am Yes Parisa Dean APRN CNP   propranolol (INDERAL) 20 MG tablet Take 1 tablet (20 mg) by mouth 2 times daily 7/17/2020 at am Yes Parisa Dean APRN CNP   rifaximin (XIFAXAN) 550 MG TABS tablet Take 550 mg by mouth 2 times daily  7/17/2020 at am Yes Reported, Patient   senna-docusate (SENOKOT-S/PERICOLACE) 8.6-50 MG tablet Take 1-2 tablets by mouth daily as needed for constipation prn Yes Unknown, Entered By History   spironolactone (ALDACTONE) 25 MG tablet Take 25 mg by mouth daily 7/17/2020 at am Yes Unknown, Entered By History   sulfamethoxazole-trimethoprim (BACTRIM DS) 800-160 MG tablet Take 1 tablet by mouth 2 times daily For UTI for 14 days (start date 7/12/20) 7/17/2020 at am Yes Unknown, Entered By History   traZODone (DESYREL) 50 MG tablet Take 50 mg by mouth nightly as needed for sleep prn Yes Unknown, Entered By History   vitamin B1 (THIAMINE) 100 MG tablet Take 1 tablet (100 mg) by mouth daily 7/17/2020 at am Yes Flora Alejo MD   order for DME Equipment being ordered: 4 wheel walker   Chidi Valenzuela MD   order for DME Equipment being ordered: 2 pairs thigh high compression stockings, strength per patient preference   Chidi Valenzuela MD   order for DME Equipment being ordered: Compression Stockings TWO (2) Pairs, 15-20 mm Hg.   Chidi Valenzuela MD   order for DME Equipment being ordered: bed pull up bar   Chidi Valenzuela MD   order for DME Equipment being ordered: shower chair   Chidi Valenzuela MD

## 2020-07-19 ENCOUNTER — APPOINTMENT (OUTPATIENT)
Dept: PHYSICAL THERAPY | Facility: CLINIC | Age: 60
End: 2020-07-19
Attending: INTERNAL MEDICINE
Payer: COMMERCIAL

## 2020-07-19 ENCOUNTER — APPOINTMENT (OUTPATIENT)
Dept: OCCUPATIONAL THERAPY | Facility: CLINIC | Age: 60
End: 2020-07-19
Attending: INTERNAL MEDICINE
Payer: COMMERCIAL

## 2020-07-19 LAB
ALBUMIN SERPL-MCNC: 2.5 G/DL (ref 3.4–5)
ANION GAP SERPL CALCULATED.3IONS-SCNC: 5 MMOL/L (ref 3–14)
BACTERIA SPEC CULT: ABNORMAL
BASOPHILS # BLD AUTO: 0.1 10E9/L (ref 0–0.2)
BASOPHILS NFR BLD AUTO: 0.6 %
BUN SERPL-MCNC: 15 MG/DL (ref 7–30)
CALCIUM SERPL-MCNC: 8.7 MG/DL (ref 8.5–10.1)
CHLORIDE SERPL-SCNC: 112 MMOL/L (ref 94–109)
CO2 SERPL-SCNC: 21 MMOL/L (ref 20–32)
CREAT SERPL-MCNC: 1.78 MG/DL (ref 0.66–1.25)
DIFFERENTIAL METHOD BLD: ABNORMAL
EOSINOPHIL # BLD AUTO: 0.5 10E9/L (ref 0–0.7)
EOSINOPHIL NFR BLD AUTO: 5.8 %
ERYTHROCYTE [DISTWIDTH] IN BLOOD BY AUTOMATED COUNT: 19.8 % (ref 10–15)
GFR SERPL CREATININE-BSD FRML MDRD: 40 ML/MIN/{1.73_M2}
GLUCOSE SERPL-MCNC: 114 MG/DL (ref 70–99)
HCT VFR BLD AUTO: 27.8 % (ref 40–53)
HGB BLD-MCNC: 8.4 G/DL (ref 13.3–17.7)
IMM GRANULOCYTES # BLD: 0 10E9/L (ref 0–0.4)
IMM GRANULOCYTES NFR BLD: 0.3 %
LYMPHOCYTES # BLD AUTO: 1.3 10E9/L (ref 0.8–5.3)
LYMPHOCYTES NFR BLD AUTO: 15.6 %
MCH RBC QN AUTO: 28.1 PG (ref 26.5–33)
MCHC RBC AUTO-ENTMCNC: 30.2 G/DL (ref 31.5–36.5)
MCV RBC AUTO: 93 FL (ref 78–100)
MONOCYTES # BLD AUTO: 1.1 10E9/L (ref 0–1.3)
MONOCYTES NFR BLD AUTO: 12.3 %
NEUTROPHILS # BLD AUTO: 5.6 10E9/L (ref 1.6–8.3)
NEUTROPHILS NFR BLD AUTO: 65.4 %
NRBC # BLD AUTO: 0 10*3/UL
NRBC BLD AUTO-RTO: 0 /100
PHOSPHATE SERPL-MCNC: 2.7 MG/DL (ref 2.5–4.5)
PLATELET # BLD AUTO: 157 10E9/L (ref 150–450)
POTASSIUM SERPL-SCNC: 3.9 MMOL/L (ref 3.4–5.3)
RBC # BLD AUTO: 2.99 10E12/L (ref 4.4–5.9)
SODIUM SERPL-SCNC: 138 MMOL/L (ref 133–144)
SPECIMEN SOURCE: ABNORMAL
WBC # BLD AUTO: 8.6 10E9/L (ref 4–11)

## 2020-07-19 PROCEDURE — 36415 COLL VENOUS BLD VENIPUNCTURE: CPT | Performed by: INTERNAL MEDICINE

## 2020-07-19 PROCEDURE — 12000000 ZZH R&B MED SURG/OB

## 2020-07-19 PROCEDURE — 97165 OT EVAL LOW COMPLEX 30 MIN: CPT | Mod: GO | Performed by: OCCUPATIONAL THERAPIST

## 2020-07-19 PROCEDURE — 99233 SBSQ HOSP IP/OBS HIGH 50: CPT | Performed by: INTERNAL MEDICINE

## 2020-07-19 PROCEDURE — 25000132 ZZH RX MED GY IP 250 OP 250 PS 637: Performed by: STUDENT IN AN ORGANIZED HEALTH CARE EDUCATION/TRAINING PROGRAM

## 2020-07-19 PROCEDURE — 99221 1ST HOSP IP/OBS SF/LOW 40: CPT | Performed by: UROLOGY

## 2020-07-19 PROCEDURE — 97530 THERAPEUTIC ACTIVITIES: CPT | Mod: GO | Performed by: OCCUPATIONAL THERAPIST

## 2020-07-19 PROCEDURE — 25000128 H RX IP 250 OP 636: Performed by: HOSPITALIST

## 2020-07-19 PROCEDURE — 25000132 ZZH RX MED GY IP 250 OP 250 PS 637: Performed by: INTERNAL MEDICINE

## 2020-07-19 PROCEDURE — 25000132 ZZH RX MED GY IP 250 OP 250 PS 637: Performed by: HOSPITALIST

## 2020-07-19 PROCEDURE — 80069 RENAL FUNCTION PANEL: CPT | Performed by: INTERNAL MEDICINE

## 2020-07-19 PROCEDURE — 97530 THERAPEUTIC ACTIVITIES: CPT | Mod: GP

## 2020-07-19 PROCEDURE — 97116 GAIT TRAINING THERAPY: CPT | Mod: GP

## 2020-07-19 PROCEDURE — 85025 COMPLETE CBC W/AUTO DIFF WBC: CPT | Performed by: INTERNAL MEDICINE

## 2020-07-19 PROCEDURE — 25000128 H RX IP 250 OP 636: Performed by: INTERNAL MEDICINE

## 2020-07-19 PROCEDURE — 97161 PT EVAL LOW COMPLEX 20 MIN: CPT | Mod: GP

## 2020-07-19 RX ORDER — TRAZODONE HYDROCHLORIDE 50 MG/1
50 TABLET, FILM COATED ORAL
Status: DISCONTINUED | OUTPATIENT
Start: 2020-07-19 | End: 2020-07-21 | Stop reason: HOSPADM

## 2020-07-19 RX ORDER — PIPERACILLIN SODIUM, TAZOBACTAM SODIUM 3; .375 G/15ML; G/15ML
3.38 INJECTION, POWDER, LYOPHILIZED, FOR SOLUTION INTRAVENOUS EVERY 6 HOURS
Status: DISCONTINUED | OUTPATIENT
Start: 2020-07-19 | End: 2020-07-20

## 2020-07-19 RX ADMIN — LACTULOSE 30 ML: 10 SOLUTION ORAL at 16:32

## 2020-07-19 RX ADMIN — LACTULOSE 30 ML: 10 SOLUTION ORAL at 21:58

## 2020-07-19 RX ADMIN — SPIRONOLACTONE 25 MG: 25 TABLET ORAL at 08:54

## 2020-07-19 RX ADMIN — GABAPENTIN 300 MG: 300 CAPSULE ORAL at 21:56

## 2020-07-19 RX ADMIN — PIPERACILLIN SODIUM AND TAZOBACTAM SODIUM 3.38 G: 3; .375 INJECTION, POWDER, LYOPHILIZED, FOR SOLUTION INTRAVENOUS at 18:02

## 2020-07-19 RX ADMIN — CEFEPIME HYDROCHLORIDE 2 G: 2 INJECTION, POWDER, FOR SOLUTION INTRAVENOUS at 01:28

## 2020-07-19 RX ADMIN — RIFAXIMIN 550 MG: 550 TABLET ORAL at 21:56

## 2020-07-19 RX ADMIN — PROPRANOLOL HYDROCHLORIDE 20 MG: 20 TABLET ORAL at 21:56

## 2020-07-19 RX ADMIN — MONTELUKAST 10 MG: 10 TABLET, FILM COATED ORAL at 21:56

## 2020-07-19 RX ADMIN — ATORVASTATIN CALCIUM 20 MG: 10 TABLET, FILM COATED ORAL at 08:54

## 2020-07-19 RX ADMIN — PANTOPRAZOLE SODIUM 40 MG: 40 TABLET, DELAYED RELEASE ORAL at 08:54

## 2020-07-19 RX ADMIN — LACTULOSE 30 ML: 10 SOLUTION ORAL at 08:55

## 2020-07-19 RX ADMIN — FUROSEMIDE 20 MG: 20 TABLET ORAL at 08:54

## 2020-07-19 RX ADMIN — GABAPENTIN 300 MG: 300 CAPSULE ORAL at 08:54

## 2020-07-19 RX ADMIN — PROPRANOLOL HYDROCHLORIDE 20 MG: 20 TABLET ORAL at 08:54

## 2020-07-19 RX ADMIN — TRAZODONE HYDROCHLORIDE 50 MG: 50 TABLET ORAL at 21:56

## 2020-07-19 RX ADMIN — PANTOPRAZOLE SODIUM 40 MG: 40 TABLET, DELAYED RELEASE ORAL at 21:56

## 2020-07-19 RX ADMIN — ACETAMINOPHEN 650 MG: 325 TABLET, FILM COATED ORAL at 19:26

## 2020-07-19 RX ADMIN — RIFAXIMIN 550 MG: 550 TABLET ORAL at 08:54

## 2020-07-19 RX ADMIN — PIPERACILLIN SODIUM AND TAZOBACTAM SODIUM 3.38 G: 3; .375 INJECTION, POWDER, LYOPHILIZED, FOR SOLUTION INTRAVENOUS at 12:56

## 2020-07-19 ASSESSMENT — ACTIVITIES OF DAILY LIVING (ADL)
ADLS_ACUITY_SCORE: 19
ADLS_ACUITY_SCORE: 21
ADLS_ACUITY_SCORE: 19
ADLS_ACUITY_SCORE: 20
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
## 2020-07-19 NOTE — PLAN OF CARE
Discharge Planner OT   Patient plan for discharge: Did not discuss  Current status: Patient having difficulty giving clear answers regarding PLOF, alert to time not place.  Patient demonstrated bed mobility and transfer to chair; impulsive and not wanting to use walker for gait belt.  Declined further activity.  Barriers to return to prior living situation: Cognition, weakness  Recommendations for discharge: TCU  Rationale for recommendations: Patient would benefit from further therapy for increased independence in ADLs/mobility and safe discharge planning.       Entered by: Miriam Dorsey 07/19/2020 5:15 PM

## 2020-07-19 NOTE — PROGRESS NOTES
"   07/19/20 1023   Quick Adds   Type of Visit Initial PT Evaluation   Living Environment   Lives With sibling(s)  (sister)   Living Arrangements house   Home Accessibility stairs to enter home   Number of Stairs, Main Entrance 2   Stair Railings, Main Entrance railings on both sides of stairs  (per pt)   Living Environment Comment All needs met main level per pt. Bathroom includes walk in shower with chair per pt.    Self-Care   Usual Activity Tolerance good   Current Activity Tolerance moderate   Equipment Currently Used at Home cane, straight;shower chair;walker, rolling   Functional Level Prior   Ambulation 1-->assistive equipment  (cane outside at baseline)   Transferring 0-->independent   Bathing 1-->assistive equipment  (Shower chair)   Fall history within last six months yes   Number of times patient has fallen within last six months   (5-6 in past week or so, \"I imagine\" more before that per pt)   Prior Functional Level Comment Pt reports he uses no AD inside house, uses cane outside the house (can't use 4WW bc \"too fern\") - later states he uses 4WW \"pretty much everywhere\"   General Information   Onset of Illness/Injury or Date of Surgery - Date 07/17/20   Referring Physician Chuck Galeana MD    Patient/Family Goals Statement not stated   Pertinent History of Current Problem (include personal factors and/or comorbidities that impact the POC) Pt admitted 7/17 from TCU with AMS, found to have UTI. Pt did have recent hospitalization with discharge to TCU 6/25. Pt also with recent L hip replacement late May/early June of this year. PMH significant for alcohol use disorder, alcohol withdrawal seizures, EtOH cirrhosis, COPD, CKD III, AML (diagnosed 2008, s/p chemotherapy, complete remission >10 years).   Precautions/Limitations fall precautions   General Observations Pt is pleasant & agreeable to PT   General Info Comments Activity: Up with assist   Cognitive Status Examination   Orientation " "person;time  (\"[facility], knows July 2020)   Level of Consciousness alert   Follows Commands and Answers Questions 100% of the time;able to follow single-step instructions   Personal Safety and Judgment impaired   Pain Assessment   Patient Currently in Pain Yes, see Vital Sign flowsheet  (reports 8.5/10 back pain, states this is typical level )   Posture    Posture Forward head position;Protracted shoulders   Range of Motion (ROM)   ROM Comment Pt appears to have some decreased L hip ROM, with difficulty donning brief over L foot when seated at toilet; noted to have recent L hip replacement early June   Strength   Strength Comments Pt demos some functional LE weakness, not able to stand from chair without UE pushoff   Bed Mobility   Bed Mobility Comments Supine>sit with HOB slightly elevated and [place], pt reaching to PT for support   Transfer Skills   Transfer Comments Sit>stand from bed with FWW and CGA. Pt not able to stand from chair without UE pushoff despite multiple attempts   Gait   Gait Comments Ambulated with FWW with CGA with good pace, some unsteadiness with head turns but no overt LOB; ambulated a few feet in room without AD but pt always reaching to wall/bed/doorway for support   Balance   Balance Comments Impaired balance, needs UE support and CGA-close SBA for safe dynamic mobility   Sensory Examination   Sensory Perception Comments Pt reports baseline numbness/tingling B feet & ankles   Modality Interventions   Planned Modality Interventions Cryotherapy   General Therapy Interventions   Planned Therapy Interventions balance training;bed mobility training;gait training;neuromuscular re-education;ROM;strengthening;stretching;transfer training;home program guidelines;progressive activity/exercise   Clinical Impression   Criteria for Skilled Therapeutic Intervention yes, treatment indicated   PT Diagnosis difficulty ambulating   Influenced by the following impairments impaired " "balance, decreased activity tolerance, decreased strength, cognition   Functional limitations due to impairments difficulty with bed mobility, transfers, ambulation, stairs   Clinical Presentation Stable/Uncomplicated   Clinical Presentation Rationale clinical judgement   Clinical Decision Making (Complexity) Low complexity   Therapy Frequency 5x/week   Predicted Duration of Therapy Intervention (days/wks) 3 days   Anticipated Discharge Disposition Transitional Care Facility   Risk & Benefits of therapy have been explained Yes   Patient, Family & other staff in agreement with plan of care Yes  (further edu on TCU rec needed)   Clinical Impression Comments Patient will benefit from continued therapy in hospital & TCU to progress safety and independence with mobility, train balance & reduce risk of falls prior to returning home.   [facility] FTL SOLAR-PAC TM \"6 Clicks\"   2016, Trustees of [facility], under license to [employer].  All rights reserved.   6 Clicks Short Forms Basic Mobility Inpatient Short Form   [facility] AM-PAC  \"6 Clicks\" V.2 Basic Mobility Inpatient Short Form   1. Turning from your back to your side while in a flat bed without using bedrails? 4 - None   2. Moving from lying on your back to sitting on the side of a flat bed without using bedrails? 3 - A Little   3. Moving to and from a bed to a chair (including a wheelchair)? 3 - A Little   4. Standing up from a chair using your arms (e.g., wheelchair, or bedside chair)? 3 - A Little   5. To walk in hospital room? 3 - A Little   6. Climbing 3-5 steps with a railing? 3 - A Little   Basic Mobility Raw Score (Score out of 24.Lower scores equate to lower levels of function) 19   Total Evaluation Time   Total Evaluation Time (Minutes) 12     "

## 2020-07-19 NOTE — PROGRESS NOTES
07/19/20 1646   Quick Adds   Type of Visit Initial Occupational Therapy Evaluation   Living Environment   Lives With sibling(s)   Living Arrangements house   Transportation Anticipated family or friend will provide   Living Environment Comment Lives on one level; walk-in shower with shower chair and grab bars, standard toilet   Self-Care   Usual Activity Tolerance good   Current Activity Tolerance moderate   Equipment Currently Used at Home walker, standard   Activity/Exercise/Self-Care Comment Difficult to obtain PLOF; per chart has had multiple medical issues recently   Functional Level   Ambulation 1-->assistive equipment   Transferring 1-->assistive equipment   Toileting 0-->independent   Bathing 1-->assistive equipment   Dressing 0-->independent   Eating 0-->independent   Communication 0-->understands/communicates without difficulty   Swallowing 0-->swallows foods/liquids without difficulty   Fall history within last six months yes   Prior Functional Level Comment Difficult to obtain PLOF; per chart has had multiple medical issues recently   General Information   Onset of Illness/Injury or Date of Surgery - Date 07/17/20   Referring Physician Dr Vargas   Patient/Family Goals Statement return home to baseline   Additional Occupational Profile Info/Pertinent History of Current Problem This is a 59-year-old male with history of alcoholic liver cirrhosis, alcohol dependence, ulcerative colitis, peptic ulcer disease, hypertension, history of GI bleed in the past, history of esophageal varices in the past recent EGD negative for varices, history of alcohol withdrawal seizures, AML now in remission for more than 10 years, recently admitted to [place] from June 12 to June 25, 2020 with alcohol withdrawal seizures, acute respiratory failure requiring intubation mechanical ventilation, UTI with left ureteric stone requiring stent placement, bleeding per rectal neck respiratory failure because of withdrawal and  seizures.  He was discharged on June 25 to nursing home in stable condition.  Now the patient was transferred to [facility] emergency room from nursing home because of altered mental status, with worsening confusion and recurrent falls.  Patient found to have UTI and encephalopathy and subsequently transferred to [facility] for further management.   Precautions/Limitations fall precautions   General Observations On RA, alert   Cognitive Status Examination   Orientation person;time   Sensory Examination   Sensory Comments Baseline N/T in feet (5 months)   Pain Assessment   Patient Currently in Pain No   Mobility   Bed Mobility Bed mobility skill: Sit to supine;Bed mobility skill: Supine to sit   Bed Mobility Skill: Sit to Supine   Level of [place]: Sit/Supine stand-by assist   Bed Mobility Skill: Supine to Sit   Level of [place]: Supine/Sit stand-by assist   Transfer Skill: Bed to Chair/Chair to Bed   Level of [place]: Bed to Chair contact guard   Transfer Skill: Sit to Stand   Level of [place]: Sit/Stand stand-by assist   Toilet Transfer   Toilet Transfer Comments Declined   Tub/Shower Transfer   Tub/Shower Transfer Comments Declined   Upper Body Dressing   Level of [place]: Dress Upper Body other (see comments)  (declined)   Lower Body Dressing   Level of [place]: Dress Lower Body other (see comments)  (declined)   Toileting   Level of [place]: Toilet other (see comments)  (catheter )   Activities of Daily Living Analysis   Impairments Contributing to Impaired Activities of Daily Living cognition impaired;strength decreased   General Therapy Interventions   Planned Therapy Interventions ADL retraining   Clinical Impression   Criteria for Skilled Therapeutic Interventions Met yes, treatment indicated   OT Diagnosis impaired self-cares/mobility   Influenced by the following impairments weakness; cognition   Assessment of Occupational Performance 1-3 Performance  "Deficits   Identified Performance Deficits dressing, bathing, toileting   Clinical Decision Making (Complexity) Low complexity   Therapy Frequency Daily   Predicted Duration of Therapy Intervention (days/wks) 2-3 days   Anticipated Discharge Disposition Transitional Care Facility   Risks and Benefits of Treatment have been explained. Yes   Patient, Family & other staff in agreement with plan of care Yes   [facility] TM \"6 Clicks\"   2016, Trustees of [facility], under license to [employer].  All rights reserved.   6 Clicks Short Forms Daily Activity Inpatient Short Form   [facility]  \"6 Clicks\" Daily Activity Inpatient Short Form   1. Putting on and taking off regular lower body clothing? 3 - A Little   2. Bathing (including washing, rinsing, drying)? 3 - A Little   3. Toileting, which includes using toilet, bedpan or urinal? 3 - A Little   4. Putting on and taking off regular upper body clothing? 3 - A Little   5. Taking care of personal grooming such as brushing teeth? 4 - None   6. Eating meals? 4 - None   Daily Activity Raw Score (Score out of 24.Lower scores equate to lower levels of function) 20   Total Evaluation Time   Total Evaluation Time (Minutes) 8     "

## 2020-07-19 NOTE — PROGRESS NOTES
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  He was discharged on June 25 to nursing home in stable condition.  He was diagnosed with acute hepatic encephalopathy on July 15 with ammonia level of 101, started on rifaximin and lactulose.  There is noncompliant with his lactulose.    Now the patient was transferred to [facility] emergency room from nursing home because of altered mental status, with worsening confusion and recurrent falls.  Patient found to have UTI and encephalopathy and subsequently transferred to [facility] for further management.       Assessment & Plan        Acute metabolic encephalopathy most likely secondary UTI  With possible elements of hepatic encephalopathy as well.  UTI  Recently treated hepatic encephalopathy    This is a 59-year-old male with multiple comorbidities as mentioned above,  recently had a left ureteral stent placed on June 13 by urology while he was at the [facility].  UA consistent with UTI at this time.  Urine cultures obtained blood cultures obtained on admission, he was started on IV cefepime on admission.  His previous urine culture grows Enterobacter cloaca complex, but but now the urine culture is growing Enterococcus faecalis.    At this time I will discontinue the  cefepime, start him on IV Zosyn till we have the sensitivities.  Patient is clinically improving, and not encephalopathic at this time.  Is conscious alert and oriented x3 at this time.  I will continue his rifaximin and lactulose as well at this time.  His ammonia levels within normal limit on admission.  Consult PT and OT to evaluate him today.    Patient does have a staghorn stone to his left kidney, he have a stent in place.  We will consult urology to evaluate the patient, given his recurrent infection he is a candidate for surgery if urology agrees.  Will consult [facility] today.     Persistently elevated alkaline phosphatase  Alcoholic liver cirrhosis  Hx of Alcohol use disorder    Patient does have history of alcoholic cirrhosis and persistently elevated alkaline phosphatase.  He was evaluated by the GI while he was recently admitted.  MRCP was done without evidence of any biliary pathology, does have cholelithiasis.  He will need further assessment with EUS as an outpatient basis.  At this time there is no significant ascites on the CT scan of the abdomen done on admission.  No concern for SBP at this time.  Continue lactulose 30 mL 3 times a day, rifaximin, spironolactone and propranolol.  Patient is not drinking alcohol recently as he was and nursing home.  Excursion was on the higher side of admission, will hold the spironolactone at this time.       Rib fractures  - Tylenol 650 mg as needed  - Lidocaine patch daily  Will restart his gabapentin at a lower dose of 300 mg twice daily, given his renal failure  From today.     GENEVIEVE on CKD  Patient does have chronic kidney disease, with fluctuating baseline creatinine.  His baseline creatinine anywhere from 1.4-1.7.  He was discharged from the hospital on June 25 his creatinine was 1.49.  Admitted with creatinine of 1.92, patient looks quite dehydrated on admission, receive IV fluids..  Creatinine is improved, it is currently 1.78 this morning.  He has  no sign of any fluid retention.  We will hold the spironolactone at this time.  Discontinue IV fluids, avoid NSAIDs and nephrotoxic medication.       Acute on chronic macrocytic anemia- Combination of critical illness, liver disease, alcohol use, and recent blood loss.  Hemoglobin is overall stable at this time.  Slightly decreased today because of dilution and IV fluids.  No sign of active bleeding at this time  S/p hip replacement- stable  COPD- Continue baseline inhalers and PRN albuterol, not in acute exacerbation.  Hyperlipidemia- Continue home atorvastatin  Generalized anxiety disorder - Continue to hold bupropion; holding trazadone and hydroxyzine  Tobacco use - Continue nicotine patch  Gastritis- Continue protonix BID        Discussed with patient brother Jerry.  Son on the phone on 7/18/2020.  Discussed with the nursing staff taking care of the patient today.  Urine cultures growing Enterococcus faecalis, stop cefepime and start on IV Zosyn.  Follow urine culture sensitivities.  Consult urology to evaluate the patient.  Given his recurrent infection and left kidney stone.         DVT Prophylaxis: Pneumatic Compression Devices  Code Status: Full Code    Disposition: Expected discharge in 1 to 2 days if remains stable and have placement    Kole Vargas MD  Text Page  (7am - 6pm)    Interval History   Patient feeling well this morning, is awake and alert and oriented x3.  Denies any chest pain shortness breath orthopnea PND palpitation no fever chills or cough    No other significant event overnight    -Data reviewed today: I reviewed all new labs and imaging results over the last 24 hours. I personally reviewed no images or EKG's today.    Physical Exam   Temp: 98.9  F (37.2  C) Temp src: Oral BP: 109/60 Pulse: 66 Heart Rate: 59 Resp: 16 SpO2: 98 % O2 Device: None (Room air)    Vitals:    07/17/20 2128 07/18/20 0600 07/19/20 0506   Weight: 71.3 kg (157 lb 3.2 oz) 71.6 kg (157 lb 13.6 oz) 73.4 kg (161 lb 14.4  oz)     Vital Signs with Ranges  Temp:  [98.7  F (37.1  C)-99  F (37.2  C)] 98.9  F (37.2  C)  Pulse:  [58-67] 66  Heart Rate:  [57-68] 59  Resp:  [16] 16  BP: (109-143)/(55-67) 109/60  SpO2:  [96 %-99 %] 98 %  I/O last 3 completed shifts:  In: 480 [P.O.:480]  Out: 1900 [Urine:1900]    Constitutional: Awake alert in no acute distress  Eyes: Lids and lashes normal, pupils equal, round and reactive to light, extra ocular muscles intact, sclera clear, conjunctiva normal  Respiratory: No increased work of breathing, good air exchange, clear to auscultation bilaterally, no crackles or wheezing  Cardiovascular: Normal apical impulse, regular rate and rhythm, normal S1 and S2, no S3 or S4, and no murmur noted  GI: No scars, normal bowel sounds, soft, non-distended, non-tender, no masses palpated, no hepatosplenomegally  Skin: no bruising or bleeding, very dry skin.  Musculoskeletal: no lower extremity pitting edema present  Neurologic: No focal deficit    Medications       atorvastatin  20 mg Oral Daily     furosemide  20 mg Oral Daily     gabapentin  300 mg Oral BID     lactulose  30 mL Oral TID     montelukast  10 mg Oral At Bedtime     pantoprazole  40 mg Oral BID     piperacillin-tazobactam  3.375 g Intravenous Q6H     propranolol  20 mg Oral BID     rifaximin  550 mg Oral BID     sodium chloride (PF)  3 mL Intracatheter Q8H     [Held by provider] spironolactone  25 mg Oral Daily       Data   Recent Labs   Lab 07/19/20  0855 07/18/20  0841 07/17/20  1124   WBC 8.6 11.3* 11.1*   HGB 8.4* 9.0* 8.8*   MCV 93 94 95    197 200   INR  --   --  1.48*    133 139   POTASSIUM 3.9 4.4 4.6   CHLORIDE 112* 107 110*   CO2 21 20 23   BUN 15 17 21   CR 1.78* 1.76* 1.92*   ANIONGAP 5 6 6   BEE 8.7 8.9 9.4   * 88 85   ALBUMIN 2.5* 2.8* 2.9*   PROTTOTAL  --  6.7* 6.9   BILITOTAL  --  0.8 0.8   ALKPHOS  --  431* 507*   ALT  --  25 28   AST  --  34 35       No results found for this or any previous visit (from the past  24 hour(s)).

## 2020-07-19 NOTE — PLAN OF CARE
"Discharge Planner PT   Patient plan for discharge: \"I don't know\"  Current status: PT orders received, eval completed, treatment initiated. Pt admitted 7/17 from TCU with AMS, found to have UTI. Pt did have recent hospitalization with discharge to TCU 6/25. Pt also with recent L hip replacement late May/early June of this year. At baseline, pt reports he lives in 1 level house with sister with 3 steps to enter with rail support. Pt unclear in describing AD use, first states he uses no AD in the house and cane outside the house, later states he uses 4WW \"pretty much everywhere.\" Per chart, pt had 5 falls  at TCU in 2-3 days prior to admission.    Currently, pt received supine in bed, agreeable to PT. Performed supine>sit with Yahaira, pt reaching out to PT for support. Sit>stand with FWW and CGA. Ambulated 2x 120' with FWW and CGA-close SBA with seated rest between. Ambulated 8' in room without AD with CGA-Yahaira, reaching for support of wall/doorway/bed. Performed 5x sit<>stand from chair with arms with CGA, unable to stand from chair with arms crossed across chest without UE pushoff. Initiated balance training with modified Rhomberg stance- held 20\" before stepping out and with slow marching without UE support x1'. Pt seated in chair upon departure, all needs in reach, RN updated.    Barriers to return to prior living situation: none to return to TCU; barriers to discharge home include fall risk, impaired balance, current level of assist  Recommendations for discharge: TCU  Rationale for recommendations: Patient will benefit from continued therapy in hospital & TCU to progress safety and independence with mobility, train balance & reduce risk of falls prior to returning home.       Entered by: Carmina Jackson 07/19/2020 11:05 AM       "

## 2020-07-19 NOTE — PLAN OF CARE
A/OX3,forgetful at times.Denies pain,cms intact.Up with 1 assist/walker.Had BM X1 on my shift.Chong in place with good output.On IV antibiotics,will continue to monitor,TCU placement.

## 2020-07-19 NOTE — PLAN OF CARE
DATE & TIME: 7/19/20 [id number]    Cognitive Concerns/ Orientation : A&Ox3, disoriented to time. Calm and cooperative    BEHAVIOR & AGGRESSION TOOL COLOR: green   CIWA SCORE: N/a   ABNL VS/O2: VSS on RA  MOBILITY: Up Ax1 w/ GB and walker  PAIN MANAGMENT: Denies, tylenol available   DIET: Regular   BOWEL/BLADDER: Chong in place. No BM this shift  ABNL LAB/BG: crea 1.78  DRAIN/DEVICES: Chong, PIV SL  TELEMETRY RHYTHM: N/a  SKIN: dry/flaky, scattered scabbing   TESTS/PROCEDURES: none   D/C DAY/GOALS/PLACE: pending placement. Recommend TCU   OTHER IMPORTANT INFO: neuros intact. Urology consulted. PT/OT following   MD/RN ROUNDING SIGNED OFF D/E SHIFT: yes   COMMIT TO SIT DONE AND SIGNED OFF yes

## 2020-07-19 NOTE — PROGRESS NOTES
SW:  Orders for PT noted.  Even though patient came from a TCU because  patient will be assessed by the [facility] they will want an assessment completed here.  Referral made to [facility] and [facility] per sister's request.  Also have place a called to brother Jerry, who tells Dr Vargas, he has a TCU bed on hold.  Writer wants to inform the brother, he will not be able to visit patient for the first two weeks and after that the visits are on a scheduled basis in the facility yard or patio area.  Referral faxed to Arlene the w/e liaison for [facility] and also sent electronically to the two facilities listed above.

## 2020-07-19 NOTE — PLAN OF CARE
DATE & TIME: 7/18 from 1900 to 0730    Cognitive Concerns/ Orientation : Alert and oriented x 3, disoriented to time. Forgetful at times.   BEHAVIOR & AGGRESSION TOOL COLOR: Green  CIWA SCORE: N/A   ABNL VS/O2: VSS on RA  MOBILITY Up x 1 assist with walker/GB, able to turn and reposition but needs reminder to reposition self.   PAIN MANAGMENT: c/o headache, given Tylenol x 1  DIET:  Regular  BOWEL/BLADDER: Chong in placed, Had medium  BM during this shift. On lactulose.   ABNL LAB/BG: WBC 11.3, Hgb 9.0, Cr 1.76, albumin 2.8, Bilirubin 0.4, alkaline phosphate 431  DRAIN/DEVICES: IVF infusing at 75 mL/hr  TELEMETRY RHYTHM: N/A  SKIN: Scattered scabs. Skin is dry/aczema on the face   TESTS/PROCEDURES: COVID negative  D/C DAY/GOALS/PLACE: Possible discharge 1-2 days if remains stable.   OTHER IMPORTANT INFO: Neuro intact,   MD/RN ROUNDING SIGNED OFF D/E SHIFT: Done in AM  COMMIT TO SIT DONE AND SIGNED OFF Yes

## 2020-07-20 ENCOUNTER — APPOINTMENT (OUTPATIENT)
Dept: OCCUPATIONAL THERAPY | Facility: CLINIC | Age: 60
End: 2020-07-20
Attending: HOSPITALIST
Payer: COMMERCIAL

## 2020-07-20 LAB
ALBUMIN SERPL-MCNC: 2.7 G/DL (ref 3.4–5)
ANION GAP SERPL CALCULATED.3IONS-SCNC: 11 MMOL/L (ref 3–14)
BASOPHILS # BLD AUTO: 0.1 10E9/L (ref 0–0.2)
BASOPHILS NFR BLD AUTO: 0.8 %
BUN SERPL-MCNC: 14 MG/DL (ref 7–30)
CALCIUM SERPL-MCNC: 8.7 MG/DL (ref 8.5–10.1)
CHLORIDE SERPL-SCNC: 110 MMOL/L (ref 94–109)
CO2 SERPL-SCNC: 16 MMOL/L (ref 20–32)
CREAT SERPL-MCNC: 1.74 MG/DL (ref 0.66–1.25)
DIFFERENTIAL METHOD BLD: ABNORMAL
EOSINOPHIL # BLD AUTO: 0.5 10E9/L (ref 0–0.7)
EOSINOPHIL NFR BLD AUTO: 5.9 %
ERYTHROCYTE [DISTWIDTH] IN BLOOD BY AUTOMATED COUNT: 19.6 % (ref 10–15)
GFR SERPL CREATININE-BSD FRML MDRD: 41 ML/MIN/{1.73_M2}
GLUCOSE SERPL-MCNC: 156 MG/DL (ref 70–99)
HCT VFR BLD AUTO: 29.2 % (ref 40–53)
HGB BLD-MCNC: 8.9 G/DL (ref 13.3–17.7)
IMM GRANULOCYTES # BLD: 0 10E9/L (ref 0–0.4)
IMM GRANULOCYTES NFR BLD: 0.3 %
LYMPHOCYTES # BLD AUTO: 1.4 10E9/L (ref 0.8–5.3)
LYMPHOCYTES NFR BLD AUTO: 17.2 %
MCH RBC QN AUTO: 28.5 PG (ref 26.5–33)
MCHC RBC AUTO-ENTMCNC: 30.5 G/DL (ref 31.5–36.5)
MCV RBC AUTO: 94 FL (ref 78–100)
MONOCYTES # BLD AUTO: 0.7 10E9/L (ref 0–1.3)
MONOCYTES NFR BLD AUTO: 9.2 %
NEUTROPHILS # BLD AUTO: 5.3 10E9/L (ref 1.6–8.3)
NEUTROPHILS NFR BLD AUTO: 66.6 %
NRBC # BLD AUTO: 0 10*3/UL
NRBC BLD AUTO-RTO: 0 /100
PHOSPHATE SERPL-MCNC: 2.7 MG/DL (ref 2.5–4.5)
PLATELET # BLD AUTO: 156 10E9/L (ref 150–450)
POTASSIUM SERPL-SCNC: 3.5 MMOL/L (ref 3.4–5.3)
RBC # BLD AUTO: 3.12 10E12/L (ref 4.4–5.9)
SODIUM SERPL-SCNC: 137 MMOL/L (ref 133–144)
WBC # BLD AUTO: 7.9 10E9/L (ref 4–11)

## 2020-07-20 PROCEDURE — 25000132 ZZH RX MED GY IP 250 OP 250 PS 637: Performed by: HOSPITALIST

## 2020-07-20 PROCEDURE — 25000128 H RX IP 250 OP 636: Performed by: INTERNAL MEDICINE

## 2020-07-20 PROCEDURE — 25000132 ZZH RX MED GY IP 250 OP 250 PS 637: Performed by: INTERNAL MEDICINE

## 2020-07-20 PROCEDURE — 12000000 ZZH R&B MED SURG/OB

## 2020-07-20 PROCEDURE — 97535 SELF CARE MNGMENT TRAINING: CPT | Mod: GO | Performed by: OCCUPATIONAL THERAPIST

## 2020-07-20 PROCEDURE — 25000132 ZZH RX MED GY IP 250 OP 250 PS 637: Performed by: STUDENT IN AN ORGANIZED HEALTH CARE EDUCATION/TRAINING PROGRAM

## 2020-07-20 PROCEDURE — 36415 COLL VENOUS BLD VENIPUNCTURE: CPT | Performed by: INTERNAL MEDICINE

## 2020-07-20 PROCEDURE — 99232 SBSQ HOSP IP/OBS MODERATE 35: CPT | Performed by: INTERNAL MEDICINE

## 2020-07-20 PROCEDURE — 85025 COMPLETE CBC W/AUTO DIFF WBC: CPT | Performed by: INTERNAL MEDICINE

## 2020-07-20 PROCEDURE — 80069 RENAL FUNCTION PANEL: CPT | Performed by: INTERNAL MEDICINE

## 2020-07-20 RX ORDER — AMOXICILLIN 500 MG/1
500 CAPSULE ORAL EVERY 8 HOURS SCHEDULED
Status: DISCONTINUED | OUTPATIENT
Start: 2020-07-20 | End: 2020-07-20

## 2020-07-20 RX ORDER — SPIRONOLACTONE 25 MG/1
25 TABLET ORAL DAILY
Status: DISCONTINUED | OUTPATIENT
Start: 2020-07-20 | End: 2020-07-20

## 2020-07-20 RX ORDER — AMOXICILLIN 875 MG
875 TABLET ORAL EVERY 12 HOURS SCHEDULED
Status: DISCONTINUED | OUTPATIENT
Start: 2020-07-20 | End: 2020-07-21 | Stop reason: HOSPADM

## 2020-07-20 RX ORDER — AMOXICILLIN 250 MG
1-2 CAPSULE ORAL DAILY PRN
Status: DISCONTINUED | OUTPATIENT
Start: 2020-07-20 | End: 2020-07-21 | Stop reason: HOSPADM

## 2020-07-20 RX ORDER — SPIRONOLACTONE 25 MG/1
25 TABLET ORAL DAILY
Status: DISCONTINUED | OUTPATIENT
Start: 2020-07-20 | End: 2020-07-21 | Stop reason: HOSPADM

## 2020-07-20 RX ADMIN — AMOXICILLIN 875 MG: 875 TABLET, FILM COATED ORAL at 23:27

## 2020-07-20 RX ADMIN — LACTULOSE 30 ML: 10 SOLUTION ORAL at 08:15

## 2020-07-20 RX ADMIN — LACTULOSE 30 ML: 10 SOLUTION ORAL at 22:12

## 2020-07-20 RX ADMIN — FLUTICASONE PROPIONATE AND SALMETEROL 1 PUFF: 50; 500 POWDER RESPIRATORY (INHALATION) at 14:23

## 2020-07-20 RX ADMIN — RIFAXIMIN 550 MG: 550 TABLET ORAL at 08:13

## 2020-07-20 RX ADMIN — LACTULOSE 30 ML: 10 SOLUTION ORAL at 16:26

## 2020-07-20 RX ADMIN — RIFAXIMIN 550 MG: 550 TABLET ORAL at 22:08

## 2020-07-20 RX ADMIN — ATORVASTATIN CALCIUM 20 MG: 10 TABLET, FILM COATED ORAL at 08:13

## 2020-07-20 RX ADMIN — GABAPENTIN 300 MG: 300 CAPSULE ORAL at 08:13

## 2020-07-20 RX ADMIN — PIPERACILLIN SODIUM AND TAZOBACTAM SODIUM 3.38 G: 3; .375 INJECTION, POWDER, LYOPHILIZED, FOR SOLUTION INTRAVENOUS at 01:29

## 2020-07-20 RX ADMIN — PANTOPRAZOLE SODIUM 40 MG: 40 TABLET, DELAYED RELEASE ORAL at 22:08

## 2020-07-20 RX ADMIN — MONTELUKAST 10 MG: 10 TABLET, FILM COATED ORAL at 22:08

## 2020-07-20 RX ADMIN — GABAPENTIN 300 MG: 300 CAPSULE ORAL at 22:09

## 2020-07-20 RX ADMIN — TRAZODONE HYDROCHLORIDE 50 MG: 50 TABLET ORAL at 22:10

## 2020-07-20 RX ADMIN — PANTOPRAZOLE SODIUM 40 MG: 40 TABLET, DELAYED RELEASE ORAL at 08:14

## 2020-07-20 RX ADMIN — PIPERACILLIN SODIUM AND TAZOBACTAM SODIUM 3.38 G: 3; .375 INJECTION, POWDER, LYOPHILIZED, FOR SOLUTION INTRAVENOUS at 07:31

## 2020-07-20 RX ADMIN — PROPRANOLOL HYDROCHLORIDE 20 MG: 20 TABLET ORAL at 22:10

## 2020-07-20 RX ADMIN — PROPRANOLOL HYDROCHLORIDE 20 MG: 20 TABLET ORAL at 08:13

## 2020-07-20 RX ADMIN — FUROSEMIDE 20 MG: 20 TABLET ORAL at 08:13

## 2020-07-20 RX ADMIN — FLUTICASONE PROPIONATE AND SALMETEROL 1 PUFF: 50; 500 POWDER RESPIRATORY (INHALATION) at 22:14

## 2020-07-20 RX ADMIN — ACETAMINOPHEN 650 MG: 325 TABLET, FILM COATED ORAL at 22:10

## 2020-07-20 RX ADMIN — SPIRONOLACTONE 25 MG: 25 TABLET ORAL at 16:27

## 2020-07-20 RX ADMIN — AMOXICILLIN 875 MG: 875 TABLET, FILM COATED ORAL at 14:24

## 2020-07-20 ASSESSMENT — ACTIVITIES OF DAILY LIVING (ADL)
ADLS_ACUITY_SCORE: 20
ADLS_ACUITY_SCORE: 19
ADLS_ACUITY_SCORE: 19
ADLS_ACUITY_SCORE: 20
ADLS_ACUITY_SCORE: 19
ADLS_ACUITY_SCORE: 19

## 2020-07-20 NOTE — PLAN OF CARE
DATE & TIME: 7/19 from 1900 to 0730    Cognitive Concerns/ Orientation : A&O x 3, forgetful   BEHAVIOR & AGGRESSION TOOL COLOR: Green  CIWA SCORE: N/A   ABNL VS/O2: VSS on RA  MOBILITY: Up x 1 assist to bathroom with walker/GB  PAIN MANAGMENT: c/o headache, given Tylenol x 1  DIET: Regular  BOWEL/BLADDER: Chong in placed, Continent, had medium BM during this shift  ABNL LAB/BG: Cr 1.78  DRAIN/DEVICES: PIV saline lock, receiving Intermittent IV abx  TELEMETRY RHYTHM: N/A  SKIN: Dry/flaky scattered scabing. Blanchable redness on coccyx area.   TESTS/PROCEDURES: N/A  D/C DAY/GOALS/PLACE: Discharge 1-2 days to TCU pending in creatinine imrpovemet  OTHER IMPORTANT INFO: Neuro intact, Urology consulted. PT/OT following  MD/RN ROUNDING SIGNED OFF D/E SHIFT: Done in AM  COMMIT TO SIT DONE AND SIGNED OFF Yes

## 2020-07-20 NOTE — PLAN OF CARE
"Discharge Planner OT   Patient plan for discharge: TCU \"after I go home and do some paperwork and pay bills\".  Patient reports he will most likely go to live with brother but \"need to get things in order first\".    Current status: Patient alert and pleasant, appears SOB and \"breathy\" with any talking.  Nursing checked O2 prior to session with O2 over 90% on RA.  Patient demonstrated bed mobility, LB dressing and toilet task with SBA/CGA.  Patient participated in SLUMS and scored 17/30 (score of 26 and above indicates normal cognition).  Barriers to return to prior living situation: Cognition  Recommendations for discharge: TCU, may need higher level of care for long term due to cognition.  Patient should have supervision for ADLs due to fall risk and assist with higher level IADLs such meal prep, medication management, finances and community management.  Rationale for recommendations: Patient is below baseline with multiple medical issues/prolonged hospitalization; would benefit from continued therapy for increased independence in ADLs/mobility.       Entered by: Miriam Dorsey 07/20/2020 4:27 PM       "

## 2020-07-20 NOTE — PLAN OF CARE
DATE & TIME: 7- AM shift                  Cognitive Concerns/ Orientation : A&O x 3, forgetful; disoriented to place  BEHAVIOR & AGGRESSION TOOL COLOR: Green  CIWA SCORE: N/A   ABNL VS/O2: VSS on RA  MOBILITY: Up x 1 assist to bathroom with walker/GB  PAIN MANAGMENT: denies  DIET: Regular. Tolerating his diet. Denies nausea  BOWEL/BLADDER: Chong removed at 12, pt was able to urinate. C/o burning sensation with urination. Continues to have loose/watery BMs, pt is on lactulose. BM x 3 this shift.  ABNL LAB/BG: Cr 1.74. Hg 8.9  DRAIN/DEVICES: PIV saline lock  TELEMETRY RHYTHM: N/A  SKIN: Dry/flaky scattered scabing. Blanchable redness on coccyx area.   TESTS/PROCEDURES: N/A  D/C DAY/GOALS/PLACE: Discharge possibly tomorrow to TCU. PT/OT are following. SW is following. Pt was switched to oral antibiotic, Amoxicillin. Pt will restart aldactone and Cr checked tomorrow   OTHER IMPORTANT INFO: Neuro intact, except numbness to his carley LE (baseline per pt) and cognition.   Urology signed off.    MD/RN ROUNDING SIGNED OFF D/E SHIFT: Yes  COMMIT TO SIT DONE AND SIGNED OFF Yes

## 2020-07-20 NOTE — PROGRESS NOTES
[facility]    Medicine Progress Note - Hospitalist Service       Date of Admission:  7/17/2020  Assessment & Plan    Abhijeet Mccauley is a 59 year old male admitted on 7/17/2020. He presented with AMS. He has a history of alcohol use disorder, alcohol withdrawal seizures, EtOH cirrhosis, COPD, CKD III, AML (diagnosed 2008, s/p chemotherapy, complete remission >10 years), and s/p left hip replacement.    Patient was transferred to [facility] emergency room from nursing home because of altered mental status, with worsening confusion and recurrent falls.  Patient found to have UTI and encephalopathy and subsequently transferred to [facility] for further management.    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 Lino. UA consistent with UTI. Urine cultures obtained blood cultures obtained on admission, growing Enterococcus faecalis. His previous urine culture grows Enterobacter cloaca.     - Cefepime started at admission > changed to zosyn   - Enterococcus, pan sensitive. Afebrile, hemodynamically stable > change to amoxicillin 500 mg TID (Consulted pharmacy re: changing this to 875 mg BID. Not allowed in Epic)   - Urology consulted and no inpatient intervention recommended.   - He had follow-up scheduled with Dr. Galeana on 7/21/2020 and this will need to be rescheduled.   - Chong should be removed with voiding trial today 07/20/20. Discussed with RN.     Metabolic encephalopathy, resolving - Suspect secondary to UTI and complicated by liver cirrhosis with possible component of hepatic encephalopathy  CT head was negative,  Encephalopathy most likely multifactorial in the setting of infection.     - Treat potential UTI as above and tx of hepatic encephalopathy as below.   - Avoid sedating medications.      Cirrhosis with ascites with elevated alkaline phosphatase, nl bili, plts. INR 1.48.   Moderate to  large ascites   Hx of Alcohol use disorder  Assessment: previous ultrasound demonstrated cirrhosis changes. MRCP done in 06/2020  without evidence of biliary pathology to account for the elevated alkaline phosphatase. Reported history of esophageal varices, but none documented on last EGD in February.   Plan:  - Continue lactulose 30 ML TID with frequent stooling. Titrate to ~ 3 stools per day.   - Continue rifampin 550 BID  - Continue furosemide  - Spironolactone held due to GENEVIEVE. Resume on 07/20/20 and follow Cr.   - Continue propranalol    NAG metabolic acidosis - Noted on 07/20/20 (bicarbonate 16) Possibly from diarrhea with lactulose. Multiple stools noted. Also CKD may play a role.   - hold lactulose for 3 ore more stools per day.   - Recheck in AM.      Rib fractures  - Tylenol 650 mg as needed  - Lidocaine patch daily  - Continue PTA gabapentin 300 mg BID.   - PT and OT     CKD baseline creatinine appears to be ~1.7  Recent Labs   Lab 07/19/20  0855 07/18/20  0841 07/17/20  1124   CR 1.78* 1.76* 1.92*        CHRONIC   Acute on chronic macrocytic anemia- Combination of critical illness, liver disease, alcohol use, and recent blood loss.  S/p hip replacement- stable  COPD- Continue baseline inhalers and PRN albuterol  Hyperlipidemia- Continue home atorvastatin  Generalized anxiety disorder - Continue to hold bupropion; holding trazadone and hydroxyzine  Tobacco use - Continue nicotine patch  Gastritis- Continue protonix BID         Diet: Regular Diet Adult    DVT Prophylaxis: Pneumatic Compression Devices  Chong Catheter: in place, indication: Retention  Code Status: Full Code           Disposition Plan   Expected discharge: Tomorrow, recommended to transitional care unit once change to PO abx. Resumed spironolactone with stable Cr. Voiding trial and placement found. .  Entered: Anabelle Mclaughlin MD 07/20/2020, 8:51 AM       The patient's care was discussed with the Bedside Nurse and Patient.    Anabelle Mclaughlin  MD  Hospitalist Service  [facility]    ______________________________________________________________________    Interval History   Events over last 24 hours as outlined above. Patient denies any SOB, CP, abdominal pain, N/V/D.   Patient is currently oriented to being in hospital, situation and answers questions appropriately but does have difficulty answering which hospital and events leading to this stay.     Data reviewed today: I reviewed all medications, new labs and imaging results over the last 24 hours. I personally reviewed no images or EKG's today.    Physical Exam   Vital Signs: Temp: 98.2  F (36.8  C) Temp src: Oral BP: 111/57 Pulse: 63 Heart Rate: 64 Resp: 18 SpO2: 97 % O2 Device: None (Room air)    Weight: 156 lbs 0 oz  Constitutional:  NAD,   Neuropsyche:  alert and oriented to situation, hospital, time of day, but has trouble with the name of the hospital and events leading to his stay, answers questions appropriately.   Respiratory:  Breathing comfortably, no audible wheezing.   Cardiovascular:  Regular rate and rhythm, no edema.  GI:  soft, NT/ND, BS normal  Skin/Integumen:  No acute rash, bruising or sign of bleeding.       Data   Recent Labs   Lab 07/20/20  0845 07/19/20  0855 07/18/20  0841 07/17/20  1124   WBC 7.9 8.6 11.3* 11.1*   HGB 8.9* 8.4* 9.0* 8.8*   MCV 94 93 94 95    157 197 200   INR  --   --   --  1.48*    138 133 139   POTASSIUM 3.5 3.9 4.4 4.6   CHLORIDE 110* 112* 107 110*   CO2 16* 21 20 23   BUN 14 15 17 21   CR 1.74* 1.78* 1.76* 1.92*   ANIONGAP 11 5 6 6   BEE 8.7 8.7 8.9 9.4   * 114* 88 85   ALBUMIN 2.7* 2.5* 2.8* 2.9*   PROTTOTAL  --   --  6.7* 6.9   BILITOTAL  --   --  0.8 0.8   ALKPHOS  --   --  431* 507*   ALT  --   --  25 28   AST  --   --  34 35     No results found for this or any previous visit (from the past 24 hour(s)).  Medications       amoxicillin  875 mg Oral Q12H ANGEL     atorvastatin  20 mg Oral Daily      fluticasone-salmeterol  1 puff Inhalation BID     furosemide  20 mg Oral Daily     gabapentin  300 mg Oral BID     lactulose  30 mL Oral TID     montelukast  10 mg Oral At Bedtime     pantoprazole  40 mg Oral BID     propranolol  20 mg Oral BID     rifaximin  550 mg Oral BID     sodium chloride (PF)  3 mL Intracatheter Q8H     spironolactone  25 mg Oral Daily

## 2020-07-20 NOTE — PROGRESS NOTES
SW: Following for discharge planning    D/I: Received call from pt's brother, Zoran @ [phone].  This writer returned call and provided update on facility referrals.    P: Will continue to follow.    Breana Bunch MA, MSW, [facility]  [phone]

## 2020-07-21 ENCOUNTER — APPOINTMENT (OUTPATIENT)
Dept: OCCUPATIONAL THERAPY | Facility: CLINIC | Age: 60
End: 2020-07-21
Attending: HOSPITALIST
Payer: COMMERCIAL

## 2020-07-21 ENCOUNTER — TELEPHONE (OUTPATIENT)
Dept: GASTROENTEROLOGY | Facility: CLINIC | Age: 60
End: 2020-07-21

## 2020-07-21 VITALS
RESPIRATION RATE: 18 BRPM | OXYGEN SATURATION: 94 % | DIASTOLIC BLOOD PRESSURE: 64 MMHG | TEMPERATURE: 98.4 F | HEART RATE: 67 BPM | BODY MASS INDEX: 24.84 KG/M2 | SYSTOLIC BLOOD PRESSURE: 119 MMHG | WEIGHT: 153.9 LBS

## 2020-07-21 LAB
ANION GAP SERPL CALCULATED.3IONS-SCNC: 9 MMOL/L (ref 3–14)
BUN SERPL-MCNC: 14 MG/DL (ref 7–30)
CALCIUM SERPL-MCNC: 8.6 MG/DL (ref 8.5–10.1)
CHLORIDE SERPL-SCNC: 110 MMOL/L (ref 94–109)
CO2 SERPL-SCNC: 18 MMOL/L (ref 20–32)
CREAT SERPL-MCNC: 1.67 MG/DL (ref 0.66–1.25)
GFR SERPL CREATININE-BSD FRML MDRD: 43 ML/MIN/{1.73_M2}
GLUCOSE SERPL-MCNC: 88 MG/DL (ref 70–99)
POTASSIUM SERPL-SCNC: 3.5 MMOL/L (ref 3.4–5.3)
SODIUM SERPL-SCNC: 137 MMOL/L (ref 133–144)

## 2020-07-21 PROCEDURE — 25000132 ZZH RX MED GY IP 250 OP 250 PS 637: Performed by: INTERNAL MEDICINE

## 2020-07-21 PROCEDURE — 25000132 ZZH RX MED GY IP 250 OP 250 PS 637: Performed by: HOSPITALIST

## 2020-07-21 PROCEDURE — 25000132 ZZH RX MED GY IP 250 OP 250 PS 637: Performed by: STUDENT IN AN ORGANIZED HEALTH CARE EDUCATION/TRAINING PROGRAM

## 2020-07-21 PROCEDURE — 99239 HOSP IP/OBS DSCHRG MGMT >30: CPT | Performed by: INTERNAL MEDICINE

## 2020-07-21 PROCEDURE — 36415 COLL VENOUS BLD VENIPUNCTURE: CPT | Performed by: INTERNAL MEDICINE

## 2020-07-21 PROCEDURE — 97535 SELF CARE MNGMENT TRAINING: CPT | Mod: GO

## 2020-07-21 PROCEDURE — 80048 BASIC METABOLIC PNL TOTAL CA: CPT | Performed by: INTERNAL MEDICINE

## 2020-07-21 RX ORDER — ACETAMINOPHEN 325 MG/1
650 TABLET ORAL 3 TIMES DAILY
DISCHARGE
Start: 2020-07-21 | End: 2020-07-21

## 2020-07-21 RX ORDER — ACETAMINOPHEN 325 MG/1
650 TABLET ORAL 4 TIMES DAILY PRN
DISCHARGE
Start: 2020-07-21 | End: 2020-07-21

## 2020-07-21 RX ORDER — LACTULOSE 20 G/30ML
30 SOLUTION ORAL 3 TIMES DAILY
Status: ON HOLD | DISCHARGE
Start: 2020-07-21 | End: 2020-08-29

## 2020-07-21 RX ORDER — ACETAMINOPHEN 325 MG/1
650 TABLET ORAL 3 TIMES DAILY PRN
Status: ON HOLD | DISCHARGE
Start: 2020-07-21 | End: 2021-04-30

## 2020-07-21 RX ORDER — AMOXICILLIN 875 MG
875 TABLET ORAL EVERY 12 HOURS
DISCHARGE
Start: 2020-07-21 | End: 2020-08-13

## 2020-07-21 RX ORDER — IBUPROFEN 400 MG/1
400 TABLET, FILM COATED ORAL DAILY PRN
Status: ON HOLD | DISCHARGE
Start: 2020-07-21 | End: 2020-08-29

## 2020-07-21 RX ADMIN — FUROSEMIDE 20 MG: 20 TABLET ORAL at 08:44

## 2020-07-21 RX ADMIN — GABAPENTIN 300 MG: 300 CAPSULE ORAL at 08:44

## 2020-07-21 RX ADMIN — PANTOPRAZOLE SODIUM 40 MG: 40 TABLET, DELAYED RELEASE ORAL at 08:44

## 2020-07-21 RX ADMIN — ACETAMINOPHEN 650 MG: 325 TABLET, FILM COATED ORAL at 08:49

## 2020-07-21 RX ADMIN — PROPRANOLOL HYDROCHLORIDE 20 MG: 20 TABLET ORAL at 08:44

## 2020-07-21 RX ADMIN — RIFAXIMIN 550 MG: 550 TABLET ORAL at 08:44

## 2020-07-21 RX ADMIN — LACTULOSE 30 ML: 10 SOLUTION ORAL at 08:46

## 2020-07-21 RX ADMIN — FLUTICASONE PROPIONATE AND SALMETEROL 1 PUFF: 50; 500 POWDER RESPIRATORY (INHALATION) at 08:43

## 2020-07-21 RX ADMIN — SPIRONOLACTONE 25 MG: 25 TABLET ORAL at 08:44

## 2020-07-21 RX ADMIN — AMOXICILLIN 875 MG: 875 TABLET, FILM COATED ORAL at 08:44

## 2020-07-21 RX ADMIN — ATORVASTATIN CALCIUM 20 MG: 10 TABLET, FILM COATED ORAL at 08:44

## 2020-07-21 ASSESSMENT — ACTIVITIES OF DAILY LIVING (ADL)
ADLS_ACUITY_SCORE: 19

## 2020-07-21 NOTE — PROGRESS NOTES
SW  Final Discharge Planning note  Patient has formal discharge orders which have been sent to [facility]. Current transportation time reviewed with their liaison,pt, bother and  bedside nurse. [facility] will call writer if they have a cancellation and can come earlier.   No PAS is needed as patient the previous PAS completed for [facility] is still valid.   regarding call back for lab, medication change and instructions.

## 2020-07-21 NOTE — TELEPHONE ENCOUNTER
[facility] Call Center    Phone Message    May a detailed message be left on voicemail: yes     Reason for Call: Other: Dr. Anabelle Mclaughlin request patient have a follow up with GI in 2 weeks from 7/21/20.  Writer unable to find anything in time frame.  Please call patient to schedule      Action Taken: Message routed to:  Adult Clinics: Gastroenterology (GI) p [id number]    Travel Screening: Not Applicable

## 2020-07-21 NOTE — PLAN OF CARE
Discharge Planner OT   Patient plan for discharge: TCU today  Current status: SBA for bed mob, transfers. SBA-CGA for mobility using 2WW. Completed toileting, LB dressing, oral-care, hair-care, and FB dressing standing at sink w/ spv-SBA. Had 2 mild LOB but self-corrected. Pt tolerated activity well; stood >16 min at sink.  Barriers to return to prior living situation: dec cognition and strength, dec higher level balance  Recommendations for discharge: TCU  Rationale for recommendations: Patient would benefit from further therapy for increased independence in ADLs/mobility and safe discharge planning.       Entered by: Nicky Hay 07/21/2020 11:34 AM     Occupational Therapy Discharge Summary    Reason for therapy discharge:    Discharged to transitional care facility.    Progress towards therapy goal(s). See goals on Care Plan in [facility] electronic health record for goal details.  Goals partially met.  Barriers to achieving goals:   discharge from facility.    Therapy recommendation(s):    Continued therapy is recommended.  Rationale/Recommendations:  at TCU to maximize ind/safety w/ ADL's and IADL's.       Attending Only

## 2020-07-21 NOTE — DISCHARGE INSTRUCTIONS
Gastroenterology Clinic [phone] will call you  on you cell in couple days to set up an appointment    [facility]  [phone] will call you to set up an appointment  in couple days.      If you have not heard from them please give them a call.

## 2020-07-21 NOTE — PROGRESS NOTES
Discharged with [facility] transport. Denies pain at discharge. Packet assembled with day shift RN. All belongings with pt when room stripped. To TCU.

## 2020-07-21 NOTE — LETTER
July 29, 2020      Abhijeet Mccauley  [address] [place] [id number]              Dear Abhijeet,    Please call to schedule a follow-up appointment with Hepatology at [phone]. This will help to provide continuing care since your hospitalization.      Sincerely,      Chelsea Ortiz RN  Gastroenterology Care Coordinator  [place], [place]

## 2020-07-21 NOTE — PROGRESS NOTES
"SW:  D:    Note discharge pending creatinine level.   Call placed to Clyde Ivan [phone],, for the two TCU's which brother has requested.  P:  Will update chart as soon as writer hears back from Clyde catalan.    Addendum:    [place] back from [place].   Mar[facility] offered a [facility],.  Writer spoke with brother Jerry.  He explains his strong preference is [facility] TCU as patient was there 1&1/2 years ago and \"they treated him so well\" and brother can also walk to the facility.  \"I\"ve been praying they have a vacancy\".  Writer called lianahum back.  She will check into [facility] available again and get back to writer.  Reviewed the following with brother;  visiting restrictions of all TCU's and that all cares are provide to patient in his room for the first 14 days, hospital will arrange transportation and the need to drop off clothing to the TCU.     Addendum at 1010  [facility]  Writer has updated bedside nurse and paging Dr Mclaughlin.  Writer met with patient.  Reviewed his brother and sister's request that instead of returning to [facility] that he transfer to [facility].  When asked if he is okay with this plan, he responded yes and smiled.  He did not not have any questions.  Left message on brother Jerry's voice mail.  "

## 2020-07-21 NOTE — DISCHARGE SUMMARY
[facility]  Hospitalist Discharge Summary      Date of Admission:  7/17/2020  Date of Discharge:  7/21/2020  Discharging Provider: Anabelle Mclaughlin MD      Discharge Diagnoses   Urinary tract infection  Partial staghorn kidney stone of left upper pole  S/p L ureteral stent placement on 6/13 by Dr. Lino.  Metabolic encephalopathy, resolved  Cirrhosis with ascites   Moderate to large ascites   Hx of Alcohol use disorder  NAG metabolic acidosis, RESOLVING   Rib fractures  CKD   Acute on chronic macrocytic anemia-   S/p hip replacement   COPD   Hyperlipidemia   Generalized anxiety disorder -   Tobacco use   Gastritis     Follow-ups Needed After Discharge   Follow-up Appointments     Follow Up and recommended labs and tests      Follow up with retirement physician.  The following labs/tests are   recommended: BMP within a week.   Follow up with urology Dr. Hale, at the [facility], as   they recommended.    Follow up with Gastroenterology at the [facility] within 2   weeks (an appointment was apparently made in 1 month from last discharge   at the end of June.)             Unresulted Labs Ordered in the Past 30 Days of this Admission     Date and Time Order Name Status Description    7/17/2020 1614 Blood culture Preliminary     7/17/2020 1614 Blood culture Preliminary       Hospitalist group will be notified if cultures turn positive. No growth to date.       Discharge Disposition   Discharged to short-term care facility  Condition at discharge: Good  Patient ready to discharge to a skilled nursing facility as soon as possible in order to create capacity for patients related to the COVID-19 pandemic.    Hospital Course   Abhijeet Mccauley is a 59 year old male admitted on 7/17/2020. He presented with AMS. He has a history of alcohol use disorder, alcohol withdrawal seizures, EtOH cirrhosis, COPD, CKD III, AML (diagnosed 2008, s/p chemotherapy, complete remission >10  years), and s/p left hip replacement.    Patient was transferred to [facility] emergency room from nursing home because of altered mental status, with worsening confusion and recurrent falls. Patient found to have UTI and encephalopathy and subsequently transferred to [facility] for further management..     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 Lino. UA consistent with UTI. Urine cultures obtained blood cultures obtained on admission, growing Enterococcus faecalis. His previous urine culture grows Enterobacter cloaca.     - Cefepime started at admission > changed to zosyn   - Enterococcus, pan sensitive. Afebrile, hemodynamically stable > changed to amoxicillin to 875 mg BID.  - Urology consulted and no inpatient intervention recommended.    - He had follow-up scheduled with Dr. Galeana on 7/21/2020 and this will need to be rescheduled. Care coordinator consulted to make sure this appointment is in place.   - Chong removed on 7/20 and voiding.     Metabolic encephalopathy, resolved- Suspect secondary to UTI and complicated by liver cirrhosis with possible component of hepatic encephalopathy  CT head was negative,  Encephalopathy most likely multifactorial in the setting of infection.    - Treated UTI as above and tx of hepatic encephalopathy as below.   - Avoid sedating medications.   - Scored 17 out of 30 with OT      Cirrhosis with ascites with elevated alkaline phosphatase, nl bili, plts. INR 1.48.   Moderate to large ascites   Hx of Alcohol use disorder  Assessment: previous ultrasound demonstrated cirrhosis changes. MRCP done in 06/2020  without evidence of biliary pathology to account for the elevated alkaline phosphatase. Reported history of esophageal varices, but none documented on last EGD in February.     - Continue lactulose 30 ML TID with frequent stooling. Titrate to ~ 3 stools per day.   -  Continue rifampin 550 BID  - Continue furosemide  - Spironolactone held due to GENEVIEVE. Resume PTA dose of 25 mg on 07/20/20 with stable Cr and K. Recheck BMP in one week.   - Continue propranalol  - He was to follow up with [facility] hepat[facility] clinic around this time. Care coordinator consulted re: confirming follow up appointment.     NAG metabolic acidosis, RESOLVING Noted on 07/20/20 (bicarbonate 16). Possibly from diarrhea with lactulose. Multiple stools noted. Also CKD likely plays a role.   - hold lactulose for 3 ore more stools per day.   - Resolving with bicarbonate on 18 on day of discharge.   - F/u BMP ordered      Rib fractures, noted during last admission. CT chest abdomen pelvis done for fall at admission: Multiple bilateral rib fractures in various stages of healing. No  new fractures compared to 6/14/2020. Patient notes some right sided posterior chest wall pain, worsened with palpation.     - Tylenol 650 mg TID PRN (Under 2 grams per day in context of cirrhosis)   - Lidocaine patch daily  - Continue PTA gabapentin 300 mg BID.   - Using ibuprofen PTA and tolerating. I reordered this at discharge at lower dose of just once daily. This is relatively contraindicated in cirrhosis and CKD and both these should be monitored if patient using ibuprofen regularly.      CKD baseline creatinine appears to be ~1.7  Recent Labs   Lab 07/21/20  0829 07/20/20  0845 07/19/20  0855 07/18/20  0841 07/17/20  1124   CR 1.67* 1.74* 1.78* 1.76* 1.92*        CHRONIC   Acute on chronic macrocytic anemia- Combination of critical illness, liver disease, alcohol use, and recent blood loss.  S/p hip replacement- stable  COPD- Continue baseline inhalers and PRN albuterol  Hyperlipidemia- Continue home atorvastatin  Generalized anxiety disorder - Continue to hold bupropion; holding trazadone and hydroxyzine  Tobacco use - Continue nicotine patch  Gastritis- Continue protonix BID    Communication: Discussed with patient, brother (Zoran),  care coordinator and RN on 07/21/20      Consultations This Hospital Stay   UROLOGY IP CONSULT  PHYSICAL THERAPY ADULT IP CONSULT  OCCUPATIONAL THERAPY ADULT IP CONSULT  PHARMACY IP CONSULT  CARE COORDINATOR IP CONSULT  PHYSICAL THERAPY ADULT IP CONSULT  OCCUPATIONAL THERAPY ADULT IP CONSULT    Code Status   Full Code    Time Spent on this Encounter   I, Anabelle Mclaughlin MD, personally saw the patient today and spent greater than 30 minutes discharging this patient.       Anabelle Mclaughlin MD  [facility]  ______________________________________________________________________    Physical Exam   Vital Signs: Temp: 98.4  F (36.9  C) Temp src: Oral BP: 119/64 Pulse: 67 Heart Rate: 74 Resp: 18 SpO2: 94 % O2 Device: None (Room air)    Weight: 153 lbs 14.4 oz  Constitutional:     NAD,   Neuropsyche:  alert and oriented to situation, hospital but has poor recall of recent events.   Respiratory:        Breathing comfortably, no audible wheezing.   Cardiovascular:  Regular rate and rhythm, no edema.  GI:  soft, NT/ND, BS normal  Skin/Integumen:  Scattered bruising. No acute rash, excessive bruising or sign of bleeding.        Primary Care Physician   Chidi Valenzuela    Discharge Orders      General info for SNF    Length of Stay Estimate: Short Term Care: Estimated # of Days <30  Condition at Discharge: Improving  Level of care:skilled   Rehabilitation Potential: Fair  Admission H&P remains valid and up-to-date: Yes  Recent Chemotherapy: N/A  Use Nursing Home Standing Orders: Yes     Mantoux instructions    Give two-step Mantoux (PPD) Per Facility Policy Yes     Reason for your hospital stay    You were admitted with confusion and lethargy, likely related to UTI with hepatic encephalopathy.     Activity - Up with nursing assistance     Follow Up and recommended labs and tests    Follow up with correction physician.  The following labs/tests are recommended: BMP within a week.   Follow up with urology   Alexia, at the [facility], as they recommended.    Follow up with Gastroenterology at the [facility] within 2 weeks (an appointment was apparently made in 1 month from last discharge at the end of June.)     Physical Therapy Adult Consult    Evaluate and treat as clinically indicated.    Reason:  Deconditioned secondary to acute illness.     Occupational Therapy Adult Consult    Evaluate and treat as clinically indicated.    Reason:   Deconditioned secondary to acute illness.   .     Advance Diet as Tolerated    Follow this diet upon discharge: Orders Placed This Encounter      Regular Diet Adult       Significant Results and Procedures   Most Recent 3 CBC's:  Recent Labs   Lab Test 07/20/20  0845 07/19/20  0855 07/18/20  0841   WBC 7.9 8.6 11.3*   HGB 8.9* 8.4* 9.0*   MCV 94 93 94    157 197     Most Recent 3 BMP's:  Recent Labs   Lab Test 07/21/20  0829 07/20/20  0845 07/19/20  0855    137 138   POTASSIUM 3.5 3.5 3.9   CHLORIDE 110* 110* 112*   CO2 18* 16* 21   BUN 14 14 15   CR 1.67* 1.74* 1.78*   ANIONGAP 9 11 5   BEE 8.6 8.7 8.7   GLC 88 156* 114*     Most Recent 2 LFT's:  Recent Labs   Lab Test 07/18/20  0841 07/17/20  1124   AST 34 35   ALT 25 28   ALKPHOS 431* 507*   BILITOTAL 0.8 0.8     Most Recent 3 INR's:  Recent Labs   Lab Test 07/17/20  1124 06/21/20  0643 06/12/20  2110   INR 1.48* 1.44* 1.64*     Most Recent 6 Bacteria Isolates From Any Culture (See EPIC Reports for Culture Details):  Lab Test 07/17/20  1646 07/17/20  1639 07/17/20  1630   CULT No growth after 4 days >100,000 colonies/mL  Enterococcus faecalis  * No growth after 4 days     Most Recent Urinalysis:  Recent Labs   Lab Test 07/17/20  1639  05/12/20  1545   COLOR Yellow   < > Yellow   APPEARANCE Cloudy   < > Clear   URINEGLC Negative   < > Negative   URINEBILI Negative   < > Negative   URINEKETONE Negative   < > Negative   SG 1.016   < > 1.010   UBLD Moderate*   < > Trace*   URINEPH 6.0   < > 6.0    PROTEIN Negative   < > Negative   UROBILINOGEN  --   --  0.2   NITRITE Negative   < > Negative   LEUKEST Large*   < > Large*   RBCU 71*   < > 2-5*   WBCU >182*   < > 25-50*    < > = values in this interval not displayed.       Discharge Medications   Current Discharge Medication List      START taking these medications    Details   amoxicillin (AMOXIL) 875 MG tablet Take 1 tablet (875 mg) by mouth every 12 hours for 10 days    Associated Diagnoses: Urinary tract infection without hematuria, site unspecified         CONTINUE these medications which have CHANGED    Details   ibuprofen (ADVIL/MOTRIN) 400 MG tablet Take 1 tablet (400 mg) by mouth daily as needed for pain    Comments: This is a prior to admission medication that pt was tolerating. However, it is relatively contraindicated in the setting of CKD and cirrhosis. I decreased dose at discharge from hospital and defer further dosing and monitoring to provider following at TCU.  Associated Diagnoses: Closed fracture of multiple ribs with routine healing, unspecified laterality, subsequent encounter         CONTINUE these medications which have NOT CHANGED    Details   acetaminophen (TYLENOL) 325 MG tablet Take 2 tablets (650 mg) by mouth 3 times daily  Qty:      Associated Diagnoses: Closed fracture of multiple ribs of right side, initial encounter      albuterol (VENTOLIN HFA) 108 (90 Base) MCG/ACT inhaler Inhale 2 puffs into the lungs every 4 hours as needed for shortness of breath / dyspnea or wheezing  Qty: 18 g, Refills: 11    Comments: Pharmacy may dispense brand covered by insurance (Proair, or proventil or ventolin or generic albuterol inhaler)  Associated Diagnoses: Mild intermittent asthma without complication      alum & mag hydroxide-simethicone (MAALOX  ES) 400-400-40 MG/5ML SUSP suspension Take 30 mLs by mouth every 6 hours as needed for indigestion or heartburn  Qty:      Associated Diagnoses: Alcoholic cirrhosis of liver with ascites (H)       atorvastatin 20 MG PO tablet Take 1 tablet (20 mg) by mouth daily  Qty: 90 tablet, Refills: 3    Associated Diagnoses: ASCVD (arteriosclerotic cardiovascular disease)      buPROPion (WELLBUTRIN) 75 MG tablet Take 75 mg by mouth daily **NOT EXTENDED RELEASE      diclofenac (VOLTAREN) 1 % topical gel PLACE 2 GRAMS ONTO THE SKIN FOUR TIMES A DAY AS NEEDED FOR MODERATE PAIN  Qty: 100 g, Refills: 11    Associated Diagnoses: Bilateral hand pain      fluticasone-salmeterol (ADVAIR) 500-50 MCG/DOSE inhaler Inhale 1 puff into the lungs 2 times daily      folic acid (FOLVITE) 1 MG tablet Take 1 tablet (1 mg) by mouth daily  Qty:      Associated Diagnoses: Alcohol use disorder, severe, dependence (H)      furosemide (LASIX) 20 MG tablet Take 20 mg by mouth daily      gabapentin (NEURONTIN) 300 MG capsule Take 300 mg by mouth 2 times daily      ketoconazole (NIZORAL) 2 % external cream Apply to face topically twice daily for dermatitis (discontinue when resolved and change order to twice daily as needed, start date 7/14/20)      ketoconazole (NIZORAL) 2 % external shampoo Apply to scalp and beard topically 2 times per week (Tuesday and Friday) for dermatitis for 4 weeks (start date 7/14/20)      lactulose 20 GM/30ML SOLN Take 30 mLs by mouth 3 times daily      Lidocaine (LIDOCARE) 4 % Patch Place 1 patch onto the skin 2 times daily      melatonin 3 MG tablet Take 3 mg by mouth At Bedtime      mineral oil-white petrolatum (EUCERIN) CREA cream Apply topically to back at bedtime for xerosis      montelukast (SINGULAIR) 10 MG tablet Take 1 tablet (10 mg) by mouth At Bedtime  Qty: 90 tablet, Refills: 3    Associated Diagnoses: Seasonal allergic rhinitis due to pollen      nicotine (NICODERM CQ) 14 MG/24HR 24 hr patch Place 1 patch onto the skin daily  Qty:      Associated Diagnoses: Encounter for tobacco use cessation counseling      nicotine 2 MG MT lozenge Place 1 lozenge (2 mg) inside cheek every hour as needed for smoking  cessation  Qty: 108 lozenge, Refills: 11    Associated Diagnoses: Tobacco dependence syndrome      nystatin (MYCOSTATIN) 649426 UNIT/GM external powder Apply to groin topically twice daily      ondansetron (ZOFRAN) 4 MG tablet Take 4 mg by mouth every 12 hours as needed for nausea or vomiting      pantoprazole (PROTONIX) 40 MG EC tablet Take 1 tablet (40 mg) by mouth 2 times daily  Qty: 120 tablet, Refills: 0    Associated Diagnoses: Gastroesophageal reflux disease without esophagitis      polyethylene glycol (MIRALAX) 17 g packet Take 17 g by mouth daily    Associated Diagnoses: Other constipation      propranolol (INDERAL) 20 MG tablet Take 1 tablet (20 mg) by mouth 2 times daily  Qty: 180 tablet, Refills: 3    Associated Diagnoses: Alcoholic cirrhosis of liver with ascites (H)      rifaximin (XIFAXAN) 550 MG TABS tablet Take 550 mg by mouth 2 times daily       senna-docusate (SENOKOT-S/PERICOLACE) 8.6-50 MG tablet Take 1-2 tablets by mouth daily as needed for constipation      spironolactone (ALDACTONE) 25 MG tablet Take 25 mg by mouth daily      traZODone (DESYREL) 50 MG tablet Take 50 mg by mouth nightly as needed for sleep      vitamin B1 (THIAMINE) 100 MG tablet Take 1 tablet (100 mg) by mouth daily  Qty:      Associated Diagnoses: Alcohol use disorder, severe, dependence (H)      !! order for DME Equipment being ordered: 4 wheel walker  Qty: 1 Units, Refills: 0    Associated Diagnoses: Peripheral polyneuropathy; Primary osteoarthritis of left knee      !! order for DME Equipment being ordered: 2 pairs thigh high compression stockings, strength per patient preference  Qty: 2 Units, Refills: 1    Associated Diagnoses: Peripheral edema      !! order for DME Equipment being ordered: Compression Stockings TWO (2) Pairs, 15-20 mm Hg.  Qty: 2 Package, Refills: prn    Associated Diagnoses: Bilateral leg edema      !! order for DME Equipment being ordered: bed pull up bar  Qty: 1 Units, Refills: 0    Associated  Diagnoses: Generalized weakness      !! order for DME Equipment being ordered: shower chair  Qty: 1 Device, Refills: 0    Associated Diagnoses: Acute myeloid leukemia in remission (H)       !! - Potential duplicate medications found. Please discuss with provider.      STOP taking these medications       sulfamethoxazole-trimethoprim (BACTRIM DS) 800-160 MG tablet Comments:   Reason for Stopping:             Allergies   Allergies   Allergen Reactions     Blood Transfusion Related (Informational Only) Other (See Comments)     Patient has a history of a clinically significant antibody against RBC antigens.  A delay in compatible RBCs may occur.     Famotidine GI Disturbance     Severe abdominal cramps     Pepcid GI Disturbance     Severe abdominal cramps     Vancomycin Visual Disturbance     Hallucinations     Cyclobenzaprine Hives     Hydrocodone-Acetaminophen Rash     Methocarbamol Dermatitis     Localized to face     Vfend Nausea and GI Disturbance     IV - cold sweats ; Iron tablet - nausea/stomach pain

## 2020-07-21 NOTE — PLAN OF CARE
DATE & TIME: 7/20/20 [id number]           Cognitive Concerns/ Orientation : A&Ox3, disoriented to place, thinks he is at [place]. Forgetful.   BEHAVIOR & AGGRESSION TOOL COLOR: Green  CIWA SCORE: N/A   ABNL VS/O2: VSS on RA  MOBILITY: Ax1 w/ GB and walker. Up to bathroom.   PAIN MANAGMENT: PRN Tylenol given x1 for back pain.   DIET: Regular. Tolerating his diet. Denies nausea  BOWEL/BLADDER: Chong removed during day shift, Pt urinating. Continent. Loose/watery BMs, pt is on lactulose, BM x0 this shift. On aldactone now.   ABNL LAB/BG: Cr 1.74, Hg 8.9  DRAIN/DEVICES: PIV saline lock  TELEMETRY RHYTHM: N/A  SKIN: Dry/flaky scattered scabing. Blanchable redness on coccyx area.   TESTS/PROCEDURES: N/A  D/C DAY/GOALS/PLACE: Discharge possibly tomorrow to TCU. Pt will restart aldactone and Cr checked tomorrow   OTHER IMPORTANT INFO: Q6 Neuro intact, except numbness/tingling to BLE (baseline per pt) and cognition. PT/OT are following. SW is following.   Urology signed off.

## 2020-07-21 NOTE — PROGRESS NOTES
Appointments for Gastroenterology and Urology  Clinics contacted. They do not have appointments available  For couple months.  stated she will contact the nurses for both the clinics and will call patient on his cell with appointment dates.

## 2020-07-21 NOTE — CONSULTS
Care Transition Initial Assessment - SW     Met with: patient, spoke with siblings over the phone, brother Jerry and sister Nurys     Principal Problem:    Urinary tract infection  Active Problems:    Essential hypertension    Acute myeloid leukemia in remission (H)    Alcoholic cirrhosis of liver (H)    Thrombocytopenia (H)    Universal ulcerative (chronic) colitis(556.6) (H)    CKD (chronic kidney disease) stage 3, GFR 30-59 ml/min (H)    Mild intermittent asthma without complication    Peptic ulcer disease    Esophageal varices (H)    Iron deficiency anemia due to chronic blood loss    Hepatic encephalopathy (H)    UTI (urinary tract infection)       DATA  Lives With: sibling(s)   Living Arrangements: house  Identified issues/concerns regarding health management:  Patient admitted 7/17/20 under Inpatient status.  Patient admitted with altered mental status with a histor of alcohol use disorder and ETOH cirrhosis.  He was in rehab at [facility].  Patient seen by OT during this stay and OT recommends patient returning to TCU    ASSESSMENT  Cognitive Status: as of today, 7/21, RN notes indicate patient is disoriented to place and forgetful.  Thpught he was at [facility].  Concerns to be addressed: Sister Nurys requesting patient transfer to [facility] or [facility], both would be close to patient's brother Jerry.  Referrals were made and patient was offered rooms at both facilities.  Writer spoke with brother and he shared [facility] as patient was there 1&1/2 years ago, treated well and the facility is within walking distance for him. Writer updated [facility]de liaison we are accepting the [facility] vacancy.  See earlier note from today for further discussion had with brother and patient.    PLAN  If patient is d/c'd today, [facility] is scheduled for a4:15pm ride. Awaiting decision by Dr Mclaughlin.  Financial costs for the patient includes none due to his insurance  plan.  Patient given options and choices for discharge yws.  Patient/family is agreeable to the plan?  yes  [facility].  Patient Goals and Preferences:  Transfer to [facility]  Patient anticipates discharging to:  TCU

## 2020-07-21 NOTE — PLAN OF CARE
DATE & TIME: 7/21/20 2161-6122         Cognitive Concerns/ Orientation : A&Ox3, disoriented to place.  Forgetful.   BEHAVIOR & AGGRESSION TOOL COLOR: Green  CIWA SCORE: N/A   ABNL VS/O2: VSS on RA  MOBILITY: Ax1 w/ GB and walker. Up to bathroom.   PAIN MANAGMENT: Denies  DIET: Regular. Denies nausea  BOWEL/BLADDER: Continent. Loose/watery BMs, pt is on lactulose, BM x1 this shift.   ABNL LAB/BG: None new.  DRAIN/DEVICES: PIV saline lock  TELEMETRY RHYTHM: N/A  SKIN: Dry/flaky scattered scabbing. Blanchable redness on coccyx area.   TESTS/PROCEDURES: N/A  D/C DAY/GOALS/PLACE: Discharge possibly today to TCU pending stable creatinine.   OTHER IMPORTANT INFO:  Neuros intact, except numbness/tingling to BLE (baseline per pt) and cognition. PT/OT/ SW are following.

## 2020-07-21 NOTE — PLAN OF CARE
Discharge    Patient discharged to TCU via car with transportation  Care plan note    DATE & TIME: 7/21/20 AM shift  Cognitive Concerns/ Orientation : A&Ox3, disoriented to place.  Forgetful.   BEHAVIOR & AGGRESSION TOOL COLOR: Green  CIWA SCORE: N/A   ABNL VS/O2: VSS on RA  MOBILITY: Ax1 w/ GB and walker. Up to bathroom.   PAIN MANAGMENT: c/o lower back pain, Tylenol x 1 given, pt reports decrease in pain. Encouraged to move around and sit up in the chair vs lay in bed  DIET: Regular. Denies nausea  BOWEL/BLADDER: Continent. Loose/watery BMs, pt is on lactulose, BM x1 this shift.   ABNL LAB/BG: Creatinine 1.65  DRAIN/DEVICES: PIV saline lock  TELEMETRY RHYTHM: N/A  SKIN: Dry/flaky scattered scabbing. Blanchable redness on coccyx area.   TESTS/PROCEDURES: N/A  D/C DAY/GOALS/PLACE: Discharge to TCU today at 4:15 pm.   OTHER IMPORTANT INFO:  Neuros intact, except numbness/tingling to BLE (baseline per pt) and cognition. PT/OT/ SW are following.     Listed belongings gathered and returned to patient. Yes  Care Plan and Patient education resolved: Yes  Prescriptions if needed, hard copies sent with patient  NA  Home and hospital acquired medications returned to patient: NA  Medication Bin checked and emptied on discharge Yes  Follow up appointment made for patient: No

## 2020-07-21 NOTE — PLAN OF CARE
Physical Therapy Discharge Summary    Reason for therapy discharge:    Discharged to transitional care facility.    Progress towards therapy goal(s). See goals on Care Plan in [facility] electronic health record for goal details.  Goals partially met.  Barriers to achieving goals:   discharge from facility.    Therapy recommendation(s):    Continued therapy is recommended.  Rationale/Recommendations:  to improve IND with functional mobility as pt below baseline at this time .

## 2020-07-22 NOTE — PLAN OF CARE
Occupational Therapy Discharge Summary    Reason for therapy discharge:    Discharged to transitional care facility.    Progress towards therapy goal(s). See goals on Care Plan in [facility] electronic health record for goal details.  Goals partially met.  Barriers to achieving goals:   discharge from facility.    Therapy recommendation(s):    Continued therapy is recommended.  Rationale/Recommendations:  limited self-cares/mobility.

## 2020-07-22 NOTE — TELEPHONE ENCOUNTER
Message sent to Dr. Mclaughlin as well as hepatology pool. Per Oumar son PA-C, pt to see Hepatology for Liver Cirrhosis. Will keep encounter open until patient is scheduled.    Chelsea Ortiz RN  Gastroenterology Care Coordinator  [place], [place]

## 2020-07-23 LAB
BACTERIA SPEC CULT: NO GROWTH
BACTERIA SPEC CULT: NO GROWTH
SPECIMEN SOURCE: NORMAL
SPECIMEN SOURCE: NORMAL

## 2020-07-24 NOTE — TELEPHONE ENCOUNTER
Call pt to discuss Hepatology referral.    Chelsea Ortiz RN  Gastroenterology Care Coordinator  [place], [place]

## 2020-07-27 ENCOUNTER — PATIENT OUTREACH (OUTPATIENT)
Dept: CARE COORDINATION | Facility: CLINIC | Age: 60
End: 2020-07-27

## 2020-07-27 NOTE — PROGRESS NOTES
Clinic Care Coordination Contact    Situation: Patient chart reviewed by care coordinator.    Background: Patient is currently being followed by [facility] for history of ETOH dependence.    Assessment: [facility]. [facility] will contact when pt has completed stay at TCU.    Plan/Recommendations: [facility] will follow to determine next appropriate outreach date.      RUDY Allen     [facility] Care Coordination  [facility]  Tel: [phone]

## 2020-07-29 NOTE — TELEPHONE ENCOUNTER
LVM for pt, pt's wife and Bhupendra Mccauley.  Will send a letter.    Chelsea Ortiz RN  Gastroenterology Care Coordinator  [place], [place]

## 2020-08-05 ENCOUNTER — TELEPHONE (OUTPATIENT)
Dept: FAMILY MEDICINE | Facility: CLINIC | Age: 60
End: 2020-08-05

## 2020-08-05 DIAGNOSIS — K21.9 GASTROESOPHAGEAL REFLUX DISEASE WITHOUT ESOPHAGITIS: ICD-10-CM

## 2020-08-05 DIAGNOSIS — M87.9 BILATERAL HIP OSTEONECROSIS (H): ICD-10-CM

## 2020-08-05 RX ORDER — PANTOPRAZOLE SODIUM 40 MG/1
TABLET, DELAYED RELEASE ORAL
Qty: 120 TABLET | Refills: 0 | Status: SHIPPED | OUTPATIENT
Start: 2020-08-05 | End: 2020-10-07

## 2020-08-05 RX ORDER — TRAMADOL HYDROCHLORIDE 50 MG/1
TABLET ORAL
Qty: 30 TABLET | Refills: 0 | OUTPATIENT
Start: 2020-08-05

## 2020-08-05 NOTE — TELEPHONE ENCOUNTER
Routing refill request to provider for review/approval because:  Drug not on the [facility] refill protocol   Drug not active.    Fatou ESTRADA RN BSN

## 2020-08-05 NOTE — TELEPHONE ENCOUNTER
"Prescription approved per [facility] Refill Protocol.  Requested Prescriptions   Pending Prescriptions Disp Refills     pantoprazole (PROTONIX) 40 MG EC tablet [Pharmacy Med Name: PANTOPRAZOLE SODIUM 40MG TBEC] 120 tablet 0     Sig: TAKE ONE TABLET BY MOUTH TWICE A DAY       PPI Protocol Passed - 8/5/2020  9:47 AM        Passed - Not on Clopidogrel (unless Pantoprazole ordered)        Passed - No diagnosis of osteoporosis on record        Passed - Recent (12 mo) or future (30 days) visit within the authorizing provider's specialty     Patient has had an office visit with the authorizing provider or a provider within the authorizing providers department within the previous 12 mos or has a future within next 30 days. See \"Patient Info\" tab in inbasket, or \"Choose Columns\" in Meds & Orders section of the refill encounter.              Passed - Medication is active on med list        Passed - Patient is age 18 or older           Fatou ESTRADA RN BSN    "

## 2020-08-06 NOTE — TELEPHONE ENCOUNTER
Message left for patient to return call to clinic.  CSS - ok to deliver message below.  OV needed for tramadol refill.  Fatou ESTRADA RN BSN

## 2020-08-10 NOTE — TELEPHONE ENCOUNTER
Patient notified. States he already has an appointment on 8/14/20 scheduled. Will discuss then.      LEONARD ThaoN, RN

## 2020-08-13 ENCOUNTER — APPOINTMENT (OUTPATIENT)
Dept: CT IMAGING | Facility: CLINIC | Age: 60
End: 2020-08-13
Attending: FAMILY MEDICINE
Payer: COMMERCIAL

## 2020-08-13 ENCOUNTER — TELEPHONE (OUTPATIENT)
Dept: FAMILY MEDICINE | Facility: CLINIC | Age: 60
End: 2020-08-13

## 2020-08-13 ENCOUNTER — HOSPITAL ENCOUNTER (INPATIENT)
Facility: CLINIC | Age: 60
LOS: 16 days | Discharge: HOME HEALTH | End: 2020-08-29
Attending: INTERNAL MEDICINE | Admitting: HOSPITALIST
Payer: COMMERCIAL

## 2020-08-13 ENCOUNTER — HOSPITAL ENCOUNTER (EMERGENCY)
Facility: CLINIC | Age: 60
Discharge: ANOTHER HOSPITAL | End: 2020-08-13
Attending: FAMILY MEDICINE | Admitting: FAMILY MEDICINE
Payer: COMMERCIAL

## 2020-08-13 VITALS
DIASTOLIC BLOOD PRESSURE: 63 MMHG | BODY MASS INDEX: 26.66 KG/M2 | TEMPERATURE: 97.8 F | RESPIRATION RATE: 20 BRPM | SYSTOLIC BLOOD PRESSURE: 133 MMHG | OXYGEN SATURATION: 100 % | HEIGHT: 69 IN | WEIGHT: 180 LBS | HEART RATE: 65 BPM

## 2020-08-13 DIAGNOSIS — F41.9 ANXIETY: Primary | ICD-10-CM

## 2020-08-13 DIAGNOSIS — K92.2 GASTROINTESTINAL HEMORRHAGE, UNSPECIFIED GASTROINTESTINAL HEMORRHAGE TYPE: ICD-10-CM

## 2020-08-13 DIAGNOSIS — A41.9 SEPSIS, DUE TO UNSPECIFIED ORGANISM, UNSPECIFIED WHETHER ACUTE ORGAN DYSFUNCTION PRESENT (H): ICD-10-CM

## 2020-08-13 DIAGNOSIS — N39.0 UTI (URINARY TRACT INFECTION), BACTERIAL: ICD-10-CM

## 2020-08-13 DIAGNOSIS — D64.9 ANEMIA, UNSPECIFIED TYPE: ICD-10-CM

## 2020-08-13 DIAGNOSIS — K76.82 HEPATIC ENCEPHALOPATHY (H): ICD-10-CM

## 2020-08-13 DIAGNOSIS — F10.280 ALCOHOL DEPENDENCE WITH ALCOHOL-INDUCED ANXIETY DISORDER (H): ICD-10-CM

## 2020-08-13 DIAGNOSIS — A49.9 UTI (URINARY TRACT INFECTION), BACTERIAL: ICD-10-CM

## 2020-08-13 DIAGNOSIS — I85.00 ESOPHAGEAL VARICES WITHOUT BLEEDING, UNSPECIFIED ESOPHAGEAL VARICES TYPE (H): ICD-10-CM

## 2020-08-13 DIAGNOSIS — K59.09 OTHER CONSTIPATION: ICD-10-CM

## 2020-08-13 DIAGNOSIS — K29.21 ALCOHOLIC GASTRITIS WITH HEMORRHAGE, UNSPECIFIED CHRONICITY: ICD-10-CM

## 2020-08-13 DIAGNOSIS — K70.30 ALCOHOLIC CIRRHOSIS, UNSPECIFIED WHETHER ASCITES PRESENT (H): ICD-10-CM

## 2020-08-13 LAB
ABO + RH BLD: NORMAL
ABO + RH BLD: NORMAL
ALBUMIN SERPL-MCNC: 2.5 G/DL (ref 3.4–5)
ALBUMIN UR-MCNC: NEGATIVE MG/DL
ALP SERPL-CCNC: 487 U/L (ref 40–150)
ALT SERPL W P-5'-P-CCNC: 26 U/L (ref 0–70)
AMMONIA PLAS-SCNC: 54 UMOL/L (ref 10–50)
AMPHETAMINES UR QL SCN: NEGATIVE
ANION GAP SERPL CALCULATED.3IONS-SCNC: 10 MMOL/L (ref 3–14)
APPEARANCE UR: ABNORMAL
APTT PPP: 39 SEC (ref 22–37)
AST SERPL W P-5'-P-CCNC: 32 U/L (ref 0–45)
BARBITURATES UR QL: NEGATIVE
BASOPHILS # BLD AUTO: 0.1 10E9/L (ref 0–0.2)
BASOPHILS NFR BLD AUTO: 0.5 %
BENZODIAZ UR QL: NEGATIVE
BILIRUB SERPL-MCNC: 1.1 MG/DL (ref 0.2–1.3)
BILIRUB UR QL STRIP: NEGATIVE
BLD GP AB SCN SERPL QL: NORMAL
BLD PROD TYP BPU: NORMAL
BLD PROD TYP BPU: NORMAL
BLD UNIT ID BPU: 0
BLOOD BANK CMNT PATIENT-IMP: NORMAL
BLOOD PRODUCT CODE: NORMAL
BPU ID: NORMAL
BUN SERPL-MCNC: 20 MG/DL (ref 7–30)
CALCIUM SERPL-MCNC: 8.4 MG/DL (ref 8.5–10.1)
CANNABINOIDS UR QL SCN: NEGATIVE
CHLORIDE SERPL-SCNC: 112 MMOL/L (ref 94–109)
CO2 SERPL-SCNC: 19 MMOL/L (ref 20–32)
COCAINE UR QL: NEGATIVE
COLOR UR AUTO: YELLOW
CREAT SERPL-MCNC: 1.68 MG/DL (ref 0.66–1.25)
DIFFERENTIAL METHOD BLD: ABNORMAL
EOSINOPHIL # BLD AUTO: 0.5 10E9/L (ref 0–0.7)
EOSINOPHIL NFR BLD AUTO: 3.8 %
ERYTHROCYTE [DISTWIDTH] IN BLOOD BY AUTOMATED COUNT: 19.9 % (ref 10–15)
ETHANOL SERPL-MCNC: 0.03 G/DL
GFR SERPL CREATININE-BSD FRML MDRD: 43 ML/MIN/{1.73_M2}
GLUCOSE SERPL-MCNC: 72 MG/DL (ref 70–99)
GLUCOSE UR STRIP-MCNC: NEGATIVE MG/DL
HCT VFR BLD AUTO: 21.9 % (ref 40–53)
HGB BLD-MCNC: 6.6 G/DL (ref 13.3–17.7)
HGB UR QL STRIP: ABNORMAL
IMM GRANULOCYTES # BLD: 0 10E9/L (ref 0–0.4)
IMM GRANULOCYTES NFR BLD: 0.3 %
INR PPP: 1.45 (ref 0.86–1.14)
KETONES UR STRIP-MCNC: NEGATIVE MG/DL
LACTATE BLD-SCNC: 2.6 MMOL/L (ref 0.7–2)
LEUKOCYTE ESTERASE UR QL STRIP: ABNORMAL
LIPASE SERPL-CCNC: 135 U/L (ref 73–393)
LYMPHOCYTES # BLD AUTO: 2.6 10E9/L (ref 0.8–5.3)
LYMPHOCYTES NFR BLD AUTO: 20.1 %
MCH RBC QN AUTO: 26.8 PG (ref 26.5–33)
MCHC RBC AUTO-ENTMCNC: 30.1 G/DL (ref 31.5–36.5)
MCV RBC AUTO: 89 FL (ref 78–100)
MONOCYTES # BLD AUTO: 1.8 10E9/L (ref 0–1.3)
MONOCYTES NFR BLD AUTO: 13.7 %
NEUTROPHILS # BLD AUTO: 8 10E9/L (ref 1.6–8.3)
NEUTROPHILS NFR BLD AUTO: 61.6 %
NITRATE UR QL: NEGATIVE
NRBC # BLD AUTO: 0 10*3/UL
NRBC BLD AUTO-RTO: 0 /100
NUM BPU REQUESTED: 2
OPIATES UR QL SCN: NEGATIVE
PCP UR QL SCN: NEGATIVE
PH UR STRIP: 6 PH (ref 5–7)
PLATELET # BLD AUTO: 182 10E9/L (ref 150–450)
POTASSIUM SERPL-SCNC: 3.5 MMOL/L (ref 3.4–5.3)
PROT SERPL-MCNC: 6.2 G/DL (ref 6.8–8.8)
RBC # BLD AUTO: 2.46 10E12/L (ref 4.4–5.9)
RBC #/AREA URNS AUTO: 3 /HPF (ref 0–2)
SODIUM SERPL-SCNC: 141 MMOL/L (ref 133–144)
SOURCE: ABNORMAL
SP GR UR STRIP: 1 (ref 1–1.03)
SPECIMEN EXP DATE BLD: NORMAL
SQUAMOUS #/AREA URNS AUTO: <1 /HPF (ref 0–1)
TRANSFUSION STATUS PATIENT QL: NORMAL
TRANSFUSION STATUS PATIENT QL: NORMAL
UROBILINOGEN UR STRIP-MCNC: 0 MG/DL (ref 0–2)
WBC # BLD AUTO: 13 10E9/L (ref 4–11)
WBC #/AREA URNS AUTO: 68 /HPF (ref 0–5)

## 2020-08-13 PROCEDURE — 25800030 ZZH RX IP 258 OP 636: Performed by: FAMILY MEDICINE

## 2020-08-13 PROCEDURE — 80307 DRUG TEST PRSMV CHEM ANLYZR: CPT | Performed by: FAMILY MEDICINE

## 2020-08-13 PROCEDURE — 80320 DRUG SCREEN QUANTALCOHOLS: CPT | Performed by: FAMILY MEDICINE

## 2020-08-13 PROCEDURE — 96366 THER/PROPH/DIAG IV INF ADDON: CPT | Performed by: FAMILY MEDICINE

## 2020-08-13 PROCEDURE — 81001 URINALYSIS AUTO W/SCOPE: CPT | Performed by: FAMILY MEDICINE

## 2020-08-13 PROCEDURE — 85025 COMPLETE CBC W/AUTO DIFF WBC: CPT | Performed by: FAMILY MEDICINE

## 2020-08-13 PROCEDURE — 86900 BLOOD TYPING SEROLOGIC ABO: CPT | Performed by: FAMILY MEDICINE

## 2020-08-13 PROCEDURE — 12000000 ZZH R&B MED SURG/OB

## 2020-08-13 PROCEDURE — 86901 BLOOD TYPING SEROLOGIC RH(D): CPT | Performed by: FAMILY MEDICINE

## 2020-08-13 PROCEDURE — U0003 INFECTIOUS AGENT DETECTION BY NUCLEIC ACID (DNA OR RNA); SEVERE ACUTE RESPIRATORY SYNDROME CORONAVIRUS 2 (SARS-COV-2) (CORONAVIRUS DISEASE [COVID-19]), AMPLIFIED PROBE TECHNIQUE, MAKING USE OF HIGH THROUGHPUT TECHNOLOGIES AS DESCRIBED BY CMS-2020-01-R: HCPCS | Performed by: FAMILY MEDICINE

## 2020-08-13 PROCEDURE — 72125 CT NECK SPINE W/O DYE: CPT

## 2020-08-13 PROCEDURE — 82140 ASSAY OF AMMONIA: CPT | Performed by: FAMILY MEDICINE

## 2020-08-13 PROCEDURE — 96365 THER/PROPH/DIAG IV INF INIT: CPT | Performed by: FAMILY MEDICINE

## 2020-08-13 PROCEDURE — C9113 INJ PANTOPRAZOLE SODIUM, VIA: HCPCS | Performed by: FAMILY MEDICINE

## 2020-08-13 PROCEDURE — 25000128 H RX IP 250 OP 636: Performed by: FAMILY MEDICINE

## 2020-08-13 PROCEDURE — 86850 RBC ANTIBODY SCREEN: CPT | Performed by: FAMILY MEDICINE

## 2020-08-13 PROCEDURE — P9016 RBC LEUKOCYTES REDUCED: HCPCS | Performed by: FAMILY MEDICINE

## 2020-08-13 PROCEDURE — 70450 CT HEAD/BRAIN W/O DYE: CPT

## 2020-08-13 PROCEDURE — 74176 CT ABD & PELVIS W/O CONTRAST: CPT

## 2020-08-13 PROCEDURE — 99285 EMERGENCY DEPT VISIT HI MDM: CPT | Mod: Z6 | Performed by: FAMILY MEDICINE

## 2020-08-13 PROCEDURE — 83605 ASSAY OF LACTIC ACID: CPT | Performed by: FAMILY MEDICINE

## 2020-08-13 PROCEDURE — 96372 THER/PROPH/DIAG INJ SC/IM: CPT | Performed by: FAMILY MEDICINE

## 2020-08-13 PROCEDURE — 96375 TX/PRO/DX INJ NEW DRUG ADDON: CPT | Performed by: FAMILY MEDICINE

## 2020-08-13 PROCEDURE — 96361 HYDRATE IV INFUSION ADD-ON: CPT | Performed by: FAMILY MEDICINE

## 2020-08-13 PROCEDURE — 85610 PROTHROMBIN TIME: CPT | Performed by: FAMILY MEDICINE

## 2020-08-13 PROCEDURE — 25000125 ZZHC RX 250: Performed by: FAMILY MEDICINE

## 2020-08-13 PROCEDURE — C9803 HOPD COVID-19 SPEC COLLECT: HCPCS | Performed by: FAMILY MEDICINE

## 2020-08-13 PROCEDURE — 80053 COMPREHEN METABOLIC PANEL: CPT | Performed by: FAMILY MEDICINE

## 2020-08-13 PROCEDURE — 87086 URINE CULTURE/COLONY COUNT: CPT | Performed by: FAMILY MEDICINE

## 2020-08-13 PROCEDURE — 85730 THROMBOPLASTIN TIME PARTIAL: CPT | Performed by: FAMILY MEDICINE

## 2020-08-13 PROCEDURE — HZ2ZZZZ DETOXIFICATION SERVICES FOR SUBSTANCE ABUSE TREATMENT: ICD-10-PCS | Performed by: HOSPITALIST

## 2020-08-13 PROCEDURE — 99291 CRITICAL CARE FIRST HOUR: CPT | Mod: 25 | Performed by: FAMILY MEDICINE

## 2020-08-13 PROCEDURE — 36430 TRANSFUSION BLD/BLD COMPNT: CPT | Performed by: FAMILY MEDICINE

## 2020-08-13 PROCEDURE — 83690 ASSAY OF LIPASE: CPT | Performed by: FAMILY MEDICINE

## 2020-08-13 PROCEDURE — 86922 COMPATIBILITY TEST ANTIGLOB: CPT | Performed by: FAMILY MEDICINE

## 2020-08-13 PROCEDURE — 87040 BLOOD CULTURE FOR BACTERIA: CPT | Performed by: FAMILY MEDICINE

## 2020-08-13 RX ORDER — LORAZEPAM 2 MG/ML
1 INJECTION INTRAMUSCULAR ONCE
Status: COMPLETED | OUTPATIENT
Start: 2020-08-13 | End: 2020-08-13

## 2020-08-13 RX ORDER — SODIUM CHLORIDE, SODIUM LACTATE, POTASSIUM CHLORIDE, CALCIUM CHLORIDE 600; 310; 30; 20 MG/100ML; MG/100ML; MG/100ML; MG/100ML
INJECTION, SOLUTION INTRAVENOUS CONTINUOUS
Status: DISCONTINUED | OUTPATIENT
Start: 2020-08-13 | End: 2020-08-13 | Stop reason: HOSPADM

## 2020-08-13 RX ORDER — AMOXICILLIN 250 MG
1 CAPSULE ORAL 2 TIMES DAILY PRN
Status: DISCONTINUED | OUTPATIENT
Start: 2020-08-13 | End: 2020-08-29 | Stop reason: HOSPADM

## 2020-08-13 RX ORDER — CEFTRIAXONE SODIUM 1 G/50ML
1 INJECTION, SOLUTION INTRAVENOUS ONCE
Status: COMPLETED | OUTPATIENT
Start: 2020-08-13 | End: 2020-08-13

## 2020-08-13 RX ORDER — ONDANSETRON 2 MG/ML
4 INJECTION INTRAMUSCULAR; INTRAVENOUS EVERY 6 HOURS PRN
Status: DISCONTINUED | OUTPATIENT
Start: 2020-08-13 | End: 2020-08-29 | Stop reason: HOSPADM

## 2020-08-13 RX ORDER — LABETALOL HYDROCHLORIDE 5 MG/ML
10 INJECTION, SOLUTION INTRAVENOUS
Status: DISCONTINUED | OUTPATIENT
Start: 2020-08-13 | End: 2020-08-29 | Stop reason: HOSPADM

## 2020-08-13 RX ORDER — SODIUM CHLORIDE 9 MG/ML
INJECTION, SOLUTION INTRAVENOUS CONTINUOUS
Status: DISCONTINUED | OUTPATIENT
Start: 2020-08-14 | End: 2020-08-15

## 2020-08-13 RX ORDER — LIDOCAINE 40 MG/G
CREAM TOPICAL
Status: DISCONTINUED | OUTPATIENT
Start: 2020-08-13 | End: 2020-08-29 | Stop reason: HOSPADM

## 2020-08-13 RX ORDER — BISACODYL 10 MG
10 SUPPOSITORY, RECTAL RECTAL DAILY PRN
Status: DISCONTINUED | OUTPATIENT
Start: 2020-08-13 | End: 2020-08-29 | Stop reason: HOSPADM

## 2020-08-13 RX ORDER — NALOXONE HYDROCHLORIDE 0.4 MG/ML
.1-.4 INJECTION, SOLUTION INTRAMUSCULAR; INTRAVENOUS; SUBCUTANEOUS
Status: DISCONTINUED | OUTPATIENT
Start: 2020-08-13 | End: 2020-08-29 | Stop reason: HOSPADM

## 2020-08-13 RX ORDER — OLANZAPINE 10 MG/2ML
10 INJECTION, POWDER, FOR SOLUTION INTRAMUSCULAR DAILY PRN
Status: COMPLETED | OUTPATIENT
Start: 2020-08-13 | End: 2020-08-13

## 2020-08-13 RX ORDER — ONDANSETRON 4 MG/1
4 TABLET, ORALLY DISINTEGRATING ORAL EVERY 6 HOURS PRN
Status: DISCONTINUED | OUTPATIENT
Start: 2020-08-13 | End: 2020-08-29 | Stop reason: HOSPADM

## 2020-08-13 RX ORDER — ACETAMINOPHEN 650 MG/1
650 SUPPOSITORY RECTAL EVERY 4 HOURS PRN
Status: DISCONTINUED | OUTPATIENT
Start: 2020-08-13 | End: 2020-08-29 | Stop reason: HOSPADM

## 2020-08-13 RX ORDER — AMOXICILLIN 250 MG
2 CAPSULE ORAL 2 TIMES DAILY PRN
Status: DISCONTINUED | OUTPATIENT
Start: 2020-08-13 | End: 2020-08-29 | Stop reason: HOSPADM

## 2020-08-13 RX ORDER — POLYETHYLENE GLYCOL 3350 17 G/17G
17 POWDER, FOR SOLUTION ORAL DAILY PRN
Status: DISCONTINUED | OUTPATIENT
Start: 2020-08-13 | End: 2020-08-29 | Stop reason: HOSPADM

## 2020-08-13 RX ORDER — ACETAMINOPHEN 325 MG/1
650 TABLET ORAL EVERY 4 HOURS PRN
Status: DISCONTINUED | OUTPATIENT
Start: 2020-08-13 | End: 2020-08-29 | Stop reason: HOSPADM

## 2020-08-13 RX ADMIN — PANTOPRAZOLE SODIUM 40 MG: 40 INJECTION, POWDER, FOR SOLUTION INTRAVENOUS at 17:10

## 2020-08-13 RX ADMIN — SODIUM CHLORIDE, POTASSIUM CHLORIDE, SODIUM LACTATE AND CALCIUM CHLORIDE 1000 ML: 600; 310; 30; 20 INJECTION, SOLUTION INTRAVENOUS at 17:10

## 2020-08-13 RX ADMIN — OLANZAPINE 10 MG: 10 INJECTION, POWDER, FOR SOLUTION INTRAMUSCULAR at 16:40

## 2020-08-13 RX ADMIN — CEFTRIAXONE SODIUM 1 G: 1 INJECTION, SOLUTION INTRAVENOUS at 17:46

## 2020-08-13 RX ADMIN — FOLIC ACID: 5 INJECTION, SOLUTION INTRAMUSCULAR; INTRAVENOUS; SUBCUTANEOUS at 17:26

## 2020-08-13 RX ADMIN — LORAZEPAM 1 MG: 2 INJECTION INTRAMUSCULAR; INTRAVENOUS at 20:24

## 2020-08-13 ASSESSMENT — MIFFLIN-ST. JEOR: SCORE: 1611.85

## 2020-08-13 NOTE — ED PROVIDER NOTES
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 moderate right foraminal stenosis at C3-C4, moderate to severe bilateral foraminal stenosis   at C4-C5, moderate bilateral foraminal stenosis at C5-C6, and moderate bilateral foraminal stenosis at C6-C7.     CANAL/FORAMINA: No canal or neural foraminal stenosis.    PARASPINAL: No extraspinal abnormality. Visualized lung fields are clear.      Impression    IMPRESSION:  HEAD CT:  1.  No acute intracranial process.    CERVICAL SPINE CT:  1.  No CT evidence for acute fracture or post traumatic subluxation.  2.  Degenerative cervical spondylosis.   CT Cervical Spine w/o Contrast    Narrative    EXAM: CT HEAD W/O CONTRAST, CT CERVICAL SPINE W/O CONTRAST  LOCATION: [facility]  DATE/TIME: 8/13/2020 5:50 PM    INDICATION: Falls, bruising.  COMPARISON: 07/17/2020.  TECHNIQUE:   HEAD CT: Without IV contrast. Multiplanar reformats. Dose reduction techniques were used.  CERVICAL SPINE CT: Routine without IV contrast. Multiplanar reformats. Dose reduction techniques were used.    FINDINGS:   HEAD CT:   INTRACRANIAL CONTENTS: No intracranial hemorrhage, extraaxial collection, or mass effect.  No CT evidence of acute infarct. Mild presumed chronic small vessel ischemic changes. Mild generalized volume loss. No hydrocephalus.     VISUALIZED ORBITS/SINUSES/MASTOIDS: No intraorbital abnormality. No  paranasal sinus mucosal disease. No middle ear or mastoid effusion.    BONES/SOFT TISSUES: No acute abnormality.    CERVICAL SPINE CT:   VERTEBRA: Vertebral body heights are normal. 0.2 cm anterolisthesis of C2 on C3 and C4 on C5. Sagittal alignment otherwise normal. Degenerative changes result in moderate right foraminal stenosis at C3-C4, moderate to severe bilateral foraminal stenosis   at C4-C5, moderate bilateral foraminal stenosis at C5-C6, and moderate bilateral foraminal stenosis at C6-C7.     CANAL/FORAMINA: No canal or neural foraminal stenosis.    PARASPINAL: No extraspinal abnormality. Visualized lung fields are clear.      Impression    IMPRESSION:  HEAD CT:  1.  No acute intracranial process.    CERVICAL SPINE CT:  1.  No CT evidence for acute fracture or post traumatic subluxation.  2.  Degenerative cervical spondylosis.   Abd/pelvis CT - no contrast - Stone Protocol    Narrative    EXAM: CT ABDOMEN PELVIS W/O CONTRAST  LOCATION: [facility]  DATE/TIME: 8/13/2020 6:06 PM    INDICATION: Pain. History of urolithiasis.  COMPARISON: 07/17/2020.  TECHNIQUE: CT scan of the abdomen and pelvis was performed without IV contrast. Multiplanar reformats were obtained. Dose reduction techniques were used.  CONTRAST: None.    FINDINGS:   LOWER CHEST: Comminuted segmental fracture of the right 7th rib again seen. Healing bilateral fractures. Increased displacement of the right 10th posterior rib fracture.    HEPATOBILIARY: Cirrhosis with multiple varices including the gastroesophageal junction. A few calcified gallstones. Small volume ascites has developed.    PANCREAS: Normal.    SPLEEN: Normal.    ADRENAL GLANDS: Normal.    KIDNEYS/BLADDER: Double-J left ureteric stent again seen with a few small probable stones adjacent to the distal aspect of the stent. No hydronephrosis. There are multiple calcifications throughout the left kidney which are stable.    BOWEL: Stable mesenteric congestion in the  right midabdomen. Motion.    LYMPH NODES: Normal.    VASCULATURE: Unremarkable.    PELVIC ORGANS: Normal.    MUSCULOSKELETAL: Left hip arthroplasty.      Impression    IMPRESSION:   1.  Increased displacement of the right 10th posterior rib fracture. Comminuted segmental fracture right 7th rib with bilateral rib fractures otherwise stable.    2.  Double-J left ureteric stent. No left-sided hydronephrosis. Stable multiple calcifications in left kidney including the medullary portion of the kidney with some cortical thinning. A few small stones adjacent to the distal left ureteric stent.    3.  Cholelithiasis.    4.  Cirrhosis with varices including the gastroesophageal junction. Small volume ascites has developed.         Medications   lactated ringers BOLUS 1,000 mL (0 mLs Intravenous Stopped 8/13/20 2019)     Followed by   lactated ringers infusion (has no administration in time range)   lactated ringers BOLUS 1,000 mL (has no administration in time range)   sodium chloride 0.9 % 1,000 mL with Infuvite Adult 10 mL, thiamine 100 mg, folic acid 1 mg infusion ( Intravenous Stopped 8/13/20 1924)   OLANZapine (zyPREXA) injection 10 mg (10 mg Intramuscular Given 8/13/20 1640)   pantoprazole (PROTONIX) 40 mg IV push injection (40 mg Intravenous Given 8/13/20 1710)   cefTRIAXone in d5w (ROCEPHIN) intermittent infusion 1 g (0 g Intravenous Stopped 8/13/20 1923)   LORazepam (ATIVAN) injection 1 mg (1 mg Intravenous Given 8/13/20 2024)     9:15 PM  I spoke with Dr. Ponce at [facility] regarding this patient.  He agreed to accept the patient in transfer for admission to [facility].  Agrees with management thus far and to accept the patient for further evaluation with involvement of gastroenterology and urology at [facility].    I called the patient's sister Rowena back at [phone] as I had promised her that I would after I had completely evaluated the patient and arrived at the  disposition plan.  There was no answer and I left a message to call back to [phone].    Assessments & Plan (with Medical Decision Making)   Medically complicated 59-year-old male with past medical history reviewed as above who presents to the emergency department with concerns of frequent falls and confusion from home by EMS as discussed in the HPI.  Scant history was available initially, additional history was obtained from the patient's sister with whom he lives.  The patient unfortunately continues with heavy alcohol use, frequent falls, is not infrequently confused and it is unclear with his presentation today whether this is truly acutely new or more of a continuation of his baseline.  The patient is difficult to assess clinically given his confusion and possible intoxication while he was not hypotensive or tachycardic his temperature was mildly elevated at 99.1 at presentation nonfocal exam with the exception of multiple areas of variably aged trauma over many body surfaces, occult blood positive rectal exam with grossly normal-appearing stool.  His lab diagnostics are reflective of leukocytosis of 13.0 and an initial hemoglobin of 6.6 which is certainly abnormal from his previous baseline and in general.  Platelets were normal.  Lactate was initially mildly elevated at 2.6, ammonia was mildly elevated at 54 and his blood alcohol was mildly elevated 0.03 urine toxicology was negative.  Given the possible trauma with multiple falls and a poor history and exam imaging was obtained which included CT of the head which demonstrated no acute intracranial process, cervical spine cc demonstrated no CT evidence for acute fracture or posttraumatic subluxation,.  After obtaining additional information from the patient's sister and extensively reviewing his chart I also obtain an abdominal CT to assess for possible focus of infection or additional traumatic concerns as well as to evaluate additional neurologic  concerns as it pertains to the patient's pre-existing urolithiasis with double-J stent in the left collecting system as previously reviewed in his chart.  Results are reviewed as above.  The patient was given volume approximately 30 cc/kg per sepsis protocol with his initial presentation with confusion and falls and mildly elevated temperature.  Blood cultures were obtained and he was empirically given antibiotics with a suspected urinary tract focus for infection given his previous significant urologic history.  With respect to his hemoglobin of 6.6 the patient was typed and crossmatched for 2 units of packed cells.  This was difficult according to the lab due to antibodies and took considerable time.  Both upper and lower GI endoscopies from earlier this year were reviewed as noted above.  My impression is that this patient has multiple very significant medical issues including the possibility of significant infection with a ureteral stent in place, GI bleed with significant anemia requiring transfusion and will likely need involvement of both urology and gastroenterology at some point.  For this reason I discussed his admission and transfer with hospitalist at [facility] as discussed above.  They were willing to accept the patient in transfer.  Given that he has not manifested any significant hypotension or tachycardia I think he is suitable after approximately 5 hours of demonstrated stability in the emergency department and subtle improvement to be admitted to a [facility] bed.  He was discussed with Dr. Ponce as noted at the time stamp above and transferred to [facility].    Disclaimer: This note consists of symbols derived from keyboarding, dictation and/or voice recognition software. As a result, there may be errors in the script that have gone undetected. Please consider this when interpreting information found in this chart.      I have reviewed the nursing notes.    I have  reviewed the findings, diagnosis, plan and need for follow up with the patient.       New Prescriptions    No medications on file       Final diagnoses:   Sepsis, due to unspecified organism, unspecified whether acute organ dysfunction present (H)   UTI (urinary tract infection), bacterial   Gastrointestinal hemorrhage, unspecified gastrointestinal hemorrhage type   Anemia, unspecified type   Esophageal varices without bleeding, unspecified esophageal varices type (H)   Alcoholic gastritis with hemorrhage, unspecified chronicity       8/13/2020   [facility] EMERGENCY DEPARTMENT     Tolu Josue MD  08/18/20 0940

## 2020-08-13 NOTE — ED TRIAGE NOTES
maren from home, lives with family  Was in rehab for alcohol 7/4-8/5  Started drinking immediately upon returning home  4 beers today  Fell twice today, uses a walker at home  Right elbow laceration   Cut to left eye was from fall several days ago, falling asleep intermittently with overactivity

## 2020-08-13 NOTE — LETTER
Transition Communication Hand-off for Care Transitions to Next Level of Care Provider    Name: Abhijeet Mccauley  : 1960 MRN #: [id number]  Primary Care Provider: Chidi Valenzuela     Primary Clinic: [address] [facility] [address]     Reason for Hospitalization:  Anemia, GI bleed     Admit Date/Time: 2020 11:52 PM       Discharge Date: 20    Payor Source: Payor: UCARE / Plan: UC[employer] CONNECT [place] ONLY / Product Type: HMO /     Readmission Assessment Measure (TALI) Risk Score/category: VERY HIGH    Reason for Communication Hand-off Referral: Multiple providers/specialties   IP Care Coordination consult was placed after-hours on Friday, in which MD asked Care Cordinator to schedule Urology followup (has a stone/stent).  Saturday AM this could not be done due to it being a weekend and no Urology schedulers can be reached.  Pt previously was to see a provider  at the [facility] [phone] but was inpatient at that time; unclear if they ever able to reach him.  Patient has a SLUMS score of 9-14/30, please call his sister for appointments!  He is discharging to his sister's home: [address] [place] [id number].  I wonder if he could be seen by Urology in [place]?   I asked a weekend PCP  to send a message to the [place] [facility] team and primary care scheduling to contact the sister.      Discharge Plan:  Originally was TCU but now going home with homecare.       Concern for non-adherence with plan of care: YES.     Discharge Needs Assessment: needs appointments   Needs      Most Recent Value   Equipment Currently Used at Home  walker, rolling [2WW or 4WW]   # of Referrals Placed by CTS  Post Acute Facilities        Follow-up specialty is recommended: Yes    Follow-up plan:  Home with [facility] homecare services  Any outstanding tests or procedures:    Referrals     Future Labs/Procedures    Home care nursing referral     Comments:    RN skilled nursing visit. RN to  assess vital signs and weight, respiratory and cardiac status, pain level and activity tolerance, hydration, nutrition and bowel status and home safety.  RN to teach n/a.  RN to provide lab draws.    Your provider has ordered home care nursing services. If you have not been contacted within 2 days of your discharge please call your home care agency.  Your Home Care agency is [facility]: [phone]  Your discharge address is: [address] [place] [address]    Home Care OT Referral for Hospital Discharge     Comments:    OT to eval and treat    Your provider has ordered home care - occupational therapy. If you have not been contacted within 2 days of your discharge please call the department phone number listed on the top of this document.    Home Care PT Referral for Hospital Discharge     Comments:    PT to eval and treat    Your provider has ordered home care - physical therapy. If you have not been contacted within 2 days of your discharge please call the department phone number listed on the top of this document.    Home Care Social Service Referral for Hospital Discharge     Comments:     to assist with home safety assessment, outpatient alcohol abuse resources    Your provider has ordered home care - . If you have not been contacted within 2 days of your discharge please call the department phone number listed on the top of this document.          Key Recommendations:  MUST have help scheduling these appointments and the sister (Rowena, [phone]) needs to know the dates / times, cannot complete as inpatient these as the request occurred on a weekend and unable to schedule appointments due to specialty clinic hours do not include weekends---sending handoff to clinic care coordination to please continue to help in outpatient setting.      [facility]: [phone]  Your discharge address: [address] [place] [address]      Primary Care -  your primary care provider is listed below,  Chidi Rivero ([phone])   [address] [facility] [address]      Urology Follow Up  We were unable to schedule an appointment for you due to Urology offices are closed on the weekend.   Urology at [facility]  Appointments / Information: [phone]  Address: [address] [place] [address]. [place] [address]  *please call Monday to confirm appointment       Chemical Dependency Followup - two options for scheduling an evaluation and followup care:   1) on Monday, call Behavioral Outpatient Intake [phone], or   2) self-schedule through the [employer] application, at any time for next available appt;  follow prompts for  substance use .      Psychiatry Followup   You need to schedule with a psychiatrist.  One option is with a [facility] provider.  To set up a [facility] intake consult, please call:   Silvina Campbell, Phone: [phone]  Address: [address], [place] [address]  * if you prefer a different psychiatry group, please work with your Primary Care Provider and Home Care nurse for referrals to locations near you    Karlene Maldonado RN    AVS/Discharge Summary is the source of truth; this is a helpful guide for improved communication of patient story

## 2020-08-13 NOTE — ED NOTES
Medication Reconciliation is incomplete. Patient lives with sister Rowena and has Elysia (family friend) who has begun helping with medication care and appointments. Patient [facility] Patient has been home since last Tuesday and family is unable to give accurate medication history on patient.    Patient is not coherent to go over medications. Unknown last time patient took medications and patient is pending for home care.    List incomplete

## 2020-08-13 NOTE — ED NOTES
Lab called regarding blood, they are trying to see if the B- will work with his positive antigens, if it can they will have it here in 30-45 mins if they cant it will be several hours, they will call us back to update us on the progress.

## 2020-08-13 NOTE — TELEPHONE ENCOUNTER
CARL- homecare physical therapist-- called looking for verbal orders for skilled nursing to set up medication and general assistance with [facility]

## 2020-08-14 ENCOUNTER — MEDICAL CORRESPONDENCE (OUTPATIENT)
Dept: HEALTH INFORMATION MANAGEMENT | Facility: CLINIC | Age: 60
End: 2020-08-14

## 2020-08-14 LAB
ABO + RH BLD: NORMAL
ABO + RH BLD: NORMAL
ALBUMIN SERPL-MCNC: 2.6 G/DL (ref 3.4–5)
ALP SERPL-CCNC: 470 U/L (ref 40–150)
ALT SERPL W P-5'-P-CCNC: 25 U/L (ref 0–70)
AMMONIA PLAS-SCNC: 29 UMOL/L (ref 10–50)
ANION GAP SERPL CALCULATED.3IONS-SCNC: 5 MMOL/L (ref 3–14)
AST SERPL W P-5'-P-CCNC: 26 U/L (ref 0–45)
BACTERIA SPEC CULT: NORMAL
BILIRUB SERPL-MCNC: 1.3 MG/DL (ref 0.2–1.3)
BLD GP AB SCN SERPL QL: NORMAL
BLD PROD TYP BPU: NORMAL
BLOOD BANK CMNT PATIENT-IMP: NORMAL
BUN SERPL-MCNC: 19 MG/DL (ref 7–30)
CALCIUM SERPL-MCNC: 8.2 MG/DL (ref 8.5–10.1)
CHLORIDE SERPL-SCNC: 118 MMOL/L (ref 94–109)
CO2 SERPL-SCNC: 23 MMOL/L (ref 20–32)
CREAT SERPL-MCNC: 1.46 MG/DL (ref 0.66–1.25)
ERYTHROCYTE [DISTWIDTH] IN BLOOD BY AUTOMATED COUNT: 19.1 % (ref 10–15)
GFR SERPL CREATININE-BSD FRML MDRD: 51 ML/MIN/{1.73_M2}
GLUCOSE SERPL-MCNC: 84 MG/DL (ref 70–99)
HCT VFR BLD AUTO: 26.7 % (ref 40–53)
HGB BLD-MCNC: 7.5 G/DL (ref 13.3–17.7)
HGB BLD-MCNC: 7.6 G/DL (ref 13.3–17.7)
HGB BLD-MCNC: 8 G/DL (ref 13.3–17.7)
HGB BLD-MCNC: 8.3 G/DL (ref 13.3–17.7)
INR PPP: 1.44 (ref 0.86–1.14)
LACTATE BLD-SCNC: 1.9 MMOL/L (ref 0.7–2)
Lab: NORMAL
MCH RBC QN AUTO: 27.5 PG (ref 26.5–33)
MCHC RBC AUTO-ENTMCNC: 31.1 G/DL (ref 31.5–36.5)
MCV RBC AUTO: 88 FL (ref 78–100)
NUM BPU REQUESTED: 2
PLATELET # BLD AUTO: 161 10E9/L (ref 150–450)
POTASSIUM SERPL-SCNC: 3.7 MMOL/L (ref 3.4–5.3)
PROT SERPL-MCNC: 6.3 G/DL (ref 6.8–8.8)
RBC # BLD AUTO: 3.02 10E12/L (ref 4.4–5.9)
SARS-COV-2 RNA SPEC QL NAA+PROBE: NOT DETECTED
SODIUM SERPL-SCNC: 146 MMOL/L (ref 133–144)
SPECIMEN EXP DATE BLD: NORMAL
SPECIMEN SOURCE: NORMAL
SPECIMEN SOURCE: NORMAL
WBC # BLD AUTO: 9.5 10E9/L (ref 4–11)

## 2020-08-14 PROCEDURE — 86922 COMPATIBILITY TEST ANTIGLOB: CPT | Performed by: INTERNAL MEDICINE

## 2020-08-14 PROCEDURE — 82140 ASSAY OF AMMONIA: CPT | Performed by: HOSPITALIST

## 2020-08-14 PROCEDURE — 85610 PROTHROMBIN TIME: CPT | Performed by: HOSPITALIST

## 2020-08-14 PROCEDURE — 85018 HEMOGLOBIN: CPT | Performed by: INTERNAL MEDICINE

## 2020-08-14 PROCEDURE — 86901 BLOOD TYPING SEROLOGIC RH(D): CPT | Performed by: INTERNAL MEDICINE

## 2020-08-14 PROCEDURE — 85018 HEMOGLOBIN: CPT | Performed by: HOSPITALIST

## 2020-08-14 PROCEDURE — 86850 RBC ANTIBODY SCREEN: CPT | Performed by: INTERNAL MEDICINE

## 2020-08-14 PROCEDURE — 85027 COMPLETE CBC AUTOMATED: CPT | Performed by: HOSPITALIST

## 2020-08-14 PROCEDURE — 99207 ZZC APP CREDIT; MD BILLING SHARED VISIT: CPT | Performed by: INTERNAL MEDICINE

## 2020-08-14 PROCEDURE — 86900 BLOOD TYPING SEROLOGIC ABO: CPT | Performed by: INTERNAL MEDICINE

## 2020-08-14 PROCEDURE — 25000125 ZZHC RX 250: Performed by: HOSPITALIST

## 2020-08-14 PROCEDURE — 99223 1ST HOSP IP/OBS HIGH 75: CPT | Mod: AI | Performed by: HOSPITALIST

## 2020-08-14 PROCEDURE — 25000132 ZZH RX MED GY IP 250 OP 250 PS 637: Performed by: INTERNAL MEDICINE

## 2020-08-14 PROCEDURE — 36415 COLL VENOUS BLD VENIPUNCTURE: CPT | Performed by: INTERNAL MEDICINE

## 2020-08-14 PROCEDURE — 12000000 ZZH R&B MED SURG/OB

## 2020-08-14 PROCEDURE — 25800030 ZZH RX IP 258 OP 636: Performed by: HOSPITALIST

## 2020-08-14 PROCEDURE — 36415 COLL VENOUS BLD VENIPUNCTURE: CPT | Performed by: HOSPITALIST

## 2020-08-14 PROCEDURE — 83605 ASSAY OF LACTIC ACID: CPT | Performed by: HOSPITALIST

## 2020-08-14 PROCEDURE — C9113 INJ PANTOPRAZOLE SODIUM, VIA: HCPCS | Performed by: HOSPITALIST

## 2020-08-14 PROCEDURE — 25000132 ZZH RX MED GY IP 250 OP 250 PS 637: Performed by: HOSPITALIST

## 2020-08-14 PROCEDURE — 80053 COMPREHEN METABOLIC PANEL: CPT | Performed by: HOSPITALIST

## 2020-08-14 PROCEDURE — 25000128 H RX IP 250 OP 636: Performed by: HOSPITALIST

## 2020-08-14 RX ORDER — MULTIPLE VITAMINS W/ MINERALS TAB 9MG-400MCG
1 TAB ORAL DAILY
Status: DISCONTINUED | OUTPATIENT
Start: 2020-08-15 | End: 2020-08-29 | Stop reason: HOSPADM

## 2020-08-14 RX ORDER — CEFTRIAXONE 1 G/1
1 INJECTION, POWDER, FOR SOLUTION INTRAMUSCULAR; INTRAVENOUS EVERY 24 HOURS
Status: DISCONTINUED | OUTPATIENT
Start: 2020-08-14 | End: 2020-08-15

## 2020-08-14 RX ORDER — IPRATROPIUM BROMIDE AND ALBUTEROL SULFATE 2.5; .5 MG/3ML; MG/3ML
3 SOLUTION RESPIRATORY (INHALATION) EVERY 4 HOURS PRN
Status: DISCONTINUED | OUTPATIENT
Start: 2020-08-14 | End: 2020-08-29 | Stop reason: HOSPADM

## 2020-08-14 RX ORDER — FOLIC ACID 1 MG/1
1 TABLET ORAL DAILY
Status: DISCONTINUED | OUTPATIENT
Start: 2020-08-15 | End: 2020-08-29 | Stop reason: HOSPADM

## 2020-08-14 RX ORDER — LANOLIN ALCOHOL/MO/W.PET/CERES
100 CREAM (GRAM) TOPICAL DAILY
Status: DISCONTINUED | OUTPATIENT
Start: 2020-08-15 | End: 2020-08-18

## 2020-08-14 RX ORDER — OLANZAPINE 5 MG/1
5 TABLET, ORALLY DISINTEGRATING ORAL ONCE
Status: COMPLETED | OUTPATIENT
Start: 2020-08-14 | End: 2020-08-14

## 2020-08-14 RX ORDER — LORAZEPAM 1 MG/1
1-2 TABLET ORAL EVERY 30 MIN PRN
Status: DISCONTINUED | OUTPATIENT
Start: 2020-08-14 | End: 2020-08-20

## 2020-08-14 RX ORDER — LORAZEPAM 2 MG/ML
1-2 INJECTION INTRAMUSCULAR EVERY 30 MIN PRN
Status: DISCONTINUED | OUTPATIENT
Start: 2020-08-14 | End: 2020-08-20

## 2020-08-14 RX ADMIN — MICONAZOLE NITRATE: 20 POWDER TOPICAL at 10:46

## 2020-08-14 RX ADMIN — OLANZAPINE 5 MG: 5 TABLET, ORALLY DISINTEGRATING ORAL at 22:17

## 2020-08-14 RX ADMIN — MICONAZOLE NITRATE: 20 POWDER TOPICAL at 22:06

## 2020-08-14 RX ADMIN — SODIUM CHLORIDE: 9 INJECTION, SOLUTION INTRAVENOUS at 10:08

## 2020-08-14 RX ADMIN — FOLIC ACID: 5 INJECTION, SOLUTION INTRAMUSCULAR; INTRAVENOUS; SUBCUTANEOUS at 01:05

## 2020-08-14 RX ADMIN — SODIUM CHLORIDE: 9 INJECTION, SOLUTION INTRAVENOUS at 21:23

## 2020-08-14 RX ADMIN — LORAZEPAM 2 MG: 2 INJECTION INTRAMUSCULAR; INTRAVENOUS at 21:20

## 2020-08-14 RX ADMIN — SODIUM CHLORIDE 8 MG/HR: 9 INJECTION, SOLUTION INTRAVENOUS at 22:21

## 2020-08-14 RX ADMIN — SODIUM CHLORIDE 8 MG/HR: 9 INJECTION, SOLUTION INTRAVENOUS at 10:08

## 2020-08-14 RX ADMIN — SODIUM CHLORIDE 8 MG/HR: 9 INJECTION, SOLUTION INTRAVENOUS at 00:50

## 2020-08-14 RX ADMIN — CEFTRIAXONE 1 G: 1 INJECTION, POWDER, FOR SOLUTION INTRAMUSCULAR; INTRAVENOUS at 15:22

## 2020-08-14 ASSESSMENT — ENCOUNTER SYMPTOMS
HEMATOLOGIC/LYMPHATIC NEGATIVE: 1
RESPIRATORY NEGATIVE: 1
CARDIOVASCULAR NEGATIVE: 1
CONSTITUTIONAL NEGATIVE: 1
NEUROLOGICAL NEGATIVE: 1
EYES NEGATIVE: 1
ALLERGIC/IMMUNOLOGIC NEGATIVE: 1
GASTROINTESTINAL NEGATIVE: 1
PSYCHIATRIC NEGATIVE: 1
MUSCULOSKELETAL NEGATIVE: 1

## 2020-08-14 ASSESSMENT — ACTIVITIES OF DAILY LIVING (ADL)
ADLS_ACUITY_SCORE: 22
ADLS_ACUITY_SCORE: 22
ADLS_ACUITY_SCORE: 19
ADLS_ACUITY_SCORE: 19
ADLS_ACUITY_SCORE: 23
ADLS_ACUITY_SCORE: 23

## 2020-08-14 NOTE — PROGRESS NOTES
Admission    Patient arrives to room 629 via cart from EMS.  Care plan note: started    Inpatient nursing criteria listed below were met:    PCD's Documented: Yes  Skin issues/needs documented :Yes  Isolation education started/completed NA  Patient allergies verified with patient: No  Verified completion of Foster Risk Assessment Tool:  Yes  Verified completion of Guardianship screening tool: Yes  Fall Prevention: Care plan updated, Education given and documented Yes  Care Plan initiated: Yes  Home medications documented in belongings flowsheet: NA  Patient belongings documented in belongings flowsheet: Yes  Reminder note (belongings/ medications) placed in discharge instructions:NA  Admission profile/ required documentation complete: No  Bedside Report Letter given and explained to patient Yes

## 2020-08-14 NOTE — PLAN OF CARE
Summary:     DATE & TIME: 08/14/2020 3288-9110  Cognitive Concerns/ Orientation : MARCIE lethargic,somnolent   BEHAVIOR & AGGRESSION TOOL COLOR: green  CIWA SCORE: MARCIE  ABNL VS/O2: VSS RA  MOBILITY: A 2/lift ( unable to evaluate pt was transfer from stretcher to bed with airmate and has been sleepy)  PAIN MANAGMENT: no sign of pain  DIET: NPO  BOWEL/BLADDER: incontinent   ABNL LAB/BG: hgb 8.3, cr 1.46, Na 146, INR 1.44 hematocrit  26.7  DRAIN/DEVICES: PIV X2 on R arm  TELEMETRY RHYTHM: NSR  SKIN: scattered bruises, scab, redness around groin area(miconazole powder requested)  TESTS/PROCEDURES:   D/C DAY/GOALS/PLACE: pending  OTHER IMPORTANT INFO: Gastroenterology consulted, chemical dependency and psychiatry consultation when pt is medically appropriate  COMMIT TO SIT DONE AND SIGNED OFF: yes

## 2020-08-14 NOTE — PROVIDER NOTIFICATION
MD Notification    Notified Person: MD    Notified Person Name: Kel     Notification Date/Time:8/14/2020 7497    Notification Interaction: paged MD    Purpose of Notification: Hgb 8.0, order to transfuse if =<8. Lab protocol with blood shortage is =<7, after pt has already received 1 unit. Please advise. Thanks    Orders Received: No signs of bleeding, hold unit, MD will assess    Comments:
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
## 2020-08-14 NOTE — PLAN OF CARE
DATE & TIME: 08/14/2020 4423-9337  Cognitive Concerns/ Orientation : A&Ox4  BEHAVIOR & AGGRESSION TOOL COLOR: green  CIWA SCORE: 2, 3, 3  ABNL VS/O2: VSS on RA  MOBILITY: Unable to assess, pt has severe muscle spasms/jerking which he is unable to control, cannot safely assess, states this is his baseline  PAIN MANAGMENT: Denies pain  DIET: NPO  BOWEL/BLADDER: incontinent of urine, no BM  ABNL LAB/BG: hgb 8.0 & 7.6, no signs of bleeding, order to transfuse =<7; INR 1.44  DRAIN/DEVICES: PIV infusing NS & protonix gtt  TELEMETRY RHYTHM: NSR, pt continuously pulling off tele patches  SKIN: scattered bruises, scabs, skin tear, redness to groin area- miconazole powder applied  TESTS/PROCEDURES: n/a  D/C DAY/GOALS/PLACE: pending  OTHER IMPORTANT INFO: GI following.

## 2020-08-14 NOTE — PROGRESS NOTES
[facility]    Hospitalist Progress Note    Date of Service (when I saw the patient): 08/14/2020    Assessment & Plan   Abhijeet Mccauley is a 59 year old male who presents with fall.    Falls  Encephalopathy, infectious vs metabolic  Concern for UTI  Ureteral stent as below  Hospitalized 7/2020 for UTI. With increasing confusion and falls. Recent alcohol treatment 7/4-8/5, began drinking immediately upon discharge. Confused on presentation. With at least 2 falls. 4-5 beers (at least) daily.  On presentation confusion and lethargy. Lactic acid elevated at 2.6. ammonia 54. WBC 13. UA with large blood and large leuest. Etoh 0.03 on admission. CT abdomen with cirrhosis with varices, small volume ascites, R 7th and 10th rib fractures. CT head/ c-spine negative for acute fracture  - blood culture pending  - urine culture pending  - ceftriaxone 1g IV q24 hours started 8/13  - lactate improved 1.9 8/14    Anemia  Unable to obtain history on admission.  ? Reported hemoccult positive. Hemoglobin on admission is 6.6.  INR on admission is 1.45.    - transfused x 1 unit in ED  - type and screen completed  - GI following, appreciate assistance  - IV PPI infusion followed by BID  - may need EGD pending clinical status  - check hgb this evening, consent is signed and in chart    Alcohol use disorder  H/o Etoh withdrawal seizures  Multiple recent admissions for Etoh related issues.   - seizure precautions  - CIWA  - prn lorazepam  - IV/PO vitamins  - eventually need psych/ CD involvement, ?Commitment?    Alcoholic cirrhosis  Concern for hepatic encephalopathy  Alk phos elevated but stable from previous. LFT's including bilirubin otherwise ok. No abdominal pain  - lactulose BID  - rifaximin 550 mg BID    Right 10th posterior rib fracture  Patient with CT on admission showing comminuted segmental fracture of right seventh rib with bilateral rib fractures otherwise stable, as well as right posterior rib fracture with  increased displacement.  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 Patient with CT on admission showing ureteric stent present.  - avoid nephrotoxins  - creatinine improved 1.46 8/14  - continue IV fluids for now     History of AML  Diagnosed 2008, s/p chemotherapy, complete remission >10 years  -Monitor    COPD  - Continue PTA inhalers, prn albuterol once med rec done  - prn duonebs available    MAGALIE  Await med rec    FEN (fluids, electrolytes and nutrition): NPO, IV fluids  Discussed with nursing, sister.  DVT Prophylaxis: Pneumatic Compression Devices  Code Status: Full Code    Disposition: Expected discharge in 2-3 days pending above workup    Adams Begum MD  [phone] (P)  Text Page    Interval History   Overnight events reviewed. States doing ok. With marked jerking in bed. Denies cp/sob. No other pain    -Data reviewed today: I reviewed all new labs and imaging results over the last 24 hours. I personally reviewed no images or EKG's today.    Physical Exam   Temp: 98.3  F (36.8  C) Temp src: Oral BP: (!) 146/91   Heart Rate: 89 Resp: 18 SpO2: 99 % O2 Device: None (Room air)    Vitals:    08/14/20 0008   Weight: 76.5 kg (168 lb 10.4 oz)     Vital Signs with Ranges  Temp:  [97.3  F (36.3  C)-98.3  F (36.8  C)] 98.3  F (36.8  C)  Pulse:  [65-80] 65  Heart Rate:  [65-89] 89  Resp:  [12-20] 18  BP: ()/(50-91) 146/91  SpO2:  [95 %-100 %] 99 %  No intake/output data recorded.    Constitutional: Alert, oriented to year, otherwise confused  Respiratory: Lungs clear to auscultation bilaterally, limited exam  Cardiovascular: Regular rate and rhythm, no murmurs  GI: Soft, non-tender, non-disteneded, good bowel sounds  Skin/Integumen: No erythema, cyanosis or edema  Other: Marked jerking of arms and legs    Medications      pantoprazole (PROTONIX) infusion ADULT/PEDS GREATER than or EQUAL to 45 kg 8 mg/hr (08/14/20 1008)     sodium chloride 100 mL/hr at 08/14/20 1008       cefTRIAXone  1 g Intravenous Q24H     [START ON 8/15/2020] folic acid  1 mg Oral Daily     miconazole   Topical BID     [START ON 8/15/2020] multivitamin w/minerals  1 tablet Oral Daily     sodium chloride (PF)  3 mL Intracatheter Q8H     [START ON 8/15/2020] thiamine  100 mg Oral Daily       Data   Recent Labs   Lab 08/14/20  0943 08/14/20  0734 08/14/20  0034 08/13/20  1620   WBC  --   --  9.5 13.0*   HGB 7.6* 8.0* 8.3* 6.6*   MCV  --   --  88 89   PLT  --   --  161 182   INR  --   --  1.44* 1.45*   NA  --   --  146* 141   POTASSIUM  --   --  3.7 3.5   CHLORIDE  --   --  118* 112*   CO2  --   --  23 19*   BUN  --   --  19 20   CR  --   --  1.46* 1.68*   ANIONGAP  --   --  5 10   BEE  --   --  8.2* 8.4*   GLC  --   --  84 72   ALBUMIN  --   --  2.6* 2.5*   PROTTOTAL  --   --  6.3* 6.2*   BILITOTAL  --   --  1.3 1.1   ALKPHOS  --   --  470* 487*   ALT  --   --  25 26   AST  --   --  26 32   LIPASE  --   --   --  135       Recent Results (from the past 24 hour(s))   CT Head w/o Contrast    Narrative    EXAM: CT HEAD W/O CONTRAST, CT CERVICAL SPINE W/O CONTRAST  LOCATION: [facility]  DATE/TIME: 8/13/2020 5:50 PM    INDICATION: Falls, bruising.  COMPARISON: 07/17/2020.  TECHNIQUE:   HEAD CT: Without IV contrast. Multiplanar reformats. Dose reduction techniques were used.  CERVICAL SPINE CT: Routine without IV contrast. Multiplanar reformats. Dose reduction techniques were used.    FINDINGS:   HEAD CT:   INTRACRANIAL CONTENTS: No intracranial hemorrhage, extraaxial collection, or mass effect.  No CT evidence of acute infarct. Mild presumed chronic small vessel ischemic changes. Mild generalized volume loss. No hydrocephalus.     VISUALIZED ORBITS/SINUSES/MASTOIDS: No intraorbital abnormality. No paranasal sinus mucosal disease. No middle ear or mastoid  effusion.    BONES/SOFT TISSUES: No acute abnormality.    CERVICAL SPINE CT:   VERTEBRA: Vertebral body heights are normal. 0.2 cm anterolisthesis of C2 on C3 and C4 on C5. Sagittal alignment otherwise normal. Degenerative changes result in moderate right foraminal stenosis at C3-C4, moderate to severe bilateral foraminal stenosis   at C4-C5, moderate bilateral foraminal stenosis at C5-C6, and moderate bilateral foraminal stenosis at C6-C7.     CANAL/FORAMINA: No canal or neural foraminal stenosis.    PARASPINAL: No extraspinal abnormality. Visualized lung fields are clear.      Impression    IMPRESSION:  HEAD CT:  1.  No acute intracranial process.    CERVICAL SPINE CT:  1.  No CT evidence for acute fracture or post traumatic subluxation.  2.  Degenerative cervical spondylosis.   CT Cervical Spine w/o Contrast    Narrative    EXAM: CT HEAD W/O CONTRAST, CT CERVICAL SPINE W/O CONTRAST  LOCATION: [facility]  DATE/TIME: 8/13/2020 5:50 PM    INDICATION: Falls, bruising.  COMPARISON: 07/17/2020.  TECHNIQUE:   HEAD CT: Without IV contrast. Multiplanar reformats. Dose reduction techniques were used.  CERVICAL SPINE CT: Routine without IV contrast. Multiplanar reformats. Dose reduction techniques were used.    FINDINGS:   HEAD CT:   INTRACRANIAL CONTENTS: No intracranial hemorrhage, extraaxial collection, or mass effect.  No CT evidence of acute infarct. Mild presumed chronic small vessel ischemic changes. Mild generalized volume loss. No hydrocephalus.     VISUALIZED ORBITS/SINUSES/MASTOIDS: No intraorbital abnormality. No paranasal sinus mucosal disease. No middle ear or mastoid effusion.    BONES/SOFT TISSUES: No acute abnormality.    CERVICAL SPINE CT:   VERTEBRA: Vertebral body heights are normal. 0.2 cm anterolisthesis of C2 on C3 and C4 on C5. Sagittal alignment otherwise normal. Degenerative changes result in moderate right foraminal stenosis at C3-C4, moderate to severe bilateral foraminal stenosis    at C4-C5, moderate bilateral foraminal stenosis at C5-C6, and moderate bilateral foraminal stenosis at C6-C7.     CANAL/FORAMINA: No canal or neural foraminal stenosis.    PARASPINAL: No extraspinal abnormality. Visualized lung fields are clear.      Impression    IMPRESSION:  HEAD CT:  1.  No acute intracranial process.    CERVICAL SPINE CT:  1.  No CT evidence for acute fracture or post traumatic subluxation.  2.  Degenerative cervical spondylosis.   Abd/pelvis CT - no contrast - Stone Protocol    Narrative    EXAM: CT ABDOMEN PELVIS W/O CONTRAST  LOCATION: [facility]  DATE/TIME: 8/13/2020 6:06 PM    INDICATION: Pain. History of urolithiasis.  COMPARISON: 07/17/2020.  TECHNIQUE: CT scan of the abdomen and pelvis was performed without IV contrast. Multiplanar reformats were obtained. Dose reduction techniques were used.  CONTRAST: None.    FINDINGS:   LOWER CHEST: Comminuted segmental fracture of the right 7th rib again seen. Healing bilateral fractures. Increased displacement of the right 10th posterior rib fracture.    HEPATOBILIARY: Cirrhosis with multiple varices including the gastroesophageal junction. A few calcified gallstones. Small volume ascites has developed.    PANCREAS: Normal.    SPLEEN: Normal.    ADRENAL GLANDS: Normal.    KIDNEYS/BLADDER: Double-J left ureteric stent again seen with a few small probable stones adjacent to the distal aspect of the stent. No hydronephrosis. There are multiple calcifications throughout the left kidney which are stable.    BOWEL: Stable mesenteric congestion in the right midabdomen. Motion.    LYMPH NODES: Normal.    VASCULATURE: Unremarkable.    PELVIC ORGANS: Normal.    MUSCULOSKELETAL: Left hip arthroplasty.      Impression    IMPRESSION:   1.  Increased displacement of the right 10th posterior rib fracture. Comminuted segmental fracture right 7th rib with bilateral rib fractures otherwise stable.    2.  Double-J left ureteric stent. No left-sided  hydronephrosis. Stable multiple calcifications in left kidney including the medullary portion of the kidney with some cortical thinning. A few small stones adjacent to the distal left ureteric stent.    3.  Cholelithiasis.    4.  Cirrhosis with varices including the gastroesophageal junction. Small volume ascites has developed.
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
## 2020-08-15 LAB
ANION GAP SERPL CALCULATED.3IONS-SCNC: 7 MMOL/L (ref 3–14)
ANISOCYTOSIS BLD QL SMEAR: ABNORMAL
BASOPHILS # BLD AUTO: 0 10E9/L (ref 0–0.2)
BASOPHILS NFR BLD AUTO: 0.3 %
BLD PROD TYP BPU: NORMAL
BLD UNIT ID BPU: 0
BLOOD PRODUCT CODE: NORMAL
BPU ID: NORMAL
BUN SERPL-MCNC: 18 MG/DL (ref 7–30)
CALCIUM SERPL-MCNC: 8.2 MG/DL (ref 8.5–10.1)
CHLORIDE SERPL-SCNC: 125 MMOL/L (ref 94–109)
CO2 SERPL-SCNC: 20 MMOL/L (ref 20–32)
CREAT SERPL-MCNC: 1.52 MG/DL (ref 0.66–1.25)
DIFFERENTIAL METHOD BLD: ABNORMAL
ELLIPTOCYTES BLD QL SMEAR: SLIGHT
EOSINOPHIL # BLD AUTO: 0.2 10E9/L (ref 0–0.7)
EOSINOPHIL NFR BLD AUTO: 3.3 %
ERYTHROCYTE [DISTWIDTH] IN BLOOD BY AUTOMATED COUNT: 19.8 % (ref 10–15)
GFR SERPL CREATININE-BSD FRML MDRD: 49 ML/MIN/{1.73_M2}
GLUCOSE BLDC GLUCOMTR-MCNC: 59 MG/DL (ref 70–99)
GLUCOSE BLDC GLUCOMTR-MCNC: 63 MG/DL (ref 70–99)
GLUCOSE BLDC GLUCOMTR-MCNC: 72 MG/DL (ref 70–99)
GLUCOSE BLDC GLUCOMTR-MCNC: 73 MG/DL (ref 70–99)
GLUCOSE SERPL-MCNC: 83 MG/DL (ref 70–99)
HCT VFR BLD AUTO: 23.3 % (ref 40–53)
HGB BLD-MCNC: 6.9 G/DL (ref 13.3–17.7)
IMM GRANULOCYTES # BLD: 0 10E9/L (ref 0–0.4)
IMM GRANULOCYTES NFR BLD: 0.1 %
LYMPHOCYTES # BLD AUTO: 1.3 10E9/L (ref 0.8–5.3)
LYMPHOCYTES NFR BLD AUTO: 18.8 %
MCH RBC QN AUTO: 26.6 PG (ref 26.5–33)
MCHC RBC AUTO-ENTMCNC: 29.6 G/DL (ref 31.5–36.5)
MCV RBC AUTO: 90 FL (ref 78–100)
MONOCYTES # BLD AUTO: 1.4 10E9/L (ref 0–1.3)
MONOCYTES NFR BLD AUTO: 20.1 %
NEUTROPHILS # BLD AUTO: 3.9 10E9/L (ref 1.6–8.3)
NEUTROPHILS NFR BLD AUTO: 57.4 %
PLATELET # BLD AUTO: 146 10E9/L (ref 150–450)
PLATELET # BLD EST: ABNORMAL 10*3/UL
POTASSIUM SERPL-SCNC: 3.3 MMOL/L (ref 3.4–5.3)
RBC # BLD AUTO: 2.59 10E12/L (ref 4.4–5.9)
RBC INCLUSIONS BLD: SLIGHT
SODIUM SERPL-SCNC: 152 MMOL/L (ref 133–144)
TRANSFUSION STATUS PATIENT QL: NORMAL
TRANSFUSION STATUS PATIENT QL: NORMAL
WBC # BLD AUTO: 6.9 10E9/L (ref 4–11)

## 2020-08-15 PROCEDURE — 94640 AIRWAY INHALATION TREATMENT: CPT

## 2020-08-15 PROCEDURE — 40000275 ZZH STATISTIC RCP TIME EA 10 MIN

## 2020-08-15 PROCEDURE — C9113 INJ PANTOPRAZOLE SODIUM, VIA: HCPCS | Performed by: HOSPITALIST

## 2020-08-15 PROCEDURE — 99233 SBSQ HOSP IP/OBS HIGH 50: CPT | Performed by: INTERNAL MEDICINE

## 2020-08-15 PROCEDURE — 25000132 ZZH RX MED GY IP 250 OP 250 PS 637: Performed by: HOSPITALIST

## 2020-08-15 PROCEDURE — 25800030 ZZH RX IP 258 OP 636: Performed by: HOSPITALIST

## 2020-08-15 PROCEDURE — 25000128 H RX IP 250 OP 636: Performed by: HOSPITALIST

## 2020-08-15 PROCEDURE — 25000125 ZZHC RX 250: Performed by: INTERNAL MEDICINE

## 2020-08-15 PROCEDURE — 80048 BASIC METABOLIC PNL TOTAL CA: CPT | Performed by: INTERNAL MEDICINE

## 2020-08-15 PROCEDURE — 12000000 ZZH R&B MED SURG/OB

## 2020-08-15 PROCEDURE — 36415 COLL VENOUS BLD VENIPUNCTURE: CPT | Performed by: INTERNAL MEDICINE

## 2020-08-15 PROCEDURE — P9016 RBC LEUKOCYTES REDUCED: HCPCS | Performed by: INTERNAL MEDICINE

## 2020-08-15 PROCEDURE — 00000146 ZZHCL STATISTIC GLUCOSE BY METER IP

## 2020-08-15 PROCEDURE — 85025 COMPLETE CBC W/AUTO DIFF WBC: CPT | Performed by: INTERNAL MEDICINE

## 2020-08-15 PROCEDURE — 25800030 ZZH RX IP 258 OP 636: Performed by: INTERNAL MEDICINE

## 2020-08-15 RX ADMIN — MICONAZOLE NITRATE: 20 POWDER TOPICAL at 08:30

## 2020-08-15 RX ADMIN — LORAZEPAM 2 MG: 2 INJECTION INTRAMUSCULAR; INTRAVENOUS at 23:48

## 2020-08-15 RX ADMIN — LORAZEPAM 1 MG: 2 INJECTION INTRAMUSCULAR; INTRAVENOUS at 08:15

## 2020-08-15 RX ADMIN — ACETAMINOPHEN 650 MG: 650 SUPPOSITORY RECTAL at 23:48

## 2020-08-15 RX ADMIN — DEXTROSE AND SODIUM CHLORIDE: 5; 900 INJECTION, SOLUTION INTRAVENOUS at 15:28

## 2020-08-15 RX ADMIN — LORAZEPAM 1 MG: 2 INJECTION INTRAMUSCULAR; INTRAVENOUS at 11:03

## 2020-08-15 RX ADMIN — LORAZEPAM 1 MG: 2 INJECTION INTRAMUSCULAR; INTRAVENOUS at 00:53

## 2020-08-15 RX ADMIN — DEXTROSE AND SODIUM CHLORIDE: 5; 900 INJECTION, SOLUTION INTRAVENOUS at 05:17

## 2020-08-15 RX ADMIN — LORAZEPAM 2 MG: 2 INJECTION INTRAMUSCULAR; INTRAVENOUS at 17:59

## 2020-08-15 RX ADMIN — LORAZEPAM 1 MG: 2 INJECTION INTRAMUSCULAR; INTRAVENOUS at 06:37

## 2020-08-15 RX ADMIN — ACETAMINOPHEN 650 MG: 650 SUPPOSITORY RECTAL at 02:14

## 2020-08-15 RX ADMIN — LORAZEPAM 1 MG: 2 INJECTION INTRAMUSCULAR; INTRAVENOUS at 15:28

## 2020-08-15 RX ADMIN — MICONAZOLE NITRATE: 20 POWDER TOPICAL at 22:02

## 2020-08-15 RX ADMIN — IPRATROPIUM BROMIDE AND ALBUTEROL SULFATE 3 ML: .5; 3 SOLUTION RESPIRATORY (INHALATION) at 21:25

## 2020-08-15 RX ADMIN — SODIUM CHLORIDE 8 MG/HR: 9 INJECTION, SOLUTION INTRAVENOUS at 19:54

## 2020-08-15 RX ADMIN — SODIUM CHLORIDE 8 MG/HR: 9 INJECTION, SOLUTION INTRAVENOUS at 09:37

## 2020-08-15 RX ADMIN — ACETAMINOPHEN 650 MG: 650 SUPPOSITORY RECTAL at 06:28

## 2020-08-15 ASSESSMENT — ACTIVITIES OF DAILY LIVING (ADL)
ADLS_ACUITY_SCORE: 22
ADLS_ACUITY_SCORE: 23
ADLS_ACUITY_SCORE: 22
ADLS_ACUITY_SCORE: 23
ADLS_ACUITY_SCORE: 22
ADLS_ACUITY_SCORE: 22

## 2020-08-15 NOTE — PROGRESS NOTES
[facility]  Gastroenterology Progress Note     Abhijeet Mccauley MRN# [id number]   YOB: 1960 Age: 59 year old          Assessment and Plan:     Anemia  Patient continues to drop hemoglobin slowly patient has multiple chronic health problem patient is extremely confused patient is thrashing in the bed disoriented his eyes are closed patient appears very restless patient was given medications for possible sleep or relaxing.  Patient is not able to communicate.  Continue current treatment plan from his mental status changes and neuropsychiatric standpoint.  Patient has slow drift in his hemoglobin I will recommend to repeat hemoglobin every 12 hours.  Type and cross and transfuse if needed.  Continue on IV Protonix.         Data Unavailable      Interval History:   doing well; no cp, sob, n/v/d, or abd pain.              Review of Systems:   C: NEGATIVE for fever, chills, change in weight  E/M: NEGATIVE for ear, mouth and throat problems  R: NEGATIVE for significant cough or SOB  CV: NEGATIVE for chest pain, palpitations or peripheral edema             Medications:   I have reviewed this patient's current medications    folic acid  1 mg Oral Daily     miconazole   Topical BID     multivitamin w/minerals  1 tablet Oral Daily     sodium chloride (PF)  3 mL Intracatheter Q8H     thiamine  100 mg Oral Daily                  Physical Exam:   Vitals were reviewed  Vital Signs with Ranges  Temp:  [95.3  F (35.2  C)-98.9  F (37.2  C)] 95.3  F (35.2  C)  Pulse:  [69-77] 69  Heart Rate:  [66-82] 70  Resp:  [16-20] 20  BP: (109-137)/(45-74) 137/74  SpO2:  [96 %-100 %] 100 %  I/O last 3 completed shifts:  In: 2229 [I.V.:1929]  Out: -   Constitutional: healthy, alert, no distress and uncooperative            Data:   I reviewed the patient's new clinical lab test results.   Recent Labs   Lab Test 08/15/20  0916 08/14/20  1829 08/14/20  0943  08/14/20  0034 08/13/20  1620  07/17/20  1124   WBC 6.9  --    --   --  9.5 13.0*   < > 11.1*   HGB 6.9* 7.5* 7.6*   < > 8.3* 6.6*   < > 8.8*   MCV 90  --   --   --  88 89   < > 95   *  --   --   --  161 182   < > 200   INR  --   --   --   --  1.44* 1.45*  --  1.48*    < > = values in this interval not displayed.     Recent Labs   Lab Test 08/15/20  0916 08/14/20  0034 08/13/20  1620   POTASSIUM 3.3* 3.7 3.5   CHLORIDE 125* 118* 112*   CO2 20 23 19*   BUN 18 19 20   ANIONGAP 7 5 10     Recent Labs   Lab Test 08/14/20 0034 08/13/20  1620 07/20/20  0845  07/18/20  0841 07/17/20  1639  07/11/20  1458  06/12/20  1005  03/05/19  0934   ALBUMIN 2.6* 2.5* 2.7*   < > 2.8*  --    < >  --    < > 2.9*   < > 3.5   BILITOTAL 1.3 1.1  --   --  0.8  --    < >  --    < > 1.9*   < > 2.2*   ALT 25 26  --   --  25  --    < >  --    < > 19   < > 18   AST 26 32  --   --  34  --    < >  --    < > 38   < > 38   PROTEIN  --  Negative  --   --   --  Negative  --  Negative   < >  --    < >  --    LIPASE  --  135  --   --   --   --   --   --   --  192  --  314    < > = values in this interval not displayed.       I reviewed the patient's new imaging results.    All laboratory data reviewed  All imaging studies reviewed by me.    Amol Fish MD,  8/15/2020  [facility]  Office : [phone]  Cell: [phone]

## 2020-08-15 NOTE — PROVIDER NOTIFICATION
MD Notification    Notified Person: MD    Notified Person Name: Dr. Ruvalcaba    Notification Date/Time: 8/15/20 4:53am    Notification Interaction: Phone    Purpose of Notification: BG 63, 59, Pt not a diabetic, he is NPO ex. meds    Orders Received:    Comments: IVF changed to D5NS at 100ml/hr

## 2020-08-15 NOTE — PROGRESS NOTES
Xcoverage: paged by RN because pt is agitated, stating he wants to leave; Zyprexa 5 mg X1 dose now.    Alondra Rose MD

## 2020-08-15 NOTE — PROVIDER NOTIFICATION
MD Notification    Notified Person: MD    Notified Person Name: Kel    Notification Date/Time:8/15/2020 1008    Notification Interaction: paged MD    Purpose of Notification: FYI: hgb 6.9, will give unit    Orders Received:    Comments:
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
## 2020-08-15 NOTE — PROGRESS NOTES
Cross cover note;  Called by the RN for hypoglycemia at 59 and 60 tonight blood sugars.  Patient is n.p.o. for anticipated EGD in a.m. Will add D5 to IV fluids D5 normal saline at 100 mL/h and continue to monitor blood sugars closely    Maria Luisa Ruvalcaba MD

## 2020-08-15 NOTE — PROVIDER NOTIFICATION
MD Notification    Notified Person: MD    Notified Person Name: Dr. Rose    Notification Date/Time: 8/14/20 8:43pm    Notification Interaction: Phone    Purpose of Notification: Pt angry, agitated because he is NPO; he wants water. He also wants to leave, stating that he will no stay here until 10pm    Orders Received: Zyprexa x1    Comments:

## 2020-08-15 NOTE — PLAN OF CARE
DATE & TIME: 08/15/2020 [id number]  Cognitive Concerns/ Orientation : A&Ox2, disoriented to time & situation; confused, illogical & garbled speech at times  BEHAVIOR & AGGRESSION TOOL COLOR: Yellow  CIWA SCORE: 9, 5, 9, 7, 10, 7, 13; ativan given x4 with some improvement  ABNL VS/O2: VSS on RA  MOBILITY: not OOB this shift, repositions self frequently, continues to have severe uncontrollable muscle spasms/jerking  PAIN MANAGMENT: denies pain  DIET: NPO; attempted sips of water, pt unable to swallow safely, does not follow commands well enough to swallow or close mouth  BOWEL/BLADDER: incontinent of urine; large loose BM x1  ABNL LAB/BG: hgb 6.9, one unit PRBCs given, pt tolerated well, recheck in am  DRAIN/DEVICES: PIV infusing D5NS & protonix  TELEMETRY RHYTHM: NSR  SKIN: scattered bruises, scabs, skin tear, redness to groin area- miconazole powder applied  TESTS/PROCEDURES: n/a  D/C DAY/GOALS/PLACE: pending  OTHER IMPORTANT INFO: Sitter at bedside d/t pt very restless, setting bed alarm off every few minutes and attempting to get out of bed. Muscle jerking seems slightly improved today, but still present; pt constantly restless. GI following.

## 2020-08-15 NOTE — PROGRESS NOTES
[facility]    Hospitalist Progress Note    Date of Service (when I saw the patient): 08/15/2020    Assessment & Plan   Abhijeet Mccauley is a 59 year old male who presents with fall.    Falls  Encephalopathy, infectious vs metabolic  Hospitalized 7/2020 for UTI. With increasing confusion and falls. Recent alcohol treatment 7/4-8/5, began drinking immediately upon discharge. Confused on presentation. With at least 2 falls. 4-5 beers (at least) daily.  On presentation confusion and lethargy. Lactic acid elevated at 2.6. ammonia 54. WBC 13. UA with large blood and large leuest. Etoh 0.03 on admission. CT abdomen with cirrhosis with varices, small volume ascites, R 7th and 10th rib fractures. CT head/ c-spine negative for acute fracture. he is afebrile and with no abdominal pain, making SBP unlikely.  - 8/13 blood culture NGTD 8/15  - urine culture negative  - ceftriaxone 1g IV q24 hours started 8/13, with negative urine and little evidence for SBP will discontinue abx and monitor clinically  - lactate improved 1.9 8/14  - HE management as below    Anemia  Known history of varices with banding. Unable to obtain history on admission.  ? Reported hemoccult positive. Hemoglobin on admission is 6.6.  INR on admission is 1.45.    - transfused x 1 unit in ED  - type and screen completed  - GI following, appreciate assistance  - IV PPI infusion followed by BID  - noted plans by GI for possible EGD, currently NPO  - check hgb this evening, consent is signed and in chart  - hgb stable 8/14 pm at 7.5, am 8/15 is pending at this time    Hypoglycemia  Noted overnight with BS to 62, 59. Started on D5W. Hypoglycemia 2/2 NPO status with underlying liver disease and impaired gluconeogenesis.   - continue D5 in fluids for now, likely discontinue when taking PO    Myoclonic jerking  Noted to have profound myoclonic jerking in bed. He denies pain. On review of previous notes this appears new. Likely 2/2 hepatic  encephalopathy +/- Etoh withdrawal  - treatment of HE as below  - the jerking component of his withdrawal/ HE appears to be improved. Now extremely restless    Alcohol use disorder  H/o Etoh withdrawal seizures  Longstanding h/o Etoh use/ abuse. Multiple recent admissions for Etoh related issues. H/o Etoh w/d seizures.   - seizure precautions  - CIWA scoring 10 this am 8/15  - prn lorazepam  - IV/PO vitamins  - eventually need psych/ CD involvement, ?Commitment?    Alcoholic cirrhosis  Concern for hepatic encephalopathy  Longstanding history of alcohol use disorder with resultant cirrhosis. LFT's ok except alk phos (see below). No abdominal pain. Ammonia on admission at 54, improved 8/14 to 29.   - lactulose BID as able  - rifaximin 550 mg BID as able  - await med rec for diuretic dosing    Elevated alk phos  Seen by GI inpatient 6/23. Suspected Alk phos elevation multifactorial, related to cirrhosis, recent fractures. At that time further workup  (EUS with liver biopsy) unlikely to  with ongoing active Etoh history and lack of follow up  - monitor for now    Right 10th posterior rib fracture  Patient with CT on admission showing comminuted segmental fracture of right seventh rib with bilateral rib fractures otherwise stable, as well as right posterior rib fracture with increased displacement.  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 Lino  Baseline creatinine appears to be in the 1.4-1.9 range as of recent, 1.68 on admission. Patient with CT on admission showing ureteric stent present.  - avoid nephrotoxins  - creatinine improved 1.46 8/14, labs 8/15 are pending  - continue IV fluids for now     History of AML  Diagnosed 2008, s/p chemotherapy, complete remission >10 years  -Monitor    COPD  - Continue PTA inhalers, prn albuterol once med rec done  - prn duonebs available    MAGALIE  Await med  rec    FEN (fluids, electrolytes and nutrition): NPO, IV fluids  Discussed with nursing, sister.  DVT Prophylaxis: Pneumatic Compression Devices  Code Status: Full Code    Disposition: Expected discharge in 3-5  days pending withdrawal, GI evaluation, improvement in HE    Adams Begum MD  [phone] (P)  Text Page    Interval History   Overnight events reviewed. Very agitated this am. Unable to obtain history given marked confusion    -Data reviewed today: I reviewed all new labs and imaging results over the last 24 hours. I personally reviewed no images or EKG's today.    Physical Exam   Temp: 97.4  F (36.3  C) Temp src: Axillary BP: 109/59 Pulse: 77 Heart Rate: 82 Resp: 18 SpO2: 97 % O2 Device: None (Room air)    Vitals:    08/14/20 0008 08/15/20 0042   Weight: 76.5 kg (168 lb 10.4 oz) 78.6 kg (173 lb 4.5 oz)     Vital Signs with Ranges  Temp:  [97.4  F (36.3  C)-98.9  F (37.2  C)] 97.4  F (36.3  C)  Pulse:  [77] 77  Heart Rate:  [66-89] 82  Resp:  [18-20] 18  BP: (109-146)/(47-91) 109/59  SpO2:  [96 %-99 %] 97 %  I/O last 3 completed shifts:  In: 1929 [I.V.:1929]  Out: -     Constitutional: Alert, confused  Respiratory: Lungs clear to auscultation bilaterally, limited exam  Cardiovascular: Regular rate and rhythm, no murmurs  GI: Soft, non-tender, non-disteneded, good bowel sounds  Skin/Integumen: No erythema, cyanosis or edema  Other: Very restless but moves all extremities    Medications     dextrose 5% and 0.9% NaCl 100 mL/hr at 08/15/20 0517     pantoprazole (PROTONIX) infusion ADULT/PEDS GREATER than or EQUAL to 45 kg 8 mg/hr (08/14/20 2221)       cefTRIAXone  1 g Intravenous Q24H     folic acid  1 mg Oral Daily     miconazole   Topical BID     multivitamin w/minerals  1 tablet Oral Daily     sodium chloride (PF)  3 mL Intracatheter Q8H     thiamine  100 mg Oral Daily       Data   Recent Labs   Lab 08/14/20  1829 08/14/20  0943 08/14/20  0734 08/14/20  0034 08/13/20  1620   WBC  --   --   --  9.5  13.0*   HGB 7.5* 7.6* 8.0* 8.3* 6.6*   MCV  --   --   --  88 89   PLT  --   --   --  161 182   INR  --   --   --  1.44* 1.45*   NA  --   --   --  146* 141   POTASSIUM  --   --   --  3.7 3.5   CHLORIDE  --   --   --  118* 112*   CO2  --   --   --  23 19*   BUN  --   --   --  19 20   CR  --   --   --  1.46* 1.68*   ANIONGAP  --   --   --  5 10   BEE  --   --   --  8.2* 8.4*   GLC  --   --   --  84 72   ALBUMIN  --   --   --  2.6* 2.5*   PROTTOTAL  --   --   --  6.3* 6.2*   BILITOTAL  --   --   --  1.3 1.1   ALKPHOS  --   --   --  470* 487*   ALT  --   --   --  25 26   AST  --   --   --  26 32   LIPASE  --   --   --   --  135       No results found for this or any previous visit (from the past 24 hour(s)).

## 2020-08-16 ENCOUNTER — APPOINTMENT (OUTPATIENT)
Dept: GENERAL RADIOLOGY | Facility: CLINIC | Age: 60
End: 2020-08-16
Attending: NURSE PRACTITIONER
Payer: COMMERCIAL

## 2020-08-16 LAB
ALBUMIN SERPL-MCNC: 2.7 G/DL (ref 3.4–5)
ALP SERPL-CCNC: 393 U/L (ref 40–150)
ALT SERPL W P-5'-P-CCNC: 30 U/L (ref 0–70)
ANION GAP SERPL CALCULATED.3IONS-SCNC: 5 MMOL/L (ref 3–14)
AST SERPL W P-5'-P-CCNC: 76 U/L (ref 0–45)
BASE DEFICIT BLDA-SCNC: 5.3 MMOL/L
BASOPHILS # BLD AUTO: 0 10E9/L (ref 0–0.2)
BASOPHILS NFR BLD AUTO: 0.4 %
BILIRUB SERPL-MCNC: 1.5 MG/DL (ref 0.2–1.3)
BUN SERPL-MCNC: 15 MG/DL (ref 7–30)
CALCIUM SERPL-MCNC: 8.3 MG/DL (ref 8.5–10.1)
CHLORIDE SERPL-SCNC: 128 MMOL/L (ref 94–109)
CO2 SERPL-SCNC: 19 MMOL/L (ref 20–32)
CREAT SERPL-MCNC: 1.48 MG/DL (ref 0.66–1.25)
DIFFERENTIAL METHOD BLD: ABNORMAL
EOSINOPHIL # BLD AUTO: 0.3 10E9/L (ref 0–0.7)
EOSINOPHIL NFR BLD AUTO: 3.6 %
ERYTHROCYTE [DISTWIDTH] IN BLOOD BY AUTOMATED COUNT: 19.1 % (ref 10–15)
GFR SERPL CREATININE-BSD FRML MDRD: 50 ML/MIN/{1.73_M2}
GLUCOSE BLDC GLUCOMTR-MCNC: 165 MG/DL (ref 70–99)
GLUCOSE BLDC GLUCOMTR-MCNC: 69 MG/DL (ref 70–99)
GLUCOSE BLDC GLUCOMTR-MCNC: 74 MG/DL (ref 70–99)
GLUCOSE BLDC GLUCOMTR-MCNC: 81 MG/DL (ref 70–99)
GLUCOSE BLDC GLUCOMTR-MCNC: 84 MG/DL (ref 70–99)
GLUCOSE BLDC GLUCOMTR-MCNC: 84 MG/DL (ref 70–99)
GLUCOSE SERPL-MCNC: 80 MG/DL (ref 70–99)
HCO3 BLD-SCNC: 20 MMOL/L (ref 21–28)
HCT VFR BLD AUTO: 28.2 % (ref 40–53)
HGB BLD-MCNC: 8.9 G/DL (ref 13.3–17.7)
HGB BLD-MCNC: 8.9 G/DL (ref 13.3–17.7)
IMM GRANULOCYTES # BLD: 0 10E9/L (ref 0–0.4)
IMM GRANULOCYTES NFR BLD: 0.3 %
LYMPHOCYTES # BLD AUTO: 1.4 10E9/L (ref 0.8–5.3)
LYMPHOCYTES NFR BLD AUTO: 19.1 %
MAGNESIUM SERPL-MCNC: 1.9 MG/DL (ref 1.6–2.3)
MCH RBC QN AUTO: 28.1 PG (ref 26.5–33)
MCHC RBC AUTO-ENTMCNC: 31.6 G/DL (ref 31.5–36.5)
MCV RBC AUTO: 89 FL (ref 78–100)
MONOCYTES # BLD AUTO: 1.4 10E9/L (ref 0–1.3)
MONOCYTES NFR BLD AUTO: 19.9 %
NEUTROPHILS # BLD AUTO: 4.1 10E9/L (ref 1.6–8.3)
NEUTROPHILS NFR BLD AUTO: 56.7 %
NRBC # BLD AUTO: 0 10*3/UL
NRBC BLD AUTO-RTO: 0 /100
NT-PROBNP SERPL-MCNC: 5477 PG/ML (ref 0–900)
OXYHGB MFR BLD: 48 % (ref 92–100)
PCO2 BLD: 36 MM HG (ref 35–45)
PH BLD: 7.35 PH (ref 7.35–7.45)
PLATELET # BLD AUTO: 169 10E9/L (ref 150–450)
PO2 BLD: 30 MM HG (ref 80–105)
POTASSIUM SERPL-SCNC: 4.4 MMOL/L (ref 3.4–5.3)
PROT SERPL-MCNC: 6.3 G/DL (ref 6.8–8.8)
RBC # BLD AUTO: 3.17 10E12/L (ref 4.4–5.9)
SODIUM SERPL-SCNC: 152 MMOL/L (ref 133–144)
TROPONIN I SERPL-MCNC: 0.07 UG/L (ref 0–0.04)
WBC # BLD AUTO: 7.2 10E9/L (ref 4–11)

## 2020-08-16 PROCEDURE — 36600 WITHDRAWAL OF ARTERIAL BLOOD: CPT

## 2020-08-16 PROCEDURE — 40000275 ZZH STATISTIC RCP TIME EA 10 MIN

## 2020-08-16 PROCEDURE — 83880 ASSAY OF NATRIURETIC PEPTIDE: CPT | Performed by: NURSE PRACTITIONER

## 2020-08-16 PROCEDURE — 36415 COLL VENOUS BLD VENIPUNCTURE: CPT | Performed by: INTERNAL MEDICINE

## 2020-08-16 PROCEDURE — 71045 X-RAY EXAM CHEST 1 VIEW: CPT

## 2020-08-16 PROCEDURE — 82805 BLOOD GASES W/O2 SATURATION: CPT | Performed by: NURSE PRACTITIONER

## 2020-08-16 PROCEDURE — 99207 ZZC CDG-MDM COMPONENT: MEETS MODERATE - UP CODED: CPT | Performed by: NURSE PRACTITIONER

## 2020-08-16 PROCEDURE — 25800030 ZZH RX IP 258 OP 636: Performed by: HOSPITALIST

## 2020-08-16 PROCEDURE — 25000128 H RX IP 250 OP 636: Performed by: HOSPITALIST

## 2020-08-16 PROCEDURE — 85018 HEMOGLOBIN: CPT | Performed by: INTERNAL MEDICINE

## 2020-08-16 PROCEDURE — 94640 AIRWAY INHALATION TREATMENT: CPT

## 2020-08-16 PROCEDURE — 25000128 H RX IP 250 OP 636: Performed by: NURSE PRACTITIONER

## 2020-08-16 PROCEDURE — 12000000 ZZH R&B MED SURG/OB

## 2020-08-16 PROCEDURE — 85025 COMPLETE CBC W/AUTO DIFF WBC: CPT | Performed by: NURSE PRACTITIONER

## 2020-08-16 PROCEDURE — 25800030 ZZH RX IP 258 OP 636: Performed by: INTERNAL MEDICINE

## 2020-08-16 PROCEDURE — C9113 INJ PANTOPRAZOLE SODIUM, VIA: HCPCS | Performed by: HOSPITALIST

## 2020-08-16 PROCEDURE — 99233 SBSQ HOSP IP/OBS HIGH 50: CPT | Performed by: INTERNAL MEDICINE

## 2020-08-16 PROCEDURE — 84484 ASSAY OF TROPONIN QUANT: CPT | Performed by: NURSE PRACTITIONER

## 2020-08-16 PROCEDURE — 00000146 ZZHCL STATISTIC GLUCOSE BY METER IP

## 2020-08-16 PROCEDURE — 80053 COMPREHEN METABOLIC PANEL: CPT | Performed by: NURSE PRACTITIONER

## 2020-08-16 PROCEDURE — 25000125 ZZHC RX 250: Performed by: INTERNAL MEDICINE

## 2020-08-16 PROCEDURE — 99233 SBSQ HOSP IP/OBS HIGH 50: CPT | Performed by: NURSE PRACTITIONER

## 2020-08-16 PROCEDURE — 83735 ASSAY OF MAGNESIUM: CPT | Performed by: NURSE PRACTITIONER

## 2020-08-16 PROCEDURE — 25800025 ZZH RX 258: Performed by: INTERNAL MEDICINE

## 2020-08-16 RX ORDER — MAGNESIUM SULFATE HEPTAHYDRATE 40 MG/ML
2 INJECTION, SOLUTION INTRAVENOUS ONCE
Status: COMPLETED | OUTPATIENT
Start: 2020-08-16 | End: 2020-08-16

## 2020-08-16 RX ORDER — DEXTROSE MONOHYDRATE 25 G/50ML
50 INJECTION, SOLUTION INTRAVENOUS
Status: COMPLETED | OUTPATIENT
Start: 2020-08-16 | End: 2020-08-16

## 2020-08-16 RX ORDER — FUROSEMIDE 10 MG/ML
40 INJECTION INTRAMUSCULAR; INTRAVENOUS ONCE
Status: COMPLETED | OUTPATIENT
Start: 2020-08-16 | End: 2020-08-16

## 2020-08-16 RX ORDER — DEXTROSE MONOHYDRATE 50 MG/ML
INJECTION, SOLUTION INTRAVENOUS CONTINUOUS
Status: DISCONTINUED | OUTPATIENT
Start: 2020-08-16 | End: 2020-08-19

## 2020-08-16 RX ORDER — FUROSEMIDE 10 MG/ML
INJECTION INTRAMUSCULAR; INTRAVENOUS
Status: DISCONTINUED
Start: 2020-08-16 | End: 2020-08-16 | Stop reason: HOSPADM

## 2020-08-16 RX ORDER — ALBUTEROL SULFATE 90 UG/1
2 AEROSOL, METERED RESPIRATORY (INHALATION) EVERY 4 HOURS PRN
Status: DISCONTINUED | OUTPATIENT
Start: 2020-08-16 | End: 2020-08-29 | Stop reason: HOSPADM

## 2020-08-16 RX ORDER — OLANZAPINE 10 MG/2ML
5 INJECTION, POWDER, FOR SOLUTION INTRAMUSCULAR ONCE
Status: COMPLETED | OUTPATIENT
Start: 2020-08-16 | End: 2020-08-16

## 2020-08-16 RX ADMIN — FUROSEMIDE 40 MG: 10 INJECTION, SOLUTION INTRAMUSCULAR; INTRAVENOUS at 01:27

## 2020-08-16 RX ADMIN — LORAZEPAM 1 MG: 2 INJECTION INTRAMUSCULAR; INTRAVENOUS at 03:46

## 2020-08-16 RX ADMIN — LORAZEPAM 2 MG: 2 INJECTION INTRAMUSCULAR; INTRAVENOUS at 13:32

## 2020-08-16 RX ADMIN — MAGNESIUM SULFATE IN WATER 2 G: 40 INJECTION, SOLUTION INTRAVENOUS at 02:45

## 2020-08-16 RX ADMIN — MICONAZOLE NITRATE: 20 POWDER TOPICAL at 20:28

## 2020-08-16 RX ADMIN — LORAZEPAM 1 MG: 2 INJECTION INTRAMUSCULAR; INTRAVENOUS at 10:33

## 2020-08-16 RX ADMIN — MICONAZOLE NITRATE: 20 POWDER TOPICAL at 09:38

## 2020-08-16 RX ADMIN — LORAZEPAM 2 MG: 2 INJECTION INTRAMUSCULAR; INTRAVENOUS at 02:26

## 2020-08-16 RX ADMIN — LORAZEPAM 2 MG: 2 INJECTION INTRAMUSCULAR; INTRAVENOUS at 08:39

## 2020-08-16 RX ADMIN — SODIUM CHLORIDE 8 MG/HR: 9 INJECTION, SOLUTION INTRAVENOUS at 17:22

## 2020-08-16 RX ADMIN — SODIUM CHLORIDE 8 MG/HR: 9 INJECTION, SOLUTION INTRAVENOUS at 06:07

## 2020-08-16 RX ADMIN — DEXTROSE MONOHYDRATE 50 ML: 25 INJECTION, SOLUTION INTRAVENOUS at 13:13

## 2020-08-16 RX ADMIN — LORAZEPAM 2 MG: 2 INJECTION INTRAMUSCULAR; INTRAVENOUS at 11:46

## 2020-08-16 RX ADMIN — OLANZAPINE 5 MG: 10 INJECTION, POWDER, FOR SOLUTION INTRAMUSCULAR at 01:32

## 2020-08-16 RX ADMIN — IPRATROPIUM BROMIDE AND ALBUTEROL SULFATE 3 ML: .5; 3 SOLUTION RESPIRATORY (INHALATION) at 09:54

## 2020-08-16 RX ADMIN — DEXTROSE MONOHYDRATE: 50 INJECTION, SOLUTION INTRAVENOUS at 14:16

## 2020-08-16 ASSESSMENT — ACTIVITIES OF DAILY LIVING (ADL)
ADLS_ACUITY_SCORE: 24
ADLS_ACUITY_SCORE: 15.5

## 2020-08-16 NOTE — PLAN OF CARE
DATE & TIME: 08/16/2020 8990-2711  Cognitive Concerns/ Orientation : Disoriented x4, confused, restless, agitated, illogical/garbled speech  BEHAVIOR & AGGRESSION TOOL COLOR: Yellow  CIWA SCORE: 14, 11, 15, 15, 7; ativan given x4 per protocol, MD aware  ABNL VS/O2: VSS on RA; exp wheezes audible, PRN neb given, accessory muscle use, unable to give inhaler d/t pts inability to follow commands  MOBILITY: not OOB, total cares, repositions self frequently. Involuntary muscle spasms/jerking seem resolved, still very restless  PAIN MANAGMENT: denies pain  DIET: NPO, unable to swallow sips  BOWEL/BLADDER: incontinent of urine & stool, no BM today   ABNL LAB/BG: Hgb 8.9; BG 69 -- IV dextrose given, recheck 165, then 84  DRAIN/DEVICES: PIV x3  TELEMETRY RHYTHM: SD, dinesh  SKIN: scattered bruises, scabs, skin tear, redness to groin area- miconazole powder applied, improving   TESTS/PROCEDURES: n/a  D/C DAY/GOALS/PLACE: pending  OTHER IMPORTANT INFO: Sitter at bedside. Seizure precaution in place. GI following.

## 2020-08-16 NOTE — CODE/RAPID RESPONSE
[facility]    House [place] RRT Note  8/16/2020   Time Called: 0102    RRT called for: Respiratory distress    Assessment & Plan     Acute hypoxic respiratory failure suspect multifactorial 2/2 iatrogenic volume overload (1 U PRBC 8/15, TRALI?), atelectasis in setting of COPD, cirrhosis, CKD III, rib fractures.  - Upon arrival, pt lying in bed, eyes closed, audible upper airway expiratory wheezes noted, restless, pulling at lines.  Per nursing, pt remains restless; however, now with worsening respiratory status.  Pt's O2 sats 97% on 2L O2 via NC, RR 20s, HR 70s, SBP 140s.  Pt's lungs clear throughout; however, upper airway expiratory wheezes resonating throughout lung fields.  Pt does not follow commands, minimally opens eyes to voice, moving all extremities equally, no peripheral edema noted.      INTERVENTIONS:  - Stat CXR  - Stat ABG - likely VBG   - Stat BNP, CBC, CMP, trop, Mg  - Continuous pulse oximetry   - Will discontinue MIVF  - Will check blood glucose Q4H while off D5  - Zyprexa 5 mg IM now for noted agitation (considered ativan for elevated CIWA; however, given concern for respiratory suppression and history of withdrawal seizures, will trial zyprexa)  - Lasix 40 mg IV now - noted pt on lasix 20 mg PO PTA; has not received since admission  - Will resume PTA Breo ellipta inhaler, has not received this admission  - Encourage IS and C&DB exercises as able  - 2 g Mg sulfate now    At the end of the RRT pt protecting airway, O2 sats 100% on 2L O2 via NC; however, noted with a prolonged expiratory phase.      Discussed with and defer further cares to nursing and hospitalist     Interval History     Abhijeet Mccauley is a 59 year old male who was admitted on 8/13/2020 for fall.    Medical history significant for: Esophageal varices, alcohol use disorder with withdrawal seizures and cirrhosis, CKD III, AML, COPD, MAGALIE    Code Status: Full Code    Allergies   Allergies   Allergen Reactions     Blood  Transfusion Related (Informational Only) Other (See Comments)     Patient has a history of a clinically significant antibody against RBC antigens.  A delay in compatible RBCs may occur.     Famotidine GI Disturbance     Severe abdominal cramps     Pepcid GI Disturbance     Severe abdominal cramps     Vancomycin Visual Disturbance     Hallucinations     Cyclobenzaprine Hives     Hydrocodone-Acetaminophen Rash     Methocarbamol Dermatitis     Localized to face     Vfend Nausea and GI Disturbance     IV - cold sweats ; Iron tablet - nausea/stomach pain       Physical Exam   Vital Signs with Ranges:  Temp:  [95.3  F (35.2  C)-98.2  F (36.8  C)] 98.2  F (36.8  C)  Pulse:  [69-77] 69  Heart Rate:  [70-82] 74  Resp:  [16-22] 22  BP: (109-160)/(45-74) 144/73  SpO2:  [96 %-100 %] 98 %  I/O last 3 completed shifts:  In: 300   Out: -     Constitutional: Pt lying in bed, eyes closed, in mild-moderate respiratory distress, upper airway wheezes noted  Neck: Upper airway wheezes noted, no stridor noted  Pulmonary: In mild-moderate respiratory distress noted, tachypneic, prolonged expiratory phase, clear lung sounds throughout with noted expiratory wheezes resonating from upper airway throughout, no obvious crackles noted  Cardiovascular: Regular rate and rhythm, normal S1S2, no murmur, rub or gallop noted  GI: Round, distended, nontender to palpation, no obvious guarding or rebound tenderness noted  Skin/Integumen: Warm, dry, no mottling noted  Neuro: Eyes closed, PERRL, moving all extremities equally, no obvious focal neuro deficit noted  Psych:  Restless  Extremities: No peripheral edema noted    Data       ABG: Suspect venous  Recent Labs   Lab 08/16/20  0122   PH 7.35   PCO2 36   PO2 30*   HCO3 20*       IMAGING: (X-ray/CT/MRI)   Recent Results (from the past 24 hour(s))   XR Chest Port 1 View    Narrative    EXAM: XR CHEST PORT 1 VW  LOCATION: [facility]  DATE/TIME: 8/16/2020 1:26 AM    INDICATION: Shortness of  breath  COMPARISON: 06/25/2020      Impression    IMPRESSION: Cardiac enlargement. No vascular congestion or significant effusion. Mild left medial basilar atelectasis or infiltrate.       Recent Labs   Lab 08/16/20 0130   NTBNPI 5,477*       Recent Labs   Lab 08/16/20  0130 08/15/20  0916 08/14/20  1829  08/14/20  0034   WBC 7.2 6.9  --   --  9.5   HGB 8.9* 6.9* 7.5*   < > 8.3*   HCT 28.2* 23.3*  --   --  26.7*   MCV 89 90  --   --  88    146*  --   --  161    < > = values in this interval not displayed.       Recent Labs   Lab 08/16/20  0130 08/15/20  0916 08/14/20  0034 08/13/20  1620   * 152* 146* 141   POTASSIUM 4.4 3.3* 3.7 3.5   CHLORIDE 128* 125* 118* 112*   CO2 19* 20 23 19*   ANIONGAP 5 7 5 10   GLC 80 83 84 72   BUN 15 18 19 20   CR 1.48* 1.52* 1.46* 1.68*   GFRESTIMATED 50* 49* 51* 43*   GFRESTBLACK 58* 56* 59* 50*   BEE 8.3* 8.2* 8.2* 8.4*   PROTTOTAL 6.3*  --  6.3* 6.2*   ALBUMIN 2.7*  --  2.6* 2.5*   BILITOTAL 1.5*  --  1.3 1.1   ALKPHOS 393*  --  470* 487*   AST 76*  --  26 32   ALT 30  --  25 26       Recent Labs   Lab 08/16/20 0130   TROPI 0.070*       Time Spent on this Encounter   I spent 20 minutes on the unit/floor managing the care of Abhijeet Mccauley. Over 50% of my time was spent counseling the patient and/or coordinating care regarding services listed in this note.      LOPEZ Quinonez [facility] [place]

## 2020-08-16 NOTE — PLAN OF CARE
Cognitive Concerns/ Orientation : Somnolent, confused, restless, agitated, illogical speech at times  BEHAVIOR & AGGRESSION TOOL COLOR: Yellow  CIWA SCORE: 7,14, 6,14,12,7  ABNL VS/O2: Elevated /68, 140s/60s-70s, other VSS on 2L  MOBILITY: Assist of 2 during cares; turns/ repositions self frequently. Involuntary muscle spasms/jerking much improved. Very restless  PAIN MANAGMENT: PRN Tylenol supp x1  DIET: NPO ex. Meds  BOWEL/BLADDER: incontinent of urine  ABNL LAB/BG: Hgb 8.9 BG 80, 81, 74  DRAIN/DEVICES: PIVs x3  TELEMETRY RHYTHM: NSR  SKIN: scattered bruises, scabs, skin tear, redness to groin area- miconazole powder applied  TESTS/PROCEDURES: n/a  D/C DAY/GOALS/PLACE: pending  OTHER IMPORTANT INFO: Sitter at bedside. Seizure precaution in place. LS expiratory wheezes, coarse crackles, congested cough; RRT called overnight see house MD note. GI following.  BG checks  q4hrs

## 2020-08-16 NOTE — PROGRESS NOTES
[facility]    Hospitalist Progress Note    Date of Service (when I saw the patient): 08/16/2020    Assessment & Plan   Abhijeet Mccauley is a 59 year old male who presents with fall.    Falls  Encephalopathy, likely metabolic and alcohol withdrawal  Hospitalized 7/2020 for UTI. With increasing confusion and falls. Recent alcohol treatment 7/4-8/5, began drinking immediately upon discharge. Confused on presentation. With at least 2 falls. 4-5 beers (at least) daily.  On presentation confusion and lethargy. Lactic acid elevated at 2.6. ammonia 54. WBC 13. UA with large blood and large leuest. Etoh 0.03 on admission. CT abdomen with cirrhosis with varices, small volume ascites, R 7th and 10th rib fractures. CT head/ c-spine negative for acute fracture. he is afebrile and with no abdominal pain, making SBP unlikely.  - 8/13 blood culture NGTD 8/16  - urine culture negative  - ceftriaxone 1g IV q24 hours started 8/13, with negative urine and little evidence for SBP will discontinue abx and monitor clinically  - lactate improved 1.9 8/14  - HE management as below  - difficult situation as multifactorial encephalopathy. He needs lactulose but unable to take PO, NG not option with restlessness nor are retention enemas. In acute alcohol withdrawal as well. Hypernatremia with respiratory distress last night (although not hypoxic, CXR/ lungs clear). Will take him through withdrawal and hopefully get him to place where he can take lactulose/rifaximin    Respiratory distress  Type II NSTEMI  Early am 8/16 with respiratory distress. Audible expiratory wheezes appreciated. Otherwise lung sounds clear. O2 sats 97% on 2L. Vitals otherwise stable. CXR clear, BNP elevated 5477. Troponin detectable at 0.070. VBG 7.35/36/30  - furosemide 40 mg x 1 given  - repeat troponin now and in am  - recent echo 6/2020 with EF 55-60, mild concentric LV hypertrophy. Will monitor for repeat need  - suspect underlying COPD +/- acute  HFpEF. Reassuring that CXR is clear and lungs clear    Hypernatremia  Na to 152 am 8/16.   - noted respiratory distress but need to correct sodium in the setting of hepatic encephalopathy  - D5W at 75 ml/ hour, monitoring respiratory status closely    Anemia  Known history of varices with banding. Unable to obtain history on admission.  ? Reported hemoccult positive. Hemoglobin on admission is 6.6.  INR on admission is 1.45.    - transfused x 1 unit in ED  - type and screen completed  - GI following, appreciate assistance  - IV PPI infusion followed by BID  - noted plans by GI for possible EGD, currently NPO  - check hgb this evening, consent is signed and in chart  - q12 hour hgb, 8.9 am 8/16    Hypoglycemia  Noted overnight with BS to 62, 59. Started on D5W. Hypoglycemia 2/2 NPO status with underlying liver disease and impaired gluconeogenesis.   - d50 as needed  - D5W for hypernatremia    Myoclonic jerking  Noted to have profound myoclonic jerking in bed. He denies pain. On review of previous notes this appears new. Likely 2/2 hepatic encephalopathy +/- Etoh withdrawal  - treatment of HE as below  - jerking resolved    Alcohol use disorder  H/o Etoh withdrawal seizures  Longstanding h/o Etoh use/ abuse. Multiple recent admissions for Etoh related issues. H/o Etoh w/d seizures.   - seizure precautions  - CIWA scoring 15 8/16  - prn lorazepam  - IV/PO vitamins  - eventually need psych/ CD involvement, ?Commitment?    Alcoholic cirrhosis  Concern for hepatic encephalopathy  Longstanding history of alcohol use disorder with resultant cirrhosis. LFT's ok except alk phos (see below). No abdominal pain. Ammonia on admission at 54, improved 8/14 to 29.   - lactulose BID as able  - rifaximin 550 mg BID as able  - hold on prior to admission diuretics (furosemide, spironolactone)    Elevated alk phos  Seen by GI inpatient 6/23. Suspected Alk phos elevation multifactorial, related to cirrhosis, recent fractures. At that time  further workup  (EUS with liver biopsy) unlikely to  with ongoing active Etoh history and lack of follow up  - monitor for now    Right 10th posterior rib fracture  Patient with CT on admission showing comminuted segmental fracture of right seventh rib with bilateral rib fractures otherwise stable, as well as right posterior rib fracture with increased displacement.  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 Lino  Baseline creatinine appears to be in the 1.4-1.9 range as of recent, 1.68 on admission. Patient with CT on admission showing ureteric stent present.  - avoid nephrotoxins  - creatinine stable 8/16 at 1.48  - continue IV fluids for now     History of AML  Diagnosed 2008, s/p chemotherapy, complete remission >10 years  -Monitor    COPD  - Continue PTA inhaler Advair (Breo)  500-50 1 puff daily  - prn duonebs available    MAGALIE  Hold medications    FEN (fluids, electrolytes and nutrition): NPO, D5W  Discussed with nursing, sister.  DVT Prophylaxis: Pneumatic Compression Devices  Code Status: Full Code    Disposition: Expected discharge unclear, very sick    Adams Begum MD  [phone] (P)  Text Page    Interval History   Overnight events reviewed. Ongoing agitation, not responding to questions    -Data reviewed today: I reviewed all new labs and imaging results over the last 24 hours. I personally reviewed no images or EKG's today.    Physical Exam   Temp: 97.3  F (36.3  C) Temp src: Axillary BP: (!) 146/76 Pulse: 84 Heart Rate: 82 Resp: 24 SpO2: 98 % O2 Device: None (Room air) Oxygen Delivery: 1.5 LPM  Vitals:    08/14/20 0008 08/15/20 0042   Weight: 76.5 kg (168 lb 10.4 oz) 78.6 kg (173 lb 4.5 oz)     Vital Signs with Ranges  Temp:  [96.3  F (35.7  C)-98.2  F (36.8  C)] 97.3  F (36.3  C)  Pulse:  [84-86] 84  Heart Rate:  [70-82] 82  Resp:  [18-28] 24  BP: (140-160)/(68-76)  146/76  SpO2:  [96 %-100 %] 98 %  I/O last 3 completed shifts:  In: 300   Out: 650 [Urine:650]    Constitutional: Alert, confused  Respiratory: Lungs clear to auscultation bilaterally, no wheezing or crackles  Cardiovascular: Regular rate and rhythm, no murmurs. No edema  GI: Soft, non-tender, non-disteneded, good bowel sounds  Skin/Integumen: No erythema  Other: Very restless but moves all extremities    Medications     pantoprazole (PROTONIX) infusion ADULT/PEDS GREATER than or EQUAL to 45 kg 8 mg/hr (08/16/20 0607)       fluticasone-vilanterol  1 puff Inhalation Daily     folic acid  1 mg Oral Daily     miconazole   Topical BID     multivitamin w/minerals  1 tablet Oral Daily     sodium chloride (PF)  3 mL Intracatheter Q8H     thiamine  100 mg Oral Daily       Data   Recent Labs   Lab 08/16/20  0130 08/15/20  0916 08/14/20  1829  08/14/20  0034 08/13/20  1620   WBC 7.2 6.9  --   --  9.5 13.0*   HGB 8.9* 6.9* 7.5*   < > 8.3* 6.6*   MCV 89 90  --   --  88 89    146*  --   --  161 182   INR  --   --   --   --  1.44* 1.45*   * 152*  --   --  146* 141   POTASSIUM 4.4 3.3*  --   --  3.7 3.5   CHLORIDE 128* 125*  --   --  118* 112*   CO2 19* 20  --   --  23 19*   BUN 15 18  --   --  19 20   CR 1.48* 1.52*  --   --  1.46* 1.68*   ANIONGAP 5 7  --   --  5 10   BEE 8.3* 8.2*  --   --  8.2* 8.4*   GLC 80 83  --   --  84 72   ALBUMIN 2.7*  --   --   --  2.6* 2.5*   PROTTOTAL 6.3*  --   --   --  6.3* 6.2*   BILITOTAL 1.5*  --   --   --  1.3 1.1   ALKPHOS 393*  --   --   --  470* 487*   ALT 30  --   --   --  25 26   AST 76*  --   --   --  26 32   LIPASE  --   --   --   --   --  135   TROPI 0.070*  --   --   --   --   --     < > = values in this interval not displayed.       Recent Results (from the past 24 hour(s))   XR Chest Port 1 View    Narrative    EXAM: XR CHEST PORT 1 VW  LOCATION: [facility]  DATE/TIME: 8/16/2020 1:26 AM    INDICATION: Shortness of breath  COMPARISON: 06/25/2020       Impression    IMPRESSION: Cardiac enlargement. No vascular congestion or significant effusion. Mild left medial basilar atelectasis or infiltrate.

## 2020-08-17 ENCOUNTER — TELEPHONE (OUTPATIENT)
Dept: FAMILY MEDICINE | Facility: CLINIC | Age: 60
End: 2020-08-17

## 2020-08-17 ENCOUNTER — MEDICAL CORRESPONDENCE (OUTPATIENT)
Dept: HEALTH INFORMATION MANAGEMENT | Facility: CLINIC | Age: 60
End: 2020-08-17

## 2020-08-17 LAB
ALBUMIN SERPL-MCNC: 2.7 G/DL (ref 3.4–5)
ALP SERPL-CCNC: 391 U/L (ref 40–150)
ALT SERPL W P-5'-P-CCNC: 25 U/L (ref 0–70)
ANION GAP SERPL CALCULATED.3IONS-SCNC: 6 MMOL/L (ref 3–14)
AST SERPL W P-5'-P-CCNC: 34 U/L (ref 0–45)
BASOPHILS # BLD AUTO: 0 10E9/L (ref 0–0.2)
BASOPHILS NFR BLD AUTO: 0.2 %
BILIRUB SERPL-MCNC: 1.7 MG/DL (ref 0.2–1.3)
BLD PROD TYP BPU: NORMAL
BLD UNIT ID BPU: 0
BLOOD PRODUCT CODE: NORMAL
BPU ID: NORMAL
BUN SERPL-MCNC: 12 MG/DL (ref 7–30)
CALCIUM SERPL-MCNC: 8.5 MG/DL (ref 8.5–10.1)
CHLORIDE SERPL-SCNC: 119 MMOL/L (ref 94–109)
CO2 SERPL-SCNC: 24 MMOL/L (ref 20–32)
CREAT SERPL-MCNC: 1.51 MG/DL (ref 0.66–1.25)
DIFFERENTIAL METHOD BLD: ABNORMAL
EOSINOPHIL # BLD AUTO: 0.4 10E9/L (ref 0–0.7)
EOSINOPHIL NFR BLD AUTO: 4.7 %
ERYTHROCYTE [DISTWIDTH] IN BLOOD BY AUTOMATED COUNT: 20.1 % (ref 10–15)
GFR SERPL CREATININE-BSD FRML MDRD: 49 ML/MIN/{1.73_M2}
GLUCOSE BLDC GLUCOMTR-MCNC: 103 MG/DL (ref 70–99)
GLUCOSE BLDC GLUCOMTR-MCNC: 87 MG/DL (ref 70–99)
GLUCOSE BLDC GLUCOMTR-MCNC: 93 MG/DL (ref 70–99)
GLUCOSE BLDC GLUCOMTR-MCNC: 96 MG/DL (ref 70–99)
GLUCOSE BLDC GLUCOMTR-MCNC: 98 MG/DL (ref 70–99)
GLUCOSE SERPL-MCNC: 101 MG/DL (ref 70–99)
GLUCOSE SERPL-MCNC: 93 MG/DL (ref 70–99)
HCT VFR BLD AUTO: 31.3 % (ref 40–53)
HGB BLD-MCNC: 9.7 G/DL (ref 13.3–17.7)
IMM GRANULOCYTES # BLD: 0 10E9/L (ref 0–0.4)
IMM GRANULOCYTES NFR BLD: 0.1 %
LYMPHOCYTES # BLD AUTO: 1.5 10E9/L (ref 0.8–5.3)
LYMPHOCYTES NFR BLD AUTO: 17.5 %
MCH RBC QN AUTO: 27.6 PG (ref 26.5–33)
MCHC RBC AUTO-ENTMCNC: 31 G/DL (ref 31.5–36.5)
MCV RBC AUTO: 89 FL (ref 78–100)
MONOCYTES # BLD AUTO: 1.4 10E9/L (ref 0–1.3)
MONOCYTES NFR BLD AUTO: 15.9 %
NEUTROPHILS # BLD AUTO: 5.4 10E9/L (ref 1.6–8.3)
NEUTROPHILS NFR BLD AUTO: 61.6 %
PLATELET # BLD AUTO: 151 10E9/L (ref 150–450)
POTASSIUM SERPL-SCNC: 3.1 MMOL/L (ref 3.4–5.3)
POTASSIUM SERPL-SCNC: 3.5 MMOL/L (ref 3.4–5.3)
PROT SERPL-MCNC: 6.6 G/DL (ref 6.8–8.8)
RBC # BLD AUTO: 3.52 10E12/L (ref 4.4–5.9)
SODIUM SERPL-SCNC: 149 MMOL/L (ref 133–144)
TRANSFUSION STATUS PATIENT QL: NORMAL
TRANSFUSION STATUS PATIENT QL: NORMAL
WBC # BLD AUTO: 8.8 10E9/L (ref 4–11)

## 2020-08-17 PROCEDURE — 85025 COMPLETE CBC W/AUTO DIFF WBC: CPT | Performed by: INTERNAL MEDICINE

## 2020-08-17 PROCEDURE — 12000000 ZZH R&B MED SURG/OB

## 2020-08-17 PROCEDURE — 36415 COLL VENOUS BLD VENIPUNCTURE: CPT | Performed by: INTERNAL MEDICINE

## 2020-08-17 PROCEDURE — 80053 COMPREHEN METABOLIC PANEL: CPT | Performed by: INTERNAL MEDICINE

## 2020-08-17 PROCEDURE — 25800030 ZZH RX IP 258 OP 636: Performed by: HOSPITALIST

## 2020-08-17 PROCEDURE — 25000128 H RX IP 250 OP 636: Performed by: HOSPITALIST

## 2020-08-17 PROCEDURE — C9113 INJ PANTOPRAZOLE SODIUM, VIA: HCPCS | Performed by: HOSPITALIST

## 2020-08-17 PROCEDURE — 25000128 H RX IP 250 OP 636: Performed by: INTERNAL MEDICINE

## 2020-08-17 PROCEDURE — 00000146 ZZHCL STATISTIC GLUCOSE BY METER IP

## 2020-08-17 PROCEDURE — 84132 ASSAY OF SERUM POTASSIUM: CPT | Performed by: INTERNAL MEDICINE

## 2020-08-17 PROCEDURE — 25000125 ZZHC RX 250: Performed by: INTERNAL MEDICINE

## 2020-08-17 PROCEDURE — 82947 ASSAY GLUCOSE BLOOD QUANT: CPT | Performed by: INTERNAL MEDICINE

## 2020-08-17 PROCEDURE — 25800030 ZZH RX IP 258 OP 636: Performed by: INTERNAL MEDICINE

## 2020-08-17 PROCEDURE — 99233 SBSQ HOSP IP/OBS HIGH 50: CPT | Performed by: INTERNAL MEDICINE

## 2020-08-17 PROCEDURE — C9113 INJ PANTOPRAZOLE SODIUM, VIA: HCPCS | Performed by: INTERNAL MEDICINE

## 2020-08-17 RX ORDER — POTASSIUM CHLORIDE 1.5 G/1.58G
20-40 POWDER, FOR SOLUTION ORAL
Status: DISCONTINUED | OUTPATIENT
Start: 2020-08-17 | End: 2020-08-29 | Stop reason: HOSPADM

## 2020-08-17 RX ORDER — POTASSIUM CHLORIDE 7.45 MG/ML
10 INJECTION INTRAVENOUS
Status: DISCONTINUED | OUTPATIENT
Start: 2020-08-17 | End: 2020-08-29 | Stop reason: HOSPADM

## 2020-08-17 RX ORDER — POTASSIUM CHLORIDE 29.8 MG/ML
20 INJECTION INTRAVENOUS
Status: DISCONTINUED | OUTPATIENT
Start: 2020-08-17 | End: 2020-08-29 | Stop reason: HOSPADM

## 2020-08-17 RX ORDER — POTASSIUM CL/LIDO/0.9 % NACL 10MEQ/0.1L
10 INTRAVENOUS SOLUTION, PIGGYBACK (ML) INTRAVENOUS
Status: DISCONTINUED | OUTPATIENT
Start: 2020-08-17 | End: 2020-08-29 | Stop reason: HOSPADM

## 2020-08-17 RX ORDER — POTASSIUM CHLORIDE 1500 MG/1
20-40 TABLET, EXTENDED RELEASE ORAL
Status: DISCONTINUED | OUTPATIENT
Start: 2020-08-17 | End: 2020-08-29 | Stop reason: HOSPADM

## 2020-08-17 RX ADMIN — LORAZEPAM 1 MG: 2 INJECTION INTRAMUSCULAR; INTRAVENOUS at 02:14

## 2020-08-17 RX ADMIN — MICONAZOLE NITRATE: 20 POWDER TOPICAL at 22:08

## 2020-08-17 RX ADMIN — Medication 10 MEQ: at 13:20

## 2020-08-17 RX ADMIN — DEXTROSE MONOHYDRATE: 50 INJECTION, SOLUTION INTRAVENOUS at 02:48

## 2020-08-17 RX ADMIN — SODIUM CHLORIDE 8 MG/HR: 9 INJECTION, SOLUTION INTRAVENOUS at 12:13

## 2020-08-17 RX ADMIN — Medication 10 MEQ: at 11:15

## 2020-08-17 RX ADMIN — PANTOPRAZOLE SODIUM 40 MG: 40 INJECTION, POWDER, FOR SOLUTION INTRAVENOUS at 21:26

## 2020-08-17 RX ADMIN — Medication 10 MEQ: at 12:08

## 2020-08-17 RX ADMIN — SODIUM CHLORIDE 8 MG/HR: 9 INJECTION, SOLUTION INTRAVENOUS at 02:53

## 2020-08-17 RX ADMIN — DEXTROSE MONOHYDRATE: 50 INJECTION, SOLUTION INTRAVENOUS at 15:20

## 2020-08-17 RX ADMIN — Medication 10 MEQ: at 10:15

## 2020-08-17 RX ADMIN — MICONAZOLE NITRATE: 20 POWDER TOPICAL at 08:56

## 2020-08-17 ASSESSMENT — ACTIVITIES OF DAILY LIVING (ADL)
ADLS_ACUITY_SCORE: 14.5
ADLS_ACUITY_SCORE: 24

## 2020-08-17 NOTE — PLAN OF CARE
Cognitive Concerns/ Orientation :MARCIE orientation. Confused, restless, agitated, illogical/garbled speech  BEHAVIOR & AGGRESSION TOOL COLOR: Yellow  CIWA SCORE: 7,11, 7  ABNL VS/O2: Bradycardic at times, other VSS on RA  MOBILITY: Assist of 2 with cares,  repositions self frequently in bed.  PAIN MANAGMENT: denies pain  DIET: NPO  BOWEL/BLADDER: incontinent of urine  ABNL LAB/BG: Hgb 8.9; BG 84, 98  DRAIN/DEVICES: PIV x3  TELEMETRY RHYTHM: NSR  SKIN: scattered bruises, scabs, skin tear, redness to groin area- miconazole powder applied   TESTS/PROCEDURES: n/a  D/C DAY/GOALS/PLACE: pending  OTHER IMPORTANT INFO: Sitter at bedside. Seizure precaution in place. GI following.

## 2020-08-17 NOTE — PLAN OF CARE
Cognitive Concerns/ Orientation :MARCIE orientation. Confused, restless, agitated, illogical/garbled speech  BEHAVIOR & AGGRESSION TOOL COLOR: Yellow  CIWA SCORE: 7,11  ABNL VS/O2: Bradycardic at times, other VSS on RA  MOBILITY: Assist of 2 with cares,  repositions self frequently in bed.  PAIN MANAGMENT: denies pain  DIET: NPO ex. meds  BOWEL/BLADDER: incontinent of urine  ABNL LAB/BG: Hgb 8.9; BG 84, 98  DRAIN/DEVICES: PIV x3  TELEMETRY RHYTHM: NSR  SKIN: scattered bruises, scabs, skin tear, redness to groin area- miconazole powder applied   TESTS/PROCEDURES: n/a  D/C DAY/GOALS/PLACE: pending  OTHER IMPORTANT INFO: Sitter at bedside. Seizure precaution in place. GI following.

## 2020-08-17 NOTE — PLAN OF CARE
DATE & TIME: 08/17/2020 7-3pm  Cognitive Concerns/ Orientation :MARCIE orientation. Confused, restless, illogical/garbled speech  BEHAVIOR & AGGRESSION TOOL COLOR: Yellow  CIWA SCORE: 4,4  ABNL VS/O2: Bradycardic at times, other VSS on RA  MOBILITY: Assist of 2 with cares,  repositions self frequently in bed.  PAIN MANAGMENT: denies pain  DIET: NPO, D5 infusing @75  BOWEL/BLADDER: incontinent of urine  ABNL LAB/BG: Hgb 9.7, K+3.1 replacing (IV), -96  DRAIN/DEVICES: PIV x3  TELEMETRY RHYTHM: NSR  SKIN: scattered bruises, scabs, skin tear, redness to groin area- miconazole powder applied   TESTS/PROCEDURES: n/a  D/C DAY/GOALS/PLACE: pending  OTHER IMPORTANT INFO: Sitter at bedside. Seizure precaution in place. GI following, IV protonix gtt.

## 2020-08-17 NOTE — PROGRESS NOTES
[facility]    Hospitalist Progress Note    Date of Service (when I saw the patient): 08/17/2020    Assessment & Plan   Abhijeet Mccauley is a 59 year old male who presents with fall.    Falls  Encephalopathy, likely metabolic and alcohol withdrawal  Hospitalized 7/2020 for UTI. With increasing confusion and falls. Recent alcohol treatment 7/4-8/5, began drinking immediately upon discharge. Confused on presentation. With at least 2 falls. 4-5 beers (at least) daily.  On presentation confusion and lethargy. Lactic acid elevated at 2.6. ammonia 54. WBC 13. UA with large blood and large leuest. Etoh 0.03 on admission. CT abdomen with cirrhosis with varices, small volume ascites, R 7th and 10th rib fractures. CT head/ c-spine negative for acute fracture. he is afebrile and with no abdominal pain, making SBP unlikely.  - 8/13 blood culture NGTD 8/174  - urine culture negative  - ceftriaxone 1g IV q24 hours started 8/13, with negative urine and little evidence for SBP discontinued abx and monitor clinically  - lactate improved 1.9 8/14  - HE management as below  - mental status slowly improving, knows year, place, president,  8/17. Attempt swallow to see if we can get lactulose started    Respiratory distress 8/16  Type II NSTEMI  Early am 8/16 with respiratory distress. Audible expiratory wheezes appreciated. Otherwise lung sounds clear. O2 sats 97% on 2L. Vitals otherwise stable. CXR clear, BNP elevated 5477. Troponin detectable at 0.070. VBG 7.35/36/30  - furosemide 40 mg x 1 given  - repeat troponin in am 8/18  - recent echo 6/2020 with EF 55-60, mild concentric LV hypertrophy. Will monitor for repeat need  - suspect underlying COPD, less likely acute HFpEF. Reassuring that CXR is clear and lungs clear 8/16    Hypernatremia  Hypokalemia  Na to 152 am 8/16.   - D5W at 75 ml/ hour  - Na improved 149 8/17  - replace K per protocol    Anemia  Known history of varices with banding. Unable to  obtain history on admission.  ? Reported hemoccult positive. Hemoglobin on admission is 6.6.  INR on admission is 1.45.    - transfused x 1 unit in ED  - type and screen completed  - GI following, appreciate assistance  - pantoprazole 40 mg IV BID  - noted plans by GI for possible EGD, currently NPO  - consent is signed and in chart  - hgb stable 8/17 9.7    Hypoglycemia  Noted overnight with BS to 62, 59. Started on D5W. Hypoglycemia 2/2 NPO status with underlying liver disease and impaired gluconeogenesis.   - d50 as needed  - D5W for hypernatremia    Alcohol use disorder  H/o Etoh withdrawal seizures  Longstanding h/o Etoh use/ abuse. Multiple recent admissions for Etoh related issues. H/o Etoh w/d seizures.   - seizure precautions  - CIWA   - prn lorazepam  - IV/PO vitamins  - eventually need psych/ CD involvement, ?Commitment?    Alcoholic cirrhosis  Concern for hepatic encephalopathy  Longstanding history of alcohol use disorder with resultant cirrhosis. LFT's ok except alk phos (see below). No abdominal pain. Ammonia on admission at 54, improved 8/14 to 29. Total bili rising, 1.7 on 8/17  - lactulose BID when able  - rifaximin 550 mg BID when able  - hold on prior to admission diuretics (furosemide, spironolactone)  - continue to monitor bilirubin   - MELD-Na 16 8/17    Elevated alk phos  Seen by GI inpatient 6/23. Suspected Alk phos elevation multifactorial, related to cirrhosis, recent fractures. At that time further workup  (EUS with liver biopsy) unlikely to  with ongoing active Etoh history and lack of follow up  - monitor for now    Right 10th posterior rib fracture  Patient with CT on admission showing comminuted segmental fracture of right seventh rib with bilateral rib fractures otherwise stable, as well as right posterior rib fracture with increased displacement.  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 Lino  Baseline creatinine appears to be in the 1.4-1.9 range as of recent, 1.68 on admission. Patient with CT on admission showing ureteric stent present.  - avoid nephrotoxins  - creatinine stable 8/17 1.51    History of AML  Diagnosed 2008, s/p chemotherapy, complete remission >10 years  -Monitor    COPD  - Continue PTA inhaler Advair (Breo)  500-50 1 puff daily  - prn duonebs available    MAGALIE  Hold medications    FEN (fluids, electrolytes and nutrition): NPO, D5W  Discussed with nursing, sister.  DVT Prophylaxis: Pneumatic Compression Devices  Code Status: Full Code    Disposition: Expected discharge unclear, very ill    Adams Begum MD  [phone] (P)  Text Page    Interval History   Overnight events reviewed. Alert, answers some questions. Denies cp/sob.     -Data reviewed today: I reviewed all new labs and imaging results over the last 24 hours. I personally reviewed no images or EKG's today.    Physical Exam   Temp: 97.3  F (36.3  C) Temp src: Oral BP: 134/65 Pulse: 56 Heart Rate: 72 Resp: 18 SpO2: 96 % O2 Device: None (Room air)    Vitals:    08/14/20 0008 08/15/20 0042   Weight: 76.5 kg (168 lb 10.4 oz) 78.6 kg (173 lb 4.5 oz)     Vital Signs with Ranges  Temp:  [97.3  F (36.3  C)-98.1  F (36.7  C)] 97.3  F (36.3  C)  Pulse:  [56-83] 56  Heart Rate:  [71-72] 72  Resp:  [18-22] 18  BP: (134-145)/(65-79) 134/65  SpO2:  [96 %-100 %] 96 %  I/O last 3 completed shifts:  In: 940 [I.V.:940]  Out: -     Constitutional: Alert, oriented to place, year, president/   Respiratory: Lungs clear to auscultation bilaterally, no wheezing or crackles  Cardiovascular: Regular rate and rhythm, no murmurs. No edema  GI: Soft, non-tender, non-disteneded, good bowel sounds  Skin/Integumen: No erythema  Other: restless    Medications     D5W 75 mL/hr at 08/17/20 1520     pantoprazole (PROTONIX) infusion ADULT/PEDS GREATER than or EQUAL to 45 kg 8 mg/hr (08/17/20 1213)        fluticasone-vilanterol  1 puff Inhalation Daily     folic acid  1 mg Oral Daily     miconazole   Topical BID     multivitamin w/minerals  1 tablet Oral Daily     sodium chloride (PF)  3 mL Intracatheter Q8H     thiamine  100 mg Oral Daily       Data   Recent Labs   Lab 08/17/20  0816 08/16/20  1459 08/16/20  0130 08/15/20  0916  08/14/20  0034 08/13/20  1620   WBC 8.8  --  7.2 6.9  --  9.5 13.0*   HGB 9.7* 8.9* 8.9* 6.9*   < > 8.3* 6.6*   MCV 89  --  89 90  --  88 89     --  169 146*  --  161 182   INR  --   --   --   --   --  1.44* 1.45*   *  --  152* 152*  --  146* 141   POTASSIUM 3.1*  --  4.4 3.3*  --  3.7 3.5   CHLORIDE 119*  --  128* 125*  --  118* 112*   CO2 24  --  19* 20  --  23 19*   BUN 12  --  15 18  --  19 20   CR 1.51*  --  1.48* 1.52*  --  1.46* 1.68*   ANIONGAP 6  --  5 7  --  5 10   BEE 8.5  --  8.3* 8.2*  --  8.2* 8.4*   *  --  80 83  --  84 72   ALBUMIN 2.7*  --  2.7*  --   --  2.6* 2.5*   PROTTOTAL 6.6*  --  6.3*  --   --  6.3* 6.2*   BILITOTAL 1.7*  --  1.5*  --   --  1.3 1.1   ALKPHOS 391*  --  393*  --   --  470* 487*   ALT 25  --  30  --   --  25 26   AST 34  --  76*  --   --  26 32   LIPASE  --   --   --   --   --   --  135   TROPI  --   --  0.070*  --   --   --   --     < > = values in this interval not displayed.       No results found for this or any previous visit (from the past 24 hour(s)).

## 2020-08-17 NOTE — PROGRESS NOTES
[facility]    Hospitalist Progress Note  Interval History   Still confused and in restraint w/ 1:1. H/H has been stable since 8/15 transfusion and pt actually has over response which makes one of the low level possibly a lab error.    All other review of systems are negative.    Assessment & Plan   Abhijeet Mccauley is a 59 year old male who was admitted on 8/13/2020. GI consulted for severe anemia in the setting of Etoh cirrhosis w/ prior documentation of varcies but negative on EGD 2/2020, not sure whether pt has HE from prior documentation.     Problem: Anemia:  stable   -  Will hold EGD despite consent in place given stable since 8/15 transfusion and pt actually has over response which makes one of the low level possibly a lab error  -  C/w PPI and supportive care   -  Can resume diet as toelrated    Problem: encephalopathy:  Likely multifactorial  -  Agree with hospitalist, we can manage as Etoh withdrawal first then when withdrawal is done then possible start HE tx given not sure whether pt able to tolerate lactulose per oral or rectum    -  Regardless, if pt is oral meds, will recommend rifaximin        60880 level 2 follow up          Code Status: Full Code    -Data reviewed today: I reviewed all new labs and imaging results over the last 24 hours.     Physical Exam   Temp: 97.8  F (36.6  C) Temp src: Axillary BP: 131/87 Pulse: 56 Heart Rate: 63 Resp: 18 SpO2: 98 % O2 Device: None (Room air)    Vitals:    08/14/20 0008 08/15/20 0042   Weight: 76.5 kg (168 lb 10.4 oz) 78.6 kg (173 lb 4.5 oz)     Vital Signs with Ranges  Temp:  [97.3  F (36.3  C)-98.1  F (36.7  C)] 97.8  F (36.6  C)  Pulse:  [56-83] 56  Heart Rate:  [63-72] 63  Resp:  [18-22] 18  BP: (131-145)/(65-87) 131/87  SpO2:  [96 %-100 %] 98 %  I/O last 3 completed shifts:  In: 940 [I.V.:940]  Out: -     Constitutional: Completely confused and thrashing in bed  Respiratory: Clear to auscultation bilaterally, no crackles or  wheezing  Cardiovascular: Regular rate and rhythm, normal S1 and S2, and no murmur noted  GI: Normal bowel sounds, soft, non-distended, non-tender  Skin/Integumen: No rashes, no cyanosis, no edema  Other:     Kathy Waters MD  (Radha  [facility]  Office: [phone]  Cell: [phone], please feel free to call the cell    Medications     D5W 75 mL/hr at 08/17/20 1520       fluticasone-vilanterol  1 puff Inhalation Daily     folic acid  1 mg Oral Daily     miconazole   Topical BID     multivitamin w/minerals  1 tablet Oral Daily     pantoprazole (PROTONIX) IV  40 mg Intravenous BID     sodium chloride (PF)  3 mL Intracatheter Q8H     thiamine  100 mg Oral Daily       Data   Recent Labs   Lab 08/17/20  0816 08/16/20  1459 08/16/20  0130 08/15/20  0916  08/14/20  0034 08/13/20  1620   WBC 8.8  --  7.2 6.9  --  9.5 13.0*   HGB 9.7* 8.9* 8.9* 6.9*   < > 8.3* 6.6*   MCV 89  --  89 90  --  88 89     --  169 146*  --  161 182   INR  --   --   --   --   --  1.44* 1.45*   *  --  152* 152*  --  146* 141   POTASSIUM 3.1*  --  4.4 3.3*  --  3.7 3.5   CHLORIDE 119*  --  128* 125*  --  118* 112*   CO2 24  --  19* 20  --  23 19*   BUN 12  --  15 18  --  19 20   CR 1.51*  --  1.48* 1.52*  --  1.46* 1.68*   ANIONGAP 6  --  5 7  --  5 10   BEE 8.5  --  8.3* 8.2*  --  8.2* 8.4*   *  --  80 83  --  84 72   ALBUMIN 2.7*  --  2.7*  --   --  2.6* 2.5*   PROTTOTAL 6.6*  --  6.3*  --   --  6.3* 6.2*   BILITOTAL 1.7*  --  1.5*  --   --  1.3 1.1   ALKPHOS 391*  --  393*  --   --  470* 487*   ALT 25  --  30  --   --  25 26   AST 34  --  76*  --   --  26 32   LIPASE  --   --   --   --   --   --  135   TROPI  --   --  0.070*  --   --   --   --     < > = values in this interval not displayed.       No results found for this or any previous visit (from the past 24 hour(s)).     Kathy Waters MD  ([facility]  Office: [phone]  Cell: [phone]

## 2020-08-17 NOTE — PROVIDER NOTIFICATION
MD Notification    Notified Person: MD    Notified Person Name: Dr. Begum     Notification Date/Time:8/17/20 8651    Notification Interaction: web page    Purpose of Notification:His K+3.1, do you want to order protocol for him?    Orders Received:K+ protocol ordered    Comments:

## 2020-08-17 NOTE — TELEPHONE ENCOUNTER
Left non-detailed message for [facility], home care, to return a call to the clinic [facility].   JESU Gramajo RN

## 2020-08-17 NOTE — PROGRESS NOTES
RN [id number]: Patient remains sleepy, awakes off/on, oriented X2-3, but doesn't stay awake long. CIWA scores 4, tele NSR, K+3.1/replaced & recheck pending. Vss, appears comfortable.  Incontinent of bowel/bladder. Turning in bed by himself. BGM 93.

## 2020-08-18 ENCOUNTER — APPOINTMENT (OUTPATIENT)
Dept: SPEECH THERAPY | Facility: CLINIC | Age: 60
End: 2020-08-18
Attending: INTERNAL MEDICINE
Payer: COMMERCIAL

## 2020-08-18 ENCOUNTER — TELEPHONE (OUTPATIENT)
Dept: FAMILY MEDICINE | Facility: CLINIC | Age: 60
End: 2020-08-18

## 2020-08-18 ENCOUNTER — MEDICAL CORRESPONDENCE (OUTPATIENT)
Dept: HEALTH INFORMATION MANAGEMENT | Facility: CLINIC | Age: 60
End: 2020-08-18

## 2020-08-18 LAB
ALBUMIN SERPL-MCNC: 2.5 G/DL (ref 3.4–5)
ALP SERPL-CCNC: 374 U/L (ref 40–150)
ALT SERPL W P-5'-P-CCNC: 22 U/L (ref 0–70)
ANION GAP SERPL CALCULATED.3IONS-SCNC: 5 MMOL/L (ref 3–14)
AST SERPL W P-5'-P-CCNC: 26 U/L (ref 0–45)
BASOPHILS # BLD AUTO: 0 10E9/L (ref 0–0.2)
BASOPHILS NFR BLD AUTO: 0.3 %
BILIRUB SERPL-MCNC: 1.9 MG/DL (ref 0.2–1.3)
BLD PROD TYP BPU: NORMAL
BLD UNIT ID BPU: 0
BLOOD PRODUCT CODE: NORMAL
BPU ID: NORMAL
BUN SERPL-MCNC: 9 MG/DL (ref 7–30)
CALCIUM SERPL-MCNC: 8.6 MG/DL (ref 8.5–10.1)
CHLORIDE SERPL-SCNC: 118 MMOL/L (ref 94–109)
CO2 SERPL-SCNC: 24 MMOL/L (ref 20–32)
CREAT SERPL-MCNC: 1.43 MG/DL (ref 0.66–1.25)
DIFFERENTIAL METHOD BLD: ABNORMAL
EOSINOPHIL # BLD AUTO: 0.3 10E9/L (ref 0–0.7)
EOSINOPHIL NFR BLD AUTO: 4.3 %
ERYTHROCYTE [DISTWIDTH] IN BLOOD BY AUTOMATED COUNT: 19.8 % (ref 10–15)
GFR SERPL CREATININE-BSD FRML MDRD: 52 ML/MIN/{1.73_M2}
GLUCOSE BLDC GLUCOMTR-MCNC: 78 MG/DL (ref 70–99)
GLUCOSE BLDC GLUCOMTR-MCNC: 89 MG/DL (ref 70–99)
GLUCOSE BLDC GLUCOMTR-MCNC: 93 MG/DL (ref 70–99)
GLUCOSE BLDC GLUCOMTR-MCNC: 96 MG/DL (ref 70–99)
GLUCOSE SERPL-MCNC: 91 MG/DL (ref 70–99)
HCT VFR BLD AUTO: 31 % (ref 40–53)
HGB BLD-MCNC: 9.6 G/DL (ref 13.3–17.7)
IMM GRANULOCYTES # BLD: 0 10E9/L (ref 0–0.4)
IMM GRANULOCYTES NFR BLD: 0.1 %
INR PPP: 1.61 (ref 0.86–1.14)
LYMPHOCYTES # BLD AUTO: 1.7 10E9/L (ref 0.8–5.3)
LYMPHOCYTES NFR BLD AUTO: 23.1 %
MCH RBC QN AUTO: 27.6 PG (ref 26.5–33)
MCHC RBC AUTO-ENTMCNC: 31 G/DL (ref 31.5–36.5)
MCV RBC AUTO: 89 FL (ref 78–100)
MONOCYTES # BLD AUTO: 1.3 10E9/L (ref 0–1.3)
MONOCYTES NFR BLD AUTO: 17 %
NEUTROPHILS # BLD AUTO: 4.1 10E9/L (ref 1.6–8.3)
NEUTROPHILS NFR BLD AUTO: 55.2 %
NRBC # BLD AUTO: 0 10*3/UL
NRBC BLD AUTO-RTO: 0 /100
PLATELET # BLD AUTO: 121 10E9/L (ref 150–450)
POTASSIUM SERPL-SCNC: 3.4 MMOL/L (ref 3.4–5.3)
PROT SERPL-MCNC: 6 G/DL (ref 6.8–8.8)
RBC # BLD AUTO: 3.48 10E12/L (ref 4.4–5.9)
SODIUM SERPL-SCNC: 147 MMOL/L (ref 133–144)
TRANSFUSION STATUS PATIENT QL: NORMAL
TRANSFUSION STATUS PATIENT QL: NORMAL
TROPONIN I SERPL-MCNC: 0.03 UG/L (ref 0–0.04)
WBC # BLD AUTO: 7.5 10E9/L (ref 4–11)

## 2020-08-18 PROCEDURE — 92610 EVALUATE SWALLOWING FUNCTION: CPT | Mod: GN | Performed by: SPEECH-LANGUAGE PATHOLOGIST

## 2020-08-18 PROCEDURE — 25000132 ZZH RX MED GY IP 250 OP 250 PS 637: Performed by: PHYSICIAN ASSISTANT

## 2020-08-18 PROCEDURE — 99233 SBSQ HOSP IP/OBS HIGH 50: CPT | Performed by: INTERNAL MEDICINE

## 2020-08-18 PROCEDURE — 80053 COMPREHEN METABOLIC PANEL: CPT | Performed by: INTERNAL MEDICINE

## 2020-08-18 PROCEDURE — 12000000 ZZH R&B MED SURG/OB

## 2020-08-18 PROCEDURE — C9113 INJ PANTOPRAZOLE SODIUM, VIA: HCPCS | Performed by: INTERNAL MEDICINE

## 2020-08-18 PROCEDURE — 84484 ASSAY OF TROPONIN QUANT: CPT | Performed by: INTERNAL MEDICINE

## 2020-08-18 PROCEDURE — 25000132 ZZH RX MED GY IP 250 OP 250 PS 637: Performed by: INTERNAL MEDICINE

## 2020-08-18 PROCEDURE — 25000132 ZZH RX MED GY IP 250 OP 250 PS 637: Performed by: HOSPITALIST

## 2020-08-18 PROCEDURE — 92526 ORAL FUNCTION THERAPY: CPT | Mod: GN | Performed by: SPEECH-LANGUAGE PATHOLOGIST

## 2020-08-18 PROCEDURE — 25000128 H RX IP 250 OP 636: Performed by: INTERNAL MEDICINE

## 2020-08-18 PROCEDURE — 85025 COMPLETE CBC W/AUTO DIFF WBC: CPT | Performed by: INTERNAL MEDICINE

## 2020-08-18 PROCEDURE — 25800030 ZZH RX IP 258 OP 636: Performed by: INTERNAL MEDICINE

## 2020-08-18 PROCEDURE — 00000146 ZZHCL STATISTIC GLUCOSE BY METER IP

## 2020-08-18 PROCEDURE — 85610 PROTHROMBIN TIME: CPT | Performed by: INTERNAL MEDICINE

## 2020-08-18 PROCEDURE — 36415 COLL VENOUS BLD VENIPUNCTURE: CPT | Performed by: INTERNAL MEDICINE

## 2020-08-18 RX ORDER — LACTULOSE 10 G/15ML
20 SOLUTION ORAL 2 TIMES DAILY
Status: DISCONTINUED | OUTPATIENT
Start: 2020-08-18 | End: 2020-08-29 | Stop reason: HOSPADM

## 2020-08-18 RX ADMIN — PANTOPRAZOLE SODIUM 40 MG: 40 INJECTION, POWDER, FOR SOLUTION INTRAVENOUS at 10:02

## 2020-08-18 RX ADMIN — MULTIPLE VITAMINS W/ MINERALS TAB 1 TABLET: TAB at 11:55

## 2020-08-18 RX ADMIN — PANTOPRAZOLE SODIUM 40 MG: 40 INJECTION, POWDER, FOR SOLUTION INTRAVENOUS at 21:58

## 2020-08-18 RX ADMIN — MICONAZOLE NITRATE: 20 POWDER TOPICAL at 10:12

## 2020-08-18 RX ADMIN — FOLIC ACID 1 MG: 1 TABLET ORAL at 11:55

## 2020-08-18 RX ADMIN — MICONAZOLE NITRATE: 20 POWDER TOPICAL at 22:03

## 2020-08-18 RX ADMIN — DEXTROSE MONOHYDRATE: 50 INJECTION, SOLUTION INTRAVENOUS at 04:45

## 2020-08-18 RX ADMIN — LACTULOSE 20 G: 10 SOLUTION ORAL at 20:51

## 2020-08-18 RX ADMIN — DEXTROSE MONOHYDRATE: 50 INJECTION, SOLUTION INTRAVENOUS at 18:12

## 2020-08-18 RX ADMIN — RIFAXIMIN 550 MG: 550 TABLET ORAL at 20:51

## 2020-08-18 RX ADMIN — RIFAXIMIN 550 MG: 550 TABLET ORAL at 11:56

## 2020-08-18 ASSESSMENT — ACTIVITIES OF DAILY LIVING (ADL)
ADLS_ACUITY_SCORE: 24
ADLS_ACUITY_SCORE: 24
ADLS_ACUITY_SCORE: 14.5
ADLS_ACUITY_SCORE: 14.5
ADLS_ACUITY_SCORE: 24
ADLS_ACUITY_SCORE: 14.5

## 2020-08-18 NOTE — PLAN OF CARE
Discharge Planner SLP   Patient plan for discharge: Did not discuss  Current status: A bedside swallow evaluation was completed this am.  Pt presents with mild-moderate oral-pharyngeal dysphagia at bedside due to fluctuating alertness and impaired cognitive status.  Pt does have a hx of dysphagia s/p intubation during hospital stay at [facility] in 6/2020.  Diet was advanced to mechanical dental soft and thin liquids during that hospital stay.  Pt was a poor historian during today's evaluation.  Pt demonstrated decreased attention and awareness, bolus holding, delayed swallows, impulsivity, cough x 1 after thin liquids, and oral residue and cough x 1 with semi-solids.  Pt tolerated puree and nectar thick by spoon with mod-max cues/assist.    Recommend a dysphagia diet level 1 and nectar thick liquids with 1:1 assist/supervision and cues to use the following strategies/precautions: eat only when alert, sit at 90 degrees, liquids by spoon, verify/cue swallows, slow rate, crush meds and give with puree, hold po if increased aspiration signs and/or unable to verify swallows.    Plan to continue Tx to assess po tolerance, train strategies, and trial upgrades as indicated.      Barriers to return to prior living situation: Level of assist, confusion  Recommendations for discharge: TCU  Rationale for recommendations: Continued SLP swallow Tx at TCU to maximize safety for a least restrictive diet; SLP cognitive-linguistic Tx to maximize function for daily needs       Entered by: Arlene Stout 08/18/2020 11:48 AM

## 2020-08-18 NOTE — PLAN OF CARE
DATE & TIME: 8/17 1900-0730   Cognitive Concerns/ Orientation : Alert to self   BEHAVIOR & AGGRESSION TOOL COLOR: yellow  CIWA SCORE: 4, 7, 4  ABNL VS/O2: VSS  MOBILITY: Assist of 1, GB walker; did not get out of bed  PAIN MANAGMENT: denied pain  DIET: NPO; failed bedside swallow  BOWEL/BLADDER: incontinent  ABNL LAB/BG: BGs 93, 78  DRAIN/DEVICES: 2PIVs, 1 infusing IVF  TELEMETRY RHYTHM: NSR   SKIN: bruised, scabbed  TESTS/PROCEDURES: NA  D/C DAY/GOALS/PLACE: pending progress  OTHER IMPORTANT INFO: patient spitting up large chunks of phlegm, airway patent and uncompromised. Failed bedside swallow, more awake, left sticky note for rounding MD to consider a speech joshua/swallow study

## 2020-08-18 NOTE — TELEPHONE ENCOUNTER
Left this detailed message on [employer]'s voicemail.  The voicemail prompt identified patient as the  of this message.    Zulema Gramajo RN

## 2020-08-18 NOTE — PROGRESS NOTES
08/18/20 1158   General Information   Onset Date 08/13/20   Start of Care Date 08/18/20   Referring Physician Dr. Begum   Patient Profile Review/OT: Additional Occupational Profile Info See Profile for full history and prior level of function   Patient/Family Goals Statement Agreed to POC goal.  No additional goal was stated.   Swallowing Evaluation Bedside swallow evaluation   Behaviorial Observations   (Flucutating alertness, drowsy, but was awake for eval with cues)   Mode of current nutrition NPO   Respiratory Status Room air   Comments Per MD note: Recurrent falls, in the setting of alcohol dependence, ascites, acute metabolic encephalopathy, suspected spontaneous bacterial peritonitis versus urinary tract infection: Patient with history of alcohol dependence and alcoholic cirrhosis.  Patient initially presenting to the emergency department for falls.  Patient recently having more confusion and frequent falls.  Patient did complete recently a month at a drug and alcohol treatment facility from July 4 to August 5, 2020, however, unfortunately began drinking immediately upon return home for the treatment facility, per report.  Patient is a poor historian and is confused and somnolent on presentation.  Patient reportedly drinking 4-5 beers at least per day.  Patient reportedly fell at least twice on the day of admission and evaluation.  History of present illness and review of systems is limited due to patient with confusion and lethargy.  Lactic acid is elevated 2.6 on admission with an ammonia level 54.  White blood cell count is elevated at 13.0.  Urinalysis shows large blood and large leukocyte Estrace.  Ethanol level is 0.03 on admission.  CT of the abdomen shows cirrhosis with varices including the gastroesophageal junction, small volume ascites, increased displacement of the right 10th posterior rib fracture, comminuted segmental fracture of the right seventh rib with bilateral rib fractures  otherwise stable.  CT of the head and cervical spine are negative for acute fracture.      SLP records found from [facility] in 6/2020.  Pt was seen by SLP s/p intubation with a full liquid honey thick diet recommended initially with TF; diet was advanced to mechanical dental soft and thin liquids during that hospital stay.  Pt was a poor historian during today's evaluation.    Notes state pt had difficulty swallowing per nursing report  8/17-8/18.   Clinical Swallow Evaluation   Oral Musculature generally intact  (pt did not follow all commands for exam)   Dentition   (missing teeth)   Laryngeal Function Cough;Throat clear;Swallow;Voicing initiated;Dry swallow palpated   Clinical Swallow Eval: Thin Liquid Texture Trial   Mode of Presentation, Thin Liquids spoon   Volume of Liquid or Food Presented sips x 3   Oral Phase of Swallow Premature pharyngeal entry   Pharyngeal Phase of Swallow repeated swallows  (delay)   Diagnostic Statement impulsive large sips, slight cough x 1   Clinical Swallow Eval: Nectar Thick Liquid Texture Trial   Mode of Presentation, Nectar spoon;cup   Volume of Nectar Presented tsps x 5, sips by cup x 2   Oral Phase, Nectar Premature pharyngeal entry   Pharyngeal Phase, Nectar   (delay)   Diagnostic Statement holding, no overt signs of aspiration, cues given to swallow at times due  to decreased awareness/attention   Clinical Swallow Eval: Puree Solid Texture Trial   Mode of Presentation, Puree spoon   Volume of Puree Presented tsps x 3   Oral Phase, Puree Premature pharyngeal entry   Pharyngeal Phase, Puree   (delay)   Diagnostic Statement holding, no overt signs of aspiration, cues given to swallow at times due  to decreased awareness/attention   Clinical Swallow Eval: Semisolid Texture Trial   Mode of Presentation, Semisolid spoon   Volume of Semisolid Food Presented tsps x 2   Oral Phase, Semisolid Residue in oral cavity;Premature pharyngeal entry   Pharyngeal Phase, Semisolid   (delay)    Diagnostic Statement holding, cough x 1, cues given to swallow at times due  to decreased awareness/attention   Swallow Compensations   Swallow Compensations Pacing;Reduce amounts;Effortful swallow   Esophageal Phase of Swallow   Patient reports or presents with symptoms of esophageal dysphagia Yes   Esophageal comments Hx of GERD   Swallow Eval: Clinical Impressions   Skilled Criteria for Therapy Intervention Skilled criteria met.  Treatment indicated.   Functional Assessment Scale (FAS) 4   Treatment Diagnosis mild-modeate oral-pharyngeal dysphagia   Diet texture recommendations Dysphagia diet level 1;Nectar thick liquids   Recommended Feeding/Eating Techniques   (see below)   Therapy Frequency 5x/week   Predicted Duration of Therapy Intervention (days/wks) 1 weel   Anticipated Discharge Disposition inpatient rehabilitation facility   Risks and Benefits of Treatment have been explained. Yes   Patient, family and/or staff in agreement with Plan of Care Yes   Clinical Impression Comments  A bedside swallow evaluation was completed this am.  Pt presents with mild-moderate oral-pharyngeal dysphagia at bedside due to fluctuating alertness and impaired cognitive status.  Pt does have a hx of dysphagia s/p intubation during hospital stay at [facility] in 6/2020.  Diet was advanced to mechanical dental soft and thin liquids during that hospital stay.  Pt was a poor historian during today's evaluation.  Pt demonstrated decreased attention and awareness, bolus holding, delayed swallows, impulsivity, cough x 1 after thin liquids, and oral residue and cough x 1 with semi-solids.  Pt tolerated puree and nectar thick by spoon with mod-max cues/assist.  Recommend a dysphagia diet level 1 and nectar thick liquids with 1:1 assist/supervision and cues to use the following strategies/precautions: eat only when alert, sit at 90 degrees, liquids by spoon, verify/cue swallows, slow rate, crush meds and give with puree, hold po if increased  aspiration signs and/or unable to verify swallows.  Plan to continue Tx to assess po tolerance, train strategies, and trial upgrades as indicated.   Total Evaluation Time   Total Evaluation Time (Minutes) 10

## 2020-08-18 NOTE — PROGRESS NOTES
[facility]  Gastroenterology Progress Note     Abhijeet Mccauley MRN# [id number]   YOB: 1960 Age: 59 year old          Assessment and Plan:   Interval History: Patient remains confused, obtunded and in restraints. Hemoglobin remains stable.    Anemia  Encephalopathy  Abhijeet Mccauley is a 59 year old male who was admitted on 8/13/2020. GI consulted for severe anemia in the setting of Etoh cirrhosis w/ prior documentation of varices.  Negative on EGD 2/2020, not sure whether pt has HE from prior documentation.   - Hemoglobin stable continue to monitor daily  - C/w PPI  - Diet as tolerated  - Continue with rifaximin  - lactulose when able           Interval History:   obtunded              Review of Systems:   C: NEGATIVE for fever, chills, change in weight  E/M: NEGATIVE for ear, mouth and throat problems  R: NEGATIVE for significant cough or SOB  CV: NEGATIVE for chest pain, palpitations or peripheral edema             Medications:   I have reviewed this patient's current medications    fluticasone-vilanterol  1 puff Inhalation Daily     folic acid  1 mg Oral Daily     miconazole   Topical BID     multivitamin w/minerals  1 tablet Oral Daily     pantoprazole (PROTONIX) IV  40 mg Intravenous BID     sodium chloride (PF)  3 mL Intracatheter Q8H                  Physical Exam:   Vitals were reviewed  Vital Signs with Ranges  Temp:  [97.8  F (36.6  C)-98.6  F (37  C)] 98.1  F (36.7  C)  Pulse:  [91] 91  Heart Rate:  [63-76] 67  Resp:  [18-20] 18  BP: (129-137)/(64-87) 133/75  SpO2:  [95 %-100 %] 95 %  I/O last 3 completed shifts:  In: 944 [I.V.:944]  Out: -   Constitutional: obtunded, comfortable   Cardiovascular: negative, PMI normal. No lifts, heaves, or thrills. RRR. No murmurs, clicks gallops or rub  Respiratory: negative, Percussion normal. Good diaphragmatic excursion. Lungs clear  Head: Normocephalic. No masses, lesions, tenderness or abnormalities  Neck: Neck supple. No  adenopathy. Thyroid symmetric, normal size,, Carotids without bruits.  Abdomen: Abdomen soft, non-tender. BS normal. No masses, organomegaly           Data:   I reviewed the patient's new clinical lab test results.   Recent Labs   Lab Test 08/18/20  0842 08/17/20  0816 08/16/20  1459 08/16/20  0130  08/14/20  0034 08/13/20  1620   WBC 7.5 8.8  --  7.2   < > 9.5 13.0*   HGB 9.6* 9.7* 8.9* 8.9*   < > 8.3* 6.6*   MCV 89 89  --  89   < > 88 89   * 151  --  169   < > 161 182   INR 1.61*  --   --   --   --  1.44* 1.45*    < > = values in this interval not displayed.     Recent Labs   Lab Test 08/18/20  0842 08/17/20  2226 08/17/20  0816 08/16/20  0130   POTASSIUM 3.4 3.5 3.1* 4.4   CHLORIDE 118*  --  119* 128*   CO2 24  --  24 19*   BUN 9  --  12 15   ANIONGAP 5  --  6 5     Recent Labs   Lab Test 08/18/20  0842 08/17/20  0816 08/16/20  0130  08/13/20  1620  07/17/20  1639  07/11/20  1458  06/12/20  1005  03/05/19  0934   ALBUMIN 2.5* 2.7* 2.7*   < > 2.5*   < >  --    < >  --    < > 2.9*   < > 3.5   BILITOTAL 1.9* 1.7* 1.5*   < > 1.1   < >  --    < >  --    < > 1.9*   < > 2.2*   ALT 22 25 30   < > 26   < >  --    < >  --    < > 19   < > 18   AST 26 34 76*   < > 32   < >  --    < >  --    < > 38   < > 38   PROTEIN  --   --   --   --  Negative  --  Negative  --  Negative   < >  --    < >  --    LIPASE  --   --   --   --  135  --   --   --   --   --  192  --  314    < > = values in this interval not displayed.       I reviewed the patient's new imaging results.    All laboratory data reviewed  All imaging studies reviewed by me.    Jojo Romano PA-C,  8/18/2020  [facility]  Office : [phone]  Cell: [phone] (Dr. Fish)  Cell: [phone] (Jojo Romano PA-C)

## 2020-08-18 NOTE — PLAN OF CARE
DATE & TIME: 8/18 2346-7603   Cognitive Concerns/ Orientation: Alert to self when awake, lethargic, arouses to voice/touch this morning. More awake/alert as day has progressed. Knows month, year.   BEHAVIOR & AGGRESSION TOOL COLOR: green  CIWA SCORE: 4, 4, 7  ABNL VS/O2: VSS, room air  MOBILITY: Assist of 2, GB walker  PAIN MANAGMENT: denies pain  DIET: DD1, nectar by spoon. Meds crushed in applesauce. SLP following  BOWEL/BLADDER: incontinent B & B. Loose BM x1 this afternoon  ABNL LAB/BG: BGs 78, 89,   DRAIN/DEVICES: PIV infusing  TELEMETRY RHYTHM: NSR   SKIN: bruised, scabbed, Mepilex RUE covering skin tear  TESTS/PROCEDURES: NA  D/C DAY/GOALS/PLACE: pending progress  OTHER IMPORTANT INFO: GI following. TCU recommended at discharge. Sitter at bedside. Rifaximin and lactulose started today. Visually hallucinating this afternoon-pt reports seeing a frog in the air, Dr. Begum aware.

## 2020-08-18 NOTE — PROGRESS NOTES
[facility]    Hospitalist Progress Note    Date of Service (when I saw the patient): 08/18/2020    Assessment & Plan   Abhijeet Mccauley is a 59 year old male who presents with fall.    Falls  Encephalopathy, likely metabolic and alcohol withdrawal  Hospitalized 7/2020 for UTI. With increasing confusion and falls. Recent alcohol treatment 7/4-8/5, began drinking immediately upon discharge. Confused on presentation. With at least 2 falls. 4-5 beers (at least) daily.  On presentation confusion and lethargy. Lactic acid elevated at 2.6. ammonia 54. WBC 13. UA with large blood and large leuest. Etoh 0.03 on admission. CT abdomen with cirrhosis with varices, small volume ascites, R 7th and 10th rib fractures. CT head/ c-spine negative for acute fracture. he is afebrile and with no abdominal pain, making SBP unlikely.  - 8/13 blood cultures no growth  - urine culture negative  - ceftriaxone 1g IV q24 hours started 8/13, with negative urine and little evidence for SBP discontinued abx and monitor clinically  - lactate improved 1.9 8/14  - HE management as below  - encephalopathy improved 8/18. Still lethargic but appropriate. Per nursing ate 75% of lunch. Lactulose started.     Alcoholic cirrhosis  Concern for hepatic encephalopathy  Longstanding history of alcohol use disorder with resultant cirrhosis. LFT's ok except alk phos (see below). No abdominal pain. Ammonia on admission at 54, improved 8/14 to 29. Total bili rising, 1.7 on 8/17  - lactulose 20 gm BID   - rifaximin 550 mg BID   - hold on prior to admission diuretics (furosemide, spironolactone)  - continue to monitor bilirubin, slowly rising 8/18  - SLP advanced diet 8/18, will try to get lactulose/ rifaximin started    Anemia  Known history of varices with banding. Unable to obtain history on admission.  ? Reported hemoccult positive. Hemoglobin on admission is 6.6.  INR on admission is 1.45.    - transfused x 1 unit in ED  - type and screen  completed  - GI following, appreciate assistance  - pantoprazole 40 mg IV BID  - noted plans by GI for possible EGD, currently NPO  - consent is signed and in chart  - hgb stable 8/18 9.6    Alcohol use disorder  H/o Etoh withdrawal seizures  Longstanding h/o Etoh use/ abuse. Multiple recent admissions for Etoh related issues. H/o Etoh w/d seizures.   - seizure precautions  - CIWA   - prn lorazepam  - IV/PO vitamins  - eventually need psych/ CD involvement, ?Commitment?    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 Lino  Baseline creatinine appears to be in the 1.4-1.9 range as of recent, 1.68 on admission. Patient with CT on admission showing ureteric stent present.  - avoid nephrotoxins  - creatinine stable 8/18 1.43    Hypernatremia  Hypokalemia  Na to 152 am 8/16.   - D5W at 75 ml/ hour  - Na improved 147 8/18  - replace K per protocol    Respiratory distress 8/16  Type II NSTEMI  Early am 8/16 with respiratory distress. Audible expiratory wheezes appreciated. Otherwise lung sounds clear. O2 sats 97% on 2L. Vitals otherwise stable. CXR clear, BNP elevated 5477. Troponin detectable at 0.070. VBG 7.35/36/30  - furosemide 40 mg x 1 given  - repeat troponin am 8/18 0.027  - recent echo 6/2020 with EF 55-60, mild concentric LV hypertrophy. Will monitor for repeat need  - suspect underlying COPD, less likely acute HFpEF. Reassuring that CXR is clear and lungs clear 8/16    Hypoglycemia  Noted overnight 8/15 with BS to 62, 59. Started on D5W. Hypoglycemia 2/2 NPO status with underlying liver disease and impaired gluconeogenesis.   - d50 as needed  - D5W for hypernatremia    Elevated alk phos  Seen by GI inpatient 6/23. Suspected Alk phos elevation multifactorial, related to cirrhosis, recent fractures. At that time further workup  (EUS with liver biopsy) unlikely to  with ongoing active Etoh history and lack of follow up  - monitor  for now    Right 10th posterior rib fracture  Patient with CT on admission showing comminuted segmental fracture of right seventh rib with bilateral rib fractures otherwise stable, as well as right posterior rib fracture with increased displacement.  -Monitor  -prn acetaminophen    History of AML  Diagnosed 2008, s/p chemotherapy, complete remission >10 years  -Monitor    COPD  - Continue PTA inhaler Advair (Breo)  500-50 1 puff daily as able  - prn duonebs available    MAGALIE  Hold medications    FEN (fluids, electrolytes and nutrition): DDL1 with nectar thick liquids  Discussed with nursing.  DVT Prophylaxis: Pneumatic Compression Devices  Code Status: Full Code    Disposition: Expected discharge 3-5 days pending improvement in encephalopathy    Adams Begum MD  [phone] (P)  Text Page    Interval History   Overnight events reviewed. Sleepy but arousable today. Appropriately answers questions. Denies cp/sob.     -Data reviewed today: I reviewed all new labs and imaging results over the last 24 hours. I personally reviewed no images or EKG's today.    Physical Exam   Temp: 98.6  F (37  C) Temp src: Oral BP: 125/68 Pulse: 91 Heart Rate: 89 Resp: 18 SpO2: 100 % O2 Device: None (Room air)    Vitals:    08/14/20 0008 08/15/20 0042   Weight: 76.5 kg (168 lb 10.4 oz) 78.6 kg (173 lb 4.5 oz)     Vital Signs with Ranges  Temp:  [98.1  F (36.7  C)-98.6  F (37  C)] 98.6  F (37  C)  Pulse:  [91] 91  Heart Rate:  [67-89] 89  Resp:  [18-20] 18  BP: (125-137)/(64-75) 125/68  SpO2:  [95 %-100 %] 100 %  I/O last 3 completed shifts:  In: 1399 [P.O.:80; I.V.:1319]  Out: -     Constitutional:  no distress, somnolent  Respiratory: Lungs clear to auscultation bilaterally, no wheezing or crackles  Cardiovascular: Regular rate and rhythm, no murmurs. No edema  GI: Soft, non-tender, non-disteneded, good bowel sounds  Skin/Integumen: No erythema  Other:     Medications     D5W 75 mL/hr at 08/18/20 0445       fluticasone-vilanterol   1 puff Inhalation Daily     folic acid  1 mg Oral Daily     miconazole   Topical BID     multivitamin w/minerals  1 tablet Oral Daily     pantoprazole (PROTONIX) IV  40 mg Intravenous BID     rifaximin  550 mg Oral BID     sodium chloride (PF)  3 mL Intracatheter Q8H       Data   Recent Labs   Lab 08/18/20  0842 08/17/20  2226 08/17/20  0816 08/16/20  1459 08/16/20  0130  08/14/20  0034 08/13/20  1620   WBC 7.5  --  8.8  --  7.2   < > 9.5 13.0*   HGB 9.6*  --  9.7* 8.9* 8.9*   < > 8.3* 6.6*   MCV 89  --  89  --  89   < > 88 89   *  --  151  --  169   < > 161 182   INR 1.61*  --   --   --   --   --  1.44* 1.45*   *  --  149*  --  152*   < > 146* 141   POTASSIUM 3.4 3.5 3.1*  --  4.4   < > 3.7 3.5   CHLORIDE 118*  --  119*  --  128*   < > 118* 112*   CO2 24  --  24  --  19*   < > 23 19*   BUN 9  --  12  --  15   < > 19 20   CR 1.43*  --  1.51*  --  1.48*   < > 1.46* 1.68*   ANIONGAP 5  --  6  --  5   < > 5 10   BEE 8.6  --  8.5  --  8.3*   < > 8.2* 8.4*   GLC 91 93 101*  --  80   < > 84 72   ALBUMIN 2.5*  --  2.7*  --  2.7*  --  2.6* 2.5*   PROTTOTAL 6.0*  --  6.6*  --  6.3*  --  6.3* 6.2*   BILITOTAL 1.9*  --  1.7*  --  1.5*  --  1.3 1.1   ALKPHOS 374*  --  391*  --  393*  --  470* 487*   ALT 22  --  25  --  30  --  25 26   AST 26  --  34  --  76*  --  26 32   LIPASE  --   --   --   --   --   --   --  135   TROPI 0.027  --   --   --  0.070*  --   --   --     < > = values in this interval not displayed.       No results found for this or any previous visit (from the past 24 hour(s)).

## 2020-08-19 ENCOUNTER — APPOINTMENT (OUTPATIENT)
Dept: SPEECH THERAPY | Facility: CLINIC | Age: 60
End: 2020-08-19
Attending: INTERNAL MEDICINE
Payer: COMMERCIAL

## 2020-08-19 ENCOUNTER — TELEPHONE (OUTPATIENT)
Dept: FAMILY MEDICINE | Facility: CLINIC | Age: 60
End: 2020-08-19

## 2020-08-19 ENCOUNTER — MEDICAL CORRESPONDENCE (OUTPATIENT)
Dept: HEALTH INFORMATION MANAGEMENT | Facility: CLINIC | Age: 60
End: 2020-08-19

## 2020-08-19 LAB
ALBUMIN SERPL-MCNC: 2.7 G/DL (ref 3.4–5)
ALP SERPL-CCNC: 407 U/L (ref 40–150)
ALT SERPL W P-5'-P-CCNC: 21 U/L (ref 0–70)
ANION GAP SERPL CALCULATED.3IONS-SCNC: 6 MMOL/L (ref 3–14)
AST SERPL W P-5'-P-CCNC: 23 U/L (ref 0–45)
BACTERIA SPEC CULT: NO GROWTH
BACTERIA SPEC CULT: NO GROWTH
BASOPHILS # BLD AUTO: 0 10E9/L (ref 0–0.2)
BASOPHILS NFR BLD AUTO: 0.2 %
BILIRUB SERPL-MCNC: 2.1 MG/DL (ref 0.2–1.3)
BUN SERPL-MCNC: 10 MG/DL (ref 7–30)
CALCIUM SERPL-MCNC: 8.6 MG/DL (ref 8.5–10.1)
CHLORIDE SERPL-SCNC: 113 MMOL/L (ref 94–109)
CO2 SERPL-SCNC: 22 MMOL/L (ref 20–32)
CREAT SERPL-MCNC: 1.46 MG/DL (ref 0.66–1.25)
DIFFERENTIAL METHOD BLD: ABNORMAL
EOSINOPHIL # BLD AUTO: 0.4 10E9/L (ref 0–0.7)
EOSINOPHIL NFR BLD AUTO: 4.5 %
ERYTHROCYTE [DISTWIDTH] IN BLOOD BY AUTOMATED COUNT: 20.2 % (ref 10–15)
GFR SERPL CREATININE-BSD FRML MDRD: 51 ML/MIN/{1.73_M2}
GLUCOSE BLDC GLUCOMTR-MCNC: 84 MG/DL (ref 70–99)
GLUCOSE BLDC GLUCOMTR-MCNC: 92 MG/DL (ref 70–99)
GLUCOSE BLDC GLUCOMTR-MCNC: 94 MG/DL (ref 70–99)
GLUCOSE BLDC GLUCOMTR-MCNC: 98 MG/DL (ref 70–99)
GLUCOSE BLDC GLUCOMTR-MCNC: 98 MG/DL (ref 70–99)
GLUCOSE SERPL-MCNC: 106 MG/DL (ref 70–99)
HCT VFR BLD AUTO: 32.7 % (ref 40–53)
HGB BLD-MCNC: 10 G/DL (ref 13.3–17.7)
IMM GRANULOCYTES # BLD: 0 10E9/L (ref 0–0.4)
IMM GRANULOCYTES NFR BLD: 0.2 %
LYMPHOCYTES # BLD AUTO: 2.2 10E9/L (ref 0.8–5.3)
LYMPHOCYTES NFR BLD AUTO: 26.5 %
MCH RBC QN AUTO: 27 PG (ref 26.5–33)
MCHC RBC AUTO-ENTMCNC: 30.6 G/DL (ref 31.5–36.5)
MCV RBC AUTO: 88 FL (ref 78–100)
MONOCYTES # BLD AUTO: 1.3 10E9/L (ref 0–1.3)
MONOCYTES NFR BLD AUTO: 15.6 %
NEUTROPHILS # BLD AUTO: 4.5 10E9/L (ref 1.6–8.3)
NEUTROPHILS NFR BLD AUTO: 53 %
NRBC # BLD AUTO: 0 10*3/UL
NRBC BLD AUTO-RTO: 0 /100
PLATELET # BLD AUTO: 140 10E9/L (ref 150–450)
POTASSIUM SERPL-SCNC: 3.3 MMOL/L (ref 3.4–5.3)
POTASSIUM SERPL-SCNC: 4 MMOL/L (ref 3.4–5.3)
PROT SERPL-MCNC: 6.4 G/DL (ref 6.8–8.8)
RBC # BLD AUTO: 3.71 10E12/L (ref 4.4–5.9)
SODIUM SERPL-SCNC: 141 MMOL/L (ref 133–144)
SPECIMEN SOURCE: NORMAL
SPECIMEN SOURCE: NORMAL
WBC # BLD AUTO: 8.4 10E9/L (ref 4–11)

## 2020-08-19 PROCEDURE — 25000128 H RX IP 250 OP 636: Performed by: INTERNAL MEDICINE

## 2020-08-19 PROCEDURE — 85025 COMPLETE CBC W/AUTO DIFF WBC: CPT | Performed by: INTERNAL MEDICINE

## 2020-08-19 PROCEDURE — 25000132 ZZH RX MED GY IP 250 OP 250 PS 637: Performed by: INTERNAL MEDICINE

## 2020-08-19 PROCEDURE — 25800030 ZZH RX IP 258 OP 636: Performed by: INTERNAL MEDICINE

## 2020-08-19 PROCEDURE — 12000000 ZZH R&B MED SURG/OB

## 2020-08-19 PROCEDURE — 36415 COLL VENOUS BLD VENIPUNCTURE: CPT | Performed by: INTERNAL MEDICINE

## 2020-08-19 PROCEDURE — C9113 INJ PANTOPRAZOLE SODIUM, VIA: HCPCS | Performed by: INTERNAL MEDICINE

## 2020-08-19 PROCEDURE — 99233 SBSQ HOSP IP/OBS HIGH 50: CPT | Performed by: INTERNAL MEDICINE

## 2020-08-19 PROCEDURE — 25000132 ZZH RX MED GY IP 250 OP 250 PS 637: Performed by: NURSE PRACTITIONER

## 2020-08-19 PROCEDURE — 25000132 ZZH RX MED GY IP 250 OP 250 PS 637: Performed by: PHYSICIAN ASSISTANT

## 2020-08-19 PROCEDURE — 25000132 ZZH RX MED GY IP 250 OP 250 PS 637: Performed by: HOSPITALIST

## 2020-08-19 PROCEDURE — 92526 ORAL FUNCTION THERAPY: CPT | Mod: GN | Performed by: SPEECH-LANGUAGE PATHOLOGIST

## 2020-08-19 PROCEDURE — 84132 ASSAY OF SERUM POTASSIUM: CPT | Performed by: INTERNAL MEDICINE

## 2020-08-19 PROCEDURE — 00000146 ZZHCL STATISTIC GLUCOSE BY METER IP

## 2020-08-19 PROCEDURE — 80053 COMPREHEN METABOLIC PANEL: CPT | Performed by: INTERNAL MEDICINE

## 2020-08-19 RX ORDER — TRAZODONE HYDROCHLORIDE 50 MG/1
50 TABLET, FILM COATED ORAL AT BEDTIME
Status: DISCONTINUED | OUTPATIENT
Start: 2020-08-19 | End: 2020-08-21

## 2020-08-19 RX ORDER — HALOPERIDOL 5 MG/ML
1-4 INJECTION INTRAMUSCULAR EVERY 6 HOURS PRN
Status: DISCONTINUED | OUTPATIENT
Start: 2020-08-19 | End: 2020-08-29 | Stop reason: HOSPADM

## 2020-08-19 RX ADMIN — TRAZODONE HYDROCHLORIDE 50 MG: 50 TABLET ORAL at 21:13

## 2020-08-19 RX ADMIN — Medication 10 MEQ: at 16:15

## 2020-08-19 RX ADMIN — DEXTROSE MONOHYDRATE: 50 INJECTION, SOLUTION INTRAVENOUS at 08:17

## 2020-08-19 RX ADMIN — RIFAXIMIN 550 MG: 550 TABLET ORAL at 08:28

## 2020-08-19 RX ADMIN — FLUTICASONE FUROATE AND VILANTEROL TRIFENATATE 1 PUFF: 200; 25 POWDER RESPIRATORY (INHALATION) at 08:29

## 2020-08-19 RX ADMIN — MULTIPLE VITAMINS W/ MINERALS TAB 1 TABLET: TAB at 08:28

## 2020-08-19 RX ADMIN — HALOPERIDOL LACTATE 2 MG: 5 INJECTION, SOLUTION INTRAMUSCULAR at 18:38

## 2020-08-19 RX ADMIN — MICONAZOLE NITRATE: 20 POWDER TOPICAL at 08:37

## 2020-08-19 RX ADMIN — PANTOPRAZOLE SODIUM 40 MG: 40 INJECTION, POWDER, FOR SOLUTION INTRAVENOUS at 08:27

## 2020-08-19 RX ADMIN — HALOPERIDOL LACTATE 2 MG: 5 INJECTION, SOLUTION INTRAMUSCULAR at 19:33

## 2020-08-19 RX ADMIN — FOLIC ACID 1 MG: 1 TABLET ORAL at 08:28

## 2020-08-19 RX ADMIN — Medication 10 MEQ: at 14:11

## 2020-08-19 RX ADMIN — Medication 10 MEQ: at 13:10

## 2020-08-19 RX ADMIN — Medication 10 MEQ: at 15:12

## 2020-08-19 RX ADMIN — RIFAXIMIN 550 MG: 550 TABLET ORAL at 21:13

## 2020-08-19 RX ADMIN — PANTOPRAZOLE SODIUM 40 MG: 40 INJECTION, POWDER, FOR SOLUTION INTRAVENOUS at 21:24

## 2020-08-19 RX ADMIN — LACTULOSE 20 G: 10 SOLUTION ORAL at 21:19

## 2020-08-19 ASSESSMENT — ACTIVITIES OF DAILY LIVING (ADL)
ADLS_ACUITY_SCORE: 23
ADLS_ACUITY_SCORE: 23
ADLS_ACUITY_SCORE: 24
ADLS_ACUITY_SCORE: 24
ADLS_ACUITY_SCORE: 23
ADLS_ACUITY_SCORE: 24

## 2020-08-19 NOTE — PLAN OF CARE
Discharge Planner SLP   Patient plan for discharge: Not able to state.   Current status:  Patient seen this am for swallow treatment with nurse present. He was up in the chair and needed alot of encouragement to have something to eat and drink. He was able to tolerate small sips of nectar thick liquids with mild delay, but no immediate or delayed Sx of aspiration. He had a bite reflex on the spoon with pudding trials with mildly decreased AP movement of the bolus, but had good bolus extraction and clearing. Tolerating current diet on limited trials accepted.   Recommend: 1. Continue on the DDL 2 with nectar thick liquids. 2. Upright, assistance/supervision, in a chair for meals, no straws, small bites and sips.  Barriers to return to prior living situation: Dysphagia and cognition.  Recommendations for discharge: TCU  Rationale for recommendations: Patient will need on going ST needs for dysphagia management. Patient is below his baseline.        Entered by: Ying De La Torre 08/19/2020 1:17 PM

## 2020-08-19 NOTE — PROGRESS NOTES
New orders for PO seroquel, 12.5 mg.  Patient refusing PO intake/medications, remains agitated, restless.  Reports of previous evening similarities with behavior/orientation.    Paged on call for IM medications.    Update: 1830  PRN medications added.    Bedtime medication orders added.    See MAR.

## 2020-08-19 NOTE — PLAN OF CARE
Cognitive Concerns/ Orientation: Alert to self and intermittent to time, knows year    BEHAVIOR & AGGRESSION TOOL COLOR: green  CIWA SCORE: 4, 3, 3  ABNL VS/O2: VSS on RA  MOBILITY: Up A2 GB and walker  PAIN MANAGMENT: Denies   DIET: DD1, nectar by spoon. Meds crushed in applesauce. SLP following  BOWEL/BLADDER: Incontinent B & B at times however this shift been asking to go to the bathroom. Continent, voiding bathroom> loose BM's--lactulose  ABNL LAB/BG:  K 3.3, replaced.  Cr 1.46, albumin 1.7, protein 6.4, bilirubin 2.1  DRAIN/DEVICES: PIV infusing  TELEMETRY RHYTHM: NSR   SKIN: bruised, scabbed, Mepilex RUE covering skin tear  TESTS/PROCEDURES: NA  D/C DAY/GOALS/PLACE: pending progress  OTHER IMPORTANT INFO: GI following. TCU recommended at discharge. Sitter at bedside.

## 2020-08-19 NOTE — PROGRESS NOTES
SPIRITUAL HEALTH SERVICES Progress Note  [facility] 64    Initiated visit due to LOS.  Pt spoke of desire to go turkey/deer hunting, familial support (sister), and spirituality.  SH provided supportive listening, encouragement, and prayer.  SH remains available for further support as needed.      Cristobal Gilbert  Chaplain Resident

## 2020-08-19 NOTE — PROGRESS NOTES
[facility]    Hospitalist Progress Note    Date of Service (when I saw the patient): 08/19/2020    Assessment & Plan   Abhijeet Mccauley is a 59 year old male who presents with fall.    Falls  Encephalopathy, likely metabolic and alcohol withdrawal  Hospitalized 7/2020 for UTI. With increasing confusion and falls. Recent alcohol treatment 7/4-8/5, began drinking immediately upon discharge. Confused on presentation. With at least 2 falls. 4-5 beers (at least) daily.  On presentation confusion and lethargy. Lactic acid elevated at 2.6. ammonia 54. WBC 13. UA with large blood and large leuest. Etoh 0.03 on admission. CT abdomen with cirrhosis with varices, small volume ascites, R 7th and 10th rib fractures. CT head/ c-spine negative for acute fracture. he is afebrile and with no abdominal pain, making SBP unlikely.  - 8/13 blood cultures no growth  - urine culture negative  - ceftriaxone 1g IV q24 hours started 8/13, with a negative urine and little evidence of SBP antibiotics were stopped on 8/18.  Lactate levels are baseline  - HE management as below  -His encephalopathy has significantly improved, patient still has occasional confusion, will request a PT/OT to evaluate the patient, speech is already recommending TCU, social work consulted.  Patient can be discharged to TCU possibly on 8/20 if  he is showing similar progress.    Alcoholic cirrhosis  Concern for hepatic encephalopathy  Longstanding history of alcohol use disorder with resultant cirrhosis. LFT's ok except alk phos (see below). No abdominal pain. Ammonia on admission at 54, improved 8/14 to 29. Total bili rising, 1.7 on 8/17  - lactulose 20 gm BID   - rifaximin 550 mg BID   -Prior to admission diuretics are on hold, he does received a dose on 8/18 of Lasix, with good effect.  Can cautiously restarted depending on his fluid status and blood pressures prior to discharge.    Anemia  Known history of varices with banding. Unable to obtain  history on admission.  ? Reported hemoccult positive. Hemoglobin on admission is 6.6.  INR on admission is 1.45.    - transfused x 1 unit in ED, appreciate gastroenterology input, continue Protonix 40 mg IVtwice daily, his hemoglobin is stable, he was started on lactulose and plan to continue with the rifaximin.  -Hemoglobin is stable today at 10, will need to repeat it on discharge.    Alcohol use disorder  H/o Etoh withdrawal seizures  Longstanding h/o Etoh use/ abuse. Multiple recent admissions for Etoh related issues. H/o Etoh w/d seizures.   -Patient was on CIWA protocol, currently no signs of withdrawal now, continue multivitamin, Per PT and OT is requested to evaluate patient, currently speech is suggesting TCU.    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 Lino  Baseline creatinine appears to be in the 1.4-1.9 range as of recent, 1.68 on admission. Patient with CT on admission showing ureteric stent present.  - avoid nephrotoxins  - creatinine stable at 1.46 on 8/19    Hypernatremia  Hypokalemia  Replace electrolytes as needed, will stop the D5W drip as hypernatremia resolved.  BMP in a.m.    Respiratory distress 8/16  Type II NSTEMI  Early am 8/16 with respiratory distress. Audible expiratory wheezes appreciated. Otherwise lung sounds clear. O2 sats 97% on 2L. Vitals otherwise stable. CXR clear, BNP elevated 5477. Troponin detectable at 0.070. VBG 7.35/36/30  -Patient did receive a dose of IV Lasix with significant diuresis, currently creatinine is stable at 1.46.  - repeat troponin am 8/18 0.027  - recent echo 6/2020 with EF 55-60, mild concentric LV hypertrophy. Will monitor for repeat need  - suspect underlying COPD, less likely acute HFpEF.  Chest x-ray is clear    Hypoglycemia  Noted overnight 8/15 with BS to 62, 59. Started on D5W. Hypoglycemia 2/2 NPO status with underlying liver disease and impaired gluconeogenesis.   - d50 as  needed  -Hypernatremia has resolved, will stop D5W.  Diet also advanced to dysphagia level 2 for him today.    Elevated alk phos  Seen by GI inpatient 6/23. Suspected Alk phos elevation multifactorial, related to cirrhosis, recent fractures. At that time further workup  (EUS with liver biopsy) unlikely to  with ongoing active Etoh history and lack of follow up  - monitor for now    Right 10th posterior rib fracture  Patient with CT on admission showing comminuted segmental fracture of right seventh rib with bilateral rib fractures otherwise stable, as well as right posterior rib fracture with increased displacement.  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  DVT Prophylaxis: Pneumatic Compression Devices  Code Status: Full Code    Disposition: Expected discharge possibly 8/20 TCU    Nola Mora MD      Interval History   Patient is alert oriented x3, he can recollect his family details, he is aware where he is currently now and understands that he has significant weakness than before.  He had few bowel movements but he denied having any to me, occasional confusion present.    -Data reviewed today: I reviewed all new labs and imaging results over the last 24 hours. I personally reviewed no images or EKG's today.    Physical Exam   Temp: 98.4  F (36.9  C) Temp src: Oral BP: (!) 141/70 Pulse: 93 RETIRE: Heart Rate: 94 Resp: 18 SpO2: 98 % O2 Device: None (Room air)    Vitals:    08/14/20 0008 08/15/20 0042 08/19/20 0430   Weight: 76.5 kg (168 lb 10.4 oz) 78.6 kg (173 lb 4.5 oz) 74.9 kg (165 lb 2 oz)     Vital Signs with Ranges  Temp:  [98.3  F (36.8  C)-98.8  F (37.1  C)] 98.4  F (36.9  C)  Pulse:   [93] 93  RETIRE: Heart Rate:  [89-94] 94  Resp:  [16-18] 18  BP: (125-141)/(65-75) 141/70  SpO2:  [98 %-100 %] 98 %  I/O last 3 completed shifts:  In: 1036 [P.O.:200; I.V.:836]  Out: 100 [Urine:100]    Constitutional: Awake, alert, oriented times 3 occasional confusion noted  Respiratory: Clear to auscultation bilaterally, no crackles or wheezing  Cardiovascular: Regular rate and rhythm, normal S1 and S2, and no murmur noted  GI: Normal bowel sounds, soft, non-distended, non-tender  Skin/Integumen: No rashes, no cyanosis, trace lower extremity edema present  Neuro : moving all 4 extremities, no focal deficit noted       Medications     D5W 75 mL/hr at 08/19/20 0817       fluticasone-vilanterol  1 puff Inhalation Daily     folic acid  1 mg Oral Daily     lactulose  20 g Oral BID     miconazole   Topical BID     multivitamin w/minerals  1 tablet Oral Daily     pantoprazole (PROTONIX) IV  40 mg Intravenous BID     rifaximin  550 mg Oral BID     sodium chloride (PF)  3 mL Intracatheter Q8H       Data   Recent Labs   Lab 08/19/20  0936 08/18/20  0842 08/17/20  2226 08/17/20  0816  08/16/20  0130  08/14/20  0034 08/13/20  1620   WBC 8.4 7.5  --  8.8  --  7.2   < > 9.5 13.0*   HGB 10.0* 9.6*  --  9.7*   < > 8.9*   < > 8.3* 6.6*   MCV 88 89  --  89  --  89   < > 88 89   * 121*  --  151  --  169   < > 161 182   INR  --  1.61*  --   --   --   --   --  1.44* 1.45*    147*  --  149*  --  152*   < > 146* 141   POTASSIUM 3.3* 3.4 3.5 3.1*  --  4.4   < > 3.7 3.5   CHLORIDE 113* 118*  --  119*  --  128*   < > 118* 112*   CO2 22 24  --  24  --  19*   < > 23 19*   BUN 10 9  --  12  --  15   < > 19 20   CR 1.46* 1.43*  --  1.51*  --  1.48*   < > 1.46* 1.68*   ANIONGAP 6 5  --  6  --  5   < > 5 10   BEE 8.6 8.6  --  8.5  --  8.3*   < > 8.2* 8.4*   * 91 93 101*  --  80   < > 84 72   ALBUMIN 2.7* 2.5*  --  2.7*  --  2.7*  --  2.6* 2.5*   PROTTOTAL 6.4* 6.0*  --  6.6*  --  6.3*  --  6.3* 6.2*   BILITOTAL 2.1* 1.9*  --   1.7*  --  1.5*  --  1.3 1.1   ALKPHOS 407* 374*  --  391*  --  393*  --  470* 487*   ALT 21 22  --  25  --  30  --  25 26   AST 23 26  --  34  --  76*  --  26 32   LIPASE  --   --   --   --   --   --   --   --  135   TROPI  --  0.027  --   --   --  0.070*  --   --   --     < > = values in this interval not displayed.       No results found for this or any previous visit (from the past 24 hour(s)).

## 2020-08-19 NOTE — PLAN OF CARE
DATE & TIME: 8/18 1900 - 8/19 0730  Cognitive Concerns/ Orientation: Alert to self and intermittent to time, knows year    BEHAVIOR & AGGRESSION TOOL COLOR: green  CIWA SCORE: 2, 3 & 3  ABNL VS/O2: VSS on RA  MOBILITY: Up A2 GB and walker  PAIN MANAGMENT: Denies   DIET: DD1, nectar by spoon. Meds crushed in applesauce. SLP following  BOWEL/BLADDER: Incontinent B & B at times however this shift been asking to go to the bathroom. Continent, voiding bathroom> Large watery BM x1  ABNL LAB/BG: BG 93, 98 & 84  DRAIN/DEVICES: PIV infusing  TELEMETRY RHYTHM: NSR   SKIN: bruised, scabbed, Mepilex RUE covering skin tear  TESTS/PROCEDURES: NA  D/C DAY/GOALS/PLACE: pending progress  OTHER IMPORTANT INFO: GI following. TCU recommended at discharge. Sitter at bedside.

## 2020-08-19 NOTE — PROGRESS NOTES
[facility]  Gastroenterology Progress Note     Abhijeet Mccauley MRN# [id number]   YOB: 1960 Age: 59 year old          Assessment and Plan:    Interval History: Patient alert. Oriented. Hemoglobin stable. Tolerating diet.     Anemia  Encephalopathy  Abhijeet Mccauley is a 59 year old male who was admitted on 8/13/2020. GI consulted for severe anemia in the setting of Etoh cirrhosis w/ prior documentation of varices.      - Ok with GI to discharge patient. Will need close f/u with GI ( plan f/u visit in 1-2 weeks)  - Hemoglobin stable   - C/w PPI  - Diet as tolerated  - Continue with rifaximin and lactulose           Interval History:   no new complaints, denies chest pain, denies shortness of breath, denies abdominal pain, alert, oriented to person, place and time and doing well; no cp, sob, n/v/d, or abd pain.              Review of Systems:   C: NEGATIVE for fever, chills, change in weight  E/M: NEGATIVE for ear, mouth and throat problems  R: NEGATIVE for significant cough or SOB  CV: NEGATIVE for chest pain, palpitations or peripheral edema             Medications:   I have reviewed this patient's current medications    fluticasone-vilanterol  1 puff Inhalation Daily     folic acid  1 mg Oral Daily     lactulose  20 g Oral BID     miconazole   Topical BID     multivitamin w/minerals  1 tablet Oral Daily     pantoprazole (PROTONIX) IV  40 mg Intravenous BID     rifaximin  550 mg Oral BID     sodium chloride (PF)  3 mL Intracatheter Q8H                  Physical Exam:   Vitals were reviewed  Vital Signs with Ranges  Temp:  [98.3  F (36.8  C)-98.8  F (37.1  C)] 98.4  F (36.9  C)  Pulse:  [93] 93  RETIRE: Heart Rate:  [89-94] 94  Resp:  [16-18] 18  BP: (125-141)/(65-75) 141/70  SpO2:  [98 %-100 %] 98 %  I/O last 3 completed shifts:  In: 1036 [P.O.:200; I.V.:836]  Out: 100 [Urine:100]  Constitutional: healthy, alert and no distress   Cardiovascular: negative, PMI normal. No lifts,  heaves, or thrills. RRR. No murmurs, clicks gallops or rub  Respiratory: negative, Percussion normal. Good diaphragmatic excursion. Lungs clear  Head: Normocephalic. No masses, lesions, tenderness or abnormalities  Neck: Neck supple. No adenopathy. Thyroid symmetric, normal size,, Carotids without bruits.  Abdomen: Abdomen soft, non-tender. BS normal. No masses, organomegaly           Data:   I reviewed the patient's new clinical lab test results.   Recent Labs   Lab Test 08/19/20  0936 08/18/20  0842 08/17/20  0816  08/14/20  0034 08/13/20  1620   WBC 8.4 7.5 8.8   < > 9.5 13.0*   HGB 10.0* 9.6* 9.7*   < > 8.3* 6.6*   MCV 88 89 89   < > 88 89   * 121* 151   < > 161 182   INR  --  1.61*  --   --  1.44* 1.45*    < > = values in this interval not displayed.     Recent Labs   Lab Test 08/19/20  0936 08/18/20  0842 08/17/20  2226 08/17/20  0816   POTASSIUM 3.3* 3.4 3.5 3.1*   CHLORIDE 113* 118*  --  119*   CO2 PENDING 24  --  24   BUN PENDING 9  --  12   ANIONGAP PENDING 5  --  6     Recent Labs   Lab Test 08/19/20  0936 08/18/20  0842 08/17/20  0816  08/13/20  1620  07/17/20  1639  07/11/20  1458  06/12/20  1005  03/05/19  0934   ALBUMIN PENDING 2.5* 2.7*   < > 2.5*   < >  --    < >  --    < > 2.9*   < > 3.5   BILITOTAL PENDING 1.9* 1.7*   < > 1.1   < >  --    < >  --    < > 1.9*   < > 2.2*   ALT PENDING 22 25   < > 26   < >  --    < >  --    < > 19   < > 18   AST PENDING 26 34   < > 32   < >  --    < >  --    < > 38   < > 38   PROTEIN  --   --   --   --  Negative  --  Negative  --  Negative   < >  --    < >  --    LIPASE  --   --   --   --  135  --   --   --   --   --  192  --  314    < > = values in this interval not displayed.       I reviewed the patient's new imaging results.    All laboratory data reviewed  All imaging studies reviewed by me.    Jojo Romano PA-C,  8/19/2020  [facility]  Office : [phone]  Cell: [phone] (Dr. Fish)  Cell: [phone] (Jojo Romano  [place])

## 2020-08-20 ENCOUNTER — APPOINTMENT (OUTPATIENT)
Dept: SPEECH THERAPY | Facility: CLINIC | Age: 60
End: 2020-08-20
Attending: INTERNAL MEDICINE
Payer: COMMERCIAL

## 2020-08-20 ENCOUNTER — APPOINTMENT (OUTPATIENT)
Dept: OCCUPATIONAL THERAPY | Facility: CLINIC | Age: 60
End: 2020-08-20
Attending: INTERNAL MEDICINE
Payer: COMMERCIAL

## 2020-08-20 ENCOUNTER — APPOINTMENT (OUTPATIENT)
Dept: PHYSICAL THERAPY | Facility: CLINIC | Age: 60
End: 2020-08-20
Attending: INTERNAL MEDICINE
Payer: COMMERCIAL

## 2020-08-20 LAB
ALBUMIN SERPL-MCNC: 2.8 G/DL (ref 3.4–5)
ALP SERPL-CCNC: 395 U/L (ref 40–150)
ALT SERPL W P-5'-P-CCNC: 21 U/L (ref 0–70)
ANION GAP SERPL CALCULATED.3IONS-SCNC: 7 MMOL/L (ref 3–14)
AST SERPL W P-5'-P-CCNC: 27 U/L (ref 0–45)
BILIRUB SERPL-MCNC: 1.9 MG/DL (ref 0.2–1.3)
BUN SERPL-MCNC: 9 MG/DL (ref 7–30)
CALCIUM SERPL-MCNC: 8.6 MG/DL (ref 8.5–10.1)
CHLORIDE SERPL-SCNC: 115 MMOL/L (ref 94–109)
CO2 SERPL-SCNC: 19 MMOL/L (ref 20–32)
CREAT SERPL-MCNC: 1.45 MG/DL (ref 0.66–1.25)
ERYTHROCYTE [DISTWIDTH] IN BLOOD BY AUTOMATED COUNT: 20.4 % (ref 10–15)
GFR SERPL CREATININE-BSD FRML MDRD: 51 ML/MIN/{1.73_M2}
GLUCOSE BLDC GLUCOMTR-MCNC: 107 MG/DL (ref 70–99)
GLUCOSE BLDC GLUCOMTR-MCNC: 70 MG/DL (ref 70–99)
GLUCOSE BLDC GLUCOMTR-MCNC: 73 MG/DL (ref 70–99)
GLUCOSE BLDC GLUCOMTR-MCNC: 87 MG/DL (ref 70–99)
GLUCOSE BLDC GLUCOMTR-MCNC: 94 MG/DL (ref 70–99)
GLUCOSE SERPL-MCNC: 84 MG/DL (ref 70–99)
HCT VFR BLD AUTO: 32.5 % (ref 40–53)
HGB BLD-MCNC: 9.8 G/DL (ref 13.3–17.7)
MCH RBC QN AUTO: 26.8 PG (ref 26.5–33)
MCHC RBC AUTO-ENTMCNC: 30.2 G/DL (ref 31.5–36.5)
MCV RBC AUTO: 89 FL (ref 78–100)
PLATELET # BLD AUTO: 145 10E9/L (ref 150–450)
POTASSIUM SERPL-SCNC: 3.8 MMOL/L (ref 3.4–5.3)
PROT SERPL-MCNC: 6.3 G/DL (ref 6.8–8.8)
RBC # BLD AUTO: 3.66 10E12/L (ref 4.4–5.9)
SODIUM SERPL-SCNC: 141 MMOL/L (ref 133–144)
WBC # BLD AUTO: 8.1 10E9/L (ref 4–11)

## 2020-08-20 PROCEDURE — 85027 COMPLETE CBC AUTOMATED: CPT | Performed by: INTERNAL MEDICINE

## 2020-08-20 PROCEDURE — 97161 PT EVAL LOW COMPLEX 20 MIN: CPT | Mod: GP | Performed by: PHYSICAL THERAPIST

## 2020-08-20 PROCEDURE — 80053 COMPREHEN METABOLIC PANEL: CPT | Performed by: INTERNAL MEDICINE

## 2020-08-20 PROCEDURE — 25000132 ZZH RX MED GY IP 250 OP 250 PS 637: Performed by: INTERNAL MEDICINE

## 2020-08-20 PROCEDURE — 25000132 ZZH RX MED GY IP 250 OP 250 PS 637: Performed by: PHYSICIAN ASSISTANT

## 2020-08-20 PROCEDURE — C9113 INJ PANTOPRAZOLE SODIUM, VIA: HCPCS | Performed by: INTERNAL MEDICINE

## 2020-08-20 PROCEDURE — 97530 THERAPEUTIC ACTIVITIES: CPT | Mod: GP | Performed by: PHYSICAL THERAPIST

## 2020-08-20 PROCEDURE — 99232 SBSQ HOSP IP/OBS MODERATE 35: CPT | Performed by: INTERNAL MEDICINE

## 2020-08-20 PROCEDURE — 97116 GAIT TRAINING THERAPY: CPT | Mod: GP | Performed by: PHYSICAL THERAPIST

## 2020-08-20 PROCEDURE — 97535 SELF CARE MNGMENT TRAINING: CPT | Mod: GO

## 2020-08-20 PROCEDURE — 00000146 ZZHCL STATISTIC GLUCOSE BY METER IP

## 2020-08-20 PROCEDURE — 12000000 ZZH R&B MED SURG/OB

## 2020-08-20 PROCEDURE — 97166 OT EVAL MOD COMPLEX 45 MIN: CPT | Mod: GO

## 2020-08-20 PROCEDURE — 25000132 ZZH RX MED GY IP 250 OP 250 PS 637: Performed by: HOSPITALIST

## 2020-08-20 PROCEDURE — 92526 ORAL FUNCTION THERAPY: CPT | Mod: GN

## 2020-08-20 PROCEDURE — 36415 COLL VENOUS BLD VENIPUNCTURE: CPT | Performed by: INTERNAL MEDICINE

## 2020-08-20 PROCEDURE — 25000128 H RX IP 250 OP 636: Performed by: INTERNAL MEDICINE

## 2020-08-20 RX ADMIN — MICONAZOLE NITRATE: 20 POWDER TOPICAL at 09:30

## 2020-08-20 RX ADMIN — PANTOPRAZOLE SODIUM 40 MG: 40 INJECTION, POWDER, FOR SOLUTION INTRAVENOUS at 20:57

## 2020-08-20 RX ADMIN — TRAZODONE HYDROCHLORIDE 50 MG: 50 TABLET ORAL at 22:02

## 2020-08-20 RX ADMIN — LACTULOSE 20 G: 10 SOLUTION ORAL at 20:57

## 2020-08-20 RX ADMIN — RIFAXIMIN 550 MG: 550 TABLET ORAL at 20:57

## 2020-08-20 RX ADMIN — PANTOPRAZOLE SODIUM 40 MG: 40 INJECTION, POWDER, FOR SOLUTION INTRAVENOUS at 09:29

## 2020-08-20 ASSESSMENT — ACTIVITIES OF DAILY LIVING (ADL)
ADLS_ACUITY_SCORE: 14.5
ADLS_ACUITY_SCORE: 14
ADLS_ACUITY_SCORE: 14.5
ADLS_ACUITY_SCORE: 14.5

## 2020-08-20 NOTE — PLAN OF CARE
DATE & TIME: 8/19/2020 6162-5467    Cognitive Concerns/ Orientation : Pt A/Ox1, oriented to self. Confused.   BEHAVIOR & AGGRESSION TOOL COLOR: Green/Yellow, pt attempting to leave at start of shift yelling at us that he needed to go home for a doctor's appointment tomorrow. Yelled at us not to touch him. Pt was given 2mg of IV Haldol and has been calm and cooperative since medication.  CIWA SCORE: 6/3/3   ABNL VS/O2: VSS on RA  MOBILITY: Assist x1-2 with walker and gait belt, fall risk  PAIN MANAGMENT: Denies  DIET: DD1 with nectar liquid by spoon. Pt takes medications crushed in applesauce/pudding.  BOWEL/BLADDER: Incontinent of bowel and bladder at times  ABNL LAB/BG: K 4.0/BG 92/70  DRAIN/DEVICES: IV SL  TELEMETRY RHYTHM: NSR  SKIN: Bruised, scabbed. Redness to groin. Skin tear on RUE, Mepilex CDI.  D/C DAY/GOALS/PLACE: Discharge to TCU pending progress  OTHER IMPORTANT INFO: Sitter at bedside. Pt impulsive.

## 2020-08-20 NOTE — PROGRESS NOTES
08/20/20 1100   Quick Adds   Type of Visit Initial Occupational Therapy Evaluation   Living Environment   Lives With alone   Living Arrangements extended care facility   Home Accessibility other (see comments)  (unable to provide information)   Living Environment Comment Pt unable to provide living history. Per chart, Pt was living in [facility].    Functional Level   Ambulation 1-->assistive equipment  (4 WW)   Transferring 1-->assistive equipment   Toileting 0-->independent   Bathing 0-->independent   Dressing 0-->independent   Eating 0-->independent   Communication 0-->understands/communicates without difficulty   Cognition 2 - difficulty with organizing thoughts   Fall history within last six months yes   Number of times patient has fallen within last six months 5   Which of the above functional risks had a recent onset or change? fall history;bathing;toileting;transferring;cognition;ambulation   Prior Functional Level Comment Information gathered primarily from chart review. Pt did state he uses a 4WW at baseline. Prior level of assist unclear, but  pt states independence w/ADL's, reports he does not drive   General Information   Onset of Illness/Injury or Date of Surgery - Date 08/13/20   Referring Physician Nola Mora MD    Patient/Family Goals Statement go home   Additional Occupational Profile Info/Pertinent History of Current Problem Pt admitted 7/17 from TCU with AMS, found to have UTI. Pt did have recent hospitalization with discharge to TCU 6/25. Pt also with recent L hip replacement late May/early June of this year. PMH significant for alcohol use disorder, alcohol withdrawal seizures, EtOH cirrhosis, COPD, CKD III, AML (diagnosed 2008, s/p chemotherapy, complete remission >10 years).   Precautions/Limitations fall precautions  (1:1, tele)   Weight-Bearing Status - LUE full weight-bearing   Weight-Bearing Status - RUE full weight-bearing   Weight-Bearing Status - LLE full  "weight-bearing   Weight-Bearing Status - RLE full weight-bearing   Cognitive Status Examination   Orientation other (see comments)  (person, year, place)   Level of Consciousness confused;alert   Follows Commands (Cognition) 50-74% accuracy   Memory impaired   Transfer Skill: Sit to Stand   Level of [place]: Sit/Stand contact guard   Physical Assist/Nonphysical Assist: Sit/Stand supervision   Transfer Skill: Sit to Stand full weight-bearing   Transfer Skill: Toilet Transfer   Level of [place]: Toilet contact guard   Physical Assist/Nonphysical Assist: Toilet supervision   Weight-Bearing Restrictions: Toilet full weight-bearing   Assistive Device grab bars   Activities of Daily Living Analysis   Impairments Contributing to Impaired Activities of Daily Living balance impaired;cognition impaired   General Therapy Interventions   Planned Therapy Interventions IADL retraining;ADL retraining;cognition   Clinical Impression   Criteria for Skilled Therapeutic Interventions Met yes, treatment indicated   OT Diagnosis impaired I/ADL   Influenced by the following impairments impaired cognition, impaired balance, decreased activity tolerance, decreased strength   Assessment of Occupational Performance 1-3 Performance Deficits   Identified Performance Deficits functional mobility, medication management, finances, meal prep, bathing   Clinical Decision Making (Complexity) Moderate complexity   Therapy Frequency 3x/week   Predicted Duration of Therapy Intervention (days/wks) 7   Anticipated Discharge Disposition Transitional Care Facility   Risks and Benefits of Treatment have been explained. Yes   Patient, Family & other staff in agreement with plan of care Yes   [facility] TM \"6 Clicks\"   2016, Trustees of [facility], under license to [employer].  All rights reserved.   6 Clicks Short Forms Daily Activity Inpatient Short Form   [facility] AM-PAC  \"6 Clicks\" Daily Activity Inpatient Short " Form   1. Putting on and taking off regular lower body clothing? 3 - A Little   2. Bathing (including washing, rinsing, drying)? 3 - A Little   3. Toileting, which includes using toilet, bedpan or urinal? 3 - A Little   4. Putting on and taking off regular upper body clothing? 4 - None   5. Taking care of personal grooming such as brushing teeth? 3 - A Little   6. Eating meals? 4 - None   Daily Activity Raw Score (Score out of 24.Lower scores equate to lower levels of function) 20   Total Evaluation Time   Total Evaluation Time (Minutes) 10

## 2020-08-20 NOTE — PROGRESS NOTES
"   08/20/20 1520   Quick Adds   Type of Visit Initial PT Evaluation   Living Environment   Lives With alone   Living Arrangements house   Home Accessibility stairs to enter home   Number of Stairs, Main Entrance 2   Stair Railings, Main Entrance railings on both sides of stairs   Transportation Anticipated family or friend will provide   Living Environment Comment per chart from 7/19: \"Recent alcohol treatment 7/4-8/5, began drinking immediately upon discharge\"; patient having difficulty giving PLOF   Self-Care   Usual Activity Tolerance good   Current Activity Tolerance moderate   Equipment Currently Used at Home walker, rolling  (2WW or 4WW)   Activity/Exercise/Self-Care Comment normally reports independence with a walker   Functional Level Prior   Ambulation 1-->assistive equipment  (4WW)   Transferring 1-->assistive equipment  (4WW)   Cognition 2 - difficulty with organizing thoughts   Fall history within last six months yes   Number of times patient has fallen within last six months 5  (per chart; patient unsure)   Which of the above functional risks had a recent onset or change? fall history;bathing;toileting;transferring;cognition;ambulation   Prior Functional Level Comment poor historian; reports he uses a 4WW; difficulty giving other PLOF   General Information   Onset of Illness/Injury or Date of Surgery - Date 08/13/20   Referring Physician Nola Mora MD    Patient/Family Goals Statement return home   Pertinent History of Current Problem (include personal factors and/or comorbidities that impact the POC) per chart: Pt admitted 7/17 from TCU with AMS, found to have UTI. Pt did have recent hospitalization with discharge to TCU 6/25. Pt also with recent L hip replacement late May/early June of this year. PMH significant for alcohol use disorder, alcohol withdrawal seizures, EtOH cirrhosis, COPD, CKD III, AML (diagnosed 2008, s/p chemotherapy, complete remission >10 years). admitted again for falls and " "encephalopathy after return to drinking after discharge from TCU   Precautions/Limitations fall precautions   General Observations patient sitting at EOB; sitter present   Cognitive Status Examination   Orientation person  (knew month; stated \"12\" for year)   Level of Consciousness alert;confused   Follows Commands and Answers Questions 75% of the time;100% of the time   Personal Safety and Judgment impaired;impulsive;at risk behaviors demonstrated   Memory impaired   Cognitive Comment significant impairment per session and OT report; defer to OT for any further workup   Pain Assessment   Patient Currently in Pain No   Posture    Posture Comments forward flexed in standing/walking   Range of Motion (ROM)   ROM Comment WFL   Strength   Strength Comments functional weakness; reports being below his normal   Bed Mobility   Bed Mobility Comments NT; patient wanted to remain sitting   Transfer Skills   Transfer Comments sit>stand with CGA and safety cues; use of walker in standing; impulsively standing up   Gait   Gait Comments gait in room and hallway with walker x 25 feet; CGA for safety   Balance   Balance Comments baseline impairment; multiple falls; use of walker   General Therapy Interventions   Planned Therapy Interventions bed mobility training;gait training;strengthening;transfer training;progressive activity/exercise   Clinical Impression   Criteria for Skilled Therapeutic Intervention yes, treatment indicated   PT Diagnosis impaired gait/transfers; weakness   Influenced by the following impairments weakness; impaired cognition; impaired balance   Functional limitations due to impairments impaired independence with functional mobility   Clinical Presentation Stable/Uncomplicated   Clinical Presentation Rationale clinical judgement; level of assist   Clinical Decision Making (Complexity) Low complexity   Therapy Frequency 5x/week   Predicted Duration of Therapy Intervention (days/wks) 3 days   Anticipated " "Equipment Needs at Discharge front wheeled walker   Anticipated Discharge Disposition Transitional Care Facility   Risk & Benefits of therapy have been explained Yes   Patient, Family & other staff in agreement with plan of care Yes   [facility] TM \"6 Clicks\"   2016, Trustees of [facility], under license to [employer].  All rights reserved.   6 Clicks Short Forms Basic Mobility Inpatient Short Form   [facility] AM-PAC  \"6 Clicks\" V.2 Basic Mobility Inpatient Short Form   1. Turning from your back to your side while in a flat bed without using bedrails? 4 - None   2. Moving from lying on your back to sitting on the side of a flat bed without using bedrails? 3 - A Little   3. Moving to and from a bed to a chair (including a wheelchair)? 3 - A Little   4. Standing up from a chair using your arms (e.g., wheelchair, or bedside chair)? 3 - A Little   5. To walk in hospital room? 3 - A Little   6. Climbing 3-5 steps with a railing? 2 - A Lot   Basic Mobility Raw Score (Score out of 24.Lower scores equate to lower levels of function) 18   Total Evaluation Time   Total Evaluation Time (Minutes) 10     "

## 2020-08-20 NOTE — PLAN OF CARE
Discharge Planner SLP   Patient plan for discharge: Did not discuss  Current status: Pt tolerated thin liquids without overt s/sx of aspiration. Regular solids notable for increased mastication time given poor dentition and no dentures. Soft solids deemed appropriate     Recommend mechanical/dental soft diet and thin liquids - no straws. Pt to be seated upright, take small bites/sips, eat/drink at a slow rate, alternate solids and liquids and remain upright for at least 30 minutes after eating.     Barriers to return to prior living situation: Below baseline, cognition/safety  Recommendations for discharge: TCU  Rationale for recommendations: SLP at next level of for dysphagia management. Pt with potential to meet dysphagia goals prior to discharge          Entered by: Martina Herbert 08/20/2020 2:35 PM

## 2020-08-20 NOTE — PROGRESS NOTES
"CLINICAL NUTRITION SERVICES  -  ASSESSMENT NOTE    Recommendations Ordered by Registered Dietitian (RD):   - diet per SLP  - add supplements w/ meals  B - gelatein high protein jello (150 kcal, 20 g pro)  L - HT smoothie (245 kcal, 16 g pro)  D - magic shake (470 kcal, 17 g pro)   - continue micronutrients as ordered    Malnutrition: (8/20)   % Weight Loss:  None noted  % Intake:  <50% for >/= 5 days (severe malnutrition)  Subcutaneous Fat Loss:  Orbital region mild depletion and Upper arm region mild depletion  Muscle Loss:  Dorsal hand region mild depletion  Fluid Retention:  None noted    Malnutrition Diagnosis: Non-Severe malnutrition  In Context of:  Chronic illness or disease  Environmental or social circumstances     REASON FOR ASSESSMENT  Abhijeet Mccauley is a 59 year old male seen by Registered Dietitian for LOS    NUTRITION HISTORY  - Information obtained from chart review, brief visit with patient this morning.     - recent admission in June to [facility], required enteral nutrition at that time due to lethargy and dysphagia.   - patient admitted on 8/13 to [facility] with recurrent falls in the setting of alcohol dependence, ascites, acute metabolic encephalopathy, suspected spontaneous bacterial peritonitis vs UTI.   - Had stayed 1 month at a drug and alcohol treatment facility, from July 4 - August 5. Patient reportedly began drinking immediately after his departure.     - patient is unable to provide much history, though does note that his weight has trended \"high 180s\" as one point, and is now in the 150s (see wt trending below). States that he \"overdid it\" with weight loss.   - NKFA. Pt states that he does not do great with milk.     CURRENT NUTRITION ORDERS  Diet Order:     Dysphagia Diet Level 1 (advanced on 8/18)    Current Intake/Tolerance:  Patient had been NPO from 8/13-8/17. Since diet advanced 2 days ago he has received 2 meals/day, with intakes ranging 0/bites - 75%.    8/18 - ordered 1432 kcal, 52 g " "pro  8/19 - ordered 1423 kcal, 53 g pro    Patient alludes to appetite being good, though sitter at bedside shakes head 'no' at this.     NUTRITION FOCUSED PHYSICAL ASSESSMENT FOR DIAGNOSING MALNUTRITION)  Yes         Observed:    Muscle wasting (refer to documentation in Malnutrition section) and Subcutaneous fat loss (refer to documentation in Malnutrition section)    Obtained from Chart/Interdisciplinary Team:  Patient A/O x1, oriented to self.     CT abdomen on admission showed cirrhosis with varices including the gastroesophageal junction, small volume ascites, increased displacement of the right 10th posterior rib fracture, comminuted segmental fracture of the right seventh rib with bilateral rib fractures otherwise stable    Kartik - Nutrition 2; Total 15  Stooling: daily, up to 5x    ANTHROPOMETRICS  Height: 5' 9\"  Weight: 165 lbs 1.99 oz (74.9 kg)   Body mass index is 24.38 kg/m .  Weight Status:  Normal BMI  IBW: 72.7 kg   % IBW: 103%  Weight History: weight appears to be relatively stable (180# taken 8/13 outlier - likely a reported weight)  Wt Readings from Last 10 Encounters:   08/19/20 74.9 kg (165 lb 2 oz)   08/13/20 81.6 kg (180 lb)   07/21/20 69.8 kg (153 lb 14.4 oz)   07/16/20 74.7 kg (164 lb 11.2 oz)   07/14/20 74 kg (163 lb 3.2 oz)   07/13/20 69.5 kg (153 lb 3.2 oz)   07/08/20 74 kg (163 lb 3.2 oz)   07/06/20 74 kg (163 lb 3.2 oz)   07/06/20 74 kg (163 lb 3.2 oz)   06/28/20 74.5 kg (164 lb 4.8 oz)       LABS  Labs reviewed  K 3.3 (L)  Cr 1.46 (H)  AlkPhos 407 (H)    MEDICATIONS  Medications reviewed  Folic acid 1 mg, Thera vit M daily  Lactulose 20 g BID    ASSESSED NUTRITION NEEDS PER APPROVED PRACTICE GUIDELINES:  Dosing Weight 74.9 kg   Estimated Energy Needs: 2670-3915 kg  kcals (25-30 Kcal/Kg)  Justification: maintenance  Estimated Protein Needs:  grams protein (1.2-1.5 g pro/Kg)  Justification: preservation of lean body mass  Estimated Fluid Needs: 1 mL/kcal  Justification: " maintenance    MALNUTRITION:  % Weight Loss:  None noted  % Intake:  <50% for >/= 5 days (severe malnutrition)  Subcutaneous Fat Loss:  Orbital region mild depletion and Upper arm region mild depletion  Muscle Loss:  Dorsal hand region mild depletion  Fluid Retention:  None noted    Malnutrition Diagnosis: Non-Severe malnutrition  In Context of:  Chronic illness or disease  Environmental or social circumstances    NUTRITION DIAGNOSIS:  Inadequate oral intake related to altered mentation and difficulty swallowing as a result of etoh abuse as evidenced by intakes ranging 0-75% for smaller meals since diet advancement 2 days ago, initially pt NPO x5 full days.       NUTRITION INTERVENTIONS  Recommendations / Nutrition Prescription  Diet per SLP      Add supplements w/ meals  B - gelatein high protein jello (150 kcal, 20 g pro)  L - HT smoothie (245 kcal, 16 g pro)  D - magic shake (470 kcal, 17 g pro)     Continue micronutrients as ordered     Implementation  Nutrition education: Provided education on role of supplements. Pt not appropriate for extensive education at this time.   Medical Food Supplement: as above       Nutrition Goals  Intake of at least 50% meals BID-TID, intake of 1+ supplements daily.       MONITORING AND EVALUATION:  Progress towards goals will be monitored and evaluated per protocol and Practice Guidelines    Cherri Berumen RD, LD  Pager: [phone]

## 2020-08-20 NOTE — PLAN OF CARE
Cognitive Concerns/ Orientation : Pt A/Ox1, oriented to self. Confused.   BEHAVIOR & AGGRESSION TOOL COLOR: Green/Yellow, confusion into evenings typical.  Haldol has helped.  CIWA SCORE: 3,3, discontinued.   ABNL VS/O2: VSS on RA  MOBILITY: up 1 ,GB&W  PAIN MANAGMENT: Denies  DIET: DD1 with thin liquids. Refusing PO meds.    BOWEL/BLADDER: Incontinent of bowel and bladder at times  ABNL LAB/BG: K 3.8/BG ACHS 84,107, 94  DRAIN/DEVICES: IV SL  TELEMETRY RHYTHM: NSR, discontinued.  SKIN: Bruised, scabbed. Redness to groin, antifungal powder. Skin tear on RUE, Mepilex CDI.  D/C DAY/GOALS/PLACE: Discharge to TCU pending progress.  Possible discharge tomorrow.  OTHER IMPORTANT INFO: Sitter at bedside. Pt impulsive, confused.    Tele and CIWAs discontinued

## 2020-08-20 NOTE — PROGRESS NOTES
[facility]    Hospitalist Progress Note    Date of Service (when I saw the patient): 08/20/2020    Assessment & Plan   Abhijeet Mccauley is a 59 year old male who presents with fall.    Falls  Encephalopathy, likely metabolic and alcohol withdrawal and possible hepatic  Hospitalized 7/2020 for UTI. With increasing confusion and falls. Recent alcohol treatment 7/4-8/5, began drinking immediately upon discharge. Confused on presentation. With at least 2 falls. 4-5 beers (at least) daily.  On presentation confusion and lethargy. Lactic acid elevated at 2.6. ammonia 54. WBC 13. UA with large blood and large leuest. Etoh 0.03 on admission. CT abdomen with cirrhosis with varices, small volume ascites, R 7th and 10th rib fractures. CT head/ c-spine negative for acute fracture. he is afebrile and with no abdominal pain, making SBP unlikely.  He has improved but still a bit confused and impulsive, sitter in room.  - 8/13 blood cultures no growth  - urine culture negative  - ceftriaxone 1g IV q24 hours started 8/13, with a negative urine and little evidence of SBP antibiotics were stopped on 8/18.  Lactate levels are baseline  - HE management as below  -His encephalopathy has significantly improved, patient still has occasional confusion, will request a PT/OT to evaluate the patient, speech is already recommending TCU, social work consulted.  Patient can be discharged to TCU possibly on 8/20 if  he is showing similar progress.    Alcoholic cirrhosis  Concern for hepatic encephalopathy  Longstanding history of alcohol use disorder with resultant cirrhosis. LFT's ok except alk phos (see below). No abdominal pain. Ammonia on admission at 54, improved 8/14 to 29. Total bili rising, 1.7 on 8/17  - lactulose 20 gm BID   - rifaximin 550 mg BID   -Prior to admission diuretics are on hold, he does received a dose on 8/18 of Lasix, with good effect.  Can cautiously restarted depending on his fluid status and blood  pressures prior to discharge.    Anemia  Known history of varices with banding. Unable to obtain history on admission.  ? Reported hemoccult positive. Hemoglobin on admission is 6.6.  INR on admission is 1.45.    - transfused x 1 unit in ED, appreciate gastroenterology input, continue Protonix 40 mg IVtwice daily, his hemoglobin is stable, he was started on lactulose and plan to continue with the rifaximin.  -Hemoglobin is stable today at 10, will need to repeat it on discharge.    Alcohol use disorder  H/o Etoh withdrawal seizures  Longstanding h/o Etoh use/ abuse. Multiple recent admissions for Etoh related issues. H/o Etoh w/d seizures.   -Patient was on CIWA protocol, currently no signs of withdrawal now, continue multivitamin, Per PT and OT is requested to evaluate patient, currently speech is suggesting TCU.    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 Lino  Baseline creatinine appears to be in the 1.4-1.9 range as of recent, 1.68 on admission. Patient with CT on admission showing ureteric stent present.  - avoid nephrotoxins  - creatinine stable at 1.46 on 8/19    Hypernatremia  Hypokalemia  Replace electrolytes as needed, will stop the D5W drip as hypernatremia resolved.  BMP in a.m.    Respiratory distress 8/16  Type II NSTEMI  Early am 8/16 with respiratory distress. Audible expiratory wheezes appreciated. Otherwise lung sounds clear. O2 sats 97% on 2L. Vitals otherwise stable. CXR clear, BNP elevated 5477. Troponin detectable at 0.070. VBG 7.35/36/30  -Patient did receive a dose of IV Lasix with significant diuresis, currently creatinine is stable at 1.46.  - repeat troponin am 8/18 0.027  - recent echo 6/2020 with EF 55-60, mild concentric LV hypertrophy. Will monitor for repeat need  - suspect underlying COPD, less likely acute HFpEF.  Chest x-ray is clear    Hypoglycemia  Noted overnight 8/15 with BS to 62, 59. Started on D5W.  Hypoglycemia 2/2 NPO status with underlying liver disease and impaired gluconeogenesis.   - d50 as needed  -Hypernatremia has resolved, will stop D5W.  Diet also advanced to dysphagia level 2 for him today.    Elevated alk phos  Seen by GI inpatient . Suspected Alk phos elevation multifactorial, related to cirrhosis, recent fractures. At that time further workup  (EUS with liver biopsy) unlikely to  with ongoing active Etoh history and lack of follow up  - monitor for now    Right 10th posterior rib fracture  Patient with CT on admission showing comminuted segmental fracture of right seventh rib with bilateral rib fractures otherwise stable, as well as right posterior rib fracture with increased displacement.  -Monitor  -prn acetaminophen    History of AML  Diagnosed , s/p chemotherapy, complete remission >10 years  -Monitor    COPD  - Continue PTA inhaler Advair (Breo)  500-50 1 puff daily as able  - prn duonebs available    MAGALIE  Hold medications    FEN (fluids, electrolytes and nutrition): DDL2 with nectar thick liquids  Discussed with nursing.  DVT Prophylaxis: Pneumatic Compression Devices  Code Status: Full Code    Disposition: Expected discharge possibly  TCU    Norm Vale MD      Interval History   Patient is alert oriented x2-3.  Denies short of breath or fever, no abdominal pain.  Complains of lactulose and refusing some of medications, confirmed with nursing.  Mother recently  from brain cancer and lives with sister.      -Data reviewed today: I reviewed all new labs and imaging results over the last 24 hours. I personally reviewed no images or EKG's today.    Physical Exam   Temp: 98.3  F (36.8  C) Temp src: Oral BP: 111/53 Pulse: 80   Resp: 20 SpO2: 99 % O2 Device: None (Room air)    Vitals:    20 0008 08/15/20 0042 20 0430   Weight: 76.5 kg (168 lb 10.4 oz) 78.6 kg (173 lb 4.5 oz) 74.9 kg (165 lb 2 oz)     Vital Signs with Ranges  Temp:  [98.2  F (36.8   C)-98.5  F (36.9  C)] 98.3  F (36.8  C)  Pulse:  [80-90] 80  Resp:  [18-20] 20  BP: (111-137)/(53-73) 111/53  SpO2:  [99 %-100 %] 99 %  I/O last 3 completed shifts:  In: 1026 [P.O.:120; I.V.:906]  Out: 425 [Urine:425]    Constitutional: Awake, alert, oriented.  Aug. 13, 20.  Knows at [facility]?  Thinks on 3rd floor.    Respiratory: Clear to auscultation bilaterally, no crackles or wheezing  Cardiovascular: Regular rate and rhythm, normal S1 and S2, and no murmur noted  GI: Normal bowel sounds, soft, non-distended, non-tender  Skin/Integumen: No rashes, no cyanosis, trace lower extremity edema present  Neuro : moving all 4 extremities, no focal deficit noted no asterixis      Medications       fluticasone-vilanterol  1 puff Inhalation Daily     folic acid  1 mg Oral Daily     lactulose  20 g Oral BID     miconazole   Topical BID     multivitamin w/minerals  1 tablet Oral Daily     pantoprazole (PROTONIX) IV  40 mg Intravenous BID     QUEtiapine  12.5 mg Oral Once     rifaximin  550 mg Oral BID     sodium chloride (PF)  3 mL Intracatheter Q8H     traZODone  50 mg Oral At Bedtime       Data   Recent Labs   Lab 08/20/20  0935 08/19/20  2127 08/19/20  0936 08/18/20  0842  08/16/20  0130  08/14/20  0034 08/13/20  1620   WBC 8.1  --  8.4 7.5   < > 7.2   < > 9.5 13.0*   HGB 9.8*  --  10.0* 9.6*   < > 8.9*   < > 8.3* 6.6*   MCV 89  --  88 89   < > 89   < > 88 89   *  --  140* 121*   < > 169   < > 161 182   INR  --   --   --  1.61*  --   --   --  1.44* 1.45*     --  141 147*   < > 152*   < > 146* 141   POTASSIUM 3.8 4.0 3.3* 3.4   < > 4.4   < > 3.7 3.5   CHLORIDE 115*  --  113* 118*   < > 128*   < > 118* 112*   CO2 19*  --  22 24   < > 19*   < > 23 19*   BUN 9  --  10 9   < > 15   < > 19 20   CR 1.45*  --  1.46* 1.43*   < > 1.48*   < > 1.46* 1.68*   ANIONGAP 7  --  6 5   < > 5   < > 5 10   BEE 8.6  --  8.6 8.6   < > 8.3*   < > 8.2* 8.4*   GLC 84  --  106* 91   < > 80   < > 84 72   ALBUMIN 2.8*  --   2.7* 2.5*   < > 2.7*  --  2.6* 2.5*   PROTTOTAL 6.3*  --  6.4* 6.0*   < > 6.3*  --  6.3* 6.2*   BILITOTAL 1.9*  --  2.1* 1.9*   < > 1.5*  --  1.3 1.1   ALKPHOS 395*  --  407* 374*   < > 393*  --  470* 487*   ALT 21  --  21 22   < > 30  --  25 26   AST 27  --  23 26   < > 76*  --  26 32   LIPASE  --   --   --   --   --   --   --   --  135   TROPI  --   --   --  0.027  --  0.070*  --   --   --     < > = values in this interval not displayed.       No results found for this or any previous visit (from the past 24 hour(s)).

## 2020-08-20 NOTE — PLAN OF CARE
"Discharge Planner PT   Patient plan for discharge: move into an efficiency apt  Current status: PT: order received; Initial evaluation completed and treatment initiated. Patient with impaired cognition and difficulty giving PLOF; reports use of a walker; Patient admitted with confusion and falls; \"Recent alcohol treatment 7/4-8/5, began drinking immediately upon discharge\". On eval patient oriented to self and month; for year he states \"12\"; not sure of place; impulsive and attempting to stand multiple times despite reminder cues to wait; CGA for sit>stand; trial of gait with a rolling walker x 150 feet; CGA and safety cues/cues for pathfinding; transfer to recliner chair at end of session with CGA; sitter present for session and patient impulsively continues to attempt to stand.  Barriers to return to prior living situation: impaired cognition; level of assist; high falls risk; unable to care for self in current condition  Recommendations for discharge: TCU  Rationale for recommendations: Patient reports being weaker than baseline level of function which is impairing his independence with functional mobility; further limited by impaired cognition; would benefit from TCU to maximize return to PLOF; may need to transition to a more supportive living environment at discharge.       Entered by: Rosalie Murillo 08/20/2020 3:48 PM      "

## 2020-08-20 NOTE — PLAN OF CARE
"Discharge Planner OT   Patient plan for discharge: \"Get clothes and go home\"  Current status: OT orders rec'd, initial eval complete, treatment initiated. Pt is a 59 yr old male admitted 7/17 from TCU with AMS, found to have UTI. Pt did have recent hospitalization with discharge to TCU 6/25. Pt also with recent L hip replacement late May/early June of this year. PMH significant for alcohol use disorder, alcohol withdrawal seizures, EtOH cirrhosis, COPD, CKD III, AML (diagnosed 2008, s/p chemotherapy, complete remission >10 years). Per chart, pt was living a nursing home prior to admit. Pt unable to provide living history. Pt did report he was using a 4WW at baseline and was IND w/ADL's, does not drive.     Pt sit<>stand EOB CGA, no AD, unsteady on feet, reaches for furniture/walls for increased support w/in room ambulation.  Pt reports he does use a 4WW at baseline. Pt sit<>stand toilet CGA w/use of grab bar, CGA to stand at sink to complete 1 g/h task. Pt administered SLUMS exam to assess for safety w/I/ADL's. Pt score a 9/30, indicating dementia like impairments. Scores warrent further assessment. Session ended w/pt sitting EOB w/sitter in room.   Barriers to return to prior living situation: none to nursing home  Recommendations for discharge: TCU  Rationale for recommendations: Pt will benefit from continued skilled therapy to increase safety and [place] w/ADL's. If pt is able to return to nursing home, anticipate pt would be safe to do so when medically ready w/continued therapy  in that setting.        Entered by: Tati Pendleton 08/20/2020 12:11 PM       "

## 2020-08-21 ENCOUNTER — APPOINTMENT (OUTPATIENT)
Dept: PHYSICAL THERAPY | Facility: CLINIC | Age: 60
End: 2020-08-21
Attending: INTERNAL MEDICINE
Payer: COMMERCIAL

## 2020-08-21 LAB
AMMONIA PLAS-SCNC: 44 UMOL/L (ref 10–50)
ANION GAP SERPL CALCULATED.3IONS-SCNC: 8 MMOL/L (ref 3–14)
BUN SERPL-MCNC: 11 MG/DL (ref 7–30)
CALCIUM SERPL-MCNC: 9.1 MG/DL (ref 8.5–10.1)
CHLORIDE SERPL-SCNC: 113 MMOL/L (ref 94–109)
CO2 SERPL-SCNC: 19 MMOL/L (ref 20–32)
CREAT SERPL-MCNC: 1.61 MG/DL (ref 0.66–1.25)
GFR SERPL CREATININE-BSD FRML MDRD: 45 ML/MIN/{1.73_M2}
GLUCOSE BLDC GLUCOMTR-MCNC: 77 MG/DL (ref 70–99)
GLUCOSE BLDC GLUCOMTR-MCNC: 93 MG/DL (ref 70–99)
GLUCOSE SERPL-MCNC: 96 MG/DL (ref 70–99)
POTASSIUM SERPL-SCNC: 3.8 MMOL/L (ref 3.4–5.3)
SODIUM SERPL-SCNC: 140 MMOL/L (ref 133–144)

## 2020-08-21 PROCEDURE — 82140 ASSAY OF AMMONIA: CPT | Performed by: INTERNAL MEDICINE

## 2020-08-21 PROCEDURE — 25000132 ZZH RX MED GY IP 250 OP 250 PS 637: Performed by: HOSPITALIST

## 2020-08-21 PROCEDURE — 25000132 ZZH RX MED GY IP 250 OP 250 PS 637: Performed by: PHYSICIAN ASSISTANT

## 2020-08-21 PROCEDURE — 25000132 ZZH RX MED GY IP 250 OP 250 PS 637: Performed by: INTERNAL MEDICINE

## 2020-08-21 PROCEDURE — 12000000 ZZH R&B MED SURG/OB

## 2020-08-21 PROCEDURE — 97110 THERAPEUTIC EXERCISES: CPT | Mod: GP

## 2020-08-21 PROCEDURE — 25000128 H RX IP 250 OP 636: Performed by: INTERNAL MEDICINE

## 2020-08-21 PROCEDURE — 99232 SBSQ HOSP IP/OBS MODERATE 35: CPT | Performed by: INTERNAL MEDICINE

## 2020-08-21 PROCEDURE — 36415 COLL VENOUS BLD VENIPUNCTURE: CPT | Performed by: INTERNAL MEDICINE

## 2020-08-21 PROCEDURE — C9113 INJ PANTOPRAZOLE SODIUM, VIA: HCPCS | Performed by: INTERNAL MEDICINE

## 2020-08-21 PROCEDURE — 25800030 ZZH RX IP 258 OP 636: Performed by: INTERNAL MEDICINE

## 2020-08-21 PROCEDURE — 97116 GAIT TRAINING THERAPY: CPT | Mod: GP

## 2020-08-21 PROCEDURE — 00000146 ZZHCL STATISTIC GLUCOSE BY METER IP

## 2020-08-21 PROCEDURE — 80048 BASIC METABOLIC PNL TOTAL CA: CPT | Performed by: INTERNAL MEDICINE

## 2020-08-21 RX ORDER — SODIUM CHLORIDE 9 MG/ML
INJECTION, SOLUTION INTRAVENOUS CONTINUOUS
Status: ACTIVE | OUTPATIENT
Start: 2020-08-21 | End: 2020-08-21

## 2020-08-21 RX ORDER — PANTOPRAZOLE SODIUM 40 MG/1
40 TABLET, DELAYED RELEASE ORAL
Status: DISCONTINUED | OUTPATIENT
Start: 2020-08-21 | End: 2020-08-29 | Stop reason: HOSPADM

## 2020-08-21 RX ADMIN — HALOPERIDOL LACTATE 4 MG: 5 INJECTION, SOLUTION INTRAMUSCULAR at 14:52

## 2020-08-21 RX ADMIN — MICONAZOLE NITRATE: 20 POWDER TOPICAL at 09:46

## 2020-08-21 RX ADMIN — FOLIC ACID 1 MG: 1 TABLET ORAL at 09:45

## 2020-08-21 RX ADMIN — RIFAXIMIN 550 MG: 550 TABLET ORAL at 19:19

## 2020-08-21 RX ADMIN — PANTOPRAZOLE SODIUM 40 MG: 40 TABLET, DELAYED RELEASE ORAL at 17:42

## 2020-08-21 RX ADMIN — PANTOPRAZOLE SODIUM 40 MG: 40 INJECTION, POWDER, FOR SOLUTION INTRAVENOUS at 09:45

## 2020-08-21 RX ADMIN — QUETIAPINE 12.5 MG: 25 TABLET, FILM COATED ORAL at 19:19

## 2020-08-21 RX ADMIN — RIFAXIMIN 550 MG: 550 TABLET ORAL at 09:45

## 2020-08-21 RX ADMIN — LACTULOSE 20 G: 10 SOLUTION ORAL at 09:45

## 2020-08-21 RX ADMIN — MULTIPLE VITAMINS W/ MINERALS TAB 1 TABLET: TAB at 09:45

## 2020-08-21 RX ADMIN — FLUTICASONE FUROATE AND VILANTEROL TRIFENATATE 1 PUFF: 200; 25 POWDER RESPIRATORY (INHALATION) at 09:46

## 2020-08-21 RX ADMIN — SODIUM CHLORIDE: 9 INJECTION, SOLUTION INTRAVENOUS at 14:56

## 2020-08-21 ASSESSMENT — ACTIVITIES OF DAILY LIVING (ADL)
ADLS_ACUITY_SCORE: 22
ADLS_ACUITY_SCORE: 22
ADLS_ACUITY_SCORE: 21
ADLS_ACUITY_SCORE: 21
ADLS_ACUITY_SCORE: 22
ADLS_ACUITY_SCORE: 22

## 2020-08-21 NOTE — PLAN OF CARE
DATE & TIME: 8/21/2020 1900-0730   Cognitive Concerns/ Orientation : Pt alert x1-2. Confused.   BEHAVIOR & AGGRESSION TOOL COLOR: Green/Yellow, Has been cooperative this shift  CIWA SCORE: Discontinued   ABNL VS/O2: VSS on RA  MOBILITY: Ax1, GB/W  PAIN MANAGMENT: Denies  DIET: DD1 with thin liquids, no straws. Takes pills crushed with apple sauce. Has been refusing but will take if given time between bites  BOWEL/BLADDER: Incontinent of bowel and bladder at times. 3 loose stools, on lactulose  ABNL LAB/BG: K 3.8/BG ACHS 84, 107  DRAIN/DEVICES: IV SL  TELEMETRY RHYTHM: discontinued  SKIN: Bruised, scabbed. Redness to groin, antifungal powder. Skin tear on RUE, Mepilex CDI.  D/C DAY/GOALS/PLACE: Discharge to TCU pending progress  OTHER IMPORTANT INFO: Pt impulsive, confused.

## 2020-08-21 NOTE — PLAN OF CARE
Cognitive Concerns/ Orientation : Pt alert x1-2. Confused.   BEHAVIOR & AGGRESSION TOOL COLOR: Green/Yellow, Has been cooperative this shift  CIWA SCORE: Discontinued   ABNL VS/O2: VSS on RA  MOBILITY: Ax1, GB/W  PAIN MANAGMENT: Denies    DIET: DD1 with thin liquids, no straws. Takes pills crushed with apple sauce. Much improved intake today.    BOWEL/BLADDER: on lactulose--urgency    ABNL LAB/BG: K 3.8/BG 77, 96 today.  Ammonia 44 this AM.    DRAIN/DEVICES: RAC PIV SL    TELEMETRY RHYTHM: discontinued (SR)    SKIN: Bruised, scabbed. Redness to groin, antifungal powder. Skin tear on RUE, Mepilex CDI.    D/C DAY/GOALS/PLACE: Discharge to TCU pending progress    OTHER IMPORTANT INFO:   --Pt impulsive, confused.  --impulsivity much improved today.  Patiently resting in chair between cares.  Impulsivity usually due to bathroom needs (lactulose-induced loose BMs).

## 2020-08-21 NOTE — PROGRESS NOTES
Patient agitated at shift change/MD rounding.  Patient swearing, frustrated.  Not receiving redirection well.      Haldol given, see MAR.

## 2020-08-21 NOTE — PLAN OF CARE
Discharge Planner PT   Patient plan for discharge: move into an efficiency apt  Current status: Pt transferred STSx3 this session from chair with CGA progressing to SBA. Gait training 220' with FWW and CGA. Following gait pt left sitting in chair, left with alarm on and needs in reach.   Barriers to return to prior living situation: impaired cognition; level of assist; high falls risk; unable to care for self in current condition  Recommendations for discharge: TCU per plan established by the PT.  Rationale for recommendations: Patient reports being weaker than baseline level of function which is impairing his independence with functional mobility; further limited by impaired cognition; would benefit from TCU to maximize return to PLOF; may need to transition to a more supportive living environment at discharge.       Entered by: Yessy Shine 08/21/2020 11:53 AM

## 2020-08-21 NOTE — PROGRESS NOTES
[facility]    Hospitalist Progress Note    Date of Service (when I saw the patient): 08/21/2020    Assessment & Plan   Abhijeet Mccauley is a 59 year old male who presents with fall.    Falls  Encephalopathy, likely metabolic and alcohol withdrawal and possible hepatic  Hospitalized 7/2020 for UTI. With increasing confusion and falls. Recent alcohol treatment 7/4-8/5, began drinking immediately upon discharge. Confused on presentation. With at least 2 falls. 4-5 beers (at least) daily.  On presentation confusion and lethargy. Lactic acid elevated at 2.6. ammonia 54. WBC 13. UA with large blood and large leuest. Etoh 0.03 on admission. CT abdomen with cirrhosis with varices, small volume ascites, R 7th and 10th rib fractures. CT head/ c-spine negative for acute fracture. he is afebrile and with no abdominal pain, making SBP unlikely.  He has improved but still a bit confused and impulsive, sitter in room.  - 8/13 blood cultures no growth  - urine culture negative  - ceftriaxone 1g IV q24 hours started 8/13, with a negative urine and little evidence of SBP antibiotics were stopped on 8/18.  Lactate levels are baseline  - HE management as below  -His encephalopathy has significantly improved, patient still has occasional confusion and impulsivity. will request a PT/OT to evaluate the patient, speech is already recommending TCU, social work consulted. Still having some behavior issues  -stop Trazodone at hs and try seroquel  - if still having issues consider Neuro consult for other recommendations .    Alcoholic cirrhosis  Concern for hepatic encephalopathy  Longstanding history of alcohol use disorder with resultant cirrhosis. LFT's ok except alk phos (see below). No abdominal pain. Ammonia on admission at 54, improved 8/14 to 29. Total bili rising, 1.7 on 8/17  - lactulose 20 gm BID   - rifaximin 550 mg BID   -Prior to admission diuretics are on hold, he does received a dose on 8/18 of Lasix, with  good effect.  Can cautiously restarted depending on his fluid status and blood pressures prior to discharge. Of note, prior Cr 1.9-2 in 3/2019.  Actually better this admission and wonder if over diuresing prior.    Anemia  Known history of varices with banding. Unable to obtain history on admission.  ? Reported hemoccult positive. Hemoglobin on admission is 6.6.  INR on admission is 1.45.    - transfused x 1 unit in ED, appreciate gastroenterology input, continue Protonix 40 mg IVtwice daily, his hemoglobin is stable, he was started on lactulose and plan to continue with the rifaximin.  -Hemoglobin is stable at 10    Alcohol use disorder  H/o Etoh withdrawal seizures  Longstanding h/o Etoh use/ abuse. Multiple recent admissions for Etoh related issues. H/o Etoh w/d seizures.   -Patient was on CIWA protocol, currently no signs of withdrawal now, continue multivitamin, Per PT and OT is requested to evaluate patient, currently speech is suggesting TCU.    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 Lino  Baseline creatinine appears to be in the 1.4-1.9 range as of recent, 1.68 on admission. Patient with CT on admission showing ureteric stent present.  - avoid nephrotoxins  - creatinine stable at 1.46 on 8/19  - see above    Hypernatremia  Hypokalemia  Replace electrolytes as needed, will stop the D5W drip as hypernatremia resolved.  BMP in a.m.    Respiratory distress 8/16  Type II NSTEMI  Early am 8/16 with respiratory distress. Audible expiratory wheezes appreciated. Otherwise lung sounds clear. O2 sats 97% on 2L. Vitals otherwise stable. CXR clear, BNP elevated 5477. Troponin detectable at 0.070. VBG 7.35/36/30  -Patient did receive a dose of IV Lasix with significant diuresis, currently creatinine is stable at 1.46.  - repeat troponin am 8/18 0.027  - recent echo 6/2020 with EF 55-60, mild concentric LV hypertrophy. Will monitor for repeat  need  - suspect underlying COPD, less likely acute HFpEF.  Chest x-ray is clear    Hypoglycemia  Noted overnight 8/15 with BS to 62, 59. Started on D5W. Hypoglycemia 2/2 NPO status with underlying liver disease and impaired gluconeogenesis.   - d50 as needed  -Hypernatremia has resolved, will stop D5W.  Diet also advanced to dysphagia level 2 for him today.    Elevated alk phos  Seen by GI inpatient 6/23. Suspected Alk phos elevation multifactorial, related to cirrhosis, recent fractures. At that time further workup  (EUS with liver biopsy) unlikely to  with ongoing active Etoh history and lack of follow up  - monitor for now    Right 10th posterior rib fracture  Patient with CT on admission showing comminuted segmental fracture of right seventh rib with bilateral rib fractures otherwise stable, as well as right posterior rib fracture with increased displacement.  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  DVT Prophylaxis: Pneumatic Compression Devices  Code Status: Full Code    Disposition: Expected discharge possibly 8/20 TCU    Norm Vale MD      Interval History   Patient doing better with appetite, a little less impulsive.  3 stools yesterday and 2 so far today.    -Data reviewed today: I reviewed all new labs and imaging results over the last 24 hours. I personally reviewed no images or EKG's today.    Physical Exam   Temp: 98.7  F (37.1  C) Temp src: Oral BP: 109/61 Pulse: 93   Resp: 18 SpO2: 97 % O2 Device: None (Room air)    Vitals:    08/15/20 0042 08/19/20 0430 08/21/20 0500   Weight: 78.6 kg (173 lb 4.5 oz) 74.9 kg (165 lb 2 oz) 72.2 kg (159 lb 1.6 oz)     Vital Signs with  Ranges  Temp:  [97.9  F (36.6  C)-98.7  F (37.1  C)] 98.7  F (37.1  C)  Pulse:  [92-94] 93  Resp:  [18] 18  BP: (109-155)/(60-86) 109/61  SpO2:  [97 %-100 %] 97 %  No intake/output data recorded.    Constitutional: Awake, alert, oriented.  Aug. 13, 20.  Knows at [facility]?  Thinks on 3rd floor.    Respiratory: Clear to auscultation bilaterally, no crackles or wheezing  Cardiovascular: Regular rate and rhythm, normal S1 and S2, and no murmur noted  GI: Normal bowel sounds, soft, non-distended, non-tender  Skin/Integumen: No rashes, no cyanosis, trace lower extremity edema present  Neuro : moving all 4 extremities, no focal deficit noted no asterixis      Medications     sodium chloride         fluticasone-vilanterol  1 puff Inhalation Daily     folic acid  1 mg Oral Daily     lactulose  20 g Oral BID     miconazole   Topical BID     multivitamin w/minerals  1 tablet Oral Daily     pantoprazole (PROTONIX) IV  40 mg Intravenous BID     QUEtiapine  12.5 mg Oral Once     rifaximin  550 mg Oral BID     sodium chloride (PF)  3 mL Intracatheter Q8H     traZODone  50 mg Oral At Bedtime       Data   Recent Labs   Lab 08/21/20  0834 08/20/20  0935 08/19/20  2127 08/19/20  0936 08/18/20  0842  08/16/20  0130   WBC  --  8.1  --  8.4 7.5   < > 7.2   HGB  --  9.8*  --  10.0* 9.6*   < > 8.9*   MCV  --  89  --  88 89   < > 89   PLT  --  145*  --  140* 121*   < > 169   INR  --   --   --   --  1.61*  --   --     141  --  141 147*   < > 152*   POTASSIUM 3.8 3.8 4.0 3.3* 3.4   < > 4.4   CHLORIDE 113* 115*  --  113* 118*   < > 128*   CO2 19* 19*  --  22 24   < > 19*   BUN 11 9  --  10 9   < > 15   CR 1.61* 1.45*  --  1.46* 1.43*   < > 1.48*   ANIONGAP 8 7  --  6 5   < > 5   BEE 9.1 8.6  --  8.6 8.6   < > 8.3*   GLC 96 84  --  106* 91   < > 80   ALBUMIN  --  2.8*  --  2.7* 2.5*   < > 2.7*   PROTTOTAL  --  6.3*  --  6.4* 6.0*   < > 6.3*   BILITOTAL  --  1.9*  --  2.1* 1.9*   < > 1.5*   ALKPHOS  --  395*  --  407* 374*   < >  393*   ALT  --  21  --  21 22   < > 30   AST  --  27  --  23 26   < > 76*   TROPI  --   --   --   --  0.027  --  0.070*    < > = values in this interval not displayed.       No results found for this or any previous visit (from the past 24 hour(s)).

## 2020-08-22 ENCOUNTER — APPOINTMENT (OUTPATIENT)
Dept: SPEECH THERAPY | Facility: CLINIC | Age: 60
End: 2020-08-22
Attending: INTERNAL MEDICINE
Payer: COMMERCIAL

## 2020-08-22 LAB
ANION GAP SERPL CALCULATED.3IONS-SCNC: 6 MMOL/L (ref 3–14)
BUN SERPL-MCNC: 11 MG/DL (ref 7–30)
CALCIUM SERPL-MCNC: 8.9 MG/DL (ref 8.5–10.1)
CHLORIDE SERPL-SCNC: 112 MMOL/L (ref 94–109)
CO2 SERPL-SCNC: 22 MMOL/L (ref 20–32)
CREAT SERPL-MCNC: 1.7 MG/DL (ref 0.66–1.25)
GFR SERPL CREATININE-BSD FRML MDRD: 42 ML/MIN/{1.73_M2}
GLUCOSE BLDC GLUCOMTR-MCNC: 76 MG/DL (ref 70–99)
GLUCOSE SERPL-MCNC: 90 MG/DL (ref 70–99)
POTASSIUM SERPL-SCNC: 3.9 MMOL/L (ref 3.4–5.3)
SODIUM SERPL-SCNC: 140 MMOL/L (ref 133–144)

## 2020-08-22 PROCEDURE — 25000132 ZZH RX MED GY IP 250 OP 250 PS 637: Performed by: INTERNAL MEDICINE

## 2020-08-22 PROCEDURE — 92526 ORAL FUNCTION THERAPY: CPT | Mod: GN | Performed by: SPEECH-LANGUAGE PATHOLOGIST

## 2020-08-22 PROCEDURE — 25000132 ZZH RX MED GY IP 250 OP 250 PS 637: Performed by: HOSPITALIST

## 2020-08-22 PROCEDURE — 25000128 H RX IP 250 OP 636: Performed by: INTERNAL MEDICINE

## 2020-08-22 PROCEDURE — 36415 COLL VENOUS BLD VENIPUNCTURE: CPT | Performed by: INTERNAL MEDICINE

## 2020-08-22 PROCEDURE — 25000132 ZZH RX MED GY IP 250 OP 250 PS 637: Performed by: PHYSICIAN ASSISTANT

## 2020-08-22 PROCEDURE — 99232 SBSQ HOSP IP/OBS MODERATE 35: CPT | Performed by: INTERNAL MEDICINE

## 2020-08-22 PROCEDURE — 00000146 ZZHCL STATISTIC GLUCOSE BY METER IP

## 2020-08-22 PROCEDURE — 80048 BASIC METABOLIC PNL TOTAL CA: CPT | Performed by: INTERNAL MEDICINE

## 2020-08-22 PROCEDURE — 12000000 ZZH R&B MED SURG/OB

## 2020-08-22 RX ADMIN — PANTOPRAZOLE SODIUM 40 MG: 40 TABLET, DELAYED RELEASE ORAL at 15:36

## 2020-08-22 RX ADMIN — QUETIAPINE 12.5 MG: 25 TABLET, FILM COATED ORAL at 19:43

## 2020-08-22 RX ADMIN — Medication 1 MG: at 19:43

## 2020-08-22 RX ADMIN — MICONAZOLE NITRATE: 20 POWDER TOPICAL at 08:17

## 2020-08-22 RX ADMIN — RIFAXIMIN 550 MG: 550 TABLET ORAL at 19:43

## 2020-08-22 RX ADMIN — LACTULOSE 20 G: 10 SOLUTION ORAL at 08:14

## 2020-08-22 RX ADMIN — ACETAMINOPHEN 650 MG: 325 TABLET, FILM COATED ORAL at 18:20

## 2020-08-22 RX ADMIN — RIFAXIMIN 550 MG: 550 TABLET ORAL at 08:14

## 2020-08-22 RX ADMIN — MULTIPLE VITAMINS W/ MINERALS TAB 1 TABLET: TAB at 08:14

## 2020-08-22 RX ADMIN — FOLIC ACID 1 MG: 1 TABLET ORAL at 08:14

## 2020-08-22 RX ADMIN — HALOPERIDOL LACTATE 4 MG: 5 INJECTION, SOLUTION INTRAMUSCULAR at 15:36

## 2020-08-22 RX ADMIN — LACTULOSE 20 G: 10 SOLUTION ORAL at 19:45

## 2020-08-22 RX ADMIN — PANTOPRAZOLE SODIUM 40 MG: 40 TABLET, DELAYED RELEASE ORAL at 07:03

## 2020-08-22 RX ADMIN — FLUTICASONE FUROATE AND VILANTEROL TRIFENATATE 1 PUFF: 200; 25 POWDER RESPIRATORY (INHALATION) at 08:20

## 2020-08-22 ASSESSMENT — ACTIVITIES OF DAILY LIVING (ADL)
ADLS_ACUITY_SCORE: 19
ADLS_ACUITY_SCORE: 22
ADLS_ACUITY_SCORE: 21
ADLS_ACUITY_SCORE: 22
ADLS_ACUITY_SCORE: 21
ADLS_ACUITY_SCORE: 22

## 2020-08-22 NOTE — PLAN OF CARE
Discharge Planner SLP   Patient plan for discharge: He wants to go home.   Current status: Patient seen for swallow treatment while up in the chair. He is able to carry on a conversation today. He was given trials of solids and thin liquids. Patient was able to tolerate thin liquids with premature entry and no overt Sx aspiration alone, mild throat clearing with mixed textures.Prolonged mastication of a solid due to missing molars, had good bolus clearing and extraction. Had one delayed cough after the solid and suspect some pharyngeal retention.   Improving, but not yet ready for a diet advancement.   Recommend: 1. Continue on the mechanical dental soft diet and thin liquids. Up in a chair, no straws, small bites/sips.   Barriers to return to prior living situation: Cognition/safety  Recommendations for discharge: TCU  Rationale for recommendations: Anticipate swallow goals maybe met at time of discharge. If not short term follow up for diet advancement and tolerance. Patient nearing his baseline.        Entered by: Ying De La Torre 08/22/2020 10:20 AM

## 2020-08-22 NOTE — PROGRESS NOTES
[facility]    Hospitalist Progress Note    Date of Service (when I saw the patient): 08/22/2020    Assessment & Plan   Abhijeet Mccauley is a 59 year old male who presents with fall.    Falls  Encephalopathy, likely metabolic and alcohol withdrawal and possible hepatic  Hospitalized 7/2020 for UTI. With increasing confusion and falls. Recent alcohol treatment 7/4-8/5, began drinking immediately upon discharge. Confused on presentation. With at least 2 falls. 4-5 beers (at least) daily.  On presentation confusion and lethargy. Lactic acid elevated at 2.6. ammonia 54. WBC 13. UA with large blood and large leuest. Etoh 0.03 on admission. CT abdomen with cirrhosis with varices, small volume ascites, R 7th and 10th rib fractures. CT head/ c-spine negative for acute fracture. he is afebrile and with no abdominal pain, making SBP unlikely.  He has improved but still a bit confused and impulsive, sitter in room.  - 8/13 blood cultures no growth  - urine culture negative  - ceftriaxone 1g IV q24 hours started 8/13, with a negative urine and little evidence of SBP antibiotics were stopped on 8/18.  Lactate levels are baseline  -HE management as below  -encephalopathy improved, therapies (PT/SLP) recommending TCU  -seroquel 12.5 mg at HS  -will have OT do cognitive evaluation    Alcoholic cirrhosis  Concern for hepatic encephalopathy  PTA furosemide 20 mg daily, lactulose 20 gm TID, rifaximin 550 BID, spironolactone 25 mg daily  Longstanding history of alcohol use disorder with resultant cirrhosis. LFT's ok except alk phos (see below). No abdominal pain. Ammonia on admission at 54, improved 8/14 to 29. Total bili rising, 1.7 on 8/17  - lactulose 20 gm BID   - rifaximin 550 mg BID   -Prior to admission diuretics are on hold, he received a dose on 8/18 of Lasix, with good effect. Prior Cr 1.9-2 in 3/2019, actually better this admission and wonder if over diuresing prior.    Anemia  Known history of varices with  banding. Unable to obtain history on admission.  ? Reported hemoccult positive. Hemoglobin on admission was 6.6.  INR on admission is 1.45.    - transfused x 1 unit in ED, appreciate gastroenterology input, continue Protonix 40 mg IV twice daily  -Hemoglobin is stable at ~10 8/20    Alcohol use disorder  H/o Etoh withdrawal seizures  Longstanding h/o Etoh use/ abuse. Multiple recent admissions for Etoh related issues. H/o Etoh w/d seizures.   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 Lino  Baseline creatinine appears to be in the 1.4-1.9 range as of recent, 1.68 on admission. Patient with CT on admission showing ureteric stent present.  - avoid nephrotoxins  - creatinine up slightly 1.7 8/22  - repeat BMP in the am    Hypernatremia  Hypokalemia  Improved/ resolved    Respiratory distress 8/16  Type II NSTEMI  Early am 8/16 with respiratory distress. Audible expiratory wheezes appreciated. Otherwise lung sounds clear. O2 sats 97% on 2L. Vitals otherwise stable. CXR clear, BNP elevated 5477. Troponin detectable at 0.070. VBG 7.35/36/30  -Patient did receive a dose of IV Lasix with significant diuresis, currently creatinine is stable at 1.46.  - repeat troponin am 8/18 0.027  - recent echo 6/2020 with EF 55-60, mild concentric LV hypertrophy. Will monitor for repeat need  - suspect underlying COPD, less likely acute HFpEF.  Chest x-ray is clear    Hypoglycemia  Noted overnight 8/15 with BS to 62, 59. Started on D5W. Hypoglycemia 2/2 NPO status with underlying liver disease and impaired gluconeogenesis.   - improved, monitoring per routine    Elevated alk phos  Seen by GI inpatient 6/23. Suspected Alk phos elevation multifactorial, related to cirrhosis, recent fractures. At that time further workup  (EUS with liver biopsy) unlikely to  with ongoing  active Etoh history and lack of follow up  - monitor for now    Right 10th posterior rib fracture  Patient with CT on admission showing comminuted segmental fracture of right seventh rib with bilateral rib fractures otherwise stable, as well as right posterior rib fracture with increased displacement.  -Monitor  -prn acetaminophen    History of AML  Diagnosed 2008, s/p chemotherapy, complete remission >10 years  -Monitor    COPD  - Continue PTA inhaler Advair (Breo)  500-50 1 puff daily as able  - prn duonebs available    MAGALIE  Hold medications    FEN (fluids, electrolytes and nutrition): mechanical soft diet with thin liquids  Discussed with nursing.  DVT Prophylaxis: Pneumatic Compression Devices  Code Status: Full Code    Disposition: Expected discharge in 2-3 days, need cognitive and home safety evaluations, possible psych eval for competency    Adams Begum MD      Interval History   Overnight events reviewed. Denies cp/sob. Impulsive. Wants to go home    -Data reviewed today: I reviewed all new labs and imaging results over the last 24 hours. I personally reviewed no images or EKG's today.    Physical Exam   Temp: 97.5  F (36.4  C) Temp src: Oral BP: 125/68 Pulse: 85   Resp: 16 SpO2: 96 % O2 Device: None (Room air)    Vitals:    08/19/20 0430 08/21/20 0500 08/22/20 0500   Weight: 74.9 kg (165 lb 2 oz) 72.2 kg (159 lb 1.6 oz) 71.4 kg (157 lb 6.5 oz)     Vital Signs with Ranges  Temp:  [97.3  F (36.3  C)-98  F (36.7  C)] 97.5  F (36.4  C)  Pulse:  [85-86] 85  Resp:  [16-18] 16  BP: (119-128)/(64-68) 125/68  SpO2:  [96 %-99 %] 96 %  No intake/output data recorded.    Constitutional: Awake, alert, doesn't know where he is, knows year, month, president  Respiratory: Clear to auscultation bilaterally, no crackles or wheezing  Cardiovascular: RRR, no murmurs  GI: Normal bowel sounds, soft, non-distended, non-tender  Skin/Integumen: No rashes, no cyanosis, trace lower extremity edema present  Neuro : moving all 4  extremities      Medications       fluticasone-vilanterol  1 puff Inhalation Daily     folic acid  1 mg Oral Daily     lactulose  20 g Oral BID     miconazole   Topical BID     multivitamin w/minerals  1 tablet Oral Daily     pantoprazole  40 mg Oral BID AC     QUEtiapine  12.5 mg Oral At Bedtime     rifaximin  550 mg Oral BID     sodium chloride (PF)  3 mL Intracatheter Q8H       Data   Recent Labs   Lab 08/22/20  0836 08/21/20  0834 08/20/20  0935  08/19/20  0936 08/18/20  0842  08/16/20  0130   WBC  --   --  8.1  --  8.4 7.5   < > 7.2   HGB  --   --  9.8*  --  10.0* 9.6*   < > 8.9*   MCV  --   --  89  --  88 89   < > 89   PLT  --   --  145*  --  140* 121*   < > 169   INR  --   --   --   --   --  1.61*  --   --     140 141  --  141 147*   < > 152*   POTASSIUM 3.9 3.8 3.8   < > 3.3* 3.4   < > 4.4   CHLORIDE 112* 113* 115*  --  113* 118*   < > 128*   CO2 22 19* 19*  --  22 24   < > 19*   BUN 11 11 9  --  10 9   < > 15   CR 1.70* 1.61* 1.45*  --  1.46* 1.43*   < > 1.48*   ANIONGAP 6 8 7  --  6 5   < > 5   BEE 8.9 9.1 8.6  --  8.6 8.6   < > 8.3*   GLC 90 96 84  --  106* 91   < > 80   ALBUMIN  --   --  2.8*  --  2.7* 2.5*   < > 2.7*   PROTTOTAL  --   --  6.3*  --  6.4* 6.0*   < > 6.3*   BILITOTAL  --   --  1.9*  --  2.1* 1.9*   < > 1.5*   ALKPHOS  --   --  395*  --  407* 374*   < > 393*   ALT  --   --  21  --  21 22   < > 30   AST  --   --  27  --  23 26   < > 76*   TROPI  --   --   --   --   --  0.027  --  0.070*    < > = values in this interval not displayed.       No results found for this or any previous visit (from the past 24 hour(s)).

## 2020-08-22 NOTE — PLAN OF CARE
DATE & TIME: 8/22/20 3707-7415  Cognitive Concerns/ Orientation : Pt A&O x1-2. Calm and cooperative this shift.  BEHAVIOR & AGGRESSION TOOL COLOR: Green, can be impulsive.  CIWA SCORE: NA  ABNL VS/O2: VSS on RA  MOBILITY: SBA, GB/W  PAIN MANAGMENT: Denies  DIET: DD1 with thin liquids, no straws. Takes pills crushed with apple sauce.   BOWEL/BLADDER: on lactulose--urgency and frequency- held bedtime dose of lactulose  ABNL LAB/BG:  None new  DRAIN/DEVICES: PIV Saline locked  TELEMETRY RHYTHM: NA  SKIN: Bruised, scabbed. Redness to groin, antifungal powder scheduled. Skin tear on RUE, Mepilex CDI.  D/C DAY/GOALS/PLACE: Discharge to TCU pending progress  OTHER IMPORTANT INFO: Pt pleasant this shift; still impulsive when he needs to go to the bathroom.

## 2020-08-22 NOTE — PLAN OF CARE
DATE & TIME: 8/22/20 AM shift  Cognitive Concerns/ Orientation : Pt A&O x1-2. Able to reorient pt at times but others easily frustrated and forgetful, wants to go home.   BEHAVIOR & AGGRESSION TOOL COLOR: Yellow, impulsive.   CIWA SCORE: NA  ABNL VS/O2: VSS on RA  MOBILITY: SBA, GB/W. Bed alarm and chair alarm in place but pt learned how to turn it off.   PAIN MANAGMENT: Denies  DIET:soft diet, eating well.   BOWEL/BLADDER: on lactulose--urgency and frequency- few BMs this shift.   ABNL LAB/BG: n/a  DRAIN/DEVICES: PIV Saline locked  TELEMETRY RHYTHM: NA  SKIN: Bruised, scabbed. Redness to groin, antifungal powder scheduled. Skin tear on RUE, Mepilex CDI.  D/C DAY/GOALS/PLACE: discharge pending. Per MD might need OT consult.   OTHER IMPORTANT INFO:PRN Seroquel offered, but pt refused. IV Haldol available.

## 2020-08-22 NOTE — PLAN OF CARE
DATE & TIME: 8/21/2020 evening shift   Cognitive Concerns/ Orientation : Pt alert x1-2. Confused- impulsive- bit IV tubing in half once but then mostly calm.   BEHAVIOR & AGGRESSION TOOL COLOR: Green/Yellow, Has been cooperative this shift  CIWA SCORE: Discontinued   ABNL VS/O2: VSS on RA  MOBILITY: SBA, GB/W  PAIN MANAGMENT: Denies  DIET: DD1 with thin liquids, no straws. Takes pills crushed with apple sauce.   BOWEL/BLADDER: on lactulose--urgency and frequency- held bedtime dose of lactulose  ABNL LAB/BG:  Ammonia 44 this AM. Crt up to 1.61 bolus given per orders  DRAIN/DEVICES: RAC PIV SL after 500ml bolus finished  TELEMETRY RHYTHM: discontinued  SKIN: Bruised, scabbed. Redness to groin, antifungal powder refused this augustine. Skin tear on RUE, Mepilex CDI.  D/C DAY/GOALS/PLACE: Discharge to TCU pending progress  OTHER IMPORTANT INFO: Pt impulsive, confused. Updated brother by phone today.

## 2020-08-23 LAB
ANION GAP SERPL CALCULATED.3IONS-SCNC: 5 MMOL/L (ref 3–14)
BUN SERPL-MCNC: 13 MG/DL (ref 7–30)
CALCIUM SERPL-MCNC: 8.9 MG/DL (ref 8.5–10.1)
CHLORIDE SERPL-SCNC: 114 MMOL/L (ref 94–109)
CO2 SERPL-SCNC: 21 MMOL/L (ref 20–32)
CREAT SERPL-MCNC: 1.49 MG/DL (ref 0.66–1.25)
GFR SERPL CREATININE-BSD FRML MDRD: 50 ML/MIN/{1.73_M2}
GLUCOSE SERPL-MCNC: 90 MG/DL (ref 70–99)
HGB BLD-MCNC: 9.8 G/DL (ref 13.3–17.7)
POTASSIUM SERPL-SCNC: 3.8 MMOL/L (ref 3.4–5.3)
SODIUM SERPL-SCNC: 140 MMOL/L (ref 133–144)

## 2020-08-23 PROCEDURE — 25000132 ZZH RX MED GY IP 250 OP 250 PS 637: Performed by: INTERNAL MEDICINE

## 2020-08-23 PROCEDURE — 12000000 ZZH R&B MED SURG/OB

## 2020-08-23 PROCEDURE — 36415 COLL VENOUS BLD VENIPUNCTURE: CPT | Performed by: INTERNAL MEDICINE

## 2020-08-23 PROCEDURE — 85018 HEMOGLOBIN: CPT | Performed by: INTERNAL MEDICINE

## 2020-08-23 PROCEDURE — 99232 SBSQ HOSP IP/OBS MODERATE 35: CPT | Performed by: INTERNAL MEDICINE

## 2020-08-23 PROCEDURE — 80048 BASIC METABOLIC PNL TOTAL CA: CPT | Performed by: INTERNAL MEDICINE

## 2020-08-23 PROCEDURE — 25000132 ZZH RX MED GY IP 250 OP 250 PS 637: Performed by: HOSPITALIST

## 2020-08-23 PROCEDURE — 99223 1ST HOSP IP/OBS HIGH 75: CPT | Performed by: PSYCHIATRY & NEUROLOGY

## 2020-08-23 PROCEDURE — 25000132 ZZH RX MED GY IP 250 OP 250 PS 637: Performed by: PHYSICIAN ASSISTANT

## 2020-08-23 RX ADMIN — QUETIAPINE 12.5 MG: 25 TABLET, FILM COATED ORAL at 09:17

## 2020-08-23 RX ADMIN — ACETAMINOPHEN 325 MG: 325 TABLET, FILM COATED ORAL at 20:50

## 2020-08-23 RX ADMIN — LACTULOSE 20 G: 10 SOLUTION ORAL at 07:34

## 2020-08-23 RX ADMIN — FLUTICASONE FUROATE AND VILANTEROL TRIFENATATE 1 PUFF: 200; 25 POWDER RESPIRATORY (INHALATION) at 09:16

## 2020-08-23 RX ADMIN — MICONAZOLE NITRATE: 20 POWDER TOPICAL at 09:20

## 2020-08-23 RX ADMIN — MULTIPLE VITAMINS W/ MINERALS TAB 1 TABLET: TAB at 07:34

## 2020-08-23 RX ADMIN — ACETAMINOPHEN 650 MG: 325 TABLET, FILM COATED ORAL at 16:24

## 2020-08-23 RX ADMIN — PANTOPRAZOLE SODIUM 40 MG: 40 TABLET, DELAYED RELEASE ORAL at 16:25

## 2020-08-23 RX ADMIN — RIFAXIMIN 550 MG: 550 TABLET ORAL at 07:34

## 2020-08-23 RX ADMIN — RIFAXIMIN 550 MG: 550 TABLET ORAL at 20:50

## 2020-08-23 RX ADMIN — LACTULOSE 20 G: 10 SOLUTION ORAL at 20:50

## 2020-08-23 RX ADMIN — FOLIC ACID 1 MG: 1 TABLET ORAL at 07:34

## 2020-08-23 RX ADMIN — PANTOPRAZOLE SODIUM 40 MG: 40 TABLET, DELAYED RELEASE ORAL at 06:22

## 2020-08-23 RX ADMIN — QUETIAPINE 12.5 MG: 25 TABLET, FILM COATED ORAL at 20:50

## 2020-08-23 ASSESSMENT — ACTIVITIES OF DAILY LIVING (ADL)
ADLS_ACUITY_SCORE: 21
ADLS_ACUITY_SCORE: 17
ADLS_ACUITY_SCORE: 21

## 2020-08-23 NOTE — PLAN OF CARE
DATE & TIME: 8/23/20 AM shift  Cognitive Concerns/ Orientation : Pt A&O x1-2. Able to reorient pt at times but others easily frustrated and forgetful, wants to go home.   BEHAVIOR & AGGRESSION TOOL COLOR: Green, redirectable this shift. Still impulsive at times, but better today. Seroquel PRN x 1 given this morning.   CIWA SCORE: NA  ABNL VS/O2: VSS on RA  MOBILITY: SBA, GB/W. Bed alarm and chair alarm in place but pt learned how to turn it off.   PAIN MANAGMENT: Denies  DIET:soft diet, eating well.   BOWEL/BLADDER: on lactulose--urgency and frequency- few BMs this shift.   ABNL LAB/BG: n/a  DRAIN/DEVICES: PIV Saline locked  TELEMETRY RHYTHM: NA  SKIN: Bruised, scabbed.   D/C DAY/GOALS/PLACE: discharge pending. Psych evaluation pending. PT/OT/Speech/SW are following  OTHER IMPORTANT INFO: IV Haldol available.

## 2020-08-23 NOTE — PLAN OF CARE
DATE & TIME: 8/22/20 3-11pmm shift  Cognitive Concerns/ Orientation : Pt A&O x1-2. Angry after MD told him he wasn't discharging today- constantly trying to leave so IV haldol given which eventually helped pt calm down and drop his fixation on leaving. -able to call to go to bathroom at times.  BEHAVIOR & AGGRESSION TOOL COLOR: Yellow, impulsive.   CIWA SCORE: NA  ABNL VS/O2: VSS on RA  MOBILITY: SBA, GB/W. Bed alarm and chair alarm in place but pt learned how to turn it off- reduced aid assignment may not be needed overnight.   PAIN MANAGMENT: Denies  DIET:soft diet, eating well.  BOWEL/BLADDER: on lactulose--urgency and frequency- few BMs this shift.   ABNL LAB/BG: crt continues to elevate- 1.7  DRAIN/DEVICES: PIV Saline locked  TELEMETRY RHYTHM: NA  SKIN: Bruised, scabbed. Redness to groin, antifungal powder refused by pt. Skin tear on RUE, Mepilex CDI.  D/C DAY/GOALS/PLACE: discharge pending. Per MD might need OT consult. Noted no SW consult has been completed although ordered on 19th- passed on for RN in AM to follow up with plan from SW.  OTHER IMPORTANT INFO:PRN Seroquel offered, multiple times but pt refused IV Haldol was then used.

## 2020-08-23 NOTE — PLAN OF CARE
DATE & TIME: 8/23/20 1709-0867  Cognitive Concerns/ Orientation : Pt A&O x1-2. Calm and cooperative this shift.  BEHAVIOR & AGGRESSION TOOL COLOR: Yellow, impulsive.   CIWA SCORE: NA  ABNL VS/O2: VSS on RA  MOBILITY: SBA, GB/W.   PAIN MANAGMENT: Denies  DIET: Mechanical/dental soft, no straws  BOWEL/BLADDER: On lactulose--urgency and frequency- 1 BM this shift. Incontinence at times.  ABNL LAB/BG: None new  DRAIN/DEVICES: PIV Saline locked  TELEMETRY RHYTHM: NA  SKIN: Bruised, scabbed. Redness to groin, antifungal powder scheduled, pt refusing. Skin tear on RUE, Mepilex CDI.  D/C DAY/GOALS/PLACE: Discharge pending cognitive and home safety evals;  possible psych eval for competency per MD note.   OTHER IMPORTANT INFO:PRN Seroquel and PRN IV Haldol available.  Addendum: Pt angry this morning, wants to go home.  CNA sat with him to calm him down.

## 2020-08-23 NOTE — CONSULTS
Pt seen for initial psychiatric evaluation, please see my dictation for details and recommendations. Patient is intermittently confused and cannot give a coherent account of how he got to be in the hospital or respond correctly to orientation questions. His cognition remains impaired albeit better than reported on admission. Consequently, he does not posses capacity to make decisions at this time even though he is requesting to go to treatment in [place], [place].     Alphonso Renae MD

## 2020-08-23 NOTE — PROGRESS NOTES
[facility]    Hospitalist Progress Note    Date of Service (when I saw the patient): 08/23/2020    Assessment & Plan   Abhijeet Mccauley is a 59 year old male who presents with fall.    Falls  Encephalopathy, likely metabolic and alcohol withdrawal and possible hepatic  Hospitalized 7/2020 for UTI. With increasing confusion and falls. Recent alcohol treatment 7/4-8/5, began drinking immediately upon discharge. Confused on presentation. With at least 2 falls. 4-5 beers (at least) daily.  On presentation confusion and lethargy. Lactic acid elevated at 2.6. ammonia 54. WBC 13. UA with large blood and large leuest. Etoh 0.03 on admission. CT abdomen with cirrhosis with varices, small volume ascites, R 7th and 10th rib fractures. CT head/ c-spine negative for acute fracture. he is afebrile and with no abdominal pain, making SBP unlikely.  He has improved but still a bit confused and impulsive, sitter in room.  - 8/13 blood cultures no growth  - urine culture negative  - ceftriaxone 1g IV q24 hours started 8/13, with a negative urine and little evidence of SBP antibiotics were stopped on 8/18.  Lactate levels are baseline  -HE management as below  -encephalopathy improved, therapies (PT/SLP) recommending TCU  -seroquel 12.5 mg at HS  -slums on 8/20 9/30. If he leaves the hospital and doesn't go to safe environment he there is a very high likelihood he will immediately start drinking again.  - psychiatry consult for decision making capacity and ? Commitment for ongoing alcohol abuse in setting of cirrhosis and recurrent withdrawals. After his recent discharge from TCU he immediately began drinking again    Alcoholic cirrhosis  Concern for hepatic encephalopathy  PTA furosemide 20 mg daily, lactulose 20 gm TID, rifaximin 550 BID, spironolactone 25 mg daily  Longstanding history of alcohol use disorder with resultant cirrhosis. LFT's ok except alk phos (see below). No abdominal pain. Ammonia on admission at  54, improved 8/14 to 29. Total bili rising, 1.7 on 8/17  - lactulose 20 gm BID   - rifaximin 550 mg BID   -Prior to admission diuretics are on hold, he received a dose on 8/18 of Lasix, with good effect. Prior Cr 1.9-2 in 3/2019, actually better this admission and wonder if over diuresing prior.    Anemia  Known history of varices with banding. Unable to obtain history on admission.  ? Reported hemoccult positive. Hemoglobin on admission was 6.6.  INR on admission is 1.45.    - transfused x 1 unit in ED, appreciate gastroenterology input, continue Protonix 40 mg IV twice daily  - pantoprazole 40 mg BID  -Hemoglobin is stable at ~10 8/23    Alcohol use disorder  H/o Etoh withdrawal seizures  Longstanding h/o Etoh use/ abuse. Multiple recent admissions for Etoh related issues. H/o Etoh w/d seizures.   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 Lino  Baseline creatinine appears to be in the 1.4-1.9 range as of recent, 1.68 on admission. Patient with CT on admission showing ureteric stent present.  - avoid nephrotoxins  - creatinine up slightly 1.7 8/22  - repeat BMP in the am    Hypernatremia  Hypokalemia  Improved/ resolved    Respiratory distress 8/16  Type II NSTEMI  Early am 8/16 with respiratory distress. Audible expiratory wheezes appreciated. Otherwise lung sounds clear. O2 sats 97% on 2L. Vitals otherwise stable. CXR clear, BNP elevated 5477. Troponin detectable at 0.070. VBG 7.35/36/30  -Patient did receive a dose of IV Lasix with significant diuresis, currently creatinine is stable at 1.46.  - repeat troponin am 8/18 0.027  - recent echo 6/2020 with EF 55-60, mild concentric LV hypertrophy. Will monitor  for repeat need  - suspect underlying COPD, less likely acute HFpEF.  Chest x-ray was clear    Hypoglycemia  Resolved as PO intake improved    Elevated alk phos  Seen by GI inpatient 6/23. Suspected Alk phos elevation multifactorial, related to cirrhosis, recent fractures. At that time further workup  (EUS with liver biopsy) unlikely to  with ongoing active Etoh history and lack of follow up  - monitor for now    Right 10th posterior rib fracture  Patient with CT on admission showing comminuted segmental fracture of right seventh rib with bilateral rib fractures otherwise stable, as well as right posterior rib fracture with increased displacement.  -Monitor  -prn acetaminophen    History of AML  Diagnosed 2008, s/p chemotherapy, complete remission >10 years  -Monitor    COPD  - Continue PTA inhaler Advair (Breo)  500-50 1 puff daily   - prn duonebs available    MAGALIE  Hold medications    FEN (fluids, electrolytes and nutrition): mechanical soft diet with thin liquids  Discussed with nursing.  DVT Prophylaxis: Pneumatic Compression Devices  Code Status: Full Code    Disposition: Expected discharge in 1-2 days pending psychiatry evaluation    Adams Begum M.D.  Hospitalist  Pager [phone]  Text Page      Interval History   Overnight events reviewed. Denies cp/sob. Remains impulsive. Angry and he wants to go home    -Data reviewed today: I reviewed all new labs and imaging results over the last 24 hours. I personally reviewed no images or EKG's today.    Physical Exam   Temp: 98.7  F (37.1  C) Temp src: Oral BP: 129/72 Pulse: 79   Resp: 16 SpO2: 97 % O2 Device: None (Room air)    Vitals:    08/21/20 0500 08/22/20 0500 08/23/20 0526   Weight: 72.2 kg (159 lb 1.6 oz) 71.4 kg (157 lb 6.5 oz) 72.1 kg (158 lb 15.2 oz)     Vital Signs with Ranges  Temp:  [97.5  F (36.4  C)-98.7  F (37.1  C)] 98.7  F (37.1  C)  Pulse:  [74-83] 79  Resp:  [16-18] 16  BP: (128-141)/(63-72) 129/72  SpO2:  [96 %-100 %] 97  %  I/O last 3 completed shifts:  In: 240 [P.O.:240]  Out: -     Constitutional: Awake, alert, doesn't know where he is, knows year, month, president  Respiratory: Clear to auscultation bilaterally, no crackles or wheezing  Cardiovascular: RRR, no murmurs  GI: Normal bowel sounds, soft, non-distended, non-tender  Skin/Integumen: No rashes, no cyanosis, no LE edema  Neuro : moving all 4 extremities      Medications       fluticasone-vilanterol  1 puff Inhalation Daily     folic acid  1 mg Oral Daily     lactulose  20 g Oral BID     miconazole   Topical BID     multivitamin w/minerals  1 tablet Oral Daily     pantoprazole  40 mg Oral BID AC     QUEtiapine  12.5 mg Oral At Bedtime     rifaximin  550 mg Oral BID     sodium chloride (PF)  3 mL Intracatheter Q8H       Data   Recent Labs   Lab 08/23/20  0800 08/22/20  0836 08/21/20  0834 08/20/20  0935  08/19/20  0936 08/18/20  0842   WBC  --   --   --  8.1  --  8.4 7.5   HGB 9.8*  --   --  9.8*  --  10.0* 9.6*   MCV  --   --   --  89  --  88 89   PLT  --   --   --  145*  --  140* 121*   INR  --   --   --   --   --   --  1.61*    140 140 141  --  141 147*   POTASSIUM 3.8 3.9 3.8 3.8   < > 3.3* 3.4   CHLORIDE 114* 112* 113* 115*  --  113* 118*   CO2 21 22 19* 19*  --  22 24   BUN 13 11 11 9  --  10 9   CR 1.49* 1.70* 1.61* 1.45*  --  1.46* 1.43*   ANIONGAP 5 6 8 7  --  6 5   BEE 8.9 8.9 9.1 8.6  --  8.6 8.6   GLC 90 90 96 84  --  106* 91   ALBUMIN  --   --   --  2.8*  --  2.7* 2.5*   PROTTOTAL  --   --   --  6.3*  --  6.4* 6.0*   BILITOTAL  --   --   --  1.9*  --  2.1* 1.9*   ALKPHOS  --   --   --  395*  --  407* 374*   ALT  --   --   --  21  --  21 22   AST  --   --   --  27  --  23 26   TROPI  --   --   --   --   --   --  0.027    < > = values in this interval not displayed.       No results found for this or any previous visit (from the past 24 hour(s)).

## 2020-08-24 ENCOUNTER — APPOINTMENT (OUTPATIENT)
Dept: OCCUPATIONAL THERAPY | Facility: CLINIC | Age: 60
End: 2020-08-24
Attending: INTERNAL MEDICINE
Payer: COMMERCIAL

## 2020-08-24 ENCOUNTER — APPOINTMENT (OUTPATIENT)
Dept: PHYSICAL THERAPY | Facility: CLINIC | Age: 60
End: 2020-08-24
Attending: INTERNAL MEDICINE
Payer: COMMERCIAL

## 2020-08-24 PROCEDURE — 40000007 ZZH STATISTIC ADULT CD FACE TO FACE-NO CHRG

## 2020-08-24 PROCEDURE — 99232 SBSQ HOSP IP/OBS MODERATE 35: CPT | Performed by: NURSE PRACTITIONER

## 2020-08-24 PROCEDURE — 25000132 ZZH RX MED GY IP 250 OP 250 PS 637: Performed by: STUDENT IN AN ORGANIZED HEALTH CARE EDUCATION/TRAINING PROGRAM

## 2020-08-24 PROCEDURE — 25000132 ZZH RX MED GY IP 250 OP 250 PS 637: Performed by: INTERNAL MEDICINE

## 2020-08-24 PROCEDURE — 99233 SBSQ HOSP IP/OBS HIGH 50: CPT | Performed by: STUDENT IN AN ORGANIZED HEALTH CARE EDUCATION/TRAINING PROGRAM

## 2020-08-24 PROCEDURE — 25000132 ZZH RX MED GY IP 250 OP 250 PS 637: Performed by: HOSPITALIST

## 2020-08-24 PROCEDURE — 25000132 ZZH RX MED GY IP 250 OP 250 PS 637: Performed by: PHYSICIAN ASSISTANT

## 2020-08-24 PROCEDURE — 12000000 ZZH R&B MED SURG/OB

## 2020-08-24 PROCEDURE — 97112 NEUROMUSCULAR REEDUCATION: CPT | Mod: GP | Performed by: PHYSICAL THERAPIST

## 2020-08-24 PROCEDURE — 97535 SELF CARE MNGMENT TRAINING: CPT | Mod: GO

## 2020-08-24 RX ORDER — QUETIAPINE FUMARATE 25 MG/1
25 TABLET, FILM COATED ORAL 2 TIMES DAILY
Status: DISCONTINUED | OUTPATIENT
Start: 2020-08-24 | End: 2020-08-25

## 2020-08-24 RX ADMIN — FOLIC ACID 1 MG: 1 TABLET ORAL at 07:47

## 2020-08-24 RX ADMIN — ACETAMINOPHEN 650 MG: 325 TABLET, FILM COATED ORAL at 20:09

## 2020-08-24 RX ADMIN — QUETIAPINE 25 MG: 25 TABLET ORAL at 20:09

## 2020-08-24 RX ADMIN — PANTOPRAZOLE SODIUM 40 MG: 40 TABLET, DELAYED RELEASE ORAL at 07:44

## 2020-08-24 RX ADMIN — LACTULOSE 20 G: 10 SOLUTION ORAL at 07:44

## 2020-08-24 RX ADMIN — RIFAXIMIN 550 MG: 550 TABLET ORAL at 20:09

## 2020-08-24 RX ADMIN — QUETIAPINE 12.5 MG: 25 TABLET, FILM COATED ORAL at 07:44

## 2020-08-24 RX ADMIN — LACTULOSE 20 G: 10 SOLUTION ORAL at 20:09

## 2020-08-24 RX ADMIN — FLUTICASONE FUROATE AND VILANTEROL TRIFENATATE 1 PUFF: 200; 25 POWDER RESPIRATORY (INHALATION) at 07:44

## 2020-08-24 RX ADMIN — MULTIPLE VITAMINS W/ MINERALS TAB 1 TABLET: TAB at 07:47

## 2020-08-24 RX ADMIN — PANTOPRAZOLE SODIUM 40 MG: 40 TABLET, DELAYED RELEASE ORAL at 16:24

## 2020-08-24 RX ADMIN — RIFAXIMIN 550 MG: 550 TABLET ORAL at 07:44

## 2020-08-24 ASSESSMENT — ACTIVITIES OF DAILY LIVING (ADL)
TRANSFERRING: 1-->ASSISTIVE EQUIPMENT
ADLS_ACUITY_SCORE: 17
RETIRED_COMMUNICATION: 2-->DIFFICULTY UNDERSTANDING (NOT RELATED TO LANGUAGE BARRIER)
ADLS_ACUITY_SCORE: 17
BATHING: 0-->INDEPENDENT
FALL_HISTORY_WITHIN_LAST_SIX_MONTHS: YES
ADLS_ACUITY_SCORE: 17
ADLS_ACUITY_SCORE: 22
RETIRED_EATING: 0-->INDEPENDENT
SWALLOWING: 2-->DIFFICULTY SWALLOWING LIQUIDS/FOODS
ADLS_ACUITY_SCORE: 22
DRESS: 0-->INDEPENDENT
COGNITION: 2 - DIFFICULTY WITH ORGANIZING THOUGHTS
AMBULATION: 1-->ASSISTIVE EQUIPMENT
TOILETING: 0-->INDEPENDENT
ADLS_ACUITY_SCORE: 21

## 2020-08-24 NOTE — PLAN OF CARE
DATE & TIME: 8/23/20 AM shift  Cognitive Concerns/ Orientation : Pt A&O x2. Able to reorient pt at times but others easily frustrated and forgetful, wants to go home.   BEHAVIOR & AGGRESSION TOOL COLOR: Yellow, code 21 called at 11:30 am. Placed on 72 hr hold.   CIWA SCORE: NA  ABNL VS/O2: VSS on RA  MOBILITY: SBA, GB/W. Bed alarm and chair alarm in place but pt learned how to turn it off.   PAIN MANAGMENT: Denies  DIET:soft diet, eating well.   BOWEL/BLADDER: on lactulose--urgency and frequency- few BMs this shift.   ABNL LAB/BG: n/a  DRAIN/DEVICES: PIV Saline locked  TELEMETRY RHYTHM: NA  SKIN: Bruised, scabbed.   D/C DAY/GOALS/PLACE: discharge pending. Psych evaluation pending.   PT/OT/Speech/SW are following  OTHER IMPORTANT INFO: Code 21 called; pt was trying to leave, walked all the way to elevator. Staff was able to talk him into returning to his room. Pt continued to be very agitated and requested to see his doctor. 72 hr hold placed. Psych re consulted.

## 2020-08-24 NOTE — PLAN OF CARE
DATE & TIME: 8/24/20 5609-0420  Cognitive Concerns/ Orientation : Pt A&O x1-2. Calm and cooperative.  BEHAVIOR & AGGRESSION TOOL COLOR: Yellow, impulsive but redirectable this shift.  CIWA SCORE: NA  ABNL VS/O2: VSS on RA  MOBILITY: SBA, GB/W.   PAIN MANAGMENT: Denies  DIET: Dental soft diet  BOWEL/BLADDER: on lactulose--urgency and frequency- 1 BM this shift.   ABNL LAB/BG: None new  DRAIN/DEVICES: PIV Saline locked  TELEMETRY RHYTHM: NA  SKIN: Bruised, scabbed   D/C DAY/GOALS/PLACE: Discharge pending. Psych evaluation completed. PT/OT/Speech/SW are following  OTHER IMPORTANT INFO: IV Haldol available, avoid for placement when possible.  Seroquel now scheduled two times a day. Psych consulted and maintains pt is not decisional.

## 2020-08-24 NOTE — PLAN OF CARE
DATE & TIME: 8/23/20 3-11pm shift  Cognitive Concerns/ Orientation : Pt A&O x1-2. pt more cooperative this shift. Able to use call light majority of time to use the bathroom.  BEHAVIOR & AGGRESSION TOOL COLOR: Green, redirectable this shift. Seroquel now scheduled two times a day. Psych consulted and maintains pt is not decisional.  CIWA SCORE: NA  ABNL VS/O2: VSS on RA  MOBILITY: SBA, GB/W. Bed alarm and chair alarm in place but pt learned how to turn chair alarm off.   PAIN MANAGMENT: mild back pain tylenol given x2 ex pt only wanted one tab tylenol for second dose.  DIET:dental soft diet, eating well.   BOWEL/BLADDER: on lactulose--urgency and frequency- few BMs this shift.   ABNL LAB/BG: crt back down to 1.49  DRAIN/DEVICES: PIV Saline locked  TELEMETRY RHYTHM: NA  SKIN: Bruised, scabbed new mepilex on arm.  D/C DAY/GOALS/PLACE: discharge pending. Psych evaluation completed. PT/OT/Speech/SW are following  OTHER IMPORTANT INFO: IV Haldol available. avoid for placement when possible.

## 2020-08-24 NOTE — PLAN OF CARE
SLP: Attempted to see patient for a meal follow up session, but patient upset and security present to calm him down.

## 2020-08-24 NOTE — CONSULTS
8/24/20 CD consult acknowledged.     Per chart review, pt is confused, only alert and oriented 1-2x,  does not currently process the capacity to make decisions, and patient declined PT reporting he was not feeling well and he was sick. Pt is not appropriate for CD consult at this time. Please re-order consult if patient is fully alert/oriented, able to make decisions, and reports desire to have CD assessment/seek CD treatment services.      Makenzie Navarro, [facility], [facility]  [phone]
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
## 2020-08-24 NOTE — PROGRESS NOTES
[facility]    Hospitalist Progress Note    Date of Service (when I saw the patient): 08/24/2020    Assessment & Plan   Abhijeet Mccauley is a 59 year old male who presents with fall.    Falls  Encephalopathy, likely metabolic and alcohol withdrawal and possible hepatic  Hospitalized 7/2020 for UTI. With increasing confusion and falls. Recent alcohol treatment 7/4-8/5, began drinking immediately upon discharge. Confused on presentation. With at least 2 falls. 4-5 beers (at least) daily.  On presentation confusion and lethargy. Lactic acid elevated at 2.6. ammonia 54. WBC 13. UA with large blood and large leuest. Etoh 0.03 on admission. CT abdomen with cirrhosis with varices, small volume ascites, R 7th and 10th rib fractures. CT head/ c-spine negative for acute fracture. he is afebrile and with no abdominal pain, making SBP unlikely.  He has improved but still a bit confused and impulsive. 8/23 assessed by psychiatry and found to be non-decisional. Pt threatened to leave the hospital on 8/24, after discussion with psych pt was placed on 72 hour hold for further assessment of cognition and safe discharge planning.   -8/13 blood cultures no growth  -urine culture negative  -ceftriaxone 1g IV q24 hours started 8/13, with a negative urine and little evidence of SBP antibiotics were stopped on 8/18.  Lactate levels are baseline  -HE management as below  -encephalopathy improved, therapies (PT/SLP) recommending TCU  -increase seroquel 25 mg at HS  -slums on 8/20was  9/30.  - will ask OT to repeat slums to assess for improvement.   -psychiatry consult for decision making capacity - pt found to be non-decisional, but also did not recommend commitment.   -72 hour hold placed on 8/24 11:59am   -psychiatry to re-evaluate on 8/25     Alcoholic cirrhosis  Concern for hepatic encephalopathy  PTA furosemide 20 mg daily, lactulose 20 gm TID, rifaximin 550 BID, spironolactone 25 mg daily  Longstanding history of  alcohol use disorder with resultant cirrhosis. LFT's ok except alk phos (see below). No abdominal pain. Ammonia on admission at 54, improved 8/14 to 29. Total bili rising, 1.7 on 8/17  - lactulose 20 gm BID   - rifaximin 550 mg BID   -Prior to admission diuretics are on hold, he received a dose on 8/18 of Lasix, with good effect. Prior Cr 1.9-2 in 3/2019, actually better this admission and wonder if over diuresing prior.    Anemia  Known history of varices with banding. Reported hemoccult positive. Hemoglobin on admission was 6.6.  INR on admission is 1.45.    - transfused x 1 unit in ED, appreciate gastroenterology input, continue Protonix 40 mg IV twice daily   - pantoprazole 40 mg BID  -Hemoglobin is stable at ~9.8 8/23    Alcohol use disorder  H/o Etoh withdrawal seizures  Longstanding h/o Etoh use/ abuse. Multiple recent admissions for Etoh related issues. H/o Etoh w/d seizures.   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 Lino  Baseline creatinine appears to be in the 1.4-1.9 range as of recent, 1.68 on admission. Patient with CT on admission showing ureteric stent present.  - avoid nephrotoxins  - creatinine stable    Hypernatremia  Hypokalemia  Improved/ resolved    Respiratory distress 8/16 - resolved  Type II NSTEMI  Early am 8/16 with respiratory distress. Audible expiratory wheezes appreciated. Otherwise lung sounds clear. O2 sats 97% on 2L. Vitals otherwise stable. CXR clear, BNP elevated 5477. Troponin detectable at 0.070. VBG 7.35/36/30  -Patient did receive a dose of IV Lasix with significant diuresis,  currently creatinine is stable  - repeat troponin am 8/18 0.027  - recent echo 6/2020 with EF 55-60, mild concentric LV hypertrophy. Will monitor for repeat need  - suspect underlying COPD, less likely acute HFpEF.  Chest x-ray was clear    Hypoglycemia  Resolved as PO intake improved    Elevated alk phos  Seen by GI inpatient 6/23. Suspected Alk phos elevation multifactorial, related to cirrhosis, recent fractures. At that time further workup  (EUS with liver biopsy) unlikely to  with ongoing active Etoh history and lack of follow up  - monitor for now    Right 10th posterior rib fracture  Patient with CT on admission showing comminuted segmental fracture of right seventh rib with bilateral rib fractures otherwise stable, as well as right posterior rib fracture with increased displacement.  -Monitor  -prn acetaminophen    History of AML  Diagnosed 2008, s/p chemotherapy, complete remission >10 years  -Monitor    COPD  - Continue PTA inhaler Advair (Breo)  500-50 1 puff daily   - prn duonebs available    MAGALIE  Hold medications    FEN (fluids, electrolytes and nutrition): mechanical soft diet with thin liquids  Discussed with nursing.  DVT Prophylaxis: Pneumatic Compression Devices  Code Status: Full Code    Disposition: Expected discharge in 1-2 days pending psychiatry evaluation    Yoselin Soto MD        Interval History   Overnight events reviewed. Pt is very agitated this morning. He states that he is leaving today otherwise he will lose his housing. Says that he will maite the hospital for keeping him here. Unable to have a coherent conversation with him.      -Data reviewed today: I reviewed all new labs and imaging results over the last 24 hours. I personally reviewed no images or EKG's today.    Physical Exam   Temp: 98.4  F (36.9  C) Temp src: Oral BP: 135/70 Pulse: 85   Resp: 18 SpO2: 98 % O2 Device: None (Room air)    Vitals:    08/22/20 0500 08/23/20 0526 08/24/20 0354   Weight: 71.4  kg (157 lb 6.5 oz) 72.1 kg (158 lb 15.2 oz) 72.2 kg (159 lb 3.2 oz)     Vital Signs with Ranges  Temp:  [98.1  F (36.7  C)-98.5  F (36.9  C)] 98.4  F (36.9  C)  Pulse:  [75-85] 85  Resp:  [18] 18  BP: (103-151)/(49-76) 135/70  SpO2:  [98 %] 98 %  I/O last 3 completed shifts:  In: 1200 [P.O.:1200]  Out: -     Constitutional: Awake, alert, doesn't know where he is, or why he is here   Respiratory: no increased work-of breathing   Cardiovascular: RRR  GI: non-distended  Skin/Integumen: No rashes, no cyanosis, no LE edema  Neuro : moving all 4 extremities, stable gait, not oriented to place or situation.   Psych: Agitated       Medications       fluticasone-vilanterol  1 puff Inhalation Daily     folic acid  1 mg Oral Daily     lactulose  20 g Oral BID     miconazole   Topical BID     multivitamin w/minerals  1 tablet Oral Daily     pantoprazole  40 mg Oral BID AC     QUEtiapine  12.5 mg Oral BID     rifaximin  550 mg Oral BID     sodium chloride (PF)  3 mL Intracatheter Q8H       Data   Recent Labs   Lab 08/23/20  0800 08/22/20  0836 08/21/20  0834 08/20/20  0935  08/19/20  0936 08/18/20  0842   WBC  --   --   --  8.1  --  8.4 7.5   HGB 9.8*  --   --  9.8*  --  10.0* 9.6*   MCV  --   --   --  89  --  88 89   PLT  --   --   --  145*  --  140* 121*   INR  --   --   --   --   --   --  1.61*    140 140 141  --  141 147*   POTASSIUM 3.8 3.9 3.8 3.8   < > 3.3* 3.4   CHLORIDE 114* 112* 113* 115*  --  113* 118*   CO2 21 22 19* 19*  --  22 24   BUN 13 11 11 9  --  10 9   CR 1.49* 1.70* 1.61* 1.45*  --  1.46* 1.43*   ANIONGAP 5 6 8 7  --  6 5   BEE 8.9 8.9 9.1 8.6  --  8.6 8.6   GLC 90 90 96 84  --  106* 91   ALBUMIN  --   --   --  2.8*  --  2.7* 2.5*   PROTTOTAL  --   --   --  6.3*  --  6.4* 6.0*   BILITOTAL  --   --   --  1.9*  --  2.1* 1.9*   ALKPHOS  --   --   --  395*  --  407* 374*   ALT  --   --   --  21  --  21 22   AST  --   --   --  27  --  23 26   TROPI  --   --   --   --   --   --  0.027    < > = values in this  interval not displayed.       No results found for this or any previous visit (from the past 24 hour(s)).

## 2020-08-24 NOTE — PLAN OF CARE
PT: Pt politely declined PT, reported not feeling well said he was sick and needing to get cleaned up and wanted to finish eating breakfast.

## 2020-08-24 NOTE — CODE/RAPID RESPONSE
[facility]    RRT Note:  CODE 21  8/24/2020   Time Called: 11: 24 AM    RRT called for: Code 21     Assessment & Plan   Encephalopathy versus delirium with behavior disturbance   Code 21 called after Mr. Mccauley attempted to leave the hospital. Per Dr. Renae's note on 8/23/2020 Mr. Mccauley does not currently have the capacity to make decisions. Mr. Mccauley insists he has a ride outside, [facility], and allowed me to call the number he has for [facility]. The number provided is to a business in [place], [place], and no one named [facility] is associated with the number. Mr. Mccauley insists he has a safe housing or treatment bed waiting for him, which does not appear to be true.     Per EHR SLUMS 9/30 on 8/20/2020 and SLUMS 17/30 on 7/20/2020. OcCA 11/22 on 6/23/2020.     INTERVENTIONS:  - consult to Social Work for assistance in discharge planning   - 72- hour hold per Dr. Soto    Discussed with and defer further cares to Dr. Soto, Hospitalist.     Code Status: Full Code    LOPEZ Escudero, CNP  Hospitalist Service, House Officer  [facility]     Text Page  Pager: [phone]    Allergies   Allergies   Allergen Reactions     Blood Transfusion Related (Informational Only) Other (See Comments)     Patient has a history of a clinically significant antibody against RBC antigens.  A delay in compatible RBCs may occur.     Famotidine GI Disturbance     Severe abdominal cramps     Pepcid GI Disturbance     Severe abdominal cramps     Vancomycin Visual Disturbance     Hallucinations     Cyclobenzaprine Hives     Hydrocodone-Acetaminophen Rash     Methocarbamol Dermatitis     Localized to face     Vfend Nausea and GI Disturbance     IV - cold sweats ; Iron tablet - nausea/stomach pain       Physical Exam   Vital Signs with Ranges:  Temp:  [98.1  F (36.7  C)-98.5  F (36.9  C)] 98.4  F (36.9  C)  Pulse:  [75-85] 85  Resp:  [18] 18  BP: (103-151)/(49-76) 135/70  SpO2:  [98 %] 98 %  I/O last  3 completed shifts:  In: 1200 [P.O.:1200]  Out: -     Constitutional: 59- year old agitated male without obvious other acute distress.   Pulmonary: He is not hypoxic. No obvious acute respiratory distress.   Cardiovascular: He appears adequately perfused.   Skin/Integumen: No obvious concerning rashes or lesions on exposed skin.   Neuro: Awake, alert, unclear orientation. Non-focal.   Psych:  Agitated.   Extremities: Moves all extremities.     CBC with Diff:  Recent Labs   Lab Test 08/23/20  0800 08/20/20  0935  08/18/20  0842   WBC  --  8.1   < > 7.5   HGB 9.8* 9.8*   < > 9.6*   MCV  --  89   < > 89   PLT  --  145*   < > 121*   INR  --   --   --  1.61*    < > = values in this interval not displayed.      Absolute Retic (10e9/L)   Date Value   06/23/2020 125.9 (H)     % Retic (%)   Date Value   06/23/2020 5.1 (H)       Lactic Acid:    Lab Results   Component Value Date    LACT 1.9 08/14/2020           Comprehensive Metabolic Panel:  Recent Labs   Lab 08/23/20  0800  08/20/20  0935      < > 141   POTASSIUM 3.8   < > 3.8   CHLORIDE 114*   < > 115*   CO2 21   < > 19*   ANIONGAP 5   < > 7   GLC 90   < > 84   BUN 13   < > 9   CR 1.49*   < > 1.45*   GFRESTIMATED 50*   < > 51*   GFRESTBLACK 58*   < > 60*   BEE 8.9   < > 8.6   PROTTOTAL  --   --  6.3*   ALBUMIN  --   --  2.8*   BILITOTAL  --   --  1.9*   ALKPHOS  --   --  395*   AST  --   --  27   ALT  --   --  21    < > = values in this interval not displayed.       Time Spent on this Encounter   I spent 30 minutes on the unit/floor managing the care of Abhijeet Mccauley. Over 50% of my time was spent counseling the patient and/or coordinating care regarding services listed in this note.

## 2020-08-24 NOTE — PLAN OF CARE
Discharge Planner PT   Patient plan for discharge: Move into an efficiency apt, per MR  Current status: Pt SBA sup>sit at EOB. Ambulated 10' CGA to window in room. Three dynamic balance exercises CGA 5'x4 each. Seated strengthening exercises performed in recliner. Static balance exercises CGA. Difficulty with eyes closed static stand. Required rest breaks throughout.  Barriers to return to prior living situation: Falls risk, impaired balance, functional activity tolerance and safety awareness  Recommendations for discharge: TCU  Rationale for recommendations: Pt would benefit from continued skilled rehab services to increase safety awareness and functional activity tolerance.       Entered by: Elaine Almazan 08/24/2020 4:31 PM

## 2020-08-24 NOTE — PLAN OF CARE
"Discharge Planner OT   Patient [place] for [place]    Current status: Pt required SBA and FWW for retrieval of ADL items in hallway. Pt completed functional transfer mod independently with FWW. Confused throughout session and fixated on \"finding out my rights.\"     Barriers to return to prior living situation: Current level of A required; Fall risk    Recommendations for discharge: TCU    Rationale for recommendations: Pt continues to be limited by impaired cognition, safety, and balance; thus, OT will continue with POC.        Entered by: Anny Gibbs 08/24/2020 10:47 AM     "

## 2020-08-25 ENCOUNTER — APPOINTMENT (OUTPATIENT)
Dept: SPEECH THERAPY | Facility: CLINIC | Age: 60
End: 2020-08-25
Attending: INTERNAL MEDICINE
Payer: COMMERCIAL

## 2020-08-25 ENCOUNTER — APPOINTMENT (OUTPATIENT)
Dept: PHYSICAL THERAPY | Facility: CLINIC | Age: 60
End: 2020-08-25
Attending: INTERNAL MEDICINE
Payer: COMMERCIAL

## 2020-08-25 PROCEDURE — 12000000 ZZH R&B MED SURG/OB

## 2020-08-25 PROCEDURE — 25000132 ZZH RX MED GY IP 250 OP 250 PS 637: Performed by: PHYSICIAN ASSISTANT

## 2020-08-25 PROCEDURE — 97112 NEUROMUSCULAR REEDUCATION: CPT | Mod: GP | Performed by: PHYSICAL THERAPIST

## 2020-08-25 PROCEDURE — 25000132 ZZH RX MED GY IP 250 OP 250 PS 637: Performed by: HOSPITALIST

## 2020-08-25 PROCEDURE — 99232 SBSQ HOSP IP/OBS MODERATE 35: CPT | Performed by: PSYCHIATRY & NEUROLOGY

## 2020-08-25 PROCEDURE — 92526 ORAL FUNCTION THERAPY: CPT | Mod: GN

## 2020-08-25 PROCEDURE — 25000132 ZZH RX MED GY IP 250 OP 250 PS 637: Performed by: PSYCHIATRY & NEUROLOGY

## 2020-08-25 PROCEDURE — 99232 SBSQ HOSP IP/OBS MODERATE 35: CPT | Performed by: STUDENT IN AN ORGANIZED HEALTH CARE EDUCATION/TRAINING PROGRAM

## 2020-08-25 PROCEDURE — 25000132 ZZH RX MED GY IP 250 OP 250 PS 637: Performed by: INTERNAL MEDICINE

## 2020-08-25 PROCEDURE — 25000132 ZZH RX MED GY IP 250 OP 250 PS 637: Performed by: STUDENT IN AN ORGANIZED HEALTH CARE EDUCATION/TRAINING PROGRAM

## 2020-08-25 RX ORDER — QUETIAPINE FUMARATE 50 MG/1
50 TABLET, FILM COATED ORAL AT BEDTIME
Status: DISCONTINUED | OUTPATIENT
Start: 2020-08-25 | End: 2020-08-29 | Stop reason: HOSPADM

## 2020-08-25 RX ORDER — TRAZODONE HYDROCHLORIDE 50 MG/1
50 TABLET, FILM COATED ORAL
Status: DISCONTINUED | OUTPATIENT
Start: 2020-08-25 | End: 2020-08-29 | Stop reason: HOSPADM

## 2020-08-25 RX ORDER — QUETIAPINE FUMARATE 25 MG/1
25 TABLET, FILM COATED ORAL 2 TIMES DAILY
Status: DISCONTINUED | OUTPATIENT
Start: 2020-08-25 | End: 2020-08-29 | Stop reason: HOSPADM

## 2020-08-25 RX ADMIN — FOLIC ACID 1 MG: 1 TABLET ORAL at 08:04

## 2020-08-25 RX ADMIN — LACTULOSE 20 G: 10 SOLUTION ORAL at 08:03

## 2020-08-25 RX ADMIN — QUETIAPINE FUMARATE 50 MG: 50 TABLET ORAL at 21:03

## 2020-08-25 RX ADMIN — FLUTICASONE FUROATE AND VILANTEROL TRIFENATATE 1 PUFF: 200; 25 POWDER RESPIRATORY (INHALATION) at 08:04

## 2020-08-25 RX ADMIN — MULTIPLE VITAMINS W/ MINERALS TAB 1 TABLET: TAB at 08:03

## 2020-08-25 RX ADMIN — RIFAXIMIN 550 MG: 550 TABLET ORAL at 08:03

## 2020-08-25 RX ADMIN — RIFAXIMIN 550 MG: 550 TABLET ORAL at 21:03

## 2020-08-25 RX ADMIN — QUETIAPINE 25 MG: 25 TABLET ORAL at 08:03

## 2020-08-25 RX ADMIN — QUETIAPINE 25 MG: 25 TABLET ORAL at 13:37

## 2020-08-25 RX ADMIN — LACTULOSE 20 G: 10 SOLUTION ORAL at 21:03

## 2020-08-25 RX ADMIN — PANTOPRAZOLE SODIUM 40 MG: 40 TABLET, DELAYED RELEASE ORAL at 06:52

## 2020-08-25 RX ADMIN — ACETAMINOPHEN 650 MG: 325 TABLET, FILM COATED ORAL at 11:03

## 2020-08-25 RX ADMIN — PANTOPRAZOLE SODIUM 40 MG: 40 TABLET, DELAYED RELEASE ORAL at 15:51

## 2020-08-25 ASSESSMENT — ACTIVITIES OF DAILY LIVING (ADL)
ADLS_ACUITY_SCORE: 20

## 2020-08-25 NOTE — CONSULTS
Psychiatry consult:  Patient seen; full note to follow    Symptoms of delirium persist which likely contribute to episodes of agitation.    Recommendations:   Increase Seroquel to 25mg TID    SW consult to help coordinate a plan for CD treatment which he states was being conducted through [facility].    I will discontinue the 72 hour hold to continue treatment voluntarily. If attempts to elope become a reoccurring issue, another hold may again be considered with consideration to petition for commitment .    Continue 1:1 staffing to minimize elopement risk.     Encouraged the patient to cooperate with transitioning to a TCU once medically stable. CD treatment would be more ideal afterwards unless he improves significantly prior to discharge.     Please reconsult with psychiatry as needed.   Kain Topete MD   Text Page

## 2020-08-25 NOTE — CONSULTS
[facility]  Psychiatry Consultation - Follow-up note      Interim History:   Psychiatric follow-up requested noting that the patient had attempted to leave the hospital yesterday and was placed on a 72-hour hold.  Records indicate that he was fairly confused at the time.    On examination today, the patient expressed frustration regarding ongoing hospitalization.  He explains that he was working through his county of [facility] on identifying a treatment facility for chemical dependency treatment however his hospitalization is now delaying that progress.  He seemed happy to hear that we could coordinate treatment arrangements that would coincide with the services that his [facility] was helping him acquire.  He stated that if we were able to help place him into a treatment facility near [place], [place] that he would be willing to continue his hospital course voluntarily.  He complained of insomnia and wanted me to review his medications to ensure that his prior to admission medications were available.  Specifically, he mentioned trazodone which he takes to address insomnia.  He seemed content with Seroquel and finds it helpful at improving his mood and reducing his anxiety.  He denied suicidal and homicidal thoughts.  He denied auditory and visual hallucinations or other symptoms of psychosis.  No medication side effects reported today.    He was able to identify his struggles with alcohol addiction and was open to pursuing chemical dependency treatment.  We discussed that chemical dependency treatment could be explored after regaining his physical mobility through a TCU.           Medications:       fluticasone-vilanterol  1 puff Inhalation Daily     folic acid  1 mg Oral Daily     lactulose  20 g Oral BID     miconazole   Topical BID     multivitamin w/minerals  1 tablet Oral Daily     pantoprazole  40 mg Oral BID AC     QUEtiapine  25 mg Oral BID     QUEtiapine  50 mg Oral At Bedtime      rifaximin  550 mg Oral BID     sodium chloride (PF)  3 mL Intracatheter Q8H          Allergies:     Allergies   Allergen Reactions     Blood Transfusion Related (Informational Only) Other (See Comments)     Patient has a history of a clinically significant antibody against RBC antigens.  A delay in compatible RBCs may occur.     Famotidine GI Disturbance     Severe abdominal cramps     Pepcid GI Disturbance     Severe abdominal cramps     Vancomycin Visual Disturbance     Hallucinations     Cyclobenzaprine Hives     Hydrocodone-Acetaminophen Rash     Methocarbamol Dermatitis     Localized to face     Vfend Nausea and GI Disturbance     IV - cold sweats ; Iron tablet - nausea/stomach pain          Labs:   No results found for this or any previous visit (from the past 24 hour(s)).       Psychiatric Examination:     BP (!) 146/76 (BP Location: Right arm)   Pulse 83   Temp 97.6  F (36.4  C) (Oral)   Resp 17   Wt 72.2 kg (159 lb 1.6 oz)   SpO2 98%   BMI 23.49 kg/m    Weight is 159 lbs 1.6 oz  Body mass index is 23.49 kg/m .  Orthostatic Vitals     None            Appearance: awake, alert  Attitude:  somewhat cooperative  Eye Contact:  fair  Mood:  angry  Affect:  labile  Speech:  clear, coherent  Psychomotor Behavior:  no evidence of tardive dyskinesia, dystonia, or tics  Throught Process:  linear  Associations:  no loose associations  Thought Content:  no evidence of suicidal ideation or homicidal ideation and no evidence of psychotic thought  Insight:  limited  Judgement:  limited  Oriented to:  Oriented to person and place.  When questioned regarding the date, he initially said that he does not know.  When individually question regarding the month, he correctly stated August.  When questioned regarding the date, he again said he did not know and later proceeded to guess the 25th which was correct.  Attention Span and Concentration:  limited  Recent and Remote Memory:  limited           DIagnoses:      Delirium, multiple etiologies  Alcohol use disorder, severe  Alcohol withdrawal, resolving      Recommendations:     Increase Seroquel to 25mg TID     SW consult to help coordinate a plan for CD treatment which he states was being conducted through [facility].     I will discontinue the 72 hour hold to continue treatment voluntarily.  He seemed more agreeable to cooperate with a plan for TCU and/or residential treatment if it was closer to a particular area, i.e. [place], .  If attempts to elope become a reoccurring issue, another hold may again be considered with consideration to petition for commitment .     Continue 1:1 staffing to minimize elopement risk.      Encouraged the patient to cooperate with transitioning to a TCU once medically stable. CD treatment would be more ideal afterwards unless he improves significantly prior to discharge.     Please reconsult with Psychiatry as needed.   Kain Topete MD   Text Page

## 2020-08-25 NOTE — PLAN OF CARE
"DATE & TIME: 8/25/20 AM shift  Cognitive Concerns/ Orientation : Pt A&O x2, disoriented to time and situation. Able to redirect this shift.   BEHAVIOR & AGGRESSION TOOL COLOR: Green. Gets easily agitated during MD rounds. \"I want to go home\".  CIWA SCORE: NA  ABNL VS/O2: VSS on RA  MOBILITY: SBA, GB/W. Sitter is present.  PAIN MANAGMENT: Denies  DIET:Regular diet now. Tolerating his meals.   BOWEL/BLADDER: Scheduled lactulose urgency at times.   ABNL LAB/BG: n/a  DRAIN/DEVICES: PIV Saline locked  TELEMETRY RHYTHM: NA  SKIN: Bruised, scabbed.   D/C DAY/GOALS/PLACE: discharge pending. SW is following. Chem dep evaluation pending.   OTHER IMPORTANT INFO: 72 hr hold discontinued. Pt agrees to stay voluntary. Seroquel dose for the evening increased to 50 mg. Continues on 25 mg BID of Seroquel.   "

## 2020-08-25 NOTE — PLAN OF CARE
DATE & TIME: 8/23/20 3902-4339  Cognitive Concerns/ Orientation : Pt A&O x2, disoriented to time and situation. Able to redirect this shift.   BEHAVIOR & AGGRESSION TOOL COLOR: Yellow, code 21 called at 11:30 am. Placed on 72 hr hold. Now green.  CIWA SCORE: NA  ABNL VS/O2: VSS on RA  MOBILITY: SBA, GB/W. Bed alarm and chair alarm in place but pt learned how to turn it off.   PAIN MANAGMENT: Denies  DIET: Mech-Dent soft diet, thin liquids no straws.   BOWEL/BLADDER: Scheduled lactulose urgency at times.   ABNL LAB/BG: n/a  DRAIN/DEVICES: PIV Saline locked  TELEMETRY RHYTHM: NA  SKIN: Bruised, scabbed.   D/C DAY/GOALS/PLACE: discharge pending. Psych evaluation pending.   PT/OT/Speech/SW are following  OTHER IMPORTANT INFO: 72 hr hold placed today at 1159. Psych re consulted, sitter at bedside. Nursing will continue to monitor.

## 2020-08-25 NOTE — PROGRESS NOTES
[facility]    Hospitalist Progress Note    Date of Service (when I saw the patient): 08/25/2020    Assessment & Plan   Abhijeet Mccauley is a 59 year old male with past medical history significant for severer alcohol use disorder, alcoholic cirrhosis, and COPD who presents with falls and altered mental status.     Interval History  Pt did well overnight and did not threaten to leave the hospital. When I saw him this morning he again became agitated when talking about disposition. He just states that he will not stay in [place] and will only go to [place], [place]. He is not oriented to situation. He denies any pain or other concerns, but is not very engaging in conversation.     Falls  Encephalopathy, likely metabolic and alcohol withdrawal and possible hepatic  Hospitalized 7/2020 for UTI. With increasing confusion and falls. Recent alcohol treatment 7/4-8/5, began drinking immediately upon discharge. Confused on presentation. With at least 2 falls. 4-5 beers (at least) daily.  On presentation confusion and lethargy. Lactic acid elevated at 2.6. ammonia 54. WBC 13. UA with large blood and large leuest. Etoh 0.03 on admission. CT abdomen with cirrhosis with varices, small volume ascites, R 7th and 10th rib fractures. CT head/ c-spine negative for acute fracture. he is afebrile and with no abdominal pain, making SBP unlikely.   -8/13 blood cultures no growth  -urine culture negative  -ceftriaxone 1g IV q24 hours started 8/13, with a negative urine and little evidence of SBP antibiotics were stopped on 8/18.  Lactate levels are baseline  -HE management as below    Cognitive Impairment   Possible on-going Delirium   Pt's acute encephalopathy and underlying medical problems improved but still a bit confused and impulsive. I suspect there is underlying cognitive impairment likely from long-term alcohol abuse. I am not sure how much more improvement we will see. In June of 2020, he scored 11/22 on a MOCA,  and this hospitalization his SLUMS was 9/30.On 8/23 assessed by psychiatry and found to be non-decisional. Pt threatened to leave the hospital on 8/24, he was briefly placed on a 72 hour hold, which was discontinued on 8/25 by psychiatry after the patient agreed to stay voluntarily.   - increase seroquel 25 mg BID, + 50mg at bedtime   - Therapies (PT/SLP) all recommending TCU  - will ask OT to repeat slums to assess for improvement.   - Continue 1:1  - Encouraged the patient to cooperate with transitioning to a TCU once medically stable. CD treatment would be more ideal afterwards unless he improves significantly prior to discharge.    Alcoholic cirrhosis  Concern for hepatic encephalopathy  PTA furosemide 20 mg daily, lactulose 20 gm TID, rifaximin 550 BID, spironolactone 25 mg daily  Longstanding history of alcohol use disorder with resultant cirrhosis. LFT's ok except alk phos (see below). No abdominal pain. Ammonia on admission at 54, improved 8/14 to 29. Total bili rising, 1.7 on 8/17  - lactulose 20 gm BID   - rifaximin 550 mg BID   -Prior to admission diuretics are on hold, he received a dose on 8/18 of Lasix, with good effect. Prior Cr 1.9-2 in 3/2019, actually better this admission and wonder if over diuresing prior.    Acute on chronic Normocytic Anemia  Known history of varices with banding. Reported hemoccult positive. Hemoglobin on admission was 6.6.  INR on admission is 1.45.    - transfused x 1 unit in ED, appreciate gastroenterology input, continue Protonix 40 mg IV twice daily   - pantoprazole 40 mg BID  -Hemoglobin is stable at ~9.8 8/23    Alcohol use disorder  H/o Etoh withdrawal seizures  Longstanding h/o Etoh use/ abuse. Multiple recent admissions for Etoh related issues. H/o Etoh w/d seizures.   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 Lino  Baseline creatinine appears to be in the 1.4-1.9 range as of recent, 1.68 on admission. Patient with CT on admission showing ureteric stent present.  - avoid nephrotoxins  - creatinine stable    Hypernatremia  Hypokalemia  Improved/ resolved    Respiratory distress 8/16 - resolved  Type II NSTEMI  Early am 8/16 with respiratory distress. Audible expiratory wheezes appreciated. Otherwise lung sounds clear. O2 sats 97% on 2L. Vitals otherwise stable. CXR clear, BNP elevated 5477. Troponin detectable at 0.070. VBG 7.35/36/30  -Patient did receive a dose of IV Lasix with significant diuresis, currently creatinine is stable  - repeat troponin am 8/18 0.027  - recent echo 6/2020 with EF 55-60, mild concentric LV hypertrophy.   - suspect underlying COPD, less likely acute HFpEF.  Chest x-ray was clear    Hypoglycemia  Resolved as PO intake improved    Elevated alk phos  Seen by GI inpatient 6/23. Suspected Alk phos elevation multifactorial, related to cirrhosis, recent fractures. At that time further workup  (EUS with liver biopsy) unlikely to  with ongoing active Etoh history and lack of follow up  - monitor for now    Right 10th posterior rib fracture  Patient with CT on admission showing comminuted segmental fracture of right seventh rib with bilateral rib fractures otherwise stable, as well as right posterior rib fracture with increased displacement.  -Monitor  -prn acetaminophen    History of AML  Diagnosed 2008, s/p chemotherapy, complete remission >10 years  -Monitor    COPD  - Continue PTA inhaler Advair (Breo)  500-50 1 puff daily   - prn duonebs available    MAGALIE  Hold  medications    FEN (fluids, electrolytes and nutrition): mechanical soft diet with thin liquids  Discussed with nursing.  DVT Prophylaxis: Pneumatic Compression Devices  Code Status: Full Code    Disposition: Expected discharge in 1-2 days pending psychiatry evaluation/placement     Yoselin Soto MD        -Data reviewed today: I reviewed all new labs and imaging results over the last 24 hours. I personally reviewed no images or EKG's today.    Physical Exam   Temp: 97.4  F (36.3  C) Temp src: Oral BP: 139/77 Pulse: 79   Resp: 18 SpO2: 96 % O2 Device: None (Room air)    Vitals:    08/23/20 0526 08/24/20 0354 08/25/20 0615   Weight: 72.1 kg (158 lb 15.2 oz) 72.2 kg (159 lb 3.2 oz) 72.2 kg (159 lb 1.6 oz)     Vital Signs with Ranges  Temp:  [97.3  F (36.3  C)-98.2  F (36.8  C)] 97.4  F (36.3  C)  Pulse:  [79-87] 79  Resp:  [16-18] 18  BP: (125-142)/(66-77) 139/77  SpO2:  [94 %-100 %] 96 %  I/O last 3 completed shifts:  In: 220 [P.O.:220]  Out: -     Constitutional: Awake, alert, doesn't know where he is, or why he is here   Respiratory: no increased work-of breathing   Cardiovascular: RRR  GI: non-distended  Skin/Integumen: No rashes, no cyanosis, no LE edema  Neuro : moving all 4 extremities, stable gait, not oriented to place or situation.   Psych: Agitated but redirectable       Medications       fluticasone-vilanterol  1 puff Inhalation Daily     folic acid  1 mg Oral Daily     lactulose  20 g Oral BID     miconazole   Topical BID     multivitamin w/minerals  1 tablet Oral Daily     pantoprazole  40 mg Oral BID AC     QUEtiapine  25 mg Oral BID     QUEtiapine  50 mg Oral At Bedtime     rifaximin  550 mg Oral BID     sodium chloride (PF)  3 mL Intracatheter Q8H       Data   Recent Labs   Lab 08/23/20  0800 08/22/20  0836 08/21/20  0834 08/20/20  0935  08/19/20  0936   WBC  --   --   --  8.1  --  8.4   HGB 9.8*  --   --  9.8*  --  10.0*   MCV  --   --   --  89  --  88   PLT  --   --   --  145*  --  140*   NA  140 140 140 141  --  141   POTASSIUM 3.8 3.9 3.8 3.8   < > 3.3*   CHLORIDE 114* 112* 113* 115*  --  113*   CO2 21 22 19* 19*  --  22   BUN 13 11 11 9  --  10   CR 1.49* 1.70* 1.61* 1.45*  --  1.46*   ANIONGAP 5 6 8 7  --  6   BEE 8.9 8.9 9.1 8.6  --  8.6   GLC 90 90 96 84  --  106*   ALBUMIN  --   --   --  2.8*  --  2.7*   PROTTOTAL  --   --   --  6.3*  --  6.4*   BILITOTAL  --   --   --  1.9*  --  2.1*   ALKPHOS  --   --   --  395*  --  407*   ALT  --   --   --  21  --  21   AST  --   --   --  27  --  23    < > = values in this interval not displayed.       No results found for this or any previous visit (from the past 24 hour(s)).

## 2020-08-25 NOTE — PLAN OF CARE
Discharge Planner SLP   Patient plan for discharge: Did not discuss  Current status: Pt tolerated soft and regular items. He uses the teeth he has well and manipulates a bolus functionally. Adequate oral clearance. No s/sx of aspiration     Recommend regular solids and thin liquids - no straws. Pt to chair for meals, slow rate of intake and small bites/sips.     Barriers to return to prior living situation: Below baseline, cognition/safety  Recommendations for discharge: TCU  Rationale for recommendations: Anticipate pt will meet dysphagia goals prior to discharge         Entered by: Martina Herbert 08/25/2020 1:34 PM

## 2020-08-25 NOTE — PROGRESS NOTES
DATE & TIME: 08/24/2020 Night    Cognitive Concerns/ Orientation : Disoriented to time and situation. Calm/cooperative   BEHAVIOR & AGGRESSION TOOL COLOR: green  CIWA SCORE: n/a   ABNL VS/O2: VSS, room air  MOBILITY: SBA  PAIN MANAGMENT: Denies this shift  DIET: Mechanical Soft, Thin liquids, No straws  BOWEL/BLADDER: Up to bathroom, no BM this shift  ABNL LAB/BG: Creatinine 1.49, Hgb 9.8  DRAIN/DEVICES: R PIV saline locked  TELEMETRY RHYTHM: n/a  SKIN: Skin tear to R arm, scabbed, bruised  TESTS/PROCEDURES: Psych evaluation  D/C DAY/GOALS/PLACE: Pending  OTHER IMPORTANT INFO: PT/OT/SLP/SW following. Sitter at bedside.  MD/RN ROUNDING SIGNED OFF D/E SHIFT: n/a  COMMIT TO SIT DONE AND SIGNED OFF YES

## 2020-08-25 NOTE — PLAN OF CARE
Discharge Planner PT   Patient plan for discharge: Move into an efficiency apt, per MR  Current status: Pt SBA sup>sit at EOB. Ambulated 10' CGA to window in room. Three dynamic balance exercises CGA 5'x5 each. Seated strengthening exercises performed at EOB. Improvement in static balance exercises CGA. Better with eyes closed static stand today. Reported some dizziness and required rest breaks throughout.  Barriers to return to prior living situation: Impaired safety awareness, falls risk, impaired functional activity tolerance  Recommendations for discharge: TCU  Rationale for recommendations: Pt would benefit from continued skilled rehab services to progress functional activity tolerance and increase safety awareness.        Entered by: Elaine Almazan 08/25/2020 9:54 AM

## 2020-08-25 NOTE — CONSULTS
Care Transition Initial Assessment -   Responding to two consults; one for discharge planning and one for assisting patient to coordinate a CD treatment program thru [facility], per patient intererst     Met with: met with patient and also spoke with his sister Rowena, via telephone.  Active Problems:    Anemia       DATA  Lives With: alone   Living Arrangements: house  Rowena explains her brother lives in a heated garage on her property.    No known HCD..  He has siblings and his mother.    Insurance is a [employer] plan  Identified issues/concerns regarding health management:   Patient admitted on 8/13/20 under In-patient status following falls,   His recent history includes a hospitalization at [facility], from 6/12-6/25 with discharge to [facility] on 6/25.  He was transferred back to [facility] on 7/17/20.  His sister, Rowena, did not want him to return to [facility] and rather transfer to [facility] which is close to patient's local brother. He discharged on 7/21 to [facility].  Per Rowena, patient returned home following his stay at [facility], he was home for less than a week when he exhibited confusion.  She does know he had started drinking alcohol again.  She reports she works and cannnot keep a constant eye on patient.    ASSESSMENT  Cognitive Status: compromised, yet able to identify what he does and doesn't want.   Per Dr Curtis consult on 8/24, he did not assess patient to be decisional.  Concerns to be addressed: appropriate and safe discharge plan needs to be addressed.  TCU is the current recommendation by PT,OT and ST.  If patient's cognition improves adequately CD treatment is recommended following TCU.   CD assessment could occur while he is in TCU.    Patient strongly opposed to TCU due to his last TCU stay which he didn't think offered any rehab.  Patient is so strongly opposed to TCU he will not go willingly.   He may be more willing to  consider TCU if writer can place him near [place] where he wants to be for his CD treatment.   PLAN  Follow his progress, tomorrow re-visit conversation about TCU near [place].   Will also look into CD options in the [place] area.  Financial costs for the patient includes:  none expected due to his insurance  Patient given options and choices for discharge yes  Patient/family is agreeable to the plan?  Not accepting TCU recommendation.   Transportation [facility]  Patient Goals and Preferences: treatment in [place]  Patient anticipates discharging to:  Treatment in [place]

## 2020-08-25 NOTE — PROGRESS NOTES
SW:  Consult note to follow.  D:  Psychiatry note from today reviewed.  Note 1:1 is to remain in effect for now.    I:  Met with patient to assess is willingness to enter a TCU.  He became upset and said he will not. He reports the place he went to before was a joke for rehab.  He reports the man that met with him earlier is going to help him get into a program in [place].  He was unable to tell writer the name of the program in [place].   Writer explained we will have a CD counselor see him here for an assessment so we can refer him to a program in [place].  Patient became more upset.    A:  It is writers impression patient was unable to comprehend information and responded by becoming upset with writer.  Patient is not willing to consider TCU likely due to his negative experience at the last TCU and likely because he lacks insight into his deficits.      P:  Writer did leave the room and will follow for progress in patient's cognition. Once cognition improves will re-  approach patient.

## 2020-08-26 ENCOUNTER — APPOINTMENT (OUTPATIENT)
Dept: OCCUPATIONAL THERAPY | Facility: CLINIC | Age: 60
End: 2020-08-26
Attending: STUDENT IN AN ORGANIZED HEALTH CARE EDUCATION/TRAINING PROGRAM
Payer: COMMERCIAL

## 2020-08-26 ENCOUNTER — APPOINTMENT (OUTPATIENT)
Dept: PHYSICAL THERAPY | Facility: CLINIC | Age: 60
End: 2020-08-26
Attending: INTERNAL MEDICINE
Payer: COMMERCIAL

## 2020-08-26 LAB
ALBUMIN SERPL-MCNC: 2.9 G/DL (ref 3.4–5)
ALP SERPL-CCNC: 358 U/L (ref 40–150)
ALT SERPL W P-5'-P-CCNC: 21 U/L (ref 0–70)
ANION GAP SERPL CALCULATED.3IONS-SCNC: 7 MMOL/L (ref 3–14)
AST SERPL W P-5'-P-CCNC: 33 U/L (ref 0–45)
BILIRUB DIRECT SERPL-MCNC: 0.4 MG/DL (ref 0–0.2)
BILIRUB SERPL-MCNC: 0.9 MG/DL (ref 0.2–1.3)
BUN SERPL-MCNC: 13 MG/DL (ref 7–30)
CALCIUM SERPL-MCNC: 8.6 MG/DL (ref 8.5–10.1)
CHLORIDE SERPL-SCNC: 111 MMOL/L (ref 94–109)
CO2 SERPL-SCNC: 19 MMOL/L (ref 20–32)
CREAT SERPL-MCNC: 1.55 MG/DL (ref 0.66–1.25)
ERYTHROCYTE [DISTWIDTH] IN BLOOD BY AUTOMATED COUNT: 20.3 % (ref 10–15)
GFR SERPL CREATININE-BSD FRML MDRD: 47 ML/MIN/{1.73_M2}
GLUCOSE SERPL-MCNC: 131 MG/DL (ref 70–99)
HCT VFR BLD AUTO: 30.6 % (ref 40–53)
HGB BLD-MCNC: 9.3 G/DL (ref 13.3–17.7)
MCH RBC QN AUTO: 26.7 PG (ref 26.5–33)
MCHC RBC AUTO-ENTMCNC: 30.4 G/DL (ref 31.5–36.5)
MCV RBC AUTO: 88 FL (ref 78–100)
PLATELET # BLD AUTO: 164 10E9/L (ref 150–450)
POTASSIUM SERPL-SCNC: 3.7 MMOL/L (ref 3.4–5.3)
PROT SERPL-MCNC: 6.3 G/DL (ref 6.8–8.8)
RBC # BLD AUTO: 3.48 10E12/L (ref 4.4–5.9)
SODIUM SERPL-SCNC: 137 MMOL/L (ref 133–144)
WBC # BLD AUTO: 8.5 10E9/L (ref 4–11)

## 2020-08-26 PROCEDURE — 80076 HEPATIC FUNCTION PANEL: CPT | Performed by: STUDENT IN AN ORGANIZED HEALTH CARE EDUCATION/TRAINING PROGRAM

## 2020-08-26 PROCEDURE — 25000132 ZZH RX MED GY IP 250 OP 250 PS 637: Performed by: PSYCHIATRY & NEUROLOGY

## 2020-08-26 PROCEDURE — 85027 COMPLETE CBC AUTOMATED: CPT | Performed by: STUDENT IN AN ORGANIZED HEALTH CARE EDUCATION/TRAINING PROGRAM

## 2020-08-26 PROCEDURE — 97535 SELF CARE MNGMENT TRAINING: CPT | Mod: GO | Performed by: OCCUPATIONAL THERAPY ASSISTANT

## 2020-08-26 PROCEDURE — 25000132 ZZH RX MED GY IP 250 OP 250 PS 637: Performed by: HOSPITALIST

## 2020-08-26 PROCEDURE — 25000132 ZZH RX MED GY IP 250 OP 250 PS 637: Performed by: INTERNAL MEDICINE

## 2020-08-26 PROCEDURE — 25000132 ZZH RX MED GY IP 250 OP 250 PS 637: Performed by: PHYSICIAN ASSISTANT

## 2020-08-26 PROCEDURE — 80048 BASIC METABOLIC PNL TOTAL CA: CPT | Performed by: STUDENT IN AN ORGANIZED HEALTH CARE EDUCATION/TRAINING PROGRAM

## 2020-08-26 PROCEDURE — 36415 COLL VENOUS BLD VENIPUNCTURE: CPT | Performed by: STUDENT IN AN ORGANIZED HEALTH CARE EDUCATION/TRAINING PROGRAM

## 2020-08-26 PROCEDURE — 99233 SBSQ HOSP IP/OBS HIGH 50: CPT | Performed by: STUDENT IN AN ORGANIZED HEALTH CARE EDUCATION/TRAINING PROGRAM

## 2020-08-26 PROCEDURE — 12000000 ZZH R&B MED SURG/OB

## 2020-08-26 PROCEDURE — 99232 SBSQ HOSP IP/OBS MODERATE 35: CPT | Performed by: PSYCHIATRY & NEUROLOGY

## 2020-08-26 PROCEDURE — 97116 GAIT TRAINING THERAPY: CPT | Mod: GP

## 2020-08-26 PROCEDURE — 97112 NEUROMUSCULAR REEDUCATION: CPT | Mod: GP

## 2020-08-26 RX ADMIN — Medication 1 MG: at 01:21

## 2020-08-26 RX ADMIN — QUETIAPINE 25 MG: 25 TABLET ORAL at 14:25

## 2020-08-26 RX ADMIN — LACTULOSE 20 G: 10 SOLUTION ORAL at 21:15

## 2020-08-26 RX ADMIN — QUETIAPINE 25 MG: 25 TABLET ORAL at 08:28

## 2020-08-26 RX ADMIN — FLUTICASONE FUROATE AND VILANTEROL TRIFENATATE 1 PUFF: 200; 25 POWDER RESPIRATORY (INHALATION) at 08:30

## 2020-08-26 RX ADMIN — PANTOPRAZOLE SODIUM 40 MG: 40 TABLET, DELAYED RELEASE ORAL at 08:28

## 2020-08-26 RX ADMIN — RIFAXIMIN 550 MG: 550 TABLET ORAL at 21:15

## 2020-08-26 RX ADMIN — ACETAMINOPHEN 650 MG: 325 TABLET, FILM COATED ORAL at 21:15

## 2020-08-26 RX ADMIN — PANTOPRAZOLE SODIUM 40 MG: 40 TABLET, DELAYED RELEASE ORAL at 14:26

## 2020-08-26 RX ADMIN — RIFAXIMIN 550 MG: 550 TABLET ORAL at 08:28

## 2020-08-26 RX ADMIN — LACTULOSE 20 G: 10 SOLUTION ORAL at 08:29

## 2020-08-26 RX ADMIN — ACETAMINOPHEN 650 MG: 325 TABLET, FILM COATED ORAL at 12:15

## 2020-08-26 RX ADMIN — ACETAMINOPHEN 650 MG: 325 TABLET, FILM COATED ORAL at 01:21

## 2020-08-26 RX ADMIN — FOLIC ACID 1 MG: 1 TABLET ORAL at 08:28

## 2020-08-26 RX ADMIN — TRAZODONE HYDROCHLORIDE 50 MG: 50 TABLET ORAL at 21:24

## 2020-08-26 RX ADMIN — MULTIPLE VITAMINS W/ MINERALS TAB 1 TABLET: TAB at 08:28

## 2020-08-26 ASSESSMENT — ACTIVITIES OF DAILY LIVING (ADL)
ADLS_ACUITY_SCORE: 20

## 2020-08-26 NOTE — PLAN OF CARE
DATE & TIME: 8/26/20 4705-6726  Cognitive Concerns/ Orientation : Pt A&O x3-4, disoriented to time intermittently. Able to redirect this shift.   BEHAVIOR & AGGRESSION TOOL COLOR: Green.   CIWA SCORE: NA  ABNL VS/O2: VSS on RA  MOBILITY: SBA, GB/W. Sitter at bedside.  PAIN MANAGMENT: Denies  DIET:Regular diet. Tolerating his meals.   BOWEL/BLADDER: Scheduled lactulose- urgency at times.   ABNL LAB/BG: n/a  DRAIN/DEVICES:none  TELEMETRY RHYTHM: NA  SKIN: Bruised, scabbed.   D/C DAY/GOALS/PLACE: Discharge pending. SW is following for TCU placement, pt preferring to or around [facility]. Chem dep evaluation pending.   OTHER IMPORTANT INFO: Pt agrees to stay voluntary. Seroquel dose previous evening increased to 50 mg. Nursing will continue to monitor.

## 2020-08-26 NOTE — CONSULTS
[facility]  Psychiatry Consultation - Follow-up note      Interim History:   I met with the patient on station 66, reviewed the chart.  I spoke with the  at length regarding his situation.  When I came into the room Abhijeet was playing cribbage with house staff.  He is currently oriented x3, no he is been in the hospital for a week and a half.  This contrasts to the interactions Monday where he was more agitated and seemed confused.  We discussed having an  OT assessment, currently the plan is to look at a TCU near his home in [facility].  I think under the circumstances this is reasonable, he was living with his sister in a garage on the property.  He is able to ambulate, he was falling prior to coming into the hospital.  I do not think he is ready to go to chemical dependency treatment at this point, the prospects for a good outcome they are slim at this point.  He was pleasant with me, has no specific complaints.         Medications:       fluticasone-vilanterol  1 puff Inhalation Daily     folic acid  1 mg Oral Daily     lactulose  20 g Oral BID     miconazole   Topical BID     multivitamin w/minerals  1 tablet Oral Daily     pantoprazole  40 mg Oral BID AC     QUEtiapine  25 mg Oral BID     QUEtiapine  50 mg Oral At Bedtime     rifaximin  550 mg Oral BID     sodium chloride (PF)  3 mL Intracatheter Q8H          Allergies:     Allergies   Allergen Reactions     Blood Transfusion Related (Informational Only) Other (See Comments)     Patient has a history of a clinically significant antibody against RBC antigens.  A delay in compatible RBCs may occur.     Famotidine GI Disturbance     Severe abdominal cramps     Pepcid GI Disturbance     Severe abdominal cramps     Vancomycin Visual Disturbance     Hallucinations     Cyclobenzaprine Hives     Hydrocodone-Acetaminophen Rash     Methocarbamol Dermatitis     Localized to face     Vfend Nausea and GI Disturbance     IV - cold  sweats ; Iron tablet - nausea/stomach pain          Labs:     Recent Results (from the past 24 hour(s))   Basic metabolic panel    Collection Time: 08/26/20  7:58 AM   Result Value Ref Range    Sodium 137 133 - 144 mmol/L    Potassium 3.7 3.4 - 5.3 mmol/L    Chloride 111 (H) 94 - 109 mmol/L    Carbon Dioxide 19 (L) 20 - 32 mmol/L    Anion Gap 7 3 - 14 mmol/L    Glucose 131 (H) 70 - 99 mg/dL    Urea Nitrogen 13 7 - 30 mg/dL    Creatinine 1.55 (H) 0.66 - 1.25 mg/dL    GFR Estimate 47 (L) >60 mL/min/[1.73_m2]    GFR Estimate If Black 55 (L) >60 mL/min/[1.73_m2]    Calcium 8.6 8.5 - 10.1 mg/dL   CBC with platelets    Collection Time: 08/26/20  7:58 AM   Result Value Ref Range    WBC 8.5 4.0 - 11.0 10e9/L    RBC Count 3.48 (L) 4.4 - 5.9 10e12/L    Hemoglobin 9.3 (L) 13.3 - 17.7 g/dL    Hematocrit 30.6 (L) 40.0 - 53.0 %    MCV 88 78 - 100 fl    MCH 26.7 26.5 - 33.0 pg    MCHC 30.4 (L) 31.5 - 36.5 g/dL    RDW 20.3 (H) 10.0 - 15.0 %    Platelet Count 164 150 - 450 10e9/L          Psychiatric Examination:     BP (!) 147/68 (BP Location: Left arm)   Pulse 76   Temp 97.4  F (36.3  C) (Oral)   Resp 16   Wt 72.2 kg (159 lb 1.6 oz)   SpO2 97%   BMI 23.49 kg/m    Weight is 159 lbs 1.6 oz  Body mass index is 23.49 kg/m .  Orthostatic Vitals     None            Appearance: awake, alert, sitting in a chair at the side of his bed playing cribbage  Attitude:  somewhat cooperative  Eye Contact:  fair  Mood:  good  Affect:  mood congruent and guarded  Speech:  clear, coherent  Psychomotor Behavior:  no evidence of tardive dyskinesia, dystonia, or tics  Throught Process:  linear  Associations:  no loose associations  Thought Content:  no evidence of suicidal ideation or homicidal ideation and no evidence of psychotic thought  Insight:  limited  Judgement:  limited  Oriented to:  He seems to be oriented to person place day and date.  Attention Span and Concentration:  limited  Recent and Remote Memory:  limited, though he was able to  tell me that he is been in the hospital for the last week and a half           DIagnoses:     Delirium, multiple etiologies  Alcohol use disorder, severe  Alcohol withdrawal, resolving    There seems to be some general improvement in his cognitive status but I do not think he is a good candidate to go to treatment.  A transitional care unit near his home would be a reasonable option.  We should have OT repeat an assessment and get a better sense of his cognition.  If he refuses all reasonable interventions then a 72-hour hold and commitment would be a consideration, though I would make this a last resort      Recommendations:     Continue current medication     SW is pursuing the possibility of a transitional care unit near his home in [facility].     I do not think he needs a sitter, but they should use a door alarm because he is a little bit impulsive and could be an elopement risk    Encouraged the patient to cooperate with transitioning to a TCU once medically stable.    Asking OT to complete cognitive assessment in the next day or so would be reasonable since he   seems to be clearing    Please reconsult with Psychiatry as needed.     Norm Coto MD

## 2020-08-26 NOTE — PROGRESS NOTES
CLINICAL NUTRITION SERVICES - REASSESSMENT NOTE    Recommendations Ordered by Registered Dietitian (RD):   - Discontinue supplements w/ meals   Malnutrition: (8/20)   % Weight Loss:  None noted  % Intake:  <50% for >/= 5 days (severe malnutrition)  Subcutaneous Fat Loss:  Orbital region mild depletion and Upper arm region mild depletion  Muscle Loss:  Dorsal hand region mild depletion  Fluid Retention:  None noted     Malnutrition Diagnosis: Non-Severe malnutrition  In Context of:  Chronic illness or disease  Environmental or social circumstances     EVALUATION OF PROGRESS TOWARD GOALS   Diet: Regular (since 8/25)   Mech/Dental Soft (8/20-8/24)  Supplements w/ meals  B - gelatein high protein jello (150 kcal, 20 g pro)  L - HT smoothie (245 kcal, 16 g pro)  D - magic shake (470 kcal, 17 g pro)     Intake/Tolerance:   - Patient states he disliked all of the supplements sent, except sometimes he did eat the gelatein. He is now tolerating % of meals since 8/20 diet advancement from DD1 --> mech/dental soft. OK to stop supplements in setting of improve PO.     ASSESSED NUTRITION NEEDS:  Dosing Weight 74.9 kg   Estimated Energy Needs: 6281-9519 kg  kcals (25-30 Kcal/Kg)  Justification: maintenance  Estimated Protein Needs:  grams protein (1.2-1.5 g pro/Kg)  Justification: preservation of lean body mass  Estimated Fluid Needs: 1 mL/kcal  Justification: maintenance    NEW FINDINGS:   - awaiting placement     - Labs/Meds reviewed  Folic Acid 1 mg daily, Thera vit M daily,   Lactulose 20 mg BID ---> Stooling - multiple daily     Previous Goals:   Intake of at least 50% meals BID-TID, intake of 1+ supplements daily.  Evaluation: Met    Previous Nutrition Diagnosis:   Inadequate oral intake related to altered mentation and difficulty swallowing as a result of etoh abuse as evidenced by intakes ranging 0-75% for smaller meals since diet advancement 2 days ago, initially pt NPO x5 full days.   Evaluation: Completed -  update below     CURRENT NUTRITION DIAGNOSIS  Malnutrition related to prior inadequate oral intake and altered mentation as evidenced by poor oral intakes early in admission now improved to 100-75% for the past 6 days.     INTERVENTIONS  Recommendations / Nutrition Prescription  Diet per SLP/MD  Micronutrients as ordered   Stop supplements     Implementation  Medical Food Supplement: discontinued    Goals  Intake of % meals BID-TID.       MONITORING AND EVALUATION:  Progress towards goals will be monitored and evaluated per protocol and Practice Guidelines    Cherri Berumen RD, LD  Pager: [phone]

## 2020-08-26 NOTE — PLAN OF CARE
DATE & TIME: 8/25/20 [id number]  Cognitive Concerns/ Orientation : Pt A&O x2, disoriented to time and situation. Calm, pleasant, able to redirect this shift.   BEHAVIOR & AGGRESSION TOOL COLOR: Green.   CIWA SCORE: NA  ABNL VS/O2: VSS on RA  MOBILITY: SBA, GB. Sitter at bedside.  PAIN MANAGMENT: Tylenol PRN for headache x1  DIET:Regular diet. Tolerating his meals.   BOWEL/BLADDER: Scheduled lactulose, urgency at times.   ABNL LAB/BG: n/a  DRAIN/DEVICES: PIV Saline locked  TELEMETRY RHYTHM: NA  SKIN: Bruised, scabbed. Groin rash.  D/C DAY/GOALS/PLACE: Discharge pending. SW is following for TCU placement, pt preferring to or around [facility]. Chem dep evaluation pending.   OTHER IMPORTANT INFO: Pt agrees to stay voluntary. Seroquel dose last evening increased to 50 mg. Nursing will continue to monitor. Melatonin given for sleep, pt has trazadone available.

## 2020-08-26 NOTE — PLAN OF CARE
DATE & TIME: 8/25/20 0821-1118  Cognitive Concerns/ Orientation : Pt A&O x2, disoriented to time and situation. Able to redirect this shift.   BEHAVIOR & AGGRESSION TOOL COLOR: Green.   CIWA SCORE: NA  ABNL VS/O2: VSS on RA  MOBILITY: SBA, GB/W. Sitter at bedside.  PAIN MANAGMENT: Denies  DIET:Regular diet. Tolerating his meals.   BOWEL/BLADDER: Scheduled lactulose urgency at times.   ABNL LAB/BG: n/a  DRAIN/DEVICES: PIV Saline locked  TELEMETRY RHYTHM: NA  SKIN: Bruised, scabbed.   D/C DAY/GOALS/PLACE: Discharge pending. SW is following for TCU placement, pt preferring to or around [place]. Chem dep evaluation pending.   OTHER IMPORTANT INFO: Pt agrees to stay voluntary. Seroquel dose for the evening increased to 50 mg. Nursing will continue to monitor.

## 2020-08-26 NOTE — PLAN OF CARE
Discharge Planner OT   Patient plan for discharge: none stated today     Current status:  pt  sitting up in chair with sitter present in room, per MD order on 8-24 to redo the SLUMS, at first pt did not want to participate with cognitive assessment however after education on why we are doing the cognitive assessment pt was agreeable.  Pt score today on the SLUMS was 14 which is up from the last score of 9 which was given on the 20th.  Pt stated he only has up to an 8th grade education.  Impairments noted with STM, attention and executive functions. This score falls in the to dementia level. Pt completed amb around room, to/from bathroom and completed toilet transfer SBA.      Barriers to return to prior living situation: Current level of A required; Fall risk     Recommendations for discharge: TCU per plan established by the Occupational Therapist     Rationale for recommendations: Pt continues to be limited by impaired cognition, safety, and balance       Entered by: Neisha Parrish 08/26/2020 12:06 PM

## 2020-08-26 NOTE — PLAN OF CARE
Discharge Planner PT   Patient plan for discharge: not discussed today  Current status: Pt tolerated standing activity, but did have noted SOB with prolonged walking requiring periodic rest breaks either sitting or standing.  He ascended/descended 10steps with B rails and CGA using reciprocal step pattern, with noted RPE of 8/10 afterwards.  He has unsteady gait without device but had no LOB during gait and dynamic gait challenges.  Static balance challenges continue to be difficult for him, but no LOB throughout.  Variable hand support and CGA throughout static balance challenges, with tandem stance being most difficult for him today.  Pt was antsy and moving about throughout room upon entry, and at end of session was fatigued and agreeable to sit and rest with sitter in room.  Barriers to return to prior living situation: Impaired safety awareness, falls risk, impaired functional activity tolerance  Recommendations for discharge: TCU  Rationale for recommendations: Pt would benefit from continued skilled rehab services to progress functional activity tolerance and increase safety awareness.        Entered by: Leonor Vicente 08/26/2020 12:20 PM

## 2020-08-27 ENCOUNTER — APPOINTMENT (OUTPATIENT)
Dept: SPEECH THERAPY | Facility: CLINIC | Age: 60
End: 2020-08-27
Attending: INTERNAL MEDICINE
Payer: COMMERCIAL

## 2020-08-27 ENCOUNTER — APPOINTMENT (OUTPATIENT)
Dept: PHYSICAL THERAPY | Facility: CLINIC | Age: 60
End: 2020-08-27
Attending: INTERNAL MEDICINE
Payer: COMMERCIAL

## 2020-08-27 PROCEDURE — 25000132 ZZH RX MED GY IP 250 OP 250 PS 637: Performed by: HOSPITALIST

## 2020-08-27 PROCEDURE — 97110 THERAPEUTIC EXERCISES: CPT | Mod: GP | Performed by: PHYSICAL THERAPY ASSISTANT

## 2020-08-27 PROCEDURE — 25000132 ZZH RX MED GY IP 250 OP 250 PS 637: Performed by: PHYSICIAN ASSISTANT

## 2020-08-27 PROCEDURE — 25000132 ZZH RX MED GY IP 250 OP 250 PS 637: Performed by: INTERNAL MEDICINE

## 2020-08-27 PROCEDURE — 12000000 ZZH R&B MED SURG/OB

## 2020-08-27 PROCEDURE — 92526 ORAL FUNCTION THERAPY: CPT | Mod: GN | Performed by: SPEECH-LANGUAGE PATHOLOGIST

## 2020-08-27 PROCEDURE — 25000132 ZZH RX MED GY IP 250 OP 250 PS 637: Performed by: PSYCHIATRY & NEUROLOGY

## 2020-08-27 PROCEDURE — 99232 SBSQ HOSP IP/OBS MODERATE 35: CPT | Performed by: STUDENT IN AN ORGANIZED HEALTH CARE EDUCATION/TRAINING PROGRAM

## 2020-08-27 RX ADMIN — LACTULOSE 20 G: 10 SOLUTION ORAL at 21:22

## 2020-08-27 RX ADMIN — RIFAXIMIN 550 MG: 550 TABLET ORAL at 08:09

## 2020-08-27 RX ADMIN — FLUTICASONE FUROATE AND VILANTEROL TRIFENATATE 1 PUFF: 200; 25 POWDER RESPIRATORY (INHALATION) at 08:09

## 2020-08-27 RX ADMIN — QUETIAPINE FUMARATE 50 MG: 50 TABLET ORAL at 21:22

## 2020-08-27 RX ADMIN — PANTOPRAZOLE SODIUM 40 MG: 40 TABLET, DELAYED RELEASE ORAL at 08:09

## 2020-08-27 RX ADMIN — PANTOPRAZOLE SODIUM 40 MG: 40 TABLET, DELAYED RELEASE ORAL at 16:55

## 2020-08-27 RX ADMIN — TRAZODONE HYDROCHLORIDE 50 MG: 50 TABLET ORAL at 21:28

## 2020-08-27 RX ADMIN — LACTULOSE 20 G: 10 SOLUTION ORAL at 08:09

## 2020-08-27 RX ADMIN — ACETAMINOPHEN 650 MG: 325 TABLET, FILM COATED ORAL at 10:45

## 2020-08-27 RX ADMIN — QUETIAPINE 25 MG: 25 TABLET ORAL at 14:05

## 2020-08-27 RX ADMIN — MULTIPLE VITAMINS W/ MINERALS TAB 1 TABLET: TAB at 08:09

## 2020-08-27 RX ADMIN — FOLIC ACID 1 MG: 1 TABLET ORAL at 08:09

## 2020-08-27 RX ADMIN — RIFAXIMIN 550 MG: 550 TABLET ORAL at 21:22

## 2020-08-27 RX ADMIN — QUETIAPINE 25 MG: 25 TABLET ORAL at 08:09

## 2020-08-27 ASSESSMENT — ACTIVITIES OF DAILY LIVING (ADL)
ADLS_ACUITY_SCORE: 20
ADLS_ACUITY_SCORE: 13
ADLS_ACUITY_SCORE: 20
ADLS_ACUITY_SCORE: 20

## 2020-08-27 NOTE — PLAN OF CARE
Summary: Anemia, falls, ETOH   DATE & TIME: 8/26 2300-0730  Cognitive Concerns/Orientation: AOx3, disoriented to time   BEHAVIOR & AGGRESSION TOOL COLOR: Green  CIWA SCORE: NA  ABNL VS/O2: VSS on RA  MOBILITY: SBA + gb/walker  PAIN MANAGMENT: Denies   DIET: Regular  BOWEL/BLADDER: Continent - chronic diarrhea d/t lactulose   ABNL LAB/BG: BUN 1.55, , Hgb 9.3  DRAIN/DEVICES: NO IV, refused new one   TELEMETRY RHYTHM: NA  SKIN: Scattered bruises & scabs  D/C DAY/GOALS/PLACE: Pending placement at CD facility close to home  OTHER IMPORTANT INFO: Voluntary - holdable if needed, sitter at bedside, pt refuse gait belt at times,PRN tylenol is available for pain  COMMIT TO SIT DONE AND SIGNED OFF: complete

## 2020-08-27 NOTE — PLAN OF CARE
Speech Language Therapy Discharge Summary    Reason for therapy discharge:    All goals and outcomes met, no further needs identified.    Progress towards therapy goal(s). See goals on Care Plan in [facility] electronic health record for goal details.  Goals met    Therapy recommendation(s):    No further therapy is recommended.SLP: Pt denied dypshagia symptoms at meals. Pt accepted trials of crackers and thin liquids. Two instances of throat clearing. No additional s/sx of aspiration and pt denied dysphagia symptoms. Pt in agreement with no further SLP services at this time. Will complete orders. Continue regualr diet and thin liquids with standard aspiration precautions.

## 2020-08-27 NOTE — PLAN OF CARE
Discharge Planner PT   Patient plan for discharge: Wants to go home  Current status: Pt performed sit to/from stand transfers independently.  Pt amb in room independently.  Gait training x 650 ft without use of device with SBA.  Mild unsteadiness with head turns but no significant LOB.  Performed dynamic gait activities and standing exs with higher level balance deficits noted.   Barriers to return to prior living situation: Impaired safety awareness, falls risk  Recommendations for discharge: TCU per plan established by the PT.   Rationale for recommendations: Pt would benefit from continued skilled rehab services to progress functional activity tolerance and increase safety awareness.        Entered by: Leonor Valenzuela 08/27/2020 5:07 PM

## 2020-08-27 NOTE — PLAN OF CARE
DATE & TIME: 8/27/2020 6159-7910  Cognitive Concerns/Orientation: AOx3, disoriented to time   BEHAVIOR & AGGRESSION TOOL COLOR: Green, yellow at times but redirectable  CIWA SCORE: NA  ABNL VS/O2: VSS on RA  MOBILITY: Up independently in the room  PAIN MANAGMENT: C/o headache, tylenol given with relief   DIET: Regular diet, good appetite  BOWEL/BLADDER: Continent - chronic diarrhea d/t lactulose   ABNL LAB/BG: n/a  DRAIN/DEVICES: No IV access, pt refused  TELEMETRY RHYTHM: NA  SKIN: Scattered bruises & scabs  D/C DAY/GOALS/PLACE: Pending placement at CD facility close to home  OTHER IMPORTANT INFO: Voluntary - holdable if needed, door alarm in place

## 2020-08-27 NOTE — PLAN OF CARE
Summary: Anemia, falls, ETOH   DATE & TIME: 8/26 1900-2300  Cognitive Concerns/Orientation: AOx3, disoriented to time   BEHAVIOR & AGGRESSION TOOL COLOR: Green  CIWA SCORE: NA  ABNL VS/O2: VSS on RA  MOBILITY: SBA + gb/walker  PAIN MANAGMENT: PRN tylenol   DIET: Regular  BOWEL/BLADDER: Continent - chronic diarrhea d/t lactulose   ABNL LAB/BG: BUN 1.55, , Hgb 9.3  DRAIN/DEVICES: NO IV, refused new one   TELEMETRY RHYTHM: NA  SKIN: Scattered bruises & scabs  D/C DAY/GOALS/PLACE: Pending placement at CD facility close to home  OTHER IMPORTANT INFO: Voluntary - holdable if needed, sitter at bedside

## 2020-08-28 ENCOUNTER — APPOINTMENT (OUTPATIENT)
Dept: PHYSICAL THERAPY | Facility: CLINIC | Age: 60
End: 2020-08-28
Attending: INTERNAL MEDICINE
Payer: COMMERCIAL

## 2020-08-28 ENCOUNTER — PATIENT OUTREACH (OUTPATIENT)
Dept: CARE COORDINATION | Facility: CLINIC | Age: 60
End: 2020-08-28

## 2020-08-28 PROCEDURE — 97116 GAIT TRAINING THERAPY: CPT | Mod: GP

## 2020-08-28 PROCEDURE — 97750 PHYSICAL PERFORMANCE TEST: CPT | Mod: GP

## 2020-08-28 PROCEDURE — 25000132 ZZH RX MED GY IP 250 OP 250 PS 637: Performed by: PSYCHIATRY & NEUROLOGY

## 2020-08-28 PROCEDURE — 25000132 ZZH RX MED GY IP 250 OP 250 PS 637: Performed by: INTERNAL MEDICINE

## 2020-08-28 PROCEDURE — 25000132 ZZH RX MED GY IP 250 OP 250 PS 637: Performed by: PHYSICIAN ASSISTANT

## 2020-08-28 PROCEDURE — 99232 SBSQ HOSP IP/OBS MODERATE 35: CPT | Performed by: INTERNAL MEDICINE

## 2020-08-28 PROCEDURE — 25000132 ZZH RX MED GY IP 250 OP 250 PS 637: Performed by: HOSPITALIST

## 2020-08-28 PROCEDURE — 12000000 ZZH R&B MED SURG/OB

## 2020-08-28 RX ADMIN — MICONAZOLE NITRATE: 20 POWDER TOPICAL at 08:46

## 2020-08-28 RX ADMIN — MICONAZOLE NITRATE: 20 POWDER TOPICAL at 20:19

## 2020-08-28 RX ADMIN — PANTOPRAZOLE SODIUM 40 MG: 40 TABLET, DELAYED RELEASE ORAL at 15:14

## 2020-08-28 RX ADMIN — RIFAXIMIN 550 MG: 550 TABLET ORAL at 07:24

## 2020-08-28 RX ADMIN — QUETIAPINE 25 MG: 25 TABLET ORAL at 15:14

## 2020-08-28 RX ADMIN — FOLIC ACID 1 MG: 1 TABLET ORAL at 08:43

## 2020-08-28 RX ADMIN — QUETIAPINE 25 MG: 25 TABLET ORAL at 07:24

## 2020-08-28 RX ADMIN — LACTULOSE 20 G: 10 SOLUTION ORAL at 07:24

## 2020-08-28 RX ADMIN — QUETIAPINE FUMARATE 50 MG: 50 TABLET ORAL at 20:15

## 2020-08-28 RX ADMIN — MULTIPLE VITAMINS W/ MINERALS TAB 1 TABLET: TAB at 08:43

## 2020-08-28 RX ADMIN — ACETAMINOPHEN 650 MG: 325 TABLET, FILM COATED ORAL at 18:17

## 2020-08-28 RX ADMIN — ACETAMINOPHEN 650 MG: 325 TABLET, FILM COATED ORAL at 07:41

## 2020-08-28 RX ADMIN — LACTULOSE 20 G: 10 SOLUTION ORAL at 20:14

## 2020-08-28 RX ADMIN — PANTOPRAZOLE SODIUM 40 MG: 40 TABLET, DELAYED RELEASE ORAL at 07:24

## 2020-08-28 RX ADMIN — TRAZODONE HYDROCHLORIDE 50 MG: 50 TABLET ORAL at 20:15

## 2020-08-28 RX ADMIN — RIFAXIMIN 550 MG: 550 TABLET ORAL at 20:14

## 2020-08-28 RX ADMIN — FLUTICASONE FUROATE AND VILANTEROL TRIFENATATE 1 PUFF: 200; 25 POWDER RESPIRATORY (INHALATION) at 08:46

## 2020-08-28 ASSESSMENT — ACTIVITIES OF DAILY LIVING (ADL)
ADLS_ACUITY_SCORE: 20
ADLS_ACUITY_SCORE: 19
ADLS_ACUITY_SCORE: 19
ADLS_ACUITY_SCORE: 20
ADLS_ACUITY_SCORE: 19
ADLS_ACUITY_SCORE: 20

## 2020-08-28 NOTE — PLAN OF CARE
Discharge Planner PT   Patient plan for discharge: Home to [facility]  Current status: Greeted patient IND ambulation in room. Engaged patient in ambulation out of room without assistive device at [facility] for a total of 400ft with mild path deviation & unsteadiness noted. Engaged patient in DGI balance assessment with patient scoring 15/24 - a score that is predictive of falls in older adults. Patient fatigued post ambulation & DGI assessment asking to rest. Concluded session with patient sitting up in chair. Room alarm returned to on upon leaving.   Barriers to return to prior living situation: decreased safety awareness/insight/judgement, impaired balance, fall risk, per OT assessment impaired cognition   Recommendations for discharge: TCU   Rationale for recommendations: Patient is below baseline for functional mobility & would benefit from continued physical therapy in the setting of a TCU for improving safety awareness/insight with functional mobility, improving balance and tolerance for functional activities in order to return to baseline of functioning.          Entered by: aPz Echols 08/28/2020 11:06 AM

## 2020-08-28 NOTE — PROGRESS NOTES
[facility]    Hospitalist Progress Note    Date of Service (when I saw the patient): 08/27/2020    Assessment & Plan   Abhijeet Mccauley is a 59 year old male with past medical history significant for severer alcohol use disorder, alcoholic cirrhosis, and COPD who presents with falls and altered mental status.     Interval History  Pt did well overnight and did not threaten to leave the hospital. He is doing ok this morning. He is feeling completely fine. Still upset about being in the hospital and wants to leave. Still doesn't understand the safety risk of him leaving given his underlying cognitive impairment.     Social work is still looking for placement.     Falls  Encephalopathy, likely metabolic and alcohol withdrawal and possible hepatic  Hospitalized 7/2020 for UTI. With increasing confusion and falls. Recent alcohol treatment 7/4-8/5, began drinking immediately upon discharge. Confused on presentation. With at least 2 falls. 4-5 beers (at least) daily.  On presentation confusion and lethargy. Lactic acid elevated at 2.6. ammonia 54. WBC 13. UA with large blood and large leuest. Etoh 0.03 on admission. CT abdomen with cirrhosis with varices, small volume ascites, R 7th and 10th rib fractures. CT head/ c-spine negative for acute fracture. he is afebrile and with no abdominal pain, making SBP unlikely. Acute encephalopathy has improved, now we are seeing underlying cognitive impairment. No further medical work-up indicated.   -8/13 blood cultures no growth  -urine culture negative  -ceftriaxone 1g IV q24 hours started 8/13, with a negative urine and little evidence of SBP antibiotics were stopped on 8/18.  Lactate levels are baseline  -HE management as below    Cognitive Impairment   Possible on-going Delirium   Pt's acute encephalopathy and underlying medical problems improved but still a bit confused and impulsive. I suspect there is underlying cognitive impairment likely from long-term  alcohol abuse. I am not sure how much more improvement we will see. In June of 2020, he scored 11/22 on a MOCA, and this hospitalization his SLUMS was 9/30.On 8/23 assessed by psychiatry and found to be non-decisional. Pt threatened to leave the hospital on 8/24, he was briefly placed on a 72 hour hold, which was discontinued on 8/25 by psychiatry after the patient agreed to stay voluntarily. SLUMS did improve to 14/30 on 8/26.  - increase seroquel 25 mg BID, + 50mg at bedtime   - Therapies (PT/SLP) all recommending TCU   - Continue 1:1  - Encouraged the patient to cooperate with transitioning to a TCU once medically stable. CD treatment would be more ideal afterwards unless he improves significantly prior to discharge.    Alcoholic cirrhosis  Concern for hepatic encephalopathy  PTA furosemide 20 mg daily, lactulose 20 gm TID, rifaximin 550 BID, spironolactone 25 mg daily  Longstanding history of alcohol use disorder with resultant cirrhosis. LFT's ok except alk phos (see below). No abdominal pain. Ammonia on admission at 54, improved 8/14 to 29. Total bili stabilized.   - lactulose 20 gm BID   - rifaximin 550 mg BID   -Prior to admission diuretics are on hold, he received a dose on 8/18 of Lasix, with good effect. Prior Cr 1.9-2 in 3/2019, actually better this admission and wonder if over diuresing prior.    Acute on chronic Normocytic Anemia  Known history of varices with banding. Reported hemoccult positive. Hemoglobin on admission was 6.6.  INR on admission is 1.45.    - transfused x 1 unit in ED, appreciate gastroenterology input, continue Protonix 40 mg IV twice daily   - pantoprazole 40 mg BID  -Hemoglobin is stable at ~9.3 8/26    Alcohol use disorder  H/o Etoh withdrawal seizures  Longstanding h/o Etoh use/ abuse. Multiple recent admissions for Etoh related issues. H/o Etoh w/d seizures.   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 Lino  Baseline creatinine appears to be in the 1.4-1.9 range as of recent, 1.68 on admission. Patient with CT on admission showing ureteric stent present.  - avoid nephrotoxins  - creatinine stable    Hypernatremia  Hypokalemia  Improved/ resolved    Respiratory distress 8/16 - resolved  Type II NSTEMI  Early am 8/16 with respiratory distress. Audible expiratory wheezes appreciated. Otherwise lung sounds clear. O2 sats 97% on 2L. Vitals otherwise stable. CXR clear, BNP elevated 5477. Troponin detectable at 0.070. VBG 7.35/36/30  -Patient did receive a dose of IV Lasix with significant diuresis, currently creatinine is stable  - repeat troponin am 8/18 0.027  - recent echo 6/2020 with EF 55-60, mild concentric LV hypertrophy.   - suspect underlying COPD, less likely acute HFpEF.  Chest x-ray was clear    Hypoglycemia  Resolved as PO intake improved    Elevated alk phos  Seen by GI inpatient 6/23. Suspected Alk phos elevation multifactorial, related to cirrhosis, recent fractures. At that time further workup  (EUS with liver biopsy) unlikely to  with ongoing active Etoh history and lack of follow up  - monitor for now    Right 10th posterior rib fracture  Patient with CT on admission showing comminuted segmental fracture of right seventh rib with bilateral rib fractures otherwise stable, as well as right posterior rib fracture with increased displacement.  -Monitor  -prn acetaminophen    History of AML  Diagnosed 2008, s/p chemotherapy, complete remission >10 years  -Monitor    COPD  - Continue  PTA inhaler Advair (Breo)  500-50 1 puff daily   - prn duonebs available    MAGALIE  Hold medications    FEN (fluids, electrolytes and nutrition): mechanical soft diet with thin liquids  Discussed with nursing.  DVT Prophylaxis: Pneumatic Compression Devices  Code Status: Full Code    Disposition: Expected discharge in 1-2 days pending psychiatry evaluation/placement     Yoselin Soto MD        -Data reviewed today: I reviewed all new labs and imaging results over the last 24 hours. I personally reviewed no images or EKG's today.    Physical Exam   Temp: 97.7  F (36.5  C) Temp src: Oral BP: (!) 158/82 Pulse: 89   Resp: 16 SpO2: 97 % O2 Device: None (Room air)    Vitals:    08/23/20 0526 08/24/20 0354 08/25/20 0615   Weight: 72.1 kg (158 lb 15.2 oz) 72.2 kg (159 lb 3.2 oz) 72.2 kg (159 lb 1.6 oz)     Vital Signs with Ranges  Temp:  [97.2  F (36.2  C)-98.2  F (36.8  C)] 97.7  F (36.5  C)  Pulse:  [80-89] 89  Resp:  [16-18] 16  BP: (138-158)/(68-82) 158/82  SpO2:  [97 %-99 %] 97 %  I/O last 3 completed shifts:  In: 400 [P.O.:400]  Out: -     Constitutional: Awake, alert, NAD  Respiratory: no increased work-of breathing   GI: non-distended  Skin/Integumen: No rashes, no cyanosis, no LE edema  Neuro : moving all 4 extremities, no focal deficits noted, but does show very little insight into situation or impairment.   Psych: Pleasant      Medications       fluticasone-vilanterol  1 puff Inhalation Daily     folic acid  1 mg Oral Daily     lactulose  20 g Oral BID     miconazole   Topical BID     multivitamin w/minerals  1 tablet Oral Daily     pantoprazole  40 mg Oral BID AC     QUEtiapine  25 mg Oral BID     QUEtiapine  50 mg Oral At Bedtime     rifaximin  550 mg Oral BID     sodium chloride (PF)  3 mL Intracatheter Q8H       Data   Recent Labs   Lab 08/26/20  0758 08/23/20  0800 08/22/20  0836   WBC 8.5  --   --    HGB 9.3* 9.8*  --    MCV 88  --   --      --   --     140 140   POTASSIUM 3.7 3.8 3.9    CHLORIDE 111* 114* 112*   CO2 19* 21 22   BUN 13 13 11   CR 1.55* 1.49* 1.70*   ANIONGAP 7 5 6   BEE 8.6 8.9 8.9   * 90 90   ALBUMIN 2.9*  --   --    PROTTOTAL 6.3*  --   --    BILITOTAL 0.9  --   --    ALKPHOS 358*  --   --    ALT 21  --   --    AST 33  --   --        No results found for this or any previous visit (from the past 24 hour(s)).

## 2020-08-28 NOTE — PROGRESS NOTES
D: NIKO following for discharge planning. SW reviewed chart. PT/OT recommend TCU. Pt wants to stay near his home in [place], [place].   I: Reviewed Medicare.gov for facilities near pt's home in [place], [place]. Referrals sent to:  [facility] -Will assess Mon but bed available is unknown at this time.  [facility]-does not accept pt's MA, agreed to cross-refer to the nearest facility in their network, [facility] in [place], however, they are full.  [facility]-assessing   [facility]-full  [facility]-full, but will assess for next week.  Updated sister Rowena. She asked why he couldn't come home with home care. She wonders if TCU would be beneficial as this hospital to TCU and back home cycle keeps happening. Pt told her he was discharging tomorrow. Explained referrals have been sent but nothing confirmed yet. Rowena reports she works from home right now and is around all day to check on him. She does not buy him alcohol but does not feel she can stop him from drinking. Friends will bring him to the liquor store. Case reviewed with Dr Riggs.    P: NIKO following for discharge planning.     [facility] Call, [facility]  [phone]  [facility]    Addendum: Pt accepted at [facility] TCU. Weekend phone, [phone], fax [phone]. Spoke again with Dr Riggs who asked SW to present pt with the option to discharge to TCU vs home with home. NIKO met with pt. Discussed his options. He would like to discharge home with home care. Does not want to return to TCU. He reports he is working with a [facility] SW on housing and was supposed to hear by the end of August on options. Anticipate discharge Saturday. Sister Rowena in agreement. Pt uses [facility]. His friend Marc will most likely transport him home.

## 2020-08-28 NOTE — PROGRESS NOTES
Patient is Open to HHRN/PT/OT thru [facility]. Anticipate discharge 8/29. Writer called to update Leah  ([facility] @ [phone])  Upon discharge FAX orders to [phone]

## 2020-08-28 NOTE — PROGRESS NOTES
Clinic Care Coordination Contact    Situation: Patient chart reviewed by care coordinator.    Background: Patient was identified as potential enrollee for care coordination. He is currently also being followed by RN CC.     Assessment: Patient is getting home care services by [employer] therefore he does not currently  Qualify for Care Coordination services.    Plan/Recommendations: Once he graduates from [employer], [facility] will outreach in anticipation of enrolling him into care coordination.    RUDY Allen     [facility] Coordination  [facility]  Tel: [phone]

## 2020-08-28 NOTE — PLAN OF CARE
DATE & TIME: 8/27 2300-0730  Cognitive Concerns/Orientation: AOx3, disoriented to time   BEHAVIOR & AGGRESSION TOOL COLOR: Green  CIWA SCORE: NA  ABNL VS/O2: VSS on RA  MOBILITY: Up IND in the room  PAIN MANAGMENT: Denied pain  DIET: Regular diet  BOWEL/BLADDER: Continent   ABNL LAB/BG: n/a  DRAIN/DEVICES: No IV access, MD aware  TELEMETRY RHYTHM: NA  SKIN: bruises and scabs scattered  D/C DAY/GOALS/PLACE: Pending placement at CD facility close to home  OTHER IMPORTANT INFO: Voluntary - holdable if needed, door alarm on and in place  COMMIT TO SIT DONE AND SIGNED OFF: yes

## 2020-08-28 NOTE — PROGRESS NOTES
SW:Discharge Planning to TCU.  D:  Per Dr Soto, patient is medically stable for TCU.  Because a door alarm is being used,discharge may a barrier. Referrals to a secure memory care TCU or secure memory care LTC unit where he can receive therapy are likely the only options at this time.   CD assessment cannot occur until his cognition improves.  His sister does wish to be involved in the discharge planning.    Friday NIKO will begin TCU referral process.

## 2020-08-28 NOTE — PLAN OF CARE
DATE & TIME: 8/27 1900-2330  Cognitive Concerns/Orientation: AOx3, disoriented to time   BEHAVIOR & AGGRESSION TOOL COLOR: Green  CIWA SCORE: NA  ABNL VS/O2: VSS on RA  MOBILITY: Up IND in the room  PAIN MANAGMENT: denied pain  DIET: Regular diet, good appetite  BOWEL/BLADDER: Continent   ABNL LAB/BG: n/a  DRAIN/DEVICES: No IV access, MD OK  TELEMETRY RHYTHM: NA  SKIN: bruises and scabs scattered  D/C DAY/GOALS/PLACE: Pending placement at CD facility close to home  OTHER IMPORTANT INFO: Voluntary - holdable if needed, door alarm on and in place

## 2020-08-28 NOTE — PLAN OF CARE
DATE & TIME: 8/28 7451-6237  Cognitive Concerns/Orientation: AOx4  BEHAVIOR & AGGRESSION TOOL COLOR: Green, calm/cooperative  CIWA SCORE: NA  ABNL VS/O2: VSS on RA  MOBILITY: Up IND in the room  PAIN MANAGMENT: 8/10 HA this am, tylenol given and helpful   DIET: Regular diet, great PO  BOWEL/BLADDER: Continent, BMX3 on lactulose    ABNL LAB/BG: n/a  DRAIN/DEVICES: No IV access, MD aware  TELEMETRY RHYTHM: NA  SKIN: bruises and scabs scattered  D/C DAY/GOALS/PLACE: discharge home with home care tomorrow  OTHER IMPORTANT INFO: Voluntary - door alarm on and in place  COMMIT TO SIT DONE AND SIGNED OFF: yes

## 2020-08-28 NOTE — PROGRESS NOTES
08/28/20 1050   Signing Clinician's Name / Credentials   Signing clinician's name / credentials Paz Echols, PT, DPT   Dynamic Gait Index (Antelmo and Lui Audra, 1995)   Gait Level Surface 2   Change in Gait Speed 1   Gait and Horizontal Head Turns 2   Gait with Vertical Head Turns 2   Gait and Pivot Turns 2   Step Over Obstacle 2   Step Around Obstacles 2   Steps 2   Total Dynamic Gait Index Score  (A score of 19 or less has been correlated to an increased risk of falls in community dwelling older adults, patients with vestibular disorders, and patients with MS.)   Total Score (out of 24) 15

## 2020-08-28 NOTE — PROGRESS NOTES
[facility]  Hospitalist Progress Note  Name: Abhijeet Mccauley    MRN: [id number]  Physician:  Miky Riggs DO, FHM (Text Page)    Summary of Stay: Abhijeet Mccauley is a 59 year old male with past medical history significant for severer alcohol use disorder, alcoholic cirrhosis, and COPD who presents with falls and altered mental status.  He has had a prolonged hospital stay and has made gradual improvement in mentation.    Assessment & Plan    Falls  Encephalopathy, likely metabolic and alcohol withdrawal and possible hepatic  Alcohol abuse/cirrhosis  Deconditioning:  Hospitalized 7/2020 for UTI. With increasing confusion and falls. Recent alcohol treatment 7/4-8/5, began drinking immediately upon discharge. Confused on presentation. With at least 2 falls. 4-5 beers (at least) daily.  On presentation confusion and lethargy. Lactic acid elevated at 2.6. ammonia 54. WBC 13. UA with large blood and large leuest. Etoh 0.03 on admission. CT abdomen with cirrhosis with varices, small volume ascites, R 7th and 10th rib fractures. CT head/ c-spine negative for acute fracture. he is afebrile and with no abdominal pain, making SBP unlikely. Acute encephalopathy has improved, now we are seeing underlying cognitive impairment. No further medical work-up indicated.   -8/13 blood cultures no growth  -urine culture negative  -ceftriaxone 1g IV q24 hours started 8/13, with a negative urine and little evidence of SBP antibiotics were stopped on 8/18.  Lactate levels are baseline  -HE management as below    -  Withdrawal resolved.  Mentation has gradually improved during stay.  Pt threatened to leave the hospital on 8/24, he was briefly placed on a 72 hour hold, which was discontinued on 8/25 by psychiatry after the patient agreed to stay voluntarily. SLUMS did improve to 14/30 on 8/26.  Discussed case with psychiatrist and he does not feel the patient is appropriate for a hold/commitment at this point given  improvement.  Also spoke with his sister who has been conversing with him by phone and feels his mentation is better and back near baseline.  Tolerance with therapy has also improved.  Discussed TCU vs home with him.  TCU had been recommended but patient wants to d/c home and is not willing to go to TCU.  He is open to further home care.  He is long term interested in outpatient CD tx but not interested in inpt treatment.  Tentative plan for d/c home tomorrow with home care and family support.    Hepatic encephalopathy earlier in stay:  PTA furosemide 20 mg daily, lactulose 20 gm TID, rifaximin 550 BID, spironolactone 25 mg daily  Longstanding history of alcohol use disorder with resultant cirrhosis. LFT's ok except alk phos (see below). No abdominal pain. Ammonia on admission at 54, improved 8/14 to 29. Total bili stabilized.   - lactulose 20 gm BID   - rifaximin 550 mg BID   -. Prior Cr 1.9-2 in 3/2019, actually better this admission and wonder if over diuresing prior.    Acute on chronic Normocytic Anemia  Known history of varices with banding. Reported hemoccult positive. Hemoglobin on admission was 6.6.  INR on admission is 1.45.    - transfused x 1 unit in ED, appreciate gastroenterology input, continue Protonix 40 mg IV twice daily   - pantoprazole 40 mg BID  -Hemoglobin is stable at 9 range.  No overt bleeding currently.    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 Uriosteguiisen  Baseline creatinine appears to be in the 1.4-1.9 range as of recent, 1.68 on admission. Patient with CT on admission showing ureteric stent present.  - avoid nephrotoxins  - creatinine stable  -  He had follow-up scheduled with Dr. Galeana ([facility]) but missed with recent repeat hospital stay/prior discharge.  I have asked CM/SW to schedule.  Stent follow-up needs to occur outpatient in the near future.         COVID Status:  COVID-19 PCR Results    COVID-19 PCR Results  5/27/20 6/12/20 6/12/20 7/17/20 7/21/20 8/13/20     1520 1520      COVID-19 Virus (Coronavirus) PCR - [facility] Result     Negative for SARS-CoV-2 RNA.    COVID-19 Virus PCR to [facility] - Result Not Detected Test received-See reflex to IDDL test SARS CoV2 (COVID-19) Virus RT-PCR  Not Detected  Not Detected   COVID-19 Virus PCR to [facility] - Source Nasopharyngeal Nasopharyngeal  Nasopharyngeal  Nasopharyngeal   SARS-CoV-2 Virus Specimen Source   Nasopharyngeal      SARS-CoV-2 PCR Result   NEGATIVE         Comments are available for some flowsheets but are not being displayed.         COVID-19 Antibody Results, Testing for Immunity    COVID-19 Antibody Results, Testing for Immunity   No data to display.            Diet: Snacks/Supplements Adult: Other; B - gelatein high protein jello, L - HT smoothie, D - magic shake (RD); With Meals  Regular Diet Adult    DVT Prophylaxis: Pneumatic Compression Devices, ambulation  Chong Catheter: not present  Code Status: Full Code      Disposition Plan   Expected discharge tomorrow to home with home care.     Entered: Miky Riggs 08/28/2020, 3:56 PM       Interval History   Assumed care, history reviewed.  Mr. Mccauley feels well.  No current pain, sob, nausea.  RN reports he is much more independent now in room.  His mentation per staff has also steadily improved.    -Data reviewed today: I reviewed all new labs and imaging reports over the last 24 hours. I personally reviewed no images or EKG's today.    Physical Exam   Temp: 97.5  F (36.4  C) Temp src: Oral BP: (!) 145/72 Pulse: 93   Resp: 18 SpO2: 97 % O2 Device: None (Room air)    Vitals:    08/24/20 0354 08/25/20 0615 08/28/20 0500   Weight: 72.2 kg (159 lb 3.2 oz) 72.2 kg (159 lb 1.6 oz) 72.4 kg (159 lb 9.8 oz)     Vital Signs with Ranges  Temp:  [97.3  F (36.3  C)-98  F (36.7  C)] 97.5  F (36.4  C)  Pulse:  [84-93] 93  Resp:  [16-20] 18  BP: (122-158)/(47-82) 145/72  SpO2:  [96 %-98 %] 97 %  I/O last 3 completed shifts:  In:  480 [P.O.:480]  Out: -       GEN:  Alert, oriented x 3, forgetful with some details, appears comfortable, no overt distress.  HEENT:  Normocephalic/atraumatic, no scleral icterus, no nasal discharge, mouth moist.  CV:  Regular rate and rhythm, no murmur or JVD.  LUNGS:  Clear to auscultation bilaterally without rales/rhonchi/wheezing/retractions.  Symmetric chest rise on inhalation noted.  ABD:  Active bowel sounds, soft, non-tender/non-distended.  No rebound/guarding/rigidity.  EXT:  No edema.  No cyanosis.  No acute joint synovitis noted.  SKIN:  Dry to touch, no exanthems noted in the visualized areas.    Medications       fluticasone-vilanterol  1 puff Inhalation Daily     folic acid  1 mg Oral Daily     lactulose  20 g Oral BID     miconazole   Topical BID     multivitamin w/minerals  1 tablet Oral Daily     pantoprazole  40 mg Oral BID AC     QUEtiapine  25 mg Oral BID     QUEtiapine  50 mg Oral At Bedtime     rifaximin  550 mg Oral BID     sodium chloride (PF)  3 mL Intracatheter Q8H     Data     Recent Labs   Lab 08/26/20  0758 08/23/20  0800   WBC 8.5  --    HGB 9.3* 9.8*   HCT 30.6*  --    MCV 88  --      --        Recent Labs   Lab 08/26/20  0758 08/23/20  0800 08/22/20  0836    140 140   POTASSIUM 3.7 3.8 3.9   CHLORIDE 111* 114* 112*   CO2 19* 21 22   ANIONGAP 7 5 6   * 90 90   BUN 13 13 11   CR 1.55* 1.49* 1.70*   GFRESTIMATED 47* 50* 42*   GFRESTBLACK 55* 58* 49*   BEE 8.6 8.9 8.9   PROTTOTAL 6.3*  --   --    ALBUMIN 2.9*  --   --    BILITOTAL 0.9  --   --    ALKPHOS 358*  --   --    AST 33  --   --    ALT 21  --   --       Ref. Range 8/13/2020 16:20 8/13/2020 17:40 8/13/2020 17:40   Specimen Description Unknown Midstream Urine Blood Left Hand Blood Left Arm   Culture Micro Unknown <10,000 colonies/... No growth No growth       No results found for this or any previous visit (from the past 24 hour(s)).

## 2020-08-29 ENCOUNTER — TELEPHONE (OUTPATIENT)
Dept: UROLOGY | Facility: CLINIC | Age: 60
End: 2020-08-29

## 2020-08-29 VITALS
TEMPERATURE: 97.3 F | RESPIRATION RATE: 16 BRPM | DIASTOLIC BLOOD PRESSURE: 74 MMHG | OXYGEN SATURATION: 96 % | BODY MASS INDEX: 23.57 KG/M2 | WEIGHT: 159.61 LBS | SYSTOLIC BLOOD PRESSURE: 140 MMHG | HEART RATE: 90 BPM

## 2020-08-29 PROCEDURE — 25000132 ZZH RX MED GY IP 250 OP 250 PS 637: Performed by: INTERNAL MEDICINE

## 2020-08-29 PROCEDURE — 99239 HOSP IP/OBS DSCHRG MGMT >30: CPT | Performed by: INTERNAL MEDICINE

## 2020-08-29 PROCEDURE — 25000132 ZZH RX MED GY IP 250 OP 250 PS 637: Performed by: PHYSICIAN ASSISTANT

## 2020-08-29 PROCEDURE — 25000132 ZZH RX MED GY IP 250 OP 250 PS 637: Performed by: PSYCHIATRY & NEUROLOGY

## 2020-08-29 PROCEDURE — 25000132 ZZH RX MED GY IP 250 OP 250 PS 637: Performed by: HOSPITALIST

## 2020-08-29 RX ORDER — LACTULOSE 20 G/30ML
30 SOLUTION ORAL 2 TIMES DAILY
Qty: 946 ML | Refills: 0 | Status: SHIPPED | OUTPATIENT
Start: 2020-08-29 | End: 2021-04-15

## 2020-08-29 RX ORDER — POLYETHYLENE GLYCOL 3350 17 G/17G
17 POWDER, FOR SOLUTION ORAL 2 TIMES DAILY PRN
COMMUNITY
Start: 2020-08-29 | End: 2021-04-15

## 2020-08-29 RX ORDER — QUETIAPINE FUMARATE 50 MG/1
50 TABLET, FILM COATED ORAL AT BEDTIME
Qty: 30 TABLET | Refills: 0 | Status: SHIPPED | OUTPATIENT
Start: 2020-08-29 | End: 2020-09-22

## 2020-08-29 RX ORDER — QUETIAPINE FUMARATE 25 MG/1
25 TABLET, FILM COATED ORAL EVERY MORNING
Qty: 30 TABLET | Refills: 0 | Status: SHIPPED | OUTPATIENT
Start: 2020-08-29 | End: 2020-09-22

## 2020-08-29 RX ADMIN — FLUTICASONE FUROATE AND VILANTEROL TRIFENATATE 1 PUFF: 200; 25 POWDER RESPIRATORY (INHALATION) at 07:29

## 2020-08-29 RX ADMIN — RIFAXIMIN 550 MG: 550 TABLET ORAL at 07:23

## 2020-08-29 RX ADMIN — LACTULOSE 20 G: 10 SOLUTION ORAL at 07:24

## 2020-08-29 RX ADMIN — PANTOPRAZOLE SODIUM 40 MG: 40 TABLET, DELAYED RELEASE ORAL at 07:24

## 2020-08-29 RX ADMIN — MULTIPLE VITAMINS W/ MINERALS TAB 1 TABLET: TAB at 07:24

## 2020-08-29 RX ADMIN — QUETIAPINE 25 MG: 25 TABLET ORAL at 07:24

## 2020-08-29 RX ADMIN — ACETAMINOPHEN 650 MG: 325 TABLET, FILM COATED ORAL at 06:15

## 2020-08-29 RX ADMIN — FOLIC ACID 1 MG: 1 TABLET ORAL at 07:24

## 2020-08-29 ASSESSMENT — ACTIVITIES OF DAILY LIVING (ADL)
ADLS_ACUITY_SCORE: 19
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
## 2020-08-29 NOTE — DISCHARGE INSTRUCTIONS
If an appointment was recommended for you please call [facility] to schedule. Be aware, DUE TO COVID-19 PANDEMIC, MANY CLINICS ARE TEMPORARILY NOT TAKING IN-PERSON APPOINTMENTS. The clinic may schedule you for a virtual or phone visit--if this is the case, please answer your phone. If you develop any symptoms or have any followup questions please call your primary care clinic. If it is an emergency, please dial 911.      It is very important to check in with your provider--during a phone or clinic visit, talk about your hospital stay.  Tell the clinic provider how you feel.  Talk about the medications listed on the discharge papers.  Your doctor will update records, make sure you are still doing OK, and decide if any tests or medication changes are needed.        [facility]: [phone]  Your discharge address: [address] [place] [address]      Primary Care - your primary care provider is listed below,  Cihdi Rivero ([phone])   [address] [place] [address]        Urology Follow Up  We were unable to schedule an appointment for you due to Urology offices are closed on the weekend.     Urology at [facility]  Appointments / Information: [phone]  Address: [address] [place] [address]. [place] [address]  *please call Monday to confirm       Chemical Dependency Followup - two options for scheduling an evaluation and followup care:   1) on Monday, call Behavioral Outpatient Intake [phone], or   2)  self-schedule through the [facility] application, at any time for next available appt; please follow prompts for  substance use .      Psychiatry Followup   You need to schedule with a psychiatrist.  One option is with a [facility] provider.  To set up a [facility] intake consult, please call:   Silvina [place], Phone: [phone]  Address: [address], [place] [address]  * if you prefer a different psychiatry  group, please work with your Primary Care Provider and Home Care nurse for referrals to locations near you     Pt's ring locked with security, envelop number [id number]

## 2020-08-29 NOTE — PLAN OF CARE
DATE & TIME: 8/28 from 1900 to 0730    Cognitive Concerns/ Orientation : A&O x 4   BEHAVIOR & AGGRESSION TOOL COLOR: Green  CIWA SCORE: N/A  ABNL VS/O2: VSS on RA  MOBILITY: Up x independentt to bathroom  PAIN MANAGMENT: Denied  DIET: Regular  BOWEL/BLADDER: Continent  ABNL LAB/BG: N/A  DRAIN/DEVICES: No IV access, MD is aware  TELEMETRY RHYTHM: N/A  SKIN: Bruised, groin area reddened, applied miconazole powder  TESTS/PROCEDURES: N/A  D/C DAY/GOALS/PLACE: Anticipated discharge ro home with home care 8/29 before 11:00 am  OTHER IMPORTANT INFO:   MD/RN ROUNDING SIGNED OFF D/E SHIFT: N/A  COMMIT TO SIT DONE AND SIGNED OFF Yes

## 2020-08-29 NOTE — CONSULTS
"MD wrote on discharge orders,     1.  Follow-up primary care provider within 5 days.  Recommend basic metabolic panel day of follow-up.    2.  Follow-up Urology within 2 weeks -  assisting in setting up appointment.  3.  Follow-up with Psychiatry within next month -  to assist in setting up appointment    Unfortunately there are no persons at the Specialty Clinics available to provide an appointment due today being Saturday (no weekend clinic hours), and Inpatient [facility] Care Coordinator does not have access to clinic scheduling.  Thus, IP Care Coordination provided information on how to reach clinics, and modified the discharge order to include the following note,     PLEASE NOTE: specialty clinics do not have weekend schedulers available, thus your Inpatient  is unable to secure appointments on the weekend.  It will be your responsibility to call to schedule these appointments.  Please see your \"Discharge Instructions\" after the med list for how to contact these providers.  Please ask your Primary Care Provider or Home Care Team if you need help with obtaining an appointment.  Thank you!     Inpatient RN Care Coordinator provided the following information in the \"discharge instructions\" section of the discharge papers, for pt/sister reference.      Home Care   [facility]: [phone]  Your discharge address: [address] [place] [address]      Primary Care - your primary care provider is listed below,  Chidi Rivero ([phone])   [address] [place] [address]      Urology Follow Up  We were unable to schedule an appointment for you due to Urology offices are closed on the weekend.   Urology at [facility]  Appointments / Information: [phone]  Address: [address] [place] [address]. [place] [address]  *please call Monday to confirm appointment       Chemical Dependency Followup - two options for " scheduling an evaluation and followup care:   1) on Monday, call Behavioral Outpatient Intake [phone], or   2)  self-schedule through the [employer] application, at any time for next available appt; please follow prompts for  substance use .      Psychiatry Followup   You need to schedule with a psychiatrist.  One option is with a [facility] provider.  To set up a [facility] intake consult, please call:   Silvina [place], Phone: [phone]  Address: [address], [place] [address]  * if you prefer a different psychiatry group, please work with your Primary Care Provider and Home Care nurse for referrals to locations near you      Inpatient Care Coordinator RN called pt's sister to confirm the above discharge address was correct.  Inpatient Care Coordinator RN faxed discharge orders to [facility].     ADDENDUM: HANDOFF SENT TO PCP CLINIC,    IP Care Coordination consult was placed after-hours on Friday, in which MD asked Care Cordinator to schedule Urology followup (has a stone/stent).  Saturday AM this could not be done due to it being a weekend and no Urology schedulers can be reached.  Pt previously was to see a provider 7/21 at the [facility] Dr. Hale [phone] but was inpatient at that time; unclear if they ever able to reach him.  Patient has a SLUMS score of 9-14/30, please call his sister for appointments!  He is discharging to his sister's home: [address] [place] [address].  I wonder if he could be seen by Urology in [place]?  I asked a weekend PCP  to send a message to the [place] Urology team and primary care scheduling to contact the sister.      Key Recommendations:  MUST have help scheduling these appointments and the sister (Rowena, [phone]) needs to know the dates / times, cannot complete as inpatient these as the request occurred on a weekend and unable to schedule appointments due to specialty clinic hours do not include weekends---sending  handoff to clinic care coordination to please continue to help in outpatient setting.
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
## 2020-08-29 NOTE — TELEPHONE ENCOUNTER
Reason for call:  Other   Patient called regarding (reason for call): Other  Additional comments: Needs stent- Hospital discharged and needs follow up with primary. Encephalopaphy.    Phone number to reach patient:  Other phone number:  Sister Rowena [phone] (W)    Best Time:  Anytime     Can we leave a detailed message on this number?  YES    Travel screening: Not Applicable

## 2020-08-31 ENCOUNTER — TELEPHONE (OUTPATIENT)
Dept: UROLOGY | Facility: CLINIC | Age: 60
End: 2020-08-31

## 2020-08-31 ENCOUNTER — PATIENT OUTREACH (OUTPATIENT)
Dept: CARE COORDINATION | Facility: CLINIC | Age: 60
End: 2020-08-31

## 2020-08-31 ASSESSMENT — ACTIVITIES OF DAILY LIVING (ADL): DEPENDENT_IADLS:: TRANSPORTATION

## 2020-08-31 NOTE — TELEPHONE ENCOUNTER
"Called and inquired what stage of treat are they in. The msg says \"encephalopathy\" and see PCP.  Unclear what is needed as far as Urology.    Reminded that they need to follow up with their Urologist however.    Carmina LEE   Specialty Clinic RN  "

## 2020-08-31 NOTE — PROGRESS NOTES
"Clinic Care Coordination Contact    Clinic Care Coordination Contact  OUTREACH    Referral Information:  Referral Source: IP Handoff    Primary Diagnosis: Genitourinary Disorders    Chief Complaint   Patient presents with     Clinic Care Coordination - Post Hospital     RN         [place] Utilization:   Clinic Utilization  Difficulty keeping appointments:: Yes  Compliance Concerns: Yes  No-Show Concerns: Yes  No PCP office visit in Past Year: No  Utilization    Last refreshed: 8/31/2020  9:04 AM:  Hospital Admissions 5           Last refreshed: 8/31/2020  9:04 AM:  ED Visits 5           Last refreshed: 8/31/2020  9:04 AM:  No Show Count (past year) 10              Current as of: 8/31/2020  9:04 AM              Clinical Concerns:  Current Medical Concerns: Patient was admitted at [facility] from 8/13 to 8/29/20 with diagnosis of 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  He was at [facility] after hospitalization in July and it is unclear from chart review when patient left TCU however EMS brought him to ED from home on 8/13/20.     CTS handoff was sent indicating concern/request regarding follow up appointments.  Patient needing close Urology follow up after having stent placed in June and missing Urology appt in July due to hospitalization.       RN CC made outreach to patient and he was very angry and upset, stating all he wants to do is schedule Urology follow up and \"you people at [place] are giving me the run around.\" When writer tried to apologize for any confusion he responded, \"don't apologize and act like you know what I'm going through.\"  Tried to explain that I could see open telephone encounter to [place] Urology nursing to discuss if his visit could be completed at [facility] but patient wouldn't listen and would interrupt.   Explained that he could contact Urology clinic at [facility] since that his where his July appt had been originally scheduled and his " surgery had been performed during admission at [facility].   Patient slightly calmed down and RN CC provided [facility] Urology clinic's phone number. He repeated the phone number accurately.     No additional post-hospital questions discussed as patient was too upset and only focused on speaking to someone who could schedule an appt in Urology.  Remainder of this assessment was conducted from chart review and previous [facility] notes.    Current Behavioral Concerns: did not assess      Education Provided to patient: no education completed due to above      Health Maintenance Reviewed:    Clinical Pathway: None    Medication Management:  Unable to perform medication reconciliation due to conversation detailed above     Functional Status:  Dependent ADLs:: Ambulation-walker  Dependent IADLs:: Transportation  Bed or wheelchair confined:: Yes  Mobility Status: Independent w/Device    Living Situation:  Current living arrangement:: I live alone  Type of residence:: Apartment    Lifestyle & Psychosocial Needs:        Diet:: Regular  Inadequate nutrition (GOAL):: No  Tube Feeding: No  Inadequate activity/exercise (GOAL):: No  Significant changes in sleep pattern (GOAL): No  Transportation means:: Medical transport     Catholic or spiritual beliefs that impact treatment:: No  Mental health DX:: Yes  Mental health management concern (GOAL):: No  Informal Support system:: Family, Other   Socioeconomic History     Marital status: Single     Spouse name: Not on file     Number of children: Not on file     Years of education: Not on file     Highest education level: Not on file     Tobacco Use     Smoking status: Current Every Day Smoker     Packs/day: 0.50     Years: 30.00     Pack years: 15.00     Types: Cigarettes     Smokeless tobacco: Never Used     Tobacco comment: 5 cigarettes a day    Substance and Sexual Activity     Alcohol use: Yes     Comment: Down to 3 beers per day.  Sometimes a cocktail also.     Drug use: Yes     Types:  Marijuana             Resources and Interventions:  Current Resources: Patient was resuming services through [facility] per chart review  List of home care services:: Skilled Nursing, Physicial Therapy, Occupational Therapy  Community Resources: Home Care, County Worker  Supplies used at home:: Compression Stockings  Equipment Currently Used at Home: walker, rolling(2WW or 4WW)    Advance Care Plan/Directive  Advanced Care Plans/Directives on file:: No  Advanced Care Plan/Directive Status: Not Applicable    Patient/Caregiver understanding: Patient verbalized phone number to [facility] to schedule follow up.        Future Appointments              In 2 weeks Chidi Valenzuela MD [facility]  Arrive at: Clinic A          Plan: Patient was not receptive to speaking to RN Care Coordinator any further.  SW Care Coordinator has been previously following due to known alcohol use disorder and recurrent hospitalizations for health complications related to alcohol use.  RN CC will route to SW CC to determine further outreach plans.     LEONARD TrivediN, RN   [facility]  - Clinic Care Coordinator

## 2020-08-31 NOTE — TELEPHONE ENCOUNTER
[facility] Call Center    Phone Message    May a detailed message be left on voicemail: yes     Reason for Call: Other: Abhijeet calling to schedule an appointment to be seen for kidney stones, and also for an issue he is having controlling his urine. Abhijeet says that the [facility] left a piece of the catheter in his penis, and now he is unable to control when he urinates. Abhijeet is very upset and frustrated with this. Abhijeet was scheduled to see Dr. Hale back in July, but that appointment was cancelled. Abhijeet wants an in clinic appointment, but is not willing to wait until October when the next in clinic appointment was available. Abhijeet is fine with cancelling other people's appointments, because he said he should be the number one on our list. Please give Abhijeet a call back to discuss options at your earliest convenience.     Action Taken: Message routed to:  [facility]  Uro    Travel Screening: Not Applicable

## 2020-08-31 NOTE — PLAN OF CARE
Physical Therapy Discharge Summary    Reason for therapy discharge:    Discharged to home.    Progress towards therapy goal(s). See goals on Care Plan in [facility] electronic health record for goal details.  Goals partially met.  Barriers to achieving goals:   discharge from facility.    Therapy recommendation(s):    Continued therapy is recommended.  Rationale/Recommendations:  Patient is below baseline for functional mobility & would benefit from continued physical therapy in the setting of a TCU for improving safety awareness/insight with functional mobility, improving balance and tolerance for functional activities in order to return to baseline of functioning. .

## 2020-08-31 NOTE — TELEPHONE ENCOUNTER
Reason for Call:  Other appointment    Detailed comments: Pt was told to make appointment for urology because of cath. And kidney stones, no openings, please call    Phone Number Patient can be reached at: Home number on file [phone] (home)[phone] cell    Best Time: today    Can we leave a detailed message on this number? YES    Call taken on 8/31/2020 at 3:00 PM by Maida Allison

## 2020-08-31 NOTE — TELEPHONE ENCOUNTER
[facility] not managing stones.  Pt sister was advised this earlier today.  RN does see a msg in for Alexia appt scheduling to get pt in for appt.      Carmina LEE   [facility]

## 2020-08-31 NOTE — TELEPHONE ENCOUNTER
Call placed to advise pt of need to continue attempts at appt with Alexia at [facility] due to in kidney stone and is a staghorn stone.    Reached Sister again and clarified once again that he needs to see [facility].    Carmina LEE   Specialty Clinic RN

## 2020-08-31 NOTE — PLAN OF CARE
Occupational Therapy Discharge Summary    Reason for therapy discharge:    Discharged to home.    Progress towards therapy goal(s). See goals on Care Plan in [facility] electronic health record for goal details.  Goals partially met.  Barriers to achieving goals:   discharge from facility.    Therapy recommendation(s):    Patient had been recommended to discharge to TCU.  Pt continues to be limited by impaired cognition, safety, and balance

## 2020-09-02 ENCOUNTER — TELEPHONE (OUTPATIENT)
Dept: FAMILY MEDICINE | Facility: CLINIC | Age: 60
End: 2020-09-02

## 2020-09-02 ENCOUNTER — MEDICAL CORRESPONDENCE (OUTPATIENT)
Dept: HEALTH INFORMATION MANAGEMENT | Facility: CLINIC | Age: 60
End: 2020-09-02

## 2020-09-03 ENCOUNTER — TRANSFERRED RECORDS (OUTPATIENT)
Dept: HEALTH INFORMATION MANAGEMENT | Facility: CLINIC | Age: 60
End: 2020-09-03

## 2020-09-03 ENCOUNTER — TELEPHONE (OUTPATIENT)
Dept: FAMILY MEDICINE | Facility: CLINIC | Age: 60
End: 2020-09-03

## 2020-09-03 ENCOUNTER — MEDICAL CORRESPONDENCE (OUTPATIENT)
Dept: HEALTH INFORMATION MANAGEMENT | Facility: CLINIC | Age: 60
End: 2020-09-03

## 2020-09-04 ENCOUNTER — ANCILLARY PROCEDURE (OUTPATIENT)
Dept: GENERAL RADIOLOGY | Facility: CLINIC | Age: 60
End: 2020-09-04
Attending: NURSE PRACTITIONER
Payer: COMMERCIAL

## 2020-09-04 ENCOUNTER — OFFICE VISIT (OUTPATIENT)
Dept: FAMILY MEDICINE | Facility: CLINIC | Age: 60
End: 2020-09-04
Payer: COMMERCIAL

## 2020-09-04 ENCOUNTER — PATIENT OUTREACH (OUTPATIENT)
Dept: CARE COORDINATION | Facility: CLINIC | Age: 60
End: 2020-09-04

## 2020-09-04 VITALS
OXYGEN SATURATION: 99 % | TEMPERATURE: 97.7 F | HEIGHT: 66 IN | SYSTOLIC BLOOD PRESSURE: 144 MMHG | HEART RATE: 90 BPM | RESPIRATION RATE: 18 BRPM | DIASTOLIC BLOOD PRESSURE: 72 MMHG | WEIGHT: 178 LBS | BODY MASS INDEX: 28.61 KG/M2

## 2020-09-04 DIAGNOSIS — M25.552 HIP PAIN, LEFT: ICD-10-CM

## 2020-09-04 DIAGNOSIS — N30.01 ACUTE CYSTITIS WITH HEMATURIA: Primary | ICD-10-CM

## 2020-09-04 DIAGNOSIS — R82.90 NONSPECIFIC FINDING ON EXAMINATION OF URINE: ICD-10-CM

## 2020-09-04 LAB
ALBUMIN UR-MCNC: 30 MG/DL
APPEARANCE UR: ABNORMAL
BACTERIA #/AREA URNS HPF: ABNORMAL /HPF
BILIRUB UR QL STRIP: NEGATIVE
COLOR UR AUTO: YELLOW
GLUCOSE UR STRIP-MCNC: NEGATIVE MG/DL
HGB UR QL STRIP: ABNORMAL
KETONES UR STRIP-MCNC: NEGATIVE MG/DL
LEUKOCYTE ESTERASE UR QL STRIP: ABNORMAL
NITRATE UR QL: NEGATIVE
NON-SQ EPI CELLS #/AREA URNS LPF: ABNORMAL /LPF
PH UR STRIP: 5.5 PH (ref 5–7)
RBC #/AREA URNS AUTO: >100 /HPF
SOURCE: ABNORMAL
SP GR UR STRIP: 1.01 (ref 1–1.03)
UROBILINOGEN UR STRIP-ACNC: 0.2 EU/DL (ref 0.2–1)
WBC #/AREA URNS AUTO: >100 /HPF
YEAST #/AREA URNS HPF: ABNORMAL /HPF

## 2020-09-04 PROCEDURE — 87186 SC STD MICRODIL/AGAR DIL: CPT | Performed by: NURSE PRACTITIONER

## 2020-09-04 PROCEDURE — 99214 OFFICE O/P EST MOD 30 MIN: CPT | Performed by: NURSE PRACTITIONER

## 2020-09-04 PROCEDURE — 87088 URINE BACTERIA CULTURE: CPT | Performed by: NURSE PRACTITIONER

## 2020-09-04 PROCEDURE — 81001 URINALYSIS AUTO W/SCOPE: CPT | Performed by: NURSE PRACTITIONER

## 2020-09-04 PROCEDURE — 87086 URINE CULTURE/COLONY COUNT: CPT | Performed by: NURSE PRACTITIONER

## 2020-09-04 PROCEDURE — 73502 X-RAY EXAM HIP UNI 2-3 VIEWS: CPT

## 2020-09-04 ASSESSMENT — MIFFLIN-ST. JEOR: SCORE: 1555.15

## 2020-09-04 NOTE — PROGRESS NOTES
Clinic Care Coordination Contact  [facility]/Voicemail       Clinical Data: Care Coordinator Outreach  Outreach attempted x 1.  Left message on patient's voicemail with call back information and requested return call.  Plan: Care Coordinator will try to reach patient again in 3-5 business days.    Aleta German [facility] Care Coordination  [facility]  Tel: [phone]

## 2020-09-04 NOTE — PATIENT INSTRUCTIONS
Start Augmentin for UTI.  Please make an appointment to see Dr. Hale - call [phone]  Xray looks ok, make an appointment with Dr. Jones.  See Dr. Rivero as planned

## 2020-09-04 NOTE — PROGRESS NOTES
Subjective     Abhijeet Mccauley is a 59 year old male who presents to clinic today for the following health issues:    HPI       Musculoskeletal problem/pain  Onset/Duration: one month  Description  Location: leg/knee - bilateral - had L hip surgery 3 months ago  Joint Swelling: YES  Redness: no  Pain: YES  Warmth: no  Intensity:  severe  Progression of Symptoms:  worsening and constant  Accompanying signs and symptoms:   Fevers: no  Numbness/tingling/weakness: no  History  Trauma to the area: YES-has been falling aloty lately  Recent illness:  no  Previous similar problem: no  Previous evaluation:  no  Precipitating or alleviating factors:  Aggravating factors include: standing, walking, climbing stairs and cold or damp weather  Therapies tried and outcome: nothing    Above HPI reviewed. Additionally, reports increased dysuria, hematuria, malodorous urine. Has complicated history, recently hospitalized for sepsis, UTI, alcoholic cirrhosis, encephalopathy. Noted to have kidney stones, was referred [facility] as it is felt he needs stents, has not yet made this appointment.     He has not had recent fever, chills, nausea, vomiting, flank pain, back pain. Urinary symptoms started again 2-3 days ago per his PCA who is with him today.    Hip pain ongoing for the past month. Has not followed up with ortho since surgery in May.    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)     CKD (chronic kidney disease) stage 3, GFR 30-59 ml/min (H)     Rosacea     Mild intermittent asthma without complication     Peptic ulcer disease     Alcohol dependence (H)     Tobacco dependence syndrome     Tubular adenoma of colon     Normocytic anemia     Generalized  weakness     AIDP (acute inflammatory demyelinating polyneuropathy) (H)     Primary osteoarthritis of left knee     Esophageal varices (H)     Bilateral low back pain without sciatica     Iron deficiency anemia due to chronic blood loss     Status post total hip replacement, left     Rib fractures     Sepsis with acute renal failure and septic shock, due to unspecified organism, unspecified acute renal failure type (H)     zCoordination of complex care     Illiteracy and low-level literacy     Hepatic encephalopathy (H)     Recurrent falls     Urinary tract infection     UTI (urinary tract infection)     Anemia     Current Outpatient Medications   Medication     acetaminophen (TYLENOL) 325 MG tablet     albuterol (VENTOLIN HFA) 108 (90 Base) MCG/ACT inhaler     alum & mag hydroxide-simethicone (MAALOX  ES) 400-400-40 MG/5ML SUSP suspension     amoxicillin-clavulanate (AUGMENTIN) 875-125 MG tablet     atorvastatin 20 MG PO tablet     diclofenac (VOLTAREN) 1 % topical gel     fluticasone-salmeterol (ADVAIR) 500-50 MCG/DOSE inhaler     folic acid (FOLVITE) 1 MG tablet     lactulose 20 GM/30ML SOLN     montelukast (SINGULAIR) 10 MG tablet     order for DME     order for DME     order for DME     order for DME     order for DME     pantoprazole (PROTONIX) 40 MG EC tablet     QUEtiapine (SEROQUEL) 50 MG tablet     rifaximin (XIFAXAN) 550 MG TABS tablet     traZODone (DESYREL) 50 MG tablet     Lidocaine (LIDOCARE) 4 % Patch     melatonin 3 MG tablet     mineral oil-white petrolatum (EUCERIN) CREA cream     nicotine 2 MG MT lozenge     nystatin (MYCOSTATIN) 987705 UNIT/GM external powder     ondansetron (ZOFRAN) 4 MG tablet     polyethylene glycol (MIRALAX) 17 g packet     QUEtiapine (SEROQUEL) 25 MG tablet     senna-docusate (SENOKOT-S/PERICOLACE) 8.6-50 MG tablet     vitamin B1 (THIAMINE) 100 MG tablet     No current facility-administered medications for this visit.          Review of Systems   Constitutional, HEENT,  cardiovascular, pulmonary, gi and gu systems are negative, except as otherwise noted.      Objective    There were no vitals taken for this visit.  There is no height or weight on file to calculate BMI.  Physical Exam  Vitals signs and nursing note reviewed.   Constitutional:       Appearance: Normal appearance.   HENT:      Head: Normocephalic and atraumatic.      Mouth/Throat:      Mouth: Mucous membranes are moist.   Eyes:      Comments: Non-icteric   Neck:      Musculoskeletal: Neck supple.   Cardiovascular:      Rate and Rhythm: Normal rate and regular rhythm.      Pulses: Normal pulses.      Heart sounds: Normal heart sounds, S1 normal and S2 normal. Heart sounds not distant. No murmur. No friction rub. No gallop.    Pulmonary:      Effort: Pulmonary effort is normal.      Breath sounds: Normal breath sounds.   Abdominal:      General: Abdomen is flat. Bowel sounds are normal.      Palpations: Abdomen is soft.      Tenderness: There is no right CVA tenderness or left CVA tenderness.   Musculoskeletal:      Right lower leg: No edema.      Left lower leg: No edema.      Comments: Left hip - post operative scar to posterior hip appears well healed. No TTP of joint. No overlying erythema.   Skin:     General: Skin is warm and dry.      Capillary Refill: Capillary refill takes less than 2 seconds.   Neurological:      General: No focal deficit present.      Mental Status: He is alert and oriented to person, place, and time.   Psychiatric:         Mood and Affect: Mood normal.         Behavior: Behavior normal.         Thought Content: Thought content normal.         Judgment: Judgment normal.            Results for orders placed or performed in visit on 09/04/20 (from the past 24 hour(s))   UA with Microscopic reflex to Culture    Specimen: Midstream Urine   Result Value Ref Range    Color Urine Yellow     Appearance Urine Slightly Cloudy     Glucose Urine Negative NEG^Negative mg/dL    Bilirubin Urine Negative  "NEG^Negative    Ketones Urine Negative NEG^Negative mg/dL    Specific Gravity Urine 1.010 1.003 - 1.035    pH Urine 5.5 5.0 - 7.0 pH    Protein Albumin Urine 30 (A) NEG^Negative mg/dL    Urobilinogen Urine 0.2 0.2 - 1.0 EU/dL    Nitrite Urine Negative NEG^Negative    Blood Urine Large (A) NEG^Negative    Leukocyte Esterase Urine Moderate (A) NEG^Negative    Source Midstream Urine     WBC Urine >100 (A) OTO5^0 - 5 /HPF    RBC Urine >100 (A) OTO2^O - 2 /HPF    Squamous Epithelial /LPF Urine Few FEW^Few /LPF    Bacteria Urine Moderate (A) NEG^Negative /HPF    Yeast Urine Few (A) NEG^Negative /HPF     Xray - hardware appears to be in appropriate position. Pending radiology interpretation.          Assessment & Plan     Acute cystitis with hematuria  Last culture with enterococcus faecalis sensitive to PCN. Will start Augmentin, CrCl 57, no dose adjustment required. Follow up with PCP next week, should likely have repeat UA at that time. Stressed that he needs to follow up with urology, notes for 8/31 indicate that he is to follow up with a Dr. Hale at the [facility]. He is provided with the contact information.  - UA with Microscopic reflex to Culture  - amoxicillin-clavulanate (AUGMENTIN) 875-125 MG tablet; Take 1 tablet by mouth 2 times daily for 7 days    Hip pain, left  No obvious cause on xray. Stressed he needs to follow up with orthopedics. His PCA will help him schedule this.  - XR Hip Left 2-3 Views    Nonspecific finding on examination of urine    - Urine Culture Aerobic Bacterial     BMI:   Estimated body mass index is 28.73 kg/m  as calculated from the following:    Height as of this encounter: 1.676 m (5' 6\").    Weight as of this encounter: 80.7 kg (178 lb).           Patient Instructions   Start Augmentin for UTI.  Please make an appointment to see Dr. Hale - call [phone]  Xray looks ok, make an appointment with Dr. Jones.  See Dr. Rivero as planned      Return in about 1 week (around " 9/11/2020).    LOPEZ Aleman [facility]

## 2020-09-08 ENCOUNTER — TELEPHONE (OUTPATIENT)
Dept: FAMILY MEDICINE | Facility: CLINIC | Age: 60
End: 2020-09-08

## 2020-09-08 NOTE — TELEPHONE ENCOUNTER
Discharge summary reviewed.  Lasix is definitely supposed to be stopped due to dehydration concerns.  I see that gabapentin was stopped, but don't specifically see a reason listed why- possibly due to concern over his mental status- would recommend continuing off this for now.  Appears that trazodone was stopped and replaced with Seroquel.    So he should not be taking any of those three meds at this time.    Thanks,  Chidi Valenzuela MD

## 2020-09-08 NOTE — TELEPHONE ENCOUNTER
"S-(situation): copied from  note below:    Jenise from [facility]is calling for a medication clarification, asking if the patient should be taking Lasix, Gabapentin, Trazodone. They dont have them listed in his chart on there end.     B-(background): 8/13/20 - 8/29/20    A-(assessment): Trazodone is \"self-reported\";     Gabapentin discontinued 8/29/20:  Order Status  Reason  By  On    Discontinued  Stop at Discharge  Miky Riggs, DO  8/29/20 0924       Lasix discontinued 8/29/20:  Order Status  Reason  By  On    Discontinued  Stop at Discharge  Miky Riggs, DO  8/29/20 0924        R-(recommendations): please advise if patient should have stopped these medications or continue them?    "

## 2020-09-08 NOTE — TELEPHONE ENCOUNTER
Jenise from [facility]is calling for a medication clarification, asking if the patient should be taking Lasix, Gabapentin, Trazodone. They dont have them listed in his chart on there end.     [phone] is Frida number     Karen Menjivar on 9/8/2020 at 2:47 PM

## 2020-09-09 ENCOUNTER — MEDICAL CORRESPONDENCE (OUTPATIENT)
Dept: HEALTH INFORMATION MANAGEMENT | Facility: CLINIC | Age: 60
End: 2020-09-09

## 2020-09-09 ENCOUNTER — TELEPHONE (OUTPATIENT)
Dept: FAMILY MEDICINE | Facility: CLINIC | Age: 60
End: 2020-09-09

## 2020-09-09 DIAGNOSIS — N39.0 COMPLICATED UTI (URINARY TRACT INFECTION): Primary | ICD-10-CM

## 2020-09-09 LAB
BACTERIA SPEC CULT: ABNORMAL
BACTERIA SPEC CULT: ABNORMAL
Lab: ABNORMAL
SPECIMEN SOURCE: ABNORMAL

## 2020-09-09 RX ORDER — GRANULES FOR ORAL 3 G/1
3 POWDER ORAL ONCE
Qty: 1 PACKET | Refills: 0 | Status: SHIPPED | OUTPATIENT
Start: 2020-09-09 | End: 2020-09-09

## 2020-09-09 NOTE — TELEPHONE ENCOUNTER
Physician Orders - PT and OT to eval and treat    Signed, faxed to [phone] and sent to [employer]. Lia Montalvo on 9/9/2020 at 3:45 PM

## 2020-09-10 ENCOUNTER — MEDICAL CORRESPONDENCE (OUTPATIENT)
Dept: HEALTH INFORMATION MANAGEMENT | Facility: CLINIC | Age: 60
End: 2020-09-10

## 2020-09-10 ENCOUNTER — TELEPHONE (OUTPATIENT)
Dept: FAMILY MEDICINE | Facility: CLINIC | Age: 60
End: 2020-09-10

## 2020-09-10 NOTE — TELEPHONE ENCOUNTER
Physician Orders- Initial PT Eval delayed due to patient request okay to admit to [facility] for PT eval/treat 1x/wk x 4 wks    Signed,faxed to [phone] and sent to [employer]. Lia Montalvo on 9/10/2020 at 10:46 AM

## 2020-09-10 NOTE — TELEPHONE ENCOUNTER
Spoke with patient and got him scheduled with Dr Renteria 10/14 at 9:15am and letter will be mailed out

## 2020-09-15 ENCOUNTER — MEDICAL CORRESPONDENCE (OUTPATIENT)
Dept: HEALTH INFORMATION MANAGEMENT | Facility: CLINIC | Age: 60
End: 2020-09-15

## 2020-09-16 ENCOUNTER — MEDICAL CORRESPONDENCE (OUTPATIENT)
Dept: HEALTH INFORMATION MANAGEMENT | Facility: CLINIC | Age: 60
End: 2020-09-16

## 2020-09-16 ENCOUNTER — TELEPHONE (OUTPATIENT)
Dept: FAMILY MEDICINE | Facility: CLINIC | Age: 60
End: 2020-09-16

## 2020-09-16 ENCOUNTER — PATIENT OUTREACH (OUTPATIENT)
Dept: CARE COORDINATION | Facility: CLINIC | Age: 60
End: 2020-09-16

## 2020-09-16 ENCOUNTER — OFFICE VISIT (OUTPATIENT)
Dept: FAMILY MEDICINE | Facility: CLINIC | Age: 60
End: 2020-09-16
Payer: COMMERCIAL

## 2020-09-16 VITALS
OXYGEN SATURATION: 97 % | WEIGHT: 191 LBS | HEIGHT: 66 IN | DIASTOLIC BLOOD PRESSURE: 98 MMHG | SYSTOLIC BLOOD PRESSURE: 158 MMHG | HEART RATE: 100 BPM | TEMPERATURE: 96.8 F | BODY MASS INDEX: 30.7 KG/M2

## 2020-09-16 DIAGNOSIS — R82.90 NONSPECIFIC FINDING ON EXAMINATION OF URINE: ICD-10-CM

## 2020-09-16 DIAGNOSIS — D64.9 NORMOCYTIC ANEMIA: ICD-10-CM

## 2020-09-16 DIAGNOSIS — N18.30 CKD (CHRONIC KIDNEY DISEASE) STAGE 3, GFR 30-59 ML/MIN (H): ICD-10-CM

## 2020-09-16 DIAGNOSIS — N30.01 ACUTE CYSTITIS WITH HEMATURIA: ICD-10-CM

## 2020-09-16 DIAGNOSIS — I10 ESSENTIAL HYPERTENSION: ICD-10-CM

## 2020-09-16 DIAGNOSIS — M25.552 HIP PAIN, LEFT: ICD-10-CM

## 2020-09-16 DIAGNOSIS — R30.0 DYSURIA: ICD-10-CM

## 2020-09-16 DIAGNOSIS — K70.31 ALCOHOLIC CIRRHOSIS OF LIVER WITH ASCITES (H): Primary | ICD-10-CM

## 2020-09-16 LAB
ALBUMIN SERPL-MCNC: 2.6 G/DL (ref 3.4–5)
ALBUMIN UR-MCNC: 30 MG/DL
ALP SERPL-CCNC: 535 U/L (ref 40–150)
ALT SERPL W P-5'-P-CCNC: 19 U/L (ref 0–70)
ANION GAP SERPL CALCULATED.3IONS-SCNC: 3 MMOL/L (ref 3–14)
APPEARANCE UR: ABNORMAL
AST SERPL W P-5'-P-CCNC: 43 U/L (ref 0–45)
BACTERIA #/AREA URNS HPF: ABNORMAL /HPF
BILIRUB SERPL-MCNC: 1 MG/DL (ref 0.2–1.3)
BILIRUB UR QL STRIP: NEGATIVE
BUN SERPL-MCNC: 12 MG/DL (ref 7–30)
CALCIUM SERPL-MCNC: 8.7 MG/DL (ref 8.5–10.1)
CHLORIDE SERPL-SCNC: 109 MMOL/L (ref 94–109)
CO2 SERPL-SCNC: 26 MMOL/L (ref 20–32)
COLOR UR AUTO: YELLOW
CREAT SERPL-MCNC: 1.46 MG/DL (ref 0.66–1.25)
ERYTHROCYTE [DISTWIDTH] IN BLOOD BY AUTOMATED COUNT: 21.5 % (ref 10–15)
GFR SERPL CREATININE-BSD FRML MDRD: 51 ML/MIN/{1.73_M2}
GLUCOSE SERPL-MCNC: 104 MG/DL (ref 70–99)
GLUCOSE UR STRIP-MCNC: NEGATIVE MG/DL
HCT VFR BLD AUTO: 27.2 % (ref 40–53)
HGB BLD-MCNC: 8.4 G/DL (ref 13.3–17.7)
HGB UR QL STRIP: ABNORMAL
KETONES UR STRIP-MCNC: NEGATIVE MG/DL
LEUKOCYTE ESTERASE UR QL STRIP: ABNORMAL
MCH RBC QN AUTO: 26.9 PG (ref 26.5–33)
MCHC RBC AUTO-ENTMCNC: 30.9 G/DL (ref 31.5–36.5)
MCV RBC AUTO: 87 FL (ref 78–100)
MUCOUS THREADS #/AREA URNS LPF: PRESENT /LPF
NITRATE UR QL: NEGATIVE
NON-SQ EPI CELLS #/AREA URNS LPF: ABNORMAL /LPF
PH UR STRIP: 6 PH (ref 5–7)
PLATELET # BLD AUTO: 171 10E9/L (ref 150–450)
POTASSIUM SERPL-SCNC: 3.7 MMOL/L (ref 3.4–5.3)
PROT SERPL-MCNC: 6.8 G/DL (ref 6.8–8.8)
RBC # BLD AUTO: 3.12 10E12/L (ref 4.4–5.9)
RBC #/AREA URNS AUTO: >100 /HPF
SODIUM SERPL-SCNC: 138 MMOL/L (ref 133–144)
SOURCE: ABNORMAL
SP GR UR STRIP: 1.02 (ref 1–1.03)
UROBILINOGEN UR STRIP-ACNC: 0.2 EU/DL (ref 0.2–1)
WBC # BLD AUTO: 10.4 10E9/L (ref 4–11)
WBC #/AREA URNS AUTO: ABNORMAL /HPF

## 2020-09-16 PROCEDURE — 36415 COLL VENOUS BLD VENIPUNCTURE: CPT | Performed by: INTERNAL MEDICINE

## 2020-09-16 PROCEDURE — 87086 URINE CULTURE/COLONY COUNT: CPT | Performed by: INTERNAL MEDICINE

## 2020-09-16 PROCEDURE — 81001 URINALYSIS AUTO W/SCOPE: CPT | Performed by: INTERNAL MEDICINE

## 2020-09-16 PROCEDURE — 87186 SC STD MICRODIL/AGAR DIL: CPT | Performed by: INTERNAL MEDICINE

## 2020-09-16 PROCEDURE — 87088 URINE BACTERIA CULTURE: CPT | Performed by: INTERNAL MEDICINE

## 2020-09-16 PROCEDURE — 85027 COMPLETE CBC AUTOMATED: CPT | Performed by: INTERNAL MEDICINE

## 2020-09-16 PROCEDURE — 80053 COMPREHEN METABOLIC PANEL: CPT | Performed by: INTERNAL MEDICINE

## 2020-09-16 PROCEDURE — 99215 OFFICE O/P EST HI 40 MIN: CPT | Performed by: INTERNAL MEDICINE

## 2020-09-16 RX ORDER — EPINEPHRINE 0.3 MG/.3ML
0.3 INJECTION SUBCUTANEOUS PRN
Status: CANCELLED | OUTPATIENT
Start: 2020-09-16

## 2020-09-16 RX ORDER — FUROSEMIDE 20 MG
20 TABLET ORAL DAILY
Qty: 90 TABLET | Refills: 3 | Status: ON HOLD | OUTPATIENT
Start: 2020-09-16 | End: 2021-05-20

## 2020-09-16 RX ORDER — SPIRONOLACTONE 50 MG/1
50 TABLET, FILM COATED ORAL DAILY
Qty: 90 TABLET | Refills: 3 | Status: SHIPPED | OUTPATIENT
Start: 2020-09-16 | End: 2020-09-23

## 2020-09-16 RX ORDER — LEVOFLOXACIN 750 MG/1
750 TABLET, FILM COATED ORAL DAILY
Qty: 5 TABLET | Refills: 0 | Status: SHIPPED | OUTPATIENT
Start: 2020-09-16 | End: 2020-09-21

## 2020-09-16 RX ORDER — LEVOFLOXACIN 750 MG/1
750 TABLET, FILM COATED ORAL DAILY
Qty: 7 TABLET | Refills: 0 | Status: CANCELLED | OUTPATIENT
Start: 2020-09-16 | End: 2020-09-23

## 2020-09-16 ASSESSMENT — MIFFLIN-ST. JEOR: SCORE: 1614.12

## 2020-09-16 NOTE — PATIENT INSTRUCTIONS
Schedule a follow-up with orthopedics to discuss the ongoing hip and knee pain.  Please call Aleta, our clinic care coordinator, at [phone] and she can help you out.    Restart the spironolactone and furosemide to help with the swelling and bloating.  Return in 2 weeks for recheck.     We'll try the levofloxacin for the urine infection.  We'll let you know later this week what the culture results are.        Thank you for choosing [facility].  You may be receiving an email and/or telephone survey request from [facility] regarding your visit today.  Please take a few minutes to respond to the survey to let us know how we are doing.      If you have questions or concerns, please contact us via [employer] or you can contact your care team at [phone].    Our Clinic hours are:  Monday 6:40 am  to 7:00 pm  Tuesday -Friday 6:40 am to 5:00 pm    The [place] outpatient lab hours are:  Monday - Friday 6:10 am to 4:45 pm  Saturdays 7:00 am to 11:00 am  Appointments are required, call [phone]    If you have clinical questions after hours or would like to schedule an appointment,  call the clinic at [phone].

## 2020-09-16 NOTE — TELEPHONE ENCOUNTER
Plan of Care    RN/LPN 1x wk for 6 wk start 8/31/2020  OT/PIÑA 1x wk for 1 wk start 8/31/2020  PT/PTA 1x wk for 1 wk start 8/31/2020    Signed, faxed to [phone] and sent to [employer]. Lia Montalvo on 9/16/2020 at 1:35 PM

## 2020-09-16 NOTE — PROGRESS NOTES
Subjective     Abhijeet Mccauley is a 59 year old male who presents to clinic today for the following health issues:    HPI   Chief Complaint   Patient presents with     Hospital F/U     Post hospital follow up.     Epi-pen     Discuss about an Epi-pen.     Edema     swelling in lower legs/ankles/feet worse since Hospital discharge. Bloating in stomach as well      UTI     patient states urine infetion has not gone away .           Hospital Follow-up Visit:    Hospital/Nursing Home/IP Rehab Facility: [facility]  Date of Admission: 8-  Date of Discharge: 8-  Reason(s) for Admission: Presented after a fall for evaluation.      Discharge Diagnoses     1.  Multifactorial encephalopathy with metabolic and alcohol/hepatic encephalopathy + alcohol withdrawal.  2.  Anemia s/p transfusion   3.   Prior documented rib fractures   4.  Transient episode of respiratory distress 8/16 of unclear etiology, no respiratory complaints toward discharge  5.  Deconditioning, patient setup for home care/therapy         Was your hospitalization related to COVID-19? No   Problems taking medications regularly:  None  Medication changes since discharge: None  Problems adhering to non-medication therapy:  None    Summary of hospitalization:  [facility] discharge summary reviewed    Diuretics held on d/c: Consider resuming low dose lasix, beta blocker, spironolactone as outpatient.  Needs urology (scheduled 10/14) and psych f/u.  Had visit with GI on 9/3 with no changes, 1 month f/u planned.  Seen in clinic on 9/4 for UTI and advised to f/u with urology and also ortho re hip pain     Diagnostic Tests/Treatments reviewed.  Follow up needed: med f/u  Other Healthcare Providers Involved in Patient s Care:         Specialist appointment - urology as noted above  Update since discharge: stable.     Abhijeet reports ongoing urinary symptoms- constant pain when he has to urinate (no pain otherwise) and light gray  "color to the urine.  Increased frequency and urgency.      He is not yet scheduled with ortho for follow-up of the ongoing hip pain.  Continues to have knee pain as well.      His appointment note says to discuss Epi-pen, but he isn't sure what this is about.  States he's never had an anaphylactic reaction, doesn't react to bee stings.  Just has some environmental allergies for which he takes an allergy pill.  I searched his chart to see if I could fine any indication for an Epi-pen but did not.      He is having pain from bilateral lower leg swelling    He reports he is keeping up on his water intake.  States he hasn't had hard alcohol \"in awhile\", but had wine cooler last night and \"every once in awhile\".      Post Discharge Medication Reconciliation: discharge medications reconciled and changed, per note/orders.  Plan of care communicated with patient              Genitourinary - Male  Onset/Duration: ongoing   Description:   Dysuria (painful urination): YES  Hematuria (blood in urine): no  Frequency: YES  Waking at night to urinate: YES  Hesitancy (delay in urine): YES  Retention (unable to empty): no  Decrease in urinary flow: YES  Incontinence: no  Progression of Symptoms:  same  Accompanying Signs & Symptoms:  Fever: no  Back/Flank pain: YES  Urethral discharge: no  Testicle lumps/masses/pain: YES  Nausea and/or vomiting: YES  Abdominal pain: YES  History:   History of frequent UTI s: YES  History of kidney stones: no  History of hernias: no  Personal or Family history of Prostate problems: no  Sexually active: no  Precipitating or alleviating factors: None  Therapies tried and outcome: course of antibiotics - Augmentin and fosfomycin- very little relief     Review of Systems   Constitutional, MSK, gi and gu systems are negative, except as otherwise noted.      Objective    BP (!) 158/98 (BP Location: Right arm, Patient Position: Chair, Cuff Size: Adult Regular)   Pulse 100   Temp 96.8  F (36  C) (Tympanic) " "  Ht 1.676 m (5' 6\")   Wt 86.6 kg (191 lb)   SpO2 97%   BMI 30.83 kg/m    Body mass index is 30.83 kg/m .  Physical Exam   GENERAL: healthy, alert and no distress  RESP: lungs clear to auscultation - no rales, rhonchi or wheezes  CV: regular rates and rhythm, normal S1 S2, no S3 or S4, no murmur, click or rub and bilateral lower leg pitting edema    Results for orders placed or performed in visit on 09/16/20 (from the past 24 hour(s))   CBC with platelets   Result Value Ref Range    WBC 10.4 4.0 - 11.0 10e9/L    RBC Count 3.12 (L) 4.4 - 5.9 10e12/L    Hemoglobin 8.4 (L) 13.3 - 17.7 g/dL    Hematocrit 27.2 (L) 40.0 - 53.0 %    MCV 87 78 - 100 fl    MCH 26.9 26.5 - 33.0 pg    MCHC 30.9 (L) 31.5 - 36.5 g/dL    RDW 21.5 (H) 10.0 - 15.0 %    Platelet Count 171 150 - 450 10e9/L   Comprehensive metabolic panel   Result Value Ref Range    Sodium 138 133 - 144 mmol/L    Potassium 3.7 3.4 - 5.3 mmol/L    Chloride 109 94 - 109 mmol/L    Carbon Dioxide 26 20 - 32 mmol/L    Anion Gap 3 3 - 14 mmol/L    Glucose 104 (H) 70 - 99 mg/dL    Urea Nitrogen 12 7 - 30 mg/dL    Creatinine 1.46 (H) 0.66 - 1.25 mg/dL    GFR Estimate 51 (L) >60 mL/min/[1.73_m2]    GFR Estimate If Black 59 (L) >60 mL/min/[1.73_m2]    Calcium 8.7 8.5 - 10.1 mg/dL    Bilirubin Total 1.0 0.2 - 1.3 mg/dL    Albumin 2.6 (L) 3.4 - 5.0 g/dL    Protein Total 6.8 6.8 - 8.8 g/dL    Alkaline Phosphatase 535 (H) 40 - 150 U/L    ALT 19 0 - 70 U/L    AST 43 0 - 45 U/L   *UA reflex to Microscopic and Culture ([place] and [facility] (except [place] and [place])    Specimen: Midstream Urine   Result Value Ref Range    Color Urine Yellow     Appearance Urine Cloudy     Glucose Urine Negative NEG^Negative mg/dL    Bilirubin Urine Negative NEG^Negative    Ketones Urine Negative NEG^Negative mg/dL    Specific Gravity Urine 1.020 1.003 - 1.035    Blood Urine Large (A) NEG^Negative    pH Urine 6.0 5.0 - 7.0 pH    Protein Albumin Urine 30 (A) NEG^Negative mg/dL    " Urobilinogen Urine 0.2 0.2 - 1.0 EU/dL    Nitrite Urine Negative NEG^Negative    Leukocyte Esterase Urine Moderate (A) NEG^Negative    Source Midstream Urine    Urine Microscopic   Result Value Ref Range    WBC Urine 10-25 (A) OTO5^0 - 5 /HPF    RBC Urine >100 (A) OTO2^O - 2 /HPF    Squamous Epithelial /LPF Urine Few FEW^Few /LPF    Bacteria Urine Few (A) NEG^Negative /HPF    Mucous Urine Present (A) NEG^Negative /LPF     *Note: Due to a large number of results and/or encounters for the requested time period, some results have not been displayed. A complete set of results can be found in Results Review.           Assessment & Plan     Alcoholic cirrhosis of liver with ascites (H)    Abhijeet appears to be accumulating ascites and lower leg fluid again.  We will resume the Lasix and spironolactone and f/u in 1 week to reassess.  He was also previously on propranolol for varices- will consider resuming this if tolerating resuming diuretics okay.      - Comprehensive metabolic panel  - furosemide (LASIX) 20 MG tablet; Take 1 tablet (20 mg) by mouth daily  - spironolactone (ALDACTONE) 50 MG tablet; Take 1 tablet (50 mg) by mouth daily  - **Basic metabolic panel FUTURE 14d; Future    Acute cystitis with hematuria    U/A shows fewer WBCs since last time but still a lot of RBCs.  Symptoms did not significantly improve after the fosfomycin.  Unfortunately the resistance pattern from his recent urine cultures shows resistance or only intermediate effects from oral antibiotics.  Discussed doing a course of IV cefepime through the infusion center, but they would not be able to infuse at our location over the week and he is not interested in going to the [facility] for infusion over the weekend.  We'll try levofloxacin for now- this had intermediate response on the last culture so hopefully will have some activity.  We'll follow-up on culture results from today and reassess next week to see how he is responding.  Follow-up with urology  as scheduled regarding ureteral stent and stones.     - levofloxacin (LEVAQUIN) 750 MG tablet; Take 1 tablet (750 mg) by mouth daily for 5 days    Dysuria    - *UA reflex to Microscopic and Culture ([place] and [facility] (except [place] and [place])  - Urine Microscopic    CKD (chronic kidney disease) stage 3, GFR 30-59 ml/min (H)    Stable on lab today    - Comprehensive metabolic panel    Normocytic anemia    Hgb has gone down about a point from last check, appears he is losing some blood in the urine.  He has not noted any GI bleeding.  Will recheck next week.     - CBC with platelets  - **Hemoglobin FUTURE 14d; Future    Nonspecific finding on examination of urine    - Urine Culture Aerobic Bacterial    Hip pain, left    Reminded him to schedule f/u with ortho.  Gave him phone number for care coordinator if he needs assistance with scheduling.      Essential hypertension    BP high today, will see if this improves with resuming diuretics.  Recheck next week.          Return in about 1 week (around 9/23/2020) for Lab Work, In-Clinic Visit.    Chidi Valenzuela MD  [facility]    More than 50% of this 40 minute face-to-face appointment was spent on counseling and coordination of care of the above.

## 2020-09-16 NOTE — PROGRESS NOTES
Clinic Care Coordination Contact  [facility]/Voicemail       Clinical Data: Care Coordinator Outreach  Outreach attempted x 1.  Left message on patient's voicemail with call back information and requested return call.  Plan: Care Coordinator will try to reach patient again in 10 business days.    Aleta German [facility] Care Coordination  [facility]  Tel: [phone]

## 2020-09-20 LAB
BACTERIA SPEC CULT: ABNORMAL
BACTERIA SPEC CULT: ABNORMAL
Lab: ABNORMAL
SPECIMEN SOURCE: ABNORMAL

## 2020-09-22 ENCOUNTER — TELEPHONE (OUTPATIENT)
Dept: FAMILY MEDICINE | Facility: CLINIC | Age: 60
End: 2020-09-22

## 2020-09-22 DIAGNOSIS — F41.9 ANXIETY: ICD-10-CM

## 2020-09-22 DIAGNOSIS — G47.00 INSOMNIA, UNSPECIFIED TYPE: Primary | ICD-10-CM

## 2020-09-22 RX ORDER — QUETIAPINE FUMARATE 50 MG/1
50 TABLET, FILM COATED ORAL AT BEDTIME
Qty: 30 TABLET | Refills: 3 | Status: SHIPPED | OUTPATIENT
Start: 2020-09-22 | End: 2020-09-23

## 2020-09-22 RX ORDER — QUETIAPINE FUMARATE 25 MG/1
25 TABLET, FILM COATED ORAL EVERY MORNING
Qty: 30 TABLET | Refills: 3 | Status: SHIPPED | OUTPATIENT
Start: 2020-09-22 | End: 2020-11-04

## 2020-09-22 RX ORDER — LANOLIN ALCOHOL/MO/W.PET/CERES
3 CREAM (GRAM) TOPICAL AT BEDTIME
Qty: 90 TABLET | Refills: 3 | Status: SHIPPED | OUTPATIENT
Start: 2020-09-22 | End: 2021-04-15

## 2020-09-22 NOTE — TELEPHONE ENCOUNTER
Jenise from [employer] Is calling and would like refills and to let PCP know about missed medications. Two morning slots and 3 evening doses where not taken. Not constant on medication. PCA is reaching out to social working to get a pill depressor.     Pending Prescriptions:                       Disp   Refills    QUEtiapine (SEROQUEL) 25 MG tablet        30 tab*0            Sig: Take 1 tablet (25 mg) by mouth every morning    QUEtiapine (SEROQUEL) 50 MG tablet        30 tab*0            Sig: Take 1 tablet (50 mg) by mouth At Bedtime    melatonin 3 MG tablet                                         Sig: Take 1 tablet (3 mg) by mouth At Bedtime    [facility] [address] [place] [address]  [address] [facility] [address]  Phone: [phone] Fax: [phone] [facility] Fax: [phone], [phone]    Jenise  [phone]      Karen Menjivar on 9/22/2020 at 11:34 AM

## 2020-09-22 NOTE — TELEPHONE ENCOUNTER
Routing refill request to provider for review/approval because:  Melatonin medication; Medication is reported/historical  Seroquel: prescribed by alternative provider Miky Riggs in-patient

## 2020-09-23 ENCOUNTER — TELEPHONE (OUTPATIENT)
Dept: FAMILY MEDICINE | Facility: CLINIC | Age: 60
End: 2020-09-23

## 2020-09-23 ENCOUNTER — OFFICE VISIT (OUTPATIENT)
Dept: FAMILY MEDICINE | Facility: CLINIC | Age: 60
End: 2020-09-23
Payer: COMMERCIAL

## 2020-09-23 ENCOUNTER — MEDICAL CORRESPONDENCE (OUTPATIENT)
Dept: HEALTH INFORMATION MANAGEMENT | Facility: CLINIC | Age: 60
End: 2020-09-23

## 2020-09-23 VITALS
SYSTOLIC BLOOD PRESSURE: 132 MMHG | OXYGEN SATURATION: 98 % | HEIGHT: 66 IN | DIASTOLIC BLOOD PRESSURE: 68 MMHG | TEMPERATURE: 98.6 F | WEIGHT: 187.6 LBS | BODY MASS INDEX: 30.15 KG/M2 | HEART RATE: 98 BPM

## 2020-09-23 DIAGNOSIS — F41.9 ANXIETY: ICD-10-CM

## 2020-09-23 DIAGNOSIS — M47.818 OSTEOARTHRITIS OF SPINE WITHOUT MYELOPATHY OR RADICULOPATHY, SACRAL AND SACROCOCCYGEAL REGION: ICD-10-CM

## 2020-09-23 DIAGNOSIS — M79.641 PAIN IN BOTH HANDS: ICD-10-CM

## 2020-09-23 DIAGNOSIS — M87.9 BILATERAL HIP OSTEONECROSIS (H): ICD-10-CM

## 2020-09-23 DIAGNOSIS — R82.90 CLOUDY URINE: Primary | ICD-10-CM

## 2020-09-23 DIAGNOSIS — M79.642 PAIN IN BOTH HANDS: ICD-10-CM

## 2020-09-23 DIAGNOSIS — D64.9 NORMOCYTIC ANEMIA: ICD-10-CM

## 2020-09-23 DIAGNOSIS — K70.31 ALCOHOLIC CIRRHOSIS OF LIVER WITH ASCITES (H): ICD-10-CM

## 2020-09-23 LAB
ANION GAP SERPL CALCULATED.3IONS-SCNC: 8 MMOL/L (ref 3–14)
BUN SERPL-MCNC: 13 MG/DL (ref 7–30)
CALCIUM SERPL-MCNC: 8.3 MG/DL (ref 8.5–10.1)
CHLORIDE SERPL-SCNC: 106 MMOL/L (ref 94–109)
CO2 SERPL-SCNC: 22 MMOL/L (ref 20–32)
CREAT SERPL-MCNC: 2 MG/DL (ref 0.66–1.25)
GFR SERPL CREATININE-BSD FRML MDRD: 35 ML/MIN/{1.73_M2}
GLUCOSE SERPL-MCNC: 94 MG/DL (ref 70–99)
HGB BLD-MCNC: 7.8 G/DL (ref 13.3–17.7)
POTASSIUM SERPL-SCNC: 3.6 MMOL/L (ref 3.4–5.3)
SODIUM SERPL-SCNC: 136 MMOL/L (ref 133–144)

## 2020-09-23 PROCEDURE — 36415 COLL VENOUS BLD VENIPUNCTURE: CPT | Performed by: INTERNAL MEDICINE

## 2020-09-23 PROCEDURE — 85018 HEMOGLOBIN: CPT | Performed by: INTERNAL MEDICINE

## 2020-09-23 PROCEDURE — 99215 OFFICE O/P EST HI 40 MIN: CPT | Performed by: INTERNAL MEDICINE

## 2020-09-23 PROCEDURE — 80048 BASIC METABOLIC PNL TOTAL CA: CPT | Performed by: INTERNAL MEDICINE

## 2020-09-23 RX ORDER — QUETIAPINE FUMARATE 100 MG/1
100 TABLET, FILM COATED ORAL AT BEDTIME
Qty: 30 TABLET | Refills: 3 | Status: SHIPPED | OUTPATIENT
Start: 2020-09-23 | End: 2020-11-04

## 2020-09-23 RX ORDER — ACETAMINOPHEN 500 MG
TABLET ORAL
Qty: 100 TABLET | Refills: 3 | Status: SHIPPED | OUTPATIENT
Start: 2020-09-23 | End: 2021-03-25

## 2020-09-23 RX ORDER — SPIRONOLACTONE 25 MG/1
25 TABLET ORAL DAILY
Qty: 90 TABLET | Refills: 3 | Status: ON HOLD | OUTPATIENT
Start: 2020-09-23 | End: 2021-05-20

## 2020-09-23 RX ORDER — GABAPENTIN 300 MG/1
300 CAPSULE ORAL 2 TIMES DAILY
Qty: 60 CAPSULE | Refills: 3 | Status: SHIPPED | OUTPATIENT
Start: 2020-09-23 | End: 2021-07-30

## 2020-09-23 ASSESSMENT — MIFFLIN-ST. JEOR: SCORE: 1598.7

## 2020-09-23 NOTE — TELEPHONE ENCOUNTER
Client hospitalized, okay to place all home care services on hold effective 8/14/2020    Signed, faxed to [phone] and sent to [employer].   . Lia Montalvo on 9/23/2020 at 3:25 PM

## 2020-09-23 NOTE — PATIENT INSTRUCTIONS
Let us know if you get any fevers, pain over the bladder, of pain when you urinate, and then we would recheck the urine.      Stop the trazodone for now.  We'll try increasing Seroquel to 100mg at night to see if that helps with sleep.      Restart the gabapentin 300mg twice per day.    We'll see what your lab results show before we change the water pills.

## 2020-09-23 NOTE — PROGRESS NOTES
"Subjective     Abhijeet Mccauley is a 59 year old male who presents to clinic today for the following health issues:    Chief Complaint   Patient presents with     RECHECK        HPI      -Patient said he finished his antibiotic about two days ago and is still having cloudy urine.  He is not having any dysuria.  No hematuria.  Is having urinary frequency, but we resumed diuretics.  No fevers, abdominal pain, flank pain.        I saw Abhijeet last week on 9/16 for a few different issues:    Cirrhosis: \"Abhijeet appears to be accumulating ascites and lower leg fluid again.  We will resume the Lasix and spironolactone and f/u in 1 week to reassess.  He was also previously on propranolol for varices- will consider resuming this if tolerating resuming diuretics okay.\"    Cystitis: \"U/A shows fewer WBCs since last time but still a lot of RBCs.  Symptoms did not significantly improve after the fosfomycin.  Unfortunately the resistance pattern from his recent urine cultures shows resistance or only intermediate effects from oral antibiotics.  Discussed doing a course of IV cefepime through the infusion center, but they would not be able to infuse at our location over the week and he is not interested in going to the [facility] for infusion over the weekend.  We'll try levofloxacin for now- this had intermediate response on the last culture so hopefully will have some activity.  We'll follow-up on culture results from today and reassess next week to see how he is responding.  Follow-up with urology as scheduled regarding ureteral stent and stones.\"      Luckily, the culture did show sensitivity to levofloxacin this time.      \"Hgb has gone down about a point from last check, appears he is losing some blood in the urine.  He has not noted any GI bleeding.  Will recheck next week.\"  Hgb is 7.9 today, down from 8.4 last week.  He one dot of red blood in the stool in the past couple of days.      BP was high, we were going to recheck after " "resuming diuretics.  BP is improved today    He feels like he should still be taking trazodone and gabapentin again.      Mood seems to be doing better.  Seroquel helps some with sleep; he is sleeping more during the day than at night.      He is wondering about getting an order for aquatic therapy for hip pain and back pain.        Review of Systems   Constitutional, MSK, psych,  systems are negative, except as otherwise noted.      Objective    /68   Pulse 98   Temp 98.6  F (37  C) (Tympanic)   Ht 1.676 m (5' 6\")   Wt 85.1 kg (187 lb 9.6 oz)   SpO2 98%   BMI 30.28 kg/m    Body mass index is 30.28 kg/m .  Physical Exam     GENERAL: healthy, alert and no distress  RESP: lungs clear to auscultation - no rales, rhonchi or wheezes  CV: regular rate and rhythm, normal S1 S2, no S3 or S4, no murmur, click or rub  ABDOMEN: soft, non-tender  BACK: no CVA tenderness     Results for orders placed or performed in visit on 09/23/20 (from the past 24 hour(s))   **Basic metabolic panel FUTURE 14d   Result Value Ref Range    Sodium 136 133 - 144 mmol/L    Potassium 3.6 3.4 - 5.3 mmol/L    Chloride 106 94 - 109 mmol/L    Carbon Dioxide 22 20 - 32 mmol/L    Anion Gap 8 3 - 14 mmol/L    Glucose 94 70 - 99 mg/dL    Urea Nitrogen 13 7 - 30 mg/dL    Creatinine 2.00 (H) 0.66 - 1.25 mg/dL    GFR Estimate 35 (L) >60 mL/min/[1.73_m2]    GFR Estimate If Black 40 (L) >60 mL/min/[1.73_m2]    Calcium 8.3 (L) 8.5 - 10.1 mg/dL   **Hemoglobin FUTURE 14d   Result Value Ref Range    Hemoglobin 7.8 (L) 13.3 - 17.7 g/dL     *Note: Due to a large number of results and/or encounters for the requested time period, some results have not been displayed. A complete set of results can be found in Results Review.           Assessment & Plan     Alcoholic cirrhosis of liver with ascites (H)    Swelling improved, but creatinine has increased significantly since resuming furosemide and spironolactone.  We will reduce spironolactone by half and " recheck lab and clinical status next week.     - **Basic metabolic panel FUTURE 14d  - spironolactone (ALDACTONE) 25 MG tablet; Take 1 tablet (25 mg) by mouth daily  - **Basic metabolic panel FUTURE anytime; Future    Normocytic anemia    Hgb has drifted down a bit further from last week.  He has noticed small amounts of blood in the stool.  He is to f/u with GI next month.  We will recheck Hgb next week.      - **Hemoglobin FUTURE 14d  - **Hemoglobin FUTURE anytime; Future    Cloudy urine    Dysuria has resolved since last week, only symptoms are cloudy urine, which we discussed is not a specific indicator of UTI, and frequency, but he just resumed diuretics.  Did not recommend retesting unless he develops pain or fevers.     Anxiety    He has been using Seroquel and trazodone together- discussed that we should just still with one of these meds.  We will try increasing the Seroquel night time dose from 50mg to 100mg to see if that is more effective for sleep.  Mood has been good recently.     - QUEtiapine (SEROQUEL) 100 MG tablet; Take 1 tablet (100 mg) by mouth At Bedtime    Osteoarthritis of spine without myelopathy or radiculopathy, sacral and sacrococcygeal region    Resume gabapentin, referral placed for aquatic therapy.     - gabapentin (NEURONTIN) 300 MG capsule; Take 1 capsule (300 mg) by mouth 2 times daily  - Other Specialty Referral    Bilateral hip osteonecrosis (H)    - Other Specialty Referral       Return in about 1 week (around 9/30/2020) for Lab Work, In-Clinic Visit.    Chidi Valenzuela MD  [facility]    More than 50% of this 40 minute face-to-face appointment was spent on counseling and coordination of care of the above.

## 2020-09-23 NOTE — TELEPHONE ENCOUNTER
Routing refill request to provider for review/approval because:  Drug not active on patient's medication list- was discontinued on a previous visit   Tiffani Pemberton RNC

## 2020-09-23 NOTE — TELEPHONE ENCOUNTER
"Requested Prescriptions   Pending Prescriptions Disp Refills     acetaminophen (TYLENOL) 500 MG tablet [Pharmacy Med Name: ACETAMINOPHEN 500MG TABS] 100 tablet 3     Sig: TAKE ONE TO TWO TABLETS BY MOUTH EVERY 6 HOURS AS NEEDED FOR MILD PAIN. DO NOT TAKE MORE THAN 3000 MG ACETAMINOPHEN TOTAL IN ONE DAY.       Analgesics (Non-Narcotic Tylenol and ASA Only) Passed - 9/23/2020  8:33 AM        Passed - Recent (12 mo) or future (30 days) visit within the authorizing provider's specialty     Patient has had an office visit with the authorizing provider or a provider within the authorizing providers department within the previous 12 mos or has a future within next 30 days. See \"Patient Info\" tab in inbasket, or \"Choose Columns\" in Meds & Orders section of the refill encounter.              Passed - Patient is 7 months old or older     If patient is a peds patient of the age 7 mos -12 years, ok to refill using weight-based dosing.     If >3g daily and/or sig is not \"prn\", check for liver enzymes. If normal in the last year, ok to refill.  If not, refer to the provider.          Passed - Medication is active on med list             "

## 2020-09-24 ENCOUNTER — MEDICAL CORRESPONDENCE (OUTPATIENT)
Dept: HEALTH INFORMATION MANAGEMENT | Facility: CLINIC | Age: 60
End: 2020-09-24

## 2020-09-25 ENCOUNTER — TELEPHONE (OUTPATIENT)
Dept: FAMILY MEDICINE | Facility: CLINIC | Age: 60
End: 2020-09-25

## 2020-09-25 PROBLEM — R15.9 INCONTINENT OF FECES: Status: ACTIVE | Noted: 2020-09-25

## 2020-09-25 PROBLEM — R32 URINARY INCONTINENCE: Status: ACTIVE | Noted: 2020-09-25

## 2020-09-28 ENCOUNTER — TELEPHONE (OUTPATIENT)
Dept: FAMILY MEDICINE | Facility: CLINIC | Age: 60
End: 2020-09-28

## 2020-09-28 NOTE — TELEPHONE ENCOUNTER
Incontinence Supply Order Prescription-Pullups    Signed, faxed to [phone] and sent to [employer]. Lia Montalvo on 9/28/2020 at 7:54 AM

## 2020-09-29 ENCOUNTER — MEDICAL CORRESPONDENCE (OUTPATIENT)
Dept: HEALTH INFORMATION MANAGEMENT | Facility: CLINIC | Age: 60
End: 2020-09-29

## 2020-09-29 ENCOUNTER — TELEPHONE (OUTPATIENT)
Dept: FAMILY MEDICINE | Facility: CLINIC | Age: 60
End: 2020-09-29

## 2020-09-30 ENCOUNTER — NURSE TRIAGE (OUTPATIENT)
Dept: NURSING | Facility: CLINIC | Age: 60
End: 2020-09-30

## 2020-09-30 NOTE — TELEPHONE ENCOUNTER
Patient calling for appt. only. Denies triage.   Transferred to scheduling.  Tracy Pierson RN [facility]

## 2020-10-02 ENCOUNTER — OFFICE VISIT (OUTPATIENT)
Dept: FAMILY MEDICINE | Facility: CLINIC | Age: 60
End: 2020-10-02
Payer: COMMERCIAL

## 2020-10-02 ENCOUNTER — PATIENT OUTREACH (OUTPATIENT)
Dept: NURSING | Facility: CLINIC | Age: 60
End: 2020-10-02
Payer: COMMERCIAL

## 2020-10-02 ENCOUNTER — PATIENT OUTREACH (OUTPATIENT)
Dept: UROLOGY | Facility: CLINIC | Age: 60
End: 2020-10-02

## 2020-10-02 VITALS
WEIGHT: 185 LBS | HEART RATE: 97 BPM | TEMPERATURE: 98 F | OXYGEN SATURATION: 98 % | SYSTOLIC BLOOD PRESSURE: 134 MMHG | RESPIRATION RATE: 15 BRPM | BODY MASS INDEX: 29.73 KG/M2 | DIASTOLIC BLOOD PRESSURE: 68 MMHG | HEIGHT: 66 IN

## 2020-10-02 DIAGNOSIS — R82.90 NONSPECIFIC FINDING ON EXAMINATION OF URINE: ICD-10-CM

## 2020-10-02 DIAGNOSIS — R35.0 BENIGN PROSTATIC HYPERPLASIA WITH URINARY FREQUENCY: ICD-10-CM

## 2020-10-02 DIAGNOSIS — N30.01 ACUTE CYSTITIS WITH HEMATURIA: Primary | ICD-10-CM

## 2020-10-02 DIAGNOSIS — R30.0 DYSURIA: ICD-10-CM

## 2020-10-02 DIAGNOSIS — N40.1 BENIGN PROSTATIC HYPERPLASIA WITH URINARY FREQUENCY: ICD-10-CM

## 2020-10-02 DIAGNOSIS — Z23 NEED FOR PROPHYLACTIC VACCINATION AND INOCULATION AGAINST INFLUENZA: ICD-10-CM

## 2020-10-02 LAB
ALBUMIN UR-MCNC: ABNORMAL MG/DL
APPEARANCE UR: ABNORMAL
BACTERIA #/AREA URNS HPF: ABNORMAL /HPF
BILIRUB UR QL STRIP: NEGATIVE
COLOR UR AUTO: YELLOW
GLUCOSE UR STRIP-MCNC: NEGATIVE MG/DL
HGB UR QL STRIP: ABNORMAL
KETONES UR STRIP-MCNC: NEGATIVE MG/DL
LEUKOCYTE ESTERASE UR QL STRIP: ABNORMAL
NITRATE UR QL: NEGATIVE
NON-SQ EPI CELLS #/AREA URNS LPF: ABNORMAL /LPF
PH UR STRIP: 5.5 PH (ref 5–7)
RBC #/AREA URNS AUTO: ABNORMAL /HPF
SOURCE: ABNORMAL
SP GR UR STRIP: 1.01 (ref 1–1.03)
UROBILINOGEN UR STRIP-ACNC: 0.2 EU/DL (ref 0.2–1)
WBC #/AREA URNS AUTO: ABNORMAL /HPF

## 2020-10-02 PROCEDURE — 99214 OFFICE O/P EST MOD 30 MIN: CPT | Mod: 25 | Performed by: NURSE PRACTITIONER

## 2020-10-02 PROCEDURE — 90682 RIV4 VACC RECOMBINANT DNA IM: CPT | Performed by: NURSE PRACTITIONER

## 2020-10-02 PROCEDURE — 81001 URINALYSIS AUTO W/SCOPE: CPT | Performed by: NURSE PRACTITIONER

## 2020-10-02 PROCEDURE — 87086 URINE CULTURE/COLONY COUNT: CPT | Performed by: NURSE PRACTITIONER

## 2020-10-02 PROCEDURE — 90471 IMMUNIZATION ADMIN: CPT | Performed by: NURSE PRACTITIONER

## 2020-10-02 RX ORDER — FINASTERIDE 5 MG/1
5 TABLET, FILM COATED ORAL DAILY
Qty: 90 TABLET | Refills: 3 | Status: SHIPPED | OUTPATIENT
Start: 2020-10-02 | End: 2021-10-14

## 2020-10-02 RX ORDER — SULFAMETHOXAZOLE/TRIMETHOPRIM 800-160 MG
1 TABLET ORAL 2 TIMES DAILY
Qty: 10 TABLET | Refills: 0 | Status: SHIPPED | OUTPATIENT
Start: 2020-10-02 | End: 2020-10-07

## 2020-10-02 ASSESSMENT — MIFFLIN-ST. JEOR: SCORE: 1586.9

## 2020-10-02 NOTE — PROGRESS NOTES
Clinic Care Coordination Contact    Follow Up Progress Note      Assessment: Patient voiced frustration that he does not understand why he has not received home care services. He reported he was in the hospital for his drinking and he has not had home care since this time. He reported he wants home care to resume. [facility] asked patient why home care has stopped. Patient reported he did not know why.    [facility] verified with Home Health Care that Home Health had stopped because patient had reached his goals.     Leah at Home Health verified this.  Home Health Care: [phone]  P/T goals met on 9/17/2020; O/T goals met on 9/24/2020; RN goals met on 9/29/2020.       Goals addressed this encounter:  Patient has not set goals as patient is not enrolled-Patient remains potential       Outreach Frequency: monthly    Plan:   Care Coordinator will follow up later today-or Monday to notify patient of conversation outcome with Home Health Care. Patient can call Home Health care to discuss discharge and re entry into program.    RUDY Allen     [facility] Care Coordination  [facility]  Tel: [phone]

## 2020-10-02 NOTE — PATIENT INSTRUCTIONS
1.  Make sure your getting fluids.  2.  Take Finasteride (Proscar) daily for prostate enlargement.  3.  Take antibiotic as directed.  4.  Follow-up with Dr. Valenzuela if you are not having improvement with urination in the next 3 months.      Patient Education     BPH (Enlarged Prostate)  The prostate is a gland at the base of the bladder. As some men get older, the prostate may get bigger in size. This problem is called benign prostatic hyperplasia (BPH). BPH puts pressure on the urethra. This is the tube that carries urine from the bladder to the penis. It may interfere with the flow of urine. It may also keep the bladder from emptying fully.    Symptoms of BPH include trouble starting urination and feeling as though the bladder isn t emptying all the way. It also includes a weak urine stream, dribbling and leaking of urine, and frequent and urgent urination (especially at night). BPH can increase the risk of urinary infections. It can also block off urine flow completely. If this occurs, a thin tube (catheter) may be passed into the bladder to help drain urine.  If symptoms are mild, no treatment may be needed right now. If symptoms are more severe, treatment is likely needed. The goal of treatment is to improve urine flow and reduce symptoms. Treatments can include medicine and procedures. Your healthcare provider will discuss treatment options with you as needed.  Home care  The following guidelines will help you care for yourself at home:    Urinate as soon as you feel the urge. Don't try to hold your urine.    Don't limit your fluid intake during the day. Drink 6 to 8 glasses of water or liquids a day. This prevents bacteria from building up in the bladder.    Avoid drinking fluids after dinner to help reduce urination during the night.    Avoid medicines that can worsen your symptoms. These include certain cold and allergy medicines and antidepressants. Diuretics used for high blood pressure can also worsen  symptoms. Talk to your doctor about the medicines you take. Other choices may work better for you.  Prostate cancer screening  BPH does not increase the risk of prostate cancer. But because prostate cancer is a common cancer in men, screening is sometimes recommended. This may help detect the cancer in its early stages when treatment is most effective. Factors that can increase the risk of prostate cancer include being -American or having a father or brother who had prostate cancer. A high-fat diet may also increase the risk of prostate cancer. Talk to your healthcare provider to see whether you should be screened for prostate cancer.  Follow-up care  Follow up with your healthcare provider, or as advised  To learn more, go to:    National Kidney & Urologic Diseases Information Clearinghouse  kidney.niddk.nih.gov, 858.889.2867  When to seek medical advice  Call your healthcare provider right away if any of these occur:    Fever of 100.4 F (38.0 C) or higher, or as advised    Unable to pass urine for 8 hours    Increasing pressure or pain in your bladder (lower abdomen)    Blood in the urine    Increasing low back pain, not related to injury    Symptoms of urinary infection (increased urge to urinate, burning when passing urine, foul-smelling urine)  Date Last Reviewed: 7/1/2016 2000-2019 [employer]. [address] [place] [address], [place] [address]. All rights reserved. This information is not intended as a substitute for professional medical care. Always follow your healthcare professional's instructions.

## 2020-10-02 NOTE — PROGRESS NOTES
"Unit(s)  Abhijeet Mccauley is a 59 year old male who presents to clinic today for the following health issues:    HPI         Genitourinary - Male  Onset/Duration: ongoing for over 1 month   Description:   Dysuria (painful urination): YES  Hematuria (blood in urine): YES  Frequency: YES  Waking at night to urinate: YES  Hesitancy (delay in urine): no  Retention (unable to empty): no  Decrease in urinary flow: YES  Incontinence: YES  Progression of Symptoms:  worsening and constant  Accompanying Signs & Symptoms:  Fever: no  Back/Flank pain: no  Urethral discharge: no  Testicle lumps/masses/pain: no  Nausea and/or vomiting: no  Abdominal pain: no  History:   History of frequent UTI s: YES  History of kidney stones: YES  History of hernias: no  Personal or Family history of Prostate problems: no  Sexually active: no  Precipitating or alleviating factors: None  Therapies tried and outcome: course of antibiotics     Patient has complex hx with alcoholic cirrhosis of the liver, and has been in the hospital twice since June for sepsis.  He has been having recurrent UTI's.  He has hx of CKD with GFR 35 at end of September.  He describes that he does not feel like he empty's his bladder but doesn't have to strain to urinate, he has spraying with urination and leaks urine after urination as well.  He also has frequency of urination all the time.    Review of Systems   CONSTITUTIONAL: NEGATIVE for fever, chills, change in weight  RESP: NEGATIVE for significant cough or SOB  CV: NEGATIVE for chest pain, palpitations or peripheral edema  : frequency, hesitancy, Hx urinary tract infection, retention and spraying with urination  PSYCHIATRIC: NEGATIVE for changes in mood or affect  ROS otherwise negative      Objective    /68   Pulse 97   Temp 98  F (36.7  C) (Tympanic)   Resp 15   Ht 1.676 m (5' 6\")   Wt 83.9 kg (185 lb)   SpO2 98%   BMI 29.86 kg/m    Body mass index is 29.86 kg/m .  Physical Exam   GENERAL: " healthy, alert and no distress  RESP: lungs clear to auscultation - no rales, rhonchi or wheezes  CV: regular rate and rhythm, normal S1 S2, no S3 or S4, no murmur, click or rub, no peripheral edema and peripheral pulses strong  ABDOMEN: soft, nontender, no hepatosplenomegaly, no masses and bowel sounds normal  PSYCH: mentation appears normal, affect normal/bright    Results for orders placed or performed in visit on 10/02/20   *UA reflex to Microscopic and Culture ([facility] (except [place] and [place])     Status: Abnormal    Specimen: Midstream Urine   Result Value Ref Range    Color Urine Yellow     Appearance Urine Slightly Cloudy     Glucose Urine Negative NEG^Negative mg/dL    Bilirubin Urine Negative NEG^Negative    Ketones Urine Negative NEG^Negative mg/dL    Specific Gravity Urine 1.015 1.003 - 1.035    Blood Urine Large (A) NEG^Negative    pH Urine 5.5 5.0 - 7.0 pH    Protein Albumin Urine Trace (A) NEG^Negative mg/dL    Urobilinogen Urine 0.2 0.2 - 1.0 EU/dL    Nitrite Urine Negative NEG^Negative    Leukocyte Esterase Urine Moderate (A) NEG^Negative    Source Midstream Urine    Urine Microscopic     Status: Abnormal   Result Value Ref Range    WBC Urine 10-25 (A) OTO5^0 - 5 /HPF    RBC Urine  (A) OTO2^O - 2 /HPF    Squamous Epithelial /LPF Urine Few FEW^Few /LPF    Bacteria Urine Few (A) NEG^Negative /HPF           Assessment & Plan     Acute cystitis with hematuria  UA is positive for urinary tract infection.  Treating with Bactrim DS for 5 days.  No dosing changes with GFR 35 per Up to Date recommendations.  Recommend follow-up if symptoms are not improving and resolving on medication.  - sulfamethoxazole-trimethoprim (BACTRIM DS) 800-160 MG tablet; Take 1 tablet by mouth 2 times daily for 5 days    Dysuria  - *UA reflex to Microscopic and Culture ([place] and [facility] (except [place] and [place])  - Urine Microscopic    Nonspecific finding on examination of  urine  - Urine Culture Aerobic Bacterial    Benign prostatic hyperplasia with urinary frequency  Patient has symptoms of BPH which may be cause of frequent UTI's.  Treating with Finasteride 5 mg daily and follow-up with PCP in 3 months and if any further UTI symptoms between now and then.  - finasteride (PROSCAR) 5 MG tablet; Take 1 tablet (5 mg) by mouth daily    Need for prophylactic vaccination and inoculation against influenza  - INFLUENZA QUAD, RECOMBINANT, P-FREE (RIV4) (FLUBLOCK) [44596]  - Vaccine Administration, Initial [54405]     See Patient Instructions    Return in about 1 month (around 11/2/2020) for in person with Dr. Valenzuela or sooner if recurrent UTI symptoms.    Yessica Jose NP  [facility]

## 2020-10-03 LAB
BACTERIA SPEC CULT: NORMAL
Lab: NORMAL
SPECIMEN SOURCE: NORMAL

## 2020-10-05 ENCOUNTER — PATIENT OUTREACH (OUTPATIENT)
Dept: CARE COORDINATION | Facility: CLINIC | Age: 60
End: 2020-10-05

## 2020-10-05 NOTE — PROGRESS NOTES
Clinic Care Coordination Contact  [facility]/Voicemail       Clinical Data: Care Coordinator Outreach  Outreach attempted x 1. Patient answered phone and said he was just getting out of bed. Could not talk.    Plan: Care Coordinator will try to reach patient again in 1-2 business days.    Aleta German, [facility] Care Coordination  [facility] [place]  Tel: [phone]

## 2020-10-06 ENCOUNTER — VIRTUAL VISIT (OUTPATIENT)
Dept: UROLOGY | Facility: CLINIC | Age: 60
End: 2020-10-06
Payer: COMMERCIAL

## 2020-10-06 DIAGNOSIS — F10.280 ALCOHOL DEPENDENCE WITH ALCOHOL-INDUCED ANXIETY DISORDER (H): ICD-10-CM

## 2020-10-06 DIAGNOSIS — N20.0 KIDNEY STONE: Primary | ICD-10-CM

## 2020-10-06 DIAGNOSIS — K21.9 GASTROESOPHAGEAL REFLUX DISEASE WITHOUT ESOPHAGITIS: ICD-10-CM

## 2020-10-06 PROCEDURE — 99207 PR NO BILLABLE SERVICE THIS VISIT: CPT | Mod: 95 | Performed by: UROLOGY

## 2020-10-06 ASSESSMENT — PAIN SCALES - GENERAL: PAINLEVEL: NO PAIN (0)

## 2020-10-06 NOTE — LETTER
Date:October 12, 2020      Provider requested that no letter be sent. Do not send.       [facility] Information

## 2020-10-06 NOTE — TELEPHONE ENCOUNTER
"Requested Prescriptions   Pending Prescriptions Disp Refills     pantoprazole (PROTONIX) 40 MG EC tablet [Pharmacy Med Name: PANTOPRAZOLE SODIUM 40MG TBEC] 120 tablet 0     Sig: TAKE ONE TABLET BY MOUTH TWICE A DAY       PPI Protocol Passed - 10/6/2020 12:45 PM        Passed - Not on Clopidogrel (unless Pantoprazole ordered)        Passed - No diagnosis of osteoporosis on record        Passed - Recent (12 mo) or future (30 days) visit within the authorizing provider's specialty     Patient has had an office visit with the authorizing provider or a provider within the authorizing providers department within the previous 12 mos or has a future within next 30 days. See \"Patient Info\" tab in inbasket, or \"Choose Columns\" in Meds & Orders section of the refill encounter.              Passed - Medication is active on med list        Passed - Patient is age 18 or older             "

## 2020-10-06 NOTE — PROGRESS NOTES
Abhijeet Mccauley is a 59 year old male who is being evaluated via a billable telephone visit.      Patient did not answer phone at any listed number, voicemail full, will try again over course of week as he has known stone that needs attention

## 2020-10-06 NOTE — LETTER
10/6/2020       RE: Abhijeet Mccauley  [address] [place] [id number]     Dear Colleague,    Thank you for referring your patient, Abhijeet Mccauley, to the [facility] [place] [facility]. Please see a copy of my visit note below.    Abhijeet Mccauley is a 59 year old male who is being evaluated via a billable telephone visit.      Patient did not answer phone at any listed number, voicemail full, will try again over course of week as he has known stone that needs attention      Again, thank you for allowing me to participate in the care of your patient.      Sincerely,    Adrian Hale MD

## 2020-10-06 NOTE — TELEPHONE ENCOUNTER
Routing refill request to provider for review/approval because:  Drug not on the [facility] refill protocol   On med list under Miky Riggs DO Cindy F., RN, BSN

## 2020-10-06 NOTE — TELEPHONE ENCOUNTER
Requested Prescriptions   Pending Prescriptions Disp Refills     rifaximin (XIFAXAN) 550 MG TABS tablet 60 tablet 0     Sig: Take 1 tablet (550 mg) by mouth 2 times daily       There is no refill protocol information for this order

## 2020-10-07 DIAGNOSIS — J30.1 SEASONAL ALLERGIC RHINITIS DUE TO POLLEN: ICD-10-CM

## 2020-10-07 RX ORDER — PANTOPRAZOLE SODIUM 40 MG/1
TABLET, DELAYED RELEASE ORAL
Qty: 120 TABLET | Refills: 0 | Status: SHIPPED | OUTPATIENT
Start: 2020-10-07 | End: 2021-01-15

## 2020-10-07 NOTE — TELEPHONE ENCOUNTER
"Requested Prescriptions   Pending Prescriptions Disp Refills     loratadine (CLARITIN) 10 MG tablet [Pharmacy Med Name: LORATADINE 10MG TABS] 30 tablet 11     Sig: TAKE ONE TABLET BY MOUTH ONCE DAILY AS NEEDED FOR ALLERGIES       Antihistamines Protocol Failed - 10/7/2020 11:59 AM        Failed - Medication is active on med list        Passed - Patient is 3-64 years of age     Apply weight-based dosing for peds patients age 3 - 12 years of age.    Forward request to provider for patients under the age of 3 or over the age of 64.          Passed - Recent (12 mo) or future (30 days) visit within the authorizing provider's specialty     Patient has had an office visit with the authorizing provider or a provider within the authorizing providers department within the previous 12 mos or has a future within next 30 days. See \"Patient Info\" tab in inbasket, or \"Choose Columns\" in Meds & Orders section of the refill encounter.                   "

## 2020-10-08 ENCOUNTER — MEDICAL CORRESPONDENCE (OUTPATIENT)
Dept: HEALTH INFORMATION MANAGEMENT | Facility: CLINIC | Age: 60
End: 2020-10-08

## 2020-10-08 ENCOUNTER — TELEPHONE (OUTPATIENT)
Dept: FAMILY MEDICINE | Facility: CLINIC | Age: 60
End: 2020-10-08

## 2020-10-08 RX ORDER — LORATADINE 10 MG/1
TABLET ORAL
Qty: 30 TABLET | Refills: 11 | OUTPATIENT
Start: 2020-10-08

## 2020-10-09 ENCOUNTER — MEDICAL CORRESPONDENCE (OUTPATIENT)
Dept: HEALTH INFORMATION MANAGEMENT | Facility: CLINIC | Age: 60
End: 2020-10-09

## 2020-10-09 DIAGNOSIS — F10.280 ALCOHOL DEPENDENCE WITH ALCOHOL-INDUCED ANXIETY DISORDER (H): ICD-10-CM

## 2020-10-12 NOTE — TELEPHONE ENCOUNTER
Appears refill was sent a few days ago to the [place] pharmacy. I resent this to our pharmacy.    Thanks,  Chidi Valenzuela MD

## 2020-10-12 NOTE — PROGRESS NOTES
Clinic Care Coordination Contact  [facility]/Voicemail       Clinical Data: Care Coordinator Outreach  Outreach attempted x 2.     Patient's voicemail is full    Plan:  Care Coordinator will try to reach patient again in 10 business days.    RUDY Allen     [facility] Care Coordination  [facility]  Tel: [phone]

## 2020-10-14 ENCOUNTER — PRE VISIT (OUTPATIENT)
Dept: UROLOGY | Facility: CLINIC | Age: 60
End: 2020-10-14

## 2020-10-15 ENCOUNTER — TELEPHONE (OUTPATIENT)
Dept: FAMILY MEDICINE | Facility: CLINIC | Age: 60
End: 2020-10-15

## 2020-10-15 DIAGNOSIS — J30.1 SEASONAL ALLERGIC RHINITIS DUE TO POLLEN: ICD-10-CM

## 2020-10-15 NOTE — TELEPHONE ENCOUNTER
"Patient reports he spoke with urologist couple days ago (epic does not reflect this, urology had a previsit planning done yesterday for his appt 10-20-20, there is a VV 10-6-20, however, patient did not answer phone) and was told \"they are going to cut me open to remove the stone.\"  He is concerned because \"a long time ago I was told that if I ever had surgery on my kidneys or liver in the future, I will not make it.\" - he says after his cancer recovery he was told this.    He would like your opinion.    Routing to provider.  Fatou ESTRADA RN          "

## 2020-10-15 NOTE — TELEPHONE ENCOUNTER
Urology did send me a staff message about procedure.  They are considering different options for his procedure and are concerned about his possible increased bleeding risk due to liver dysfunction from his alcohol use.  Urologist and I discussed it may be best to do a procedure that would be lower in bleeding risk.  Abhijeet will be able to discuss the different options further with urology at his visit 10/20.    Thanks,  Chidi Valenzuela MD

## 2020-10-15 NOTE — TELEPHONE ENCOUNTER
"Reason for Call:  Other     Detailed comments: Patient says he has kidney stones and urologist wants to \"cut him open\". Patient wants to know what Dr. Valenzuela thinks, he thinks his kidneys are supposed to be cut open    Phone Number Patient can be reached at: Cell number on file:    Telephone Information:   Mobile [phone]       Best Time: Any    Can we leave a detailed message on this number? YES    Call taken on 10/15/2020 at 1:04 PM by Lia Montalvo      "

## 2020-10-16 RX ORDER — LORATADINE 10 MG/1
TABLET ORAL
Qty: 30 TABLET | Refills: 11 | OUTPATIENT
Start: 2020-10-16

## 2020-10-16 NOTE — TELEPHONE ENCOUNTER
"Requested Prescriptions   Pending Prescriptions Disp Refills     loratadine (CLARITIN) 10 MG tablet [Pharmacy Med Name: LORATADINE 10MG TABS] 30 tablet 11     Sig: TAKE ONE TABLET BY MOUTH ONCE DAILY AS NEEDED FOR ALLERGIES       Antihistamines Protocol Failed - 10/15/2020  5:03 PM        Failed - Medication is active on med list        Passed - Patient is 3-64 years of age     Apply weight-based dosing for peds patients age 3 - 12 years of age.    Forward request to provider for patients under the age of 3 or over the age of 64.          Passed - Recent (12 mo) or future (30 days) visit within the authorizing provider's specialty     Patient has had an office visit with the authorizing provider or a provider within the authorizing providers department within the previous 12 mos or has a future within next 30 days. See \"Patient Info\" tab in inbasket, or \"Choose Columns\" in Meds & Orders section of the refill encounter.                   "

## 2020-10-19 ENCOUNTER — DOCUMENTATION ONLY (OUTPATIENT)
Dept: OTHER | Facility: CLINIC | Age: 60
End: 2020-10-19

## 2020-10-20 ENCOUNTER — TELEPHONE (OUTPATIENT)
Dept: FAMILY MEDICINE | Facility: CLINIC | Age: 60
End: 2020-10-20

## 2020-10-20 ENCOUNTER — VIRTUAL VISIT (OUTPATIENT)
Dept: UROLOGY | Facility: CLINIC | Age: 60
End: 2020-10-20
Payer: COMMERCIAL

## 2020-10-20 DIAGNOSIS — N20.0 KIDNEY STONE: Primary | ICD-10-CM

## 2020-10-20 PROCEDURE — 99442 PR PHYSICIAN TELEPHONE EVALUATION 11-20 MIN: CPT | Mod: 95 | Performed by: UROLOGY

## 2020-10-20 RX ORDER — TRAZODONE HYDROCHLORIDE 50 MG/1
TABLET, FILM COATED ORAL
COMMUNITY
Start: 2020-10-17 | End: 2020-11-04

## 2020-10-20 RX ORDER — QUETIAPINE FUMARATE 50 MG/1
TABLET, FILM COATED ORAL
COMMUNITY
Start: 2020-10-15 | End: 2020-11-04

## 2020-10-20 ASSESSMENT — PAIN SCALES - GENERAL: PAINLEVEL: NO PAIN (0)
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
## 2020-10-20 NOTE — TELEPHONE ENCOUNTER
Reason for Call:  Medications Spironolactone and Rifaximin    Detailed comments: [facility] is calling and stating that Abhijeet has not picked up his Spironolactone and Rifaximin, so when the RN came today to set up meds, she could not fill those two meds. Jenise is just letting us know, incase he could become worse. If You have questions, please call Jenise at [phone].  Lia Leal  [facility]        Call taken on 10/20/2020 at 3:29 PM by Lia Romo
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
## 2020-10-23 ENCOUNTER — TELEPHONE (OUTPATIENT)
Dept: UROLOGY | Facility: CLINIC | Age: 60
End: 2020-10-23

## 2020-10-23 PROBLEM — N20.0 KIDNEY STONE: Status: ACTIVE | Noted: 2020-10-23

## 2020-10-23 NOTE — TELEPHONE ENCOUNTER
Patient is scheduled for surgery with Dr. Hale      Spoke or left message with: spoke with Abhijeet    Date of Surgery: 11/25/2020    Location: [place]    Informed patient they will need an adult  yes    Pre-op with surgeon (if applicable): n/a    H&P: Patient to schedule with pcp at [facility]    Additional imaging/appointments: n/a    Surgery packet: Mailed on 10/23/2020     Additional comments: COVID test scheduled at [facility] on 11/21/2020 @ 2:30p

## 2020-10-24 DIAGNOSIS — Z11.59 ENCOUNTER FOR SCREENING FOR OTHER VIRAL DISEASES: Primary | ICD-10-CM

## 2020-10-27 ENCOUNTER — TELEPHONE (OUTPATIENT)
Dept: FAMILY MEDICINE | Facility: CLINIC | Age: 60
End: 2020-10-27

## 2020-10-27 NOTE — TELEPHONE ENCOUNTER
It looks like aspirin was to be stopped in July.  He was on this for DVT prevention after hip surgery and should be done now.  Due to his GI bleeding problems, would not recommend resuming this.    Spironolactone dose was decreased to 25mg daily in September.     As previously advised, he should not take the trazodone while on the Seroquel.  If he doesn't feel thie Seroquel is working, we should discuss at an appointment.  Looks like he has one scheduled on 11/17.    Thanks,  Chidi Valenzuela MD

## 2020-10-27 NOTE — TELEPHONE ENCOUNTER
Hello,    Patient is looking to get Aspirin 81ec refilled. I havent seen that Dr. Chidi Valenzuela prescribed this to the patient but patient's nurse(Jenise) insist Dr. Chidi Rivero has prescribed low dose Aspirin for the patient.    If there's any question please feel free to call us([facility]) at [phone]    Thank You  Korina Neri  Pharmacy Technician  [facility]

## 2020-10-27 NOTE — TELEPHONE ENCOUNTER
Left message from Jenise home care RN, to return call to clinic.     There is another encounter open. Please review.     LEONARD ThaoN, RN

## 2020-10-27 NOTE — TELEPHONE ENCOUNTER
I think he should be off of the aspirin.  I see I got another telephone encounter about this as well, so will address further there.    Thanks,  Chidi Valenzuela MD

## 2020-10-27 NOTE — TELEPHONE ENCOUNTER
Left message from Jenise home care RN, to return call to clinic.     There is another encounter open. Please review.     LEONARD ThaoN, RN       *[facility], [address], [address], [place] [address], [phone]

## 2020-10-28 ENCOUNTER — PATIENT OUTREACH (OUTPATIENT)
Dept: CARE COORDINATION | Facility: CLINIC | Age: 60
End: 2020-10-28

## 2020-10-28 NOTE — TELEPHONE ENCOUNTER
Jenise GURROLA from  notified.  She states patient is taking the trazodone.  RN called patient and notified him of msg - he is not happy, however, understands.  Will discuss at appt 11-17.  RN called [facility] pharmacy and cancelled trazodone RX - we do not have an active one, however, pharmacy did and was dispensing him.    Fatou ESTRADA RN BSN

## 2020-10-28 NOTE — LETTER
[facility]  [address] [facility] [address] [facility] [address]      October 28, 2020    Abhijeet Coreyafson  [address] [place] [id number]      Dear Abhijeet,    I am a clinic care coordinator who works with Chidi Valenzuela MD at [facility] . I recently tried to call and was unable to reach you. Below is a description of clinic care coordination and how I can further assist you.      The clinic care coordination team is made up of a registered nurse,  and community health worker who understand the health care system. The goal of clinic care coordination is to help you manage your health and improve access to the health care system in the most efficient manner. The team can assist you in meeting your health care goals by providing education, coordinating services, strengthening the communication among your providers and supporting you with any resource needs.    Please feel free to contact me at [phone] with any questions or concerns. We are focused on providing you with the highest-quality healthcare experience possible and that all starts with you.     Sincerely,     RUDY Allen     [facility] Care Coordination  [facility]  Tel: [phone]

## 2020-10-28 NOTE — PROGRESS NOTES
Clinic Care Coordination Contact  [facility]/Voicemail       Clinical Data: Care Coordinator Outreach  Outreach attempted x 3. Patient's voicemail is full.    Plan: Care Coordinator will send unable to contact letter with care coordinator contact information via mail. Care Coordinator will do no further outreaches at this time.    Aleta German NATHAN     [facility] Care Coordination  [facility]  Tel: [phone]

## 2020-10-28 NOTE — TELEPHONE ENCOUNTER
Left message for ERIKA Burden, return call to clinic.    There is another msg for this patient as well.    Fatou ESTRADA RN BSN

## 2020-10-29 ENCOUNTER — TRANSFERRED RECORDS (OUTPATIENT)
Dept: HEALTH INFORMATION MANAGEMENT | Facility: CLINIC | Age: 60
End: 2020-10-29

## 2020-10-29 ENCOUNTER — HOSPITAL ENCOUNTER (INPATIENT)
Facility: CLINIC | Age: 60
Setting detail: SURGERY ADMIT
End: 2020-10-29
Attending: ORTHOPAEDIC SURGERY | Admitting: ORTHOPAEDIC SURGERY
Payer: COMMERCIAL

## 2020-10-30 ENCOUNTER — MEDICAL CORRESPONDENCE (OUTPATIENT)
Dept: HEALTH INFORMATION MANAGEMENT | Facility: CLINIC | Age: 60
End: 2020-10-30

## 2020-10-30 ENCOUNTER — TELEPHONE (OUTPATIENT)
Dept: FAMILY MEDICINE | Facility: CLINIC | Age: 60
End: 2020-10-30

## 2020-10-30 NOTE — TELEPHONE ENCOUNTER
Physician Orders    Signed, faxed to [phone] and sent to [employer]. Lia Montalvo on 10/30/2020 at 3:42 PM

## 2020-11-02 ENCOUNTER — TELEPHONE (OUTPATIENT)
Dept: FAMILY MEDICINE | Facility: CLINIC | Age: 60
End: 2020-11-02

## 2020-11-02 DIAGNOSIS — F10.280 ALCOHOL DEPENDENCE WITH ALCOHOL-INDUCED ANXIETY DISORDER (H): ICD-10-CM

## 2020-11-02 NOTE — TELEPHONE ENCOUNTER
I spoke to the patient and he is upset and refusing medication. Advised he bring in his bottles to the upcoming appt. He only got one month of rifaximin did you intend he stay on that? Margo Angulo RN

## 2020-11-02 NOTE — PATIENT INSTRUCTIONS
Schedule surgery with Dr. Hale.    It was a pleasure meeting with you today.  Thank you for allowing me and my team the privilege of caring for you today.  YOU are the reason we are here, and I truly hope we provided you with the excellent service you deserve.  Please let us know if there is anything else we can do for you so that we can be sure you are leaving completely satisfied with your care experience.        Lesvia Box, CMA

## 2020-11-02 NOTE — TELEPHONE ENCOUNTER
To clarify, he is on Seroquel in place of the trazodone.  The rifaximin is for his liver.  He has already been advised to schedule an appointment to discuss this issue and it does look like he has a future appointment set up.    Thanks,  Chidi Valenzuela MD

## 2020-11-02 NOTE — TELEPHONE ENCOUNTER
I think I had just refilled it once until he was able to follow-up, but looks like he will be out of that refill before his appointment, so I put another refill on file.    Thanks,  Chidi Valenzuela MD

## 2020-11-02 NOTE — TELEPHONE ENCOUNTER
Pharmacy [facility]    S-(situation): The patient reports he does not like the way the Rifaximin makes him feel.    B-(background): The patient was prescribed this on 10/12/210 in place of the trazodone.     A-(assessment): The patient states this medication causes him to be irritated and shaking. The patient is more fatigue and tired. The patient states he wants to sleep all day. The patient has been having more mood swings also. The patient refuses to take this medications. He states it is causing confusion also.      R-(recommendations): Will send to provider to review and advise.    Thank you  Yessica FISHER RN

## 2020-11-02 NOTE — TELEPHONE ENCOUNTER
Reason for Call:  not liking his new medication    Detailed comments: patient is calling and very cranky about his new medication, that replaced his Trazadone, he does not know the name. He does not like it, it makes him a zombie. He cannot focus. He did get a couple of medications during the early part of October, but he does not know which one it is.     Phone Number Patient can be reached at: Home number on file [phone] (home)    Best Time: any    Can we leave a detailed message on this number? YES   Lia Leal  [facility] [place]       Call taken on 11/2/2020 at 12:03 PM by Lia Romo

## 2020-11-03 NOTE — TELEPHONE ENCOUNTER
Patient is contacted and he is upset about his medications and has questions about his medications.  Patient does swear occasionally on the phone.  Telling someone there we are giving him the F----ing run around.  I have scheduled patient for a phone visit to discuss his medications with his PCP. Catherine WANG RN

## 2020-11-04 ENCOUNTER — VIRTUAL VISIT (OUTPATIENT)
Dept: FAMILY MEDICINE | Facility: CLINIC | Age: 60
End: 2020-11-04
Payer: COMMERCIAL

## 2020-11-04 ENCOUNTER — TELEPHONE (OUTPATIENT)
Dept: FAMILY MEDICINE | Facility: CLINIC | Age: 60
End: 2020-11-04

## 2020-11-04 DIAGNOSIS — D64.9 NORMOCYTIC ANEMIA: ICD-10-CM

## 2020-11-04 DIAGNOSIS — G47.00 INSOMNIA, UNSPECIFIED TYPE: Primary | ICD-10-CM

## 2020-11-04 PROCEDURE — 99214 OFFICE O/P EST MOD 30 MIN: CPT | Mod: 95 | Performed by: INTERNAL MEDICINE

## 2020-11-04 RX ORDER — TRAZODONE HYDROCHLORIDE 50 MG/1
50 TABLET, FILM COATED ORAL AT BEDTIME
Qty: 90 TABLET | Refills: 3 | Status: SHIPPED | OUTPATIENT
Start: 2020-11-04 | End: 2021-11-10

## 2020-11-04 NOTE — PATIENT INSTRUCTIONS
Stop the Seroquel morning dose now and continue the Seroquel at 100mg at night for two days.  Then in two days, decrease the Seroquel evening dose to 50mg at night for two nights, and then stop.  Then you can restart the trazodone once you are off the Seroquel.

## 2020-11-04 NOTE — TELEPHONE ENCOUNTER
Can someone please contact Abhijeet's home care agency to see if an RN would be able to go over to his house sooner to help him with the following med change:    Stop the Seroquel morning dose now and continue the Seroquel at 100mg at night for two days.  Then in two days, decrease the Seroquel evening dose to 50mg at night for two nights, and then stop.  Then you can restart the trazodone once you are off the Seroquel.     Can the RN also draw a hemoglobin level when they go out for the next visit please?    Thanks,  Chidi Valenzuela MD

## 2020-11-04 NOTE — PROGRESS NOTES
"Abhijeet Mccauley is a 59 year old male who is being evaluated via a billable telephone visit.      The patient has been notified of following:     \"This telephone visit will be conducted via a call between you and your physician/provider. We have found that certain health care needs can be provided without the need for a physical exam.  This service lets us provide the care you need with a short phone conversation.  If a prescription is necessary we can send it directly to your pharmacy.  If lab work is needed we can place an order for that and you can then stop by our lab to have the test done at a later time.    Telephone visits are billed at different rates depending on your insurance coverage. During this emergency period, for some insurers they may be billed the same as an in-person visit.  Please reach out to your insurance provider with any questions.    If during the course of the call the physician/provider feels a telephone visit is not appropriate, you will not be charged for this service.\"    Patient has given verbal consent for Telephone visit?  Yes    What phone number would you like to be contacted at? [phone]    How would you like to obtain your AVS? Mail a copy    Subjective     Abhijeet Mccauley is a 59 year old male who presents via phone visit today for the following health issues:    HPI     Patient has an appointment today with provider though just stated it was to talk to provider.  He cannot come in to the clinic and won't come into the clinic until after 11/17 as its deer hunting season and will be gone.  When asked what his appointment was about today, it was to just discuss his meds that he taking.  Patient states he is still taking all his medications, though doesn't know which is which.  His sleeping medications that were given last time don't seem to be helping.  He would rather keep taking his Trazodone.  Patient stated \"He is going to die anyways because of his liver so why " "don't I just keep taking them to help me anyways\".      I saw Abhijeet last on 9/23.  Regarding the trazodone and Seroquel: \"He has been using Seroquel and trazodone together- discussed that we should just stick with one of these meds.  We will try increasing the Seroquel night time dose from 50mg to 100mg to see if that is more effective for sleep.  Mood has been good recently.\"    Today, he reports that since started Seroquel, he has not had any energy and is making him more depressed.  He would like to stop this and go back to the trazodone.      He is having blood in the stools again recently.  He is wondering if home care RN can check his hemoglobin.            Review of Systems   Constitutional, psych, GI systems are negative, except as otherwise noted.       Objective          Vitals:  No vitals were obtained today due to virtual visit.    alert and no distress  PSYCH: Alert and oriented times 3; coherent speech, normal   rate and volume, able to articulate logical thoughts, able   to abstract reason, no tangential thoughts, no hallucinations   or delusions  His affect is somewhat agitated  RESP: No cough, no audible wheezing, able to talk in full sentences  Remainder of exam unable to be completed due to telephone visits          Assessment/Plan:    Assessment & Plan     Insomnia, unspecified type    Seroquel is not working for sleep and he reports feeling more depressed on this.  We'll taper off and resume the trazodone as he felt this was more effective.    - traZODone (DESYREL) 50 MG tablet; Take 1 tablet (50 mg) by mouth At Bedtime    Normocytic anemia    He reports noticing some blood in the stool again and would like his home care RN to draw a Hgb level.  We will contact home care.    - Hemoglobin; Future     Follow-up for pre-op visit in 2 weeks as scheduled.    Chidi Valenzuela MD  [facility]    Phone call duration:  8 minutes              "

## 2020-11-05 ENCOUNTER — MEDICAL CORRESPONDENCE (OUTPATIENT)
Dept: HEALTH INFORMATION MANAGEMENT | Facility: CLINIC | Age: 60
End: 2020-11-05

## 2020-11-05 ENCOUNTER — TELEPHONE (OUTPATIENT)
Dept: FAMILY MEDICINE | Facility: CLINIC | Age: 60
End: 2020-11-05

## 2020-11-09 ENCOUNTER — TELEPHONE (OUTPATIENT)
Dept: FAMILY MEDICINE | Facility: CLINIC | Age: 60
End: 2020-11-09

## 2020-11-09 NOTE — TELEPHONE ENCOUNTER
Reason for Call:  Other FYI    Detailed comments: HC RN tried to draw Abhijeet's blood for Hemoglobin, blood did not fill completely, he did not want nurse to redraw blood, sais he will have it done next time he sees Dr. Valenzuela    Phone Number Patient can be reached at: Other phone number:  [employer] [facility], [phone]    Best Time: Any    Can we leave a detailed message on this number? YES    Call taken on 11/9/2020 at 1:57 PM by Lia Montalvo

## 2020-11-10 ENCOUNTER — MEDICAL CORRESPONDENCE (OUTPATIENT)
Dept: HEALTH INFORMATION MANAGEMENT | Facility: CLINIC | Age: 60
End: 2020-11-10

## 2020-11-10 ENCOUNTER — TELEPHONE (OUTPATIENT)
Dept: FAMILY MEDICINE | Facility: CLINIC | Age: 60
End: 2020-11-10

## 2020-11-10 DIAGNOSIS — F10.280 ALCOHOL DEPENDENCE WITH ALCOHOL-INDUCED ANXIETY DISORDER (H): ICD-10-CM

## 2020-11-10 RX ORDER — RIFAXIMIN 550 MG/1
TABLET ORAL
Qty: 60 TABLET | Refills: 0 | OUTPATIENT
Start: 2020-11-10

## 2020-11-10 NOTE — TELEPHONE ENCOUNTER
Plan of Care 10/12/2020-12/10/2020    Signed, faxed to [phone] and sent to [employer]. Lia Montalvo on 11/10/2020 at 9:46 AM

## 2020-11-10 NOTE — TELEPHONE ENCOUNTER
Jenise informed.    She says that pt is refusing redraw with home care.  Pt plans to get lab drawn when seen again in clinic next week on 11/47/20.    Also, pt is refusing to take melatonin.    Zulema Gramajo RN

## 2020-11-12 DIAGNOSIS — Z11.59 ENCOUNTER FOR SCREENING FOR OTHER VIRAL DISEASES: Primary | ICD-10-CM

## 2020-11-12 NOTE — DISCHARGE SUMMARY
Speech Therapy Discharge Note     S - Patient was seen for ST evaluation and recommended for treatment program. Patient did not schedule follow up treatment visits. Current status unknown.      O/A - Last visit entry in Psychiatric will serve as objective information for discharge.      P - DC speech therapy due to Patient did not schedule more visits.

## 2020-11-13 ENCOUNTER — TELEPHONE (OUTPATIENT)
Dept: FAMILY MEDICINE | Facility: CLINIC | Age: 60
End: 2020-11-13

## 2020-11-13 ENCOUNTER — MEDICAL CORRESPONDENCE (OUTPATIENT)
Dept: HEALTH INFORMATION MANAGEMENT | Facility: CLINIC | Age: 60
End: 2020-11-13

## 2020-11-13 NOTE — TELEPHONE ENCOUNTER
Medication Changes-Seroquel-stop am dose, continue 100 mg po x2 nights; then decrease Seroquel to 50 mg po x2 nights, then stop. Patient may then restart his Trazodone after that.  Signed, faxed to [phone] and sent to [employer]. Lia Montalvo on 11/13/2020 at 8:44 AM

## 2020-11-17 ENCOUNTER — TELEPHONE (OUTPATIENT)
Dept: FAMILY MEDICINE | Facility: CLINIC | Age: 60
End: 2020-11-17

## 2020-11-19 DIAGNOSIS — N39.0 URINARY TRACT INFECTION: Primary | ICD-10-CM

## 2020-11-19 NOTE — TELEPHONE ENCOUNTER
FUTURE VISIT INFORMATION      SURGERY INFORMATION:    Date: 20    Location: [facility]    Surgeon:  Adrian Hale MD    Anesthesia Type:  general    Procedure: LEFT CYSTOURETEROSCOPY, WITH RETROGRADE PYELOGRAM, HOLMIUM LASER LITHOTRIPSY OF URETERAL CALCULUS, AND STENT INSERTION    RECORDS REQUESTED FROM:       Primary Care Provider: Chidi Valenzuela MD- [facility]    Pertinent Medical History: hypertension    Most recent EKG+ Tracin20    Most recent ECHO: 20    Most recent Cardiac Stress Test: 19

## 2020-11-19 NOTE — TELEPHONE ENCOUNTER
----- Message from Jayla Sparks RN sent at 11/19/2020  4:15 PM CST -----  I've tried to call him several times today. His mail box is full and I can't leave a message. I'll keep trying tomorrow. His pre-op isn't until the day before surgery. We can get him on for a Virtual visit with PAC tomorrow if I can get a hold of him.   ----- Message -----  From: Adrian Hale MD  Sent: 11/18/2020  10:39 PM CST  To: Jayla Sparks RN    Hi - Any idea if this patient is getting a preop soon?  I would like him to update a ucx asap.  Lets also start him on levaquin 500 mg PO daily starting Friday x 7 days please.    Thanks

## 2020-11-20 ENCOUNTER — TELEPHONE (OUTPATIENT)
Dept: UROLOGY | Facility: CLINIC | Age: 60
End: 2020-11-20

## 2020-11-20 ENCOUNTER — TELEPHONE (OUTPATIENT)
Dept: FAMILY MEDICINE | Facility: CLINIC | Age: 60
End: 2020-11-20

## 2020-11-20 ENCOUNTER — MEDICAL CORRESPONDENCE (OUTPATIENT)
Dept: HEALTH INFORMATION MANAGEMENT | Facility: CLINIC | Age: 60
End: 2020-11-20

## 2020-11-20 ENCOUNTER — PRE VISIT (OUTPATIENT)
Dept: SURGERY | Facility: CLINIC | Age: 60
End: 2020-11-20

## 2020-11-20 DIAGNOSIS — N39.0 URINARY TRACT INFECTION: Primary | ICD-10-CM

## 2020-11-20 RX ORDER — LEVOFLOXACIN 500 MG/1
500 TABLET, FILM COATED ORAL DAILY
Qty: 7 TABLET | Refills: 0 | Status: SHIPPED | OUTPATIENT
Start: 2020-11-20 | End: 2020-11-27

## 2020-11-20 NOTE — TELEPHONE ENCOUNTER
I called and spoke with Abhijeet and he will have a Virtual visit today with PAC. He is having his Covid test tomorrow. He will start Levuin today for surgery next week.

## 2020-11-21 DIAGNOSIS — Z11.59 ENCOUNTER FOR SCREENING FOR OTHER VIRAL DISEASES: ICD-10-CM

## 2020-11-21 DIAGNOSIS — N39.0 URINARY TRACT INFECTION: ICD-10-CM

## 2020-11-21 LAB
ALBUMIN UR-MCNC: 100 MG/DL
APPEARANCE UR: ABNORMAL
BACTERIA #/AREA URNS HPF: ABNORMAL /HPF
BILIRUB UR QL STRIP: NEGATIVE
COLOR UR AUTO: YELLOW
GLUCOSE UR STRIP-MCNC: NEGATIVE MG/DL
HGB UR QL STRIP: ABNORMAL
KETONES UR STRIP-MCNC: NEGATIVE MG/DL
LEUKOCYTE ESTERASE UR QL STRIP: ABNORMAL
NITRATE UR QL: NEGATIVE
NON-SQ EPI CELLS #/AREA URNS LPF: ABNORMAL /LPF
PH UR STRIP: 6 PH (ref 5–7)
RBC #/AREA URNS AUTO: ABNORMAL /HPF
SOURCE: ABNORMAL
SP GR UR STRIP: 1.01 (ref 1–1.03)
UROBILINOGEN UR STRIP-ACNC: 0.2 EU/DL (ref 0.2–1)
WBC #/AREA URNS AUTO: >100 /HPF

## 2020-11-21 PROCEDURE — U0003 INFECTIOUS AGENT DETECTION BY NUCLEIC ACID (DNA OR RNA); SEVERE ACUTE RESPIRATORY SYNDROME CORONAVIRUS 2 (SARS-COV-2) (CORONAVIRUS DISEASE [COVID-19]), AMPLIFIED PROBE TECHNIQUE, MAKING USE OF HIGH THROUGHPUT TECHNOLOGIES AS DESCRIBED BY CMS-2020-01-R: HCPCS | Performed by: UROLOGY

## 2020-11-21 PROCEDURE — 87086 URINE CULTURE/COLONY COUNT: CPT | Performed by: UROLOGY

## 2020-11-21 PROCEDURE — 81001 URINALYSIS AUTO W/SCOPE: CPT | Performed by: UROLOGY

## 2020-11-22 DIAGNOSIS — F10.280 ALCOHOL DEPENDENCE WITH ALCOHOL-INDUCED ANXIETY DISORDER (H): ICD-10-CM

## 2020-11-22 LAB
BACTERIA SPEC CULT: NO GROWTH
Lab: NORMAL
SARS-COV-2 RNA SPEC QL NAA+PROBE: NOT DETECTED
SPECIMEN SOURCE: NORMAL
SPECIMEN SOURCE: NORMAL

## 2020-11-22 NOTE — LETTER
[facility]  [address] [address]  Phone: [phone]       November 25, 2020         Abhijeet Mccauley  [address] [place] [id number]            Dear Abhijeet:    We are concerned about your health care.  We recently provided you with medication refills.  Many medications require routine follow-up with your doctor.    Your prescription(s) have been refilled for 1 month so you may have time for the above noted follow-up. Please call to schedule soon so we can assure you have an appointment before your next refills are needed.    Thank you,      Elodia Valenzuela MD / [facility]

## 2020-11-23 NOTE — TELEPHONE ENCOUNTER
Requested Prescriptions   Pending Prescriptions Disp Refills     XIFAXAN 550 MG TABS tablet [Pharmacy Med Name: XIFAXAN 550MG TABS] 60 tablet 0     Sig: TAKE ONE TABLET BY MOUTH TWICE A DAY       There is no refill protocol information for this order

## 2020-11-24 ENCOUNTER — PATIENT OUTREACH (OUTPATIENT)
Dept: UROLOGY | Facility: CLINIC | Age: 60
End: 2020-11-24

## 2020-11-24 ENCOUNTER — DOCUMENTATION ONLY (OUTPATIENT)
Dept: UROLOGY | Facility: CLINIC | Age: 60
End: 2020-11-24

## 2020-11-24 RX ORDER — RIFAXIMIN 550 MG/1
TABLET ORAL
Qty: 60 TABLET | Refills: 0 | Status: SHIPPED | OUTPATIENT
Start: 2020-11-24 | End: 2020-12-17

## 2020-11-24 NOTE — TELEPHONE ENCOUNTER
I called and spoke with patient to encourage him not to cancel surgery tomorrow and to go have his pre-op appointment this after. I explained that we spoke last week and he's all set to go except the pre-op which is today at 2pm. He had is urine culture which resulted no growth. Covid test was done that was negative. He started antibiotics that were order by Dr. Hale in preparation for surgery. He said there is not way he can arrange transportation for pre-op today or surgery tomorrow. He said he is not mentally ready for the surgery tomorrow and is not coming. Jayla Sparks RN

## 2020-11-24 NOTE — PROGRESS NOTES
Spoke to patient. He called noting that he is not prepared for surgery tomorrow.  Our office has been working with him the last week on this and he has negative covid test, on abx, and PAC visit this afternoon.  Strongly encouraged him to reconsider as I am concerned his stent is at increasing risk of encrustation.  He is unable to proceed at this time.  Will plan to try to expedie rescheduling.

## 2020-11-24 NOTE — TELEPHONE ENCOUNTER
Routing refill request to provider for review/approval because:  Drug not on the [facility] refill protocol     Kierra Lewis RN

## 2020-11-25 ENCOUNTER — TELEPHONE (OUTPATIENT)
Dept: UROLOGY | Facility: CLINIC | Age: 60
End: 2020-11-25

## 2020-11-25 DIAGNOSIS — Z11.59 ENCOUNTER FOR SCREENING FOR OTHER VIRAL DISEASES: Primary | ICD-10-CM

## 2020-11-25 NOTE — TELEPHONE ENCOUNTER
Refill sent.  He cancelled preop appt with me today and is going to delay his surgery that is scheduled for tomorrow.  He should schedule a follow-up visit with me in the near future.    Thanks,  Chidi Valenzuela MD

## 2020-11-25 NOTE — TELEPHONE ENCOUNTER
Patient is scheduled for surgery with Dr. Hlae      Spoke or left message with: spoke with Abhijeet    Date of Surgery: 12/4/2020    Location: [place]    Informed patient they will need an adult  yes    Pre-op with surgeon (if applicable): n/a    H&P: Scheduled with pcp    Additional imaging/appointments: n/a    Surgery packet: Mailed via [email or web address] on 11/25/2020     Additional comments: COVID test scheduled at [facility] on 12/1/2020

## 2020-11-27 ENCOUNTER — MEDICAL CORRESPONDENCE (OUTPATIENT)
Dept: HEALTH INFORMATION MANAGEMENT | Facility: CLINIC | Age: 60
End: 2020-11-27

## 2020-11-27 ENCOUNTER — TELEPHONE (OUTPATIENT)
Dept: FAMILY MEDICINE | Facility: CLINIC | Age: 60
End: 2020-11-27

## 2020-12-01 DIAGNOSIS — Z11.59 ENCOUNTER FOR SCREENING FOR OTHER VIRAL DISEASES: ICD-10-CM

## 2020-12-01 PROCEDURE — U0003 INFECTIOUS AGENT DETECTION BY NUCLEIC ACID (DNA OR RNA); SEVERE ACUTE RESPIRATORY SYNDROME CORONAVIRUS 2 (SARS-COV-2) (CORONAVIRUS DISEASE [COVID-19]), AMPLIFIED PROBE TECHNIQUE, MAKING USE OF HIGH THROUGHPUT TECHNOLOGIES AS DESCRIBED BY CMS-2020-01-R: HCPCS | Performed by: UROLOGY

## 2020-12-02 ENCOUNTER — HOSPITAL ENCOUNTER (OUTPATIENT)
Facility: CLINIC | Age: 60
Discharge: HOME | End: 2020-12-02
Attending: FAMILY MEDICINE | Admitting: FAMILY MEDICINE
Payer: COMMERCIAL

## 2020-12-02 ENCOUNTER — TELEPHONE (OUTPATIENT)
Dept: FAMILY MEDICINE | Facility: CLINIC | Age: 60
End: 2020-12-02

## 2020-12-02 ENCOUNTER — OFFICE VISIT (OUTPATIENT)
Dept: FAMILY MEDICINE | Facility: CLINIC | Age: 60
End: 2020-12-02
Payer: COMMERCIAL

## 2020-12-02 VITALS
HEART RATE: 84 BPM | TEMPERATURE: 96.8 F | SYSTOLIC BLOOD PRESSURE: 102 MMHG | BODY MASS INDEX: 30.42 KG/M2 | HEIGHT: 65 IN | WEIGHT: 182.6 LBS | DIASTOLIC BLOOD PRESSURE: 46 MMHG | OXYGEN SATURATION: 99 %

## 2020-12-02 DIAGNOSIS — Z01.818 PREOP GENERAL PHYSICAL EXAM: Primary | ICD-10-CM

## 2020-12-02 DIAGNOSIS — I10 ESSENTIAL HYPERTENSION: ICD-10-CM

## 2020-12-02 DIAGNOSIS — I85.10 SECONDARY ESOPHAGEAL VARICES WITHOUT BLEEDING (H): ICD-10-CM

## 2020-12-02 DIAGNOSIS — N18.32 STAGE 3B CHRONIC KIDNEY DISEASE (H): ICD-10-CM

## 2020-12-02 DIAGNOSIS — D64.9 NORMOCYTIC ANEMIA: ICD-10-CM

## 2020-12-02 DIAGNOSIS — K70.30 ALCOHOLIC CIRRHOSIS, UNSPECIFIED WHETHER ASCITES PRESENT (H): ICD-10-CM

## 2020-12-02 DIAGNOSIS — N20.0 KIDNEY STONE: ICD-10-CM

## 2020-12-02 LAB
ANION GAP SERPL CALCULATED.3IONS-SCNC: 7 MMOL/L (ref 3–14)
ANISOCYTOSIS BLD QL SMEAR: ABNORMAL
BASOPHILS # BLD AUTO: 0.1 10E9/L (ref 0–0.2)
BASOPHILS NFR BLD AUTO: 0.9 %
BUN SERPL-MCNC: 21 MG/DL (ref 7–30)
CALCIUM SERPL-MCNC: 9 MG/DL (ref 8.5–10.1)
CHLORIDE SERPL-SCNC: 107 MMOL/L (ref 94–109)
CO2 SERPL-SCNC: 21 MMOL/L (ref 20–32)
CREAT SERPL-MCNC: 1.96 MG/DL (ref 0.66–1.25)
DIFFERENTIAL METHOD BLD: ABNORMAL
EOSINOPHIL # BLD AUTO: 0.3 10E9/L (ref 0–0.7)
EOSINOPHIL NFR BLD AUTO: 3.3 %
ERYTHROCYTE [DISTWIDTH] IN BLOOD BY AUTOMATED COUNT: 18.2 % (ref 10–15)
GFR SERPL CREATININE-BSD FRML MDRD: 36 ML/MIN/{1.73_M2}
GLUCOSE SERPL-MCNC: 102 MG/DL (ref 70–99)
HCT VFR BLD AUTO: 22.7 % (ref 40–53)
HGB BLD-MCNC: 6.6 G/DL (ref 13.3–17.7)
IMM GRANULOCYTES # BLD: 0.1 10E9/L (ref 0–0.4)
IMM GRANULOCYTES NFR BLD: 0.6 %
LYMPHOCYTES # BLD AUTO: 0.8 10E9/L (ref 0.8–5.3)
LYMPHOCYTES NFR BLD AUTO: 9.2 %
MCH RBC QN AUTO: 24.9 PG (ref 26.5–33)
MCHC RBC AUTO-ENTMCNC: 29.1 G/DL (ref 31.5–36.5)
MCV RBC AUTO: 86 FL (ref 78–100)
MONOCYTES # BLD AUTO: 1.9 10E9/L (ref 0–1.3)
MONOCYTES NFR BLD AUTO: 21 %
NEUTROPHILS # BLD AUTO: 5.9 10E9/L (ref 1.6–8.3)
NEUTROPHILS NFR BLD AUTO: 65 %
NRBC # BLD AUTO: 0 10*3/UL
NRBC BLD AUTO-RTO: 0 /100
PLATELET # BLD AUTO: 129 10E9/L (ref 150–450)
PLATELET # BLD EST: ABNORMAL 10*3/UL
POTASSIUM SERPL-SCNC: 3.5 MMOL/L (ref 3.4–5.3)
RBC # BLD AUTO: 2.65 10E12/L (ref 4.4–5.9)
SARS-COV-2 RNA SPEC QL NAA+PROBE: NOT DETECTED
SODIUM SERPL-SCNC: 135 MMOL/L (ref 133–144)
SPECIMEN SOURCE: NORMAL
WBC # BLD AUTO: 9.1 10E9/L (ref 4–11)

## 2020-12-02 PROCEDURE — 93000 ELECTROCARDIOGRAM COMPLETE: CPT | Performed by: FAMILY MEDICINE

## 2020-12-02 PROCEDURE — 86850 RBC ANTIBODY SCREEN: CPT | Performed by: FAMILY MEDICINE

## 2020-12-02 PROCEDURE — 86901 BLOOD TYPING SEROLOGIC RH(D): CPT | Performed by: FAMILY MEDICINE

## 2020-12-02 PROCEDURE — 36415 COLL VENOUS BLD VENIPUNCTURE: CPT | Performed by: FAMILY MEDICINE

## 2020-12-02 PROCEDURE — 86900 BLOOD TYPING SEROLOGIC ABO: CPT | Performed by: FAMILY MEDICINE

## 2020-12-02 PROCEDURE — 85025 COMPLETE CBC W/AUTO DIFF WBC: CPT | Performed by: FAMILY MEDICINE

## 2020-12-02 PROCEDURE — 80048 BASIC METABOLIC PNL TOTAL CA: CPT | Performed by: FAMILY MEDICINE

## 2020-12-02 PROCEDURE — 86922 COMPATIBILITY TEST ANTIGLOB: CPT | Performed by: FAMILY MEDICINE

## 2020-12-02 PROCEDURE — 99215 OFFICE O/P EST HI 40 MIN: CPT | Performed by: FAMILY MEDICINE

## 2020-12-02 ASSESSMENT — MIFFLIN-ST. JEOR: SCORE: 1561.4

## 2020-12-02 NOTE — TELEPHONE ENCOUNTER
Reason for call:    Symptom or request:     Patient called stating that he had a preop done today 12/2- Dr Marrero, has a infusion schedule for tomorrow due to lab work that was done today. He was asked to call his surgeon, to see if surgeon would still want to do surgery.      Please note it was hard to follow what he was saying, he was very impatient with me.     Best Time:  in about 1 hour he is work on getting transportation to infusion tomorrow. After 230p--he does not drive.     Can we leave a detailed message on this number?  YES     Karlee NAILS  Station
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
## 2020-12-02 NOTE — TELEPHONE ENCOUNTER
Patient had pre-op today for a surgery 12-4-20 for a kidney stone.  He states he is to have an infusion tomorrow because CBC results.  He states concern he may not be able to have surgery on Friday?  Is he to repeat labs prior to surgery on Friday?  OV notes from today are not yet complete.    Routing to provider.  Fatou ESTRADA MSN, RN

## 2020-12-02 NOTE — PATIENT INSTRUCTIONS
"Hold lasix morning of surgery   No NSAIDS  Will follow up with lab results.   Surgery pending lab results.     Preparing for Your Surgery  Getting started  A surgery nurse will call you to review your health history and instructions. They will give you an arrival time based on your scheduled surgery time.  Please be ready to share the following:    Your doctor's clinic name and phone number    Your medical, surgical and anesthesia history    A list of allergies and sensitivities    A list of medicines, including herbal treatments and over-the-counter drugs    Whether the patient has a legal guardian (ask how to send us the papers in advance)  If your child is having surgery, please ask for a copy of Preparing for Your Child's Surgery.    Preparing for surgery    Within 30 days of surgery: Have an exam at your family clinic (primary care clinic), or go to a pre-operative clinic. This exam is called a \"History and Physical,\" or H&P.    At your H&P exam, talk to your care team about all medicines you take. If you need to stop any medicines before surgery, ask when to start taking them again.  ? We do this for your safety. Many medicines can make you bleed too much during surgery. Some change how well surgery (anesthesia) drugs work.    Call your insurance company to see what it will and won't pay for. Ask if they need to pre-approve the surgery. (If no insurance, call [phone].)    Call your surgeon's clinic if there's any change in your health. This includes signs of a cold or flu (sore throat, runny nose, cough, rash, fever). It also includes a scrape or scratch near the surgery site.    If you have questions on the day of surgery, call your surgery center.  Eating and drinking guidelines  For your safety: Unless your surgeon tells you otherwise, follow the guidelines below.    Eat and drink as usual until 8 hours before surgery. After that, no food or milk.    Drink clear liquids until 2 hours before surgery. " These are liquids you can see through, like water, Gatorade and Propel Water. You may also have black coffee and tea (no cream or milk).    Nothing by mouth within 2 hours of surgery. This includes gum, candy and breath mints.    Stop alcohol the midnight before surgery.    If your family clinic tells you to take medicine on the morning of surgery, it's okay to take it with a sip of water.  Preventing infection    Shower or bathe the night before and morning of your surgery. Follow the instructions your clinic gave you. (If no instructions, use regular soap.)    Don't shave or clip hair near your surgery site. This can lead to skin infection.    Don't smoke the morning of surgery. Smoking increases the risk of infection. You may chew nicotine gum up to 2 hours before surgery. A nicotine patch is okay.  ? Note: Some surgeries require you to completely quit smoking and nicotine. Check with your surgeon.    Your care team will make every effort to keep you safe from infection. We will:  ? Clean our hands often with soap and water (or an alcohol-based hand rub).  ? Clean the skin at your surgery site with a special soap that kills germs. We'll also remove hair from the site as needed.  ? Wear special hair covers, masks, gowns and gloves during surgery.  ? Give antibiotic medicine, if prescribed. Not all surgeries need antibiotics.  What to bring on the day of surgery    Photo ID and insurance card    Copy of your health care directive, if you have one    Glasses and hearing aides (bring cases)  ? You can't wear contacts during surgery    Inhaler and eye drops, if you use them (tell us about these when you arrive)    CPAP machine or breathing device, if you use them    A few personal items, if spending the night    If you have . . .  ? A pacemaker or ICD (cardiac defibrillator): Bring the ID card.  ? An implanted stimulator: Bring the remote control.  ? A legal guardian: Bring a copy of the certified (court-stamped)  guardianship papers.  Please remove any jewelry, including body piercings. Leave jewelry and other valuables at home.  If you're going home the day of surgery  Important: If you don't follow the rules below, we must cancel your surgery.     Arrange for someone to drive you home after surgery. You may not drive, take a taxi or take public transportation by yourself (unless you'll have local anesthesia only).    Arrange for a responsible adult to stay with you overnight. If you don't, we may keep you in the hospital overnight, and you may need to pay the costs yourself.  Questions?   If you have any questions for your care team, list them here: _________________________________________________________________________________________________________________________________________________________________________________________________________________________________________________________________________________________________________________________  For informational purposes only. Not to replace the advice of your health care provider. Copyright   2828-5220 [employer]. All rights reserved. Clinically reviewed by Shanika Lou MD. [employer] 609905 - REV 07/19.

## 2020-12-03 ENCOUNTER — INFUSION THERAPY VISIT (OUTPATIENT)
Dept: INFUSION THERAPY | Facility: CLINIC | Age: 60
End: 2020-12-03
Attending: FAMILY MEDICINE
Payer: COMMERCIAL

## 2020-12-03 ENCOUNTER — HOSPITAL ENCOUNTER (OUTPATIENT)
Facility: CLINIC | Age: 60
Discharge: HOME | End: 2020-12-03
Attending: FAMILY MEDICINE | Admitting: FAMILY MEDICINE
Payer: COMMERCIAL

## 2020-12-03 ENCOUNTER — ANESTHESIA EVENT (OUTPATIENT)
Dept: SURGERY | Facility: CLINIC | Age: 60
End: 2020-12-03
Payer: COMMERCIAL

## 2020-12-03 ENCOUNTER — TELEPHONE (OUTPATIENT)
Dept: FAMILY MEDICINE | Facility: CLINIC | Age: 60
End: 2020-12-03

## 2020-12-03 ENCOUNTER — PATIENT OUTREACH (OUTPATIENT)
Dept: CARE COORDINATION | Facility: CLINIC | Age: 60
End: 2020-12-03

## 2020-12-03 ENCOUNTER — MEDICAL CORRESPONDENCE (OUTPATIENT)
Dept: HEALTH INFORMATION MANAGEMENT | Facility: CLINIC | Age: 60
End: 2020-12-03

## 2020-12-03 VITALS
SYSTOLIC BLOOD PRESSURE: 126 MMHG | TEMPERATURE: 97.2 F | RESPIRATION RATE: 20 BRPM | DIASTOLIC BLOOD PRESSURE: 57 MMHG | HEART RATE: 78 BPM | OXYGEN SATURATION: 98 %

## 2020-12-03 DIAGNOSIS — D64.9 NORMOCYTIC ANEMIA: Primary | ICD-10-CM

## 2020-12-03 LAB
ERYTHROCYTE [DISTWIDTH] IN BLOOD BY AUTOMATED COUNT: 17.3 % (ref 10–15)
HCT VFR BLD AUTO: 31 % (ref 40–53)
HGB BLD-MCNC: 9.3 G/DL (ref 13.3–17.7)
MCH RBC QN AUTO: 25.5 PG (ref 26.5–33)
MCHC RBC AUTO-ENTMCNC: 30 G/DL (ref 31.5–36.5)
MCV RBC AUTO: 85 FL (ref 78–100)
PLATELET # BLD AUTO: 134 10E9/L (ref 150–450)
RBC # BLD AUTO: 3.64 10E12/L (ref 4.4–5.9)
WBC # BLD AUTO: 10.4 10E9/L (ref 4–11)

## 2020-12-03 PROCEDURE — 36430 TRANSFUSION BLD/BLD COMPNT: CPT

## 2020-12-03 PROCEDURE — 85027 COMPLETE CBC AUTOMATED: CPT | Performed by: FAMILY MEDICINE

## 2020-12-03 PROCEDURE — P9016 RBC LEUKOCYTES REDUCED: HCPCS

## 2020-12-03 ASSESSMENT — COPD QUESTIONNAIRES: COPD: 1

## 2020-12-03 ASSESSMENT — PAIN SCALES - GENERAL: PAINLEVEL: EXTREME PAIN (8)

## 2020-12-03 NOTE — RESULT ENCOUNTER NOTE
Chalazion    A chalazion is a blocked, swollen oil gland in the eyelid. The eyelids have oil glands that lubricate the inside of the lids. If a gland becomes blocked, the oil builds up and causes the skin to swell.   A chalazion can take several weeks to grow. It can vary in size. It may appear on the inside or outside of the lid. In most cases, it occurs on the upper lid. The skin may be a normal color or may be red. A chalazion is usually not painful, but it can cause discomfort, tenderness, sensitivity to light, eye discharge, and increased tearing.   A chalazion usually lasts from a few weeks to a month. It often goes away on its own. A chalazion can be mistaken for a sty (infection of an oil gland) because they both appear on the eyelid.  Why a chalazion forms  It’s often unclear why a chalazion appears. However, a chalazion can develop when you have any of the following conditions:  · Chronic blepharitis, when eyelids become irritated  · Acne rosacea  · Seborrhea  · Tuberculosis  · Viral infection  Home care  If your healthcare provider finds that a chalazion is infected, he or she may prescribe an antibiotic drop or ointment. Use the medicine as directed.  · Wash your hands carefully with soap and warm water before and after caring for your eye(s). This is to help prevent infection.  · Apply a warm, moist towel or compress for 10 to 15 minutes, 3 to 4 times a day. This will reduce the swelling and soften the hardened oils blocking the duct.  · Massage the area gently after applying the warm compress to help drain the chalazion. Or follow the directions given by your healthcare provider.   · Do not try to pop or squeeze the chalazion.  · Do not wear eye makeup until the chalazion has healed, or follow your healthcare provider’s directions.  · Do not wear contact lenses until the chalazion has healed, or follow your healthcare provider’s directions.  · Once a day, with eyes closed, clean your eyelids with  Spoke with patient on the telephone.  Hemoglobin increased to 9.3 from 6.6 yesterday.  Appropriate response to 2 units of PRBCs.  He is scheduled for surgery tomorrow. baby shampoo or a moist eyelid cleansing wipe. This is to help reduce clogging of the duct, as well as help prevent a chalazion from returning. Ask your health care provider about products to clean your eyelids.  Follow-up care  Follow up with your health care provider, or as advised. If the chalazion does not heal in 4 weeks, you may be referred to a healthcare provider who specializes in eye care (an optometrist or ophthalmologist) for further evaluation and treatment. You may also be referred to an eye specialist if you have a large chalazion.   When to seek medical advice  Call your health care provider right away if any of these occur:  · Chalazion returns to the same area repeatedly  · Existing symptoms (such as pain, warmth, redness, and drainage) get worse  · New symptoms appear, such as eye pain, warmth or redness around the eye, eye drainage, or both the upper and lower lids of the same eye swell  · You have visual changes or blurred vision  · You have a headache that persists   · You have a fever of 100.4ºF (38ºC) or higher, or as directed by your healthcare provider  © 2844-9769 [employer]. [address] [place] [address], [place] [address]. All rights reserved. This information is not intended as a substitute for professional medical care. Always follow your healthcare professional's instructions.

## 2020-12-03 NOTE — PROGRESS NOTES
Infusion Nursing Note:  Abhijeet Mccauley presents today for 2units rbc and lab draw.    Patient seen by provider today: No   present during visit today: Not Applicable.    Note: N/A.    Intravenous Access:  Labs drawn without difficulty.  Peripheral IV placed.    Treatment Conditions:  Lab Results   Component Value Date    HGB 9.3 12/03/2020     Lab Results   Component Value Date    WBC 10.4 12/03/2020      Lab Results   Component Value Date    ANEU 5.9 12/02/2020     Lab Results   Component Value Date     12/03/2020      Lab Results   Component Value Date     12/02/2020                   Lab Results   Component Value Date    POTASSIUM 3.5 12/02/2020           Lab Results   Component Value Date    MAG 1.9 08/16/2020            Lab Results   Component Value Date    CR 1.96 12/02/2020                   Lab Results   Component Value Date    BEE 9.0 12/02/2020                Lab Results   Component Value Date    BILITOTAL 1.0 09/16/2020           Lab Results   Component Value Date    ALBUMIN 2.6 09/16/2020                    Lab Results   Component Value Date    ALT 19 09/16/2020           Lab Results   Component Value Date    AST 43 09/16/2020       Results reviewed, labs MET treatment parameters, ok to proceed with treatment.  Blood transfusion consent signed 12/3/20.      Post Infusion Assessment:  Patient tolerated infusion without incident.  Blood return noted pre and post infusion.  Site patent and intact, free from redness, edema or discomfort.  No evidence of extravasations.  Access discontinued per protocol.       Discharge Plan:   Discharge instructions reviewed with: Patient.  Patient and/or family verbalized understanding of discharge instructions and all questions answered.  Copy of AVS reviewed with patient and/or family.  Patient will return 12/3/20 for next appointment.  Patient discharged in stable condition accompanied by: self.  Departure Mode: Ambulatory.    Marion NAILS  [facility]

## 2020-12-03 NOTE — PROGRESS NOTES
Social Work Intervention  [facility]    Data/Intervention:    Patient Name:  Abhijeet Mccauley  /Age:  1960 (59 year old)    Visit Type: telephone  Referral Source: Urology Clinic  Reason for Referral:  Patient's surgery time for tomorrow got moved up and Patient was unable to change his ride time.    Collaborated With:    -Patient  -[employer] ([phone])    Patient Concerns/Issues:   Patient's surgery time for tomorrow got moved up and Patient was unable to change his ride time. Patient's transportation is arranged through his [employer] insurance.    Intervention/Education/Resources Provided:   contacted [employer] and was able to reschedule his ride to accommodate the 5:30am time he needs to present to the hospital.     Assessment/Plan:  Patient will be picked up from home between 4:00 and 4:15am tomorrow morning and be brought to the hospital for surgery. Patient is aware of plan.       [facility]  Direct Phone: [phone]
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
## 2020-12-04 ENCOUNTER — ANESTHESIA (OUTPATIENT)
Dept: SURGERY | Facility: CLINIC | Age: 60
End: 2020-12-04
Payer: COMMERCIAL

## 2020-12-04 ENCOUNTER — HOSPITAL ENCOUNTER (OUTPATIENT)
Facility: CLINIC | Age: 60
Discharge: HOME | End: 2020-12-04
Attending: UROLOGY | Admitting: UROLOGY
Payer: COMMERCIAL

## 2020-12-04 ENCOUNTER — APPOINTMENT (OUTPATIENT)
Dept: GENERAL RADIOLOGY | Facility: CLINIC | Age: 60
End: 2020-12-04
Attending: UROLOGY
Payer: COMMERCIAL

## 2020-12-04 VITALS
BODY MASS INDEX: 29.8 KG/M2 | SYSTOLIC BLOOD PRESSURE: 150 MMHG | HEIGHT: 66 IN | RESPIRATION RATE: 16 BRPM | HEART RATE: 88 BPM | WEIGHT: 185.41 LBS | DIASTOLIC BLOOD PRESSURE: 81 MMHG | OXYGEN SATURATION: 99 % | TEMPERATURE: 97.9 F

## 2020-12-04 DIAGNOSIS — N20.0 KIDNEY STONE: ICD-10-CM

## 2020-12-04 LAB
ANION GAP SERPL CALCULATED.3IONS-SCNC: 12 MMOL/L (ref 3–14)
BUN SERPL-MCNC: 21 MG/DL (ref 7–30)
CALCIUM SERPL-MCNC: 8.7 MG/DL (ref 8.5–10.1)
CHLORIDE SERPL-SCNC: 108 MMOL/L (ref 94–109)
CO2 SERPL-SCNC: 18 MMOL/L (ref 20–32)
CREAT SERPL-MCNC: 1.79 MG/DL (ref 0.66–1.25)
ERYTHROCYTE [DISTWIDTH] IN BLOOD BY AUTOMATED COUNT: 17.7 % (ref 10–15)
GFR SERPL CREATININE-BSD FRML MDRD: 40 ML/MIN/{1.73_M2}
GLUCOSE BLDC GLUCOMTR-MCNC: 79 MG/DL (ref 70–99)
GLUCOSE SERPL-MCNC: 83 MG/DL (ref 70–99)
HCT VFR BLD AUTO: 29.9 % (ref 40–53)
HGB BLD-MCNC: 8.9 G/DL (ref 13.3–17.7)
INR PPP: 1.4 (ref 0.86–1.14)
MCH RBC QN AUTO: 25.8 PG (ref 26.5–33)
MCHC RBC AUTO-ENTMCNC: 29.8 G/DL (ref 31.5–36.5)
MCV RBC AUTO: 87 FL (ref 78–100)
PLATELET # BLD AUTO: 124 10E9/L (ref 150–450)
POTASSIUM SERPL-SCNC: 3.4 MMOL/L (ref 3.4–5.3)
RBC # BLD AUTO: 3.45 10E12/L (ref 4.4–5.9)
SODIUM SERPL-SCNC: 137 MMOL/L (ref 133–144)
WBC # BLD AUTO: 10.3 10E9/L (ref 4–11)

## 2020-12-04 PROCEDURE — 258N000003 HC RX IP 258 OP 636: Performed by: NURSE ANESTHETIST, CERTIFIED REGISTERED

## 2020-12-04 PROCEDURE — 87070 CULTURE OTHR SPECIMN AEROBIC: CPT | Performed by: UROLOGY

## 2020-12-04 PROCEDURE — 250N000011 HC RX IP 250 OP 636: Performed by: NURSE ANESTHETIST, CERTIFIED REGISTERED

## 2020-12-04 PROCEDURE — 360N000023 HC SURGERY LEVEL 3 EA 15 ADDTL MIN UMMC: Performed by: UROLOGY

## 2020-12-04 PROCEDURE — 999N001017 HC STATISTIC GLUCOSE BY METER IP

## 2020-12-04 PROCEDURE — 370N000001 HC ANESTHESIA TECHNICAL FEE, 1ST 30 MIN: Performed by: UROLOGY

## 2020-12-04 PROCEDURE — C1894 INTRO/SHEATH, NON-LASER: HCPCS | Performed by: UROLOGY

## 2020-12-04 PROCEDURE — 250N000011 HC RX IP 250 OP 636: Performed by: STUDENT IN AN ORGANIZED HEALTH CARE EDUCATION/TRAINING PROGRAM

## 2020-12-04 PROCEDURE — 999N000179 XR SURGERY CARM FLUORO LESS THAN 5 MIN W STILLS: Mod: TC

## 2020-12-04 PROCEDURE — 36415 COLL VENOUS BLD VENIPUNCTURE: CPT | Performed by: ANESTHESIOLOGY

## 2020-12-04 PROCEDURE — 999N000139 HC STATISTIC PRE-PROCEDURE ASSESSMENT II: Performed by: UROLOGY

## 2020-12-04 PROCEDURE — 250N000003 HC SEVOFLURANE, EA 15 MIN: Performed by: UROLOGY

## 2020-12-04 PROCEDURE — 85027 COMPLETE CBC AUTOMATED: CPT | Performed by: ANESTHESIOLOGY

## 2020-12-04 PROCEDURE — 250N000009 HC RX 250: Performed by: STUDENT IN AN ORGANIZED HEALTH CARE EDUCATION/TRAINING PROGRAM

## 2020-12-04 PROCEDURE — C2617 STENT, NON-COR, TEM W/O DEL: HCPCS | Performed by: UROLOGY

## 2020-12-04 PROCEDURE — 80048 BASIC METABOLIC PNL TOTAL CA: CPT | Performed by: ANESTHESIOLOGY

## 2020-12-04 PROCEDURE — 255N000002 HC RX 255 OP 636: Performed by: UROLOGY

## 2020-12-04 PROCEDURE — 761N000007 HC RECOVERY PHASE 2 EACH 15 MINS: Performed by: UROLOGY

## 2020-12-04 PROCEDURE — 250N000009 HC RX 250: Performed by: NURSE ANESTHETIST, CERTIFIED REGISTERED

## 2020-12-04 PROCEDURE — 370N000002 HC ANESTHESIA TECHNICAL FEE, EACH ADDTL 15 MIN: Performed by: UROLOGY

## 2020-12-04 PROCEDURE — 272N000001 HC OR GENERAL SUPPLY STERILE: Performed by: UROLOGY

## 2020-12-04 PROCEDURE — 250N000011 HC RX IP 250 OP 636: Performed by: UROLOGY

## 2020-12-04 PROCEDURE — 82365 CALCULUS SPECTROSCOPY: CPT | Performed by: UROLOGY

## 2020-12-04 PROCEDURE — 360N000025 HC SURGERY LEVEL 3 W FLUORO 1ST 30 MIN - UMMC: Performed by: UROLOGY

## 2020-12-04 PROCEDURE — 87106 FUNGI IDENTIFICATION YEAST: CPT | Performed by: UROLOGY

## 2020-12-04 PROCEDURE — C1769 GUIDE WIRE: HCPCS | Performed by: UROLOGY

## 2020-12-04 PROCEDURE — 761N000003 HC RECOVERY PHASE 1 LEVEL 2 FIRST HR: Performed by: UROLOGY

## 2020-12-04 PROCEDURE — 85610 PROTHROMBIN TIME: CPT | Performed by: ANESTHESIOLOGY

## 2020-12-04 DEVICE — STENT URETERAL PERCUFLEX PLUS 6FRX26CM M0061752630
Type: IMPLANTABLE DEVICE | Site: URETER | Status: NON-FUNCTIONAL
Removed: 2021-01-13

## 2020-12-04 RX ORDER — TAMSULOSIN HYDROCHLORIDE 0.4 MG/1
0.4 CAPSULE ORAL DAILY
Qty: 30 CAPSULE | Refills: 0 | Status: ON HOLD | OUTPATIENT
Start: 2020-12-04 | End: 2021-01-13

## 2020-12-04 RX ORDER — CEFAZOLIN SODIUM 2 G/100ML
2 INJECTION, SOLUTION INTRAVENOUS
Status: COMPLETED | OUTPATIENT
Start: 2020-12-04 | End: 2020-12-04

## 2020-12-04 RX ORDER — NALOXONE HYDROCHLORIDE 0.4 MG/ML
0.4 INJECTION, SOLUTION INTRAMUSCULAR; INTRAVENOUS; SUBCUTANEOUS
Status: DISCONTINUED | OUTPATIENT
Start: 2020-12-04 | End: 2020-12-04 | Stop reason: HOSPADM

## 2020-12-04 RX ORDER — PROPOFOL 10 MG/ML
INJECTION, EMULSION INTRAVENOUS PRN
Status: DISCONTINUED | OUTPATIENT
Start: 2020-12-04 | End: 2020-12-04

## 2020-12-04 RX ORDER — ONDANSETRON 2 MG/ML
4 INJECTION INTRAMUSCULAR; INTRAVENOUS EVERY 30 MIN PRN
Status: DISCONTINUED | OUTPATIENT
Start: 2020-12-04 | End: 2020-12-04 | Stop reason: HOSPADM

## 2020-12-04 RX ORDER — ESMOLOL HYDROCHLORIDE 10 MG/ML
INJECTION INTRAVENOUS PRN
Status: DISCONTINUED | OUTPATIENT
Start: 2020-12-04 | End: 2020-12-04

## 2020-12-04 RX ORDER — LIDOCAINE HYDROCHLORIDE 20 MG/ML
INJECTION, SOLUTION INFILTRATION; PERINEURAL PRN
Status: DISCONTINUED | OUTPATIENT
Start: 2020-12-04 | End: 2020-12-04

## 2020-12-04 RX ORDER — LIDOCAINE 40 MG/G
CREAM TOPICAL
Status: CANCELLED | OUTPATIENT
Start: 2020-12-04

## 2020-12-04 RX ORDER — CEFAZOLIN SODIUM 1 G/3ML
1 INJECTION, POWDER, FOR SOLUTION INTRAMUSCULAR; INTRAVENOUS SEE ADMIN INSTRUCTIONS
Status: DISCONTINUED | OUTPATIENT
Start: 2020-12-04 | End: 2020-12-04 | Stop reason: HOSPADM

## 2020-12-04 RX ORDER — SODIUM CHLORIDE, SODIUM LACTATE, POTASSIUM CHLORIDE, CALCIUM CHLORIDE 600; 310; 30; 20 MG/100ML; MG/100ML; MG/100ML; MG/100ML
INJECTION, SOLUTION INTRAVENOUS CONTINUOUS
Status: DISCONTINUED | OUTPATIENT
Start: 2020-12-04 | End: 2020-12-04 | Stop reason: HOSPADM

## 2020-12-04 RX ORDER — NALOXONE HYDROCHLORIDE 0.4 MG/ML
0.2 INJECTION, SOLUTION INTRAMUSCULAR; INTRAVENOUS; SUBCUTANEOUS
Status: DISCONTINUED | OUTPATIENT
Start: 2020-12-04 | End: 2020-12-04 | Stop reason: HOSPADM

## 2020-12-04 RX ORDER — LEVOFLOXACIN 500 MG/1
500 TABLET, FILM COATED ORAL DAILY
Qty: 7 TABLET | Refills: 0 | Status: SHIPPED | OUTPATIENT
Start: 2020-12-04 | End: 2021-01-05

## 2020-12-04 RX ORDER — FENTANYL CITRATE 50 UG/ML
INJECTION, SOLUTION INTRAMUSCULAR; INTRAVENOUS PRN
Status: DISCONTINUED | OUTPATIENT
Start: 2020-12-04 | End: 2020-12-04

## 2020-12-04 RX ORDER — SODIUM CHLORIDE, SODIUM LACTATE, POTASSIUM CHLORIDE, CALCIUM CHLORIDE 600; 310; 30; 20 MG/100ML; MG/100ML; MG/100ML; MG/100ML
INJECTION, SOLUTION INTRAVENOUS CONTINUOUS
Status: CANCELLED | OUTPATIENT
Start: 2020-12-04

## 2020-12-04 RX ORDER — ONDANSETRON 4 MG/1
4 TABLET, ORALLY DISINTEGRATING ORAL EVERY 30 MIN PRN
Status: DISCONTINUED | OUTPATIENT
Start: 2020-12-04 | End: 2020-12-04 | Stop reason: HOSPADM

## 2020-12-04 RX ORDER — PIPERACILLIN SODIUM, TAZOBACTAM SODIUM 3; .375 G/15ML; G/15ML
INJECTION, POWDER, LYOPHILIZED, FOR SOLUTION INTRAVENOUS PRN
Status: DISCONTINUED | OUTPATIENT
Start: 2020-12-04 | End: 2020-12-04

## 2020-12-04 RX ORDER — OXYCODONE HYDROCHLORIDE 5 MG/1
5 TABLET ORAL EVERY 6 HOURS PRN
Qty: 10 TABLET | Refills: 0 | Status: SHIPPED | OUTPATIENT
Start: 2020-12-04 | End: 2021-01-05

## 2020-12-04 RX ORDER — SODIUM CHLORIDE, SODIUM LACTATE, POTASSIUM CHLORIDE, CALCIUM CHLORIDE 600; 310; 30; 20 MG/100ML; MG/100ML; MG/100ML; MG/100ML
INJECTION, SOLUTION INTRAVENOUS CONTINUOUS PRN
Status: DISCONTINUED | OUTPATIENT
Start: 2020-12-04 | End: 2020-12-04

## 2020-12-04 RX ORDER — ONDANSETRON 2 MG/ML
INJECTION INTRAMUSCULAR; INTRAVENOUS PRN
Status: DISCONTINUED | OUTPATIENT
Start: 2020-12-04 | End: 2020-12-04

## 2020-12-04 RX ORDER — MEPERIDINE HYDROCHLORIDE 25 MG/ML
12.5 INJECTION INTRAMUSCULAR; INTRAVENOUS; SUBCUTANEOUS
Status: DISCONTINUED | OUTPATIENT
Start: 2020-12-04 | End: 2020-12-04 | Stop reason: HOSPADM

## 2020-12-04 RX ADMIN — PROPOFOL 170 MG: 10 INJECTION, EMULSION INTRAVENOUS at 09:50

## 2020-12-04 RX ADMIN — PROPOFOL 20 MG: 10 INJECTION, EMULSION INTRAVENOUS at 11:35

## 2020-12-04 RX ADMIN — FENTANYL CITRATE 50 MCG: 50 INJECTION, SOLUTION INTRAMUSCULAR; INTRAVENOUS at 09:33

## 2020-12-04 RX ADMIN — SODIUM CHLORIDE, POTASSIUM CHLORIDE, SODIUM LACTATE AND CALCIUM CHLORIDE: 600; 310; 30; 20 INJECTION, SOLUTION INTRAVENOUS at 09:24

## 2020-12-04 RX ADMIN — ROCURONIUM BROMIDE 50 MG: 10 INJECTION INTRAVENOUS at 09:50

## 2020-12-04 RX ADMIN — CEFAZOLIN 2 G: 10 INJECTION, POWDER, FOR SOLUTION INTRAVENOUS at 10:00

## 2020-12-04 RX ADMIN — FENTANYL CITRATE 150 MCG: 50 INJECTION, SOLUTION INTRAMUSCULAR; INTRAVENOUS at 09:50

## 2020-12-04 RX ADMIN — SUGAMMADEX 200 MG: 100 INJECTION, SOLUTION INTRAVENOUS at 11:38

## 2020-12-04 RX ADMIN — ONDANSETRON 4 MG: 2 INJECTION INTRAMUSCULAR; INTRAVENOUS at 11:33

## 2020-12-04 RX ADMIN — FENTANYL CITRATE 50 MCG: 50 INJECTION, SOLUTION INTRAMUSCULAR; INTRAVENOUS at 11:35

## 2020-12-04 RX ADMIN — ESMOLOL HYDROCHLORIDE 20 MG: 10 INJECTION, SOLUTION INTRAVENOUS at 11:39

## 2020-12-04 RX ADMIN — MIDAZOLAM 2 MG: 1 INJECTION INTRAMUSCULAR; INTRAVENOUS at 09:24

## 2020-12-04 RX ADMIN — LIDOCAINE HYDROCHLORIDE 100 MG: 20 INJECTION, SOLUTION INFILTRATION; PERINEURAL at 09:50

## 2020-12-04 RX ADMIN — PIPERACILLIN AND TAZOBACTAM 3.38 G: 3; .375 INJECTION, POWDER, FOR SOLUTION INTRAVENOUS at 10:40

## 2020-12-04 ASSESSMENT — MIFFLIN-ST. JEOR: SCORE: 1588.75

## 2020-12-04 NOTE — BRIEF OP NOTE
[facility]     Brief Operative Note    Pre-operative diagnosis: Kidney stone [N20.0]  Post-operative diagnosis Same as pre-operative diagnosis    Procedure: Procedure(s):  LEFT CYSTOURETEROSCOPY, WITH RETROGRADE PYELOGRAM, HOLMIUM LASER LITHOTRIPSY OF URETERAL CALCULUS, AND STENT INSERTION  Surgeon: Surgeon(s) and Role:     * Adrian Hale MD - Primary     * Amol Baig MD - Resident - Assisting  Anesthesia: General   Estimated blood loss: 10cc  Drains:  6 x 26 Double J Ureteral Stent Left Side  Specimens:   ID Type Source Tests Collected by Time Destination   1 : left kidney stone  Calculus/Stone Other MISCELLANEOUS CULTURE AEROBIC BACTERIAL, STONE ANALYSIS Adrian Hale MD 12/4/2020 11:30 AM      Findings:   Large left upper pole stone with soft/fibrinous tissue along periphery and center of stone. Blunted calyces of left upper pole collecting system.  Complications: None.  Implants:   Implant Name Type Inv. Item Serial No.  Lot No. LRB No. Used Action   STENT URETERAL PERCUFLEX PLUS [id number] Stent STENT URETERAL PERCUFLEX PLUS [id number]  [employer] [id number] Left 1 Explanted   STENT URETERAL PERCUFLEX PLUS [id number] Stent STENT URETERAL PERCUFLEX PLUS [id number]  [employer] [id number] Left 1 Implanted       PLAN:  1) PACU  2) Continue Levofloxacin x1 week  3) Oxycodone, Tamsulosin for stent discomfort  4) Schedule for second look ureteroscopy

## 2020-12-04 NOTE — OR NURSING
Dr Behrens was called for sign out of PACU- he agrees to do so. Pt alert, VSS, breathing easily with sats 98% on RA. Denies nausea or pain.

## 2020-12-04 NOTE — DISCHARGE INSTRUCTIONS
[facility]  Same-Day Surgery   Adult Discharge Orders & Instructions     For 24 hours after surgery    1. Get plenty of rest.  A responsible adult must stay with you for at least 24 hours after you leave the hospital.   2. Do not drive or use heavy equipment.  If you have weakness or tingling, don't drive or use heavy equipment until this feeling goes away.  3. Do not drink alcohol.  4. Avoid strenuous or risky activities.  Ask for help when climbing stairs.   5. You may feel lightheaded.  IF so, sit for a few minutes before standing.  Have someone help you get up.   6. If you have nausea (feel sick to your stomach): Drink only clear liquids such as apple juice, ginger ale, broth or 7-Up.  Rest may also help.  Be sure to drink enough fluids.  Move to a regular diet as you feel able.  7. You may have a slight fever. Call the doctor if your fever is over 100 F (37.7 C) (taken under the tongue) or lasts longer than 24 hours.  8. You may have a dry mouth, a sore throat, muscle aches or trouble sleeping.  These should go away after 24 hours.  9. Do not make important or legal decisions.   Call your doctor for any of the followin.  Signs of infection (fever, growing tenderness at the surgery site, a large amount of drainage or bleeding, severe pain, foul-smelling drainage, redness, swelling).    2. It has been over 8 to 10 hours since surgery and you are still not able to urinate (pass water).    3.  Headache for over 24 hours.    4.  Numbness, tingling or weakness the day after surgery (if you had spinal anesthesia).  To contact a doctor, call Dr Hale's clinic at [phone] during office hours or:        After hours and weekends call [phone] and ask for the resident on call for   Urology (answered 24 hours a day)      Emergency Department:    [facility]: [phone]       (TTY for hearing impaired: [phone])    [facility]: [phone]       (TTY  for hearing impaired: [phone])

## 2020-12-04 NOTE — OP NOTE
OPERATIVE REPORT    DATE OF SURGERY: 12/4/2020    PREOPERATIVE DIAGNOSIS: Left Kidney Stone     POSTOPERATIVE DIAGNOSIS:  Same    PROCEDURES PERFORMED:   1. Cystoscopy  2. Left Ureteroscopy   3. Holmium Laser Lithotripsy  4. Stone Basket Extraction  5. Retrograde Pyelogram with interpretation of imaging  6. Left Ureteral Stent exchange    STAFF SURGEON: Adrian Hale, present and participatory for the entire case.     RESIDENT: Amol Baig MD MS    ANESTHESIA: General    ESTIMATED BLOOD LOSS: 10 cc    DRAINS/TUBES:   Left 6 Israeli x 26 cm double-J ureteral stent    IV FLUIDS: Please see dictated anesthesia record    COMPLICATIONS: None.     SPECIMEN:   Left Kidney stone    SIGNIFICANT FINDINGS:   - Left upper kidney stone with necrotic/friable appearing peripheral tissue  - Blunted upper pole calyces  - Stent well-positioned with curl noted in bladder and renal pelvis    BRIEF OPERATIVE INDICATIONS: Abhijeet Mccauley is a 59 year old male with a known left kidney stone that was stented in June 2020 after failed PNT attempt. He presents today for staged ureteroscopy for definitive stone management. After a discussion of all risks, benefits, and alternatives, the patient elected to proceed with definitive stone management with a ureteroscopic approach.      DESCRIPTION OF PROCEDURE:  After verification of informed consent was obtained, the patient was brought back to the operating room and transferred to the operating room table. After induction of adequate anesthesia, the patient was repositioned in the dorsal lithotomy position and prepped and draped in the standard sterile fashion. A timeout was performed to confirm the correct patient, procedure and laterality. The patient received yana-operative antibiotics and had bilateral sequential compression devices for DVT prophylaxis.    A 22-Israeli rigid cystoscope was inserted into a well lubricated urethra. The urethra was unremarkable and the prostate was  open. Upon entry into the bladder, the media was turbid. The stent was seen eminating from the left ureteral orifice. A flexible stent grasper was used to bring the stent to the urethral meatus and was withdrawn without significant force. The stent was moderately encrusted. A guidewire was advanced through the existing stent up to the left renal pelvis under fluoroscopic guidance. The existing double J ureteral stent was removed over the wire and was discarded.    We next used the LithoVue disposable ureteroscope and advanced this alongside our wire up the left ureter. There were no stones visualized in the left ureter. Systematic pyeloscopy was performed with a large stone noted in the upper pole of the kidney. The mid and lower poles were stone free with a small amount of sediment noted. The disposable ureteroscope was then withdrawn.    Over the guidewire, we then advanced the inner sheath of an 12/14 36 cm access sheath. This advanced easily up to the level of the proximal ureter. The inner sheath was removed and the entire access sheath was then advanced up to the level of the proximal ureter without appreciable resistance. The inner sheath was removed and we then advanced our LithoVue ureteroscope up to the left kidney stone through our access sheath.     A 200 micron laser fiber was then advanced through the ureteroscope up to the left kidney stone. A separate timeout was taken prior to initiating lithotripsy. The laser was set to 0.3J and 30Hz and lithotripsy was performed. During lithotripsy of the stone, we noted cloudy, soft drainage from the stone, as well as friable, sloughing tissue along the calyces. The laser was then reset to 1J  And 10Hz and the stone was further fragmented. We performed lithotripsy for approximately 70 minutes. We then removed the laser fiber and advanced a Halo basket to remove multiple stone fragments as well as the sloughing, friable tissue. This was sent off for culture.      Next, the guidewire was advanced through the flexible ureteroscope and the ureteroscope and access sheath removed. Under fluoroscopic guidance, a 6x26 Double J ureteral stent was advanced over the wire without appreciable resistance, and a good curl was noted in the renal pelvis and bladder. The wire was removed. The bladder was emptied.     The patient tolerated the procedure well and there were no apparent complications. The patient was transported to the PACU in stable condition.     POSTOP PLAN:  -Discharge to home  -1 week of Levofloxacin  - Tamsulosin and oxycodone for pain  - Will be schedule for second look ureteroscopy     Amol Baig MD MS  Urology Resident, PGY-2    I, Adrian Hale, was present and participatory for the entirety of the procedure

## 2020-12-04 NOTE — ANESTHESIA CARE TRANSFER NOTE
Patient: Abhijeet Mccauley    Procedure(s):  LEFT CYSTOURETEROSCOPY, WITH RETROGRADE PYELOGRAM, HOLMIUM LASER LITHOTRIPSY OF URETERAL CALCULUS, AND STENT INSERTION    Diagnosis: Kidney stone [N20.0]  Diagnosis Additional Information: No value filed.    Anesthesia Type:   General     Note:  Airway :Nasal Cannula  Patient transferred to:PACU  Comments: Patient transferred to PACU in stable condition, breathing spontaneously on NC, VSS.  Report given to RN.Handoff Report: Identifed the Patient, Identified the Reponsible Provider, Reviewed the pertinent medical history, Discussed the surgical course, Reviewed Intra-OP anesthesia mangement and issues during anesthesia, Set expectations for post-procedure period and Allowed opportunity for questions and acknowledgement of understanding      Vitals: (Last set prior to Anesthesia Care Transfer)    CRNA VITALS  12/4/2020 1115 - 12/4/2020 1201      12/4/2020             NIBP:  143/75    Pulse:  86    NIBP Mean:  100    SpO2:  99 %                Electronically Signed By: LOPEZ Marshall CRNA  December 4, 2020  12:01 PM

## 2020-12-06 LAB
BACTERIA SPEC CULT: ABNORMAL
SPECIMEN SOURCE: ABNORMAL

## 2020-12-07 ENCOUNTER — TELEPHONE (OUTPATIENT)
Dept: UROLOGY | Facility: CLINIC | Age: 60
End: 2020-12-07

## 2020-12-07 DIAGNOSIS — N20.0 KIDNEY STONE: Primary | ICD-10-CM

## 2020-12-08 ENCOUNTER — TELEPHONE (OUTPATIENT)
Dept: FAMILY MEDICINE | Facility: CLINIC | Age: 60
End: 2020-12-08

## 2020-12-08 ENCOUNTER — MEDICAL CORRESPONDENCE (OUTPATIENT)
Dept: HEALTH INFORMATION MANAGEMENT | Facility: CLINIC | Age: 60
End: 2020-12-08

## 2020-12-08 LAB
APPEARANCE STONE: NORMAL
COMPN STONE: NORMAL
NUMBER STONE: NORMAL
SIZE STONE: NORMAL MM
WT STONE: 70 MG

## 2020-12-08 NOTE — TELEPHONE ENCOUNTER
Plan of Care    Signed, faxed to [phone] and sent to Scanning. Lia Montalvo on 12/8/2020 at 2:37 PM

## 2020-12-09 RX ORDER — CEFAZOLIN SODIUM 1 G/3ML
1 INJECTION, POWDER, FOR SOLUTION INTRAMUSCULAR; INTRAVENOUS SEE ADMIN INSTRUCTIONS
Status: CANCELLED | OUTPATIENT
Start: 2020-12-09

## 2020-12-09 RX ORDER — CEFAZOLIN SODIUM 2 G/100ML
2 INJECTION, SOLUTION INTRAVENOUS
Status: CANCELLED | OUTPATIENT
Start: 2020-12-09

## 2020-12-09 RX ORDER — TRANEXAMIC ACID 650 MG/1
1950 TABLET ORAL ONCE
Status: CANCELLED | OUTPATIENT
Start: 2020-12-09 | End: 2020-12-09

## 2020-12-10 ENCOUNTER — MEDICAL CORRESPONDENCE (OUTPATIENT)
Dept: HEALTH INFORMATION MANAGEMENT | Facility: CLINIC | Age: 60
End: 2020-12-10

## 2020-12-10 NOTE — TELEPHONE ENCOUNTER
" [facility] Call Center    Phone Message    May a detailed message be left on voicemail: yes     Reason for Call: Other: Pt would like a call back as soon as possible to set up the next procedure, doesn't want it to be \"last minute like last time\", and wants to have the pre-op tests and stuff arranged ahead of time so he can set up a ride and work out the details.     Action Taken: Message routed to:  [facility] urology    Travel Screening: Not Applicable                                                                      "

## 2020-12-11 ENCOUNTER — TELEPHONE (OUTPATIENT)
Dept: FAMILY MEDICINE | Facility: CLINIC | Age: 60
End: 2020-12-11

## 2020-12-11 ENCOUNTER — TELEPHONE (OUTPATIENT)
Dept: UROLOGY | Facility: CLINIC | Age: 60
End: 2020-12-11

## 2020-12-11 NOTE — TELEPHONE ENCOUNTER
Patient is scheduled for surgery with Dr. Hale      Spoke or left message with: spoke to Abhijeet and his ILS worker    Date of Surgery: 1/13/2021    Location: [place]    Informed patient they will need an adult  yes    Pre-op with surgeon (if applicable): n/a    H&P: Patient to schedule with pcp    Additional imaging/appointments: n/a    Surgery packet: Mailed via [facility] on 12/11/2020     Additional comments: COVID test scheduled at [facility] on 1/9/2020 @ 3p

## 2020-12-13 DIAGNOSIS — Z11.59 ENCOUNTER FOR SCREENING FOR OTHER VIRAL DISEASES: Primary | ICD-10-CM

## 2020-12-14 NOTE — ANESTHESIA POSTPROCEDURE EVALUATION
Anesthesia POST Procedure Evaluation    Patient: Abhijeet Mccauley   MRN:     [id number] Gender:   male   Age:    60 year old :      1960        Preoperative Diagnosis: Kidney stone [N20.0]   Procedure(s):  LEFT CYSTOURETEROSCOPY, WITH RETROGRADE PYELOGRAM, HOLMIUM LASER LITHOTRIPSY OF URETERAL CALCULUS, AND STENT INSERTION   Postop Comments: No value filed.     Anesthesia Type: General       Disposition: Admission   Postop Pain Control: Uneventful            Sign Out: Well controlled pain   PONV: No   Neuro/Psych: Uneventful            Sign Out: Acceptable/Baseline neuro status   Airway/Respiratory: Uneventful            Sign Out: Acceptable/Baseline resp. status   CV/Hemodynamics: Uneventful            Sign Out: Acceptable CV status   Other NRE: NONE   DID A NON-ROUTINE EVENT OCCUR? No         Last Anesthesia Record Vitals:  CRNA VITALS  2020 1115 - 2020 1215      2020             NIBP:  144/70    Pulse:  88    NIBP Mean:  100    SpO2:  100 %          Last PACU Vitals:  Vitals Value Taken Time   /67 20 1245   Temp 36.3  C (97.4  F) 20 1245   Pulse 87 20 1245   Resp 12 20 1245   SpO2 96 % 20 1302   Temp src     NIBP 144/70 20 1204   Pulse 88 20 1204   SpO2 100 % 20 1204   Resp     Temp 36.6  C (97.8  F) 20 1202   Ht Rate     Temp 2           Electronically Signed By: Christopher J. Behrens, MD, 2020, 8:13 AM

## 2020-12-21 ENCOUNTER — PATIENT OUTREACH (OUTPATIENT)
Dept: CARE COORDINATION | Facility: CLINIC | Age: 60
End: 2020-12-21

## 2020-12-21 NOTE — PROGRESS NOTES
Social Work Intervention  [facility]    Data/Intervention:    Patient Name:  Abhijeet Mccauley  /Age:  1960 (60 year old)    Visit Type: telephone  Referral Source: Urology Clinic   Reason for Referral:  Patient needing assistance with transportation.     Collaborated With:    - Patient     Patient Concerns/Issues:   Patient has procedure scheduled and is needing assistance with setting up transportation.     Intervention/Education/Resources Provided:  Spoke with patient to provide [employer] Transportation contact information. Patient stated that he is unsure of what time and date his procedure is and inquired about date and time of pre-op. Will follow up with Care Coordinator to obtain information.    Assessment/Plan:  Sent message to Care Coordination to verify if the patient will need a  after procedure, and arrival time. Also inquired about patient's pre-op appointment per patient request. Will follow up with patient's PCA per patient's request and provide [facility] contact information along with     AUTUMN Galvan, SERGE  [facility]   Phone: [phone]

## 2020-12-22 ENCOUNTER — TELEPHONE (OUTPATIENT)
Dept: FAMILY MEDICINE | Facility: CLINIC | Age: 60
End: 2020-12-22

## 2020-12-22 NOTE — TELEPHONE ENCOUNTER
.Reason for Call:   medication question    Detailed comments: Please call to discuss a medication that patient had refilled and [facility] nurse never saw this medication being taken by Patient; please clarify; the medication is: Finasteride    Phone Number Patient can be reached at: Other phone number:  [phone]    Best Time: any    Can we leave a detailed message on this number? YES    Call taken on 12/22/2020 at 2:04 PM by Willa Bhatia

## 2020-12-22 NOTE — TELEPHONE ENCOUNTER
Yes, looks like he is supposed to be on both pills at this time.  Finasteride was for the BPH and Flomax was prescribed for one month for stones.      Thanks,  Chidi Valenzuela MD

## 2020-12-23 NOTE — TELEPHONE ENCOUNTER
[facility] Medical seat lift order (2 forms received).  Forms started and routed to Dr. Valenzuela for review.  It appears patient needs appointment in order to complete forms and I don't believe this was addressed at his last appointment.

## 2020-12-24 NOTE — TELEPHONE ENCOUNTER
Per Dr. Rivero, patient should schedule appointment in order to discuss orders for lift chair from [facility].  Forms are in Awaiting Response tray on forms desk.  Patient is not active in My Chart - please call patient to schedule - Dr. Valenzuela indicated virtual visit was ok.

## 2020-12-28 ENCOUNTER — TELEPHONE (OUTPATIENT)
Dept: FAMILY MEDICINE | Facility: CLINIC | Age: 60
End: 2020-12-28

## 2020-12-28 NOTE — TELEPHONE ENCOUNTER
Patient contacted and told of the need for a virtual visit to complete form. Catherine WANG RN

## 2020-12-29 DIAGNOSIS — J30.1 SEASONAL ALLERGIC RHINITIS DUE TO POLLEN: ICD-10-CM

## 2020-12-29 NOTE — TELEPHONE ENCOUNTER
"Requested Prescriptions   Pending Prescriptions Disp Refills     montelukast (SINGULAIR) 10 MG tablet [Pharmacy Med Name: MONTELUKAST SODIUM 10MG TABS] 90 tablet 3     Sig: TAKE ONE TABLET BY MOUTH AT BEDTIME       Leukotriene Inhibitors Protocol Failed - 12/29/2020 12:37 PM        Failed - Asthma control assessment score within normal limits in last 6 months     Please review ACT score.           Passed - Patient is age 12 or older     If patient is under 16, ok to refill using age based dosing.           Passed - Medication is active on med list        Passed - Recent (6 mo) or future (30 days) visit within the authorizing provider's specialty     Patient had office visit in the last 6 months or has a visit in the next 30 days with authorizing provider or within the authorizing provider's specialty.  See \"Patient Info\" tab in inbasket, or \"Choose Columns\" in Meds & Orders section of the refill encounter.                 "

## 2021-01-01 NOTE — H&P
[facility]    History and Physical  Hospital Medicine       Date of Admission:  5/17/2018  Date of Service: 5/17/2018     Assessment & Plan   Abhijeet Mccauley is a 57 year old male who presents on 5/17/2018 with generalized weakness and a Hgb of 6.8 noted on clinic labs.    Principal Problem:    Normocytic anemia - Progressive problem characterized by slow decline in Hgb over weeks.  Was hospitalized 3/16-3/18, transfused, so Hgb was 8.5 on 3/28/18.  Since then, Hgb has gone to 7.8, 7.2, then 6.8 today, prompting admission.  GI loss has been postulated, but no active bleeding was seen on 2/8/18 EGD, and no source of bleeding was found on EGD and colonoscopy 3/16/18.  Today, stool guaiac is negative.  Fe deficiency was postulated, and the patient was started on PO iron in 3/2018, which he took for only one dose then stopped it due to GI upset, but Fe is within normal range at 43 even off of FeSO4.    I wonder if the patient's hematologic malignancy, AML, has recurred or if he has other marrow disease.  Along with anemia, he has a persistent leukocytosis, though predominantly monocytes, admission WBC 15.4, which seems to be trending up over past two months.  Platelets are normal.  - Receiving unit 1 of 1 PRBC.  - Repeat Hgb in AM.  - Can probably be discharged in AM if Hgb adequate and outpatient follow-up, possibly to include Hematology re-evaluation, is in place, as I do not think repeating GI workup will be fruitful.    Active Problems:    Essential hypertension - good control on current Rx.  - Continue propranolol, furosemide.      Acute myeloid leukemia in remission (H) - Portion of most recent Hematology evaluation in [facility], 12/10/14, Dr. Caro:    He was diagnosed with AML in June of 2008 who underwent induction chemotherapy with 7 + 3 and a second cycle of induction of 5 + 2 with remission and high dose KELLIE-C x 4, completed in December 2008. His complications included a line  [Mother] : mother associated DVT developing into a pulmonary embolus in December 2008 for which he was anticoagulated for six months. He was last seen in clinic on January 26, 2012 as he was considered essentially cured of disease.    - Given unexplained, recurrent anemia, as well as persistent and perhaps progressive leukocytosis, I think Hematology re-evaluation is warranted.      CKD (chronic kidney disease) stage 3, GFR 30-59 ml/min - GFR not back yet.  - Follow GFR.      Mild intermittent asthma without complication - Asymptomatic most days.  Tonight's lung exam is normal.  - Resume home inhaler regimen.     DVT Prophylaxis: Low Risk/Ambulatory with no VTE prophylaxis indicated  Code Status: Full Code    Lines: Peripheral IV  Chong catheter: Not needed.  Restraints: None.    Disposition: Anticipate discharge tomorrow if Hgb is adequate and outpatient follow-up plan is in place.   Appropriate for Observation care.    Kristopher Byrd MD       Primary Care Physician   Parrish Funk [phone]    History is obtained from the patient and review of old records via the EMR.    Past Medical History      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)     s/p chemo, no bone marrow transplant     Chronic obstructive pulmonary disease 5/17/2018     COPD (chronic obstructive pulmonary disease) (H)      History of pulmonary embolus (PE)      Hypertension      PUD (peptic ulcer disease)      Tobacco dependence      Ulcerative colitis (H)        Past Surgical History     Past Surgical History:   Procedure Laterality Date     AS TOTAL KNEE ARTHROPLASTY Left      ESOPHAGOSCOPY, GASTROSCOPY, DUODENOSCOPY (EGD), COMBINED N/A 2/8/2018    Procedure: COMBINED ESOPHAGOSCOPY, GASTROSCOPY, DUODENOSCOPY (EGD), BIOPSY SINGLE OR MULTIPLE;  gastroscopy with biopsies;  Surgeon: Raz Cobb MD;   "Location: [facility]     ESOPHAGOSCOPY, GASTROSCOPY, DUODENOSCOPY (EGD), COMBINED N/A 3/18/2018    Procedure: COMBINED ESOPHAGOSCOPY, GASTROSCOPY, DUODENOSCOPY (EGD);;  Surgeon: Raz Cobb MD;  Location: [facility]     LACERATION REPAIR Right     Right leg        History of Present Illness   Abhijeet Mccauley is a 57 year old male with the above past medical history now presents on 5/17/2018 with generalized weakness and a Hgb of 6.8 from clinic labs.    Mr. Mccauley was hospitalized 3/16 through 3/18/18 here at [facility] for anemia and presumed GI hemorrhage.  Dr. Tom, surgery, was consulted.  Dr. Tom performed upper endoscopy on 3/16/2018 which did not demonstrate active bleeding.  Colonoscopy performed 3/16/2018 did not show active bleeding.  The patient was transfused, then discharged home.    Mr. Culp and has had a few clinic visits since that March 2018 hospital admission which involved labs.  His hemoglobin has slowly declined since that hospitalization and transfusion, from 7.8, 2 7.2, and today to 6.8, on labs done at a clinic visit.  When that hemoglobin was noted, the patient was advised to come to the [facility] emergency department for evaluation and probable admission.    The patient's only complaint is that of generalized weakness.  On further questioning, he does not necessarily feel weak, he \"just does not feel right\".  He has had no emesis or hematemesis.  He said that his bowel movements have been tan, without melena or hematochezia.  He has had no abdominal pain.  He denies any unusual bruising, or excess bleeding after cuts, such as a cut he sustained on his left thumb recently.    Looking back through old records, I found that the patient also underwent upper endoscopy on 2/8/2018.  No active bleeding or a source of bleeding was seen on that exam either.        Prior to Admission Medications   Prior to Admission Medications   Prescriptions Last Dose Informant Patient Reported? " Taking?   Melatonin ER 5 MG TBCR 5/16/2018 at hs  No Yes   Sig: Take 1 tablet by mouth At Bedtime   PANTOPRAZOLE SODIUM PO 5/17/2018 at am  Yes Yes   Sig: Take 40 mg by mouth every morning (before breakfast)   SODIUM BICARBONATE PO  Self Yes No   Sig: Take 650 mg by mouth daily as needed    acetaminophen (TYLENOL) 500 MG tablet  Self Yes No   Sig: Take 1-2 tablets by mouth every 6 hours as needed.   albuterol (PROAIR HFA/PROVENTIL HFA/VENTOLIN HFA) 108 (90 BASE) MCG/ACT Inhaler 5/16/2018 at hs  No Yes   Sig: Inhale 2 puffs into the lungs every 4 hours as needed   desonide (DESOWEN) 0.05 % lotion More than a month at Unknown time  No No   Sig: Apply topically as needed   furosemide (LASIX) 20 MG tablet 5/17/2018 at am  No Yes   Sig: Take 1 tablet (20 mg) by mouth every morning   ibuprofen (ADVIL/MOTRIN) 400 MG tablet 5/16/2018 at pm  No Yes   Sig: Take 1 tablet (400 mg) by mouth every 6 hours as needed for moderate pain   lactulose (CHRONULAC) 10 GM/15ML solution Past Month  No Yes   Sig: Take 10.1 mLs (6.7 g) by mouth 2 times daily   loratadine (CLARITIN) 10 MG tablet 5/17/2018 at am Self No Yes   Sig: Take 1 tablet by mouth daily as needed for allergies.   mometasone-formoterol (DULERA) 200-5 MCG/ACT oral inhaler 5/16/2018 at pm  Yes Yes   Sig: Inhale 2 puffs into the lungs 2 times daily    potassium chloride SA (K-DUR/KLOR-CON M) 10 MEQ CR tablet 5/17/2018 at am  No Yes   Sig: Take 1 tablet (10 mEq) by mouth daily   propranolol (INDERAL) 20 MG tablet 5/17/2018 at am  No Yes   Sig: Take 1 tablet (20 mg) by mouth 2 times daily   tiotropium (SPIRIVA) 18 MCG capsule Past Week at Unknown time  Yes Yes   Sig: Inhale 1 capsule into the lungs daily    traZODone (DESYREL) 50 MG tablet 5/16/2018 at hs  No Yes   Sig: Take 1 tablet (50 mg) by mouth At Bedtime      Facility-Administered Medications: None     Allergies   Allergies   Allergen Reactions     Blood Transfusion Related (Informational Only)      Patient has a history  "of a clinically significant antibody against RBC antigens.  A delay in compatible RBCs may occur.     Famotidine Unknown and Other (See Comments)     Severe abdominal cramps     Allergy      Ibuprofen     Cyclobenzaprine      hives     Gabapentin      Made pt's \"face break out\"     Methocarbamol      Made pt's \"face break out\"     Motrin [Nsaids] Unknown and Hives     As reviewed in H/P of 3/2/12 Dr. Clint Plata Cramps     Severe abdominal cramps     Vancomycin      Other reaction(s): Other  hallucinations     Vfend      IV     Hydrocodone-Acetaminophen Rash       Family History    Family History   Problem Relation Age of Onset     Hypertension Mother      Coronary Artery Disease Father      MI       Social History   Social History     Social History     Marital status: Single     Spouse name: N/A     Number of children: N/A     Years of education: N/A     Occupational History     Not on file.     Social History Main Topics     Smoking status: Current Every Day Smoker     Packs/day: 0.50     Years: 30.00     Types: Cigarettes     Smokeless tobacco: Never Used      Comment: declined quit plan, getting lower than 1/2 ppd     Alcohol use Yes      Comment: Down to 3 beers per day.  Sometimes a cocktail also.     Drug use: Yes     Special: Marijuana     Sexual activity: Not on file     Other Topics Concern     Not on file     Social History Narrative       Review of Systems   ROS:  CONSTITUTIONAL: NEGATIVE for chills, fever, sweats or weight loss.  EYES: NEGATIVE for visual changes, eye irritation.  ENT/MOUTH: Notable for nasal congestion and drainage, as well as frontal sinus pressure, for which he was given Augmentin recently.  Symptoms are better but not resolved. NEGATIVE for earache, hoarseness.  RESP: NEGATIVE for dyspnea, cough, wheeze, or respiratory chest pain.   CV: NEGATIVE for chest pain, palpitations, orthopnea, or lower extremity edema.  GI: NEGATIVE for difficulties swallowing, abdominal pain, " diarrhea, nausea or vomiting.  : NEGATIVE for hematuria or flank pain.  MUSCULOSKELETAL: NEGATIVE for arthralgias, back pain, joint pain, or joint swelling  NEURO: NEGATIVE for focal numbness or weakness, syncope, stroke or seizure disorder, or gait disturbance.  PSYCHIATRIC: NEGATIVE for anxiety and depression, panic, or recent change in mood.      Physical Exam   /62  Pulse 78  Temp 98.3  F (36.8  C) (Oral)  Resp 16  Wt 84.4 kg (186 lb)  SpO2 100%  BMI 30.02 kg/m2     Weight: 186 lbs 0 oz Body mass index is 30.02 kg/(m^2).     GENERAL: Pleasant man, looks comfortable.  EYES: Eyes grossly normal to inspection, extraocular movements intact  HENT: Nares patent bilaterally to sniff.  NECK: Trachea midline, no stridor.    RESP: No accessory muscle use.  Symmetrical breath sounds.  Lungs clear throughout on inspiration and expiration.  Expiration not prolonged, no wheeze.  CV: Regular rate and rhythm, non-tachycardic.  Normal S1 S2, grade I/VI holosystolic murmur heard at the upper sternal borders, no extra sound.  No lower extremity edema.  ABDOMEN: Protuberant, soft, non-tender, no guarding.  Liver and spleen not palpable, no masses palpable.  Bowel sounds positive.  MS: No bony deformities noted.  No red or inflamed joints.  SKIN: Warm and dry, no rashes.  NEURO: Alert, oriented, conversant.  Cranial nerves II - XII grossly intact.  No gross motor or sensory deficits.   PSYCH: Calm, alert, conversant.  Able to articulate logical thoughts, no tangential thoughts, no hallucinations or delusions.  Affect normal.        Data   Data reviewed today:     Recent Labs  Lab 05/17/18  1215   WBC 15.4*   HGB 6.8*  7.2*   MCV 84      ALBUMIN 3.3*   PROTTOTAL 7.2   BILITOTAL 2.0*   ALKPHOS 261*   ALT 28   AST 34       No results found for this or any previous visit (from the past 24 hour(s)).    I personally reviewed no images or EKG's today.    I spent 40 minutes on this admission, 20 of which were spent in  care coordination and counseling.    Kristopher Byrd MD

## 2021-01-04 ENCOUNTER — TELEPHONE (OUTPATIENT)
Dept: UROLOGY | Facility: CLINIC | Age: 61
End: 2021-01-04

## 2021-01-04 RX ORDER — MONTELUKAST SODIUM 10 MG/1
TABLET ORAL
Qty: 90 TABLET | Refills: 0 | Status: SHIPPED | OUTPATIENT
Start: 2021-01-04 | End: 2021-04-19

## 2021-01-04 NOTE — TELEPHONE ENCOUNTER
Routing refill request to provider for review/approval because:  ACT outdated, has appt tomorrow, added to notes to update  Takes for dx seasonal allergic rhinitis due to pollen and others  ACT Total Scores 12/5/2018 2/8/2019 12/5/2019   ACT TOTAL SCORE (Goal Greater than or Equal to 20) 11 16 9   In the past 12 months, how many times did you visit the emergency room for your asthma without being admitted to the hospital? 0 0 0   In the past 12 months, how many times were you hospitalized overnight because of your asthma? 0 0 0     Angelina HDZ, RN, BSN

## 2021-01-04 NOTE — TELEPHONE ENCOUNTER
Called patient and told him to leave urine specimen at his pre op tomorrow  He must givee us urine culture Samantha Kaufman, SHELIAN Staff Nurse

## 2021-01-05 ENCOUNTER — OFFICE VISIT (OUTPATIENT)
Dept: FAMILY MEDICINE | Facility: CLINIC | Age: 61
End: 2021-01-05
Payer: COMMERCIAL

## 2021-01-05 VITALS
BODY MASS INDEX: 30.16 KG/M2 | WEIGHT: 181 LBS | DIASTOLIC BLOOD PRESSURE: 70 MMHG | TEMPERATURE: 98 F | SYSTOLIC BLOOD PRESSURE: 139 MMHG | RESPIRATION RATE: 16 BRPM | HEIGHT: 65 IN | OXYGEN SATURATION: 97 % | HEART RATE: 103 BPM

## 2021-01-05 DIAGNOSIS — N18.32 STAGE 3B CHRONIC KIDNEY DISEASE (H): ICD-10-CM

## 2021-01-05 DIAGNOSIS — Z01.818 PRE-OP EXAM: Primary | ICD-10-CM

## 2021-01-05 DIAGNOSIS — N20.0 KIDNEY STONE: ICD-10-CM

## 2021-01-05 DIAGNOSIS — K70.30 ALCOHOLIC CIRRHOSIS, UNSPECIFIED WHETHER ASCITES PRESENT (H): ICD-10-CM

## 2021-01-05 DIAGNOSIS — R07.89 OTHER CHEST PAIN: ICD-10-CM

## 2021-01-05 DIAGNOSIS — F17.200 TOBACCO DEPENDENCE SYNDROME: ICD-10-CM

## 2021-01-05 DIAGNOSIS — D50.0 IRON DEFICIENCY ANEMIA DUE TO CHRONIC BLOOD LOSS: ICD-10-CM

## 2021-01-05 LAB
ALBUMIN UR-MCNC: 30 MG/DL
ANION GAP SERPL CALCULATED.3IONS-SCNC: 10 MMOL/L (ref 3–14)
APPEARANCE UR: CLEAR
BACTERIA #/AREA URNS HPF: ABNORMAL /HPF
BASOPHILS # BLD AUTO: 0.1 10E9/L (ref 0–0.2)
BASOPHILS NFR BLD AUTO: 0.7 %
BILIRUB UR QL STRIP: NEGATIVE
BUN SERPL-MCNC: 21 MG/DL (ref 7–30)
CALCIUM SERPL-MCNC: 9.1 MG/DL (ref 8.5–10.1)
CHLORIDE SERPL-SCNC: 105 MMOL/L (ref 94–109)
CO2 SERPL-SCNC: 21 MMOL/L (ref 20–32)
COLOR UR AUTO: YELLOW
CREAT SERPL-MCNC: 2.12 MG/DL (ref 0.66–1.25)
DIFFERENTIAL METHOD BLD: ABNORMAL
EOSINOPHIL # BLD AUTO: 0.2 10E9/L (ref 0–0.7)
EOSINOPHIL NFR BLD AUTO: 1.5 %
ERYTHROCYTE [DISTWIDTH] IN BLOOD BY AUTOMATED COUNT: 20.9 % (ref 10–15)
GFR SERPL CREATININE-BSD FRML MDRD: 32 ML/MIN/{1.73_M2}
GLUCOSE SERPL-MCNC: 119 MG/DL (ref 70–99)
GLUCOSE UR STRIP-MCNC: NEGATIVE MG/DL
HCT VFR BLD AUTO: 30 % (ref 40–53)
HGB BLD-MCNC: 9.1 G/DL (ref 13.3–17.7)
HGB UR QL STRIP: ABNORMAL
IMM GRANULOCYTES # BLD: 0.1 10E9/L (ref 0–0.4)
IMM GRANULOCYTES NFR BLD: 0.5 %
KETONES UR STRIP-MCNC: NEGATIVE MG/DL
LEUKOCYTE ESTERASE UR QL STRIP: ABNORMAL
LYMPHOCYTES # BLD AUTO: 1.1 10E9/L (ref 0.8–5.3)
LYMPHOCYTES NFR BLD AUTO: 11.4 %
MCH RBC QN AUTO: 26.8 PG (ref 26.5–33)
MCHC RBC AUTO-ENTMCNC: 30.3 G/DL (ref 31.5–36.5)
MCV RBC AUTO: 88 FL (ref 78–100)
MONOCYTES # BLD AUTO: 1.3 10E9/L (ref 0–1.3)
MONOCYTES NFR BLD AUTO: 13.4 %
NEUTROPHILS # BLD AUTO: 7.2 10E9/L (ref 1.6–8.3)
NEUTROPHILS NFR BLD AUTO: 72.5 %
NITRATE UR QL: NEGATIVE
NON-SQ EPI CELLS #/AREA URNS LPF: ABNORMAL /LPF
NRBC # BLD AUTO: 0 10*3/UL
NRBC BLD AUTO-RTO: 0 /100
PH UR STRIP: 6 PH (ref 5–7)
PLATELET # BLD AUTO: 153 10E9/L (ref 150–450)
POTASSIUM SERPL-SCNC: 4 MMOL/L (ref 3.4–5.3)
RBC # BLD AUTO: 3.4 10E12/L (ref 4.4–5.9)
RBC #/AREA URNS AUTO: ABNORMAL /HPF
SODIUM SERPL-SCNC: 136 MMOL/L (ref 133–144)
SOURCE: ABNORMAL
SP GR UR STRIP: 1.01 (ref 1–1.03)
UROBILINOGEN UR STRIP-ACNC: 0.2 EU/DL (ref 0.2–1)
WBC # BLD AUTO: 9.9 10E9/L (ref 4–11)
WBC #/AREA URNS AUTO: ABNORMAL /HPF
WBC CLUMPS #/AREA URNS HPF: PRESENT /HPF

## 2021-01-05 PROCEDURE — 36415 COLL VENOUS BLD VENIPUNCTURE: CPT | Performed by: FAMILY MEDICINE

## 2021-01-05 PROCEDURE — 99214 OFFICE O/P EST MOD 30 MIN: CPT | Performed by: FAMILY MEDICINE

## 2021-01-05 PROCEDURE — 93000 ELECTROCARDIOGRAM COMPLETE: CPT | Performed by: FAMILY MEDICINE

## 2021-01-05 PROCEDURE — 80048 BASIC METABOLIC PNL TOTAL CA: CPT | Performed by: FAMILY MEDICINE

## 2021-01-05 PROCEDURE — 81001 URINALYSIS AUTO W/SCOPE: CPT | Performed by: FAMILY MEDICINE

## 2021-01-05 PROCEDURE — 85025 COMPLETE CBC W/AUTO DIFF WBC: CPT | Performed by: FAMILY MEDICINE

## 2021-01-05 PROCEDURE — 87086 URINE CULTURE/COLONY COUNT: CPT | Performed by: FAMILY MEDICINE

## 2021-01-05 ASSESSMENT — ANXIETY QUESTIONNAIRES
GAD7 TOTAL SCORE: 18
IF YOU CHECKED OFF ANY PROBLEMS ON THIS QUESTIONNAIRE, HOW DIFFICULT HAVE THESE PROBLEMS MADE IT FOR YOU TO DO YOUR WORK, TAKE CARE OF THINGS AT HOME, OR GET ALONG WITH OTHER PEOPLE: SOMEWHAT DIFFICULT
6. BECOMING EASILY ANNOYED OR IRRITABLE: NEARLY EVERY DAY
5. BEING SO RESTLESS THAT IT IS HARD TO SIT STILL: NEARLY EVERY DAY
3. WORRYING TOO MUCH ABOUT DIFFERENT THINGS: NEARLY EVERY DAY
7. FEELING AFRAID AS IF SOMETHING AWFUL MIGHT HAPPEN: NOT AT ALL
2. NOT BEING ABLE TO STOP OR CONTROL WORRYING: NEARLY EVERY DAY
1. FEELING NERVOUS, ANXIOUS, OR ON EDGE: NEARLY EVERY DAY

## 2021-01-05 ASSESSMENT — PATIENT HEALTH QUESTIONNAIRE - PHQ9
SUM OF ALL RESPONSES TO PHQ QUESTIONS 1-9: 5
5. POOR APPETITE OR OVEREATING: NEARLY EVERY DAY

## 2021-01-05 ASSESSMENT — PAIN SCALES - GENERAL: PAINLEVEL: EXTREME PAIN (9)

## 2021-01-05 ASSESSMENT — MIFFLIN-ST. JEOR: SCORE: 1547.89

## 2021-01-05 NOTE — TELEPHONE ENCOUNTER
Appt tomorrow is scheduled with another provider in primary care, so I sent his refill over.    Thanks,  Chidi Valenzuela MD
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
## 2021-01-06 LAB
BACTERIA SPEC CULT: NO GROWTH
Lab: NORMAL
SPECIMEN SOURCE: NORMAL

## 2021-01-06 ASSESSMENT — ANXIETY QUESTIONNAIRES: GAD7 TOTAL SCORE: 18

## 2021-01-06 ASSESSMENT — ASTHMA QUESTIONNAIRES: ACT_TOTALSCORE: 12

## 2021-01-07 DIAGNOSIS — N20.0 KIDNEY STONE: ICD-10-CM

## 2021-01-07 RX ORDER — TAMSULOSIN HYDROCHLORIDE 0.4 MG/1
0.4 CAPSULE ORAL DAILY
Qty: 30 CAPSULE | Refills: 0 | Status: CANCELLED | OUTPATIENT
Start: 2021-01-07

## 2021-01-07 NOTE — TELEPHONE ENCOUNTER
"Requested Prescriptions   Pending Prescriptions Disp Refills     tamsulosin (FLOMAX) 0.4 MG capsule 30 capsule 0     Sig: Take 1 capsule (0.4 mg) by mouth daily       Alpha Blockers Passed - 1/7/2021  3:30 PM        Passed - Blood pressure under 140/90 in past 12 months     BP Readings from Last 3 Encounters:   01/05/21 139/70   12/04/20 (!) 150/81   12/03/20 126/57                 Passed - Recent (12 mo) or future (30 days) visit within the authorizing provider's specialty     Patient has had an office visit with the authorizing provider or a provider within the authorizing providers department within the previous 12 mos or has a future within next 30 days. See \"Patient Info\" tab in inbasket, or \"Choose Columns\" in Meds & Orders section of the refill encounter.              Passed - Patient does not have Tadalafil, Vardenafil, or Sildenafil on their medication list        Passed - Medication is active on med list        Passed - Patient is 18 years of age or older             "

## 2021-01-08 ENCOUNTER — TELEPHONE (OUTPATIENT)
Dept: FAMILY MEDICINE | Facility: CLINIC | Age: 61
End: 2021-01-08
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
## 2021-01-08 NOTE — TELEPHONE ENCOUNTER
----- Message from Delilah Garcia sent at 5/24/2018  1:23 PM CDT -----  Regarding: RE: Npt 1 pm today - Patient probably will not be coming today was code 21 in Family Practice this am  Pt called and this appointment is rescheduled for next Thursday at 2 pm.    Carmina  ----- Message -----     From: Karen Alford     Sent: 5/24/2018  12:02 PM       To: Charleen Rowe CMA, #  Subject: Npt 1 pm today - Patient probably will not b#    Patient was sent home by Security so probably will not be here for 1 pm new patient appointment today with Dr Rowe.    Medical Assistant from Family Practice to let us know that Dr Good CMA had to call code 21 and Security escorted patient out of building to a waiting bus.    I will wait to cancel appointment till after 1 pm check in.     If he does not come as expected, then plan to call tomorrow to reschedule missed appointment     Karen    
no

## 2021-01-09 DIAGNOSIS — Z11.59 ENCOUNTER FOR SCREENING FOR OTHER VIRAL DISEASES: ICD-10-CM

## 2021-01-09 LAB
SARS-COV-2 RNA RESP QL NAA+PROBE: NORMAL
SPECIMEN SOURCE: NORMAL

## 2021-01-09 PROCEDURE — U0005 INFEC AGEN DETEC AMPLI PROBE: HCPCS | Performed by: UROLOGY

## 2021-01-09 PROCEDURE — U0003 INFECTIOUS AGENT DETECTION BY NUCLEIC ACID (DNA OR RNA); SEVERE ACUTE RESPIRATORY SYNDROME CORONAVIRUS 2 (SARS-COV-2) (CORONAVIRUS DISEASE [COVID-19]), AMPLIFIED PROBE TECHNIQUE, MAKING USE OF HIGH THROUGHPUT TECHNOLOGIES AS DESCRIBED BY CMS-2020-01-R: HCPCS | Performed by: UROLOGY

## 2021-01-10 LAB
LABORATORY COMMENT REPORT: NORMAL
SARS-COV-2 RNA RESP QL NAA+PROBE: NEGATIVE
SPECIMEN SOURCE: NORMAL

## 2021-01-11 ENCOUNTER — HOSPITAL ENCOUNTER (OUTPATIENT)
Dept: CARDIOLOGY | Facility: CLINIC | Age: 61
End: 2021-01-11
Attending: FAMILY MEDICINE
Payer: COMMERCIAL

## 2021-01-11 ENCOUNTER — HOSPITAL ENCOUNTER (OUTPATIENT)
Dept: NUCLEAR MEDICINE | Facility: CLINIC | Age: 61
Setting detail: NUCLEAR MEDICINE
End: 2021-01-11
Attending: FAMILY MEDICINE
Payer: COMMERCIAL

## 2021-01-11 ENCOUNTER — PATIENT OUTREACH (OUTPATIENT)
Dept: CARE COORDINATION | Facility: CLINIC | Age: 61
End: 2021-01-11

## 2021-01-11 VITALS — HEIGHT: 65 IN | WEIGHT: 181 LBS | BODY MASS INDEX: 30.16 KG/M2

## 2021-01-11 DIAGNOSIS — R07.89 OTHER CHEST PAIN: ICD-10-CM

## 2021-01-11 LAB
CV STRESS MAX HR HE: 125
NUC STRESS EJECTION FRACTION: 75 %
RATE PRESSURE PRODUCT: NORMAL
STRESS ECHO BASELINE DIASTOLIC HE: 72
STRESS ECHO BASELINE HR: 94
STRESS ECHO BASELINE SYSTOLIC BP: 150
STRESS ECHO CALCULATED PERCENT HR: 79 %
STRESS ECHO LAST STRESS DIASTOLIC BP: 66
STRESS ECHO LAST STRESS SYSTOLIC BP: 136
STRESS ECHO TARGET HR: 158

## 2021-01-11 PROCEDURE — 250N000011 HC RX IP 250 OP 636: Performed by: FAMILY MEDICINE

## 2021-01-11 PROCEDURE — A9502 TC99M TETROFOSMIN: HCPCS | Performed by: FAMILY MEDICINE

## 2021-01-11 PROCEDURE — 343N000001 HC RX 343: Performed by: FAMILY MEDICINE

## 2021-01-11 PROCEDURE — 93016 CV STRESS TEST SUPVJ ONLY: CPT | Performed by: INTERNAL MEDICINE

## 2021-01-11 PROCEDURE — 78452 HT MUSCLE IMAGE SPECT MULT: CPT | Mod: 26 | Performed by: INTERNAL MEDICINE

## 2021-01-11 PROCEDURE — 93017 CV STRESS TEST TRACING ONLY: CPT

## 2021-01-11 PROCEDURE — 78452 HT MUSCLE IMAGE SPECT MULT: CPT

## 2021-01-11 PROCEDURE — 93018 CV STRESS TEST I&R ONLY: CPT | Performed by: INTERNAL MEDICINE

## 2021-01-11 RX ORDER — REGADENOSON 0.08 MG/ML
0.4 INJECTION, SOLUTION INTRAVENOUS ONCE
Status: COMPLETED | OUTPATIENT
Start: 2021-01-11 | End: 2021-01-11

## 2021-01-11 RX ADMIN — REGADENOSON 0.4 MG: 0.08 INJECTION, SOLUTION INTRAVENOUS at 14:19

## 2021-01-11 RX ADMIN — TETROFOSMIN 31.3 MCI.: 1.38 INJECTION, POWDER, LYOPHILIZED, FOR SOLUTION INTRAVENOUS at 14:40

## 2021-01-11 RX ADMIN — TETROFOSMIN 10.2 MCI.: 1.38 INJECTION, POWDER, LYOPHILIZED, FOR SOLUTION INTRAVENOUS at 12:45

## 2021-01-11 ASSESSMENT — MIFFLIN-ST. JEOR: SCORE: 1547.89

## 2021-01-11 NOTE — PROGRESS NOTES
Social Work Follow-Up  [facility] [place]    Data/Intervention:  Patient Name:  Abhijeet Mccauley  /Age:  1960 (60 year old)    Reason for Follow-Up: Transportation for upcoming surgery.     Collaborated With:    - Abhijeet Mccauley, Patient  - MARJORIE Soler [phone]    Intervention/Education/Resources Provided:  Contacted patient and PCA Karlene in order to confirm that patient's transportation has been arranged for scheduled surgery through [facility]. Patient provided verbal authorization to speak to his PCA Karlene. Confirmed transportation is arranged through [employer].     Assessment/Plan:  Confirmed that patient's transportation has been arranged through [employer], and patient will arrive by 10 AM. Previously provided patient/family with writer's contact information and availability. No further follow up planned.     AUTUMN Galvan, [facility] [place]   Phone: [phone]

## 2021-01-12 ENCOUNTER — OFFICE VISIT (OUTPATIENT)
Dept: FAMILY MEDICINE | Facility: CLINIC | Age: 61
End: 2021-01-12
Payer: COMMERCIAL

## 2021-01-12 VITALS
OXYGEN SATURATION: 98 % | TEMPERATURE: 97.3 F | RESPIRATION RATE: 16 BRPM | BODY MASS INDEX: 29.49 KG/M2 | WEIGHT: 177 LBS | HEART RATE: 97 BPM | SYSTOLIC BLOOD PRESSURE: 122 MMHG | HEIGHT: 65 IN | DIASTOLIC BLOOD PRESSURE: 62 MMHG

## 2021-01-12 DIAGNOSIS — Z71.2 ENCOUNTER TO DISCUSS TEST RESULTS: Primary | ICD-10-CM

## 2021-01-12 PROCEDURE — 99213 OFFICE O/P EST LOW 20 MIN: CPT | Performed by: FAMILY MEDICINE

## 2021-01-12 ASSESSMENT — MIFFLIN-ST. JEOR: SCORE: 1529.75

## 2021-01-12 NOTE — PATIENT INSTRUCTIONS
Cleared for surgery, Heart looks great!     Best of luck tomorrow!      Thank you for choosing [facility].  You may be receiving an email and/or telephone survey request from [facility] Customer Experience regarding your visit today.  Please take a few minutes to respond to the survey to let us know how we are doing.      If you have questions or concerns, please contact us via [email or web address] or you can contact your care team at [phone].    Our Clinic hours are:  Monday 6:40 am  to 7:00 pm  Tuesday -Friday 6:40 am to 5:00 pm    The [place] outpatient lab hours are:  Monday - Friday 6:10 am to 4:45 pm  Saturdays 7:00 am to 11:00 am  Appointments are required, call [phone]    If you have clinical questions after hours or would like to schedule an appointment,  call the clinic at [phone].

## 2021-01-12 NOTE — PROGRESS NOTES
"  Assessment & Plan     Encounter to discuss test results  - Patient here to discuss most recent Cardiac stress test which returned satisfactory with no signs of inducible ischemia and ejection fraction 75% under stress.  Patient with a procedure scheduled for tomorrow and he is cleared from a cardiac standpoint.      Douglas Marrero DO  [facility] is a 60 year old who presents to clinic today for the following health issues     HPI   Patient here to clinic for follow-up of cardiac stress test which was completed on 1/11/2021. Overall the stress test was negative for inducible ischemia, and ejection fraction of 75% with exertion.  He denies any symptoms of chest pain, shortness of breath, lightheadedness or dizziness.  Vitals are within normal limits.  Patient is scheduled for surgery tomorrow and is cleared for surgery.    Patient reports he is in a rush today and does not wish to discuss any of his other chronic medical issues. At last visit advised patient to bring his medications with him to this appointment to ensure what he is taking matches what is on his list but patient forgot to bring them in.     Review of Systems   Constitutional, HEENT, cardiovascular, pulmonary, gi and gu systems are negative, except as otherwise noted.      Objective    /62 (BP Location: Right arm, Patient Position: Chair, Cuff Size: Adult Regular)   Pulse 97   Temp 97.3  F (36.3  C) (Tympanic)   Resp 16   Ht 1.651 m (5' 5\")   Wt 80.3 kg (177 lb)   SpO2 98%   BMI 29.45 kg/m    Body mass index is 29.45 kg/m .  Physical Exam   General: alert, cooperative, no acute distress   CV: RRR, no murmur  Resp: non-labored breathing, clear to auscultation, no wheezing or rales   "

## 2021-01-13 ENCOUNTER — HOSPITAL ENCOUNTER (OUTPATIENT)
Facility: CLINIC | Age: 61
Discharge: HOME | End: 2021-01-13
Attending: UROLOGY | Admitting: UROLOGY
Payer: COMMERCIAL

## 2021-01-13 ENCOUNTER — ANESTHESIA (OUTPATIENT)
Dept: SURGERY | Facility: CLINIC | Age: 61
End: 2021-01-13
Payer: COMMERCIAL

## 2021-01-13 ENCOUNTER — APPOINTMENT (OUTPATIENT)
Dept: GENERAL RADIOLOGY | Facility: CLINIC | Age: 61
End: 2021-01-13
Attending: UROLOGY
Payer: COMMERCIAL

## 2021-01-13 ENCOUNTER — ANESTHESIA EVENT (OUTPATIENT)
Dept: SURGERY | Facility: CLINIC | Age: 61
End: 2021-01-13
Payer: COMMERCIAL

## 2021-01-13 VITALS
RESPIRATION RATE: 16 BRPM | TEMPERATURE: 98 F | HEART RATE: 84 BPM | WEIGHT: 177.91 LBS | BODY MASS INDEX: 29.64 KG/M2 | OXYGEN SATURATION: 98 % | SYSTOLIC BLOOD PRESSURE: 147 MMHG | DIASTOLIC BLOOD PRESSURE: 70 MMHG | HEIGHT: 65 IN

## 2021-01-13 DIAGNOSIS — S22.41XA CLOSED FRACTURE OF MULTIPLE RIBS OF RIGHT SIDE, INITIAL ENCOUNTER: ICD-10-CM

## 2021-01-13 DIAGNOSIS — K21.9 GASTROESOPHAGEAL REFLUX DISEASE WITHOUT ESOPHAGITIS: ICD-10-CM

## 2021-01-13 DIAGNOSIS — N20.0 KIDNEY STONE: Primary | ICD-10-CM

## 2021-01-13 LAB
ABO + RH BLD: NORMAL
ABO + RH BLD: NORMAL
ANION GAP SERPL CALCULATED.3IONS-SCNC: 9 MMOL/L (ref 3–14)
APTT PPP: 31 SEC (ref 22–37)
BLD GP AB SCN SERPL QL: NORMAL
BLOOD BANK CMNT PATIENT-IMP: NORMAL
BUN SERPL-MCNC: 27 MG/DL (ref 7–30)
CALCIUM SERPL-MCNC: 9.1 MG/DL (ref 8.5–10.1)
CHLORIDE SERPL-SCNC: 107 MMOL/L (ref 94–109)
CO2 SERPL-SCNC: 23 MMOL/L (ref 20–32)
CREAT SERPL-MCNC: 2.28 MG/DL (ref 0.66–1.25)
ERYTHROCYTE [DISTWIDTH] IN BLOOD BY AUTOMATED COUNT: 21.5 % (ref 10–15)
GFR SERPL CREATININE-BSD FRML MDRD: 30 ML/MIN/{1.73_M2}
GLUCOSE BLDC GLUCOMTR-MCNC: 106 MG/DL (ref 70–99)
GLUCOSE SERPL-MCNC: 106 MG/DL (ref 70–99)
HCT VFR BLD AUTO: 28 % (ref 40–53)
HGB BLD-MCNC: 8.3 G/DL (ref 13.3–17.7)
INR PPP: 1.34 (ref 0.86–1.14)
MCH RBC QN AUTO: 26.2 PG (ref 26.5–33)
MCHC RBC AUTO-ENTMCNC: 29.6 G/DL (ref 31.5–36.5)
MCV RBC AUTO: 88 FL (ref 78–100)
PLATELET # BLD AUTO: 102 10E9/L (ref 150–450)
POTASSIUM SERPL-SCNC: 3.1 MMOL/L (ref 3.4–5.3)
RBC # BLD AUTO: 3.17 10E12/L (ref 4.4–5.9)
SODIUM SERPL-SCNC: 140 MMOL/L (ref 133–144)
SPECIMEN EXP DATE BLD: NORMAL
WBC # BLD AUTO: 10.6 10E9/L (ref 4–11)

## 2021-01-13 PROCEDURE — 258N000003 HC RX IP 258 OP 636: Performed by: NURSE ANESTHETIST, CERTIFIED REGISTERED

## 2021-01-13 PROCEDURE — 250N000025 HC SEVOFLURANE, PER MIN: Performed by: UROLOGY

## 2021-01-13 PROCEDURE — 86850 RBC ANTIBODY SCREEN: CPT | Performed by: UROLOGY

## 2021-01-13 PROCEDURE — 86901 BLOOD TYPING SEROLOGIC RH(D): CPT | Performed by: UROLOGY

## 2021-01-13 PROCEDURE — 80048 BASIC METABOLIC PNL TOTAL CA: CPT | Performed by: UROLOGY

## 2021-01-13 PROCEDURE — 710N000012 HC RECOVERY PHASE 2, PER MINUTE: Performed by: UROLOGY

## 2021-01-13 PROCEDURE — 87070 CULTURE OTHR SPECIMN AEROBIC: CPT | Performed by: UROLOGY

## 2021-01-13 PROCEDURE — 86900 BLOOD TYPING SEROLOGIC ABO: CPT | Performed by: UROLOGY

## 2021-01-13 PROCEDURE — 250N000011 HC RX IP 250 OP 636: Performed by: ANESTHESIOLOGY

## 2021-01-13 PROCEDURE — 360N000083 HC SURGERY LEVEL 3 W/ FLUORO, PER MIN: Performed by: UROLOGY

## 2021-01-13 PROCEDURE — 999N001017 HC STATISTIC GLUCOSE BY METER IP

## 2021-01-13 PROCEDURE — C2617 STENT, NON-COR, TEM W/O DEL: HCPCS | Performed by: UROLOGY

## 2021-01-13 PROCEDURE — 36415 COLL VENOUS BLD VENIPUNCTURE: CPT | Performed by: UROLOGY

## 2021-01-13 PROCEDURE — C1894 INTRO/SHEATH, NON-LASER: HCPCS | Performed by: UROLOGY

## 2021-01-13 PROCEDURE — 250N000011 HC RX IP 250 OP 636: Performed by: NURSE ANESTHETIST, CERTIFIED REGISTERED

## 2021-01-13 PROCEDURE — 85027 COMPLETE CBC AUTOMATED: CPT | Performed by: UROLOGY

## 2021-01-13 PROCEDURE — 370N000017 HC ANESTHESIA TECHNICAL FEE, PER MIN: Performed by: UROLOGY

## 2021-01-13 PROCEDURE — 250N000009 HC RX 250: Performed by: NURSE ANESTHETIST, CERTIFIED REGISTERED

## 2021-01-13 PROCEDURE — 82365 CALCULUS SPECTROSCOPY: CPT | Performed by: UROLOGY

## 2021-01-13 PROCEDURE — 250N000013 HC RX MED GY IP 250 OP 250 PS 637: Performed by: ANESTHESIOLOGY

## 2021-01-13 PROCEDURE — 250N000011 HC RX IP 250 OP 636: Performed by: UROLOGY

## 2021-01-13 PROCEDURE — 999N000179 XR SURGERY CARM FLUORO LESS THAN 5 MIN W STILLS: Mod: TC

## 2021-01-13 PROCEDURE — 255N000002 HC RX 255 OP 636: Performed by: UROLOGY

## 2021-01-13 PROCEDURE — 710N000010 HC RECOVERY PHASE 1, LEVEL 2, PER MIN: Performed by: UROLOGY

## 2021-01-13 PROCEDURE — 85730 THROMBOPLASTIN TIME PARTIAL: CPT | Performed by: UROLOGY

## 2021-01-13 PROCEDURE — 87106 FUNGI IDENTIFICATION YEAST: CPT | Performed by: UROLOGY

## 2021-01-13 PROCEDURE — C1769 GUIDE WIRE: HCPCS | Performed by: UROLOGY

## 2021-01-13 PROCEDURE — 999N000141 HC STATISTIC PRE-PROCEDURE NURSING ASSESSMENT: Performed by: UROLOGY

## 2021-01-13 PROCEDURE — 272N000001 HC OR GENERAL SUPPLY STERILE: Performed by: UROLOGY

## 2021-01-13 PROCEDURE — 85610 PROTHROMBIN TIME: CPT | Performed by: UROLOGY

## 2021-01-13 DEVICE — STENT URETERAL PERCUFLEX PLUS 7FRX26CM M0061752730: Type: IMPLANTABLE DEVICE | Site: URETER | Status: FUNCTIONAL

## 2021-01-13 RX ORDER — TAMSULOSIN HYDROCHLORIDE 0.4 MG/1
0.4 CAPSULE ORAL DAILY
Qty: 7 CAPSULE | Refills: 0 | Status: SHIPPED | OUTPATIENT
Start: 2021-01-13 | End: 2021-02-09

## 2021-01-13 RX ORDER — ACETAMINOPHEN 325 MG/1
975 TABLET ORAL ONCE
Status: COMPLETED | OUTPATIENT
Start: 2021-01-13 | End: 2021-01-13

## 2021-01-13 RX ORDER — SODIUM CHLORIDE, SODIUM LACTATE, POTASSIUM CHLORIDE, CALCIUM CHLORIDE 600; 310; 30; 20 MG/100ML; MG/100ML; MG/100ML; MG/100ML
INJECTION, SOLUTION INTRAVENOUS CONTINUOUS PRN
Status: DISCONTINUED | OUTPATIENT
Start: 2021-01-13 | End: 2021-01-13

## 2021-01-13 RX ORDER — NALOXONE HYDROCHLORIDE 0.4 MG/ML
0.2 INJECTION, SOLUTION INTRAMUSCULAR; INTRAVENOUS; SUBCUTANEOUS
Status: DISCONTINUED | OUTPATIENT
Start: 2021-01-13 | End: 2021-01-13 | Stop reason: HOSPADM

## 2021-01-13 RX ORDER — FLUCONAZOLE 200 MG/1
200 TABLET ORAL DAILY
Qty: 21 TABLET | Refills: 0 | Status: SHIPPED | OUTPATIENT
Start: 2021-01-13 | End: 2021-02-03

## 2021-01-13 RX ORDER — ONDANSETRON 4 MG/1
4 TABLET, ORALLY DISINTEGRATING ORAL EVERY 30 MIN PRN
Status: DISCONTINUED | OUTPATIENT
Start: 2021-01-13 | End: 2021-01-13 | Stop reason: HOSPADM

## 2021-01-13 RX ORDER — NALOXONE HYDROCHLORIDE 0.4 MG/ML
0.4 INJECTION, SOLUTION INTRAMUSCULAR; INTRAVENOUS; SUBCUTANEOUS
Status: DISCONTINUED | OUTPATIENT
Start: 2021-01-13 | End: 2021-01-13 | Stop reason: HOSPADM

## 2021-01-13 RX ORDER — DEXAMETHASONE SODIUM PHOSPHATE 4 MG/ML
INJECTION, SOLUTION INTRA-ARTICULAR; INTRALESIONAL; INTRAMUSCULAR; INTRAVENOUS; SOFT TISSUE PRN
Status: DISCONTINUED | OUTPATIENT
Start: 2021-01-13 | End: 2021-01-13

## 2021-01-13 RX ORDER — SODIUM CHLORIDE, SODIUM LACTATE, POTASSIUM CHLORIDE, CALCIUM CHLORIDE 600; 310; 30; 20 MG/100ML; MG/100ML; MG/100ML; MG/100ML
INJECTION, SOLUTION INTRAVENOUS CONTINUOUS
Status: DISCONTINUED | OUTPATIENT
Start: 2021-01-13 | End: 2021-01-13 | Stop reason: HOSPADM

## 2021-01-13 RX ORDER — ONDANSETRON 2 MG/ML
4 INJECTION INTRAMUSCULAR; INTRAVENOUS EVERY 30 MIN PRN
Status: DISCONTINUED | OUTPATIENT
Start: 2021-01-13 | End: 2021-01-13 | Stop reason: HOSPADM

## 2021-01-13 RX ORDER — ESMOLOL HYDROCHLORIDE 10 MG/ML
INJECTION INTRAVENOUS PRN
Status: DISCONTINUED | OUTPATIENT
Start: 2021-01-13 | End: 2021-01-13

## 2021-01-13 RX ORDER — FENTANYL CITRATE 50 UG/ML
25-50 INJECTION, SOLUTION INTRAMUSCULAR; INTRAVENOUS
Status: DISCONTINUED | OUTPATIENT
Start: 2021-01-13 | End: 2021-01-13 | Stop reason: HOSPADM

## 2021-01-13 RX ORDER — ONDANSETRON 2 MG/ML
INJECTION INTRAMUSCULAR; INTRAVENOUS PRN
Status: DISCONTINUED | OUTPATIENT
Start: 2021-01-13 | End: 2021-01-13

## 2021-01-13 RX ORDER — CEFAZOLIN SODIUM 1 G/3ML
1 INJECTION, POWDER, FOR SOLUTION INTRAMUSCULAR; INTRAVENOUS SEE ADMIN INSTRUCTIONS
Status: DISCONTINUED | OUTPATIENT
Start: 2021-01-13 | End: 2021-01-13 | Stop reason: HOSPADM

## 2021-01-13 RX ORDER — PROPOFOL 10 MG/ML
INJECTION, EMULSION INTRAVENOUS PRN
Status: DISCONTINUED | OUTPATIENT
Start: 2021-01-13 | End: 2021-01-13

## 2021-01-13 RX ORDER — CEFAZOLIN SODIUM 2 G/100ML
2 INJECTION, SOLUTION INTRAVENOUS
Status: COMPLETED | OUTPATIENT
Start: 2021-01-13 | End: 2021-01-13

## 2021-01-13 RX ORDER — LIDOCAINE 40 MG/G
CREAM TOPICAL
Status: DISCONTINUED | OUTPATIENT
Start: 2021-01-13 | End: 2021-01-13 | Stop reason: HOSPADM

## 2021-01-13 RX ORDER — FENTANYL CITRATE 50 UG/ML
INJECTION, SOLUTION INTRAMUSCULAR; INTRAVENOUS PRN
Status: DISCONTINUED | OUTPATIENT
Start: 2021-01-13 | End: 2021-01-13

## 2021-01-13 RX ORDER — FLUCONAZOLE 2 MG/ML
400 INJECTION, SOLUTION INTRAVENOUS
Status: COMPLETED | OUTPATIENT
Start: 2021-01-13 | End: 2021-01-13

## 2021-01-13 RX ORDER — MEPERIDINE HYDROCHLORIDE 25 MG/ML
12.5 INJECTION INTRAMUSCULAR; INTRAVENOUS; SUBCUTANEOUS
Status: DISCONTINUED | OUTPATIENT
Start: 2021-01-13 | End: 2021-01-13 | Stop reason: HOSPADM

## 2021-01-13 RX ORDER — HYDROMORPHONE HYDROCHLORIDE 1 MG/ML
.3-.5 INJECTION, SOLUTION INTRAMUSCULAR; INTRAVENOUS; SUBCUTANEOUS EVERY 10 MIN PRN
Status: DISCONTINUED | OUTPATIENT
Start: 2021-01-13 | End: 2021-01-13 | Stop reason: HOSPADM

## 2021-01-13 RX ORDER — LIDOCAINE HYDROCHLORIDE 20 MG/ML
INJECTION, SOLUTION INFILTRATION; PERINEURAL PRN
Status: DISCONTINUED | OUTPATIENT
Start: 2021-01-13 | End: 2021-01-13

## 2021-01-13 RX ADMIN — PHENYLEPHRINE HYDROCHLORIDE 150 MCG: 10 INJECTION INTRAVENOUS at 15:35

## 2021-01-13 RX ADMIN — PHENYLEPHRINE HYDROCHLORIDE 100 MCG: 10 INJECTION INTRAVENOUS at 15:11

## 2021-01-13 RX ADMIN — ONDANSETRON 4 MG: 2 INJECTION INTRAMUSCULAR; INTRAVENOUS at 14:07

## 2021-01-13 RX ADMIN — FENTANYL CITRATE 100 MCG: 50 INJECTION, SOLUTION INTRAMUSCULAR; INTRAVENOUS at 14:06

## 2021-01-13 RX ADMIN — LIDOCAINE HYDROCHLORIDE 100 MG: 20 INJECTION, SOLUTION INFILTRATION; PERINEURAL at 14:06

## 2021-01-13 RX ADMIN — MIDAZOLAM 1 MG: 1 INJECTION INTRAMUSCULAR; INTRAVENOUS at 13:54

## 2021-01-13 RX ADMIN — SUGAMMADEX 200 MG: 100 INJECTION, SOLUTION INTRAVENOUS at 16:05

## 2021-01-13 RX ADMIN — ACETAMINOPHEN 975 MG: 325 TABLET, FILM COATED ORAL at 13:54

## 2021-01-13 RX ADMIN — FENTANYL CITRATE 50 MCG: 50 INJECTION, SOLUTION INTRAMUSCULAR; INTRAVENOUS at 15:37

## 2021-01-13 RX ADMIN — PHENYLEPHRINE HYDROCHLORIDE 150 MCG: 10 INJECTION INTRAVENOUS at 15:23

## 2021-01-13 RX ADMIN — FENTANYL CITRATE 100 MCG: 50 INJECTION, SOLUTION INTRAMUSCULAR; INTRAVENOUS at 14:52

## 2021-01-13 RX ADMIN — FLUCONAZOLE IN SODIUM CHLORIDE 400 MG: 2 INJECTION, SOLUTION INTRAVENOUS at 13:06

## 2021-01-13 RX ADMIN — ESMOLOL HYDROCHLORIDE 30 MG: 10 INJECTION, SOLUTION INTRAVENOUS at 16:18

## 2021-01-13 RX ADMIN — PROPOFOL 80 MG: 10 INJECTION, EMULSION INTRAVENOUS at 14:07

## 2021-01-13 RX ADMIN — PHENYLEPHRINE HYDROCHLORIDE 150 MCG: 10 INJECTION INTRAVENOUS at 15:59

## 2021-01-13 RX ADMIN — ROCURONIUM BROMIDE 50 MG: 10 INJECTION INTRAVENOUS at 14:08

## 2021-01-13 RX ADMIN — SODIUM CHLORIDE, POTASSIUM CHLORIDE, SODIUM LACTATE AND CALCIUM CHLORIDE: 600; 310; 30; 20 INJECTION, SOLUTION INTRAVENOUS at 13:59

## 2021-01-13 RX ADMIN — PHENYLEPHRINE HYDROCHLORIDE 100 MCG: 10 INJECTION INTRAVENOUS at 15:00

## 2021-01-13 RX ADMIN — DEXAMETHASONE SODIUM PHOSPHATE 8 MG: 4 INJECTION, SOLUTION INTRA-ARTICULAR; INTRALESIONAL; INTRAMUSCULAR; INTRAVENOUS; SOFT TISSUE at 14:07

## 2021-01-13 RX ADMIN — SODIUM CHLORIDE, POTASSIUM CHLORIDE, SODIUM LACTATE AND CALCIUM CHLORIDE: 600; 310; 30; 20 INJECTION, SOLUTION INTRAVENOUS at 14:22

## 2021-01-13 RX ADMIN — CEFAZOLIN 2 G: 10 INJECTION, POWDER, FOR SOLUTION INTRAVENOUS at 14:30

## 2021-01-13 ASSESSMENT — COPD QUESTIONNAIRES
COPD: 1
CAT_SEVERITY: MILD

## 2021-01-13 ASSESSMENT — MIFFLIN-ST. JEOR: SCORE: 1533.88

## 2021-01-13 NOTE — OR NURSING
Pt arrived to PACU 1620. Refused continuing SCDs, refused continuous SpO2 monitor. Denies SOB/DOSHI and requests no capno monitoring. Cooperative but verbalizing frustration with waiting 45 min to recover in PACU.    Dr Hale bedside in PACU, reviewed procedure with patient and plan to have him remove stent by pulling string coming out of his penis in 1 week. Ok for patient to discharge home, no need to see him again today. Will call to schedule follow up. No pain meds beyond ibuprofen/tylenol for post op pain, if any. Will be sent home with flomax and diflucan scripts, being filled at discharge pharmacy

## 2021-01-13 NOTE — OP NOTE
OPERATIVE REPORT    PREOPERATIVE DIAGNOSIS:  Left renal stones(s)    POSTOPERATIVE DIAGNOSIS: Same    PROCEDURES PERFORMED:   Cystourethroscopy  Left ureteroscopy  Left holmium laser lithotripsy with basket extraction  Left retrograde pyelogram  Left ureteral stent placement    STAFF SURGEON: Adrian Hale MD  RESIDENT(S): Manish Bill MD    ANESTHESIA: General  ESTIMATED BLOOD LOSS: 1 ml  COMPLICATIONS: None.   SPECIMEN: Stone for analysis and culture  URETERAL STENT(S):  - Left 7 x 26 cm double-J ureteral stent.  Reason for stenting: Large stone burden and persistent but small (< 2 mm) fragments and suspected fungal debris.      SIGNIFICANT FINDINGS:   -Stone free with no fragments > 2mm on the left  -Left RPG notable for hydronephrosis, most notably a very dilated upper pole    Target stone location:Kidney    Stone opacity on fluoroscopy: Radiolucent    Approximate target stone size: 10-15 mm    Laser used: Yes    Multiple stones treated: Yes      BRIEF OPERATIVE INDICATIONS: Abhijeet Mccauley is a(n) 60 year old male who presented with an infected obstructing upper pole stone.  He was previously treated with first stage ureteroscopy owing to the fact that he is a poor surgical candidate for PCNL.  After a discussion of all risks, benefits, and alternatives, the patient elected to proceed with definitive stone management. The patient understands the potential need for more than one procedure to eliminate all stone burden.      DESCRIPTION OF PROCEDURE:  After informed consent was obtained, the patient was transported to the operating room & placed supine on the table. After adequate anesthesia was induced, the patient was placed in lithotomy and prepped and draped in the usual sterile fashion. A timeout was taken to confirm correct patient, procedure and laterality. Pre-operative IV antibiotics were administered.     A high definition fluoroscopic image was taken to determine stone opacity. The stone was  noted to be radiolucent.    A 22-Zimbabwean rigid cystoscope was inserted into a well-lubricated urethra. The urethra was notable for a mild bulbar stricture which was easily passed.       The existing left indwelling ureteral stent was identified and removed with the flexible stent grasper to the level of the urethral meatus. A Sensor wire was advanced up to the renal pelvis under fluoroscopic guidance and the previous stent was removed.      We passed the flexible ureteroscope adjacent to the wire, identifying several ureteral stone fragments which we cleared with a basket prior to passing a 12/14 46 cm sheath to the proximal ureter.    We then looked into the kidney under pressurized saline irrigation identifying a large stone burden.  We used the 200 micron holmium laser to fragment the remaining upper pole stone and then spent over 90 minutes basket extracting residual pieces.  Much of the stone was soft and had a texture consistent with mucoid matrix.  We were able to clear the majority though there was substantial small pieces of debris at the end of the procedure too small to extract.  We performed pullback ureteroscopy noting a patent uninjured ureter.    We passed a 7 x 26 double J stent ensuring full renal and bladder curls and then emptied the bladder.  We did leave a string on the stent to facilitate removal.    The patient tolerated the procedure well and there were no apparent complications. The patient  was transported to the postanesthesia care unit in stable condition.        POSTOP PLAN:  -Discharge home once PACU criteria are met  -Patient may remove stent via string in one week (on 1/20/21)  -Diflucan x 3 weeks  -Follow up with Dr. Hale in 4 weeks with CT prior        30-60d follow-up imaging: CT abdomen/pelvis in 4 weeks to evaluate for persistent stones/fungal debris which was noted to be radiolucent on fluoroscopy      Disposable items prior to timeout:  Sensor wire  5-Zimbabwean open ended  catheter.    I, Adrian Hale, was present and participatory for the entirety of the procedure

## 2021-01-13 NOTE — DISCHARGE INSTRUCTIONS
[facility], [place] // Same-Day Surgery // Adult Discharge Orders & Instructions     Take it easy when you get home.  Remember, same day surgery DOES NOT MEAN SAME DAY RECOVERY! Healing is a gradual process.   You will need some time to recover- you may be more tired than you realize at first.  Rest and relax for at least the first 24 hours at home.  You'll feel better and heal faster if you take good care of yourself.     For 24 hours after surgery    1. Get plenty of rest.  A responsible adult must stay with you for at least 24 hours after you leave the hospital.   2. Do not drive or use heavy equipment.  If you have weakness or tingling, don't drive or use heavy equipment until this feeling goes away.  3. Do not drink alcohol.  4. Avoid strenuous or risky activities.  Ask for help when climbing stairs.   5. You may feel lightheaded.  IF so, sit for a few minutes before standing.  Have someone help you get up.   6. If you have nausea (feel sick to your stomach): Drink only clear liquids such as apple juice, ginger ale, broth or 7-Up.  Rest may also help.  Be sure to drink enough fluids.  Move to a regular diet as you feel able.  7. You may have a slight fever. Call the doctor if your fever is over 100 F (37.7 C) (taken under the tongue) or lasts longer than 24 hours.  8. You may have a dry mouth, a sore throat, muscle aches or trouble sleeping.  These should go away after 24 hours.  9. Do not make important or legal decisions.     Call your doctor for any of the followin.  Signs of infection (fever, growing tenderness at the surgery site, a large amount of drainage or bleeding, severe pain, foul-smelling drainage, redness, swelling).  2. It has been over 8 to 10 hours since surgery and you are still not able to urinate (pass water).  3.  Headache for over 24 hours.    To contact a doctor, call:  Dr. Adrian Hale @ [phone] at the Urology Clinic from 8 am till 5  pm    [phone] and ask for the resident on call for Urology (answered 24 hours a day)    Emergency Department: [facility]: [phone] (TTY for hearing impaired: [phone])

## 2021-01-13 NOTE — TELEPHONE ENCOUNTER
Med was prescribed by urology, I believe in regards to his kidney stone procedure.  I'm not sure how long this was to be continued.  Procedure was rescheduled for today.  He should contact urology to clarify.    Thanks,  Chidi Valenzuela MD

## 2021-01-13 NOTE — ANESTHESIA CARE TRANSFER NOTE
Patient: Abhijeet Mccauley    Procedure(s):  LEFT CYSTOURETEROSCOPY, WITH RETROGRADE PYELOGRAM, HOLMIUM LASER LITHOTRIPSY AND STENT EXCHANGE    Diagnosis: Kidney stone [N20.0]  Diagnosis Additional Information: No value filed.    Anesthesia Type:   General     Note:  Airway :Nasal Cannula  Patient transferred to:PACU  Comments: VSS,  Report to RN  Handoff Report: Identifed the Patient, Identified the Reponsible Provider, Reviewed the pertinent medical history, Discussed the surgical course, Reviewed Intra-OP anesthesia mangement and issues during anesthesia, Set expectations for post-procedure period and Allowed opportunity for questions and acknowledgement of understanding      Vitals: (Last set prior to Anesthesia Care Transfer)    CRNA VITALS  1/13/2021 1549 - 1/13/2021 1623      1/13/2021             Pulse:  94    Ht Rate:  93    SpO2:  97 %    Resp Rate (observed):  (!) 2                Electronically Signed By: LOPEZ Junior CRNA  January 13, 2021  4:23 PM

## 2021-01-13 NOTE — ANESTHESIA POSTPROCEDURE EVALUATION
Anesthesia POST Procedure Evaluation    Patient: Abhijeet Mccauley   MRN:     [id number] Gender:   male   Age:    60 year old :      1960        Preoperative Diagnosis: Kidney stone [N20.0]   Procedure(s):  LEFT CYSTOURETEROSCOPY, WITH RETROGRADE PYELOGRAM, HOLMIUM LASER LITHOTRIPSY AND STENT EXCHANGE   Postop Comments: No value filed.     Anesthesia Type: General       Disposition: Outpatient   Postop Pain Control: Uneventful            Sign Out: Well controlled pain   PONV: No   Neuro/Psych: Uneventful            Sign Out: Acceptable/Baseline neuro status   Airway/Respiratory: Uneventful            Sign Out: Acceptable/Baseline resp. status   CV/Hemodynamics: Uneventful            Sign Out: Acceptable CV status   Other NRE: NONE   DID A NON-ROUTINE EVENT OCCUR? No         Last Anesthesia Record Vitals:  CRNA VITALS  2021 1549 - 2021 1649      2021             Pulse:  94    Ht Rate:  93    SpO2:  97 %    Resp Rate (observed):  (!) 2          Last PACU Vitals:  Vitals Value Taken Time   /74 21 1645   Temp 37.1  C (98.78  F) 21 1638   Pulse 83 21 1657   Resp 16 21 1645   SpO2 98 % 21 1645   Temp src     NIBP     Pulse     SpO2     Resp     Temp     Ht Rate     Temp 2     Vitals shown include unvalidated device data.      Electronically Signed By: Martina Miller MD, 2021, 4:58 PM

## 2021-01-13 NOTE — BRIEF OP NOTE
[facility]     Brief Operative Note    Pre-operative diagnosis: Kidney stone [N20.0]  Post-operative diagnosis Same as pre-operative diagnosis    Procedure: Procedure(s):  LEFT CYSTOURETEROSCOPY, WITH RETROGRADE PYELOGRAM, HOLMIUM LASER LITHOTRIPSY AND STENT EXCHANGE  Surgeon: Surgeon(s) and Role:     * Adrian Hale MD - Primary     * Manish Bill MD - Resident - Assisting  Anesthesia: General   Estimated blood loss:  1 mL  Drains:  Left 7 X 26 cm double J ureteral stent on string  Specimens:   ID Type Source Tests Collected by Time Destination   1 : left kidney stones  Calculus/Stone Kidney, Left MISCELLANEOUS CULTURE AEROBIC BACTERIAL, STONE ANALYSIS Adrian Hale MD 1/13/2021  4:01 PM      Findings:   large amount of soft dark suspected fungal debris (fungus ball) as well as some dark harder stone fragments; small pieces of debris remained but no fragments > 2 mm at end of case; stent with good curls.  Complications: None.  Implants:   Implant Name Type Inv. Item Serial No.  Lot No. LRB No. Used Action   STENT URETERAL PERCUFLEX PLUS [id number] Stent STENT URETERAL PERCUFLEX PLUS [id number]  [employer] [id number] Left 1 Explanted   STENT URETERAL PERCUFLEX PLUS [id number] Stent STENT URETERAL PERCUFLEX PLUS [id number]  [employer] [id number] Left 1 Implanted         Post-op plan:  -Discharge home once PACU criteria are met  -Patient may remove stent via string in one week (on 1/20/21)  -Diflucan x 3 weeks  -Follow up with Dr. Hale in 4 weeks with CT prior

## 2021-01-13 NOTE — OR NURSING
Discharge instructions provided and reviewed with Rowena (sister), designated caregiver. Printed after visit summary sent home with patient. Understanding verbalized.  2 scripts will be picked up on way to door.    [facility] [employer] transport en route to drive patient back to [place], [place]. ETA to [facility] is [phone]

## 2021-01-13 NOTE — ANESTHESIA PROCEDURE NOTES
Airway   Date/Time: 1/13/2021 2:09 PM   Patient location during procedure: OR    Staff -   CRNA: Jero Godoy APRN CRNA  Performed By: CRNA    Consent for Airway   Urgency: elective    Indications and Patient Condition  Indications for airway management: yana-procedural  Induction type:intravenousMask difficulty assessment: 1 - vent by mask    Final Airway Details  Final airway type: endotracheal airway  Successful airway:ETT - single  Endotracheal Airway Details   ETT size (mm): 7.5  Cuffed: yes  Cuff volume (mL): 6  Successful intubation technique: direct laryngoscopy  Grade View of Cords: 1  Adjucts: stylet  Measured from: lips  Secured at (cm): 22  Secured with: pink tape  Bite block used: None    Post intubation assessment   Placement verified by: capnometry, equal breath sounds and chest rise   Number of attempts at approach: 1  Secured with:pink tape  Ease of procedure: easy  Dentition: Intact (Blood noted at corner of mouth prior to intubation. MDA aware.)
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
## 2021-01-14 ENCOUNTER — TELEPHONE (OUTPATIENT)
Dept: AUDIOLOGY | Facility: CLINIC | Age: 61
End: 2021-01-14

## 2021-01-14 NOTE — TELEPHONE ENCOUNTER
Pt was prescribed flomax at surgery yesterday.   Cinthya MONDRAGON RN BSN PHN  [facility]

## 2021-01-14 NOTE — TELEPHONE ENCOUNTER
"Requested Prescriptions   Pending Prescriptions Disp Refills     pantoprazole (PROTONIX) 40 MG EC tablet [Pharmacy Med Name: PANTOPRAZOLE SODIUM 40MG TBEC] 120 tablet 0     Sig: TAKE ONE TABLET BY MOUTH TWICE A DAY       PPI Protocol Passed - 1/13/2021  7:20 PM        Passed - Not on Clopidogrel (unless Pantoprazole ordered)        Passed - No diagnosis of osteoporosis on record        Passed - Recent (12 mo) or future (30 days) visit within the authorizing provider's specialty     Patient has had an office visit with the authorizing provider or a provider within the authorizing providers department within the previous 12 mos or has a future within next 30 days. See \"Patient Info\" tab in inbasket, or \"Choose Columns\" in Meds & Orders section of the refill encounter.              Passed - Medication is active on med list        Passed - Patient is age 18 or older             "

## 2021-01-14 NOTE — TELEPHONE ENCOUNTER
Reason for Call:  Other call back    Detailed comments: karlene calling stating pt lost a HA and is needing the other one cleaned. Would like to know how long it takes to get a cleaning done? It is same day or a couple day turn around time?     Phone Number Patient can be reached at: Other phone number:  [phone] - Karlene*    Best Time: any     Can we leave a detailed message on this number? YES    Call taken on 1/14/2021 at 3:56 PM by Elena Jin

## 2021-01-15 LAB
BACTERIA SPEC CULT: ABNORMAL
BACTERIA SPEC CULT: ABNORMAL
SPECIMEN SOURCE: ABNORMAL

## 2021-01-15 RX ORDER — PANTOPRAZOLE SODIUM 40 MG/1
40 TABLET, DELAYED RELEASE ORAL 2 TIMES DAILY
Qty: 180 TABLET | Refills: 0 | Status: SHIPPED | OUTPATIENT
Start: 2021-01-15 | End: 2021-04-29

## 2021-01-15 NOTE — TELEPHONE ENCOUNTER
Prescription approved per [facility] Refill Protocol.    Angelina HDZ RN, BSN

## 2021-01-16 LAB
APPEARANCE STONE: NORMAL
COMPN STONE: NORMAL
NUMBER STONE: 1
SIZE STONE: NORMAL MM
WT STONE: 71 MG

## 2021-01-18 DIAGNOSIS — F41.9 ANXIETY: ICD-10-CM

## 2021-01-18 NOTE — TELEPHONE ENCOUNTER
"Requested Prescriptions   Pending Prescriptions Disp Refills     QUEtiapine (SEROQUEL) 50 MG tablet [Pharmacy Med Name: QUETIAPINE FUMARATE 50MG TABS] 30 tablet 3     Sig: TAKE ONE TABLET BY MOUTH AT BEDTIME       Antipsychotic Medications Failed - 1/18/2021  8:14 AM        Failed - Blood pressure under 140/90 in past 12 months     BP Readings from Last 3 Encounters:   01/13/21 (!) 147/70   01/12/21 122/62   01/05/21 139/70                 Failed - Medication is active on med list        Passed - Patient is 12 years of age or older        Passed - Lipid panel on file within the past 12 months     Recent Labs   Lab Test 02/19/20  1508   CHOL 200*   TRIG 75   HDL 76   *   NHDL 124               Passed - CBC on file in past 12 months     Recent Labs   Lab Test 01/13/21  1205   WBC 10.6   RBC 3.17*   HGB 8.3*   HCT 28.0*   *                 Passed - Heart Rate on file within past 12 months     Pulse Readings from Last 3 Encounters:   01/13/21 84   01/12/21 97   01/05/21 103               Passed - A1c or Glucose on file in past 12 months     Recent Labs   Lab Test 01/13/21  1205 06/16/20  0344 06/16/20  0344   *   < > 122*   A1C  --   --  Canceled, Test credited    < > = values in this interval not displayed.       Please review patients last 3 weights. If a weight gain of >10 lbs exists, you may refill the prescription once after instructing the patient to schedule an appointment within the next 30 days.    Wt Readings from Last 3 Encounters:   01/13/21 80.7 kg (177 lb 14.6 oz)   01/12/21 80.3 kg (177 lb)   01/11/21 82.1 kg (181 lb)             Passed - Recent (6 mo) or future (30 days) visit within the authorizing provider's specialty     Patient had office visit in the last 6 months or has a visit in the next 30 days with authorizing provider or within the authorizing provider's specialty.  See \"Patient Info\" tab in inbasket, or \"Choose Columns\" in Meds & Orders section of the refill encounter.  " "             QUEtiapine (SEROQUEL) 25 MG tablet [Pharmacy Med Name: QUETIAPINE FUMARATE 25MG TABS] 30 tablet 3     Sig: TAKE ONE TABLET BY MOUTH EVERY MORNING       Antipsychotic Medications Failed - 1/18/2021  8:14 AM        Failed - Blood pressure under 140/90 in past 12 months     BP Readings from Last 3 Encounters:   01/13/21 (!) 147/70   01/12/21 122/62   01/05/21 139/70                 Failed - Medication is active on med list        Passed - Patient is 12 years of age or older        Passed - Lipid panel on file within the past 12 months     Recent Labs   Lab Test 02/19/20  1508   CHOL 200*   TRIG 75   HDL 76   *   NHDL 124               Passed - CBC on file in past 12 months     Recent Labs   Lab Test 01/13/21  1205   WBC 10.6   RBC 3.17*   HGB 8.3*   HCT 28.0*   *                 Passed - Heart Rate on file within past 12 months     Pulse Readings from Last 3 Encounters:   01/13/21 84   01/12/21 97   01/05/21 103               Passed - A1c or Glucose on file in past 12 months     Recent Labs   Lab Test 01/13/21  1205 06/16/20  0344 06/16/20  0344   *   < > 122*   A1C  --   --  Canceled, Test credited    < > = values in this interval not displayed.       Please review patients last 3 weights. If a weight gain of >10 lbs exists, you may refill the prescription once after instructing the patient to schedule an appointment within the next 30 days.    Wt Readings from Last 3 Encounters:   01/13/21 80.7 kg (177 lb 14.6 oz)   01/12/21 80.3 kg (177 lb)   01/11/21 82.1 kg (181 lb)             Passed - Recent (6 mo) or future (30 days) visit within the authorizing provider's specialty     Patient had office visit in the last 6 months or has a visit in the next 30 days with authorizing provider or within the authorizing provider's specialty.  See \"Patient Info\" tab in inbasket, or \"Choose Columns\" in Meds & Orders section of the refill encounter.                 "

## 2021-01-19 ENCOUNTER — TELEPHONE (OUTPATIENT)
Dept: FAMILY MEDICINE | Facility: CLINIC | Age: 61
End: 2021-01-19

## 2021-01-19 NOTE — TELEPHONE ENCOUNTER
Tamsulosin was prescribed by urology for procedure on 1/13 for only 7 days, so presumably he is to continue this for only 7 days then stop, and continue with the finasteride.     Chidi Valenzuela MD

## 2021-01-19 NOTE — TELEPHONE ENCOUNTER
Reason for Call:  Medication refill:    Do you use a [facility]?  Name of the pharmacy and phone number for the current request:  [facility] [phone]    Name of the medication requested: Wants to know if patient should be taking Tamsulosin ot Finestride or both?      Can we leave a detailed message on this number? YES    Phone number patient can be reached at: Other phone number:  [facility], [phone]    Best Time: Any    Call taken on 1/19/2021 at 2:08 PM by Lia Montalvo

## 2021-01-19 NOTE — TELEPHONE ENCOUNTER
Attempted to contact SOUTH Burden with home health care [facility] no answer left message to call back.

## 2021-01-21 ENCOUNTER — TELEPHONE (OUTPATIENT)
Dept: FAMILY MEDICINE | Facility: CLINIC | Age: 61
End: 2021-01-21

## 2021-01-21 RX ORDER — QUETIAPINE FUMARATE 50 MG/1
TABLET, FILM COATED ORAL
Qty: 30 TABLET | Refills: 3 | OUTPATIENT
Start: 2021-01-21

## 2021-01-21 RX ORDER — QUETIAPINE FUMARATE 25 MG/1
TABLET, FILM COATED ORAL
Qty: 30 TABLET | Refills: 3 | OUTPATIENT
Start: 2021-01-21

## 2021-01-21 NOTE — TELEPHONE ENCOUNTER
I received these forms in my inbasket again.  I do not see that this issue was addressed in his recent visits with Dr. Marrero, but will route to him to double check to see if chair lift was discussed.  If not, he will need to schedule an appointment to discuss- the form says either phone or video visit would be acceptable.      Thanks,  Chidi Valenzuela MD

## 2021-01-21 NOTE — TELEPHONE ENCOUNTER
Routing refill request to provider for review/approval because:  Blood pressure out of range    Kellie Putnam RN

## 2021-01-21 NOTE — TELEPHONE ENCOUNTER
Please advise patient to schedule an appointment to see if he qualifies for lift chair.  Virtual appointment is acceptable.    Thanks,  Chidi Valenzuela MD

## 2021-01-22 NOTE — TELEPHONE ENCOUNTER
Medication was discontinued in November because he wanted to restart trazodone.    Thanks,  Chidi Valenzuela MD

## 2021-01-24 NOTE — TELEPHONE ENCOUNTER
"Patient \"no-showed\" appt 1/22/2021.  Forms will be held in Awaiting Response tray for two weeks and then filed in forms drawer if patient does not reschedule.   "

## 2021-01-25 ENCOUNTER — OFFICE VISIT (OUTPATIENT)
Dept: AUDIOLOGY | Facility: CLINIC | Age: 61
End: 2021-01-25
Payer: COMMERCIAL

## 2021-01-25 DIAGNOSIS — H90.3 SENSORINEURAL HEARING LOSS, ASYMMETRICAL: Primary | ICD-10-CM

## 2021-01-25 PROCEDURE — 92592 PR HEARING AID CHECK, MONAURAL: CPT | Performed by: AUDIOLOGIST

## 2021-01-25 PROCEDURE — 99207 PR NO CHARGE LOS: CPT | Performed by: AUDIOLOGIST

## 2021-01-25 NOTE — PROGRESS NOTES
[facility]        SUBJECTIVE:  Abhijeet Mccauley ,60 year old male reports he has lost his right hearing aid. He states he lost it in the woods and returned to look for it but was unable to find it. He also reports his left hearing aid is not working.    OBJECTIVE:    An otoscopic examination was done and revealed clear external auditory canals bilaterally.     Faxed loss and damage form to [employer] for replacement right hearing aid. His original  warranty expires 5/26/2022.    Replaced plugged and hardened left earmold tubing and his earhook. Verified hearing aid functionality.      ASSESSMENT/PLAN:    Return to clinic for refitting of his right hearing aid. Appointment was scheduled for the patient.     [facility], #[id number]

## 2021-01-26 ENCOUNTER — TELEPHONE (OUTPATIENT)
Dept: UROLOGY | Facility: CLINIC | Age: 61
End: 2021-01-26

## 2021-01-26 ENCOUNTER — TELEPHONE (OUTPATIENT)
Dept: FAMILY MEDICINE | Facility: CLINIC | Age: 61
End: 2021-01-26

## 2021-01-26 NOTE — TELEPHONE ENCOUNTER
[facility] Call Center    Phone Message    May a detailed message be left on voicemail: no     Reason for Call: Other: Pt nurse Jenise called regarding some questions and concerns she had about a stent that was put in Pt.  Pt Nurse says that Pt was using the bathroom and accidentally pulled string out but the stent wasn't on it.  Please call Nurse Burden back at .     Action Taken: Message routed to:  [facility] Urology    Travel Screening: Not Applicable

## 2021-01-26 NOTE — TELEPHONE ENCOUNTER
Contacted Jenise DIA. Patent removed the string, but the stent was not attached. Will message Dr. Hale.  Jojo Villareal, LPN  [facility]

## 2021-01-28 NOTE — TELEPHONE ENCOUNTER
Talked to patient to schedule cysto for stent removal. Patient refused to come back to [facility] because he thinks it will be a waste of time. He say something about the clinic to talk to his pcp to see if they can  remove it for him or if someone in [place] can do it.

## 2021-01-29 NOTE — TELEPHONE ENCOUNTER
Spoke to pt nurse Jenise and let her know pt refused to come here for stent removal. Jenise will talk to patient and will call back to schedule.    **per Dr Hale clinic, ok'd to schedule cysto for stent removal on hold cysto slot.

## 2021-02-04 ENCOUNTER — TELEPHONE (OUTPATIENT)
Dept: UROLOGY | Facility: CLINIC | Age: 61
End: 2021-02-04

## 2021-02-04 NOTE — TELEPHONE ENCOUNTER
[facility] Call Center    Phone Message    May a detailed message be left on voicemail: yes     Reason for Call: Other: Abhijeet Burden's nurse, calling to let us know that Abhijeet has agreed to the stent removal. Please give Abhijeet a call back at [phone] to schedule stent removal. Jenise states it is better to give Abhijeet options as far as scheduling, rather than just scheduling the appointent and telling him when to be there. Thanks!     Action Taken: Message routed to:  [facility] Urology    Travel Screening: Not Applicable

## 2021-02-08 ENCOUNTER — PRE VISIT (OUTPATIENT)
Dept: UROLOGY | Facility: CLINIC | Age: 61
End: 2021-02-08

## 2021-02-08 NOTE — TELEPHONE ENCOUNTER
Reason for visit: post-op follow-up     Relevant information: left ureteral stent placed 1/13/21, patient removed string, scheduled for stent removal March    Records/imaging/labs/orders: available     Pt called: NA    At Rooming: telephone return

## 2021-02-09 ENCOUNTER — ANCILLARY PROCEDURE (OUTPATIENT)
Dept: GENERAL RADIOLOGY | Facility: CLINIC | Age: 61
End: 2021-02-09
Attending: INTERNAL MEDICINE
Payer: COMMERCIAL

## 2021-02-09 ENCOUNTER — OFFICE VISIT (OUTPATIENT)
Dept: FAMILY MEDICINE | Facility: CLINIC | Age: 61
End: 2021-02-09
Payer: COMMERCIAL

## 2021-02-09 ENCOUNTER — MEDICAL CORRESPONDENCE (OUTPATIENT)
Dept: HEALTH INFORMATION MANAGEMENT | Facility: CLINIC | Age: 61
End: 2021-02-09

## 2021-02-09 VITALS
HEART RATE: 102 BPM | DIASTOLIC BLOOD PRESSURE: 62 MMHG | HEIGHT: 65 IN | WEIGHT: 184.2 LBS | OXYGEN SATURATION: 98 % | TEMPERATURE: 98.9 F | RESPIRATION RATE: 20 BRPM | BODY MASS INDEX: 30.69 KG/M2 | SYSTOLIC BLOOD PRESSURE: 120 MMHG

## 2021-02-09 DIAGNOSIS — G89.29 CHRONIC PAIN OF RIGHT ANKLE: Primary | ICD-10-CM

## 2021-02-09 DIAGNOSIS — K70.30 ALCOHOLIC CIRRHOSIS, UNSPECIFIED WHETHER ASCITES PRESENT (H): ICD-10-CM

## 2021-02-09 DIAGNOSIS — Z96.642 STATUS POST TOTAL HIP REPLACEMENT, LEFT: ICD-10-CM

## 2021-02-09 DIAGNOSIS — M25.571 CHRONIC PAIN OF RIGHT ANKLE: Primary | ICD-10-CM

## 2021-02-09 DIAGNOSIS — R53.83 FATIGUE, UNSPECIFIED TYPE: ICD-10-CM

## 2021-02-09 DIAGNOSIS — D50.0 IRON DEFICIENCY ANEMIA DUE TO CHRONIC BLOOD LOSS: ICD-10-CM

## 2021-02-09 LAB
ALBUMIN SERPL-MCNC: 3.7 G/DL (ref 3.4–5)
ALP SERPL-CCNC: 545 U/L (ref 40–150)
ALT SERPL W P-5'-P-CCNC: 34 U/L (ref 0–70)
ANION GAP SERPL CALCULATED.3IONS-SCNC: 12 MMOL/L (ref 3–14)
AST SERPL W P-5'-P-CCNC: 50 U/L (ref 0–45)
BILIRUB SERPL-MCNC: 1.8 MG/DL (ref 0.2–1.3)
BUN SERPL-MCNC: 21 MG/DL (ref 7–30)
CALCIUM SERPL-MCNC: 9.4 MG/DL (ref 8.5–10.1)
CHLORIDE SERPL-SCNC: 105 MMOL/L (ref 94–109)
CO2 SERPL-SCNC: 18 MMOL/L (ref 20–32)
CREAT SERPL-MCNC: 1.82 MG/DL (ref 0.66–1.25)
CRP SERPL-MCNC: 30.9 MG/L (ref 0–8)
ERYTHROCYTE [DISTWIDTH] IN BLOOD BY AUTOMATED COUNT: 21 % (ref 10–15)
ERYTHROCYTE [SEDIMENTATION RATE] IN BLOOD BY WESTERGREN METHOD: 33 MM/H (ref 0–20)
FERRITIN SERPL-MCNC: 19 NG/ML (ref 26–388)
GFR SERPL CREATININE-BSD FRML MDRD: 39 ML/MIN/{1.73_M2}
GLUCOSE SERPL-MCNC: 78 MG/DL (ref 70–99)
HCT VFR BLD AUTO: 26.5 % (ref 40–53)
HGB BLD-MCNC: 7.8 G/DL (ref 13.3–17.7)
IRON SATN MFR SERPL: 5 % (ref 15–46)
IRON SERPL-MCNC: 21 UG/DL (ref 35–180)
MCH RBC QN AUTO: 26.1 PG (ref 26.5–33)
MCHC RBC AUTO-ENTMCNC: 29.4 G/DL (ref 31.5–36.5)
MCV RBC AUTO: 89 FL (ref 78–100)
PLATELET # BLD AUTO: 101 10E9/L (ref 150–450)
POTASSIUM SERPL-SCNC: 3.9 MMOL/L (ref 3.4–5.3)
PROT SERPL-MCNC: 7.6 G/DL (ref 6.8–8.8)
RBC # BLD AUTO: 2.99 10E12/L (ref 4.4–5.9)
SODIUM SERPL-SCNC: 135 MMOL/L (ref 133–144)
TIBC SERPL-MCNC: 424 UG/DL (ref 240–430)
TSH SERPL DL<=0.005 MIU/L-ACNC: 0.61 MU/L (ref 0.4–4)
URATE SERPL-MCNC: 8.8 MG/DL (ref 3.5–7.2)
WBC # BLD AUTO: 9.5 10E9/L (ref 4–11)

## 2021-02-09 PROCEDURE — 85652 RBC SED RATE AUTOMATED: CPT | Performed by: INTERNAL MEDICINE

## 2021-02-09 PROCEDURE — 86140 C-REACTIVE PROTEIN: CPT | Performed by: INTERNAL MEDICINE

## 2021-02-09 PROCEDURE — 99215 OFFICE O/P EST HI 40 MIN: CPT | Performed by: INTERNAL MEDICINE

## 2021-02-09 PROCEDURE — 83550 IRON BINDING TEST: CPT | Performed by: INTERNAL MEDICINE

## 2021-02-09 PROCEDURE — 83540 ASSAY OF IRON: CPT | Performed by: INTERNAL MEDICINE

## 2021-02-09 PROCEDURE — 84550 ASSAY OF BLOOD/URIC ACID: CPT | Performed by: INTERNAL MEDICINE

## 2021-02-09 PROCEDURE — 82728 ASSAY OF FERRITIN: CPT | Performed by: INTERNAL MEDICINE

## 2021-02-09 PROCEDURE — 85027 COMPLETE CBC AUTOMATED: CPT | Performed by: INTERNAL MEDICINE

## 2021-02-09 PROCEDURE — 84443 ASSAY THYROID STIM HORMONE: CPT | Performed by: INTERNAL MEDICINE

## 2021-02-09 PROCEDURE — 73610 X-RAY EXAM OF ANKLE: CPT | Mod: RT | Performed by: RADIOLOGY

## 2021-02-09 PROCEDURE — 36415 COLL VENOUS BLD VENIPUNCTURE: CPT | Performed by: INTERNAL MEDICINE

## 2021-02-09 PROCEDURE — 80053 COMPREHEN METABOLIC PANEL: CPT | Performed by: INTERNAL MEDICINE

## 2021-02-09 RX ORDER — LORATADINE 10 MG/1
10 TABLET ORAL DAILY PRN
COMMUNITY
End: 2021-03-12

## 2021-02-09 ASSESSMENT — MIFFLIN-ST. JEOR: SCORE: 1562.41

## 2021-02-09 ASSESSMENT — PAIN SCALES - GENERAL: PAINLEVEL: WORST PAIN (10)

## 2021-02-09 NOTE — PROGRESS NOTES
Assessment & Plan     Chronic pain of right ankle    Abhijeet presents with 2.5 months of R ankle pain- x-ray shows evidence of healing nondisplaced fracture of the distal tibia.  He was concerned about gout, but did not appear consistent with gout on exam, uric acid in process.  ESR somewhat elevated.  He was provided with a boot in clinic, he declined crutches.  Recommend follow-up with podiatry, referral placed.      - Uric acid  - XR Ankle Right G/E 3 Views  - ESR: Erythrocyte sedimentation rate  - CRP, inflammation  - Orthopedic & Spine  Referral; Future      Fatigue, unspecified type    Likely due to ongoing anemia, see below.  CMP and TSH in process.     - CBC with platelets  - Comprehensive metabolic panel  - TSH with free T4 reflex    Iron deficiency anemia due to chronic blood loss    Hgb is 7.8 today, down slightly from last month when it was 8.3.  He has not noticed any gross GI bleeding.  Recommend checking stool hemoccult to see if he should follow-up with GI again.  Iron levels in process.  Anemia likely multifactorial (CKD, chronic disease, GI bleed, daily alcohol use).  Recheck Hgb in a couple of weeks.     - Occult blood stool 1-3 spec; Future  - Iron and iron binding capacity  - Ferritin    Status post total hip replacement, left    New referral placed for pool therapy at the [facility] and new handicap parking permit application provided.  Unfortunately, I do not believe he will qualify for a lift chair since he is able to stand up from a chair.      - PHYSICAL THERAPY REFERRAL; Future      47 minutes spent on the date of the encounter doing chart review, history and exam, documentation and further activities as noted above         Chidi Valenzuela MD  [facility]    Singh Jha is a 60 year old who presents for the following health issues     HPI     Chief Complaint   Patient presents with     Musculoskeletal Problem     right ankle pain     Fatigue     Started since  the extreme cold.       LAB REQUEST     requesting labs for gout and fatigue         Musculoskeletal problem/pain  Onset/Duration: 2 1/2 months  Description  Location: ankle - right, initially worse on the medial side and now worse on the lateral side  Joint Swelling: YES  Redness: had some bruising at the start  Pain: YES  Warmth: some  He is having some left ankle pain from compensating  Intensity:  10/10  Progression of Symptoms:  worsening  Accompanying signs and symptoms:   Fevers: no  Numbness/tingling/weakness: YES  History  Trauma to the area: none known- he wonders if he may have hit it on something  Recent illness:  no  Previous similar problem: no  Previous evaluation:  no  Precipitating or alleviating factors:  Aggravating factors include: walking, touching it  Therapies tried and outcome: acetaminophen, does not help     He was seen in May 2020 for R ankle and foot pain without acute finding on x-ray but some chronic findings as noted below.  Pain was thought due to sprain and supportive care was advised    May 2020 x-ray:  FINDINGS: Plantar calcaneal spurring. Marked first MTP osteoarthritis.  There is a small chronic appearing fracture at the inferior aspect of  the lateral malleolus. There could also be an old posterior malleolar  fracture.    He feels more SOB and fatigued with the cold weather.  Has to sit and rest.  Hasn't noticed any blood in the stools.  No coughing or wheezing.  No chest pain.  Smoking less than 5 cigs per day, just takes a few puffs and then puts it out.  Still drinking 5 beers per day and is smoking marijuana.      He says his PCA threw away some of paper work and he needs a new form for a handicap parking permit and a form for pool therapy at the [facility].  He can walk about 50 feet before having to rest, he is using a walker.       He also would like to get a lift chair.  He is able to walk when standing.  Has difficulty getting up from a chair due to history of arthritis and  "generalized weakness, however, he is able to stand up from a chair, so I advised him he likely will not qualify.  He would benefit from additional physical therapy to increase strength.        Review of Systems   Constitutional, MSK systems are negative, except as otherwise noted.      Objective    /62 (BP Location: Left arm, Patient Position: Chair, Cuff Size: Adult Regular)   Pulse 102   Temp 98.9  F (37.2  C) (Tympanic)   Resp 20   Ht 1.651 m (5' 5\")   Wt 83.6 kg (184 lb 3.2 oz)   SpO2 98%   BMI 30.65 kg/m    Body mass index is 30.65 kg/m .  Physical Exam   GENERAL: alert and no distress  MS: bilateral R ankle pain to palpation, pain to palpation of the 1st metatarsal as well, no swelling, redness, or warmth    Results for orders placed or performed in visit on 02/09/21 (from the past 24 hour(s))   CBC with platelets   Result Value Ref Range    WBC 9.5 4.0 - 11.0 10e9/L    RBC Count 2.99 (L) 4.4 - 5.9 10e12/L    Hemoglobin 7.8 (LL) 13.3 - 17.7 g/dL    Hematocrit 26.5 (L) 40.0 - 53.0 %    MCV 89 78 - 100 fl    MCH 26.1 (L) 26.5 - 33.0 pg    MCHC 29.4 (L) 31.5 - 36.5 g/dL    RDW 21.0 (H) 10.0 - 15.0 %    Platelet Count 101 (L) 150 - 450 10e9/L   ESR: Erythrocyte sedimentation rate   Result Value Ref Range    Sed Rate 33 (H) 0 - 20 mm/h   XR Ankle Right G/E 3 Views    Narrative    Examination:  XR ANKLE RT G/E 3 VW    Date:  2/9/2021 4:23 PM     Clinical Information: Chronic right ankle pain.    Comparison: 5/12/2020.      Impression    Impression:    1.  Transverse band of sclerosis in the distal tibial metadiaphysis,  new since 5/12/2020, compatible with a healing nondisplaced fracture  (stress fracture, posttraumatic fracture, or insufficiency fracture).    2.  Moderate ankle joint effusion.    3.  Generalized demineralization.    4.  No fracture elsewhere. Normal joint alignment and spacing.    CHER ABDALLA MD     *Note: Due to a large number of results and/or encounters for the requested " time period, some results have not been displayed. A complete set of results can be found in Results Review.

## 2021-02-10 NOTE — PROGRESS NOTES
Please let Abhijeet know that his labs show he does still have iron deficiency.  I would recommend taking one 325mg iron supplement every other day.  It is absorbed best if taken with vitamin C- either 250 mg Vitamin C tablet or a half-glass of orange juice with iron to increase its absorption.  He should turn in the stool test to check for blood.  We should recheck his hemoglobin again in a couple of weeks, and he should schedule a lab appointment for this.  Recheck iron in about 2 months.     His thyroid level is normal.  Inflammatory marker is up a bit, but improved from last time this was checked.  Uric acid level is high, which could predispose to gout, but it looks like his ankle pain is due to the fracture based on his symptoms, rather than gout. His liver tests are still high, and he should continue to work on cutting down on his alcohol intake.

## 2021-02-11 RX ORDER — FERROUS SULFATE 325(65) MG
325 TABLET ORAL EVERY OTHER DAY
Qty: 45 TABLET | Refills: 3 | Status: SHIPPED | OUTPATIENT
Start: 2021-02-11 | End: 2021-09-16

## 2021-02-16 ENCOUNTER — VIRTUAL VISIT (OUTPATIENT)
Dept: UROLOGY | Facility: CLINIC | Age: 61
End: 2021-02-16
Payer: COMMERCIAL

## 2021-02-16 ENCOUNTER — TELEPHONE (OUTPATIENT)
Dept: FAMILY MEDICINE | Facility: CLINIC | Age: 61
End: 2021-02-16

## 2021-02-16 VITALS — BODY MASS INDEX: 29.73 KG/M2 | WEIGHT: 185 LBS | HEIGHT: 66 IN

## 2021-02-16 DIAGNOSIS — N20.0 KIDNEY STONE: Primary | ICD-10-CM

## 2021-02-16 PROCEDURE — 99213 OFFICE O/P EST LOW 20 MIN: CPT | Mod: 95 | Performed by: UROLOGY

## 2021-02-16 ASSESSMENT — PAIN SCALES - GENERAL: PAINLEVEL: EXTREME PAIN (9)

## 2021-02-16 ASSESSMENT — MIFFLIN-ST. JEOR: SCORE: 1581.9

## 2021-02-16 NOTE — TELEPHONE ENCOUNTER
I do not believe it is to be continued, but this was prescribed by urology and he is seeing them today, so hopefully it will be clarified at that visit.    Chidi Valenzuela MD

## 2021-02-16 NOTE — LETTER
2/16/2021       RE: Abhijeet Mccauley  [address] [place] [id number]     Dear Colleague,    Thank you for referring your patient, Abhijeet Mccauley, to the [facility] [place] at [facility]. Please see a copy of my visit note below.    Abhijeet is a 60 year old who is being evaluated via a billable telephone visit.    Mr. Mccauley is a 60-year-old gentleman with a highly complex left renal stone treated with staged ureteroscopy.  He was supposed to undergo follow-up imaging however still has his stent in place and notes that the string came out alone without the stent.    He is generally doing well without current signs of infection.  He does have a split stream as well as some dark tea colored urine but no fevers or chills.  He is otherwise tolerating the stent well.    His stone analysis is reviewed and was a mixture of calcium phosphate and calcium oxalate.  He did grow yeast on the stone.    At this time we discussed the importance of stent removal.  I explained to him that he will need cystoscopy in order to fully extract the stent.  We we will offer him a time later this week though he does believe he would have a difficult time making it to the office.  In that case we will try to expedite an appointment in the near future to remove his stent.    I do suspect that he has some residual stone in the upper pole but is too high risk a candidate for PCNL.  We will plan to update imaging after the stent is removed.    What phone number would you like to be contacted at? [phone]  How would you like to obtain your AVS? Mail a copy  Phone call duration: 8 minutes        Again, thank you for allowing me to participate in the care of your patient.      Sincerely,    Adrian Hale MD

## 2021-02-16 NOTE — TELEPHONE ENCOUNTER
Routed to urology to advise. If not needed please close encounter. No need to route back to PCP office. Thank you.

## 2021-02-16 NOTE — NURSING NOTE
"Chief Complaint   Patient presents with     RECHECK     darker than normal urine, hasn't returned to \"normal\" color since stent was placed     - Penny Kent,   EMT Clinic Support    "

## 2021-02-16 NOTE — TELEPHONE ENCOUNTER
Flomax refill.  Tamsulosin 0.4 mg #7 capsules was last written on 1/13/21 and was discontinued on 2/9/21.  Is this an ongoing med?  Advise.  Westley

## 2021-02-16 NOTE — TELEPHONE ENCOUNTER
Flomax not on current med list, call patient with questions.    Thanks,   Leonor Cruz  Certified Pharmacy Technician  [facility]  [phone]

## 2021-02-16 NOTE — PROGRESS NOTES
Abhijeet is a 60 year old who is being evaluated via a billable telephone visit.    Mr. Mccauley is a 60-year-old gentleman with a highly complex left renal stone treated with staged ureteroscopy.  He was supposed to undergo follow-up imaging however still has his stent in place and notes that the string came out alone without the stent.    He is generally doing well without current signs of infection.  He does have a split stream as well as some dark tea colored urine but no fevers or chills.  He is otherwise tolerating the stent well.    His stone analysis is reviewed and was a mixture of calcium phosphate and calcium oxalate.  He did grow yeast on the stone.    At this time we discussed the importance of stent removal.  I explained to him that he will need cystoscopy in order to fully extract the stent.  We we will offer him a time later this week though he does believe he would have a difficult time making it to the office.  In that case we will try to expedite an appointment in the near future to remove his stent.    I do suspect that he has some residual stone in the upper pole but is too high risk a candidate for PCNL.  We will plan to update imaging after the stent is removed.        What phone number would you like to be contacted at? [phone]  How would you like to obtain your AVS? Mail a copy  Phone call duration: 8 minutes

## 2021-02-17 ENCOUNTER — OFFICE VISIT (OUTPATIENT)
Dept: PODIATRY | Facility: CLINIC | Age: 61
End: 2021-02-17
Payer: COMMERCIAL

## 2021-02-17 VITALS
BODY MASS INDEX: 29.73 KG/M2 | SYSTOLIC BLOOD PRESSURE: 137 MMHG | WEIGHT: 185 LBS | DIASTOLIC BLOOD PRESSURE: 70 MMHG | HEART RATE: 98 BPM | HEIGHT: 66 IN

## 2021-02-17 DIAGNOSIS — G89.29 CHRONIC PAIN OF RIGHT ANKLE: ICD-10-CM

## 2021-02-17 DIAGNOSIS — M25.571 CHRONIC PAIN OF RIGHT ANKLE: ICD-10-CM

## 2021-02-17 DIAGNOSIS — M84.361A STRESS FRACTURE OF RIGHT TIBIA, INITIAL ENCOUNTER: Primary | ICD-10-CM

## 2021-02-17 PROCEDURE — 99203 OFFICE O/P NEW LOW 30 MIN: CPT | Performed by: PODIATRIST

## 2021-02-17 ASSESSMENT — MIFFLIN-ST. JEOR: SCORE: 1581.9

## 2021-02-17 NOTE — PROGRESS NOTES
PATIENT HISTORY:  Abhijeet Mccauley is a 60 year old male who presents to clinic with a chief complaint of a painful right ankle.  The patient relates that the problem has been going on for the past couple of months and is getting worse.  The patient relates trying use of a walker with little relief.    The patient was referred by Dr. Valenzuela for consultation on the right ankle.  Any previous notes and studies that pertain to the patient's condition were reviewed.    Pertinent medical, surgical and family history was reviewed in Epic chart and include alcoholism, tobacco dependence syndrome, acute inflammatory demyelinating polyneuropathy, hepatic encephalopathy, cirrhosis of the liver, sepsis with acute renal failure and septic shock, stage III chronic kidney disease    Medications:   Current Outpatient Medications:      acetaminophen (TYLENOL) 325 MG tablet, Take 2 tablets (650 mg) by mouth 3 times daily as needed for mild pain Max daily dose 2 grams. Do not order further acetaminophen., Disp: , Rfl:      acetaminophen (TYLENOL) 500 MG tablet, TAKE ONE TO TWO TABLETS BY MOUTH EVERY 6 HOURS AS NEEDED FOR MILD PAIN. DO NOT TAKE MORE THAN 3000 MG ACETAMINOPHEN TOTAL IN ONE DAY., Disp: 100 tablet, Rfl: 3     albuterol (VENTOLIN HFA) 108 (90 Base) MCG/ACT inhaler, Inhale 2 puffs into the lungs every 4 hours as needed for shortness of breath / dyspnea or wheezing, Disp: 18 g, Rfl: 11     alum & mag hydroxide-simethicone (MAALOX  ES) 400-400-40 MG/5ML SUSP suspension, Take 30 mLs by mouth every 6 hours as needed for indigestion or heartburn, Disp:  , Rfl:      atorvastatin 20 MG PO tablet, Take 1 tablet (20 mg) by mouth daily, Disp: 90 tablet, Rfl: 3     diclofenac (VOLTAREN) 1 % topical gel, PLACE 2 GRAMS ONTO THE SKIN FOUR TIMES A DAY AS NEEDED FOR MODERATE PAIN, Disp: 100 g, Rfl: 11     ferrous sulfate (FEROSUL) 325 (65 Fe) MG tablet, Take 1 tablet (325 mg) by mouth every other day, Disp: 45 tablet, Rfl: 3      finasteride (PROSCAR) 5 MG tablet, Take 1 tablet (5 mg) by mouth daily, Disp: 90 tablet, Rfl: 3     fluticasone-salmeterol (ADVAIR) 500-50 MCG/DOSE inhaler, Inhale 1 puff into the lungs 2 times daily, Disp: , Rfl:      folic acid (FOLVITE) 1 MG tablet, Take 1 tablet (1 mg) by mouth daily, Disp: 90 tablet, Rfl: 3     furosemide (LASIX) 20 MG tablet, Take 1 tablet (20 mg) by mouth daily, Disp: 90 tablet, Rfl: 3     gabapentin (NEURONTIN) 300 MG capsule, Take 1 capsule (300 mg) by mouth 2 times daily, Disp: 60 capsule, Rfl: 3     lactulose 20 GM/30ML SOLN, Take 30 mLs by mouth 2 times daily Adjust frequency so patient is having 2-3 soft BM daily., Disp: 946 mL, Rfl: 0     Lidocaine (LIDOCARE) 4 % Patch, Place 1 patch onto the skin 2 times daily, Disp: , Rfl:      loratadine (CLARITIN) 10 MG tablet, Take 10 mg by mouth daily as needed for allergies, Disp: , Rfl:      melatonin 3 MG tablet, Take 1 tablet (3 mg) by mouth At Bedtime, Disp: 90 tablet, Rfl: 3     mineral oil-white petrolatum (EUCERIN) CREA cream, Apply topically to back at bedtime for xerosis, Disp: , Rfl:      montelukast (SINGULAIR) 10 MG tablet, TAKE ONE TABLET BY MOUTH AT BEDTIME, Disp: 90 tablet, Rfl: 0     nystatin (MYCOSTATIN) 532863 UNIT/GM external powder, Apply to groin topically twice daily, Disp: , Rfl:      ondansetron (ZOFRAN) 4 MG tablet, Take 4 mg by mouth every 12 hours as needed for nausea or vomiting, Disp: , Rfl:      order for DME, Equipment being ordered: 4 wheel walker, Disp: 1 Units, Rfl: 0     order for DME, Equipment being ordered: 2 pairs thigh high compression stockings, strength per patient preference, Disp: 2 Units, Rfl: 1     order for DME, Equipment being ordered: Compression Stockings TWO (2) Pairs, 15-20 mm Hg., Disp: 2 Package, Rfl: prn     order for DME, Equipment being ordered: bed pull up bar, Disp: 1 Units, Rfl: 0     order for DME, Equipment being ordered: shower chair, Disp: 1 Device, Rfl: 0     pantoprazole  "(PROTONIX) 40 MG EC tablet, Take 1 tablet (40 mg) by mouth 2 times daily, Disp: 180 tablet, Rfl: 0     senna-docusate (SENOKOT-S/PERICOLACE) 8.6-50 MG tablet, Take 1-2 tablets by mouth daily as needed for constipation, Disp: , Rfl:      spironolactone (ALDACTONE) 25 MG tablet, Take 1 tablet (25 mg) by mouth daily, Disp: 90 tablet, Rfl: 3     traZODone (DESYREL) 50 MG tablet, Take 1 tablet (50 mg) by mouth At Bedtime, Disp: 90 tablet, Rfl: 3     XIFAXAN 550 MG TABS tablet, TAKE 1 TABLET (550 MG) BY MOUTH 2 TIMES DAILY, Disp: 180 tablet, Rfl: 3     polyethylene glycol (MIRALAX) 17 g packet, Take 17 g by mouth 2 times daily as needed for constipation (Patient not taking: Reported on 1/5/2021), Disp: , Rfl:      vitamin B1 (THIAMINE) 100 MG tablet, Take 1 tablet (100 mg) by mouth daily (Patient not taking: Reported on 1/5/2021), Disp:  , Rfl:      Allergies:    Allergies   Allergen Reactions     Blood Transfusion Related (Informational Only) Other (See Comments)     Patient has a history of a clinically significant antibody against RBC antigens.  A delay in compatible RBCs may occur.     Famotidine GI Disturbance     Severe abdominal cramps     Pepcid GI Disturbance     Severe abdominal cramps     Vancomycin Visual Disturbance     Hallucinations     Bactrim Ds [Sulfamethoxazole W/Trimethoprim] Itching     Cyclobenzaprine Hives     Hydrocodone-Acetaminophen Rash     Methocarbamol Dermatitis     Localized to face     Vfend Nausea and GI Disturbance     IV - cold sweats ; Iron tablet - nausea/stomach pain       Vitals: /70 (BP Location: Left arm, Patient Position: Sitting, Cuff Size: Adult Regular)   Pulse 98   Ht 1.676 m (5' 6\")   Wt 83.9 kg (185 lb)   BMI 29.86 kg/m    BMI= Body mass index is 29.86 kg/m .    LOWER EXTREMITY PHYSICAL EXAM    Dermatologic: Skin is intact to right lower extremities without significant lesions, rash or abrasion.        Vascular: DP & PT pulses are intact & regular on the right.   " CFT and skin temperature is normal to the right lower extremities.     Neurologic: Lower extremity sensation is intact to light touch.  No evidence of weakness in the right lower extremities.        Musculoskeletal: Patient is ambulatory without assistive device or brace.  No gross ankle deformity noted.  No foot or ankle joint effusion is noted.  Noted pain on palpation over the distal tibia on the right ankle.  No surrounding erythema noted.  No significant edema noted.    Diagnostics:  Radiographs were evaluated including weightbearing AP, lateral and medial oblique views of the right ankle reveals significant osseous perforation over the distal metaphysis of the tibia.  No cortical erosions or periosteal elevation.  All joint margins appear stable.  There is no apparent fracture or tumor formation noted.  There is no evidence of foreign body.  The images were reviewed with the patient explaining the findings.      ASSESSMENT / PLAN:     ICD-10-CM    1. Stress fracture of right tibia, initial encounter  M84.361A    2. Chronic pain of right ankle  M25.571 Orthopedic & Spine  Referral    G89.29        I have explained to Abhijeet about the conditions.  We discussed the underlying contributing factors of the condition as well as the treatment plan and expected length of recovery.  At this time, the patient was instructed on icing, stretching, tissue massage and support.  The patient was fitted with a pneumatic cam boot that will aid in offloading the tension forces to the soft tissues and prevent further inflammation.  To further reduce the amount of axial load to the right ankle the patient was prescribed a knee walker to be used to help remain totally nonweightbearing on the right ankle.  The patient was prescribed Os-Nash for supplemental vitamin D and C.  The patient will return in four weeks for reevaluation and repeat x-rays.    Abhijeet verbalized agreement with and understanding of the rational for the  diagnosis and treatment plan.  All questions were answered to best of my ability and the patient's satisfaction. The patient was advised to contact the clinic with any questions that may arise after the clinic visit.      Disclaimer: This note consists of symbols derived from keyboarding, dictation and/or voice recognition software. As a result, there may be errors in the script that have gone undetected. Please consider this when interpreting information found in this chart.       HAYLEE West D.P.M., [email or web address].

## 2021-02-17 NOTE — PATIENT INSTRUCTIONS
Next Steps:    1.  Remain off your right foot using the knee walker to get around.  2.  Take your Vitamin D and C to help heal the fracture  3.  Return in one month for reevaluation and repeat x-rays.        SMOKING CESSATION  What's in cigarette smoke? - Cigarette smoke contains over 4,000 chemicals. Nicotine is one of the main ingredients which is an insecticide/herbicide. It is poisonous to our nervous system, increases blood clotting risk, and decreases the body's defenses to fight off infection. Another chemical is Carbon Monoxide is an asphyxiating gas that permanently binds to blood cells and blocks the transport of oxygen. This leads to tissue death and decreases your metabolism. Tar is a chemical that coats your lungs and trachea which impairs new oxygen coming in and carbon dioxide getting out of your body.   How does smoking impact surgery? - Smoking is particularly hazardous with regards to surgery. Surgery puts stress on the body and a smoker's body is already under strain from these chemicals. Putting the two together, especially for an elective surgery, could be a recipe for disaster. Smoking before and after surgery increases your risk of heart problems, slow wound healing, delayed bone healing, blood clots, wound infection and anesthesia complications.   What are the benefits of quitting? - In 20 minutes your blood pressure will drop back down to normal. In 8 hours the carbon monoxide (a toxic gas) levels in your blood stream will drop by half, and oxygen levels will return to normal. In 48 hours your chance of having a heart attack will have decreased. All nicotine will have left your body. Your sense of taste and smell will return to a normal level. In 72 hours your bronchial tubes will relax, and your energy levels will increase. In 2 weeks your circulation will increase, and it will continue to improve for the next 10 weeks.    Recommendations for elective surgery - Ideally, patients should quit  smoking 8 weeks before and at least 2 weeks after elective surgery in order to avoid complications. Simply cutting back on the amount of cigarettes smoked per day does not offer any benefit or decrease the risk of poor wound healing, heart problems, and infection. Smokers should also start taking Vitamin C and B for two weeks before surgery and two weeks after surgery.    Ways to Stop Smokin. Nicotine patches, lozenges, or gum  2. Support Groups  3. Medications (see below)    List of Medications:  1. Varenicline Tartrate (CHANTIX)   2. Bupropion HCL (WELLBUTRIN, ZYBAN) - note: make sure Wellbutrin is for smoking cessation and not other issues   3. Nicotine Patch (NICODERM)   4. Nicotine Inhaler (NICOTROL)   5. Nicotine Gum Nicotine Polacrilex   6. Nicotine Lozenge: Nicotine Polacrilex (COMMIT)   * [facility] offers a smoking support group as well!  Please visit: [email or web address]  If you are interested in these, ask about getting a prescription or talk to your primary care doctor about what may be the best way for you to quit.

## 2021-02-17 NOTE — NURSING NOTE
"Chief Complaint   Patient presents with     Fracture     Dr. Valenzuela stated 2.5 months of R ankle pain- x-ray shows evidence of healing nondisplaced fracture of the distal tibia, XR taken 02/09       Initial /70 (BP Location: Left arm, Patient Position: Sitting, Cuff Size: Adult Regular)   Pulse 98   Ht 1.676 m (5' 6\")   Wt 83.9 kg (185 lb)   BMI 29.86 kg/m   Estimated body mass index is 29.86 kg/m  as calculated from the following:    Height as of this encounter: 1.676 m (5' 6\").    Weight as of this encounter: 83.9 kg (185 lb).  Medications and allergies reviewed.      Ying MENDES MA    "

## 2021-02-17 NOTE — LETTER
2/17/2021         RE: Abhijeet Mccauley  [address] [place] [address]        Dear Colleague,    Thank you for referring your patient, Abhijeet Mccauley, to the [facility] [place]. Please see a copy of my visit note below.    PATIENT HISTORY:  Abhijeet Mccauley is a 60 year old male who presents to clinic with a chief complaint of a painful right ankle.  The patient relates that the problem has been going on for the past couple of months and is getting worse.  The patient relates trying use of a walker with little relief.    The patient was referred by Dr. Valenzuela for consultation on the right ankle.  Any previous notes and studies that pertain to the patient's condition were reviewed.    Pertinent medical, surgical and family history was reviewed in Epic chart and include alcoholism, tobacco dependence syndrome, acute inflammatory demyelinating polyneuropathy, hepatic encephalopathy, cirrhosis of the liver, sepsis with acute renal failure and septic shock, stage III chronic kidney disease    Medications:   Current Outpatient Medications:      acetaminophen (TYLENOL) 325 MG tablet, Take 2 tablets (650 mg) by mouth 3 times daily as needed for mild pain Max daily dose 2 grams. Do not order further acetaminophen., Disp: , Rfl:      acetaminophen (TYLENOL) 500 MG tablet, TAKE ONE TO TWO TABLETS BY MOUTH EVERY 6 HOURS AS NEEDED FOR MILD PAIN. DO NOT TAKE MORE THAN 3000 MG ACETAMINOPHEN TOTAL IN ONE DAY., Disp: 100 tablet, Rfl: 3     albuterol (VENTOLIN HFA) 108 (90 Base) MCG/ACT inhaler, Inhale 2 puffs into the lungs every 4 hours as needed for shortness of breath / dyspnea or wheezing, Disp: 18 g, Rfl: 11     alum & mag hydroxide-simethicone (MAALOX  ES) 400-400-40 MG/5ML SUSP suspension, Take 30 mLs by mouth every 6 hours as needed for indigestion or heartburn, Disp:  , Rfl:      atorvastatin 20 MG PO tablet, Take 1 tablet (20 mg) by mouth daily, Disp: 90 tablet, Rfl: 3     diclofenac  (VOLTAREN) 1 % topical gel, PLACE 2 GRAMS ONTO THE SKIN FOUR TIMES A DAY AS NEEDED FOR MODERATE PAIN, Disp: 100 g, Rfl: 11     ferrous sulfate (FEROSUL) 325 (65 Fe) MG tablet, Take 1 tablet (325 mg) by mouth every other day, Disp: 45 tablet, Rfl: 3     finasteride (PROSCAR) 5 MG tablet, Take 1 tablet (5 mg) by mouth daily, Disp: 90 tablet, Rfl: 3     fluticasone-salmeterol (ADVAIR) 500-50 MCG/DOSE inhaler, Inhale 1 puff into the lungs 2 times daily, Disp: , Rfl:      folic acid (FOLVITE) 1 MG tablet, Take 1 tablet (1 mg) by mouth daily, Disp: 90 tablet, Rfl: 3     furosemide (LASIX) 20 MG tablet, Take 1 tablet (20 mg) by mouth daily, Disp: 90 tablet, Rfl: 3     gabapentin (NEURONTIN) 300 MG capsule, Take 1 capsule (300 mg) by mouth 2 times daily, Disp: 60 capsule, Rfl: 3     lactulose 20 GM/30ML SOLN, Take 30 mLs by mouth 2 times daily Adjust frequency so patient is having 2-3 soft BM daily., Disp: 946 mL, Rfl: 0     Lidocaine (LIDOCARE) 4 % Patch, Place 1 patch onto the skin 2 times daily, Disp: , Rfl:      loratadine (CLARITIN) 10 MG tablet, Take 10 mg by mouth daily as needed for allergies, Disp: , Rfl:      melatonin 3 MG tablet, Take 1 tablet (3 mg) by mouth At Bedtime, Disp: 90 tablet, Rfl: 3     mineral oil-white petrolatum (EUCERIN) CREA cream, Apply topically to back at bedtime for xerosis, Disp: , Rfl:      montelukast (SINGULAIR) 10 MG tablet, TAKE ONE TABLET BY MOUTH AT BEDTIME, Disp: 90 tablet, Rfl: 0     nystatin (MYCOSTATIN) 473380 UNIT/GM external powder, Apply to groin topically twice daily, Disp: , Rfl:      ondansetron (ZOFRAN) 4 MG tablet, Take 4 mg by mouth every 12 hours as needed for nausea or vomiting, Disp: , Rfl:      order for DME, Equipment being ordered: 4 wheel walker, Disp: 1 Units, Rfl: 0     order for DME, Equipment being ordered: 2 pairs thigh high compression stockings, strength per patient preference, Disp: 2 Units, Rfl: 1     order for DME, Equipment being ordered: Compression  "Stockings TWO (2) Pairs, 15-20 mm Hg., Disp: 2 Package, Rfl: prn     order for DME, Equipment being ordered: bed pull up bar, Disp: 1 Units, Rfl: 0     order for DME, Equipment being ordered: shower chair, Disp: 1 Device, Rfl: 0     pantoprazole (PROTONIX) 40 MG EC tablet, Take 1 tablet (40 mg) by mouth 2 times daily, Disp: 180 tablet, Rfl: 0     senna-docusate (SENOKOT-S/PERICOLACE) 8.6-50 MG tablet, Take 1-2 tablets by mouth daily as needed for constipation, Disp: , Rfl:      spironolactone (ALDACTONE) 25 MG tablet, Take 1 tablet (25 mg) by mouth daily, Disp: 90 tablet, Rfl: 3     traZODone (DESYREL) 50 MG tablet, Take 1 tablet (50 mg) by mouth At Bedtime, Disp: 90 tablet, Rfl: 3     XIFAXAN 550 MG TABS tablet, TAKE 1 TABLET (550 MG) BY MOUTH 2 TIMES DAILY, Disp: 180 tablet, Rfl: 3     polyethylene glycol (MIRALAX) 17 g packet, Take 17 g by mouth 2 times daily as needed for constipation (Patient not taking: Reported on 1/5/2021), Disp: , Rfl:      vitamin B1 (THIAMINE) 100 MG tablet, Take 1 tablet (100 mg) by mouth daily (Patient not taking: Reported on 1/5/2021), Disp:  , Rfl:      Allergies:    Allergies   Allergen Reactions     Blood Transfusion Related (Informational Only) Other (See Comments)     Patient has a history of a clinically significant antibody against RBC antigens.  A delay in compatible RBCs may occur.     Famotidine GI Disturbance     Severe abdominal cramps     Pepcid GI Disturbance     Severe abdominal cramps     Vancomycin Visual Disturbance     Hallucinations     Bactrim Ds [Sulfamethoxazole W/Trimethoprim] Itching     Cyclobenzaprine Hives     Hydrocodone-Acetaminophen Rash     Methocarbamol Dermatitis     Localized to face     Vfend Nausea and GI Disturbance     IV - cold sweats ; Iron tablet - nausea/stomach pain       Vitals: /70 (BP Location: Left arm, Patient Position: Sitting, Cuff Size: Adult Regular)   Pulse 98   Ht 1.676 m (5' 6\")   Wt 83.9 kg (185 lb)   BMI 29.86 kg/m  "   BMI= Body mass index is 29.86 kg/m .    LOWER EXTREMITY PHYSICAL EXAM    Dermatologic: Skin is intact to right lower extremities without significant lesions, rash or abrasion.        Vascular: DP & PT pulses are intact & regular on the right.   CFT and skin temperature is normal to the right lower extremities.     Neurologic: Lower extremity sensation is intact to light touch.  No evidence of weakness in the right lower extremities.        Musculoskeletal: Patient is ambulatory without assistive device or brace.  No gross ankle deformity noted.  No foot or ankle joint effusion is noted.  Noted pain on palpation over the distal tibia on the right ankle.  No surrounding erythema noted.  No significant edema noted.    Diagnostics:  Radiographs were evaluated including weightbearing AP, lateral and medial oblique views of the right ankle reveals significant osseous perforation over the distal metaphysis of the tibia.  No cortical erosions or periosteal elevation.  All joint margins appear stable.  There is no apparent fracture or tumor formation noted.  There is no evidence of foreign body.  The images were reviewed with the patient explaining the findings.      ASSESSMENT / PLAN:     ICD-10-CM    1. Stress fracture of right tibia, initial encounter  M84.361A    2. Chronic pain of right ankle  M25.571 Orthopedic & Spine  Referral    G89.29        I have explained to Abhijeet about the conditions.  We discussed the underlying contributing factors of the condition as well as the treatment plan and expected length of recovery.  At this time, the patient was instructed on icing, stretching, tissue massage and support.  The patient was fitted with a pneumatic cam boot that will aid in offloading the tension forces to the soft tissues and prevent further inflammation.  To further reduce the amount of axial load to the right ankle the patient was prescribed a knee walker to be used to help remain totally  nonweightbearing on the right ankle.  The patient was prescribed Os-Nash for supplemental vitamin D and C.  The patient will return in four weeks for reevaluation and repeat x-rays.    Abhijeet verbalized agreement with and understanding of the rational for the diagnosis and treatment plan.  All questions were answered to best of my ability and the patient's satisfaction. The patient was advised to contact the clinic with any questions that may arise after the clinic visit.      Disclaimer: This note consists of symbols derived from keyboarding, dictation and/or voice recognition software. As a result, there may be errors in the script that have gone undetected. Please consider this when interpreting information found in this chart.       VAUGHN Zapata.P.M., F.A.C.F.A.S.        Again, thank you for allowing me to participate in the care of your patient.        Sincerely,        Kristopher West DPM

## 2021-02-18 RX ORDER — MELOXICAM 7.5 MG/1
7.5 TABLET ORAL DAILY
Qty: 30 TABLET | Refills: 1 | Status: ON HOLD | OUTPATIENT
Start: 2021-02-18 | End: 2021-04-30

## 2021-02-18 RX ORDER — METHYLPREDNISOLONE 4 MG
4 TABLET, DOSE PACK ORAL SEE ADMIN INSTRUCTIONS
Qty: 21 TABLET | Refills: 0 | Status: SHIPPED | OUTPATIENT
Start: 2021-02-18 | End: 2021-04-15

## 2021-02-19 ENCOUNTER — MEDICAL CORRESPONDENCE (OUTPATIENT)
Dept: HEALTH INFORMATION MANAGEMENT | Facility: CLINIC | Age: 61
End: 2021-02-19

## 2021-02-20 NOTE — TELEPHONE ENCOUNTER
Is patient to be continuing Flomax? He was requesting a refill. Please let us know if this is approved or denied. Thank you

## 2021-02-22 DIAGNOSIS — N40.1 BENIGN LOCALIZED PROSTATIC HYPERPLASIA WITH LOWER URINARY TRACT SYMPTOMS (LUTS): Primary | ICD-10-CM

## 2021-02-22 RX ORDER — TAMSULOSIN HYDROCHLORIDE 0.4 MG/1
0.4 CAPSULE ORAL DAILY
Qty: 30 CAPSULE | Refills: 11 | Status: SHIPPED | OUTPATIENT
Start: 2021-02-22

## 2021-02-23 DIAGNOSIS — L71.9 ROSACEA: ICD-10-CM

## 2021-02-23 NOTE — TELEPHONE ENCOUNTER
Requested Prescriptions   Pending Prescriptions Disp Refills     desonide (DESOWEN) 0.05 % external lotion [Pharmacy Med Name: DESONIDE 0.05% LOTN]  1     Sig: APPLY TOPICALLY AS NEEDED       There is no refill protocol information for this order

## 2021-02-26 ENCOUNTER — TELEPHONE (OUTPATIENT)
Dept: FAMILY MEDICINE | Facility: CLINIC | Age: 61
End: 2021-02-26

## 2021-02-26 RX ORDER — DESONIDE 0.5 MG/ML
LOTION TOPICAL PRN
Refills: 1 | OUTPATIENT
Start: 2021-02-26

## 2021-03-01 ENCOUNTER — PRE VISIT (OUTPATIENT)
Dept: UROLOGY | Facility: CLINIC | Age: 61
End: 2021-03-01

## 2021-03-01 NOTE — TELEPHONE ENCOUNTER
Reason for visit: cystoscopy     Relevant information: LEFT stent removal    Records/imaging/labs/orders: available     Pt called: KAIDEN    At Rooming: BELLE

## 2021-03-03 ENCOUNTER — TELEPHONE (OUTPATIENT)
Dept: FAMILY MEDICINE | Facility: CLINIC | Age: 61
End: 2021-03-03

## 2021-03-03 NOTE — TELEPHONE ENCOUNTER
Prior Authorization Approval    Authorization Effective Date: 2/1/2021  Authorization Expiration Date: 3/3/2022  Medication: XIFAXAN 550 MG TABS tablet-APPROVED  Approved Dose/Quantity:   Reference #:     Insurance Company: [employer] - Phone [phone] Fax [phone]  Expected CoPay:       CoPay Card Available:      Foundation Assistance Needed:    Which Pharmacy is filling the prescription (Not needed for infusion/clinic administered): [facility] [place], [place] - [address] [place] [address]  Pharmacy Notified: Yes  Patient Notified: No    Pharmacy has been updated with new PA dates.

## 2021-03-03 NOTE — TELEPHONE ENCOUNTER
Central Prior Authorization Team   Phone: [phone]      PA Initiation    Medication: XIFAXAN 550 MG TABS tablet  Insurance Company: [employer] - Phone [phone] Fax [phone]  Pharmacy Filling the Rx: [facility] [place], [place] - [address] [facility]  Filling Pharmacy Phone: [phone]  Filling Pharmacy Fax:    Start Date: 3/3/2021

## 2021-03-03 NOTE — TELEPHONE ENCOUNTER
Prior Authorization Retail Medication Request    Medication/Dose: XIFAXAN 550 MG TABS tablet  ICD code (if different than what is on RX):  Alcohol dependence with alcohol-induced anxiety disorder (H) [F10.280]   Previously Tried and Failed:    Rationale:      Insurance Name:  [employer]  Insurance ID:  [id number]    Pharmacy Information (if different than what is on RX)  Name: [facility] [place], [place] - [address] [facility]  Phone: [phone]

## 2021-03-05 ENCOUNTER — TELEPHONE (OUTPATIENT)
Dept: FAMILY MEDICINE | Facility: CLINIC | Age: 61
End: 2021-03-05

## 2021-03-08 NOTE — TELEPHONE ENCOUNTER
Prior Authorization Not Needed per Insurance    Medication: diclofenac (VOLTAREN) 1 % topical gel  Insurance Company: [employer] - Phone [phone] Fax [phone]  Expected CoPay:      Pharmacy Filling the Rx: [facility] - [place], [place], [place] - [address] [facility]  Pharmacy Notified: Yes  Patient Notified: Yes

## 2021-03-09 ENCOUNTER — OFFICE VISIT (OUTPATIENT)
Dept: UROLOGY | Facility: CLINIC | Age: 61
End: 2021-03-09
Payer: COMMERCIAL

## 2021-03-09 VITALS
WEIGHT: 180 LBS | BODY MASS INDEX: 29.99 KG/M2 | HEIGHT: 65 IN | HEART RATE: 96 BPM | DIASTOLIC BLOOD PRESSURE: 74 MMHG | SYSTOLIC BLOOD PRESSURE: 121 MMHG

## 2021-03-09 DIAGNOSIS — N20.0 KIDNEY STONE: Primary | ICD-10-CM

## 2021-03-09 LAB
ALBUMIN UR-MCNC: NEGATIVE MG/DL
APPEARANCE UR: ABNORMAL
BACTERIA #/AREA URNS HPF: ABNORMAL /HPF
BILIRUB UR QL STRIP: NEGATIVE
COLOR UR AUTO: YELLOW
GLUCOSE UR STRIP-MCNC: NEGATIVE MG/DL
HGB UR QL STRIP: ABNORMAL
KETONES UR STRIP-MCNC: NEGATIVE MG/DL
LEUKOCYTE ESTERASE UR QL STRIP: ABNORMAL
NITRATE UR QL: NEGATIVE
PH UR STRIP: 6 PH (ref 5–7)
RBC #/AREA URNS AUTO: >182 /HPF (ref 0–2)
SOURCE: ABNORMAL
SP GR UR STRIP: 1.01 (ref 1–1.03)
SQUAMOUS #/AREA URNS AUTO: <1 /HPF (ref 0–1)
UROBILINOGEN UR STRIP-MCNC: 0 MG/DL (ref 0–2)
WBC #/AREA URNS AUTO: >182 /HPF (ref 0–5)
WBC CLUMPS #/AREA URNS HPF: PRESENT /HPF

## 2021-03-09 PROCEDURE — 87086 URINE CULTURE/COLONY COUNT: CPT | Mod: 90 | Performed by: PATHOLOGY

## 2021-03-09 PROCEDURE — 81001 URINALYSIS AUTO W/SCOPE: CPT | Performed by: PATHOLOGY

## 2021-03-09 PROCEDURE — 87088 URINE BACTERIA CULTURE: CPT | Mod: 90 | Performed by: PATHOLOGY

## 2021-03-09 PROCEDURE — 87186 SC STD MICRODIL/AGAR DIL: CPT | Mod: 90 | Performed by: PATHOLOGY

## 2021-03-09 PROCEDURE — 99000 SPECIMEN HANDLING OFFICE-LAB: CPT | Performed by: PATHOLOGY

## 2021-03-09 PROCEDURE — 52310 CYSTOSCOPY AND TREATMENT: CPT | Performed by: UROLOGY

## 2021-03-09 RX ORDER — FLUCONAZOLE 200 MG/1
200 TABLET ORAL DAILY
Qty: 1 TABLET | Refills: 0 | Status: SHIPPED | OUTPATIENT
Start: 2021-03-09 | End: 2021-04-15

## 2021-03-09 RX ORDER — LIDOCAINE HYDROCHLORIDE 20 MG/ML
JELLY TOPICAL ONCE
Status: COMPLETED | OUTPATIENT
Start: 2021-03-09 | End: 2021-03-09

## 2021-03-09 RX ORDER — CIPROFLOXACIN 500 MG/1
500 TABLET, FILM COATED ORAL ONCE
Status: COMPLETED | OUTPATIENT
Start: 2021-03-09 | End: 2021-03-09

## 2021-03-09 RX ADMIN — LIDOCAINE HYDROCHLORIDE: 20 JELLY TOPICAL at 12:51

## 2021-03-09 RX ADMIN — CIPROFLOXACIN 500 MG: 500 TABLET, FILM COATED ORAL at 12:51

## 2021-03-09 ASSESSMENT — PAIN SCALES - GENERAL: PAINLEVEL: NO PAIN (0)

## 2021-03-09 ASSESSMENT — MIFFLIN-ST. JEOR: SCORE: 1543.35

## 2021-03-09 NOTE — NURSING NOTE
"No chief complaint on file.      Blood pressure 121/74, pulse 96, height 1.651 m (5' 5\"), weight 81.6 kg (180 lb). Body mass index is 29.95 kg/m .    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)     CKD (chronic kidney disease) stage 3, GFR 30-59 ml/min (H)     Rosacea     Mild intermittent asthma without complication     Peptic ulcer disease     Alcohol dependence (H)     Tobacco dependence syndrome     Tubular adenoma of colon     Normocytic anemia     Generalized weakness     AIDP (acute inflammatory demyelinating polyneuropathy) (H)     Primary osteoarthritis of left knee     Esophageal varices (H)     Bilateral low back pain without sciatica     Iron deficiency anemia due to chronic blood loss     Status post total hip replacement, left     Rib fractures     Sepsis with acute renal failure and septic shock, due to unspecified organism, unspecified acute renal failure type (H)     zCoordination of complex care     Illiteracy and low-level literacy     Hepatic encephalopathy (H)     Recurrent falls     Urinary tract infection     UTI (urinary tract infection)     Anemia     Incontinent of feces     Urinary incontinence     Kidney stone       Allergies   Allergen Reactions     Blood Transfusion Related (Informational Only) Other (See Comments)     Patient has a history of a clinically significant antibody against RBC antigens.  A delay in compatible RBCs may occur.     Famotidine GI Disturbance     Severe abdominal cramps     Pepcid GI Disturbance     Severe abdominal cramps     Vancomycin Visual Disturbance     Hallucinations     Bactrim Ds [Sulfamethoxazole W/Trimethoprim] Itching     Cyclobenzaprine Hives     Hydrocodone-Acetaminophen Rash     " Methocarbamol Dermatitis     Localized to face     Vfend Nausea and GI Disturbance     IV - cold sweats ; Iron tablet - nausea/stomach pain       Current Outpatient Medications   Medication Sig Dispense Refill     acetaminophen (TYLENOL) 325 MG tablet Take 2 tablets (650 mg) by mouth 3 times daily as needed for mild pain Max daily dose 2 grams. Do not order further acetaminophen.       acetaminophen (TYLENOL) 500 MG tablet TAKE ONE TO TWO TABLETS BY MOUTH EVERY 6 HOURS AS NEEDED FOR MILD PAIN. DO NOT TAKE MORE THAN 3000 MG ACETAMINOPHEN TOTAL IN ONE DAY. 100 tablet 3     albuterol (VENTOLIN HFA) 108 (90 Base) MCG/ACT inhaler Inhale 2 puffs into the lungs every 4 hours as needed for shortness of breath / dyspnea or wheezing 18 g 11     alum & mag hydroxide-simethicone (MAALOX  ES) 400-400-40 MG/5ML SUSP suspension Take 30 mLs by mouth every 6 hours as needed for indigestion or heartburn       atorvastatin 20 MG PO tablet Take 1 tablet (20 mg) by mouth daily 90 tablet 3     calcium carbonate-vitamin D (OSCAL W/D) 500-200 MG-UNIT tablet Take 1 tablet by mouth 2 times daily 60 tablet 1     diclofenac (VOLTAREN) 1 % topical gel PLACE 2 GRAMS ONTO THE SKIN FOUR TIMES A DAY AS NEEDED FOR MODERATE PAIN 100 g 11     ferrous sulfate (FEROSUL) 325 (65 Fe) MG tablet Take 1 tablet (325 mg) by mouth every other day 45 tablet 3     finasteride (PROSCAR) 5 MG tablet Take 1 tablet (5 mg) by mouth daily 90 tablet 3     fluticasone-salmeterol (ADVAIR) 500-50 MCG/DOSE inhaler Inhale 1 puff into the lungs 2 times daily       folic acid (FOLVITE) 1 MG tablet Take 1 tablet (1 mg) by mouth daily 90 tablet 3     furosemide (LASIX) 20 MG tablet Take 1 tablet (20 mg) by mouth daily 90 tablet 3     gabapentin (NEURONTIN) 300 MG capsule Take 1 capsule (300 mg) by mouth 2 times daily 60 capsule 3     lactulose 20 GM/30ML SOLN Take 30 mLs by mouth 2 times daily Adjust frequency so patient is having 2-3 soft BM daily. 946 mL 0     Lidocaine  (LIDOCARE) 4 % Patch Place 1 patch onto the skin 2 times daily       loratadine (CLARITIN) 10 MG tablet Take 10 mg by mouth daily as needed for allergies       melatonin 3 MG tablet Take 1 tablet (3 mg) by mouth At Bedtime 90 tablet 3     meloxicam (MOBIC) 7.5 MG tablet Take 1 tablet (7.5 mg) by mouth daily Take with a meal.  Stop taking if any stomach irritation is noticed. 30 tablet 1     methylPREDNISolone (MEDROL DOSEPAK) 4 MG tablet therapy pack Take 1 tablet (4 mg) by mouth See Admin Instructions Tapered dose 21 tablet 0     mineral oil-white petrolatum (EUCERIN) CREA cream Apply topically to back at bedtime for xerosis       montelukast (SINGULAIR) 10 MG tablet TAKE ONE TABLET BY MOUTH AT BEDTIME 90 tablet 0     nystatin (MYCOSTATIN) 171441 UNIT/GM external powder Apply to groin topically twice daily       ondansetron (ZOFRAN) 4 MG tablet Take 4 mg by mouth every 12 hours as needed for nausea or vomiting       order for DME Equipment being ordered: 4 wheel walker 1 Units 0     order for DME Equipment being ordered: 2 pairs thigh high compression stockings, strength per patient preference 2 Units 1     order for DME Equipment being ordered: Compression Stockings TWO (2) Pairs, 15-20 mm Hg. 2 Package prn     order for DME Equipment being ordered: bed pull up bar 1 Units 0     order for DME Equipment being ordered: shower chair 1 Device 0     pantoprazole (PROTONIX) 40 MG EC tablet Take 1 tablet (40 mg) by mouth 2 times daily 180 tablet 0     polyethylene glycol (MIRALAX) 17 g packet Take 17 g by mouth 2 times daily as needed for constipation       senna-docusate (SENOKOT-S/PERICOLACE) 8.6-50 MG tablet Take 1-2 tablets by mouth daily as needed for constipation       spironolactone (ALDACTONE) 25 MG tablet Take 1 tablet (25 mg) by mouth daily 90 tablet 3     tamsulosin (FLOMAX) 0.4 MG capsule Take 1 capsule (0.4 mg) by mouth daily 30 capsule 11     traZODone (DESYREL) 50 MG tablet Take 1 tablet (50 mg) by mouth  At Bedtime 90 tablet 3     vitamin B1 (THIAMINE) 100 MG tablet Take 1 tablet (100 mg) by mouth daily       XIFAXAN 550 MG TABS tablet TAKE 1 TABLET (550 MG) BY MOUTH 2 TIMES DAILY 180 tablet 3       Social History     Tobacco Use     Smoking status: Current Every Day Smoker     Packs/day: 0.50     Years: 30.00     Pack years: 15.00     Types: Cigarettes     Smokeless tobacco: Never Used     Tobacco comment: 5 cigarettes a day    Substance Use Topics     Alcohol use: Yes     Comment: Down to 3 beers per day.  Sometimes a cocktail also.     Drug use: Yes     Types: Marijuana       Invasive Procedure Safety Checklist:    Procedure: Cystoscopy    Action: Complete sections and checkboxes as appropriate.    Pre-procedure:  1. Patient ID Verified with 2 identifiers (Vijaya and  or MRN) : YES    2. Procedure and site verified with patient/designee (when able) : YES    3. Accurate consent documentation in medical record : YES    4. H&P (or appropriate assessment) documented in medical record : N/A  H&P must be up to 30 days prior to procedure an updated within 24 hours of                 Procedure as applicable.     5. Relevant diagnostic and radiology test results appropriately labeled and displayed as applicable : YES    6. Blood products, implants, devices, and/or special equipment available for the procedure as applicable : YES    7. Procedure site(s) marked with provider initials [Exclusions: none] : NO    8. Marking not required. Reason : Yes  Procedure does not require site marking    Time Out:     Time-Out performed immediately prior to starting procedure, including verbal and active participation of all team members addressing: YES    1. Correct patient identity.  2. Confirmed that the correct side and site are marked.  3. An accurate procedure to be done.  4. Agreement on the procedure to be done.  5. Correct patient position.  6. Relevant images and results are properly labeled and appropriately displayed.  7.  The need to administer antibiotics or fluids for irrigation purposes during the procedure as applicable.  8. Safety precautions based on patient history or medication use.    During Procedure: Verification of correct person, site, and procedure occurs any time the responsibility for care of the patient is transferred to another member of the care team.    The following medication was given:     MEDICATION:  Lidocaine without epinephrine 2% jelly  ROUTE: urethral   SITE: urethral   DOSE: 10 mL  LOT #: [id number]  : [employer]  EXPIRATION DATE: 9-22  NDC#: [address]-4   Was there drug waste? No    Prior to med admin, verified patient identity using patient's name and date of birth.  Due to med administration, patient instructed to remain in clinic for 15 minutes  afterwards, and to report any adverse reaction to me immediately.    Drug Amount Wasted:  None.  Vial/Syringe: Syringe    The following medication was given:     MEDICATION:  Ciprofloxacin  ROUTE: PO  SITE: mouth  DOSE: 500mg  LOT #: [id number]  : [employer]  EXPIRATION DATE: 08/21  NDC#: [id number]   Was there drug waste? No    Prior to administration, verified patient identity using patient's name and date of birth.    Drug Amount Wasted:  None.        NICOLÁS NAYAK, EMT  3/9/2021  12:42 PM

## 2021-03-09 NOTE — PATIENT INSTRUCTIONS
Please  your prescription at the pharmacy on the first floor today, and follow up with a CT, and a telephone visit with Dr. Hale in 3 months.     It was a pleasure meeting with you today.  Thank you for allowing me and my team the privilege of caring for you today.  YOU are the reason we are here, and I truly hope we provided you with the excellent service you deserve.  Please let us know if there is anything else we can do for you so that we can be sure you are leaving completely satisfied with your care experience.        - Penny Kent,   EMT Clinic Support

## 2021-03-09 NOTE — LETTER
3/9/2021       RE: Abhijeet Mccauley  [address] [place] [address]     Dear Colleague,    Thank you for referring your patient, Abhijeet Mccauley, to the [facility] [place] [facility]. Please see a copy of my visit note below.    CYSTOSCOPY PROCEDURE NOTE    Reason for cystoscopy: Stent Removal    Brief History: 61 yo M with large, complex, infected, obstructing upper pole left renal stone s/p staged ureteroscopy. Here for stent removal after string became lost in urethra.     CYSTOSCOPY  After obtaining informed consent, the patient was prepped and draped in the standard sterile fashion.  The 15 Greenlandic flexible cystoscope was inserted through the urethral meatus.  The left ureteral stent was identified, grasped and removed, the string was still tied to it.    The patient tolerated the procedure well without complication.        Assessment/Plan: 61 yo M with hx of left renal stone  -CT in 6 weeks with planned follow-up afterwards to work on stone prevention.  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  I personally performed all listed procedures.

## 2021-03-10 NOTE — PROGRESS NOTES
CYSTOSCOPY PROCEDURE NOTE    Reason for cystoscopy: Stent Removal    Brief History: 59 yo M with large, complex, infected, obstructing upper pole left renal stone s/p staged ureteroscopy. Here for stent removal after string became lost in urethra.     CYSTOSCOPY  After obtaining informed consent, the patient was prepped and draped in the standard sterile fashion.  The 15 English flexible cystoscope was inserted through the urethral meatus.  The left ureteral stent was identified, grasped and removed, the string was still tied to it.    The patient tolerated the procedure well without complication.        Assessment/Plan: 59 yo M with hx of left renal stone  -CT in 6 weeks with planned follow-up afterwards to work on stone prevention.  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  I personally performed all listed procedures.

## 2021-03-11 DIAGNOSIS — N39.0 URINARY TRACT INFECTION: Primary | ICD-10-CM

## 2021-03-11 LAB
BACTERIA SPEC CULT: ABNORMAL
BACTERIA SPEC CULT: ABNORMAL
Lab: ABNORMAL
SPECIMEN SOURCE: ABNORMAL

## 2021-03-12 ENCOUNTER — PATIENT OUTREACH (OUTPATIENT)
Dept: UROLOGY | Facility: CLINIC | Age: 61
End: 2021-03-12

## 2021-03-12 ENCOUNTER — TELEPHONE (OUTPATIENT)
Dept: UROLOGY | Facility: CLINIC | Age: 61
End: 2021-03-12

## 2021-03-12 ENCOUNTER — TELEPHONE (OUTPATIENT)
Dept: FAMILY MEDICINE | Facility: CLINIC | Age: 61
End: 2021-03-12

## 2021-03-12 DIAGNOSIS — I25.10 ASCVD (ARTERIOSCLEROTIC CARDIOVASCULAR DISEASE): ICD-10-CM

## 2021-03-12 DIAGNOSIS — J30.1 SEASONAL ALLERGIC RHINITIS DUE TO POLLEN: Primary | ICD-10-CM

## 2021-03-12 RX ORDER — ATORVASTATIN CALCIUM 20 MG/1
TABLET, FILM COATED ORAL
Qty: 90 TABLET | Refills: 0 | Status: SHIPPED | OUTPATIENT
Start: 2021-03-12 | End: 2021-07-29

## 2021-03-12 RX ORDER — LORATADINE 10 MG/1
TABLET ORAL
Qty: 90 TABLET | Refills: 3 | Status: SHIPPED | OUTPATIENT
Start: 2021-03-12

## 2021-03-12 RX ORDER — CIPROFLOXACIN 500 MG/1
500 TABLET, FILM COATED ORAL 2 TIMES DAILY
Qty: 14 TABLET | Refills: 0 | Status: SHIPPED | OUTPATIENT
Start: 2021-03-12 | End: 2021-03-19

## 2021-03-12 NOTE — TELEPHONE ENCOUNTER
Writer called patient to inform him of UTI and the need for antibiotics to be started today.  perscription to be sent to [facility] pharmacy in [facility].  Patient gave verbal understanding of plan. prescription sent via escribe.  Do Brunson RN

## 2021-03-12 NOTE — TELEPHONE ENCOUNTER
----- Message from Adrian Hale MD sent at 3/11/2021  9:23 AM CST -----  Hi - Can we please start Abhijeet on Amoxicillin 500 BID x 7 days, thanks

## 2021-03-12 NOTE — TELEPHONE ENCOUNTER
Routing refill request for loratadine to provider for review/approval because:  Medication is reported/historical    Routing refill request for statin to provider for review/approval because:  Labs not current:  Lipids      Lab Results   Component Value Date    CHOL 200 02/19/2020     Lab Results   Component Value Date    HDL 76 02/19/2020     Lab Results   Component Value Date     02/19/2020     Lab Results   Component Value Date    TRIG 75 02/19/2020     Lab Results   Component Value Date    CHOLHDLRATIO 7.4 09/01/2009       Angelina HDZ RN, BSN

## 2021-03-19 ENCOUNTER — TELEPHONE (OUTPATIENT)
Dept: FAMILY MEDICINE | Facility: CLINIC | Age: 61
End: 2021-03-19

## 2021-03-23 ENCOUNTER — TELEPHONE (OUTPATIENT)
Dept: FAMILY MEDICINE | Facility: CLINIC | Age: 61
End: 2021-03-23

## 2021-03-23 DIAGNOSIS — M79.641 PAIN IN BOTH HANDS: ICD-10-CM

## 2021-03-23 DIAGNOSIS — M79.642 PAIN IN BOTH HANDS: ICD-10-CM

## 2021-03-23 NOTE — TELEPHONE ENCOUNTER
Reason for Call:  Other call back    Detailed comments: Patient wants a new PCP because he wants to go to [place] cause it is closer. He was told by [place] to contact her and ask who she would recommend to see as a new PCP.    Phone Number Patient can be reached at: Home number on file [phone] (home)    Best Time: any    Can we leave a detailed message on this number? YES    Call taken on 3/23/2021 at 11:35 AM by Karen Menjivar

## 2021-03-23 NOTE — TELEPHONE ENCOUNTER
"Requested Prescriptions   Pending Prescriptions Disp Refills     ACETAMINOPHEN EXTRA STRENGTH 500 MG tablet [Pharmacy Med Name: ACETAMINOPHEN EXTRA STRENG 500 TABS] 100 tablet 3     Sig: TAKE ONE TO TWO TABLETS BY MOUTH EVERY 6 HOURS AS NEEDED FOR MILD PAIN. DO NOT TAKE MORE THAN 3000 MG ACETAMINOPHEN TOTAL IN ONE DAY.       Analgesics (Non-Narcotic Tylenol and ASA Only) Passed - 3/23/2021  3:15 PM        Passed - Recent (12 mo) or future (30 days) visit within the authorizing provider's specialty     Patient has had an office visit with the authorizing provider or a provider within the authorizing providers department within the previous 12 mos or has a future within next 30 days. See \"Patient Info\" tab in inbasket, or \"Choose Columns\" in Meds & Orders section of the refill encounter.              Passed - Patient is 7 months old or older     If patient is a peds patient of the age 7 mos -12 years, ok to refill using weight-based dosing.     If >3g daily and/or sig is not \"prn\", check for liver enzymes. If normal in the last year, ok to refill.  If not, refer to the provider.          Passed - Medication is active on med list             "

## 2021-03-23 NOTE — TELEPHONE ENCOUNTER
Physician orders signed and faxed back.  Forms placed in basket to save.  Karen GRACE on 3/23/2021 at 9:34 AM

## 2021-03-24 NOTE — TELEPHONE ENCOUNTER
I would recommend any of the MDs at [place].  Covering for your clinician.  Thank you,  Jarrett Martinez MD  [facility]

## 2021-03-25 RX ORDER — PSEUDOEPHED/ACETAMINOPH/DIPHEN 30MG-500MG
TABLET ORAL
Qty: 100 TABLET | Refills: 0 | Status: SHIPPED | OUTPATIENT
Start: 2021-03-25 | End: 2021-04-14

## 2021-03-29 ENCOUNTER — OFFICE VISIT (OUTPATIENT)
Dept: AUDIOLOGY | Facility: CLINIC | Age: 61
End: 2021-03-29
Payer: COMMERCIAL

## 2021-03-29 DIAGNOSIS — H90.3 SENSORINEURAL HEARING LOSS, ASYMMETRICAL: Primary | ICD-10-CM

## 2021-03-29 PROCEDURE — V5299 HEARING SERVICE: HCPCS | Performed by: AUDIOLOGIST

## 2021-03-29 PROCEDURE — 99207 PR NO CHARGE LOS: CPT | Performed by: AUDIOLOGIST

## 2021-03-29 NOTE — PROGRESS NOTES
[facility], [place]        SUBJECTIVE:  Abhijeet Mccauley , 60 year old male comes in to pickup his replacement right 2019 Phonak Bolero B50-R hearing aid and earmold.     OBJECTIVE:  Refit hearing aid. Reprogrammed to original settings. Verified hearing aid functionality.      Modified left earmold slightly as was hurting patient at the tip of the canal.     Reviewed with patient that his one time warranty expiration was now used on his right hearing aid.     ASSESSMENT/PLAN:    Return to clinic as needed.     Ai Min M.A. F-HEATH, #[id number]

## 2021-03-30 ENCOUNTER — OFFICE VISIT (OUTPATIENT)
Dept: FAMILY MEDICINE | Facility: CLINIC | Age: 61
End: 2021-03-30
Payer: COMMERCIAL

## 2021-03-30 VITALS
DIASTOLIC BLOOD PRESSURE: 72 MMHG | OXYGEN SATURATION: 99 % | RESPIRATION RATE: 16 BRPM | SYSTOLIC BLOOD PRESSURE: 140 MMHG | WEIGHT: 188 LBS | BODY MASS INDEX: 30.22 KG/M2 | TEMPERATURE: 97.8 F | HEART RATE: 85 BPM | HEIGHT: 66 IN

## 2021-03-30 DIAGNOSIS — R19.5 DARK STOOLS: Primary | ICD-10-CM

## 2021-03-30 DIAGNOSIS — D50.0 IRON DEFICIENCY ANEMIA DUE TO CHRONIC BLOOD LOSS: ICD-10-CM

## 2021-03-30 DIAGNOSIS — K62.9 LESION OF PERIANAL AREA: ICD-10-CM

## 2021-03-30 LAB — HGB BLD-MCNC: 8.3 G/DL (ref 13.3–17.7)

## 2021-03-30 PROCEDURE — 85018 HEMOGLOBIN: CPT | Performed by: INTERNAL MEDICINE

## 2021-03-30 PROCEDURE — 36415 COLL VENOUS BLD VENIPUNCTURE: CPT | Performed by: INTERNAL MEDICINE

## 2021-03-30 PROCEDURE — 99214 OFFICE O/P EST MOD 30 MIN: CPT | Performed by: FAMILY MEDICINE

## 2021-03-30 ASSESSMENT — MIFFLIN-ST. JEOR: SCORE: 1595.51

## 2021-03-30 NOTE — PROGRESS NOTES
Assessment & Plan     Dark stools  Patient states t is definitely very much darker than usual. No identified meds to cause darker stools.  Patient suspects GI bleed. hgb to be obtained today - PCP has order.  Reviewed previous EGD and colonoscopy - previous dx of gastritis and polyps in colon.  Repeat EGD as patient suspects he has been bleeding. The red blood he cited is more of when he wipes. See below.  Continue pantoprazole. Hold meloxicam.  Return precautions discussed and given to patient.   - GASTROENTEROLOGY ADULT REF PROCEDURE ONLY    Lesion of perianal area  Patient is unaware of fleshy papules perianally.  DDx: condylomas, perianal skin tags, other skin lesions.  Appearance of lesions are not suspect highly of neoplasm, but may need to be ruled out.   Referral to dermatology for further evaluation and treatment  - DERMATOLOGY ADULT REFERRAL    Iron deficiency anemia due to chronic blood loss  Released from standing order.  - Hemoglobin       Patient Instructions   Schedule upper endoscopy for assessment fo the dark stools.    Schedule dermatology consult for the lesions around your anal opening.    Continue taking pantoprazole.    Hold use of meloxicam for a few weeks until the test above is done.  Do not take ibuprofen or naproxen.    Acetaminophen 500 mg orally 1-2 tabs every 4-6 hrs as needed for pain.        Patient Education     Upper Gastrointestinal (GI) Bleeding (Stable)  Your upper gastrointestinal (GI) tract includes your esophagus, stomach, and upper small intestine. You have signs of bleeding from your upper GI tract. You may have vomited or coughed up blood or coffee-ground like material. Or you may have black or tarry stools. Very small amounts of GI bleeding may not be visible and can only be found by a test of the stool.  Causes of upper GI bleeding can include:    Tear in the lining of the esophagus    Enlarged veins in the esophagus or stomach, especially in someone with  cirrhosis    An ulcer in the stomach or top of the small intestine    Severe irritation of the stomach    Inflammation of the digestive tract    Abnormal growth (tumor) of the upper digestive tract  A bloody nose or mouth or dental problems may cause you to swallow blood. You may vomit this blood up. This is not true GI bleeding. Iron supplements and medicines for diarrhea and upset stomach can cause black stools. This is not GI bleeding and is not a cause for concern.  Home care  You've had an evaluation for your bleeding. You will need to continue your care at home. Depending on the cause of your bleeding, care may include the following:    You may be given medicines to help protect your GI tract, treat your problem, and promote healing. Take these as directed.    Sometimes tests such as endoscopy may also be used to stop bleeding. An endoscope is a thin flexible tube with a light and a camera on the tip that is put into your stomach through your esophagus (throat).    Don't take NSAIDs, such as aspirin, ibuprofen, or naproxen. They can irritate the stomach and cause more bleeding. If you are taking these medicines for other reasons, talk to your healthcare provider before you stop them.     If you are on blood thinners, discuss the plan with your healthcare provider.    Don't use alcohol, caffeine, or tobacco. These can delay healing and make your problem worse.  Follow-up care  Follow up with your healthcare provider, or as advised. More tests may need to be done to find the cause of your bleeding.  When to seek medical advice  Call your healthcare provider right away for any of the following:    Stomach pain starts or gets worse    Pain spreads to the neck, back, shoulder, or arm    Weakness or dizziness    Swelling of your belly    Red blood in your stool    Fever of 100.4 F (38 C) or higher, or as directed by your healthcare provider  Call 911  Call 911 if any of these occur:    Trouble breathing or  swallowing    Severe dizziness    Loss of consciousness    Vomiting blood or large amounts of blood in the stool    Black, tarry stool    Chest pain or lightheadedness  [employer] last reviewed this educational content on 3/1/2018    0561-7939 [employer]. All rights reserved. This information is not intended as a substitute for professional medical care. Always follow your healthcare professional's instructions.               Return in about 2 weeks (around 4/13/2021) for after upper endoscopy if requested to come in for follow up.    Gustavo Cheung MD  [facility]    Singh Jha is a 60 year old who presents for the following health issues:  Chief Complaint   Patient presents with     Rectal Problem     Pt here for rectal bleeding.       HPI     Rectal bleeding  Onset/Duration: 1 1/2 wks ago  Description:   Pain: YES - sometimes when he wipes  Itching: no  Patient reports very dark brown stools, much different than his usual.  Denies upper abdominal pain, emesis, nausea, early satiety, or increased GERD.  Denies recent change in meds.  Does take meloxicam.  Accompanying Signs & Symptoms:  Blood in stool: red/pink only on toilet paper when he wipes; he asked if he is wiping too heavily.  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)     CKD (chronic kidney disease) stage 3, GFR 30-59 ml/min (H)     Rosacea     Mild  intermittent asthma without complication     Peptic ulcer disease     Alcohol dependence (H)     Tobacco dependence syndrome     Tubular adenoma of colon     Normocytic anemia     Generalized weakness     AIDP (acute inflammatory demyelinating polyneuropathy) (H)     Primary osteoarthritis of left knee     Esophageal varices (H)     Bilateral low back pain without sciatica     Iron deficiency anemia due to chronic blood loss     Status post total hip replacement, left     Rib fractures     Sepsis with acute renal failure and septic shock, due to unspecified organism, unspecified acute renal failure type (H)     zCoordination of complex care     Illiteracy and low-level literacy     Hepatic encephalopathy (H)     Recurrent falls     Urinary tract infection     UTI (urinary tract infection)     Anemia     Incontinent of feces     Urinary incontinence     Kidney stone     Past Surgical History:   Procedure Laterality Date     ARTHROPLASTY HIP Left 5/29/2020    Procedure: ARTHROPLASTY, HIP, TOTAL;  Surgeon: Carlton Jones MD;  Location: [facility]     AS TOTAL KNEE ARTHROPLASTY Left      BONE MARROW BIOPSY, BONE SPECIMEN, NEEDLE/TROCAR N/A 6/12/2018    Procedure: BIOPSY BONE MARROW;  Bone Marrow Biopsy;  Surgeon: Demar Sapp MD;  [facility]     COMBINED CYSTOSCOPY, RETROGRADES, URETEROSCOPY, LASER HOLMIUM LITHOTRIPSY URETER(S), INSERT STENT Left 12/4/2020    Procedure: LEFT CYSTOURETEROSCOPY, WITH RETROGRADE PYELOGRAM, HOLMIUM LASER LITHOTRIPSY OF URETERAL CALCULUS, AND STENT INSERTION;  Surgeon: Adrian Hale MD;  [facility]     COMBINED CYSTOSCOPY, RETROGRADES, URETEROSCOPY, LASER HOLMIUM LITHOTRIPSY URETER(S), INSERT STENT Left 1/13/2021    Procedure: LEFT CYSTOURETEROSCOPY, WITH RETROGRADE PYELOGRAM, HOLMIUM LASER LITHOTRIPSY AND STENT EXCHANGE;  Surgeon: Adrian Hale MD;  [facility]: [facility]     CYSTOSCOPY, RETROGRADES, INSERT STENT URETER(S), COMBINED Left 6/13/2020     Procedure: CYSTOSCOPY, WITH RETROGRADE PYELOGRAM AND URETERAL STENT INSERTION;  Surgeon: Renita Lino MD;  [facility]     ESOPHAGOSCOPY, GASTROSCOPY, DUODENOSCOPY (EGD), COMBINED N/A 2/8/2018    Procedure: COMBINED ESOPHAGOSCOPY, GASTROSCOPY, DUODENOSCOPY (EGD), BIOPSY SINGLE OR MULTIPLE;  gastroscopy with biopsies;  Surgeon: Raz Cobb MD;  Location: [facility]     ESOPHAGOSCOPY, GASTROSCOPY, DUODENOSCOPY (EGD), COMBINED N/A 3/18/2018    Procedure: COMBINED ESOPHAGOSCOPY, GASTROSCOPY, DUODENOSCOPY (EGD);;  Surgeon: Raz Cobb MD;  Location: [facility]     ESOPHAGOSCOPY, GASTROSCOPY, DUODENOSCOPY (EGD), COMBINED N/A 2/28/2020    Procedure: ESOPHAGOGASTRODUODENOSCOPY, WITH BIOPSY;  Surgeon: Chente Mclaughlin DO;  Location: [facility]     IR NEPHROSTOMY TUBE PLACEMENT LEFT  6/13/2020     IR PARACENTESIS  6/22/2020     LACERATION REPAIR Right     Right leg     PERCUTANEOUS NEPHROSTOMY Left 6/13/2020    Procedure: CREATION, NEPHROSTOMY, PERCUTANEOUS;  Surgeon: Iris Barkley MD;  [facility]     PHACOEMULSIFICATION WITH STANDARD INTRAOCULAR LENS IMPLANT Left 7/1/2019    Procedure: cataract removal with implant.;  Surgeon: Adrian Oliveira MD;  Location: [facility]     PHACOEMULSIFICATION WITH STANDARD INTRAOCULAR LENS IMPLANT Right 7/29/2019    Procedure: Cataract removal with implant.;  Surgeon: Adrian Oliveira MD;  Location: [facility]     PICC SINGLE LUMEN PLACEMENT Left 06/25/2020    basilic, total length 50 cm       Social History     Tobacco Use     Smoking status: Current Every Day Smoker     Packs/day: 0.50     Years: 30.00     Pack years: 15.00     Types: Cigarettes     Smokeless tobacco: Never Used     Tobacco comment: 5 cigarettes a day    Substance Use Topics     Alcohol use: Not Currently     Comment: quit 3 wks ago 3/7/21     Family History   Problem Relation Age of Onset     Hypertension Mother      Brain Tumor Mother      Coronary Artery Disease Father         MI          Current Outpatient Medications   Medication Sig Dispense Refill     acetaminophen (TYLENOL) 325 MG tablet Take 2 tablets (650 mg) by mouth 3 times daily as needed for mild pain Max daily dose 2 grams. Do not order further acetaminophen.       albuterol (VENTOLIN HFA) 108 (90 Base) MCG/ACT inhaler Inhale 2 puffs into the lungs every 4 hours as needed for shortness of breath / dyspnea or wheezing 18 g 11     atorvastatin (LIPITOR) 20 MG tablet TAKE ONE TABLET BY MOUTH ONCE DAILY 90 tablet 0     calcium carbonate-vitamin D (OSCAL W/D) 500-200 MG-UNIT tablet Take 1 tablet by mouth 2 times daily 60 tablet 1     diclofenac (VOLTAREN) 1 % topical gel PLACE 2 GRAMS ONTO THE SKIN FOUR TIMES A DAY AS NEEDED FOR MODERATE PAIN 100 g 11     ferrous sulfate (FEROSUL) 325 (65 Fe) MG tablet Take 1 tablet (325 mg) by mouth every other day 45 tablet 3     finasteride (PROSCAR) 5 MG tablet Take 1 tablet (5 mg) by mouth daily 90 tablet 3     ACETAMINOPHEN EXTRA STRENGTH 500 MG tablet TAKE ONE TO TWO TABLETS BY MOUTH EVERY 6 HOURS AS NEEDED FOR MILD PAIN. DO NOT TAKE MORE THAN 3000 MG ACETAMINOPHEN TOTAL IN ONE DAY. 100 tablet 0     fluconazole (DIFLUCAN) 200 MG tablet Take 1 tablet (200 mg) by mouth daily 1 tablet 0     fluticasone-salmeterol (ADVAIR) 500-50 MCG/DOSE inhaler Inhale 1 puff into the lungs 2 times daily       folic acid (FOLVITE) 1 MG tablet Take 1 tablet (1 mg) by mouth daily 90 tablet 3     furosemide (LASIX) 20 MG tablet Take 1 tablet (20 mg) by mouth daily 90 tablet 3     gabapentin (NEURONTIN) 300 MG capsule Take 1 capsule (300 mg) by mouth 2 times daily 60 capsule 3     lactulose 20 GM/30ML SOLN Take 30 mLs by mouth 2 times daily Adjust frequency so patient is having 2-3 soft BM daily. 946 mL 0     Lidocaine (LIDOCARE) 4 % Patch Place 1 patch onto the skin 2 times daily       loratadine (CLARITIN) 10 MG tablet TAKE ONE TABLET BY MOUTH ONCE DAILY AS NEEDED FOR ALLERGIES 90 tablet 3     melatonin 3 MG tablet Take  1 tablet (3 mg) by mouth At Bedtime 90 tablet 3     meloxicam (MOBIC) 7.5 MG tablet Take 1 tablet (7.5 mg) by mouth daily Take with a meal.  Stop taking if any stomach irritation is noticed. 30 tablet 1     methylPREDNISolone (MEDROL DOSEPAK) 4 MG tablet therapy pack Take 1 tablet (4 mg) by mouth See Admin Instructions Tapered dose 21 tablet 0     mineral oil-white petrolatum (EUCERIN) CREA cream Apply topically to back at bedtime for xerosis       montelukast (SINGULAIR) 10 MG tablet TAKE ONE TABLET BY MOUTH AT BEDTIME 90 tablet 0     nystatin (MYCOSTATIN) 094794 UNIT/GM external powder Apply to groin topically twice daily       ondansetron (ZOFRAN) 4 MG tablet Take 4 mg by mouth every 12 hours as needed for nausea or vomiting       order for DME Equipment being ordered: 4 wheel walker 1 Units 0     order for DME Equipment being ordered: 2 pairs thigh high compression stockings, strength per patient preference 2 Units 1     order for DME Equipment being ordered: Compression Stockings TWO (2) Pairs, 15-20 mm Hg. 2 Package prn     order for DME Equipment being ordered: bed pull up bar 1 Units 0     order for DME Equipment being ordered: shower chair 1 Device 0     pantoprazole (PROTONIX) 40 MG EC tablet Take 1 tablet (40 mg) by mouth 2 times daily 180 tablet 0     polyethylene glycol (MIRALAX) 17 g packet Take 17 g by mouth 2 times daily as needed for constipation (Patient not taking: Reported on 3/30/2021)       senna-docusate (SENOKOT-S/PERICOLACE) 8.6-50 MG tablet Take 1-2 tablets by mouth daily as needed for constipation       spironolactone (ALDACTONE) 25 MG tablet Take 1 tablet (25 mg) by mouth daily 90 tablet 3     tamsulosin (FLOMAX) 0.4 MG capsule Take 1 capsule (0.4 mg) by mouth daily 30 capsule 11     traZODone (DESYREL) 50 MG tablet Take 1 tablet (50 mg) by mouth At Bedtime 90 tablet 3     vitamin B1 (THIAMINE) 100 MG tablet Take 1 tablet (100 mg) by mouth daily       XIFAXAN 550 MG TABS tablet TAKE 1  "TABLET (550 MG) BY MOUTH 2 TIMES DAILY 180 tablet 3     Allergies   Allergen Reactions     Blood Transfusion Related (Informational Only) Other (See Comments)     Patient has a history of a clinically significant antibody against RBC antigens.  A delay in compatible RBCs may occur.     Famotidine GI Disturbance     Severe abdominal cramps     Pepcid GI Disturbance     Severe abdominal cramps     Vancomycin Visual Disturbance     Hallucinations     Bactrim Ds [Sulfamethoxazole W/Trimethoprim] Itching     Cyclobenzaprine Hives     Hydrocodone-Acetaminophen Rash     Methocarbamol Dermatitis     Localized to face     Vfend Nausea and GI Disturbance     IV - cold sweats ; Iron tablet - nausea/stomach pain     Review of Systems   ROS:  C: NEGATIVE for fever, chills or change in weight  I: NEGATIVE for rash or pallor  E: NEGATIVE for jaundice  R: NEGATIVE for exertional symptoms  CV: NEGATIVE for palpitations or lightheadedness  GI: see above  N: NEG for dizziness  H: NEGATIVE for bleeding problems      Objective    BP (!) 140/72   Pulse 85   Temp 97.8  F (36.6  C) (Tympanic)   Resp 16   Ht 1.676 m (5' 6\")   Wt 85.3 kg (188 lb)   SpO2 99%   BMI 30.34 kg/m    Body mass index is 30.34 kg/m .  Physical Exam   GEN: alert, oriented x 3, NAD  EYES: pink conjunctivae  EXT ANAL EXAM: no visible external hemorrhoid; no active bleeding, numerous fleshy papules of varying sizes around perianal area with note of scant blood when patient wiped after rectal exam, no visible fissure  ANOSCOPY: no visible sign of active bleeding, mass or fissure  DIRECT RECTAL EXAM: good sphincter tone, intact vault, no mass to reach of exam finger, no blood per exam glove.    Results for orders placed or performed in visit on 03/30/21   Hemoglobin     Status: Abnormal   Result Value Ref Range    Hemoglobin 8.3 (L) 13.3 - 17.7 g/dL               "

## 2021-03-30 NOTE — PATIENT INSTRUCTIONS
Schedule upper endoscopy for assessment fo the dark stools.    Schedule dermatology consult for the lesions around your anal opening.    Continue taking pantoprazole.    Hold use of meloxicam for a few weeks until the test above is done.  Do not take ibuprofen or naproxen.    Acetaminophen 500 mg orally 1-2 tabs every 4-6 hrs as needed for pain.        Patient Education     Upper Gastrointestinal (GI) Bleeding (Stable)  Your upper gastrointestinal (GI) tract includes your esophagus, stomach, and upper small intestine. You have signs of bleeding from your upper GI tract. You may have vomited or coughed up blood or coffee-ground like material. Or you may have black or tarry stools. Very small amounts of GI bleeding may not be visible and can only be found by a test of the stool.  Causes of upper GI bleeding can include:    Tear in the lining of the esophagus    Enlarged veins in the esophagus or stomach, especially in someone with cirrhosis    An ulcer in the stomach or top of the small intestine    Severe irritation of the stomach    Inflammation of the digestive tract    Abnormal growth (tumor) of the upper digestive tract  A bloody nose or mouth or dental problems may cause you to swallow blood. You may vomit this blood up. This is not true GI bleeding. Iron supplements and medicines for diarrhea and upset stomach can cause black stools. This is not GI bleeding and is not a cause for concern.  Home care  You've had an evaluation for your bleeding. You will need to continue your care at home. Depending on the cause of your bleeding, care may include the following:    You may be given medicines to help protect your GI tract, treat your problem, and promote healing. Take these as directed.    Sometimes tests such as endoscopy may also be used to stop bleeding. An endoscope is a thin flexible tube with a light and a camera on the tip that is put into your stomach through your esophagus (throat).    Don't take NSAIDs,  such as aspirin, ibuprofen, or naproxen. They can irritate the stomach and cause more bleeding. If you are taking these medicines for other reasons, talk to your healthcare provider before you stop them.     If you are on blood thinners, discuss the plan with your healthcare provider.    Don't use alcohol, caffeine, or tobacco. These can delay healing and make your problem worse.  Follow-up care  Follow up with your healthcare provider, or as advised. More tests may need to be done to find the cause of your bleeding.  When to seek medical advice  Call your healthcare provider right away for any of the following:    Stomach pain starts or gets worse    Pain spreads to the neck, back, shoulder, or arm    Weakness or dizziness    Swelling of your belly    Red blood in your stool    Fever of 100.4 F (38 C) or higher, or as directed by your healthcare provider  Call 911  Call 911 if any of these occur:    Trouble breathing or swallowing    Severe dizziness    Loss of consciousness    Vomiting blood or large amounts of blood in the stool    Black, tarry stool    Chest pain or lightheadedness  [employer] last reviewed this educational content on 3/1/2018    5428-6141 [employer]. All rights reserved. This information is not intended as a substitute for professional medical care. Always follow your healthcare professional's instructions.

## 2021-03-31 ENCOUNTER — HOSPITAL ENCOUNTER (OUTPATIENT)
Facility: CLINIC | Age: 61
End: 2021-03-31
Attending: SURGERY | Admitting: SURGERY
Payer: COMMERCIAL

## 2021-04-06 ENCOUNTER — IMMUNIZATION (OUTPATIENT)
Dept: FAMILY MEDICINE | Facility: CLINIC | Age: 61
End: 2021-04-06
Payer: COMMERCIAL

## 2021-04-06 DIAGNOSIS — Z11.59 ENCOUNTER FOR SCREENING FOR OTHER VIRAL DISEASES: ICD-10-CM

## 2021-04-06 PROCEDURE — 0011A PR COVID VAC MODERNA 100 MCG/0.5 ML IM: CPT

## 2021-04-06 PROCEDURE — 91301 PR COVID VAC MODERNA 100 MCG/0.5 ML IM: CPT

## 2021-04-07 ENCOUNTER — OFFICE VISIT (OUTPATIENT)
Dept: DERMATOLOGY | Facility: CLINIC | Age: 61
End: 2021-04-07
Payer: COMMERCIAL

## 2021-04-07 VITALS — SYSTOLIC BLOOD PRESSURE: 143 MMHG | DIASTOLIC BLOOD PRESSURE: 74 MMHG | HEART RATE: 88 BPM | OXYGEN SATURATION: 98 %

## 2021-04-07 DIAGNOSIS — A63.0 GENITAL WARTS: Primary | ICD-10-CM

## 2021-04-07 DIAGNOSIS — K62.9 LESION OF PERIANAL AREA: ICD-10-CM

## 2021-04-07 DIAGNOSIS — L82.1 SEBORRHEIC KERATOSIS: ICD-10-CM

## 2021-04-07 DIAGNOSIS — L81.4 LENTIGO: ICD-10-CM

## 2021-04-07 PROCEDURE — 17110 DESTRUCTION B9 LES UP TO 14: CPT | Performed by: DERMATOLOGY

## 2021-04-07 PROCEDURE — 99243 OFF/OP CNSLTJ NEW/EST LOW 30: CPT | Mod: 25 | Performed by: DERMATOLOGY

## 2021-04-07 RX ORDER — IMIQUIMOD 12.5 MG/.25G
CREAM TOPICAL
Qty: 24 EACH | Refills: 3 | Status: SHIPPED | OUTPATIENT
Start: 2021-04-07 | End: 2021-04-26
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
## 2021-04-10 ENCOUNTER — MEDICAL CORRESPONDENCE (OUTPATIENT)
Dept: HEALTH INFORMATION MANAGEMENT | Facility: CLINIC | Age: 61
End: 2021-04-10

## 2021-04-12 DIAGNOSIS — M79.641 PAIN IN BOTH HANDS: ICD-10-CM

## 2021-04-12 DIAGNOSIS — M79.642 PAIN IN BOTH HANDS: ICD-10-CM

## 2021-04-14 RX ORDER — PSEUDOEPHED/ACETAMINOPH/DIPHEN 30MG-500MG
TABLET ORAL
Qty: 100 TABLET | Refills: 0 | Status: SHIPPED | OUTPATIENT
Start: 2021-04-14 | End: 2021-06-15

## 2021-04-15 DIAGNOSIS — J30.1 SEASONAL ALLERGIC RHINITIS DUE TO POLLEN: ICD-10-CM

## 2021-04-15 DIAGNOSIS — M84.361A STRESS FRACTURE OF RIGHT TIBIA, INITIAL ENCOUNTER: ICD-10-CM

## 2021-04-17 DIAGNOSIS — Z11.59 ENCOUNTER FOR SCREENING FOR OTHER VIRAL DISEASES: ICD-10-CM

## 2021-04-19 RX ORDER — MONTELUKAST SODIUM 10 MG/1
TABLET ORAL
Qty: 90 TABLET | Refills: 0 | Status: SHIPPED | OUTPATIENT
Start: 2021-04-19 | End: 2021-09-10

## 2021-04-19 NOTE — TELEPHONE ENCOUNTER
"Has appointment scheduled for 5/4/21    Requested Prescriptions   Pending Prescriptions Disp Refills     montelukast (SINGULAIR) 10 MG tablet [Pharmacy Med Name: MONTELUKAST SODIUM 10MG TABS] 90 tablet 0     Sig: TAKE ONE TABLET BY MOUTH AT BEDTIME       Leukotriene Inhibitors Protocol Failed - 4/15/2021  9:39 AM        Failed - Asthma control assessment score within normal limits in last 6 months     Please review ACT score.           Passed - Patient is age 12 or older     If patient is under 16, ok to refill using age based dosing.           Passed - Medication is active on med list        Passed - Recent (6 mo) or future (30 days) visit within the authorizing provider's specialty     Patient had office visit in the last 6 months or has a visit in the next 30 days with authorizing provider or within the authorizing provider's specialty.  See \"Patient Info\" tab in inbasket, or \"Choose Columns\" in Meds & Orders section of the refill encounter.                 "

## 2021-04-26 DIAGNOSIS — Z11.59 ENCOUNTER FOR SCREENING FOR OTHER VIRAL DISEASES: ICD-10-CM

## 2021-04-26 LAB
SARS-COV-2 RNA RESP QL NAA+PROBE: NORMAL
SPECIMEN SOURCE: NORMAL

## 2021-04-26 PROCEDURE — U0005 INFEC AGEN DETEC AMPLI PROBE: HCPCS | Performed by: ORTHOPAEDIC SURGERY

## 2021-04-26 PROCEDURE — U0003 INFECTIOUS AGENT DETECTION BY NUCLEIC ACID (DNA OR RNA); SEVERE ACUTE RESPIRATORY SYNDROME CORONAVIRUS 2 (SARS-COV-2) (CORONAVIRUS DISEASE [COVID-19]), AMPLIFIED PROBE TECHNIQUE, MAKING USE OF HIGH THROUGHPUT TECHNOLOGIES AS DESCRIBED BY CMS-2020-01-R: HCPCS | Performed by: ORTHOPAEDIC SURGERY

## 2021-04-27 ENCOUNTER — TELEPHONE (OUTPATIENT)
Dept: FAMILY MEDICINE | Facility: CLINIC | Age: 61
End: 2021-04-27

## 2021-04-27 ENCOUNTER — OFFICE VISIT (OUTPATIENT)
Dept: FAMILY MEDICINE | Facility: CLINIC | Age: 61
End: 2021-04-27
Payer: COMMERCIAL

## 2021-04-27 VITALS
HEART RATE: 89 BPM | BODY MASS INDEX: 29.21 KG/M2 | OXYGEN SATURATION: 99 % | SYSTOLIC BLOOD PRESSURE: 122 MMHG | RESPIRATION RATE: 14 BRPM | TEMPERATURE: 98.2 F | WEIGHT: 181 LBS | DIASTOLIC BLOOD PRESSURE: 64 MMHG

## 2021-04-27 DIAGNOSIS — D64.9 ANEMIA, UNSPECIFIED TYPE: ICD-10-CM

## 2021-04-27 DIAGNOSIS — M17.12 ARTHRITIS OF LEFT KNEE: ICD-10-CM

## 2021-04-27 DIAGNOSIS — Z01.818 PREOP GENERAL PHYSICAL EXAM: Primary | ICD-10-CM

## 2021-04-27 DIAGNOSIS — E87.6 HYPOKALEMIA: Primary | ICD-10-CM

## 2021-04-27 LAB
ALBUMIN SERPL-MCNC: 3.3 G/DL (ref 3.4–5)
ALP SERPL-CCNC: 441 U/L (ref 40–150)
ALT SERPL W P-5'-P-CCNC: 30 U/L (ref 0–70)
ANION GAP SERPL CALCULATED.3IONS-SCNC: 10 MMOL/L (ref 3–14)
AST SERPL W P-5'-P-CCNC: 45 U/L (ref 0–45)
BASOPHILS # BLD AUTO: 0.1 10E9/L (ref 0–0.2)
BASOPHILS NFR BLD AUTO: 0.9 %
BILIRUB SERPL-MCNC: 1.1 MG/DL (ref 0.2–1.3)
BUN SERPL-MCNC: 19 MG/DL (ref 7–30)
CALCIUM SERPL-MCNC: 9.1 MG/DL (ref 8.5–10.1)
CHLORIDE SERPL-SCNC: 106 MMOL/L (ref 94–109)
CO2 SERPL-SCNC: 21 MMOL/L (ref 20–32)
CREAT SERPL-MCNC: 2.04 MG/DL (ref 0.66–1.25)
DIFFERENTIAL METHOD BLD: ABNORMAL
EOSINOPHIL # BLD AUTO: 0.3 10E9/L (ref 0–0.7)
EOSINOPHIL NFR BLD AUTO: 3.7 %
ERYTHROCYTE [DISTWIDTH] IN BLOOD BY AUTOMATED COUNT: 22.8 % (ref 10–15)
GFR SERPL CREATININE-BSD FRML MDRD: 34 ML/MIN/{1.73_M2}
GLUCOSE SERPL-MCNC: 105 MG/DL (ref 70–99)
HCT VFR BLD AUTO: 32.8 % (ref 40–53)
HGB BLD-MCNC: 9.8 G/DL (ref 13.3–17.7)
IMM GRANULOCYTES # BLD: 0 10E9/L (ref 0–0.4)
IMM GRANULOCYTES NFR BLD: 0.2 %
LABORATORY COMMENT REPORT: NORMAL
LYMPHOCYTES # BLD AUTO: 1.3 10E9/L (ref 0.8–5.3)
LYMPHOCYTES NFR BLD AUTO: 16.6 %
MCH RBC QN AUTO: 27.8 PG (ref 26.5–33)
MCHC RBC AUTO-ENTMCNC: 29.9 G/DL (ref 31.5–36.5)
MCV RBC AUTO: 93 FL (ref 78–100)
MONOCYTES # BLD AUTO: 1.3 10E9/L (ref 0–1.3)
MONOCYTES NFR BLD AUTO: 16.6 %
MRSA DNA SPEC QL NAA+PROBE: NEGATIVE
NEUTROPHILS # BLD AUTO: 5 10E9/L (ref 1.6–8.3)
NEUTROPHILS NFR BLD AUTO: 62 %
NRBC # BLD AUTO: 0 10*3/UL
NRBC BLD AUTO-RTO: 0 /100
PLATELET # BLD AUTO: 129 10E9/L (ref 150–450)
POTASSIUM SERPL-SCNC: 3.3 MMOL/L (ref 3.4–5.3)
PROT SERPL-MCNC: 7.1 G/DL (ref 6.8–8.8)
RBC # BLD AUTO: 3.52 10E12/L (ref 4.4–5.9)
SARS-COV-2 RNA RESP QL NAA+PROBE: NEGATIVE
SODIUM SERPL-SCNC: 137 MMOL/L (ref 133–144)
SPECIMEN SOURCE: NORMAL
SPECIMEN SOURCE: NORMAL
WBC # BLD AUTO: 8 10E9/L (ref 4–11)

## 2021-04-27 PROCEDURE — 85025 COMPLETE CBC W/AUTO DIFF WBC: CPT | Performed by: FAMILY MEDICINE

## 2021-04-27 PROCEDURE — 87641 MR-STAPH DNA AMP PROBE: CPT | Performed by: FAMILY MEDICINE

## 2021-04-27 PROCEDURE — 99214 OFFICE O/P EST MOD 30 MIN: CPT | Performed by: FAMILY MEDICINE

## 2021-04-27 PROCEDURE — 36415 COLL VENOUS BLD VENIPUNCTURE: CPT | Performed by: FAMILY MEDICINE

## 2021-04-27 PROCEDURE — 87640 STAPH A DNA AMP PROBE: CPT | Mod: 59 | Performed by: FAMILY MEDICINE

## 2021-04-27 PROCEDURE — 80053 COMPREHEN METABOLIC PANEL: CPT | Performed by: FAMILY MEDICINE

## 2021-04-27 RX ORDER — POTASSIUM CHLORIDE 1500 MG/1
20 TABLET, EXTENDED RELEASE ORAL 2 TIMES DAILY
Qty: 6 TABLET | Refills: 0 | Status: SHIPPED | OUTPATIENT
Start: 2021-04-27 | End: 2021-04-30

## 2021-04-27 NOTE — TELEPHONE ENCOUNTER
Call placed to patient . We discussed K replacement. Prescription sent. He will not be able to  until tomorrow. He states understanding.    Jen SIDDIQI RN

## 2021-04-27 NOTE — TELEPHONE ENCOUNTER
Received call from home health assistant. We again reviewed how to take ordered K+. Both state understanding.    Jen SIDDIQI RN

## 2021-04-27 NOTE — RESULT ENCOUNTER NOTE
Please inform patient that test result showed low potassium.     Hemoglobin is much better than before.     Recommend potassium replacement since surgery is coming up.     I will fax this to the pharmacy.     Thank you.     Wilver Marrufo M.D.
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
## 2021-04-28 ENCOUNTER — ANESTHESIA EVENT (OUTPATIENT)
Dept: SURGERY | Facility: CLINIC | Age: 61
End: 2021-04-28
Payer: COMMERCIAL

## 2021-04-29 ENCOUNTER — APPOINTMENT (OUTPATIENT)
Dept: GENERAL RADIOLOGY | Facility: CLINIC | Age: 61
End: 2021-04-29
Attending: ORTHOPAEDIC SURGERY
Payer: COMMERCIAL

## 2021-04-29 ENCOUNTER — HOSPITAL ENCOUNTER (OUTPATIENT)
Facility: CLINIC | Age: 61
Discharge: HOME HEALTH | End: 2021-05-01
Attending: ORTHOPAEDIC SURGERY | Admitting: ORTHOPAEDIC SURGERY
Payer: COMMERCIAL

## 2021-04-29 ENCOUNTER — ANESTHESIA (OUTPATIENT)
Dept: SURGERY | Facility: CLINIC | Age: 61
End: 2021-04-29
Payer: COMMERCIAL

## 2021-04-29 DIAGNOSIS — Z96.652 HISTORY OF REVISION OF TOTAL REPLACEMENT OF LEFT KNEE JOINT: Primary | ICD-10-CM

## 2021-04-29 DIAGNOSIS — Z96.652 STATUS POST TOTAL LEFT KNEE REPLACEMENT: ICD-10-CM

## 2021-04-29 PROBLEM — Z96.659 STATUS POST TOTAL KNEE REPLACEMENT: Status: ACTIVE | Noted: 2021-04-29

## 2021-04-29 LAB
GLUCOSE BLDC GLUCOMTR-MCNC: 225 MG/DL (ref 70–99)
INR PPP: 1.39 (ref 0.86–1.14)

## 2021-04-29 PROCEDURE — 250N000013 HC RX MED GY IP 250 OP 250 PS 637: Performed by: NURSE ANESTHETIST, CERTIFIED REGISTERED

## 2021-04-29 PROCEDURE — 250N000009 HC RX 250: Performed by: ORTHOPAEDIC SURGERY

## 2021-04-29 PROCEDURE — 82962 GLUCOSE BLOOD TEST: CPT

## 2021-04-29 PROCEDURE — 360N000078 HC SURGERY LEVEL 5, PER MIN: Performed by: ORTHOPAEDIC SURGERY

## 2021-04-29 PROCEDURE — 250N000011 HC RX IP 250 OP 636: Performed by: ORTHOPAEDIC SURGERY

## 2021-04-29 PROCEDURE — C1776 JOINT DEVICE (IMPLANTABLE): HCPCS | Performed by: ORTHOPAEDIC SURGERY

## 2021-04-29 PROCEDURE — 250N000009 HC RX 250: Performed by: NURSE ANESTHETIST, CERTIFIED REGISTERED

## 2021-04-29 PROCEDURE — 250N000011 HC RX IP 250 OP 636

## 2021-04-29 PROCEDURE — 710N000009 HC RECOVERY PHASE 1, LEVEL 1, PER MIN: Performed by: ORTHOPAEDIC SURGERY

## 2021-04-29 PROCEDURE — 250N000009 HC RX 250

## 2021-04-29 PROCEDURE — 85610 PROTHROMBIN TIME: CPT | Performed by: NURSE ANESTHETIST, CERTIFIED REGISTERED

## 2021-04-29 PROCEDURE — 272N000001 HC OR GENERAL SUPPLY STERILE: Performed by: ORTHOPAEDIC SURGERY

## 2021-04-29 PROCEDURE — 258N000003 HC RX IP 258 OP 636: Performed by: NURSE ANESTHETIST, CERTIFIED REGISTERED

## 2021-04-29 PROCEDURE — 999N000141 HC STATISTIC PRE-PROCEDURE NURSING ASSESSMENT: Performed by: ORTHOPAEDIC SURGERY

## 2021-04-29 PROCEDURE — 258N000003 HC RX IP 258 OP 636: Performed by: ORTHOPAEDIC SURGERY

## 2021-04-29 PROCEDURE — 36415 COLL VENOUS BLD VENIPUNCTURE: CPT | Performed by: NURSE ANESTHETIST, CERTIFIED REGISTERED

## 2021-04-29 PROCEDURE — 250N000013 HC RX MED GY IP 250 OP 250 PS 637: Performed by: ORTHOPAEDIC SURGERY

## 2021-04-29 PROCEDURE — 250N000013 HC RX MED GY IP 250 OP 250 PS 637: Performed by: PHYSICIAN ASSISTANT

## 2021-04-29 PROCEDURE — 999N000065 XR KNEE PORT LEFT 1/2 VIEWS: Mod: LT

## 2021-04-29 PROCEDURE — 250N000011 HC RX IP 250 OP 636: Performed by: PHYSICIAN ASSISTANT

## 2021-04-29 PROCEDURE — 370N000017 HC ANESTHESIA TECHNICAL FEE, PER MIN: Performed by: ORTHOPAEDIC SURGERY

## 2021-04-29 PROCEDURE — 278N000051 HC OR IMPLANT GENERAL: Performed by: ORTHOPAEDIC SURGERY

## 2021-04-29 PROCEDURE — 250N000011 HC RX IP 250 OP 636: Performed by: NURSE ANESTHETIST, CERTIFIED REGISTERED

## 2021-04-29 DEVICE — BONE CEMENT RADIOPAQUE SIMPLEX HV FULL DOSE 6194-1-001: Type: IMPLANTABLE DEVICE | Site: KNEE | Status: FUNCTIONAL

## 2021-04-29 DEVICE — IMPLANTABLE DEVICE: Type: IMPLANTABLE DEVICE | Site: KNEE | Status: FUNCTIONAL

## 2021-04-29 RX ORDER — TRAZODONE HYDROCHLORIDE 50 MG/1
50 TABLET, FILM COATED ORAL
Status: DISCONTINUED | OUTPATIENT
Start: 2021-04-29 | End: 2021-05-01 | Stop reason: HOSPADM

## 2021-04-29 RX ORDER — ATORVASTATIN CALCIUM 20 MG/1
20 TABLET, FILM COATED ORAL DAILY
Status: DISCONTINUED | OUTPATIENT
Start: 2021-04-29 | End: 2021-05-01 | Stop reason: HOSPADM

## 2021-04-29 RX ORDER — GABAPENTIN 300 MG/1
300 CAPSULE ORAL ONCE
Status: COMPLETED | OUTPATIENT
Start: 2021-04-29 | End: 2021-04-29

## 2021-04-29 RX ORDER — HYDROXYZINE HYDROCHLORIDE 25 MG/1
25-50 TABLET, FILM COATED ORAL EVERY 6 HOURS PRN
Qty: 40 TABLET | Refills: 0 | Status: SHIPPED | OUTPATIENT
Start: 2021-04-29

## 2021-04-29 RX ORDER — LIDOCAINE HYDROCHLORIDE 10 MG/ML
INJECTION, SOLUTION INFILTRATION; PERINEURAL PRN
Status: DISCONTINUED | OUTPATIENT
Start: 2021-04-29 | End: 2021-04-29

## 2021-04-29 RX ORDER — ONDANSETRON 2 MG/ML
4 INJECTION INTRAMUSCULAR; INTRAVENOUS EVERY 6 HOURS PRN
Status: DISCONTINUED | OUTPATIENT
Start: 2021-04-29 | End: 2021-05-01 | Stop reason: HOSPADM

## 2021-04-29 RX ORDER — FENTANYL CITRATE 50 UG/ML
25-50 INJECTION, SOLUTION INTRAMUSCULAR; INTRAVENOUS EVERY 5 MIN PRN
Status: DISCONTINUED | OUTPATIENT
Start: 2021-04-29 | End: 2021-04-29 | Stop reason: HOSPADM

## 2021-04-29 RX ORDER — ACETAMINOPHEN 325 MG/1
975 TABLET ORAL EVERY 8 HOURS
Status: DISCONTINUED | OUTPATIENT
Start: 2021-04-29 | End: 2021-05-01 | Stop reason: HOSPADM

## 2021-04-29 RX ORDER — ROPIVACAINE HYDROCHLORIDE 7.5 MG/ML
INJECTION, SOLUTION EPIDURAL; PERINEURAL PRN
Status: DISCONTINUED | OUTPATIENT
Start: 2021-04-29 | End: 2021-04-29

## 2021-04-29 RX ORDER — AMOXICILLIN 250 MG
1 CAPSULE ORAL 2 TIMES DAILY
Status: DISCONTINUED | OUTPATIENT
Start: 2021-04-29 | End: 2021-05-01 | Stop reason: HOSPADM

## 2021-04-29 RX ORDER — POLYETHYLENE GLYCOL 3350 17 G/17G
17 POWDER, FOR SOLUTION ORAL DAILY
Status: DISCONTINUED | OUTPATIENT
Start: 2021-04-30 | End: 2021-05-01 | Stop reason: HOSPADM

## 2021-04-29 RX ORDER — ONDANSETRON 4 MG/1
4 TABLET, ORALLY DISINTEGRATING ORAL EVERY 30 MIN PRN
Status: DISCONTINUED | OUTPATIENT
Start: 2021-04-29 | End: 2021-04-29 | Stop reason: HOSPADM

## 2021-04-29 RX ORDER — FINASTERIDE 5 MG/1
5 TABLET, FILM COATED ORAL DAILY
Status: DISCONTINUED | OUTPATIENT
Start: 2021-04-29 | End: 2021-05-01 | Stop reason: HOSPADM

## 2021-04-29 RX ORDER — HYDROXYZINE HYDROCHLORIDE 50 MG/1
50 TABLET, FILM COATED ORAL EVERY 6 HOURS PRN
Status: DISCONTINUED | OUTPATIENT
Start: 2021-04-29 | End: 2021-04-29 | Stop reason: HOSPADM

## 2021-04-29 RX ORDER — DIAZEPAM 5 MG
5 TABLET ORAL EVERY 6 HOURS PRN
Status: DISCONTINUED | OUTPATIENT
Start: 2021-04-29 | End: 2021-05-01 | Stop reason: HOSPADM

## 2021-04-29 RX ORDER — SODIUM CHLORIDE, SODIUM LACTATE, POTASSIUM CHLORIDE, CALCIUM CHLORIDE 600; 310; 30; 20 MG/100ML; MG/100ML; MG/100ML; MG/100ML
INJECTION, SOLUTION INTRAVENOUS CONTINUOUS
Status: DISCONTINUED | OUTPATIENT
Start: 2021-04-29 | End: 2021-04-29 | Stop reason: HOSPADM

## 2021-04-29 RX ORDER — PROPOFOL 10 MG/ML
INJECTION, EMULSION INTRAVENOUS PRN
Status: DISCONTINUED | OUTPATIENT
Start: 2021-04-29 | End: 2021-04-29

## 2021-04-29 RX ORDER — LIDOCAINE 40 MG/G
CREAM TOPICAL
Status: DISCONTINUED | OUTPATIENT
Start: 2021-04-29 | End: 2021-04-29 | Stop reason: HOSPADM

## 2021-04-29 RX ORDER — OXYCODONE HYDROCHLORIDE 5 MG/1
10 TABLET ORAL EVERY 4 HOURS PRN
Status: DISCONTINUED | OUTPATIENT
Start: 2021-04-29 | End: 2021-05-01 | Stop reason: HOSPADM

## 2021-04-29 RX ORDER — LIDOCAINE HYDROCHLORIDE 10 MG/ML
INJECTION, SOLUTION EPIDURAL; INFILTRATION; INTRACAUDAL; PERINEURAL PRN
Status: DISCONTINUED | OUTPATIENT
Start: 2021-04-29 | End: 2021-04-29

## 2021-04-29 RX ORDER — NALOXONE HYDROCHLORIDE 0.4 MG/ML
0.4 INJECTION, SOLUTION INTRAMUSCULAR; INTRAVENOUS; SUBCUTANEOUS
Status: DISCONTINUED | OUTPATIENT
Start: 2021-04-29 | End: 2021-05-01 | Stop reason: HOSPADM

## 2021-04-29 RX ORDER — BUPIVACAINE HYDROCHLORIDE 7.5 MG/ML
INJECTION, SOLUTION INTRASPINAL PRN
Status: DISCONTINUED | OUTPATIENT
Start: 2021-04-29 | End: 2021-04-29

## 2021-04-29 RX ORDER — ONDANSETRON 4 MG/1
4 TABLET, ORALLY DISINTEGRATING ORAL EVERY 6 HOURS PRN
Status: DISCONTINUED | OUTPATIENT
Start: 2021-04-29 | End: 2021-05-01 | Stop reason: HOSPADM

## 2021-04-29 RX ORDER — FUROSEMIDE 20 MG
20 TABLET ORAL DAILY
Status: DISCONTINUED | OUTPATIENT
Start: 2021-04-29 | End: 2021-05-01 | Stop reason: HOSPADM

## 2021-04-29 RX ORDER — ONDANSETRON 2 MG/ML
4 INJECTION INTRAMUSCULAR; INTRAVENOUS EVERY 30 MIN PRN
Status: DISCONTINUED | OUTPATIENT
Start: 2021-04-29 | End: 2021-04-29 | Stop reason: HOSPADM

## 2021-04-29 RX ORDER — ALBUTEROL SULFATE 0.83 MG/ML
2.5 SOLUTION RESPIRATORY (INHALATION) EVERY 6 HOURS PRN
Status: DISCONTINUED | OUTPATIENT
Start: 2021-04-29 | End: 2021-05-01 | Stop reason: HOSPADM

## 2021-04-29 RX ORDER — SODIUM CHLORIDE, SODIUM LACTATE, POTASSIUM CHLORIDE, CALCIUM CHLORIDE 600; 310; 30; 20 MG/100ML; MG/100ML; MG/100ML; MG/100ML
INJECTION, SOLUTION INTRAVENOUS CONTINUOUS
Status: DISCONTINUED | OUTPATIENT
Start: 2021-04-29 | End: 2021-05-01 | Stop reason: HOSPADM

## 2021-04-29 RX ORDER — ACETAMINOPHEN 325 MG/1
650 TABLET ORAL EVERY 4 HOURS PRN
Status: DISCONTINUED | OUTPATIENT
Start: 2021-05-02 | End: 2021-05-01 | Stop reason: HOSPADM

## 2021-04-29 RX ORDER — AMOXICILLIN 250 MG
1-2 CAPSULE ORAL DAILY PRN
Qty: 15 TABLET | Refills: 0 | Status: SHIPPED | OUTPATIENT
Start: 2021-04-29

## 2021-04-29 RX ORDER — PROPOFOL 10 MG/ML
INJECTION, EMULSION INTRAVENOUS CONTINUOUS PRN
Status: DISCONTINUED | OUTPATIENT
Start: 2021-04-29 | End: 2021-04-29

## 2021-04-29 RX ORDER — HYDROMORPHONE HYDROCHLORIDE 1 MG/ML
0.4 INJECTION, SOLUTION INTRAMUSCULAR; INTRAVENOUS; SUBCUTANEOUS
Status: DISCONTINUED | OUTPATIENT
Start: 2021-04-29 | End: 2021-05-01 | Stop reason: HOSPADM

## 2021-04-29 RX ORDER — PROCHLORPERAZINE MALEATE 5 MG
10 TABLET ORAL EVERY 6 HOURS PRN
Status: DISCONTINUED | OUTPATIENT
Start: 2021-04-29 | End: 2021-05-01 | Stop reason: HOSPADM

## 2021-04-29 RX ORDER — CEFAZOLIN SODIUM 2 G/100ML
2 INJECTION, SOLUTION INTRAVENOUS SEE ADMIN INSTRUCTIONS
Status: DISCONTINUED | OUTPATIENT
Start: 2021-04-29 | End: 2021-04-29 | Stop reason: HOSPADM

## 2021-04-29 RX ORDER — BISACODYL 10 MG
10 SUPPOSITORY, RECTAL RECTAL DAILY PRN
Status: DISCONTINUED | OUTPATIENT
Start: 2021-04-29 | End: 2021-05-01 | Stop reason: HOSPADM

## 2021-04-29 RX ORDER — OXYCODONE HYDROCHLORIDE 5 MG/1
5-10 TABLET ORAL
Qty: 40 TABLET | Refills: 0 | Status: SHIPPED | OUTPATIENT
Start: 2021-04-29

## 2021-04-29 RX ORDER — SPIRONOLACTONE 25 MG/1
25 TABLET ORAL DAILY
Status: DISCONTINUED | OUTPATIENT
Start: 2021-04-29 | End: 2021-05-01 | Stop reason: HOSPADM

## 2021-04-29 RX ORDER — HYDROXYZINE HYDROCHLORIDE 25 MG/1
25-50 TABLET, FILM COATED ORAL EVERY 6 HOURS PRN
Status: DISCONTINUED | OUTPATIENT
Start: 2021-04-29 | End: 2021-05-01 | Stop reason: HOSPADM

## 2021-04-29 RX ORDER — ONDANSETRON 2 MG/ML
INJECTION INTRAMUSCULAR; INTRAVENOUS PRN
Status: DISCONTINUED | OUTPATIENT
Start: 2021-04-29 | End: 2021-04-29

## 2021-04-29 RX ORDER — LIDOCAINE 40 MG/G
CREAM TOPICAL
Status: DISCONTINUED | OUTPATIENT
Start: 2021-04-29 | End: 2021-05-01 | Stop reason: HOSPADM

## 2021-04-29 RX ORDER — NALOXONE HYDROCHLORIDE 0.4 MG/ML
0.2 INJECTION, SOLUTION INTRAMUSCULAR; INTRAVENOUS; SUBCUTANEOUS
Status: DISCONTINUED | OUTPATIENT
Start: 2021-04-29 | End: 2021-05-01 | Stop reason: HOSPADM

## 2021-04-29 RX ORDER — CEFAZOLIN SODIUM 2 G/100ML
2 INJECTION, SOLUTION INTRAVENOUS
Status: COMPLETED | OUTPATIENT
Start: 2021-04-29 | End: 2021-04-29

## 2021-04-29 RX ORDER — DEXAMETHASONE SODIUM PHOSPHATE 4 MG/ML
INJECTION, SOLUTION INTRA-ARTICULAR; INTRALESIONAL; INTRAMUSCULAR; INTRAVENOUS; SOFT TISSUE PRN
Status: DISCONTINUED | OUTPATIENT
Start: 2021-04-29 | End: 2021-04-29

## 2021-04-29 RX ORDER — DOCUSATE SODIUM 100 MG/1
100 CAPSULE, LIQUID FILLED ORAL 2 TIMES DAILY
Status: DISCONTINUED | OUTPATIENT
Start: 2021-04-29 | End: 2021-05-01 | Stop reason: HOSPADM

## 2021-04-29 RX ORDER — TAMSULOSIN HYDROCHLORIDE 0.4 MG/1
0.4 CAPSULE ORAL DAILY
Status: DISCONTINUED | OUTPATIENT
Start: 2021-04-29 | End: 2021-05-01 | Stop reason: HOSPADM

## 2021-04-29 RX ORDER — FOLIC ACID 1 MG/1
1 TABLET ORAL DAILY
Status: DISCONTINUED | OUTPATIENT
Start: 2021-04-29 | End: 2021-05-01 | Stop reason: HOSPADM

## 2021-04-29 RX ORDER — HYDROXYZINE HYDROCHLORIDE 25 MG/1
25 TABLET, FILM COATED ORAL EVERY 6 HOURS PRN
Status: DISCONTINUED | OUTPATIENT
Start: 2021-04-29 | End: 2021-04-29 | Stop reason: HOSPADM

## 2021-04-29 RX ORDER — TRANEXAMIC ACID 650 MG/1
1950 TABLET ORAL ONCE
Status: COMPLETED | OUTPATIENT
Start: 2021-04-29 | End: 2021-04-29

## 2021-04-29 RX ORDER — POTASSIUM CHLORIDE 1500 MG/1
20 TABLET, EXTENDED RELEASE ORAL 2 TIMES DAILY
Status: DISCONTINUED | OUTPATIENT
Start: 2021-04-29 | End: 2021-05-01 | Stop reason: HOSPADM

## 2021-04-29 RX ORDER — MONTELUKAST SODIUM 10 MG/1
10 TABLET ORAL AT BEDTIME
Status: DISCONTINUED | OUTPATIENT
Start: 2021-04-29 | End: 2021-05-01 | Stop reason: HOSPADM

## 2021-04-29 RX ORDER — GABAPENTIN 300 MG/1
300 CAPSULE ORAL 2 TIMES DAILY
Status: DISCONTINUED | OUTPATIENT
Start: 2021-04-29 | End: 2021-05-01 | Stop reason: HOSPADM

## 2021-04-29 RX ORDER — IBUPROFEN 600 MG/1
600 TABLET, FILM COATED ORAL EVERY 6 HOURS PRN
Qty: 30 TABLET | Refills: 0 | Status: ON HOLD
Start: 2021-04-29 | End: 2021-05-20

## 2021-04-29 RX ORDER — FENTANYL CITRATE 50 UG/ML
INJECTION, SOLUTION INTRAMUSCULAR; INTRAVENOUS PRN
Status: DISCONTINUED | OUTPATIENT
Start: 2021-04-29 | End: 2021-04-29

## 2021-04-29 RX ORDER — OXYCODONE HYDROCHLORIDE 5 MG/1
5 TABLET ORAL EVERY 4 HOURS PRN
Status: DISCONTINUED | OUTPATIENT
Start: 2021-04-29 | End: 2021-05-01 | Stop reason: HOSPADM

## 2021-04-29 RX ORDER — KETAMINE HYDROCHLORIDE 10 MG/ML
INJECTION, SOLUTION INTRAMUSCULAR; INTRAVENOUS PRN
Status: DISCONTINUED | OUTPATIENT
Start: 2021-04-29 | End: 2021-04-29

## 2021-04-29 RX ORDER — HYDROMORPHONE HCL IN WATER/PF 6 MG/30 ML
0.2 PATIENT CONTROLLED ANALGESIA SYRINGE INTRAVENOUS
Status: DISCONTINUED | OUTPATIENT
Start: 2021-04-29 | End: 2021-05-01 | Stop reason: HOSPADM

## 2021-04-29 RX ORDER — CEFAZOLIN SODIUM 2 G/100ML
2 INJECTION, SOLUTION INTRAVENOUS EVERY 8 HOURS
Status: COMPLETED | OUTPATIENT
Start: 2021-04-29 | End: 2021-04-30

## 2021-04-29 RX ORDER — LIDOCAINE HCL/EPINEPHRINE/PF 2%-1:200K
VIAL (ML) INJECTION PRN
Status: DISCONTINUED | OUTPATIENT
Start: 2021-04-29 | End: 2021-04-29

## 2021-04-29 RX ORDER — PANTOPRAZOLE SODIUM 20 MG/1
40 TABLET, DELAYED RELEASE ORAL 2 TIMES DAILY
Status: DISCONTINUED | OUTPATIENT
Start: 2021-04-29 | End: 2021-05-01 | Stop reason: HOSPADM

## 2021-04-29 RX ADMIN — OXYCODONE HYDROCHLORIDE 10 MG: 5 TABLET ORAL at 19:40

## 2021-04-29 RX ADMIN — PROPOFOL 20 MG: 10 INJECTION, EMULSION INTRAVENOUS at 15:28

## 2021-04-29 RX ADMIN — FENTANYL CITRATE 50 MCG: 50 INJECTION, SOLUTION INTRAMUSCULAR; INTRAVENOUS at 17:26

## 2021-04-29 RX ADMIN — SPIRONOLACTONE 25 MG: 25 TABLET ORAL at 19:41

## 2021-04-29 RX ADMIN — ROPIVACAINE HYDROCHLORIDE 20 ML: 7.5 INJECTION, SOLUTION EPIDURAL; PERINEURAL at 16:57

## 2021-04-29 RX ADMIN — ATORVASTATIN CALCIUM 20 MG: 20 TABLET, FILM COATED ORAL at 19:41

## 2021-04-29 RX ADMIN — LIDOCAINE HYDROCHLORIDE 0.1 ML: 10 INJECTION, SOLUTION EPIDURAL; INFILTRATION; INTRACAUDAL; PERINEURAL at 13:21

## 2021-04-29 RX ADMIN — LIDOCAINE HYDROCHLORIDE 2 ML: 10 INJECTION, SOLUTION EPIDURAL; INFILTRATION; INTRACAUDAL; PERINEURAL at 16:55

## 2021-04-29 RX ADMIN — PHENYLEPHRINE HYDROCHLORIDE 100 MCG: 10 INJECTION INTRAVENOUS at 16:00

## 2021-04-29 RX ADMIN — SODIUM CHLORIDE, POTASSIUM CHLORIDE, SODIUM LACTATE AND CALCIUM CHLORIDE: 600; 310; 30; 20 INJECTION, SOLUTION INTRAVENOUS at 16:14

## 2021-04-29 RX ADMIN — POTASSIUM CHLORIDE 20 MEQ: 20 TABLET, EXTENDED RELEASE ORAL at 19:41

## 2021-04-29 RX ADMIN — PROPOFOL 75 MCG/KG/MIN: 10 INJECTION, EMULSION INTRAVENOUS at 14:06

## 2021-04-29 RX ADMIN — FENTANYL CITRATE 50 MCG: 50 INJECTION, SOLUTION INTRAMUSCULAR; INTRAVENOUS at 13:59

## 2021-04-29 RX ADMIN — CEFAZOLIN SODIUM 2 G: 2 INJECTION, SOLUTION INTRAVENOUS at 22:07

## 2021-04-29 RX ADMIN — OXYCODONE HYDROCHLORIDE 10 MG: 5 TABLET ORAL at 23:49

## 2021-04-29 RX ADMIN — FUROSEMIDE 20 MG: 20 TABLET ORAL at 19:41

## 2021-04-29 RX ADMIN — LIDOCAINE HYDROCHLORIDE,EPINEPHRINE BITARTRATE 5 ML: 20; .005 INJECTION, SOLUTION EPIDURAL; INFILTRATION; INTRACAUDAL; PERINEURAL at 16:56

## 2021-04-29 RX ADMIN — MIDAZOLAM 2 MG: 1 INJECTION INTRAMUSCULAR; INTRAVENOUS at 13:55

## 2021-04-29 RX ADMIN — TRAZODONE HYDROCHLORIDE 50 MG: 50 TABLET ORAL at 22:58

## 2021-04-29 RX ADMIN — PANTOPRAZOLE SODIUM 40 MG: 20 TABLET, DELAYED RELEASE ORAL at 19:41

## 2021-04-29 RX ADMIN — KETAMINE HYDROCHLORIDE 30 MG: 10 INJECTION INTRAMUSCULAR; INTRAVENOUS at 14:15

## 2021-04-29 RX ADMIN — GABAPENTIN 300 MG: 300 CAPSULE ORAL at 19:41

## 2021-04-29 RX ADMIN — FENTANYL CITRATE 25 MCG: 50 INJECTION, SOLUTION INTRAMUSCULAR; INTRAVENOUS at 17:53

## 2021-04-29 RX ADMIN — BUPIVACAINE HYDROCHLORIDE IN DEXTROSE 1.8 ML: 7.5 INJECTION, SOLUTION SUBARACHNOID at 14:04

## 2021-04-29 RX ADMIN — GABAPENTIN 300 MG: 300 CAPSULE ORAL at 12:50

## 2021-04-29 RX ADMIN — CEFAZOLIN SODIUM 2 G: 2 INJECTION, SOLUTION INTRAVENOUS at 13:59

## 2021-04-29 RX ADMIN — HYDROMORPHONE HYDROCHLORIDE 0.5 MG: 1 INJECTION, SOLUTION INTRAMUSCULAR; INTRAVENOUS; SUBCUTANEOUS at 17:51

## 2021-04-29 RX ADMIN — ONDANSETRON 4 MG: 2 INJECTION INTRAMUSCULAR; INTRAVENOUS at 14:15

## 2021-04-29 RX ADMIN — PHENYLEPHRINE HYDROCHLORIDE 100 MCG: 10 INJECTION INTRAVENOUS at 15:50

## 2021-04-29 RX ADMIN — FENTANYL CITRATE 50 MCG: 50 INJECTION, SOLUTION INTRAMUSCULAR; INTRAVENOUS at 14:01

## 2021-04-29 RX ADMIN — ACETAMINOPHEN 975 MG: 325 TABLET, FILM COATED ORAL at 19:34

## 2021-04-29 RX ADMIN — FENTANYL CITRATE 50 MCG: 50 INJECTION, SOLUTION INTRAMUSCULAR; INTRAVENOUS at 16:59

## 2021-04-29 RX ADMIN — HYDROMORPHONE HYDROCHLORIDE 0.4 MG: 1 INJECTION, SOLUTION INTRAMUSCULAR; INTRAVENOUS; SUBCUTANEOUS at 21:38

## 2021-04-29 RX ADMIN — ASPIRIN 325 MG: 325 TABLET, COATED ORAL at 19:41

## 2021-04-29 RX ADMIN — TAMSULOSIN HYDROCHLORIDE 0.4 MG: 0.4 CAPSULE ORAL at 19:41

## 2021-04-29 RX ADMIN — KETAMINE HYDROCHLORIDE 20 MG: 10 INJECTION INTRAMUSCULAR; INTRAVENOUS at 14:23

## 2021-04-29 RX ADMIN — DOCUSATE SODIUM AND SENNOSIDES 1 TABLET: 8.6; 5 TABLET ORAL at 19:41

## 2021-04-29 RX ADMIN — FOLIC ACID 1 MG: 1 TABLET ORAL at 19:41

## 2021-04-29 RX ADMIN — SODIUM CHLORIDE, POTASSIUM CHLORIDE, SODIUM LACTATE AND CALCIUM CHLORIDE 1000 ML: 600; 310; 30; 20 INJECTION, SOLUTION INTRAVENOUS at 13:20

## 2021-04-29 RX ADMIN — HYDROXYZINE HYDROCHLORIDE 50 MG: 50 TABLET, FILM COATED ORAL at 17:24

## 2021-04-29 RX ADMIN — LIDOCAINE HYDROCHLORIDE 30 MG: 10 INJECTION, SOLUTION INFILTRATION; PERINEURAL at 14:03

## 2021-04-29 RX ADMIN — MONTELUKAST 10 MG: 10 TABLET, FILM COATED ORAL at 22:58

## 2021-04-29 RX ADMIN — SODIUM CHLORIDE, POTASSIUM CHLORIDE, SODIUM LACTATE AND CALCIUM CHLORIDE: 600; 310; 30; 20 INJECTION, SOLUTION INTRAVENOUS at 21:42

## 2021-04-29 RX ADMIN — LIDOCAINE HYDROCHLORIDE 50 MG: 10 INJECTION, SOLUTION INFILTRATION; PERINEURAL at 14:18

## 2021-04-29 RX ADMIN — RIFAXIMIN 550 MG: 550 TABLET ORAL at 19:41

## 2021-04-29 RX ADMIN — DEXAMETHASONE SODIUM PHOSPHATE 10 MG: 4 INJECTION, SOLUTION INTRA-ARTICULAR; INTRALESIONAL; INTRAMUSCULAR; INTRAVENOUS; SOFT TISSUE at 14:15

## 2021-04-29 RX ADMIN — FINASTERIDE 5 MG: 5 TABLET, FILM COATED ORAL at 19:41

## 2021-04-29 RX ADMIN — FENTANYL CITRATE 50 MCG: 50 INJECTION, SOLUTION INTRAMUSCULAR; INTRAVENOUS at 16:55

## 2021-04-29 RX ADMIN — TRANEXAMIC ACID 1950 MG: 650 TABLET ORAL at 12:50

## 2021-04-29 ASSESSMENT — MIFFLIN-ST. JEOR
SCORE: 1582.75
SCORE: 1563.76

## 2021-04-29 ASSESSMENT — COPD QUESTIONNAIRES: COPD: 1

## 2021-04-29 ASSESSMENT — LIFESTYLE VARIABLES: TOBACCO_USE: 1

## 2021-04-29 NOTE — ANESTHESIA PROCEDURE NOTES
Adductor canal Procedure Note  Pre-Procedure   Staff -        CRNA: Bhakti Guillen APRN CRNA       Performed By: CRNA       Location: post-op       Pre-Anesthestic Checklist: patient identified, IV checked, site marked, risks and benefits discussed, informed consent, monitors and equipment checked, pre-op evaluation, at physician/surgeon's request and post-op pain management  Timeout:       Correct Patient: Yes        Correct Procedure: Yes        Correct Site: Yes        Correct Position: Yes        Correct Laterality: Yes        Site Marked: Yes  Procedure Documentation  Procedure: Adductor canal       Diagnosis: POST OP PAIN       Laterality: left       Patient Position: supine       Patient Prep/Sterile Barriers: sterile gloves, mask, patient draped       Skin prep: Chloraprep       Local skin infiltrated with 2 mL of 1% lidocaine.        Needle Type: insulated       Needle Gauge: 21.        Needle Length (Inches): 4        - Ultrasound guided       - Ultrasound used to identify targeted nerve, plexus, vascular marker, or fascial plane and place a needle adjacent to it in real-time       - Ultrasound was used to visualize the spread of anesthetic in close proximity to the above referenced structure    Assessment/Narrative         The placement was negative for: blood aspirated, painful injection and site bleeding       Paresthesias: No.       Test dose of 5 mL lidocaine 2% w/ 1:200,000 epinephrine at 16:56.         Test dose negative, 3 minutes after injection, for signs of intravascular, subdural, or intrathecal injection.
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
## 2021-04-29 NOTE — OR NURSING
"Pt appears more comfortable. Patients pain level back to baseline at 8/10. Bhakti Guillen cRNA consulted regarding elevated blood pressure. Bhakti Guillen CRNA states, \"Im fine with him going up with that.\" Last pressure reading was 165/96.    Nenita Luong RN on 4/29/2021 at 6:18 PM    "

## 2021-04-29 NOTE — ANESTHESIA PROCEDURE NOTES
Intrathecal injection Procedure Note  Pre-Procedure   Staff -        CRNA: Bhakti Guillen APRN CRNA       Other Anesthesia Staff: Morgan Payne       Performed By: SRNA and with CRNAs       Procedure performed by resident/CRNA in presence of a teaching physician.         Location: OR       Pre-Anesthestic Checklist: patient identified, IV checked, risks and benefits discussed, informed consent, monitors and equipment checked, pre-op evaluation and at physician/surgeon's request  Timeout:       Correct Patient: Yes        Correct Procedure: Yes        Correct Site: Yes        Correct Position: Yes   Procedure Documentation  Procedure: intrathecal injection       Patient Position: sitting       Patient Prep/Sterile Barriers: sterile gloves, mask, patient draped       Skin prep: Betadine       Insertion Site: L3-4. (midline approach).       Needle Gauge: 25.        Needle Length (Inches): 3.5        Spinal Needle Type: Pencan       Introducer used       # of attempts: 1 and  # of redirects:  0    Assessment/Narrative         Paresthesias: No.       CSF fluid: clear.

## 2021-04-29 NOTE — ANESTHESIA POSTPROCEDURE EVALUATION
Patient: Abhijeet Mccauley    Procedure(s):  REVISION, TOTAL ARTHROPLASTY, KNEE    Diagnosis:Failed total knee arthroplasty (H) [T84.018A, Z96.652]  Diagnosis Additional Information: No value filed.    Anesthesia Type:  No value filed.    Note:  Disposition: Inpatient   Postop Pain Control: Uneventful            Sign Out: Well controlled pain   PONV: No   Neuro/Psych: Uneventful            Sign Out: Acceptable/Baseline neuro status   Airway/Respiratory: Uneventful            Sign Out: Acceptable/Baseline resp. status   CV/Hemodynamics: Uneventful            Sign Out: Acceptable CV status; No obvious hypovolemia; No obvious fluid overload   Other NRE: NONE   DID A NON-ROUTINE EVENT OCCUR? No           Last vitals:  Vitals:    04/29/21 1705 04/29/21 1710 04/29/21 1715   BP: (!) 156/87 (!) 150/90 (!) 151/79   Pulse: 76 82 82   Resp: 13 22 20   Temp:      SpO2: 99% 99% 99%       Last vitals prior to Anesthesia Care Transfer:  CRNA VITALS  4/29/2021 1550 - 4/29/2021 1650      4/29/2021             Pulse:  79    SpO2:  100 %          Electronically Signed By: LOPEZ Hubbard CRNA  April 29, 2021  5:27 PM

## 2021-04-29 NOTE — BRIEF OP NOTE
[facility]    Brief Operative Note    Pre-operative diagnosis: Unstable left knee replacement  Post-operative diagnosis Same as pre-operative diagnosis    Procedure: Procedure(s):  REVISION, TOTAL ARTHROPLASTY, KNEE  Surgeon: Surgeon(s) and Role:     * Carlton Jones MD - Primary  Anesthesia: Spinal   Estimated blood loss: Less than 50 ml  Drains: None  Specimens: * No specimens in log *  Findings:   No gross loosening.  No obvious poly wear  Complications: None.  Implants: * No implants in log *.  Retained Biomet Vanguard 67mm tibial tray and new [employer] Vanguard 65mm PS femur with 2.5mm offset, 40mm x 17mm pressfit stem and 16mm PS polyethylene    WBAT left leg  ASA 325qd x 30d  Perioperative antibiotics  Home in 1-2 days pending pain control, progress with therapy    Carlton Jones MD

## 2021-04-30 ENCOUNTER — APPOINTMENT (OUTPATIENT)
Dept: OCCUPATIONAL THERAPY | Facility: CLINIC | Age: 61
End: 2021-04-30
Attending: ORTHOPAEDIC SURGERY
Payer: COMMERCIAL

## 2021-04-30 ENCOUNTER — IMMUNIZATION (OUTPATIENT)
Dept: FAMILY MEDICINE | Facility: CLINIC | Age: 61
End: 2021-04-30
Attending: FAMILY MEDICINE
Payer: COMMERCIAL

## 2021-04-30 ENCOUNTER — APPOINTMENT (OUTPATIENT)
Dept: PHYSICAL THERAPY | Facility: CLINIC | Age: 61
End: 2021-04-30
Attending: ORTHOPAEDIC SURGERY
Payer: COMMERCIAL

## 2021-04-30 PROBLEM — D64.9 ANEMIA: Status: ACTIVE | Noted: 2020-08-13

## 2021-04-30 LAB
ANION GAP SERPL CALCULATED.3IONS-SCNC: 5 MMOL/L (ref 3–14)
BUN SERPL-MCNC: 23 MG/DL (ref 7–30)
CALCIUM SERPL-MCNC: 8.2 MG/DL (ref 8.5–10.1)
CHLORIDE SERPL-SCNC: 108 MMOL/L (ref 94–109)
CO2 SERPL-SCNC: 22 MMOL/L (ref 20–32)
CREAT SERPL-MCNC: 2.1 MG/DL (ref 0.66–1.25)
GFR SERPL CREATININE-BSD FRML MDRD: 33 ML/MIN/{1.73_M2}
GLUCOSE BLDC GLUCOMTR-MCNC: 178 MG/DL (ref 70–99)
GLUCOSE SERPL-MCNC: 137 MG/DL (ref 70–99)
HGB BLD-MCNC: 8.4 G/DL (ref 13.3–17.7)
MAGNESIUM SERPL-MCNC: 1.7 MG/DL (ref 1.6–2.3)
PHOSPHATE SERPL-MCNC: 1.8 MG/DL (ref 2.5–4.5)
POTASSIUM SERPL-SCNC: 4.7 MMOL/L (ref 3.4–5.3)
SODIUM SERPL-SCNC: 135 MMOL/L (ref 133–144)

## 2021-04-30 PROCEDURE — 250N000013 HC RX MED GY IP 250 OP 250 PS 637: Performed by: PHYSICIAN ASSISTANT

## 2021-04-30 PROCEDURE — 250N000013 HC RX MED GY IP 250 OP 250 PS 637: Performed by: ORTHOPAEDIC SURGERY

## 2021-04-30 PROCEDURE — 99207 PR CDG-CODE CATEGORY CHANGED: CPT | Performed by: PHYSICIAN ASSISTANT

## 2021-04-30 PROCEDURE — 93005 ELECTROCARDIOGRAM TRACING: CPT

## 2021-04-30 PROCEDURE — 99224 PR SUBSEQUENT OBSERVATION CARE,LEVEL I: CPT | Performed by: PHYSICIAN ASSISTANT

## 2021-04-30 PROCEDURE — 97161 PT EVAL LOW COMPLEX 20 MIN: CPT | Mod: GP

## 2021-04-30 PROCEDURE — 999N000156 HC STATISTIC RCP CONSULT EA 30 MIN

## 2021-04-30 PROCEDURE — 97116 GAIT TRAINING THERAPY: CPT | Mod: GP

## 2021-04-30 PROCEDURE — 97535 SELF CARE MNGMENT TRAINING: CPT | Mod: GO

## 2021-04-30 PROCEDURE — 85018 HEMOGLOBIN: CPT | Performed by: ORTHOPAEDIC SURGERY

## 2021-04-30 PROCEDURE — 80048 BASIC METABOLIC PNL TOTAL CA: CPT | Performed by: PHYSICIAN ASSISTANT

## 2021-04-30 PROCEDURE — 82962 GLUCOSE BLOOD TEST: CPT

## 2021-04-30 PROCEDURE — 250N000011 HC RX IP 250 OP 636: Performed by: ORTHOPAEDIC SURGERY

## 2021-04-30 PROCEDURE — 97165 OT EVAL LOW COMPLEX 30 MIN: CPT | Mod: GO

## 2021-04-30 PROCEDURE — 84100 ASSAY OF PHOSPHORUS: CPT | Performed by: PHYSICIAN ASSISTANT

## 2021-04-30 PROCEDURE — 36415 COLL VENOUS BLD VENIPUNCTURE: CPT | Performed by: ORTHOPAEDIC SURGERY

## 2021-04-30 PROCEDURE — 83735 ASSAY OF MAGNESIUM: CPT | Performed by: PHYSICIAN ASSISTANT

## 2021-04-30 PROCEDURE — 250N000009 HC RX 250: Performed by: PHYSICIAN ASSISTANT

## 2021-04-30 RX ORDER — LORAZEPAM 1 MG/1
1-2 TABLET ORAL EVERY 30 MIN PRN
Status: DISCONTINUED | OUTPATIENT
Start: 2021-04-30 | End: 2021-05-01 | Stop reason: HOSPADM

## 2021-04-30 RX ORDER — FERROUS SULFATE 325(65) MG
325 TABLET ORAL EVERY OTHER DAY
Status: DISCONTINUED | OUTPATIENT
Start: 2021-04-30 | End: 2021-05-01 | Stop reason: HOSPADM

## 2021-04-30 RX ORDER — LORAZEPAM 2 MG/ML
1-2 INJECTION INTRAMUSCULAR EVERY 30 MIN PRN
Status: DISCONTINUED | OUTPATIENT
Start: 2021-04-30 | End: 2021-05-01 | Stop reason: HOSPADM

## 2021-04-30 RX ORDER — DIAZEPAM 5 MG
5 TABLET ORAL EVERY 6 HOURS PRN
Qty: 40 TABLET | Refills: 0 | Status: SHIPPED | OUTPATIENT
Start: 2021-04-30

## 2021-04-30 RX ORDER — FLUMAZENIL 0.1 MG/ML
0.2 INJECTION, SOLUTION INTRAVENOUS
Status: DISCONTINUED | OUTPATIENT
Start: 2021-04-30 | End: 2021-05-01 | Stop reason: HOSPADM

## 2021-04-30 RX ORDER — LANOLIN ALCOHOL/MO/W.PET/CERES
100 CREAM (GRAM) TOPICAL DAILY
Status: DISCONTINUED | OUTPATIENT
Start: 2021-04-30 | End: 2021-05-01 | Stop reason: HOSPADM

## 2021-04-30 RX ADMIN — ATORVASTATIN CALCIUM 20 MG: 20 TABLET, FILM COATED ORAL at 08:39

## 2021-04-30 RX ADMIN — OXYCODONE HYDROCHLORIDE 10 MG: 5 TABLET ORAL at 12:35

## 2021-04-30 RX ADMIN — GABAPENTIN 300 MG: 300 CAPSULE ORAL at 19:42

## 2021-04-30 RX ADMIN — FUROSEMIDE 20 MG: 20 TABLET ORAL at 08:38

## 2021-04-30 RX ADMIN — DOCUSATE SODIUM 100 MG: 100 CAPSULE, LIQUID FILLED ORAL at 19:42

## 2021-04-30 RX ADMIN — OXYCODONE HYDROCHLORIDE 10 MG: 5 TABLET ORAL at 21:37

## 2021-04-30 RX ADMIN — MONTELUKAST 10 MG: 10 TABLET, FILM COATED ORAL at 21:37

## 2021-04-30 RX ADMIN — ASPIRIN 325 MG: 325 TABLET, COATED ORAL at 08:37

## 2021-04-30 RX ADMIN — TRAZODONE HYDROCHLORIDE 50 MG: 50 TABLET ORAL at 22:25

## 2021-04-30 RX ADMIN — PANTOPRAZOLE SODIUM 40 MG: 20 TABLET, DELAYED RELEASE ORAL at 19:41

## 2021-04-30 RX ADMIN — POLYETHYLENE GLYCOL 3350 17 G: 17 POWDER, FOR SOLUTION ORAL at 08:36

## 2021-04-30 RX ADMIN — FINASTERIDE 5 MG: 5 TABLET, FILM COATED ORAL at 08:37

## 2021-04-30 RX ADMIN — THIAMINE HCL TAB 100 MG 100 MG: 100 TAB at 12:34

## 2021-04-30 RX ADMIN — CEFAZOLIN SODIUM 2 G: 2 INJECTION, SOLUTION INTRAVENOUS at 05:58

## 2021-04-30 RX ADMIN — DOCUSATE SODIUM 100 MG: 100 CAPSULE, LIQUID FILLED ORAL at 08:38

## 2021-04-30 RX ADMIN — SPIRONOLACTONE 25 MG: 25 TABLET ORAL at 08:38

## 2021-04-30 RX ADMIN — DOCUSATE SODIUM AND SENNOSIDES 1 TABLET: 8.6; 5 TABLET ORAL at 19:41

## 2021-04-30 RX ADMIN — OXYCODONE HYDROCHLORIDE 10 MG: 5 TABLET ORAL at 16:29

## 2021-04-30 RX ADMIN — ALBUTEROL SULFATE 2.5 MG: 2.5 SOLUTION RESPIRATORY (INHALATION) at 21:37

## 2021-04-30 RX ADMIN — FERROUS SULFATE TAB 325 MG (65 MG ELEMENTAL FE) 325 MG: 325 (65 FE) TAB at 12:34

## 2021-04-30 RX ADMIN — OXYCODONE HYDROCHLORIDE 10 MG: 5 TABLET ORAL at 04:52

## 2021-04-30 RX ADMIN — GABAPENTIN 300 MG: 300 CAPSULE ORAL at 08:39

## 2021-04-30 RX ADMIN — TAMSULOSIN HYDROCHLORIDE 0.4 MG: 0.4 CAPSULE ORAL at 08:38

## 2021-04-30 RX ADMIN — RIFAXIMIN 550 MG: 550 TABLET ORAL at 19:42

## 2021-04-30 RX ADMIN — ACETAMINOPHEN 975 MG: 325 TABLET, FILM COATED ORAL at 19:41

## 2021-04-30 RX ADMIN — RIFAXIMIN 550 MG: 550 TABLET ORAL at 08:39

## 2021-04-30 RX ADMIN — DOCUSATE SODIUM AND SENNOSIDES 1 TABLET: 8.6; 5 TABLET ORAL at 08:38

## 2021-04-30 RX ADMIN — DIAZEPAM 5 MG: 5 TABLET ORAL at 01:40

## 2021-04-30 RX ADMIN — ACETAMINOPHEN 975 MG: 325 TABLET, FILM COATED ORAL at 10:31

## 2021-04-30 RX ADMIN — POTASSIUM CHLORIDE 20 MEQ: 20 TABLET, EXTENDED RELEASE ORAL at 19:42

## 2021-04-30 RX ADMIN — ACETAMINOPHEN 975 MG: 325 TABLET, FILM COATED ORAL at 03:34

## 2021-04-30 RX ADMIN — PANTOPRAZOLE SODIUM 40 MG: 20 TABLET, DELAYED RELEASE ORAL at 08:37

## 2021-04-30 RX ADMIN — OXYCODONE HYDROCHLORIDE 10 MG: 5 TABLET ORAL at 08:35

## 2021-04-30 RX ADMIN — FOLIC ACID 1 MG: 1 TABLET ORAL at 08:38

## 2021-04-30 RX ADMIN — POTASSIUM CHLORIDE 20 MEQ: 20 TABLET, EXTENDED RELEASE ORAL at 08:38
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
## 2021-04-30 NOTE — PROGRESS NOTES
"[facility] [facility] [place]    Patient admitted to room 2302 at approximately 1845 via cart from surgery. Patient was accompanied by nurse.     Verbal SBAR report received from RN prior to patient arrival.     Patient trasferred to bed via air sae. Patient alert and oriented X 3. Pain is controlled with current analgesics.  Medication(s) being used: dilaudid. Admission vital signs: Blood pressure (!) 168/75, pulse 95, temperature 98  F (36.7  C), temperature source Oral, resp. rate 16, height 1.676 m (5' 6\"), weight 84 kg (185 lb 3 oz), SpO2 97 %. Patient was oriented to plan of care, call light, bed controls, tv, telephone, bathroom and visiting hours.     Risk Assessment    The following safety risks were identified during admission: fall. Yellow risk band applied: YES.     Skin Initial Assessment    This writer admitted this patient and completed a full skin assessment and Kartik score in the Adult PCS flowsheet. Appropriate interventions initiated as needed.     Secondary skin check completed by Cris DIA.         Education    Patient has a [place] to Observation order: No  Observation education completed and documented: N/A      Juana Guzman RN    "

## 2021-04-30 NOTE — OP NOTE
Procedure Date: 04/29/2021    PREOPERATIVE DIAGNOSIS:  Unstable left total knee replacement.    POSTOPERATIVE DIAGNOSIS:  Unstable left total knee replacement.    PROCEDURE:  Revision left total knee replacement (femoral component and polyethylene).    SURGEON:  Carlton Jones M.D.    ASSISTANT:  Anabelle Serna PA-C.  The presence of the PA was necessary for positioning, retraction, and safe progression of the case.    ANESTHESIA:  Spinal.    ESTIMATED BLOOD LOSS:  Less than 50 mL.    COMPLICATIONS:  None apparent.    DISPOSITION:  Stable to PACU.    TOURNIQUET TIME:  75 minutes, left lower extremity.    IMPLANTS USED:  New Biomet Vanguard 65 mm PS femoral component with  40 mm x 17 mm press-fit stem with 2.5mm offset and 16 mm PS polyethylene insert.  Retained Biomet Vanguard 67 mm tibial tray.      INDICATIONS FOR PROCEDURE:  Baltazar is a 60-year-old male who underwent a left knee replacement in 03/2012 by Dr. Pérez.  He initially did well.  Over the last few years, he has had significantly worsening left knee pain.  He saw one of my partners in clinic a few years ago.  A bone scan did not show any evidence of loosening.  They simply monitored things.  However, his symptoms were progressing.  I saw him in clinic, and he clearly had anterior subluxation of his tibia and his femur as well as sagittal plane laxity.  Given the overall picture and his progressive pain, we elected to proceed with a revision left knee replacement, understanding risks of ongoing pain, infection, damage to vessels or nerves, and need for further surgery.    DESCRIPTION OF PROCEDURE:  Baltazar was met in the preoperative holding area.  The left knee was marked.  Consent was reviewed.  He was then transferred to the operating theater.  After smooth induction of spinal anesthesia, he was positioned supine with a bump under his left hip.  His leg was secured with an [place]ados leg coronel.  A timeout was performed, verifying correct patient, procedure,  location.  He received preoperative antibiotics.  Left lower extremity was then prepped and draped in standard sterile fashion.    We started by recreating his anterior incision over his knee.  Dissection was sharply carried down through the skin and subcutaneous tissue.  His parapatellar arthrotomy was healed, and this was reopened.  There was no significant fluid in his knee.  There were no signs of purulence.  There was some chalk material consistent with lysis.  We performed a wide synovectomy of the medial and lateral gutter.  The patellar button was not loose and had minimal polyethylene wear.  After this, we removed the locking pin on the tibial tray and then removed the polyethylene insert.  The polyethylene insert was inspected, and surprisingly there was no significant wear posteriorly.  The tibial and femoral components were both evaluated and were both noted to be well-fixed.  However, given his instability, we felt it was best to convert him from a CR to a PS knee.  We therefore used offset osteotomes and removed his previously placed femoral component, with minimal bone loss.  However, we felt we should provide additional fixation to his femur.  We therefore reamed for a 17mm x 40mm press-fit femoral stem.  We then provisionally placed the 4 in 1 cutting block with the offset dial.  As we felt he was loose in flexion preoperatively, we dialed this offset anterior to shift the femoral component posteriorly and close down the flexion gap.  It also became apparent that we need to translate the femoral component a little bit more lateral for better coverage.  Once we had the femoral component where we wanted it, with the dial at 7 in order to shift the femoral component posterolateral, we pinned it in place.  We created our anterior, posterior and chamfer cuts.  It seemed we changed the femoral component rotation a little bit and gave him a little bit more external rotation.  Additionally, his prior  femoral component was quite flexed, and we tried to improve this.  After making our cuts, we placed a trial femoral component.  We tried a variety of different polyethylene thicknesses.  With a 16 mm poly, we were able to get full extension to 130 degrees of flexion with nice stability in the coronal as well as the sagittal plane.  His knee was much more stable throughout a range of motion.  Therefore, we cut for the box through our PS femoral trial.  We then assembled our final components on the back table.  We mixed 1 batch of Simplex cement with a hybrid cementing technique, getting a press-fit in the diaphysis, but cementing the metaphyseal and distal portions of the femoral component.  We placed the femoral component, followed by our final polyethylene insert.  The tourniquet was let down.  Hemostasis was achieved.  The wound was soaked in a dilute Betadine solution.  We closed the capsular layer with #1 Vicryl, followed by a running #1 Stratafix, subcutaneous layer with 2-0 Vicryl, and skin with staples.  Sterile compressive dressing was applied, and he was awoken from anesthesia and transferred to recovery room in stable condition.    POSTOPERATIVE PLAN:    1.  Weightbearing as tolerated on the left lower extremity with PT for mobilization.  2.  Perioperative antibiotics.  3.  DVT prophylaxis:  Aspirin 325 mg daily x 30 days.        Carlton Jones MD        D: 2021   T: 2021   MT: MATTIE    Name:     ALVA MARISCAL  MRN:      [id number]        Account:        [id number]   :      1960           Procedure Date: 2021     Document: [id number]

## 2021-04-30 NOTE — PROGRESS NOTES
Ortho    No acute events overnight.  Significant pain, but controlled with valium, oxycodone and IV dilaudid  Has been up walking to bathroom  States he doesn't drink hard EtOH anymore but has occasional beer    AFVSS  Pleasant, NAD  Nonlabored breathing  Left LE: Dressing cdi.  Fires ehl/fhl/gsc/ta c SILT dp/sp/tib n.  Toes warm    IMPRESSION:   1.  s/p revision left TKA 4.29.21   2. Comorbidities including EtOH abuse    PLAN:   --WBAT left leg.  PT for mobilization   --DVT prophylaxis: ASA 325mg daily x 30d   --Cont oxycodone, valium, prn dialudid for pain control    --Appreciate social working seeing about home care   --Dispo: Likely home tomorrow pending pain control (still requiring IV narcotics), progress with PT    Carlton Jones MD

## 2021-04-30 NOTE — UTILIZATION REVIEW
"  Concomitant stay Status; Secondary Review Determination     Admission Date: 4/29/2021 12:22 PM      Under the authority of the Utilization Management Committee, the utilization review process indicated a secondary review on the above patient.  The review outcome is based on review of the medical records, discussions with staff, and applying clinical experience noted on the date of the review.        ()      Inpatient Status Appropriate - This patient's medical care is consistent with medical management for inpatient care and reasonable inpatient medical practice.      () Observation Status Appropriate - This patient does not meet hospital inpatient criteria and is placed in observation status. If this patient's primary payer is Medicare and was admitted as an inpatient, Condition Code 44 should be used and patient status changed to \"observation\".   (x) Outpatient status appropriate - This patient's medical care is consistent with outpatient status at the hospital.          RATIONALE FOR DETERMINATION   Mr. Matt is a 60-year-old male with a past medical history significant for BPH, tobacco use disorder, alcohol dependence, hypertension, hyperlipidemia, asthma, GERD, chronic kidney disease, and chronic anemia.  He underwent revision of previous left total knee replacement on 4/29/2021.  He was placed in the hospital on outpatient status for expected routine postoperative care.  He has been recovering as expected during hospital stay.  Pain is controlled with oral pain medications only today.  He should be able to discharge from the hospital soon.  Outpatient status at the hospital remains appropriate status for continued postoperative care at this time.      The severity of illness, intensity of service provided, expected LOS and risk for adverse outcome make the care appropriate for outpatient level care at the [facility].        The information on this document is developed by the utilization review team in " order for the business office to ensure compliance.  This only denotes the appropriateness of proper admission status and does not reflect the quality of care rendered.         The definitions of Inpatient Status and Observation Status used in making the determination above are those provided in the CMS Coverage Manual, Chapter 1 and Chapter 6, section 70.4.      Sincerely,     Lyle Vieira D.O.  Utilization Review/ Case Management  [facility].

## 2021-04-30 NOTE — PLAN OF CARE
"Vitally stable;    BP (!) 154/59 (BP Location: Right arm)   Pulse 75   Temp 97.9  F (36.6  C) (Oral)   Resp 18   Ht 1.676 m (5' 6\")   Wt 84 kg (185 lb 3 oz)   SpO2 97%   BMI 29.89 kg/m      Pt reported 8-9/10 surgical pain in left knee. PRN Oxycodone and Valium given with relief. Voiding. No skin issues noted. X1/gb/walker when transferring. PT to evaluate today.    Plan: IV antibiotics, pain control with possible discharge home today.    Ronna Ayala RN on 4/30/2021 at 5:18 AM    "

## 2021-04-30 NOTE — PROGRESS NOTES
[facility]      OUTPATIENT PHYSICAL THERAPY EVALUATION  PLAN OF TREATMENT FOR OUTPATIENT REHABILITATION  (COMPLETE FOR INITIAL CLAIMS ONLY)  Patient's Last Name, First Name, M.I.  YOB: 1960  Abhijeet Mccauley                        Provider's Name  [facility] Medical Record No.  [id number]                               Onset Date:  04/29/21   Start of Care Date:  (P) 04/30/21      Type:     _X_PT   ___OT   ___SLP Medical Diagnosis:  (P) S/p L TKA                        PT Diagnosis:  S/p L TKA   Visits from SOC:  1   _________________________________________________________________________________  Plan of Treatment/Functional Goals    Planned Interventions: balance training, bed mobility training, gait training, home exercise program, transfer training, strengthening, neuromuscular re-education     Goals: See Physical Therapy Goals on Care Plan in [employer] electronic health record.    Therapy Frequency: 2x/day  Predicted Duration of Therapy Intervention: 3 days  _________________________________________________________________________________    I CERTIFY THE NEED FOR THESE SERVICES FURNISHED UNDER        THIS PLAN OF TREATMENT AND WHILE UNDER MY CARE     (Physician co-signature of this document indicates review and certification of the therapy plan).                Certification date from: (P) 04/30/21, Certification date to: (P) 05/04/21    Referring Physician: Dr. Carlton Jones            Initial Assessment        See Physical Therapy evaluation dated (P) 04/30/21 in Epic electronic health record.

## 2021-04-30 NOTE — PLAN OF CARE
Occupational Therapy Discharge Summary    Reason for therapy discharge:    All goals and outcomes met, no further needs identified.    Progress towards therapy goal(s). See goals on Care Plan in [facility] electronic health record for goal details.  Goals met    Therapy recommendation(s):    No further OT is recommended.

## 2021-04-30 NOTE — PLAN OF CARE
[facility]      OUTPATIENT OCCUPATIONAL THERAPY  EVALUATION  PLAN OF TREATMENT FOR OUTPATIENT REHABILITATION  (COMPLETE FOR INITIAL CLAIMS ONLY)  Patient's Last Name, First Name, M.I.  YOB: 1960  Abhijeet Mccauley                          Provider's Name  [facility] Medical Record No.  [id number]                               Onset Date:  04/29/21   Start of Care Date:   4/30/2021     Type:     ___PT   _X_OT   ___SLP Medical Diagnosis:   s/p L knee revision                        OT Diagnosis:  decreased independence with ADLs    Visits from SOC:  1   _________________________________________________________________________________  Plan of Treatment/Functional Goals    Planned Interventions: ADL retraining   Goals: See Occupational Therapy Goals on Care Plan in [facility] electronic health record.    Therapy Frequency: 1x eval and treat  Predicted Duration of Therapy Intervention:    _________________________________________________________________________________    I CERTIFY THE NEED FOR THESE SERVICES FURNISHED UNDER        THIS PLAN OF TREATMENT AND WHILE UNDER MY CARE     (Physician co-signature of this document indicates review and certification of the therapy plan).               ,      Referring Physician: Carlton Jones MD            Initial Assessment        See Occupational Therapy evaluation dated   in Epic electronic health record.

## 2021-04-30 NOTE — PLAN OF CARE
Patient vital signs are at baseline: Yes  Patient able to ambulate as they were prior to admission or with assist devices provided by therapies during their stay:  Yes Pt ambulating with SBA, gait belt and walker. Walking in room and in halls, in chair for meals.  Patient MUST void prior to discharge:  Yes, voiding without difficulty  Patient able to tolerate oral intake:  Yes, taking regular diet  Pain has adequate pain control using Oral analgesics:  Yes

## 2021-04-30 NOTE — PLAN OF CARE
Patient vital signs are at baseline: Yes    Patient able to ambulate as they were prior to admission or with assist devices provided by therapies during their stay:  Yes, ambulated in room into bathroom with walker and sba.      Patient MUST void prior to discharge:  Yes voiding without difficulty.     Patient able to tolerate oral intake:  Yes, good appetite.     Pain has adequate pain control using Oral analgesics:  No,  Reason:  rating pain at 8 - 9/10 consistently.  10mg oxycodone was given and IV dilaudid x 1.

## 2021-04-30 NOTE — PROGRESS NOTES
04/30/21 1100   Quick Adds   Type of Visit Initial Occupational Therapy Evaluation       Present no   Living Environment   People in home alone   Current Living Arrangements apartment   Home Accessibility no concerns   Living Environment Comments walk-in shower at baseline.    Self-Care   Usual Activity Tolerance good   Current Activity Tolerance moderate   Equipment Currently Used at Home wheelchair, power;walker, rolling   Disability/Function   Hearing Difficulty or Deaf yes   Patient's preferred means of communication verbal   Describe hearing loss bilateral hearing loss   Use of hearing assistive devices bilateral hearing aids   Hearing Management wears hearing aides   Wear Glasses or Blind yes   Vision Management glasses   Concentrating, Remembering or Making Decisions Difficulty no   Difficulty Communicating no   Difficulty Eating/Swallowing no   Walking or Climbing Stairs Difficulty yes   Walking or Climbing Stairs ambulation difficulty, requires equipment;stair climbing difficulty, requires equipment   Mobility Management RW   Dressing/Bathing Difficulty no   Toileting issues no   Doing Errands Independently Difficulty (such as shopping) no   Fall history within last six months yes   Number of times patient has fallen within last six months 2   Change in Functional Status Since Onset of Current Illness/Injury no   General Information   Onset of Illness/Injury or Date of Surgery 04/29/21   Referring Physician Carlton Jones MD   Patient/Family Therapy Goal Statement (OT) to return home. Would like to stay until tomorrow.    Additional Occupational Profile Info/Pertinent History of Current Problem s/p L knee revision   Left Lower Extremity (Weight-bearing Status) weight-bearing as tolerated (WBAT)   Cognitive Status Examination   Orientation Status orientation to person, place and time   Pain Assessment   Patient Currently in Pain Yes, see Vital Sign flowsheet   Range of Motion  Comprehensive   Comment, General Range of Motion B UE ROM: WNL   Strength Comprehensive (MMT)   Comment, General Manual Muscle Testing (MMT) Assessment B UE strength: WNL   Transfers   Transfer Comments Modified independent    Balance   Balance Comments steady on feet using RW   Upper Body Dressing Assessment/Training   [place] Level (Upper Body Dressing) independent   Lower Body Dressing Assessment/Training   [place] Level (Lower Body Dressing) minimum assist (75% patient effort)   Toileting   [place] Level (Toileting) independent   Instrumental Activities of Daily Living (IADL)   IADL Comments home RN sets- up medications    Clinical Impression   Criteria for Skilled Therapeutic Interventions Met (OT) yes;skilled treatment is necessary   OT Diagnosis decreased independence with ADLs    OT Problem List-Impairments impacting ADL post-surgical precautions   ADL comments/analysis decreased ROM in L knee affecting LB dressing    Assessment of Occupational Performance 1-3 Performance Deficits   Identified Performance Deficits LB dressing    Planned Therapy Interventions (OT) ADL retraining   Clinical Decision Making Complexity (OT) low complexity   Therapy Frequency (OT) 1x eval and treat   Risk & Benefits of therapy have been explained evaluation/treatment results reviewed;care plan/treatment goals reviewed;risks/benefits reviewed;current/potential barriers reviewed;participants voiced agreement with care plan;participants included;patient   OT Discharge Planning    OT Discharge Recommendation (DC Rec) home   OT Rationale for DC Rec following treatment pt is able to complete ADLs with supervision only.    Total Evaluation Time (Minutes)   Total Evaluation Time (Minutes) 8

## 2021-04-30 NOTE — PROGRESS NOTES
Initial IP Physical Therapy Evaluation     04/30/21 0900   Quick Adds   Quick Adds Certification   Type of Visit Initial PT Evaluation   Living Environment   People in home alone   Current Living Arrangements apartment   Home Accessibility no concerns   Living Environment Comments 1st floor apartment, no stairs to enter. Home fully accessible with walker   Self-Care   Usual Activity Tolerance good   Current Activity Tolerance moderate   Equipment Currently Used at Home wheelchair, power;walker, rolling   Disability/Function   Hearing Difficulty or Deaf yes   Patient's preferred means of communication verbal   Describe hearing loss bilateral hearing loss   Use of hearing assistive devices bilateral hearing aids   Hearing Management wearing hearing aides   Wear Glasses or Blind yes   Vision Management glasses   Concentrating, Remembering or Making Decisions Difficulty no   Difficulty Communicating no   Difficulty Eating/Swallowing no   Walking or Climbing Stairs Difficulty yes   Walking or Climbing Stairs ambulation difficulty, requires equipment;stair climbing difficulty, requires equipment   Mobility Management walker   Dressing/Bathing Difficulty no   Toileting issues no   Doing Errands Independently Difficulty (such as shopping) no   Fall history within last six months yes   Number of times patient has fallen within last six months 2   Change in Functional Status Since Onset of Current Illness/Injury no   General Information   Onset of Illness/Injury or Date of Surgery 04/29/21   Referring Physician Dr. Carlton Jones   Patient/Family Therapy Goals Statement (PT) Get back into martial arts   Pertinent History of Current Problem (include personal factors and/or comorbidities that impact the POC) Patient is 61 y/o s/p L TKA on 4/29/21   Cognition   Orientation Status (Cognition) oriented x 3   Affect/Mental Status (Cognition) WNL   Follows Commands (Cognition) WNL   Pain Assessment   Patient Currently in Pain Yes,  see Vital Sign flowsheet  (Patient states 8/10 at rest but no signs of distress)   Integumentary/Edema   Integumentary/Edema no deficits were identifed   Posture    Posture Not impaired   Range of Motion (ROM)   ROM Comment L knee flexion to 94 degrees   Strength   Strength Comments L quad contraction normal, full extension and can hold for 10 seconds   Bed Mobility   Comment (Bed Mobility) Supine<>sit Ind   Transfers   Transfer Safety Comments Sit<>stand Mod I with FWW   Gait/Stairs (Locomotion)   [place] Level (Gait) supervision   Assistive Device (Gait) walker, 4-wheeled   Distance in Feet (Required for LE Total Joints) 175   Pattern (Gait) step-through   Comment (Gait/Stairs) Patient ambulates with antalgic pattern, able to bear 75% body weight on RLE per patient. Safe ambulating in hallway and pain appears tolerable though patient states 8/10 pain throughout. VC for posture, walker positioning and weight bearing.   Balance   Balance other (describe)   Balance Comments Requires 4WW   Sensory Examination   Sensory Perception WFL   Coordination   Coordination no deficits were identified   Muscle Tone   Muscle Tone no deficits were identified   Clinical Impression   Criteria for Skilled Therapeutic Intervention yes, treatment indicated   PT Diagnosis (PT) S/p L TKA   Influenced by the following impairments Pain, weakness, decreased activity tolerance   Functional limitations due to impairments Mobility, transfers   Clinical Presentation Stable/Uncomplicated   Clinical Presentation Rationale Clinical judgment   Clinical Decision Making (Complexity) low complexity   Therapy Frequency (PT) 2x/day   Predicted Duration of Therapy Intervention (days/wks) 3 days   Planned Therapy Interventions (PT) balance training;bed mobility training;gait training;home exercise program;transfer training;strengthening;neuromuscular re-education   Anticipated Equipment Needs at Discharge (PT) walker, rolling   Risk & Benefits of  therapy have been explained evaluation/treatment results reviewed;care plan/treatment goals reviewed;risks/benefits reviewed;current/potential barriers reviewed;participants voiced agreement with care plan;patient;participants included   PT Discharge Planning    PT Discharge Recommendation (DC Rec) home with outpatient physical therapy   PT Rationale for DC Rec Patient ambulating adequately to discharge home pending pain management. Patient states he does not want PT coming to his house and would rather go to appointments.   PT Brief overview of current status  Mod I transfers and bed mobility, SBA ambulation 175' 4WW   Therapy Certification   Start of care date 04/30/21   Certification date from 04/30/21   Certification date to 05/04/21   Medical Diagnosis S/p L TKA   Total Evaluation Time   Total Evaluation Time (Minutes) 10     Victor Manuel Canchola, PT, DPT

## 2021-04-30 NOTE — PROGRESS NOTES
Patient received second dose of moderna covid vaccine today administered by [facility] RN.

## 2021-04-30 NOTE — PROGRESS NOTES
"[facility] Medicine Progress Note  Date of Service: 04/30/2021    Assessment & Plan   Abhijeet Mccauley is a 60 year old male who presented on 4/29/2021 for scheduled Procedure(s):  REVISION, TOTAL ARTHROPLASTY, KNEE by Carlton Jones MD and is being followed by the hospital medicine service for co-management of acute and/or chronic perioperative medical problems.    S/p Procedure(s):  REVISION, TOTAL ARTHROPLASTY, KNEE   1 Day Post-Op   Hg 8.4. Reporting pain is not well managed though does appear comfortable.    - pain control, wound cares, physical therapy, occupational therapy and DVT prophylaxis per orthopedic surgery service    Acute on Chronic Anemia, iron deficiency  Post-operatively 8.4. Longstanding anemia, baseline recently 6.6-9.8. Iron studies showed deficiency, suspected to have chronic GI bleed. Endoscopy in 2/2020 showed alcoholic gastritis. Previous to that endoscopy 2/2018 showed gastric ulcers, esophagitis. Endoscopy 1/2018 showed grade 2 varices which were banded/eradicated at that time. Denies black stools, occasional specks of bright red blood.  - continue home iron  - follow up with PCP for recheck of hemoglobin 3-5 days after discharge  - consider repeat scopes as outpatient     Alcohol dependence  Alcoholic cirrhosis of liver  History of hepatic encephalopathy  Thrombocytopenia  History of Esophageal varices  Reports drinking 3 beers per day most days. Previously was drinking hard alcohol at higher amounts and had issues with alcohol withdrawal on prior hospitalizations. States he quite \"cold turkey\" recently but goes on to state he is drinking 3 beer daily most days currently with his last drink about a week ago.   No clinical signs of withdrawal thus far.  - continue home folic acid, thiamine   - continue home rifaximin  - recommend follow up with hepatology  - CIWA protocol ordered with prn lorazepam    Hypertension  Chronic. Blood pressures " reviewed, mildly elevated.   - continue home furosemide, spironolactone    CKD (chronic kidney disease) stage 3, GFR 30-59 ml/min  Creatinine 2.04, GFR 34. Recent baseline 1.79-2.2.   - continue outpatient surveillance     Hyperlipidemia  Chronic.  - continue home atorvastatin    Acute myeloid leukemia in remission  S/p chemotherapy.  - continue outpatient surveillance     Mild intermittent asthma without complication  Lungs clear.  - continue home albuterol prn    Peptic ulcer disease  Noted in problem list, previous EGD in 2018 showed gastritis and ulcer.  - continue home pantoprazole      Benign Prostatic Hyperplasia  Chronic.  - continue home finasteride, tamsulosin    Tobacco dependence syndrome 5-6 cigarettes per day. Declines patch.    COVID 19 Vaccination status: Received 1st dose of moderna, due for second dose on 5/4/21. Requesting second dose while hospitalized. May consider giving tomorrow prior to discharge (4 days early) though ideally would wait for scheduled day.    DVT Prophylaxis: as per orthopedic surgery service - aspirin 325 mg daily  Code Status: Full Code    Lines: peripheral   Chong catheter: none    Discussion: Medically, the patient appears to be recovering appropriately since surgery though having some issues with pain required IV narcotics today. Vital signs stable. No medical barriers to discharge at this time.    Disposition: Anticipate discharge likely tomorrow per ortho once pain controlled and mobility goals met     Attestation:  I have reviewed today's vital signs, notes, medications, labs and imaging.  Total time: 15 minutes    Breana Green PA-C   [facility] Medicine    Supervising Physician Dr. Adrian Zamarripa.     Interval History   Patient was seen this morning and is overall doing well today. He has been having some difficulties with pain control, needed IV narcotics. Walking back from bathroom, appears to be moving fairly well, did well in PT. Fair appetite. Passing gas.  no BM. Voiding regularly.    Denies headache, lightheadedness, dizziness, fever, chills, chest pain, palpitations, shortness of breath, cough, abdominal pain, nausea, vomiting, numbness or tingling in bilateral lower extremities.       Physical Exam   Temp:  [97.9  F (36.6  C)-98.8  F (37.1  C)] 98  F (36.7  C)  Pulse:  [69-97] 69  Resp:  [9-22] 19  BP: (138-178)/() 143/68  SpO2:  [92 %-100 %] 96 %    Weights:   Vitals:    04/29/21 1304 04/29/21 1852   Weight: 82.1 kg (181 lb) 84 kg (185 lb 3 oz)    Body mass index is 29.89 kg/m .    General: Appears well, sitting up in chair comfortably. Alert and oriented. Pleasant and cooperative. No distress. Non-toxic. Appears stated age.   CV: Regular rate, normal rhythm. Radial pulses are 2+ bilaterally. Pedal pulses are 2+. No lower extremity edema.  Respiratory: No accessory muscle usage. Clear to auscultation bilaterally. No wheezes, crackles or rhonchi.   GI: Bowel sounds normoactive in all quadrants. Soft, non-tender, non-distended.  Skin: Warm, dry, intact. Did not assess incision, ACE wrap in place.  Musculoskeletal: Muscle tone is appropriate. Moves all extremities freely with limited range of motion left lower extremity due to pain and bandage.  Neuro: Sensation to light touch of bilateral lower extremities is grossly intact.     Data   Recent Labs   Lab 04/30/21  0450 04/29/21  1247 04/27/21  1019   WBC  --   --  8.0   HGB 8.4*  --  9.8*   MCV  --   --  93   PLT  --   --  129*   INR  --  1.39*  --    NA  --   --  137   POTASSIUM  --   --  3.3*   CHLORIDE  --   --  106   CO2  --   --  21   BUN  --   --  19   CR  --   --  2.04*   ANIONGAP  --   --  10   BEE  --   --  9.1   GLC  --   --  105*   ALBUMIN  --   --  3.3*   PROTTOTAL  --   --  7.1   BILITOTAL  --   --  1.1   ALKPHOS  --   --  441*   ALT  --   --  30   AST  --   --  45       Recent Labs   Lab 04/30/21  0217 04/29/21 2203 04/27/21  1019   GLC  --   --  105*   * 225*  --         Unresulted Labs  Ordered in the Past 30 Days of this Admission     No orders found for last 31 day(s).           Imaging  Recent Results (from the past 24 hour(s))   XR Knee Port Left 1/2 Views    Narrative    EXAM: XR KNEE PORT LEFT 1/2 VIEWS  LOCATION: [facility]  DATE/TIME: 4/29/2021 4:45 PM    INDICATION: Evaluate arthroplasty.  COMPARISON: None.      Impression    IMPRESSION: Postoperative changes of left total knee arthroplasty and patellar resurfacing. Components appear well seated. Postprocedural air within the joint and surrounding soft tissues. Surgical skin staples. Vascular calcifications. No fracture.     I reviewed all new labs and imaging results over the last 24 hours. I personally reviewed no images or EKG's today.    Medications     lactated ringers 75 mL/hr at 04/29/21 2142       acetaminophen  975 mg Oral Q8H     aspirin  325 mg Oral Daily     atorvastatin  20 mg Oral Daily     ceFAZolin  2 g Intravenous Q8H     docusate sodium  100 mg Oral BID     finasteride  5 mg Oral Daily     folic acid  1 mg Oral Daily     furosemide  20 mg Oral Daily     gabapentin  300 mg Oral BID     montelukast  10 mg Oral At Bedtime     pantoprazole  40 mg Oral BID     polyethylene glycol  17 g Oral Daily     potassium chloride ER  20 mEq Oral BID     rifaximin  550 mg Oral BID     senna-docusate  1 tablet Oral BID     sodium chloride (PF)  3 mL Intracatheter Q8H     spironolactone  25 mg Oral Daily     tamsulosin  0.4 mg Oral Daily

## 2021-05-01 ENCOUNTER — APPOINTMENT (OUTPATIENT)
Dept: PHYSICAL THERAPY | Facility: CLINIC | Age: 61
End: 2021-05-01
Attending: ORTHOPAEDIC SURGERY
Payer: COMMERCIAL

## 2021-05-01 VITALS
WEIGHT: 185.19 LBS | DIASTOLIC BLOOD PRESSURE: 60 MMHG | HEART RATE: 83 BPM | BODY MASS INDEX: 29.76 KG/M2 | SYSTOLIC BLOOD PRESSURE: 144 MMHG | OXYGEN SATURATION: 97 % | RESPIRATION RATE: 18 BRPM | TEMPERATURE: 97.6 F | HEIGHT: 66 IN

## 2021-05-01 LAB — HGB BLD-MCNC: 8.1 G/DL (ref 13.3–17.7)

## 2021-05-01 PROCEDURE — 250N000013 HC RX MED GY IP 250 OP 250 PS 637: Performed by: ORTHOPAEDIC SURGERY

## 2021-05-01 PROCEDURE — 97116 GAIT TRAINING THERAPY: CPT | Mod: GP | Performed by: PHYSICAL THERAPIST

## 2021-05-01 PROCEDURE — 36415 COLL VENOUS BLD VENIPUNCTURE: CPT | Performed by: ORTHOPAEDIC SURGERY

## 2021-05-01 PROCEDURE — 250N000013 HC RX MED GY IP 250 OP 250 PS 637: Performed by: PHYSICIAN ASSISTANT

## 2021-05-01 PROCEDURE — 85018 HEMOGLOBIN: CPT | Performed by: ORTHOPAEDIC SURGERY

## 2021-05-01 RX ADMIN — FOLIC ACID 1 MG: 1 TABLET ORAL at 07:48

## 2021-05-01 RX ADMIN — GABAPENTIN 300 MG: 300 CAPSULE ORAL at 07:48

## 2021-05-01 RX ADMIN — PANTOPRAZOLE SODIUM 40 MG: 20 TABLET, DELAYED RELEASE ORAL at 07:48

## 2021-05-01 RX ADMIN — OXYCODONE HYDROCHLORIDE 10 MG: 5 TABLET ORAL at 07:49

## 2021-05-01 RX ADMIN — FUROSEMIDE 20 MG: 20 TABLET ORAL at 07:48

## 2021-05-01 RX ADMIN — THIAMINE HCL TAB 100 MG 100 MG: 100 TAB at 07:48

## 2021-05-01 RX ADMIN — ACETAMINOPHEN 975 MG: 325 TABLET, FILM COATED ORAL at 04:14

## 2021-05-01 RX ADMIN — ASPIRIN 325 MG: 325 TABLET, COATED ORAL at 07:49

## 2021-05-01 RX ADMIN — RIFAXIMIN 550 MG: 550 TABLET ORAL at 07:48

## 2021-05-01 RX ADMIN — DOCUSATE SODIUM AND SENNOSIDES 1 TABLET: 8.6; 5 TABLET ORAL at 07:49

## 2021-05-01 RX ADMIN — SPIRONOLACTONE 25 MG: 25 TABLET ORAL at 07:48

## 2021-05-01 RX ADMIN — DOCUSATE SODIUM 100 MG: 100 CAPSULE, LIQUID FILLED ORAL at 07:48

## 2021-05-01 RX ADMIN — HYDROXYZINE HYDROCHLORIDE 25 MG: 25 TABLET, FILM COATED ORAL at 07:49

## 2021-05-01 RX ADMIN — TAMSULOSIN HYDROCHLORIDE 0.4 MG: 0.4 CAPSULE ORAL at 07:48

## 2021-05-01 RX ADMIN — ATORVASTATIN CALCIUM 20 MG: 20 TABLET, FILM COATED ORAL at 07:48

## 2021-05-01 RX ADMIN — POTASSIUM CHLORIDE 20 MEQ: 20 TABLET, EXTENDED RELEASE ORAL at 07:48

## 2021-05-01 RX ADMIN — FINASTERIDE 5 MG: 5 TABLET, FILM COATED ORAL at 07:48

## 2021-05-01 NOTE — PLAN OF CARE
[facility] [facility] DISCHARGE NOTE    Patient discharged to home at 11:50 AM via wheel chair. Accompanied by staff, ride provided by friend. Discharge instructions reviewed with patient, opportunity offered to ask questions. Dressing changed and ace wrap applied by ortho prior to discharge. Prescriptions filled and sent with patient upon discharge. All belongings sent with patient.    Nicky Ragland RN

## 2021-05-01 NOTE — DISCHARGE SUMMARY
[facility] Discharge Summary    Abhijeet Mccauley MRN# [id number]   Age: 60 year old YOB: 1960     Date of Admission:  4/29/2021  Date of Discharge::  5/1/2021  Admitting Physician:  Carlton Jones MD  Discharge Physician:  Kendal Curiel PA-C               Admission Diagnoses:   unstable left TKA          Discharge Diagnosis:   S/p revision left TKA          Procedures:   Procedure(s): Left TKA revision                  Medications Prior to Admission:     Medications Prior to Admission   Medication Sig Dispense Refill Last Dose     ACETAMINOPHEN EXTRA STRENGTH 500 MG tablet TAKE ONE TO TWO TABLETS BY MOUTH EVERY 6 HOURS AS NEEDED FOR MILD PAIN. DO NOT TAKE MORE THAN 3000 MG ACETAMINOPHEN TOTAL IN ONE DAY. 100 tablet 0 4/28/2021 at Unknown time     albuterol (VENTOLIN HFA) 108 (90 Base) MCG/ACT inhaler Inhale 2 puffs into the lungs every 4 hours as needed for shortness of breath / dyspnea or wheezing 18 g 11 Past Week at Unknown time     atorvastatin (LIPITOR) 20 MG tablet TAKE ONE TABLET BY MOUTH ONCE DAILY 90 tablet 0 4/28/2021 at Unknown time     calcium carbonate-vitamin D (OSCAL W/D) 500-200 MG-UNIT tablet Take 1 tablet by mouth 2 times daily 60 tablet 1 4/28/2021 at Unknown time     diclofenac (VOLTAREN) 1 % topical gel PLACE 2 GRAMS ONTO THE SKIN FOUR TIMES A DAY AS NEEDED FOR MODERATE PAIN 100 g 11 Past Month at Unknown time     ferrous sulfate (FEROSUL) 325 (65 Fe) MG tablet Take 1 tablet (325 mg) by mouth every other day 45 tablet 3 4/28/2021 at Unknown time     finasteride (PROSCAR) 5 MG tablet Take 1 tablet (5 mg) by mouth daily 90 tablet 3 4/28/2021 at Unknown time     folic acid (FOLVITE) 1 MG tablet Take 1 tablet (1 mg) by mouth daily 90 tablet 3 4/28/2021 at Unknown time     furosemide (LASIX) 20 MG tablet Take 1 tablet (20 mg) by mouth daily 90 tablet 3 4/28/2021 at Unknown time     gabapentin (NEURONTIN) 300 MG capsule Take 1 capsule (300 mg) by mouth 2 times  daily 60 capsule 3 2021 at Unknown time     loratadine (CLARITIN) 10 MG tablet TAKE ONE TABLET BY MOUTH ONCE DAILY AS NEEDED FOR ALLERGIES 90 tablet 3 2021 at Unknown time     mineral oil-white petrolatum (EUCERIN) CREA cream Apply topically to back at bedtime for xerosis   2021 at Unknown time     montelukast (SINGULAIR) 10 MG tablet TAKE ONE TABLET BY MOUTH AT BEDTIME 90 tablet 0 2021 at Unknown time     order for DME Equipment being ordered: shower chair 1 Device 0      pantoprazole (PROTONIX) 40 MG EC tablet TAKE 1 TABLET (40 MG) BY MOUTH 2 TIMES DAILY 180 tablet 1 2021 at Unknown time     spironolactone (ALDACTONE) 25 MG tablet Take 1 tablet (25 mg) by mouth daily 90 tablet 3 2021 at Unknown time     tamsulosin (FLOMAX) 0.4 MG capsule Take 1 capsule (0.4 mg) by mouth daily 30 capsule 11 2021 at Unknown time     traZODone (DESYREL) 50 MG tablet Take 1 tablet (50 mg) by mouth At Bedtime 90 tablet 3 2021 at Unknown time     vitamin B1 (THIAMINE) 100 MG tablet Take 1 tablet (100 mg) by mouth daily   2021 at Unknown time     XIFAXAN 550 MG TABS tablet TAKE 1 TABLET (550 MG) BY MOUTH 2 TIMES DAILY 180 tablet 3 2021 at Unknown time     order for DME Equipment being ordered: 4 wheel walker 1 Units 0      order for DME Equipment being ordered: bed pull up bar 1 Units 0      [] potassium chloride ER (KLOR-CON M) 20 MEQ CR tablet Take 1 tablet (20 mEq) by mouth 2 times daily for 3 days 6 tablet 0      [DISCONTINUED] acetaminophen (TYLENOL) 325 MG tablet Take 2 tablets (650 mg) by mouth 3 times daily as needed for mild pain Max daily dose 2 grams. Do not order further acetaminophen.        [DISCONTINUED] meloxicam (MOBIC) 7.5 MG tablet Take 1 tablet (7.5 mg) by mouth daily Take with a meal.  Stop taking if any stomach irritation is noticed. 30 tablet 1 2021 at Unknown time             Discharge Medications:     Current Discharge Medication List      START taking  these medications    Details   aspirin (ASA) 325 MG EC tablet Take 1 tablet (325 mg) by mouth daily  Qty: 30 tablet, Refills: 0    Associated Diagnoses: History of revision of total replacement of left knee joint      diazepam (VALIUM) 5 MG tablet Take 1 tablet (5 mg) by mouth every 6 hours as needed for muscle spasms  Qty: 40 tablet, Refills: 0    Associated Diagnoses: History of revision of total replacement of left knee joint      hydrOXYzine (ATARAX) 25 MG tablet Take 1-2 tablets (25-50 mg) by mouth every 6 hours as needed for itching or anxiety (with pain, moderate pain)  Qty: 40 tablet, Refills: 0    Associated Diagnoses: History of revision of total replacement of left knee joint      ibuprofen (ADVIL/MOTRIN) 600 MG tablet Take 1 tablet (600 mg) by mouth every 6 hours as needed for pain (mild)  Qty: 30 tablet, Refills: 0    Associated Diagnoses: History of revision of total replacement of left knee joint      oxyCODONE (ROXICODONE) 5 MG tablet Take 1-2 tablets (5-10 mg) by mouth every 3 hours as needed for pain (Moderate to Severe)  Qty: 40 tablet, Refills: 0    Associated Diagnoses: History of revision of total replacement of left knee joint      senna-docusate (SENOKOT-S/PERICOLACE) 8.6-50 MG tablet Take 1-2 tablets by mouth daily as needed for constipation Take while on oral narcotics to prevent or treat constipation.  Qty: 15 tablet, Refills: 0    Comments: While taking narcotics  Associated Diagnoses: History of revision of total replacement of left knee joint         CONTINUE these medications which have NOT CHANGED    Details   ACETAMINOPHEN EXTRA STRENGTH 500 MG tablet TAKE ONE TO TWO TABLETS BY MOUTH EVERY 6 HOURS AS NEEDED FOR MILD PAIN. DO NOT TAKE MORE THAN 3000 MG ACETAMINOPHEN TOTAL IN ONE DAY.  Qty: 100 tablet, Refills: 0    Associated Diagnoses: Pain in both hands      albuterol (VENTOLIN HFA) 108 (90 Base) MCG/ACT inhaler Inhale 2 puffs into the lungs every 4 hours as needed for shortness of  breath / dyspnea or wheezing  Qty: 18 g, Refills: 11    Comments: Pharmacy may dispense brand covered by insurance (Proair, or proventil or ventolin or generic albuterol inhaler)  Associated Diagnoses: Mild intermittent asthma without complication      atorvastatin (LIPITOR) 20 MG tablet TAKE ONE TABLET BY MOUTH ONCE DAILY  Qty: 90 tablet, Refills: 0    Associated Diagnoses: ASCVD (arteriosclerotic cardiovascular disease)      calcium carbonate-vitamin D (OSCAL W/D) 500-200 MG-UNIT tablet Take 1 tablet by mouth 2 times daily  Qty: 60 tablet, Refills: 1    Associated Diagnoses: Stress fracture of right tibia, initial encounter      diclofenac (VOLTAREN) 1 % topical gel PLACE 2 GRAMS ONTO THE SKIN FOUR TIMES A DAY AS NEEDED FOR MODERATE PAIN  Qty: 100 g, Refills: 11    Associated Diagnoses: Bilateral hand pain      ferrous sulfate (FEROSUL) 325 (65 Fe) MG tablet Take 1 tablet (325 mg) by mouth every other day  Qty: 45 tablet, Refills: 3    Associated Diagnoses: Iron deficiency anemia due to chronic blood loss      finasteride (PROSCAR) 5 MG tablet Take 1 tablet (5 mg) by mouth daily  Qty: 90 tablet, Refills: 3    Associated Diagnoses: Benign prostatic hyperplasia with urinary frequency      folic acid (FOLVITE) 1 MG tablet Take 1 tablet (1 mg) by mouth daily  Qty: 90 tablet, Refills: 3    Associated Diagnoses: Alcohol use disorder, severe, dependence (H)      furosemide (LASIX) 20 MG tablet Take 1 tablet (20 mg) by mouth daily  Qty: 90 tablet, Refills: 3    Associated Diagnoses: Alcoholic cirrhosis of liver with ascites (H)      gabapentin (NEURONTIN) 300 MG capsule Take 1 capsule (300 mg) by mouth 2 times daily  Qty: 60 capsule, Refills: 3    Comments: The patient requests that this prescription be held on file for filling in the near future.  Associated Diagnoses: Osteoarthritis of spine without myelopathy or radiculopathy, sacral and sacrococcygeal region      loratadine (CLARITIN) 10 MG tablet TAKE ONE TABLET BY  MOUTH ONCE DAILY AS NEEDED FOR ALLERGIES  Qty: 90 tablet, Refills: 3    Associated Diagnoses: Seasonal allergic rhinitis due to pollen      mineral oil-white petrolatum (EUCERIN) CREA cream Apply topically to back at bedtime for xerosis      montelukast (SINGULAIR) 10 MG tablet TAKE ONE TABLET BY MOUTH AT BEDTIME  Qty: 90 tablet, Refills: 0    Associated Diagnoses: Seasonal allergic rhinitis due to pollen      !! order for DME Equipment being ordered: shower chair  Qty: 1 Device, Refills: 0    Associated Diagnoses: Acute myeloid leukemia in remission (H)      pantoprazole (PROTONIX) 40 MG EC tablet TAKE 1 TABLET (40 MG) BY MOUTH 2 TIMES DAILY  Qty: 180 tablet, Refills: 1    Associated Diagnoses: Gastroesophageal reflux disease without esophagitis      spironolactone (ALDACTONE) 25 MG tablet Take 1 tablet (25 mg) by mouth daily  Qty: 90 tablet, Refills: 3    Comments: The patient requests that this prescription be held on file for filling in the near future.  Associated Diagnoses: Alcoholic cirrhosis of liver with ascites (H)      tamsulosin (FLOMAX) 0.4 MG capsule Take 1 capsule (0.4 mg) by mouth daily  Qty: 30 capsule, Refills: 11    Associated Diagnoses: Benign localized prostatic hyperplasia with lower urinary tract symptoms (LUTS)      traZODone (DESYREL) 50 MG tablet Take 1 tablet (50 mg) by mouth At Bedtime  Qty: 90 tablet, Refills: 3    Associated Diagnoses: Insomnia, unspecified type      vitamin B1 (THIAMINE) 100 MG tablet Take 1 tablet (100 mg) by mouth daily  Qty:      Associated Diagnoses: Alcohol use disorder, severe, dependence (H)      XIFAXAN 550 MG TABS tablet TAKE 1 TABLET (550 MG) BY MOUTH 2 TIMES DAILY  Qty: 180 tablet, Refills: 3    Associated Diagnoses: Alcohol dependence with alcohol-induced anxiety disorder (H)      !! order for DME Equipment being ordered: 4 wheel walker  Qty: 1 Units, Refills: 0    Associated Diagnoses: Peripheral polyneuropathy; Primary osteoarthritis of left knee      !!  order for DME Equipment being ordered: bed pull up bar  Qty: 1 Units, Refills: 0    Associated Diagnoses: Generalized weakness       !! - Potential duplicate medications found. Please discuss with provider.      STOP taking these medications       potassium chloride ER (KLOR-CON M) 20 MEQ CR tablet Comments:   Reason for Stopping:         meloxicam (MOBIC) 7.5 MG tablet Comments:   Reason for Stopping:                     Consultations:   No consultations were requested during this admission          Brief History of Illness:   Reason for admission requiring a surgical or invasive procedure:   L TKA instability   The patient underwent the following procedure(s):   Revision left TKA   There were no immediate complications during this procedure.    Please refer to the full operative summary for details.             Hospital Course:   The patient's hospital course was unremarkable.  He recovered as anticipated and experienced no post-operative complications. none          Discharge Instructions and Follow-Up:   Discharge diet: Regular   Discharge activity: Activity as tolerated   Discharge follow-up: Follow up with primary care provider in 5-7 days   Wound care: Ice to area for comfort  Leave aquacel dressing in place until follow up. Use ACE wrap for compression over left knee for swelling as needed.            Discharge Disposition:   Discharged to home with home health care       Kendal Curiel PA-C

## 2021-05-01 NOTE — PROGRESS NOTES
Care Management Discharge Note    Discharge Date:  5-1-21     Discharge Disposition: Pt to return home with [facility] Phone: [phone] Fax: [phone] for resumed cares.    Discharge Services: PCA, [facility] Worker    Discharge DME: Walker    Discharge Transportation: family or friend will provide    Private pay costs discussed: Not applicable    PAS Confirmation Code:  NA  Patient/family educated on Medicare website which has current facility and service quality ratings: no    Education Provided on the Discharge Plan:  Yes  Persons Notified of Discharge Plans: Pt and care team  Patient/Family in Agreement with the Plan: yes    Handoff Referral Completed: Yes    Additional Information:  Per IDT rounds today, MD team states that pt is medically stable for discharge today.  Pt will discharge to home with resumption of Home Health Care & PCA services.    SW spoke with pt and he is in agreement with this plan of care.    Transportation previously discussed with pt/family & pt states his friend is on the way.    RUDY Cummings

## 2021-05-01 NOTE — PROGRESS NOTES
"MD Fitzgerald contacted: \"Room 2302 feels like it is hard to breath and has pressure/tightening in his chest. Ordering an EKG. Anything else? He does have CIWA protocal in place, hasn't required any medication. Last CIWA a 4 due to tremors.\"    Lia requests to know if patient hypoxic, reported that the patient is not. MD verbalizes to get EKG and go from there.   "

## 2021-05-01 NOTE — PLAN OF CARE
Patient is alert and oriented. Had 8/10 pain in L knee, gave 10mg oxycodone and scheduled tylenol. Patient stated pain goal of 6 met. CIWA's done every 4 hours, patient did not score more than a 4 due to tremors. CMS is intact. Small amount of dried drainage noted on dressing, marked. Saline locked. Gave PRN nebulizer per patient, O2 was high 90's-100%. Patient had EKG done per MD for chest pain, EKG was normal and patient stated the pain has subsided. Patient has not had a bowel movement since surgery, gave scheduled docusate and senna. No BM on this shift. Pt is passing gas.
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
## 2021-05-01 NOTE — PLAN OF CARE
Physical Therapy Discharge Summary    Reason for therapy discharge:    Discharged to home with home therapy.  All goals and outcomes met, no further needs identified.    Progress towards therapy goal(s). See goals on Care Plan in [facility] electronic health record for goal details.  Goals met    Therapy recommendation(s):    Continued therapy is recommended.  Rationale/Recommendations:  to improve knee ROM, strength and mobility .

## 2021-05-03 ENCOUNTER — PATIENT OUTREACH (OUTPATIENT)
Dept: CARE COORDINATION | Facility: CLINIC | Age: 61
End: 2021-05-03

## 2021-05-04 ENCOUNTER — PATIENT OUTREACH (OUTPATIENT)
Dept: NURSING | Facility: CLINIC | Age: 61
End: 2021-05-04
Payer: COMMERCIAL

## 2021-05-04 ENCOUNTER — HOSPITAL ENCOUNTER (EMERGENCY)
Facility: CLINIC | Age: 61
Discharge: HOME | End: 2021-05-04
Attending: NURSE PRACTITIONER | Admitting: NURSE PRACTITIONER
Payer: COMMERCIAL

## 2021-05-04 VITALS
DIASTOLIC BLOOD PRESSURE: 76 MMHG | RESPIRATION RATE: 18 BRPM | OXYGEN SATURATION: 98 % | SYSTOLIC BLOOD PRESSURE: 152 MMHG | HEART RATE: 84 BPM | BODY MASS INDEX: 28.93 KG/M2 | TEMPERATURE: 98.4 F | HEIGHT: 66 IN | WEIGHT: 180 LBS

## 2021-05-04 DIAGNOSIS — Z48.89 ENCOUNTER FOR POSTOPERATIVE WOUND CARE: ICD-10-CM

## 2021-05-04 PROCEDURE — 99283 EMERGENCY DEPT VISIT LOW MDM: CPT | Performed by: NURSE PRACTITIONER

## 2021-05-04 PROCEDURE — 99282 EMERGENCY DEPT VISIT SF MDM: CPT | Performed by: NURSE PRACTITIONER

## 2021-05-04 ASSESSMENT — MIFFLIN-ST. JEOR: SCORE: 1559.22

## 2021-05-04 ASSESSMENT — ENCOUNTER SYMPTOMS
JOINT SWELLING: 1
WOUND: 1
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
## 2021-05-04 NOTE — PROGRESS NOTES
Clinic Care Coordination Contact  Community Health Worker Initial Outreach    Patient accepts CC: Yes. Patient scheduled for assessment with RN CC on 5/5 at 12:30pm. Patient noted desire to discuss other support and resources available as patient currently has resumption of home care and PCA services.  Patient stated he missed his scheduled appt today as he was unaware of it and needs at least 48 hour notice as he has physical difficulty getting to clinic visits.     The Clinic Community Health Worker spoke with the patient today to discuss possible Clinic Care Coordination enrollment. The service was described to the patient and immediate needs were discussed. The patient agreed to enrollment and an assessment was scheduled. The patient was provided with contact information for the clinic CHW.              Assessment date: 5/5  @12:30pm    Laura DISLA  Community Health Worker  [facility]  Clinic Care Coordination  [facility]  [email or web address]   Office: [phone]

## 2021-05-04 NOTE — PROGRESS NOTES
Clinic Care Coordination Contact  [facility]/Voicemail    Clinical Data: Care Coordination Outreach  Outreach attempted x 1.  Left message on patient's voicemail with call back information and requested return call.  Plan: CHW will try to reach patient again in 1-2 business days.  Note: Revision PAULA DISLA  Community Health Worker  [facility] Care Coordination  [facility]  [email or web address]   Office: [phone]

## 2021-05-05 ENCOUNTER — TELEPHONE (OUTPATIENT)
Dept: FAMILY MEDICINE | Facility: CLINIC | Age: 61
End: 2021-05-05

## 2021-05-05 NOTE — DISCHARGE INSTRUCTIONS
Follow up with Dr. Marrero on Friday at 1:00 pm for wound re-evaluation and post-operative evaluation

## 2021-05-05 NOTE — ED NOTES
Pt unable to find ride home.   [facility] calling for a cab at this time.   Pt updated.

## 2021-05-07 ENCOUNTER — TELEPHONE (OUTPATIENT)
Dept: FAMILY MEDICINE | Facility: CLINIC | Age: 61
End: 2021-05-07

## 2021-05-07 NOTE — TELEPHONE ENCOUNTER
Reason for call:    Symptom or request:     Patient called stating he is need homecare. He had knee  surgery. The bandage is bothering him.      Best Time:  any    Can we leave a detailed message on this number?  YES     Karlee NAILS  Station

## 2021-05-07 NOTE — TELEPHONE ENCOUNTER
"S-(situation): Warm call passed to this RN from [facility]    B-(background): -    A-(assessment):   \"I had to go to emergency because the bandage was falling off.\"   He needs help in the shower, needs area re-bandaged.  Asked if he has been referred to a home care agency, and he said yes. Asked if he called them, he said no. Let him know I can't answer his question about when home care is coming out to see him. He called the clinic instead of home care because \"this is the first number I called.\"  Referral from 4/30/21:  'Your provider has referred you to: [facility] Phone: [phone] Fax: [phone]'    R-(recommendations): He was advised to call his home care agency. This RN called [facility] Phone: [phone]; he is an Established pt of [facility]. The nurse went out to see him 5/4/21.   Spoke with Dominique and let her know pt is looking for their assistance with a shower and his wraps, and to please follow up.     Angelina HDZ, RN, BSN      "

## 2021-05-11 ENCOUNTER — APPOINTMENT (OUTPATIENT)
Dept: ULTRASOUND IMAGING | Facility: CLINIC | Age: 61
End: 2021-05-11
Attending: FAMILY MEDICINE
Payer: COMMERCIAL

## 2021-05-11 ENCOUNTER — HOSPITAL ENCOUNTER (EMERGENCY)
Facility: CLINIC | Age: 61
Discharge: HOME | End: 2021-05-11
Attending: FAMILY MEDICINE | Admitting: FAMILY MEDICINE
Payer: COMMERCIAL

## 2021-05-11 ENCOUNTER — OFFICE VISIT (OUTPATIENT)
Dept: URGENT CARE | Facility: URGENT CARE | Age: 61
End: 2021-05-11
Payer: COMMERCIAL

## 2021-05-11 ENCOUNTER — APPOINTMENT (OUTPATIENT)
Dept: GENERAL RADIOLOGY | Facility: CLINIC | Age: 61
End: 2021-05-11
Attending: FAMILY MEDICINE
Payer: COMMERCIAL

## 2021-05-11 VITALS
BODY MASS INDEX: 28.51 KG/M2 | HEIGHT: 66 IN | SYSTOLIC BLOOD PRESSURE: 144 MMHG | OXYGEN SATURATION: 97 % | TEMPERATURE: 98 F | RESPIRATION RATE: 18 BRPM | DIASTOLIC BLOOD PRESSURE: 64 MMHG | HEART RATE: 88 BPM | WEIGHT: 177.4 LBS

## 2021-05-11 VITALS
BODY MASS INDEX: 28.57 KG/M2 | TEMPERATURE: 98.6 F | WEIGHT: 177 LBS | HEART RATE: 88 BPM | SYSTOLIC BLOOD PRESSURE: 123 MMHG | RESPIRATION RATE: 18 BRPM | OXYGEN SATURATION: 96 % | DIASTOLIC BLOOD PRESSURE: 73 MMHG

## 2021-05-11 DIAGNOSIS — M79.89 PAIN AND SWELLING OF LEFT LOWER EXTREMITY: Primary | ICD-10-CM

## 2021-05-11 DIAGNOSIS — M79.605 PAIN AND SWELLING OF LEFT LOWER EXTREMITY: Primary | ICD-10-CM

## 2021-05-11 DIAGNOSIS — M25.462 PAIN AND SWELLING OF LEFT KNEE: ICD-10-CM

## 2021-05-11 DIAGNOSIS — M25.562 PAIN AND SWELLING OF LEFT KNEE: ICD-10-CM

## 2021-05-11 DIAGNOSIS — Z96.652 STATUS POST TOTAL LEFT KNEE REPLACEMENT: ICD-10-CM

## 2021-05-11 DIAGNOSIS — R40.4 SEDATED DUE TO MEDICATION: ICD-10-CM

## 2021-05-11 DIAGNOSIS — Z96.652 S/P TOTAL KNEE ARTHROPLASTY, LEFT: ICD-10-CM

## 2021-05-11 DIAGNOSIS — T50.905A SEDATED DUE TO MEDICATION: ICD-10-CM

## 2021-05-11 LAB
ALBUMIN SERPL-MCNC: 3.4 G/DL (ref 3.4–5)
ALP SERPL-CCNC: 592 U/L (ref 40–150)
ALT SERPL W P-5'-P-CCNC: 23 U/L (ref 0–70)
AMMONIA PLAS-SCNC: 32 UMOL/L (ref 10–50)
ANION GAP SERPL CALCULATED.3IONS-SCNC: 7 MMOL/L (ref 3–14)
AST SERPL W P-5'-P-CCNC: 42 U/L (ref 0–45)
BASOPHILS # BLD AUTO: 0.1 10E9/L (ref 0–0.2)
BASOPHILS NFR BLD AUTO: 0.8 %
BILIRUB SERPL-MCNC: 1.2 MG/DL (ref 0.2–1.3)
BUN SERPL-MCNC: 19 MG/DL (ref 7–30)
CALCIUM SERPL-MCNC: 9.6 MG/DL (ref 8.5–10.1)
CHLORIDE SERPL-SCNC: 105 MMOL/L (ref 94–109)
CO2 SERPL-SCNC: 28 MMOL/L (ref 20–32)
CREAT SERPL-MCNC: 1.88 MG/DL (ref 0.66–1.25)
CRP SERPL-MCNC: 65.2 MG/L (ref 0–8)
DIFFERENTIAL METHOD BLD: ABNORMAL
EOSINOPHIL # BLD AUTO: 0.4 10E9/L (ref 0–0.7)
EOSINOPHIL NFR BLD AUTO: 3.4 %
ERYTHROCYTE [DISTWIDTH] IN BLOOD BY AUTOMATED COUNT: 20.5 % (ref 10–15)
ERYTHROCYTE [SEDIMENTATION RATE] IN BLOOD BY WESTERGREN METHOD: 39 MM/H (ref 0–20)
ETHANOL SERPL-MCNC: <0.01 G/DL
GFR SERPL CREATININE-BSD FRML MDRD: 37 ML/MIN/{1.73_M2}
GLUCOSE SERPL-MCNC: 93 MG/DL (ref 70–99)
HCT VFR BLD AUTO: 31.5 % (ref 40–53)
HGB BLD-MCNC: 9.2 G/DL (ref 13.3–17.7)
IMM GRANULOCYTES # BLD: 0.1 10E9/L (ref 0–0.4)
IMM GRANULOCYTES NFR BLD: 0.7 %
INR PPP: 1.39 (ref 0.86–1.14)
LYMPHOCYTES # BLD AUTO: 0.9 10E9/L (ref 0.8–5.3)
LYMPHOCYTES NFR BLD AUTO: 7.7 %
MCH RBC QN AUTO: 27.5 PG (ref 26.5–33)
MCHC RBC AUTO-ENTMCNC: 29.2 G/DL (ref 31.5–36.5)
MCV RBC AUTO: 94 FL (ref 78–100)
MONOCYTES # BLD AUTO: 1.8 10E9/L (ref 0–1.3)
MONOCYTES NFR BLD AUTO: 15.4 %
NEUTROPHILS # BLD AUTO: 8.2 10E9/L (ref 1.6–8.3)
NEUTROPHILS NFR BLD AUTO: 72 %
NRBC # BLD AUTO: 0 10*3/UL
NRBC BLD AUTO-RTO: 0 /100
PLATELET # BLD AUTO: 165 10E9/L (ref 150–450)
POTASSIUM SERPL-SCNC: 3.1 MMOL/L (ref 3.4–5.3)
PROT SERPL-MCNC: 7.2 G/DL (ref 6.8–8.8)
RBC # BLD AUTO: 3.34 10E12/L (ref 4.4–5.9)
SODIUM SERPL-SCNC: 140 MMOL/L (ref 133–144)
WBC # BLD AUTO: 11.4 10E9/L (ref 4–11)

## 2021-05-11 PROCEDURE — 99284 EMERGENCY DEPT VISIT MOD MDM: CPT | Performed by: FAMILY MEDICINE

## 2021-05-11 PROCEDURE — 82077 ASSAY SPEC XCP UR&BREATH IA: CPT | Performed by: FAMILY MEDICINE

## 2021-05-11 PROCEDURE — 85025 COMPLETE CBC W/AUTO DIFF WBC: CPT | Performed by: FAMILY MEDICINE

## 2021-05-11 PROCEDURE — 82140 ASSAY OF AMMONIA: CPT | Performed by: FAMILY MEDICINE

## 2021-05-11 PROCEDURE — 99285 EMERGENCY DEPT VISIT HI MDM: CPT | Mod: 25 | Performed by: FAMILY MEDICINE

## 2021-05-11 PROCEDURE — 86140 C-REACTIVE PROTEIN: CPT | Performed by: FAMILY MEDICINE

## 2021-05-11 PROCEDURE — 85610 PROTHROMBIN TIME: CPT | Performed by: FAMILY MEDICINE

## 2021-05-11 PROCEDURE — 93971 EXTREMITY STUDY: CPT | Mod: LT

## 2021-05-11 PROCEDURE — 73560 X-RAY EXAM OF KNEE 1 OR 2: CPT | Mod: LT

## 2021-05-11 PROCEDURE — 99215 OFFICE O/P EST HI 40 MIN: CPT | Performed by: PHYSICIAN ASSISTANT

## 2021-05-11 PROCEDURE — 80053 COMPREHEN METABOLIC PANEL: CPT | Performed by: FAMILY MEDICINE

## 2021-05-11 PROCEDURE — 85652 RBC SED RATE AUTOMATED: CPT | Performed by: FAMILY MEDICINE

## 2021-05-11 ASSESSMENT — ENCOUNTER SYMPTOMS
EYE REDNESS: 0
CHILLS: 0
SHORTNESS OF BREATH: 0
EYE PAIN: 0
DIARRHEA: 0
EYE ITCHING: 0
FEVER: 0
VOMITING: 0
PALPITATIONS: 0
BACK PAIN: 0
CONSTIPATION: 0
EYE DISCHARGE: 0
SINUS PAIN: 0
COUGH: 0
RHINORRHEA: 0
SORE THROAT: 0
NAUSEA: 0
MYALGIAS: 0
ABDOMINAL PAIN: 0
SINUS PRESSURE: 0
ARTHRALGIAS: 0
WHEEZING: 0

## 2021-05-11 ASSESSMENT — PAIN SCALES - GENERAL: PAINLEVEL: EXTREME PAIN (9)

## 2021-05-11 ASSESSMENT — MIFFLIN-ST. JEOR: SCORE: 1547.43

## 2021-05-11 NOTE — ED NOTES
Pt c/o increased lt leg pain and swelling worse   No soa.  No cp.  Pitting edema from knee to foot.  Leg warm to touch and red.  Pt has hx of recent knee replacement.

## 2021-05-11 NOTE — PROGRESS NOTES
"Ortho    60M with a history of liver disease presents to ED from  in [place] for evaluation of left leg.  Underwent revision left TKA 4.29.21 (femoral component for instability) and discharged home.  Noticed increased swelling and erythema in left lower leg and went to  this am and subsequently referred to ER.  Overall feeling pretty good -- walking and moving well.  No fevers or chills.  Taking oxycodone and valium - which are working well but may be causing changes in mental status.  Hasn't had any falls. Continues to drink \"a couple\" beers/day.    EXAM:  Pleasant, oriented x 3  Walks with smooth gait with a walker  Left knee incision well healed without drainage  Staples intact with mild reactive erythema around staples proximally  No signs of diffuse erythema around knee  Knee motion 0-90 and smooth with minimal pain  Pitting edema from foot to mid calf    Xrays: Revision left TKA without subsidence or loosening  US: No evidence of DVT  CRP 68    IMPRESSION:   1. Reactive erythema left knee staples and expected edema s/p revision TKA 4.29.21   2. Comorbidities including history of liver failure    PLAN:   1. Findings discussed with Dr. Peña in ED.  Given his knee appearance, his walking ability and smooth motion, low suspicion for deep infection.  Think the erythema proximally is related to staples and not cellulitis.  At this point, will cont to monitor.  Recommend elevation and compression from swelling.   2. OK to discharge from ortho standpoint if medically stable.  Follow up with me in clinic as scheduled in 1-2 days    Carlton Jones MD      "

## 2021-05-11 NOTE — PROGRESS NOTES
"    Assessment & Plan     Pain and swelling of left lower extremity  Patient has left lower leg swelling and tenderness following left knee replacement. Would recommend he be seen in the ED to rule out DVT. Patient agrees with plan and will go by private car. Discussed case with ED and they are expecting his arrival.     Status post total left knee replacement                 Tobacco Cessation:   reports that he has been smoking cigarettes. He has a 15.00 pack-year smoking history. He has never used smokeless tobacco.      BMI:   Estimated body mass index is 28.63 kg/m  as calculated from the following:    Height as of this encounter: 1.676 m (5' 6\").    Weight as of this encounter: 80.5 kg (177 lb 6.4 oz).           No follow-ups on file.    OLIMPIA Soto [facility]    Subjective   Chief Complaint   Patient presents with     Infection     4/29/2021 Patient had left knee surgery, left knee is infected, red and warm to the touch, left foot is swollen, painful.         HPI     Derm problem    Onset of symptoms was 1 day(s) ago.  Course of illness is same.    Severity moderate  Current and Associated symptoms: redness around incision of left knee and left lower calf swelling, redness, warmth and tenderness  Treatment measures tried include None tried.  Predisposing factors include knee surgery 12 days ago. History of MRSA. Patient reports history of blood clot about 15 years ago.                 Review of Systems   Constitutional: Negative for chills and fever.   HENT: Negative for congestion, ear pain, rhinorrhea, sinus pressure, sinus pain and sore throat.    Eyes: Negative for pain, discharge, redness and itching.   Respiratory: Negative for cough, shortness of breath and wheezing.    Cardiovascular: Negative for chest pain and palpitations.   Gastrointestinal: Negative for abdominal pain, constipation, diarrhea, nausea and vomiting.   Musculoskeletal: Negative for " "arthralgias, back pain and myalgias.        Skin infection   Skin: Negative for rash.            Objective    BP (!) 144/64 (BP Location: Right arm, Patient Position: Sitting, Cuff Size: Adult Regular)   Pulse 88   Temp 98  F (36.7  C) (Tympanic)   Resp 18   Ht 1.676 m (5' 6\")   Wt 80.5 kg (177 lb 6.4 oz)   SpO2 97%   BMI 28.63 kg/m    Body mass index is 28.63 kg/m .  Physical Exam  Constitutional:       General: He is not in acute distress.     Appearance: He is well-developed.   HENT:      Head: Normocephalic and atraumatic.      Right Ear: Tympanic membrane and ear canal normal.      Left Ear: Tympanic membrane and ear canal normal.   Eyes:      Conjunctiva/sclera: Conjunctivae normal.      Pupils: Pupils are equal, round, and reactive to light.   Cardiovascular:      Rate and Rhythm: Normal rate and regular rhythm.   Pulmonary:      Effort: Pulmonary effort is normal.      Breath sounds: Normal breath sounds.   Musculoskeletal:      Comments: Left knee- staples in place from recent surgery. There is mild superficial redness of skin. No drainage/discharge.     Left calf has swelling, mild redness, and moderate tenderness with palpation.    Skin:     General: Skin is warm and dry.      Findings: No rash.   Psychiatric:         Behavior: Behavior normal.                        "
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
## 2021-05-11 NOTE — DISCHARGE INSTRUCTIONS
Begin to reduce your Percocet medication.  Begin using half of the mouth that you are currently using and in 3 days cut by one half or discontinue lately.  Stop using diazepam completely.  Do not use alcohol, especially with your narcotic medication.  Follow up as planned with Dr. Jones.  To the emergency department if you develop any new concerning symptoms such as increased knee pain, difficulty walking, fevers, or other worrisome symptoms.

## 2021-05-18 ENCOUNTER — APPOINTMENT (OUTPATIENT)
Dept: GENERAL RADIOLOGY | Facility: CLINIC | Age: 61
End: 2021-05-18
Attending: EMERGENCY MEDICINE
Payer: COMMERCIAL

## 2021-05-18 ENCOUNTER — HOSPITAL ENCOUNTER (OUTPATIENT)
Facility: CLINIC | Age: 61
Setting detail: OBSERVATION
Discharge: HOME HEALTH | End: 2021-05-20
Attending: EMERGENCY MEDICINE | Admitting: INTERNAL MEDICINE
Payer: COMMERCIAL

## 2021-05-18 DIAGNOSIS — M25.562 LEFT KNEE PAIN, UNSPECIFIED CHRONICITY: ICD-10-CM

## 2021-05-18 DIAGNOSIS — M70.42 PREPATELLAR BURSITIS OF LEFT KNEE: ICD-10-CM

## 2021-05-18 DIAGNOSIS — M79.89 SWELLING OF LIMB: ICD-10-CM

## 2021-05-18 DIAGNOSIS — Z96.652 S/P TOTAL KNEE ARTHROPLASTY, LEFT: ICD-10-CM

## 2021-05-18 DIAGNOSIS — Z11.52 ENCOUNTER FOR SCREENING LABORATORY TESTING FOR SEVERE ACUTE RESPIRATORY SYNDROME CORONAVIRUS 2 (SARS-COV-2): ICD-10-CM

## 2021-05-18 DIAGNOSIS — L03.116 CELLULITIS OF LEFT LOWER EXTREMITY: ICD-10-CM

## 2021-05-18 PROBLEM — K76.82 HEPATIC ENCEPHALOPATHY (H): Status: RESOLVED | Noted: 2020-07-15 | Resolved: 2021-05-18

## 2021-05-18 PROBLEM — J45.20 MILD INTERMITTENT ASTHMA WITHOUT COMPLICATION: Chronic | Status: ACTIVE | Noted: 2017-11-13

## 2021-05-18 PROBLEM — N39.0 UTI (URINARY TRACT INFECTION): Status: RESOLVED | Noted: 2020-07-17 | Resolved: 2021-05-18

## 2021-05-18 PROBLEM — H90.3 SENSORINEURAL HEARING LOSS, ASYMMETRICAL: Chronic | Status: ACTIVE | Noted: 2017-03-28

## 2021-05-18 PROBLEM — I85.00 ESOPHAGEAL VARICES (H): Chronic | Status: ACTIVE | Noted: 2020-02-27

## 2021-05-18 PROBLEM — E87.6 HYPOKALEMIA: Status: ACTIVE | Noted: 2021-05-18

## 2021-05-18 PROBLEM — Z20.822 COVID-19 RULED OUT: Status: ACTIVE | Noted: 2021-05-18

## 2021-05-18 PROBLEM — R15.9 INCONTINENT OF FECES: Status: RESOLVED | Noted: 2020-09-25 | Resolved: 2021-05-18

## 2021-05-18 PROBLEM — K27.9 PEPTIC ULCER DISEASE: Chronic | Status: ACTIVE | Noted: 2018-03-16

## 2021-05-18 PROBLEM — N18.30 CKD (CHRONIC KIDNEY DISEASE) STAGE 3, GFR 30-59 ML/MIN (H): Chronic | Status: ACTIVE | Noted: 2017-08-16

## 2021-05-18 PROBLEM — E87.20 LACTIC ACIDOSIS: Status: ACTIVE | Noted: 2021-05-18

## 2021-05-18 PROBLEM — N39.0 URINARY TRACT INFECTION: Status: RESOLVED | Noted: 2020-07-17 | Resolved: 2021-05-18

## 2021-05-18 PROBLEM — F17.200 TOBACCO DEPENDENCE SYNDROME: Chronic | Status: ACTIVE | Noted: 2018-03-16

## 2021-05-18 PROBLEM — D12.6 TUBULAR ADENOMA OF COLON: Chronic | Status: ACTIVE | Noted: 2018-03-23

## 2021-05-18 PROBLEM — R53.1 GENERALIZED WEAKNESS: Chronic | Status: ACTIVE | Noted: 2018-05-17

## 2021-05-18 PROBLEM — F10.20 ALCOHOL DEPENDENCE (H): Chronic | Status: ACTIVE | Noted: 2018-03-16

## 2021-05-18 PROBLEM — R29.6 RECURRENT FALLS: Chronic | Status: ACTIVE | Noted: 2020-07-15

## 2021-05-18 LAB
ALBUMIN SERPL-MCNC: 3.1 G/DL (ref 3.4–5)
ALP SERPL-CCNC: 511 U/L (ref 40–150)
ALT SERPL W P-5'-P-CCNC: 20 U/L (ref 0–70)
ANION GAP SERPL CALCULATED.3IONS-SCNC: 8 MMOL/L (ref 3–14)
AST SERPL W P-5'-P-CCNC: 36 U/L (ref 0–45)
BASOPHILS # BLD AUTO: 0.1 10E9/L (ref 0–0.2)
BASOPHILS NFR BLD AUTO: 0.7 %
BILIRUB SERPL-MCNC: 1 MG/DL (ref 0.2–1.3)
BUN SERPL-MCNC: 16 MG/DL (ref 7–30)
CALCIUM SERPL-MCNC: 8.8 MG/DL (ref 8.5–10.1)
CHLORIDE SERPL-SCNC: 108 MMOL/L (ref 94–109)
CO2 SERPL-SCNC: 26 MMOL/L (ref 20–32)
CREAT SERPL-MCNC: 1.87 MG/DL (ref 0.66–1.25)
CRP SERPL-MCNC: 71.8 MG/L (ref 0–8)
DIFFERENTIAL METHOD BLD: ABNORMAL
EOSINOPHIL # BLD AUTO: 0.5 10E9/L (ref 0–0.7)
EOSINOPHIL NFR BLD AUTO: 4.4 %
ERYTHROCYTE [DISTWIDTH] IN BLOOD BY AUTOMATED COUNT: 21.4 % (ref 10–15)
ERYTHROCYTE [SEDIMENTATION RATE] IN BLOOD BY WESTERGREN METHOD: 29 MM/H (ref 0–20)
ETHANOL SERPL-MCNC: 0.25 G/DL
GFR SERPL CREATININE-BSD FRML MDRD: 38 ML/MIN/{1.73_M2}
GLUCOSE SERPL-MCNC: 135 MG/DL (ref 70–99)
HCT VFR BLD AUTO: 30 % (ref 40–53)
HGB BLD-MCNC: 8.9 G/DL (ref 13.3–17.7)
IMM GRANULOCYTES # BLD: 0.1 10E9/L (ref 0–0.4)
IMM GRANULOCYTES NFR BLD: 0.8 %
INR PPP: 1.39 (ref 0.86–1.14)
LABORATORY COMMENT REPORT: NORMAL
LACTATE BLD-SCNC: 1.9 MMOL/L (ref 0.7–2)
LACTATE BLD-SCNC: 2.5 MMOL/L (ref 0.7–2)
LYMPHOCYTES # BLD AUTO: 1.5 10E9/L (ref 0.8–5.3)
LYMPHOCYTES NFR BLD AUTO: 13.3 %
MAGNESIUM SERPL-MCNC: 2 MG/DL (ref 1.6–2.3)
MCH RBC QN AUTO: 28.4 PG (ref 26.5–33)
MCHC RBC AUTO-ENTMCNC: 29.7 G/DL (ref 31.5–36.5)
MCV RBC AUTO: 96 FL (ref 78–100)
MONOCYTES # BLD AUTO: 1.6 10E9/L (ref 0–1.3)
MONOCYTES NFR BLD AUTO: 14 %
MRSA DNA SPEC QL NAA+PROBE: NEGATIVE
NEUTROPHILS # BLD AUTO: 7.6 10E9/L (ref 1.6–8.3)
NEUTROPHILS NFR BLD AUTO: 66.8 %
NRBC # BLD AUTO: 0 10*3/UL
NRBC BLD AUTO-RTO: 0 /100
PHOSPHATE SERPL-MCNC: 2.2 MG/DL (ref 2.5–4.5)
PLATELET # BLD AUTO: 162 10E9/L (ref 150–450)
POTASSIUM SERPL-SCNC: 3 MMOL/L (ref 3.4–5.3)
POTASSIUM SERPL-SCNC: 4.2 MMOL/L (ref 3.4–5.3)
PROT SERPL-MCNC: 6.6 G/DL (ref 6.8–8.8)
RBC # BLD AUTO: 3.13 10E12/L (ref 4.4–5.9)
SARS-COV-2 RNA RESP QL NAA+PROBE: NEGATIVE
SODIUM SERPL-SCNC: 142 MMOL/L (ref 133–144)
SPECIMEN SOURCE: NORMAL
SPECIMEN SOURCE: NORMAL
WBC # BLD AUTO: 11.4 10E9/L (ref 4–11)

## 2021-05-18 PROCEDURE — 85652 RBC SED RATE AUTOMATED: CPT | Performed by: EMERGENCY MEDICINE

## 2021-05-18 PROCEDURE — 87040 BLOOD CULTURE FOR BACTERIA: CPT | Performed by: EMERGENCY MEDICINE

## 2021-05-18 PROCEDURE — 80053 COMPREHEN METABOLIC PANEL: CPT | Performed by: EMERGENCY MEDICINE

## 2021-05-18 PROCEDURE — G0378 HOSPITAL OBSERVATION PER HR: HCPCS

## 2021-05-18 PROCEDURE — 73562 X-RAY EXAM OF KNEE 3: CPT | Mod: LT

## 2021-05-18 PROCEDURE — 84132 ASSAY OF SERUM POTASSIUM: CPT | Performed by: INTERNAL MEDICINE

## 2021-05-18 PROCEDURE — 85025 COMPLETE CBC W/AUTO DIFF WBC: CPT | Performed by: EMERGENCY MEDICINE

## 2021-05-18 PROCEDURE — 83735 ASSAY OF MAGNESIUM: CPT | Performed by: PHYSICIAN ASSISTANT

## 2021-05-18 PROCEDURE — 96365 THER/PROPH/DIAG IV INF INIT: CPT | Performed by: EMERGENCY MEDICINE

## 2021-05-18 PROCEDURE — 84100 ASSAY OF PHOSPHORUS: CPT | Performed by: PHYSICIAN ASSISTANT

## 2021-05-18 PROCEDURE — 87640 STAPH A DNA AMP PROBE: CPT | Performed by: PHYSICIAN ASSISTANT

## 2021-05-18 PROCEDURE — 250N000011 HC RX IP 250 OP 636: Performed by: FAMILY MEDICINE

## 2021-05-18 PROCEDURE — C9803 HOPD COVID-19 SPEC COLLECT: HCPCS | Performed by: EMERGENCY MEDICINE

## 2021-05-18 PROCEDURE — 83605 ASSAY OF LACTIC ACID: CPT | Performed by: FAMILY MEDICINE

## 2021-05-18 PROCEDURE — 85610 PROTHROMBIN TIME: CPT | Performed by: PHYSICIAN ASSISTANT

## 2021-05-18 PROCEDURE — 83605 ASSAY OF LACTIC ACID: CPT | Performed by: EMERGENCY MEDICINE

## 2021-05-18 PROCEDURE — 99219 PR INITIAL OBSERVATION CARE,LEVEL II: CPT | Performed by: INTERNAL MEDICINE

## 2021-05-18 PROCEDURE — 87635 SARS-COV-2 COVID-19 AMP PRB: CPT | Performed by: FAMILY MEDICINE

## 2021-05-18 PROCEDURE — 86140 C-REACTIVE PROTEIN: CPT | Performed by: EMERGENCY MEDICINE

## 2021-05-18 PROCEDURE — 36415 COLL VENOUS BLD VENIPUNCTURE: CPT | Performed by: PHYSICIAN ASSISTANT

## 2021-05-18 PROCEDURE — 250N000011 HC RX IP 250 OP 636: Performed by: INTERNAL MEDICINE

## 2021-05-18 PROCEDURE — 250N000013 HC RX MED GY IP 250 OP 250 PS 637: Performed by: PHYSICIAN ASSISTANT

## 2021-05-18 PROCEDURE — 99207 PR APP CREDIT; MD BILLING SHARED VISIT: CPT | Performed by: PHYSICIAN ASSISTANT

## 2021-05-18 PROCEDURE — 87641 MR-STAPH DNA AMP PROBE: CPT | Performed by: PHYSICIAN ASSISTANT

## 2021-05-18 PROCEDURE — 82077 ASSAY SPEC XCP UR&BREATH IA: CPT | Performed by: EMERGENCY MEDICINE

## 2021-05-18 PROCEDURE — 250N000013 HC RX MED GY IP 250 OP 250 PS 637: Performed by: INTERNAL MEDICINE

## 2021-05-18 PROCEDURE — 99285 EMERGENCY DEPT VISIT HI MDM: CPT | Mod: 25 | Performed by: EMERGENCY MEDICINE

## 2021-05-18 PROCEDURE — 96376 TX/PRO/DX INJ SAME DRUG ADON: CPT

## 2021-05-18 PROCEDURE — 96375 TX/PRO/DX INJ NEW DRUG ADDON: CPT

## 2021-05-18 PROCEDURE — 258N000003 HC RX IP 258 OP 636: Performed by: FAMILY MEDICINE

## 2021-05-18 PROCEDURE — 96361 HYDRATE IV INFUSION ADD-ON: CPT | Performed by: EMERGENCY MEDICINE

## 2021-05-18 PROCEDURE — 36415 COLL VENOUS BLD VENIPUNCTURE: CPT | Performed by: INTERNAL MEDICINE

## 2021-05-18 PROCEDURE — 99285 EMERGENCY DEPT VISIT HI MDM: CPT | Performed by: EMERGENCY MEDICINE

## 2021-05-18 RX ORDER — ACETAMINOPHEN 325 MG/1
650 TABLET ORAL EVERY 4 HOURS PRN
Status: DISCONTINUED | OUTPATIENT
Start: 2021-05-18 | End: 2021-05-20 | Stop reason: HOSPADM

## 2021-05-18 RX ORDER — FLUMAZENIL 0.1 MG/ML
0.2 INJECTION, SOLUTION INTRAVENOUS
Status: DISCONTINUED | OUTPATIENT
Start: 2021-05-18 | End: 2021-05-20 | Stop reason: HOSPADM

## 2021-05-18 RX ORDER — TRAZODONE HYDROCHLORIDE 50 MG/1
50 TABLET, FILM COATED ORAL
Status: DISCONTINUED | OUTPATIENT
Start: 2021-05-18 | End: 2021-05-20 | Stop reason: HOSPADM

## 2021-05-18 RX ORDER — ACETAMINOPHEN 650 MG/1
650 SUPPOSITORY RECTAL EVERY 4 HOURS PRN
Status: DISCONTINUED | OUTPATIENT
Start: 2021-05-18 | End: 2021-05-20 | Stop reason: HOSPADM

## 2021-05-18 RX ORDER — MONTELUKAST SODIUM 10 MG/1
10 TABLET ORAL AT BEDTIME
Status: DISCONTINUED | OUTPATIENT
Start: 2021-05-18 | End: 2021-05-20 | Stop reason: HOSPADM

## 2021-05-18 RX ORDER — FINASTERIDE 5 MG/1
5 TABLET, FILM COATED ORAL DAILY
Status: DISCONTINUED | OUTPATIENT
Start: 2021-05-19 | End: 2021-05-20 | Stop reason: HOSPADM

## 2021-05-18 RX ORDER — POTASSIUM CHLORIDE 1500 MG/1
20 TABLET, EXTENDED RELEASE ORAL ONCE
Status: COMPLETED | OUTPATIENT
Start: 2021-05-18 | End: 2021-05-18

## 2021-05-18 RX ORDER — POLYETHYLENE GLYCOL 3350 17 G/17G
17 POWDER, FOR SOLUTION ORAL DAILY PRN
Status: DISCONTINUED | OUTPATIENT
Start: 2021-05-18 | End: 2021-05-20 | Stop reason: HOSPADM

## 2021-05-18 RX ORDER — NALOXONE HYDROCHLORIDE 0.4 MG/ML
0.2 INJECTION, SOLUTION INTRAMUSCULAR; INTRAVENOUS; SUBCUTANEOUS
Status: DISCONTINUED | OUTPATIENT
Start: 2021-05-18 | End: 2021-05-20 | Stop reason: HOSPADM

## 2021-05-18 RX ORDER — MULTIPLE VITAMINS W/ MINERALS TAB 9MG-400MCG
1 TAB ORAL DAILY
Status: DISCONTINUED | OUTPATIENT
Start: 2021-05-18 | End: 2021-05-20 | Stop reason: HOSPADM

## 2021-05-18 RX ORDER — TRAZODONE HYDROCHLORIDE 50 MG/1
50 TABLET, FILM COATED ORAL AT BEDTIME
Status: DISCONTINUED | OUTPATIENT
Start: 2021-05-18 | End: 2021-05-18

## 2021-05-18 RX ORDER — AMOXICILLIN 250 MG
2 CAPSULE ORAL 2 TIMES DAILY PRN
Status: DISCONTINUED | OUTPATIENT
Start: 2021-05-18 | End: 2021-05-20 | Stop reason: HOSPADM

## 2021-05-18 RX ORDER — GABAPENTIN 300 MG/1
900 CAPSULE ORAL EVERY 8 HOURS
Status: DISCONTINUED | OUTPATIENT
Start: 2021-05-18 | End: 2021-05-20 | Stop reason: HOSPADM

## 2021-05-18 RX ORDER — NALOXONE HYDROCHLORIDE 0.4 MG/ML
0.4 INJECTION, SOLUTION INTRAMUSCULAR; INTRAVENOUS; SUBCUTANEOUS
Status: DISCONTINUED | OUTPATIENT
Start: 2021-05-18 | End: 2021-05-20 | Stop reason: HOSPADM

## 2021-05-18 RX ORDER — ATORVASTATIN CALCIUM 20 MG/1
20 TABLET, FILM COATED ORAL EVERY EVENING
Status: DISCONTINUED | OUTPATIENT
Start: 2021-05-18 | End: 2021-05-20 | Stop reason: HOSPADM

## 2021-05-18 RX ORDER — HALOPERIDOL 5 MG/ML
2.5-5 INJECTION INTRAMUSCULAR EVERY 6 HOURS PRN
Status: DISCONTINUED | OUTPATIENT
Start: 2021-05-18 | End: 2021-05-20 | Stop reason: HOSPADM

## 2021-05-18 RX ORDER — LORAZEPAM 2 MG/ML
1-2 INJECTION INTRAMUSCULAR EVERY 30 MIN PRN
Status: DISCONTINUED | OUTPATIENT
Start: 2021-05-18 | End: 2021-05-20 | Stop reason: HOSPADM

## 2021-05-18 RX ORDER — ONDANSETRON 4 MG/1
4 TABLET, ORALLY DISINTEGRATING ORAL EVERY 6 HOURS PRN
Status: DISCONTINUED | OUTPATIENT
Start: 2021-05-18 | End: 2021-05-20 | Stop reason: HOSPADM

## 2021-05-18 RX ORDER — POTASSIUM CHLORIDE 1500 MG/1
40 TABLET, EXTENDED RELEASE ORAL ONCE
Status: COMPLETED | OUTPATIENT
Start: 2021-05-18 | End: 2021-05-18

## 2021-05-18 RX ORDER — SPIRONOLACTONE 25 MG/1
25 TABLET ORAL DAILY
Status: DISCONTINUED | OUTPATIENT
Start: 2021-05-18 | End: 2021-05-20 | Stop reason: HOSPADM

## 2021-05-18 RX ORDER — DIAZEPAM 5 MG
5 TABLET ORAL EVERY 6 HOURS PRN
Status: DISCONTINUED | OUTPATIENT
Start: 2021-05-18 | End: 2021-05-18

## 2021-05-18 RX ORDER — GABAPENTIN 100 MG/1
100 CAPSULE ORAL EVERY 8 HOURS
Status: DISCONTINUED | OUTPATIENT
Start: 2021-05-25 | End: 2021-05-20 | Stop reason: HOSPADM

## 2021-05-18 RX ORDER — CLONIDINE HYDROCHLORIDE 0.1 MG/1
0.1 TABLET ORAL EVERY 8 HOURS
Status: DISCONTINUED | OUTPATIENT
Start: 2021-05-18 | End: 2021-05-20 | Stop reason: HOSPADM

## 2021-05-18 RX ORDER — LANOLIN ALCOHOL/MO/W.PET/CERES
100 CREAM (GRAM) TOPICAL DAILY
Status: DISCONTINUED | OUTPATIENT
Start: 2021-05-18 | End: 2021-05-18

## 2021-05-18 RX ORDER — FOLIC ACID 1 MG/1
1 TABLET ORAL DAILY
Status: DISCONTINUED | OUTPATIENT
Start: 2021-05-18 | End: 2021-05-18

## 2021-05-18 RX ORDER — ONDANSETRON 2 MG/ML
4 INJECTION INTRAMUSCULAR; INTRAVENOUS EVERY 6 HOURS PRN
Status: DISCONTINUED | OUTPATIENT
Start: 2021-05-18 | End: 2021-05-20 | Stop reason: HOSPADM

## 2021-05-18 RX ORDER — VANCOMYCIN HYDROCHLORIDE 1 G/200ML
1000 INJECTION, SOLUTION INTRAVENOUS EVERY 24 HOURS
Status: DISCONTINUED | OUTPATIENT
Start: 2021-05-18 | End: 2021-05-19

## 2021-05-18 RX ORDER — LANOLIN ALCOHOL/MO/W.PET/CERES
200 CREAM (GRAM) TOPICAL 3 TIMES DAILY
Status: COMPLETED | OUTPATIENT
Start: 2021-05-18 | End: 2021-05-20

## 2021-05-18 RX ORDER — LORAZEPAM 1 MG/1
1-2 TABLET ORAL EVERY 30 MIN PRN
Status: DISCONTINUED | OUTPATIENT
Start: 2021-05-18 | End: 2021-05-20 | Stop reason: HOSPADM

## 2021-05-18 RX ORDER — AMOXICILLIN 250 MG
1 CAPSULE ORAL 2 TIMES DAILY PRN
Status: DISCONTINUED | OUTPATIENT
Start: 2021-05-18 | End: 2021-05-20 | Stop reason: HOSPADM

## 2021-05-18 RX ORDER — FERROUS SULFATE 325(65) MG
325 TABLET ORAL EVERY OTHER DAY
Status: DISCONTINUED | OUTPATIENT
Start: 2021-05-19 | End: 2021-05-20 | Stop reason: HOSPADM

## 2021-05-18 RX ORDER — OLANZAPINE 5 MG/1
5-10 TABLET, ORALLY DISINTEGRATING ORAL EVERY 6 HOURS PRN
Status: DISCONTINUED | OUTPATIENT
Start: 2021-05-18 | End: 2021-05-20 | Stop reason: HOSPADM

## 2021-05-18 RX ORDER — OXYCODONE HYDROCHLORIDE 5 MG/1
5-10 TABLET ORAL
Status: DISCONTINUED | OUTPATIENT
Start: 2021-05-18 | End: 2021-05-20 | Stop reason: HOSPADM

## 2021-05-18 RX ORDER — FUROSEMIDE 20 MG
20 TABLET ORAL DAILY
Status: DISCONTINUED | OUTPATIENT
Start: 2021-05-18 | End: 2021-05-20 | Stop reason: HOSPADM

## 2021-05-18 RX ORDER — FOLIC ACID 1 MG/1
1 TABLET ORAL DAILY
Status: DISCONTINUED | OUTPATIENT
Start: 2021-05-18 | End: 2021-05-20 | Stop reason: HOSPADM

## 2021-05-18 RX ORDER — CEFAZOLIN SODIUM 1 G/50ML
1 INJECTION, SOLUTION INTRAVENOUS EVERY 8 HOURS
Status: DISCONTINUED | OUTPATIENT
Start: 2021-05-18 | End: 2021-05-20 | Stop reason: HOSPADM

## 2021-05-18 RX ORDER — GABAPENTIN 300 MG/1
600 CAPSULE ORAL EVERY 8 HOURS
Status: DISCONTINUED | OUTPATIENT
Start: 2021-05-21 | End: 2021-05-20 | Stop reason: HOSPADM

## 2021-05-18 RX ORDER — GABAPENTIN 300 MG/1
300 CAPSULE ORAL EVERY 8 HOURS
Status: DISCONTINUED | OUTPATIENT
Start: 2021-05-23 | End: 2021-05-20 | Stop reason: HOSPADM

## 2021-05-18 RX ORDER — TAMSULOSIN HYDROCHLORIDE 0.4 MG/1
0.4 CAPSULE ORAL DAILY
Status: DISCONTINUED | OUTPATIENT
Start: 2021-05-19 | End: 2021-05-20 | Stop reason: HOSPADM

## 2021-05-18 RX ORDER — LANOLIN ALCOHOL/MO/W.PET/CERES
100 CREAM (GRAM) TOPICAL 3 TIMES DAILY
Status: DISCONTINUED | OUTPATIENT
Start: 2021-05-20 | End: 2021-05-20 | Stop reason: HOSPADM

## 2021-05-18 RX ORDER — ASPIRIN 325 MG
325 TABLET ORAL DAILY
Status: DISCONTINUED | OUTPATIENT
Start: 2021-05-19 | End: 2021-05-20 | Stop reason: HOSPADM

## 2021-05-18 RX ORDER — LANOLIN ALCOHOL/MO/W.PET/CERES
100 CREAM (GRAM) TOPICAL DAILY
Status: DISCONTINUED | OUTPATIENT
Start: 2021-05-26 | End: 2021-05-20 | Stop reason: HOSPADM

## 2021-05-18 RX ORDER — GABAPENTIN 600 MG/1
1200 TABLET ORAL ONCE
Status: COMPLETED | OUTPATIENT
Start: 2021-05-18 | End: 2021-05-18

## 2021-05-18 RX ADMIN — ATORVASTATIN CALCIUM 20 MG: 20 TABLET, FILM COATED ORAL at 17:45

## 2021-05-18 RX ADMIN — FOLIC ACID 1 MG: 1 TABLET ORAL at 15:33

## 2021-05-18 RX ADMIN — CEFAZOLIN SODIUM 1 G: 1 INJECTION, SOLUTION INTRAVENOUS at 11:18

## 2021-05-18 RX ADMIN — THIAMINE HCL TAB 100 MG 200 MG: 100 TAB at 20:01

## 2021-05-18 RX ADMIN — GABAPENTIN 900 MG: 300 CAPSULE ORAL at 22:12

## 2021-05-18 RX ADMIN — MONTELUKAST 10 MG: 10 TABLET, FILM COATED ORAL at 22:13

## 2021-05-18 RX ADMIN — POTASSIUM & SODIUM PHOSPHATES POWDER PACK 280-160-250 MG 1 PACKET: 280-160-250 PACK at 17:15

## 2021-05-18 RX ADMIN — CLONIDINE HYDROCHLORIDE 0.1 MG: 0.1 TABLET ORAL at 23:57

## 2021-05-18 RX ADMIN — POTASSIUM CHLORIDE 20 MEQ: 20 TABLET, EXTENDED RELEASE ORAL at 17:45

## 2021-05-18 RX ADMIN — OXYCODONE HYDROCHLORIDE 5 MG: 5 TABLET ORAL at 15:40

## 2021-05-18 RX ADMIN — SODIUM CHLORIDE, POTASSIUM CHLORIDE, SODIUM LACTATE AND CALCIUM CHLORIDE 1000 ML: 600; 310; 30; 20 INJECTION, SOLUTION INTRAVENOUS at 12:00

## 2021-05-18 RX ADMIN — ACETAMINOPHEN 650 MG: 325 TABLET, FILM COATED ORAL at 15:40

## 2021-05-18 RX ADMIN — GABAPENTIN 1200 MG: 600 TABLET, FILM COATED ORAL at 15:34

## 2021-05-18 RX ADMIN — CEFAZOLIN SODIUM 1 G: 1 INJECTION, SOLUTION INTRAVENOUS at 20:01

## 2021-05-18 RX ADMIN — THIAMINE HCL TAB 100 MG 200 MG: 100 TAB at 15:34

## 2021-05-18 RX ADMIN — CLONIDINE HYDROCHLORIDE 0.1 MG: 0.1 TABLET ORAL at 15:34

## 2021-05-18 RX ADMIN — POTASSIUM CHLORIDE 40 MEQ: 20 TABLET, EXTENDED RELEASE ORAL at 15:33

## 2021-05-18 RX ADMIN — MULTIPLE VITAMINS W/ MINERALS TAB 1 TABLET: TAB at 15:34

## 2021-05-18 RX ADMIN — POTASSIUM & SODIUM PHOSPHATES POWDER PACK 280-160-250 MG 1 PACKET: 280-160-250 PACK at 20:01

## 2021-05-18 RX ADMIN — VANCOMYCIN HYDROCHLORIDE 1000 MG: 1 INJECTION, SOLUTION INTRAVENOUS at 16:25

## 2021-05-18 RX ADMIN — Medication 1 TABLET: at 20:01

## 2021-05-18 ASSESSMENT — ENCOUNTER SYMPTOMS
HEADACHES: 0
FEVER: 0
COUGH: 0
NUMBNESS: 0
FATIGUE: 0
COLOR CHANGE: 1
NAUSEA: 0
ABDOMINAL PAIN: 0
SHORTNESS OF BREATH: 0
CHEST TIGHTNESS: 0
BACK PAIN: 0
CONFUSION: 0
NECK PAIN: 0
SPEECH DIFFICULTY: 1
CHILLS: 0
WEAKNESS: 0
VOMITING: 0
APPETITE CHANGE: 0
NECK STIFFNESS: 0

## 2021-05-18 ASSESSMENT — MIFFLIN-ST. JEOR: SCORE: 1525.75

## 2021-05-18 NOTE — ED TRIAGE NOTES
Pt had left knee replacement 4 days ago. Reports falling at least 3 times at home. Left knee and lower leg are warm to touch with redness and swelling.

## 2021-05-18 NOTE — PLAN OF CARE
"[facility] ADMISSION NOTE    Patient admitted to room 2309 at approximately 1350 via cart from emergency room. Patient was accompanied by transport tech.     Verbal SBAR report received from [facility] [place] prior to patient arrival.     Patient ambulated to bed with one assist. Patient alert and oriented X 2. Pain is controlled without any medications.  . Admission vital signs: Blood pressure 123/61, pulse 87, temperature 98.4  F (36.9  C), temperature source Oral, resp. rate 18, height 1.676 m (5' 6\"), weight 78.3 kg (172 lb 9.9 oz), SpO2 98 %. Patient was oriented to plan of care, call light, bed controls, tv, telephone, bathroom and visiting hours.     Risk Assessment    The following safety risks were identified during admission: fall. Yellow risk band applied: YES.     Skin Initial Assessment    This writer admitted this patient and completed a full skin assessment and Kartik score in the Adult PCS flowsheet. Appropriate interventions initiated as needed.     Secondary skin check completed by [facility] [place].    Kartik Risk Assessment  Sensory Perception: 3-->slightly limited  Moisture: 3-->occasionally moist  Activity: 3-->walks occasionally  Mobility: 3-->slightly limited  Nutrition: 3-->adequate  Friction and Shear: 3-->no apparent problem  Kartik Score: 18  Mattress: Standard Hospital Mattress (Foam)  Bed Frame: Standard width and length    Education    Patient has a [place] to Observation order: Yes  Observation education completed and documented: Yes      Renita Pierce RN    "

## 2021-05-18 NOTE — ED NOTES
Patient has  [place] to Observation  order. Patient has been given Patient Bill of Rights, Observation brochure and  What does Observation mean to me  forms.  Patient has been given the opportunity to ask questions about observation status and their plan of care.  Breana Anderson RN

## 2021-05-18 NOTE — CONSULTS
ORTHO CONSULT    REASON FOR CONSULTATION: Dr. Peña requested orthopaedic consultation regarding left leg pain    HPI:   Abhijeet is a 60M with a history of liver disease presents who was admitted from ED early this morning for left leg cellulitis..  Underwent revision left TKA 4.29.21 (femoral component for instability) and discharged home.  Noticed increased swelling and erythema in left lower leg last week and went to  this am and subsequently referred to ER.  Discharge home from ER.  Was unable to make post op appointment.  Admits to drinking rum and cokes last night and was falling and therefore came in this morning.  In ED, BAL 0.24 and CRP 71.8.  Has systemically felt well without fevers or chills.  Walking around fairly well.    PMH:    Liver failure    CLL in remission    COPD    Meds: Reviewed.  Notable for valium and oxycodone    Soc History: Lives alone in apt.  Gets care and has PCA.  History of EtOH abuse and continues to drink  Family History: Negative for bleeding or clotting disorders    ROS: 10 pt ROS obtained and negative     EXAM:  Pleasant, smells of EtOH  Easily agitated but calms down quickly  Walks with smooth gait with a walker  Left knee incision well healed without drainage  Staples intact with mild reactive erythema around staples proximally, actually less than last week  No signs of diffuse erythema around knee  Knee motion 0-100 and smooth with minimal pain  Pitting edema from foot to mid calf with overying erythema     CRP 71     IMPRESSION:   1. Left lower leg swelling and possible cellultis  s/p revision TKA 4.29.21   2. Comorbidities including history of liver failure     PLAN:   1. Findings discussed with Dr. Peña in ED over the phone.  This is Abhijeet's third visit to ED in last two weeks, clearly having hard time caring for himself at home.  Don't suspect deep infection in his knee given his motion, lack of pain, and incision.  Will admit to medicine service for obs and treatment  of lower leg cellulitis.     2. Cont PT working on knee motion, gait training   3. DVT prophylaxis: Cont ASA 325mg daily   4. Staples removed today.  Follow up inj clinic in 1 months     Carlton Jones MD
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
## 2021-05-18 NOTE — PHARMACY-VANCOMYCIN DOSING SERVICE
Pharmacy Vancomycin Initial Note  Date of Service May 18, 2021  Patient's  1960  60 year old, male    Indication: Skin and Soft Tissue Infection    Current estimated CrCl = Estimated Creatinine Clearance: 40.3 mL/min (A) (based on SCr of 1.87 mg/dL (H)).    Creatinine for last 3 days  2021:  6:16 AM Creatinine 1.87 mg/dL    Recent Vancomycin Level(s) for last 3 days  No results found for requested labs within last 72 hours.      Vancomycin IV Administrations (past 72 hours)      No vancomycin orders with administrations in past 72 hours.                Nephrotoxins and other renal medications (From now, onward)    Start     Dose/Rate Route Frequency Ordered Stop    21 1530  vancomycin (VANCOCIN) 1000 mg in dextrose 5% 200 mL PREMIX      1,000 mg  200 mL/hr over 1 Hours Intravenous EVERY 24 HOURS 21 1509      21 1430  [Held by provider]  furosemide (LASIX) tablet 20 mg     (Held by provider since 2021 at 1442 by Esthela Burger PA-C.Hold Reason: Other.)    20 mg Oral DAILY 21 1428            Contrast Orders - past 72 hours (72h ago, onward)    None      Loading dose: N/A  Regimen: 1000 mg every 24 hours for 5 doses.  Start time: 15:10 on 2021  Exposure target: AUC24 (range)400-600 mg/L.hr   AUC24,ss: 438 mg/L.hr  PAUC*: 61 %  Ctrough,ss: 13.8 mg/L  Pconc*: 13 %  Tox.: 9 %      Plan:  1. Start vancomycin  1000 mg IV q24h.   2. Vancomycin monitoring method: Trough (Method 1 = dosing nomogram)  3. Vancomycin therapeutic monitoring goal: 10-15 mg/L (AUC= 400-600 mg/l*hr)  4. Pharmacy will check vancomycin levels as appropriate in 1-3 Days.    5. Serum creatinine levels will be ordered daily for the first week of therapy and at least twice weekly for subsequent weeks.      Lillian Meléndez [facility]

## 2021-05-18 NOTE — H&P
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   ALBUMIN 3.1* 3.4   PROTTOTAL 6.6* 7.2   BILITOTAL 1.0 1.2   ALKPHOS 511* 592*   ALT 20 23   AST 36 42       Recent Results (from the past 24 hour(s))   XR Knee Left 3 Views    Narrative    XR KNEE LEFT 3 VIEWS  5/18/2021 6:51 AM     HISTORY: pain, post-op infection concern    COMPARISON: 5/11/2021 and 4/29/2021      Impression    IMPRESSION:  TKA with patellar resurfacing and long stem femoral  component. Skin staples remain in place. Anterior soft tissue  swelling. The soft tissue swelling has increased in the interval. The  knee joint effusion has decreased. No fractures are evident. No areas  of focal osteolysis are evident. Normal patellar alignment.     BEAU VILLA MD

## 2021-05-19 ENCOUNTER — APPOINTMENT (OUTPATIENT)
Dept: PHYSICAL THERAPY | Facility: CLINIC | Age: 61
End: 2021-05-19
Payer: COMMERCIAL

## 2021-05-19 ENCOUNTER — APPOINTMENT (OUTPATIENT)
Dept: OCCUPATIONAL THERAPY | Facility: CLINIC | Age: 61
End: 2021-05-19
Payer: COMMERCIAL

## 2021-05-19 LAB
ANION GAP SERPL CALCULATED.3IONS-SCNC: 3 MMOL/L (ref 3–14)
BUN SERPL-MCNC: 17 MG/DL (ref 7–30)
CALCIUM SERPL-MCNC: 8.7 MG/DL (ref 8.5–10.1)
CHLORIDE SERPL-SCNC: 108 MMOL/L (ref 94–109)
CO2 SERPL-SCNC: 29 MMOL/L (ref 20–32)
CREAT SERPL-MCNC: 1.73 MG/DL (ref 0.66–1.25)
ERYTHROCYTE [DISTWIDTH] IN BLOOD BY AUTOMATED COUNT: 21.7 % (ref 10–15)
GFR SERPL CREATININE-BSD FRML MDRD: 41 ML/MIN/{1.73_M2}
GLUCOSE SERPL-MCNC: 94 MG/DL (ref 70–99)
HCT VFR BLD AUTO: 31.6 % (ref 40–53)
HGB BLD-MCNC: 9.4 G/DL (ref 13.3–17.7)
MCH RBC QN AUTO: 28.5 PG (ref 26.5–33)
MCHC RBC AUTO-ENTMCNC: 29.7 G/DL (ref 31.5–36.5)
MCV RBC AUTO: 96 FL (ref 78–100)
PLATELET # BLD AUTO: 142 10E9/L (ref 150–450)
POTASSIUM SERPL-SCNC: 3.8 MMOL/L (ref 3.4–5.3)
RBC # BLD AUTO: 3.3 10E12/L (ref 4.4–5.9)
SODIUM SERPL-SCNC: 140 MMOL/L (ref 133–144)
WBC # BLD AUTO: 9.5 10E9/L (ref 4–11)

## 2021-05-19 PROCEDURE — 96376 TX/PRO/DX INJ SAME DRUG ADON: CPT

## 2021-05-19 PROCEDURE — 99225 PR SUBSEQUENT OBSERVATION CARE,LEVEL II: CPT | Performed by: INTERNAL MEDICINE

## 2021-05-19 PROCEDURE — 97161 PT EVAL LOW COMPLEX 20 MIN: CPT | Mod: GP

## 2021-05-19 PROCEDURE — 97116 GAIT TRAINING THERAPY: CPT | Mod: GP

## 2021-05-19 PROCEDURE — G0378 HOSPITAL OBSERVATION PER HR: HCPCS

## 2021-05-19 PROCEDURE — 80048 BASIC METABOLIC PNL TOTAL CA: CPT | Performed by: PHYSICIAN ASSISTANT

## 2021-05-19 PROCEDURE — 250N000013 HC RX MED GY IP 250 OP 250 PS 637: Performed by: PHYSICIAN ASSISTANT

## 2021-05-19 PROCEDURE — 99207 PR CDG-CODE CATEGORY CHANGED: CPT | Performed by: INTERNAL MEDICINE

## 2021-05-19 PROCEDURE — 250N000013 HC RX MED GY IP 250 OP 250 PS 637: Performed by: INTERNAL MEDICINE

## 2021-05-19 PROCEDURE — 250N000011 HC RX IP 250 OP 636: Performed by: FAMILY MEDICINE

## 2021-05-19 PROCEDURE — 97165 OT EVAL LOW COMPLEX 30 MIN: CPT | Mod: GO

## 2021-05-19 PROCEDURE — 85027 COMPLETE CBC AUTOMATED: CPT | Performed by: PHYSICIAN ASSISTANT

## 2021-05-19 PROCEDURE — 97535 SELF CARE MNGMENT TRAINING: CPT | Mod: GO

## 2021-05-19 RX ADMIN — GABAPENTIN 900 MG: 300 CAPSULE ORAL at 22:43

## 2021-05-19 RX ADMIN — THIAMINE HCL TAB 100 MG 200 MG: 100 TAB at 09:55

## 2021-05-19 RX ADMIN — Medication 1 TABLET: at 19:57

## 2021-05-19 RX ADMIN — CLONIDINE HYDROCHLORIDE 0.1 MG: 0.1 TABLET ORAL at 09:56

## 2021-05-19 RX ADMIN — CEFAZOLIN SODIUM 1 G: 1 INJECTION, SOLUTION INTRAVENOUS at 19:59

## 2021-05-19 RX ADMIN — FOLIC ACID 1 MG: 1 TABLET ORAL at 09:55

## 2021-05-19 RX ADMIN — GABAPENTIN 900 MG: 300 CAPSULE ORAL at 14:19

## 2021-05-19 RX ADMIN — Medication 1 TABLET: at 09:56

## 2021-05-19 RX ADMIN — THIAMINE HCL TAB 100 MG 200 MG: 100 TAB at 14:19

## 2021-05-19 RX ADMIN — GABAPENTIN 900 MG: 300 CAPSULE ORAL at 07:00

## 2021-05-19 RX ADMIN — TAMSULOSIN HYDROCHLORIDE 0.4 MG: 0.4 CAPSULE ORAL at 09:56

## 2021-05-19 RX ADMIN — ASPIRIN 325 MG ORAL TABLET 325 MG: 325 PILL ORAL at 09:55

## 2021-05-19 RX ADMIN — POTASSIUM & SODIUM PHOSPHATES POWDER PACK 280-160-250 MG 1 PACKET: 280-160-250 PACK at 09:55

## 2021-05-19 RX ADMIN — FINASTERIDE 5 MG: 5 TABLET, FILM COATED ORAL at 09:56

## 2021-05-19 RX ADMIN — CEFAZOLIN SODIUM 1 G: 1 INJECTION, SOLUTION INTRAVENOUS at 04:07

## 2021-05-19 RX ADMIN — OXYCODONE HYDROCHLORIDE 5 MG: 5 TABLET ORAL at 18:27

## 2021-05-19 RX ADMIN — MONTELUKAST 10 MG: 10 TABLET, FILM COATED ORAL at 22:42

## 2021-05-19 RX ADMIN — CEFAZOLIN SODIUM 1 G: 1 INJECTION, SOLUTION INTRAVENOUS at 12:00

## 2021-05-19 RX ADMIN — MULTIPLE VITAMINS W/ MINERALS TAB 1 TABLET: TAB at 09:56

## 2021-05-19 RX ADMIN — ATORVASTATIN CALCIUM 20 MG: 20 TABLET, FILM COATED ORAL at 17:31

## 2021-05-19 RX ADMIN — THIAMINE HCL TAB 100 MG 200 MG: 100 TAB at 19:58

## 2021-05-19 RX ADMIN — CLONIDINE HYDROCHLORIDE 0.1 MG: 0.1 TABLET ORAL at 17:31

## 2021-05-19 RX ADMIN — FERROUS SULFATE TAB 325 MG (65 MG ELEMENTAL FE) 325 MG: 325 (65 FE) TAB at 09:56

## 2021-05-19 ASSESSMENT — ACTIVITIES OF DAILY LIVING (ADL): DEPENDENT_IADLS:: TRANSPORTATION

## 2021-05-19 ASSESSMENT — MIFFLIN-ST. JEOR: SCORE: 1555.75

## 2021-05-19 NOTE — PROGRESS NOTES
Ortho    No acute events overnight.  Minimal pain  Sleeping comfortably    AFVSS  Pleasant, NAD  Nonlabored breathing  Right LE: Incision cdi without drainage.  Erythema on leg about the same    IMPRESSION:   1. Left lower leg swelling and possible cellultis s/p revision TKA 4.29.21   2. Comorbidities including history of liver failure, CLL, COPD    PLAN:   --Cont ancef and vanco per hospitalist for cellulitis   --WBAT left leg.  PT for mobilization   --DVT prophylaxis: ASA 325mg daily x 30d   --Cont oxy 5mg prn for pain control    --Appreciate social working seeing about home care vs TCU   --Cont CIWA protocol   --Dispo: Home with cares vs TCU     Carlton Jones MD

## 2021-05-19 NOTE — PROGRESS NOTES
Initial IP Physical Therapy Evaluation       05/19/21 1050   Quick Adds   Type of Visit Initial PT Evaluation   Living Environment   People in home alone   Current Living Arrangements apartment   Home Accessibility no concerns   Living Environment Comments 1st floor apartment, no stairs to enter. Home fully accessible with walker   Self-Care   Usual Activity Tolerance good   Current Activity Tolerance moderate   Equipment Currently Used at Home cane, straight;walker, rolling;wheelchair, power   Activity/Exercise/Self-Care Comment Normally uses cane for mobility and power chair for longer distances.   Disability/Function   Hearing Difficulty or Deaf yes   Patient's preferred means of communication verbal   Describe hearing loss bilateral hearing loss   Use of hearing assistive devices bilateral hearing aids   Were auxiliary aids offered? no   The following aids were provided; patient declined offer of auxiliary aids   Wear Glasses or Blind no   Vision Management glasses   Concentrating, Remembering or Making Decisions Difficulty no   Difficulty Communicating no   Walking or Climbing Stairs Difficulty yes   Walking or Climbing Stairs ambulation difficulty, requires equipment;stair climbing difficulty, requires equipment   Mobility Management FWW   Dressing/Bathing Difficulty no   Toileting issues no   Doing Errands Independently Difficulty (such as shopping) no   Fall history within last six months yes   Number of times patient has fallen within last six months 3   Change in Functional Status Since Onset of Current Illness/Injury no   General Information   Onset of Illness/Injury or Date of Surgery 05/18/21   Referring Physician Esthela Burger PA-C   Patient/Family Therapy Goals Statement (PT) Go home today   Pertinent History of Current Problem (include personal factors and/or comorbidities that impact the POC) Abhijeet Mccauley is a 60 year old male who presents on 5/18/2021 with left lower ext pain, erythema  "and edema.    Existing Precautions/Restrictions fall   Weight-Bearing Status - LLE weight-bearing as tolerated   Weight-Bearing Status - RLE full weight-bearing   Cognition   Orientation Status (Cognition) oriented x 3   Affect/Mental Status (Cognition) WFL   Follows Commands (Cognition) WFL   Cognitive Status Comments Slow to respond to questions today   Pain Assessment   Patient Currently in Pain Yes, see Vital Sign flowsheet  (States pain \"is high\")   Integumentary/Edema   Integumentary/Edema other (describe)   Integumentary/Edema Comments 1+ edema LLE distal to knee   Posture    Posture Not impaired   Range of Motion (ROM)   ROM Comment BLE WFL, L knee flexion to 65 degrees   Strength   Strength Comments LLE deconditioned, difficulty fully extending but WFL; RLE WFL   Bed Mobility   Comment (Bed Mobility) Supine<>sit Ind   Transfers   Transfer Safety Comments Sit to stand Mod I with FWW, steady   Gait/Stairs (Locomotion)   [place] Level (Gait) supervision   Assistive Device (Gait) walker, front-wheeled   Distance in Feet (Required for LE Total Joints) 150   Pattern (Gait) step-through   Comment (Gait/Stairs) Patient able to safely ambulate hallway, able to fully bear weight onto LLE. Slow, antalgic pattern but pain tolerable. Appears to be near baseline. VC for posture, walker positioning and attention to task   Balance   Balance other (describe)   Balance Comments FWW,SBA   Sensory Examination   Sensory Perception WFL   Coordination   Coordination no deficits were identified   Muscle Tone   Muscle Tone no deficits were identified   Clinical Impression   Criteria for Skilled Therapeutic Intervention yes, treatment indicated   PT Diagnosis (PT) Impaired functional mobility   Influenced by the following impairments Pain, weakness, decreased activity tolerance   Functional limitations due to impairments Mobility transfres   Clinical Presentation Stable/Uncomplicated   Clinical Presentation Rationale Clinical " judgment   Clinical Decision Making (Complexity) low complexity   Therapy Frequency (PT) Daily   Predicted Duration of Therapy Intervention (days/wks) 3 days   Planned Therapy Interventions (PT) balance training;bed mobility training;gait training;home exercise program;transfer training;strengthening;neuromuscular re-education   Anticipated Equipment Needs at Discharge (PT) walker, rolling   Risk & Benefits of therapy have been explained evaluation/treatment results reviewed;care plan/treatment goals reviewed;risks/benefits reviewed;current/potential barriers reviewed;participants voiced agreement with care plan;patient;participants included   PT Discharge Planning    PT Discharge Recommendation (DC Rec) home with home care physical therapy   PT Rationale for DC Rec Patient ambulating adequately to discharge home with home services including home PT. Patient states he has not been to any PT appointments since surgery.    PT Brief overview of current status  150' FWW SBA, SBA to Ind with transfers and bed mobility   Therapy Certification   Start of care date 05/19/21   Certification date from 05/19/21   Certification date to 05/22/21   Medical Diagnosis Cellulitis   Total Evaluation Time   Total Evaluation Time (Minutes) 10     Victor Manuel Canchola, PT, DPT

## 2021-05-19 NOTE — PLAN OF CARE
Vital signs stable.  Patient is alert and oriented except to time is forgetful.  Receiving IV antibiotics for left lower extremity cellulitis.  CIWA scores of 4, no ativan administered.  Patient is ambulating with walker and sba, working with therapies.  Potassium level normal today, continuing phos replacement from yesterday and then will be rechecked in the morning.  Denied pain this morning, some now in the left knee, oxycodone 5mg given.

## 2021-05-19 NOTE — PROGRESS NOTES
[facility]      OUTPATIENT PHYSICAL THERAPY EVALUATION  PLAN OF TREATMENT FOR OUTPATIENT REHABILITATION  (COMPLETE FOR INITIAL CLAIMS ONLY)  Patient's Last Name, First Name, M.I.  YOB: 1960  Abhijeet Mccauley                        Provider's Name  [facility] Medical Record No.  [id number]                               Onset Date:  05/18/21   Start of Care Date:  (P) 05/19/21      Type:     _X_PT   ___OT   ___SLP Medical Diagnosis:  (P) Cellulitis                        PT Diagnosis:  (P) Impaired functional mobility   Visits from SOC:  1   _________________________________________________________________________________  Plan of Treatment/Functional Goals    Planned Interventions: (P) balance training, bed mobility training, gait training, home exercise program, transfer training, strengthening, neuromuscular re-education     Goals: See Physical Therapy Goals on Care Plan in [facility] electronic health record.    Therapy Frequency: (P) Daily  Predicted Duration of Therapy Intervention: (P) 3 days  _________________________________________________________________________________    I CERTIFY THE NEED FOR THESE SERVICES FURNISHED UNDER        THIS PLAN OF TREATMENT AND WHILE UNDER MY CARE     (Physician co-signature of this document indicates review and certification of the therapy plan).                Certification date from: (P) 05/19/21, Certification date to: (P) 05/22/21    Referring Physician: Esthela Burger PA-C            Initial Assessment        See Physical Therapy evaluation dated (P) 05/19/21 in Epic electronic health record.

## 2021-05-19 NOTE — CONSULTS
Care Management Initial Consult    General Information  Assessment completed with: Patient- Baltazar  Type of CM/SW Visit: Offer D/C Planning    Primary Care Provider verified and updated as needed: Yes   Readmission within the last 30 days:  Yes      Reason for Consult: discharge planning  Advance Care Planning: Advance Care Planning Reviewed: no concerns identified        Communication Assessment  Patient's communication style: spoken language (English or Bilingual)    Hearing Difficulty or Deaf: yes   Wear Glasses or Blind: no    Cognitive  Cognitive/Neuro/Behavioral: .WDL except, orientation     Arousal Level: opens eyes spontaneously  Orientation: disoriented to, time  Mood/Behavior: calm, cooperative  Best Language: 0 - No aphasia  Speech: clear    Living Environment:   People in home: alone     Current living Arrangements: [facility]- independent senior apartment     Able to return to prior arrangements: yes     Family/Social Support:  Care provided by: self, homecare agency  Provides care for: no one  Marital Status: Single  Sibling(s): Sister Rowena- lives about 5 miles away, Brother Bhupendra -visits weekly PCA services          Description of Support System: Supportive, Involved    Support Assessment: Adequate family and caregiver support    Current Resources:   Patient receiving home care services: Yes -[employer]  Skilled Home Care Services: Skilled Nursing, Physicial Therapy, Occupational Therapy  Community Resources: [facility]  [phone]  PCA  Equipment currently used at home: electric scooter, walker   Supplies currently used at home:  Hearing aide batteries    Employment/Financial:  Employment Status: disabled        Financial Concerns: No concerns identified      Lifestyle & Psychosocial Needs:        Socioeconomic History     Marital status: Single     Spouse name: Not on file     Number of children: Not on file     Years of education: Not on file      "Highest education level: Not on file     Tobacco Use     Smoking status: Current Every Day Smoker     Packs/day: 0.50     Years: 30.00     Pack years: 15.00     Types: Cigarettes     Smokeless tobacco: Never Used     Tobacco comment: 5 cigarettes a day    Substance and Sexual Activity     Alcohol use: Not Currently     Comment: quit 3 wks ago 3/7/21     Drug use: Yes     Types: Marijuana     Sexual activity: Not Currently       Functional Status:  Prior to admission patient needed assistance:   Dependent ADLs:: Ambulation-walker  Dependent IADLs:: Transportation       Mental Health Status:  Mental Health Status: No Current Concerns       Chemical Dependency Status:  Chemical Dependency Status: No Current Concerns           Values/Beliefs:  Spiritual, Cultural Beliefs, [employer] Practices, Values that affect care: no            Additional Information:  Care Transitions has been consulted for discharge planning.  SW met with pt to introduce self/role, perform assessment, and discuss resources.    Pt states he lives alone in a independent -senior apartment [facility]. Pt has a  through [facility] # [phone] and receives PCA services through [facility] #[phone]. CM called and left message with PCA agency to confirm services. Awaiting call back.     CM left message with Pt's MH  through [facility] # [phone] with discharge plan.     Pt is also open to [facility] Home care for -RN and PT  #[phone] Fax# [phone]. Confirmed services and faxed updated notes to intake.      TCU vs returning home and resuming his previous services were discussed. Pt verbalized a strong desire to return back home on discharge. Pt declines TCU options/further discussion and stated \"I will not be going to a TCU.\" Pt stated he has no concerns returning back home on discharge and resuming his services as previous. States he has his walker and " electric scooter to assist with his mobility.     Pt reports that his sister Rowena lives roughly 5 miles away and his brother Bhupendra lives in the [facility]. Pt provided approval for CM to contact his brother Bhupendra to discuss the discharge plan. CM reached out to Brother Bhupendra via phone, confirmed discharge plan.     Pt requested assistance with arranging transportation home on discharge. Due to Pt's [facility] [place] policy-CM called [facility] ([phone]) to arrange ride.     [employer] Van was arranged through [facility] for 05/20/21 at 1400  # [phone].  will meet the Pt at the main hospital entrance.     Plan: Return home and resume [facility] Phone: [phone] Fax: [phone] and PCA services through [facility]placed by [facility] #[phone]    [employer] scheduled: 05/2021 @ 1400  #[phone]    RUDY Workman

## 2021-05-19 NOTE — PLAN OF CARE
"A&O, disorientated to time. Up w/assist of one and walker. CIWA of 5/4/5, declined pain medication. Redness to LLE remains unchanged. IV SL, vss, rested comfortably throughout shift. /54 (BP Location: Left arm)   Pulse 73   Temp 97.8  F (36.6  C) (Oral)   Resp 16   Ht 1.676 m (5' 6\")   Wt 78.3 kg (172 lb 9.9 oz)   SpO2 97%   BMI 27.86 kg/m  .  "

## 2021-05-19 NOTE — PROGRESS NOTES
[facility]    Hospitalist Progress Note    Date of Service (when I saw the patient): 05/19/2021    Assessment & Plan   Abhijeet Mccauley is a 60 year old male who presents on 5/18/2021 with left lower ext pain, erythema and edema.      Cellulitis of left lower extremity  S/P total knee arthroplasty, left  Generalized weakness  History of left total knee replacement with Dr. Jones on 4/29/21. Discharged home on 5/1/21. Continues to drink alcohol. Some falls, feels unsteady. Was seen on 5/4/21 for wound care and 5/11/21 for concerns of infection, though was seen by Robert during ER visit and he believed was routine post op healing. Patient reports continued erythema, pain and edema of left lower ext. WBC 11.4, lactic acid 2.5--1.9. afebrile. CRP 71.8. sed rate 29.  XR left knee with anterior soft tissue swelling- increased from prior.   Started on Ancef 1 g in the emergency department.   - Continue Ancef.   - MRSA nasal swab negative   - Discontinue Vanco   - Orthopedic surgery consult. Removed staples on 5/18.    - Physical therapy, occupational therapy, social work for discharge planning, may need TCU. Though on admission, he is refusing TCU/rehab.    - Fall precautions.    - Up with assist.    - Blood culture x 2 from 5/18/21     Hypokalemia  Initial potassium of 3.0.   - Replacement and monitoring per protocol.      Lactic acidosis  Initial lactic 2.5.  He was given 1000 cc of LR in the emergency department.  Repeat lactic acid of 1.9. Vitals stable. Suspect dehydration in combination with alcoholism.    - Blood cultures x 2 negative      Essential hypertension  Chronic. Blood pressures reviewed, stable.    - holding prior to admission lasix and spironolactone initially on admission.      Acute myeloid leukemia in remission   S/p chemotherapy.  - Continue outpatient surveillance.      CKD (chronic kidney disease) stage 3, GFR 30-59 ml/min   Creatinine 1.87, GFR 38. Recent baseline  1.79-2.2.     - Cr 1.73      Mild intermittent asthma without complication  - Continue home albuterol prn     Peptic ulcer disease  Noted in problem list, previous EGD in 2018 showed gastritis and ulcer.   - Continue home pantoprazole.     Benign Prostatic Hyperplasia  Chronic.  - Continue home finasteride, tamsulosin      Alcohol dependence  Alcoholic cirrhosis of liver  History of hepatic encephalopathy  History of Esophageal varices  Hypophosphatemia  He reports drinking occasionally, though I am concerned this is more of a daily occurrence. Drink of choice is rum.  Had issues with alcohol withdrawal on prior hospitalizations.   Etoh 0.25 at time of admission.    - Initiate CIWA.    - Daily vitamins.    - Check INR.    - Phosphorus 2.2, replace per protocl     Tobacco dependence syndrome  Smokes 5-6 cigarettes per day.   Denies needing patch.       Anemia, chronic   Longstanding anemia, baseline recently 6.6-9.8. Iron studies showed deficiency, suspected to have chronic GI bleed. Endoscopy in 2/2020 showed alcoholic gastritis. Previous to that endoscopy 2/2018 showed gastric ulcers, esophagitis. Endoscopy 1/2018 showed grade 2 varices which were banded/eradicated at that time.   Hemoglobin on admission of 8.9.    - Continue home iron.       Hyperlipidemia  Chronic.   - Continue home atorvastatin     COVID-19 ruled out  Patient was tested on hospital admission, negative.   He received his first Moderna vaccination on 4/6/21.     Diet:  regular   DVT Prophylaxis:  mg   Chong Catheter: not present  Code Status:  Full     Disposition: Expected discharge in 1-2 days once joint infection ruled out.    Napoleon Rene    Interval History   The patient is resting in bed.  His knee and shin pain have improved.  He denies fevers or chills.    -Data reviewed today: I reviewed all new labs and imaging results over the last 24 hours. I personally reviewed no images or EKG's today.    Physical Exam   Temp: 98.1  F  (36.7  C) Temp src: Oral BP: 120/60 Pulse: 73   Resp: 18 SpO2: 96 % O2 Device: None (Room air)    Vitals:    05/18/21 1400 05/19/21 0523   Weight: 78.3 kg (172 lb 9.9 oz) 81.3 kg (179 lb 3.7 oz)     Vital Signs with Ranges  Temp:  [97.5  F (36.4  C)-98.4  F (36.9  C)] 98.1  F (36.7  C)  Pulse:  [70-87] 73  Resp:  [16-18] 18  BP: (111-132)/(54-64) 120/60  SpO2:  [96 %-98 %] 96 %  I/O last 3 completed shifts:  In: -   Out: 200 [Urine:200]    Gen: Well nourished, well developed, resting in bed, no acute distressed  HEENT: Atraumatic, normocephalic; sclera non-injected, anicterric; oral mucosa moist, no lesion, no exudate, normal dentition  Lungs: Clear to ausculation, no wheezes, no rhonchi, no rales  Heart: Regular rate, regular rhythm, no gallops, no rubs, no murmurs  GI: Bowel sound normal, no hepatosplenomegaly, no masses, non-tender, non-distended, no guarding, no rebound tenderness  Lymph: No lymphadenopathy, no edema  Skin: Incision over left knee with edges well approximated.  Mild surrounding erythema and tenderness.  Mild erythema over ipsilateral shin.    Medications       aspirin  325 mg Oral Daily     atorvastatin  20 mg Oral QPM     calcium carbonate-vitamin D  1 tablet Oral BID     ceFAZolin  1 g Intravenous Q8H     cloNIDine  0.1 mg Oral Q8H     ferrous sulfate  325 mg Oral Every Other Day     finasteride  5 mg Oral Daily     folic acid  1 mg Oral Daily     [Held by provider] furosemide  20 mg Oral Daily     [START ON 5/25/2021] gabapentin  100 mg Oral Q8H     [START ON 5/23/2021] gabapentin  300 mg Oral Q8H     [START ON 5/21/2021] gabapentin  600 mg Oral Q8H     gabapentin  900 mg Oral Q8H     montelukast  10 mg Oral At Bedtime     multivitamin w/minerals  1 tablet Oral Daily     [Held by provider] spironolactone  25 mg Oral Daily     tamsulosin  0.4 mg Oral Daily     thiamine  200 mg Oral TID    Followed by     [START ON 5/20/2021] thiamine  100 mg Oral TID    Followed by     [START ON 5/26/2021]  thiamine  100 mg Oral Daily       Data   Recent Labs   Lab 05/19/21  0509 05/18/21  2154 05/18/21  1459 05/18/21  0616   WBC 9.5  --   --  11.4*   HGB 9.4*  --   --  8.9*   MCV 96  --   --  96   *  --   --  162   INR  --   --  1.39*  --      --   --  142   POTASSIUM 3.8 4.2  --  3.0*   CHLORIDE 108  --   --  108   CO2 29  --   --  26   BUN 17  --   --  16   CR 1.73*  --   --  1.87*   ANIONGAP 3  --   --  8   BEE 8.7  --   --  8.8   GLC 94  --   --  135*   ALBUMIN  --   --   --  3.1*   PROTTOTAL  --   --   --  6.6*   BILITOTAL  --   --   --  1.0   ALKPHOS  --   --   --  511*   ALT  --   --   --  20   AST  --   --   --  36       No results found for this or any previous visit (from the past 24 hour(s)).

## 2021-05-19 NOTE — PROGRESS NOTES
05/19/21 1200   Quick Adds   Type of Visit Initial Occupational Therapy Evaluation       Present no   Living Environment   People in home alone   Current Living Arrangements apartment   Home Accessibility no concerns   Living Environment Comments Home RN to assist with medication set-up.    Self-Care   Usual Activity Tolerance good   Current Activity Tolerance moderate   Equipment Currently Used at Home cane, straight;walker, rolling;wheelchair, power   Disability/Function   Hearing Difficulty or Deaf yes   Patient's preferred means of communication verbal   Describe hearing loss bilateral hearing loss   Use of hearing assistive devices bilateral hearing aids   Were auxiliary aids offered? no   The following aids were provided; patient declined offer of auxiliary aids   Wear Glasses or Blind no   Concentrating, Remembering or Making Decisions Difficulty no   Difficulty Communicating no   Walking or Climbing Stairs Difficulty yes   Walking or Climbing Stairs ambulation difficulty, requires equipment;stair climbing difficulty, requires equipment   Dressing/Bathing Difficulty no   Toileting issues no   Doing Errands Independently Difficulty (such as shopping) no   Fall history within last six months yes   Number of times patient has fallen within last six months 3   Change in Functional Status Since Onset of Current Illness/Injury no   General Information   Onset of Illness/Injury or Date of Surgery 05/18/21   Referring Physician Esthela Burger PA-C   Patient/Family Therapy Goal Statement (OT) to return home.    Additional Occupational Profile Info/Pertinent History of Current Problem Abhijeet Mccauley is a 60 year old male who presents on 5/18/2021 with left lower ext pain, erythema and edema.    Existing Precautions/Restrictions fall   Cognitive Status Examination   Orientation Status orientation to person, place and time   Affect/Mental Status (Cognitive) WNL   Follows Commands WNL    Pain Assessment   Patient Currently in Pain No   Bed Mobility   Comment (Bed Mobility) independent    Balance   Balance Comments steady on feet using walker.    Upper Body Dressing Assessment/Training   [place] Level (Upper Body Dressing) independent   Lower Body Dressing Assessment/Training   [place] Level (Lower Body Dressing) supervision   Comment (Lower Body Dressing) following review of LB dressing techniques.    Toileting   [place] Level (Toileting) supervision   Clinical Impression   Criteria for Skilled Therapeutic Interventions Met (OT) yes;skilled treatment is necessary   OT Diagnosis assess ADLs for safe discharge recommendations    OT Problem List-Impairments impacting ADL range of motion (ROM)   Assessment of Occupational Performance 1-3 Performance Deficits   Identified Performance Deficits LB dressing, car transfer    Planned Therapy Interventions (OT) ADL retraining   Clinical Decision Making Complexity (OT) low complexity   Therapy Frequency (OT) 1x eval and treat   Risk & Benefits of therapy have been explained evaluation/treatment results reviewed;care plan/treatment goals reviewed;risks/benefits reviewed;current/potential barriers reviewed;participants voiced agreement with care plan;participants included;patient   OT Discharge Planning    OT Discharge Recommendation (DC Rec) home   OT Rationale for DC Rec pt appears to be at/close to baseline.    Total Evaluation Time (Minutes)   Total Evaluation Time (Minutes) 10

## 2021-05-19 NOTE — PLAN OF CARE
Occupational Therapy Discharge Summary    Reason for therapy discharge:    All goals and outcomes met, no further needs identified.    Progress towards therapy goal(s). See goals on Care Plan in [facility] electronic health record for goal details.  Goals met    Therapy recommendation(s):    No further OT needs identified.

## 2021-05-20 VITALS
HEART RATE: 71 BPM | HEIGHT: 66 IN | WEIGHT: 179.23 LBS | SYSTOLIC BLOOD PRESSURE: 111 MMHG | RESPIRATION RATE: 18 BRPM | DIASTOLIC BLOOD PRESSURE: 60 MMHG | BODY MASS INDEX: 28.81 KG/M2 | OXYGEN SATURATION: 95 % | TEMPERATURE: 98.1 F

## 2021-05-20 LAB
ANION GAP SERPL CALCULATED.3IONS-SCNC: 6 MMOL/L (ref 3–14)
BUN SERPL-MCNC: 18 MG/DL (ref 7–30)
CALCIUM SERPL-MCNC: 8.7 MG/DL (ref 8.5–10.1)
CHLORIDE SERPL-SCNC: 108 MMOL/L (ref 94–109)
CO2 SERPL-SCNC: 25 MMOL/L (ref 20–32)
CREAT SERPL-MCNC: 1.88 MG/DL (ref 0.66–1.25)
ERYTHROCYTE [DISTWIDTH] IN BLOOD BY AUTOMATED COUNT: 21.5 % (ref 10–15)
GFR SERPL CREATININE-BSD FRML MDRD: 37 ML/MIN/{1.73_M2}
GLUCOSE SERPL-MCNC: 96 MG/DL (ref 70–99)
HCT VFR BLD AUTO: 30.3 % (ref 40–53)
HGB BLD-MCNC: 8.9 G/DL (ref 13.3–17.7)
MCH RBC QN AUTO: 28 PG (ref 26.5–33)
MCHC RBC AUTO-ENTMCNC: 29.4 G/DL (ref 31.5–36.5)
MCV RBC AUTO: 95 FL (ref 78–100)
PHOSPHATE SERPL-MCNC: 2.2 MG/DL (ref 2.5–4.5)
PLATELET # BLD AUTO: 119 10E9/L (ref 150–450)
POTASSIUM SERPL-SCNC: 4 MMOL/L (ref 3.4–5.3)
RBC # BLD AUTO: 3.18 10E12/L (ref 4.4–5.9)
SODIUM SERPL-SCNC: 139 MMOL/L (ref 133–144)
WBC # BLD AUTO: 10 10E9/L (ref 4–11)

## 2021-05-20 PROCEDURE — 250N000013 HC RX MED GY IP 250 OP 250 PS 637: Performed by: PHYSICIAN ASSISTANT

## 2021-05-20 PROCEDURE — 250N000011 HC RX IP 250 OP 636: Performed by: FAMILY MEDICINE

## 2021-05-20 PROCEDURE — 96376 TX/PRO/DX INJ SAME DRUG ADON: CPT

## 2021-05-20 PROCEDURE — 84100 ASSAY OF PHOSPHORUS: CPT | Performed by: INTERNAL MEDICINE

## 2021-05-20 PROCEDURE — G0378 HOSPITAL OBSERVATION PER HR: HCPCS

## 2021-05-20 PROCEDURE — 250N000013 HC RX MED GY IP 250 OP 250 PS 637: Performed by: INTERNAL MEDICINE

## 2021-05-20 PROCEDURE — 99217 PR OBSERVATION CARE DISCHARGE: CPT | Performed by: INTERNAL MEDICINE

## 2021-05-20 PROCEDURE — 80048 BASIC METABOLIC PNL TOTAL CA: CPT | Performed by: INTERNAL MEDICINE

## 2021-05-20 PROCEDURE — 85027 COMPLETE CBC AUTOMATED: CPT | Performed by: INTERNAL MEDICINE

## 2021-05-20 PROCEDURE — 36415 COLL VENOUS BLD VENIPUNCTURE: CPT | Performed by: INTERNAL MEDICINE

## 2021-05-20 RX ORDER — CEPHALEXIN 500 MG/1
500 CAPSULE ORAL 3 TIMES DAILY
Qty: 21 CAPSULE | Refills: 0 | Status: SHIPPED | OUTPATIENT
Start: 2021-05-20 | End: 2021-05-27

## 2021-05-20 RX ADMIN — FINASTERIDE 5 MG: 5 TABLET, FILM COATED ORAL at 09:27

## 2021-05-20 RX ADMIN — THIAMINE HCL TAB 100 MG 200 MG: 100 TAB at 09:26

## 2021-05-20 RX ADMIN — Medication 1 TABLET: at 09:26

## 2021-05-20 RX ADMIN — MULTIPLE VITAMINS W/ MINERALS TAB 1 TABLET: TAB at 09:26

## 2021-05-20 RX ADMIN — POTASSIUM & SODIUM PHOSPHATES POWDER PACK 280-160-250 MG 1 PACKET: 280-160-250 PACK at 09:25

## 2021-05-20 RX ADMIN — TAMSULOSIN HYDROCHLORIDE 0.4 MG: 0.4 CAPSULE ORAL at 09:27

## 2021-05-20 RX ADMIN — CLONIDINE HYDROCHLORIDE 0.1 MG: 0.1 TABLET ORAL at 00:16

## 2021-05-20 RX ADMIN — GABAPENTIN 900 MG: 300 CAPSULE ORAL at 06:48

## 2021-05-20 RX ADMIN — ASPIRIN 325 MG ORAL TABLET 325 MG: 325 PILL ORAL at 09:26

## 2021-05-20 RX ADMIN — FOLIC ACID 1 MG: 1 TABLET ORAL at 09:26

## 2021-05-20 RX ADMIN — CLONIDINE HYDROCHLORIDE 0.1 MG: 0.1 TABLET ORAL at 09:26

## 2021-05-20 RX ADMIN — CEFAZOLIN SODIUM 1 G: 1 INJECTION, SOLUTION INTRAVENOUS at 04:47

## 2021-05-20 NOTE — DISCHARGE SUMMARY
[facility]  Hospitalist Discharge Summary       Date of Admission:  5/18/2021  Date of Discharge:  5/20/2021 11:22 AM  Discharging Provider: Napoleon Rene MD      Discharge Diagnoses     Cellulitis of left lower extremity  S/P total knee arthroplasty, left  Generalized weakness  Hypokalemia  Lactic acidosis  Essential hypertension  Acute myeloid leukemia in remission    CKD (chronic kidney disease) stage 3, GFR 30-59 ml/min   Mild intermittent asthma without complication  Peptic ulcer disease  Benign Prostatic Hyperplasia  Alcohol dependence  Alcoholic cirrhosis of liver  History of hepatic encephalopathy  History of Esophageal varices  Hypophosphatemia  Tobacco dependence syndrome   Anemia, chronic    Hyperlipidemia  COVID-19 ruled out    Follow-ups Needed After Discharge   Follow-up Appointments     Follow-up and recommended labs and tests      Follow up with primary care provider, Chidi Valenzuela, within 7 days for   hospital follow- up.  The following labs/tests are recommended: BMP and   CBC.           Unresulted Labs Ordered in the Past 30 Days of this Admission     Date and Time Order Name Status Description    5/18/2021 0530 Blood culture Preliminary     5/18/2021 0530 Blood culture Preliminary       These results will be followed up by Napoleon Rene or PCP    Discharge Disposition   Discharged to home  Condition at discharge: Stable    Hospital Course   Abhijeet Mccauley is a 60 year old male who presents on 5/18/2021 with left lower ext pain, erythema and edema.      Cellulitis of left lower extremity  S/P total knee arthroplasty, left  Generalized weakness  History of left total knee replacement with Dr. Jones on 4/29/21. Discharged home on 5/1/21. Continues to drink alcohol. Some falls, feels unsteady. Was seen on 5/4/21 for wound care and 5/11/21 for concerns of infection, though was seen by Robert during ER visit and he believed was routine post op healing. Patient  reports continued erythema, pain and edema of left lower ext. WBC 11.4, lactic acid 2.5--1.9. afebrile. CRP 71.8. sed rate 29.  XR left knee with anterior soft tissue swelling- increased from prior.   Started on Ancef 1 g in the emergency department.   - Continue Ancef.   - MRSA nasal swab negative   - Discontinued Vanco 5/19   - Orthopedic surgery consult. Removed staples on 5/18.    - Physical therapy, occupational therapy recommending home PT   - Blood culture x 2 from 5/18/21 NGTD   - Discharged home with 7 day course of Keflex     Hypokalemia  Initial potassium of 3.0.   - Replacement and monitoring per protocol.      Lactic acidosis  Initial lactic 2.5.  He was given 1000 cc of LR in the emergency department.  Repeat lactic acid of 1.9. Vitals stable. Suspect dehydration in combination with alcoholism.    - Blood cultures x 2 negative      Essential hypertension  Chronic. Blood pressures reviewed, stable.    - holding prior to admission lasix and spironolactone initially on admission.      Acute myeloid leukemia in remission   S/p chemotherapy.  - Continue outpatient surveillance.      CKD (chronic kidney disease) stage 3, GFR 30-59 ml/min   Creatinine 1.87, GFR 38. Recent baseline 1.79-2.2.     - Cr 1.73      Mild intermittent asthma without complication  - Continue home albuterol prn     Peptic ulcer disease  Noted in problem list, previous EGD in 2018 showed gastritis and ulcer.   - Continue home pantoprazole.     Benign Prostatic Hyperplasia  Chronic.  - Continue home finasteride, tamsulosin      Alcohol dependence  Alcoholic cirrhosis of liver  History of hepatic encephalopathy  History of Esophageal varices  Hypophosphatemia  He reports drinking occasionally, though I am concerned this is more of a daily occurrence. Drink of choice is rum.  Had issues with alcohol withdrawal on prior hospitalizations.   Etoh 0.25 at time of admission.    - Initiate CIWA, but no signs of alcohol withdrawal during hospital  stay    - Daily vitamins.    - INR 1.39   - Phosphorus 2.2, replace per protocol, OK for discharge on 5/20     Tobacco dependence syndrome  Smokes 5-6 cigarettes per day.   Denies needing patch.       Anemia, chronic   Longstanding anemia, baseline recently 6.6-9.8. Iron studies showed deficiency, suspected to have chronic GI bleed. Endoscopy in 2/2020 showed alcoholic gastritis. Previous to that endoscopy 2/2018 showed gastric ulcers, esophagitis. Endoscopy 1/2018 showed grade 2 varices which were banded/eradicated at that time.   Hemoglobin on admission of 8.9.    - Continue home iron.       Hyperlipidemia  Chronic.   - Continue home atorvastatin     COVID-19 ruled out  Patient was tested on hospital admission, negative.   He received his first Moderna vaccination on 4/6/21.    Consultations This Hospital Stay   ORTHOPEDIC SURGERY IP CONSULT  PHYSICAL THERAPY ADULT IP CONSULT  OCCUPATIONAL THERAPY ADULT IP CONSULT  SOCIAL WORK IP CONSULT  PHARMACY TO DOSE VANCO  CARE MANAGEMENT / SOCIAL WORK IP CONSULT    Code Status   Full Code    Time Spent on this Encounter   I, Napoleon Rene MD, personally saw the patient today and spent greater than 30 minutes discharging this patient.       Napoleon Rene MD  [facility]  ______________________________________________________________________    Physical Exam   Vital Signs: Temp: 98.1  F (36.7  C) Temp src: Oral BP: 111/60 Pulse: 71   Resp: 18 SpO2: 95 % O2 Device: None (Room air)    Weight: 179 lbs 3.74 oz       Gen: Well nourished male, alert and oriented x 3, flat affect  HEENT: Atraumatic, normocephalic; sclera non-injected, anicterric; oral mucosa moist, no lesion, no exudate  Lungs: Clear to ausculation, no wheezes, no rhonchi, no rales  Heart: Regular rate, regular rhythm, no gallops, no rubs, no murmurs  GI: Bowel sound normal, no hepatosplenomegaly, no masses, non-tender, non-distended, no guarding, no rebound tenderness  Lymph:  No lymphadenopathy, no edema  Skin: No rashes, no chronic venous stasis     Primary Care Physician   Chidi Valenzuela    Discharge Orders      Home Care Referral      Reason for your hospital stay    This is a 60 year old male admitted with left knee and left shin cellulitis     Follow-up and recommended labs and tests    Follow up with primary care provider, Chidi Valenzuela, within 7 days for hospital follow- up.  The following labs/tests are recommended: BMP and CBC.     Activity    Your activity upon discharge: activity as tolerated     Diet    Follow this diet upon discharge: Orders Placed This Encounter      Low Saturated Fat Na <2400 mg       Significant Results and Procedures   Most Recent 3 CBC's:  Recent Labs   Lab Test 05/20/21  0533 05/19/21  0509 05/18/21  0616   WBC 10.0 9.5 11.4*   HGB 8.9* 9.4* 8.9*   MCV 95 96 96   * 142* 162     Most Recent 3 BMP's:  Recent Labs   Lab Test 05/20/21  0533 05/19/21  0509 05/18/21  2154 05/18/21  0616    140  --  142   POTASSIUM 4.0 3.8 4.2 3.0*   CHLORIDE 108 108  --  108   CO2 25 29  --  26   BUN 18 17  --  16   CR 1.88* 1.73*  --  1.87*   ANIONGAP 6 3  --  8   BEE 8.7 8.7  --  8.8   GLC 96 94  --  135*     Most Recent 6 Bacteria Isolates From Any Culture (See EPIC Reports for Culture Details):  Recent Labs   Lab Test 05/18/21  0620 05/18/21  0616 03/09/21  1255 01/13/21  1601 01/05/21  1330 12/04/20  1130   CULT No growth after 2 days No growth after 2 days >100,000 colonies/mL  Enterococcus faecalis  *  <10,000 colonies/mL  urogenital juani  Susceptibility testing not routinely done   Light growth  Candida albicans  *  Susceptibility testing not routinely done No growth Moderate growth  Candida albicans / dubliniensis  Candida albicans and Candida dubliniensis are not routinely speciated  Susceptibility testing not routinely done  *   ,   Results for orders placed or performed during the hospital encounter of 05/18/21   XR Knee Left 3 Views    Narrative     XR KNEE LEFT 3 VIEWS  5/18/2021 6:51 AM     HISTORY: pain, post-op infection concern    COMPARISON: 5/11/2021 and 4/29/2021      Impression    IMPRESSION:  TKA with patellar resurfacing and long stem femoral  component. Skin staples remain in place. Anterior soft tissue  swelling. The soft tissue swelling has increased in the interval. The  knee joint effusion has decreased. No fractures are evident. No areas  of focal osteolysis are evident. Normal patellar alignment.     BEAU VILLA MD     *Note: Due to a large number of results and/or encounters for the requested time period, some results have not been displayed. A complete set of results can be found in Results Review.       Discharge Medications   Current Discharge Medication List      START taking these medications    Details   cephALEXin (KEFLEX) 500 MG capsule Take 1 capsule (500 mg) by mouth 3 times daily for 7 days  Qty: 21 capsule, Refills: 0    Associated Diagnoses: Prepatellar bursitis of left knee         CONTINUE these medications which have NOT CHANGED    Details   ACETAMINOPHEN EXTRA STRENGTH 500 MG tablet TAKE ONE TO TWO TABLETS BY MOUTH EVERY 6 HOURS AS NEEDED FOR MILD PAIN. DO NOT TAKE MORE THAN 3000 MG ACETAMINOPHEN TOTAL IN ONE DAY.  Qty: 100 tablet, Refills: 0    Associated Diagnoses: Pain in both hands      albuterol (VENTOLIN HFA) 108 (90 Base) MCG/ACT inhaler Inhale 2 puffs into the lungs every 4 hours as needed for shortness of breath / dyspnea or wheezing  Qty: 18 g, Refills: 11    Comments: Pharmacy may dispense brand covered by insurance (Proair, or proventil or ventolin or generic albuterol inhaler)  Associated Diagnoses: Mild intermittent asthma without complication      aspirin (ASA) 325 MG EC tablet Take 1 tablet (325 mg) by mouth daily  Qty: 30 tablet, Refills: 0    Associated Diagnoses: History of revision of total replacement of left knee joint      atorvastatin (LIPITOR) 20 MG tablet TAKE ONE TABLET BY MOUTH ONCE  DAILY  Qty: 90 tablet, Refills: 0    Associated Diagnoses: ASCVD (arteriosclerotic cardiovascular disease)      calcium carbonate-vitamin D (OSCAL W/D) 500-200 MG-UNIT tablet Take 1 tablet by mouth 2 times daily  Qty: 60 tablet, Refills: 1    Associated Diagnoses: Stress fracture of right tibia, initial encounter      diazepam (VALIUM) 5 MG tablet Take 1 tablet (5 mg) by mouth every 6 hours as needed for muscle spasms  Qty: 40 tablet, Refills: 0    Associated Diagnoses: History of revision of total replacement of left knee joint      ferrous sulfate (FEROSUL) 325 (65 Fe) MG tablet Take 1 tablet (325 mg) by mouth every other day  Qty: 45 tablet, Refills: 3    Associated Diagnoses: Iron deficiency anemia due to chronic blood loss      finasteride (PROSCAR) 5 MG tablet Take 1 tablet (5 mg) by mouth daily  Qty: 90 tablet, Refills: 3    Associated Diagnoses: Benign prostatic hyperplasia with urinary frequency      folic acid (FOLVITE) 1 MG tablet Take 1 tablet (1 mg) by mouth daily  Qty: 90 tablet, Refills: 3    Associated Diagnoses: Alcohol use disorder, severe, dependence (H)      gabapentin (NEURONTIN) 300 MG capsule Take 1 capsule (300 mg) by mouth 2 times daily  Qty: 60 capsule, Refills: 3    Comments: The patient requests that this prescription be held on file for filling in the near future.  Associated Diagnoses: Osteoarthritis of spine without myelopathy or radiculopathy, sacral and sacrococcygeal region      hydrOXYzine (ATARAX) 25 MG tablet Take 1-2 tablets (25-50 mg) by mouth every 6 hours as needed for itching or anxiety (with pain, moderate pain)  Qty: 40 tablet, Refills: 0    Associated Diagnoses: History of revision of total replacement of left knee joint      loratadine (CLARITIN) 10 MG tablet TAKE ONE TABLET BY MOUTH ONCE DAILY AS NEEDED FOR ALLERGIES  Qty: 90 tablet, Refills: 3    Associated Diagnoses: Seasonal allergic rhinitis due to pollen      mineral oil-white petrolatum (EUCERIN) CREA cream Apply  topically to back at bedtime for xerosis      montelukast (SINGULAIR) 10 MG tablet TAKE ONE TABLET BY MOUTH AT BEDTIME  Qty: 90 tablet, Refills: 0    Associated Diagnoses: Seasonal allergic rhinitis due to pollen      pantoprazole (PROTONIX) 40 MG EC tablet TAKE 1 TABLET (40 MG) BY MOUTH 2 TIMES DAILY  Qty: 180 tablet, Refills: 1    Associated Diagnoses: Gastroesophageal reflux disease without esophagitis      senna-docusate (SENOKOT-S/PERICOLACE) 8.6-50 MG tablet Take 1-2 tablets by mouth daily as needed for constipation Take while on oral narcotics to prevent or treat constipation.  Qty: 15 tablet, Refills: 0    Comments: While taking narcotics  Associated Diagnoses: History of revision of total replacement of left knee joint      tamsulosin (FLOMAX) 0.4 MG capsule Take 1 capsule (0.4 mg) by mouth daily  Qty: 30 capsule, Refills: 11    Associated Diagnoses: Benign localized prostatic hyperplasia with lower urinary tract symptoms (LUTS)      traZODone (DESYREL) 50 MG tablet Take 1 tablet (50 mg) by mouth At Bedtime  Qty: 90 tablet, Refills: 3    Associated Diagnoses: Insomnia, unspecified type      vitamin B1 (THIAMINE) 100 MG tablet Take 1 tablet (100 mg) by mouth daily  Qty:      Associated Diagnoses: Alcohol use disorder, severe, dependence (H)      XIFAXAN 550 MG TABS tablet TAKE 1 TABLET (550 MG) BY MOUTH 2 TIMES DAILY  Qty: 180 tablet, Refills: 3    Associated Diagnoses: Alcohol dependence with alcohol-induced anxiety disorder (H)      diclofenac (VOLTAREN) 1 % topical gel PLACE 2 GRAMS ONTO THE SKIN FOUR TIMES A DAY AS NEEDED FOR MODERATE PAIN  Qty: 100 g, Refills: 11    Associated Diagnoses: Bilateral hand pain      !! order for DME Equipment being ordered: 4 wheel walker  Qty: 1 Units, Refills: 0    Associated Diagnoses: Peripheral polyneuropathy; Primary osteoarthritis of left knee      !! order for DME Equipment being ordered: bed pull up bar  Qty: 1 Units, Refills: 0    Associated Diagnoses: Generalized  weakness      !! order for DME Equipment being ordered: shower chair  Qty: 1 Device, Refills: 0    Associated Diagnoses: Acute myeloid leukemia in remission (H)      oxyCODONE (ROXICODONE) 5 MG tablet Take 1-2 tablets (5-10 mg) by mouth every 3 hours as needed for pain (Moderate to Severe)  Qty: 40 tablet, Refills: 0    Associated Diagnoses: History of revision of total replacement of left knee joint       !! - Potential duplicate medications found. Please discuss with provider.      STOP taking these medications       furosemide (LASIX) 20 MG tablet Comments:   Reason for Stopping:         ibuprofen (ADVIL/MOTRIN) 600 MG tablet Comments:   Reason for Stopping:         spironolactone (ALDACTONE) 25 MG tablet Comments:   Reason for Stopping:             Allergies   Allergies   Allergen Reactions     Blood Transfusion Related (Informational Only) Other (See Comments)     Patient has a history of a clinically significant antibody against RBC antigens.  A delay in compatible RBCs may occur.     Famotidine Cramps and GI Disturbance     Severe abdominal cramps     Pepcid GI Disturbance     Severe abdominal cramps     Vancomycin Visual Disturbance     Hallucinations     Bactrim Ds [Sulfamethoxazole W/Trimethoprim] Itching     Cyclobenzaprine Hives     Hydrocodone-Acetaminophen Rash     Methocarbamol Dermatitis     Localized to face     Vfend Nausea and GI Disturbance     IV - cold sweats ; Iron tablet - nausea/stomach pain

## 2021-05-20 NOTE — PROGRESS NOTES
[facility] [facility] DISCHARGE NOTE    Patient discharged to home at 11:22 AM via wheel chair. Accompanied by transport staff. Discharge instructions reviewed with patient, opportunity offered to ask questions. Prescriptions filled and sent with patient upon discharge. All belongings sent with patient.    Juana Guzman RN

## 2021-05-20 NOTE — PROGRESS NOTES
Addendum @ 1400:  [facility] [phone].  Returned call, left voicemail with discharge plans.        Care Management Discharge Note    Discharge Date: 05/20/21  Expected Time of Departure: 1200    Discharge Disposition: Home, Home Care    Discharge Services: PCA, Transportation Services    Discharge DME: None    Discharge Transportation: agency    Private pay costs discussed: transportation costs    Patient/family educated on Medicare website which has current facility and service quality ratings: yes    Education Provided on the Discharge Plan:  patient  Persons Notified of Discharge Plans: patient-Patient will call his sister  Patient/Family in Agreement with the Plan: yes    Handoff Referral Completed: Yes    Additional Information:  Met with patient at bedside, confirmed discharge plans with resumption of [facility] Phone: [phone] Fax: [phone] and PCA services (outlined in CM note 5/19).  Patient feels safe to discharge and is eager to get home.  Left message with Ana @  with discharge plans.      Discussed transportation home.  He feels he will need a wheelchair ride to his apartment since he does not have his cane or walker with him and doesn't feel comfortable ambulating without.  He has access to his cane in his apartment and feels comfortable managing at home.      Cancelled [employer] Transportation Van scheduled: 05/2021 @ 1400  #[phone].  Arranged for [facility] wheelchair ride at 1130 today.  Patient is in agreement.      CM offered to contact patient's family/sister to notify of discharge.  Patient prefers to contact his sister independently.      Kristal Minaya MSN, RN  Inpatient Care Coordinator  [facility] [phone]  [facility] [phone]

## 2021-05-20 NOTE — PLAN OF CARE
"Patient is A&O x4. Continues to receive IV antibiotics for left lower extremity cellulitis. Patient's left knee around previous surgical site is warm to touch, pink in color, and +1 edema. Left lower extremity is red in color but no edema present.  CIWA scores of 4 x2, no medications needed based on scores.  Patient is STBA  with RW. Patient is steady on feet and is working with therapy. Patient denies pain throughout the night. Continues on potassium and phosphorus replacement therapy. No potassium replacement needed this morning, just lab to draw potassium tomorrow 5/21/21 at 0600. Phosphorus was low at 2.2 replacement therapy ordered for 1 packet TID, and recheck Phosphorus 5/21/21.      /58 (BP Location: Right arm)   Pulse 67   Temp 98.2  F (36.8  C) (Oral)   Resp 20   Ht 1.676 m (5' 6\")   Wt 81.3 kg (179 lb 3.7 oz)   SpO2 100%   BMI 28.93 kg/m        Leyla Miller RN on 5/20/2021 at 3:11 AM    "

## 2021-05-21 NOTE — PLAN OF CARE
Physical Therapy Discharge Summary    Reason for therapy discharge:    Discharged to home with home therapy.    Progress towards therapy goal(s). See [facility] for goal details.  Goals partially met.  Barriers to achieving goals:   discharge from facility.    Therapy recommendation(s):    Continued therapy is recommended.  Rationale/Recommendations:  Home PT to maximize strength and mobility.

## 2021-05-23 ENCOUNTER — APPOINTMENT (OUTPATIENT)
Dept: CT IMAGING | Facility: CLINIC | Age: 61
End: 2021-05-23
Attending: EMERGENCY MEDICINE
Payer: COMMERCIAL

## 2021-05-23 ENCOUNTER — HOSPITAL ENCOUNTER (EMERGENCY)
Facility: CLINIC | Age: 61
Discharge: HOME | End: 2021-05-23
Attending: EMERGENCY MEDICINE | Admitting: EMERGENCY MEDICINE
Payer: COMMERCIAL

## 2021-05-23 VITALS
OXYGEN SATURATION: 98 % | SYSTOLIC BLOOD PRESSURE: 123 MMHG | TEMPERATURE: 98 F | WEIGHT: 180 LBS | DIASTOLIC BLOOD PRESSURE: 63 MMHG | HEART RATE: 80 BPM | RESPIRATION RATE: 18 BRPM | HEIGHT: 66 IN | BODY MASS INDEX: 28.93 KG/M2

## 2021-05-23 DIAGNOSIS — S00.10XA CONTUSION OF EYELID, UNSPECIFIED LATERALITY, INITIAL ENCOUNTER: ICD-10-CM

## 2021-05-23 DIAGNOSIS — F10.220 ALCOHOL DEPENDENCE WITH UNCOMPLICATED INTOXICATION (H): Chronic | ICD-10-CM

## 2021-05-23 DIAGNOSIS — S00.03XA CONTUSION OF SCALP: ICD-10-CM

## 2021-05-23 DIAGNOSIS — F10.929 ALCOHOLIC INTOXICATION WITH COMPLICATION (H): ICD-10-CM
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
## 2021-05-23 LAB
ALBUMIN SERPL-MCNC: 3 G/DL (ref 3.4–5)
ALP SERPL-CCNC: 509 U/L (ref 40–150)
ALT SERPL W P-5'-P-CCNC: 19 U/L (ref 0–70)
ANION GAP SERPL CALCULATED.3IONS-SCNC: 6 MMOL/L (ref 3–14)
AST SERPL W P-5'-P-CCNC: 45 U/L (ref 0–45)
BASOPHILS # BLD AUTO: 0.1 10E9/L (ref 0–0.2)
BASOPHILS NFR BLD AUTO: 0.8 %
BILIRUB SERPL-MCNC: 0.9 MG/DL (ref 0.2–1.3)
BUN SERPL-MCNC: 14 MG/DL (ref 7–30)
CALCIUM SERPL-MCNC: 8.3 MG/DL (ref 8.5–10.1)
CHLORIDE SERPL-SCNC: 113 MMOL/L (ref 94–109)
CO2 SERPL-SCNC: 21 MMOL/L (ref 20–32)
CREAT SERPL-MCNC: 1.71 MG/DL (ref 0.66–1.25)
DIFFERENTIAL METHOD BLD: ABNORMAL
EOSINOPHIL # BLD AUTO: 0.3 10E9/L (ref 0–0.7)
EOSINOPHIL NFR BLD AUTO: 2.4 %
ERYTHROCYTE [DISTWIDTH] IN BLOOD BY AUTOMATED COUNT: 21.1 % (ref 10–15)
ETHANOL SERPL-MCNC: 0.29 G/DL
GFR SERPL CREATININE-BSD FRML MDRD: 42 ML/MIN/{1.73_M2}
GLUCOSE SERPL-MCNC: 99 MG/DL (ref 70–99)
HCT VFR BLD AUTO: 32.5 % (ref 40–53)
HGB BLD-MCNC: 9.7 G/DL (ref 13.3–17.7)
IMM GRANULOCYTES # BLD: 0.1 10E9/L (ref 0–0.4)
IMM GRANULOCYTES NFR BLD: 1 %
LYMPHOCYTES # BLD AUTO: 1.4 10E9/L (ref 0.8–5.3)
LYMPHOCYTES NFR BLD AUTO: 13 %
MCH RBC QN AUTO: 28.8 PG (ref 26.5–33)
MCHC RBC AUTO-ENTMCNC: 29.8 G/DL (ref 31.5–36.5)
MCV RBC AUTO: 96 FL (ref 78–100)
MONOCYTES # BLD AUTO: 1.4 10E9/L (ref 0–1.3)
MONOCYTES NFR BLD AUTO: 12.8 %
NEUTROPHILS # BLD AUTO: 7.4 10E9/L (ref 1.6–8.3)
NEUTROPHILS NFR BLD AUTO: 70 %
NRBC # BLD AUTO: 0 10*3/UL
NRBC BLD AUTO-RTO: 0 /100
PLATELET # BLD AUTO: 184 10E9/L (ref 150–450)
POTASSIUM SERPL-SCNC: 3.9 MMOL/L (ref 3.4–5.3)
PROT SERPL-MCNC: 6.7 G/DL (ref 6.8–8.8)
RBC # BLD AUTO: 3.37 10E12/L (ref 4.4–5.9)
SODIUM SERPL-SCNC: 140 MMOL/L (ref 133–144)
WBC # BLD AUTO: 10.6 10E9/L (ref 4–11)

## 2021-05-23 PROCEDURE — 258N000003 HC RX IP 258 OP 636: Performed by: EMERGENCY MEDICINE

## 2021-05-23 PROCEDURE — 80053 COMPREHEN METABOLIC PANEL: CPT | Performed by: EMERGENCY MEDICINE

## 2021-05-23 PROCEDURE — 99284 EMERGENCY DEPT VISIT MOD MDM: CPT | Mod: 25

## 2021-05-23 PROCEDURE — 82077 ASSAY SPEC XCP UR&BREATH IA: CPT | Performed by: EMERGENCY MEDICINE

## 2021-05-23 PROCEDURE — 72125 CT NECK SPINE W/O DYE: CPT

## 2021-05-23 PROCEDURE — 99284 EMERGENCY DEPT VISIT MOD MDM: CPT | Performed by: EMERGENCY MEDICINE

## 2021-05-23 PROCEDURE — 85025 COMPLETE CBC W/AUTO DIFF WBC: CPT | Performed by: EMERGENCY MEDICINE

## 2021-05-23 PROCEDURE — 96360 HYDRATION IV INFUSION INIT: CPT

## 2021-05-23 PROCEDURE — 96361 HYDRATE IV INFUSION ADD-ON: CPT

## 2021-05-23 PROCEDURE — 70450 CT HEAD/BRAIN W/O DYE: CPT

## 2021-05-23 RX ORDER — SODIUM CHLORIDE 9 MG/ML
INJECTION, SOLUTION INTRAVENOUS CONTINUOUS
Status: DISCONTINUED | OUTPATIENT
Start: 2021-05-23 | End: 2021-05-23 | Stop reason: HOSPADM

## 2021-05-23 RX ADMIN — SODIUM CHLORIDE 1000 ML: 9 INJECTION, SOLUTION INTRAVENOUS at 16:11

## 2021-05-23 ASSESSMENT — MIFFLIN-ST. JEOR: SCORE: 1559.22

## 2021-05-23 NOTE — DISCHARGE INSTRUCTIONS
Head CT was normal.   Follow up as needed.    Continue home medications, including keflex for your leg.

## 2021-05-23 NOTE — ED NOTES
Pt arrived via EMS, sitting upright, in no acute distress.  Pt talking with slightly slurred speech.  Medics state he was crawling out of BR and then up up and moving in home prior to arrival.    Pt reports pain in knee, and head.   Pt states he had surgery on Thursday.  Pt admits to drinking ETOH and taking pain medications for his knee.     Spine:  Neg pain with palpation to C spine and down thoracic spine.  Clothes removed.    Head: Hematoma noted to occipital area approx 1 cm, no area.  Painful to palpate.  Skin:  multiple bruises noted on bilat arms and legs.   L Knee:  Healing surgical scar noted, Knee and LE reddened and warm and painful to palpate.  Chest, abd, pelvic: neg exam, no soft tissue or bony abnormalities, no pain with palpation  Neuro: no deficits noted, Confused to time and date.  PLAN:  20 g IV stared L AC,  and labs drawn and sent.    PLAN:  Will monitor, CT scan, and await Lab returns.
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
## 2021-05-24 NOTE — ED NOTES
Pt waiting for Ride home with wheelchair transport.   Pt upset it can't be sooner.   Pt offered sandwich, crackers, soda.  Pt declined.

## 2021-05-26 ENCOUNTER — HOSPITAL ENCOUNTER (EMERGENCY)
Facility: CLINIC | Age: 61
Discharge: HOME | End: 2021-05-26
Attending: PHYSICIAN ASSISTANT | Admitting: FAMILY MEDICINE
Payer: COMMERCIAL

## 2021-05-26 ENCOUNTER — APPOINTMENT (OUTPATIENT)
Dept: GENERAL RADIOLOGY | Facility: CLINIC | Age: 61
End: 2021-05-26
Attending: FAMILY MEDICINE
Payer: COMMERCIAL

## 2021-05-26 ENCOUNTER — APPOINTMENT (OUTPATIENT)
Dept: CT IMAGING | Facility: CLINIC | Age: 61
End: 2021-05-26
Attending: FAMILY MEDICINE
Payer: COMMERCIAL

## 2021-05-26 VITALS
SYSTOLIC BLOOD PRESSURE: 160 MMHG | RESPIRATION RATE: 18 BRPM | WEIGHT: 180 LBS | TEMPERATURE: 98.2 F | DIASTOLIC BLOOD PRESSURE: 75 MMHG | OXYGEN SATURATION: 99 % | HEART RATE: 81 BPM | BODY MASS INDEX: 29.05 KG/M2

## 2021-05-26 DIAGNOSIS — K70.31 ALCOHOLIC CIRRHOSIS OF LIVER WITH ASCITES (H): ICD-10-CM

## 2021-05-26 DIAGNOSIS — K52.9 GASTROENTERITIS: ICD-10-CM

## 2021-05-26 DIAGNOSIS — R82.90 ABNORMAL URINE: ICD-10-CM

## 2021-05-26 LAB
ALBUMIN SERPL-MCNC: 2.9 G/DL (ref 3.4–5)
ALBUMIN UR-MCNC: NEGATIVE MG/DL
ALP SERPL-CCNC: 472 U/L (ref 40–150)
ALT SERPL W P-5'-P-CCNC: 17 U/L (ref 0–70)
ANION GAP SERPL CALCULATED.3IONS-SCNC: 11 MMOL/L (ref 3–14)
APPEARANCE UR: CLEAR
AST SERPL W P-5'-P-CCNC: 48 U/L (ref 0–45)
BASOPHILS # BLD AUTO: 0.1 10E9/L (ref 0–0.2)
BASOPHILS NFR BLD AUTO: 0.6 %
BILIRUB SERPL-MCNC: 1.5 MG/DL (ref 0.2–1.3)
BILIRUB UR QL STRIP: NEGATIVE
BUN SERPL-MCNC: 12 MG/DL (ref 7–30)
CALCIUM SERPL-MCNC: 8.5 MG/DL (ref 8.5–10.1)
CHLORIDE SERPL-SCNC: 110 MMOL/L (ref 94–109)
CO2 SERPL-SCNC: 19 MMOL/L (ref 20–32)
COLOR UR AUTO: YELLOW
CREAT SERPL-MCNC: 1.5 MG/DL (ref 0.66–1.25)
DIFFERENTIAL METHOD BLD: ABNORMAL
EOSINOPHIL # BLD AUTO: 0.2 10E9/L (ref 0–0.7)
EOSINOPHIL NFR BLD AUTO: 2.2 %
ERYTHROCYTE [DISTWIDTH] IN BLOOD BY AUTOMATED COUNT: 19.4 % (ref 10–15)
ETHANOL SERPL-MCNC: <0.01 G/DL
GFR SERPL CREATININE-BSD FRML MDRD: 49 ML/MIN/{1.73_M2}
GLUCOSE SERPL-MCNC: 96 MG/DL (ref 70–99)
GLUCOSE UR STRIP-MCNC: NEGATIVE MG/DL
HCT VFR BLD AUTO: 30.3 % (ref 40–53)
HGB BLD-MCNC: 9.4 G/DL (ref 13.3–17.7)
HGB UR QL STRIP: NEGATIVE
IMM GRANULOCYTES # BLD: 0.1 10E9/L (ref 0–0.4)
IMM GRANULOCYTES NFR BLD: 0.6 %
KETONES UR STRIP-MCNC: NEGATIVE MG/DL
LABORATORY COMMENT REPORT: NORMAL
LACTATE BLD-SCNC: 1.7 MMOL/L (ref 0.7–2)
LEUKOCYTE ESTERASE UR QL STRIP: ABNORMAL
LIPASE SERPL-CCNC: 195 U/L (ref 73–393)
LYMPHOCYTES # BLD AUTO: 1.1 10E9/L (ref 0.8–5.3)
LYMPHOCYTES NFR BLD AUTO: 10.9 %
MCH RBC QN AUTO: 28.9 PG (ref 26.5–33)
MCHC RBC AUTO-ENTMCNC: 31 G/DL (ref 31.5–36.5)
MCV RBC AUTO: 93 FL (ref 78–100)
MONOCYTES # BLD AUTO: 1.5 10E9/L (ref 0–1.3)
MONOCYTES NFR BLD AUTO: 15.7 %
NEUTROPHILS # BLD AUTO: 6.8 10E9/L (ref 1.6–8.3)
NEUTROPHILS NFR BLD AUTO: 70 %
NITRATE UR QL: NEGATIVE
NRBC # BLD AUTO: 0 10*3/UL
NRBC BLD AUTO-RTO: 0 /100
PH UR STRIP: 7 PH (ref 5–7)
PLATELET # BLD AUTO: 178 10E9/L (ref 150–450)
POTASSIUM SERPL-SCNC: 3.8 MMOL/L (ref 3.4–5.3)
PROT SERPL-MCNC: 6.3 G/DL (ref 6.8–8.8)
RBC # BLD AUTO: 3.25 10E12/L (ref 4.4–5.9)
RBC #/AREA URNS AUTO: 2 /HPF (ref 0–2)
SARS-COV-2 RNA RESP QL NAA+PROBE: NEGATIVE
SODIUM SERPL-SCNC: 140 MMOL/L (ref 133–144)
SOURCE: ABNORMAL
SP GR UR STRIP: 1.02 (ref 1–1.03)
SPECIMEN SOURCE: NORMAL
SQUAMOUS #/AREA URNS AUTO: <1 /HPF (ref 0–1)
UROBILINOGEN UR STRIP-MCNC: 0 MG/DL (ref 0–2)
WBC # BLD AUTO: 9.7 10E9/L (ref 4–11)
WBC #/AREA URNS AUTO: 22 /HPF (ref 0–5)

## 2021-05-26 PROCEDURE — 87086 URINE CULTURE/COLONY COUNT: CPT | Performed by: FAMILY MEDICINE

## 2021-05-26 PROCEDURE — 74177 CT ABD & PELVIS W/CONTRAST: CPT

## 2021-05-26 PROCEDURE — 83690 ASSAY OF LIPASE: CPT | Performed by: FAMILY MEDICINE

## 2021-05-26 PROCEDURE — 250N000011 HC RX IP 250 OP 636: Performed by: PHYSICIAN ASSISTANT

## 2021-05-26 PROCEDURE — 250N000013 HC RX MED GY IP 250 OP 250 PS 637: Performed by: FAMILY MEDICINE

## 2021-05-26 PROCEDURE — 87635 SARS-COV-2 COVID-19 AMP PRB: CPT | Performed by: FAMILY MEDICINE

## 2021-05-26 PROCEDURE — 96374 THER/PROPH/DIAG INJ IV PUSH: CPT | Mod: 59 | Performed by: FAMILY MEDICINE

## 2021-05-26 PROCEDURE — 76705 ECHO EXAM OF ABDOMEN: CPT | Mod: 26 | Performed by: FAMILY MEDICINE

## 2021-05-26 PROCEDURE — 99285 EMERGENCY DEPT VISIT HI MDM: CPT | Mod: 25 | Performed by: FAMILY MEDICINE

## 2021-05-26 PROCEDURE — 250N000009 HC RX 250: Performed by: FAMILY MEDICINE

## 2021-05-26 PROCEDURE — 82077 ASSAY SPEC XCP UR&BREATH IA: CPT | Performed by: FAMILY MEDICINE

## 2021-05-26 PROCEDURE — 81001 URINALYSIS AUTO W/SCOPE: CPT | Performed by: FAMILY MEDICINE

## 2021-05-26 PROCEDURE — 87506 IADNA-DNA/RNA PROBE TQ 6-11: CPT | Performed by: FAMILY MEDICINE

## 2021-05-26 PROCEDURE — 83605 ASSAY OF LACTIC ACID: CPT | Performed by: FAMILY MEDICINE

## 2021-05-26 PROCEDURE — 250N000011 HC RX IP 250 OP 636: Performed by: FAMILY MEDICINE

## 2021-05-26 PROCEDURE — 76705 ECHO EXAM OF ABDOMEN: CPT | Performed by: FAMILY MEDICINE

## 2021-05-26 PROCEDURE — 80053 COMPREHEN METABOLIC PANEL: CPT | Performed by: FAMILY MEDICINE

## 2021-05-26 PROCEDURE — 85025 COMPLETE CBC W/AUTO DIFF WBC: CPT | Performed by: FAMILY MEDICINE

## 2021-05-26 PROCEDURE — C9803 HOPD COVID-19 SPEC COLLECT: HCPCS | Performed by: FAMILY MEDICINE

## 2021-05-26 PROCEDURE — 71046 X-RAY EXAM CHEST 2 VIEWS: CPT

## 2021-05-26 PROCEDURE — 87493 C DIFF AMPLIFIED PROBE: CPT | Performed by: FAMILY MEDICINE

## 2021-05-26 RX ORDER — LANOLIN ALCOHOL/MO/W.PET/CERES
100 CREAM (GRAM) TOPICAL ONCE
Status: COMPLETED | OUTPATIENT
Start: 2021-05-26 | End: 2021-05-26

## 2021-05-26 RX ORDER — FOLIC ACID 1 MG/1
1 TABLET ORAL ONCE
Status: COMPLETED | OUTPATIENT
Start: 2021-05-26 | End: 2021-05-26

## 2021-05-26 RX ORDER — MAGNESIUM OXIDE 400 MG/1
800 TABLET ORAL ONCE
Status: COMPLETED | OUTPATIENT
Start: 2021-05-26 | End: 2021-05-26

## 2021-05-26 RX ORDER — ONDANSETRON 4 MG/1
4 TABLET, ORALLY DISINTEGRATING ORAL EVERY 8 HOURS PRN
Qty: 10 TABLET | Refills: 0 | Status: SHIPPED | OUTPATIENT
Start: 2021-05-26 | End: 2021-05-29

## 2021-05-26 RX ORDER — IOPAMIDOL 755 MG/ML
88 INJECTION, SOLUTION INTRAVASCULAR ONCE
Status: COMPLETED | OUTPATIENT
Start: 2021-05-26 | End: 2021-05-26

## 2021-05-26 RX ORDER — ONDANSETRON 2 MG/ML
4 INJECTION INTRAMUSCULAR; INTRAVENOUS ONCE
Status: COMPLETED | OUTPATIENT
Start: 2021-05-26 | End: 2021-05-26

## 2021-05-26 RX ORDER — MULTIVITAMIN,THERAPEUTIC
1 TABLET ORAL ONCE
Status: COMPLETED | OUTPATIENT
Start: 2021-05-26 | End: 2021-05-26

## 2021-05-26 RX ADMIN — SODIUM CHLORIDE 62 ML: 9 INJECTION, SOLUTION INTRAVENOUS at 12:59

## 2021-05-26 RX ADMIN — THIAMINE HCL TAB 100 MG 100 MG: 100 TAB at 12:53

## 2021-05-26 RX ADMIN — IOPAMIDOL 88 ML: 755 INJECTION, SOLUTION INTRAVENOUS at 12:59

## 2021-05-26 RX ADMIN — ONDANSETRON 4 MG: 2 INJECTION INTRAMUSCULAR; INTRAVENOUS at 11:35

## 2021-05-26 RX ADMIN — MAGNESIUM OXIDE 800 MG: 400 TABLET ORAL at 12:52

## 2021-05-26 RX ADMIN — MULTIVITAMIN TABLET 1 TABLET: TABLET at 12:53

## 2021-05-26 RX ADMIN — FOLIC ACID 1 MG: 1 TABLET ORAL at 12:53

## 2021-05-26 ASSESSMENT — ENCOUNTER SYMPTOMS
DIAPHORESIS: 0
FREQUENCY: 0
SORE THROAT: 0
FEVER: 0
HEADACHES: 0
DYSURIA: 0
CONSTIPATION: 0
PALPITATIONS: 0
VOMITING: 1
SHORTNESS OF BREATH: 0
SINUS PRESSURE: 0
WHEEZING: 0
NAUSEA: 1
BLOOD IN STOOL: 0
CHILLS: 0
ABDOMINAL PAIN: 1
COUGH: 0
DIARRHEA: 1

## 2021-05-26 NOTE — ED PROVIDER NOTES
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 with the patient.       New Prescriptions    No medications on file       Final diagnoses:   Gastroenteritis   Abnormal urine - await urine culture.   Alcoholic cirrhosis of liver with ascites (H) - bedside ultrasound is without  significant fluid for bedside paracentesis  and SBP check.  the LLQ has a 2-3 cm pocket, and could be attempted, but  would be more difficult.  testing is reassuring today and I am more suspicious of other cause.   await vc diff and stool testing.  zofran for nausea.  recheck  with primary provider by friday - we will assist in helping with follow-up.  return here for worsening.       5/26/2021   [facility] EMERGENCY DEPT     Dillon Gilliland MD  05/26/21 2001

## 2021-05-26 NOTE — DISCHARGE INSTRUCTIONS
ICD-10-CM    1. Gastroenteritis  K52.9    2. Abnormal urine  R82.90     await urine culture.   3. Alcoholic cirrhosis of liver with ascites (H)  K70.31     bedside ultrasound is without  significant fluid for bedside paracentesis  and SBP check.  the LLQ has a 2-3 cm pocket, and could be attempted, but  would be more difficult.  testing is reassuring today and I am more suspicious of other cause.   await vc diff and stool testing.  zofran for nausea.  recheck  with primary provider by friday - we will assist in helping with follow-up.  return here for worsening.

## 2021-05-26 NOTE — ED NOTES
Pt states doesn't have anyone to call for a ride home, pt has Rowena and Jerry listed under emergency contact, Jerry unable to come, unable to leave voicemail for Rowena. Pt states he has no money for a cab ride either.

## 2021-05-26 NOTE — ED TRIAGE NOTES
Nausea, vomiting, diarrhea, chills since last night. BG - 98.  Patient had second Moderna COVID vaccine 2 weeks ago.

## 2021-05-27 LAB
BACTERIA SPEC CULT: NO GROWTH
BACTERIA SPEC CULT: NO GROWTH
C COLI+JEJUNI+LARI FUSA STL QL NAA+PROBE: NOT DETECTED
C DIFF TOX B STL QL: NEGATIVE
EC STX1 GENE STL QL NAA+PROBE: NOT DETECTED
EC STX2 GENE STL QL NAA+PROBE: NOT DETECTED
ENTERIC PATHOGEN COMMENT: NORMAL
Lab: NORMAL
Lab: NORMAL
NOROV GI+II ORF1-ORF2 JNC STL QL NAA+PR: NOT DETECTED
RVA NSP5 STL QL NAA+PROBE: NOT DETECTED
SALMONELLA SP RPOD STL QL NAA+PROBE: NOT DETECTED
SHIGELLA SP+EIEC IPAH STL QL NAA+PROBE: NOT DETECTED
SPECIMEN SOURCE: NORMAL
V CHOL+PARA RFBL+TRKH+TNAA STL QL NAA+PR: NOT DETECTED
Y ENTERO RECN STL QL NAA+PROBE: NOT DETECTED

## 2021-05-27 NOTE — RESULT ENCOUNTER NOTE
Final Clostridium Difficile toxin B PCR is NEGATIVE.    No treatment or change in treatment per [facility] ED Lab Result Clostridium difficile protocol.

## 2021-05-27 NOTE — RESULT ENCOUNTER NOTE
[facility] Emergency Dept discharge antibiotic (if prescribed): None  No changes in treatment per [facility] ED Lab Result Urine culture protocol.

## 2021-05-27 NOTE — RESULT ENCOUNTER NOTE
Final Enteric Bacteria and Virus Panel by [facility]KK[facility] Stool is NEGATIVE for Campylobacter group, Salmonella species, Shigella species, Shiga toxin 1 gene, Shiga toxin 2 gene, Vibrio group,  Yersinia enterocolitica,  Rotavirus A,  and Norovirus I and II.  No treatment or change in treatment per [facility] Lab Result Enteric Bacteria and Virus Panel protocol.

## 2021-06-02 ENCOUNTER — TELEPHONE (OUTPATIENT)
Dept: FAMILY MEDICINE | Facility: CLINIC | Age: 61
End: 2021-06-02

## 2021-06-02 NOTE — TELEPHONE ENCOUNTER
He has been to the ER multiple times recently but has had multiple no shows for clinic appointments.  I would not recommend ibuprofen due to his CKD and GI bleed history, but rather he should stick to Tylenol.  Up to 3000mg per day is okay even with his liver disease.  He should schedule follow-up with his orthopedic surgery for further eval of the knee problem.    Thanks,  Chidi Valenzuela MD

## 2021-06-02 NOTE — TELEPHONE ENCOUNTER
Reason for Call:  Medication or medication refill:    Do you use a [facility]?  Name of the pharmacy and phone number for the current request:  [facility] - [phone]    Name of the medication requested: Ibuprofen    Other request: RN reports patient has been non-compliant taking medications and also drinking alcohol. Patient has had several falls in the past 2 weeks, reports 10/10 knee pain, therefore requesting ibuprofen.    Can we leave a detailed message on this number? YES    Phone number patient can be reached at: Home number on file [phone] (home)    Best Time: any    Call taken on 6/2/2021 at 10:19 AM by Leonor Amaro

## 2021-06-02 NOTE — TELEPHONE ENCOUNTER
I called the patient he said he had knee surgery and the knee is so painful he can hardly move it. He sounds intoxicated. I called the nurse he was drinking a beer when she was there this morning. It is not swollen it is pink and warm to touch. It does not look Infected . She sees him weekly.  Margo Angulo RN

## 2021-06-09 ENCOUNTER — TELEPHONE (OUTPATIENT)
Dept: FAMILY MEDICINE | Facility: CLINIC | Age: 61
End: 2021-06-09

## 2021-06-09 ENCOUNTER — MEDICAL CORRESPONDENCE (OUTPATIENT)
Dept: HEALTH INFORMATION MANAGEMENT | Facility: CLINIC | Age: 61
End: 2021-06-09

## 2021-06-09 NOTE — TELEPHONE ENCOUNTER
Reason for Call:  Other     Detailed comments: Patient had left knee surgery two weeks ago, it hurst and home care has not visited, he doesn't have the pills he needs today, wants BRIGITTE RN.    Phone Number Patient can be reached at: Cell number on file:    Telephone Information:   Mobile [phone]       Best Time: Any    Can we leave a detailed message on this number? YES    Call taken on 6/9/2021 at 2:07 PM by Lia Montalvo

## 2021-06-11 NOTE — TELEPHONE ENCOUNTER
RN sees another TE dated 6/2/21 between one of our RNs and home care RN stating that patient has been non-compliant with medications, drinking alcohol and had several falls.  Provider recommended avoiding ibuprofen for pain due to CKD and GI bleed history, stated he could take Tylenol 3000 mg daily even with liver disease, then schedule f/u with ortho.  Writing RN does not see any f/u scheduled or completed.    RN made 3rd attempt to return call to patient with no answer. Left VM stating he could return our call, or better and more appropriate to reach out to his orthopedic team for f/u regarding his ongoing knee concerns.   Closing encounter at this time, since 3 attempts made to reach patient.     Linda Dockery RN  [facility]

## 2021-06-14 ENCOUNTER — PRE VISIT (OUTPATIENT)
Dept: UROLOGY | Facility: CLINIC | Age: 61
End: 2021-06-14

## 2021-06-14 NOTE — TELEPHONE ENCOUNTER
Reason for visit: 3 month follow-up with CT      Dx/Hx/Sx: kidney stones    Records/imaging/labs/orders: CT scheduled 6/16    At Rooming: telephone return

## 2021-06-15 DIAGNOSIS — M79.642 PAIN IN BOTH HANDS: ICD-10-CM

## 2021-06-15 DIAGNOSIS — M79.641 PAIN IN BOTH HANDS: ICD-10-CM

## 2021-06-15 RX ORDER — PSEUDOEPHED/ACETAMINOPH/DIPHEN 30MG-500MG
TABLET ORAL
Qty: 100 TABLET | Refills: 1 | Status: SHIPPED | OUTPATIENT
Start: 2021-06-15 | End: 2021-08-11

## 2021-07-01 ENCOUNTER — ALLIED HEALTH/NURSE VISIT (OUTPATIENT)
Dept: AUDIOLOGY | Facility: CLINIC | Age: 61
End: 2021-07-01
Payer: COMMERCIAL

## 2021-07-01 DIAGNOSIS — H90.3 SENSORINEURAL HEARING LOSS, ASYMMETRICAL: Primary | ICD-10-CM

## 2021-07-01 PROCEDURE — 99207 PR NO CHARGE LOS: CPT | Performed by: AUDIOLOGIST

## 2021-07-01 PROCEDURE — V5299 HEARING SERVICE: HCPCS | Performed by: AUDIOLOGIST

## 2021-07-01 NOTE — PROGRESS NOTES
[facility]        SUBJECTIVE:  Abhijeet Mccauley , 60 year old male requests in office repair of his 2019 Phonak Bolero B50-AK hearing aids. He reports the hearing aids are not working.     OBJECTIVE:  An otoscopic examination was done and revealed a sore on his right outer ear. He reports he scratched his ear.     Replaced plugged earmold tubing and earhooks bilaterally. Cleaned very dirty charging case.     Verified hearing aid functionality.      ASSESSMENT/PLAN:    Return to clinic as needed.     [facility], #[id number]

## 2021-07-14 ENCOUNTER — TELEPHONE (OUTPATIENT)
Dept: FAMILY MEDICINE | Facility: CLINIC | Age: 61
End: 2021-07-14

## 2021-07-14 NOTE — TELEPHONE ENCOUNTER
"Patient asking for appointment to see Dr. Valenzuela for follow up on knee surgery.  Reports the leg is very red, slightly swollen.  Patient sounds intoxicated during call.  Patient has not seen ortho since his surgery.  Claims no one is helping him, has not been able to see home care or Dr. Valenzuela.  Appointment offered for tomorrow or Friday.  Patient asking to be seen next week because he can't fit it in this week.  Advised Dr. Valenzuela out of office next week.  Patient raised his voice, \"Again?  How many vacations does she need?\"  Advised patient that is not something we can discuss, provider is simply not available.  Offered to schedule with another provider, patient declines.  Appointment made for this Friday with Dr. Valenzuela. Of note, patient has no showed 6 appointments since May.    Kierra Lewis RN    "

## 2021-07-17 ENCOUNTER — NURSE TRIAGE (OUTPATIENT)
Dept: NURSING | Facility: CLINIC | Age: 61
End: 2021-07-17

## 2021-07-17 NOTE — TELEPHONE ENCOUNTER
Pt called stating he had a knee surgery almost 2 months ago, and his left  knee up to the foot. Pt reported he wants to know what causes the swelling and why?  Pt rated his pain level 9/10. Pt reported he takes 500 mh tylenol.     Per protocal pt was advised to be seen within 4 hours, pt refused, and said he would like to schedule appointment on Monday. Pt was transferred to scheduling.      Michael Pierson RN  [facility]     COVID 19 Nurse Triage Plan/Patient Instructions    Please be aware that novel coronavirus (COVID-19) may be circulating in the community. If you develop symptoms such as fever, cough, or SOB or if you have concerns about the presence of another infection including coronavirus (COVID-19), please contact your health care provider or visit [email or web address].     Disposition/Instructions    In-Person Visit with provider recommended. Reference Visit Selection Guide.    Thank you for taking steps to prevent the spread of this virus.  o Limit your contact with others.  o Wear a simple mask to cover your cough.  o Wash your hands well and often.    Resources    [facility]: About COVID-19: [email or web address]    CDC: What to Do If You're Sick: www.cdc.gov/coronavirus/2019-ncov/about/steps-when-sick.html    CDC: Ending Home Isolation: www.cdc.gov/coronavirus/2019-ncov/hcp/disposition-in-home-patients.html     CDC: Caring for Someone: www.cdc.gov/coronavirus/2019-ncov/if-you-are-sick/care-for-someone.html     [facility]: Interim Guidance for Hospital Discharge to Home: www.health.[facility].mn.us/diseases/coronavirus/hcp/hospdischarge.pdf    [facility] clinical trials (COVID-19 research studies): clinicalaffairs.[facility]-clinical-trials     Below are the COVID-19 hotlines at the [facility] ([facility]). Interpreters are available.   o For health questions: Call 442-460-0129 or 1-271.707.7979 (7 a.m. to 7 p.m.)  o For questions about  schools and childcare: Call 968-466-6248 or 1-668.892.8027 (7 a.m. to 7 p.m.)                           Reason for Disposition    [1] SEVERE pain (e.g., excruciating, unable to walk) AND [2] not improved after 2 hours of pain medicine    Additional Information    Negative: Fever    Negative: Patient sounds very sick or weak to the triager    Negative: Followed a knee injury    Negative: Thigh or calf swelling is main symptom    Negative: Knee pain is main symptom    Protocols used: KNEE SWELLING-A-AH

## 2021-07-28 DIAGNOSIS — G47.00 INSOMNIA, UNSPECIFIED TYPE: Primary | ICD-10-CM

## 2021-07-28 DIAGNOSIS — M47.818 OSTEOARTHRITIS OF SPINE WITHOUT MYELOPATHY OR RADICULOPATHY, SACRAL AND SACROCOCCYGEAL REGION: ICD-10-CM

## 2021-07-28 DIAGNOSIS — F10.20 ALCOHOL USE DISORDER, SEVERE, DEPENDENCE (H): ICD-10-CM

## 2021-07-28 NOTE — TELEPHONE ENCOUNTER
Pt needs meds to get to his appt. On 8/27/21. Pt also want melatonin.     Carmina [place] Sec

## 2021-07-29 DIAGNOSIS — I25.10 ASCVD (ARTERIOSCLEROTIC CARDIOVASCULAR DISEASE): ICD-10-CM

## 2021-07-29 NOTE — PROGRESS NOTES
Pre procedure Diagnosis: chronic right hip pain, right hip trochanteric bursitis, myofascial pain, chronic neck pain   Post procedure Diagnosis: Same  Procedure performed: right hip greater trochanteric bursa injection and cervical trigger point injections  Indication: Therapeutic for pain  Anesthesia: none  Complications: none  Operators: Landen Quiñones MD      Indications:   Abhijeet Mccauley is a 57 year old year old male was sent by Dr. Funk for treatment of chronic pain. The patient has a history of chronic neck pain and chronic right hip pain. Exam shows tenderness to palpation along the cervical paraspinal muscles with palpable spasm and at the right hip greater trochanter. Other conservative treatments prior to injection include physical therapy, medications, and prior injections.     Options/alternatives, benefits and risks were discussed with the patient including bleeding, infection, tissue trauma, exposure to radiation, reaction to medications including seizure, nerve injury, weakness, and numbness. Questions were answered to their satisfaction and they agreed to proceed. Voluntary informed consent was obtained and signed.      Vitals were reviewed: Yes  /73  Pulse 64  Allergies were reviewed: Yes   Medications were reviewed: Yes   Pre-procedure pain score: 10/10     Procedure:  After obtaining signed informed consent, the patient was positioned in a left lateral decubitus position.  A Pause for the Cause was performed.      After identifying the point of maximal tenderness at the right greater trochanter, the area was prepped in the usual sterile fashion. Then, a 27 gauge 3.5 inch spinal needle was advanced to contact bone at the right greater trochanter with positive pain reproduction and withdrawn 1-2 mm. Aspiration was negative. Then, 5 ml of a combination of Kenalog 20 mg and Bupivicaine 0.5% 4.5 ml was injected into the bursa and the needle was withdrawn. The injection site was  Problem: Mobility Impaired (Adult and Pediatric)  Goal: *Acute Goals and Plan of Care (Insert Text)  Description: FUNCTIONAL STATUS PRIOR TO ADMISSION: Per chart pt performed assisted transfers to/from commode and wheelchair. On admission spouse reporting inability to provide sufficient care to pt at home (per chart). HOME SUPPORT PRIOR TO ADMISSION: The patient lived with spouse. Physical Therapy Goals  Initiated 7/28/2021  1. Patient will move from supine to sit and sit to supine  in bed with supervision/set-up within 7 day(s). 2.  Patient will transfer from bed to chair and chair to bed with minimal assistance/contact guard assist 4/4 trials using the least restrictive device within 7 day(s). 3.  Patient will perform sit to stand with minimal assistance/contact guard assist 4/4 trials within 7 day(s). 4.  Patient will ambulate with minimal assistance/contact guard assist for 50 feet with the least restrictive device within 7 day(s). 5.  Patient will ascend/descend 6 stairs with one handrail(s) with moderate assistance  within 7 day(s). Outcome: Not Met  PHYSICAL THERAPY TREATMENT  Patient: Isaiah Hart (70 y.o. male)  Date: 7/29/2021  Diagnosis: Weakness [R53.1] Weakness       Precautions: Fall, Skin  Chart, physical therapy assessment, plan of care and goals were reviewed. ASSESSMENT  Patient continues with skilled PT services and is progressing towards goals slowly. Pt presents with improved intelligibility of speech today, increased frequency of verbal responses to questions, and good participation. He dangles edge of bed x 10 mins, transfers x 2, and performs static standing activities for hygiene. Current Level of Function Impacting Discharge (mobility/balance): max assist    Other factors to consider for discharge: none additional         PLAN :  Patient continues to benefit from skilled intervention to address the above impairments.   Continue treatment per established plan cleaned and a bandaid was placed.     Then, the patient was placed in a seated position.  Trigger points were identified along the cervical spine and thoracic spine.  The area was cleaned with Chloroprep.  Then, injections were performed with a 30 gauge 1 inch needle at 6 sites with injection of 1 ml of a combination of Bupivicaine 0.5% 5 ml and Lidocaine 1% 5 ml at each site.        The patient tolerated the procedure well, and was taken to the recovery room.      Post-procedure pain score: 6-7/10  Follow-up includes:   -f/u phone call in one week  -f/u with PCP and in the pain clinic as scheduled     Landen Quiñones MD  [facility]     of care. to address goals. Recommendation for discharge: (in order for the patient to meet his/her long term goals)  Therapy up to 5 days/week in SNF setting at current functional level or intensive home health therapy program with 24-hour assist pending progress with PT. Recommend stretcher transportation    This discharge recommendation:  Has been made in collaboration with the attending provider and/or case management    IF patient discharges home will need the following DME: to be determined (TBD)       SUBJECTIVE:   Patient stated I think I already went, re: need to toilet. Pt received supine, agreeable to PT and cleared by RN. OBJECTIVE DATA SUMMARY:   Critical Behavior:  Neurologic State: Alert  Orientation Level: Oriented to person  Cognition: Follows commands  Safety/Judgement: Decreased awareness of environment    Skin:  Noted small area of open skin, scant serous drainage to L medial heel. RN notified. Functional Mobility Training:  Bed Mobility:     Supine to Sit: Additional time;Contact guard assistance;Bed Modified (supine to long-sit)     Scooting: Maximum assistance (forward toward edge of bed; freezes with distractions)        Transfers:  Sit to Stand: Additional time (max assist 1/2 trials; mod assist 1/2 trials)  Stand to Sit: Additional time; Moderate assistance         Additional time for weight shifts, cues for anterior lean/great toe proprioception            Total assist lower body hygiene.         Balance:  Sitting: Impaired (sits EOB x 10 mins with up to min assist , verbal cues)  Sitting - Static: Fair (occasional) (posterior lean)  Sitting - Dynamic: Poor (constant support)  Standing: Impaired (stands x 1 min and x 2 mins)  Standing - Static: Poor (brief periods of fair; posterior lean)  Standing - Dynamic : Poor  Ambulation/Gait Training:  Distance (ft): 5 Feet (ft)  Assistive Device: Walker, rolling;Gait belt  Ambulation - Level of Assistance: Minimal assistance Gait Abnormalities: Decreased step clearance        Base of Support: Narrowed     Speed/Kim: Pace decreased (<100 feet/min)  Step Length: Left shortened;Right shortened                  Cues for widened base of support               Therapeutic Exercises: Ankle pumps x 15  SLR x 5 and x 3 FANTASMA Bond    Pain Rating:  Denies pain    Activity Tolerance:   requires rest breaks    After treatment patient left in no apparent distress:   Sitting in chair, Bed / chair alarm activated, and LEs elevated; chair at nurse's desk    COMMUNICATION/COLLABORATION:   The patients plan of care was discussed with: Registered nurse and Rehabilitation technician.      Pritesh Khalil, PT, DPT   Time Calculation: 44 mins

## 2021-07-30 RX ORDER — GABAPENTIN 300 MG/1
300 CAPSULE ORAL 2 TIMES DAILY
Qty: 60 CAPSULE | Refills: 0 | Status: SHIPPED | OUTPATIENT
Start: 2021-07-30 | End: 2021-08-27

## 2021-07-30 RX ORDER — LANOLIN ALCOHOL/MO/W.PET/CERES
3 CREAM (GRAM) TOPICAL
Qty: 30 TABLET | Refills: 0 | Status: SHIPPED | OUTPATIENT
Start: 2021-07-30 | End: 2021-08-31

## 2021-07-30 RX ORDER — FOLIC ACID 1 MG/1
1 TABLET ORAL DAILY
Qty: 90 TABLET | Refills: 0 | Status: SHIPPED | OUTPATIENT
Start: 2021-07-30 | End: 2021-09-28

## 2021-07-30 NOTE — TELEPHONE ENCOUNTER
One refill of meds sent.  Prescription for melatonin sent, though computer indicates his insurance may not cover this.    Thanks,  Chidi Valenzuela MD

## 2021-08-02 RX ORDER — ATORVASTATIN CALCIUM 20 MG/1
20 TABLET, FILM COATED ORAL DAILY
Qty: 30 TABLET | Refills: 0 | Status: SHIPPED | OUTPATIENT
Start: 2021-08-02 | End: 2021-08-27

## 2021-08-02 NOTE — TELEPHONE ENCOUNTER
Medication is being filled for 1 time refill only due to:  Has appt end of month with Dr. Valenzuela     Lab Results   Component Value Date    CHOL 200 02/19/2020     Lab Results   Component Value Date    HDL 76 02/19/2020     Lab Results   Component Value Date     02/19/2020     Lab Results   Component Value Date    TRIG 75 02/19/2020     Lab Results   Component Value Date    CHOLHDLRATIO 7.4 09/01/2009       Angelina HDZ RN, BSN

## 2021-08-03 DIAGNOSIS — F10.20 ALCOHOL USE DISORDER, SEVERE, DEPENDENCE (H): ICD-10-CM

## 2021-08-03 DIAGNOSIS — M79.642 PAIN IN BOTH HANDS: ICD-10-CM

## 2021-08-03 DIAGNOSIS — M79.641 PAIN IN BOTH HANDS: ICD-10-CM

## 2021-08-03 DIAGNOSIS — M84.361A STRESS FRACTURE OF RIGHT TIBIA, INITIAL ENCOUNTER: ICD-10-CM

## 2021-08-07 NOTE — TELEPHONE ENCOUNTER
Routing refill request to Provider as there is an allergy/contraindication alert.  Yoan Springer RN

## 2021-08-10 NOTE — TELEPHONE ENCOUNTER
Routing refill request to provider for review/approval because:  Thiamine not on protocol.  Is tylenol ok for this patient??

## 2021-08-11 RX ORDER — LANOLIN ALCOHOL/MO/W.PET/CERES
100 CREAM (GRAM) TOPICAL DAILY
Qty: 90 TABLET | Refills: 0 | Status: SHIPPED | OUTPATIENT
Start: 2021-08-11

## 2021-08-11 RX ORDER — ACETAMINOPHEN 500 MG
TABLET ORAL
Qty: 100 TABLET | Refills: 0 | Status: SHIPPED | OUTPATIENT
Start: 2021-08-11 | End: 2021-09-01

## 2021-08-24 ENCOUNTER — TELEPHONE (OUTPATIENT)
Dept: FAMILY MEDICINE | Facility: CLINIC | Age: 61
End: 2021-08-24

## 2021-08-24 NOTE — TELEPHONE ENCOUNTER
Reason for Call:  Other     Detailed comments: Jenise RN with [employer] is calling to report patient non compliance. Patient reported to Jenise that he is having blood in his bowel and when he is wiping and was recommended by RN to go to ER for evaluation but patient refused. And also patient was taking Gabapentin more than the prescribed dosage because of his pain and wont follow the nurse direction.    Phone Number Patient can be reached at: Other phone number: [phone]    Best Time: any    Can we leave a detailed message on this number? YES    Call taken on 8/24/2021 at 5:07 PM by Ghazal Valenzuela

## 2021-08-26 NOTE — TELEPHONE ENCOUNTER
Noted, and I'm not surprised.  Would advise going to ER with worsening symptoms and continue to try to reinforce correct dosing.    Thanks,  Chidi Valenzuela MD

## 2021-08-26 NOTE — TELEPHONE ENCOUNTER
"Called [facility] [phone] and message below relayed.  \"I'll call him and I'll let him know.\"  Angelina HDZ RN, BSN      "

## 2021-08-27 ENCOUNTER — OFFICE VISIT (OUTPATIENT)
Dept: FAMILY MEDICINE | Facility: CLINIC | Age: 61
End: 2021-08-27
Payer: COMMERCIAL

## 2021-08-27 VITALS
SYSTOLIC BLOOD PRESSURE: 124 MMHG | RESPIRATION RATE: 16 BRPM | OXYGEN SATURATION: 95 % | WEIGHT: 175.2 LBS | BODY MASS INDEX: 28.28 KG/M2 | DIASTOLIC BLOOD PRESSURE: 78 MMHG | TEMPERATURE: 97.9 F | HEART RATE: 102 BPM

## 2021-08-27 DIAGNOSIS — M25.562 CHRONIC PAIN OF LEFT KNEE: Primary | ICD-10-CM

## 2021-08-27 DIAGNOSIS — K70.30 ALCOHOLIC CIRRHOSIS, UNSPECIFIED WHETHER ASCITES PRESENT (H): ICD-10-CM

## 2021-08-27 DIAGNOSIS — M47.818 OSTEOARTHRITIS OF SPINE WITHOUT MYELOPATHY OR RADICULOPATHY, SACRAL AND SACROCOCCYGEAL REGION: ICD-10-CM

## 2021-08-27 DIAGNOSIS — J45.20 MILD INTERMITTENT ASTHMA WITHOUT COMPLICATION: ICD-10-CM

## 2021-08-27 DIAGNOSIS — K62.5 BRBPR (BRIGHT RED BLOOD PER RECTUM): ICD-10-CM

## 2021-08-27 DIAGNOSIS — G89.29 CHRONIC PAIN OF LEFT KNEE: Primary | ICD-10-CM

## 2021-08-27 DIAGNOSIS — I25.10 ASCVD (ARTERIOSCLEROTIC CARDIOVASCULAR DISEASE): ICD-10-CM

## 2021-08-27 LAB
ALBUMIN SERPL-MCNC: 3.5 G/DL (ref 3.4–5)
ALP SERPL-CCNC: 625 U/L (ref 40–150)
ALT SERPL W P-5'-P-CCNC: 42 U/L (ref 0–70)
ANION GAP SERPL CALCULATED.3IONS-SCNC: 11 MMOL/L (ref 3–14)
AST SERPL W P-5'-P-CCNC: 108 U/L (ref 0–45)
BILIRUB SERPL-MCNC: 2.3 MG/DL (ref 0.2–1.3)
BUN SERPL-MCNC: 16 MG/DL (ref 7–30)
CALCIUM SERPL-MCNC: 9.1 MG/DL (ref 8.5–10.1)
CHLORIDE BLD-SCNC: 106 MMOL/L (ref 94–109)
CHOLEST SERPL-MCNC: 213 MG/DL
CO2 SERPL-SCNC: 24 MMOL/L (ref 20–32)
CREAT SERPL-MCNC: 2.39 MG/DL (ref 0.66–1.25)
ERYTHROCYTE [DISTWIDTH] IN BLOOD BY AUTOMATED COUNT: 21.9 % (ref 10–15)
FASTING STATUS PATIENT QL REPORTED: ABNORMAL
GFR SERPL CREATININE-BSD FRML MDRD: 28 ML/MIN/1.73M2
GLUCOSE BLD-MCNC: 98 MG/DL (ref 70–99)
HCT VFR BLD AUTO: 39.4 % (ref 40–53)
HDLC SERPL-MCNC: 111 MG/DL
HGB BLD-MCNC: 12.7 G/DL (ref 13.3–17.7)
LDLC SERPL CALC-MCNC: 83 MG/DL
MCH RBC QN AUTO: 32.6 PG (ref 26.5–33)
MCHC RBC AUTO-ENTMCNC: 32.2 G/DL (ref 31.5–36.5)
MCV RBC AUTO: 101 FL (ref 78–100)
NONHDLC SERPL-MCNC: 102 MG/DL
PLATELET # BLD AUTO: 94 10E3/UL (ref 150–450)
POTASSIUM BLD-SCNC: 3.1 MMOL/L (ref 3.4–5.3)
PROT SERPL-MCNC: 7.6 G/DL (ref 6.8–8.8)
RBC # BLD AUTO: 3.9 10E6/UL (ref 4.4–5.9)
SODIUM SERPL-SCNC: 141 MMOL/L (ref 133–144)
TRIGL SERPL-MCNC: 94 MG/DL
WBC # BLD AUTO: 9.8 10E3/UL (ref 4–11)

## 2021-08-27 PROCEDURE — 85027 COMPLETE CBC AUTOMATED: CPT | Performed by: INTERNAL MEDICINE

## 2021-08-27 PROCEDURE — 80053 COMPREHEN METABOLIC PANEL: CPT | Performed by: INTERNAL MEDICINE

## 2021-08-27 PROCEDURE — 80061 LIPID PANEL: CPT | Performed by: INTERNAL MEDICINE

## 2021-08-27 PROCEDURE — 36415 COLL VENOUS BLD VENIPUNCTURE: CPT | Performed by: INTERNAL MEDICINE

## 2021-08-27 PROCEDURE — 99214 OFFICE O/P EST MOD 30 MIN: CPT | Performed by: INTERNAL MEDICINE

## 2021-08-27 RX ORDER — ALBUTEROL SULFATE 90 UG/1
2 AEROSOL, METERED RESPIRATORY (INHALATION) EVERY 4 HOURS PRN
Qty: 18 G | Refills: 11 | Status: SHIPPED | OUTPATIENT
Start: 2021-08-27 | End: 2021-08-27

## 2021-08-27 RX ORDER — ALBUTEROL SULFATE 90 UG/1
2 AEROSOL, METERED RESPIRATORY (INHALATION) EVERY 4 HOURS PRN
Qty: 18 G | Refills: 11 | Status: SHIPPED | OUTPATIENT
Start: 2021-08-27

## 2021-08-27 RX ORDER — ATORVASTATIN CALCIUM 20 MG/1
20 TABLET, FILM COATED ORAL DAILY
Qty: 90 TABLET | Refills: 3 | Status: SHIPPED | OUTPATIENT
Start: 2021-08-27

## 2021-08-27 RX ORDER — GABAPENTIN 300 MG/1
300 CAPSULE ORAL 3 TIMES DAILY
Qty: 90 CAPSULE | Refills: 3 | Status: SHIPPED | OUTPATIENT
Start: 2021-08-27

## 2021-08-27 ASSESSMENT — PAIN SCALES - GENERAL: PAINLEVEL: WORST PAIN (10)

## 2021-08-27 NOTE — PATIENT INSTRUCTIONS
It is okay to take the gabapentin three times per day- prescription increased.      Let me know what orthopedics says about the leg pain.

## 2021-08-27 NOTE — PROGRESS NOTES
Assessment & Plan     Chronic pain of left knee    Abhijeet presents with months of worsening left leg pain that may be multifactorial.  He certainly has pain around the previously replaced knee but also has lateral hip pain which could be greater trochanteric pain syndrome and radiating symptoms which could be radiculopathy.  Discussed getting knee x-ray today but then Abhijeet informed me that he is seeing ortho next week and he prefers to wait until then.  Advised him to discuss with ortho if they feel there are other contributions to pain in addition to just his knee.     Alcoholic cirrhosis, unspecified whether ascites present (H)    Liver and kidney function both worse today.  He reports he continues to drink a couple of cocktails daily, advise cessation.  Reduced renal function could be hepatorenal syndrome or dehydration from too much diuretic- he seems more dry on exam, so we'll try cutting his Lasix dose in half, continue current michele dose.  Recheck labs next week.  Recommend seeing hepatology.     - furosemide (LASIX) 20 MG tablet; Take 0.5 tablets (10 mg) by mouth daily  - Comprehensive metabolic panel (BMP + Alb, Alk Phos, ALT, AST, Total. Bili, TP); Future  - spironolactone (ALDACTONE) 25 MG tablet; Take 1 tablet (25 mg) by mouth daily  - GASTROENTEROLOGY ADULT REF CONSULT ONLY; Future    BRBPR (bright red blood per rectum)    Abhijeet has had 5 days of red blood in stool.  He has a long history of GI bleeding without definitive source found.  Hgb today actually improved from last check a number of months ago.  He is feeling stable, declined advice from home care to go to ER.  Recommended he follow-up with GI, though he is reluctant.  Advised him to follow-up if bleeding worsens, recheck Hgb next week.     - CBC with platelets  - Hemoglobin; Future  - GASTROENTEROLOGY ADULT REF CONSULT ONLY; Future    Osteoarthritis of spine without myelopathy or radiculopathy, sacral and sacrococcygeal  region    Gabapentin prescribed at BID but Abhijeet has been taking this TID.  We had discussed during appointment that it would be okay to increase to TID, but if GFR continues to be in CKD4 range, 600mg would be max recommended daily dose.  We'll see what GFR is on recheck next week.      - gabapentin (NEURONTIN) 300 MG capsule; Take 1 capsule (300 mg) by mouth 3 times daily    Mild intermittent asthma without complication    Refill sent    - albuterol (VENTOLIN HFA) 108 (90 Base) MCG/ACT inhaler; Inhale 2 puffs into the lungs every 4 hours as needed for shortness of breath / dyspnea or wheezing    Intermediate risk for ASCVD (arteriosclerotic cardiovascular disease)    Labs good, continue current med    - atorvastatin (LIPITOR) 20 MG tablet; Take 1 tablet (20 mg) by mouth daily  - Lipid panel reflex to direct LDL Non-fasting        Return in about 1 week (around 9/3/2021) for Lab appointment.    Chidi Valenzuela MD  [facility]    Singh Ledesma is a 60 year old who presents for the following health issues     HPI       Left leg pain:  Abhijeet had L TKA in April complicated by cellulitis and he reports pain has been ongoing since then, pain worsened with PT after a couple months.  He is having numbness and tingling all the way down the leg.  He has a history of left hip replacement and reports no pain in that area.   He is seeing orthopedics on 8/31 and says they will be doing x-rays then.      Home care RN recently messaged that Abhijeet reported BRB in the stool.  He reports that this has been going on for the past 5 days, this continues though is a bit improved from previous.  He feels fatigued but not dizzy.  Home care advised he go to the ER but he declined.     Home care noted he was using more gabapentin than prescribed. He says he is taking this TID for about three months rather than BID and that is helping.     Lumbar MRI 1/2020:  IMPRESSION:    1. Multilevel degenerative changes throughout  the lumbar spine as  detailed above. Overall, no significant change since MR 3/9/2019.  2. Stable anterior compression deformity of the L1 vertebral body. No  new loss of vertebral body height.  3. No suspicious bony lesions.      Abhijeet reports dark urine, but no dysuria, frequency, urgency, blood in the urine, pelvic pain.  He feels he is drinking enough water.    Smokes 4 cigs per day.  Having a couple cocktails per day.     Review of Systems   Constitutional, GI, MSK systems are negative, except as otherwise noted.      Objective    /78 (BP Location: Right arm, Patient Position: Sitting, Cuff Size: Adult Regular)   Pulse 102   Temp 97.9  F (36.6  C) (Tympanic)   Resp 16   Wt 79.5 kg (175 lb 3.2 oz)   SpO2 95%   BMI 28.28 kg/m    Body mass index is 28.28 kg/m .  Physical Exam   GENERAL: alert and no distress  MSK:  No spine pain to palpation  Lateral left hip pain to palpation.  Hip ROM reduced  Left knee diffuse pain to palpation, reduced ROM, surgical scar present  Lateral thigh and inner calf numbness to light touch sensation

## 2021-08-28 RX ORDER — SPIRONOLACTONE 25 MG/1
25 TABLET ORAL DAILY
Qty: 90 TABLET | Refills: 0 | Status: SHIPPED | OUTPATIENT
Start: 2021-08-28 | End: 2021-09-28

## 2021-08-28 RX ORDER — FUROSEMIDE 20 MG
10 TABLET ORAL DAILY
Qty: 90 TABLET | Refills: 1 | Status: SHIPPED | OUTPATIENT
Start: 2021-08-28

## 2021-08-28 ASSESSMENT — ASTHMA QUESTIONNAIRES: ACT_TOTALSCORE: 21

## 2021-08-31 ENCOUNTER — TRANSFERRED RECORDS (OUTPATIENT)
Dept: HEALTH INFORMATION MANAGEMENT | Facility: CLINIC | Age: 61
End: 2021-08-31

## 2021-08-31 ENCOUNTER — TELEPHONE (OUTPATIENT)
Dept: FAMILY MEDICINE | Facility: CLINIC | Age: 61
End: 2021-08-31

## 2021-08-31 DIAGNOSIS — G47.00 INSOMNIA, UNSPECIFIED TYPE: ICD-10-CM

## 2021-08-31 RX ORDER — LANOLIN ALCOHOL/MO/W.PET/CERES
3 CREAM (GRAM) TOPICAL AT BEDTIME
Qty: 90 TABLET | Refills: 3 | Status: SHIPPED | OUTPATIENT
Start: 2021-08-31

## 2021-08-31 NOTE — TELEPHONE ENCOUNTER
"Jenise from [employer], [phone], called reporting that she is at pt's home setting up medications.  She needs clarification:    1.  Furosemide.  Pt wrote that he is to take \"10 mg daily of Latex.\"  Yes, it appears pt takes 10 mg daily of furosemide.  2.  Aspirin was written from surgeon to be taken for 30 days only after surgery back in April.  Jenise needs discontinue order.  3.  Pt takes melatonin for sleep.  Needs refilled.  No refills on it.  Pt is now taking it every night.  Order scheduled instead of prn?  [facility] in [place]  4.  Both tamsulosin and finasteride are on pt's med list.  Jenise needs to know which medication he is supposed to be taking.  And discontinue the one not taking.  Currently, pt is taking finasteride.    Zulema Gramajo RN    "

## 2021-08-31 NOTE — TELEPHONE ENCOUNTER
1. Yes, Lasix is at 10mg now.  2. Discontinue aspirin, removed from med list.  3. Melatonin refilled, sig changed to every night.   4. I think he is supposed to be on both the finasteride and tamsulosin.  I don't see this addressed directly in a note from urology, but the urologist ordered a year's worth of refills on the tamsulosin in February, so I assume Abhijeet is to continue it along with the finasteride.     Thanks,  Chidi Valenzuela MD

## 2021-09-01 DIAGNOSIS — M79.641 PAIN IN BOTH HANDS: ICD-10-CM

## 2021-09-01 DIAGNOSIS — M79.642 PAIN IN BOTH HANDS: ICD-10-CM

## 2021-09-01 RX ORDER — ACETAMINOPHEN 500 MG
TABLET ORAL
Qty: 100 TABLET | Refills: 5 | Status: SHIPPED | OUTPATIENT
Start: 2021-09-01

## 2021-09-01 NOTE — TELEPHONE ENCOUNTER
Pending Prescriptions:                       Disp   Refills    acetaminophen (TYLENOL) 500 MG tablet [Pha*100 ta*5        Sig: TAKE 1-2 TABLETS BY MOUTH EVERY 6 HOURS AS NEEDED FOR           MILD PAIN. DO NOT TAKE MORE THEN 3000MG (6TABS)           ACETAMINOPHEN TOTAL IN ONE DAY  Routed to Dr Valenzuela. 0 refills given on recent prescriptions. Pt asking for further refills.    Darshana Joshua RN

## 2021-09-07 DIAGNOSIS — J30.1 SEASONAL ALLERGIC RHINITIS DUE TO POLLEN: ICD-10-CM

## 2021-09-10 RX ORDER — MONTELUKAST SODIUM 10 MG/1
TABLET ORAL
Qty: 90 TABLET | Refills: 1 | Status: SHIPPED | OUTPATIENT
Start: 2021-09-10

## 2021-09-10 NOTE — TELEPHONE ENCOUNTER
"Prescription approved per [facility] Refill Protocol.  Requested Prescriptions   Pending Prescriptions Disp Refills     montelukast (SINGULAIR) 10 MG tablet [Pharmacy Med Name: MONTELUKAST 10MG] 30 tablet 0     Sig: TAKE 1 TABLET BY MOUTH EVERY NIGHT AT BEDTIME       Leukotriene Inhibitors Protocol Passed - 9/7/2021  4:53 PM        Passed - Patient is age 12 or older     If patient is under 16, ok to refill using age based dosing.           Passed - Asthma control assessment score within normal limits in last 6 months     Please review ACT score.           Passed - Medication is active on med list        Passed - Recent (6 mo) or future (30 days) visit within the authorizing provider's specialty     Patient had office visit in the last 6 months or has a visit in the next 30 days with authorizing provider or within the authorizing provider's specialty.  See \"Patient Info\" tab in inbasket, or \"Choose Columns\" in Meds & Orders section of the refill encounter.                 "

## 2021-09-14 ENCOUNTER — TELEPHONE (OUTPATIENT)
Dept: FAMILY MEDICINE | Facility: CLINIC | Age: 61
End: 2021-09-14

## 2021-09-14 NOTE — TELEPHONE ENCOUNTER
Dr. Valenzuela,    Attempted to call the patient to find out more details but he did not answer.  Patient fell without said injuries.  Please see message below. Catherine WANG RN

## 2021-09-14 NOTE — TELEPHONE ENCOUNTER
Reason for Call:  Home Health Care    Jenise with [facility] called regarding (reason for call): Reporting patient stating that he fell over the last week, he didn't tell when. He fell on left side no injuries and did not hit head.     Phone Number Homecare Nurse can be reached at: [phone]    Can we leave a detailed message on this number? YES    Phone number patient can be reached at: Home number on file [phone] (home)    Best Time: any    Call taken on 9/14/2021 at 2:44 PM by Karen Menjivar

## 2021-09-15 ENCOUNTER — TELEPHONE (OUTPATIENT)
Dept: FAMILY MEDICINE | Facility: CLINIC | Age: 61
End: 2021-09-15

## 2021-09-15 DIAGNOSIS — D50.0 IRON DEFICIENCY ANEMIA DUE TO CHRONIC BLOOD LOSS: ICD-10-CM

## 2021-09-15 NOTE — PROGRESS NOTES
Francidled with orthopedic surgeon Dr. Jones about Abhijeet's planned hip surgery tomorrow.  Abhijeet was scheduled to have a blood transfusion yesterday as ordered by hematology, but this was cancelled since his Hgb was okay at 8.6.  Dr. Jones is concerned about the possibility of Abhijeet needing transfusion due to post-op blood loss since his last type and screen showed a lot of antibodies.  Abhijeet has tolerated some fairly low hemoglobin levels in the past since his anemia is chronic, so I think he will be okay if there is a short delay in getting blood for possible post-op transfusion if needed.  Overall his anemia has been fairly stable, so I do not think we need to delay surgery any further given the amount of pain he is having and that the benefit of proceeding is greater than the risk.  Ortho has discussed this with anesthesia, and the plan is to proceed with surgery tomorrow as scheduled.    Chidi Valenzuela MD    NEPHROLOGY PROGRESS NOTE   Panda Ward 70 y o  female MRN: [id number]  Unit/Bed#: E4 -01 Encounter: [id number]  Reason for Consult: REI on CKD IV    ASSESSMENT/PLAN:  REI on CKD IV:  Suspected prerenal with hemodynamics contributing as well as recurrent episodes of REI  -appears to have had progression in kidney disease over the past year   -baseline creatinine low to mid 2's   Creatinine more recently 2 6-3 0   -currently monitoring off of IV fluids and outpatient diuretics remain on hold  -UA:  Glucosuria, trace lysed blood, greater than 300 protein, 0-1 WBCs  -CT scan shows unremarkable kidneys, normal size and position  -follows with Dr Ugo Durán  -S/P renal biopsy, awaiting cytology  -recommend avoiding nephrotoxins, hypotension, IV contrast      Hypertension:  Blood pressure above goal at this time, overall improved  -outpatient regimen:  Carvedilol 25 mg b i d , clonidine 0 2 mg t i d , hydralazine 100 mg t i d , torsemide 20 mg daily, Hytrin 5 mg at night, torsemide every other day, hydralazine 25 mg b i d  -currently on carvedilol 25 mg b i d , clonidine 0 3 mg t i d , hydralazine 100 mg t i d , Procardia XL 60 mg in the am and 30 mg in the evening, Hytrin 5 mg at night   -secondary workup, renal artery duplex unremarkable   -plasma catecholamines negative, Raman normal, renin normal,  plasma free metanephrines pending   -left kidney 9 3 cm, right kidney 10 2 cm    -avoid hypotension high fluctuations in blood pressure   -recommend holding parameters antihypertensive for systolic blood pressure less than 130       History of nephrotic range proteinuria:  Suspected secondary to hypertension as well as diabetes   -most recent urine protein to creatinine ratio 6 29 g       Chronic diastolic CHF:  Examining fairly euvolemic   -outpatient diuretics currently on hold    -recommend daily weights, fluid restriction, I/O      Biclonal gammopathy:   -continue outpatient follow-up with Hematology/Oncology  -S/P bone marrow biopsy, awaiting cytology       Anemia:  -continue to monitor and transfuse as needed for hemoglobin less than 7 0   -iron panel shows low iron sat, receiving IV Venofer times total of 5 doses      Other:  Diabetes  Disposition:  requiring additional stay due to medical needs  SUBJECTIVE:  The patient is resting in her bed  She denies chest pain or shortness  She denies N,V,D, or issues with urination  She denies changes to her appetite       OBJECTIVE:  Current Weight: Weight - Scale: 73 2 kg (161 lb 4 8 oz)  Vitals:    09/15/21 0700 09/15/21 0800 09/15/21 1047 09/15/21 1100   BP: 140/66 138/58 152/67 163/70   BP Location: Right arm Right arm Right arm    Pulse:  89 90 68   Resp:  18 16    Temp:  98 1 °F (36 7 °C) 99 4 °F (37 4 °C)    TempSrc:  Temporal     SpO2:  96% 95%    Weight:       Height:         No intake or output data in the 24 hours ending 09/15/21 1158  General: NAD  Skin: warm, dry, intact, no rash  HEENT: Moist mucous membranes, sclera anicteric, normocephalic, atraumatic  Neck: No apparent JVD appreciated  Chest: lung sounds clear B/L, on RA   CVS:Regular rate and rhythm, no murmer   Abdomen: Soft, round, non-tender, +BS  Extremities: No B/L LE edema present  Neuro: alert and oriented  Psych: appropriate mood and affect     Medications:    Current Facility-Administered Medications:     acetaminophen (TYLENOL) tablet 650 mg, 650 mg, Oral, Q6H PRN, Deloris Kumarrik, CRNP, 650 mg at 09/07/21 0827    atorvastatin (LIPITOR) tablet 40 mg, 40 mg, Oral, After Dinner, Veronicaiyin Yurik, CRNP, 40 mg at 09/14/21 1708    carvedilol (COREG) tablet 25 mg, 25 mg, Oral, BID With Meals, Cuiyin Yurik, CRNP, 25 mg at 09/15/21 0709    cloNIDine (CATAPRES) tablet 0 3 mg, 0 3 mg, Oral, TID, Deloris Kumarrik, CRNP, 0 3 mg at 09/15/21 1124    gabapentin (NEURONTIN) capsule 300 mg, 300 mg, Oral, Daily, MARLENA Parekh, 300 mg at 09/15/21 1124    hydrALAZINE (APRESOLINE) tablet 100 mg, 100 mg, Oral, Q8H [facility], Deloris Carrie, CRNP, 100 mg at 09/15/21 0709    insulin glargine (LANTUS) subcutaneous injection 12 Units 0 12 mL, 12 Units, Subcutaneous, QAM, Deloris Butler, CRNP, 12 Units at 09/15/21 1127    insulin lispro (HumaLOG) 100 units/mL subcutaneous injection 1-5 Units, 1-5 Units, Subcutaneous, HS, Deloris Butler, CRNP, 2 Units at 09/13/21 2141    insulin lispro (HumaLOG) 100 units/mL subcutaneous injection 1-6 Units, 1-6 Units, Subcutaneous, TID AC, 1 Units at 09/15/21 1127 **AND** Fingerstick Glucose (POCT), , , TID AC, Deloris Begumk, CRNP    Labetalol HCl (NORMODYNE) injection 20 mg, 20 mg, Intravenous, Q4H PRN, Deloris Begumk, CRNP, 20 mg at 09/14/21 2220    NIFEdipine (PROCARDIA XL) 24 hr tablet 30 mg, 30 mg, Oral, After Cara Uribe, DO, 30 mg at 09/14/21 1818    NIFEdipine (PROCARDIA XL) 24 hr tablet 60 mg, 60 mg, Oral, Daily, Maristerling Rowley, DO, 60 mg at 09/15/21 1124    ondansetron (ZOFRAN) injection 4 mg, 4 mg, Intravenous, Q6H PRN, Deloris Butler, CRNP, 4 mg at 09/07/21 0823    terazosin (HYTRIN) capsule 5 mg, 5 mg, Oral, HS, Deloris Kumarrik, CRNP, 5 mg at 09/14/21 2155    Laboratory Results:  Results from last 7 days   Lab Units 09/15/21  0540 09/14/21  0542 09/13/21  0537 09/12/21  0649 09/11/21  0324 09/10/21  0548 09/09/21  0457   WBC Thousand/uL 6 69 5 38 5 93 6 37  --  5 46 5 95   HEMOGLOBIN g/dL 7 9* 7 9* 8 3* 8 1*  --  8 3* 8 1*   HEMATOCRIT % 24 7* 24 9* 26 4* 26 0*  --  26 3* 26 1*   PLATELETS Thousands/uL 230 211 230 215  --  216 234   SODIUM mmol/L 137 137 138 137   < > 139 138   POTASSIUM mmol/L 5 3 5 2 5 2 5 1   < > 4 3 4 1   CHLORIDE mmol/L 104 102 102 102   < > 104 103   CO2 mmol/L 23 25 23 25   < > 27 27   BUN mg/dL 61* 63* 61* 55*   < > 43* 47*   CREATININE mg/dL 2 91* 3 20* 3 09* 2 93*   < > 2 81* 3 06*   CALCIUM mg/dL 7 9* 7 8* 8 0* 8 0*   < > 7 9* 7 3*   MAGNESIUM mg/dL  --   --  2 2 2 0  --  2 3  --    PHOSPHORUS mg/dL  --   --  4 4* 4 6*  -- --   --    ALK PHOS U/L  --   --   --  69  --   --  60   ALT U/L  --   --   --  17  --   --  13   AST U/L  --   --   --  18  --   --  12    < > = values in this interval not displayed

## 2021-09-16 RX ORDER — FERROUS SULFATE 325(65) MG
325 TABLET ORAL EVERY OTHER DAY
Qty: 45 TABLET | Refills: 3 | Status: SHIPPED | OUTPATIENT
Start: 2021-09-16

## 2021-09-16 NOTE — TELEPHONE ENCOUNTER
Note from pharmacy:  Abhijeet needs a new RX sent over for iron tablets.    [facility]

## 2021-09-17 ENCOUNTER — ALLIED HEALTH/NURSE VISIT (OUTPATIENT)
Dept: AUDIOLOGY | Facility: CLINIC | Age: 61
End: 2021-09-17
Payer: COMMERCIAL

## 2021-09-17 DIAGNOSIS — H90.3 SENSORINEURAL HEARING LOSS, ASYMMETRICAL: Primary | ICD-10-CM

## 2021-09-17 PROCEDURE — 99207 PR NO CHARGE LOS: CPT | Performed by: AUDIOLOGIST

## 2021-09-17 PROCEDURE — V5299 HEARING SERVICE: HCPCS | Performed by: AUDIOLOGIST

## 2021-09-17 NOTE — PROGRESS NOTES
[facility]           SUBJECTIVE:  Abhijeet Mccauley , 60 year old male dropped off his 2019 Phonak Bolero B50-R hearing aids and earmolds for maintenance.      OBJECTIVE:  Cleaned hearing aids and retubed earmolds. Verified hearing aid functionality.        ASSESSMENT/PLAN:    Return to clinic as needed. Called to  devices.    Destiney Robison Licensed Audiologist #[id number]

## 2021-09-28 ENCOUNTER — TELEPHONE (OUTPATIENT)
Dept: FAMILY MEDICINE | Facility: CLINIC | Age: 61
End: 2021-09-28

## 2021-09-28 DIAGNOSIS — M84.361A STRESS FRACTURE OF RIGHT TIBIA, INITIAL ENCOUNTER: ICD-10-CM

## 2021-09-28 DIAGNOSIS — K70.30 ALCOHOLIC CIRRHOSIS, UNSPECIFIED WHETHER ASCITES PRESENT (H): ICD-10-CM

## 2021-09-28 DIAGNOSIS — F10.20 ALCOHOL USE DISORDER, SEVERE, DEPENDENCE (H): ICD-10-CM

## 2021-09-28 RX ORDER — SPIRONOLACTONE 25 MG/1
TABLET ORAL
Qty: 30 TABLET | Refills: 0 | Status: SHIPPED | OUTPATIENT
Start: 2021-09-28

## 2021-09-28 RX ORDER — FOLIC ACID 1 MG/1
TABLET ORAL
Qty: 90 TABLET | Refills: 0 | Status: SHIPPED | OUTPATIENT
Start: 2021-09-28

## 2021-09-28 NOTE — LETTER
Abhijeet Mccauley  [address]   [place] [address]            Abhijeet,      We have been unable to contact you by phone.  We sent in a refill to the pharmacy for you.  Dr. Valenzuela would like you to come in for a lab appointment.  You also need to follow up with your liver specialist.      Thank you,              Dr. Nicolas M.D./Catherine WANG RN

## 2021-09-28 NOTE — TELEPHONE ENCOUNTER
"Requested Prescriptions   Pending Prescriptions Disp Refills     spironolactone (ALDACTONE) 25 MG tablet [Pharmacy Med Name: SPIRONOLACT 25MG] 30 tablet 1     Sig: TAKE 1 TABLET BY MOUTH ONCE DAILY       Diuretics (Including Combos) Protocol Failed - 9/28/2021  5:48 PM        Failed - Normal serum creatinine on file in past 12 months     Recent Labs   Lab Test 08/27/21  1502   CR 2.39*              Failed - Normal serum potassium on file in past 12 months     Recent Labs   Lab Test 08/27/21  1502   POTASSIUM 3.1*                    Passed - Blood pressure under 140/90 in past 12 months     BP Readings from Last 3 Encounters:   08/27/21 124/78   05/26/21 (!) 160/75   05/23/21 123/63                 Passed - Recent (12 mo) or future (30 days) visit within the authorizing provider's specialty     Patient has had an office visit with the authorizing provider or a provider within the authorizing providers department within the previous 12 mos or has a future within next 30 days. See \"Patient Info\" tab in inbasket, or \"Choose Columns\" in Meds & Orders section of the refill encounter.              Passed - Medication is active on med list        Passed - Patient is age 18 or older        Passed - Normal serum sodium on file in past 12 months     Recent Labs   Lab Test 08/27/21  1502                      "

## 2021-09-29 ENCOUNTER — TELEPHONE (OUTPATIENT)
Dept: FAMILY MEDICINE | Facility: CLINIC | Age: 61
End: 2021-09-29

## 2021-09-29 NOTE — TELEPHONE ENCOUNTER
Left non-detailed message for patient to return a call to the clinic RN.  Needs appt with liver specialist and below per Dr Valenzuela.  JESU Gramajo, S[facility]        Additionally,   As noted in telephone refill request encounter yesterday, 1.  he needs to come in for lab recheck to make sure he doesn't seem too dehydrated.    2.  Probably would be good to do a vital sign check with RN at the same time.    3.  He is following with ortho for his joint pain- looks like they prescribed him some tramadol on 8/31 which he filled on 9/10.    Recommend ortho follow-up if he is continuing to have joint pain.     Thanks,  Chidi Valenzuela MD

## 2021-09-29 NOTE — TELEPHONE ENCOUNTER
As noted in telephone refill request encounter yesterday, he needs to come in for lab recheck to make sure he doesn't seem too dehydrated.  Probably would be good to do a vital sign check with RN at the same time.  He is following with ortho for his joint pain- looks like they prescribed him some tramadol on 8/31 which he filled on 9/10.  Recommend ortho follow-up if he is continuing to have joint pain.    Thanks,  Chidi Valenzuela MD

## 2021-09-29 NOTE — TELEPHONE ENCOUNTER
Left non-detailed message for Jenise with home care to return a call to the clinic RN.     JESU Gramajo RN

## 2021-09-29 NOTE — TELEPHONE ENCOUNTER
Reason for Call:  Other FYI-Home Care    Detailed comments: Patient weighed last week at 180, this week he is 166.RN will weigh him again next week. Not sure if this is accurate, tho.  Yesterday, patient was taking 3 Extra Strength Tylenol prn, told patient he can only take 2 tabs 3x/day, maximum 6 per day. Home Care RN told patient if he has uncontrolled pain to contact Dr. Valenzuela.    Phone Number Patient can be reached at: Other phone number:  Jenise [place] [employer] [facility] [employer] [phone]    Best Time: Any    Can we leave a detailed message on this number? YES    Call taken on 9/29/2021 at 9:53 AM by Lia Montalvo

## 2021-09-30 NOTE — TELEPHONE ENCOUNTER
Jenise with Home care was notified and will contact the patient to inform him.  She will have the patient call back to schedule labs and bp check or she will do this at the next visit.    Thank you    Yessica FISHER RN

## 2021-10-05 NOTE — ADDENDUM NOTE
Addendum  created 07/01/19 1332 by Karly Montano APRN CRNA    Intraprocedure Staff edited      
<-- Click to add NO significant Past Surgical History

## 2021-10-05 NOTE — TELEPHONE ENCOUNTER
2nd attempt.  Patient was instructed to return call to [facility] main line at [phone] to speak with an RN.  CSS- Okay to relay message below and assist with scheduling appointments for lab, RN BP check.     Linda Dockery RN  [facility]

## 2021-10-13 DIAGNOSIS — N40.1 BENIGN PROSTATIC HYPERPLASIA WITH URINARY FREQUENCY: ICD-10-CM

## 2021-10-13 DIAGNOSIS — R35.0 BENIGN PROSTATIC HYPERPLASIA WITH URINARY FREQUENCY: ICD-10-CM

## 2021-10-14 RX ORDER — FINASTERIDE 5 MG/1
5 TABLET, FILM COATED ORAL DAILY
Qty: 90 TABLET | Refills: 3 | Status: SHIPPED | OUTPATIENT
Start: 2021-10-14

## 2021-10-19 DIAGNOSIS — J45.20 MILD INTERMITTENT ASTHMA WITHOUT COMPLICATION: ICD-10-CM

## 2021-10-19 DIAGNOSIS — M87.9 BILATERAL HIP OSTEONECROSIS (H): ICD-10-CM

## 2021-10-19 RX ORDER — ALBUTEROL SULFATE 90 UG/1
2 AEROSOL, METERED RESPIRATORY (INHALATION) EVERY 4 HOURS PRN
Qty: 18 G | Refills: 11 | Status: CANCELLED | OUTPATIENT
Start: 2021-10-19

## 2021-10-20 RX ORDER — TRAMADOL HYDROCHLORIDE 50 MG/1
TABLET ORAL
Qty: 30 TABLET | Refills: 0 | OUTPATIENT
Start: 2021-10-20

## 2021-10-20 NOTE — TELEPHONE ENCOUNTER
Tramadol has recently been prescribed by ortho, Dr. Jones, per [place]  report.  Advise he follow-up with Dr. Jones regarding refill.  If Dr. Jones is no longer refilling med, he should schedule a follow-up visit.    Thanks,  Chidi Valenzuela MD

## 2021-10-21 ENCOUNTER — TELEPHONE (OUTPATIENT)
Dept: FAMILY MEDICINE | Facility: CLINIC | Age: 61
End: 2021-10-21

## 2021-10-21 ENCOUNTER — LAB (OUTPATIENT)
Dept: LAB | Facility: CLINIC | Age: 61
End: 2021-10-21
Payer: COMMERCIAL

## 2021-10-21 ENCOUNTER — HOSPITAL ENCOUNTER (OUTPATIENT)
Dept: MRI IMAGING | Facility: CLINIC | Age: 61
Discharge: HOME | End: 2021-10-21
Attending: INTERNAL MEDICINE | Admitting: INTERNAL MEDICINE
Payer: COMMERCIAL

## 2021-10-21 DIAGNOSIS — J45.20 MILD INTERMITTENT ASTHMA WITHOUT COMPLICATION: ICD-10-CM

## 2021-10-21 DIAGNOSIS — K62.5 BRBPR (BRIGHT RED BLOOD PER RECTUM): ICD-10-CM

## 2021-10-21 DIAGNOSIS — K70.30 ALCOHOLIC CIRRHOSIS, UNSPECIFIED WHETHER ASCITES PRESENT (H): ICD-10-CM

## 2021-10-21 DIAGNOSIS — M54.10 RADICULAR PAIN OF LEFT LOWER EXTREMITY: ICD-10-CM

## 2021-10-21 LAB
ALBUMIN SERPL-MCNC: 3.2 G/DL (ref 3.4–5)
ALP SERPL-CCNC: 869 U/L (ref 40–150)
ALT SERPL W P-5'-P-CCNC: 45 U/L (ref 0–70)
ANION GAP SERPL CALCULATED.3IONS-SCNC: 10 MMOL/L (ref 3–14)
AST SERPL W P-5'-P-CCNC: 158 U/L (ref 0–45)
BILIRUB SERPL-MCNC: 3.3 MG/DL (ref 0.2–1.3)
BUN SERPL-MCNC: 16 MG/DL (ref 7–30)
CALCIUM SERPL-MCNC: 9.1 MG/DL (ref 8.5–10.1)
CHLORIDE BLD-SCNC: 109 MMOL/L (ref 94–109)
CO2 SERPL-SCNC: 22 MMOL/L (ref 20–32)
CREAT SERPL-MCNC: 1.95 MG/DL (ref 0.66–1.25)
GFR SERPL CREATININE-BSD FRML MDRD: 36 ML/MIN/1.73M2
GLUCOSE BLD-MCNC: 102 MG/DL (ref 70–99)
HGB BLD-MCNC: 12.1 G/DL (ref 13.3–17.7)
POTASSIUM BLD-SCNC: 3.5 MMOL/L (ref 3.4–5.3)
PROT SERPL-MCNC: 7.3 G/DL (ref 6.8–8.8)
SODIUM SERPL-SCNC: 141 MMOL/L (ref 133–144)

## 2021-10-21 PROCEDURE — 80053 COMPREHEN METABOLIC PANEL: CPT

## 2021-10-21 PROCEDURE — 36415 COLL VENOUS BLD VENIPUNCTURE: CPT

## 2021-10-21 PROCEDURE — 85018 HEMOGLOBIN: CPT

## 2021-10-21 PROCEDURE — 72148 MRI LUMBAR SPINE W/O DYE: CPT

## 2021-10-22 DIAGNOSIS — K70.30 ALCOHOLIC CIRRHOSIS, UNSPECIFIED WHETHER ASCITES PRESENT (H): Primary | ICD-10-CM

## 2021-11-04 ENCOUNTER — TELEPHONE (OUTPATIENT)
Dept: FAMILY MEDICINE | Facility: CLINIC | Age: 61
End: 2021-11-04

## 2021-11-04 NOTE — TELEPHONE ENCOUNTER
Patient looking for his MRI results  No one has called him back    [phone]  Ok to leave message

## 2021-11-05 NOTE — TELEPHONE ENCOUNTER
Dr Valenzuela,     Please see message below; advise. Any further recommendations r/t MRI results?    Elyssa Mesa RN  [facility]

## 2021-11-05 NOTE — TELEPHONE ENCOUNTER
Covering for primary/ordering provider:  It appears this was ordered by Dr. Jones with [facility]. MRI does show degenerative changes throughout the lumbar spine which have been present since his last MRI. I cannot see Dr. Jones's last OV note, though, so I would recommend he contact his office for next steps.    Deirdre Keys, CNP

## 2021-11-09 DIAGNOSIS — G47.00 INSOMNIA, UNSPECIFIED TYPE: ICD-10-CM

## 2021-11-10 RX ORDER — TRAZODONE HYDROCHLORIDE 50 MG/1
TABLET, FILM COATED ORAL
Qty: 30 TABLET | Refills: 2 | Status: SHIPPED | OUTPATIENT
Start: 2021-11-10

## 2021-11-12 ENCOUNTER — TRANSFERRED RECORDS (OUTPATIENT)
Dept: HEALTH INFORMATION MANAGEMENT | Facility: CLINIC | Age: 61
End: 2021-11-12
Payer: COMMERCIAL

## 2021-11-12 LAB
ALT SERPL-CCNC: 46 U/L (ref 8–45)
AST SERPL-CCNC: 103 U/L (ref 2–40)
CREATININE (EXTERNAL): 2.35 MG/DL (ref 0.72–1.25)
GFR ESTIMATED (EXTERNAL): 28 ML/MIN/1.73M2
GFR ESTIMATED (IF AFRICAN AMERICAN) (EXTERNAL): 34 ML/MIN/1.73M2
GLUCOSE (EXTERNAL): 106 MG/DL (ref 65–100)
POTASSIUM (EXTERNAL): 3.8 MMOL/L (ref 3.5–5)

## 2021-11-13 ENCOUNTER — TRANSFERRED RECORDS (OUTPATIENT)
Dept: HEALTH INFORMATION MANAGEMENT | Facility: CLINIC | Age: 61
End: 2021-11-13
Payer: COMMERCIAL

## 2021-12-05 ENCOUNTER — MEDICAL CORRESPONDENCE (OUTPATIENT)
Dept: HEALTH INFORMATION MANAGEMENT | Facility: CLINIC | Age: 61
End: 2021-12-05
Payer: COMMERCIAL

## 2021-12-28 NOTE — PLAN OF CARE
Discharge Planner PT   Patient plan for discharge: not discussed   Current status: Pt on 3L NC, mouth breathing, but SPO2 95%+, RR into 30s with activity. Max-A of 2 supine>sit. Max-A sit<>stand, unable to fully rise, unclear if pt initiating any attempt. Max-A of 2 stand/pivot to bedside chair.  Barriers to return to prior living situation: medical status, cognitive impairment, current mobility  Recommendations for discharge: TCU  Rationale for recommendations: pt will benefit from continued rehab in order to maximize IND with all ADLs and mobility.        Entered by: Ravin Stevens 06/18/2020 4:20 PM        Titi Chan

## 2022-04-19 NOTE — PROGRESS NOTES
[facility]         SUBJECTIVE:  Abhijeet Mccauley , 58 year old male comes in for in office repair of his 2019 Phonak Bolero B50-ND hearing aids with custom earmolds. He reports the hearing aids have both stopped working.     OBJECTIVE:  Replaced plugged earmold tubing bilaterally. Reviewed hearing aid and earmold care and cleaning. Verified hearing aid functionality.      ASSESSMENT/PLAN:   Return to clinic as needed. See chart in the hearing aid room.     Ai SELF-HEATH, #[id number]      Attending and PA/NP shared services statement (NON-critical care):

## 2022-05-14 NOTE — TELEPHONE ENCOUNTER
Dr. Valenzuela,    No one really has openings.  Are you willing to do his H and P on 11/24/20 at 2 or 4 in your same day spots?  Patient states he needs a 2 day notice to arrange a ride. Please advise. Catherine WANG RN    
I suppose that'll have to work.    Thanks,  Chidi Valenzuela MD   
Patient is called and appt is scheduled. Catherine WANG RN    
Provider as patient to be called, he missed pre-op appointment and needs a call to reschedule pre-op.  CMA tried calling patient at 1:30, he said he was sleeping told this writer to call later to schedule.  Please call and schedule pre-op with any provider as surgery is 11/25/20. April MONDRAGON CMA ([facility])      
No

## 2022-08-16 NOTE — ED NOTES
..DATE:  8/13/2020   TIME OF RECEIPT FROM LAB:  6209  LAB TEST:  Lactic  LAB VALUE:  2.6  RESULTS GIVEN WITH READ-BACK TO (PROVIDER):  Tolu Josue MD  TIME LAB VALUE REPORTED TO PROVIDER:   5840   1.62

## 2022-12-20 NOTE — TELEPHONE ENCOUNTER
Addended by: CARLOS WHALEY on: 12/20/2022 02:11 PM     Modules accepted: Orders     Message left for patient to return call to clinic.  CSS - ok to deliver message below.    Fatou ESTRADA RN BSN

## 2023-06-08 NOTE — NURSING NOTE
"Oncology Rooming Note    July 19, 2018 1:24 PM   Abhijeet Mccauley is a 57 year old male who presents for:    Chief Complaint   Patient presents with     Oncology Clinic Visit     1 month recheck Normocytic anemia. review BMBX      Initial Vitals: /64 (BP Location: Right arm, Patient Position: Sitting, Cuff Size: Adult Large)  Pulse 80  Temp 98.8  F (37.1  C) (Tympanic)  Resp 18  Ht 1.651 m (5' 5\")  Wt 83.3 kg (183 lb 11.2 oz)  SpO2 97%  BMI 30.57 kg/m2 Estimated body mass index is 30.57 kg/(m^2) as calculated from the following:    Height as of this encounter: 1.651 m (5' 5\").    Weight as of this encounter: 83.3 kg (183 lb 11.2 oz). Body surface area is 1.95 meters squared.  Worst Pain (10) Comment: Data Unavailable   No LMP for male patient.  Allergies reviewed: Yes  Medications reviewed: Yes    Medications: Medication refills not needed today.  Pharmacy name entered into [facility]:    [facility] - [place] - [address] [facility] [place], [place] - [address]    Clinical concerns: 1 month recheck Normocytic anemia. review BMBX.      8 minutes for nursing intake (face to face time)     Ernestina Burks, HUE            " No

## 2023-10-27 NOTE — TELEPHONE ENCOUNTER
Reason for call:  Patient reporting a symptom    Symptom or request: Jenise from [facility] calling with following requests:    1.  Pt is refusing Melatonin and he wants an Rx for Trazodone -  [facility]  2.  Need dose confirmation on Spironolactone  3.  Refill on Aspirin - [facility]    Duration (how long have symptoms been present): ongoing    Have you been treated for this before? Yes    Additional comments:     Phone Number patient can be reached at:  Other phone number:  [phone]    Best Time:  any    Can we leave a detailed message on this number:  YES    Call taken on 10/27/2020 at 2:59 PM by Nenita Maria     2023

## 2024-01-02 NOTE — PROGRESS NOTES
[facility]    Hospitalist Progress Note    Date of Service (when I saw the patient): 08/26/2020    Assessment & Plan   Abhijeet Mccauley is a 59 year old male with past medical history significant for severer alcohol use disorder, alcoholic cirrhosis, and COPD who presents with falls and altered mental status.     Interval History  Pt did well overnight and did not threaten to leave the hospital. He is doing ok this morning. I observed him doing cognitive testing with OT. He is aware of his memory deficits. He is still upset about being in the hospital, but as of now is agreeing to stay until we are able to find a TCU. He said he didn't sleep well at all but has no other complaints.     Falls  Encephalopathy, likely metabolic and alcohol withdrawal and possible hepatic  Hospitalized 7/2020 for UTI. With increasing confusion and falls. Recent alcohol treatment 7/4-8/5, began drinking immediately upon discharge. Confused on presentation. With at least 2 falls. 4-5 beers (at least) daily.  On presentation confusion and lethargy. Lactic acid elevated at 2.6. ammonia 54. WBC 13. UA with large blood and large leuest. Etoh 0.03 on admission. CT abdomen with cirrhosis with varices, small volume ascites, R 7th and 10th rib fractures. CT head/ c-spine negative for acute fracture. he is afebrile and with no abdominal pain, making SBP unlikely. Acute encephalopathy has improved, now we are seeing underlying cognitive impairment. No further medical work-up indicated.   -8/13 blood cultures no growth  -urine culture negative  -ceftriaxone 1g IV q24 hours started 8/13, with a negative urine and little evidence of SBP antibiotics were stopped on 8/18.  Lactate levels are baseline  -HE management as below    Cognitive Impairment   Possible on-going Delirium   Pt's acute encephalopathy and underlying medical problems improved but still a bit confused and impulsive. I suspect there is underlying cognitive impairment  likely from long-term alcohol abuse. I am not sure how much more improvement we will see. In June of 2020, he scored 11/22 on a MOCA, and this hospitalization his SLUMS was 9/30.On 8/23 assessed by psychiatry and found to be non-decisional. Pt threatened to leave the hospital on 8/24, he was briefly placed on a 72 hour hold, which was discontinued on 8/25 by psychiatry after the patient agreed to stay voluntarily. SLUMS did improve to 14/30 on 8/26.  - increase seroquel 25 mg BID, + 50mg at bedtime   - Therapies (PT/SLP) all recommending TCU   - Continue 1:1  - Encouraged the patient to cooperate with transitioning to a TCU once medically stable. CD treatment would be more ideal afterwards unless he improves significantly prior to discharge.    Alcoholic cirrhosis  Concern for hepatic encephalopathy  PTA furosemide 20 mg daily, lactulose 20 gm TID, rifaximin 550 BID, spironolactone 25 mg daily  Longstanding history of alcohol use disorder with resultant cirrhosis. LFT's ok except alk phos (see below). No abdominal pain. Ammonia on admission at 54, improved 8/14 to 29. Total bili stabilized.   - lactulose 20 gm BID   - rifaximin 550 mg BID   -Prior to admission diuretics are on hold, he received a dose on 8/18 of Lasix, with good effect. Prior Cr 1.9-2 in 3/2019, actually better this admission and wonder if over diuresing prior.    Acute on chronic Normocytic Anemia  Known history of varices with banding. Reported hemoccult positive. Hemoglobin on admission was 6.6.  INR on admission is 1.45.    - transfused x 1 unit in ED, appreciate gastroenterology input, continue Protonix 40 mg IV twice daily   - pantoprazole 40 mg BID  -Hemoglobin is stable at ~9.3 8/26    Alcohol use disorder  H/o Etoh withdrawal seizures  Longstanding h/o Etoh use/ abuse. Multiple recent admissions for Etoh related issues. H/o Etoh w/d seizures.   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 Lino  Baseline creatinine appears to be in the 1.4-1.9 range as of recent, 1.68 on admission. Patient with CT on admission showing ureteric stent present.  - avoid nephrotoxins  - creatinine stable    Hypernatremia  Hypokalemia  Improved/ resolved    Respiratory distress 8/16 - resolved  Type II NSTEMI  Early am 8/16 with respiratory distress. Audible expiratory wheezes appreciated. Otherwise lung sounds clear. O2 sats 97% on 2L. Vitals otherwise stable. CXR clear, BNP elevated 5477. Troponin detectable at 0.070. VBG 7.35/36/30  -Patient did receive a dose of IV Lasix with significant diuresis, currently creatinine is stable  - repeat troponin am 8/18 0.027  - recent echo 6/2020 with EF 55-60, mild concentric LV hypertrophy.   - suspect underlying COPD, less likely acute HFpEF.  Chest x-ray was clear    Hypoglycemia  Resolved as PO intake improved    Elevated alk phos  Seen by GI inpatient 6/23. Suspected Alk phos elevation multifactorial, related to cirrhosis, recent fractures. At that time further workup  (EUS with liver biopsy) unlikely to  with ongoing active Etoh history and lack of follow up  - monitor for now    Right 10th posterior rib fracture  Patient with CT on admission showing comminuted segmental fracture of right seventh rib with bilateral rib fractures otherwise stable, as well as right posterior rib fracture with increased displacement.  -Monitor  -prn acetaminophen    History of AML  Diagnosed 2008, s/p chemotherapy, complete remission >10  years  -Monitor    COPD  - Continue PTA inhaler Advair (Breo)  500-50 1 puff daily   - prn duonebs available    MAGALIE  Hold medications    FEN (fluids, electrolytes and nutrition): mechanical soft diet with thin liquids  Discussed with nursing.  DVT Prophylaxis: Pneumatic Compression Devices  Code Status: Full Code    Disposition: Expected discharge in 1-2 days pending psychiatry evaluation/placement     Yoselin Soto MD        -Data reviewed today: I reviewed all new labs and imaging results over the last 24 hours. I personally reviewed no images or EKG's today.    Physical Exam   Temp: 97.4  F (36.3  C) Temp src: Oral BP: (!) 147/68 Pulse: 76   Resp: 16 SpO2: 97 % O2 Device: None (Room air)    Vitals:    08/23/20 0526 08/24/20 0354 08/25/20 0615   Weight: 72.1 kg (158 lb 15.2 oz) 72.2 kg (159 lb 3.2 oz) 72.2 kg (159 lb 1.6 oz)     Vital Signs with Ranges  Temp:  [97.4  F (36.3  C)-97.8  F (36.6  C)] 97.4  F (36.3  C)  Pulse:  [76-84] 76  Resp:  [16-18] 16  BP: (134-147)/(64-76) 147/68  SpO2:  [97 %-98 %] 97 %  I/O last 3 completed shifts:  In: 580 [P.O.:580]  Out: -     Constitutional: Awake, alert, NAD, short term memory impairment   Respiratory: no increased work-of breathing   GI: non-distended  Skin/Integumen: No rashes, no cyanosis, no LE edema  Neuro : moving all 4 extremities, no focal deficits noted, but does have marked cognitive impairment based on SLUMS  Psych: Pleasant      Medications       fluticasone-vilanterol  1 puff Inhalation Daily     folic acid  1 mg Oral Daily     lactulose  20 g Oral BID     miconazole   Topical BID     multivitamin w/minerals  1 tablet Oral Daily     pantoprazole  40 mg Oral BID AC     QUEtiapine  25 mg Oral BID     QUEtiapine  50 mg Oral At Bedtime     rifaximin  550 mg Oral BID     sodium chloride (PF)  3 mL Intracatheter Q8H       Data   Recent Labs   Lab 08/26/20  0758 08/23/20  0800 08/22/20  0836  08/20/20  0935   WBC 8.5  --   --   --  8.1   HGB 9.3* 9.8*  --    --  9.8*   MCV 88  --   --   --  89     --   --   --  145*    140 140   < > 141   POTASSIUM 3.7 3.8 3.9   < > 3.8   CHLORIDE 111* 114* 112*   < > 115*   CO2 19* 21 22   < > 19*   BUN 13 13 11   < > 9   CR 1.55* 1.49* 1.70*   < > 1.45*   ANIONGAP 7 5 6   < > 7   BEE 8.6 8.9 8.9   < > 8.6   * 90 90   < > 84   ALBUMIN  --   --   --   --  2.8*   PROTTOTAL  --   --   --   --  6.3*   BILITOTAL  --   --   --   --  1.9*   ALKPHOS  --   --   --   --  395*   ALT  --   --   --   --  21   AST  --   --   --   --  27    < > = values in this interval not displayed.       No results found for this or any previous visit (from the past 24 hour(s)).     Diet, Consistent Carbohydrate w/Evening Snack:   Low Fat (LOWFAT)  Supplement Feeding Modality:  Oral  Glucerna Shake Cans or Servings Per Day:  1       Frequency:  Daily (01-02-24 @ 11:36) [Pending Verification By Attending]  Diet, NPO after Midnight:      NPO Start Date: 01-Jan-2024,   NPO Start Time: 23:59 (01-01-24 @ 18:07) [Active]  Diet, Regular (12-31-23 @ 10:52) [Active]

## 2024-02-14 NOTE — TELEPHONE ENCOUNTER
"S-(situation): Pt calling with pain.    B-(background): Seen in ED 2/25/19 after sustaining a fall.     A-(assessment):   \"I can hardly make it to the phone that's how bad it is.\"  \"All I have is Tylenol, there's no way that's even working.\"  \"The other pills gave me the itchies.\" (the Percocet).  Rates pain a \"major 10.\"  The majority of the pain is in his back and his legs.  \"My legs can hardly function.\"  Taking one Tylenol every three hours. Discussed this is not how to be taking it, instructions were given how to take this at pain clinic appt. Of note, pt also has alcoholic cirrhosis of the liver.     Reviewed with pt the following instructions from 2/13/19 OV with Dr. Mary Anne Quiñones:  1. Continue Ibuprofen 400 mg Q6H prn pain (says he is doing this)  2. Continue Tylenol 1000 mg TID (taking one pill every three hours)  3. Continue Trazodone 50 mg at bedtime (says he takes and sleeps 4 hours)  4. Trial of Keppra 250 mg BID for nerve pain (\"That don't help, not for this kind of pain.\")    Notes from 2/25/19 ED visit:  Imaging results are reviewed as shown above, with no evidence of acute fracture, or other acute posttraumatic injury which is identified.  Patient was given 2 tablets of Percocet in the emergency department, and will be sent home with a total tablets.  Follow-up has been recommended with primary care provider.  He also has pain clinic visit later this week.    R-(recommendations): Recommended that if he is as much pain as he says, he needs to go to the ED: \"They're not gonna do nothing, that's the thing. They're gonna do the same thing they did before, nothing.\"  Pt does not drive, he needs a \"two day notice\" to schedule an appt with Dr. Valenzuela. He says he will call on Monday.   He does not have a future appt with Dr. Mary Anne Quiñones scheduled yet; he was seen 2/13/19 and instructed to RTC in three months.   Informed pt he needs to follow the instructions given to him to manage his pain. Also recommended he " Bolus at least twice a day  Keep pump and CGM on at all times    Insulin Instructions  Pump Settings   HumaLOG U-100 Insulin 100 unit/mL Jay   Last edited by Donita Wooten, NP on 6/22/2023 at 10:59 AM      Basal Rate   Total Basal Dose: 36 units/day   Time units/hr   12:00 AM 1.5      Blood Glucose Target   Time mg/dL   12:00  - 150    6:00  - 120   10:00  - 150      Sensitivity Factor   Time mg/dL/unit   12:00 AM 20      Carb Ratio   Time g/unit   12:00 AM 6       try a heating pad; he does not have one to use.     Routing to Dr. Valenzuela and Dr. Mary Anne Quiñones as FYI.    Angelina HDZ RN

## 2024-05-29 NOTE — TELEPHONE ENCOUNTER
Call placed to patient.  Voicemail message left requesting call back clinic RN to review results and recommendations per Dr Eden. Call back number given.  Janneth Murry RN      Roomed by Salma GONZALEZ   Name and  verified  Last visit 2023     In room with Mom , Safia   HPI: Susy is a 17 year old female here for skin check and acne follow up.  Wart noticed on the right outer thigh  present since last fall .Patient is currently not using rx'd medications. No mole concerns today, denies new or changing moles. No other concerns today.   PMHX/FHX: Hx of dysplastic nevi. No FHX skin cancer.   Soc HX: sophomore in college   Social History     Socioeconomic History    Marital status: Single     Spouse name: Not on file    Number of children: Not on file    Years of education: Not on file    Highest education level: Not on file   Occupational History    Not on file   Tobacco Use    Smoking status: Never Smoker    Smokeless tobacco: Never Used   Substance and Sexual Activity    Alcohol use: Never    Drug use: Never    Sexual activity: Never   Other Topics Concern    Not on file   Social History Narrative    Not on file     Social Determinants of Health     Financial Resource Strain: Not on file   Food Insecurity: Not on file   Transportation Needs: Not on file   Physical Activity: Not on file   Stress: Not on file   Social Connections: Not on file   Intimate Partner Violence: Not on file     Meds:  Current Outpatient Medications   Medication Sig Dispense Refill    adapalene-benzoyl peroxide (EPIDUO) 0.1-2.5 % gel APPLY TOPICALLY TO THE AFFECTED AREA TWICE DAILY FOR ACNE 45 g 3    clindamycin (CLEOCIN T) 1 % pledgets Apply topically 2 times daily. 60 each 3    triamcinolone (KENALOG) 0.1 % dental paste ANGEL AA QID AFTER MEALS AND HS  FOR 10  DAYS      amoxicillin (AMOXIL) 500 MG capsule TAKE 1 CAPSULE BY MOUTH TWICE DAILY 60 capsule 1    Tazarotene (Arazlo) 0.045 % Lotion Apply to acne nightly as tolerated 45 g 2    lansoprazole (PREVACID) 30 MG capsule Take 30 mg by mouth daily.      clobetasol emollient base 0.05 % cream Apply topically 2 times daily. Apply to aa on the toe bid for two weeks out  of four as needed 15 g 0    doxycycline hyclate (VIBRAMYCIN) 100 MG capsule Take 1 capsule by mouth 2 times daily. 60 capsule 1    mometasone (NASONEX) 50 MCG/ACT nasal spray Use 2 Sprays in each nostril daily. - Each Nare       No current facility-administered medications for this visit.     All:  ALLERGIES:  No Known Allergies     ROS: feels well, no fevers, chills, nausea, vomiting, abdominal pain, chest pain, sob, headache, and no joint pain. Reviewed 5/11/2022     Patient denies any surgical procedures performed within the last 5 years reviewed 5/11/2022    PE:  General: AOx3 wdwn in NAD, normal mood and affect  Examined skin of head, neck, chest, abd, back, arms, legs, hands ,feet, groin and buttocks  Right scalp with well healed biopsy scar no e/o repigmenttion  Right upper back with well healed biopsy scar no e/o repigmentation  Areas of previous removals well healed  Acne on the face with very few inflammatory papules, no pustules,and comedones present to perioral area, chin, jawline, cheeks, nose   Few comedones and inflammatory papules to forehead  Minimal acne to chest, shoulders or back  Right lateral thigh with skin colored verrucous papule  Eyes: no injection    A/P  Verruca vulgaris, R leg  -Procedure Note  Cryodestruction  informed consent obtained from Susy, discussed risks of pain, blistering, dyschromia, scarring, reoccurrence, nonresolution, and this may not clear up the lesions and additional treatment may be necessary.   LN2, open spray, x two 30 second freeze-thaw cycles to 1 lesion  After care instructions discussed and provided.    Multiple nevi  -observe for changes  -ABCDEs (asymmetry, border, color, diameter, evolving) of melanoma discussed, skin cancer signs reviewed, regular monthly self skin exams recommended, sunprotection advised, annual full body exams by a physician recommended.     Acne  -Use non-comedogenic, oil-free skin care products only, recommend cetaphil antibacterial  

## 2024-09-10 NOTE — PROGRESS NOTES
SURGICAL ICU PROGRESS NOTE  June 18, 2020      PRIMARY TEAM: Trauma  PRIMARY PHYSICIAN: Dr. Walls     REASON FOR CRITICAL CARE ADMISSION: Intubated and hemodynamic monitoring  ADMITTING PHYSICIAN: Dr. Dodson        ASSESSMENT: Abhijeet Mccauley is a 59 year old male with hx of alcohol abuse, liver cirrhosis MELD score 16, COPD, ulcerative colitis and renal insufficiency, 2 weeks s/p left hip replacement, presents after multiple falls, sustained R rib 3-8 fx, UA was positive, intubated in [facility] for seizures.     Today's Changes:  - Restart spironolactone  - Monitor mental status  - Possibly remove rectal tube      PLAN:   Neuro/ pain/ sedation:  # Possible seizure disorder  Monitor neurological status. Notify the MD for any acute changes in exam.   CIWA protocol, 1mg Ativen given at 0600               - PRN ativan 1-2mg q4h prn   - Clonidine 0.1 mg Q8H rm   - Gabapentin 600 mg Q8H rm     Pain:   Dilaudid 0.2 - 0.4 mg Q1H PRN   Oxycodone 5 mg Q4H, 2x overnight   baclofen 5 mg TID   acetaminophen 975 MG Q8H    - Haloperidol 5 mg Q4H PRN for agitation    - nicotine patch      Home meds  Hold for now: Tramadol, Trazadone, hydroxyzine    Pulmonary care:   SpO2 96 - 98% on 3 LNC     - Breo, montelukast 10 mg at bedtime  - Albuterol 2.5 mg Q4H PRN     # Rib fractures  R rib 3-8 fx, multiple have anterior and posterior components   Encourage use of IS      Cardiovascular:    # HLD  Monitor hemodynamic status.     MAPs 93 - 115  HR 81 - 93, sinus    - Atorvastatin 20 mg daily  -  mg daily   - Furosemide 20 mg daily  - Propranolol 20 mg BID       GI care:   # Alcohol abuse, liver cirrhosis MELD score 16  NPO except meds  - TFs via NJT at 50, goal rate  - Rifaximin 550 mg BID   - Thiamine and folic acid, multivitamin     - Dulcolax supp 10 mg daily PRN, PEG daily PRN   Last BM 6/17, rectal tube in place     LFTs 6/17  Tbili 1.7 (1.7)  Alk phos 561 (353)  Ast/ALT WNL    Home meds  Spironolactone 50 mg daily held  Pamela, I need more details on what a \"certified medical: record means.   restart      Fluids/ Electrolytes/ Nutrition:   - Electrolyte replacement protocol     Continue to monitor I/Os              I/O: 2.5/ 2.2 (+300 mL)   UOP 2L   + 10L since admission     Na 145 (144)  K 4.4  P 2.0  Cr 1.42 (1.48)    - Start Kphos, stop neutraphos     Urology  Plan:  - Patient has been scheduled for office visit with Dr. Hale in ~1 month, sister aware  - Please page urology prior to discharge so we can discuss plan with patient in person  - Urology will sign off, please page on-call with questions      Endocrine:    No history of diabetes  Sliding scale for diabetes management.                - 139   NO insulin       ID/ Antibiotics:  #UTI, h/o enterobacter infections    Afebrile  WBC 14.2 (17.8)    - Cefepime (14 day course of total antibiotics, end 6/26)  - Left superior pole calectasis s/p cystoscopy with retrograde pyelogram and ureteral stent insertion 6/13  - Urology following:   - Continue catheter until afebrile/off pressors/off vent for 24 hrs   - Continue abx per primary, tailoring to cultures including intraop renal pelvis urine culture   - Urology will continue to follow, please call with questions   - Definitive stone treatment to be arranged upon resolution of acute illness    Bcx 6/12: Enterobacter cloacae  BCx 6/14 NGTD  Ucx 6/13: Enterobacter cloacae  C diff negative  Sputum Cx 6/15: NGTD     Heme:     Hgb 8.4 (8.3)  Plt 93 (89)      Prophylaxis:    Mechanical prophylaxis for DVT  Protonix 40 mg BID enteral   SubQ heparin TID          Miscellaneous:    PT, OT, social work for dispo       Lines/ tubes/ drains:  NJT, PIV x2, urinary catheter      Disposition:  Surgical ICU     Patient seen and discussed with staff.    Chente Tijerina MD    ====================================    TODAY'S SUBJECTIVE/INTERVAL HISTORY:   - Mucous plug with episode of desats, self resolved    OBJECTIVE:     Temp:  [98  F (36.7  C)-98.8  F (37.1  C)] 98.4  F (36.9  C)  Heart Rate:  [] 122  Resp:   [12-38] 38  MAP:  [70 mmHg-108 mmHg] 99 mmHg  Arterial Line BP: (100-167)/(50-75) 148/69  SpO2:  [95 %-100 %] 97 %  Ventilation Mode: CPAP/PS  (Continuous positive airway pressure with Pressure Support)  FiO2 (%): 40 %  Rate Set (breaths/minute): 18 breaths/min  Tidal Volume Set (mL): 430 mL  PEEP (cm H2O): 5 cmH2O  Pressure Support (cm H2O): 7 cmH2O  Oxygen Concentration (%): 40 %  Resp: (!) 38      I/O last 3 completed shifts:  In: 2699.5 [I.V.:629.5; NG/GT:940]  Out: 3755 [Urine:3535; Emesis/NG output:200; Stool:20]    General/Neuro: Awake and alert, answering questions appropriately  Pulm: Non-labored breathing on 2 LNC  Abd: soft; NT, ND  Extremities: +1 edema  Skin: warm and well-perfused.     LABS:   Arterial Blood Gases   Recent Labs   Lab 06/17/20  0921 06/16/20  1216 06/16/20  0845 06/15/20  0332   PH 7.44 7.39 7.38 7.37   PCO2 29* 34* 34* 28*   PO2 75* 110* 103 74*   HCO3 19* 20* 20* 16*     Complete Blood Count   Recent Labs   Lab 06/17/20  0358 06/16/20  0344 06/15/20  0335 06/14/20  1327   WBC 17.8* 19.5* 30.4* 37.4*   HGB 8.3* 7.8* 8.2* 8.6*   PLT 89* 73* 96* 114*     Basic Metabolic Panel  Recent Labs   Lab 06/17/20  0358 06/16/20  1209 06/16/20  0344 06/15/20  0335 06/14/20  1841     --  142 140 141   POTASSIUM 3.9 4.2 3.7 4.1 4.0   CHLORIDE 117*  --  117* 115* 117*   CO2 20  --  20 17* 16*   BUN 36*  --  29 27 23   CR 1.48*  --  1.50* 1.69* 1.65*   *  --  122* 181* 146*     Liver Function Tests  Recent Labs   Lab 06/17/20  0358 06/13/20  0443 06/12/20  2209 06/12/20  2110 06/12/20  1005   AST 42 30 Canceled, Test credited Canceled, Test credited 38   ALT 22 14 16 16 19   ALKPHOS 561* 353*  --  340* 464*   BILITOTAL 1.7* 1.7*  --  2.0* 1.9*   ALBUMIN 1.8* 2.3*  --  2.3* 2.9*   INR  --   --   --  1.64* 1.47*     Pancreatic Enzymes  Recent Labs   Lab 06/12/20  1005   LIPASE 192     Coagulation Profile  Recent Labs   Lab 06/12/20  2110 06/12/20  1005   INR 1.64* 1.47*   PTT 33 37          IMAGING:   Recent Results (from the past 24 hour(s))   XR Surgery [facility]ENRIET[facility] L/T 5 Min Fluoro w Stills    Narrative    XR SURGERY [facility] FLUORO LESS THAN 5 MIN W STILLS 6/13/2020 11:46 PM     COMPARISON: 6/13/2020    HISTORY: Cystoscopy    NUMBER OF IMAGES ACQUIRED: 6    VIEWS: [facility]    FLUOROSCOPY TIME: 1.75 minute(s)      Impression    IMPRESSION: Fluoroscopy provided to the urology service.    ANGEL DAVID MD   XR Chest Port 1 View    Narrative    EXAM: XR CHEST PORT 1 VW 6/14/2020 12:10 AM      HISTORY: line placement, hypoxemia..    COMPARISON: 6/12/2020.     TECHNIQUE: Frontal supine view of the chest.    FINDINGS: Right internal jugular central venous catheter tip projects  near the right brachiocephalic vein. Endotracheal tube tip projects  over the low thoracic trachea. Enteric tube proximal sidehole projects  at the gastric cardia with tip collimated beyond view inferiorly.    Cardiomediastinal silhouette is stable with prominent left pericardial  fat pad. Low lung volumes with increased vascular crowding, perihilar  attenuation, and streaky basilar/retrocardiac opacities. Ill-defined  linear opacity over the central right midlung overlying posterior  right fifth rib fracture. Multiple right-sided rib fractures again  noted.      Impression    IMPRESSION:   Right internal jugular central venous catheter tip projects over the  right brachiocephalic vein.  Increased parahilar and basilar opacities, likely edema. Consider  atelectasis.        I have personally reviewed the examination and initial interpretation  and I agree with the findings.    ANGEL DAVID MD

## 2024-11-07 NOTE — TELEPHONE ENCOUNTER
Administered covid vaccine   in left arm , patient tolerated well. VIS given , instructed to wait fifteen minutes.     One refill sent.  Please remind him to come in for lab appointment and to schedule follow-up with liver specialist.    Thanks,  Chidi Valenzuela MD

## 2024-11-25 NOTE — TELEPHONE ENCOUNTER
"Pt calling to report he thinks he might have \"a little\" blood in his urine. Pt reports his urine is \"a little pink\". Pt denies any clots, bright red blood or any additional urinary symptoms related to a UA. He reports it started a couple days ago and \"is not that bad\". Advised we will check a UA on him if he wanted to come in today but pt refused saying he needed to shower cause he has been working out in the garden and he does not have time to come today. He wants to keep his previously scheduled appt tomorrow and will have this addressed then.He just wanted to give Dr. Rowe a heads up. He will call if anything changes between now and then. Will pass this along to Dr. Rowe.   " Home

## (undated) DEVICE — CONNECTOR WATER VALVE PERFUSION PACK STR 020272801

## (undated) DEVICE — PACK CYSTO UMMC CUSTOM

## (undated) DEVICE — LASER FIBER HOLMIUM FLEXIVA 200UM M0068403910 840-391

## (undated) DEVICE — BONE CLEANING TIP INTERPULSE  0210-010-000

## (undated) DEVICE — URETEROSCOPE FLEX SLG USE 7.7FR LITHOVUE M0067913500

## (undated) DEVICE — GLOVE PROTEXIS W/NEU-THERA 6.5  2D73TE65

## (undated) DEVICE — SOL NACL 0.9% IRRIG 1000ML BOTTLE 2F7124

## (undated) DEVICE — CATH URETERAL OPEN END 5FRX70CM M0064002010

## (undated) DEVICE — BLADE SAW OSCIL/SAG STRK 11X90X1.19MM 4/2000 4111-119-090

## (undated) DEVICE — SU MONOCRYL 2-0 CT-1 36" UND Y945H

## (undated) DEVICE — Device

## (undated) DEVICE — SOL NACL 0.9% IRRIG 3000ML BAG 2B7477

## (undated) DEVICE — SPONGE RAY-TEC 4X8" 7318

## (undated) DEVICE — GLOVE PROTEXIS W/NEU-THERA 8.0  2D73TE80

## (undated) DEVICE — SYR 10ML LL W/O NDL 302995

## (undated) DEVICE — PILLOW ABDUCT HIP LG

## (undated) DEVICE — GOWN IMPERVIOUS SPECIALTY XLG/XLONG 32474

## (undated) DEVICE — ENDO SEAL BX PORT BPS-A

## (undated) DEVICE — DECANTER VIAL 2006S

## (undated) DEVICE — PAD CHUX UNDERPAD 23X24" 7136

## (undated) DEVICE — SOL WATER IRRIG 1000ML BOTTLE 07139-09

## (undated) DEVICE — DRAPE C-ARM W/STRAPS 42X72" 07-CA104

## (undated) DEVICE — GLOVE PROTEXIS W/NEU-THERA 7.5  2D73TE75

## (undated) DEVICE — TRAY BX BONE MARROW JAMSHIDI

## (undated) DEVICE — DRSG GAUZE 4X4" TRAY

## (undated) DEVICE — SUCTION IRR SYSTEM W/TIP INTERPULSE

## (undated) DEVICE — DRSG AQUACEL AG 3.5X9.75" HYDROFIBER 412011

## (undated) DEVICE — SOL NACL 0.9% IRRIG 3000ML BAG 07972-08

## (undated) DEVICE — BLADE SAW SAGITTAL STRK 18X90X1.37MM HD SYS 6 6118-137-090

## (undated) DEVICE — GLOVE PROTEXIS BLUE W/NEU-THERA 6.5  2D73EB65

## (undated) DEVICE — LINEN GOWN XLG 5407

## (undated) DEVICE — ESU PENCIL SMOKE EVAC W/ROCKER SWITCH 0703-047-000

## (undated) DEVICE — CATH ANGIO JB1 SOFT-VU 5FRX65CM MARINER 11734101

## (undated) DEVICE — LINEN TOWEL PACK X5 5464

## (undated) DEVICE — JELLY LUBRICATING SURGILUBE 2OZ TUBE

## (undated) DEVICE — DRAPE LINGEMAN PERC PROCEDURE 1-0815

## (undated) DEVICE — CONNECTOR URETERAL CATH 14FRX30CM COOK G14230

## (undated) DEVICE — PREP POVIDONE IODINE SWABS X3

## (undated) DEVICE — GOWN XLG DISP 9545

## (undated) DEVICE — BONE CEMENT KIT BOWL AND SPATULA STRK 6201-3-410

## (undated) DEVICE — ESU ELEC BLADE 2.75" COATED/INSULATED E1455

## (undated) DEVICE — SU VICRYL 1 CT-1 36" UND J947H

## (undated) DEVICE — SOL WATER IRRIG 1000ML BOTTLE 2F7114

## (undated) DEVICE — SU MONOCRYL 3-0 PS-2 18" UND Y497G

## (undated) DEVICE — SYR BULB IRRIG 50ML LATEX FREE 0035280

## (undated) DEVICE — BLADE SAW SAGITTAL STRK 21X90X1.19MM HD SYS 6 6221-119-090

## (undated) DEVICE — SHEATH URETERAL ACCESS NAVIGATOR HD 12/14FRX36CM M0062502250

## (undated) DEVICE — ESU ELEC BLADE 4" COATED

## (undated) DEVICE — GUIDEWIRE SENSOR DUAL FLEX STR 0.035"X150CM M0066703080

## (undated) DEVICE — NDL 30GA 0.5" 305106

## (undated) DEVICE — DRAPE SHEET REV FOLD 3/4 9349

## (undated) DEVICE — PREP CHLORAPREP 26ML TINTED ORANGE  260815

## (undated) DEVICE — PACK TOTAL HIP W/POUCH RIVERSIDE LATEX FREE

## (undated) DEVICE — TAPE CLOTH ADHESIVE 3" ZONAS

## (undated) DEVICE — GUIDEWIRE AMPLATZ 0.035"X145CM  SUPER STIFF 640-104

## (undated) DEVICE — SUCTION MANIFOLD DORNOCH ULTRA CART UL-CL500

## (undated) DEVICE — SHEATH URETERAL ACCESS NAVIGATOR HD 12/14FRX46CM M0062502260

## (undated) DEVICE — SU PDO 1 STRATAFIX 36X36CM CTX TAPERPOINT SXPD2B405

## (undated) DEVICE — SOL NACL 0.9% IRRIG 1000ML BOTTLE 07138-09

## (undated) DEVICE — SYR 20ML SLIP TIP

## (undated) DEVICE — DRAPE STERI U 1015

## (undated) DEVICE — PACK GOWN 3/PK DISP XL SBA32GPFCB

## (undated) DEVICE — NDL 19GA 1.5" FILTER 305200

## (undated) DEVICE — DEVICE RETRIEVER HEWSON 71111579

## (undated) DEVICE — CATH URETERAL OPEN END 05FR 70CM 020015

## (undated) DEVICE — BNDG COBAN 6"X5YDS STERILE

## (undated) DEVICE — GUIDEWIRE TERUMO .035X180 ANG GR3508

## (undated) DEVICE — STOCKING SLEEVE COMPRESSION CALF LG

## (undated) DEVICE — SU ETHIBOND 5 V-37 4X30" MB66G

## (undated) DEVICE — BASKET NITINOL TIPLESS HALO  1.5FRX120CM 554120

## (undated) DEVICE — CATH TRAY FOLEY SURESTEP 16FR W/URNE MTR STLK LATEX A303316A

## (undated) DEVICE — ADAPTER CATH CHECK-FLO 9FR FLL 050885 G15476

## (undated) DEVICE — PREP DURAPREP 26ML APL 8630

## (undated) RX ORDER — PROPOFOL 10 MG/ML
INJECTION, EMULSION INTRAVENOUS
Status: DISPENSED
Start: 2019-07-29

## (undated) RX ORDER — TRANEXAMIC ACID 10 MG/ML
INJECTION, SOLUTION INTRAVENOUS
Status: DISPENSED
Start: 2020-05-29

## (undated) RX ORDER — HYDROXYZINE HYDROCHLORIDE 50 MG/1
TABLET, FILM COATED ORAL
Status: DISPENSED
Start: 2021-04-29

## (undated) RX ORDER — LIDOCAINE HYDROCHLORIDE 10 MG/ML
INJECTION, SOLUTION EPIDURAL; INFILTRATION; INTRACAUDAL; PERINEURAL
Status: DISPENSED
Start: 2018-03-18

## (undated) RX ORDER — CEFAZOLIN SODIUM 2 G/100ML
INJECTION, SOLUTION INTRAVENOUS
Status: DISPENSED
Start: 2020-12-04

## (undated) RX ORDER — CYCLOPENTOLATE HYDROCHLORIDE 10 MG/ML
SOLUTION/ DROPS OPHTHALMIC
Status: DISPENSED
Start: 2019-07-01

## (undated) RX ORDER — FENTANYL CITRATE 50 UG/ML
INJECTION, SOLUTION INTRAMUSCULAR; INTRAVENOUS
Status: DISPENSED
Start: 2020-06-13

## (undated) RX ORDER — PHENYLEPHRINE HCL IN 0.9% NACL 1 MG/10 ML
SYRINGE (ML) INTRAVENOUS
Status: DISPENSED
Start: 2020-06-13

## (undated) RX ORDER — HYDROMORPHONE HYDROCHLORIDE 1 MG/ML
INJECTION, SOLUTION INTRAMUSCULAR; INTRAVENOUS; SUBCUTANEOUS
Status: DISPENSED
Start: 2021-04-29

## (undated) RX ORDER — GLYCOPYRROLATE 0.2 MG/ML
INJECTION, SOLUTION INTRAMUSCULAR; INTRAVENOUS
Status: DISPENSED
Start: 2018-03-18

## (undated) RX ORDER — PROPOFOL 10 MG/ML
INJECTION, EMULSION INTRAVENOUS
Status: DISPENSED
Start: 2021-04-29

## (undated) RX ORDER — PHENYLEPHRINE HYDROCHLORIDE 25 MG/ML
SOLUTION/ DROPS OPHTHALMIC
Status: DISPENSED
Start: 2019-07-01

## (undated) RX ORDER — CEFAZOLIN SODIUM 1 G/3ML
INJECTION, POWDER, FOR SOLUTION INTRAMUSCULAR; INTRAVENOUS
Status: DISPENSED
Start: 2020-06-13

## (undated) RX ORDER — LIDOCAINE HYDROCHLORIDE 10 MG/ML
INJECTION, SOLUTION EPIDURAL; INFILTRATION; INTRACAUDAL; PERINEURAL
Status: DISPENSED
Start: 2018-06-12

## (undated) RX ORDER — LIDOCAINE HYDROCHLORIDE 10 MG/ML
INJECTION, SOLUTION EPIDURAL; INFILTRATION; INTRACAUDAL; PERINEURAL
Status: DISPENSED
Start: 2020-06-25

## (undated) RX ORDER — CEFAZOLIN SODIUM 2 G/100ML
INJECTION, SOLUTION INTRAVENOUS
Status: DISPENSED
Start: 2021-04-29

## (undated) RX ORDER — TROPICAMIDE 10 MG/ML
SOLUTION/ DROPS OPHTHALMIC
Status: DISPENSED
Start: 2019-07-29

## (undated) RX ORDER — ACETAMINOPHEN 325 MG/1
TABLET ORAL
Status: DISPENSED
Start: 2021-01-13

## (undated) RX ORDER — FENTANYL CITRATE 50 UG/ML
INJECTION, SOLUTION INTRAMUSCULAR; INTRAVENOUS
Status: DISPENSED
Start: 2021-04-29

## (undated) RX ORDER — CIPROFLOXACIN 500 MG/1
TABLET, FILM COATED ORAL
Status: DISPENSED
Start: 2021-03-09

## (undated) RX ORDER — EPHEDRINE SULFATE 50 MG/ML
INJECTION, SOLUTION INTRAMUSCULAR; INTRAVENOUS; SUBCUTANEOUS
Status: DISPENSED
Start: 2021-01-13

## (undated) RX ORDER — PROPOFOL 10 MG/ML
INJECTION, EMULSION INTRAVENOUS
Status: DISPENSED
Start: 2018-03-18

## (undated) RX ORDER — CEFAZOLIN SODIUM 2 G/100ML
INJECTION, SOLUTION INTRAVENOUS
Status: DISPENSED
Start: 2021-01-13

## (undated) RX ORDER — LIDOCAINE HYDROCHLORIDE 10 MG/ML
INJECTION, SOLUTION INFILTRATION; PERINEURAL
Status: DISPENSED
Start: 2018-06-12

## (undated) RX ORDER — PROPOFOL 10 MG/ML
INJECTION, EMULSION INTRAVENOUS
Status: DISPENSED
Start: 2020-06-13

## (undated) RX ORDER — REGADENOSON 0.08 MG/ML
INJECTION, SOLUTION INTRAVENOUS
Status: DISPENSED
Start: 2021-01-11

## (undated) RX ORDER — PROPOFOL 10 MG/ML
INJECTION, EMULSION INTRAVENOUS
Status: DISPENSED
Start: 2020-05-29

## (undated) RX ORDER — FENTANYL CITRATE-0.9 % NACL/PF 10 MCG/ML
PLASTIC BAG, INJECTION (ML) INTRAVENOUS
Status: DISPENSED
Start: 2021-01-13

## (undated) RX ORDER — CYCLOPENTOLATE HYDROCHLORIDE 10 MG/ML
SOLUTION/ DROPS OPHTHALMIC
Status: DISPENSED
Start: 2019-07-29

## (undated) RX ORDER — GLYCOPYRROLATE 0.2 MG/ML
INJECTION, SOLUTION INTRAMUSCULAR; INTRAVENOUS
Status: DISPENSED
Start: 2018-06-12

## (undated) RX ORDER — LIDOCAINE HYDROCHLORIDE 10 MG/ML
INJECTION, SOLUTION EPIDURAL; INFILTRATION; INTRACAUDAL; PERINEURAL
Status: DISPENSED
Start: 2020-06-13

## (undated) RX ORDER — ONDANSETRON 2 MG/ML
INJECTION INTRAMUSCULAR; INTRAVENOUS
Status: DISPENSED
Start: 2021-01-13

## (undated) RX ORDER — LIDOCAINE HYDROCHLORIDE 10 MG/ML
INJECTION, SOLUTION EPIDURAL; INFILTRATION; INTRACAUDAL; PERINEURAL
Status: DISPENSED
Start: 2020-06-22

## (undated) RX ORDER — DEXAMETHASONE SODIUM PHOSPHATE 4 MG/ML
INJECTION, SOLUTION INTRA-ARTICULAR; INTRALESIONAL; INTRAMUSCULAR; INTRAVENOUS; SOFT TISSUE
Status: DISPENSED
Start: 2021-01-13

## (undated) RX ORDER — LIDOCAINE HYDROCHLORIDE 10 MG/ML
INJECTION, SOLUTION EPIDURAL; INFILTRATION; INTRACAUDAL; PERINEURAL
Status: DISPENSED
Start: 2021-04-29

## (undated) RX ORDER — LIDOCAINE HYDROCHLORIDE 20 MG/ML
JELLY TOPICAL
Status: DISPENSED
Start: 2021-03-09

## (undated) RX ORDER — ROPIVACAINE HYDROCHLORIDE 7.5 MG/ML
INJECTION, SOLUTION EPIDURAL; PERINEURAL
Status: DISPENSED
Start: 2021-04-29

## (undated) RX ORDER — FENTANYL CITRATE 50 UG/ML
INJECTION, SOLUTION INTRAMUSCULAR; INTRAVENOUS
Status: DISPENSED
Start: 2020-12-04

## (undated) RX ORDER — CEFAZOLIN SODIUM 2 G/100ML
INJECTION, SOLUTION INTRAVENOUS
Status: DISPENSED
Start: 2020-05-29

## (undated) RX ORDER — PROPOFOL 10 MG/ML
INJECTION, EMULSION INTRAVENOUS
Status: DISPENSED
Start: 2021-01-13

## (undated) RX ORDER — GABAPENTIN 300 MG/1
CAPSULE ORAL
Status: DISPENSED
Start: 2021-04-29

## (undated) RX ORDER — TRANEXAMIC ACID 650 MG/1
TABLET ORAL
Status: DISPENSED
Start: 2021-04-29

## (undated) RX ORDER — CELECOXIB 200 MG/1
CAPSULE ORAL
Status: DISPENSED
Start: 2020-05-29

## (undated) RX ORDER — FENTANYL CITRATE 50 UG/ML
INJECTION, SOLUTION INTRAMUSCULAR; INTRAVENOUS
Status: DISPENSED
Start: 2021-01-13

## (undated) RX ORDER — ONDANSETRON 2 MG/ML
INJECTION INTRAMUSCULAR; INTRAVENOUS
Status: DISPENSED
Start: 2021-04-29

## (undated) RX ORDER — PHENYLEPHRINE HYDROCHLORIDE 25 MG/ML
SOLUTION/ DROPS OPHTHALMIC
Status: DISPENSED
Start: 2019-07-29

## (undated) RX ORDER — FENTANYL CITRATE 50 UG/ML
INJECTION, SOLUTION INTRAMUSCULAR; INTRAVENOUS
Status: DISPENSED
Start: 2020-05-29

## (undated) RX ORDER — LIDOCAINE HYDROCHLORIDE 20 MG/ML
INJECTION, SOLUTION EPIDURAL; INFILTRATION; INTRACAUDAL; PERINEURAL
Status: DISPENSED
Start: 2021-01-13

## (undated) RX ORDER — TROPICAMIDE 10 MG/ML
SOLUTION/ DROPS OPHTHALMIC
Status: DISPENSED
Start: 2019-07-01

## (undated) RX ORDER — ESMOLOL HYDROCHLORIDE 10 MG/ML
INJECTION INTRAVENOUS
Status: DISPENSED
Start: 2021-01-13